# Patient Record
Sex: FEMALE | Race: WHITE | Employment: OTHER
[De-identification: names, ages, dates, MRNs, and addresses within clinical notes are randomized per-mention and may not be internally consistent; named-entity substitution may affect disease eponyms.]

---

## 2017-01-23 DIAGNOSIS — E13.8 OTHER SPECIFIED DIABETES MELLITUS WITH UNSPECIFIED COMPLICATIONS (HCC): ICD-10-CM

## 2017-01-23 DIAGNOSIS — I15.8 OTHER SECONDARY HYPERTENSION: ICD-10-CM

## 2017-01-23 RX ORDER — GLIPIZIDE 5 MG/1
5 TABLET ORAL
Qty: 180 TABLET | Refills: 3 | Status: SHIPPED | OUTPATIENT
Start: 2017-01-23 | End: 2017-08-24 | Stop reason: SDUPTHER

## 2017-01-23 RX ORDER — FUROSEMIDE 40 MG/1
40 TABLET ORAL 2 TIMES DAILY
Qty: 180 TABLET | Refills: 3 | Status: SHIPPED | OUTPATIENT
Start: 2017-01-23 | End: 2018-02-21 | Stop reason: SDUPTHER

## 2017-01-27 DIAGNOSIS — R21 RASH: ICD-10-CM

## 2017-01-27 RX ORDER — CLOBETASOL PROPIONATE 0.5 MG/G
CREAM TOPICAL
Qty: 60 G | Refills: 3 | Status: SHIPPED | OUTPATIENT
Start: 2017-01-27 | End: 2017-05-01 | Stop reason: SDUPTHER

## 2017-03-03 ENCOUNTER — OFFICE VISIT (OUTPATIENT)
Dept: INTERNAL MEDICINE | Facility: CLINIC | Age: 67
End: 2017-03-03

## 2017-03-03 VITALS — BODY MASS INDEX: 36.08 KG/M2 | WEIGHT: 179 LBS | HEIGHT: 59 IN

## 2017-03-03 DIAGNOSIS — N18.30 CHRONIC KIDNEY DISEASE, STAGE 3 (MODERATE): ICD-10-CM

## 2017-03-03 DIAGNOSIS — E78.00 PURE HYPERCHOLESTEROLEMIA: Primary | ICD-10-CM

## 2017-03-03 DIAGNOSIS — E11.8 TYPE 2 DIABETES MELLITUS WITH COMPLICATION, UNSPECIFIED LONG TERM INSULIN USE STATUS: ICD-10-CM

## 2017-03-03 DIAGNOSIS — Z13.9 SCREENING: ICD-10-CM

## 2017-03-03 PROCEDURE — 1090F PRES/ABSN URINE INCON ASSESS: CPT | Performed by: INTERNAL MEDICINE

## 2017-03-03 PROCEDURE — G8427 DOCREV CUR MEDS BY ELIG CLIN: HCPCS | Performed by: INTERNAL MEDICINE

## 2017-03-03 PROCEDURE — 3045F PR MOST RECENT HEMOGLOBIN A1C LEVEL 7.0-9.0%: CPT | Performed by: INTERNAL MEDICINE

## 2017-03-03 PROCEDURE — 3014F SCREEN MAMMO DOC REV: CPT | Performed by: INTERNAL MEDICINE

## 2017-03-03 PROCEDURE — 1123F ACP DISCUSS/DSCN MKR DOCD: CPT | Performed by: INTERNAL MEDICINE

## 2017-03-03 PROCEDURE — 99213 OFFICE O/P EST LOW 20 MIN: CPT | Performed by: INTERNAL MEDICINE

## 2017-03-03 PROCEDURE — G8598 ASA/ANTIPLAT THER USED: HCPCS | Performed by: INTERNAL MEDICINE

## 2017-03-03 PROCEDURE — G8417 CALC BMI ABV UP PARAM F/U: HCPCS | Performed by: INTERNAL MEDICINE

## 2017-03-03 PROCEDURE — 4040F PNEUMOC VAC/ADMIN/RCVD: CPT | Performed by: INTERNAL MEDICINE

## 2017-03-03 PROCEDURE — G8399 PT W/DXA RESULTS DOCUMENT: HCPCS | Performed by: INTERNAL MEDICINE

## 2017-03-03 PROCEDURE — 3017F COLORECTAL CA SCREEN DOC REV: CPT | Performed by: INTERNAL MEDICINE

## 2017-03-03 PROCEDURE — G8484 FLU IMMUNIZE NO ADMIN: HCPCS | Performed by: INTERNAL MEDICINE

## 2017-03-03 PROCEDURE — 1036F TOBACCO NON-USER: CPT | Performed by: INTERNAL MEDICINE

## 2017-03-03 ASSESSMENT — PATIENT HEALTH QUESTIONNAIRE - PHQ9
2. FEELING DOWN, DEPRESSED OR HOPELESS: 0
SUM OF ALL RESPONSES TO PHQ9 QUESTIONS 1 & 2: 0
1. LITTLE INTEREST OR PLEASURE IN DOING THINGS: 0
SUM OF ALL RESPONSES TO PHQ QUESTIONS 1-9: 0

## 2017-03-20 DIAGNOSIS — E13.8 OTHER SPECIFIED DIABETES MELLITUS WITH UNSPECIFIED COMPLICATIONS (HCC): ICD-10-CM

## 2017-03-24 ENCOUNTER — TELEPHONE (OUTPATIENT)
Dept: INTERNAL MEDICINE CLINIC | Age: 67
End: 2017-03-24

## 2017-03-29 ENCOUNTER — HOSPITAL ENCOUNTER (OUTPATIENT)
Age: 67
Setting detail: SPECIMEN
Discharge: HOME | End: 2017-03-29
Payer: MEDICARE

## 2017-03-29 DIAGNOSIS — Z13.9 SCREENING: ICD-10-CM

## 2017-03-29 DIAGNOSIS — E78.00 PURE HYPERCHOLESTEROLEMIA: ICD-10-CM

## 2017-03-29 DIAGNOSIS — N18.30 CHRONIC KIDNEY DISEASE, STAGE 3 (MODERATE): ICD-10-CM

## 2017-03-29 DIAGNOSIS — E11.8 TYPE 2 DIABETES MELLITUS WITH COMPLICATION, UNSPECIFIED LONG TERM INSULIN USE STATUS: ICD-10-CM

## 2017-03-29 LAB
ABSOLUTE EOS #: 0.1 K/UL (ref 0–0.4)
ABSOLUTE LYMPH #: 2 K/UL (ref 1–4.8)
ABSOLUTE MONO #: 0.5 K/UL (ref 0.1–1.2)
ALBUMIN SERPL-MCNC: 4.3 G/DL (ref 3.5–5.2)
ALBUMIN/GLOBULIN RATIO: 1.3 (ref 1–2.5)
ALP BLD-CCNC: 54 U/L (ref 35–104)
ALT SERPL-CCNC: 13 U/L (ref 5–33)
ANION GAP SERPL CALCULATED.3IONS-SCNC: 13 MMOL/L (ref 9–17)
AST SERPL-CCNC: 22 U/L
BASOPHILS # BLD: 0 % (ref 0–2)
BASOPHILS ABSOLUTE: 0 K/UL (ref 0–0.2)
BILIRUB SERPL-MCNC: 0.45 MG/DL (ref 0.3–1.2)
BUN BLDV-MCNC: 56 MG/DL (ref 8–23)
BUN/CREAT BLD: ABNORMAL (ref 9–20)
CALCIUM SERPL-MCNC: 9.7 MG/DL (ref 8.6–10.4)
CHLORIDE BLD-SCNC: 98 MMOL/L (ref 98–107)
CHOLESTEROL/HDL RATIO: 4.2
CHOLESTEROL: 181 MG/DL
CO2: 29 MMOL/L (ref 20–31)
CONTROL: PRESENT
CREAT SERPL-MCNC: 1.1 MG/DL (ref 0.5–0.9)
CREATININE URINE: 44.6 MG/DL (ref 28–217)
DIFFERENTIAL TYPE: NORMAL
EOSINOPHILS RELATIVE PERCENT: 2 % (ref 1–4)
GFR AFRICAN AMERICAN: >60 ML/MIN
GFR NON-AFRICAN AMERICAN: 50 ML/MIN
GFR SERPL CREATININE-BSD FRML MDRD: ABNORMAL ML/MIN/{1.73_M2}
GFR SERPL CREATININE-BSD FRML MDRD: ABNORMAL ML/MIN/{1.73_M2}
GLUCOSE BLD-MCNC: 87 MG/DL (ref 70–99)
HCT VFR BLD CALC: 41.4 % (ref 36–46)
HDLC SERPL-MCNC: 43 MG/DL
HEMOCCULT STL QL: NEGATIVE
HEMOGLOBIN: 13.6 G/DL (ref 12–16)
LDL CHOLESTEROL: 78 MG/DL (ref 0–130)
LYMPHOCYTES # BLD: 26 % (ref 24–44)
MCH RBC QN AUTO: 28.2 PG (ref 26–34)
MCHC RBC AUTO-ENTMCNC: 32.8 G/DL (ref 31–37)
MCV RBC AUTO: 85.8 FL (ref 80–100)
MICROALBUMIN/CREAT 24H UR: 56 MG/L
MICROALBUMIN/CREAT UR-RTO: 126 MCG/MG CREAT
MONOCYTES # BLD: 6 % (ref 2–11)
PDW BLD-RTO: 14.9 % (ref 12.5–15.4)
PLATELET # BLD: 224 K/UL (ref 140–450)
PLATELET ESTIMATE: NORMAL
PMV BLD AUTO: 8.8 FL (ref 6–12)
POTASSIUM SERPL-SCNC: 4 MMOL/L (ref 3.7–5.3)
RBC # BLD: 4.83 M/UL (ref 4–5.2)
RBC # BLD: NORMAL 10*6/UL
SEG NEUTROPHILS: 66 % (ref 36–66)
SEGMENTED NEUTROPHILS ABSOLUTE COUNT: 5.3 K/UL (ref 1.8–7.7)
SODIUM BLD-SCNC: 140 MMOL/L (ref 135–144)
TOTAL PROTEIN: 7.6 G/DL (ref 6.4–8.3)
TRIGL SERPL-MCNC: 300 MG/DL
VLDLC SERPL CALC-MCNC: ABNORMAL MG/DL (ref 1–30)
WBC # BLD: 8 K/UL (ref 3.5–11)
WBC # BLD: NORMAL 10*3/UL

## 2017-03-30 LAB
ESTIMATED AVERAGE GLUCOSE: 140 MG/DL
HBA1C MFR BLD: 6.5 % (ref 4–6)

## 2017-04-07 ENCOUNTER — TELEPHONE (OUTPATIENT)
Dept: INTERNAL MEDICINE CLINIC | Age: 67
End: 2017-04-07

## 2017-04-07 DIAGNOSIS — I10 ESSENTIAL HYPERTENSION: ICD-10-CM

## 2017-04-07 RX ORDER — METOPROLOL TARTRATE 50 MG/1
50 TABLET, FILM COATED ORAL 2 TIMES DAILY
Qty: 60 TABLET | Refills: 0 | Status: SHIPPED | OUTPATIENT
Start: 2017-04-07 | End: 2017-08-24 | Stop reason: SDUPTHER

## 2017-05-01 DIAGNOSIS — R21 RASH: ICD-10-CM

## 2017-05-02 RX ORDER — CLOBETASOL PROPIONATE 0.5 MG/G
CREAM TOPICAL
Qty: 60 G | Refills: 3 | Status: SHIPPED | OUTPATIENT
Start: 2017-05-02 | End: 2017-08-24 | Stop reason: SDUPTHER

## 2017-06-14 DIAGNOSIS — E78.5 HYPERLIPIDEMIA, UNSPECIFIED HYPERLIPIDEMIA TYPE: ICD-10-CM

## 2017-06-14 DIAGNOSIS — E78.00 HYPERCHOLESTEREMIA: ICD-10-CM

## 2017-06-16 RX ORDER — SIMVASTATIN 20 MG
20 TABLET ORAL NIGHTLY
Qty: 90 TABLET | Refills: 3 | Status: SHIPPED | OUTPATIENT
Start: 2017-06-16 | End: 2018-05-30 | Stop reason: SDUPTHER

## 2017-06-16 RX ORDER — FENOFIBRATE 200 MG/1
200 CAPSULE ORAL
Qty: 90 CAPSULE | Refills: 3 | Status: SHIPPED | OUTPATIENT
Start: 2017-06-16 | End: 2018-01-19 | Stop reason: SDUPTHER

## 2017-08-18 ENCOUNTER — TELEPHONE (OUTPATIENT)
Dept: INTERNAL MEDICINE CLINIC | Age: 67
End: 2017-08-18

## 2017-08-24 DIAGNOSIS — E13.8 OTHER SPECIFIED DIABETES MELLITUS WITH UNSPECIFIED COMPLICATIONS (HCC): ICD-10-CM

## 2017-08-24 DIAGNOSIS — Z88.9 MULTIPLE ALLERGIES: ICD-10-CM

## 2017-08-24 DIAGNOSIS — I10 ESSENTIAL HYPERTENSION: ICD-10-CM

## 2017-08-24 DIAGNOSIS — R21 RASH: ICD-10-CM

## 2017-08-24 RX ORDER — GLIPIZIDE 5 MG/1
5 TABLET ORAL
Qty: 180 TABLET | Refills: 3 | Status: SHIPPED | OUTPATIENT
Start: 2017-08-24 | End: 2018-02-21 | Stop reason: SDUPTHER

## 2017-08-24 RX ORDER — LANCETS 28 GAUGE
1 EACH MISCELLANEOUS DAILY
Qty: 300 EACH | Refills: 6 | Status: SHIPPED | OUTPATIENT
Start: 2017-08-24 | End: 2019-11-13 | Stop reason: SDUPTHER

## 2017-08-24 RX ORDER — CLOBETASOL PROPIONATE 0.5 MG/G
CREAM TOPICAL
Qty: 60 G | Refills: 3 | Status: SHIPPED | OUTPATIENT
Start: 2017-08-24 | End: 2017-12-06 | Stop reason: SDUPTHER

## 2017-08-24 RX ORDER — CLOPIDOGREL BISULFATE 75 MG/1
75 TABLET ORAL DAILY
Qty: 90 TABLET | Refills: 3 | Status: SHIPPED | OUTPATIENT
Start: 2017-08-24 | End: 2018-09-06 | Stop reason: SDUPTHER

## 2017-08-24 RX ORDER — DESLORATADINE 5 MG/1
5 TABLET ORAL DAILY
Qty: 90 TABLET | Refills: 3 | Status: SHIPPED | OUTPATIENT
Start: 2017-08-24 | End: 2018-09-07 | Stop reason: SDUPTHER

## 2017-08-24 RX ORDER — METOPROLOL TARTRATE 50 MG/1
50 TABLET, FILM COATED ORAL 2 TIMES DAILY
Qty: 180 TABLET | Refills: 3 | Status: SHIPPED | OUTPATIENT
Start: 2017-08-24 | End: 2017-10-04 | Stop reason: SDUPTHER

## 2017-09-11 ENCOUNTER — OFFICE VISIT (OUTPATIENT)
Dept: INTERNAL MEDICINE CLINIC | Age: 67
End: 2017-09-11
Payer: MEDICARE

## 2017-09-11 VITALS
DIASTOLIC BLOOD PRESSURE: 58 MMHG | BODY MASS INDEX: 34.07 KG/M2 | HEIGHT: 59 IN | SYSTOLIC BLOOD PRESSURE: 138 MMHG | WEIGHT: 169 LBS

## 2017-09-11 DIAGNOSIS — Z12.31 ENCOUNTER FOR SCREENING MAMMOGRAM FOR MALIGNANT NEOPLASM OF BREAST: ICD-10-CM

## 2017-09-11 DIAGNOSIS — N18.30 CHRONIC KIDNEY DISEASE, STAGE 3 (MODERATE): Primary | ICD-10-CM

## 2017-09-11 DIAGNOSIS — I10 ESSENTIAL HYPERTENSION, MALIGNANT: ICD-10-CM

## 2017-09-11 DIAGNOSIS — E78.00 PURE HYPERCHOLESTEROLEMIA: ICD-10-CM

## 2017-09-11 DIAGNOSIS — Z12.39 SCREENING FOR BREAST CANCER: ICD-10-CM

## 2017-09-11 PROCEDURE — 3017F COLORECTAL CA SCREEN DOC REV: CPT | Performed by: INTERNAL MEDICINE

## 2017-09-11 PROCEDURE — 99213 OFFICE O/P EST LOW 20 MIN: CPT | Performed by: INTERNAL MEDICINE

## 2017-09-11 PROCEDURE — 1090F PRES/ABSN URINE INCON ASSESS: CPT | Performed by: INTERNAL MEDICINE

## 2017-09-11 PROCEDURE — G8417 CALC BMI ABV UP PARAM F/U: HCPCS | Performed by: INTERNAL MEDICINE

## 2017-09-11 PROCEDURE — 1036F TOBACCO NON-USER: CPT | Performed by: INTERNAL MEDICINE

## 2017-09-11 PROCEDURE — 1123F ACP DISCUSS/DSCN MKR DOCD: CPT | Performed by: INTERNAL MEDICINE

## 2017-09-11 PROCEDURE — 3014F SCREEN MAMMO DOC REV: CPT | Performed by: INTERNAL MEDICINE

## 2017-09-11 PROCEDURE — G8427 DOCREV CUR MEDS BY ELIG CLIN: HCPCS | Performed by: INTERNAL MEDICINE

## 2017-09-11 PROCEDURE — G8399 PT W/DXA RESULTS DOCUMENT: HCPCS | Performed by: INTERNAL MEDICINE

## 2017-09-11 PROCEDURE — 4040F PNEUMOC VAC/ADMIN/RCVD: CPT | Performed by: INTERNAL MEDICINE

## 2017-09-11 PROCEDURE — G8598 ASA/ANTIPLAT THER USED: HCPCS | Performed by: INTERNAL MEDICINE

## 2017-10-04 DIAGNOSIS — I10 ESSENTIAL HYPERTENSION: ICD-10-CM

## 2017-10-04 NOTE — TELEPHONE ENCOUNTER
Medication: Metoprolol  Last visit: 9/11/17  Next visit: Visit date not found  Last refill: 8/24/2017  Pharmacy:[facility]

## 2017-10-06 RX ORDER — METOPROLOL TARTRATE 50 MG/1
50 TABLET, FILM COATED ORAL 2 TIMES DAILY
Qty: 180 TABLET | Refills: 3 | Status: SHIPPED | OUTPATIENT
Start: 2017-10-06 | End: 2018-02-21 | Stop reason: SDUPTHER

## 2017-10-17 ENCOUNTER — HOSPITAL ENCOUNTER (OUTPATIENT)
Age: 67
Setting detail: SPECIMEN
Discharge: HOME | End: 2017-10-17
Payer: MEDICARE

## 2017-10-17 LAB — URIC ACID: 10.7 MG/DL (ref 2.4–5.7)

## 2017-10-18 LAB — SEDIMENTATION RATE, ERYTHROCYTE: 7 MM (ref 0–20)

## 2017-10-31 ENCOUNTER — OFFICE VISIT (OUTPATIENT)
Dept: INTERNAL MEDICINE CLINIC | Age: 67
End: 2017-10-31
Payer: MEDICARE

## 2017-10-31 VITALS
SYSTOLIC BLOOD PRESSURE: 130 MMHG | DIASTOLIC BLOOD PRESSURE: 84 MMHG | BODY MASS INDEX: 34.09 KG/M2 | WEIGHT: 169.09 LBS | HEIGHT: 59 IN

## 2017-10-31 DIAGNOSIS — Z12.39 BREAST CANCER SCREENING: Primary | ICD-10-CM

## 2017-10-31 DIAGNOSIS — M10.9 ACUTE GOUT INVOLVING TOE OF LEFT FOOT, UNSPECIFIED CAUSE: ICD-10-CM

## 2017-10-31 PROCEDURE — 3014F SCREEN MAMMO DOC REV: CPT | Performed by: INTERNAL MEDICINE

## 2017-10-31 PROCEDURE — 4040F PNEUMOC VAC/ADMIN/RCVD: CPT | Performed by: INTERNAL MEDICINE

## 2017-10-31 PROCEDURE — G8417 CALC BMI ABV UP PARAM F/U: HCPCS | Performed by: INTERNAL MEDICINE

## 2017-10-31 PROCEDURE — 99213 OFFICE O/P EST LOW 20 MIN: CPT | Performed by: INTERNAL MEDICINE

## 2017-10-31 PROCEDURE — 1090F PRES/ABSN URINE INCON ASSESS: CPT | Performed by: INTERNAL MEDICINE

## 2017-10-31 PROCEDURE — 1123F ACP DISCUSS/DSCN MKR DOCD: CPT | Performed by: INTERNAL MEDICINE

## 2017-10-31 PROCEDURE — G8399 PT W/DXA RESULTS DOCUMENT: HCPCS | Performed by: INTERNAL MEDICINE

## 2017-10-31 PROCEDURE — 3017F COLORECTAL CA SCREEN DOC REV: CPT | Performed by: INTERNAL MEDICINE

## 2017-10-31 PROCEDURE — 1036F TOBACCO NON-USER: CPT | Performed by: INTERNAL MEDICINE

## 2017-10-31 PROCEDURE — G8484 FLU IMMUNIZE NO ADMIN: HCPCS | Performed by: INTERNAL MEDICINE

## 2017-10-31 PROCEDURE — G8598 ASA/ANTIPLAT THER USED: HCPCS | Performed by: INTERNAL MEDICINE

## 2017-10-31 PROCEDURE — G8427 DOCREV CUR MEDS BY ELIG CLIN: HCPCS | Performed by: INTERNAL MEDICINE

## 2017-10-31 RX ORDER — ALLOPURINOL 100 MG/1
100 TABLET ORAL DAILY
Qty: 30 TABLET | Refills: 3 | Status: SHIPPED | OUTPATIENT
Start: 2017-10-31 | End: 2017-10-31 | Stop reason: SDUPTHER

## 2017-10-31 RX ORDER — PREDNISONE 20 MG/1
20 TABLET ORAL DAILY
Qty: 7 TABLET | Refills: 0 | Status: SHIPPED | OUTPATIENT
Start: 2017-10-31 | End: 2017-10-31 | Stop reason: SDUPTHER

## 2017-10-31 RX ORDER — TRAMADOL HYDROCHLORIDE 50 MG/1
50 TABLET ORAL EVERY 6 HOURS PRN
Qty: 15 TABLET | Refills: 0 | Status: SHIPPED | OUTPATIENT
Start: 2017-10-31 | End: 2017-11-05

## 2017-10-31 NOTE — PROGRESS NOTES
Acute gout involving toe of left foot, unspecified cause  traMADol (ULTRAM) 50 MG tablet     allopurinol (ZYLOPRIM) 100 MG tablet    predniSONE (DELTASONE) 20 MG tablet           Plan:

## 2017-11-01 RX ORDER — PREDNISONE 20 MG/1
20 TABLET ORAL DAILY
Qty: 7 TABLET | Refills: 0 | Status: SHIPPED | OUTPATIENT
Start: 2017-11-01 | End: 2018-12-19 | Stop reason: SDUPTHER

## 2017-11-01 RX ORDER — ALLOPURINOL 100 MG/1
100 TABLET ORAL DAILY
Qty: 14 TABLET | Refills: 0 | Status: SHIPPED | OUTPATIENT
Start: 2017-11-01 | End: 2018-12-19 | Stop reason: SDUPTHER

## 2017-12-05 NOTE — TELEPHONE ENCOUNTER
Medication: Metformin  Last visit: 10/31/17  Next visit: 1/8/2018  Last refill: 3/20/17  Pharmacy: [facility]

## 2017-12-06 DIAGNOSIS — R21 RASH: ICD-10-CM

## 2017-12-06 NOTE — TELEPHONE ENCOUNTER
Medication: cream  Last visit: 10/31/17  Next visit: 1/8/2018  Last refill: 08/24/17  [facility]: [facility]

## 2017-12-07 RX ORDER — CLOBETASOL PROPIONATE 0.5 MG/G
CREAM TOPICAL
Qty: 60 G | Refills: 3 | Status: SHIPPED | OUTPATIENT
Start: 2017-12-07 | End: 2019-12-18 | Stop reason: SDUPTHER

## 2017-12-22 ENCOUNTER — TELEPHONE (OUTPATIENT)
Dept: INTERNAL MEDICINE CLINIC | Age: 67
End: 2017-12-22

## 2018-01-19 DIAGNOSIS — E78.00 HYPERCHOLESTEREMIA: ICD-10-CM

## 2018-01-19 RX ORDER — FENOFIBRATE 200 MG/1
200 CAPSULE ORAL
Qty: 30 CAPSULE | Refills: 0 | Status: SHIPPED | OUTPATIENT
Start: 2018-01-19 | End: 2018-02-21 | Stop reason: SDUPTHER

## 2018-02-21 DIAGNOSIS — E78.00 HYPERCHOLESTEREMIA: ICD-10-CM

## 2018-02-21 DIAGNOSIS — I10 ESSENTIAL HYPERTENSION: ICD-10-CM

## 2018-02-21 DIAGNOSIS — I15.8 OTHER SECONDARY HYPERTENSION: ICD-10-CM

## 2018-02-21 NOTE — TELEPHONE ENCOUNTER
Medication: furosemide, metoprolol, metformin, glipizide, fenofibrate, test strips  Last visit: [id number]  Next visit: Visit date not found  Last refill: varying, but patient states aSmy Gibson is requesting new scripts  Pharmacy: [facility]

## 2018-02-22 RX ORDER — METOPROLOL TARTRATE 50 MG/1
50 TABLET, FILM COATED ORAL 2 TIMES DAILY
Qty: 180 TABLET | Refills: 3 | Status: SHIPPED | OUTPATIENT
Start: 2018-02-22 | End: 2018-09-07 | Stop reason: SDUPTHER

## 2018-02-22 RX ORDER — FENOFIBRATE 200 MG/1
200 CAPSULE ORAL
Qty: 90 CAPSULE | Refills: 3 | Status: SHIPPED | OUTPATIENT
Start: 2018-02-22 | End: 2018-12-19 | Stop reason: SDUPTHER

## 2018-02-22 RX ORDER — FUROSEMIDE 40 MG/1
40 TABLET ORAL 2 TIMES DAILY
Qty: 180 TABLET | Refills: 3 | Status: SHIPPED | OUTPATIENT
Start: 2018-02-22 | End: 2019-06-18 | Stop reason: SDUPTHER

## 2018-02-22 RX ORDER — GLIPIZIDE 5 MG/1
5 TABLET ORAL
Qty: 180 TABLET | Refills: 3 | Status: SHIPPED | OUTPATIENT
Start: 2018-02-22 | End: 2019-01-25 | Stop reason: SDUPTHER

## 2018-05-30 DIAGNOSIS — E78.5 HYPERLIPIDEMIA, UNSPECIFIED HYPERLIPIDEMIA TYPE: ICD-10-CM

## 2018-05-30 RX ORDER — SIMVASTATIN 20 MG
20 TABLET ORAL NIGHTLY
Qty: 90 TABLET | Refills: 3 | Status: SHIPPED | OUTPATIENT
Start: 2018-05-30 | End: 2019-06-18

## 2018-09-06 RX ORDER — CLOPIDOGREL BISULFATE 75 MG/1
75 TABLET ORAL DAILY
Qty: 30 TABLET | Refills: 3 | Status: SHIPPED | OUTPATIENT
Start: 2018-09-06 | End: 2018-09-06 | Stop reason: SDUPTHER

## 2018-09-06 NOTE — TELEPHONE ENCOUNTER
Patient requesting a 30 day supply sent to [address] [place] [address], and a 90 day supply to meds by mail.  Please advise--

## 2018-09-07 DIAGNOSIS — Z88.9 MULTIPLE ALLERGIES: ICD-10-CM

## 2018-09-07 DIAGNOSIS — I10 ESSENTIAL HYPERTENSION: ICD-10-CM

## 2018-09-07 RX ORDER — CLOPIDOGREL BISULFATE 75 MG/1
75 TABLET ORAL DAILY
Qty: 90 TABLET | Refills: 3 | Status: SHIPPED | OUTPATIENT
Start: 2018-09-07 | End: 2019-06-18 | Stop reason: SDUPTHER

## 2018-09-10 RX ORDER — METOPROLOL TARTRATE 50 MG/1
50 TABLET, FILM COATED ORAL 2 TIMES DAILY
Qty: 180 TABLET | Refills: 3 | Status: SHIPPED | OUTPATIENT
Start: 2018-09-10 | End: 2019-02-08 | Stop reason: SDUPTHER

## 2018-09-10 RX ORDER — DESLORATADINE 5 MG/1
5 TABLET ORAL DAILY
Qty: 90 TABLET | Refills: 3 | Status: SHIPPED | OUTPATIENT
Start: 2018-09-10 | End: 2019-02-08 | Stop reason: SDUPTHER

## 2018-10-01 ENCOUNTER — OFFICE VISIT (OUTPATIENT)
Dept: INTERNAL MEDICINE CLINIC | Age: 68
End: 2018-10-01
Payer: MEDICARE

## 2018-10-01 VITALS
HEIGHT: 59 IN | SYSTOLIC BLOOD PRESSURE: 124 MMHG | DIASTOLIC BLOOD PRESSURE: 62 MMHG | WEIGHT: 169 LBS | BODY MASS INDEX: 34.07 KG/M2

## 2018-10-01 DIAGNOSIS — Z13.220 SCREENING FOR HYPERLIPIDEMIA: ICD-10-CM

## 2018-10-01 DIAGNOSIS — Z00.00 ROUTINE GENERAL MEDICAL EXAMINATION AT A HEALTH CARE FACILITY: ICD-10-CM

## 2018-10-01 DIAGNOSIS — Z12.11 SCREENING FOR COLON CANCER: ICD-10-CM

## 2018-10-01 DIAGNOSIS — E11.8 TYPE 2 DIABETES MELLITUS WITH COMPLICATION, UNSPECIFIED WHETHER LONG TERM INSULIN USE: Primary | ICD-10-CM

## 2018-10-01 DIAGNOSIS — Z12.31 ENCOUNTER FOR SCREENING MAMMOGRAM FOR BREAST CANCER: ICD-10-CM

## 2018-10-01 PROCEDURE — G8484 FLU IMMUNIZE NO ADMIN: HCPCS | Performed by: INTERNAL MEDICINE

## 2018-10-01 PROCEDURE — G8598 ASA/ANTIPLAT THER USED: HCPCS | Performed by: INTERNAL MEDICINE

## 2018-10-01 PROCEDURE — 4040F PNEUMOC VAC/ADMIN/RCVD: CPT | Performed by: INTERNAL MEDICINE

## 2018-10-01 PROCEDURE — 3046F HEMOGLOBIN A1C LEVEL >9.0%: CPT | Performed by: INTERNAL MEDICINE

## 2018-10-01 PROCEDURE — G0438 PPPS, INITIAL VISIT: HCPCS | Performed by: INTERNAL MEDICINE

## 2018-10-01 ASSESSMENT — PATIENT HEALTH QUESTIONNAIRE - PHQ9
SUM OF ALL RESPONSES TO PHQ QUESTIONS 1-9: 0
SUM OF ALL RESPONSES TO PHQ QUESTIONS 1-9: 0

## 2018-10-01 ASSESSMENT — LIFESTYLE VARIABLES: HOW OFTEN DO YOU HAVE A DRINK CONTAINING ALCOHOL: 0

## 2018-10-01 ASSESSMENT — ANXIETY QUESTIONNAIRES: GAD7 TOTAL SCORE: 0

## 2018-10-19 ENCOUNTER — TELEPHONE (OUTPATIENT)
Dept: INTERNAL MEDICINE CLINIC | Age: 68
End: 2018-10-19

## 2018-11-21 ENCOUNTER — TELEPHONE (OUTPATIENT)
Dept: INTERNAL MEDICINE CLINIC | Age: 68
End: 2018-11-21

## 2018-12-12 ENCOUNTER — TELEPHONE (OUTPATIENT)
Dept: INTERNAL MEDICINE CLINIC | Age: 68
End: 2018-12-12

## 2018-12-19 DIAGNOSIS — M10.9 ACUTE GOUT INVOLVING TOE OF LEFT FOOT, UNSPECIFIED CAUSE: ICD-10-CM

## 2018-12-19 DIAGNOSIS — E78.00 HYPERCHOLESTEREMIA: ICD-10-CM

## 2018-12-19 NOTE — TELEPHONE ENCOUNTER
Patient requesting medication refills be sent to [facility]. Please contact her if any issues. Thank you.

## 2018-12-21 RX ORDER — FENOFIBRATE 200 MG/1
200 CAPSULE ORAL
Qty: 90 CAPSULE | Refills: 3 | Status: SHIPPED | OUTPATIENT
Start: 2018-12-21 | End: 2019-12-18 | Stop reason: SDUPTHER

## 2018-12-21 RX ORDER — ALLOPURINOL 100 MG/1
100 TABLET ORAL DAILY
Qty: 14 TABLET | Refills: 0 | Status: SHIPPED | OUTPATIENT
Start: 2018-12-21 | End: 2020-02-20 | Stop reason: SDUPTHER

## 2018-12-21 RX ORDER — PREDNISONE 20 MG/1
20 TABLET ORAL DAILY
Qty: 7 TABLET | Refills: 0 | Status: SHIPPED | OUTPATIENT
Start: 2018-12-21 | End: 2018-12-28

## 2018-12-31 ENCOUNTER — TELEPHONE (OUTPATIENT)
Dept: INTERNAL MEDICINE CLINIC | Age: 68
End: 2018-12-31

## 2019-01-25 RX ORDER — GLIPIZIDE 5 MG/1
5 TABLET ORAL
Qty: 180 TABLET | Refills: 3 | Status: SHIPPED | OUTPATIENT
Start: 2019-01-25 | End: 2019-12-18 | Stop reason: ALTCHOICE

## 2019-02-08 DIAGNOSIS — Z88.9 MULTIPLE ALLERGIES: ICD-10-CM

## 2019-02-08 DIAGNOSIS — I10 ESSENTIAL HYPERTENSION: ICD-10-CM

## 2019-02-11 RX ORDER — METOPROLOL TARTRATE 50 MG/1
50 TABLET, FILM COATED ORAL 2 TIMES DAILY
Qty: 180 TABLET | Refills: 3 | Status: SHIPPED | OUTPATIENT
Start: 2019-02-11 | End: 2020-03-03 | Stop reason: SDUPTHER

## 2019-02-11 RX ORDER — DESLORATADINE 5 MG/1
5 TABLET ORAL DAILY
Qty: 90 TABLET | Refills: 3 | Status: SHIPPED | OUTPATIENT
Start: 2019-02-11 | End: 2019-07-24 | Stop reason: SDUPTHER

## 2019-02-13 ENCOUNTER — OFFICE VISIT (OUTPATIENT)
Dept: INTERNAL MEDICINE CLINIC | Age: 69
End: 2019-02-13
Payer: MEDICARE

## 2019-02-13 VITALS
WEIGHT: 154 LBS | HEIGHT: 59 IN | SYSTOLIC BLOOD PRESSURE: 122 MMHG | BODY MASS INDEX: 31.04 KG/M2 | DIASTOLIC BLOOD PRESSURE: 74 MMHG

## 2019-02-13 DIAGNOSIS — M10.9 ACUTE GOUT INVOLVING TOE OF LEFT FOOT, UNSPECIFIED CAUSE: ICD-10-CM

## 2019-02-13 DIAGNOSIS — E11.8 TYPE 2 DIABETES MELLITUS WITH COMPLICATION, UNSPECIFIED WHETHER LONG TERM INSULIN USE: Primary | ICD-10-CM

## 2019-02-13 LAB — HBA1C MFR BLD: 6.6 %

## 2019-02-13 PROCEDURE — 1036F TOBACCO NON-USER: CPT | Performed by: INTERNAL MEDICINE

## 2019-02-13 PROCEDURE — G8400 PT W/DXA NO RESULTS DOC: HCPCS | Performed by: INTERNAL MEDICINE

## 2019-02-13 PROCEDURE — 99213 OFFICE O/P EST LOW 20 MIN: CPT | Performed by: INTERNAL MEDICINE

## 2019-02-13 PROCEDURE — G8598 ASA/ANTIPLAT THER USED: HCPCS | Performed by: INTERNAL MEDICINE

## 2019-02-13 PROCEDURE — G8417 CALC BMI ABV UP PARAM F/U: HCPCS | Performed by: INTERNAL MEDICINE

## 2019-02-13 PROCEDURE — G8427 DOCREV CUR MEDS BY ELIG CLIN: HCPCS | Performed by: INTERNAL MEDICINE

## 2019-02-13 PROCEDURE — 3017F COLORECTAL CA SCREEN DOC REV: CPT | Performed by: INTERNAL MEDICINE

## 2019-02-13 PROCEDURE — G8484 FLU IMMUNIZE NO ADMIN: HCPCS | Performed by: INTERNAL MEDICINE

## 2019-02-13 PROCEDURE — 2022F DILAT RTA XM EVC RTNOPTHY: CPT | Performed by: INTERNAL MEDICINE

## 2019-02-13 PROCEDURE — 1101F PT FALLS ASSESS-DOCD LE1/YR: CPT | Performed by: INTERNAL MEDICINE

## 2019-02-13 PROCEDURE — 83036 HEMOGLOBIN GLYCOSYLATED A1C: CPT | Performed by: INTERNAL MEDICINE

## 2019-02-13 PROCEDURE — 4040F PNEUMOC VAC/ADMIN/RCVD: CPT | Performed by: INTERNAL MEDICINE

## 2019-02-13 PROCEDURE — 1090F PRES/ABSN URINE INCON ASSESS: CPT | Performed by: INTERNAL MEDICINE

## 2019-02-13 PROCEDURE — 3044F HG A1C LEVEL LT 7.0%: CPT | Performed by: INTERNAL MEDICINE

## 2019-02-13 PROCEDURE — 1123F ACP DISCUSS/DSCN MKR DOCD: CPT | Performed by: INTERNAL MEDICINE

## 2019-02-13 RX ORDER — DOXYCYCLINE HYCLATE 100 MG
100 TABLET ORAL 2 TIMES DAILY
Qty: 14 TABLET | Refills: 0 | Status: SHIPPED | OUTPATIENT
Start: 2019-02-13 | End: 2019-02-27 | Stop reason: SDUPTHER

## 2019-02-13 ASSESSMENT — PATIENT HEALTH QUESTIONNAIRE - PHQ9
2. FEELING DOWN, DEPRESSED OR HOPELESS: 0
SUM OF ALL RESPONSES TO PHQ9 QUESTIONS 1 & 2: 0
SUM OF ALL RESPONSES TO PHQ QUESTIONS 1-9: 0
1. LITTLE INTEREST OR PLEASURE IN DOING THINGS: 0
SUM OF ALL RESPONSES TO PHQ QUESTIONS 1-9: 0

## 2019-02-27 ENCOUNTER — HOSPITAL ENCOUNTER (OUTPATIENT)
Age: 69
Setting detail: SPECIMEN
Discharge: HOME | End: 2019-02-27
Payer: MEDICARE

## 2019-02-27 DIAGNOSIS — E11.8 TYPE 2 DIABETES MELLITUS WITH COMPLICATION, UNSPECIFIED WHETHER LONG TERM INSULIN USE: ICD-10-CM

## 2019-02-27 DIAGNOSIS — Z13.220 SCREENING FOR HYPERLIPIDEMIA: ICD-10-CM

## 2019-02-27 LAB
CHOLESTEROL/HDL RATIO: 3.3
CHOLESTEROL: 159 MG/DL
CREATININE URINE: 36.2 MG/DL (ref 28–217)
ESTIMATED AVERAGE GLUCOSE: 120 MG/DL
HBA1C MFR BLD: 5.8 % (ref 4–6)
HDLC SERPL-MCNC: 48 MG/DL
LDL CHOLESTEROL: 68 MG/DL (ref 0–130)
MICROALBUMIN/CREAT 24H UR: 56 MG/L
MICROALBUMIN/CREAT UR-RTO: 155 MCG/MG CREAT
TRIGL SERPL-MCNC: 214 MG/DL
VLDLC SERPL CALC-MCNC: ABNORMAL MG/DL (ref 1–30)

## 2019-02-28 ENCOUNTER — TELEPHONE (OUTPATIENT)
Dept: INTERNAL MEDICINE CLINIC | Age: 69
End: 2019-02-28

## 2019-02-28 RX ORDER — DOXYCYCLINE HYCLATE 100 MG
100 TABLET ORAL 2 TIMES DAILY
Qty: 14 TABLET | Refills: 0 | Status: SHIPPED | OUTPATIENT
Start: 2019-02-28 | End: 2019-03-07

## 2019-06-18 DIAGNOSIS — I15.8 OTHER SECONDARY HYPERTENSION: ICD-10-CM

## 2019-06-18 DIAGNOSIS — E78.5 HYPERLIPIDEMIA, UNSPECIFIED HYPERLIPIDEMIA TYPE: ICD-10-CM

## 2019-06-18 RX ORDER — CLOPIDOGREL BISULFATE 75 MG/1
75 TABLET ORAL DAILY
Qty: 90 TABLET | Refills: 3 | Status: SHIPPED | OUTPATIENT
Start: 2019-06-18 | End: 2020-08-27 | Stop reason: SDUPTHER

## 2019-06-18 RX ORDER — FUROSEMIDE 40 MG/1
40 TABLET ORAL 2 TIMES DAILY
Qty: 30 TABLET | Refills: 0 | Status: SHIPPED | OUTPATIENT
Start: 2019-06-18 | End: 2019-12-18 | Stop reason: SDUPTHER

## 2019-06-18 RX ORDER — SIMVASTATIN 20 MG
20 TABLET ORAL NIGHTLY
Qty: 90 TABLET | Refills: 3 | Status: SHIPPED | OUTPATIENT
Start: 2019-06-18 | End: 2020-02-20 | Stop reason: ALTCHOICE

## 2019-06-18 RX ORDER — FUROSEMIDE 40 MG/1
40 TABLET ORAL 2 TIMES DAILY
Qty: 180 TABLET | Refills: 3 | Status: SHIPPED | OUTPATIENT
Start: 2019-06-18 | End: 2019-06-18 | Stop reason: SDUPTHER

## 2019-06-18 NOTE — TELEPHONE ENCOUNTER
Spenser is to be sent to [facility] on [address], please.     Last visit 2/13/19

## 2019-07-23 DIAGNOSIS — Z88.9 MULTIPLE ALLERGIES: ICD-10-CM

## 2019-07-23 NOTE — TELEPHONE ENCOUNTER
Patient is asking for refill to be sent to local pharmacy as the mail service is on back order for 6 weeks. Patient is asking for enough to get her through. Please call patient with questions if needed. Thank You.

## 2019-07-24 RX ORDER — DESLORATADINE 5 MG/1
5 TABLET ORAL DAILY
Qty: 90 TABLET | Refills: 3 | Status: SHIPPED | OUTPATIENT
Start: 2019-07-24 | End: 2019-11-13 | Stop reason: SDUPTHER

## 2019-08-19 ENCOUNTER — TELEPHONE (OUTPATIENT)
Dept: INTERNAL MEDICINE CLINIC | Age: 69
End: 2019-08-19

## 2019-08-19 DIAGNOSIS — E11.8 TYPE 2 DIABETES MELLITUS WITH COMPLICATION, UNSPECIFIED WHETHER LONG TERM INSULIN USE: Primary | ICD-10-CM

## 2019-11-13 DIAGNOSIS — Z88.9 MULTIPLE ALLERGIES: ICD-10-CM

## 2019-11-13 RX ORDER — DESLORATADINE 5 MG/1
5 TABLET ORAL DAILY
Qty: 30 TABLET | Refills: 0 | Status: SHIPPED | OUTPATIENT
Start: 2019-11-13 | End: 2020-06-29 | Stop reason: SDUPTHER

## 2019-11-13 RX ORDER — LANCETS 28 GAUGE
1 EACH MISCELLANEOUS DAILY
Qty: 30 EACH | Refills: 0 | Status: SHIPPED | OUTPATIENT
Start: 2019-11-13 | End: 2019-12-18 | Stop reason: SDUPTHER

## 2019-12-06 RX ORDER — LANCETS 28 GAUGE
EACH MISCELLANEOUS
Qty: 100 EACH | Refills: 0 | OUTPATIENT
Start: 2019-12-06

## 2019-12-11 ENCOUNTER — TELEPHONE (OUTPATIENT)
Dept: INTERNAL MEDICINE CLINIC | Age: 69
End: 2019-12-11

## 2019-12-18 ENCOUNTER — TELEPHONE (OUTPATIENT)
Dept: INTERNAL MEDICINE CLINIC | Age: 69
End: 2019-12-18

## 2019-12-18 ENCOUNTER — HOSPITAL ENCOUNTER (OUTPATIENT)
Age: 69
Setting detail: SPECIMEN
Discharge: HOME | End: 2019-12-18
Payer: MEDICARE

## 2019-12-18 ENCOUNTER — OFFICE VISIT (OUTPATIENT)
Dept: INTERNAL MEDICINE CLINIC | Age: 69
End: 2019-12-18
Payer: MEDICARE

## 2019-12-18 VITALS
SYSTOLIC BLOOD PRESSURE: 134 MMHG | OXYGEN SATURATION: 97 % | WEIGHT: 141 LBS | DIASTOLIC BLOOD PRESSURE: 76 MMHG | BODY MASS INDEX: 28.43 KG/M2 | HEIGHT: 59 IN | HEART RATE: 70 BPM

## 2019-12-18 DIAGNOSIS — Z12.31 ENCOUNTER FOR SCREENING MAMMOGRAM FOR BREAST CANCER: ICD-10-CM

## 2019-12-18 DIAGNOSIS — I15.8 OTHER SECONDARY HYPERTENSION: ICD-10-CM

## 2019-12-18 DIAGNOSIS — Z76.89 ENCOUNTER TO ESTABLISH CARE: ICD-10-CM

## 2019-12-18 DIAGNOSIS — E11.8 TYPE 2 DIABETES MELLITUS WITH COMPLICATION (HCC): Primary | ICD-10-CM

## 2019-12-18 DIAGNOSIS — I10 ESSENTIAL HYPERTENSION, MALIGNANT: Primary | ICD-10-CM

## 2019-12-18 DIAGNOSIS — L30.9 HAND DERMATITIS: ICD-10-CM

## 2019-12-18 DIAGNOSIS — N18.30 STAGE 3 CHRONIC KIDNEY DISEASE (HCC): ICD-10-CM

## 2019-12-18 DIAGNOSIS — E78.00 HYPERCHOLESTEREMIA: ICD-10-CM

## 2019-12-18 DIAGNOSIS — E11.8 TYPE 2 DIABETES MELLITUS WITH COMPLICATION (HCC): ICD-10-CM

## 2019-12-18 DIAGNOSIS — Z12.11 COLON CANCER SCREENING: ICD-10-CM

## 2019-12-18 LAB
ANION GAP SERPL CALCULATED.3IONS-SCNC: 14 MMOL/L (ref 9–17)
BUN BLDV-MCNC: 49 MG/DL (ref 8–23)
BUN/CREAT BLD: ABNORMAL (ref 9–20)
CALCIUM SERPL-MCNC: 9.9 MG/DL (ref 8.6–10.4)
CHLORIDE BLD-SCNC: 105 MMOL/L (ref 98–107)
CO2: 26 MMOL/L (ref 20–31)
CREAT SERPL-MCNC: 1.07 MG/DL (ref 0.5–0.9)
GFR AFRICAN AMERICAN: >60 ML/MIN
GFR NON-AFRICAN AMERICAN: 51 ML/MIN
GFR SERPL CREATININE-BSD FRML MDRD: ABNORMAL ML/MIN/{1.73_M2}
GFR SERPL CREATININE-BSD FRML MDRD: ABNORMAL ML/MIN/{1.73_M2}
GLUCOSE BLD-MCNC: 77 MG/DL (ref 70–99)
HBA1C MFR BLD: 5.8 %
POTASSIUM SERPL-SCNC: 3.7 MMOL/L (ref 3.7–5.3)
SODIUM BLD-SCNC: 145 MMOL/L (ref 135–144)

## 2019-12-18 PROCEDURE — 99214 OFFICE O/P EST MOD 30 MIN: CPT | Performed by: INTERNAL MEDICINE

## 2019-12-18 PROCEDURE — 83036 HEMOGLOBIN GLYCOSYLATED A1C: CPT | Performed by: INTERNAL MEDICINE

## 2019-12-18 RX ORDER — LANCETS 28 GAUGE
1 EACH MISCELLANEOUS DAILY
Qty: 90 EACH | Refills: 3 | Status: SHIPPED | OUTPATIENT
Start: 2019-12-18 | End: 2021-03-18 | Stop reason: SDUPTHER

## 2019-12-18 RX ORDER — GLIPIZIDE 5 MG/1
5 TABLET ORAL DAILY
Qty: 180 TABLET | Refills: 0
Start: 2019-12-18 | End: 2020-02-20 | Stop reason: HOSPADM

## 2019-12-18 RX ORDER — FENOFIBRATE 200 MG/1
200 CAPSULE ORAL
Qty: 90 CAPSULE | Refills: 3 | Status: SHIPPED | OUTPATIENT
Start: 2019-12-18 | End: 2020-02-20 | Stop reason: HOSPADM

## 2019-12-18 RX ORDER — FUROSEMIDE 40 MG/1
40 TABLET ORAL 2 TIMES DAILY
Qty: 180 TABLET | Refills: 0 | Status: SHIPPED | OUTPATIENT
Start: 2019-12-18 | End: 2020-06-08

## 2019-12-18 RX ORDER — CLOBETASOL PROPIONATE 0.5 MG/G
OINTMENT TOPICAL
Qty: 45 G | Refills: 0 | Status: ON HOLD | OUTPATIENT
Start: 2019-12-18 | End: 2021-02-18

## 2019-12-18 RX ORDER — BLOOD PRESSURE TEST KIT
1 KIT MISCELLANEOUS DAILY
Qty: 1 KIT | Refills: 0 | Status: SHIPPED | OUTPATIENT
Start: 2019-12-18 | End: 2020-09-09

## 2019-12-19 RX ORDER — BLOOD PRESSURE TEST KIT
1 KIT MISCELLANEOUS 3 TIMES DAILY
Qty: 1 KIT | Refills: 0 | Status: SHIPPED | OUTPATIENT
Start: 2019-12-19

## 2020-01-03 ENCOUNTER — TELEPHONE (OUTPATIENT)
Dept: INTERNAL MEDICINE CLINIC | Age: 70
End: 2020-01-03

## 2020-01-07 ENCOUNTER — HOSPITAL ENCOUNTER (EMERGENCY)
Age: 70
Discharge: HOME | End: 2020-01-07
Attending: EMERGENCY MEDICINE
Payer: MEDICARE

## 2020-01-07 ENCOUNTER — APPOINTMENT (OUTPATIENT)
Dept: GENERAL RADIOLOGY | Age: 70
End: 2020-01-07
Payer: MEDICARE

## 2020-01-07 VITALS
WEIGHT: 141 LBS | RESPIRATION RATE: 18 BRPM | HEIGHT: 59 IN | BODY MASS INDEX: 28.43 KG/M2 | HEART RATE: 100 BPM | SYSTOLIC BLOOD PRESSURE: 209 MMHG | DIASTOLIC BLOOD PRESSURE: 92 MMHG | TEMPERATURE: 97.4 F | OXYGEN SATURATION: 94 %

## 2020-01-07 PROCEDURE — 94761 N-INVAS EAR/PLS OXIMETRY MLT: CPT

## 2020-01-07 PROCEDURE — 71046 X-RAY EXAM CHEST 2 VIEWS: CPT

## 2020-01-07 PROCEDURE — 6370000000 HC RX 637 (ALT 250 FOR IP): Performed by: EMERGENCY MEDICINE

## 2020-01-07 PROCEDURE — 94640 AIRWAY INHALATION TREATMENT: CPT

## 2020-01-07 PROCEDURE — 99285 EMERGENCY DEPT VISIT HI MDM: CPT

## 2020-01-07 RX ORDER — FUROSEMIDE 20 MG/1
20 TABLET ORAL DAILY
Status: DISCONTINUED | OUTPATIENT
Start: 2020-01-07 | End: 2020-01-07

## 2020-01-07 RX ORDER — ALBUTEROL SULFATE 90 UG/1
1-2 AEROSOL, METERED RESPIRATORY (INHALATION) EVERY 4 HOURS PRN
Qty: 1 INHALER | Refills: 0 | Status: ON HOLD | OUTPATIENT
Start: 2020-01-07 | End: 2021-02-18 | Stop reason: ALTCHOICE

## 2020-01-07 RX ORDER — AZITHROMYCIN 250 MG/1
TABLET, FILM COATED ORAL
Qty: 1 PACKET | Refills: 0 | Status: SHIPPED | OUTPATIENT
Start: 2020-01-07 | End: 2020-01-11

## 2020-01-07 RX ORDER — AZITHROMYCIN 250 MG/1
500 TABLET, FILM COATED ORAL ONCE
Status: COMPLETED | OUTPATIENT
Start: 2020-01-07 | End: 2020-01-07

## 2020-01-07 RX ORDER — IPRATROPIUM BROMIDE AND ALBUTEROL SULFATE 2.5; .5 MG/3ML; MG/3ML
1 SOLUTION RESPIRATORY (INHALATION) ONCE
Status: COMPLETED | OUTPATIENT
Start: 2020-01-07 | End: 2020-01-07

## 2020-01-07 RX ADMIN — IPRATROPIUM BROMIDE AND ALBUTEROL SULFATE 1 AMPULE: .5; 3 SOLUTION RESPIRATORY (INHALATION) at 18:50

## 2020-01-07 RX ADMIN — AZITHROMYCIN 500 MG: 250 TABLET, FILM COATED ORAL at 21:05

## 2020-01-07 ASSESSMENT — ENCOUNTER SYMPTOMS
ABDOMINAL PAIN: 0
BACK PAIN: 0
SHORTNESS OF BREATH: 1
SORE THROAT: 1
WHEEZING: 1
RHINORRHEA: 1
NAUSEA: 0
VOMITING: 0
COUGH: 1

## 2020-01-07 NOTE — ED NOTES
Pt arrives to ED c/o cough and congestion. Patient states that since last week she has had a runny nose and a cough. Patient reports a history of COPD and CHF. Patient does not wear O2 at home. Patient states that the cough has been dry. Patient reports taking her medications as prescribed. Patient denies any swelling to her legs. Patient is resting on stretcher at this time with no s/s of distress noted.             Chantel Joya RN  01/07/20 6018

## 2020-01-07 NOTE — DISCHARGE INSTR - COC
BM:[id number]}  Toileting  {CHP DME RRIM:483597268}  Feeding  {CHP DME KYPC:084259820}  Med Admin  {P DME ULGP:952040412}  Med Delivery   { WILL MED Delivery:511333625}    Wound Care Documentation and Therapy:        Elimination:  Continence:   · Bowel: {YES / OB:47343}  · Bladder: {YES / GQ:89107}  Urinary Catheter: {Urinary Catheter:363180995}   Colostomy/Ileostomy/Ileal Conduit: {YES / ZJ:44963}       Date of Last BM: ***  No intake or output data in the 24 hours ending 20 1745  No intake/output data recorded.     Safety Concerns:     [address] Safety Concerns:095655672}    Impairments/Disabilities:      [address] Impairments/Disabilities:976220707}    Nutrition Therapy:  Current Nutrition Therapy:   [address] Diet List:060345690}    Routes of Feeding: {[facility] DME Other Feedings:710016271}  Liquids: {Slp liquid thickness:19191}  Daily Fluid Restriction: {CHP DME Yes amt example:673190003}  Last Modified Barium Swallow with Video (Video Swallowing Test): {Done Not Done NLAR:327665205}    Treatments at the Time of Hospital Discharge:   Respiratory Treatments: ***  Oxygen Therapy:  {Therapy; copd oxygen:13217}  Ventilator:    { CC Vent SQWN:932817119}    Rehab Therapies: {THERAPEUTIC INTERVENTION:2418074116}  Weight Bearing Status/Restrictions: [address]  Weight Bearin}  Other Medical Equipment (for information only, NOT a DME order):  {EQUIPMENT:518783122}  Other Treatments: ***    Patient's personal belongings (please select all that are sent with patient):  {[facility] DME Belongings:811276397}    RN SIGNATURE:  {Esignature:730539105}    CASE MANAGEMENT/SOCIAL WORK SECTION    Inpatient Status Date: ***    Readmission Risk Assessment Score:  Readmission Risk              Risk of Unplanned Readmission:        0           Discharging to Facility/ Agency   · Name:   · Address:  · Phone:  · Fax:    Dialysis Facility (if applicable)   · Name:  · Address:  · Dialysis Schedule:  · Phone:  · Fax:    /Social

## 2020-01-07 NOTE — ED PROVIDER NOTES
[address] ED  eMERGENCY dEPARTMENT eNCOUnter      Pt Name: Mckinley Soto  MRN: [id number]  [place] 1950  Date of evaluation: 1/7/20      CHIEF COMPLAINT       Chief Complaint   Patient presents with    Cough    Shortness of Breath     HISTORY OF PRESENT ILLNESS   HPI 78 y.o. female presents with complaints of cough shortness of breath and wheezing for the last 3 weeks. Symptoms progressively worsening.  presided cough medicine without any improvement. Denies any fevers. Cough is productive of a greenish sputum. No chest pain. She has been having expiratory wheezing according to the family. No weight gain or leg edema. REVIEW OF SYSTEMS       Review of Systems   Constitutional: Negative for fever. HENT: Positive for rhinorrhea and sore throat. Negative for congestion. Eyes: Negative for visual disturbance. Respiratory: Positive for cough, shortness of breath and wheezing. Cardiovascular: Negative for chest pain. Gastrointestinal: Negative for abdominal pain, nausea and vomiting. Genitourinary: Negative for dysuria. Musculoskeletal: Negative for back pain. Skin: Negative for rash. Neurological: Negative for headaches. Hematological: Negative for adenopathy.        PAST MEDICAL HISTORY     Past Medical History:   Diagnosis Date    Arthritis     Cellulitis and abscess of unspecified site     CHF (congestive heart failure) ([address]     Chronic kidney disease, unspecified     Coronary atherosclerosis of native coronary artery     Essential hypertension, malignant     Hard of hearing     Iron deficiency anemia, unspecified     Myocardial infarction ([address]     Other and unspecified hyperlipidemia     Type II or unspecified type diabetes mellitus without mention of complication, not stated as uncontrolled     Unspecified asthma(493.90)     Unspecified venous (peripheral) insufficiency     Unspecified vitamin D deficiency        SURGICAL HISTORY       Past Surgical History:   Procedure Laterality Date    CORONARY ARTERY BYPASS GRAFT      TONSILLECTOMY AND ADENOIDECTOMY         CURRENT MEDICATIONS       Discharge Medication List as of 1/7/2020  8:24 PM      CONTINUE these medications which have NOT CHANGED    Details   guaiFENesin (ALTARUSSIN) 100 MG/5ML syrup Take 10 mLs by mouth every 4 hours as needed for Cough or Congestion, Disp-236 mL, R-0Normal      !! Blood Pressure KIT 3 TIMES DAILY Starting Thu 12/19/2019, Disp-1 kit, R-0, Normal      fenofibrate micronized (LOFIBRA) 200 MG capsule Take 1 capsule by mouth every morning (before breakfast), Disp-90 capsule, R-3Normal      furosemide (LASIX) 40 MG tablet Take 1 tablet by mouth 2 times daily, Disp-180 tablet, R-0Normal      FREESTYLE LANCETS MISC DAILY Starting Wed 12/18/2019, Disp-90 each, R-3, NormalPatient needs to contact office before any further refills will be approved      blood glucose test strips (FREESTYLE LITE) strip DAILY Starting Wed 12/18/2019, Disp-100 each, R-3, NormalAs needed. !! Blood Pressure KIT DAILY Starting Wed 12/18/2019, Disp-1 kit, R-0, Print      clobetasol (TEMOVATE) 0.05 % ointment Apply topically 2 times daily.  For 7days, Disp-45 g, R-0, Normal      glipiZIDE (GLUCOTROL) 5 MG tablet Take 1 tablet by mouth daily, Disp-180 tablet, R-0Adjust Sig      desloratadine (CLARINEX) 5 MG tablet Take 1 tablet by mouth daily Patient needs to contact office before any further refills will be approved, Disp-30 tablet, R-0Normal      blood glucose monitor kit and supplies 1 kit by Other route three times daily Dispense Butterfly Elite CBG Device, Other, 3 TIMES DAILY Starting Tue 8/20/2019, Disp-1 kit, R-0, Normal      simvastatin (ZOCOR) 20 MG tablet Take 1 tablet by mouth nightly, Disp-90 tablet, R-3Normal      clopidogrel (PLAVIX) 75 MG tablet Take 1 tablet by mouth daily, Disp-90 tablet, R-3Normal      metoprolol tartrate (LOPRESSOR) 50 MG tablet Take 1 tablet by mouth 2 times daily, Disp-180

## 2020-01-08 NOTE — ED NOTES
Report given to Jhoan Jacobsen RN from ED. Report method in person   The following was reviewed with receiving RN:   Current vital signs:  BP (!) 157/77   Pulse 90   Temp 97.4 °F (36.3 °C)   Resp 18   Ht 4' 11\" (1.499 m)   Wt 141 lb (64 kg)   SpO2 100%   BMI 28.48 kg/m²                      Any medication or safety alerts were reviewed. Any pending diagnostics and notifications were also reviewed, as well as any safety concerns or issues, abnormal labs, abnormal imaging, and abnormal assessment findings. Questions were answered.             Yves Hernandez RN  01/07/20 5214

## 2020-01-10 ENCOUNTER — OFFICE VISIT (OUTPATIENT)
Dept: INTERNAL MEDICINE CLINIC | Age: 70
End: 2020-01-10
Payer: MEDICARE

## 2020-01-10 VITALS
OXYGEN SATURATION: 94 % | DIASTOLIC BLOOD PRESSURE: 82 MMHG | HEIGHT: 59 IN | BODY MASS INDEX: 27.42 KG/M2 | HEART RATE: 87 BPM | WEIGHT: 136 LBS | RESPIRATION RATE: 15 BRPM | SYSTOLIC BLOOD PRESSURE: 138 MMHG

## 2020-01-10 PROCEDURE — 1123F ACP DISCUSS/DSCN MKR DOCD: CPT | Performed by: INTERNAL MEDICINE

## 2020-01-10 PROCEDURE — 99213 OFFICE O/P EST LOW 20 MIN: CPT | Performed by: INTERNAL MEDICINE

## 2020-01-10 PROCEDURE — 1090F PRES/ABSN URINE INCON ASSESS: CPT | Performed by: INTERNAL MEDICINE

## 2020-01-10 PROCEDURE — 3046F HEMOGLOBIN A1C LEVEL >9.0%: CPT | Performed by: INTERNAL MEDICINE

## 2020-01-10 PROCEDURE — 3017F COLORECTAL CA SCREEN DOC REV: CPT | Performed by: INTERNAL MEDICINE

## 2020-01-10 PROCEDURE — G8484 FLU IMMUNIZE NO ADMIN: HCPCS | Performed by: INTERNAL MEDICINE

## 2020-01-10 PROCEDURE — 1036F TOBACCO NON-USER: CPT | Performed by: INTERNAL MEDICINE

## 2020-01-10 PROCEDURE — 4040F PNEUMOC VAC/ADMIN/RCVD: CPT | Performed by: INTERNAL MEDICINE

## 2020-01-10 PROCEDURE — G8417 CALC BMI ABV UP PARAM F/U: HCPCS | Performed by: INTERNAL MEDICINE

## 2020-01-10 PROCEDURE — G8400 PT W/DXA NO RESULTS DOC: HCPCS | Performed by: INTERNAL MEDICINE

## 2020-01-10 PROCEDURE — 2022F DILAT RTA XM EVC RTNOPTHY: CPT | Performed by: INTERNAL MEDICINE

## 2020-01-10 PROCEDURE — G8427 DOCREV CUR MEDS BY ELIG CLIN: HCPCS | Performed by: INTERNAL MEDICINE

## 2020-01-10 ASSESSMENT — PATIENT HEALTH QUESTIONNAIRE - PHQ9
SUM OF ALL RESPONSES TO PHQ9 QUESTIONS 1 & 2: 0
1. LITTLE INTEREST OR PLEASURE IN DOING THINGS: 0
SUM OF ALL RESPONSES TO PHQ QUESTIONS 1-9: 0
2. FEELING DOWN, DEPRESSED OR HOPELESS: 0
SUM OF ALL RESPONSES TO PHQ QUESTIONS 1-9: 0

## 2020-01-10 NOTE — PROGRESS NOTES
diclofenac sodium 1 % GEL APPLY 4 GRAMS TOPICALLY THREE TIMES DAILY FOR 30 DAYS      azithromycin (ZITHROMAX Z-HINA) 250 MG tablet Take 2 tablets (500 mg) on Day 1, and then take 1 tablet (250 mg) on days 2 through 5. 1 packet 0    albuterol sulfate HFA (PROVENTIL HFA) 108 (90 Base) MCG/ACT inhaler Inhale 1-2 puffs into the lungs every 4 hours as needed for Wheezing 1 Inhaler 0    guaiFENesin (ALTARUSSIN) 100 MG/5ML syrup Take 10 mLs by mouth every 4 hours as needed for Cough or Congestion 236 mL 0    Blood Pressure KIT 1 kit by Does not apply route three times daily 1 kit 0    fenofibrate micronized (LOFIBRA) 200 MG capsule Take 1 capsule by mouth every morning (before breakfast) 90 capsule 3    furosemide (LASIX) 40 MG tablet Take 1 tablet by mouth 2 times daily 180 tablet 0    FREESTYLE LANCETS MISC 1 each by Does not apply route daily Patient needs to contact office before any further refills will be approved 90 each 3    blood glucose test strips (FREESTYLE LITE) strip 1 each by In Vitro route daily As needed. 100 each 3    Blood Pressure KIT 1 each by Does not apply route daily 1 kit 0    clobetasol (TEMOVATE) 0.05 % ointment Apply topically 2 times daily.  For 7days 45 g 0    glipiZIDE (GLUCOTROL) 5 MG tablet Take 1 tablet by mouth daily 180 tablet 0    desloratadine (CLARINEX) 5 MG tablet Take 1 tablet by mouth daily Patient needs to contact office before any further refills will be approved 30 tablet 0    blood glucose monitor kit and supplies 1 kit by Other route three times daily Dispense Butterfly Elite CBG Device 1 kit 0    simvastatin (ZOCOR) 20 MG tablet Take 1 tablet by mouth nightly 90 tablet 3    clopidogrel (PLAVIX) 75 MG tablet Take 1 tablet by mouth daily 90 tablet 3    metoprolol tartrate (LOPRESSOR) 50 MG tablet Take 1 tablet by mouth 2 times daily 180 tablet 3    metFORMIN (GLUCOPHAGE) 500 MG tablet Take 1 tablet by mouth 2 times daily (with meals) 180 tablet 3    allopurinol (ZYLOPRIM) 100 MG tablet Take 1 tablet by mouth daily 14 tablet 0    aspirin EC 81 MG EC tablet Take 1 tablet by mouth daily. 30 tablet 3    ammonium lactate (AMLACTIN) 12 % cream Apply 2 g topically as needed for Dry Skin. Apply topically as needed.  Calcium Carbonate-Vitamin D (OYSTER SHELL CALCIUM/D) 500-200 MG-UNIT TABS Take 1 tablet by mouth daily 30 tablet 6     No current facility-administered medications for this visit. OBJECTIVE:  Vitals:    01/10/20 1314   BP: 138/82   Pulse: 87   Resp: 15   SpO2: 94%     Body mass index is 27.45 kg/m². General exam (except above):  General appearance - well appearing, alert, in no acute distress  Head - Atraumatic, normocephalic  Eyes - EOMI, no jaundice or pallor  Lungs - Lungs clear to auscultation. No wheezing, rhonchi, rales  Heart - RRR without murmur, gallop, or rubs. No ectopy  Abdomen - Abdomen soft, non-tender. Bowel sounds normal. No masses, organomegaly  Extremities -No significant edema, or skin discoloration. Good capillary refill. Neuro - Pt Alert, awake and oriented x 3. No gross focal neurological deficits    ASSESSMENT AND PLAN (MEDICAL DECISION MAKING):   M[facility] Hardy was seen today for follow-up from hospital and asthma. Diagnoses and all orders for this visit:    Acute bronchitis, unspecified organism  Comments:  continue wiht albuterol inhaler as needed  complete Z-pack   recovering appropriately    Type 2 diabetes mellitus with complication ([facility]    Encounter for screening mammogram for breast cancer           Follow up in: 3mth    Greater than 15 minutes spent face-to-face with patient of which more than 50% was spent reviewing the past medical history, addressing the current concerns, counseling and formulating a mutual plan to help coordinate the care    This note was partially generated using Dragon voice recognition system, any errors noted are unintentional and are due to this technology.     Amy Kearney MD

## 2020-02-20 ENCOUNTER — OFFICE VISIT (OUTPATIENT)
Dept: FAMILY MEDICINE CLINIC | Age: 70
End: 2020-02-20
Payer: MEDICARE

## 2020-02-20 VITALS
DIASTOLIC BLOOD PRESSURE: 60 MMHG | WEIGHT: 142 LBS | HEART RATE: 74 BPM | BODY MASS INDEX: 28.63 KG/M2 | HEIGHT: 59 IN | SYSTOLIC BLOOD PRESSURE: 120 MMHG | OXYGEN SATURATION: 96 %

## 2020-02-20 PROBLEM — M1A.9XX1 GOUT WITH TOPHI: Status: ACTIVE | Noted: 2020-02-20

## 2020-02-20 PROBLEM — N18.30 BENIGN HYPERTENSION WITH CKD (CHRONIC KIDNEY DISEASE) STAGE III (HCC): Status: ACTIVE | Noted: 2020-02-20

## 2020-02-20 PROBLEM — I12.9 BENIGN HYPERTENSION WITH CKD (CHRONIC KIDNEY DISEASE) STAGE III (HCC): Status: ACTIVE | Noted: 2020-02-20

## 2020-02-20 PROBLEM — H91.93 BILATERAL HEARING LOSS: Status: ACTIVE | Noted: 2020-02-20

## 2020-02-20 PROBLEM — I50.32 CHRONIC DIASTOLIC CHF (CONGESTIVE HEART FAILURE) (HCC): Status: ACTIVE | Noted: 2020-02-20

## 2020-02-20 PROBLEM — J44.9 CHRONIC OBSTRUCTIVE PULMONARY DISEASE (HCC): Status: ACTIVE | Noted: 2020-02-20

## 2020-02-20 PROBLEM — E78.5 HYPERLIPIDEMIA WITH TARGET LDL LESS THAN 70: Status: ACTIVE | Noted: 2020-02-20

## 2020-02-20 PROBLEM — L85.3 DRY SKIN DERMATITIS: Status: ACTIVE | Noted: 2020-02-20

## 2020-02-20 PROCEDURE — G8926 SPIRO NO PERF OR DOC: HCPCS | Performed by: FAMILY MEDICINE

## 2020-02-20 PROCEDURE — 3023F SPIROM DOC REV: CPT | Performed by: FAMILY MEDICINE

## 2020-02-20 PROCEDURE — G8400 PT W/DXA NO RESULTS DOC: HCPCS | Performed by: FAMILY MEDICINE

## 2020-02-20 PROCEDURE — 3017F COLORECTAL CA SCREEN DOC REV: CPT | Performed by: FAMILY MEDICINE

## 2020-02-20 PROCEDURE — 4040F PNEUMOC VAC/ADMIN/RCVD: CPT | Performed by: FAMILY MEDICINE

## 2020-02-20 PROCEDURE — 1036F TOBACCO NON-USER: CPT | Performed by: FAMILY MEDICINE

## 2020-02-20 PROCEDURE — 2022F DILAT RTA XM EVC RTNOPTHY: CPT | Performed by: FAMILY MEDICINE

## 2020-02-20 PROCEDURE — G8484 FLU IMMUNIZE NO ADMIN: HCPCS | Performed by: FAMILY MEDICINE

## 2020-02-20 PROCEDURE — G8427 DOCREV CUR MEDS BY ELIG CLIN: HCPCS | Performed by: FAMILY MEDICINE

## 2020-02-20 PROCEDURE — G8417 CALC BMI ABV UP PARAM F/U: HCPCS | Performed by: FAMILY MEDICINE

## 2020-02-20 PROCEDURE — 1123F ACP DISCUSS/DSCN MKR DOCD: CPT | Performed by: FAMILY MEDICINE

## 2020-02-20 PROCEDURE — 3046F HEMOGLOBIN A1C LEVEL >9.0%: CPT | Performed by: FAMILY MEDICINE

## 2020-02-20 PROCEDURE — 1090F PRES/ABSN URINE INCON ASSESS: CPT | Performed by: FAMILY MEDICINE

## 2020-02-20 PROCEDURE — 99204 OFFICE O/P NEW MOD 45 MIN: CPT | Performed by: FAMILY MEDICINE

## 2020-02-20 RX ORDER — M-VIT,TX,IRON,MINS/CALC/FOLIC 27MG-0.4MG
1 TABLET ORAL DAILY
COMMUNITY
End: 2022-02-15 | Stop reason: SDUPTHER

## 2020-02-20 RX ORDER — ALLOPURINOL 300 MG/1
300 TABLET ORAL DAILY
Qty: 90 TABLET | Refills: 0
Start: 2020-02-20 | End: 2021-03-18 | Stop reason: SDUPTHER

## 2020-02-20 RX ORDER — PETROLATUM 42 G/100G
OINTMENT TOPICAL
Qty: 454 G | Refills: 3 | Status: ON HOLD | OUTPATIENT
Start: 2020-02-20 | End: 2021-02-18

## 2020-02-20 RX ORDER — ATORVASTATIN CALCIUM 40 MG/1
40 TABLET, FILM COATED ORAL DAILY
Qty: 90 TABLET | Refills: 3 | Status: SHIPPED | OUTPATIENT
Start: 2020-02-20 | End: 2021-03-08 | Stop reason: SDUPTHER

## 2020-02-20 ASSESSMENT — ENCOUNTER SYMPTOMS
ABDOMINAL DISTENTION: 0
VOMITING: 0
SINUS PAIN: 0
DIARRHEA: 0
NAUSEA: 0
RHINORRHEA: 1
COUGH: 0
ABDOMINAL PAIN: 0
SHORTNESS OF BREATH: 1
WHEEZING: 0
SINUS PRESSURE: 0
CHEST TIGHTNESS: 0
CONSTIPATION: 0

## 2020-02-20 ASSESSMENT — PATIENT HEALTH QUESTIONNAIRE - PHQ9
SUM OF ALL RESPONSES TO PHQ9 QUESTIONS 1 & 2: 0
SUM OF ALL RESPONSES TO PHQ QUESTIONS 1-9: 0
SUM OF ALL RESPONSES TO PHQ QUESTIONS 1-9: 0
1. LITTLE INTEREST OR PLEASURE IN DOING THINGS: 0
2. FEELING DOWN, DEPRESSED OR HOPELESS: 0

## 2020-02-20 NOTE — PROGRESS NOTES
Visit Information    Have you changed or started any medications since your last visit including any over-the-counter medicines, vitamins, or herbal medicines? no   Are you having any side effects from any of your medications? -  no  Have you stopped taking any of your medications? Is so, why? -  no    Have you seen any other physician or provider since your last visit? No  Have you had any other diagnostic tests since your last visit? No  Have you been seen in the emergency room and/or had an admission to a hospital since we last saw you? Yes - Records Obtained  Have you had your routine dental cleaning in the past 6 months? no    Have you activated your [employer] account? If not, what are your barriers?  Yes     Patient Care Team:  Molly Lee MD as PCP - General (Family Medicine)  Ajit Hayes MD as PCP - [facility]    Medical History Review  Past Medical, Family, and Social History reviewed and does contribute to the patient presenting condition    Health Maintenance   Topic Date Due    Hepatitis C screen  1950    DTaP/Tdap/Td vaccine (1 - Tdap) 08/16/1960    Hepatitis B vaccine (1 of 3 - Risk 3-dose series) 08/16/1968    Shingles Vaccine (1 of 2) 08/16/1999    Diabetic retinal exam  09/09/2015    Breast cancer screen  06/01/2017    Colon Cancer Screen FIT/FOBT  03/29/2018    Diabetic foot exam  10/01/2019    Diabetic microalbuminuria test  02/27/2020    Lipid screen  02/27/2020    Pneumococcal 65+ years Vaccine (1 of 1 - PPSV23) 04/02/2020 (Originally 8/16/2014)    Flu vaccine (1) 12/18/2020 (Originally 9/1/2019)    Annual Wellness Visit (AWV)  01/10/2022 (Originally 5/29/2019)    A1C test (Diabetic or Prediabetic)  12/18/2020    Potassium monitoring  12/18/2020    Creatinine monitoring  12/18/2020    DEXA (modify frequency per FRAX score)  Completed    Hepatitis A vaccine  Aged Out    Hib vaccine  Aged Out    Meningococcal (ACWY) vaccine  Aged Out

## 2020-02-20 NOTE — PROGRESS NOTES
Chief Complaint   Patient presents with    New Patient     To establish    Diabetes    Congestive Heart Failure    Hypertension    Rash     hands, worsening    Hyperlipidemia    Gout     hands    Hearing Problem    Health Maintenance     Refuses shots, she is agreeable for FIT and mammogram        Prior PCP: Dr. Joen Kawasaki. Patient is here to establish care with new PCP    Comes with her daughter. Hypertension, congestive heart failure, coronary artery disease   she  is not exercising and is adherent to low salt diet. Blood pressure is well controlled at home. Cardiac symptoms dyspnea, fatigue and lower extremity edema. Patient reports Her symptoms are at baseline. She does have varicose veins for which she might have compression stockings at home. Patient denies chest pain, chest pressure/discomfort, claudication, exertional chest pressure/discomfort, irregular heart beat, near-syncope, orthopnea, palpitations, paroxysmal nocturnal dyspnea, syncope and tachypnea. Cardiovascular risk factors: advanced age (older than 54 for men, 72 for women), diabetes mellitus, dyslipidemia, hypertension, microalbuminuria and smoking/ tobacco exposure. Use of agents associated with hypertension: none. History of target organ damage: angina/ prior myocardial infarction, chronic kidney disease, heart failure and prior coronary revascularization.       Patient says she had Stent in [place] in 2000  Then a few years later she got CABG x3  She failed to follow-up with Dr. Marilynne Cabot    blood pressure is Normal.    BP Readings from Last 3 Encounters:   02/20/20 120/60   01/10/20 138/82   01/07/20 (!) 209/92        Pulse is Normal.    Pulse Readings from Last 3 Encounters:   02/20/20 74   01/10/20 87   01/07/20 100       Weight is stable  Wt Readings from Last 3 Encounters:   02/20/20 142 lb (64.4 kg)   01/10/20 136 lb (61.7 kg)   01/07/20 141 lb (64 kg)         No results found for: LVEF, LVEFMODE          Diabetes Mellitus Type II, Follow-up: Patient here for follow-up of Type 2 diabetes mellitus. Current symptoms/problems include hyperglycemia and visual disturbances and have been stable. Symptoms have been present for several years. Known diabetic complications: nephropathy and cardiovascular disease  Cardiovascular risk factors: advanced age (older than 54 for men, 72 for women), diabetes mellitus, dyslipidemia, hypertension, microalbuminuria and smoking/ tobacco exposure     Current diabetic medications include oral agents (dual therapy): glipizide (Glucotrol), Glucophage. Eye exam current (within one year): no   Cataracts, she reports blurry vision, worsening vision, both eyes. She would like a referral    Prior visit with dietician: no  Patient says she is very confused about what she can eat and what she cannot eat with so many medical problems    Current diet: diabetic  Current exercise: none    Current monitoring regimen: home blood tests - daily  Home blood sugar records: fasting range: 120, 101, 134  Any episodes of hypoglycemia? yes -sometimes in low 90s, she says prior PCP decreased her glipizide to once a day and she did not have it anymore since then. Is She on ACE inhibitor or angiotensin II receptor blocker? No     A1c stable very well controlled  Lab Results   Component Value Date    LABA1C 5.8 12/18/2019    LABA1C 5.8 02/27/2019    LABA1C 6.6 02/13/2019     Urine microalbumin very high last year    Lab Results   Component Value Date    LABMICR 155 (H) 02/27/2019       COPD  Patient reports dyspnea on exertion at baseline. She denies cough   Patient says she was sick with bronchitis in November and January, but now she is much better. She has known allergic rhinitis. She still has some Runny nose \"a bit\", clear mucus. She does clear her throat often   Occasionally has sneezing    Quit smoking in 2000    CXR  1/7/20 showed chronic changes consistent with COPD. I do not find PFTs.   She uses any further refills will be approved 30 tablet 0    blood glucose monitor kit and supplies 1 kit by Other route three times daily Dispense Butterfly Elite CBG Device 1 kit 0    simvastatin (ZOCOR) 20 MG tablet Take 1 tablet by mouth nightly 90 tablet 3    clopidogrel (PLAVIX) 75 MG tablet Take 1 tablet by mouth daily 90 tablet 3    metoprolol tartrate (LOPRESSOR) 50 MG tablet Take 1 tablet by mouth 2 times daily 180 tablet 3    metFORMIN (GLUCOPHAGE) 500 MG tablet Take 1 tablet by mouth 2 times daily (with meals) 180 tablet 3    allopurinol (ZYLOPRIM) 100 MG tablet Take 1 tablet by mouth daily (Patient taking differently: Take 300 mg by mouth daily ) 14 tablet 0    aspirin EC 81 MG EC tablet Take 1 tablet by mouth daily. 30 tablet 3    diclofenac sodium 1 % GEL APPLY 4 GRAMS TOPICALLY THREE TIMES DAILY FOR 30 DAYS      FREESTYLE LANCETS MISC 1 each by Does not apply route daily Patient needs to contact office before any further refills will be approved 90 each 3    ammonium lactate (AMLACTIN) 12 % cream Apply 2 g topically as needed for Dry Skin. Apply topically as needed. No current facility-administered medications for this visit.         Allergies   Allergen Reactions    Aleve [Naproxen Sodium]      Chronic kidney disease stage III, CHF    Pioglitazone Other (See Comments)     Congestive heart failure    Claritin [Loratadine]     Keflex [Cephalexin]     Lisinopril      Needs clarification of contraindication         Past Medical History:   Diagnosis Date    Arthritis     Cellulitis and abscess     Cellulitis and abscess of unspecified site     CHF (congestive heart failure) (HCC)     Chronic kidney disease, unspecified     Coronary atherosclerosis of native coronary artery     Essential hypertension, malignant     Hard of hearing     Iron deficiency anemia, unspecified     Myocardial infarction ([facility] [address]     Other and unspecified hyperlipidemia     Pure hypercholesterolemia     Type II or unspecified type diabetes mellitus without mention of complication, not stated as uncontrolled     Unspecified asthma(493.90)     Unspecified venous (peripheral) insufficiency     Unspecified vitamin D deficiency      Past Surgical History:   Procedure Laterality Date    CORONARY ARTERY BYPASS GRAFT      x 3, Dr. Dominique Walker       Family History   Problem Relation Age of Onset    Diabetes Mother     Heart Disease Mother     Heart Disease Father     Diabetes Sister     High Blood Pressure Sister     Diabetes Maternal Grandmother      Social History     Tobacco Use    Smoking status: Former Smoker     Packs/day: 0.25     Years: 10.00     Pack years: 2.50     Last attempt to quit: 2000     Years since quittin.1    Smokeless tobacco: Never Used   Substance Use Topics    Alcohol use: Yes     Alcohol/week: 0.0 standard drinks    Drug use: No             The patient's past medical, surgical, social, and family history as well as her current medications and allergies were reviewed asdocumented in today's encounter. Review of Systems   Constitutional: Positive for fatigue. Negative for activity change, appetite change, chills, diaphoresis, fever and unexpected weight change. HENT: Positive for congestion, hearing loss, postnasal drip, rhinorrhea and sneezing. Negative for ear discharge, ear pain, sinus pressure and sinus pain. Eyes: Positive for visual disturbance. Respiratory: Positive for shortness of breath (FLORES). Negative for cough, chest tightness and wheezing. Cardiovascular: Positive for leg swelling. Negative for chest pain and palpitations. Gastrointestinal: Negative for abdominal distention, abdominal pain, constipation, diarrhea, nausea and vomiting. Endocrine: Negative for cold intolerance, heat intolerance, polydipsia, polyphagia and polyuria. Genitourinary: Negative for difficulty urinating.    Musculoskeletal: Positive for arthralgias and joint swelling (fingers). Allergic/Immunologic: Positive for environmental allergies. Neurological: Negative for dizziness and headaches. Hematological: Negative for adenopathy. Does not bruise/bleed easily. Psychiatric/Behavioral: Negative for dysphoric mood and sleep disturbance. The patient is nervous/anxious.          -vital signs stable and within normal limits except Overweight per BMI. /60   Pulse 74   Ht 4' 11\" (1.499 m)   Wt 142 lb (64.4 kg)   SpO2 96%   BMI 28.68 kg/m²        Physical Exam  Vitals signs and nursing note reviewed. Constitutional:       General: She is not in acute distress. Appearance: Normal appearance. She is well-developed. She is not diaphoretic. HENT:      Head: Normocephalic and atraumatic. Right Ear: External ear normal. Decreased hearing noted. No middle ear effusion. Tympanic membrane is scarred. Left Ear: External ear normal. Decreased hearing noted. No middle ear effusion. Tympanic membrane is scarred. Ears:      Comments: Screening by finger rub bilaterally very decreased unable to perceive the finger rub. Nose: Congestion present. No mucosal edema or rhinorrhea. Right Sinus: No maxillary sinus tenderness or frontal sinus tenderness. Left Sinus: No maxillary sinus tenderness or frontal sinus tenderness. Mouth/Throat:      Mouth: Mucous membranes are moist.      Pharynx: No posterior oropharyngeal erythema. Eyes:      General: Lids are normal. No scleral icterus. Right eye: No discharge. Left eye: No discharge. Conjunctiva/sclera: Conjunctivae normal.   Neck:      Musculoskeletal: Normal range of motion and neck supple. Thyroid: No thyromegaly. Cardiovascular:      Rate and Rhythm: Normal rate and regular rhythm. Heart sounds: Murmur present. Crescendo  systolic murmur present with a grade of 3/6.       Comments: Varicose veins bilateral legs, advised compression stockings from over-the-counter, if she does not find them at home  Pulmonary:      Effort: Pulmonary effort is normal. No respiratory distress. Breath sounds: Normal breath sounds. No wheezing or rales. Chest:      Chest wall: No tenderness. Abdominal:      General: Bowel sounds are normal. There is no distension. Palpations: Abdomen is soft. There is no hepatomegaly or splenomegaly. Tenderness: There is no abdominal tenderness. Musculoskeletal: Normal range of motion. General: Tenderness and deformity present. Right lower le+ Pitting Edema present. Left lower le+ Pitting Edema present. Comments: Thoracic kyphosis noted. Multiple tophi  on the fingers, she follows with her rheumatologist. Up and go test more than 12 seconds. High risk for falls. Declines PT at this time, will address at the next appointment   Skin:     General: Skin is warm and dry. Capillary Refill: Capillary refill takes less than 2 seconds. Findings: No rash. Neurological:      Mental Status: She is alert and oriented to person, place, and time. Cranial Nerves: No cranial nerve deficit. Motor: No abnormal muscle tone. Gait: Gait abnormal.   Psychiatric:         Mood and Affect: Mood normal.         Behavior: Behavior normal.         Thought Content: Thought content normal.         Judgment: Judgment normal.         Most recent labs reviewed with patient, and all questions answered.   Chronic kidney disease stage III stable  Mildly high triglycerides    Otherwise labs within normal limits       Lab Results   Component Value Date    WBC 8.0 2017    HGB 13.6 2017    HCT 41.4 2017    MCV 85.8 2017     2017       Lab Results   Component Value Date     2019    K 3.7 2019     2019    CO2 26 2019    BUN 49 2019    CREATININE 1.07 2019    GLUCOSE 77 2019    CALCIUM 9.9 2019 Lab Results   Component Value Date    ALT 13 03/29/2017    AST 22 03/29/2017    ALKPHOS 54 03/29/2017    BILITOT 0.45 03/29/2017       Lab Results   Component Value Date    TSH 1.51 05/04/2015       Lab Results   Component Value Date    CHOL 159 02/27/2019    CHOL 181 03/29/2017    CHOL 174 05/04/2015     Lab Results   Component Value Date    TRIG 214 (H) 02/27/2019    TRIG 300 (H) 03/29/2017    TRIG 198 (H) 05/04/2015     Lab Results   Component Value Date    HDL 48 02/27/2019    HDL 43 03/29/2017    HDL 51 05/04/2015     Lab Results   Component Value Date    LDLCHOLESTEROL 68 02/27/2019    LDLCHOLESTEROL 78 03/29/2017    LDLCHOLESTEROL 83 05/04/2015     Lab Results   Component Value Date    CHOLHDLRATIO 3.3 02/27/2019    CHOLHDLRATIO 4.2 03/29/2017    CHOLHDLRATIO 3.4 05/04/2015         Lab Results   Component Value Date    RHGTWILG00 366 07/20/2012     No results found for: FOLATE  No results found for: VITD25    ASSESSMENT AND PLAN      1. Chronic diastolic CHF (congestive heart failure) (HCC)  Stable  I was unable to find ejection fraction in her chart, we will update her echo 2D  - Brain Natriuretic Peptide; Future  - ECHO Complete 2D W Doppler W Color; Future  - AFL - Shaun Walker MD, Cardiology, [place]; Future  Continue beta-blocker and furosemide    2. Coronary artery disease involving native coronary artery of native heart without angina pectoris, CABG x3  Stable  Continue Plavix, aspirin, metoprolol, and start high intensity Lipitor  Will refer to cardiologist as she failed to follow-up for many years  - ECHO Complete 2D W Doppler W Color; Future  - AFL - Shaun Wlaker MD, Cardiology, [place]; Future    3. Type 2 diabetes mellitus with stage 3 chronic kidney disease, without long-term current use of insulin (HCC)  Stable  Lab Results   Component Value Date    LABA1C 5.8 12/18/2019    LABA1C 5.8 02/27/2019    LABA1C 6.6 02/13/2019       - CBC; Future  - Comprehensive Metabolic Panel;  Future  - TSH complete a risk assessment for falls. A plan of care for falls in-office gait and balance testing performed using The Timed Up and Go Test was positive for increased falls risk, home safety tips provided, patient declines any further evaluation/treatment for increased falls risk    Lab Results   Component Value Date    LDLCHOLESTEROL 68 02/27/2019    (goal LDL reduction with dx if diabetes is 50% LDL reduction)    Negative depression screening  PHQ Scores 2/20/2020 1/10/2020 2/13/2019 10/1/2018 3/3/2017 3/9/2015   PHQ2 Score 0 0 0 0 0 0   PHQ9 Score 0 0 0 0 0 0       The patient's past medical, surgical, social, and family history as well as her   current medications and allergies were reviewed as documented in today's encounter. Medications, labs, diagnostic studies, consultations and follow-up as documented in this encounter. Return in about 8 weeks (around 4/16/2020) for ALWAYS NEEDS 30 MIN. APPT, HTN,HLP, DM2, HTN, HLP, LABS F/U. Patient was seen with total face to face time of 45 minutes. More than 50% of this visit was counseling and education. Future Appointments   Date Time Provider Angela Reyez   3/20/2020  2:30 PM Darwin Posada MD [address]   4/14/2020  3:30 PM Shaila De La O MD [address]       This note was completed by using the assistance of a speech-recognition program. However, inadvertent computerized transcription errors may be present. Although every effort was made to ensure accuracy, no guarantees can be provided that every mistake has been identified and corrected by editing.   Electronically signed by Shaila De La O MD on 2/20/2020 at 7:25 PM

## 2020-03-03 RX ORDER — METOPROLOL TARTRATE 50 MG/1
50 TABLET, FILM COATED ORAL 2 TIMES DAILY
Qty: 180 TABLET | Refills: 3 | Status: SHIPPED | OUTPATIENT
Start: 2020-03-03 | End: 2021-06-14 | Stop reason: SDUPTHER

## 2020-03-11 ENCOUNTER — HOSPITAL ENCOUNTER (OUTPATIENT)
Age: 70
Discharge: HOME | End: 2020-03-11
Payer: MEDICARE

## 2020-03-11 LAB
-: ABNORMAL
ALBUMIN SERPL-MCNC: 4.4 G/DL (ref 3.5–5.2)
ALBUMIN/GLOBULIN RATIO: ABNORMAL (ref 1–2.5)
ALP BLD-CCNC: 74 U/L (ref 35–104)
ALT SERPL-CCNC: 16 U/L (ref 5–33)
AMORPHOUS: ABNORMAL
ANION GAP SERPL CALCULATED.3IONS-SCNC: 16 MMOL/L (ref 9–17)
AST SERPL-CCNC: 25 U/L
BACTERIA: ABNORMAL
BILIRUB SERPL-MCNC: 0.44 MG/DL (ref 0.3–1.2)
BILIRUBIN URINE: NEGATIVE
BNP INTERPRETATION: ABNORMAL
BUN BLDV-MCNC: 73 MG/DL (ref 8–23)
BUN/CREAT BLD: ABNORMAL (ref 9–20)
CALCIUM SERPL-MCNC: 10 MG/DL (ref 8.6–10.4)
CASTS UA: ABNORMAL /LPF
CHLORIDE BLD-SCNC: 98 MMOL/L (ref 98–107)
CHOLESTEROL/HDL RATIO: 3.4
CHOLESTEROL: 188 MG/DL
CO2: 24 MMOL/L (ref 20–31)
COLOR: YELLOW
COMMENT UA: ABNORMAL
CREAT SERPL-MCNC: 1.01 MG/DL (ref 0.5–0.9)
CRYSTALS, UA: ABNORMAL /HPF
EPITHELIAL CELLS UA: ABNORMAL /HPF
FOLATE: >20 NG/ML
GFR AFRICAN AMERICAN: >60 ML/MIN
GFR NON-AFRICAN AMERICAN: 54 ML/MIN
GFR SERPL CREATININE-BSD FRML MDRD: ABNORMAL ML/MIN/{1.73_M2}
GFR SERPL CREATININE-BSD FRML MDRD: ABNORMAL ML/MIN/{1.73_M2}
GLUCOSE BLD-MCNC: 121 MG/DL (ref 70–99)
GLUCOSE URINE: NEGATIVE
HBV SURFACE AB TITR SER: <3.5 MIU/ML
HCT VFR BLD CALC: 40.9 % (ref 36–46)
HDLC SERPL-MCNC: 55 MG/DL
HEMOGLOBIN: 13.1 G/DL (ref 12–16)
HEPATITIS C ANTIBODY: NONREACTIVE
KETONES, URINE: NEGATIVE
LDL CHOLESTEROL: 77 MG/DL (ref 0–130)
LEUKOCYTE ESTERASE, URINE: NEGATIVE
MAGNESIUM: 1.9 MG/DL (ref 1.6–2.6)
MCH RBC QN AUTO: 29.4 PG (ref 26–34)
MCHC RBC AUTO-ENTMCNC: 32.1 G/DL (ref 31–37)
MCV RBC AUTO: 91.6 FL (ref 80–100)
MUCUS: ABNORMAL
NITRITE, URINE: NEGATIVE
NRBC AUTOMATED: ABNORMAL PER 100 WBC
OTHER OBSERVATIONS UA: ABNORMAL
PDW BLD-RTO: 15.1 % (ref 11.5–14.9)
PH UA: 5 (ref 5–8)
PHOSPHORUS: 5.2 MG/DL (ref 2.6–4.5)
PLATELET # BLD: 222 K/UL (ref 150–450)
PMV BLD AUTO: 8.2 FL (ref 6–12)
POTASSIUM SERPL-SCNC: 3.4 MMOL/L (ref 3.7–5.3)
PRO-BNP: 627 PG/ML
PROTEIN UA: ABNORMAL
RBC # BLD: 4.47 M/UL (ref 4–5.2)
RBC UA: ABNORMAL /HPF
RENAL EPITHELIAL, UA: ABNORMAL /HPF
SODIUM BLD-SCNC: 138 MMOL/L (ref 135–144)
SPECIFIC GRAVITY UA: 1.01 (ref 1–1.03)
TOTAL PROTEIN: 7.6 G/DL (ref 6.4–8.3)
TRICHOMONAS: ABNORMAL
TRIGL SERPL-MCNC: 279 MG/DL
TSH SERPL DL<=0.05 MIU/L-ACNC: 2.18 MIU/L (ref 0.3–5)
TURBIDITY: CLEAR
URIC ACID: 5.5 MG/DL (ref 2.4–5.7)
URINE HGB: ABNORMAL
UROBILINOGEN, URINE: NORMAL
VITAMIN B-12: 1205 PG/ML (ref 232–1245)
VITAMIN D 25-HYDROXY: 26 NG/ML (ref 30–100)
VLDLC SERPL CALC-MCNC: ABNORMAL MG/DL (ref 1–30)
WBC # BLD: 5.9 K/UL (ref 3.5–11)
WBC UA: ABNORMAL /HPF
YEAST: ABNORMAL

## 2020-03-11 PROCEDURE — 82306 VITAMIN D 25 HYDROXY: CPT

## 2020-03-11 PROCEDURE — 84100 ASSAY OF PHOSPHORUS: CPT

## 2020-03-11 PROCEDURE — 80053 COMPREHEN METABOLIC PANEL: CPT

## 2020-03-11 PROCEDURE — 80061 LIPID PANEL: CPT

## 2020-03-11 PROCEDURE — 82607 VITAMIN B-12: CPT

## 2020-03-11 PROCEDURE — 82746 ASSAY OF FOLIC ACID SERUM: CPT

## 2020-03-11 PROCEDURE — 86317 IMMUNOASSAY INFECTIOUS AGENT: CPT

## 2020-03-11 PROCEDURE — 84443 ASSAY THYROID STIM HORMONE: CPT

## 2020-03-11 PROCEDURE — 85027 COMPLETE CBC AUTOMATED: CPT

## 2020-03-11 PROCEDURE — 83880 ASSAY OF NATRIURETIC PEPTIDE: CPT

## 2020-03-11 PROCEDURE — 36415 COLL VENOUS BLD VENIPUNCTURE: CPT

## 2020-03-11 PROCEDURE — 87086 URINE CULTURE/COLONY COUNT: CPT

## 2020-03-11 PROCEDURE — 83735 ASSAY OF MAGNESIUM: CPT

## 2020-03-11 PROCEDURE — 86803 HEPATITIS C AB TEST: CPT

## 2020-03-11 PROCEDURE — 84550 ASSAY OF BLOOD/URIC ACID: CPT

## 2020-03-11 PROCEDURE — 81001 URINALYSIS AUTO W/SCOPE: CPT

## 2020-03-13 LAB
CULTURE: NO GROWTH
Lab: NORMAL
SPECIMEN DESCRIPTION: NORMAL

## 2020-03-17 ENCOUNTER — HOSPITAL ENCOUNTER (OUTPATIENT)
Dept: DIABETES SERVICES | Age: 70
Setting detail: THERAPIES SERIES
Discharge: HOME | End: 2020-03-17
Payer: MEDICARE

## 2020-03-17 PROCEDURE — G0108 DIAB MANAGE TRN  PER INDIV: HCPCS

## 2020-03-17 NOTE — LETTER
[facility]  [address] [address]  Phone: [phone]         March 19, 2020    To: Marisela Barraza MD    From: Casper Rowan RD, LD    Patient: Gilbert Ojeda   YOB: 1950   Date of Visit: 3/17/20 JW     An initial assessment to determine diabetes education needs was completed on 3/17/20. Education plan includes:interpretation of lab results, blood sugar goals, complications of diabetes mellitus, hypoglycemia prevention and treatment, exercise, illness management, self-monitoring of blood glucose skills, nutrition and carbohydrate counting. Patient's main concern is what to eat with the diet restrictions she has: low purine for gout, low salt and fat for heart health and carb controlled with diabetes. Has gotten conflicting info. Praised pt re: DM control as her last HgbA1c was 5.8%    An ongoing plan was created to include: follow up in about 3 months (wants in person and to wait til COVID-19 breaks)    Thank you for the opportunity to provide Diabetes Self Management Education to your patient.

## 2020-03-19 SDOH — ECONOMIC STABILITY: FOOD INSECURITY: ADDITIONAL INFORMATION: NO

## 2020-03-19 NOTE — PROGRESS NOTES
Diabetes Self- Management Education Program Assessment -   Also see Diabetic Screening  Patient, Yogesh Baird,  here for diabetes self-management education  visit/ assessment. Today's visit was in an individual setting. Diet History :  Diet Questionnaire and typical meal /portion sheet completed  [x]Yes  [] NO    Goals setting:  Goal work sheet completed  []Yes  [x] NO  (SEE  EDUCATION AND GOALS FLOWSHEET)    MEDICAL HISTORY:  Past Medical History:   Diagnosis Date    Arthritis     Cellulitis and abscess     Cellulitis and abscess of unspecified site     CHF (congestive heart failure) (HCC)     Chronic kidney disease, unspecified     Coronary atherosclerosis of native coronary artery     Essential hypertension, malignant     Hard of hearing     Iron deficiency anemia, unspecified     Myocardial infarction ([facility] [address]     Other and unspecified hyperlipidemia     Pure hypercholesterolemia     Type II or unspecified type diabetes mellitus without mention of complication, not stated as uncontrolled     Unspecified asthma(493.90)     Unspecified venous (peripheral) insufficiency     Unspecified vitamin D deficiency      Family History   Problem Relation Age of Onset    Diabetes Mother     Heart Disease Mother     Heart Disease Father     Diabetes Sister     High Blood Pressure Sister     Diabetes Maternal Grandmother      Aleve [naproxen sodium]; Pioglitazone; Claritin [loratadine]; Keflex [cephalexin]; and Lisinopril     There is no immunization history on file for this patient.   Current Medications  Current Outpatient Medications   Medication Sig Dispense Refill    metFORMIN (GLUCOPHAGE) 500 MG tablet Take 1 tablet by mouth 2 times daily (with meals) 180 tablet 3    metoprolol tartrate (LOPRESSOR) 50 MG tablet Take 1 tablet by mouth 2 times daily 180 tablet 3    Multiple Vitamins-Minerals (THERAPEUTIC MULTIVITAMIN-MINERALS) tablet Take 1 tablet by mouth daily      atorvastatin (LIPITOR) 40 but that since bs are good focus on low purine. Gave sample menu and verbal instructions b/c of poor eyesight. What to eat - Food groups, When to eat - timing of meals and snacks, and How much to eat - portions control. Used dinner plate method for demonstrating healthy eating. About 1500 calories/ day   CHO choices/ meal  3,1,3,1,3,1   CHO choices/  day   grams of protein /day   gram of fat /day     Correctly read food labels & demonstrate CHO counting & portion control with personalized meal plan. Identify dining out strategies, & dietary sick day guidelines. 2 3/17/20 JW      Incorporating physical activity into lifestyle:   Verbalize effect of exercise on blood glucose levels; benefits of regular exercise; safety considerations; contraindications; maintenance of activity. 2    Doesn't get around well physically. Using medications safely:  Identify effects of diabetes medicines on blood glucose levels; List diabetes medication taken, action & side effects;    1    Was on glipizide but d/c/ed. Now just metformin. Insulin / Injectable - Appropriate injection sites; proper storage; supplies needed; proper technique; safe needle disposal guidelines. na       Monitoring blood glucose, interpreting and using results:  Identify recommended & personal blood glucose targets; importance of testing; testing supplies; HgbA1C target levels; Factors affecting blood glucose; Importance of logging blood glucose levels for pattern recognition; ketone testing; safe lancet disposal.   3    Checks bs 1-2 x/day fbs and 2 hour pp supper. HgbA1c 5.8%   Prevention, detection & treatment of acute complications:  Identify symptoms of hyper & hypoglycemia, and prevention & treatment strategies. 2    Reports not having extremes with bs lows or highs. Describe sick day guidelines & indications for  physician notification. Identify short term consequences of poor control.    1       Prevention, detection & treatment of Healthy I  Continuing Your Journey with Diabetes map handout, Individualized Diabetes report card    [] Self-Management Class 4 - BD Booklet  Sick Day Rules and  Dinning Out Guide , recipe hand outs and tips, diabetes Cookbooks  ( when available)     []Self-Management - 3 month follow - up  AADE booklet Side by Cyrus Lorenzo a partnership approach to diabetes self- care, [place] Quit line, [facility] Diabetes support program information sheet, [facility] information sheet       []Self-Management  Gestational - RN class -Gestational Diabetes Mellitus ( GDM) toolkit form [place] gestational diabetes postpartum care learning collaborative 2018. Gestational diabetes handout from [facility] jan 2016  \"Simple Guidelines for meal planning with gestational diabetes\" handout 3 meals and 3 snacks  SMBG sheets to fax back to MFM weekly  BD  healthy injection site selection and rotation with 6 mm insulin syringe and 4 mm pen needle  Did you have gestational diabetes when you were pregnant? Handout from [facility]  April 2014    []Self-Management Gestational - RD class - My Food Plan for Gestational diabetes    []Glucose Meter     []Insulin Kit     []Other      Encounter Type Date Start Time End Time Comments No Show Dates   Assessment 3/17/20 JW   11:30 12:45   [x]In Person with daughter, Rashad Brady who has Type 2 DM and very supportive  []Telephone    Class 1 - Understanding diabetes         Class 2- Nutrition and diabetes          Class 3 - Preventing Complications         Class 4 -  In depth Nutrition and sick day care        Class 5 - 3 month follow up / goal reassessment        Gestational - RN         Gestational - RD        Individual MNT         Shared Med Appt         Yearly Follow-up        Meter Instrx        Insulin Instrx      []Pen  []Vial & Syringe      DSMS Support :   [] MNT      [] Annual update     [] Starting Fresh  adults living with diabetes or pre diabetes.  [address]  [address] [place], [place]    [id number]-7:30pm- on 6 week rotation  Free -  REGISTRATION IS REQUIRED - VEOR [phone] call for dates    []  Diabetes Group at  [place] [address] of [place] - Free 6 week diabetes education support   classes - contact : [address] [facility] [phone]  for dates and locations      []ADA  Where do I Begin, Living with Type 2 diabetes ADA home support program  Web site: diabetes. org/living    Call: 1800 DIABETES  e-mail: [email or web address]     []  Internet web sites - ADAWeb site: diabetes. org/living   D- life   [] [facility] opens at 8 30 am daily for walkers, shops open at 8 am   [address] [place] [address], [address] [place] [address]   [phone] Daily - 8:30am - 10am    3rd Tuesday of every month [facility] expert speakers visit from 9 - 10am        [] [facility], [facility], [facility] [place], [facility] henao (site rotate)  Call [phone] or visit   website: [email or web address] for more details   Check web site for updated times/ dates       S[] maggie Senior [address]  Free class   [facility]  [address] [facility], [facility] [address] [facility] Tuesday and Thursday -   9 :30 am- 10:30am - ongoing   [] [address]  [address] [facility], [address] [facility] [address] [facility] [address] [address] [facility] Thursday 11:00am - 11:45am     [] Third Wednesday Cooking  Class  Free  Registration is required     [address] [facility], [facility] [address]. [address] [facility], [address] [facility] [phone] or   Email [email or web address]   Wednesdays-5:00- 6:00 pm       [] Eat Smart  Be Active & Learn How - Free   Families & grandparents with children in home 0- 25 & pregnant moms          Various sites in community - call to find next session near you.   [facility] extension office for future sessions - Oneyda Montiel  Email : [email or web address]  Phone: [phone]     [] (SNAP-Ed)   - free nutrition education   - adults and youth, who are eligible for the Supplemental Nutrition Assistance Program    Various sites in community - call to find next session near you. [facility] SNAP-Ed  contact Kym Rich, Email:[phone]           Post Education Referrals:      [] [place] Quit information sheet and [address] [facility] , [address]       [] Dental care - Dental care of [facility]     [] [facility] ([facility]) link  phone number - for information and referral to [facility], WOUND, WEIGHT MANAGEMENT        []Other  David Og RD, LD

## 2020-06-08 RX ORDER — FUROSEMIDE 40 MG/1
TABLET ORAL
Qty: 180 TABLET | Refills: 0 | Status: SHIPPED | OUTPATIENT
Start: 2020-06-08 | End: 2020-09-11 | Stop reason: SDUPTHER

## 2020-06-29 RX ORDER — DESLORATADINE 5 MG/1
5 TABLET ORAL DAILY
Qty: 90 TABLET | Refills: 3 | Status: SHIPPED | OUTPATIENT
Start: 2020-06-29 | End: 2020-10-01 | Stop reason: SDUPTHER

## 2020-07-25 ENCOUNTER — APPOINTMENT (OUTPATIENT)
Dept: CT IMAGING | Age: 70
End: 2020-07-25
Payer: MEDICARE

## 2020-07-25 ENCOUNTER — APPOINTMENT (OUTPATIENT)
Dept: GENERAL RADIOLOGY | Age: 70
End: 2020-07-25
Payer: MEDICARE

## 2020-07-25 ENCOUNTER — APPOINTMENT (OUTPATIENT)
Dept: MRI IMAGING | Age: 70
End: 2020-07-25
Payer: MEDICARE

## 2020-07-25 ENCOUNTER — HOSPITAL ENCOUNTER (EMERGENCY)
Age: 70
Discharge: ANOTHER HOSPITAL | End: 2020-07-26
Attending: EMERGENCY MEDICINE
Payer: MEDICARE

## 2020-07-25 PROBLEM — I10 ESSENTIAL HYPERTENSION: Chronic | Status: ACTIVE | Noted: 2020-07-25

## 2020-07-25 PROBLEM — I63.9 ACUTE CVA (CEREBROVASCULAR ACCIDENT) (HCC): Status: ACTIVE | Noted: 2020-07-25

## 2020-07-25 LAB
-: ABNORMAL
ABSOLUTE BANDS #: 1.89 K/UL (ref 0–1)
ABSOLUTE EOS #: 0 K/UL (ref 0–0.4)
ABSOLUTE IMMATURE GRANULOCYTE: ABNORMAL K/UL (ref 0–0.3)
ABSOLUTE LYMPH #: 0.17 K/UL (ref 1–4.8)
ABSOLUTE MONO #: 0.34 K/UL (ref 0.1–1.3)
ALBUMIN SERPL-MCNC: 2.9 G/DL (ref 3.5–5.2)
ALBUMIN/GLOBULIN RATIO: ABNORMAL (ref 1–2.5)
ALP BLD-CCNC: 237 U/L (ref 35–104)
ALT SERPL-CCNC: 27 U/L (ref 5–33)
AMORPHOUS: ABNORMAL
ANION GAP SERPL CALCULATED.3IONS-SCNC: 15 MMOL/L (ref 9–17)
AST SERPL-CCNC: 27 U/L
BACTERIA: ABNORMAL
BANDS: 11 % (ref 0–10)
BASOPHILS # BLD: 0 % (ref 0–2)
BASOPHILS ABSOLUTE: 0 K/UL (ref 0–0.2)
BILIRUB SERPL-MCNC: 0.6 MG/DL (ref 0.3–1.2)
BILIRUBIN URINE: NEGATIVE
BUN BLDV-MCNC: 37 MG/DL (ref 8–23)
BUN/CREAT BLD: ABNORMAL (ref 9–20)
CALCIUM SERPL-MCNC: 9.3 MG/DL (ref 8.6–10.4)
CASTS UA: ABNORMAL /LPF
CASTS UA: ABNORMAL /LPF
CHLORIDE BLD-SCNC: 99 MMOL/L (ref 98–107)
CO2: 22 MMOL/L (ref 20–31)
COLOR: YELLOW
COMMENT UA: ABNORMAL
CREAT SERPL-MCNC: 0.9 MG/DL (ref 0.5–0.9)
CRYSTALS, UA: ABNORMAL /HPF
D-DIMER QUANTITATIVE: 0.71 MG/L FEU (ref 0–0.59)
DIFFERENTIAL TYPE: ABNORMAL
EKG ATRIAL RATE: 112 BPM
EKG P AXIS: 81 DEGREES
EKG P-R INTERVAL: 174 MS
EKG Q-T INTERVAL: 304 MS
EKG QRS DURATION: 88 MS
EKG QTC CALCULATION (BAZETT): 414 MS
EKG R AXIS: 0 DEGREES
EKG T AXIS: 161 DEGREES
EKG VENTRICULAR RATE: 112 BPM
EOSINOPHILS RELATIVE PERCENT: 0 % (ref 0–4)
EPITHELIAL CELLS UA: ABNORMAL /HPF
FERRITIN: 155 UG/L (ref 13–150)
FIBRINOGEN: 610 MG/DL (ref 210–530)
GFR AFRICAN AMERICAN: >60 ML/MIN
GFR NON-AFRICAN AMERICAN: >60 ML/MIN
GFR SERPL CREATININE-BSD FRML MDRD: ABNORMAL ML/MIN/{1.73_M2}
GFR SERPL CREATININE-BSD FRML MDRD: ABNORMAL ML/MIN/{1.73_M2}
GLUCOSE BLD-MCNC: 148 MG/DL (ref 65–105)
GLUCOSE BLD-MCNC: 157 MG/DL (ref 70–99)
GLUCOSE URINE: ABNORMAL
HCT VFR BLD CALC: 43.6 % (ref 36–46)
HEMOGLOBIN: 13.7 G/DL (ref 12–16)
IMMATURE GRANULOCYTES: ABNORMAL %
INR BLD: 1
KETONES, URINE: NEGATIVE
LACTATE DEHYDROGENASE: 210 U/L (ref 135–214)
LACTIC ACID: 1.8 MMOL/L (ref 0.5–2.2)
LEUKOCYTE ESTERASE, URINE: NEGATIVE
LYMPHOCYTES # BLD: 1 % (ref 24–44)
MAGNESIUM: 1.6 MG/DL (ref 1.6–2.6)
MCH RBC QN AUTO: 28.6 PG (ref 26–34)
MCHC RBC AUTO-ENTMCNC: 31.5 G/DL (ref 31–37)
MCV RBC AUTO: 90.7 FL (ref 80–100)
MONOCYTES # BLD: 2 % (ref 1–7)
MORPHOLOGY: ABNORMAL
MUCUS: ABNORMAL
NITRITE, URINE: NEGATIVE
NRBC AUTOMATED: ABNORMAL PER 100 WBC
OTHER OBSERVATIONS UA: ABNORMAL
PARTIAL THROMBOPLASTIN TIME: 27.8 SEC (ref 24–36)
PDW BLD-RTO: 16.9 % (ref 11.5–14.9)
PH UA: 6 (ref 5–8)
PLATELET # BLD: 168 K/UL (ref 150–450)
PLATELET ESTIMATE: ABNORMAL
PMV BLD AUTO: 8.9 FL (ref 6–12)
POTASSIUM SERPL-SCNC: 3.1 MMOL/L (ref 3.7–5.3)
PROTEIN UA: ABNORMAL
PROTHROMBIN TIME: 13 SEC (ref 11.8–14.6)
RBC # BLD: 4.81 M/UL (ref 4–5.2)
RBC # BLD: ABNORMAL 10*6/UL
RBC UA: ABNORMAL /HPF
RENAL EPITHELIAL, UA: ABNORMAL /HPF
SARS-COV-2, PCR: NORMAL
SARS-COV-2, RAPID: NOT DETECTED
SARS-COV-2: NORMAL
SEG NEUTROPHILS: 86 % (ref 36–66)
SEGMENTED NEUTROPHILS ABSOLUTE COUNT: 14.8 K/UL (ref 1.3–9.1)
SODIUM BLD-SCNC: 136 MMOL/L (ref 135–144)
SOURCE: NORMAL
SPECIFIC GRAVITY UA: 1.02 (ref 1–1.03)
TOTAL PROTEIN: 6.4 G/DL (ref 6.4–8.3)
TRICHOMONAS: ABNORMAL
TROPONIN INTERP: ABNORMAL
TROPONIN INTERP: ABNORMAL
TROPONIN T: ABNORMAL NG/ML
TROPONIN T: ABNORMAL NG/ML
TROPONIN, HIGH SENSITIVITY: 28 NG/L (ref 0–14)
TROPONIN, HIGH SENSITIVITY: 31 NG/L (ref 0–14)
TURBIDITY: CLEAR
URINE HGB: ABNORMAL
UROBILINOGEN, URINE: NORMAL
WBC # BLD: 17.2 K/UL (ref 3.5–11)
WBC # BLD: ABNORMAL 10*3/UL
WBC UA: ABNORMAL /HPF
YEAST: ABNORMAL

## 2020-07-25 PROCEDURE — 6360000004 HC RX CONTRAST MEDICATION: Performed by: EMERGENCY MEDICINE

## 2020-07-25 PROCEDURE — 83735 ASSAY OF MAGNESIUM: CPT

## 2020-07-25 PROCEDURE — 85379 FIBRIN DEGRADATION QUANT: CPT

## 2020-07-25 PROCEDURE — 70553 MRI BRAIN STEM W/O & W/DYE: CPT

## 2020-07-25 PROCEDURE — 85730 THROMBOPLASTIN TIME PARTIAL: CPT

## 2020-07-25 PROCEDURE — 70498 CT ANGIOGRAPHY NECK: CPT

## 2020-07-25 PROCEDURE — 87040 BLOOD CULTURE FOR BACTERIA: CPT

## 2020-07-25 PROCEDURE — 71260 CT THORAX DX C+: CPT

## 2020-07-25 PROCEDURE — 96367 TX/PROPH/DG ADDL SEQ IV INF: CPT

## 2020-07-25 PROCEDURE — 81001 URINALYSIS AUTO W/SCOPE: CPT

## 2020-07-25 PROCEDURE — 85610 PROTHROMBIN TIME: CPT

## 2020-07-25 PROCEDURE — 82947 ASSAY GLUCOSE BLOOD QUANT: CPT

## 2020-07-25 PROCEDURE — 80053 COMPREHEN METABOLIC PANEL: CPT

## 2020-07-25 PROCEDURE — 6360000002 HC RX W HCPCS: Performed by: EMERGENCY MEDICINE

## 2020-07-25 PROCEDURE — 96365 THER/PROPH/DIAG IV INF INIT: CPT

## 2020-07-25 PROCEDURE — 99285 EMERGENCY DEPT VISIT HI MDM: CPT

## 2020-07-25 PROCEDURE — 83605 ASSAY OF LACTIC ACID: CPT

## 2020-07-25 PROCEDURE — 82728 ASSAY OF FERRITIN: CPT

## 2020-07-25 PROCEDURE — 85025 COMPLETE CBC W/AUTO DIFF WBC: CPT

## 2020-07-25 PROCEDURE — 71045 X-RAY EXAM CHEST 1 VIEW: CPT

## 2020-07-25 PROCEDURE — 2580000003 HC RX 258: Performed by: EMERGENCY MEDICINE

## 2020-07-25 PROCEDURE — A9579 GAD-BASE MR CONTRAST NOS,1ML: HCPCS | Performed by: EMERGENCY MEDICINE

## 2020-07-25 PROCEDURE — 87086 URINE CULTURE/COLONY COUNT: CPT

## 2020-07-25 PROCEDURE — 36415 COLL VENOUS BLD VENIPUNCTURE: CPT

## 2020-07-25 PROCEDURE — 96366 THER/PROPH/DIAG IV INF ADDON: CPT

## 2020-07-25 PROCEDURE — 93005 ELECTROCARDIOGRAM TRACING: CPT

## 2020-07-25 PROCEDURE — 84484 ASSAY OF TROPONIN QUANT: CPT

## 2020-07-25 PROCEDURE — 6370000000 HC RX 637 (ALT 250 FOR IP): Performed by: EMERGENCY MEDICINE

## 2020-07-25 PROCEDURE — U0002 COVID-19 LAB TEST NON-CDC: HCPCS

## 2020-07-25 PROCEDURE — 70450 CT HEAD/BRAIN W/O DYE: CPT

## 2020-07-25 PROCEDURE — 96375 TX/PRO/DX INJ NEW DRUG ADDON: CPT

## 2020-07-25 PROCEDURE — 85384 FIBRINOGEN ACTIVITY: CPT

## 2020-07-25 PROCEDURE — 83615 LACTATE (LD) (LDH) ENZYME: CPT

## 2020-07-25 RX ORDER — ACETAMINOPHEN 500 MG
1000 TABLET ORAL ONCE
Status: COMPLETED | OUTPATIENT
Start: 2020-07-25 | End: 2020-07-25

## 2020-07-25 RX ORDER — 0.9 % SODIUM CHLORIDE 0.9 %
80 INTRAVENOUS SOLUTION INTRAVENOUS ONCE
Status: COMPLETED | OUTPATIENT
Start: 2020-07-25 | End: 2020-07-25

## 2020-07-25 RX ORDER — ACETAMINOPHEN 650 MG/1
650 SUPPOSITORY RECTAL EVERY 6 HOURS PRN
Status: DISCONTINUED | OUTPATIENT
Start: 2020-07-25 | End: 2020-07-25

## 2020-07-25 RX ORDER — METOPROLOL TARTRATE 50 MG/1
50 TABLET, FILM COATED ORAL ONCE
Status: COMPLETED | OUTPATIENT
Start: 2020-07-26 | End: 2020-07-26

## 2020-07-25 RX ORDER — SODIUM CHLORIDE 0.9 % (FLUSH) 0.9 %
10 SYRINGE (ML) INJECTION PRN
Status: DISCONTINUED | OUTPATIENT
Start: 2020-07-25 | End: 2020-07-26 | Stop reason: SDUPTHER

## 2020-07-25 RX ORDER — ACETAMINOPHEN 325 MG/1
650 TABLET ORAL EVERY 6 HOURS PRN
Status: DISCONTINUED | OUTPATIENT
Start: 2020-07-25 | End: 2020-07-25

## 2020-07-25 RX ORDER — POTASSIUM CHLORIDE 20 MEQ/1
40 TABLET, EXTENDED RELEASE ORAL ONCE
Status: COMPLETED | OUTPATIENT
Start: 2020-07-25 | End: 2020-07-25

## 2020-07-25 RX ADMIN — VANCOMYCIN HYDROCHLORIDE 1500 MG: 1.5 INJECTION, POWDER, LYOPHILIZED, FOR SOLUTION INTRAVENOUS at 12:52

## 2020-07-25 RX ADMIN — GADOTERIDOL 15 ML: 279.3 INJECTION, SOLUTION INTRAVENOUS at 17:33

## 2020-07-25 RX ADMIN — IOVERSOL 75 ML: 741 INJECTION INTRA-ARTERIAL; INTRAVENOUS at 13:47

## 2020-07-25 RX ADMIN — ACETAMINOPHEN 1000 MG: 500 TABLET, FILM COATED ORAL at 19:23

## 2020-07-25 RX ADMIN — Medication 10 ML: at 17:33

## 2020-07-25 RX ADMIN — SODIUM CHLORIDE 80 ML: 9 INJECTION, SOLUTION INTRAVENOUS at 13:47

## 2020-07-25 RX ADMIN — ACETAMINOPHEN 1000 MG: 500 TABLET, FILM COATED ORAL at 11:55

## 2020-07-25 RX ADMIN — IOVERSOL 75 ML: 741 INJECTION INTRA-ARTERIAL; INTRAVENOUS at 13:27

## 2020-07-25 RX ADMIN — SODIUM CHLORIDE 80 ML: 9 INJECTION, SOLUTION INTRAVENOUS at 13:27

## 2020-07-25 RX ADMIN — Medication 10 ML: at 13:27

## 2020-07-25 RX ADMIN — POTASSIUM CHLORIDE 40 MEQ: 1500 TABLET, EXTENDED RELEASE ORAL at 19:25

## 2020-07-25 RX ADMIN — PIPERACILLIN AND TAZOBACTAM 4.5 G: 4; .5 INJECTION, POWDER, LYOPHILIZED, FOR SOLUTION INTRAVENOUS; PARENTERAL at 12:08

## 2020-07-25 RX ADMIN — Medication 10 ML: at 13:48

## 2020-07-25 ASSESSMENT — PAIN SCALES - GENERAL
PAINLEVEL_OUTOF10: 9
PAINLEVEL_OUTOF10: 3

## 2020-07-25 NOTE — ED NOTES
Report given to Kal Gomez RN from ED. Report method in person   The following was reviewed with receiving RN:   Current vital signs:  BP (!) 179/60   Pulse 95   Temp 100.2 °F (37.9 °C) (Oral)   Resp 23   Ht 4' 11\" (1.499 m)   Wt 142 lb (64.4 kg)   SpO2 96%   BMI 28.68 kg/m²                MEWS Score: 2     Any medication or safety alerts were reviewed. Any pending diagnostics and notifications were also reviewed, as well as any safety concerns or issues, abnormal labs, abnormal imaging, and abnormal assessment findings. Questions were answered.             Yuli Gramajo RN  07/25/20 Sharron Haro

## 2020-07-25 NOTE — ED NOTES
Neurointerventionist at [facility], Eliazar Lee, returned call to [address] at [phone]. Consult was completed at this time.      Momo Kruger  07/25/20 1121

## 2020-07-25 NOTE — ED NOTES
[facility] center called at [phone] regarding stroke alert. Neurointerventionist at .V's paged at [employer]. Code S called at [phone].      Fatmata Kruger  07/25/20 1117

## 2020-07-25 NOTE — ED NOTES
Dr Yani Brown and this RN informed patient and patient's daughter about transfer to [facility]. V's due to results of MRI.       Katharine Dutta RN  07/25/20 8688

## 2020-07-25 NOTE — ED NOTES
Mode of arrival (squad #, walk in, police, etc) : [facility] 11        Chief complaint(s): headache, fever         Arrival Note (brief scenario, treatment PTA, etc). : [facility] 11 states they were called out to patient's home for a possible stroke. Upon arrival [facility] 11 states patient didn't have any signs or symptoms of stroke. Patient's only complaint was a headache. [facility] 11 states they were informed that patient had a fall with head injury about 2 weeks ago and refused treatment at that time. ION 7/24/2020 1900. Patient's family states patient became confused and complained of a severe headache. Upon arrival to ED patient is alert and oriented to person and place. When asked questions, patient will answer some correctly, but has a difficult time concentrating. Patient will also speak incomprehensible sentences and struggle to find the correct words to speak. Patient states she has pain all over. C= \"Have you ever felt that you should Cut down on your drinking? \"  No  A= \"Have people Annoyed you by criticizing your drinking? \"  No  G= \"Have you ever felt bad or Guilty about your drinking? \"  No  E= \"Have you ever had a drink as an Eye-opener first thing in the morning to steady your nerves or to help a hangover? \"  No      Deferred []      Reason for deferring: N/A    *If yes to two or more: probable alcohol abuse. Vianey Reardon RN  07/25/20 [id number]

## 2020-07-25 NOTE — ED NOTES
Access center was called regarding transfer to [facility] at [address] [place] [address]. Neurosurgeon hayley was paged at [facility].      Mike Kruger  07/25/20 7327

## 2020-07-25 NOTE — ED NOTES
Patient's daughter is filling out MRI checklist at this time.       Salima Donaldson RN  07/25/20 2651

## 2020-07-25 NOTE — ED NOTES
Bed: B  Expected date:   Expected time:   Means of arrival:   Comments:  [facility]11     Ange Laureano RN  07/25/20 1047

## 2020-07-25 NOTE — ED PROVIDER NOTES
[facility] ED  Emergency Department Encounter  Emergency Medicine Attending Note     Pt Name: Michaela Flowers  MRN: [id number]  [place] 1950   Date of evaluation: 7/25/2020  PCP:  Best Townsend MD    [address] [place] [address]       Chief Complaint   Patient presents with    Headache    Fever       HISTORY OF PRESENT ILLNESS  (Location/Symptom, Timing/Onset, Context/Setting, Quality, Duration, Modifying Factors, Severity.)      Michaela Flowers is a 78 y.o. female who presents with headache and confusion. Patient states that she has a significant headache. However she is confused and unable to provide much history. I did talk to daughters are in the room, last saw her normal around 7 PM.  However last night with a doctor she is having some nonsensical speech. This morning they went to go check on her, and she was not making any sense. Would answer some questions correctly, with intra-often talk nonsense. No known deficits they know of. States that she did fall 2 weeks ago and struck her head, but would not get seen at that time. She is on Plavix. No known sick contacts they are aware of. Patient is not able to write history for me. PAST MEDICAL / SURGICAL / SOCIAL / FAMILY HISTORY     Past Medical History:  has a past medical history of Arthritis, Cellulitis and abscess, Cellulitis and abscess of unspecified site, CHF (congestive heart failure) ([address] Chronic kidney disease, unspecified, Coronary atherosclerosis of native coronary artery, Essential hypertension, malignant, Hard of hearing, Iron deficiency anemia, unspecified, Myocardial infarction ([address] Other and unspecified hyperlipidemia, Pure hypercholesterolemia, Type II or unspecified type diabetes mellitus without mention of complication, not stated as uncontrolled, Unspecified asthma(493.90), Unspecified venous (peripheral) insufficiency, and Unspecified vitamin D deficiency.      Past Surgical History:  has a past Pressure KIT 1 each by Does not apply route daily  Maribel Hayden MD   clobetasol (TEMOVATE) 0.05 % ointment Apply topically 2 times daily. For 7days  Maribel Hayden MD   blood glucose monitor kit and supplies 1 kit by Other route three times daily Dispense Butterfly Elite CBG Device  Last Mederos MD   clopidogrel (PLAVIX) 75 MG tablet Take 1 tablet by mouth daily  Dorothy Kurtz MD   aspirin EC 81 MG EC tablet Take 1 tablet by mouth daily. Last Mederos MD   ammonium lactate (AMLACTIN) 12 % cream Apply 2 g topically as needed for Dry Skin. Apply topically as needed. Historical Provider, MD     Please note that medications prescribed at discharge will auto-populate into this medication list when note is refreshed. Please look at prescription date andprescriber to clarify. Family History:family history includes Diabetes in her maternal grandmother, mother, and sister; Heart Disease in her father and mother; High Blood Pressure in her sister. Social History: She reports that she quit smoking about 20 years ago. She has a 2.50 pack-year smoking history. She has never used smokeless tobacco. She reports current alcohol use. She reports that she does not use drugs. She has no history on file for sexual activity. REVIEW OF SYSTEMS    (2-9 systems for level 4, 10 or more for level 5)      Review of Systems   Unable to perform ROS: Mental status change       PHYSICAL EXAM   (up to 7 for level 4, 8 or more for level 5)      Initial Vitals   ED Triage Vitals [07/25/20 1059]   BP Temp Temp Source Pulse Resp SpO2 Height Weight   (!) 191/51 100.3 °F (37.9 °C) Oral 96 15 95 % 4' 11\" (1.499 m) 142 lb (64.4 kg)       Physical Exam  Vitals signs and nursing note reviewed. Constitutional:       Comments: Patient is alert looking around, but appears to be ill, mildly diaphoretic. HENT:      Head: Normocephalic and atraumatic.       Mouth/Throat:      Comments: No facial droop  Eyes: Conjunctiva/sclera: Conjunctivae normal.      Pupils: Pupils are equal, round, and reactive to light. Neck:      Musculoskeletal: Normal range of motion. No neck rigidity or muscular tenderness. Cardiovascular:      Rate and Rhythm: Normal rate. Heart sounds: No murmur. No friction rub. No gallop. Pulmonary:      Effort: Pulmonary effort is normal.      Breath sounds: No wheezing or rhonchi. Abdominal:      Palpations: Abdomen is soft. There is no mass. Tenderness: There is no abdominal tenderness. Musculoskeletal:      Comments: Patient has no significant edema or swelling. No erythema of the legs. Skin:     General: Skin is warm. Findings: No bruising or erythema. Neurological:      Comments: Patient is alert and is able to tell me her name where she is, and was able to tell me what year it was, but then if you asked her any more complicated question she started drifting off. She was talking about the different colors of the room. And was having tangential thinking. Speech is intermittently slurred, and she did have difficulty with word finding at times. Had drift of bilateral lower extremities, however she just was not following two-step commands well. I did see her move her legs without difficulty. Therefore not sure if this is a real drift or not. INITIAL NIH STROKE SCALE    Time Performed:  10:55 AM     1a. Level of consciousness:  0 - alert; keenly responsive  1b. Level of consciousness questions:  0 - answers both questions correctly  1c. Level of consciousness questions:  1 - performs one task correctly  2. Best Gaze:  0 - normal  3. Visual:  0 - no visual loss  4. Facial Palsy:  0 - normal symmetric movement  5a. Motor left arm:  0 - no drift, limb holds 90 (or 45) degrees for full 10 seconds  5b. Motor right arm:  0 - no drift, limb holds 90 (or 45) degrees for full 10 seconds  6a.   Motor left le - some effort against gravity; leg falls to bed by 5 seconds but has some effort against gravity  6b. Motor right le - some effort against gravity; leg falls to bed by 5 seconds but has some effort against gravity  7. Limb Ataxia:  0 - absent  8. Sensory:  0 - normal; no sensory loss  9. Best Language:  1 - mild to moderate aphasia; some obvious loss of fluency or facility of comprehension without significant limitation on ideas expressed or form of expression. Reduction of speech and/or comprehension, however, makes conversation about provided materials difficult or impossible. For example, in conversation about provided materials, examiner can identify picture or naming card content from patient's response. 10.  Dysarthria:  1 - mild to moderate, patient slurs at least some words and at worst, can be understood with some difficulty  11. Extinction and Inattention:  0 - no abnormality    TOTAL:  7          DIFFERENTIAL DIAGNOSIS/IMPRESSION     DDX: Sepsis, infection source UTI versus chest,  CoVID, CVA, intracranial bleed     Impression: 78 y.o. female who presents with her, altered mental status, and fatigue. Initially family not at bedside, and patient was confabulating and having some word finding difficulty. She was mildly tachycardic in the upper 90s, and febrile. More of a suspicion of infectious etiology, but cannot rule out CVA. Was unable to initially get last known well. Within talk to daughter in the waiting room, and last known well was at 7 PM last night is also anyone to talk to her and she was normal.  Did state that she doctor later that night she had slurred nonsensical speech. Did have a fall 2 weeks ago. NIH is 7, but she has been out of the TPA window. I have a higher suspicion of metabolic versus infectious. Will get septic work-up, stroke work-up, and give Vanco and Zosyn. Plan for admission.     DIAGNOSTIC RESULTS     EKG: All EKG's are interpreted by the Emergency Department Physician who either signs or Hgb MOD (*)     Protein, UA 4+ (*)     All other components within normal limits   MICROSCOPIC URINALYSIS - Abnormal; Notable for the following components:    Bacteria, UA FEW (*)     Amorphous, UA 1+ (*)     All other components within normal limits   POC GLUCOSE FINGERSTICK - Abnormal; Notable for the following components:    POC Glucose 148 (*)     All other components within normal limits   CULTURE, BLOOD 1   CULTURE, BLOOD 1   CULTURE, URINE   PROTIME-INR   APTT   LACTATE DEHYDROGENASE   COVID-19   LACTIC ACID   MAGNESIUM   LACTIC ACID       RADIOLOGY: All plain film, CT, MRI, and formal ultrasound images (except ED bedside ultrasound) are read by the radiologist, see reports below, unless otherwise noted in ED Course, MDM or here. Ct Head Wo Contrast    Result Date: 7/25/2020  EXAMINATION: CT OF THE HEAD WITHOUT CONTRAST  7/25/2020 11:12 am TECHNIQUE: CT of the head was performed without the administration of intravenous contrast. Dose modulation, iterative reconstruction, and/or weight based adjustment of the mA/kV was utilized to reduce the radiation dose to as low as reasonably achievable. COMPARISON: None. HISTORY: ORDERING SYSTEM PROVIDED HISTORY: Severe headache with confusion TECHNOLOGIST PROVIDED HISTORY: Severe headache with confusion FINDINGS: BRAIN/VENTRICLES: There is no acute intracranial hemorrhage, mass effect or midline shift. No abnormal extra-axial fluid collection. Remote appearing lacunar infarction right basal ganglia. The gray-white differentiation is maintained without evidence of an acute infarct. There is no evidence of hydrocephalus. ORBITS: The visualized portion of the orbits demonstrate no acute abnormality. SINUSES: The visualized paranasal sinuses and mastoid air cells demonstrate no acute abnormality. SOFT TISSUES/SKULL:  No acute abnormality of the visualized skull or soft tissues. No acute intracranial abnormality.  Remote appearing lacunar infarction right basal ganglia. Xr Chest Portable    Result Date: 7/25/2020  EXAMINATION: ONE XRAY VIEW OF THE CHEST 7/25/2020 12:34 pm COMPARISON: X-ray chest two views 01/07/2020 HISTORY: ORDERING SYSTEM PROVIDED HISTORY: altered mental status TECHNOLOGIST PROVIDED HISTORY: altered mental status Reason for Exam: AMS Acuity: Acute Type of Exam: Initial FINDINGS: Previous sternotomy. Stable cardiomediastinal silhouette. Prominent pulmonary vasculature. Rounded opacity projected in the region of the right hilum is of uncertain etiology. Finding is unchanged compared to 01/07/2020. No focal consolidation, pleural effusion, pneumothorax. Mild pulmonary vascular congestion. Stable rounded opacity projected in the region the right hilum is of uncertain etiology but could represent right pulmonary artery en face. CT chest could be obtained for confirmation. Ct Chest Pulmonary Embolism W Contrast    Result Date: 7/25/2020  EXAMINATION: CTA OF THE CHEST 7/25/2020 1:33 pm TECHNIQUE: CTA of the chest was performed after the administration of intravenous contrast.  Multiplanar reformatted images are provided for review. MIP images are provided for review. Dose modulation, iterative reconstruction, and/or weight based adjustment of the mA/kV was utilized to reduce the radiation dose to as low as reasonably achievable. COMPARISON: None. HISTORY: ORDERING SYSTEM PROVIDED HISTORY: Abnormality in the hilum on chest xray TECHNOLOGIST PROVIDED HISTORY: Abnormality in the hilum on chest xray Reason for Exam: abnormal chest xray Acuity: Unknown Type of Exam: Unknown FINDINGS: Images are moderately degraded by respiratory motion. Pulmonary Arteries: Pulmonary arteries are adequately opacified for evaluation. No evidence of intraluminal filling defect to suggest pulmonary embolism. Dilatation of the main pulmonary artery to 3.6 cm. Mediastinum: No evidence of mediastinal lymphadenopathy.   The heart and pericardium demonstrate no acute abnormality. There is no acute abnormality of the thoracic aorta. Three-vessel coronary artery calcification. Lungs/pleura: The lungs are without acute process. No focal consolidation or pulmonary edema. No evidence of pleural effusion or pneumothorax. Upper Abdomen: 1.6 cm benign right adrenal myelolipoma. No further imaging is indicated for this finding. Partially imaged probable splenomegaly. Soft Tissues/Bones: No acute bone or soft tissue abnormality. Diffuse osteopenia with multilevel degenerative disc disease. 1. No evidence of pulmonary embolism or acute pulmonary abnormality. No mass lesion. 2. Dilatation of the main pulmonary artery to 3.6 cm which can be seen in pulmonary arterial hypertension. 3. Partially imaged splenomegaly of uncertain etiology. Cta Head Neck W Contrast    Result Date: 7/25/2020  EXAMINATION: CTA OF THE HEAD AND NECK WITH CONTRAST 7/25/2020 1:18 pm: TECHNIQUE: CTA of the head and neck was performed with the administration of intravenous contrast. Multiplanar reformatted images are provided for review. MIP images are provided for review. Stenosis of the internal carotid arteries measured using NASCET criteria. Dose modulation, iterative reconstruction, and/or weight based adjustment of the mA/kV was utilized to reduce the radiation dose to as low as reasonably achievable. COMPARISON: Noncontrast CT head from earlier today HISTORY: ORDERING SYSTEM PROVIDED HISTORY: Stroke alert TECHNOLOGIST PROVIDED HISTORY: Stroke alert Reason for Exam: stroke alert Acuity: Unknown Type of Exam: Unknown FINDINGS: CTA NECK: AORTIC ARCH/ARCH VESSELS: No dissection or arterial injury. No significant stenosis of the brachiocephalic or subclavian arteries.  CAROTID ARTERIES: There is moderate atherosclerotic disease at the bilateral carotid bulbs, contributing to 60% stenosis of the origin at the right internal carotid artery and 30% focal stenosis at the origin of the left internal carotid artery. No acute abnormality or flow-limiting stenosis in the remainder of the bilateral common or internal carotid arteries by NASCET criteria. VERTEBRAL ARTERIES: No dissection, arterial injury, or significant stenosis. SOFT TISSUES: The patient is status post median sternotomy. There is bronchial wall thickening, likely related to small airway disease or bronchiolitis. No cervical or superior mediastinal lymphadenopathy. The larynx and pharynx are unremarkable. No acute abnormality of the salivary and thyroid glands. BONES: No acute osseous abnormality. There are moderate degenerative changes in the cervical spine. CTA HEAD: ANTERIOR CIRCULATION: No significant stenosis of the intracranial internal carotid, anterior cerebral, or middle cerebral arteries. No aneurysm. POSTERIOR CIRCULATION: There is 50% focal stenosis in the distal P2 segment of the left PCA. No significant stenosis of the vertebral, basilar, or right posterior cerebral arteries. No aneurysm. OTHER: No dural venous sinus thrombosis on this non-dedicated study. BRAIN: There is a 2 cm extra-axial enhancing mass along the anterior aspect of the right middle cranial fossa, likely related to a meningioma (series 3, image 119). No midline shift. No extra-axial fluid collection. There is old infarction in the right head of caudate nucleus. No evidence of acute territorial infarction. 60% stenosis at the origin of the right internal carotid artery 30% focal stenosis at the origin of the left internal carotid artery. 50% focal stenosis in the distal P2 segment of the left PCA. 2 cm extra-axial enhancing mass along the anterior aspect of the right middle cranial fossa, likely related to a meningioma. Further evaluation with MRI brain with and without contrast is recommended. Old infarction in the right head of caudate nucleus.      Mri Brain W Wo Contrast    Result Date: 7/25/2020  EXAMINATION: MRI OF THE BRAIN WITHOUT AND WITH CONTRAST [facility] E    07/25/20 1713 07/25/20 1713  gadoteridol (PROHANCE) injection 15 mL  IMG ONCE PRN      Last MAR action:  Given - by Christ Walker on 07/25/20 at [address] [facility] STEVE E    07/25/20 1600 07/25/20 1546  potassium chloride (KLOR-CON M) extended release tablet 40 mEq  ONCE      Last MAR action:  Given - by Colonel Cruz on 07/25/20 at [address] [facility]E E    07/25/20 1345 07/25/20 1333  0.9 % sodium chloride bolus  ONCE      Last MAR action:  Stopped - by Lisandra Massed [facility] 07[facility]25/20 [facility]    07/25/20 1333 07/25/20 1333  sodium chloride flush 0.9 % injection 10 mL  PRN      Acknowledged [facility]    07/25/20 1333 07/25/20 1333  ioversol (OPTIRAY) 74 % injection 75 mL  IMG ONCE PRN      Last MAR action:  Given - by Lisandra Massed on 07/25/20 at [facility]7 [facility]    07/25/20 1330 07/25/20 1318  0.9 % sodium chloride bolus  ONCE      Last MAR action:  Stopped - by Lisandra Massed on 07/25/20 at [facility]9 [facility]    07/25/20 1301 07/25/20 1318  sodium chloride flush 0.9 % injection 10 mL  PRN      Last MAR action:  Given - by Lisandra Massed on 07/25/20 at [facility]8 [facility]    07/25/20 1301 07/25/20 1318  ioversol (OPTIRAY) 74 % injection 75 mL  IMG ONCE PRN      Last MAR action:  Given - by Lisandra Massed on 07/25/20 at 100 [facility] STEVE E    07/25/20 1200 07/25/20 1135  vancomycin (VANCOCIN) 1,500 mg in dextrose 5 % 250 mL IVPB (ADDAVIAL)  ONCE      Last MAR action:  Stop Time - by Beck Rivera on 07/25/20 at 1500 [facility]    07/25/20 1145 07/25/20 1135  piperacillin-tazobactam (ZOSYN) 4.5 g in dextrose 5 % 100 mL IVPB (mini-bag)  ONCE      Last MAR action:  Stopped - by Beck Rivera on 07/25/20 at 1238 [facility]EVE REES    07/25/20 1115 07/25/20 1100  acetaminophen (TYLENOL) tablet 1,000 mg  ONCE      Last MAR action:  [facility] - [facility] Beck Rivera [facility]          EMERGENCYDEPARTMENT COURSE:  ED Course as of Jul 27 1606

## 2020-07-25 NOTE — FLOWSHEET NOTE
responded to a Stroke Alert 11:15 am ED room B, daughter of pt is here in the LobbyLupe (pt) at CT scan,staff will call if I am needed. Chaplains remain available for spiritual or emotional support as needed. 07/25/20 1300   Encounter Summary   Services provided to: Patient; Family   Referral/Consult From: Multi-disciplinary team   Support System Children   Continue Visiting   (7/25/2020)   Complexity of Encounter Moderate   Length of Encounter 30 minutes   Crisis   Type Stroke Alert   Assessment Unable to respond   Intervention Sustaining presence/ Ministry of presence;Prayer   Outcome Comfort

## 2020-07-26 ENCOUNTER — HOSPITAL ENCOUNTER (INPATIENT)
Age: 70
LOS: 5 days | Discharge: HOME | DRG: 637 | End: 2020-07-31
Attending: EMERGENCY MEDICINE | Admitting: INTERNAL MEDICINE
Payer: OTHER GOVERNMENT

## 2020-07-26 VITALS
OXYGEN SATURATION: 96 % | HEIGHT: 59 IN | WEIGHT: 142 LBS | BODY MASS INDEX: 28.63 KG/M2 | RESPIRATION RATE: 16 BRPM | SYSTOLIC BLOOD PRESSURE: 155 MMHG | TEMPERATURE: 98.2 F | DIASTOLIC BLOOD PRESSURE: 81 MMHG | HEART RATE: 78 BPM

## 2020-07-26 PROBLEM — L03.116 CELLULITIS OF LEFT LOWER EXTREMITY: Status: ACTIVE | Noted: 2020-07-26

## 2020-07-26 PROBLEM — D32.0 MENINGIOMA, CEREBRAL (HCC): Status: ACTIVE | Noted: 2020-07-26

## 2020-07-26 PROBLEM — G93.41 METABOLIC ENCEPHALOPATHY: Status: ACTIVE | Noted: 2020-07-26

## 2020-07-26 PROBLEM — R29.90 STROKE-LIKE EPISODE: Status: ACTIVE | Noted: 2020-07-26

## 2020-07-26 LAB
ALBUMIN SERPL-MCNC: 2.5 G/DL (ref 3.5–5.2)
ALBUMIN/GLOBULIN RATIO: ABNORMAL (ref 1–2.5)
ALP BLD-CCNC: 188 U/L (ref 35–104)
ALT SERPL-CCNC: 19 U/L (ref 5–33)
ANION GAP SERPL CALCULATED.3IONS-SCNC: 13 MMOL/L (ref 9–17)
AST SERPL-CCNC: 23 U/L
BILIRUB SERPL-MCNC: 0.46 MG/DL (ref 0.3–1.2)
BUN BLDV-MCNC: 30 MG/DL (ref 8–23)
BUN/CREAT BLD: ABNORMAL (ref 9–20)
CALCIUM SERPL-MCNC: 9 MG/DL (ref 8.6–10.4)
CHLORIDE BLD-SCNC: 103 MMOL/L (ref 98–107)
CO2: 23 MMOL/L (ref 20–31)
CREAT SERPL-MCNC: 0.73 MG/DL (ref 0.5–0.9)
CULTURE: NO GROWTH
EKG ATRIAL RATE: 70 BPM
EKG P-R INTERVAL: 164 MS
EKG Q-T INTERVAL: 376 MS
EKG QRS DURATION: 88 MS
EKG QTC CALCULATION (BAZETT): 406 MS
EKG R AXIS: 23 DEGREES
EKG T AXIS: 66 DEGREES
EKG VENTRICULAR RATE: 70 BPM
GFR AFRICAN AMERICAN: >60 ML/MIN
GFR NON-AFRICAN AMERICAN: >60 ML/MIN
GFR SERPL CREATININE-BSD FRML MDRD: ABNORMAL ML/MIN/{1.73_M2}
GFR SERPL CREATININE-BSD FRML MDRD: ABNORMAL ML/MIN/{1.73_M2}
GLUCOSE BLD-MCNC: 117 MG/DL (ref 70–99)
GLUCOSE BLD-MCNC: 185 MG/DL (ref 65–105)
HCT VFR BLD CALC: 39.9 % (ref 36–46)
HEMOGLOBIN: 12.7 G/DL (ref 12–16)
INR BLD: 1
Lab: NORMAL
MAGNESIUM: 1.6 MG/DL (ref 1.6–2.6)
MCH RBC QN AUTO: 28.9 PG (ref 26–34)
MCHC RBC AUTO-ENTMCNC: 31.8 G/DL (ref 31–37)
MCV RBC AUTO: 90.8 FL (ref 80–100)
MYOGLOBIN: 137 NG/ML (ref 25–58)
NRBC AUTOMATED: ABNORMAL PER 100 WBC
PDW BLD-RTO: 17.1 % (ref 11.5–14.9)
PLATELET # BLD: 151 K/UL (ref 150–450)
PMV BLD AUTO: 9.8 FL (ref 6–12)
POTASSIUM SERPL-SCNC: 3.4 MMOL/L (ref 3.7–5.3)
PROTHROMBIN TIME: 12.9 SEC (ref 11.8–14.6)
RBC # BLD: 4.39 M/UL (ref 4–5.2)
SODIUM BLD-SCNC: 139 MMOL/L (ref 135–144)
SPECIMEN DESCRIPTION: NORMAL
TOTAL PROTEIN: 5.4 G/DL (ref 6.4–8.3)
TROPONIN INTERP: ABNORMAL
TROPONIN T: ABNORMAL NG/ML
TROPONIN, HIGH SENSITIVITY: 35 NG/L (ref 0–14)
WBC # BLD: 13.4 K/UL (ref 3.5–11)

## 2020-07-26 PROCEDURE — 6370000000 HC RX 637 (ALT 250 FOR IP): Performed by: INTERNAL MEDICINE

## 2020-07-26 PROCEDURE — 6360000002 HC RX W HCPCS: Performed by: EMERGENCY MEDICINE

## 2020-07-26 PROCEDURE — 6360000002 HC RX W HCPCS: Performed by: CLINICAL NURSE SPECIALIST

## 2020-07-26 PROCEDURE — 2580000003 HC RX 258: Performed by: EMERGENCY MEDICINE

## 2020-07-26 PROCEDURE — 6370000000 HC RX 637 (ALT 250 FOR IP): Performed by: CLINICAL NURSE SPECIALIST

## 2020-07-26 PROCEDURE — 99222 1ST HOSP IP/OBS MODERATE 55: CPT | Performed by: PSYCHIATRY & NEUROLOGY

## 2020-07-26 PROCEDURE — 2580000003 HC RX 258: Performed by: NURSE PRACTITIONER

## 2020-07-26 PROCEDURE — 80053 COMPREHEN METABOLIC PANEL: CPT

## 2020-07-26 PROCEDURE — 99222 1ST HOSP IP/OBS MODERATE 55: CPT | Performed by: CLINICAL NURSE SPECIALIST

## 2020-07-26 PROCEDURE — 82947 ASSAY GLUCOSE BLOOD QUANT: CPT

## 2020-07-26 PROCEDURE — 83874 ASSAY OF MYOGLOBIN: CPT

## 2020-07-26 PROCEDURE — 6360000002 HC RX W HCPCS: Performed by: NURSE PRACTITIONER

## 2020-07-26 PROCEDURE — 6370000000 HC RX 637 (ALT 250 FOR IP): Performed by: EMERGENCY MEDICINE

## 2020-07-26 PROCEDURE — 83735 ASSAY OF MAGNESIUM: CPT

## 2020-07-26 PROCEDURE — 36415 COLL VENOUS BLD VENIPUNCTURE: CPT

## 2020-07-26 PROCEDURE — 2580000003 HC RX 258: Performed by: CLINICAL NURSE SPECIALIST

## 2020-07-26 PROCEDURE — 1200000000 HC SEMI PRIVATE

## 2020-07-26 PROCEDURE — 84484 ASSAY OF TROPONIN QUANT: CPT

## 2020-07-26 PROCEDURE — 85610 PROTHROMBIN TIME: CPT

## 2020-07-26 PROCEDURE — 99284 EMERGENCY DEPT VISIT MOD MDM: CPT

## 2020-07-26 PROCEDURE — 2500000003 HC RX 250 WO HCPCS: Performed by: INTERNAL MEDICINE

## 2020-07-26 PROCEDURE — 85027 COMPLETE CBC AUTOMATED: CPT

## 2020-07-26 PROCEDURE — 6370000000 HC RX 637 (ALT 250 FOR IP): Performed by: NURSE PRACTITIONER

## 2020-07-26 PROCEDURE — 6360000002 HC RX W HCPCS

## 2020-07-26 RX ORDER — METOPROLOL TARTRATE 5 MG/5ML
5 INJECTION INTRAVENOUS EVERY 4 HOURS PRN
Status: DISCONTINUED | OUTPATIENT
Start: 2020-07-26 | End: 2020-07-26 | Stop reason: HOSPADM

## 2020-07-26 RX ORDER — PROMETHAZINE HYDROCHLORIDE 25 MG/1
12.5 TABLET ORAL EVERY 6 HOURS PRN
Status: DISCONTINUED | OUTPATIENT
Start: 2020-07-26 | End: 2020-07-26 | Stop reason: HOSPADM

## 2020-07-26 RX ORDER — METOPROLOL TARTRATE 50 MG/1
50 TABLET, FILM COATED ORAL 2 TIMES DAILY
Status: DISCONTINUED | OUTPATIENT
Start: 2020-07-26 | End: 2020-07-31 | Stop reason: HOSPADM

## 2020-07-26 RX ORDER — ALBUTEROL SULFATE 90 UG/1
2 AEROSOL, METERED RESPIRATORY (INHALATION) EVERY 4 HOURS PRN
Status: DISCONTINUED | OUTPATIENT
Start: 2020-07-26 | End: 2020-07-31 | Stop reason: HOSPADM

## 2020-07-26 RX ORDER — HYDRALAZINE HYDROCHLORIDE 20 MG/ML
INJECTION INTRAMUSCULAR; INTRAVENOUS
Status: COMPLETED
Start: 2020-07-26 | End: 2020-07-26

## 2020-07-26 RX ORDER — ASPIRIN 81 MG/1
81 TABLET ORAL DAILY
Status: CANCELLED | OUTPATIENT
Start: 2020-07-26

## 2020-07-26 RX ORDER — FUROSEMIDE 40 MG/1
40 TABLET ORAL 2 TIMES DAILY
Status: DISCONTINUED | OUTPATIENT
Start: 2020-07-26 | End: 2020-07-31 | Stop reason: HOSPADM

## 2020-07-26 RX ORDER — HYDRALAZINE HYDROCHLORIDE 20 MG/ML
10 INJECTION INTRAMUSCULAR; INTRAVENOUS EVERY 6 HOURS PRN
Status: DISCONTINUED | OUTPATIENT
Start: 2020-07-26 | End: 2020-07-31 | Stop reason: HOSPADM

## 2020-07-26 RX ORDER — LABETALOL 20 MG/4 ML (5 MG/ML) INTRAVENOUS SYRINGE
10 ONCE
Status: COMPLETED | OUTPATIENT
Start: 2020-07-26 | End: 2020-07-26

## 2020-07-26 RX ORDER — ALLOPURINOL 300 MG/1
300 TABLET ORAL DAILY
Status: DISCONTINUED | OUTPATIENT
Start: 2020-07-26 | End: 2020-07-31 | Stop reason: HOSPADM

## 2020-07-26 RX ORDER — ONDANSETRON 2 MG/ML
4 INJECTION INTRAMUSCULAR; INTRAVENOUS EVERY 6 HOURS PRN
Status: DISCONTINUED | OUTPATIENT
Start: 2020-07-26 | End: 2020-07-31 | Stop reason: HOSPADM

## 2020-07-26 RX ORDER — LABETALOL 20 MG/4 ML (5 MG/ML) INTRAVENOUS SYRINGE
10 EVERY 6 HOURS
Status: DISCONTINUED | OUTPATIENT
Start: 2020-07-26 | End: 2020-07-26

## 2020-07-26 RX ORDER — PROMETHAZINE HYDROCHLORIDE 25 MG/1
12.5 TABLET ORAL EVERY 6 HOURS PRN
Status: DISCONTINUED | OUTPATIENT
Start: 2020-07-26 | End: 2020-07-31 | Stop reason: HOSPADM

## 2020-07-26 RX ORDER — ATORVASTATIN CALCIUM 40 MG/1
40 TABLET, FILM COATED ORAL DAILY
Status: DISCONTINUED | OUTPATIENT
Start: 2020-07-26 | End: 2020-07-31 | Stop reason: HOSPADM

## 2020-07-26 RX ORDER — NICOTINE 21 MG/24HR
1 PATCH, TRANSDERMAL 24 HOURS TRANSDERMAL DAILY PRN
Status: DISCONTINUED | OUTPATIENT
Start: 2020-07-26 | End: 2020-07-26 | Stop reason: HOSPADM

## 2020-07-26 RX ORDER — ACETAMINOPHEN 325 MG/1
650 TABLET ORAL EVERY 6 HOURS PRN
Status: DISCONTINUED | OUTPATIENT
Start: 2020-07-26 | End: 2020-07-31 | Stop reason: HOSPADM

## 2020-07-26 RX ORDER — CLOPIDOGREL BISULFATE 75 MG/1
75 TABLET ORAL DAILY
Status: DISCONTINUED | OUTPATIENT
Start: 2020-07-26 | End: 2020-07-31 | Stop reason: HOSPADM

## 2020-07-26 RX ORDER — M-VIT,TX,IRON,MINS/CALC/FOLIC 27MG-0.4MG
1 TABLET ORAL DAILY
Status: DISCONTINUED | OUTPATIENT
Start: 2020-07-26 | End: 2020-07-31 | Stop reason: HOSPADM

## 2020-07-26 RX ORDER — ACETAMINOPHEN 650 MG/1
650 SUPPOSITORY RECTAL EVERY 6 HOURS PRN
Status: DISCONTINUED | OUTPATIENT
Start: 2020-07-26 | End: 2020-07-26 | Stop reason: HOSPADM

## 2020-07-26 RX ORDER — POLYETHYLENE GLYCOL 3350 17 G/17G
17 POWDER, FOR SOLUTION ORAL DAILY PRN
Status: DISCONTINUED | OUTPATIENT
Start: 2020-07-26 | End: 2020-07-26 | Stop reason: HOSPADM

## 2020-07-26 RX ORDER — SODIUM CHLORIDE 0.9 % (FLUSH) 0.9 %
10 SYRINGE (ML) INJECTION EVERY 12 HOURS SCHEDULED
Status: DISCONTINUED | OUTPATIENT
Start: 2020-07-26 | End: 2020-07-31 | Stop reason: HOSPADM

## 2020-07-26 RX ORDER — SODIUM CHLORIDE 0.9 % (FLUSH) 0.9 %
10 SYRINGE (ML) INJECTION PRN
Status: DISCONTINUED | OUTPATIENT
Start: 2020-07-26 | End: 2020-07-26 | Stop reason: HOSPADM

## 2020-07-26 RX ORDER — ACETAMINOPHEN 325 MG/1
650 TABLET ORAL EVERY 6 HOURS PRN
Status: DISCONTINUED | OUTPATIENT
Start: 2020-07-26 | End: 2020-07-26 | Stop reason: HOSPADM

## 2020-07-26 RX ORDER — ASPIRIN 300 MG/1
300 SUPPOSITORY RECTAL DAILY
Status: DISCONTINUED | OUTPATIENT
Start: 2020-07-26 | End: 2020-07-30

## 2020-07-26 RX ORDER — SODIUM CHLORIDE 0.9 % (FLUSH) 0.9 %
10 SYRINGE (ML) INJECTION PRN
Status: DISCONTINUED | OUTPATIENT
Start: 2020-07-26 | End: 2020-07-31 | Stop reason: HOSPADM

## 2020-07-26 RX ORDER — LABETALOL 20 MG/4 ML (5 MG/ML) INTRAVENOUS SYRINGE
10 EVERY 6 HOURS PRN
Status: DISCONTINUED | OUTPATIENT
Start: 2020-07-26 | End: 2020-07-31 | Stop reason: HOSPADM

## 2020-07-26 RX ORDER — SODIUM CHLORIDE 9 MG/ML
INJECTION, SOLUTION INTRAVENOUS CONTINUOUS
Status: DISCONTINUED | OUTPATIENT
Start: 2020-07-26 | End: 2020-07-26 | Stop reason: HOSPADM

## 2020-07-26 RX ORDER — SODIUM CHLORIDE 0.9 % (FLUSH) 0.9 %
10 SYRINGE (ML) INJECTION EVERY 12 HOURS SCHEDULED
Status: DISCONTINUED | OUTPATIENT
Start: 2020-07-26 | End: 2020-07-26 | Stop reason: HOSPADM

## 2020-07-26 RX ORDER — CETIRIZINE HYDROCHLORIDE 10 MG/1
10 TABLET ORAL DAILY
Status: DISCONTINUED | OUTPATIENT
Start: 2020-07-26 | End: 2020-07-31 | Stop reason: HOSPADM

## 2020-07-26 RX ORDER — ONDANSETRON 2 MG/ML
4 INJECTION INTRAMUSCULAR; INTRAVENOUS EVERY 6 HOURS PRN
Status: DISCONTINUED | OUTPATIENT
Start: 2020-07-26 | End: 2020-07-26 | Stop reason: HOSPADM

## 2020-07-26 RX ORDER — ASPIRIN 81 MG/1
81 TABLET ORAL DAILY
Status: DISCONTINUED | OUTPATIENT
Start: 2020-07-26 | End: 2020-07-30

## 2020-07-26 RX ADMIN — ACETAMINOPHEN 650 MG: 325 TABLET, FILM COATED ORAL at 09:39

## 2020-07-26 RX ADMIN — ACETAMINOPHEN 650 MG: 325 TABLET, FILM COATED ORAL at 02:55

## 2020-07-26 RX ADMIN — ENOXAPARIN SODIUM 40 MG: 40 INJECTION SUBCUTANEOUS at 08:48

## 2020-07-26 RX ADMIN — Medication 10 MG: at 16:50

## 2020-07-26 RX ADMIN — CETIRIZINE HYDROCHLORIDE 10 MG: 10 TABLET ORAL at 17:16

## 2020-07-26 RX ADMIN — HYDRALAZINE HYDROCHLORIDE 10 MG: 20 INJECTION INTRAMUSCULAR; INTRAVENOUS at 18:18

## 2020-07-26 RX ADMIN — METOPROLOL TARTRATE 50 MG: 50 TABLET, FILM COATED ORAL at 01:06

## 2020-07-26 RX ADMIN — ALLOPURINOL 300 MG: 300 TABLET ORAL at 17:17

## 2020-07-26 RX ADMIN — METOPROLOL TARTRATE 50 MG: 50 TABLET, FILM COATED ORAL at 21:27

## 2020-07-26 RX ADMIN — SODIUM CHLORIDE: 9 INJECTION, SOLUTION INTRAVENOUS at 03:02

## 2020-07-26 RX ADMIN — FUROSEMIDE 40 MG: 40 TABLET ORAL at 21:27

## 2020-07-26 RX ADMIN — PIPERACILLIN AND TAZOBACTAM 4.5 G: 4; .5 INJECTION, POWDER, LYOPHILIZED, FOR SOLUTION INTRAVENOUS; PARENTERAL at 08:47

## 2020-07-26 RX ADMIN — VANCOMYCIN HYDROCHLORIDE 1000 MG: 1 INJECTION, POWDER, LYOPHILIZED, FOR SOLUTION INTRAVENOUS at 09:39

## 2020-07-26 RX ADMIN — MULTIPLE VITAMINS W/ MINERALS TAB 1 TABLET: TAB at 17:16

## 2020-07-26 RX ADMIN — ENOXAPARIN SODIUM 40 MG: 40 INJECTION SUBCUTANEOUS at 17:24

## 2020-07-26 RX ADMIN — DESMOPRESSIN ACETATE 40 MG: 0.2 TABLET ORAL at 17:16

## 2020-07-26 RX ADMIN — INSULIN LISPRO 1 UNITS: 100 INJECTION, SOLUTION INTRAVENOUS; SUBCUTANEOUS at 21:42

## 2020-07-26 RX ADMIN — ACETAMINOPHEN 650 MG: 325 TABLET ORAL at 17:15

## 2020-07-26 RX ADMIN — MEROPENEM 1 G: 1 INJECTION, POWDER, FOR SOLUTION INTRAVENOUS at 17:34

## 2020-07-26 RX ADMIN — Medication 81 MG: at 17:16

## 2020-07-26 RX ADMIN — LABETALOL 20 MG/4 ML (5 MG/ML) INTRAVENOUS SYRINGE 10 MG: at 17:25

## 2020-07-26 RX ADMIN — CLOPIDOGREL 75 MG: 75 TABLET, FILM COATED ORAL at 17:16

## 2020-07-26 ASSESSMENT — PAIN SCALES - GENERAL
PAINLEVEL_OUTOF10: 5
PAINLEVEL_OUTOF10: 2

## 2020-07-26 ASSESSMENT — ENCOUNTER SYMPTOMS
BLOOD IN STOOL: 0
ABDOMINAL PAIN: 0
NAUSEA: 0
SHORTNESS OF BREATH: 0
CONSTIPATION: 0
TROUBLE SWALLOWING: 0
DIARRHEA: 0
VOMITING: 0
CHEST TIGHTNESS: 0
SORE THROAT: 0
COUGH: 0
PHOTOPHOBIA: 0

## 2020-07-26 NOTE — CONSULTS
[facility] Neurology   [facility]  Neurology Consult Note      Reason for Consult:  Meningioma, stroke eval  Requesting Physician:  Linda Ramachandran MD    CHIEF COMPLAINT:  AMS, weakness, fever    History Obtained From:  patient, family member - daughter, EMR       HISTORY OF PRESENT ILLNESS:              The patient is a 78 y.o. female with significant past medical history of HTN, CHF, decreased hearing, DM2 who presents with AMS, weakness and to to have Fever, UTI, Cellulitis, right middle cranial fossa meningioma     As per daughter patient presented with 2 days of altered mental status, saying things incorrectly  And being confused. Patient also has bilateral knee problems and having some pain and weakness bilateral lower extremity. Patient was taken to [facility] ED where they found that the patient was having temperature of 39.7, positive for UTI and cellulitis. On CT head without contrast they noticed some old right basal ganglia infarct. CT head and neck showed 60% stenosis of the origin of the right internal carotid artery, 30% focal stenosis at the origin of the left internal carotid artery, 50% stenosis of the distal P2 segment of the left PCA. He also noticed some 2 cm extra-axial enhancing mass on the posterior aspect of the right middle cranial fossa that could be a meningioma. Brain MRI with and without revealed the right middle cranial fossa meningioma.   [facility] be evaluated by neurosurgery due to concern of meningioma versus abscess and neurology for altered mental status      Past Medical History:        Diagnosis Date    Arthritis     Cellulitis and abscess     Cellulitis and abscess of unspecified site     CHF (congestive heart failure) (HCC)     Chronic kidney disease, unspecified     Coronary atherosclerosis of native coronary artery     Essential hypertension, malignant     Hard of hearing  Iron deficiency anemia, unspecified     Myocardial infarction ([facility] [address]     Other and unspecified hyperlipidemia     Pure hypercholesterolemia     Type II or unspecified type diabetes mellitus without mention of complication, not stated as uncontrolled     Unspecified asthma(493.90)     Unspecified venous (peripheral) insufficiency     Unspecified vitamin D deficiency      Past Surgical History:        Procedure Laterality Date    CORONARY ARTERY BYPASS GRAFT      x 3, Dr. Renee Garcia       Current Medications:   No current facility-administered medications for this encounter. Allergies:  Aleve [naproxen sodium]; Pioglitazone; Claritin [loratadine]; Keflex [cephalexin]; and Lisinopril    Social History:  TOBACCO:   reports that she quit smoking about 20 years ago. She has a 2.50 pack-year smoking history. She has never used smokeless tobacco.  ETOH:   reports current alcohol use. DRUGS:   reports no history of drug use. ACTIVITIES OF DAILY LIVING:  Patient is able to perform all activities of daily living. INSTRUMENTAL ACTIVITIES OF DAILY LIVING:  Patient is able to perform all instrumental activities of daily living.     Family History:       Problem Relation Age of Onset    Diabetes Mother     Heart Disease Mother     Heart Disease Father     Diabetes Sister     High Blood Pressure Sister     Diabetes Maternal Grandmother        REVIEW OF SYSTEMS:  CONSTITUTIONAL:  Positive for malaise, fever   EYES: negative for double vision and photophobia    HEENT: negative for tinnitus and sore throat   RESPIRATORY: negative for cough, shortness of breath   CARDIOVASCULAR: negative for chest pain, palpitations, or syncope   GASTROINTESTINAL: negative for abdominal pain, nausea, vomiting, diarrhea, or constipation    GENITOURINARY: negative for incontinence or retention    MUSCULOSKELETAL: negative for neck or back pain, negative for extremity pain   NEUROLOGICAL: Negative for seizures, headaches, numbness, aphasia, dysarthria  Positive for confusion, weakness   PSYCHIATRIC: negative for agitation, hallucination, SI/HI   SKIN Negative for spontaneous contusions  Positive for cellulitis     PHYSICAL EXAM:    Vitals:  BP (!) 189/61   Pulse 82   Temp 98.6 °F (37 °C) (Oral)   Resp 18   Ht 4' 11\" (1.499 m)   Wt 142 lb (64.4 kg)   SpO2 97%   BMI 28.68 kg/m²      General Appearance: Appropriate    GENERAL  Appears comfortable and in no distress   HEENT  NC/ AT   HEART  S1 and S2 heard; palpation of pulses: radial pulse    NECK  Supple and no bruits heard   MENTAL STATUS:  Alert, oriented, intact memory, no confusion, normal speech, normal language, no hallucination or delusion   CRANIAL NERVES: II     -      Visual fields intact to confrontation  III,IV,VI -  PERR, EOMs full, no ptosis  V     -     Normal facial sensation   VII    -     Normal facial symmetry  VIII   -     Intact hearing   IX,X -     Symmetrical palate  XI    -     Symmetrical shoulder shrug  XII   -     Midline tongue, no atrophy    MOTOR FUNCTION: RUE: Significant for good strength of grade 5/5 in proximal and distal muscle groups   LUE: Significant for good strength of grade 5/5 in proximal and distal muscle groups   RLE: Significant for good strength of grade 4+/5 in proximal and distal muscle groups   LLE: Significant for good strength of grade 4+/5 in proximal and distal muscle groups      Normal bulk, normal tone and no involuntary movements, no tremor   SENSORY FUNCTION:  Normal touch, normal pin prick   CEREBELLAR FUNCTION:  Intact fine motor control over upper limbs   REFLEX FUNCTION:  Symmetric in upper and lower extremities, no Babinski sign   STATION and GAIT  Deferred     DATA  Recent Results (from the past 24 hour(s))   COVID-19    Collection Time: 07/25/20 12:17 PM    Specimen: Other   Result Value Ref Range    SARS-CoV-2          SARS-CoV-2, Rapid Not Detected Not Detected    Source . NASOPHARYNGEAL SWAB Value Ref Range    Ventricular Rate 70 BPM    Atrial Rate 70 BPM    P-R Interval 164 ms    QRS Duration 88 ms    Q-T Interval 376 ms    QTc Calculation (Bazett) 406 ms    R Axis 23 degrees    T Axis 66 degrees       IMAGING    1. CT head without contrast  Impression    No acute intracranial abnormality.         Remote appearing lacunar infarction right basal ganglia.           2. CTA head and neck  Impression    60% stenosis at the origin of the right internal carotid artery         30% focal stenosis at the origin of the left internal carotid artery.         50% focal stenosis in the distal P2 segment of the left PCA.         2 cm extra-axial enhancing mass along the anterior aspect of the right middle    cranial fossa, likely related to a meningioma.  Further evaluation with MRI    brain with and without contrast is recommended.         Old infarction in the right head of caudate nucleus.           3. Brain MRI with and without contrast  Impression    Probable meningioma right middle cranial fossa.  Otherwise no acute disease.             IMPRESSION     Case of a 79-year-old female patient admitted to [facility] due to right-sided meningioma, UTI, cellulitis and altered mental status    //Altered mental status//   Patient with some altered mental status since 2 days unable to tell date, objects. Patient found with UTI, cellulitis, fever and getting better after started with antibiotics. Altered mental status most likely metabolic in nature. We will get EEG and have internal medicine to continue treatment. Will get evaluation by PT and OT due to patient's bilateral lower extremity pain and weakness due to knee problems    //Meningioma//   Patient with meningioma seen on right middle cranial fossa measuring 1.8 cm x 2.8 cm. Neurosurgery on the case. Less likely abscess    RECOMMENDATIONS:   1. I recommend patient to have routine EEG  2. I recommend the patient have PT and OT evaluation  3.  Recommend

## 2020-07-26 NOTE — ED NOTES
Daughter updated that patient will be here overnight due to no bed at [facility].      Zaki Carias RN  07/26/20 2290

## 2020-07-26 NOTE — ED NOTES
Patient  will be boarded in ER and offered her a hospital bed and patient refused     Barbara Mckenzie, SHERITA  07/26/20 [id number]

## 2020-07-26 NOTE — H&P
[facility]    HISTORY AND PHYSICAL EXAMINATION            Date:   7/26/2020  Patient name:  Fanta Knapp  Date of admission:  7/26/2020 10:23 AM  MRN:   [id number]  Account:  [de-identified]  YOB: 1950  PCP:    Angelina Leblanc MD  Room:   14/14  Code Status:    Prior    Chief Complaint:     Chief Complaint   Patient presents with    Headache     Pt is a transfer from [facility] for neurology consult, has head mass       History Obtained From:     patient, electronic medical record    History of Present Illness:     Fanta Knapp is a 78 y.o. Non-/non  female who presents with Headache (Pt is a transfer from [facility] for neurology consult, has head mass)  She was transferred from [facility] ED to [facility] ED July 26 for the management of Stroke-like episode [facility])  Symptoms are resolved today. Pt was admitted as a direct admit last evening but there are no beds available hence the ED to ED transfer. The pt presented with weakness (unable to stand),  H/A and confusion. She had nonsensical talk on arrival, NIHSS 7. Her last normal was 7 pm July 24 per daughter. Pt fell 2 weeks ago and hit her head but did not seek treatment. She is on Plavix and ASA. CT head showed old lacunar infarct. CTA head/neck showed 48% CHAD, 01% LICA & 11% distal P2 left PCA. MRI brain showed a probable meningioma in the rt middle cranial fossa. Dr Jack Collins was concerned for possible cerebral abscess. She received Vanco and Zosyn for possible infectious process.       Pertinent Diagnostics on Admit:  WBC 17.2, hgb 13.7, Platelets 541, glucose 157, BUN/Creat 37/0.9, K 3.1, Mg 1.6, , AST 27, ALT 27, Fibrinogen 610,  , Ferritin 155, Dimer 0.71,  Troponin 28 - 31 - 35, Lactic 1.8    UA:  10-20 WBC,  Few bacteria, Mod hgb, 4 + protein, trace glucose, neg nitrate/leuko est    Blood and Urine cultures sent 7/25.    CXR:  Mild pulm congestion    CTA Chest:  No PE, no acute process    Past Medical History:     Past Medical History:   Diagnosis Date    Arthritis     Cellulitis and abscess     Cellulitis and abscess of unspecified site     CHF (congestive heart failure) (HCC)     Chronic kidney disease, unspecified     Coronary atherosclerosis of native coronary artery     Essential hypertension, malignant     Hard of hearing     Iron deficiency anemia, unspecified     Myocardial infarction ([address]     Other and unspecified hyperlipidemia     Pure hypercholesterolemia     Type II or unspecified type diabetes mellitus without mention of complication, not stated as uncontrolled     Unspecified asthma(493.90)     Unspecified venous (peripheral) insufficiency     Unspecified vitamin D deficiency         Past Surgical History:     Past Surgical History:   Procedure Laterality Date    CORONARY ARTERY BYPASS GRAFT      x 3, Dr. Dariana Bautista          Medications Prior to Admission:     Prior to Admission medications    Medication Sig Start Date End Date Taking?  Authorizing Provider   desloratadine (CLARINEX) 5 MG tablet Take 1 tablet by mouth daily Patient needs to contact office before any further refills will be approved 6/29/20   Ko Chung MD   furosemide (LASIX) 40 MG tablet TAKE ONE TABLET BY MOUTH TWICE A DAY 6/8/20   Ko Chung MD   metFORMIN (GLUCOPHAGE) 500 MG tablet Take 1 tablet by mouth 2 times daily (with meals) 3/3/20   Ko Chung MD   metoprolol tartrate (LOPRESSOR) 50 MG tablet Take 1 tablet by mouth 2 times daily 3/3/20   Ko Chung MD   Multiple Vitamins-Minerals (THERAPEUTIC MULTIVITAMIN-MINERALS) tablet Take 1 tablet by mouth daily    Historical Provider, MD   atorvastatin (LIPITOR) 40 MG tablet Take 1 tablet by mouth daily ** stop simvastatin& fenofibrate ** 2/20/20   Ko Chung MD   mineral oil-hydrophilic petrolatum (HYDROPHOR) ointment Apply topically twice a day as needed for dry skin on the hands and forearms 2/20/20   Jacinto Howe MD   allopurinol (ZYLOPRIM) 300 MG tablet Take 1 tablet by mouth daily Per rheumatologist 2/20/20   Jacinto Howe MD   diclofenac sodium 1 % GEL APPLY 4 GRAMS TOPICALLY THREE TIMES DAILY FOR 30 DAYS 12/19/19   Historical Provider, MD   albuterol sulfate HFA (PROVENTIL HFA) 108 (90 Base) MCG/ACT inhaler Inhale 1-2 puffs into the lungs every 4 hours as needed for Wheezing 1/7/20   Heather Youssef MD   Blood Pressure KIT 1 kit by Does not apply route three times daily 12/19/19   Candance Chol, MD   FREESTYLE LANCETS MISC 1 each by Does not apply route daily Patient needs to contact office before any further refills will be approved 12/18/19   Candance Chol, MD   blood glucose test strips (FREESTYLE LITE) strip 1 each by In Vitro route daily As needed. 12/18/19   Candance Chol, MD   Blood Pressure KIT 1 each by Does not apply route daily 12/18/19   Candance Chol, MD   clobetasol (TEMOVATE) 0.05 % ointment Apply topically 2 times daily. For 7days 12/18/19   Candance Chol, MD   blood glucose monitor kit and supplies 1 kit by Other route three times daily Dispense Butterfly Elite CBG Device 8/20/19   Jonas Dwyer MD   clopidogrel (PLAVIX) 75 MG tablet Take 1 tablet by mouth daily 6/18/19   Ruth Valadez MD   aspirin EC 81 MG EC tablet Take 1 tablet by mouth daily. 11/24/14   Jonas Dwyer MD   ammonium lactate (AMLACTIN) 12 % cream Apply 2 g topically as needed for Dry Skin. Apply topically as needed. Historical Provider, MD        Allergies:     Aleve [naproxen sodium]; Pioglitazone; Claritin [loratadine]; Keflex [cephalexin]; and Lisinopril    Social History:     Tobacco:    reports that she quit smoking about 20 years ago. She has a 2.50 pack-year smoking history. She has never used smokeless tobacco.  Alcohol:      reports current alcohol use.   Drug Use: reports no history of drug use. Family History:     Family History   Problem Relation Age of Onset    Diabetes Mother     Heart Disease Mother     Heart Disease Father     Diabetes Sister     High Blood Pressure Sister     Diabetes Maternal Grandmother        Review of Systems:     Positive and Negative as described in HPI. Review of Systems   Constitutional: Positive for activity change and fatigue. HENT: Positive for hearing loss. Negative for sore throat and trouble swallowing. Eyes: Negative for photophobia and visual disturbance. Respiratory: Negative for cough and shortness of breath. Cardiovascular: Positive for leg swelling (mild chronic lower legs). Negative for chest pain. Gastrointestinal: Negative for abdominal pain, blood in stool, constipation, diarrhea, nausea and vomiting. Genitourinary: Negative for dysuria and hematuria. Musculoskeletal: Positive for arthralgias (knees ). Negative for neck pain. Skin: Positive for rash (lower left leg) and wound. Neurological: Positive for weakness (generalized, unable to get up yesterday) and headaches (yesterday). Negative for seizures, syncope, facial asymmetry, light-headedness and numbness. Psychiatric/Behavioral: Positive for confusion (yesterday, pt unaware of this today but daughter confirms). All other systems reviewed and are negative. Physical Exam:   BP (!) 183/74   Pulse 82   Temp 98.6 °F (37 °C) (Oral)   Resp 18   Ht 4' 11\" (1.499 m)   Wt 142 lb (64.4 kg)   SpO2 98%   BMI 28.68 kg/m²   Temp (24hrs), Av.6 °F (37 °C), Min:97.6 °F (36.4 °C), Max:100.2 °F (37.9 °C)    Recent Labs     20  1057   POCGLU 148*     No intake or output data in the 24 hours ending 20 1411    Physical Exam  Constitutional:       Appearance: She is ill-appearing (frail, chronically ill appearance). HENT:      Head: Normocephalic.       Ears:      Comments: [place]     Nose: Nose normal.      Mouth/Throat:      Mouth: AM   Result Value Ref Range    Glucose 117 (H) 70 - 99 mg/dL    BUN 30 (H) 8 - 23 mg/dL    CREATININE 0.73 0.50 - 0.90 mg/dL    Bun/Cre Ratio NOT REPORTED 9 - 20    Calcium 9.0 8.6 - 10.4 mg/dL    Sodium 139 135 - 144 mmol/L    Potassium 3.4 (L) 3.7 - 5.3 mmol/L    Chloride 103 98 - 107 mmol/L    CO2 23 20 - 31 mmol/L    Anion Gap 13 9 - 17 mmol/L    Alkaline Phosphatase 188 (H) 35 - 104 U/L    ALT 19 5 - 33 U/L    AST 23 <32 U/L    Total Bilirubin 0.46 0.3 - 1.2 mg/dL    Total Protein 5.4 (L) 6.4 - 8.3 g/dL    Alb 2.5 (L) 3.5 - 5.2 g/dL    Albumin/Globulin Ratio NOT REPORTED 1.0 - 2.5    GFR Non-African American >60 >60 mL/min    GFR African American >60 >60 mL/min    GFR Comment          GFR Staging NOT REPORTED    CBC    Collection Time: 07/26/20  5:30 AM   Result Value Ref Range    WBC 13.4 (H) 3.5 - 11.0 k/uL    RBC 4.39 4.0 - 5.2 m/uL    Hemoglobin 12.7 12.0 - 16.0 g/dL    Hematocrit 39.9 36 - 46 %    MCV 90.8 80 - 100 fL    MCH 28.9 26 - 34 pg    MCHC 31.8 31 - 37 g/dL    RDW 17.1 (H) 11.5 - 14.9 %    Platelets 541 193 - 238 k/uL    MPV 9.8 6.0 - 12.0 fL    NRBC Automated NOT REPORTED per 100 WBC   PROTIME-INR    Collection Time: 07/26/20  5:30 AM   Result Value Ref Range    Protime 12.9 11.8 - 14.6 sec    INR 1.0    Magnesium    Collection Time: 07/26/20  5:30 AM   Result Value Ref Range    Magnesium 1.6 1.6 - 2.6 mg/dL   EKG 12 Lead    Collection Time: 07/26/20  7:49 AM   Result Value Ref Range    Ventricular Rate 70 BPM    Atrial Rate 70 BPM    P-R Interval 164 ms    QRS Duration 88 ms    Q-T Interval 376 ms    QTc Calculation (Bazett) 406 ms    R Axis 23 degrees    T Axis 66 degrees       Imaging/Diagnostics:    Ct Head Wo Contrast  Result Date: 7/25/2020  No acute intracranial abnormality. Remote appearing lacunar infarction right basal ganglia. Xr Chest Portable  Result Date: 7/25/2020  Mild pulmonary vascular congestion.  Stable rounded opacity projected in the region the right hilum is of uncertain etiology but could represent right pulmonary artery en face. CT chest could be obtained for confirmation. Ct Chest Pulmonary Embolism W Contrast  Result Date: 7/25/2020  1. No evidence of pulmonary embolism or acute pulmonary abnormality. No mass lesion. 2. Dilatation of the main pulmonary artery to 3.6 cm which can be seen in pulmonary arterial hypertension. 3. Partially imaged splenomegaly of uncertain etiology. Cta Head Neck W Contrast  Result Date: 7/25/2020  60% stenosis at the origin of the right internal carotid artery 30% focal stenosis at the origin of the left internal carotid artery. 50% focal stenosis in the distal P2 segment of the left PCA. 2 cm extra-axial enhancing mass along the anterior aspect of the right middle cranial fossa, likely related to a meningioma. Further evaluation with MRI brain with and without contrast is recommended. Old infarction in the right head of caudate nucleus. Mri Brain W Wo Contrast  Result Date: 7/25/2020  Probable meningioma right middle cranial fossa. Otherwise no acute disease. Assessment :      Hospital Problems           Last Modified POA    * (Principal) Stroke-like episode ([address] 7/26/2020 Yes    Venous (peripheral) insufficiency 7/26/2020 Yes    Type 2 diabetes mellitus with stage 3 chronic kidney disease, without long-term current use of insulin ([address] 7/26/2020 Yes    Coronary artery disease involving native coronary artery of native heart without angina pectoris 7/26/2020 Yes    Stage 3 chronic kidney disease ([address] 7/26/2020 Yes    Bilateral hearing loss 7/26/2020 Yes    Chronic diastolic CHF (congestive heart failure) ([address] 7/26/2020 Yes    Chronic obstructive pulmonary disease ([address] 7/26/2020 Yes    Gout with tophi 7/26/2020 Yes    Essential hypertension (Chronic) 7/26/2020 Yes    Cellulitis of left lower extremity 7/26/2020 Yes          Plan:     Patient status inpatient in the Med[facility]Surge    1. Consult Neurology  2.  Consult Neuro

## 2020-07-26 NOTE — ED PROVIDER NOTES
know patient is here.       Critical Care     none    Dio Huddleston MD, [place] Fragmin  Attending Emergency  Physician             Dio Huddleston MD  07/26/20 1443

## 2020-07-26 NOTE — CONSULTS
[address]    Department of Neurosurgery  Resident Consult Note      Reason for Consult:  Meningioma  Requesting Physician:  Dr. Josee Thacker:   []Dr. Kacey Garcia  []Dr. Anuradha Wolfe  []Dr. Sabine Siegel  []Dr. Jeanine Schwab  [x]Dr. Mila Brewster  []Dr. Bisi Carbajal    History Obtained From:  patient, electronic medical record    CHIEF COMPLAINT:         Slurred speech    HISTORY OF PRESENT ILLNESS:       The patient is a 78 y.o. female who presents with slurred speech and expressive aphasia starting x2 days ago per her daughter. She apparently fell a week ago without LOC. X2 days ago she started having difficulty speaking with slurring of her words and then per daughter she had a word salad with sentences not making sense. Presented to [facility] ED where she was found to have a mass measuring 2 cm in the Anterior R middle cranial fossa, concerning for a meningioma. Patient was transferred to [facility] for further evaluation. Patient denies fevers, weakness, numbness or tingling.      GCS 15  AOx4  Neurointact    PAST MEDICAL HISTORY :       Past Medical History:        Diagnosis Date    Arthritis     Cellulitis and abscess     Cellulitis and abscess of unspecified site     [facility]F [facility]estive heart failure) [facility]     Chronic kidney disease, unspecified     Coronary atherosclerosis of native coronary artery     Essential hypertension, malignant     Hard of hearing     Iron deficiency anemia, unspecified     Myocardial infarction ([address]     Other and unspecified hyperlipidemia     Pure hypercholesterolemia     Type II or unspecified type diabetes mellitus without mention of complication, not stated as uncontrolled     Unspecified asthma(493.90)     Unspecified venous (peripheral) insufficiency     Unspecified vitamin D deficiency        Past Surgical History:        Procedure Laterality Date    CORONARY ARTERY BYPASS GRAFT      x 3, Dr. [place] History:   Social History     Socioeconomic History    Marital status:      Spouse name: Not on file    Number of children: Not on file    Years of education: Not on file    Highest education level: Not on file   Occupational History    Not on file   Social Needs    Financial resource strain: Not on file    Food insecurity     Worry: Not on file     Inability: Not on file    Transportation needs     Medical: Not on file     Non-medical: Not on file   Tobacco Use    Smoking status: Former Smoker     Packs/day: 0.25     Years: 10.00     Pack years: 2.50     Last attempt to quit: 2000     Years since quittin.5    Smokeless tobacco: Never Used   Substance and Sexual Activity    Alcohol use: Yes     Alcohol/week: 0.0 standard drinks    Drug use: No    Sexual activity: Not on file   Lifestyle    Physical activity     Days per week: Not on file     Minutes per session: Not on file    Stress: Not on file   Relationships    Social connections     Talks on phone: Not on file     Gets together: Not on file     Attends Orthodoxy service: Not on file     Active member of club or organization: Not on file     Attends meetings of clubs or organizations: Not on file     Relationship status: Not on file    Intimate partner violence     Fear of current or ex partner: Not on file     Emotionally abused: Not on file     Physically abused: Not on file     Forced sexual activity: Not on file   Other Topics Concern    Not on file   Social History Narrative    Not on file       Family History:       Problem Relation Age of Onset    Diabetes Mother     Heart Disease Mother     Heart Disease Father     Diabetes Sister     High Blood Pressure Sister     Diabetes Maternal Grandmother        Allergies:   Aleve [naproxen sodium]; Pioglitazone; Claritin [loratadine]; Keflex [cephalexin]; and Lisinopril    Home Medications:  Prior to Admission medications    Medication Sig Start Date End Date Taking?  Authorizing Provider blood glucose monitor kit and supplies 1 kit by Other route three times daily Dispense Butterfly Elite CBG Device 8/20/19   Andrés Mcgee MD   clopidogrel (PLAVIX) 75 MG tablet Take 1 tablet by mouth daily 6/18/19   Juventino Noriega MD   aspirin EC 81 MG EC tablet Take 1 tablet by mouth daily. 11/24/14   Andrés Mcgee MD   ammonium lactate (AMLACTIN) 12 % cream Apply 2 g topically as needed for Dry Skin. Apply topically as needed. Historical Provider, MD       Current Medications:   No current facility-administered medications for this encounter. REVIEW OF SYSTEMS:       General ROS - No fevers, no chills  Ophthalmic ROS - No changes in vision from baseline  ENT ROS -  No sore throat, no rhinorrhea  Respiratory ROS - no cough, no shortness of breath  Cardiovascular ROS - No chest pain  Gastrointestinal ROS - No abdominal pain, no nausea, no vomiting  Genito-Urinary ROS - No dysuria  Musculoskeletal ROS - No neck pain, no back pain  Neurological ROS - No focal weakness or loss of sensation, no headache  Dermatological ROS - No lesions, no rash  Vascular/Lymphatic ROS - No edema    PHYSICAL EXAM:       Vitals:    07/26/20 1412   BP:    Pulse: 92   Resp:    Temp:    SpO2: 98%       CONSTITUTIONAL:  Well developed, well nourished, alert and oriented x 3, in no acute distress, nontoxic. No dysarthria, no aphasia. EOMI.     HEAD:  normocephalic, atraumatic    EYES:  PERRLA, EOMI   ENT:  moist mucous membranes, no stridor, no tracheal deviation   NECK:  no midline tenderness to palpation, no step-offs or deformities   BACK:  no midline tenderness to palpation, no step-offs or deformities    LUNGS:  CTAB, equal air entry bilaterally, no wheezes or rales,   Pattern of breathing: regular    CARDIOVASCULAR:  RRR, no murmur   ABDOMEN:  Soft, no rigidity   NEUROLOGIC:  EYE OPENING     Spontaneous - 4 [x]       To voice - 3 []       To pain - 2 []       None - 1 []    VERBAL RESPONSE     Appropriate, oriented 2.5 07/26/2020    CREATININE 0.73 07/26/2020    CALCIUM 9.0 07/26/2020    GFRAA >60 07/26/2020    LABGLOM >60 07/26/2020    GLUCOSE 117 07/26/2020       Radiology Review:      Ct Head Wo Contrast    Result Date: 7/25/2020  No acute intracranial abnormality. Remote appearing lacunar infarction right basal ganglia. Cta Head Neck W Contrast    Result Date: 7/25/2020  60% stenosis at the origin of the right internal carotid artery 30% focal stenosis at the origin of the left internal carotid artery. 50% focal stenosis in the distal P2 segment of the left PCA. 2 cm extra-axial enhancing mass along the anterior aspect of the right middle cranial fossa, likely related to a meningioma. Further evaluation with MRI brain with and without contrast is recommended. Old infarction in the right head of caudate nucleus. Mri Brain W Wo Contrast    Result Date: 7/25/2020  Probable meningioma right middle cranial fossa. Otherwise no acute disease. ASSESSMENT AND PLAN:       Patient Active Problem List   Diagnosis    Vitamin D deficiency    Venous (peripheral) insufficiency    Type 2 diabetes mellitus with stage 3 chronic kidney disease, without long-term current use of insulin ([address]    Coronary artery disease involving native coronary artery of native heart without angina pectoris    Iron deficiency anemia    Stage 3 chronic kidney disease ([address]    Colonoscopy refused    Pneumococcal vaccine refused    Bilateral hearing loss    Chronic diastolic CHF (congestive heart failure) ([address]    Chronic obstructive pulmonary disease ([address]    HTN.  Benign hypertension with CKD (chronic kidney disease) stage III (HCC)    Gout with tophi    Hyperlipidemia with target LDL less than 70    Dry skin dermatitis HANDS    Acute CVA (cerebrovascular accident) ([address]    Essential hypertension    Stroke-like episode ([address]    Cellulitis of left lower extremity    Brain mass       A/P:  Sheryl Bruner is a 78 y.o. female who presents with dysarthria and expressive aphasia with a 2 cm enhancing mass on MRI Brain concerning for a meningioma. Patient care will be discussed with attending, will reevaluate patient along with attending     - No neurosurgical interventions planned for now   - Neuro checks per protocol   - reassess in AM      Additional recommendations may follow    Please contact neurosurgery with any changes in patient's neurologic status. Thank you for your consult. Shereen Pulliam MD, [facility]  Neurosurgery/Neuro Critical Care Service  Pager [phone]  7/26/2020 2:43 PM      Please note that this chart was generated using voice recognition Dragon dictation software. Although every effort was made to ensure the accuracy of this automated transcription, some errors in transcription may have occurred.

## 2020-07-26 NOTE — ED NOTES
Report given to [facility].  Pt ready to go to [facility]  07/26/20 8641

## 2020-07-26 NOTE — PROGRESS NOTES
Physical Therapy  DATE: 2020    NAME: Alaina Miller  MRN: [id number]   : 1950    Patient not seen this date for Physical Therapy due to:  [] Blood transfusion in progress  [] Hemodialysis  []  Patient Declined  [] Spine Precautions   [] Strict Bedrest  [] Surgery/ Procedure  [] Testing      [x] Other Pt awaiting transport to [place] [address] for neurosurgery consult. Defer PT at this time. [] PT being discontinued at this time. Patient independent. No further needs. [] PT being discontinued at this time as the patient has been transferred to palliative care. No further needs.     Marleny Thomas, PT

## 2020-07-26 NOTE — ED PROVIDER NOTES
[address]  ED  Emergency Department Encounter  EmergencyMedicine Resident     Pt Tatyana Garcia  MRN: [id number]  [place] 1950  Date of evaluation: 7/26/20  PCP:  Randi Souza MD    [address] [place] [address]       Chief Complaint   Patient presents with    Headache     Pt is a transfer from [facility] for neurology consult, has head mass       HISTORY OF PRESENT ILLNESS  (Location/Symptom, Timing/Onset, Context/Setting, Quality, Duration, Modifying Factors, Severity.)      Sandi Price is a 78 y.o. female who presents with being unable to stand from sitting yesterday and recent worsening headaches and sleepiness. She was transferred from [facility] today as she was boarding in the ED and needed a neurosurgery consult, as they found a right-sided intracranial mass in the work-up at [facility]. This patient had a direct admission to [facility] in our hospital and needs to be seen by neurosurgery for her intracranial mass. When conversing with patient this morning, she was alert and oriented x3, she reported that she went to the emergency department yesterday because she had trouble getting up from sitting. She reported that she is able to ambulate 1 standing but has trouble getting up. She also reported a worsening headache that is chronic. However she denied nausea and vomiting, denied weakness, denied pain, denied falling, denied feeling lightheaded. She denied any fevers or confusions or shortness of breath or urinary symptoms.     PAST MEDICAL / SURGICAL / SOCIAL / FAMILY HISTORY      has a past medical history of Arthritis, Cellulitis and abscess, Cellulitis and abscess of unspecified site, CHF (congestive heart failure) ([address] Chronic kidney disease, unspecified, Coronary atherosclerosis of native coronary artery, Essential hypertension, malignant, Hard of hearing, Iron deficiency anemia, unspecified, Myocardial infarction ([address] Other and unspecified hyperlipidemia, Pure hypercholesterolemia, Type II or unspecified type diabetes mellitus without mention of complication, not stated as uncontrolled, Unspecified asthma(493.90), Unspecified venous (peripheral) insufficiency, and Unspecified vitamin D deficiency. has a past surgical history that includes Tonsillectomy and adenoidectomy and Coronary artery bypass graft. Social History     Socioeconomic History    Marital status:      Spouse name: Not on file    Number of children: Not on file    Years of education: Not on file    Highest education level: Not on file   Occupational History    Not on file   Social Needs    Financial resource strain: Not on file    Food insecurity     Worry: Not on file     Inability: Not on file    Transportation needs     Medical: Not on file     Non-medical: Not on file   Tobacco Use    Smoking status: Former Smoker     Packs/day: 0.25     Years: 10.00     Pack years: 2.50     Last attempt to quit: 2000     Years since quittin.5    Smokeless tobacco: Never Used   Substance and Sexual Activity    Alcohol use:  Yes     Alcohol/week: 0.0 standard drinks    Drug use: No    Sexual activity: Not on file   Lifestyle    Physical activity     Days per week: Not on file     Minutes per session: Not on file    Stress: Not on file   Relationships    Social connections     Talks on phone: Not on file     Gets together: Not on file     Attends Orthodoxy service: Not on file     Active member of club or organization: Not on file     Attends meetings of clubs or organizations: Not on file     Relationship status: Not on file    Intimate partner violence     Fear of current or ex partner: Not on file     Emotionally abused: Not on file     Physically abused: Not on file     Forced sexual activity: Not on file   Other Topics Concern    Not on file   Social History Narrative    Not on file       Family History   Problem Relation Age of Onset    Diabetes Mother     Heart Pupils equal bilaterally, unreactive to light   Cardiovascular:      Rate and Rhythm: Normal rate and regular rhythm. Pulses: Normal pulses. Heart sounds: Normal heart sounds. Pulmonary:      Effort: Pulmonary effort is normal.      Breath sounds: Normal breath sounds. Abdominal:      Palpations: Abdomen is soft. Tenderness: There is no abdominal tenderness. Skin:     General: Skin is warm. Capillary Refill: Capillary refill takes less than 2 seconds. Neurological:      Mental Status: She is alert and oriented to person, place, and time. DIFFERENTIAL  DIAGNOSIS     PLAN (LABS / IMAGING / EKG):  Orders Placed This Encounter   Procedures    Inpatient consult to Hospitalist    Inpatient consult to Neurosurgery    Inpatient consult to Neurology    PATIENT STATUS (FROM ED OR OR/PROCEDURAL) Inpatient       MEDICATIONS ORDERED:  No orders of the defined types were placed in this encounter. DDX: Likely intracranial mass as diagnosed at [address] [facility]   LAB RESULTS:  No results found for this visit on 07/26/20. IMPRESSION: Intracranial mass, right-sided    RADIOLOGY:  None done at [facility]    EKG      All EKG's are interpreted by the Emergency Department Physician who either signs or Co-signs this chart in the absence of a cardiologist.    EMERGENCY DEPARTMENT COURSE:  ED Course as of Jul 26 1237   Sun Jul 26, 2020   1104 11:04 AM spoken to Stalin, spoken to neurosurgery. Stalin recommended a neurology consult as patient was in for strokelike symptoms at [facility]    [EM]      ED Course User Index  [EM] Amira Ascencio MD     PROCEDURES:  None    CONSULTS:  IP CONSULT TO HOSPITALIST  IP CONSULT TO NEUROSURGERY  IP CONSULT TO NEUROLOGY    CRITICAL CARE:  Please see attending note    FINAL IMPRESSION      1. Brain mass    2.  Lacunar infarction ([facility] [address]          DISPOSITION / PLAN     DISPOSITION Admitted

## 2020-07-27 LAB
ALBUMIN SERPL-MCNC: 2.2 G/DL (ref 3.5–5.2)
ALBUMIN/GLOBULIN RATIO: 0.7 (ref 1–2.5)
ALP BLD-CCNC: 174 U/L (ref 35–104)
ALT SERPL-CCNC: 16 U/L (ref 5–33)
ANION GAP SERPL CALCULATED.3IONS-SCNC: 15 MMOL/L (ref 9–17)
AST SERPL-CCNC: 24 U/L
BILIRUB SERPL-MCNC: 0.54 MG/DL (ref 0.3–1.2)
BUN BLDV-MCNC: 26 MG/DL (ref 8–23)
BUN/CREAT BLD: ABNORMAL (ref 9–20)
CALCIUM SERPL-MCNC: 8.7 MG/DL (ref 8.6–10.4)
CHLORIDE BLD-SCNC: 103 MMOL/L (ref 98–107)
CHOLESTEROL/HDL RATIO: 2.4
CHOLESTEROL: 117 MG/DL
CO2: 20 MMOL/L (ref 20–31)
CREAT SERPL-MCNC: 0.78 MG/DL (ref 0.5–0.9)
ESTIMATED AVERAGE GLUCOSE: 192 MG/DL
GFR AFRICAN AMERICAN: >60 ML/MIN
GFR NON-AFRICAN AMERICAN: >60 ML/MIN
GFR SERPL CREATININE-BSD FRML MDRD: ABNORMAL ML/MIN/{1.73_M2}
GFR SERPL CREATININE-BSD FRML MDRD: ABNORMAL ML/MIN/{1.73_M2}
GLUCOSE BLD-MCNC: 108 MG/DL (ref 65–105)
GLUCOSE BLD-MCNC: 133 MG/DL (ref 65–105)
GLUCOSE BLD-MCNC: 134 MG/DL (ref 65–105)
GLUCOSE BLD-MCNC: 142 MG/DL (ref 65–105)
GLUCOSE BLD-MCNC: 166 MG/DL (ref 70–99)
HBA1C MFR BLD: 8.3 % (ref 4–6)
HCT VFR BLD CALC: 40.9 % (ref 36.3–47.1)
HDLC SERPL-MCNC: 48 MG/DL
HEMOGLOBIN: 12.7 G/DL (ref 11.9–15.1)
LDL CHOLESTEROL: 47 MG/DL (ref 0–130)
MAGNESIUM: 1.8 MG/DL (ref 1.6–2.6)
MCH RBC QN AUTO: 29.1 PG (ref 25.2–33.5)
MCHC RBC AUTO-ENTMCNC: 31.1 G/DL (ref 28.4–34.8)
MCV RBC AUTO: 93.6 FL (ref 82.6–102.9)
NRBC AUTOMATED: 0 PER 100 WBC
PDW BLD-RTO: 15.8 % (ref 11.8–14.4)
PLATELET # BLD: 166 K/UL (ref 138–453)
PMV BLD AUTO: 11.3 FL (ref 8.1–13.5)
POTASSIUM SERPL-SCNC: 3.3 MMOL/L (ref 3.7–5.3)
RBC # BLD: 4.37 M/UL (ref 3.95–5.11)
SODIUM BLD-SCNC: 138 MMOL/L (ref 135–144)
TOTAL PROTEIN: 5.4 G/DL (ref 6.4–8.3)
TRIGL SERPL-MCNC: 112 MG/DL
TSH SERPL DL<=0.05 MIU/L-ACNC: 1.39 MIU/L (ref 0.3–5)
VLDLC SERPL CALC-MCNC: NORMAL MG/DL (ref 1–30)
WBC # BLD: 12.6 K/UL (ref 3.5–11.3)

## 2020-07-27 PROCEDURE — 92523 SPEECH SOUND LANG COMPREHEN: CPT

## 2020-07-27 PROCEDURE — 1200000000 HC SEMI PRIVATE

## 2020-07-27 PROCEDURE — 82947 ASSAY GLUCOSE BLOOD QUANT: CPT

## 2020-07-27 PROCEDURE — 6370000000 HC RX 637 (ALT 250 FOR IP): Performed by: CLINICAL NURSE SPECIALIST

## 2020-07-27 PROCEDURE — 6360000002 HC RX W HCPCS: Performed by: CLINICAL NURSE SPECIALIST

## 2020-07-27 PROCEDURE — 99232 SBSQ HOSP IP/OBS MODERATE 35: CPT | Performed by: INTERNAL MEDICINE

## 2020-07-27 PROCEDURE — 85027 COMPLETE CBC AUTOMATED: CPT

## 2020-07-27 PROCEDURE — 36415 COLL VENOUS BLD VENIPUNCTURE: CPT

## 2020-07-27 PROCEDURE — 83735 ASSAY OF MAGNESIUM: CPT

## 2020-07-27 PROCEDURE — 2580000003 HC RX 258: Performed by: CLINICAL NURSE SPECIALIST

## 2020-07-27 PROCEDURE — 83036 HEMOGLOBIN GLYCOSYLATED A1C: CPT

## 2020-07-27 PROCEDURE — 97166 OT EVAL MOD COMPLEX 45 MIN: CPT

## 2020-07-27 PROCEDURE — 6370000000 HC RX 637 (ALT 250 FOR IP): Performed by: INTERNAL MEDICINE

## 2020-07-27 PROCEDURE — 97535 SELF CARE MNGMENT TRAINING: CPT

## 2020-07-27 PROCEDURE — 97530 THERAPEUTIC ACTIVITIES: CPT

## 2020-07-27 PROCEDURE — 97162 PT EVAL MOD COMPLEX 30 MIN: CPT

## 2020-07-27 PROCEDURE — 99232 SBSQ HOSP IP/OBS MODERATE 35: CPT | Performed by: PSYCHIATRY & NEUROLOGY

## 2020-07-27 PROCEDURE — 80053 COMPREHEN METABOLIC PANEL: CPT

## 2020-07-27 PROCEDURE — 84443 ASSAY THYROID STIM HORMONE: CPT

## 2020-07-27 PROCEDURE — 80061 LIPID PANEL: CPT

## 2020-07-27 RX ORDER — NICOTINE POLACRILEX 4 MG
15 LOZENGE BUCCAL PRN
Status: DISCONTINUED | OUTPATIENT
Start: 2020-07-27 | End: 2020-07-31 | Stop reason: HOSPADM

## 2020-07-27 RX ORDER — DEXTROSE MONOHYDRATE 50 MG/ML
100 INJECTION, SOLUTION INTRAVENOUS PRN
Status: DISCONTINUED | OUTPATIENT
Start: 2020-07-27 | End: 2020-07-31 | Stop reason: HOSPADM

## 2020-07-27 RX ORDER — DEXTROSE MONOHYDRATE 25 G/50ML
12.5 INJECTION, SOLUTION INTRAVENOUS PRN
Status: DISCONTINUED | OUTPATIENT
Start: 2020-07-27 | End: 2020-07-31 | Stop reason: HOSPADM

## 2020-07-27 RX ORDER — POTASSIUM CHLORIDE 20 MEQ/1
40 TABLET, EXTENDED RELEASE ORAL PRN
Status: DISCONTINUED | OUTPATIENT
Start: 2020-07-27 | End: 2020-07-31 | Stop reason: HOSPADM

## 2020-07-27 RX ORDER — POTASSIUM CHLORIDE 7.45 MG/ML
10 INJECTION INTRAVENOUS PRN
Status: DISCONTINUED | OUTPATIENT
Start: 2020-07-27 | End: 2020-07-31 | Stop reason: HOSPADM

## 2020-07-27 RX ADMIN — ASPIRIN 300 MG: 300 SUPPOSITORY RECTAL at 08:30

## 2020-07-27 RX ADMIN — DESMOPRESSIN ACETATE 40 MG: 0.2 TABLET ORAL at 18:07

## 2020-07-27 RX ADMIN — FUROSEMIDE 40 MG: 40 TABLET ORAL at 21:22

## 2020-07-27 RX ADMIN — FUROSEMIDE 40 MG: 40 TABLET ORAL at 08:30

## 2020-07-27 RX ADMIN — ACETAMINOPHEN 650 MG: 325 TABLET ORAL at 04:05

## 2020-07-27 RX ADMIN — MEROPENEM 1 G: 1 INJECTION, POWDER, FOR SOLUTION INTRAVENOUS at 03:51

## 2020-07-27 RX ADMIN — METOPROLOL TARTRATE 50 MG: 50 TABLET, FILM COATED ORAL at 21:22

## 2020-07-27 RX ADMIN — ENOXAPARIN SODIUM 40 MG: 40 INJECTION SUBCUTANEOUS at 08:30

## 2020-07-27 RX ADMIN — SODIUM CHLORIDE, PRESERVATIVE FREE 10 ML: 5 INJECTION INTRAVENOUS at 08:32

## 2020-07-27 RX ADMIN — ALLOPURINOL 300 MG: 300 TABLET ORAL at 08:32

## 2020-07-27 RX ADMIN — METOPROLOL TARTRATE 50 MG: 50 TABLET, FILM COATED ORAL at 08:31

## 2020-07-27 RX ADMIN — CLOPIDOGREL 75 MG: 75 TABLET, FILM COATED ORAL at 08:31

## 2020-07-27 RX ADMIN — INSULIN LISPRO 2 UNITS: 100 INJECTION, SOLUTION INTRAVENOUS; SUBCUTANEOUS at 12:24

## 2020-07-27 RX ADMIN — POTASSIUM CHLORIDE 40 MEQ: 1500 TABLET, EXTENDED RELEASE ORAL at 10:24

## 2020-07-27 RX ADMIN — MEROPENEM 1 G: 1 INJECTION, POWDER, FOR SOLUTION INTRAVENOUS at 16:25

## 2020-07-27 RX ADMIN — CETIRIZINE HYDROCHLORIDE 10 MG: 10 TABLET ORAL at 08:32

## 2020-07-27 RX ADMIN — MULTIPLE VITAMINS W/ MINERALS TAB 1 TABLET: TAB at 08:30

## 2020-07-27 ASSESSMENT — PAIN SCALES - GENERAL
PAINLEVEL_OUTOF10: 3
PAINLEVEL_OUTOF10: 0

## 2020-07-27 ASSESSMENT — PAIN SCALES - WONG BAKER
WONGBAKER_NUMERICALRESPONSE: 8

## 2020-07-27 ASSESSMENT — PAIN DESCRIPTION - DESCRIPTORS: DESCRIPTORS: SHARP

## 2020-07-27 ASSESSMENT — PAIN DESCRIPTION - PAIN TYPE: TYPE: ACUTE PAIN

## 2020-07-27 ASSESSMENT — PAIN DESCRIPTION - ORIENTATION: ORIENTATION: RIGHT

## 2020-07-27 ASSESSMENT — PAIN DESCRIPTION - LOCATION: LOCATION: KNEE

## 2020-07-27 NOTE — PLAN OF CARE
Patient seen and examined with Dr. Amanda Fox. Patient is alert and oriented moving all extremities. Poor short-term memory. Patient daughter at bedside during exam.  No neurosurgical interventions needed for right frontal meningioma. Recommend follow-up MRI with and without contrast in 6 months to reevaluate mass.

## 2020-07-27 NOTE — CONSULTS
[facility] Neurology   IN-PATIENT SERVICE      NEUROLOGY PROGRESS  NOTE            Date:   7/27/2020  Patient name:  Alfredo Chang  Date of admission:  7/26/2020  YOB: 1950      Interval History:   Alfredo Chang is a  78 y.o. female admitted on 7/26/2020 with Stroke-like episode ([facility] [address] [I63.9]. This is a follow-up neurology progress note. The patient was seen and examined and the chart was reviewed. Interval history  7/27/2020:  Examined the patient at the bedside, daughter present in the room. As per the daughter patient has returned to the baseline. Patient working with the physical therapy walking with a wheeled walker. Patient denies any headache, dizziness, confusion, tingling, numbness, any weakness. Patient on aspirin 81, Lipitor 40, Plavix 75. Insulin sliding scale  Meropenem      intern    History of Present Illness: The patient is a 78 y.o. female with significant past medical history of HTN, CHF, decreased hearing, DM2 who presents with AMS, weakness and to to have Fever, UTI, Cellulitis, right middle cranial fossa meningioma                 As per daughter patient presented with 2 days of altered mental status, saying things incorrectly  And being confused. Patient also has bilateral knee problems and having some pain and weakness bilateral lower extremity. Patient was taken to [facility] ED where they found that the patient was having temperature of 39.7, positive for UTI and cellulitis. On CT head without contrast they noticed some old right basal ganglia infarct. CT head and neck showed 60% stenosis of the origin of the right internal carotid artery, 30% focal stenosis at the origin of the left internal carotid artery, 50% stenosis of the distal P2 segment of the left PCA. He also noticed some 2 cm extra-axial enhancing mass on the posterior aspect of the right middle cranial fossa that could be a meningioma.   Brain MRI with and without revealed Diagnostics:      Laboratory Testing:  CBC:   Recent Labs     07/25/20  1120 07/26/20  0530 07/27/20  0449   WBC 17.2* 13.4* 12.6*   HGB 13.7 12.7 12.7    151 166       BMP:    Recent Labs     07/25/20  1120 07/26/20  0530 07/27/20  0449    139 138   K 3.1* 3.4* 3.3*   CL 99 103 103   CO2 22 23 20   BUN 37* 30* 26*   CREATININE 0.90 0.73 0.78   GLUCOSE 157* 117* 166*         Lab Results   Component Value Date    CHOL 117 07/27/2020    LDLCHOLESTEROL 47 07/27/2020    HDL 48 07/27/2020    TRIG 112 07/27/2020    ALT 16 07/27/2020    AST 24 07/27/2020    TSH 1.39 07/27/2020    INR 1.0 07/26/2020    LABA1C 5.8 12/18/2019    LABMICR 155 (H) 02/27/2019    XUSNETYW28 1205 03/11/2020         No results found for: PHENYTOIN, PHENYTOIN, VALPROATE, CBMZ        Imaging/Diagnostics:       1. CT head without contrast  Impression    No acute intracranial abnormality. Remote appearing lacunar infarction right basal ganglia. 2. CTA head and neck  Impression    60% stenosis at the origin of the right internal carotid artery         30% focal stenosis at the origin of the left internal carotid artery. 50% focal stenosis in the distal P2 segment of the left PCA. 2 cm extra-axial enhancing mass along the anterior aspect of the right middle    cranial fossa, likely related to a meningioma. Further evaluation with MRI    brain with and without contrast is recommended. Old infarction in the right head of caudate nucleus. 3. Brain MRI with and without contrast  Impression    Probable meningioma right middle cranial fossa. Otherwise no acute disease.                          Impression:        Altered mental status  Right-sided meningioma  UTI  Cellulitis        Plan:     59-year-old female admitted to [facility] for the management of right-sided meningioma, UTI, cellulitis, altered mental status.         EEG  PT/OT  Aspirin 81  Plavix 75  Lipitor 40  Fall precaution  Neurosurgery: Awaiting completion  Electrolyte replacement as per primary, potassium 3.3  Meropenem day 2  Rest of the treatment as per primary      Patient was staffed with the attending Dr. Daphney Clement signed by Zenaida Judd MD on 7/27/2020 at 8:08 AM      Rory Wilhelm MD  Neurology PGY-2 Resident.    Neurology [facility]

## 2020-07-27 NOTE — PROGRESS NOTES
Speech Language Pathology  Facility/Department: [facility] NEURO  Initial Speech/Language/Cognitive Assessment    NAME: Lewis Schaeffer  :    MRN: [id number]  ADMISSION DATE: 2020  ADMITTING DIAGNOSIS: has Vitamin D deficiency; Venous (peripheral) insufficiency; Type 2 diabetes mellitus with stage 3 chronic kidney disease, without long-term current use of insulin ([address] Coronary artery disease involving native coronary artery of native heart without angina pectoris; Iron deficiency anemia; Stage 3 chronic kidney disease ([address] Colonoscopy refused; Pneumococcal vaccine refused; Bilateral hearing loss; Chronic diastolic CHF (congestive heart failure) ([address] Chronic obstructive pulmonary disease ([address] HTN. Benign hypertension with CKD (chronic kidney disease) stage III ([address] Gout with tophi; Hyperlipidemia with target LDL less than 70; Dry skin dermatitis HANDS; Acute CVA (cerebrovascular accident) ([address] Essential hypertension; Stroke-like episode ([address] Cellulitis of left lower extremity; Brain mass; Metabolic encephalopathy; and Meningioma, cerebral ([address] on their problem list.    Date of Eval: 2020   Evaluating Therapist: STACI Stoner    RECENT RESULTS  CT OF HEAD/MRI: MRI BRAIN WO CONTRAST 2020         Impression    Probable meningioma right middle cranial fossa.  Otherwise no acute disease.             Primary Complaint: Lewis Schaeffer is a 78 y.o. Non-/non  female who presents with Headache (Pt is a transfer from [facility] for neurology consult, has head mass)  She was transferred from [facility] ED to [facility] ED  for the management of Stroke-like episode [facility])  Symptoms are resolved today.     Pt was admitted as a direct admit last evening but there are no beds available hence the ED to ED transfer.     The pt presented with weakness (unable to stand),  H/A and confusion. She had nonsensical talk on arrival, NIHSS 7.   Her last normal was 7 pm Limits  Safety/Judgement  Safety/Judgement: Within Functional Limits  Verbal Sequencing: WFL  Word Associations: WFL     Prognosis:  Speech Therapy Prognosis  Prognosis: Fair  Individuals consulted  Consulted and agree with results and recommendations: Patient; Family member    Education:  Patient Education: yes  Patient Education Response: Verbalizes understanding          Therapy Time:   Individual Concurrent Group Co-treatment   Time In 1031         Time Out 1042         Minutes 11                 STACI Celestin  7/27/2020 11:01 AM

## 2020-07-27 NOTE — PROGRESS NOTES
Occupational Therapy   Occupational Therapy Initial Assessment  Date: 2020   Patient Name: Lewayne Gilford  MRN: [id number]     : 1950    Date of Service: 2020    Discharge Recommendations:  Patient would benefit from continued therapy after discharge. Pt would benefit from home OT eval to assess functional mobility and ADL performance. OT Equipment Recommendations  Equipment Needed: Yes  Mobility Devices: Rolling Walker, ADL Assistive Devices  ADL Assistive Devices: Transfer Tub Bench;Reacher;Sock-Aid Hard;Grab Bars - shower;Grab Bars - toilet    Assessment   Performance deficits / Impairments: Decreased functional mobility ; Decreased ADL status; Decreased high-level IADLs;Decreased safe awareness;Decreased endurance  Assessment: Pt would benefit from further skilled OT services to address decreased functional mobility, ADL performance, and endurance. Prognosis: Good  Decision Making: Medium Complexity  Patient Education: OT role, OT POC, benefit of using walker- good return noted this date. REQUIRES OT FOLLOW UP: Yes  Activity Tolerance  Activity Tolerance: Patient Tolerated treatment well  Safety Devices  Safety Devices in place: Yes  Type of devices: Left in chair;Call light within reach; All fall risk precautions in place(daughter in room)  Restraints  Initially in place: No           Patient Diagnosis(es): The primary encounter diagnosis was Brain mass. A diagnosis of Lacunar infarction [facility]) was also pertinent to this visit.      has a past medical history of Arthritis, Cellulitis and abscess, Cellulitis and abscess of unspecified site, CHF (congestive heart failure) ([facility] Chronic kidney disease, unspecified, Coronary atherosclerosis of native coronary artery, Essential hypertension, malignant, Hard of hearing, Iron deficiency anemia, unspecified, Myocardial infarction ([facility] Other and unspecified hyperlipidemia, Pure hypercholesterolemia, Type II or unspecified type diabetes mellitus without mention of complication, not stated as uncontrolled, Unspecified asthma(493.90), Unspecified venous (peripheral) insufficiency, and Unspecified vitamin D deficiency. has a past surgical history that includes Tonsillectomy and adenoidectomy and Coronary artery bypass graft. Restrictions  Restrictions/Precautions  Restrictions/Precautions: Up as Tolerated, General Precautions, Fall Risk  Required Braces or Orthoses?: No    Subjective   General  Patient assessed for rehabilitation services?: Yes  Family / Caregiver Present: Yes(Daughter)  Diagnosis: Stroke-like episode  General Comment  Comments: RN ok'd for OT eval. Pt agreeable and pleasant throughout. Patient Currently in Pain: No  Pain Assessment  Pain Assessment: 0-10  Pain Level: 0  Vital Signs  Patient Currently in Pain: No     Social/Functional History  Social/Functional History  Lives With: Son  Type of Home: House  Home Layout: Two level, 1/2 bath on main level  Home Access: Stairs to enter with rails  Entrance Stairs - Number of Steps: 3  Entrance Stairs - Rails: Right  Bathroom Shower/Tub: Tub/Shower unit  Bathroom Toilet: Standard  Bathroom Equipment: Grab bars in shower  Bathroom Accessibility: Accessible  Home Equipment: (none)  ADL Assistance: Independent  Homemaking Assistance: Independent  Homemaking Responsibilities: Yes(Pt shares home responsibilities with son.)  Meal Prep Responsibility: Secondary  Laundry Responsibility: Secondary  Cleaning Responsibility: Secondary  Shopping Responsibility: Secondary  Ambulation Assistance: Independent  Transfer Assistance: Independent  Active : No(Not for a \"long time\", dtr/son perform all)  Mode of Transportation: Family  Occupation: Retired  Leisure & Hobbies: shopping, arts and crafting, movies  Additional Comments: Son works outside of home, often works long hours. Daughter lives in area and is available to come and help pt if needed.        Objective   Vision: Impaired  Vision Exceptions: Wears glasses at all times  Hearing: Exceptions to [facility]  Hearing Exceptions: Hard of hearing/hearing concerns       Balance  Sitting Balance: Stand by assistance  Standing Balance: Contact guard assistance  Standing Balance  Time: 5 mins  Activity: Pt stood sinkside to complete grooming tasks and stood chairside to make up recliner before sitting down. Functional Mobility  Functional - Mobility Device: Rolling Walker(Pt used RW to ambulate from bed to bathroom and used no device from bed to chair after rest break.)  Activity: To/from bathroom; Other(to chair)  Assist Level: Contact guard assistance  Functional Mobility Comments: Pt ambulated from bed to bathroom using RW, completed grooming tasks standing sinkside. Pt ambulated from bathroom to bed and sat EOB to take a rest break. Pt ambulated from bed to recliner with no device, 1 LOB noted, pt stablilized self on chair and was CGA for safety throughout session. ADL  Feeding: Modified independent ; Increased time to complete;Setup  Grooming: Setup; Increased time to complete;Stand by assistance  UE Bathing: Setup; Increased time to complete;Stand by assistance  LE Bathing: Setup; Increased time to complete;Contact guard assistance  UE Dressing: Setup; Increased time to complete;Stand by assistance  LE Dressing: Setup; Increased time to complete;Contact guard assistance  Toileting: Setup; Increased time to complete;Contact guard assistance  Additional Comments: Pt doffed and donned sock sitting EOB, bending at hips to complete, requiring CGA for safety. Pt completed teeth brushing, standing sinkside inside RW, with SBA for safety. Pt washed face standing sinkside, standing inside RW, with SBA for safety. Pt ambulated from bathroom to chair with CGA and 1 LOB noted, pt stablized using nearby chair. Pt placed sheet and pad on hospital recliner with min A for positioning and for stabilization while bending over chair.  Pt had an uncontrolled sit into recliner d/t focus on trying to arrange gown into desired position in back, min A used to lower pt to chair safely. Tone RUE  RUE Tone: Normotonic  Tone LUE  LUE Tone: Normotonic  Coordination  Movements Are Fluid And Coordinated: Yes     Bed mobility  Scooting: Stand by assistance  Transfers  Sit to stand: Contact guard assistance  Stand to sit: Contact guard assistance  Transfer Comments: Increased time noted to complete sit to stand/stand to sit transfer. Cognition  Overall Cognitive Status: Exceptions  Arousal/Alertness: Delayed responses to stimuli  Following Commands: Follows one step commands with increased time; Follows one step commands with repetition; Follows multistep commands with increased time; Follows multistep commands with repitition  Attention Span: Difficulty attending to directions(likely d/t to pt being hard of hearing)  Memory: Decreased recall of biographical Information  Safety Judgement: Decreased awareness of need for assistance;Decreased awareness of need for safety  Insights: Decreased awareness of deficits  Initiation: Requires cues for some  Sequencing: Requires cues for some        Sensation  Overall Sensation Status: WFL        LUE AROM (degrees)  LUE AROM : WFL  Left Hand AROM (degrees)  Left Hand AROM: WFL  RUE AROM (degrees)  RUE AROM : WFL  Right Hand AROM (degrees)  Right Hand AROM: WFL  LUE Strength  Gross LUE Strength: WFL  L Hand General: 5/5  RUE Strength  Gross RUE Strength: WFL  R Hand General: 5/5       Plan   Plan  Times per week: 2-3x/wk  Current Treatment Recommendations: Self-Care / ADL, Functional Mobility Training, Endurance Training, Patient/Caregiver Education & Training, Safety Education & Training      AM-PAC Score  AM-[facility] Inpatient Daily Activity Raw Score: 19 (07/27/20 1044)  AM-PAC Inpatient ADL T-Scale Score : 40.22 (07/27/20 1044)  ADL Inpatient CMS 0-100% Score: 42.8 (07/27/20 1044)  ADL Inpatient CMS G-Code Modifier : CK (07/27/20 1044)    Goals  Short term goals  Time Frame for Short term goals: Pt will, by discharge  Short term goal 1: demo UB/LB ADLs with mod (I) and with AE PRN  Short term goal 2: demo functional mobility/transfers with mod (I) and LRD and good safety awareness  Short term goal 3: demo activity tolerance for 35 mins+ during ADL task  Short term goal 4: demo EC/WS tech's during ADL activity with 1 VC PRN       Therapy Time   Individual Concurrent Group Co-treatment   Time In 0847         Time Out 0920         Minutes 33         Timed Code Treatment Minutes: 7640 Fidelia Moreno, OT/S

## 2020-07-27 NOTE — PROGRESS NOTES
Physician Progress Note      Fidel Patterson  [facility] #:                  [id number]  :                       1950  ADMIT DATE:       2020 10:23 AM  DISCH DATE:  RESPONDING  PROVIDER #:        Radha Acosta MD          QUERY TEXT:    Pt admitted with LLE cellulitis. Pt noted to have DM type 2. If possible,   please document in progress notes and discharge summary the relationship, if   any, between cellulitis and DM. The medical record reflects the following:  Risk Factors: DM 2  Clinical Indicators: Per H&P mild cellulitis left lower leg. +1 edema lower   legs to mid calf with erythema bilat anterior tibias left > right. Treatment: IV Meropenem    Thank you, Antony Shahid RN CDS, please call with questions [phone],   6a-2:30p M-F  Options provided:  -- Left lower extremity cellulitis due to Diabetes  -- Left lower extremity cellulitis unrelated to Diabetes  -- Other - I will add my own diagnosis  -- Disagree - Clinically unable to determine / Unknown  -- Refer to Clinical Documentation Reviewer    PROVIDER RESPONSE TEXT:    Left lower extremity cellulitis d/t Diabetes. Query created by: Aleksandra Ellison on 2020 1:05 PM      QUERY TEXT:    Patient admitted with suspected meningioma. Noted documentation of UTI per   neurology consult notes. If possible, please document in progress notes and   discharge summary    The medical record reflects the following:  Risk Factors: age  Clinical Indicators:  Urine Cx: no growth   UA: nitrite negative; leukocyte esterase negative, few bacteria  Treatment: pt with cellulitis on IV Meropenem    Thank you, Antony Shahid RN  CDS, please call with questions [phone],   6a-2:30p M-F  Options provided:  -- UTI was ruled out  -- UTI present as evidenced by, Please document evidence.   -- Other - I will add my own diagnosis  -- Disagree - Clinically unable to determine / Unknown  -- Refer to Clinical Documentation Reviewer    PROVIDER RESPONSE TEXT:    UTI was ruled out after study.     Query created by: Ebony Ortiz on 7/27/2020 1:17 PM      Electronically signed by:  Regla Dillard MD 7/27/2020 4:30 PM

## 2020-07-27 NOTE — PROGRESS NOTES
[facility]    Progress Note    7/27/2020    8:47 AM    Name:   Duran Mccloud  MRN:     [id number]     Acct:      [de-identified]   Room:   [address] [place] [address] Day:  1  Admit Date:  7/26/2020 10:23 AM    PCP:   Nataliia Hayden MD  Code Status:  Full Code    Subjective:     C/C:   Chief Complaint   Patient presents with    Headache     Pt is a transfer from [facility] for neurology consult, has head mass     Interval History Status: improved. Patient was seen and evaluated at bedside this morning. She reports that she is feeling great. Patient denies any complaints at this time. Brief History: This is a 76-year-old female who came in from [place] after she presented there with slurred speech and expressive aphasia that have been going on for 2 days. Patient also had a fall week ago without loss of consciousness. In the work-up at outlying facility patient was found to have 2 cm mass concerning for meningioma. She was transferred to our facility for further management and evaluation from neurology and neurosurgery. Review of Systems:     Constitutional:  negative for chills, fevers, sweats  Respiratory:  negative for cough, dyspnea on exertion, shortness of breath, wheezing  Cardiovascular:  negative for chest pain, chest pressure/discomfort, lower extremity edema, palpitations  Gastrointestinal:  negative for abdominal pain, constipation, diarrhea, nausea, vomiting  Neurological:  negative for dizziness, headache    Medications: Allergies:     Allergies   Allergen Reactions    Aleve [Naproxen Sodium]      Chronic kidney disease stage III, CHF    Pioglitazone Other (See Comments)     Congestive heart failure    Claritin [Loratadine]     Keflex [Cephalexin]     Lisinopril      Needs clarification of contraindication       Current Meds:   Scheduled Meds:    allopurinol  300 mg Oral Daily    atorvastatin  40 mg Oral Daily    insulin lispro  0-12 Units Subcutaneous TID WC    insulin lispro  0-6 Units Subcutaneous Nightly    clopidogrel  75 mg Oral Daily    cetirizine  10 mg Oral Daily    furosemide  40 mg Oral BID    metoprolol tartrate  50 mg Oral BID    therapeutic multivitamin-minerals  1 tablet Oral Daily    sodium chloride flush  10 mL Intravenous 2 times per day    aspirin  81 mg Oral Daily    Or    aspirin  300 mg Rectal Daily    meropenem  1 g Intravenous Q12H    enoxaparin  40 mg Subcutaneous Daily     Continuous Infusions:    dextrose       PRN Meds: glucose, dextrose, glucagon (rDNA), dextrose, albuterol sulfate HFA, sodium chloride flush, magnesium hydroxide, promethazine **OR** ondansetron, acetaminophen, labetalol, hydrALAZINE    Data:     Past Medical History:   has a past medical history of Arthritis, Cellulitis and abscess, Cellulitis and abscess of unspecified site, CHF (congestive heart failure) ([facility] [address] Chronic kidney disease, unspecified, Coronary atherosclerosis of native coronary artery, Essential hypertension, malignant, Hard of hearing, Iron deficiency anemia, unspecified, Myocardial infarction ([facility] [address] Other and unspecified hyperlipidemia, Pure hypercholesterolemia, Type II or unspecified type diabetes mellitus without mention of complication, not stated as uncontrolled, Unspecified asthma(493.90), Unspecified venous (peripheral) insufficiency, and Unspecified vitamin D deficiency. Social History:   reports that she quit smoking about 20 years ago. She has a 2.50 pack-year smoking history. She has never used smokeless tobacco. She reports current alcohol use. She reports that she does not use drugs.      Family History:   Family History   Problem Relation Age of Onset    Diabetes Mother     Heart Disease Mother     Heart Disease Father     Diabetes Sister     High Blood Pressure Sister     Diabetes Maternal Grandmother        Vitals:  BP (!) 174/82   Pulse 85   Temp 98.2 °F (36.8 °C) BE, THGBART, THB, HPB6MJA, EEZN4HGU, P8SRVAOY, O2SAT, FIO2  Lab Results   Component Value Date/Time    SPECIAL lac 10ml in both. 07/25/2020 12:05 PM     Lab Results   Component Value Date/Time    CULTURE NO GROWTH 2 DAYS 07/25/2020 12:05 PM       Radiology:  Ct Head Wo Contrast    Result Date: 7/25/2020  No acute intracranial abnormality. Remote appearing lacunar infarction right basal ganglia. Xr Chest Portable    Result Date: 7/25/2020  Mild pulmonary vascular congestion. Stable rounded opacity projected in the region the right hilum is of uncertain etiology but could represent right pulmonary artery en face. CT chest could be obtained for confirmation. Ct Chest Pulmonary Embolism W Contrast    Result Date: 7/25/2020  1. No evidence of pulmonary embolism or acute pulmonary abnormality. No mass lesion. 2. Dilatation of the main pulmonary artery to 3.6 cm which can be seen in pulmonary arterial hypertension. 3. Partially imaged splenomegaly of uncertain etiology. Cta Head Neck W Contrast    Result Date: 7/25/2020  60% stenosis at the origin of the right internal carotid artery 30% focal stenosis at the origin of the left internal carotid artery. 50% focal stenosis in the distal P2 segment of the left PCA. 2 cm extra-axial enhancing mass along the anterior aspect of the right middle cranial fossa, likely related to a meningioma. Further evaluation with MRI brain with and without contrast is recommended. Old infarction in the right head of caudate nucleus. Mri Brain W Wo Contrast    Result Date: 7/25/2020  Probable meningioma right middle cranial fossa. Otherwise no acute disease.        Physical Examination:        General appearance:  alert, cooperative and no distress  Mental Status:  oriented to person, place and time and normal affect  Lungs:  clear to auscultation bilaterally, normal effort  Heart:  regular rate and rhythm, no murmur  Abdomen:  soft, nontender, nondistended, normal bowel sounds, no masses, hepatomegaly, splenomegaly  Extremities:  no edema, redness, tenderness in the calves  Skin:  no gross lesions, rashes, induration    Assessment:        Hospital Problems           Last Modified POA    * (Principal) Stroke-like episode ([address] 7/26/2020 Yes    Venous (peripheral) insufficiency 7/26/2020 Yes    Type 2 diabetes mellitus with stage 3 chronic kidney disease, without long-term current use of insulin ([address] 7/26/2020 Yes    Coronary artery disease involving native coronary artery of native heart without angina pectoris 7/26/2020 Yes    Stage 3 chronic kidney disease ([address] 7/26/2020 Yes    Bilateral hearing loss 7/26/2020 Yes    Chronic diastolic CHF (congestive heart failure) ([address] 7/26/2020 Yes    Chronic obstructive pulmonary disease ([address] 7/26/2020 Yes    Gout with tophi 7/26/2020 Yes    Essential hypertension (Chronic) 7/26/2020 Yes    Cellulitis of left lower extremity 9/82/5454 Yes    Metabolic encephalopathy 3/77/3295 Yes    Meningioma, cerebral ([address] 7/26/2020 Yes          Plan:        Dysarthria, expressive aphasia and suspected meningioma  -Neurology on board, neurosurgery on board  -No neurosurgical intervention needed at this time  -Follow-up MRI in 6 months    Metabolic encephalopathy  -Resolved  -EEG plan    Bilateral internal carotid artery stenosis  -Right RCA 60% stenosis  -Left ICA 30% stenosis  -Left PCA 50% stenosis    Hypertension  -Continue current management    Hypokalemia  -Potassium replacement ordered    Leukocytosis  -On IV antibiotics  -Pancultured    Tobias Ramirez MD  7/27/2020  8:47 AM

## 2020-07-27 NOTE — PROGRESS NOTES
Physical Therapy    Facility/Department: [facility] NEURO  Initial Assessment    NAME: Radha Gonzalez  : [birth year]  MRN: [id number]    Date of Service: 2020  77 yo wf with confusion for 2 days not making sense taken to [facility] ER by daughter . Head CT with old right caudate lacune  MRI of Head with right anterior middle cranial fossa 18 x 21 mm lesion consistent with meningioma . There was elevated WBC at 13.4 k with initial concern whether there maybe cerebral abscess transferred to [facility] placed on IV zozyn and vancomycin IV{B . Patient does have cellulitis in legs . Confusion has improved on transfer being to close to baseline per daughter . She does have bilateral hearing loss . CTA head and neck Right ICA 60 % stenosis . Left ICA 30 % stenosis . Left PCA 50 % stenosis . She has been seen by neurosurgery agreeing with meningoma on MRI not needing any intervention   On exam she is alert and oriented x3 . There is normal language process . There are normal cranial nerves with full motor strength with normal cerebellar exam with normal sensation with symmetrical reflexes   Impression Metabolic encephalopathy from infection . Intracranial meningioma  Discharge Recommendations:  Patient would benefit from continued therapy after discharge   PT Equipment Recommendations  Equipment Needed: Yes  Mobility Devices: Wilfred [place]: Rolling    Assessment   Body structures, Functions, Activity limitations: Decreased functional mobility ; Decreased balance  Specific instructions for Next Treatment: Needs to do stairs  Prognosis: Good  Decision Making: Medium Complexity  PT Education: Plan of Care;General Safety  REQUIRES PT FOLLOW UP: Yes  Activity Tolerance  Activity Tolerance: Patient Tolerated treatment well       Patient Diagnosis(es): The primary encounter diagnosis was Brain mass. A diagnosis of Lacunar infarction [facility]) was also pertinent to this visit.      has a past medical history of Arthritis, Cellulitis and abscess, Cellulitis and abscess of unspecified site, CHF (congestive heart failure) ([facility] [address] Chronic kidney disease, unspecified, Coronary atherosclerosis of native coronary artery, Essential hypertension, malignant, Hard of hearing, Iron deficiency anemia, unspecified, Myocardial infarction ([facility] [address] Other and unspecified hyperlipidemia, Pure hypercholesterolemia, Type II or unspecified type diabetes mellitus without mention of complication, not stated as uncontrolled, Unspecified asthma(493.90), Unspecified venous (peripheral) insufficiency, and Unspecified vitamin D deficiency. has a past surgical history that includes Tonsillectomy and adenoidectomy and Coronary artery bypass graft. Restrictions  Restrictions/Precautions  Restrictions/Precautions: Up as Tolerated  Required Braces or Orthoses?: No  Vision/Hearing  Vision: Within Functional Limits  Vision Exceptions: Wears glasses at all times  Hearing: Exceptions to [facility]  Hearing Exceptions: Hard of hearing/hearing concerns     Subjective  General  Patient assessed for rehabilitation services?: Yes  Family / Caregiver Present: Yes(Daughter)  Follows Commands: Within Functional Limits  General Comment  Comments: Pt up in chair. Agreeable to ambulate.   Pain Screening  Patient Currently in Pain: Yes  Pain Assessment  Pain Assessment: Faces  Kessler-Baker Pain Rating: Hurts whole lot  Pain Type: Acute pain  Pain Location: Knee  Pain Orientation: Right  Pain Descriptors: Sharp  Vital Signs  Patient Currently in Pain: Yes  Pre Treatment Pain Screening  Intervention List: Patient able to continue with treatment    Orientation  Orientation  Overall Orientation Status: Within Functional Limits  Social/Functional History  Social/Functional History  Lives With: Son  Type of Home: House  Home Layout: Two level, 1/2 bath on main level  Home Access: Stairs to enter with rails  Entrance Stairs - Number of Steps: 3  Entrance Stairs - Rails: Right  Bathroom Shower/Tub: instructions for Next Treatment: Needs to do stairs  Current Treatment Recommendations: Transfer Training, Gait Training, Functional Mobility Training, Stair training, Safety Education & Training, Equipment Evaluation, Education, & procurement  Safety Devices  Type of devices: Gait belt, Call light within reach, Left in chair, Nurse notified    AM-PAC Score  AM-PAC Inpatient Mobility Raw Score : 16 (07/27/20 1139)  AM-PAC Inpatient T-Scale Score : 40.78 (07/27/20 1139)  Mobility Inpatient CMS 0-100% Score: 54.16 (07/27/20 1139)  Mobility Inpatient CMS G-Code Modifier : CK (07/27/20 1139)          Goals  Short term goals  Time Frame for Short term goals: 14 visits  Short term goal 1: Independent bed mobility  Short term goal 2: Independent transfers  Short term goal 3:  Independent ambulation with least restricitve 300 ft  Short term goal 4: Pt to navigate 12 stairs with 1 rail SBA  Short term goal 5: Pt to tolerate 30 minutes of activity to improve safety with mobility  Patient Goals   Patient goals : GO home       Therapy Time   Individual Concurrent Group Co-treatment   Time In 1050         Time Out 1113         Minutes 23         Timed Code Treatment Minutes: 112 Nova Charan MCKNIGHT, PT

## 2020-07-28 PROBLEM — I48.91 NEW ONSET ATRIAL FIBRILLATION (HCC): Status: ACTIVE | Noted: 2020-07-28

## 2020-07-28 LAB
GLUCOSE BLD-MCNC: 135 MG/DL (ref 65–105)
GLUCOSE BLD-MCNC: 138 MG/DL (ref 65–105)
GLUCOSE BLD-MCNC: 172 MG/DL (ref 65–105)
GLUCOSE BLD-MCNC: 182 MG/DL (ref 65–105)

## 2020-07-28 PROCEDURE — 6370000000 HC RX 637 (ALT 250 FOR IP): Performed by: CLINICAL NURSE SPECIALIST

## 2020-07-28 PROCEDURE — 99233 SBSQ HOSP IP/OBS HIGH 50: CPT | Performed by: INTERNAL MEDICINE

## 2020-07-28 PROCEDURE — 6370000000 HC RX 637 (ALT 250 FOR IP): Performed by: INTERNAL MEDICINE

## 2020-07-28 PROCEDURE — 6360000002 HC RX W HCPCS: Performed by: INTERNAL MEDICINE

## 2020-07-28 PROCEDURE — 6360000002 HC RX W HCPCS: Performed by: CLINICAL NURSE SPECIALIST

## 2020-07-28 PROCEDURE — 2580000003 HC RX 258: Performed by: INTERNAL MEDICINE

## 2020-07-28 PROCEDURE — 2500000003 HC RX 250 WO HCPCS: Performed by: NURSE PRACTITIONER

## 2020-07-28 PROCEDURE — 6360000002 HC RX W HCPCS: Performed by: STUDENT IN AN ORGANIZED HEALTH CARE EDUCATION/TRAINING PROGRAM

## 2020-07-28 PROCEDURE — 93005 ELECTROCARDIOGRAM TRACING: CPT | Performed by: INTERNAL MEDICINE

## 2020-07-28 PROCEDURE — 95819 EEG AWAKE AND ASLEEP: CPT | Performed by: PSYCHIATRY & NEUROLOGY

## 2020-07-28 PROCEDURE — 99232 SBSQ HOSP IP/OBS MODERATE 35: CPT | Performed by: PSYCHIATRY & NEUROLOGY

## 2020-07-28 PROCEDURE — 97129 THER IVNTJ 1ST 15 MIN: CPT

## 2020-07-28 PROCEDURE — 97130 THER IVNTJ EA ADDL 15 MIN: CPT

## 2020-07-28 PROCEDURE — 95816 EEG AWAKE AND DROWSY: CPT

## 2020-07-28 PROCEDURE — 82947 ASSAY GLUCOSE BLOOD QUANT: CPT

## 2020-07-28 PROCEDURE — 2580000003 HC RX 258: Performed by: CLINICAL NURSE SPECIALIST

## 2020-07-28 PROCEDURE — 1200000000 HC SEMI PRIVATE

## 2020-07-28 RX ORDER — DILTIAZEM HYDROCHLORIDE 5 MG/ML
5 INJECTION INTRAVENOUS ONCE
Status: COMPLETED | OUTPATIENT
Start: 2020-07-28 | End: 2020-07-28

## 2020-07-28 RX ADMIN — ENOXAPARIN SODIUM 60 MG: 60 INJECTION SUBCUTANEOUS at 20:36

## 2020-07-28 RX ADMIN — MULTIPLE VITAMINS W/ MINERALS TAB 1 TABLET: TAB at 08:55

## 2020-07-28 RX ADMIN — DESMOPRESSIN ACETATE 40 MG: 0.2 TABLET ORAL at 17:10

## 2020-07-28 RX ADMIN — SODIUM CHLORIDE, PRESERVATIVE FREE 10 ML: 5 INJECTION INTRAVENOUS at 20:37

## 2020-07-28 RX ADMIN — SODIUM CHLORIDE, PRESERVATIVE FREE 10 ML: 5 INJECTION INTRAVENOUS at 08:56

## 2020-07-28 RX ADMIN — MEROPENEM 1 G: 1 INJECTION, POWDER, FOR SOLUTION INTRAVENOUS at 05:19

## 2020-07-28 RX ADMIN — ENOXAPARIN SODIUM 40 MG: 40 INJECTION SUBCUTANEOUS at 08:55

## 2020-07-28 RX ADMIN — DILTIAZEM HYDROCHLORIDE 5 MG: 5 INJECTION INTRAVENOUS at 07:01

## 2020-07-28 RX ADMIN — AMIODARONE HYDROCHLORIDE 1 MG/MIN: 50 INJECTION, SOLUTION INTRAVENOUS at 10:52

## 2020-07-28 RX ADMIN — ALLOPURINOL 300 MG: 300 TABLET ORAL at 08:55

## 2020-07-28 RX ADMIN — ACETAMINOPHEN 650 MG: 325 TABLET ORAL at 20:43

## 2020-07-28 RX ADMIN — AMIODARONE HYDROCHLORIDE 0.5 MG/MIN: 50 INJECTION, SOLUTION INTRAVENOUS at 16:25

## 2020-07-28 RX ADMIN — FUROSEMIDE 40 MG: 40 TABLET ORAL at 20:36

## 2020-07-28 RX ADMIN — METOPROLOL TARTRATE 50 MG: 50 TABLET, FILM COATED ORAL at 08:55

## 2020-07-28 RX ADMIN — FUROSEMIDE 40 MG: 40 TABLET ORAL at 08:55

## 2020-07-28 RX ADMIN — CLOPIDOGREL 75 MG: 75 TABLET, FILM COATED ORAL at 08:55

## 2020-07-28 RX ADMIN — MEROPENEM 1 G: 1 INJECTION, POWDER, FOR SOLUTION INTRAVENOUS at 17:10

## 2020-07-28 RX ADMIN — AMIODARONE HYDROCHLORIDE 0.5 MG/MIN: 50 INJECTION, SOLUTION INTRAVENOUS at 20:36

## 2020-07-28 RX ADMIN — AMIODARONE HYDROCHLORIDE 150 MG: 50 INJECTION, SOLUTION INTRAVENOUS at 10:41

## 2020-07-28 RX ADMIN — INSULIN LISPRO 2 UNITS: 100 INJECTION, SOLUTION INTRAVENOUS; SUBCUTANEOUS at 13:34

## 2020-07-28 RX ADMIN — METOPROLOL TARTRATE 50 MG: 50 TABLET, FILM COATED ORAL at 20:44

## 2020-07-28 RX ADMIN — INSULIN LISPRO 1 UNITS: 100 INJECTION, SOLUTION INTRAVENOUS; SUBCUTANEOUS at 20:37

## 2020-07-28 RX ADMIN — CETIRIZINE HYDROCHLORIDE 10 MG: 10 TABLET ORAL at 08:55

## 2020-07-28 RX ADMIN — Medication 81 MG: at 08:55

## 2020-07-28 ASSESSMENT — PAIN SCALES - GENERAL
PAINLEVEL_OUTOF10: 2
PAINLEVEL_OUTOF10: 0
PAINLEVEL_OUTOF10: 2
PAINLEVEL_OUTOF10: 0
PAINLEVEL_OUTOF10: 0

## 2020-07-28 ASSESSMENT — PAIN DESCRIPTION - LOCATION: LOCATION: HEAD

## 2020-07-28 ASSESSMENT — PAIN DESCRIPTION - PAIN TYPE: TYPE: ACUTE PAIN

## 2020-07-28 NOTE — PROGRESS NOTES
Speech Language Pathology  Speech Language Pathology  [facility]    Cognitive Treatment Note    Date: 7/28/2020  Patients Name: Nate Girard  MRN: [id number]  Diagnosis:   Patient Active Problem List   Diagnosis Code    Vitamin D deficiency E55.9    Venous (peripheral) insufficiency I87.2    Type 2 diabetes mellitus with stage 3 chronic kidney disease, without long-term current use of insulin ([facility] E11.22, N18.3    Coronary artery disease involving native coronary artery of native heart without angina pectoris I25.10    Iron deficiency anemia D50.9    Stage 3 chronic kidney disease ([facility] N18.3    Colonoscopy refused Z53.20    Pneumococcal vaccine refused Z28.21    Bilateral hearing loss H91.93    Chronic diastolic CHF (congestive heart failure) ([facility]) I50.32    Chronic obstructive pulmonary disease ([facility] J44.9    HTN. Benign hypertension with CKD (chronic kidney disease) stage III ([facility]) I12.9, N18.3    Gout with tophi M1A. 9XX1    Hyperlipidemia with target LDL less than 70 E78.5    Dry skin dermatitis HANDS L85.3    Acute CVA (cerebrovascular accident) ([facility] I63.9    Essential hypertension I10    Stroke-like episode (HCC) I63.9    Cellulitis of left lower extremity L03. 116    Brain mass R69.39    Metabolic encephalopathy A78.16    Meningioma, cerebral (HCC) D32.0       Pain: 0/10    Cognitive Treatment    Treatment time: 9:42-10:07    Subjective: [x] Alert [x] Cooperative     [] Confused     [] Agitated    [] Lethargic    Objective/Assessment: Pt [place], which may have interfered with performance. Pt required multiple repetitions of stimuli throughout session     Recall: Pt given education re: memory compensatory strategies to aid in recall. Written copy of strategies leftat bedside.    Delayed picture recall, 3 units (associated): 3/3 x1 independently (5-minute delay)   Delayed word recall, 3 units (associated): 0/3 increased to 3/3 with min-mod verbal and visual cues (10-minute delay)   Memory and mental manipulation, size: 2/8 increased to 8/8 with min verbal cues and repetitions   Memory and mental manipulation, sentences: 3/10 increased to 10/10 with word initial cues and repetitions    Plan:  [x] Continue ST services    [] Discharge from [address] [place] [address] Dr:      Discharge recommendations: Further therapy recommended at discharge.     Treatment completed by: Gilbert Gracia M.S., CF-SLP

## 2020-07-28 NOTE — PROGRESS NOTES
[facility]    Progress Note    7/28/2020    9:52 AM    Name:   Myra Christine  MRN:     [id number]     Acct:      [de-identified]   Room:   [address] [place] [address] Day:  2  Admit Date:  7/26/2020 10:23 AM    PCP:   Jacinto Howe MD  Code Status:  Full Code    Subjective:     C/C:   Chief Complaint   Patient presents with    Headache     Pt is a transfer from [facility] for neurology consult, has head mass     Interval History Status:     Developed A fib RVR overnight  No history of a fib per patient/family   NSR on EKG in ER  Has prior history of MI/CAD, has not followed with cardiology for a while per patient  Weakness improved  No other complaints  Daughter at bedside updated     Brief History: This is a 65-year-old female who came in from [facility] after she presented there with slurred speech and expressive aphasia that have been going on for 2 days. Patient also had a fall week ago without loss of consciousness. In the work-up at outlying facility patient was found to have 2 cm mass concerning for meningioma. She was transferred to our facility for further management and evaluation from neurology and neurosurgery. Review of Systems:     Constitutional:  negative for chills, fevers, sweats  Respiratory:  negative for cough, dyspnea on exertion, shortness of breath, wheezing  Cardiovascular:  negative for chest pain, chest pressure/discomfort  Gastrointestinal:  negative for abdominal pain, constipation, diarrhea, nausea, vomiting  Neurological:  negative for dizziness, headache    Medications: Allergies:     Allergies   Allergen Reactions    Aleve [Naproxen Sodium]      Chronic kidney disease stage III, CHF    Pioglitazone Other (See Comments)     Congestive heart failure    Claritin [Loratadine]     Keflex [Cephalexin]     Lisinopril      Needs clarification of contraindication       Current Meds:   Scheduled Meds:    amiodarone  150 mg Intravenous Once    enoxaparin  40 mg Subcutaneous BID    allopurinol  300 mg Oral Daily    atorvastatin  40 mg Oral Daily    insulin lispro  0-12 Units Subcutaneous TID WC    insulin lispro  0-6 Units Subcutaneous Nightly    clopidogrel  75 mg Oral Daily    cetirizine  10 mg Oral Daily    furosemide  40 mg Oral BID    metoprolol tartrate  50 mg Oral BID    therapeutic multivitamin-minerals  1 tablet Oral Daily    sodium chloride flush  10 mL Intravenous 2 times per day    aspirin  81 mg Oral Daily    Or    aspirin  300 mg Rectal Daily    meropenem  1 g Intravenous Q12H     Continuous Infusions:    amiodarone      Followed by   Valery Amaya amiodarone      dextrose       PRN Meds: glucose, dextrose, glucagon (rDNA), dextrose, potassium chloride **OR** potassium alternative oral replacement **OR** potassium chloride, albuterol sulfate HFA, sodium chloride flush, magnesium hydroxide, promethazine **OR** ondansetron, acetaminophen, labetalol, hydrALAZINE    Data:     Past Medical History:   has a past medical history of Arthritis, Cellulitis and abscess, Cellulitis and abscess of unspecified site, CHF (congestive heart failure) ([address] Chronic kidney disease, unspecified, Coronary atherosclerosis of native coronary artery, Essential hypertension, malignant, Hard of hearing, Iron deficiency anemia, unspecified, Myocardial infarction ([address] Other and unspecified hyperlipidemia, Pure hypercholesterolemia, Type II or unspecified type diabetes mellitus without mention of complication, not stated as uncontrolled, Unspecified asthma(493.90), Unspecified venous (peripheral) insufficiency, and Unspecified vitamin D deficiency. Social History:   reports that she quit smoking about 20 years ago. She has a 2.50 pack-year smoking history. She has never used smokeless tobacco. She reports current alcohol use. She reports that she does not use drugs.      Family History:   Family History   Problem Relation Age of Onset    Diabetes Mother     Heart Disease Mother     Heart Disease Father     Diabetes Sister     High Blood Pressure Sister     Diabetes Maternal Grandmother        Vitals:  BP (!) 149/67   Pulse 129   Temp 98.8 °F (37.1 °C) (Oral)   Resp 20   Ht 4' 11\" (1.499 m)   Wt 142 lb (64.4 kg)   SpO2 97%   BMI 28.68 kg/m²   Temp (24hrs), Av.3 °F (36.8 °C), Min:97.7 °F (36.5 °C), Max:98.9 °F (37.2 °C)    Recent Labs     20  0826 20  1142 20  1636 20   POCGLU 133* 142* 108* 134*       I/O (24Hr):     Intake/Output Summary (Last 24 hours) at 2020 0952  Last data filed at 2020 0600  Gross per 24 hour   Intake 540 ml   Output --   Net 540 ml       Labs:  Hematology:  Recent Labs     20  11220  0520  0449   WBC 17.2* 13.4* 12.6*   RBC 4.81 4.39 4.37   HGB 13.7 12.7 12.7   HCT 43.6 39.9 40.9   MCV 90.7 90.8 93.6   MCH 28.6 28.9 29.1   MCHC 31.5 31.8 31.1   RDW 16.9* 17.1* 15.8*    151 166   MPV 8.9 9.8 11.3   INR 1.0 1.0  --    DDIMER 0.71*  --   --      Chemistry:  Recent Labs     20  1120 20  1455 20  0530 20  0449     --  139 138   K 3.1*  --  3.4* 3.3*   CL 99  --  103 103   CO2 22  --  23 20   GLUCOSE 157*  --  117* 166*   BUN 37*  --  30* 26*   CREATININE 0.90  --  0.73 0.78   MG 1.6  --  1.6 1.8   ANIONGAP 15  --  13 15   LABGLOM >60  --  >60 >60   GFRAA >60  --  >60 >60   CALCIUM 9.3  --  9.0 8.7   TROPHS 28* 31* 35*  --    MYOGLOBIN  --   --  137*  --      Recent Labs     20  1057 20  1120 20  0530 20  2129 20  0449 20  0826 20  1142 20  1636 20   PROT  --  6.4 5.4*  --  5.4*  --   --   --   --    LABALBU  --  2.9* 2.5*  --  2.2*  --   --   --   --    LABA1C  --   --   --   --  8.3*  --   --   --   --    TSH  --   --   --   --  1.39  --   --   --   --    AST  --  27 23  --  24  --   --   --   --    ALT  --  27 19  --  16  --   --   --   -- LDH  --  210  --   --   --   --   --   --   --    ALKPHOS  --  237* 188*  --  174*  --   --   --   --    BILITOT  --  0.60 0.46  --  0.54  --   --   --   --    CHOL  --   --   --   --  117  --   --   --   --    HDL  --   --   --   --  48  --   --   --   --    LDLCHOLESTEROL  --   --   --   --  47  --   --   --   --    CHOLHDLRATIO  --   --   --   --  2.4  --   --   --   --    TRIG  --   --   --   --  112  --   --   --   --    VLDL  --   --   --   --  NOT REPORTED  --   --   --   --    POCGLU 148*  --   --  185*  --  133* 142* 108* 134*     ABG:No results found for: POCPH, PHART, PH, POCPCO2, PMM5KBE, PCO2, POCPO2, PO2ART, PO2, POCHCO3, XQJ8UBB, HCO3, NBEA, PBEA, BEART, BE, THGBART, THB, KFX2WYD, MWOU7PLF, R1NKQMNJ, O2SAT, FIO2  Lab Results   Component Value Date/Time    SPECIAL lac 10ml in both. 07/25/2020 12:05 PM     Lab Results   Component Value Date/Time    CULTURE NO GROWTH 2 DAYS 07/25/2020 12:05 PM       Radiology:  Ct Head Wo Contrast    Result Date: 7/25/2020  No acute intracranial abnormality. Remote appearing lacunar infarction right basal ganglia. Xr Chest Portable    Result Date: 7/25/2020  Mild pulmonary vascular congestion. Stable rounded opacity projected in the region the right hilum is of uncertain etiology but could represent right pulmonary artery en face. CT chest could be obtained for confirmation. Ct Chest Pulmonary Embolism W Contrast    Result Date: 7/25/2020  1. No evidence of pulmonary embolism or acute pulmonary abnormality. No mass lesion. 2. Dilatation of the main pulmonary artery to 3.6 cm which can be seen in pulmonary arterial hypertension. 3. Partially imaged splenomegaly of uncertain etiology. Cta Head Neck W Contrast    Result Date: 7/25/2020  60% stenosis at the origin of the right internal carotid artery 30% focal stenosis at the origin of the left internal carotid artery. 50% focal stenosis in the distal P2 segment of the left PCA.  2 cm extra-axial enhancing mass along the anterior aspect of the right middle cranial fossa, likely related to a meningioma. Further evaluation with MRI brain with and without contrast is recommended. Old infarction in the right head of caudate nucleus. Mri Brain W Wo Contrast    Result Date: 7/25/2020  Probable meningioma right middle cranial fossa. Otherwise no acute disease.        Physical Examination:        General appearance:  alert, cooperative and no distress  Mental Status:  oriented to person, place and time, hard of hearing   Lungs:  clear to auscultation bilaterally  Heart:  Tachycardic, irregular   Abdomen:  soft, nontender, nondistended, normal bowel sounds  Extremities:  no edema, redness, tenderness in the calves  Skin:  no gross lesions, rashes, induration    Assessment:        Hospital Problems           Last Modified POA    * (Principal) Stroke-like episode ([address] 7/26/2020 Yes    Venous (peripheral) insufficiency 7/26/2020 Yes    Type 2 diabetes mellitus with stage 3 chronic kidney disease, without long-term current use of insulin ([address] 7/26/2020 Yes    Coronary artery disease involving native coronary artery of native heart without angina pectoris 7/26/2020 Yes    Stage 3 chronic kidney disease ([address] 7/26/2020 Yes    Bilateral hearing loss 7/26/2020 Yes    Chronic diastolic CHF (congestive heart failure) ([address] 7/26/2020 Yes    Chronic obstructive pulmonary disease ([address] 7/26/2020 Yes    Gout with tophi 7/26/2020 Yes    Essential hypertension (Chronic) 7/26/2020 Yes    Cellulitis of left lower extremity 3/21/3299 Yes    Metabolic encephalopathy 2/51/7532 Yes    Meningioma, cerebral ([address] 7/26/2020 Yes    New onset atrial fibrillation ([address] 7/28/2020 Clinically Undetermined          Plan:        - Vitals, labs, imaging, medications reviewed  - EKG this AM demonstrating A fib, no history per patient/family  - Cardiology consult  - Discussed with cardiology, start amiodarone  - Neurology following - EEG pending  - NS

## 2020-07-28 NOTE — CONSULTS
[facility] Neurology   IN-PATIENT SERVICE      NEUROLOGY PROGRESS  NOTE            Date:   7/28/2020  Patient name:  Antoinette Ariza  Date of admission:  7/26/2020  YOB: 1950      Interval History:   Antoinette Ariza is a  78 y.o. female admitted on 7/26/2020 with Stroke-like episode ([facility] [I63.9]. This is a follow-up neurology progress note. The patient was seen and examined and the chart was reviewed. Interval history  7/27/2020:    Examined the patient at the bedside, daughter present in the room. As per the daughter patient has returned to the baseline. Patient working with the physical therapy walking with a wheeled walker. Patient was found to be in A fib, cardiology consulted and Amiodarone started     Patient denies any headache, dizziness, confusion, tingling, numbness, any weakness. Patient on aspirin 81, Lipitor 40, Plavix 75. Insulin sliding scale  Meropenem as per primary  PT/OT  EEG: Awaiting    i PT/OT  EEG esvin    History of Present Illness: The patient is a 78 y.o. female with significant past medical history of HTN, CHF, decreased hearing, DM2 who presents with AMS, weakness and to to have Fever, UTI, Cellulitis, right middle cranial fossa meningioma                 As per daughter patient presented with 2 days of altered mental status, saying things incorrectly  And being confused. Patient also has bilateral knee problems and having some pain and weakness bilateral lower extremity. Patient was taken to [facility] ED where they found that the patient was having temperature of 39.7, positive for UTI and cellulitis. On CT head without contrast they noticed some old right basal ganglia infarct. CT head and neck showed 60% stenosis of the origin of the right internal carotid artery, 30% focal stenosis at the origin of the left internal carotid artery, 50% stenosis of the distal P2 segment of the left PCA.   He also noticed some 2 cm extra-axial enhancing mass on the posterior aspect of the right middle cranial fossa that could be a meningioma. Brain MRI with and without revealed the right middle cranial fossa meningioma.   Decision was to transfer patient to [facility] to be evaluated by neurosurgery due to concern of meningioma versus abscess and neurology for altered mental status        Past Medical History:     Past Medical History:   Diagnosis Date    Arthritis     Cellulitis and abscess     Cellulitis and abscess of unspecified site     CHF (congestive heart failure) (HCC)     Chronic kidney disease, unspecified     Coronary atherosclerosis of native coronary artery     Essential hypertension, malignant     Hard of hearing     Iron deficiency anemia, unspecified     Myocardial infarction ([address]     Other and unspecified hyperlipidemia     Pure hypercholesterolemia     Type II or unspecified type diabetes mellitus without mention of complication, not stated as uncontrolled     Unspecified asthma(493.90)     Unspecified venous (peripheral) insufficiency     Unspecified vitamin D deficiency         Past Surgical History:     Past Surgical History:   Procedure Laterality Date    CORONARY ARTERY BYPASS GRAFT      x 3, Dr. Dickson Bile          Medications during admission:      amiodarone  150 mg Intravenous Once    enoxaparin  40 mg Subcutaneous BID    allopurinol  300 mg Oral Daily    atorvastatin  40 mg Oral Daily    insulin lispro  0-12 Units Subcutaneous TID WC    insulin lispro  0-6 Units Subcutaneous Nightly    clopidogrel  75 mg Oral Daily    cetirizine  10 mg Oral Daily    furosemide  40 mg Oral BID    metoprolol tartrate  50 mg Oral BID    therapeutic multivitamin-minerals  1 tablet Oral Daily    sodium chloride flush  10 mL Intravenous 2 times per day    aspirin  81 mg Oral Daily    Or    aspirin  300 mg Rectal Daily    meropenem  1 g Intravenous Q12H         Physical Downgoing plantar response bilaterally. (-)Craig's sign bilaterally      Gait                  Normal station and gait. Normal Tandem, tip toes and heel walking             Diagnostics:      Laboratory Testing:  CBC:   Recent Labs     07/25/20  1120 07/26/20  0530 07/27/20  0449   WBC 17.2* 13.4* 12.6*   HGB 13.7 12.7 12.7    151 166       BMP:    Recent Labs     07/25/20  1120 07/26/20  0530 07/27/20  0449    139 138   K 3.1* 3.4* 3.3*   CL 99 103 103   CO2 22 23 20   BUN 37* 30* 26*   CREATININE 0.90 0.73 0.78   GLUCOSE 157* 117* 166*         Lab Results   Component Value Date    CHOL 117 07/27/2020    LDLCHOLESTEROL 47 07/27/2020    HDL 48 07/27/2020    TRIG 112 07/27/2020    ALT 16 07/27/2020    AST 24 07/27/2020    TSH 1.39 07/27/2020    INR 1.0 07/26/2020    LABA1C 8.3 (H) 07/27/2020    LABMICR 155 (H) 02/27/2019    [id number] 1205 03/11/2020         No results found for: PHENYTOIN, PHENYTOIN, VALPROATE, CBMZ        Imaging/Diagnostics:       1. CT head without contrast  Impression    No acute intracranial abnormality. Remote appearing lacunar infarction right basal ganglia. 2. CTA head and neck  Impression    60% stenosis at the origin of the right internal carotid artery         30% focal stenosis at the origin of the left internal carotid artery. 50% focal stenosis in the distal P2 segment of the left PCA. 2 cm extra-axial enhancing mass along the anterior aspect of the right middle    cranial fossa, likely related to a meningioma. Further evaluation with MRI    brain with and without contrast is recommended. Old infarction in the right head of caudate nucleus. 3. Brain MRI with and without contrast  Impression    Probable meningioma right middle cranial fossa.   Otherwise no acute disease.                          Impression:        Altered mental status  Right-sided meningioma  UTI  Cellulitis        Plan:     70-year-old female admitted to [facility] for the management of right-sided meningioma, UTI, cellulitis, altered mental status. EEG  PT/OT  Aspirin 81  Plavix 75  Lipitor 40  Fall precaution  Neurosurgery: Awaiting completion  Electrolyte replacement as per primary, potassium 3.3  Meropenem day 2  Rest of the treatment as per primary  Cardiology consulted for A fib    - Started on Amiodarone       Patient was staffed with the attending Dr. Devante Lee    Electronically signed by Wicho Snell MD on 7/28/2020 at 10:16 AM      Baron Waldron MD  Neurology PGY-2 Resident.    Neurology [facility]

## 2020-07-28 NOTE — PROCEDURES
[address] [facility]                          ELECTROENCEPHALOGRAM REPORT    PATIENT NAME: Mich Steel                   :        1950  MED REC NO:   [id number]                             ROOM:       4472  ACCOUNT NO:   [de-identified]                           ADMIT DATE: 2020  PROVIDER:     Roland Tellez    DATE OF EE2020    ATTENDING OF RECORD:  Marylin Nelson MD    REASON FOR STUDY:  This is a 19-year-old patient with confusion,  infection. MEDICATIONS:  Include amiodarone, potassium bicarbonate, Plavix, Zyrtec,  Lasix, Lopressor, meropenem. This is a 16-channel EEG with one EKG channel recording performed on a  patient described to be awake, drowsy, and asleep. The patient shows  delayed waking rhythms. Background activity consists of well-regulated  9 Hz activity in a 40-60 microvolt range, more prominent over the  posterior head area, showing good reactivity to eye opening and closing. Over the anterior head regions, there are 15-20 Hz activity in a 20-30  microvolt range. The record is prominent for a mild degree of  medium-amplitude 4-7 Hz activity seen throughout all head areas during  waking state. With drowsiness and sleep, there is further intrusion of  slower frequencies in a theta and lesser degree a delta band,  accompanied by vertex wave activity and sleep spindles. This record is  not lateralized or epileptiform. Hyperventilation is not performed. Photic stimulation shows no change  of the record. IMPRESSION:  This EEG shows the presence of mild diffuse slowing. This  EEG is concordant with diffuse encephalopathy. No clear lateralized or  epileptiform disturbance is seen.         Jesus Hillman    D: 2020 17:59:08       T: 2020 18:04:16     [email or web address]  Job#: [id number]     Doc#: [id number]    CC:

## 2020-07-29 LAB
EKG ATRIAL RATE: 129 BPM
EKG Q-T INTERVAL: 338 MS
EKG QRS DURATION: 84 MS
EKG QTC CALCULATION (BAZETT): 485 MS
EKG R AXIS: -2 DEGREES
EKG T AXIS: -121 DEGREES
EKG VENTRICULAR RATE: 124 BPM
GLUCOSE BLD-MCNC: 120 MG/DL (ref 65–105)
GLUCOSE BLD-MCNC: 137 MG/DL (ref 65–105)
GLUCOSE BLD-MCNC: 175 MG/DL (ref 65–105)
GLUCOSE BLD-MCNC: 196 MG/DL (ref 65–105)

## 2020-07-29 PROCEDURE — 2580000003 HC RX 258: Performed by: INTERNAL MEDICINE

## 2020-07-29 PROCEDURE — 97129 THER IVNTJ 1ST 15 MIN: CPT

## 2020-07-29 PROCEDURE — 6360000002 HC RX W HCPCS: Performed by: CLINICAL NURSE SPECIALIST

## 2020-07-29 PROCEDURE — 97130 THER IVNTJ EA ADDL 15 MIN: CPT

## 2020-07-29 PROCEDURE — 82947 ASSAY GLUCOSE BLOOD QUANT: CPT

## 2020-07-29 PROCEDURE — 97116 GAIT TRAINING THERAPY: CPT

## 2020-07-29 PROCEDURE — 97535 SELF CARE MNGMENT TRAINING: CPT

## 2020-07-29 PROCEDURE — 6370000000 HC RX 637 (ALT 250 FOR IP): Performed by: CLINICAL NURSE SPECIALIST

## 2020-07-29 PROCEDURE — 2580000003 HC RX 258: Performed by: CLINICAL NURSE SPECIALIST

## 2020-07-29 PROCEDURE — 6370000000 HC RX 637 (ALT 250 FOR IP): Performed by: INTERNAL MEDICINE

## 2020-07-29 PROCEDURE — 6360000002 HC RX W HCPCS: Performed by: STUDENT IN AN ORGANIZED HEALTH CARE EDUCATION/TRAINING PROGRAM

## 2020-07-29 PROCEDURE — 99232 SBSQ HOSP IP/OBS MODERATE 35: CPT | Performed by: PSYCHIATRY & NEUROLOGY

## 2020-07-29 PROCEDURE — 6360000002 HC RX W HCPCS: Performed by: INTERNAL MEDICINE

## 2020-07-29 PROCEDURE — 1200000000 HC SEMI PRIVATE

## 2020-07-29 PROCEDURE — 97530 THERAPEUTIC ACTIVITIES: CPT

## 2020-07-29 PROCEDURE — 99232 SBSQ HOSP IP/OBS MODERATE 35: CPT | Performed by: INTERNAL MEDICINE

## 2020-07-29 RX ADMIN — CLOPIDOGREL 75 MG: 75 TABLET, FILM COATED ORAL at 08:53

## 2020-07-29 RX ADMIN — FUROSEMIDE 40 MG: 40 TABLET ORAL at 08:53

## 2020-07-29 RX ADMIN — DESMOPRESSIN ACETATE 40 MG: 0.2 TABLET ORAL at 16:48

## 2020-07-29 RX ADMIN — Medication 81 MG: at 08:53

## 2020-07-29 RX ADMIN — ENOXAPARIN SODIUM 60 MG: 60 INJECTION SUBCUTANEOUS at 08:53

## 2020-07-29 RX ADMIN — ENOXAPARIN SODIUM 60 MG: 60 INJECTION SUBCUTANEOUS at 20:44

## 2020-07-29 RX ADMIN — MULTIPLE VITAMINS W/ MINERALS TAB 1 TABLET: TAB at 08:53

## 2020-07-29 RX ADMIN — AMIODARONE HYDROCHLORIDE 0.5 MG/MIN: 50 INJECTION, SOLUTION INTRAVENOUS at 13:33

## 2020-07-29 RX ADMIN — METOPROLOL TARTRATE 50 MG: 50 TABLET, FILM COATED ORAL at 20:44

## 2020-07-29 RX ADMIN — CETIRIZINE HYDROCHLORIDE 10 MG: 10 TABLET ORAL at 08:53

## 2020-07-29 RX ADMIN — INSULIN LISPRO 1 UNITS: 100 INJECTION, SOLUTION INTRAVENOUS; SUBCUTANEOUS at 20:44

## 2020-07-29 RX ADMIN — FUROSEMIDE 40 MG: 40 TABLET ORAL at 20:44

## 2020-07-29 RX ADMIN — METOPROLOL TARTRATE 50 MG: 50 TABLET, FILM COATED ORAL at 08:53

## 2020-07-29 RX ADMIN — SODIUM CHLORIDE, PRESERVATIVE FREE 10 ML: 5 INJECTION INTRAVENOUS at 20:44

## 2020-07-29 RX ADMIN — ALLOPURINOL 300 MG: 300 TABLET ORAL at 08:53

## 2020-07-29 RX ADMIN — INSULIN LISPRO 2 UNITS: 100 INJECTION, SOLUTION INTRAVENOUS; SUBCUTANEOUS at 16:45

## 2020-07-29 RX ADMIN — MEROPENEM 1 G: 1 INJECTION, POWDER, FOR SOLUTION INTRAVENOUS at 04:31

## 2020-07-29 RX ADMIN — ACETAMINOPHEN 650 MG: 325 TABLET ORAL at 20:43

## 2020-07-29 ASSESSMENT — PAIN SCALES - GENERAL
PAINLEVEL_OUTOF10: 0
PAINLEVEL_OUTOF10: 1

## 2020-07-29 NOTE — PROGRESS NOTES
Les Cabello 18    Progress Note    7/29/2020    9:55 AM    Name:   Antoinette Ariza  MRN:     [id number]     [place]:      [de-identified]   Room:   [address] [place] [address] Day:  3  Admit Date:  7/26/2020 10:23 AM    PCP:   Aziza Rosa MD  Code Status:  Full Code    Subjective:     C/C:   Chief Complaint   Patient presents with    Headache     Pt is a transfer from [address] [place] [address] for neurology consult, has head mass     Interval History Status:     No issues overnight  HR still elevated  Cardiology evaluation pending  Remains on amiodarone  No other complaints       Brief History: This is a 79-year-old female who came in from [place] after she presented there with slurred speech and expressive aphasia that have been going on for 2 days. Patient also had a fall week ago without loss of consciousness. In the work-up at outlying facility patient was found to have 2 cm mass concerning for meningioma. She was transferred to our facility for further management and evaluation from neurology and neurosurgery. Review of Systems:     Constitutional:  negative for chills, fevers, sweats  Respiratory:  negative for cough, dyspnea on exertion, shortness of breath, wheezing  Cardiovascular:  negative for chest pain, chest pressure/discomfort  Gastrointestinal:  negative for abdominal pain, constipation, diarrhea, nausea, vomiting  Neurological:  negative for dizziness, headache    Medications: Allergies:     Allergies   Allergen Reactions    Aleve [Naproxen Sodium]      Chronic kidney disease stage III, CHF    Pioglitazone Other (See Comments)     Congestive heart failure    Claritin [Loratadine]     Keflex [Cephalexin]     Lisinopril      Needs clarification of contraindication       Current Meds:   Scheduled Meds:    enoxaparin  1 mg/kg Subcutaneous BID    allopurinol  300 mg Oral Daily    atorvastatin  40 mg Oral Daily    insulin lispro  0-12 Units Subcutaneous TID     insulin lispro  0-6 Units Subcutaneous Nightly    clopidogrel  75 mg Oral Daily    cetirizine  10 mg Oral Daily    furosemide  40 mg Oral BID    metoprolol tartrate  50 mg Oral BID    therapeutic multivitamin-minerals  1 tablet Oral Daily    sodium chloride flush  10 mL Intravenous 2 times per day    aspirin  81 mg Oral Daily    Or    aspirin  300 mg Rectal Daily    meropenem  1 g Intravenous Q12H     Continuous Infusions:    amiodarone 0.5 mg/min (07/28/20 2036)    dextrose       PRN Meds: glucose, dextrose, glucagon (rDNA), dextrose, potassium chloride **OR** potassium alternative oral replacement **OR** potassium chloride, albuterol sulfate HFA, sodium chloride flush, magnesium hydroxide, promethazine **OR** ondansetron, acetaminophen, labetalol, hydrALAZINE    Data:     Past Medical History:   has a past medical history of Arthritis, Cellulitis and abscess, Cellulitis and abscess of unspecified site, CHF (congestive heart failure) ([address] Chronic kidney disease, unspecified, Coronary atherosclerosis of native coronary artery, Essential hypertension, malignant, Hard of hearing, Iron deficiency anemia, unspecified, Myocardial infarction ([address] Other and unspecified hyperlipidemia, Pure hypercholesterolemia, Type II or unspecified type diabetes mellitus without mention of complication, not stated as uncontrolled, Unspecified asthma(493.90), Unspecified venous (peripheral) insufficiency, and Unspecified vitamin D deficiency. Social History:   reports that she quit smoking about 20 years ago. She has a 2.50 pack-year smoking history. She has never used smokeless tobacco. She reports current alcohol use. She reports that she does not use drugs.      Family History:   Family History   Problem Relation Age of Onset    Diabetes Mother     Heart Disease Mother     Heart Disease Father     Diabetes Sister     High Blood Pressure Sister     Diabetes Maternal Grandmother Vitals:  /68   Pulse 100   Temp 97.6 °F (36.4 °C) (Oral)   Resp 20   Ht 4' 11.02\" (1.499 m)   Wt 141 lb 15.6 oz (64.4 kg)   SpO2 98%   BMI 28.66 kg/m²   Temp (24hrs), Av.8 °F (36.6 °C), Min:97.3 °F (36.3 °C), Max:98.5 °F (36.9 °C)    Recent Labs     20  0755   POCGLU 172* 138* 182* 120*       I/O (24Hr): No intake or output data in the 24 hours ending 20 09    Labs:  Hematology:  Recent Labs     20   WBC 12.6*   RBC 4.37   HGB 12.7   HCT 40.9   MCV 93.6   MCH 29.1   MCHC 31.1   RDW 15.8*      MPV 11.3     Chemistry:  Recent Labs     20      K 3.3*      CO2 20   GLUCOSE 166*   BUN 26*   CREATININE 0.78   MG 1.8   ANIONGAP 15   LABGLOM >60   GFRAA >60   CALCIUM 8.7     Recent Labs     20  0755   PROT 5.4*  --   --   --   --   --   --   --    LABALBU 2.2*  --   --   --   --   --   --   --    LABA1C 8.3*  --   --   --   --   --   --   --    TSH 1.39  --   --   --   --   --   --   --    AST 24  --   --   --   --   --   --   --    ALT 16  --   --   --   --   --   --   --    ALKPHOS 174*  --   --   --   --   --   --   --    BILITOT 0.54  --   --   --   --   --   --   --    CHOL 117  --   --   --   --   --   --   --    HDL 48  --   --   --   --   --   --   --    LDLCHOLESTEROL 47  --   --   --   --   --   --   --    CHOLHDLRATIO 2.4  --   --   --   --   --   --   --    TRIG 112  --   --   --   --   --   --   --    VLDL NOT REPORTED  --   --   --   --   --   --   --    POCGLU  --    < > 134* 135* 172* 138* 182* 120*    < > = values in this interval not displayed.      ABG:No results found for: POCPH, PHART, PH, POCPCO2, NQE9KTM, PCO2, POCPO2, PO2ART, PO2, POCHCO3, VBI0TLI, HCO3, NBEA, PBEA, BEART, BE, THGBART, THB, XER6NZA, IRCL8PFO, E8BGNWTO, O2SAT, FIO2  Lab Results   Component Value Date/Time    SPECIAL lac 10ml in both. 07/25/2020 12:05 PM     Lab Results   Component Value Date/Time    CULTURE NO GROWTH 4 DAYS 07/25/2020 12:05 PM       Radiology:  Ct Head Wo Contrast    Result Date: 7/25/2020  No acute intracranial abnormality. Remote appearing lacunar infarction right basal ganglia. Xr Chest Portable    Result Date: 7/25/2020  Mild pulmonary vascular congestion. Stable rounded opacity projected in the region the right hilum is of uncertain etiology but could represent right pulmonary artery en face. CT chest could be obtained for confirmation. Ct Chest Pulmonary Embolism W Contrast    Result Date: 7/25/2020  1. No evidence of pulmonary embolism or acute pulmonary abnormality. No mass lesion. 2. Dilatation of the main pulmonary artery to 3.6 cm which can be seen in pulmonary arterial hypertension. 3. Partially imaged splenomegaly of uncertain etiology. Cta Head Neck W Contrast    Result Date: 7/25/2020  60% stenosis at the origin of the right internal carotid artery 30% focal stenosis at the origin of the left internal carotid artery. 50% focal stenosis in the distal P2 segment of the left PCA. 2 cm extra-axial enhancing mass along the anterior aspect of the right middle cranial fossa, likely related to a meningioma. Further evaluation with MRI brain with and without contrast is recommended. Old infarction in the right head of caudate nucleus. Mri Brain W Wo Contrast    Result Date: 7/25/2020  Probable meningioma right middle cranial fossa. Otherwise no acute disease.        Physical Examination:        General appearance:  alert, cooperative and no distress  Mental Status:  oriented to person, place and time, hard of hearing   Lungs:  clear to auscultation bilaterally  Heart:  Tachycardic, irregular   Abdomen:  soft, nontender, nondistended, normal bowel sounds  Extremities:  no edema, redness, tenderness in the calves  Skin:  no gross lesions, rashes, induration    Assessment:        [facility] Problems           Last Modified POA    * (Principal) Stroke-like episode ([facility] 7/26/2020 Yes    Venous (peripheral) insufficiency 7/26/2020 Yes    Type 2 diabetes mellitus with stage 3 chronic kidney disease, without long-term current use of insulin ([address] 7/26/2020 Yes    Coronary artery disease involving native coronary artery of native heart without angina pectoris 7/26/2020 Yes    Stage 3 chronic kidney disease ([facility] 7/26/2020 Yes    Bilateral hearing loss 7/26/2020 Yes    Chronic diastolic CHF (congestive heart failure) ([facility] 7/26/2020 Yes    Chronic obstructive pulmonary disease ([facility] 7/26/2020 Yes    Gout with tophi 7/26/2020 Yes    Essential hypertension (Chronic) 7/26/2020 Yes    Cellulitis of left lower extremity 0/92/1924 Yes    Metabolic encephalopathy 9/31/2660 Yes    Meningioma, cerebral ([facility] 7/26/2020 Yes    New onset atrial fibrillation ([facility] 7/28/2020 Clinically Undetermined          Plan:        - Vitals, labs, imaging, medications reviewed  - Cardiology consulted for a fib - started on amiodarone  - Neurology following - EEG pending  - NS consulted - no intervention for meningioma, follow up MRI in 6 months  - Further stroke workup per neurology  - Further A fib medication adjustments per cardiology   - Continue lovenox  - DC IV meropenem - cultures negative   - PT/OT evaluation   - DC planning when HR controlled and cleared by all services       Milagro Pisano MD  7/29/2020  9:55 AM

## 2020-07-29 NOTE — CARE COORDINATION
Met with patient and daughter regarding transition planning. Pt states she will return home with her son. Pt will need a rolling walker and would like to use [facility].

## 2020-07-29 NOTE — PROGRESS NOTES
Occupational Therapy  Facility/Department: [facility] NEURO  Daily Treatment Note  NAME: Duran Mccloud  :   MRN: [id number]    Date of Service: 2020    Discharge Recommendations:  Patient would benefit from continued therapy after discharge  OT Equipment Recommendations  Equipment Needed: Yes  Mobility Devices: ADL Assistive Devices  ADL Assistive Devices: Transfer Tub Bench;Reacher;Sock-Aid Hard;Grab Bars - shower;Grab Bars - toilet    Assessment   Performance deficits / Impairments: Decreased functional mobility ; Decreased ADL status; Decreased high-level IADLs;Decreased safe awareness;Decreased endurance  Assessment: Pt would benefit from further skilled OT services to address decreased functional mobility, ADL performance, and endurance. Prognosis: Good  Decision Making: Medium Complexity  Patient Education: OT role, OT POC, benefit of using walker- good return noted this date. REQUIRES OT FOLLOW UP: Yes  Activity Tolerance  Activity Tolerance: Patient Tolerated treatment well  Safety Devices  Safety Devices in place: Yes  Type of devices: Call light within reach;Nurse notified;Gait belt;Left in bed  Restraints  Initially in place: No       Patient Diagnosis(es): The primary encounter diagnosis was Brain mass. A diagnosis of Lacunar infarction [facility]) was also pertinent to this visit.       has a past medical history of Arthritis, Cellulitis and abscess, Cellulitis and abscess of unspecified site, CHF (congestive heart failure) ([address] Chronic kidney disease, unspecified, Coronary atherosclerosis of native coronary artery, Essential hypertension, malignant, Hard of hearing, Iron deficiency anemia, unspecified, Myocardial infarction ([address] Other and unspecified hyperlipidemia, Pure hypercholesterolemia, Type II or unspecified type diabetes mellitus without mention of complication, not stated as uncontrolled, Unspecified asthma(493.90), Unspecified venous (peripheral) insufficiency, and Unspecified vitamin D deficiency. has a past surgical history that includes Tonsillectomy and adenoidectomy and Coronary artery bypass graft. Restrictions  Restrictions/Precautions  Restrictions/Precautions: Up as Tolerated  Required Braces or Orthoses?: No  Subjective   General  Patient assessed for rehabilitation services?: Yes  Family / Caregiver Present: Yes(daughter)  Diagnosis: Stroke-like episode  General Comment  Comments: RN ok'd for OT treatment. Pt agreeable and pleasant throughout. Vital Signs  Patient Currently in Pain: Denies      Orientation  Orientation  Overall Orientation Status: Within Functional Limits     Objective    ADL  Grooming: Setup;Stand by assistance(to wash face, mod A to wash/ comb hair d/t tangles from EEG gel)  UE Bathing: Setup; Increased time to complete;Stand by assistance;Minimal assistance(to wash back, pt able to complete BUE and chest seated on toilet)  LE Bathing: Setup; Increased time to complete;Contact guard assistance(to wash BLE and feet seated on toilet)  UE Dressing: Setup; Increased time to complete;Stand by assistance(to don/ doff gown seated on toilet)  LE Dressing: Setup; Increased time to complete;Contact guard assistance(to don/ doff socks seated on toilet)  Toileting: Setup; Increased time to complete;Minimal assistance(pericare following toileting)  Additional Comments: Pt supine in bed on arrival. Pt completed bed mobility and sat at EOB. Pt assisted to complete sit > stand transfer and functional mobility to bathroom. Pt transferred to toilet, assisted to complete ADL activities as documented above. Pt assisted to complete 2x transfers on/ off toilet for ADL activities, assisted to return to bed, call light in reach and RN notified on therapist exit. daughter present for treatment.      Balance  Sitting Balance: Stand by assistance(seated on EOB, toilet)  Standing Balance: Contact guard assistance(use of RW)  Standing Balance  Time: 5 min  Activity: sit <> stand transfer, functional mobility into bathroom, standing for ADLs  Comment: Pt demo no LOB. Increased time and effort to complete activities, cues for hand placement on RW  Functional Mobility  Functional - Mobility Device: Rolling Walker  Activity: To/from bathroom  Assist Level: Contact guard assistance  Functional Mobility Comments: Functional mobility <> bed to bathroom. Pt required VCs for hand placement during transfers 1x, good return    Toilet Transfers  Toilet - Technique: Ambulating  Equipment Used: Standard toilet  Toilet Transfer: Minimal assistance  Toilet Transfers Comments: on initial attempt, subsequent attempts CGA    Bed mobility  Supine to Sit: Contact guard assistance  Sit to Supine: Moderate assistance  Scooting: Stand by assistance  Comment: HOB raised, increased time and effort with use of bed rails. Pt required mod A to advance BLE to return to bed     Transfers  Stand Step Transfers: Contact guard assistance  Sit to stand: Contact guard assistance  Stand to sit: Contact guard assistance  Transfer Comments: Increased time noted to complete sit to stand/stand to sit transfer.      Cognition  Overall Cognitive Status: WFL     Perception  Overall Perceptual Status: WFL     Plan   Plan  Times per week: 2-3x/wk  Current Treatment Recommendations: Self-Care / ADL, Functional Mobility Training, Endurance Training, Patient/Caregiver Education & Training, Safety Education & Training    Goals  Short term goals  Time Frame for Short term goals: Pt will, by discharge  Short term goal 1: demo UB/LB ADLs with mod (I) and with AE PRN  Short term goal 2: demo functional mobility/transfers with mod (I) and LRD and good safety awareness  Short term goal 3: demo activity tolerance for 35 mins+ during ADL task  Short term goal 4: demo EC/WS tech's during ADL activity with 1 VC PRN       Therapy Time   Individual Concurrent Group Co-treatment   Time In 1050         Time Out 1146         Minutes 56         Timed Code Treatment Minutes: 64 Minutes   See above for LOF. RN reports patient is medically stable for therapy treatment this date. Chart reviewed prior to treatment and patient is agreeable for therapy. All lines intact and patient positioned comfortably at end of treatment. All patient needs addressed prior to ending therapy session.       Briana Hoang, OTR/L

## 2020-07-29 NOTE — PROGRESS NOTES
Speech Language Pathology  Speech Language Pathology  [facility]    Cognitive Treatment Note    Date: 2020  Patients Name: Antoinette Ariza  MRN: [id number]  Diagnosis:   Patient Active Problem List   Diagnosis Code    Vitamin D deficiency E55.9    Venous (peripheral) insufficiency I87.2    Type 2 diabetes mellitus with stage 3 chronic kidney disease, without long-term current use of insulin ([place]) E11.22, N18.3    Coronary artery disease involving native coronary artery of native heart without angina pectoris I25.10    Iron deficiency anemia D50.9    Stage 3 chronic kidney disease ([facility] [place]) N18.3    Colonoscopy refused Z53.20    Pneumococcal vaccine refused Z28.21    Bilateral hearing loss H91.93    Chronic diastolic CHF (congestive heart failure) [facility] I50.32    Chronic obstructive pulmonary disease ([place]) J44.9    HTN. Benign hypertension with CKD (chronic kidney disease) stage III ([facility]) I12.9, N18.3    Gout with tophi M1A. 9XX1    Hyperlipidemia with target LDL less than 70 E78.5    Dry skin dermatitis HANDS L85.3    Acute CVA (cerebrovascular accident) ([place]) I63.9    Essential hypertension I10    Stroke-like episode (HCC) I63.9    Cellulitis of left lower extremity L03. 116    Brain mass A09.42    Metabolic encephalopathy R92.37    Meningioma, cerebral (HCC) D32.0    New onset atrial fibrillation (HCC) I48.91       Pain: 0/10    Cognitive Treatment    Treatment time: 1:18-1:42    Subjective: [x] Alert [x] Cooperative     [] Confused     [] Agitated    [] Lethargic    Objective/Assessment: Pt [place], which may interfere with performance     Recall:  reviewed compensatory strategies to aid in recall with pt. Pt utilized recall strategies of mental imagery, association, and personal meaning to aid in recall this date.      Delayed recall, 3 units (associated): 3/3 x1 independently (10-minute delay)   Memory and mental manipulation, rankin/10 increased to 10/10 with multiple repetitions   Word list retention, category inclusion: 2/5 increased to 5/5 with min verbal cues and multiple repetitions     Plan:  [x] Continue ST services    [] Discharge from ST:      Discharge recommendations: Further therapy recommended at discharge.      Treatment completed by: Villa Kinsey M.S., CF-SLP

## 2020-07-29 NOTE — PROGRESS NOTES
Physical Therapy  Facility/Department: [facility] NEURO  Daily Treatment Note  NAME: Nate Girard  :   MRN: [id number]    Date of Service: 2020    Discharge Recommendations:  Patient would benefit from continued therapy after discharge   PT Equipment Recommendations  Equipment Needed: Yes  Mobility Devices: Jorge Yoana: Rolling    Assessment   Body structures, Functions, Activity limitations: Decreased functional mobility ; Decreased balance;Decreased strength  Assessment: Pt ambulated 200ft w/ RW CGA, no LOB noted throughout. Pt would benefit from continued skilled physical therapy to address high level balance and stair negotiation to return pt to prior level of independence. Specific instructions for Next Treatment: Stair negotiation  Prognosis: Good  Decision Making: Medium Complexity  PT Education: Plan of Care;General Safety;Goals;Gait Training  REQUIRES PT FOLLOW UP: Yes  Activity Tolerance  Activity Tolerance: Patient Tolerated treatment well     Patient Diagnosis(es): The primary encounter diagnosis was Brain mass. A diagnosis of Lacunar infarction [facility]) was also pertinent to this visit. has a past medical history of Arthritis, Cellulitis and abscess, Cellulitis and abscess of unspecified site, CHF (congestive heart failure) ([address] Chronic kidney disease, unspecified, Coronary atherosclerosis of native coronary artery, Essential hypertension, malignant, Hard of hearing, Iron deficiency anemia, unspecified, Myocardial infarction ([address] Other and unspecified hyperlipidemia, Pure hypercholesterolemia, Type II or unspecified type diabetes mellitus without mention of complication, not stated as uncontrolled, Unspecified asthma(493.90), Unspecified venous (peripheral) insufficiency, and Unspecified vitamin D deficiency.    has a past surgical history that includes Tonsillectomy and adenoidectomy and Coronary artery bypass graft. Restrictions  Restrictions/Precautions  Restrictions/Precautions: Up as Tolerated, General Precautions  Required Braces or Orthoses?: No  Subjective   General  Response To Previous Treatment: Not applicable  Family / Caregiver Present: No  Subjective  Subjective: RN and pt in agreement for PT treatment; pt seated on bedside commode upon writer's arrival, pt pleasant and cooperative throughout  Pain Screening  Patient Currently in Pain: Denies  Vital Signs  Patient Currently in Pain: Denies       Orientation  Orientation  Overall Orientation Status: Within Functional Limits  Cognition   Cognition  Overall Cognitive Status: WFL  Objective   Bed mobility  Comment: unable to assess- pt seated on bedside commode upon writer's arrival, retired seated in bedside chair upon writer's arrival  Transfers  Sit to Stand: Contact guard assistance;Minimal Assistance  Stand to sit: Contact guard assistance  Comment: Sit to stand performed x2 from commode and bedside. Pt required Mehdi to perform STS from bedside commode, CGA from bedside. Ambulation  Ambulation?: Yes  Ambulation 1  Surface: level tile  Device: Rolling Walker  Assistance: Contact guard assistance  Quality of Gait: Significant forward flexed posture; decreased step length bilaterally  Gait Deviations: Slow Ivania;Decreased step length  Distance: 200ft  Comments: No LOB noted throughout. Stairs/Curb  Stairs?: No    Seated LE exercise program: [employer], heel/toe raises, and marches. Reps: x10  Goals  Short term goals  Time Frame for Short term goals: 14 visits  Short term goal 1: Independent bed mobility  Short term goal 2: Independent transfers  Short term goal 3:  Independent ambulation with least restricitve 300 ft  Short term goal 4: Pt to navigate 12 stairs with 1 rail SBA  Short term goal 5: Pt to tolerate 30 minutes of activity to improve safety with mobility  Patient Goals   Patient goals : GO home    Plan    Plan  Times per week: 5-6x/week  Specific instructions for Next Treatment: Stair negotiation  Current Treatment Recommendations: Transfer Training, Gait Training, Functional Mobility Training, Stair training, Safety Education & Training, Equipment Evaluation, Education, & procurement  Safety Devices  Type of devices: Gait belt, Call light within reach, Left in chair, Nurse notified  Restraints  Initially in place: No     Therapy Time   Individual Concurrent Group Co-treatment   Time In [address]         Minutes 26                 Nhi Johansen, PT

## 2020-07-29 NOTE — CONSULTS
[facility] Neurology   IN-PATIENT SERVICE      NEUROLOGY CONSULT  PROGRESS  NOTE            Date:   7/29/2020  Patient name:  Stephanie Magaña  Date of admission:  7/26/2020  YOB: 1950      Interval History:   Stephanie Magaña is a  78 y.o. female admitted on 7/26/2020 with Stroke-like episode ([facility] [address] [I63.9]. This is a follow-up neurology progress note. The patient was seen and examined and the chart was reviewed. Interval history: 7/28/2020  The patient at the bedside, daughter present in the room. As per the daughter patient has returned to the baseline, patient looking physical therapy with elbow will work-up. Cardiology following the patient for A. fib currently on amiodarone. Patient denies any headache, dizziness, confusion, tingling, numbness or any other weakness. EEG: Mild diffuse slowing. EEG concordant with diffuse encephalopathy      Interval history  7/27/2020:    Examined the patient at the bedside, daughter present in the room. Patient found to be in A. fib, cardiology consulted, amiodarone started. Next    Patient on aspirin 81, Lipitor 40, Plavix 75. Insulin sliding scale  Meropenem as per primary  PT/OT  EEG: Awaiting    i PT/OT  EEG esvin    History of Present Illness: The patient is a 78 y.o. female with significant past medical history of HTN, CHF, decreased hearing, DM2 who presents with AMS, weakness and to to have Fever, UTI, Cellulitis, right middle cranial fossa meningioma                 As per daughter patient presented with 2 days of altered mental status, saying things incorrectly  And being confused. Patient also has bilateral knee problems and having some pain and weakness bilateral lower extremity. Patient was taken to [facility] ED where they found that the patient was having temperature of 39.7, positive for UTI and cellulitis. On CT head without contrast they noticed some old right basal ganglia infarct.   CT head and neck showed 60% stenosis of the origin of the right internal carotid artery, 30% focal stenosis at the origin of the left internal carotid artery, 50% stenosis of the distal P2 segment of the left PCA. He also noticed some 2 cm extra-axial enhancing mass on the posterior aspect of the right middle cranial fossa that could be a meningioma. Brain MRI with and without revealed the right middle cranial fossa meningioma.   Decision was to transfer patient to [facility] ER to be evaluated by neurosurgery due to concern of meningioma versus abscess and neurology for altered mental status        Past Medical History:     Past Medical History:   Diagnosis Date    Arthritis     Cellulitis and abscess     Cellulitis and abscess of unspecified site     CHF (congestive heart failure) (HCC)     Chronic kidney disease, unspecified     Coronary atherosclerosis of native coronary artery     Essential hypertension, malignant     Hard of hearing     Iron deficiency anemia, unspecified     Myocardial infarction ([address]     Other and unspecified hyperlipidemia     Pure hypercholesterolemia     Type II or unspecified type diabetes mellitus without mention of complication, not stated as uncontrolled     Unspecified asthma(493.90)     Unspecified venous (peripheral) insufficiency     Unspecified vitamin D deficiency         Past Surgical History:     Past Surgical History:   Procedure Laterality Date    CORONARY ARTERY BYPASS GRAFT      x 3, Dr. Rebeca aT          Medications during admission:      enoxaparin  1 mg/kg Subcutaneous BID    allopurinol  300 mg Oral Daily    atorvastatin  40 mg Oral Daily    insulin lispro  0-12 Units Subcutaneous TID WC    insulin lispro  0-6 Units Subcutaneous Nightly    clopidogrel  75 mg Oral Daily    cetirizine  10 mg Oral Daily    furosemide  40 mg Oral BID    metoprolol tartrate  50 mg Oral BID    therapeutic multivitamin-minerals  1 tablet Oral throughout     Cerebellar Intact finger-nose-finger testing. Intact heel-shin testing. No dysdiadochokinesia present. No tremors                        Reflex function 2/4 symmetric throughout . Downgoing plantar response bilaterally. (-)Craig's sign bilaterally      Gait                  Normal station and gait. Normal Tandem, tip toes and heel walking             Diagnostics:      Laboratory Testing:  CBC:   Recent Labs     07/27/20  0449   WBC 12.6*   HGB 12.7          BMP:    Recent Labs     07/27/20  0449      K 3.3*      CO2 20   BUN 26*   CREATININE 0.78   GLUCOSE 166*         Lab Results   Component Value Date    CHOL 117 07/27/2020    LDLCHOLESTEROL 47 07/27/2020    HDL 48 07/27/2020    TRIG 112 07/27/2020    ALT 16 07/27/2020    AST 24 07/27/2020    TSH 1.39 07/27/2020    INR 1.0 07/26/2020    LABA1C 8.3 (H) 07/27/2020    LABMICR 155 (H) 02/27/2019    [id number] 1205 03/11/2020         No results found for: PHENYTOIN, PHENYTOIN, VALPROATE, CBMZ        Imaging/Diagnostics:       1. CT head without contrast  Impression    No acute intracranial abnormality. Remote appearing lacunar infarction right basal ganglia. 2. CTA head and neck  Impression    60% stenosis at the origin of the right internal carotid artery         30% focal stenosis at the origin of the left internal carotid artery. 50% focal stenosis in the distal P2 segment of the left PCA. 2 cm extra-axial enhancing mass along the anterior aspect of the right middle    cranial fossa, likely related to a meningioma. Further evaluation with MRI    brain with and without contrast is recommended. Old infarction in the right head of caudate nucleus. 3. Brain MRI with and without contrast  Impression    Probable meningioma right middle cranial fossa.   Otherwise no acute disease.                          Impression:        Altered mental status  Right-sided meningioma  UTI  Cellulitis        Plan:     76-year-old female admitted to [facility] for the management of right-sided meningioma, UTI, cellulitis, altered mental status. EEG: EEG showed presence of mild diffuse slowing, consistent with diffuse encephalopathy. No clear lateralized or epileptiform disturbance  PT/OT  Aspirin 81  Plavix 75  Lipitor 40  Fall precaution  Neurosurgery: Awaiting completion  Electrolyte replacement as per primary, potassium 3.3  Meropenem day 2  Rest of the treatment as per primary  Cardiology consulted for A fib    - Started on Amiodarone       Patient was staffed with the attending Dr. Andrew Posey    Electronically signed by Jeannette Jimenez MD on 7/29/2020 at 10:16 AM      Aydee Hui MD  Neurology PGY-2 Resident.    Neurology [facility]

## 2020-07-29 NOTE — CONSULTS
medications    Medication Sig Start Date End Date Taking? Authorizing Provider   desloratadine (CLARINEX) 5 MG tablet Take 1 tablet by mouth daily Patient needs to contact office before any further refills will be approved 6/29/20   Lyle Vogel MD   furosemide (LASIX) 40 MG tablet TAKE ONE TABLET BY MOUTH TWICE A DAY 6/8/20   Lyle Vogel MD   metFORMIN (GLUCOPHAGE) 500 MG tablet Take 1 tablet by mouth 2 times daily (with meals) 3/3/20   Lyle Vogel MD   metoprolol tartrate (LOPRESSOR) 50 MG tablet Take 1 tablet by mouth 2 times daily 3/3/20   Lyle Vogel MD   Multiple Vitamins-Minerals (THERAPEUTIC MULTIVITAMIN-MINERALS) tablet Take 1 tablet by mouth daily    Historical Provider, MD   atorvastatin (LIPITOR) 40 MG tablet Take 1 tablet by mouth daily ** stop simvastatin& fenofibrate ** 2/20/20   Lyle Vogel MD   mineral oil-hydrophilic petrolatum (HYDROPHOR) ointment Apply topically twice a day as needed for dry skin on the hands and forearms 2/20/20   Lyle Vogel MD   allopurinol (ZYLOPRIM) 300 MG tablet Take 1 tablet by mouth daily Per rheumatologist 2/20/20   Lyle Vogel MD   diclofenac sodium 1 % GEL APPLY 4 GRAMS TOPICALLY THREE TIMES DAILY FOR 30 DAYS 12/19/19   Historical Provider, MD   albuterol sulfate HFA (PROVENTIL HFA) 108 (90 Base) MCG/ACT inhaler Inhale 1-2 puffs into the lungs every 4 hours as needed for Wheezing 1/7/20   Rowena Vera MD   Blood Pressure KIT 1 kit by Does not apply route three times daily 12/19/19   Pavan Nelson MD   FREESTYLE LANCETS MISC 1 each by Does not apply route daily Patient needs to contact office before any further refills will be approved 12/18/19   Pavan Nelson MD   blood glucose test strips (FREESTYLE LITE) strip 1 each by In Vitro route daily As needed.  12/18/19   Pavan Nelson MD   Blood Pressure KIT 1 each by Does not apply route daily 12/18/19   Pavan Nelson MD   clobetasol (TEMOVATE) 0.05 % ointment Apply topically 2 times daily. For 7days 19   Marge Flynn MD   blood glucose monitor kit and supplies 1 kit by Other route three times daily Dispense Butterfly Elite CBG Device 19   Neo Lewis MD   clopidogrel (PLAVIX) 75 MG tablet Take 1 tablet by mouth daily 19   Navi Kunz MD   aspirin EC 81 MG EC tablet Take 1 tablet by mouth daily. 14   Neo Lweis MD   ammonium lactate (AMLACTIN) 12 % cream Apply 2 g topically as needed for Dry Skin. Apply topically as needed.     Historical Provider, MD      Current Facility-Administered Medications: [COMPLETED] amiodarone (CORDARONE) 150 mg in dextrose 5 % 100 mL bolus, 150 mg, Intravenous, Once **FOLLOWED BY** [] amiodarone (CORDARONE) 450 mg in dextrose 5 % 250 mL infusion, 1 mg/min, Intravenous, Continuous **FOLLOWED BY** amiodarone (CORDARONE) 450 mg in dextrose 5 % 250 mL infusion, 0.5 mg/min, Intravenous, Continuous  enoxaparin (LOVENOX) injection 60 mg, 1 mg/kg, Subcutaneous, BID  glucose (GLUTOSE) 40 % oral gel 15 g, 15 g, Oral, PRN  dextrose 50 % IV solution, 12.5 g, Intravenous, PRN  glucagon (rDNA) injection 1 mg, 1 mg, Intramuscular, PRN  dextrose 5 % solution, 100 mL/hr, Intravenous, PRN  potassium chloride (KLOR-CON M) extended release tablet 40 mEq, 40 mEq, Oral, PRN **OR** potassium bicarb-citric acid (EFFER-K) effervescent tablet 40 mEq, 40 mEq, Oral, PRN **OR** potassium chloride 10 mEq/100 mL IVPB (Peripheral Line), 10 mEq, Intravenous, PRN  albuterol sulfate  (90 Base) MCG/ACT inhaler 2 puff, 2 puff, Inhalation, Q4H PRN  allopurinol (ZYLOPRIM) tablet 300 mg, 300 mg, Oral, Daily  atorvastatin (LIPITOR) tablet 40 mg, 40 mg, Oral, Daily  insulin lispro (HUMALOG) injection vial 0-12 Units, 0-12 Units, Subcutaneous, TID WC  insulin lispro (HUMALOG) injection vial 0-6 Units, 0-6 Units, Subcutaneous, Nightly  clopidogrel (PLAVIX) tablet 75 mg, 75 mg, Oral, Daily  cetirizine (ZYRTEC) tablet 10 mg, 10 mg, Oral, Daily  furosemide (LASIX) tablet 40 mg, 40 mg, Oral, BID  metoprolol tartrate (LOPRESSOR) tablet 50 mg, 50 mg, Oral, BID  therapeutic multivitamin-minerals 1 tablet, 1 tablet, Oral, Daily  sodium chloride flush 0.9 % injection 10 mL, 10 mL, Intravenous, 2 times per day  sodium chloride flush 0.9 % injection 10 mL, 10 mL, Intravenous, PRN  magnesium hydroxide (MILK OF MAGNESIA) 400 MG/5ML suspension 30 mL, 30 mL, Oral, Daily PRN  promethazine (PHENERGAN) tablet 12.5 mg, 12.5 mg, Oral, Q6H PRN **OR** ondansetron (ZOFRAN) injection 4 mg, 4 mg, Intravenous, Q6H PRN  aspirin EC tablet 81 mg, 81 mg, Oral, Daily **OR** aspirin suppository 300 mg, 300 mg, Rectal, Daily  meropenem (MERREM) 1 g in sodium chloride 0.9 % 100 mL IVPB (mini-bag), 1 g, Intravenous, Q12H  acetaminophen (TYLENOL) tablet 650 mg, 650 mg, Oral, Q6H PRN  labetalol (NORMODYNE;TRANDATE) injection syringe 10 mg, 10 mg, Intravenous, Q6H PRN  hydrALAZINE (APRESOLINE) injection 10 mg, 10 mg, Intravenous, Q6H PRN    Allergies:  Aleve [naproxen sodium]; Pioglitazone; Claritin [loratadine]; Keflex [cephalexin]; and Lisinopril    Social History:   reports that she quit smoking about 20 years ago. She has a 2.50 pack-year smoking history. She has never used smokeless tobacco. She reports current alcohol use. She reports that she does not use drugs. Family History: family history includes Diabetes in her maternal grandmother, mother, and sister; Heart Disease in her father and mother; High Blood Pressure in her sister. No h/o sudden cardiac death. No for premature CAD    REVIEW OF SYSTEMS:    · Constitutional: there has been no unanticipated weight loss. There's been No change in energy level, No change in activity level. · Eyes: No visual changes or diplopia. No scleral icterus. · ENT: No Headaches  · Cardiovascular: No cardiac history  · Respiratory: No previous pulmonary problems, No cough  · Gastrointestinal: No abdominal pain.   No change in bowel or bladder habits. · Genitourinary: No dysuria, trouble voiding, or hematuria. · Musculoskeletal:  No gait disturbance, No weakness or joint complaints. · Integumentary: No rash or pruritis. · Neurological: No headache, diplopia, change in muscle strength, numbness or tingling. No change in gait, balance, coordination, mood, affect, memory, mentation, behavior. · Psychiatric: No anxiety, or depression. · Endocrine: No temperature intolerance. No excessive thirst, fluid intake, or urination. No tremor. · Hematologic/Lymphatic: No abnormal bruising or bleeding, blood clots or swollen lymph nodes. · Allergic/Immunologic: No nasal congestion or hives. PHYSICAL EXAM:      /68   Pulse 100   Temp 97.6 °F (36.4 °C) (Oral)   Resp 20   Ht 4' 11.02\" (1.499 m)   Wt 141 lb 15.6 oz (64.4 kg)   SpO2 98%   BMI 28.66 kg/m²    Constitutional and General Appearance: alert, cooperative, no distress and appears stated age  HEENT: PERRL, no cervical lymphadenopathy. No masses palpable. Normal oral mucosa  Respiratory:  · Normal excursion and expansion without use of accessory muscles  · Resp Auscultation: Good respiratory effort. No for increased work of breathing. On auscultation: clear to auscultation bilaterally  Cardiovascular:  · The apical impulse is not displaced  · Heart tones are crisp and normal. regular S1 and S2.  · Jugular venous pulsation Normal  · The carotid upstroke is normal in amplitude and contour without delay or bruit  · Peripheral pulses are symmetrical and full   Abdomen:   · No masses or tenderness  · Bowel sounds present  Extremities:  ·  No Cyanosis or Clubbing  ·  Lower extremity edema: No  ·  Skin: Warm and dry  Neurological:  · Alert and oriented.   · Moves all extremities well  · No abnormalities of mood, affect, memory, mentation, or behavior are noted      Labs:     CBC:   Recent Labs     07/27/20  0449   WBC 12.6*   HGB 12.7   HCT 40.9        BMP:   Recent Labs     07/27/20  0449      K 3.3*   CO2 20   BUN 26*   CREATININE 0.78   LABGLOM >60   GLUCOSE 166*     BNP: No results for input(s): BNP in the last 72 hours. PT/INR: No results for input(s): PROTIME, INR in the last 72 hours. APTT:No results for input(s): APTT in the last 72 hours. CARDIAC ENZYMES:No results for input(s): CKTOTAL, CKMB, CKMBINDEX, TROPONINI in the last 72 hours. FASTING LIPID PANEL:  Lab Results   Component Value Date    HDL 48 07/27/2020    TRIG 112 07/27/2020     LIVER PROFILE:  Recent Labs     07/27/20  0449   AST 24   ALT 16   LABALBU 2.2*       DATA:    Diagnostics:    EKG: A. fib with RVR.  LVH. ECHO: EF of 69% on stress test in 2013. CATH 7/21/06:   Patent LIMA to LAD and SVG to RCA. Occluded SVG to OM 2. Occluded RCA. Mid 95% LAD stenosis. OM1 luminal irregularities, OM 2 proximal 90% stenosis,    CABG with LIMA-LAD, SVG-OM and SVG-RCA in 2003. IMPRESSION:    1.  New onset A. fib with RVR with Chads Vasc of 5. Bolused with amiodarone yesterday. Currently on metoprolol 50 twice daily. HR under control now. Continue full dose lovenox. 2. CAD S/P CABG X 3 as above. Chronically occluded RCA and SVG to OM 2.  3.  Metabolic encephalopathy likely multifactorial with left lower extremity cellulitis on meropenem. EEG showing mild diffuse slowing. 4.  Right anterior middle cranial fossa meningioma with no acute intervention as per neurosurgery. RECOMMENDATIONS:  1. Continue rate control with beta-blocker and amiodarone. 2. Transition to oral amiodarone when stable. 3. Can DC home on eliquis with outpatient follow up with Cardiology if ok with Primary. Discussed with patient and Nurse. Mitch Lance MD  [facility]         7/29/2020, 9:56 AM        Attending Physician Statement  I have discussed the care of [address] [place] [address], including pertinent history and exam findings,  with the cardiology fellow/resident.  I have seen and examined the patient and the key elements of all parts of the encounter have been performed by me. I agree with the assessment, plan and orders as documented by the resident with additional recommendations as below:       Patient with known hx of ischemic heart disease s/p CABG admitted with altered mentation, found to have new onset atrial fibrillation, started on amiodarone infusion without any response, currently in a fib with HR , no CHF On exam, recommend KARLA/CV. Detailed discussion with patient and her daughter, risks, benefits and alternatives explained along with potenatil complications, they verbalizes understanding and willing to proceed. Will schedule this afternoon. Cont amio and therapeutic lovenox. Switch to DOAC post CV. D/C asa. Thank you for allowing me to participate in the care of this patient, please do not hesitate to call with questions.           Xiomara Pool MD, [facility]

## 2020-07-30 ENCOUNTER — APPOINTMENT (OUTPATIENT)
Dept: CARDIAC CATH/INVASIVE PROCEDURES | Age: 70
DRG: 637 | End: 2020-07-30
Payer: OTHER GOVERNMENT

## 2020-07-30 LAB
ANION GAP SERPL CALCULATED.3IONS-SCNC: 12 MMOL/L (ref 9–17)
BUN BLDV-MCNC: 36 MG/DL (ref 8–23)
BUN/CREAT BLD: ABNORMAL (ref 9–20)
CALCIUM SERPL-MCNC: 9.2 MG/DL (ref 8.6–10.4)
CHLORIDE BLD-SCNC: 103 MMOL/L (ref 98–107)
CO2: 25 MMOL/L (ref 20–31)
CREAT SERPL-MCNC: 0.83 MG/DL (ref 0.5–0.9)
GFR AFRICAN AMERICAN: >60 ML/MIN
GFR NON-AFRICAN AMERICAN: >60 ML/MIN
GFR SERPL CREATININE-BSD FRML MDRD: ABNORMAL ML/MIN/{1.73_M2}
GFR SERPL CREATININE-BSD FRML MDRD: ABNORMAL ML/MIN/{1.73_M2}
GLUCOSE BLD-MCNC: 114 MG/DL (ref 70–99)
GLUCOSE BLD-MCNC: 151 MG/DL (ref 65–105)
GLUCOSE BLD-MCNC: 92 MG/DL (ref 65–105)
GLUCOSE BLD-MCNC: 98 MG/DL (ref 65–105)
HCT VFR BLD CALC: 44.6 % (ref 36.3–47.1)
HEMOGLOBIN: 14.3 G/DL (ref 11.9–15.1)
LV EF: 48 %
LV EF: 55 %
LVEF MODALITY: NORMAL
LVEF MODALITY: NORMAL
MCH RBC QN AUTO: 28.9 PG (ref 25.2–33.5)
MCHC RBC AUTO-ENTMCNC: 32.1 G/DL (ref 28.4–34.8)
MCV RBC AUTO: 90.1 FL (ref 82.6–102.9)
NRBC AUTOMATED: 0 PER 100 WBC
PDW BLD-RTO: 15.3 % (ref 11.8–14.4)
PLATELET # BLD: 229 K/UL (ref 138–453)
PMV BLD AUTO: 10.8 FL (ref 8.1–13.5)
POTASSIUM SERPL-SCNC: 3.6 MMOL/L (ref 3.7–5.3)
RBC # BLD: 4.95 M/UL (ref 3.95–5.11)
SODIUM BLD-SCNC: 140 MMOL/L (ref 135–144)
WBC # BLD: 9.7 K/UL (ref 3.5–11.3)

## 2020-07-30 PROCEDURE — 82947 ASSAY GLUCOSE BLOOD QUANT: CPT

## 2020-07-30 PROCEDURE — 99232 SBSQ HOSP IP/OBS MODERATE 35: CPT | Performed by: PSYCHIATRY & NEUROLOGY

## 2020-07-30 PROCEDURE — 6370000000 HC RX 637 (ALT 250 FOR IP): Performed by: CLINICAL NURSE SPECIALIST

## 2020-07-30 PROCEDURE — 92960 CARDIOVERSION ELECTRIC EXT: CPT | Performed by: INTERNAL MEDICINE

## 2020-07-30 PROCEDURE — 6360000002 HC RX W HCPCS

## 2020-07-30 PROCEDURE — 6360000002 HC RX W HCPCS: Performed by: STUDENT IN AN ORGANIZED HEALTH CARE EDUCATION/TRAINING PROGRAM

## 2020-07-30 PROCEDURE — 2709999900 HC NON-CHARGEABLE SUPPLY

## 2020-07-30 PROCEDURE — 2580000003 HC RX 258: Performed by: INTERNAL MEDICINE

## 2020-07-30 PROCEDURE — 36415 COLL VENOUS BLD VENIPUNCTURE: CPT

## 2020-07-30 PROCEDURE — 93312 ECHO TRANSESOPHAGEAL: CPT

## 2020-07-30 PROCEDURE — 6370000000 HC RX 637 (ALT 250 FOR IP): Performed by: INTERNAL MEDICINE

## 2020-07-30 PROCEDURE — B246ZZ4 ULTRASONOGRAPHY OF RIGHT AND LEFT HEART, TRANSESOPHAGEAL: ICD-10-PCS | Performed by: INTERNAL MEDICINE

## 2020-07-30 PROCEDURE — 1200000000 HC SEMI PRIVATE

## 2020-07-30 PROCEDURE — 2580000003 HC RX 258: Performed by: CLINICAL NURSE SPECIALIST

## 2020-07-30 PROCEDURE — 5A2204Z RESTORATION OF CARDIAC RHYTHM, SINGLE: ICD-10-PCS | Performed by: INTERNAL MEDICINE

## 2020-07-30 PROCEDURE — 6360000002 HC RX W HCPCS: Performed by: INTERNAL MEDICINE

## 2020-07-30 PROCEDURE — 99232 SBSQ HOSP IP/OBS MODERATE 35: CPT | Performed by: INTERNAL MEDICINE

## 2020-07-30 PROCEDURE — 80048 BASIC METABOLIC PNL TOTAL CA: CPT

## 2020-07-30 PROCEDURE — 85027 COMPLETE CBC AUTOMATED: CPT

## 2020-07-30 PROCEDURE — 93306 TTE W/DOPPLER COMPLETE: CPT

## 2020-07-30 PROCEDURE — 97129 THER IVNTJ 1ST 15 MIN: CPT

## 2020-07-30 PROCEDURE — 93325 DOPPLER ECHO COLOR FLOW MAPG: CPT

## 2020-07-30 RX ORDER — DOXYCYCLINE HYCLATE 100 MG
100 TABLET ORAL EVERY 12 HOURS SCHEDULED
Status: DISCONTINUED | OUTPATIENT
Start: 2020-07-30 | End: 2020-07-31 | Stop reason: HOSPADM

## 2020-07-30 RX ADMIN — ACETAMINOPHEN 650 MG: 325 TABLET ORAL at 09:02

## 2020-07-30 RX ADMIN — ALLOPURINOL 300 MG: 300 TABLET ORAL at 09:02

## 2020-07-30 RX ADMIN — DOXYCYCLINE HYCLATE 100 MG: 100 TABLET, COATED ORAL at 13:59

## 2020-07-30 RX ADMIN — Medication 81 MG: at 09:02

## 2020-07-30 RX ADMIN — METOPROLOL TARTRATE 50 MG: 50 TABLET, FILM COATED ORAL at 22:19

## 2020-07-30 RX ADMIN — SODIUM CHLORIDE, PRESERVATIVE FREE 10 ML: 5 INJECTION INTRAVENOUS at 09:05

## 2020-07-30 RX ADMIN — DOXYCYCLINE HYCLATE 100 MG: 100 TABLET, COATED ORAL at 22:50

## 2020-07-30 RX ADMIN — ACETAMINOPHEN 650 MG: 325 TABLET ORAL at 22:39

## 2020-07-30 RX ADMIN — DESMOPRESSIN ACETATE 40 MG: 0.2 TABLET ORAL at 18:41

## 2020-07-30 RX ADMIN — MULTIPLE VITAMINS W/ MINERALS TAB 1 TABLET: TAB at 09:02

## 2020-07-30 RX ADMIN — SODIUM CHLORIDE, PRESERVATIVE FREE 10 ML: 5 INJECTION INTRAVENOUS at 22:21

## 2020-07-30 RX ADMIN — ACETAMINOPHEN 650 MG: 325 TABLET ORAL at 02:47

## 2020-07-30 RX ADMIN — ENOXAPARIN SODIUM 60 MG: 60 INJECTION SUBCUTANEOUS at 09:03

## 2020-07-30 RX ADMIN — AMIODARONE HYDROCHLORIDE 0.5 MG/MIN: 50 INJECTION, SOLUTION INTRAVENOUS at 09:03

## 2020-07-30 RX ADMIN — ENOXAPARIN SODIUM 60 MG: 60 INJECTION SUBCUTANEOUS at 22:19

## 2020-07-30 RX ADMIN — FUROSEMIDE 40 MG: 40 TABLET ORAL at 09:02

## 2020-07-30 RX ADMIN — INSULIN LISPRO 1 UNITS: 100 INJECTION, SOLUTION INTRAVENOUS; SUBCUTANEOUS at 22:20

## 2020-07-30 RX ADMIN — FUROSEMIDE 40 MG: 40 TABLET ORAL at 22:19

## 2020-07-30 RX ADMIN — METOPROLOL TARTRATE 50 MG: 50 TABLET, FILM COATED ORAL at 09:02

## 2020-07-30 RX ADMIN — CLOPIDOGREL 75 MG: 75 TABLET, FILM COATED ORAL at 09:02

## 2020-07-30 RX ADMIN — CETIRIZINE HYDROCHLORIDE 10 MG: 10 TABLET ORAL at 09:02

## 2020-07-30 ASSESSMENT — PAIN SCALES - GENERAL
PAINLEVEL_OUTOF10: 0
PAINLEVEL_OUTOF10: 0
PAINLEVEL_OUTOF10: 3
PAINLEVEL_OUTOF10: 0
PAINLEVEL_OUTOF10: 1
PAINLEVEL_OUTOF10: 3
PAINLEVEL_OUTOF10: 0
PAINLEVEL_OUTOF10: 0

## 2020-07-30 NOTE — CONSULTS
[facility] Neurology   IN-PATIENT SERVICE      NEUROLOGY CONSULT  PROGRESS  NOTE            Date:   7/30/2020  Patient name:  Lewayne Gilford  Date of admission:  7/26/2020  YOB: 1950      Interval History:   Lewayne Gilford is a  78 y.o. female admitted on 7/26/2020 with Stroke-like episode ([address] [I63.9]. This is a follow-up neurology progress note. The patient was seen and examined and the chart was reviewed. 7/30/2020:   Examined patient at the bedside, patient sitting in the chair having stable work-up. Patient's daughter present in the room. Patient currently on amiodarone drip, going for cardiac cardioversion this afternoon around 3 PM.  Patient denies any headache, dizziness, confusion, tingling, numbness or any new weakness. Interval history: 7/28/2020  The patient at the bedside, daughter present in the room. As per the daughter patient has returned to the baseline, patient looking physical therapy with elbow will work-up. Cardiology following the patient for A. fib currently on amiodarone. Patient denies any headache, dizziness, confusion, tingling, numbness or any other weakness. EEG: Mild diffuse slowing. EEG concordant with diffuse encephalopathy      Interval history  7/27/2020:    Examined the patient at the bedside, daughter present in the room. Patient found to be in A. fib, cardiology consulted, amiodarone started. Next    Patient on aspirin 81, Lipitor 40, Plavix 75. Insulin sliding scale  Meropenem as per primary  PT/OT  EEG: Awaiting    i PT/OT  EEG esvin    History of Present Illness: The patient is a 78 y.o. female with significant past medical history of HTN, CHF, decreased hearing, DM2 who presents with AMS, weakness and to to have Fever, UTI, Cellulitis, right middle cranial fossa meningioma                 As per daughter patient presented with 2 days of altered mental status, saying things incorrectly  And being confused.   Patient also has bilateral knee problems and having some pain and weakness bilateral lower extremity. Patient was taken to [facility] ED where they found that the patient was having temperature of 39.7, positive for UTI and cellulitis. On CT head without contrast they noticed some old right basal ganglia infarct. CT head and neck showed 60% stenosis of the origin of the right internal carotid artery, 30% focal stenosis at the origin of the left internal carotid artery, 50% stenosis of the distal P2 segment of the left PCA. He also noticed some 2 cm extra-axial enhancing mass on the posterior aspect of the right middle cranial fossa that could be a meningioma. Brain MRI with and without revealed the right middle cranial fossa meningioma.   Decision was to transfer patient to [facility] to be evaluated by neurosurgery due to concern of meningioma versus abscess and neurology for altered mental status        Past Medical History:     Past Medical History:   Diagnosis Date    Arthritis     Cellulitis and abscess     Cellulitis and abscess of unspecified site     CHF (congestive heart failure) (HCC)     Chronic kidney disease, unspecified     Coronary atherosclerosis of native coronary artery     Essential hypertension, malignant     Hard of hearing     Iron deficiency anemia, unspecified     Myocardial infarction ([address]     Other and unspecified hyperlipidemia     Pure hypercholesterolemia     Type II or unspecified type diabetes mellitus without mention of complication, not stated as uncontrolled     Unspecified asthma(493.90)     Unspecified venous (peripheral) insufficiency     Unspecified vitamin D deficiency         Past Surgical History:     Past Surgical History:   Procedure Laterality Date    CORONARY ARTERY BYPASS GRAFT      x 3, Dr. Mary Delacruz          Medications during admission:      doxycycline hyclate  100 mg Oral 2 times per day    enoxaparin  1 mg/kg Subcutaneous BID    allopurinol  300 mg Oral Daily    atorvastatin  40 mg Oral Daily    insulin lispro  0-12 Units Subcutaneous TID WC    insulin lispro  0-6 Units Subcutaneous Nightly    clopidogrel  75 mg Oral Daily    cetirizine  10 mg Oral Daily    furosemide  40 mg Oral BID    metoprolol tartrate  50 mg Oral BID    therapeutic multivitamin-minerals  1 tablet Oral Daily    sodium chloride flush  10 mL Intravenous 2 times per day         Physical Exam:   BP (!) 168/82   Pulse 105   Temp 97.3 °F (36.3 °C) (Oral)   Resp 26   Ht 4' 11.02\" (1.499 m)   Wt 141 lb 15.6 oz (64.4 kg)   SpO2 98%   BMI 28.66 kg/m²   Temp (24hrs), Av.2 °F (36.8 °C), Min:97.3 °F (36.3 °C), Max:98.9 °F (37.2 °C)        General examination:      General Appearance:  alert, well appearing, and in no acute distress  HEENT: Normocephalic, atraumatic, moist mucus membranes  Neck: supple, no carotid bruits, (-) nuchal rigidity  Lungs:  Respirations unlabored, chest wall no deformity, BS normal  Cardiovascular: normal rate, regular rhythm  Abdomen: Soft, nontender, nondistended, normal bowel sounds  Skin: No gross lesions, rashes, bruising or bleeding on exposed skin area  Extremities:  peripheral pulses palpable, clubbing or edema  Psych: normal affect      Neurological examination:      Mental status   Alert and oriented x 3; following all commands;   speech is fluent, no dysarthria, aphasia.       Cranial nerves   II - visual fields intact to confrontation; pupils reactive  III, IV, VI - extraocular muscles intact; no SIERRA; no nystagmus; no ptosis   V - normal facial sensation                                                               VII - normal facial symmetry                                                             VIII - intact hearing                                                                             IX, X - symmetrical palate elevation                                               XI - symmetrical shoulder shrug                                                       XII - midline tongue without atrophy or fasciculation     Motor function  Strength:   5/5 RUE, 5/5 RLE  5/5 LUE, 5/5  LLE  Normal bulk and tone. Sensory function Intact to touch, pin, vibration, proprioception throughout     Cerebellar Intact finger-nose-finger testing. Intact heel-shin testing. No dysdiadochokinesia present. No tremors                        Reflex function 2/4 symmetric throughout . Downgoing plantar response bilaterally. (-)Craig's sign bilaterally      Gait                  Normal station and gait. Normal Tandem, tip toes and heel walking             Diagnostics:      Laboratory Testing:  CBC:   Recent Labs     07/30/20  0845   WBC 9.7   HGB 14.3          BMP:    Recent Labs     07/30/20  0845      K 3.6*      CO2 25   BUN 36*   CREATININE 0.83   GLUCOSE 114*         Lab Results   Component Value Date    CHOL 117 07/27/2020    LDLCHOLESTEROL 47 07/27/2020    HDL 48 07/27/2020    TRIG 112 07/27/2020    ALT 16 07/27/2020    AST 24 07/27/2020    TSH 1.39 07/27/2020    INR 1.0 07/26/2020    LABA1C 8.3 (H) 07/27/2020    LABMICR 155 (H) 02/27/2019    LCQMIBYB01 1205 03/11/2020         No results found for: PHENYTOIN, PHENYTOIN, VALPROATE, CBMZ        Imaging/Diagnostics:       1. CT head without contrast  Impression    No acute intracranial abnormality. Remote appearing lacunar infarction right basal ganglia. 2. CTA head and neck  Impression    60% stenosis at the origin of the right internal carotid artery         30% focal stenosis at the origin of the left internal carotid artery. 50% focal stenosis in the distal P2 segment of the left PCA. 2 cm extra-axial enhancing mass along the anterior aspect of the right middle    cranial fossa, likely related to a meningioma. Further evaluation with MRI    brain with and without contrast is recommended.          Old infarction in the right head of caudate nucleus. 3. Brain MRI with and without contrast  Impression    Probable meningioma right middle cranial fossa. Otherwise no acute disease.                          Impression:        Altered mental status  Right-sided meningioma  UTI  Cellulitis        Plan:     65-year-old female admitted to [facility] for the management of right-sided meningioma, UTI, cellulitis, altered mental status. EEG: EEG showed presence of mild diffuse slowing, consistent with diffuse encephalopathy. No clear lateralized or epileptiform disturbance  PT/OT  Aspirin 81  Plavix 75  Lipitor 40  Fall precaution  Neurosurgery:  Electrolyte replacement as per primary, potassium 3.3  Rest of the treatment as per primary  Cardiology consulted for A fib    - Started on Amiodarone    -Cardioversion today afternoon 3 PM    Patient was staffed with the attending Dr. Hazel Ayers    Electronically signed by Jayden Saleem MD on 7/30/2020 at [address], MD  Neurology PGY-2 Resident.    Neurology [facility]

## 2020-07-30 NOTE — PLAN OF CARE
Problem: Falls - Risk of:  Goal: Will remain free from falls  Description: Will remain free from falls  Outcome: Ongoing  Goal: Absence of physical injury  Description: Absence of physical injury  Outcome: Ongoing   Patient remained free from injury. Patient verbalized understanding of need for the safety precautions. Demonstrates proper use of assistive devices. Bed remains in the lowest position. Call light remains within reach. [employer] [place] [employer] in use. Problem: Skin Integrity:  Goal: Will show no infection signs and symptoms  Description: Will show no infection signs and symptoms  Outcome: Ongoing  Goal: Absence of new skin breakdown  Description: Absence of new skin breakdown  Outcome: Ongoing     Problem: Pain:  Goal: Pain level will decrease  Description: Pain level will decrease  Outcome: Ongoing  Goal: Control of acute pain  Description: Control of acute pain  Outcome: Ongoing  Goal: Control of chronic pain  Description: Control of chronic pain  Outcome: Ongoing   Pain level assessment complete. Pt rated pain at 1/10. Pt educated on pain scale and control interventions. PRN pain medication given per pt request. Pt instructed to call out with new onset of pain or unrelieved pain. Will continue to monitor. Nic Galvez .Monroe Chou RN

## 2020-07-30 NOTE — PROGRESS NOTES
[address]    Progress Note    7/30/2020    9:41 AM    Name:   Nate Girard  MRN:     [id number]     [place]:      [de-identified]   Room:   [address] [place] [address]  IP Day:  4  Admit Date:  7/26/2020 10:23 AM    PCP:   Julianne Thomson MD  Code Status:  Full Code    Subjective:     C/C:   Chief Complaint   Patient presents with    Headache     Pt is a transfer from [facility] for neurology consult, has head mass     Interval History Status:     No issues overnight  HR improved  Cardiology evaluation in progress  Still having erythema of RLE  Concern for venous stasis dermatitis      Brief History: This is a 76-year-old female who came in from [facility] after she presented there with slurred speech and expressive aphasia that have been going on for 2 days. Patient also had a fall week ago without loss of consciousness. In the work-up at outlying facility patient was found to have 2 cm mass concerning for meningioma. She was transferred to our facility for further management and evaluation from neurology and neurosurgery. Review of Systems:     Constitutional:  negative for chills, fevers, sweats  Respiratory:  negative for cough, dyspnea on exertion, shortness of breath, wheezing  Cardiovascular:  negative for chest pain, chest pressure/discomfort  Gastrointestinal:  negative for abdominal pain, constipation, diarrhea, nausea, vomiting  Neurological:  negative for dizziness, headache    Medications: Allergies:     Allergies   Allergen Reactions    Aleve [Naproxen Sodium]      Chronic kidney disease stage III, CHF    Pioglitazone Other (See Comments)     Congestive heart failure    Claritin [Loratadine]     Keflex [Cephalexin]     Lisinopril      Needs clarification of contraindication       Current Meds:   Scheduled Meds:    doxycycline hyclate  100 mg Oral 2 times per day    enoxaparin  1 mg/kg Subcutaneous BID    allopurinol  300 mg Oral Daily    atorvastatin  40 mg Oral Daily    insulin lispro  0-12 Units Subcutaneous TID WC    insulin lispro  0-6 Units Subcutaneous Nightly    clopidogrel  75 mg Oral Daily    cetirizine  10 mg Oral Daily    furosemide  40 mg Oral BID    metoprolol tartrate  50 mg Oral BID    therapeutic multivitamin-minerals  1 tablet Oral Daily    sodium chloride flush  10 mL Intravenous 2 times per day    aspirin  81 mg Oral Daily    Or    aspirin  300 mg Rectal Daily     Continuous Infusions:    amiodarone 0.5 mg/min (07/30/20 0903)    dextrose       PRN Meds: glucose, dextrose, glucagon (rDNA), dextrose, potassium chloride **OR** potassium alternative oral replacement **OR** potassium chloride, albuterol sulfate HFA, sodium chloride flush, magnesium hydroxide, promethazine **OR** ondansetron, acetaminophen, labetalol, hydrALAZINE    Data:     Past Medical History:   has a past medical history of Arthritis, Cellulitis and abscess, Cellulitis and abscess of unspecified site, CHF (congestive heart failure) ([facility] Chronic kidney disease, unspecified, Coronary atherosclerosis of native coronary artery, Essential hypertension, malignant, Hard of hearing, Iron deficiency anemia, unspecified, Myocardial infarction ([facility] Other and unspecified hyperlipidemia, Pure hypercholesterolemia, Type II or unspecified type diabetes mellitus without mention of complication, not stated as uncontrolled, Unspecified asthma(493.90), Unspecified venous (peripheral) insufficiency, and Unspecified vitamin D deficiency. Social History:   reports that she quit smoking about 20 years ago. She has a 2.50 pack-year smoking history. She has never used smokeless tobacco. She reports current alcohol use. She reports that she does not use drugs.      Family History:   Family History   Problem Relation Age of Onset    Diabetes Mother     Heart Disease Mother     Heart Disease Father     Diabetes Sister     High Blood Pressure Sister  Diabetes Maternal Grandmother        Vitals:  BP (!) 168/82   Pulse 105   Temp 97.3 °F (36.3 °C) (Oral)   Resp 26   Ht 4' 11.02\" (1.499 m)   Wt 141 lb 15.6 oz (64.4 kg)   SpO2 98%   BMI 28.66 kg/m²   Temp (24hrs), Av.2 °F (36.8 °C), Min:97.3 °F (36.3 °C), Max:98.9 °F (37.2 °C)    Recent Labs     20  1226 20  1544 20  0833   POCGLU 137* 196* 175* 98       I/O (24Hr): No intake or output data in the 24 hours ending 20 0941    Labs:  Hematology:  Recent Labs     20  0845   WBC 9.7   RBC 4.95   HGB 14.3   HCT 44.6   MCV 90.1   MCH 28.9   MCHC 32.1   RDW 15.3*      MPV 10.8     Chemistry:  No results for input(s): NA, K, CL, CO2, GLUCOSE, BUN, CREATININE, MG, ANIONGAP, LABGLOM, GFRAA, CALCIUM, CAION, PHOS, PSA, PROBNP, TROPHS, CKTOTAL, CKMB, CKMBINDEX, MYOGLOBIN, DIGOXIN, LACTACIDWB in the last 72 hours. Recent Labs     20  0755 20  1226 20  1544 20  0833   POCGLU 182* 120* 137* 196* 175* 98     ABG:No results found for: POCPH, PHART, PH, POCPCO2, CEK9HPY, PCO2, POCPO2, PO2ART, PO2, POCHCO3, JKL6QHY, HCO3, NBEA, PBEA, BEART, BE, THGBART, THB, ASQ7JGU, HOIN2EGJ, B7NCBIBL, O2SAT, FIO2  Lab Results   Component Value Date/Time    SPECIAL lac 10ml in both. 2020 12:05 PM     Lab Results   Component Value Date/Time    CULTURE NO GROWTH 5 DAYS 2020 12:05 PM       Radiology:  Ct Head Wo Contrast    Result Date: 2020  No acute intracranial abnormality. Remote appearing lacunar infarction right basal ganglia. Xr Chest Portable    Result Date: 2020  Mild pulmonary vascular congestion. Stable rounded opacity projected in the region the right hilum is of uncertain etiology but could represent right pulmonary artery en face. CT chest could be obtained for confirmation. Ct Chest Pulmonary Embolism W Contrast    Result Date: 2020  1.  No evidence of pulmonary embolism or acute pulmonary abnormality. No mass lesion. 2. Dilatation of the main pulmonary artery to 3.6 cm which can be seen in pulmonary arterial hypertension. 3. Partially imaged splenomegaly of uncertain etiology. Cta Head Neck W Contrast    Result Date: 7/25/2020  60% stenosis at the origin of the right internal carotid artery 30% focal stenosis at the origin of the left internal carotid artery. 50% focal stenosis in the distal P2 segment of the left PCA. 2 cm extra-axial enhancing mass along the anterior aspect of the right middle cranial fossa, likely related to a meningioma. Further evaluation with MRI brain with and without contrast is recommended. Old infarction in the right head of caudate nucleus. Mri Brain W Wo Contrast    Result Date: 7/25/2020  Probable meningioma right middle cranial fossa. Otherwise no acute disease.        Physical Examination:        General appearance:  alert, cooperative and no distress  Mental Status:  oriented to person, place and time, hard of hearing   Lungs:  clear to auscultation bilaterally  Heart:  Tachycardic, irregular   Abdomen:  soft, nontender, nondistended, normal bowel sounds  Extremities:  no edema, redness, tenderness in the calves  Skin:  no gross lesions, rashes, induration    Assessment:        Hospital Problems           Last Modified POA    * (Principal) Stroke-like episode ([address] 7/26/2020 Yes    Venous (peripheral) insufficiency 7/26/2020 Yes    Type 2 diabetes mellitus with stage 3 chronic kidney disease, without long-term current use of insulin ([address] 7/26/2020 Yes    Coronary artery disease involving native coronary artery of native heart without angina pectoris 7/26/2020 Yes    Stage 3 chronic kidney disease ([address] 7/26/2020 Yes    Bilateral hearing loss 7/26/2020 Yes    Chronic diastolic CHF (congestive heart failure) ([address] 7/26/2020 Yes    Chronic obstructive pulmonary disease ([address] 7/26/2020 Yes    Gout with tophi 7/26/2020 Yes    Essential hypertension (Chronic) 7/26/2020 Yes    Cellulitis of left lower extremity 3/27/8134 Yes    Metabolic encephalopathy 3/18/8656 Yes    Meningioma, cerebral ([facility] 7/26/2020 Yes    New onset atrial fibrillation ([facility] 7/28/2020 Clinically Undetermined          Plan:        - Vitals, labs, imaging, medications reviewed  - Cardiology consulted for a fib - started on amiodarone  - Neurology following - EEG pending  - NS consulted - no intervention for meningioma, follow up MRI in 6 months  - Further stroke workup per neurology  - Further A fib medication adjustments per cardiology   - Continue lovenox  - DC IV meropenem - cultures negative - changed to PO abx for cellulitis  - PT/OT evaluation   - Discussed with cardiology, plans for cardioversion today       Noemy Granda MD  7/30/2020  9:41 AM

## 2020-07-30 NOTE — PROCEDURES
[facility]  KARLA / Cardioversion procedure Note         Today's Date: 7/30/2020    Operators:  Primary: Moose Fields MD (Attending)   Assistant: Argentina Veras MD (Fellow) & Isaias Winters MD (CV Fellow)    Patient seen and examined. History and Physical reviewed. Labs reviewed. After informed consent was obtained with explanation of the risks and benefits, the patient was brought to Cath lab. All sedation was administered by the cardiologist. The oropharynx was pre anesthetisized with cetacaine spray. KARLA:  Structures:  LA: Dilated with spontaneous echo contrast  GURPREET: No thrombus  RA: Normal  RV:  Normal  LV: Normal in size with normal systolic function   Estimated LVEF: 55%    Aorta: Moderate atheromatous disease arch  Percardium: No pericardial effusion  Septum: No intracardiac shunt via color Doppler. No intracardiac shunt via injection of agitated saline. Valves:  Mitral Valve: Structurally normal. Mild regurgitation is identified. Aortic Valve: The aortic valve is trileaflet and opens adequately. Moderate regurgitiation is identified. Tricuspid valve: Structurally normal. No regurgitation is identified. Pulmonary valve: Normal. No significant regurgitation    No valvular vegetations or thrombus identified. Summary:   1. A KARLA was performed without complications. 2. Normal LV size with normal LVEF. 3. No thrombus or valvular vegetation identified  4. Moderate atheromatous disease noted in aortic arch    CARDIOVERSION:  After an adequate level of sedation was achieved, 200J in biphasic synchronized delivery was administered. conversion to normal sinus rhythm. The patient awoke without complications.      Electronically signed by Dayanara Schrader MD on 7/30/2020 at 4:32 PM  Fellow, [address], [place], [place]      Attending Physician Statement  I have discussed the case of Vijay Lange including pertinent history and exam findings with the resident. I have seen and examined the patient and the key elements of the encounter have been performed by me. I agree with the assessment, plan and orders as documented by the resident With changes made to the note.   I was present during entire procedure and performed all critical elements of the procedure    Electronically signed by Silvana Halsted, MD on 8/2/2020 at 8:56 PM.    [facility]      [phone]

## 2020-07-30 NOTE — PROGRESS NOTES
Patient arrived from in-house bed, consent signed, all questions answered. Pt ready for procedure. Call light to reach with side rails up 2 of 2. No one at bedside with patient.

## 2020-07-30 NOTE — PROGRESS NOTES
Speech Language Pathology  Speech Language Pathology  [facility]    Cognitive Treatment Note    Date: 7/30/2020  Patients Name: Jasmyne Hawkins  MRN: [id number]  Diagnosis:   Patient Active Problem List   Diagnosis Code    Vitamin D deficiency E55.9    Venous (peripheral) insufficiency I87.2    Type 2 diabetes mellitus with stage 3 chronic kidney disease, without long-term current use of insulin ([facility] E11.22, N18.3    Coronary artery disease involving native coronary artery of native heart without angina pectoris I25.10    Iron deficiency anemia D50.9    Stage 3 chronic kidney disease ([facility] N18.3    Colonoscopy refused Z53.20    Pneumococcal vaccine refused Z28.21    Bilateral hearing loss H91.93    Chronic diastolic CHF (congestive heart failure) ([facility]) I50.32    Chronic obstructive pulmonary disease ([facility] J44.9    HTN. Benign hypertension with CKD (chronic kidney disease) stage III ([facility]) I12.9, N18.3    Gout with tophi M1A. 9XX1    Hyperlipidemia with target LDL less than 70 E78.5    Dry skin dermatitis HANDS L85.3    Acute CVA (cerebrovascular accident) ([facility] I63.9    Essential hypertension I10    Stroke-like episode (HCC) I63.9    Cellulitis of left lower extremity L03. 116    Brain mass H69.73    Metabolic encephalopathy C69.98    Meningioma, cerebral (HCC) D32.0    New onset atrial fibrillation (HCC) I48.91       Pain: 0/10    Cognitive Treatment    Treatment time: 10:36-10:55    Subjective: [x] Alert [x] Cooperative     [] Confused     [] Agitated    [] Lethargic    Objective/Assessment: Pt [place], which may have interfered with performance     Attention: maintained throughout session    Recall:   Delayed picture recall, 4 units (unassociated): 4/4 x1 independently (12-minute delay)   Memory and mental manipulation, reverse order: 2/8 increased to 6/8 with max verbal cues and repetitions     Problem Solving/Reasoning:   Deductive reasoning, 5 words: 100% independently     Plan:  [x] Continue ST services    [] Discharge from ST:      Discharge recommendations: Further therapy recommended at discharge.     Treatment completed by: Edd Langford M.S., CF-SLP

## 2020-07-30 NOTE — PLAN OF CARE
Managed pain with PRN tylenol. Patient using call light system appropriately. Daughter has been at bedside. Undergoing cardioversion today. Will continue to monitor.

## 2020-07-31 VITALS
TEMPERATURE: 97.4 F | HEART RATE: 64 BPM | SYSTOLIC BLOOD PRESSURE: 130 MMHG | BODY MASS INDEX: 28.62 KG/M2 | WEIGHT: 141.98 LBS | DIASTOLIC BLOOD PRESSURE: 45 MMHG | HEIGHT: 59 IN | OXYGEN SATURATION: 100 % | RESPIRATION RATE: 16 BRPM

## 2020-07-31 PROBLEM — G92.8 TOXIC METABOLIC ENCEPHALOPATHY: Status: ACTIVE | Noted: 2020-07-26

## 2020-07-31 LAB
CULTURE: NORMAL
CULTURE: NORMAL
GLUCOSE BLD-MCNC: 141 MG/DL (ref 65–105)
GLUCOSE BLD-MCNC: 79 MG/DL (ref 65–105)
Lab: NORMAL
Lab: NORMAL
SPECIMEN DESCRIPTION: NORMAL
SPECIMEN DESCRIPTION: NORMAL

## 2020-07-31 PROCEDURE — 6360000002 HC RX W HCPCS: Performed by: STUDENT IN AN ORGANIZED HEALTH CARE EDUCATION/TRAINING PROGRAM

## 2020-07-31 PROCEDURE — 6370000000 HC RX 637 (ALT 250 FOR IP): Performed by: CLINICAL NURSE SPECIALIST

## 2020-07-31 PROCEDURE — 6370000000 HC RX 637 (ALT 250 FOR IP): Performed by: NURSE PRACTITIONER

## 2020-07-31 PROCEDURE — 6370000000 HC RX 637 (ALT 250 FOR IP): Performed by: INTERNAL MEDICINE

## 2020-07-31 PROCEDURE — 82947 ASSAY GLUCOSE BLOOD QUANT: CPT

## 2020-07-31 PROCEDURE — 99239 HOSP IP/OBS DSCHRG MGMT >30: CPT | Performed by: INTERNAL MEDICINE

## 2020-07-31 PROCEDURE — 93971 EXTREMITY STUDY: CPT

## 2020-07-31 PROCEDURE — 2580000003 HC RX 258: Performed by: CLINICAL NURSE SPECIALIST

## 2020-07-31 PROCEDURE — 99233 SBSQ HOSP IP/OBS HIGH 50: CPT | Performed by: PSYCHIATRY & NEUROLOGY

## 2020-07-31 RX ORDER — DOXYCYCLINE HYCLATE 100 MG
100 TABLET ORAL EVERY 12 HOURS SCHEDULED
Qty: 10 TABLET | Refills: 0 | Status: SHIPPED | OUTPATIENT
Start: 2020-07-31 | End: 2020-08-05

## 2020-07-31 RX ADMIN — APIXABAN 5 MG: 5 TABLET, FILM COATED ORAL at 14:21

## 2020-07-31 RX ADMIN — ENOXAPARIN SODIUM 60 MG: 60 INJECTION SUBCUTANEOUS at 08:51

## 2020-07-31 RX ADMIN — FUROSEMIDE 40 MG: 40 TABLET ORAL at 08:51

## 2020-07-31 RX ADMIN — ALLOPURINOL 300 MG: 300 TABLET ORAL at 08:50

## 2020-07-31 RX ADMIN — ACETAMINOPHEN 650 MG: 325 TABLET ORAL at 05:01

## 2020-07-31 RX ADMIN — MULTIPLE VITAMINS W/ MINERALS TAB 1 TABLET: TAB at 08:51

## 2020-07-31 RX ADMIN — METOPROLOL TARTRATE 50 MG: 50 TABLET, FILM COATED ORAL at 08:50

## 2020-07-31 RX ADMIN — CLOPIDOGREL 75 MG: 75 TABLET, FILM COATED ORAL at 08:50

## 2020-07-31 RX ADMIN — SODIUM CHLORIDE, PRESERVATIVE FREE 10 ML: 5 INJECTION INTRAVENOUS at 08:52

## 2020-07-31 RX ADMIN — CETIRIZINE HYDROCHLORIDE 10 MG: 10 TABLET ORAL at 08:50

## 2020-07-31 RX ADMIN — DOXYCYCLINE HYCLATE 100 MG: 100 TABLET, COATED ORAL at 08:50

## 2020-07-31 ASSESSMENT — PAIN SCALES - GENERAL: PAINLEVEL_OUTOF10: 3

## 2020-07-31 NOTE — CARE COORDINATION
[place] [facility]  Written by: Jayna Palmer RN    Patient Name: Jackie Santillan  Attending Provider: No att. providers found  Admit Date: 2020 10:23 AM  MRN: [id number]  Account: [de-identified]                     : 1950  Discharge Date: 2020      Disposition: home    Jayna Palmer RN

## 2020-07-31 NOTE — PROGRESS NOTES
Les [facility]    Progress Note    7/31/2020    9:18 AM    Name:   Sally Thomas  MRN:     [id number]     Acct:      [de-identified]   Room:   [address] [place] [address] Day:  5  Admit Date:  7/26/2020 10:23 AM    PCP:   Myna Frankel, MD  Code Status:  Full Code    Subjective:     C/C:   Chief Complaint   Patient presents with    Headache     Pt is a transfer from [facility] for neurology consult, has head mass     Interval History Status:     No issues overnight  CV yesterday  NSR today  No other complaints       Brief History: This is a 78-year-old female who came in from [place] after she presented there with slurred speech and expressive aphasia that have been going on for 2 days. Patient also had a fall week ago without loss of consciousness. [facility] found to have 2 cm mass concerning for meningioma. She was transferred to our facility for further management and evaluation from neurology and neurosurgery. Review of Systems:     Constitutional:  negative for chills, fevers, sweats  Respiratory:  negative for cough, dyspnea on exertion, shortness of breath, wheezing  Cardiovascular:  negative for chest pain, chest pressure/discomfort  Gastrointestinal:  negative for abdominal pain, constipation, diarrhea, nausea, vomiting  Neurological:  negative for dizziness, headache    Medications: Allergies:     Allergies   Allergen Reactions    Aleve [Naproxen Sodium]      Chronic kidney disease stage III, CHF    Pioglitazone Other (See Comments)     Congestive heart failure    Claritin [Loratadine]     Keflex [Cephalexin]     Lisinopril      Needs clarification of contraindication       Current Meds:   Scheduled Meds:    doxycycline hyclate  100 mg Oral 2 times per day    enoxaparin  1 mg/kg Subcutaneous BID    allopurinol  300 mg Oral Daily    atorvastatin  40 mg Oral Daily    insulin lispro 0-12 Units Subcutaneous TID     insulin lispro  0-6 Units Subcutaneous Nightly    clopidogrel  75 mg Oral Daily    cetirizine  10 mg Oral Daily    furosemide  40 mg Oral BID    metoprolol tartrate  50 mg Oral BID    therapeutic multivitamin-minerals  1 tablet Oral Daily    sodium chloride flush  10 mL Intravenous 2 times per day     Continuous Infusions:    amiodarone 0.5 mg/min (07/30/20 0903)    dextrose       PRN Meds: glucose, dextrose, glucagon (rDNA), dextrose, potassium chloride **OR** potassium alternative oral replacement **OR** potassium chloride, albuterol sulfate HFA, sodium chloride flush, magnesium hydroxide, promethazine **OR** ondansetron, acetaminophen, labetalol, hydrALAZINE    Data:     Past Medical History:   has a past medical history of Arthritis, Cellulitis and abscess, Cellulitis and abscess of unspecified site, CHF (congestive heart failure) ([facility] [address] Chronic kidney disease, unspecified, Coronary atherosclerosis of native coronary artery, Essential hypertension, malignant, Hard of hearing, Iron deficiency anemia, unspecified, Myocardial infarction ([facility] [address] Other and unspecified hyperlipidemia, Pure hypercholesterolemia, Type II or unspecified type diabetes mellitus without mention of complication, not stated as uncontrolled, Unspecified asthma(493.90), Unspecified venous (peripheral) insufficiency, and Unspecified vitamin D deficiency. Social History:   reports that she quit smoking about 20 years ago. She has a 2.50 pack-year smoking history. She has never used smokeless tobacco. She reports current alcohol use. She reports that she does not use drugs.      Family History:   Family History   Problem Relation Age of Onset    Diabetes Mother     Heart Disease Mother     Heart Disease Father     Diabetes Sister     High Blood Pressure Sister     Diabetes Maternal Grandmother        Vitals:  BP (!) 130/45   Pulse 64   Temp 97.4 °F (36.3 °C) (Oral)   Resp 16   Ht 4' 11.02\" (1.499 m)   Wt 141 lb 15.6 oz (64.4 kg)   SpO2 100%   BMI 28.66 kg/m²   Temp (24hrs), Av.8 °F (36.6 °C), Min:97.4 °F (36.3 °C), Max:98.3 °F (36.8 °C)    Recent Labs     20  0833 20  1143 20   POCGLU 98 151* 92 141*       I/O (24Hr): Intake/Output Summary (Last 24 hours) at 2020  Last data filed at 2020  Gross per 24 hour   Intake 120 ml   Output 1200 ml   Net -1080 ml       Labs:  Hematology:  Recent Labs     20  0845   WBC 9.7   RBC 4.95   HGB 14.3   HCT 44.6   MCV 90.1   MCH 28.9   MCHC 32.1   RDW 15.3*      MPV 10.8     Chemistry:  Recent Labs     20  0845      K 3.6*      CO2 25   GLUCOSE 114*   BUN 36*   CREATININE 0.83   ANIONGAP 12   LABGLOM >60   GFRAA >60   CALCIUM 9.2     Recent Labs     20  1544 20  2040 20  0833 20  1143 20   POCGLU 196* 175* 98 151* 92 141*     ABG:No results found for: POCPH, PHART, PH, POCPCO2, PCF0DSX, PCO2, POCPO2, PO2ART, PO2, POCHCO3, GJL6KRL, HCO3, NBEA, PBEA, BEART, BE, THGBART, THB, XOY7HDH, ELGD8FNG, N0GXBILE, O2SAT, FIO2  Lab Results   Component Value Date/Time    SPECIAL lac 10ml in both. 2020 12:05 PM     Lab Results   Component Value Date/Time    CULTURE NO GROWTH 6 DAYS 2020 12:05 PM       Radiology:  Ct Head Wo Contrast    Result Date: 2020  No acute intracranial abnormality. Remote appearing lacunar infarction right basal ganglia. Xr Chest Portable    Result Date: 2020  Mild pulmonary vascular congestion. Stable rounded opacity projected in the region the right hilum is of uncertain etiology but could represent right pulmonary artery en face. CT chest could be obtained for confirmation. Ct Chest Pulmonary Embolism W Contrast    Result Date: 2020  1. No evidence of pulmonary embolism or acute pulmonary abnormality. No mass lesion.  2. Dilatation of the main pulmonary artery to 3.6 cm encephalopathy 7/26/2020 Yes    Meningioma, cerebral ([facility] [address] 7/26/2020 Yes    New onset atrial fibrillation ([facility] [address] 7/28/2020 Clinically Undetermined          Plan:        - Vitals, labs, imaging, medications reviewed  - Cardiology consulted for a fib - started on amiodarone - s/p CV, eliquis on dc  - Neurology following - EEG pending  - NS consulted - no intervention for meningioma, follow up MRI in 6 months  - Further stroke workup per neurology  - Further A fib medication adjustments per cardiology   - Continue lovenox  - DC IV meropenem - cultures negative - changed to PO abx for cellulitis  - PT/OT evaluation   - DC planning today      Candelario Crabtree MD  7/31/2020  9:18 AM

## 2020-07-31 NOTE — CARE COORDINATION
Fax to [facility] Walker for [employer], face sheet, order and face to face sent.  Will verify insurance and get back to us

## 2020-07-31 NOTE — PROGRESS NOTES
Vijay Lange was evaluated today and a DME order was entered for a wheeled walker because she requires this to successfully complete daily living tasks of eating, bathing, toileting, personal cares, ambulating, grooming, hygiene, dressing upper body, dressing lower body, meal preparation and taking own medications. A wheeled walker is necessary due to the patient's unsteady gait, upper body weakness, and inability to  an ambulation device; and she can ambulate only by pushing a walker instead of a lesser assistive device such as a cane, crutch, or standard walker. The need for this equipment was discussed with the patient and she understands and is in agreement.     Electronically signed by Charisma Hopkins MD on 7/31/2020 at 9:19 AM

## 2020-07-31 NOTE — PROGRESS NOTES
CLINICAL PHARMACY NOTE: MEDS TO [address] [employer] Patient?: No  Total # of Prescriptions Filled: 1   The following medications were delivered to the patient:  · eliquis 5 mg tab  Total # of Interventions Completed: 1  Time Spent (min): 45    Additional Documentation:Medication delivered to the patient. Used free coupon.

## 2020-07-31 NOTE — PROGRESS NOTES
[facility]  Progress Note                   Date:   7/31/2020  Patient name: Avni Argueta  Date of admission:  7/26/2020 10:23 AM  MRN:   [id number]  YOB: 1950  PCP: Tarun Chen MD    Reason for Admission: Stroke-like episode ([facility] [address] [I63.9]    Subjective:       Clinical Changes /Abnormalities: Seen & examind in room. KARLA/CV yesterday. Remains SR. Review of Systems    Medications:   Scheduled Meds:   doxycycline hyclate  100 mg Oral 2 times per day    enoxaparin  1 mg/kg Subcutaneous BID    allopurinol  300 mg Oral Daily    atorvastatin  40 mg Oral Daily    insulin lispro  0-12 Units Subcutaneous TID WC    insulin lispro  0-6 Units Subcutaneous Nightly    clopidogrel  75 mg Oral Daily    cetirizine  10 mg Oral Daily    furosemide  40 mg Oral BID    metoprolol tartrate  50 mg Oral BID    therapeutic multivitamin-minerals  1 tablet Oral Daily    sodium chloride flush  10 mL Intravenous 2 times per day     Continuous Infusions:   amiodarone 0.5 mg/min (07/30/20 0903)    dextrose       CBC:   Recent Labs     07/30/20  0845   WBC 9.7   HGB 14.3        BMP:    Recent Labs     07/30/20  0845      K 3.6*      CO2 25   BUN 36*   CREATININE 0.83   GLUCOSE 114*     Hepatic:No results for input(s): AST, ALT, ALB, BILITOT, ALKPHOS in the last 72 hours. Troponin: No results for input(s): TROPHS in the last 72 hours. BNP: No results for input(s): BNP in the last 72 hours. Lipids: No results for input(s): CHOL, HDL in the last 72 hours. Invalid input(s): LDLCALCU  INR: No results for input(s): INR in the last 72 hours. Objective:   Vitals: BP (!) 130/45   Pulse 64   Temp 97.4 °F (36.3 °C) (Oral)   Resp 16   Ht 4' 11.02\" (1.499 m)   Wt 141 lb 15.6 oz (64.4 kg)   SpO2 100%   BMI 28.66 kg/m²   General appearance: alert and cooperative with exam  HEENT: Head: Normocephalic, no lesions, without obvious abnormality.   Neck:no JVD, trachea midline, no adenopathy  Lungs: Clear to auscultation  Heart: Regular rate and rhythm, s1/s2 auscultated, no murmurs. SR  Abdomen: soft, non-tender, bowel sounds active  Extremities: no edema  Neurologic: not done    STRESS 6/6/13: No ischemia/infarct. EF 69%.      CATH 7/21/06:   LM: no significant disease. LAD: mid 95% stenosis   LIMA-LAD is patent   LCx: normal  OM1: luminal irregularities without critical obstruction   OM2: proximal 90% stenosis, long lesion   SVG-OM2 is occluded   PLB: no significant disease  RCA: totally occluded   SVG-RCA is patent      Previous office/hospital visit:   Dr. Al Ireland 2/13/13:   1. CABG with LIMA-LAD, SVG-OM and SVG-RCA in 2003. 2. Occluded SVG-OM2 by heart catheterization in 2006. 3. LV dysfunction, mild severity. 4. Hyperlipidemia. 5. Diabetes. 6. Stress test in April 2011 suggested old lateral infarction, small area, with preserved LV  function 64% and no ischemia. KARLA/CV 7/31/20  Operators:  Primary: Ginny Casas MD (Attending)   Assistant: Guillaume Hernandez MD (Fellow) & Marie Gonzalez MD (CV Fellow)     Patient seen and examined. History and Physical reviewed. Labs reviewed.     After informed consent was obtained with explanation of the risks and benefits, the patient was brought to Cath lab. All sedation was administered by the cardiologist. The oropharynx was pre anesthetisized with cetacaine spray.     KARLA:  Structures:  LA: Dilated with spontaneous echo contrast  GURPREET: No thrombus  RA: Normal  RV:  Normal  LV: Normal in size with normal systolic function   Estimated LVEF: 55%     Aorta: Moderate atheromatous disease arch  Percardium: No pericardial effusion  Septum: No intracardiac shunt via color Doppler. No intracardiac shunt via injection of agitated saline.     Valves:  Mitral Valve: Structurally normal. Mild regurgitation is identified. Aortic Valve: The aortic valve is trileaflet and opens adequately. Moderate regurgitiation is identified.   Tricuspid valve: Structurally normal. No regurgitation is identified. Pulmonary valve: Normal. No significant regurgitation     No valvular vegetations or thrombus identified.     Summary:   1. A KARLA was performed without complications. 2. Normal LV size with normal LVEF. 3. No thrombus or valvular vegetation identified  4. Moderate atheromatous disease noted in aortic arch     CARDIOVERSION:  After an adequate level of sedation was achieved, 200J in biphasic synchronized delivery was administered. conversion to normal sinus rhythm. The patient awoke without complications. Assessment / Acute Cardiac Problems:   1. New onset Afib RVR s/p KARLA/CV  2. Extensive CAD with prior CABG 2003  3. HLP  4. DM2      Patient Active Problem List:     Vitamin D deficiency     Venous (peripheral) insufficiency     Type 2 diabetes mellitus with stage 3 chronic kidney disease, without long-term current use of insulin (HCC)     Coronary artery disease involving native coronary artery of native heart without angina pectoris     Iron deficiency anemia     Stage 3 chronic kidney disease ([address]     Colonoscopy refused     Pneumococcal vaccine refused     Bilateral hearing loss     Chronic diastolic CHF (congestive heart failure) ([address]     Chronic obstructive pulmonary disease (HCC)     HTN. Benign hypertension with CKD (chronic kidney disease) stage III (HCC)     Gout with tophi     Hyperlipidemia with target LDL less than 70     Dry skin dermatitis HANDS     Acute CVA (cerebrovascular accident) ([address]     Essential hypertension     Stroke-like episode ([address]     Cellulitis of left lower extremity     Brain mass     Metabolic encephalopathy     Meningioma, cerebral ([address]     New onset atrial fibrillation ([address]      Plan of Treatment:   1. Post KARLA/CV. Remains SR. Continue PO BB. Will start Eliquis 5mg BID  2. OK for discharge from CV standpoint. F/U in clinic in 2 weeks.      Electronically signed by MAL Hammer CNP on 7/31/2020 at 12:19 [address] [facility].  [phone]

## 2020-07-31 NOTE — CONSULTS
[facility] Neurology   IN-PATIENT SERVICE      NEUROLOGY CONSULT  PROGRESS  NOTE            Date:   7/31/2020  Patient name:  Man Mann  Date of admission:  7/26/2020  YOB: 1950      Interval History:   Man Mann is a  78 y.o. female admitted on 7/26/2020 with Stroke-like episode ([facility] [address] [I63.9]. This is a follow-up neurology progress note. The patient was seen and examined and the chart was reviewed. 7/31/2020  Examined patient at bedside, patient having breakfast,  Patient is alert oriented to time place person. Patient denies any symptoms. Patient had cardioversion yesterday on telemetry patient in is in sinus rhythm. 7/30/2020:   Examined patient at the bedside, patient sitting in the chair having stable work-up. Patient's daughter present in the room. Patient currently on amiodarone drip, going for cardiac cardioversion this afternoon around 3 PM.  Patient denies any headache, dizziness, confusion, tingling, numbness or any new weakness. Interval history: 7/28/2020  The patient at the bedside, daughter present in the room. As per the daughter patient has returned to the baseline, patient looking physical therapy with elbow will work-up. Cardiology following the patient for A. fib currently on amiodarone. Patient denies any headache, dizziness, confusion, tingling, numbness or any other weakness. EEG: Mild diffuse slowing. EEG concordant with diffuse encephalopathy      Interval history  7/27/2020:    Examined the patient at the bedside, daughter present in the room. Patient found to be in A. fib, cardiology consulted, amiodarone started. Next    Patient on aspirin 81, Lipitor 40, Plavix 75. Insulin sliding scale  Meropenem as per primary  PT/OT  EEG: Awaiting    i PT/OT  EEG esvin    History of Present Illness:       The patient is a 78 y.o. female with significant past medical history of HTN, CHF, decreased hearing, DM2 who presents with AMS, weakness and to to have Fever, UTI, Cellulitis, right middle cranial fossa meningioma                 As per daughter patient presented with 2 days of altered mental status, saying things incorrectly  And being confused. Patient also has bilateral knee problems and having some pain and weakness bilateral lower extremity. Patient was taken to [facility] ED where they found that the patient was having temperature of 39.7, positive for UTI and cellulitis. On CT head without contrast they noticed some old right basal ganglia infarct. CT head and neck showed 60% stenosis of the origin of the right internal carotid artery, 30% focal stenosis at the origin of the left internal carotid artery, 50% stenosis of the distal P2 segment of the left PCA. He also noticed some 2 cm extra-axial enhancing mass on the posterior aspect of the right middle cranial fossa that could be a meningioma. Brain MRI with and without revealed the right middle cranial fossa meningioma.   Decision was to transfer patient to [facility] to be evaluated by neurosurgery due to concern of meningioma versus abscess and neurology for altered mental status        Past Medical History:     Past Medical History:   Diagnosis Date    Arthritis     Cellulitis and abscess     Cellulitis and abscess of unspecified site     CHF (congestive heart failure) (HCC)     Chronic kidney disease, unspecified     Coronary atherosclerosis of native coronary artery     Essential hypertension, malignant     Hard of hearing     Iron deficiency anemia, unspecified     Myocardial infarction ([facility] [address]     Other and unspecified hyperlipidemia     Pure hypercholesterolemia     Type II or unspecified type diabetes mellitus without mention of complication, not stated as uncontrolled     Unspecified asthma(493.90)     Unspecified venous (peripheral) insufficiency     Unspecified vitamin D deficiency         Past Surgical History:     Past Surgical History: Procedure Laterality Date    CORONARY ARTERY BYPASS GRAFT      x 3, Dr. Konrad Connors          Medications during admission:      doxycycline hyclate  100 mg Oral 2 times per day    enoxaparin  1 mg/kg Subcutaneous BID    allopurinol  300 mg Oral Daily    atorvastatin  40 mg Oral Daily    insulin lispro  0-12 Units Subcutaneous TID WC    insulin lispro  0-6 Units Subcutaneous Nightly    clopidogrel  75 mg Oral Daily    cetirizine  10 mg Oral Daily    furosemide  40 mg Oral BID    metoprolol tartrate  50 mg Oral BID    therapeutic multivitamin-minerals  1 tablet Oral Daily    sodium chloride flush  10 mL Intravenous 2 times per day         Physical Exam:   BP (!) 130/45   Pulse 64   Temp 97.4 °F (36.3 °C) (Oral)   Resp 16   Ht 4' 11.02\" (1.499 m)   Wt 141 lb 15.6 oz (64.4 kg)   SpO2 100%   BMI 28.66 kg/m²   Temp (24hrs), Av.8 °F (36.6 °C), Min:97.4 °F (36.3 °C), Max:98.3 °F (36.8 °C)        General examination:      General Appearance:  alert, well appearing, and in no acute distress  HEENT: Normocephalic, atraumatic, moist mucus membranes  Neck: supple, no carotid bruits, (-) nuchal rigidity  Lungs:  Respirations unlabored, chest wall no deformity, BS normal  Cardiovascular: normal rate, regular rhythm  Abdomen: Soft, nontender, nondistended, normal bowel sounds  Skin: No gross lesions, rashes, bruising or bleeding on exposed skin area  Extremities:  peripheral pulses palpable, clubbing or edema  Psych: normal affect      Neurological examination:      Mental status   Alert and oriented x 3; following all commands;   speech is fluent, no dysarthria, aphasia.       Cranial nerves   II - visual fields intact to confrontation; pupils reactive  III, IV, VI - extraocular muscles intact; no SIERRA; no nystagmus; no ptosis   V - normal facial sensation                                                               VII - normal facial symmetry VIII - intact hearing                                                                             IX, X - symmetrical palate elevation                                               XI - symmetrical shoulder shrug                                                       XII - midline tongue without atrophy or fasciculation     Motor function  Strength:   5/5 RUE, 5/5 RLE  5/5 LUE, 5/5  LLE  Normal bulk and tone. Sensory function Intact to touch, pin, vibration, proprioception throughout     Cerebellar Intact finger-nose-finger testing. Intact heel-shin testing. No dysdiadochokinesia present. No tremors                        Reflex function 2/4 symmetric throughout . Downgoing plantar response bilaterally. (-)Craig's sign bilaterally      Gait                  Normal station and gait. Normal Tandem, tip toes and heel walking             Diagnostics:      Laboratory Testing:  CBC:   Recent Labs     07/30/20  0845   WBC 9.7   HGB 14.3          BMP:    Recent Labs     07/30/20  0845      K 3.6*      CO2 25   BUN 36*   CREATININE 0.83   GLUCOSE 114*         Lab Results   Component Value Date    CHOL 117 07/27/2020    LDLCHOLESTEROL 47 07/27/2020    HDL 48 07/27/2020    TRIG 112 07/27/2020    ALT 16 07/27/2020    AST 24 07/27/2020    TSH 1.39 07/27/2020    INR 1.0 07/26/2020    LABA1C 8.3 (H) 07/27/2020    LABMICR 155 (H) 02/27/2019    [id number] 1205 03/11/2020         No results found for: PHENYTOIN, PHENYTOIN, VALPROATE, CBMZ        Imaging/Diagnostics:       1. CT head without contrast  Impression    No acute intracranial abnormality. Remote appearing lacunar infarction right basal ganglia. 2. CTA head and neck  Impression    60% stenosis at the origin of the right internal carotid artery         30% focal stenosis at the origin of the left internal carotid artery. 50% focal stenosis in the distal P2 segment of the left PCA.          2 cm extra-axial enhancing mass along the anterior aspect of the right middle    cranial fossa, likely related to a meningioma. Further evaluation with MRI    brain with and without contrast is recommended. Old infarction in the right head of caudate nucleus. 3. Brain MRI with and without contrast  Impression    Probable meningioma right middle cranial fossa. Otherwise no acute disease.                          Impression:        Altered mental status  Right-sided meningioma  UTI  Cellulitis        Plan:     67-year-old female admitted to [facility] for the management of right-sided meningioma, UTI, cellulitis, altered mental status. EEG: EEG showed presence of mild diffuse slowing, consistent with diffuse encephalopathy. No clear lateralized or epileptiform disturbance  PT/OT  Aspirin 81  Plavix 75  Lipitor 40  Fall precaution  Neurosurgery:  Electrolyte replacement as per primary, potassium 3.3  Rest of the treatment as per primary  Cardiology consulted for A fib    - Started on Amiodarone    -Cardioversion   Disposition as per primary team    Patient was staffed with the attending  Moises Kenney    Electronically signed by Alexander Centeno MD on 7/31/2020 at 11:06 AM      Devyn Branham MD  Neurology PGY-2 Resident.    Neurology [facility]

## 2020-08-01 NOTE — DISCHARGE SUMMARY
labs, imaging, medications reviewed  - Cardiology consulted for a fib - started on amiodarone - s/p CV, eliquis on dc  - Neurology following - EEG negative   - NS consulted - no intervention for meningioma, follow up MRI in 6 months  - Further stroke workup per neurology  - Further A fib medication adjustments per cardiology   - Continue lovenox  - DC IV meropenem - cultures negative - changed to PO abx for cellulitis  - PT/OT evaluation   - DC planning today    Significant Diagnostic Studies:   Labs / Micro:  CBC:   Lab Results   Component Value Date    WBC 9.7 07/30/2020    RBC 4.95 07/30/2020    HGB 14.3 07/30/2020    HCT 44.6 07/30/2020    MCV 90.1 07/30/2020    MCH 28.9 07/30/2020    MCHC 32.1 07/30/2020    RDW 15.3 07/30/2020     07/30/2020     BMP:    Lab Results   Component Value Date    GLUCOSE 114 07/30/2020     07/30/2020    K 3.6 07/30/2020     07/30/2020    CO2 25 07/30/2020    ANIONGAP 12 07/30/2020    BUN 36 07/30/2020    CREATININE 0.83 07/30/2020    BUNCRER NOT REPORTED 07/30/2020    CALCIUM 9.2 07/30/2020    LABGLOM >60 07/30/2020    GFRAA >60 07/30/2020    GFR      07/30/2020    GFR NOT REPORTED 07/30/2020     HFP:    Lab Results   Component Value Date    PROT 5.4 07/27/2020     CMP:    Lab Results   Component Value Date    GLUCOSE 114 07/30/2020     07/30/2020    K 3.6 07/30/2020     07/30/2020    CO2 25 07/30/2020    BUN 36 07/30/2020    CREATININE 0.83 07/30/2020    ANIONGAP 12 07/30/2020    ALKPHOS 174 07/27/2020    ALT 16 07/27/2020    AST 24 07/27/2020    BILITOT 0.54 07/27/2020    LABALBU 2.2 07/27/2020    ALBUMIN 0.7 07/27/2020    LABGLOM >60 07/30/2020    GFRAA >60 07/30/2020    GFR      07/30/2020    GFR NOT REPORTED 07/30/2020    PROT 5.4 07/27/2020    CALCIUM 9.2 07/30/2020     PT/INR:    Lab Results   Component Value Date    PROTIME 12.9 07/26/2020    INR 1.0 07/26/2020     PTT:   Lab Results   Component Value Date    APTT 27.8 07/25/2020     FLP:    Lab Results   Component Value Date    CHOL 117 07/27/2020    TRIG 112 07/27/2020    HDL 48 07/27/2020     U/A:    Lab Results   Component Value Date    COLORU YELLOW 07/25/2020    TURBIDITY CLEAR 07/25/2020    SPECGRAV 1.018 07/25/2020    HGBUR MOD 07/25/2020    PHUR 6.0 07/25/2020    PROTEINU 4+ 07/25/2020    GLUCOSEU TRACE 07/25/2020    KETUA NEGATIVE 07/25/2020    BILIRUBINUR NEGATIVE 07/25/2020    UROBILINOGEN Normal 07/25/2020    NITRU NEGATIVE 07/25/2020    LEUKOCYTESUR NEGATIVE 07/25/2020     TSH:    Lab Results   Component Value Date    TSH 1.39 07/27/2020        Radiology:  Ct Head Wo Contrast    Result Date: 7/25/2020  No acute intracranial abnormality. Remote appearing lacunar infarction right basal ganglia. Xr Chest Portable    Result Date: 7/25/2020  Mild pulmonary vascular congestion. Stable rounded opacity projected in the region the right hilum is of uncertain etiology but could represent right pulmonary artery en face. CT chest could be obtained for confirmation. Ct Chest Pulmonary Embolism W Contrast    Result Date: 7/25/2020  1. No evidence of pulmonary embolism or acute pulmonary abnormality. No mass lesion. 2. Dilatation of the main pulmonary artery to 3.6 cm which can be seen in pulmonary arterial hypertension. 3. Partially imaged splenomegaly of uncertain etiology. Cta Head Neck W Contrast    Result Date: 7/25/2020  60% stenosis at the origin of the right internal carotid artery 30% focal stenosis at the origin of the left internal carotid artery. 50% focal stenosis in the distal P2 segment of the left PCA. 2 cm extra-axial enhancing mass along the anterior aspect of the right middle cranial fossa, likely related to a meningioma. Further evaluation with MRI brain with and without contrast is recommended. Old infarction in the right head of caudate nucleus. Mri Brain W Wo Contrast    Result Date: 7/25/2020  Probable meningioma right middle cranial fossa.   Otherwise no acute disease. Consultations:    Consults:     Final Specialist Recommendations/Findings:   IP CONSULT TO NEUROSURGERY  IP CONSULT TO NEUROLOGY  IP CONSULT TO CARDIOLOGY      The patient was seen and examined on day of discharge and this discharge summary is in conjunction with any daily progress note from day of discharge. Discharge plan:     Disposition: Home    Physician Follow Up:     Jasiel Samuel MD  [address] [place] [address] Thomas Doy Apgar, [place] [address] [facility]  [phone]    Schedule an appointment as soon as possible for a visit in 6 months  Please follow up with neurosurgery in 6 months with repeat MRI brain with and without contrast for right frontal mass.      Prem Mason, APRN - CNP  [address] [facility] [address] [facility]  [phone]      Cardiology -- Appt is with Kailey Butler NP on  at 9:30 at [facility] office        Requiring Further Evaluation/Follow Up POST HOSPITALIZATION/Incidental Findings:     Diet: regular diet    Activity: As tolerated    Instructions to Patient: follow up with cardiology, NS    Discharge Medications:      Medication List      START taking these medications    apixaban 5 MG Tabs tablet  Commonly known as:  ELIQUIS  Take 1 tablet by mouth 2 times daily     doxycycline hyclate 100 MG tablet  Commonly known as:  VIBRA-TABS  Take 1 tablet by mouth every 12 hours for 5 days        CONTINUE taking these medications    albuterol sulfate  (90 Base) MCG/ACT inhaler  Commonly known as:  Proventil HFA  Inhale 1-2 puffs into the lungs every 4 hours as needed for Wheezing     allopurinol 300 MG tablet  Commonly known as:  Zyloprim  Take 1 tablet by mouth daily Per rheumatologist     ammonium lactate 12 % cream  Commonly known as:  AMLACTIN     atorvastatin 40 MG tablet  Commonly known as:  LIPITOR  Take 1 tablet by mouth daily ** stop simvastatin& fenofibrate **     blood glucose monitor kit and supplies  1 kit by Other route three times daily Dispense Butterfly Elite CBG Device     blood glucose test strips strip  Commonly known as:  FREESTYLE LITE  1 each by In Vitro route daily As needed. * Blood Pressure Kit  1 each by Does not apply route daily     * Blood Pressure Kit  1 kit by Does not apply route three times daily     clobetasol 0.05 % ointment  Commonly known as:  Temovate  Apply topically 2 times daily. For 7days     clopidogrel 75 MG tablet  Commonly known as:  PLAVIX  Take 1 tablet by mouth daily     desloratadine 5 MG tablet  Commonly known as:  Clarinex  Take 1 tablet by mouth daily Patient needs to contact office before any further refills will be approved     diclofenac sodium 1 % Gel  Commonly known as:  VOLTAREN     FreeStyle Lancets Misc  1 each by Does not apply route daily Patient needs to contact office before any further refills will be approved     furosemide 40 MG tablet  Commonly known as:  LASIX  TAKE ONE TABLET BY MOUTH TWICE A DAY     metFORMIN 500 MG tablet  Commonly known as:  GLUCOPHAGE  Take 1 tablet by mouth 2 times daily (with meals)     metoprolol tartrate 50 MG tablet  Commonly known as:  LOPRESSOR  Take 1 tablet by mouth 2 times daily     mineral oil-hydrophilic petrolatum ointment  Apply topically twice a day as needed for dry skin on the hands and forearms     therapeutic multivitamin-minerals tablet         * This list has 2 medication(s) that are the same as other medications prescribed for you. Read the directions carefully, and ask your doctor or other care provider to review them with you.             STOP taking these medications    aspirin EC 81 MG EC tablet           Where to Get Your Medications      These medications were sent to [facility] [address] [facility], [address] [facility]  [address], [place] [address]    Phone:  [phone]   · apixaban 5 MG Tabs tablet  · doxycycline hyclate 100 MG tablet No discharge procedures on file. Time Spent on discharge is  35 mins in patient examination, evaluation, counseling as well as medication reconciliation, prescriptions for required medications, discharge plan and follow up. Electronically signed by   Glynis Cowden, MD  8/1/2020  8:29 AM      Thank you Dr. Tarun Chen MD for the opportunity to be involved in this patient's care.

## 2020-08-03 ENCOUNTER — TELEPHONE (OUTPATIENT)
Dept: FAMILY MEDICINE CLINIC | Age: 70
End: 2020-08-03

## 2020-08-03 NOTE — TELEPHONE ENCOUNTER
Lauren 45 Transitions Initial Follow Up Call    Outreach made within 2 business days of discharge: Yes    Patient: Kristen Vicente Patient : 1950   MRN: [id number]  Reason for Admission: There are no discharge diagnoses documented for the most recent discharge. Discharge Date: 20       Spoke with: Billie Landin    Discharge department/facility: [place] [address] Interactive Patient Contact:  Was patient able to fill all prescriptions: yes  Was patient instructed to bring all medications to the follow-up visit: yes  Is patient taking all medications as directed in the discharge summary?  yes  Does patient understand their discharge instructions: yes  Does patient have questions or concerns that need addressed prior to 7-14 day follow up office visit: no    Scheduled appointment with PCP within 7-14 days    Follow Up  Future Appointments   Date Time Provider Angela Reyez   2020  3:00 PM Ange Salazar, [address], [address] [place] [address] [facility]   2020  3:30  [facility] DIGITAL RM STCZ MAMMO 145 [facility] Radiolog       [place], [place]

## 2020-08-20 ENCOUNTER — HOSPITAL ENCOUNTER (OUTPATIENT)
Dept: DIABETES SERVICES | Age: 70
Setting detail: THERAPIES SERIES
Discharge: HOME | End: 2020-08-20
Payer: MEDICARE

## 2020-08-20 PROCEDURE — G0108 DIAB MANAGE TRN  PER INDIV: HCPCS

## 2020-08-21 NOTE — PROGRESS NOTES
Diabetes Self- Management Education Program Assessment -   Also see Diabetic Screening  Patient, Jackie Santillan,  here for diabetes self-management education  visit/ assessment. Today's visit was in an individual setting. Diet History :  Diet Questionnaire and typical meal /portion sheet completed  [x]Yes  [] NO    Goals setting:  Goal work sheet completed  []Yes  [x] NO  (SEE  EDUCATION AND GOALS FLOWSHEET)    MEDICAL HISTORY:  Past Medical History:   Diagnosis Date    Arthritis     Cellulitis and abscess     Cellulitis and abscess of unspecified site     CHF (congestive heart failure) (HCC)     Chronic kidney disease, unspecified     Coronary atherosclerosis of native coronary artery     Essential hypertension, malignant     Hard of hearing     Iron deficiency anemia, unspecified     Myocardial infarction ([facility] [address]     Other and unspecified hyperlipidemia     Pure hypercholesterolemia     Type II or unspecified type diabetes mellitus without mention of complication, not stated as uncontrolled     Unspecified asthma(493.90)     Unspecified venous (peripheral) insufficiency     Unspecified vitamin D deficiency      Family History   Problem Relation Age of Onset    Diabetes Mother     Heart Disease Mother     Heart Disease Father     Diabetes Sister     High Blood Pressure Sister     Diabetes Maternal Grandmother      Aleve [naproxen sodium]; Pioglitazone; Claritin [loratadine]; Keflex [cephalexin]; and Lisinopril     There is no immunization history on file for this patient.   Current Medications  Current Outpatient Medications   Medication Sig Dispense Refill    apixaban (ELIQUIS) 5 MG TABS tablet Take 1 tablet by mouth 2 times daily 60 tablet 11    desloratadine (CLARINEX) 5 MG tablet Take 1 tablet by mouth daily Patient needs to contact office before any further refills will be approved 90 tablet 3    furosemide (LASIX) 40 MG tablet TAKE ONE TABLET BY MOUTH TWICE A  tablet 0    metFORMIN (GLUCOPHAGE) 500 MG tablet Take 1 tablet by mouth 2 times daily (with meals) 180 tablet 3    metoprolol tartrate (LOPRESSOR) 50 MG tablet Take 1 tablet by mouth 2 times daily 180 tablet 3    Multiple Vitamins-Minerals (THERAPEUTIC MULTIVITAMIN-MINERALS) tablet Take 1 tablet by mouth daily      atorvastatin (LIPITOR) 40 MG tablet Take 1 tablet by mouth daily ** stop simvastatin& fenofibrate ** 90 tablet 3    mineral oil-hydrophilic petrolatum (HYDROPHOR) ointment Apply topically twice a day as needed for dry skin on the hands and forearms 454 g 3    allopurinol (ZYLOPRIM) 300 MG tablet Take 1 tablet by mouth daily Per rheumatologist 90 tablet 0    diclofenac sodium 1 % GEL APPLY 4 GRAMS TOPICALLY THREE TIMES DAILY FOR 30 DAYS      albuterol sulfate HFA (PROVENTIL HFA) 108 (90 Base) MCG/ACT inhaler Inhale 1-2 puffs into the lungs every 4 hours as needed for Wheezing 1 Inhaler 0    Blood Pressure KIT 1 kit by Does not apply route three times daily 1 kit 0    FREESTYLE LANCETS MISC 1 each by Does not apply route daily Patient needs to contact office before any further refills will be approved 90 each 3    blood glucose test strips (FREESTYLE LITE) strip 1 each by In Vitro route daily As needed. 100 each 3    Blood Pressure KIT 1 each by Does not apply route daily 1 kit 0    clobetasol (TEMOVATE) 0.05 % ointment Apply topically 2 times daily. For 7days 45 g 0    blood glucose monitor kit and supplies 1 kit by Other route three times daily Dispense Butterfly Elite CBG Device 1 kit 0    clopidogrel (PLAVIX) 75 MG tablet Take 1 tablet by mouth daily 90 tablet 3    ammonium lactate (AMLACTIN) 12 % cream Apply 2 g topically as needed for Dry Skin. Apply topically as needed. No current facility-administered medications for this encounter.    :     Comments:  Allergies:     Allergies   Allergen Reactions    Aleve [Naproxen Sodium]      Chronic kidney disease stage III, CHF    Pioglitazone Other purine and carb control but that since bs are good focus on low purine. Gave sample menu and verbal instructions b/c of poor eyesight. 8/20/20 JW cont w simple instructions, session difficult b/c of masks, pt hard of hearing and poor eyesight. Tried to verbalize as much as possible and discuss foods in terms of Red, Yellow, Green. Difficult as pt's tastes have changed from stroke and is picky eater- discussed moderation approach along w above vs yes/no to give pt some variety! What to eat - Food groups, When to eat - timing of meals and snacks, and How much to eat - portions control. Used dinner plate method for demonstrating healthy eating. About 1500 calories/ day   CHO choices/ meal  3,1,3,1,3,1   CHO choices/  day   grams of protein /day   gram of fat /day     Correctly read food labels & demonstrate CHO counting & portion control with personalized meal plan. Identify dining out strategies, & dietary sick day guidelines. 2 3/17/20 JW 8/20/20 JW     Incorporating physical activity into lifestyle:   Verbalize effect of exercise on blood glucose levels; benefits of regular exercise; safety considerations; contraindications; maintenance of activity. 2 8/20/20 JW   Doesn't get around well physically. Using medications safely:  Identify effects of diabetes medicines on blood glucose levels; List diabetes medication taken, action & side effects;    1 8/20/20 JW   Was on glipizide but d/c/ed. Now just metformin. Insulin / Injectable - Appropriate injection sites; proper storage; supplies needed; proper technique; safe needle disposal guidelines. na       Monitoring blood glucose, interpreting and using results:  Identify recommended & personal blood glucose targets; importance of testing; testing supplies; HgbA1C target levels; Factors affecting blood glucose;  Importance of logging blood glucose levels for pattern recognition; ketone testing; safe lancet disposal.   3 8/20/20 JW   Checks bs 1-2 x/day fbs and 2 hour pp supper. HgbA1c 5.8%  8/20/20 JW HgbA1c up to 8.3%   Prevention, detection & treatment of acute complications:  Identify symptoms of hyper & hypoglycemia, and prevention & treatment strategies. 2    Reports not having extremes with bs lows or highs. Describe sick day guidelines & indications for  physician notification. Identify short term consequences of poor control. 1       Prevention, detection & treatment of chronic complications:  Define the natural course of diabetes & describe the relationship of blood glucose levels to long term complications of diabetes. Identify preventative measures & standards of care. 1       Developing strategies to address psychosocial issues:  Describe feelings about living with diabetes; Describe how stress, depression & anxiety affect blood glucose; Identify coping strategies; Identify support needed & support network available. 2       Developing strategies to promote health/change behavior: Identify 7 self-care behaviors; Personal health risk factors; Benefits, challenges & strategies for behavioral change;    2         Individualized goal selection. My goal , to help me improve my health, I will:   1. Continue to try to eat within 1 hour of waking and avoid going longer than 5 hours without. 2.       Plan  Follow-up Appointments planned in individual setting (poor eyesight, hearing and physically)    Next Appointment (5/20/20)    Instruction Method: [x]Lecture/Discussion  []Power Point Presentation  [x]Handouts  []Return Demonstration      Education Materials/Equipment Provided:    [x]Self-Management - Initial assessment - Enrolment in to ADA  Where do I Begin, Living with Type 2 diabetes ADA home support program and handout for no concentrated sweets and diet meal planning basics, handout on diabetes education classes.  3/17/20 JW (and MNT info given)    []Self-Management  Class 1 - \"Diabetes - your take control guide\" - ADA booklet and Healthy i On the Road to better managing your diabetes map handouts    [x] Self-Management  Class 2 - Meal Plan and handout for serving sizes, smarter snacking, Ready Set Carb Counting / Plate Method, Nutrient Conversion and [facility] Booklet Healthy Eating for People with Diabetes and Nutrition in the [employer] - fast facts about fast food8/20/20 JW    [] Self-Management  Class 3 -  Diabetes ID card,  foot care tips sheet, Healthy I  Continuing Your Journey with Diabetes map handout, Individualized Diabetes report card    [] Self-Management Class 4 - BD Booklet  Sick Day Rules and  Dinning Out Guide , recipe hand outs and tips, diabetes Cookbooks  ( when available)     []Self-Management - 3 month follow - up  AADE booklet Side by Side a partnership approach to diabetes self- care, [place] Tobacco Quit line, [facility] Diabetes support program information sheet, [address] [facility] information sheet       []Self-Management  Gestational - RN class -Gestational Diabetes Mellitus ( GDM) toolkit form [place] gestational diabetes postpartum care learning collaborative 2018. Gestational diabetes handout from [facility] jan 2016  \"Simple Guidelines for meal planning with gestational diabetes\" handout 3 meals and 3 snacks  SMBG sheets to fax back to [facility] weekly  BD  healthy injection site selection and rotation with 6 mm insulin syringe and 4 mm pen needle  Did you have gestational diabetes when you were pregnant?  Handout from [facility]  April 2014    []Self-Management Gestational - RD class - My Food Plan for Gestational diabetes    []Glucose Meter     []Insulin Kit     []Other      Encounter Type Date Start Time End Time Comments No Show Dates   Assessment 3/17/20 JW   11:30 12:45   [x]In Person with daughter, Jyoti Meade who has Type 2 DM and very supportive  []Telephone    Class 1 - Understanding diabetes         Class 2- Nutrition and diabetes   8/20/20 JW 3:00 4:15 X w Jyoti Meade, communication difficult as pt very [employer] and no hearing aides. Pt recently in hospital d/t stroke and found mass on brain but MD doesn't think cancerous- will watch/monitor growth. Class 3 - Preventing Complications         Class 4 -  In depth Nutrition and sick day care        Class 5 - 3 month follow up / goal reassessment        Gestational - RN         Gestational - RD        Individual MNT         Shared Med Appt         Yearly Follow-up        Meter Instrx        Insulin Instrx      []Pen  []Vial & Syringe      DSMS Support :   [] MNT      [] Annual update     [] Starting Fresh  adults living with diabetes or pre diabetes. [address] [place], [place]    [id number]-4:30pm- on 6 week rotation  Free -  REGISTRATION IS REQUIRED - XHIC [phone] call for dates    []  Diabetes Group at  [address] - Free 6 week diabetes education support   classes - contact : Argentina Pickard [phone]  for dates and locations      []ADA  Where do I Begin, Living with Type 2 diabetes ADA home support program  Web site: diabetes. org/living    Call: 1800 DIABETES  e-mail: Tracy@BrightBytes. org     []  Internet web sites - ADAWeb site: diabetes. org/living   D- life   [] [facility] opens at 8 30 am daily for walkers, shops open at 8 am   [address] [place] [phone] Daily - 8:30am - 10am    3rd Tuesday of every month [id number] [facility] expert speakers visit from 9 - 10am        [] [address] [place] [address] (site rotate)  Call [phone] or visit   website: [email or web address] for more details   Check web site for updated times/ dates       S[] avvy Senior Exercise Classes  Mohan Chi  Free class   [facility]  [address] [facility], [address] [address] [place] [address] [place] Tuesday and Thursday -   9 :30 am- 10:30am - ongoing   [] [address] classes   [facility]  [address] [facility] [place]  [place], [address] [address] Thursday 11:00am - 11:45am     [] Third Wednesday Cooking  Class  Free  Registration is required     [address] [facility] [address] [address] [facility]. [address] [facility], [address] [facility] [phone] or   Email [email or web address]   Wednesdays-5:00- 6:00 pm       [] Eat Smart  Be Active & Learn How - Free   Families & grandparents with children in home 0- 25 & pregnant moms          Various sites in community - call to find next session near you. [facility] extension office for future sessions - Saturnino Mustafa  Email : [email or web address]. [email or web address]  Phone: [phone]     [] (SNAP-Ed)   - free nutrition education   - adults and youth, who are eligible for the Supplemental Nutrition Assistance Program    Various sites in community - call to find next session near you. [facility] PASSJONNYNT-CRAN[facility]ERR[facility]-ER SNAP-Ed  contact Robyn Olea, Email:[email or web address]           Post Education Referrals:      [] [place] Tobacco Quit information sheet and [address] [facility] , [facility]       [] Dental care - [facility]     [] [facility] link  phone number - for information and referral to [address], WOUND, WEIGHT MANAGEMENT        []Other  Carli Em, RD, LD

## 2020-08-27 RX ORDER — CLOPIDOGREL BISULFATE 75 MG/1
75 TABLET ORAL DAILY
Qty: 30 TABLET | Refills: 0 | Status: SHIPPED | OUTPATIENT
Start: 2020-08-27 | End: 2020-09-09 | Stop reason: ALTCHOICE

## 2020-08-27 RX ORDER — CLOPIDOGREL BISULFATE 75 MG/1
75 TABLET ORAL DAILY
Qty: 90 TABLET | Refills: 3 | Status: SHIPPED | OUTPATIENT
Start: 2020-08-27 | End: 2020-10-06 | Stop reason: SDUPTHER

## 2020-08-27 NOTE — TELEPHONE ENCOUNTER
Please Approve or Refuse.   Send to Pharmacy per Pt's Request: patient also needs 30 days supply sent to her  local pharmacy [facility]      Next Visit Date:  9/9/2020   Last Visit Date: 2/20/2020    Hemoglobin A1C (%)   Date Value   07/27/2020 8.3 (H)   12/18/2019 5.8   02/27/2019 5.8             ( goal A1C is < 7)   BP Readings from Last 3 Encounters:   07/31/20 (!) 130/45   07/26/20 (!) 155/81   02/20/20 120/60          (goal 120/80)  BUN   Date Value Ref Range Status   07/30/2020 36 (H) 8 - 23 mg/dL Final     CREATININE   Date Value Ref Range Status   07/30/2020 0.83 0.50 - 0.90 mg/dL Final     Potassium   Date Value Ref Range Status   07/30/2020 3.6 (L) 3.7 - 5.3 mmol/L Final

## 2020-08-28 ENCOUNTER — HOSPITAL ENCOUNTER (OUTPATIENT)
Dept: WOMENS IMAGING | Age: 70
Discharge: HOME | End: 2020-08-30
Payer: MEDICARE

## 2020-08-28 PROCEDURE — 77063 BREAST TOMOSYNTHESIS BI: CPT

## 2020-09-08 ENCOUNTER — TELEPHONE (OUTPATIENT)
Dept: FAMILY MEDICINE CLINIC | Age: 70
End: 2020-09-08

## 2020-09-08 NOTE — TELEPHONE ENCOUNTER
If she is not having any symptoms she can just schedule a virtual visit with anyone in the office.  There are no reason to ho to the ER

## 2020-09-08 NOTE — TELEPHONE ENCOUNTER
Is she having any symptoms? Like headache, dizziness, neck pain, weakness, etc. Since we can not visualize/assess the scalp/head area, it will be hard to determine the need to be concerned.

## 2020-09-09 ENCOUNTER — OFFICE VISIT (OUTPATIENT)
Dept: FAMILY MEDICINE CLINIC | Age: 70
End: 2020-09-09
Payer: MEDICARE

## 2020-09-09 VITALS
WEIGHT: 138.6 LBS | HEART RATE: 74 BPM | BODY MASS INDEX: 27.94 KG/M2 | SYSTOLIC BLOOD PRESSURE: 110 MMHG | HEIGHT: 59 IN | DIASTOLIC BLOOD PRESSURE: 76 MMHG | OXYGEN SATURATION: 97 %

## 2020-09-09 PROBLEM — S00.93XA HEAD CONTUSION: Status: ACTIVE | Noted: 2020-09-09

## 2020-09-09 PROBLEM — E87.6 HYPOKALEMIA: Status: ACTIVE | Noted: 2020-09-09

## 2020-09-09 PROBLEM — H91.90 IMPAIRED HEARING: Status: ACTIVE | Noted: 2020-02-20

## 2020-09-09 PROCEDURE — 1036F TOBACCO NON-USER: CPT | Performed by: FAMILY MEDICINE

## 2020-09-09 PROCEDURE — G8417 CALC BMI ABV UP PARAM F/U: HCPCS | Performed by: FAMILY MEDICINE

## 2020-09-09 PROCEDURE — 1123F ACP DISCUSS/DSCN MKR DOCD: CPT | Performed by: FAMILY MEDICINE

## 2020-09-09 PROCEDURE — G8427 DOCREV CUR MEDS BY ELIG CLIN: HCPCS | Performed by: FAMILY MEDICINE

## 2020-09-09 PROCEDURE — 4040F PNEUMOC VAC/ADMIN/RCVD: CPT | Performed by: FAMILY MEDICINE

## 2020-09-09 PROCEDURE — 99213 OFFICE O/P EST LOW 20 MIN: CPT | Performed by: FAMILY MEDICINE

## 2020-09-09 PROCEDURE — 1090F PRES/ABSN URINE INCON ASSESS: CPT | Performed by: FAMILY MEDICINE

## 2020-09-09 PROCEDURE — 3017F COLORECTAL CA SCREEN DOC REV: CPT | Performed by: FAMILY MEDICINE

## 2020-09-09 PROCEDURE — G8400 PT W/DXA NO RESULTS DOC: HCPCS | Performed by: FAMILY MEDICINE

## 2020-09-09 ASSESSMENT — ENCOUNTER SYMPTOMS
CONSTIPATION: 0
VOMITING: 0
SINUS PRESSURE: 0
ABDOMINAL PAIN: 0
ABDOMINAL DISTENTION: 0
SINUS PAIN: 0
COUGH: 0
WHEEZING: 0
NAUSEA: 0
RHINORRHEA: 0
CHEST TIGHTNESS: 0
DIARRHEA: 0
SHORTNESS OF BREATH: 1

## 2020-09-09 NOTE — PROGRESS NOTES
[facility] PHYSICIANS  [facility] PHYSICIANS [address] [facility].  [address] [address]  Dept: [phone]     2020   Chief Complaint   Patient presents with    Congestive Heart Failure    Follow-Up from Hospital     [facility]  Dary Osborn (:  1950) is a 69 y.o. female is an established patient of Dr. Kristen Martinez. Patient has a history of fall risk, venous insufficiency of both extremities, hearing loss both ears, hypokalemia, and is being anticoagulated. Patient came in with her daughter. Patient has a known difficulty hearing on both ears. She has had a hearing test done. Patient have not purchased any hearing aids. Patient has a significant history of coronary artery disease  in atrial fibrillation. She is currently on Eliquis, Plavix. Patient was trying to reach on 2 something in the cupboards when a, plastic fell on her forehead. Patient denies any headache, she had a small contusion on her forehead. But since patient and patient's family were told that she needs to be seen in the hospital for any type of bleeding or any signs of trauma patient needs to be evaluated in the emergency room. Patient also has a risk of falling due to her mobility issues due to her multiple arthritis. She uses a walker to walk around her house and at home. Patient was found to be climbing up stairs and chairs by the family members. Patient was encouraged to not do any of those since she is at high risk for falling and bleeding. Patient is aware of this. Patient declined to do any physical therapy at this point. She does have a pending stress test and echocardiogram which is yet to be scheduled. Patient also has a known venous insufficiency on both extremities and some recurrent cellulitis of both legs. Patient denies any worsening of the pain, erythema, or any type of infection in both of her legs right now. She does have some mild edema which is nothing new.   She denies any shortness of breath, or dyspnea on exertion. Patient was recently discharged from the hospital due to cystitis and headache. Her potassium had improved since she was admitted. However potassium level still 3.6. Patient had been tried to eat a banana every day. We are going to recheck her electrolyte levels. Daughter is aware that this needs to be done. Lab Results   Component Value Date/Time    K 3.6 (L) 07/30/2020 08:45 AM    K 3.3 (L) 07/27/2020 04:49 AM    K 3.4 (L) 07/26/2020 05:30 AM    K 3.1 (L) 07/25/2020 11:20 AM    K 3.4 (L) 03/11/2020 11:17 AM    K 3.7 12/18/2019 04:32 PM       No data recorded   Counseling given: Yes    Patient Active Problem List   Diagnosis    Vitamin D deficiency    Venous insufficiency of both lower extremities    Type 2 diabetes mellitus with stage 3 chronic kidney disease, without long-term current use of insulin ([address]    Coronary artery disease involving native coronary artery of native heart without angina pectoris    Iron deficiency anemia    Stage 3 chronic kidney disease ([address]    Colonoscopy refused    Pneumococcal vaccine refused    Impaired hearing    Chronic diastolic CHF (congestive heart failure) (HCC)    Chronic obstructive pulmonary disease ([address]    HTN.  Benign hypertension with CKD (chronic kidney disease) stage III (HCC)    Gout with tophi    Hyperlipidemia with target LDL less than 70    Dry skin dermatitis HANDS    Acute CVA (cerebrovascular accident) ([address]    Essential hypertension    Stroke-like episode ([address]    Cellulitis of left lower extremity    Brain mass    Toxic metabolic encephalopathy    Meningioma, cerebral ([address]    New onset atrial fibrillation ([address]    Head contusion    Hypokalemia      Past Medical History:   Diagnosis Date    Arthritis     Cellulitis and abscess     Cellulitis and abscess of unspecified site     CHF (congestive heart failure) (HCC)     Chronic kidney disease, unspecified     Coronary atherosclerosis of native coronary artery     Essential hypertension, malignant     Hard of hearing     Iron deficiency anemia, unspecified     [facility]ocardial infarction [facility]     Other and unspecified hyperlipidemia     Pure hypercholesterolemia     Type II or unspecified type diabetes mellitus without mention of complication, not stated as uncontrolled     Unspecified asthma(493.90)     Unspecified venous (peripheral) insufficiency     Unspecified vitamin D deficiency       Past Surgical History:   Procedure Laterality Date    CORONARY ARTERY BYPASS GRAFT      x 3, Dr. Ancelmo Harrison       Family History   Problem Relation Age of Onset    Diabetes Mother     Heart Disease Mother     Heart Disease Father     Diabetes Sister     High Blood Pressure Sister     Diabetes Maternal Grandmother      Current Outpatient Medications   Medication Sig Dispense Refill    clopidogrel (PLAVIX) 75 MG tablet Take 1 tablet by mouth daily 90 tablet 3    apixaban (ELIQUIS) 5 MG TABS tablet Take 1 tablet by mouth 2 times daily 60 tablet 11    desloratadine (CLARINEX) 5 MG tablet Take 1 tablet by mouth daily Patient needs to contact office before any further refills will be approved 90 tablet 3    furosemide (LASIX) 40 MG tablet TAKE ONE TABLET BY MOUTH TWICE A  tablet 0    metoprolol tartrate (LOPRESSOR) 50 MG tablet Take 1 tablet by mouth 2 times daily 180 tablet 3    Multiple Vitamins-Minerals (THERAPEUTIC MULTIVITAMIN-MINERALS) tablet Take 1 tablet by mouth daily      atorvastatin (LIPITOR) 40 MG tablet Take 1 tablet by mouth daily ** stop simvastatin& fenofibrate ** 90 tablet 3    mineral oil-hydrophilic petrolatum (HYDROPHOR) ointment Apply topically twice a day as needed for dry skin on the hands and forearms 454 g 3    allopurinol (ZYLOPRIM) 300 MG tablet Take 1 tablet by mouth daily Per rheumatologist 90 tablet 0    diclofenac sodium 1 % GEL APPLY 4 GRAMS TOPICALLY THREE TIMES DAILY FOR 30 DAYS      albuterol sulfate HFA (PROVENTIL HFA) 108 (90 Base) MCG/ACT inhaler Inhale 1-2 puffs into the lungs every 4 hours as needed for Wheezing 1 Inhaler 0    Blood Pressure KIT 1 kit by Does not apply route three times daily 1 kit 0    FREESTYLE LANCETS MISC 1 each by Does not apply route daily Patient needs to contact office before any further refills will be approved 90 each 3    blood glucose test strips (FREESTYLE LITE) strip 1 each by In Vitro route daily As needed. 100 each 3    clobetasol (TEMOVATE) 0.05 % ointment Apply topically 2 times daily. For 7days 45 g 0    blood glucose monitor kit and supplies 1 kit by Other route three times daily Dispense Butterfly Elite CBG Device 1 kit 0    ammonium lactate (AMLACTIN) 12 % cream Apply 2 g topically as needed for Dry Skin. Apply topically as needed. No current facility-administered medications for this visit. Medication List          Accurate as of September 9, 2020  5:04 PM. If you have any questions, ask your nurse or doctor. CHANGE how you take these medications    clopidogrel 75 MG tablet  Commonly known as:  PLAVIX  Take 1 tablet by mouth daily  What changed:  Another medication with the same name was removed. Continue taking this medication, and follow the directions you see here.   Changed by:  MAL Denson CNP        CONTINUE taking these medications    albuterol sulfate  (90 Base) MCG/ACT inhaler  Commonly known as:  Proventil HFA  Inhale 1-2 puffs into the lungs every 4 hours as needed for Wheezing     allopurinol 300 MG tablet  Commonly known as:  Zyloprim  Take 1 tablet by mouth daily Per rheumatologist     ammonium lactate 12 % cream  Commonly known as:  AMLACTIN     apixaban 5 MG Tabs tablet  Commonly known as:  ELIQUIS  Take 1 tablet by mouth 2 times daily     atorvastatin 40 MG tablet  Commonly known as:  LIPITOR  Take 1 tablet by mouth daily ** stop simvastatin& fenofibrate **     blood glucose monitor kit and supplies  1 kit by Other route three times daily Dispense Butterfly Elite CBG Device     blood glucose test strips strip  Commonly known as:  FREESTYLE LITE  1 each by In Vitro route daily As needed. Blood Pressure Kit  1 kit by Does not apply route three times daily     clobetasol 0.05 % ointment  Commonly known as:  Temovate  Apply topically 2 times daily. For 7days     desloratadine 5 MG tablet  Commonly known as:  Clarinex  Take 1 tablet by mouth daily Patient needs to contact office before any further refills will be approved     diclofenac sodium 1 % Gel  Commonly known as:  VOLTAREN     FreeStyle Lancets Misc  1 each by Does not apply route daily Patient needs to contact office before any further refills will be approved     furosemide 40 MG tablet  Commonly known as:  LASIX  TAKE ONE TABLET BY MOUTH TWICE A DAY     metoprolol tartrate 50 MG tablet  Commonly known as:  LOPRESSOR  Take 1 tablet by mouth 2 times daily     mineral oil-hydrophilic petrolatum ointment  Apply topically twice a day as needed for dry skin on the hands and forearms     therapeutic multivitamin-minerals tablet        STOP taking these medications    metFORMIN 500 MG tablet  Commonly known as:  GLUCOPHAGE  Stopped by:  Olga Pimentel, APRN - CNP          Review of Systems   Constitutional: Negative for activity change, appetite change, chills, diaphoresis, fatigue, fever and unexpected weight change. HENT: Positive for hearing loss. Negative for congestion, ear discharge, ear pain, postnasal drip, rhinorrhea, sinus pressure, sinus pain and sneezing. Eyes: Positive for visual disturbance. Respiratory: Positive for shortness of breath (FLORES). Negative for cough, chest tightness and wheezing. Cardiovascular: Positive for leg swelling. Negative for chest pain and palpitations.    Gastrointestinal: Negative for abdominal distention, abdominal pain, constipation, diarrhea, nausea and vomiting. Endocrine: Negative for cold intolerance, heat intolerance, polydipsia, polyphagia and polyuria. Genitourinary: Negative for difficulty urinating. Musculoskeletal: Positive for arthralgias and joint swelling (fingers). Allergic/Immunologic: Positive for environmental allergies. Neurological: Positive for headaches. Negative for dizziness. Head contusion   Hematological: Negative for adenopathy. Does not bruise/bleed easily. Psychiatric/Behavioral: Negative for dysphoric mood and sleep disturbance. The patient is nervous/anxious. Prior to Visit Medications    Medication Sig Taking?  Authorizing Provider   clopidogrel (PLAVIX) 75 MG tablet Take 1 tablet by mouth daily Yes Waleska Squires MD   apixaban (ELIQUIS) 5 MG TABS tablet Take 1 tablet by mouth 2 times daily Yes MAL Lim CNP   desloratadine (CLARINEX) 5 MG tablet Take 1 tablet by mouth daily Patient needs to contact office before any further refills will be approved Yes Waleska Squires MD   furosemide (LASIX) 40 MG tablet TAKE ONE TABLET BY MOUTH TWICE A DAY Yes Waleska Squires MD   metoprolol tartrate (LOPRESSOR) 50 MG tablet Take 1 tablet by mouth 2 times daily Yes Waleska Squires MD   Multiple Vitamins-Minerals (THERAPEUTIC MULTIVITAMIN-MINERALS) tablet Take 1 tablet by mouth daily Yes Historical Provider, MD   atorvastatin (LIPITOR) 40 MG tablet Take 1 tablet by mouth daily ** stop simvastatin& fenofibrate ** Yes Waleska Squires MD   mineral oil-hydrophilic petrolatum (HYDROPHOR) ointment Apply topically twice a day as needed for dry skin on the hands and forearms Yes Waleska Squires MD   allopurinol (ZYLOPRIM) 300 MG tablet Take 1 tablet by mouth daily Per rheumatologist Yes Waleska Squires MD   diclofenac sodium 1 % GEL APPLY 4 GRAMS TOPICALLY THREE TIMES DAILY FOR 30 DAYS Yes Historical Provider, MD   albuterol sulfate HFA (PROVENTIL HFA) 108 (90 Base) MCG/ACT inhaler Inhale 1-2 puffs into the lungs every 4 hours as needed for Wheezing Yes Ellie Najera MD   Blood Pressure KIT 1 kit by Does not apply route three times daily Yes Stephanie Mason MD   FREESTYLE LANCETS MISC 1 each by Does not apply route daily Patient needs to contact office before any further refills will be approved Yes Stephanie Mason MD   blood glucose test strips (FREESTYLE LITE) strip 1 each by In Vitro route daily As needed. Yes Stephanie Mason MD   clobetasol (TEMOVATE) 0.05 % ointment Apply topically 2 times daily. For 7days Yes Stephanie Mason MD   blood glucose monitor kit and supplies 1 kit by Other route three times daily Dispense Butterfly Elite CBG Device Yes Scar Mtz MD   ammonium lactate (AMLACTIN) 12 % cream Apply 2 g topically as needed for Dry Skin. Apply topically as needed. Yes Historical Provider, MD      Social History     Tobacco Use    Smoking status: Former Smoker     Packs/day: 0.25     Years: 10.00     Pack years: 2.50     Last attempt to quit: 2000     Years since quittin.7    Smokeless tobacco: Never Used   Substance Use Topics    Alcohol use: Yes     Alcohol/week: 0.0 standard drinks      Vitals:    20 1510   BP: 110/76   Pulse: 74   SpO2: 97%   Weight: 138 lb 9.6 oz (62.9 kg)   Height: 4' 11\" (1.499 m)     Estimated body mass index is 27.99 kg/m² as calculated from the following:    Height as of this encounter: 4' 11\" (1.499 m). Weight as of this encounter: 138 lb 9.6 oz (62.9 kg). Physical Exam  Vitals signs and nursing note reviewed. Constitutional:       General: She is not in acute distress. Appearance: Normal appearance. She is well-developed. She is not diaphoretic. HENT:      Head: Normocephalic and atraumatic. Comments: contusion     Right Ear: External ear normal. Decreased hearing noted. No middle ear effusion. Tympanic membrane is scarred. Left Ear: External ear normal. Decreased hearing noted.   No middle ear effusion. Tympanic membrane is scarred. Ears:      Comments: Screening by finger rub bilaterally very decreased unable to perceive the finger rub. Nose: Congestion present. No mucosal edema or rhinorrhea. Right Sinus: No maxillary sinus tenderness or frontal sinus tenderness. Left Sinus: No maxillary sinus tenderness or frontal sinus tenderness. Mouth/Throat:      Mouth: Mucous membranes are moist.      Pharynx: No posterior oropharyngeal erythema. Eyes:      General: Lids are normal. No scleral icterus. Right eye: No discharge. Left eye: No discharge. Conjunctiva/sclera: Conjunctivae normal.   Neck:      Musculoskeletal: Normal range of motion and neck supple. Thyroid: No thyromegaly. Cardiovascular:      Rate and Rhythm: Normal rate and regular rhythm. Heart sounds: Murmur present. Crescendo  systolic murmur present with a grade of 3/6. Comments: Erythema both legs, no open skin, not warm to touch. Mild edema- chronic  Pulmonary:      Effort: Pulmonary effort is normal. No respiratory distress. Breath sounds: Normal breath sounds. No wheezing or rales. Chest:      Chest wall: No tenderness. Abdominal:      General: Bowel sounds are normal. There is no distension. Palpations: Abdomen is soft. There is no hepatomegaly or splenomegaly. Tenderness: There is no abdominal tenderness. Musculoskeletal: Normal range of motion. General: Tenderness and deformity present. Right lower le+ Pitting Edema present. Left lower le+ Pitting Edema present. Skin:     General: Skin is warm and dry. Capillary Refill: Capillary refill takes less than 2 seconds. Findings: No rash. Neurological:      Mental Status: She is alert and oriented to person, place, and time. Cranial Nerves: No cranial nerve deficit. Motor: No abnormal muscle tone.       Gait: Gait abnormal.   Psychiatric:         Mood and Affect: Mood normal.         Behavior: Behavior normal.         Thought Content: Thought content normal.         Judgment: Judgment normal.       Most recent labs reviewed with patient, and all questions answered. Lab Results   Component Value Date    WBC 9.7 07/30/2020    HGB 14.3 07/30/2020    HCT 44.6 07/30/2020    MCV 90.1 07/30/2020     07/30/2020     Lab Results   Component Value Date     07/30/2020    K 3.6 07/30/2020     07/30/2020    CO2 25 07/30/2020    BUN 36 07/30/2020    CREATININE 0.83 07/30/2020    GLUCOSE 114 07/30/2020    CALCIUM 9.2 07/30/2020      Lab Results   Component Value Date    ALT 16 07/27/2020    AST 24 07/27/2020    ALKPHOS 174 (H) 07/27/2020    BILITOT 0.54 07/27/2020     Lab Results   Component Value Date    TSH 1.39 07/27/2020     Lab Results   Component Value Date    CHOL 117 07/27/2020    CHOL 188 03/11/2020    CHOL 159 02/27/2019     Lab Results   Component Value Date    TRIG 112 07/27/2020    TRIG 279 (H) 03/11/2020    TRIG 214 (H) 02/27/2019     Lab Results   Component Value Date    HDL 48 07/27/2020    HDL 55 03/11/2020    HDL 48 02/27/2019     Lab Results   Component Value Date    LDLCHOLESTEROL 47 07/27/2020    LDLCHOLESTEROL 77 03/11/2020    LDLCHOLESTEROL 68 02/27/2019     Lab Results   Component Value Date    CHOLHDLRATIO 2.4 07/27/2020    CHOLHDLRATIO 3.4 03/11/2020    CHOLHDLRATIO 3.3 02/27/2019     Lab Results   Component Value Date    LABA1C 8.3 (H) 07/27/2020      Lab Results   Component Value Date    VWHYGNBF52 6512 03/11/2020     Lab Results   Component Value Date    FOLATE >20.0 03/11/2020     Lab Results   Component Value Date    VITD25 26.0 (L) 03/11/2020     ASSESSMENT/PLAN:  1. Contusion of other part of head, initial encounter  Improving  Heat and cold packs  Advised to monitor    2. Anticoagulant long-term use  Likely stable  Adherent to therapy      3. Venous insufficiency of both lower extremities  Stable  No signs of infections    4.  Other specified hearing loss of both ears  Ongoing  Needs hearing aide  May not afford it    5. Hypokalemia  Likely stable  Will recheck levels  - Basic Metabolic Panel; Future    6. At risk for falls  Ongoing  Declined PT/OT  In walker  Lives with son     Controlled Substance Monitoring:  Acute and Chronic Pain Monitoring:   No flowsheet data found. Orders Placed This Encounter   Procedures    Basic Metabolic Panel     Standing Status:   Future     Standing Expiration Date:   9/9/2021     No orders of the defined types were placed in this encounter. Medications Discontinued During This Encounter   Medication Reason    metFORMIN (GLUCOPHAGE) 500 MG tablet     clopidogrel (PLAVIX) 75 MG tablet Therapy completed    Blood Pressure KIT LIST CLEANUP      Health Maintenance Due   Topic Date Due    DTaP/Tdap/Td vaccine (1 - Tdap) 08/16/1968    Shingles Vaccine (1 of 2) 08/16/1999    Pneumococcal 65+ years Vaccine (1 of 1 - PPSV23) 08/16/2014    Diabetic retinal exam  09/09/2015    Colon Cancer Screen FIT/FOBT  03/29/2018    Diabetic foot exam  10/01/2019    Diabetic microalbuminuria test  02/27/2020    Flu vaccine (1) 09/01/2020      Return for Keep Prev Appt, Appt w/ Dr. Felice Yancey, October, A1c. Mere Miller received counseling on the following healthy behaviors: nutrition, exercise and medication adherence  Reviewed prior labs and health maintenance  Continue current medications, diet and exercise. Discussed use, benefit, and side effects of prescribed medications. Barriers to medication compliance addressed. Patient given educational materials - see patient instructions  Was a self-tracking handout given in paper form or via [employer]? Yes    Requested Prescriptions      No prescriptions requested or ordered in this encounter       All patient questions answered. Patient voiced understanding. Quality Measures    Body mass index is 27.99 kg/m². Elevated.  Weight control planned discussed Healthy diet and regular exercise. BP: 110/76 Blood pressure is normal. Treatment plan consists of No treatment change needed. Lab Results   Component Value Date    LDLCHOLESTEROL 47 07/27/2020    (goal LDL reduction with dx if diabetes is 50% LDL reduction)      PHQ Scores 2/20/2020 1/10/2020 2/13/2019 10/1/2018 3/3/2017 3/9/2015   PHQ2 Score 0 0 0 0 0 0   PHQ9 Score 0 0 0 0 0 0     Interpretation of Total Score Depression Severity: 1-4 = Minimal depression, 5-9 = Mild depression, 10-14 = Moderate depression, 15-19 = Moderately severe depression, 20-27 = Severe depression   This note was completed by using the assistance of a speech-recognition program. However, inadvertent computerized transcription errors may be present. Although every effort was made to ensure accuracy, no guarantees can be provided that every mistake has been identified and corrected by editing   An electronic signature was used to authenticate this note.   --Aga Avendano, APRN - CNP on 9/9/2020 at 5:04 PM

## 2020-09-09 NOTE — PATIENT INSTRUCTIONS
Patient Education        Potassium-Rich Diet: Care Instructions  Your Care Instructions     Potassium is a mineral. It helps keep the right mix of fluids in your body. It also helps your nerves and muscles work as they should. You'll find it in milk and meats. It's also in all fresh foods, including fruits and vegetables. Most adults need about 5 grams of potassium a day. The foods you eat should supply all that you need. Some health conditions can cause a loss of potassium. For example, kidney problems and stomach problems with vomiting and diarrhea can cause you to lose this mineral. Some medicines, such as water pills (diuretics), can cause low potassium. If you can't get enough potassium from what you eat, your doctor may advise you to take supplements. Follow-up care is a key part of your treatment and safety. Be sure to make and go to all appointments, and call your doctor if you are having problems. It's also a good idea to know your test results and keep a list of the medicines you take. How can you care for yourself at home? · Plan your diet around foods that are rich in potassium. Fresh, unprocessed whole foods have the most. These foods include:  ? Milk and other dairy products. ? Vegetables, especially broccoli, cooked dry beans, tomatoes, potatoes, artichokes, winter squash, and spinach. ? Fruits, especially citrus fruits, bananas, and apricots. Dried apricots contain more potassium than fresh apricots. ? Meat, poultry, and fish. · Ask your doctor about using a salt substitute or \"light\" salt. These often contain potassium. Where can you learn more? Go to [email or web address]://Flittokeri.Inktank. org and sign in to your [employer] account. Enter H315 in the [facility] box to learn more about \"Potassium-Rich Diet: Care Instructions. \"     If you do not have an account, please click on the \"Sign Up Now\" link.   Current as of: August 22, 2019               Content Version: 12.5  © 1044-6707 Healthwise, Incorporated. Care instructions adapted under license by [facility] ([facility]). If you have questions about a medical condition or this instruction, always ask your healthcare professional. [address] any warranty or liability for your use of this information. fair balance

## 2020-09-09 NOTE — PROGRESS NOTES
Visit Information    Have you changed or started any medications since your last visit including any over-the-counter medicines, vitamins, or herbal medicines? no   Are you having any side effects from any of your medications? -  no  Have you stopped taking any of your medications? Is so, why? -  no    Have you seen any other physician or provider since your last visit? No  Have you had any other diagnostic tests since your last visit? Yes - Records Obtained  Have you been seen in the emergency room and/or had an admission to a hospital since we last saw you? Yes - Records Obtained  Have you had your routine dental cleaning in the past 6 months? no    Have you activated your [facility] account? If not, what are your barriers?  No:      Patient Care Team:  Akil Hoffman MD as PCP - General (Family Medicine)  Akil Hoffman MD as PCP - [facility]  Allen Aguayo MD as Consulting Physician (Cardiology)  Tristen Rueda MD as Consulting Physician (Rheumatology)    Medical History Review  Past Medical, Family, and Social History reviewed and does contribute to the patient presenting condition    Health Maintenance   Topic Date Due    DTaP/Tdap/Td vaccine (1 - Tdap) 08/16/1968    Shingles Vaccine (1 of 2) 08/16/1999    Pneumococcal 65+ years Vaccine (1 of 1 - PPSV23) 08/16/2014    Diabetic retinal exam  09/09/2015    Colon Cancer Screen FIT/FOBT  03/29/2018    Diabetic foot exam  10/01/2019    Diabetic microalbuminuria test  02/27/2020    Flu vaccine (1) 09/01/2020    Annual Wellness Visit (AWV)  01/10/2022 (Originally 5/29/2019)    A1C test (Diabetic or Prediabetic)  07/27/2021    Lipid screen  07/27/2021    Potassium monitoring  07/30/2021    Creatinine monitoring  07/30/2021    Breast cancer screen  08/28/2022    DEXA (modify frequency per FRAX score)  Completed    Hepatitis C screen  Completed    Hepatitis A vaccine  Aged Out    Hib vaccine  Aged Out    Meningococcal (ACWY) vaccine  Aged Out

## 2020-09-11 RX ORDER — FUROSEMIDE 40 MG/1
TABLET ORAL
Qty: 180 TABLET | Refills: 0 | Status: SHIPPED | OUTPATIENT
Start: 2020-09-11 | End: 2020-10-01 | Stop reason: SDUPTHER

## 2020-09-11 NOTE — TELEPHONE ENCOUNTER
[facility] Once 02/27/2020   AFL - Anna Jimenez MD, Cardiology, [place] Once 02/27/2020   BUN & Creatinine Once 03/26/2020   Calcium Once 03/26/2020   ELECTROLYTES Once 03/26/2020   CBC Auto Differential Once 03/26/2020   Magnesium Once 03/26/2020   Phosphorus Once 03/26/2020   Microalbumin / Creatinine Urine Ratio Once 03/26/2020   Vitamin D 25 Hydroxy Once 76/24/0976   Basic Metabolic Panel Once 24/18/7163               Patient Active Problem List:     Vitamin D deficiency     Venous insufficiency of both lower extremities     Type 2 diabetes mellitus with stage 3 chronic kidney disease, without long-term current use of insulin ([facility])     Coronary artery disease involving native coronary artery of native heart without angina pectoris     Iron deficiency anemia     Stage 3 chronic kidney disease ([address]     Colonoscopy refused     Pneumococcal vaccine refused     Impaired hearing     Chronic diastolic CHF (congestive heart failure) ([address]     Chronic obstructive pulmonary disease ([address]     HTN.  Benign hypertension with CKD (chronic kidney disease) stage III ([facility])     Gout with tophi     Hyperlipidemia with target LDL less than 70     Dry skin dermatitis HANDS     Acute CVA (cerebrovascular accident) ([address]     Essential hypertension     Stroke-like episode ([address]     Cellulitis of left lower extremity     Brain mass     Toxic metabolic encephalopathy     Meningioma, cerebral ([address]     New onset atrial fibrillation ([address]     Head contusion     Hypokalemia

## 2020-09-25 ENCOUNTER — HOSPITAL ENCOUNTER (OUTPATIENT)
Age: 70
Discharge: HOME | End: 2020-09-25
Payer: MEDICARE

## 2020-09-25 LAB
ABSOLUTE EOS #: 0.2 K/UL (ref 0–0.4)
ABSOLUTE IMMATURE GRANULOCYTE: ABNORMAL K/UL (ref 0–0.3)
ABSOLUTE LYMPH #: 1.6 K/UL (ref 1–4.8)
ABSOLUTE MONO #: 0.5 K/UL (ref 0.1–1.3)
ANION GAP SERPL CALCULATED.3IONS-SCNC: 12 MMOL/L (ref 9–17)
BASOPHILS # BLD: 1 % (ref 0–2)
BASOPHILS ABSOLUTE: 0 K/UL (ref 0–0.2)
BUN BLDV-MCNC: 50 MG/DL (ref 8–23)
CALCIUM SERPL-MCNC: 9.4 MG/DL (ref 8.6–10.4)
CHLORIDE BLD-SCNC: 103 MMOL/L (ref 98–107)
CO2: 26 MMOL/L (ref 20–31)
CREAT SERPL-MCNC: 0.8 MG/DL (ref 0.5–0.9)
DIFFERENTIAL TYPE: ABNORMAL
EOSINOPHILS RELATIVE PERCENT: 3 % (ref 0–4)
GFR AFRICAN AMERICAN: >60 ML/MIN
GFR NON-AFRICAN AMERICAN: >60 ML/MIN
GFR SERPL CREATININE-BSD FRML MDRD: ABNORMAL ML/MIN/{1.73_M2}
GFR SERPL CREATININE-BSD FRML MDRD: ABNORMAL ML/MIN/{1.73_M2}
HCT VFR BLD CALC: 42.8 % (ref 36–46)
HEMOGLOBIN: 14 G/DL (ref 12–16)
IMMATURE GRANULOCYTES: ABNORMAL %
LYMPHOCYTES # BLD: 22 % (ref 24–44)
MAGNESIUM: 1.5 MG/DL (ref 1.6–2.6)
MCH RBC QN AUTO: 30.5 PG (ref 26–34)
MCHC RBC AUTO-ENTMCNC: 32.8 G/DL (ref 31–37)
MCV RBC AUTO: 92.9 FL (ref 80–100)
MONOCYTES # BLD: 7 % (ref 1–7)
NRBC AUTOMATED: ABNORMAL PER 100 WBC
PDW BLD-RTO: 16.8 % (ref 11.5–14.9)
PHOSPHORUS: 4.1 MG/DL (ref 2.6–4.5)
PLATELET # BLD: 190 K/UL (ref 150–450)
PLATELET ESTIMATE: ABNORMAL
PMV BLD AUTO: 8.7 FL (ref 6–12)
POTASSIUM SERPL-SCNC: 3.9 MMOL/L (ref 3.7–5.3)
RBC # BLD: 4.6 M/UL (ref 4–5.2)
RBC # BLD: ABNORMAL 10*6/UL
SEG NEUTROPHILS: 67 % (ref 36–66)
SEGMENTED NEUTROPHILS ABSOLUTE COUNT: 4.8 K/UL (ref 1.3–9.1)
SODIUM BLD-SCNC: 141 MMOL/L (ref 135–144)
WBC # BLD: 7.1 K/UL (ref 3.5–11)
WBC # BLD: ABNORMAL 10*3/UL

## 2020-09-25 PROCEDURE — 82565 ASSAY OF CREATININE: CPT

## 2020-09-25 PROCEDURE — 85025 COMPLETE CBC W/AUTO DIFF WBC: CPT

## 2020-09-25 PROCEDURE — 84100 ASSAY OF PHOSPHORUS: CPT

## 2020-09-25 PROCEDURE — 82310 ASSAY OF CALCIUM: CPT

## 2020-09-25 PROCEDURE — 82306 VITAMIN D 25 HYDROXY: CPT

## 2020-09-25 PROCEDURE — 36415 COLL VENOUS BLD VENIPUNCTURE: CPT

## 2020-09-25 PROCEDURE — 83735 ASSAY OF MAGNESIUM: CPT

## 2020-09-25 PROCEDURE — 84520 ASSAY OF UREA NITROGEN: CPT

## 2020-09-25 PROCEDURE — 80051 ELECTROLYTE PANEL: CPT

## 2020-09-26 LAB — VITAMIN D 25-HYDROXY: 18.1 NG/ML (ref 30–100)

## 2020-09-30 ENCOUNTER — TELEPHONE (OUTPATIENT)
Dept: FAMILY MEDICINE CLINIC | Age: 70
End: 2020-09-30

## 2020-09-30 NOTE — TELEPHONE ENCOUNTER
Please advise the patient she will need appointment with her cardiologist, Dr. Maggie Escobar group to be cleared for surgery and to hold the blood thinners. If she needs a new referral to [facility] to let me know.   She is on both Plavix and Eliquis, will need input from cardiologist    Current Outpatient Medications on File Prior to Visit   Medication Sig Dispense Refill    vitamin D (ERGOCALCIFEROL) 1.25 MG (13892 UT) CAPS capsule Take 1 capsule by mouth once a week 24 capsule 0    furosemide (LASIX) 40 MG tablet TAKE ONE TABLET BY MOUTH TWICE A  tablet 0    clopidogrel (PLAVIX) 75 MG tablet Take 1 tablet by mouth daily 90 tablet 3    apixaban (ELIQUIS) 5 MG TABS tablet Take 1 tablet by mouth 2 times daily 60 tablet 11    desloratadine (CLARINEX) 5 MG tablet Take 1 tablet by mouth daily Patient needs to contact office before any further refills will be approved 90 tablet 3    metoprolol tartrate (LOPRESSOR) 50 MG tablet Take 1 tablet by mouth 2 times daily 180 tablet 3    Multiple Vitamins-Minerals (THERAPEUTIC MULTIVITAMIN-MINERALS) tablet Take 1 tablet by mouth daily      atorvastatin (LIPITOR) 40 MG tablet Take 1 tablet by mouth daily ** stop simvastatin& fenofibrate ** 90 tablet 3    mineral oil-hydrophilic petrolatum (HYDROPHOR) ointment Apply topically twice a day as needed for dry skin on the hands and forearms 454 g 3    allopurinol (ZYLOPRIM) 300 MG tablet Take 1 tablet by mouth daily Per rheumatologist 90 tablet 0    diclofenac sodium 1 % GEL APPLY 4 GRAMS TOPICALLY THREE TIMES DAILY FOR 30 DAYS      albuterol sulfate HFA (PROVENTIL HFA) 108 (90 Base) MCG/ACT inhaler Inhale 1-2 puffs into the lungs every 4 hours as needed for Wheezing 1 Inhaler 0    Blood Pressure KIT 1 kit by Does not apply route three times daily 1 kit 0    FREESTYLE LANCETS MISC 1 each by Does not apply route daily Patient needs to contact office before any further refills will be approved 90 each 3    blood glucose test strips (FREESTYLE LITE) strip 1 each by In Vitro route daily As needed. 100 each 3    clobetasol (TEMOVATE) 0.05 % ointment Apply topically 2 times daily. For 7days 45 g 0    blood glucose monitor kit and supplies 1 kit by Other route three times daily Dispense Butterfly Elite CBG Device 1 kit 0    ammonium lactate (AMLACTIN) 12 % cream Apply 2 g topically as needed for Dry Skin. Apply topically as needed. No current facility-administered medications on file prior to visit.

## 2020-09-30 NOTE — TELEPHONE ENCOUNTER
Received a fax request for medical clearance. Also please advise if patient is able to hold blood thinners and aspirin one week prior to surgery.

## 2020-10-01 ENCOUNTER — OFFICE VISIT (OUTPATIENT)
Dept: FAMILY MEDICINE CLINIC | Age: 70
End: 2020-10-01
Payer: MEDICARE

## 2020-10-01 VITALS
SYSTOLIC BLOOD PRESSURE: 100 MMHG | HEIGHT: 59 IN | DIASTOLIC BLOOD PRESSURE: 60 MMHG | BODY MASS INDEX: 28.22 KG/M2 | OXYGEN SATURATION: 98 % | WEIGHT: 140 LBS | HEART RATE: 68 BPM

## 2020-10-01 PROCEDURE — G8427 DOCREV CUR MEDS BY ELIG CLIN: HCPCS | Performed by: FAMILY MEDICINE

## 2020-10-01 PROCEDURE — G8417 CALC BMI ABV UP PARAM F/U: HCPCS | Performed by: FAMILY MEDICINE

## 2020-10-01 PROCEDURE — 1123F ACP DISCUSS/DSCN MKR DOCD: CPT | Performed by: FAMILY MEDICINE

## 2020-10-01 PROCEDURE — G8484 FLU IMMUNIZE NO ADMIN: HCPCS | Performed by: FAMILY MEDICINE

## 2020-10-01 PROCEDURE — 1036F TOBACCO NON-USER: CPT | Performed by: FAMILY MEDICINE

## 2020-10-01 PROCEDURE — 2022F DILAT RTA XM EVC RTNOPTHY: CPT | Performed by: FAMILY MEDICINE

## 2020-10-01 PROCEDURE — 3017F COLORECTAL CA SCREEN DOC REV: CPT | Performed by: FAMILY MEDICINE

## 2020-10-01 PROCEDURE — 99215 OFFICE O/P EST HI 40 MIN: CPT | Performed by: FAMILY MEDICINE

## 2020-10-01 PROCEDURE — 3052F HG A1C>EQUAL 8.0%<EQUAL 9.0%: CPT | Performed by: FAMILY MEDICINE

## 2020-10-01 PROCEDURE — 4040F PNEUMOC VAC/ADMIN/RCVD: CPT | Performed by: FAMILY MEDICINE

## 2020-10-01 PROCEDURE — 1090F PRES/ABSN URINE INCON ASSESS: CPT | Performed by: FAMILY MEDICINE

## 2020-10-01 PROCEDURE — G8400 PT W/DXA NO RESULTS DOC: HCPCS | Performed by: FAMILY MEDICINE

## 2020-10-01 RX ORDER — FUROSEMIDE 40 MG/1
TABLET ORAL
Qty: 180 TABLET | Refills: 3 | Status: ON HOLD | OUTPATIENT
Start: 2020-10-01 | End: 2021-02-18 | Stop reason: ALTCHOICE

## 2020-10-01 RX ORDER — DOXYCYCLINE HYCLATE 100 MG
100 TABLET ORAL 2 TIMES DAILY
Qty: 20 TABLET | Refills: 0 | Status: SHIPPED | OUTPATIENT
Start: 2020-10-01 | End: 2020-10-11

## 2020-10-01 RX ORDER — DESLORATADINE 5 MG/1
5 TABLET ORAL DAILY
Qty: 90 TABLET | Refills: 3 | Status: SHIPPED | OUTPATIENT
Start: 2020-10-01 | End: 2021-03-19 | Stop reason: SDUPTHER

## 2020-10-01 ASSESSMENT — ENCOUNTER SYMPTOMS
CHEST TIGHTNESS: 0
DIARRHEA: 0
NAUSEA: 0
ROS SKIN COMMENTS: DRY SKIN
WHEEZING: 0
ABDOMINAL PAIN: 0
SHORTNESS OF BREATH: 0
CONSTIPATION: 0
VOMITING: 0
BACK PAIN: 1
COUGH: 0
ABDOMINAL DISTENTION: 0

## 2020-10-01 NOTE — PROGRESS NOTES
Hyperlipidemia:  No new myalgias or GI upset on atorvastatin (Lipitor). Medication compliance: compliant all of the time. Patient is  following a low fat, low cholesterol diet. LDL is normal   Lab Results   Component Value Date    LDLCHOLESTEROL 47 07/27/2020     Lab Results   Component Value Date    TRIG 112 07/27/2020    TRIG 279 (H) 03/11/2020    TRIG 214 (H) 02/27/2019     Fannie Schmidt complains of worsening redness, swelling and pain in the right leg, she things she has cellulitis again  She says she first had cellulitis 20 years ago, in the right leg. She says she has been swollen \"for 5 months\"  She says she has been getting Blisters on the posterior leg, and she also has multiple scabs  When asked how long has she had the blisters, she says the blisters are sipping for 3-4 months. She also says her legs feel Itching, and dry. Denies fever, chills or night sweats. Has known diabetes mellitus type 2  Was started back on Metformin, she says she has been taking 500 mg BID  Denies increased appetite, thirst or polyuria. Home Blood Glucose  Have improved since back on Metformin: 115, 130, 117  A1c worsening    Lab Results   Component Value Date    LABA1C 8.3 (H) 07/27/2020    LABA1C 5.8 12/18/2019    LABA1C 5.8 02/27/2019     High urine microalbumin    Lab Results   Component Value Date    LABMICR 155 (H) 02/27/2019       I have recommended that this patient have a flu shot and immunization for pneumonia, but she declines at this time. I have discussed the risks and benefits of this procedure with her. The patient verbalizes understanding.     She needs clearance for cataract surgery            Current Outpatient Medications   Medication Sig Dispense Refill    vitamin D (ERGOCALCIFEROL) 1.25 MG (81926 UT) CAPS capsule Take 1 capsule by mouth once a week 24 capsule 0    furosemide (LASIX) 40 MG tablet TAKE ONE TABLET BY MOUTH TWICE A  tablet 0    clopidogrel (PLAVIX) 75 MG tablet Take 1 tablet by mouth daily 90 tablet 3    apixaban (ELIQUIS) 5 MG TABS tablet Take 1 tablet by mouth 2 times daily 60 tablet 11    desloratadine (CLARINEX) 5 MG tablet Take 1 tablet by mouth daily Patient needs to contact office before any further refills will be approved 90 tablet 3    metoprolol tartrate (LOPRESSOR) 50 MG tablet Take 1 tablet by mouth 2 times daily 180 tablet 3    Multiple Vitamins-Minerals (THERAPEUTIC MULTIVITAMIN-MINERALS) tablet Take 1 tablet by mouth daily      atorvastatin (LIPITOR) 40 MG tablet Take 1 tablet by mouth daily ** stop simvastatin& fenofibrate ** 90 tablet 3    allopurinol (ZYLOPRIM) 300 MG tablet Take 1 tablet by mouth daily Per rheumatologist 90 tablet 0    diclofenac sodium 1 % GEL APPLY 4 GRAMS TOPICALLY THREE TIMES DAILY FOR 30 DAYS      albuterol sulfate HFA (PROVENTIL HFA) 108 (90 Base) MCG/ACT inhaler Inhale 1-2 puffs into the lungs every 4 hours as needed for Wheezing 1 Inhaler 0    Blood Pressure KIT 1 kit by Does not apply route three times daily 1 kit 0    FREESTYLE LANCETS MISC 1 each by Does not apply route daily Patient needs to contact office before any further refills will be approved 90 each 3    blood glucose test strips (FREESTYLE LITE) strip 1 each by In Vitro route daily As needed. 100 each 3    blood glucose monitor kit and supplies 1 kit by Other route three times daily Dispense Butterfly Elite CBG Device 1 kit 0    mineral oil-hydrophilic petrolatum (HYDROPHOR) ointment Apply topically twice a day as needed for dry skin on the hands and forearms (Patient not taking: Reported on 10/1/2020) 454 g 3    clobetasol (TEMOVATE) 0.05 % ointment Apply topically 2 times daily. For 7days (Patient not taking: Reported on 10/1/2020) 45 g 0    ammonium lactate (AMLACTIN) 12 % cream Apply 2 g topically as needed for Dry Skin. Apply topically as needed. No current facility-administered medications for this visit.       Past Medical History:   Diagnosis Date    Acute CVA [facility]cerebrovascular accident) [facility] 2020    Arthritis     Brain mass     Cellulitis and abscess     Cellulitis and abscess of unspecified site     Cellulitis of left lower extremity 2020    CHF (congestive heart failure) (HCC)     Chronic kidney disease, unspecified     Coronary atherosclerosis of native coronary artery     Essential hypertension 2020    Essential hypertension, malignant     Hard of hearing     Hypokalemia 2020    Iron deficiency anemia, unspecified     Myocardial infarction ([facility]     Other and unspecified hyperlipidemia     Pure hypercholesterolemia     Toxic metabolic encephalopathy     Type II or unspecified type diabetes mellitus without mention of complication, not stated as uncontrolled     Unspecified asthma(493.90)     Unspecified venous (peripheral) insufficiency     Unspecified vitamin D deficiency           Social History     Tobacco Use    Smoking status: Former Smoker     Packs/day: 0.25     Years: 10.00     Pack years: 2.50     Last attempt to quit: 2000     Years since quittin.7    Smokeless tobacco: Never Used   Substance Use Topics    Alcohol use: Yes     Alcohol/week: 0.0 standard drinks    Drug use: No       Counseling given: Yes                    The patient's past medical, surgical, social, and family history as well as her current medications and allergies were reviewed as documented in today's encounter. Restof complaints with corresponding details per ROS    Review of Systems   Constitutional: Positive for fatigue. Negative for activity change, appetite change, chills, diaphoresis, fever and unexpected weight change. Eyes: Positive for visual disturbance (cataracts). Respiratory: Negative for cough, chest tightness, shortness of breath and wheezing. Cardiovascular: Positive for palpitations and leg swelling. Negative for chest pain.    Gastrointestinal: Negative for abdominal distention, abdominal pain, blood in stool, constipation, diarrhea, nausea and vomiting. Endocrine: Negative for cold intolerance, heat intolerance, polydipsia, polyphagia and polyuria. Musculoskeletal: Positive for back pain. Thoracic Kyphosis  Ambulates with walker   Skin: Positive for rash (legs). Dry skin   Neurological: Positive for numbness (feet and legs). Hematological: Does not bruise/bleed easily. Psychiatric/Behavioral: The patient is nervous/anxious.          -vital signs stable and within normal limits except Overweight per BMI. /60   Pulse 68   Ht 4' 11\" (1.499 m)   Wt 140 lb (63.5 kg)   SpO2 98%   BMI 28.28 kg/m²        Physical Exam  Vitals signs and nursing note reviewed. Constitutional:       General: She is not in acute distress. Appearance: Normal appearance. She is well-developed. She is not diaphoretic. HENT:      Head: Normocephalic and atraumatic. Right Ear: External ear normal.      Left Ear: External ear normal.      Nose: Nose normal. No mucosal edema. Mouth/Throat:      Mouth: Mucous membranes are moist.      Pharynx: No posterior oropharyngeal erythema. Eyes:      General: Lids are normal. No scleral icterus. Right eye: No discharge. Left eye: No discharge. Conjunctiva/sclera: Conjunctivae normal.   Neck:      Musculoskeletal: Normal range of motion and neck supple. Thyroid: No thyromegaly. Cardiovascular:      Rate and Rhythm: Normal rate. Rhythm irregular. Heart sounds: Normal heart sounds. No murmur. Comments: Her daughter says she has compression stockings at home, but not wearing. We discussed to placed them when she first wakes up. Pulmonary:      Effort: Pulmonary effort is normal. No respiratory distress. Breath sounds: Normal breath sounds. No wheezing or rales. Chest:      Chest wall: No tenderness. Abdominal:      General: Bowel sounds are normal. There is no distension. Palpations: Abdomen is soft. There is no hepatomegaly or splenomegaly. Tenderness: There is no abdominal tenderness. Musculoskeletal: Normal range of motion. General: Deformity present. No tenderness. Right lower le+ Pitting Edema present. Left lower le+ Pitting Edema present. Skin:     General: Skin is warm and dry. Capillary Refill: Capillary refill takes less than 2 seconds. Findings: Rash present. Comments: Extensive stasis dermatitis bilateral legs, reddish rash bilateral   On the right leg there is extensive erythematous swollen rash, warm, with one sipping small blister, clear fluid, 5 mm, and a few scabbed lesions, all on the right leg, consistent with cellulitis. Neurological:      Mental Status: She is alert and oriented to person, place, and time. Cranial Nerves: No cranial nerve deficit. Motor: No abnormal muscle tone. Psychiatric:         Mood and Affect: Mood is anxious. Speech: Speech is rapid and pressured. Behavior: Behavior normal.         Thought Content: Thought content normal.         Judgment: Judgment normal.      Comments: Has many questions, talking about all her past medical problems            Discussed testing with the patient and all questions fully answered. I personally reviewed testing with patient.   Kidney disease stage III improved based on her age and GFR, BUN and creatinine  Hyperglycemia  Vitamin D deficiency    Otherwise labs within normal limits          Lab Results   Component Value Date    WBC 7.1 2020    HGB 14.0 2020    HCT 42.8 2020    MCV 92.9 2020     2020       Lab Results   Component Value Date     2020    K 3.9 2020     2020    CO2 26 2020    BUN 50 2020    CREATININE 0.80 2020    GLUCOSE 114 2020    CALCIUM 9.4 2020        Lab Results   Component Value Date    ALT 16 2020    AST 24 07/27/2020    ALKPHOS 174 (H) 07/27/2020    BILITOT 0.54 07/27/2020       Lab Results   Component Value Date    TSH 1.39 07/27/2020       Lab Results   Component Value Date    CHOL 117 07/27/2020    CHOL 188 03/11/2020    CHOL 159 02/27/2019     Lab Results   Component Value Date    TRIG 112 07/27/2020    TRIG 279 (H) 03/11/2020    TRIG 214 (H) 02/27/2019     Lab Results   Component Value Date    HDL 48 07/27/2020    HDL 55 03/11/2020    HDL 48 02/27/2019     Lab Results   Component Value Date    LDLCHOLESTEROL 47 07/27/2020    LDLCHOLESTEROL 77 03/11/2020    LDLCHOLESTEROL 68 02/27/2019       Lab Results   Component Value Date    CHOLHDLRATIO 2.4 07/27/2020    CHOLHDLRATIO 3.4 03/11/2020    CHOLHDLRATIO 3.3 02/27/2019         Lab Results   Component Value Date    KELSMCIX42 1194 03/11/2020     Lab Results   Component Value Date    FOLATE >20.0 03/11/2020     Lab Results   Component Value Date    VITD25 18.1 (L) 09/25/2020           ASSESSMENT AND PLAN      1. Benign hypertension with CKD (chronic kidney disease) stage III  Improved chronic kidney disease stage III  Well controlled. Continue current treatment. Will recheck labs. Continue metoprolol 50 mg twice a day  - furosemide (LASIX) 40 MG tablet; TAKE ONE TABLET BY MOUTH TWICE A DAY  Dispense: 180 tablet; Refill: 3    2. Chronic diastolic CHF (congestive heart failure) (HCC)  Stable  Lab Results   Component Value Date    LVEF 48 07/30/2020   Continue metoprolol, furosemide  Follow-up with cardiologist for clearance, then will clear the patient for cataract surgery  Blood pressure, weight, pulse daily monitoring advised  - AFL - Sher Hugo MD, Cardiology, [facility]    3. Hyperlipidemia with target LDL less than 70  Improved  Continue Lipitor 40 mg daily    4. Cellulitis of right lower extremity  Worsening  - doxycycline hyclate (VIBRA-TABS) 100 MG tablet; Take 1 tablet by mouth 2 times daily for 10 days Take with food.  For cellulitis  Dispense: 20 tablet; Refill: 0  - mupirocin (BACTROBAN) 2 % ointment; Apply topically 2 times daily on the affected area for 7-10 days. OK to substitute to cream  Dispense: 30 g; Refill: 2  Advised to start wearing the compression stockings every day, she promises me she will do that    5. Type 2 diabetes mellitus with stage 3 chronic kidney disease, without long-term current use of insulin, unspecified whether stage 3a or 3b CKD ([facility]  Likely improving based on her home blood glucose readings  She is not yet due for hemoglobin A1c    - metFORMIN (GLUCOPHAGE) 500 MG tablet; Take 1 tablet by mouth 2 times daily (with meals)  Dispense: 180 tablet; Refill: 3  - Magnesium; Future  - Comprehensive Metabolic Panel; Future  - Hemoglobin A1C; Future  -advised home blood glucose testing  daily  -daily feet exam, Foot care: advised to wash feet daily, pat dry and apply lotion at night, avoiding between toes. Need to look at feet daily and report to a physician any signs of inflammation or skin damage  -annual dilated eye exam  -Low carb, low fat diet, increase fruits and vegetables, and exercise 4-5 times a day 30-40 minutes a day discussed  -continue current treatment  -Patient is not on aspirin, she is on blood thinners apixaban and Plavix instead   -continue high dosage statin high intensity       6. Paroxysmal atrial fibrillation (HCC)  Stable  Metoprolol for rate control, and apixaban for chronic anticoagulation  - Magnesium; Future  - Comprehensive Metabolic Panel; Future  Follow-up with cardiologist for clearance, she says she cannot see the cardiologist since she was seen in the hospital in July  - TREE - New Anderson MD, Cardiology, [place]    7. Preoperative clearance  - TREE - [place] Anderson MD, Cardiology, [place]    8. Influenza vaccination declined  I have recommended that this patient have a flu shot but she declines at this time. I have discussed the risks and benefits of this procedure with her.  The patient verbalizes understanding. 9. Pneumococcal vaccination declined  I have recommended that this patient have a immunization for pneumonia vaccine, but she declines at this time. I have discussed the risks and benefits of this procedure with her. The patient verbalizes understanding. Labs 10/27/2020      Orders Placed This Encounter   Procedures    Magnesium     Standing Status:   Future     Standing Expiration Date:   12/1/2020    Comprehensive Metabolic Panel     Standing Status:   Future     Standing Expiration Date:   12/1/2020    Hemoglobin A1C     Standing Status:   Future     Standing Expiration Date:   12/1/2020   Glenys Workman MD, Cardiology, [place]     Referral Priority:   Routine     Referral Type:   Eval and Treat     Referral Reason:   Specialty Services Required     Referred to Provider:   Maribel Georges MD     Requested Specialty:   Cardiology     Number of Visits Requested:   1         Medications Discontinued During This Encounter   Medication Reason    furosemide (LASIX) 40 MG tablet REORDER    desloratadine (CLARINEX) 5 MG tablet Felizardo Smart received counseling on the following healthy behaviors: nutrition, exercise, medication adherence and weight loss   Reviewed prior labs and health maintenance  Continue current medications, diet and exercise. Discussed use, benefit, and side effects of prescribed medications. Barriers to medication compliance addressed. Patient given educational materials - see patient instructions  Was a self-tracking handout given in paper form or via [employer]?  Yes    Requested Prescriptions     Signed Prescriptions Disp Refills    furosemide (LASIX) 40 MG tablet 180 tablet 3     Sig: TAKE ONE TABLET BY MOUTH TWICE A DAY    desloratadine (CLARINEX) 5 MG tablet 90 tablet 3     Sig: Take 1 tablet by mouth daily    metFORMIN (GLUCOPHAGE) 500 MG tablet 180 tablet 3     Sig: Take 1 tablet by mouth 2 times daily (with meals)    doxycycline hyclate (VIBRA-TABS) 100 MG tablet 20 tablet 0     Sig: Take 1 tablet by mouth 2 times daily for 10 days Take with food. For cellulitis    mupirocin (BACTROBAN) 2 % ointment 30 g 2     Sig: Apply topically 2 times daily on the affected area for 7-10 days. OK to substitute to cream       All patient questions answered. Patient voiced understanding. Quality Measures    Body mass index is 28.28 kg/m². Elevated. Weight control planned discussed conventional weight loss and Healthy diet and regular exercise. BP: 100/60 Blood pressure is normal. Treatment plan consists of Weight Reduction, DASH Eating Plan, Dietary Sodium Restriction, Patient In-home Blood Pressure Monitoring and No treatment change needed. The patient's past medical, surgical, social, and family history as well as her   current medications and allergies were reviewed as documented in today's encounter. Medications, labs, diagnostic studies, consultations and follow-up as documented in this encounter. Return in about 3 months (around 1/1/2021) for ALWAYS NEEDS 30 MIN. APPT, DM2, HTN, HLP, LABS F/U. Patient was seen with total face to face time of 40 minutes due to complexity of care and multiple comorbidities and care coordination with her daughter. More than 50% of this visit was counseling and education. Future Appointments   Date Time Provider Angela Reyez   11/10/2020  3:00 PM Olvin Otto, [address] [place] [address], [facility]   1/22/2021  3:30 PM Vidal Velazquez MD [id number]        This note was completed by using the assistance of a speech-recognition program. However, inadvertent computerized transcription errors may be present. Although every effort was made to ensure accuracy, no guarantees can be provided that every mistake has been identified and corrected by editing.     Electronically signed by Vidal Velazquez MD on 10/4/2020 at 4:21 PM

## 2020-10-01 NOTE — PATIENT INSTRUCTIONS
Patient Education        Learning About Diabetes Food Guidelines  Your Care Instructions     Meal planning is important to manage diabetes. It helps keep your blood sugar at a target level (which you set with your doctor). You don't have to eat special foods. You can eat what your family eats, including sweets once in a while. But you do have to pay attention to how often you eat and how much you eat of certain foods. You may want to work with a dietitian or a certified diabetes educator (CDE) to help you plan meals and snacks. A dietitian or CDE can also help you lose weight if that is one of your goals. What should you know about eating carbs? Managing the amount of carbohydrate (carbs) you eat is an important part of healthy meals when you have diabetes. Carbohydrate is found in many foods. · Learn which foods have carbs. And learn the amounts of carbs in different foods. ? Bread, cereal, pasta, and rice have about 15 grams of carbs in a serving. A serving is 1 slice of bread (1 ounce), ½ cup of cooked cereal, or 1/3 cup of cooked pasta or rice. ? Fruits have 15 grams of carbs in a serving. A serving is 1 small fresh fruit, such as an apple or orange; ½ of a banana; ½ cup of cooked or canned fruit; ½ cup of fruit juice; 1 cup of melon or raspberries; or 2 tablespoons of dried fruit. ? Milk and no-sugar-added yogurt have 15 grams of carbs in a serving. A serving is 1 cup of milk or 2/3 cup of no-sugar-added yogurt. ? Starchy vegetables have 15 grams of carbs in a serving. A serving is ½ cup of mashed potatoes or sweet potato; 1 cup winter squash; ½ of a small baked potato; ½ cup of cooked beans; or ½ cup cooked corn or green peas. · Learn how much carbs to eat each day and at each meal. A dietitian or CDE can teach you how to keep track of the amount of carbs you eat. This is called carbohydrate counting. · If you are not sure how to count carbohydrate grams, use the Plate Method to plan meals.  It is a good, quick way to make sure that you have a balanced meal. It also helps you spread carbs throughout the day. ? Divide your plate by types of foods. Put non-starchy vegetables on half the plate, meat or other protein food on one-quarter of the plate, and a grain or starchy vegetable in the final quarter of the plate. To this you can add a small piece of fruit and 1 cup of milk or yogurt, depending on how many carbs you are supposed to eat at a meal.  · Try to eat about the same amount of carbs at each meal. Do not \"save up\" your daily allowance of carbs to eat at one meal.  · Proteins have very little or no carbs per serving. Examples of proteins are beef, chicken, turkey, fish, eggs, tofu, cheese, cottage cheese, and peanut butter. A serving size of meat is 3 ounces, which is about the size of a deck of cards. Examples of meat substitute serving sizes (equal to 1 ounce of meat) are 1/4 cup of cottage cheese, 1 egg, 1 tablespoon of peanut butter, and ½ cup of tofu. How can you eat out and still eat healthy? · Learn to estimate the serving sizes of foods that have carbohydrate. If you measure food at home, it will be easier to estimate the amount in a serving of restaurant food. · If the meal you order has too much carbohydrate (such as potatoes, corn, or baked beans), ask to have a low-carbohydrate food instead. Ask for a salad or green vegetables. · If you use insulin, check your blood sugar before and after eating out to help you plan how much to eat in the future. · If you eat more carbohydrate at a meal than you had planned, take a walk or do other exercise. This will help lower your blood sugar. What else should you know? · Limit saturated fat, such as the fat from meat and dairy products. This is a healthy choice because people who have diabetes are at higher risk of heart disease. So choose lean cuts of meat and nonfat or low-fat dairy products.  Use olive or canola oil instead of butter or shortening when cooking. · Don't skip meals. Your blood sugar may drop too low if you skip meals and take insulin or certain medicines for diabetes. · Check with your doctor before you drink alcohol. Alcohol can cause your blood sugar to drop too low. Alcohol can also cause a bad reaction if you take certain diabetes medicines. Follow-up care is a key part of your treatment and safety. Be sure to make and go to all appointments, and call your doctor if you are having problems. It's also a good idea to know your test results and keep a list of the medicines you take. Where can you learn more? Go to https://Carmapepiceweb.[employer]. org and sign in to your Snaptracs account. Enter O340 in the "[employer] ([employer])" box to learn more about \"Learning About Diabetes Food Guidelines. \"     If you do not have an account, please click on the \"Sign Up Now\" link. Current as of: December 20, 2019               Content Version: 12.5  © 4072-9501 [employer]. Care instructions adapted under license by [facility] ([facility]). If you have questions about a medical condition or this instruction, always ask your healthcare professional. [place] [address] any warranty or liability for your use of this information. Patient Education        Diabetes Blood Sugar Emergencies: Your Action Plan  How can you prevent a blood sugar emergency? An important part of living with diabetes is keeping your blood sugar in your target range. You'll need to know what to do if it's too high or too low. Managing your blood sugar levels helps you avoid emergencies. This care sheet will teach you about the signs of high and low blood sugar. It will help you make an action plan with your doctor for when these signs occur. Low blood sugar is more likely to happen if you take certain medicines for diabetes.  It can also happen if you skip a meal, drink alcohol, or exercise more than usual.  You may get high blood sugar if you eat differently than you normally do. One example is eating more carbohydrate than usual. Having a cold, the flu, or other sudden illness can also cause high blood sugar levels. Levels can also rise if you miss a dose of medicine. Any change in how you take your medicine may affect your blood sugar level. So it's important to work with your doctor before you make any changes. Check your blood sugar  Work with your doctor to fill in the blank spaces below that apply to you. Track your levels, know your target range, and write down ways you can get your blood sugar back in your target range. A log book can help you track your levels. Take the book to all of your medical appointments. · Check your blood sugar _____ times a day, at these times:________________________________________________. (For example: Before meals, at bedtime, before exercise, during exercise, other.)  · Your blood sugar target range before a meal is ___________________. Your blood sugar target range after a meal is _______________________. · Do this--___________________________________________________--to get your blood sugar back within your safe range if your blood sugar results are _________________________________________. (For example: Less than 70 or above 250 mg/dL.)  Call your doctor when your blood sugar results are ___________________________________. (For example: Less than 70 or above 250 mg/dL.)  What are the symptoms of low and high blood sugar? Common symptoms of low blood sugar are sweating and feeling shaky, weak, hungry, or confused. Symptoms can start quickly. Common symptoms of high blood sugar are feeling very thirsty or very hungry. You may also pass urine more often than usual. You may have blurry vision and may lose weight without trying. But some people may have high or low blood sugar without having any symptoms. That's a good reason to check your blood sugar on a regular schedule.   What should you do if you have symptoms? Work with your doctor to fill in the blank spaces below that apply to you. Low blood sugar  If you have symptoms of low blood sugar, check your blood sugar. If it's below _____ ( for example, below 70), eat or drink a quick-sugar food that has about 15 grams of carbohydrate. Your goal is to get your level back to your safe range. Check your blood sugar again 15 minutes later. If it's still not in your target range, take another 15 grams of carbohydrate and check your blood sugar again in 15 minutes. Repeat this until you reach your target. Then go back to your regular testing schedule. Children usually need less than 15 grams of carbohydrate. Check with your doctor or diabetes educator for the amount that is right for your child. When you have low blood sugar, it's best to stop or reduce any physical activity until your blood sugar is back in your target range and is stable. If you must stay active, eat or drink 30 grams of carbohydrate. Then check your blood sugar again in 15 minutes. If it's not in your target range, take another 30 grams of carbohydrates. Check your blood sugar again in 15 minutes. Keep doing this until you reach your target. You can then go back to your regular testing schedule. If your symptoms or blood sugar levels are getting worse or have not improved after 15 minutes, seek medical care right away. Here are some examples of quick-sugar foods with 15 grams of carbohydrate:  · 3 or 4 glucose tablets  · 1 tablespoon (3 teaspoons) table sugar  · ½ cup to ¾ cup (4 to 6 ounces) of fruit juice or regular (not diet) soda  · Hard candy (such as 6 Life Savers)  High blood sugar  If you have symptoms of high blood sugar, check your blood sugar. Your goal is to get your level back to your target range. If it's above ______ ( for example, above 250), follow these steps:  · If you missed a dose of your diabetes medicine, take it now.  Take only the amount of medicine that you have been prescribed. Do not take more or less medicine. · Give yourself insulin if your doctor has prescribed it for high blood sugar. · Test for ketones, if the doctor told you to do so. If the results of the ketone test show a moderate-to-large amount of ketones, call the doctor for advice. · Wait 30 minutes after you take the extra insulin or the missed medicine. Check your blood sugar again. If your symptoms or blood sugar levels are getting worse or have not improved after taking these steps, seek medical care right away. Follow-up care is a key part of your treatment and safety. Be sure to make and go to all appointments, and call your doctor if you are having problems. It's also a good idea to know your test results and keep a list of the medicines you take. Where can you learn more? Go to https://[facility] Department[facility].[employer]. org and sign in to your GroundCntrl account. Enter H622 in the [employer] box to learn more about \"Diabetes Blood Sugar Emergencies: Your Action Plan. \"     If you do not have an account, please click on the \"Sign Up Now\" link. Current as of: December 20, 2019               Content Version: 12.5  © 5061-6376 Healthwise, Incorporated. Care instructions adapted under license by [facility] ([facility]). If you have questions about a medical condition or this instruction, always ask your healthcare professional. [address] any warranty or liability for your use of this information.

## 2020-10-04 PROBLEM — I48.0 PAROXYSMAL ATRIAL FIBRILLATION (HCC): Status: ACTIVE | Noted: 2020-07-28

## 2020-10-04 PROBLEM — Z28.21 INFLUENZA VACCINATION DECLINED: Status: ACTIVE | Noted: 2020-10-04

## 2020-10-04 PROBLEM — I10 ESSENTIAL HYPERTENSION: Chronic | Status: RESOLVED | Noted: 2020-07-25 | Resolved: 2020-10-04

## 2020-10-04 PROBLEM — R29.90 STROKE-LIKE EPISODE: Status: RESOLVED | Noted: 2020-07-26 | Resolved: 2020-10-04

## 2020-10-04 PROBLEM — Z01.818 PREOPERATIVE CLEARANCE: Status: ACTIVE | Noted: 2020-10-04

## 2020-10-04 PROBLEM — E87.6 HYPOKALEMIA: Status: RESOLVED | Noted: 2020-09-09 | Resolved: 2020-10-04

## 2020-10-04 PROBLEM — G92.8 TOXIC METABOLIC ENCEPHALOPATHY: Status: RESOLVED | Noted: 2020-07-26 | Resolved: 2020-10-04

## 2020-10-04 PROBLEM — L03.116 CELLULITIS OF LEFT LOWER EXTREMITY: Status: RESOLVED | Noted: 2020-07-26 | Resolved: 2020-10-04

## 2020-10-04 PROBLEM — I63.9 ACUTE CVA (CEREBROVASCULAR ACCIDENT) (HCC): Status: RESOLVED | Noted: 2020-07-25 | Resolved: 2020-10-04

## 2020-10-04 PROBLEM — L03.115 CELLULITIS OF RIGHT LOWER EXTREMITY: Status: ACTIVE | Noted: 2020-10-04

## 2020-10-04 ASSESSMENT — ENCOUNTER SYMPTOMS: BLOOD IN STOOL: 0

## 2020-10-06 RX ORDER — CLOPIDOGREL BISULFATE 75 MG/1
75 TABLET ORAL DAILY
Qty: 90 TABLET | Refills: 3 | Status: SHIPPED | OUTPATIENT
Start: 2020-10-06 | End: 2021-03-18 | Stop reason: SDUPTHER

## 2020-10-20 ENCOUNTER — HOSPITAL ENCOUNTER (OUTPATIENT)
Dept: ULTRASOUND IMAGING | Age: 70
Discharge: HOME | End: 2020-10-22
Payer: MEDICARE

## 2020-10-20 PROCEDURE — 76770 US EXAM ABDO BACK WALL COMP: CPT

## 2020-10-27 ENCOUNTER — HOSPITAL ENCOUNTER (OUTPATIENT)
Age: 70
Discharge: HOME | End: 2020-10-27
Payer: MEDICARE

## 2020-10-27 LAB
-: ABNORMAL
ABSOLUTE EOS #: 0.2 K/UL (ref 0–0.4)
ABSOLUTE IMMATURE GRANULOCYTE: ABNORMAL K/UL (ref 0–0.3)
ABSOLUTE LYMPH #: 1.5 K/UL (ref 1–4.8)
ABSOLUTE MONO #: 0.4 K/UL (ref 0.1–1.3)
ALBUMIN SERPL-MCNC: 3.8 G/DL (ref 3.5–5.2)
ALBUMIN/GLOBULIN RATIO: ABNORMAL (ref 1–2.5)
ALP BLD-CCNC: 109 U/L (ref 35–104)
ALT SERPL-CCNC: 23 U/L (ref 5–33)
AMORPHOUS: ABNORMAL
ANION GAP SERPL CALCULATED.3IONS-SCNC: 13 MMOL/L (ref 9–17)
ANION GAP SERPL CALCULATED.3IONS-SCNC: 14 MMOL/L (ref 9–17)
AST SERPL-CCNC: 35 U/L
BACTERIA: ABNORMAL
BASOPHILS # BLD: 1 % (ref 0–2)
BASOPHILS ABSOLUTE: 0.1 K/UL (ref 0–0.2)
BILIRUB SERPL-MCNC: 0.37 MG/DL (ref 0.3–1.2)
BILIRUBIN URINE: NEGATIVE
BUN BLDV-MCNC: 39 MG/DL (ref 8–23)
BUN BLDV-MCNC: 39 MG/DL (ref 8–23)
BUN/CREAT BLD: ABNORMAL (ref 9–20)
BUN/CREAT BLD: ABNORMAL (ref 9–20)
CALCIUM SERPL-MCNC: 9.7 MG/DL (ref 8.6–10.4)
CALCIUM SERPL-MCNC: 9.8 MG/DL (ref 8.6–10.4)
CASTS UA: ABNORMAL /LPF
CHLORIDE BLD-SCNC: 103 MMOL/L (ref 98–107)
CHLORIDE BLD-SCNC: 104 MMOL/L (ref 98–107)
CO2: 26 MMOL/L (ref 20–31)
CO2: 26 MMOL/L (ref 20–31)
COLOR: YELLOW
COMMENT UA: ABNORMAL
COMPLEMENT C3: 147 MG/DL (ref 90–180)
COMPLEMENT C4: 29 MG/DL (ref 10–40)
CREAT SERPL-MCNC: 0.78 MG/DL (ref 0.5–0.9)
CREAT SERPL-MCNC: 0.8 MG/DL (ref 0.5–0.9)
CREATININE URINE: 24.5 MG/DL (ref 28–217)
CRYSTALS, UA: ABNORMAL /HPF
DIFFERENTIAL TYPE: ABNORMAL
EOSINOPHIL,URINE: NORMAL
EOSINOPHILS RELATIVE PERCENT: 3 % (ref 0–4)
EPITHELIAL CELLS UA: ABNORMAL /HPF
FREE KAPPA/LAMBDA RATIO: 0.98 (ref 0.26–1.65)
GFR AFRICAN AMERICAN: >60 ML/MIN
GFR AFRICAN AMERICAN: >60 ML/MIN
GFR NON-AFRICAN AMERICAN: >60 ML/MIN
GFR NON-AFRICAN AMERICAN: >60 ML/MIN
GFR SERPL CREATININE-BSD FRML MDRD: ABNORMAL ML/MIN/{1.73_M2}
GLUCOSE BLD-MCNC: 93 MG/DL (ref 70–99)
GLUCOSE BLD-MCNC: 94 MG/DL (ref 70–99)
GLUCOSE URINE: NEGATIVE
HCT VFR BLD CALC: 44.1 % (ref 36–46)
HEMOGLOBIN: 14.3 G/DL (ref 12–16)
IMMATURE GRANULOCYTES: ABNORMAL %
KAPPA FREE LIGHT CHAINS QNT: 3.02 MG/DL (ref 0.37–1.94)
KETONES, URINE: NEGATIVE
LAMBDA FREE LIGHT CHAINS QNT: 3.08 MG/DL (ref 0.57–2.63)
LEUKOCYTE ESTERASE, URINE: NEGATIVE
LYMPHOCYTES # BLD: 22 % (ref 24–44)
MAGNESIUM: 1.5 MG/DL (ref 1.6–2.6)
MAGNESIUM: 1.5 MG/DL (ref 1.6–2.6)
MCH RBC QN AUTO: 30.7 PG (ref 26–34)
MCHC RBC AUTO-ENTMCNC: 32.5 G/DL (ref 31–37)
MCV RBC AUTO: 94.6 FL (ref 80–100)
MONOCYTES # BLD: 6 % (ref 1–7)
MUCUS: ABNORMAL
NITRITE, URINE: NEGATIVE
NRBC AUTOMATED: ABNORMAL PER 100 WBC
OTHER OBSERVATIONS UA: ABNORMAL
PDW BLD-RTO: 16.2 % (ref 11.5–14.9)
PH UA: 5.5 (ref 5–8)
PHOSPHORUS: 3 MG/DL (ref 2.6–4.5)
PLATELET # BLD: 188 K/UL (ref 150–450)
PLATELET ESTIMATE: ABNORMAL
PMV BLD AUTO: 9.5 FL (ref 6–12)
POTASSIUM SERPL-SCNC: 3.4 MMOL/L (ref 3.7–5.3)
POTASSIUM SERPL-SCNC: 3.4 MMOL/L (ref 3.7–5.3)
PROTEIN UA: ABNORMAL
RBC # BLD: 4.67 M/UL (ref 4–5.2)
RBC # BLD: ABNORMAL 10*6/UL
RBC UA: ABNORMAL /HPF
RENAL EPITHELIAL, UA: ABNORMAL /HPF
SEG NEUTROPHILS: 68 % (ref 36–66)
SEGMENTED NEUTROPHILS ABSOLUTE COUNT: 4.7 K/UL (ref 1.3–9.1)
SODIUM BLD-SCNC: 143 MMOL/L (ref 135–144)
SODIUM BLD-SCNC: 143 MMOL/L (ref 135–144)
SPECIFIC GRAVITY UA: 1.01 (ref 1–1.03)
TOTAL PROTEIN, URINE: 252 MG/DL
TOTAL PROTEIN: 6.9 G/DL (ref 6.4–8.3)
TRICHOMONAS: ABNORMAL
TURBIDITY: CLEAR
URINE HGB: ABNORMAL
URINE TOTAL PROTEIN CREATININE RATIO: 10.29 (ref 0–0.2)
UROBILINOGEN, URINE: NORMAL
WBC # BLD: 6.9 K/UL (ref 3.5–11)
WBC # BLD: ABNORMAL 10*3/UL
WBC UA: ABNORMAL /HPF
YEAST: ABNORMAL

## 2020-10-27 PROCEDURE — 87205 SMEAR GRAM STAIN: CPT

## 2020-10-27 PROCEDURE — 84165 PROTEIN E-PHORESIS SERUM: CPT

## 2020-10-27 PROCEDURE — 86160 COMPLEMENT ANTIGEN: CPT

## 2020-10-27 PROCEDURE — 83735 ASSAY OF MAGNESIUM: CPT

## 2020-10-27 PROCEDURE — 86038 ANTINUCLEAR ANTIBODIES: CPT

## 2020-10-27 PROCEDURE — 81001 URINALYSIS AUTO W/SCOPE: CPT

## 2020-10-27 PROCEDURE — 36415 COLL VENOUS BLD VENIPUNCTURE: CPT

## 2020-10-27 PROCEDURE — 82570 ASSAY OF URINE CREATININE: CPT

## 2020-10-27 PROCEDURE — 83883 ASSAY NEPHELOMETRY NOT SPEC: CPT

## 2020-10-27 PROCEDURE — 84166 PROTEIN E-PHORESIS/URINE/CSF: CPT

## 2020-10-27 PROCEDURE — 85025 COMPLETE CBC W/AUTO DIFF WBC: CPT

## 2020-10-27 PROCEDURE — 83516 IMMUNOASSAY NONANTIBODY: CPT

## 2020-10-27 PROCEDURE — 84155 ASSAY OF PROTEIN SERUM: CPT

## 2020-10-27 PROCEDURE — 83036 HEMOGLOBIN GLYCOSYLATED A1C: CPT

## 2020-10-27 PROCEDURE — 80048 BASIC METABOLIC PNL TOTAL CA: CPT

## 2020-10-27 PROCEDURE — 84156 ASSAY OF PROTEIN URINE: CPT

## 2020-10-27 PROCEDURE — 84100 ASSAY OF PHOSPHORUS: CPT

## 2020-10-27 PROCEDURE — 80053 COMPREHEN METABOLIC PANEL: CPT

## 2020-10-28 LAB
ANTI-NUCLEAR ANTIBODY (ANA): NEGATIVE
ESTIMATED AVERAGE GLUCOSE: 143 MG/DL
HBA1C MFR BLD: 6.6 % (ref 4–6)

## 2020-10-28 RX ORDER — LANOLIN ALCOHOL/MO/W.PET/CERES
400 CREAM (GRAM) TOPICAL DAILY
Qty: 30 TABLET | Refills: 0 | Status: SHIPPED | OUTPATIENT
Start: 2020-10-28 | End: 2021-02-18 | Stop reason: SDUPTHER

## 2020-10-28 RX ORDER — POTASSIUM CHLORIDE 750 MG/1
20 TABLET, FILM COATED, EXTENDED RELEASE ORAL DAILY
Qty: 180 TABLET | Refills: 3 | Status: ON HOLD | OUTPATIENT
Start: 2020-10-28 | End: 2021-02-20 | Stop reason: HOSPADM

## 2020-10-28 RX ORDER — POTASSIUM CHLORIDE 750 MG/1
20 TABLET, FILM COATED, EXTENDED RELEASE ORAL DAILY
Qty: 60 TABLET | Refills: 0 | Status: SHIPPED | OUTPATIENT
Start: 2020-10-28 | End: 2021-02-18 | Stop reason: SDUPTHER

## 2020-10-28 RX ORDER — LANOLIN ALCOHOL/MO/W.PET/CERES
400 CREAM (GRAM) TOPICAL DAILY
Qty: 90 TABLET | Refills: 3 | Status: ON HOLD | OUTPATIENT
Start: 2020-10-28 | End: 2021-02-18 | Stop reason: ALTCHOICE

## 2020-10-28 NOTE — RESULT ENCOUNTER NOTE
ABNORMAL. Please notify patient. A1c 6.6 greatly improved diabetes great job!   Low potassium 3.4  Low magnesium 1.5, same as before  1 liver test is mildly high, if she drinks any alcoholic beverages or pop to stop drinking those, only water should drink  Furosemide needs to be taken with potassium pills, I will send potassium and magnesium supplementation to the pharmacy  Repeat potassium and magnesium levels on Friday morning or Monday morning    Otherwise labs within normal limits      Future Appointments  11/10/2020 3:00 PM    Segun Tobias, [address] [place] [address],     [facility] ED   [facility]  1/22/2021  3:30 PM    Flavio Polo MD     [facility]          [facility]

## 2020-10-29 LAB
ALBUMIN (CALCULATED): 3.6 G/DL (ref 3.2–5.2)
ALBUMIN PERCENT: 59 % (ref 45–65)
ALPHA 1 PERCENT: 4 % (ref 3–6)
ALPHA 2 PERCENT: 15 % (ref 6–13)
ALPHA-1-GLOBULIN: 0.2 G/DL (ref 0.1–0.4)
ALPHA-2-GLOBULIN: 0.9 G/DL (ref 0.5–0.9)
BETA GLOBULIN: 0.8 G/DL (ref 0.5–1.1)
BETA PERCENT: 13 % (ref 11–19)
GAMMA GLOBULIN %: 10 % (ref 9–20)
GAMMA GLOBULIN: 0.6 G/DL (ref 0.5–1.5)
P E INTERPRETATION, U: NORMAL
PATHOLOGIST: ABNORMAL
PATHOLOGIST: NORMAL
PROTEIN ELECTROPHORESIS, SERUM: ABNORMAL
SPECIMEN TYPE: NORMAL
TOTAL PROT. SUM,%: 101 % (ref 98–102)
TOTAL PROT. SUM: 6.1 G/DL (ref 6.3–8.2)
TOTAL PROTEIN: 6.1 G/DL (ref 6.4–8.3)
URINE TOTAL PROTEIN: 261 MG/DL

## 2020-10-30 LAB
ANCA MYELOPEROXIDASE: 5 AU/ML
ANCA PROTEINASE 3: 16 AU/ML

## 2020-11-03 ENCOUNTER — HOSPITAL ENCOUNTER (OUTPATIENT)
Age: 70
Discharge: HOME | End: 2020-11-03
Payer: MEDICARE

## 2020-11-03 PROBLEM — Z01.818 PREOPERATIVE CLEARANCE: Status: RESOLVED | Noted: 2020-10-04 | Resolved: 2020-11-03

## 2020-11-03 LAB
MAGNESIUM: 1.6 MG/DL (ref 1.6–2.6)
POTASSIUM SERPL-SCNC: 3.9 MMOL/L (ref 3.7–5.3)

## 2020-11-03 PROCEDURE — 36415 COLL VENOUS BLD VENIPUNCTURE: CPT

## 2020-11-03 PROCEDURE — 84132 ASSAY OF SERUM POTASSIUM: CPT

## 2020-11-03 PROCEDURE — 83735 ASSAY OF MAGNESIUM: CPT

## 2020-11-03 NOTE — LETTER
[address] [place] [address]. [facility], [address]  Phone: [phone]  Fax: [phone]    MD Silvia Sanderson, MD Chau Zaragoza, MD Mary Whitfield, ROCÍO [address] [facility] [id number]    We were unable to reach you by phone. Please call our office at your earliest convenience. This message is regarding: results. Please call between the hours of 8:30am and 4:00pm Monday through Friday if possible. If you have any questions or concerns, please don't hesitate to call. Sincerely,      [facility].

## 2020-11-03 NOTE — RESULT ENCOUNTER NOTE
Please notify patient, improved magnesium 1.6, potassium 3.9 improved   Continue current treatment  I would even suggest to repeat the blood work in a month I will place a new order    Future Appointments  11/10/2020 3:00 PM    Breanna Foster, [address] [place] [address], [address] [place] [address]  1/22/2021  3:30 PM    Annalee Granda MD     [facility]          [facility]

## 2020-12-15 ENCOUNTER — TELEPHONE (OUTPATIENT)
Dept: FAMILY MEDICINE CLINIC | Age: 70
End: 2020-12-15

## 2020-12-15 NOTE — TELEPHONE ENCOUNTER
Patient stated her pharmacy called in refills for her medications and they just need her pcp to  sign off on them. Patient would like a call and can be reached at [phone]. Okay to leave a message.

## 2021-02-17 ENCOUNTER — APPOINTMENT (OUTPATIENT)
Dept: GENERAL RADIOLOGY | Age: 71
DRG: 682 | End: 2021-02-17
Payer: MEDICARE

## 2021-02-17 ENCOUNTER — HOSPITAL ENCOUNTER (INPATIENT)
Age: 71
LOS: 1 days | Discharge: HOME | DRG: 682 | End: 2021-02-20
Attending: EMERGENCY MEDICINE | Admitting: INTERNAL MEDICINE
Payer: MEDICARE

## 2021-02-17 ENCOUNTER — APPOINTMENT (OUTPATIENT)
Dept: CT IMAGING | Age: 71
DRG: 682 | End: 2021-02-17
Payer: MEDICARE

## 2021-02-17 DIAGNOSIS — D32.0 MENINGIOMA, CEREBRAL (HCC): ICD-10-CM

## 2021-02-17 DIAGNOSIS — N30.01 ACUTE CYSTITIS WITH HEMATURIA: Primary | ICD-10-CM

## 2021-02-17 DIAGNOSIS — N18.30 BENIGN HYPERTENSION WITH CKD (CHRONIC KIDNEY DISEASE) STAGE III (HCC): ICD-10-CM

## 2021-02-17 DIAGNOSIS — I12.9 BENIGN HYPERTENSION WITH CKD (CHRONIC KIDNEY DISEASE) STAGE III (HCC): ICD-10-CM

## 2021-02-17 DIAGNOSIS — R44.1 VISUAL HALLUCINATION: ICD-10-CM

## 2021-02-17 DIAGNOSIS — N18.31 STAGE 3A CHRONIC KIDNEY DISEASE (HCC): ICD-10-CM

## 2021-02-17 DIAGNOSIS — L03.119 CELLULITIS OF LOWER EXTREMITY, UNSPECIFIED LATERALITY: ICD-10-CM

## 2021-02-17 LAB
ABSOLUTE EOS #: 0.2 K/UL (ref 0–0.4)
ABSOLUTE IMMATURE GRANULOCYTE: ABNORMAL K/UL (ref 0–0.3)
ABSOLUTE LYMPH #: 1.5 K/UL (ref 1–4.8)
ABSOLUTE MONO #: 0.6 K/UL (ref 0.1–1.3)
ANION GAP SERPL CALCULATED.3IONS-SCNC: 12 MMOL/L (ref 9–17)
BASOPHILS # BLD: 1 % (ref 0–2)
BASOPHILS ABSOLUTE: 0 K/UL (ref 0–0.2)
BUN BLDV-MCNC: 47 MG/DL (ref 8–23)
BUN/CREAT BLD: ABNORMAL (ref 9–20)
CALCIUM SERPL-MCNC: 10.1 MG/DL (ref 8.6–10.4)
CHLORIDE BLD-SCNC: 104 MMOL/L (ref 98–107)
CO2: 24 MMOL/L (ref 20–31)
CREAT SERPL-MCNC: 0.79 MG/DL (ref 0.5–0.9)
DIFFERENTIAL TYPE: ABNORMAL
EOSINOPHILS RELATIVE PERCENT: 3 % (ref 0–4)
GFR AFRICAN AMERICAN: >60 ML/MIN
GFR NON-AFRICAN AMERICAN: >60 ML/MIN
GFR SERPL CREATININE-BSD FRML MDRD: ABNORMAL ML/MIN/{1.73_M2}
GFR SERPL CREATININE-BSD FRML MDRD: ABNORMAL ML/MIN/{1.73_M2}
GLUCOSE BLD-MCNC: 143 MG/DL (ref 70–99)
HCT VFR BLD CALC: 41.3 % (ref 36–46)
HEMOGLOBIN: 13.2 G/DL (ref 12–16)
IMMATURE GRANULOCYTES: ABNORMAL %
LYMPHOCYTES # BLD: 17 % (ref 24–44)
MCH RBC QN AUTO: 30.1 PG (ref 26–34)
MCHC RBC AUTO-ENTMCNC: 32.1 G/DL (ref 31–37)
MCV RBC AUTO: 93.7 FL (ref 80–100)
MONOCYTES # BLD: 7 % (ref 1–7)
NRBC AUTOMATED: ABNORMAL PER 100 WBC
PDW BLD-RTO: 16.2 % (ref 11.5–14.9)
PLATELET # BLD: 175 K/UL (ref 150–450)
PLATELET ESTIMATE: ABNORMAL
PMV BLD AUTO: 9.3 FL (ref 6–12)
POTASSIUM SERPL-SCNC: 4 MMOL/L (ref 3.7–5.3)
RBC # BLD: 4.41 M/UL (ref 4–5.2)
RBC # BLD: ABNORMAL 10*6/UL
SEG NEUTROPHILS: 72 % (ref 36–66)
SEGMENTED NEUTROPHILS ABSOLUTE COUNT: 6.4 K/UL (ref 1.3–9.1)
SODIUM BLD-SCNC: 140 MMOL/L (ref 135–144)
TROPONIN INTERP: ABNORMAL
TROPONIN T: ABNORMAL NG/ML
TROPONIN, HIGH SENSITIVITY: 24 NG/L (ref 0–14)
WBC # BLD: 8.8 K/UL (ref 3.5–11)
WBC # BLD: ABNORMAL 10*3/UL

## 2021-02-17 PROCEDURE — 87086 URINE CULTURE/COLONY COUNT: CPT

## 2021-02-17 PROCEDURE — 85610 PROTHROMBIN TIME: CPT

## 2021-02-17 PROCEDURE — 81001 URINALYSIS AUTO W/SCOPE: CPT

## 2021-02-17 PROCEDURE — 80048 BASIC METABOLIC PNL TOTAL CA: CPT

## 2021-02-17 PROCEDURE — 71046 X-RAY EXAM CHEST 2 VIEWS: CPT

## 2021-02-17 PROCEDURE — 85025 COMPLETE CBC W/AUTO DIFF WBC: CPT

## 2021-02-17 PROCEDURE — 84484 ASSAY OF TROPONIN QUANT: CPT

## 2021-02-17 PROCEDURE — 70450 CT HEAD/BRAIN W/O DYE: CPT

## 2021-02-17 PROCEDURE — 36415 COLL VENOUS BLD VENIPUNCTURE: CPT

## 2021-02-17 PROCEDURE — 99284 EMERGENCY DEPT VISIT MOD MDM: CPT

## 2021-02-17 ASSESSMENT — ENCOUNTER SYMPTOMS
RHINORRHEA: 0
DIARRHEA: 1
SHORTNESS OF BREATH: 0
TROUBLE SWALLOWING: 0
SINUS PAIN: 0
VOMITING: 0
COLOR CHANGE: 1
BLOOD IN STOOL: 0
ABDOMINAL PAIN: 0
SINUS PRESSURE: 0
COUGH: 0
NAUSEA: 0
SORE THROAT: 0
VOICE CHANGE: 0

## 2021-02-18 ENCOUNTER — APPOINTMENT (OUTPATIENT)
Dept: MRI IMAGING | Age: 71
DRG: 682 | End: 2021-02-18
Payer: MEDICARE

## 2021-02-18 PROBLEM — L03.119 CELLULITIS OF LOWER EXTREMITY: Status: ACTIVE | Noted: 2020-10-04

## 2021-02-18 PROBLEM — N39.0 UTI (URINARY TRACT INFECTION): Status: ACTIVE | Noted: 2021-02-18

## 2021-02-18 PROBLEM — R44.3 HALLUCINATIONS: Status: ACTIVE | Noted: 2021-02-18

## 2021-02-18 LAB
-: ABNORMAL
AMORPHOUS: ABNORMAL
BACTERIA: ABNORMAL
BILIRUBIN URINE: NEGATIVE
CASTS UA: ABNORMAL /LPF
COLOR: YELLOW
COMMENT UA: ABNORMAL
CRYSTALS, UA: ABNORMAL /HPF
EKG ATRIAL RATE: 99 BPM
EKG P AXIS: 5 DEGREES
EKG P-R INTERVAL: 172 MS
EKG Q-T INTERVAL: 344 MS
EKG QRS DURATION: 82 MS
EKG QTC CALCULATION (BAZETT): 441 MS
EKG R AXIS: 3 DEGREES
EKG T AXIS: 132 DEGREES
EKG VENTRICULAR RATE: 99 BPM
EPITHELIAL CELLS UA: ABNORMAL /HPF
GLUCOSE BLD-MCNC: 112 MG/DL (ref 65–105)
GLUCOSE BLD-MCNC: 128 MG/DL (ref 65–105)
GLUCOSE BLD-MCNC: 133 MG/DL (ref 65–105)
GLUCOSE BLD-MCNC: 158 MG/DL (ref 65–105)
GLUCOSE URINE: NEGATIVE
INR BLD: 1.1
KETONES, URINE: NEGATIVE
LACTIC ACID: 1.2 MMOL/L (ref 0.5–2.2)
LEUKOCYTE ESTERASE, URINE: NEGATIVE
MUCUS: ABNORMAL
NITRITE, URINE: NEGATIVE
OTHER OBSERVATIONS UA: ABNORMAL
PH UA: 5.5 (ref 5–8)
PROTEIN UA: ABNORMAL
PROTHROMBIN TIME: 13.9 SEC (ref 11.8–14.6)
RBC UA: ABNORMAL /HPF
RENAL EPITHELIAL, UA: ABNORMAL /HPF
SPECIFIC GRAVITY UA: 1.02 (ref 1–1.03)
TRICHOMONAS: ABNORMAL
TURBIDITY: ABNORMAL
URINE HGB: ABNORMAL
UROBILINOGEN, URINE: NORMAL
WBC UA: ABNORMAL /HPF
YEAST: ABNORMAL

## 2021-02-18 PROCEDURE — 6360000002 HC RX W HCPCS: Performed by: GENERAL PRACTICE

## 2021-02-18 PROCEDURE — 82947 ASSAY GLUCOSE BLOOD QUANT: CPT

## 2021-02-18 PROCEDURE — 99222 1ST HOSP IP/OBS MODERATE 55: CPT | Performed by: PSYCHIATRY & NEUROLOGY

## 2021-02-18 PROCEDURE — 83605 ASSAY OF LACTIC ACID: CPT

## 2021-02-18 PROCEDURE — 70553 MRI BRAIN STEM W/O & W/DYE: CPT

## 2021-02-18 PROCEDURE — 6360000002 HC RX W HCPCS: Performed by: NURSE PRACTITIONER

## 2021-02-18 PROCEDURE — G0378 HOSPITAL OBSERVATION PER HR: HCPCS

## 2021-02-18 PROCEDURE — 87150 DNA/RNA AMPLIFIED PROBE: CPT

## 2021-02-18 PROCEDURE — 6360000004 HC RX CONTRAST MEDICATION: Performed by: INTERNAL MEDICINE

## 2021-02-18 PROCEDURE — 6370000000 HC RX 637 (ALT 250 FOR IP): Performed by: INTERNAL MEDICINE

## 2021-02-18 PROCEDURE — 87040 BLOOD CULTURE FOR BACTERIA: CPT

## 2021-02-18 PROCEDURE — 2580000003 HC RX 258: Performed by: INTERNAL MEDICINE

## 2021-02-18 PROCEDURE — 36415 COLL VENOUS BLD VENIPUNCTURE: CPT

## 2021-02-18 PROCEDURE — 2580000003 HC RX 258: Performed by: NURSE PRACTITIONER

## 2021-02-18 PROCEDURE — 93005 ELECTROCARDIOGRAM TRACING: CPT | Performed by: GENERAL PRACTICE

## 2021-02-18 PROCEDURE — 2580000003 HC RX 258: Performed by: EMERGENCY MEDICINE

## 2021-02-18 PROCEDURE — 6370000000 HC RX 637 (ALT 250 FOR IP): Performed by: NURSE PRACTITIONER

## 2021-02-18 PROCEDURE — 99223 1ST HOSP IP/OBS HIGH 75: CPT | Performed by: INTERNAL MEDICINE

## 2021-02-18 PROCEDURE — 6360000002 HC RX W HCPCS: Performed by: EMERGENCY MEDICINE

## 2021-02-18 PROCEDURE — 93010 ELECTROCARDIOGRAM REPORT: CPT | Performed by: INTERNAL MEDICINE

## 2021-02-18 PROCEDURE — 97161 PT EVAL LOW COMPLEX 20 MIN: CPT

## 2021-02-18 PROCEDURE — 2500000003 HC RX 250 WO HCPCS: Performed by: NURSE PRACTITIONER

## 2021-02-18 PROCEDURE — A9579 GAD-BASE MR CONTRAST NOS,1ML: HCPCS | Performed by: INTERNAL MEDICINE

## 2021-02-18 PROCEDURE — 87205 SMEAR GRAM STAIN: CPT

## 2021-02-18 PROCEDURE — 2500000003 HC RX 250 WO HCPCS: Performed by: INTERNAL MEDICINE

## 2021-02-18 RX ORDER — FUROSEMIDE 40 MG/1
40 TABLET ORAL 2 TIMES DAILY
Status: DISCONTINUED | OUTPATIENT
Start: 2021-02-18 | End: 2021-02-20 | Stop reason: HOSPADM

## 2021-02-18 RX ORDER — HYDROCHLOROTHIAZIDE 25 MG/1
25 TABLET ORAL DAILY
Status: DISCONTINUED | OUTPATIENT
Start: 2021-02-18 | End: 2021-02-20 | Stop reason: HOSPADM

## 2021-02-18 RX ORDER — M-VIT,TX,IRON,MINS/CALC/FOLIC 27MG-0.4MG
1 TABLET ORAL DAILY
Status: DISCONTINUED | OUTPATIENT
Start: 2021-02-18 | End: 2021-02-20 | Stop reason: HOSPADM

## 2021-02-18 RX ORDER — POTASSIUM CHLORIDE 7.45 MG/ML
10 INJECTION INTRAVENOUS PRN
Status: DISCONTINUED | OUTPATIENT
Start: 2021-02-18 | End: 2021-02-20 | Stop reason: HOSPADM

## 2021-02-18 RX ORDER — SODIUM CHLORIDE 0.9 % (FLUSH) 0.9 %
10 SYRINGE (ML) INJECTION EVERY 12 HOURS SCHEDULED
Status: DISCONTINUED | OUTPATIENT
Start: 2021-02-18 | End: 2021-02-20 | Stop reason: HOSPADM

## 2021-02-18 RX ORDER — SODIUM CHLORIDE 0.9 % (FLUSH) 0.9 %
10 SYRINGE (ML) INJECTION PRN
Status: DISCONTINUED | OUTPATIENT
Start: 2021-02-18 | End: 2021-02-20 | Stop reason: HOSPADM

## 2021-02-18 RX ORDER — POTASSIUM CHLORIDE 20 MEQ/1
40 TABLET, EXTENDED RELEASE ORAL PRN
Status: DISCONTINUED | OUTPATIENT
Start: 2021-02-18 | End: 2021-02-20 | Stop reason: HOSPADM

## 2021-02-18 RX ORDER — METOPROLOL TARTRATE 5 MG/5ML
5 INJECTION INTRAVENOUS ONCE
Status: COMPLETED | OUTPATIENT
Start: 2021-02-18 | End: 2021-02-18

## 2021-02-18 RX ORDER — NICOTINE POLACRILEX 4 MG
15 LOZENGE BUCCAL PRN
Status: DISCONTINUED | OUTPATIENT
Start: 2021-02-18 | End: 2021-02-20 | Stop reason: HOSPADM

## 2021-02-18 RX ORDER — DESLORATADINE 5 MG/1
5 TABLET ORAL DAILY
Status: DISCONTINUED | OUTPATIENT
Start: 2021-02-18 | End: 2021-02-20 | Stop reason: HOSPADM

## 2021-02-18 RX ORDER — ERGOCALCIFEROL 1.25 MG/1
50000 CAPSULE ORAL WEEKLY
Status: DISCONTINUED | OUTPATIENT
Start: 2021-02-18 | End: 2021-02-20 | Stop reason: HOSPADM

## 2021-02-18 RX ORDER — FUROSEMIDE 40 MG/1
40 TABLET ORAL 2 TIMES DAILY
COMMUNITY
End: 2021-02-25 | Stop reason: ALTCHOICE

## 2021-02-18 RX ORDER — ACETAMINOPHEN 325 MG/1
650 TABLET ORAL EVERY 6 HOURS PRN
Status: DISCONTINUED | OUTPATIENT
Start: 2021-02-18 | End: 2021-02-20 | Stop reason: HOSPADM

## 2021-02-18 RX ORDER — POTASSIUM CHLORIDE 20 MEQ/1
20 TABLET, EXTENDED RELEASE ORAL DAILY
Status: DISCONTINUED | OUTPATIENT
Start: 2021-02-18 | End: 2021-02-20 | Stop reason: HOSPADM

## 2021-02-18 RX ORDER — CIPROFLOXACIN 2 MG/ML
400 INJECTION, SOLUTION INTRAVENOUS ONCE
Status: COMPLETED | OUTPATIENT
Start: 2021-02-18 | End: 2021-02-18

## 2021-02-18 RX ORDER — CETIRIZINE HYDROCHLORIDE 10 MG/1
10 TABLET ORAL DAILY
Refills: 3 | Status: DISCONTINUED | OUTPATIENT
Start: 2021-02-18 | End: 2021-02-18

## 2021-02-18 RX ORDER — MAGNESIUM SULFATE 1 G/100ML
1000 INJECTION INTRAVENOUS PRN
Status: DISCONTINUED | OUTPATIENT
Start: 2021-02-18 | End: 2021-02-20 | Stop reason: HOSPADM

## 2021-02-18 RX ORDER — CIPROFLOXACIN 2 MG/ML
400 INJECTION, SOLUTION INTRAVENOUS EVERY 12 HOURS
Status: DISCONTINUED | OUTPATIENT
Start: 2021-02-18 | End: 2021-02-19

## 2021-02-18 RX ORDER — POLYETHYLENE GLYCOL 3350 17 G/17G
17 POWDER, FOR SOLUTION ORAL DAILY PRN
Status: DISCONTINUED | OUTPATIENT
Start: 2021-02-18 | End: 2021-02-20 | Stop reason: HOSPADM

## 2021-02-18 RX ORDER — METOPROLOL TARTRATE 50 MG/1
50 TABLET, FILM COATED ORAL 2 TIMES DAILY
Status: DISCONTINUED | OUTPATIENT
Start: 2021-02-18 | End: 2021-02-20 | Stop reason: HOSPADM

## 2021-02-18 RX ORDER — DEXTROSE MONOHYDRATE 50 MG/ML
100 INJECTION, SOLUTION INTRAVENOUS PRN
Status: DISCONTINUED | OUTPATIENT
Start: 2021-02-18 | End: 2021-02-20 | Stop reason: HOSPADM

## 2021-02-18 RX ORDER — ACETAMINOPHEN 650 MG/1
650 SUPPOSITORY RECTAL EVERY 6 HOURS PRN
Status: DISCONTINUED | OUTPATIENT
Start: 2021-02-18 | End: 2021-02-20 | Stop reason: HOSPADM

## 2021-02-18 RX ORDER — PROMETHAZINE HYDROCHLORIDE 25 MG/1
12.5 TABLET ORAL EVERY 6 HOURS PRN
Status: DISCONTINUED | OUTPATIENT
Start: 2021-02-18 | End: 2021-02-20 | Stop reason: HOSPADM

## 2021-02-18 RX ORDER — DEXTROSE MONOHYDRATE 25 G/50ML
12.5 INJECTION, SOLUTION INTRAVENOUS PRN
Status: DISCONTINUED | OUTPATIENT
Start: 2021-02-18 | End: 2021-02-20 | Stop reason: HOSPADM

## 2021-02-18 RX ORDER — ONDANSETRON 2 MG/ML
4 INJECTION INTRAMUSCULAR; INTRAVENOUS EVERY 6 HOURS PRN
Status: DISCONTINUED | OUTPATIENT
Start: 2021-02-18 | End: 2021-02-20 | Stop reason: HOSPADM

## 2021-02-18 RX ORDER — METOPROLOL TARTRATE 50 MG/1
50 TABLET, FILM COATED ORAL 2 TIMES DAILY
Status: DISCONTINUED | OUTPATIENT
Start: 2021-02-19 | End: 2021-02-18

## 2021-02-18 RX ORDER — CLOPIDOGREL BISULFATE 75 MG/1
75 TABLET ORAL DAILY
Status: DISCONTINUED | OUTPATIENT
Start: 2021-02-18 | End: 2021-02-20 | Stop reason: HOSPADM

## 2021-02-18 RX ADMIN — CLOPIDOGREL BISULFATE 75 MG: 75 TABLET ORAL at 09:31

## 2021-02-18 RX ADMIN — METFORMIN HYDROCHLORIDE 500 MG: 500 TABLET ORAL at 09:45

## 2021-02-18 RX ADMIN — CIPROFLOXACIN 400 MG: 2 INJECTION, SOLUTION INTRAVENOUS at 16:21

## 2021-02-18 RX ADMIN — Medication 10 ML: at 15:55

## 2021-02-18 RX ADMIN — DOXYCYCLINE 100 MG: 100 INJECTION, POWDER, LYOPHILIZED, FOR SOLUTION INTRAVENOUS at 17:51

## 2021-02-18 RX ADMIN — FUROSEMIDE 40 MG: 40 TABLET ORAL at 09:30

## 2021-02-18 RX ADMIN — METOPROLOL TARTRATE 50 MG: 50 TABLET, FILM COATED ORAL at 04:03

## 2021-02-18 RX ADMIN — SODIUM CHLORIDE, PRESERVATIVE FREE 10 ML: 5 INJECTION INTRAVENOUS at 09:46

## 2021-02-18 RX ADMIN — METOPROLOL TARTRATE 50 MG: 50 TABLET, FILM COATED ORAL at 10:00

## 2021-02-18 RX ADMIN — SODIUM CHLORIDE, PRESERVATIVE FREE 10 ML: 5 INJECTION INTRAVENOUS at 19:47

## 2021-02-18 RX ADMIN — METOPROLOL TARTRATE 5 MG: 1 INJECTION, SOLUTION INTRAVENOUS at 02:04

## 2021-02-18 RX ADMIN — FUROSEMIDE 40 MG: 40 TABLET ORAL at 17:51

## 2021-02-18 RX ADMIN — MULTIPLE VITAMINS W/ MINERALS TAB 1 TABLET: TAB at 09:45

## 2021-02-18 RX ADMIN — METOPROLOL TARTRATE 50 MG: 50 TABLET, FILM COATED ORAL at 19:47

## 2021-02-18 RX ADMIN — ACETAMINOPHEN 650 MG: 325 TABLET ORAL at 16:55

## 2021-02-18 RX ADMIN — ERGOCALCIFEROL 50000 UNITS: 1.25 CAPSULE ORAL at 09:30

## 2021-02-18 RX ADMIN — VANCOMYCIN HYDROCHLORIDE 1500 MG: 1.5 INJECTION, POWDER, LYOPHILIZED, FOR SOLUTION INTRAVENOUS at 04:04

## 2021-02-18 RX ADMIN — HYDROCHLOROTHIAZIDE 25 MG: 25 TABLET ORAL at 16:28

## 2021-02-18 RX ADMIN — POTASSIUM CHLORIDE 20 MEQ: 1500 TABLET, EXTENDED RELEASE ORAL at 09:45

## 2021-02-18 RX ADMIN — METFORMIN HYDROCHLORIDE 500 MG: 500 TABLET ORAL at 17:51

## 2021-02-18 RX ADMIN — DESLORATADINE 5 MG: 5 TABLET ORAL at 09:30

## 2021-02-18 RX ADMIN — CIPROFLOXACIN 400 MG: 2 INJECTION, SOLUTION INTRAVENOUS at 01:30

## 2021-02-18 RX ADMIN — GADOTERIDOL 13 ML: 279.3 INJECTION, SOLUTION INTRAVENOUS at 15:51

## 2021-02-18 ASSESSMENT — ENCOUNTER SYMPTOMS
CONSTIPATION: 0
NAUSEA: 0
SORE THROAT: 0
VOMITING: 0
COUGH: 0
SHORTNESS OF BREATH: 0
ABDOMINAL PAIN: 0
DIARRHEA: 0
WHEEZING: 0
BACK PAIN: 0

## 2021-02-18 ASSESSMENT — PAIN SCALES - GENERAL
PAINLEVEL_OUTOF10: 3
PAINLEVEL_OUTOF10: 4

## 2021-02-18 NOTE — ED PROVIDER NOTES
EMERGENCY DEPARTMENT ENCOUNTER   ATTENDING ATTESTATION     Pt Name: Erin Hayes  MRN: [id number]  [place] 1950  Date of evaluation: 2/17/21       Erin Hayes is a 69 y.o. female who presents with Hallucinations and Check-Up      MDM: 58-year-old female presents with daughter for complaint of reported hallucination. Patient reportedly saw a person's start there earlier today, patient was brought for evaluation for concern for possible infection, patient also reportedly has a history of mass in her head from prior CT, will obtain labs and imaging    CT head was negative for acute process, patient noted to have cellulitis of the lower extremities, patient also noted to have bacteria and WBCs in urine, concerning for infection, given reported hallucinations and concern for infection, decision was made to admit patient for further observation and starting on IV antibiotics    Discussed plan with patient and daughter, both are agreeable to admission      Spoke with Ashley Rutherford NP for [facility] who accepts admission. Patient demonstrates understanding and agreement with the plan, was given the opportunity to ask questions, and these questions were answered to the best of the provided information at this time. VS stable for transfer. This dictation was prepared using [employer] [place] [employer] voice recognition software. As a result, errors may have occurred. When identified, these errors have been corrected. While every attempt is made to correct errors in dictation, errors may still exist.         Vitals:   Vitals:    02/17/21 2248   BP: (!) 182/63   Pulse: 100   Resp: 14   Temp: 98 °F (36.7 °C)   TempSrc: Oral   SpO2: 93%   Weight: 140 lb (63.5 kg)   Height: 4' 11\" (1.499 m)         I personally evaluated and examined the patient in conjunction with the resident and agree with the assessment, treatment plan, and disposition of the patient as recorded by the resident.     I performed a history and physical examination of the patient and discussed management with the resident. I reviewed the residents note and agree with the documented findings and plan of care. Any areas of disagreement are noted on the chart. I was personally present for the key portions of any procedures. I have documented in the chart those procedures where I was not present during the key portions. I have personally reviewed all images and agree with the resident's interpretation. I have reviewed the emergency nurses triage note. I agree with the chief complaint, past medical history, past surgical history, allergies, medications, social and family history as documented unless otherwise noted.     Claude Pool DO  Attending Emergency Physician          Claude Pool DO  02/18/21 [id number]

## 2021-02-18 NOTE — CARE COORDINATION
CASE MANAGEMENT NOTE:    Admission Date:  2/17/2021 Kacey Sue is a 69 y.o.  female    Admitted for : UTI (urinary tract infection) [N39.0]    Met with:  Patient and vivian Max at bedside    PCP:  Dr. Saira Zuleta:  Medicare      Current Residence/ Living Arrangements:  independently at home with son             Current Services PTA:  No    Is patient agreeable to VNS: No    Freedom of choice provided:  Yes    List of [address] provided: No    VNS chosen:  No    DME:  walker and shower chair, BP cuff and toilet riser with arms. Home Oxygen: No    Nebulizer: No    CPAP/BIPAP: No    Supplier: N/A    Potential Assistance Needed: Yes - Wants outpatient PT/OT    SNF needed: No    Freedom of choice and list provided: No    Pharmacy:  [facility]       Does Patient want to use MEDS to BEDS? No    Is patient currently receiving oral anticoagulation therapy? Yes - Eliquis PTA    Is the Patient an [facility] with Readmission Risk Score greater than 14%? No  If yes, pt needs a follow up appointment made within 7 days. Family Members/Caregivers that pt would like involved in their care:    Yes    If yes, list name here:  Fernando Singh    Transportation Provider:  Family             Is patient in Isolation/One on One/Altered Mental Status? Yes  If yes, skip next question. If no, would they like an I-Pad to  use? NA  If yes, call [phone]. Discharge Plan:  2/18: MEDICARE - Patient is from 2-story home with son. DME - Walker, SC, BP Cuff, toilet riser with arms. Declines need for VNS - Wants outpatient PT - Orders in place. Eliquis PTA. IV cipro, IV vanco and IV doxy, PT/OT. MRI brain for hallucinations and cerebral meningioma.  //GILES                 Electronically signed by: Nuria Rossi RN on 2/18/2021 at 2:50 PM

## 2021-02-18 NOTE — PROGRESS NOTES
Admitted to room 2084 from ED per Bed. Oriented to room and call light. Vitals and assessment completed, no distress noted. Pt BP/HR high & irregular, [facility] [address] notified, new orders received.

## 2021-02-18 NOTE — PLAN OF CARE
Please fill out the MRI Screening form and fax to dept @ [phone]. Any questions. .please call [facility] MRI @ [phone]. MRI exam will be scheduled after receiving the completed screening form.  Thank you!!

## 2021-02-18 NOTE — H&P
[address] [facility]     HISTORY AND PHYSICAL EXAMINATION            Date:   2/18/2021  Patientname:  Renee Ojeda  Date of admission:  2/17/2021 10:51 PM  MRN:   [id number]  Account:  [de-identified]  YOB: 1950  PCP:    Shaylee Chung MD  Room:   3772/9568-66  Code Status:    Full Code    CHIEF COMPLAINT     Chief Complaint   Patient presents with    Hallucinations    Check-Up       HISTORY OF PRESENT ILLNESS  (Character, Onset, Location, Duration,  Exacerbating/RelievingFactors, Radiation,   Associated Symptoms, Severity )      The patient is a 69 y.o.  female, with a history of CVA cerebral meningioma, chronic diastolic CHF, COPD, HTN, paroxysmal atrial fibrillation-chronically anticoagulated on Eliquis, and DM type II, who presents with hallucinations and for checkup. According to patient, she came to the ED in order to be seen by her PCP and her neurologist, so she could get both appointments taken care of at once. Daughter reports that patient was found to have 2 masses in her brain in July 2020, and today is concerned that she is having hallucinations. Patient reports that earlier today a little yellow clown car pulled into her yard and ran into her plant while trying to drive over the hump, knocking the bumper off the front of their car. Patient provided elaborate details about the age of the people in the car and states that she opened her front door and yelled at them about running into her plant. Daughter is concerned the patient had a hallucination as there was no evidence of tire tracks or footprints in the patient's yard and no damage had been done to patient's shrub. Symptoms are associated with edema and erythema of bilateral lower extremities. Patient reports past episodes of cellulitis in BLE, but denies tenderness that is usually present when she has cellulitis.   Patient reports history of chronic diarrhea, but states that she has not had any recently. Denies fever, chills, chest pain, cough, abdominal pain, nausea, vomiting, diarrhea, and urinary symptoms. There are no aggravating or alleviating factors. Symptoms are reported as intermittent and moderate. PAST MEDICAL HISTORY   Patient  has a past medical history of Acute CVA (cerebrovascular accident) ([facility] [address] Arthritis, Brain mass, Cellulitis and abscess, Cellulitis and abscess of unspecified site, Cellulitis of left lower extremity, CHF (congestive heart failure) ([address] Chronic kidney disease, unspecified, Coronary atherosclerosis of native coronary artery, Essential hypertension, Essential hypertension, malignant, Hard of hearing, Hypokalemia, Iron deficiency anemia, unspecified, Myocardial infarction ([address] Other and unspecified hyperlipidemia, Pure hypercholesterolemia, Toxic metabolic encephalopathy, Type II or unspecified type diabetes mellitus without mention of complication, not stated as uncontrolled, Unspecified asthma(493.90), Unspecified venous (peripheral) insufficiency, and Unspecified vitamin D deficiency. PAST SURGICAL HISTORY    Patient  has a past surgical history that includes Tonsillectomy and adenoidectomy and Coronary artery bypass graft. FAMILY HISTORY    Patient family history includes Diabetes in her maternal grandmother, mother, and sister; Heart Disease in her father and mother; High Blood Pressure in her sister. SOCIAL HISTORY    Patient  reports that she quit smoking about 21 years ago. She has a 2.50 pack-year smoking history. She has never used smokeless tobacco. She reports current alcohol use. She reports that she does not use drugs. HOME MEDICATIONS        Prior to Admission medications    Medication Sig Start Date End Date Taking?  Authorizing Provider   metFORMIN (GLUCOPHAGE) 500 MG tablet Take 1 tablet by mouth 2 times daily (with meals) 10/1/20  Yes Davina Alonso MD   metoprolol tartrate (LOPRESSOR) 50 MG tablet Take 1 tablet by mouth 2 times daily 3/3/20  Yes Grayson Serrano MD   potassium chloride (K-TAB) 10 MEQ extended release tablet Take 2 tablets by mouth daily Take with furosemide. Due to low potassium 10/28/20   Grayson Serrano MD   magnesium oxide (MAG-OX) 400 (240 Mg) MG tablet Take 1 tablet by mouth daily Due to low magnesium 10/28/20   Grayson Serrano MD   clopidogrel (PLAVIX) 75 MG tablet Take 1 tablet by mouth daily 10/6/20   Grayson Serrano MD   furosemide (LASIX) 40 MG tablet TAKE ONE TABLET BY MOUTH TWICE A DAY 10/1/20   Grayson Serrano MD   desloratadine (CLARINEX) 5 MG tablet Take 1 tablet by mouth daily 10/1/20   Grayson Serrano MD   mupirocin (BACTROBAN) 2 % ointment Apply topically 2 times daily on the affected area for 7-10 days.  OK to substitute to cream 10/1/20   Grayson Serrano MD   vitamin D (ERGOCALCIFEROL) 1.25 MG (14319 UT) CAPS capsule Take 1 capsule by mouth once a week 9/28/20   Sarai Rosado MD   apixaban (ELIQUIS) 5 MG TABS tablet Take 1 tablet by mouth 2 times daily 7/31/20   Catherine Lara, APRN - CNP   Multiple Vitamins-Minerals (THERAPEUTIC MULTIVITAMIN-MINERALS) tablet Take 1 tablet by mouth daily    Historical Provider, MD   atorvastatin (LIPITOR) 40 MG tablet Take 1 tablet by mouth daily ** stop simvastatin& fenofibrate ** 2/20/20   Grayson Serrano MD   mineral oil-hydrophilic petrolatum (HYDROPHOR) ointment Apply topically twice a day as needed for dry skin on the hands and forearms  Patient not taking: Reported on 10/1/2020 2/20/20   Grayson Serrano MD   allopurinol (ZYLOPRIM) 300 MG tablet Take 1 tablet by mouth daily Per rheumatologist 2/20/20   Grayson Serrano MD   diclofenac sodium 1 % GEL APPLY 4 GRAMS TOPICALLY THREE TIMES DAILY FOR 30 DAYS 12/19/19   Historical Provider, MD   albuterol sulfate HFA (PROVENTIL HFA) 108 (90 Base) MCG/ACT inhaler Inhale 1-2 puffs into the lungs every 4 hours as needed for Wheezing 1/7/20   Brii Charlton Ayush Quesada MD   Blood Pressure KIT 1 kit by Does not apply route three times daily 12/19/19   Kathy Lux MD   FREESTYLE LANCETS MISC 1 each by Does not apply route daily Patient needs to contact office before any further refills will be approved 12/18/19   Kathy Lux MD   blood glucose test strips (FREESTYLE LITE) strip 1 each by In Vitro route daily As needed. 12/18/19   Kathy Lux MD   clobetasol (TEMOVATE) 0.05 % ointment Apply topically 2 times daily. For 7days  Patient not taking: Reported on 10/1/2020 12/18/19   Kathy Lux MD   blood glucose monitor kit and supplies 1 kit by Other route three times daily Dispense Butterfly Elite CBG Device 8/20/19   Albino Ortez MD   ammonium lactate (AMLACTIN) 12 % cream Apply 2 g topically as needed for Dry Skin. Apply topically as needed. Historical Provider, MD       ALLERGIES      Aleve [naproxen sodium], Pioglitazone, Claritin [loratadine], Keflex [cephalexin], and Lisinopril    REVIEW OF SYSTEMS     Review of Systems   Constitutional: Negative for chills, diaphoresis and fever. HENT: Negative for congestion and sore throat. Respiratory: Negative for cough, shortness of breath and wheezing. Cardiovascular: Negative for chest pain, palpitations and leg swelling. Gastrointestinal: Negative for abdominal pain, constipation, diarrhea, nausea and vomiting. Genitourinary: Negative for dysuria, frequency and urgency. Musculoskeletal: Negative for back pain and myalgias. Skin: Negative for rash. Neurological: Negative for dizziness, weakness and headaches. Psychiatric/Behavioral: Positive for hallucinations. The patient is not nervous/anxious. PHYSICAL EXAM      BP (!) 172/72   Pulse 130   Temp 98.2 °F (36.8 °C) (Oral)   Resp 18   Ht 4' 11\" (1.499 m)   Wt 140 lb (63.5 kg)   SpO2 98%   BMI 28.28 kg/m²  Body mass index is 28.28 kg/m². Physical Exam  Constitutional:       General: She is not in acute distress. Appearance: She is well-developed. She is not diaphoretic. HENT:      Head: Normocephalic and atraumatic. Comments: Small 0.5 cm fading ecchymotic area noted above left eyebrow. Eyes:      Conjunctiva/sclera: Conjunctivae normal.      Pupils: Pupils are equal, round, and reactive to light. Neck:      Musculoskeletal: Normal range of motion and neck supple. Trachea: No tracheal deviation. Cardiovascular:      Rate and Rhythm: Tachycardia present. Rhythm irregular. Heart sounds: Normal heart sounds. No murmur. No friction rub. No gallop. Pulmonary:      Effort: Pulmonary effort is normal. No respiratory distress. Breath sounds: Normal breath sounds. No wheezing or rales. Chest:      Chest wall: No tenderness. Abdominal:      General: Bowel sounds are normal. There is no distension. Palpations: Abdomen is soft. Tenderness: There is no abdominal tenderness. There is no guarding. Musculoskeletal: Normal range of motion. General: No tenderness. Right lower leg: Edema present. Left lower leg: Edema present. Comments: 1+ edema of BLE    Lymphadenopathy:      Cervical: No cervical adenopathy. Skin:     General: Skin is warm and dry. Coloration: Skin is not pale. Findings: Erythema present. No rash. Comments: Chronic erythema of bilateral lower extremities. Scratch marks noted. Patient denies pain in area and states that they look about as usual. Daughter feels they appear more red than usual.  Right ankle noted to have increased warmth. Neurological:      Mental Status: She is alert and oriented to person, place, and time. Motor: No seizure activity. Coordination: Coordination normal.   Psychiatric:         Behavior: Behavior normal.         Thought Content:  Thought content normal.       DIAGNOSTICS      EKG:   EKG 12 Lead [[id number]]    Collected: 02/18/21 0019    Updated: 02/18/21 0024     Ventricular Rate 99 BPM TSH 1.39 07/27/2020      U/A:  Recent Labs     02/17/21  0019   COLORU YELLOW   WBCUA 20 TO 50   RBCUA 50    MUCUS NOT REPORTED   BACTERIA FEW*   SPECGRAV 1.021   LEUKOCYTESUR NEGATIVE   GLUCOSEU NEGATIVE   AMORPHOUS NOT REPORTED       Imaging/Diagonstics:     Xr Chest (2 Vw)    Result Date: 2/17/2021  EXAMINATION: TWO XRAY VIEWS OF THE CHEST 2/17/2021 11:48 pm COMPARISON: Chest x-ray 07/25/2020. HISTORY: ORDERING SYSTEM PROVIDED HISTORY: AMS TECHNOLOGIST PROVIDED HISTORY: AMS Reason for Exam: AMS Acuity: Acute Type of Exam: Initial Additional signs and symptoms: AMS Relevant Medical/Surgical History: AMS FINDINGS: Cardiac silhouette is enlarged. Mediastinal contours are otherwise suboptimally evaluated due to rotated projection. Lungs demonstrate no focal consolidation or pleural effusion. No pneumothorax appreciated on this projection. Increased perihilar markings noted. Increased perihilar markings. Atelectasis, infectious or inflammatory airway process, and interstitial edema are in the differential.     Ct Head Wo Contrast    Result Date: 2/17/2021  EXAMINATION: CT OF THE HEAD WITHOUT CONTRAST  2/17/2021 11:29 pm TECHNIQUE: CT of the head was performed without the administration of intravenous contrast. Dose modulation, iterative reconstruction, and/or weight based adjustment of the mA/kV was utilized to reduce the radiation dose to as low as reasonably achievable. COMPARISON: MRI brain with and without contrast dated 07/25/2020. HISTORY: ORDERING SYSTEM PROVIDED HISTORY: AMS TECHNOLOGIST PROVIDED HISTORY: AMS Decision Support Exception->Emergency Medical Condition (MA) Reason for Exam: AMS Acuity: Unknown Type of Exam: Unknown FINDINGS: BRAIN/VENTRICLES: There is no acute intracranial hemorrhage, mass effect or midline shift. No abnormal extra-axial fluid collection. The gray-white differentiation is maintained without evidence of an acute infarct. There is no evidence of hydrocephalus.  Mild diffuse cerebral atrophy and chronic white matter ischemic change. Old lacunar infarct at the right caudate head. ORBITS: The visualized portion of the orbits demonstrate no acute abnormality. SINUSES: The visualized paranasal sinuses and mastoid air cells demonstrate no acute abnormality. SOFT TISSUES/SKULL:  No acute abnormality of the visualized skull or soft tissues. No acute intracranial abnormality. ASSESSMENT  and  PLAN     Principal Problem:    UTI (urinary tract infection)  Active Problems:    Type 2 diabetes mellitus with stage 3 chronic kidney disease, without long-term current use of insulin (HCC)    Stage 3 chronic kidney disease    Paroxysmal atrial fibrillation (HCC)    Cellulitis of lower extremity    Hallucinations  Resolved Problems:    * No resolved hospital problems.  *    Plan:  Urinary tract infection  -IV Cipro initiated in ED  --continue on admission  -Urine culture pending    -Hallucinations  -Patient reports people driving into bush in front yard  -Dtr reports no tire tracks or foot prints  -Concerned that patient is having hallucinations  -history of cerebral meningioma  -CT head - no acute abnormality  -monitor for signs of hallucinations    Cellulitis of Bilateral lower legs  -Erythema of BLE with multiple scratch marks  -IV Vancomycin initiated in ED  --Continue on admission to unit  ---pharmacy to dose  -Blood cultures obtained x 2 in ED  -Elevate affected limbs    Atrial fibrillation with RVR  -Patient with history of paroxysmal A. fib  -EKG in ED -Sinus rhythm with sinus arrhythmia  -Converted to A.fib while seeing patient in ED  --Patient has not had HS dose metoprolol  ---Metoprolol 5 mg IV administered in ED  ----HR decreased to 114  -monitor telemetry and VS  -Anticoagulated on Eliquis    Diabetes Mellitus  -Continue home dose metformin   -POCT ac and hs with sliding scale coverage    CKD - stage 3  -Creatinine 0.79  --baseline for this patient    Consultations:

## 2021-02-18 NOTE — ED NOTES
Pt up to bathroom with a walker. This RN gave assistance getting into and out of bed.      Maricela Opitz, RN  02/18/21 8598

## 2021-02-18 NOTE — CONSULTS
70 yo wf with confusion and hallucinations . She has history of right anterior middle cranial fossa meningioma along with metabolic encephalopathy from left lower extremity cellulitis back in July 2020 seen on consultation at [facility] with right anterior middle cranial fossa 18 x 21 mm lesion consistent with meningioma seen at that time by neurosurgery recommending conservative management. She presents to [address] [facility] with visual hallucinations seeing clown driving up her front yard running over plant seeing people in car and yelling at car . She has been found to have cellulitis in bilateral lower extremities . MRI of Head today with overall stable anterior right middle cranial fossa meningioma 2.3 cm in size with some pressure over anterior temporal lobe . Mild chronic periventricular small vessel ischemia with old right caudate lacune She does have history of atrial fibrillation on eliquis with RVR in ER also on plavix with CAD . There is no history of seizures or any witnessed seizure activity . Significant medications plavix 75 mg po qd , eliquis 5 mg po bid , lipitor 40 mg po qd . Testing CTA head and neck Right ICA 60 % stenosis . Left ICA 30 % stenosis . Left PCA 50 % stenosis, July 2020  . MRI of Head with right anterior middle cranial fossa 18 x 21 mm lesion consistent with meningioma, July 2020 . EEG mild diffuse slowing , July 2020. MRI of Head today with overall stable anterior right middle cranial fossa meningioma 2.3 cm in size with some pressure over anterior temporal lobe .  Mild chronic periventricular small vessel ischemia with old right caudate lacune      Past Medical History:   Diagnosis Date    Acute CVA (cerebrovascular accident) ([address] 7/25/2020    Arthritis     Brain mass     Cellulitis and abscess     Cellulitis and abscess of unspecified site     Cellulitis of left lower extremity 7/26/2020    CHF (congestive heart failure) (HCC)     Chronic kidney disease, unspecified     Coronary atherosclerosis of native coronary artery     Essential hypertension 2020    Essential hypertension, malignant     Hard of hearing     Hypokalemia 2020    Iron deficiency anemia, unspecified     Myocardial infarction ([address]     Other and unspecified hyperlipidemia     Pure hypercholesterolemia     Toxic metabolic encephalopathy     Type II or unspecified type diabetes mellitus without mention of complication, not stated as uncontrolled     Unspecified asthma(493.90)     Unspecified venous (peripheral) insufficiency     Unspecified vitamin D deficiency        Past Surgical History:   Procedure Laterality Date    CORONARY ARTERY BYPASS GRAFT      x 3, Dr. Horacio Saldana         Family History   Problem Relation Age of Onset    Diabetes Mother     Heart Disease Mother     Heart Disease Father     Diabetes Sister     High Blood Pressure Sister     Diabetes Maternal Grandmother        Social History     Socioeconomic History    Marital status:      Spouse name: None    Number of children: None    Years of education: None    Highest education level: None   Occupational History    None   Social Needs    Financial resource strain: None    Food insecurity     Worry: None     Inability: None    Transportation needs     Medical: None     Non-medical: None   Tobacco Use    Smoking status: Former Smoker     Packs/day: 0.25     Years: 10.00     Pack years: 2.50     Quit date: 2000     Years since quittin.1    Smokeless tobacco: Never Used   Substance and Sexual Activity    Alcohol use:  Yes     Alcohol/week: 0.0 standard drinks    Drug use: No    Sexual activity: None   Lifestyle    Physical activity     Days per week: None     Minutes per session: None    Stress: None   Relationships    Social connections     Talks on phone: None     Gets together: None     Attends Evangelical service: None     Active member of club or organization: None     Attends meetings of clubs or organizations: None     Relationship status: None    Intimate partner violence     Fear of current or ex partner: None     Emotionally abused: None     Physically abused: None     Forced sexual activity: None   Other Topics Concern    None   Social History Narrative    None       Current Facility-Administered Medications   Medication Dose Route Frequency Provider Last Rate Last Admin    therapeutic multivitamin-minerals 1 tablet  1 tablet Oral Daily MAL Baker CNP   1 tablet at 02/18/21 0945    metFORMIN (GLUCOPHAGE) tablet 500 mg  500 mg Oral BID WC MAL Baker CNP   500 mg at 02/18/21 0945    sodium chloride flush 0.9 % injection 10 mL  10 mL Intravenous 2 times per day MAL Baker CNP   10 mL at 02/18/21 0946    sodium chloride flush 0.9 % injection 10 mL  10 mL Intravenous PRN MAL Baker CNP        potassium chloride (KLOR-CON M) extended release tablet 40 mEq  40 mEq Oral PRN MAL Baker CNP        Or    potassium bicarb-citric acid (EFFER-K) effervescent tablet 40 mEq  40 mEq Oral PRN MAL Baker CNP        Or    potassium chloride 10 mEq/100 mL IVPB (Peripheral Line)  10 mEq Intravenous PRN MAL Baker CNP        magnesium sulfate 1000 mg in dextrose 5% 100 mL IVPB  1,000 mg Intravenous PRN MAL Baker CNP        promethazine (PHENERGAN) tablet 12.5 mg  12.5 mg Oral Q6H PRN MAL Baker CNP        Or    ondansetron (ZOFRAN) injection 4 mg  4 mg Intravenous Q6H PRN AML Baker CNP        polyethylene glycol (GLYCOLAX) packet 17 g  17 g Oral Daily PRN MAL Baker CNP        acetaminophen (TYLENOL) tablet 650 mg  650 mg Oral Q6H PRN MAL Baker CNP   650 mg at 02/18/21 1655    Or    acetaminophen (TYLENOL) suppository 650 mg  650 mg Rectal Q6H PRN MAL Baker CNP        ciprofloxacin (CIPRO) IVPB 400 mg  400 mg Intravenous Q12H Allan Riding, APRN -  mL/hr at 02/18/21 1621 400 mg at 02/18/21 1621    vancomycin (VANCOCIN) intermittent dosing (placeholder)   Other RX Placeholder Aleksander Felton DO        metoprolol tartrate (LOPRESSOR) tablet 50 mg  50 mg Oral BID Allan Riding, APRN - CNP   50 mg at 02/18/21 1000    glucose (GLUTOSE) 40 % oral gel 15 g  15 g Oral PRN Allan Riding, APRN - CNP        dextrose 50 % IV solution  12.5 g Intravenous PRN Allan Riding, APRN - CNP        glucagon (rDNA) injection 1 mg  1 mg Intramuscular PRN Allan Riding, APRN - CNP        dextrose 5 % solution  100 mL/hr Intravenous PRN Allan Riding, APRN - CNP        insulin lispro (HUMALOG) injection vial 0-6 Units  0-6 Units Subcutaneous TID WC Allan Riding, APRN - CNP        insulin lispro (HUMALOG) injection vial 0-3 Units  0-3 Units Subcutaneous Nightly Allan Riding, APRN - CNP        apixaban (ELIQUIS) tablet 5 mg  5 mg Oral BID Allan Riding, APRN - CNP   5 mg at 02/18/21 0930    clopidogrel (PLAVIX) tablet 75 mg  75 mg Oral Daily Allan Riding, APRN - CNP   75 mg at 02/18/21 0724    vitamin D (ERGOCALCIFEROL) capsule 50,000 Units  50,000 Units Oral Weekly Allan Riding, APRN - CNP   50,000 Units at 02/18/21 0930    furosemide (LASIX) tablet 40 mg  40 mg Oral BID Allan Riding, APRN - CNP   40 mg at 02/18/21 0930    potassium chloride (KLOR-CON M) extended release tablet 20 mEq  20 mEq Oral Daily Allan Riding, APRN - CNP   20 mEq at 02/18/21 0945    desloratadine (CLARINEX) tablet 5 mg  5 mg Oral Daily Allan Riding, APRN - CNP   5 mg at 02/18/21 0930    doxycycline (VIBRAMYCIN) 100 mg in dextrose 5 % 100 mL IVPB  100 mg Intravenous Q12H Shirin Talbot MD        sodium chloride flush 0.9 % injection 10 mL  10 mL Intravenous PRN Shirin Talbot MD   10 mL at 02/18/21 1555    hydroCHLOROthiazide (HYDRODIURIL) tablet 25 mg  25 mg Oral Daily Shirin Talbot MD   25 mg at 02/18/21 1628       Allergies   Allergen Reactions  Aleve [Naproxen Sodium]      Chronic kidney disease stage III, CHF    Pioglitazone Other (See Comments)     Congestive heart failure    Claritin [Loratadine]     Keflex [Cephalexin]     Lisinopril      Needs clarification of contraindication       ROS:   Constitutional                  Negative for fever and chills   HEENT                            Negative for ear discharge, ear pain, nosebleed  Eyes                                Negative for photophobia, pain and discharge  Respiratory                      Negative for hemoptysis and sputum  Cardiovascular                Negative for orthopnea, claudication and PND  Gastrointestinal               Negative for abdominal pain, diarrhea, blood in stool  Musculoskeletal               Negative for joint pain, negative for myalgia  Skin                                 Negative for rash or itching  Endo/heme/allergies       Negative for polydipsia, environmental allergy  Psychiatric                       Negative for suicidal ideation. Patient is not anxious    Vitals:    02/18/21 1315   BP: (!) 172/78   Pulse: 96   Resp: 15   Temp: 98.9 °F (37.2 °C)   SpO2: 97%     Admission weight: 140 lb (63.5 kg)    Neurological Examination  Constitutional .General exam well groomed   Head/ Ears /Nose/Throat/external ear . Normal exam  Neck and thyroid . Normal size. No bruits  Cardiovascular: Auscultation of heart with regular rate and rhythm   Musculoskeletal. Muscle bulk and tone normal                                                           Muscle strength lifting all limbs equally                                                                                 No dysmetria or dysdiadokinesis  No tremor   Normal fine motor  Orientation Alert and oriented x 2   Attention and concentration reduced  Short term memory reduced  Language process and speech normal . No aphasia   Cranial nerve 2 normal acuety and visual fields  Cranial nerve 3, 4 and 6 . Extraocular muscles are

## 2021-02-18 NOTE — PLAN OF CARE
Problem: Infection:  Goal: Will remain free from infection  Description: Will remain free from infection  2/18/2021 1705 by Bassam Miller RN  Outcome: Ongoing  2/18/2021 0436 by Niecy Otero RN  Outcome: Ongoing     Problem: Safety:  Goal: Free from accidental physical injury  Description: Free from accidental physical injury  2/18/2021 1705 by Bassam Miller RN  Outcome: Ongoing  2/18/2021 0436 by Niecy Otero RN  Outcome: Ongoing  Goal: Free from intentional harm  Description: Free from intentional harm  2/18/2021 1705 by Bassam Miller RN  Outcome: Ongoing  2/18/2021 0436 by Niecy Otero RN  Outcome: Ongoing     Problem: Daily Care:  Goal: Daily care needs are met  Description: Daily care needs are met  2/18/2021 1705 by Bassam Miller RN  Outcome: Ongoing  2/18/2021 0436 by Niecy Otero RN  Outcome: Ongoing     Problem: Pain:  Goal: Patient's pain/discomfort is manageable  Description: Patient's pain/discomfort is manageable  2/18/2021 1705 by Bassam Miller RN  Outcome: Ongoing  2/18/2021 0436 by Niecy Otero RN  Outcome: Ongoing     Problem: Skin Integrity:  Goal: Skin integrity will stabilize  Description: Skin integrity will stabilize  2/18/2021 1705 by Bassam Miller RN  Outcome: Ongoing  2/18/2021 0436 by Niecy Otero RN  Outcome: Ongoing     Problem: Falls - Risk of:  Goal: Will remain free from falls  Description: Will remain free from falls  Outcome: Ongoing  Goal: Absence of physical injury  Description: Absence of physical injury  Outcome: Ongoing

## 2021-02-18 NOTE — PROGRESS NOTES
Pharmacy Note  Vancomycin Consult    Sharon Labs is a 69 y.o. female started on Vancomycin for cellulitis of BLE; consult received from Dr. Davina Colmenares to manage therapy. Also receiving the following antibiotics: cipro. Patient Active Problem List   Diagnosis    Vitamin D deficiency    Venous insufficiency of both lower extremities    Type 2 diabetes mellitus with stage 3 chronic kidney disease, without long-term current use of insulin (HCC)    Coronary artery disease involving native coronary artery of native heart without angina pectoris    Iron deficiency anemia    Stage 3 chronic kidney disease    Colonoscopy refused    Pneumococcal vaccination declined    Impaired hearing    Chronic diastolic CHF (congestive heart failure) ([facility])    Chronic obstructive pulmonary disease (HCC)    HTN. Benign hypertension with CKD (chronic kidney disease) stage III ([facility])    Gout with tophi    Hyperlipidemia with target LDL less than 70    Dry skin dermatitis HANDS    Meningioma, cerebral (HCC)    Paroxysmal atrial fibrillation ([facility])    Head contusion    Influenza vaccination declined    Cellulitis of lower extremity    UTI (urinary tract infection)    Hallucinations     Allergies:  Aleve [naproxen sodium], Pioglitazone, Claritin [loratadine], Keflex [cephalexin], and Lisinopril     Temp max: 98.2    Recent Labs     02/17/21  2320   BUN 47*   CREATININE 0.79   WBC 8.8       Intake/Output Summary (Last 24 hours) at 2/18/2021 0444  Last data filed at 2/18/2021 0354  Gross per 24 hour   Intake --   Output 450 ml   Net -450 ml     Culture Date      Source                       Results  2/17                     urine  2/18                     BC X2    Ht Readings from Last 1 Encounters:   02/17/21 4' 11\" (1.499 m)        Wt Readings from Last 1 Encounters:   02/17/21 140 lb (63.5 kg)       Body mass index is 28.28 kg/m². CrCl cannot be calculated (Unknown ideal weight.).     Goal Trough Level: 10-20 mcg/mL    Assessment/Plan:  Will initiate Vancomycin with a one time loading dose of 1500 mg x1, followed by 1000 mg IV every 24 hours. Timing of trough level will be determined based on culture results, renal function, and clinical response. Thank you for the consult. Will continue to follow.

## 2021-02-18 NOTE — PROGRESS NOTES
Physical Therapy    Facility/Department: [address] [place] [address] CARE  Initial Assessment    NAME: Manuel Oliver  : 1950  MRN: [id number]    Date of Service: 2021    Discharge Recommendations:  Home with assist PRN   PT Equipment Recommendations  Equipment Needed: No    Assessment   Body structures, Functions, Activity limitations: Decreased balance;Decreased functional mobility   Assessment: Impaired mobility due to decreased balance  Decision Making: Low Complexity  History: CVA  Exam: decreased balance  Clinical Presentation: satble  REQUIRES PT FOLLOW UP: Yes  Activity Tolerance  Activity Tolerance: Patient Tolerated treatment well       Patient Diagnosis(es): The primary encounter diagnosis was Acute cystitis with hematuria. Diagnoses of Visual hallucination and Cellulitis of lower extremity, unspecified laterality were also pertinent to this visit. has a past medical history of Acute CVA (cerebrovascular accident) ([address] Arthritis, Brain mass, Cellulitis and abscess, Cellulitis and abscess of unspecified site, Cellulitis of left lower extremity, CHF (congestive heart failure) ([address] Chronic kidney disease, unspecified, Coronary atherosclerosis of native coronary artery, Essential hypertension, Essential hypertension, malignant, Hard of hearing, Hypokalemia, Iron deficiency anemia, unspecified, Myocardial infarction ([address] Other and unspecified hyperlipidemia, Pure hypercholesterolemia, Toxic metabolic encephalopathy, Type II or unspecified type diabetes mellitus without mention of complication, not stated as uncontrolled, Unspecified asthma(493.90), Unspecified venous (peripheral) insufficiency, and Unspecified vitamin D deficiency. has a past surgical history that includes Tonsillectomy and adenoidectomy and Coronary artery bypass graft.          Vision/Hearing  Vision: Impaired  Vision Exceptions: Wears glasses at all times  Hearing: Exceptions to [facility]  Hearing Exceptions: Hard of hearing/hearing concerns     Subjective  General  Family / Caregiver Present: Yes  Follows Commands: Within Functional Limits  Subjective  Subjective: pt pleasant and cooperative- hopes to go home today  Pain Screening  Patient Currently in Pain: Denies  Vital Signs  Patient Currently in Pain: Denies       Orientation  Orientation  Overall Orientation Status: Within Functional Limits  Social/Functional History  Social/Functional History  Lives With: Son  Type of Home: House  Home Layout: Two level, 1/2 bath on main level  Home Access: Stairs to enter with rails  Entrance Stairs - Number of Steps: 3  Entrance Stairs - Rails: Right  Bathroom Shower/Tub: Tub/Shower unit  Bathroom Toilet: Standard  Bathroom Equipment: Grab bars in shower  Bathroom Accessibility: Accessible  Home Equipment: Rolling walker  Receives Help From: Family  ADL Assistance: Independent  Ambulation Assistance: Independent(uses walker only outside of home)  Transfer Assistance: Independent  Additional Comments: son works during the day; daughter lives in town and available as needed     Objective          AROM RLE (degrees)  RLE AROM: WNL  AROM LLE (degrees)  LLE AROM : WNL  Strength RLE  Strength RLE: WFL  Comment: grossly 4/5  Strength LLE  Strength LLE: WFL  Comment: grossly 4/5        Bed mobility  Supine to Sit: Supervision  Sit to Supine: (pt left up in chair)  Scooting: Supervision  Comment: bed flat  Transfers  Sit to Stand: Supervision  Stand to sit: Supervision  Bed to Chair: Supervision(with walker)  Ambulation  Ambulation?: Yes  Ambulation 1  Surface: level tile  Device: Rolling Walker  Assistance: Stand by assistance  Gait Deviations: Slow Ivania  Distance: 80ft  Comments: no LOB  Stairs/Curb  Stairs?: No     Balance  Sitting - Static: Good  Sitting - Dynamic: Good  Standing - Static: Good;-(SBA without device)  Standing - Dynamic: Fair;+(SBA with device)        Plan   Plan  Times per week: 5-6x/wk  Current Treatment Recommendations: Balance

## 2021-02-18 NOTE — ED NOTES
Mode of arrival (squad #, walk in, police, etc) : walk in        Chief complaint(s): hallucinations, wellness check        Arrival Note (brief scenario, treatment PTA, etc). : Pt arrives in ED requesting a well check. Daughter at bedside states that pt reports seeing a pt today around 215pm who was not there. Daughter reported this to a sister in [place] who is a PA, who told daughter she should request a CT, urine test and blood work to rule out potential infections. Pt has two known masses in her brain and has not followed up with her neurologist in two months due to the pandemic. Pt currently denies chest pain, SOB, NVD, fevers, chills, abdominal pain, urinary issues, or pain anywhere. C= \"Have you ever felt that you should Cut down on your drinking? \"  No  A= \"Have people Annoyed you by criticizing your drinking? \"  No  G= \"Have you ever felt bad or Guilty about your drinking? \"  No  E= \"Have you ever had a drink as an Eye-opener first thing in the morning to steady your nerves or to help a hangover? \"  No      Deferred []      Reason for deferring: N/A    *If yes to two or more: probable alcohol abuse. Alfredo Nugent RN  02/17/21 2011

## 2021-02-18 NOTE — ED NOTES
Pt up to bathroom with walker. This RN assisted pt into and out of bed.      Lauro Fothergill, RN  02/18/21 6154

## 2021-02-18 NOTE — ED PROVIDER NOTES
[address] ED  Emergency Department Encounter  EmergencyMedicine Resident     Pt Zehra Cooper  MRN: [id number]  [place] 1950  Date of evaluation: 2/17/21  PCP:  Kavitha García MD    [address] [place] [address]       Chief Complaint   Patient presents with    Hallucinations    Check-Up       HISTORY OF PRESENT ILLNESS  (Location/Symptom, Timing/Onset, Context/Setting, Quality, Duration, Modifying Factors, Severity.)      Jose J Lind is a 69 y.o. female who presents with \"little of this little that\". Patient is coming by her daughter who states that patient was diagnosed with a \"mass in her brain\" in July, which patient was scheduled to follow-up on 2 months ago but has failed to do so. Patients daughter states that tonight patient saw someone that was not there, and they called a family number who is a physician assistant who states that patient is to come in for CT head and blood work to make sure there is not infection. Daughter states that patient was diagnosed with A. fib several months ago and is on Plavix and Eliquis as well as aspirin. Patient has multiple medical comorbidities. Patient denies any falls or recent trauma.     PAST MEDICAL / SURGICAL / SOCIAL / FAMILY HISTORY      has a past medical history of Acute CVA (cerebrovascular accident) ([address] Arthritis, Brain mass, Cellulitis and abscess, Cellulitis and abscess of unspecified site, Cellulitis of left lower extremity, CHF (congestive heart failure) ([address] Chronic kidney disease, unspecified, Coronary atherosclerosis of native coronary artery, Essential hypertension, Essential hypertension, malignant, Hard of hearing, Hypokalemia, Iron deficiency anemia, unspecified, Myocardial infarction ([address] Other and unspecified hyperlipidemia, Pure hypercholesterolemia, Toxic metabolic encephalopathy, Type II or unspecified type diabetes mellitus without mention of complication, not stated as uncontrolled, Unspecified asthma(493.90), Unspecified venous (peripheral) insufficiency, and Unspecified vitamin D deficiency. has a past surgical history that includes Tonsillectomy and adenoidectomy and Coronary artery bypass graft. Social History     Socioeconomic History    Marital status:      Spouse name: Not on file    Number of children: Not on file    Years of education: Not on file    Highest education level: Not on file   Occupational History    Not on file   Social Needs    Financial resource strain: Not on file    Food insecurity     Worry: Not on file     Inability: Not on file    Transportation needs     Medical: Not on file     Non-medical: Not on file   Tobacco Use    Smoking status: Former Smoker     Packs/day: 0.25     Years: 10.00     Pack years: 2.50     Quit date: 2000     Years since quittin.1    Smokeless tobacco: Never Used   Substance and Sexual Activity    Alcohol use:  Yes     Alcohol/week: 0.0 standard drinks    Drug use: No    Sexual activity: Not on file   Lifestyle    Physical activity     Days per week: Not on file     Minutes per session: Not on file    Stress: Not on file   Relationships    Social connections     Talks on phone: Not on file     Gets together: Not on file     Attends Nondenominational service: Not on file     Active member of club or organization: Not on file     Attends meetings of clubs or organizations: Not on file     Relationship status: Not on file    Intimate partner violence     Fear of current or ex partner: Not on file     Emotionally abused: Not on file     Physically abused: Not on file     Forced sexual activity: Not on file   Other Topics Concern    Not on file   Social History Narrative    Not on file       Family History   Problem Relation Age of Onset    Diabetes Mother     Heart Disease Mother     Heart Disease Father     Diabetes Sister     High Blood Pressure Sister     Diabetes Maternal Grandmother        Allergies:  Aleve [naproxen sodium], Pioglitazone, Claritin [loratadine], Keflex [cephalexin], and Lisinopril    Home Medications:  Prior to Admission medications    Medication Sig Start Date End Date Taking? Authorizing Provider   potassium chloride (K-TAB) 10 MEQ extended release tablet Take 2 tablets by mouth daily Take with furosemide. Due to low potassium 10/28/20   Consuelo Loya MD   magnesium oxide (MAG-OX) 400 (240 Mg) MG tablet Take 1 tablet by mouth daily Due to low magnesium 10/28/20   Consuelo Loya MD   potassium chloride (K-TAB) 10 MEQ extended release tablet Take 2 tablets by mouth daily Take with furosemide 10/28/20   Consuelo Loya MD   magnesium oxide (MAG-OX) 400 (240 Mg) MG tablet Take 1 tablet by mouth daily 10/28/20   Consuelo Loya MD   clopidogrel (PLAVIX) 75 MG tablet Take 1 tablet by mouth daily 10/6/20   Consuelo Loya MD   furosemide (LASIX) 40 MG tablet TAKE ONE TABLET BY MOUTH TWICE A DAY 10/1/20   Consuelo Loya MD   desloratadine (CLARINEX) 5 MG tablet Take 1 tablet by mouth daily 10/1/20   Consuelo Loya MD   metFORMIN (GLUCOPHAGE) 500 MG tablet Take 1 tablet by mouth 2 times daily (with meals) 10/1/20   Consuelo Loya MD   mupirocin (BACTROBAN) 2 % ointment Apply topically 2 times daily on the affected area for 7-10 days.  OK to substitute to cream 10/1/20   Consuelo Loya MD   vitamin D (ERGOCALCIFEROL) 1.25 MG (20152 UT) CAPS capsule Take 1 capsule by mouth once a week 9/28/20   Geraldine Muller MD   apixaban (ELIQUIS) 5 MG TABS tablet Take 1 tablet by mouth 2 times daily 7/31/20   Alejandra Santoyo APRN - [facility]   metoprolol tartrate (LOPRESSOR) 50 MG tablet Take 1 tablet by mouth 2 times daily 3/3/20   Consuelo Loya MD   Multiple Vitamins-Minerals (THERAPEUTIC MULTIVITAMIN-MINERALS) tablet Take 1 tablet by mouth daily    Historical Provider, MD   atorvastatin (LIPITOR) 40 MG tablet Take 1 tablet by mouth daily ** stop simvastatin& fenofibrate ** 2/20/20 is soft. Tenderness: There is no abdominal tenderness. There is no guarding. Musculoskeletal:         General: No swelling, tenderness, deformity or signs of injury. Skin:     Comments: Lateral lower extremity thickening and redness of the skin, slightly warmer on the right ankle greater than the left, evidence of scabbing and patient scratching at lesions, no streaking of the leg. Neurological:      General: No focal deficit present. Mental Status: She is alert and oriented to person, place, and time. Cranial Nerves: No cranial nerve deficit. Sensory: No sensory deficit. Motor: No weakness.    Psychiatric:         Mood and Affect: Mood normal.         DIFFERENTIAL  DIAGNOSIS     PLAN (LABS / IMAGING / EKG):  Orders Placed This Encounter   Procedures    Culture, Urine    Culture, Blood 1    Culture, Blood 1    CT HEAD WO CONTRAST    XR CHEST (2 VW)    CBC Auto Differential    Basic Metabolic Prof    Urinalysis with Microscopic    Troponin    Lactic Acid    Protime-INR    Telemetry Monitoring    EKG 12 Lead    PATIENT STATUS (FROM ED OR OR/PROCEDURAL) Observation       MEDICATIONS ORDERED:  Orders Placed This Encounter   Medications    ciprofloxacin (CIPRO) IVPB 400 mg     Order Specific Question:   Antimicrobial Indications     Answer:   Urinary Tract Infection       DDX: Intraparenchymal hemorrhage, mass, infection, cellulitis    DIAGNOSTIC RESULTS / EMERGENCY DEPARTMENT COURSE / MDM   :  Results for orders placed or performed during the hospital encounter of 02/17/21   CBC Auto Differential   Result Value Ref Range    WBC 8.8 3.5 - 11.0 k/uL    RBC 4.41 4.0 - 5.2 m/uL    Hemoglobin 13.2 12.0 - 16.0 g/dL    Hematocrit 41.3 36 - 46 %    MCV 93.7 80 - 100 fL    MCH 30.1 26 - 34 pg    MCHC 32.1 31 - 37 g/dL    RDW 16.2 (H) 11.5 - 14.9 %    Platelets 610 795 - 540 k/uL    MPV 9.3 6.0 - 12.0 fL    NRBC Automated NOT REPORTED per 100 WBC    Differential Type NOT REPORTED Seg Neutrophils 72 (H) 36 - 66 %    Lymphocytes 17 (L) 24 - 44 %    Monocytes 7 1 - 7 %    Eosinophils % 3 0 - 4 %    Basophils 1 0 - 2 %    Immature Granulocytes NOT REPORTED 0 %    Segs Absolute 6.40 1.3 - 9.1 k/uL    Absolute Lymph # 1.50 1.0 - 4.8 k/uL    Absolute Mono # 0.60 0.1 - 1.3 k/uL    Absolute Eos # 0.20 0.0 - 0.4 k/uL    Basophils Absolute 0.00 0.0 - 0.2 k/uL    Absolute Immature Granulocyte NOT REPORTED 0.00 - 0.30 k/uL    WBC Morphology NOT REPORTED     RBC Morphology NOT REPORTED     Platelet Estimate NOT REPORTED    Basic Metabolic Prof   Result Value Ref Range    Glucose 143 (H) 70 - 99 mg/dL    BUN 47 (H) 8 - 23 mg/dL    CREATININE 0.79 0.50 - 0.90 mg/dL    Bun/Cre Ratio NOT REPORTED 9 - 20    Calcium 10.1 8.6 - 10.4 mg/dL    Sodium 140 135 - 144 mmol/L    Potassium 4.0 3.7 - 5.3 mmol/L    Chloride 104 98 - 107 mmol/L    CO2 24 20 - 31 mmol/L    Anion Gap 12 9 - 17 mmol/L    GFR Non-African American >60 >60 mL/min    GFR African American >60 >60 mL/min    GFR Comment          GFR Staging NOT REPORTED    Urinalysis with Microscopic   Result Value Ref Range    Color, UA YELLOW YELLOW    Turbidity UA CLOUDY (A) CLEAR    Glucose, Ur NEGATIVE NEGATIVE    Bilirubin Urine NEGATIVE NEGATIVE    Ketones, Urine NEGATIVE NEGATIVE    Specific Pateros, UA 1.021 1.000 - 1.030    Urine Hgb LARGE (A) NEGATIVE    pH, UA 5.5 5.0 - 8.0    Protein, UA 4+ (A) NEGATIVE    Urobilinogen, Urine Normal Normal    Nitrite, Urine NEGATIVE NEGATIVE    Leukocyte Esterase, Urine NEGATIVE NEGATIVE    Urinalysis Comments NOT REPORTED     -          WBC, UA 20 TO 50 /HPF    RBC, UA 50  /HPF    Casts UA NOT REPORTED /LPF    Crystals, UA NOT REPORTED None /HPF    Epithelial Cells UA 2 TO 5 /HPF    Renal Epithelial, UA NOT REPORTED 0 /HPF    Bacteria, UA FEW (A) None    Mucus, UA NOT REPORTED None    Trichomonas, UA NOT REPORTED None    Amorphous, UA NOT REPORTED None    Other Observations UA NOT REPORTED NOT REQ.     Yeast, UA NOT REPORTED None   Troponin   Result Value Ref Range    Troponin, High Sensitivity 24 (H) 0 - 14 ng/L    Troponin T NOT REPORTED <0.03 ng/mL    Troponin Interp NOT REPORTED    EKG 12 Lead   Result Value Ref Range    Ventricular Rate 99 BPM    Atrial Rate 99 BPM    P-R Interval 172 ms    QRS Duration 82 ms    Q-T Interval 344 ms    QTc Calculation (Bazett) 441 ms    P Axis 5 degrees    R Axis 3 degrees    T Axis 132 degrees           RADIOLOGY:  EXAMINATION:   TWO XRAY VIEWS OF THE CHEST       2/17/2021 11:48 pm       COMPARISON:   Chest x-ray 07/25/2020.       HISTORY:   ORDERING SYSTEM PROVIDED HISTORY: AMS   TECHNOLOGIST PROVIDED HISTORY:   AMS   Reason for Exam: AMS   Acuity: Acute   Type of Exam: Initial   Additional signs and symptoms: AMS   Relevant Medical/Surgical History: AMS       FINDINGS:   Cardiac silhouette is enlarged. Mediastinal contours are otherwise   suboptimally evaluated due to rotated projection. Lungs demonstrate no focal   consolidation or pleural effusion. No pneumothorax appreciated on this   projection.  Increased perihilar markings noted.           Impression   Increased perihilar markings.  Atelectasis, infectious or inflammatory airway   process, and interstitial edema are in the differential.     EXAMINATION:   CT OF THE HEAD WITHOUT CONTRAST  2/17/2021 11:29 pm       TECHNIQUE:   CT of the head was performed without the administration of intravenous   contrast. Dose modulation, iterative reconstruction, and/or weight based   adjustment of the mA/kV was utilized to reduce the radiation dose to as low   as reasonably achievable.       COMPARISON:   MRI brain with and without contrast dated 07/25/2020.       HISTORY:   ORDERING SYSTEM PROVIDED HISTORY: AMS   TECHNOLOGIST PROVIDED HISTORY:   AMS       Decision Support Exception->Emergency Medical Condition (MA)   Reason for Exam: AMS   Acuity: Unknown   Type of Exam: Unknown       FINDINGS:   BRAIN/VENTRICLES: There is no acute intracranial hemorrhage, mass effect or   midline shift.  No abnormal extra-axial fluid collection.  The gray-white   differentiation is maintained without evidence of an acute infarct.  There is   no evidence of hydrocephalus. Mild diffuse cerebral atrophy and chronic white   matter ischemic change.  Old lacunar infarct at the right caudate head.       ORBITS: The visualized portion of the orbits demonstrate no acute abnormality.       SINUSES: The visualized paranasal sinuses and mastoid air cells demonstrate   no acute abnormality.       SOFT TISSUES/SKULL:  No acute abnormality of the visualized skull or soft   tissues.           Impression   No acute intracranial abnormality. EKG  Sinus rhythm, normal axis, rate 9 bpm, normal intervals, T wave flattening in V5 V6, no acute ST abnormalities no signs of acute ischemia    All EKG's are interpreted by the Emergency Department Physician who either signs or Co-signs this chart in the absence of a cardiologist.    EMERGENCY DEPARTMENT COURSE/IMPRESSION: 50-year-old female present emergency department for concern for possible visual hallucinations and a medical checkup. Patient has no specific complaint, patient ambulate in the department using her walker, is well-appearing on exam, is not afebrile. Patient does have chronic skin changes on her lower bilateral ankles with mild erythema, possible cellulitis, basic lab work was unremarkable. However patient did have urinary tract infection, with her age being greater than 71 and possible visual hallucinations concern for complicated UTI we will plan to admit with IV antibiotics, blood cultures obtained as well as lactate. Patient admitted to medicine service    PROCEDURES:  None    CONSULTS:  None    CRITICAL CARE:  None    FINAL IMPRESSION      1. Acute cystitis with hematuria    2. Visual hallucination    3.  Cellulitis of lower extremity, unspecified laterality          DISPOSITION / PLAN     DISPOSITION Admission    PATIENT REFERRED TO:  No follow-up provider specified.     DISCHARGE MEDICATIONS:  New Prescriptions    No medications on file       Fatemeh Jorge DO  Emergency Medicine Resident    (Please note that portions of thisnote were completed with a voice recognition program.  Efforts were made to edit the dictations but occasionally words are mis-transcribed.)     Fatemeh Jorge DO  Resident  02/18/21 8172

## 2021-02-19 LAB
ANION GAP SERPL CALCULATED.3IONS-SCNC: 11 MMOL/L (ref 9–17)
ANION GAP SERPL CALCULATED.3IONS-SCNC: 12 MMOL/L (ref 9–17)
BUN BLDV-MCNC: 38 MG/DL (ref 8–23)
BUN BLDV-MCNC: 46 MG/DL (ref 8–23)
BUN/CREAT BLD: ABNORMAL (ref 9–20)
BUN/CREAT BLD: ABNORMAL (ref 9–20)
CALCIUM SERPL-MCNC: 9 MG/DL (ref 8.6–10.4)
CALCIUM SERPL-MCNC: 9.5 MG/DL (ref 8.6–10.4)
CHLORIDE BLD-SCNC: 102 MMOL/L (ref 98–107)
CHLORIDE BLD-SCNC: 103 MMOL/L (ref 98–107)
CO2: 21 MMOL/L (ref 20–31)
CO2: 24 MMOL/L (ref 20–31)
CREAT SERPL-MCNC: 1.22 MG/DL (ref 0.5–0.9)
CREAT SERPL-MCNC: 1.24 MG/DL (ref 0.5–0.9)
CULTURE: ABNORMAL
CULTURE: NORMAL
GFR AFRICAN AMERICAN: 52 ML/MIN
GFR AFRICAN AMERICAN: 53 ML/MIN
GFR NON-AFRICAN AMERICAN: 43 ML/MIN
GFR NON-AFRICAN AMERICAN: 43 ML/MIN
GFR SERPL CREATININE-BSD FRML MDRD: ABNORMAL ML/MIN/{1.73_M2}
GLUCOSE BLD-MCNC: 104 MG/DL (ref 65–105)
GLUCOSE BLD-MCNC: 105 MG/DL (ref 65–105)
GLUCOSE BLD-MCNC: 118 MG/DL (ref 70–99)
GLUCOSE BLD-MCNC: 126 MG/DL (ref 70–99)
GLUCOSE BLD-MCNC: 135 MG/DL (ref 65–105)
GLUCOSE BLD-MCNC: 161 MG/DL (ref 65–105)
HCT VFR BLD CALC: 40 % (ref 36–46)
HEMOGLOBIN: 12.8 G/DL (ref 12–16)
INR BLD: 1.1
Lab: ABNORMAL
Lab: NORMAL
MCH RBC QN AUTO: 30.4 PG (ref 26–34)
MCHC RBC AUTO-ENTMCNC: 32.1 G/DL (ref 31–37)
MCV RBC AUTO: 94.7 FL (ref 80–100)
NRBC AUTOMATED: ABNORMAL PER 100 WBC
PDW BLD-RTO: 16.1 % (ref 11.5–14.9)
PLATELET # BLD: 185 K/UL (ref 150–450)
PMV BLD AUTO: 9.4 FL (ref 6–12)
POTASSIUM SERPL-SCNC: 3.8 MMOL/L (ref 3.7–5.3)
POTASSIUM SERPL-SCNC: 4 MMOL/L (ref 3.7–5.3)
PROTHROMBIN TIME: 14.6 SEC (ref 11.8–14.6)
RBC # BLD: 4.22 M/UL (ref 4–5.2)
SODIUM BLD-SCNC: 135 MMOL/L (ref 135–144)
SODIUM BLD-SCNC: 138 MMOL/L (ref 135–144)
SPECIMEN DESCRIPTION: ABNORMAL
SPECIMEN DESCRIPTION: NORMAL
WBC # BLD: 7.8 K/UL (ref 3.5–11)

## 2021-02-19 PROCEDURE — 6370000000 HC RX 637 (ALT 250 FOR IP): Performed by: NURSE PRACTITIONER

## 2021-02-19 PROCEDURE — 80048 BASIC METABOLIC PNL TOTAL CA: CPT

## 2021-02-19 PROCEDURE — 2500000003 HC RX 250 WO HCPCS: Performed by: INTERNAL MEDICINE

## 2021-02-19 PROCEDURE — 85027 COMPLETE CBC AUTOMATED: CPT

## 2021-02-19 PROCEDURE — 97165 OT EVAL LOW COMPLEX 30 MIN: CPT

## 2021-02-19 PROCEDURE — 2580000003 HC RX 258: Performed by: INTERNAL MEDICINE

## 2021-02-19 PROCEDURE — 6360000002 HC RX W HCPCS: Performed by: NURSE PRACTITIONER

## 2021-02-19 PROCEDURE — 99233 SBSQ HOSP IP/OBS HIGH 50: CPT | Performed by: INTERNAL MEDICINE

## 2021-02-19 PROCEDURE — 85610 PROTHROMBIN TIME: CPT

## 2021-02-19 PROCEDURE — 97535 SELF CARE MNGMENT TRAINING: CPT

## 2021-02-19 PROCEDURE — 2060000000 HC ICU INTERMEDIATE R&B

## 2021-02-19 PROCEDURE — 82947 ASSAY GLUCOSE BLOOD QUANT: CPT

## 2021-02-19 PROCEDURE — 99232 SBSQ HOSP IP/OBS MODERATE 35: CPT | Performed by: PSYCHIATRY & NEUROLOGY

## 2021-02-19 PROCEDURE — 95819 EEG AWAKE AND ASLEEP: CPT | Performed by: PSYCHIATRY & NEUROLOGY

## 2021-02-19 PROCEDURE — 6370000000 HC RX 637 (ALT 250 FOR IP): Performed by: INTERNAL MEDICINE

## 2021-02-19 PROCEDURE — 2580000003 HC RX 258: Performed by: NURSE PRACTITIONER

## 2021-02-19 PROCEDURE — 36415 COLL VENOUS BLD VENIPUNCTURE: CPT

## 2021-02-19 PROCEDURE — 95819 EEG AWAKE AND ASLEEP: CPT

## 2021-02-19 RX ORDER — SPIRONOLACTONE 25 MG/1
25 TABLET ORAL DAILY
Status: DISCONTINUED | OUTPATIENT
Start: 2021-02-19 | End: 2021-02-20 | Stop reason: HOSPADM

## 2021-02-19 RX ORDER — SODIUM CHLORIDE 9 MG/ML
INJECTION, SOLUTION INTRAVENOUS CONTINUOUS
Status: ACTIVE | OUTPATIENT
Start: 2021-02-19 | End: 2021-02-19

## 2021-02-19 RX ORDER — METOPROLOL TARTRATE 5 MG/5ML
5 INJECTION INTRAVENOUS EVERY 8 HOURS PRN
Status: DISCONTINUED | OUTPATIENT
Start: 2021-02-19 | End: 2021-02-20 | Stop reason: HOSPADM

## 2021-02-19 RX ADMIN — ACETAMINOPHEN 650 MG: 325 TABLET ORAL at 20:13

## 2021-02-19 RX ADMIN — POTASSIUM CHLORIDE 20 MEQ: 1500 TABLET, EXTENDED RELEASE ORAL at 07:55

## 2021-02-19 RX ADMIN — SPIRONOLACTONE 25 MG: 25 TABLET, FILM COATED ORAL at 17:09

## 2021-02-19 RX ADMIN — CIPROFLOXACIN 400 MG: 2 INJECTION, SOLUTION INTRAVENOUS at 01:41

## 2021-02-19 RX ADMIN — CLOPIDOGREL BISULFATE 75 MG: 75 TABLET ORAL at 07:55

## 2021-02-19 RX ADMIN — METOPROLOL TARTRATE 50 MG: 50 TABLET, FILM COATED ORAL at 07:55

## 2021-02-19 RX ADMIN — SODIUM CHLORIDE, PRESERVATIVE FREE 10 ML: 5 INJECTION INTRAVENOUS at 08:06

## 2021-02-19 RX ADMIN — FUROSEMIDE 40 MG: 40 TABLET ORAL at 17:01

## 2021-02-19 RX ADMIN — SODIUM CHLORIDE: 9 INJECTION, SOLUTION INTRAVENOUS at 11:38

## 2021-02-19 RX ADMIN — DOXYCYCLINE 100 MG: 100 INJECTION, POWDER, LYOPHILIZED, FOR SOLUTION INTRAVENOUS at 00:41

## 2021-02-19 RX ADMIN — DOXYCYCLINE 100 MG: 100 INJECTION, POWDER, LYOPHILIZED, FOR SOLUTION INTRAVENOUS at 14:30

## 2021-02-19 RX ADMIN — ACETAMINOPHEN 650 MG: 325 TABLET ORAL at 01:39

## 2021-02-19 RX ADMIN — HYDROCHLOROTHIAZIDE 25 MG: 25 TABLET ORAL at 07:55

## 2021-02-19 RX ADMIN — METOPROLOL TARTRATE 5 MG: 1 INJECTION, SOLUTION INTRAVENOUS at 11:48

## 2021-02-19 RX ADMIN — DESLORATADINE 5 MG: 5 TABLET ORAL at 07:55

## 2021-02-19 RX ADMIN — FUROSEMIDE 40 MG: 40 TABLET ORAL at 07:56

## 2021-02-19 RX ADMIN — ACETAMINOPHEN 650 MG: 325 TABLET ORAL at 14:30

## 2021-02-19 RX ADMIN — INSULIN LISPRO 1 UNITS: 100 INJECTION, SOLUTION INTRAVENOUS; SUBCUTANEOUS at 11:53

## 2021-02-19 RX ADMIN — MULTIPLE VITAMINS W/ MINERALS TAB 1 TABLET: TAB at 07:55

## 2021-02-19 RX ADMIN — METOPROLOL TARTRATE 5 MG: 1 INJECTION, SOLUTION INTRAVENOUS at 23:06

## 2021-02-19 RX ADMIN — CIPROFLOXACIN 400 MG: 2 INJECTION, SOLUTION INTRAVENOUS at 11:48

## 2021-02-19 RX ADMIN — METOPROLOL TARTRATE 50 MG: 50 TABLET, FILM COATED ORAL at 20:13

## 2021-02-19 ASSESSMENT — PAIN SCALES - GENERAL
PAINLEVEL_OUTOF10: 3
PAINLEVEL_OUTOF10: 4

## 2021-02-19 NOTE — PROGRESS NOTES
[facility] Internal Medicine    Progress Note    2/19/2021    11:17 AM    Name:   Mira Gutierrez  MRN:     [id number]     Acct:      [de-identified]   Room:   208/208401   Day:  0  Admit Date:  2/17/2021 10:51 PM    PCP:   Rogelio Galeano MD  Code Status:  Full Code    Subjective:     C/C:   Chief Complaint   Patient presents with    Hallucinations    Check-Up         Interval History Status: No further hallucinations but patient has developed mild JOY will rehydrate and recheck creatinine, cellulitis improving    HPI:     67-year-old female known cerebral meningioma, paroxysmal A. fib on Eliquis, type 2 diabetes, CKD, presenting with hallucinations and driving her car off the road while responding to these hallucinations    Review of Systems:     Denies any shortness of breath or cough  Denies chest pain or palpitations  Denies abdominal pain, diarrhea vomiting  Denies any new numbness tremors or weakness. Medications: Allergies:     Allergies   Allergen Reactions    Aleve [Naproxen Sodium]      Chronic kidney disease stage III, CHF    Pioglitazone Other (See Comments)     Congestive heart failure    Claritin [Loratadine]     Keflex [Cephalexin]     Lisinopril      Needs clarification of contraindication       Current Meds:   Scheduled Meds:    therapeutic multivitamin-minerals  1 tablet Oral Daily    [Held by provider] metFORMIN  500 mg Oral BID WC    sodium chloride flush  10 mL Intravenous 2 times per day    ciprofloxacin  400 mg Intravenous Q12H    metoprolol tartrate  50 mg Oral BID    insulin lispro  0-6 Units Subcutaneous TID WC    insulin lispro  0-3 Units Subcutaneous Nightly    apixaban  5 mg Oral BID    clopidogrel  75 mg Oral Daily    vitamin D  50,000 Units Oral Weekly    furosemide  40 mg Oral BID    potassium chloride  20 mEq Oral Daily    desloratadine  5 mg Oral Daily    doxycycline (VIBRAMYCIN) IV  100 mg Intravenous Q12H    hydroCHLOROthiazide  25 mg Oral Daily     Continuous Infusions:    dextrose       PRN Meds: sodium chloride flush, potassium chloride **OR** potassium alternative oral replacement **OR** potassium chloride, magnesium sulfate, promethazine **OR** ondansetron, polyethylene glycol, acetaminophen **OR** acetaminophen, glucose, dextrose, glucagon (rDNA), dextrose, sodium chloride flush    Data:     Past Medical History:   has a past medical history of Acute CVA (cerebrovascular accident) ([facility] Arthritis, Brain mass, Cellulitis and abscess, Cellulitis and abscess of unspecified site, Cellulitis of left lower extremity, CHF (congestive heart failure) ([facility] Chronic kidney disease, unspecified, Coronary atherosclerosis of native coronary artery, Essential hypertension, Essential hypertension, malignant, Hard of hearing, Hypokalemia, Iron deficiency anemia, unspecified, Myocardial infarction ([facility] Other and unspecified hyperlipidemia, Pure hypercholesterolemia, Toxic metabolic encephalopathy, Type II or unspecified type diabetes mellitus without mention of complication, not stated as uncontrolled, Unspecified asthma(493.90), Unspecified venous (peripheral) insufficiency, and Unspecified vitamin D deficiency. Social History:   reports that she quit smoking about 21 years ago. She has a 2.50 pack-year smoking history. She has never used smokeless tobacco. She reports current alcohol use. She reports that she does not use drugs.      Family History:   Family History   Problem Relation Age of Onset    Diabetes Mother     Heart Disease Mother     Heart Disease Father     Diabetes Sister     High Blood Pressure Sister     Diabetes Maternal Grandmother        Vitals:  BP (!) 182/64   Pulse 90   Temp 97.4 °F (36.3 °C) (Axillary)   Resp 18   Ht 4' 11\" (1.499 m)   Wt 148 lb 2.4 oz (67.2 kg)   SpO2 98%   BMI 29.92 kg/m²   Temp (24hrs), Av °F (36.7 °C), Min:97.4 °F (36.3 °C), Max:98.9 °F (37.2 °C)    Recent Labs 02/18/21  1337 02/18/21  1557 02/18/21  2105 02/19/21  0725   POCGLU 128* 158* 112* 105       I/O (24Hr): Intake/Output Summary (Last 24 hours) at 2/19/2021 1117  Last data filed at 2/19/2021 0959  Gross per 24 hour   Intake 1280 ml   Output 1950 ml   Net -670 ml       Labs:    Lab Results   Component Value Date    WBC 7.8 02/19/2021    HGB 12.8 02/19/2021    HCT 40.0 02/19/2021    MCV 94.7 02/19/2021     02/19/2021     Lab Results   Component Value Date     02/19/2021    K 3.8 02/19/2021     02/19/2021    CO2 21 02/19/2021    BUN 38 02/19/2021    CREATININE 1.22 02/19/2021    GLUCOSE 126 02/19/2021    CALCIUM 9.5 02/19/2021          Lab Results   Component Value Date/Time    SPECIAL BOTH 6 ML LEFT FOREARM 02/18/2021 01:15 AM     Lab Results   Component Value Date/Time    CULTURE NO GROWTH 21 HOURS 02/18/2021 01:15 AM         Radiology:    Recent data reviewed    Physical Examination:        General appearance:  alert, cooperative and no distress  Eyes: Anicteric sclera. Pupils are equally round and reactive to light. Extraocular movements are intact. Lungs:  clear to auscultation bilaterally, normal effort  Heart:  regular rate and rhythm, no murmur  Abdomen:  soft, nontender, nondistended, normal bowel sounds, no masses, hepatomegaly, splenomegaly  Extremities:  no edema, right lower leg cellulitis, improving, no redness or tenderness of calves.   Skin:  no gross lesions, rashes, induration  Neuro:  Alert, oriented X 3,. Non-focal.  Assessment:        Primary Problem  UTI (urinary tract infection)    Active Hospital Problems    Diagnosis Date Noted    UTI (urinary tract infection) [N39.0] 02/18/2021    Hallucinations [R44.3] 02/18/2021    Cellulitis of lower extremity [L03.119] 10/04/2020    Paroxysmal atrial fibrillation ([address] [I48.0] 07/28/2020    Toxic metabolic encephalopathy [E75] 07/26/2020    Meningioma, cerebral ([address] [D32.0] 07/26/2020    Chronic diastolic [facility]congestive [facility] failure) [facility] [I50.32] 02/20/2020    Type 2 diabetes mellitus with stage 3 chronic kidney disease, without long-term current use of insulin (HCC) [E11.22, N18.30]     Stage 3 chronic kidney disease [N18.30]     Coronary artery disease involving native coronary artery of native heart without angina pectoris [I25.10]            DVT prophylaxis: Eliquis  Antibiotics: Day 3 ciprofloxacin, D2 doxycycline    Plan:        69-year-old female known cerebral meningioma, paroxysmal A. fib on Eliquis, type 2 diabetes, CKD, presenting with hallucinations and dangerous behavior  1. Hallucinations-reported prior to admission with subsequently driving to bush-   2. UTI without sepsis-IV Cipro.,  Urine culture awaited  3. Leg cellulitis-started on IV vancomycin but patient developed allergic rash, vancomycin was discontinued. switcched to doxy today  4. A. fib RVR-patient has known paroxysmal A. fib, reverted to sinus rhythm with multiple PVCs after 1 dose of 5 mg IV Lopressor. Patient did miss her nighttime dose of oral Lopressor. 5. Chronic diastolic CHF-currently compensated     Prior neurology note reviewed from July 2020-similar presentation at that time-encephalopathy secondary to bilateral cellulitis treated with doxycycline. Plan for meningioma was outpatient MRI monitoring in 6 months, apparently patient never got this MRI. CT head has not been picking up this meningioma-no mention of meningioma and CT head from 7/2020, at presentation this time.      Body mass index is 26.98 kg/m². 12/19  MRI yesterday shows mass-effect in the temporal region, neurology consulted, recommend EEG to be done today. Patient subjectively feels well no further hallucinations, however mild JOY today-creatinine uptrending-we will give gentle hydration and repeat this afternoon. Urine output good, will watch closely for fluid overload.   Coagulase-negative staph in 1 blood culture, will follow  Hypertension-elevated blood pressure prior to medication administration-we will recheck blood pressure  Urine culture no growth so far-we will continue with Cipro for now.   Right leg cellulitis looking much improved from prior    Dispo: Murali Snow MD  2/19/2021  11:17 AM

## 2021-02-19 NOTE — PROCEDURES
[address] [facility]                 [address] [facility], [address]                          ELECTROENCEPHALOGRAM REPORT    PATIENT NAME: Vero MARTINS                   :        1950  MED REC NO:   [id number]                              ROOM:       2084  ACCOUNT NO:   [de-identified]                           ADMIT DATE: 2021  PROVIDER:     Zenobia Romano MD    DATE OF EE2021    ATTENDING OF RECORD:  Chandana Goldman MD    REASON FOR STUDY:  This is a 67-year-old patient with encephalopathy,  visual hallucination, cellulitis, history of right middle cranial fossa  meningioma. MEDICATIONS:  Include ciprofloxacin, Lopressor, insulin. EEG FINDINGS:  This is a 16-channel EEG and one EKG channel recording  performed in a patient described to be awake, drowsy and asleep. The  patient shows normal waking rhythms. Background activity consists of  well-regulated 9 Hz activity in the 40-60 microvolt range more prominent  over the posterior head areas showing reactivity to eye opening and  closing. Over the anterior head regions, there are 15-20 Hz activity in  the 20-30 microvolt range. The record is prominent for mild  intermittent medium amplitude 4-7 Hz activity seen over the right  hemisphere over right temporal head area admixed with intermittent mild  right temporal sharp wave formation of medium amplitude range. With  drowsiness and sleep, there is further intrusion of slower frequencies  in the theta and lesser degree in the delta band. Hyperventilation is  not performed. Photic stimulation shows bioccipital entrainment in  several frequencies. IMPRESSION:  This EEG shows the presence of mild intermittent right  hemispheric temporal slowing with sharp wave formation consistent with  history of right middle cranial fossa meningioma. Clinical correlation  is warranted.         Nazia Jacinto MD D: 02/19/2021 16:28:21       T: 02/19/2021 16:38:08     [email or web address]  Job#: [id number]     Doc#: [id number]    CC:

## 2021-02-19 NOTE — CARE COORDINATION
DISCHARGE PLANNING NOTE:    Plan is for this patient to return to home where she lives with her son and she wants outpatient PT - orders in place. Remains on IV cipro, IV vanco     Eliquis PTA    MRI brain showed unchanged meningioma    EEG today    Neuro on board. Will continue to follow along.      Electronically signed by Jorge Frost RN on 2/19/2021 at 4:36 PM

## 2021-02-19 NOTE — DISCHARGE INSTR - COC
Continuity of Care Form    Patient Name: Erin Hayes   :    MRN:  [id number]    Admit date:  2021  Discharge date:  ***    Code Status Order: Full Code   Advance Directives:   [facility] Documentation       Date/Time Healthcare Directive Type of Healthcare Directive Copy in [facility] [address] Agent's Name Healthcare Agent's Phone Number    21 0457  No, patient does not have an advance directive for healthcare treatment -- -- -- -- --            Admitting Physician:  Anthony Ibrahim MD  PCP: Lucian Escobar MD    Discharging Nurse: [facility] Unit/Room#: 5902/6409-19  Discharging Unit Phone Number: ***    Emergency Contact:   Extended Emergency Contact Information  Primary Emergency Contact: Luisito Palomino Phone: [phone]  Relation: Child  Secondary Emergency Contact: CYRIL NELSON  Mobile Phone: [phone]  Relation: Child  Preferred language: English    Past Surgical History:  Past Surgical History:   Procedure Laterality Date    CORONARY ARTERY BYPASS GRAFT      x 3, Dr. Adelaida Gilford         Immunization History: There is no immunization history on file for this patient. Active Problems:  Patient Active Problem List   Diagnosis Code    Vitamin D deficiency E55.9    Venous insufficiency of both lower extremities I87.2    Type 2 diabetes mellitus with stage 3 chronic kidney disease, without long-term current use of insulin ([facility]) E11.22, N18.30    Coronary artery disease involving native coronary artery of native heart without angina pectoris I25.10    Iron deficiency anemia D50.9    Stage 3 chronic kidney disease N18.30    Colonoscopy refused Z53.20    Pneumococcal vaccination declined Z28.21    Impaired hearing H91.90    Chronic diastolic CHF (congestive heart failure) ([facility]) I50.32    Chronic obstructive pulmonary disease ([facility]) J44.9    HTN.  Benign hypertension with CKD (chronic kidney disease) stage III (HCC) I12.9, N18.30    Gout with tophi M1A. 9XX1    Hyperlipidemia with target LDL less than 70 E78.5    Dry skin dermatitis HANDS F63.2    Toxic metabolic encephalopathy E17    Meningioma, cerebral (HCC) D32.0    Paroxysmal atrial fibrillation (HCC) I48.0    Head contusion S00.93XA    Influenza vaccination declined Z28.21    Cellulitis of lower extremity L03.119    UTI (urinary tract infection) N39.0    Hallucinations R44.3       Isolation/Infection:   Isolation            No Isolation          Patient Infection Status       Infection Onset Added Last Indicated Last Indicated By Review Planned Expiration Resolved Resolved By    None active    Resolved    COVID-19 Rule Out 07/25/20 07/25/20 07/25/20 COVID-19 (Ordered)   07/25/20 Rule-Out Test Resulted            Nurse Assessment:  Last Vital Signs: BP (!) 182/64   Pulse 90   Temp 97.4 °F (36.3 °C) (Axillary)   Resp 18   Ht 4' 11\" (1.499 m)   Wt 148 lb 2.4 oz (67.2 kg)   SpO2 98%   BMI 29.92 kg/m²     Last documented pain score (0-10 scale): Pain Level: 4  Last Weight:   Wt Readings from Last 1 Encounters:   02/19/21 148 lb 2.4 oz (67.2 kg)     Mental Status:  {IP PT MENTAL STATUS:20030:::0}    IV Access:  { WILL IV ACCESS:917220744:::0}    Nursing Mobility/ADLs:  Walking   {CHP DME ADLs:251814620:::0}  Transfer  {CHP DME ADLs:909602643:::0}  Bathing  {CHP DME ADLs:536298084:::0}  Dressing  {CHP DME ADLs:880115875:::0}  Toileting  {CHP DME ADLs:602145974:::0}  Feeding  {CHP DME ADLs:595338210:::0}  Med Admin  {CHP DME ADLs:031121080:::0}  Med Delivery   { WILL MED Delivery:829477581:::0}    Wound Care Documentation and Therapy:        Elimination:  Continence:    Bowel: {YES / GI:90811}  Bladder: {YES / KB:75757}  Urinary Catheter: {Urinary Catheter:494513182:::0}   Colostomy/Ileostomy/Ileal Conduit: {YES / VR:67147}       Date of Last BM: ***    Intake/Output Summary (Last 24 hours) at 2/19/2021 1130  Last data filed at 2/19/2021 0959  Gross per 24 hour   Intake 1280 ml   Output 1950 ml   Net -670 ml     I/O last 3 completed shifts:   In: 1280 [P.O.:660; IV Piggyback:620]  Out: 0252 [Urine:1750]    Safety Concerns:     [address] Safety Concerns:425012322:::0}    Impairments/Disabilities:      [address] Impairments/Disabilities:371695356:::0}    Nutrition Therapy:  Current Nutrition Therapy:   [address] Diet List:502804213:::0}    Routes of Feeding: {[facility] DME Other Feedings:572428338:::0}  Liquids: {Slp liquid thickness:87408}  Daily Fluid Restriction: {CHP DME Yes amt example:120491598:::0}  Last Modified Barium Swallow with Video (Video Swallowing Test): {Done Not Done XPO:::9}    Treatments at the Time of Hospital Discharge:   Respiratory Treatments: ***  Oxygen Therapy:  {Therapy; copd oxygen:95363:::0}  Ventilator:    {[facility] Vent List:764747829:::0}    Rehab Therapies: {THERAPEUTIC INTERVENTION:6018285646}  Weight Bearing Status/Restrictions: {[facility] Weight Bearin:::0}  Other Medical Equipment (for information only, NOT a DME order):  {EQUIPMENT:136154252}  Other Treatments: ***    Patient's personal belongings (please select all that are sent with patient):  {[facility] DME Belongings:247672954:::0}    RN SIGNATURE:  {Esignature:896598181:::0}    CASE MANAGEMENT/SOCIAL WORK SECTION    Inpatient Status Date: ***    Readmission Risk Assessment Score:  Readmission Risk              Risk of Unplanned Readmission:        0           Discharging to Facility/ Agency   Name:   Address:  Phone:  Fax:    Dialysis Facility (if applicable)   Name:  Address:  Dialysis Schedule:  Phone:  Fax:    / signature: {Esignature:865090065:::0}    PHYSICIAN SECTION    Prognosis: {Prognosis:6898951177:::0}    Condition at Discharge: [address] Patient Condition:795590575:::0}    Rehab Potential (if transferring to Rehab): {Prognosis:2584493797:::0}    Recommended Labs or Other Treatments After Discharge: ***    Physician Certification: I certify the above information and transfer of Erin Hayes  is necessary for the continuing treatment of the diagnosis listed and that she requires {Admit to Appropriate Level of Care:75728:::0} for {GREATER/LESS:455677931} 30 days.      Update Admission H&P: {CHP DME Changes in HandP:822425119:::0}    PHYSICIAN SIGNATURE:  Electronically signed by Yohan Campbell MD on 2/19/21 at 11:30 AM EST

## 2021-02-19 NOTE — PLAN OF CARE
Problem: Infection:  Goal: Will remain free from infection  Description: Will remain free from infection  Outcome: Ongoing     Problem: Safety:  Goal: Free from accidental physical injury  Description: Free from accidental physical injury  Outcome: Ongoing  Goal: Free from intentional harm  Description: Free from intentional harm  Outcome: Ongoing     Problem: Daily Care:  Goal: Daily care needs are met  Description: Daily care needs are met  Outcome: Ongoing     Problem: Pain:  Goal: Patient's pain/discomfort is manageable  Description: Patient's pain/discomfort is manageable  Outcome: Ongoing     Problem: Skin Integrity:  Goal: Skin integrity will stabilize  Description: Skin integrity will stabilize  Outcome: Ongoing     Problem: Falls - Risk of:  Goal: Will remain free from falls  Description: Will remain free from falls  Outcome: Ongoing  Note: Patient remains free of falls and injuries throughout shift. Bed remains in the lowest position, wheels locked, call light and bedside table are within reach.    Goal: Absence of physical injury  Description: Absence of physical injury  Outcome: Ongoing     Problem: Musculor/Skeletal Functional Status  Goal: Highest potential functional level  Outcome: Ongoing  Goal: Absence of falls  Outcome: Ongoing

## 2021-02-19 NOTE — PROGRESS NOTES
Active problem Toxic metabolic encephalopathy from cellulitis . Right middle cranial fossa meningioma . The condition is she is improved alert and oriented x3 with no visual hallucinations . She is hard of hearing with no focal motor ,sensory , bulbar or visual complaints . EEG mild intermittent right hemispheric temporal slowing with sharp wave formation . There have been no further hallucinations . There is acute kidney injury  BUN/creatinie 46/1.24 . 70 yo wf with confusion and hallucinations . She has history of right anterior middle cranial fossa meningioma along with metabolic encephalopathy from left lower extremity cellulitis back in July 2020 seen on consultation at [facility] . MRI of Head with right anterior middle cranial fossa 18 x 21 mm lesion consistent with meningioma seen at that time by neurosurgery recommending conservative management. She presents to [facility] with visual hallucinations seeing clown driving up her front yard running over plant seeing people in car and yelling at car . She has been found to have cellulitis in bilateral lower extremities . MRI of Head today with overall stable anterior right middle cranial fossa meningioma 2.3 cm in size with some pressure over anterior temporal lobe . Mild chronic periventricular small vessel ischemia with old right caudate lacune She does have history of atrial fibrillation on eliquis with RVR in ER also on plavix with CAD . There is no history of seizures or any witnessed seizure activity . Significant medications plavix 75 mg po qd , eliquis 5 mg po bid , lipitor 40 mg po qd, doxycycline IVPB . Testing CTA head and neck Right ICA 60 % stenosis . Left ICA 30 % stenosis . Left PCA 50 % stenosis, July 2020  . MRI of Head with right anterior middle cranial fossa 18 x 21 mm lesion consistent with meningioma, July 2020 . EEG mild diffuse slowing , July 2020.  MRI of Head today with overall stable anterior right middle cranial fossa meningioma 2.3 cm in size with some pressure over anterior temporal lobe . Mild chronic periventricular small vessel ischemia with old right caudate lacune . EEG mild intermittent right hemispheric temporal slowing with sharp wave formation      Past Medical History:   Diagnosis Date    Acute CVA (cerebrovascular accident) ([facility] [address] 7/25/2020    Arthritis     Brain mass     Cellulitis and abscess     Cellulitis and abscess of unspecified site     Cellulitis of left lower extremity 7/26/2020    CHF (congestive heart failure) (HCC)     Chronic kidney disease, unspecified     Coronary atherosclerosis of native coronary artery     Essential hypertension 7/25/2020    Essential hypertension, malignant     Hard of hearing     Hypokalemia 9/9/2020    Iron deficiency anemia, unspecified     Myocardial infarction ([facility] [address]     Other and unspecified hyperlipidemia     Pure hypercholesterolemia     Toxic metabolic encephalopathy 7/29/9956    Type II or unspecified type diabetes mellitus without mention of complication, not stated as uncontrolled     Unspecified asthma(493.90)     Unspecified venous (peripheral) insufficiency     Unspecified vitamin D deficiency        Past Surgical History:   Procedure Laterality Date    CORONARY ARTERY BYPASS GRAFT      x 3, Dr. Doug Yarbrough         Family History   Problem Relation Age of Onset    Diabetes Mother     Heart Disease Mother     Heart Disease Father     Diabetes Sister     High Blood Pressure Sister     Diabetes Maternal Grandmother        Social History     Socioeconomic History    Marital status:       Spouse name: None    Number of children: None    Years of education: None    Highest education level: None   Occupational History    None   Social Needs    Financial resource strain: None    Food insecurity     Worry: None     Inability: None    Transportation needs     Medical: None     Non-medical: None   Tobacco Use    Smoking status: Former Smoker     Packs/day: 0.25     Years: 10.00     Pack years: 2.50     Quit date: 2000     Years since quittin.1    Smokeless tobacco: Never Used   Substance and Sexual Activity    Alcohol use:  Yes     Alcohol/week: 0.0 standard drinks    Drug use: No    Sexual activity: None   Lifestyle    Physical activity     Days per week: None     Minutes per session: None    Stress: None   Relationships    Social connections     Talks on phone: None     Gets together: None     Attends [employer] service: None     Active member of club or organization: None     Attends meetings of clubs or organizations: None     Relationship status: None    Intimate partner violence     Fear of current or ex partner: None     Emotionally abused: None     Physically abused: None     Forced sexual activity: None   Other Topics Concern    None   Social History Narrative    None       Current Facility-Administered Medications   Medication Dose Route Frequency Provider Last Rate Last Admin    0.9 % sodium chloride infusion   Intravenous Continuous Jaye Epstein  mL/hr at 21 1138 New Bag at 21 1138    metoprolol (LOPRESSOR) injection 5 mg  5 mg Intravenous Q8H PRN Jaye Epstein MD   5 mg at 21 1148    spironolactone (ALDACTONE) tablet 25 mg  25 mg Oral Daily Jaye Epstein MD   25 mg at 21 1709    therapeutic multivitamin-minerals 1 tablet  1 tablet Oral Daily MAL Baker CNP   1 tablet at 21 0755    [Held by provider] metFORMIN (GLUCOPHAGE) tablet 500 mg  500 mg Oral BID WC MAL Baker CNP   500 mg at 21 1751    sodium chloride flush 0.9 % injection 10 mL  10 mL Intravenous 2 times per day MAL Baker CNP   10 mL at 21 0806    sodium chloride flush 0.9 % injection 10 mL  10 mL Intravenous PRN MAL Baker CNP        potassium chloride (KLOR-CON M) extended release tablet 40 mEq  40 mEq Oral PRN MAL Baker CNP groomed   Head/ Ears /Nose/Throat/external ear . Normal exam  Neck and thyroid . Normal size. No bruits  Cardiovascular: Auscultation of heart with regular rate and rhythm   Musculoskeletal. Muscle bulk and tone normal                                                           Muscle strength lifting all limbs equally                                                                                 No dysmetria or dysdiadokinesis  No tremor   Normal fine motor  Orientation Alert and oriented x 3   Attention and concentration normal  Short term memory normal  Language process and speech normal . No aphasia   Cranial nerve 2 normal acuety and visual fields  Cranial nerve 3, 4 and 6 . Extraocular muscles are intact . Pupils are equal and reactive   Cranial nerve 5 . Intact corneal reflex. Normal facial sensation  Cranial nerve 7 normal exam   Cranial nerve 8. Grossly intact hearing   Cranial nerve 9 and 10. Symmetric palate elevation   Cranial nerve 11 , 5 out of 5 strength   Cranial Nerve 12 midline tongue . No atrophy  Sensation . Normal pinprick and light touch   Deep Tendon Reflexes normal  Plantar response flexor bilaterally    Assessment :    Toxic metabolic encephalopathy from cellulitis . Right middle cranial fossa meningioma     Plan:     ATB Rx per medicine .  PT/Ot
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
## 2021-02-19 NOTE — PLAN OF CARE
Problem: Infection:  Goal: Will remain free from infection  Description: Will remain free from infection  2/19/2021 0309 by Diego Gold RN  Outcome: Ongoing  Note: IV Ab's for lower leg cellulitis. 2/18/2021 1705 by Hilda Mendoza RN  Outcome: Ongoing     Problem: Safety:  Goal: Free from accidental physical injury  Description: Free from accidental physical injury  2/19/2021 0309 by Diego Gold RN  Outcome: Ongoing  2/18/2021 1705 by Hilda Mendoza RN  Outcome: Ongoing  Goal: Free from intentional harm  Description: Free from intentional harm  2/19/2021 0309 by Diego Gold RN  Outcome: Ongoing  2/18/2021 1705 by Hilda Mendoza RN  Outcome: Ongoing     Problem: Daily Care:  Goal: Daily care needs are met  Description: Daily care needs are met  2/19/2021 0309 by Diego Gold RN  Outcome: Ongoing  2/18/2021 1705 by Hilda Mendoza RN  Outcome: Ongoing     Problem: Pain:  Goal: Patient's pain/discomfort is manageable  Description: Patient's pain/discomfort is manageable  2/19/2021 0309 by Diego Gold RN  Outcome: Ongoing  Note: Pt had c/o of a headache during this shift. Responded well to tylenol.    2/18/2021 1705 by Hilda Mendoza RN  Outcome: Ongoing     Problem: Skin Integrity:  Goal: Skin integrity will stabilize  Description: Skin integrity will stabilize  2/19/2021 0309 by Diego Gold RN  Outcome: Ongoing  2/18/2021 1705 by Hilda Mendoza RN  Outcome: Ongoing     Problem: Falls - Risk of:  Goal: Will remain free from falls  Description: Will remain free from falls  2/19/2021 0309 by Diego Gold RN  Outcome: Ongoing  2/18/2021 1705 by Hilda Mendoza RN  Outcome: Ongoing  Goal: Absence of physical injury  Description: Absence of physical injury  2/19/2021 0309 by Diego Gold RN  Outcome: Ongoing  2/18/2021 1705 by Hilda Mendoza RN  Outcome: Ongoing

## 2021-02-20 VITALS
BODY MASS INDEX: 29.07 KG/M2 | SYSTOLIC BLOOD PRESSURE: 179 MMHG | OXYGEN SATURATION: 96 % | DIASTOLIC BLOOD PRESSURE: 81 MMHG | TEMPERATURE: 97.5 F | HEART RATE: 81 BPM | RESPIRATION RATE: 18 BRPM | HEIGHT: 59 IN | WEIGHT: 144.18 LBS

## 2021-02-20 LAB
ANION GAP SERPL CALCULATED.3IONS-SCNC: 10 MMOL/L (ref 9–17)
BUN BLDV-MCNC: 49 MG/DL (ref 8–23)
BUN/CREAT BLD: ABNORMAL (ref 9–20)
CALCIUM SERPL-MCNC: 9.3 MG/DL (ref 8.6–10.4)
CHLORIDE BLD-SCNC: 104 MMOL/L (ref 98–107)
CO2: 25 MMOL/L (ref 20–31)
CREAT SERPL-MCNC: 1.07 MG/DL (ref 0.5–0.9)
GFR AFRICAN AMERICAN: >60 ML/MIN
GFR NON-AFRICAN AMERICAN: 51 ML/MIN
GFR SERPL CREATININE-BSD FRML MDRD: ABNORMAL ML/MIN/{1.73_M2}
GFR SERPL CREATININE-BSD FRML MDRD: ABNORMAL ML/MIN/{1.73_M2}
GLUCOSE BLD-MCNC: 103 MG/DL (ref 65–105)
GLUCOSE BLD-MCNC: 105 MG/DL (ref 65–105)
GLUCOSE BLD-MCNC: 128 MG/DL (ref 70–99)
HCT VFR BLD CALC: 39 % (ref 36–46)
HEMOGLOBIN: 12.8 G/DL (ref 12–16)
MCH RBC QN AUTO: 30.8 PG (ref 26–34)
MCHC RBC AUTO-ENTMCNC: 32.9 G/DL (ref 31–37)
MCV RBC AUTO: 93.6 FL (ref 80–100)
NRBC AUTOMATED: ABNORMAL PER 100 WBC
PDW BLD-RTO: 15.7 % (ref 11.5–14.9)
PLATELET # BLD: 186 K/UL (ref 150–450)
PMV BLD AUTO: 8.9 FL (ref 6–12)
POTASSIUM SERPL-SCNC: 4.1 MMOL/L (ref 3.7–5.3)
RBC # BLD: 4.16 M/UL (ref 4–5.2)
SODIUM BLD-SCNC: 139 MMOL/L (ref 135–144)
WBC # BLD: 7.6 K/UL (ref 3.5–11)

## 2021-02-20 PROCEDURE — 82947 ASSAY GLUCOSE BLOOD QUANT: CPT

## 2021-02-20 PROCEDURE — 36415 COLL VENOUS BLD VENIPUNCTURE: CPT

## 2021-02-20 PROCEDURE — 6370000000 HC RX 637 (ALT 250 FOR IP): Performed by: NURSE PRACTITIONER

## 2021-02-20 PROCEDURE — 2580000003 HC RX 258: Performed by: INTERNAL MEDICINE

## 2021-02-20 PROCEDURE — 80048 BASIC METABOLIC PNL TOTAL CA: CPT

## 2021-02-20 PROCEDURE — 2500000003 HC RX 250 WO HCPCS: Performed by: INTERNAL MEDICINE

## 2021-02-20 PROCEDURE — 97116 GAIT TRAINING THERAPY: CPT

## 2021-02-20 PROCEDURE — 6370000000 HC RX 637 (ALT 250 FOR IP): Performed by: INTERNAL MEDICINE

## 2021-02-20 PROCEDURE — 85027 COMPLETE CBC AUTOMATED: CPT

## 2021-02-20 PROCEDURE — 99232 SBSQ HOSP IP/OBS MODERATE 35: CPT | Performed by: PSYCHIATRY & NEUROLOGY

## 2021-02-20 PROCEDURE — 99239 HOSP IP/OBS DSCHRG MGMT >30: CPT | Performed by: INTERNAL MEDICINE

## 2021-02-20 RX ORDER — HYDROCHLOROTHIAZIDE 25 MG/1
25 TABLET ORAL DAILY
Qty: 30 TABLET | Refills: 0 | Status: SHIPPED | OUTPATIENT
Start: 2021-02-20 | End: 2021-03-12 | Stop reason: SDUPTHER

## 2021-02-20 RX ORDER — DOXYCYCLINE HYCLATE 100 MG
100 TABLET ORAL 2 TIMES DAILY
Qty: 14 TABLET | Refills: 0 | Status: SHIPPED | OUTPATIENT
Start: 2021-02-20 | End: 2021-02-27

## 2021-02-20 RX ORDER — SPIRONOLACTONE 25 MG/1
25 TABLET ORAL DAILY
Qty: 30 TABLET | Refills: 0 | Status: SHIPPED | OUTPATIENT
Start: 2021-02-20 | End: 2021-03-12 | Stop reason: SDUPTHER

## 2021-02-20 RX ADMIN — SPIRONOLACTONE 25 MG: 25 TABLET, FILM COATED ORAL at 08:44

## 2021-02-20 RX ADMIN — FUROSEMIDE 40 MG: 40 TABLET ORAL at 08:48

## 2021-02-20 RX ADMIN — METOPROLOL TARTRATE 50 MG: 50 TABLET, FILM COATED ORAL at 08:54

## 2021-02-20 RX ADMIN — ACETAMINOPHEN 650 MG: 325 TABLET ORAL at 05:49

## 2021-02-20 RX ADMIN — MULTIPLE VITAMINS W/ MINERALS TAB 1 TABLET: TAB at 08:44

## 2021-02-20 RX ADMIN — CLOPIDOGREL BISULFATE 75 MG: 75 TABLET ORAL at 08:50

## 2021-02-20 RX ADMIN — POTASSIUM CHLORIDE 20 MEQ: 1500 TABLET, EXTENDED RELEASE ORAL at 08:44

## 2021-02-20 RX ADMIN — DOXYCYCLINE 100 MG: 100 INJECTION, POWDER, LYOPHILIZED, FOR SOLUTION INTRAVENOUS at 00:44

## 2021-02-20 RX ADMIN — HYDROCHLOROTHIAZIDE 25 MG: 25 TABLET ORAL at 08:44

## 2021-02-20 RX ADMIN — DESLORATADINE 5 MG: 5 TABLET ORAL at 08:50

## 2021-02-20 ASSESSMENT — PAIN SCALES - GENERAL: PAINLEVEL_OUTOF10: 3

## 2021-02-20 NOTE — PLAN OF CARE
Problem: Infection:  Goal: Will remain free from infection  Description: Will remain free from infection  2/20/2021 0231 by Fco Patten RN  Outcome: Ongoing  Note: Patient remains afebrile; WBC count is within normal limits; lower leg cellulitis improving, remains on IV antibiotics. 2/19/2021 1803 by Brock Pulliam RN  Outcome: Ongoing     Problem: Safety:  Goal: Free from accidental physical injury  Description: Free from accidental physical injury  2/20/2021 0231 by Fco Patten RN  Outcome: Ongoing  Note: No falls noted this shift. Patient ambulates with x1 staff assistance without difficulty. Bed kept in low position. Safe environment maintained. Bedside table & call light in reach. Uses call light appropriately when needing assistance.      2/19/2021 1803 by Brock Pulliam RN  Outcome: Ongoing  Goal: Free from intentional harm  Description: Free from intentional harm  2/20/2021 0231 by Fco Patten RN  Outcome: Ongoing  2/19/2021 1803 by Brock Pulliam RN  Outcome: Ongoing     Problem: Daily Care:  Goal: Daily care needs are met  Description: Daily care needs are met  2/20/2021 0231 by Fco Patten RN  Outcome: Ongoing  2/19/2021 1803 by Brock Pulliam RN  Outcome: Ongoing     Problem: Pain:  Goal: Patient's pain/discomfort is manageable  Description: Patient's pain/discomfort is manageable  2/20/2021 0231 by Fco Patten RN  Outcome: Ongoing  2/19/2021 1803 by Brock Pulliam RN  Outcome: Ongoing     Problem: Skin Integrity:  Goal: Skin integrity will stabilize  Description: Skin integrity will stabilize  2/20/2021 0231 by Fco Patten RN  Outcome: Ongoing  2/19/2021 1803 by Brock Pulliam RN  Outcome: Ongoing     Problem: Falls - Risk of:  Goal: Will remain free from falls  Description: Will remain free from falls  2/20/2021 0231 by Fco Patten RN  Outcome: Ongoing  2/19/2021 1803 by Brock Pulliam RN  Outcome: Ongoing  Note: Patient remains free of falls and injuries throughout shift. Bed remains in the lowest position, wheels locked, call light and bedside table are within reach.    Goal: Absence of physical injury  Description: Absence of physical injury  2/20/2021 0231 by Chintan Preston RN  Outcome: Ongoing  2/19/2021 1803 by Hanna Dudley RN  Outcome: Ongoing     Problem: Musculor/Skeletal Functional Status  Goal: Highest potential functional level  2/20/2021 0231 by Chintan Preston RN  Outcome: Ongoing  2/19/2021 1803 by Hanna Dudley RN  Outcome: Ongoing  Goal: Absence of falls  2/20/2021 0231 by hCintan Preston RN  Outcome: Ongoing  2/19/2021 1803 by Hanna Dudley RN  Outcome: Ongoing

## 2021-02-20 NOTE — PROGRESS NOTES
Active problem Toxic metabolic encephalopathy from lower extremity cellulitis and UTI . Right middle cranial fossa meningioma . The condition is she walked 200 feet with rolling walker in PT. She is alert and oriented x 3 with no visual hallucinations . She is hard of hearing with no focal motor ,sensory , bulbar or visual complaints . EEG mild intermittent right hemispheric temporal slowing with sharp wave formation . There have been no further hallucinations . Acute kidney injury has improved BUN/creatinine 49 /1.07 . 68 yo wf with confusion and hallucinations . She has history of right anterior middle cranial fossa meningioma along with metabolic encephalopathy from left lower extremity cellulitis back in July 2020 seen on consultation at [facility] . MRI of Head with right anterior middle cranial fossa 18 x 21 mm lesion consistent with meningioma seen at that time by neurosurgery recommending conservative management. She presents to [facility] with visual hallucinations seeing clown driving up her front yard running over plant seeing people in car and yelling at car . She has been found to have cellulitis in bilateral lower extremities . MRI of Head today with overall stable anterior right middle cranial fossa meningioma 2.3 cm in size with some pressure over anterior temporal lobe . Mild chronic periventricular small vessel ischemia with old right caudate lacune She does have history of atrial fibrillation on eliquis with RVR in ER also on plavix with CAD . There is no history of seizures or any witnessed seizure activity . Significant medications plavix 75 mg po qd , eliquis 5 mg po bid , lipitor 40 mg po qd, doxycycline IVPB . Testing CTA head and neck Right ICA 60 % stenosis . Left ICA 30 % stenosis . Left PCA 50 % stenosis, July 2020  . MRI of Head with right anterior middle cranial fossa 18 x 21 mm lesion consistent with meningioma, July 2020 . EEG mild diffuse slowing , July 2020.  MRI of Head today with overall stable anterior right middle cranial fossa meningioma 2.3 cm in size with some pressure over anterior temporal lobe . Mild chronic periventricular small vessel ischemia with old right caudate lacune . EEG mild intermittent right hemispheric temporal slowing with sharp wave formation      Past Medical History:   Diagnosis Date    Acute CVA (cerebrovascular accident) ([facility] [address] 7/25/2020    Arthritis     Brain mass     Cellulitis and abscess     Cellulitis and abscess of unspecified site     Cellulitis of left lower extremity 7/26/2020    CHF (congestive heart failure) (HCC)     Chronic kidney disease, unspecified     Coronary atherosclerosis of native coronary artery     Essential hypertension 7/25/2020    Essential hypertension, malignant     Hard of hearing     Hypokalemia 9/9/2020    Iron deficiency anemia, unspecified     Myocardial infarction ([facility] [address]     Other and unspecified hyperlipidemia     Pure hypercholesterolemia     Toxic metabolic encephalopathy 9/22/1866    Type II or unspecified type diabetes mellitus without mention of complication, not stated as uncontrolled     Unspecified asthma(493.90)     Unspecified venous (peripheral) insufficiency     Unspecified vitamin D deficiency        Past Surgical History:   Procedure Laterality Date    CORONARY ARTERY BYPASS GRAFT      x 3, Dr. Julio César Jay         Family History   Problem Relation Age of Onset    Diabetes Mother     Heart Disease Mother     Heart Disease Father     Diabetes Sister     High Blood Pressure Sister     Diabetes Maternal Grandmother        Social History     Socioeconomic History    Marital status:       Spouse name: None    Number of children: None    Years of education: None    Highest education level: None   Occupational History    None   Social Needs    Financial resource strain: None    Food insecurity     Worry: None     Inability: None    Transportation needs Medical: None     Non-medical: None   Tobacco Use    Smoking status: Former Smoker     Packs/day: 0.25     Years: 10.00     Pack years: 2.50     Quit date: 2000     Years since quittin.1    Smokeless tobacco: Never Used   Substance and Sexual Activity    Alcohol use:  Yes     Alcohol/week: 0.0 standard drinks    Drug use: No    Sexual activity: None   Lifestyle    Physical activity     Days per week: None     Minutes per session: None    Stress: None   Relationships    Social connections     Talks on phone: None     Gets together: None     Attends Jainism service: None     Active member of club or organization: None     Attends meetings of clubs or organizations: None     Relationship status: None    Intimate partner violence     Fear of current or ex partner: None     Emotionally abused: None     Physically abused: None     Forced sexual activity: None   Other Topics Concern    None   Social History Narrative    None       Current Facility-Administered Medications   Medication Dose Route Frequency Provider Last Rate Last Admin    metoprolol (LOPRESSOR) injection 5 mg  5 mg Intravenous Q8H PRN Avril Ibrahim MD   5 mg at 21 2306    spironolactone (ALDACTONE) tablet 25 mg  25 mg Oral Daily Avril Ibrahim MD   25 mg at 21 0844    therapeutic multivitamin-minerals 1 tablet  1 tablet Oral Daily Cassia Vazquez APRN - CNP   1 tablet at 21 0844    metFORMIN (GLUCOPHAGE) tablet 500 mg  500 mg Oral BID WC Cassia Vazquez APRN - CNP   500 mg at 21 1751    sodium chloride flush 0.9 % injection 10 mL  10 mL Intravenous 2 times per day Cassia Vazquez APRN - CNP   10 mL at 21 0806    sodium chloride flush 0.9 % injection 10 mL  10 mL Intravenous PRN Cassia Vazquez APRN - CNP        potassium chloride (KLOR-CON M) extended release tablet 40 mEq  40 mEq Oral PRN Cassia Inocherie, APRN - CNP        Or    potassium bicarb-citric acid (EFFER-K) effervescent tablet 40 mEq  40 mEq Oral PRN Cam Levels, APRN - CNP        Or    potassium chloride 10 mEq/100 mL IVPB (Peripheral Line)  10 mEq Intravenous PRN Cam Levels, APRN - CNP        magnesium sulfate 1000 mg in dextrose 5% 100 mL IVPB  1,000 mg Intravenous PRN Cam Levels, APRN - CNP        promethazine (PHENERGAN) tablet 12.5 mg  12.5 mg Oral Q6H PRN Cam Levels, APRN - CNP        Or    ondansetron [facility]) injection 4 mg  4 mg Intravenous Q6H PRN Cam Levels, APRN - CNP        polyethylene glycol (GLYCOLAX) packet 17 g  17 g Oral Daily PRN Cam Levels, APRN - CNP        acetaminophen (TYLENOL) tablet 650 mg  650 mg Oral Q6H PRN Cam Levels, APRN - CNP   650 mg at 02/20/21 0946    Or    acetaminophen (TYLENOL) suppository 650 mg  650 mg Rectal Q6H PRN Cam Levels, APRN - CNP        metoprolol tartrate (LOPRESSOR) tablet 50 mg  50 mg Oral BID Cam Levels, APRN - CNP   50 mg at 02/20/21 0854    glucose (GLUTOSE) 40 % oral gel 15 g  15 g Oral PRN Cam Levels, APRN - CNP        dextrose 50 % IV solution  12.5 g Intravenous PRN Cam Levels, APRN - CNP        glucagon (rDNA) injection 1 mg  1 mg Intramuscular PRN Cam Levels, APRN - CNP        dextrose 5 % solution  100 mL/hr Intravenous PRN Cam Levels, APRN - CNP        insulin lispro (HUMALOG) injection vial 0-6 Units  0-6 Units Subcutaneous TID WC Cam Levels, APRN - CNP   1 Units at 02/19/21 1153    insulin lispro (HUMALOG) injection vial 0-3 Units  0-3 Units Subcutaneous Nightly Cam Levels, APRN - CNP        apixaban (ELIQUIS) tablet 5 mg  5 mg Oral BID Cam Levels, APRN - CNP   5 mg at 02/20/21 2699    clopidogrel (PLAVIX) tablet 75 mg  75 mg Oral Daily Cam Levels, APRN - CNP   75 mg at 02/20/21 9920    vitamin D (ERGOCALCIFEROL) capsule 50,000 Units  50,000 Units Oral Weekly Cam Levels, APRN - CNP   50,000 Units at 02/18/21 0930    furosemide (LASIX) tablet 40 mg  40 mg Oral BID Cam Levels, APRN - CNP   40 mg at 02/20/21 0848    potassium chloride (KLOR-CON M) extended release tablet 20 mEq  20 mEq Oral Daily Mena RowellMAL - CNP   20 mEq at 02/20/21 0291    desloratadine (CLARINEX) tablet 5 mg  5 mg Oral Daily Mena RowellMAL - CNP   5 mg at 02/20/21 9210    doxycycline (VIBRAMYCIN) 100 mg in dextrose 5 % 100 mL IVPB  100 mg Intravenous Q12H Sean Swanson MD   Stopped at 02/20/21 0144    sodium chloride flush 0.9 % injection 10 mL  10 mL Intravenous PRN Sean Swanson MD   10 mL at 02/18/21 1555    hydroCHLOROthiazide (HYDRODIURIL) tablet 25 mg  25 mg Oral Daily Sean Swanson MD   25 mg at 02/20/21 1892       Allergies   Allergen Reactions    Aleve [Naproxen Sodium]      Chronic kidney disease stage III, CHF    Pioglitazone Other (See Comments)     Congestive heart failure    Claritin [Loratadine]     Keflex [Cephalexin]     Lisinopril      Needs clarification of contraindication       ROS:   Constitutional                  Negative for fever and chills   HEENT                            Negative for ear discharge, ear pain, nosebleed  Eyes                                Negative for photophobia, pain and discharge  Respiratory                      Negative for hemoptysis and sputum  Cardiovascular                Negative for orthopnea, claudication and PND  Gastrointestinal               Negative for abdominal pain, diarrhea, blood in stool  Musculoskeletal               Negative for joint pain, negative for myalgia  Skin                                 Negative for rash or itching  Endo/heme/allergies       Negative for polydipsia, environmental allergy  Psychiatric                       Negative for suicidal ideation. Patient is not anxious    Vitals:    02/20/21 1345   BP: (!) 179/81   Pulse: 81   Resp: 18   Temp: 97.5 °F (36.4 °C)   SpO2: 96%     Admission weight: 140 lb (63.5 kg)    Neurological Examination  Constitutional .General exam well groomed   Head/ Ears /Nose/Throat/external ear . Normal exam  Neck and thyroid .Normal size. No bruits  Cardiovascular: Auscultation of heart with regular rate and rhythm   Musculoskeletal. Muscle bulk and tone normal                                                           Muscle strength lifting all limbs equally                                                                                 No dysmetria or dysdiadokinesis  No tremor   Normal fine motor  Orientation Alert and oriented x 3   Attention and concentration normal  Short term memory normal  Language process and speech normal . No aphasia   Cranial nerve 2 normal acuety and visual fields  Cranial nerve 3, 4 and 6 . Extraocular muscles are intact . Pupils are equal and reactive   Cranial nerve 5 . Intact corneal reflex. Normal facial sensation  Cranial nerve 7 normal exam   Cranial nerve 8. Grossly intact hearing   Cranial nerve 9 and 10. Symmetric palate elevation   Cranial nerve 11 , 5 out of 5 strength   Cranial Nerve 12 midline tongue . No atrophy  Sensation . Normal pinprick and light touch   Deep Tendon Reflexes normal  Plantar response flexor bilaterally    Assessment :    Toxic metabolic encephalopathy from cellulitis .  Right middle cranial fossa meningioma     Plan:     As per medicine

## 2021-02-20 NOTE — PROGRESS NOTES
Physical Therapy  [address]    Physical Therapy Progress Note    Date: 21  Patient Name: Rock Kam       Room: 4410/8072-08  MRN: [id number]   Account: [de-identified]   : 1950  (74 y.o.)   Gender: female     Discharge Recommendations   Home with assist PRN  Equipment Needed: No       Diagnosis: admit - due to hallucinations per MD note -: no further hallucinations, however mild JOY today, RLE cellulitis  Restrictions/Precautions: Fall Risk   Past Medical History:  has a past medical history of Acute CVA (cerebrovascular accident) ([facility] [address] Arthritis, Brain mass, Cellulitis and abscess, Cellulitis and abscess of unspecified site, Cellulitis of left lower extremity, CHF (congestive heart failure) ([facility] [address] Chronic kidney disease, unspecified, Coronary atherosclerosis of native coronary artery, Essential hypertension, Essential hypertension, malignant, Hard of hearing, Hypokalemia, Iron deficiency anemia, unspecified, Myocardial infarction ([facility] [address] Other and unspecified hyperlipidemia, Pure hypercholesterolemia, Toxic metabolic encephalopathy, Type II or unspecified type diabetes mellitus without mention of complication, not stated as uncontrolled, Unspecified asthma(493.90), Unspecified venous (peripheral) insufficiency, and Unspecified vitamin D deficiency. Past Surgical History:   has a past surgical history that includes Tonsillectomy and adenoidectomy and Coronary artery bypass graft.           Restrictions/Precautions  Restrictions/Precautions: Fall Risk    Subjective: pt pleasant and cooperative- hopes to go home today       Vital Signs  Patient Currently in Pain: Denies     Bed Mobility:   Bed Mobility  Scooting: Supervision  Bed mobility  Scooting: Supervision    Transfers:  Sit to Stand: Supervision  Stand to sit: Supervision  Bed to Chair: Supervision(with walker)      Ambulation 1  Surface: level tile  Device: Rolling Walker  Assistance: Stand by assistance  Gait Deviations: Decreased step height(thoracic kyphosis)  Distance: 200 ft        Stairs/Curb  Stairs?: Yes  Stairs  # Steps : 4  Stairs Height: 6\"  Rails: Left ascending(bilateral UE assist)  Assistance: Contact guard assistance;Stand by assistance  Comment: step to, descending retro      Sitting - Static: Good  Sitting - Dynamic: Good  Standing - Static: Good;-(SBA without device)  Standing - Dynamic: Fair;+(SBA with device)     Activity Tolerance: Patient Tolerated treatment well  PT Equipment Recommendations  Equipment Needed: No       Assessment  Activity Tolerance: Patient Tolerated treatment well   Body structures, Functions, Activity limitations: Decreased balance;Decreased functional mobility   Assessment: Impaired mobility due to decreased balance  Discharge Recommendations: Home with assist PRN     Type of devices: Call light within reach; Left in chair; All fall risk precautions in place;Gait belt; Chair alarm in place     Plan  Times per week: 5-6x/wk  Current Treatment Recommendations: Balance Training, Stair training, Gait Training    Patient Education  New Education Provided:    Jessica Holland  Method: explanation       Outcome: demonstrated understanding     Goals  Short term goals  Time Frame for Short term goals: 1-2 days  Short term goal 1: mod-I bed mobility  Short term goal 2: mod-I transfers  Short term goal 3: mod-I gait with RW x 100-150ft  Short term goal 4: negotiate 3-4 steps with rail and SBA    PT Individual Minutes  Time In: (P)   Time Out: (P) 2408  Minutes: (P) 24    Electronically signed by Yvonne Guillaume PTA on 2/20/21 at 12:26 PM EST         02/20/21 1226   PT Individual Minutes   Time In 6015   Time Out 4279   Minutes 24

## 2021-02-20 NOTE — PLAN OF CARE
Problem: Infection:  Goal: Will remain free from infection  Description: Will remain free from infection  2/20/2021 1018 by Don Alexander RN  Outcome: Completed  2/20/2021 0231 by Dragan Schilling RN  Outcome: Ongoing  Note: Patient remains afebrile; WBC count is within normal limits; lower leg cellulitis improving, remains on IV antibiotics. Problem: Safety:  Goal: Free from accidental physical injury  Description: Free from accidental physical injury  2/20/2021 1018 by Don Alexander RN  Outcome: Completed  2/20/2021 0231 by Dragan Schilling RN  Outcome: Ongoing  Note: No falls noted this shift. Patient ambulates with x1 staff assistance without difficulty. Bed kept in low position. Safe environment maintained. Bedside table & call light in reach. Uses call light appropriately when needing assistance.      Goal: Free from intentional harm  Description: Free from intentional harm  2/20/2021 1018 by Don Alexander RN  Outcome: Completed  2/20/2021 0231 by Drgaan Schilling RN  Outcome: Ongoing     Problem: Daily Care:  Goal: Daily care needs are met  Description: Daily care needs are met  2/20/2021 1018 by Don Alexander RN  Outcome: Completed  2/20/2021 0231 by Dragan Schilling RN  Outcome: Ongoing     Problem: Pain:  Goal: Patient's pain/discomfort is manageable  Description: Patient's pain/discomfort is manageable  2/20/2021 1018 by Don Alexander RN  Outcome: Completed  2/20/2021 0231 by Dragan Schilling RN  Outcome: Ongoing     Problem: Skin Integrity:  Goal: Skin integrity will stabilize  Description: Skin integrity will stabilize  2/20/2021 1018 by Don Alexander RN  Outcome: Completed  2/20/2021 0231 by Dragan Schilling RN  Outcome: Ongoing     Problem: Falls - Risk of:  Goal: Will remain free from falls  Description: Will remain free from falls  2/20/2021 1018 by Don Alexander RN  Outcome: Completed  2/20/2021 0231 by Dragan Schilling RN  Outcome: Ongoing  Goal: Absence of physical injury  Description: Absence of physical injury  2/20/2021 1018 by Gordon Osborn RN  Outcome: Completed  2/20/2021 0231 by Isaias Vides RN  Outcome: Ongoing     Problem: Musculor/Skeletal Functional Status  Goal: Highest potential functional level  2/20/2021 1018 by Gordon Osborn RN  Outcome: Completed  2/20/2021 0231 by Isaias Vides RN  Outcome: Ongoing  Goal: Absence of falls  2/20/2021 1018 by Gordon Osborn RN  Outcome: Completed  2/20/2021 0231 by Isaias Vides RN  Outcome: Ongoing

## 2021-02-22 ENCOUNTER — TELEPHONE (OUTPATIENT)
Dept: FAMILY MEDICINE CLINIC | Age: 71
End: 2021-02-22

## 2021-02-22 NOTE — TELEPHONE ENCOUNTER
[facility] Transitions Initial Follow Up Call    Outreach made within 2 business days of discharge: Yes    Patient: Christopher Aviles Patient : 1950   MRN: [id number]  Reason for Admission: There are no discharge diagnoses documented for the most recent discharge. Discharge Date: 21       Spoke with: VOICEMAIL    Discharge department/facility: [address] [place] [address] Interactive Patient Contact:  Was patient able to fill all prescriptions:   Was patient instructed to bring all medications to the follow-up visit:   Is patient taking all medications as directed in the discharge summary? Does patient understand their discharge instructions:   Does patient have questions or concerns that need addressed prior to 7-14 day follow up office visit:     Scheduled appointment with PCP within 7-14 days    Follow Up  No future appointments.     Schuyler Cabot, MA

## 2021-02-23 ENCOUNTER — NURSE TRIAGE (OUTPATIENT)
Dept: OTHER | Facility: CLINIC | Age: 71
End: 2021-02-23

## 2021-02-23 NOTE — TELEPHONE ENCOUNTER
Future Appointments   Date Time Provider Angela Aissatou   2/25/2021  2:15 PM MAL Moreau - CNP [facility]   3/12/2021  2:00 PM Irma Mckinney MD [facility]

## 2021-02-23 NOTE — TELEPHONE ENCOUNTER
Reason for Disposition   Brief confusion (now gone)    Answer Assessment - Initial Assessment Questions  1. LEVEL OF CONSCIOUSNESS: \"How is he (she, the patient) acting right now? \" (e.g., alert-oriented, confused, lethargic, stuporous, comatose)     Hallucinations, seeing things and hearing things     2. ONSET: \"When did the confusion start? \"  (minutes, hours, days)      1 week ago, discharged form hospital on 2/20    3. PATTERN \"Does this come and go, or has it been constant since it started? \"  \"Is it present now? \"     ? 4. ALCOHOL or DRUGS: \"Has he been drinking alcohol or taking any drugs? \"       Denies    5. NARCOTIC MEDICATIONS: \"Has he been receiving any narcotic medications? \" (e.g., morphine, Vicodin)      Denies    6. CAUSE: \"What do you think is causing the confusion? \"       Has been ill with an infection    7. OTHER SYMPTOMS: Norlene Pilar there any other symptoms? \" (e.g., difficulty breathing, headache, fever, weakness)      Denies    Protocols used: CONFUSION - DELIRIUM-ADULT-OH    Patient called Elisabet at [facility] pre-service center [facility])  with red flag complaint. Brief description of triage: as above recently released form [facility] for infection causing hallucinations, daughter calling cause pt still hallucinating    Triage indicates for patient to be seen today    Care advice provided, patient verbalizes understanding; denies any other questions or concerns; instructed to call back for any new or worsening symptoms. Writer provided warm transfer to Janeth Cherry  at [facility], [place] for appointment scheduling. Attention Provider: Thank you for allowing me to participate in the care of your patient. The patient was connected to triage in response to information provided to the [facility]. Please do not respond through this encounter as the response is not directed to a shared pool.

## 2021-02-24 LAB
CULTURE: NORMAL
Lab: NORMAL
SPECIMEN DESCRIPTION: NORMAL

## 2021-02-25 PROBLEM — R79.89 ELEVATED SERUM CREATININE: Status: ACTIVE | Noted: 2021-02-25

## 2021-02-25 PROBLEM — D32.9 MENINGIOMA (HCC): Status: ACTIVE | Noted: 2021-02-25

## 2021-03-08 DIAGNOSIS — E78.5 HYPERLIPIDEMIA WITH TARGET LDL LESS THAN 70: ICD-10-CM

## 2021-03-08 RX ORDER — ATORVASTATIN CALCIUM 40 MG/1
40 TABLET, FILM COATED ORAL DAILY
Qty: 90 TABLET | Refills: 3 | Status: SHIPPED | OUTPATIENT
Start: 2021-03-08 | End: 2021-03-09 | Stop reason: SDUPTHER

## 2021-03-09 DIAGNOSIS — E78.5 HYPERLIPIDEMIA WITH TARGET LDL LESS THAN 70: ICD-10-CM

## 2021-03-09 RX ORDER — ATORVASTATIN CALCIUM 40 MG/1
40 TABLET, FILM COATED ORAL DAILY
Qty: 90 TABLET | Refills: 3 | Status: SHIPPED | OUTPATIENT
Start: 2021-03-09 | End: 2021-03-12 | Stop reason: SDUPTHER

## 2021-03-12 ENCOUNTER — OFFICE VISIT (OUTPATIENT)
Dept: FAMILY MEDICINE CLINIC | Age: 71
End: 2021-03-12
Payer: MEDICARE

## 2021-03-12 VITALS
DIASTOLIC BLOOD PRESSURE: 80 MMHG | TEMPERATURE: 97.6 F | HEIGHT: 59 IN | HEART RATE: 82 BPM | SYSTOLIC BLOOD PRESSURE: 120 MMHG | WEIGHT: 137 LBS | OXYGEN SATURATION: 98 % | BODY MASS INDEX: 27.62 KG/M2

## 2021-03-12 DIAGNOSIS — N18.30 BENIGN HYPERTENSION WITH CKD (CHRONIC KIDNEY DISEASE) STAGE III (HCC): ICD-10-CM

## 2021-03-12 DIAGNOSIS — N18.31 TYPE 2 DIABETES MELLITUS WITH STAGE 3A CHRONIC KIDNEY DISEASE, WITHOUT LONG-TERM CURRENT USE OF INSULIN (HCC): Primary | ICD-10-CM

## 2021-03-12 DIAGNOSIS — E11.22 TYPE 2 DIABETES MELLITUS WITH STAGE 3A CHRONIC KIDNEY DISEASE, WITHOUT LONG-TERM CURRENT USE OF INSULIN (HCC): Primary | ICD-10-CM

## 2021-03-12 DIAGNOSIS — E55.9 VITAMIN D DEFICIENCY: ICD-10-CM

## 2021-03-12 DIAGNOSIS — I50.32 CHRONIC DIASTOLIC CHF (CONGESTIVE HEART FAILURE) (HCC): ICD-10-CM

## 2021-03-12 DIAGNOSIS — E78.5 HYPERLIPIDEMIA WITH TARGET LDL LESS THAN 70: ICD-10-CM

## 2021-03-12 DIAGNOSIS — I12.9 BENIGN HYPERTENSION WITH CKD (CHRONIC KIDNEY DISEASE) STAGE III (HCC): ICD-10-CM

## 2021-03-12 DIAGNOSIS — J44.9 CHRONIC OBSTRUCTIVE PULMONARY DISEASE, UNSPECIFIED COPD TYPE (HCC): ICD-10-CM

## 2021-03-12 DIAGNOSIS — I48.0 PAROXYSMAL ATRIAL FIBRILLATION (HCC): ICD-10-CM

## 2021-03-12 LAB — HBA1C MFR BLD: 5.9 %

## 2021-03-12 PROCEDURE — G8427 DOCREV CUR MEDS BY ELIG CLIN: HCPCS | Performed by: FAMILY MEDICINE

## 2021-03-12 PROCEDURE — 3023F SPIROM DOC REV: CPT | Performed by: FAMILY MEDICINE

## 2021-03-12 PROCEDURE — 3017F COLORECTAL CA SCREEN DOC REV: CPT | Performed by: FAMILY MEDICINE

## 2021-03-12 PROCEDURE — 2022F DILAT RTA XM EVC RTNOPTHY: CPT | Performed by: FAMILY MEDICINE

## 2021-03-12 PROCEDURE — 1111F DSCHRG MED/CURRENT MED MERGE: CPT | Performed by: FAMILY MEDICINE

## 2021-03-12 PROCEDURE — 3044F HG A1C LEVEL LT 7.0%: CPT | Performed by: FAMILY MEDICINE

## 2021-03-12 PROCEDURE — G8400 PT W/DXA NO RESULTS DOC: HCPCS | Performed by: FAMILY MEDICINE

## 2021-03-12 PROCEDURE — 1036F TOBACCO NON-USER: CPT | Performed by: FAMILY MEDICINE

## 2021-03-12 PROCEDURE — G8926 SPIRO NO PERF OR DOC: HCPCS | Performed by: FAMILY MEDICINE

## 2021-03-12 PROCEDURE — 83036 HEMOGLOBIN GLYCOSYLATED A1C: CPT | Performed by: FAMILY MEDICINE

## 2021-03-12 PROCEDURE — 99214 OFFICE O/P EST MOD 30 MIN: CPT | Performed by: FAMILY MEDICINE

## 2021-03-12 PROCEDURE — 1123F ACP DISCUSS/DSCN MKR DOCD: CPT | Performed by: FAMILY MEDICINE

## 2021-03-12 PROCEDURE — 4040F PNEUMOC VAC/ADMIN/RCVD: CPT | Performed by: FAMILY MEDICINE

## 2021-03-12 PROCEDURE — G8417 CALC BMI ABV UP PARAM F/U: HCPCS | Performed by: FAMILY MEDICINE

## 2021-03-12 PROCEDURE — G8484 FLU IMMUNIZE NO ADMIN: HCPCS | Performed by: FAMILY MEDICINE

## 2021-03-12 PROCEDURE — 1090F PRES/ABSN URINE INCON ASSESS: CPT | Performed by: FAMILY MEDICINE

## 2021-03-12 RX ORDER — ATORVASTATIN CALCIUM 40 MG/1
40 TABLET, FILM COATED ORAL DAILY
Qty: 90 TABLET | Refills: 3 | Status: SHIPPED | OUTPATIENT
Start: 2021-03-12 | End: 2021-12-20

## 2021-03-12 RX ORDER — SPIRONOLACTONE 25 MG/1
25 TABLET ORAL
Qty: 90 TABLET | Refills: 3 | Status: SHIPPED | OUTPATIENT
Start: 2021-03-12 | End: 2021-03-18 | Stop reason: SDUPTHER

## 2021-03-12 RX ORDER — HYDROCHLOROTHIAZIDE 25 MG/1
25 TABLET ORAL
Qty: 30 TABLET | Refills: 0 | Status: SHIPPED | OUTPATIENT
Start: 2021-03-12 | End: 2021-03-18 | Stop reason: SDUPTHER

## 2021-03-12 RX ORDER — ALBUTEROL SULFATE 90 UG/1
2 AEROSOL, METERED RESPIRATORY (INHALATION) EVERY 6 HOURS PRN
Qty: 8 G | Refills: 3 | Status: SHIPPED | OUTPATIENT
Start: 2021-03-12 | End: 2022-02-15 | Stop reason: SDUPTHER

## 2021-03-12 ASSESSMENT — ENCOUNTER SYMPTOMS
BACK PAIN: 1
WHEEZING: 0
NAUSEA: 0
COUGH: 0
ABDOMINAL DISTENTION: 0
BLOOD IN STOOL: 0
ABDOMINAL PAIN: 0
CONSTIPATION: 0
SHORTNESS OF BREATH: 0
VOMITING: 0
ROS SKIN COMMENTS: DRY SKIN
CHEST TIGHTNESS: 0

## 2021-03-12 ASSESSMENT — PATIENT HEALTH QUESTIONNAIRE - PHQ9
SUM OF ALL RESPONSES TO PHQ QUESTIONS 1-9: 1
SUM OF ALL RESPONSES TO PHQ QUESTIONS 1-9: 1
2. FEELING DOWN, DEPRESSED OR HOPELESS: 1

## 2021-03-12 NOTE — PROGRESS NOTES
Hernán Gordon (:  1950) is a 69 y.o. female,Established patient, here for evaluation of the following chief complaint(s): Diabetes ([facility] [place] ), Hypertension (states her med list is the same did not bring list ), Congestive Heart Failure, and Hyperlipidemia      ASSESSMENT/PLAN:    1. Type 2 diabetes mellitus with stage 3 a chronic kidney disease, without long-term current use of insulin ([facility])  Improving diabetes  Improving CKD stage 3, GFR 51, was 43 before    Lab Results   Component Value Date    LABA1C 5.9 2021    LABA1C 6.6 (H) 10/27/2020    LABA1C 8.3 (H) 2020       -     POCT glycosylated hemoglobin (Hb A1C)  -     [facility] Diabetes [facility] [address] / Creatinine Urine Ratio; Future  -     Magnesium; Future  -     Phosphorus; Future  -     TSH without Reflex; Future  -     Vitamin B12 & Folate; Future  -     Comprehensive Metabolic Panel; Future  -     CBC Auto Differential; Future  -advised home blood glucose testing  daily  -daily feet exam, Foot care: advised to wash feet daily, pat dry and apply lotion at night, avoiding between toes. Need to look at feet daily and report to a physician any signs of inflammation or skin damage  -annual dilated eye exam  -Low carb, low fat diet, increase fruits and vegetables, and exercise 4-5 times a day  -continue statin   I discussed with her that she can take Metformin only when eating too many sweets instead      2. Chronic diastolic CHF (congestive heart failure) ([facility])  Stable  Lab Results   Component Value Date    LVEF 48 2020   Continue current treatment: Metoprolol HCTZ and aldactone  Recheck labs  -     hydroCHLOROthiazide (HYDRODIURIL) 25 MG tablet; Take 1 tablet by mouth every morning (before breakfast) Water pill, Disp-30 tablet, R-0Normal  -     spironolactone (ALDACTONE) 25 MG tablet;  Take 1 tablet by mouth every morning (before breakfast) Water pill, Disp-90 tablet, R-3Normal  -     Brain Natriuretic Peptide; Future  -     [address]    3. HTN. Benign hypertension with CKD (chronic kidney disease) stage III (HCC)  Improved Chronic kidney disease stage 3  Well controlled HTN. Continue current treatment. Will recheck labs. -     hydroCHLOROthiazide (HYDRODIURIL) 25 MG tablet; Take 1 tablet by mouth every morning (before breakfast) Water pill, Disp-30 tablet, R-0Normal  -     spironolactone (ALDACTONE) 25 MG tablet; Take 1 tablet by mouth every morning (before breakfast) Water pill, Disp-90 tablet, R-3Normal  -     Magnesium; Future  -     Phosphorus; Future  -     Urinalysis Reflex to Culture; Future  -     Comprehensive Metabolic Panel; Future  -     CBC Auto Differential; Future  Discussed low salt diet and BP and pulse monitoring daily    4. Paroxysmal atrial fibrillation (HCC)  Stable  Continue metoprolol for rate control, and apixaban for chronic anticoagulation   5. Hyperlipidemia with target LDL less than 70  Well controlled. Continue current treatment. lipitor  Will recheck labs. Lab Results   Component Value Date    LDLCHOLESTEROL 47 07/27/2020       -     atorvastatin (LIPITOR) 40 MG tablet; Take 1 tablet by mouth daily, Disp-90 tablet, R-3Normal  6. Chronic obstructive pulmonary disease, unspecified COPD type ([facility] [address]  Stable  Will continue to monitor.  - refilled    albuterol sulfate HFA (PROAIR HFA) 108 (90 Base) MCG/ACT inhaler; Inhale 2 puffs into the lungs every 6 hours as needed for Wheezing or Shortness of Breath (cough), Disp-8 g, R-3Normal  7. Vitamin D deficiency  Unsure if improving or not. Will recheck level  Continue supplementation.  -     Vitamin D 25 Hydroxy;  Future      Hospital follow-up visit was already done by my partner on 2/25/2021    Aggie Swanor received counseling on the following healthy behaviors: nutrition, exercise, medication adherence and weight loss  Reviewed prior labs and health maintenance  Discussed use, benefit, and side effects of prescribed medications. Barriers to medication compliance addressed. Patient given educational materials - see patient instructions  Was a self-tracking handout given in paper form or via [employer]? Yes  All patient questions answered. Patient voiced understanding. The patient's past medical,surgical, social, and family history as well as her current medications and allergies were reviewed as documented in today's encounter. Medications, labs, diagnostic studies, consultations and follow-up as documented in this encounter. Return in 1 month (on 4/12/2021) for MEDICARE, VISION, PHQ9, MINICOG, HRA QUESTIONS. Data Unavailable      SUBJECTIVE/OBJECTIVE:      Comes with her daughter, Fatemeh Echevarria  Patient is very hard of hearing, her daughter has to repeat often what I say. Ambulates with walker  Her daughter says patient doesn't want home care, she wants to go to places due to COVID-19 and not to have people coming to her home. Diabetes Mellitus Type II, Follow-up:    Current symptoms/problems include hyperglycemia, paresthesia of the feet and visual disturbances. Symptoms have been present for several years. Medication compliance:  compliant all of the time  Current diabetic medications include oral agent (monotherapy): Glucophage. Patient says Metformin was stopped while admitted due to her kidney function. I discussed with her that she can take Metformin only when eating too many sweets instead    Eye exam current (within one year): no, advised her to make an appointment with ophthalmologist   Current diet: in general, a \"healthy\" diet      Barriers: impairment:  hearing and physical ambulate with walker, multiple comorbidities, age    Current monitoring regimen: home blood tests - daily  Home blood sugar records: fasting range: Low 100s 113, 107  Any episodes of hypoglycemia? no    Is She on ACE inhibitor or angiotensin II receptor blocker?    No      reports that she quit smoking about 21 years ago. She has a 2.50 pack-year smoking history. She has never used smokeless tobacco.     Counseling given: Yes      Daily Aspirin? No      A1c is improving . Lab Results   Component Value Date    LABA1C 5.9 03/12/2021    LABA1C 6.6 (H) 10/27/2020    LABA1C 8.3 (H) 07/27/2020       Urine microabumin is Elevated. Lab Results   Component Value Date    EADVEJR 706 (H) 02/27/2019          Hypertension, CHF, Paroxismal AFib      Danny Trinidad  is not  exercising and is adherent to low salt diet. Blood pressure is well controlled at home. Cardiac symptoms  fatigue and lower extremity edema. Patient denies  chest pain, chest pressure/discomfort, claudication, dyspnea, exertional chest pressure/discomfort, irregular heart beat, near-syncope, orthopnea, palpitations, paroxysmal nocturnal dyspnea, syncope and tachypnea. Use of agents associated with hypertension: none. History of target organ damage: chronic kidney disease, heart failure, stroke and Coronary artery disease, she does have stents. Patient's daughter says she did see cardiology about 6 months ago  She had new onset atrial fibrillation in July 2020 when she was admitted at [facility], she is on chronic anticoagulation for it       BP controlled. Danny Trinidad reports compliance with BP medications, and tolerates them well, denies side effects. BP Readings from Last 3 Encounters:   03/12/21 120/80   02/20/21 (!) 179/81   10/01/20 100/60        Pulse is Normal.    Pulse Readings from Last 3 Encounters:   03/12/21 82   02/20/21 81   10/01/20 68     Lab Results   Component Value Date    LVEF 48 07/30/2020         Hyperlipidemia:  No new myalgias or GI upset on atorvastatin (Lipitor). Medication compliance: compliant all of the time. Patient is  following a low fat, low cholesterol diet. LDL is Normal.    Lab Results   Component Value Date    LDLCHOLESTEROL 47 07/27/2020       COPD:  Current treatment includes nothing.  Ran out of inhalers FREESTYLE LANCETS MISC 1 each by Does not apply route daily Patient needs to contact office before any further refills will be approved Yes Sean Swanson MD   blood glucose test strips (FREESTYLE LITE) strip 1 each by In Vitro route daily As needed. Yes Sean Swanson MD   blood glucose monitor kit and supplies 1 kit by Other route three times daily Dispense Butterfly Elite CBG Device Yes Indra Juárez MD            Social History     Tobacco Use    Smoking status: Former Smoker     Packs/day: 0.25     Years: 10.00     Pack years: 2.50     Quit date: 2000     Years since quittin.2    Smokeless tobacco: Never Used   Substance Use Topics    Alcohol use: Yes     Alcohol/week: 0.0 standard drinks    Drug use: No         Review of Systems   Constitutional: Positive for fatigue and unexpected weight change. Negative for activity change, appetite change, chills, diaphoresis and fever. HENT: Positive for hearing loss. Eyes: Positive for visual disturbance (stable, chronic). Respiratory: Negative for cough, chest tightness, shortness of breath and wheezing. Cardiovascular: Positive for leg swelling. Negative for chest pain and palpitations. Gastrointestinal: Positive for diarrhea (on and off, chronic, declines colonoscopy). Negative for abdominal distention, abdominal pain, blood in stool, constipation, nausea and vomiting. Endocrine: Negative for cold intolerance, heat intolerance, polydipsia, polyphagia and polyuria. Musculoskeletal: Positive for back pain. Ambulates with walker   Skin: Positive for rash (legs). Dry skin   Neurological: Positive for numbness (feet and legs). Hematological: Does not bruise/bleed easily. Psychiatric/Behavioral: Positive for dysphoric mood. Negative for self-injury, sleep disturbance and suicidal ideas. The patient is nervous/anxious.          -vital signs stable and within normal limits except Overweight per BMI.    /80   Pulse 82   Temp 97.6 °F (36.4 °C)   Ht 4' 11\" (1.499 m)   Wt 137 lb (62.1 kg)   SpO2 98%   BMI 27.67 kg/m²     fluctuating WT  Wt Readings from Last 3 Encounters:   21 137 lb (62.1 kg)   21 144 lb 2.9 oz (65.4 kg)   10/01/20 140 lb (63.5 kg)         Physical Exam  Vitals signs and nursing note reviewed. Constitutional:       General: She is not in acute distress. Appearance: Normal appearance. She is well-developed. She is not diaphoretic. HENT:      Head: Normocephalic and atraumatic. Right Ear: External ear normal. Decreased hearing noted. Left Ear: External ear normal. Decreased hearing noted. Nose: No mucosal edema. Mouth/Throat:      Comments: I did not examine the mouth due to coronavirus pandemic and wearing masks    Eyes:      General: Lids are normal. No scleral icterus. Right eye: No discharge. Left eye: No discharge. Extraocular Movements: Extraocular movements intact. Conjunctiva/sclera: Conjunctivae normal.   Neck:      Musculoskeletal: Normal range of motion and neck supple. Thyroid: No thyromegaly. Cardiovascular:      Rate and Rhythm: Normal rate and regular rhythm. Heart sounds: Normal heart sounds. No murmur. Pulmonary:      Effort: Pulmonary effort is normal. No respiratory distress. Breath sounds: Normal breath sounds. No wheezing or rales. Chest:      Chest wall: No tenderness. Abdominal:      General: Bowel sounds are normal. There is no distension. Palpations: Abdomen is soft. There is no hepatomegaly or splenomegaly. Tenderness: There is no abdominal tenderness. Musculoskeletal: Normal range of motion. General: Deformity present. No tenderness. Right lower le+ Pitting Edema present. Left lower le+ Pitting Edema present. Comments: Thoracic kyphosis   Skin:     General: Skin is warm and dry. Capillary Refill: Capillary refill takes less than 2 seconds. Findings: Rash present. Comments: erythematous rash stasis dermatitis    Neurological:      Mental Status: She is alert and oriented to person, place, and time. Cranial Nerves: No cranial nerve deficit. Motor: No abnormal muscle tone. Gait: Gait abnormal.      Comments: Ambulates with walker   Psychiatric:         Mood and Affect: Mood is anxious. Behavior: Behavior normal.         Thought Content: Thought content normal.         Judgment: Judgment normal.           I personally reviewed testing with patient.   Chronic kidney disease stage III improved  Hyperglycemia controlled  Increased LFTs  Vitamin D deficiency  Otherwise labs within normal limits        Lab Results   Component Value Date    WBC 7.6 02/20/2021    HGB 12.8 02/20/2021    HCT 39.0 02/20/2021    MCV 93.6 02/20/2021     02/20/2021       Lab Results   Component Value Date     02/20/2021    K 4.1 02/20/2021     02/20/2021    CO2 25 02/20/2021    BUN 49 02/20/2021    CREATININE 1.07 02/20/2021    GLUCOSE 128 02/20/2021    CALCIUM 9.3 02/20/2021        Lab Results   Component Value Date    ALT 23 10/27/2020    AST 35 (H) 10/27/2020    ALKPHOS 109 (H) 10/27/2020    BILITOT 0.37 10/27/2020       Lab Results   Component Value Date    TSH 1.39 07/27/2020       Lab Results   Component Value Date    CHOL 117 07/27/2020    CHOL 188 03/11/2020    CHOL 159 02/27/2019     Lab Results   Component Value Date    TRIG 112 07/27/2020    TRIG 279 (H) 03/11/2020    TRIG 214 (H) 02/27/2019     Lab Results   Component Value Date    HDL 48 07/27/2020    HDL 55 03/11/2020    HDL 48 02/27/2019     Lab Results   Component Value Date    LDLCHOLESTEROL 47 07/27/2020    LDLCHOLESTEROL 77 03/11/2020    LDLCHOLESTEROL 68 02/27/2019     Lab Results   Component Value Date    CHOLHDLRATIO 2.4 07/27/2020    CHOLHDLRATIO 3.4 03/11/2020    CHOLHDLRATIO 3.3 02/27/2019         Lab Results   Component Value Date    SUYONPEY35 1205 03/11/2020 Lab Results   Component Value Date    FOLATE >20.0 03/11/2020     Lab Results   Component Value Date    VITD25 18.1 (L) 09/25/2020         Orders Placed This Encounter   Medications    atorvastatin (LIPITOR) 40 MG tablet     Sig: Take 1 tablet by mouth daily     Dispense:  90 tablet     Refill:  3    hydroCHLOROthiazide (HYDRODIURIL) 25 MG tablet     Sig: Take 1 tablet by mouth every morning (before breakfast) Water pill     Dispense:  30 tablet     Refill:  0    spironolactone (ALDACTONE) 25 MG tablet     Sig: Take 1 tablet by mouth every morning (before breakfast) Water pill     Dispense:  90 tablet     Refill:  3    albuterol sulfate HFA (PROAIR HFA) 108 (90 Base) MCG/ACT inhaler     Sig: Inhale 2 puffs into the lungs every 6 hours as needed for Wheezing or Shortness of Breath (cough)     Dispense:  8 g     Refill:  3       Orders Placed This Encounter   Procedures    Microalbumin / Creatinine Urine Ratio     Standing Status:   Future     Standing Expiration Date:   3/13/2022    Brain Natriuretic Peptide     Standing Status:   Future     Standing Expiration Date:   3/12/2022    Magnesium     Standing Status:   Future     Standing Expiration Date:   3/12/2022    Phosphorus     Standing Status:   Future     Standing Expiration Date:   3/12/2022    TSH without Reflex     Standing Status:   Future     Standing Expiration Date:   3/12/2022    Vitamin D 25 Hydroxy     Standing Status:   Future     Standing Expiration Date:   3/12/2022    Vitamin B12 & Folate     Standing Status:   Future     Standing Expiration Date:   3/12/2022    Urinalysis Reflex to Culture     Standing Status:   Future     Standing Expiration Date:   3/12/2022     Order Specific Question:   SPECIFY(EX-CATH,MIDSTREAM,CYSTO,ETC)?      Answer:   midstream    Comprehensive Metabolic Panel     Standing Status:   Future     Standing Expiration Date:   3/12/2022    CBC Auto Differential     Standing Status:   Future     Standing Expiration Date:   3/12/2022   [address] [facility]     Referral Priority:   Routine     Referral Type:   Eval and Treat     Referral Reason:   Specialty Services Required     Number of Visits Requested:   [address]     Referral Priority:   Routine     Referral Type:   Eval and Treat     Referral Reason:   Specialty Services Required     Number of Visits Requested:   1    POCT glycosylated hemoglobin (Hb A1C)    [place] DISCHARGE MEDS RECONCILED W/ CURRENT OUTPATIENT MED LIST       Medications Discontinued During This Encounter   Medication Reason    hydroCHLOROthiazide (HYDRODIURIL) 25 MG tablet REORDER    spironolactone (ALDACTONE) 25 MG tablet REORDER    atorvastatin (LIPITOR) 40 MG tablet REORDER         On this date 3/12/2021 I have spent 39 minutes reviewing previous notes, test results and face to face with the patient discussing the diagnosis and importance of compliance with the treatment plan as well as documenting on the day of the visit and care coordination with her doctor. Future Appointments   Date Time Provider Angela Reyez   3/24/2021  3:40 PM Carole Simmons MD Neuro Spec [facility]   5/12/2021  2:00 PM Deshawn Mir MD fp sc [facility]       This note was completed by using the assistance of a speech-recognition program. However, inadvertent computerized transcription errors may be present. Although every effort was made to ensure accuracy, no guarantees can be provided that every mistake has been identified and corrected by editing . An electronic signature was used to authenticate this note.   Electronically signed by Deshawn Mir MD on 3/14/2021 at 5:11 PM

## 2021-03-12 NOTE — PATIENT INSTRUCTIONS
[id number] Ioana Lima not to take metformin if not eating sweets    ================  Patient Education        Low Sodium Diet (2,000 Milligram): Care Instructions  Overview     Limiting sodium can be an important part of managing some health problems. The most common source of sodium is salt. People get most of the salt in their diet from canned, prepared, and packaged foods. Fast food and restaurant meals also are very high in sodium. Your doctor will probably limit your sodium to less than 2,000 milligrams (mg) a day. This limit counts all the sodium in prepared and packaged foods and any salt you add to your food. Follow-up care is a key part of your treatment and safety. Be sure to make and go to all appointments, and call your doctor if you are having problems. It's also a good idea to know your test results and keep a list of the medicines you take. How can you care for yourself at home? Read food labels  · Read labels on cans and food packages. The labels tell you how much sodium is in each serving. Make sure that you look at the serving size. If you eat more than the serving size, you have eaten more sodium. · Food labels also tell you the Percent Daily Value for sodium. Choose products with low Percent Daily Values for sodium. · Be aware that sodium can come in forms other than salt, including monosodium glutamate (MSG), sodium citrate, and sodium bicarbonate (baking soda). MSG is often added to Asian food. When you eat out, you can sometimes ask for food without MSG or added salt. Buy low-sodium foods  · Buy foods that are labeled \"unsalted\" (no salt added), \"sodium-free\" (less than 5 mg of sodium per serving), or \"low-sodium\" (140 mg or less of sodium per serving). Foods labeled \"reduced-sodium\" and \"light sodium\" may still have too much sodium. Be sure to read the label to see how much sodium you are getting. · Buy fresh vegetables, or frozen vegetables without added sauces.  Buy low-sodium versions of canned vegetables, soups, and other canned goods. Prepare low-sodium meals  · Cut back on the amount of salt you use in cooking. This will help you adjust to the taste. Do not add salt after cooking. One teaspoon of salt has about 2,300 mg of sodium. · Take the salt shaker off the table. · Flavor your food with garlic, lemon juice, onion, vinegar, herbs, and spices. Do not use soy sauce, lite soy sauce, steak sauce, onion salt, garlic salt, celery salt, or ketchup on your food. · Use low-sodium salad dressings, sauces, and ketchup. Or make your own salad dressings and sauces without adding salt. · Use less salt (or none) when recipes call for it. You can often use half the salt a recipe calls for without losing flavor. Other foods such as rice, pasta, and grains do not need added salt. · Rinse canned vegetables, and cook them in fresh water. This removes somebut not allof the salt. · Avoid water that is naturally high in sodium or that has been treated with water softeners, which add sodium. If you buy bottled water, read the label and choose a sodium-free brand. Avoid high-sodium foods  · Avoid eating:  ? Smoked, cured, salted, and canned meat, fish, and poultry. ? Ham, de león, hot dogs, and luncheon meats. ? Regular, hard, and processed cheese and regular peanut butter. ? Crackers with salted tops, and other salted snack foods such as pretzels, chips, and salted popcorn. ? Frozen prepared meals, unless labeled low-sodium. ? Canned and dried soups, broths, and bouillon, unless labeled sodium-free or low-sodium. ? Canned vegetables, unless labeled sodium-free or low-sodium. ? [place] fries, pizza, tacos, and other fast foods. ? Pickles, olives, ketchup, and other condiments, especially soy sauce, unless labeled sodium-free or low-sodium. Where can you learn more? Go to https://morro.iVengo. org and sign in to your [employer] account.  Enter H391 in the [place] box to learn more about \"Low Sodium Diet (2,000 Milligram): Care Instructions. \"     If you do not have an account, please click on the \"Sign Up Now\" link. Current as of: December 17, 2020               Content Version: 12.8  © 2006-2021 Healthwise, Incorporated. Care instructions adapted under license by [facility] ([facility]). If you have questions about a medical condition or this instruction, always ask your healthcare professional. [address] any warranty or liability for your use of this information. Patient Education        Learning About Carbohydrate (Carb) Counting and Eating Out When You Have Diabetes  Why plan your meals? Meal planning can be a key part of managing diabetes. Planning meals and snacks with the right balance of carbohydrate, protein, and fat can help you keep your blood sugar at the target level you set with your doctor. You don't have to eat special foods. You can eat what your family eats, including sweets once in a while. But you do have to pay attention to how often you eat and how much you eat of certain foods. You may want to work with a dietitian or a certified diabetes educator. He or she can give you tips and meal ideas and can answer your questions about meal planning. This health professional can also help you reach a healthy weight if that is one of your goals. What should you know about eating carbs? Managing the amount of carbohydrate (carbs) you eat is an important part of healthy meals when you have diabetes. Carbohydrate is found in many foods. · Learn which foods have carbs. And learn the amounts of carbs in different foods. ? Bread, cereal, pasta, and rice have about 15 grams of carbs in a serving. A serving is 1 slice of bread (1 ounce), ½ cup of cooked cereal, or 1/3 cup of cooked pasta or rice. ? Fruits have 15 grams of carbs in a serving.  A serving is 1 small fresh fruit, such as an apple or orange; ½ of a banana; ½ cup of cooked or canned fruit; ½ cup of fruit juice; 1 cup of melon or raspberries; or 2 tablespoons of dried fruit. ? Milk and no-sugar-added yogurt have 15 grams of carbs in a serving. A serving is 1 cup of milk or 2/3 cup of no-sugar-added yogurt. ? Starchy vegetables have 15 grams of carbs in a serving. A serving is ½ cup of mashed potatoes or sweet potato; 1 cup winter squash; ½ of a small baked potato; ½ cup of cooked beans; or ½ cup cooked corn or green peas. · Learn how much carbs to eat each day and at each meal. A dietitian or CDE can teach you how to keep track of the amount of carbs you eat. This is called carbohydrate counting. · If you are not sure how to count carbohydrate grams, use the Plate Method to plan meals. It is a good, quick way to make sure that you have a balanced meal. It also helps you spread carbs throughout the day. ? Divide your plate by types of foods. Put non-starchy vegetables on half the plate, meat or other protein food on one-quarter of the plate, and a grain or starchy vegetable in the final quarter of the plate. To this you can add a small piece of fruit and 1 cup of milk or yogurt, depending on how many carbs you are supposed to eat at a meal.  · Try to eat about the same amount of carbs at each meal. Do not \"save up\" your daily allowance of carbs to eat at one meal.  · Proteins have very little or no carbs per serving. Examples of proteins are beef, chicken, turkey, fish, eggs, tofu, cheese, cottage cheese, and peanut butter. A serving size of meat is 3 ounces, which is about the size of a deck of cards. Examples of meat substitute serving sizes (equal to 1 ounce of meat) are 1/4 cup of cottage cheese, 1 egg, 1 tablespoon of peanut butter, and ½ cup of tofu. How can you eat out and still eat healthy? · Learn to estimate the serving sizes of foods that have carbohydrate. If you measure food at home, it will be easier to estimate the amount in a serving of restaurant food.   · If the meal you order has too much carbohydrate (such as potatoes, corn, or baked beans), ask to have a low-carbohydrate food instead. Ask for a salad or green vegetables. · If you use insulin, check your blood sugar before and after eating out to help you plan how much to eat in the future. · If you eat more carbohydrate at a meal than you had planned, take a walk or do other exercise. This will help lower your blood sugar. What are some tips for eating healthy? · Limit saturated fat, such as the fat from meat and dairy products. This is a healthy choice because people who have diabetes are at higher risk of heart disease. So choose lean cuts of meat and nonfat or low-fat dairy products. Use olive or canola oil instead of butter or shortening when cooking. · Don't skip meals. Your blood sugar may drop too low if you skip meals and take insulin or certain medicines for diabetes. · Check with your doctor before you drink alcohol. Alcohol can cause your blood sugar to drop too low. Alcohol can also cause a bad reaction if you take certain diabetes medicines. Follow-up care is a key part of your treatment and safety. Be sure to make and go to all appointments, and call your doctor if you are having problems. It's also a good idea to know your test results and keep a list of the medicines you take. Where can you learn more? Go to https://Tradyokeri.[employer]. org and sign in to your K[employer] account. Enter F887 in the [facility] box to learn more about \"Learning About Carbohydrate (Carb) Counting and Eating Out When You Have Diabetes. \"     If you do not have an account, please click on the \"Sign Up Now\" link. Current as of: August 31, 2020               Content Version: 12.8  © 7732-8133 Healthwise, Incorporated. Care instructions adapted under license by [facility] ([facility]).  If you have questions about a medical condition or this instruction, always ask your healthcare professional. [address] any warranty or liability for your use of this information.

## 2021-03-12 NOTE — PROGRESS NOTES
Visit Information    Have you changed or started any medications since your last visit including any over-the-counter medicines, vitamins, or herbal medicines? no   Are you having any side effects from any of your medications? -  no  Have you stopped taking any of your medications? Is so, why? -  no    Have you seen any other physician or provider since your last visit? No  Have you had any other diagnostic tests since your last visit? Yes - Records Obtained  Have you been seen in the emergency room and/or had an admission to a hospital since we last saw you? Yes - Records Obtained  Have you had your routine dental cleaning in the past 6 months? no    Have you activated your [employer] account? If not, what are your barriers?  Yes     Patient Care Team:  Noe Holloway MD as PCP - General (Family Medicine)  Noe Holloway MD as PCP - [facility]  Emanuel Rivas MD as Consulting Physician (Rheumatology)  Floresita Abarca MD as Surgeon (Cardiology)  See Medeiros MD as Consulting Physician (Nephrology)  Eleanor Nickerson MD as Consulting Physician (Neurology)    Medical History Review  Past Medical, Family, and Social History reviewed and does contribute to the patient presenting condition    Health Maintenance   Topic Date Due    COVID-19 Vaccine (1) Never done    DTaP/Tdap/Td vaccine (1 - Tdap) Never done    Shingles Vaccine (1 of 2) Never done    Pneumococcal 65+ years Vaccine (1 of 1 - PPSV23) Never done    Colon Cancer Screen FIT/FOBT  03/29/2018    Diabetic foot exam  10/01/2019    Diabetic microalbuminuria test  02/27/2020    Flu vaccine (1) 10/04/2021 (Originally 9/1/2020)    Annual Wellness Visit (AWV)  01/10/2022 (Originally 5/29/2019)    Lipid screen  07/27/2021    Diabetic retinal exam  10/19/2021    A1C test (Diabetic or Prediabetic)  10/27/2021    Potassium monitoring  02/20/2022    Creatinine monitoring  02/20/2022    Breast cancer screen  08/28/2022    DEXA (modify frequency per FRAX score)  Completed    Hepatitis C screen  Completed    Hepatitis A vaccine  Aged Out    Hib vaccine  Aged Out    Meningococcal (ACWY) vaccine  Aged Out

## 2021-03-12 NOTE — RESULT ENCOUNTER NOTE
Addressed during office visit today, A1c 5.9, improved diabetes, abnormal, continue treatment recommended during the office visit

## 2021-03-12 NOTE — PROGRESS NOTES
Post-Discharge Transitional Care Management Services or Hospital Follow Up      [address] [place] [address] Drive   YOB: 1950    Date of Office Visit:  3/12/2021  Date of Hospital Admission: 2/17/21  Date of Hospital Discharge: 2/20/21  Readmission Risk Score(high >=14%. Medium >=10%):Readmission Risk Score: 18      Care management risk score Rising risk (score 2-5) and Complex Care (Scores >=6): 4     Non face to face  following discharge, date last encounter closed (first attempt may have been earlier): *No documented post hospital discharge outreach found in the last 14 days *No documented post hospital discharge outreach found in the last 14 days    Call initiated 2 business days of discharge: *No response recorded in the last 14 days     Patient Active Problem List   Diagnosis    Vitamin D deficiency    Venous insufficiency of both lower extremities    Type 2 diabetes mellitus with stage 3 chronic kidney disease, without long-term current use of insulin ([facility]    Coronary artery disease involving native coronary artery of native heart without angina pectoris    Iron deficiency anemia    Stage 3 chronic kidney disease    Colonoscopy refused    Pneumococcal vaccination declined    Impaired hearing    Chronic diastolic CHF (congestive heart failure) ([facility]    Chronic obstructive pulmonary disease ([facility]    HTN.  Benign hypertension with CKD (chronic kidney disease) stage III (HCC)    Gout with tophi    Hyperlipidemia with target LDL less than 70    Dry skin dermatitis HANDS    Essential hypertension    Toxic metabolic encephalopathy    Meningioma, cerebral (HCC)    Paroxysmal atrial fibrillation (HCC)    Head contusion    Influenza vaccination declined    Cellulitis of lower extremity    UTI (urinary tract infection)    Hallucinations    Meningioma (HCC)    Elevated serum creatinine       Allergies   Allergen Reactions    Aleve [Naproxen Sodium]      Chronic kidney disease stage III, CHF  Pioglitazone Other (See Comments)     Congestive heart failure    Claritin [Loratadine]     Keflex [Cephalexin]     Lisinopril      Needs clarification of contraindication       Medications listed as ordered at the time of discharge from hospital   [place], [facility] Medication Instructions TREVOR:    Printed on:03/12/21 2258   Medication Information                      allopurinol (ZYLOPRIM) 300 MG tablet  Take 1 tablet by mouth daily Per rheumatologist             apixaban (ELIQUIS) 5 MG TABS tablet  Take 1 tablet by mouth 2 times daily             atorvastatin (LIPITOR) 40 MG tablet  Take 1 tablet by mouth daily             blood glucose monitor kit and supplies  1 kit by Other route three times daily Dispense Butterfly Elite CBG Device             blood glucose test strips (FREESTYLE LITE) strip  1 each by In Vitro route daily As needed.              Blood Pressure KIT  1 kit by Does not apply route three times daily             clopidogrel (PLAVIX) 75 MG tablet  Take 1 tablet by mouth daily             desloratadine (CLARINEX) 5 MG tablet  Take 1 tablet by mouth daily             FREESTYLE LANCETS MISC  1 each by Does not apply route daily Patient needs to contact office before any further refills will be approved             hydroCHLOROthiazide (HYDRODIURIL) 25 MG tablet  Take 1 tablet by mouth daily             metFORMIN (GLUCOPHAGE) 500 MG tablet  Take 1 tablet by mouth 2 times daily (with meals)             metoprolol tartrate (LOPRESSOR) 50 MG tablet  Take 1 tablet by mouth 2 times daily             Multiple Vitamins-Minerals (THERAPEUTIC MULTIVITAMIN-MINERALS) tablet  Take 1 tablet by mouth daily             spironolactone (ALDACTONE) 25 MG tablet  Take 1 tablet by mouth daily             vitamin D (ERGOCALCIFEROL) 1.25 MG (31992 UT) CAPS capsule  Take 1 capsule by mouth once a week                   Medications marked \"taking\" at this time  Outpatient Medications Marked as Taking for the 3/12/21 encounter (Office Visit) with Bobby Huizar MD   Medication Sig Dispense Refill    atorvastatin (LIPITOR) 40 MG tablet Take 1 tablet by mouth daily 90 tablet 3    hydroCHLOROthiazide (HYDRODIURIL) 25 MG tablet Take 1 tablet by mouth daily 30 tablet 0    spironolactone (ALDACTONE) 25 MG tablet Take 1 tablet by mouth daily 30 tablet 0    clopidogrel (PLAVIX) 75 MG tablet Take 1 tablet by mouth daily 90 tablet 3    desloratadine (CLARINEX) 5 MG tablet Take 1 tablet by mouth daily 90 tablet 3    metFORMIN (GLUCOPHAGE) 500 MG tablet Take 1 tablet by mouth 2 times daily (with meals) 180 tablet 3    vitamin D (ERGOCALCIFEROL) 1.25 MG (27472 UT) CAPS capsule Take 1 capsule by mouth once a week 24 capsule 0    apixaban (ELIQUIS) 5 MG TABS tablet Take 1 tablet by mouth 2 times daily 60 tablet 11    metoprolol tartrate (LOPRESSOR) 50 MG tablet Take 1 tablet by mouth 2 times daily 180 tablet 3    Multiple Vitamins-Minerals (THERAPEUTIC MULTIVITAMIN-MINERALS) tablet Take 1 tablet by mouth daily      allopurinol (ZYLOPRIM) 300 MG tablet Take 1 tablet by mouth daily Per rheumatologist 90 tablet 0    Blood Pressure KIT 1 kit by Does not apply route three times daily 1 kit 0    FREESTYLE LANCETS MISC 1 each by Does not apply route daily Patient needs to contact office before any further refills will be approved 90 each 3    blood glucose test strips (FREESTYLE LITE) strip 1 each by In Vitro route daily As needed. 100 each 3    blood glucose monitor kit and supplies 1 kit by Other route three times daily Dispense Butterfly Elite CBG Device 1 kit 0        Medications patient taking as of now reconciled against medications ordered at time of hospital discharge: {YES / GS:19647}    Chief Complaint   Patient presents with    Follow-Up from [address] [address] [facility] [address] Other     states her med list is the same did not bring list        HPI    Inpatient course: Discharge summary reviewed- see chart.     Interval history/Current status: ***    Review of Systems    Vitals:    03/12/21 1414   BP: 120/80   Pulse: 82   Temp: 97.6 °F (36.4 °C)   SpO2: 98%   Weight: 137 lb (62.1 kg)   Height: 4' 11\" (1.499 m)     Body mass index is 27.67 kg/m². Wt Readings from Last 3 Encounters:   03/12/21 137 lb (62.1 kg)   02/20/21 144 lb 2.9 oz (65.4 kg)   10/01/20 140 lb (63.5 kg)     BP Readings from Last 3 Encounters:   03/12/21 120/80   02/20/21 (!) 179/81   10/01/20 100/60       Physical Exam        Assessment/Plan:  1.  Type 2 diabetes mellitus with stage 3 chronic kidney disease, without long-term current use of insulin, unspecified whether stage 3a or 3b CKD (HCC)  ***  - POCT glycosylated hemoglobin (Hb A1C)  - OR DISCHARGE MEDS RECONCILED W/ CURRENT OUTPATIENT MED LIST        Medical Decision Making: {FPABG1:88630}

## 2021-03-14 PROBLEM — R44.3 HALLUCINATIONS: Status: RESOLVED | Noted: 2021-02-18 | Resolved: 2021-03-14

## 2021-03-14 PROBLEM — L03.119 CELLULITIS OF LOWER EXTREMITY: Status: RESOLVED | Noted: 2020-10-04 | Resolved: 2021-03-14

## 2021-03-14 PROBLEM — G92.8 TOXIC METABOLIC ENCEPHALOPATHY: Status: RESOLVED | Noted: 2020-07-26 | Resolved: 2021-03-14

## 2021-03-14 PROBLEM — I10 ESSENTIAL HYPERTENSION: Chronic | Status: RESOLVED | Noted: 2020-07-25 | Resolved: 2021-03-14

## 2021-03-14 PROBLEM — R79.89 ELEVATED SERUM CREATININE: Status: RESOLVED | Noted: 2021-02-25 | Resolved: 2021-03-14

## 2021-03-14 PROBLEM — S00.93XA HEAD CONTUSION: Status: RESOLVED | Noted: 2020-09-09 | Resolved: 2021-03-14

## 2021-03-14 PROBLEM — N39.0 UTI (URINARY TRACT INFECTION): Status: RESOLVED | Noted: 2021-02-18 | Resolved: 2021-03-14

## 2021-03-14 ASSESSMENT — ENCOUNTER SYMPTOMS: DIARRHEA: 1

## 2021-03-18 ENCOUNTER — HOSPITAL ENCOUNTER (OUTPATIENT)
Age: 71
Setting detail: SPECIMEN
Discharge: HOME | End: 2021-03-18
Payer: MEDICARE

## 2021-03-18 DIAGNOSIS — I12.9 BENIGN HYPERTENSION WITH CKD (CHRONIC KIDNEY DISEASE) STAGE III (HCC): ICD-10-CM

## 2021-03-18 DIAGNOSIS — E11.22 TYPE 2 DIABETES MELLITUS WITH STAGE 3 CHRONIC KIDNEY DISEASE, WITHOUT LONG-TERM CURRENT USE OF INSULIN (HCC): ICD-10-CM

## 2021-03-18 DIAGNOSIS — I25.10 CORONARY ARTERY DISEASE INVOLVING NATIVE CORONARY ARTERY OF NATIVE HEART WITHOUT ANGINA PECTORIS: ICD-10-CM

## 2021-03-18 DIAGNOSIS — E87.6 HYPOKALEMIA: ICD-10-CM

## 2021-03-18 DIAGNOSIS — N18.30 TYPE 2 DIABETES MELLITUS WITH STAGE 3 CHRONIC KIDNEY DISEASE, WITHOUT LONG-TERM CURRENT USE OF INSULIN (HCC): ICD-10-CM

## 2021-03-18 DIAGNOSIS — N18.30 BENIGN HYPERTENSION WITH CKD (CHRONIC KIDNEY DISEASE) STAGE III (HCC): ICD-10-CM

## 2021-03-18 DIAGNOSIS — I50.32 CHRONIC DIASTOLIC CHF (CONGESTIVE HEART FAILURE) (HCC): ICD-10-CM

## 2021-03-18 DIAGNOSIS — E11.8 TYPE 2 DIABETES MELLITUS WITH COMPLICATION (HCC): ICD-10-CM

## 2021-03-18 DIAGNOSIS — N39.0 URINARY TRACT INFECTION WITH HEMATURIA, SITE UNSPECIFIED: ICD-10-CM

## 2021-03-18 DIAGNOSIS — N18.31 TYPE 2 DIABETES MELLITUS WITH STAGE 3A CHRONIC KIDNEY DISEASE, WITHOUT LONG-TERM CURRENT USE OF INSULIN (HCC): ICD-10-CM

## 2021-03-18 DIAGNOSIS — M1A.9XX1 GOUT WITH TOPHI: ICD-10-CM

## 2021-03-18 DIAGNOSIS — R31.9 URINARY TRACT INFECTION WITH HEMATURIA, SITE UNSPECIFIED: ICD-10-CM

## 2021-03-18 DIAGNOSIS — N18.30 CKD (CHRONIC KIDNEY DISEASE), STAGE III (HCC): ICD-10-CM

## 2021-03-18 DIAGNOSIS — E55.9 VITAMIN D DEFICIENCY: ICD-10-CM

## 2021-03-18 DIAGNOSIS — E11.22 TYPE 2 DIABETES MELLITUS WITH STAGE 3A CHRONIC KIDNEY DISEASE, WITHOUT LONG-TERM CURRENT USE OF INSULIN (HCC): ICD-10-CM

## 2021-03-18 DIAGNOSIS — E83.39 HYPERPHOSPHATEMIA: ICD-10-CM

## 2021-03-18 LAB
-: NORMAL
ABSOLUTE EOS #: 0.19 K/UL (ref 0–0.44)
ABSOLUTE IMMATURE GRANULOCYTE: 0.06 K/UL (ref 0–0.3)
ABSOLUTE LYMPH #: 1.54 K/UL (ref 1.1–3.7)
ABSOLUTE MONO #: 0.58 K/UL (ref 0.1–1.2)
ALBUMIN SERPL-MCNC: 3.7 G/DL (ref 3.5–5.2)
ALBUMIN/GLOBULIN RATIO: 1.3 (ref 1–2.5)
ALP BLD-CCNC: 92 U/L (ref 35–104)
ALT SERPL-CCNC: 16 U/L (ref 5–33)
AMORPHOUS: NORMAL
ANION GAP SERPL CALCULATED.3IONS-SCNC: 13 MMOL/L (ref 9–17)
AST SERPL-CCNC: 22 U/L
BACTERIA: NORMAL
BASOPHILS # BLD: 1 % (ref 0–2)
BASOPHILS ABSOLUTE: 0.04 K/UL (ref 0–0.2)
BILIRUB SERPL-MCNC: 0.33 MG/DL (ref 0.3–1.2)
BILIRUBIN URINE: NEGATIVE
BNP INTERPRETATION: ABNORMAL
BUN BLDV-MCNC: 34 MG/DL (ref 8–23)
BUN/CREAT BLD: ABNORMAL (ref 9–20)
CALCIUM SERPL-MCNC: 9.4 MG/DL (ref 8.6–10.4)
CASTS UA: NORMAL /LPF (ref 0–8)
CHLORIDE BLD-SCNC: 109 MMOL/L (ref 98–107)
CO2: 20 MMOL/L (ref 20–31)
COLOR: YELLOW
COMMENT UA: ABNORMAL
CREAT SERPL-MCNC: 0.83 MG/DL (ref 0.5–0.9)
CREATININE URINE: 53 MG/DL (ref 28–217)
CRYSTALS, UA: NORMAL /HPF
DIFFERENTIAL TYPE: ABNORMAL
EOSINOPHILS RELATIVE PERCENT: 3 % (ref 1–4)
EPITHELIAL CELLS UA: NORMAL /HPF (ref 0–5)
FOLATE: >20 NG/ML
GFR AFRICAN AMERICAN: >60 ML/MIN
GFR NON-AFRICAN AMERICAN: >60 ML/MIN
GFR SERPL CREATININE-BSD FRML MDRD: ABNORMAL ML/MIN/{1.73_M2}
GFR SERPL CREATININE-BSD FRML MDRD: ABNORMAL ML/MIN/{1.73_M2}
GLUCOSE BLD-MCNC: 100 MG/DL (ref 70–99)
GLUCOSE URINE: NEGATIVE
HCT VFR BLD CALC: 41.8 % (ref 36.3–47.1)
HEMOGLOBIN: 12.5 G/DL (ref 11.9–15.1)
IMMATURE GRANULOCYTES: 1 %
KETONES, URINE: NEGATIVE
LEUKOCYTE ESTERASE, URINE: ABNORMAL
LYMPHOCYTES # BLD: 22 % (ref 24–43)
MAGNESIUM: 1.8 MG/DL (ref 1.6–2.6)
MCH RBC QN AUTO: 29.8 PG (ref 25.2–33.5)
MCHC RBC AUTO-ENTMCNC: 29.9 G/DL (ref 28.4–34.8)
MCV RBC AUTO: 99.8 FL (ref 82.6–102.9)
MICROALBUMIN/CREAT 24H UR: 4810 MG/L
MICROALBUMIN/CREAT UR-RTO: 9075 MCG/MG CREAT
MONOCYTES # BLD: 8 % (ref 3–12)
MUCUS: NORMAL
NITRITE, URINE: NEGATIVE
NRBC AUTOMATED: 0 PER 100 WBC
OTHER OBSERVATIONS UA: NORMAL
PDW BLD-RTO: 15.2 % (ref 11.8–14.4)
PH UA: 5.5 (ref 5–8)
PHOSPHORUS: 3.5 MG/DL (ref 2.6–4.5)
PLATELET # BLD: 194 K/UL (ref 138–453)
PLATELET ESTIMATE: ABNORMAL
PMV BLD AUTO: 11.1 FL (ref 8.1–13.5)
POTASSIUM SERPL-SCNC: 4.7 MMOL/L (ref 3.7–5.3)
PRO-BNP: 2180 PG/ML
PROTEIN UA: ABNORMAL
RBC # BLD: 4.19 M/UL (ref 3.95–5.11)
RBC # BLD: ABNORMAL 10*6/UL
RBC UA: NORMAL /HPF (ref 0–4)
RENAL EPITHELIAL, UA: NORMAL /HPF
SEG NEUTROPHILS: 65 % (ref 36–65)
SEGMENTED NEUTROPHILS ABSOLUTE COUNT: 4.66 K/UL (ref 1.5–8.1)
SODIUM BLD-SCNC: 142 MMOL/L (ref 135–144)
SPECIFIC GRAVITY UA: 1.02 (ref 1–1.03)
TOTAL PROTEIN: 6.5 G/DL (ref 6.4–8.3)
TRICHOMONAS: NORMAL
TSH SERPL DL<=0.05 MIU/L-ACNC: 2.08 MIU/L (ref 0.3–5)
TURBIDITY: CLEAR
URINE HGB: ABNORMAL
UROBILINOGEN, URINE: NORMAL
VITAMIN B-12: 717 PG/ML (ref 232–1245)
VITAMIN D 25-HYDROXY: 19 NG/ML (ref 30–100)
WBC # BLD: 7.1 K/UL (ref 3.5–11.3)
WBC # BLD: ABNORMAL 10*3/UL
WBC UA: NORMAL /HPF (ref 0–5)
YEAST: NORMAL

## 2021-03-18 RX ORDER — ALLOPURINOL 300 MG/1
300 TABLET ORAL DAILY
Qty: 90 TABLET | Refills: 3 | Status: SHIPPED | OUTPATIENT
Start: 2021-03-18 | End: 2021-03-19 | Stop reason: SDUPTHER

## 2021-03-18 RX ORDER — HYDROCHLOROTHIAZIDE 25 MG/1
25 TABLET ORAL
Qty: 90 TABLET | Refills: 3 | Status: SHIPPED | OUTPATIENT
Start: 2021-03-18 | End: 2021-07-07 | Stop reason: ALTCHOICE

## 2021-03-18 RX ORDER — LANCETS 28 GAUGE
1 EACH MISCELLANEOUS DAILY
Qty: 90 EACH | Refills: 3 | Status: SHIPPED | OUTPATIENT
Start: 2021-03-18 | End: 2021-03-19 | Stop reason: SDUPTHER

## 2021-03-18 RX ORDER — CLOPIDOGREL BISULFATE 75 MG/1
75 TABLET ORAL DAILY
Qty: 90 TABLET | Refills: 3 | Status: SHIPPED | OUTPATIENT
Start: 2021-03-18 | End: 2021-10-12 | Stop reason: SDUPTHER

## 2021-03-18 RX ORDER — SPIRONOLACTONE 25 MG/1
25 TABLET ORAL
Qty: 90 TABLET | Refills: 3 | Status: SHIPPED | OUTPATIENT
Start: 2021-03-18 | End: 2021-12-20

## 2021-03-18 RX ORDER — BLOOD-GLUCOSE METER
1 KIT MISCELLANEOUS DAILY
Qty: 100 EACH | Refills: 3 | Status: SHIPPED | OUTPATIENT
Start: 2021-03-18 | End: 2021-03-19 | Stop reason: SDUPTHER

## 2021-03-18 NOTE — TELEPHONE ENCOUNTER
Please Approve or Refuse. Send to Pharmacy per Pt's Request:        Daughter states she needs these medications sent to [facility] but also needs certain meds sent to [facility]. I advised her that it will be too confusing to send them to both places at the same time and it would be easier to send to [facility] for now if she would only need a month supply until the  ones get mailed. Please advise.       Next Visit Date:  5/12/2021   Last Visit Date: 3/12/2021    Hemoglobin A1C (%)   Date Value   03/12/2021 5.9   10/27/2020 6.6 (H)   07/27/2020 8.3 (H)             ( goal A1C is < 7)   BP Readings from Last 3 Encounters:   03/12/21 120/80   02/20/21 (!) 179/81   10/01/20 100/60          (goal 120/80)  BUN   Date Value Ref Range Status   02/20/2021 49 (H) 8 - 23 mg/dL Final     CREATININE   Date Value Ref Range Status   02/20/2021 1.07 (H) 0.50 - 0.90 mg/dL Final     Potassium   Date Value Ref Range Status   02/20/2021 4.1 3.7 - 5.3 mmol/L Final

## 2021-03-19 DIAGNOSIS — R31.9 HEMATURIA, UNSPECIFIED TYPE: Primary | ICD-10-CM

## 2021-03-19 DIAGNOSIS — N18.30 BENIGN HYPERTENSION WITH CKD (CHRONIC KIDNEY DISEASE) STAGE III (HCC): Primary | ICD-10-CM

## 2021-03-19 DIAGNOSIS — E11.8 TYPE 2 DIABETES MELLITUS WITH COMPLICATION (HCC): ICD-10-CM

## 2021-03-19 DIAGNOSIS — I12.9 BENIGN HYPERTENSION WITH CKD (CHRONIC KIDNEY DISEASE) STAGE III (HCC): Primary | ICD-10-CM

## 2021-03-19 DIAGNOSIS — M1A.9XX1 GOUT WITH TOPHI: ICD-10-CM

## 2021-03-19 LAB
CULTURE: NO GROWTH
Lab: NORMAL
SPECIMEN DESCRIPTION: NORMAL

## 2021-03-19 RX ORDER — ALLOPURINOL 300 MG/1
300 TABLET ORAL DAILY
Qty: 90 TABLET | Refills: 3 | Status: SHIPPED | OUTPATIENT
Start: 2021-03-19 | End: 2022-02-15 | Stop reason: SDUPTHER

## 2021-03-19 RX ORDER — BLOOD-GLUCOSE METER
1 KIT MISCELLANEOUS DAILY
Qty: 100 EACH | Refills: 3 | Status: SHIPPED | OUTPATIENT
Start: 2021-03-19

## 2021-03-19 RX ORDER — LANCETS 28 GAUGE
1 EACH MISCELLANEOUS DAILY
Qty: 90 EACH | Refills: 3 | Status: SHIPPED | OUTPATIENT
Start: 2021-03-19 | End: 2022-02-15 | Stop reason: SDUPTHER

## 2021-03-19 RX ORDER — DESLORATADINE 5 MG/1
5 TABLET ORAL DAILY
Qty: 90 TABLET | Refills: 3 | Status: SHIPPED | OUTPATIENT
Start: 2021-03-19 | End: 2021-11-10

## 2021-03-19 RX ORDER — ERGOCALCIFEROL 1.25 MG/1
50000 CAPSULE ORAL WEEKLY
Qty: 24 CAPSULE | Refills: 0 | Status: SHIPPED | OUTPATIENT
Start: 2021-03-19 | End: 2021-07-21 | Stop reason: SDUPTHER

## 2021-03-19 NOTE — TELEPHONE ENCOUNTER
Please Approve or Refuse. Send to Pharmacy per Pt's Request:      Daughter states it takes a month for [facility] to get these mailed out to patient and is requesting them to be refilled through them. Daughter states she did get the ones you refilled yesterday from [facility].         Next Visit Date:  5/12/2021   Last Visit Date: 3/12/2021    Hemoglobin A1C (%)   Date Value   03/12/2021 5.9   10/27/2020 6.6 (H)   07/27/2020 8.3 (H)             ( goal A1C is < 7)   BP Readings from Last 3 Encounters:   03/12/21 120/80   02/20/21 (!) 179/81   10/01/20 100/60          (goal 120/80)  BUN   Date Value Ref Range Status   03/18/2021 34 (H) 8 - 23 mg/dL Final     CREATININE   Date Value Ref Range Status   03/18/2021 0.83 0.50 - 0.90 mg/dL Final     Potassium   Date Value Ref Range Status   03/18/2021 4.7 3.7 - 5.3 mmol/L Final

## 2021-03-19 NOTE — RESULT ENCOUNTER NOTE
ABNORMAL. Please notify patient. Patient is retaining water, her congestive heart failure marker is increased, to take hydrochlorothiazide 2 tablets a day for 3 days  There is a lot of blood in the urine I would suggest referral to urology and I will place it please give her contact information for new referral  Chronic kidney disease improved, stay away from sodium, needs to make appointment with Dr. Jeff Wu, nephrology please give her contact information.   Vitamin D is low needs the high-dose vitamin D weekly take with food, I refill the high-dose vitamin D        Future Appointments  3/24/2021  3:40 PM    Annette Murrell MD Neuro Spec     [id number]  5/12/2021  2:00 PM    Grayson Serrano MD     fp [facility]          [facility]

## 2021-03-19 NOTE — RESULT ENCOUNTER NOTE
noted new referral to nephrology placed but she was seen September 2020 by Dr. Yoselin Castellanos theoretically should not need another one but I placed it per patient's request

## 2021-03-24 ENCOUNTER — OFFICE VISIT (OUTPATIENT)
Dept: NEUROLOGY | Age: 71
End: 2021-03-24
Payer: MEDICARE

## 2021-03-24 VITALS
TEMPERATURE: 98.1 F | HEART RATE: 76 BPM | HEIGHT: 59 IN | BODY MASS INDEX: 27.62 KG/M2 | DIASTOLIC BLOOD PRESSURE: 59 MMHG | SYSTOLIC BLOOD PRESSURE: 163 MMHG | WEIGHT: 137 LBS

## 2021-03-24 DIAGNOSIS — G92.8 TOXIC METABOLIC ENCEPHALOPATHY: Primary | ICD-10-CM

## 2021-03-24 DIAGNOSIS — R26.81 GAIT INSTABILITY: ICD-10-CM

## 2021-03-24 PROCEDURE — 4040F PNEUMOC VAC/ADMIN/RCVD: CPT | Performed by: PSYCHIATRY & NEUROLOGY

## 2021-03-24 PROCEDURE — 3017F COLORECTAL CA SCREEN DOC REV: CPT | Performed by: PSYCHIATRY & NEUROLOGY

## 2021-03-24 PROCEDURE — G8484 FLU IMMUNIZE NO ADMIN: HCPCS | Performed by: PSYCHIATRY & NEUROLOGY

## 2021-03-24 PROCEDURE — G8400 PT W/DXA NO RESULTS DOC: HCPCS | Performed by: PSYCHIATRY & NEUROLOGY

## 2021-03-24 PROCEDURE — 1123F ACP DISCUSS/DSCN MKR DOCD: CPT | Performed by: PSYCHIATRY & NEUROLOGY

## 2021-03-24 PROCEDURE — 1090F PRES/ABSN URINE INCON ASSESS: CPT | Performed by: PSYCHIATRY & NEUROLOGY

## 2021-03-24 PROCEDURE — 1036F TOBACCO NON-USER: CPT | Performed by: PSYCHIATRY & NEUROLOGY

## 2021-03-24 PROCEDURE — 99214 OFFICE O/P EST MOD 30 MIN: CPT | Performed by: PSYCHIATRY & NEUROLOGY

## 2021-03-24 PROCEDURE — G8417 CALC BMI ABV UP PARAM F/U: HCPCS | Performed by: PSYCHIATRY & NEUROLOGY

## 2021-03-24 PROCEDURE — G8427 DOCREV CUR MEDS BY ELIG CLIN: HCPCS | Performed by: PSYCHIATRY & NEUROLOGY

## 2021-03-24 NOTE — PROGRESS NOTES
Active problem patient was seen in consulation at [facility] n February toxic metabolic encephalopathy attributed to cellulitis in legs and UTI . Right middle cranial fossa meningioma . The condition is she is back home with no more confusion or hallucinations . She does need help from daughter dispensing her medications. She ambulates with walker with some imbalnace . She lives with her son . She presented to [facility] with visual hallucinations seeing clown driving up her front yard running over plant seeing people in car and yelling at car . She has been found to have cellulitis in bilateral lower extremities . MRI of Head with overall stable anterior right middle cranial fossa meningioma 2.3 cm in size with some pressure over anterior temporal lobe . Mild chronic periventricular small vessel ischemia with old right caudate lacune She does have history of atrial fibrillation on eliquisalso on plavix with CAD . There is no history of seizures or any witnessed seizure activity . Significant medications plavix 75 mg po qd , eliquis 5 mg po bid , lipitor 40 mg po qd . Testing CTA head and neck Right ICA 60 % stenosis . Left ICA 30 % stenosis . Left PCA 50 % stenosis, July 2020  . MRI of Head with right anterior middle cranial fossa 18 x 21 mm lesion consistent with meningioma, July 2020 . EEG mild diffuse slowing , July 2020. MRI of Head today with overall stable anterior right middle cranial fossa meningioma 2.3 cm in size with some pressure over anterior temporal lobe . Mild chronic periventricular small vessel ischemia with old right caudate lacune . EEG mild intermittent right hemispheric temporal slowing with sharp wave formation      Past Medical History:   Diagnosis Date    Acute CVA (cerebrovascular accident) ([facility] [address] 7/25/2020    Arthritis     Brain mass     Cellulitis and abscess     Cellulitis and abscess of unspecified site     Cellulitis of left lower extremity 7/26/2020    Cellulitis of lower extremity 10/4/2020    CHF (congestive heart failure) (HCC)     Chronic kidney disease, unspecified     Coronary atherosclerosis of native coronary artery     Essential hypertension 2020    Essential hypertension, malignant     Hallucinations 2021    Hard of hearing     Head contusion 2020    Hypokalemia 2020    Iron deficiency anemia, unspecified     [facility]ocardial infarction ([facility] [address]     Other and unspecified hyperlipidemia     Pure hypercholesterolemia     Toxic metabolic encephalopathy     Type II or unspecified type diabetes mellitus without mention of complication, not stated as uncontrolled     Unspecified asthma(493.90)     Unspecified venous (peripheral) insufficiency     Unspecified vitamin D deficiency     UTI (urinary tract infection) 2021       Past Surgical History:   Procedure Laterality Date    CORONARY ARTERY BYPASS GRAFT      x 3, Dr. Rimma Jimenez         Family History   Problem Relation Age of Onset    Diabetes Mother     Heart Disease Mother     Heart Disease Father     Diabetes Sister     High Blood Pressure Sister     Diabetes Maternal Grandmother        Social History     Socioeconomic History    Marital status:       Spouse name: None    Number of children: None    Years of education: None    Highest education level: None   Occupational History    None   Social Needs    Financial resource strain: None    Food insecurity     Worry: None     Inability: None    Transportation needs     Medical: None     Non-medical: None   Tobacco Use    Smoking status: Former Smoker     Packs/day: 0.25     Years: 10.00     Pack years: 2.50     Quit date: 2000     Years since quittin.2    Smokeless tobacco: Never Used   Substance and Sexual Activity    Alcohol use: Not Currently     Alcohol/week: 0.0 standard drinks    Drug use: No    Sexual activity: None   Lifestyle    Physical activity     Days per week: None     Minutes per session: None    Stress: None   Relationships    Social connections     Talks on phone: None     Gets together: None     Attends [employer] service: None     Active member of club or organization: None     Attends meetings of clubs or organizations: None     Relationship status: None    Intimate partner violence     Fear of current or ex partner: None     Emotionally abused: None     Physically abused: None     Forced sexual activity: None   Other Topics Concern    None   Social History Narrative    None       Current Outpatient Medications   Medication Sig Dispense Refill    vitamin D (ERGOCALCIFEROL) 1.25 MG (00670 UT) CAPS capsule Take 1 capsule by mouth once a week 24 capsule 0    blood glucose test strips (FREESTYLE LITE) strip 1 each by In Vitro route daily As needed.  100 each 3    FreeStyle Lancets MISC 1 each by Does not apply route daily Patient needs to contact office before any further refills will be approved 90 each 3    desloratadine (CLARINEX) 5 MG tablet Take 1 tablet by mouth daily 90 tablet 3    allopurinol (ZYLOPRIM) 300 MG tablet Take 1 tablet by mouth daily Per rheumatologist 90 tablet 3    clopidogrel (PLAVIX) 75 MG tablet Take 1 tablet by mouth daily 90 tablet 3    spironolactone (ALDACTONE) 25 MG tablet Take 1 tablet by mouth every morning (before breakfast) Water pill 90 tablet 3    hydroCHLOROthiazide (HYDRODIURIL) 25 MG tablet Take 1 tablet by mouth every morning (before breakfast) Water pill 90 tablet 3    atorvastatin (LIPITOR) 40 MG tablet Take 1 tablet by mouth daily 90 tablet 3    albuterol sulfate HFA (PROAIR HFA) 108 (90 Base) MCG/ACT inhaler Inhale 2 puffs into the lungs every 6 hours as needed for Wheezing or Shortness of Breath (cough) 8 g 3    metFORMIN (GLUCOPHAGE) 500 MG tablet Take 1 tablet by mouth 2 times daily (with meals) 180 tablet 3    apixaban (ELIQUIS) 5 MG TABS tablet Take 1 tablet by mouth 2 times daily 60 tablet 11    metoprolol tartrate (LOPRESSOR) 50 MG tablet Take 1 tablet by mouth 2 times daily 180 tablet 3    Multiple Vitamins-Minerals (THERAPEUTIC MULTIVITAMIN-MINERALS) tablet Take 1 tablet by mouth daily      Blood Pressure KIT 1 kit by Does not apply route three times daily 1 kit 0    blood glucose monitor kit and supplies 1 kit by Other route three times daily Dispense Butterfly Elite CBG Device 1 kit 0     No current facility-administered medications for this visit. Allergies   Allergen Reactions    Aleve [Naproxen Sodium]      Chronic kidney disease stage III, CHF    Pioglitazone Other (See Comments)     Congestive heart failure    Claritin [Loratadine]     Keflex [Cephalexin]     Lisinopril      Needs clarification of contraindication         Review of Systems     Vitals:    03/24/21 1628   BP: (!) 163/59   Pulse: 76   Temp:      weight: 137 lb (62.1 kg)      Review of Systems   Constitutional: Negative. HENT: Negative. Eyes: Negative. Respiratory: Negative. Cardiovascular: Negative. Gastrointestinal: Negative. Endocrine: Negative. Genitourinary: Negative. Musculoskeletal: Positive for gait problem. Skin: Negative. Allergic/Immunologic: Negative. Hematological: Negative. Psychiatric/Behavioral: Negative. Neurological Examination  Constitutional .General exam well groomed   Head/Ears /Nose/Throat: external ear . Normal exam  Neck and thyroid . Normal size. No bruits  Respiratory . Breathsounds clear bilaterally  Cardiovascular: Auscultation of heart with regular rate and rhythm  Musculoskeletal. Muscle bulk and tone normal                                                           Muscle strength 5/5 strength throughout                                                                                No dysmetria or dysdiadokinesis  No tremor   Normal fine motor  Gait imbalance   Orientation Alert and oriented x 3 . Wednesday March 24, 2021. President Yamile Quach . WORLD able to spell forwards and backwards . Serial 3 to 93   Attention and concentration normal  Short term memory normal  Language process and speech normal . No aphasia   Cranial nerve 2 normal acuety and visual fields  Cranial nerve 3, 4 and 6 . Extraocular muscles are intact . Pupils are equal and reactive   Cranial nerve 5 . Normal strength of masseter and temporalis . Intact corneal reflex. Normal facial sensation  Cranial nerve 7 normal exam   Cranial nerve 8. Grossly intact hearing   Cranial nerve 9 and 10. Symmetric palate elevation   Cranial nerve 11 , 5 out of 5 strength   Cranial Nerve 12 midline tongue . No atrophy  Sensation . Normal proprioception . Intact Vibration . Normal pinprick and light touch   Deep Tendon Reflexes normal  Plantar response flexor bilaterally      ASSESSMENT/PLAN      Diagnosis Orders   1. Toxic metabolic encephalopathy     2.  Gait instability     Metabolic encephalopathy has resolved to followup as needed     As above

## 2021-03-25 ASSESSMENT — ENCOUNTER SYMPTOMS
RESPIRATORY NEGATIVE: 1
ALLERGIC/IMMUNOLOGIC NEGATIVE: 1
GASTROINTESTINAL NEGATIVE: 1
EYES NEGATIVE: 1

## 2021-05-11 ENCOUNTER — HOSPITAL ENCOUNTER (OUTPATIENT)
Age: 71
Setting detail: SPECIMEN
Discharge: HOME | End: 2021-05-11
Payer: OTHER GOVERNMENT

## 2021-05-11 DIAGNOSIS — R80.9 MICROALBUMINURIA: ICD-10-CM

## 2021-05-11 DIAGNOSIS — N18.2 SECONDARY DIABETES MELLITUS WITH STAGE 2 CHRONIC KIDNEY DISEASE (HCC): ICD-10-CM

## 2021-05-11 DIAGNOSIS — R31.9 HEMATURIA, UNSPECIFIED TYPE: ICD-10-CM

## 2021-05-11 DIAGNOSIS — I12.9 BENIGN HYPERTENSION WITH CKD (CHRONIC KIDNEY DISEASE), STAGE II: ICD-10-CM

## 2021-05-11 DIAGNOSIS — R79.89 ELEVATED BRAIN NATRIURETIC PEPTIDE (BNP) LEVEL: ICD-10-CM

## 2021-05-11 DIAGNOSIS — N18.2 BENIGN HYPERTENSION WITH CKD (CHRONIC KIDNEY DISEASE), STAGE II: ICD-10-CM

## 2021-05-11 DIAGNOSIS — E13.22 SECONDARY DIABETES MELLITUS WITH STAGE 2 CHRONIC KIDNEY DISEASE (HCC): ICD-10-CM

## 2021-05-11 DIAGNOSIS — R80.8 OTHER PROTEINURIA: ICD-10-CM

## 2021-05-11 DIAGNOSIS — N18.2 CKD (CHRONIC KIDNEY DISEASE), STAGE II: ICD-10-CM

## 2021-05-11 DIAGNOSIS — E55.9 VITAMIN D DEFICIENCY: ICD-10-CM

## 2021-05-11 LAB
-: ABNORMAL
ABSOLUTE EOS #: 0.25 K/UL (ref 0–0.44)
ABSOLUTE IMMATURE GRANULOCYTE: 0.04 K/UL (ref 0–0.3)
ABSOLUTE LYMPH #: 1.61 K/UL (ref 1.1–3.7)
ABSOLUTE MONO #: 0.59 K/UL (ref 0.1–1.2)
AMORPHOUS: ABNORMAL
ANION GAP SERPL CALCULATED.3IONS-SCNC: 15 MMOL/L (ref 9–17)
BACTERIA: ABNORMAL
BASOPHILS # BLD: 1 % (ref 0–2)
BASOPHILS ABSOLUTE: 0.04 K/UL (ref 0–0.2)
BILIRUBIN URINE: NEGATIVE
BUN BLDV-MCNC: 40 MG/DL (ref 8–23)
BUN/CREAT BLD: ABNORMAL (ref 9–20)
CALCIUM SERPL-MCNC: 9.7 MG/DL (ref 8.6–10.4)
CASTS UA: ABNORMAL /LPF (ref 0–2)
CASTS UA: ABNORMAL /LPF (ref 0–2)
CHLORIDE BLD-SCNC: 110 MMOL/L (ref 98–107)
CO2: 17 MMOL/L (ref 20–31)
COLOR: ABNORMAL
COMMENT UA: ABNORMAL
CREAT SERPL-MCNC: 0.95 MG/DL (ref 0.5–0.9)
CRYSTALS, UA: ABNORMAL /HPF
DIFFERENTIAL TYPE: ABNORMAL
EOSINOPHILS RELATIVE PERCENT: 3 % (ref 1–4)
EPITHELIAL CELLS UA: ABNORMAL /HPF (ref 0–5)
GFR AFRICAN AMERICAN: >60 ML/MIN
GFR NON-AFRICAN AMERICAN: 58 ML/MIN
GFR SERPL CREATININE-BSD FRML MDRD: ABNORMAL ML/MIN/{1.73_M2}
GFR SERPL CREATININE-BSD FRML MDRD: ABNORMAL ML/MIN/{1.73_M2}
GLUCOSE BLD-MCNC: 185 MG/DL (ref 70–99)
GLUCOSE URINE: ABNORMAL
HCT VFR BLD CALC: 41.6 % (ref 36.3–47.1)
HEMOGLOBIN: 12.5 G/DL (ref 11.9–15.1)
IMMATURE GRANULOCYTES: 1 %
KETONES, URINE: NEGATIVE
LEUKOCYTE ESTERASE, URINE: ABNORMAL
LYMPHOCYTES # BLD: 21 % (ref 24–43)
MAGNESIUM: 1.6 MG/DL (ref 1.6–2.6)
MCH RBC QN AUTO: 28.9 PG (ref 25.2–33.5)
MCHC RBC AUTO-ENTMCNC: 30 G/DL (ref 28.4–34.8)
MCV RBC AUTO: 96.1 FL (ref 82.6–102.9)
MONOCYTES # BLD: 8 % (ref 3–12)
MUCUS: ABNORMAL
NITRITE, URINE: NEGATIVE
NRBC AUTOMATED: 0 PER 100 WBC
OTHER OBSERVATIONS UA: ABNORMAL
PDW BLD-RTO: 15.3 % (ref 11.8–14.4)
PH UA: 5 (ref 5–8)
PHOSPHORUS: 3.9 MG/DL (ref 2.6–4.5)
PLATELET # BLD: 211 K/UL (ref 138–453)
PLATELET ESTIMATE: ABNORMAL
PMV BLD AUTO: 11.5 FL (ref 8.1–13.5)
POTASSIUM SERPL-SCNC: 4.6 MMOL/L (ref 3.7–5.3)
PROTEIN UA: ABNORMAL
RBC # BLD: 4.33 M/UL (ref 3.95–5.11)
RBC # BLD: ABNORMAL 10*6/UL
RBC UA: ABNORMAL /HPF (ref 0–2)
RENAL EPITHELIAL, UA: ABNORMAL /HPF
SEG NEUTROPHILS: 66 % (ref 36–65)
SEGMENTED NEUTROPHILS ABSOLUTE COUNT: 5.33 K/UL (ref 1.5–8.1)
SODIUM BLD-SCNC: 142 MMOL/L (ref 135–144)
SPECIFIC GRAVITY UA: 1.03 (ref 1–1.03)
TRICHOMONAS: ABNORMAL
TURBIDITY: ABNORMAL
URINE HGB: ABNORMAL
UROBILINOGEN, URINE: NORMAL
WBC # BLD: 7.9 K/UL (ref 3.5–11.3)
WBC # BLD: ABNORMAL 10*3/UL
WBC UA: ABNORMAL /HPF (ref 0–5)
YEAST: ABNORMAL

## 2021-05-12 LAB
CULTURE: NORMAL
Lab: NORMAL
SPECIMEN DESCRIPTION: NORMAL

## 2021-05-25 ENCOUNTER — APPOINTMENT (OUTPATIENT)
Dept: CT IMAGING | Age: 71
DRG: 602 | End: 2021-05-25
Payer: OTHER GOVERNMENT

## 2021-05-25 ENCOUNTER — HOSPITAL ENCOUNTER (INPATIENT)
Age: 71
LOS: 1 days | Discharge: HOME | DRG: 602 | End: 2021-05-27
Attending: EMERGENCY MEDICINE | Admitting: INTERNAL MEDICINE
Payer: OTHER GOVERNMENT

## 2021-05-25 ENCOUNTER — NURSE TRIAGE (OUTPATIENT)
Dept: OTHER | Facility: CLINIC | Age: 71
End: 2021-05-25

## 2021-05-25 ENCOUNTER — APPOINTMENT (OUTPATIENT)
Dept: GENERAL RADIOLOGY | Age: 71
DRG: 602 | End: 2021-05-25
Payer: OTHER GOVERNMENT

## 2021-05-25 DIAGNOSIS — L03.90 CELLULITIS, UNSPECIFIED CELLULITIS SITE: Primary | ICD-10-CM

## 2021-05-25 DIAGNOSIS — R31.9 URINARY TRACT INFECTION WITH HEMATURIA, SITE UNSPECIFIED: ICD-10-CM

## 2021-05-25 DIAGNOSIS — N39.0 URINARY TRACT INFECTION WITH HEMATURIA, SITE UNSPECIFIED: ICD-10-CM

## 2021-05-25 DIAGNOSIS — N20.0 KIDNEY STONE: ICD-10-CM

## 2021-05-25 LAB
-: ABNORMAL
ABSOLUTE EOS #: 0.2 K/UL (ref 0–0.4)
ABSOLUTE IMMATURE GRANULOCYTE: ABNORMAL K/UL (ref 0–0.3)
ABSOLUTE LYMPH #: 1.6 K/UL (ref 1–4.8)
ABSOLUTE MONO #: 0.9 K/UL (ref 0.1–1.3)
ALBUMIN SERPL-MCNC: 3.6 G/DL (ref 3.5–5.2)
ALBUMIN/GLOBULIN RATIO: ABNORMAL (ref 1–2.5)
ALP BLD-CCNC: 96 U/L (ref 35–104)
ALT SERPL-CCNC: 11 U/L (ref 5–33)
AMORPHOUS: ABNORMAL
ANION GAP SERPL CALCULATED.3IONS-SCNC: 13 MMOL/L (ref 9–17)
AST SERPL-CCNC: 22 U/L
BACTERIA: ABNORMAL
BASOPHILS # BLD: 1 % (ref 0–2)
BASOPHILS ABSOLUTE: 0.1 K/UL (ref 0–0.2)
BILIRUB SERPL-MCNC: 0.35 MG/DL (ref 0.3–1.2)
BILIRUBIN URINE: NEGATIVE
BUN BLDV-MCNC: 37 MG/DL (ref 8–23)
BUN/CREAT BLD: ABNORMAL (ref 9–20)
CALCIUM SERPL-MCNC: 9.4 MG/DL (ref 8.6–10.4)
CASTS UA: ABNORMAL /LPF
CASTS UA: ABNORMAL /LPF
CHLORIDE BLD-SCNC: 104 MMOL/L (ref 98–107)
CO2: 19 MMOL/L (ref 20–31)
COLOR: YELLOW
COMMENT UA: ABNORMAL
CREAT SERPL-MCNC: 1.29 MG/DL (ref 0.5–0.9)
CRYSTALS, UA: ABNORMAL /HPF
DIFFERENTIAL TYPE: ABNORMAL
EOSINOPHILS RELATIVE PERCENT: 2 % (ref 0–4)
EPITHELIAL CELLS UA: ABNORMAL /HPF
GFR AFRICAN AMERICAN: 49 ML/MIN
GFR NON-AFRICAN AMERICAN: 41 ML/MIN
GFR SERPL CREATININE-BSD FRML MDRD: ABNORMAL ML/MIN/{1.73_M2}
GFR SERPL CREATININE-BSD FRML MDRD: ABNORMAL ML/MIN/{1.73_M2}
GLUCOSE BLD-MCNC: 138 MG/DL (ref 70–99)
GLUCOSE URINE: NEGATIVE
HCT VFR BLD CALC: 36.7 % (ref 36–46)
HEMOGLOBIN: 11.9 G/DL (ref 12–16)
IMMATURE GRANULOCYTES: ABNORMAL %
INR BLD: 1.1
KETONES, URINE: NEGATIVE
LEUKOCYTE ESTERASE, URINE: ABNORMAL
LYMPHOCYTES # BLD: 17 % (ref 24–44)
MAGNESIUM: 1.5 MG/DL (ref 1.6–2.6)
MCH RBC QN AUTO: 30.5 PG (ref 26–34)
MCHC RBC AUTO-ENTMCNC: 32.5 G/DL (ref 31–37)
MCV RBC AUTO: 93.8 FL (ref 80–100)
MONOCYTES # BLD: 9 % (ref 1–7)
MUCUS: ABNORMAL
NITRITE, URINE: NEGATIVE
NRBC AUTOMATED: ABNORMAL PER 100 WBC
OTHER OBSERVATIONS UA: ABNORMAL
PARTIAL THROMBOPLASTIN TIME: 33.4 SEC (ref 24–36)
PDW BLD-RTO: 16.4 % (ref 11.5–14.9)
PH UA: 5.5 (ref 5–8)
PLATELET # BLD: 198 K/UL (ref 150–450)
PLATELET ESTIMATE: ABNORMAL
PMV BLD AUTO: 8.9 FL (ref 6–12)
POTASSIUM SERPL-SCNC: 3.8 MMOL/L (ref 3.7–5.3)
PROTEIN UA: ABNORMAL
PROTHROMBIN TIME: 13.8 SEC (ref 11.8–14.6)
RBC # BLD: 3.91 M/UL (ref 4–5.2)
RBC # BLD: ABNORMAL 10*6/UL
RBC UA: ABNORMAL /HPF
RENAL EPITHELIAL, UA: ABNORMAL /HPF
SEG NEUTROPHILS: 71 % (ref 36–66)
SEGMENTED NEUTROPHILS ABSOLUTE COUNT: 7 K/UL (ref 1.3–9.1)
SODIUM BLD-SCNC: 136 MMOL/L (ref 135–144)
SPECIFIC GRAVITY UA: 1.02 (ref 1–1.03)
TOTAL PROTEIN: 6.3 G/DL (ref 6.4–8.3)
TRICHOMONAS: ABNORMAL
TSH SERPL DL<=0.05 MIU/L-ACNC: 2.06 MIU/L (ref 0.3–5)
TURBIDITY: ABNORMAL
URINE HGB: ABNORMAL
UROBILINOGEN, URINE: NORMAL
WBC # BLD: 9.7 K/UL (ref 3.5–11)
WBC # BLD: ABNORMAL 10*3/UL
WBC UA: ABNORMAL /HPF
YEAST: ABNORMAL

## 2021-05-25 PROCEDURE — 85610 PROTHROMBIN TIME: CPT

## 2021-05-25 PROCEDURE — 81001 URINALYSIS AUTO W/SCOPE: CPT

## 2021-05-25 PROCEDURE — 70450 CT HEAD/BRAIN W/O DYE: CPT

## 2021-05-25 PROCEDURE — 96365 THER/PROPH/DIAG IV INF INIT: CPT

## 2021-05-25 PROCEDURE — 99284 EMERGENCY DEPT VISIT MOD MDM: CPT

## 2021-05-25 PROCEDURE — 2580000003 HC RX 258: Performed by: EMERGENCY MEDICINE

## 2021-05-25 PROCEDURE — 80053 COMPREHEN METABOLIC PANEL: CPT

## 2021-05-25 PROCEDURE — 83735 ASSAY OF MAGNESIUM: CPT

## 2021-05-25 PROCEDURE — 86403 PARTICLE AGGLUT ANTBDY SCRN: CPT

## 2021-05-25 PROCEDURE — 84443 ASSAY THYROID STIM HORMONE: CPT

## 2021-05-25 PROCEDURE — 74176 CT ABD & PELVIS W/O CONTRAST: CPT

## 2021-05-25 PROCEDURE — 6360000002 HC RX W HCPCS: Performed by: EMERGENCY MEDICINE

## 2021-05-25 PROCEDURE — 85730 THROMBOPLASTIN TIME PARTIAL: CPT

## 2021-05-25 PROCEDURE — 36415 COLL VENOUS BLD VENIPUNCTURE: CPT

## 2021-05-25 PROCEDURE — 96366 THER/PROPH/DIAG IV INF ADDON: CPT

## 2021-05-25 PROCEDURE — 85025 COMPLETE CBC W/AUTO DIFF WBC: CPT

## 2021-05-25 PROCEDURE — 87086 URINE CULTURE/COLONY COUNT: CPT

## 2021-05-25 PROCEDURE — 71046 X-RAY EXAM CHEST 2 VIEWS: CPT

## 2021-05-25 PROCEDURE — 93005 ELECTROCARDIOGRAM TRACING: CPT | Performed by: EMERGENCY MEDICINE

## 2021-05-25 RX ORDER — CIPROFLOXACIN 2 MG/ML
400 INJECTION, SOLUTION INTRAVENOUS ONCE
Status: COMPLETED | OUTPATIENT
Start: 2021-05-25 | End: 2021-05-26

## 2021-05-25 RX ADMIN — MAGNESIUM SULFATE HEPTAHYDRATE 2000 MG: 500 INJECTION, SOLUTION INTRAMUSCULAR; INTRAVENOUS at 22:27

## 2021-05-25 RX ADMIN — CIPROFLOXACIN 400 MG: 2 INJECTION, SOLUTION INTRAVENOUS at 23:59

## 2021-05-25 ASSESSMENT — PAIN DESCRIPTION - LOCATION: LOCATION: KNEE

## 2021-05-25 NOTE — TELEPHONE ENCOUNTER
Received call from Salbador at [facility] [place] with [employer] Complaint. Brief description of triage: redness of bilateral legs, worst HA of her life, and confusion    Triage indicates for patient to go the ED now    Care advice provided, patient verbalizes understanding; denies any other questions or concerns; instructed to call back for any new or worsening symptoms. Attention Provider: Thank you for allowing me to participate in the care of your patient. The patient was connected to triage in response to information provided to the [facility]. Please do not respond through this encounter as the response is not directed to a shared pool. Reason for Disposition   SEVERE headache, states 'worst headache' of life    Answer Assessment - Initial Assessment Questions  1. LOCATION: \"Where does it hurt? \"       Patient is confused and unable to answer many questions    2. ONSET: \"When did the headache start? \" (Minutes, hours or days)       2 days    3. PATTERN: \"Does the pain come and go, or has it been constant since it started? \"      Comes and goes    4. SEVERITY: \"How bad is the pain? \" and \"What does it keep you from doing? \"  (e.g., Scale 1-10; mild, moderate, or severe)    - MILD (1-3): doesn't interfere with normal activities     - MODERATE (4-7): interferes with normal activities or awakens from sleep     - SEVERE (8-10): excruciating pain, unable to do any normal activities         \"this is the worst HA of my life\"    5. RECURRENT SYMPTOM: \"Have you ever had headaches before? \" If so, ask: \"When was the last time? \" and \"What happened that time? \"       Yes    6. CAUSE: \"What do you think is causing the headache? \"      Unsure    7. MIGRAINE: \"Have you been diagnosed with migraine headaches? \" If so, ask: \"Is this headache similar? \"       Unsure    8. HEAD INJURY: \"Has there been any recent injury to the head? \"       Denies     9. OTHER SYMPTOMS: \"Do you have any other symptoms? \" (fever, stiff

## 2021-05-26 ENCOUNTER — TELEPHONE (OUTPATIENT)
Dept: FAMILY MEDICINE CLINIC | Age: 71
End: 2021-05-26

## 2021-05-26 PROBLEM — E83.42 HYPOMAGNESEMIA: Status: ACTIVE | Noted: 2021-05-26

## 2021-05-26 PROBLEM — L03.116 CELLULITIS OF BOTH LOWER EXTREMITIES: Status: ACTIVE | Noted: 2021-05-26

## 2021-05-26 PROBLEM — R41.82 ALTERED MENTAL STATUS: Status: ACTIVE | Noted: 2021-05-26

## 2021-05-26 PROBLEM — R41.0 CONFUSION: Status: ACTIVE | Noted: 2021-05-26

## 2021-05-26 PROBLEM — N20.0 KIDNEY STONE: Status: ACTIVE | Noted: 2021-05-26

## 2021-05-26 PROBLEM — L03.115 CELLULITIS OF BOTH LOWER EXTREMITIES: Status: ACTIVE | Noted: 2021-05-26

## 2021-05-26 PROBLEM — B36.9 FUNGAL RASH OF TORSO: Status: ACTIVE | Noted: 2021-05-26

## 2021-05-26 LAB
ANION GAP SERPL CALCULATED.3IONS-SCNC: 13 MMOL/L (ref 9–17)
BUN BLDV-MCNC: 31 MG/DL (ref 8–23)
BUN/CREAT BLD: ABNORMAL (ref 9–20)
CALCIUM SERPL-MCNC: 9.4 MG/DL (ref 8.6–10.4)
CHLORIDE BLD-SCNC: 108 MMOL/L (ref 98–107)
CO2: 20 MMOL/L (ref 20–31)
CREAT SERPL-MCNC: 1.01 MG/DL (ref 0.5–0.9)
CREATININE URINE: 26.2 MG/DL (ref 28–217)
EKG ATRIAL RATE: 86 BPM
EKG P AXIS: 96 DEGREES
EKG P-R INTERVAL: 178 MS
EKG Q-T INTERVAL: 362 MS
EKG QRS DURATION: 88 MS
EKG QTC CALCULATION (BAZETT): 433 MS
EKG R AXIS: 35 DEGREES
EKG T AXIS: 107 DEGREES
EKG VENTRICULAR RATE: 86 BPM
GFR AFRICAN AMERICAN: >60 ML/MIN
GFR NON-AFRICAN AMERICAN: 54 ML/MIN
GFR SERPL CREATININE-BSD FRML MDRD: ABNORMAL ML/MIN/{1.73_M2}
GFR SERPL CREATININE-BSD FRML MDRD: ABNORMAL ML/MIN/{1.73_M2}
GLUCOSE BLD-MCNC: 106 MG/DL (ref 65–105)
GLUCOSE BLD-MCNC: 108 MG/DL (ref 65–105)
GLUCOSE BLD-MCNC: 119 MG/DL (ref 70–99)
GLUCOSE BLD-MCNC: 213 MG/DL (ref 65–105)
GLUCOSE BLD-MCNC: 85 MG/DL (ref 65–105)
HCT VFR BLD CALC: 39.6 % (ref 36–46)
HEMOGLOBIN: 12.6 G/DL (ref 12–16)
MAGNESIUM: 2 MG/DL (ref 1.6–2.6)
MCH RBC QN AUTO: 29.9 PG (ref 26–34)
MCHC RBC AUTO-ENTMCNC: 31.7 G/DL (ref 31–37)
MCV RBC AUTO: 94.3 FL (ref 80–100)
NRBC AUTOMATED: ABNORMAL PER 100 WBC
PDW BLD-RTO: 16.5 % (ref 11.5–14.9)
PLATELET # BLD: 176 K/UL (ref 150–450)
PMV BLD AUTO: 9.2 FL (ref 6–12)
POTASSIUM SERPL-SCNC: 3.8 MMOL/L (ref 3.7–5.3)
RBC # BLD: 4.2 M/UL (ref 4–5.2)
SODIUM BLD-SCNC: 141 MMOL/L (ref 135–144)
TOTAL PROTEIN, URINE: 534 MG/DL
URINE TOTAL PROTEIN CREATININE RATIO: 20.38 (ref 0–0.2)
WBC # BLD: 7.7 K/UL (ref 3.5–11)

## 2021-05-26 PROCEDURE — 2500000003 HC RX 250 WO HCPCS: Performed by: NURSE PRACTITIONER

## 2021-05-26 PROCEDURE — 82947 ASSAY GLUCOSE BLOOD QUANT: CPT

## 2021-05-26 PROCEDURE — 2580000003 HC RX 258: Performed by: NURSE PRACTITIONER

## 2021-05-26 PROCEDURE — 85027 COMPLETE CBC AUTOMATED: CPT

## 2021-05-26 PROCEDURE — 6370000000 HC RX 637 (ALT 250 FOR IP): Performed by: INTERNAL MEDICINE

## 2021-05-26 PROCEDURE — 2060000000 HC ICU INTERMEDIATE R&B

## 2021-05-26 PROCEDURE — 2580000003 HC RX 258: Performed by: EMERGENCY MEDICINE

## 2021-05-26 PROCEDURE — 6360000002 HC RX W HCPCS: Performed by: NURSE PRACTITIONER

## 2021-05-26 PROCEDURE — 82570 ASSAY OF URINE CREATININE: CPT

## 2021-05-26 PROCEDURE — 36415 COLL VENOUS BLD VENIPUNCTURE: CPT

## 2021-05-26 PROCEDURE — 6370000000 HC RX 637 (ALT 250 FOR IP): Performed by: NURSE PRACTITIONER

## 2021-05-26 PROCEDURE — 83735 ASSAY OF MAGNESIUM: CPT

## 2021-05-26 PROCEDURE — 99223 1ST HOSP IP/OBS HIGH 75: CPT | Performed by: INTERNAL MEDICINE

## 2021-05-26 PROCEDURE — 80048 BASIC METABOLIC PNL TOTAL CA: CPT

## 2021-05-26 PROCEDURE — 93010 ELECTROCARDIOGRAM REPORT: CPT | Performed by: INTERNAL MEDICINE

## 2021-05-26 PROCEDURE — 84156 ASSAY OF PROTEIN URINE: CPT

## 2021-05-26 PROCEDURE — 6360000002 HC RX W HCPCS: Performed by: EMERGENCY MEDICINE

## 2021-05-26 RX ORDER — HYDRALAZINE HYDROCHLORIDE 20 MG/ML
10 INJECTION INTRAMUSCULAR; INTRAVENOUS EVERY 6 HOURS PRN
Status: DISCONTINUED | OUTPATIENT
Start: 2021-05-26 | End: 2021-05-27 | Stop reason: HOSPADM

## 2021-05-26 RX ORDER — SODIUM CHLORIDE 0.9 % (FLUSH) 0.9 %
5-40 SYRINGE (ML) INJECTION EVERY 12 HOURS SCHEDULED
Status: DISCONTINUED | OUTPATIENT
Start: 2021-05-26 | End: 2021-05-27 | Stop reason: HOSPADM

## 2021-05-26 RX ORDER — SODIUM BICARBONATE 650 MG/1
650 TABLET ORAL 3 TIMES DAILY
Status: DISCONTINUED | OUTPATIENT
Start: 2021-05-26 | End: 2021-05-27 | Stop reason: HOSPADM

## 2021-05-26 RX ORDER — SODIUM CHLORIDE 0.9 % (FLUSH) 0.9 %
10 SYRINGE (ML) INJECTION PRN
Status: DISCONTINUED | OUTPATIENT
Start: 2021-05-26 | End: 2021-05-27 | Stop reason: HOSPADM

## 2021-05-26 RX ORDER — SPIRONOLACTONE 25 MG/1
25 TABLET ORAL
Status: DISCONTINUED | OUTPATIENT
Start: 2021-05-26 | End: 2021-05-27 | Stop reason: HOSPADM

## 2021-05-26 RX ORDER — POLYETHYLENE GLYCOL 3350 17 G/17G
17 POWDER, FOR SOLUTION ORAL DAILY PRN
Status: DISCONTINUED | OUTPATIENT
Start: 2021-05-26 | End: 2021-05-27 | Stop reason: HOSPADM

## 2021-05-26 RX ORDER — NICOTINE POLACRILEX 4 MG
15 LOZENGE BUCCAL PRN
Status: DISCONTINUED | OUTPATIENT
Start: 2021-05-26 | End: 2021-05-27 | Stop reason: HOSPADM

## 2021-05-26 RX ORDER — SODIUM CHLORIDE 9 MG/ML
25 INJECTION, SOLUTION INTRAVENOUS PRN
Status: DISCONTINUED | OUTPATIENT
Start: 2021-05-26 | End: 2021-05-27 | Stop reason: HOSPADM

## 2021-05-26 RX ORDER — MAGNESIUM SULFATE 1 G/100ML
1000 INJECTION INTRAVENOUS PRN
Status: DISCONTINUED | OUTPATIENT
Start: 2021-05-26 | End: 2021-05-27 | Stop reason: HOSPADM

## 2021-05-26 RX ORDER — ONDANSETRON 2 MG/ML
4 INJECTION INTRAMUSCULAR; INTRAVENOUS EVERY 6 HOURS PRN
Status: DISCONTINUED | OUTPATIENT
Start: 2021-05-26 | End: 2021-05-27 | Stop reason: HOSPADM

## 2021-05-26 RX ORDER — POTASSIUM CHLORIDE 20 MEQ/1
40 TABLET, EXTENDED RELEASE ORAL PRN
Status: DISCONTINUED | OUTPATIENT
Start: 2021-05-26 | End: 2021-05-27 | Stop reason: HOSPADM

## 2021-05-26 RX ORDER — AMLODIPINE BESYLATE 5 MG/1
5 TABLET ORAL DAILY
Status: DISCONTINUED | OUTPATIENT
Start: 2021-05-26 | End: 2021-05-27 | Stop reason: HOSPADM

## 2021-05-26 RX ORDER — SODIUM CHLORIDE 9 MG/ML
INJECTION, SOLUTION INTRAVENOUS CONTINUOUS
Status: DISCONTINUED | OUTPATIENT
Start: 2021-05-26 | End: 2021-05-26

## 2021-05-26 RX ORDER — HYDROCHLOROTHIAZIDE 25 MG/1
25 TABLET ORAL
Status: DISCONTINUED | OUTPATIENT
Start: 2021-05-26 | End: 2021-05-27 | Stop reason: HOSPADM

## 2021-05-26 RX ORDER — METOPROLOL TARTRATE 50 MG/1
50 TABLET, FILM COATED ORAL 2 TIMES DAILY
Status: DISCONTINUED | OUTPATIENT
Start: 2021-05-26 | End: 2021-05-27 | Stop reason: HOSPADM

## 2021-05-26 RX ORDER — PROMETHAZINE HYDROCHLORIDE 25 MG/1
12.5 TABLET ORAL EVERY 6 HOURS PRN
Status: DISCONTINUED | OUTPATIENT
Start: 2021-05-26 | End: 2021-05-27 | Stop reason: HOSPADM

## 2021-05-26 RX ORDER — ALLOPURINOL 300 MG/1
300 TABLET ORAL DAILY
Status: DISCONTINUED | OUTPATIENT
Start: 2021-05-26 | End: 2021-05-27 | Stop reason: HOSPADM

## 2021-05-26 RX ORDER — ALBUTEROL SULFATE 2.5 MG/3ML
2.5 SOLUTION RESPIRATORY (INHALATION) EVERY 6 HOURS PRN
Status: DISCONTINUED | OUTPATIENT
Start: 2021-05-26 | End: 2021-05-27 | Stop reason: HOSPADM

## 2021-05-26 RX ORDER — CLOPIDOGREL BISULFATE 75 MG/1
75 TABLET ORAL DAILY
Status: DISCONTINUED | OUTPATIENT
Start: 2021-05-26 | End: 2021-05-27 | Stop reason: HOSPADM

## 2021-05-26 RX ORDER — POTASSIUM CHLORIDE 7.45 MG/ML
10 INJECTION INTRAVENOUS PRN
Status: DISCONTINUED | OUTPATIENT
Start: 2021-05-26 | End: 2021-05-27 | Stop reason: HOSPADM

## 2021-05-26 RX ORDER — DEXTROSE MONOHYDRATE 50 MG/ML
100 INJECTION, SOLUTION INTRAVENOUS PRN
Status: DISCONTINUED | OUTPATIENT
Start: 2021-05-26 | End: 2021-05-27 | Stop reason: HOSPADM

## 2021-05-26 RX ORDER — M-VIT,TX,IRON,MINS/CALC/FOLIC 27MG-0.4MG
1 TABLET ORAL DAILY
Status: DISCONTINUED | OUTPATIENT
Start: 2021-05-26 | End: 2021-05-27 | Stop reason: HOSPADM

## 2021-05-26 RX ORDER — DEXTROSE MONOHYDRATE 25 G/50ML
12.5 INJECTION, SOLUTION INTRAVENOUS PRN
Status: DISCONTINUED | OUTPATIENT
Start: 2021-05-26 | End: 2021-05-27 | Stop reason: HOSPADM

## 2021-05-26 RX ORDER — CIPROFLOXACIN 2 MG/ML
400 INJECTION, SOLUTION INTRAVENOUS EVERY 12 HOURS
Status: DISCONTINUED | OUTPATIENT
Start: 2021-05-26 | End: 2021-05-27 | Stop reason: HOSPADM

## 2021-05-26 RX ORDER — ACETAMINOPHEN 650 MG/1
650 SUPPOSITORY RECTAL EVERY 6 HOURS PRN
Status: DISCONTINUED | OUTPATIENT
Start: 2021-05-26 | End: 2021-05-27 | Stop reason: HOSPADM

## 2021-05-26 RX ORDER — ATORVASTATIN CALCIUM 40 MG/1
40 TABLET, FILM COATED ORAL NIGHTLY
Status: DISCONTINUED | OUTPATIENT
Start: 2021-05-26 | End: 2021-05-27 | Stop reason: HOSPADM

## 2021-05-26 RX ORDER — ACETAMINOPHEN 325 MG/1
650 TABLET ORAL EVERY 6 HOURS PRN
Status: DISCONTINUED | OUTPATIENT
Start: 2021-05-26 | End: 2021-05-27 | Stop reason: HOSPADM

## 2021-05-26 RX ADMIN — METOPROLOL TARTRATE 50 MG: 50 TABLET, FILM COATED ORAL at 09:35

## 2021-05-26 RX ADMIN — INSULIN LISPRO 2 UNITS: 100 INJECTION, SOLUTION INTRAVENOUS; SUBCUTANEOUS at 21:30

## 2021-05-26 RX ADMIN — ANTI-FUNGAL POWDER MICONAZOLE NITRATE TALC FREE: 1.42 POWDER TOPICAL at 09:37

## 2021-05-26 RX ADMIN — CIPROFLOXACIN 400 MG: 2 INJECTION, SOLUTION INTRAVENOUS at 11:59

## 2021-05-26 RX ADMIN — ATORVASTATIN CALCIUM 40 MG: 40 TABLET, FILM COATED ORAL at 21:27

## 2021-05-26 RX ADMIN — APIXABAN 5 MG: 5 TABLET, FILM COATED ORAL at 21:27

## 2021-05-26 RX ADMIN — HYDRALAZINE HYDROCHLORIDE 10 MG: 20 INJECTION INTRAMUSCULAR; INTRAVENOUS at 03:20

## 2021-05-26 RX ADMIN — HYDRALAZINE HYDROCHLORIDE 10 MG: 20 INJECTION INTRAMUSCULAR; INTRAVENOUS at 13:46

## 2021-05-26 RX ADMIN — SODIUM BICARBONATE 650 MG: 650 TABLET ORAL at 09:35

## 2021-05-26 RX ADMIN — ANTI-FUNGAL POWDER MICONAZOLE NITRATE TALC FREE: 1.42 POWDER TOPICAL at 21:27

## 2021-05-26 RX ADMIN — CLOPIDOGREL BISULFATE 75 MG: 75 TABLET ORAL at 09:36

## 2021-05-26 RX ADMIN — HYDROCHLOROTHIAZIDE 25 MG: 25 TABLET ORAL at 07:48

## 2021-05-26 RX ADMIN — MULTIPLE VITAMINS W/ MINERALS TAB 1 TABLET: TAB at 09:35

## 2021-05-26 RX ADMIN — ACETAMINOPHEN 650 MG: 325 TABLET, FILM COATED ORAL at 13:46

## 2021-05-26 RX ADMIN — HYDRALAZINE HYDROCHLORIDE 10 MG: 20 INJECTION INTRAMUSCULAR; INTRAVENOUS at 09:36

## 2021-05-26 RX ADMIN — METOPROLOL TARTRATE 50 MG: 50 TABLET, FILM COATED ORAL at 21:27

## 2021-05-26 RX ADMIN — SODIUM BICARBONATE 650 MG: 650 TABLET ORAL at 21:27

## 2021-05-26 RX ADMIN — SPIRONOLACTONE 25 MG: 25 TABLET, FILM COATED ORAL at 07:48

## 2021-05-26 RX ADMIN — ALLOPURINOL 300 MG: 300 TABLET ORAL at 11:59

## 2021-05-26 RX ADMIN — SODIUM CHLORIDE: 9 INJECTION, SOLUTION INTRAVENOUS at 03:19

## 2021-05-26 RX ADMIN — APIXABAN 5 MG: 5 TABLET, FILM COATED ORAL at 09:30

## 2021-05-26 RX ADMIN — Medication 10 ML: at 21:27

## 2021-05-26 RX ADMIN — ACETAMINOPHEN 650 MG: 325 TABLET, FILM COATED ORAL at 03:19

## 2021-05-26 RX ADMIN — AMLODIPINE BESYLATE 5 MG: 5 TABLET ORAL at 11:58

## 2021-05-26 RX ADMIN — VANCOMYCIN HYDROCHLORIDE 1750 MG: 1 INJECTION, POWDER, LYOPHILIZED, FOR SOLUTION INTRAVENOUS at 01:12

## 2021-05-26 ASSESSMENT — ENCOUNTER SYMPTOMS
COUGH: 0
RHINORRHEA: 0
PHOTOPHOBIA: 0
ALLERGIC/IMMUNOLOGIC NEGATIVE: 1
SHORTNESS OF BREATH: 0
VOMITING: 0
ABDOMINAL PAIN: 0
DIARRHEA: 0
COLOR CHANGE: 1
NAUSEA: 0

## 2021-05-26 ASSESSMENT — PAIN SCALES - GENERAL
PAINLEVEL_OUTOF10: 0
PAINLEVEL_OUTOF10: 4
PAINLEVEL_OUTOF10: 2
PAINLEVEL_OUTOF10: 0

## 2021-05-26 ASSESSMENT — VISUAL ACUITY: OU: 1

## 2021-05-26 NOTE — ED NOTES
Mode of arrival (squad #, walk in, police, etc) : Walked into triage        Chief complaint(s): Cellulitis, Confusion        Arrival Note (brief scenario, treatment PTA, etc). : Complaining of redness of lower legs x 2 weeks. Hx Cellulitis. Patient has been confused x 2 days. Daughter states patient gets confused when she has a UTI. A/O X 3.         C= \"Have you ever felt that you should Cut down on your drinking? \"  No  A= \"Have people Annoyed you by criticizing your drinking? \"  No  G= \"Have you ever felt bad or Guilty about your drinking? \"  No  E= \"Have you ever had a drink as an Eye-opener first thing in the morning to steady your nerves or to help a hangover? \"  No      Deferred []      Reason for deferring: N/A    *If yes to two or more: probable alcohol abuse. Kelechi Bustos RN  05/25/21 2017

## 2021-05-26 NOTE — PROGRESS NOTES
Pharmacy Note  Vancomycin Consult    Charlie Espino is a 69 y.o. female ordered Vancomycin for skin and soft tissue infection; consult received from Dr. Paul Holland to manage therapy. Patient Active Problem List   Diagnosis    Vitamin D deficiency    Venous insufficiency of both lower extremities    Type 2 diabetes mellitus with stage 3 chronic kidney disease, without long-term current use of insulin (HCC)    Coronary artery disease involving native coronary artery of native heart without angina pectoris    Iron deficiency anemia    Stage 3 chronic kidney disease    Colonoscopy refused    Pneumococcal vaccination declined    Impaired hearing    Chronic diastolic CHF (congestive heart failure) (HCC)    Chronic obstructive pulmonary disease ([facility])    HTN. Benign hypertension with CKD (chronic kidney disease) stage III ([facility])    Gout with tophi    Hyperlipidemia with target LDL less than 70    Dry skin dermatitis HANDS    Meningioma, cerebral ([facility])    Paroxysmal atrial fibrillation ([facility] [address]    Influenza vaccination declined    Meningioma ([facility] [address]    Hematuria    Cellulitis of both lower extremities       Allergies:  Aleve [naproxen sodium], Pioglitazone, Claritin [loratadine], Keflex [cephalexin], and Lisinopril     Temp max: 98.1 F    Recent Labs     05/25/21 2108   BUN 37*       Recent Labs     05/25/21 2108   CREATININE 1.29*       Recent Labs     05/25/21 2108   WBC 9.7       No intake or output data in the 24 hours ending 05/26/21 0008    Culture Date      Source                       Results  See micro    Ht Readings from Last 1 Encounters:   05/25/21 5' 1\" (1.549 m)        Wt Readings from Last 1 Encounters:   05/25/21 145 lb (65.8 kg)         Estimated Creatinine Clearance: 35 mL/min (A) (based on SCr of 1.29 mg/dL (H)). GOAL TROUGH: 10-20 mcg/mL    Assessment/Plan:  Will initiate vancomycin 1750 mg IV loading dose followed by 1000 mg every 24 hours.   Timing of trough level will be determined based on culture results, renal function, and clinical response. Thank you for the consult. Will continue to follow.   [facility]    5/26/2021   12:13 AM

## 2021-05-26 NOTE — H&P
[address] [facility] [place]     HISTORY AND PHYSICAL EXAMINATION            Date:   5/26/2021  Patientname:  Kell Ojeda  Date of admission:  5/25/2021  8:07 PM  MRN:   [id number]  Account:  [de-identified]  YOB: 1950  PCP:    Gerson Rosado MD  Room:   2108/2108-01  Code Status:    Full Code    CHIEF COMPLAINT     Chief Complaint   Patient presents with    Cellulitis    Hallucinations       HISTORY OF PRESENT ILLNESS  (Character, Onset, Location, Duration,  Exacerbating/RelievingFactors, Radiation,   Associated Symptoms, Severity )      The patient is a 69 y.o.  female, with a history of CAD, proximal atrial fibrillation on Eliquis, CVA, brain mass, bilateral lower extremity cellulitis and wounds, UTI, CHF, hypertension, hyperlipidemia, diabetes mellitus, CABG, iron deficiency anemia, and CKD. Patient was brought to the emergency department by her daughter for increased confusion over the past several days. Daughter is concerned patient may have infection. She states that the redness and swelling to the patient's legs have worsened recently. Daughter states when the patient gets confused she usually has a urinary tract infection. Patient denies chest pain, cough, shortness of breath, headache, dizziness, abdominal pain, nausea or vomiting. No fever or chills. Patient lives with daughter.     HPI   1) Location/Symptom confusion, hallucinations, bilateral lower extremity redness and pain  2) Timing/Onset: Increased confusion hallucinations the past several days, gradually worsening lower extremity redness over the past week  3) Context/Setting: History of UTIs and leg cellulitis, patient has a daughter  4) Quality: Throbbing  5) Duration: continuous   6) Modifying Factors: No aggravating or alleviating factors  7) Severity: moderate     PAST MEDICAL HISTORY   Patient  has a past medical history of Acute CVA (cerebrovascular accident) ([facility] [address] Arthritis, Brain mass, Cellulitis and abscess, Cellulitis and abscess of unspecified site, Cellulitis of left lower extremity, Cellulitis of lower extremity, CHF (congestive heart failure) ([facility] [address] Chronic kidney disease, unspecified, Coronary atherosclerosis of native coronary artery, Essential hypertension, Essential hypertension, malignant, Hallucinations, Hard of hearing, Head contusion, Hypokalemia, Iron deficiency anemia, unspecified, Myocardial infarction ([address] Other and unspecified hyperlipidemia, Pure hypercholesterolemia, Toxic metabolic encephalopathy, Type II or unspecified type diabetes mellitus without mention of complication, not stated as uncontrolled, Unspecified asthma(493.90), Unspecified venous (peripheral) insufficiency, Unspecified vitamin D deficiency, and UTI (urinary tract infection). PAST SURGICAL HISTORY    Patient  has a past surgical history that includes Tonsillectomy and adenoidectomy and Coronary artery bypass graft. FAMILY HISTORY    Patient family history includes Diabetes in her maternal grandmother, mother, and sister; Heart Disease in her father and mother; High Blood Pressure in her sister. SOCIAL HISTORY    Patient  reports that she quit smoking about 21 years ago. She has a 2.50 pack-year smoking history. She has never used smokeless tobacco. She reports previous alcohol use. She reports that she does not use drugs. HOME MEDICATIONS        Prior to Admission medications    Medication Sig Start Date End Date Taking? Authorizing Provider   magnesium oxide (MAG-OX) 400 MG tablet Take 1 tablet by mouth 2 times daily 5/18/21   [facility], MD   sodium bicarbonate 650 MG tablet Take 1 tablet by mouth 3 times daily 5/18/21 5/18/22  [facility], MD   vitamin D (ERGOCALCIFEROL) 1.25 MG (48813 UT) CAPS capsule Take 1 capsule by mouth once a week 3/19/21   Kamla Guthrie MD   blood glucose test strips (FREESTYLE LITE) strip 1 each by In Vitro route daily As needed.  3/19/21   Kayleigh MD Oswaldo Mejiayle Lancets MISC 1 each by Does not apply route daily Patient needs to contact office before any further refills will be approved 3/19/21   Holley Petit MD   desloratadine (CLARINEX) 5 MG tablet Take 1 tablet by mouth daily 3/19/21   Holley Petit MD   allopurinol (ZYLOPRIM) 300 MG tablet Take 1 tablet by mouth daily Per rheumatologist 3/19/21   Holley Petit MD   clopidogrel (PLAVIX) 75 MG tablet Take 1 tablet by mouth daily 3/18/21   Holley Petit MD   spironolactone (ALDACTONE) 25 MG tablet Take 1 tablet by mouth every morning (before breakfast) Water pill 3/18/21   Holley Petit MD   hydroCHLOROthiazide (HYDRODIURIL) 25 MG tablet Take 1 tablet by mouth every morning (before breakfast) Water pill 3/18/21   Holley Petit MD   atorvastatin (LIPITOR) 40 MG tablet Take 1 tablet by mouth daily 3/12/21   Holley Petit MD   albuterol sulfate HFA (PROAIR HFA) 108 (90 Base) MCG/ACT inhaler Inhale 2 puffs into the lungs every 6 hours as needed for Wheezing or Shortness of Breath (cough) 3/12/21   Holley Petit MD   metFORMIN (GLUCOPHAGE) 500 MG tablet Take 1 tablet by mouth 2 times daily (with meals) 10/1/20   Holley Petit MD   apixaban (ELIQUIS) 5 MG TABS tablet Take 1 tablet by mouth 2 times daily 7/31/20   Tiffany Alvarado APRN - CNP   metoprolol tartrate (LOPRESSOR) 50 MG tablet Take 1 tablet by mouth 2 times daily 3/3/20   Holley Petit MD   Multiple Vitamins-Minerals (THERAPEUTIC MULTIVITAMIN-MINERALS) tablet Take 1 tablet by mouth daily    Historical Provider, MD   Blood Pressure KIT 1 kit by Does not apply route three times daily 12/19/19   David River MD   blood glucose monitor kit and supplies 1 kit by Other route three times daily Dispense Butterfly Elite CBG Device 8/20/19   Jazmin Shah MD       ALLERGIES      Aleve [naproxen sodium], Pioglitazone, Claritin [loratadine], Keflex [cephalexin], and pulses are 1+ on the right side and 1+ on the left side. Heart sounds: Normal heart sounds. Arteriovenous access: left arteriovenous access is present. Pulmonary:      Effort: Pulmonary effort is normal.      Breath sounds: Normal breath sounds. No decreased breath sounds, wheezing, rhonchi or rales. Abdominal:      General: Bowel sounds are normal.      Palpations: Abdomen is soft. Tenderness: There is no abdominal tenderness. Musculoskeletal:      Right upper arm: Normal.      Left upper arm: Normal.      Cervical back: Normal range of motion and neck supple. Right lower le+ Edema present. Left lower le+ Edema present. Legs:       Comments: There is erythema, edema, and seeping wounds to both lower extremities from below the knee down to the feet. No calf tenderness. Skin:     General: Skin is warm and dry. Capillary Refill: Capillary refill takes 2 to 3 seconds. Findings: Erythema and rash present. Neurological:      Mental Status: She is alert. GCS: GCS eye subscore is 4. GCS verbal subscore is 4. GCS motor subscore is 6. Cranial Nerves: Cranial nerves are intact. Sensory: Sensation is intact. Motor: Motor function is intact. Comments: Patient alert and oriented to herself and year. Patient is able to answer most questions but does give confused response at times. Moves all extremities to command. Exhibits 5 out of 5 equal strength upper and lower extremities. Psychiatric:         Attention and Perception: Attention normal.         Mood and Affect: Mood normal.         Behavior: Behavior is cooperative. Thought Content: Thought content normal.         Cognition and Memory: Cognition is impaired. Judgment: Judgment is inappropriate.        DIAGNOSTICS      EKG: (as documented in ED note):Sinus rhythm with PVCs heart rate 86 bpm no prolonged intervals normal axis no acute ST or T wave change       Labs:  CBC: Recent Labs     05/25/21 2108   WBC 9.7   HGB 11.9*        BMP:    Recent Labs     05/25/21 2108      K 3.8      CO2 19*   BUN 37*   CREATININE 1.29*   GLUCOSE 138*     S. Calcium:  Recent Labs     05/25/21 2108   CALCIUM 9.4     S. Ionized Calcium:No results for input(s): IONCA in the last 72 hours. S. Magnesium:  Recent Labs     05/25/21 2108   MG 1.5*     S. Phosphorus:No results for input(s): PHOS in the last 72 hours. S. Glucose:No results for input(s): POCGLU in the last 72 hours. Glycosylated hemoglobin A1C:   Lab Results   Component Value Date    LABA1C 5.9 03/12/2021     Hepatic:   Recent Labs     05/25/21 2108   AST 22   ALT 11   ALKPHOS 96     CARDIAC ENZY: No results for input(s): CKTOTAL, CKMB, CKMBINDEX, TROPHS, MYOGLOBIN in the last 72 hours. INR:   Recent Labs     05/25/21 2108   INR 1.1     BNP: No results for input(s): PROBNP in the last 72 hours. ABGs: No results for input(s): PH, PCO2, PO2, HCO3, O2SAT in the last 72 hours. Lipids: No results for input(s): CHOL, TRIG, HDL, LDLCALC in the last 72 hours. Invalid input(s): LDL  Pancreatic functions:No results for input(s): LIPASE, AMYLASE in the last 72 hours. Leanora Spell: No results for input(s): LACTA in the last 72 hours. Thyroid functions:   Lab Results   Component Value Date    TSH 2.06 05/25/2021      U/A:  Recent Labs     05/25/21 2136   COLORU YELLOW   WBCUA 20 TO 50   RBCUA 50    MUCUS NOT REPORTED   BACTERIA MODERATE*   SPECGRAV 1.021   LEUKOCYTESUR SMALL*   GLUCOSEU NEGATIVE   AMORPHOUS 1+*       Imaging/Diagonstics:     CT ABDOMEN PELVIS WO CONTRAST Additional Contrast? None    Result Date: 5/25/2021  EXAMINATION: CT OF THE ABDOMEN AND PELVIS WITHOUT CONTRAST 5/25/2021 10:37 pm TECHNIQUE: CT of the abdomen and pelvis was performed without the administration of intravenous contrast. Multiplanar reformatted images are provided for review.  Dose modulation, iterative reconstruction, and/or weight based adjustment of the mA/kV was utilized to reduce the radiation dose to as low as reasonably achievable. COMPARISON: None. HISTORY: ORDERING SYSTEM PROVIDED HISTORY: hematuria UTI AMS evaluation for stone TECHNOLOGIST PROVIDED HISTORY: hematuria UTI AMS evaluation for stone Decision Support Exception - unselect if not a suspected or confirmed emergency medical condition->Emergency Medical Condition (MA) Reason for Exam: Hematuria, UTI Acuity: Unknown Type of Exam: Unknown Relevant Medical/Surgical History: type 2 diabetes, gout, coronary bypass surgery FINDINGS: Lower Chest: Prior sternal splitting procedure for CABG. Coronary artery calcifications and coronary artery stents. The heart is mildly enlarged. Lung bases are clear. Organs: There are no calcified gallstones. No pericholecystic fluid or fat stranding. The liver, spleen, left adrenal gland and left kidney are grossly normal with the limitations of a noncontrast study. 1.6 cm fat attenuation right adrenal gland nodule most consistent with a myelolipoma. The right kidney is mildly atrophic. There is a nonobstructing calculus measuring up to 1.5 cm in the right kidney. No ureteral calculus. GI/Bowel: The appendix is normal.  Mild sigmoid colon diverticulosis with no evidence of diverticulitis. Unopacified bowel loops are otherwise unremarkable. No bowel obstruction. Pelvis: Uterus, ovaries and urinary bladder are grossly normal.  Margins of the right ovary are not visualized due to adjacent unopacified bowel. Peritoneum/Retroperitoneum: Calcific aorto iliac atherosclerotic disease. No abdominal aortic aneurysm. There is no adenopathy, free air or free fluid. Bones/Soft Tissues: Multilevel degenerative changes in the lumbar spine. There is a severe central canal stenosis at L4-L5 secondary to degenerative disc disease and facet arthropathy. 1.5 cm nonobstructing right renal calculus. No acute obstructive uropathy.  1.6 cm fat of right middle cranial fossa is hardly visualized on this noncontrast CT examination. There is no acute infarction, intracranial hemorrhage or intraparenchymal mass lesion. No mass effect, midline shift or extra-axial collection is noted. There are mild nonspecific foci of periventricular and subcortical cerebral white matter hypodensity, most likely representing chronic microangiopathic disease in this age group. Old lacunar infarction of right caudate head and left cerebellar hemisphere. Brain parenchyma is otherwise otherwise normal. The cerebellar tonsils are in normal position. The ventricles, sulci, and cisterns are mildly prominent suggestive of mild generalized volume loss. The globes and orbits are within normal limits. The visualized extracranial structures including paranasal sinuses and mastoid air cells are unremarkable. No fracture is identified. Chest radiograph: The cardiomediastinal silhouette and hilar contours are normal.  There is unchanged mildly increased parahilar interstitial marking. The lungs are otherwise clear with no focal consolidation, pleural effusion or pneumothorax. The overlying soft tissue and osseous structures do not demonstrate acute abnormality. Head CT: 2.3 cm meningioma of the floor of right middle cranial fossa is hardly visualized on this noncontrast CT examination. No evidence for acute intracranial hemorrhage, territorial infarction or intraparenchymal mass lesion. Old lacunar infarction of right caudate head and left cerebellar hemisphere, unchanged. Mild chronic microangiopathic ischemic disease. Mild generalized volume loss. Chest radiograph: No acute intrathoracic pathology. Unchanged mildly increased parahilar interstitial markings.      ASSESSMENT  and  PLAN     Principal Problem:    Cellulitis of both lower extremities  Active Problems:    Venous insufficiency of both lower extremities    Type 2 diabetes mellitus with stage 3 chronic kidney disease, without long-term current use of insulin (HCC)    Iron deficiency anemia    Stage 3 chronic kidney disease    Chronic diastolic CHF (congestive heart failure) (HCC)    Chronic obstructive pulmonary disease (HCC)    Paroxysmal atrial fibrillation (HCC)    UTI (urinary tract infection)    Hypomagnesemia    Kidney stone    Confusion    Fungal rash of torso  Resolved Problems:    * No resolved hospital problems. *    Plan:    Cellulitis of both lower extremities  -WBC 9.7  -Temp 97.5  -Patient started on vancomycin in the ED. Continue upon admission    UTI  -Urinalysis shows 20-50 WBCs,  RBCs, small leuks, urine sent for culture  -Patient started on IV ciprofloxacin in the ED  -Ciprofloxacin 400 mg IVPB every 12 hours  -[email or web address] ml/hr    Confusion  -CT head shows no evidence for acute intracranial hemorrhage, territorial infarction or intraparenchymal mass lesion. 2.3 cm meningioma of the floor of right middle cranial fossa is hardly visualized on this noncontrast CT examination. Old lacunar infarction of right caudate head and left cerebellar hemisphere, unchanged.  -Sodium 136  -Confusion likely due to UTI or lower extremity cellulitis    Diabetes mellitus  -Glucose 138  -Medium dose insulin sliding scale  -AC and HS checks  -Diabetic diet    Proximal atrial fibrillation  -EKG shows normal sinus rhythm with a rate of 86  -Eliquis 5 mg twice daily  -Plavix 75 mg daily    Hypomagnesemia  -Magnesium 1.5  -Patient given 2 g magnesium sulfate in the ED  -Daily magnesium level check    Hypertension  -Blood pressure range 168//69  -Hydrochlorothiazide 25 mg daily  -Aldactone 25 mg daily  -Lopressor 50 mg twice daily    CHF  -Chest x-ray shows unchanged mildly increased parahilar interstitial marking. The lungs are otherwise clear with no focal consolidation, pleural effusion or pneumothorax. Kidney stone  -CT abdomen pelvis shows 1.5 cm nonobstructing right renal calculus.   No acute obstructive uropathy. COPD  -SPO2 100% on room air  -Albuterol as needed    Iron deficiency anemia  -Hemoglobin 11.9    CKD  -Creatinine 1.29, BUN 37    Fungal rash torso  -Nurse reports moist erythematous rash under the patient's breast  -Nystatin powder ordered    Patient's daughter states she has had increased difficulty taking care of the patient at home. Consult social work for discharge planning    DVT prophylaxis-patient on Eliquis    Consultations:     S[address]inder [address] TO SOCIAL WORK      MAL Serrano - CNP   5/26/2021  2:46 AM    [address]  [facility]  [address]    Phone [phone]     Agree with H&P, recorded by Ami Joseph  Patient admitted with daughter noticing increased hallucination  History of coronary artery disease s/p CABG, atrial fibrillation on anticoagulation, hypertension, diabetes  Patient had CT head which was negative  CT abdomen was done concerning for 1.5 cm nonobstructive right renal calculus, patient will follow with urologist as outpatient  Since patient does not have any abdominal pain  Concern for UTI, started on Cipro final culture sent elevated  Patient has chronic stasis dermatitis for last 20 years, has redness of both legs, she mentioned that her legs are always look like they are looking today  Advised to keep leg elevated, advised to use Ace wraps  Has history of heart failure with preserved ejection fraction, will discontinue fluids  I do not believe she has cellulitis I think it is more of stasis dermatitis, will discontinue vancomycin  Readings of uncontrolled hypertension adding Norvasc, I wanted to start patient on ACE inhibitor with history of diabetes, unfortunately she is allergic to lisinopril and she does not remember what kind of allergy she has will avoid ACE/ARB with history of allergy  Adding Norvasc  Patient has 4+ proteinuria, ordering urine protein creatinine ratio

## 2021-05-26 NOTE — ED PROVIDER NOTES
EMERGENCY DEPARTMENT ENCOUNTER    Pt Name: Melody Mason  MRN: [id number]  [place] 1950  Date of evaluation: 5/25/21  CHIEF COMPLAINT       Chief Complaint   Patient presents with    Cellulitis    Hallucinations     HISTORY OF PRESENT ILLNESS   HPI     This is a 69-year-old female with a history of CAD atrial fibrillation on aspirin Plavix and Eliquis who comes in today. Patient states that she has no complaints at this time she would be home if it was her choice. History is unable to be completely obtained because the patient has altered mental status. Per the daughter, the patient has been hallucinating over the past week she lives with her son who has been increasingly helping take care of her she has recurrent infections of her lower legs and her legs appear more red than they normally are but they are chronically red. She has a history of urinary tract infections and gets confused when she has UTIs. No known falls.     REVIEW OF SYSTEMS     Review of Systems unable to be completely obtained secondary to altered mental status  PASTMEDICAL HISTORY     Past Medical History:   Diagnosis Date    Acute CVA (cerebrovascular accident) ([address] 7/25/2020    Arthritis     Brain mass     Cellulitis and abscess     Cellulitis and abscess of unspecified site     Cellulitis of left lower extremity 7/26/2020    Cellulitis of lower extremity 10/4/2020    CHF (congestive heart failure) (HCC)     Chronic kidney disease, unspecified     Coronary atherosclerosis of native coronary artery     Essential hypertension 7/25/2020    Essential hypertension, malignant     Hallucinations 2/18/2021    Hard of hearing     Head contusion 9/9/2020    Hypokalemia 9/9/2020    Iron deficiency anemia, unspecified     Myocardial infarction ([address]     Other and unspecified hyperlipidemia     Pure hypercholesterolemia     Toxic metabolic encephalopathy 2/21/6619    Type II or unspecified type diabetes mellitus without mention of complication, not stated as uncontrolled     Unspecified asthma(493.90)     Unspecified venous (peripheral) insufficiency     Unspecified vitamin D deficiency     UTI (urinary tract infection) 2/18/2021     SURGICAL HISTORY       Past Surgical History:   Procedure Laterality Date    CORONARY ARTERY BYPASS GRAFT      x 3, Dr. Fernando Gonsales       Previous Medications    ALBUTEROL SULFATE HFA (PROAIR HFA) 108 (90 BASE) MCG/ACT INHALER    Inhale 2 puffs into the lungs every 6 hours as needed for Wheezing or Shortness of Breath (cough)    ALLOPURINOL (ZYLOPRIM) 300 MG TABLET    Take 1 tablet by mouth daily Per rheumatologist    APIXABAN (ELIQUIS) 5 MG TABS TABLET    Take 1 tablet by mouth 2 times daily    ATORVASTATIN (LIPITOR) 40 MG TABLET    Take 1 tablet by mouth daily    BLOOD GLUCOSE MONITOR KIT AND SUPPLIES    1 kit by Other route three times daily Dispense Butterfly Elite CBG Device    BLOOD GLUCOSE TEST STRIPS (FREESTYLE LITE) STRIP    1 each by In Vitro route daily As needed.     BLOOD PRESSURE KIT    1 kit by Does not apply route three times daily    CLOPIDOGREL (PLAVIX) 75 MG TABLET    Take 1 tablet by mouth daily    DESLORATADINE (CLARINEX) 5 MG TABLET    Take 1 tablet by mouth daily    FREESTYLE LANCETS MISC    1 each by Does not apply route daily Patient needs to contact office before any further refills will be approved    HYDROCHLOROTHIAZIDE (HYDRODIURIL) 25 MG TABLET    Take 1 tablet by mouth every morning (before breakfast) Water pill    MAGNESIUM OXIDE (MAG-OX) 400 MG TABLET    Take 1 tablet by mouth 2 times daily    METFORMIN (GLUCOPHAGE) 500 MG TABLET    Take 1 tablet by mouth 2 times daily (with meals)    METOPROLOL TARTRATE (LOPRESSOR) 50 MG TABLET    Take 1 tablet by mouth 2 times daily    MULTIPLE VITAMINS-MINERALS (THERAPEUTIC MULTIVITAMIN-MINERALS) TABLET    Take 1 tablet by mouth daily    SODIUM BICARBONATE 650 MG TABLET Take 1 tablet by mouth 3 times daily    SPIRONOLACTONE (ALDACTONE) 25 MG TABLET    Take 1 tablet by mouth every morning (before breakfast) Water pill    VITAMIN D (ERGOCALCIFEROL) 1.25 MG (04408 UT) CAPS CAPSULE    Take 1 capsule by mouth once a week     ALLERGIES     is allergic to aleve [naproxen sodium], pioglitazone, claritin [loratadine], keflex [cephalexin], and lisinopril. FAMILY HISTORY     She indicated that the status of her mother is unknown. She indicated that the status of her father is unknown. She indicated that the status of her sister is unknown. She indicated that the status of her maternal grandmother is unknown. SOCIAL HISTORY       Social History     Tobacco Use    Smoking status: Former Smoker     Packs/day: 0.25     Years: 10.00     Pack years: 2.50     Quit date: 2000     Years since quittin.4    Smokeless tobacco: Never Used   Vaping Use    Vaping Use: Never used   Substance Use Topics    Alcohol use: Not Currently     Alcohol/week: 0.0 standard drinks    Drug use: No     PHYSICAL EXAM     INITIAL VITALS: BP (!) 168/55   Pulse 80   Temp 98.1 °F (36.7 °C) (Oral)   Resp 16   Ht 5' 1\" (1.549 m)   Wt 145 lb (65.8 kg)   SpO2 100%   BMI 27.40 kg/m²    Physical Exam  Vitals and nursing note reviewed. Constitutional:       General: She is not in acute distress. Appearance: She is well-developed. HENT:      Head: Normocephalic and atraumatic. Eyes:      Conjunctiva/sclera: Conjunctivae normal.   Cardiovascular:      Rate and Rhythm: Normal rate and regular rhythm. Heart sounds: No murmur heard. No friction rub. Pulmonary:      Effort: Pulmonary effort is normal. No respiratory distress. Breath sounds: Normal breath sounds. No stridor. No wheezing or rhonchi. Abdominal:      General: There is no distension. Palpations: Abdomen is soft. Tenderness: There is no abdominal tenderness. There is no guarding or rebound.    Musculoskeletal: Cervical back: Neck supple. Comments: Bilateral lower extremity edema with erythema in the lower legs with mild tenderness to palpation no calf tenderness to palpation some seeping of the right leg but no purulence   Skin:     General: Skin is warm and dry. Capillary Refill: Capillary refill takes less than 2 seconds. Neurological:      Mental Status: She is alert. DIAGNOSTIC RESULTS   RADIOLOGY:All plain film, CT, MRI, and formal ultrasound images (except ED bedside ultrasound) are read by the radiologist, see reports below, unless otherwisenoted in MDM or here. CT ABDOMEN PELVIS WO CONTRAST Additional Contrast? None   Final Result   1.5 cm nonobstructing right renal calculus. No acute obstructive uropathy. 1.6 cm fat attenuation right adrenal gland nodule consistent with a   myelolipoma. Mild sigmoid colon diverticulosis. Urinary bladder is grossly normal.  Light of the history of hematuria,   follow-up cystoscopy may be helpful. Severe central canal stenosis at L4-L5. XR CHEST (2 VW)   Final Result      Head CT:      2.3 cm meningioma of the floor of right middle cranial fossa is hardly   visualized on this noncontrast CT examination. No evidence for acute intracranial hemorrhage, territorial infarction or   intraparenchymal mass lesion. Old lacunar infarction of right caudate head and left cerebellar hemisphere,   unchanged. Mild chronic microangiopathic ischemic disease. Mild generalized volume loss. Chest radiograph:      No acute intrathoracic pathology. Unchanged mildly increased parahilar   interstitial markings. CT HEAD WO CONTRAST   Final Result      Head CT:      2.3 cm meningioma of the floor of right middle cranial fossa is hardly   visualized on this noncontrast CT examination. No evidence for acute intracranial hemorrhage, territorial infarction or   intraparenchymal mass lesion.       Old lacunar infarction of right caudate head and left cerebellar hemisphere,   unchanged. Mild chronic microangiopathic ischemic disease. Mild generalized volume loss. Chest radiograph:      No acute intrathoracic pathology. Unchanged mildly increased parahilar   interstitial markings. LABS: All lab results were reviewed by myself, and all abnormals are listed below. Labs Reviewed   CBC WITH AUTO DIFFERENTIAL - Abnormal; Notable for the following components:       Result Value    RBC 3.91 (*)     Hemoglobin 11.9 (*)     RDW 16.4 (*)     Seg Neutrophils 71 (*)     Lymphocytes 17 (*)     Monocytes 9 (*)     All other components within normal limits   MAGNESIUM - Abnormal; Notable for the following components:    Magnesium 1.5 (*)     All other components within normal limits   COMPREHENSIVE METABOLIC PANEL - Abnormal; Notable for the following components:    Glucose 138 (*)     BUN 37 (*)     CREATININE 1.29 (*)     CO2 19 (*)     Total Protein 6.3 (*)     GFR Non- 41 (*)     GFR  49 (*)     All other components within normal limits   URINALYSIS - Abnormal; Notable for the following components:    Turbidity UA CLOUDY (*)     Urine Hgb LARGE (*)     Protein, UA 4+ (*)     Leukocyte Esterase, Urine SMALL (*)     All other components within normal limits   MICROSCOPIC URINALYSIS - Abnormal; Notable for the following components:    Bacteria, UA MODERATE (*)     Amorphous, UA 1+ (*)     All other components within normal limits   CULTURE, URINE   APTT   PROTIME-INR   TSH WITH REFLEX       EMERGENCY DEPARTMENTCOURSE:   Differential diagnosis includes urinary tract infection pneumonia intracranial bleed electrolyte abnormality.   Patient clinically appears well however she is hallucinating per family member she is not hallucinating in the emergency department will perform altered mental status work-up    11:58 PM EDT  CT head is negative for acute process patient has a meningioma that is known per the daughter. No worsening of this meningioma. Creatinine is worse from 0.95-1. 29. Her urine reveals infection but also has RBCs for which I performed a CT abdomen which revealed a 1.5 cm nonobstructing right renal calculus with no acute obstructive uropathy. The patient has a cephalosporin allergy and so Cipro was ordered. For her lower extremity cellulitis vancomycin was ordered. She has no white count however given her altered mental status urinary tract infection and cellulitis will admit I did speak to [place] [id number] practitioner who will admit the patient       EKG:All EKG's are interpreted by the Emergency Department Physician who either signs or Co-signs this chart in the absence of a cardiologist.  Sinus rhythm with PVCs heart rate 86 bpm no prolonged intervals normal axis no acute ST or T wave change      Vitals:    Vitals:    05/25/21 2010 05/25/21 2013   BP:  (!) 168/55   Pulse:  80   Resp:  16   Temp:  98.1 °F (36.7 °C)   TempSrc:  Oral   SpO2:  100%   Weight: 145 lb (65.8 kg)    Height: 5' 1\" (1.549 m)        The patient was given the following medications while in the emergency department:  Orders Placed This Encounter   Medications    magnesium sulfate 2,000 mg in dextrose 5 % 100 mL IVPB    ciprofloxacin (CIPRO) IVPB 400 mg     Order Specific Question:   Antimicrobial Indications     Answer:   Urinary Tract Infection         FINAL IMPRESSION      1. Cellulitis, unspecified cellulitis site    2. Urinary tract infection with hematuria, site unspecified    3.  Kidney stone         DISPOSITION/PLAN   DISPOSITION Decision To Admit 05/25/2021 11:57:53 PM  Jenniffer Turner MD  Attending Emergency Physician    This charting supersedes any ED resident or staff charting and was written using speech recognition Joe Schrader MD  05/26/21 0000

## 2021-05-26 NOTE — PLAN OF CARE
Problem: Falls - Risk of:  Goal: Will remain free from falls  Description: Will remain free from falls  Outcome: Ongoing  Goal: Absence of physical injury  Description: Absence of physical injury  Outcome: Ongoing     Problem:  Activity:  Goal: Ability to tolerate increased activity will improve  Description: Ability to tolerate increased activity will improve  Outcome: Ongoing     Problem: Cardiac:  Goal: Hemodynamic stability will improve  Description: Hemodynamic stability will improve  Outcome: Ongoing     Problem: Coping:  Goal: Ability to identify and develop effective coping behavior will improve  Description: Ability to identify and develop effective coping behavior will improve  Outcome: Ongoing     Problem: Health Behavior:  Goal: Identification of resources available to assist in meeting health care needs will improve  Description: Identification of resources available to assist in meeting health care needs will improve  Outcome: Ongoing

## 2021-05-27 VITALS
TEMPERATURE: 97.8 F | WEIGHT: 137.57 LBS | HEIGHT: 61 IN | DIASTOLIC BLOOD PRESSURE: 56 MMHG | RESPIRATION RATE: 19 BRPM | HEART RATE: 88 BPM | BODY MASS INDEX: 25.97 KG/M2 | SYSTOLIC BLOOD PRESSURE: 150 MMHG | OXYGEN SATURATION: 100 %

## 2021-05-27 LAB
ANION GAP SERPL CALCULATED.3IONS-SCNC: 15 MMOL/L (ref 9–17)
BUN BLDV-MCNC: 27 MG/DL (ref 8–23)
BUN/CREAT BLD: ABNORMAL (ref 9–20)
CALCIUM SERPL-MCNC: 9.8 MG/DL (ref 8.6–10.4)
CHLORIDE BLD-SCNC: 105 MMOL/L (ref 98–107)
CO2: 20 MMOL/L (ref 20–31)
CREAT SERPL-MCNC: 1.06 MG/DL (ref 0.5–0.9)
CULTURE: ABNORMAL
GFR AFRICAN AMERICAN: >60 ML/MIN
GFR NON-AFRICAN AMERICAN: 51 ML/MIN
GFR SERPL CREATININE-BSD FRML MDRD: ABNORMAL ML/MIN/{1.73_M2}
GFR SERPL CREATININE-BSD FRML MDRD: ABNORMAL ML/MIN/{1.73_M2}
GLUCOSE BLD-MCNC: 113 MG/DL (ref 65–105)
GLUCOSE BLD-MCNC: 128 MG/DL (ref 65–105)
GLUCOSE BLD-MCNC: 145 MG/DL (ref 65–105)
GLUCOSE BLD-MCNC: 165 MG/DL (ref 70–99)
HCT VFR BLD CALC: 40.9 % (ref 36–46)
HEMOGLOBIN: 13.2 G/DL (ref 12–16)
Lab: ABNORMAL
MAGNESIUM: 1.7 MG/DL (ref 1.6–2.6)
MCH RBC QN AUTO: 30 PG (ref 26–34)
MCHC RBC AUTO-ENTMCNC: 32.4 G/DL (ref 31–37)
MCV RBC AUTO: 92.6 FL (ref 80–100)
NRBC AUTOMATED: ABNORMAL PER 100 WBC
PDW BLD-RTO: 16.7 % (ref 11.5–14.9)
PLATELET # BLD: 230 K/UL (ref 150–450)
PMV BLD AUTO: 9.5 FL (ref 6–12)
POTASSIUM SERPL-SCNC: 3.8 MMOL/L (ref 3.7–5.3)
RBC # BLD: 4.41 M/UL (ref 4–5.2)
SODIUM BLD-SCNC: 140 MMOL/L (ref 135–144)
SPECIMEN DESCRIPTION: ABNORMAL
WBC # BLD: 12.6 K/UL (ref 3.5–11)

## 2021-05-27 PROCEDURE — 85027 COMPLETE CBC AUTOMATED: CPT

## 2021-05-27 PROCEDURE — 6370000000 HC RX 637 (ALT 250 FOR IP): Performed by: NURSE PRACTITIONER

## 2021-05-27 PROCEDURE — 82947 ASSAY GLUCOSE BLOOD QUANT: CPT

## 2021-05-27 PROCEDURE — 36415 COLL VENOUS BLD VENIPUNCTURE: CPT

## 2021-05-27 PROCEDURE — 6360000002 HC RX W HCPCS: Performed by: NURSE PRACTITIONER

## 2021-05-27 PROCEDURE — 2580000003 HC RX 258: Performed by: NURSE PRACTITIONER

## 2021-05-27 PROCEDURE — 6370000000 HC RX 637 (ALT 250 FOR IP): Performed by: INTERNAL MEDICINE

## 2021-05-27 PROCEDURE — 80048 BASIC METABOLIC PNL TOTAL CA: CPT

## 2021-05-27 PROCEDURE — 99239 HOSP IP/OBS DSCHRG MGMT >30: CPT | Performed by: INTERNAL MEDICINE

## 2021-05-27 PROCEDURE — 83735 ASSAY OF MAGNESIUM: CPT

## 2021-05-27 RX ORDER — AMLODIPINE BESYLATE 5 MG/1
5 TABLET ORAL DAILY
Qty: 30 TABLET | Refills: 3 | Status: SHIPPED | OUTPATIENT
Start: 2021-05-28 | End: 2021-06-29 | Stop reason: ALTCHOICE

## 2021-05-27 RX ORDER — CLINDAMYCIN HYDROCHLORIDE 150 MG/1
300 CAPSULE ORAL EVERY 8 HOURS SCHEDULED
Status: CANCELLED | OUTPATIENT
Start: 2021-05-27 | End: 2021-06-01

## 2021-05-27 RX ORDER — CLINDAMYCIN HYDROCHLORIDE 300 MG/1
300 CAPSULE ORAL 3 TIMES DAILY
Qty: 21 CAPSULE | Refills: 0 | Status: SHIPPED | OUTPATIENT
Start: 2021-05-27 | End: 2021-06-03

## 2021-05-27 RX ADMIN — INSULIN LISPRO 2 UNITS: 100 INJECTION, SOLUTION INTRAVENOUS; SUBCUTANEOUS at 08:06

## 2021-05-27 RX ADMIN — ANTI-FUNGAL POWDER MICONAZOLE NITRATE TALC FREE: 1.42 POWDER TOPICAL at 13:16

## 2021-05-27 RX ADMIN — SODIUM BICARBONATE 650 MG: 650 TABLET ORAL at 08:06

## 2021-05-27 RX ADMIN — APIXABAN 5 MG: 5 TABLET, FILM COATED ORAL at 08:06

## 2021-05-27 RX ADMIN — SODIUM BICARBONATE 650 MG: 650 TABLET ORAL at 16:15

## 2021-05-27 RX ADMIN — SPIRONOLACTONE 25 MG: 25 TABLET, FILM COATED ORAL at 05:42

## 2021-05-27 RX ADMIN — Medication 10 ML: at 13:16

## 2021-05-27 RX ADMIN — ALLOPURINOL 300 MG: 300 TABLET ORAL at 08:56

## 2021-05-27 RX ADMIN — MULTIPLE VITAMINS W/ MINERALS TAB 1 TABLET: TAB at 08:06

## 2021-05-27 RX ADMIN — METOPROLOL TARTRATE 50 MG: 50 TABLET, FILM COATED ORAL at 08:06

## 2021-05-27 RX ADMIN — CLOPIDOGREL BISULFATE 75 MG: 75 TABLET ORAL at 08:06

## 2021-05-27 RX ADMIN — HYDRALAZINE HYDROCHLORIDE 10 MG: 20 INJECTION INTRAMUSCULAR; INTRAVENOUS at 00:35

## 2021-05-27 RX ADMIN — HYDROCHLOROTHIAZIDE 25 MG: 25 TABLET ORAL at 05:42

## 2021-05-27 RX ADMIN — CIPROFLOXACIN 400 MG: 2 INJECTION, SOLUTION INTRAVENOUS at 13:11

## 2021-05-27 RX ADMIN — AMLODIPINE BESYLATE 5 MG: 5 TABLET ORAL at 08:06

## 2021-05-27 RX ADMIN — CIPROFLOXACIN 400 MG: 2 INJECTION, SOLUTION INTRAVENOUS at 00:28

## 2021-05-27 ASSESSMENT — PAIN SCALES - GENERAL: PAINLEVEL_OUTOF10: 0

## 2021-05-27 NOTE — FLOWSHEET NOTE
05/27/21 1911   Encounter Summary   Services provided to: Patient; Family   Referral/Consult From: Rounding   Support System Children   Continue Visiting   (05/27/2021)   Complexity of Encounter Low   Length of Encounter 15 minutes   Spiritual Assessment Completed Yes   Routine   Type Initial   Assessment Calm; Approachable;Passive   Intervention Active listening;Sustaining presence/ Ministry of presence   Outcome Expressed gratitude;Comfort

## 2021-05-27 NOTE — PLAN OF CARE
Problem: Falls - Risk of:  Goal: Will remain free from falls  Description: Will remain free from falls  5/27/2021 0353 by Chirag Reyes RN  Outcome: Met This Shift  No falls noted this shift. Patient ambulates with x1 staff assistance without difficulty. Bed kept in low position. Safe environment maintained. Bedside table & call light in reach. Uses call light appropriately when needing assistance.        Problem: Cardiac:  Goal: Hemodynamic stability will improve  Description: Hemodynamic stability will improve  5/27/2021 0353 by Chirag Reyes RN  Outcome: Ongoing   Patients BP still remains geraldine      Problem: Coping:  Goal: Ability to identify and develop effective coping behavior will improve  Description: Ability to identify and develop effective coping behavior will improve  5/27/2021 0353 by hCirag Reyes RN  Outcome: Ongoing  Patient is still very anxious and confused at times during the night

## 2021-05-27 NOTE — DISCHARGE SUMMARY
[place] [address] Internal Medicine    Discharge Summary     Patient ID: Danya Pastor  :     MRN: [id number]     ACCOUNT:  [de-identified]   Patient's PCP: Tamara Ceja MD  Admit Date: 2021   Discharge Date: 2021    Length of Stay: 1  Code Status:  Full Code  Admitting Physician: Zheng Dolan MD  Discharge Physician: Zheng Dolan MD     Active Discharge Diagnoses:     Primary Problem  Cellulitis of both lower extremities      [place] Problems    Diagnosis Date Noted    Cellulitis of both lower extremities [L03.115, E13.689] 2021    Hypomagnesemia [E83.42] 2021    Kidney stone [N20.0] 2021    Confusion [R41.0] 2021    Fungal rash of torso [B36.9] 2021    UTI (urinary tract infection) [N39.0] 2021    Paroxysmal atrial fibrillation ([facility] [I48.0] 2020    Chronic obstructive pulmonary disease ([facility] [J44.9] 2020    Chronic diastolic CHF (congestive heart failure) ([facility] [I50.32] 2020    HTN.  Benign hypertension with CKD (chronic kidney disease) stage III (HCC) [I12.9, N18.30] 2020    Type 2 diabetes mellitus with stage 3 chronic kidney disease, without long-term current use of insulin (HCC) [E11.22, N18.30]     Stage 3 chronic kidney disease [N18.30]     Venous insufficiency of both lower extremities [I87.2]     Iron deficiency anemia [D50.9]        Admission Condition:  fair     Discharged Condition: fair    Hospital Stay:     Hospital Course:  Danya Pastor is a 69 y.o. female who was admitted for the management of Cellulitis of both lower extremities , presented to ER with Cellulitis and Hallucinations  Patient has past medical history of coronary artery disease s/p CABG, hypertension, atrial fibrillation on anticoagulation, she has chronic stasis dermatitis in both legs, admitted since her family feel that she has altered mentation, she was found to have UTI, initially treated with broad-spectrum antibiotic, I think her redness in legs is unchanged and she has stasis dermatitis, urine culture was positive for beta-hemolytic streptococci  Patient does not want to go to nursing home, does not want to have home health aide  She is very anxious to leave  Patient is allergic to Keflex  Discussed with pharmacist, discharging patient on clindamycin          Significant therapeutic interventions:     Significant Diagnostic Studies:   Labs / Micro:        ,     Radiology:    CT ABDOMEN PELVIS WO CONTRAST Additional Contrast? None    Result Date: 5/25/2021  EXAMINATION: CT OF THE ABDOMEN AND PELVIS WITHOUT CONTRAST 5/25/2021 10:37 pm TECHNIQUE: CT of the abdomen and pelvis was performed without the administration of intravenous contrast. Multiplanar reformatted images are provided for review. Dose modulation, iterative reconstruction, and/or weight based adjustment of the mA/kV was utilized to reduce the radiation dose to as low as reasonably achievable. COMPARISON: None. HISTORY: ORDERING SYSTEM PROVIDED HISTORY: hematuria UTI AMS evaluation for stone TECHNOLOGIST PROVIDED HISTORY: hematuria UTI AMS evaluation for stone Decision Support Exception - unselect if not a suspected or confirmed emergency medical condition->Emergency Medical Condition (MA) Reason for Exam: Hematuria, UTI Acuity: Unknown Type of Exam: Unknown Relevant Medical/Surgical History: type 2 diabetes, gout, coronary bypass surgery FINDINGS: Lower Chest: Prior sternal splitting procedure for CABG. Coronary artery calcifications and coronary artery stents. The heart is mildly enlarged. Lung bases are clear. Organs: There are no calcified gallstones. No pericholecystic fluid or fat stranding. The liver, spleen, left adrenal gland and left kidney are grossly normal with the limitations of a noncontrast study. 1.6 cm fat attenuation right adrenal gland nodule most consistent with a myelolipoma.   The right throughout scan, repeat imaging done due to motion artifact Acuity: Unknown Type of Exam: Unknown FINDINGS: Head CT: 2.3 cm meningioma of the floor of right middle cranial fossa is hardly visualized on this noncontrast CT examination. There is no acute infarction, intracranial hemorrhage or intraparenchymal mass lesion. No mass effect, midline shift or extra-axial collection is noted. There are mild nonspecific foci of periventricular and subcortical cerebral white matter hypodensity, most likely representing chronic microangiopathic disease in this age group. Old lacunar infarction of right caudate head and left cerebellar hemisphere. Brain parenchyma is otherwise otherwise normal. The cerebellar tonsils are in normal position. The ventricles, sulci, and cisterns are mildly prominent suggestive of mild generalized volume loss. The globes and orbits are within normal limits. The visualized extracranial structures including paranasal sinuses and mastoid air cells are unremarkable. No fracture is identified. Chest radiograph: The cardiomediastinal silhouette and hilar contours are normal.  There is unchanged mildly increased parahilar interstitial marking. The lungs are otherwise clear with no focal consolidation, pleural effusion or pneumothorax. The overlying soft tissue and osseous structures do not demonstrate acute abnormality. Head CT: 2.3 cm meningioma of the floor of right middle cranial fossa is hardly visualized on this noncontrast CT examination. No evidence for acute intracranial hemorrhage, territorial infarction or intraparenchymal mass lesion. Old lacunar infarction of right caudate head and left cerebellar hemisphere, unchanged. Mild chronic microangiopathic ischemic disease. Mild generalized volume loss. Chest radiograph: No acute intrathoracic pathology. Unchanged mildly increased parahilar interstitial markings.      CT HEAD WO CONTRAST    Result Date: 5/25/2021  EXAMINATION: CT OF THE HEAD WITHOUT CONTRAST; TWO XRAY VIEWS OF THE CHEST  5/25/2021 9:39 pm TECHNIQUE: CT of the head was performed without the administration of intravenous contrast. Dose modulation, iterative reconstruction, and/or weight based adjustment of the mA/kV was utilized to reduce the radiation dose to as low as reasonably achievable. COMPARISON: Brain MRI of 02/18/2021 and chest radiograph of 217 HISTORY: ORDERING SYSTEM PROVIDED HISTORY: BANEGAS yesterday hallucinations AMS workup TECHNOLOGIST PROVIDED HISTORY: BANEGSA yesterday hallucinations AMS workup Decision Support Exception - unselect if not a suspected or confirmed emergency medical condition->Emergency Medical Condition (MA) Reason for Exam: Altered mental status, patient moving her head throughout scan, repeat imaging done due to motion artifact Acuity: Unknown Type of Exam: Unknown FINDINGS: Head CT: 2.3 cm meningioma of the floor of right middle cranial fossa is hardly visualized on this noncontrast CT examination. There is no acute infarction, intracranial hemorrhage or intraparenchymal mass lesion. No mass effect, midline shift or extra-axial collection is noted. There are mild nonspecific foci of periventricular and subcortical cerebral white matter hypodensity, most likely representing chronic microangiopathic disease in this age group. Old lacunar infarction of right caudate head and left cerebellar hemisphere. Brain parenchyma is otherwise otherwise normal. The cerebellar tonsils are in normal position. The ventricles, sulci, and cisterns are mildly prominent suggestive of mild generalized volume loss. The globes and orbits are within normal limits. The visualized extracranial structures including paranasal sinuses and mastoid air cells are unremarkable. No fracture is identified. Chest radiograph: The cardiomediastinal silhouette and hilar contours are normal.  There is unchanged mildly increased parahilar interstitial marking.   The lungs are otherwise clear with no clopidogrel 75 MG tablet  Commonly known as: PLAVIX  Take 1 tablet by mouth daily     desloratadine 5 MG tablet  Commonly known as: Clarinex  Take 1 tablet by mouth daily     FreeStyle Lancets Misc  1 each by Does not apply route daily Patient needs to contact office before any further refills will be approved     FREESTYLE LITE strip  Generic drug: blood glucose test strips  1 each by In Vitro route daily As needed. hydroCHLOROthiazide 25 MG tablet  Commonly known as: HYDRODIURIL  Take 1 tablet by mouth every morning (before breakfast) Water pill     magnesium oxide 400 MG tablet  Commonly known as: MAG-OX  Take 1 tablet by mouth 2 times daily     metFORMIN 500 MG tablet  Commonly known as: GLUCOPHAGE  Take 1 tablet by mouth 2 times daily (with meals)     metoprolol tartrate 50 MG tablet  Commonly known as: LOPRESSOR  Take 1 tablet by mouth 2 times daily     sodium bicarbonate 650 MG tablet  Take 1 tablet by mouth 3 times daily     spironolactone 25 MG tablet  Commonly known as: ALDACTONE  Take 1 tablet by mouth every morning (before breakfast) Water pill     therapeutic multivitamin-minerals tablet     vitamin D 1.25 MG (49352 UT) Caps capsule  Commonly known as: ERGOCALCIFEROL  Take 1 capsule by mouth once a week            Time Spent on discharge is  35 mins in patient examination, evaluation, counseling as well as medication reconciliation, prescriptions for required medications, discharge plan and follow up. Electronically signed by   Christo Martini MD  5/27/2021  5:33 PM      Thank you Dr. Ortega Garcia MD for the opportunity to be involved in this patient's care.

## 2021-05-28 NOTE — PROGRESS NOTES
Physician Progress Note      Josué Milian  CSN #:                  [id number]  :                       1950  ADMIT DATE:       2021 8:07 PM  [address] DATE:        2021 7:56 PM  RESPONDING  PROVIDER #:        Ruth Ann Lindsey MD          QUERY TEXT:    Pt admitted with cellulitis. Pt noted to have AMS and hallucinations due to   UTI. If possible, please document below and in the progress notes and   discharge summary if you are evaluating and / or treating any of the   following: The medical record reflects the following:  Risk Factors: UTI  Clinical Indicators: in nsg flow sheet, oriented to name only,  Per the   daughter, the patient has been hallucinating. Confusion likely due to UTI or   lower extremity cellulitis  Treatment: IV ATB, neuro checks and bed alarms. Thank you. Please call if you have any questions. (P) [phone]. Signed   by Parul Kimball RN Clinical , CRCR  Options provided:  -- Metabolic encephalopathy  -- Septic encephalopathy  -- Anoxic encephalopathy  -- Toxic encephalopathy  -- Delirium due to, Please specify cause. -- Delirium  -- Other - I will add my own diagnosis  -- Disagree - Not applicable / Not valid  -- Disagree - Clinically unable to determine / Unknown  -- Refer to Clinical Documentation Reviewer    PROVIDER RESPONSE TEXT:    This patient has metabolic encephalopathy.     Query created by: Kaleigh Lux on 2021 10:40 AM      Electronically signed by:  Ruth Ann Lindsey MD 2021 12:28 PM

## 2021-06-01 ENCOUNTER — OFFICE VISIT (OUTPATIENT)
Dept: UROLOGY | Age: 71
End: 2021-06-01
Payer: MEDICARE

## 2021-06-01 VITALS
DIASTOLIC BLOOD PRESSURE: 63 MMHG | BODY MASS INDEX: 25.86 KG/M2 | SYSTOLIC BLOOD PRESSURE: 114 MMHG | TEMPERATURE: 97.2 F | HEART RATE: 72 BPM | HEIGHT: 61 IN | WEIGHT: 137 LBS

## 2021-06-01 DIAGNOSIS — N39.0 RECURRENT UTI: ICD-10-CM

## 2021-06-01 DIAGNOSIS — N20.0 KIDNEY STONE: Primary | ICD-10-CM

## 2021-06-01 PROCEDURE — G8400 PT W/DXA NO RESULTS DOC: HCPCS | Performed by: UROLOGY

## 2021-06-01 PROCEDURE — 99204 OFFICE O/P NEW MOD 45 MIN: CPT | Performed by: UROLOGY

## 2021-06-01 PROCEDURE — 3017F COLORECTAL CA SCREEN DOC REV: CPT | Performed by: UROLOGY

## 2021-06-01 PROCEDURE — 1090F PRES/ABSN URINE INCON ASSESS: CPT | Performed by: UROLOGY

## 2021-06-01 PROCEDURE — 1111F DSCHRG MED/CURRENT MED MERGE: CPT | Performed by: UROLOGY

## 2021-06-01 PROCEDURE — G8427 DOCREV CUR MEDS BY ELIG CLIN: HCPCS | Performed by: UROLOGY

## 2021-06-01 PROCEDURE — 1123F ACP DISCUSS/DSCN MKR DOCD: CPT | Performed by: UROLOGY

## 2021-06-01 PROCEDURE — 4040F PNEUMOC VAC/ADMIN/RCVD: CPT | Performed by: UROLOGY

## 2021-06-01 PROCEDURE — 1036F TOBACCO NON-USER: CPT | Performed by: UROLOGY

## 2021-06-01 PROCEDURE — G8417 CALC BMI ABV UP PARAM F/U: HCPCS | Performed by: UROLOGY

## 2021-06-01 ASSESSMENT — ENCOUNTER SYMPTOMS
DIARRHEA: 0
CONSTIPATION: 0
NAUSEA: 0
COUGH: 0
SHORTNESS OF BREATH: 0
EYES NEGATIVE: 1
GASTROINTESTINAL NEGATIVE: 1
VOMITING: 0
ABDOMINAL PAIN: 0
WHEEZING: 0
EYE REDNESS: 0
BACK PAIN: 0
EYE PAIN: 0

## 2021-06-01 NOTE — PROGRESS NOTES
[address] [address]  Dept: [address] [facility] Office Note - New Patient    Patient:  Fausto Ojeda  YOB: 1950  Date: 6/1/2021    The patient is a 69 y.o. female who presentstoday for evaluation of the following problems:   Chief Complaint   Patient presents with    Hematuria     new patient- hematuria    referred by Diego Ibarra MD.    HPI  She is here for hematuria. She had a recent Group B strep UTI. She was found to have blood in the urine. She had a CT, which shows a non obstructing stone as well. No previous stone surgery. Her only issue is the recurrent UTIs. (Patient's old records have been requested, reviewed and summarized in today's note.)    Summary of old records: N/A    History: N/A    ProceduresToday: N/A    Urinalysis today:  No results found for this visit on 06/01/21. AUA Symptom Score (6/1/2021):                               Last BUN andcreatinine:  Lab Results   Component Value Date    BUN 27 (H) 05/27/2021     Lab Results   Component Value Date    CREATININE 1.06 (H) 05/27/2021       Additional Lab/Culture results: urine culture group B strep    Reviewed during this Office Visit: CT images reviewed. Large right renal stone noted, no hydro.    (results were independently reviewed byphysician and radiology report verified)    PAST MEDICAL, FAMILY AND SOCIAL HISTORY:  Past Medical History:   Diagnosis Date    Acute CVA (cerebrovascular accident) ([facility] [address] 7/25/2020    Arthritis     Brain mass     Cellulitis and abscess     Cellulitis and abscess of unspecified site     Cellulitis of left lower extremity 7/26/2020    Cellulitis of lower extremity 10/4/2020    CHF (congestive heart failure) (HCC)     Chronic kidney disease, unspecified     Coronary atherosclerosis of native coronary artery     Essential hypertension 7/25/2020    Essential hypertension, malignant     Hallucinations 2/18/2021    Hard of hearing     Head contusion 9/9/2020    Hypokalemia 9/9/2020    Iron deficiency anemia, unspecified     Myocardial infarction ([facility] [address]     Other and unspecified hyperlipidemia     Pure hypercholesterolemia     Toxic metabolic encephalopathy 1/94/1469    Type II or unspecified type diabetes mellitus without mention of complication, not stated as uncontrolled     Unspecified asthma(493.90)     Unspecified venous (peripheral) insufficiency     Unspecified vitamin D deficiency     UTI (urinary tract infection) 2/18/2021     Past Surgical History:   Procedure Laterality Date    CORONARY ARTERY BYPASS GRAFT      x 3, Dr. Ani Ngo       Family History   Problem Relation Age of Onset    Diabetes Mother     Heart Disease Mother     Heart Disease Father     Diabetes Sister     High Blood Pressure Sister     Diabetes Maternal Grandmother      Outpatient Medications Marked as Taking for the 6/1/21 encounter (Office Visit) with Margo Westfall MD   Medication Sig Dispense Refill    amLODIPine (NORVASC) 5 MG tablet Take 1 tablet by mouth daily 30 tablet 3    miconazole (MICOTIN) 2 % powder Apply topically 2 times daily. 45 g 1    clindamycin (CLEOCIN) 300 MG capsule Take 1 capsule by mouth 3 times daily for 7 days 21 capsule 0    magnesium oxide (MAG-OX) 400 MG tablet Take 1 tablet by mouth 2 times daily 60 tablet 6    vitamin D (ERGOCALCIFEROL) 1.25 MG (22237 UT) CAPS capsule Take 1 capsule by mouth once a week 24 capsule 0    blood glucose test strips (FREESTYLE LITE) strip 1 each by In Vitro route daily As needed.  100 each 3    FreeStyle Lancets MISC 1 each by Does not apply route daily Patient needs to contact office before any further refills will be approved 90 each 3    desloratadine (CLARINEX) 5 MG tablet Take 1 tablet by mouth daily 90 tablet 3    allopurinol (ZYLOPRIM) 300 MG tablet Take 1 tablet by mouth daily Per rheumatologist 90 tablet 3    clopidogrel (PLAVIX) 75 MG tablet Take 1 tablet by mouth daily 90 tablet 3    spironolactone (ALDACTONE) 25 MG tablet Take 1 tablet by mouth every morning (before breakfast) Water pill 90 tablet 3    hydroCHLOROthiazide (HYDRODIURIL) 25 MG tablet Take 1 tablet by mouth every morning (before breakfast) Water pill 90 tablet 3    atorvastatin (LIPITOR) 40 MG tablet Take 1 tablet by mouth daily 90 tablet 3    albuterol sulfate HFA (PROAIR HFA) 108 (90 Base) MCG/ACT inhaler Inhale 2 puffs into the lungs every 6 hours as needed for Wheezing or Shortness of Breath (cough) 8 g 3    metFORMIN (GLUCOPHAGE) 500 MG tablet Take 1 tablet by mouth 2 times daily (with meals) 180 tablet 3    apixaban (ELIQUIS) 5 MG TABS tablet Take 1 tablet by mouth 2 times daily 60 tablet 11    metoprolol tartrate (LOPRESSOR) 50 MG tablet Take 1 tablet by mouth 2 times daily 180 tablet 3    Multiple Vitamins-Minerals (THERAPEUTIC MULTIVITAMIN-MINERALS) tablet Take 1 tablet by mouth daily      Blood Pressure KIT 1 kit by Does not apply route three times daily 1 kit 0    blood glucose monitor kit and supplies 1 kit by Other route three times daily Dispense Butterfly Elite CBG Device 1 kit 0       Aleve [naproxen sodium], Pioglitazone, Claritin [loratadine], Keflex [cephalexin], and Lisinopril  Social History     Tobacco Use   Smoking Status Former Smoker    Packs/day: 0.25    Years: 10.00    Pack years: 2.50    Quit date: 2000    Years since quittin.4   Smokeless Tobacco Never Used      (If patient a smoker, smoking cessation counseling offered)   Social History     Substance and Sexual Activity   Alcohol Use Not Currently    Alcohol/week: 0.0 standard drinks       REVIEW OF SYSTEMS:  Review of Systems    Physical Exam:    This a 69 y.o. female      Vitals:    21 1442   BP: 114/63   Pulse: 72   Temp: 97.2 °F (36.2 °C)     Body mass index is 25.89 kg/m².   Physical Exam

## 2021-06-11 ENCOUNTER — HOSPITAL ENCOUNTER (OUTPATIENT)
Age: 71
Setting detail: SPECIMEN
Discharge: HOME | End: 2021-06-11
Payer: MEDICARE

## 2021-06-11 DIAGNOSIS — N39.0 RECURRENT UTI: ICD-10-CM

## 2021-06-13 LAB
CULTURE: ABNORMAL
Lab: ABNORMAL
SPECIMEN DESCRIPTION: ABNORMAL

## 2021-06-14 DIAGNOSIS — I10 ESSENTIAL HYPERTENSION: ICD-10-CM

## 2021-06-14 RX ORDER — METOPROLOL TARTRATE 50 MG/1
50 TABLET, FILM COATED ORAL 2 TIMES DAILY
Qty: 180 TABLET | Refills: 3 | Status: SHIPPED | OUTPATIENT
Start: 2021-06-14 | End: 2022-02-15 | Stop reason: SDUPTHER

## 2021-06-14 RX ORDER — CIPROFLOXACIN 500 MG/1
500 TABLET, FILM COATED ORAL 2 TIMES DAILY
Qty: 6 TABLET | Refills: 0 | Status: SHIPPED | OUTPATIENT
Start: 2021-06-14 | End: 2021-06-17

## 2021-06-14 NOTE — TELEPHONE ENCOUNTER
Please Approve or Refuse.   Send to Pharmacy per Pt's Request:      Next Visit Date:  6/29/2021   Last Visit Date: 3/12/2021    Hemoglobin A1C (%)   Date Value   03/12/2021 5.9   10/27/2020 6.6 (H)   07/27/2020 8.3 (H)             ( goal A1C is < 7)   BP Readings from Last 3 Encounters:   06/01/21 114/63   05/27/21 (!) 150/56   03/24/21 (!) 163/59          (goal 120/80)  BUN   Date Value Ref Range Status   05/27/2021 27 (H) 8 - 23 mg/dL Final     CREATININE   Date Value Ref Range Status   05/27/2021 1.06 (H) 0.50 - 0.90 mg/dL Final     Potassium   Date Value Ref Range Status   05/27/2021 3.8 3.7 - 5.3 mmol/L Final

## 2021-06-14 NOTE — TELEPHONE ENCOUNTER
----- Message from Deno Babinski sent at 6/14/2021 11:06 AM EDT -----  Subject: Refill Request    QUESTIONS  Name of Medication? metoprolol tartrate (LOPRESSOR) 50 MG tablet  Patient-reported dosage and instructions? Take 1 tablet by mouth 2 times   daily 50mg  How many days do you have left? 3  [facility] [address] [address]  Pharmacy phone number (if available)? [phone]  Additional Information for Provider? Patient needs 30 days sent to [facility]   because it takes about 30 days for [facility] to get the 90 day supply to   patient.  ---------------------------------------------------------------------------  --------------,  Name of Medication? metoprolol tartrate (LOPRESSOR) 50 MG tablet  Patient-reported dosage and instructions? Take 1 tablet by mouth 2 times   daily 50mg  How many days do you have left? 3  Preferred Pharmacy? [facility] phone number (if available)? [phone]  ---------------------------------------------------------------------------  --------------  CALL BACK INFO  What is the best way for the office to contact you? OK to leave message on   voicemail  Preferred Call Back Phone Number?  [phone]

## 2021-06-25 PROBLEM — N39.0 UTI (URINARY TRACT INFECTION): Status: RESOLVED | Noted: 2021-02-18 | Resolved: 2021-06-25

## 2021-06-25 NOTE — PROGRESS NOTES
incidental finding of sigmoid diverticulosis. Patient had never had any chronic diarrhea blood in her stool. Patient was educated on what diverticulosis is versus diverticulitis. [facility]  Patient is severely hard of hearing. Patient has been evaluated by the ENT in the past has a prescription for hearing aid but is unable to pay the co-pay which is a lot. Patient and patient's daughter is encouraged to get an over-the-counter hearing enhancement equipment that can help her while waiting for the other hearing aid. CHF/AFIB   Jose Rich is a 69 y.o. female with a known history of congestive heart failureand atrial fibrillation. Current symptoms include: Mild exertional chest pressure/discomfort. Symptoms have included exertional chest pressure/discomfort and have been of mild severity. She denies chest pain, dyspnea, irregular heart beat, and palpitations. The symptoms are aggravated by  exertion, and relieved by rest. Patient's current treatment includes : plavix, spironolactone, hydrochlorothiazide eliquis. Patient is compliant all of the time with her therapy/medications. She states she is compliant with low salt diet. Patient is established with Dr. Richar Borja. Lab Results   Component Value Date    LVEF 48 07/30/2020   . HYPERTENSION  Jose Rich has a well controlled hypertension. she is currently on metoprolol, hydrochlorothiazide. Norvasc was stopped in hospital patient's most recent BP in the office was stable. she reports stable BP readings at home. Patient denies any adverse reaction to this therapy. she denies any CP, SOB, HA, or palpitations. Patient admits to exercising and adheres to low salt diet. No history of organ damage due to condition noted. Lab Results   Component Value Date/Time    CREATININE 1.06 (H) 05/27/2021 05:22 AM     DIABETES MELLITUS  Patient has a  stable Diabetes Mellitus. Current therapy includes metformin. Patient is responding well with this therapy.  Patient reports home glucose monitoring as Stable readings. Patient denieshypoglycemia episodes such as{symptoms. Patient denies neither vomiting nor diarrhea. Lab Results   Component Value Date/Time    LABA1C 5.7 06/29/2021 01:23 PM    LABA1C 5.9 03/12/2021 02:33 PM      CHRONIC KIDNEY DISEASE  Joshua Chowdhury has a stage 3a CKD. Patient is under the care of DR. Novak.  No follow-up set up. Tries to cut down on his salt. Drinks more fluids lately. On allupironol. Lab Results   Component Value Date/Time    K 3.8 05/27/2021 05:22 AM    BUN 27 (H) 05/27/2021 05:22 AM    CREATININE 1.06 (H) 05/27/2021 05:22 AM    LABGLOM 51 (L) 05/27/2021 05:22 AM    GFRAA >60 05/27/2021 05:22 AM      HYPERLIPIDEMIA  Charlie Espino is currently on atorvastatin (Lipitor). Patient denies adverse reaction to this medication. Compliance with treatment thus far has been good. The patient is known to have coexisting coronary artery disease. The CVD Risk score (D'Agostino, et al., 2008) failed to calculate for the following reasons: The patient has a prior MI, stroke, CHF, or peripheral vascular disease diagnosis  Lab Results   Component Value Date/Time    CHOL 117 07/27/2020 04:49 AM    HDL 48 07/27/2020 04:49 AM    LDLCHOLESTEROL 47 07/27/2020 04:49 AM    CHOLHDLRATIO 2.4 07/27/2020 04:49 AM    TRIG 112 07/27/2020 04:49 AM    VLDL NOT REPORTED 07/27/2020 04:49 AM     Started on HCTZ, no potassium supplementation.   Lab Results   Component Value Date     05/27/2021    K 3.8 05/27/2021     05/27/2021    CO2 20 05/27/2021    BUN 27 (H) 05/27/2021    CREATININE 1.06 (H) 05/27/2021    GLUCOSE 165 (H) 05/27/2021    CALCIUM 9.8 05/27/2021    PROT 6.3 (L) 05/25/2021    LABALBU 3.6 05/25/2021    BILITOT 0.35 05/25/2021    ALKPHOS 96 05/25/2021    AST 22 05/25/2021    ALT 11 05/25/2021    LABGLOM 51 (L) 05/27/2021    GFRAA >60 05/27/2021           VITAL SIGNS:  Vitals:    06/29/21 1314   BP: 108/62   Pulse: 88   Temp: 98.4 °F (36.9 °C)   TempSrc: Temporal   SpO2: 97%   Weight: 134 lb (60.8 kg)   Height: 4' 11\" (1.499 m)   Estimated body mass index is 27.06 kg/m² as calculated from the following:    Height as of this encounter: 4' 11\" (1.499 m). Weight as of this encounter: 134 lb (60.8 kg). Review of Systems   Constitutional: Positive for activity change. Negative for chills, fatigue and fever. HENT: Positive for hearing loss. Respiratory: Positive for shortness of breath (on exertion). Negative for cough, chest tightness and wheezing. Cardiovascular: Positive for leg swelling. Negative for chest pain and palpitations. Gastrointestinal: Negative for abdominal pain. Endocrine: Negative. Genitourinary: Negative for dysuria and hematuria. Musculoskeletal: Negative for arthralgias and myalgias. Skin: Positive for color change. Negative for rash. Neurological: Negative for headaches. Psychiatric/Behavioral: Positive for dysphoric mood. Negative for sleep disturbance and suicidal ideas. The patient is nervous/anxious. Physical Exam  Vitals and nursing note reviewed. Constitutional:       Appearance: She is well-developed. HENT:      Head: Normocephalic. Right Ear: External ear normal. Decreased hearing noted. Left Ear: External ear normal. Decreased hearing noted. Nose: Nose normal.      Mouth/Throat:      Mouth: Mucous membranes are moist.   Eyes:      Pupils: Pupils are equal, round, and reactive to light. Cardiovascular:      Rate and Rhythm: Normal rate and regular rhythm. Pulses: Normal pulses. Heart sounds: Normal heart sounds. Pulmonary:      Effort: Pulmonary effort is normal. No respiratory distress. Breath sounds: Examination of the right-lower field reveals decreased breath sounds. Decreased breath sounds present. Abdominal:      General: Abdomen is protuberant. Bowel sounds are normal. There is no distension. Palpations: Abdomen is soft. Tenderness:  There is no abdominal tenderness. Comments: obese   Musculoskeletal:         General: Normal range of motion. Cervical back: Normal range of motion and neck supple. Comments: Walks with a walker   Skin:     General: Skin is warm and dry. Capillary Refill: Capillary refill takes less than 2 seconds. Neurological:      Mental Status: She is alert and oriented to person, place, and time. Motor: No weakness. Gait: Gait abnormal.      Comments: Delayed get up and go test   Psychiatric:         Mood and Affect: Mood is anxious. Speech: Speech is rapid and pressured. Behavior: Behavior normal.         Thought Content: Thought content does not include homicidal or suicidal ideation. MEDICAL HISTORY      Diagnosis Date    Acute CVA (cerebrovascular accident) ([facility] [address] 7/25/2020    Arthritis     Brain mass     Cellulitis and abscess     Cellulitis and abscess of unspecified site     Cellulitis of left lower extremity 7/26/2020    Cellulitis of lower extremity 10/4/2020    CHF (congestive heart failure) (HCC)     Chronic kidney disease, unspecified     Coronary atherosclerosis of native coronary artery     Essential hypertension 7/25/2020    Essential hypertension, malignant     Hallucinations 2/18/2021    Hard of hearing     Head contusion 9/9/2020    Hypokalemia 9/9/2020    Iron deficiency anemia, unspecified     Myocardial infarction ([facility] [address]     Other and unspecified hyperlipidemia     Pure hypercholesterolemia     Toxic metabolic encephalopathy 9/53/9369    Type II or unspecified type diabetes mellitus without mention of complication, not stated as uncontrolled     Unspecified asthma(493.90)     Unspecified venous (peripheral) insufficiency     Unspecified vitamin D deficiency     UTI (urinary tract infection) 2/18/2021      MEDICATIONS  Prior to Visit Medications    Medication Sig Taking?  Authorizing Provider   metoprolol tartrate (LOPRESSOR) 50 MG tablet Take 1 tablet by mouth 2 times daily Yes Maya Johnson MD   miconazole (MICOTIN) 2 % powder Apply topically 2 times daily. Yes Renato Miramontes MD   magnesium oxide (MAG-OX) 400 MG tablet Take 1 tablet by mouth 2 times daily Yes Priyanka Neri MD   sodium bicarbonate 650 MG tablet Take 1 tablet by mouth 3 times daily Yes Priyanka Neri MD   vitamin D (ERGOCALCIFEROL) 1.25 MG (19506 UT) CAPS capsule Take 1 capsule by mouth once a week Yes Maya Johnson MD   blood glucose test strips (FREESTYLE LITE) strip 1 each by In Vitro route daily As needed.  Yes Maya Johnson MD   FreeStyle Lancets MISC 1 each by Does not apply route daily Patient needs to contact office before any further refills will be approved Yes Maya Johnson MD   desloratadine (CLARINEX) 5 MG tablet Take 1 tablet by mouth daily Yes Maya Johnson MD   allopurinol (ZYLOPRIM) 300 MG tablet Take 1 tablet by mouth daily Per rheumatologist Yes Maya Johnson MD   clopidogrel (PLAVIX) 75 MG tablet Take 1 tablet by mouth daily Yes Maya Johnson MD   spironolactone (ALDACTONE) 25 MG tablet Take 1 tablet by mouth every morning (before breakfast) Water pill Yes Maya Johnson MD   hydroCHLOROthiazide (HYDRODIURIL) 25 MG tablet Take 1 tablet by mouth every morning (before breakfast) Water pill Yes Maya Johnson MD   atorvastatin (LIPITOR) 40 MG tablet Take 1 tablet by mouth daily Yes Maya Johnson MD   albuterol sulfate HFA (PROAIR HFA) 108 (90 Base) MCG/ACT inhaler Inhale 2 puffs into the lungs every 6 hours as needed for Wheezing or Shortness of Breath (cough) Yes Maya Johnson MD   metFORMIN (GLUCOPHAGE) 500 MG tablet Take 1 tablet by mouth 2 times daily (with meals) Yes Maya Johnson MD   apixaban (ELIQUIS) 5 MG TABS tablet Take 1 tablet by mouth 2 times daily Yes Harpreet Walker, APRN - CNP   Multiple Vitamins-Minerals (THERAPEUTIC MULTIVITAMIN-MINERALS) tablet Take 1 tablet by mouth daily Yes iMno Berry MD   Blood Pressure KIT 1 kit by Does not apply route three times daily Yes Anu Baker MD   blood glucose monitor kit and supplies 1 kit by Other route three times daily Chanel Stephen CBG Device Yes Joshua Palacios MD       ASSESSMENT/PLAN:  1. Kidney stones  Stable  Established with urology  Follow up encouraged    2. Recurrent UTI  Failure to Improve  Recheck urine      3. Diverticulosis  Stable  Continue to monitor      4. Chronic diastolic CHF (congestive heart failure) (HCC)  Stable  Continue with the therapy. DISCUSSED and ADVISED TO:  Weigh daily and log. Keep regular activity as tolerated. Cut down on salt intake. Keep appointment with the cardiologist.  Go to the ER for CP and SOB not relieved with rest.      5. Essential hypertension  Stable  Continue current therapy. DISCUSSED AND ADVISED TO:  Cut down on your salt intake. Cut down on caffeinated drinks, sports drinks. Instructed to check BP at home regularly. Report for any chest pains, shortness of breath, headaches, and lightheadedness. Call the office if your blood pressure continue to be higher than 140/90 or 90/50. 6. Type 2 diabetes mellitus with stage 3 chronic kidney disease, without long-term current use of insulin, unspecified whether stage 3a or 3b CKD ([facility] [address]  Stable  Continue therapy. We will continue to monitor Hemoglobin A1c   DISCUSSED and ADVISED TO:  Continue to check Glucose levels at home. Report increased and low levels as discussed. Decrease carbohydrates, sugary drinks, desserts in your diet. Exercise regularly, as tolerated. Try to lose weight.      - POCT glycosylated hemoglobin (Hb A1C)  - Basic Metabolic Panel; Future    7. Paroxysmal atrial fibrillation (HCC)  Stable  Continue therapy. Follow up with cardiology encouraged    8. Hyperlipidemia with target LDL less than 70  Stable  Continue therapy.   Advised to decrease the consumption of red meats,

## 2021-06-29 ENCOUNTER — HOSPITAL ENCOUNTER (OUTPATIENT)
Age: 71
Setting detail: SPECIMEN
Discharge: HOME | End: 2021-06-29
Payer: MEDICARE

## 2021-06-29 ENCOUNTER — OFFICE VISIT (OUTPATIENT)
Dept: FAMILY MEDICINE CLINIC | Age: 71
End: 2021-06-29
Payer: MEDICARE

## 2021-06-29 VITALS
OXYGEN SATURATION: 97 % | WEIGHT: 134 LBS | SYSTOLIC BLOOD PRESSURE: 108 MMHG | HEART RATE: 88 BPM | HEIGHT: 59 IN | BODY MASS INDEX: 27.01 KG/M2 | DIASTOLIC BLOOD PRESSURE: 62 MMHG | TEMPERATURE: 98.4 F

## 2021-06-29 DIAGNOSIS — E11.22 TYPE 2 DIABETES MELLITUS WITH STAGE 3 CHRONIC KIDNEY DISEASE, WITHOUT LONG-TERM CURRENT USE OF INSULIN, UNSPECIFIED WHETHER STAGE 3A OR 3B CKD (HCC): ICD-10-CM

## 2021-06-29 DIAGNOSIS — E87.6 DIURETIC-INDUCED HYPOKALEMIA: ICD-10-CM

## 2021-06-29 DIAGNOSIS — N39.0 RECURRENT UTI: ICD-10-CM

## 2021-06-29 DIAGNOSIS — I10 ESSENTIAL HYPERTENSION: ICD-10-CM

## 2021-06-29 DIAGNOSIS — T50.2X5A DIURETIC-INDUCED HYPOKALEMIA: ICD-10-CM

## 2021-06-29 DIAGNOSIS — N20.0 KIDNEY STONES: Primary | ICD-10-CM

## 2021-06-29 DIAGNOSIS — E78.5 HYPERLIPIDEMIA WITH TARGET LDL LESS THAN 70: ICD-10-CM

## 2021-06-29 DIAGNOSIS — H91.93 BILATERAL HEARING LOSS, UNSPECIFIED HEARING LOSS TYPE: ICD-10-CM

## 2021-06-29 DIAGNOSIS — K57.90 DIVERTICULOSIS: ICD-10-CM

## 2021-06-29 DIAGNOSIS — N18.30 TYPE 2 DIABETES MELLITUS WITH STAGE 3 CHRONIC KIDNEY DISEASE, WITHOUT LONG-TERM CURRENT USE OF INSULIN, UNSPECIFIED WHETHER STAGE 3A OR 3B CKD (HCC): ICD-10-CM

## 2021-06-29 DIAGNOSIS — I48.0 PAROXYSMAL ATRIAL FIBRILLATION (HCC): ICD-10-CM

## 2021-06-29 DIAGNOSIS — I50.32 CHRONIC DIASTOLIC CHF (CONGESTIVE HEART FAILURE) (HCC): ICD-10-CM

## 2021-06-29 LAB
ANION GAP SERPL CALCULATED.3IONS-SCNC: 13 MMOL/L (ref 9–17)
BUN BLDV-MCNC: 40 MG/DL (ref 8–23)
BUN/CREAT BLD: ABNORMAL (ref 9–20)
CALCIUM SERPL-MCNC: 8.7 MG/DL (ref 8.6–10.4)
CHLORIDE BLD-SCNC: 102 MMOL/L (ref 98–107)
CO2: 26 MMOL/L (ref 20–31)
CREAT SERPL-MCNC: 1.02 MG/DL (ref 0.5–0.9)
GFR AFRICAN AMERICAN: >60 ML/MIN
GFR NON-AFRICAN AMERICAN: 53 ML/MIN
GFR SERPL CREATININE-BSD FRML MDRD: ABNORMAL ML/MIN/{1.73_M2}
GFR SERPL CREATININE-BSD FRML MDRD: ABNORMAL ML/MIN/{1.73_M2}
GLUCOSE BLD-MCNC: 96 MG/DL (ref 70–99)
HBA1C MFR BLD: 5.7 %
POTASSIUM SERPL-SCNC: 4 MMOL/L (ref 3.7–5.3)
SODIUM BLD-SCNC: 141 MMOL/L (ref 135–144)

## 2021-06-29 PROCEDURE — 1123F ACP DISCUSS/DSCN MKR DOCD: CPT | Performed by: FAMILY MEDICINE

## 2021-06-29 PROCEDURE — 83036 HEMOGLOBIN GLYCOSYLATED A1C: CPT | Performed by: FAMILY MEDICINE

## 2021-06-29 PROCEDURE — 2022F DILAT RTA XM EVC RTNOPTHY: CPT | Performed by: FAMILY MEDICINE

## 2021-06-29 PROCEDURE — 3044F HG A1C LEVEL LT 7.0%: CPT | Performed by: FAMILY MEDICINE

## 2021-06-29 PROCEDURE — 1036F TOBACCO NON-USER: CPT | Performed by: FAMILY MEDICINE

## 2021-06-29 PROCEDURE — G8427 DOCREV CUR MEDS BY ELIG CLIN: HCPCS | Performed by: FAMILY MEDICINE

## 2021-06-29 PROCEDURE — 1090F PRES/ABSN URINE INCON ASSESS: CPT | Performed by: FAMILY MEDICINE

## 2021-06-29 PROCEDURE — 3017F COLORECTAL CA SCREEN DOC REV: CPT | Performed by: FAMILY MEDICINE

## 2021-06-29 PROCEDURE — 4040F PNEUMOC VAC/ADMIN/RCVD: CPT | Performed by: FAMILY MEDICINE

## 2021-06-29 PROCEDURE — G8417 CALC BMI ABV UP PARAM F/U: HCPCS | Performed by: FAMILY MEDICINE

## 2021-06-29 PROCEDURE — G8400 PT W/DXA NO RESULTS DOC: HCPCS | Performed by: FAMILY MEDICINE

## 2021-06-29 PROCEDURE — 99214 OFFICE O/P EST MOD 30 MIN: CPT | Performed by: FAMILY MEDICINE

## 2021-06-29 SDOH — ECONOMIC STABILITY: FOOD INSECURITY: WITHIN THE PAST 12 MONTHS, THE FOOD YOU BOUGHT JUST DIDN'T LAST AND YOU DIDN'T HAVE MONEY TO GET MORE.: NEVER TRUE

## 2021-06-29 SDOH — ECONOMIC STABILITY: FOOD INSECURITY: WITHIN THE PAST 12 MONTHS, YOU WORRIED THAT YOUR FOOD WOULD RUN OUT BEFORE YOU GOT MONEY TO BUY MORE.: NEVER TRUE

## 2021-06-29 ASSESSMENT — ENCOUNTER SYMPTOMS
COUGH: 0
ABDOMINAL PAIN: 0
WHEEZING: 0
SHORTNESS OF BREATH: 1
COLOR CHANGE: 1
CHEST TIGHTNESS: 0

## 2021-06-29 ASSESSMENT — SOCIAL DETERMINANTS OF HEALTH (SDOH): HOW HARD IS IT FOR YOU TO PAY FOR THE VERY BASICS LIKE FOOD, HOUSING, MEDICAL CARE, AND HEATING?: NOT VERY HARD

## 2021-06-29 NOTE — PATIENT INSTRUCTIONS
Patient Education        Learning About Diverticulosis and Diverticulitis  What are diverticulosis and diverticulitis? In diverticulosis and diverticulitis, pouches called diverticula form in the wall of the large intestine, or colon. · In diverticulosis, the pouches do not cause any pain or other symptoms. · In diverticulitis, the pouches get inflamed or infected and cause symptoms. Doctors aren't sure what causes these pouches in the colon. But they think that a low-fiber diet may play a role. Without fiber to add bulk to the stool, the colon has to work harder than normal to push the stool forward. The pressure from this may cause pouches to form in weak spots along the colon. Some people with diverticulosis get diverticulitis. But experts don't know why this happens. What are the symptoms? · In diverticulosis, most people don't have symptoms. But pouches sometimes bleed. · In diverticulitis, symptoms may last from a few hours to a week or more. They include:  ? Belly pain. This is usually in the lower left side. It is sometimes worse when you move. This is the most common symptom. ? Fever and chills. ? Bloating and gas. ? Diarrhea or constipation. ? Nausea and sometimes vomiting.  ? Not feeling like eating. How can you prevent diverticulitis? You may be able to lower your chance of getting diverticulitis. You can do this by taking steps to prevent constipation. · Eat fruits, vegetables, beans, and whole grains every day. These foods are high in fiber. · Drink plenty of fluids. If you have kidney, heart, or liver disease and have to limit fluids, talk with your doctor before you increase the amount of fluids you drink. · Get at least 30 minutes of exercise on most days of the week. Walking is a good choice. You also may want to do other activities, such as running, swimming, cycling, or playing tennis or team sports.   · Take a fiber supplement, such as Citrucel or Metamucil, every day if needed. Read and follow all instructions on the label. · Schedule time each day for a bowel movement. Having a daily routine may help. Take your time and do not strain when having a bowel movement. Some people avoid nuts, seeds, berries, and popcorn. They believe that these foods might get trapped in the diverticula and cause pain. But there is no proof that these foods cause diverticulitis or make it worse. How are these problems treated? · The best way to treat diverticulosis is to avoid constipation. · Treatment for diverticulitis includes antibiotics. It often includes a change in your diet. You may need only liquids at first. Your doctor may suggest pain medicines for pain or belly cramps. In some cases, surgery may be needed. Follow-up care is a key part of your treatment and safety. Be sure to make and go to all appointments, and call your doctor if you are having problems. It's also a good idea to know your test results and keep a list of the medicines you take. Where can you learn more? Go to [email or web address] org and sign in to your [employer] account. Enter X471 in the [facility] box to learn more about \"Learning About Diverticulosis and Diverticulitis. \"     If you do not have an account, please click on the \"Sign Up Now\" link. Current as of: February 10, 2021               Content Version: 12.9  © 5209-3577 Healthwise, Incorporated. Care instructions adapted under license by [facility] ([facility]). If you have questions about a medical condition or this instruction, always ask your healthcare professional. [place] [address] any warranty or liability for your use of this information.

## 2021-06-29 NOTE — PROGRESS NOTES
Visit Information    Have you changed or started any medications since your last visit including any over-the-counter medicines, vitamins, or herbal medicines? no   Are you having any side effects from any of your medications? -  no  Have you stopped taking any of your medications? Is so, why? -  no    Have you seen any other physician or provider since your last visit? Yes - Records Obtained  Have you had any other diagnostic tests since your last visit? Yes - Records Obtained  Have you been seen in the emergency room and/or had an admission to a hospital since we last saw you? Yes - Records Obtained  Have you had your routine dental cleaning in the past 6 months? no    Have you activated your [employer] account? If not, what are your barriers?  Yes     Patient Care Team:  Diego Ibarra MD as PCP - General (Family Medicine)  Diego Ibarra MD as PCP - [facility]  Justin Torres MD as Consulting Physician (Rheumatology)  Shaina King MD as Surgeon (Cardiology)  Lester Patel MD as Consulting Physician (Nephrology)  Tamara Lopez MD as Consulting Physician (Neurology)  Lester Patel MD as Consulting Physician (Nephrology)    Medical History Review  Past Medical, Family, and Social History reviewed and does contribute to the patient presenting condition    Health Maintenance   Topic Date Due    COVID-19 Vaccine (1) Never done    DTaP/Tdap/Td vaccine (1 - Tdap) Never done    Shingles Vaccine (1 of 2) Never done    Pneumococcal 65+ years Vaccine (1 of 1 - PPSV23) Never done    Colon Cancer Screen FIT/FOBT  03/29/2018    Diabetic foot exam  10/01/2019    Annual Wellness Visit (AWV)  Never done    Flu vaccine (Season Ended) 10/04/2021 (Originally 9/1/2021)    Lipid screen  07/27/2021    Diabetic retinal exam  10/19/2021    A1C test (Diabetic or Prediabetic)  03/12/2022    Diabetic microalbuminuria test  03/18/2022    Potassium monitoring  05/27/2022    Creatinine monitoring  05/27/2022    Breast cancer screen  08/28/2022    DEXA (modify frequency per FRAX score)  Completed    Hepatitis C screen  Completed    Hepatitis A vaccine  Aged Out    Hib vaccine  Aged Out    Meningococcal (ACWY) vaccine  Aged Out

## 2021-07-09 ENCOUNTER — HOSPITAL ENCOUNTER (OUTPATIENT)
Age: 71
Setting detail: SPECIMEN
Discharge: HOME | End: 2021-07-09
Payer: MEDICARE

## 2021-07-09 DIAGNOSIS — E13.22 SECONDARY DIABETES MELLITUS WITH STAGE 3 CHRONIC KIDNEY DISEASE (HCC): ICD-10-CM

## 2021-07-09 DIAGNOSIS — I12.9 BENIGN HYPERTENSION WITH CKD (CHRONIC KIDNEY DISEASE) STAGE III (HCC): ICD-10-CM

## 2021-07-09 DIAGNOSIS — N18.30 SECONDARY DIABETES MELLITUS WITH STAGE 3 CHRONIC KIDNEY DISEASE (HCC): ICD-10-CM

## 2021-07-09 DIAGNOSIS — N18.30 BENIGN HYPERTENSION WITH CKD (CHRONIC KIDNEY DISEASE) STAGE III (HCC): ICD-10-CM

## 2021-07-09 DIAGNOSIS — N18.31 STAGE 3A CHRONIC KIDNEY DISEASE (HCC): ICD-10-CM

## 2021-07-09 LAB
-: ABNORMAL
AMORPHOUS: ABNORMAL
BACTERIA: ABNORMAL
BILIRUBIN URINE: NEGATIVE
CASTS UA: ABNORMAL /LPF (ref 0–8)
COLOR: YELLOW
CRYSTALS, UA: ABNORMAL /HPF
EPITHELIAL CELLS UA: ABNORMAL /HPF (ref 0–5)
GLUCOSE URINE: ABNORMAL
KETONES, URINE: NEGATIVE
LEUKOCYTE ESTERASE, URINE: ABNORMAL
MUCUS: ABNORMAL
NITRITE, URINE: NEGATIVE
OTHER OBSERVATIONS UA: ABNORMAL
PH UA: 6 (ref 5–8)
PROTEIN UA: ABNORMAL
RBC UA: ABNORMAL /HPF (ref 0–4)
RENAL EPITHELIAL, UA: ABNORMAL /HPF
SPECIFIC GRAVITY UA: 1.02 (ref 1–1.03)
TRICHOMONAS: ABNORMAL
TURBIDITY: CLEAR
URINE HGB: ABNORMAL
UROBILINOGEN, URINE: NORMAL
WBC UA: ABNORMAL /HPF (ref 0–5)
YEAST: ABNORMAL

## 2021-07-09 PROCEDURE — 81001 URINALYSIS AUTO W/SCOPE: CPT

## 2021-07-21 DIAGNOSIS — E55.9 VITAMIN D DEFICIENCY: Primary | ICD-10-CM

## 2021-07-21 RX ORDER — ERGOCALCIFEROL 1.25 MG/1
50000 CAPSULE ORAL WEEKLY
Qty: 12 CAPSULE | Refills: 0 | Status: SHIPPED | OUTPATIENT
Start: 2021-07-21 | End: 2021-07-22 | Stop reason: SDUPTHER

## 2021-07-21 NOTE — TELEPHONE ENCOUNTER
----- Message from [place] sent at 7/21/2021  1:00 PM EDT -----  Subject: Message to Provider    QUESTIONS  Information for Provider? Patient would like a med list. She is on many   different meds from many different Dr and would like a list of all meds   that she is on. She said that she could come pick it up. Not sure if she   should still be taking Vitamin D. If she is still to take this she needs a   refill. Wants to make sure that she is taking all the right things and not   mixing anything. She is worried about mixing meds that she should not.   ---------------------------------------------------------------------------  --------------  CALL BACK INFO  What is the best way for the office to contact you? OK to leave message on   voicemail  Preferred Call Back Phone Number? [phone]  ---------------------------------------------------------------------------  --------------  SCRIPT ANSWERS  Relationship to Patient?  Self

## 2021-07-22 ENCOUNTER — TELEPHONE (OUTPATIENT)
Dept: FAMILY MEDICINE CLINIC | Age: 71
End: 2021-07-22

## 2021-07-22 DIAGNOSIS — E55.9 VITAMIN D DEFICIENCY: ICD-10-CM

## 2021-07-22 RX ORDER — ERGOCALCIFEROL 1.25 MG/1
50000 CAPSULE ORAL WEEKLY
Qty: 12 CAPSULE | Refills: 0 | Status: SHIPPED | OUTPATIENT
Start: 2021-07-22 | End: 2021-09-29 | Stop reason: SDUPTHER

## 2021-07-22 NOTE — TELEPHONE ENCOUNTER
----- Message from Crescencio Frye sent at 7/22/2021  1:28 PM EDT -----  Subject: Refill Request    QUESTIONS  Name of Medication? vitamin D (ERGOCALCIFEROL) 1.25 MG (20754 UT) CAPS   capsule  Patient-reported dosage and instructions? 1.25 MG once a week  How many days do you have left? 0  Preferred Pharmacy? [address] [facility]  Pharmacy phone number (if available)? [phone]  Additional Information for Provider? Patient wanted to know if the doctor   recommends she still take this Vitim and if so if it can be sent to her   pharmacy of choice on file.   ---------------------------------------------------------------------------  --------------  [address]  What is the best way for the office to contact you? OK to leave message on   voicemail  Preferred Call Back Phone Number?  [phone]

## 2021-07-22 NOTE — TELEPHONE ENCOUNTER
----- Message from Russell Joanna sent at 7/22/2021  1:26 PM EDT -----  Subject: Message to Provider    QUESTIONS  Information for Provider? Patient called in to request to have her PCP to   look over her updated medication list to make sure none of her new   medications conflict with her on going medications. Patient is requesting   an updated medication list for her records as well. She just wants to   ensure all current up to date medications are compatible and she wants to   be full educated on what she is taking.   ---------------------------------------------------------------------------  --------------  [address]  What is the best way for the office to contact you? OK to leave message on   voicemail  Preferred Call Back Phone Number? [phone]  ---------------------------------------------------------------------------  --------------  SCRIPT ANSWERS  Relationship to Patient?  Self

## 2021-07-22 NOTE — TELEPHONE ENCOUNTER
Please make her a virtual with me, she did see multiple doctors since March when I last saw her.     Future Appointments   Date Time Provider Angela Enamoradoi   8/20/2021  2:00 PM Brian Colmenares MD [facility]   9/14/2021  2:40 PM Kelechi Juan MD [address] [place] [address]   11/24/2021  4:00 PM Brian Colmenares MD [facility]

## 2021-09-14 ENCOUNTER — OFFICE VISIT (OUTPATIENT)
Dept: UROLOGY | Age: 71
End: 2021-09-14
Payer: MEDICARE

## 2021-09-14 VITALS
HEIGHT: 59 IN | WEIGHT: 134 LBS | BODY MASS INDEX: 27.01 KG/M2 | SYSTOLIC BLOOD PRESSURE: 121 MMHG | TEMPERATURE: 97 F | HEART RATE: 71 BPM | DIASTOLIC BLOOD PRESSURE: 58 MMHG

## 2021-09-14 DIAGNOSIS — N39.0 RECURRENT UTI: ICD-10-CM

## 2021-09-14 DIAGNOSIS — N20.0 KIDNEY STONE: Primary | ICD-10-CM

## 2021-09-14 PROCEDURE — 1090F PRES/ABSN URINE INCON ASSESS: CPT | Performed by: UROLOGY

## 2021-09-14 PROCEDURE — 3017F COLORECTAL CA SCREEN DOC REV: CPT | Performed by: UROLOGY

## 2021-09-14 PROCEDURE — G8427 DOCREV CUR MEDS BY ELIG CLIN: HCPCS | Performed by: UROLOGY

## 2021-09-14 PROCEDURE — 4040F PNEUMOC VAC/ADMIN/RCVD: CPT | Performed by: UROLOGY

## 2021-09-14 PROCEDURE — G8417 CALC BMI ABV UP PARAM F/U: HCPCS | Performed by: UROLOGY

## 2021-09-14 PROCEDURE — 99214 OFFICE O/P EST MOD 30 MIN: CPT | Performed by: UROLOGY

## 2021-09-14 PROCEDURE — G8400 PT W/DXA NO RESULTS DOC: HCPCS | Performed by: UROLOGY

## 2021-09-14 PROCEDURE — 1036F TOBACCO NON-USER: CPT | Performed by: UROLOGY

## 2021-09-14 PROCEDURE — 1123F ACP DISCUSS/DSCN MKR DOCD: CPT | Performed by: UROLOGY

## 2021-09-14 RX ORDER — AMLODIPINE BESYLATE 5 MG/1
5 TABLET ORAL DAILY
COMMUNITY
Start: 2021-08-18 | End: 2021-10-15 | Stop reason: SDUPTHER

## 2021-09-14 RX ORDER — COLCHICINE 0.6 MG/1
0.6 TABLET ORAL DAILY
COMMUNITY
Start: 2021-08-30 | End: 2022-02-15 | Stop reason: DRUGHIGH

## 2021-09-14 ASSESSMENT — ENCOUNTER SYMPTOMS
WHEEZING: 0
DIARRHEA: 1
ABDOMINAL PAIN: 0
VOMITING: 0
COUGH: 1
NAUSEA: 0
EYE PAIN: 0
EYE REDNESS: 0
BACK PAIN: 0
SHORTNESS OF BREATH: 0
CONSTIPATION: 0

## 2021-09-14 NOTE — PROGRESS NOTES
[address] [address]  Dept:  [facility] Urology Office Note - Established    Patient:  Liz Mehta  YOB: 1950  Date: 9/14/2021    The patient is a 66 y.o. female whopresents today for evaluation of the following problems:   Chief Complaint   Patient presents with    Follow-up     Three month follow-up hematuria       HPI  She is here for hematuria. She has been having infections. She does not seem to have an infection. She has a large renal stone, but is not interested in any treatment at this time. We discussed a cysto as well, but she was not interested. Summary of old records: N/A    Additional History: N/A    Procedures Today: N/A    Urinalysis today:  No results found for this visit on 09/14/21. Imaging Reviewed during this Office Visit: CT images reviewed, non obstructing stones.    (results were independently reviewed by physician and radiology report verified)    AUA Symptom Score (9/14/2021):                               Last BUN and creatinine:  Lab Results   Component Value Date    BUN 40 (H) 06/29/2021     Lab Results   Component Value Date    CREATININE 1.02 (H) 06/29/2021       Additional Lab/Culture results: none    PAST MEDICAL, FAMILY AND SOCIAL HISTORY UPDATE:  Past Medical History:   Diagnosis Date    Acute CVA (cerebrovascular accident) ([facility] [address] 7/25/2020    Arthritis     Brain mass     Cellulitis and abscess     Cellulitis and abscess of unspecified site     Cellulitis of left lower extremity 7/26/2020    Cellulitis of lower extremity 10/4/2020    CHF (congestive heart failure) (HCC)     Chronic kidney disease, unspecified     Coronary atherosclerosis of native coronary artery     Essential hypertension 7/25/2020    Essential hypertension, malignant     Hallucinations 2/18/2021    Hard of hearing     Head contusion 9/9/2020    Hypokalemia 9/9/2020    Iron deficiency anemia, unspecified     [facility]ocardial infarction ([facility] [address]     Other and unspecified hyperlipidemia     Pure hypercholesterolemia     Toxic metabolic encephalopathy 3/41/1853    Type II or unspecified type diabetes mellitus without mention of complication, not stated as uncontrolled     Unspecified asthma(493.90)     Unspecified venous (peripheral) insufficiency     Unspecified vitamin D deficiency     UTI (urinary tract infection) 2/18/2021     Past Surgical History:   Procedure Laterality Date    CORONARY ARTERY BYPASS GRAFT      x 3, Dr. Maricruz Orlando       Family History   Problem Relation Age of Onset    Diabetes Mother     Heart Disease Mother     Heart Disease Father     Diabetes Sister     High Blood Pressure Sister     Diabetes Maternal Grandmother      Outpatient Medications Marked as Taking for the 9/14/21 encounter (Office Visit) with Radha Xiong MD   Medication Sig Dispense Refill    amLODIPine (NORVASC) 5 MG tablet Take 5 mg by mouth daily      colchicine (COLCRYS) 0.6 MG tablet Take 0.6 mg by mouth daily      diclofenac sodium (VOLTAREN) 1 % GEL Apply 4 g topically 3 times daily      magnesium oxide (MAG-OX) 400 (241.3 Mg) MG TABS tablet Take 400 mg by mouth 2 times daily      vitamin D (ERGOCALCIFEROL) 1.25 MG (93963 UT) CAPS capsule Take 1 capsule by mouth once a week 12 capsule 0    furosemide (LASIX) 20 MG tablet Take 1 tablet by mouth daily 90 tablet 3    losartan (COZAAR) 25 MG tablet Take 1 tablet by mouth daily 90 tablet 3    metoprolol tartrate (LOPRESSOR) 50 MG tablet Take 1 tablet by mouth 2 times daily 180 tablet 3    miconazole (MICOTIN) 2 % powder Apply topically 2 times daily.  45 g 1    magnesium oxide (MAG-OX) 400 MG tablet Take 1 tablet by mouth 2 times daily 60 tablet 6    sodium bicarbonate 650 MG tablet Take 1 tablet by mouth 3 times daily 270 tablet 3    blood glucose test strips (FREESTYLE LITE) strip 1 each by In Vitro route daily As needed.  100 each 3    FreeStyle Lancets MISC 1 each by Does not apply route daily Patient needs to contact office before any further refills will be approved 90 each 3    desloratadine (CLARINEX) 5 MG tablet Take 1 tablet by mouth daily 90 tablet 3    allopurinol (ZYLOPRIM) 300 MG tablet Take 1 tablet by mouth daily Per rheumatologist 90 tablet 3    clopidogrel (PLAVIX) 75 MG tablet Take 1 tablet by mouth daily 90 tablet 3    spironolactone (ALDACTONE) 25 MG tablet Take 1 tablet by mouth every morning (before breakfast) Water pill 90 tablet 3    atorvastatin (LIPITOR) 40 MG tablet Take 1 tablet by mouth daily 90 tablet 3    albuterol sulfate HFA (PROAIR HFA) 108 (90 Base) MCG/ACT inhaler Inhale 2 puffs into the lungs every 6 hours as needed for Wheezing or Shortness of Breath (cough) 8 g 3    metFORMIN (GLUCOPHAGE) 500 MG tablet Take 1 tablet by mouth 2 times daily (with meals) 180 tablet 3    apixaban (ELIQUIS) 5 MG TABS tablet Take 1 tablet by mouth 2 times daily 60 tablet 11    Multiple Vitamins-Minerals (THERAPEUTIC MULTIVITAMIN-MINERALS) tablet Take 1 tablet by mouth daily      Blood Pressure KIT 1 kit by Does not apply route three times daily 1 kit 0    blood glucose monitor kit and supplies 1 kit by Other route three times daily Dispense Butterfly Elite CBG Device 1 kit 0      (All medications reviewed and updated by provider sincelast office visit or hospitalization)   Latex, Aleve [naproxen sodium], Pioglitazone, Claritin [loratadine], Keflex [cephalexin], and Lisinopril  Social History     Tobacco Use   Smoking Status Former Smoker    Packs/day: 0.25    Years: 10.00    Pack years: 2.50    Quit date: 2000    Years since quittin.7   Smokeless Tobacco Never Used      (If patient a smoker, smoking cessation counseling offered)     Social History     Substance and Sexual Activity   Alcohol Use Not Currently    Alcohol/week: 0.0 standard drinks REVIEW OF SYSTEMS:  Review of Systems      Physical Exam:      Vitals:    09/14/21 1446   BP: (!) 121/58   Pulse: 71   Temp: 97 °F (36.1 °C)     Body mass index is 27.06 kg/m². Patient is a 66 y.o. female in noacute distress and alert and oriented to person, place and time. Physical Exam  Constitutional: Patient in no acute distress. Neuro: Alert andoriented to person, place and time. Psych: Mood normal, affect normal  Skin: No rash noted  HEENT: Head: Normocephalic and atraumatic  Conjunctivae and EOM are normal. Pupils are equal, round  Nose: Normal  Right External Ear: Normal; Left External Ear: Normal  Mouth: Mucosa Moist  Neck: Supple  Lungs: Respiratory effort is normal  Cardiovascular: Warm & Pink  Abdomen: Soft, non-tender, non-distended with no CVA,  No flank tenderness,  Or hepatosplenomegaly   Lymphatics: No palpable lymphadenopathy. Bladder non-tender and not distended. Musculoskeletal: Normalgait and station      and Plan      1. Kidney stone    2. Recurrent UTI           Plan:         She has not had any recent issues. stone is non obstructing. They are not interested in treatment at this time. Treat infections as needed. Discussed cysto and they want to hold off for now. F/U in 1 year. Return in about 1 year (around 9/14/2022). Prescriptions Ordered:  No orders of the defined types were placed in this encounter. Orders Placed:  No orders of the defined types were placed in this encounter. Dayo Bell MD    Agree with the ROS entered by the MA.

## 2021-09-14 NOTE — PROGRESS NOTES
Review of Systems   Constitutional: Negative for appetite change, chills and fatigue. Eyes: Negative for pain, redness and visual disturbance. Respiratory: Positive for cough (Chronic dry throat). Negative for shortness of breath and wheezing. Cardiovascular: Negative for chest pain and leg swelling. Gastrointestinal: Positive for diarrhea. Negative for abdominal pain, constipation, nausea and vomiting. Genitourinary: Negative for difficulty urinating, dysuria, flank pain, frequency, hematuria and urgency. Musculoskeletal: Negative for back pain and myalgias. Skin: Negative for rash and wound. Neurological: Negative for dizziness, tremors, weakness and numbness. Hematological: Does not bruise/bleed easily. All other systems reviewed and are negative.

## 2021-09-14 NOTE — LETTER
[address] [address]  Dept: [phone]  Dept Fax: [phone]        9/14/21    Patient: Dharmesh Nelson  YOB: 1950    Dear Elizabeth Kaufman MD,    I had the pleasure of seeing one of your patients, Jesus NELSON today in the office today. Below are the relevant portions of my assessment and plan of care. IMPRESSION:  1. Kidney stone    2. Recurrent UTI        PLAN:  She has not had any recent issues. stone is non obstructing. They are not interested in treatment at this time. Treat infections as needed. Discussed cysto and they want to hold off for now. F/U in 1 year. Return in about 1 year (around 9/14/2022). Prescriptions Ordered:  No orders of the defined types were placed in this encounter. Orders Placed:  No orders of the defined types were placed in this encounter. Thank you for allowing me to participate in the care of this patient. I will keep you updated on this patient's follow up and I look forward to serving you and your patients again in the future.         Kasia Doran MD

## 2021-09-22 ENCOUNTER — OFFICE VISIT (OUTPATIENT)
Dept: FAMILY MEDICINE CLINIC | Age: 71
End: 2021-09-22
Payer: MEDICARE

## 2021-09-22 VITALS
HEART RATE: 70 BPM | TEMPERATURE: 96.5 F | OXYGEN SATURATION: 98 % | BODY MASS INDEX: 26.69 KG/M2 | WEIGHT: 132.4 LBS | HEIGHT: 59 IN | SYSTOLIC BLOOD PRESSURE: 124 MMHG | DIASTOLIC BLOOD PRESSURE: 72 MMHG

## 2021-09-22 DIAGNOSIS — N18.31 TYPE 2 DIABETES MELLITUS WITH STAGE 3A CHRONIC KIDNEY DISEASE, WITHOUT LONG-TERM CURRENT USE OF INSULIN (HCC): Primary | ICD-10-CM

## 2021-09-22 DIAGNOSIS — N18.30 BENIGN HYPERTENSION WITH CKD (CHRONIC KIDNEY DISEASE) STAGE III (HCC): ICD-10-CM

## 2021-09-22 DIAGNOSIS — E55.9 VITAMIN D DEFICIENCY: ICD-10-CM

## 2021-09-22 DIAGNOSIS — E11.22 TYPE 2 DIABETES MELLITUS WITH STAGE 3A CHRONIC KIDNEY DISEASE, WITHOUT LONG-TERM CURRENT USE OF INSULIN (HCC): Primary | ICD-10-CM

## 2021-09-22 DIAGNOSIS — I50.32 CHRONIC DIASTOLIC CHF (CONGESTIVE HEART FAILURE) (HCC): ICD-10-CM

## 2021-09-22 DIAGNOSIS — E78.5 HYPERLIPIDEMIA WITH TARGET LDL LESS THAN 70: ICD-10-CM

## 2021-09-22 DIAGNOSIS — I12.9 BENIGN HYPERTENSION WITH CKD (CHRONIC KIDNEY DISEASE) STAGE III (HCC): ICD-10-CM

## 2021-09-22 PROCEDURE — 1036F TOBACCO NON-USER: CPT | Performed by: FAMILY MEDICINE

## 2021-09-22 PROCEDURE — 4040F PNEUMOC VAC/ADMIN/RCVD: CPT | Performed by: FAMILY MEDICINE

## 2021-09-22 PROCEDURE — 3017F COLORECTAL CA SCREEN DOC REV: CPT | Performed by: FAMILY MEDICINE

## 2021-09-22 PROCEDURE — G8400 PT W/DXA NO RESULTS DOC: HCPCS | Performed by: FAMILY MEDICINE

## 2021-09-22 PROCEDURE — G8427 DOCREV CUR MEDS BY ELIG CLIN: HCPCS | Performed by: FAMILY MEDICINE

## 2021-09-22 PROCEDURE — 99214 OFFICE O/P EST MOD 30 MIN: CPT | Performed by: FAMILY MEDICINE

## 2021-09-22 PROCEDURE — 2022F DILAT RTA XM EVC RTNOPTHY: CPT | Performed by: FAMILY MEDICINE

## 2021-09-22 PROCEDURE — 1090F PRES/ABSN URINE INCON ASSESS: CPT | Performed by: FAMILY MEDICINE

## 2021-09-22 PROCEDURE — G8417 CALC BMI ABV UP PARAM F/U: HCPCS | Performed by: FAMILY MEDICINE

## 2021-09-22 PROCEDURE — 3044F HG A1C LEVEL LT 7.0%: CPT | Performed by: FAMILY MEDICINE

## 2021-09-22 PROCEDURE — 1123F ACP DISCUSS/DSCN MKR DOCD: CPT | Performed by: FAMILY MEDICINE

## 2021-09-22 ASSESSMENT — ENCOUNTER SYMPTOMS
DIARRHEA: 1
NAUSEA: 0
SHORTNESS OF BREATH: 0
ABDOMINAL PAIN: 0
ROS SKIN COMMENTS: DRY SKIN
CHEST TIGHTNESS: 0
BLOOD IN STOOL: 0
ABDOMINAL DISTENTION: 0
WHEEZING: 0
COUGH: 0
VOMITING: 0
CONSTIPATION: 0
BACK PAIN: 1

## 2021-09-22 ASSESSMENT — PATIENT HEALTH QUESTIONNAIRE - PHQ9
SUM OF ALL RESPONSES TO PHQ QUESTIONS 1-9: 0
2. FEELING DOWN, DEPRESSED OR HOPELESS: 0
SUM OF ALL RESPONSES TO PHQ QUESTIONS 1-9: 0
SUM OF ALL RESPONSES TO PHQ9 QUESTIONS 1 & 2: 0
SUM OF ALL RESPONSES TO PHQ QUESTIONS 1-9: 0
1. LITTLE INTEREST OR PLEASURE IN DOING THINGS: 0

## 2021-09-22 NOTE — PROGRESS NOTES
Visit Information    Have you changed or started any medications since your last visit including any over-the-counter medicines, vitamins, or herbal medicines? no   Have you stopped taking any of your medications? Is so, why? -  no  Are you having any side effects from any of your medications? - no    Have you seen any other physician or provider since your last visit? yes -    Have you had any other diagnostic tests since your last visit?  no   Have you been seen in the emergency room and/or had an admission in a hospital since we last saw you?  no   Have you had your routine dental cleaning in the past 6 months?  no     Do you have an active MyChart account? If no, what is the barrier?   Yes    Patient Care Team:  Catherine Douglas MD as PCP - General (Family Medicine)  Catherine Douglas MD as PCP - [facility]  Francy Hwang MD as Consulting Physician (Rheumatology)  Mare Zapata MD as Surgeon (Cardiology)  Miguel Cary MD as Consulting Physician (Nephrology)  Dayami Lorenzo MD as Consulting Physician (Neurology)  Miguel Cary MD as Consulting Physician (Nephrology)    Medical History Review  Past Medical, Family, and Social History reviewed and does contribute to the patient presenting condition    Health Maintenance   Topic Date Due    COVID-19 Vaccine (1) Never done    DTaP/Tdap/Td vaccine (1 - Tdap) Never done    Shingles Vaccine (1 of 2) Never done    Pneumococcal 65+ years Vaccine (1 of 1 - PPSV23) Never done    Colon Cancer Screen FIT/FOBT  03/29/2018    Diabetic foot exam  10/01/2019    Annual Wellness Visit (AWV)  Never done    Lipid screen  07/27/2021    Flu vaccine (1) Never done    Diabetic retinal exam  10/19/2021    A1C test (Diabetic or Prediabetic)  06/29/2022    Potassium monitoring  06/29/2022    Creatinine monitoring  06/29/2022    Breast cancer screen  08/28/2022    DEXA (modify frequency per FRAX score)  Completed    Hepatitis C screen  Completed    Hepatitis A vaccine  Aged Out    Hib vaccine  Aged Out    Meningococcal (ACWY) vaccine  Aged Out

## 2021-09-22 NOTE — PATIENT INSTRUCTIONS
Patient Education        Learning About Carbohydrate (Carb) Counting and Eating Out When You Have Diabetes  Why plan your meals? Meal planning can be a key part of managing diabetes. Planning meals and snacks with the right balance of carbohydrate, protein, and fat can help you keep your blood sugar at the target level you set with your doctor. You don't have to eat special foods. You can eat what your family eats, including sweets once in a while. But you do have to pay attention to how often you eat and how much you eat of certain foods. You may want to work with a dietitian or a certified diabetes educator. He or she can give you tips and meal ideas and can answer your questions about meal planning. This health professional can also help you reach a healthy weight if that is one of your goals. What should you know about eating carbs? Managing the amount of carbohydrate (carbs) you eat is an important part of healthy meals when you have diabetes. Carbohydrate is found in many foods. · Learn which foods have carbs. And learn the amounts of carbs in different foods. ? Bread, cereal, pasta, and rice have about 15 grams of carbs in a serving. A serving is 1 slice of bread (1 ounce), ½ cup of cooked cereal, or 1/3 cup of cooked pasta or rice. ? Fruits have 15 grams of carbs in a serving. A serving is 1 small fresh fruit, such as an apple or orange; ½ of a banana; ½ cup of cooked or canned fruit; ½ cup of fruit juice; 1 cup of melon or raspberries; or 2 tablespoons of dried fruit. ? Milk and no-sugar-added yogurt have 15 grams of carbs in a serving. A serving is 1 cup of milk or 2/3 cup of no-sugar-added yogurt. ? Starchy vegetables have 15 grams of carbs in a serving. A serving is ½ cup of mashed potatoes or sweet potato; 1 cup winter squash; ½ of a small baked potato; ½ cup of cooked beans; or ½ cup cooked corn or green peas.   · Learn how much carbs to eat each day and at each meal. A dietitian or CDE can teach you how to keep track of the amount of carbs you eat. This is called carbohydrate counting. · If you are not sure how to count carbohydrate grams, use the Plate Method to plan meals. It is a good, quick way to make sure that you have a balanced meal. It also helps you spread carbs throughout the day. ? Divide your plate by types of foods. Put non-starchy vegetables on half the plate, meat or other protein food on one-quarter of the plate, and a grain or starchy vegetable in the final quarter of the plate. To this you can add a small piece of fruit and 1 cup of milk or yogurt, depending on how many carbs you are supposed to eat at a meal.  · Try to eat about the same amount of carbs at each meal. Do not \"save up\" your daily allowance of carbs to eat at one meal.  · Proteins have very little or no carbs per serving. Examples of proteins are beef, chicken, turkey, fish, eggs, tofu, cheese, cottage cheese, and peanut butter. A serving size of meat is 3 ounces, which is about the size of a deck of cards. Examples of meat substitute serving sizes (equal to 1 ounce of meat) are 1/4 cup of cottage cheese, 1 egg, 1 tablespoon of peanut butter, and ½ cup of tofu. How can you eat out and still eat healthy? · Learn to estimate the serving sizes of foods that have carbohydrate. If you measure food at home, it will be easier to estimate the amount in a serving of restaurant food. · If the meal you order has too much carbohydrate (such as potatoes, corn, or baked beans), ask to have a low-carbohydrate food instead. Ask for a salad or green vegetables. · If you use insulin, check your blood sugar before and after eating out to help you plan how much to eat in the future. · If you eat more carbohydrate at a meal than you had planned, take a walk or do other exercise. This will help lower your blood sugar. What are some tips for eating healthy? · Limit saturated fat, such as the fat from meat and dairy products.  This is a healthy choice because people who have diabetes are at higher risk of heart disease. So choose lean cuts of meat and nonfat or low-fat dairy products. Use olive or canola oil instead of butter or shortening when cooking. · Don't skip meals. Your blood sugar may drop too low if you skip meals and take insulin or certain medicines for diabetes. · Check with your doctor before you drink alcohol. Alcohol can cause your blood sugar to drop too low. Alcohol can also cause a bad reaction if you take certain diabetes medicines. Follow-up care is a key part of your treatment and safety. Be sure to make and go to all appointments, and call your doctor if you are having problems. It's also a good idea to know your test results and keep a list of the medicines you take. Where can you learn more? Go to https://Brandlepevaleriaeb.Renovagen. org and sign in to your [employer] account. Enter J316 in the Invisible box to learn more about \"Learning About Carbohydrate (Carb) Counting and Eating Out When You Have Diabetes. \"     If you do not have an account, please click on the \"Sign Up Now\" link. Current as of: August 31, 2020               Content Version: 12.9  © 2006-2021 Healthwise, [employer]. Care instructions adapted under license by [facility] [place] ([facility]). If you have questions about a medical condition or this instruction, always ask your healthcare professional. [place] [address] any warranty or liability for your use of this information. Patient Education        Low Sodium Diet (2,000 Milligram): Care Instructions  Overview     Limiting sodium can be an important part of managing some health problems. The most common source of sodium is salt. People get most of the salt in their diet from canned, prepared, and packaged foods. Fast food and restaurant meals also are very high in sodium. Your doctor will probably limit your sodium to less than 2,000 milligrams (mg) a day.  This limit counts all the sodium in prepared and packaged foods and any salt you add to your food. Follow-up care is a key part of your treatment and safety. Be sure to make and go to all appointments, and call your doctor if you are having problems. It's also a good idea to know your test results and keep a list of the medicines you take. How can you care for yourself at home? Read food labels  · Read labels on cans and food packages. The labels tell you how much sodium is in each serving. Make sure that you look at the serving size. If you eat more than the serving size, you have eaten more sodium. · Food labels also tell you the Percent Daily Value for sodium. Choose products with low Percent Daily Values for sodium. · Be aware that sodium can come in forms other than salt, including monosodium glutamate (MSG), sodium citrate, and sodium bicarbonate (baking soda). MSG is often added to Asian food. When you eat out, you can sometimes ask for food without MSG or added salt. Buy low-sodium foods  · Buy foods that are labeled \"unsalted\" (no salt added), \"sodium-free\" (less than 5 mg of sodium per serving), or \"low-sodium\" (140 mg or less of sodium per serving). Foods labeled \"reduced-sodium\" and \"light sodium\" may still have too much sodium. Be sure to read the label to see how much sodium you are getting. · Buy fresh vegetables, or frozen vegetables without added sauces. Buy low-sodium versions of canned vegetables, soups, and other canned goods. Prepare low-sodium meals  · Cut back on the amount of salt you use in cooking. This will help you adjust to the taste. Do not add salt after cooking. One teaspoon of salt has about 2,300 mg of sodium. · Take the salt shaker off the table. · Flavor your food with garlic, lemon juice, onion, vinegar, herbs, and spices. Do not use soy sauce, lite soy sauce, steak sauce, onion salt, garlic salt, celery salt, or ketchup on your food.   · Use low-sodium salad dressings, sauces, and ketchup. Or make your own salad dressings and sauces without adding salt. · Use less salt (or none) when recipes call for it. You can often use half the salt a recipe calls for without losing flavor. Other foods such as rice, pasta, and grains do not need added salt. · Rinse canned vegetables, and cook them in fresh water. This removes somebut not allof the salt. · Avoid water that is naturally high in sodium or that has been treated with water softeners, which add sodium. If you buy bottled water, read the label and choose a sodium-free brand. Avoid high-sodium foods  · Avoid eating:  ? Smoked, cured, salted, and canned meat, fish, and poultry. ? Ham, de león, hot dogs, and luncheon meats. ? Regular, hard, and processed cheese and regular peanut butter. ? Crackers with salted tops, and other salted snack foods such as pretzels, chips, and salted popcorn. ? Frozen prepared meals, unless labeled low-sodium. ? Canned and dried soups, broths, and bouillon, unless labeled sodium-free or low-sodium. ? Canned vegetables, unless labeled sodium-free or low-sodium. ? [place] fries, pizza, tacos, and other fast foods. ? Pickles, olives, ketchup, and other condiments, especially soy sauce, unless labeled sodium-free or low-sodium. Where can you learn more? Go to https://Fandium.FEMA Guides. org and sign in to your TriCipher account. Enter P502 in the [facility] box to learn more about \"Low Sodium Diet (2,000 Milligram): Care Instructions. \"     If you do not have an account, please click on the \"Sign Up Now\" link. Current as of: December 17, 2020               Content Version: 12.9  © 4022-0153 Healthwise, Incorporated. Care instructions adapted under license by [facility] ([facility]). If you have questions about a medical condition or this instruction, always ask your healthcare professional. [place] [address] any warranty or liability for your use of this information.          Patient limit sodium. Your doctor can tell you how much sodium is right for you. An example is less than 3,000 mg a day. This includes all the salt you eat in cooking or in packaged foods. People get most of their sodium from processed foods. Fast food and restaurant meals also tend to be very high in sodium.     · Ask your doctor how much liquid you can drink each day. You may have to limit liquids. Weight    · Weigh yourself without clothing at the same time each day. Record your weight. Call your doctor if you have a sudden weight gain, such as more than 2 to 3 pounds in a day or 5 pounds in a week. (Your doctor may suggest a different range of weight gain.) A sudden weight gain may mean that your heart failure is getting worse. Activity level    · Start light exercise (if your doctor says it is okay). Even if you can only do a small amount, exercise will help you get stronger, have more energy, and manage your weight and your stress. Walking is an easy way to get exercise. Start out by walking a little more than you did before. Bit by bit, increase the amount you walk.     · When you exercise, watch for signs that your heart is working too hard. You are pushing yourself too hard if you cannot talk while you are exercising. If you become short of breath or dizzy or have chest pain, stop, sit down, and rest.     · If you feel \"wiped out\" the day after you exercise, walk slower or for a shorter distance until you can work up to a better pace.     · Get enough rest at night. Sleeping with 1 or 2 pillows under your upper body and head may help you breathe easier. Lifestyle changes    · Do not smoke. Smoking can make a heart condition worse. If you need help quitting, talk to your doctor about stop-smoking programs and medicines. These can increase your chances of quitting for good.  Quitting smoking may be the most important step you can take to protect your heart.     · Limit alcohol to 2 drinks a day for men and 1 drink a day for women. Too much alcohol can cause health problems.     · Avoid getting sick from colds and the flu. Get a pneumococcal vaccine shot. If you have had one before, ask your doctor whether you need another dose. Get a flu shot each year. If you must be around people with colds or the flu, wash your hands often. When should you call for help? Call 911  if you have symptoms of sudden heart failure such as:    · You have severe trouble breathing.     · You cough up pink, foamy mucus.     · You have a new irregular or rapid heartbeat. Call your doctor now or seek immediate medical care if:    · You have new or increased shortness of breath.     · You are dizzy or lightheaded, or you feel like you may faint.     · You have sudden weight gain, such as more than 2 to 3 pounds in a day or 5 pounds in a week. (Your doctor may suggest a different range of weight gain.)     · You have increased swelling in your legs, ankles, or feet.     · You are suddenly so tired or weak that you cannot do your usual activities. Watch closely for changes in your health, and be sure to contact your doctor if you develop new symptoms. Where can you learn more? Go to https://modu.Conceptua Math. org and sign in to your [employer] account. Enter M271 in the Skylines box to learn more about \"Heart Failure: Care Instructions. \"     If you do not have an account, please click on the \"Sign Up Now\" link. Current as of: August 31, 2020               Content Version: 12.9  © 2006-2021 [employer]. Care instructions adapted under license by [facility] [place] ([facility]). If you have questions about a medical condition or this instruction, always ask your healthcare professional. [place] [address] any warranty or liability for your use of this information.          Patient Education        Kidney Stone: Care Instructions  Your Care Instructions     Kidney stones are formed when salts, minerals, and other substances normally found in the urine clump together. They can be as small as grains of sand or, rarely, as large as golf balls. While the stone is traveling through the ureter, which is the tube that carries urine from the kidney to the bladder, you will probably feel pain. The pain may be mild or very severe. You may also have some blood in your urine. As soon as the stone reaches the bladder, any intense pain should go away. If a stone is too large to pass on its own, you may need a medical procedure to help you pass the stone. The doctor has checked you carefully, but problems can develop later. If you notice any problems or new symptoms, get medical treatment right away. Follow-up care is a key part of your treatment and safety. Be sure to make and go to all appointments, and call your doctor if you are having problems. It's also a good idea to know your test results and keep a list of the medicines you take. How can you care for yourself at home? · Drink plenty of fluids. If you have kidney, heart, or liver disease and have to limit fluids, talk with your doctor before you increase the amount of fluids you drink. · Take pain medicines exactly as directed. Call your doctor if you think you are having a problem with your medicine. ? If the doctor gave you a prescription medicine for pain, take it as prescribed. ? If you are not taking a prescription pain medicine, ask your doctor if you can take an over-the-counter medicine. Read and follow all instructions on the label. · Your doctor may ask you to strain your urine so that you can collect your kidney stone when it passes. You can use a kitchen strainer or a tea strainer to catch the stone. Store it in a plastic bag until you see your doctor again. Preventing future kidney stones  Some changes in your diet may help prevent kidney stones.  Depending on the cause of your stones, your doctor may recommend that you:  · Drink plenty of fluids, enough so that your urine is light yellow or clear like water. If you have kidney, heart, or liver disease and have to limit fluids, talk with your doctor before you increase the amount of fluids you drink. · Limit coffee, tea, and alcohol. Also avoid grapefruit juice. · Do not take more than the recommended daily dose of vitamins C and D.  · Avoid antacids such as Gaviscon, Maalox, Mylanta, or Tums. · Limit the amount of salt (sodium) in your diet. · Eat a balanced diet that is not too high in protein. · Limit foods that are high in a substance called oxalate, which can cause kidney stones. These foods include dark green vegetables, rhubarb, chocolate, wheat bran, nuts, cranberries, and beans. When should you call for help? Call your doctor now or seek immediate medical care if:    · You cannot keep down fluids.     · Your pain gets worse.     · You have a fever or chills.     · You have new or worse pain in your back just below your rib cage (the flank area).     · You have new or more blood in your urine. Watch closely for changes in your health, and be sure to contact your doctor if:    · You do not get better as expected. Where can you learn more? Go to https://SpeakPhone.CSMG. org and sign in to your wutabout account. Enter S537 in the Compiere box to learn more about \"Kidney Stone: Care Instructions. \"     If you do not have an account, please click on the \"Sign Up Now\" link. Current as of: December 17, 2020               Content Version: 12.9  © 0509-8546 Healthwise, Incorporated. Care instructions adapted under license by [facility]. If you have questions about a medical condition or this instruction, always ask your healthcare professional. [place] [address] any warranty or liability for your use of this information.

## 2021-09-22 NOTE — PROGRESS NOTES
Nikkie Babin (:  1950) is a 66 y.o. female,Established patient, here for evaluation of the following chief complaint(s): Discuss Medications (DID NOT BRING IN CURRENT LIST/ WILL CHECK WITH LIST AT [address] [place] [address] LAST VISIT 2021 BUT UNSURE), Immunizations (NO TO COVID-19 VACCINE/NO TO FLU VACCINE), Diabetes, Hypertension, and Hyperlipidemia      ASSESSMENT/PLAN:    1. Type 2 diabetes mellitus with stage 3a chronic kidney disease, without long-term current use of insulin ([facility])  improving    Lab Results   Component Value Date    LABA1C 5.7 2021    LABA1C 5.9 2021    LABA1C 6.6 (H) 10/27/2020       -      DIABETES FOOT EXAM  -     CBC; Future  -     Comprehensive Metabolic Panel; Future  -     TSH without Reflex; Future  -     Vitamin B12 & Folate; Future  -advised home blood glucose testing  daily  -daily feet exam, Foot care: advised to wash feet daily, pat dry and apply lotion at night, avoiding between toes. Need to look at feet daily and report to a physician any signs of inflammation or skin damage  -annual dilated eye exam  -Low carb, low fat diet, increase fruits and vegetables, and exercise 4-5 times a day 30-40 minutes a day discussed  -Okay to stop Metformin as her diabetes is well controlled and continues to improve  Compliance with low-carb with discussed  -continue losartan / ARB and statin Lipitor      2. Chronic diastolic CHF (congestive heart failure) ([facility])  Stable  Lab Results   Component Value Date    LVEF 48 2020     Continue furosemide 20 mg daily, losartan 25 mg, metoprolol 50 mg twice a day, Aldactone 25 mg  We will recheck her labs  Self-monitoring blood pressure, weight and pulse discussed  Follow-up with cardiologist      -     CBC; Future  -     Comprehensive Metabolic Panel; Future  -     TSH without Reflex; Future  -     Brain Natriuretic Peptide; Future  3. HTN.  Benign hypertension with CKD (chronic kidney disease) stage III schedule at the pharmacy  Declines pneumonia vaccine      Patient having kidney stone, non obstructive  Saw Dr. Cervantes, will monitor this   No intervention, she declines any intervention due to being a high risk due to her age, multiple comorbidities and medications. CT abdomen 5/25/21  Danyel Jason is a nonobstructing calculus measuring up   to 1.5 cm in the right kidney.  No ureteral calculus. Diabetes Mellitus Type II, Follow-up:    Current symptoms/problems include hyperglycemia, paresthesia of the feet and visual disturbances. Symptoms have been present for several years. Known diabetic complications: cardiovascular disease, cerebrovascular disease and Chronic kidney disease, coronary artery disease, stroke  Cardiovascular risk factors: advanced age (older than 54 for men, 72 for women), diabetes mellitus, dyslipidemia and hypertension    Medication compliance:  compliant all of the time  Current diabetic medications include: Metformin XR    Eye exam current (within one year): no, advised to schedule appointment with eye doctor. Current diet: in general, a \"healthy\" diet      Barriers: impairment:  hearing and physical: Difficulty ambulating, ambulates with walker, has multiple comorbidities, advanced age, difficulty understanding, her daughter repeats what I say multiple times. Current monitoring regimen: Has been checking a few times a week  Home blood sugar records: fasting range: 80s to low 100s  Any episodes of hypoglycemia? no    Is She on ACE inhibitor or angiotensin II receptor blocker? Yes      reports that she quit smoking about 21 years ago. She has a 2.50 pack-year smoking history. She has never used smokeless tobacco.     Counseling given: Yes      Daily Aspirin? No      A1c is 5.7 very well controlled and improving diabetes.    Lab Results   Component Value Date    LABA1C 5.7 06/29/2021    LABA1C 5.9 03/12/2021    LABA1C 6.6 (H) 10/27/2020      LABA1C 8.3 (H) 07/27/2020 Urine microabumin is Elevated, worsening she is on losartan, she does see nephrologist  Lab Results   Component Value Date    LABMICR 9,075 (H) 03/18/2021      Stable weight    Wt Readings from Last 3 Encounters:   09/22/21 132 lb 6.4 oz (60.1 kg)   09/14/21 134 lb (60.8 kg)   06/29/21 134 lb (60.8 kg)         Hypertension, Chronic kidney disease stage 3, congestive heart failure, paroxysmal atrial fibrillation:   Xuan Farrar  is not  exercising and is adherent to low salt diet. Blood pressure is well controlled at home. Cardiac symptoms  fatigue and lower extremity edema. Patient denies  chest pain, chest pressure/discomfort, claudication, dyspnea, exertional chest pressure/discomfort, irregular heart beat, near-syncope, orthopnea, palpitations, paroxysmal nocturnal dyspnea, syncope and tachypnea. Use of agents associated with hypertension: none. History of target organ damage: chronic kidney disease, heart failure, stroke and Coronary artery disease, she does have stents and she follows with cardiologist.  Patient had new onset atrial fibrillation July 2020, which required admission, since then she has been on chronic anticoagulation      BP controlled. Xuan Farrar reports compliance with BP medications, and tolerates them well, denies side effects. BP Readings from Last 3 Encounters:   09/22/21 124/72   09/14/21 (!) 121/58   07/07/21 (!) 122/56        Pulse is Normal.    Pulse Readings from Last 3 Encounters:   09/22/21 70   09/14/21 71   07/07/21 77     Lab Results   Component Value Date    LVEF 48 07/30/2020             Hyperlipidemia:  No new myalgias or GI upset on atorvastatin (Lipitor). Medication compliance: compliant all of the time. Patient is  following a low fat, low cholesterol diet. LDL is Normal.  She is due to recheck lipid profile  Lab Results   Component Value Date    LDLCHOLESTEROL 52 07/27/2020     Xuan Farrar has Vitamin D deficiency.   Xuan Farrar  is  taking Vitamin D Effort: Pulmonary effort is normal. No respiratory distress. Breath sounds: Normal breath sounds. No wheezing or rales. Chest:      Chest wall: No tenderness. Abdominal:      General: Bowel sounds are normal. There is no distension. Palpations: Abdomen is soft. There is no hepatomegaly or splenomegaly. Tenderness: There is no abdominal tenderness. Musculoskeletal:         General: Deformity present. No tenderness. Cervical back: Normal range of motion and neck supple. Right lower le+ Pitting Edema present. Left lower le+ Pitting Edema present. Right foot: Decreased range of motion. Deformity and bunion present. No Charcot foot, foot drop or prominent metatarsal heads. Left foot: Decreased range of motion. Deformity and bunion present. No Charcot foot, foot drop or prominent metatarsal heads. Comments: Thoracic kyphosis   Feet:      Right foot:      Protective Sensation: 5 sites tested. 2 sites sensed. Skin integrity: Skin integrity normal.      Toenail Condition: Right toenails are abnormally thick. Left foot:      Protective Sensation: 5 sites tested. 2 sites sensed. Skin integrity: Skin integrity normal.      Toenail Condition: Left toenails are abnormally thick. Skin:     General: Skin is warm and dry. Capillary Refill: Capillary refill takes less than 2 seconds. Findings: Rash present. Comments: Light erythematous rash stasis dermatitis on both legs   Neurological:      Mental Status: She is alert and oriented to person, place, and time. Cranial Nerves: No cranial nerve deficit. Motor: No abnormal muscle tone. Gait: Gait abnormal.      Comments: Ambulates with walker   Psychiatric:         Mood and Affect: Mood is anxious. Behavior: Behavior normal.         Thought Content: Thought content normal.         Judgment: Judgment normal.           I personally reviewed testing with patient.   Chronic kidney disease stage III stable  4+ proteins in the urine, will start the recently on losartan by nephrologist, who is going to do more testing to her  Hematuria seeing urology for kidney stone  Blood glucose very well controlled  Vitamin D deficiency  Mild leukocytosis    Otherwise labs within normal limits    Hospital Outpatient Visit on 07/09/2021   Component Date Value Ref Range Status    Color, UA 07/09/2021 YELLOW  YELLOW Final    Turbidity UA 07/09/2021 CLEAR  CLEAR Final    Glucose, Ur 07/09/2021 TRACE* NEGATIVE Final    Bilirubin Urine 07/09/2021 NEGATIVE  NEGATIVE Final    Ketones, Urine 07/09/2021 NEGATIVE  NEGATIVE Final    Specific Gravity, UA 07/09/2021 1.016  1.005 - 1.030 Final    Urine Hgb 07/09/2021 LARGE* NEGATIVE Final    pH, UA 07/09/2021 6.0  5.0 - 8.0 Final    Protein, UA 07/09/2021 4+* NEGATIVE Final    Urobilinogen, Urine 07/09/2021 Normal  Normal Final    Nitrite, Urine 07/09/2021 NEGATIVE  NEGATIVE Final    Leukocyte Esterase, Urine 07/09/2021 TRACE* NEGATIVE Final    - 07/09/2021        Final    WBC, UA 07/09/2021 20 TO 50  0 - 5 /HPF Final    RBC, UA 07/09/2021 TOO NUMEROUS TO COUNT  0 - 4 /HPF Final    Reference range defined for non-centrifuged specimen.  Casts UA 07/09/2021 5 TO 10 HYALINE Reference range defined for non-centrifuged specimen. 0 - 8 /LPF Final    Crystals, UA 07/09/2021 NOT REPORTED  None /HPF Final    Epithelial Cells UA 07/09/2021 2 TO 5  0 - 5 /HPF Final    Renal Epithelial, UA 07/09/2021 NOT REPORTED  0 /HPF Final    Bacteria, UA 07/09/2021 NOT REPORTED  None Final    Mucus, UA 07/09/2021 NOT REPORTED  None Final    Trichomonas, UA 07/09/2021 NOT REPORTED  None Final    Amorphous, UA 07/09/2021 NOT REPORTED  None Final    Other Observations UA 07/09/2021 NOT REPORTED  NOT REQ.  Final    Yeast, UA 07/09/2021 NOT REPORTED  None Final       Lab Results   Component Value Date    WBC 12.6 (H) 05/27/2021    HGB 13.2 05/27/2021    HCT 40.9 05/27/2021 MCV 92.6 05/27/2021     05/27/2021       Lab Results   Component Value Date     06/29/2021    K 4.0 06/29/2021     06/29/2021    CO2 26 06/29/2021    BUN 40 06/29/2021    CREATININE 1.02 06/29/2021    GLUCOSE 96 06/29/2021    CALCIUM 8.7 06/29/2021        Lab Results   Component Value Date    ALT 11 05/25/2021    AST 22 05/25/2021    ALKPHOS 96 05/25/2021    BILITOT 0.35 05/25/2021       Lab Results   Component Value Date    TSH 2.06 05/25/2021       Lab Results   Component Value Date    CHOL 117 07/27/2020    CHOL 188 03/11/2020    CHOL 159 02/27/2019     Lab Results   Component Value Date    TRIG 112 07/27/2020    TRIG 279 (H) 03/11/2020    TRIG 214 (H) 02/27/2019     Lab Results   Component Value Date    HDL 48 07/27/2020    HDL 55 03/11/2020    HDL 48 02/27/2019     Lab Results   Component Value Date    LDLCHOLESTEROL 47 07/27/2020    LDLCHOLESTEROL 77 03/11/2020    LDLCHOLESTEROL 68 02/27/2019     Lab Results   Component Value Date    CHOLHDLRATIO 2.4 07/27/2020    CHOLHDLRATIO 3.4 03/11/2020    CHOLHDLRATIO 3.3 02/27/2019         Lab Results   Component Value Date    GFWSJTLZ62 717 03/18/2021     Lab Results   Component Value Date    FOLATE >20.0 03/18/2021     Lab Results   Component Value Date    VITD25 19.0 (L) 03/18/2021     Lab Results   Component Value Date    URICACID 5.5 03/11/2020         No orders of the defined types were placed in this encounter.       Orders Placed This Encounter   Procedures    CBC     Standing Status:   Future     Standing Expiration Date:   9/22/2022    Comprehensive Metabolic Panel     Standing Status:   Future     Standing Expiration Date:   9/22/2022    Magnesium     Standing Status:   Future     Standing Expiration Date:   9/22/2022    Phosphorus     Standing Status:   Future     Standing Expiration Date:   9/22/2022    TSH without Reflex     Standing Status:   Future     Standing Expiration Date:   9/22/2022    Vitamin B12 & Folate     Standing Status:   Future     Standing Expiration Date:   11/12/2021    Vitamin D 25 Hydroxy     Standing Status:   Future     Standing Expiration Date:   9/22/2022    Lipid Panel     Standing Status:   Future     Standing Expiration Date:   9/22/2022     Order Specific Question:   Is Patient Fasting?/# of Hours     Answer:   8-10 Hours, water ok to drink    Brain Natriuretic Peptide     Standing Status:   Future     Standing Expiration Date:   9/22/2022     DIABETES FOOT EXAM       Medications Discontinued During This Encounter   Medication Reason    magnesium oxide (MAG-OX) 400 (241.3 Mg) MG TABS tablet DUPLICATE    metFORMIN (GLUCOPHAGE) 500 MG tablet Stop Taking at Discharge         On this date 9/22/2021 I have spent 35 minutes reviewing previous notes, test results and face to face with the patient discussing the diagnosis and importance of compliance with the treatment plan as well as documenting on the day of the visit and care coordination with her daughter. This note was completed by using the assistance of a speech-recognition program. However, inadvertent computerized transcription errors may be present. Although every effort was made to ensure accuracy, no guarantees can be provided that every mistake has been identified and corrected by editing . An electronic signature was used to authenticate this note.   Electronically signed by Kera Barron MD on 9/27/2021 at 4:28 PM

## 2021-09-29 DIAGNOSIS — E55.9 VITAMIN D DEFICIENCY: ICD-10-CM

## 2021-09-29 RX ORDER — ERGOCALCIFEROL 1.25 MG/1
50000 CAPSULE ORAL WEEKLY
Qty: 12 CAPSULE | Refills: 0 | Status: SHIPPED | OUTPATIENT
Start: 2021-09-29 | End: 2021-10-12 | Stop reason: SDUPTHER

## 2021-10-12 DIAGNOSIS — E55.9 VITAMIN D DEFICIENCY: ICD-10-CM

## 2021-10-12 DIAGNOSIS — I25.10 CORONARY ARTERY DISEASE INVOLVING NATIVE CORONARY ARTERY OF NATIVE HEART WITHOUT ANGINA PECTORIS: ICD-10-CM

## 2021-10-12 DIAGNOSIS — I50.32 CHRONIC DIASTOLIC CHF (CONGESTIVE HEART FAILURE) (HCC): Primary | ICD-10-CM

## 2021-10-12 DIAGNOSIS — I12.9 BENIGN HYPERTENSION WITH CKD (CHRONIC KIDNEY DISEASE) STAGE III (HCC): ICD-10-CM

## 2021-10-12 DIAGNOSIS — N18.30 BENIGN HYPERTENSION WITH CKD (CHRONIC KIDNEY DISEASE) STAGE III (HCC): ICD-10-CM

## 2021-10-12 RX ORDER — LOSARTAN POTASSIUM 25 MG/1
25 TABLET ORAL DAILY
Qty: 90 TABLET | Refills: 3 | Status: SHIPPED | OUTPATIENT
Start: 2021-10-12 | End: 2022-01-15 | Stop reason: HOSPADM

## 2021-10-12 RX ORDER — ERGOCALCIFEROL 1.25 MG/1
50000 CAPSULE ORAL WEEKLY
Qty: 12 CAPSULE | Refills: 0 | Status: SHIPPED | OUTPATIENT
Start: 2021-10-12 | End: 2021-10-12 | Stop reason: SDUPTHER

## 2021-10-12 RX ORDER — CLOPIDOGREL BISULFATE 75 MG/1
75 TABLET ORAL DAILY
Qty: 90 TABLET | Refills: 3 | Status: SHIPPED | OUTPATIENT
Start: 2021-10-12 | End: 2022-02-15 | Stop reason: SDUPTHER

## 2021-10-12 NOTE — TELEPHONE ENCOUNTER
----- Message from Gabriela Lanes sent at 10/12/2021 10:12 AM EDT -----  Subject: Refill Request    QUESTIONS  Name of Medication? losartan (COZAAR) 25 MG tablet  Patient-reported dosage and instructions? Take 1 tablet by mouth daily  How many days do you have left? 3  Preferred Pharmacy? [id number] [facility] [place] [place]  Pharmacy phone number (if available)? [phone]  Additional Information for Provider? Patient needs a temporary 30 day   prescription sent to [facility]. She also needs the doctor to send the full   script over to [facility], it takes them 30 days to fill it she says her   doctor normally does the 30 day temporary and sends the full prescription   over to [facility] so they can fill them.   ---------------------------------------------------------------------------  --------------,  Name of Medication? clopidogrel (PLAVIX) 75 MG tablet  Patient-reported dosage and instructions? Take 1 tablet by mouth daily  How many days do you have left? 3  Preferred Pharmacy? [id number] [facility] [place]  Pharmacy phone number (if available)? [phone]  Additional Information for Provider? Patient needs a temporary 30 day   prescription sent to [facility]. She also needs the doctor to send the full   script over to [facility], it takes them 30 days to fill it she says her   doctor normally does the 30 day temporary and sends the full prescription   over to [facility] so they can fill them.   ---------------------------------------------------------------------------  --------------,  Name of Medication? vitamin D (ERGOCALCIFEROL) 1.25 MG (80255 UT) CAPS   capsule  Patient-reported dosage and instructions? Take 1 capsule by mouth once a   week  How many days do you have left? 0  Preferred Pharmacy? [address] [facility]. [address]  Pharmacy phone number (if available)? [phone]  Additional Information for Provider? Patient needs a temporary 30 day   prescription sent to [facility].  She also needs the doctor to send the full   script over to [facility], it takes them 30 days to fill it she says her   doctor normally does the 30 day temporary and sends the full prescription   over to [facility] so they can fill them.   ---------------------------------------------------------------------------  --------------  [address]  What is the best way for the office to contact you? OK to leave message on   voicemail  Preferred Call Back Phone Number?  [phone]

## 2021-10-14 ENCOUNTER — TELEPHONE (OUTPATIENT)
Dept: FAMILY MEDICINE CLINIC | Age: 71
End: 2021-10-14

## 2021-10-14 NOTE — TELEPHONE ENCOUNTER
----- Message from Seda Dunham sent at 10/14/2021  1:53 PM EDT -----  Subject: Message to Provider    QUESTIONS  Information for Provider? hydroCHLOROthiazide 25 MG Patient is calling   asking if she is to still take this medication, she phone it in but   pharmacy said was not refilled. ---------------------------------------------------------------------------  --------------  Christianne HALL  What is the best way for the office to contact you? OK to leave message on   voicemail  Preferred Call Back Phone Number? [phone]  ---------------------------------------------------------------------------  --------------  SCRIPT ANSWERS  Relationship to Patient?  Self

## 2021-10-14 NOTE — TELEPHONE ENCOUNTER
----- Message from Josue Aldridge sent at 10/14/2021  1:47 PM EDT -----  Subject: Medication Problem    QUESTIONS  Name of Medication? amLODIPine (NORVASC) 5 MG tablet  Patient-reported dosage and instructions? Take 5 mg by mouth daily  What question or problem do you have with the medication? Patient said   they did not refill this medication, is she still to continue to take this   medication  Preferred Pharmacy? [address] [place] [address] [phone]  Pharmacy phone number (if available)? [phone]  Additional Information for Provider?   ---------------------------------------------------------------------------  --------------  [address]  What is the best way for the office to contact you? OK to leave message on   voicemail  Preferred Call Back Phone Number? [phone]  ---------------------------------------------------------------------------  --------------  SCRIPT ANSWERS  Relationship to Patient?  Self

## 2021-10-14 NOTE — TELEPHONE ENCOUNTER
Please let the patient know hydrochlorothiazide was discontinued    She is on furosemide right now 20 mg daily is a water pill          Medication  furosemide (LASIX) 20 MG tablet [3294]  furosemide (LASIX) 20 MG tablet [[id number]]     Order Details  Dose: 20 mg Route: Oral Frequency: DAILY   Dispense Quantity: 90 tablet Refills: 3          Sig: Take 1 tablet by mouth daily         Start Date: 07/07/21 End Date: --   Written Date: 07/07/21 Expiration Date: 07/07/22   Providers    Authorizing Provider: Haider Biswas MD NPI: [id number]   Ordering User:  Haider Biswas MD        Pharmacy    [facility] [address] - P [phone] - F [phone]   [address] [place] [address] [address] [place] [id number]   Phone:  [phone]  Fax:  [phone]

## 2021-10-15 DIAGNOSIS — I50.32 CHRONIC DIASTOLIC CHF (CONGESTIVE HEART FAILURE) (HCC): Primary | ICD-10-CM

## 2021-10-15 DIAGNOSIS — I12.9 BENIGN HYPERTENSION WITH CKD (CHRONIC KIDNEY DISEASE) STAGE III (HCC): ICD-10-CM

## 2021-10-15 DIAGNOSIS — N18.30 BENIGN HYPERTENSION WITH CKD (CHRONIC KIDNEY DISEASE) STAGE III (HCC): ICD-10-CM

## 2021-10-15 RX ORDER — AMLODIPINE BESYLATE 5 MG/1
5 TABLET ORAL DAILY
Qty: 90 TABLET | Refills: 3 | Status: ON HOLD | OUTPATIENT
Start: 2021-10-15 | End: 2022-02-10 | Stop reason: HOSPADM

## 2021-10-15 NOTE — TELEPHONE ENCOUNTER
Yes, she is on amlodipine 5 mg      Current Outpatient Medications on File Prior to Visit   Medication Sig Dispense Refill    vitamin D (ERGOCALCIFEROL) 1.25 MG (45659 UT) CAPS capsule Take 1 capsule by mouth once a week 24 capsule 0    clopidogrel (PLAVIX) 75 MG tablet Take 1 tablet by mouth daily 90 tablet 3    losartan (COZAAR) 25 MG tablet Take 1 tablet by mouth daily 90 tablet 3    amLODIPine (NORVASC) 5 MG tablet Take 5 mg by mouth daily      colchicine (COLCRYS) 0.6 MG tablet Take 0.6 mg by mouth daily      diclofenac sodium (VOLTAREN) 1 % GEL Apply 4 g topically 3 times daily      furosemide (LASIX) 20 MG tablet Take 1 tablet by mouth daily 90 tablet 3    metoprolol tartrate (LOPRESSOR) 50 MG tablet Take 1 tablet by mouth 2 times daily 180 tablet 3    miconazole (MICOTIN) 2 % powder Apply topically 2 times daily. 45 g 1    magnesium oxide (MAG-OX) 400 MG tablet Take 1 tablet by mouth 2 times daily 60 tablet 6    sodium bicarbonate 650 MG tablet Take 1 tablet by mouth 3 times daily 270 tablet 3    blood glucose test strips (FREESTYLE LITE) strip 1 each by In Vitro route daily As needed.  100 each 3    FreeStyle Lancets MISC 1 each by Does not apply route daily Patient needs to contact office before any further refills will be approved 90 each 3    desloratadine (CLARINEX) 5 MG tablet Take 1 tablet by mouth daily 90 tablet 3    allopurinol (ZYLOPRIM) 300 MG tablet Take 1 tablet by mouth daily Per rheumatologist 90 tablet 3    spironolactone (ALDACTONE) 25 MG tablet Take 1 tablet by mouth every morning (before breakfast) Water pill 90 tablet 3    atorvastatin (LIPITOR) 40 MG tablet Take 1 tablet by mouth daily 90 tablet 3    albuterol sulfate HFA (PROAIR HFA) 108 (90 Base) MCG/ACT inhaler Inhale 2 puffs into the lungs every 6 hours as needed for Wheezing or Shortness of Breath (cough) 8 g 3    apixaban (ELIQUIS) 5 MG TABS tablet Take 1 tablet by mouth 2 times daily 60 tablet 11    Multiple Vitamins-Minerals (THERAPEUTIC MULTIVITAMIN-MINERALS) tablet Take 1 tablet by mouth daily      Blood Pressure KIT 1 kit by Does not apply route three times daily 1 kit 0    blood glucose monitor kit and supplies 1 kit by Other route three times daily Dispense Butterfly Elite CBG Device 1 kit 0     No current facility-administered medications on file prior to visit.

## 2021-10-15 NOTE — TELEPHONE ENCOUNTER
Please Approve or Refuse.   Send to Pharmacy per Pt's Request:     Next Visit Date:  11/24/2021   Last Visit Date: 9/22/2021    Hemoglobin A1C (%)   Date Value   06/29/2021 5.7   03/12/2021 5.9   10/27/2020 6.6 (H)             ( goal A1C is < 7)   BP Readings from Last 3 Encounters:   09/22/21 124/72   09/14/21 (!) 121/58   07/07/21 (!) 122/56          (goal 120/80)  BUN   Date Value Ref Range Status   06/29/2021 40 (H) 8 - 23 mg/dL Final     CREATININE   Date Value Ref Range Status   06/29/2021 1.02 (H) 0.50 - 0.90 mg/dL Final     Potassium   Date Value Ref Range Status   06/29/2021 4.0 3.7 - 5.3 mmol/L Final

## 2021-11-09 DIAGNOSIS — J30.89 ALLERGIC RHINITIS DUE TO OTHER ALLERGIC TRIGGER, UNSPECIFIED SEASONALITY: Primary | ICD-10-CM

## 2021-11-10 RX ORDER — DESLORATADINE 5 MG/1
TABLET ORAL
Qty: 90 TABLET | Refills: 0 | Status: SHIPPED | OUTPATIENT
Start: 2021-11-10 | End: 2022-02-15 | Stop reason: SDUPTHER

## 2021-11-23 NOTE — PROGRESS NOTES
Visit Information    Have you changed or started any medications since your last visit including any over-the-counter medicines, vitamins, or herbal medicines? no   Have you stopped taking any of your medications? Is so, why? -  no  Are you having any side effects from any of your medications? - no    Have you seen any other physician or provider since your last visit?  no   Have you had any other diagnostic tests since your last visit?  no   Have you been seen in the emergency room and/or had an admission in a hospital since we last saw you?  no   Have you had your routine dental cleaning in the past 6 months?  no     Do you have an active MyChart account? If no, what is the barrier?   Yes    Patient Care Team:  Martine Mancia MD as PCP - General (Family Medicine)  Martine Mancia MD as PCP - [facility]  Lamont Doyle MD as Consulting Physician (Rheumatology)  Brandee Wahl MD as Surgeon (Cardiology)  Ankit Franks MD as Consulting Physician (Nephrology)  Jing Jewell MD as Consulting Physician (Neurology)  Ankit Franks MD as Consulting Physician (Nephrology)  Dara Leon MD as Consulting Physician (Urology)    Medical History Review  Past Medical, Family, and Social History reviewed and does contribute to the patient presenting condition    Health Maintenance   Topic Date Due    COVID-19 Vaccine (1) Never done    DTaP/Tdap/Td vaccine (1 - Tdap) Never done    Shingles Vaccine (1 of 2) Never done    Pneumococcal 65+ years Vaccine (1 of 1 - PPSV23) Never done    Colon Cancer Screen FIT/FOBT  03/29/2018    Annual Wellness Visit (AWV)  Never done    Lipid screen  07/27/2021    Flu vaccine (1) Never done    Diabetic retinal exam  10/19/2021    A1C test (Diabetic or Prediabetic)  06/29/2022    Potassium monitoring  06/29/2022    Creatinine monitoring  06/29/2022    Breast cancer screen  08/28/2022    Diabetic foot exam  09/22/2022    DEXA (modify frequency per FRAX score)  Completed    Hepatitis C screen  Completed    Hepatitis A vaccine  Aged Out    Hib vaccine  Aged Out    Meningococcal (ACWY) vaccine  Aged Out

## 2021-11-24 ENCOUNTER — OFFICE VISIT (OUTPATIENT)
Dept: FAMILY MEDICINE CLINIC | Age: 71
End: 2021-11-24
Payer: MEDICARE

## 2021-11-24 VITALS
TEMPERATURE: 97.4 F | DIASTOLIC BLOOD PRESSURE: 74 MMHG | OXYGEN SATURATION: 99 % | BODY MASS INDEX: 26.61 KG/M2 | HEIGHT: 59 IN | WEIGHT: 132 LBS | SYSTOLIC BLOOD PRESSURE: 132 MMHG | HEART RATE: 77 BPM

## 2021-11-24 DIAGNOSIS — I50.32 CHRONIC DIASTOLIC CHF (CONGESTIVE HEART FAILURE) (HCC): ICD-10-CM

## 2021-11-24 DIAGNOSIS — N18.30 BENIGN HYPERTENSION WITH CKD (CHRONIC KIDNEY DISEASE) STAGE III (HCC): ICD-10-CM

## 2021-11-24 DIAGNOSIS — N18.31 TYPE 2 DIABETES MELLITUS WITH STAGE 3A CHRONIC KIDNEY DISEASE, WITHOUT LONG-TERM CURRENT USE OF INSULIN (HCC): ICD-10-CM

## 2021-11-24 DIAGNOSIS — Z71.89 ACP (ADVANCE CARE PLANNING): ICD-10-CM

## 2021-11-24 DIAGNOSIS — E78.5 HYPERLIPIDEMIA WITH TARGET LDL LESS THAN 70: ICD-10-CM

## 2021-11-24 DIAGNOSIS — E11.22 TYPE 2 DIABETES MELLITUS WITH STAGE 3A CHRONIC KIDNEY DISEASE, WITHOUT LONG-TERM CURRENT USE OF INSULIN (HCC): ICD-10-CM

## 2021-11-24 DIAGNOSIS — I12.9 BENIGN HYPERTENSION WITH CKD (CHRONIC KIDNEY DISEASE) STAGE III (HCC): ICD-10-CM

## 2021-11-24 DIAGNOSIS — Z00.00 ROUTINE GENERAL MEDICAL EXAMINATION AT A HEALTH CARE FACILITY: Primary | ICD-10-CM

## 2021-11-24 DIAGNOSIS — H91.93 BILATERAL HEARING LOSS, UNSPECIFIED HEARING LOSS TYPE: ICD-10-CM

## 2021-11-24 DIAGNOSIS — E55.9 VITAMIN D DEFICIENCY: ICD-10-CM

## 2021-11-24 PROBLEM — R41.82 ALTERED MENTAL STATUS: Status: RESOLVED | Noted: 2021-05-26 | Resolved: 2021-11-24

## 2021-11-24 PROBLEM — B36.9 FUNGAL RASH OF TORSO: Status: RESOLVED | Noted: 2021-05-26 | Resolved: 2021-11-24

## 2021-11-24 PROBLEM — L03.115 CELLULITIS OF BOTH LOWER EXTREMITIES: Status: RESOLVED | Noted: 2021-05-26 | Resolved: 2021-11-24

## 2021-11-24 PROBLEM — R31.9 HEMATURIA: Status: RESOLVED | Noted: 2021-03-19 | Resolved: 2021-11-24

## 2021-11-24 PROBLEM — D32.9 MENINGIOMA (HCC): Status: RESOLVED | Noted: 2021-02-25 | Resolved: 2021-11-24

## 2021-11-24 PROBLEM — L03.116 CELLULITIS OF BOTH LOWER EXTREMITIES: Status: RESOLVED | Noted: 2021-05-26 | Resolved: 2021-11-24

## 2021-11-24 PROBLEM — R41.0 CONFUSION: Status: RESOLVED | Noted: 2021-05-26 | Resolved: 2021-11-24

## 2021-11-24 PROBLEM — I10 ESSENTIAL HYPERTENSION: Chronic | Status: RESOLVED | Noted: 2020-07-25 | Resolved: 2021-11-24

## 2021-11-24 LAB — HBA1C MFR BLD: 5.8 %

## 2021-11-24 PROCEDURE — 3044F HG A1C LEVEL LT 7.0%: CPT | Performed by: FAMILY MEDICINE

## 2021-11-24 PROCEDURE — G8484 FLU IMMUNIZE NO ADMIN: HCPCS | Performed by: FAMILY MEDICINE

## 2021-11-24 PROCEDURE — G0439 PPPS, SUBSEQ VISIT: HCPCS | Performed by: FAMILY MEDICINE

## 2021-11-24 PROCEDURE — 83036 HEMOGLOBIN GLYCOSYLATED A1C: CPT | Performed by: FAMILY MEDICINE

## 2021-11-24 PROCEDURE — 4040F PNEUMOC VAC/ADMIN/RCVD: CPT | Performed by: FAMILY MEDICINE

## 2021-11-24 PROCEDURE — 1123F ACP DISCUSS/DSCN MKR DOCD: CPT | Performed by: FAMILY MEDICINE

## 2021-11-24 PROCEDURE — 3017F COLORECTAL CA SCREEN DOC REV: CPT | Performed by: FAMILY MEDICINE

## 2021-11-24 ASSESSMENT — PATIENT HEALTH QUESTIONNAIRE - PHQ9
SUM OF ALL RESPONSES TO PHQ QUESTIONS 1-9: 0
SUM OF ALL RESPONSES TO PHQ QUESTIONS 1-9: 0
1. LITTLE INTEREST OR PLEASURE IN DOING THINGS: 0
SUM OF ALL RESPONSES TO PHQ QUESTIONS 1-9: 0
2. FEELING DOWN, DEPRESSED OR HOPELESS: 0
SUM OF ALL RESPONSES TO PHQ9 QUESTIONS 1 & 2: 0

## 2021-11-24 ASSESSMENT — VISUAL ACUITY
OS_CC: 00/00
OD_CC: 00/00

## 2021-11-24 ASSESSMENT — LIFESTYLE VARIABLES: HOW OFTEN DO YOU HAVE A DRINK CONTAINING ALCOHOL: 0

## 2021-11-24 NOTE — PATIENT INSTRUCTIONS
heart-healthy diet is one that limits sodium , certain types of fat , and cholesterol . This type of diet is recommended for:   People with any form of cardiovascular disease (eg, coronary heart disease , peripheral vascular disease , previous heart attack , previous stroke )   People with risk factors for cardiovascular disease, such as high blood pressure , high cholesterol , or diabetes   Anyone who wants to lower their risk of developing cardiovascular disease   Sodium    Sodium is a mineral found in many foods. In general, most people consume much more sodium than they need. Diets high in sodium can increase blood pressure and lead to edema (water retention). On a heart-healthy diet, you should consume no more than 2,300 mg (milligrams) of sodium per dayabout the amount in one teaspoon of table salt. The foods highest in sodium include table salt (about 50% sodium), processed foods, convenience foods, and preserved foods. Cholesterol    Cholesterol is a fat-like, waxy substance in your blood. Our bodies make some cholesterol. It is also found in animal products, with the highest amounts in fatty meat, egg yolks, whole milk, cheese, shellfish, and organ meats. On a heart-healthy diet, you should limit your cholesterol intake to less than 200 mg per day. It is normal and important to have some cholesterol in your bloodstream. But too much cholesterol can cause plaque to build up within your arteries, which can eventually lead to a heart attack or stroke. The two types of cholesterol that are most commonly referred to are:   Low-density lipoprotein (LDL) cholesterol  Also known as bad cholesterol, this is the cholesterol that tends to build up along your arteries. Bad cholesterol levels are increased by eating fats that are saturated or hydrogenated. Optimal level of this cholesterol is less than 100. Over 130 starts to get risky for heart disease.    High-density lipoprotein (HDL) cholesterol  Also known as good cholesterol, this type of cholesterol actually carries cholesterol away from your arteries and may, therefore, help lower your risk of having a heart attack. You want this level to be high (ideally greater than 60). It is a risk to have a level less than 40. You can raise this good cholesterol by eating olive oil, canola oil, avocados, or nuts. Exercise raises this level, too. Fat    Fat is calorie dense and packs a lot of calories into a small amount of food. Even though fats should be limited due to their high calorie content, not all fats are bad. In fact, some fats are quite healthful. Fat can be broken down into four main types. The good-for-you fats are:   Monounsaturated fat  found in oils such as olive and canola, avocados, and nuts and natural nut butters; can decrease cholesterol levels, while keeping levels of HDL cholesterol high   Polyunsaturated fat  found in oils such as safflower, sunflower, soybean, corn, and sesame; can decrease total cholesterol and LDL cholesterol   Omega-3 fatty acids  particularly those found in fatty fish (such as salmon, trout, tuna, mackerel, herring, and sardines); can decrease risk of arrhythmias, decrease triglyceride levels, and slightly lower blood pressure   The fats that you want to limit are:   Saturated fat  found in animal products, many fast foods, and a few vegetables; increases total blood cholesterol, including LDL levels   Animal fats that are saturated include: butter, lard, whole-milk dairy products, meat fat, and poultry skin   Vegetable fats that are saturated include: hydrogenated shortening, palm oil, coconut oil, cocoa butter   Hydrogenated or trans fat  found in margarine and vegetable shortening, most shelf stable snack foods, and fried foods; increases LDL and decreases HDL     It is generally recommended that you limit your total fat for the day to less than 30% of your total calories.  If you follow an 1800-calorie heart healthy diet, for example, this would mean 60 grams of fat or less per day. Saturated fat and trans fat in your diet raises your blood cholesterol the most, much more than dietary cholesterol does. For this reason, on a heart-healthy diet, less than 7% of your calories should come from saturated fat and ideally 0% from trans fat. On an 1800-calorie diet, this translates into less than 14 grams of saturated fat per day, leaving 46 grams of fat to come from mono- and polyunsaturated fats.    Food Choices on a Heart Healthy Diet   Food Category   Foods Recommended   Foods to Avoid   Grains   Breads and rolls without salted tops Most dry and cooked cereals Unsalted crackers and breadsticks Low-sodium or homemade breadcrumbs or stuffing All rice and pastas   Breads, rolls, and crackers with salted tops High-fat baked goods (eg, muffins, donuts, pastries) Quick breads, self-rising flour, and biscuit mixes Regular bread crumbs Instant hot cereals Commercially prepared rice, pasta, or stuffing mixes   Vegetables   Most fresh, frozen, and low-sodium canned vegetables Low-sodium and salt-free vegetable juices Canned vegetables if unsalted or rinsed   Regular canned vegetables and juices, including sauerkraut and pickled vegetables Frozen vegetables with sauces Commercially prepared potato and vegetable mixes   Fruits   Most fresh, frozen, and canned fruits All fruit juices   Fruits processed with salt or sodium   Milk   Nonfat or low-fat (1%) milk Nonfat or low-fat yogurt Cottage cheese, low-fat ricotta, cheeses labeled as low-fat and low-sodium   Whole milk Reduced-fat (2%) milk Malted and chocolate milk Full fat yogurt Most cheeses (unless low-fat and low salt) Buttermilk (no more than 1 cup per week)   Meats and Beans   Lean cuts of fresh or frozen beef, veal, lamb, or pork (look for the word loin) Fresh or frozen poultry without the skin Fresh or frozen fish and some shellfish Egg whites and egg substitutes (Limit whole eggs to three per and cholesterol amounts. For products low in sodium, look for sodium free, very low sodium, low sodium, no added salt, and unsalted   Skip the salt when cooking or at the table; if food needs more flavor, get creative and try out different herbs and spices. Garlic and onion also add substantial flavor to foods. Trim any visible fat off meat and poultry before cooking, and drain the fat off after voss. Use cooking methods that require little or no added fat, such as grilling, boiling, baking, poaching, broiling, roasting, steaming, stir-frying, and sauting. Avoid fast food and convenience food. They tend to be high in saturated and trans fat and have a lot of added salt. Talk to a registered dietitian for individualized diet advice. Last Reviewed: March 2011 Keturah Schwab MS, MPH, RD   Updated: 3/29/2011   ·     Heart-Healthy Diet   Sodium, Fat, and Cholesterol Controlled Diet       What Is a Heart Healthy Diet? A heart-healthy diet is one that limits sodium , certain types of fat , and cholesterol . This type of diet is recommended for:   People with any form of cardiovascular disease (eg, coronary heart disease , peripheral vascular disease , previous heart attack , previous stroke )   People with risk factors for cardiovascular disease, such as high blood pressure , high cholesterol , or diabetes   Anyone who wants to lower their risk of developing cardiovascular disease   Sodium    Sodium is a mineral found in many foods. In general, most people consume much more sodium than they need. Diets high in sodium can increase blood pressure and lead to edema (water retention). On a heart-healthy diet, you should consume no more than 2,300 mg (milligrams) of sodium per dayabout the amount in one teaspoon of table salt. The foods highest in sodium include table salt (about 50% sodium), processed foods, convenience foods, and preserved foods.    Cholesterol    Cholesterol is a fat-like, waxy substance in your blood. Our bodies make some cholesterol. It is also found in animal products, with the highest amounts in fatty meat, egg yolks, whole milk, cheese, shellfish, and organ meats. On a heart-healthy diet, you should limit your cholesterol intake to less than 200 mg per day. It is normal and important to have some cholesterol in your bloodstream. But too much cholesterol can cause plaque to build up within your arteries, which can eventually lead to a heart attack or stroke. The two types of cholesterol that are most commonly referred to are:   Low-density lipoprotein (LDL) cholesterol  Also known as bad cholesterol, this is the cholesterol that tends to build up along your arteries. Bad cholesterol levels are increased by eating fats that are saturated or hydrogenated. Optimal level of this cholesterol is less than 100. Over 130 starts to get risky for heart disease. High-density lipoprotein (HDL) cholesterol  Also known as good cholesterol, this type of cholesterol actually carries cholesterol away from your arteries and may, therefore, help lower your risk of having a heart attack. You want this level to be high (ideally greater than 60). It is a risk to have a level less than 40. You can raise this good cholesterol by eating olive oil, canola oil, avocados, or nuts. Exercise raises this level, too. Fat    Fat is calorie dense and packs a lot of calories into a small amount of food. Even though fats should be limited due to their high calorie content, not all fats are bad. In fact, some fats are quite healthful. Fat can be broken down into four main types.    The good-for-you fats are:   Monounsaturated fat  found in oils such as olive and canola, avocados, and nuts and natural nut butters; can decrease cholesterol levels, while keeping levels of HDL cholesterol high   Polyunsaturated fat  found in oils such as safflower, sunflower, soybean, corn, and sesame; can decrease total cholesterol and LDL cholesterol   Omega-3 fatty acids  particularly those found in fatty fish (such as salmon, trout, tuna, mackerel, herring, and sardines); can decrease risk of arrhythmias, decrease triglyceride levels, and slightly lower blood pressure   The fats that you want to limit are:   Saturated fat  found in animal products, many fast foods, and a few vegetables; increases total blood cholesterol, including LDL levels   Animal fats that are saturated include: butter, lard, whole-milk dairy products, meat fat, and poultry skin   Vegetable fats that are saturated include: hydrogenated shortening, palm oil, coconut oil, cocoa butter   Hydrogenated or trans fat  found in margarine and vegetable shortening, most shelf stable snack foods, and fried foods; increases LDL and decreases HDL     It is generally recommended that you limit your total fat for the day to less than 30% of your total calories. If you follow an 1800-calorie heart healthy diet, for example, this would mean 60 grams of fat or less per day. Saturated fat and trans fat in your diet raises your blood cholesterol the most, much more than dietary cholesterol does. For this reason, on a heart-healthy diet, less than 7% of your calories should come from saturated fat and ideally 0% from trans fat. On an 1800-calorie diet, this translates into less than 14 grams of saturated fat per day, leaving 46 grams of fat to come from mono- and polyunsaturated fats.    Food Choices on a Heart Healthy Diet   Food Category   Foods Recommended   Foods to Avoid   Grains   Breads and rolls without salted tops Most dry and cooked cereals Unsalted crackers and breadsticks Low-sodium or homemade breadcrumbs or stuffing All rice and pastas   Breads, rolls, and crackers with salted tops High-fat baked goods (eg, muffins, donuts, pastries) Quick breads, self-rising flour, and biscuit mixes Regular bread crumbs Instant hot cereals Commercially prepared rice, pasta, or stuffing mixes Vegetables   Most fresh, frozen, and low-sodium canned vegetables Low-sodium and salt-free vegetable juices Canned vegetables if unsalted or rinsed   Regular canned vegetables and juices, including sauerkraut and pickled vegetables Frozen vegetables with sauces Commercially prepared potato and vegetable mixes   Fruits   Most fresh, frozen, and canned fruits All fruit juices   Fruits processed with salt or sodium   Milk   Nonfat or low-fat (1%) milk Nonfat or low-fat yogurt Cottage cheese, low-fat ricotta, cheeses labeled as low-fat and low-sodium   Whole milk Reduced-fat (2%) milk Malted and chocolate milk Full fat yogurt Most cheeses (unless low-fat and low salt) Buttermilk (no more than 1 cup per week)   Meats and Beans   Lean cuts of fresh or frozen beef, veal, lamb, or pork (look for the word loin) Fresh or frozen poultry without the skin Fresh or frozen fish and some shellfish Egg whites and egg substitutes (Limit whole eggs to three per week) Tofu Nuts or seeds (unsalted, dry-roasted), low-sodium peanut butter Dried peas, beans, and lentils   Any smoked, cured, salted, or canned meat, fish, or poultry (including de le[place], chipped beef, cold cuts, hot dogs, sausages, sardines, and anchovies) Poultry skins Breaded and/or fried fish or meats Canned peas, beans, and lentils Salted nuts   Fats and Oils   Olive oil and canola oil Low-sodium, low-fat salad dressings and mayonnaise   Butter, margarine, coconut and palm oils, de león fat   Snacks, Sweets, and Condiments   Low-sodium or unsalted versions of broths, soups, soy sauce, and condiments Pepper, herbs, and spices; vinegar, lemon, or lime juice Low-fat frozen desserts (yogurt, sherbet, fruit bars) Sugar, cocoa powder, honey, syrup, jam, and preserves Low-fat, trans-fat free cookies, cakes, and pies Salvatore and animal crackers, fig bars, yuriy snaps   High-fat desserts Broth, soups, gravies, and sauces, made from instant mixes or other high-sodium ingredients easily. The older you are, the more likely you are to get osteoporosis. But with plenty of calcium, vitamin D, and exercise, you can help prevent osteoporosis. The preteen and teen years are a key time for bone building. With the help of calcium, vitamin D, and exercise in those early years and beyond, the bones reach their peak density and strength by age 27. After age 27, your bones naturally start to thin and weaken. The stronger your bones are at around age 27, the lower your risk for osteoporosis. But no matter what your age and risk are, your bones still need calcium, vitamin D, and exercise to stay strong. Also avoid smoking, and limit alcohol. Smoking and heavy alcohol use can make your bones thinner. Talk to your doctor about any special risks you might have, such as having a close relative with osteoporosis or taking a medicine that can weaken bones. Your doctor can tell you the best ways to protect your bones from thinning. Follow-up care is a key part of your treatment and safety. Be sure to make and go to all appointments, and call your doctor if you are having problems. It's also a good idea to know your test results and keep a list of the medicines you take. How can you care for yourself at home? Get enough calcium and vitamin D. The [place] recommends adults younger than age 46 need 1,000 mg of calcium and 600 IU of vitamin D each day. Women ages 46 to 79 need 1,200 mg of calcium and 600 IU of vitamin D each day. Men ages 46 to 79 need 1,000 mg of calcium and 600 IU of vitamin D each day. Adults 71 and older need 1,200 mg of calcium and 800 IU of vitamin D each day. Eat foods rich in calcium, like yogurt, cheese, milk, and dark green vegetables. Eat foods rich in vitamin D, like eggs, fatty fish, cereal, and fortified milk. Get some sunshine. Your body uses sunshine to make its own vitamin D. The safest time to be out in the sun is before 10 a.m. or after 3 p.m.  Avoid getting sunburned. Sunburn can increase your risk of skin cancer. Talk to your doctor about taking a calcium plus vitamin D supplement. Ask about what type of calcium is right for you, and how much to take at a time. Adults ages 23 to 48 should not get more than 2,500 mg of calcium and 4,000 IU of vitamin D each day, whether it is from supplements and/or food. Adults ages 46 and older should not get more than 2,000 mg of calcium and 4,000 IU of vitamin D each day from supplements and/or food. Get regular bone-building exercise. Weight-bearing and resistance exercises keep bones healthy by working the muscles and bones against gravity. Start out at an exercise level that feels right for you. Add a little at a time until you can do the following:  Do 30 minutes of weight-bearing exercise on most days of the week. Walking, jogging, stair climbing, and dancing are good choices. Do resistance exercises with weights or elastic bands 2 to 3 days a week. Limit alcohol. Drink no more than 1 alcohol drink a day if you are a woman. Drink no more than 2 alcohol drinks a day if you are a man. Do not smoke. Smoking can make bones thin faster. If you need help quitting, talk to your doctor about stop-smoking programs and medicines. These can increase your chances of quitting for good. When should you call for help? Watch closely for changes in your health, and be sure to contact your doctor if:  You need help with a healthy eating plan. You need help with an exercise plan    © 2081-8802 [employer]. Care instructions adapted under license by [facility]. This care instruction is for use with your licensed healthcare professional. If you have questions about a medical condition or this instruction, always ask your healthcare professional. [place] [address] any warranty or liability for your use of this information. Content Version: 9.4.61473;  Last Revised: June 20, 2011              ·     DASH Diet: After Your Visit  Your Care Instructions  The DASH diet is an eating plan that can help lower your blood pressure. DASH stands for Dietary Approaches to Stop Hypertension. Hypertension is high blood pressure. The DASH diet focuses on eating foods that are high in calcium, potassium, and magnesium. These nutrients can lower blood pressure. The foods that are highest in these nutrients are fruits, vegetables, low-fat dairy products, nuts, seeds, and legumes. But taking calcium, potassium, and magnesium supplements instead of eating foods that are high in those nutrients does not have the same effect. The DASH diet also includes whole grains, fish, and poultry. The DASH diet is one of several lifestyle changes your doctor may recommend to lower your high blood pressure. Your doctor may also want you to decrease the amount of sodium in your diet. Lowering sodium while following the DASH diet can lower blood pressure even further than just the DASH diet alone. Follow-up care is a key part of your treatment and safety. Be sure to make and go to all appointments, and call your doctor if you are having problems. Its also a good idea to know your test results and keep a list of the medicines you take. How can you care for yourself at home? Following the DASH diet  Eat 4 to 5 servings of fruit each day. A serving is 1 medium-sized piece of fruit, ½ cup chopped or canned fruit, 1/4 cup dried fruit, or 4 ounces (½ cup) of fruit juice. Choose fruit more often than fruit juice. Eat 4 to 5 servings of vegetables each day. A serving is 1 cup of lettuce or raw leafy vegetables, ½ cup of chopped or cooked vegetables, or 4 ounces (½ cup) of vegetable juice. Choose vegetables more often than vegetable juice. Get 2 to 3 servings of low-fat and fat-free dairy each day. A serving is 8 ounces of milk, 1 cup of yogurt, or 1 ½ ounces of cheese. Eat 7 to 8 servings of grains each day.  A serving is 1 slice of bread, 1 ounce of dry cereal, or ½ cup of cooked rice, pasta, or cooked cereal. Try to choose whole-grain products as much as possible. Limit lean meat, poultry, and fish to 6 ounces each day. Six ounces is about the size of two decks of cards. Eat 4 to 5 servings of nuts, seeds, and legumes (cooked dried beans, lentils, and split peas) each week. A serving is 1/3 cup of nuts, 2 tablespoons of seeds, or ½ cup cooked dried beans or peas. Limit sweets and added sugars to 5 servings or less a week. A serving is 1 tablespoon jelly or jam, ½ cup sorbet, or 1 cup of lemonade. Tips for success  Start small. Do not try to make dramatic changes to your diet all at once. You might feel that you are missing out on your favorite foods and then be more likely to not follow the plan. Make small changes, and stick with them. Once those changes become habit, add a few more changes. Try some of the following:  Make it a goal to eat a fruit or vegetable at every meal and at snacks. This will make it easy to get the recommended amount of fruits and vegetables each day. Try yogurt topped with fruit and nuts for a snack or healthy dessert. Add lettuce, tomato, cucumber, and onion to sandwiches. Combine a ready-made pizza crust with low-fat mozzarella cheese and lots of vegetable toppings. Try using tomatoes, squash, spinach, broccoli, carrots, cauliflower, and onions. Have a variety of cut-up vegetables with a low-fat dip as an appetizer instead of chips and dip. Sprinkle sunflower seeds or chopped almonds over salads. Or try adding chopped walnuts or almonds to cooked vegetables. Try some vegetarian meals using beans and peas. Add garbanzo or kidney beans to salads. Make burritos and tacos with mashed merchant beans or black beans    © 6087-7599 [employer]. Care instructions adapted under license by [facility].  This care instruction is for use with your licensed healthcare professional. If you have questions about a medical condition or this instruction, always ask your healthcare professional. [address] any warranty or liability for your use of this information. Content Version: 94.79270; Last Revised: March 15, 2012              ·     High-Fiber Diet     What Is Fiber? Dietary fiber is a form of carbohydrate found in plants that cannot be digested by humans. All plants contain fiber, including fruits, vegetables, grains, and legumes. Fiber is often classified into two categories: soluble and insoluble. Soluble fiber draws water into the bowel and can help slow digestion. Examples of foods that are high in soluble fiber include oatmeal, oat bran, barley, legumes (eg, beans and peas), apples, and strawberries. Insoluble fiber speeds digestion and can add bulk to the stool. Examples of foods that are high in insoluble fiber include whole-wheat products, wheat bran, cauliflower, green beans, and potatoes. Why Follow a High-Fiber Diet? A high-fiber diet is often recommended to prevent and treat constipation , hemorrhoids , diverticulitis , and irritable bowel syndrome . Eating a high-fiber diet can also help improve your cholesterol levels, lower your risk of coronary heart disease , reduce your risk of type 2 diabetes , and lower your weight. For people with type 1 or 2 diabetes, a high-fiber diet can also help stabilize blood sugar levels. How Much Fiber Should I Eat? A high-fiber diet should contain  20-35 grams  of fiber a day. This is actually the amount recommended for the general adult population; however, most Americans eat only 15 grams of fiber per day. Digestion of Fiber   Eating a higher fiber diet than usual can take some getting used to by your body's digestive system. To avoid the side effects of sudden increases in dietary fiber (eg, gas, cramping, bloating, and diarrhea), increase fiber gradually and be sure to drink plenty of fluids every day. Tips for Increasing Fiber Intake   Whenever possible, choose whole grains over refined grains (eg, brown rice instead of white rice, whole-wheat bread instead of white bread). Include a variety of grains in your diet, such as wheat, rye, barley, oats, quinoa, and bulgur. Eat more vegetarian-based meals. Here are some ideas: black bean burgers, eggplant lasagna, and veggie tofu stir-walker. Choose high-fiber snacks, such as fruits, popcorn, whole-grain crackers, and nuts. Make whole-grain cereal or whole-grain toast part of your daily breakfast regime. When eating out, whether ordering a sandwich or dinner, ask for extra vegetables. When baking, replace part of the white flour with whole-wheat flour. Whole-wheat flour is particularly easy to incorporate into a recipe. High-Fiber Diet Eating Guide   Food Category   Foods Recommended   Notes   Grains   Whole-grain breads, muffins, bagels, or kimberley bread Rye bread Whole-wheat crackers or crisp breads Whole-grain or bran cereals Oatmeal, oat bran, or grits Wheat germ Whole-wheat pasta and brown rice   Read the ingredients list on food labels. Look for products that list \"whole\" as the first ingredient (eg, whole-wheat, whole oats). Choose cereals with at least 2 grams of fiber per serving. Vegetables   All vegetables, especially asparagus, bean sprouts, broccoli, [place] sprouts, cabbage, carrots, cauliflower, celery, corn, greens, green beans, green pepper, onions, peas, potatoes (with skin), snow peas, spinach, squash, sweet potatoes, tomatoes, zucchini   For maximum fiber intake, eat the peels of fruits and vegetablesjust be sure to wash them well first.   Fruits   All fruits, especially apples, berries, grapefruits, mangoes, nectarines, oranges, peaches, pears, dried fruits (figs, dates, prunes, raisins)   Choose raw fruits and vegetables over juice, cooked, or cannedraw fruit has more fiber. Dried fruit is also a good source of fiber.    Milk With the exception of yogurt containing inulin (a type of fiber), dairy foods provide little fiber. Add more fiber by topping your yogurt or cottage cheese with fresh fruit, whole grain or bran cereals, nuts, or seeds. Meats and Beans   All beans and peas, especially Garbanzo beans, kidney beans, lentils, lima beans, split peas, and merchant beans All nuts and seeds, especially almonds, peanuts, [place] nuts, cashews, peanut butter, walnuts, sesame and sunflower seeds All meat, poultry, fish, and eggs   Increase fiber in meat dishes by adding merchant beans, kidney beans, black-eyed peas, bran, or oatmeal. If you are following a low-fat diet, use nuts and seeds only in moderation. Fats and Oils   All in moderation   Fats and oils do not provide fiber   Snacks, Sweets, and Condiments   Fruit Nuts Popcorn, whole-wheat pretzels, or trail mix made with dried fruits, nuts, and seeds Cakes, breads, and cookies made with oatmeal or whole-wheat flour   Most snack foods do not provide much fiber. Choose snacks with at least 2 grams of fiber per serving. Last Reviewed: March 2011 Tien Rutherford MS, MPH, RD   Updated: 3/29/2011   ·     Keep Your Memory Thelma Lull       Many factors can affect your ability to remembera hectic lifestyle, aging, stress, chronic disease, and certain medicines. But, there are steps you can take to sharpen your mind and help preserve your memory. Challenge Your Brain   Regularly challenging your mind may help keeps it in top shape. Good mental exercises include:   Crossword puzzlesUse a dictionary if you need it; you will learn more that way. Brainteasers Try some! Crafts, such as wood working and sewing   Hobbies, such as gardening and building model airplanes   SocializingVisit old friends or join groups to meet new ones.    Reading   Learning a new language   Taking a class, whether it be art history or yvette chi   TravelingExperience the food, history, and culture of your destination   Learning to use a computer   Going to museums, the theater, or thought-provoking movies   Changing things in your daily life, such as reversing your pattern in the grocery store or brushing your teeth using your nondominant hand   Use Memory Aids   There is no need to remember every detail on your own. These memory aids can help:   Calendars and day planners   Electronic organizers to store all sorts of helpful informationThese devices can \"beep\" to remind you of appointments. A book of days to record birthdays, anniversaries, and other occasions that occur on the same date every year   Detailed \"to-do\" lists and strategically placed sticky notes   Quick \"study\" sessionsBefore a gathering, review who will be there so their names will be fresh in your mind. Establish routinesFor example, keep your keys, wallet, and umbrella in the same place all the time or take medicine with your 8:00 AM glass of juice   Live a Healthy Life   Many actions that will keep your body strong will do the same for your mind. For example:   Talk to Your Doctor About Herbs and Supplements    Malnutrition and vitamin deficiencies can impair your mental function. For example, vitamin B12 deficiency can cause a range of symptoms, including confusion. But, what if your nutritional needs are being met? Can herbs and supplements still offer a benefit? Researchers have investigated a range of natural remedies, such as ginkgo , ginseng , and the supplement phosphatidylserine (PS). So far, though, the evidence is inconsistent as to whether these products can improve memory or thinking. If you are interested in taking herbs and supplements, talk to your doctor first because they may interact with other medicines that you are taking. Exercise Regularly    Among the many benefits of regular exercise are increased blood flow to the brain and decreased risk of certain diseases that can interfere with memory function.  One study found that even moderate exercise has a beneficial effect. Examples of \"moderate\" exercise include:   Playing 18 holes of golf once a week, without a cart   Playing tennis twice a week   Walking one mile per day   Manage Stress    It can be tough to remember what is important when your mind is cluttered. Make time for relaxation. Choose activities that calm you down, and make it routine. Manage Chronic Conditions    Side effects of high blood pressure , diabetes, and heart disease can interfere with mental function. Many of the lifestyle steps discussed here can help manage these conditions. Strive to eat a healthy diet, exercise regularly, get stress under control, and follow your doctor's advice for your condition. Minimize Medications    Talk to your doctor about the medicines that you take. Some may be unnecessary. Also, healthy lifestyle habits may lower the need for certain drugs. Last Reviewed: April 2010 Chayito Kearney MD   Updated: 4/13/2010   ·     [address]       As we get older, changes in balance, gait, strength, vision, hearing, and cognition make even the most youthful senior more prone to accidents. Falls are one of the leading health risks for older people. This increased risk of falling is related to:   Aging process (eg, decreased muscle strength, slowed reflexes)   Higher incidence of chronic health problems (eg, arthritis, diabetes) that may limit mobility, agility or sensory awareness   Side effects of medicine (eg, dizziness, blurred vision)especially medicines like prescription pain medicines and drugs used to treat mental health conditions   Depending on the brittleness of your bones, the consequences of a fall can be serious and long lasting. Home Life   Research by the Association of Aging [facility]) shows that some home accidents among older adults can be prevented by making simple lifestyle changes and basic modifications and repairs to the home environment.  Here are some lifestyle changes that experts recommend:   Have your hearing and vision checked regularly. Be sure to wear prescription glasses that are right for you. Speak to your doctor or pharmacist about the possible side effects of your medicines. A number of medicines can cause dizziness. If you have problems with sleep, talk to your doctor. Limit your intake of alcohol. If necessary, use a cane or walker to help maintain your balance. Wear supportive, rubber-soled shoes, even at home. If you live in a region that gets wintry weather, you may want to put special cleats on your shoes to prevent you from slipping on the snow and ice. Exercise regularly to help maintain muscle tone, agility, and balance. Always hold the banister when going up or down stairs. Also, use  bars when getting in or out of the bath or shower, or using the toilet. To avoid dizziness, get up slowly from a lying down position. Sit up first, dangling your legs for a minute or two before rising to a standing position. Overall Home Safety Check   According to the Consumer Product Safety Commision's \"Older Consumer Home Safety Checklist,\" it is important to check for potential hazards in each room. And remember, proper lighting is an essential factor in home safety. If you cannot see clearly, you are more likely to fall. Important questions to ask yourself include:   Are lamp, electric, extension, and telephone cords placed out of the flow of traffic and maintained in good condition? Have frayed cords been replaced? Are all small rugs and runners slip resistant? If not, you can secure them to the floor with a special double-sided carpet tape. Are smoke detectors properly locatedone on every floor of your home and one outside of every sleeping area? Are they in good working order? Are batteries replaced at least once a year? Do you have a well-maintained carbon monoxide detector outside every sleeping are in your home?    Does your furniture layout leave plenty of space to maneuver between and around chairs, tables, beds, and sofas? Are hallways, stairs and passages between rooms well lit? Can you reach a lamp without getting out of bed? Are floor surfaces well maintained? Shag rugs, high-pile carpeting, tile floors, and polished wood floors can be particularly slippery. Stairs should always have handrails and be carpeted or fitted with a non-skid tread. Is your telephone easily reachable. Is the cord safely tucked away? Room by Room   According to the Association of Aging, bathrooms and donnie are the two most potentially hazardous rooms in your home. In the Kitchen    Be sure your stove is in proper working order and always make sure burners and the oven are off before you go out or go to sleep. Keep pots on the back burners, turn handles away from the front of the stove, and keep stove clean and free of grease build-up. Kitchen ventilation systems and range exhausts should be working properly. Keep flammable objects such as towels and pot holders away from the cooking area except when in use. Make sure kitchen curtains are tied back. Move cords and appliances away from the sink and hot surfaces. If extension cords are needed, install wiring guides so they do not hang over the sink, range, or working areas. Look for coffee pots, kettles and toaster ovens with automatic shut-offs. Keep a mop handy in the kitchen so you can wipe up spills instantly. You should also have a small fire extinguisher. Arrange your kitchen with frequently used items on lower shelves to avoid the need to stand on a stepstool to reach them. Make sure countertops are well-lit to avoid injuries while cutting and preparing food. In the Bathroom    Use a non-slip mat or decals in the tub and shower, since wet, soapy tile or porcelain surfaces are extremely slippery. Make sure bathroom rugs are non-skid or tape them firmly to the floor. Bathtubs should have at least one, preferably two, grab bars, firmly attached to structural supports in the wall. (Do not use built-in soap holders or glass shower doors as grab bars.)    Tub seats fitted with non-slip material on the legs allow you to wash sitting down. For people with limited mobility, bathtub transfer benches allow you to slide safely into the tub. Raised toilet seats and toilet safety rails are helpful for those with knee or hip problems. In the [facility]    Make sure you use a nightlight and that the area around your bed is clear of potential obstacles. Be careful with electric blankets and never go to sleep with a heating pad, which can cause serious burns even if on a low setting. Use fire-resistant mattress covers and pillows, and NEVER smoke in bed. Keep a phone next to the bed that is programmed to dial 911 at the push of a button. If you have a chronic condition, you may want to sign on with an automatic call-in service. Typically the system includes a small pendant that connects directly to an emergency medical voice-response system. You should also make arrangements to stay in contact with someonefriend, neighbor, family memberon a regular schedule. Fire Prevention   According to the [employer]. (Smoke Alarms for Every) [address] [place] [address], senior citizens are one of the two highest risk groups for death and serious injuries due to residential fires. When cooking, wear short-sleeved items, never a bulky long-sleeved robe. The [facility]'s Safety Checklist for Older Consumers emphasizes the importance of checking basements, garages, workshops and storage areas for fire hazards, such as volatile liquids, piles of old rags or clothing and overloaded circuits. Never smoke in bed or when lying down on a couch or recliner chair. Small portable electric or kerosene heaters are responsible for many home fires and should be used cautiously if at all.  If you do use one, be sure to keep them away from flammable materials. In case of fire, make sure you have a pre-established emergency exit plan. Have a professional check your fireplace and other fuel-burning appliances yearly. Helping Hands   Baby boomers entering the priest years will continue to see the development of new products to help older adults live safely and independently in spite of age-related changes. Making Life More Livable  , by Taylor Myers, lists over 1,000 products for \"living well in the mature years,\" such as bathing and mobility aids, household security devices, ergonomically designed knives and peelers, and faucet valves and knobs for temperature control. Medical supply stores and organizations are good sources of information about products that improve your quality of life and insure your safety.      Last Reviewed: November 2009 Nargis Hamlin MD   Updated: 3/7/2011     ·

## 2021-11-24 NOTE — PROGRESS NOTES
Medicare Annual Wellness Visit  Name: Chris Danielson Date: 2021   MRN: [id number] Sex: Female   Age: 66 y.o. Ethnicity: Non- / Non    : 1950 Race: White (non-)      Herbert Zuniga is here for Medicare AWV and Immunizations (DARIAN COVID-19 VACCINE/NO TO FLU/ NO TO PPSV23)    Screenings for behavioral, psychosocial and functional/safety risks, and cognitive dysfunction are all negative except as indicated below. These results, as well as other patient data from the [address] form, are documented in Flowsheets linked to this Encounter. Allergies   Allergen Reactions    Latex Rash    Aleve [Naproxen Sodium]      Chronic kidney disease stage III, CHF    Pioglitazone Other (See Comments)     Congestive heart failure    Claritin [Loratadine]     Keflex [Cephalexin]     Lisinopril      Needs clarification of contraindication       Prior to Visit Medications    Medication Sig Taking?  Authorizing Provider   magnesium oxide (MAG-OX) 400 (241.3 Mg) MG TABS tablet  Yes Historical Provider, MD   desloratadine (CLARINEX) 5 MG tablet Take 1 tablet by mouth once daily Yes Nate Kumari MD   amLODIPine (NORVASC) 5 MG tablet Take 1 tablet by mouth daily Yes Nate Kumari MD   vitamin D (ERGOCALCIFEROL) 1.25 MG (34835 UT) CAPS capsule Take 1 capsule by mouth once a week Yes Norberto Hummel MD   clopidogrel (PLAVIX) 75 MG tablet Take 1 tablet by mouth daily Yes Nate Kumari MD   losartan (COZAAR) 25 MG tablet Take 1 tablet by mouth daily Yes Nate Kumari MD   colchicine (COLCRYS) 0.6 MG tablet Take 0.6 mg by mouth daily Yes Historical Provider, MD   diclofenac sodium (VOLTAREN) 1 % GEL Apply 4 g topically 3 times daily Yes Historical Provider, MD   furosemide (LASIX) 20 MG tablet Take 1 tablet by mouth daily Yes Nikita Blanc MD   metoprolol tartrate (LOPRESSOR) 50 MG tablet Take 1 tablet by mouth 2 times daily Yes Kayleigh MD Janae   miconazole (MICOTIN) 2 % powder Apply topically 2 times daily. Yes Teresa Rodriguez MD   magnesium oxide (MAG-OX) 400 MG tablet Take 1 tablet by mouth 2 times daily Yes Dorothy Maciel MD   sodium bicarbonate 650 MG tablet Take 1 tablet by mouth 3 times daily Yes Dorothy Maciel MD   blood glucose test strips (FREESTYLE LITE) strip 1 each by In Vitro route daily As needed.  Yes Reji Almanza MD   FreeStyle Lancets MISC 1 each by Does not apply route daily Patient needs to contact office before any further refills will be approved Yes Reji Almanza MD   allopurinol (ZYLOPRIM) 300 MG tablet Take 1 tablet by mouth daily Per rheumatologist Yes Reji Almanza MD   spironolactone (ALDACTONE) 25 MG tablet Take 1 tablet by mouth every morning (before breakfast) Water pill Yes Reji Almanza MD   atorvastatin (LIPITOR) 40 MG tablet Take 1 tablet by mouth daily Yes Reji Almanza MD   albuterol sulfate HFA (PROAIR HFA) 108 (90 Base) MCG/ACT inhaler Inhale 2 puffs into the lungs every 6 hours as needed for Wheezing or Shortness of Breath (cough) Yes Reji Almanza MD   apixaban (ELIQUIS) 5 MG TABS tablet Take 1 tablet by mouth 2 times daily Yes MAL Chung - CNP   Multiple Vitamins-Minerals (THERAPEUTIC MULTIVITAMIN-MINERALS) tablet Take 1 tablet by mouth daily Yes Historical Provider, MD   Blood Pressure KIT 1 kit by Does not apply route three times daily Yes Jonathon Musa MD   blood glucose monitor kit and supplies 1 kit by Other route three times daily Simran Monreal CBG Device Yes Miguel Busch MD         Past Medical History:   Diagnosis Date    Acute CVA (cerebrovascular accident) ([facility] [address] 7/25/2020    Altered mental status 5/26/2021    Arthritis     Brain mass     Cellulitis and abscess     Cellulitis and abscess of unspecified site     Cellulitis of both lower extremities 5/26/2021    Cellulitis of left lower extremity 7/26/2020  Cellulitis of lower extremity 10/4/2020    CHF (congestive heart failure) (HCC)     Chronic kidney disease, unspecified     Coronary atherosclerosis of native coronary artery     Essential hypertension 7/25/2020    Essential hypertension, malignant     Hallucinations 2/18/2021    Hard of hearing     Head contusion 9/9/2020    Hypokalemia 9/9/2020    Iron deficiency anemia, unspecified     Meningioma ([facility] [address] 2/25/2021    Myocardial infarction ([facility] [address]     Other and unspecified hyperlipidemia     Pure hypercholesterolemia     Toxic metabolic encephalopathy 3/74/6135    Type II or unspecified type diabetes mellitus without mention of complication, not stated as uncontrolled     Unspecified asthma(493.90)     Unspecified venous (peripheral) insufficiency     Unspecified vitamin D deficiency     UTI (urinary tract infection) 2/18/2021       Past Surgical History:   Procedure Laterality Date    CORONARY ARTERY BYPASS GRAFT      x 3, Dr. Duran Contreras           Family History   Problem Relation Age of Onset    Diabetes Mother     Heart Disease Mother     Heart Disease Father     Diabetes Sister     High Blood Pressure Sister     Diabetes Maternal Grandmother        CareTeam (Including outside providers/suppliers regularly involved in providing care):   Patient Care Team:  Brielle Sargent MD as PCP - General (Family Medicine)  Brielle Sargent MD as PCP - [facility]  Aaron Babb MD as Consulting Physician (Rheumatology)  Jcarlos Rooney MD as Surgeon (Cardiology)  Tay Cr MD as Consulting Physician (Nephrology)  Nico Gramajo MD as Consulting Physician (Neurology)  Tay Cr MD as Consulting Physician (Nephrology)  Franki Amaya MD as Consulting Physician (Urology)  Moises Turcios MD as Surgeon (Ophthalmology)    Vital signs within normal limits except mildly overweight per BMI  Wt Readings from Last 3 Encounters:   11/24/21 132 lb (59.9 kg)   09/22/21 132 lb 6.4 oz (60.1 kg)   09/14/21 134 lb (60.8 kg)     Vitals:    11/24/21 1615   BP: 132/74   Pulse: 77   Temp: 97.4 °F (36.3 °C)   SpO2: 99%   Weight: 132 lb (59.9 kg)   Height: 4' 11\" (1.499 m)     Body mass index is 26.66 kg/m². Comes with her daughter,Moise. Based upon direct observation of the patient, evaluation of cognition reveals recent and remote memory intact. 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 The daughter says patient has been climbing up on the stool steps to clean up the house. I explained to the patient that he is not allowed to climb up on anything because she cannot fall and then end up not only in the hospital, but in a nursing home. She promises me she will not do this anymore. Physical Exam    General Appearance: alert and oriented to person, place and time, well-developed and well-nourished, in no acute distress  ENT: hearing abnormal-decreased bilaterally  Pulmonary/Chest: clear to auscultation bilaterally- no wheezes, rales or rhonchi, normal air movement, no respiratory distress  Cardiovascular: normal rate, regular rhythm, normal S1 and S2, no gallops and no JVD  Abdomen: soft, non-tender, non-distended, normal bowel sounds, no masses or organomegaly  Extremities: 1 + edema-  bilateral legs  Easy bruising bilateral arms. On the right dorsum of the forearm, crusted old excoriation about 1 cm, no signs of infection  Ambulates with walker    Patient's complete Health Risk Assessment and screening values have been reviewed and are found in Flowsheets. The following problems were reviewed today and where indicated follow up appointments were made and/or referrals ordered.     Positive Risk Factor Screenings with Interventions:            General Health and ACP:  General  In general, how would you say your health is?: Good  In the past 7 days, have you experienced any of the following? New or Increased Pain, New or Increased Fatigue, Loneliness, Social Isolation, Stress or Anger?: (!) New or Increased Fatigue, Stress  Do you get the social and emotional support that you need?: Yes  Do you have a Living Will?: (!) No  Advance Directives     Power of  Living Will ACP-Advance Directive ACP-Power of     Not on File Not on File Not on File Not on File      General Health Risk Interventions:  · Fatigue: regular exercise recommended-to walk more, to use her walker, labs ordered  · Rashard Mendoza says she is hearing things that are not there she is afraid she has another UTI. I also advised them to make appointment with the ENT  ·   · Stress -patient says she is stressed out about upcoming holidays, wants things a specific way, relaxation techniques discussed, she does have help from her daughter, I strongly advised her to accept some things because not everything can be perfect, patient smiled and she says she will do that.     · No Living Will: Advance Care Planning  Referral placed    Health Habits/Nutrition:  Health Habits/Nutrition  Do you exercise for at least 20 minutes 2-3 times per week?: (!) No  Have you lost any weight without trying in the past 3 months?: No  Do you eat only one meal per day?: No  Have you seen the dentist within the past year?: (!) No  Body mass index: (!) 26.66  Health Habits/Nutrition Interventions:  · Inadequate physical activity:  patient agrees to increase activity level in the house, to do sitting exercises and to walk with walker to prevent falls, to avoid stepping on stools and climbing  · Nutritional issues:  educational materials for healthy, well-balanced diet provided, low-carb diet advised  · Dental exam overdue:  patient declines dental evaluation, her daughter says she will not go to the dentist due to risk of COVID-19 and wearing mask and not being vaccinated, she says she has not seen the dentist in many years    Hearing/Vision:   Hearing Screening    125Hz 250Hz 500Hz 1000Hz 2000Hz 3000Hz 4000Hz 6000Hz 8000Hz   Right ear:            Left ear:               Visual Acuity Screening    Right eye Left eye Both eyes   Without correction:      With correction: 00/00 00/00 00/00   Comments: UNABLE TO SEE ANYTHING ON CHART WILL BE HAVING EYE SX    Hearing/Vision  Do you or your family notice any trouble with your hearing that hasn't been managed with hearing aids?: (!) Yes  Do you have difficulty driving, watching TV, or doing any of your daily activities because of your eyesight?: (!) Yes  Have you had an eye exam within the past year?: Yes  Hearing/Vision Interventions:  · Hearing concerns:  patient declines any further evaluation/treatment for hearing issues, had hearing test last year, and needs hearing aids, but couldn't afford them at that time, advised to return back to ENT and to see again what would be appropriate, could even consider trying [employer] for hearing aids, her daughter promises me she will look into this  ·   · Vision concerns:  patient encouraged to make appointment with his/her eye specialist ,new referral placed     ADL:  ADLs  In the past 7 days, did you need help from others to perform any of the following everyday activities? Eating, dressing, grooming, bathing, toileting, or walking/balance?: None  In the past 7 days, did you need help from others to take care of any of the following?  Laundry, housekeeping, banking/finances, shopping, telephone use, food preparation, transportation, or taking medications?: (!) Transportation  ADL Interventions:  · Patient declines any further evaluation/treatment for this issue  · Rashard Mendoza drives her, patient does not drive    Personalized Preventive Plan   Current Health Maintenance Status----strongly advised to get the COVID-19 vaccine, we discussed options I advised her for [employer] vaccine which is fully FDA approved, and to receive it before Evaristo    There is no immunization history on file for this patient. Health Maintenance   Topic Date Due    COVID-19 Vaccine (1) Never done    DTaP/Tdap/Td vaccine (1 - Tdap) Never done    Shingles Vaccine (1 of 2) Never done    Pneumococcal 65+ years Vaccine (1 of 1 - PPSV23) Never done    Colon Cancer Screen FIT/FOBT  03/29/2018    Annual Wellness Visit (AWV)  Never done    Lipid screen  07/27/2021    Diabetic retinal exam  10/19/2021    Flu vaccine (1) 06/30/2022 (Originally 9/1/2021)    Potassium monitoring  06/29/2022    Creatinine monitoring  06/29/2022    Breast cancer screen  08/28/2022    Diabetic foot exam  09/22/2022    A1C test (Diabetic or Prediabetic)  11/24/2022    DEXA (modify frequency per FRAX score)  Completed    Hepatitis C screen  Completed    Hepatitis A vaccine  Aged Out    Hib vaccine  Aged Out    Meningococcal (ACWY) vaccine  Aged Out     Recommendations for [facility] Due: see orders and patient instructions/AVS.  . Recommended screening schedule for the next 5-10 years is provided to the patient in written form: see Patient Instructions/AVS.    Austin Mansfield was seen today for medicare awv and immunizations. Diagnoses and all orders for this visit:    Routine general medical examination at a health care facility    ACP (advance care planning)  -     [facility] Referral to [facility] Clinical Specialist    Type 2 diabetes mellitus with stage 3a chronic kidney disease, without long-term current use of insulin ([facility] [address]  Stable diabetes  Low-carb diet advised    Lab Results   Component Value Date    LABA1C 5.8 11/24/2021    LABA1C 5.7 06/29/2021    LABA1C 5.9 03/12/2021   We will update her labs, continue diet control only    -     POCT glycosylated hemoglobin (Hb A1C)  -     [facility] - Margo Contreras MD, Ophthalmology, [place]  -     Comprehensive Metabolic Panel; Future  -     Magnesium;  Future  -     Phosphorus; Future  -     TSH without Reflex; Future  -     Vitamin B12 & Folate; Future  -     CBC; Future    Chronic diastolic CHF (congestive heart failure) (HCC)  Stable  Lab Results   Component Value Date    LVEF 48 07/30/2020   To continue losartan 25 mg, furosemide 20 mg daily, Aldactone 25 mg daily, Lipitor 40 mg, metoprolol 50 mg twice a day      -     Brain Natriuretic Peptide; Future    HTN. Benign hypertension with CKD (chronic kidney disease) stage III (HCC)    Stable chronic kidney disease stage III, GFR 53 on 6/29/2021, was 46 on 5/27/2021  Well controlled HTN. Continue current treatment. Will recheck labs. -     Urinalysis Reflex to Culture; Future  -     Comprehensive Metabolic Panel; Future  -     Magnesium; Future  -     Phosphorus; Future  -     TSH without Reflex; Future  -     Vitamin B12 & Folate; Future  -     CBC; Future    Hyperlipidemia with target LDL less than 70  Well-controlled  To continue Lipitor 40 mg daily  Lab Results   Component Value Date    LDLCHOLESTEROL 47 07/27/2020       -     Lipid, Fasting; Future    Vitamin D deficiency  Unsure if improving or not. Will recheck level  Continue supplementation.    -     Vitamin D 25 Hydroxy; Future  Lab Results   Component Value Date    VITD25 19.0 (L) 03/18/2021         Bilateral hearing loss, unspecified hearing loss type  Likely worsening, patient did have the testing, she needs a hearing aids her daughter will help her look into it, could be the cause of the voices she hears  -     [facility] [facility], [facility], [place]    Return in 1 year (on 11/24/2022) for [place] [facility] in 1 year, [facility] [address] [facility].     another appt 4 mo, PHq-9 , 30 mins diabetes , CHF      Future Appointments   Date Time Provider Angela Sahaisti   5/4/2022  3:30 PM Kris Lu MD fp [id number]   11/25/2022  3:30 PM MD mary Sepulveda

## 2021-11-24 NOTE — RESULT ENCOUNTER NOTE
Addressed during office visit today, A1c 5.8, slightly worsening, very well controlled diabetes,, continue treatment recommended during the office visit.

## 2021-12-06 ENCOUNTER — HOSPITAL ENCOUNTER (OUTPATIENT)
Age: 71
Setting detail: SPECIMEN
Discharge: HOME | End: 2021-12-06

## 2021-12-06 DIAGNOSIS — E78.5 HYPERLIPIDEMIA WITH TARGET LDL LESS THAN 70: ICD-10-CM

## 2021-12-06 DIAGNOSIS — E11.22 TYPE 2 DIABETES MELLITUS WITH STAGE 3A CHRONIC KIDNEY DISEASE, WITHOUT LONG-TERM CURRENT USE OF INSULIN (HCC): ICD-10-CM

## 2021-12-06 DIAGNOSIS — I50.32 CHRONIC DIASTOLIC CHF (CONGESTIVE HEART FAILURE) (HCC): ICD-10-CM

## 2021-12-06 DIAGNOSIS — I12.9 BENIGN HYPERTENSION WITH CKD (CHRONIC KIDNEY DISEASE) STAGE III (HCC): ICD-10-CM

## 2021-12-06 DIAGNOSIS — N18.30 BENIGN HYPERTENSION WITH CKD (CHRONIC KIDNEY DISEASE) STAGE III (HCC): ICD-10-CM

## 2021-12-06 DIAGNOSIS — N18.31 TYPE 2 DIABETES MELLITUS WITH STAGE 3A CHRONIC KIDNEY DISEASE, WITHOUT LONG-TERM CURRENT USE OF INSULIN (HCC): ICD-10-CM

## 2021-12-06 DIAGNOSIS — E83.42 HYPOMAGNESEMIA: ICD-10-CM

## 2021-12-06 DIAGNOSIS — E87.6 HYPOKALEMIA: ICD-10-CM

## 2021-12-06 LAB
-: NORMAL
ALBUMIN SERPL-MCNC: 3.8 G/DL (ref 3.5–5.2)
ALBUMIN/GLOBULIN RATIO: 1.3 (ref 1–2.5)
ALP BLD-CCNC: 167 U/L (ref 35–104)
ALT SERPL-CCNC: 17 U/L (ref 5–33)
AMORPHOUS: NORMAL
ANION GAP SERPL CALCULATED.3IONS-SCNC: 13 MMOL/L (ref 9–17)
AST SERPL-CCNC: 29 U/L
BACTERIA: NORMAL
BILIRUB SERPL-MCNC: 0.24 MG/DL (ref 0.3–1.2)
BILIRUBIN URINE: NEGATIVE
BNP INTERPRETATION: ABNORMAL
BUN BLDV-MCNC: 64 MG/DL (ref 8–23)
BUN/CREAT BLD: ABNORMAL (ref 9–20)
CALCIUM SERPL-MCNC: 9.4 MG/DL (ref 8.6–10.4)
CASTS UA: NORMAL /LPF (ref 0–8)
CHLORIDE BLD-SCNC: 103 MMOL/L (ref 98–107)
CHOLESTEROL/HDL RATIO: 2.3
CHOLESTEROL: 135 MG/DL
CO2: 23 MMOL/L (ref 20–31)
COLOR: YELLOW
COMMENT UA: ABNORMAL
CREAT SERPL-MCNC: 1.48 MG/DL (ref 0.5–0.9)
CRYSTALS, UA: NORMAL /HPF
EPITHELIAL CELLS UA: NORMAL /HPF (ref 0–5)
GFR AFRICAN AMERICAN: 42 ML/MIN
GFR NON-AFRICAN AMERICAN: 35 ML/MIN
GFR SERPL CREATININE-BSD FRML MDRD: ABNORMAL ML/MIN/{1.73_M2}
GFR SERPL CREATININE-BSD FRML MDRD: ABNORMAL ML/MIN/{1.73_M2}
GLUCOSE BLD-MCNC: 116 MG/DL (ref 70–99)
GLUCOSE URINE: NEGATIVE
HCT VFR BLD CALC: 37.5 % (ref 36.3–47.1)
HDLC SERPL-MCNC: 59 MG/DL
HEMOGLOBIN: 11 G/DL (ref 11.9–15.1)
KETONES, URINE: NEGATIVE
LDL CHOLESTEROL: 50 MG/DL (ref 0–130)
LEUKOCYTE ESTERASE, URINE: NEGATIVE
MAGNESIUM: 1.9 MG/DL (ref 1.6–2.6)
MCH RBC QN AUTO: 29.3 PG (ref 25.2–33.5)
MCHC RBC AUTO-ENTMCNC: 29.3 G/DL (ref 28.4–34.8)
MCV RBC AUTO: 99.7 FL (ref 82.6–102.9)
MUCUS: NORMAL
NITRITE, URINE: NEGATIVE
NRBC AUTOMATED: 0 PER 100 WBC
OTHER OBSERVATIONS UA: NORMAL
PDW BLD-RTO: 16.4 % (ref 11.8–14.4)
PH UA: 6 (ref 5–8)
PHOSPHORUS: 4 MG/DL (ref 2.6–4.5)
PLATELET # BLD: 278 K/UL (ref 138–453)
PMV BLD AUTO: 11.1 FL (ref 8.1–13.5)
POTASSIUM SERPL-SCNC: 4.8 MMOL/L (ref 3.7–5.3)
PRO-BNP: 2575 PG/ML
PROTEIN UA: ABNORMAL
RBC # BLD: 3.76 M/UL (ref 3.95–5.11)
RBC UA: NORMAL /HPF (ref 0–4)
RENAL EPITHELIAL, UA: NORMAL /HPF
SODIUM BLD-SCNC: 139 MMOL/L (ref 135–144)
SPECIFIC GRAVITY UA: 1.01 (ref 1–1.03)
TOTAL PROTEIN: 6.7 G/DL (ref 6.4–8.3)
TRICHOMONAS: NORMAL
TRIGL SERPL-MCNC: 132 MG/DL
TSH SERPL DL<=0.05 MIU/L-ACNC: 2.6 MIU/L (ref 0.3–5)
TURBIDITY: CLEAR
URINE HGB: ABNORMAL
UROBILINOGEN, URINE: NORMAL
VITAMIN B-12: 921 PG/ML (ref 232–1245)
VLDLC SERPL CALC-MCNC: NORMAL MG/DL (ref 1–30)
WBC # BLD: 9 K/UL (ref 3.5–11.3)
WBC UA: NORMAL /HPF (ref 0–5)
YEAST: NORMAL

## 2021-12-07 ENCOUNTER — OFFICE VISIT (OUTPATIENT)
Dept: FAMILY MEDICINE CLINIC | Age: 71
End: 2021-12-07
Payer: MEDICARE

## 2021-12-07 VITALS
WEIGHT: 134.4 LBS | TEMPERATURE: 96.5 F | HEIGHT: 59 IN | SYSTOLIC BLOOD PRESSURE: 132 MMHG | DIASTOLIC BLOOD PRESSURE: 69 MMHG | BODY MASS INDEX: 27.09 KG/M2

## 2021-12-07 DIAGNOSIS — L03.116 CELLULITIS OF LEFT LOWER EXTREMITY: ICD-10-CM

## 2021-12-07 DIAGNOSIS — N30.90 CYSTITIS: ICD-10-CM

## 2021-12-07 DIAGNOSIS — I12.9 BENIGN HYPERTENSION WITH CKD (CHRONIC KIDNEY DISEASE) STAGE III (HCC): ICD-10-CM

## 2021-12-07 DIAGNOSIS — I48.0 PAROXYSMAL ATRIAL FIBRILLATION (HCC): ICD-10-CM

## 2021-12-07 DIAGNOSIS — I25.10 CORONARY ARTERY DISEASE INVOLVING NATIVE CORONARY ARTERY OF NATIVE HEART WITHOUT ANGINA PECTORIS: ICD-10-CM

## 2021-12-07 DIAGNOSIS — N18.30 BENIGN HYPERTENSION WITH CKD (CHRONIC KIDNEY DISEASE) STAGE III (HCC): ICD-10-CM

## 2021-12-07 DIAGNOSIS — I50.33 ACUTE ON CHRONIC DIASTOLIC CONGESTIVE HEART FAILURE (HCC): Primary | ICD-10-CM

## 2021-12-07 DIAGNOSIS — E78.5 HYPERLIPIDEMIA WITH TARGET LDL LESS THAN 70: ICD-10-CM

## 2021-12-07 PROCEDURE — G8400 PT W/DXA NO RESULTS DOC: HCPCS | Performed by: FAMILY MEDICINE

## 2021-12-07 PROCEDURE — G8417 CALC BMI ABV UP PARAM F/U: HCPCS | Performed by: FAMILY MEDICINE

## 2021-12-07 PROCEDURE — 3017F COLORECTAL CA SCREEN DOC REV: CPT | Performed by: FAMILY MEDICINE

## 2021-12-07 PROCEDURE — G8484 FLU IMMUNIZE NO ADMIN: HCPCS | Performed by: FAMILY MEDICINE

## 2021-12-07 PROCEDURE — 1090F PRES/ABSN URINE INCON ASSESS: CPT | Performed by: FAMILY MEDICINE

## 2021-12-07 PROCEDURE — 4040F PNEUMOC VAC/ADMIN/RCVD: CPT | Performed by: FAMILY MEDICINE

## 2021-12-07 PROCEDURE — 99214 OFFICE O/P EST MOD 30 MIN: CPT | Performed by: FAMILY MEDICINE

## 2021-12-07 PROCEDURE — 1123F ACP DISCUSS/DSCN MKR DOCD: CPT | Performed by: FAMILY MEDICINE

## 2021-12-07 PROCEDURE — G8427 DOCREV CUR MEDS BY ELIG CLIN: HCPCS | Performed by: FAMILY MEDICINE

## 2021-12-07 PROCEDURE — 1036F TOBACCO NON-USER: CPT | Performed by: FAMILY MEDICINE

## 2021-12-07 RX ORDER — NITROFURANTOIN MACROCRYSTALS 100 MG/1
100 CAPSULE ORAL 4 TIMES DAILY
Qty: 20 CAPSULE | Refills: 0 | Status: SHIPPED | OUTPATIENT
Start: 2021-12-07 | End: 2021-12-12

## 2021-12-07 RX ORDER — MUPIROCIN CALCIUM 20 MG/G
CREAM TOPICAL
Qty: 1 EACH | Refills: 2 | Status: SHIPPED | OUTPATIENT
Start: 2021-12-07 | End: 2022-01-06

## 2021-12-07 RX ORDER — POTASSIUM CHLORIDE 750 MG/1
10 TABLET, FILM COATED, EXTENDED RELEASE ORAL DAILY
Qty: 7 TABLET | Refills: 0 | Status: SHIPPED | OUTPATIENT
Start: 2021-12-07 | End: 2022-01-07 | Stop reason: ALTCHOICE

## 2021-12-07 RX ORDER — FUROSEMIDE 20 MG/1
20 TABLET ORAL DAILY
Qty: 7 TABLET | Refills: 0 | Status: SHIPPED | OUTPATIENT
Start: 2021-12-07 | End: 2022-01-07 | Stop reason: ALTCHOICE

## 2021-12-07 RX ORDER — GAUZE BANDAGE 4"X3.5"
1 SPONGE TOPICAL DAILY
Qty: 30 EACH | Refills: 2 | Status: SHIPPED | OUTPATIENT
Start: 2021-12-07 | End: 2021-12-09 | Stop reason: SDUPTHER

## 2021-12-07 ASSESSMENT — ENCOUNTER SYMPTOMS
COUGH: 0
CHEST TIGHTNESS: 0
COLOR CHANGE: 1
WHEEZING: 0
ABDOMINAL PAIN: 0
SHORTNESS OF BREATH: 1

## 2021-12-07 NOTE — RESULT ENCOUNTER NOTE
Noted address my partner today at the appointment  Future Appointments  5/4/2022   3:30 PM    Martine Mancia MD     [facility]  11/25/2022 3:30 PM    [facility]     [facility]

## 2021-12-07 NOTE — RESULT ENCOUNTER NOTE
Patient scheduled with you today in the afternoon for UTI and  wound    Patient had blood work yesterday please give her the following information, there is hematuria which has been present before, I did send her to urologist, , might need to see them again. She does have history of kidney stones per most recent CT abdomen from 5/25/2021    No urine culture was done, you might consider getting another urine sample for urine culture.   Anemia which is new, likely related to hematuria  BNP is high worse than before, might need more diuresis, however she has been having worsening chronic kidney disease stage III, needs to see the nephrologist.    Alkaline phosphatase is high might need an ultrasound of the liver    Future Appointments  12/7/2021  3:00 PM    MAL Moreau -* [facility]  5/4/2022   3:30 PM    Juvenal Thompson MD     [facility]  11/25/2022 3:30 PM    Juvenal Thompson MD     [facility]

## 2021-12-07 NOTE — PROGRESS NOTES
[address]  [address]  [address] [place] [id number]  Dept: 159 Reg Wang (:  1950) is a 66 y.o. female. Patient is here for evaluation of the following chief complaint(s):  Chief Complaint   Patient presents with    Follow-up     left leg wound     Discuss Labs    Urinary Tract Infection     seeing things that aren't there and this happens alot when she gets an infection         SUBJECTIVE/OBJECTIVE:  HPI  Jaclyn Ramon is a 66 y.o. female patient. Patient is an established patient of Dr. Hussein Anand. Patient has a known history of Congestive Heart Failure, cellulitis, cystitis, Hypertension, ckd, Hyperlipidemia, cad, . She's here to review her blood work. She arrived with her grand daughter who takes care of her. CELLULITIS  Patient with a slow healing wound on left leg. With erythema, edema but seem to be slightly improving other than the increasing edema they're noticing. ALso admits to not able to elevate as much as they want due to comfort. CAD/ CHF-- EXACERBATION  Jaclyn Ramon is a 66 y.o. female with a known history of congestive heart failure. She denies CP or SOB some FLORES, some edema,Patient's current treatment includes : lasix and spironolactone. Patient is compliant all of the time with her therapy/medications. She states she is compliant with low salt diet. Patient is established with Dr. Alyssa Jaime. Lab Results   Component Value Date    LVEF 48 2020   Results for Rogelio Ballard Guanaco Nair (MRN [id number]) as of 2021 18:50   Ref. Range 2021 11:50   Pro-BNP Latest Ref Range: <300 pg/mL 2,575 (H)     ATRIAL FIBRILLATION  Stable, no CP, palpitation, on eliquis,   HYPERTENSION  Jaclyn Ramon has a well controlled hypertension. she is currently on cozaar, Amlodipine,metoprolol. Patient's most recent BP in the office was stable. she reports stable BP readings at home.  Patient denies any adverse reaction to this therapy. she denies any CP, SOB, HA, or palpitations. Patient admits to exercising and adheres to low salt diet. No history of organ damage due to condition noted. Lab Results   Component Value Date/Time    CREATININE 1.48 (H) 12/06/2021 11:50 AM     CHRONIC KIDNEY DISEASE/HYPERURICEMIA  Willow Mccormick has a stage 3a CKD. Patient is under the care of DR. Novak  Encouraged to follow up with nephrologist. On sodium bicarb, magnesium oxide, Allopurinol  Lab Results   Component Value Date/Time    K 4.8 12/06/2021 11:50 AM    BUN 64 (H) 12/06/2021 11:50 AM    CREATININE 1.48 (H) 12/06/2021 11:50 AM    LABGLOM 35 (L) 12/06/2021 11:50 AM    GFRAA 42 (L) 12/06/2021 11:50 AM     Lab Results   Component Value Date    URICACID 5.5 03/11/2020     Lab Results   Component Value Date    MG 1.9 12/06/2021     HYPERLIPIDEMIA  Mariajose Vasquez is currently on atorvastatin (Lipitor). Patient denies adverse reaction to this medication. Compliance with treatment thus far has been good. The patient is known to have coexisting coronary artery disease. The CVD Risk score (D'Agostino, et al., 2008) failed to calculate for the following reasons: The patient has a prior MI, stroke, CHF, or peripheral vascular disease diagnosis  Lab Results   Component Value Date/Time    CHOL 135 12/06/2021 11:50 AM    HDL 59 12/06/2021 11:50 AM    LDLCHOLESTEROL 50 12/06/2021 11:50 AM    CHOLHDLRATIO 2.3 12/06/2021 11:50 AM    TRIG 132 12/06/2021 11:50 AM    VLDL NOT REPORTED 12/06/2021 11:50 AM       Patient's blood count   Lab Results   Component Value Date    WBC 9.0 12/06/2021    HGB 11.0 (L) 12/06/2021    HCT 37.5 12/06/2021    MCV 99.7 12/06/2021     12/06/2021    LYMPHOPCT 17 (L) 05/25/2021    RBC 3.76 (L) 12/06/2021    MCH 29.3 12/06/2021    MCHC 29.3 12/06/2021    RDW 16.4 (H) 12/06/2021       THYROID FUNCTION  Anshul Ojeda  's most recent TSH level was normal. Results reviewed with the patient.  Patient denies any history of thyroid disorders. Lab Results   Component Value Date/Time    TSH 2.60 12/06/2021 11:50 AM        [place]   Lab Results   Component Value Date     12/06/2021    K 4.8 12/06/2021     12/06/2021    CO2 23 12/06/2021    BUN 64 (H) 12/06/2021    CREATININE 1.48 (H) 12/06/2021    GLUCOSE 116 (H) 12/06/2021    CALCIUM 9.4 12/06/2021    PROT 6.7 12/06/2021    GFR      12/06/2021    GFR NOT REPORTED 12/06/2021       LFT's   Lab Results   Component Value Date/Time    ALKPHOS 167 (H) 12/06/2021 11:50 AM    ALT 17 12/06/2021 11:50 AM    AST 29 12/06/2021 11:50 AM    BILITOT 0.24 (L) 12/06/2021 11:50 AM    PROT 6.7 12/06/2021 11:50 AM    LABALBU 3.8 12/06/2021 11:50 AM        Patient's urinalysis results CYSTITIS  WIll start macrodantin. Lab Results   Component Value Date    COLORU Yellow 12/06/2021    NITRU NEGATIVE 12/06/2021    GLUCOSEU NEGATIVE 12/06/2021    KETUA NEGATIVE 12/06/2021    UROBILINOGEN Normal 12/06/2021    BILIRUBINUR NEGATIVE 12/06/2021        VITAL SIGNS:  Vitals:    12/07/21 1504   BP: 132/69   Temp: 96.5 °F (35.8 °C)   Weight: 134 lb 6.4 oz (61 kg)   Height: 4' 11\" (1.499 m)   Estimated body mass index is 27.15 kg/m² as calculated from the following:    Height as of this encounter: 4' 11\" (1.499 m). Weight as of this encounter: 134 lb 6.4 oz (61 kg). Review of Systems   Constitutional: Positive for activity change. Negative for chills, fatigue and fever. HENT: Positive for hearing loss. Respiratory: Positive for shortness of breath (on exertion). Negative for cough, chest tightness and wheezing. Cardiovascular: Positive for leg swelling. Negative for chest pain and palpitations. Gastrointestinal: Negative for abdominal pain. Endocrine: Negative. Genitourinary: Negative for dysuria and hematuria. Musculoskeletal: Positive for arthralgias and myalgias. Skin: Positive for color change, rash and wound. Neurological: Negative for headaches.    Psychiatric/Behavioral: Positive for dysphoric mood. Negative for sleep disturbance and suicidal ideas. The patient is nervous/anxious. Physical Exam  Vitals and nursing note reviewed. Constitutional:       Appearance: She is well-developed. HENT:      Head: Normocephalic. Right Ear: External ear normal. Decreased hearing noted. Left Ear: External ear normal. Decreased hearing noted. Nose: Nose normal.      Mouth/Throat:      Mouth: Mucous membranes are moist.   Eyes:      Pupils: Pupils are equal, round, and reactive to light. Cardiovascular:      Rate and Rhythm: Normal rate and regular rhythm. Pulses: Normal pulses. Heart sounds: Normal heart sounds. Pulmonary:      Effort: Pulmonary effort is normal. No respiratory distress. Breath sounds: Examination of the right-lower field reveals decreased breath sounds. Decreased breath sounds present. Abdominal:      General: Abdomen is protuberant. Bowel sounds are normal. There is no distension. Palpations: Abdomen is soft. Tenderness: There is no abdominal tenderness. Comments: obese   Musculoskeletal:         General: Normal range of motion. Cervical back: Normal range of motion and neck supple. Right lower le+ Edema present. Left lower le+ Edema present. Comments: Walks with a walker   Skin:     General: Skin is warm and dry. Capillary Refill: Capillary refill takes less than 2 seconds. Findings: Erythema and wound present. Neurological:      Mental Status: She is alert and oriented to person, place, and time. Motor: No weakness. Gait: Gait abnormal.      Comments: Delayed get up and go test   Psychiatric:         Mood and Affect: Mood is anxious. Speech: Speech is delayed. Behavior: Behavior is withdrawn. Thought Content: Thought content does not include suicidal ideation.          MEDICAL HISTORY      Diagnosis Date    Acute CVA [facility]cerebrovascular accident) ([facility] 7/25/2020    Altered mental status 5/26/2021    Arthritis     Brain mass     Cellulitis and abscess     Cellulitis and abscess of unspecified site     Cellulitis of both lower extremities 5/26/2021    Cellulitis of left lower extremity 7/26/2020    Cellulitis of lower extremity 10/4/2020    CHF (congestive heart failure) (HCC)     Chronic kidney disease, unspecified     Coronary atherosclerosis of native coronary artery     Essential hypertension 7/25/2020    Essential hypertension, malignant     Hallucinations 2/18/2021    Hard of hearing     Head contusion 9/9/2020    Hypokalemia 9/9/2020    Iron deficiency anemia, unspecified     Meningioma ([facility] 2/25/2021    Myocardial infarction ([facility]     Other and unspecified hyperlipidemia     Pure hypercholesterolemia     Toxic metabolic encephalopathy 6/07/2625    Type II or unspecified type diabetes mellitus without mention of complication, not stated as uncontrolled     Unspecified asthma(493.90)     Unspecified venous (peripheral) insufficiency     Unspecified vitamin D deficiency     UTI (urinary tract infection) 2/18/2021      MEDICATIONS  Prior to Visit Medications    Medication Sig Taking? Authorizing Provider   mupirocin (BACTROBAN) 2 % cream Apply 3 times daily.  Yes Renirish Hopemis, APRN - CNP   Bismuth Tribromoph-Petrolatum (XEROFORM PETROLATUM PATCH 2\"X2\") PADS external pads Apply 1 each topically daily Yes Renella SAHARA Skelton APRN - CNP   furosemide (LASIX) 20 MG tablet Take 1 tablet by mouth daily for 7 days Yes Renella SAHARA Skelton APRN - CNP   potassium chloride (KLOR-CON) 10 MEQ extended release tablet Take 1 tablet by mouth daily for 7 days Yes Renella A Nafisa APRN - CNP   nitrofurantoin (MACRODANTIN) 100 MG capsule Take 1 capsule by mouth 4 times daily for 5 days Yes Renella SAHARA Skelton APRN - CNP   magnesium oxide (MAG-OX) 400 (241.3 Mg) MG TABS tablet  Yes Historical Provider, MD   desloratadine (CLARINEX) 5 MG tablet Take 1 tablet by mouth once daily Yes Marvel Kenney MD   amLODIPine (NORVASC) 5 MG tablet Take 1 tablet by mouth daily Yes Marvel Kenney MD   vitamin D (ERGOCALCIFEROL) 1.25 MG (59347 UT) CAPS capsule Take 1 capsule by mouth once a week Yes Quincy Blanco MD   clopidogrel (PLAVIX) 75 MG tablet Take 1 tablet by mouth daily Yes Marvel Kenney MD   losartan (COZAAR) 25 MG tablet Take 1 tablet by mouth daily Yes Marvel Kenney MD   colchicine (COLCRYS) 0.6 MG tablet Take 0.6 mg by mouth daily Yes Historical Provider, MD   diclofenac sodium (VOLTAREN) 1 % GEL Apply 4 g topically 3 times daily Yes Historical Provider, MD   furosemide (LASIX) 20 MG tablet Take 1 tablet by mouth daily Yes Cecilia Clarke MD   metoprolol tartrate (LOPRESSOR) 50 MG tablet Take 1 tablet by mouth 2 times daily Yes Marvel Kenney MD   miconazole (MICOTIN) 2 % powder Apply topically 2 times daily. Yes Sara Jackson MD   magnesium oxide (MAG-OX) 400 MG tablet Take 1 tablet by mouth 2 times daily Yes Cecilia Clarke MD   sodium bicarbonate 650 MG tablet Take 1 tablet by mouth 3 times daily Yes Cecilia Clarke MD   blood glucose test strips (FREESTYLE LITE) strip 1 each by In Vitro route daily As needed.  Yes Marvel Kenney MD   FreeStyle Lancets MISC 1 each by Does not apply route daily Patient needs to contact office before any further refills will be approved Yes Marvel Kenney MD   allopurinol (ZYLOPRIM) 300 MG tablet Take 1 tablet by mouth daily Per rheumatologist Yes Marvel Kenney MD   spironolactone (ALDACTONE) 25 MG tablet Take 1 tablet by mouth every morning (before breakfast) Water pill Yes Marvel Kenney MD   atorvastatin (LIPITOR) 40 MG tablet Take 1 tablet by mouth daily Yes Marvel Kenney MD   albuterol sulfate HFA (PROAIR HFA) 108 (90 Base) MCG/ACT inhaler Inhale 2 puffs into the lungs every 6 hours as needed for Wheezing or Shortness of Breath (cough) Yes Whit Michelle MD   apixaban (ELIQUIS) 5 MG TABS tablet Take 1 tablet by mouth 2 times daily Yes Jenn Certain, APRN - CNP   Multiple Vitamins-Minerals (THERAPEUTIC MULTIVITAMIN-MINERALS) tablet Take 1 tablet by mouth daily Yes Historical Provider, MD   Blood Pressure KIT 1 kit by Does not apply route three times daily Yes Addis Kolb MD   blood glucose monitor kit and supplies 1 kit by Other route three times daily Dispense Butterfly Elite CBG Device Yes Neo Morales MD     Controlled Substance Monitoring:  Acute and Chronic Pain Monitoring:   No flowsheet data found. ASSESSMENT/PLAN:  1. Acute on chronic diastolic congestive heart failure (HCC)  Stable  Added lasix for 7 days  Encouraged to follow up with cardiologist  Continue with the therapy. DISCUSSED and ADVISED TO:  Weigh daily and log. Keep regular activity as tolerated. Cut down on salt intake. Keep appointment with the cardiologist.  Go to the ER for CP and SOB not relieved with rest.    - furosemide (LASIX) 20 MG tablet; Take 1 tablet by mouth daily for 7 days  Dispense: 7 tablet; Refill: 0  - potassium chloride (KLOR-CON) 10 MEQ extended release tablet; Take 1 tablet by mouth daily for 7 days  Dispense: 7 tablet; Refill: 0    2. Cellulitis of left lower extremity  Worsening  Advised to Keep site clean and dry. DISCUSSED AND ADVISED TO:  Apply antibiotic ointment sparingly to site for the next few days. Keep open to air as much as possible. Apply dressing if needed. No pool, lakes, hot tubs until fully healed. Call for worsening redness, streaking, swelling, drainage, pain, and fever/chills. - mupirocin (BACTROBAN) 2 % cream; Apply 3 times daily. Dispense: 1 each; Refill: 2  - Bismuth Tribromoph-Petrolatum (XEROFORM PETROLATUM PATCH 2\"X2\") PADS external pads; Apply 1 each topically daily  Dispense: 30 each; Refill: 2    3.  Cystitis  Failure to Improve  Start macrodantin  DISCUSSED AND ADVISED TO:  Drink lots of fluids. Report worsening symptoms, fever, chills. Urine culture report in 2-3 days. - nitrofurantoin (MACRODANTIN) 100 MG capsule; Take 1 capsule by mouth 4 times daily for 5 days  Dispense: 20 capsule; Refill: 0    4. HTN. Benign hypertension with CKD (chronic kidney disease) stage III (HCC)  Stable  Continue current therapy. DISCUSSED AND ADVISED TO:  Cut down on your salt intake. Cut down on caffeinated drinks, sports drinks. Instructed to check BP at home regularly. Report for any chest pains, shortness of breath, headaches, and lightheadedness. Call the office if your blood pressure continue to be higher than 140/90 or 90/50.        5. Coronary artery disease involving native coronary artery of native heart without angina pectoris  Stable  DISCUSSED and ADVISED TO:  Discussed serious causes of CP. Advised to go to the ER for worsening CP/SOB           6. Paroxysmal atrial fibrillation (HCC)  Stable  DISCUSSED and ADVISED TO:  Discussed serious causes of CP. Advised to go to the ER for worsening CP/SOB           7. Hyperlipidemia with target LDL less than 70  Stable  Continue therapy. Advised to decrease the consumption of red meats, fried foods, trans fats, sweets, sugary beverages. Advised to increase fish, vegetables, and fruits consumption. Advised to add fiber or OTC supplements in diet. Discussed weight loss which will result in improvement of lipids levels. Advised to increase daily physical activities and add regular exercises. On this date 12/7/2021 I have spent 30 minutes reviewing previous notes, test results and face to face with the patient discussing the diagnosis and importance of compliance with the treatment plan as well as documenting on the day of the visit. Return for Chronic conditions, Appt w/ Dr. Shalonda Briceno.     This note was completed by using the assistance of a speech-recognition program. However, inadvertent computerized transcription errors may be present. Although every effort was made to ensure accuracy, no guarantees can be provided that every mistake has been identified and corrected by editing.   Electronically signed by MAL Rose CNP on 12/7/21 at 12:26 PM EST     --MAL Moreau CNP

## 2021-12-09 DIAGNOSIS — L03.116 CELLULITIS OF LEFT LOWER EXTREMITY: ICD-10-CM

## 2021-12-09 RX ORDER — GAUZE BANDAGE 4"X3.5"
1 SPONGE TOPICAL DAILY
Qty: 30 EACH | Refills: 2 | Status: SHIPPED | OUTPATIENT
Start: 2021-12-09 | End: 2021-12-10 | Stop reason: SDUPTHER

## 2021-12-09 NOTE — TELEPHONE ENCOUNTER
PATIENTS DAUGHTER CALLED STATING THE PHARMACY DOESN'T CARRY THE   Bismuth Tribromoph-Petrolatum (XEROFORM PETROLATUM PATCH 2\"X2\") PADS external pads     [facility] HERE IN [place] STATES THEY CAN ORDER THIS CAN YOU RESEND THIS TO THAT PHARMACY   PLEASE ADVISE

## 2021-12-10 RX ORDER — GAUZE BANDAGE 4"X3.5"
1 SPONGE TOPICAL DAILY
Qty: 30 EACH | Refills: 2 | Status: SHIPPED | OUTPATIENT
Start: 2021-12-10 | End: 2022-05-25 | Stop reason: ALTCHOICE

## 2021-12-20 DIAGNOSIS — E78.5 HYPERLIPIDEMIA WITH TARGET LDL LESS THAN 70: ICD-10-CM

## 2021-12-20 DIAGNOSIS — I50.32 CHRONIC DIASTOLIC CHF (CONGESTIVE HEART FAILURE) (HCC): ICD-10-CM

## 2021-12-20 DIAGNOSIS — N18.30 BENIGN HYPERTENSION WITH CKD (CHRONIC KIDNEY DISEASE) STAGE III (HCC): ICD-10-CM

## 2021-12-20 DIAGNOSIS — I12.9 BENIGN HYPERTENSION WITH CKD (CHRONIC KIDNEY DISEASE) STAGE III (HCC): ICD-10-CM

## 2021-12-20 RX ORDER — SPIRONOLACTONE 25 MG/1
TABLET ORAL
Qty: 90 TABLET | Refills: 3 | Status: SHIPPED | OUTPATIENT
Start: 2021-12-20 | End: 2022-01-15 | Stop reason: HOSPADM

## 2021-12-20 RX ORDER — ATORVASTATIN CALCIUM 40 MG/1
TABLET, FILM COATED ORAL
Qty: 90 TABLET | Refills: 3 | Status: SHIPPED | OUTPATIENT
Start: 2021-12-20 | End: 2022-02-15 | Stop reason: SDUPTHER

## 2022-01-06 ENCOUNTER — NURSE TRIAGE (OUTPATIENT)
Dept: OTHER | Facility: CLINIC | Age: 72
End: 2022-01-06

## 2022-01-06 NOTE — TELEPHONE ENCOUNTER
Received call from Kulwinder Tamez at [facility] with [employer]. Subjective: Caller states \"skin condition on her legs, like blisters that pop and drain, think it is related to her heart\"     Current Symptoms: bilateral leg red, blistered and swollen. yellow drainage. Left leg worse than right    Onset: 1 month ago; gradual    Associated Symptoms: NA    Pain Severity: \"feels like acid on her skin when the blisters pop\" 4/10 when initially applying the cream    Temperature: No     What has been tried: wrapping, cream    LMP: no Pregnant: No    Recommended disposition: See PCP within 24 hours    Care advice provided, patient verbalizes understanding; denies any other questions or concerns; instructed to call back for any new or worsening symptoms. Writer provided warm transfer to [facility] at [facility] for appointment scheduling     Attention Provider: Thank you for allowing me to participate in the care of your patient. The patient was connected to triage in response to information provided to the ECC/PSC. Please do not respond through this encounter as the response is not directed to a shared pool.             Reason for Disposition   [1] Finished taking antibiotic AND [2] cellulitis symptoms are WORSE (e.g., redness, pain  drainage, swelling)    Protocols used: CELLULITIS ON ANTIBIOTIC FOLLOW-UP CALL-ADULT-

## 2022-01-06 NOTE — TELEPHONE ENCOUNTER
Patient is scheduled for tomorrow  Future Appointments   Date Time Provider Angela Aissatou   1/7/2022  3:45 PM Mark Anthony Reid MD [facility]   5/4/2022  3:30 PM Mark Anthony Reid MD [facility]   11/25/2022  3:30 PM Mark Anthony Reid MD [facility]

## 2022-01-07 ENCOUNTER — OFFICE VISIT (OUTPATIENT)
Dept: FAMILY MEDICINE CLINIC | Age: 72
End: 2022-01-07
Payer: MEDICARE

## 2022-01-07 VITALS
DIASTOLIC BLOOD PRESSURE: 70 MMHG | TEMPERATURE: 98 F | HEART RATE: 78 BPM | HEIGHT: 59 IN | SYSTOLIC BLOOD PRESSURE: 126 MMHG | WEIGHT: 143 LBS | BODY MASS INDEX: 28.83 KG/M2 | OXYGEN SATURATION: 98 %

## 2022-01-07 DIAGNOSIS — Z28.21 COVID-19 VACCINATION DECLINED: ICD-10-CM

## 2022-01-07 DIAGNOSIS — I12.9 BENIGN HYPERTENSION WITH CKD (CHRONIC KIDNEY DISEASE) STAGE III (HCC): ICD-10-CM

## 2022-01-07 DIAGNOSIS — I50.33 ACUTE ON CHRONIC DIASTOLIC CONGESTIVE HEART FAILURE (HCC): ICD-10-CM

## 2022-01-07 DIAGNOSIS — I48.0 PAROXYSMAL ATRIAL FIBRILLATION (HCC): ICD-10-CM

## 2022-01-07 DIAGNOSIS — L03.119 CELLULITIS OF LOWER EXTREMITY, UNSPECIFIED LATERALITY: Primary | ICD-10-CM

## 2022-01-07 DIAGNOSIS — B37.2 CANDIDAL INTERTRIGO: ICD-10-CM

## 2022-01-07 DIAGNOSIS — N18.30 BENIGN HYPERTENSION WITH CKD (CHRONIC KIDNEY DISEASE) STAGE III (HCC): ICD-10-CM

## 2022-01-07 PROCEDURE — 1090F PRES/ABSN URINE INCON ASSESS: CPT | Performed by: FAMILY MEDICINE

## 2022-01-07 PROCEDURE — G8427 DOCREV CUR MEDS BY ELIG CLIN: HCPCS | Performed by: FAMILY MEDICINE

## 2022-01-07 PROCEDURE — G8417 CALC BMI ABV UP PARAM F/U: HCPCS | Performed by: FAMILY MEDICINE

## 2022-01-07 PROCEDURE — G8484 FLU IMMUNIZE NO ADMIN: HCPCS | Performed by: FAMILY MEDICINE

## 2022-01-07 PROCEDURE — 1123F ACP DISCUSS/DSCN MKR DOCD: CPT | Performed by: FAMILY MEDICINE

## 2022-01-07 PROCEDURE — 3017F COLORECTAL CA SCREEN DOC REV: CPT | Performed by: FAMILY MEDICINE

## 2022-01-07 PROCEDURE — G8400 PT W/DXA NO RESULTS DOC: HCPCS | Performed by: FAMILY MEDICINE

## 2022-01-07 PROCEDURE — 1036F TOBACCO NON-USER: CPT | Performed by: FAMILY MEDICINE

## 2022-01-07 PROCEDURE — 4040F PNEUMOC VAC/ADMIN/RCVD: CPT | Performed by: FAMILY MEDICINE

## 2022-01-07 PROCEDURE — 99214 OFFICE O/P EST MOD 30 MIN: CPT | Performed by: FAMILY MEDICINE

## 2022-01-07 RX ORDER — FUROSEMIDE 40 MG/1
40 TABLET ORAL DAILY
Qty: 90 TABLET | Refills: 2 | Status: SHIPPED | OUTPATIENT
Start: 2022-01-07 | End: 2022-01-19 | Stop reason: HOSPADM

## 2022-01-07 RX ORDER — DOXYCYCLINE HYCLATE 100 MG
100 TABLET ORAL 2 TIMES DAILY
Qty: 20 TABLET | Refills: 0 | Status: SHIPPED | OUTPATIENT
Start: 2022-01-07 | End: 2022-01-17

## 2022-01-07 RX ORDER — CHLORHEXIDINE GLUCONATE 213 G/1000ML
SOLUTION TOPICAL
Qty: 946 ML | Refills: 2 | Status: SHIPPED | OUTPATIENT
Start: 2022-01-07 | End: 2022-02-15 | Stop reason: SDUPTHER

## 2022-01-07 RX ORDER — MUPIROCIN CALCIUM 20 MG/G
CREAM TOPICAL
Qty: 30 G | Refills: 3 | Status: ON HOLD | OUTPATIENT
Start: 2022-01-07 | End: 2022-02-10

## 2022-01-07 ASSESSMENT — ENCOUNTER SYMPTOMS
CHEST TIGHTNESS: 0
BACK PAIN: 1
BLOOD IN STOOL: 0
SHORTNESS OF BREATH: 0
NAUSEA: 0
COUGH: 0
CONSTIPATION: 0
COLOR CHANGE: 1
ABDOMINAL DISTENTION: 0
WHEEZING: 0
DIARRHEA: 1
VOMITING: 0
ABDOMINAL PAIN: 0

## 2022-01-07 ASSESSMENT — PATIENT HEALTH QUESTIONNAIRE - PHQ9
SUM OF ALL RESPONSES TO PHQ9 QUESTIONS 1 & 2: 0
2. FEELING DOWN, DEPRESSED OR HOPELESS: 0
SUM OF ALL RESPONSES TO PHQ QUESTIONS 1-9: 0
1. LITTLE INTEREST OR PLEASURE IN DOING THINGS: 0
SUM OF ALL RESPONSES TO PHQ QUESTIONS 1-9: 0

## 2022-01-07 NOTE — PROGRESS NOTES
Visit Information    Have you changed or started any medications since your last visit including any over-the-counter medicines, vitamins, or herbal medicines? no   Are you having any side effects from any of your medications? -  no  Have you stopped taking any of your medications? Is so, why? -  no    Have you seen any other physician or provider since your last visit? No  Have you had any other diagnostic tests since your last visit? No  Have you been seen in the emergency room and/or had an admission to a hospital since we last saw you? No  Have you had your routine dental cleaning in the past 6 months? no    Have you activated your [employer] account? If not, what are your barriers?  Yes     Patient Care Team:  Nic Briones MD as PCP - General (Family Medicine)  Nic Briones MD as PCP - [facility]  Benjy Johnston MD as Consulting Physician (Rheumatology)  Sherly Noriega MD as Surgeon (Cardiology)  Rubina Elizabeth MD as Consulting Physician (Nephrology)  Pradip Pate MD as Consulting Physician (Neurology)  Rubina Elizabeth MD as Consulting Physician (Nephrology)  Fredy Roe MD as Consulting Physician (Urology)  Gerardo Singh MD as Surgeon (Ophthalmology)    Medical History Review  Past Medical, Family, and Social History reviewed and does contribute to the patient presenting condition    Health Maintenance   Topic Date Due    COVID-19 Vaccine (1) Never done    DTaP/Tdap/Td vaccine (1 - Tdap) Never done    Shingles Vaccine (1 of 2) Never done    Pneumococcal 65+ years Vaccine (1 of 1 - PPSV23) Never done    Colon Cancer Screen FIT/FOBT  03/29/2018    Diabetic retinal exam  10/19/2021    Flu vaccine (1) 06/30/2022 (Originally 9/1/2021)    Breast cancer screen  08/28/2022    Diabetic foot exam  09/22/2022    A1C test (Diabetic or Prediabetic)  11/24/2022    Depression Screen  11/24/2022    Annual Wellness Visit (AWV)  11/25/2022    Lipid screen 12/06/2022    Potassium monitoring  12/06/2022    Creatinine monitoring  12/06/2022    DEXA (modify frequency per FRAX score)  Completed    Hepatitis C screen  Completed    Hepatitis A vaccine  Aged Out    Hib vaccine  Aged Out    Meningococcal (ACWY) vaccine  Aged Out

## 2022-01-07 NOTE — PROGRESS NOTES
Clara Ojeda (:  1950) is a 66 y.o. female,Established patient, here for evaluation of the following chief complaint(s): Leg Swelling (BILATERAL - BLISTERS YELLOW DRAINAGE SOME REDNESS X 1 MONTH LEFT LEG WORSE )      ASSESSMENT/PLAN:    1. Cellulitis of lower extremity, unspecified laterality  Bilateral   Worsening     -     mupirocin (BACTROBAN) 2 % cream; Apply 2 times dailY X 10 DAYS ON OPEN BLISTERS ON THE LEGS., Disp-30 g, R-3, Normal  -     chlorhexidine (HIBICLENS) 4 % external liquid; for decontamination AND WASH LEGS EVERY DAY, Disp-946 mL, R-2Normal  -     doxycycline hyclate (VIBRA-TABS) 100 MG tablet; Take 1 tablet by mouth 2 times daily for 10 days WITH FOOD, FOR CELLULITIS, Disp-20 tablet, R-0Normal    Careful review of urgent symptoms and need for immediate medical attention if condition worsens. Patient expressed understanding. Advised to go to ED if severe symptoms develop. Patient expressed understanding. 2. Acute on chronic diastolic congestive heart failure (HCC)  worsening    -     furosemide (LASIX) 40 MG tablet; Take 1 tablet by mouth daily Dose increased 2022, Disp-90 tablet, R-2Normal  Labs in 1 week    -     Brain Natriuretic Peptide; Future  -     Magnesium; Future  -     CBC; Future  -     Comprehensive Metabolic Panel; Future  -     Phosphorus; Future  Abstain from salt  Continue losartan 25 mg, metoprolol 50 mg twice a day, Aldactone 25 mg daily  Discussed low salt diet and BP, WT and pulse monitoring. 3. Benign hypertension with CKD (chronic kidney disease) stage III (HCC)  Worsening  GFR 35 on 2021, was 53 on 2021  Well-controlled hypertension  -    Dose increased furosemide (LASIX) 40 MG tablet; Take 1 tablet by mouth daily Dose increased 2022, Disp-90 tablet, R-2Normal  -     Magnesium; Future  -     CBC; Future  -     Comprehensive Metabolic Panel; Future  -     Phosphorus;  Future  Continue losartan 25 mg, metoprolol 50 mg twice a day, Aldactone 25 mg daily  Discussed low salt diet and BP, WT and pulse monitoring. 4. Paroxysmal atrial fibrillation (HCC)  Stable  Rate controlled with metoprolol 50 mg twice a day, chronic anticoagulation with Eliquis 5 mg twice a day    5. Candidal intertrigo under the breasts  -     miconazole (MICOTIN) 2 % powder; Apply topically 2 times daily UNDER THE SKIN FOLDS LONG TERM, Disp-45 g, R-1, Normal  6. COVID-19 vaccination declined  Patient absolutely declines COVID-19 vaccine despite counseling    Niya Tellze received counseling on the following healthy behaviors: nutrition, exercise and medication adherence and falls precautions    Reviewed prior labs and health maintenance  Discussed use, benefit, and side effects of prescribed medications. Barriers to medication compliance addressed. Patient given educational materials - see patient instructions  All patient questions answered. Patient voiced understanding. The patient's past medical,surgical, social, and family history as well as her current medications and allergies were reviewed as documented in today's encounter. Medications, labs, diagnostic studies, consultations and follow-up as documented in this encounter. Return for KEEP APPT. Data Unavailable    Future Appointments   Date Time Provider Angela Reyez   5/4/2022  3:30 PM Marta George MD fp sc [id number]   11/25/2022  3:30 PM [facility]       SUBJECTIVE/OBJECTIVE:      Comes with her daughter Tom Mcdaniels is very hard of hearing. Shauna Dovet has to repeat everything I say to her mom    Niya Tellez complains of worsening swelling of both legs, blistering, reports having multiple small blisters and some of them are open and seeping, left leg seems to be worse, the redness and the warmth is getting worse and she feels she has another infection of her legs. Patient says symptoms have been present for a few weeks progressively getting worse.   Denies fever, chills, night sweats. Has difficulty keeping the legs per her report. Does not wear the compression stockings because so many blisters are open and draining. Saw Pippa Chin on 12/7/2021 who prescribed her Xeroform gauze, but does not feel it is helping. Barbara Miller says Ole Hayes lives with Barbara Miller brother who has been helping her. Patient says he has been chills doing her wrapping. Hypertension, CHF, Chronic kidney disease, Paroxismal AFib  :    she  is not exercising and is not adherent to low salt diet. Blood pressure is well controlled at home. Cardiac symptoms fatigue, lower extremity edema and palpitations. Patient denies chest pain, chest pressure/discomfort, claudication, dyspnea, exertional chest pressure/discomfort, irregular heart beat, near-syncope, orthopnea, paroxysmal nocturnal dyspnea, syncope and tachypnea. Cardiovascular risk factors: advanced age (older than 54 for men, 72 for women), diabetes mellitus, dyslipidemia, hypertension and sedentary lifestyle. Use of agents associated with hypertension: none. History of target organ damage: chronic kidney disease and heart failure, coronary artery disease, has stents and history of stroke . Had ham and salt recently    Taking furosemide 20 mg \" as needed\" but it is not helping. BNP was recently, high worsening      Component Ref Range & Units 12/6/21 1150 3/18/21 1415 3/11/20 1117   Pro-BNP <300 pg/mL 2,575 High   2,180 High  CM  627 High  CM    Comment:        An age-independent cutoff point of 300 pg/ml has a 98% negative predictive value excluding   acute heart failure.       Lab Results   Component Value Date    LVEF 48 07/30/2020         blood pressure is Normal.    BP Readings from Last 3 Encounters:   01/07/22 126/70   12/07/21 132/69   11/24/21 132/74        Pulse is Normal.    Pulse Readings from Last 3 Encounters:   01/07/22 78   11/24/21 77   09/22/21 70       Weight is increased,  unintentional weight gain of 9 lbs in 1 month  Wt Readings from Last 3 Encounters:   01/07/22 143 lb (64.9 kg)   12/07/21 134 lb 6.4 oz (61 kg)   11/24/21 132 lb (59.9 kg)       Patient reports rash under the breast for which she uses antifungal powder, which helps. [x]Negative depression screening. PHQ Scores 1/7/2022 11/24/2021 9/22/2021 3/12/2021 2/20/2020 1/10/2020 2/13/2019   PHQ2 Score 0 0 0 1 0 0 0   PHQ9 Score 0 0 0 1 0 0 0         Prior to Visit Medications    Medication Sig Taking?  Authorizing Provider   miconazole (MICOTIN) 2 % powder Apply topically 2 times daily UNDER THE SKIN FOLDS LONG TERM Yes Nan Vela MD   furosemide (LASIX) 20 MG tablet Take 1 tablet by mouth daily Dose i Yes Nan Vela MD   atorvastatin (LIPITOR) 40 MG tablet Take 1 tablet by mouth once daily Yes Nan Vela MD   spironolactone (ALDACTONE) 25 MG tablet TAKE 1 TABLET BY MOUTH IN THE MORNING BEFORE BREAKFAST WATER  PILL Yes Nan Vela MD   Bismuth Tribromoph-Petrolatum (XEROFORM PETROLATUM PATCH 2\"X2\") PADS external pads Apply 1 each topically daily Yes Ruby Skelton, APRN - CNP   magnesium oxide (MAG-OX) 400 (241.3 Mg) MG TABS tablet  Yes Historical Provider, MD   desloratadine (CLARINEX) 5 MG tablet Take 1 tablet by mouth once daily Yes Nan Vela MD   amLODIPine (NORVASC) 5 MG tablet Take 1 tablet by mouth daily Yes Nan Vela MD   vitamin D (ERGOCALCIFEROL) 1.25 MG (84497 UT) CAPS capsule Take 1 capsule by mouth once a week Yes Sherita Felipe MD   clopidogrel (PLAVIX) 75 MG tablet Take 1 tablet by mouth daily Yes Nan Vela MD   losartan (COZAAR) 25 MG tablet Take 1 tablet by mouth daily Yes Nan Vela MD   colchicine (COLCRYS) 0.6 MG tablet Take 0.6 mg by mouth daily Yes Historical Provider, MD   metoprolol tartrate (LOPRESSOR) 50 MG tablet Take 1 tablet by mouth 2 times daily Yes Nan Vela MD   sodium bicarbonate 650 MG tablet Take 1 tablet by mouth 3 times daily Yes Fan [facility], MD   blood glucose test strips (FREESTYLE LITE) strip 1 each by In Vitro route daily As needed. Yes Mark Delgado MD   FreeStyle Lancets MISC 1 each by Does not apply route daily Patient needs to contact office before any further refills will be approved Yes Mark Delgado MD   allopurinol (ZYLOPRIM) 300 MG tablet Take 1 tablet by mouth daily Per rheumatologist Yes Mark Delgado MD   apixaban (ELIQUIS) 5 MG TABS tablet Take 1 tablet by mouth 2 times daily Yes Piyush Dunaway APRN - CNP   Multiple Vitamins-Minerals (THERAPEUTIC MULTIVITAMIN-MINERALS) tablet Take 1 tablet by mouth daily Yes Historical Provider, MD   Blood Pressure KIT 1 kit by Does not apply route three times daily Yes Osito Dowling MD   blood glucose monitor kit and supplies 1 kit by Other route three times daily Dispense Butterfly Elite CBG Device Yes Jailyn Pulido MD   magnesium oxide (MAG-OX) 400 (241.3 Mg) MG TABS tablet Take 1 tablet by mouth twice daily  Patient not taking: Reported on 2022  [facility], MD   albuterol sulfate HFA (PROAIR HFA) 108 (90 Base) MCG/ACT inhaler Inhale 2 puffs into the lungs every 6 hours as needed for Wheezing or Shortness of Breath (cough)  Patient not taking: Reported on 2022  Mark Delgado MD       Social History     Tobacco Use    Smoking status: Former Smoker     Packs/day: 0.25     Years: 10.00     Pack years: 2.50     Quit date: 2000     Years since quittin.0    Smokeless tobacco: Never Used   Vaping Use    Vaping Use: Never used   Substance Use Topics    Alcohol use: Not Currently     Alcohol/week: 0.0 standard drinks    Drug use: No       Review of Systems   Constitutional: Positive for fatigue and unexpected weight change. Negative for activity change, appetite change, chills, diaphoresis and fever. HENT: Positive for hearing loss. Eyes: Positive for visual disturbance (stable, chronic).    Respiratory: Negative for cough, chest tightness, shortness of breath and wheezing. Cardiovascular: Positive for palpitations and leg swelling. Negative for chest pain. Gastrointestinal: Positive for diarrhea (on and off, chronic, declines colonoscopy). Negative for abdominal distention, abdominal pain, blood in stool, constipation, nausea and vomiting. Endocrine: Negative for cold intolerance, heat intolerance, polydipsia, polyphagia and polyuria. Musculoskeletal: Positive for arthralgias, back pain and gait problem. Ambulates with walker with seat   Skin: Positive for color change and rash. Redness bilateral legs with multiple blisters, worse on the left leg, draining . Also rash under the breasts. Neurological: Positive for numbness (feet and legs, chronic). Hematological: Does not bruise/bleed easily.         -vital signs stable and within normal limits except Overweight per BMI. /70   Pulse 78   Temp 98 °F (36.7 °C)   Ht 4' 11\" (1.499 m)   Wt 143 lb (64.9 kg)   SpO2 98%   BMI 28.88 kg/m²        Physical Exam  Vitals and nursing note reviewed. Constitutional:       General: She is not in acute distress. Appearance: Normal appearance. She is well-developed. She is not diaphoretic. HENT:      Head: Normocephalic and atraumatic. Right Ear: External ear normal. Decreased hearing noted. Left Ear: External ear normal. Decreased hearing noted. Nose: No mucosal edema. Mouth/Throat:      Comments: I did not examine the mouth due to coronavirus pandemic and wearing masks    Eyes:      General: Lids are normal. No scleral icterus. Right eye: No discharge. Left eye: No discharge. Extraocular Movements: Extraocular movements intact. Conjunctiva/sclera: Conjunctivae normal.   Neck:      Thyroid: No thyromegaly. Cardiovascular:      Rate and Rhythm: Normal rate. Rhythm regularly irregular. Heart sounds: Normal heart sounds.  No murmur heard.      Pulmonary:      Effort: Pulmonary effort is normal. No respiratory distress. Breath sounds: Normal breath sounds. No wheezing or rales. Chest:      Chest wall: No tenderness. Abdominal:      General: Bowel sounds are normal. There is no distension. Palpations: Abdomen is soft. There is no hepatomegaly or splenomegaly. Tenderness: There is no abdominal tenderness. Musculoskeletal:         General: Deformity present. No tenderness. Cervical back: Normal range of motion and neck supple. Right lower le+ Pitting Edema present. Left lower le+ Pitting Edema present. Comments: Thoracic kyphosis   Skin:     General: Skin is warm and dry. Capillary Refill: Capillary refill takes less than 2 seconds. Findings: Erythema and rash present. Comments: erythematous rash bilateral legs , slightly warm, with multiple clusters of small blisters, some are open on the lateral sides of the legs, on the left leg worse, has 2 gauzes on the left leg wet, and one gauze on the right leg. No real wound noted. Mild erythematous rash under the breast consistent with intertrigo, no open areas noted. Neurological:      Mental Status: She is alert and oriented to person, place, and time. Cranial Nerves: No cranial nerve deficit. Motor: No abnormal muscle tone. Gait: Gait abnormal.      Comments: Ambulates with walker   Psychiatric:         Mood and Affect: Mood is anxious. Behavior: Behavior normal.         Thought Content: Thought content normal.         Judgment: Judgment normal.             I personally reviewed testing with patient.   Mild anemia, likely anemia of chronic disease  Chronic kidney disease stage III worsening  Increased alkaline phosphatase  Mild hyperglycemia well controlled diabetes    Otherwise labs within normal limits    Lab Results   Component Value Date    WBC 9.0 2021    HGB 11.0 (L) 2021    HCT 37.5 2021 MCV 99.7 12/06/2021     12/06/2021       Lab Results   Component Value Date     12/06/2021    K 4.8 12/06/2021     12/06/2021    CO2 23 12/06/2021    BUN 64 12/06/2021    CREATININE 1.48 12/06/2021    GLUCOSE 116 12/06/2021    CALCIUM 9.4 12/06/2021        Lab Results   Component Value Date    ALT 17 12/06/2021    AST 29 12/06/2021    ALKPHOS 167 (H) 12/06/2021    BILITOT 0.24 (L) 12/06/2021       Lab Results   Component Value Date    TSH 2.60 12/06/2021         Lab Results   Component Value Date    LABA1C 5.8 11/24/2021     Lab Results   Component Value Date    FOLATE >20.0 03/18/2021             Orders Placed This Encounter   Procedures    Brain Natriuretic Peptide     Standing Status:   Future     Standing Expiration Date:   1/7/2023    Magnesium     Standing Status:   Future     Standing Expiration Date:   1/7/2023    CBC     Standing Status:   Future     Standing Expiration Date:   1/7/2023    Comprehensive Metabolic Panel     Standing Status:   Future     Standing Expiration Date:   3/6/2022    Phosphorus     Standing Status:   Future     Standing Expiration Date:   1/7/2023       Orders Placed This Encounter   Medications    miconazole (MICOTIN) 2 % powder     Sig: Apply topically 2 times daily UNDER THE SKIN FOLDS LONG TERM     Dispense:  45 g     Refill:  1    furosemide (LASIX) 40 MG tablet     Sig: Take 1 tablet by mouth daily Dose increased 1/7/2022     Dispense:  90 tablet     Refill:  2    mupirocin (BACTROBAN) 2 % cream     Sig: Apply 2 times dailY X 10 DAYS ON OPEN BLISTERS ON THE LEGS.      Dispense:  30 g     Refill:  3    chlorhexidine (HIBICLENS) 4 % external liquid     Sig: for decontamination AND WASH LEGS EVERY DAY     Dispense:  946 mL     Refill:  2    doxycycline hyclate (VIBRA-TABS) 100 MG tablet     Sig: Take 1 tablet by mouth 2 times daily for 10 days WITH FOOD, FOR CELLULITIS     Dispense:  20 tablet     Refill:  0       Medications Discontinued During

## 2022-01-09 PROBLEM — Z28.21 COVID-19 VACCINATION DECLINED: Status: ACTIVE | Noted: 2022-01-09

## 2022-01-14 ENCOUNTER — HOSPITAL ENCOUNTER (OUTPATIENT)
Age: 72
Setting detail: SPECIMEN
Discharge: HOME | End: 2022-01-14

## 2022-01-14 DIAGNOSIS — I12.9 BENIGN HYPERTENSION WITH CKD (CHRONIC KIDNEY DISEASE) STAGE III (HCC): ICD-10-CM

## 2022-01-14 DIAGNOSIS — N18.30 BENIGN HYPERTENSION WITH CKD (CHRONIC KIDNEY DISEASE) STAGE III (HCC): ICD-10-CM

## 2022-01-14 DIAGNOSIS — I50.33 ACUTE ON CHRONIC DIASTOLIC CONGESTIVE HEART FAILURE (HCC): ICD-10-CM

## 2022-01-14 LAB
ALBUMIN SERPL-MCNC: 3.7 G/DL (ref 3.5–5.2)
ALBUMIN/GLOBULIN RATIO: 1.2 (ref 1–2.5)
ALP BLD-CCNC: 208 U/L (ref 35–104)
ALT SERPL-CCNC: 9 U/L (ref 5–33)
ANION GAP SERPL CALCULATED.3IONS-SCNC: 18 MMOL/L (ref 9–17)
AST SERPL-CCNC: <5 U/L
BILIRUB SERPL-MCNC: 0.21 MG/DL (ref 0.3–1.2)
BNP INTERPRETATION: ABNORMAL
BUN BLDV-MCNC: 95 MG/DL (ref 8–23)
BUN/CREAT BLD: ABNORMAL (ref 9–20)
CALCIUM SERPL-MCNC: 9.3 MG/DL (ref 8.6–10.4)
CHLORIDE BLD-SCNC: 101 MMOL/L (ref 98–107)
CO2: 20 MMOL/L (ref 20–31)
CREAT SERPL-MCNC: 1.9 MG/DL (ref 0.5–0.9)
GFR AFRICAN AMERICAN: 31 ML/MIN
GFR NON-AFRICAN AMERICAN: 26 ML/MIN
GFR SERPL CREATININE-BSD FRML MDRD: ABNORMAL ML/MIN/{1.73_M2}
GFR SERPL CREATININE-BSD FRML MDRD: ABNORMAL ML/MIN/{1.73_M2}
GLUCOSE BLD-MCNC: 123 MG/DL (ref 70–99)
HCT VFR BLD CALC: 35.7 % (ref 36.3–47.1)
HEMOGLOBIN: 10.7 G/DL (ref 11.9–15.1)
MAGNESIUM: 2.1 MG/DL (ref 1.6–2.6)
MCH RBC QN AUTO: 28.9 PG (ref 25.2–33.5)
MCHC RBC AUTO-ENTMCNC: 30 G/DL (ref 28.4–34.8)
MCV RBC AUTO: 96.5 FL (ref 82.6–102.9)
NRBC AUTOMATED: 0 PER 100 WBC
PDW BLD-RTO: 15.4 % (ref 11.8–14.4)
PHOSPHORUS: 5.1 MG/DL (ref 2.6–4.5)
PLATELET # BLD: 315 K/UL (ref 138–453)
PMV BLD AUTO: 10.9 FL (ref 8.1–13.5)
POTASSIUM SERPL-SCNC: 5 MMOL/L (ref 3.7–5.3)
PRO-BNP: 1361 PG/ML
RBC # BLD: 3.7 M/UL (ref 3.95–5.11)
SODIUM BLD-SCNC: 139 MMOL/L (ref 135–144)
TOTAL PROTEIN: 6.7 G/DL (ref 6.4–8.3)
WBC # BLD: 10.6 K/UL (ref 3.5–11.3)

## 2022-01-15 DIAGNOSIS — I50.32 CHRONIC DIASTOLIC CHF (CONGESTIVE HEART FAILURE) (HCC): ICD-10-CM

## 2022-01-15 DIAGNOSIS — N17.9 AKI (ACUTE KIDNEY INJURY) (HCC): Primary | ICD-10-CM

## 2022-01-15 DIAGNOSIS — N18.30 BENIGN HYPERTENSION WITH CKD (CHRONIC KIDNEY DISEASE) STAGE III (HCC): ICD-10-CM

## 2022-01-15 DIAGNOSIS — I12.9 BENIGN HYPERTENSION WITH CKD (CHRONIC KIDNEY DISEASE) STAGE III (HCC): ICD-10-CM

## 2022-01-15 NOTE — RESULT ENCOUNTER NOTE
Please call pharmacy only to cancel losartan 25 mg and spironolactone 25 mg refills    FYI  I SPOKE with patient due to acute on chronic kidney disease to stop  losartan 25 mg and spironolactone 25 mg  Repeat blood work Monday BMP. Orders will be in the computer, advised to go to [facility]  Patient did have a very hard time hearing, but her son was there and took the  instructions and wrote down the instructions, he helped me communicate with the patient.   Patient says she feels \"Much better, redness went down almost all, almost no seeping, doing really good\"  Son verbalized understanding, and repeated back to her mom      Future Appointments  5/4/2022   3:30 PM    MD mary Nayak  11/25/2022 3:30 PM    MD mary Nayak sc               [facility]

## 2022-01-16 ENCOUNTER — TELEPHONE (OUTPATIENT)
Dept: FAMILY MEDICINE CLINIC | Age: 72
End: 2022-01-16

## 2022-01-17 ENCOUNTER — HOSPITAL ENCOUNTER (OUTPATIENT)
Age: 72
Discharge: HOME | End: 2022-01-17
Payer: MEDICARE

## 2022-01-17 ENCOUNTER — CLINICAL DOCUMENTATION (OUTPATIENT)
Dept: SPIRITUAL SERVICES | Age: 72
End: 2022-01-17

## 2022-01-17 DIAGNOSIS — I12.9 BENIGN HYPERTENSION WITH CKD (CHRONIC KIDNEY DISEASE) STAGE III (HCC): ICD-10-CM

## 2022-01-17 DIAGNOSIS — N18.30 BENIGN HYPERTENSION WITH CKD (CHRONIC KIDNEY DISEASE) STAGE III (HCC): ICD-10-CM

## 2022-01-17 DIAGNOSIS — I50.32 CHRONIC DIASTOLIC CHF (CONGESTIVE HEART FAILURE) (HCC): ICD-10-CM

## 2022-01-17 DIAGNOSIS — N17.9 AKI (ACUTE KIDNEY INJURY) (HCC): ICD-10-CM

## 2022-01-17 DIAGNOSIS — B37.2 CANDIDAL INTERTRIGO: ICD-10-CM

## 2022-01-17 DIAGNOSIS — N17.9 AKI (ACUTE KIDNEY INJURY) (HCC): Primary | ICD-10-CM

## 2022-01-17 LAB
ANION GAP SERPL CALCULATED.3IONS-SCNC: 17 MMOL/L (ref 9–17)
BNP INTERPRETATION: ABNORMAL
BUN BLDV-MCNC: 95 MG/DL (ref 8–23)
BUN/CREAT BLD: ABNORMAL (ref 9–20)
CALCIUM SERPL-MCNC: 9.7 MG/DL (ref 8.6–10.4)
CHLORIDE BLD-SCNC: 102 MMOL/L (ref 98–107)
CO2: 22 MMOL/L (ref 20–31)
CREAT SERPL-MCNC: 1.71 MG/DL (ref 0.5–0.9)
GFR AFRICAN AMERICAN: 36 ML/MIN
GFR NON-AFRICAN AMERICAN: 29 ML/MIN
GFR SERPL CREATININE-BSD FRML MDRD: ABNORMAL ML/MIN/{1.73_M2}
GFR SERPL CREATININE-BSD FRML MDRD: ABNORMAL ML/MIN/{1.73_M2}
GLUCOSE BLD-MCNC: 140 MG/DL (ref 70–99)
MAGNESIUM: 2 MG/DL (ref 1.6–2.6)
PHOSPHORUS: 5.2 MG/DL (ref 2.6–4.5)
POTASSIUM SERPL-SCNC: 4.1 MMOL/L (ref 3.7–5.3)
PRO-BNP: 2270 PG/ML
SODIUM BLD-SCNC: 141 MMOL/L (ref 135–144)

## 2022-01-17 PROCEDURE — 84100 ASSAY OF PHOSPHORUS: CPT

## 2022-01-17 PROCEDURE — 83735 ASSAY OF MAGNESIUM: CPT

## 2022-01-17 PROCEDURE — 80048 BASIC METABOLIC PNL TOTAL CA: CPT

## 2022-01-17 PROCEDURE — 83880 ASSAY OF NATRIURETIC PEPTIDE: CPT

## 2022-01-17 PROCEDURE — 36415 COLL VENOUS BLD VENIPUNCTURE: CPT

## 2022-01-17 NOTE — RESULT ENCOUNTER NOTE
Noted  Future Appointments  5/4/2022   3:30 PM    Adina Rose MD     [facility]  11/25/2022 3:30 PM    Adina Rose MD     [facility]

## 2022-01-17 NOTE — RESULT ENCOUNTER NOTE
Please notify patient's son or daughter, patient is very hard of hearing, and to ask how is she feeling? If at baseline continue the same and repeat blood work in 2 days. Kidney function is still abnormal but slightly improved from 3 days ago which is a good sign  I stopped losartan and Aldactone over the weekend  If she still feels at baseline, to continue the same and recheck blood work in 2 days, to go to [facility] to have it done at the outpatient lab.     Also to see Dr. Theresa Dowd her nephrologist please give her contact information    Otherwise labs within normal limits  continue current treatment    Future Appointments  5/4/2022   3:30 PM    Adina Rose MD     [facility]  11/25/2022 3:30 PM    Adina Rose MD     [facility]

## 2022-01-17 NOTE — ACP (ADVANCE CARE PLANNING)
Advance Care Planning   Ambulatory ACP Specialist Patient Outreach    Date:  1/17/2022  ACP Specialist:  Kendall Edwards    Outreach call to patient in follow-up to ACP Specialist referral from: Guerita Tellez MD    [x] PCP  [] Provider   [] Ambulatory Care Management [] Other for Reason:    [x] Advance Directive Assistance  [] Code Status Discussion  [] Complete Portable DNR Order  [] Discuss Goals of Care  [] Complete POST/MOST  [] Early ACP Decision-Making  [] Other    Date Referral Received:11/24/21    Today's Outreach:  [x] First   [] Second  [] Third                               First outreach made by [x]  phone  [] email [x]   Vocation     Intervention:  [] Spoke with Patient  [x] Left VM requesting return call      Outcome: Left detailed VM for patient to call back. [place]. Patient does not have Email on file. Next Step:   [] ACP scheduled conversation  [x] Outreach again in two week               [] Email / Mail ACP Info Sheets  [] Email / Mail Advance Directive            [] Close Referral. Routing closure to referring provider/staff and to ACP Specialist .      Thank you for this referral.

## 2022-01-19 ENCOUNTER — TELEPHONE (OUTPATIENT)
Dept: FAMILY MEDICINE CLINIC | Age: 72
End: 2022-01-19

## 2022-01-19 ENCOUNTER — HOSPITAL ENCOUNTER (OUTPATIENT)
Age: 72
Discharge: HOME | End: 2022-01-19
Payer: MEDICARE

## 2022-01-19 DIAGNOSIS — I12.9 BENIGN HYPERTENSION WITH CKD (CHRONIC KIDNEY DISEASE) STAGE III (HCC): ICD-10-CM

## 2022-01-19 DIAGNOSIS — N18.30 BENIGN HYPERTENSION WITH CKD (CHRONIC KIDNEY DISEASE) STAGE III (HCC): ICD-10-CM

## 2022-01-19 DIAGNOSIS — N17.9 AKI (ACUTE KIDNEY INJURY) (HCC): Primary | ICD-10-CM

## 2022-01-19 DIAGNOSIS — I50.32 CHRONIC DIASTOLIC CHF (CONGESTIVE HEART FAILURE) (HCC): ICD-10-CM

## 2022-01-19 DIAGNOSIS — N17.9 AKI (ACUTE KIDNEY INJURY) (HCC): ICD-10-CM

## 2022-01-19 LAB
ANION GAP SERPL CALCULATED.3IONS-SCNC: 15 MMOL/L (ref 9–17)
BUN BLDV-MCNC: 98 MG/DL (ref 8–23)
BUN/CREAT BLD: ABNORMAL (ref 9–20)
CALCIUM SERPL-MCNC: 9.3 MG/DL (ref 8.6–10.4)
CHLORIDE BLD-SCNC: 103 MMOL/L (ref 98–107)
CO2: 22 MMOL/L (ref 20–31)
CREAT SERPL-MCNC: 1.66 MG/DL (ref 0.5–0.9)
GFR AFRICAN AMERICAN: 37 ML/MIN
GFR NON-AFRICAN AMERICAN: 30 ML/MIN
GFR SERPL CREATININE-BSD FRML MDRD: ABNORMAL ML/MIN/{1.73_M2}
GFR SERPL CREATININE-BSD FRML MDRD: ABNORMAL ML/MIN/{1.73_M2}
GLUCOSE BLD-MCNC: 130 MG/DL (ref 70–99)
MAGNESIUM: 2.1 MG/DL (ref 1.6–2.6)
PHOSPHORUS: 4.9 MG/DL (ref 2.6–4.5)
POTASSIUM SERPL-SCNC: 4.2 MMOL/L (ref 3.7–5.3)
SODIUM BLD-SCNC: 140 MMOL/L (ref 135–144)

## 2022-01-19 PROCEDURE — 36415 COLL VENOUS BLD VENIPUNCTURE: CPT

## 2022-01-19 PROCEDURE — 83735 ASSAY OF MAGNESIUM: CPT

## 2022-01-19 PROCEDURE — 84100 ASSAY OF PHOSPHORUS: CPT

## 2022-01-19 PROCEDURE — 80048 BASIC METABOLIC PNL TOTAL CA: CPT

## 2022-01-19 NOTE — TELEPHONE ENCOUNTER
Addressed  Dr. Abran Friedman informed me via [facility] that he will see her earlier and to stop furosemide altogether      ----- Message from [facility] sent at 1/19/2022  4:26 PM EST -----  Subject: Medication Problem     QUESTIONS  Name of Medication? furosemide (LASIX) 40 MG tablet  Patient-reported dosage and instructions? Take 1 tablet by mouth daily   Dose increased 1/7/2022  What question or problem do you have with the medication?  Patient stated   her kidney doctor just informed her to stop taking this medication all   together and patient wanted Dr. Michelle Muro to know    Future Appointments   Date Time Provider Angela Reyez   1/25/2022 11:45 AM MD Elysia [facility]   5/4/2022  3:30 PM Awa Frye MD [facility]   11/25/2022  3:30 PM Awa Frye MD fp sc [facility]

## 2022-01-19 NOTE — TELEPHONE ENCOUNTER
,    Please let me know what else I can do to improve her BUN?    CATHY. I perfect served Dr. Flores Shannon regarding persistent BUN increased, a critical value at 98, previously 95, before that 95 for the past 5 days,  GFR improving at 30 from prior of 25, with GFR at baseline is 53. We have stopped Aldactone and losartan 5 days ago. I have seen her on 1/7/2022 due to severe cellulitis of the legs with seeping from her legs and we had to increase the furosemide    Currently the advice was to cut down the furosemide from 40 mg back to 20 mg for 3 days, repeat the blood work in 2 days. Lab Results   Component Value Date     01/19/2022    K 4.2 01/19/2022     01/19/2022    CO2 22 01/19/2022    BUN 98 01/19/2022    CREATININE 1.66 01/19/2022    GLUCOSE 130 01/19/2022    CALCIUM 9.3 01/19/2022         Diagnosis Orders   1. JOY (acute kidney injury) ([address]  Basic Metabolic Panel    Magnesium    Phosphorus   2. Chronic diastolic CHF (congestive heart failure) ([facility])  Basic Metabolic Panel    Magnesium    Phosphorus    Brain Natriuretic Peptide   3.  Benign hypertension with CKD (chronic kidney disease) stage III [facility])  Basic Metabolic Panel    Magnesium    Phosphorus      Future Appointments   Date Time Provider Angela Reyez   4/8/2022  3:15 PM Sana Minaya MD [facility]   5/4/2022  3:30 PM Sue Waddell MD fp sc [facility]   11/25/2022  3:30 PM Sue Waddell MD fp shaw [facility]

## 2022-01-19 NOTE — TELEPHONE ENCOUNTER
.  Medications Discontinued During This Encounter   Medication Reason    furosemide (LASIX) 40 MG tablet Stop Taking at Discharge

## 2022-01-19 NOTE — RESULT ENCOUNTER NOTE
Please notify patient: Still critical BUN function however the kidney function is slowly improving.   I suggest her to contact Dr. Parker Mckeon will also PerfectServe Dr. Cecilio Pan right now let him know to look at her labs to see if anything else we can improve    To cut down furosemide to 20 mg for 3 days  To repeat her blood work in 2 days new orders in the computer      Future Appointments  4/8/2022   3:15 PM    MD Elysia   [facility]        [facility]  5/4/2022   3:30 PM    Chelly Harrison MD     [facility]  11/25/2022 3:30 PM    Chelly Harrison MD     [facility]

## 2022-01-19 NOTE — TELEPHONE ENCOUNTER
----- Message from [facility] sent at 1/19/2022  4:26 PM EST -----  Subject: Medication Problem    QUESTIONS  Name of Medication? furosemide (LASIX) 40 MG tablet  Patient-reported dosage and instructions? Take 1 tablet by mouth daily   Dose increased 1/7/2022  What question or problem do you have with the medication? Patient stated   her kidney doctor just informed her to stop taking this medication all   together and patient wanted Dr. Geoff Hays to know  Preferred Pharmacy? [address] [phone]  Pharmacy phone number (if available)? [phone]  Additional Information for Provider?   ---------------------------------------------------------------------------  --------------  [address]  What is the best way for the office to contact you? OK to leave message on   voicemail  Preferred Call Back Phone Number? [phone]  ---------------------------------------------------------------------------  --------------  SCRIPT ANSWERS  Relationship to Patient?  Self

## 2022-01-21 ENCOUNTER — HOSPITAL ENCOUNTER (OUTPATIENT)
Age: 72
Discharge: HOME | End: 2022-01-21
Payer: MEDICARE

## 2022-01-21 LAB
ANION GAP SERPL CALCULATED.3IONS-SCNC: 13 MMOL/L (ref 9–17)
BNP INTERPRETATION: ABNORMAL
BUN BLDV-MCNC: 87 MG/DL (ref 8–23)
BUN/CREAT BLD: ABNORMAL (ref 9–20)
CALCIUM SERPL-MCNC: 9.2 MG/DL (ref 8.6–10.4)
CHLORIDE BLD-SCNC: 104 MMOL/L (ref 98–107)
CO2: 23 MMOL/L (ref 20–31)
CREAT SERPL-MCNC: 1.68 MG/DL (ref 0.5–0.9)
GFR AFRICAN AMERICAN: 36 ML/MIN
GFR NON-AFRICAN AMERICAN: 30 ML/MIN
GFR SERPL CREATININE-BSD FRML MDRD: ABNORMAL ML/MIN/{1.73_M2}
GFR SERPL CREATININE-BSD FRML MDRD: ABNORMAL ML/MIN/{1.73_M2}
GLUCOSE BLD-MCNC: 131 MG/DL (ref 70–99)
MAGNESIUM: 2.3 MG/DL (ref 1.6–2.6)
PHOSPHORUS: 4.5 MG/DL (ref 2.6–4.5)
POTASSIUM SERPL-SCNC: 4.1 MMOL/L (ref 3.7–5.3)
PRO-BNP: 2467 PG/ML
SODIUM BLD-SCNC: 140 MMOL/L (ref 135–144)

## 2022-01-21 PROCEDURE — 84100 ASSAY OF PHOSPHORUS: CPT

## 2022-01-21 PROCEDURE — 83880 ASSAY OF NATRIURETIC PEPTIDE: CPT

## 2022-01-21 PROCEDURE — 80048 BASIC METABOLIC PNL TOTAL CA: CPT

## 2022-01-21 PROCEDURE — 36415 COLL VENOUS BLD VENIPUNCTURE: CPT

## 2022-01-21 PROCEDURE — 83735 ASSAY OF MAGNESIUM: CPT

## 2022-01-21 NOTE — RESULT ENCOUNTER NOTE
Please notify patient: Improving kidney function, continue current medications and follow-up with nephrologist, and with the list of current medications:   We have stopped Aldactone, losartan, and furosemide      Future Appointments  1/25/2022  11:45 AM   Priyanka Neri MD   [facility]        [facility]  5/4/2022   3:30 PM    Maya Johnson MD     fp sc               [facility]  11/25/2022 3:30 PM    Maya Johnson MD     fp sc               Jenean Halsted

## 2022-02-02 ENCOUNTER — TELEPHONE (OUTPATIENT)
Dept: FAMILY MEDICINE CLINIC | Age: 72
End: 2022-02-02

## 2022-02-02 ENCOUNTER — NURSE TRIAGE (OUTPATIENT)
Dept: OTHER | Facility: CLINIC | Age: 72
End: 2022-02-02

## 2022-02-02 NOTE — TELEPHONE ENCOUNTER
Received call from Josefina John at [facility] with [employer]. Subjective: Caller states \"my brother said she is having panic attacks. I called her and she said she was a little out of breath and that she was having panic attacks for about a week. \"     Current Symptoms: per daughter, patient has had episodes of shortness of breath and feeling like her heart is racing, usually lasting about 5 minutes. Told daughter she would not go to the hospital - would only go to the doctor's office. Limited triage due to patient not with caller. Onset: 1 week ago; intermittent    Associated Symptoms: NA    Pain Severity: no known complaints of pain    Temperature: no fever     What has been tried: calming herself down and sitting down    LMP: NA Pregnant: NA    Recommended disposition: be seen within 3 days. Call back or go to ER or call 911 if anything gets worse. Also recommended that patient or daughter call back if she has another panic attack this afternoon so we can do another assessment. Call 911 or go to ER for any severe shortness of breath, symptoms lasting longer than they have been, or any chest pain. Care advice provided, patient verbalizes understanding; denies any other questions or concerns; instructed to call back for any new or worsening symptoms. Writer provided warm transfer to Angelica Causey at [facility] for appointment scheduling     Attention Provider: Thank you for allowing me to participate in the care of your patient. The patient was connected to triage in response to information provided to the ECC/PSC. Please do not respond through this encounter as the response is not directed to a shared pool.         Reason for Disposition   Symptoms of anxiety or panic and has not been evaluated for this by physician    Protocols used: ANXIETY AND PANIC ATTACK-ADULT-[place]

## 2022-02-03 NOTE — TELEPHONE ENCOUNTER
Please schedule the patient for Friday, patient was already advised to go to the emergency room if worsening symptoms especially shortness of breath

## 2022-02-06 ENCOUNTER — HOSPITAL ENCOUNTER (INPATIENT)
Age: 72
LOS: 5 days | Discharge: NURSING, REHAB OR LONG-TERM CARE | DRG: 291 | End: 2022-02-11
Attending: EMERGENCY MEDICINE | Admitting: INTERNAL MEDICINE
Payer: MEDICARE

## 2022-02-06 ENCOUNTER — APPOINTMENT (OUTPATIENT)
Dept: GENERAL RADIOLOGY | Age: 72
DRG: 291 | End: 2022-02-06
Payer: MEDICARE

## 2022-02-06 DIAGNOSIS — L03.119 CELLULITIS OF LOWER EXTREMITY, UNSPECIFIED LATERALITY: ICD-10-CM

## 2022-02-06 DIAGNOSIS — I50.43 ACUTE ON CHRONIC COMBINED SYSTOLIC (CONGESTIVE) AND DIASTOLIC (CONGESTIVE) HEART FAILURE (HCC): Primary | ICD-10-CM

## 2022-02-06 DIAGNOSIS — R09.02 HYPOXIA: ICD-10-CM

## 2022-02-06 DIAGNOSIS — N17.9 ACUTE KIDNEY INJURY (HCC): ICD-10-CM

## 2022-02-06 PROBLEM — I48.20 CHRONIC A-FIB (HCC): Status: ACTIVE | Noted: 2022-02-06

## 2022-02-06 LAB
ABSOLUTE EOS #: 0.1 K/UL (ref 0–0.4)
ABSOLUTE IMMATURE GRANULOCYTE: ABNORMAL K/UL (ref 0–0.3)
ABSOLUTE LYMPH #: 0.9 K/UL (ref 1–4.8)
ABSOLUTE MONO #: 0.5 K/UL (ref 0.1–1.3)
ALBUMIN SERPL-MCNC: 3.3 G/DL (ref 3.5–5.2)
ALBUMIN/GLOBULIN RATIO: ABNORMAL (ref 1–2.5)
ALP BLD-CCNC: 230 U/L (ref 35–104)
ALT SERPL-CCNC: 22 U/L (ref 5–33)
ANION GAP SERPL CALCULATED.3IONS-SCNC: 11 MMOL/L (ref 9–17)
AST SERPL-CCNC: 32 U/L
BASOPHILS # BLD: 1 % (ref 0–2)
BASOPHILS ABSOLUTE: 0 K/UL (ref 0–0.2)
BILIRUB SERPL-MCNC: 0.29 MG/DL (ref 0.3–1.2)
BNP INTERPRETATION: ABNORMAL
BUN BLDV-MCNC: 46 MG/DL (ref 8–23)
BUN/CREAT BLD: ABNORMAL (ref 9–20)
CALCIUM SERPL-MCNC: 9.3 MG/DL (ref 8.6–10.4)
CHLORIDE BLD-SCNC: 103 MMOL/L (ref 98–107)
CO2: 27 MMOL/L (ref 20–31)
CREAT SERPL-MCNC: 1.47 MG/DL (ref 0.5–0.9)
DIFFERENTIAL TYPE: ABNORMAL
EOSINOPHILS RELATIVE PERCENT: 2 % (ref 0–4)
GFR AFRICAN AMERICAN: 42 ML/MIN
GFR NON-AFRICAN AMERICAN: 35 ML/MIN
GFR SERPL CREATININE-BSD FRML MDRD: ABNORMAL ML/MIN/{1.73_M2}
GFR SERPL CREATININE-BSD FRML MDRD: ABNORMAL ML/MIN/{1.73_M2}
GLUCOSE BLD-MCNC: 149 MG/DL (ref 70–99)
HCT VFR BLD CALC: 34.8 % (ref 36–46)
HEMOGLOBIN: 11 G/DL (ref 12–16)
IMMATURE GRANULOCYTES: ABNORMAL %
INFLUENZA A: NOT DETECTED
INFLUENZA B: NOT DETECTED
INR BLD: 1.3
LIPASE: 17 U/L (ref 13–60)
LYMPHOCYTES # BLD: 12 % (ref 24–44)
MCH RBC QN AUTO: 28.4 PG (ref 26–34)
MCHC RBC AUTO-ENTMCNC: 31.8 G/DL (ref 31–37)
MCV RBC AUTO: 89.5 FL (ref 80–100)
MONOCYTES # BLD: 7 % (ref 1–7)
NRBC AUTOMATED: ABNORMAL PER 100 WBC
PARTIAL THROMBOPLASTIN TIME: 34.9 SEC (ref 24–36)
PDW BLD-RTO: 16.5 % (ref 11.5–14.9)
PLATELET # BLD: 286 K/UL (ref 150–450)
PLATELET ESTIMATE: ABNORMAL
PMV BLD AUTO: 7.5 FL (ref 6–12)
POTASSIUM SERPL-SCNC: 4.6 MMOL/L (ref 3.7–5.3)
PRO-BNP: 3726 PG/ML
PROTHROMBIN TIME: 16 SEC (ref 11.8–14.6)
RBC # BLD: 3.88 M/UL (ref 4–5.2)
RBC # BLD: ABNORMAL 10*6/UL
SARS-COV-2 RNA, RT PCR: NOT DETECTED
SEG NEUTROPHILS: 78 % (ref 36–66)
SEGMENTED NEUTROPHILS ABSOLUTE COUNT: 5.6 K/UL (ref 1.3–9.1)
SODIUM BLD-SCNC: 141 MMOL/L (ref 135–144)
SOURCE: NORMAL
SPECIMEN DESCRIPTION: NORMAL
TOTAL PROTEIN: 6.3 G/DL (ref 6.4–8.3)
TROPONIN INTERP: ABNORMAL
TROPONIN T: ABNORMAL NG/ML
TROPONIN, HIGH SENSITIVITY: 34 NG/L (ref 0–14)
WBC # BLD: 7.2 K/UL (ref 3.5–11)
WBC # BLD: ABNORMAL 10*3/UL

## 2022-02-06 PROCEDURE — 6370000000 HC RX 637 (ALT 250 FOR IP): Performed by: INTERNAL MEDICINE

## 2022-02-06 PROCEDURE — 83690 ASSAY OF LIPASE: CPT

## 2022-02-06 PROCEDURE — 6360000002 HC RX W HCPCS: Performed by: STUDENT IN AN ORGANIZED HEALTH CARE EDUCATION/TRAINING PROGRAM

## 2022-02-06 PROCEDURE — 85025 COMPLETE CBC W/AUTO DIFF WBC: CPT

## 2022-02-06 PROCEDURE — 87636 SARSCOV2 & INF A&B AMP PRB: CPT

## 2022-02-06 PROCEDURE — 6370000000 HC RX 637 (ALT 250 FOR IP)

## 2022-02-06 PROCEDURE — 2500000003 HC RX 250 WO HCPCS: Performed by: INTERNAL MEDICINE

## 2022-02-06 PROCEDURE — 83880 ASSAY OF NATRIURETIC PEPTIDE: CPT

## 2022-02-06 PROCEDURE — 6370000000 HC RX 637 (ALT 250 FOR IP): Performed by: STUDENT IN AN ORGANIZED HEALTH CARE EDUCATION/TRAINING PROGRAM

## 2022-02-06 PROCEDURE — 85610 PROTHROMBIN TIME: CPT

## 2022-02-06 PROCEDURE — 97530 THERAPEUTIC ACTIVITIES: CPT

## 2022-02-06 PROCEDURE — 2060000000 HC ICU INTERMEDIATE R&B

## 2022-02-06 PROCEDURE — 97161 PT EVAL LOW COMPLEX 20 MIN: CPT

## 2022-02-06 PROCEDURE — 6360000002 HC RX W HCPCS: Performed by: INTERNAL MEDICINE

## 2022-02-06 PROCEDURE — 2580000003 HC RX 258: Performed by: INTERNAL MEDICINE

## 2022-02-06 PROCEDURE — 99223 1ST HOSP IP/OBS HIGH 75: CPT | Performed by: INTERNAL MEDICINE

## 2022-02-06 PROCEDURE — 93005 ELECTROCARDIOGRAM TRACING: CPT | Performed by: EMERGENCY MEDICINE

## 2022-02-06 PROCEDURE — 85730 THROMBOPLASTIN TIME PARTIAL: CPT

## 2022-02-06 PROCEDURE — 36415 COLL VENOUS BLD VENIPUNCTURE: CPT

## 2022-02-06 PROCEDURE — 71045 X-RAY EXAM CHEST 1 VIEW: CPT

## 2022-02-06 PROCEDURE — 2500000003 HC RX 250 WO HCPCS: Performed by: EMERGENCY MEDICINE

## 2022-02-06 PROCEDURE — 84484 ASSAY OF TROPONIN QUANT: CPT

## 2022-02-06 PROCEDURE — 80053 COMPREHEN METABOLIC PANEL: CPT

## 2022-02-06 PROCEDURE — 99284 EMERGENCY DEPT VISIT MOD MDM: CPT

## 2022-02-06 RX ORDER — SODIUM CHLORIDE 9 MG/ML
25 INJECTION, SOLUTION INTRAVENOUS PRN
Status: DISCONTINUED | OUTPATIENT
Start: 2022-02-06 | End: 2022-02-11 | Stop reason: HOSPADM

## 2022-02-06 RX ORDER — SODIUM CHLORIDE 0.9 % (FLUSH) 0.9 %
5-40 SYRINGE (ML) INJECTION EVERY 12 HOURS SCHEDULED
Status: DISCONTINUED | OUTPATIENT
Start: 2022-02-06 | End: 2022-02-11 | Stop reason: HOSPADM

## 2022-02-06 RX ORDER — SODIUM CHLORIDE 0.9 % (FLUSH) 0.9 %
5-40 SYRINGE (ML) INJECTION PRN
Status: DISCONTINUED | OUTPATIENT
Start: 2022-02-06 | End: 2022-02-11 | Stop reason: HOSPADM

## 2022-02-06 RX ORDER — CLOPIDOGREL BISULFATE 75 MG/1
75 TABLET ORAL DAILY
Status: DISCONTINUED | OUTPATIENT
Start: 2022-02-07 | End: 2022-02-11 | Stop reason: HOSPADM

## 2022-02-06 RX ORDER — ALLOPURINOL 300 MG/1
300 TABLET ORAL DAILY
Status: DISCONTINUED | OUTPATIENT
Start: 2022-02-07 | End: 2022-02-11 | Stop reason: HOSPADM

## 2022-02-06 RX ORDER — ONDANSETRON 4 MG/1
4 TABLET, ORALLY DISINTEGRATING ORAL EVERY 8 HOURS PRN
Status: DISCONTINUED | OUTPATIENT
Start: 2022-02-06 | End: 2022-02-11 | Stop reason: HOSPADM

## 2022-02-06 RX ORDER — LOSARTAN POTASSIUM 25 MG/1
25 TABLET ORAL DAILY
Status: DISCONTINUED | OUTPATIENT
Start: 2022-02-07 | End: 2022-02-11 | Stop reason: HOSPADM

## 2022-02-06 RX ORDER — SODIUM BICARBONATE 650 MG/1
650 TABLET ORAL 3 TIMES DAILY
Status: DISCONTINUED | OUTPATIENT
Start: 2022-02-06 | End: 2022-02-10

## 2022-02-06 RX ORDER — SPIRONOLACTONE 25 MG/1
25 TABLET ORAL DAILY
Status: ON HOLD | COMMUNITY
End: 2022-02-06

## 2022-02-06 RX ORDER — METOPROLOL TARTRATE 50 MG/1
50 TABLET, FILM COATED ORAL 2 TIMES DAILY
Status: DISCONTINUED | OUTPATIENT
Start: 2022-02-06 | End: 2022-02-11 | Stop reason: HOSPADM

## 2022-02-06 RX ORDER — DEXTROSE MONOHYDRATE 25 G/50ML
12.5 INJECTION, SOLUTION INTRAVENOUS PRN
Status: DISCONTINUED | OUTPATIENT
Start: 2022-02-06 | End: 2022-02-11 | Stop reason: HOSPADM

## 2022-02-06 RX ORDER — ATORVASTATIN CALCIUM 40 MG/1
40 TABLET, FILM COATED ORAL DAILY
Status: DISCONTINUED | OUTPATIENT
Start: 2022-02-07 | End: 2022-02-11 | Stop reason: HOSPADM

## 2022-02-06 RX ORDER — DIPHENHYDRAMINE HYDROCHLORIDE 50 MG/ML
25 INJECTION INTRAMUSCULAR; INTRAVENOUS NIGHTLY PRN
Status: DISCONTINUED | OUTPATIENT
Start: 2022-02-06 | End: 2022-02-11 | Stop reason: HOSPADM

## 2022-02-06 RX ORDER — POLYETHYLENE GLYCOL 3350 17 G/17G
17 POWDER, FOR SOLUTION ORAL DAILY PRN
Status: DISCONTINUED | OUTPATIENT
Start: 2022-02-06 | End: 2022-02-11 | Stop reason: HOSPADM

## 2022-02-06 RX ORDER — BUMETANIDE 0.25 MG/ML
2 INJECTION, SOLUTION INTRAMUSCULAR; INTRAVENOUS 2 TIMES DAILY
Status: DISCONTINUED | OUTPATIENT
Start: 2022-02-06 | End: 2022-02-08 | Stop reason: RX

## 2022-02-06 RX ORDER — NICOTINE POLACRILEX 4 MG
15 LOZENGE BUCCAL PRN
Status: DISCONTINUED | OUTPATIENT
Start: 2022-02-06 | End: 2022-02-11 | Stop reason: HOSPADM

## 2022-02-06 RX ORDER — BUMETANIDE 1 MG/1
0.5 TABLET ORAL DAILY
Status: DISCONTINUED | OUTPATIENT
Start: 2022-02-07 | End: 2022-02-06

## 2022-02-06 RX ORDER — ALBUTEROL SULFATE 90 UG/1
2 AEROSOL, METERED RESPIRATORY (INHALATION) EVERY 4 HOURS PRN
Status: DISCONTINUED | OUTPATIENT
Start: 2022-02-06 | End: 2022-02-11 | Stop reason: HOSPADM

## 2022-02-06 RX ORDER — DEXTROSE MONOHYDRATE 50 MG/ML
100 INJECTION, SOLUTION INTRAVENOUS PRN
Status: DISCONTINUED | OUTPATIENT
Start: 2022-02-06 | End: 2022-02-11 | Stop reason: HOSPADM

## 2022-02-06 RX ORDER — ACETAMINOPHEN 325 MG/1
650 TABLET ORAL EVERY 6 HOURS PRN
Status: DISCONTINUED | OUTPATIENT
Start: 2022-02-06 | End: 2022-02-11 | Stop reason: HOSPADM

## 2022-02-06 RX ORDER — ACETAMINOPHEN 650 MG/1
650 SUPPOSITORY RECTAL EVERY 6 HOURS PRN
Status: DISCONTINUED | OUTPATIENT
Start: 2022-02-06 | End: 2022-02-11 | Stop reason: HOSPADM

## 2022-02-06 RX ORDER — AMLODIPINE BESYLATE 5 MG/1
5 TABLET ORAL DAILY
Status: DISCONTINUED | OUTPATIENT
Start: 2022-02-07 | End: 2022-02-09

## 2022-02-06 RX ORDER — LISINOPRIL 20 MG/1
40 TABLET ORAL ONCE
Status: COMPLETED | OUTPATIENT
Start: 2022-02-06 | End: 2022-02-06

## 2022-02-06 RX ORDER — ONDANSETRON 2 MG/ML
4 INJECTION INTRAMUSCULAR; INTRAVENOUS EVERY 6 HOURS PRN
Status: DISCONTINUED | OUTPATIENT
Start: 2022-02-06 | End: 2022-02-11 | Stop reason: HOSPADM

## 2022-02-06 RX ORDER — BUMETANIDE 0.25 MG/ML
0.5 INJECTION, SOLUTION INTRAMUSCULAR; INTRAVENOUS ONCE
Status: COMPLETED | OUTPATIENT
Start: 2022-02-06 | End: 2022-02-06

## 2022-02-06 RX ORDER — FUROSEMIDE 10 MG/ML
40 INJECTION INTRAMUSCULAR; INTRAVENOUS 2 TIMES DAILY
Status: DISCONTINUED | OUTPATIENT
Start: 2022-02-06 | End: 2022-02-06

## 2022-02-06 RX ORDER — HYDRALAZINE HYDROCHLORIDE 20 MG/ML
20 INJECTION INTRAMUSCULAR; INTRAVENOUS ONCE
Status: COMPLETED | OUTPATIENT
Start: 2022-02-06 | End: 2022-02-06

## 2022-02-06 RX ADMIN — LISINOPRIL 40 MG: 20 TABLET ORAL at 18:46

## 2022-02-06 RX ADMIN — SODIUM CHLORIDE, PRESERVATIVE FREE 10 ML: 5 INJECTION INTRAVENOUS at 23:41

## 2022-02-06 RX ADMIN — BUMETANIDE 2 MG: 0.25 INJECTION INTRAMUSCULAR; INTRAVENOUS at 23:40

## 2022-02-06 RX ADMIN — BUMETANIDE 0.5 MG: 0.25 INJECTION INTRAMUSCULAR; INTRAVENOUS at 16:10

## 2022-02-06 RX ADMIN — FUROSEMIDE 40 MG: 10 INJECTION, SOLUTION INTRAVENOUS at 18:07

## 2022-02-06 RX ADMIN — DIPHENHYDRAMINE HYDROCHLORIDE 25 MG: 50 INJECTION, SOLUTION INTRAMUSCULAR; INTRAVENOUS at 23:40

## 2022-02-06 RX ADMIN — ACETAMINOPHEN 650 MG: 325 TABLET, FILM COATED ORAL at 14:00

## 2022-02-06 RX ADMIN — SODIUM CHLORIDE, PRESERVATIVE FREE 10 ML: 5 INJECTION INTRAVENOUS at 12:00

## 2022-02-06 RX ADMIN — ENOXAPARIN SODIUM 40 MG: 100 INJECTION SUBCUTANEOUS at 13:46

## 2022-02-06 RX ADMIN — HYDRALAZINE HYDROCHLORIDE 20 MG: 20 INJECTION INTRAMUSCULAR; INTRAVENOUS at 18:09

## 2022-02-06 RX ADMIN — SODIUM BICARBONATE TAB 650 MG 650 MG: 650 TAB at 23:40

## 2022-02-06 RX ADMIN — METOPROLOL TARTRATE 50 MG: 50 TABLET ORAL at 23:40

## 2022-02-06 ASSESSMENT — ENCOUNTER SYMPTOMS
NAUSEA: 0
EYE DISCHARGE: 0
EYE PAIN: 0
SORE THROAT: 0
COUGH: 0
EYE REDNESS: 0
VOMITING: 0
SHORTNESS OF BREATH: 1
ABDOMINAL PAIN: 0
EYE ITCHING: 0
BACK PAIN: 0
RHINORRHEA: 0
WHEEZING: 0
COLOR CHANGE: 0
SINUS PRESSURE: 1

## 2022-02-06 ASSESSMENT — PAIN SCALES - GENERAL
PAINLEVEL_OUTOF10: 3
PAINLEVEL_OUTOF10: 5

## 2022-02-06 NOTE — PROGRESS NOTES
Patient to 2090 per W/C  From ED.  Patient alert and oriented  Ambulated to bathroom upon arrival gait slow, shuffeling

## 2022-02-06 NOTE — CONSULTS
Department of Internal Medicine  Nephrology Dilip Monahan MD   Consult Note    Reason for consultation: Management of edema and chronic kidney disease. Consulting physician: Layla Garcia MD.    History of present illness: This is a 66 y.o. female with a significant past medical history of Failure with reduced ejection fraction [HFrEF with LVEF 45 to 50% in July 2020 secondary to ischemic cardiomyopathy], Coronary artery disease, Meningioma, type 2 diabetes mellitus [with nonproliferative diabetic retinopathy], essential hypertension, bilateral deafness and chronic kidney disease stage III [baseline serum creatinine 1.6 mg/dL and follows up with Dr. Montrell Luevano of [facility] [place] whom she saw 1 week ago and was switched from Lasix to Bumex], presented with complaints of dizziness, increasing shortness of breath and leg swelling. Review of serologic work-up including YRIS, ANCA, complements and protein electrophoresis were essentially normal or negative. Chest x-ray performed at presentation showed pulmonary vascular congestion. She was previously on lisinopril and Aldactone and both are currently being held.  Diuretic regimen most recently consists of Bumex 0.5 mg p.o. daily    Latex, Aleve [naproxen sodium], Pioglitazone, Claritin [loratadine], Keflex [cephalexin], and Lisinopril    Past Medical History:   Diagnosis Date    Acute CVA (cerebrovascular accident) ([facility] [address] 7/25/2020    Altered mental status 5/26/2021    Arthritis     Brain mass     Cellulitis and abscess     Cellulitis and abscess of unspecified site     Cellulitis of both lower extremities 5/26/2021    Cellulitis of left lower extremity 7/26/2020    Cellulitis of lower extremity 10/4/2020    CHF (congestive heart failure) (HCC)     Chronic kidney disease, unspecified     Coronary atherosclerosis of native coronary artery     Essential hypertension 7/25/2020    Essential hypertension, malignant     Hallucinations 2021    Hard of hearing     Head contusion 2020    Hypokalemia 2020    Iron deficiency anemia, unspecified     Meningioma ([facility] [address] 2021    Myocardial infarction (HCC)     Other and unspecified hyperlipidemia     Pure hypercholesterolemia     Toxic metabolic encephalopathy     Type II or unspecified type diabetes mellitus without mention of complication, not stated as uncontrolled     Unspecified asthma(493.90)     Unspecified venous (peripheral) insufficiency     Unspecified vitamin D deficiency     UTI (urinary tract infection) 2021     Scheduled Meds:   sodium chloride flush  5-40 mL IntraVENous 2 times per day    furosemide  40 mg IntraVENous BID    [START ON 2022] amLODIPine  5 mg Oral Daily    [START ON 2022] apixaban  5 mg Oral BID    metoprolol tartrate  50 mg Oral BID    hydrALAZINE  20 mg IntraVENous Once     Continuous Infusions:   sodium chloride       PRN Meds:.sodium chloride flush, sodium chloride, ondansetron **OR** ondansetron, polyethylene glycol, acetaminophen **OR** acetaminophen    Family History   Problem Relation Age of Onset    Diabetes Mother     Heart Disease Mother     Heart Disease Father     Diabetes Sister     High Blood Pressure Sister     Diabetes Maternal Grandmother      Social History     Socioeconomic History    Marital status:       Spouse name: None    Number of children: None    Years of education: None    Highest education level: None   Occupational History    None   Tobacco Use    Smoking status: Former Smoker     Packs/day: 0.25     Years: 10.00     Pack years: 2.50     Quit date: 2000     Years since quittin.1    Smokeless tobacco: Never Used   Vaping Use    Vaping Use: Never used   Substance and Sexual Activity    Alcohol use: Not Currently     Alcohol/week: 0.0 standard drinks    Drug use: No    Sexual activity: None   Other Topics Concern    None   Social History Narrative    None Social Determinants of Health     Financial Resource Strain: Low Risk     Difficulty of Paying Living Expenses: Not very hard   Food Insecurity: No Food Insecurity    Worried About Running Out of Food in the Last Year: Never true    Holden of Food in the Last Year: Never true   Transportation Needs:     Lack of Transportation (Medical): Not on file    Lack of Transportation (Non-Medical): Not on file   Physical Activity:     Days of Exercise per Week: Not on file    Minutes of Exercise per Session: Not on file   Stress:     Feeling of Stress : Not on file   Social Connections:     Frequency of Communication with Friends and Family: Not on file    Frequency of Social Gatherings with Friends and Family: Not on file    Attends [employer] Services: Not on file    Active Member of [address] [place] [address] [employer] or Organizations: Not on file    Attends Club or Organization Meetings: Not on file    Marital Status: Not on file   Intimate Partner Violence:     Fear of Current or Ex-Partner: Not on file    Emotionally Abused: Not on file    Physically Abused: Not on file    Sexually Abused: Not on file   Housing Stability:     Unable to Pay for Housing in the Last Year: Not on file    Number of [place] in the Last Year: Not on file    Unstable Housing in the Last Year: Not on file     Review of systems: CNS - no headache or dizziness; Cardiac - no chest pain; Respiratory - + shortness of breath; Gastrointestinal - No nausea, vomiting or diarrhea; Musculoskeletal - general body aches; Skin/Integument - no rashes.     Physical Exam:    VITALS:  BP (!) 147/49   Pulse 97   Temp 97.7 °F (36.5 °C) (Oral)   Resp 18   Ht 4' 11\" (1.499 m)   Wt 134 lb (60.8 kg)   SpO2 95%   BMI 27.06 kg/m²   24HR INTAKE/OUTPUT:    Intake/Output Summary (Last 24 hours) at 2/6/2022 2059  Last data filed at 2/6/2022 1830  Gross per 24 hour   Intake 420 ml   Output --   Net 420 ml     Constitutional: alert, appears stated age and cooperative    Skin: bilateral leg erythema. Head: Normocephalic, without obvious abnormality, atraumatic     Cardiovascular/Edema: regular rate and rhythm, S1, S2 normal, no murmur, click, rub or gallop    Respiratory: Lungs: rales bibasilar    Abdomen: soft, non-tender; bowel sounds normal; no masses,  no organomegaly    Back: symmetric, no curvature. ROM normal. No CVA tenderness. Extremities: edema with bilateral erythema of the leg    Neuro:  Grossly normal      CBC:   Recent Labs     02/06/22  1011   WBC 7.2   HGB 11.0*        BMP:    Recent Labs     02/06/22  1011      K 4.6      CO2 27   BUN 46*   CREATININE 1.47*   GLUCOSE 149*       Lab Results   Component Value Date    NITRU NEGATIVE 12/06/2021    COLORU Yellow 12/06/2021    PHUR 6.0 12/06/2021    WBCUA 5 TO 10 12/06/2021    RBCUA TOO NUMEROUS TO COUNT 12/06/2021    MUCUS NOT REPORTED 12/06/2021    TRICHOMONAS NOT REPORTED 12/06/2021    YEAST NOT REPORTED 12/06/2021    BACTERIA NOT REPORTED 12/06/2021    SPECGRAV 1.015 12/06/2021    LEUKOCYTESUR NEGATIVE 12/06/2021    UROBILINOGEN Normal 12/06/2021    BILIRUBINUR NEGATIVE 12/06/2021    GLUCOSEU NEGATIVE 12/06/2021    KETUA NEGATIVE 12/06/2021    AMORPHOUS NOT REPORTED 12/06/2021     Urine Protein:     Lab Results   Component Value Date     10/27/2020     Urine Creatinine:     Lab Results   Component Value Date    LABCREA 26.2 05/26/2021     IMPRESSION/RECOMMENDATIONS:      1. Generalized edema - This may be secondary to nephrotic syndrome or decompensated heart failure with reduced ejection fraction. Patient will benefit from aggressive diuretic regimen. I have discussion with patient's 2 daughters who were present during the encounter and advised them that renal function may worsen with aggressive diuresis. She may ultimately require hemodialysis or ultrafiltration of both. Plan: Start IV Bumex 2 mg twice daily. Daily weights. Indwelling Stanford catheter.     2. Nephrotic range proteinuria [urine protein/creatinine ratio 20 g/g] - this may be secondary to diabetic nephropathy but primary glomerular disorders would need to be excluded. Patient has apparently consistently refused kidney biopsy. 3.  Systemic hypertension - blood pressure control is suboptimal at this time. May be related to plasma volume expansion and we will place patient on metoprolol 50 mg p.o. twice daily and monitor blood pressure response to aggressive diuretic regimen. 4.  Bilateral leg cellulitis - Suggest clindamycin. 5.  Chronic kidney disease stage III - most consistent with diabetic glomerulopathy given associated proteinuria and retinopathy. Patient does not have clearly defined angioedema or other complications of ACE inhibitor's. She however states she is allergic on will benefit from addition of angiotensin receptor blocker Losartan 25 mg p.o. daily. Prognosis is guarded. Thank you very much for the courtesy and confidence of this consultation.     Laurie [facility] MD [facility]  Attending Nephrologist  2/6/2022 6:04 PM

## 2022-02-06 NOTE — PROGRESS NOTES
Dr Betty Ledbetter gave order for lisinopril 40 mg po.  When putting order into computer, notification that  patient had allergy to lisinopril

## 2022-02-06 NOTE — CARE COORDINATION
CASE MANAGEMENT NOTE:    Admission Date:  2/6/2022 Edgar Crocker is a 66 y.o.  female    Admitted for : Acute on chronic combined systolic (congestive) and diastolic (congestive) heart failure (HCC) [I50.43]    Met with:  Patient    PCP:  Dr Aram Berg:  medicare      Is patient alert and oriented at time of discussion:  Yes    Current Residence/ Living Arrangements:  independently at home             Current Services PTA:  No    Does patient go to outpatient dialysis: No  If yes, location and chair time:     Is patient agreeable to VNS: No    Freedom of choice provided:  Yes    List of [address] provided: No    VNS chosen:  No    DME:  walker    Home Oxygen: No    Nebulizer: No    CPAP/BIPAP: No    Supplier: N/A    Potential Assistance Needed: No    SNF needed: No    Freedom of choice and list provided: NA    Pharmacy:  [facility]       Does Patient want to use MEDS to BEDS? No    Is patient currently receiving oral anticoagulation therapy? Yes Danny    Is the Patient an [facility] with Readmission Risk Score greater than 14%? No  If yes, pt needs a follow up appointment made within 7 days. Family Members/Caregivers that pt would like involved in their care:    Yes    If yes, list name here:  Daughter Andrew Yung    Transportation Provider:  Family             Discharge Plan:  2/6/22 - Medicare - Patient from home alone, DME: Jc Ponce - refuses. Mariluztamia PTA,  BUN 46, Cre 1.47, BNP 3726. Remains on IV bumex, plan is to return home with no needs. Will follow . //pf              Electronically signed by: Karson Villagomez RN on 2/6/2022 at 2:43 PM

## 2022-02-06 NOTE — PROGRESS NOTES
Physical Therapy    Facility/Department: [facility] PROGRESSIVE CARE  Initial Assessment    NAME: Krista Ricci  : [id number]  MRN: [id number]    Date of Service: 2022    Discharge Recommendations:  Continue to assess pending progress   PT Equipment Recommendations  Equipment Needed: No    Assessment   Body structures, Functions, Activity limitations: Decreased functional mobility ; Decreased balance;Decreased endurance  Assessment: Impaired mobility due to decreased tolerance to activity  Decision Making: Low Complexity  History: CVA  Exam: decreased endurance, balance  Clinical Presentation: stable  REQUIRES PT FOLLOW UP: Yes  Activity Tolerance  Activity Tolerance: Patient limited by endurance       Patient Diagnosis(es): There were no encounter diagnoses. has a past medical history of Acute CVA (cerebrovascular accident) ([address] Altered mental status, Arthritis, Brain mass, Cellulitis and abscess, Cellulitis and abscess of unspecified site, Cellulitis of both lower extremities, Cellulitis of left lower extremity, Cellulitis of lower extremity, CHF (congestive heart failure) ([address] Chronic kidney disease, unspecified, Coronary atherosclerosis of native coronary artery, Essential hypertension, Essential hypertension, malignant, Hallucinations, Hard of hearing, Head contusion, Hypokalemia, Iron deficiency anemia, unspecified, Meningioma ([address] Myocardial infarction ([address] Other and unspecified hyperlipidemia, Pure hypercholesterolemia, Toxic metabolic encephalopathy, Type II or unspecified type diabetes mellitus without mention of complication, not stated as uncontrolled, Unspecified asthma(493.90), Unspecified venous (peripheral) insufficiency, Unspecified vitamin D deficiency, and UTI (urinary tract infection). has a past surgical history that includes Tonsillectomy and adenoidectomy and Coronary artery bypass graft.           Subjective  General  Family / Caregiver Present: Yes  Follows Commands: Within Functional Limits  Subjective  Subjective: pt pleasant and cooperative  Pain Screening  Patient Currently in Pain: Denies  Vital Signs  Patient Currently in Pain: Denies       Orientation  Orientation  Overall Orientation Status: Within Functional Limits  Social/Functional History  Social/Functional History  Lives With: Son  Type of Home: House  Home Layout: Two level,Bed/Bath upstairs,1/2 bath on main level (pt sleeping on couch on main level)  Home Access: Stairs to enter with rails  Entrance Stairs - Number of Steps: 3  Entrance Stairs - Rails: Left  Bathroom Toilet: Standard  Bathroom Equipment: 3-in-1 commode  Bathroom Accessibility: Not accessible  Home Equipment: Rolling walker  Receives Help From: Family  ADL Assistance: Independent  Ambulation Assistance: Independent (uses RW only outside of the home)  Transfer Assistance: Independent  Active : No  Additional Comments: son works during the day    Objective       AROM RLE (degrees)  RLE AROM: WFL  AROM LLE (degrees)  LLE AROM : WFL  Strength RLE  Comment: grossly 3+/4/5  Strength LLE  Comment: grossly 3+/5        Bed mobility  Supine to Sit: Stand by assistance (head of bed elevated 45 degrees)  Sit to Supine: Minimal assistance (assist to B LEs)  Scooting: Stand by assistance  Comment: pt sleeps propped-up on couch  Transfers  Sit to Stand: Contact guard assistance  Stand to sit: Contact guard assistance  Ambulation  Ambulation?: Yes  Ambulation 1  Device: Rolling Walker  Assistance: Contact guard assistance  Distance: 30ft  Comments: pt not wearing O2 upon arrival. SOB after ambulation- SpO2 = 89% - RN notified  Stairs/Curb  Stairs?: No     Balance  Sitting - Static: Good  Sitting - Dynamic: Good  Standing - Static: Fair;+ (SBA)  Standing - Dynamic: Fair (CGA)        Plan   Plan  Times per week: 5-6x/wk  Current Treatment Recommendations: Balance Training,Functional Mobility Training,Transfer Training,Gait Training,ADL/Self-care Training,Endurance Training,Safety Education & Training  Safety Devices  Type of devices: Call light within reach,Left in bed,Patient at risk for falls      Goals  Short term goals  Time Frame for Short term goals: 4-5 days  Short term goal 1: mod-I bed mobility  Short term goal 2: mod-I transfers  Short term goal 3: mod-I gait with/without device x 100-150ft  Short term goal 4: negotiate 3-4 steps with rail/SBA       Therapy Time   Individual Concurrent Group Co-treatment   Time In 1325         Time Out 1349         Minutes 24         Timed Code Treatment Minutes: [address] Celio Monzon, PT

## 2022-02-06 NOTE — ED PROVIDER NOTES
EMERGENCY DEPARTMENT ENCOUNTER    Pt Name: Esha Harrison  MRN: [id number]  [place] 1950  Date of evaluation: 2/6/22  CHIEF COMPLAINT       Chief Complaint   Patient presents with    Shortness of Breath     HISTORY OF PRESENT ILLNESS   50-year-old female presents for complaint of shortness of breath. Patient reports that has been having worsening shortness of breath over the last week. Patient reports that this morning was more short of breath and presented for evaluation. Patient also reports that she did been having worsening swelling in her lower extremities. Patient states that she was recently taken off of Lasix and switched to Bumex and states she has been taking it, reports that she been urinating a normal amount. States she has been coughing a little bit, states that she notices any shortness of breath most with exertion, denies any associated chest pain nausea or vomiting, denies any fevers at home. The history is provided by the patient. REVIEW OF SYSTEMS     Review of Systems   Constitutional: Negative for fever. HENT: Negative for congestion and ear pain. Eyes: Negative for pain. Respiratory: Positive for shortness of breath. Cardiovascular: Positive for leg swelling. Negative for chest pain and palpitations. Gastrointestinal: Negative for abdominal pain. Genitourinary: Negative for dysuria and flank pain. Musculoskeletal: Negative for back pain. Skin: Negative for color change. Neurological: Negative for numbness and headaches. Psychiatric/Behavioral: Negative for confusion. All other systems reviewed and are negative.     PASTMEDICAL HISTORY     Past Medical History:   Diagnosis Date    Acute CVA (cerebrovascular accident) ([facility] [address] 7/25/2020    Altered mental status 5/26/2021    Arthritis     Brain mass     Cellulitis and abscess     Cellulitis and abscess of unspecified site     Cellulitis of both lower extremities 5/26/2021    Cellulitis of left lower extremity 7/26/2020    Cellulitis of lower extremity 10/4/2020    CHF [facility]congestive heart failure) ([facility])     Chronic kidney disease, unspecified     Coronary atherosclerosis of native coronary artery     Essential hypertension 7/25/2020    Essential hypertension, malignant     Hallucinations 2/18/2021    Hard of hearing     Head contusion 9/9/2020    Hypokalemia 9/9/2020    Iron deficiency anemia, unspecified     Meningioma ([facility] 2/25/2021    Myocardial infarction ([facility]     Other and unspecified hyperlipidemia     Pure hypercholesterolemia     Toxic metabolic encephalopathy 9/13/2003    Type II or unspecified type diabetes mellitus without mention of complication, not stated as uncontrolled     Unspecified asthma(493.90)     Unspecified venous (peripheral) insufficiency     Unspecified vitamin D deficiency     UTI (urinary tract infection) 2/18/2021     Past Problem List  Patient Active Problem List   Diagnosis Code    Vitamin D deficiency E55.9    Venous insufficiency of both lower extremities I87.2    Type 2 diabetes mellitus with stage 3 chronic kidney disease, without long-term current use of insulin ([facility]) E11.22, N18.30    Coronary artery disease involving native coronary artery of native heart without angina pectoris I25.10    Iron deficiency anemia D50.9    Stage 3 chronic kidney disease ([facility] N18.30    Colonoscopy refused Z53.20    Pneumococcal vaccination declined Z28.21    Impaired hearing H91.90    Chronic diastolic CHF (congestive heart failure) ([facility]) I50.32    Chronic obstructive pulmonary disease ([facility] J44.9    HTN. Benign hypertension with CKD (chronic kidney disease) stage III ([facility]) I12.9, N18.30    Gout with tophi M1A. 9XX1    Hyperlipidemia with target LDL less than 70 E78.5    Dry skin dermatitis HANDS L85.3    Meningioma, cerebral ([facility]) D32.0    Paroxysmal atrial fibrillation ([facility]) I48.0    Influenza vaccination declined Z28.21    Recurrent UTI N39.0    tablet Take 1 tablet by mouth daily  Qty: 90 tablet, Refills: 3    Associated Diagnoses: Coronary artery disease involving native coronary artery of native heart without angina pectoris      colchicine (COLCRYS) 0.6 MG tablet Take 0.6 mg by mouth daily      metoprolol tartrate (LOPRESSOR) 50 MG tablet Take 1 tablet by mouth 2 times daily  Qty: 180 tablet, Refills: 3    Associated Diagnoses: Essential hypertension      sodium bicarbonate 650 MG tablet Take 1 tablet by mouth 3 times daily  Qty: 270 tablet, Refills: 3      allopurinol (ZYLOPRIM) 300 MG tablet Take 1 tablet by mouth daily Per rheumatologist  Qty: 90 tablet, Refills: 3    Associated Diagnoses: Gout with tophi      apixaban (ELIQUIS) 5 MG TABS tablet Take 1 tablet by mouth 2 times daily  Qty: 60 tablet, Refills: 11      Multiple Vitamins-Minerals (THERAPEUTIC MULTIVITAMIN-MINERALS) tablet Take 1 tablet by mouth daily    Associated Diagnoses: Type 2 diabetes mellitus with stage 3 chronic kidney disease, without long-term current use of insulin ([facility])      bumetanide (BUMEX) 0.5 MG tablet Take 1 tablet by mouth daily  Qty: 30 tablet, Refills: 3      chlorhexidine (HIBICLENS) 4 % external liquid for decontamination AND WASH LEGS EVERY DAY  Qty: 946 mL, Refills: 2    Associated Diagnoses: Cellulitis of lower extremity, unspecified laterality      Bismuth Tribromoph-Petrolatum (XEROFORM PETROLATUM PATCH 2\"X2\") PADS external pads Apply 1 each topically daily  Qty: 30 each, Refills: 2    Associated Diagnoses: Cellulitis of left lower extremity      blood glucose test strips (FREESTYLE LITE) strip 1 each by In Vitro route daily As needed.   Qty: 100 each, Refills: 3    Associated Diagnoses: Type 2 diabetes mellitus with complication (HCC)      FreeStyle Lancets MISC 1 each by Does not apply route daily Patient needs to contact office before any further refills will be approved  Qty: 90 each, Refills: 3    Associated Diagnoses: Type 2 diabetes mellitus with complication (HCC)      albuterol sulfate HFA (PROAIR HFA) 108 (90 Base) MCG/ACT inhaler Inhale 2 puffs into the lungs every 6 hours as needed for Wheezing or Shortness of Breath (cough)  Qty: 8 g, Refills: 3    Associated Diagnoses: Chronic obstructive pulmonary disease, unspecified COPD type (HCC)      Blood Pressure KIT 1 kit by Does not apply route three times daily  Qty: 1 kit, Refills: 0    Associated Diagnoses: Essential hypertension, malignant      blood glucose monitor kit and supplies 1 kit by Other route three times daily Dispense Butterfly Elite CBG Device  Qty: 1 kit, Refills: 0    Comments: E11.9  Associated Diagnoses: Type 2 diabetes mellitus with complication, unspecified whether long term insulin use           ALLERGIES     is allergic to latex, aleve [naproxen sodium], pioglitazone, claritin [loratadine], keflex [cephalexin], and lisinopril. FAMILY HISTORY     She indicated that the status of her mother is unknown. She indicated that the status of her father is unknown. She indicated that the status of her sister is unknown. She indicated that the status of her maternal grandmother is unknown. SOCIAL HISTORY       Social History     Tobacco Use    Smoking status: Former Smoker     Packs/day: 0.25     Years: 10.00     Pack years: 2.50     Quit date: 2000     Years since quittin.1    Smokeless tobacco: Never Used   Vaping Use    Vaping Use: Never used   Substance Use Topics    Alcohol use: Not Currently     Alcohol/week: 0.0 standard drinks    Drug use: No     PHYSICAL EXAM     INITIAL VITALS: BP (!) 192/74 Comment: nurse notified   Pulse 82   Temp 97.3 °F (36.3 °C) (Oral)   Resp 16   Ht 4' 11\" (1.499 m)   Wt 134 lb (60.8 kg)   SpO2 (!) 89%   BMI 27.06 kg/m²    Physical Exam  Vitals and nursing note reviewed. Constitutional:       General: She is not in acute distress. Appearance: Normal appearance. She is not toxic-appearing.    HENT:      Head: Normocephalic and atraumatic. Nose: Nose normal.      Mouth/Throat:      Mouth: Mucous membranes are moist.      Pharynx: Oropharynx is clear. Eyes:      Extraocular Movements: Extraocular movements intact. Conjunctiva/sclera: Conjunctivae normal.   Cardiovascular:      Rate and Rhythm: Normal rate and regular rhythm. Pulses: Normal pulses. Radial pulses are 2+ on the right side and 2+ on the left side. Dorsalis pedis pulses are 2+ on the right side and 2+ on the left side. Heart sounds: Normal heart sounds. Pulmonary:      Effort: Pulmonary effort is normal.      Breath sounds: Normal breath sounds. Comments: Crackles B/L lower lungs  Abdominal:      General: Bowel sounds are normal. There is no distension. Palpations: Abdomen is soft. Tenderness: There is no abdominal tenderness. Musculoskeletal:         General: Normal range of motion. Cervical back: Normal range of motion. Right lower le+ Pitting Edema present. Left lower le+ Pitting Edema present. Skin:     General: Skin is warm and dry. Capillary Refill: Capillary refill takes less than 2 seconds. Neurological:      General: No focal deficit present. Mental Status: She is alert. Psychiatric:         Mood and Affect: Mood normal.         MEDICAL DECISION MAKIN-year-old female presents for complaint of shortness of breath.   On initial examination no acute distress, in triage was noted to 89% on room air, was placed on supplemental O2 via nasal cannula, was noticed with crackles on exam, edema in the lower extremities, reported also history of kidney issues as well as CHF, will check labs and chest x-ray    Labs reviewed patient creatinine at baseline, troponins not significantly elevated BNP was notably elevated at 3726    X-ray was showing findings of CHF    Patient was given a dose of IV Bumex, given her worsening shortness of breath findings on chest x-ray, concern for CHF exacerbation, will admit given her new oxygen requirement    Results discussed with patient and family, discussed need for admission, both are agreeable      Spoke with Dr. Pina Dunn who accepts admission. Patient demonstrates understanding and agreement with the plan, was given the opportunity to ask questions, and these questions were answered to the best of the provided information at this time. VS stable for transfer. This dictation was prepared using [employer] voice recognition software. CRITICAL CARE:       PROCEDURES:    Procedures    DIAGNOSTIC RESULTS   EKG:All EKG's are interpreted by the Emergency Department Physician who either signs or Co-signs this chart in the absence of a cardiologist.    Sinus rhythm rate of 76, normal axis, normal intervals, no ST segment elevation or depression, nonspecific T wave change    RADIOLOGY:All plain film, CT, MRI, and formal ultrasound images (except ED bedside ultrasound) are read by the radiologist, see reports below, unless otherwisenoted in MDM or here. XR CHEST PORTABLE   Final Result   Findings suggesting likely CHF as described. LABS: All lab results were reviewed by myself, and all abnormals are listed below.   Labs Reviewed   CBC WITH AUTO DIFFERENTIAL - Abnormal; Notable for the following components:       Result Value    RBC 3.88 (*)     Hemoglobin 11.0 (*)     Hematocrit 34.8 (*)     RDW 16.5 (*)     Seg Neutrophils 78 (*)     Lymphocytes 12 (*)     Absolute Lymph # 0.90 (*)     All other components within normal limits   COMPREHENSIVE METABOLIC PANEL W/ REFLEX TO MG FOR LOW K - Abnormal; Notable for the following components:    Glucose 149 (*)     BUN 46 (*)     CREATININE 1.47 (*)     Alkaline Phosphatase 230 (*)     AST 32 (*)     Total Bilirubin 0.29 (*)     Total Protein 6.3 (*)     Albumin 3.3 (*)     GFR Non- 35 (*)     GFR  42 (*)     All other components within normal limits   TROPONIN - Abnormal; Notable for the following components:    Troponin, High Sensitivity 34 (*)     All other components within normal limits   BRAIN NATRIURETIC PEPTIDE - Abnormal; Notable for the following components:    Pro-BNP 3,726 (*)     All other components within normal limits   PROTIME-INR - Abnormal; Notable for the following components:    Protime 16.0 (*)     All other components within normal limits   COVID-19 & INFLUENZA COMBO   LIPASE   APTT   TROPONIN       EMERGENCY DEPARTMENTCOURSE:         Vitals:    Vitals:    02/06/22 0933 02/06/22 1102 02/06/22 1202 02/06/22 1345   BP: (!) 185/63 (!) 172/65 (!) 192/74    Pulse: 78 79 82    Resp: 17 15 16    Temp: 98 °F (36.7 °C)  97.3 °F (36.3 °C)    TempSrc:   Oral    SpO2: (!) 89% 95% 96% (!) 89%   Weight: 134 lb (60.8 kg)      Height: 4' 11\" (1.499 m)          The patient was given the following medications while in the emergency department:  Orders Placed This Encounter   Medications    sodium chloride flush 0.9 % injection 5-40 mL    sodium chloride flush 0.9 % injection 5-40 mL    0.9 % sodium chloride infusion    enoxaparin (LOVENOX) injection 40 mg    OR Linked Order Group     ondansetron (ZOFRAN-ODT) disintegrating tablet 4 mg     ondansetron (ZOFRAN) injection 4 mg    polyethylene glycol (GLYCOLAX) packet 17 g    OR Linked Order Group     acetaminophen (TYLENOL) tablet 650 mg     acetaminophen (TYLENOL) suppository 650 mg    bumetanide (BUMEX) injection 0.5 mg     CONSULTS:  IP CONSULT TO INTERNAL MEDICINE    FINAL IMPRESSION      1. Acute on chronic combined systolic (congestive) and diastolic (congestive) heart failure ([facility] [address]    2. Hypoxia          DISPOSITION/PLAN   DISPOSITION Admitted 02/06/2022 11:02:33 AM      PATIENT REFERRED TO:  No follow-up provider specified. DISCHARGE MEDICATIONS:  Current Discharge Medication List        The care is provided during an unprecedented national emergency due to the novel coronavirus, COVID 19.   Boni Agarwal Purnima, DO                    [address], [place]  02/06/22 [id number]

## 2022-02-06 NOTE — H&P
[facility]     HISTORY AND PHYSICAL EXAMINATION            Date:   2/6/2022  Patient name:  Shelly Fields  Date of admission:  2/6/2022  9:32 AM  MRN:   [id number]  Account:  [de-identified]  YOB: 1950  PCP:    Vickie Danielson MD  Room:   2090/2090-01  Code Status:    Full Code    Chief Complaint:     Chief Complaint   Patient presents with    Shortness of Breath     History Obtained From:     patient, family member - both daughters, electronic medical record    History of Present Illness:     Shelly Fields is a 76-year-old female with past medical history of CKD, asthma, COPD (has never been on home O2), asthma (no exacerbations in past 10 years), HTN, CAD s/p bypass, CVA, chronic A. Fib, diabetes mellitus, gout, sundowning syndrome, and anxiety who presents to the ED on 2/5 complaining of shortness of breath. She also complains of dizziness and lower extremity swelling. Symptoms started about 1 week ago with worsening shortness of breath over the past 2 days. Symptoms are worsened with exertion and lying flat. Patient reports medication changes including changes to diuretics by her outpatient nephrologist, Dr. Haider Calderon, about 2 to 3 weeks ago. She denies fever, chills, cough, sputum production, and denies any sick contacts or recent travel. In the ED, patient found with initial vital signs T 98, RR 17, pulse 78, /63, SPO2 89% on room air. Labs remarkable for BNP 3726. HS troponin elevated at 34 but near baseline. Creatinine elevated at 1.47 but lower than baseline Cr of ~1.7. EKG shows normal sinus rhythm with nonspecific ST abnormality. CXR shows findings suggestive of likely CHF. Patient started on IV diuretics. She is admitted for management of congestive heart failure exacerbation.       Past Medical History: Past Medical History:   Diagnosis Date    Acute CVA (cerebrovascular accident) ([facility] [address] 7/25/2020    Altered mental status 5/26/2021    Arthritis     Brain mass     Cellulitis and abscess     Cellulitis and abscess of unspecified site     Cellulitis of both lower extremities 5/26/2021    Cellulitis of left lower extremity 7/26/2020    Cellulitis of lower extremity 10/4/2020    CHF (congestive heart failure) (HCC)     Chronic kidney disease, unspecified     Coronary atherosclerosis of native coronary artery     Essential hypertension 7/25/2020    Essential hypertension, malignant     Hallucinations 2/18/2021    Hard of hearing     Head contusion 9/9/2020    Hypokalemia 9/9/2020    Iron deficiency anemia, unspecified     Meningioma ([facility] [address] 2/25/2021    [facility]     Other and unspecified hyperlipidemia     Pure hypercholesterolemia     Toxic metabolic encephalopathy 8/01/2829    Type II or unspecified type diabetes mellitus without mention of complication, not stated as uncontrolled     Unspecified asthma(493.90)     Unspecified venous (peripheral) insufficiency     Unspecified vitamin D deficiency     UTI (urinary tract infection) 2/18/2021        Past SurgicalHistory:     Past Surgical History:   Procedure Laterality Date    CORONARY ARTERY BYPASS GRAFT      x 3, Dr. Rita Coleman          Medications Prior to Admission:     Prior to Admission medications    Medication Sig Start Date End Date Taking?  Authorizing Provider   miconazole (MICOTIN) 2 % powder Apply topically 2 times daily UNDER THE SKIN FOLDS LONG TERM 1/7/22  Yes Maya Johnson MD   mupirocin (BACTROBAN) 2 % cream Apply 2 times dailY X 10 DAYS ON OPEN BLISTERS ON THE LEGS. 1/7/22 2/6/22 Yes Maya Johnson MD   magnesium oxide (MAG-OX) 400 (241.3 Mg) MG TABS tablet Take 1 tablet by mouth twice daily 12/21/21  Yes Priyanka Neri MD   atorvastatin (LIPITOR) 40 MG tablet Take 1 tablet by mouth once daily 12/20/21  Yes Cally Womack MD   desloratadine (CLARINEX) 5 MG tablet Take 1 tablet by mouth once daily 11/10/21  Yes Cally Womack MD   amLODIPine (NORVASC) 5 MG tablet Take 1 tablet by mouth daily 10/15/21  Yes Cally Womack MD   vitamin D (ERGOCALCIFEROL) 1.25 MG (11832 UT) CAPS capsule Take 1 capsule by mouth once a week 10/12/21  Yes Salazar Reyes MD   clopidogrel (PLAVIX) 75 MG tablet Take 1 tablet by mouth daily 10/12/21  Yes Cally Womack MD   colchicine (COLCRYS) 0.6 MG tablet Take 0.6 mg by mouth daily 8/30/21  Yes Historical Provider, MD   metoprolol tartrate (LOPRESSOR) 50 MG tablet Take 1 tablet by mouth 2 times daily 6/14/21  Yes Cally Womack MD   sodium bicarbonate 650 MG tablet Take 1 tablet by mouth 3 times daily 5/18/21 5/18/22 Yes [facility], MD   allopurinol (ZYLOPRIM) 300 MG tablet Take 1 tablet by mouth daily Per rheumatologist 3/19/21  Yes Cally Womack MD   apixaban (ELIQUIS) 5 MG TABS tablet Take 1 tablet by mouth 2 times daily 7/31/20  Yes Jose R Ibarra APRN - CNP   Multiple Vitamins-Minerals (THERAPEUTIC MULTIVITAMIN-MINERALS) tablet Take 1 tablet by mouth daily   Yes Historical Provider, MD   bumetanide (BUMEX) 0.5 MG tablet Take 1 tablet by mouth daily 1/25/22   [facility], MD   chlorhexidine (HIBICLENS) 4 % external liquid for decontamination AND [address] LEGS EVERY DAY 1/7/22   Cally Womack MD   Bismuth Tribromoph-Petrolatum (XEROFORM PETROLATUM PATCH 2\"X2\") PADS external pads Apply 1 each topically daily 12/10/21   Ruby Skelton APRN - CNP   blood glucose test strips (FREESTYLE LITE) strip 1 each by In Vitro route daily As needed.  3/19/21   Cally Womack MD   FreeStyle Lancets MISC 1 each by Does not apply route daily Patient needs to contact office before any further refills will be approved 3/19/21   Cally Womack MD   albuterol sulfate HFA (PROAIR HFA) 108 (90 Base) MCG/ACT inhaler Inhale 2 puffs into the lungs every 6 hours as needed for Wheezing or Shortness of Breath (cough)  Patient not taking: Reported on 1/7/2022 3/12/21   Tex Bañuelos MD   Blood Pressure KIT 1 kit by Does not apply route three times daily 12/19/19   Rukhsana Rothman MD   blood glucose monitor kit and supplies 1 kit by Other route three times daily Dispense Butterfly Elite CBG Device 8/20/19   Radha Trujillo MD        Allergies:     Latex, Aleve [naproxen sodium], Pioglitazone, Claritin [loratadine], Keflex [cephalexin], and Lisinopril    Social History:     Tobacco:    reports that she quit smoking about 22 years ago. She has a 2.50 pack-year smoking history. She has never used smokeless tobacco.  Alcohol:      reports previous alcohol use. Drug Use:  reports no history of drug use. Family History:     Family History   Problem Relation Age of Onset    Diabetes Mother     Heart Disease Mother     Heart Disease Father     Diabetes Sister     High Blood Pressure Sister     Diabetes Maternal Grandmother        Review of Systems:     Positive and Negative as described in HPI. Review of Systems   Constitutional: Negative for chills and fever. HENT: Positive for sinus pressure. Negative for rhinorrhea and sore throat. Eyes: Negative for discharge, redness and itching. Respiratory: Positive for shortness of breath. Negative for cough and wheezing. Cardiovascular: Positive for leg swelling. Negative for chest pain and palpitations. Gastrointestinal: Negative for nausea and vomiting. Genitourinary: Negative for difficulty urinating and frequency. Musculoskeletal: Positive for myalgias (bilateral leg soreness). Negative for arthralgias. Skin: Positive for wound. Neurological: Positive for dizziness and light-headedness. Psychiatric/Behavioral: The patient is nervous/anxious.         Physical Exam:   BP (!) 200/73 Comment: nurse notified   Pulse 111   Temp 98.1 °F (36.7 °C) (Oral)   Resp 18   Ht 4' 11\" (1.499 m)   Wt 134 lb (60.8 kg)   SpO2 96%   BMI 27.06 kg/m²   Temp (24hrs), Av.8 °F (36.6 °C), Min:97.3 °F (36.3 °C), Max:98.1 °F (36.7 °C)    No results for input(s): POCGLU in the last 72 hours. Intake/Output Summary (Last 24 hours) at 2022 1917  Last data filed at 2022 1830  Gross per 24 hour   Intake 420 ml   Output --   Net 420 ml     Vitals:    22 1202 22 1345 22 1651 22 1830   BP: (!) 192/74  (!) 200/73    Pulse: 82  95 111   Resp: 16  18    Temp: 97.3 °F (36.3 °C)  98.1 °F (36.7 °C)    TempSrc: Oral  Oral    SpO2: 96% (!) 89% 96% 96%   Weight:       Height:             Physical Exam  Constitutional:       General: She is not in acute distress. Appearance: Normal appearance. She is not diaphoretic. HENT:      Head: Normocephalic and atraumatic. Right Ear: External ear normal.      Left Ear: External ear normal.      Nose: Nose normal.      Comments: NC in place     Mouth/Throat:      Mouth: Mucous membranes are moist.      Pharynx: Oropharynx is clear. Eyes:      General:         Right eye: No discharge. Left eye: No discharge. Conjunctiva/sclera: Conjunctivae normal.   Cardiovascular:      Rate and Rhythm: Normal rate and regular rhythm. Comments: Left sided JVD noted  Pulmonary:      Breath sounds: Rales (Crackles on bilateral lower lung bases) present. No wheezing. Comments: Some SOB with positional movements,  On 2L O2 via NC  Abdominal:      General: Bowel sounds are normal. There is no distension. Tenderness: There is no abdominal tenderness. Musculoskeletal:      Right lower leg: Edema present. Left lower leg: Edema present. Comments: Significant bilateral pitting edema of LE   Skin:     Findings: Erythema present.       Comments: Erythema, flaking of skin, and ulcerations noted on bilateral lower extremities   Neurological:      Mental Status: She is alert and oriented to person, place, and time. Comments: hearing and visual impairments noted (per patient and patient's family, these are chronic)   Psychiatric:         Behavior: Behavior normal.         Investigations:     Laboratory Testing:  Recent Results (from the past 24 hour(s))   COVID-19 & Influenza Combo    Collection Time: 02/06/22  9:46 AM    Specimen: Nasopharyngeal Swab   Result Value Ref Range    Specimen Description . NASOPHARYNGEAL SWAB     Source . NASOPHARYNGEAL SWAB     SARS-CoV-2 RNA, RT PCR Not Detected Not Detected    INFLUENZA A Not Detected Not Detected    INFLUENZA B Not Detected Not Detected   CBC Auto Differential    Collection Time: 02/06/22 10:11 AM   Result Value Ref Range    WBC 7.2 3.5 - 11.0 k/uL    RBC 3.88 (L) 4.0 - 5.2 m/uL    Hemoglobin 11.0 (L) 12.0 - 16.0 g/dL    Hematocrit 34.8 (L) 36 - 46 %    MCV 89.5 80 - 100 fL    MCH 28.4 26 - 34 pg    MCHC 31.8 31 - 37 g/dL    RDW 16.5 (H) 11.5 - 14.9 %    Platelets 344 596 - 735 k/uL    MPV 7.5 6.0 - 12.0 fL    NRBC Automated NOT REPORTED per 100 WBC    Differential Type NOT REPORTED     Seg Neutrophils 78 (H) 36 - 66 %    Lymphocytes 12 (L) 24 - 44 %    Monocytes 7 1 - 7 %    Eosinophils % 2 0 - 4 %    Basophils 1 0 - 2 %    Immature Granulocytes NOT REPORTED 0 %    Segs Absolute 5.60 1.3 - 9.1 k/uL    Absolute Lymph # 0.90 (L) 1.0 - 4.8 k/uL    Absolute Mono # 0.50 0.1 - 1.3 k/uL    Absolute Eos # 0.10 0.0 - 0.4 k/uL    Basophils Absolute 0.00 0.0 - 0.2 k/uL    Absolute Immature Granulocyte NOT REPORTED 0.00 - 0.30 k/uL    WBC Morphology NOT REPORTED     RBC Morphology NOT REPORTED     Platelet Estimate NOT REPORTED    Comprehensive Metabolic Panel w/ Reflex to MG    Collection Time: 02/06/22 10:11 AM   Result Value Ref Range    Glucose 149 (H) 70 - 99 mg/dL    BUN 46 (H) 8 - 23 mg/dL    CREATININE 1.47 (H) 0.50 - 0.90 mg/dL    Bun/Cre Ratio NOT REPORTED 9 - 20    Calcium 9.3 8.6 - 10.4 mg/dL    Sodium 141 135 - 144 mmol/L    Potassium 4.6 3.7 - 5.3 mmol/L    Chloride 103 98 - 107 mmol/L    CO2 27 20 - 31 mmol/L    Anion Gap 11 9 - 17 mmol/L    Alkaline Phosphatase 230 (H) 35 - 104 U/L    ALT 22 5 - 33 U/L    AST 32 (H) <32 U/L    Total Bilirubin 0.29 (L) 0.3 - 1.2 mg/dL    Total Protein 6.3 (L) 6.4 - 8.3 g/dL    Albumin 3.3 (L) 3.5 - 5.2 g/dL    Albumin/Globulin Ratio NOT REPORTED 1.0 - 2.5    GFR Non- 35 (L) >60 mL/min    GFR  42 (L) >60 mL/min    GFR Comment          GFR Staging NOT REPORTED    Lipase    Collection Time: 02/06/22 10:11 AM   Result Value Ref Range    Lipase 17 13 - 60 U/L   Troponin    Collection Time: 02/06/22 10:11 AM   Result Value Ref Range    Troponin, High Sensitivity 34 (H) 0 - 14 ng/L    Troponin T NOT REPORTED <0.03 ng/mL    Troponin Interp NOT REPORTED    Brain Natriuretic Peptide    Collection Time: 02/06/22 10:11 AM   Result Value Ref Range    Pro-BNP 3,726 (H) <300 pg/mL    BNP Interpretation NOT REPORTED    Protime-INR    Collection Time: 02/06/22 10:11 AM   Result Value Ref Range    Protime 16.0 (H) 11.8 - 14.6 sec    INR 1.3    APTT    Collection Time: 02/06/22 10:11 AM   Result Value Ref Range    PTT 34.9 24.0 - 36.0 sec   EKG 12 Lead    Collection Time: 02/06/22 10:25 AM   Result Value Ref Range    Ventricular Rate 76 BPM    Atrial Rate 76 BPM    P-R Interval 144 ms    QRS Duration 90 ms    Q-T Interval 390 ms    QTc Calculation (Bazett) 438 ms    P Axis 55 degrees    R Axis -5 degrees    T Axis -5 degrees       Imaging/Diagnostics:  XR CHEST PORTABLE    Result Date: 2/6/2022  EXAMINATION: ONE XRAY VIEW OF THE CHEST 2/6/2022 10:03 am COMPARISON: 05/25/2021 HISTORY: Reason for Exam: sob FINDINGS: Mild cardiomegaly. Generalized interstitial prominence suggesting vascular congestion. Mild bilateral pleural effusions with associated atelectasis. No pneumothorax. Stable postsurgical changes of CABG. Findings suggesting likely CHF as described.        Assessment :      Primary Problem  Acute on chronic combined systolic (congestive) and diastolic (congestive) heart failure [facility])    Active Hospital Problems    Diagnosis Date Noted    Acute on chronic combined systolic (congestive) and diastolic (congestive) heart failure (HCC) [I50.43] 02/06/2022    Chronic a-fib (HCC) [I48.20] 02/06/2022    HTN.  Benign hypertension with CKD (chronic kidney disease) stage III ([facility]) [I12.9, N18.30] 02/20/2020    Impaired hearing [H91.90] 02/20/2020    Gout with tophi [M1A.9XX1] 02/20/2020    Type 2 diabetes mellitus with stage 3 chronic kidney disease, without long-term current use of insulin ([facility]) [E11.22, N18.30]     Stage 3 chronic kidney disease ([facility] [address] [N18.30]     Coronary artery disease involving native coronary artery of native heart without angina pectoris [I25.10]        Plan:     Patient status Admit as inpatient in the  Progressive Unit/Step down    Acute on chronic combined systolic and diastolic CHF  SOB x 1 week, increased   proBNP 3726  Last echo done 2020 EF 55%  Repeat echo  Was given Bumex 0.5 mg IV  IV Lasix 40 mg twice daily  Consult cardiology    Hypertension  SBP 200s  Was given hydralazine 10 mg IV once  Was given lisinopril 40 mg  Continue home Norvasc, Bumex, Lopressor    COPD (on RA at baseline) & hx of asthma  Pt has not used inhalers in years  No wheezing on physical exam  Increased O2 needs likely 2/2 CHF exacerbation  Wean off oxygen as tolerated    Hx of CAD   Trop 34; near baseline  Repeat Trop pending  EKG: Sinus rhythm rate of 76, normal axis, normal intervals, no ST segment elevation or depression, nonspecific T wave change  On Plavix     History of atrial fibrillation  EKG: Normal sinus rhythm  On Eliquis    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  Creatinine baseline 1.66  Creatinine 1.47 on presentation   Consult nephrology, pt follows with Dr. Jace Chen    Diabetes Mellitus type 2  11/2021 hemoglobin A1c 5.8  POCT every 4 hours  Low-dose insulin sliding scale  Hypoglycemia protocol    Gout  Continue home allopurinol oral 300 mg daily    Chronic venous stasis with ulcerations  Consult wound care    Code: FULL  DVT prophylaxis: on Lovenox  GI prophylaxis: none  PT OT evaluation  SW discharge planning      Please note: Use of a speech recognition software was used in the creation of portions of this note and dictation errors, including those of syntax and sound alike word substitutions, may have escaped proofreading. Consultations:   IP CONSULT TO INTERNAL MEDICINE  IP CONSULT TO CARDIOLOGY  IP CONSULT TO NEPHROLOGY    Patient is admitted as inpatient status because of co-morbiditieslisted above, severity of signs and symptoms as outlined, requirement for current medical therapies and most importantly because of direct risk to patient if care not provided in a hospital setting.     Sergio Bolaños DO  2/6/2022  7:17 PM    Copy sent to Dr. Guerita Tellez MD

## 2022-02-06 NOTE — PROGRESS NOTES
Attending Physician Statement  I have discussed the care of Chris Cuevas with the resident team. I have examined the patient myself and taken ros and hpi , including pertinent history and exam findings,  with the resident. I have reviewed the key elements of all parts of the encounter with the resident. I agree with the assessment, plan and orders as documented by the resident. Active Problems:    Acute on chronic combined systolic (congestive) and diastolic (congestive) heart failure (HCC)  Resolved Problems:    * No resolved hospital problems.  *    chf exacerbation, will start iv diuresis  Hypertensive urgency- will adjust home BP meds  CKD- creat not elevated from baseline, will follow  Elevated liver enzymes-reggieley from hepatic congestion, will repeat      Electronically signed by Kaylene Vargas MD

## 2022-02-06 NOTE — PROGRESS NOTES
Patient up to bathroom with assist from [facility] and a front wheel walker. Complained of dizziness and shortness of breath returning back to bed. Spot checked SPO2 bedside with a result of 96% and a Pulse of 111. Tolerated ambulation fairly well.

## 2022-02-07 ENCOUNTER — APPOINTMENT (OUTPATIENT)
Dept: NON INVASIVE DIAGNOSTICS | Age: 72
DRG: 291 | End: 2022-02-07
Payer: MEDICARE

## 2022-02-07 LAB
-: ABNORMAL
AMORPHOUS: ABNORMAL
ANION GAP SERPL CALCULATED.3IONS-SCNC: 8 MMOL/L (ref 9–17)
BACTERIA: ABNORMAL
BILIRUBIN URINE: NEGATIVE
BUN BLDV-MCNC: 42 MG/DL (ref 8–23)
BUN/CREAT BLD: ABNORMAL (ref 9–20)
C-REACTIVE PROTEIN: <3 MG/L (ref 0–5)
CALCIUM SERPL-MCNC: 8.8 MG/DL (ref 8.6–10.4)
CASTS UA: ABNORMAL /LPF
CHLORIDE BLD-SCNC: 109 MMOL/L (ref 98–107)
CO2: 29 MMOL/L (ref 20–31)
COLOR: YELLOW
COMMENT UA: ABNORMAL
CREAT SERPL-MCNC: 1.34 MG/DL (ref 0.5–0.9)
CRYSTALS, UA: ABNORMAL /HPF
EKG ATRIAL RATE: 76 BPM
EKG P AXIS: 55 DEGREES
EKG P-R INTERVAL: 144 MS
EKG Q-T INTERVAL: 390 MS
EKG QRS DURATION: 90 MS
EKG QTC CALCULATION (BAZETT): 438 MS
EKG R AXIS: -5 DEGREES
EKG T AXIS: -5 DEGREES
EKG VENTRICULAR RATE: 76 BPM
EPITHELIAL CELLS UA: ABNORMAL /HPF
GFR AFRICAN AMERICAN: 47 ML/MIN
GFR NON-AFRICAN AMERICAN: 39 ML/MIN
GFR SERPL CREATININE-BSD FRML MDRD: ABNORMAL ML/MIN/{1.73_M2}
GFR SERPL CREATININE-BSD FRML MDRD: ABNORMAL ML/MIN/{1.73_M2}
GLUCOSE BLD-MCNC: 101 MG/DL (ref 65–105)
GLUCOSE BLD-MCNC: 110 MG/DL (ref 70–99)
GLUCOSE BLD-MCNC: 125 MG/DL (ref 65–105)
GLUCOSE BLD-MCNC: 152 MG/DL (ref 65–105)
GLUCOSE BLD-MCNC: 80 MG/DL (ref 65–105)
GLUCOSE BLD-MCNC: 94 MG/DL (ref 65–105)
GLUCOSE URINE: NEGATIVE
HCT VFR BLD CALC: 33 % (ref 36–46)
HEMOGLOBIN: 10.3 G/DL (ref 12–16)
KETONES, URINE: NEGATIVE
LEUKOCYTE ESTERASE, URINE: NEGATIVE
LV EF: 55 %
LVEF MODALITY: NORMAL
MCH RBC QN AUTO: 28.2 PG (ref 26–34)
MCHC RBC AUTO-ENTMCNC: 31.3 G/DL (ref 31–37)
MCV RBC AUTO: 89.9 FL (ref 80–100)
MUCUS: ABNORMAL
NITRITE, URINE: NEGATIVE
NRBC AUTOMATED: ABNORMAL PER 100 WBC
OTHER OBSERVATIONS UA: ABNORMAL
PDW BLD-RTO: 17 % (ref 11.5–14.9)
PH UA: 6 (ref 5–8)
PLATELET # BLD: 275 K/UL (ref 150–450)
PMV BLD AUTO: 8.1 FL (ref 6–12)
POTASSIUM SERPL-SCNC: 4.3 MMOL/L (ref 3.7–5.3)
PROTEIN UA: ABNORMAL
RBC # BLD: 3.67 M/UL (ref 4–5.2)
RBC UA: ABNORMAL /HPF
RENAL EPITHELIAL, UA: ABNORMAL /HPF
SODIUM BLD-SCNC: 146 MMOL/L (ref 135–144)
SPECIFIC GRAVITY UA: 1.01 (ref 1–1.03)
TRICHOMONAS: ABNORMAL
TURBIDITY: CLEAR
URINE HGB: ABNORMAL
UROBILINOGEN, URINE: NORMAL
WBC # BLD: 7.5 K/UL (ref 3.5–11)
WBC UA: ABNORMAL /HPF
YEAST: ABNORMAL

## 2022-02-07 PROCEDURE — 86140 C-REACTIVE PROTEIN: CPT

## 2022-02-07 PROCEDURE — 6370000000 HC RX 637 (ALT 250 FOR IP): Performed by: STUDENT IN AN ORGANIZED HEALTH CARE EDUCATION/TRAINING PROGRAM

## 2022-02-07 PROCEDURE — 99213 OFFICE O/P EST LOW 20 MIN: CPT

## 2022-02-07 PROCEDURE — 6370000000 HC RX 637 (ALT 250 FOR IP)

## 2022-02-07 PROCEDURE — 93306 TTE W/DOPPLER COMPLETE: CPT

## 2022-02-07 PROCEDURE — 6360000002 HC RX W HCPCS: Performed by: INTERNAL MEDICINE

## 2022-02-07 PROCEDURE — 2500000003 HC RX 250 WO HCPCS: Performed by: STUDENT IN AN ORGANIZED HEALTH CARE EDUCATION/TRAINING PROGRAM

## 2022-02-07 PROCEDURE — 6370000000 HC RX 637 (ALT 250 FOR IP): Performed by: INTERNAL MEDICINE

## 2022-02-07 PROCEDURE — 97116 GAIT TRAINING THERAPY: CPT

## 2022-02-07 PROCEDURE — 97530 THERAPEUTIC ACTIVITIES: CPT

## 2022-02-07 PROCEDURE — 2580000003 HC RX 258: Performed by: INTERNAL MEDICINE

## 2022-02-07 PROCEDURE — 85027 COMPLETE CBC AUTOMATED: CPT

## 2022-02-07 PROCEDURE — 2500000003 HC RX 250 WO HCPCS: Performed by: INTERNAL MEDICINE

## 2022-02-07 PROCEDURE — 97166 OT EVAL MOD COMPLEX 45 MIN: CPT

## 2022-02-07 PROCEDURE — 36415 COLL VENOUS BLD VENIPUNCTURE: CPT

## 2022-02-07 PROCEDURE — 99232 SBSQ HOSP IP/OBS MODERATE 35: CPT | Performed by: INTERNAL MEDICINE

## 2022-02-07 PROCEDURE — 2060000000 HC ICU INTERMEDIATE R&B

## 2022-02-07 PROCEDURE — 82947 ASSAY GLUCOSE BLOOD QUANT: CPT

## 2022-02-07 PROCEDURE — 81001 URINALYSIS AUTO W/SCOPE: CPT

## 2022-02-07 PROCEDURE — 80048 BASIC METABOLIC PNL TOTAL CA: CPT

## 2022-02-07 RX ORDER — CLINDAMYCIN PHOSPHATE 600 MG/50ML
600 INJECTION INTRAVENOUS EVERY 8 HOURS
Status: DISCONTINUED | OUTPATIENT
Start: 2022-02-07 | End: 2022-02-07

## 2022-02-07 RX ADMIN — SODIUM BICARBONATE TAB 650 MG 650 MG: 650 TAB at 20:35

## 2022-02-07 RX ADMIN — DIPHENHYDRAMINE HYDROCHLORIDE 25 MG: 50 INJECTION, SOLUTION INTRAMUSCULAR; INTRAVENOUS at 20:35

## 2022-02-07 RX ADMIN — ACETAMINOPHEN 650 MG: 325 TABLET, FILM COATED ORAL at 14:43

## 2022-02-07 RX ADMIN — SODIUM CHLORIDE, PRESERVATIVE FREE 10 ML: 5 INJECTION INTRAVENOUS at 20:40

## 2022-02-07 RX ADMIN — SODIUM BICARBONATE TAB 650 MG 650 MG: 650 TAB at 13:47

## 2022-02-07 RX ADMIN — ACETAMINOPHEN 650 MG: 325 TABLET, FILM COATED ORAL at 03:19

## 2022-02-07 RX ADMIN — INSULIN LISPRO 1 UNITS: 100 INJECTION, SOLUTION INTRAVENOUS; SUBCUTANEOUS at 20:35

## 2022-02-07 RX ADMIN — APIXABAN 5 MG: 5 TABLET, FILM COATED ORAL at 20:35

## 2022-02-07 RX ADMIN — SODIUM BICARBONATE TAB 650 MG 650 MG: 650 TAB at 08:22

## 2022-02-07 RX ADMIN — ATORVASTATIN CALCIUM 40 MG: 40 TABLET, FILM COATED ORAL at 08:22

## 2022-02-07 RX ADMIN — ALLOPURINOL 300 MG: 300 TABLET ORAL at 08:22

## 2022-02-07 RX ADMIN — LOSARTAN POTASSIUM 25 MG: 25 TABLET, FILM COATED ORAL at 08:22

## 2022-02-07 RX ADMIN — BUMETANIDE 2 MG: 0.25 INJECTION INTRAMUSCULAR; INTRAVENOUS at 10:40

## 2022-02-07 RX ADMIN — BUMETANIDE 2 MG: 0.25 INJECTION INTRAMUSCULAR; INTRAVENOUS at 20:40

## 2022-02-07 RX ADMIN — CLOPIDOGREL BISULFATE 75 MG: 75 TABLET ORAL at 08:22

## 2022-02-07 RX ADMIN — SODIUM CHLORIDE, PRESERVATIVE FREE 10 ML: 5 INJECTION INTRAVENOUS at 08:25

## 2022-02-07 RX ADMIN — METOPROLOL TARTRATE 50 MG: 50 TABLET ORAL at 08:22

## 2022-02-07 RX ADMIN — AMLODIPINE BESYLATE 5 MG: 5 TABLET ORAL at 08:22

## 2022-02-07 RX ADMIN — CLINDAMYCIN PHOSPHATE 600 MG: 600 INJECTION, SOLUTION INTRAVENOUS at 10:58

## 2022-02-07 RX ADMIN — APIXABAN 5 MG: 5 TABLET, FILM COATED ORAL at 08:22

## 2022-02-07 ASSESSMENT — ENCOUNTER SYMPTOMS
VOMITING: 0
RHINORRHEA: 0
SHORTNESS OF BREATH: 1
COUGH: 1
SORE THROAT: 0
NAUSEA: 0
EYE ITCHING: 1

## 2022-02-07 ASSESSMENT — PAIN SCALES - GENERAL
PAINLEVEL_OUTOF10: 0
PAINLEVEL_OUTOF10: 3
PAINLEVEL_OUTOF10: 3
PAINLEVEL_OUTOF10: 0

## 2022-02-07 NOTE — CARE COORDINATION
ONGOING DISCHARGE PLAN:    Patient is alert and oriented x4. Spoke with patient regarding discharge plan and patient confirms that plan is still home without needs. Declined VNS. States she has 3 supportive children. Active order for IV Bumex 2mg BID. Cre 1.34.  2D echo today showed EF 55%. Will continue to follow for additional discharge needs.     Electronically signed by Keily Hendricks RN on 2/7/2022 at 2:02 PM

## 2022-02-07 NOTE — PLAN OF CARE
Problem: Falls - Risk of:  Goal: Will remain free from falls  Description: Will remain free from falls  Outcome: Met This Shift  Goal: Absence of physical injury  Description: Absence of physical injury  Outcome: Met This Shift     Problem: Cardiac:  Goal: Ability to maintain vital signs within normal range will improve  Description: Ability to maintain vital signs within normal range will improve  Outcome: Ongoing  Goal: Cardiovascular alteration will improve  Description: Cardiovascular alteration will improve  Outcome: Ongoing     Problem: Physical Regulation:  Goal: Complications related to the disease process, condition or treatment will be avoided or minimized  Description: Complications related to the disease process, condition or treatment will be avoided or minimized  Outcome: Ongoing     Problem: Breathing Pattern - Ineffective:  Goal: Ability to achieve and maintain a regular respiratory rate will improve  Description: Ability to achieve and maintain a regular respiratory rate will improve  Outcome: Ongoing     Problem: Anxiety:  Goal: Level of anxiety will decrease  Description: Level of anxiety will decrease  Outcome: Ongoing

## 2022-02-07 NOTE — PROGRESS NOTES
Patient was up to bathroom, states she suddenly became very \"hot\" feeling, was slightly dizzy. Does state she was anxious. States she thinks she had a \"panic attack\"  Daughter was  With patient at the time of this incident. Patient did not fall or become disoriented. Daughter assisted patient sharee to bed. Pablito Garcia went to see patient took blood pressure, B/P was elevated 192/74.

## 2022-02-07 NOTE — PROGRESS NOTES
[facility]    Occupational Therapy Evaluation  Date: 22  Patient Name: Ravi Clarke       Room:   MRN: [id number]  Account: [de-identified]   : 1950  (72 y.o.) Gender: female     Discharge Recommendations:  Further Occupational Therapy is recommended upon facility discharge. Equipment Needed:  (TBD)    Referring Practitioner: Dariana Higginbotham MD  Diagnosis: Acute on chronic combined systolic and diastolic heart failure       Treatment Diagnosis: Impaired self care status  Past Medical History:  has a past medical history of Acute CVA (cerebrovascular accident) ([address] Altered mental status, Arthritis, Brain mass, Cellulitis and abscess, Cellulitis and abscess of unspecified site, Cellulitis of both lower extremities, Cellulitis of left lower extremity, Cellulitis of lower extremity, CHF (congestive heart failure) ([address] Chronic kidney disease, unspecified, Coronary atherosclerosis of native coronary artery, Essential hypertension, Essential hypertension, malignant, Hallucinations, Hard of hearing, Head contusion, Hypokalemia, Iron deficiency anemia, unspecified, Meningioma ([address] Myocardial infarction ([address] Other and unspecified hyperlipidemia, Pure hypercholesterolemia, Toxic metabolic encephalopathy, Type II or unspecified type diabetes mellitus without mention of complication, not stated as uncontrolled, Unspecified asthma(493.90), Unspecified venous (peripheral) insufficiency, Unspecified vitamin D deficiency, and UTI (urinary tract infection). Past Surgical History:   has a past surgical history that includes Tonsillectomy and adenoidectomy and Coronary artery bypass graft.     Restrictions  Restrictions/Precautions: Fall Risk,General Precautions  Implants present? :  (Pt denies)  Required Braces or Orthoses?: No     Vitals  Temp: 97.8 °F (36.6 °C)  Pulse: 88  Resp: 18  BP: (!) 160/60  Height: 4' 11\" (149.9 cm)  Weight: 153 lb (69.4 kg)  BMI (Calculated): 31  Oxygen Therapy  SpO2: 98 %  Pulse Oximeter Device Mode: Intermittent  Pulse Oximeter Device Location: Finger  O2 Device: Nasal cannula  O2 Flow Rate (L/min): 2 L/min  Level of Consciousness: Alert (0)    Subjective  Subjective: Pt resting in bed upon arrival. Pt was pleasant and agreeable to OT eval  Comments: Ok per RN for OT eval  Overall Orientation Status: Within Functional Limits  Vision  Vision: Impaired  Vision Exceptions: Wears glasses at all times,Cataracts  Hearing  Hearing: Exceptions to [facility]  Hearing Exceptions: Hard of hearing/hearing concerns  Social/Functional History  Lives With: Son  Type of Home: House  Home Layout: Two level,Bed/Bath upstairs,1/2 bath on main level (Pt sleeps on couch on main level)  Home Access: Stairs to enter with rails  Entrance Stairs - Number of Steps: 3  Entrance Stairs - Rails: Left  Bathroom Shower/Tub:  (Washes up at sink on main level)  Bathroom Toilet: Standard  Bathroom Equipment: Toilet raiser (with rails)  Bathroom Accessibility: Not accessible  Home Equipment: Rolling walker,Reacher  Receives Help From: Family  ADL Assistance: Independent  Homemaking Assistance: Independent (Shares with son)  Ambulation Assistance: Independent (with RW)  Transfer Assistance: Independent  Active : No  Patient's  Info: Son or daughter drives  IADL Comments: Pt reports that she has not slept in a bed in over 35 years. Sleeps on couch in reclined position  Additional Comments: Pt repots that son works full time during the day and is able to assist as needed. Pt reports that 2 daughters live close by and are able to assist as needed.         Objective          Sensation  Overall Sensation Status: WFL (pt denies)   ADL  Feeding: Setup  Grooming: Setup  UE Bathing: Stand by assistance  LE Bathing: Minimal assistance  UE Dressing: Stand by assistance  LE Dressing: Minimal assistance  Toileting: Dependent/Total (smith catheter)  Additional Comments: ADL scores based on clinical reasoning and skilled observation unless otherwise noted. Pt currenlty limited due to decreased strength, balance, activity tolerance, and body habitus impacting safety and independence with self care tasks. UE Function           LUE Strength  Gross LUE Strength: WFL  L Hand General: 4-/5     LUE Tone: Normotonic     LUE AROM (degrees)  LUE AROM : WFL     Left Hand AROM (degrees)  Left Hand AROM: WFL  RUE Strength  Gross RUE Strength: WFL  R Hand General: 4-/5      RUE Tone: Normotonic     RUE AROM (degrees)  RUE AROM : WFL     Right Hand AROM (degrees)  Right Hand AROM: WFL    Fine Motor Skills  Coordination  Movements Are Fluid And Coordinated: Yes                           Mobility  Supine to Sit: Stand by assistance  Sit to Supine: Minimal assistance (A with BLE)       Balance  Sitting Balance: Stand by assistance  Standing Balance: Contact guard assistance  Standing Balance  Time: 1 minute  Activity: functional transfer/mobility  Comment: with RW  Functional Mobility  Functional - Mobility Device: Rolling Walker  Activity: Other (Small side steps towards [facility])  Assist Level: Contact guard assistance  Functional Mobility Comments: Verbal cues for hand placement and safety. O2 monitored throuhgout with Spo2 maintaining above 90%  Bed mobility  Supine to Sit: Stand by assistance  Sit to Supine: Minimal assistance (A with BLE)  Scooting: Stand by assistance  Comment: Bed mobility completed with HOB elevated. Increased time to complete. Pt required A with BLE back into bed. Transfers  Sit to stand: Contact guard assistance  Stand to sit: Contact guard assistance  Transfer Comments: Verbal cues for hand placement and safety  Functional Activity Tolerance  Functional Activity Tolerance:  Tolerates 30 min exercise with multiple rests     Assessment  Assessment  Performance deficits / Impairments: Decreased ADL status,Decreased functional mobility ,Decreased strength,Decreased safe awareness,Decreased endurance,Decreased balance,Decreased high-level IADLs  Treatment Diagnosis: Impaired self care status  Prognosis: Good  Decision Making: Medium Complexity  REQUIRES OT FOLLOW UP: Yes  Discharge Recommendations: Patient would benefit from continued therapy after discharge  Activity Tolerance: Patient limited by fatigue         Functional Outcome Measures  AM-PAC Daily Activity Inpatient   How much help for putting on and taking off regular lower body clothing?: A Little  How much help for Bathing?: A Little  How much help for Toileting?: Total  How much help for putting on and taking off regular upper body clothing?: A Little  How much help for taking care of personal grooming?: A Little  How much help for eating meals?: A Little  AM-[facility] Inpatient Daily Activity Raw Score: 16  AM-PAC Inpatient ADL T-Scale Score : 35.96  ADL Inpatient CMS 0-100% Score: 53.32  ADL Inpatient CMS G-Code Modifier : CK       Goals  Patient Goals   Patient goals : To go home  Short term goals  Time Frame for Short term goals: By discharge   Short term goal 1: Pt will complete BADLs with supervision and good safety  Short term goal 2: Pt will complete functional transfers/mobility during self care tasks with supervision and good safety while maintaining SpO2 above 90%  Short term goal 3: Pt will tolerate standing 5+ minutes during functional activities to increase safety and independence with self care and mobility  Short term goal 4: Pt will verbalize/demonstrate good understanding of home safety and energy conservation strategies to increase safety and independence with self care and mobility  Short term goal 5: Pt will participate in 15+ minutes of therapeutic exercises/functional activities to increase safety and independence with self care and mobility    Plan  Safety Devices  Safety Devices in place: Yes  Type of devices:  All fall risk precautions in place,Call light within reach,Patient at risk for falls,Left in chair,Nurse notified     Plan  Times per week: 4-6  Current Treatment Recommendations: Self-Care / ADL,Strengthening,Balance Training,Functional Mobility Training,Endurance Training,Pain Management,Safety Education & Training,Patient/Caregiver Education & Training,Equipment Evaluation, Education, & procurement,Home Management Training       Equipment Recommendations  Equipment Needed:  (TBD)  OT Individual Minutes  Time In: 4072  Time Out: 9716  Minutes: 25    Electronically signed by Mariela Obrien OT on 2/7/22 at 1:12 PM EST

## 2022-02-07 NOTE — PROGRESS NOTES
[facility]    PROGRESS NOTE             2/7/2022    7:18 AM    Name:   Emily Carrera  MRN:     [id number]     Acct:      [de-identified]   Room:   2090/2090-01   Day:  1  Admit Date:  2/6/2022  9:32 AM    PCP:  Henry Urias MD  Code Status:  Full Code    Subjective:     C/C:   Chief Complaint   Patient presents with    Shortness of Breath     Interval History Status: improved. Patient seen and examined. Reports improvement in shortness of breath,  Increased urinary frequency. Patient was able to sleep overnight,  Is tolerating diet,  And has no new complaints. Stanford catheter placed yesterday per nephrology recommendations. Brief History:     14-year-old female with past medical history of heart failure with reduced ejection fraction (LVEF 45 to 50% in July 2020 secondary to ischemic cardiomyopathy), CAD s/p bypass, type 2 diabetes mellitus, hypertension, meningioma, hard of hearing, visual impairment, CKD stage III (baseline creatinine 1.6 mg/dL, follows with Dr. Kellen Raymundo), who presented on 2/6 complaining of increased shortness of breath, leg swelling, and dizziness. Of note, patient had been switched from Lasix to Bumex about 1 week ago by her outpatient nephrologist.    Review of Systems:     Review of Systems   Constitutional: Positive for fatigue. Negative for chills and fever. HENT: Negative for rhinorrhea and sore throat. Eyes: Positive for itching and visual disturbance (chronic). Respiratory: Positive for cough (mild, dry, occasional) and shortness of breath. Cardiovascular: Negative for chest pain and palpitations. Gastrointestinal: Negative for nausea and vomiting. Genitourinary: Positive for frequency. Negative for difficulty urinating and dysuria. Skin: Positive for wound. Neurological: Positive for dizziness and light-headedness. Psychiatric/Behavioral: Negative for sleep disturbance.  The patient is nervous/anxious. Medications: Allergies:     Allergies   Allergen Reactions    Latex Rash    Aleve [Naproxen Sodium]      Chronic kidney disease stage III, CHF    Pioglitazone Other (See Comments)     Congestive heart failure    Claritin [Loratadine]     Keflex [Cephalexin]     Lisinopril      Needs clarification of contraindication       Current Meds:   Scheduled Meds:    sodium chloride flush  5-40 mL IntraVENous 2 times per day    amLODIPine  5 mg Oral Daily    apixaban  5 mg Oral BID    metoprolol tartrate  50 mg Oral BID    allopurinol  300 mg Oral Daily    atorvastatin  40 mg Oral Daily    clopidogrel  75 mg Oral Daily    sodium bicarbonate  650 mg Oral TID    insulin lispro  0-6 Units SubCUTAneous TID WC    insulin lispro  0-3 Units SubCUTAneous Nightly    bumetanide  2 mg IntraVENous BID    losartan  25 mg Oral Daily     Continuous Infusions:    sodium chloride      dextrose       PRN Meds: sodium chloride flush, sodium chloride, ondansetron **OR** ondansetron, polyethylene glycol, acetaminophen **OR** acetaminophen, albuterol sulfate HFA, glucose, dextrose, glucagon (rDNA), dextrose, diphenhydrAMINE    Data:     Past Medical History:   has a past medical history of Acute CVA (cerebrovascular accident) ([facility] [address] Altered mental status, Arthritis, Brain mass, Cellulitis and abscess, Cellulitis and abscess of unspecified site, Cellulitis of both lower extremities, Cellulitis of left lower extremity, Cellulitis of lower extremity, CHF (congestive heart failure) ([address] Chronic kidney disease, unspecified, Coronary atherosclerosis of native coronary artery, Essential hypertension, Essential hypertension, malignant, Hallucinations, Hard of hearing, Head contusion, Hypokalemia, Iron deficiency anemia, unspecified, Meningioma ([address] Myocardial infarction ([address] Other and unspecified hyperlipidemia, Pure hypercholesterolemia, Toxic metabolic encephalopathy, Type II or unspecified type diabetes mellitus without mention of complication, not stated as uncontrolled, Unspecified asthma(493.90), Unspecified venous (peripheral) insufficiency, Unspecified vitamin D deficiency, and UTI (urinary tract infection). Social History:   reports that she quit smoking about 22 years ago. She has a 2.50 pack-year smoking history. She has never used smokeless tobacco. She reports previous alcohol use. She reports that she does not use drugs. Family History:   Family History   Problem Relation Age of Onset    Diabetes Mother     Heart Disease Mother     Heart Disease Father     Diabetes Sister     High Blood Pressure Sister     Diabetes Maternal Grandmother        Vitals:  BP (!) 159/57   Pulse 95   Temp 97.7 °F (36.5 °C) (Oral)   Resp 18   Ht 4' 11\" (1.499 m)   Wt 153 lb (69.4 kg)   SpO2 96%   BMI 30.90 kg/m²   Temp (24hrs), Av.8 °F (36.6 °C), Min:97.3 °F (36.3 °C), Max:98.1 °F (36.7 °C)    Recent Labs     22  2332 22  0656   POCGLU 101 80     Vitals:    22 1651 22 1830 22 1951 22 2338   BP: (!) 200/73  (!) 147/49 (!) 159/57   Pulse: 95 111 97 95   Resp: 18  18 18   Temp: 98.1 °F (36.7 °C)  97.7 °F (36.5 °C) 97.7 °F (36.5 °C)   TempSrc: Oral  Oral Oral   SpO2: 96% 96% 95% 96%   Weight:    153 lb (69.4 kg)   Height:           I/O(24Hr): Intake/Output Summary (Last 24 hours) at 2022 9041  Last data filed at 2022 4658  Gross per 24 hour   Intake 420 ml   Output 550 ml   Net -130 ml       Labs:  Recent Results (from the past 24 hour(s))   COVID-19 & Influenza Combo    Collection Time: 22  9:46 AM    Specimen: Nasopharyngeal Swab   Result Value Ref Range    Specimen Description . NASOPHARYNGEAL SWAB     Source . NASOPHARYNGEAL SWAB     SARS-CoV-2 RNA, RT PCR Not Detected Not Detected    INFLUENZA A Not Detected Not Detected    INFLUENZA B Not Detected Not Detected   CBC Auto Differential    Collection Time: 22 10:11 AM Result Value Ref Range    WBC 7.2 3.5 - 11.0 k/uL    RBC 3.88 (L) 4.0 - 5.2 m/uL    Hemoglobin 11.0 (L) 12.0 - 16.0 g/dL    Hematocrit 34.8 (L) 36 - 46 %    MCV 89.5 80 - 100 fL    MCH 28.4 26 - 34 pg    MCHC 31.8 31 - 37 g/dL    RDW 16.5 (H) 11.5 - 14.9 %    Platelets 121 536 - 345 k/uL    MPV 7.5 6.0 - 12.0 fL    NRBC Automated NOT REPORTED per 100 WBC    Differential Type NOT REPORTED     Seg Neutrophils 78 (H) 36 - 66 %    Lymphocytes 12 (L) 24 - 44 %    Monocytes 7 1 - 7 %    Eosinophils % 2 0 - 4 %    Basophils 1 0 - 2 %    Immature Granulocytes NOT REPORTED 0 %    Segs Absolute 5.60 1.3 - 9.1 k/uL    Absolute Lymph # 0.90 (L) 1.0 - 4.8 k/uL    Absolute Mono # 0.50 0.1 - 1.3 k/uL    Absolute Eos # 0.10 0.0 - 0.4 k/uL    Basophils Absolute 0.00 0.0 - 0.2 k/uL    Absolute Immature Granulocyte NOT REPORTED 0.00 - 0.30 k/uL    WBC Morphology NOT REPORTED     RBC Morphology NOT REPORTED     Platelet Estimate NOT REPORTED    Comprehensive Metabolic Panel w/ Reflex to MG    Collection Time: 02/06/22 10:11 AM   Result Value Ref Range    Glucose 149 (H) 70 - 99 mg/dL    BUN 46 (H) 8 - 23 mg/dL    CREATININE 1.47 (H) 0.50 - 0.90 mg/dL    Bun/Cre Ratio NOT REPORTED 9 - 20    Calcium 9.3 8.6 - 10.4 mg/dL    Sodium 141 135 - 144 mmol/L    Potassium 4.6 3.7 - 5.3 mmol/L    Chloride 103 98 - 107 mmol/L    CO2 27 20 - 31 mmol/L    Anion Gap 11 9 - 17 mmol/L    Alkaline Phosphatase 230 (H) 35 - 104 U/L    ALT 22 5 - 33 U/L    AST 32 (H) <32 U/L    Total Bilirubin 0.29 (L) 0.3 - 1.2 mg/dL    Total Protein 6.3 (L) 6.4 - 8.3 g/dL    Albumin 3.3 (L) 3.5 - 5.2 g/dL    Albumin/Globulin Ratio NOT REPORTED 1.0 - 2.5    GFR Non- 35 (L) >60 mL/min    GFR  42 (L) >60 mL/min    GFR Comment          GFR Staging NOT REPORTED    Lipase    Collection Time: 02/06/22 10:11 AM   Result Value Ref Range    Lipase 17 13 - 60 U/L   Troponin    Collection Time: 02/06/22 10:11 AM   Result Value Ref Range    Troponin, High Sensitivity 34 (H) 0 - 14 ng/L    Troponin T NOT REPORTED <0.03 ng/mL    Troponin Interp NOT REPORTED    Brain Natriuretic Peptide    Collection Time: 02/06/22 10:11 AM   Result Value Ref Range    Pro-BNP 3,726 (H) <300 pg/mL    BNP Interpretation NOT REPORTED    Protime-INR    Collection Time: 02/06/22 10:11 AM   Result Value Ref Range    Protime 16.0 (H) 11.8 - 14.6 sec    INR 1.3    APTT    Collection Time: 02/06/22 10:11 AM   Result Value Ref Range    PTT 34.9 24.0 - 36.0 sec   EKG 12 Lead    Collection Time: 02/06/22 10:25 AM   Result Value Ref Range    Ventricular Rate 76 BPM    Atrial Rate 76 BPM    P-R Interval 144 ms    QRS Duration 90 ms    Q-T Interval 390 ms    QTc Calculation (Bazett) 438 ms    P Axis 55 degrees    R Axis -5 degrees    T Axis -5 degrees   POC Glucose Fingerstick    Collection Time: 02/06/22 11:32 PM   Result Value Ref Range    POC Glucose 101 65 - 105 mg/dL   Urinalysis Reflex to Culture    Collection Time: 02/07/22  2:42 AM    Specimen: Urine, clean catch   Result Value Ref Range    Color, UA Yellow Yellow    Turbidity UA Clear Clear    Glucose, Ur NEGATIVE NEGATIVE    Bilirubin Urine NEGATIVE NEGATIVE    Ketones, Urine NEGATIVE NEGATIVE    Specific [place], UA 1.011 1.000 - 1.030    Urine Hgb MODERATE (A) NEGATIVE    pH, UA 6.0 5.0 - 8.0    Protein, UA 3+ (A) NEGATIVE    Urobilinogen, Urine Normal Normal    Nitrite, Urine NEGATIVE NEGATIVE    Leukocyte Esterase, Urine NEGATIVE NEGATIVE    Urinalysis Comments NOT REPORTED    Microscopic Urinalysis    Collection Time: 02/07/22  2:42 AM   Result Value Ref Range    -          WBC, UA 5 TO 10 /HPF    RBC, UA 50  /HPF    Casts UA NOT REPORTED /LPF    Crystals, UA NOT REPORTED None /HPF    Epithelial Cells UA 0 TO 2 /HPF    Renal Epithelial, UA NOT REPORTED 0 /HPF    Bacteria, UA MODERATE (A) None    Mucus, UA NOT REPORTED None    Trichomonas, UA NOT REPORTED None    Amorphous, UA NOT REPORTED None    Other Observations UA NOT REPORTED NOT REQ. Yeast, UA NOT REPORTED None   Basic Metabolic Panel w/ Reflex to MG    Collection Time: 02/07/22  6:23 AM   Result Value Ref Range    Glucose 110 (H) 70 - 99 mg/dL    BUN 42 (H) 8 - 23 mg/dL    CREATININE 1.34 (H) 0.50 - 0.90 mg/dL    Bun/Cre Ratio NOT REPORTED 9 - 20    Calcium 8.8 8.6 - 10.4 mg/dL    Sodium 146 (H) 135 - 144 mmol/L    Potassium 4.3 3.7 - 5.3 mmol/L    Chloride 109 (H) 98 - 107 mmol/L    CO2 29 20 - 31 mmol/L    Anion Gap 8 (L) 9 - 17 mmol/L    GFR Non-African American 39 (L) >60 mL/min    GFR  47 (L) >60 mL/min    GFR Comment          GFR Staging NOT REPORTED    CBC    Collection Time: 02/07/22  6:23 AM   Result Value Ref Range    WBC 7.5 3.5 - 11.0 k/uL    RBC 3.67 (L) 4.0 - 5.2 m/uL    Hemoglobin 10.3 (L) 12.0 - 16.0 g/dL    Hematocrit 33.0 (L) 36 - 46 %    MCV 89.9 80 - 100 fL    MCH 28.2 26 - 34 pg    MCHC 31.3 31 - 37 g/dL    RDW 17.0 (H) 11.5 - 14.9 %    Platelets 612 510 - 164 k/uL    MPV 8.1 6.0 - 12.0 fL    NRBC Automated NOT REPORTED per 100 WBC   POC Glucose Fingerstick    Collection Time: 02/07/22  6:56 AM   Result Value Ref Range    POC Glucose 80 65 - 105 mg/dL         Lab Results   Component Value Date/Time    SPECIAL NOT REPORTED 06/11/2021 04:31 PM     Lab Results   Component Value Date/Time    CULTURE KLEBSIELLA PNEUMONIAE >[id number] CFU/ML (A) 06/11/2021 04:31 PM         Radiology:    XR CHEST PORTABLE    Result Date: 2/6/2022  Findings suggesting likely CHF as described. Physical Examination:        Physical Exam  Constitutional:       General: She is not in acute distress. Appearance: Normal appearance. HENT:      Head: Normocephalic and atraumatic. Nose: Nose normal.   Eyes:      General:         Right eye: No discharge. Left eye: No discharge. Conjunctiva/sclera: Conjunctivae normal.   Cardiovascular:      Rate and Rhythm: Normal rate. Pulmonary:      Breath sounds: No wheezing or rhonchi.       Comments: Decreased aeration,  Mild vascular congestion at lung bases  Abdominal:      General: Bowel sounds are normal. There is no distension. Palpations: Abdomen is soft. Tenderness: There is no abdominal tenderness. There is no guarding. Musculoskeletal:         General: Tenderness present. Right lower leg: Edema present. Left lower leg: Edema present. Skin:     General: Skin is warm and dry. Findings: Erythema present. Neurological:      Mental Status: She is alert. Mental status is at baseline. Psychiatric:         Mood and Affect: Mood normal.         Behavior: Behavior normal.           Assessment:        Primary Problem  Acute on chronic combined systolic (congestive) and diastolic (congestive) heart failure [facility])    Active Hospital Problems    Diagnosis Date Noted    Acute on chronic combined systolic (congestive) and diastolic (congestive) heart failure (HCC) [I50.43] 02/06/2022    Chronic a-fib ([facility] [I48.20] 02/06/2022    HTN. Benign hypertension with CKD (chronic kidney disease) stage III ([facility] [address] [I12.9, N18.30] 02/20/2020    Impaired hearing [H91.90] 02/20/2020    Gout with tophi [M1A.9XX1] 02/20/2020    COPD (chronic obstructive pulmonary disease) ([facility] [J44.9] 02/20/2020    Type 2 diabetes mellitus with stage 3 chronic kidney disease, without long-term current use of insulin ([facility]) [E11.22, N18.30]     Stage 3 chronic kidney disease ([facility] [address] [N18.30]     Coronary artery disease involving native coronary artery of native heart without angina pectoris [I25.10]     Asthma [J45.909]        Plan:        Acute on chronic combined systolic and diastolic CHF  SOB x 1 week, increased past 2 days prior to admission  Initial proBNP 3726  Last echo done 2020 EF 45-50%  Repeat echo pending  IV Lasix 40 mg twice daily => switched to IV Bumex per nephro recommendation  Consult cardiology     Hypertension - improved  Hypertensive urgency on admission;    Was given hydralazine 10 mg IV once   Was given lisinopril 40 mg on admission  Urgency now resolved but BP remains elevated at 156/76  Continue home Amlodipine po 6 mg QD  Continue home Lopressor po 50 mg BID  Losartan po 25 mg started 2/7 per Nephro recommendation     COPD (on RA at baseline) & hx of asthma  Pt has not used inhalers in years  No wheezing on physical exam  Increased O2 needs likely 2/2 CHF exacerbation  Wean off oxygen as tolerated     Hx of CAD   Trop 34; near baseline  Repeat Trop pending  EKG: Sinus rhythm rate of 76, normal axis, normal intervals, no ST segment elevation or depression, nonspecific T wave change  On Plavix      History of atrial fibrillation  EKG: Normal sinus rhythm  On Eliquis     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 Aide Sanches as outpatient,   Inpatient nephrology consulted, recommend   Stanford catheter   IV Bumex 2 mg twice daily   Metoprolol p.o. 50 mg twice daily for BP control   Losartan p.o. 25 mg daily     Diabetes Mellitus type 2  11/2021 hemoglobin A1c 5.8  POCT every 4 hours  Low-dose insulin sliding scale  Hypoglycemia protocol     Gout  Continue home allopurinol oral 300 mg daily     Chronic venous stasis with ulcerations   Consult wound care       Code: FULL  DVT prophylaxis: on Lovenox  GI prophylaxis: none  PT OT evaluation  SW discharge planning    Please note: Use of a speech recognition software was used in the creation of portions of this note and dictation errors, including those of syntax and sound alike word substitutions, may have escaped proofreading. Paradise Ballard DO  2/7/2022  7:18 AM       Attending Physician Statement  I have discussed the care of [address] [place] [address] Drive and I have examined the patient myselft and taken ros and hpi , including pertinent history and exam findings,  with the resident.  I have reviewed the key elements of all parts of the encounter with the resident. I agree with the assessment, plan and orders as documented by the resident.       Electronically signed by Blake Chaudhry MD

## 2022-02-07 NOTE — PROGRESS NOTES
Nephrology Progress Note    Subjective/   66y.o. year old female who we are seeing in consultation for Fluid overload and renal insufficiency    Interval History:  Patient's shortness of breath slightly better today. She states she was able to walk more than 200 feet. Echocardiogram shows preserved EF of 55%. Moderate LVH. No significant pericardial effusion. She received a dose of lisinopril 40 mg p.o. yesterday with no adverse reaction reported  Patient remains on IV Bumex 2 mg every 12 hourly    History of present illness: This is a 66 y.o. female with a significant past medical history of Failure with reduced ejection fraction [HFrEF with LVEF 45 to 50% in July 2020 secondary to ischemic cardiomyopathy], Coronary artery disease, Meningioma, type 2 diabetes mellitus [with nonproliferative diabetic retinopathy], essential hypertension, bilateral deafness and chronic kidney disease stage III [baseline serum creatinine 1.6 mg/dL and follows up with Dr. Tj Sarmiento of renal services of [facility] whom she saw 1 week ago and was switched from Lasix to Bumex], presented with complaints of dizziness, increasing shortness of breath and leg swelling. Review of serologic work-up including YRIS, ANCA, complements and protein electrophoresis were essentially normal or negative. Chest x-ray performed at presentation showed pulmonary vascular congestion. She was previously on lisinopril and Aldactone and both are currently being held.  Diuretic regimen most recently consists of Bumex 0.5 mg p.o. daily       Objective/     Vitals:    02/06/22 2338 02/07/22 0732 02/07/22 0925 02/07/22 1215   BP: (!) 159/57 (!) 156/57  (!) 160/60   Pulse: 95 85  88   Resp: 18 18  18   Temp: 97.7 °F (36.5 °C) 97.5 °F (36.4 °C)  97.8 °F (36.6 °C)   TempSrc: Oral Oral  Oral   SpO2: 96% 97% 95% 98%   Weight: 153 lb (69.4 kg)      Height:         24HR INTAKE/OUTPUT:      Intake/Output Summary (Last 24 hours) at 2/7/2022 1435  Last data filed at 2/7/2022 0622  Gross per 24 hour   Intake 240 ml   Output 550 ml   Net -310 ml     Patient Vitals for the past 96 hrs (Last 3 readings):   Weight   02/06/22 2338 153 lb (69.4 kg)   02/06/22 0933 134 lb (60.8 kg)       Constitutional:  Alert, awake, no apparent distress  Cardiovascular:  S1, S2 without m/r/g  Respiratory: Diminished air entry no rales auscultated. Abdomen: +bs, soft, nt  Ext: 2+ LE edema    Data/  Recent Labs     02/06/22  1011 02/07/22  0623   WBC 7.2 7.5   HGB 11.0* 10.3*   HCT 34.8* 33.0*   MCV 89.5 89.9    275     Recent Labs     02/06/22  1011 02/07/22 0623    146*   K 4.6 4.3    109*   CO2 27 29   GLUCOSE 149* 110*   BUN 46* 42*   CREATININE 1.47* 1.34*   LABGLOM 35* 39*   GFRAA 42* 47*     Normal left ventricle size and function with an estimated EF > 55%. No segmental wall motion abnormalities seen. Moderate left ventricular hypertrophy. Normal right ventricular size and function. Left atrial dilatation. Aortic valve is trileaflet. Mild aortic stenosis. Mild-moderate aortic insufficiency. Normal aortic root dimension. Mitral annular calcification is seen. Mild mitral regurgitation. Mild tricuspid regurgitation. Normal right ventricular systolic pressure. No significant pericardial effusion is seen. Pleural effusion present. Assessment/   1. Generalized edema - This may be secondary to nephrotic syndrome or decompensated heart failure with reduced ejection fraction. Patient will benefit from aggressive diuretic regimen. Plan: continue  IV Bumex 2 mg twice daily. Daily weights. Indwelling Stanford catheter to monitor strict I's and O's given high-dose diuretic     2. Nephrotic range proteinuria [urine protein/creatinine ratio 20 g/g] - this may be secondary to diabetic nephropathy but primary glomerular disorders would need to be excluded. Patient has apparently consistently refused kidney biopsy.     3.   Systemic hypertension - blood pressure control is suboptimal at this time. May be related to plasma volume expansion- place patient on metoprolol 50 mg p.o. twice daily and monitor blood pressure response -Check renin and aldosterone a.m.     4.  Bilateral leg cellulitis -      5. Chronic kidney disease stage III - most consistent with diabetic glomerulopathy given associated proteinuria and retinopathy. Patient does not have evidence of  angioedema or other complications of ACE inhibitor's.   Losartan 25 mg p.o. daily has been added-Will increase dose to 50 mg if renal function and potassium remains stable         Barbara Hicks MD

## 2022-02-07 NOTE — DISCHARGE INSTR - COC
Continuity of Care Form    Patient Name: Freddy Crow   :    MRN:  [id number]    Admit date:  2022  Discharge date:  ***    Code Status Order: Full Code   Advance Directives:      Admitting Physician:  Colonel Sara MD  PCP: Chelly Harrison MD    Discharging Nurse: [facility] Unit/Room#:   Discharging Unit Phone Number: ***    Emergency Contact:   Extended Emergency Contact Information  Primary Emergency Contact: Luis[address] Phone: [phone]  Relation: Child  Secondary Emergency Contact: CYRIL NELSON  Mobile Phone: [phone]  Relation: Child  Preferred language: [place]    Past Surgical History:  Past Surgical History:   Procedure Laterality Date    CORONARY ARTERY BYPASS GRAFT      x 3, Dr. Nancie Starkey         Immunization History: There is no immunization history on file for this patient. Active Problems:  Patient Active Problem List   Diagnosis Code    Vitamin D deficiency E55.9    Venous insufficiency of both lower extremities I87.2    Asthma J45.909    Type 2 diabetes mellitus with stage 3 chronic kidney disease, without long-term current use of insulin ([facility]) E11.22, N18.30    Coronary artery disease involving native coronary artery of native heart without angina pectoris I25.10    Iron deficiency anemia D50.9    Stage 3 chronic kidney disease ([facility]) N18.30    Colonoscopy refused Z53.20    Pneumococcal vaccination declined Z28.21    Impaired hearing H91.90    Chronic diastolic CHF (congestive heart failure) ([facility]) I50.32    COPD (chronic obstructive pulmonary disease) ([facility]) J44.9    HTN. Benign hypertension with CKD (chronic kidney disease) stage III ([facility]) I12.9, N18.30    Gout with tophi M1A. 9XX1    Hyperlipidemia with target LDL less than 70 E78.5    Dry skin dermatitis HANDS L85.3    Meningioma, cerebral ([facility]) D32.0    Paroxysmal atrial fibrillation ([facility]) I48.0    Influenza vaccination declined Z28.21    Recurrent UTI N39.0    Hypomagnesemia E83.42    Kidney stones N20.0    Diverticulosis K57.90    COVID-19 vaccination declined Z28.21    JOY (acute kidney injury) ([facility] [address] N17.9    Acute on chronic combined systolic (congestive) and diastolic (congestive) heart failure (HCC) I50.43    Chronic a-fib (HCC) I48.20       Isolation/Infection:   Isolation            No Isolation          Patient Infection Status       Infection Onset Added Last Indicated Last Indicated By Review Planned Expiration Resolved Resolved By    None active    Resolved    COVID-19 (Rule Out) 02/06/22 02/06/22 02/06/22 COVID-19 & Influenza Combo (Ordered)   02/06/22 Rule-Out Test Resulted    COVID-19 (Rule Out) 07/25/20 07/25/20 07/25/20 COVID-19 (Ordered)   07/25/20 Rule-Out Test Resulted            Nurse Assessment:  Last Vital Signs: BP (!) 156/57   Pulse 85   Temp 97.5 °F (36.4 °C) (Oral)   Resp 18   Ht 4' 11\" (1.499 m)   Wt 153 lb (69.4 kg)   SpO2 97%   BMI 30.90 kg/m²     Last documented pain score (0-10 scale): Pain Level: 0  Last Weight:   Wt Readings from Last 1 Encounters:   02/06/22 153 lb (69.4 kg)     Mental Status:  {IP PT MENTAL STATUS:22772}    IV Access:  {[facility] ACCESS:219072665}    Nursing Mobility/ADLs:  Walking   {[facility] NYLK:014872064}  Transfer  {[facility] RLET:329248568}  Bathing  {[facility] KPGC:595748657}  Dressing  {[facility] EPIN:434500537}  Toileting  {[facility] HMQZ:575057207}  Feeding  {[facility] HFAF:164779181}  Med Admin  {[facility] NCQL:152463898}  Med Delivery   {[facility] MED Delivery:913375894}    Wound Care Documentation and Therapy:      -BLE: Chronic venous stasis ulceration with recidivism.  No open wounds currently.     -Education was provided on the importance of CHF mgmt- internal medicine/PCP may consider CHF clinic referral. Also provided education on importance of leg elevation, sleeping in a bed at night, and compression therapy.      -Encourage compression: apply silicone cream (clue tube), wrap daily with kerlix/moderate to loose ace wrap from toes to knees. Elimination:  Continence: Bowel: {YES / BU:61727}  Bladder: {YES / ZM:64008}  Urinary Catheter: {Urinary Catheter:306452790}   Colostomy/Ileostomy/Ileal Conduit: {YES / CV:}       Date of Last BM: ***    Intake/Output Summary (Last 24 hours) at 2022 1144  Last data filed at 2022 0622  Gross per 24 hour   Intake 420 ml   Output 550 ml   Net -130 ml     I/O last 3 completed shifts:   In: 5 [P.O.:420]  Out: 550 [Urine:550]    Safety Concerns:     [address] Safety Concerns:880982643}    Impairments/Disabilities:      [address] Impairments/Disabilities:347581903}    Nutrition Therapy:  Current Nutrition Therapy:   [address] Diet List:095418457}    Routes of Feeding: {CHP DME Other Feedings:254793851}  Liquids: {Slp liquid thickness:83928}  Daily Fluid Restriction: {CHP DME Yes amt example:287680156}  Last Modified Barium Swallow with Video (Video Swallowing Test): {Done Not Done AFON:246547598}    Treatments at the Time of Hospital Discharge:   Respiratory Treatments: ***  Oxygen Therapy:  {Therapy; copd oxygen:86202}  Ventilator:    { CC Vent MGMW:704620117}    Rehab Therapies: {THERAPEUTIC INTERVENTION:4012688470}  Weight Bearing Status/Restrictions: [address]  Weight Bearin}  Other Medical Equipment (for information only, NOT a DME order):  {EQUIPMENT:179356607}  Other Treatments: ***    Patient's personal belongings (please select all that are sent with patient):  {CHP DME Belongings:593289894}    RN SIGNATURE:  {Esignature:828449639}    CASE MANAGEMENT/SOCIAL WORK SECTION    Inpatient Status Date: ***    Readmission Risk Assessment Score:  Readmission Risk              Risk of Unplanned Readmission:  20           Discharging to Facility/ Agency   Name:   Address:  Phone:  Fax:    Dialysis Facility (if applicable)   Name:  Address:  Dialysis Schedule:  Phone:  Fax:    / signature: {Esignature:726670877}    PHYSICIAN SECTION    Prognosis: {Prognosis:6306478020}    Condition at Discharge: Rosaura Moreno Patient Condition:038947038}    Rehab Potential (if transferring to Rehab): {Prognosis:8567970061}    Recommended Labs or Other Treatments After Discharge: ***    Physician Certification: I certify the above information and transfer of Derek Rosales  is necessary for the continuing treatment of the diagnosis listed and that she requires {Admit to Appropriate Level of Care:64463} for {GREATER/LESS:993256618} 30 days.      Update Admission H&P: {CHP DME Changes in RVYUQ:281591616}    PHYSICIAN SIGNATURE:  {Esignature:547269367}

## 2022-02-07 NOTE — CONSULTS
[facility] Nurse  Consult Note       NAME:  Christel Ojeda  MEDICAL RECORD NUMBER:  [id number]  AGE: 66 y.o. GENDER: female  : 1950  TODAY'S DATE:  2022    Subjective:      Bessy Carroll is a 66 y.o. female with inpatient referral to [facility] Ost[facility] Contin[facility] for:  BLE      Wound Identification:  Wound Type: venous- no open wounds currently  Contributing Factors: edema, venous stasis, diabetes and obesity    Wound History: the patient states that this is a chronic problem. She sits in a chair most of the day and even sleeps in a chair at night due to orthopnea. She does typically wear compression socks but does not wear them when her legs start to blister/weep to avoid soiling them.      Current Wound Care Treatment: Open to air    Patient Goal of Care:  [x] Wound Healing  [] Odor Control  [] Palliative Care  [] Pain Control   [] Other:         PAST MEDICAL HISTORY        Diagnosis Date    Acute CVA (cerebrovascular accident) ([facility] [address] 2020    Altered mental status 2021    Arthritis     Brain mass     Cellulitis and abscess     Cellulitis and abscess of unspecified site     Cellulitis of both lower extremities 2021    Cellulitis of left lower extremity 2020    Cellulitis of lower extremity 10/4/2020    CHF (congestive heart failure) (HCC)     Chronic kidney disease, unspecified     Coronary atherosclerosis of native coronary artery     Essential hypertension 2020    Essential hypertension, malignant     Hallucinations 2021    Hard of hearing     Head contusion 2020    Hypokalemia 2020    Iron deficiency anemia, unspecified     Meningioma ([address] 2021    Myocardial infarction ([facility] [address]     Other and unspecified hyperlipidemia     Pure hypercholesterolemia     Toxic metabolic encephalopathy 3/60/7949    Type II or unspecified type diabetes mellitus without mention of complication, not stated as uncontrolled     Unspecified asthma(493.90)     Unspecified venous (peripheral) insufficiency     Unspecified vitamin D deficiency     UTI (urinary tract infection) 2021       PAST SURGICAL HISTORY    Past Surgical History:   Procedure Laterality Date    CORONARY ARTERY BYPASS GRAFT      x 3, Dr. Deepthi Browne History   Problem Relation Age of Onset    Diabetes Mother     Heart Disease Mother     Heart Disease Father     Diabetes Sister     High Blood Pressure Sister     Diabetes Maternal Grandmother        SOCIAL HISTORY    Social History     Tobacco Use    Smoking status: Former Smoker     Packs/day: 0.25     Years: 10.00     Pack years: 2.50     Quit date: 2000     Years since quittin.1    Smokeless tobacco: Never Used   Vaping Use    Vaping Use: Never used   Substance Use Topics    Alcohol use: Not Currently     Alcohol/week: 0.0 standard drinks    Drug use: No         ALLERGIES    Allergies   Allergen Reactions    Latex Rash    Aleve [Naproxen Sodium]      Chronic kidney disease stage III, CHF    Pioglitazone Other (See Comments)     Congestive heart failure    Claritin [Loratadine]     Keflex [Cephalexin]     Lisinopril      Needs clarification of contraindication       HOME MEDICATIONS  Prior to Admission medications    Medication Sig Start Date End Date Taking?  Authorizing Provider   miconazole (MICOTIN) 2 % powder Apply topically 2 times daily UNDER THE SKIN FOLDS LONG TERM 22  Yes Jim Fox MD   magnesium oxide (MAG-OX) 400 (241.3 Mg) MG TABS tablet Take 1 tablet by mouth twice daily 21  Yes Manuel Leyva MD   atorvastatin (LIPITOR) 40 MG tablet Take 1 tablet by mouth once daily 21  Yes Jim Fox MD   desloratadine (CLARINEX) 5 MG tablet Take 1 tablet by mouth once daily 11/10/21  Yes Jim Fox MD   amLODIPine (NORVASC) 5 MG tablet Take 1 tablet by mouth daily 10/15/21  Yes Chelly Harrison MD   vitamin D (ERGOCALCIFEROL) 1.25 MG (24707 UT) CAPS capsule Take 1 capsule by mouth once a week 10/12/21  Yes Jazz Bass MD   clopidogrel (PLAVIX) 75 MG tablet Take 1 tablet by mouth daily 10/12/21  Yes Chelly Harrison MD   colchicine (COLCRYS) 0.6 MG tablet Take 0.6 mg by mouth daily 8/30/21  Yes Historical Provider, MD   metoprolol tartrate (LOPRESSOR) 50 MG tablet Take 1 tablet by mouth 2 times daily 6/14/21  Yes Chelly Harrison MD   sodium bicarbonate 650 MG tablet Take 1 tablet by mouth 3 times daily 5/18/21 5/18/22 Yes Taisha Alvarado MD   allopurinol (ZYLOPRIM) 300 MG tablet Take 1 tablet by mouth daily Per rheumatologist 3/19/21  Yes Chelly Harrison MD   apixaban (ELIQUIS) 5 MG TABS tablet Take 1 tablet by mouth 2 times daily 7/31/20  Yes MAL Crespo - CNP   Multiple Vitamins-Minerals (THERAPEUTIC MULTIVITAMIN-MINERALS) tablet Take 1 tablet by mouth daily   Yes Historical Provider, MD   bumetanide (BUMEX) 0.5 MG tablet Take 1 tablet by mouth daily 1/25/22   Taisha Alvarado MD   chlorhexidine (HIBICLENS) 4 % external liquid for decontamination AND [address] LEGS EVERY DAY 1/7/22   Chelly Harrison MD   Bismuth Tribromoph-Petrolatum (XEROFORM PETROLATUM PATCH 2\"X2\") PADS external pads Apply 1 each topically daily 12/10/21   MAL Moreau - CNP   blood glucose test strips (FREESTYLE LITE) strip 1 each by In Vitro route daily As needed.  3/19/21   Chelly Harrison MD   FreeStyle Lancets MISC 1 each by Does not apply route daily Patient needs to contact office before any further refills will be approved 3/19/21   Chelly Harrison MD   albuterol sulfate HFA (PROAIR HFA) 108 (90 Base) MCG/ACT inhaler Inhale 2 puffs into the lungs every 6 hours as needed for Wheezing or Shortness of Breath (cough)  Patient not taking: Reported on 1/7/2022 3/12/21   Chelly Harrison MD   Blood Pressure KIT 1 kit by Does not apply route three times daily 12/19/19   Crista Paniagua MD   blood glucose monitor kit and supplies 1 kit by Other route three times daily Dispense Butterfly Elite CBG Device 8/20/19   Deven Adams MD       CURRENT MEDICATIONS:  Current Facility-Administered Medications   Medication Dose Route Frequency Provider Last Rate Last Admin    perflutren lipid microspheres (DEFINITY) injection 2.2 mg  2 mL IntraVENous ONCE PRN Jann Reyezo, DO        sodium chloride flush 0.9 % injection 5-40 mL  5-40 mL IntraVENous 2 times per day Crista Paniagua MD   10 mL at 02/07/22 0825    sodium chloride flush 0.9 % injection 5-40 mL  5-40 mL IntraVENous PRN Crista Paniagua MD        0.9 % sodium chloride infusion  25 mL IntraVENous PRN Crista Paniagua MD        ondansetron (ZOFRAN-ODT) disintegrating tablet 4 mg  4 mg Oral Q8H PRN Crista Paniagua MD        Or    ondansetron [facility]) injection 4 mg  4 mg IntraVENous Q6H PRN Crista Paniagua MD        polyethylene glycol (GLYCOLAX) packet 17 g  17 g Oral Daily PRN Crista Paniagua MD        acetaminophen (TYLENOL) tablet 650 mg  650 mg Oral Q6H PRN Crista Paniagua MD   650 mg at 02/07/22 0319    Or    acetaminophen (TYLENOL) suppository 650 mg  650 mg Rectal Q6H PRN Crista Paniagua MD        amLODIPine (NORVASC) tablet 5 mg  5 mg Oral Daily Yoselin Chavez MD   5 mg at 02/07/22 0421    apixaban (ELIQUIS) tablet 5 mg  5 mg Oral BID Yoselin Chavez MD   5 mg at 02/07/22 4413    metoprolol tartrate (LOPRESSOR) tablet 50 mg  50 mg Oral BID Yoselin Chavez MD   50 mg at 02/07/22 2877    albuterol sulfate  (90 Base) MCG/ACT inhaler 2 puff  2 puff Inhalation Q4H PRN Mario Wallace MD        allopurinol (ZYLOPRIM) tablet 300 mg  300 mg Oral Daily Mario Wallace MD   300 mg at 02/07/22 5867    atorvastatin (LIPITOR) tablet 40 mg  40 mg Oral Daily Mario Wallace MD   40 mg at 02/07/22 9008    clopidogrel (PLAVIX) tablet 75 mg  75 mg Oral Daily Mario Wallace MD   75 mg at 02/07/22 6284    sodium bicarbonate tablet 650 mg  650 mg Oral TID Kenney Ramsey MD   650 mg at 02/07/22 0159    insulin lispro (HUMALOG) injection vial 0-6 Units  0-6 Units SubCUTAneous TID  Shante Hayward,         insulin lispro (HUMALOG) injection vial 0-3 Units  0-3 Units SubCUTAneous Nightly Shante Hayward, DO        glucose (GLUTOSE) 40 % oral gel 15 g  15 g Oral PRN Corrie Needle, DO        dextrose 50 % IV solution  12.5 g IntraVENous PRN Corrie Needle, DO        glucagon (rDNA) injection 1 mg  1 mg IntraMUSCular PRN Corrie Needle, DO        dextrose 5 % solution  100 mL/hr IntraVENous PRN Corrie Needle, DO        diphenhydrAMINE (BENADRYL) injection 25 mg  25 mg IntraVENous Nightly PRN Jefferson Toscano MD   25 mg at 02/06/22 2340    bumetanide (BUMEX) injection 2 mg  2 mg IntraVENous BID Dilip Monahan MD   2 mg at 02/07/22 1040    losartan (COZAAR) tablet 25 mg  25 mg Oral Daily Dilip Monahan MD   25 mg at 02/07/22 9747       Review of Systems      Objective:      BP (!) 156/57   Pulse 85   Temp 97.5 °F (36.4 °C)   Resp 18   Ht 4' 11\" (1.499 m)   Wt 153 lb (69.4 kg)   SpO2 97%   BMI 30.90 kg/m²       LABS    CBC:   Lab Results   Component Value Date    WBC 7.5 02/07/2022    RBC 3.67 02/07/2022    HGB 10.3 02/07/2022     SED RATE:   Lab Results   Component Value Date    SEDRATE 7 10/17/2017       CMP:  Albumin:    Lab Results   Component Value Date    LABALBU 3.3 02/06/2022     PT/INR:    Lab Results   Component Value Date    PROTIME 16.0 02/06/2022    INR 1.3 02/06/2022     HgBA1c:    Lab Results   Component Value Date    LABA1C 5.8 11/24/2021     PTT: No components found for: LABPTT      Assessment:     Physical Exam      Jackson Risk Score: Jackson Scale Score: 19    Patient Active Problem List   Diagnosis Code    Vitamin D deficiency E55.9    Venous insufficiency of both lower extremities I87.2    Asthma J45.909    Type 2 diabetes mellitus with stage 3 chronic kidney disease, without long-term current use of insulin ([facility]) E11.22, N18.30    Coronary artery disease involving native coronary artery of native heart without angina pectoris I25.10    Iron deficiency anemia D50.9    Stage 3 chronic kidney disease ([facility]) N18.30    Colonoscopy refused Z53.20    Pneumococcal vaccination declined Z28.21    Impaired hearing H91.90    Chronic diastolic CHF (congestive heart failure) ([facility]) I50.32    COPD (chronic obstructive pulmonary disease) ([facility]) J44.9    HTN. Benign hypertension with CKD (chronic kidney disease) stage III ([facility]) I12.9, N18.30    Gout with tophi M1A. 9XX1    Hyperlipidemia with target LDL less than 70 E78.5    Dry skin dermatitis HANDS L85.3    Meningioma, cerebral ([facility]) D32.0    Paroxysmal atrial fibrillation ([facility]) I48.0    Influenza vaccination declined Z28.21    Recurrent UTI N39.0    Hypomagnesemia E83.42    Kidney stones N20.0    Diverticulosis K57.90    COVID-19 vaccination declined Z28.21    JOY (acute kidney injury) ([facility] [address] N17.9    Acute on chronic combined systolic (congestive) and diastolic (congestive) heart failure ([facility]) I50.43    Chronic a-fib ([facility]) I48.20         Measurements:     Media Information             WOUND DESCRIPTION:   There does not appear to be any open wounds on the legs at this time. The patient states that there were just blisters there yesterday. I found no weeping, no open areas currently. The legs do have the appearance of recent swelling reduction. She appears to have chronic venous stasis dermatitis. The erythema/hemosiderin staining is chronic in nature with no signs of infection present. Response to treatment:  Well tolerated by patient. Plan:     Plan of Care:     -BLE: Chronic venous stasis ulceration with recidivism.  No open wounds currently.    -Education was provided on the importance of CHF mgmt- internal medicine/PCP may consider CHF clinic referral. Also provided education on importance of leg elevation, sleeping in a bed at night, and compression therapy.     -Encourage compression: apply silicone cream (clue tube), wrap daily with kerlix/moderate to loose ace wrap from toes to knees. -WOC nursing will sign off case. Please call or reconsult if further needs arise.       Patient/Caregiver Teaching:  Level of patientunderstanding able to:     [x] Indicates understanding       [x] Needs reinforcement  [] Unsuccessful      [] Verbal Understanding  [] Demonstrated understanding       [] No evidence of learning  [] Refused teaching         [] N/A       Electronically signed by Js Bowman RN on  2/7/2022 at 11:34 AM

## 2022-02-07 NOTE — PROGRESS NOTES
This RN successfully inserted indwelling smith catheter at 02:30. UA sent to lab per protocol. Electronically signed by Santana Lo RN.

## 2022-02-07 NOTE — PROGRESS NOTES
Plan  Times per week: 5-6x/wk  Current Treatment Recommendations: Balance Training,Functional Mobility Training,Transfer Training,Gait Training,ADL/Self-care [facility] [employer]  Safety Devices  Type of devices: Call light within reach,Left in bed,Patient at risk for falls        02/07/22 1523   PT Individual Minutes   Time In 1020   Time Out 1058   Minutes 38     Electronically signed by Bonifacio Diaz PTA on 2/7/22 at 3:31 PM EST

## 2022-02-07 NOTE — PROGRESS NOTES
Patient's daughter called and spoke with this RN for an update on their mother. Patient has been resting comfortably without distress and has the smith catheter that was discussed with Dr. Mansoor Luke at the bedside. RN to call daughter if anything changes overnight. RN will continue to monitor. Electronically signed by Anabel Denver, RN.

## 2022-02-07 NOTE — CONSULTS
[facility]  CONSULT NOTE                  Date:   2/7/2022  Patient name: Reynaldo Damon  Date of admission:  2/6/2022  9:32 AM  MRN:   [id number]  YOB: 1950    Reason for Admission: CHF     CHIEF COMPLAINT:   shortness of breath and edema     History Obtained From:  Patient and chart review     HISTORY OF PRESENT ILLNESS:      79-year-old female with known history of ischemic heart disease status post CABG in 2003, only patent LIMA to LAD and SVG to RCA on repeat angiogram in 2016 along with mild ischemic cardiomyopathy, last LVEF 45 to 50% in 2020, paroxysmal atrial fibrillation status post KARLA guided cardioversion in July 2020 and chronic kidney disease admitted with worsening dyspnea on exertion and bilateral lower extremity edema. No fever, URI symptoms or cough. Denies any chest pain chest x-ray shows pulmonary edema. Started on IV Bumex 2 mg twice daily based on nephrology recommendations with significant improvement in her symptoms. This morning she is walking in the hallway with no apparent distress. Remains in normal sinus rhythm. Maintained on Eliquis at home with no bleeding episodes. She does have bilateral lower extremity erythema and started on IV clindamycin for possible cellulitis.       Past Medical History:   has a past medical history of Acute CVA (cerebrovascular accident) ([address] Altered mental status, Arthritis, Brain mass, Cellulitis and abscess, Cellulitis and abscess of unspecified site, Cellulitis of both lower extremities, Cellulitis of left lower extremity, Cellulitis of lower extremity, CHF (congestive heart failure) ([address] Chronic kidney disease, unspecified, Coronary atherosclerosis of native coronary artery, Essential hypertension, Essential hypertension, malignant, Hallucinations, Hard of hearing, Head contusion, Hypokalemia, Iron deficiency anemia, unspecified, Meningioma ([address] Myocardial infarction ([address] Other and unspecified hyperlipidemia, Pure hypercholesterolemia, Toxic metabolic encephalopathy, Type II or unspecified type diabetes mellitus without mention of complication, not stated as uncontrolled, Unspecified asthma(493.90), Unspecified venous (peripheral) insufficiency, Unspecified vitamin D deficiency, and UTI (urinary tract infection). Past Surgical History:   has a past surgical history that includes Tonsillectomy and adenoidectomy and Coronary artery bypass graft. Home Medications:    Prior to Admission medications    Medication Sig Start Date End Date Taking?  Authorizing Provider   miconazole (MICOTIN) 2 % powder Apply topically 2 times daily UNDER THE SKIN FOLDS LONG TERM 1/7/22  Yes Ortega Garcia MD   magnesium oxide (MAG-OX) 400 (241.3 Mg) MG TABS tablet Take 1 tablet by mouth twice daily 12/21/21  Yes [facility], MD   atorvastatin (LIPITOR) 40 MG tablet Take 1 tablet by mouth once daily 12/20/21  Yes Ortega Garcia MD   desloratadine (CLARINEX) 5 MG tablet Take 1 tablet by mouth once daily 11/10/21  Yes Ortega Garcia MD   amLODIPine (NORVASC) 5 MG tablet Take 1 tablet by mouth daily 10/15/21  Yes Ortega Garcia MD   vitamin D (ERGOCALCIFEROL) 1.25 MG (63650 UT) CAPS capsule Take 1 capsule by mouth once a week 10/12/21  Yes Lyla Bain MD   clopidogrel (PLAVIX) 75 MG tablet Take 1 tablet by mouth daily 10/12/21  Yes Ortega Garcia MD   colchicine (COLCRYS) 0.6 MG tablet Take 0.6 mg by mouth daily 8/30/21  Yes Historical Provider, MD   metoprolol tartrate (LOPRESSOR) 50 MG tablet Take 1 tablet by mouth 2 times daily 6/14/21  Yes Ortega Garica MD   sodium bicarbonate 650 MG tablet Take 1 tablet by mouth 3 times daily 5/18/21 5/18/22 Yes [facility], MD   allopurinol (ZYLOPRIM) 300 MG tablet Take 1 tablet by mouth daily Per rheumatologist 3/19/21  Yes Ortega Garcia MD   apixaban (ELIQUIS) 5 MG TABS tablet Take 1 tablet by mouth 2 times daily 7/31/20  Yes Edwin Hilton MAL Perry - CNP   Multiple Vitamins-Minerals (THERAPEUTIC MULTIVITAMIN-MINERALS) tablet Take 1 tablet by mouth daily   Yes Historical Provider, MD   bumetanide (BUMEX) 0.5 MG tablet Take 1 tablet by mouth daily 1/25/22   [facility], MD   chlorhexidine (HIBICLENS) 4 % external liquid for decontamination AND [address] LEGS EVERY DAY 1/7/22   Holley Petit MD   Bismuth Tribromoph-Petrolatum (XEROFORM PETROLATUM PATCH 2\"X2\") PADS external pads Apply 1 each topically daily 12/10/21   MAL Moreau - CNP   blood glucose test strips (FREESTYLE LITE) strip 1 each by In Vitro route daily As needed. 3/19/21   Holley Petit MD   FreeStyle Lancets MISC 1 each by Does not apply route daily Patient needs to contact office before any further refills will be approved 3/19/21   Holley Petit MD   albuterol sulfate HFA (PROAIR HFA) 108 (90 Base) MCG/ACT inhaler Inhale 2 puffs into the lungs every 6 hours as needed for Wheezing or Shortness of Breath (cough)  Patient not taking: Reported on 1/7/2022 3/12/21   Holley Petit MD   Blood Pressure KIT 1 kit by Does not apply route three times daily 12/19/19   David River MD   blood glucose monitor kit and supplies 1 kit by Other route three times daily Dispense Butterfly Elite CBG Device 8/20/19   Jazmin Shah MD       Allergies:  Latex, Aleve [naproxen sodium], Pioglitazone, Claritin [loratadine], Keflex [cephalexin], and Lisinopril    Social History:   reports that she quit smoking about 22 years ago. She has a 2.50 pack-year smoking history. She has never used smokeless tobacco. She reports previous alcohol use. She reports that she does not use drugs. Family History:   Negative for early CAD    REVIEW OF SYSTEMS:    · Constitutional: there has been decline in functional capacity for last 1 week due to shortness of breath  · Eyes: No visual changes or diplopia. · ENT: No Headaches, hearing loss or vertigo.  No mouth sores or sore throat. · Cardiovascular: Negative for chest pain as described above, positive for dyspnea on exertion, positive for orthopnea, positive for lower extremity edema and cellulitis. · Respiratory: No hx of productive cough, no upper respiratory tract infection symptoms pleuritic chest pain   · Gastrointestinal: No abdominal pain, appetite loss, blood in stools. No change in bowel habits. · Genitourinary: No dysuria, trouble voiding, or hematuria. · Musculoskeletal:  bilateral lower extremity weakness and redness  · Integumentary: No rash or pruritis. · Neurological: No headache, weakness, numbness or tingling. No change in gait, balance, coordination. · Psychiatric: No anxiety, or depression. · Endocrine: No temperature intolerance. No excessive thirst, fluid intake, or urination. No tremor. · Hematologic/Lymphatic: No abnormal bruising or bleeding, blood clots or swollen lymph nodes. · Allergic/Immunologic: No nasal congestion or hives. PHYSICAL EXAM:    Physical Examination:    BP (!) 156/57   Pulse 85   Temp 97.5 °F (36.4 °C)   Resp 18   Ht 4' 11\" (1.499 m)   Wt 153 lb (69.4 kg)   SpO2 97%   BMI 30.90 kg/m²    Constitutional and General Appearance: alert, cooperative, in no distress on 2 L nasal cannula oxygen  HEENT: PERRL, no cervical lymphadenopathy. Normal oral mucosa  Respiratory:  · Normal excursion and expansion without use of accessory muscles  · Resp Auscultation: Good respiratory effort. No for increased work of breathing. On auscultation: Reduced air entry at bases with bilateral inspiratory rales.   No wheezing  Cardiovascular:  · The apical impulse is not displaced  · Heart tones are crisp and normal. regular S1 and S2. Murmurs: None   · Jugular venous pulsation Normal  · Thre is no carotid bruit   · Peripheral pulses are symmetrical and full   Abdomen:  · No masses or tenderness  · Bowel sounds present  Extremities:  ·  No Cyanosis or Clubbing  ·  Lower extremity edema: No significant edema. Erythema and increased warmth noted in bilateral lower extremities. ·  Skin: Warm and dry  Neurological:  · Not done     DATA:    Diagnostics:      EKG 2/6/2022: Normal sinus rhythm, Moderate voltage criteria for LVH, Nonspecific ST abnormality      Previous cardiac testing:       CATH 7/21/06:   Patent LIMA to LAD and SVG to RCA. Occluded SVG to OM 2. Occluded RCA.      CABG with LIMA-LAD, SVG-OM and SVG-RCA in 2003. TTE/CV July 2020  Summary:   1. A KALRA was performed without complications. 2. Normal LV size with normal LVEF. 3. No thrombus or valvular vegetation identified  4. Moderate atheromatous disease noted in aortic arch     CARDIOVERSION:  After an adequate level of sedation was achieved, 200J in biphasic synchronized delivery was administered. conversion to normal sinus rhythm. The patient awoke without complications      Labs:     CBC:   Recent Labs     02/06/22  1011 02/07/22  0623   WBC 7.2 7.5   HGB 11.0* 10.3*   HCT 34.8* 33.0*    275     BMP:   Recent Labs     02/06/22  1011 02/07/22  0623    146*   K 4.6 4.3   CO2 27 29   BUN 46* 42*   CREATININE 1.47* 1.34*   LABGLOM 35* 39*   GLUCOSE 149* 110*     BNP: No results for input(s): BNP in the last 72 hours. PT/INR:   Recent Labs     02/06/22  1011   PROTIME 16.0*   INR 1.3     APTT:  Recent Labs     02/06/22  1011   APTT 34.9     CARDIAC ENZYMES:No results for input(s): CKTOTAL, CKMB, CKMBINDEX, TROPONINI in the last 72 hours.   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 Continue IV Bumex based on nephrology recommendation  2. Strict I's and O's and daily weights  3. Repeat echocardiogram to reassess LVEF and rule out any valvular heart disease  4. Continue Eliquis, Plavix, Lopressor and losartan      Discussed with patient and nursing.         Rio Hopper MD, [facility]

## 2022-02-07 NOTE — FLOWSHEET NOTE
02/06/22 1959   Provider Notification   Reason for Communication Review case   Provider Name Dr. Minor Hernandez   Provider Notification Physician   Method of Communication Call   Response See orders   Notification Time 2000     This RN called Dr. Minor Hernandez in regard to patient's anxiety and difficulty sleeping. New orders received; see MAR. Electronically signed by Kulwant Key RN.

## 2022-02-07 NOTE — FLOWSHEET NOTE
02/07/22 0153   Provider Notification   Reason for Communication Review case   Provider Name Dr. Barrie Vazquez   Provider Notification Physician   Method of Communication Page   Notification Time 0153     RN contacted Dr. Barrie Vazquez to clarify order for Smith catheter in note and mentioned in shift handoff. This RN to insert smith catheter for fluid volume management while diurising with Bumex. Electronically signed by Melissa Edwards RN.

## 2022-02-07 NOTE — PLAN OF CARE
Problem: Falls - Risk of:  Goal: Will remain free from falls  Description: Will remain free from falls  2/7/2022 1223 by Minesh Hogan RN  Outcome: Ongoing  2/7/2022 0408 by Kulwant Key RN  Outcome: Met This Shift

## 2022-02-07 NOTE — FLOWSHEET NOTE
Writer prayed loudly next to her ear as she said 'I am almost deaf.' Family welcomed prayer. 02/07/22 1532   Encounter Summary   Services provided to: Patient and family together   Referral/Consult From: Palliative Care   Continue Visiting   (2-7-22)   Complexity of Encounter Low   Length of Encounter 15 minutes   Spiritual Assessment Completed Yes   Spiritual/Hindu   Type Spiritual support   Assessment Calm; Approachable;Coping   Intervention Active listening;Explored feelings, thoughts, concerns;Prayer;Sustaining presence/ Ministry of presence   Outcome Expressed gratitude;Coping;Engaged in conversation;Receptive; Hopeful;Encouraged

## 2022-02-08 PROBLEM — I87.2 CHRONIC VENOUS STASIS DERMATITIS OF BOTH LOWER EXTREMITIES: Status: ACTIVE | Noted: 2022-02-08

## 2022-02-08 LAB
ANION GAP SERPL CALCULATED.3IONS-SCNC: 9 MMOL/L (ref 9–17)
BUN BLDV-MCNC: 50 MG/DL (ref 8–23)
BUN/CREAT BLD: ABNORMAL (ref 9–20)
CALCIUM SERPL-MCNC: 8.2 MG/DL (ref 8.6–10.4)
CHLORIDE BLD-SCNC: 106 MMOL/L (ref 98–107)
CO2: 26 MMOL/L (ref 20–31)
CREAT SERPL-MCNC: 1.8 MG/DL (ref 0.5–0.9)
GFR AFRICAN AMERICAN: 34 ML/MIN
GFR NON-AFRICAN AMERICAN: 28 ML/MIN
GFR SERPL CREATININE-BSD FRML MDRD: ABNORMAL ML/MIN/{1.73_M2}
GFR SERPL CREATININE-BSD FRML MDRD: ABNORMAL ML/MIN/{1.73_M2}
GLUCOSE BLD-MCNC: 105 MG/DL (ref 65–105)
GLUCOSE BLD-MCNC: 187 MG/DL (ref 65–105)
GLUCOSE BLD-MCNC: 81 MG/DL (ref 65–105)
GLUCOSE BLD-MCNC: 89 MG/DL (ref 70–99)
GLUCOSE BLD-MCNC: 97 MG/DL (ref 65–105)
HCT VFR BLD CALC: 29.1 % (ref 36–46)
HEMOGLOBIN: 9.3 G/DL (ref 12–16)
MCH RBC QN AUTO: 28.8 PG (ref 26–34)
MCHC RBC AUTO-ENTMCNC: 31.8 G/DL (ref 31–37)
MCV RBC AUTO: 90.6 FL (ref 80–100)
NRBC AUTOMATED: ABNORMAL PER 100 WBC
PDW BLD-RTO: 16.7 % (ref 11.5–14.9)
PLATELET # BLD: 247 K/UL (ref 150–450)
PMV BLD AUTO: 8.3 FL (ref 6–12)
POTASSIUM SERPL-SCNC: 4.5 MMOL/L (ref 3.7–5.3)
RBC # BLD: 3.22 M/UL (ref 4–5.2)
SODIUM BLD-SCNC: 141 MMOL/L (ref 135–144)
WBC # BLD: 8.6 K/UL (ref 3.5–11)

## 2022-02-08 PROCEDURE — 97110 THERAPEUTIC EXERCISES: CPT

## 2022-02-08 PROCEDURE — 99232 SBSQ HOSP IP/OBS MODERATE 35: CPT | Performed by: INTERNAL MEDICINE

## 2022-02-08 PROCEDURE — 6370000000 HC RX 637 (ALT 250 FOR IP): Performed by: STUDENT IN AN ORGANIZED HEALTH CARE EDUCATION/TRAINING PROGRAM

## 2022-02-08 PROCEDURE — 80048 BASIC METABOLIC PNL TOTAL CA: CPT

## 2022-02-08 PROCEDURE — 6370000000 HC RX 637 (ALT 250 FOR IP): Performed by: INTERNAL MEDICINE

## 2022-02-08 PROCEDURE — 97535 SELF CARE MNGMENT TRAINING: CPT

## 2022-02-08 PROCEDURE — 82088 ASSAY OF ALDOSTERONE: CPT

## 2022-02-08 PROCEDURE — 2060000000 HC ICU INTERMEDIATE R&B

## 2022-02-08 PROCEDURE — 85027 COMPLETE CBC AUTOMATED: CPT

## 2022-02-08 PROCEDURE — 2580000003 HC RX 258: Performed by: INTERNAL MEDICINE

## 2022-02-08 PROCEDURE — 84244 ASSAY OF RENIN: CPT

## 2022-02-08 PROCEDURE — 2500000003 HC RX 250 WO HCPCS: Performed by: INTERNAL MEDICINE

## 2022-02-08 PROCEDURE — 97116 GAIT TRAINING THERAPY: CPT

## 2022-02-08 PROCEDURE — 6360000002 HC RX W HCPCS: Performed by: INTERNAL MEDICINE

## 2022-02-08 PROCEDURE — 36415 COLL VENOUS BLD VENIPUNCTURE: CPT

## 2022-02-08 PROCEDURE — 97530 THERAPEUTIC ACTIVITIES: CPT

## 2022-02-08 PROCEDURE — 82947 ASSAY GLUCOSE BLOOD QUANT: CPT

## 2022-02-08 PROCEDURE — 6370000000 HC RX 637 (ALT 250 FOR IP)

## 2022-02-08 RX ORDER — BUMETANIDE 0.25 MG/ML
2 INJECTION, SOLUTION INTRAMUSCULAR; INTRAVENOUS 2 TIMES DAILY
Status: DISCONTINUED | OUTPATIENT
Start: 2022-02-08 | End: 2022-02-08

## 2022-02-08 RX ORDER — BUMETANIDE 0.25 MG/ML
0.5 INJECTION, SOLUTION INTRAMUSCULAR; INTRAVENOUS 2 TIMES DAILY
Status: DISCONTINUED | OUTPATIENT
Start: 2022-02-08 | End: 2022-02-09

## 2022-02-08 RX ADMIN — METOPROLOL TARTRATE 50 MG: 50 TABLET ORAL at 10:15

## 2022-02-08 RX ADMIN — SODIUM BICARBONATE TAB 650 MG 650 MG: 650 TAB at 14:40

## 2022-02-08 RX ADMIN — ACETAMINOPHEN 650 MG: 325 TABLET, FILM COATED ORAL at 04:21

## 2022-02-08 RX ADMIN — ATORVASTATIN CALCIUM 40 MG: 40 TABLET, FILM COATED ORAL at 10:15

## 2022-02-08 RX ADMIN — SODIUM BICARBONATE TAB 650 MG 650 MG: 650 TAB at 10:15

## 2022-02-08 RX ADMIN — ACETAMINOPHEN 650 MG: 325 TABLET, FILM COATED ORAL at 18:30

## 2022-02-08 RX ADMIN — SODIUM CHLORIDE, PRESERVATIVE FREE 10 ML: 5 INJECTION INTRAVENOUS at 21:20

## 2022-02-08 RX ADMIN — ALLOPURINOL 300 MG: 300 TABLET ORAL at 10:15

## 2022-02-08 RX ADMIN — SODIUM CHLORIDE, PRESERVATIVE FREE 10 ML: 5 INJECTION INTRAVENOUS at 10:14

## 2022-02-08 RX ADMIN — INSULIN LISPRO 1 UNITS: 100 INJECTION, SOLUTION INTRAVENOUS; SUBCUTANEOUS at 21:20

## 2022-02-08 RX ADMIN — BUMETANIDE 2 MG: 0.25 INJECTION INTRAMUSCULAR; INTRAVENOUS at 10:13

## 2022-02-08 RX ADMIN — AMLODIPINE BESYLATE 5 MG: 5 TABLET ORAL at 10:15

## 2022-02-08 RX ADMIN — CLOPIDOGREL BISULFATE 75 MG: 75 TABLET ORAL at 10:15

## 2022-02-08 RX ADMIN — METOPROLOL TARTRATE 50 MG: 50 TABLET ORAL at 21:18

## 2022-02-08 RX ADMIN — APIXABAN 5 MG: 5 TABLET, FILM COATED ORAL at 10:15

## 2022-02-08 RX ADMIN — APIXABAN 5 MG: 5 TABLET, FILM COATED ORAL at 21:18

## 2022-02-08 RX ADMIN — SODIUM BICARBONATE TAB 650 MG 650 MG: 650 TAB at 21:18

## 2022-02-08 RX ADMIN — DIPHENHYDRAMINE HYDROCHLORIDE 25 MG: 50 INJECTION, SOLUTION INTRAMUSCULAR; INTRAVENOUS at 21:32

## 2022-02-08 RX ADMIN — BUMETANIDE 0.5 MG: 0.25 INJECTION INTRAMUSCULAR; INTRAVENOUS at 18:30

## 2022-02-08 ASSESSMENT — ENCOUNTER SYMPTOMS
VOMITING: 0
CONSTIPATION: 0
EYE DISCHARGE: 0
SORE THROAT: 0
DIARRHEA: 0
ABDOMINAL PAIN: 0
COUGH: 1
ABDOMINAL DISTENTION: 1
NAUSEA: 0
SHORTNESS OF BREATH: 1
EYE REDNESS: 0

## 2022-02-08 ASSESSMENT — PAIN SCALES - GENERAL
PAINLEVEL_OUTOF10: 4
PAINLEVEL_OUTOF10: 3

## 2022-02-08 NOTE — CARE COORDINATION
ONGOING DISCHARGE PLAN:    Patient is alert and oriented x4. Spoke with patient regarding discharge plan and patient confirms that plan is still home without needs. Declined VNS. Daughters at bedside and are agreeable to plan. Cre 1.80. On IV Bumex 0.5mg BID. Will continue to follow for additional discharge needs.     Electronically signed by Jennifer Suarez RN on 2/8/2022 at 1:52 PM

## 2022-02-08 NOTE — PROGRESS NOTES
Physical Therapy  Facility/Department: [facility] PROGRESSIVE CARE  Daily Treatment Note  NAME: Bessy Carroll  :   MRN: [id number]    Date of Service: 2022    Discharge Recommendations:  Continue to assess pending progress   PT Equipment Recommendations  Equipment Needed: No    Assessment   Body structures, Functions, Activity limitations: Decreased functional mobility ; Decreased balance;Decreased endurance  History: CVA  REQUIRES PT FOLLOW UP: Yes  Activity Tolerance  Activity Tolerance: Patient limited by endurance     Patient Diagnosis(es): The primary encounter diagnosis was Acute on chronic combined systolic (congestive) and diastolic (congestive) heart failure ([address] A diagnosis of Hypoxia was also pertinent to this visit. has a past medical history of Acute CVA (cerebrovascular accident) ([address] Altered mental status, Arthritis, Brain mass, Cellulitis and abscess, Cellulitis and abscess of unspecified site, Cellulitis of both lower extremities, Cellulitis of left lower extremity, Cellulitis of lower extremity, CHF (congestive heart failure) ([address] Chronic kidney disease, unspecified, Coronary atherosclerosis of native coronary artery, Essential hypertension, Essential hypertension, malignant, Hallucinations, Hard of hearing, Head contusion, Hypokalemia, Iron deficiency anemia, unspecified, Meningioma ([address] Myocardial infarction ([address] Other and unspecified hyperlipidemia, Pure hypercholesterolemia, Toxic metabolic encephalopathy, Type II or unspecified type diabetes mellitus without mention of complication, not stated as uncontrolled, Unspecified asthma(493.90), Unspecified venous (peripheral) insufficiency, Unspecified vitamin D deficiency, and UTI (urinary tract infection). has a past surgical history that includes Tonsillectomy and adenoidectomy and Coronary artery bypass graft.     Restrictions  Restrictions/Precautions  Restrictions/Precautions: Fall Risk,General Precautions  Required Training,Functional Mobility Training,Transfer Training,Gait Training,ADL/Self-care Ebix Devices  Type of devices: Call light within reach,Left in bed,Patient at risk for falls        02/08/22 1425   PT Individual Minutes   Time In 1116   Time Out 1141   Minutes 25     Electronically signed by Cristhian Shah PTA on 2/8/22 at 2:30 PM EST

## 2022-02-08 NOTE — PROGRESS NOTES
notified; Left in bed;Bed alarm in place (daughter present at bedside. )  Equipment Recommendations  Equipment Needed: Yes  Raised Toilet Seat: With Grab Bars  Shower Seat: With a back  Reacher: x  Sock Aid: x  Other: long handled sponge          Plan  Safety Devices  Safety Devices in place: Yes  Type of devices:  All fall risk precautions in place,Call light within reach,Nurse notified,Left in bed,Bed alarm in place (daughter present at bedside. )  Plan  Times per week: 4-6  Current Treatment Recommendations: Self-Care / ADL,Strengthening,Balance Training,Functional Mobility Training,Endurance Training,Pain Management,Safety Education & Training,Patient/Caregiver Education & Training,Equipment Evaluation, Education, & procurement,Home Management Training      Goals  Short term goals  Time Frame for Short term goals: By discharge   Short term goal 1: Pt will complete BADLs with supervision and good safety  Short term goal 2: Pt will complete functional transfers/mobility during self care tasks with supervision and good safety while maintaining SpO2 above 90%  Short term goal 3: Pt will tolerate standing 5+ minutes during functional activities to increase safety and independence with self care and mobility  Short term goal 4: Pt will verbalize/demonstrate good understanding of home safety and energy conservation strategies to increase safety and independence with self care and mobility  Short term goal 5: Pt will participate in 15+ minutes of therapeutic exercises/functional activities to increase safety and independence with self care and mobility    OT Individual Minutes  Time In: 1615  Time Out: 1640  Minutes: 25      Electronically signed by DEEJAY Swanson on 2/8/22 at 5:10 PM EST

## 2022-02-08 NOTE — PROGRESS NOTES
and light-headedness. Psychiatric/Behavioral: Negative for confusion. The patient is nervous/anxious. Medications: Allergies:     Allergies   Allergen Reactions    Latex Rash    Aleve [Naproxen Sodium]      Chronic kidney disease stage III, CHF    Pioglitazone Other (See Comments)     Congestive heart failure    Claritin [Loratadine]     Keflex [Cephalexin]     Lisinopril      Needs clarification of contraindication       Current Meds:   Scheduled Meds:    bumetanide  2 mg IntraVENous BID    sodium chloride flush  5-40 mL IntraVENous 2 times per day    amLODIPine  5 mg Oral Daily    apixaban  5 mg Oral BID    metoprolol tartrate  50 mg Oral BID    allopurinol  300 mg Oral Daily    atorvastatin  40 mg Oral Daily    clopidogrel  75 mg Oral Daily    sodium bicarbonate  650 mg Oral TID    insulin lispro  0-6 Units SubCUTAneous TID WC    insulin lispro  0-3 Units SubCUTAneous Nightly    losartan  25 mg Oral Daily     Continuous Infusions:    sodium chloride      dextrose       PRN Meds: perflutren lipid microspheres, sodium chloride flush, sodium chloride, ondansetron **OR** ondansetron, polyethylene glycol, acetaminophen **OR** acetaminophen, albuterol sulfate HFA, glucose, dextrose, glucagon (rDNA), dextrose, diphenhydrAMINE    Data:     Past Medical History:   has a past medical history of Acute CVA (cerebrovascular accident) ([address] Altered mental status, Arthritis, Brain mass, Cellulitis and abscess, Cellulitis and abscess of unspecified site, Cellulitis of both lower extremities, Cellulitis of left lower extremity, Cellulitis of lower extremity, CHF (congestive heart failure) ([address] Chronic kidney disease, unspecified, Coronary atherosclerosis of native coronary artery, Essential hypertension, Essential hypertension, malignant, Hallucinations, Hard of hearing, Head contusion, Hypokalemia, Iron deficiency anemia, unspecified, Meningioma ([address] Myocardial infarction ([address] Other and unspecified hyperlipidemia, Pure hypercholesterolemia, Toxic metabolic encephalopathy, Type II or unspecified type diabetes mellitus without mention of complication, not stated as uncontrolled, Unspecified asthma(493.90), Unspecified venous (peripheral) insufficiency, Unspecified vitamin D deficiency, and UTI (urinary tract infection). Social History:   reports that she quit smoking about 22 years ago. She has a 2.50 pack-year smoking history. She has never used smokeless tobacco. She reports previous alcohol use. She reports that she does not use drugs. Family History:   Family History   Problem Relation Age of Onset    Diabetes Mother     Heart Disease Mother     Heart Disease Father     Diabetes Sister     High Blood Pressure Sister     Diabetes Maternal Grandmother        Vitals:  BP (!) 141/52   Pulse 80   Temp 97.6 °F (36.4 °C) (Oral)   Resp 18   Ht 4' 11\" (1.499 m)   Wt 156 lb 8.4 oz (71 kg)   SpO2 95%   BMI 31.61 kg/m²   Temp (24hrs), Av.4 °F (36.3 °C), Min:96.7 °F (35.9 °C), Max:97.8 °F (36.6 °C)    Recent Labs     22  1113 22  1658 22  1951 22  0640   POCGLU 125* 94 152* 81     Vitals:    22 121949 22 0015 22 0515   BP: (!) 160/60 (!) 126/47 (!) 141/52    Pulse: 88 81 80    Resp: 18  18    Temp: 97.8 °F (36.6 °C) 96.7 °F (35.9 °C) 97.6 °F (36.4 °C)    TempSrc: Oral Oral Oral    SpO2: 98% 98% 95%    Weight:    156 lb 8.4 oz (71 kg)   Height:           I/O(24Hr):     Intake/Output Summary (Last 24 hours) at 2022 0728  Last data filed at 2022 0602  Gross per 24 hour   Intake 1020 ml   Output 975 ml   Net 45 ml       Labs:  Recent Results (from the past 24 hour(s))   POC Glucose Fingerstick    Collection Time: 22 11:13 AM   Result Value Ref Range    POC Glucose 125 (H) 65 - 105 mg/dL   POC Glucose Fingerstick    Collection Time: 22  4:58 PM   Result Value Ref Range    POC Glucose 94 65 - 105 mg/dL   POC Glucose Fingerstick    Collection Time: 02/07/22  7:50 PM   Result Value Ref Range    POC Glucose 152 (H) 65 - 105 mg/dL   Basic Metabolic Panel w/ Reflex to MG    Collection Time: 02/08/22  5:44 AM   Result Value Ref Range    Glucose 89 70 - 99 mg/dL    BUN 50 (H) 8 - 23 mg/dL    CREATININE 1.80 (H) 0.50 - 0.90 mg/dL    Bun/Cre Ratio NOT REPORTED 9 - 20    Calcium 8.2 (L) 8.6 - 10.4 mg/dL    Sodium 141 135 - 144 mmol/L    Potassium 4.5 3.7 - 5.3 mmol/L    Chloride 106 98 - 107 mmol/L    CO2 26 20 - 31 mmol/L    Anion Gap 9 9 - 17 mmol/L    GFR Non-African American 28 (L) >60 mL/min    GFR  34 (L) >60 mL/min    GFR Comment          GFR Staging NOT REPORTED    CBC    Collection Time: 02/08/22  5:44 AM   Result Value Ref Range    WBC 8.6 3.5 - 11.0 k/uL    RBC 3.22 (L) 4.0 - 5.2 m/uL    Hemoglobin 9.3 (L) 12.0 - 16.0 g/dL    Hematocrit 29.1 (L) 36 - 46 %    MCV 90.6 80 - 100 fL    MCH 28.8 26 - 34 pg    MCHC 31.8 31 - 37 g/dL    RDW 16.7 (H) 11.5 - 14.9 %    Platelets 939 286 - 596 k/uL    MPV 8.3 6.0 - 12.0 fL    NRBC Automated NOT REPORTED per 100 WBC   POC Glucose Fingerstick    Collection Time: 02/08/22  6:40 AM   Result Value Ref Range    POC Glucose 81 65 - 105 mg/dL         Lab Results   Component Value Date/Time    SPECIAL NOT REPORTED 06/11/2021 04:31 PM     Lab Results   Component Value Date/Time    CULTURE KLEBSIELLA PNEUMONIAE >037196 CFU/ML (A) 06/11/2021 04:31 PM         Radiology:    XR CHEST PORTABLE    Result Date: 2/6/2022  Findings suggesting likely CHF as described. Physical Examination:        Physical Exam  Constitutional:       General: She is not in acute distress. Appearance: Normal appearance. She is not ill-appearing. Comments: Pt found lying in bed, appears comfortable   HENT:      Head: Normocephalic and atraumatic. Nose: Nose normal.   Eyes:      General: No scleral icterus.      Conjunctiva/sclera: Conjunctivae normal.   Cardiovascular:      Rate and Rhythm: Normal rate and regular rhythm. Pulmonary:      Effort: Pulmonary effort is normal. No respiratory distress. Breath sounds: No wheezing, rhonchi or rales. Comments: Decreased aeration throughout,  On 1L O2 via NC,   Not SOB while speaking. Abdominal:      General: Bowel sounds are normal. There is distension. Palpations: Abdomen is soft. Tenderness: There is no abdominal tenderness. There is no guarding or rebound. Musculoskeletal:      Right lower leg: Edema present. Left lower leg: Edema present. Comments: Bilateral pitting edema from feet to shins; stops below the knee   Skin:     General: Skin is warm and dry. Comments: Ace wraps in place   Neurological:      Mental Status: She is alert. Mental status is at baseline. Psychiatric:         Mood and Affect: Mood normal.         Behavior: Behavior normal.           Assessment:        Primary Problem  Acute on chronic combined systolic (congestive) and diastolic (congestive) heart failure [facility])    Active Hospital Problems    Diagnosis Date Noted    Chronic venous stasis dermatitis of both lower extremities [I87.2] 02/08/2022    Acute on chronic combined systolic (congestive) and diastolic (congestive) heart failure ([facility]) [I50.43] 02/06/2022    Chronic a-fib ([facility] [I48.20] 02/06/2022    Acute kidney injury ([facility] [N17.9] 01/15/2022    HTN.  Benign hypertension with CKD (chronic kidney disease) stage III ([facility] [I12.9, N18.30] 02/20/2020    Impaired hearing [H91.90] 02/20/2020    Gout with tophi [M1A.9XX1] 02/20/2020    COPD (chronic obstructive pulmonary disease) ([facility]) [J44.9] 02/20/2020    Type 2 diabetes mellitus with stage 3 chronic kidney disease, without long-term current use of insulin ([facility]) [E11.22, N18.30]     Stage 3 chronic kidney disease ([facility] [N18.30]     Coronary artery disease involving native coronary artery of native heart without angina pectoris [I25.10]     Asthma [J45.909]        Plan:        Acute on chronic combined systolic and diastolic CHF  SOB x 1 week, increased past 2 days prior to admission  Initial proBNP 3726  Last echo done 2020 EF 45-50% on TTE and EF 55% on KARLA  Repeat TTE showing EF > 55% , mild mitral & tricuspid regurgitation  Bumetanide IV 2 mg BID; consider decreasing d/t elevation in Cr => will await further recommendations from Nephro & Cardiology  Cardiology following  Strict I&O's  Daily weights     Hypertension - improved  /63  Continue home Amlodipine po 6 mg QD  Continue home Metoprolol po 50 mg BID  Losartan po 25 mg started 2/7 per Nephro recommendation     COPD (on RA at baseline) & hx of asthma  Increased O2 needs likely 2/2 CHF exacerbation  Wean off oxygen as tolerated     Hx of CAD   On Plavix      History of atrial fibrillation  EKG: Normal sinus rhythm  On Eliquis     JOY  Creatinine 1.8 today  Electrolytes ok  Consider decreasing Bumex dose; will await for further recommendations from Nephro    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  Creatinine baseline 1.6 mg/dL  Creatinine 1.47 on presentation; now 1.34  Pt follows with Dr. Haider Calderon as outpatient,   Inpatient nephrology consulted, recommend              Stanford catheter              IV Bumex 2 mg twice daily              Metoprolol p.o. 50 mg twice daily for BP control              Losartan p.o. 25 mg daily     Diabetes Mellitus type 2  11/2021 hemoglobin A1c 5.8  POCT every 4 hours  Low-dose insulin sliding scale  Hypoglycemia protocol     Gout  Continue home allopurinol oral 300 mg daily     Chronic venous stasis dermatitis   Wound care assessed on 2/7; recommendations noted  Kerlix/Ace wrap; can use intermittently  Continue to monitor; likely to improve with improving edema         Code: FULL  DVT prophylaxis: on Lovenox  GI prophylaxis: none  PT OT evaluation  SW discharge planning       Please note: Use of a speech recognition software was used in the creation of portions of this note and dictation errors, including those of syntax and sound alike word substitutions, may have escaped proofreading. Corrie Velez DO  2/8/2022  7:28 AM       Attending Physician Statement  I have discussed the care of [address] [place] [address] Drive and I have examined the patient myselft and taken ros and hpi , including pertinent history and exam findings,  with the resident. I have reviewed the key elements of all parts of the encounter with the resident. I agree with the assessment, plan and orders as documented by the resident.   Ac on ch Systolic CHF   HTN   JOY on CKD , reduce dose of bumex   CAD stable   Afib on eliquis   COPD   DM2,   Venous stasis dermatitis     Electronically signed by Jefferson Toscano MD

## 2022-02-08 NOTE — PLAN OF CARE
Problem: Falls - Risk of:  Goal: Will remain free from falls  Description: Will remain free from falls  Outcome: Met This Shift  Goal: Absence of physical injury  Description: Absence of physical injury  Outcome: Met This Shift     Problem: Cardiac:  Goal: Ability to maintain vital signs within normal range will improve  Description: Ability to maintain vital signs within normal range will improve  Outcome: Ongoing  Goal: Cardiovascular alteration will improve  Description: Cardiovascular alteration will improve  Outcome: Ongoing     Problem: Physical Regulation:  Goal: Complications related to the disease process, condition or treatment will be avoided or minimized  Description: Complications related to the disease process, condition or treatment will be avoided or minimized  Outcome: Ongoing     Problem: Anxiety:  Goal: Level of anxiety will decrease  Description: Level of anxiety will decrease  Outcome: Ongoing     Problem: Skin Integrity:  Goal: Will show no infection signs and symptoms  Description: Will show no infection signs and symptoms  Outcome: Ongoing  Goal: Absence of new skin breakdown  Description: Absence of new skin breakdown  Outcome: Ongoing

## 2022-02-08 NOTE — PROGRESS NOTES
[facility]  PROGRESS NOTE                  Date:   2/8/2022  Patient name: Gayathri Rivas  Date of admission:  2/6/2022  9:32 AM  MRN:   [id number]  YOB: 1950    Reason for Admission: CHF     CHIEF COMPLAINT:   shortness of breath and edema     Subjective:  Denies any cp. LE edema. Still having some cellulitis changes.        Current Facility-Administered Medications:     bumetanide (BUMEX) injection 2 mg, 2 mg, IntraVENous, BID, Mauro Ovalles MD    perflutren lipid microspheres (DEFINITY) injection 2.2 mg, 2 mL, IntraVENous, ONCE PRN, Jann Duong DO    sodium chloride flush 0.9 % injection 5-40 mL, 5-40 mL, IntraVENous, 2 times per day, Donnetta Ahumada, MD, 10 mL at 02/07/22 2040    sodium chloride flush 0.9 % injection 5-40 mL, 5-40 mL, IntraVENous, PRN, Donnetta Ahumada, MD    0.9 % sodium chloride infusion, 25 mL, IntraVENous, PRN, Donnetta Ahumada, MD    ondansetron (ZOFRAN-ODT) disintegrating tablet 4 mg, 4 mg, Oral, Q8H PRN **OR** ondansetron (ZOFRAN) injection 4 mg, 4 mg, IntraVENous, Q6H PRN, Donnetta Ahumada, MD    polyethylene glycol (GLYCOLAX) packet 17 g, 17 g, Oral, Daily PRN, Donnetta Ahumada, MD    acetaminophen (TYLENOL) tablet 650 mg, 650 mg, Oral, Q6H PRN, 650 mg at 02/08/22 0421 **OR** acetaminophen (TYLENOL) suppository 650 mg, 650 mg, Rectal, Q6H PRN, Donnetta Ahumada, MD    amLODIPine (NORVASC) tablet 5 mg, 5 mg, Oral, Daily, Vianey Kingston MD, 5 mg at 02/07/22 3275    apixaban (ELIQUIS) tablet 5 mg, 5 mg, Oral, BID, Vianey Kingston MD, 5 mg at 02/07/22 2035    metoprolol tartrate (LOPRESSOR) tablet 50 mg, 50 mg, Oral, BID, Vianey Kingston MD, 50 mg at 02/07/22 1073    albuterol sulfate  (90 Base) MCG/ACT inhaler 2 puff, 2 puff, Inhalation, Q4H PRN, Shanique Mir MD    allopurinol (ZYLOPRIM) tablet 300 mg, 300 mg, Oral, Daily, Shanique Mir MD, 300 mg at 02/07/22 3160    atorvastatin (LIPITOR) tablet 40 mg, 40 mg, Oral, Daily, Shanique Mir MD, 40 mg at 02/07/22 1852    clopidogrel (PLAVIX) tablet 75 mg, 75 mg, Oral, Daily, Stacy Baez MD, 75 mg at 02/07/22 1030    sodium bicarbonate tablet 650 mg, 650 mg, Oral, TID, Stacy Baez MD, 650 mg at 02/07/22 2035    insulin lispro (HUMALOG) injection vial 0-6 Units, 0-6 Units, SubCUTAneous, TID WC, Shante Hayward, DO    insulin lispro (HUMALOG) injection vial 0-3 Units, 0-3 Units, SubCUTAneous, Nightly, Shante Hayward, DO, 1 Units at 02/07/22 2035    glucose (GLUTOSE) 40 % oral gel 15 g, 15 g, Oral, PRN, Richrd Kylah, DO    dextrose 50 % IV solution, 12.5 g, IntraVENous, PRN, Richrd Kylah, DO    glucagon (rDNA) injection 1 mg, 1 mg, IntraMUSCular, PRN, Richrd Kylah, DO    dextrose 5 % solution, 100 mL/hr, IntraVENous, PRN, Richrd Kylah, DO    diphenhydrAMINE (BENADRYL) injection 25 mg, 25 mg, IntraVENous, Nightly PRN, Cesilia Pressley MD, 25 mg at 02/07/22 2035    [Held by provider] losartan (COZAAR) tablet 25 mg, 25 mg, Oral, Daily, Lance Hull MD, 25 mg at 02/07/22 6487    PHYSICAL EXAM:    Physical Examination:    BP (!) 151/63   Pulse 89   Temp 97.7 °F (36.5 °C) (Oral)   Resp 20   Ht 4' 11\" (1.499 m)   Wt 156 lb 8.4 oz (71 kg)   SpO2 97%   BMI 31.61 kg/m²    Constitutional and General Appearance: alert, cooperative, in no distress on 2 L nasal cannula oxygen  HEENT: PERRL, no cervical lymphadenopathy. Normal oral mucosa  Respiratory:  · Normal excursion and expansion without use of accessory muscles  · Resp Auscultation: Good respiratory effort. No for increased work of breathing. On auscultation: Reduced air entry at bases with bilateral inspiratory rales.   No wheezing  Cardiovascular:  · The apical impulse is not displaced  · Heart tones are crisp and normal. regular S1 and S2. Murmurs: None   · Jugular venous pulsation Normal  · Thre is no carotid bruit   · Peripheral pulses are symmetrical and full   Abdomen:  · No masses or tenderness  · Bowel sounds present  Extremities:  ·  No Cyanosis or Clubbing  ·  Lower extremity edema: trace edema. Redness noted. ·  Skin: Warm and dry  Neurological:  · Not done     Labs:     CBC:   Recent Labs     02/07/22 0623 02/08/22  0544   WBC 7.5 8.6   HGB 10.3* 9.3*   HCT 33.0* 29.1*    247     BMP:   Recent Labs     02/07/22  0623 02/08/22  0544   * 141   K 4.3 4.5   CO2 29 26   BUN 42* 50*   CREATININE 1.34* 1.80*   LABGLOM 39* 28*   GLUCOSE 110* 89     BNP: No results for input(s): BNP in the last 72 hours. PT/INR:   Recent Labs     02/06/22  1011   PROTIME 16.0*   INR 1.3     APTT:  Recent Labs     02/06/22  1011   APTT 34.9     CARDIAC ENZYMES:No results for input(s): CKTOTAL, CKMB, CKMBINDEX, TROPONINI in the last 72 hours. FASTING LIPID PANEL:  Lab Results   Component Value Date    HDL 59 12/06/2021    TRIG 132 12/06/2021     LIVER PROFILE:  Recent Labs     02/06/22  1011   AST 32*   ALT 22   LABALBU 3.3*     DATA:    Diagnostics:      EKG 2/6/2022: Normal sinus rhythm, Moderate voltage criteria for LVH, Nonspecific ST abnormality      Previous cardiac testing:       CATH 7/21/06:   Patent LIMA to LAD and SVG to RCA. Occluded SVG to OM 2. Occluded RCA.      CABG with LIMA-LAD, SVG-OM and SVG-RCA in 2003. TTE/CV July 2020  Summary:   1. A KARLA was performed without complications. 2. Normal LV size with normal LVEF. 3. No thrombus or valvular vegetation identified  4. Moderate atheromatous disease noted in aortic arch     CARDIOVERSION:  After an adequate level of sedation was achieved, 200J in biphasic synchronized delivery was administered. conversion to normal sinus rhythm. The patient awoke without complications    Echo 6/6/56:  Normal left ventricle size and function with an estimated EF > 55%. No segmental wall motion abnormalities seen. Moderate left ventricular hypertrophy. Normal right ventricular size and function. Left atrial dilatation. Aortic valve is trileaflet.  Mild aortic stenosis. Mild-moderate aortic insufficiency. Normal aortic root dimension. Mitral annular calcification is seen. Mild mitral regurgitation. Mild tricuspid regurgitation. Normal right ventricular systolic pressure. No significant pericardial effusion is seen. Pleural effusion present. IMPRESSION:    Acute on chronic diastolic CHF- HFpEF- EF > 55% on Echo 2/7/22- resolving  History of ischemic cardiomyopathy- recovered based on Echo 2/7/22  Multivessel CAD status post CABG in 2003 (patent LIMA-LAD and SVG-RCA in 2006)  Paroxysmal atrial fibrillation, history of KARLA/CV in 2020, currently in NSR   Bilateral leg cellulitis  CKD stage III  Essential hypertension      RECOMMENDATIONS:  1. Nephro is managing diuresis- has some JOY- consider lower dose diuretic  2. Continue Eliquis, Plavix, Lopressor and losartan  3. Would benefit from outpatient stress testing    Will from a distance. Please follow up in 2 weeks. Discussed with patient and nursing. Thank you for allowing me to participate in the care of this patient, please do not hesitate to call if you have any questions. Lakhwinder Queen DO, [facility] [place], [address], [address], [place] [address] [facility]  [facility]. [employer]  [phone]

## 2022-02-09 LAB
ANION GAP SERPL CALCULATED.3IONS-SCNC: 9 MMOL/L (ref 9–17)
BUN BLDV-MCNC: 58 MG/DL (ref 8–23)
BUN/CREAT BLD: ABNORMAL (ref 9–20)
CALCIUM SERPL-MCNC: 8.3 MG/DL (ref 8.6–10.4)
CHLORIDE BLD-SCNC: 107 MMOL/L (ref 98–107)
CO2: 27 MMOL/L (ref 20–31)
CREAT SERPL-MCNC: 2.22 MG/DL (ref 0.5–0.9)
GFR AFRICAN AMERICAN: 26 ML/MIN
GFR NON-AFRICAN AMERICAN: 22 ML/MIN
GFR SERPL CREATININE-BSD FRML MDRD: ABNORMAL ML/MIN/{1.73_M2}
GFR SERPL CREATININE-BSD FRML MDRD: ABNORMAL ML/MIN/{1.73_M2}
GLUCOSE BLD-MCNC: 101 MG/DL (ref 65–105)
GLUCOSE BLD-MCNC: 123 MG/DL (ref 65–105)
GLUCOSE BLD-MCNC: 138 MG/DL (ref 65–105)
GLUCOSE BLD-MCNC: 80 MG/DL (ref 65–105)
GLUCOSE BLD-MCNC: 91 MG/DL (ref 70–99)
HCT VFR BLD CALC: 31 % (ref 36–46)
HEMOGLOBIN: 9.7 G/DL (ref 12–16)
MCH RBC QN AUTO: 28.2 PG (ref 26–34)
MCHC RBC AUTO-ENTMCNC: 31.2 G/DL (ref 31–37)
MCV RBC AUTO: 90.4 FL (ref 80–100)
NRBC AUTOMATED: ABNORMAL PER 100 WBC
PDW BLD-RTO: 17.4 % (ref 11.5–14.9)
PLATELET # BLD: 251 K/UL (ref 150–450)
PMV BLD AUTO: 7.9 FL (ref 6–12)
POTASSIUM SERPL-SCNC: 5 MMOL/L (ref 3.7–5.3)
RBC # BLD: 3.43 M/UL (ref 4–5.2)
SODIUM BLD-SCNC: 143 MMOL/L (ref 135–144)
WBC # BLD: 8.1 K/UL (ref 3.5–11)

## 2022-02-09 PROCEDURE — 6370000000 HC RX 637 (ALT 250 FOR IP)

## 2022-02-09 PROCEDURE — 80048 BASIC METABOLIC PNL TOTAL CA: CPT

## 2022-02-09 PROCEDURE — 6370000000 HC RX 637 (ALT 250 FOR IP): Performed by: INTERNAL MEDICINE

## 2022-02-09 PROCEDURE — 85027 COMPLETE CBC AUTOMATED: CPT

## 2022-02-09 PROCEDURE — 99232 SBSQ HOSP IP/OBS MODERATE 35: CPT | Performed by: INTERNAL MEDICINE

## 2022-02-09 PROCEDURE — 2580000003 HC RX 258: Performed by: INTERNAL MEDICINE

## 2022-02-09 PROCEDURE — 2060000000 HC ICU INTERMEDIATE R&B

## 2022-02-09 PROCEDURE — 6370000000 HC RX 637 (ALT 250 FOR IP): Performed by: STUDENT IN AN ORGANIZED HEALTH CARE EDUCATION/TRAINING PROGRAM

## 2022-02-09 PROCEDURE — 82947 ASSAY GLUCOSE BLOOD QUANT: CPT

## 2022-02-09 PROCEDURE — 36415 COLL VENOUS BLD VENIPUNCTURE: CPT

## 2022-02-09 RX ORDER — AMLODIPINE BESYLATE 10 MG/1
10 TABLET ORAL DAILY
Status: DISCONTINUED | OUTPATIENT
Start: 2022-02-10 | End: 2022-02-11 | Stop reason: HOSPADM

## 2022-02-09 RX ORDER — BUMETANIDE 1 MG/1
1 TABLET ORAL DAILY
Status: DISCONTINUED | OUTPATIENT
Start: 2022-02-10 | End: 2022-02-09

## 2022-02-09 RX ORDER — BUMETANIDE 1 MG/1
2 TABLET ORAL 2 TIMES DAILY
Status: DISCONTINUED | OUTPATIENT
Start: 2022-02-09 | End: 2022-02-11 | Stop reason: HOSPADM

## 2022-02-09 RX ORDER — BUMETANIDE 1 MG/1
1 TABLET ORAL ONCE
Status: COMPLETED | OUTPATIENT
Start: 2022-02-09 | End: 2022-02-09

## 2022-02-09 RX ORDER — SODIUM CHLORIDE 9 MG/ML
INJECTION, SOLUTION INTRAVENOUS CONTINUOUS
Status: DISCONTINUED | OUTPATIENT
Start: 2022-02-09 | End: 2022-02-09

## 2022-02-09 RX ADMIN — METOPROLOL TARTRATE 50 MG: 50 TABLET ORAL at 21:23

## 2022-02-09 RX ADMIN — SODIUM BICARBONATE TAB 650 MG 650 MG: 650 TAB at 08:48

## 2022-02-09 RX ADMIN — APIXABAN 5 MG: 5 TABLET, FILM COATED ORAL at 21:23

## 2022-02-09 RX ADMIN — AMLODIPINE BESYLATE 5 MG: 5 TABLET ORAL at 08:48

## 2022-02-09 RX ADMIN — ACETAMINOPHEN 650 MG: 325 TABLET, FILM COATED ORAL at 23:56

## 2022-02-09 RX ADMIN — ACETAMINOPHEN 650 MG: 325 TABLET, FILM COATED ORAL at 14:10

## 2022-02-09 RX ADMIN — SODIUM CHLORIDE, PRESERVATIVE FREE 10 ML: 5 INJECTION INTRAVENOUS at 08:30

## 2022-02-09 RX ADMIN — SODIUM CHLORIDE, PRESERVATIVE FREE 10 ML: 5 INJECTION INTRAVENOUS at 21:23

## 2022-02-09 RX ADMIN — ALLOPURINOL 300 MG: 300 TABLET ORAL at 08:48

## 2022-02-09 RX ADMIN — SODIUM BICARBONATE TAB 650 MG 650 MG: 650 TAB at 13:38

## 2022-02-09 RX ADMIN — ATORVASTATIN CALCIUM 40 MG: 40 TABLET, FILM COATED ORAL at 08:48

## 2022-02-09 RX ADMIN — CLOPIDOGREL BISULFATE 75 MG: 75 TABLET ORAL at 08:48

## 2022-02-09 RX ADMIN — METOPROLOL TARTRATE 50 MG: 50 TABLET ORAL at 08:48

## 2022-02-09 RX ADMIN — BUMETANIDE 2 MG: 1 TABLET ORAL at 21:23

## 2022-02-09 RX ADMIN — SODIUM BICARBONATE TAB 650 MG 650 MG: 650 TAB at 21:23

## 2022-02-09 RX ADMIN — APIXABAN 5 MG: 5 TABLET, FILM COATED ORAL at 08:48

## 2022-02-09 RX ADMIN — ACETAMINOPHEN 650 MG: 325 TABLET, FILM COATED ORAL at 04:28

## 2022-02-09 RX ADMIN — BUMETANIDE 1 MG: 1 TABLET ORAL at 12:35

## 2022-02-09 ASSESSMENT — PAIN SCALES - GENERAL
PAINLEVEL_OUTOF10: 0
PAINLEVEL_OUTOF10: 0
PAINLEVEL_OUTOF10: 4
PAINLEVEL_OUTOF10: 4
PAINLEVEL_OUTOF10: 3
PAINLEVEL_OUTOF10: 2

## 2022-02-09 ASSESSMENT — ENCOUNTER SYMPTOMS
EYE DISCHARGE: 0
ABDOMINAL PAIN: 0
EYE REDNESS: 0
ABDOMINAL DISTENTION: 1
SHORTNESS OF BREATH: 1
NAUSEA: 0
VOMITING: 0
COUGH: 1
SORE THROAT: 0

## 2022-02-09 NOTE — PLAN OF CARE
Problem: Falls - Risk of:  Goal: Will remain free from falls  Description: Will remain free from falls  2/9/2022 0301 by Portia Chaudhry RN  Outcome: Ongoing  Note: No falls noted this shift. Patient ambulates with x1 staff assistance without difficulty. Bed kept in low position. Safe environment maintained. Bedside table & call light in reach. Uses call light appropriately when needing assistance. Problem: Anxiety:  Goal: Level of anxiety will decrease  Description: Level of anxiety will decrease  2/9/2022 0301 by Portia Chaudhry RN  Outcome: Ongoing  Note: Pt has increased anxiety associated with needles. Problem: Skin Integrity:  Goal: Absence of new skin breakdown  Description: Absence of new skin breakdown  2/9/2022 0301 by Portia Chaudhry RN  Outcome: Ongoing  Note: Pt at increased risk of breakdown with extended sitting time. Reminded to reposition.

## 2022-02-09 NOTE — PROGRESS NOTES
Dr. Evans [place] to unit rounding. RN inquired about discontinuing smith catheter. MD states to leave catheter for now and continue diuresis. MD clarifies that catheter was not placed for retention.

## 2022-02-09 NOTE — PROGRESS NOTES
Physician Progress Note      Luis Enrique Brown  CSN #:                  [id number]  :                       1950  ADMIT DATE:       2022 9:32 AM  DISCH DATE:  RESPONDING  PROVIDER #:        Lisandra Layne          QUERY TEXT:    Patient admitted for management of CHF exacerbation. Noted documentation of   Bilateral LE Cellulitis, by Cardiology and Nephrology consults where as the   H&P states the patient has Chronic Venous Stasis without mention of Bilateral   LE Cellulitis. After further review,  please document in progress notes and discharge summary   if you are evaluating and /or treating any of the following: The medical record reflects the following:  Risk Factors: CHF, Obesity, Generalized edema - secondary to nephrotic   syndrome, CKD, DM2, Venous stasis  Clinical Indicators: Wound care consulted to evaluated pts LE swelling &   possible wounds. Per wound care assessment and findings on   \" no open  wounds currently. I found no weeping, no open areas currently. The   erythema/hemosiderin staining is chronic in nature with no signs of infection  present. \". Cardio and Nephro document \"Bilateral LE Cellulitis\", Nephro   suggested patient should be started on Clindamycin. H&P notes \"Erythema, flaking of skin, and ulcerations noted on bilateral lower   extremities. Chronic venous stasis with ulcerations, WC consult. \"  Treatment: Wounds Care consult for evaluation and treatment. Recommends   Compression, and silicone cream. Clindamycin x1 dose on .   Options provided:  -- Bilateral LE Cellulitis and Chronic Venous Stasis  -- Bilateral LE Cellulitis ruled out and Chronic Venous Stasis confirmed  -- Bilateral LE Cellulitis confirmed and Chronic Venous Stasis ruled out  -- Bilateral LE Cellulitis and Chronic Venous Stasis ruled out  -- Other - I will add my own diagnosis  -- Disagree - Not applicable / Not valid  -- Disagree - Clinically unable to determine / Unknown  -- Refer to Clinical Documentation Reviewer    PROVIDER RESPONSE TEXT:    After study, Bilateral LE Cellulitis has been ruled out and Chronic Venous   Stasis confirmed.     Query created by: Chanel Ellis on 2/9/2022 2:11 PM      Electronically signed by:  Sagn Galvin 2/9/2022 3:33 PM

## 2022-02-09 NOTE — PROGRESS NOTES
[address]    PROGRESS NOTE             2/9/2022    7:41 AM    Name:   Donal Hoffman  MRN:     [id number]     Acct:      [de-identified]   Room:   2090/2090-01   Day:  3  Admit Date:  2/6/2022  9:32 AM    PCP:  Ortega Garcia MD  Code Status:  Full Code    Subjective:     C/C:   Chief Complaint   Patient presents with    Shortness of Breath     Interval History Status: improved. Pt was seen and examined. SOB and leg swelling improved,  Patient complains of \"being gassy\"   and diffused itching which she attributes to laying/sitting more than her usual during hospitalization. Pt is asking when she can go home. She also feels FC is uncomfortable but denies dysuria/dificulty urinating. She is no longer feeling dizzy/lightheaded upon waking/sitting up. IV Bumex was decreased yesterday from 2mg BID to 0.5 mg.  Cr continues to rise; will start gentle hydration. O2 requirements decreasing (from 2L O2 to 1L O2); will continue to wean as tolerated (pt's baseline is room air). LE edema is significantly improved. Brief History:     23-year-old female with past medical history of heart failure with reduced ejection fraction (LVEF 45 to 50% in July 2020 secondary to ischemic cardiomyopathy), CAD s/p bypass, type 2 diabetes mellitus, hypertension, meningioma, hard of hearing, visual impairment, CKD stage III (baseline creatinine 1.6 mg/dL, follows with Dr. Raina Monteiro presented on 2/6 complaining of increased shortness of breath, leg swelling, and dizziness. She is admitted for CHF exacerbation. Patient managed with diuresis which improved SOB and LE edema, however pt developed JOY. Cardiology and Nephrology on board. Review of Systems:     Review of Systems   Constitutional: Negative for chills and fever. HENT: Negative for congestion and sore throat. Eyes: Negative for discharge and redness.    Respiratory: Positive for cough (chronic, dry at bedtime and when waking up) and shortness of breath. Cardiovascular: Positive for leg swelling (much better). Negative for chest pain and palpitations. Gastrointestinal: Positive for abdominal distention. Negative for abdominal pain, nausea and vomiting. Genitourinary: Negative for difficulty urinating and dysuria. Musculoskeletal: Negative for myalgias. Skin:        Generalized \"itchiness\"    Neurological: Negative for dizziness and light-headedness. Psychiatric/Behavioral: Negative for sleep disturbance. The patient is nervous/anxious. Medications: Allergies:     Allergies   Allergen Reactions    Latex Rash    Aleve [Naproxen Sodium]      Chronic kidney disease stage III, CHF    Pioglitazone Other (See Comments)     Congestive heart failure    Claritin [Loratadine]     Keflex [Cephalexin]     Lisinopril      Needs clarification of contraindication       Current Meds:   Scheduled Meds:    bumetanide  0.5 mg IntraVENous BID    sodium chloride flush  5-40 mL IntraVENous 2 times per day    amLODIPine  5 mg Oral Daily    apixaban  5 mg Oral BID    metoprolol tartrate  50 mg Oral BID    allopurinol  300 mg Oral Daily    atorvastatin  40 mg Oral Daily    clopidogrel  75 mg Oral Daily    sodium bicarbonate  650 mg Oral TID    insulin lispro  0-6 Units SubCUTAneous TID WC    insulin lispro  0-3 Units SubCUTAneous Nightly    [Held by provider] losartan  25 mg Oral Daily     Continuous Infusions:    sodium chloride      dextrose       PRN Meds: perflutren lipid microspheres, sodium chloride flush, sodium chloride, ondansetron **OR** ondansetron, polyethylene glycol, acetaminophen **OR** acetaminophen, albuterol sulfate HFA, glucose, dextrose, glucagon (rDNA), dextrose, diphenhydrAMINE    Data:     Past Medical History:   has a past medical history of Acute CVA (cerebrovascular accident) ([facility] [address] Altered mental status, Arthritis, Brain mass, Cellulitis and abscess, Cellulitis and abscess of unspecified site, Cellulitis of both lower extremities, Cellulitis of left lower extremity, Cellulitis of lower extremity, CHF (congestive heart failure) ([facility] [address] Chronic kidney disease, unspecified, Coronary atherosclerosis of native coronary artery, Essential hypertension, Essential hypertension, malignant, Hallucinations, Hard of hearing, Head contusion, Hypokalemia, Iron deficiency anemia, unspecified, Meningioma ([facility] [address] Myocardial infarction ([facility] [address] Other and unspecified hyperlipidemia, Pure hypercholesterolemia, Toxic metabolic encephalopathy, Type II or unspecified type diabetes mellitus without mention of complication, not stated as uncontrolled, Unspecified asthma(493.90), Unspecified venous (peripheral) insufficiency, Unspecified vitamin D deficiency, and UTI (urinary tract infection). Social History:   reports that she quit smoking about 22 years ago. She has a 2.50 pack-year smoking history. She has never used smokeless tobacco. She reports previous alcohol use. She reports that she does not use drugs.      Family History:   Family History   Problem Relation Age of Onset    Diabetes Mother     Heart Disease Mother     Heart Disease Father     Diabetes Sister     High Blood Pressure Sister     Diabetes Maternal Grandmother        Vitals:  BP (!) 146/47   Pulse 75   Temp 97.5 °F (36.4 °C) (Axillary)   Resp 20   Ht 4' 11\" (1.499 m)   Wt 156 lb 15.5 oz (71.2 kg)   SpO2 93%   BMI 31.70 kg/m²   Temp (24hrs), Av.9 °F (36.6 °C), Min:97.5 °F (36.4 °C), Max:98.2 °F (36.8 °C)    Recent Labs     22  0640 22  1131 22  1651 22   POCGLU 81 105 97 187*     Vitals:    22 1345 22 1957 22 2326 22 0508   BP: (!) 111/47 (!) 125/48 (!) 146/47    Pulse: 76 87 75    Resp: 20 20 20    Temp: 98.1 °F (36.7 °C) 98.2 °F (36.8 °C) 97.5 °F (36.4 °C)    TempSrc: Oral  Axillary    SpO2: 98% 95% 93%    Weight:    156 lb 15.5 oz (71.2 kg) Height:           I/O(24Hr): Intake/Output Summary (Last 24 hours) at 2/9/2022 0741  Last data filed at 2/9/2022 8014  Gross per 24 hour   Intake 710 ml   Output 1275 ml   Net -565 ml       Labs:  Recent Results (from the past 24 hour(s))   POC Glucose Fingerstick    Collection Time: 02/08/22 11:31 AM   Result Value Ref Range    POC Glucose 105 65 - 105 mg/dL   POC Glucose Fingerstick    Collection Time: 02/08/22  4:51 PM   Result Value Ref Range    POC Glucose 97 65 - 105 mg/dL   POC Glucose Fingerstick    Collection Time: 02/08/22  8:11 PM   Result Value Ref Range    POC Glucose 187 (H) 65 - 105 mg/dL   Basic Metabolic Panel w/ Reflex to MG    Collection Time: 02/09/22  5:52 AM   Result Value Ref Range    Glucose 91 70 - 99 mg/dL    BUN 58 (H) 8 - 23 mg/dL    CREATININE 2.22 (H) 0.50 - 0.90 mg/dL    Bun/Cre Ratio NOT REPORTED 9 - 20    Calcium 8.3 (L) 8.6 - 10.4 mg/dL    Sodium 143 135 - 144 mmol/L    Potassium 5.0 3.7 - 5.3 mmol/L    Chloride 107 98 - 107 mmol/L    CO2 27 20 - 31 mmol/L    Anion Gap 9 9 - 17 mmol/L    GFR Non-African American 22 (L) >60 mL/min    GFR  26 (L) >60 mL/min    GFR Comment          GFR Staging NOT REPORTED    CBC    Collection Time: 02/09/22  5:52 AM   Result Value Ref Range    WBC 8.1 3.5 - 11.0 k/uL    RBC 3.43 (L) 4.0 - 5.2 m/uL    Hemoglobin 9.7 (L) 12.0 - 16.0 g/dL    Hematocrit 31.0 (L) 36 - 46 %    MCV 90.4 80 - 100 fL    MCH 28.2 26 - 34 pg    MCHC 31.2 31 - 37 g/dL    RDW 17.4 (H) 11.5 - 14.9 %    Platelets 014 576 - 672 k/uL    MPV 7.9 6.0 - 12.0 fL    NRBC Automated NOT REPORTED per 100 WBC         Lab Results   Component Value Date/Time    SPECIAL NOT REPORTED 06/11/2021 04:31 PM     Lab Results   Component Value Date/Time    CULTURE KLEBSIELLA PNEUMONIAE >[id number] CFU/ML (A) 06/11/2021 04:31 PM         Radiology:    XR CHEST PORTABLE    Result Date: 2/6/2022  Findings suggesting likely CHF as described.         Physical Examination:        Physical Exam  Constitutional:       Appearance: Normal appearance. Comments: Pt found sitting up in chair,   Had finished her breakfast,  Appears comfortable. Pleasantly conversative. HENT:      Head: Normocephalic and atraumatic. Nose: Nose normal.      Comments: NC in place  Eyes:      General: No scleral icterus. Conjunctiva/sclera: Conjunctivae normal.   Cardiovascular:      Rate and Rhythm: Normal rate and regular rhythm. Pulmonary:      Effort: No respiratory distress. Breath sounds: Rales present. No wheezing or rhonchi. Abdominal:      General: Bowel sounds are normal.      Palpations: Abdomen is soft. Tenderness: There is no abdominal tenderness. There is no guarding. Musculoskeletal:      Comments: Bilateral LE edema significantly improved from prior exams. Skin:     General: Skin is warm and dry. Comments: B/l LE dermatitis noted; No open wounds present. Neurological:      Mental Status: She is alert and oriented to person, place, and time. Mental status is at baseline. Psychiatric:         Mood and Affect: Mood normal.         Behavior: Behavior normal.           Assessment:        Primary Problem  Acute on chronic combined systolic (congestive) and diastolic (congestive) heart failure [facility])    Active Hospital Problems    Diagnosis Date Noted    Chronic venous stasis dermatitis of both lower extremities [I87.2] 02/08/2022    Acute on chronic combined systolic (congestive) and diastolic (congestive) heart failure (HCC) [I50.43] 02/06/2022    Chronic a-fib ([facility] [address] [I48.20] 02/06/2022    Acute kidney injury ([facility] [address] [N17.9] 01/15/2022    HTN.  Benign hypertension with CKD (chronic kidney disease) stage III ([facility] [address] [I12.9, N18.30] 02/20/2020    Impaired hearing [H91.90] 02/20/2020    Gout with tophi [M1A.9XX1] 02/20/2020    COPD (chronic obstructive pulmonary disease) ([facility] [J44.9] 02/20/2020    Type 2 diabetes mellitus with stage 3 chronic kidney disease, without long-term current use of insulin (HCC) [E11.22, N18.30]     Stage 3 chronic kidney disease ([facility] [address] [N18.30]     Coronary artery disease involving native coronary artery of native heart without angina pectoris [I25.10]     Asthma [J45.909]        Plan:        Acute on chronic combined systolic and diastolic CHF  Last echo done 2020 EF 45-50% on TTE and EF 55% on KARLA  Repeat TTE showing EF > 55% , mild mitral & tricuspid regurgitation  Bumetanide IV 0.5 mg BID decreased on 2/8 from 10 mg twice daily 2/2 JOY => will hold Bumex for today and switch to 1 mg p.o. Bumex starting 2/10  Cardiology following; recommend stress test as outpatient  Strict I&O's  Daily weights     Hypertension - improved  BP remains elevated; at 155/50 this a.m.   Increase home Amlodipine po to 10 mg QD (from 5 mg)  Continue home Metoprolol po 50 mg BID  Losartan po 25 mg was held on 2/8 for JOY => consider restarting since CKD consistent with diabetic glomerulopathy per Nephro recommendations      COPD (on RA at baseline) & hx of asthma  Increased O2 needs 2/2 CHF exacerbation; now improved  Continue to wean off oxygen as tolerated     Hx of CAD   On Plavix      History of atrial fibrillation  EKG: Normal sinus rhythm  On Eliquis     JOY - worsened  Creatinine 1.47 > 1.34 > 1.8 > 2.22  Electrolytes ok  Start on gentle IV hydration with NS at 50 mL/h     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  Creatinine baseline 1.6 mg/dL  Nephrology following; will f/u further recommendations given persistent JOY and hypertension     Diabetes Mellitus type 2  BS adequate  11/2021 hemoglobin A1c 5.8  POCT every 4 hours  Low-dose insulin sliding scale  Hypoglycemia protocol     Gout  Continue home allopurinol oral 300 mg daily     Chronic venous stasis dermatitis   Wound care assessed on 2/7; recommendations noted  Kerlix/Ace wrap; can use intermittently  Continue to monitor; likely to improve with improving edema         Code: FULL  DVT prophylaxis: on Lovenox  GI prophylaxis: none  PT OT evaluation  SW discharge planning      Please note: Use of a speech recognition software was used in the creation of portions of this note and dictation errors, including those of syntax and sound alike word substitutions, may have escaped proofreading. Ivan Salazar DO  2/9/2022  7:41 AM     Attending Physician Statement  I have discussed the care of [address] [place] [address] Drive and I have examined the patient myselft and taken ros and hpi , including pertinent history and exam findings,  with the resident. I have reviewed the key elements of all parts of the encounter with the resident. I agree with the assessment, plan and orders as documented by the resident.   Ac on ch Systolic CHF   HTN   JOY on CKD , bumex,  Per nephrology  CAD stable   Afib on eliquis   COPD   DM2,   Venous stasis dermatitis     Electronically signed by Naida Abraham MD

## 2022-02-09 NOTE — CARE COORDINATION
ONGOING DISCHARGE PLANNING:    Spoke with patient and daughter regarding discharge plan. They spoke with the physicians today, and would like [facility]. [facility] that PM&R consult is needed. Creatinine continues to increase and is 2.22 today. Now on PO Bumex 1mg daily. Will continue to follow for additional discharge needs.     Electronically signed by Valora Dubin, RN on 2/9/2022 at 3:47 PM

## 2022-02-10 LAB
ANION GAP SERPL CALCULATED.3IONS-SCNC: 8 MMOL/L (ref 9–17)
BUN BLDV-MCNC: 69 MG/DL (ref 8–23)
BUN/CREAT BLD: ABNORMAL (ref 9–20)
CALCIUM SERPL-MCNC: 8.4 MG/DL (ref 8.6–10.4)
CHLORIDE BLD-SCNC: 104 MMOL/L (ref 98–107)
CO2: 29 MMOL/L (ref 20–31)
CREAT SERPL-MCNC: 2.07 MG/DL (ref 0.5–0.9)
GFR AFRICAN AMERICAN: 29 ML/MIN
GFR NON-AFRICAN AMERICAN: 24 ML/MIN
GFR SERPL CREATININE-BSD FRML MDRD: ABNORMAL ML/MIN/{1.73_M2}
GFR SERPL CREATININE-BSD FRML MDRD: ABNORMAL ML/MIN/{1.73_M2}
GLUCOSE BLD-MCNC: 144 MG/DL (ref 65–105)
GLUCOSE BLD-MCNC: 183 MG/DL (ref 65–105)
GLUCOSE BLD-MCNC: 71 MG/DL (ref 65–105)
GLUCOSE BLD-MCNC: 97 MG/DL (ref 65–105)
GLUCOSE BLD-MCNC: 99 MG/DL (ref 70–99)
HCT VFR BLD CALC: 30.8 % (ref 36–46)
HEMOGLOBIN: 9.8 G/DL (ref 12–16)
MCH RBC QN AUTO: 28.2 PG (ref 26–34)
MCHC RBC AUTO-ENTMCNC: 31.7 G/DL (ref 31–37)
MCV RBC AUTO: 89 FL (ref 80–100)
NRBC AUTOMATED: ABNORMAL PER 100 WBC
PDW BLD-RTO: 16.5 % (ref 11.5–14.9)
PLATELET # BLD: 272 K/UL (ref 150–450)
PMV BLD AUTO: 8.1 FL (ref 6–12)
POTASSIUM SERPL-SCNC: 4.9 MMOL/L (ref 3.7–5.3)
RBC # BLD: 3.46 M/UL (ref 4–5.2)
SODIUM BLD-SCNC: 141 MMOL/L (ref 135–144)
WBC # BLD: 7.8 K/UL (ref 3.5–11)

## 2022-02-10 PROCEDURE — 6370000000 HC RX 637 (ALT 250 FOR IP): Performed by: STUDENT IN AN ORGANIZED HEALTH CARE EDUCATION/TRAINING PROGRAM

## 2022-02-10 PROCEDURE — 82947 ASSAY GLUCOSE BLOOD QUANT: CPT

## 2022-02-10 PROCEDURE — 80048 BASIC METABOLIC PNL TOTAL CA: CPT

## 2022-02-10 PROCEDURE — 6370000000 HC RX 637 (ALT 250 FOR IP): Performed by: INTERNAL MEDICINE

## 2022-02-10 PROCEDURE — 2060000000 HC ICU INTERMEDIATE R&B

## 2022-02-10 PROCEDURE — 85027 COMPLETE CBC AUTOMATED: CPT

## 2022-02-10 PROCEDURE — 36415 COLL VENOUS BLD VENIPUNCTURE: CPT

## 2022-02-10 PROCEDURE — 99222 1ST HOSP IP/OBS MODERATE 55: CPT | Performed by: PHYSICAL MEDICINE & REHABILITATION

## 2022-02-10 PROCEDURE — 99232 SBSQ HOSP IP/OBS MODERATE 35: CPT | Performed by: INTERNAL MEDICINE

## 2022-02-10 PROCEDURE — 6370000000 HC RX 637 (ALT 250 FOR IP)

## 2022-02-10 PROCEDURE — 2580000003 HC RX 258: Performed by: INTERNAL MEDICINE

## 2022-02-10 RX ORDER — BUMETANIDE 1 MG/1
1 TABLET ORAL 2 TIMES DAILY
Qty: 60 TABLET | Refills: 0 | Status: SHIPPED | OUTPATIENT
Start: 2022-02-10 | End: 2022-02-15 | Stop reason: SDUPTHER

## 2022-02-10 RX ORDER — AMLODIPINE BESYLATE 10 MG/1
10 TABLET ORAL DAILY
Qty: 30 TABLET | Refills: 3 | Status: SHIPPED | OUTPATIENT
Start: 2022-02-11 | End: 2022-02-15 | Stop reason: SDUPTHER

## 2022-02-10 RX ORDER — MUPIROCIN CALCIUM 20 MG/G
CREAM TOPICAL
Qty: 30 G | Refills: 3 | Status: SHIPPED | OUTPATIENT
Start: 2022-02-10 | End: 2022-02-15 | Stop reason: SDUPTHER

## 2022-02-10 RX ADMIN — ALLOPURINOL 300 MG: 300 TABLET ORAL at 09:18

## 2022-02-10 RX ADMIN — AMLODIPINE BESYLATE 10 MG: 10 TABLET ORAL at 09:18

## 2022-02-10 RX ADMIN — APIXABAN 5 MG: 5 TABLET, FILM COATED ORAL at 20:33

## 2022-02-10 RX ADMIN — BUMETANIDE 2 MG: 1 TABLET ORAL at 20:33

## 2022-02-10 RX ADMIN — METOPROLOL TARTRATE 50 MG: 50 TABLET ORAL at 20:33

## 2022-02-10 RX ADMIN — ATORVASTATIN CALCIUM 40 MG: 40 TABLET, FILM COATED ORAL at 09:18

## 2022-02-10 RX ADMIN — ACETAMINOPHEN 650 MG: 325 TABLET, FILM COATED ORAL at 17:01

## 2022-02-10 RX ADMIN — APIXABAN 5 MG: 5 TABLET, FILM COATED ORAL at 09:17

## 2022-02-10 RX ADMIN — INSULIN LISPRO 1 UNITS: 100 INJECTION, SOLUTION INTRAVENOUS; SUBCUTANEOUS at 17:06

## 2022-02-10 RX ADMIN — SODIUM CHLORIDE, PRESERVATIVE FREE 10 ML: 5 INJECTION INTRAVENOUS at 20:33

## 2022-02-10 RX ADMIN — CLOPIDOGREL BISULFATE 75 MG: 75 TABLET ORAL at 09:18

## 2022-02-10 RX ADMIN — SODIUM CHLORIDE, PRESERVATIVE FREE 10 ML: 5 INJECTION INTRAVENOUS at 09:00

## 2022-02-10 RX ADMIN — BUMETANIDE 2 MG: 1 TABLET ORAL at 09:17

## 2022-02-10 RX ADMIN — SODIUM BICARBONATE TAB 650 MG 650 MG: 650 TAB at 09:18

## 2022-02-10 RX ADMIN — ACETAMINOPHEN 650 MG: 325 TABLET, FILM COATED ORAL at 06:21

## 2022-02-10 RX ADMIN — INSULIN LISPRO 1 UNITS: 100 INJECTION, SOLUTION INTRAVENOUS; SUBCUTANEOUS at 20:33

## 2022-02-10 RX ADMIN — METOPROLOL TARTRATE 50 MG: 50 TABLET ORAL at 09:17

## 2022-02-10 RX ADMIN — SODIUM BICARBONATE TAB 650 MG 650 MG: 650 TAB at 13:17

## 2022-02-10 ASSESSMENT — ENCOUNTER SYMPTOMS
SHORTNESS OF BREATH: 1
ABDOMINAL PAIN: 0
SORE THROAT: 0
EYE REDNESS: 0
NAUSEA: 0
COUGH: 1
VOMITING: 0
RHINORRHEA: 0

## 2022-02-10 ASSESSMENT — PAIN SCALES - GENERAL
PAINLEVEL_OUTOF10: 0
PAINLEVEL_OUTOF10: 5
PAINLEVEL_OUTOF10: 3
PAINLEVEL_OUTOF10: 4
PAINLEVEL_OUTOF10: 0

## 2022-02-10 NOTE — PROGRESS NOTES
Patient and family requesting covid vaccination,informed Yulissa molina will notify Dr. Radha Tracy.

## 2022-02-10 NOTE — PROGRESS NOTES
Nephrology Progress Note    Subjective/   66y.o. year old female who we are seeing in consultation for Fluid overload and renal insufficiency    Interval History:  Patient seen and examined at bedside, Patient is sitting comfortably, shortness of breath continues to improve    Echocardiogram shows preserved EF of 55%. Moderate LVH. No significant pericardial effusion. Patient is started on losartan. Creatinine did increase to1.8--->2.2--->  patient remains on Bumex 2 mg every 12 hourly. Blood pressure is improving. Bladder scan to r/o urinary retention    History of present illness: This is a 66 y.o. female with a significant past medical history of Failure with reduced ejection fraction [HFrEF with LVEF 45 to 50% in July 2020 secondary to ischemic cardiomyopathy], Coronary artery disease, Meningioma, type 2 diabetes mellitus [with nonproliferative diabetic retinopathy], essential hypertension, bilateral deafness and chronic kidney disease stage III [baseline serum creatinine 1.6 mg/dL and follows up with Dr. Pamella Vega of [facility] [place] whom she saw 1 week ago and was switched from Lasix to Bumex], presented with complaints of dizziness, increasing shortness of breath and leg swelling. Review of serologic work-up including YRIS, ANCA, complements and protein electrophoresis were essentially normal or negative. Chest x-ray performed at presentation showed pulmonary vascular congestion. She was previously on lisinopril and Aldactone and both are currently being held.  Diuretic regimen most recently consists of Bumex 0.5 mg p.o. daily       Objective/     Vitals:    02/10/22 0305 02/10/22 0553 02/10/22 0800 02/10/22 1230   BP: (!) 152/63  (!) 139/50 (!) 125/41   Pulse: 79  79 80   Resp: 20  20 20   Temp: 98.3 °F (36.8 °C)  97.3 °F (36.3 °C) 97.1 °F (36.2 °C)   TempSrc: Tympanic  Axillary Axillary   SpO2:   97% 96%   Weight:  156 lb 15.5 oz (71.2 kg)     Height:         24HR INTAKE/OUTPUT: Intake/Output Summary (Last 24 hours) at 2/10/2022 1629  Last data filed at 2/10/2022 1224  Gross per 24 hour   Intake 955 ml   Output 2225 ml   Net -1270 ml     Patient Vitals for the past 96 hrs (Last 3 readings):   Weight   02/10/22 0553 156 lb 15.5 oz (71.2 kg)   02/09/22 0508 156 lb 15.5 oz (71.2 kg)   02/08/22 0515 156 lb 8.4 oz (71 kg)       Constitutional:  Alert, awake, no apparent distress  Cardiovascular:  S1, S2 without m/r/g  Respiratory: Diminished air entry no rales auscultated. Abdomen: +bs, soft, nt  Ext: 2+ LE edema    Data/  Recent Labs     02/08/22  0544 02/09/22  0552 02/10/22  0549   WBC 8.6 8.1 7.8   HGB 9.3* 9.7* 9.8*   HCT 29.1* 31.0* 30.8*   MCV 90.6 90.4 89.0    251 272     Recent Labs     02/08/22  0544 02/09/22  0552 02/10/22  0549    143 141   K 4.5 5.0 4.9    107 104   CO2 26 27 29   GLUCOSE 89 91 99   BUN 50* 58* 69*   CREATININE 1.80* 2.22* 2.07*   LABGLOM 28* 22* 24*   GFRAA 34* 26* 29*     Normal left ventricle size and function with an estimated EF > 55%. No segmental wall motion abnormalities seen. Moderate left ventricular hypertrophy. Normal right ventricular size and function. Left atrial dilatation. Aortic valve is trileaflet. Mild aortic stenosis. Mild-moderate aortic insufficiency. Normal aortic root dimension. Mitral annular calcification is seen. Mild mitral regurgitation. Mild tricuspid regurgitation. Normal right ventricular systolic pressure. No significant pericardial effusion is seen. Pleural effusion present. Assessment/   1. Generalized edema - This may be secondary to nephrotic syndrome or decompensated heart failure with reduced ejection fraction. Patient has been receiving  Bumex 2 mg twice daily-patient has not achieved significant negative balance.     2.   Nephrotic range proteinuria [urine protein/creatinine ratio 20 g/g] - this may be secondary to diabetic nephropathy but primary glomerular disorders would need to be excluded. Patient has apparently consistently refused kidney biopsy.     3. Systemic hypertension - blood pressure control is suboptimal at this time. May be related to plasma volume expansion- place patient on metoprolol 50 mg p.o. twice daily and monitor blood pressure response -Check renin and aldosterone a.m.     4.  Bilateral leg cellulitis -      5. Chronic kidney disease stage III - most consistent with diabetic glomerulopathy given associated proteinuria and retinopathy. Patient does not have evidence of  angioedema or other complications of ACE inhibitor's. Losartan 25 mg p.o. daily      Plan:      continue oral Bumex 2 mg twice daily   Daily weights.   Indwelling Stanford catheter to monitor strict I's and O's\    [facility], MD

## 2022-02-10 NOTE — PROGRESS NOTES
Notified FAB Cabrera SW, via vm that per Dr Yadiel Meyers pt is currently not approp for ARU as pt is too high LOF at this time.

## 2022-02-10 NOTE — PROGRESS NOTES
[facility]    PROGRESS NOTE             2/10/2022    7:45 AM    Name:   Pepe Medina  MRN:     [id number]     Acct:      [de-identified]   Room:   2090/2090-01  IP Day:  4  Admit Date:  2/6/2022  9:32 AM    PCP:  Rut Frost MD  Code Status:  Full Code    Subjective:     C/C:   Chief Complaint   Patient presents with    Shortness of Breath     Interval History Status: improved. Pt seen and examined. Breathing and leg swelling are improved. Has been doing well on room air, only used O2 for a a few hours this a.m. Discussion held with patient regarding benefits of ARU; Pt initially hesitant due to concerns of rehab being \"a alf sentence,\"  But after discussion, pt is agreeable and wants to go to rehab. Will f/u PMR consult. Brief History:     74-year-old female with past medical history of heart failure with reduced ejection fraction (LVEF 45 to 50% in July 2020 secondary to ischemic cardiomyopathy), CAD s/p bypass, type 2 diabetes mellitus, hypertension, meningioma, hard of hearing, visual impairment, CKD stage III (baseline creatinine 1.6 mg/dL, follows with Dr. Dimas Heading presented on 2/6 complaining of increased shortness of breath, leg swelling, and dizziness. She is admitted for CHF exacerbation. Patient managed with diuresis which improved SOB and LE edema, however pt developed JOY. Cardiology and Nephrology on board. Review of Systems:     Review of Systems   Constitutional: Negative for diaphoresis and fever. HENT: Negative for rhinorrhea and sore throat. Eyes: Negative for redness and visual disturbance. Respiratory: Positive for cough and shortness of breath. Cardiovascular: Positive for leg swelling. Negative for chest pain. Gastrointestinal: Negative for abdominal pain, nausea and vomiting. Incontinence this a.m. Genitourinary: Negative for difficulty urinating.    Musculoskeletal: Positive for myalgias (leg soreness). Negative for arthralgias. Neurological: Negative for dizziness and light-headedness. Psychiatric/Behavioral: Negative for behavioral problems and confusion. Medications: Allergies:     Allergies   Allergen Reactions    Latex Rash    Aleve [Naproxen Sodium]      Chronic kidney disease stage III, CHF    Pioglitazone Other (See Comments)     Congestive heart failure    Claritin [Loratadine]     Keflex [Cephalexin]     Lisinopril      Needs clarification of contraindication       Current Meds:   Scheduled Meds:    amLODIPine  10 mg Oral Daily    bumetanide  2 mg Oral BID    sodium chloride flush  5-40 mL IntraVENous 2 times per day    apixaban  5 mg Oral BID    metoprolol tartrate  50 mg Oral BID    allopurinol  300 mg Oral Daily    atorvastatin  40 mg Oral Daily    clopidogrel  75 mg Oral Daily    sodium bicarbonate  650 mg Oral TID    insulin lispro  0-6 Units SubCUTAneous TID WC    insulin lispro  0-3 Units SubCUTAneous Nightly    [Held by provider] losartan  25 mg Oral Daily     Continuous Infusions:    sodium chloride      dextrose       PRN Meds: perflutren lipid microspheres, sodium chloride flush, sodium chloride, ondansetron **OR** ondansetron, polyethylene glycol, acetaminophen **OR** acetaminophen, albuterol sulfate HFA, glucose, dextrose, glucagon (rDNA), dextrose, diphenhydrAMINE    Data:     Past Medical History:   has a past medical history of Acute CVA (cerebrovascular accident) ([facility] [address] Altered mental status, Arthritis, Brain mass, Cellulitis and abscess, Cellulitis and abscess of unspecified site, Cellulitis of both lower extremities, Cellulitis of left lower extremity, Cellulitis of lower extremity, CHF (congestive heart failure) ([facility] [address] Chronic kidney disease, unspecified, Coronary atherosclerosis of native coronary artery, Essential hypertension, Essential hypertension, malignant, Hallucinations, Hard of hearing, Head contusion, Hypokalemia, Iron deficiency anemia, unspecified, Meningioma ([facility] [address] Myocardial infarction ([facility] [address] Other and unspecified hyperlipidemia, Pure hypercholesterolemia, Toxic metabolic encephalopathy, Type II or unspecified type diabetes mellitus without mention of complication, not stated as uncontrolled, Unspecified asthma(493.90), Unspecified venous (peripheral) insufficiency, Unspecified vitamin D deficiency, and UTI (urinary tract infection). Social History:   reports that she quit smoking about 22 years ago. She has a 2.50 pack-year smoking history. She has never used smokeless tobacco. She reports previous alcohol use. She reports that she does not use drugs. Family History:   Family History   Problem Relation Age of Onset    Diabetes Mother     Heart Disease Mother     Heart Disease Father     Diabetes Sister     High Blood Pressure Sister     Diabetes Maternal Grandmother        Vitals:  BP (!) 152/63   Pulse 79   Temp 98.3 °F (36.8 °C) (Tympanic)   Resp 20   Ht 4' 11\" (1.499 m)   Wt 156 lb 15.5 oz (71.2 kg)   SpO2 97%   BMI 31.70 kg/m²   Temp (24hrs), Av.2 °F (36.8 °C), Min:98.2 °F (36.8 °C), Max:98.3 °F (36.8 °C)    Recent Labs     22  0744 22  1124 22  1624 22   POCGLU 80 101 123* 138*     Vitals:    22 1215 22 2001 02/10/22 0305 02/10/22 0553   BP: (!) 123/55 (!) 142/66 (!) 152/63    Pulse: 79 83 79    Resp: 18 20 20    Temp: 98.2 °F (36.8 °C) 98.2 °F (36.8 °C) 98.3 °F (36.8 °C)    TempSrc: Oral Oral Tympanic    SpO2: 99% 97%     Weight:    156 lb 15.5 oz (71.2 kg)   Height:           I/O(24Hr):     Intake/Output Summary (Last 24 hours) at 2/10/2022 0788  Last data filed at 2/10/2022 0552  Gross per 24 hour   Intake 831 ml   Output 1950 ml   Net -1119 ml       Labs:  Recent Results (from the past 24 hour(s))   POC Glucose Fingerstick    Collection Time: 22 11:24 AM   Result Value Ref Range    POC Glucose 101 65 - 105 mg/dL   POC Glucose Fingerstick    Collection Time: 02/09/22  4:24 PM   Result Value Ref Range    POC Glucose 123 (H) 65 - 105 mg/dL   POC Glucose Fingerstick    Collection Time: 02/09/22  8:12 PM   Result Value Ref Range    POC Glucose 138 (H) 65 - 105 mg/dL   Basic Metabolic Panel w/ Reflex to MG    Collection Time: 02/10/22  5:49 AM   Result Value Ref Range    Glucose 99 70 - 99 mg/dL    BUN 69 (H) 8 - 23 mg/dL    CREATININE 2.07 (H) 0.50 - 0.90 mg/dL    Bun/Cre Ratio NOT REPORTED 9 - 20    Calcium 8.4 (L) 8.6 - 10.4 mg/dL    Sodium 141 135 - 144 mmol/L    Potassium 4.9 3.7 - 5.3 mmol/L    Chloride 104 98 - 107 mmol/L    CO2 29 20 - 31 mmol/L    Anion Gap 8 (L) 9 - 17 mmol/L    GFR Non-African American 24 (L) >60 mL/min    GFR  29 (L) >60 mL/min    GFR Comment          GFR Staging NOT REPORTED    CBC    Collection Time: 02/10/22  5:49 AM   Result Value Ref Range    WBC 7.8 3.5 - 11.0 k/uL    RBC 3.46 (L) 4.0 - 5.2 m/uL    Hemoglobin 9.8 (L) 12.0 - 16.0 g/dL    Hematocrit 30.8 (L) 36 - 46 %    MCV 89.0 80 - 100 fL    MCH 28.2 26 - 34 pg    MCHC 31.7 31 - 37 g/dL    RDW 16.5 (H) 11.5 - 14.9 %    Platelets 325 149 - 433 k/uL    MPV 8.1 6.0 - 12.0 fL    NRBC Automated NOT REPORTED per 100 WBC         Lab Results   Component Value Date/Time    SPECIAL NOT REPORTED 06/11/2021 04:31 PM     Lab Results   Component Value Date/Time    CULTURE KLEBSIELLA PNEUMONIAE >629461 CFU/ML (A) 06/11/2021 04:31 PM       Radiology:    XR CHEST PORTABLE    Result Date: 2/6/2022  Findings suggesting likely CHF as described. Physical Examination:        Physical Exam  Constitutional:       General: She is not in acute distress. Appearance: Normal appearance. She is not diaphoretic. Comments: Pt found sitting up in bed,   Well groomed   HENT:      Head: Normocephalic and atraumatic. Nose: Nose normal.   Eyes:      General: No scleral icterus. Right eye: No discharge. Left eye: No discharge. Conjunctiva/sclera: Conjunctivae normal.   Cardiovascular:      Rate and Rhythm: Normal rate and regular rhythm. Pulmonary:      Effort: Pulmonary effort is normal. No respiratory distress. Breath sounds: No wheezing or rhonchi. Comments: No SOB while speaking,  On room air  Abdominal:      Palpations: Abdomen is soft. Tenderness: There is no abdominal tenderness. There is no guarding. Musculoskeletal:      Right lower leg: Edema present. Left lower leg: Edema present. Skin:     General: Skin is warm and dry. Neurological:      Mental Status: She is alert. Mental status is at baseline. Assessment:        Primary Problem  Acute on chronic combined systolic (congestive) and diastolic (congestive) heart failure [facility])    Active Hospital Problems    Diagnosis Date Noted    Chronic venous stasis dermatitis of both lower extremities [I87.2] 02/08/2022    Acute on chronic combined systolic (congestive) and diastolic (congestive) heart failure (HCC) [I50.43] 02/06/2022    Chronic a-fib ([facility] [address] [I48.20] 02/06/2022    [facility] [N17.9] 01/15/2022    HTN.  Benign hypertension with CKD (chronic kidney disease) stage III ([facility] [address] [I12.9, N18.30] 02/20/2020    Impaired hearing [H91.90] 02/20/2020    Gout with tophi [M1A.9XX1] 02/20/2020    COPD (chronic obstructive pulmonary disease) ([facility]) [J44.9] 02/20/2020    Type 2 diabetes mellitus with stage 3 chronic kidney disease, without long-term current use of insulin ([facility]) [E11.22, N18.30]     Stage 3 chronic kidney disease ([facility] [address] [N18.30]     Coronary artery disease involving native coronary artery of native heart without angina pectoris [I25.10]     Asthma [J45.909]        Plan:        Acute on chronic combined systolic and diastolic CHF  Last echo done 2020 EF 45-50% on TTE and EF 55% on KARLA  Repeat TTE showing EF > 55%  Bumetanide po 2 mg BID  Cardiology following; recommend stress test as outpatient  Strict I&O's  Daily weights     Hypertension   BP suboptimal  Amlodipine po to 10 mg QD (from 5 mg)  Metoprolol po 50 mg BID  Losartan po 25 mg was held on 2/8 for JOY => consider restarting  per Nephro recommendations      COPD (on RA at baseline) & hx of asthma  Increased O2 needs 2/2 CHF exacerbation; now improved  Continue to wean off oxygen as tolerated     Hx of CAD   On Plavix      History of atrial fibrillation  EKG: Normal sinus rhythm  On Eliquis     JOY - improving  Creatinine 1.47 > 1.34 > 1.8 > 2.22 > 2.07  Electrolytes ok  IV NS at 50 mL/h     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  Creatinine baseline 1.6 mg/dL  Nephrology following; recommend FC, oral Bumex 2mg, Losartan, BB     Diabetes Mellitus type 2  BS adequate  11/2021 hemoglobin A1c 5.8  POCT every 4 hours  Low-dose insulin sliding scale  Hypoglycemia protocol     Gout  Continue home allopurinol oral 300 mg daily     Chronic venous stasis dermatitis - improved  Kerlix/Ace wrap; can use intermittently        Code: FULL  DVT prophylaxis: on Lovenox  GI prophylaxis: none  PT OT evaluation  SW discharge planning      Please note: Use of a speech recognition software was used in the creation of portions of this note and dictation errors, including those of syntax and sound alike word substitutions, may have escaped proofreading. Keely Rico DO  2/10/2022  7:45 AM       Attending Physician Statement  I have discussed the care of [address] [place] [address] Drive and I have examined the patient myselft and taken ros and hpi , including pertinent history and exam findings,  with the resident. I have reviewed the key elements of all parts of the encounter with the resident. I agree with the assessment, plan and orders as documented by the resident.       Electronically signed by Lizzy Villanueva MD

## 2022-02-10 NOTE — PROGRESS NOTES
Physician Progress Note      Radha Carpenter  [facility] #:                  [id number]  :                       1950  ADMIT DATE:       2022 9:32 AM  DISCH DATE:  RESPONDING  PROVIDER #:        Ml Villasenor MD          QUERY TEXT:    Patient admitted for treatment and evaluation of Acute on Chronic   systolic/diastolic combined CHF. Follow up progress notes on , s/p Echo,   Cardiology documents a new diagnosis of Acute on chronic diastolic CHF, EF   >53% on Echo. After further review, please document in progress notes and   discharge summary if you are evaluating and/or treating:[[Acute on Chronic   systolic/diastolic combined CHF confirmed, Acute on chronic diastolic CHF   ruled out[de-identified] After study, Acute on Chronic systolic/diastolic combined CHF   confirmed and Acute on chronic diastolic CHF has been ruled out.]    The medical record reflects the following:  Risk Factors: CHF, Hx of Ischemic CM, CAD s/p CABG, HTN, CKD, PAF  Clinical Indicators: Hx of HFrEF 2020 secondary to Ischemic CM, LVEF 45%   in . Pt admitted with c/o increased SOB, LE edema/ dizziness. Cardio consulted on : \"Acute on chronic systolic/diastolic combined CHF,   repeat Echo to reassess LVEF and r/o any valvular heart disease\". Echo : LVEF >55%, moderate LVH, No valve or wall motion abnormalities. :   Cardio follow up note states \"Acute on chronic diastolic CHF- HFpEF-  EF > 55% on Echo 22, ischemic cardiomyopathy- recovered based on Echo\". However on , Internal Med still notes a diagnosis of \"Acute on  Chronic systolic/diastolic combined CHF\". Treatment: IV Diureses, Cardiology & Nephrology consults, Echo, Labs and Xrays   to monitor, Supplemental O2 PRN. Follow up OP Stress per Cardio.   Options provided:  -- Acute on chronic diastolic CHF confirmed, Acute on Chronic   systolic/diastolic combined CHF ruled out  -- Other - I will add my own diagnosis  -- Disagree - Not applicable / Not valid  -- Disagree - Clinically unable to determine / Unknown  -- Refer to Clinical Documentation Reviewer    PROVIDER RESPONSE TEXT:    After study, Acute on chronic diastolic CHF confirmed and Acute on Chronic   systolic/diastolic combined CHF has been ruled out.     Query created by: Ying Jj on 2/9/2022 1:42 PM      Electronically signed by:  Paddy Maier MD 2/10/2022 8:44 AM

## 2022-02-10 NOTE — DISCHARGE SUMMARY
[address]    Discharge Summary     Patient ID: Rakan wKon  :  [birth year]   MRN: [id number]     ACCOUNT:  [de-identified]   Patient's PCP: Iris Molina MD  Admit Date: 2022   Discharge Date: 2022   Length of Stay: 5  Code Status:  Full Code  Admitting Physician: Jefferson Toscano MD  Discharge Physician: Corrie Velez DO     Active Discharge Diagnoses:       Primary Problem  Acute on chronic combined systolic (congestive) and diastolic (congestive) heart failure [facility] Problems    Diagnosis Date Noted    Chronic venous stasis dermatitis of both lower extremities [I87.2] 2022    Acute on chronic combined systolic (congestive) and diastolic (congestive) heart failure ([facility] [I50.43] 2022    Chronic a-fib ([facility] [I48.20] 2022    Acute kidney injury ([facility] [facility] [N17.9] 01/15/2022    HTN.  Benign hypertension with CKD (chronic kidney disease) stage III ([address] [I12.9, N18.30] 2020    Impaired hearing [H91.90] 2020    Gout with tophi [M1A.9XX1] 2020    COPD (chronic obstructive pulmonary disease) (HCC) [J44.9] 2020    Type 2 diabetes mellitus with stage 3 chronic kidney disease, without long-term current use of insulin ([facility]) [E11.22, N18.30]     Stage 3 chronic kidney disease ([facility] [address] [facility] [N18.30]     Coronary artery disease involving native coronary artery of native heart without angina pectoris [I25.10]     Asthma [J45.909]        Admission Condition:  poor     Discharged Condition: good    Hospital Stay:       Hospital Course:      Rakan Kwon is a 29-year-old female with past medical history of heart failure with reduced ejection fraction (LVEF 45 to 50% in 2020 secondary to ischemic cardiomyopathy), CAD s/p bypass, type 2 diabetes mellitus, hypertension, meningioma, hard of hearing, visual impairment, CKD stage III (baseline creatinine 1.6 mg/dL, follows with Dr. Leroy Singh presented on 2/6 complaining of increased shortness of breath, leg swelling, and dizziness and was admitted for CHF exacerbation. Patient managed with diuresis which improved SOB and LE edema, however pt developed JOY. Cardiology and Nephrology on board.   Medications titrated/adjusted daily to find appropriate balance between CHF exacerbation and JOY  In setting of CKD control. Hypertension persistent throughout hospitalization;  Medications also adjusted for proper BP control. Repeat echo done on 2/7 showing EF > 55%. Cardiology recommends outpatient stress test and agrees with nephrology managing diuresis. Today, on day of discharge, patient was seen and examined. She is clinically improved with no new complaints. Creatinine continues to trend down/improve; Pt to have outpatient BMP in 1 week and follow up with Nephrologist in office. Shortness of breath is resolved and patient has been weaned off oxygen. Amlodipine dose increased to 10 mg during hospital course and blood pressure has improved. Lower extremity edema and pulmonary vascular congestion on physical exam are significantly improved. Discussion held on evening of 2/10 with nephrology regarding optimal medications for patient & decision made that patient to be discharged on 1 mg of oral Bumex twice daily, amlodipine 10 mg daily, and that bicarbonate tablets, losartan, and Aldactone are to be discontinued. Patient is medically stable for discharge and all specialists agree with discharge. Plan was initially to discharge patient to SNF so patient could continue with outpatient therapy/rehab;   on case;  Due to insurance coverage issues, affordable SNF option that patient and her family would agree to was not found.   Attempt was made to set up VNS for patient but patient declined;  Per patient's preferences given above-mentioned circumstances,  Patient to be discharged to home with daughter.     Significant therapeutic interventions: Diuresis, continuation of appropriate home medications, antihypertensives, insulin, apixaban and Plavix, PT/OT, O2 supplementation    Significant Diagnostic Studies:   Labs / Micro:  CBC:   Lab Results   Component Value Date    WBC 7.5 02/11/2022    RBC 3.60 02/11/2022    HGB 10.5 02/11/2022    HCT 31.9 02/11/2022    MCV 88.7 02/11/2022    MCH 29.3 02/11/2022    MCHC 33.0 02/11/2022    RDW 16.6 02/11/2022     02/11/2022     BMP:    Lab Results   Component Value Date    GLUCOSE 95 02/11/2022     02/11/2022    K 4.6 02/11/2022     02/11/2022    CO2 29 02/11/2022    ANIONGAP 12 02/11/2022    BUN 71 02/11/2022    CREATININE 1.99 02/11/2022    BUNCRER NOT REPORTED 02/11/2022    CALCIUM 8.6 02/11/2022    LABGLOM 25 02/11/2022    GFRAA 30 02/11/2022    GFR      02/11/2022    GFR NOT REPORTED 02/11/2022     HFP:    Lab Results   Component Value Date    PROT 6.3 02/06/2022     CMP:    Lab Results   Component Value Date    GLUCOSE 95 02/11/2022     02/11/2022    K 4.6 02/11/2022     02/11/2022    CO2 29 02/11/2022    BUN 71 02/11/2022    CREATININE 1.99 02/11/2022    ANIONGAP 12 02/11/2022    ALKPHOS 230 02/06/2022    ALT 22 02/06/2022    AST 32 02/06/2022    BILITOT 0.29 02/06/2022    LABALBU 3.3 02/06/2022    ALBUMIN NOT REPORTED 02/06/2022    LABGLOM 25 02/11/2022    GFRAA 30 02/11/2022    GFR      02/11/2022    GFR NOT REPORTED 02/11/2022    PROT 6.3 02/06/2022    CALCIUM 8.6 02/11/2022     PT/INR:    Lab Results   Component Value Date    PROTIME 16.0 02/06/2022    INR 1.3 02/06/2022     PTT:   Lab Results   Component Value Date    APTT 34.9 02/06/2022     FLP:    Lab Results   Component Value Date    CHOL 135 12/06/2021    TRIG 132 12/06/2021    HDL 59 12/06/2021     U/A:    Lab Results   Component Value Date    COLORU Yellow 02/07/2022    TURBIDITY Clear 02/07/2022    SPECGRAV 1.011 02/07/2022    HGBUR MODERATE 02/07/2022    PHUR 6.0 02/07/2022    PROTEINU 3+ 02/07/2022    GLUCOSEU NEGATIVE 02/07/2022    KETUA NEGATIVE 02/07/2022    BILIRUBINUR NEGATIVE 02/07/2022    UROBILINOGEN Normal 02/07/2022    NITRU NEGATIVE 02/07/2022    LEUKOCYTESUR NEGATIVE 02/07/2022     TSH:    Lab Results   Component Value Date    TSH 2.60 12/06/2021         Radiology:    ECHO Complete 2D W Doppler W Color    Result Date: 2/7/2022  [facility]04 [facility] Transthoracic Echocardiography Report (TTE)  Patient Name [id number]       Date of Study                 02/07/2022               Kindra Ambrose   Date of      1950  Gender                        Female  Birth   Age          66 year(s)  Race                             Room Number  2090        Height:                       58.66 inch, 149 cm   Corporate ID [id number]    Weight:                       132 pounds, 60 kg  #   Patient Acct [de-identified]   BSA:           1.54 m^2       BMI:      27.03  #                                                                kg/m^2   MR #         [id number]      Sonographer                   AlbertSaira   Accession #  [id number]  Interpreting Physician        Christiano Montalvo   Fellow                   Referring Nurse Practitioner   Interpreting             Referring Physician           Jann Duong  Fellow                                                 Verl Meckel  Type of Study   TTE procedure:2D Echocardiogram, M-Mode, Doppler, Color Doppler. Procedure Date Date: 02/07/2022 Start: 11:37 AM Study Location: [facility] Technical Quality: Fair visualization Indications:Congestive heart failure. History / Tech. Comments: CABG DM CKD COPD HTN AF Patient Status: Inpatient Height: 58.66 inches Weight: 132.3 pounds BSA: 1.54 m^2 BMI: 27.03 kg/m^2 Rhythm: Within normal limits Allergies   - Lisinopril. - Keflex. - *Unlisted:(Aleve,Claratin, Pioglitazone). CONCLUSIONS Summary Normal left ventricle size and function with an estimated EF > 55%.  No segmental wall motion abnormalities seen. Moderate left ventricular hypertrophy. Normal right ventricular size and function. Left atrial dilatation. Aortic valve is trileaflet. Mild aortic stenosis. Mild-moderate aortic insufficiency. Normal aortic root dimension. Mitral annular calcification is seen. Mild mitral regurgitation. Mild tricuspid regurgitation. Normal right ventricular systolic pressure. No significant pericardial effusion is seen. Pleural effusion present. Signature ----------------------------------------------------------------------------  Electronically signed by Saira Price(Sonographer) on 02/07/2022 12:16  PM ---------------------------------------------------------------------------- ----------------------------------------------------------------------------  Electronically signed by Ann Marie Morales(Interpreting physician) on  02/07/2022 12:19 PM ---------------------------------------------------------------------------- FINDINGS Left Atrium Left atrial dilatation. Left Ventricle Normal left ventricle size and function with an estimated EF > 55%. No segmental wall motion abnormalities seen. Moderate left ventricular hypertrophy. Right Atrium Right atrial dilatation. Right Ventricle Normal right ventricular size and function. Mitral Valve Mitral annular calcification is seen. Mild mitral regurgitation. Aortic Valve Aortic valve is trileaflet. Mild aortic stenosis. Mild-moderate aortic insufficiency. Tricuspid Valve Normal tricuspid valve structure and function. Mild tricuspid regurgitation. Normal right ventricular systolic pressure. Pulmonic Valve The pulmonic valve is normal in structure. Mild pulmonic insufficiency. Pericardial Effusion No significant pericardial effusion is seen. Pleural Effusion Pleural effusion present. Miscellaneous Normal aortic root dimension. E/e' average 16.5 IVC normal diameter & inspiratory collapse indicating normal RA filling pressure .  M-mode / 2D Measurements & Calculations:   LVIDd:4 cm(3.7 - 5.6 cm)         Diastolic NUOMOB:62 ml  IZPVS:0.94 cm(2.2 - 4.0 cm)      Systolic UHCDOE:93.7 ml  URRR:4.47 cm(0.6 - 1.1 cm)       Aortic Root:2.9 cm(2.0 - 3.7 cm)  LVPWd:1.53 cm(0.6 - 1.1 cm)      LA Dimension: 5.4 cm(1.9 - 4.0 cm)  Fractional Shortenin %       LA volume/Index: 56.5 ml /37m^2  Calculated LVEF (%): 73.75 %     LVOT:2.2 cm   Mitral:                                 Aortic   Valve Area (P1/2-Time): 2.68 cm^2       Peak Velocity: 2.09 m/s  Peak E-Wave: 1.24 m/s                   Mean Velocity: 1.34 m/s  Peak A-Wave: 0.92 m/s                   Peak Gradient: 17.47 mmHg  E/A Ratio: 1.35                         Mean Gradient: 9 mmHg  Peak Gradient: 6.15 mmHg                AI P1/2t: 325 msec  Mean Gradient: 3 mmHg  Deceleration Time: 169 msec             Area (continuity): 1.84 cm^2  P1/2t: 82 msec                          AV VTI: 51.2 cm   Area (continuity): 2.32 cm^2  Mean Velocity: 0.85 m/s   Tricuspid:   Peak TR Velocity: 3.03 m/s  Peak TR Gradient: 36.7236 mmHg  Septal Wall E' velocity:0.07 m/s Lateral Wall E' velocity:0.09 m/s    XR CHEST PORTABLE    Result Date: 2022  EXAMINATION: ONE XRAY VIEW OF THE CHEST 2022 10:03 am COMPARISON: 2021 HISTORY: Reason for Exam: sob FINDINGS: Mild cardiomegaly. Generalized interstitial prominence suggesting vascular congestion. Mild bilateral pleural effusions with associated atelectasis. No pneumothorax. Stable postsurgical changes of CABG. Findings suggesting likely CHF as described. Consultations:    Consults:     Final Specialist Recommendations/Findings:   IP CONSULT TO INTERNAL MEDICINE  IP CONSULT TO CARDIOLOGY  IP CONSULT TO NEPHROLOGY  IP CONSULT TO SOCIAL WORK  IP CONSULT TO PHYSICAL MEDICINE REHAB      The patient was seen and examined on day of discharge and this discharge summary is in conjunction with any daily progress note from day of discharge.     Discharge plan:       Disposition: Home (no home care set up d/t patient's preference. No SNF set up d/t social issues with insurance coverage)    Physician Follow Up: [facility]  [address] [facility]. [address]  In 2 weeks      FRANSISCA [facility], MD  [address] [facility] [place] [address]  [phone]    Schedule an appointment as soon as possible for a visit in 3 weeks      Chandana Che MD  [address] [facility].  [address]  [address] [facility] [address]  [phone]             Requiring Further Evaluation/Follow Up POST HOSPITALIZATION/Incidental Findings: CHF, JOY    Diet: regular diet, cardiac diet, diabetic diet and renal diet    Activity: As tolerated    Instructions to Patient:   Take all medications as prescribed. If symptoms worsen or recur, please return to the ED. Follow-up with PCP in 1 week. Follow-up with Nephrologist in 3 weeks. Follow-up with Cardiologist in 2 weeks; ask about scheduling outpatient stress test.  Get BMP as outpatient in 1 week. Take blood pressure at home daily.     Discharge Medications:      Medication List      CHANGE how you take these medications    amLODIPine 10 MG tablet  Commonly known as: NORVASC  Take 1 tablet by mouth daily  What changed:   · medication strength  · how much to take     bumetanide 1 MG tablet  Commonly known as: BUMEX  Take 1 tablet by mouth 2 times daily  What changed:   · medication strength  · how much to take  · when to take this        CONTINUE taking these medications    albuterol sulfate  (90 Base) MCG/ACT inhaler  Commonly known as: ProAir HFA  Inhale 2 puffs into the lungs every 6 hours as needed for Wheezing or Shortness of Breath (cough)     allopurinol 300 MG tablet  Commonly known as: ZYLOPRIM  Take 1 tablet by mouth daily Per rheumatologist     apixaban 5 MG Tabs tablet  Commonly known as: ELIQUIS  Take 1 tablet by mouth 2 times daily     atorvastatin 40 MG tablet  Commonly known as: LIPITOR  Take 1 tablet by mouth once daily     blood glucose monitor kit and supplies  1 kit by Other route three times daily Dispense Butterfly Elite CBG Device     Blood Pressure Kit  1 kit by Does not apply route three times daily     clopidogrel 75 MG tablet  Commonly known as: PLAVIX  Take 1 tablet by mouth daily     colchicine 0.6 MG tablet  Commonly known as: COLCRYS     desloratadine 5 MG tablet  Commonly known as: CLARINEX  Take 1 tablet by mouth once daily     FreeStyle Lancets Misc  1 each by Does not apply route daily Patient needs to contact office before any further refills will be approved     FREESTYLE LITE strip  Generic drug: blood glucose test strips  1 each by In Vitro route daily As needed. Hibiclens 4 % external liquid  Generic drug: chlorhexidine  for decontamination AND WASH LEGS EVERY DAY     magnesium oxide 400 (241.3 Mg) MG Tabs tablet  Commonly known as: MAG-OX  Take 1 tablet by mouth twice daily     metoprolol tartrate 50 MG tablet  Commonly known as: LOPRESSOR  Take 1 tablet by mouth 2 times daily     miconazole 2 % powder  Commonly known as: MICOTIN  Apply topically 2 times daily UNDER THE SKIN FOLDS LONG TERM     mupirocin 2 % cream  Commonly known as: Bactroban  Apply 2 times dailY X 10 DAYS ON OPEN BLISTERS ON THE LEGS.      therapeutic multivitamin-minerals tablet     vitamin D 1.25 MG ([address] UT) Caps capsule  Commonly known as: ERGOCALCIFEROL  Take 1 capsule by mouth once a week     xeroform petrolatum patch 2\"X2\" Pads external pads  Apply 1 each topically daily        STOP taking these medications    sodium bicarbonate 650 MG tablet           Where to Get Your Medications      These medications were sent to [facility], [place] [address]  [facility], [facility] [address]    Phone: [phone]   · amLODIPine 10 MG tablet  · bumetanide 1 MG tablet  · mupirocin 2 % cream         Electronically signed by   Aida Barr DO  2/11/2022  8:04 PM      Thank you Dr. Marta George MD for the opportunity to be involved in this patient's care. Attending Physician Statement  I have discussed the care of [address] [place] [address] Drive and I have examined the patient myselft and taken ros and hpi , including pertinent history and exam findings,  with the resident. I have reviewed the key elements of all parts of the encounter with the resident. I agree with the DC plan and orders as documented by the resident.       Electronically signed by Blake Chaudhry MD

## 2022-02-10 NOTE — PROGRESS NOTES
SW informed pt will not be accepted in ARU. SW spoke to pt and daughter. Daughter will call this worker with 2 or three SNF choices. Addendum: Pt's daughter provided two choices, [place] and [facility]. Referrals sent.

## 2022-02-10 NOTE — PROGRESS NOTES
Nephrology Progress Note    Subjective/   66y.o. year old female who we are seeing in consultation for Fluid overload and renal insufficiency    Interval History:  Patient seen and examined at bedside, Patient is sitting comfortably, shortness of breath continues to improve    Echocardiogram shows preserved EF of 55%. Moderate LVH. No significant pericardial effusion. Patient is started on losartan. Creatinine did increase to1.8--->2.2  today   patient remains on IV Bumex 2 mg every 12 hourly. Blood pressure is improving. Bladder scan to r/o urinary retention    History of present illness: This is a 66 y.o. female with a significant past medical history of Failure with reduced ejection fraction [HFrEF with LVEF 45 to 50% in July 2020 secondary to ischemic cardiomyopathy], Coronary artery disease, Meningioma, type 2 diabetes mellitus [with nonproliferative diabetic retinopathy], essential hypertension, bilateral deafness and chronic kidney disease stage III [baseline serum creatinine 1.6 mg/dL and follows up with Dr. Michelle Lewis of [facility] [place] whom she saw 1 week ago and was switched from Lasix to Bumex], presented with complaints of dizziness, increasing shortness of breath and leg swelling. Review of serologic work-up including YRIS, ANCA, complements and protein electrophoresis were essentially normal or negative. Chest x-ray performed at presentation showed pulmonary vascular congestion. She was previously on lisinopril and Aldactone and both are currently being held.  Diuretic regimen most recently consists of Bumex 0.5 mg p.o. daily       Objective/     Vitals:    02/08/22 2326 02/09/22 0508 02/09/22 0730 02/09/22 1215   BP: (!) 146/47  (!) 155/50 (!) 123/55   Pulse: 75  82 79   Resp: 20  18 18   Temp: 97.5 °F (36.4 °C)  97.6 °F (36.4 °C) 98.2 °F (36.8 °C)   TempSrc: Axillary  Oral Oral   SpO2: 93%  97% 99%   Weight:  156 lb 15.5 oz (71.2 kg)     Height:         24HR INTAKE/OUTPUT: Intake/Output Summary (Last 24 hours) at 2/9/2022 1958  Last data filed at 2/9/2022 1856  Gross per 24 hour   Intake 766 ml   Output 975 ml   Net -209 ml     Patient Vitals for the past 96 hrs (Last 3 readings):   Weight   02/09/22 0508 156 lb 15.5 oz (71.2 kg)   02/08/22 0515 156 lb 8.4 oz (71 kg)   02/06/22 2338 153 lb (69.4 kg)       Constitutional:  Alert, awake, no apparent distress  Cardiovascular:  S1, S2 without m/r/g  Respiratory: Diminished air entry no rales auscultated. Abdomen: +bs, soft, nt  Ext: 2+ LE edema    Data/  Recent Labs     02/07/22 0623 02/08/22  0544 02/09/22  0552   WBC 7.5 8.6 8.1   HGB 10.3* 9.3* 9.7*   HCT 33.0* 29.1* 31.0*   MCV 89.9 90.6 90.4    247 251     Recent Labs     02/07/22 0623 02/08/22  0544 02/09/22  0552   * 141 143   K 4.3 4.5 5.0   * 106 107   CO2 29 26 27   GLUCOSE 110* 89 91   BUN 42* 50* 58*   CREATININE 1.34* 1.80* 2.22*   LABGLOM 39* 28* 22*   GFRAA 47* 34* 26*     Normal left ventricle size and function with an estimated EF > 55%. No segmental wall motion abnormalities seen. Moderate left ventricular hypertrophy. Normal right ventricular size and function. Left atrial dilatation. Aortic valve is trileaflet. Mild aortic stenosis. Mild-moderate aortic insufficiency. Normal aortic root dimension. Mitral annular calcification is seen. Mild mitral regurgitation. Mild tricuspid regurgitation. Normal right ventricular systolic pressure. No significant pericardial effusion is seen. Pleural effusion present. Assessment/   1. Generalized edema - This may be secondary to nephrotic syndrome or decompensated heart failure with reduced ejection fraction. Patient has been receiving IV Bumex 2 mg twice daily-patient has not achieved significant negative balance.     2.   Nephrotic range proteinuria [urine protein/creatinine ratio 20 g/g] - this may be secondary to diabetic nephropathy but primary glomerular disorders would need to be excluded. Patient has apparently consistently refused kidney biopsy.     3. Systemic hypertension - blood pressure control is suboptimal at this time. May be related to plasma volume expansion- place patient on metoprolol 50 mg p.o. twice daily and monitor blood pressure response -Check renin and aldosterone a.m.     4.  Bilateral leg cellulitis -      5. Chronic kidney disease stage III - most consistent with diabetic glomerulopathy given associated proteinuria and retinopathy. Patient does not have evidence of  angioedema or other complications of ACE inhibitor's. Losartan 25 mg p.o. daily      Plan:    continue oral Bumex 2 mg twice daily   Daily weights.   Indwelling Stanford catheter to monitor strict I's and O's\    Jamir Medellin MD

## 2022-02-10 NOTE — CONSULTS
Physical Medicine & Rehabilitation  Consult Note      Admitting Physician:   Alfredo Em MD    Primary Care Provider:   Nicolás Shaw MD     Reason for Consult:  Acute Inpatient Rehabilitation    Chief Complaint: SOB    History of Present Illness:  Referring Provider is requesting an evaluation for appropriate placement upon discharge from acute care. History from chart review and patient. Manon Mcardle is a 66 y.o. LHD/ambidextrous female admitted to [facility] on 2/6/2022. Patient admitted with shortness of breath, dizziness, and BLE swelling. She was treated with adjustment to her diuretics, BNP noted to be 3726 and CXR consistent with CHF decompensation. Nephrology consulted for management of her CKD and fluid balance. Cardiology consulted for history of ischemic cardiomyopathy, s/p CABG, and paroxysmal atrial fibrillation which is s/p KARLA guided cardioversion. Treating with IV Bumex. Wound care following for chronic venous stasis/cellulitis BLEs. She reports improvement in her SOB and mobility.      Review of Systems:  Constitutional: negative for anorexia, chills, fatigue, fevers, sweats and weight loss  Eyes: negative for redness and visual disturbance  Ears, nose, mouth, throat, and face: negative for earaches, sore throat and tinnitus  Respiratory: positive for cough and shortness of breath  Cardiovascular: negative for chest pain, dyspnea and palpitations  Gastrointestinal: negative for abdominal pain, change in bowel habits, constipation, nausea and vomiting  Genitourinary:negative for dysuria, frequency, hesitancy and urinary incontinence  Integument/breast: negative for pruritus and rash, positive for erythema and impaired skin integrity  Musculoskeletal:negative for stiff joints  Neurological: negative for dizziness, headaches   Behavioral/Psych: negative for decreased appetite, depression and fatigue       Premorbid function:  Modified Independent    Current function:    PT:  Restrictions/Precautions: Fall Risk,General Precautions  Implants present? :  (Pt denies)   Transfers  Sit to Stand: Contact guard assistance  Stand to sit: Contact guard assistance  Ambulation 1  Surface: level tile  Device: Rolling Walker  Assistance: Contact guard assistance  Gait Deviations: Slow Ivania,Decreased step length,Decreased step height  Distance: 200ft  Comments: Patient stayed >92% on 1L of O2 throughout amb. Transfers  Sit to Stand: Contact guard assistance  Stand to sit: Contact guard assistance  Ambulation  Ambulation?: Yes  Ambulation 1  Surface: level tile  Device: Rolling Walker  Assistance: Contact guard assistance  Gait Deviations: Slow Ivania,Decreased step length,Decreased step height  Distance: 200ft  Comments: Patient stayed >92% on 1L of O2 throughout amb. Surface: level tile  Ambulation 1  Surface: level tile  Device: Rolling Walker  Assistance: Contact guard assistance  Gait Deviations: Slow Ivania,Decreased step length,Decreased step height  Distance: 200ft  Comments: Patient stayed >92% on 1L of O2 throughout amb. OT:   ADL  Feeding: Setup  Grooming: Setup  UE Bathing: Stand by assistance  LE Bathing: Minimal assistance  UE Dressing: Stand by assistance  LE Dressing: Dependent/Total (socks only addressed; donning/doffing A. )  Toileting: Contact guard assistance (for gown mgmt, pt comletes hygiene while seated post BM. )  Additional Comments: smith catheter present. Pt daughter reports pt completed shower earlier this date with assist from PCT who provided set up/clean up assist and SBA throughout for safety. Pt fatigued following self care session however able to manage. Balance  Sitting Balance: Supervision (EOB, toilet. )  Standing Balance: Contact guard assistance (SBA-CGA)   Standing Balance  Time: <1 min, 1-2 min   Activity: functional mobility with RW, sinkside standing for hand hygiene.    Comment: no LOB, cues for safety with RW technique/placement. Functional Mobility  Functional - Mobility Device: Rolling Walker  Activity: To/from bathroom  Assist Level: Contact guard assistance  Functional Mobility Comments: Assist for line/smith mgmt, cues for safety with RW mgmt; fair carryover. Bed mobility  Rolling to Left: Stand by assistance  Rolling to Right: Stand by assistance  Supine to Sit: Stand by assistance (increased time, use of bed rail, elevated HOB)  Sit to Supine: Moderate assistance (to assist BLEs over EOB. )  Scootin Person assistance,Moderate assistance (towards [facility])  Comment: SBA for scooting towards EOB in seated position. Transfers  Sit to stand: Contact guard assistance (from heightened EOB)  Stand to sit: Contact guard assistance  Transfer Comments: Verbal cues for hand placement and safety   Toilet Transfers  Toilet - Technique: Ambulating  Equipment Used: Standard toilet (with B toilet rails. )  Toilet Transfer: Minimal assistance  Toilet Transfers Comments: Pt requires CGA to safely descend to lower self onto toilet, min A to stand from lower toilet. Pt strongly utilizes B rails.               SLP:      Past Medical History:        Diagnosis Date    Acute CVA (cerebrovascular accident) ([facility] [address] 2020    Altered mental status 2021    Arthritis     Brain mass     Cellulitis and abscess     Cellulitis and abscess of unspecified site     Cellulitis of both lower extremities 2021    Cellulitis of left lower extremity 2020    Cellulitis of lower extremity 10/4/2020    CHF (congestive heart failure) (HCC)     Chronic kidney disease, unspecified     Coronary atherosclerosis of native coronary artery     Essential hypertension 2020    Essential hypertension, malignant     Hallucinations 2021    Hard of hearing     Head contusion 2020    Hypokalemia 2020    Iron deficiency anemia, unspecified     Meningioma ([address] 2021    Myocardial infarction ([address]     Other and unspecified hyperlipidemia     Pure hypercholesterolemia     Toxic metabolic encephalopathy 2/06/9656    Type II or unspecified type diabetes mellitus without mention of complication, not stated as uncontrolled     Unspecified asthma(493.90)     Unspecified venous (peripheral) insufficiency     Unspecified vitamin D deficiency     UTI (urinary tract infection) 2/18/2021       Past Surgical History:        Procedure Laterality Date    CORONARY ARTERY BYPASS GRAFT      x 3, Dr. Jalen Badillo         Allergies:     Allergies   Allergen Reactions    Latex Rash    Aleve [Naproxen Sodium]      Chronic kidney disease stage III, CHF    Pioglitazone Other (See Comments)     Congestive heart failure    Claritin [Loratadine]     Keflex [Cephalexin]     Lisinopril      Needs clarification of contraindication        Current Medications:   Current Facility-Administered Medications: amLODIPine (NORVASC) tablet 10 mg, 10 mg, Oral, Daily  bumetanide (BUMEX) tablet 2 mg, 2 mg, Oral, BID  perflutren lipid microspheres (DEFINITY) injection 2.2 mg, 2 mL, IntraVENous, ONCE PRN  sodium chloride flush 0.9 % injection 5-40 mL, 5-40 mL, IntraVENous, 2 times per day  sodium chloride flush 0.9 % injection 5-40 mL, 5-40 mL, IntraVENous, PRN  0.9 % sodium chloride infusion, 25 mL, IntraVENous, PRN  ondansetron (ZOFRAN-ODT) disintegrating tablet 4 mg, 4 mg, Oral, Q8H PRN **OR** ondansetron (ZOFRAN) injection 4 mg, 4 mg, IntraVENous, Q6H PRN  polyethylene glycol (GLYCOLAX) packet 17 g, 17 g, Oral, Daily PRN  acetaminophen (TYLENOL) tablet 650 mg, 650 mg, Oral, Q6H PRN **OR** acetaminophen (TYLENOL) suppository 650 mg, 650 mg, Rectal, Q6H PRN  apixaban (ELIQUIS) tablet 5 mg, 5 mg, Oral, BID  metoprolol tartrate (LOPRESSOR) tablet 50 mg, 50 mg, Oral, BID  albuterol sulfate  (90 Base) MCG/ACT inhaler 2 puff, 2 puff, Inhalation, Q4H PRN  allopurinol (ZYLOPRIM) tablet 300 mg, 300 mg, Oral, Daily  atorvastatin (LIPITOR) tablet 40 mg, 40 mg, Oral, Daily  clopidogrel (PLAVIX) tablet 75 mg, 75 mg, Oral, Daily  sodium bicarbonate tablet 650 mg, 650 mg, Oral, TID  insulin lispro (HUMALOG) injection vial 0-6 Units, 0-6 Units, SubCUTAneous, TID WC  insulin lispro (HUMALOG) injection vial 0-3 Units, 0-3 Units, SubCUTAneous, Nightly  glucose (GLUTOSE) 40 % oral gel 15 g, 15 g, Oral, PRN  dextrose 50 % IV solution, 12.5 g, IntraVENous, PRN  glucagon (rDNA) injection 1 mg, 1 mg, IntraMUSCular, PRN  dextrose 5 % solution, 100 mL/hr, IntraVENous, PRN  diphenhydrAMINE (BENADRYL) injection 25 mg, 25 mg, IntraVENous, Nightly PRN  [Held by provider] losartan (COZAAR) tablet 25 mg, 25 mg, Oral, Daily    Social History:  Lives With: Son  Type of Home: House  Home Layout: Two level,Bed/Bath upstairs,1/2 bath on main level (Pt sleeps on couch on main level)  Home Access: Stairs to enter with rails  Entrance Stairs - Number of Steps: 3  Entrance Stairs - Rails: Left  Bathroom Shower/Tub:  (Washes up at sink on main level)  Bathroom Toilet: Standard  Bathroom Equipment: Toilet raiser (with rails)  Bathroom Accessibility: Not accessible  Home Equipment: Rolling walker,Reacher  Receives Help From: Family  ADL Assistance: Independent  Homemaking Assistance: Independent (Shares with son)  Ambulation Assistance: Independent (with RW)  Transfer Assistance: Independent  Active : No  Patient's  Info: Son or daughter drives  IADL Comments: Pt reports that she has not slept in a bed in over 35 years. Sleeps on couch in reclined position  Additional Comments: Pt repots that son works full time during the day and is able to assist as needed. Pt reports that 2 daughters live close by and are able to assist as needed. Social History     Socioeconomic History    Marital status:       Spouse name: None    Number of children: None    Years of education: None    Highest education level: None   Occupational History    None   Tobacco Use    Smoking status: Former Smoker     Packs/day: 0.25     Years: 10.00     Pack years: 2.50     Quit date: 2000     Years since quittin.1    Smokeless tobacco: Never Used   Vaping Use    Vaping Use: Never used   Substance and Sexual Activity    Alcohol use: Not Currently     Alcohol/week: 0.0 standard drinks    Drug use: No    Sexual activity: None   Other Topics Concern    None   Social History Narrative    None     Social Determinants of Health     Financial Resource Strain: Low Risk     Difficulty of Paying Living Expenses: Not very hard   Food Insecurity: No Food Insecurity    Worried About Running Out of Food in the Last Year: Never true    Holden of Food in the Last Year: Never true   Transportation Needs:     Lack of Transportation (Medical): Not on file    Lack of Transportation (Non-Medical):  Not on file   Physical Activity:     Days of Exercise per Week: Not on file    Minutes of Exercise per Session: Not on file   Stress:     Feeling of Stress : Not on file   Social Connections:     Frequency of Communication with Friends and Family: Not on file    Frequency of Social Gatherings with Friends and Family: Not on file    Attends Orthodoxy Services: Not on file    Active Member of [address] [place] [address] [employer] or Organizations: Not on file    Attends Club or Organization Meetings: Not on file    Marital Status: Not on file   Intimate Partner Violence:     Fear of Current or Ex-Partner: Not on file    Emotionally Abused: Not on file    Physically Abused: Not on file    Sexually Abused: Not on file   Housing Stability:     Unable to Pay for Housing in the Last Year: Not on file    Number of [place] in the Last Year: Not on file    Unstable Housing in the Last Year: Not on file       Family History:       Problem Relation Age of Onset    Diabetes Mother     Heart Disease Mother     Heart Disease Father     Diabetes Sister     High Blood Pressure Sister     Diabetes Maternal Grandmother        Diagnostics:    CXR 2/6/22  FINDINGS:   Mild cardiomegaly.  Generalized interstitial prominence suggesting vascular   congestion.  Mild bilateral pleural effusions with associated atelectasis. No pneumothorax.  Stable postsurgical changes of CABG.         Impression   Findings suggesting likely CHF as described. CBC:   Recent Labs     02/08/22  0544 02/09/22  0552 02/10/22  0549   WBC 8.6 8.1 7.8   RBC 3.22* 3.43* 3.46*   HGB 9.3* 9.7* 9.8*   HCT 29.1* 31.0* 30.8*   MCV 90.6 90.4 89.0   RDW 16.7* 17.4* 16.5*    251 272     BMP:   Recent Labs     02/08/22  0544 02/09/22  0552 02/10/22  0549    143 141   K 4.5 5.0 4.9    107 104   CO2 26 27 29   BUN 50* 58* 69*   CREATININE 1.80* 2.22* 2.07*   GLUCOSE 89 91 99      HbA1c:   Lab Results   Component Value Date    LABA1C 5.8 11/24/2021     BNP: No results for input(s): BNP in the last 72 hours. PT/INR: No results for input(s): PROTIME, INR in the last 72 hours. APTT: No results for input(s): APTT in the last 72 hours. CARDIAC ENZYMES: No results for input(s): CKMB, CKMBINDEX, TROPONINT in the last 72 hours. Invalid input(s): CKTOTAL;3 troponins    FASTING LIPID PANEL:  Lab Results   Component Value Date    CHOL 135 12/06/2021    HDL 59 12/06/2021    TRIG 132 12/06/2021     LIVER PROFILE: No results for input(s): AST, ALT, ALB, BILIDIR, BILITOT, ALKPHOS in the last 72 hours. Physical Exam:  BP (!) 139/50   Pulse 79   Temp 97.3 °F (36.3 °C) (Axillary)   Resp 20   Ht 4' 11\" (1.499 m)   Wt 156 lb 15.5 oz (71.2 kg)   SpO2 97%   BMI 31.70 kg/m²     GEN: Well developed, well nourished, no acute distress. HEENT: NCAT, PERRL, EOMI, mucous membranes pink and moist.  RESP: Diminished breath sounds. Lungs clear to auscultation. Scattered rales, no rhonchi. Respirations WNL and unlabored. CV: Regular rate rhythm. No murmurs, rubs, or gallops. ABD: soft, non-distended, non-tender.  BS+ and equal.  NEURO: A&O x 3. Sensation intact to light touch. DTRs 2+  MSK: Functional ROM. Muscle tone and bulk are normal bilaterally. Strength 5/5 key muscles BUEs, 4+/5 key muscles BLEs. EXT: No calf tenderness to palpation. Pitting edema BLEs with diffuse erythema and scaly skin distal BLEs. SKIN: Warm dry and intact with good turgor. PSYCH: Mood WNL. Affect WNL. Appropriately interactive. Impression:  1. Debility secondary to CHF decompensation: on Bumex  2. CKD III: Nephrology following  3. HTN/ischemic cardiomyopathy: on Losartan  4. Paroxysmal atrial fibrillation  5. Chronic venous stasis/cellulitis BLEs    Recommendations:    1. Diagnosis:  Debility secondary to CHF decompensation  2. Therapy: Has PT/OT needs  3. Medical Necessity: As above  4. Support: Has supportive family  5. Rehab Recommendation: Patient is currently at functional goals for discharge from acute rehab. If she requires inpatient rehabilitation care, and can't discharge home with home health would recommend SNF. 6. DVT Prophylaxis: on Eliquis    It was my pleasure to evaluate [address] today. Please call with questions. Smita Serrano MD        This note is created with the assistance of a speech recognition program.  While intending to generate a document that actually reflects the content of the visit, the document can still have some errors including those of syntax and sound a like substitutions which may escape proof reading. In such instances, actual meaning can be extrapolated by contextual diversion.

## 2022-02-11 VITALS
OXYGEN SATURATION: 93 % | DIASTOLIC BLOOD PRESSURE: 58 MMHG | TEMPERATURE: 97.7 F | HEIGHT: 59 IN | RESPIRATION RATE: 18 BRPM | HEART RATE: 78 BPM | BODY MASS INDEX: 31.47 KG/M2 | WEIGHT: 156.09 LBS | SYSTOLIC BLOOD PRESSURE: 105 MMHG

## 2022-02-11 LAB
ANION GAP SERPL CALCULATED.3IONS-SCNC: 12 MMOL/L (ref 9–17)
BUN BLDV-MCNC: 71 MG/DL (ref 8–23)
BUN/CREAT BLD: ABNORMAL (ref 9–20)
CALCIUM SERPL-MCNC: 8.6 MG/DL (ref 8.6–10.4)
CHLORIDE BLD-SCNC: 100 MMOL/L (ref 98–107)
CO2: 29 MMOL/L (ref 20–31)
CREAT SERPL-MCNC: 1.99 MG/DL (ref 0.5–0.9)
GFR AFRICAN AMERICAN: 30 ML/MIN
GFR NON-AFRICAN AMERICAN: 25 ML/MIN
GFR SERPL CREATININE-BSD FRML MDRD: ABNORMAL ML/MIN/{1.73_M2}
GFR SERPL CREATININE-BSD FRML MDRD: ABNORMAL ML/MIN/{1.73_M2}
GLUCOSE BLD-MCNC: 106 MG/DL (ref 65–105)
GLUCOSE BLD-MCNC: 203 MG/DL (ref 65–105)
GLUCOSE BLD-MCNC: 85 MG/DL (ref 65–105)
GLUCOSE BLD-MCNC: 95 MG/DL (ref 70–99)
HCT VFR BLD CALC: 31.9 % (ref 36–46)
HEMOGLOBIN: 10.5 G/DL (ref 12–16)
MCH RBC QN AUTO: 29.3 PG (ref 26–34)
MCHC RBC AUTO-ENTMCNC: 33 G/DL (ref 31–37)
MCV RBC AUTO: 88.7 FL (ref 80–100)
NRBC AUTOMATED: ABNORMAL PER 100 WBC
PDW BLD-RTO: 16.6 % (ref 11.5–14.9)
PLATELET # BLD: 275 K/UL (ref 150–450)
PMV BLD AUTO: 8.5 FL (ref 6–12)
POTASSIUM SERPL-SCNC: 4.6 MMOL/L (ref 3.7–5.3)
RBC # BLD: 3.6 M/UL (ref 4–5.2)
SODIUM BLD-SCNC: 141 MMOL/L (ref 135–144)
WBC # BLD: 7.5 K/UL (ref 3.5–11)

## 2022-02-11 PROCEDURE — 97116 GAIT TRAINING THERAPY: CPT

## 2022-02-11 PROCEDURE — 6370000000 HC RX 637 (ALT 250 FOR IP)

## 2022-02-11 PROCEDURE — 6370000000 HC RX 637 (ALT 250 FOR IP): Performed by: INTERNAL MEDICINE

## 2022-02-11 PROCEDURE — 36415 COLL VENOUS BLD VENIPUNCTURE: CPT

## 2022-02-11 PROCEDURE — 82947 ASSAY GLUCOSE BLOOD QUANT: CPT

## 2022-02-11 PROCEDURE — 6370000000 HC RX 637 (ALT 250 FOR IP): Performed by: STUDENT IN AN ORGANIZED HEALTH CARE EDUCATION/TRAINING PROGRAM

## 2022-02-11 PROCEDURE — 85027 COMPLETE CBC AUTOMATED: CPT

## 2022-02-11 PROCEDURE — 97535 SELF CARE MNGMENT TRAINING: CPT

## 2022-02-11 PROCEDURE — 97530 THERAPEUTIC ACTIVITIES: CPT

## 2022-02-11 PROCEDURE — 2580000003 HC RX 258: Performed by: INTERNAL MEDICINE

## 2022-02-11 PROCEDURE — 99239 HOSP IP/OBS DSCHRG MGMT >30: CPT | Performed by: INTERNAL MEDICINE

## 2022-02-11 PROCEDURE — 80048 BASIC METABOLIC PNL TOTAL CA: CPT

## 2022-02-11 RX ADMIN — METOPROLOL TARTRATE 50 MG: 50 TABLET ORAL at 09:08

## 2022-02-11 RX ADMIN — AMLODIPINE BESYLATE 10 MG: 10 TABLET ORAL at 09:08

## 2022-02-11 RX ADMIN — BUMETANIDE 2 MG: 1 TABLET ORAL at 09:08

## 2022-02-11 RX ADMIN — ATORVASTATIN CALCIUM 40 MG: 40 TABLET, FILM COATED ORAL at 09:07

## 2022-02-11 RX ADMIN — INSULIN LISPRO 2 UNITS: 100 INJECTION, SOLUTION INTRAVENOUS; SUBCUTANEOUS at 12:08

## 2022-02-11 RX ADMIN — ALLOPURINOL 300 MG: 300 TABLET ORAL at 09:08

## 2022-02-11 RX ADMIN — ACETAMINOPHEN 650 MG: 325 TABLET, FILM COATED ORAL at 00:29

## 2022-02-11 RX ADMIN — SODIUM CHLORIDE, PRESERVATIVE FREE 10 ML: 5 INJECTION INTRAVENOUS at 09:10

## 2022-02-11 RX ADMIN — APIXABAN 5 MG: 5 TABLET, FILM COATED ORAL at 09:07

## 2022-02-11 RX ADMIN — ACETAMINOPHEN 650 MG: 325 TABLET, FILM COATED ORAL at 09:08

## 2022-02-11 RX ADMIN — CLOPIDOGREL BISULFATE 75 MG: 75 TABLET ORAL at 09:08

## 2022-02-11 ASSESSMENT — PAIN SCALES - GENERAL
PAINLEVEL_OUTOF10: 3
PAINLEVEL_OUTOF10: 4
PAINLEVEL_OUTOF10: 4

## 2022-02-11 NOTE — PROGRESS NOTES
IV removed and all belongings collected, discharge instructiuons explained to patient with daughter at bedside. Pt wheeled out by wheelchair with daughter.

## 2022-02-11 NOTE — PROGRESS NOTES
[facility]: [facility]   INPATIENT OCCUPATIONAL THERAPY  PROGRESS NOTE  Date: 2022  Patient Name: Haleigh Escalera      Room:   MRN: [id number]    : 1950  (72 y.o.) Gender: female     Discharge Recommendations:  Further Occupational Therapy is recommended upon facility discharge. Equipment Needed: Yes    Referring Practitioner: Judge Renée MD  Diagnosis: Acute on chronic combined systolic and diastolic heart failure  General  Chart Reviewed: Yes  Patient assessed for rehabilitation services?: Yes  Family / Caregiver Present: Yes (daughter)  Referring Practitioner: Judge Renée MD  Diagnosis: Acute on chronic combined systolic and diastolic heart failure    Restrictions  Restrictions/Precautions: Fall Risk,General Precautions  Implants present? :  (Pt denies)  Required Braces or Orthoses?: No      Subjective  Subjective: pt states that she didn't sleep well overnight 2* anxiety. pt worried about possible future dialysis  Comments: Pt pleasant and cooperative.  G effort given throughout tx, daughter in room throughout tx  Patient Currently in Pain: Denies  Overall Orientation Status: Within Functional Limits  Patient Observation  Observations: limited sight 2* cataracts per pt report, occasional dry cough noted       Objective  Cognition  Overall Cognitive Status: WFL  Bed mobility  Supine to Sit: Stand by assistance (increased time, use of bed rail, elevated HOB)  Sit to Supine: Unable to assess (pt up in chair)  Scooting: Contact guard assistance (towards EOB)  Comment: pt unsteady at times c 1 minor posterior LOB noted c pt self correct, occasional 1 UE support on bed rail during dynamic reaching  Balance  Sitting Balance: Supervision (EOB, toilet. )  Standing Balance: Contact guard assistance (SBA-CGA)  Standing Balance  Time: ~2 minutes, 4 minutes c RW  Activity: functional mobility  Comment: no LOB noted, 1 standing RB during hallway mobility 2* fatigue   Functional Mobility  Functional - Mobility Device: Rolling Walker  Activity: Other (room/hallway mobility)  Assist Level: Contact guard assistance (CGA-SBA)  Functional Mobility Comments: no LOB noted, 1 standing RB during hallway mobility 2* fatigue    ADL  Feeding: Setup  Grooming: Setup  UE Bathing: Stand by assistance  LE Bathing: Minimal assistance  UE Dressing: Minimal assistance  LE Dressing: Maximum assistance  Toileting: Contact guard assistance (CGA in stand, pt completes hygiene sitting per pt report)  Additional Comments: pt completes grooming, UE bething sitting in chair, pt fatgues quickly,      Transfers  Sit to stand: Contact guard assistance  Stand to sit: Contact guard assistance  Transfer Comments: Verbal cues for hand placement and safety             Assessment  Performance deficits / Impairments: Decreased ADL status; Decreased functional mobility ; Decreased strength;Decreased safe awareness;Decreased endurance;Decreased balance;Decreased high-level IADLs  Prognosis: Good  Discharge Recommendations: Patient would benefit from continued therapy after discharge  Activity Tolerance: Patient Tolerated treatment well;Patient limited by fatigue  Safety Devices in place: Yes  Type of devices: All fall risk precautions in place;Call light within reach; Left in chair (daughters present in room. )  Equipment Recommendations  Equipment Needed: Yes  Raised Toilet Seat: With Grab Bars  Shower Seat: With a back  Reacher: x  Sock Aid: x  Other: long handled sponge          Patient Education: OT POC, RW safety and tech during functional mobility and transfers, activity promotion     Learner:patient  Method: demonstration and explanation       Outcome: acknowledged understanding and demonstrated understanding     Plan  Safety Devices  Safety Devices in place: Yes  Type of devices:  All fall risk precautions in place,Call light within reach,Left in chair (daughters present in room. )  Plan  Times per week: 4-6  Current Treatment Recommendations: Self-Care / ADL,Strengthening,Balance Training,Functional Mobility Training,Endurance Training,Pain Management,Safety Education & Training,Patient/Caregiver Education & Training,Equipment Evaluation, Education, & procurement,Home Management Training      Goals  Short term goals  Time Frame for Short term goals: By discharge   Short term goal 1: Pt will complete BADLs with supervision and good safety  Short term goal 2: Pt will complete functional transfers/mobility during self care tasks with supervision and good safety while maintaining SpO2 above 90%  Short term goal 3: Pt will tolerate standing 5+ minutes during functional activities to increase safety and independence with self care and mobility  Short term goal 4: Pt will verbalize/demonstrate good understanding of home safety and energy conservation strategies to increase safety and independence with self care and mobility  Short term goal 5: Pt will participate in 15+ minutes of therapeutic exercises/functional activities to increase safety and independence with self care and mobility    OT Individual Minutes  Time In: [facility] [employer] [facility]  Time Out: [facility]  Minutes: 31      Electronically signed by EMILY Hammond on 2/11/22 at 10:09 AM EST

## 2022-02-11 NOTE — PROGRESS NOTES
Nephrology Progress Note    Subjective/   66y.o. year old female who we are seeing in consultation for Fluid overload and renal insufficiency    Interval History:  Patient seen and examined at bedside, Patient is sitting comfortably, shortness of breath continues to improve    Echocardiogram shows preserved EF of 55%. Moderate LVH. No significant pericardial effusion. Patient is  on losartan. Creatinine did increase to1.8--->2.2--->1.9  patient remains on Bumex 2 mg every 12 hourly. Blood pressure is improving. Bladder scan to r/o urinary retention    History of present illness: This is a 66 y.o. female with a significant past medical history of Failure with reduced ejection fraction [HFrEF with LVEF 45 to 50% in July 2020 secondary to ischemic cardiomyopathy], Coronary artery disease, Meningioma, type 2 diabetes mellitus [with nonproliferative diabetic retinopathy], essential hypertension, bilateral deafness and chronic kidney disease stage III [baseline serum creatinine 1.6 mg/dL and follows up with Dr. Simon Barron of renal services of [facility] whom she saw 1 week ago and was switched from Lasix to Bumex], presented with complaints of dizziness, increasing shortness of breath and leg swelling. Review of serologic work-up including YRIS, ANCA, complements and protein electrophoresis were essentially normal or negative. Chest x-ray performed at presentation showed pulmonary vascular congestion. She was previously on lisinopril and Aldactone and both are currently being held.  Diuretic regimen most recently consists of Bumex 0.5 mg p.o. daily       Objective/     Vitals:    02/11/22 0543 02/11/22 0600 02/11/22 0731 02/11/22 1331   BP:   (!) 151/54 (!) 105/58   Pulse:   78 78   Resp:   20 18   Temp:   97.9 °F (36.6 °C) 97.7 °F (36.5 °C)   TempSrc:   Oral Oral   SpO2:   93% 93%   Weight: 156 lb 1.4 oz (70.8 kg) 156 lb 1.4 oz (70.8 kg)     Height:         24HR INTAKE/OUTPUT:      Intake/Output Summary (Last 24 hours) at 2/11/2022 1346  Last data filed at 2/11/2022 1210  Gross per 24 hour   Intake 965 ml   Output 1425 ml   Net -460 ml     Patient Vitals for the past 96 hrs (Last 3 readings):   Weight   02/11/22 0600 156 lb 1.4 oz (70.8 kg)   02/11/22 0543 156 lb 1.4 oz (70.8 kg)   02/10/22 1930 158 lb 11.7 oz (72 kg)       Constitutional:  Alert, awake, no apparent distress  Cardiovascular:  S1, S2 without m/r/g  Respiratory: Diminished air entry no rales auscultated. Abdomen: +bs, soft, nt  Ext: 2+ LE edema    Data/  Recent Labs     02/09/22 0552 02/10/22  0549 02/11/22 0619   WBC 8.1 7.8 7.5   HGB 9.7* 9.8* 10.5*   HCT 31.0* 30.8* 31.9*   MCV 90.4 89.0 88.7    272 275     Recent Labs     02/09/22  0552 02/10/22  0549 02/11/22 0619    141 141   K 5.0 4.9 4.6    104 100   CO2 27 29 29   GLUCOSE 91 99 95   BUN 58* 69* 71*   CREATININE 2.22* 2.07* 1.99*   LABGLOM 22* 24* 25*   GFRAA 26* 29* 30*     Normal left ventricle size and function with an estimated EF > 55%. No segmental wall motion abnormalities seen. Moderate left ventricular hypertrophy. Normal right ventricular size and function. Left atrial dilatation. Aortic valve is trileaflet. Mild aortic stenosis. Mild-moderate aortic insufficiency. Normal aortic root dimension. Mitral annular calcification is seen. Mild mitral regurgitation. Mild tricuspid regurgitation. Normal right ventricular systolic pressure. No significant pericardial effusion is seen. Pleural effusion present. Assessment/   1. Generalized edema - This may be secondary to nephrotic syndrome or decompensated heart failure with reduced ejection fraction. Patient has been receiving  Bumex 2 mg twice daily-patient has not achieved significant negative balance.     2. Nephrotic range proteinuria [urine protein/creatinine ratio 20 g/g] - this may be secondary to diabetic nephropathy but primary glomerular disorders would need to be excluded.   Patient has apparently consistently refused kidney biopsy.     3. Systemic hypertension - blood pressure control is improving at this time. May be related to plasma volume expansion- place patient on metoprolol 50 mg p.o. twice daily meds Adjusted, Amlodipine 10 mg daily. Continue losartan     4.  Bilateral leg cellulitis -      5. Chronic kidney disease stage III - most consistent with diabetic glomerulopathy given associated proteinuria and retinopathy. Patient does not have evidence of  angioedema or other complications of ACE inhibitor's. Losartan 25 mg p.o. daily      Plan:      continue oral Bumex 2 mg twice daily , continue Amlodipine, losartan,  Daily weights.   Indwelling Stanford catheter to monitor strict I's and O's\  No objections on discharge  F/u Out patient    Amelia Ribeiro MD

## 2022-02-11 NOTE — PROGRESS NOTES
Physical Therapy  Facility/Department: [facility] PROGRESSIVE CARE  Daily Treatment Note  NAME: Joe Gregorio  :   MRN: [id number]    Date of Service: 2022    Discharge Recommendations:  Continue to assess pending progress   PT Equipment Recommendations  Equipment Needed: No    Assessment   Body structures, Functions, Activity limitations: Decreased functional mobility ; Decreased balance;Decreased endurance  History: CVA  REQUIRES PT FOLLOW UP: Yes  Activity Tolerance  Activity Tolerance: Patient limited by endurance     Patient Diagnosis(es): The primary encounter diagnosis was Acute on chronic combined systolic (congestive) and diastolic (congestive) heart failure ([address] Diagnoses of Hypoxia, Cellulitis of lower extremity, unspecified laterality, and Acute kidney injury ([address] were also pertinent to this visit. has a past medical history of Acute CVA (cerebrovascular accident) ([address] Altered mental status, Arthritis, Brain mass, Cellulitis and abscess, Cellulitis and abscess of unspecified site, Cellulitis of both lower extremities, Cellulitis of left lower extremity, Cellulitis of lower extremity, CHF (congestive heart failure) ([address] Chronic kidney disease, unspecified, Coronary atherosclerosis of native coronary artery, Essential hypertension, Essential hypertension, malignant, Hallucinations, Hard of hearing, Head contusion, Hypokalemia, Iron deficiency anemia, unspecified, Meningioma ([address] Myocardial infarction ([address] Other and unspecified hyperlipidemia, Pure hypercholesterolemia, Toxic metabolic encephalopathy, Type II or unspecified type diabetes mellitus without mention of complication, not stated as uncontrolled, Unspecified asthma(493.90), Unspecified venous (peripheral) insufficiency, Unspecified vitamin D deficiency, and UTI (urinary tract infection).    has a past surgical history that includes Tonsillectomy and adenoidectomy and Coronary artery bypass with rail/SBA    Plan    Plan  Times per week: 5-6x/wk  Current Treatment Recommendations: Balance Training,Functional Mobility Training,Transfer Training,Gait Training,ADL/Self-care [employer] [facility] Education [employer] Training  Safety Devices  Type of devices: Patient at risk for falls,Gait belt,Left in chair        02/11/22 1038   PT Individual Minutes   Time In 0923   Time Out 1001   Minutes 38     Electronically signed by Bonifacio Diaz PTA on 2/11/22 at 10:45 AM EST

## 2022-02-11 NOTE — PROGRESS NOTES
Pt does not have a SNF benefit. We did discuss medicaid and paying privately for SNF. They declined these options. Pt will go home with daughter. KARLY asked daughter to follow up with Ledy Peterson at the [place] on Monday. KARLY did speak to Ledy Peterson. Pt's  would have had coverage, not pt.

## 2022-02-11 NOTE — PLAN OF CARE
Problem: Falls - Risk of:  Goal: Will remain free from falls  Description: Will remain free from falls  Outcome: Ongoing  Note: Pt has not had any episodes of falls this shift. Problem: Falls - Risk of:  Goal: Absence of physical injury  Description: Absence of physical injury  Outcome: Ongoing     Problem: Cardiac:  Goal: Ability to maintain vital signs within normal range will improve  Description: Ability to maintain vital signs within normal range will improve  Outcome: Ongoing  Note: Vital signs have remained in normal range this shift.       Problem: Cardiac:  Goal: Cardiovascular alteration will improve  Description: Cardiovascular alteration will improve  Outcome: Ongoing     Problem: Physical Regulation:  Goal: Complications related to the disease process, condition or treatment will be avoided or minimized  Description: Complications related to the disease process, condition or treatment will be avoided or minimized  Outcome: Ongoing     Problem: Breathing Pattern - Ineffective:  Goal: Ability to achieve and maintain a regular respiratory rate will improve  Description: Ability to achieve and maintain a regular respiratory rate will improve  Outcome: Ongoing

## 2022-02-11 NOTE — CARE COORDINATION
DISCHARGE PLANNING NOTE:    Spoke with patient and daughter regarding discharge. In the even that she does not get approved for SNF, they do NOT want VNS. They state pt does not like strangers in her home.     Electronically signed by Lucian Rolle RN on 2/11/2022 at 3:39 PM

## 2022-02-14 LAB
ALDOSTERONE COMMENT: NORMAL
ALDOSTERONE: 8.8 NG/DL
RENIN ACTIVITY: 49.4 NG/ML/HR
RENIN COMMENT: NORMAL

## 2022-02-15 DIAGNOSIS — M1A.9XX1 GOUT WITH TOPHI: ICD-10-CM

## 2022-02-15 DIAGNOSIS — E11.22 TYPE 2 DIABETES MELLITUS WITH STAGE 3 CHRONIC KIDNEY DISEASE, WITHOUT LONG-TERM CURRENT USE OF INSULIN (HCC): ICD-10-CM

## 2022-02-15 DIAGNOSIS — B37.2 CANDIDAL INTERTRIGO: ICD-10-CM

## 2022-02-15 DIAGNOSIS — I10 ESSENTIAL HYPERTENSION, MALIGNANT: ICD-10-CM

## 2022-02-15 DIAGNOSIS — I25.10 CORONARY ARTERY DISEASE INVOLVING NATIVE CORONARY ARTERY OF NATIVE HEART WITHOUT ANGINA PECTORIS: ICD-10-CM

## 2022-02-15 DIAGNOSIS — J44.9 CHRONIC OBSTRUCTIVE PULMONARY DISEASE, UNSPECIFIED COPD TYPE (HCC): ICD-10-CM

## 2022-02-15 DIAGNOSIS — I12.9 BENIGN HYPERTENSION WITH CKD (CHRONIC KIDNEY DISEASE) STAGE III (HCC): ICD-10-CM

## 2022-02-15 DIAGNOSIS — E55.9 VITAMIN D DEFICIENCY: ICD-10-CM

## 2022-02-15 DIAGNOSIS — N18.30 BENIGN HYPERTENSION WITH CKD (CHRONIC KIDNEY DISEASE) STAGE III (HCC): ICD-10-CM

## 2022-02-15 DIAGNOSIS — I48.0 PAROXYSMAL ATRIAL FIBRILLATION (HCC): Primary | ICD-10-CM

## 2022-02-15 DIAGNOSIS — E78.5 HYPERLIPIDEMIA WITH TARGET LDL LESS THAN 70: ICD-10-CM

## 2022-02-15 DIAGNOSIS — J30.89 ALLERGIC RHINITIS DUE TO OTHER ALLERGIC TRIGGER, UNSPECIFIED SEASONALITY: ICD-10-CM

## 2022-02-15 DIAGNOSIS — N18.30 TYPE 2 DIABETES MELLITUS WITH STAGE 3 CHRONIC KIDNEY DISEASE, WITHOUT LONG-TERM CURRENT USE OF INSULIN (HCC): ICD-10-CM

## 2022-02-15 DIAGNOSIS — E11.8 TYPE 2 DIABETES MELLITUS WITH COMPLICATION (HCC): ICD-10-CM

## 2022-02-15 DIAGNOSIS — I10 ESSENTIAL HYPERTENSION: ICD-10-CM

## 2022-02-15 DIAGNOSIS — L03.119 CELLULITIS OF LOWER EXTREMITY, UNSPECIFIED LATERALITY: ICD-10-CM

## 2022-02-15 DIAGNOSIS — L03.116 CELLULITIS OF LEFT LOWER EXTREMITY: ICD-10-CM

## 2022-02-15 RX ORDER — COLCHICINE 0.6 MG/1
0.6 TABLET ORAL DAILY
Qty: 30 TABLET | Status: CANCELLED | OUTPATIENT
Start: 2022-02-15

## 2022-02-15 RX ORDER — DESLORATADINE 5 MG/1
TABLET ORAL
Qty: 90 TABLET | Refills: 3 | Status: SHIPPED | OUTPATIENT
Start: 2022-02-15

## 2022-02-15 RX ORDER — BUMETANIDE 1 MG/1
1 TABLET ORAL 2 TIMES DAILY
Qty: 180 TABLET | Refills: 3 | Status: SHIPPED | OUTPATIENT
Start: 2022-02-15 | End: 2022-05-25 | Stop reason: SDUPTHER

## 2022-02-15 RX ORDER — METOPROLOL TARTRATE 50 MG/1
50 TABLET, FILM COATED ORAL 2 TIMES DAILY
Qty: 180 TABLET | Refills: 3 | Status: ON HOLD | OUTPATIENT
Start: 2022-02-15 | End: 2022-06-15 | Stop reason: HOSPADM

## 2022-02-15 RX ORDER — MUPIROCIN CALCIUM 20 MG/G
CREAM TOPICAL
Qty: 30 G | Refills: 3 | Status: SHIPPED | OUTPATIENT
Start: 2022-02-15 | End: 2022-03-17

## 2022-02-15 RX ORDER — LANCETS 28 GAUGE
1 EACH MISCELLANEOUS DAILY
Qty: 90 EACH | Refills: 3 | Status: SHIPPED | OUTPATIENT
Start: 2022-02-15

## 2022-02-15 RX ORDER — ALBUTEROL SULFATE 90 UG/1
2 AEROSOL, METERED RESPIRATORY (INHALATION) EVERY 6 HOURS PRN
Qty: 8 G | Refills: 3 | Status: SHIPPED | OUTPATIENT
Start: 2022-02-15 | End: 2022-02-22 | Stop reason: SDUPTHER

## 2022-02-15 RX ORDER — GAUZE BANDAGE 4"X3.5"
1 SPONGE TOPICAL DAILY
Qty: 30 EACH | Refills: 2 | Status: CANCELLED | OUTPATIENT
Start: 2022-02-15

## 2022-02-15 RX ORDER — M-VIT,TX,IRON,MINS/CALC/FOLIC 27MG-0.4MG
1 TABLET ORAL DAILY
Qty: 90 TABLET | Refills: 3 | Status: SHIPPED | OUTPATIENT
Start: 2022-02-15

## 2022-02-15 RX ORDER — CLOPIDOGREL BISULFATE 75 MG/1
75 TABLET ORAL DAILY
Qty: 90 TABLET | Refills: 3 | Status: ON HOLD | OUTPATIENT
Start: 2022-02-15 | End: 2022-03-16 | Stop reason: HOSPADM

## 2022-02-15 RX ORDER — ALLOPURINOL 300 MG/1
300 TABLET ORAL DAILY
Qty: 90 TABLET | Refills: 3 | Status: ON HOLD | OUTPATIENT
Start: 2022-02-15 | End: 2022-06-09 | Stop reason: HOSPADM

## 2022-02-15 RX ORDER — COLCHICINE 0.6 MG/1
TABLET ORAL
Qty: 13 TABLET | Refills: 0 | Status: ON HOLD | OUTPATIENT
Start: 2022-02-15 | End: 2022-03-16 | Stop reason: HOSPADM

## 2022-02-15 RX ORDER — AMLODIPINE BESYLATE 10 MG/1
10 TABLET ORAL DAILY
Qty: 90 TABLET | Refills: 3 | Status: ON HOLD | OUTPATIENT
Start: 2022-02-15 | End: 2022-03-16 | Stop reason: HOSPADM

## 2022-02-15 RX ORDER — CHLORHEXIDINE GLUCONATE 213 G/1000ML
SOLUTION TOPICAL
Qty: 946 ML | Refills: 2 | Status: SHIPPED | OUTPATIENT
Start: 2022-02-15 | End: 2022-06-08

## 2022-02-15 RX ORDER — ATORVASTATIN CALCIUM 40 MG/1
TABLET, FILM COATED ORAL
Qty: 90 TABLET | Refills: 3 | Status: SHIPPED | OUTPATIENT
Start: 2022-02-15

## 2022-02-15 NOTE — TELEPHONE ENCOUNTER
Please Approve or Refuse.   Send to Pharmacy per Pt's Request:      Next Visit Date:  2/22/2022   Last Visit Date: 1/7/2022    Hemoglobin A1C (%)   Date Value   11/24/2021 5.8   06/29/2021 5.7   03/12/2021 5.9             ( goal A1C is < 7)   BP Readings from Last 3 Encounters:   02/11/22 (!) 105/58   01/25/22 130/66   01/07/22 126/70          (goal 120/80)  BUN   Date Value Ref Range Status   02/11/2022 71 (H) 8 - 23 mg/dL Final     CREATININE   Date Value Ref Range Status   02/11/2022 1.99 (H) 0.50 - 0.90 mg/dL Final     Potassium   Date Value Ref Range Status   02/11/2022 4.6 3.7 - 5.3 mmol/L Final

## 2022-02-22 ENCOUNTER — HOSPITAL ENCOUNTER (OUTPATIENT)
Age: 72
Discharge: HOME | End: 2022-02-22
Payer: MEDICARE

## 2022-02-22 ENCOUNTER — OFFICE VISIT (OUTPATIENT)
Dept: FAMILY MEDICINE CLINIC | Age: 72
End: 2022-02-22
Payer: MEDICARE

## 2022-02-22 VITALS
DIASTOLIC BLOOD PRESSURE: 60 MMHG | BODY MASS INDEX: 31.53 KG/M2 | HEART RATE: 62 BPM | HEIGHT: 59 IN | TEMPERATURE: 97.2 F | SYSTOLIC BLOOD PRESSURE: 110 MMHG | OXYGEN SATURATION: 98 %

## 2022-02-22 DIAGNOSIS — E11.22 TYPE 2 DIABETES MELLITUS WITH STAGE 3A CHRONIC KIDNEY DISEASE, WITHOUT LONG-TERM CURRENT USE OF INSULIN (HCC): ICD-10-CM

## 2022-02-22 DIAGNOSIS — H26.9 ACQUIRED CATARACT: ICD-10-CM

## 2022-02-22 DIAGNOSIS — I25.10 CORONARY ARTERY DISEASE INVOLVING NATIVE CORONARY ARTERY OF NATIVE HEART WITHOUT ANGINA PECTORIS: ICD-10-CM

## 2022-02-22 DIAGNOSIS — N18.31 TYPE 2 DIABETES MELLITUS WITH STAGE 3A CHRONIC KIDNEY DISEASE, WITHOUT LONG-TERM CURRENT USE OF INSULIN (HCC): ICD-10-CM

## 2022-02-22 DIAGNOSIS — R30.0 DYSURIA: ICD-10-CM

## 2022-02-22 DIAGNOSIS — N17.9 ACUTE KIDNEY INJURY SUPERIMPOSED ON CKD (HCC): ICD-10-CM

## 2022-02-22 DIAGNOSIS — I50.33 ACUTE ON CHRONIC DIASTOLIC CHF (CONGESTIVE HEART FAILURE) (HCC): ICD-10-CM

## 2022-02-22 DIAGNOSIS — I12.9 BENIGN HYPERTENSION WITH CKD (CHRONIC KIDNEY DISEASE) STAGE III (HCC): ICD-10-CM

## 2022-02-22 DIAGNOSIS — J44.9 CHRONIC OBSTRUCTIVE PULMONARY DISEASE, UNSPECIFIED COPD TYPE (HCC): ICD-10-CM

## 2022-02-22 DIAGNOSIS — N18.9 ACUTE KIDNEY INJURY SUPERIMPOSED ON CKD (HCC): ICD-10-CM

## 2022-02-22 DIAGNOSIS — I48.0 PAROXYSMAL ATRIAL FIBRILLATION (HCC): ICD-10-CM

## 2022-02-22 DIAGNOSIS — I87.2 VENOUS INSUFFICIENCY OF BOTH LOWER EXTREMITIES: ICD-10-CM

## 2022-02-22 DIAGNOSIS — I50.33 ACUTE ON CHRONIC DIASTOLIC CHF (CONGESTIVE HEART FAILURE) (HCC): Primary | ICD-10-CM

## 2022-02-22 DIAGNOSIS — N18.30 BENIGN HYPERTENSION WITH CKD (CHRONIC KIDNEY DISEASE) STAGE III (HCC): ICD-10-CM

## 2022-02-22 DIAGNOSIS — Z91.81 RISK FOR FALLS: ICD-10-CM

## 2022-02-22 LAB
ANION GAP SERPL CALCULATED.3IONS-SCNC: 13 MMOL/L (ref 9–17)
BACTERIA: NORMAL
BILIRUBIN URINE: NEGATIVE
BUN BLDV-MCNC: 87 MG/DL (ref 8–23)
CALCIUM SERPL-MCNC: 8.9 MG/DL (ref 8.6–10.4)
CASTS UA: NORMAL /LPF
CHLORIDE BLD-SCNC: 103 MMOL/L (ref 98–107)
CO2: 23 MMOL/L (ref 20–31)
COLOR: YELLOW
CREAT SERPL-MCNC: 1.69 MG/DL (ref 0.5–0.9)
EPITHELIAL CELLS UA: NORMAL /HPF
GFR AFRICAN AMERICAN: 36 ML/MIN
GFR NON-AFRICAN AMERICAN: 30 ML/MIN
GFR SERPL CREATININE-BSD FRML MDRD: ABNORMAL ML/MIN/{1.73_M2}
GLUCOSE BLD-MCNC: 105 MG/DL (ref 70–99)
GLUCOSE URINE: ABNORMAL
KETONES, URINE: NEGATIVE
LEUKOCYTE ESTERASE, URINE: NEGATIVE
NITRITE, URINE: NEGATIVE
PH UA: 5 (ref 5–8)
POTASSIUM SERPL-SCNC: 4.1 MMOL/L (ref 3.7–5.3)
PROTEIN UA: ABNORMAL
RBC UA: NORMAL /HPF
SODIUM BLD-SCNC: 139 MMOL/L (ref 135–144)
SPECIFIC GRAVITY UA: 1.02 (ref 1–1.03)
TURBIDITY: CLEAR
URINE HGB: ABNORMAL
UROBILINOGEN, URINE: NORMAL
WBC UA: NORMAL /HPF

## 2022-02-22 PROCEDURE — 1111F DSCHRG MED/CURRENT MED MERGE: CPT | Performed by: FAMILY MEDICINE

## 2022-02-22 PROCEDURE — 36415 COLL VENOUS BLD VENIPUNCTURE: CPT

## 2022-02-22 PROCEDURE — 80048 BASIC METABOLIC PNL TOTAL CA: CPT

## 2022-02-22 PROCEDURE — 87086 URINE CULTURE/COLONY COUNT: CPT

## 2022-02-22 PROCEDURE — 81001 URINALYSIS AUTO W/SCOPE: CPT

## 2022-02-22 PROCEDURE — 99214 OFFICE O/P EST MOD 30 MIN: CPT | Performed by: FAMILY MEDICINE

## 2022-02-22 RX ORDER — ALBUTEROL SULFATE 90 UG/1
2 AEROSOL, METERED RESPIRATORY (INHALATION) EVERY 6 HOURS PRN
Qty: 8 G | Refills: 3 | Status: SHIPPED | OUTPATIENT
Start: 2022-02-22 | End: 2022-06-17 | Stop reason: SDUPTHER

## 2022-02-22 RX ORDER — ALBUTEROL SULFATE 2.5 MG/3ML
2.5 SOLUTION RESPIRATORY (INHALATION) EVERY 6 HOURS PRN
Qty: 125 EACH | Refills: 0 | Status: ON HOLD | OUTPATIENT
Start: 2022-02-22 | End: 2022-07-11 | Stop reason: HOSPADM

## 2022-02-22 RX ORDER — CANE
EACH MISCELLANEOUS
Qty: 1 EACH | Refills: 0 | Status: SHIPPED | OUTPATIENT
Start: 2022-02-22

## 2022-02-22 ASSESSMENT — ENCOUNTER SYMPTOMS
CONSTIPATION: 0
SHORTNESS OF BREATH: 1
ABDOMINAL DISTENTION: 0
BLOOD IN STOOL: 0
ABDOMINAL PAIN: 0
WHEEZING: 0
BACK PAIN: 1
CHEST TIGHTNESS: 0
COLOR CHANGE: 1
ROS SKIN COMMENTS: MILD ERYTHEMA
COUGH: 1
RECTAL PAIN: 0

## 2022-02-22 NOTE — PROGRESS NOTES
Post-Discharge Transitional Care Follow Up      Maryam Ojeda   YOB: 1950    Date of Office Visit:  2/22/2022  Date of Hospital Admission: 2/6/22  Date of Hospital Discharge: 2/11/22  Readmission Risk Score (high >=14%. Medium >=10%):Readmission Risk Score: 19.7 ( )      Care management risk score Rising risk (score 2-5) and Complex Care (Scores >=6): 5     Non face to face  following discharge, date last encounter closed (first attempt may have been earlier): *No documented post hospital discharge outreach found in the last 14 days     Call initiated 2 business days of discharge: *No response recorded in the last 14 days     Acute on chronic diastolic CHF (congestive heart failure) ([facility]  -    At her baseline, follow-up with cardiologist for outpatient stress test       Basic Metabolic Panel; Future  -     [place] DISCHARGE MEDS RECONCILED W/ CURRENT OUTPATIENT MED LIST  -     Handicap Placard MISC; Starting Tue 2/22/2022, Disp-1 each, R-0, PrintExpires on 12/30/25  Type 2 diabetes mellitus with stage 3a chronic kidney disease, without long-term current use of insulin ([facility] [address]  Controlled continue to monitor  -     [place] DISCHARGE MEDS RECONCILED W/ CURRENT OUTPATIENT MED LIST  -     Handicap Placard MISC; Starting Tue 2/22/2022, Disp-1 each, R-0, PrintExpires on 12/30/25  Chronic obstructive pulmonary disease, unspecified COPD type ([facility] [address] [facility]  -   Start on aerosol treatments    Nebulizers (COMPRESSOR/NEBULIZER) MISC; Disp-1 each, R-3, PrintNebulizer with compressor. Disposable Med Nebs 2 /month. Reusable Med Nebs 1 per 6 months. Aerosol Mask 1 per month. Replacement Filters 2 per month. All other related supplies as needed per month. Medications being used: Albuterol . 083 unit dose vial. Frequency: every 6 hrs x 4/day . Length of Need: 1  -     albuterol (PROVENTIL) (2.5 MG/3ML) 0.083% nebulizer solution;  Take 3 mLs by nebulization every 6 hours as needed for Wheezing or Shortness of Breath, Disp-125 each, echocardiogram done that showed diastolic dysfunction with normal ejection fraction. She was treated medically and also received oxygen at hospital.    Bumex was increased twice daily, amlodipine was increased to 10 mg and Aldactone was discontinued. Creatinine was elevated from baseline, needs repeat BMP. Patient currently lives with her son and also daughter comes to help her. She refused home health. She needs cane to help her with balance. She is high risk for falls. Patient has COPD needs refill on inhalers, daughter reports she feels congested, and his blood production. She was using albuterol but has stopped. She does not have nebulizer. Diabetes controlled, not on any medications. Patient has chronic venous stasis, does not use stockings daily. Has mild redness and dryness and bilateral leg swelling. Patient uses Bactroban topically. Patient has appointment with cardiologist in March, needs outpatient stress test.    Also has appointment scheduled with nephrologist.      Shelly Fields is a 65-year-old female with past medical history of heart failure with reduced ejection fraction (LVEF 45 to 50% in July 2020 secondary to ischemic cardiomyopathy), CAD s/p bypass, type 2 diabetes mellitus, hypertension, meningioma, hard of hearing, visual impairment, CKD stage III (baseline creatinine 1.6 mg/dL, follows with Dr. Lambert Perez presented on 2/6 complaining of increased shortness of breath, leg swelling, and dizziness and was admitted for CHF exacerbation. Patient managed with diuresis which improved SOB and LE edema, however pt developed JOY. Cardiology and Nephrology on board.   Medications titrated/adjusted daily to find appropriate balance between CHF exacerbation and JOY  In setting of CKD control. Hypertension persistent throughout hospitalization;  Medications also adjusted for proper BP control.     Repeat echo done on 2/7 showing EF > 55%.   Cardiology recommends outpatient stress test and agrees with nephrology managing diuresis.     Today, on day of discharge, patient was seen and examined. She is clinically improved with no new complaints. Creatinine continues to trend down/improve; Pt to have outpatient BMP in 1 week and follow up with Nephrologist in office. Shortness of breath is resolved and patient has been weaned off oxygen. Amlodipine dose increased to 10 mg during hospital course and blood pressure has improved. Lower extremity edema and pulmonary vascular congestion on physical exam are significantly improved.     Discussion held on evening of 2/10 with nephrology regarding optimal medications for patient & decision made that patient to be discharged on 1 mg of oral Bumex twice daily, amlodipine 10 mg daily, and that bicarbonate tablets, losartan, and Aldactone are to be discontinued.     Patient is medically stable for discharge and all specialists agree with discharge.     Plan was initially to discharge patient to SNF so patient could continue with outpatient therapy/rehab;   on case;  Due to insurance coverage issues, affordable SNF option that patient and her family would agree to was not found. Attempt was made to set up VNS for patient but patient declined;  Per patient's preferences given above-mentioned circumstances,  Patient to be discharged to home with daughter. Inpatient course: Discharge summary reviewed- see chart.     Interval history/Current status: at her baseline     Patient Active Problem List   Diagnosis    Vitamin D deficiency    Venous insufficiency of both lower extremities    Asthma    Type 2 diabetes mellitus with stage 3 chronic kidney disease, without long-term current use of insulin ([address]    Coronary artery disease involving native coronary artery of native heart without angina pectoris    Iron deficiency anemia    Stage 3 chronic kidney disease ([address]    Colonoscopy refused    Pneumococcal vaccination declined    Impaired hearing    Chronic diastolic CHF (congestive heart failure) (HCC)    COPD (chronic obstructive pulmonary disease) (HCC)    HTN. Benign hypertension with CKD (chronic kidney disease) stage III (HCC)    Gout with tophi    Hyperlipidemia with target LDL less than 70    Dry skin dermatitis HANDS    Meningioma, cerebral (HCC)    Paroxysmal atrial fibrillation (HCC)    Influenza vaccination declined    Recurrent UTI    Hypomagnesemia    Kidney stones    Diverticulosis    COVID-19 vaccination declined    Acute kidney injury ([facility]    Acute on chronic combined systolic (congestive) and diastolic (congestive) heart failure (HCC)    Chronic a-fib (HCC)    Chronic venous stasis dermatitis of both lower extremities       Medications listed as ordered at the time of discharge from hospital  [unfilled]     Medications marked \"taking\" at this time  Outpatient Medications Marked as Taking for the 2/22/22 encounter (Office Visit) with Daphney Terrazas MD   Medication Sig Dispense Refill    Nebulizers (COMPRESSOR/NEBULIZER) MISC Nebulizer with compressor. Disposable Med Nebs 2 /month. Reusable Med Nebs 1 per 6 months. Aerosol Mask 1 per month. Replacement Filters 2 per month. All other related supplies as needed per month. Medications being used: Albuterol . 083 unit dose vial. Frequency: every 6 hrs x 4/day . Length of Need: 1 1 each 3    albuterol (PROVENTIL) (2.5 MG/3ML) 0.083% nebulizer solution Take 3 mLs by nebulization every 6 hours as needed for Wheezing or Shortness of Breath 125 each 0    albuterol sulfate HFA (PROAIR HFA) 108 (90 Base) MCG/ACT inhaler Inhale 2 puffs into the lungs every 6 hours as needed for Wheezing or Shortness of Breath (cough) 8 g 3    Misc.  Devices (ADJUST FOLD CANE/YORK HANDLE) MISC Use daily for walking 1 each 0    Handicap Placard MISC by Does not apply route Expires on 12/30/25 1 each 0    apixaban (ELIQUIS) 5 MG TABS tablet Take 1 tablet by mouth 2 times daily 180 tablet 3    allopurinol (ZYLOPRIM) 300 MG tablet Take 1 tablet by mouth daily Per rheumatologist 90 tablet 3    amLODIPine (NORVASC) 10 MG tablet Take 1 tablet by mouth daily FOR GOUT 90 tablet 3    atorvastatin (LIPITOR) 40 MG tablet Take 1 tablet by mouth once daily 90 tablet 3    bumetanide (BUMEX) 1 MG tablet Take 1 tablet by mouth 2 times daily 180 tablet 3    chlorhexidine (HIBICLENS) 4 % external liquid for decontamination AND WASH LEGS EVERY  mL 2    clopidogrel (PLAVIX) 75 MG tablet Take 1 tablet by mouth daily 90 tablet 3    desloratadine (CLARINEX) 5 MG tablet Take 1 tablet by mouth once daily 90 tablet 3    FreeStyle Lancets MISC 1 each by Does not apply route daily Patient needs to contact office before any further refills will be approved 90 each 3    magnesium oxide (MAG-OX) 400 (241.3 Mg) MG TABS tablet Take 1 tablet by mouth twice daily 180 tablet 3    metoprolol tartrate (LOPRESSOR) 50 MG tablet Take 1 tablet by mouth 2 times daily 180 tablet 3    miconazole (MICOTIN) 2 % powder Apply topically 2 times daily UNDER THE SKIN FOLDS LONG TERM 45 g 1    mupirocin (BACTROBAN) 2 % cream Apply 2 times dailY X 10 DAYS ON OPEN BLISTERS ON THE LEGS. 30 g 3    Multiple Vitamins-Minerals (THERAPEUTIC MULTIVITAMIN-MINERALS) tablet Take 1 tablet by mouth daily 90 tablet 3    colchicine (COLCRYS) 0.6 MG tablet Take 2 tablets now, then 1 tablet one hour later. Wait 12 hours then start 1 tablet bid for 5 days. 13 tablet 0    Bismuth Tribromoph-Petrolatum (XEROFORM PETROLATUM PATCH 2\"X2\") PADS external pads Apply 1 each topically daily 30 each 2    vitamin D (ERGOCALCIFEROL) 1.25 MG (38302 UT) CAPS capsule Take 1 capsule by mouth once a week 24 capsule 0    blood glucose test strips (FREESTYLE LITE) strip 1 each by In Vitro route daily As needed.  100 each 3    Blood Pressure KIT 1 kit by Does not apply route three times daily 1 kit 0    blood glucose monitor kit and supplies 1 kit by Other route three times daily Dispense Butterfly Elite CBG Device 1 kit 0        Medications patient taking as of now reconciled against medications ordered at time of hospital discharge: Yes    Review of Systems   Constitutional: Positive for activity change. Negative for appetite change, chills, fever and unexpected weight change. HENT: Positive for congestion. Eyes: Negative for visual disturbance. Respiratory: Positive for cough and shortness of breath. Negative for chest tightness and wheezing. Cardiovascular: Positive for leg swelling. Negative for chest pain and palpitations. Gastrointestinal: Negative for abdominal distention, abdominal pain, blood in stool, constipation and rectal pain. Endocrine: Negative for polyuria. Genitourinary: Positive for dysuria. Negative for difficulty urinating, genital sores, urgency and vaginal pain. Musculoskeletal: Positive for arthralgias, back pain, gait problem, joint swelling and myalgias. Skin: Positive for color change. Negative for rash and wound. Mild erythema   Neurological: Positive for weakness and numbness. Negative for dizziness, speech difficulty, light-headedness and headaches. Psychiatric/Behavioral: Negative for agitation, decreased concentration, dysphoric mood and suicidal ideas. The patient is nervous/anxious. Objective:    /60   Pulse 62   Temp 97.2 °F (36.2 °C)   Ht 4' 11\" (1.499 m)   SpO2 98%   BMI 31.53 kg/m²   Physical Exam  Vitals and nursing note reviewed. Constitutional:       Appearance: Normal appearance. HENT:      Nose: Nose normal.      Mouth/Throat:      Mouth: Mucous membranes are moist.   Eyes:      Pupils: Pupils are equal, round, and reactive to light. Cardiovascular:      Rate and Rhythm: Normal rate and regular rhythm. Heart sounds: Normal heart sounds. Pulmonary:      Effort: No tachypnea, bradypnea or respiratory distress.       Breath sounds: Decreased air movement present. Decreased breath sounds present. No wheezing, rhonchi or rales. Abdominal:      General: Abdomen is protuberant. Palpations: Abdomen is soft. Musculoskeletal:      Cervical back: Normal range of motion. Right lower leg: Tenderness present. No deformity. 3+ Pitting Edema present. Left lower leg: Tenderness present. No deformity. 3+ Pitting Edema present. Lymphadenopathy:      Cervical: No cervical adenopathy. Skin:     General: Skin is dry. Findings: Erythema and rash present. No abrasion. Rash is scaling. Comments: Dryness on bilateral lower extremities with edema mild redness. Neurological:      Mental Status: She is alert and oriented to person, place, and time. Cranial Nerves: Cranial nerves are intact. No cranial nerve deficit. Motor: Weakness present. Gait: Gait abnormal.   Psychiatric:         Attention and Perception: Attention and perception normal.         Mood and Affect: Mood is anxious. Mood is not depressed. Speech: She is communicative. Speech is not rapid and pressured. Behavior: Behavior normal.         Thought Content: Thought content is not paranoid or delusional.         An electronic signature was used to authenticate this note.   --Mary Escobedo MD

## 2022-02-22 NOTE — PROGRESS NOTES
Visit Information    Have you changed or started any medications since your last visit including any over-the-counter medicines, vitamins, or herbal medicines? no   Are you having any side effects from any of your medications? -  no  Have you stopped taking any of your medications? Is so, why? -  no    Have you seen any other physician or provider since your last visit? Yes - Records Obtained  Have you had any other diagnostic tests since your last visit? Yes - Records Obtained  Have you been seen in the emergency room and/or had an admission to a hospital since we last saw you? Yes - Records Obtained  Have you had your routine dental cleaning in the past 6 months? no    Have you activated your [employer] account? If not, what are your barriers?  Yes     Patient Care Team:  Ortega Garcia MD as PCP - General (Family Medicine)  Ortega Garcia MD as PCP - [facility]  Ben Leon MD as Consulting Physician (Rheumatology)  Pearl Serrato MD as Surgeon (Cardiology)  Kathy Lezama MD as Consulting Physician (Nephrology)  Jonathon Peguero MD as Consulting Physician (Neurology)  Kathy Lezama MD as Consulting Physician (Nephrology)  Perlita Barron MD as Consulting Physician (Urology)  Rosa Maria Carcamo MD as Surgeon (Ophthalmology)    Medical History Review  Past Medical, Family, and Social History reviewed and does contribute to the patient presenting condition    Health Maintenance   Topic Date Due    COVID-19 Vaccine (1) Never done    DTaP/Tdap/Td vaccine (1 - Tdap) Never done    Shingles Vaccine (1 of 2) Never done    Pneumococcal 65+ years Vaccine (1 of 1 - PPSV23) Never done    Colorectal Cancer Screen  03/29/2018    Diabetic retinal exam  10/19/2021    Flu vaccine (1) 06/30/2022 (Originally 9/1/2021)    Diabetic microalbuminuria test  03/18/2022    Breast cancer screen  08/28/2022    Diabetic foot exam  09/22/2022    A1C test (Diabetic or Prediabetic) 11/24/2022    Annual Wellness Visit (AWV)  11/25/2022    Lipid screen  12/06/2022    Depression Screen  01/07/2023    Potassium monitoring  02/11/2023    Creatinine monitoring  02/11/2023    DEXA (modify frequency per FRAX score)  Completed    Hepatitis C screen  Completed    Hepatitis A vaccine  Aged Out    Hib vaccine  Aged Out    Meningococcal (ACWY) vaccine  Aged Out

## 2022-02-24 DIAGNOSIS — N30.01 ACUTE CYSTITIS WITH HEMATURIA: Primary | ICD-10-CM

## 2022-02-24 LAB
CULTURE: NORMAL
SPECIMEN DESCRIPTION: NORMAL

## 2022-02-24 RX ORDER — CIPROFLOXACIN 500 MG/1
500 TABLET, FILM COATED ORAL 2 TIMES DAILY
Qty: 6 TABLET | Refills: 0 | Status: SHIPPED | OUTPATIENT
Start: 2022-02-24 | End: 2022-02-27

## 2022-02-25 ENCOUNTER — TELEPHONE (OUTPATIENT)
Dept: FAMILY MEDICINE CLINIC | Age: 72
End: 2022-02-25

## 2022-02-25 NOTE — TELEPHONE ENCOUNTER
Faxed over to [address] [place] [address], the scripts for nebulizer. Did call patient to make her away it was faxed and they will call her when ready to be picked up. Also did make her aware of handicap placard, will be available to be picked up at . Placed in brown folder. Also did scan in [facility] forms/script along with confirmation. Placed up front next to brown folder where confirmation pile is at for records.

## 2022-02-28 ENCOUNTER — TELEPHONE (OUTPATIENT)
Dept: FAMILY MEDICINE CLINIC | Age: 72
End: 2022-02-28

## 2022-02-28 DIAGNOSIS — N30.01 ACUTE CYSTITIS WITH HEMATURIA: Primary | ICD-10-CM

## 2022-02-28 RX ORDER — CIPROFLOXACIN 500 MG/1
500 TABLET, FILM COATED ORAL 2 TIMES DAILY
Qty: 6 TABLET | Refills: 0 | Status: ON HOLD | OUTPATIENT
Start: 2022-02-28 | End: 2022-03-16 | Stop reason: HOSPADM

## 2022-02-28 NOTE — TELEPHONE ENCOUNTER
Called pt and lvm about message below, and to call office back for clarifictaion or to schedule an appt.

## 2022-02-28 NOTE — TELEPHONE ENCOUNTER
Can you please advise, script was not called in yet and patient wanted to verify, once med called In please make her aware.

## 2022-02-28 NOTE — TELEPHONE ENCOUNTER
----- Message from Yovana Reed sent at 2/25/2022  4:45 PM EST -----  Subject: Message to Provider    QUESTIONS  Information for Provider? Pt's daughter Geovany Youssef calling for pt that sees Dr. Monica Lundy. Pt was advised via phone call today that she has an infection &   was being prescribed antibiotics. Nothing has been received by [facility] on   [place]. Please advise pt asap if she needs to be on this med.   ---------------------------------------------------------------------------  --------------  [address]  What is the best way for the office to contact you? OK to leave message on   voicemail  Preferred Call Back Phone Number?  [phone]  ---------------------------------------------------------------------------  --------------  SCRIPT ANSWERS  undefined

## 2022-02-28 NOTE — TELEPHONE ENCOUNTER
I'm not in the office today, I don't see who she saw her today and who advised her that she has an infection,there is no encounter  She did see Dr. Axel Vargas 1 week ago  Might need another appointment, or clarify with whom she saw today.     Future Appointments   Date Time Provider Angela Reyez   4/15/2022  3:45 PM Kentrell Egan MD [facility]   4/28/2022  2:00 PM Johann Barakat MD [facility]   5/4/2022  3:30 PM Johann Barakat MD [facility]   11/25/2022  3:30 PM Johann Barakat MD fp sc [facility]

## 2022-02-28 NOTE — TELEPHONE ENCOUNTER
1. Acute cystitis with hematuria    - ciprofloxacin (CIPRO) 500 MG tablet; Take 1 tablet by mouth 2 times daily for 3 days  Dispense: 6 tablet; Refill: 0    I sent prescription to mail in pharmacy before, now new one done to [facility].

## 2022-03-01 ENCOUNTER — APPOINTMENT (OUTPATIENT)
Dept: GENERAL RADIOLOGY | Age: 72
DRG: 377 | End: 2022-03-01
Payer: OTHER GOVERNMENT

## 2022-03-01 ENCOUNTER — HOSPITAL ENCOUNTER (INPATIENT)
Age: 72
LOS: 15 days | Discharge: HOME | DRG: 377 | End: 2022-03-17
Attending: EMERGENCY MEDICINE | Admitting: INTERNAL MEDICINE
Payer: OTHER GOVERNMENT

## 2022-03-01 ENCOUNTER — TELEPHONE (OUTPATIENT)
Dept: OTHER | Facility: CLINIC | Age: 72
End: 2022-03-01

## 2022-03-01 DIAGNOSIS — I50.32 CHRONIC DIASTOLIC CHF (CONGESTIVE HEART FAILURE) (HCC): ICD-10-CM

## 2022-03-01 DIAGNOSIS — E55.9 VITAMIN D DEFICIENCY: Primary | ICD-10-CM

## 2022-03-01 DIAGNOSIS — N17.9 ACUTE KIDNEY INJURY (HCC): ICD-10-CM

## 2022-03-01 DIAGNOSIS — D64.9 SYMPTOMATIC ANEMIA: Primary | ICD-10-CM

## 2022-03-01 DIAGNOSIS — J44.9 CHRONIC OBSTRUCTIVE PULMONARY DISEASE, UNSPECIFIED COPD TYPE (HCC): ICD-10-CM

## 2022-03-01 LAB
ABSOLUTE EOS #: 0 K/UL (ref 0–0.4)
ABSOLUTE LYMPH #: 0.71 K/UL (ref 1–4.8)
ABSOLUTE MONO #: 0.51 K/UL (ref 0.1–1.3)
ABSOLUTE RETIC #: 0.11 M/UL (ref 0.02–0.1)
ANION GAP SERPL CALCULATED.3IONS-SCNC: 16 MMOL/L (ref 9–17)
BASOPHILS # BLD: 0 % (ref 0–2)
BASOPHILS ABSOLUTE: 0 K/UL (ref 0–0.2)
BUN BLDV-MCNC: 110 MG/DL (ref 8–23)
C-REACTIVE PROTEIN: <3 MG/L (ref 0–5)
CALCIUM SERPL-MCNC: 8.5 MG/DL (ref 8.6–10.4)
CHLORIDE BLD-SCNC: 100 MMOL/L (ref 98–107)
CO2: 19 MMOL/L (ref 20–31)
CREAT SERPL-MCNC: 2.11 MG/DL (ref 0.5–0.9)
EOSINOPHILS RELATIVE PERCENT: 0 % (ref 0–4)
GFR AFRICAN AMERICAN: 28 ML/MIN
GFR NON-AFRICAN AMERICAN: 23 ML/MIN
GFR SERPL CREATININE-BSD FRML MDRD: ABNORMAL ML/MIN/{1.73_M2}
GLUCOSE BLD-MCNC: 178 MG/DL (ref 70–99)
HCT VFR BLD CALC: 14.1 % (ref 36–46)
HEMOGLOBIN: 4.4 G/DL (ref 12–16)
INFLUENZA A: NOT DETECTED
INFLUENZA B: NOT DETECTED
LYMPHOCYTES # BLD: 7 % (ref 24–44)
MAGNESIUM: 1.8 MG/DL (ref 1.6–2.6)
MCH RBC QN AUTO: 28.2 PG (ref 26–34)
MCHC RBC AUTO-ENTMCNC: 31.2 G/DL (ref 31–37)
MCV RBC AUTO: 90.6 FL (ref 80–100)
MONOCYTES # BLD: 5 % (ref 1–7)
MORPHOLOGY: ABNORMAL
PDW BLD-RTO: 17.4 % (ref 11.5–14.9)
PLATELET # BLD: 201 K/UL (ref 150–450)
PMV BLD AUTO: 8.9 FL (ref 6–12)
POTASSIUM SERPL-SCNC: 3.9 MMOL/L (ref 3.7–5.3)
PRO-BNP: 5281 PG/ML
RBC # BLD: 1.56 M/UL (ref 4–5.2)
RETIC %: 7 % (ref 0.5–2)
SARS-COV-2 RNA, RT PCR: NOT DETECTED
SEG NEUTROPHILS: 88 % (ref 36–66)
SEGMENTED NEUTROPHILS ABSOLUTE COUNT: 8.98 K/UL (ref 1.3–9.1)
SODIUM BLD-SCNC: 135 MMOL/L (ref 135–144)
SOURCE: NORMAL
SPECIMEN DESCRIPTION: NORMAL
TROPONIN, HIGH SENSITIVITY: 55 NG/L (ref 0–14)
WBC # BLD: 10.2 K/UL (ref 3.5–11)

## 2022-03-01 PROCEDURE — 36415 COLL VENOUS BLD VENIPUNCTURE: CPT

## 2022-03-01 PROCEDURE — 83735 ASSAY OF MAGNESIUM: CPT

## 2022-03-01 PROCEDURE — 82728 ASSAY OF FERRITIN: CPT

## 2022-03-01 PROCEDURE — 83540 ASSAY OF IRON: CPT

## 2022-03-01 PROCEDURE — G0328 FECAL BLOOD SCRN IMMUNOASSAY: HCPCS

## 2022-03-01 PROCEDURE — 96360 HYDRATION IV INFUSION INIT: CPT

## 2022-03-01 PROCEDURE — 85045 AUTOMATED RETICULOCYTE COUNT: CPT

## 2022-03-01 PROCEDURE — P9016 RBC LEUKOCYTES REDUCED: HCPCS

## 2022-03-01 PROCEDURE — 85025 COMPLETE CBC W/AUTO DIFF WBC: CPT

## 2022-03-01 PROCEDURE — 99283 EMERGENCY DEPT VISIT LOW MDM: CPT

## 2022-03-01 PROCEDURE — 87636 SARSCOV2 & INF A&B AMP PRB: CPT

## 2022-03-01 PROCEDURE — 83010 ASSAY OF HAPTOGLOBIN QUANT: CPT

## 2022-03-01 PROCEDURE — 93005 ELECTROCARDIOGRAM TRACING: CPT | Performed by: EMERGENCY MEDICINE

## 2022-03-01 PROCEDURE — 86901 BLOOD TYPING SEROLOGIC RH(D): CPT

## 2022-03-01 PROCEDURE — 86140 C-REACTIVE PROTEIN: CPT

## 2022-03-01 PROCEDURE — 86850 RBC ANTIBODY SCREEN: CPT

## 2022-03-01 PROCEDURE — 83880 ASSAY OF NATRIURETIC PEPTIDE: CPT

## 2022-03-01 PROCEDURE — 71045 X-RAY EXAM CHEST 1 VIEW: CPT

## 2022-03-01 PROCEDURE — 84484 ASSAY OF TROPONIN QUANT: CPT

## 2022-03-01 PROCEDURE — 36430 TRANSFUSION BLD/BLD COMPNT: CPT

## 2022-03-01 PROCEDURE — 83550 IRON BINDING TEST: CPT

## 2022-03-01 PROCEDURE — 80048 BASIC METABOLIC PNL TOTAL CA: CPT

## 2022-03-01 PROCEDURE — 86900 BLOOD TYPING SEROLOGIC ABO: CPT

## 2022-03-01 PROCEDURE — 86920 COMPATIBILITY TEST SPIN: CPT

## 2022-03-01 PROCEDURE — 6370000000 HC RX 637 (ALT 250 FOR IP): Performed by: EMERGENCY MEDICINE

## 2022-03-01 PROCEDURE — 2580000003 HC RX 258: Performed by: EMERGENCY MEDICINE

## 2022-03-01 RX ORDER — SODIUM CHLORIDE 9 MG/ML
INJECTION, SOLUTION INTRAVENOUS PRN
Status: COMPLETED | OUTPATIENT
Start: 2022-03-01 | End: 2022-03-02

## 2022-03-01 RX ORDER — ERGOCALCIFEROL 1.25 MG/1
50000 CAPSULE ORAL WEEKLY
Qty: 12 CAPSULE | Refills: 0 | Status: SHIPPED | OUTPATIENT
Start: 2022-03-01 | End: 2022-04-27

## 2022-03-01 RX ORDER — ACETAMINOPHEN 325 MG/1
650 TABLET ORAL ONCE
Status: COMPLETED | OUTPATIENT
Start: 2022-03-01 | End: 2022-03-01

## 2022-03-01 RX ADMIN — ACETAMINOPHEN 650 MG: 325 TABLET, FILM COATED ORAL at 23:50

## 2022-03-01 RX ADMIN — SODIUM CHLORIDE: 9 INJECTION, SOLUTION INTRAVENOUS at 23:43

## 2022-03-01 ASSESSMENT — ENCOUNTER SYMPTOMS
TROUBLE SWALLOWING: 0
CONSTIPATION: 0
NAUSEA: 0
COUGH: 0
SHORTNESS OF BREATH: 0
ABDOMINAL PAIN: 0
COLOR CHANGE: 0
DIARRHEA: 0
SORE THROAT: 0
BLOOD IN STOOL: 0
VOMITING: 0
BACK PAIN: 0

## 2022-03-01 ASSESSMENT — PAIN SCALES - GENERAL: PAINLEVEL_OUTOF10: 6

## 2022-03-01 NOTE — TELEPHONE ENCOUNTER
Please let the patient know to  prescription from the pharmacy. Orders Placed This Encounter   Medications    vitamin D (ERGOCALCIFEROL) 1.25 MG (60555 UT) CAPS capsule     Sig: Take 1 capsule by mouth once a week     Dispense:  12 capsule     Refill:  0         [facility]  - [address] [place] [address],[address], [address] - P [phone] [facility] [phone]  [address] [place] [address] [address]  [facility]  Phone: [phone] Fax: [phone]      No orders of the defined types were placed in this encounter. Future Appointments   Date Time Provider Angela Reyez   4/15/2022  3:45 PM Sharyle Sheen, MD [facility]   4/28/2022  2:00 PM Iris Molina MD [facility]   5/4/2022  3:30 [facility]   11/25/2022  3:30 PM Iris Molina MD [facility]       Thank you!

## 2022-03-01 NOTE — TELEPHONE ENCOUNTER
Please Approve or Refuse.   Send to Pharmacy per Pt's Request:      PATIENT IS REQUESTING VITAMIN D2 50,000 UNITS    Next Visit Date:  4/28/2022   Last Visit Date: 2/22/2022    Hemoglobin A1C (%)   Date Value   11/24/2021 5.8   06/29/2021 5.7   03/12/2021 5.9             ( goal A1C is < 7)   BP Readings from Last 3 Encounters:   02/22/22 110/60   02/11/22 (!) 105/58   01/25/22 130/66          (goal 120/80)  BUN   Date Value Ref Range Status   02/22/2022 87 (H) 8 - 23 mg/dL Final     CREATININE   Date Value Ref Range Status   02/22/2022 1.69 (H) 0.50 - 0.90 mg/dL Final     Potassium   Date Value Ref Range Status   02/22/2022 4.1 3.7 - 5.3 mmol/L Final

## 2022-03-02 ENCOUNTER — APPOINTMENT (OUTPATIENT)
Dept: ULTRASOUND IMAGING | Age: 72
DRG: 377 | End: 2022-03-02
Payer: OTHER GOVERNMENT

## 2022-03-02 PROBLEM — K92.2 ACUTE GI BLEEDING: Status: ACTIVE | Noted: 2022-03-02

## 2022-03-02 LAB
ANION GAP SERPL CALCULATED.3IONS-SCNC: 14 MMOL/L (ref 9–17)
BUN BLDV-MCNC: 104 MG/DL (ref 8–23)
CALCIUM SERPL-MCNC: 8.4 MG/DL (ref 8.6–10.4)
CHLORIDE BLD-SCNC: 104 MMOL/L (ref 98–107)
CO2: 19 MMOL/L (ref 20–31)
CREAT SERPL-MCNC: 1.86 MG/DL (ref 0.5–0.9)
DATE, STOOL #1: ABNORMAL
EKG ATRIAL RATE: 80 BPM
EKG P AXIS: 58 DEGREES
EKG P-R INTERVAL: 176 MS
EKG Q-T INTERVAL: 392 MS
EKG QRS DURATION: 104 MS
EKG QTC CALCULATION (BAZETT): 452 MS
EKG R AXIS: 8 DEGREES
EKG T AXIS: -68 DEGREES
EKG VENTRICULAR RATE: 80 BPM
FERRITIN: 13 UG/L (ref 13–150)
FOLATE: >20 NG/ML
GFR AFRICAN AMERICAN: 32 ML/MIN
GFR NON-AFRICAN AMERICAN: 27 ML/MIN
GFR SERPL CREATININE-BSD FRML MDRD: ABNORMAL ML/MIN/{1.73_M2}
GLUCOSE BLD-MCNC: 139 MG/DL (ref 70–99)
GLUCOSE BLD-MCNC: 173 MG/DL (ref 65–105)
HAPTOGLOBIN: 107 MG/DL (ref 30–200)
HCT VFR BLD CALC: 19.4 % (ref 36–46)
HCT VFR BLD CALC: 21.9 % (ref 36–46)
HEMOCCULT SP1 STL QL: POSITIVE
HEMOGLOBIN: 6.2 G/DL (ref 12–16)
HEMOGLOBIN: 7.4 G/DL (ref 12–16)
IRON SATURATION: 5 % (ref 20–55)
IRON: 20 UG/DL (ref 37–145)
MCH RBC QN AUTO: 29 PG (ref 26–34)
MCHC RBC AUTO-ENTMCNC: 32.2 G/DL (ref 31–37)
MCV RBC AUTO: 90.1 FL (ref 80–100)
PDW BLD-RTO: 16.5 % (ref 11.5–14.9)
PLATELET # BLD: 195 K/UL (ref 150–450)
PMV BLD AUTO: 8.9 FL (ref 6–12)
POTASSIUM SERPL-SCNC: 3.8 MMOL/L (ref 3.7–5.3)
RBC # BLD: 2.15 M/UL (ref 4–5.2)
SODIUM BLD-SCNC: 137 MMOL/L (ref 135–144)
SURGICAL PATHOLOGY REPORT: NORMAL
TIME, STOOL #1: 2353
TOTAL IRON BINDING CAPACITY: 419 UG/DL (ref 250–450)
UNSATURATED IRON BINDING CAPACITY: 399 UG/DL (ref 112–347)
VITAMIN B-12: 729 PG/ML (ref 232–1245)
WBC # BLD: 10.9 K/UL (ref 3.5–11)

## 2022-03-02 PROCEDURE — 6360000002 HC RX W HCPCS: Performed by: NURSE PRACTITIONER

## 2022-03-02 PROCEDURE — 85018 HEMOGLOBIN: CPT

## 2022-03-02 PROCEDURE — P9016 RBC LEUKOCYTES REDUCED: HCPCS

## 2022-03-02 PROCEDURE — 6370000000 HC RX 637 (ALT 250 FOR IP): Performed by: NURSE PRACTITIONER

## 2022-03-02 PROCEDURE — 76705 ECHO EXAM OF ABDOMEN: CPT

## 2022-03-02 PROCEDURE — 82746 ASSAY OF FOLIC ACID SERUM: CPT

## 2022-03-02 PROCEDURE — 94760 N-INVAS EAR/PLS OXIMETRY 1: CPT

## 2022-03-02 PROCEDURE — 99223 1ST HOSP IP/OBS HIGH 75: CPT | Performed by: INTERNAL MEDICINE

## 2022-03-02 PROCEDURE — 2580000003 HC RX 258: Performed by: INTERNAL MEDICINE

## 2022-03-02 PROCEDURE — APPNB30 APP NON BILLABLE TIME 0-30 MINS: Performed by: NURSE PRACTITIONER

## 2022-03-02 PROCEDURE — 94664 DEMO&/EVAL PT USE INHALER: CPT

## 2022-03-02 PROCEDURE — 2500000003 HC RX 250 WO HCPCS: Performed by: NURSE PRACTITIONER

## 2022-03-02 PROCEDURE — 80048 BASIC METABOLIC PNL TOTAL CA: CPT

## 2022-03-02 PROCEDURE — 85027 COMPLETE CBC AUTOMATED: CPT

## 2022-03-02 PROCEDURE — 2060000000 HC ICU INTERMEDIATE R&B

## 2022-03-02 PROCEDURE — C9113 INJ PANTOPRAZOLE SODIUM, VIA: HCPCS | Performed by: NURSE PRACTITIONER

## 2022-03-02 PROCEDURE — 82607 VITAMIN B-12: CPT

## 2022-03-02 PROCEDURE — 2580000003 HC RX 258: Performed by: NURSE PRACTITIONER

## 2022-03-02 PROCEDURE — 36415 COLL VENOUS BLD VENIPUNCTURE: CPT

## 2022-03-02 PROCEDURE — 82947 ASSAY GLUCOSE BLOOD QUANT: CPT

## 2022-03-02 PROCEDURE — 93010 ELECTROCARDIOGRAM REPORT: CPT | Performed by: INTERNAL MEDICINE

## 2022-03-02 PROCEDURE — 94640 AIRWAY INHALATION TREATMENT: CPT

## 2022-03-02 PROCEDURE — 85014 HEMATOCRIT: CPT

## 2022-03-02 PROCEDURE — 6360000002 HC RX W HCPCS: Performed by: INTERNAL MEDICINE

## 2022-03-02 RX ORDER — CHLORHEXIDINE GLUCONATE 4 G/100ML
SOLUTION TOPICAL DAILY
Status: DISCONTINUED | OUTPATIENT
Start: 2022-03-02 | End: 2022-03-09

## 2022-03-02 RX ORDER — COLCHICINE 0.6 MG/1
0.6 TABLET ORAL DAILY
Status: DISCONTINUED | OUTPATIENT
Start: 2022-03-02 | End: 2022-03-02

## 2022-03-02 RX ORDER — BUMETANIDE 1 MG/1
1 TABLET ORAL 2 TIMES DAILY
Status: DISCONTINUED | OUTPATIENT
Start: 2022-03-02 | End: 2022-03-05

## 2022-03-02 RX ORDER — PANTOPRAZOLE SODIUM 40 MG/10ML
40 INJECTION, POWDER, LYOPHILIZED, FOR SOLUTION INTRAVENOUS DAILY
Status: DISCONTINUED | OUTPATIENT
Start: 2022-03-02 | End: 2022-03-17 | Stop reason: HOSPADM

## 2022-03-02 RX ORDER — SODIUM CHLORIDE 0.9 % (FLUSH) 0.9 %
5-40 SYRINGE (ML) INJECTION EVERY 12 HOURS SCHEDULED
Status: DISCONTINUED | OUTPATIENT
Start: 2022-03-02 | End: 2022-03-17 | Stop reason: HOSPADM

## 2022-03-02 RX ORDER — SODIUM CHLORIDE 9 MG/ML
INJECTION, SOLUTION INTRAVENOUS PRN
Status: DISCONTINUED | OUTPATIENT
Start: 2022-03-02 | End: 2022-03-17 | Stop reason: HOSPADM

## 2022-03-02 RX ORDER — M-VIT,TX,IRON,MINS/CALC/FOLIC 27MG-0.4MG
1 TABLET ORAL DAILY
Status: DISCONTINUED | OUTPATIENT
Start: 2022-03-02 | End: 2022-03-17 | Stop reason: HOSPADM

## 2022-03-02 RX ORDER — CETIRIZINE HYDROCHLORIDE 5 MG/1
10 TABLET ORAL DAILY
Status: DISCONTINUED | OUTPATIENT
Start: 2022-03-02 | End: 2022-03-02

## 2022-03-02 RX ORDER — ATORVASTATIN CALCIUM 40 MG/1
40 TABLET, FILM COATED ORAL DAILY
Status: DISCONTINUED | OUTPATIENT
Start: 2022-03-02 | End: 2022-03-17 | Stop reason: HOSPADM

## 2022-03-02 RX ORDER — SODIUM CHLORIDE 0.9 % (FLUSH) 0.9 %
10 SYRINGE (ML) INJECTION PRN
Status: DISCONTINUED | OUTPATIENT
Start: 2022-03-02 | End: 2022-03-17 | Stop reason: HOSPADM

## 2022-03-02 RX ORDER — ONDANSETRON 4 MG/1
4 TABLET, ORALLY DISINTEGRATING ORAL EVERY 8 HOURS PRN
Status: DISCONTINUED | OUTPATIENT
Start: 2022-03-02 | End: 2022-03-17 | Stop reason: HOSPADM

## 2022-03-02 RX ORDER — ONDANSETRON 2 MG/ML
4 INJECTION INTRAMUSCULAR; INTRAVENOUS EVERY 6 HOURS PRN
Status: DISCONTINUED | OUTPATIENT
Start: 2022-03-02 | End: 2022-03-17 | Stop reason: HOSPADM

## 2022-03-02 RX ORDER — SODIUM CHLORIDE 0.9 % (FLUSH) 0.9 %
10 SYRINGE (ML) INJECTION DAILY
Status: DISCONTINUED | OUTPATIENT
Start: 2022-03-02 | End: 2022-03-17 | Stop reason: HOSPADM

## 2022-03-02 RX ORDER — ACETAMINOPHEN 650 MG/1
650 SUPPOSITORY RECTAL EVERY 6 HOURS PRN
Status: DISCONTINUED | OUTPATIENT
Start: 2022-03-02 | End: 2022-03-17 | Stop reason: HOSPADM

## 2022-03-02 RX ORDER — SODIUM CHLORIDE 9 MG/ML
INJECTION, SOLUTION INTRAVENOUS CONTINUOUS
Status: DISCONTINUED | OUTPATIENT
Start: 2022-03-02 | End: 2022-03-04

## 2022-03-02 RX ORDER — ALBUTEROL SULFATE 2.5 MG/3ML
2.5 SOLUTION RESPIRATORY (INHALATION) EVERY 6 HOURS PRN
Status: DISCONTINUED | OUTPATIENT
Start: 2022-03-02 | End: 2022-03-07

## 2022-03-02 RX ORDER — ACETAMINOPHEN 325 MG/1
650 TABLET ORAL EVERY 6 HOURS PRN
Status: DISCONTINUED | OUTPATIENT
Start: 2022-03-02 | End: 2022-03-17 | Stop reason: HOSPADM

## 2022-03-02 RX ORDER — CIPROFLOXACIN 500 MG/1
500 TABLET, FILM COATED ORAL 2 TIMES DAILY
Status: DISCONTINUED | OUTPATIENT
Start: 2022-03-02 | End: 2022-03-02 | Stop reason: DRUGHIGH

## 2022-03-02 RX ORDER — METOPROLOL TARTRATE 50 MG/1
50 TABLET, FILM COATED ORAL 2 TIMES DAILY
Status: DISCONTINUED | OUTPATIENT
Start: 2022-03-02 | End: 2022-03-07

## 2022-03-02 RX ORDER — CETIRIZINE HYDROCHLORIDE 5 MG/1
5 TABLET ORAL DAILY
Status: DISCONTINUED | OUTPATIENT
Start: 2022-03-03 | End: 2022-03-09

## 2022-03-02 RX ORDER — AMLODIPINE BESYLATE 10 MG/1
10 TABLET ORAL DAILY
Status: DISCONTINUED | OUTPATIENT
Start: 2022-03-02 | End: 2022-03-07

## 2022-03-02 RX ORDER — ALLOPURINOL 300 MG/1
300 TABLET ORAL DAILY
Status: DISCONTINUED | OUTPATIENT
Start: 2022-03-02 | End: 2022-03-17 | Stop reason: HOSPADM

## 2022-03-02 RX ORDER — SODIUM CHLORIDE 9 MG/ML
25 INJECTION, SOLUTION INTRAVENOUS PRN
Status: DISCONTINUED | OUTPATIENT
Start: 2022-03-02 | End: 2022-03-17 | Stop reason: HOSPADM

## 2022-03-02 RX ORDER — CIPROFLOXACIN 500 MG/1
500 TABLET, FILM COATED ORAL
Status: COMPLETED | OUTPATIENT
Start: 2022-03-02 | End: 2022-03-03

## 2022-03-02 RX ORDER — CLOPIDOGREL BISULFATE 75 MG/1
75 TABLET ORAL DAILY
Status: DISCONTINUED | OUTPATIENT
Start: 2022-03-02 | End: 2022-03-09

## 2022-03-02 RX ADMIN — ANTI-FUNGAL POWDER MICONAZOLE NITRATE TALC FREE: 1.42 POWDER TOPICAL at 20:33

## 2022-03-02 RX ADMIN — SODIUM CHLORIDE: 9 INJECTION, SOLUTION INTRAVENOUS at 02:22

## 2022-03-02 RX ADMIN — ACETAMINOPHEN 650 MG: 325 TABLET, FILM COATED ORAL at 17:00

## 2022-03-02 RX ADMIN — ACETAMINOPHEN 650 MG: 325 TABLET, FILM COATED ORAL at 09:12

## 2022-03-02 RX ADMIN — PANTOPRAZOLE SODIUM 40 MG: 40 INJECTION, POWDER, FOR SOLUTION INTRAVENOUS at 02:25

## 2022-03-02 RX ADMIN — CETIRIZINE HYDROCHLORIDE 10 MG: 1 SOLUTION ORAL at 09:49

## 2022-03-02 RX ADMIN — IRON SUCROSE 200 MG: 20 INJECTION, SOLUTION INTRAVENOUS at 15:45

## 2022-03-02 RX ADMIN — SODIUM CHLORIDE, PRESERVATIVE FREE 10 ML: 5 INJECTION INTRAVENOUS at 09:18

## 2022-03-02 RX ADMIN — CIPROFLOXACIN 500 MG: 500 TABLET, FILM COATED ORAL at 07:34

## 2022-03-02 RX ADMIN — ALBUTEROL SULFATE 2.5 MG: 2.5 SOLUTION RESPIRATORY (INHALATION) at 21:45

## 2022-03-02 RX ADMIN — ACETAMINOPHEN 650 MG: 325 TABLET, FILM COATED ORAL at 23:39

## 2022-03-02 RX ADMIN — COLCHICINE 0.6 MG: 0.6 TABLET, FILM COATED ORAL at 09:11

## 2022-03-02 RX ADMIN — MULTIPLE VITAMINS W/ MINERALS TAB 1 TABLET: TAB at 09:11

## 2022-03-02 RX ADMIN — MAGNESIUM OXIDE TAB 400 MG (241.3 MG ELEMENTAL MG) 400 MG: 400 (241.3 MG) TAB at 20:32

## 2022-03-02 RX ADMIN — METOPROLOL 50 MG: 50 TABLET ORAL at 20:32

## 2022-03-02 RX ADMIN — ATORVASTATIN CALCIUM 40 MG: 40 TABLET, FILM COATED ORAL at 09:21

## 2022-03-02 RX ADMIN — MAGNESIUM OXIDE TAB 400 MG (241.3 MG ELEMENTAL MG) 400 MG: 400 (241.3 MG) TAB at 09:11

## 2022-03-02 RX ADMIN — METOPROLOL 50 MG: 50 TABLET ORAL at 09:11

## 2022-03-02 RX ADMIN — AMLODIPINE BESYLATE 10 MG: 10 TABLET ORAL at 09:20

## 2022-03-02 RX ADMIN — ANTI-FUNGAL POWDER MICONAZOLE NITRATE TALC FREE: 1.42 POWDER TOPICAL at 09:17

## 2022-03-02 RX ADMIN — ALLOPURINOL 300 MG: 300 TABLET ORAL at 09:11

## 2022-03-02 ASSESSMENT — PAIN SCALES - GENERAL
PAINLEVEL_OUTOF10: 3
PAINLEVEL_OUTOF10: 0
PAINLEVEL_OUTOF10: 3
PAINLEVEL_OUTOF10: 8
PAINLEVEL_OUTOF10: 3
PAINLEVEL_OUTOF10: 0

## 2022-03-02 ASSESSMENT — ENCOUNTER SYMPTOMS
DIARRHEA: 0
NAUSEA: 0
COUGH: 0
ABDOMINAL PAIN: 0
BACK PAIN: 0
SORE THROAT: 0
CONSTIPATION: 0
VOMITING: 0
WHEEZING: 0
SHORTNESS OF BREATH: 1

## 2022-03-02 NOTE — TELEPHONE ENCOUNTER
Admission orders were placed before writer contacted Dr. Kiara Samuel to inform of 30 day readmission risk.

## 2022-03-02 NOTE — ED PROVIDER NOTES
[address]  EMERGENCY DEPARTMENT ENCOUNTER    Pt Name: Haleigh Escalera  MRN: [id number]  YOB: 1950  Date of evaluation:3/1/22  PCP: Maya Johnson MD    [address] [place] [address]       Chief Complaint   Patient presents with    Fatigue       HISTORY OF PRESENT ILLNESS    Haleigh Escalera is a 66 y.o. female who presents with a chief complaint of generalized fatigue. Patient has been feeling fatigued and short of breath and generally weak for the past 2 days or so. Denies any chest pain. She is on blood thinners but has not noticed any bleeding in her stool and no vomiting. Does not wear oxygen at home. Symptoms are acute. Symptomatic per nothing make symptoms better or worse. Patient has no other complaints at this time. REVIEW OF SYSTEMS       Review of Systems   Constitutional: Positive for fatigue. Negative for chills and fever. HENT: Negative for congestion, ear pain, sore throat and trouble swallowing. Eyes: Negative for visual disturbance. Respiratory: Negative for cough and shortness of breath. Cardiovascular: Negative for chest pain, palpitations and leg swelling. Gastrointestinal: Negative for abdominal pain, blood in stool, constipation, diarrhea, nausea and vomiting. Genitourinary: Negative for dysuria and flank pain. Musculoskeletal: Negative for arthralgias, back pain, myalgias and neck pain. Skin: Positive for pallor. Negative for color change, rash and wound. Neurological: Positive for weakness. Negative for dizziness, light-headedness, numbness and headaches. Psychiatric/Behavioral: Negative for confusion. All other systems reviewed and are negative. Negative in 10 essential Systems except as mentioned above and in the HPI.         PAST MEDICAL HISTORY     Past Medical History:   Diagnosis Date    Acute CVA (cerebrovascular accident) ([facility] [address] 7/25/2020    Altered mental status 5/26/2021    Arthritis     Brain mass     Cellulitis and abscess     topically daily    BLOOD GLUCOSE MONITOR KIT AND SUPPLIES    1 kit by Other route three times daily Dispense Butterfly Elite CBG Device    BLOOD GLUCOSE TEST STRIPS (FREESTYLE LITE) STRIP    1 each by In Vitro route daily As needed. BLOOD PRESSURE KIT    1 kit by Does not apply route three times daily    BUMETANIDE (BUMEX) 1 MG TABLET    Take 1 tablet by mouth 2 times daily    CHLORHEXIDINE (HIBICLENS) 4 % EXTERNAL LIQUID    for decontamination AND WASH LEGS EVERY DAY    CIPROFLOXACIN (CIPRO) 500 MG TABLET    Take 1 tablet by mouth 2 times daily for 3 days    CLOPIDOGREL (PLAVIX) 75 MG TABLET    Take 1 tablet by mouth daily    COLCHICINE (COLCRYS) 0.6 MG TABLET    Take 2 tablets now, then 1 tablet one hour later. Wait 12 hours then start 1 tablet bid for 5 days. DESLORATADINE (CLARINEX) 5 MG TABLET    Take 1 tablet by mouth once daily    FREESTYLE LANCETS MISC    1 each by Does not apply route daily Patient needs to contact office before any further refills will be approved    [address]    by Does not apply route Expires on 12/30/25    MAGNESIUM OXIDE (MAG-OX) 400 (241.3 MG) MG TABS TABLET    Take 1 tablet by mouth twice daily    METOPROLOL TARTRATE (LOPRESSOR) 50 MG TABLET    Take 1 tablet by mouth 2 times daily    MICONAZOLE (MICOTIN) 2 % POWDER    Apply topically 2 times daily UNDER THE SKIN FOLDS LONG TERM    MISC. DEVICES (ADJUST FOLD CANE/YORK HANDLE) MISC    Use daily for walking    MULTIPLE VITAMINS-MINERALS (THERAPEUTIC MULTIVITAMIN-MINERALS) TABLET    Take 1 tablet by mouth daily    MUPIROCIN (BACTROBAN) 2 % CREAM    Apply 2 times dailY X 10 DAYS ON OPEN BLISTERS ON THE LEGS. NEBULIZERS (COMPRESSOR/NEBULIZER) MISC    Nebulizer with compressor. Disposable Med Nebs 2 /month. Reusable Med Nebs 1 per 6 months. Aerosol Mask 1 per month. Replacement Filters 2 per month. All other related supplies as needed per month. Medications being used: Albuterol . 083 unit dose vial. Frequency: every 6 hrs x 4/day . Length of Need: 1    VITAMIN D (ERGOCALCIFEROL) 1.25 MG (10374 UT) CAPS CAPSULE    Take 1 capsule by mouth once a week       ALLERGIES     is allergic to latex, aleve [naproxen sodium], pioglitazone, claritin [loratadine], keflex [cephalexin], and lisinopril. FAMILY HISTORY     She indicated that the status of her mother is unknown. She indicated that the status of her father is unknown. She indicated that the status of her sister is unknown. She indicated that the status of her maternal grandmother is unknown.     family history includes Diabetes in her maternal grandmother, mother, and sister; Heart Disease in her father and mother; High Blood Pressure in her sister. SOCIAL HISTORY      reports that she quit smoking about 22 years ago. She has a 2.50 pack-year smoking history. She has never used smokeless tobacco. She reports previous alcohol use. She reports that she does not use drugs. PHYSICAL EXAM     INITIAL VITALS:  height is 4' 11\" (1.499 m) and weight is 134 lb (60.8 kg). Her oral temperature is 97.1 °F (36.2 °C). Her blood pressure is 111/46 (abnormal) and her pulse is 79. Her respiration is 17 and oxygen saturation is 96%. Physical Exam  Vitals and nursing note reviewed. Constitutional:       General: She is not in acute distress. HENT:      Head: Normocephalic and atraumatic. Eyes:      Conjunctiva/sclera: Conjunctivae normal.      Pupils: Pupils are equal, round, and reactive to light. Cardiovascular:      Rate and Rhythm: Normal rate and regular rhythm. Heart sounds: Normal heart sounds. No murmur heard. Pulmonary:      Effort: Pulmonary effort is normal. No respiratory distress. Breath sounds: Normal breath sounds. Abdominal:      General: Bowel sounds are normal. There is no distension. Palpations: Abdomen is soft. Tenderness: There is no abdominal tenderness. Musculoskeletal:         General: No tenderness. Cervical back: Neck supple. Lymphadenopathy:      Cervical: No cervical adenopathy. Skin:     General: Skin is warm and dry. Coloration: Skin is pale. Findings: No rash. Neurological:      General: No focal deficit present. Mental Status: She is alert and oriented to person, place, and time. Psychiatric:         Judgment: Judgment normal.           DIFFERENTIAL DIAGNOSIS/MDM:   76-year-old female presents with generalized fatigue and generalized weakness for the past 2 days. She is afebrile, nontoxic, normal vital signs. No acute distress. On exam she does look slightly pale, no gross neurologic deficits on exam.    We will get broad cardiac, metabolic work-up. DIAGNOSTIC RESULTS     EKG: All EKG's are interpreted by the Emergency Department Physician who either signs or Co-signs this chart in the absence of a cardiologist.    EKG Interpretation    Interpreted by me    Rhythm: normal sinus   Rate: normal  Axis: normal  Ectopy: none  Conduction: normal  ST Segments: Nonspecific, depression in 1, 2, aVL  T Waves: no acute change  Q Waves: none    Clinical Impression: Nonspecific EKG    RADIOLOGY:   I directly visualized the following  images and reviewed the radiologist interpretations:  XR CHEST PORTABLE   Final Result   1. Cardiomegaly, mild vascular congestion, improved since the prior study   2.  Small bilateral pleural effusions                 ED BEDSIDE ULTRASOUND:      LABS:  Labs Reviewed   TROPONIN - Abnormal; Notable for the following components:       Result Value    Troponin, High Sensitivity 55 (*)     All other components within normal limits   CBC WITH AUTO DIFFERENTIAL - Abnormal; Notable for the following components:    RBC 1.56 (*)     Hemoglobin 4.4 (*)     Hematocrit 14.1 (*)     RDW 17.4 (*)     Seg Neutrophils 88 (*)     Lymphocytes 7 (*)     Absolute Lymph # 0.71 (*)     All other components within normal limits   BASIC METABOLIC PANEL - Abnormal; Notable for the following components: Glucose 178 (*)      (*)     CREATININE 2.11 (*)     Calcium 8.5 (*)     CO2 19 (*)     GFR Non- 23 (*)     GFR  28 (*)     All other components within normal limits   BRAIN NATRIURETIC PEPTIDE - Abnormal; Notable for the following components:    Pro-BNP 5,281 (*)     All other components within normal limits   RETICULOCYTES - Abnormal; Notable for the following components:    Retic % 7.0 (*)     Absolute Retic # 0.111 (*)     All other components within normal limits   COVID-19 & INFLUENZA COMBO   MAGNESIUM   C-REACTIVE PROTEIN   TROPONIN   PERIPHERAL BLOOD SMEAR, PATH REVIEW   OCCULT BLOOD SCREEN   TYPE AND SCREEN   TYPE AND SCREEN   PREPARE RBC (CROSSMATCH)         EMERGENCY DEPARTMENT COURSE:   Vitals:    Vitals:    03/01/22 2140 03/01/22 2217 03/01/22 2232 03/01/22 2247   BP: (!) 106/38 (!) 116/42 (!) 108/43 (!) 111/46   Pulse: 86 80 81 79   Resp: 20 21 19 17   Temp: 97.1 °F (36.2 °C)      TempSrc: Oral      SpO2: 96% 98% 97% 96%   Weight: 134 lb (60.8 kg)      Height: 4' 11\" (1.499 m)        Patient found to have a hemoglobin of 4.4. Will transfuse 1 unit of blood. I did a digital rectal exam with female RN. There is no obvious gross blood on exam however will stand stool sample for fecal occult testing. Spoke with Kathrin Leyden nurse practitioner who accepted patient for admission for symptomatic anemia under staff medicine service. CRITICALCARE:      CONSULTS:  None      PROCEDURES:      FINAL IMPRESSION      1. Symptomatic anemia            DISPOSITION/PLAN   DISPOSITION Decision To Admit 03/01/2022 10:26:15 PM          PATIENT REFERRED TO:  No follow-up provider specified. DISCHARGE MEDICATIONS:  New Prescriptions    No medications on file       The care is provided during an unprecedented national emergency due to the novel coronavirus, COVID-19.     (Please note that portions ofthis note were completed with a voice recognition program.  Efforts were made to

## 2022-03-02 NOTE — PLAN OF CARE
PRE CONSULT ROUNDING NOTE  HPI  66year old female with pmh of iron deficiency anemia, cva and afib on eliquis and plavix dm ckd copd chf who presented to the ED for fatigue. Our service is consulted for anemia. Pt was found to have hgb of 4.4 improved to 6.2 with blood transfusion. Reports iron deficiency anemia with pregnancy. Has had poor appetite. She has not had abdominal pain melena hematochezia or hematemesis. She denies prior gi bleeding. Anemia profile pending. No recent abdominal imaging, creat 1.86 bun 104 alk phos 230 ast 32 inr 1.3. no c/o weight loss dysphagia abdominal pain change in the bowel habits rash.      Endoscopy none  Family reports sister with pancreatic cancer, another sister with colon cancer no liver or stomach cancer no uc/crohns  Social quit smoking no etoh or illicit drugs   BP (!) 143/51   Pulse 94   Temp 97.5 °F (36.4 °C) (Oral)   Resp 18   Ht 4' 11\" (1.499 m)   Wt 135 lb (61.2 kg)   SpO2 94%   BMI 27.27 kg/m²     ROS as above meds labs imaging and past medical records were reviewed    Exam  General Appearance: alert and oriented to person, place and time, well-developed and well-nourished, in no acute distress  Skin: warm and dry, no rash or erythema pt is pale  Head: normocephalic and atraumatic  Eyes: pupils equal, round, and reactive to light, extraocular eye movements intact, conjunctivae normal  ENT: hearing dereased bilaterally  Neck: neck supple and non tender without mass, no thyromegaly or thyroid nodules, no cervical lymphadenopathy   Pulmonary/Chest: clear to auscultation bilaterally- no wheezes, rales or rhonchi, normal air movement, no respiratory distress  Cardiovascular: normal rate, regular rhythm, normal S1 and S2, no murmurs, rubs, clicks or gallops, distal pulses intact, no carotid bruits  Abdomen: soft, obese non tender, non-distended, normal bowel sounds, no masses or organomegaly no ascites  Extremities: no cyanosis, clubbing or edema  Musculoskeletal: normal range of motion, no joint swelling, deformity or tenderness  Neurologic: no cranial nerve deficit and muscle strength normal    Assessment  Anemia  Elevated lft's  Family hx of colon and pancreatic cancer    Plan  Will d/w md  Anemia profile pending  Clear diet today  Will need egd and colonoscopy, will need AC held  Liver us ordered  Formal gi consult to follow  . Lalita Dowd, APRN - CNP

## 2022-03-02 NOTE — PROGRESS NOTES
Ressie Payment from [address] called and advised to keep patient NPO starting now. They will scan patient at 1330. Jannet Lynn RN notified.  Electronically signed by Radha Dumont RN on 3/2/2022 at 9:27 AM

## 2022-03-02 NOTE — H&P
[address] [facility]     HISTORY AND PHYSICAL EXAMINATION            Date:   3/2/2022  Patientname:  Huber Ojeda  Date of admission:  3/1/2022  9:47 PM  MRN:   [id number]  Account:  [de-identified]  YOB: 1950  PCP:    Marta George MD  Room:   03/03  Code Status:    Prior    CHIEF COMPLAINT     Chief Complaint   Patient presents with    Fatigue       HISTORY OF PRESENT ILLNESS  (Character, Onset, Location, Duration,  Exacerbating/RelievingFactors, Radiation,   Associated Symptoms, Severity )      The patient is a 66 y.o.  female, with a history of CVA, chronic combined CHF, asthma, COPD, HTN, impaired hearing, BRIGIDO, CKD stage III, and DM type II, who presents with fatigue. According to patient, she began feeling weak and fatigued on 2/28. Reports that she was started on antibiotic for mild UTI that morning and started feeling better overnight. However, today she noted shortness of breath with activity. Patient currently takes Eliquis twice daily for paroxysmal A. fib. Denies additional associated symptoms. Denies fever, chills, chest pain, cough, abdominal pain, nausea, vomiting, diarrhea, and urinary symptoms. Denies melena, hematochezia, and hematuria. Denies previous history of GI bleed. There are no aggravating or alleviating factors. Symptoms are reported as intermittent and moderate.     PAST MEDICAL HISTORY   Patient  has a past medical history of Acute CVA (cerebrovascular accident) ([address] Altered mental status, Arthritis, Brain mass, Cellulitis and abscess, Cellulitis and abscess of unspecified site, Cellulitis of both lower extremities, Cellulitis of left lower extremity, Cellulitis of lower extremity, CHF (congestive heart failure) ([address] Chronic kidney disease, unspecified, Coronary atherosclerosis of native coronary artery, Essential hypertension, Essential hypertension, malignant, Hallucinations, Hard of hearing, Head contusion, Hypokalemia, Iron deficiency anemia, unspecified, Meningioma ([address] Myocardial infarction ([address] Other and unspecified hyperlipidemia, Pure hypercholesterolemia, Toxic metabolic encephalopathy, Type II or unspecified type diabetes mellitus without mention of complication, not stated as uncontrolled, Unspecified asthma(493.90), Unspecified venous (peripheral) insufficiency, Unspecified vitamin D deficiency, and UTI (urinary tract infection). PAST SURGICAL HISTORY    Patient  has a past surgical history that includes Tonsillectomy and adenoidectomy and Coronary artery bypass graft. FAMILY HISTORY    Patient family history includes Diabetes in her maternal grandmother, mother, and sister; Heart Disease in her father and mother; High Blood Pressure in her sister. SOCIAL HISTORY    Patient  reports that she quit smoking about 22 years ago. She has a 2.50 pack-year smoking history. She has never used smokeless tobacco. She reports previous alcohol use. She reports that she does not use drugs. HOME MEDICATIONS        Prior to Admission medications    Medication Sig Start Date End Date Taking?  Authorizing Provider   vitamin D (ERGOCALCIFEROL) 1.25 MG (54194 UT) CAPS capsule Take 1 capsule by mouth once a week  Patient taking differently: Take 50,000 Units by mouth once a week Sundays 3/1/22  Yes Rut Frost MD   ciprofloxacin (CIPRO) 500 MG tablet Take 1 tablet by mouth 2 times daily for 3 days 2/28/22 3/3/22 Yes Jostin Lara MD   albuterol sulfate HFA (PROAIR HFA) 108 (90 Base) MCG/ACT inhaler Inhale 2 puffs into the lungs every 6 hours as needed for Wheezing or Shortness of Breath (cough) 2/22/22  Yes Jostin Lara MD   apixaban (ELIQUIS) 5 MG TABS tablet Take 1 tablet by mouth 2 times daily 2/15/22  Yes Rut Frost MD   allopurinol (ZYLOPRIM) 300 MG tablet Take 1 tablet by mouth daily Per rheumatologist 2/15/22  Yes Rut Frost MD   amLODIPine (NORVASC) 10 MG tablet Take 1 tablet by mouth daily FOR GOUT 2/15/22  Yes Guerita Tellez MD   atorvastatin (LIPITOR) 40 MG tablet Take 1 tablet by mouth once daily 2/15/22  Yes Guerita Tellez MD   bumetanide (BUMEX) 1 MG tablet Take 1 tablet by mouth 2 times daily 2/15/22  Yes Guerita Tellez MD   chlorhexidine (HIBICLENS) 4 % external liquid for decontamination AND [facility] LEGS EVERY DAY 2/15/22  Yes Guerita Tellez MD   clopidogrel (PLAVIX) 75 MG tablet Take 1 tablet by mouth daily 2/15/22  Yes Guerita Tellez MD   desloratadine (CLARINEX) 5 MG tablet Take 1 tablet by mouth once daily 2/15/22  Yes Guerita Tellez MD   magnesium oxide (MAG-OX) 400 (241.3 Mg) MG TABS tablet Take 1 tablet by mouth twice daily 2/15/22  Yes Guerita Tellez MD   metoprolol tartrate (LOPRESSOR) 50 MG tablet Take 1 tablet by mouth 2 times daily 2/15/22  Yes Guerita Tellez MD   miconazole (MICOTIN) 2 % powder Apply topically 2 times daily UNDER THE SKIN FOLDS LONG TERM 2/15/22  Yes Guerita Tellez MD   mupirocin (BACTROBAN) 2 % cream Apply 2 times dailY X 10 DAYS ON OPEN BLISTERS ON THE LEGS. 2/15/22 3/17/22 Yes Guerita Tellez MD   Multiple Vitamins-Minerals (THERAPEUTIC MULTIVITAMIN-MINERALS) tablet Take 1 tablet by mouth daily 2/15/22  Yes Guerita Tellez MD   colchicine (COLCRYS) 0.6 MG tablet Take 2 tablets now, then 1 tablet one hour later. Wait 12 hours then start 1 tablet bid for 5 days. Patient taking differently: Take 0.6 mg by mouth daily Take 2 tablets now, then 1 tablet one hour later. Wait 12 hours then start 1 tablet bid for 5 days. 2/15/22  Yes Guerita Tellez MD   Nebulizers (COMPRESSOR/NEBULIZER) MISC Nebulizer with compressor. Disposable Med Nebs 2 /month. Reusable Med Nebs 1 per 6 months. Aerosol Mask 1 per month. Replacement Filters 2 per month. All other related supplies as needed per month. Medications being used: Albuterol . 083 unit dose vial. Frequency: every 6 hrs x 4/day . Length of Need: 1 2/22/22   José Rodriguez Zeb Nelson MD   albuterol (PROVENTIL) (2.5 MG/3ML) 0.083% nebulizer solution Take 3 mLs by nebulization every 6 hours as needed for Wheezing or Shortness of Breath 2/22/22   Katy Solano MD   Misc. Devices (ADJUST FOLD CANE/YORK HANDLE) MISC Use daily for walking 2/22/22   Katy Solano MD   Handicap Placard MISC by Does not apply route Expires on 12/30/25 2/22/22   Katy Solano MD   FreeStyle Lancets MISC 1 each by Does not apply route daily Patient needs to contact office before any further refills will be approved 2/15/22   Marta George MD   Bismuth Tribromoph-Petrolatum (XEROFORM PETROLATUM PATCH 2\"X2\") PADS external pads Apply 1 each topically daily 12/10/21   MAL Moreau - CNP   blood glucose test strips (FREESTYLE LITE) strip 1 each by In Vitro route daily As needed. 3/19/21   Marta George MD   Blood Pressure KIT 1 kit by Does not apply route three times daily 12/19/19   Felicity Lucero MD   blood glucose monitor kit and supplies 1 kit by Other route three times daily Dispense Butterfly Elite CBG Device 8/20/19   Mack Yang MD       ALLERGIES      Latex, Aleve [naproxen sodium], Pioglitazone, Claritin [loratadine], Keflex [cephalexin], and Lisinopril    REVIEW OF SYSTEMS     Review of Systems   Constitutional: Positive for fatigue. Negative for chills, diaphoresis and fever. HENT: Negative for congestion and sore throat. Respiratory: Positive for shortness of breath. Negative for cough and wheezing. Cardiovascular: Negative for chest pain, palpitations and leg swelling. Gastrointestinal: Negative for abdominal pain, constipation, diarrhea, nausea and vomiting. Genitourinary: Negative for dysuria, frequency and urgency. Musculoskeletal: Negative for back pain and myalgias. Skin: Negative for rash. Neurological: Positive for weakness (generalized). Negative for dizziness and headaches. Psychiatric/Behavioral: The patient is not nervous/anxious. PHYSICAL EXAM      BP (!) 131/49   Pulse 86   Temp 97.1 °F (36.2 °C) (Oral)   Resp 16   Ht 4' 11\" (1.499 m)   Wt 134 lb (60.8 kg)   SpO2 94%   BMI 27.06 kg/m²  Body mass index is 27.06 kg/m². Physical Exam  Constitutional:       General: She is not in acute distress. Appearance: She is well-developed. She is not diaphoretic. HENT:      Head: Normocephalic and atraumatic. Eyes:      Pupils: Pupils are equal, round, and reactive to light. Comments: Conjunctiva pale   Neck:      Trachea: No tracheal deviation. Cardiovascular:      Rate and Rhythm: Normal rate and regular rhythm. Heart sounds: Normal heart sounds. No murmur heard. No friction rub. No gallop. Pulmonary:      Effort: Pulmonary effort is normal. No respiratory distress. Breath sounds: Normal breath sounds. No wheezing or rales. Chest:      Chest wall: No tenderness. Abdominal:      General: Bowel sounds are normal. There is no distension. Palpations: Abdomen is soft. Tenderness: There is no abdominal tenderness. There is no guarding. Musculoskeletal:         General: No tenderness. Normal range of motion. Cervical back: Normal range of motion and neck supple. Lymphadenopathy:      Cervical: No cervical adenopathy. Skin:     General: Skin is warm and dry. Coloration: Skin is pale. Findings: No erythema or rash. Neurological:      Mental Status: She is alert and oriented to person, place, and time. Motor: No seizure activity. Coordination: Coordination normal.   Psychiatric:         Behavior: Behavior normal.         Thought Content:  Thought content normal.       DIAGNOSTICS      EKG:   EKG 12 Lead [[id number]]    Collected: 03/01/22 2217    Updated: 03/01/22 2218     Ventricular Rate 80 BPM    Atrial Rate 80 BPM    P-R Interval 176 ms    QRS Duration 104 ms    Q-T Interval 392 ms    QTc Calculation (Bazett) 452 ms    P Axis 58 degrees    R Axis 8 degrees    T Axis -68 degrees   Narrative:     Normal sinus rhythm   Left ventricular hypertrophy with repolarization abnormality   Possible Inferior infarct , age undetermined   Abnormal ECG   When compared with ECG of 06-FEB-2022 10:25,   Borderline criteria for Inferior infarct are now Present   ST now depressed in Lateral leads     Labs:  CBC:   Recent Labs     03/01/22 2207   WBC 10.2   HGB 4.4*        BMP:    Recent Labs     03/01/22 2207      K 3.9      CO2 19*   *   CREATININE 2.11*   GLUCOSE 178*     S. Calcium:  Recent Labs     03/01/22 2207   CALCIUM 8.5*     S. Ionized Calcium:No results for input(s): IONCA in the last 72 hours. S. Magnesium:  Recent Labs     03/01/22 2207   MG 1.8     S. Phosphorus:No results for input(s): PHOS in the last 72 hours. S. Glucose:No results for input(s): POCGLU in the last 72 hours. Glycosylated hemoglobin A1C:   Lab Results   Component Value Date    LABA1C 5.8 11/24/2021     Hepatic: No results for input(s): AST, ALT, ALB, ALKPHOS in the last 72 hours. Invalid input(s):  PROT,  BILITOT,  BILIDIR  CARDIAC ENZY:   Recent Labs     03/01/22 2207   TROPHS 55*     INR: No results for input(s): INR in the last 72 hours. BNP:   Recent Labs     03/01/22 2207   PROBNP 5,281*      ABGs: No results for input(s): PH, PCO2, PO2, HCO3, O2SAT in the last 72 hours. Lipids: No results for input(s): CHOL, TRIG, HDL, LDL, LDLCALC in the last 72 hours. Pancreatic functions:No results for input(s): LIPASE, AMYLASE in the last 72 hours. Marlyce Ing: No results for input(s): LACTA in the last 72 hours. Thyroid functions:   Lab Results   Component Value Date    TSH 2.60 12/06/2021      U/A:No results for input(s): NITRITE, COLORU, WBCUA, RBCUA, MUCUS, BACTERIA, CLARITYU, SPECGRAV, LEUKOCYTESUR, BLOODU, GLUCOSEU, AMORPHOUS in the last 72 hours.     Invalid input(s): Bridgett Boxer  Recent Labs     03/01/22 2234   COVID19 Not Detected     Imaging/Diagonstics:     ECHO Complete 2D W Doppler W Color    Result Date: 2/7/2022  [facility] Transthoracic Echocardiography Report (TTE)  Patient Name [id number]       Date of Study                 02/07/2022               Tom Miranda   Date of      1950  Gender                        Female  Birth   Age          66 year(s)  Race                             Room Number  2090        Height:                       58.66 inch, 149 cm   Corporate ID [id number]    Weight:                       132 pounds, 60 kg  #   Patient Acct [de-identified]   BSA:           1.54 m^2       BMI:      27.03  #                                                                kg/m^2   MR #         [id number]      Michaela Caceres   Accession #  [id number]  Interpreting Physician        Aleah Kendall   Fellow                   Referring Nurse Practitioner   Interpreting             Referring Physician           Jann Duong  Fellow                                                 Carmel Hanson  Type of Study   TTE procedure:2D Echocardiogram, M-Mode, Doppler, Color Doppler. Procedure Date Date: 02/07/2022 Start: 11:37 AM Study Location: [address] [place] [address] Technical Quality: Fair visualization Indications:Congestive heart failure. History / Tech. Comments: CABG DM CKD COPD HTN AF Patient Status: Inpatient Height: 58.66 inches Weight: 132.3 pounds BSA: 1.54 m^2 BMI: 27.03 kg/m^2 Rhythm: Within normal limits Allergies   - Lisinopril. - Keflex. - *Unlisted:(Aleve,Claratin, Pioglitazone). CONCLUSIONS Summary Normal left ventricle size and function with an estimated EF > 55%. No segmental wall motion abnormalities seen. Moderate left ventricular hypertrophy. Normal right ventricular size and function. Left atrial dilatation. Aortic valve is trileaflet. Mild aortic stenosis. Mild-moderate aortic insufficiency. Normal aortic root dimension. Mitral annular calcification is seen. Mild mitral regurgitation.  Mild tricuspid regurgitation. Normal right ventricular systolic pressure. No significant pericardial effusion is seen. Pleural effusion present. Signature ----------------------------------------------------------------------------  Electronically signed by Saira Price(Sonographer) on 2022 12:16  PM ---------------------------------------------------------------------------- ----------------------------------------------------------------------------  Electronically signed by Dane Morales(Interpreting physician) on  2022 12:19 PM ---------------------------------------------------------------------------- FINDINGS Left Atrium Left atrial dilatation. Left Ventricle Normal left ventricle size and function with an estimated EF > 55%. No segmental wall motion abnormalities seen. Moderate left ventricular hypertrophy. Right Atrium Right atrial dilatation. Right Ventricle Normal right ventricular size and function. Mitral Valve Mitral annular calcification is seen. Mild mitral regurgitation. Aortic Valve Aortic valve is trileaflet. Mild aortic stenosis. Mild-moderate aortic insufficiency. Tricuspid Valve Normal tricuspid valve structure and function. Mild tricuspid regurgitation. Normal right ventricular systolic pressure. Pulmonic Valve The pulmonic valve is normal in structure. Mild pulmonic insufficiency. Pericardial Effusion No significant pericardial effusion is seen. Pleural Effusion Pleural effusion present. Miscellaneous Normal aortic root dimension. E/e' average 16.5 IVC normal diameter & inspiratory collapse indicating normal RA filling pressure .  M-mode / 2D Measurements & Calculations:   LVIDd:4 cm(3.7 - 5.6 cm)         Diastolic JCAVFD:65 ml  IQEQC:3.83 cm(2.2 - 4.0 cm)      Systolic CEHXPL:47.3 ml  [place]:1.31 cm(0.6 - 1.1 cm)       Aortic Root:2.9 cm(2.0 - 3.7 cm)  LVPWd:1.53 cm(0.6 - 1.1 cm)      LA Dimension: 5.4 cm(1.9 - 4.0 cm)  Fractional Shortenin %       LA volume/Index: 56.5 ml /37m^2 Calculated LVEF (%): 73.75 %     LVOT:2.2 cm   Mitral:                                 Aortic   Valve Area (P1/2-Time): 2.68 cm^2       Peak Velocity: 2.09 m/s  Peak E-Wave: 1.24 m/s                   Mean Velocity: 1.34 m/s  Peak A-Wave: 0.92 m/s                   Peak Gradient: 17.47 mmHg  E/A Ratio: 1.35                         Mean Gradient: 9 mmHg  Peak Gradient: 6.15 mmHg                AI P1/2t: 325 msec  Mean Gradient: 3 mmHg  Deceleration Time: 169 msec             Area (continuity): 1.84 cm^2  P1/2t: 82 msec                          AV VTI: 51.2 cm   Area (continuity): 2.32 cm^2  Mean Velocity: 0.85 m/s   Tricuspid:   Peak TR Velocity: 3.03 m/s  Peak TR Gradient: 36.7236 mmHg  Septal Wall E' velocity:0.07 m/s Lateral Wall E' velocity:0.09 m/s    XR CHEST PORTABLE    Result Date: 3/1/2022  EXAMINATION: ONE XRAY VIEW OF THE CHEST 3/1/2022 7:43 pm COMPARISON: February 6, 2022 HISTORY: ORDERING SYSTEM PROVIDED HISTORY: fatigue TECHNOLOGIST PROVIDED HISTORY: fatigue Reason for Exam: Fatigue, shortness of breath Additional signs and symptoms: Fatigue, shortness of breath Relevant Medical/Surgical History: Fatigue, shortness of breath FINDINGS: Marginal inspiration is present. Cardiomegaly is present. Vascular markings are engorged. Small pleural effusions are noted. No pneumothorax is present. Patient is status post prior sternotomy. No acute osseous abnormality is present. 1. Cardiomegaly, mild vascular congestion, improved since the prior study 2. Small bilateral pleural effusions     XR CHEST PORTABLE    Result Date: 2/6/2022  EXAMINATION: ONE XRAY VIEW OF THE CHEST 2/6/2022 10:03 am COMPARISON: 05/25/2021 HISTORY: Reason for Exam: sob FINDINGS: Mild cardiomegaly. Generalized interstitial prominence suggesting vascular congestion. Mild bilateral pleural effusions with associated atelectasis. No pneumothorax. Stable postsurgical changes of CABG. Findings suggesting likely CHF as described. ASSESSMENT  and  PLAN     Principal Problem:    Acute GI bleeding  Active Problems:    Stage 3 chronic kidney disease (HCC)    Paroxysmal atrial fibrillation (HCC)  Resolved Problems:    * No resolved hospital problems. *    Plan:    Acute GI Bleeding  -Denies obvious signs of blood loss  -rectal guaiac faintly positive in ED  -hemoglobin 4.4 on arrival  -Protonix IV daily  -Hold ASA, Eliquis, and VTE d/t bleeding  -Consult GI  -NPO except ice chips and sips with meds    Symptomatic Anemia  -Hemoglobin 4.4 in ED  --1 unit PRBC received   -hemoccult faintly positive from [facility] in ED  -Retic Ct - 7.0%  --add ferritin, TIBC, folate/b12 and haptoglbin on pretransfusion blood    CKD  -Patient with history of CKD Stage  -Creatinine: 2.11;   ---Baseline: near 1.7  ---Hold Bumex for now    Recently admitted for CHF  -Pro-BNP - 5,281  -CXR -cardiomegaly with mild vascular congestion  --Improved since prior study  --Small bilateral pleural effusions  --Denies chest pain    Consultations:     None      MAL Quarles - CNP   3/2/2022  1:24 AM    [address], [address] [place] [address]. Phone [phone]   Attending Physician Statement  I have discussed the care of [address] [place] [address] Drive and I have examined the patient myselft and taken ros and hpi , including pertinent history and exam findings,  with the resident. I have reviewed the key elements of all parts of the encounter with the resident. I agree with the assessment, plan and orders as documented by the resident.   53-year-old lady with history of atrial fibrillation history of coronary disease stent placed 20 years ago on Eliquis and Plavix 75 baseline hemoglobin was 10 2 weeks ago admitted with gradual increase of fatigue dyspnea exertion sleepy most part of the day no fever no vomiting blood no blood in the stool no dark stools no diarrhea hemoglobin ER was 4.4 suspected GI bleed Eliquis and Plavix held n.p.o. for now upper and lower endoscopies GI consulted  IV Venofer 200 mg daily  1 unit of PRBC given hemoglobin improved to 6 will need another unit of PRBC  Ckd,nephro consult,pt was due to see dr Lg Bhandari for ckd    Electronically signed by Adrianne Ko MD

## 2022-03-02 NOTE — CARE COORDINATION
CASE MANAGEMENT NOTE:    Admission Date:  3/1/2022 Jean Jacobs is a 66 y.o.  female    Admitted for : Acute GI bleeding [K92.2]  Symptomatic anemia [D64.9]    Met with:  Patient and Family    PCP:  Dr. Deann Cisneros:  Medicare      Is patient alert and oriented at time of discussion:  Yes    Current Residence/ Living Arrangements:  independently at home             Current Services PTA:  No    Does patient go to outpatient dialysis: No  If yes, location and chair time:     Is patient agreeable to VNS: No    Freedom of choice provided:  NA    List of [address] provided: NA    VNS chosen:  NA    DME:  straight cane and walker    Home Oxygen: No    Nebulizer: No    CPAP/BIPAP: No    Supplier: N/A    Potential Assistance Needed: No    SNF needed: No    Freedom of choice and list provided: No    Pharmacy:  [facility]       Does Patient want to use MEDS to BEDS? No    Is patient currently receiving oral anticoagulation therapy? Yes    Is the Patient an [facility] with Readmission Risk Score greater than 14%? No  If yes, pt needs a follow up appointment made within 7 days. Family Members/Caregivers that pt would like involved in their care:    Yes    If yes, list name here:  [address]    Transportation Provider:  Family             Discharge Plan:  3/2/22 Medicare Readmitt Tool done Pt is from home in a two story home with her son her dtr checks on her daily also DME cane and walker VNS Denies does not want anyone in her home Plan is to discharge to home with no needs will continue to follow Eliquis PTA .//tv                 Electronically signed by:  Morenita Hunt RN on 3/2/2022 at 9:31 AM

## 2022-03-02 NOTE — PROGRESS NOTES
Comprehensive Nutrition Assessment    Type and Reason for Visit:  Positive Nutrition Screen (Weight loss and poor intake)    Nutrition Recommendations/Plan: Continue Clear liquid diet, provide Ensure Clear each meal in apple flavor. Nutrition Assessment:  Pt to ED with complaints of fatigue, shortness of breathe and poor intake for approximately 2 days PTA. Pt currently NPO for liver US then will be on Clear liquid diet for colonoscopy & EGD tomorrow. H/H on admission was 4.4/14.1, given 1 unit PRBC's now up to 6.2/19.4. Daughter unsure if pt has lost any weight but has felt frustrated how to eat between diabetes and told to be on low sodium diet for her kidney problems. Malnutrition Assessment:  Malnutrition Status:  No malnutrition    Context:  Acute Illness     Findings of the 6 clinical characteristics of malnutrition:  Energy Intake:  Mild decrease in energy intake (Comment)  Weight Loss:  No significant weight loss     Body Fat Loss:  No significant body fat loss     Muscle Mass Loss:  No significant muscle mass loss    Fluid Accumulation:  No significant fluid accumulation     Strength:  Not Performed    Estimated Daily Nutrient Needs:  Energy (kcal):  8802-4923 kcals based on 25-27 kcals/kg using adm wt 61 kg; Weight Used for Energy Requirements:  Admission     Protein (g):  69-77 gm protein based on 1.6-1.8 gm/kg using IBW.; Weight Used for Protein Requirements:  Ideal          Nutrition Related Findings:  Edema: +1 BLE's. Adm H/H-4.4/14.1- given 1 unit PRBC's up to 6.2/19.4. BUN/Cr- 110/2.11, Pro-BNP- 5,281. Hx: Iron deficiency anemia, CVA, CHF, CKD, DM. Wounds:  None       Current Nutrition Therapies:    ADULT DIET;  Clear Liquid  ADULT ORAL NUTRITION SUPPLEMENT; Breakfast, Lunch, Dinner; Clear Liquid Oral Supplement    Anthropometric Measures:  · Height: 4' 11\" (149.9 cm)  · Current Body Weight: 135 lb (61.2 kg)   · Admission Body Weight: 135 lb (61.2 kg)     · Ideal Body Weight: 95 lbs; % Ideal Body Weight 142.1 %   · BMI: 27.3  · BMI Categories: Overweight (BMI 25.0-29. 9)       Nutrition Diagnosis:   · Inadequate oral intake related to altered GI function as evidenced by lab values      Nutrition Interventions:   Food and/or Nutrient Delivery:  Continue Current Diet,Start Oral Nutrition Supplement  Nutrition Education/Counseling:  No recommendation at this time   Coordination of Nutrition Care:  Continue to monitor while inpatient    Goals:  Meet estimated nutrient needs       Nutrition Monitoring and Evaluation:   Behavioral-Environmental Outcomes:  None Identified   Food/Nutrient Intake Outcomes:  Food and Nutrient Intake,Supplement Intake,Diet Advancement/Tolerance  Physical Signs/Symptoms Outcomes:  Biochemical Data,GI Status,Fluid Status or Edema,Nutrition Focused Physical Findings,Skin,Weight     Discharge Planning: Too soon to determine     Some areas of assessment may be incomplete due to COVID-19 precautions. Bryson Mcnally R.D., L.DANIELLA.   Clinical Dietitian  Office: [phone]

## 2022-03-02 NOTE — CONSULTS
INITIAL CONSULTATION     HISTORY OF PRESENT ILLNESS: Garry Montano is a 66 y.o. female admitted to the hospital on 3/1/2022 for fatigue. She was found to have anemia. She has iron deficiency. History of stroke and atrial fibrillation. She is on Eliquis and Plavix. She has chronic kidney disease. Hemoglobin was found to be 4.4. She received blood transfusion. Hemoglobin increased to 6.2. She reports history of iron deficiency anemia in the past.  She reports poor appetite. No blood in the stools or melena. She reports no dysphagia. No change in bowel habits. She reports no prior EGD or colonoscopy. She reports sister had colon cancer. She is ex-smoker. She reports no alcohol or drugs.      PAST MEDICAL HISTORY:     Past Medical History:   Diagnosis Date    Acute CVA (cerebrovascular accident) ([facility] [address] 7/25/2020    Altered mental status 5/26/2021    Arthritis     Brain mass     Cellulitis and abscess     Cellulitis and abscess of unspecified site     Cellulitis of both lower extremities 5/26/2021    Cellulitis of left lower extremity 7/26/2020    Cellulitis of lower extremity 10/4/2020    CHF (congestive heart failure) (HCC)     Chronic kidney disease, unspecified     Coronary atherosclerosis of native coronary artery     Essential hypertension 7/25/2020    Essential hypertension, malignant     Hallucinations 2/18/2021    Hard of hearing     Head contusion 9/9/2020    Hypokalemia 9/9/2020    Iron deficiency anemia, unspecified     Meningioma ([facility] [address] 2/25/2021    Myocardial infarction ([facility] [address]     Other and unspecified hyperlipidemia     Pure hypercholesterolemia     Toxic metabolic encephalopathy 2/94/4638    Type II or unspecified type diabetes mellitus without mention of complication, not stated as uncontrolled     Unspecified asthma(493.90)     Unspecified venous (peripheral) insufficiency     Unspecified vitamin D deficiency     UTI (urinary tract infection) 2/18/2021 Past Surgical History:   Procedure Laterality Date    CORONARY ARTERY BYPASS GRAFT      x 3, Dr. Haley Menon:       Current Facility-Administered Medications:     sodium chloride flush 0.9 % injection 5-40 mL, 5-40 mL, IntraVENous, 2 times per day, Earmon Led, APRN - CNP, 10 mL at 03/02/22 0918    sodium chloride flush 0.9 % injection 10 mL, 10 mL, IntraVENous, PRN, Earmon Led, APRN - CNP    0.9 % sodium chloride infusion, 25 mL, IntraVENous, PRN, Earmon Led, APRN - CNP    ondansetron (ZOFRAN-ODT) disintegrating tablet 4 mg, 4 mg, Oral, Q8H PRN **OR** ondansetron (ZOFRAN) injection 4 mg, 4 mg, IntraVENous, Q6H PRN, Earmon Led, APRN - CNP    magnesium hydroxide (MILK OF MAGNESIA) 400 MG/5ML suspension 30 mL, 30 mL, Oral, Daily PRN, Earmon Led, APRN - CNP    acetaminophen (TYLENOL) tablet 650 mg, 650 mg, Oral, Q6H PRN, 650 mg at 03/02/22 0912 **OR** acetaminophen (TYLENOL) suppository 650 mg, 650 mg, Rectal, Q6H PRN, Earmon Led, APRN - CNP    0.9 % sodium chloride infusion, , IntraVENous, Continuous, Earmon Led, APRN - CNP, Last Rate: 75 mL/hr at 03/02/22 0222, New Bag at 03/02/22 0222    albuterol (PROVENTIL) nebulizer solution 2.5 mg, 2.5 mg, Nebulization, Q6H PRN, Earmon Led, APRN - CNP    allopurinol (ZYLOPRIM) tablet 300 mg, 300 mg, Oral, Daily, Earmon Led, APRN - CNP, 300 mg at 03/02/22 0911    amLODIPine (NORVASC) tablet 10 mg, 10 mg, Oral, Daily, Earmon Led, APRN - CNP, 10 mg at 03/02/22 0920    atorvastatin (LIPITOR) tablet 40 mg, 40 mg, Oral, Daily, Earmon Led, APRN - CNP, 40 mg at 03/02/22 9339    [Held by provider] bumetanide (BUMEX) tablet 1 mg, 1 mg, Oral, BID, Earmon Led, APRN - CNP    chlorhexidine (HIBICLENS) 4 % liquid, , Topical, Daily, MAL Merida - CNP    [Held by provider] clopidogrel (PLAVIX) tablet 75 mg, 75 mg, Oral, Daily, Shania Suarez APRN - CNP    magnesium oxide (MAG-OX) tablet 400 mg, 400 mg, Oral, BID, MAL Rebolledo CNP, 400 mg at 22 0911    metoprolol tartrate (LOPRESSOR) tablet 50 mg, 50 mg, Oral, BID, MAL Rebolledo CNP, 50 mg at 22 0911    miconazole (MICOTIN) 2 % powder, , Topical, BID, MAL Rebolledo CNP, Given at 22 9699    therapeutic multivitamin-minerals 1 tablet, 1 tablet, Oral, Daily, MAL Rebolledo CNP, 1 tablet at 22 0911    pantoprazole (PROTONIX) injection 40 mg, 40 mg, IntraVENous, Daily, 40 mg at 22 0225 **AND** sodium chloride flush 0.9 % injection 10 mL, 10 mL, IntraVENous, Daily, MAL Rebolledo CNP    ciprofloxacin (CIPRO) tablet 500 mg, 500 mg, Oral, Daily, MAL Rebolledo CNP, 500 mg at 22 0734    0.9 % sodium chloride infusion, , IntraVENous, PRN, MAL Rebolledo CNP  Maritza Simpson  Maricarmen Levels ON 3/3/2022] cetirizine HCl (ZYRTEC) 5 MG/5ML solution 5 mg, 5 mg, Oral, Daily, MAL Rebolledo CNP    iron sucrose (VENOFER) 200 mg in sodium chloride 0.9 % 100 mL IVPB, 200 mg, IntraVENous, Q24H, Rut Castillo MD, Stopped at 22 1704       ALLERGIES:   Allergies   Allergen Reactions    Latex Rash    Aleve [Naproxen Sodium]      Chronic kidney disease stage III, CHF    Pioglitazone Other (See Comments)     Congestive heart failure    Claritin [Loratadine]     Keflex [Cephalexin]     Lisinopril      Needs clarification of contraindication          FAMILY HISTORY: The patient's family history was reviewed. SOCIAL HISTORY:   Social History     Socioeconomic History    Marital status:       Spouse name: Not on file    Number of children: Not on file    Years of education: Not on file    Highest education level: Not on file   Occupational History    Not on file   Tobacco Use    Smoking status: Former Smoker     Packs/day: 0.25     Years: 10.00     Pack years: 2.50     Quit date: 2000     Years since quittin.1    Smokeless tobacco: Never Used   Vaping Use    Vaping Use: Never used   Substance and Sexual Activity    Alcohol use: Not Currently     Alcohol/week: 0.0 standard drinks    Drug use: No    Sexual activity: Not on file   Other Topics Concern    Not on file   Social History Narrative    Not on file     Social Determinants of Health     Financial Resource Strain: Low Risk     Difficulty of Paying Living Expenses: Not very hard   Food Insecurity: No Food Insecurity    Worried About Running Out of Food in the Last Year: Never true    Holden of Food in the Last Year: Never true   Transportation Needs:     Lack of Transportation (Medical): Not on file    Lack of Transportation (Non-Medical): Not on file   Physical Activity:     Days of Exercise per Week: Not on file    Minutes of Exercise per Session: Not on file   Stress:     Feeling of Stress : Not on file   Social Connections:     Frequency of Communication with Friends and Family: Not on file    Frequency of Social Gatherings with Friends and Family: Not on file    Attends Jew Services: Not on file    Active Member of [address] [place] [address] or Organizations: Not on file    Attends Club or Organization Meetings: Not on file    Marital Status: Not on file   Intimate Partner Violence:     Fear of Current or Ex-Partner: Not on file    Emotionally Abused: Not on file    Physically Abused: Not on file    Sexually Abused: Not on file   Housing Stability:     Unable to Pay for Housing in the Last Year: Not on file    Number of [place] in the Last Year: Not on file    Unstable Housing in the Last Year: Not on file         REVIEW OF SYSTEMS: A 14-point review of systems was obtained and pertinent positives andnegatives were enumerated above in the history of present illness. All other reviewed systems / symptoms were negative. Review of Systems      PHYSICAL EXAMINATION: Vital signs reviewed per the nursing documentation.     /66   Pulse 82   Temp 97 °F (36.1 °C) (Axillary)   Resp 18   Ht 4' 11\" (1.499 m)   Wt 135 lb (61.2 kg)   SpO2 92%   BMI 27.27 kg/m²    [unfilled]   Body mass index is 27.27 kg/m². General:  A O x 3 in NAD   Psych: . Normal affect. Mentation normal   HEENT: PERRLA. Clear conjunctivae and sclerae. Moist oral mucosae, no lesions orulcers. The neck is supple, without lymphadenopathy or jugular venous distension. No masses. Normal thyroid. Cardiovascular: S1 S2 RRR no rubs or murmurs. Pulmonary: clear BL. No accessory muscle usage. Abd Exam: Soft, NT ND, no hepato or spleno megaly, +BS, no ascites. No groin masses or lymphadenopathy. Extremities: No edema. Skin: Warm skin. No skin rash. No spider nevi palmar erythema naildystrophy. Joint: No joint swelling or deformity. Neurological: intact sensory. DTR+.  No asterixis     LABORATORY DATA: Reviewed   Lab Results   Component Value Date    WBC 10.9 03/02/2022    HGB 6.2 (LL) 03/02/2022    HCT 19.4 (L) 03/02/2022    MCV 90.1 03/02/2022     03/02/2022     03/02/2022    K 3.8 03/02/2022     03/02/2022    CO2 19 (L) 03/02/2022     (HH) 03/02/2022    CREATININE 1.86 (H) 03/02/2022    LABPROT 6.9 07/20/2012    LABALBU 3.3 (L) 02/06/2022    BILITOT 0.29 (L) 02/06/2022    ALKPHOS 230 (H) 02/06/2022    AST 32 (H) 02/06/2022    ALT 22 02/06/2022    INR 1.3 02/06/2022      Lab Results   Component Value Date    RBC 2.15 (L) 03/02/2022    HGB 6.2 (LL) 03/02/2022    MCV 90.1 03/02/2022    MCH 29.0 03/02/2022    MCHC 32.2 03/02/2022    RDW 16.5 (H) 03/02/2022    MPV 8.9 03/02/2022    BASOPCT 0 03/01/2022    LYMPHSABS 0.71 (L) 03/01/2022    MONOSABS 0.51 03/01/2022    NEUTROABS 8.98 03/01/2022    EOSABS 0.00 03/01/2022    BASOSABS 0.00 03/01/2022          DIAGNOSTIC TESTING:   ECHO Complete 2D W Doppler W Color    Result Date: 2/7/2022  [address] Transthoracic Echocardiography Report (TTE)  Patient Name [id number]       Date of Study                 02/07/2022               Cheyenne Gill Date of      1950  Gender                        Female  Birth   Age          66 year(s)  Race                             Room Number  2090        Height:                       58.66 inch, 149 cm   Corporate ID [id number]    Weight:                       132 pounds, 60 kg  #   Patient Acct [de-identified]   BSA:           1.54 m^2       BMI:      27.03  #                                                                kg/m^2   MR #         [id number]      Jessenia Marie   Accession #  [id number]  Interpreting Physician        Angel Connolly   Fellow                   Referring Nurse Practitioner   Interpreting             Referring Physician           Jann Duong  Fellow                                                 Krys Serrato  Type of Study   TTE procedure:2D Echocardiogram, M-Mode, Doppler, Color Doppler. Procedure Date Date: 02/07/2022 Start: 11:37 AM Study Location: [address] [place] [address] Technical Quality: Fair visualization Indications:Congestive heart failure. History / Tech. Comments: CABG DM CKD COPD HTN AF Patient Status: Inpatient Height: 58.66 inches Weight: 132.3 pounds BSA: 1.54 m^2 BMI: 27.03 kg/m^2 Rhythm: Within normal limits Allergies   - Lisinopril. - Keflex. - *Unlisted:(Aleve,Claratin, Pioglitazone). CONCLUSIONS Summary Normal left ventricle size and function with an estimated EF > 55%. No segmental wall motion abnormalities seen. Moderate left ventricular hypertrophy. Normal right ventricular size and function. Left atrial dilatation. Aortic valve is trileaflet. Mild aortic stenosis. Mild-moderate aortic insufficiency. Normal aortic root dimension. Mitral annular calcification is seen. Mild mitral regurgitation. Mild tricuspid regurgitation. Normal right ventricular systolic pressure. No significant pericardial effusion is seen. Pleural effusion present.  Signature ---------------------------------------------------------------------------- Electronically signed by Saira Price(Sonographer) on 2022 12:16  PM ---------------------------------------------------------------------------- ----------------------------------------------------------------------------  Electronically signed by Armando Morales(Interpreting physician) on  2022 12:19 PM ---------------------------------------------------------------------------- FINDINGS Left Atrium Left atrial dilatation. Left Ventricle Normal left ventricle size and function with an estimated EF > 55%. No segmental wall motion abnormalities seen. Moderate left ventricular hypertrophy. Right Atrium Right atrial dilatation. Right Ventricle Normal right ventricular size and function. Mitral Valve Mitral annular calcification is seen. Mild mitral regurgitation. Aortic Valve Aortic valve is trileaflet. Mild aortic stenosis. Mild-moderate aortic insufficiency. Tricuspid Valve Normal tricuspid valve structure and function. Mild tricuspid regurgitation. Normal right ventricular systolic pressure. Pulmonic Valve The pulmonic valve is normal in structure. Mild pulmonic insufficiency. Pericardial Effusion No significant pericardial effusion is seen. Pleural Effusion Pleural effusion present. Miscellaneous Normal aortic root dimension. E/e' average 16.5 IVC normal diameter & inspiratory collapse indicating normal RA filling pressure .  M-mode / 2D Measurements & Calculations:   LVIDd:4 cm(3.7 - 5.6 cm)         Diastolic UYZTLJ:71 ml  ODGPF:3.73 cm(2.2 - 4.0 cm)      Systolic FSGCPR:61.8 ml  OIXB:6.61 cm(0.6 - 1.1 cm)       Aortic Root:2.9 cm(2.0 - 3.7 cm)  LVPWd:1.53 cm(0.6 - 1.1 cm)      LA Dimension: 5.4 cm(1.9 - 4.0 cm)  Fractional Shortenin %       LA volume/Index: 56.5 ml /37m^2  Calculated LVEF (%): 73.75 %     LVOT:2.2 cm   Mitral:                                 Aortic   Valve Area (P1/2-Time): 2.68 cm^2       Peak Velocity: 2.09 m/s  Peak E-Wave: 1.24 m/s                   Mean Velocity: 1.34 m/s Peak A-Wave: 0.92 m/s                   Peak Gradient: 17.47 mmHg  E/A Ratio: 1.35                         Mean Gradient: 9 mmHg  Peak Gradient: 6.15 mmHg                AI P1/2t: 325 msec  Mean Gradient: 3 mmHg  Deceleration Time: 169 msec             Area (continuity): 1.84 cm^2  P1/2t: 82 msec                          AV VTI: 51.2 cm   Area (continuity): 2.32 cm^2  Mean Velocity: 0.85 m/s   Tricuspid:   Peak TR Velocity: 3.03 m/s  Peak TR Gradient: 36.7236 mmHg  Septal Wall E' velocity:0.07 m/s Lateral Wall E' velocity:0.09 m/s    US LIVER    Result Date: 3/2/2022  EXAMINATION: RIGHT UPPER QUADRANT ULTRASOUND 3/2/2022 1:45 pm COMPARISON: CT abdomen and pelvis May 25, 2021 HISTORY: ORDERING SYSTEM PROVIDED HISTORY: elevated lft TECHNOLOGIST PROVIDED HISTORY: elevated lft FINDINGS: LIVER:  Diffuse increased liver parenchymal echogenicity is nonspecific but can be seen in patients with hepatic steatosis or diffuse hepatocellular process. .  No focal liver lesion. No intrahepatic biliary ductal dilatation. The portal vein is patent and demonstrates biphasic flow which can be seen in patients with tricuspid regurgitation. BILIARY SYSTEM:  Gallbladder is unremarkable without evidence of pericholecystic fluid, wall thickening or stones. Negative sonographic Graf's sign. Common bile duct is within normal limits measuring 4 mm. RIGHT KIDNEY: Diffuse increased renal parenchymal echogenicity suggestive chronic medical renal.  No right hydronephrosis PANCREAS:  Visualized portions of the pancreas are unremarkable. OTHER: No evidence of right upper quadrant ascites. Diffuse increased heterogeneous liver parenchymal echogenicity could be seen in patients with hepatic steatosis or diffuse hepatocellular process. Patent portal vein with biphasic flow can be seen in patient with tricuspid regurgitation.  RECOMMENDATIONS: Unavailable     XR CHEST PORTABLE    Result Date: 3/1/2022  EXAMINATION: ONE XRAY VIEW OF THE CHEST 3/1/2022 7:43 pm COMPARISON: February 6, 2022 HISTORY: ORDERING SYSTEM PROVIDED HISTORY: fatigue TECHNOLOGIST PROVIDED HISTORY: fatigue Reason for Exam: Fatigue, shortness of breath Additional signs and symptoms: Fatigue, shortness of breath Relevant Medical/Surgical History: Fatigue, shortness of breath FINDINGS: Marginal inspiration is present. Cardiomegaly is present. Vascular markings are engorged. Small pleural effusions are noted. No pneumothorax is present. Patient is status post prior sternotomy. No acute osseous abnormality is present. 1. Cardiomegaly, mild vascular congestion, improved since the prior study 2. Small bilateral pleural effusions     XR CHEST PORTABLE    Result Date: 2/6/2022  EXAMINATION: ONE XRAY VIEW OF THE CHEST 2/6/2022 10:03 am COMPARISON: 05/25/2021 HISTORY: Reason for Exam: sob FINDINGS: Mild cardiomegaly. Generalized interstitial prominence suggesting vascular congestion. Mild bilateral pleural effusions with associated atelectasis. No pneumothorax. Stable postsurgical changes of CABG. Findings suggesting likely CHF as described. IMPRESSION: Ms. Tiny Murrieta is a 66 y.o. female with iron deficiency anemia with acute drop in H&H. Hold blood thinners. Plan for EGD and colonoscopy very likely Friday. Monitor H&H and transfuse as needed keep hemoglobin above 7. Liver ultrasound. Accordingly may need liver serologies. Thank you for allowing me to participate in the care of Ms. Ojeda. For any further questions please do not hesitate to contact me.        MD Ana Ingram

## 2022-03-02 NOTE — ED TRIAGE NOTES
Mode of arrival (squad #, walk in, police, etc) : walk in        Chief complaint(s): weakness, short of breath with exertion        Arrival Note (brief scenario, treatment PTA, etc).: Pt's family brought Pt in due to pale color, increased weakness and shortness of breath with exertion. C= \"Have you ever felt that you should Cut down on your drinking? \"  No  A= \"Have people Annoyed you by criticizing your drinking? \"  No  G= \"Have you ever felt bad or Guilty about your drinking? \"  No  E= \"Have you ever had a drink as an Eye-opener first thing in the morning to steady your nerves or to help a hangover? \"  No      Deferred []      Reason for deferring: N/A    *If yes to two or more: probable alcohol abuse. *

## 2022-03-02 NOTE — PLAN OF CARE
Nutrition Problem #1: Inadequate oral intake  Intervention: Food and/or Nutrient Delivery: Continue Current Diet,Start Oral Nutrition Supplement  Nutritional Goals: Meet estimated nutrient needs

## 2022-03-02 NOTE — PROGRESS NOTES
Pt transferred to room 2106 from ED. Pt orientated to room and resting comfortably. Daughter present. Vitals taken and tele applied.

## 2022-03-03 ENCOUNTER — APPOINTMENT (OUTPATIENT)
Dept: GENERAL RADIOLOGY | Age: 72
DRG: 377 | End: 2022-03-03
Payer: OTHER GOVERNMENT

## 2022-03-03 PROBLEM — K92.2 ACUTE GI BLEEDING: Status: RESOLVED | Noted: 2022-03-02 | Resolved: 2022-03-03

## 2022-03-03 LAB
ANION GAP SERPL CALCULATED.3IONS-SCNC: 10 MMOL/L (ref 9–17)
BUN BLDV-MCNC: 92 MG/DL (ref 8–23)
CALCIUM SERPL-MCNC: 8.3 MG/DL (ref 8.6–10.4)
CHLORIDE BLD-SCNC: 106 MMOL/L (ref 98–107)
CO2: 22 MMOL/L (ref 20–31)
CREAT SERPL-MCNC: 1.8 MG/DL (ref 0.5–0.9)
GFR AFRICAN AMERICAN: 34 ML/MIN
GFR NON-AFRICAN AMERICAN: 28 ML/MIN
GFR SERPL CREATININE-BSD FRML MDRD: ABNORMAL ML/MIN/{1.73_M2}
GLUCOSE BLD-MCNC: 100 MG/DL (ref 65–105)
GLUCOSE BLD-MCNC: 103 MG/DL (ref 70–99)
GLUCOSE BLD-MCNC: 107 MG/DL (ref 65–105)
GLUCOSE BLD-MCNC: 87 MG/DL (ref 65–105)
GLUCOSE BLD-MCNC: 91 MG/DL (ref 65–105)
HCT VFR BLD CALC: 22.4 % (ref 36–46)
HCT VFR BLD CALC: 23.9 % (ref 36–46)
HEMOGLOBIN: 7.4 G/DL (ref 12–16)
HEMOGLOBIN: 7.7 G/DL (ref 12–16)
MCH RBC QN AUTO: 29.2 PG (ref 26–34)
MCHC RBC AUTO-ENTMCNC: 32.9 G/DL (ref 31–37)
MCV RBC AUTO: 88.9 FL (ref 80–100)
PATHOLOGIST REVIEW: NORMAL
PDW BLD-RTO: 15.4 % (ref 11.5–14.9)
PLATELET # BLD: 199 K/UL (ref 150–450)
PMV BLD AUTO: 9 FL (ref 6–12)
POTASSIUM SERPL-SCNC: 4 MMOL/L (ref 3.7–5.3)
PRO-BNP: ABNORMAL PG/ML
RBC # BLD: 2.52 M/UL (ref 4–5.2)
SODIUM BLD-SCNC: 138 MMOL/L (ref 135–144)
WBC # BLD: 11.5 K/UL (ref 3.5–11)

## 2022-03-03 PROCEDURE — 99233 SBSQ HOSP IP/OBS HIGH 50: CPT | Performed by: INTERNAL MEDICINE

## 2022-03-03 PROCEDURE — 2580000003 HC RX 258: Performed by: INTERNAL MEDICINE

## 2022-03-03 PROCEDURE — 6370000000 HC RX 637 (ALT 250 FOR IP): Performed by: NURSE PRACTITIONER

## 2022-03-03 PROCEDURE — 85014 HEMATOCRIT: CPT

## 2022-03-03 PROCEDURE — APPSS30 APP SPLIT SHARED TIME 16-30 MINUTES: Performed by: NURSE PRACTITIONER

## 2022-03-03 PROCEDURE — 80048 BASIC METABOLIC PNL TOTAL CA: CPT

## 2022-03-03 PROCEDURE — 85018 HEMOGLOBIN: CPT

## 2022-03-03 PROCEDURE — 71046 X-RAY EXAM CHEST 2 VIEWS: CPT

## 2022-03-03 PROCEDURE — 82947 ASSAY GLUCOSE BLOOD QUANT: CPT

## 2022-03-03 PROCEDURE — 6360000002 HC RX W HCPCS: Performed by: NURSE PRACTITIONER

## 2022-03-03 PROCEDURE — 85027 COMPLETE CBC AUTOMATED: CPT

## 2022-03-03 PROCEDURE — 6360000002 HC RX W HCPCS: Performed by: INTERNAL MEDICINE

## 2022-03-03 PROCEDURE — 2060000000 HC ICU INTERMEDIATE R&B

## 2022-03-03 PROCEDURE — 2580000003 HC RX 258: Performed by: NURSE PRACTITIONER

## 2022-03-03 PROCEDURE — 36415 COLL VENOUS BLD VENIPUNCTURE: CPT

## 2022-03-03 PROCEDURE — 97162 PT EVAL MOD COMPLEX 30 MIN: CPT

## 2022-03-03 PROCEDURE — 99232 SBSQ HOSP IP/OBS MODERATE 35: CPT | Performed by: INTERNAL MEDICINE

## 2022-03-03 PROCEDURE — 84080 ASSAY ALKALINE PHOSPHATASES: CPT

## 2022-03-03 PROCEDURE — 83880 ASSAY OF NATRIURETIC PEPTIDE: CPT

## 2022-03-03 PROCEDURE — 84075 ASSAY ALKALINE PHOSPHATASE: CPT

## 2022-03-03 PROCEDURE — C9113 INJ PANTOPRAZOLE SODIUM, VIA: HCPCS | Performed by: NURSE PRACTITIONER

## 2022-03-03 RX ORDER — ONDANSETRON 2 MG/ML
4 INJECTION INTRAMUSCULAR; INTRAVENOUS ONCE
Status: COMPLETED | OUTPATIENT
Start: 2022-03-03 | End: 2022-03-03

## 2022-03-03 RX ADMIN — ALLOPURINOL 300 MG: 300 TABLET ORAL at 09:53

## 2022-03-03 RX ADMIN — ONDANSETRON 4 MG: 2 INJECTION, SOLUTION INTRAMUSCULAR; INTRAVENOUS at 11:41

## 2022-03-03 RX ADMIN — ACETAMINOPHEN 650 MG: 325 TABLET, FILM COATED ORAL at 13:26

## 2022-03-03 RX ADMIN — ANTI-FUNGAL POWDER MICONAZOLE NITRATE TALC FREE: 1.42 POWDER TOPICAL at 09:59

## 2022-03-03 RX ADMIN — ALBUTEROL SULFATE 2.5 MG: 2.5 SOLUTION RESPIRATORY (INHALATION) at 12:00

## 2022-03-03 RX ADMIN — ACETAMINOPHEN 650 MG: 325 TABLET, FILM COATED ORAL at 06:31

## 2022-03-03 RX ADMIN — MAGNESIUM OXIDE TAB 400 MG (241.3 MG ELEMENTAL MG) 400 MG: 400 (241.3 MG) TAB at 09:54

## 2022-03-03 RX ADMIN — SODIUM CHLORIDE, PRESERVATIVE FREE 10 ML: 5 INJECTION INTRAVENOUS at 10:01

## 2022-03-03 RX ADMIN — CIPROFLOXACIN 500 MG: 500 TABLET, FILM COATED ORAL at 06:31

## 2022-03-03 RX ADMIN — ATORVASTATIN CALCIUM 40 MG: 40 TABLET, FILM COATED ORAL at 09:54

## 2022-03-03 RX ADMIN — SODIUM CHLORIDE: 9 INJECTION, SOLUTION INTRAVENOUS at 11:28

## 2022-03-03 RX ADMIN — AMLODIPINE BESYLATE 10 MG: 10 TABLET ORAL at 09:54

## 2022-03-03 RX ADMIN — MAGNESIUM OXIDE TAB 400 MG (241.3 MG ELEMENTAL MG) 400 MG: 400 (241.3 MG) TAB at 21:31

## 2022-03-03 RX ADMIN — METOPROLOL 50 MG: 50 TABLET ORAL at 09:58

## 2022-03-03 RX ADMIN — BISACODYL 10 MG: 5 TABLET, COATED ORAL at 11:36

## 2022-03-03 RX ADMIN — IRON SUCROSE 200 MG: 20 INJECTION, SOLUTION INTRAVENOUS at 11:29

## 2022-03-03 RX ADMIN — SODIUM CHLORIDE, PRESERVATIVE FREE 10 ML: 5 INJECTION INTRAVENOUS at 13:27

## 2022-03-03 RX ADMIN — PANTOPRAZOLE SODIUM 40 MG: 40 INJECTION, POWDER, FOR SOLUTION INTRAVENOUS at 10:00

## 2022-03-03 RX ADMIN — POLYETHYLENE GLYCOL 3350, SODIUM SULFATE ANHYDROUS, SODIUM BICARBONATE, SODIUM CHLORIDE, POTASSIUM CHLORIDE 4000 ML: 236; 22.74; 6.74; 5.86; 2.97 POWDER, FOR SOLUTION ORAL at 14:33

## 2022-03-03 RX ADMIN — CETIRIZINE HYDROCHLORIDE 5 MG: 5 SOLUTION ORAL at 09:58

## 2022-03-03 RX ADMIN — METOPROLOL 50 MG: 50 TABLET ORAL at 21:31

## 2022-03-03 RX ADMIN — MULTIPLE VITAMINS W/ MINERALS TAB 1 TABLET: TAB at 09:58

## 2022-03-03 RX ADMIN — ANTI-FUNGAL POWDER MICONAZOLE NITRATE TALC FREE: 1.42 POWDER TOPICAL at 21:31

## 2022-03-03 ASSESSMENT — PAIN SCALES - GENERAL
PAINLEVEL_OUTOF10: 4
PAINLEVEL_OUTOF10: 3

## 2022-03-03 NOTE — CARE COORDINATION
ONGOING DISCHARGE PLAN:    Patient is alert and oriented x4. Spoke with patient regarding discharge plan and patient confirms that plan is still to discharge to home with her family denies vns  Patient will have a colonoscopy/ EGD done tomorrow   cxr  Iv venofer   HBG 7.4     Will continue to follow for additional discharge needs.     Electronically signed by Bimal Lewis RN on 3/3/2022 at 12:38 PM

## 2022-03-03 NOTE — PROGRESS NOTES
Physical Therapy    Facility/Department: [facility] PROGRESSIVE CARE  Initial Assessment    NAME: Reynaldo Damon  :   MRN: [id number]    Date of Service: 3/3/2022    Discharge Recommendations:  Patient would benefit from continued therapy after discharge   PT Equipment Recommendations  Equipment Needed: No    Assessment   Body structures, Functions, Activity limitations: Decreased functional mobility ; Decreased balance;Decreased strength;Decreased endurance  Assessment: Impaired mobility due to decreased tolerance to activity and safety issues of decreased strength and balance  Decision Making: Medium Complexity  History: CVA  Exam: decreased strength, balance, endurance  Clinical Presentation: evolving  REQUIRES PT FOLLOW UP: Yes  Activity Tolerance  Activity Tolerance: Patient limited by endurance       Patient Diagnosis(es): The encounter diagnosis was Symptomatic anemia. has a past medical history of Acute CVA (cerebrovascular accident) ([address] Altered mental status, Arthritis, Brain mass, Cellulitis and abscess, Cellulitis and abscess of unspecified site, Cellulitis of both lower extremities, Cellulitis of left lower extremity, Cellulitis of lower extremity, CHF (congestive heart failure) ([address] Chronic kidney disease, unspecified, Coronary atherosclerosis of native coronary artery, Essential hypertension, Essential hypertension, malignant, Hallucinations, Hard of hearing, Head contusion, Hypokalemia, Iron deficiency anemia, unspecified, Meningioma ([address] Myocardial infarction ([address] Other and unspecified hyperlipidemia, Pure hypercholesterolemia, Toxic metabolic encephalopathy, Type II or unspecified type diabetes mellitus without mention of complication, not stated as uncontrolled, Unspecified asthma(493.90), Unspecified venous (peripheral) insufficiency, Unspecified vitamin D deficiency, and UTI (urinary tract infection).    has a past surgical history that includes Tonsillectomy and adenoidectomy and Coronary artery bypass graft. Restrictions  Restrictions/Precautions  Restrictions/Precautions: Fall Risk        Subjective  General  Family / Caregiver Present: Yes  Follows Commands: Within Functional Limits  Subjective  Subjective: pt pleasant and cooperative  Pain Screening  Patient Currently in Pain: Denies  Vital Signs  Patient Currently in Pain: Denies       Orientation  Orientation  Overall Orientation Status: Within Functional Limits  Social/Functional History  Social/Functional History  Lives With: Son  Type of Home: House  Home Layout: Two level,1/2 bath on main level,Bed/Bath upstairs (pt sleeps on couch on main floor)  Home Access: Stairs to enter with rails  Entrance Stairs - Number of Steps: 3  Entrance Stairs - Rails: Left  Bathroom Toilet: Standard  Home Equipment: Rolling walker  Receives Help From: Family  ADL Assistance: Independent  Ambulation Assistance: Independent (RW outside of home)  Transfer Assistance: Independent  Additional Comments: son works part-time but other family members can stay with pt during the day also       Objective     Observation/Palpation  Observation: O2 per NC at 2L    AROM RLE (degrees)  RLE AROM: WFL  AROM LLE (degrees)  LLE AROM : WFL  Strength RLE  R Hip Flexion: 3-/5  R Knee Flexion: 3+/5  R Knee Extension: 3+/5  R Ankle Dorsiflexion: 3+/5  Strength LLE  L Hip Flexion: 3-/5  L Knee Flexion: 3+/5  L Knee Extension: 3+/5  L Ankle Dorsiflexion: 3+/5        Bed mobility  Supine to Sit: Minimal assistance  Sit to Supine: Minimal assistance  Scooting: Contact guard assistance  Comment: head of bed elevated to 30 degrees  Transfers  Sit to Stand: Minimal Assistance  Stand to sit: Minimal Assistance  Comment: pt stood bedside x 3 for 30sec ea  Ambulation  Ambulation? :  (pt took few side-steps up toward  head of bed with Mehdi)  Stairs/Curb  Stairs?: No     Balance  Sitting - Static: Good  Sitting - Dynamic: Fair;+ (SBA)  Standing - Static: Fair (CGA)  Standing - Dynamic: Fair;- (Mehdi)        Plan   Plan  Times per week: 6-7x/wk  Current Treatment Recommendations: Strengthening,Balance Training,Endurance Training,Functional Mobility Training,Transfer Training,Gait Training,Stair training  Safety Devices  Type of devices: Left in bed,Patient at risk for falls,Call light within reach,Bed alarm in place      Goals  Short term goals  Time Frame for Short term goals: 3-4 days  Short term goal 1: mod-I bed mobility  Short term goal 2: mod-I transfers  Short term goal 3: mod-I gait with RW x 25-50ft  Short term goal 4: negotiate 3 steps with rail/CGA       Therapy Time   Individual Concurrent Group Co-treatment   Time In 1410         Time Out 1430         Minutes [address] [place] [address],

## 2022-03-03 NOTE — PROGRESS NOTES
This RN agrees with Paul Veliz RN's charting throughout the duration of this shift. Electronically signed by Angelica Pinon RN.

## 2022-03-03 NOTE — CONSULTS
Department of Internal Medicine  Nephrology Santy Castro MD   Consult Note    Reason for consultation: Management of acute kidney injury superimposed on chronic kidney disease stage III. Consulting physician: Radha Sánchez MD.    History of present illness: This is a 66 y.o. female with a significant past medical history of Heart Failure with reduced ejection fraction [HFrEF with LVEF 45 to 50% in July 2020 secondary to ischemic cardiomyopathy], Coronary artery disease, Meningioma, type 2 diabetes mellitus [with nonproliferative diabetic retinopathy], essential hypertension, bilateral deafness and chronic kidney disease stage III [baseline serum creatinine 1.6 mg/dL and follows up with Dr. Fabrizio Montemayor of renal services of [facility] whom she saw 1 week ago and was switched from Lasix to Bumex], presented with complaints of fatigue and dyspnea with exertion progressively worsened over 2 weeks. She had been recently admitted and seen by us at [facility] on 2/11/2022. Laboratory studies at presentation however were remarkable for hemoglobin 7.4 g/dL and BUN/creatinine 110/2.11 mg/dL. She denies hemoptysis or melena stools. However, hemoglobin dropped overnight to 4.4 g/dL and she is scheduled for endoscopy tomorrow.     Latex, Aleve [naproxen sodium], Pioglitazone, Claritin [loratadine], Keflex [cephalexin], and Lisinopril    Past Medical History:   Diagnosis Date    Acute CVA (cerebrovascular accident) ([facility] 7/25/2020    Altered mental status 5/26/2021    Arthritis     Brain mass     Cellulitis and abscess     Cellulitis and abscess of unspecified site     Cellulitis of both lower extremities 5/26/2021    Cellulitis of left lower extremity 7/26/2020    Cellulitis of lower extremity 10/4/2020    CHF (congestive heart failure) (HCC)     Chronic kidney disease, unspecified     Coronary atherosclerosis of native coronary artery     Essential hypertension 7/25/2020    Essential History    Marital status:      Spouse name: None    Number of children: None    Years of education: None    Highest education level: None   Occupational History    None   Tobacco Use    Smoking status: Former Smoker     Packs/day: 0.25     Years: 10.00     Pack years: 2.50     Quit date: 2000     Years since quittin.1    Smokeless tobacco: Never Used   Vaping Use    Vaping Use: Never used   Substance and Sexual Activity    Alcohol use: Not Currently     Alcohol/week: 0.0 standard drinks    Drug use: No    Sexual activity: None   Other Topics Concern    None   Social History Narrative    None     Social Determinants of Health     Financial Resource Strain: Low Risk     Difficulty of Paying Living Expenses: Not very hard   Food Insecurity: No Food Insecurity    Worried About Running Out of Food in the Last Year: Never true    Holden of Food in the Last Year: Never true   Transportation Needs:     Lack of Transportation (Medical): Not on file    Lack of Transportation (Non-Medical):  Not on file   Physical Activity:     Days of Exercise per Week: Not on file    Minutes of Exercise per Session: Not on file   Stress:     Feeling of Stress : Not on file   Social Connections:     Frequency of Communication with Friends and Family: Not on file    Frequency of Social Gatherings with Friends and Family: Not on file    Attends Cheondo[employer] Services: Not on file    Active Member of [address] [place] [address] or Organizations: Not on file    Attends Club or Organization Meetings: Not on file    Marital Status: Not on file   Intimate Partner Violence:     Fear of Current or Ex-Partner: Not on file    Emotionally Abused: Not on file    Physically Abused: Not on file    Sexually Abused: Not on file   Housing Stability:     Unable to Pay for Housing in the Last Year: Not on file    Number of [place] in the Last Year: Not on file    Unstable Housing in the Last Year: Not on file     Review of systems: CNS - no headache or dizziness; Cardiac - no chest pain; Respiratory - no shortness of breath; Gastrointestinal - Nausea, vomiting or diarrhea; Musculoskeletal - general body aches; Skin/Integument - no rashes. Physical Exam:    VITALS:  BP (!) 127/54   Pulse 72   Temp 98.1 °F (36.7 °C) (Oral)   Resp 20   Ht 4' 11\" (1.499 m)   Wt 134 lb 14.7 oz (61.2 kg)   SpO2 97%   BMI 27.25 kg/m²     Constitutional: alert, appears stated age and cooperative    Skin: Skin color, texture, turgor normal. No rashes or lesions    Head: Normocephalic, without obvious abnormality, atraumatic     Cardiovascular/Edema: regular rate and rhythm, S1, S2 normal, no murmur, click, rub or gallop    Respiratory: Lungs: clear to auscultation bilaterally    Abdomen: soft, non-tender; bowel sounds normal; no masses,  no organomegaly    Back: symmetric, no curvature. ROM normal. No CVA tenderness. Extremities: extremities normal, atraumatic, no cyanosis or edema    Neuro:  Grossly normal      CBC:   Recent Labs     03/01/22 2207 03/01/22 2207 03/02/22 0521 03/02/22 2157 03/03/22  0444   WBC 10.2  --  10.9  --  11.5*   HGB 4.4*   < > 6.2* 7.4* 7.4*     --  195  --  199    < > = values in this interval not displayed.      BMP:    Recent Labs     03/01/22 2207 03/02/22 0521 03/03/22  0444    137 138   K 3.9 3.8 4.0    104 106   CO2 19* 19* 22   * 104* 92*   CREATININE 2.11* 1.86* 1.80*   GLUCOSE 178* 139* 103*       Lab Results   Component Value Date    NITRU NEGATIVE 02/22/2022    COLORU Yellow 02/22/2022    PHUR 5.0 02/22/2022    WBCUA 10 TO 20 02/22/2022    RBCUA TOO NUMEROUS TO COUNT 02/22/2022    MUCUS NOT REPORTED 02/07/2022    TRICHOMONAS NOT REPORTED 02/07/2022    YEAST NOT REPORTED 02/07/2022    BACTERIA None 02/22/2022    SPECGRAV 1.016 02/22/2022    LEUKOCYTESUR NEGATIVE 02/22/2022    UROBILINOGEN Normal 02/22/2022    BILIRUBINUR NEGATIVE 02/22/2022    GLUCOSEU TRACE 02/22/2022    KETUA NEGATIVE 02/22/2022    AMORPHOUS NOT REPORTED 02/07/2022     Urine Protein:     Lab Results   Component Value Date     10/27/2020     Urine Creatinine:     Lab Results   Component Value Date    LABCREA 26.2 05/26/2021     IMPRESSION/RECOMMENDATIONS:      1. Acute kidney injury superimposed on chronic kidney disease stage III most consistent with prerenal azotemia led to dehydration and anemia. Plan: Continue IV fluid 0.9 normal saline at 75 mL/h. Urinalysis and urine microscopy. Random urine electrolytes. Basic metabolic profile daily. 2.  Systemic hypertension controlled. 3.  Acute on chronic anemia - rule out GI bleed. Plavix is on hold. Prognosis is guarded. Thank you very much for the courtesy and confidence of this consultation.     Dorcas Luo MD FACP  Attending Nephrologist  3/3/2022 10:12 AM

## 2022-03-03 NOTE — PROGRESS NOTES
[facility]    Date:   3/3/2022  Patient name:  Jean Jacobs  Date of admission:  3/1/2022  9:47 PM  MRN:   [id number]  YOB: 1950      HPI        Overnight no fever chills loss of appetite positive for dyspnea on rest on nasal cannula  REVIEW OF SYSTEMS:    · Cardiovascular: Negative for lightheadedness/orthostatic symptoms ,chest pain, dyspnea on exertion, palpitations or loss of consciousness. · Respiratory: Positive for dyspnea on rest  · Gastrointestinal: Negative for nausea/vomiting, change in bowel habits, abdominal pain, dysphagia/appetite loss, hematemesis, blood in stools. OBJECTIVE:    BP (!) 126/46   Pulse 83   Temp 98.2 °F (36.8 °C) (Oral)   Resp 24   Ht 4' 11\" (1.499 m)   Wt 134 lb 14.7 oz (61.2 kg)   SpO2 90%   BMI 27.25 kg/m²    On nasal cannula oxygen look dyspneic  · Lungs: Bibasal crackles  · Heart: regular rate and rhythm, S1, S2 normal, no murmur, click, rub or gallop  · Abdomen: soft, non-tender; bowel sounds normal; no masses,  no organomegaly  · Extremities: extremities normal, atraumatic, no cyanosis or edema  · Neurological:  Reflexes normal and symmetric. Sensation grossly normal  · Skin - no rash, no lump   · Eye- no icterus no redness  · GI-oral mucosa moist,  · Lymphatic system-no lymphadenopathy no splenomegaly  · Mouth- mucous membrane moist  · Head-normocephalic atraumatic  · Neck- supple no carotid bruit,Thyroid not palpable.       Past Medical History:   has a past medical history of Acute CVA (cerebrovascular accident) ([address] Altered mental status, Arthritis, Brain mass, Cellulitis and abscess, Cellulitis and abscess of unspecified site, Cellulitis of both lower extremities, Cellulitis of left lower extremity, Cellulitis of lower extremity, CHF (congestive heart failure) ([address] Chronic kidney disease, unspecified, Coronary atherosclerosis of native coronary artery, Essential hypertension, Essential hypertension, malignant, Hallucinations, Hard of hearing, Head contusion, Hypokalemia, Iron deficiency anemia, unspecified, Meningioma ([address] Myocardial infarction ([address] Other and unspecified hyperlipidemia, Pure hypercholesterolemia, Toxic metabolic encephalopathy, Type II or unspecified type diabetes mellitus without mention of complication, not stated as uncontrolled, Unspecified asthma(493.90), Unspecified venous (peripheral) insufficiency, Unspecified vitamin D deficiency, and UTI (urinary tract infection). Past Surgical History:   has a past surgical history that includes Tonsillectomy and adenoidectomy and Coronary artery bypass graft. Home Medications:    Prior to Admission medications    Medication Sig Start Date End Date Taking?  Authorizing Provider   vitamin D (ERGOCALCIFEROL) 1.25 MG (12664 UT) CAPS capsule Take 1 capsule by mouth once a week  Patient taking differently: Take 50,000 Units by mouth once a week Sundays 3/1/22  Yes Awa Frye MD   ciprofloxacin (CIPRO) 500 MG tablet Take 1 tablet by mouth 2 times daily for 3 days 2/28/22 3/3/22 Yes Kendell Eddy MD   albuterol sulfate HFA (PROAIR HFA) 108 (90 Base) MCG/ACT inhaler Inhale 2 puffs into the lungs every 6 hours as needed for Wheezing or Shortness of Breath (cough) 2/22/22  Yes Kendell Eddy MD   apixaban (ELIQUIS) 5 MG TABS tablet Take 1 tablet by mouth 2 times daily 2/15/22  Yes Awa Frey MD   allopurinol (ZYLOPRIM) 300 MG tablet Take 1 tablet by mouth daily Per rheumatologist 2/15/22  Yes Awa Frye MD   amLODIPine (NORVASC) 10 MG tablet Take 1 tablet by mouth daily FOR GOUT 2/15/22  Yes Awa Frye MD   atorvastatin (LIPITOR) 40 MG tablet Take 1 tablet by mouth once daily 2/15/22  Yes Awa Frye MD   bumetanide (BUMEX) 1 MG tablet Take 1 tablet by mouth 2 times daily 2/15/22  Yes Awa Frye MD   chlorhexidine (HIBICLENS) 4 % external liquid for decontamination AND [address] LEGS EVERY DAY 2/15/22  Yes Vickie Danielson MD   clopidogrel (PLAVIX) 75 MG tablet Take 1 tablet by mouth daily 2/15/22  Yes Vickie Danielson MD   desloratadine (CLARINEX) 5 MG tablet Take 1 tablet by mouth once daily 2/15/22  Yes Vickie Danielson MD   magnesium oxide (MAG-OX) 400 (241.3 Mg) MG TABS tablet Take 1 tablet by mouth twice daily 2/15/22  Yes Vickie Danielson MD   metoprolol tartrate (LOPRESSOR) 50 MG tablet Take 1 tablet by mouth 2 times daily 2/15/22  Yes Vickie Danielson MD   miconazole (MICOTIN) 2 % powder Apply topically 2 times daily UNDER THE SKIN FOLDS LONG TERM 2/15/22  Yes Vickie Danielson MD   mupirocin (BACTROBAN) 2 % cream Apply 2 times dailY X 10 DAYS ON OPEN BLISTERS ON THE LEGS. 2/15/22 3/17/22 Yes Vickie Danielson MD   Multiple Vitamins-Minerals (THERAPEUTIC MULTIVITAMIN-MINERALS) tablet Take 1 tablet by mouth daily 2/15/22  Yes Vickie Danielson MD   colchicine (COLCRYS) 0.6 MG tablet Take 2 tablets now, then 1 tablet one hour later. Wait 12 hours then start 1 tablet bid for 5 days. Patient taking differently: Take 0.6 mg by mouth daily Take 2 tablets now, then 1 tablet one hour later. Wait 12 hours then start 1 tablet bid for 5 days. 2/15/22  Yes Vickie Danielson MD   Nebulizers (COMPRESSOR/NEBULIZER) MISC Nebulizer with compressor. Disposable Med Nebs 2 /month. Reusable Med Nebs 1 per 6 months. Aerosol Mask 1 per month. Replacement Filters 2 per month. All other related supplies as needed per month. Medications being used: Albuterol . 083 unit dose vial. Frequency: every 6 hrs x 4/day . Length of Need: 1 2/22/22   Luiz Borjas MD   albuterol (PROVENTIL) (2.5 MG/3ML) 0.083% nebulizer solution Take 3 mLs by nebulization every 6 hours as needed for Wheezing or Shortness of Breath 2/22/22   Luiz Borjas MD   [facility].  Devices (ADJUST FOLD CANE/YORK HANDLE) MISC Use daily for walking 2/22/22   MD Cj Major MISC by Does MG 1.8     S. Phosphorus:No results for input(s): PHOS in the last 72 hours. S. Glucose:  Recent Labs     03/02/22 2011 03/03/22  0609   POCGLU 173* 87     Glycosylated hemoglobin A1C: No results for input(s): LABA1C in the last 72 hours. INR: No results for input(s): INR in the last 72 hours. Hepatic functions: No results for input(s): ALKPHOS, ALT, AST, PROT, BILITOT, BILIDIR, LABALBU in the last 72 hours. Pancreatic functions:No results for input(s): LACTA, AMYLASE in the last 72 hours. S. Lactic Acid: No results for input(s): LACTA in the last 72 hours. Cardiac enzymes:No results for input(s): CKTOTAL, CKMB, CKMBINDEX, TROPONINI in the last 72 hours. BNP:No results for input(s): BNP in the last 72 hours. Lipid profile: No results for input(s): CHOL, TRIG, HDL, LDL, LDLCALC in the last 72 hours. Blood Gases: No results found for: PH, PCO2, PO2, HCO3, O2SAT  Thyroid functions:   Lab Results   Component Value Date    TSH 2.60 12/06/2021        Imaging/Diagonstics:  EKG: Normal sinus rhythm    CXR: No acute cardiopulmonary findings. ASSESSMENT:    Patient Active Problem List   Diagnosis    Vitamin D deficiency    Venous insufficiency of both lower extremities    Asthma    Type 2 diabetes mellitus with stage 3 chronic kidney disease, without long-term current use of insulin ([address]    Coronary artery disease involving native coronary artery of native heart without angina pectoris    Iron deficiency anemia    Stage 3 chronic kidney disease ([address]    Colonoscopy refused    Pneumococcal vaccination declined    Impaired hearing    Chronic diastolic CHF (congestive heart failure) ([address]    COPD (chronic obstructive pulmonary disease) ([facility]    HTN.  Benign hypertension with CKD (chronic kidney disease) stage III (HCC)    Gout with tophi    Hyperlipidemia with target LDL less than 70    Dry skin dermatitis HANDS    Meningioma, cerebral (HCC)    Paroxysmal atrial fibrillation ([address]    Influenza vaccination declined    Recurrent UTI    Elevated LFTs    Hypomagnesemia    Kidney stones    Diverticulosis    COVID-19 vaccination declined    Acute kidney injury ([facility]    Acute on chronic combined systolic (congestive) and diastolic (congestive) heart failure (HCC)    Chronic a-fib (HCC)    Chronic venous stasis dermatitis of both lower extremities    Symptomatic anemia    Family history of colon cancer    Family history of pancreatic cancer       PLAN:  57-year-old lady with history of atrial fibrillation history of coronary disease stent placed 20 years ago on Eliquis and Plavix 75 baseline hemoglobin was 10 2 weeks ago admitted with gradual increase of fatigue dyspnea exertion sleepy most part of the day no fever no vomiting blood no blood in the stool no dark stools no diarrhea hemoglobin ER was 4.4 suspected GI bleed Eliquis and Plavix held n.p.o. for now upper and lower endoscopies GI consulted  IV Venofer 200 mg daily  1 unit of PRBC given hemoglobin improved to 6 will need another unit of PRBC  Ckd,nephro consult,pt was due to see dr Linda Yoon for ckd  March 3  dyspne on rest  On fluids  cxr   bnp  Hx of chf  On bowel prep for egd colon  Iv venofer  Hg stable at 7  Dc fluids      Loyda Tavares MD, MD  [facility] [address], [address] [place] [address].    Phone [phone]   Fax: [phone]  Answering Service: [phone]

## 2022-03-03 NOTE — FLOWSHEET NOTE
03/02/22 2338   Treatment Team Notification   Reason for Communication Review case   Team Member Name Dr. Aye Machuca Provider   Method of Communication Page   Response Waiting for response   Notification Time [id number]     Numeric voicemail left for Physician communication. Still awaiting response. Physican called for hemoglobin update; patient's HGB at 7.4 after 1 unit of PRBC's. Patient does not have additional H&H labs ordered, but does have a morning CBC ordered. This RN called to clarify if additional H&H draws are needed. Will repage in morning. Electronically signed by Teodoro Luz RN.

## 2022-03-03 NOTE — PROGRESS NOTES
air movement, no respiratory distress  Cardiovascular: normal rate, regular rhythm, normal S1 and S2, no murmurs, rubs, clicks or gallops, distal pulses intact, no carotid bruits  Abdomen: soft, non-tender, obese  non-distended, normal bowel sounds, no masses or organomegaly  Extremities: no cyanosis, clubbing or edema  Musculoskeletal: normal range of motion, no joint swelling, deformity or tenderness  Neurologic: no cranial nerve deficit and muscle strength normal    Lab and Imaging Review     CBC  Recent Labs     03/01/22 2207 03/01/22 2207 03/02/22 0521 03/02/22 2157 03/03/22  0444   WBC 10.2  --  10.9  --  11.5*   HGB 4.4*   < > 6.2* 7.4* 7.4*   HCT 14.1*   < > 19.4* 21.9* 22.4*   MCV 90.6  --  90.1  --  88.9     --  195  --  199    < > = values in this interval not displayed. BMP  Recent Labs     03/01/22 2207 03/02/22 0521 03/03/22  0444    137 138   K 3.9 3.8 4.0    104 106   CO2 19* 19* 22   * 104* 92*   CREATININE 2.11* 1.86* 1.80*   GLUCOSE 178* 139* 103*   CALCIUM 8.5* 8.4* 8.3*       ANEMIA STUDIES  Recent Labs     03/01/22 2207 03/02/22  0521   LABIRON 5*  --    TIBC 419  --    FERRITIN 13  --    [id number]  --  729   FOLATE  --  >20.0     US LIVER     Result Date: 3/2/2022  EXAMINATION: RIGHT UPPER QUADRANT ULTRASOUND 3/2/2022 1:45 pm  FINDINGS: LIVER:  Diffuse increased liver parenchymal echogenicity is nonspecific but can be seen in patients with hepatic steatosis or diffuse hepatocellular process. .  No focal liver lesion. No intrahepatic biliary ductal dilatation. The portal vein is patent and demonstrates biphasic flow which can be seen in patients with tricuspid regurgitation. BILIARY SYSTEM:  Gallbladder is unremarkable without evidence of pericholecystic fluid, wall thickening or stones. Negative sonographic Graf's sign. Common bile duct is within normal limits measuring 4 mm.  RIGHT KIDNEY: Diffuse increased renal parenchymal echogenicity suggestive chronic medical renal.  No right hydronephrosis PANCREAS:  Visualized portions of the pancreas are unremarkable. OTHER: No evidence of right upper quadrant ascites.      Diffuse increased heterogeneous liver parenchymal echogenicity could be seen in patients with hepatic steatosis or diffuse hepatocellular process. Patent portal vein with biphasic flow can be seen in patient with tricuspid regurgitation. RECOMMENDATIONS: Unavailable     ASSESSMENT/plan  1. Iron deficiency anemia  -clear diet today and will plan for egd and colonoscopy tomorrow  -iron replacement  -trend hh  -ppi  -hold ac    2. Elevated lft's  Us showing possible steatosis vs hepatocellular process  -alk isoenzyme ordered  Recheck in am and if further elevated will initiate liver w/u    This plan was formulated in collaboration with Dr. Dedrick Harmon  .     Electronically signed by: Emi Soulier, APRN - CNP on 3/3/2022 at 7:35 AM

## 2022-03-04 ENCOUNTER — APPOINTMENT (OUTPATIENT)
Dept: GENERAL RADIOLOGY | Age: 72
DRG: 377 | End: 2022-03-04
Payer: OTHER GOVERNMENT

## 2022-03-04 ENCOUNTER — APPOINTMENT (OUTPATIENT)
Dept: CT IMAGING | Age: 72
DRG: 377 | End: 2022-03-04
Payer: OTHER GOVERNMENT

## 2022-03-04 ENCOUNTER — ANESTHESIA (OUTPATIENT)
Dept: ICU | Age: 72
DRG: 377 | End: 2022-03-04
Payer: OTHER GOVERNMENT

## 2022-03-04 ENCOUNTER — ANESTHESIA EVENT (OUTPATIENT)
Dept: ICU | Age: 72
DRG: 377 | End: 2022-03-04
Payer: OTHER GOVERNMENT

## 2022-03-04 LAB
ALLEN TEST: ABNORMAL
ANION GAP SERPL CALCULATED.3IONS-SCNC: 13 MMOL/L (ref 9–17)
BUN BLDV-MCNC: 71 MG/DL (ref 8–23)
CALCIUM SERPL-MCNC: 8.2 MG/DL (ref 8.6–10.4)
CARBOXYHEMOGLOBIN: 1 % (ref 0–5)
CARBOXYHEMOGLOBIN: 1.2 %
CARBOXYHEMOGLOBIN: 1.3 % (ref 0–5)
CHLORIDE BLD-SCNC: 106 MMOL/L (ref 98–107)
CO2: 20 MMOL/L (ref 20–31)
CREAT SERPL-MCNC: 1.73 MG/DL (ref 0.5–0.9)
FIO2: 30
GFR AFRICAN AMERICAN: 35 ML/MIN
GFR NON-AFRICAN AMERICAN: 29 ML/MIN
GFR SERPL CREATININE-BSD FRML MDRD: ABNORMAL ML/MIN/{1.73_M2}
GLUCOSE BLD-MCNC: 83 MG/DL (ref 65–105)
GLUCOSE BLD-MCNC: 87 MG/DL (ref 65–105)
GLUCOSE BLD-MCNC: 93 MG/DL (ref 70–99)
HCO3 ARTERIAL: 22.2 MMOL/L (ref 22–26)
HCO3 ARTERIAL: 22.8 MMOL/L (ref 22–26)
HCO3 ARTERIAL: 23.3 MMOL/L
HCT VFR BLD CALC: 21.4 % (ref 36–46)
HCT VFR BLD CALC: 23.6 % (ref 36–46)
HCT VFR BLD CALC: 24 % (ref 36–46)
HCT VFR BLD CALC: 24.4 % (ref 36–46)
HEMOGLOBIN: 6.9 G/DL (ref 12–16)
HEMOGLOBIN: 7.6 G/DL (ref 12–16)
HEMOGLOBIN: 7.7 G/DL (ref 12–16)
HEMOGLOBIN: 7.9 G/DL (ref 12–16)
LACTIC ACID: 0.7 MMOL/L (ref 0.5–2.2)
MCH RBC QN AUTO: 29.5 PG (ref 26–34)
MCHC RBC AUTO-ENTMCNC: 32.5 G/DL (ref 31–37)
MCV RBC AUTO: 90.7 FL (ref 80–100)
METHEMOGLOBIN: 0.7 %
METHEMOGLOBIN: 0.8 % (ref 0–1.9)
METHEMOGLOBIN: 1.2 % (ref 0–1.9)
NEGATIVE BASE EXCESS, ART: 3.8 MMOL/L (ref 0–2)
NEGATIVE BASE EXCESS, ART: 4.5 MMOL/L (ref 0–2)
NEGATIVE BASE EXCESS, ART: 5 MMOL/L (ref 0–2)
O2 DEVICE/FLOW/%: ABNORMAL
O2 SAT, ARTERIAL: 88.5 %
O2 SAT, ARTERIAL: 92 % (ref 95–98)
O2 SAT, ARTERIAL: 93.1 % (ref 95–98)
PATIENT TEMP: 37
PATIENT TEMP: 37
PCO2 ARTERIAL: 50.3 MMHG (ref 35–45)
PCO2 ARTERIAL: 51.8 MMHG
PCO2 ARTERIAL: 52.4 MMHG (ref 35–45)
PDW BLD-RTO: 15.6 % (ref 11.5–14.9)
PH ARTERIAL: 7.25 (ref 7.35–7.45)
PH ARTERIAL: 7.25 (ref 7.35–7.45)
PH ARTERIAL: 7.26
PLATELET # BLD: 214 K/UL (ref 150–450)
PMV BLD AUTO: 8.7 FL (ref 6–12)
PO2 ARTERIAL: 58 MMHG
PO2 ARTERIAL: 69 MMHG (ref 80–100)
PO2 ARTERIAL: 72.4 MMHG (ref 80–100)
POTASSIUM SERPL-SCNC: 4.4 MMOL/L (ref 3.7–5.3)
PT. POSITION: ABNORMAL
RBC # BLD: 2.61 M/UL (ref 4–5.2)
RESPIRATORY RATE: 16
RESPIRATORY RATE: 20
SAMPLE SITE: ABNORMAL
SODIUM BLD-SCNC: 139 MMOL/L (ref 135–144)
TEXT FOR RESPIRATORY: ABNORMAL
WBC # BLD: 10.7 K/UL (ref 3.5–11)

## 2022-03-04 PROCEDURE — 6360000002 HC RX W HCPCS: Performed by: INTERNAL MEDICINE

## 2022-03-04 PROCEDURE — 99233 SBSQ HOSP IP/OBS HIGH 50: CPT | Performed by: INTERNAL MEDICINE

## 2022-03-04 PROCEDURE — 71045 X-RAY EXAM CHEST 1 VIEW: CPT

## 2022-03-04 PROCEDURE — 2000000000 HC ICU R&B

## 2022-03-04 PROCEDURE — 83605 ASSAY OF LACTIC ACID: CPT

## 2022-03-04 PROCEDURE — 70450 CT HEAD/BRAIN W/O DYE: CPT

## 2022-03-04 PROCEDURE — 94761 N-INVAS EAR/PLS OXIMETRY MLT: CPT

## 2022-03-04 PROCEDURE — 2700000000 HC OXYGEN THERAPY PER DAY

## 2022-03-04 PROCEDURE — C9113 INJ PANTOPRAZOLE SODIUM, VIA: HCPCS | Performed by: INTERNAL MEDICINE

## 2022-03-04 PROCEDURE — 36600 WITHDRAWAL OF ARTERIAL BLOOD: CPT

## 2022-03-04 PROCEDURE — 2580000003 HC RX 258: Performed by: INTERNAL MEDICINE

## 2022-03-04 PROCEDURE — 94660 CPAP INITIATION&MGMT: CPT

## 2022-03-04 PROCEDURE — 82805 BLOOD GASES W/O2 SATURATION: CPT

## 2022-03-04 PROCEDURE — 94640 AIRWAY INHALATION TREATMENT: CPT

## 2022-03-04 PROCEDURE — 6370000000 HC RX 637 (ALT 250 FOR IP): Performed by: INTERNAL MEDICINE

## 2022-03-04 PROCEDURE — 36415 COLL VENOUS BLD VENIPUNCTURE: CPT

## 2022-03-04 PROCEDURE — 85027 COMPLETE CBC AUTOMATED: CPT

## 2022-03-04 PROCEDURE — 85014 HEMATOCRIT: CPT

## 2022-03-04 PROCEDURE — 82947 ASSAY GLUCOSE BLOOD QUANT: CPT

## 2022-03-04 PROCEDURE — 99222 1ST HOSP IP/OBS MODERATE 55: CPT | Performed by: PSYCHIATRY & NEUROLOGY

## 2022-03-04 PROCEDURE — 2500000003 HC RX 250 WO HCPCS: Performed by: INTERNAL MEDICINE

## 2022-03-04 PROCEDURE — 80048 BASIC METABOLIC PNL TOTAL CA: CPT

## 2022-03-04 PROCEDURE — 85018 HEMOGLOBIN: CPT

## 2022-03-04 RX ORDER — IPRATROPIUM BROMIDE AND ALBUTEROL SULFATE 2.5; .5 MG/3ML; MG/3ML
1 SOLUTION RESPIRATORY (INHALATION) EVERY 4 HOURS
Status: DISCONTINUED | OUTPATIENT
Start: 2022-03-04 | End: 2022-03-17 | Stop reason: HOSPADM

## 2022-03-04 RX ORDER — DEXMEDETOMIDINE HYDROCHLORIDE 4 UG/ML
.1-1.5 INJECTION, SOLUTION INTRAVENOUS CONTINUOUS
Status: DISCONTINUED | OUTPATIENT
Start: 2022-03-04 | End: 2022-03-05

## 2022-03-04 RX ADMIN — PANTOPRAZOLE SODIUM 40 MG: 40 INJECTION, POWDER, FOR SOLUTION INTRAVENOUS at 12:39

## 2022-03-04 RX ADMIN — ANTI-FUNGAL POWDER MICONAZOLE NITRATE TALC FREE: 1.42 POWDER TOPICAL at 21:31

## 2022-03-04 RX ADMIN — SODIUM CHLORIDE, PRESERVATIVE FREE 10 ML: 5 INJECTION INTRAVENOUS at 21:30

## 2022-03-04 RX ADMIN — DEXMEDETOMIDINE HYDROCHLORIDE 0.4 MCG/KG/HR: 400 INJECTION INTRAVENOUS at 15:06

## 2022-03-04 RX ADMIN — SODIUM CHLORIDE, PRESERVATIVE FREE 10 ML: 5 INJECTION INTRAVENOUS at 09:48

## 2022-03-04 RX ADMIN — SODIUM CHLORIDE, PRESERVATIVE FREE 10 ML: 5 INJECTION INTRAVENOUS at 09:47

## 2022-03-04 RX ADMIN — IPRATROPIUM BROMIDE AND ALBUTEROL SULFATE 1 AMPULE: 2.5; .5 SOLUTION RESPIRATORY (INHALATION) at 20:01

## 2022-03-04 RX ADMIN — IRON SUCROSE 200 MG: 20 INJECTION, SOLUTION INTRAVENOUS at 12:59

## 2022-03-04 RX ADMIN — ANTI-FUNGAL POWDER MICONAZOLE NITRATE TALC FREE: 1.42 POWDER TOPICAL at 12:39

## 2022-03-04 RX ADMIN — CHLORHEXIDINE GLUCONATE: 213 SOLUTION TOPICAL at 08:36

## 2022-03-04 ASSESSMENT — PAIN SCALES - GENERAL
PAINLEVEL_OUTOF10: 2
PAINLEVEL_OUTOF10: 0

## 2022-03-04 ASSESSMENT — PAIN - FUNCTIONAL ASSESSMENT: PAIN_FUNCTIONAL_ASSESSMENT: FACES

## 2022-03-04 NOTE — PROGRESS NOTES
Physical Therapy        Physical Therapy Cancel Note      DATE: 3/4/2022    NAME: Ravi Doctor  MRN: [id number]   : 1950      Patient not seen this date for Physical Therapy due to:     Other: RN holding tx this date d/t pt being combative      Electronically signed by Truman Newell PTA on 3/4/2022 at 3:25 PM

## 2022-03-04 NOTE — CARE COORDINATION
ONGOING DISCHARGE PLAN:    Patient is alert and oriented x4. Spoke with patient regarding discharge plan and patient confirms that plan is still to go home with no VNS. Patients daughter who are at bedside advised that their mother does not have coverage for that. They don't know how to go about getting additional coverage for their mother. Patient doesn't want anyone in her home. Very difficult to get [employer] to follow. New neuro and Pulmonary consults today    EGD and Colonoscopy was to be completed today and has had the bowel prep for it, but surgery felt patient was too lethargic for procedure and was cancelled today. Patient is hard of hearing. Remains on IV fluids and back on a regular diet. Will see if EGD/Colon canbe done as outpatient. Will continue to follow for additional discharge needs.     Electronically signed by Felicity Lucero RN on 3/4/2022 at 1:40 PM

## 2022-03-04 NOTE — CONSULTS
[facility], [address], [address] [place] [address]  Ph: [phone] or [phone]  FAX: [phone]        Reason for consult: Encephalopathy     I had the pleasure of seeing your patient in neurology consultation for her symptoms. As you would recall Barbara Root is a 66 y.o. yo female admitted on 3/1/2022. The patient initially presented to the hospital with diffuse fatigue. She has a past medical history of atrial fibrillation on Eliquis and Plavix 75 mg a day. The patient was found to have mild anemia. Initial hemoglobin was 4.4 and there is suspicion for GI bleed. Eliquis and Plavix 75 mg a day were held. The patient received 1 unit of packed RBCs. Nephrology was consulted for JOY superimposed on CKD. GI was consulted. No evidence of gross bleeding was seen. Patient was noted to be lethargic she was noted to be delirious. Stat CT head was obtained and neurology has been consulted.   Past Medical History:   Diagnosis Date    Acute CVA (cerebrovascular accident) ([address] 7/25/2020    Altered mental status 5/26/2021    Arthritis     Brain mass     Cellulitis and abscess     Cellulitis and abscess of unspecified site     Cellulitis of both lower extremities 5/26/2021    Cellulitis of left lower extremity 7/26/2020    Cellulitis of lower extremity 10/4/2020    CHF (congestive heart failure) (HCC)     Chronic kidney disease, unspecified     Coronary atherosclerosis of native coronary artery     Essential hypertension 7/25/2020    Essential hypertension, malignant     Hallucinations 2/18/2021    Hard of hearing     Head contusion 9/9/2020    Hypokalemia 9/9/2020    Iron deficiency anemia, unspecified     Meningioma ([address] 2/25/2021    Myocardial infarction ([address]     Other and unspecified hyperlipidemia     Pure hypercholesterolemia     Toxic metabolic encephalopathy 5/57/3787    Type II or unspecified type diabetes mellitus without mention of complication, not stated as uncontrolled     Unspecified asthma(493.90)     Unspecified venous (peripheral) insufficiency     Unspecified vitamin D deficiency     UTI (urinary tract infection) 2021     Past Surgical History:   Procedure Laterality Date    CORONARY ARTERY BYPASS GRAFT      x 3, Dr. Aissatou Harris History     Socioeconomic History    Marital status:      Spouse name: Not on file    Number of children: Not on file    Years of education: Not on file    Highest education level: Not on file   Occupational History    Not on file   Tobacco Use    Smoking status: Former Smoker     Packs/day: 0.25     Years: 10.00     Pack years: 2.50     Quit date: 2000     Years since quittin.1    Smokeless tobacco: Never Used   Vaping Use    Vaping Use: Never used   Substance and Sexual Activity    Alcohol use: Not Currently     Alcohol/week: 0.0 standard drinks    Drug use: No    Sexual activity: Not on file   Other Topics Concern    Not on file   Social History Narrative    Not on file     Social Determinants of Health     Financial Resource Strain: Low Risk     Difficulty of Paying Living Expenses: Not very hard   Food Insecurity: No Food Insecurity    Worried About Running Out of Food in the Last Year: Never true    Holden of Food in the Last Year: Never true   Transportation Needs:     Lack of Transportation (Medical): Not on file    Lack of Transportation (Non-Medical):  Not on file   Physical Activity:     Days of Exercise per Week: Not on file    Minutes of Exercise per Session: Not on file   Stress:     Feeling of Stress : Not on file   Social Connections:     Frequency of Communication with Friends and Family: Not on file    Frequency of Social Gatherings with Friends and Family: Not on file    Attends Quaker Services: Not on file    Active Member of Clubs or Organizations: Not on file    Attends Club or Organization Meetings: Not on file    Marital Status: Not on file   Intimate Partner Violence:     Fear of Current or Ex-Partner: Not on file    Emotionally Abused: Not on file    Physically Abused: Not on file    Sexually Abused: Not on file   Housing Stability:     Unable to Pay for Housing in the Last Year: Not on file    Number of Places Lived in the Last Year: Not on file    Unstable Housing in the Last Year: Not on file     Family History   Problem Relation Age of Onset    Diabetes Mother     Heart Disease Mother     Heart Disease Father     Diabetes Sister     High Blood Pressure Sister     Diabetes Maternal Grandmother       Allergies   Allergen Reactions    Latex Rash    Aleve [Naproxen Sodium]      Chronic kidney disease stage III, CHF    Pioglitazone Other (See Comments)     Congestive heart failure    Claritin [Loratadine]     Keflex [Cephalexin]     Lisinopril      Needs clarification of contraindication      BP (!) 137/57   Pulse 89   Temp 97.8 °F (36.6 °C) (Axillary)   Resp 16   Ht 4' 11\" (1.499 m)   Wt 165 lb 6.4 oz (75 kg) Comment: bedscale weight  SpO2 97%   BMI 33.41 kg/m²    ROS:11 point negative     Neurological examination:    Mental status   The patient apparently. Does not open eyes to noxious stimulation. Cranial nerves   Pupils are equal, midline face is symmetric. Motor function   weak flexion withdrawal in upper and lower extremities   Sensory function  unable to assess   Cerebellar  no visible tremors   Reflex function Intact 2+ DTR and symmetric.  Negative Babinski     Gait                  Not tested         Lab Results   Component Value Date    LDLCHOLESTEROL 50 12/06/2021     No components found for: CHLPL  Lab Results   Component Value Date    TRIG 132 12/06/2021    TRIG 112 07/27/2020    TRIG 279 (H) 03/11/2020     Lab Results   Component Value Date    HDL 59 12/06/2021    HDL 48 07/27/2020    HDL 55 03/11/2020     No results found for: LDLCALC  No results found for: LABVLDL  Lab Results   Component Value Date    LABA1C 5.8 11/24/2021     Lab Results   Component Value Date     10/27/2020     Lab Results   Component Value Date    [id number] 962 03/02/2022      Neurological work up:  CT head 3/4/2022 with diffuse cortical atrophy  CTA head and neck  MRI brain 2/18/2021 with right middle cranial fossa meningioma 2.3 cm  2 D echo     Assessment and Recommendations:   Encephalopathy, likely toxic metabolic secondary to hospital induced delirium, hypercarbia  Right middle cranial fossa meningioma 2.3 cm, 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 I will reevaluate the patient tomorrow. If mental status not improve, will get MRI scan of the brain and EEG  We will follow. Thank you for the consult. Júnior Puentes MD  Neurology    This note is created with the assistance of a speech-recognition program. While intending to generate a document that actually reflects the content of the visit, the document can still have some errors including those of syntax and sound a- like substitutions which may escape proofreading. In such instances, actual meaning can be extrapolated by contextual derivation.

## 2022-03-04 NOTE — PROGRESS NOTES
[address]   OCCUPATIONAL THERAPY MISSED TREATMENT NOTE   INPATIENT   Date: 3/4/22  Patient Name: Ravi Doctor       Room:   MRN: [id number]   Account #: [de-identified]    : 1950  (72 y.o.)  Gender: female                 REASON FOR MISSED TREATMENT:  Hold treatment per nursing request   -   Hold OT evaluation per SHERITA Aguayo. Pt being taken off the floor for colonoscopy and EGD at 0900. OT will re-attempt in afternoon as able. [id number]    PM (1436): Hold- pt transferred to ICU per staff.      Jeff Taylor, OT

## 2022-03-04 NOTE — PROGRESS NOTES
Pt brought down to pre op via stretcher with oxygen on at 3lpm per nc. Daughters at bedside. Pt is not able to be aroused even with sternal rub. Daniel Mina and Reza Dumont called to bedside. Pt connected to cardiac monitor. Vital signs obtained 86-/72 spO2 100%, accu chek obtained 87. EKG obtained results given to Dr Annmarie Mina.

## 2022-03-04 NOTE — PLAN OF CARE
Problem: Falls - Risk of:  Goal: Will remain free from falls  Description: Will remain free from falls  Outcome: Met This Shift  Goal: Absence of physical injury  Description: Absence of physical injury  Outcome: Met This Shift     Problem: Skin Integrity:  Goal: Will show no infection signs and symptoms  Description: Will show no infection signs and symptoms  Outcome: Ongoing  Goal: Absence of new skin breakdown  Description: Absence of new skin breakdown  Outcome: Ongoing     Problem: Nutrition  Goal: Optimal nutrition therapy  Outcome: Ongoing

## 2022-03-04 NOTE — PROGRESS NOTES
Interval Follow-Up Note     Subjective:  Main problem iron deficiency anemia. No gross bleeding. She is lethargic this morning. OBJECTIVE:   BP (!) 137/57   Pulse 89   Temp 98.7 °F (37.1 °C) (Infrared)   Resp 22   Ht 4' 11\" (1.499 m)   Wt 165 lb 6.4 oz (75 kg) Comment: bedscale weight  SpO2 100%   BMI 33.41 kg/m²   Body mass index is 33.41 kg/m². GEN: Lethargic  HEENT: Conjunctivae clear. Eyelids normal. No lymphadenopathy. No thyroid mass. CV: s1s2, RRR, no rubs. PULM: No accessory muscle use. Normal breath sounds bilaterally. ABD/GI: Soft NT ND +BS  EXT: No edema or rash. Warm skin. No joint swelling. Limited neuro exam intact.      Lab Results   Component Value Date    WBC 11.5 (H) 03/03/2022    HGB 7.9 (L) 03/04/2022    HCT 24.4 (L) 03/04/2022    MCV 88.9 03/03/2022     03/03/2022     03/03/2022    K 4.0 03/03/2022     03/03/2022    CO2 22 03/03/2022    BUN 92 (HH) 03/03/2022    CREATININE 1.80 (H) 03/03/2022    LABPROT 6.9 07/20/2012    LABALBU 3.3 (L) 02/06/2022    BILITOT 0.29 (L) 02/06/2022    ALKPHOS 230 (H) 02/06/2022    AST 32 (H) 02/06/2022    ALT 22 02/06/2022      Lab Results   Component Value Date    RBC 2.52 (L) 03/03/2022    HGB 7.9 (L) 03/04/2022    MCV 88.9 03/03/2022    MCH 29.2 03/03/2022    MCHC 32.9 03/03/2022    RDW 15.4 (H) 03/03/2022    MPV 9.0 03/03/2022    BASOPCT 0 03/01/2022    LYMPHSABS 0.71 (L) 03/01/2022    MONOSABS 0.51 03/01/2022    NEUTROABS 8.98 03/01/2022    EOSABS 0.00 03/01/2022    BASOSABS 0.00 03/01/2022        Past Medical History:   Diagnosis Date    Acute CVA (cerebrovascular accident) ([facility] 7/25/2020    Altered mental status 5/26/2021    Arthritis     Brain mass     Cellulitis and abscess     Cellulitis and abscess of unspecified site     Cellulitis of both lower extremities 5/26/2021    Cellulitis of left lower extremity 7/26/2020    Cellulitis of lower extremity 10/4/2020    CHF (congestive heart failure) ([facility]     Chronic kidney disease, unspecified     Coronary atherosclerosis of native coronary artery     Essential hypertension 7/25/2020    Essential hypertension, malignant     Hallucinations 2/18/2021    Hard of hearing     Head contusion 9/9/2020    Hypokalemia 9/9/2020    Iron deficiency anemia, unspecified     Meningioma ([facility] 2/25/2021    Myocardial infarction ([facility]     Other and unspecified hyperlipidemia     Pure hypercholesterolemia     Toxic metabolic encephalopathy 9/50/7152    Type II or unspecified type diabetes mellitus without mention of complication, not stated as uncontrolled     Unspecified asthma(493.90)     Unspecified venous (peripheral) insufficiency     Unspecified vitamin D deficiency     UTI (urinary tract infection) 2/18/2021          Current Facility-Administered Medications:     [MAR Hold] sodium chloride flush 0.9 % injection 5-40 mL, 5-40 mL, IntraVENous, 2 times per day, MAL Singh CNP, 10 mL at 03/03/22 1327    [MAR Hold] sodium chloride flush 0.9 % injection 10 mL, 10 mL, IntraVENous, PRN, MAL Singh - CNP    [facility] Hold] 0.9 % sodium chloride infusion, 25 mL, IntraVENous, PRN, MAL Singh - CNP    [MAR Hold] ondansetron (ZOFRAN-ODT) disintegrating tablet 4 mg, 4 mg, Oral, Q8H PRN **OR** [MAR Hold] ondansetron (ZOFRAN) injection 4 mg, 4 mg, IntraVENous, Q6H PRN, MAL Singh - CNP    [facility] Hold] magnesium hydroxide (MILK OF MAGNESIA) 400 MG/5ML suspension 30 mL, 30 mL, Oral, Daily PRN, MAL Singh CNP    [MAR Hold] acetaminophen (TYLENOL) tablet 650 mg, 650 mg, Oral, Q6H PRN, 650 mg at 03/03/22 1326 **OR** [MAR Hold] acetaminophen (TYLENOL) suppository 650 mg, 650 mg, Rectal, Q6H PRN, MAL Singh - CNP    [facility] Hold] 0.9 % sodium chloride infusion, , IntraVENous, Continuous, MAL Singh CNP, Stopped at 03/04/22 0910    [MAR Hold] albuterol (PROVENTIL) nebulizer solution 2.5 mg, 2.5 mg, Nebulization, Q6H PRN, Gloria Franks Pennie Ferrer, APRN - CNP, 2.5 mg at 03/03/22 1200    [MAR Hold] allopurinol (ZYLOPRIM) tablet 300 mg, 300 mg, Oral, Daily, MAL Cano CNP, 300 mg at 03/03/22 0953    [MAR Hold] amLODIPine (NORVASC) tablet 10 mg, 10 mg, Oral, Daily, MAL Cano CNP, 10 mg at 03/03/22 0954    [MAR Hold] atorvastatin (LIPITOR) tablet 40 mg, 40 mg, Oral, Daily, MAL Cano CNP, 40 mg at 03/03/22 0954    [Held by provider] bumetanide (BUMEX) tablet 1 mg, 1 mg, Oral, BID, MAL Cano CNP    [facility] Hold] chlorhexidine (HIBICLENS) 4 % liquid, , Topical, Daily, MAL Cano CNP    [Held by provider] clopidogrel (PLAVIX) tablet 75 mg, 75 mg, Oral, Daily, MAL Cano CNP    [facility] Hold] magnesium oxide (MAG-OX) tablet 400 mg, 400 mg, Oral, BID, MAL Cano CNP, 400 mg at 03/03/22 2131    [MAR Hold] metoprolol tartrate (LOPRESSOR) tablet 50 mg, 50 mg, Oral, BID, MAL Cano CNP, 50 mg at 03/03/22 2131    [MAR Hold] miconazole (MICOTIN) 2 % powder, , Topical, BID, MAL Cano CNP, Given at 03/03/22 2131    [MAR Hold] therapeutic multivitamin-minerals 1 tablet, 1 tablet, Oral, Daily, MAL Cano CNP, 1 tablet at 03/03/22 0958    [MAR Hold] pantoprazole (PROTONIX) injection 40 mg, 40 mg, IntraVENous, Daily, 40 mg at 03/03/22 1000 **AND** [MAR Hold] sodium chloride flush 0.9 % injection 10 mL, 10 mL, IntraVENous, Daily, MAL Cano CNP, 10 mL at 03/03/22 1001    [MAR Hold] 0.9 % sodium chloride infusion, , IntraVENous, PRN, MAL Cano CNP    [facility] Hold] cetirizine HCl (ZYRTEC) 5 MG/5ML solution 5 mg, 5 mg, Oral, Daily, MAL Cano CNP, 5 mg at 03/03/22 0958    [MAR Hold] iron sucrose (VENOFER) 200 mg in sodium chloride 0.9 % 100 mL IVPB, 200 mg, IntraVENous, Q24H, Jhonny Wellington MD, Stopped at 03/03/22 1327     ECHO Complete 2D W Doppler W Color    Result Date: 2/7/2022  [address] [facility] Echocardiography Report (TTE)  Patient Name [id number]       Date of Study                 02/07/2022               Fco Bi   Date of      1950  Gender                        Female  Birth   Age          66 year(s)  Race                             Room Number  2090        Height:                       58.66 inch, 149 cm   Corporate ID [id number]    Weight:                       132 pounds, 60 kg  #   Patient Acct [de-identified]   BSA:           1.54 m^2       BMI:      27.03  #                                                                kg/m^2   MR #         [id number]      Alex Elizabeth   Accession #  [id number]  Interpreting Physician        Jane Pressley   Fellow                   Referring Nurse Practitioner   Interpreting             Referring Physician           Jann Duong  Fellow                                                 Mitzy Manuel  Type of Study   TTE procedure:2D Echocardiogram, M-Mode, Doppler, Color Doppler. Procedure Date Date: 02/07/2022 Start: 11:37 AM Study Location: [facility] Technical Quality: Fair visualization Indications:Congestive heart failure. History / Tech. Comments: CABG DM CKD COPD HTN AF Patient Status: Inpatient Height: 58.66 inches Weight: 132.3 pounds BSA: 1.54 m^2 BMI: 27.03 kg/m^2 Rhythm: Within normal limits Allergies   - Lisinopril. - Keflex. - *Unlisted:(Aleve,Claratin, Pioglitazone). CONCLUSIONS Summary Normal left ventricle size and function with an estimated EF > 55%. No segmental wall motion abnormalities seen. Moderate left ventricular hypertrophy. Normal right ventricular size and function. Left atrial dilatation. Aortic valve is trileaflet. Mild aortic stenosis. Mild-moderate aortic insufficiency. Normal aortic root dimension. Mitral annular calcification is seen. Mild mitral regurgitation. Mild tricuspid regurgitation. Normal right ventricular systolic pressure.  No significant pericardial effusion is seen. Pleural effusion present. Signature ----------------------------------------------------------------------------  Electronically signed by Saira Price(Sonographer) on 2022 12:16  PM ---------------------------------------------------------------------------- ----------------------------------------------------------------------------  Electronically signed by Samson Morales(Interpreting physician) on  2022 12:19 PM ---------------------------------------------------------------------------- FINDINGS Left Atrium Left atrial dilatation. Left Ventricle Normal left ventricle size and function with an estimated EF > 55%. No segmental wall motion abnormalities seen. Moderate left ventricular hypertrophy. Right Atrium Right atrial dilatation. Right Ventricle Normal right ventricular size and function. Mitral Valve Mitral annular calcification is seen. Mild mitral regurgitation. Aortic Valve Aortic valve is trileaflet. Mild aortic stenosis. Mild-moderate aortic insufficiency. Tricuspid Valve Normal tricuspid valve structure and function. Mild tricuspid regurgitation. Normal right ventricular systolic pressure. Pulmonic Valve The pulmonic valve is normal in structure. Mild pulmonic insufficiency. Pericardial Effusion No significant pericardial effusion is seen. Pleural Effusion Pleural effusion present. Miscellaneous Normal aortic root dimension. E/e' average 16.5 IVC normal diameter & inspiratory collapse indicating normal RA filling pressure .  M-mode / 2D Measurements & Calculations:   LVIDd:4 cm(3.7 - 5.6 cm)         Diastolic BCRRUU:89 ml  ABTPO:7.55 cm(2.2 - 4.0 cm)      Systolic TSFLZQ:67.4 ml  CNHD:6.56 cm(0.6 - 1.1 cm)       Aortic Root:2.9 cm(2.0 - 3.7 cm)  LVPWd:1.53 cm(0.6 - 1.1 cm)      LA Dimension: 5.4 cm(1.9 - 4.0 cm)  Fractional Shortenin %       LA volume/Index: 56.5 ml /37m^2  Calculated LVEF (%): 73.75 %     LVOT:2.2 cm   Mitral:                                 Aortic   Valve Area (P1/2-Time): 2.68 cm^2       Peak Velocity: 2.09 m/s  Peak E-Wave: 1.24 m/s                   Mean Velocity: 1.34 m/s  Peak A-Wave: 0.92 m/s                   Peak Gradient: 17.47 mmHg  E/A Ratio: 1.35                         Mean Gradient: 9 mmHg  Peak Gradient: 6.15 mmHg                AI P1/2t: 325 msec  Mean Gradient: 3 mmHg  Deceleration Time: 169 msec             Area (continuity): 1.84 cm^2  P1/2t: 82 msec                          AV VTI: 51.2 cm   Area (continuity): 2.32 cm^2  Mean Velocity: 0.85 m/s   Tricuspid:   Peak TR Velocity: 3.03 m/s  Peak TR Gradient: 36.7236 mmHg  Septal Wall E' velocity:0.07 m/s Lateral Wall E' velocity:0.09 m/s    XR CHEST (2 VW)    Result Date: 3/3/2022  EXAMINATION: TWO XRAY VIEWS OF THE CHEST 3/3/2022 4:40 pm COMPARISON: Chest 03/01/2022 HISTORY: Cough, CHF FINDINGS: The cardiac silhouette is enlarged. Calcifications involving the aorta reflect atherosclerosis. The mediastinal and hilar silhouettes appear unremarkable. Chronic appearing coarse interstitial densities predominate perihilar regions and lung bases, typical of sequela from smoking or other previous infectious/inflammatory process. The lungs are hyperinflated with increased translucency both lung apices and tapering of the vasculature in the upper lungs. Vascular engorgement and cephalization is demonstrated with bilateral peribronchial cuffing and perivascular haziness. Small volume bilateral pleural effusion evident with bibasilar opacities. No pneumothorax is seen. Bone mineralization appears abnormally low. Hyperkyphosis thoracic spine. No acute osseous abnormality is identified. Stable sequela from open-heart surgery. 1.  Congestive heart failure is most likely given the radiographic findings; pneumonia is also a consideration in areas of consolidation with pleural effusion.  2.  Chronic appearing coarse interstitial densities predominate perihilar regions and lung bases, typical of sequela from smoking or other previous infectious/inflammatory process. 3. Calcific atherosclerosis aorta. 4. Cardiomegaly. US LIVER    Result Date: 3/2/2022  EXAMINATION: RIGHT UPPER QUADRANT ULTRASOUND 3/2/2022 1:45 pm COMPARISON: CT abdomen and pelvis May 25, 2021 HISTORY: ORDERING SYSTEM PROVIDED HISTORY: elevated lft TECHNOLOGIST PROVIDED HISTORY: elevated lft FINDINGS: LIVER:  Diffuse increased liver parenchymal echogenicity is nonspecific but can be seen in patients with hepatic steatosis or diffuse hepatocellular process. .  No focal liver lesion. No intrahepatic biliary ductal dilatation. The portal vein is patent and demonstrates biphasic flow which can be seen in patients with tricuspid regurgitation. BILIARY SYSTEM:  Gallbladder is unremarkable without evidence of pericholecystic fluid, wall thickening or stones. Negative sonographic Graf's sign. Common bile duct is within normal limits measuring 4 mm. RIGHT KIDNEY: Diffuse increased renal parenchymal echogenicity suggestive chronic medical renal.  No right hydronephrosis PANCREAS:  Visualized portions of the pancreas are unremarkable. OTHER: No evidence of right upper quadrant ascites. Diffuse increased heterogeneous liver parenchymal echogenicity could be seen in patients with hepatic steatosis or diffuse hepatocellular process. Patent portal vein with biphasic flow can be seen in patient with tricuspid regurgitation. RECOMMENDATIONS: Unavailable     XR CHEST PORTABLE    Result Date: 3/1/2022  EXAMINATION: ONE XRAY VIEW OF THE CHEST 3/1/2022 7:43 pm COMPARISON: February 6, 2022 HISTORY: ORDERING SYSTEM PROVIDED HISTORY: fatigue TECHNOLOGIST PROVIDED HISTORY: fatigue Reason for Exam: Fatigue, shortness of breath Additional signs and symptoms: Fatigue, shortness of breath Relevant Medical/Surgical History: Fatigue, shortness of breath FINDINGS: Marginal inspiration is present. Cardiomegaly is present. Vascular markings are engorged.   Small pleural effusions are noted. No pneumothorax is present. Patient is status post prior sternotomy. No acute osseous abnormality is present. 1. Cardiomegaly, mild vascular congestion, improved since the prior study 2. Small bilateral pleural effusions     XR CHEST PORTABLE    Result Date: 2/6/2022  EXAMINATION: ONE XRAY VIEW OF THE CHEST 2/6/2022 10:03 am COMPARISON: 05/25/2021 HISTORY: Reason for Exam: sob FINDINGS: Mild cardiomegaly. Generalized interstitial prominence suggesting vascular congestion. Mild bilateral pleural effusions with associated atelectasis. No pneumothorax. Stable postsurgical changes of CABG. Findings suggesting likely CHF as described. Intake/Output Summary (Last 24 hours) at 3/4/2022 1045  Last data filed at 3/3/2022 1536  Gross per 24 hour   Intake 240 ml   Output 250 ml   Net -10 ml        IMPRESSION / RECOMMENDATIONS: Ms. Tami Banks is a 66 y.o. female followed for management of iron deficiency anemia. No gross bleeding. She is lethargic today. We obtained ABGs. No reports of sedative use overnight. We notified primary team.  She may need PET CT scan for further evaluation.        MD Jayleen Martines

## 2022-03-04 NOTE — PROGRESS NOTES
[address] [employer]    Date:   3/4/2022  Patient name:  Ravi Clarke  Date of admission:  3/1/2022  9:47 PM  MRN:   [id number]  YOB: 1950      HPI        Overnight no fever chills loss of appetite positive for dyspnea on rest on nasal cannula  REVIEW OF SYSTEMS:    · Cardiovascular: Negative for lightheadedness/orthostatic symptoms ,chest pain, dyspnea on exertion, palpitations or loss of consciousness. · Respiratory: Positive for dyspnea on rest  · Gastrointestinal: Negative for nausea/vomiting, change in bowel habits, abdominal pain, dysphagia/appetite loss, hematemesis, blood in stools. OBJECTIVE:    BP (!) 137/57   Pulse 89   Temp 98.7 °F (37.1 °C) (Infrared)   Resp 22   Ht 4' 11\" (1.499 m)   Wt 165 lb 6.4 oz (75 kg) Comment: bedscale weight  SpO2 100%   BMI 33.41 kg/m²    On nasal cannula oxygen look dyspneic  · Lungs: Bibasal crackles  · Heart: regular rate and rhythm, S1, S2 normal, no murmur, click, rub or gallop  · Abdomen: soft, non-tender; bowel sounds normal; no masses,  no organomegaly  · Extremities: extremities normal, atraumatic, no cyanosis or edema  · Neurological:  Reflexes normal and symmetric. Sensation grossly normal  · Skin - no rash, no lump   · Eye- no icterus no redness  · GI-oral mucosa moist,  · Lymphatic system-no lymphadenopathy no splenomegaly  · Mouth- mucous membrane moist  · Head-normocephalic atraumatic  · Neck- supple no carotid bruit,Thyroid not palpable.       Past Medical History:   has a past medical history of Acute CVA (cerebrovascular accident) ([address] Altered mental status, Arthritis, Brain mass, Cellulitis and abscess, Cellulitis and abscess of unspecified site, Cellulitis of both lower extremities, Cellulitis of left lower extremity, Cellulitis of lower extremity, CHF (congestive heart failure) ([address] Chronic kidney disease, unspecified, Coronary atherosclerosis of native coronary artery, Essential hypertension, Essential hypertension, malignant, Hallucinations, Hard of hearing, Head contusion, Hypokalemia, Iron deficiency anemia, unspecified, Meningioma ([facility] Myocardial infarction ([facility] Other and unspecified hyperlipidemia, Pure hypercholesterolemia, Toxic metabolic encephalopathy, Type II or unspecified type diabetes mellitus without mention of complication, not stated as uncontrolled, Unspecified asthma(493.90), Unspecified venous (peripheral) insufficiency, Unspecified vitamin D deficiency, and UTI (urinary tract infection). Past Surgical History:   has a past surgical history that includes Tonsillectomy and adenoidectomy and Coronary artery bypass graft. Home Medications:    Prior to Admission medications    Medication Sig Start Date End Date Taking?  Authorizing Provider   vitamin D (ERGOCALCIFEROL) 1.25 MG (21971 UT) CAPS capsule Take 1 capsule by mouth once a week  Patient taking differently: Take 50,000 Units by mouth once a week Sundays 3/1/22  Yes Rut Frost MD   albuterol sulfate HFA (PROAIR HFA) 108 (90 Base) MCG/ACT inhaler Inhale 2 puffs into the lungs every 6 hours as needed for Wheezing or Shortness of Breath (cough) 2/22/22  Yes Jostin Lara MD   apixaban (ELIQUIS) 5 MG TABS tablet Take 1 tablet by mouth 2 times daily 2/15/22  Yes Rut Frost MD   allopurinol (ZYLOPRIM) 300 MG tablet Take 1 tablet by mouth daily Per rheumatologist 2/15/22  Yes Rut Frost MD   amLODIPine (NORVASC) 10 MG tablet Take 1 tablet by mouth daily FOR GOUT 2/15/22  Yes Rut Frost MD   atorvastatin (LIPITOR) 40 MG tablet Take 1 tablet by mouth once daily 2/15/22  Yes Rut Frost MD   bumetanide (BUMEX) 1 MG tablet Take 1 tablet by mouth 2 times daily 2/15/22  Yes Rut Frost MD   chlorhexidine (HIBICLENS) 4 % external liquid for decontamination AND [address] LEGS EVERY DAY 2/15/22  Yes Rut Frost MD   clopidogrel (PLAVIX) 75 MG tablet Take 1 tablet by mouth daily 2/15/22  Yes Ortega Garcia MD   desloratadine (CLARINEX) 5 MG tablet Take 1 tablet by mouth once daily 2/15/22  Yes Ortega Garcia MD   magnesium oxide (MAG-OX) 400 (241.3 Mg) MG TABS tablet Take 1 tablet by mouth twice daily 2/15/22  Yes Ortega Garcia MD   metoprolol tartrate (LOPRESSOR) 50 MG tablet Take 1 tablet by mouth 2 times daily 2/15/22  Yes Ortega Garcia MD   miconazole (MICOTIN) 2 % powder Apply topically 2 times daily UNDER THE SKIN FOLDS LONG TERM 2/15/22  Yes Ortega Garcia MD   mupirocin (BACTROBAN) 2 % cream Apply 2 times dailY X 10 DAYS ON OPEN BLISTERS ON THE LEGS. 2/15/22 3/17/22 Yes Ortega Garcia MD   Multiple Vitamins-Minerals (THERAPEUTIC MULTIVITAMIN-MINERALS) tablet Take 1 tablet by mouth daily 2/15/22  Yes Ortega Garcia MD   colchicine (COLCRYS) 0.6 MG tablet Take 2 tablets now, then 1 tablet one hour later. Wait 12 hours then start 1 tablet bid for 5 days. Patient taking differently: Take 0.6 mg by mouth daily Take 2 tablets now, then 1 tablet one hour later. Wait 12 hours then start 1 tablet bid for 5 days. 2/15/22  Yes Ortega Garcia MD   Nebulizers (COMPRESSOR/NEBULIZER) MISC Nebulizer with compressor. Disposable Med Nebs 2 /month. Reusable Med Nebs 1 per 6 months. Aerosol Mask 1 per month. Replacement Filters 2 per month. All other related supplies as needed per month. Medications being used: Albuterol . 083 unit dose vial. Frequency: every 6 hrs x 4/day . Length of Need: 1 2/22/22   Osman Leos MD   albuterol (PROVENTIL) (2.5 MG/3ML) 0.083% nebulizer solution Take 3 mLs by nebulization every 6 hours as needed for Wheezing or Shortness of Breath 2/22/22   Osman Leos MD   Misc.  Devices (ADJUST FOLD CANE/YORK HANDLE) MISC Use daily for walking 2/22/22   Osman Leos MD   Handicap Placard MISC by Does not apply route Expires on 12/30/25 2/22/22   Osman Leos MD   FreeStyle Lancets MISC 1 each by Does not apply route daily Patient needs to contact office before any further refills will be approved 2/15/22   Isabel Robertson MD   Bismuth Tribromoph-Petrolatum (XEROFORM PETROLATUM PATCH 2\"X2\") PADS external pads Apply 1 each topically daily 12/10/21   MAL Moreau - CNP   blood glucose test strips (FREESTYLE LITE) strip 1 each by In Vitro route daily As needed. 3/19/21   Isabel Robertson MD   Blood Pressure KIT 1 kit by Does not apply route three times daily 12/19/19   Izabel Causey MD   blood glucose monitor kit and supplies 1 kit by Other route three times daily Dispense Butterfly Elite CBG Device 8/20/19   Alexander Fulton MD       Allergies:  Latex, Aleve [naproxen sodium], Pioglitazone, Claritin [loratadine], Keflex [cephalexin], and Lisinopril    Social History:   reports that she quit smoking about 22 years ago. She has a 2.50 pack-year smoking history. She has never used smokeless tobacco. She reports previous alcohol use. She reports that she does not use drugs. Family History: family history includes Diabetes in her maternal grandmother, mother, and sister; Heart Disease in her father and mother; High Blood Pressure in her sister. Laboratory Testing:  CBC:   Recent Labs     03/03/22  0444 03/03/22  1725 03/04/22  0148   WBC 11.5*  --   --    HGB 7.4*   < > 7.9*     --   --     < > = values in this interval not displayed. BMP:    Recent Labs     03/01/22 2207 03/01/22 2207 03/02/22  0521 03/02/22  0521 03/03/22 0444      < > 137   < > 138   K 3.9   < > 3.8   < > 4.0      < > 104   < > 106   CO2 19*   < > 19*   < > 22   *   < > 104*   < > 92*   CREATININE 2.11*  --  1.86*  --  1.80*   GLUCOSE 178*   < > 139*   < > 103*    < > = values in this interval not displayed. S. Calcium:  Recent Labs     03/03/22 0444   CALCIUM 8.3*     S. Ionized Calcium:No results for input(s): IONCA in the last 72 hours.   S. Magnesium:  Recent Labs 03/01/22  2207   MG 1.8     S. Phosphorus:No results for input(s): PHOS in the last 72 hours. S. Glucose:  Recent Labs     03/03/22  1543 03/03/22  1950/22  0928   POCGLU 100 107* 87     Glycosylated hemoglobin A1C: No results for input(s): LABA1C in the last 72 hours. INR: No results for input(s): INR in the last 72 hours. Hepatic functions: No results for input(s): ALKPHOS, ALT, AST, PROT, BILITOT, BILIDIR, LABALBU in the last 72 hours. Pancreatic functions:No results for input(s): LACTA, AMYLASE in the last 72 hours. S. Lactic Acid: No results for input(s): LACTA in the last 72 hours. Cardiac enzymes:No results for input(s): CKTOTAL, CKMB, CKMBINDEX, TROPONINI in the last 72 hours. BNP:No results for input(s): BNP in the last 72 hours. Lipid profile: No results for input(s): CHOL, TRIG, HDL, LDL, LDLCALC in the last 72 hours. Blood Gases: No results found for: PH, PCO2, PO2, HCO3, O2SAT  Thyroid functions:   Lab Results   Component Value Date    TSH 2.60 12/06/2021        Imaging/Diagonstics:  EKG: Normal sinus rhythm    CXR: No acute cardiopulmonary findings. ASSESSMENT:    Patient Active Problem List   Diagnosis    Vitamin D deficiency    Venous insufficiency of both lower extremities    Asthma    Type 2 diabetes mellitus with stage 3 chronic kidney disease, without long-term current use of insulin ([facility]    Coronary artery disease involving native coronary artery of native heart without angina pectoris    Iron deficiency anemia    Stage 3 chronic kidney disease ([facility]    Colonoscopy refused    Pneumococcal vaccination declined    Impaired hearing    Chronic diastolic CHF (congestive heart failure) ([facility]    COPD (chronic obstructive pulmonary disease) ([facility]    HTN.  Benign hypertension with CKD (chronic kidney disease) stage III (HCC)    Gout with tophi    Hyperlipidemia with target LDL less than 70    Dry skin dermatitis HANDS    Meningioma, cerebral (HCC)    Paroxysmal atrial fibrillation ([facility]    Influenza vaccination declined    Recurrent UTI    Elevated LFTs    Hypomagnesemia    Kidney stones    Diverticulosis    COVID-19 vaccination declined    Acute kidney injury ([facility]    Acute on chronic combined systolic (congestive) and diastolic (congestive) heart failure (HCC)    Chronic a-fib (HCC)    Chronic venous stasis dermatitis of both lower extremities    Symptomatic anemia    Family history of colon cancer    Family history of pancreatic cancer       PLAN:  69-year-old lady with history of atrial fibrillation history of coronary disease stent placed 20 years ago on Eliquis and Plavix 75 baseline hemoglobin was 10 2 weeks ago admitted with gradual increase of fatigue dyspnea exertion sleepy most part of the day no fever no vomiting blood no blood in the stool no dark stools no diarrhea hemoglobin ER was 4.4 suspected GI bleed Eliquis and Plavix held n.p.o. for now upper and lower endoscopies GI consulted  IV Venofer 200 mg daily  1 unit of PRBC given hemoglobin improved to 6 will need another unit of PRBC  Ckd,nephro consult,pt was due to see dr Jessenia Romeo for ckd  March 3  dyspne on rest  On fluids  cxr   bnp  Hx of chf  On bowel prep for egd colon  Iv venofer  Hg stable at 7  Dc fluids  March 4  Case discussed with gi on floor  Procedure abandoned due to  Pt delirious  Did not sleep all night due to bowel prep  No focal deficit  Family in room  Ct head stat non contrast  cxr  Neuro consult  poc 252   with mild acidosis resp  Will consult plum  May need bipap  No sedatives or pain meds  Off fluids  Acute metabolic encephalopathy due to co2 narcosis      Bebeto Desouza MD, MD  [address]    Phone [phone]   Fax: [phone]  Answering Service: [phone]

## 2022-03-04 NOTE — PROGRESS NOTES
Pulmonary/CCM follow-up note    Met with both sisters, they want mother to be full code. Additionally, they would accept dialysis if needed. I explained to them that we will see how BiPAP goes but if she does not get any better, she will need to be intubated.   I did adjust the BiPAP settings to 18/6 and increase back up rate to 16 we will get follow-up ABG

## 2022-03-04 NOTE — CONSULTS
[facility] PULMONARY & CRITICAL CARE SPECIALISTS   CONSULT NOTE:      DATE OF CONSULT 3/4/2022    REASON FOR CONSULTATION:  Pulmonary and critical care management      PCP Chelly Harrison MD     CHIEF COMPLAINT: Elevated CO2, change in mental state    HISTORY OF PRESENT ILLNESS:   Mrs. Kenya Schaefer is an obese 65-year-old white female we were asked to see because of change in mental status and increased CO2. She presented on March 1 with generalized fatigue and dyspnea. She was recently admitted and discharged in February of this year with congestive heart failure and leg swelling. She then complained of increased dyspnea and fatigue. She was found to be anemic. There is no obvious source of bleeding but the patient was on Eliquis twice a day for atrial fibrillation. Her hemoglobin went down to 4.4 and she got several units of blood. There was no obvious bleeding noted. She was felt to have iron deficiency anemia. Also had acute on chronic kidney disease and was given IV fluids. Previously, she was actually on Bumex. He has been getting normal saline at 75 mL/h. She was supposed to have an EGD and colonoscopy but she was found to be lethargic and confused. An arterial blood gas was obtained by the GI service, which showed a pH of 7.25 with a PCO2 of 50, PO2 of 72, and bicarb of 22. The endoscopy was canceled. We were then asked to see her. I was not notified of the blood gases. When we went into the room, her daughter was present at the bedside. Patient was lethargic but arousable and answers questions only yes or no and would not answer any questions. She is a full code. Her daughter says that they were supposed to discuss advanced directives but that was not done. She is a former smoker, approximately 2 packs/day for 30 years, quitting approximately 20 years ago.   She carries a diagnosis of COPD/asthma, but it is unclear how that diagnosis was Gonzlaokareen Lopez is never had pulmonary function test.  Her daughter says she snores, but she has never been formally tested for obstructive sleep apnea. ALLERGIES:  Allergies   Allergen Reactions    Latex Rash    Aleve [Naproxen Sodium]      Chronic kidney disease stage III, CHF    Pioglitazone Other (See Comments)     Congestive heart failure    Claritin [Loratadine]     Keflex [Cephalexin]     Lisinopril      Needs clarification of contraindication       HOME MEDICATIONS:  Medications Prior to Admission: vitamin D (ERGOCALCIFEROL) 1.25 MG (93433 UT) CAPS capsule, Take 1 capsule by mouth once a week (Patient taking differently: Take 50,000 Units by mouth once a week Sundays)  [] ciprofloxacin (CIPRO) 500 MG tablet, Take 1 tablet by mouth 2 times daily for 3 days  albuterol sulfate HFA (PROAIR HFA) 108 (90 Base) MCG/ACT inhaler, Inhale 2 puffs into the lungs every 6 hours as needed for Wheezing or Shortness of Breath (cough)  apixaban (ELIQUIS) 5 MG TABS tablet, Take 1 tablet by mouth 2 times daily  allopurinol (ZYLOPRIM) 300 MG tablet, Take 1 tablet by mouth daily Per rheumatologist  amLODIPine (NORVASC) 10 MG tablet, Take 1 tablet by mouth daily FOR GOUT  atorvastatin (LIPITOR) 40 MG tablet, Take 1 tablet by mouth once daily  bumetanide (BUMEX) 1 MG tablet, Take 1 tablet by mouth 2 times daily  chlorhexidine (HIBICLENS) 4 % external liquid, for decontamination AND WASH LEGS EVERY DAY  clopidogrel (PLAVIX) 75 MG tablet, Take 1 tablet by mouth daily  desloratadine (CLARINEX) 5 MG tablet, Take 1 tablet by mouth once daily  magnesium oxide (MAG-OX) 400 (241.3 Mg) MG TABS tablet, Take 1 tablet by mouth twice daily  metoprolol tartrate (LOPRESSOR) 50 MG tablet, Take 1 tablet by mouth 2 times daily  miconazole (MICOTIN) 2 % powder, Apply topically 2 times daily UNDER THE SKIN FOLDS LONG TERM  mupirocin (BACTROBAN) 2 % cream, Apply 2 times dailY X 10 DAYS ON OPEN BLISTERS ON THE LEGS.   Multiple Vitamins-Minerals (THERAPEUTIC MULTIVITAMIN-MINERALS) tablet, Take 1 tablet by mouth daily  colchicine (COLCRYS) 0.6 MG tablet, Take 2 tablets now, then 1 tablet one hour later. Wait 12 hours then start 1 tablet bid for 5 days. (Patient taking differently: Take 0.6 mg by mouth daily Take 2 tablets now, then 1 tablet one hour later. Wait 12 hours then start 1 tablet bid for 5 days.)  Nebulizers (COMPRESSOR/NEBULIZER) MISC, Nebulizer with compressor. Disposable Med Nebs 2 /month. Reusable Med Nebs 1 per 6 months. Aerosol Mask 1 per month. Replacement Filters 2 per month. All other related supplies as needed per month. Medications being used: Albuterol . 083 unit dose vial. Frequency: every 6 hrs x 4/day . Length of Need: 1  albuterol (PROVENTIL) (2.5 MG/3ML) 0.083% nebulizer solution, Take 3 mLs by nebulization every 6 hours as needed for Wheezing or Shortness of Breath  Misc. Devices (ADJUST FOLD CANE/YORK HANDLE) MISC, Use daily for walking  Handicap Placard MISC, by Does not apply route Expires on 12/30/25  FreeStyle Lancets MISC, 1 each by Does not apply route daily Patient needs to contact office before any further refills will be approved  Bismuth Tribromoph-Petrolatum (XEROFORM PETROLATUM PATCH 2\"X2\") PADS external pads, Apply 1 each topically daily  blood glucose test strips (FREESTYLE LITE) strip, 1 each by In Vitro route daily As needed.   Blood Pressure KIT, 1 kit by Does not apply route three times daily  blood glucose monitor kit and supplies, 1 kit by Other route three times daily Dispense Butterfly Elite CBG Device      PAST MEDICAL HISTORY:  Past Medical History:   Diagnosis Date    [facility]A [facility]cerebrovascular accident) [facility] 7/25/2020    Altered mental status 5/26/2021    Arthritis     Brain mass     Cellulitis and abscess     Cellulitis and abscess of unspecified site     Cellulitis of both lower extremities 5/26/2021    Cellulitis of left lower extremity 7/26/2020    Cellulitis of lower extremity 10/4/2020    CHF (congestive heart failure) (HCC)     Chronic kidney disease, unspecified     Coronary atherosclerosis of native coronary artery     Essential hypertension 2020    Essential hypertension, malignant     Hallucinations 2021    Hard of hearing     Head contusion 2020    Hypokalemia 2020    Iron deficiency anemia, unspecified     [facility]    Myocardial infarction ([facility]     Other and unspecified hyperlipidemia     Pure hypercholesterolemia     Toxic metabolic encephalopathy     Type II or unspecified type diabetes mellitus without mention of complication, not stated as uncontrolled     Unspecified asthma(493.90)     Unspecified venous (peripheral) insufficiency     Unspecified vitamin D deficiency     UTI (urinary tract infection) 2021       PAST SURGICAL HISTORY:  Past Surgical History:   Procedure Laterality Date    CORONARY ARTERY BYPASS GRAFT      x 3, Dr. Edward Marie HISTORY:  Social History     Socioeconomic History    Marital status:       Spouse name: Not on file    Number of children: Not on file    Years of education: Not on file    Highest education level: Not on file   Occupational History    Not on file   Tobacco Use    Smoking status: Former Smoker     Packs/day: 0.25     Years: 10.00     Pack years: 2.50     Quit date: 2000     Years since quittin.1    Smokeless tobacco: Never Used   Vaping Use    Vaping Use: Never used   Substance and Sexual Activity    Alcohol use: Not Currently     Alcohol/week: 0.0 standard drinks    Drug use: No    Sexual activity: Not on file   Other Topics Concern    Not on file   Social History Narrative    Not on file     Social Determinants of Health     Financial Resource Strain: Low Risk     Difficulty of Paying Living Expenses: Not very hard   Food Insecurity: No Food Insecurity    Worried About Running Out of Food in the Last Year: Never true  Ran Out of Food in the Last Year: Never true   Transportation Needs:     Lack of Transportation (Medical): Not on file    Lack of Transportation (Non-Medical): Not on file   Physical Activity:     Days of Exercise per Week: Not on file    Minutes of Exercise per Session: Not on file   Stress:     Feeling of Stress : Not on file   Social Connections:     Frequency of Communication with Friends and Family: Not on file    Frequency of Social Gatherings with Friends and Family: Not on file    Attends Congregational Services: Not on file    Active Member of [address] [place] [address] [employer] or Organizations: Not on file    Attends Club or Organization Meetings: Not on file    Marital Status: Not on file   Intimate Partner Violence:     Fear of Current or Ex-Partner: Not on file    Emotionally Abused: Not on file    Physically Abused: Not on file    Sexually Abused: Not on file   Housing Stability:     Unable to Pay for Housing in the Last Year: Not on file    Number of [place] in the Last Year: Not on file    Unstable Housing in the Last Year: Not on file       FAMILY HISTORY:  Family History   Problem Relation Age of Onset    Diabetes Mother     Heart Disease Mother     Heart Disease Father     Diabetes Sister     High Blood Pressure Sister     Diabetes Maternal Grandmother        REVIEW OF SYSTEMS:  Cannot be obtained    PHYSICAL EXAM:  Vital Signs Blood pressure (!) 137/57, pulse 89, temperature 98.7 °F (37.1 °C), temperature source Infrared, resp. rate 22, height 4' 11\" (1.499 m), weight 165 lb 6.4 oz (75 kg), SpO2 100 %. Oxygen Amount and Delivery: O2 Flow Rate (L/min): 3 L/min    Admission Weight Weight: 134 lb (60.8 kg)    General Appearance     Head  Normocephalic, without obvious abnormality, atraumatic    Eyes  conjunctivae/corneas clear.  PERRL,.    ENT: Micrognathia, retrognathia, macroglossia  Neck  no adenopathy, no carotid bruit, no JVD, supple, symmetrical, trachea midline and thyroid not enlarged, symmetric, no tenderness/mass/nodules  Lungs markedly diminished poor air movement with crackles  Heart: regular rate and rhythm, S1, S2 normal, no murmur, click, rub or gallop  Abdomen  soft, non-tender; bowel sounds normal; no masses,  no organomegaly  Extremities  2+ bilateral lower extremity edema  Skin  Skin color, texture, turgor normal. No rashes or lesions  Neurologic: Lethargic        Imaging  Chest x-ray shows fluid in the fissure with bilateral pleural effusions      Lab Review  CBC     Lab Results   Component Value Date    WBC 11.5 03/03/2022    RBC 2.52 03/03/2022    HGB 7.6 03/04/2022    HCT 24.0 03/04/2022     03/03/2022    MCV 88.9 03/03/2022    MCH 29.2 03/03/2022    MCHC 32.9 03/03/2022    RDW 15.4 03/03/2022    LYMPHOPCT 7 03/01/2022    MONOPCT 5 03/01/2022    BASOPCT 0 03/01/2022    MONOSABS 0.51 03/01/2022    LYMPHSABS 0.71 03/01/2022    EOSABS 0.00 03/01/2022    BASOSABS 0.00 03/01/2022    DIFFTYPE NOT REPORTED 02/06/2022       BMP   Lab Results   Component Value Date     03/03/2022    K 4.0 03/03/2022     03/03/2022    CO2 22 03/03/2022    BUN 92 03/03/2022    CREATININE 1.80 03/03/2022    GLUCOSE 103 03/03/2022    CALCIUM 8.3 03/03/2022       LFTS  Lab Results   Component Value Date    ALKPHOS 230 02/06/2022    ALT 22 02/06/2022    AST 32 02/06/2022    PROT 6.3 02/06/2022    BILITOT 0.29 02/06/2022    LABALBU 3.3 02/06/2022       INR  No results for input(s): PROTIME, INR in the last 72 hours. APTT  No results for input(s): APTT in the last 72 hours.     Lactic Acid  Lab Results   Component Value Date    LACTA 1.2 02/18/2021    LACTA 1.8 07/25/2020        PRO-BNP   Recent Labs     03/01/22 2207 03/03/22  0444   PROBNP 5,281* 11,561*           ABGs:   Lab Results   Component Value Date    PHART 7.252 03/04/2022    PO2ART 72.4 03/04/2022    ULU1LBF 50.3 03/04/2022       No results found for: IFIO2, MODE, SETTIDVOL, SETPEEP      Impression    Acute hypercapnic respiratory failure  Acute CHF/volume overload  COPD-clinical diagnosis, unknown severity no PFTs  AZIZA-clinical diagnosis never had a sleep study  Nonmorbid obesity  Acute on chronic kidney injury/cardiorenal syndrome  Full CODE STATUS    Plan:      Transfer to ICU  Trial of BiPAP, check follow-up ABG if not improved or worse, will need to be intubated assuming that she remains a full code  Hep-Lock IV (discussed with nephrology service)  Based on DuoNeb aerosols every 4 hours  At this point, I do not think she needs steroids, she is not bronchospastic  Monitor H&H's, if stable would decrease frequency of blood draw  Told her daughter to discuss CODE STATUS with the rest of her siblings and make sure that they still wanted to be a full code  Follow-up chest x-ray tomorrow  Continue Protonix IV  Discussed with patient's daughter, respiratory and nursing staff as well as primary service  Critical care time 35-minutes

## 2022-03-04 NOTE — PROGRESS NOTES
Patient removed bipap mask refusing to put back on precedex started.  Patient currently on oxygen at 2 L until bipap can be resumed once patient has calm down

## 2022-03-05 ENCOUNTER — APPOINTMENT (OUTPATIENT)
Dept: GENERAL RADIOLOGY | Age: 72
DRG: 377 | End: 2022-03-05
Payer: OTHER GOVERNMENT

## 2022-03-05 LAB
ALBUMIN SERPL-MCNC: 2.3 G/DL (ref 3.5–5.2)
ALP BLD-CCNC: 184 U/L (ref 35–104)
ALT SERPL-CCNC: 25 U/L (ref 5–33)
ANION GAP SERPL CALCULATED.3IONS-SCNC: 11 MMOL/L (ref 9–17)
AST SERPL-CCNC: 40 U/L
BILIRUB SERPL-MCNC: 0.29 MG/DL (ref 0.3–1.2)
BILIRUBIN DIRECT: 0.13 MG/DL
BILIRUBIN, INDIRECT: 0.16 MG/DL (ref 0–1)
BUN BLDV-MCNC: 71 MG/DL (ref 8–23)
CALCIUM SERPL-MCNC: 8.2 MG/DL (ref 8.6–10.4)
CHLORIDE BLD-SCNC: 111 MMOL/L (ref 98–107)
CO2: 20 MMOL/L (ref 20–31)
CREAT SERPL-MCNC: 1.73 MG/DL (ref 0.5–0.9)
GFR AFRICAN AMERICAN: 35 ML/MIN
GFR NON-AFRICAN AMERICAN: 29 ML/MIN
GFR SERPL CREATININE-BSD FRML MDRD: ABNORMAL ML/MIN/{1.73_M2}
GLUCOSE BLD-MCNC: 79 MG/DL (ref 70–99)
HCT VFR BLD CALC: 22 % (ref 36–46)
HCT VFR BLD CALC: 24.2 % (ref 36–46)
HCT VFR BLD CALC: 24.8 % (ref 36–46)
HEMOGLOBIN: 6.9 G/DL (ref 12–16)
HEMOGLOBIN: 7.8 G/DL (ref 12–16)
HEMOGLOBIN: 7.9 G/DL (ref 12–16)
MCH RBC QN AUTO: 29.1 PG (ref 26–34)
MCHC RBC AUTO-ENTMCNC: 31.5 G/DL (ref 31–37)
MCV RBC AUTO: 92.4 FL (ref 80–100)
PDW BLD-RTO: 15.9 % (ref 11.5–14.9)
PLATELET # BLD: 189 K/UL (ref 150–450)
PMV BLD AUTO: 8.5 FL (ref 6–12)
POTASSIUM SERPL-SCNC: 4.1 MMOL/L (ref 3.7–5.3)
RBC # BLD: 2.38 M/UL (ref 4–5.2)
SODIUM BLD-SCNC: 142 MMOL/L (ref 135–144)
TOTAL PROTEIN: 4.3 G/DL (ref 6.4–8.3)
WBC # BLD: 5.9 K/UL (ref 3.5–11)

## 2022-03-05 PROCEDURE — 99233 SBSQ HOSP IP/OBS HIGH 50: CPT | Performed by: INTERNAL MEDICINE

## 2022-03-05 PROCEDURE — 80048 BASIC METABOLIC PNL TOTAL CA: CPT

## 2022-03-05 PROCEDURE — 6370000000 HC RX 637 (ALT 250 FOR IP): Performed by: INTERNAL MEDICINE

## 2022-03-05 PROCEDURE — 99232 SBSQ HOSP IP/OBS MODERATE 35: CPT | Performed by: PSYCHIATRY & NEUROLOGY

## 2022-03-05 PROCEDURE — 6360000002 HC RX W HCPCS: Performed by: INTERNAL MEDICINE

## 2022-03-05 PROCEDURE — 2580000003 HC RX 258: Performed by: INTERNAL MEDICINE

## 2022-03-05 PROCEDURE — 2500000003 HC RX 250 WO HCPCS: Performed by: INTERNAL MEDICINE

## 2022-03-05 PROCEDURE — 94640 AIRWAY INHALATION TREATMENT: CPT

## 2022-03-05 PROCEDURE — 85018 HEMOGLOBIN: CPT

## 2022-03-05 PROCEDURE — 94660 CPAP INITIATION&MGMT: CPT

## 2022-03-05 PROCEDURE — 2700000000 HC OXYGEN THERAPY PER DAY

## 2022-03-05 PROCEDURE — 85027 COMPLETE CBC AUTOMATED: CPT

## 2022-03-05 PROCEDURE — 94761 N-INVAS EAR/PLS OXIMETRY MLT: CPT

## 2022-03-05 PROCEDURE — 99232 SBSQ HOSP IP/OBS MODERATE 35: CPT | Performed by: INTERNAL MEDICINE

## 2022-03-05 PROCEDURE — 2000000000 HC ICU R&B

## 2022-03-05 PROCEDURE — C9113 INJ PANTOPRAZOLE SODIUM, VIA: HCPCS | Performed by: INTERNAL MEDICINE

## 2022-03-05 PROCEDURE — 71045 X-RAY EXAM CHEST 1 VIEW: CPT

## 2022-03-05 PROCEDURE — APPSS30 APP SPLIT SHARED TIME 16-30 MINUTES: Performed by: NURSE PRACTITIONER

## 2022-03-05 PROCEDURE — 85014 HEMATOCRIT: CPT

## 2022-03-05 PROCEDURE — 80076 HEPATIC FUNCTION PANEL: CPT

## 2022-03-05 PROCEDURE — 36415 COLL VENOUS BLD VENIPUNCTURE: CPT

## 2022-03-05 RX ORDER — BUMETANIDE 1 MG/1
1 TABLET ORAL DAILY
Status: DISCONTINUED | OUTPATIENT
Start: 2022-03-05 | End: 2022-03-07

## 2022-03-05 RX ORDER — FUROSEMIDE 10 MG/ML
60 INJECTION INTRAMUSCULAR; INTRAVENOUS ONCE
Status: COMPLETED | OUTPATIENT
Start: 2022-03-05 | End: 2022-03-05

## 2022-03-05 RX ADMIN — IPRATROPIUM BROMIDE AND ALBUTEROL SULFATE 1 AMPULE: 2.5; .5 SOLUTION RESPIRATORY (INHALATION) at 03:10

## 2022-03-05 RX ADMIN — ANTI-FUNGAL POWDER MICONAZOLE NITRATE TALC FREE: 1.42 POWDER TOPICAL at 10:39

## 2022-03-05 RX ADMIN — METOPROLOL 50 MG: 50 TABLET ORAL at 20:47

## 2022-03-05 RX ADMIN — IPRATROPIUM BROMIDE AND ALBUTEROL SULFATE 1 AMPULE: 2.5; .5 SOLUTION RESPIRATORY (INHALATION) at 19:43

## 2022-03-05 RX ADMIN — MAGNESIUM OXIDE TAB 400 MG (241.3 MG ELEMENTAL MG) 400 MG: 400 (241.3 MG) TAB at 12:33

## 2022-03-05 RX ADMIN — ACETAMINOPHEN 650 MG: 325 TABLET, FILM COATED ORAL at 20:52

## 2022-03-05 RX ADMIN — ALLOPURINOL 300 MG: 300 TABLET ORAL at 12:34

## 2022-03-05 RX ADMIN — MULTIPLE VITAMINS W/ MINERALS TAB 1 TABLET: TAB at 12:34

## 2022-03-05 RX ADMIN — AMLODIPINE BESYLATE 10 MG: 10 TABLET ORAL at 12:34

## 2022-03-05 RX ADMIN — MAGNESIUM OXIDE TAB 400 MG (241.3 MG ELEMENTAL MG) 400 MG: 400 (241.3 MG) TAB at 20:47

## 2022-03-05 RX ADMIN — DEXMEDETOMIDINE HYDROCHLORIDE 0.2 MCG/KG/HR: 400 INJECTION INTRAVENOUS at 05:46

## 2022-03-05 RX ADMIN — IPRATROPIUM BROMIDE AND ALBUTEROL SULFATE 1 AMPULE: 2.5; .5 SOLUTION RESPIRATORY (INHALATION) at 23:22

## 2022-03-05 RX ADMIN — ANTI-FUNGAL POWDER MICONAZOLE NITRATE TALC FREE: 1.42 POWDER TOPICAL at 20:47

## 2022-03-05 RX ADMIN — IPRATROPIUM BROMIDE AND ALBUTEROL SULFATE 1 AMPULE: 2.5; .5 SOLUTION RESPIRATORY (INHALATION) at 07:25

## 2022-03-05 RX ADMIN — SODIUM CHLORIDE, PRESERVATIVE FREE 10 ML: 5 INJECTION INTRAVENOUS at 10:40

## 2022-03-05 RX ADMIN — IPRATROPIUM BROMIDE AND ALBUTEROL SULFATE 1 AMPULE: 2.5; .5 SOLUTION RESPIRATORY (INHALATION) at 10:49

## 2022-03-05 RX ADMIN — CETIRIZINE HYDROCHLORIDE 5 MG: 5 SOLUTION ORAL at 12:39

## 2022-03-05 RX ADMIN — CHLORHEXIDINE GLUCONATE: 213 SOLUTION TOPICAL at 10:41

## 2022-03-05 RX ADMIN — SODIUM CHLORIDE, PRESERVATIVE FREE 5 ML: 5 INJECTION INTRAVENOUS at 20:48

## 2022-03-05 RX ADMIN — ACETAMINOPHEN 650 MG: 325 TABLET, FILM COATED ORAL at 15:15

## 2022-03-05 RX ADMIN — IRON SUCROSE 200 MG: 20 INJECTION, SOLUTION INTRAVENOUS at 10:44

## 2022-03-05 RX ADMIN — FUROSEMIDE 60 MG: 10 INJECTION, SOLUTION INTRAMUSCULAR; INTRAVENOUS at 17:35

## 2022-03-05 RX ADMIN — ATORVASTATIN CALCIUM 40 MG: 40 TABLET, FILM COATED ORAL at 12:34

## 2022-03-05 RX ADMIN — IPRATROPIUM BROMIDE AND ALBUTEROL SULFATE 1 AMPULE: 2.5; .5 SOLUTION RESPIRATORY (INHALATION) at 14:45

## 2022-03-05 RX ADMIN — BUMETANIDE 1 MG: 1 TABLET ORAL at 15:15

## 2022-03-05 RX ADMIN — PANTOPRAZOLE SODIUM 40 MG: 40 INJECTION, POWDER, FOR SOLUTION INTRAVENOUS at 10:39

## 2022-03-05 ASSESSMENT — PAIN SCALES - GENERAL
PAINLEVEL_OUTOF10: 0
PAINLEVEL_OUTOF10: 0
PAINLEVEL_OUTOF10: 5
PAINLEVEL_OUTOF10: 0
PAINLEVEL_OUTOF10: 3
PAINLEVEL_OUTOF10: 0

## 2022-03-05 NOTE — PROGRESS NOTES
BRONCHOSPASM/BRONCHOCONSTRICTION     [x]         IMPROVE AERATION/BREATH SOUNDS  [x]   ADMINISTER BRONCHODILATOR THERAPY AS APPROPRIATE  [x]   ASSESS BREATH SOUNDS  []   IMPLEMENT AEROSOL/MDI PROTOCOL  [x]   PATIENT EDUCATION AS NEEDED      NONINVASIVE VENTILATION    PROVIDE OPTIMAL VENTILATION/ACCEPTABLE SPO2   IMPLEMENT NONINVASIVE VENTILATION PROTOCOL   MAINTAIN ACCEPTABLE SPO2   ASSESS SKIN INTEGRITY/BREAKDOWN SCORE   PATIENT EDUCATION AS NEEDED   BIPAP AS NEEDED

## 2022-03-05 NOTE — PROGRESS NOTES
GI Progress notes    3/5/2022   12:51 PM    Name:  Romero Fernandez  MRN:    [id number]     Acct:     [de-identified]   Room:  2005/2005-01  IP Day: 3     Admit Date: 3/1/2022  9:47 PM  PCP: Denia Yang MD    Subjective:     C/C:   Chief Complaint   Patient presents with    Fatigue       Interval History: Status: improved. Patient seen and examined. No acute events overnight. Transferred to ICU yesterday for increased lethargy, elevated CO2  At present patient on 3 L via NC  Denies any abdominal pain, nausea, vomiting  Hgb 6.9  No overt GI bleeding. ROS:  Constitutional: negative for chills, fevers and sweats  Gastrointestinal: negative for abdominal pain, constipation, diarrhea, nausea and vomiting      Medications: Allergies:    Allergies   Allergen Reactions    Latex Rash    Aleve [Naproxen Sodium]      Chronic kidney disease stage III, CHF    Pioglitazone Other (See Comments)     Congestive heart failure    Claritin [Loratadine]     Keflex [Cephalexin]     Lisinopril      Needs clarification of contraindication       Current Meds: bumetanide (BUMEX) tablet 1 mg, Daily  ipratropium-albuterol (DUONEB) nebulizer solution 1 ampule, Q4H  dexmedetomidine (PRECEDEX) 400 mcg in sodium chloride 0.9 % 100 mL infusion, Continuous  sodium chloride flush 0.9 % injection 5-40 mL, 2 times per day  sodium chloride flush 0.9 % injection 10 mL, PRN  0.9 % sodium chloride infusion, PRN  ondansetron (ZOFRAN-ODT) disintegrating tablet 4 mg, Q8H PRN   Or  ondansetron (ZOFRAN) injection 4 mg, Q6H PRN  magnesium hydroxide (MILK OF MAGNESIA) 400 MG/5ML suspension 30 mL, Daily PRN  acetaminophen (TYLENOL) tablet 650 mg, Q6H PRN   Or  acetaminophen (TYLENOL) suppository 650 mg, Q6H PRN  albuterol (PROVENTIL) nebulizer solution 2.5 mg, Q6H PRN  allopurinol (ZYLOPRIM) tablet 300 mg, Daily  amLODIPine (NORVASC) tablet 10 mg, Daily  atorvastatin (LIPITOR) tablet 40 mg, Daily  chlorhexidine (HIBICLENS) 4 % liquid, Daily  [Held by provider] clopidogrel (PLAVIX) tablet 75 mg, Daily  magnesium oxide (MAG-OX) tablet 400 mg, BID  metoprolol tartrate (LOPRESSOR) tablet 50 mg, BID  miconazole (MICOTIN) 2 % powder, BID  therapeutic multivitamin-minerals 1 tablet, Daily  pantoprazole (PROTONIX) injection 40 mg, Daily   And  sodium chloride flush 0.9 % injection 10 mL, Daily  0.9 % sodium chloride infusion, PRN  cetirizine HCl (ZYRTEC) 5 MG/5ML solution 5 mg, Daily  iron sucrose (VENOFER) 200 mg in sodium chloride 0.9 % 100 mL IVPB, Q24H        Data:     Code Status:  Full Code    Family History   Problem Relation Age of Onset    Diabetes Mother     Heart Disease Mother     Heart Disease Father     Diabetes Sister     High Blood Pressure Sister     Diabetes Maternal Grandmother        Social History     Socioeconomic History    Marital status:      Spouse name: Not on file    Number of children: Not on file    Years of education: Not on file    Highest education level: Not on file   Occupational History    Not on file   Tobacco Use    Smoking status: Former Smoker     Packs/day: 0.25     Years: 10.00     Pack years: 2.50     Quit date: 2000     Years since quittin.1    Smokeless tobacco: Never Used   Vaping Use    Vaping Use: Never used   Substance and Sexual Activity    Alcohol use: Not Currently     Alcohol/week: 0.0 standard drinks    Drug use: No    Sexual activity: Not on file   Other Topics Concern    Not on file   Social History Narrative    Not on file     Social Determinants of Health     Financial Resource Strain: Low Risk     Difficulty of Paying Living Expenses: Not very hard   Food Insecurity: No Food Insecurity    Worried About Running Out of Food in the Last Year: Never true    Holden of Food in the Last Year: Never true   Transportation Needs:     Lack of Transportation (Medical): Not on file    Lack of Transportation (Non-Medical):  Not on file   Physical Activity:     Days of Exercise per Week: Not on file    Minutes of Exercise per Session: Not on file   Stress:     Feeling of Stress : Not on file   Social Connections:     Frequency of Communication with Friends and Family: Not on file    Frequency of Social Gatherings with Friends and Family: Not on file    Attends [employer] Services: Not on file    Active Member of [address] [place] [address] [employer] or Organizations: Not on file    Attends Club or Organization Meetings: Not on file    Marital Status: Not on file   Intimate Partner Violence:     Fear of Current or Ex-Partner: Not on file    Emotionally Abused: Not on file    Physically Abused: Not on file    Sexually Abused: Not on file   Housing Stability:     Unable to Pay for Housing in the Last Year: Not on file    Number of [place] in the Last Year: Not on file    Unstable Housing in the Last Year: Not on file       Vitals:  BP (!) 116/30   Pulse 76   Temp 97 °F (36.1 °C) (Temporal)   Resp 14   Ht 4' 11\" (1.499 m)   Wt 152 lb 12.5 oz (69.3 kg)   SpO2 94%   BMI 30.86 kg/m²   Temp (24hrs), Av.9 °F (36.6 °C), Min:97 °F (36.1 °C), Max:98.6 °F (37 °C)    Recent Labs     22  1543 22  1949 22  0928 22  1137   POCGLU 100 107* 87 83       I/O (24Hr):     Intake/Output Summary (Last 24 hours) at 3/5/2022 1251  Last data filed at 3/4/2022 2220  Gross per 24 hour   Intake 538.43 ml   Output --   Net 538.43 ml       Labs:      CBC:   Lab Results   Component Value Date    WBC 5.9 2022    RBC 2.38 2022    HGB 6.9 2022    HCT 22.0 2022    MCV 92.4 2022    MCH 29.1 2022    MCHC 31.5 2022    RDW 15.9 2022     2022    MPV 8.5 2022     CBC with Differential:    Lab Results   Component Value Date    WBC 5.9 2022    RBC 2.38 2022    HGB 6.9 2022    HCT 22.0 2022     2022    MCV 92.4 2022    MCH 29.1 2022    MCHC 31.5 2022    RDW 15.9 2022    LYMPHOPCT 7 03/01/2022    MONOPCT 5 03/01/2022    BASOPCT 0 03/01/2022    MONOSABS 0.51 03/01/2022    LYMPHSABS 0.71 03/01/2022    EOSABS 0.00 03/01/2022    BASOSABS 0.00 03/01/2022    DIFFTYPE NOT REPORTED 02/06/2022     Hemoglobin/Hematocrit:    Lab Results   Component Value Date    HGB 6.9 03/05/2022    HCT 22.0 03/05/2022     CMP:    Lab Results   Component Value Date     03/05/2022    K 4.1 03/05/2022     03/05/2022    CO2 20 03/05/2022    BUN 71 03/05/2022    CREATININE 1.73 03/05/2022    GFRAA 35 03/05/2022    LABGLOM 29 03/05/2022    GLUCOSE 79 03/05/2022    PROT 4.3 03/05/2022    LABALBU 2.3 03/05/2022    CALCIUM 8.2 03/05/2022    BILITOT 0.29 03/05/2022    ALKPHOS 184 03/05/2022    AST 40 03/05/2022    ALT 25 03/05/2022     BMP:    Lab Results   Component Value Date     03/05/2022    K 4.1 03/05/2022     03/05/2022    CO2 20 03/05/2022    BUN 71 03/05/2022    LABALBU 2.3 03/05/2022    CREATININE 1.73 03/05/2022    CALCIUM 8.2 03/05/2022    GFRAA 35 03/05/2022    LABGLOM 29 03/05/2022    GLUCOSE 79 03/05/2022     PT/INR:    Lab Results   Component Value Date    PROTIME 16.0 02/06/2022    INR 1.3 02/06/2022     PTT:    Lab Results   Component Value Date    APTT 34.9 02/06/2022   [APTT}    Physical Examination:        General appearance: alert, cooperative and no distress  Mental Status: oriented to person, place and time and normal affect  Abdomen: soft, nontender, nondistended, bowel sounds present    Assessment:        Primary Problem  Acute GI bleeding     Active Hospital Problems    Diagnosis Date Noted    Symptomatic anemia [D64.9]     Family history of colon cancer [Z80.0]     Family history of pancreatic cancer [Z80.0]     Elevated LFTs [R79.89] 02/25/2021    Paroxysmal atrial fibrillation ([facility] [I48.0] 07/28/2020    Stage 3 chronic kidney disease ([facility] [N18.30]      Past Medical History:   Diagnosis Date    Acute CVA (cerebrovascular accident) ([facility] 7/25/2020    Altered mental status 5/26/2021    Arthritis     Brain mass     Cellulitis and abscess     Cellulitis and abscess of unspecified site     Cellulitis of both lower extremities 5/26/2021    Cellulitis of left lower extremity 7/26/2020    Cellulitis of lower extremity 10/4/2020    CHF (congestive heart failure) (HCC)     Chronic kidney disease, unspecified     Coronary atherosclerosis of native coronary artery     Essential hypertension 7/25/2020    Essential hypertension, malignant     Hallucinations 2/18/2021    Hard of hearing     Head contusion 9/9/2020    Hypokalemia 9/9/2020    Iron deficiency anemia, unspecified     Mening[facility] 2/25/2021    Myocardial infarction ([facility]     Other and unspecified hyperlipidemia     Pure hypercholesterolemia     Toxic metabolic encephalopathy 5/12/8719    Type II or unspecified type diabetes mellitus without mention of complication, not stated as uncontrolled     Unspecified asthma(493.90)     Unspecified venous (peripheral) insufficiency     Unspecified vitamin D deficiency     UTI (urinary tract infection) 2/18/2021        Plan:        1. BRIGIDO  1. No overt bleeding  2. Hgb 6.9  3. Continue Venofer  4. Will plan GI workup once respiratory status improved, neurology workup in process.     Explained to the patient and d/W Nursing Staff  Will F/U with you  Please call or Page for any issues or change in status  Thanks    Electronically signed by MAL Roque NP on 3/5/2022 at 12:51 PM

## 2022-03-05 NOTE — FLOWSHEET NOTE
03/04/22 1910   Encounter Summary   Services provided to: Patient   Referral/Consult From: Last   Complexity of Encounter Low   Length of Encounter 15 minutes   Spiritual/Anabaptism   Type Spiritual support   Assessment Sleeping   Intervention Prayer

## 2022-03-05 NOTE — PROGRESS NOTES
Department of Internal Medicine  Nephrology Ernesto Dick MD   progress note    Reason for consultation: Management of acute kidney injury superimposed on chronic kidney disease stage III. Subjective/interval history:    Patient seen and examined in ICU  Patient is awake and responsive on BiPAP  Serum creatinine stable at 1.7 mg/dL  Urine output noted with external catheter  Hemoglobin 6.9    History of present illness: This is a 66 y.o. female with a significant past medical history of Heart Failure with reduced ejection fraction [HFrEF with LVEF 45 to 50% in July 2020 secondary to ischemic cardiomyopathy], Coronary artery disease, Meningioma, type 2 diabetes mellitus [with nonproliferative diabetic retinopathy], essential hypertension, bilateral deafness and chronic kidney disease stage III [baseline serum creatinine 1.6 mg/dL and follows up with Dr. Cristhian Olmos of renal services of [place] whom she saw 1 week ago and was switched from Lasix to Bumex], presented with complaints of fatigue and dyspnea with exertion progressively worsened over 2 weeks. She had been recently admitted and seen by us at [facility] on 2/11/2022. Laboratory studies at presentation however were remarkable for hemoglobin 7.4 g/dL and BUN/creatinine 110/2.11 mg/dL. She denies hemoptysis or melena stools. However, hemoglobin dropped overnight to 4.4 g/dL and she is scheduled for endoscopy tomorrow.     Latex, Aleve [naproxen sodium], Pioglitazone, Claritin [loratadine], Keflex [cephalexin], and Lisinopril    Past Medical History:   Diagnosis Date    Acute CVA (cerebrovascular accident) ([facility] [address] 7/25/2020    Altered mental status 5/26/2021    Arthritis     Brain mass     Cellulitis and abscess     Cellulitis and abscess of unspecified site     Cellulitis of both lower extremities 5/26/2021    Cellulitis of left lower extremity 7/26/2020    Cellulitis of lower extremity 10/4/2020    [facility]     Chronic kidney disease, unspecified     Coronary atherosclerosis of native coronary artery     Essential hypertension 7/25/2020    Essential hypertension, malignant     Hallucinations 2/18/2021    Hard of hearing     Head contusion 9/9/2020    Hypokalemia 9/9/2020    Iron deficiency anemia, unspecified     Meningioma [facility] 2/25/2021    Myocardial infarction ([facility]     Other and unspecified hyperlipidemia     Pure hypercholesterolemia     Toxic metabolic encephalopathy 6/32/9847    Type II or unspecified type diabetes mellitus without mention of complication, not stated as uncontrolled     Unspecified asthma(493.90)     Unspecified venous (peripheral) insufficiency     Unspecified vitamin D deficiency     UTI (urinary tract infection) 2/18/2021       Scheduled Meds:   ipratropium-albuterol  1 ampule Inhalation Q4H    sodium chloride flush  5-40 mL IntraVENous 2 times per day    allopurinol  300 mg Oral Daily    amLODIPine  10 mg Oral Daily    atorvastatin  40 mg Oral Daily    [Held by provider] bumetanide  1 mg Oral BID    chlorhexidine   Topical Daily    [Held by provider] clopidogrel  75 mg Oral Daily    magnesium oxide  400 mg Oral BID    metoprolol tartrate  50 mg Oral BID    miconazole   Topical BID    therapeutic multivitamin-minerals  1 tablet Oral Daily    pantoprazole  40 mg IntraVENous Daily    And    sodium chloride flush  10 mL IntraVENous Daily    cetirizine HCl  5 mg Oral Daily    iron sucrose  200 mg IntraVENous Q24H     Continuous Infusions:   dexmedetomidine Stopped (03/05/22 0832)    sodium chloride      sodium chloride       PRN Meds:.sodium chloride flush, sodium chloride, ondansetron **OR** ondansetron, magnesium hydroxide, acetaminophen **OR** acetaminophen, albuterol, sodium chloride        Physical Exam:    VITALS:  BP (!) 116/30   Pulse 76   Temp 97 °F (36.1 °C) (Temporal)   Resp 14   Ht 4' 11\" (1.499 m)   Wt 152 lb 12.5 oz (69.3 kg)   SpO2 94%   BMI 30.86 kg/m²     Constitutional: alert, appears stated age and cooperative on BiPAP    Skin: Skin color, texture, turgor normal. No rashes or lesions    Head: Normocephalic, without obvious abnormality, atraumatic     Cardiovascular/Edema :regular rate and rhythm    Respiratory: Lungs: Bilateral chest rise, on BiPAP    Abdomen: Soft nontender    Back: symmetric, no curvature. ROM normal. No CVA tenderness. Extremities: 1+ edema bilaterally    Neuro:  Grossly normal      CBC:   Recent Labs     03/03/22  0444 03/03/22  1725 03/04/22  1417 03/04/22  2317 03/05/22  0524   WBC 11.5*  --  10.7  --  5.9   HGB 7.4*   < > 7.7* 6.9* 6.9*     --  214  --  189    < > = values in this interval not displayed. BMP:    Recent Labs     03/03/22 0444 03/04/22  1417 03/05/22  0524    139 142   K 4.0 4.4 4.1    106 111*   CO2 22 20 20   BUN 92* 71* 71*   CREATININE 1.80* 1.73* 1.73*   GLUCOSE 103* 93 79       Lab Results   Component Value Date    NITRU NEGATIVE 02/22/2022    COLORU Yellow 02/22/2022    PHUR 5.0 02/22/2022    WBCUA 10 TO 20 02/22/2022    RBCUA TOO NUMEROUS TO COUNT 02/22/2022    MUCUS NOT REPORTED 02/07/2022    TRICHOMONAS NOT REPORTED 02/07/2022    YEAST NOT REPORTED 02/07/2022    BACTERIA None 02/22/2022    SPECGRAV 1.016 02/22/2022    LEUKOCYTESUR NEGATIVE 02/22/2022    UROBILINOGEN Normal 02/22/2022    BILIRUBINUR NEGATIVE 02/22/2022    GLUCOSEU TRACE 02/22/2022    KETUA NEGATIVE 02/22/2022    AMORPHOUS NOT REPORTED 02/07/2022     Urine Protein:     Lab Results   Component Value Date     10/27/2020     Urine Creatinine:     Lab Results   Component Value Date    LABCREA 26.2 05/26/2021     IMPRESSION/RECOMMENDATIONS:      1. Acute kidney injury superimposed on chronic kidney disease stage III most consistent with prerenal azotemia led to dehydration and anemia. Serum creatinine stable at 1.7 mg/dL      2. Systemic hypertension controlled.     3.  Acute on chronic anemia - rule out GI bleed. Plavix is on hold. 4.  CHF with preserved EF moderate left ventricular hypertrophy diastolic dysfunction, mild to moderate aortic regurgitation, proBNP increased from 5,281--11,561    Plan  KVO IV fluids  Resume Bumex  Prognosis is guarded. Thank you very much for the courtesy and confidence of this consultation.     Griselda Mowers, MD   Attending Nephrologist  3/5/2022 12:20 PM

## 2022-03-05 NOTE — PROGRESS NOTES
Comprehensive Nutrition Assessment    Type and Reason for Visit:  Reassess    Nutrition Recommendations/Plan: Continue current diet. Add Ensure Enlive x 1 daily. Nutrition Assessment:  EGD and colonoscopy was canceled 3/4 since pt was lethargic and confused. Pt has since been transferred to ICU and placed on BiPAP. Pt less confused today. PO intake has not been adequate since admission with pt NPO or on Clear Liquids for majority of admission. Full Liquid diet has now been ordered. Will add Ensure Enlive x 1daily. Malnutrition Assessment:  Malnutrition Status: At risk for malnutrition (Comment)    Context:  Acute Illness     Findings of the 6 clinical characteristics of malnutrition:  Energy Intake:  Mild decrease in energy intake (Comment)  Weight Loss:  No significant weight loss     Body Fat Loss:  No significant body fat loss     Muscle Mass Loss:  No significant muscle mass loss    Fluid Accumulation:  1 - Mild (May not be related to nutritional status) Extremities   Strength:  Not Performed    Estimated Daily Nutrient Needs:  Energy (kcal):  3688-7218 kcals based on 25-27 kcals/kg using adm wt 61 kg; Weight Used for Energy Requirements:  Admission     Protein (g):  69-77 gm protein based on 1.6-1.8 gm/kg using IBW.; Weight Used for Protein Requirements:  Ideal          Nutrition Related Findings:  Edema: Trace Generalized; +1 pitting RLE, LLE. Labs and meds reviewed. Wounds:   (Excoriation)       Current Nutrition Therapies:    ADULT DIET; Full Liquid    Anthropometric Measures:  · Height: 4' 11\" (149.9 cm)  · Current Body Weight: 152 lb 12.5 oz (69.3 kg) (positive fluid balance)   · Admission Body Weight: 135 lb (61.2 kg)    · Ideal Body Weight: 95 lbs; % Ideal Body Weight 142.1 %   · BMI: 30.8  · BMI Categories: Overweight (BMI 25.0-29. 9) (Based on admission wt)       Nutrition Diagnosis:   · Inadequate oral intake related to altered GI function as evidenced by previous NPO or clear liquid status due to medical condition    Nutrition Interventions:   Food and/or Nutrient Delivery:  Start Oral Diet  Nutrition Education/Counseling:  No recommendation at this time   Coordination of Nutrition Care:  Continue to monitor while inpatient    Goals:  Meet estimated nutrient needs       Nutrition Monitoring and Evaluation:   Behavioral-Environmental Outcomes:  None Identified   Food/Nutrient Intake Outcomes:  Diet Advancement/Tolerance,Food and Nutrient Intake,Supplement Intake (If appropriate)  Physical Signs/Symptoms Outcomes:  Biochemical Data,GI Status,Fluid Status or Edema,Nutrition Focused Physical Findings,Skin,Weight     Discharge Planning: Too soon to determine     Some areas of assessment may be incomplete due to standard COVID-19 Precautions. Va Mann R.D., L.D.   Phone: [phone]

## 2022-03-05 NOTE — PROGRESS NOTES
12/06/2021    HDL 48 07/27/2020    HDL 55 03/11/2020     No results found for: LDLCALC  No results found for: LABVLDL  Lab Results   Component Value Date    LABA1C 5.8 11/24/2021     Lab Results   Component Value Date     10/27/2020     Lab Results   Component Value Date    [id number] 082 03/02/2022      Neurological work up:  CT head 3/4/2022 with diffuse cortical atrophy  CTA head and neck  MRI brain 2/18/2021 with right middle cranial fossa meningioma 2.3 cm  2 D echo     Assessment and Recommendations:   Encephalopathy, likely toxic metabolic secondary to hospital induced delirium, hypercarbia  Right middle cranial fossa meningioma 2.3 cm, 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 No further imaging studies warranted  We will sign off. Please call with questions. Thank you for the consult. Edwin Maxwell MD  Neurology    This note is created with the assistance of a speech-recognition program. While intending to generate a document that actually reflects the content of the visit, the document can still have some errors including those of syntax and sound a- like substitutions which may escape proofreading. In such instances, actual meaning can be extrapolated by contextual derivation.

## 2022-03-05 NOTE — FLOWSHEET NOTE
SC visit with patient and her daughter Herbert Gutierrez. Patient is feeling better, very thirsty. She is [place]. She asked for and appreciated prayer. 03/05/22 1510   Encounter Summary   Services provided to: Patient and family together   Referral/Consult From: [facility]   (3-5-22)   Complexity of Encounter Moderate   Length of Encounter 15 minutes   Spiritual Assessment Completed Yes   Spiritual/Sabianism   Type Spiritual support   Assessment Approachable; Anxious   Intervention Active listening;Explored feelings, thoughts, concerns;Prayer;Sustaining presence/ Ministry of presence; Discussed illness/injury and it's impact   Outcome Expressed gratitude;Engaged in conversation;Expressed feelings/needs/concerns;Receptive

## 2022-03-05 NOTE — PROGRESS NOTES
Gabriel Yusuf MD/Rahul Mahajan MD/ Branden Alvarez MD/Dr Manuel Butts APRN AGACNP-BC, NP-C      Gloria Bowen APRN NP-C     Leavy Severs APRN NP-C                                           Pulmonary Progress Note    Patient - Edgar Crocker   Age - 66 y.o.   - 1950  MRN - [id number]  Acct # - [de-identified]  Date of Admission - 3/1/2022  9:47 PM    Consulting Service/Physician:       Primary Care Physician: Mark Anthony Reid MD    SUBJECTIVE:     Chief Complaint:   Chief Complaint   Patient presents with    Fatigue   Acute hypercapnia  Subjective:    Very hard of hearing. SOB and mental status much better per daughter. Patient denies CP, cough or phlegm. She is feeling better. VITALS  BP (!) 116/30   Pulse 76   Temp 97 °F (36.1 °C) (Temporal)   Resp 14   Ht 4' 11\" (1.499 m)   Wt 152 lb 12.5 oz (69.3 kg)   SpO2 94%   BMI 30.86 kg/m²   Wt Readings from Last 3 Encounters:   22 152 lb 12.5 oz (69.3 kg)   22 156 lb 1.4 oz (70.8 kg)   22 143 lb 6.4 oz (65 kg)     I/O (24 Hours)    Intake/Output Summary (Last 24 hours) at 3/5/2022 1624  Last data filed at 3/5/2022 1420  Gross per 24 hour   Intake 838.43 ml   Output 600 ml   Net 238.43 ml     Ventilator:   Settings  FiO2 : 30 %  Insp Rise Time (%): 1 %  Exam:   Physical Exam   Constitutional:  A&O times 3, obese, no distress  HENT: Unremarkable, NC O2  Head: Normocephalic and atraumatic. Eyes: EOM are normal. Pupils are equal, round, and reactive to light. Neck: Neck supple. Thick,+JVD  Cardiovascular:  Regular rate and rhythm. Normal heart tones. No JVD. Pulmonary/Chest: absent BS bases, sternotomy  Abdominal: Soft. Bowel sounds are normal. There is no tenderness. Obese and distended  Musculoskeletal: Normal range of motion. She exhibits no edema or tenderness. Neurological:patient is alert and oriented to person, place, and time.    Skin: Skin is warm and dry. No rash noted.    Extremities: mild leg edema  Infusions:      dexmedetomidine Stopped (03/05/22 0751)    sodium chloride      sodium chloride       Meds:     Current Facility-Administered Medications:     bumetanide (BUMEX) tablet 1 mg, 1 mg, Oral, Daily, MD Elysia, 1 mg at 03/05/22 1515    ipratropium-albuterol (DUONEB) nebulizer solution 1 ampule, 1 ampule, Inhalation, Q4H, Elvis Rico MD, 1 ampule at 03/05/22 1445    dexmedetomidine (PRECEDEX) 400 mcg in sodium chloride 0.9 % 100 mL infusion, 0.1-1.5 mcg/kg/hr, IntraVENous, Continuous, Elvis Rico MD, Stopped at 03/05/22 1196    sodium chloride flush 0.9 % injection 5-40 mL, 5-40 mL, IntraVENous, 2 times per day, Madeline Almodovar MD, 10 mL at 03/05/22 1040    sodium chloride flush 0.9 % injection 10 mL, 10 mL, IntraVENous, PRN, Andrea Connor MD    0.9 % sodium chloride infusion, 25 mL, IntraVENous, PRN, Madeline Almodovar MD    ondansetron (ZOFRAN-ODT) disintegrating tablet 4 mg, 4 mg, Oral, Q8H PRN **OR** ondansetron (ZOFRAN) injection 4 mg, 4 mg, IntraVENous, Q6H PRN, Madeline Almodovar MD    magnesium hydroxide (MILK OF MAGNESIA) 400 MG/5ML suspension 30 mL, 30 mL, Oral, Daily PRN, Madeline Almodovar MD    acetaminophen (TYLENOL) tablet 650 mg, 650 mg, Oral, Q6H PRN, 650 mg at 03/05/22 1515 **OR** acetaminophen (TYLENOL) suppository 650 mg, 650 mg, Rectal, Q6H PRN, Madeline Almodovar MD    albuterol (PROVENTIL) nebulizer solution 2.5 mg, 2.5 mg, Nebulization, Q6H PRN, Andrea Connor MD, 2.5 mg at 03/03/22 1200    allopurinol (ZYLOPRIM) tablet 300 mg, 300 mg, Oral, Daily, Andrea Connor MD, 300 mg at 03/05/22 1234    amLODIPine (NORVASC) tablet 10 mg, 10 mg, Oral, Daily, Andrea Connor MD, 10 mg at 03/05/22 1234    atorvastatin (LIPITOR) tablet 40 mg, 40 mg, Oral, Daily, Andrea Connor MD, 40 mg at 03/05/22 1234    chlorhexidine (HIBICLENS) 4 % liquid, , Topical, Daily, Madeline Almodovar MD, Given at 03/05/22 [address] [place] [address] Street by provider] clopidogrel (PLAVIX) tablet 75 mg, 75 mg, Oral, Daily, Maggie Fitzgerald APRN - CNP    magnesium oxide (MAG-OX) tablet 400 mg, 400 mg, Oral, BID, Andrea Connor MD, 400 mg at 03/05/22 1233    metoprolol tartrate (LOPRESSOR) tablet 50 mg, 50 mg, Oral, BID, Andrea Connor MD, 50 mg at 03/03/22 2131    miconazole (MICOTIN) 2 % powder, , Topical, BID, Laure Snowden MD, Given at 03/05/22 1039    therapeutic multivitamin-minerals 1 tablet, 1 tablet, Oral, Daily, Laure Snowden MD, 1 tablet at 03/05/22 1234    pantoprazole (PROTONIX) injection 40 mg, 40 mg, IntraVENous, Daily, 40 mg at 03/05/22 1039 **AND** sodium chloride flush 0.9 % injection 10 mL, 10 mL, IntraVENous, Daily, Andrea Connor MD, 10 mL at 03/05/22 1040    0.9 % sodium chloride infusion, , IntraVENous, PRN, Laure Snowden MD    cetirizine HCl (ZYRTEC) 5 MG/5ML solution 5 mg, 5 mg, Oral, Daily, Andrea Connor MD, 5 mg at 03/05/22 1239    iron sucrose (VENOFER) 200 mg in sodium chloride 0.9 % 100 mL IVPB, 200 mg, IntraVENous, Q24H, Andrea Connor MD, Stopped at 03/05/22 1209    Lab Results:     Lab Results   Component Value Date    WBC 5.9 03/05/2022    HGB 7.8 (L) 03/05/2022    HCT 24.8 (L) 03/05/2022    MCV 92.4 03/05/2022     03/05/2022     Lab Results   Component Value Date    CALCIUM 8.2 (L) 03/05/2022     03/05/2022    K 4.1 03/05/2022    CO2 20 03/05/2022     (H) 03/05/2022    BUN 71 (H) 03/05/2022    CREATININE 1.73 (H) 03/05/2022     No components found for: ABGSAMPLETYP, ABGBODYTEMP, ABGPHCORRFOR, [id number], ABGPHCORRFOOR, ABGPH, ABGPCO2, ABGPO2, ABGBASEEXCES, ABGBASEDEFIC, ABGHCO3, NBQP1VOG, ABGENDTIDALC, ABGALLENSTES, ABGSPO2, ABGSAMEPLESIT, [id number], ABGOXYGENSOU  Lab Results   Component Value Date    INR 1.3 02/06/2022    PROTIME 16.0 (H) 02/06/2022       Radiology:   [unfilled]  My reading of film: CXR still shows chf with bilateral effusions R>L      ASSESSMENT: 1. Acute hypercapnic respiratory failure-improved, LOC much better  2. Hypoxia-not normally on home O2  3. A on C diastolic CHF-Dr Melton  4. CAD-history of CABG [facility]  5. Mild AS  6. Mild to mod AR  7. COPD? Uses inhaler, no pulmonologist, no PFT, smoked for 30+ years quitting in 2000  8. CKDz III  9. A fib-eliquis held for anemia  10. DM2  11. AZIZA? Never tested but does snore and nap  12. Anemia-GI source eval underway, Hgb [place] 4.4 s/p transfusion  13. Full code  PLAN:   1. Diuresis  2. Consult cardiologist per family request  3. Wean off O2  4. Thoracentesis if effusions don't respond to diuresis  5. Outpatient PFT/PSG/pulmonary follow up recommended  6. D/c precedex  7. GI workup for anemia    OK transfer out of ICU. She looks clinically much better. Electronically signed by Jose Luis Matute MD on 03/05/22     This progress note was completed using a voice transcription system. Every effort was made to ensure accuracy. However, inadvertent computerized transcription errors may be present.     Natalia Mahajan MD  Pulmonary and Critical Care

## 2022-03-05 NOTE — CARE COORDINATION
ONGOING DISCHARGE PLAN:    Writer to bedside and spoke with patient and her daughter Jaylene Zuleta. Discharge plan for the patient is home with son and Jaylene Zuleta calls her throughout the day and is checking in on her. Jaylene Zuleta stated they refuse SNF as they do not have coverage for that and they refuse [facility]NS as they do not want anyone in the home. IV iron and last H&H was 7.8 and 24.8. Will continue to follow for additional discharge needs.     Electronically signed by Chante Clayton RN on 3/5/2022 at 3:42 PM

## 2022-03-05 NOTE — PLAN OF CARE
Nutrition Problem #1: Inadequate oral intake  Intervention: Food and/or Nutrient Delivery: Start Oral Diet  Nutritional Goals: Meet estimated nutrient needs

## 2022-03-06 LAB
ABSOLUTE EOS #: 0.1 K/UL (ref 0–0.4)
ABSOLUTE LYMPH #: 0.21 K/UL (ref 1–4.8)
ABSOLUTE MONO #: 0.93 K/UL (ref 0.1–1.3)
ANION GAP SERPL CALCULATED.3IONS-SCNC: 13 MMOL/L (ref 9–17)
BASOPHILS # BLD: 0 % (ref 0–2)
BASOPHILS ABSOLUTE: 0 K/UL (ref 0–0.2)
BUN BLDV-MCNC: 68 MG/DL (ref 8–23)
CALCIUM SERPL-MCNC: 8 MG/DL (ref 8.6–10.4)
CHLORIDE BLD-SCNC: 106 MMOL/L (ref 98–107)
CO2: 21 MMOL/L (ref 20–31)
CREAT SERPL-MCNC: 1.69 MG/DL (ref 0.5–0.9)
EOSINOPHILS RELATIVE PERCENT: 1 % (ref 0–4)
GFR AFRICAN AMERICAN: 36 ML/MIN
GFR NON-AFRICAN AMERICAN: 30 ML/MIN
GFR SERPL CREATININE-BSD FRML MDRD: ABNORMAL ML/MIN/{1.73_M2}
GLUCOSE BLD-MCNC: 142 MG/DL (ref 70–99)
HCT VFR BLD CALC: 22 % (ref 36–46)
HCT VFR BLD CALC: 23.9 % (ref 36–46)
HCT VFR BLD CALC: 24.4 % (ref 36–46)
HCT VFR BLD CALC: 24.5 % (ref 36–46)
HEMOGLOBIN: 7.4 G/DL (ref 12–16)
HEMOGLOBIN: 7.7 G/DL (ref 12–16)
HEMOGLOBIN: 7.8 G/DL (ref 12–16)
HEMOGLOBIN: 8.2 G/DL (ref 12–16)
LYMPHOCYTES # BLD: 2 % (ref 24–44)
MCH RBC QN AUTO: 29.3 PG (ref 26–34)
MCHC RBC AUTO-ENTMCNC: 32.1 G/DL (ref 31–37)
MCV RBC AUTO: 91.2 FL (ref 80–100)
MONOCYTES # BLD: 9 % (ref 1–7)
MORPHOLOGY: ABNORMAL
NUCLEATED RED BLOOD CELLS: 4 PER 100 WBC
PDW BLD-RTO: 15.4 % (ref 11.5–14.9)
PLATELET # BLD: 254 K/UL (ref 150–450)
PMV BLD AUTO: 8.9 FL (ref 6–12)
POTASSIUM SERPL-SCNC: 4.3 MMOL/L (ref 3.7–5.3)
RBC # BLD: 2.67 M/UL (ref 4–5.2)
SEG NEUTROPHILS: 88 % (ref 36–66)
SEGMENTED NEUTROPHILS ABSOLUTE COUNT: 9.05 K/UL (ref 1.3–9.1)
SODIUM BLD-SCNC: 140 MMOL/L (ref 135–144)
WBC # BLD: 10.3 K/UL (ref 3.5–11)

## 2022-03-06 PROCEDURE — 6360000002 HC RX W HCPCS: Performed by: INTERNAL MEDICINE

## 2022-03-06 PROCEDURE — C9113 INJ PANTOPRAZOLE SODIUM, VIA: HCPCS | Performed by: INTERNAL MEDICINE

## 2022-03-06 PROCEDURE — 2580000003 HC RX 258: Performed by: INTERNAL MEDICINE

## 2022-03-06 PROCEDURE — 85025 COMPLETE CBC W/AUTO DIFF WBC: CPT

## 2022-03-06 PROCEDURE — 94640 AIRWAY INHALATION TREATMENT: CPT

## 2022-03-06 PROCEDURE — 36415 COLL VENOUS BLD VENIPUNCTURE: CPT

## 2022-03-06 PROCEDURE — 2060000000 HC ICU INTERMEDIATE R&B

## 2022-03-06 PROCEDURE — 94761 N-INVAS EAR/PLS OXIMETRY MLT: CPT

## 2022-03-06 PROCEDURE — 85014 HEMATOCRIT: CPT

## 2022-03-06 PROCEDURE — 6370000000 HC RX 637 (ALT 250 FOR IP): Performed by: INTERNAL MEDICINE

## 2022-03-06 PROCEDURE — 99232 SBSQ HOSP IP/OBS MODERATE 35: CPT | Performed by: INTERNAL MEDICINE

## 2022-03-06 PROCEDURE — 80048 BASIC METABOLIC PNL TOTAL CA: CPT

## 2022-03-06 PROCEDURE — 99233 SBSQ HOSP IP/OBS HIGH 50: CPT | Performed by: INTERNAL MEDICINE

## 2022-03-06 PROCEDURE — APPSS30 APP SPLIT SHARED TIME 16-30 MINUTES: Performed by: NURSE PRACTITIONER

## 2022-03-06 PROCEDURE — 85018 HEMOGLOBIN: CPT

## 2022-03-06 PROCEDURE — 2700000000 HC OXYGEN THERAPY PER DAY

## 2022-03-06 RX ORDER — QUETIAPINE FUMARATE 50 MG/1
25 TABLET, FILM COATED ORAL NIGHTLY
Status: DISCONTINUED | OUTPATIENT
Start: 2022-03-06 | End: 2022-03-07

## 2022-03-06 RX ORDER — FUROSEMIDE 10 MG/ML
40 INJECTION INTRAMUSCULAR; INTRAVENOUS ONCE
Status: COMPLETED | OUTPATIENT
Start: 2022-03-06 | End: 2022-03-06

## 2022-03-06 RX ADMIN — IPRATROPIUM BROMIDE AND ALBUTEROL SULFATE 1 AMPULE: 2.5; .5 SOLUTION RESPIRATORY (INHALATION) at 19:16

## 2022-03-06 RX ADMIN — FUROSEMIDE 40 MG: 10 INJECTION, SOLUTION INTRAMUSCULAR; INTRAVENOUS at 10:23

## 2022-03-06 RX ADMIN — IRON SUCROSE 200 MG: 20 INJECTION, SOLUTION INTRAVENOUS at 10:45

## 2022-03-06 RX ADMIN — AMLODIPINE BESYLATE 10 MG: 10 TABLET ORAL at 10:23

## 2022-03-06 RX ADMIN — ANTI-FUNGAL POWDER MICONAZOLE NITRATE TALC FREE: 1.42 POWDER TOPICAL at 10:26

## 2022-03-06 RX ADMIN — IPRATROPIUM BROMIDE AND ALBUTEROL SULFATE 1 AMPULE: 2.5; .5 SOLUTION RESPIRATORY (INHALATION) at 11:14

## 2022-03-06 RX ADMIN — IPRATROPIUM BROMIDE AND ALBUTEROL SULFATE 1 AMPULE: 2.5; .5 SOLUTION RESPIRATORY (INHALATION) at 07:02

## 2022-03-06 RX ADMIN — ACETAMINOPHEN 650 MG: 325 TABLET, FILM COATED ORAL at 09:16

## 2022-03-06 RX ADMIN — SODIUM CHLORIDE, PRESERVATIVE FREE 10 ML: 5 INJECTION INTRAVENOUS at 22:01

## 2022-03-06 RX ADMIN — METOPROLOL 50 MG: 50 TABLET ORAL at 21:59

## 2022-03-06 RX ADMIN — IPRATROPIUM BROMIDE AND ALBUTEROL SULFATE 1 AMPULE: 2.5; .5 SOLUTION RESPIRATORY (INHALATION) at 03:16

## 2022-03-06 RX ADMIN — ACETAMINOPHEN 650 MG: 325 TABLET, FILM COATED ORAL at 19:03

## 2022-03-06 RX ADMIN — ANTI-FUNGAL POWDER MICONAZOLE NITRATE TALC FREE: 1.42 POWDER TOPICAL at 22:01

## 2022-03-06 RX ADMIN — CETIRIZINE HYDROCHLORIDE 5 MG: 5 SOLUTION ORAL at 12:17

## 2022-03-06 RX ADMIN — ALLOPURINOL 300 MG: 300 TABLET ORAL at 10:23

## 2022-03-06 RX ADMIN — PANTOPRAZOLE SODIUM 40 MG: 40 INJECTION, POWDER, FOR SOLUTION INTRAVENOUS at 10:24

## 2022-03-06 RX ADMIN — MAGNESIUM OXIDE TAB 400 MG (241.3 MG ELEMENTAL MG) 400 MG: 400 (241.3 MG) TAB at 10:23

## 2022-03-06 RX ADMIN — MULTIPLE VITAMINS W/ MINERALS TAB 1 TABLET: TAB at 10:23

## 2022-03-06 RX ADMIN — SODIUM CHLORIDE, PRESERVATIVE FREE 10 ML: 5 INJECTION INTRAVENOUS at 10:24

## 2022-03-06 RX ADMIN — MAGNESIUM OXIDE TAB 400 MG (241.3 MG ELEMENTAL MG) 400 MG: 400 (241.3 MG) TAB at 21:59

## 2022-03-06 RX ADMIN — BUMETANIDE 1 MG: 1 TABLET ORAL at 10:23

## 2022-03-06 RX ADMIN — QUETIAPINE FUMARATE 25 MG: 50 TABLET ORAL at 22:00

## 2022-03-06 RX ADMIN — ATORVASTATIN CALCIUM 40 MG: 40 TABLET, FILM COATED ORAL at 10:23

## 2022-03-06 RX ADMIN — METOPROLOL 50 MG: 50 TABLET ORAL at 10:23

## 2022-03-06 RX ADMIN — CHLORHEXIDINE GLUCONATE: 213 SOLUTION TOPICAL at 10:25

## 2022-03-06 ASSESSMENT — PAIN DESCRIPTION - PAIN TYPE: TYPE: ACUTE PAIN

## 2022-03-06 ASSESSMENT — PAIN SCALES - GENERAL
PAINLEVEL_OUTOF10: 0
PAINLEVEL_OUTOF10: 4
PAINLEVEL_OUTOF10: 0
PAINLEVEL_OUTOF10: 3
PAINLEVEL_OUTOF10: 3
PAINLEVEL_OUTOF10: 0

## 2022-03-06 ASSESSMENT — PAIN SCALES - WONG BAKER
WONGBAKER_NUMERICALRESPONSE: 0
WONGBAKER_NUMERICALRESPONSE: 0

## 2022-03-06 ASSESSMENT — PAIN DESCRIPTION - LOCATION: LOCATION: HEAD

## 2022-03-06 ASSESSMENT — PAIN DESCRIPTION - DESCRIPTORS: DESCRIPTORS: ACHING

## 2022-03-06 NOTE — PROGRESS NOTES
Gabriel Yusuf MD/Rahul Byrne MD/ Antoinette Cordova MD/Dr Alicia Jay APRN AGACNP-BC, NP-C      Ozzie Mendez APRN NP-C     Quyen Gay APRN NP-C                                           Pulmonary Progress Note    Patient - Jean Jacobs   Age - 66 y.o.   - 1950  MRN - [id number]  Acct # - [de-identified]  Date of Admission - 3/1/2022  9:47 PM    Consulting Service/Physician:       Primary Care Physician: Cally Womack MD    SUBJECTIVE:     Chief Complaint:   Chief Complaint   Patient presents with    Fatigue   Acute hypercapnia  Subjective:    Seems less confused. She remembered me from yesterday. SOB better now on 3L NC. She denies abdominal pain, N/V/D or bleeding. No acute issues overnight. VITALS  /89   Pulse 83   Temp 97.9 °F (36.6 °C) (Oral)   Resp 22   Ht 4' 11\" (1.499 m)   Wt 152 lb 8.9 oz (69.2 kg)   SpO2 97%   BMI 30.81 kg/m²   Wt Readings from Last 3 Encounters:   22 152 lb 8.9 oz (69.2 kg)   22 156 lb 1.4 oz (70.8 kg)   22 143 lb 6.4 oz (65 kg)     I/O (24 Hours)    Intake/Output Summary (Last 24 hours) at 3/6/2022 0824  Last data filed at 3/6/2022 0600  Gross per 24 hour   Intake 445.84 ml   Output 1300 ml   Net -854.16 ml     Ventilator:   Settings  FiO2 : 30 %  Insp Rise Time (%): 1 %  Exam:   Physical Exam   Constitutional:  A&O times 3, obese, no distress  HENT: Unremarkable, NC O2 3L, very [place]  Head: Normocephalic and atraumatic. Eyes: EOM are normal. Pupils are equal, round, and reactive to light. Neck: Neck supple. Thick,+JVD  Cardiovascular:  Regular rate and rhythm. Normal heart tones. No JVD. Pulmonary/Chest: absent BS bases, sternotomy  Abdominal: Soft. Bowel sounds are normal. There is no tenderness. Obese and distended  Musculoskeletal: Normal range of motion. She exhibits no edema or tenderness.    Neurological:patient is alert and oriented to person, place, and time. Skin: Skin is warm and dry. No rash noted.    Extremities: mild leg edema  Infusions:      sodium chloride      sodium chloride       Meds:     Current Facility-Administered Medications:     bumetanide (BUMEX) tablet 1 mg, 1 mg, Oral, Daily, Sharyle Sheen, MD, 1 mg at 03/05/22 1515    ipratropium-albuterol (DUONEB) nebulizer solution 1 ampule, 1 ampule, Inhalation, Q4H, Zahra Angel MD, 1 ampule at 03/06/22 0702    sodium chloride flush 0.9 % injection 5-40 mL, 5-40 mL, IntraVENous, 2 times per day, Cici Alfred MD, 5 mL at 03/05/22 2048    sodium chloride flush 0.9 % injection 10 mL, 10 mL, IntraVENous, PRN, Andrea Connor MD    0.9 % sodium chloride infusion, 25 mL, IntraVENous, PRN, Cici Alfred MD    ondansetron (ZOFRAN-ODT) disintegrating tablet 4 mg, 4 mg, Oral, Q8H PRN **OR** ondansetron (ZOFRAN) injection 4 mg, 4 mg, IntraVENous, Q6H PRN, Cici Alfred MD    magnesium hydroxide (MILK OF MAGNESIA) 400 MG/5ML suspension 30 mL, 30 mL, Oral, Daily PRN, Cici Alfred MD    acetaminophen (TYLENOL) tablet 650 mg, 650 mg, Oral, Q6H PRN, 650 mg at 03/05/22 2052 **OR** acetaminophen (TYLENOL) suppository 650 mg, 650 mg, Rectal, Q6H PRN, Cici Alfred MD    albuterol (PROVENTIL) nebulizer solution 2.5 mg, 2.5 mg, Nebulization, Q6H PRN, Andrea Connor MD, 2.5 mg at 03/03/22 1200    allopurinol (ZYLOPRIM) tablet 300 mg, 300 mg, Oral, Daily, Andrea Connor MD, 300 mg at 03/05/22 1234    amLODIPine (NORVASC) tablet 10 mg, 10 mg, Oral, Daily, Andrea Connor MD, 10 mg at 03/05/22 1234    atorvastatin (LIPITOR) tablet 40 mg, 40 mg, Oral, Daily, Andrea Connor MD, 40 mg at 03/05/22 1234    chlorhexidine (HIBICLENS) 4 % liquid, , Topical, Daily, Andrea Connor MD, Given at 03/05/22 1041    [Held by provider] clopidogrel (PLAVIX) tablet 75 mg, 75 mg, Oral, Daily, Bianca Tanner APRN - CNP    magnesium oxide (MAG-OX) tablet 400 mg, 400 mg, Oral, BID, Andrea Aggie Gambino MD, 400 mg at 03/05/22 2047    metoprolol tartrate (LOPRESSOR) tablet 50 mg, 50 mg, Oral, BID, Deacon Hollingsworth MD, 50 mg at 03/05/22 2047    miconazole (MICOTIN) 2 % powder, , Topical, BID, Deacon Hollingsworth MD, Given at 03/05/22 2047    therapeutic multivitamin-minerals 1 tablet, 1 tablet, Oral, Daily, Deacon Hollingsworth MD, 1 tablet at 03/05/22 1234    pantoprazole (PROTONIX) injection 40 mg, 40 mg, IntraVENous, Daily, 40 mg at 03/05/22 1039 **AND** sodium chloride flush 0.9 % injection 10 mL, 10 mL, IntraVENous, Daily, Andrea Connor MD, 10 mL at 03/05/22 1040    0.9 % sodium chloride infusion, , IntraVENous, PRN, Deacon Hollingsworth MD    cetirizine HCl (ZYRTEC) 5 MG/5ML solution 5 mg, 5 mg, Oral, Daily, Andrea Connor MD, 5 mg at 03/05/22 1239    iron sucrose (VENOFER) 200 mg in sodium chloride 0.9 % 100 mL IVPB, 200 mg, IntraVENous, Q24H, Deacon Hollingsworth MD, Stopped at 03/05/22 1144    Lab Results:     Lab Results   Component Value Date    WBC 10.3 03/06/2022    HGB 7.8 (L) 03/06/2022    HCT 24.4 (L) 03/06/2022    MCV 91.2 03/06/2022     03/06/2022     Lab Results   Component Value Date    CALCIUM 8.0 (L) 03/06/2022     03/06/2022    K 4.3 03/06/2022    CO2 21 03/06/2022     03/06/2022    BUN 68 (H) 03/06/2022    CREATININE 1.69 (H) 03/06/2022     No components found for: ABGSAMPLETYP, ABGBODYTEMP, ABGPHCORRFOR, [id number], ABGPHCORRFOOR, ABGPH, ABGPCO2, ABGPO2, ABGBASEEXCES, ABGBASEDEFIC, ABGHCO3, AKHW9UHJ, ABGENDTIDALC, ABGALLENSTES, ABGSPO2, ABGSAMEPLESIT, [id number], ABGOXYGENSOU  Lab Results   Component Value Date    INR 1.3 02/06/2022    PROTIME 16.0 (H) 02/06/2022       Radiology:   [unfilled]  My reading of film: CXR still shows chf with bilateral effusions R>L      ASSESSMENT:       1. Acute hypercapnic respiratory failure-improved, LOC much better  2. Hypoxia-not normally on home O2  3. A on C diastolic CHF-Dr Melton  4. CAD-history of CABG [facility] [place]  5. Mild AS  6.  Mild to mod AR  7. COPD? Uses inhaler, no pulmonologist, no PFT, smoked for 30+ years quitting in 2000  8. CKDz III  9. [place]  10. A fib-eliquis held for anemia  11. DM2  12. AZIZA? Never tested but does snore and nap  13. Anemia-GI source eval underway, Hgb [place] 4.4 s/p transfusion, now HgB 7.8, no active signs of bleeding now  14. Full code  PLAN:   1. Lasix one dose now  2. Await Consult cardiology per family request  3. Wean off O2  4. Thoracentesis if effusions don't respond to diuresis>>CXR in AM  5. Outpatient PFT/PSG/pulmonary follow up recommended  6. GI workup for anemia EGD planned Monday    OK transfer out of ICU from our perspective. She looks clinically much better. Electronically signed by Carlos Arias MD on 03/06/22     This progress note was completed using a voice transcription system. Every effort was made to ensure accuracy. However, inadvertent computerized transcription errors may be present.     Michelle Jiménez MD  Pulmonary and Critical Care

## 2022-03-06 NOTE — PROGRESS NOTES
Department of Internal Medicine  Nephrology Manuel Leyva MD   progress note    Reason for consultation: Management of acute kidney injury superimposed on chronic kidney disease stage III. Subjective/interval history:    Patient seen and examined in ICU  Patient is awake and responsive off BiPAP  Serum creatinine stable at 1.6 mg/dL  Urine output noted with external catheter 1.3 L urine output  Hemoglobin improved    History of present illness: This is a 66 y.o. female with a significant past medical history of Heart Failure with reduced ejection fraction [HFrEF with LVEF 45 to 50% in July 2020 secondary to ischemic cardiomyopathy], Coronary artery disease, Meningioma, type 2 diabetes mellitus [with nonproliferative diabetic retinopathy], essential hypertension, bilateral deafness and chronic kidney disease stage III [baseline serum creatinine 1.6 mg/dL and follows up with Dr. Saintclair Mate of renal services of [place] whom she saw 1 week ago and was switched from Lasix to Bumex], presented with complaints of fatigue and dyspnea with exertion progressively worsened over 2 weeks. She had been recently admitted and seen by us at [facility] on 2/11/2022. Laboratory studies at presentation however were remarkable for hemoglobin 7.4 g/dL and BUN/creatinine 110/2.11 mg/dL. She denies hemoptysis or melena stools. However, hemoglobin dropped overnight to 4.4 g/dL and she is scheduled for endoscopy tomorrow.     Latex, Aleve [naproxen sodium], Pioglitazone, Claritin [loratadine], Keflex [cephalexin], and Lisinopril    Past Medical History:   Diagnosis Date    Acute CVA (cerebrovascular accident) ([facility] [address] 7/25/2020    Altered mental status 5/26/2021    Arthritis     Brain mass     Cellulitis and abscess     Cellulitis and abscess of unspecified site     Cellulitis of both lower extremities 5/26/2021    Cellulitis of left lower extremity 7/26/2020    Cellulitis of lower extremity 10/4/2020    CHF (congestive heart failure) (HCC)     Chronic kidney disease, unspecified     Coronary atherosclerosis of native coronary artery     Essential hypertension 7/25/2020    Essential hypertension, malignant     Hallucinations 2/18/2021    Hard of hearing     Head contusion 9/9/2020    Hypokalemia 9/9/2020    Iron deficiency anemia, unspecified     Meningioma ([facility] [address] 2/25/2021    Myocardial infarction ([facility]     Other and unspecified hyperlipidemia     Pure hypercholesterolemia     Toxic metabolic encephalopathy 9/47/0031    Type II or unspecified type diabetes mellitus without mention of complication, not stated as uncontrolled     Unspecified asthma(493.90)     Unspecified venous (peripheral) insufficiency     Unspecified vitamin D deficiency     UTI (urinary tract infection) 2/18/2021       Scheduled Meds:   furosemide  40 mg IntraVENous Once    bumetanide  1 mg Oral Daily    ipratropium-albuterol  1 ampule Inhalation Q4H    sodium chloride flush  5-40 mL IntraVENous 2 times per day    allopurinol  300 mg Oral Daily    amLODIPine  10 mg Oral Daily    atorvastatin  40 mg Oral Daily    chlorhexidine   Topical Daily    [Held by provider] clopidogrel  75 mg Oral Daily    magnesium oxide  400 mg Oral BID    metoprolol tartrate  50 mg Oral BID    miconazole   Topical BID    therapeutic multivitamin-minerals  1 tablet Oral Daily    pantoprazole  40 mg IntraVENous Daily    And    sodium chloride flush  10 mL IntraVENous Daily    cetirizine HCl  5 mg Oral Daily    iron sucrose  200 mg IntraVENous Q24H     Continuous Infusions:   sodium chloride      sodium chloride       PRN Meds:.sodium chloride flush, sodium chloride, ondansetron **OR** ondansetron, magnesium hydroxide, acetaminophen **OR** acetaminophen, albuterol, sodium chloride        Physical Exam:    VITALS:  BP (!) 153/31   Pulse 90   Temp 98.1 °F (36.7 °C) (Temporal)   Resp 22   Ht 4' 11\" (1.499 m)   Wt 152 lb 8.9 oz (69.2 kg)   SpO2 94%   BMI 30.81 kg/m²     Constitutional: alert, appears stated age and cooperative on BiPAP    Skin: Skin color, texture, turgor normal. No rashes or lesions    Head: Normocephalic, without obvious abnormality, atraumatic     Cardiovascular/Edema :regular rate and rhythm    Respiratory: Lungs: Bilateral chest rise, on BiPAP    Abdomen: Soft nontender    Back: symmetric, no curvature. ROM normal. No CVA tenderness. Extremities: 1+ edema bilaterally    Neuro:  Grossly normal      CBC:   Recent Labs     03/04/22  1417 03/04/22  2317 03/05/22  0524 03/05/22  1410 03/05/22  2122 03/06/22  0131 03/06/22  0421   WBC 10.7  --  5.9  --   --   --  10.3   HGB 7.7*   < > 6.9*   < > 7.9* 7.7* 7.8*     --  189  --   --   --  254    < > = values in this interval not displayed. BMP:    Recent Labs     03/04/22  1417 03/05/22  0524 03/06/22  0421    142 140   K 4.4 4.1 4.3    111* 106   CO2 20 20 21   BUN 71* 71* 68*   CREATININE 1.73* 1.73* 1.69*   GLUCOSE 93 79 142*       Lab Results   Component Value Date    NITRU NEGATIVE 02/22/2022    COLORU Yellow 02/22/2022    PHUR 5.0 02/22/2022    WBCUA 10 TO 20 02/22/2022    RBCUA TOO NUMEROUS TO COUNT 02/22/2022    MUCUS NOT REPORTED 02/07/2022    TRICHOMONAS NOT REPORTED 02/07/2022    YEAST NOT REPORTED 02/07/2022    BACTERIA None 02/22/2022    SPECGRAV 1.016 02/22/2022    LEUKOCYTESUR NEGATIVE 02/22/2022    UROBILINOGEN Normal 02/22/2022    BILIRUBINUR NEGATIVE 02/22/2022    GLUCOSEU TRACE 02/22/2022    KETUA NEGATIVE 02/22/2022    AMORPHOUS NOT REPORTED 02/07/2022     Urine Protein:     Lab Results   Component Value Date     10/27/2020     Urine Creatinine:     Lab Results   Component Value Date    LABCREA 26.2 05/26/2021     IMPRESSION/RECOMMENDATIONS:      1. Acute kidney injury superimposed on chronic kidney disease stage III most consistent with prerenal azotemia led to dehydration and anemia. Serum creatinine stable      2. Systemic hypertension controlled. 3.  Acute on chronic anemia - rule out GI bleed. Plavix is on hold. 4.  CHF with preserved EF moderate left ventricular hypertrophy diastolic dysfunction, mild to moderate aortic regurgitation, proBNP increased from 5,281--11,561    Plan  KVO IV fluids  Continue Bumex  Prognosis is guarded. Thank you very much for the courtesy and confidence of this consultation.     Griselda Mowers, MD   Attending Nephrologist  3/6/2022 8:41 AM

## 2022-03-06 NOTE — PROGRESS NOTES
[facility]    Date:   3/5/2022  Patient name:  Derek Rosales  Date of admission:  3/1/2022  9:47 PM  MRN:   [id number]  YOB: 1950      HPI        Overnight no fever chills loss of appetite positive for dyspnea on rest on nasal cannula  REVIEW OF SYSTEMS:    · Cardiovascular: Negative for lightheadedness/orthostatic symptoms ,chest pain, dyspnea on exertion, palpitations or loss of consciousness. · Respiratory: Positive for dyspnea on rest  · Gastrointestinal: Negative for nausea/vomiting, change in bowel habits, abdominal pain, dysphagia/appetite loss, hematemesis, blood in stools. OBJECTIVE:    BP (!) 157/66   Pulse 100   Temp 97.6 °F (36.4 °C) (Oral)   Resp 21   Ht 4' 11\" (1.499 m)   Wt 152 lb 12.5 oz (69.3 kg)   SpO2 96%   BMI 30.86 kg/m²    On nasal cannula oxygen look dyspneic  · Lungs: Bibasal crackles  · Heart: regular rate and rhythm, S1, S2 normal, no murmur, click, rub or gallop  · Abdomen: soft, non-tender; bowel sounds normal; no masses,  no organomegaly  · Extremities: extremities normal, atraumatic, no cyanosis or edema  · Neurological:  Reflexes normal and symmetric. Sensation grossly normal  · Skin - no rash, no lump   · Eye- no icterus no redness  · GI-oral mucosa moist,  · Lymphatic system-no lymphadenopathy no splenomegaly  · Mouth- mucous membrane moist  · Head-normocephalic atraumatic  · Neck- supple no carotid bruit,Thyroid not palpable.       Past Medical History:   has a past medical history of Acute CVA (cerebrovascular accident) ([address] Altered mental status, Arthritis, Brain mass, Cellulitis and abscess, Cellulitis and abscess of unspecified site, Cellulitis of both lower extremities, Cellulitis of left lower extremity, Cellulitis of lower extremity, CHF (congestive heart failure) ([address] Chronic kidney disease, unspecified, Coronary atherosclerosis of native coronary artery, tablet by mouth daily 2/15/22  Yes Ortega Garcia MD   desloratadine (CLARINEX) 5 MG tablet Take 1 tablet by mouth once daily 2/15/22  Yes Ortega Garcia MD   magnesium oxide (MAG-OX) 400 (241.3 Mg) MG TABS tablet Take 1 tablet by mouth twice daily 2/15/22  Yes Ortega Garcia MD   metoprolol tartrate (LOPRESSOR) 50 MG tablet Take 1 tablet by mouth 2 times daily 2/15/22  Yes Ortega Garcia MD   miconazole (MICOTIN) 2 % powder Apply topically 2 times daily UNDER THE SKIN FOLDS LONG TERM 2/15/22  Yes Ortega Garcia MD   mupirocin (BACTROBAN) 2 % cream Apply 2 times dailY X 10 DAYS ON OPEN BLISTERS ON THE LEGS. 2/15/22 3/17/22 Yes Ortega Garcia MD   Multiple Vitamins-Minerals (THERAPEUTIC MULTIVITAMIN-MINERALS) tablet Take 1 tablet by mouth daily 2/15/22  Yes Ortega Garcia MD   colchicine (COLCRYS) 0.6 MG tablet Take 2 tablets now, then 1 tablet one hour later. Wait 12 hours then start 1 tablet bid for 5 days. Patient taking differently: Take 0.6 mg by mouth daily Take 2 tablets now, then 1 tablet one hour later. Wait 12 hours then start 1 tablet bid for 5 days. 2/15/22  Yes Ortega Garcia MD   Nebulizers (COMPRESSOR/NEBULIZER) MISC Nebulizer with compressor. Disposable Med Nebs 2 /month. Reusable Med Nebs 1 per 6 months. Aerosol Mask 1 per month. Replacement Filters 2 per month. All other related supplies as needed per month. Medications being used: Albuterol . 083 unit dose vial. Frequency: every 6 hrs x 4/day . Length of Need: 1 2/22/22   Osman Leos MD   albuterol (PROVENTIL) (2.5 MG/3ML) 0.083% nebulizer solution Take 3 mLs by nebulization every 6 hours as needed for Wheezing or Shortness of Breath 2/22/22   Osman Leos MD   [employer] [facility] [employer].  Devices (ADJUST FOLD CANE/YORK HANDLE) MISC Use daily for walking 2/22/22   Osman Leos MD   Handicap Placard MISC by Does not apply route Expires on 12/30/25 2/22/22   Osman Leos MD   FreeStyle Lancets MISC 1 each by Does not apply route daily Patient needs to contact office before any further refills will be approved 2/15/22   Nicolás Shaw MD   Bismuth Tribromoph-Petrolatum (XEROFORM PETROLATUM PATCH 2\"X2\") PADS external pads Apply 1 each topically daily 12/10/21   MAL Moreau - CNP   blood glucose test strips (FREESTYLE LITE) strip 1 each by In Vitro route daily As needed. 3/19/21   Nicolás Shaw MD   Blood Pressure KIT 1 kit by Does not apply route three times daily 12/19/19   Kiarra Melendez MD   blood glucose monitor kit and supplies 1 kit by Other route three times daily Dispense Butterfly Elite CBG Device 8/20/19   Jose Miguel Araujo MD       Allergies:  Latex, Aleve [naproxen sodium], Pioglitazone, Claritin [loratadine], Keflex [cephalexin], and Lisinopril    Social History:   reports that she quit smoking about 22 years ago. She has a 2.50 pack-year smoking history. She has never used smokeless tobacco. She reports previous alcohol use. She reports that she does not use drugs. Family History: family history includes Diabetes in her maternal grandmother, mother, and sister; Heart Disease in her father and mother; High Blood Pressure in her sister. Laboratory Testing:  CBC:   Recent Labs     03/05/22 0524 03/05/22 0524 03/05/22  1410   WBC 5.9  --   --    HGB 6.9*   < > 7.8*     --   --     < > = values in this interval not displayed. BMP:    Recent Labs     03/03/22  0444 03/03/22  0444 03/04/22  1417 03/04/22  1417 03/05/22 0524      < > 139   < > 142   K 4.0   < > 4.4   < > 4.1      < > 106   < > 111*   CO2 22   < > 20   < > 20   BUN 92*   < > 71*   < > 71*   CREATININE 1.80*  --  1.73*  --  1.73*   GLUCOSE 103*   < > 93   < > 79    < > = values in this interval not displayed. S. Calcium:  Recent Labs     03/05/22 0524   CALCIUM 8.2*     S. Ionized Calcium:No results for input(s): IONCA in the last 72 hours.   S. Magnesium:  No results for input(s): MG in the last 72 hours.  S. Phosphorus:No results for input(s): PHOS in the last 72 hours. S. Glucose:  Recent Labs     03/03/22  1950/22  0928 03/04/22  1137   POCGLU 107* 87 83     Glycosylated hemoglobin A1C: No results for input(s): LABA1C in the last 72 hours. INR: No results for input(s): INR in the last 72 hours. Hepatic functions:   Recent Labs     03/05/22  0524   ALKPHOS 184*   ALT 25   AST 40*   PROT 4.3*   BILITOT 0.29*   BILIDIR 0.13   LABALBU 2.3*     Pancreatic functions:  Recent Labs     03/04/22  1430   LACTA 0.7     S. Lactic Acid:   Recent Labs     03/04/22  1430   LACTA 0.7     Cardiac enzymes:No results for input(s): CKTOTAL, CKMB, CKMBINDEX, TROPONINI in the last 72 hours. BNP:No results for input(s): BNP in the last 72 hours. Lipid profile: No results for input(s): CHOL, TRIG, HDL, LDL, LDLCALC in the last 72 hours. Blood Gases: No results found for: PH, PCO2, PO2, HCO3, O2SAT  Thyroid functions:   Lab Results   Component Value Date    TSH 2.60 12/06/2021        Imaging/Diagonstics:  EKG: Normal sinus rhythm    CXR: No acute cardiopulmonary findings. ASSESSMENT:    Patient Active Problem List   Diagnosis    Vitamin D deficiency    Venous insufficiency of both lower extremities    Asthma    Type 2 diabetes mellitus with stage 3 chronic kidney disease, without long-term current use of insulin ([facility] [address]    Coronary artery disease involving native coronary artery of native heart without angina pectoris    Iron deficiency anemia    Stage 3 chronic kidney disease ([facility] [address] [facility]    Colonoscopy refused    Pneumococcal vaccination declined    Impaired hearing    Chronic diastolic CHF (congestive heart failure) ([facility] [address]    COPD (chronic obstructive pulmonary disease) ([facility] [address]    HTN.  Benign hypertension with CKD (chronic kidney disease) stage III (HCC)    Gout with tophi    Hyperlipidemia with target LDL less than 70    Dry skin dermatitis HANDS    Meningioma, cerebral (HCC)    [facility]oxysmal atrial fibrillation ([facility] [address]    Influenza vaccination declined    Recurrent UTI    Elevated LFTs    Hypomagnesemia    Kidney stones    Diverticulosis    COVID-19 vaccination declined    Acute kidney injury ([facility] [address]    Acute on chronic combined systolic (congestive) and diastolic (congestive) heart failure (HCC)    Chronic a-fib (HCC)    Chronic venous stasis dermatitis of both lower extremities    Symptomatic anemia    Family history of colon cancer    Family history of pancreatic cancer       PLAN:  79-year-old lady with history of atrial fibrillation history of coronary disease stent placed 20 years ago on Eliquis and Plavix 75 baseline hemoglobin was 10 2 weeks ago admitted with gradual increase of fatigue dyspnea exertion sleepy most part of the day no fever no vomiting blood no blood in the stool no dark stools no diarrhea hemoglobin ER was 4.4 suspected GI bleed Eliquis and Plavix held n.p.o. for now upper and lower endoscopies GI consulted  IV Venofer 200 mg daily  1 unit of PRBC given hemoglobin improved to 6 will need another unit of PRBC  Ckd,nephro consult,pt was due to see dr Portia Alvarenga for ckd  March 3  dyspne on rest  On fluids  cxr   bnp  Hx of chf  On bowel prep for egd colon  Iv venofer  Hg stable at 7  Dc fluids  March 4  Case discussed with gi on floor  Procedure abandoned due to  Pt delirious  Did not sleep all night due to bowel prep  No focal deficit  Family in room  Ct head stat non contrast  cxr  Neuro consult  poc 252   with mild acidosis resp  Will consult plum  May need bipap  No sedatives or pain meds  Off fluids  Acute metabolic encephalopathy due to co2 narcosis  March 5  Patient seen in ICU  Nonverbal continue with the nasal cannula  Pulmonary wise stable plan for EGD Monday      Niki Mcconnell MD, MD [facility] [address], [address] [place] [address].    Phone [phone]   Fax: [phone]  Answering Service: [phone]

## 2022-03-06 NOTE — CARE COORDINATION
ONGOING DISCHARGE PLAN:    Discharge plan for the patient is home with son and daughter Gustavo Marquez calls her throughout the day and is checking in on her.  Clarisa Latif stated yesterday they refuse SNF as they do not have coverage for that and they refuse VNS as they do not want anyone in the home.     IV iron and last H&H was 7.8 and 24.4. Anticipate GI workup. Will continue to follow for additional discharge needs.     Electronically signed by Joaquín Rutherford RN on 3/6/2022 at 11:29 AM

## 2022-03-06 NOTE — CONSULTS
[facility]   Consult Note         Today's Date: 3/6/2022  Patient Name: Freddy Crow  Date of admission: 3/1/2022  9:47 PM  Patient's age: 66 y.o., 1950  Admission Dx: Acute GI bleeding [K92.2]  Symptomatic anemia [D64.9]    Reason for Consult:  Cardiac evaluation    Requesting Physician: Bebeto Desouza MD    REASON FOR CONSULT:  CHF, CAD    History Obtained From:  Patient, chart, staff, records    HISTORY OF PRESENT ILLNESS:      The patient is a 66 y.o. female who is admitted to the hospital for SOB, fatigue. Past Medical History:   has a past medical history of Acute CVA (cerebrovascular accident) ([address] Altered mental status, Arthritis, Brain mass, Cellulitis and abscess, Cellulitis and abscess of unspecified site, Cellulitis of both lower extremities, Cellulitis of left lower extremity, Cellulitis of lower extremity, CHF (congestive heart failure) ([address] Chronic kidney disease, unspecified, Coronary atherosclerosis of native coronary artery, Essential hypertension, Essential hypertension, malignant, Hallucinations, Hard of hearing, Head contusion, Hypokalemia, Iron deficiency anemia, unspecified, Meningioma ([address] Myocardial infarction ([address] Other and unspecified hyperlipidemia, Pure hypercholesterolemia, Toxic metabolic encephalopathy, Type II or unspecified type diabetes mellitus without mention of complication, not stated as uncontrolled, Unspecified asthma(493.90), Unspecified venous (peripheral) insufficiency, Unspecified vitamin D deficiency, and UTI (urinary tract infection). Past Surgical History:   has a past surgical history that includes Tonsillectomy and adenoidectomy and Coronary artery bypass graft. Home Medications:    Prior to Admission medications    Medication Sig Start Date End Date Taking?  Authorizing Provider   vitamin D (ERGOCALCIFEROL) 1.25 MG (32559 UT) CAPS capsule Take 1 capsule by mouth once a week  Patient taking differently: Take 50,000 Units by mouth once a week Sundays 3/1/22  Yes Denia Yang MD   albuterol sulfate HFA (PROAIR HFA) 108 (90 Base) MCG/ACT inhaler Inhale 2 puffs into the lungs every 6 hours as needed for Wheezing or Shortness of Breath (cough) 2/22/22  Yes Kristie Carpenter MD   apixaban (ELIQUIS) 5 MG TABS tablet Take 1 tablet by mouth 2 times daily 2/15/22  Yes Denia Yang MD   allopurinol (ZYLOPRIM) 300 MG tablet Take 1 tablet by mouth daily Per rheumatologist 2/15/22  Yes Denia Yang MD   amLODIPine (NORVASC) 10 MG tablet Take 1 tablet by mouth daily FOR GOUT 2/15/22  Yes Denia Yang MD   atorvastatin (LIPITOR) 40 MG tablet Take 1 tablet by mouth once daily 2/15/22  Yes Denia Yang MD   bumetanide (BUMEX) 1 MG tablet Take 1 tablet by mouth 2 times daily 2/15/22  Yes Denia Yang MD   chlorhexidine (HIBICLENS) 4 % external liquid for decontamination AND [address] LEGS EVERY DAY 2/15/22  Yes Denia Yang MD   clopidogrel (PLAVIX) 75 MG tablet Take 1 tablet by mouth daily 2/15/22  Yes Denia Yang MD   desloratadine (CLARINEX) 5 MG tablet Take 1 tablet by mouth once daily 2/15/22  Yes Denia Yang MD   magnesium oxide (MAG-OX) 400 (241.3 Mg) MG TABS tablet Take 1 tablet by mouth twice daily 2/15/22  Yes Denia Yang MD   metoprolol tartrate (LOPRESSOR) 50 MG tablet Take 1 tablet by mouth 2 times daily 2/15/22  Yes Denia Yang MD   miconazole (MICOTIN) 2 % powder Apply topically 2 times daily UNDER THE SKIN FOLDS LONG TERM 2/15/22  Yes Denia Yang MD   mupirocin (BACTROBAN) 2 % cream Apply 2 times dailY X 10 DAYS ON OPEN BLISTERS ON THE LEGS. 2/15/22 3/17/22 Yes Denia Yang MD   Multiple Vitamins-Minerals (THERAPEUTIC MULTIVITAMIN-MINERALS) tablet Take 1 tablet by mouth daily 2/15/22  Yes Denia Yang MD   colchicine (COLCRYS) 0.6 MG tablet Take 2 tablets now, then 1 tablet one hour later.  Wait 12 hours then start 1 tablet bid for 5 days.  Patient taking differently: Take 0.6 mg by mouth daily Take 2 tablets now, then 1 tablet one hour later. Wait 12 hours then start 1 tablet bid for 5 days. 2/15/22  Yes Higinio Edward MD   Nebulizers (COMPRESSOR/NEBULIZER) MISC Nebulizer with compressor. Disposable Med Nebs 2 /month. Reusable Med Nebs 1 per 6 months. Aerosol Mask 1 per month. Replacement Filters 2 per month. All other related supplies as needed per month. Medications being used: Albuterol . 083 unit dose vial. Frequency: every 6 hrs x 4/day . Length of Need: 1 2/22/22   Robin Frederick MD   albuterol (PROVENTIL) (2.5 MG/3ML) 0.083% nebulizer solution Take 3 mLs by nebulization every 6 hours as needed for Wheezing or Shortness of Breath 2/22/22   Robin Frederick MD   [facility]. Devices (ADJUST FOLD CANE/YORK HANDLE) MISC Use daily for walking 2/22/22   Robin Frederick MD   Handicap Placard MISC by Does not apply route Expires on 12/30/25 2/22/22   Robin Frederick MD   FreeStyle Lancets MISC 1 each by Does not apply route daily Patient needs to contact office before any further refills will be approved 2/15/22   Higinio Edward MD   Bismuth Tribromoph-Petrolatum (XEROFORM PETROLATUM PATCH 2\"X2\") PADS external pads Apply 1 each topically daily 12/10/21   Ruby Skelton, APRN - CNP   blood glucose test strips (FREESTYLE LITE) strip 1 each by In Vitro route daily As needed.  3/19/21   Higinio Edward MD   Blood Pressure KIT 1 kit by Does not apply route three times daily 12/19/19   Kaylene Vargas MD   blood glucose monitor kit and supplies 1 kit by Other route three times daily Dispense Butterfly Elite CBG Device 8/20/19   Niurka Schultz MD       bumetanide, 1 mg, Oral, Daily    ipratropium-albuterol, 1 ampule, Inhalation, Q4H    sodium chloride flush, 5-40 mL, IntraVENous, 2 times per day    allopurinol, 300 mg, Oral, Daily    amLODIPine, 10 mg, Oral, Daily    atorvastatin, 40 mg, Oral, Daily    chlorhexidine, , Topical, Daily   [Held by provider] clopidogrel, 75 mg, Oral, Daily    magnesium oxide, 400 mg, Oral, BID    metoprolol tartrate, 50 mg, Oral, BID    miconazole, , Topical, BID    therapeutic multivitamin-minerals, 1 tablet, Oral, Daily    pantoprazole, 40 mg, IntraVENous, Daily **AND** sodium chloride flush, 10 mL, IntraVENous, Daily    cetirizine HCl, 5 mg, Oral, Daily    iron sucrose, 200 mg, IntraVENous, Q24H      Allergies:  Latex, Aleve [naproxen sodium], Pioglitazone, Claritin [loratadine], Keflex [cephalexin], and Lisinopril    Social History:   reports that she quit smoking about 22 years ago. She has a 2.50 pack-year smoking history. She has never used smokeless tobacco. She reports previous alcohol use. She reports that she does not use drugs. Family History: family history includes Diabetes in her maternal grandmother, mother, and sister; Heart Disease in her father and mother; High Blood Pressure in her sister. No h/o sudden cardiac death. REVIEW OF SYSTEMS:    · Constitutional: there has been no unanticipated weight loss. There's been No change in energy level, No change in activity level. · Eyes: No visual changes or diplopia. No scleral icterus. · ENT: No Headaches, hearing loss or vertigo. No mouth sores or sore throat. · Cardiovascular: per HPI  · Respiratory: per HPI  · Gastrointestinal: No abdominal pain, appetite loss, blood in stools. No change in bowel or bladder habits. · Genitourinary: No dysuria, trouble voiding, or hematuria. · Musculoskeletal:  No gait disturbance, No weakness or joint complaints. · Integumentary: No rash or pruritis. · Neurological: No headache, diplopia, change in muscle strength, numbness or tingling. No change in gait, balance, coordination, mood, affect, memory, mentation, behavior. · Psychiatric: No anxiety, or depression. · Endocrine: No temperature intolerance. No excessive thirst, fluid intake, or urination. No tremor.   · Hematologic/Lymphatic: No abnormal bruising or bleeding, blood clots or swollen lymph nodes. · Allergic/Immunologic: No nasal congestion or hives. PHYSICAL EXAM:      BP (!) 153/31   Pulse 90   Temp 98.1 °F (36.7 °C) (Temporal)   Resp 22   Ht 4' 11\" (1.499 m)   Wt 152 lb 8.9 oz (69.2 kg)   SpO2 94%   BMI 30.81 kg/m²    Constitutional and General Appearance: alert, cooperative, no distress and appears stated age  HEENT: PERRL, no cervical lymphadenopathy. No masses palpable. Normal oral mucosa  Respiratory:  · Normal excursion and expansion without use of accessory muscles  · Resp Auscultation: Good respiratory effort. No for increased work of breathing. On auscultation: clear to auscultation bilaterally  Cardiovascular:  · The apical impulse is not displaced  · Heart tones are crisp and normal. regular S1 and S2.  · Jugular venous pulsation Normal  · The carotid upstroke is normal in amplitude and contour without delay or bruit  · Peripheral pulses are symmetrical and full   Abdomen:   · No masses or tenderness  · Bowel sounds present  Extremities:  ·  No Cyanosis or Clubbing  ·  Lower extremity edema: No  ·  Skin: Warm and dry  Neurological:  · Alert and oriented. · Moves all extremities well  · No abnormalities of mood, affect, memory, mentation, or behavior are noted        EKG:    Date: 03/06/22  Reading: No acute ischemia      LAST ECHO:  Date:  Findings Summary:      LAST Stress Test:   Date of last ST:  Major Findings:    LAST Cardiac Angiography:.  Date:  Findings:      Labs:     CBC:   Recent Labs     03/05/22 0524 03/05/22  1410 03/06/22  0131 03/06/22  0421   WBC 5.9  --   --  10.3   HGB 6.9*   < > 7.7* 7.8*   HCT 22.0*   < > 24.5* 24.4*     --   --  254    < > = values in this interval not displayed.      BMP:   Recent Labs     03/05/22  0524 03/06/22  0421    140   K 4.1 4.3   CO2 20 21   BUN 71* 68*   CREATININE 1.73* 1.69*   LABGLOM 29* 30*   GLUCOSE 79 142*     BNP: No results for input(s): BNP in the last 72 hours. PT/INR: No results for input(s): PROTIME, INR in the last 72 hours. APTT:No results for input(s): APTT in the last 72 hours. CARDIAC ENZYMES:No results for input(s): CKTOTAL, CKMB, CKMBINDEX, TROPONINI in the last 72 hours. FASTING LIPID PANEL:  Lab Results   Component Value Date    HDL 59 12/06/2021    TRIG 132 12/06/2021     LIVER PROFILE:  Recent Labs     03/05/22  0524   AST 40*   ALT 25   LABALBU 2.3*     Troponins: Invalid input(s): TROPONIN     Other Current Problems  Patient Active Problem List   Diagnosis    Vitamin D deficiency    Venous insufficiency of both lower extremities    Asthma    Type 2 diabetes mellitus with stage 3 chronic kidney disease, without long-term current use of insulin ([facility]    Coronary artery disease involving native coronary artery of native heart without angina pectoris    Iron deficiency anemia    Stage 3 chronic kidney disease ([facility]    Colonoscopy refused    Pneumococcal vaccination declined    Impaired hearing    Chronic diastolic CHF (congestive heart failure) ([facility]    COPD (chronic obstructive pulmonary disease) ([facility]    HTN. Benign hypertension with CKD (chronic kidney disease) stage III (HCC)    Gout with tophi    Hyperlipidemia with target LDL less than 70    Dry skin dermatitis HANDS    Meningioma, cerebral (HCC)    Paroxysmal atrial fibrillation (HCC)    Influenza vaccination declined    Recurrent UTI    Elevated LFTs    Hypomagnesemia    Kidney stones    Diverticulosis    COVID-19 vaccination declined    Acute kidney injury ([facility]    Acute on chronic combined systolic (congestive) and diastolic (congestive) heart failure (HCC)    Chronic a-fib (HCC)    Chronic venous stasis dermatitis of both lower extremities    Symptomatic anemia    Family history of colon cancer    Family history of pancreatic cancer     Echo: 2/7/22  Normal left ventricle size and function with an estimated EF > 55%.   No segmental wall motion abnormalities seen. Moderate left ventricular hypertrophy. Normal right ventricular size and function. Left atrial dilatation. Aortic valve is trileaflet. Mild aortic stenosis. Mild-moderate aortic insufficiency. Normal aortic root dimension. Mitral annular calcification is seen. Mild mitral regurgitation. Mild tricuspid regurgitation. Normal right ventricular systolic pressure. No significant pericardial effusion is seen. Pleural effusion present. IMPRESSION & Recommendations:      Hx of diastolic heart failure- stable  Acute GI bleed- resolved. Hold all AP and NOACs meds. CAD- stable  Afib-stable  Mild AS- stable  Mild to Mod AI  UTI- per primary team  COPD  CKD  DM2  AZIZA    Discussed with patient, family, and Nurse. Electronically signed by Verna Gonzalez DO on 3/6/2022 at 11:44 AM    Verna Gonzalez, [id number] [facility]  [facility] [employer]  [phone]

## 2022-03-06 NOTE — PROGRESS NOTES
GI Progress notes    3/6/2022   12:14 PM    Name:  Shelly Fields  MRN:    [id number]     Acct:     [de-identified]   Room:  2005/2005-01  IP Day: 4     Admit Date: 3/1/2022  9:47 PM  PCP: Vickie Dainelson MD    Subjective:     C/C:   Chief Complaint   Patient presents with    Fatigue       Interval History: Status: not changed. Patient seen and examined. No acute events overnight  Hgb stable  No overt GI bleeding  Daughter reports increased confusion this am    ROS:  Constitutional: negative for chills, fevers and sweats  Respiratory: negative for cough, dyspnea on exertion, hemoptysis, shortness of breath and wheezing  Cardiovascular: negative for chest pain, chest pressure/discomfort, dyspnea, lower extremity edema and palpitations  Gastrointestinal: negative for abdominal pain, constipation, diarrhea, nausea and vomiting  Neurological: negative for dizziness and headaches    Medications: Allergies:    Allergies   Allergen Reactions    Latex Rash    Aleve [Naproxen Sodium]      Chronic kidney disease stage III, CHF    Pioglitazone Other (See Comments)     Congestive heart failure    Claritin [Loratadine]     Keflex [Cephalexin]     Lisinopril      Needs clarification of contraindication       Current Meds: bumetanide (BUMEX) tablet 1 mg, Daily  ipratropium-albuterol (DUONEB) nebulizer solution 1 ampule, Q4H  sodium chloride flush 0.9 % injection 5-40 mL, 2 times per day  sodium chloride flush 0.9 % injection 10 mL, PRN  0.9 % sodium chloride infusion, PRN  ondansetron (ZOFRAN-ODT) disintegrating tablet 4 mg, Q8H PRN   Or  ondansetron (ZOFRAN) injection 4 mg, Q6H PRN  magnesium hydroxide (MILK OF MAGNESIA) 400 MG/5ML suspension 30 mL, Daily PRN  acetaminophen (TYLENOL) tablet 650 mg, Q6H PRN   Or  acetaminophen (TYLENOL) suppository 650 mg, Q6H PRN  albuterol (PROVENTIL) nebulizer solution 2.5 mg, Q6H PRN  allopurinol (ZYLOPRIM) tablet 300 mg, Daily  amLODIPine (NORVASC) tablet 10 mg, Daily  atorvastatin (LIPITOR) tablet 40 mg, Daily  chlorhexidine (HIBICLENS) 4 % liquid, Daily  [Held by provider] clopidogrel (PLAVIX) tablet 75 mg, Daily  magnesium oxide (MAG-OX) tablet 400 mg, BID  metoprolol tartrate (LOPRESSOR) tablet 50 mg, BID  miconazole (MICOTIN) 2 % powder, BID  therapeutic multivitamin-minerals 1 tablet, Daily  pantoprazole (PROTONIX) injection 40 mg, Daily   And  sodium chloride flush 0.9 % injection 10 mL, Daily  0.9 % sodium chloride infusion, PRN  cetirizine HCl (ZYRTEC) 5 MG/5ML solution 5 mg, Daily        Data:     Code Status:  Full Code    Family History   Problem Relation Age of Onset    Diabetes Mother     Heart Disease Mother     Heart Disease Father     Diabetes Sister     High Blood Pressure Sister     Diabetes Maternal Grandmother        Social History     Socioeconomic History    Marital status:      Spouse name: Not on file    Number of children: Not on file    Years of education: Not on file    Highest education level: Not on file   Occupational History    Not on file   Tobacco Use    Smoking status: Former Smoker     Packs/day: 0.25     Years: 10.00     Pack years: 2.50     Quit date: 2000     Years since quittin.1    Smokeless tobacco: Never Used   Vaping Use    Vaping Use: Never used   Substance and Sexual Activity    Alcohol use: Not Currently     Alcohol/week: 0.0 standard drinks    Drug use: No    Sexual activity: Not on file   Other Topics Concern    Not on file   Social History Narrative    Not on file     Social Determinants of Health     Financial Resource Strain: Low Risk     Difficulty of Paying Living Expenses: Not very hard   Food Insecurity: No Food Insecurity    Worried About Running Out of Food in the Last Year: Never true    Holden of Food in the Last Year: Never true   Transportation Needs:     Lack of Transportation (Medical): Not on file    Lack of Transportation (Non-Medical):  Not on file   Physical Activity:  Days of Exercise per Week: Not on file    Minutes of Exercise per Session: Not on file   Stress:     Feeling of Stress : Not on file   Social Connections:     Frequency of Communication with Friends and Family: Not on file    Frequency of Social Gatherings with Friends and Family: Not on file    Attends Che[employer] Services: Not on file    Active Member of [address] [place] [address] [employer] or Organizations: Not on file    Attends Club or Organization Meetings: Not on file    Marital Status: Not on file   Intimate Partner Violence:     Fear of Current or Ex-Partner: Not on file    Emotionally Abused: Not on file    Physically Abused: Not on file    Sexually Abused: Not on file   Housing Stability:     Unable to Pay for Housing in the Last Year: Not on file    Number of [place] in the Last Year: Not on file    Unstable Housing in the Last Year: Not on file       Vitals:  BP (!) 153/31   Pulse 90   Temp 98.1 °F (36.7 °C) (Temporal)   Resp 22   Ht 4' 11\" (1.499 m)   Wt 152 lb 8.9 oz (69.2 kg)   SpO2 94%   BMI 30.81 kg/m²   Temp (24hrs), Av °F (36.7 °C), Min:97.6 °F (36.4 °C), Max:98.7 °F (37.1 °C)    Recent Labs     22  1543 22  1950 22  0928 22  1137   POCGLU 100 107* 87 83       I/O (24Hr):     Intake/Output Summary (Last 24 hours) at 3/6/2022 1214  Last data filed at 3/6/2022 0600  Gross per 24 hour   Intake 445.84 ml   Output 1300 ml   Net -854.16 ml       Labs:      CBC:   Lab Results   Component Value Date    WBC 10.3 2022    RBC 2.67 2022    HGB 7.8 2022    HCT 24.4 2022    MCV 91.2 2022    MCH 29.3 2022    MCHC 32.1 2022    RDW 15.4 2022     2022    MPV 8.9 2022     CBC with Differential:    Lab Results   Component Value Date    WBC 10.3 2022    RBC 2.67 2022    HGB 7.8 2022    HCT 24.4 2022     2022    MCV 91.2 2022    MCH 29.3 2022    MCHC 32.1 2022    RDW 15.4 03/06/2022    NRBC 4 03/06/2022    LYMPHOPCT 2 03/06/2022    MONOPCT 9 03/06/2022    BASOPCT 0 03/06/2022    MONOSABS 0.93 03/06/2022    LYMPHSABS 0.21 03/06/2022    EOSABS 0.10 03/06/2022    BASOSABS 0.00 03/06/2022    DIFFTYPE NOT REPORTED 02/06/2022     Hemoglobin/Hematocrit:    Lab Results   Component Value Date    HGB 7.8 03/06/2022    HCT 24.4 03/06/2022     CMP:    Lab Results   Component Value Date     03/06/2022    K 4.3 03/06/2022     03/06/2022    CO2 21 03/06/2022    BUN 68 03/06/2022    CREATININE 1.69 03/06/2022    GFRAA 36 03/06/2022    LABGLOM 30 03/06/2022    GLUCOSE 142 03/06/2022    PROT 4.3 03/05/2022    LABALBU 2.3 03/05/2022    CALCIUM 8.0 03/06/2022    BILITOT 0.29 03/05/2022    ALKPHOS 184 03/05/2022    AST 40 03/05/2022    ALT 25 03/05/2022     BMP:    Lab Results   Component Value Date     03/06/2022    K 4.3 03/06/2022     03/06/2022    CO2 21 03/06/2022    BUN 68 03/06/2022    LABALBU 2.3 03/05/2022    CREATININE 1.69 03/06/2022    CALCIUM 8.0 03/06/2022    GFRAA 36 03/06/2022    LABGLOM 30 03/06/2022    GLUCOSE 142 03/06/2022     PT/INR:    Lab Results   Component Value Date    PROTIME 16.0 02/06/2022    INR 1.3 02/06/2022     PTT:    Lab Results   Component Value Date    APTT 34.9 02/06/2022   [APTT}    Physical Examination:        General appearance: alert, cooperative and no distress  Mental Status: oriented to person, place and time and normal affect  Abdomen: soft, nontender, nondistended, bowel sounds present all four quadrants, no masses, hepatomegaly or splenomegaly  Extremities: no edema, redness or tenderness in the calves  Skin: no gross lesions, rashes, or induration    Assessment:        Primary Problem  Acute GI bleeding     Active Hospital Problems    Diagnosis Date Noted    Symptomatic anemia [D64.9]     Family history of colon cancer [Z80.0]     Family history of pancreatic cancer [Z80.0]     Elevated LFTs [R79.89] 02/25/2021    Paroxysmal atrial fibrillation ([facility] [I48.0] 07/28/2020    Stage 3 chronic kidney disease ([facility] [N18.30]      Past Medical History:   Diagnosis Date    Acute CVA (cerebrovascular accident) ([facility] 7/25/2020    Altered mental status 5/26/2021    Arthritis     Brain mass     Cellulitis and abscess     Cellulitis and abscess of unspecified site     Cellulitis of both lower extremities 5/26/2021    Cellulitis of left lower extremity 7/26/2020    Cellulitis of lower extremity 10/4/2020    CHF (congestive heart failure) (HCC)     Chronic kidney disease, unspecified     Coronary atherosclerosis of native coronary artery     Essential hypertension 7/25/2020    Essential hypertension, malignant     Hallucinations 2/18/2021    Hard of hearing     Head contusion 9/9/2020    Hypokalemia 9/9/2020    Iron deficiency anemia, unspecified     Meningioma ([facility] 2/25/2021    Myocardial infarction ([facility]     Other and unspecified hyperlipidemia     Pure hypercholesterolemia     Toxic metabolic encephalopathy 3/59/7662    Type II or unspecified type diabetes mellitus without mention of complication, not stated as uncontrolled     Unspecified asthma(493.90)     Unspecified venous (peripheral) insufficiency     Unspecified vitamin D deficiency     UTI (urinary tract infection) 2/18/2021        Plan:        1. BRIGIDO  1. No overt bleeding  2. Hgb 7.8  3. Continue Venofer  4. Will plan GI workup once respiratory status improved, neurology/cardiology workup in process  5.  Continue PPI    Explained to the patient and d/W Nursing Staff  Will F/U with you  Please call or Page for any issues or change in status  Thanks    Electronically signed by MAL Lincoln NP on 3/6/2022 at 12:14 PM

## 2022-03-06 NOTE — FLOWSHEET NOTE
Patient was sleeping. Writer spoke to daughter Danielle Verma who said her mom didn't sleep much last night and wasn't feeling so great today. 03/06/22 1337   Encounter Summary   Services provided to: Patient and family together   Referral/Consult From: Last [facility]   (3-6-22)   Complexity of Encounter Moderate   Length of Encounter 15 minutes   Spiritual/Religion   Type Spiritual support   Assessment Calm; Approachable   Intervention Active listening;Explored feelings, thoughts, concerns;Sustaining presence/ Ministry of presence; Discussed illness/injury and it's impact   Outcome Expressed gratitude;Engaged in conversation;Expressed feelings/needs/concerns;Coping

## 2022-03-06 NOTE — PROGRESS NOTES
[facility]    Date:   3/6/2022  Patient name:  Martin Castelan  Date of admission:  3/1/2022  9:47 PM  MRN:   [id number]  YOB: 1950      Fatigue  Associated symptoms include fatigue. Overnight no fever chills loss of appetite positive for dyspnea on rest on nasal cannula  REVIEW OF SYSTEMS:    · Cardiovascular: Negative for lightheadedness/orthostatic symptoms ,chest pain, dyspnea on exertion, palpitations or loss of consciousness. · Respiratory: Positive for dyspnea on rest  · Gastrointestinal: Negative for nausea/vomiting, change in bowel habits, abdominal pain, dysphagia/appetite loss, hematemesis, blood in stools. OBJECTIVE:    BP (!) 125/34   Pulse 70   Temp 97.5 °F (36.4 °C) (Temporal)   Resp 18   Ht 4' 11\" (1.499 m)   Wt 152 lb 8.9 oz (69.2 kg)   SpO2 91%   BMI 30.81 kg/m²    On nasal cannula oxygen look dyspneic  · Lungs: Bibasal crackles  · Heart: regular rate and rhythm, S1, S2 normal, no murmur, click, rub or gallop  · Abdomen: soft, non-tender; bowel sounds normal; no masses,  no organomegaly  · Extremities: extremities normal, atraumatic, no cyanosis or edema  · Neurological:  Reflexes normal and symmetric. Sensation grossly normal  · Skin - no rash, no lump   · Eye- no icterus no redness  · GI-oral mucosa moist,  · Lymphatic system-no lymphadenopathy no splenomegaly  · Mouth- mucous membrane moist  · Head-normocephalic atraumatic  · Neck- supple no carotid bruit,Thyroid not palpable.       Past Medical History:   has a past medical history of Acute CVA (cerebrovascular accident) ([address] Altered mental status, Arthritis, Brain mass, Cellulitis and abscess, Cellulitis and abscess of unspecified site, Cellulitis of both lower extremities, Cellulitis of left lower extremity, Cellulitis of lower extremity, CHF (congestive heart failure) ([address] Chronic kidney disease, unspecified, Coronary atherosclerosis of native coronary artery, Essential hypertension, Essential hypertension, malignant, Hallucinations, Hard of hearing, Head contusion, Hypokalemia, Iron deficiency anemia, unspecified, Meningioma ([address] [address] Myocardial infarction ([address] Other and unspecified hyperlipidemia, Pure hypercholesterolemia, Toxic metabolic encephalopathy, Type II or unspecified type diabetes mellitus without mention of complication, not stated as uncontrolled, Unspecified asthma(493.90), Unspecified venous (peripheral) insufficiency, Unspecified vitamin D deficiency, and UTI (urinary tract infection). Past Surgical History:   has a past surgical history that includes Tonsillectomy and adenoidectomy and Coronary artery bypass graft. Home Medications:    Prior to Admission medications    Medication Sig Start Date End Date Taking?  Authorizing Provider   vitamin D (ERGOCALCIFEROL) 1.25 MG (19029 UT) CAPS capsule Take 1 capsule by mouth once a week  Patient taking differently: Take 50,000 Units by mouth once a week Sundays 3/1/22  Yes Johnson Thompson MD   albuterol sulfate HFA (PROAIR HFA) 108 (90 Base) MCG/ACT inhaler Inhale 2 puffs into the lungs every 6 hours as needed for Wheezing or Shortness of Breath (cough) 2/22/22  Yes Ramesh Sheehan MD   apixaban (ELIQUIS) 5 MG TABS tablet Take 1 tablet by mouth 2 times daily 2/15/22  Yes Johnson Thompson MD   allopurinol (ZYLOPRIM) 300 MG tablet Take 1 tablet by mouth daily Per rheumatologist 2/15/22  Yes Johnson Thompson MD   amLODIPine (NORVASC) 10 MG tablet Take 1 tablet by mouth daily FOR GOUT 2/15/22  Yes Johnson Thompson MD   atorvastatin (LIPITOR) 40 MG tablet Take 1 tablet by mouth once daily 2/15/22  Yes Johnson Thompson MD   bumetanide (BUMEX) 1 MG tablet Take 1 tablet by mouth 2 times daily 2/15/22  Yes Johnson Thompson MD   chlorhexidine (HIBICLENS) 4 % external liquid for decontamination AND [address] LEGS EVERY DAY 2/15/22  Yes Johnson Thompson MD clopidogrel (PLAVIX) 75 MG tablet Take 1 tablet by mouth daily 2/15/22  Yes Chucky Lebron MD   desloratadine (CLARINEX) 5 MG tablet Take 1 tablet by mouth once daily 2/15/22  Yes Chucky Lebron MD   magnesium oxide (MAG-OX) 400 (241.3 Mg) MG TABS tablet Take 1 tablet by mouth twice daily 2/15/22  Yes Chucky Lebron MD   metoprolol tartrate (LOPRESSOR) 50 MG tablet Take 1 tablet by mouth 2 times daily 2/15/22  Yes Chucky Lebron MD   miconazole (MICOTIN) 2 % powder Apply topically 2 times daily UNDER THE SKIN FOLDS LONG TERM 2/15/22  Yes Chucky Lebron MD   mupirocin (BACTROBAN) 2 % cream Apply 2 times dailY X 10 DAYS ON OPEN BLISTERS ON THE LEGS. 2/15/22 3/17/22 Yes Chucky Lebron MD   Multiple Vitamins-Minerals (THERAPEUTIC MULTIVITAMIN-MINERALS) tablet Take 1 tablet by mouth daily 2/15/22  Yes Chucky Lebron MD   colchicine (COLCRYS) 0.6 MG tablet Take 2 tablets now, then 1 tablet one hour later. Wait 12 hours then start 1 tablet bid for 5 days. Patient taking differently: Take 0.6 mg by mouth daily Take 2 tablets now, then 1 tablet one hour later. Wait 12 hours then start 1 tablet bid for 5 days. 2/15/22  Yes Chucky Lebron MD   Nebulizers (COMPRESSOR/NEBULIZER) MISC Nebulizer with compressor. Disposable Med Nebs 2 /month. Reusable Med Nebs 1 per 6 months. Aerosol Mask 1 per month. Replacement Filters 2 per month. All other related supplies as needed per month. Medications being used: Albuterol . 083 unit dose vial. Frequency: every 6 hrs x 4/day . Length of Need: 1 2/22/22   Zaira Dempsey MD   albuterol (PROVENTIL) (2.5 MG/3ML) 0.083% nebulizer solution Take 3 mLs by nebulization every 6 hours as needed for Wheezing or Shortness of Breath 2/22/22   Zaira Dempsey MD   Mis.  Devices (ADJUST FOLD CANE/YORK HANDLE) MISC Use daily for walking 2/22/22   Zaira Dempsey MD   Handicap Placard MISC by Does not apply route Expires on 12/30/25 2/22/22   MD Kurt Man Lancets MISC 1 each by Does not apply route daily Patient needs to contact office before any further refills will be approved 2/15/22   Angélica Hall MD   Bismuth Tribromoph-Petrolatum (XEROFORM PETROLATUM PATCH 2\"X2\") PADS external pads Apply 1 each topically daily 12/10/21   MAL Moreau - CNP   blood glucose test strips (FREESTYLE LITE) strip 1 each by In Vitro route daily As needed. 3/19/21   Angélica Hall MD   Blood Pressure KIT 1 kit by Does not apply route three times daily 12/19/19   Baron Olayinka MD   blood glucose monitor kit and supplies 1 kit by Other route three times daily Dispense Butterfly Elite CBG Device 8/20/19   Raina Gonzalez MD       Allergies:  Latex, Aleve [naproxen sodium], Pioglitazone, Claritin [loratadine], Keflex [cephalexin], and Lisinopril    Social History:   reports that she quit smoking about 22 years ago. She has a 2.50 pack-year smoking history. She has never used smokeless tobacco. She reports previous alcohol use. She reports that she does not use drugs. Family History: family history includes Diabetes in her maternal grandmother, mother, and sister; Heart Disease in her father and mother; High Blood Pressure in her sister. Laboratory Testing:  CBC:   Recent Labs     03/06/22 0421 03/06/22 0421 03/06/22  1346   WBC 10.3  --   --    HGB 7.8*   < > 7.4*     --   --     < > = values in this interval not displayed. BMP:    Recent Labs     03/04/22  1417 03/04/22  1417 03/05/22  0524 03/05/22  0524 03/06/22 0421      < > 142   < > 140   K 4.4   < > 4.1   < > 4.3      < > 111*   < > 106   CO2 20   < > 20   < > 21   BUN 71*   < > 71*   < > 68*   CREATININE 1.73*  --  1.73*  --  1.69*   GLUCOSE 93   < > 79   < > 142*    < > = values in this interval not displayed. S. Calcium:  Recent Labs     03/06/22 0421   CALCIUM 8.0*     S. Ionized Calcium:No results for input(s): IONCA in the last 72 hours.   S. Magnesium:  No results for input(s): MG in the last 72 hours. S. Phosphorus:No results for input(s): PHOS in the last 72 hours. S. Glucose:  Recent Labs     03/03/22  1950/22  0928 03/04/22  1137   POCGLU 107* 87 83     Glycosylated hemoglobin A1C: No results for input(s): LABA1C in the last 72 hours. INR: No results for input(s): INR in the last 72 hours. Hepatic functions:   Recent Labs     03/05/22  0524   ALKPHOS 184*   ALT 25   AST 40*   PROT 4.3*   BILITOT 0.29*   BILIDIR 0.13   LABALBU 2.3*     Pancreatic functions:  Recent Labs     03/04/22  1430   LACTA 0.7     S. Lactic Acid:   Recent Labs     03/04/22  1430   LACTA 0.7     Cardiac enzymes:No results for input(s): CKTOTAL, CKMB, CKMBINDEX, TROPONINI in the last 72 hours. BNP:No results for input(s): BNP in the last 72 hours. Lipid profile: No results for input(s): CHOL, TRIG, HDL, LDL, LDLCALC in the last 72 hours. Blood Gases: No results found for: PH, PCO2, PO2, HCO3, O2SAT  Thyroid functions:   Lab Results   Component Value Date    TSH 2.60 12/06/2021        Imaging/Diagonstics:  EKG: Normal sinus rhythm    CXR: No acute cardiopulmonary findings. ASSESSMENT:    Patient Active Problem List   Diagnosis    Vitamin D deficiency    Venous insufficiency of both lower extremities    Asthma    Type 2 diabetes mellitus with stage 3 chronic kidney disease, without long-term current use of insulin ([facility]    Coronary artery disease involving native coronary artery of native heart without angina pectoris    Iron deficiency anemia    Stage 3 chronic kidney disease ([facility]    Colonoscopy refused    Pneumococcal vaccination declined    Impaired hearing    Chronic diastolic CHF (congestive heart failure) ([facility]    COPD (chronic obstructive pulmonary disease) ([facility]    HTN.  Benign hypertension with CKD (chronic kidney disease) stage III (HCC)    Gout with tophi    Hyperlipidemia with target LDL less than 70    Dry skin dermatitis HANDS    Meningioma, cerebral (HCC)    Paroxysmal atrial fibrillation ([facility]    Influenza vaccination declined    Recurrent UTI    Elevated LFTs    Hypomagnesemia    Kidney stones    Diverticulosis    COVID-19 vaccination declined    Acute kidney injury ([facility]    Acute on chronic combined systolic (congestive) and diastolic (congestive) heart failure (HCC)    Chronic a-fib (HCC)    Chronic venous stasis dermatitis of both lower extremities    Symptomatic anemia    Family history of colon cancer    Family history of pancreatic cancer       PLAN:  65-year-old lady with history of atrial fibrillation history of coronary disease stent placed 20 years ago on Eliquis and Plavix 75 baseline hemoglobin was 10 2 weeks ago admitted with gradual increase of fatigue dyspnea exertion sleepy most part of the day no fever no vomiting blood no blood in the stool no dark stools no diarrhea hemoglobin ER was 4.4 suspected GI bleed Eliquis and Plavix held n.p.o. for now upper and lower endoscopies GI consulted  IV Venofer 200 mg daily  1 unit of PRBC given hemoglobin improved to 6 will need another unit of PRBC  Ckd,nephro consult,pt was due to see dr Jacqueline Mccarthy for ckd  March 3  dyspne on rest  On fluids  cxr   bnp  Hx of chf  On bowel prep for egd colon  Iv venofer  Hg stable at 7  Dc fluids  March 4  Case discussed with gi on floor  Procedure abandoned due to  Pt delirious  Did not sleep all night due to bowel prep  No focal deficit  Family in room  Ct head stat non contrast  cxr  Neuro consult  poc 252   with mild acidosis resp  Will consult plum  May need bipap  No sedatives or pain meds  Off fluids  Acute metabolic encephalopathy due to co2 narcosis  March 5  Patient seen in ICU  Nonverbal continue with the nasal cannula  Pulmonary wise stable plan for EGD Monday March 6  hallucination  Not sleeping  Anxious  Daughters in room  Will do seroquel 25 qhs  I doubt she sherly be ready for EGD    Jigna Collier MD, MD  [facility] [address], [address] [place] [address].    Phone [phone]   Fax: [phone]  Answering Service: [phone]

## 2022-03-07 ENCOUNTER — APPOINTMENT (OUTPATIENT)
Dept: GENERAL RADIOLOGY | Age: 72
DRG: 377 | End: 2022-03-07
Payer: OTHER GOVERNMENT

## 2022-03-07 ENCOUNTER — APPOINTMENT (OUTPATIENT)
Dept: CT IMAGING | Age: 72
DRG: 377 | End: 2022-03-07
Payer: OTHER GOVERNMENT

## 2022-03-07 LAB
ABO/RH: NORMAL
ALK PHOS BONE SPECIFIC: 128 U/L (ref 0–55)
ALK PHOS OTHER CALC: 0 U/L
ALK PHOSPHATASE: 200 U/L (ref 40–120)
ALKALINE PHOSPHATASE LIVER FRACTION: 72 U/L (ref 0–94)
ALLEN TEST: NORMAL
ALLEN TEST: NORMAL
ANION GAP SERPL CALCULATED.3IONS-SCNC: 11 MMOL/L (ref 9–17)
ANTIBODY SCREEN: NEGATIVE
ARM BAND NUMBER: NORMAL
BLD PROD TYP BPU: NORMAL
BLD PROD TYP BPU: NORMAL
BUN BLDV-MCNC: 72 MG/DL (ref 8–23)
CALCIUM SERPL-MCNC: 8.1 MG/DL (ref 8.6–10.4)
CARBOXYHEMOGLOBIN: 1.1 %
CARBOXYHEMOGLOBIN: 1.2 %
CHLORIDE BLD-SCNC: 104 MMOL/L (ref 98–107)
CO2: 22 MMOL/L (ref 20–31)
CREAT SERPL-MCNC: 1.79 MG/DL (ref 0.5–0.9)
CROSSMATCH RESULT: NORMAL
CROSSMATCH RESULT: NORMAL
DISPENSE STATUS BLOOD BANK: NORMAL
DISPENSE STATUS BLOOD BANK: NORMAL
EXPIRATION DATE: NORMAL
FIO2: 30
FIO2: 40
GFR AFRICAN AMERICAN: 34 ML/MIN
GFR NON-AFRICAN AMERICAN: 28 ML/MIN
GFR SERPL CREATININE-BSD FRML MDRD: ABNORMAL ML/MIN/{1.73_M2}
GLUCOSE BLD-MCNC: 117 MG/DL (ref 65–105)
GLUCOSE BLD-MCNC: 126 MG/DL (ref 70–99)
GLUCOSE BLD-MCNC: 91 MG/DL (ref 65–105)
HCO3 ARTERIAL: 24.4 MMOL/L
HCO3 ARTERIAL: 25.1 MMOL/L
HCT VFR BLD CALC: 23.9 % (ref 36–46)
HCT VFR BLD CALC: 24.3 % (ref 36–46)
HEMOGLOBIN: 7.7 G/DL (ref 12–16)
HEMOGLOBIN: 7.8 G/DL (ref 12–16)
MCH RBC QN AUTO: 30.1 PG (ref 26–34)
MCHC RBC AUTO-ENTMCNC: 31.7 G/DL (ref 31–37)
MCV RBC AUTO: 95 FL (ref 80–100)
METHEMOGLOBIN: 0.3 %
METHEMOGLOBIN: 0.4 %
MODE: NORMAL
MODE: NORMAL
NEGATIVE BASE EXCESS, ART: 0.1 MMOL/L (ref 0–2)
NEGATIVE BASE EXCESS, ART: 1.1 MMOL/L (ref 0–2)
O2 DEVICE/FLOW/%: NORMAL
O2 DEVICE/FLOW/%: NORMAL
O2 SAT, ARTERIAL: 95 %
O2 SAT, ARTERIAL: 95.3 %
PATIENT TEMP: 37
PATIENT TEMP: 37
PCO2 ARTERIAL: 42.5 MMHG
PCO2 ARTERIAL: 43.4 MMHG
PDW BLD-RTO: 16 % (ref 11.5–14.9)
PEEP/CPAP: 7
PH ARTERIAL: 7.36
PH ARTERIAL: 7.38
PLATELET # BLD: 235 K/UL (ref 150–450)
PMV BLD AUTO: 8.7 FL (ref 6–12)
PO2 ARTERIAL: 75.9 MMHG
PO2 ARTERIAL: 77 MMHG
POTASSIUM SERPL-SCNC: 4.5 MMOL/L (ref 3.7–5.3)
PROCALCITONIN: 0.26 NG/ML
PT. POSITION: NORMAL
PT. POSITION: NORMAL
RBC # BLD: 2.56 M/UL (ref 4–5.2)
RESPIRATORY RATE: 20
RESPIRATORY RATE: 22
SAMPLE SITE: NORMAL
SAMPLE SITE: NORMAL
SET RATE: 20
SODIUM BLD-SCNC: 137 MMOL/L (ref 135–144)
TEXT FOR RESPIRATORY: NORMAL
TEXT FOR RESPIRATORY: NORMAL
TOTAL RATE: 20
TRANSFUSION STATUS: NORMAL
TRANSFUSION STATUS: NORMAL
UNIT DIVISION: 0
UNIT DIVISION: 0
UNIT NUMBER: NORMAL
UNIT NUMBER: NORMAL
VT: 500
WBC # BLD: 6.9 K/UL (ref 3.5–11)

## 2022-03-07 PROCEDURE — 0BH17EZ INSERTION OF ENDOTRACHEAL AIRWAY INTO TRACHEA, VIA NATURAL OR ARTIFICIAL OPENING: ICD-10-PCS | Performed by: INTERNAL MEDICINE

## 2022-03-07 PROCEDURE — 6370000000 HC RX 637 (ALT 250 FOR IP): Performed by: INTERNAL MEDICINE

## 2022-03-07 PROCEDURE — 82947 ASSAY GLUCOSE BLOOD QUANT: CPT

## 2022-03-07 PROCEDURE — 36415 COLL VENOUS BLD VENIPUNCTURE: CPT

## 2022-03-07 PROCEDURE — C9113 INJ PANTOPRAZOLE SODIUM, VIA: HCPCS | Performed by: INTERNAL MEDICINE

## 2022-03-07 PROCEDURE — 2700000000 HC OXYGEN THERAPY PER DAY

## 2022-03-07 PROCEDURE — 5A1945Z RESPIRATORY VENTILATION, 24-96 CONSECUTIVE HOURS: ICD-10-PCS | Performed by: INTERNAL MEDICINE

## 2022-03-07 PROCEDURE — 36600 WITHDRAWAL OF ARTERIAL BLOOD: CPT

## 2022-03-07 PROCEDURE — 85018 HEMOGLOBIN: CPT

## 2022-03-07 PROCEDURE — 85014 HEMATOCRIT: CPT

## 2022-03-07 PROCEDURE — 2000000000 HC ICU R&B

## 2022-03-07 PROCEDURE — 82805 BLOOD GASES W/O2 SATURATION: CPT

## 2022-03-07 PROCEDURE — 85027 COMPLETE CBC AUTOMATED: CPT

## 2022-03-07 PROCEDURE — 84145 PROCALCITONIN (PCT): CPT

## 2022-03-07 PROCEDURE — 2580000003 HC RX 258: Performed by: INTERNAL MEDICINE

## 2022-03-07 PROCEDURE — 2500000003 HC RX 250 WO HCPCS: Performed by: INTERNAL MEDICINE

## 2022-03-07 PROCEDURE — 87641 MR-STAPH DNA AMP PROBE: CPT

## 2022-03-07 PROCEDURE — 71045 X-RAY EXAM CHEST 1 VIEW: CPT

## 2022-03-07 PROCEDURE — APPSS30 APP SPLIT SHARED TIME 16-30 MINUTES: Performed by: NURSE PRACTITIONER

## 2022-03-07 PROCEDURE — 94660 CPAP INITIATION&MGMT: CPT

## 2022-03-07 PROCEDURE — 94640 AIRWAY INHALATION TREATMENT: CPT

## 2022-03-07 PROCEDURE — 99232 SBSQ HOSP IP/OBS MODERATE 35: CPT | Performed by: INTERNAL MEDICINE

## 2022-03-07 PROCEDURE — 70450 CT HEAD/BRAIN W/O DYE: CPT

## 2022-03-07 PROCEDURE — 94002 VENT MGMT INPAT INIT DAY: CPT

## 2022-03-07 PROCEDURE — 80048 BASIC METABOLIC PNL TOTAL CA: CPT

## 2022-03-07 PROCEDURE — 6360000002 HC RX W HCPCS: Performed by: INTERNAL MEDICINE

## 2022-03-07 PROCEDURE — 94761 N-INVAS EAR/PLS OXIMETRY MLT: CPT

## 2022-03-07 PROCEDURE — 99233 SBSQ HOSP IP/OBS HIGH 50: CPT | Performed by: INTERNAL MEDICINE

## 2022-03-07 PROCEDURE — 31500 INSERT EMERGENCY AIRWAY: CPT

## 2022-03-07 RX ORDER — PROPOFOL 10 MG/ML
10 INJECTION, EMULSION INTRAVENOUS CONTINUOUS
Status: DISCONTINUED | OUTPATIENT
Start: 2022-03-07 | End: 2022-03-09

## 2022-03-07 RX ORDER — FENTANYL CITRATE 50 UG/ML
INJECTION, SOLUTION INTRAMUSCULAR; INTRAVENOUS
Status: COMPLETED | OUTPATIENT
Start: 2022-03-07 | End: 2022-03-07

## 2022-03-07 RX ORDER — CHLORHEXIDINE GLUCONATE 0.12 MG/ML
15 RINSE ORAL 2 TIMES DAILY
Status: DISCONTINUED | OUTPATIENT
Start: 2022-03-07 | End: 2022-03-09

## 2022-03-07 RX ORDER — MINERAL OIL AND WHITE PETROLATUM 150; 830 MG/G; MG/G
OINTMENT OPHTHALMIC EVERY 4 HOURS
Status: DISCONTINUED | OUTPATIENT
Start: 2022-03-07 | End: 2022-03-09

## 2022-03-07 RX ORDER — FENTANYL CITRATE 50 UG/ML
50 INJECTION, SOLUTION INTRAMUSCULAR; INTRAVENOUS EVERY 30 MIN PRN
Status: DISCONTINUED | OUTPATIENT
Start: 2022-03-07 | End: 2022-03-09

## 2022-03-07 RX ORDER — BUMETANIDE 0.25 MG/ML
2 INJECTION, SOLUTION INTRAMUSCULAR; INTRAVENOUS 2 TIMES DAILY
Status: DISCONTINUED | OUTPATIENT
Start: 2022-03-07 | End: 2022-03-08

## 2022-03-07 RX ORDER — POLYVINYL ALCOHOL 14 MG/ML
1 SOLUTION/ DROPS OPHTHALMIC EVERY 4 HOURS
Status: DISCONTINUED | OUTPATIENT
Start: 2022-03-07 | End: 2022-03-09

## 2022-03-07 RX ORDER — BUMETANIDE 0.25 MG/ML
1 INJECTION, SOLUTION INTRAMUSCULAR; INTRAVENOUS DAILY
Status: DISCONTINUED | OUTPATIENT
Start: 2022-03-07 | End: 2022-03-07

## 2022-03-07 RX ORDER — ETOMIDATE 2 MG/ML
INJECTION INTRAVENOUS
Status: COMPLETED | OUTPATIENT
Start: 2022-03-07 | End: 2022-03-07

## 2022-03-07 RX ADMIN — CHLORHEXIDINE GLUCONATE 15 ML: 1.2 RINSE ORAL at 16:00

## 2022-03-07 RX ADMIN — BUMETANIDE 1 MG: 0.25 INJECTION INTRAMUSCULAR; INTRAVENOUS at 11:58

## 2022-03-07 RX ADMIN — MAGNESIUM OXIDE TAB 400 MG (241.3 MG ELEMENTAL MG) 400 MG: 400 (241.3 MG) TAB at 20:46

## 2022-03-07 RX ADMIN — IPRATROPIUM BROMIDE AND ALBUTEROL SULFATE 1 AMPULE: 2.5; .5 SOLUTION RESPIRATORY (INHALATION) at 11:50

## 2022-03-07 RX ADMIN — IPRATROPIUM BROMIDE AND ALBUTEROL SULFATE 1 AMPULE: 2.5; .5 SOLUTION RESPIRATORY (INHALATION) at 14:50

## 2022-03-07 RX ADMIN — IPRATROPIUM BROMIDE AND ALBUTEROL SULFATE 1 AMPULE: 2.5; .5 SOLUTION RESPIRATORY (INHALATION) at 07:03

## 2022-03-07 RX ADMIN — ETOMIDATE INJECTION 40 MG: 2 SOLUTION INTRAVENOUS at 12:46

## 2022-03-07 RX ADMIN — MEROPENEM 2000 MG: 1 INJECTION, POWDER, FOR SOLUTION INTRAVENOUS at 15:23

## 2022-03-07 RX ADMIN — PROPOFOL 10 MCG/KG/MIN: 10 INJECTION, EMULSION INTRAVENOUS at 13:13

## 2022-03-07 RX ADMIN — FENTANYL CITRATE 50 MCG: 50 INJECTION, SOLUTION INTRAMUSCULAR; INTRAVENOUS at 12:47

## 2022-03-07 RX ADMIN — ANTI-FUNGAL POWDER MICONAZOLE NITRATE TALC FREE: 1.42 POWDER TOPICAL at 09:19

## 2022-03-07 RX ADMIN — IPRATROPIUM BROMIDE AND ALBUTEROL SULFATE 1 AMPULE: 2.5; .5 SOLUTION RESPIRATORY (INHALATION) at 00:28

## 2022-03-07 RX ADMIN — IPRATROPIUM BROMIDE AND ALBUTEROL SULFATE 1 AMPULE: 2.5; .5 SOLUTION RESPIRATORY (INHALATION) at 23:46

## 2022-03-07 RX ADMIN — SODIUM CHLORIDE, PRESERVATIVE FREE 10 ML: 5 INJECTION INTRAVENOUS at 09:25

## 2022-03-07 RX ADMIN — ANTI-FUNGAL POWDER MICONAZOLE NITRATE TALC FREE: 1.42 POWDER TOPICAL at 20:46

## 2022-03-07 RX ADMIN — BUMETANIDE 2 MG: 0.25 INJECTION INTRAMUSCULAR; INTRAVENOUS at 20:46

## 2022-03-07 RX ADMIN — MINERAL OIL, PETROLATUM: 425; 568 OINTMENT OPHTHALMIC at 22:00

## 2022-03-07 RX ADMIN — SODIUM CHLORIDE, PRESERVATIVE FREE 10 ML: 5 INJECTION INTRAVENOUS at 20:45

## 2022-03-07 RX ADMIN — POLYVINYL ALCOHOL 1 DROP: 14 SOLUTION/ DROPS OPHTHALMIC at 22:00

## 2022-03-07 RX ADMIN — SODIUM CHLORIDE, PRESERVATIVE FREE 10 ML: 5 INJECTION INTRAVENOUS at 11:58

## 2022-03-07 RX ADMIN — CHLORHEXIDINE GLUCONATE 15 ML: 1.2 RINSE ORAL at 20:46

## 2022-03-07 RX ADMIN — MEROPENEM 1000 MG: 1 INJECTION, POWDER, FOR SOLUTION INTRAVENOUS at 21:27

## 2022-03-07 RX ADMIN — IPRATROPIUM BROMIDE AND ALBUTEROL SULFATE 1 AMPULE: 2.5; .5 SOLUTION RESPIRATORY (INHALATION) at 20:04

## 2022-03-07 RX ADMIN — VANCOMYCIN HYDROCHLORIDE 1750 MG: 500 INJECTION, POWDER, LYOPHILIZED, FOR SOLUTION INTRAVENOUS at 16:08

## 2022-03-07 RX ADMIN — PROPOFOL 20 MCG/KG/MIN: 10 INJECTION, EMULSION INTRAVENOUS at 21:26

## 2022-03-07 RX ADMIN — PANTOPRAZOLE SODIUM 40 MG: 40 INJECTION, POWDER, FOR SOLUTION INTRAVENOUS at 11:58

## 2022-03-07 ASSESSMENT — PULMONARY FUNCTION TESTS
PIF_VALUE: 26
PIF_VALUE: 24
PIF_VALUE: 24
PIF_VALUE: 26
PIF_VALUE: 26
PIF_VALUE: 27
PIF_VALUE: 28
PIF_VALUE: 26
PIF_VALUE: 31
PIF_VALUE: 28
PIF_VALUE: 26
PIF_VALUE: 26
PIF_VALUE: 29
PIF_VALUE: 26
PIF_VALUE: 25
PIF_VALUE: 24
PIF_VALUE: 28
PIF_VALUE: 27
PIF_VALUE: 25
PIF_VALUE: 26
PIF_VALUE: 24
PIF_VALUE: 26
PIF_VALUE: 28
PIF_VALUE: 25
PIF_VALUE: 26
PIF_VALUE: 39
PIF_VALUE: 24
PIF_VALUE: 26
PIF_VALUE: 28
PIF_VALUE: 25
PIF_VALUE: 26
PIF_VALUE: 25
PIF_VALUE: 32
PIF_VALUE: 28

## 2022-03-07 ASSESSMENT — PAIN SCALES - GENERAL: PAINLEVEL_OUTOF10: 0

## 2022-03-07 NOTE — PROGRESS NOTES
Pt transferred from ICU to room #2090. Pt oriented to call light system and bed controls. No acute distress noted. Assessment complete.

## 2022-03-07 NOTE — PROCEDURES
Endotracheal Intubation Procedure Note    Indication for endotracheal intubation: respiratory failure  Sedation: fentanyl and etomidate  Paralytic: None  Equipment: 7.5mm cuffed endotracheal tube. and Glidescope 4  Cricoid Pressure: no  Number of attempts: 1  ETT location confirmed by by auscultation, by CXR and ETCO2 monitor    Patient had anterior vocal cords and grayish secretions in the upper airway.     Electronically signed by Jose De Jesus Flores MD on 3/7/2022 at 1:17 PM

## 2022-03-07 NOTE — PROGRESS NOTES
[facility] Internal Medicine    Progress Note    3/7/2022    11:19 AM    Name:   Chris Cuevas  MRN:     [id number]     Acct:      [de-identified]   Room:   2090/2090-01  IP Day:  5  Admit Date:  3/1/2022  9:47 PM    PCP:   Higinio Edward MD  Code Status:  Full Code    Subjective:     C/C:   Chief Complaint   Patient presents with    Fatigue         Interval History Status: Worsening, more delirious worsening respiratory status did not wear BiPAP overnight,  HPI:     See HPI    Review of Systems:   Positive for shortness of breath no new cough  Denies chest pain or palpitations  Denies abdominal pain, diarrhea vomiting  Denies any new numbness tremors or weakness. Medications: Allergies:     Allergies   Allergen Reactions    Latex Rash    Aleve [Naproxen Sodium]      Chronic kidney disease stage III, CHF    Pioglitazone Other (See Comments)     Congestive heart failure    Claritin [Loratadine]     Keflex [Cephalexin]     Lisinopril      Needs clarification of contraindication       Current Meds:   Scheduled Meds:    metoprolol tartrate  25 mg Oral BID    QUEtiapine  25 mg Oral Nightly    bumetanide  1 mg Oral Daily    ipratropium-albuterol  1 ampule Inhalation Q4H    sodium chloride flush  5-40 mL IntraVENous 2 times per day    allopurinol  300 mg Oral Daily    atorvastatin  40 mg Oral Daily    chlorhexidine   Topical Daily    [Held by provider] clopidogrel  75 mg Oral Daily    magnesium oxide  400 mg Oral BID    miconazole   Topical BID    therapeutic multivitamin-minerals  1 tablet Oral Daily    pantoprazole  40 mg IntraVENous Daily    And    sodium chloride flush  10 mL IntraVENous Daily    cetirizine HCl  5 mg Oral Daily     Continuous Infusions:    sodium chloride      sodium chloride       PRN Meds: sodium chloride flush, sodium chloride, ondansetron **OR** ondansetron, magnesium hydroxide, acetaminophen **OR** acetaminophen, albuterol, sodium chloride    Data:     Past Medical History:   has a past medical history of Acute CVA (cerebrovascular accident) ([facility] Altered mental status, Arthritis, Brain mass, Cellulitis and abscess, Cellulitis and abscess of unspecified site, Cellulitis of both lower extremities, Cellulitis of left lower extremity, Cellulitis of lower extremity, CHF (congestive heart failure) ([address] Chronic kidney disease, unspecified, Coronary atherosclerosis of native coronary artery, Essential hypertension, Essential hypertension, malignant, Hallucinations, Hard of hearing, Head contusion, Hypokalemia, Iron deficiency anemia, unspecified, Meningioma ([facility] Myocardial infarction ([facility] Other and unspecified hyperlipidemia, Pure hypercholesterolemia, Toxic metabolic encephalopathy, Type II or unspecified type diabetes mellitus without mention of complication, not stated as uncontrolled, Unspecified asthma(493.90), Unspecified venous (peripheral) insufficiency, Unspecified vitamin D deficiency, and UTI (urinary tract infection). Social History:   reports that she quit smoking about 22 years ago. She has a 2.50 pack-year smoking history. She has never used smokeless tobacco. She reports previous alcohol use. She reports that she does not use drugs. Family History:   Family History   Problem Relation Age of Onset    Diabetes Mother     Heart Disease Mother     Heart Disease Father     Diabetes Sister     High Blood Pressure Sister     Diabetes Maternal Grandmother        Vitals:  BP (!) 109/47   Pulse 64   Temp 97.6 °F (36.4 °C) (Oral)   Resp 20   Ht 4' 11\" (1.499 m)   Wt 152 lb 8.9 oz (69.2 kg)   SpO2 90%   BMI 30.81 kg/m²   Temp (24hrs), Av.7 °F (36.5 °C), Min:97.4 °F (36.3 °C), Max:98.2 °F (36.8 °C)    Recent Labs     22  1137   POCGLU 83       I/O (24Hr):   No intake or output data in the 24 hours ending 22 1119    Labs:    Lab Results   Component Value Date    WBC 6.9 2022    HGB 7.7 (L) 03/07/2022    HCT 24.3 (L) 03/07/2022    MCV 95.0 03/07/2022     03/07/2022     Lab Results   Component Value Date     03/07/2022    K 4.5 03/07/2022     03/07/2022    CO2 22 03/07/2022    BUN 72 03/07/2022    CREATININE 1.79 03/07/2022    GLUCOSE 126 03/07/2022    CALCIUM 8.1 03/07/2022          Lab Results   Component Value Date/Time    SPECIAL NOT REPORTED 06/11/2021 04:31 PM     Lab Results   Component Value Date/Time    CULTURE NO SIGNIFICANT GROWTH 02/22/2022 12:31 PM         Radiology:    Recent data reviewed    Physical Examination:        General appearance:  alert, cooperative and no distress  Eyes: Anicteric sclera. Pupils are equally round and reactive to light. Extraocular movements are intact.   Lungs:  B/l reduced air entry,   Heart:  regular rate and rhythm, no murmur  Abdomen:  soft, nontender, nondistended, normal bowel sounds, no masses, hepatomegaly, splenomegaly  Extremities:  no edema, redness, tenderness in the calves  Skin:  no gross lesions, rashes, induration  Neuro:  Alert, oriented X 3, no new focal weakness  Assessment:        Primary Problem  Acute GI bleeding    Active Hospital Problems    Diagnosis Date Noted    Symptomatic anemia [D64.9]     Family history of colon cancer [Z80.0]     Family history of pancreatic cancer [Z80.0]     Elevated LFTs [R79.89] 02/25/2021    Paroxysmal atrial fibrillation ([facility] [I48.0] 07/28/2020    Stage 3 chronic kidney disease ([facility] [N18.30]            DVT prophylaxis: Mechanical  Antibiotics: None    Plan:        78-year-old lady with history of atrial fibrillation history of coronary disease stent placed 20 years ago on Eliquis and Plavix 75 baseline hemoglobin was 10 2 weeks ago admitted with gradual increase of fatigue dyspnea exertion sleepy most part of the day no fever no vomiting blood no blood in the stool no dark stools no diarrhea hemoglobin ER was 4.4 suspected GI bleed Eliquis and Plavix held n.p.o. for now upper and lower endoscopies GI consulted  IV Venofer 200 mg daily  1 unit of PRBC given hemoglobin improved to 6 will need another unit of PRBC  Ckd,nephro consult,pt was due to see dr Dagoberto Rolon for ckd    Needs EGD but has been difficult to obtain due to delirium, CO2 narcosis, respiratory acidosis, CHF exacerbation  Cardiology nephrology pulmonology following    3/7  Was moved out from ICU last night  Did not wear BiPAP overnight, chest x-ray worsening with bilateral effusions-we will await blood recommendation regarding thoracentesis  ABG did not show hypoxia hypercapnia  Was also started on Seroquel yesterday for hallucination anxiety and insomnia-suspect residual sedation from this; will reevaluate later today  EGD canceled for today        Baron Olayinka MD  3/7/2022  11:19 AM

## 2022-03-07 NOTE — PROGRESS NOTES
Department of Internal Medicine  Nephrology [facility], MD   progress note    Reason for consultation: Management of acute kidney injury superimposed on chronic kidney disease stage III. Subjective/interval history:    Patient seen and examined   Patient is sleeping and on BiPAP  ABG pH 7.35 PCO2 43.4 PO2 75.9 bicarb 24.4  Serum creatinine stable at 1.7 mg/dL  Urine output noted with external catheter 650 mL urine output  Hemoglobin improved    History of present illness: This is a 66 y.o. female with a significant past medical history of Heart Failure with reduced ejection fraction [HFrEF with LVEF 45 to 50% in July 2020 secondary to ischemic cardiomyopathy], Coronary artery disease, Meningioma, type 2 diabetes mellitus [with nonproliferative diabetic retinopathy], essential hypertension, bilateral deafness and chronic kidney disease stage III [baseline serum creatinine 1.6 mg/dL and follows up with Dr. Jaylyn Guzman of renal services of [facility] whom she saw 1 week ago and was switched from Lasix to Bumex], presented with complaints of fatigue and dyspnea with exertion progressively worsened over 2 weeks. She had been recently admitted and seen by us at [facility] on 2/11/2022. Laboratory studies at presentation however were remarkable for hemoglobin 7.4 g/dL and BUN/creatinine 110/2.11 mg/dL. She denies hemoptysis or melena stools. However, hemoglobin dropped overnight to 4.4 g/dL and she is scheduled for endoscopy tomorrow.     Latex, Aleve [naproxen sodium], Pioglitazone, Claritin [loratadine], Keflex [cephalexin], and Lisinopril    Past Medical History:   Diagnosis Date    Acute CVA (cerebrovascular accident) ([facility] [address] 7/25/2020    Altered mental status 5/26/2021    Arthritis     Brain mass     Cellulitis and abscess     Cellulitis and abscess of unspecified site     Cellulitis of both lower extremities 5/26/2021    Cellulitis of left lower extremity 7/26/2020    Cellulitis of lower extremity 10/4/2020    CHF (congestive heart failure) (HCC)     Chronic kidney disease, unspecified     Coronary atherosclerosis of native coronary artery     Essential hypertension 7/25/2020    Essential hypertension, malignant     Hallucinations 2/18/2021    Hard of hearing     Head contusion 9/9/2020    Hypokalemia 9/9/2020    Iron deficiency anemia, unspecified     Meningioma ([facility] [address] 2/25/2021    Myocardial infarction (HCC)     Other and unspecified hyperlipidemia     Pure hypercholesterolemia     Toxic metabolic encephalopathy 5/73/4924    Type II or unspecified type diabetes mellitus without mention of complication, not stated as uncontrolled     Unspecified asthma(493.90)     Unspecified venous (peripheral) insufficiency     Unspecified vitamin D deficiency     UTI (urinary tract infection) 2/18/2021       Scheduled Meds:   metoprolol tartrate  25 mg Oral BID    bumetanide  1 mg IntraVENous Daily    QUEtiapine  25 mg Oral Nightly    ipratropium-albuterol  1 ampule Inhalation Q4H    sodium chloride flush  5-40 mL IntraVENous 2 times per day    allopurinol  300 mg Oral Daily    atorvastatin  40 mg Oral Daily    chlorhexidine   Topical Daily    [Held by provider] clopidogrel  75 mg Oral Daily    magnesium oxide  400 mg Oral BID    miconazole   Topical BID    therapeutic multivitamin-minerals  1 tablet Oral Daily    pantoprazole  40 mg IntraVENous Daily    And    sodium chloride flush  10 mL IntraVENous Daily    cetirizine HCl  5 mg Oral Daily     Continuous Infusions:   sodium chloride      sodium chloride       PRN Meds:.sodium chloride flush, sodium chloride, ondansetron **OR** ondansetron, magnesium hydroxide, acetaminophen **OR** acetaminophen, albuterol, sodium chloride        Physical Exam:    VITALS:  BP (!) 124/56   Pulse 69   Temp 97.6 °F (36.4 °C) (Oral)   Resp 22   Ht 4' 11\" (1.499 m)   Wt 152 lb 8.9 oz (69.2 kg)   SpO2 98%   BMI 30.81 kg/m² Constitutional: Patient is lethargic, on BiPAP    Skin: Skin color, texture, turgor normal. No rashes or lesions    Head: Normocephalic, without obvious abnormality, atraumatic     Cardiovascular/Edema :regular rate and rhythm    Respiratory: Lungs: Diminished breath sounds posteriorly, on BiPAP    Abdomen: Soft nontender    Back: symmetric, no curvature. ROM normal. No CVA tenderness. Extremities: 1-2+ edema bilaterally    Neuro:  Grossly normal      CBC:   Recent Labs     03/05/22  0524 03/05/22  1410 03/06/22  0421 03/06/22  0421 03/06/22  1346 03/06/22  2149 03/07/22  0553   WBC 5.9  --  10.3  --   --   --  6.9   HGB 6.9*   < > 7.8*   < > 7.4* 8.2* 7.7*     --  254  --   --   --  235    < > = values in this interval not displayed. BMP:    Recent Labs     03/05/22  0524 03/06/22  0421 03/07/22  0553    140 137   K 4.1 4.3 4.5   * 106 104   CO2 20 21 22   BUN 71* 68* 72*   CREATININE 1.73* 1.69* 1.79*   GLUCOSE 79 142* 126*       Lab Results   Component Value Date    NITRU NEGATIVE 02/22/2022    COLORU Yellow 02/22/2022    PHUR 5.0 02/22/2022    WBCUA 10 TO 20 02/22/2022    RBCUA TOO NUMEROUS TO COUNT 02/22/2022    MUCUS NOT REPORTED 02/07/2022    TRICHOMONAS NOT REPORTED 02/07/2022    YEAST NOT REPORTED 02/07/2022    BACTERIA None 02/22/2022    SPECGRAV 1.016 02/22/2022    LEUKOCYTESUR NEGATIVE 02/22/2022    UROBILINOGEN Normal 02/22/2022    BILIRUBINUR NEGATIVE 02/22/2022    GLUCOSEU TRACE 02/22/2022    KETUA NEGATIVE 02/22/2022    AMORPHOUS NOT REPORTED 02/07/2022     Urine Protein:     Lab Results   Component Value Date     10/27/2020     Urine Creatinine:     Lab Results   Component Value Date    LABCREA 26.2 05/26/2021     Imaging studies. Chest x-ray increasing congestive changes small-to-moderate bilateral pleural effusion      IMPRESSION/RECOMMENDATIONS:      1.   Acute kidney injury superimposed on chronic kidney disease stage III most consistent with prerenal azotemia led to dehydration and anemia. Serum creatinine stable, serum creatinine 1.7 mg/dL      2. Systemic hypertension controlled. 3.  Acute on chronic anemia - rule out GI bleed. Plavix is on hold. 4.  CHF with preserved EF moderate left ventricular hypertrophy diastolic dysfunction, mild to moderate aortic regurgitation, proBNP increased from 5,281--11,561    Plan:    Increase Bumex IV 2 mg Bumex twice daily  Prognosis is guarded. Thank you very much for the courtesy and confidence of this consultation.     Sharyle Sheen, MD   Attending Nephrologist  3/7/2022 12:45 PM

## 2022-03-07 NOTE — PROGRESS NOTES
Dr. Sanjuana Gibbs MD / Dr. Jaron Aguilar MD / Dr. Alem Kilpatrick [facility]                  Pulmonary Consult Note   [address] [facility]      Patient - Eris Rey,  Age - 66 y.o.    - 1950      Room Number - -01   MRN -  [id number]   Acct # - [de-identified]  Date of Admission -  3/1/2022  9:47 PM        Freddy Liu MD  Primary Care Physician - Mark Delgado MD     HPI   Patient's vital signs are stable. She is on 3 L nasal cannula. Plans for EGD today however due to condition will need to be held. On my evaluation pt is hardly responsive attempts to open eyes t command but otherwise not following any other commands. She did not wear BiPAP for most of the night last night. I spoke with RN at the bedside. She has been back on BIPAP since 7am. Her chest xray is worse with bilateral effusions. Pt pulse ox is 98%. Noted pt was also started on Seroquel last night. OBJECTIVE   VITALS    height is 4' 11\" (1.499 m) and weight is 152 lb 8.9 oz (69.2 kg). Her oral temperature is 97.6 °F (36.4 °C). Her blood pressure is 109/47 (abnormal) and her pulse is 64. Her respiration is 20 and oxygen saturation is 90%. Body mass index is 30.81 kg/m².   Temperature Range: Temp: 97.6 °F (36.4 °C) Temp  Av.7 °F (36.5 °C)  Min: 97.4 °F (36.3 °C)  Max: 98.2 °F (36.8 °C)  BP Range:  Systolic (97TXY), UJI:737 , Min:106 , GEX:111     Diastolic (20QYY), IYJ:64, Min:31, Max:110    Pulse Range: Pulse  Av  Min: 64  Max: 92  Respiration Range: Resp  Av.6  Min: 17  Max: 26  Current Pulse Ox[de-identified]  SpO2: 90 %  24HR Pulse Ox Range:  SpO2  Av.8 %  Min: 90 %  Max: 100 %  Oxygen Amount and Delivery: O2 Flow Rate (L/min): 3 L/min    Wt Readings from Last 3 Encounters:   22 152 lb 8.9 oz (69.2 kg)   22 156 lb 1.4 oz (70.8 kg)   01/25/22 143 lb 6.4 oz (65 kg)       I/O (24 Hours)    Intake/Output Summary (Last 24 hours) at 3/7/2022 8956  Last data filed at 3/6/2022 0830  Gross per 24 hour   Intake 300 ml   Output 650 ml   Net -350 ml       EXAM     General Appearance: pt is minimally responsive, opens eyes but not following commands  HEENT - normocephalic, atraumatic   Neck - Supple,  trachea midline   Lungs - clear anterior.  Unable to auscultate posterior lung fields due to condition   Cardiovascular - Heart sounds are normal.  Regular rate and rhythm   Abdomen - Soft, nontender, nondistended, no masses or organomegaly  Skin - No bruising or bleeding, pale  Extremities - No  cyanosis, edema    MEDS      QUEtiapine  25 mg Oral Nightly    bumetanide  1 mg Oral Daily    ipratropium-albuterol  1 ampule Inhalation Q4H    sodium chloride flush  5-40 mL IntraVENous 2 times per day    allopurinol  300 mg Oral Daily    amLODIPine  10 mg Oral Daily    atorvastatin  40 mg Oral Daily    chlorhexidine   Topical Daily    [Held by provider] clopidogrel  75 mg Oral Daily    magnesium oxide  400 mg Oral BID    metoprolol tartrate  50 mg Oral BID    miconazole   Topical BID    therapeutic multivitamin-minerals  1 tablet Oral Daily    pantoprazole  40 mg IntraVENous Daily    And    sodium chloride flush  10 mL IntraVENous Daily    cetirizine HCl  5 mg Oral Daily      sodium chloride      sodium chloride       sodium chloride flush, sodium chloride, ondansetron **OR** ondansetron, magnesium hydroxide, acetaminophen **OR** acetaminophen, albuterol, sodium chloride    LABS   CBC   Recent Labs     03/07/22  0553   WBC 6.9   HGB 7.7*   HCT 24.3*   MCV 95.0        BMP:   Lab Results   Component Value Date     03/07/2022    K 4.5 03/07/2022     03/07/2022    CO2 22 03/07/2022    BUN 72 03/07/2022    LABALBU 2.3 03/05/2022    CREATININE 1.79 03/07/2022    CALCIUM 8.1 03/07/2022    GFRAA 34 03/07/2022    LABGLOM 28 03/07/2022     ABGs:  Lab Results   Component Value Date    PHART 7.261 03/04/2022    PO2ART 58.0 03/04/2022    VDK1ZSC 51.8 03/04/2022    No results found for: IFIO2, MODE, SETTIDVOL, SETPEEP  Ionized Calcium:  No results found for: IONCA  Magnesium:    Lab Results   Component Value Date    MG 1.8 03/01/2022     Phosphorus:    Lab Results   Component Value Date    PHOS 4.5 01/21/2022        LIVER PROFILE   Recent Labs     03/05/22  0524   AST 40*   ALT 25   BILIDIR 0.13   BILITOT 0.29*   ALKPHOS 184*     INR No results for input(s): INR in the last 72 hours. PTT   Lab Results   Component Value Date    APTT 34.9 02/06/2022         RADIOLOGY     (See actual reports for details)    ASSESSMENT/PLAN       1. Acute hypercapnic respiratory failure-worsening  2. Hypoxia-not normally on home O2  3. Acute on chronic diastolic CHF-Dr Melton, worsening effusions bilateral  4. CAD-history of CABG [place]  5. Mild AS  6. Mild to mod AR  7. COPD? Uses inhaler, no pulmonologist, no PFT, smoked for 30+ years quitting in 2000  8. CKDz III  9. [place]  10. A fib-eliquis held for anemia  11. DM2  12. AZIZA? Never tested but does snore and nap  13. Anemia-GI source eval underway, Hgb [place] 4.4 s/p transfusion, now HgB 7.7, no active signs of bleeding now  14. Full code          Plan:    STAT ABG  Transfer to ICU, pt may require intubation if not improving  I spoke to RN at bedside  Pt may need thoracentesis, I reviewed chest xray  Not stable enough for EGD unless pt ends up being intubated.    I did discussed with Dr. Milan Cook who also came to bedside  I spoke to primary service    CC 30 min          Electronically signed by Wende Severin, APRN - CNP on 3/7/2022 at 8:23 AM

## 2022-03-07 NOTE — PROGRESS NOTES
ICU Progress Note (Vent)   [facility]    Patient - Derek Rosales,  Age - 66 y.o.    - 1950      Room Number -    MRN -  [id number]   [facility]  Date of Admission -  3/1/2022  9:47 PM    Events of Past 24 Hours   I was asked to see patient twice this morning for worsening respiratory status, patient now cannot protect airway completely unresponsive. Despite ABG being relatively normal, I feel she has worsened over the last hour or 2. Patient was made to transfer to the ICU and then subsequently we electively intubated her. Vitals    height is 4' 11\" (1.499 m) and weight is 152 lb 8.9 oz (69.2 kg). Her oral temperature is 97.6 °F (36.4 °C). Her blood pressure is 124/56 (abnormal) and her pulse is 72. Her respiration is 19 and oxygen saturation is 95%. Temperature Range: Temp: 97.6 °F (36.4 °C) Temp  Av.5 °F (36.4 °C)  Min: 97.4 °F (36.3 °C)  Max: 97.6 °F (36.4 °C)  BP Range:  Systolic (90OQH), ZOI:914 , Min:106 , IVN:494     Diastolic (00SAG), OMID:46, Min:34, Max:110    Pulse Range: Pulse  Av.1  Min: 64  Max: 82  Respiration Range: Resp  Av.1  Min: 18  Max: 26  Current Pulse Ox[de-identified]  SpO2: 95 %  24HR Pulse Ox Range:  SpO2  Av.3 %  Min: 90 %  Max: 99 %  Oxygen Amount and Delivery: O2 Flow Rate (L/min): 3 L/min      Wt Readings from Last 3 Encounters:   22 152 lb 8.9 oz (69.2 kg)   22 156 lb 1.4 oz (70.8 kg)   22 143 lb 6.4 oz (65 kg)     I/O   No intake or output data in the 24 hours ending 22 1308  I/O last 3 completed shifts: In: 445.8 [P.O.:300; I.V.:35.8;  IV Piggyback:110]  Out: 1350 [Urine:1350]     DRAIN/TUBE OUTPUT:     Invasive Lines   ETT Day -   1  Lines -  0    ICP PRESSURE RANGE:  No data recorded  CVP PRESSURE RANGE:  No data recorded  Mechanical Ventilation Data   SETTINGS (Comprehensive)  Vent Information  $Ventilation: $Initial Day  Skin Assessment: Clean, dry, & intact  Suction Catheter Diameter: 14  Vent Type: Servo i  Vent Mode: PRVC  Vt Ordered: 500 mL  Rate Set: 20 bmp  FiO2 : 40 %  SpO2: 95 %  SpO2/FiO2 ratio: 237.5  Sensitivity: 5  PEEP/CPAP: 7  I Time/ I Time %: 0.9 s  Mask Type: Total face  Mask Size: Large  Additional Respiratory  Assessments  Pulse: 72  Resp: 19  SpO2: 95 %  Oral Care: Mouth moisturizer       ABGs:   Lab Results   Component Value Date    PHART 7.359 03/07/2022    PO2ART 75.9 03/07/2022    BRY8TMW 43.4 03/07/2022       Lab Results   Component Value Date    MODE 18/6 03/07/2022         Medications   IV   propofol      sodium chloride      sodium chloride        metoprolol tartrate  25 mg Oral BID    bumetanide  2 mg IntraVENous BID    polyvinyl alcohol  1 drop Both Eyes Q4H    And    artificial tears   Both Eyes Q4H    chlorhexidine  15 mL Mouth/Throat BID    QUEtiapine  25 mg Oral Nightly    ipratropium-albuterol  1 ampule Inhalation Q4H    sodium chloride flush  5-40 mL IntraVENous 2 times per day    allopurinol  300 mg Oral Daily    atorvastatin  40 mg Oral Daily    chlorhexidine   Topical Daily    [Held by provider] clopidogrel  75 mg Oral Daily    magnesium oxide  400 mg Oral BID    miconazole   Topical BID    therapeutic multivitamin-minerals  1 tablet Oral Daily    pantoprazole  40 mg IntraVENous Daily    And    sodium chloride flush  10 mL IntraVENous Daily    cetirizine HCl  5 mg Oral Daily       Diet/Nutrition   ADULT ORAL NUTRITION SUPPLEMENT; Lunch; Standard High Calorie/High Protein Oral Supplement  ADULT DIET; Full Liquid; 1500 ml    Exam   VITALS    height is 4' 11\" (1.499 m) and weight is 152 lb 8.9 oz (69.2 kg). Her oral temperature is 97.6 °F (36.4 °C). Her blood pressure is 124/56 (abnormal) and her pulse is 72. Her respiration is 19 and oxygen saturation is 95%.    Ventilator Settings (Basic)  Vent Mode: PRVC Rate Set: 20 bmp/Vt Ordered: 500 mL/ /FiO2 : 40 %    Constitutional - Sedated (after intubation but ASSESSMENT    Acute respiratory failure, hypoxic and hypercapnic  Acute on chronic diastolic heart failure with worsening bilateral effusions  COPD, clinical diagnosis no PFTs, no pulmonology  History of tobacco use 30+ pack year  Nonmorbid obesity  AZIZA clinical diagnosis  Chronic kidney disease  Anemia  Full CODE STATUS      Full ventilator support with ventilator bundle  Check sputum culture  Check MRSA swab  Check procalcitonin level  Decrease frequency of blood draws, H&H has been stable  Broad-spectrum antibiotic-vancomycin and Merrem  Continue diuresis  Continue bronchodilator  Check stat CT of brain  Updated daughters in detail  Discussed with nursing and respiratory staff    Critical Care Time   35 min    Electronically signed by Alphonso Fisher MD on 3/7/2022 at 1:08 PM

## 2022-03-07 NOTE — CARE COORDINATION
ONGOING DISCHARGE PLAN:    Patient is on continuous bipap    Was moved from ICU yesterday to PCU and did not wear her Bipap at [facility],     EGD cancelled for today    Per Cardiology, patient may need a right thoracentesis. LSW was following for SNF, however was determined that patient has no SNF insurance. Discharge plan will be for family to take her home. Remains on IV Bumex    Will continue to follow for additional discharge needs.     Electronically signed by Mae Mares RN on 3/7/2022 at 12:02 PM

## 2022-03-07 NOTE — FLOWSHEET NOTE
Update from SHERITA Mayo. Daughter Svitlana at bedside; she's anxious and worried about her mom. Writer provided listening presence and prayer. 03/07/22 1713   Encounter Summary   Services provided to: Patient and family together   Referral/Consult From: Last [facility]   (3-7-22)   Complexity of Encounter Moderate   Length of Encounter 15 minutes   Spiritual/Shinto   Type Spiritual support   Assessment Approachable; Anxious   Intervention Active listening;Explored feelings, thoughts, concerns;Prayer;Sustaining presence/ Ministry of presence; Discussed illness/injury and it's impact   Outcome Expressed gratitude;Engaged in conversation;Expressed feelings/needs/concerns;Receptive

## 2022-03-07 NOTE — PROGRESS NOTES
Pt intubated successfully by Dr. Susi uMniz. Pt intubated using glidescope size 4 Blade ETT 7.5 located at 25cm @ lip.  Positive color change on colormetric as well as fogging of tube, awaiting CXR for placement

## 2022-03-07 NOTE — PROGRESS NOTES
Patient placed on bipap and immediately did not tolerate. Patient placed back on nasal cannula and tolerating well at this time. Will continue monitor.

## 2022-03-07 NOTE — PROGRESS NOTES
At bedside shift report RN noted that patient appeared lethargic, slow to open eyes and answer questions but could follow commands. Patient placed back on BiPap at this time. Will continue to monitor for Bipap compliance.

## 2022-03-07 NOTE — PROGRESS NOTES
probability that AUC is >400 (efficacy); Pconc: probability that Ctrough is above 20 ?g/mL (toxicity); Tox: Probability of nephrotoxicity, based on Natan et al. Clin Infect Dis 2009. Thank you for the consult. Pharmacy will continue to follow.     Rubi NoriegaD, BCPS  3/7/2022 1:38 PM

## 2022-03-07 NOTE — PROGRESS NOTES
GI Progress notes    3/7/2022   12:26 PM    Name:  Gayathri Rivas  MRN:    [id number]     Acct:     [de-identified]   Room:  2090/2090-01   Day: 5     Admit Date: 3/1/2022  9:47 PM  PCP: Yumiko Petty MD    Subjective:     C/C:   Chief Complaint   Patient presents with    Fatigue       Interval History: Status: worsened. Patient seen and examined. Patient non-responsive. She is currently on BiPAP, however refused to wear overnight. Discussed with admitting  Plans to transfer to ICU for closer monitoring  Her ABGs did not reveal any hypoxia, hypercapnia. Was started on Seroquel last night. No overt GI bleeding  Hgb stable. ROS:  Unable to assess:  Patient worsening respiratory status, non responsive    Medications: Allergies:    Allergies   Allergen Reactions    Latex Rash    Aleve [Naproxen Sodium]      Chronic kidney disease stage III, CHF    Pioglitazone Other (See Comments)     Congestive heart failure    Claritin [Loratadine]     Keflex [Cephalexin]     Lisinopril      Needs clarification of contraindication       Current Meds: metoprolol tartrate (LOPRESSOR) tablet 25 mg, BID  bumetanide (BUMEX) injection 1 mg, Daily  QUEtiapine (SEROQUEL) tablet 25 mg, Nightly  ipratropium-albuterol (DUONEB) nebulizer solution 1 ampule, Q4H  sodium chloride flush 0.9 % injection 5-40 mL, 2 times per day  sodium chloride flush 0.9 % injection 10 mL, PRN  0.9 % sodium chloride infusion, PRN  ondansetron (ZOFRAN-ODT) disintegrating tablet 4 mg, Q8H PRN   Or  ondansetron (ZOFRAN) injection 4 mg, Q6H PRN  magnesium hydroxide (MILK OF MAGNESIA) 400 MG/5ML suspension 30 mL, Daily PRN  acetaminophen (TYLENOL) tablet 650 mg, Q6H PRN   Or  acetaminophen (TYLENOL) suppository 650 mg, Q6H PRN  albuterol (PROVENTIL) nebulizer solution 2.5 mg, Q6H PRN  allopurinol (ZYLOPRIM) tablet 300 mg, Daily  atorvastatin (LIPITOR) tablet 40 mg, Daily  chlorhexidine (HIBICLENS) 4 % liquid, Daily  [Held by provider] clopidogrel (PLAVIX) tablet 75 mg, Daily  magnesium oxide (MAG-OX) tablet 400 mg, BID  miconazole (MICOTIN) 2 % powder, BID  therapeutic multivitamin-minerals 1 tablet, Daily  pantoprazole (PROTONIX) injection 40 mg, Daily   And  sodium chloride flush 0.9 % injection 10 mL, Daily  0.9 % sodium chloride infusion, PRN  cetirizine HCl (ZYRTEC) 5 MG/5ML solution 5 mg, Daily        Data:     Code Status:  Full Code    Family History   Problem Relation Age of Onset    Diabetes Mother     Heart Disease Mother     Heart Disease Father     Diabetes Sister     High Blood Pressure Sister     Diabetes Maternal Grandmother        Social History     Socioeconomic History    Marital status:      Spouse name: Not on file    Number of children: Not on file    Years of education: Not on file    Highest education level: Not on file   Occupational History    Not on file   Tobacco Use    Smoking status: Former Smoker     Packs/day: 0.25     Years: 10.00     Pack years: 2.50     Quit date: 2000     Years since quittin.1    Smokeless tobacco: Never Used   Vaping Use    Vaping Use: Never used   Substance and Sexual Activity    Alcohol use: Not Currently     Alcohol/week: 0.0 standard drinks    Drug use: No    Sexual activity: Not on file   Other Topics Concern    Not on file   Social History Narrative    Not on file     Social Determinants of Health     Financial Resource Strain: Low Risk     Difficulty of Paying Living Expenses: Not very hard   Food Insecurity: No Food Insecurity    Worried About Running Out of Food in the Last Year: Never true    Holden of Food in the Last Year: Never true   Transportation Needs:     Lack of Transportation (Medical): Not on file    Lack of Transportation (Non-Medical):  Not on file   Physical Activity:     Days of Exercise per Week: Not on file    Minutes of Exercise per Session: Not on file   Stress:     Feeling of Stress : Not on file   Social Connections:  Frequency of Communication with Friends and Family: Not on file    Frequency of Social Gatherings with Friends and Family: Not on file    Attends [employer] Services: Not on file    Active Member of Clubs or Organizations: Not on file    Attends Club or Organization Meetings: Not on file    Marital Status: Not on file   Intimate Partner Violence:     Fear of Current or Ex-Partner: Not on file    Emotionally Abused: Not on file    Physically Abused: Not on file    Sexually Abused: Not on file   Housing Stability:     Unable to Pay for Housing in the Last Year: Not on file    Number of [place] in the Last Year: Not on file    Unstable Housing in the Last Year: Not on file       Vitals:  BP (!) 124/56   Pulse 69   Temp 97.6 °F (36.4 °C) (Oral)   Resp 22   Ht 4' 11\" (1.499 m)   Wt 152 lb 8.9 oz (69.2 kg)   SpO2 98%   BMI 30.81 kg/m²   Temp (24hrs), Av.5 °F (36.4 °C), Min:97.4 °F (36.3 °C), Max:97.6 °F (36.4 °C)    Recent Labs     22  1211   POCGLU 91       I/O (24Hr):   No intake or output data in the 24 hours ending 22 1226    Labs:      CBC:   Lab Results   Component Value Date    WBC 6.9 2022    RBC 2.56 2022    HGB 7.7 2022    HCT 24.3 2022    MCV 95.0 2022    MCH 30.1 2022    MCHC 31.7 2022    RDW 16.0 2022     2022    MPV 8.7 2022     CBC with Differential:    Lab Results   Component Value Date    WBC 6.9 2022    RBC 2.56 2022    HGB 7.7 2022    HCT 24.3 2022     2022    MCV 95.0 2022    MCH 30.1 2022    MCHC 31.7 2022    RDW 16.0 2022    NRBC 4 2022    LYMPHOPCT 2 2022    MONOPCT 9 2022    BASOPCT 0 2022    MONOSABS 0.93 2022    LYMPHSABS 0.21 2022    EOSABS 0.10 2022    BASOSABS 0.00 2022    DIFFTYPE NOT REPORTED 2022     Hemoglobin/Hematocrit:    Lab Results   Component Value Date    HGB 7.7 03/07/2022    HCT 24.3 03/07/2022     CMP:    Lab Results   Component Value Date     03/07/2022    K 4.5 03/07/2022     03/07/2022    CO2 22 03/07/2022    BUN 72 03/07/2022    CREATININE 1.79 03/07/2022    GFRAA 34 03/07/2022    LABGLOM 28 03/07/2022    GLUCOSE 126 03/07/2022    PROT 4.3 03/05/2022    LABALBU 2.3 03/05/2022    CALCIUM 8.1 03/07/2022    BILITOT 0.29 03/05/2022    ALKPHOS 184 03/05/2022    AST 40 03/05/2022    ALT 25 03/05/2022     BMP:    Lab Results   Component Value Date     03/07/2022    K 4.5 03/07/2022     03/07/2022    CO2 22 03/07/2022    BUN 72 03/07/2022    LABALBU 2.3 03/05/2022    CREATININE 1.79 03/07/2022    CALCIUM 8.1 03/07/2022    GFRAA 34 03/07/2022    LABGLOM 28 03/07/2022    GLUCOSE 126 03/07/2022     PT/INR:    Lab Results   Component Value Date    PROTIME 16.0 02/06/2022    INR 1.3 02/06/2022     PTT:    Lab Results   Component Value Date    APTT 34.9 02/06/2022   [APTT}    Physical Examination:        General appearance: nonresponsive, on BiPAP  Mental Status: nonresponsive  Abdomen: soft, nontender, nondistended, bowel sounds present all four quadrants, no masses, hepatomegaly or splenomegaly  Extremities: no edema, redness or tenderness in the calves  Skin: no gross lesions, rashes, or induration    Assessment:        Primary Problem  Acute GI bleeding     Active Hospital Problems    Diagnosis Date Noted    Symptomatic anemia [D64.9]     Family history of colon cancer [Z80.0]     Family history of pancreatic cancer [Z80.0]     Elevated LFTs [R79.89] 02/25/2021    Paroxysmal atrial fibrillation ([facility] [I48.0] 07/28/2020    Stage 3 chronic kidney disease ([facility] [N18.30]      Past Medical History:   Diagnosis Date    Acute CVA (cerebrovascular accident) ([facility] 7/25/2020    Altered mental status 5/26/2021    Arthritis     Brain mass     Cellulitis and abscess     Cellulitis and abscess of unspecified site     Cellulitis of both lower extremities 5/26/2021    Cellulitis of left lower extremity 7/26/2020    Cellulitis of lower extremity 10/4/2020    CHF (congestive heart failure) (HCC)     Chronic kidney disease, unspecified     Coronary atherosclerosis of native coronary artery     Essential hypertension 7/25/2020    Essential hypertension, malignant     Hallucinations 2/18/2021    Hard of hearing     Head contusion 9/9/2020    Hypokalemia 9/9/2020    Iron deficiency anemia, unspecified     Meningioma ([facility] [address] 2/25/2021    [facility]     Other and unspecified hyperlipidemia     Pure hypercholesterolemia     Toxic metabolic encephalopathy 7/49/5802    Type II or unspecified type diabetes mellitus without mention of complication, not stated as uncontrolled     Unspecified asthma(493.90)     Unspecified venous (peripheral) insufficiency     Unspecified vitamin D deficiency     UTI (urinary tract infection) 2/18/2021        Plan:        1. BRIGIDO  1. No overt bleeding  2. Hgb 7.7  3. Continue Venofer  4. Continue PPI  5. Worsening respiratory status  6. Will plan GI investigations once status improves  7.  Will see as needed    Explained to the patient and d/W Nursing Staff  Will F/U with you  Please call or Page for any issues or change in status  Thanks    Electronically signed by MAL Foster NP on 3/7/2022 at 12:26 PM

## 2022-03-07 NOTE — PROGRESS NOTES
Physical Therapy        Physical Therapy Cancel Note      DATE: 3/7/2022    NAME: Meet Btuler  MRN: [id number]   : 1950      Patient not seen this date for Physical Therapy due to: Other: Defer PT this date due to pt transfer to ICU. Will continue to follow for PT services.        Electronically signed by Faheem Posada PTA on 3/7/2022 at 12:50 PM Quadrants Reporting?: 0

## 2022-03-07 NOTE — PROGRESS NOTES
[facility]   Progress Note                   Date:   3/7/2022  Patient name: Reynaldo Damon  Date of admission:  3/1/2022  9:47 PM  MRN:   [id number]  YOB: 1950  PCP: Marta George MD    Reason for Admission:  symptomatic anemia/GIB/Hypoxic reps failure     Subjective:       Patient is extremely drowsy this AM, did not wear BIPAP overnight, not responding to verbal and painful stimulus. On Bipap since 7 am this AM  Remains in NSR HR 60-70  CXR shows worsening right pleural effusion       Medications:   Scheduled Meds:   QUEtiapine  25 mg Oral Nightly    bumetanide  1 mg Oral Daily    ipratropium-albuterol  1 ampule Inhalation Q4H    sodium chloride flush  5-40 mL IntraVENous 2 times per day    allopurinol  300 mg Oral Daily    amLODIPine  10 mg Oral Daily    atorvastatin  40 mg Oral Daily    chlorhexidine   Topical Daily    [Held by provider] clopidogrel  75 mg Oral Daily    magnesium oxide  400 mg Oral BID    metoprolol tartrate  50 mg Oral BID    miconazole   Topical BID    therapeutic multivitamin-minerals  1 tablet Oral Daily    pantoprazole  40 mg IntraVENous Daily    And    sodium chloride flush  10 mL IntraVENous Daily    cetirizine HCl  5 mg Oral Daily       Continuous Infusions:   sodium chloride      sodium chloride         CBC:   Recent Labs     03/05/22  0524 03/05/22  1410 03/06/22  0421 03/06/22  0421 03/06/22  1346 03/06/22  2149 03/07/22  0553   WBC 5.9  --  10.3  --   --   --  6.9   HGB 6.9*   < > 7.8*   < > 7.4* 8.2* 7.7*     --  254  --   --   --  235    < > = values in this interval not displayed.      BMP:    Recent Labs     03/05/22  0524 03/06/22  0421 03/07/22  0553    140 137   K 4.1 4.3 4.5   * 106 104   CO2 20 21 22   BUN 71* 68* 72*   CREATININE 1.73* 1.69* 1.79*   GLUCOSE 79 142* 126*     Hepatic:   Recent Labs     03/05/22  0524   AST 40*   ALT 25   BILITOT 0.29*   ALKPHOS 184*     Troponin: No results for input(s): TROPONINI in the last 72 hours. BNP: No results for input(s): BNP in the last 72 hours. Lipids: No results for input(s): CHOL, HDL in the last 72 hours. Invalid input(s): LDLCALCU  INR: No results for input(s): INR in the last 72 hours. Objective:   Vitals: BP (!) 109/47   Pulse 64   Temp 97.6 °F (36.4 °C) (Oral)   Resp 20   Ht 4' 11\" (1.499 m)   Wt 152 lb 8.9 oz (69.2 kg)   SpO2 90%   BMI 30.81 kg/m²     General appearance: Drowsy, unresponsive to verbal and painful stimulus, on BiPAP  HEENT: Head: Normocephalic, no lesions, without obvious abnormality  Neck: no JVD  Lungs: Severely diminished breath sounds bilaterally, right greater than left  Heart: regular rate and rhythm, S1, S2 normal, no murmur appreciated  Abdomen: soft, non-tender; bowel sounds normal  Extremities: trace b/l ankle and LE edema  Neurologic: Mental status: Alert, oriented. Motor and sensory not done. EKG 3/1/2022: NSR, LVH, Inferior infarct, NS ST T changes. Previous cardiac testing:   CATH 7/21/06:   Patent LIMA to LAD and SVG to RCA.  Occluded SVG to OM 2.  Occluded RCA.      CABG with LIMA-LAD, SVG-OM and SVG-RCA in 2003.     TTE/CV July 2020  Summary:   1. A KARLA was performed without complications. 2. Normal LV size with normal LVEF. 3. No thrombus or valvular vegetation identified  4. Moderate atheromatous disease noted in aortic arch     CARDIOVERSION:  After an adequate level of sedation was achieved, 200J in biphasic synchronized delivery was administered. conversion to normal sinus rhythm.  The patient awoke without complications         Assessment:     Acute hypoxic hypercapnic respiratory failure  Severe anemia, likely from GI bleed  Acute on chronic systolic/diastolic combined CHF  History of ischemic cardiomyopathy with last LVEF 45% in 2012  Multivessel CAD status post CABG in 2003 (patent LIMA-LAD and SVG-RCA in 2006)  Paroxysmal atrial fibrillation, history of KARLA/CV in 2020, currently in NSR   CKD stage III  Essential hypertension    Patient Active Problem List:     Vitamin D deficiency     Venous insufficiency of both lower extremities     Asthma     Type 2 diabetes mellitus with stage 3 chronic kidney disease, without long-term current use of insulin (HCC)     Coronary artery disease involving native coronary artery of native heart without angina pectoris     Iron deficiency anemia     Stage 3 chronic kidney disease ([facility] [address] [facility]     Colonoscopy refused     Pneumococcal vaccination declined     Impaired hearing     Chronic diastolic CHF (congestive heart failure) (HCC)     COPD (chronic obstructive pulmonary disease) ([facility] [address] [facility]     HTN. Benign hypertension with CKD (chronic kidney disease) stage III (HCC)     Gout with tophi     Hyperlipidemia with target LDL less than 70     Dry skin dermatitis HANDS     Meningioma, cerebral (HCC)     Paroxysmal atrial fibrillation (HCC)     Influenza vaccination declined     Recurrent UTI     Elevated LFTs     Hypomagnesemia     Kidney stones     Diverticulosis     COVID-19 vaccination declined     Acute kidney injury ([facility] [address]     Acute on chronic combined systolic (congestive) and diastolic (congestive) heart failure (HCC)     Chronic a-fib (HCC)     Chronic venous stasis dermatitis of both lower extremities     Symptomatic anemia     Family history of colon cancer     Family history of pancreatic cancer        Treatment Plan:   1. Recommend stat ABG and transfer to ICU. Discussed with patient's RN. 2. Recommend IV Bumex if okay with nephrology  3. Might require right thoracentesis  4. Continue to hold Eliquis/Plavix  5. Hold amlodipine and dec lopressor to 25 mg bid  6. Follow-up with GI recommendations      Discussed with nursing.        Damion Malloy MD, [facility]

## 2022-03-07 NOTE — PROGRESS NOTES
Updated MD on ABGs. MD states that patient is to remain Bipap continuously. RN updated patient's daughters at this time at bedside. They are in agreement of current plan and that patient will need to remain on Bipap until Dr. Miley Ng says she can come off.

## 2022-03-08 ENCOUNTER — CLINICAL DOCUMENTATION (OUTPATIENT)
Dept: SPIRITUAL SERVICES | Age: 72
End: 2022-03-08

## 2022-03-08 ENCOUNTER — APPOINTMENT (OUTPATIENT)
Dept: GENERAL RADIOLOGY | Age: 72
DRG: 377 | End: 2022-03-08
Payer: OTHER GOVERNMENT

## 2022-03-08 PROBLEM — E44.1 MILD MALNUTRITION (HCC): Status: ACTIVE | Noted: 2022-03-08

## 2022-03-08 LAB
ALLEN TEST: NORMAL
ANION GAP SERPL CALCULATED.3IONS-SCNC: 12 MMOL/L (ref 9–17)
BUN BLDV-MCNC: 67 MG/DL (ref 8–23)
CALCIUM SERPL-MCNC: 7.8 MG/DL (ref 8.6–10.4)
CARBOXYHEMOGLOBIN: 0.9 % (ref 0–5)
CHLORIDE BLD-SCNC: 102 MMOL/L (ref 98–107)
CO2: 21 MMOL/L (ref 20–31)
CREAT SERPL-MCNC: 1.69 MG/DL (ref 0.5–0.9)
FIO2: 40
GFR AFRICAN AMERICAN: 36 ML/MIN
GFR NON-AFRICAN AMERICAN: 30 ML/MIN
GFR SERPL CREATININE-BSD FRML MDRD: ABNORMAL ML/MIN/{1.73_M2}
GLUCOSE BLD-MCNC: 96 MG/DL (ref 65–105)
GLUCOSE BLD-MCNC: 97 MG/DL (ref 70–99)
HCO3 ARTERIAL: 24.6 MMOL/L (ref 22–26)
HCT VFR BLD CALC: 24.9 % (ref 36–46)
HCT VFR BLD CALC: 25.3 % (ref 36–46)
HEMOGLOBIN: 8.2 G/DL (ref 12–16)
HEMOGLOBIN: 8.4 G/DL (ref 12–16)
MAGNESIUM: 2.2 MG/DL (ref 1.6–2.6)
MCH RBC QN AUTO: 30.7 PG (ref 26–34)
MCHC RBC AUTO-ENTMCNC: 33.1 G/DL (ref 31–37)
MCV RBC AUTO: 92.7 FL (ref 80–100)
METHEMOGLOBIN: 0.5 % (ref 0–1.9)
MODE: NORMAL
MRSA, DNA, NASAL: NEGATIVE
O2 DEVICE/FLOW/%: NORMAL
O2 SAT, ARTERIAL: 96.6 % (ref 95–98)
PATIENT TEMP: 37.1
PCO2 ARTERIAL: 35.4 MMHG (ref 35–45)
PDW BLD-RTO: 16.3 % (ref 11.5–14.9)
PEEP/CPAP: 7
PH ARTERIAL: 7.45 (ref 7.35–7.45)
PHOSPHORUS: 3.7 MG/DL (ref 2.6–4.5)
PLATELET # BLD: 231 K/UL (ref 150–450)
PMV BLD AUTO: 8.7 FL (ref 6–12)
PO2 ARTERIAL: 87.7 MMHG (ref 80–100)
POSITIVE BASE EXCESS, ART: 0.6 MMOL/L (ref 0–2)
POTASSIUM SERPL-SCNC: 3.9 MMOL/L (ref 3.7–5.3)
PT. POSITION: NORMAL
RBC # BLD: 2.69 M/UL (ref 4–5.2)
SAMPLE SITE: NORMAL
SET RATE: 20
SODIUM BLD-SCNC: 135 MMOL/L (ref 135–144)
SPECIMEN DESCRIPTION: NORMAL
TEXT FOR RESPIRATORY: NORMAL
VT: 500
WBC # BLD: 7.6 K/UL (ref 3.5–11)

## 2022-03-08 PROCEDURE — 94761 N-INVAS EAR/PLS OXIMETRY MLT: CPT

## 2022-03-08 PROCEDURE — 94640 AIRWAY INHALATION TREATMENT: CPT

## 2022-03-08 PROCEDURE — 99291 CRITICAL CARE FIRST HOUR: CPT | Performed by: INTERNAL MEDICINE

## 2022-03-08 PROCEDURE — 94003 VENT MGMT INPAT SUBQ DAY: CPT

## 2022-03-08 PROCEDURE — 71045 X-RAY EXAM CHEST 1 VIEW: CPT

## 2022-03-08 PROCEDURE — 6370000000 HC RX 637 (ALT 250 FOR IP): Performed by: INTERNAL MEDICINE

## 2022-03-08 PROCEDURE — 6360000002 HC RX W HCPCS: Performed by: INTERNAL MEDICINE

## 2022-03-08 PROCEDURE — 2580000003 HC RX 258: Performed by: INTERNAL MEDICINE

## 2022-03-08 PROCEDURE — 80048 BASIC METABOLIC PNL TOTAL CA: CPT

## 2022-03-08 PROCEDURE — 84100 ASSAY OF PHOSPHORUS: CPT

## 2022-03-08 PROCEDURE — 2700000000 HC OXYGEN THERAPY PER DAY

## 2022-03-08 PROCEDURE — 6360000002 HC RX W HCPCS

## 2022-03-08 PROCEDURE — 82805 BLOOD GASES W/O2 SATURATION: CPT

## 2022-03-08 PROCEDURE — 85018 HEMOGLOBIN: CPT

## 2022-03-08 PROCEDURE — 82947 ASSAY GLUCOSE BLOOD QUANT: CPT

## 2022-03-08 PROCEDURE — 2500000003 HC RX 250 WO HCPCS: Performed by: NURSE PRACTITIONER

## 2022-03-08 PROCEDURE — 83735 ASSAY OF MAGNESIUM: CPT

## 2022-03-08 PROCEDURE — 99232 SBSQ HOSP IP/OBS MODERATE 35: CPT | Performed by: INTERNAL MEDICINE

## 2022-03-08 PROCEDURE — C9113 INJ PANTOPRAZOLE SODIUM, VIA: HCPCS | Performed by: INTERNAL MEDICINE

## 2022-03-08 PROCEDURE — 36415 COLL VENOUS BLD VENIPUNCTURE: CPT

## 2022-03-08 PROCEDURE — 2500000003 HC RX 250 WO HCPCS: Performed by: INTERNAL MEDICINE

## 2022-03-08 PROCEDURE — 36600 WITHDRAWAL OF ARTERIAL BLOOD: CPT

## 2022-03-08 PROCEDURE — 85014 HEMATOCRIT: CPT

## 2022-03-08 PROCEDURE — 85027 COMPLETE CBC AUTOMATED: CPT

## 2022-03-08 PROCEDURE — 2000000000 HC ICU R&B

## 2022-03-08 PROCEDURE — APPSS30 APP SPLIT SHARED TIME 16-30 MINUTES: Performed by: NURSE PRACTITIONER

## 2022-03-08 PROCEDURE — 6370000000 HC RX 637 (ALT 250 FOR IP)

## 2022-03-08 RX ORDER — BUMETANIDE 0.25 MG/ML
2 INJECTION, SOLUTION INTRAMUSCULAR; INTRAVENOUS 3 TIMES DAILY
Status: DISCONTINUED | OUTPATIENT
Start: 2022-03-08 | End: 2022-03-17 | Stop reason: HOSPADM

## 2022-03-08 RX ORDER — POTASSIUM CHLORIDE 7.45 MG/ML
10 INJECTION INTRAVENOUS
Status: COMPLETED | OUTPATIENT
Start: 2022-03-08 | End: 2022-03-08

## 2022-03-08 RX ORDER — METOPROLOL TARTRATE 50 MG/1
50 TABLET, FILM COATED ORAL 2 TIMES DAILY
Status: CANCELLED | OUTPATIENT
Start: 2022-03-08

## 2022-03-08 RX ORDER — METOPROLOL TARTRATE 50 MG/1
50 TABLET, FILM COATED ORAL 2 TIMES DAILY
Status: DISCONTINUED | OUTPATIENT
Start: 2022-03-08 | End: 2022-03-08

## 2022-03-08 RX ORDER — METOPROLOL TARTRATE 5 MG/5ML
2.5 INJECTION INTRAVENOUS ONCE
Status: COMPLETED | OUTPATIENT
Start: 2022-03-08 | End: 2022-03-08

## 2022-03-08 RX ADMIN — POTASSIUM CHLORIDE 10 MEQ: 7.46 INJECTION, SOLUTION INTRAVENOUS at 15:47

## 2022-03-08 RX ADMIN — ANTI-FUNGAL POWDER MICONAZOLE NITRATE TALC FREE: 1.42 POWDER TOPICAL at 08:58

## 2022-03-08 RX ADMIN — IPRATROPIUM BROMIDE AND ALBUTEROL SULFATE 1 AMPULE: 2.5; .5 SOLUTION RESPIRATORY (INHALATION) at 03:34

## 2022-03-08 RX ADMIN — MEROPENEM 1000 MG: 1 INJECTION, POWDER, FOR SOLUTION INTRAVENOUS at 21:22

## 2022-03-08 RX ADMIN — MINERAL OIL, PETROLATUM: 425; 568 OINTMENT OPHTHALMIC at 02:00

## 2022-03-08 RX ADMIN — MAGNESIUM OXIDE TAB 400 MG (241.3 MG ELEMENTAL MG) 400 MG: 400 (241.3 MG) TAB at 07:15

## 2022-03-08 RX ADMIN — ANTI-FUNGAL POWDER MICONAZOLE NITRATE TALC FREE: 1.42 POWDER TOPICAL at 20:08

## 2022-03-08 RX ADMIN — ATORVASTATIN CALCIUM 40 MG: 40 TABLET, FILM COATED ORAL at 07:15

## 2022-03-08 RX ADMIN — POTASSIUM CHLORIDE 10 MEQ: 7.46 INJECTION, SOLUTION INTRAVENOUS at 14:40

## 2022-03-08 RX ADMIN — CETIRIZINE HYDROCHLORIDE 5 MG: 5 SOLUTION ORAL at 07:16

## 2022-03-08 RX ADMIN — METOPROLOL TARTRATE 25 MG: 25 TABLET, FILM COATED ORAL at 20:07

## 2022-03-08 RX ADMIN — IPRATROPIUM BROMIDE AND ALBUTEROL SULFATE 1 AMPULE: 2.5; .5 SOLUTION RESPIRATORY (INHALATION) at 23:55

## 2022-03-08 RX ADMIN — CHLORHEXIDINE GLUCONATE 15 ML: 1.2 RINSE ORAL at 20:07

## 2022-03-08 RX ADMIN — METOPROLOL TARTRATE 2.5 MG: 5 INJECTION INTRAVENOUS at 04:44

## 2022-03-08 RX ADMIN — CHLORHEXIDINE GLUCONATE 15 ML: 1.2 RINSE ORAL at 07:17

## 2022-03-08 RX ADMIN — BUMETANIDE 2 MG: 0.25 INJECTION, SOLUTION INTRAMUSCULAR; INTRAVENOUS at 14:35

## 2022-03-08 RX ADMIN — ACETAMINOPHEN 650 MG: 325 TABLET, FILM COATED ORAL at 16:17

## 2022-03-08 RX ADMIN — MULTIPLE VITAMINS W/ MINERALS TAB 1 TABLET: TAB at 07:15

## 2022-03-08 RX ADMIN — POLYVINYL ALCOHOL 1 DROP: 14 SOLUTION/ DROPS OPHTHALMIC at 06:00

## 2022-03-08 RX ADMIN — PROPOFOL 20 MCG/KG/MIN: 10 INJECTION, EMULSION INTRAVENOUS at 08:56

## 2022-03-08 RX ADMIN — IPRATROPIUM BROMIDE AND ALBUTEROL SULFATE 1 AMPULE: 2.5; .5 SOLUTION RESPIRATORY (INHALATION) at 11:28

## 2022-03-08 RX ADMIN — IPRATROPIUM BROMIDE AND ALBUTEROL SULFATE 1 AMPULE: 2.5; .5 SOLUTION RESPIRATORY (INHALATION) at 15:01

## 2022-03-08 RX ADMIN — ALLOPURINOL 300 MG: 300 TABLET ORAL at 07:15

## 2022-03-08 RX ADMIN — FENTANYL CITRATE 50 MCG: 50 INJECTION INTRAMUSCULAR; INTRAVENOUS at 19:55

## 2022-03-08 RX ADMIN — MINERAL OIL, PETROLATUM: 425; 568 OINTMENT OPHTHALMIC at 06:00

## 2022-03-08 RX ADMIN — SODIUM CHLORIDE, PRESERVATIVE FREE 10 ML: 5 INJECTION INTRAVENOUS at 20:07

## 2022-03-08 RX ADMIN — MAGNESIUM OXIDE TAB 400 MG (241.3 MG ELEMENTAL MG) 400 MG: 400 (241.3 MG) TAB at 20:07

## 2022-03-08 RX ADMIN — IPRATROPIUM BROMIDE AND ALBUTEROL SULFATE 1 AMPULE: 2.5; .5 SOLUTION RESPIRATORY (INHALATION) at 19:44

## 2022-03-08 RX ADMIN — SODIUM CHLORIDE, PRESERVATIVE FREE 10 ML: 5 INJECTION INTRAVENOUS at 07:17

## 2022-03-08 RX ADMIN — MINERAL OIL, PETROLATUM: 425; 568 OINTMENT OPHTHALMIC at 22:00

## 2022-03-08 RX ADMIN — BUMETANIDE 2 MG: 0.25 INJECTION, SOLUTION INTRAMUSCULAR; INTRAVENOUS at 20:12

## 2022-03-08 RX ADMIN — IPRATROPIUM BROMIDE AND ALBUTEROL SULFATE 1 AMPULE: 2.5; .5 SOLUTION RESPIRATORY (INHALATION) at 07:49

## 2022-03-08 RX ADMIN — POLYVINYL ALCOHOL 1 DROP: 14 SOLUTION/ DROPS OPHTHALMIC at 22:00

## 2022-03-08 RX ADMIN — PANTOPRAZOLE SODIUM 40 MG: 40 INJECTION, POWDER, FOR SOLUTION INTRAVENOUS at 07:15

## 2022-03-08 RX ADMIN — POLYVINYL ALCOHOL 1 DROP: 14 SOLUTION/ DROPS OPHTHALMIC at 02:00

## 2022-03-08 RX ADMIN — FENTANYL CITRATE 50 MCG: 50 INJECTION INTRAMUSCULAR; INTRAVENOUS at 13:05

## 2022-03-08 RX ADMIN — BUMETANIDE 2 MG: 0.25 INJECTION INTRAMUSCULAR; INTRAVENOUS at 08:58

## 2022-03-08 RX ADMIN — MEROPENEM 1000 MG: 1 INJECTION, POWDER, FOR SOLUTION INTRAVENOUS at 09:03

## 2022-03-08 RX ADMIN — SODIUM CHLORIDE, PRESERVATIVE FREE 10 ML: 5 INJECTION INTRAVENOUS at 08:59

## 2022-03-08 RX ADMIN — CHLORHEXIDINE GLUCONATE: 213 SOLUTION TOPICAL at 08:58

## 2022-03-08 ASSESSMENT — PULMONARY FUNCTION TESTS
PIF_VALUE: 24
PIF_VALUE: 24
PIF_VALUE: 23
PIF_VALUE: 23
PIF_VALUE: 17
PIF_VALUE: 17
PIF_VALUE: 23
PIF_VALUE: 22
PIF_VALUE: 24
PIF_VALUE: 17
PIF_VALUE: 24
PIF_VALUE: 20
PIF_VALUE: 24
PIF_VALUE: 22
PIF_VALUE: 23
PIF_VALUE: 25
PIF_VALUE: 26
PIF_VALUE: 24
PIF_VALUE: 24
PIF_VALUE: 22
PIF_VALUE: 25
PIF_VALUE: 25
PIF_VALUE: 17
PIF_VALUE: 24
PIF_VALUE: 20
PIF_VALUE: 23
PIF_VALUE: 25
PIF_VALUE: 24
PIF_VALUE: 20
PIF_VALUE: 25
PIF_VALUE: 24
PIF_VALUE: 24
PIF_VALUE: 23
PIF_VALUE: 17
PIF_VALUE: 24
PIF_VALUE: 17
PIF_VALUE: 25
PIF_VALUE: 22
PIF_VALUE: 24
PIF_VALUE: 17
PIF_VALUE: 16
PIF_VALUE: 25
PIF_VALUE: 24
PIF_VALUE: 25
PIF_VALUE: 24
PIF_VALUE: 17
PIF_VALUE: 25
PIF_VALUE: 24
PIF_VALUE: 24
PIF_VALUE: 23
PIF_VALUE: 31
PIF_VALUE: 23
PIF_VALUE: 25
PIF_VALUE: 24
PIF_VALUE: 25
PIF_VALUE: 24
PIF_VALUE: 25
PIF_VALUE: 17
PIF_VALUE: 24
PIF_VALUE: 24
PIF_VALUE: 17
PIF_VALUE: 19
PIF_VALUE: 24
PIF_VALUE: 25
PIF_VALUE: 25

## 2022-03-08 ASSESSMENT — PAIN SCALES - GENERAL
PAINLEVEL_OUTOF10: 4
PAINLEVEL_OUTOF10: 0
PAINLEVEL_OUTOF10: 0
PAINLEVEL_OUTOF10: 4
PAINLEVEL_OUTOF10: 2
PAINLEVEL_OUTOF10: 0

## 2022-03-08 NOTE — ACP (ADVANCE CARE PLANNING)
Advance Care Planning   Ambulatory ACP Specialist Patient Outreach    Date:  3/8/2022  ACP Specialist:  Debbie Lozano    Outreach call to patient in follow-up to ACP Specialist referral from: Ortega Garcia MD    [] PCP  [] Provider   [] Ambulatory Care Management [] Other for Reason:    [] Advance Directive Assistance  [] Code Status Discussion  [] Complete Portable DNR Order  [] Discuss Goals of Care  [] Complete POST/MOST  [] Early ACP Decision-Making  [] Other    Date Referral Received:11/24/21    Today's Outreach:  [] First   [] Second  [x] Third                               Third outreach made by [x]  phone  [] email []   [employer]uz[employer]     Intervention:  [] Spoke with Patient's Daughter  [] Left  requesting return call      Outcome: Spoke with Patients Daughter Santos Bruner who states Patient is currently at [facility] in ICU Room 2008. Daughter states Patient is on a Vent but still has Capacity and is awake and able to communicate. Will Outreach to [facility] for further follow up. Next Step:   [] ACP scheduled conversation  [] Outreach again in one week               [] Email / Mail ACP Info Sheets  [] Email / Mail Advance Directive            [] Close Referral. Routing closure to referring provider/staff and to ACP Specialist .      Thank you for this referral.

## 2022-03-08 NOTE — PROGRESS NOTES
[facility]   Progress Note                   Date:   3/8/2022  Patient name: Bessy Carroll  Date of admission:  3/1/2022  9:47 PM  MRN:   [id number]  YOB: 1950  PCP: Nan Vela MD    Reason for Admission:  symptomatic anemia/GIB/Hypoxic reps failure     Subjective:       Reviewed chart and overnight issues. Medications:   Scheduled Meds:   [Held by provider] metoprolol tartrate  50 mg Oral BID    bumetanide  2 mg IntraVENous TID    polyvinyl alcohol  1 drop Both Eyes Q4H    And    artificial tears   Both Eyes Q4H    chlorhexidine  15 mL Mouth/Throat BID    meropenem  1,000 mg IntraVENous Q12H    ipratropium-albuterol  1 ampule Inhalation Q4H    sodium chloride flush  5-40 mL IntraVENous 2 times per day    allopurinol  300 mg Oral Daily    atorvastatin  40 mg Oral Daily    chlorhexidine   Topical Daily    [Held by provider] clopidogrel  75 mg Oral Daily    magnesium oxide  400 mg Oral BID    miconazole   Topical BID    therapeutic multivitamin-minerals  1 tablet Oral Daily    pantoprazole  40 mg IntraVENous Daily    And    sodium chloride flush  10 mL IntraVENous Daily    cetirizine HCl  5 mg Oral Daily       Continuous Infusions:   propofol 10 mcg/kg/min (03/08/22 1308)    sodium chloride      sodium chloride         CBC:   Recent Labs     03/06/22  0421 03/06/22  1346 03/07/22  0553 03/07/22  2216 03/08/22  0432   WBC 10.3  --  6.9  --  7.6   HGB 7.8*   < > 7.7* 7.8* 8.2*     --  235  --  231    < > = values in this interval not displayed. BMP:    Recent Labs     03/06/22  0421 03/07/22  0553 03/08/22  0432    137 135   K 4.3 4.5 3.9    104 102   CO2 21 22 21   BUN 68* 72* 67*   CREATININE 1.69* 1.79* 1.69*   GLUCOSE 142* 126* 97     Hepatic:   No results for input(s): AST, ALT, ALB, BILITOT, ALKPHOS in the last 72 hours. Troponin: No results for input(s): TROPONINI in the last 72 hours.   BNP: No results for input(s): BNP in the last 72 hours. Lipids: No results for input(s): CHOL, HDL in the last 72 hours. Invalid input(s): LDLCALCU  INR: No results for input(s): INR in the last 72 hours. Objective:   Vitals: BP (!) 158/41   Pulse 100   Temp 97.9 °F (36.6 °C) (Oral)   Resp 19   Ht 4' 11\" (1.499 m)   Wt 152 lb 8.9 oz (69.2 kg)   SpO2 100%   BMI 30.81 kg/m²   Constitutional and General Appearance: intubated sedated  HEENT: sedated  Respiratory:  · Vent sounds noted  · Clear anteriorly  Cardiovascular:  · Heart tones are crisp and normal. regular S1 and S2.  · Jugular venous pulsation Normal  · Peripheral pulses are symmetrical and full   Abdomen:   · Soft  · No masses or tenderness  Extremities:  ·  No Cyanosis or Clubbing  ·  Lower extremity edema: No  ·  Skin: Warm and dry  Neurological:  · Sedated        EKG 3/1/2022: NSR, LVH, Inferior infarct, NS ST T changes. Previous cardiac testing:   CATH 7/21/06:   Patent LIMA to LAD and SVG to RCA.  Occluded SVG to OM 2.  Occluded RCA.      CABG with LIMA-LAD, SVG-OM and SVG-RCA in 2003.     TTE/CV July 2020  Summary:   1. A KARLA was performed without complications. 2. Normal LV size with normal LVEF. 3. No thrombus or valvular vegetation identified  4. Moderate atheromatous disease noted in aortic arch     CARDIOVERSION:  After an adequate level of sedation was achieved, 200J in biphasic synchronized delivery was administered. conversion to normal sinus rhythm.  The patient awoke without complications     Patient Active Problem List:     Vitamin D deficiency     Venous insufficiency of both lower extremities     Asthma     Type 2 diabetes mellitus with stage 3 chronic kidney disease, without long-term current use of insulin (HCC)     Coronary artery disease involving native coronary artery of native heart without angina pectoris     Iron deficiency anemia     Stage 3 chronic kidney disease ([facility] [address]     Colonoscopy refused     Pneumococcal vaccination declined     Impaired hearing     Chronic diastolic CHF (congestive heart failure) (HCC)     COPD (chronic obstructive pulmonary disease) (HCC)     HTN. Benign hypertension with CKD (chronic kidney disease) stage III (HCC)     Gout with tophi     Hyperlipidemia with target LDL less than 70     Dry skin dermatitis HANDS     Meningioma, cerebral (HCC)     Paroxysmal atrial fibrillation (HCC)     Influenza vaccination declined     Recurrent UTI     Elevated LFTs     Hypomagnesemia     Kidney stones     Diverticulosis     COVID-19 vaccination declined     Acute kidney injury ([facility] [address]     Acute on chronic combined systolic (congestive) and diastolic (congestive) heart failure (HCC)     Chronic a-fib (HCC)     Chronic venous stasis dermatitis of both lower extremities     Symptomatic anemia     Family history of colon cancer     Family history of pancreatic cancer        Assessment & Plan:     Acute hypoxic hypercapnic respiratory failure- on vent  Severe anemia, likely from GI bleed- no AC. GI following  Acute on chronic systolic/diastolic combined CHF- optimize meds  History of ischemic cardiomyopathy with last LVEF 45% in 2012- stable  Multivessel CAD status post CABG in 2003 (patent LIMA-LAD and SVG-RCA in 2006)- stable  Paroxysmal atrial fibrillation, history of KARLA/CV in 2020, currently in NSR - coptimize meds  CKD stage III- per nephro  Essential hypertension- stable, chronic      Discussed with nursing. Thank you for allowing me to participate in the care of this patient, please do not hesitate to call if you have any questions. Kalee Hamm, [id number] [facility]  [email or web address]  [phone]

## 2022-03-08 NOTE — PROGRESS NOTES
Department of Internal Medicine  Nephrology Juana Renteria MD   progress note    Reason for consultation: Management of acute kidney injury superimposed on chronic kidney disease stage III. Subjective/interval history:    Patient seen and examined in ICU patient was intubated and was transferred to ICU yesterday  Patient is on ventilator  Serum creatinine stable at 1.6 mg/dL  Urine output noted with external catheter 850 mL urine output  Hemoglobin improved 8.2  Chest x-ray showed increased congestive changes small to moderate bilateral pleural effusion    History of present illness: This is a 66 y.o. female with a significant past medical history of Heart Failure with reduced ejection fraction [HFrEF with LVEF 45 to 50% in July 2020 secondary to ischemic cardiomyopathy], Coronary artery disease, Meningioma, type 2 diabetes mellitus [with nonproliferative diabetic retinopathy], essential hypertension, bilateral deafness and chronic kidney disease stage III [baseline serum creatinine 1.6 mg/dL and follows up with Dr. Jason Clark of [facility] of [place] whom she saw 1 week ago and was switched from Lasix to Bumex], presented with complaints of fatigue and dyspnea with exertion progressively worsened over 2 weeks. She had been recently admitted and seen by us at [facility] on 2/11/2022. Laboratory studies at presentation however were remarkable for hemoglobin 7.4 g/dL and BUN/creatinine 110/2.11 mg/dL. She denies hemoptysis or melena stools. However, hemoglobin dropped overnight to 4.4 g/dL and she is scheduled for endoscopy tomorrow.     Latex, Aleve [naproxen sodium], Pioglitazone, Claritin [loratadine], Keflex [cephalexin], and Lisinopril    Past Medical History:   Diagnosis Date    Acute CVA (cerebrovascular accident) ([facility] [address] 7/25/2020    Altered mental status 5/26/2021    Arthritis     Brain mass     Cellulitis and abscess     Cellulitis and abscess of unspecified site     Cellulitis of both lower extremities 5/26/2021    Cellulitis of left lower extremity 7/26/2020    Cellulitis of lower extremity 10/4/2020    CHF (congestive heart failure) (HCC)     Chronic kidney disease, unspecified     Coronary atherosclerosis of native coronary artery     Essential hypertension 7/25/2020    Essential hypertension, malignant     Hallucinations 2/18/2021    Hard of hearing     Head contusion 9/9/2020    Hypokalemia 9/9/2020    Iron deficiency anemia, unspecified     Meningioma ([address] 2/25/2021    Myocardial infarction (HCC)     Other and unspecified hyperlipidemia     Pure hypercholesterolemia     Toxic metabolic encephalopathy 3/08/9472    Type II or unspecified type diabetes mellitus without mention of complication, not stated as uncontrolled     Unspecified asthma(493.90)     Unspecified venous (peripheral) insufficiency     Unspecified vitamin D deficiency     UTI (urinary tract infection) 2/18/2021       Scheduled Meds:   metoprolol tartrate  50 mg Oral BID    bumetanide  2 mg IntraVENous BID    polyvinyl alcohol  1 drop Both Eyes Q4H    And    artificial tears   Both Eyes Q4H    chlorhexidine  15 mL Mouth/Throat BID    meropenem  1,000 mg IntraVENous Q12H    ipratropium-albuterol  1 ampule Inhalation Q4H    sodium chloride flush  5-40 mL IntraVENous 2 times per day    allopurinol  300 mg Oral Daily    atorvastatin  40 mg Oral Daily    chlorhexidine   Topical Daily    [Held by provider] clopidogrel  75 mg Oral Daily    magnesium oxide  400 mg Oral BID    miconazole   Topical BID    therapeutic multivitamin-minerals  1 tablet Oral Daily    pantoprazole  40 mg IntraVENous Daily    And    sodium chloride flush  10 mL IntraVENous Daily    cetirizine HCl  5 mg Oral Daily     Continuous Infusions:   propofol 20 mcg/kg/min (03/08/22 0856)    sodium chloride      sodium chloride       PRN Meds:.fentanNYL, sodium chloride flush, sodium chloride, ondansetron **OR** ondansetron, magnesium hydroxide, acetaminophen **OR** acetaminophen, sodium chloride        Physical Exam:    VITALS:  BP (!) 154/48   Pulse 110   Temp 98.4 °F (36.9 °C) (Oral)   Resp 25   Ht 4' 11\" (1.499 m)   Wt 152 lb 8.9 oz (69.2 kg)   SpO2 100%   BMI 30.81 kg/m²     Constitutional: Patient is sedated, intubated on ventilator    Skin: Skin color, texture, turgor normal. No rashes or lesions    Head: Normocephalic, without obvious abnormality, atraumatic     Cardiovascular/Edema :regular rate and rhythm    Respiratory: Lungs: Bilateral chest rise on ventilator    Abdomen: Soft nontender    Back: symmetric, no curvature. ROM normal. No CVA tenderness. Extremities: 1-2+ edema bilaterally    Neuro:  Grossly normal      CBC:   Recent Labs     03/06/22 0421 03/06/22  1346 03/07/22  0553 03/07/22  2216 03/08/22  0432   WBC 10.3  --  6.9  --  7.6   HGB 7.8*   < > 7.7* 7.8* 8.2*     --  235  --  231    < > = values in this interval not displayed. BMP:    Recent Labs     03/06/22  0421 03/07/22  0553 03/08/22  0432    137 135   K 4.3 4.5 3.9    104 102   CO2 21 22 21   BUN 68* 72* 67*   CREATININE 1.69* 1.79* 1.69*   GLUCOSE 142* 126* 97       Lab Results   Component Value Date    NITRU NEGATIVE 02/22/2022    COLORU Yellow 02/22/2022    PHUR 5.0 02/22/2022    WBCUA 10 TO 20 02/22/2022    RBCUA TOO NUMEROUS TO COUNT 02/22/2022    MUCUS NOT REPORTED 02/07/2022    TRICHOMONAS NOT REPORTED 02/07/2022    YEAST NOT REPORTED 02/07/2022    BACTERIA None 02/22/2022    SPECGRAV 1.016 02/22/2022    LEUKOCYTESUR NEGATIVE 02/22/2022    UROBILINOGEN Normal 02/22/2022    BILIRUBINUR NEGATIVE 02/22/2022    GLUCOSEU TRACE 02/22/2022    KETUA NEGATIVE 02/22/2022    AMORPHOUS NOT REPORTED 02/07/2022     Urine Protein:     Lab Results   Component Value Date     10/27/2020     Urine Creatinine:     Lab Results   Component Value Date    LABCREA 26.2 05/26/2021     Imaging studies.   Chest x-ray increasing congestive changes small-to-moderate bilateral pleural effusion      IMPRESSION/RECOMMENDATIONS:      1. Acute kidney injury superimposed on chronic kidney disease stage III most consistent with prerenal azotemia led to dehydration and anemia. Serum creatinine stable, serum creatinine 1.6 mg/dL      2. Systemic hypertension controlled. 3.  Acute on chronic anemia - rule out GI bleed. Plavix is on hold. 4.  CHF with preserved EF moderate left ventricular hypertrophy diastolic dysfunction, mild to moderate aortic regurgitation, proBNP increased from 8,545--39,998    Plan:    Continue diuresis Bumex IV 2 mg Bumex twice daily  Prognosis is guarded. Thank you very much for the courtesy and confidence of this consultation.     Delgado Noyola MD   Attending Nephrologist  3/8/2022 12:29 PM

## 2022-03-08 NOTE — PROGRESS NOTES
ICU Progress Note (Vent)   [facility]    Patient - Damaso Bashir,  Age - 66 y.o.    - 1950      Room Number -    N -  [id number]   Acct # - [de-identified]  Date of Admission -  3/1/2022  9:47 PM    Events of Past 24 Hours   Episode of bradycardia this a.m. with his pause. Overnight, went into atrial fibrillation but spontaneously converted. This a.m., sedated does not follow any commands for me. When sedation is off she opens her eyes. She is hard of hearing. Vitals    height is 4' 11\" (1.499 m) and weight is 152 lb 8.9 oz (69.2 kg). Her oral temperature is 98.4 °F (36.9 °C). Her blood pressure is 92/78 and her pulse is 101. Her respiration is 20 and oxygen saturation is 99%. Temperature Range: Temp: 98.4 °F (36.9 °C) Temp  Av.8 °F (36.6 °C)  Min: 96.1 °F (35.6 °C)  Max: 98.8 °F (37.1 °C)  BP Range:  Systolic (00BQT), HVR:386 , Min:87 , WRC:419     Diastolic (71TME), FFW:68, Min:23, Max:78    Pulse Range: Pulse  Av.3  Min: 65  Max: 117  Respiration Range: Resp  Av.1  Min: 15  Max: 28  Current Pulse Ox[de-identified]  SpO2: 99 %  24HR Pulse Ox Range:  SpO2  Av.4 %  Min: 95 %  Max: 100 %  Oxygen Amount and Delivery: O2 Flow Rate (L/min): 3 L/min      Wt Readings from Last 3 Encounters:   22 152 lb 8.9 oz (69.2 kg)   22 156 lb 1.4 oz (70.8 kg)   22 143 lb 6.4 oz (65 kg)     I/O       Intake/Output Summary (Last 24 hours) at 3/8/2022 1037  Last data filed at 3/8/2022 0800  Gross per 24 hour   Intake 423.12 ml   Output 850 ml   Net -426.88 ml     I/O last 3 completed shifts:   In: 363.1 [I.V.:143.4; NG/GT:120; IV Piggyback:99.7]  Out: 850 [Urine:850]     DRAIN/TUBE OUTPUT:     Invasive Lines   ETT Day -   2  Lines -  0    ICP PRESSURE RANGE:  No data recorded  CVP PRESSURE RANGE:  No data recorded  Mechanical Ventilation Data   SETTINGS (Comprehensive)  Vent Information  $Ventilation: $Subsequent Day  Skin Assessment: Clean, dry, & intact  Suction Catheter Diameter: 14  Equipment Changed: HME  Vent Type: Servo i  Vent Mode: PRVC  Vt Ordered: 500 mL  Rate Set: 20 bmp  FiO2 : 40 %  SpO2: 99 %  SpO2/FiO2 ratio: 247.5  Sensitivity: 5  PEEP/CPAP: 7  I Time/ I Time %: 0.9 s  Humidification Source: HME  Mask Type: Total face  Mask Size: Large  Additional Respiratory  Assessments  Pulse: 101  Resp: 20  SpO2: 99 %  End Tidal CO2: 27 (%)  Humidification Source: HME  Oral Care: Mouthwash with chlorhexidine  Cuff Pressure (cm H2O): 27 cm H2O       ABGs:   Lab Results   Component Value Date    PHART 7.450 03/08/2022    PO2ART 87.7 03/08/2022    KHZ2SUE 35.4 03/08/2022       Lab Results   Component Value Date    MODE PRVC 03/08/2022         Medications   IV   propofol 20 mcg/kg/min (03/08/22 0856)    sodium chloride      sodium chloride        metoprolol tartrate  50 mg Oral BID    bumetanide  2 mg IntraVENous BID    polyvinyl alcohol  1 drop Both Eyes Q4H    And    artificial tears   Both Eyes Q4H    chlorhexidine  15 mL Mouth/Throat BID    vancomycin (VANCOCIN) intermittent dosing (placeholder)   Other RX Placeholder    meropenem  1,000 mg IntraVENous Q12H    ipratropium-albuterol  1 ampule Inhalation Q4H    sodium chloride flush  5-40 mL IntraVENous 2 times per day    allopurinol  300 mg Oral Daily    atorvastatin  40 mg Oral Daily    chlorhexidine   Topical Daily    [Held by provider] clopidogrel  75 mg Oral Daily    magnesium oxide  400 mg Oral BID    miconazole   Topical BID    therapeutic multivitamin-minerals  1 tablet Oral Daily    pantoprazole  40 mg IntraVENous Daily    And    sodium chloride flush  10 mL IntraVENous Daily    cetirizine HCl  5 mg Oral Daily       Diet/Nutrition   Diet NPO Exceptions are: Sips of Water with Meds    Exam   VITALS    height is 4' 11\" (1.499 m) and weight is 152 lb 8.9 oz (69.2 kg). Her oral temperature is 98.4 °F (36.9 °C).  Her blood pressure is 92/78 and her pulse is 101. Her respiration is 20 and oxygen saturation is 99%. Ventilator Settings (Basic)  Vent Mode: PRVC Rate Set: 20 bmp/Vt Ordered: 500 mL/ /FiO2 : 40 %    Constitutional - Sedated  General Appearance  well developed, well nourished  HEENT - Life support devices in place (ET, OG),normocephalic, atraumatic. PERRLA  Lungs - Chest expands equally, no wheezes, diminished with bilateral crackles  Cardiovascular - Heart sounds are normal.  normal rate and rhythm regular, no murmur, gallop or rub. Abdomen - soft, nontender, nondistended, no masses or organomegaly  Neurologic - CN II-XII are grossly intact. There are no focal motor deficits patient is very hard of hearing  Skin - no bruising or bleeding  Extremities - no cyanosis, clubbing; +edema    Lab Results   CBC     Lab Results   Component Value Date    WBC 7.6 03/08/2022    RBC 2.69 03/08/2022    HGB 8.2 03/08/2022    HCT 24.9 03/08/2022     03/08/2022    MCV 92.7 03/08/2022    MCH 30.7 03/08/2022    MCHC 33.1 03/08/2022    RDW 16.3 03/08/2022    NRBC 4 03/06/2022    LYMPHOPCT 2 03/06/2022    MONOPCT 9 03/06/2022    BASOPCT 0 03/06/2022    MONOSABS 0.93 03/06/2022    LYMPHSABS 0.21 03/06/2022    EOSABS 0.10 03/06/2022    BASOSABS 0.00 03/06/2022    DIFFTYPE NOT REPORTED 02/06/2022       BMP   Lab Results   Component Value Date     03/08/2022    K 3.9 03/08/2022     03/08/2022    CO2 21 03/08/2022    BUN 67 03/08/2022    CREATININE 1.69 03/08/2022    GLUCOSE 97 03/08/2022    CALCIUM 7.8 03/08/2022       LFTS  Lab Results   Component Value Date    ALKPHOS 184 03/05/2022    ALT 25 03/05/2022    AST 40 03/05/2022    PROT 4.3 03/05/2022    BILITOT 0.29 03/05/2022    BILIDIR 0.13 03/05/2022    IBILI 0.16 03/05/2022    LABALBU 2.3 03/05/2022       INR  No results for input(s): PROTIME, INR in the last 72 hours. APTT  No results for input(s): APTT in the last 72 hours.     Lactic Acid  Lab Results   Component Value Date    LACTA 0.7 03/04/2022    LACTA 1.2 02/18/2021    LACTA 1.8 07/25/2020        BNP   No results for input(s): BNP in the last 72 hours. Cultures       Radiology     Plain Films         Chest x-ray shows slight improvement in bilateral infiltrates endotracheal tube in slightly lower position        SYSTEM ASSESSMENT      Acute respiratory failure, hypoxic and hypercapnic, intubated 3/7  Acute on chronic diastolic heart failure with worsening bilateral effusions  COPD, clinical diagnosis no PFTs, no pulmonology  History of tobacco use 30+ pack year  Nonmorbid obesity  AZIZA clinical diagnosis  Chronic kidney disease  Anemia  Full CODE STATUS      Neuro   CT of brain was negative for any acute abnormalities  Minimize sedation    Respiratory   Wean oxygen as tolerated.  Keep O2 sat > 88%  No objection to weaning we will see what her mental status is before extubation  Decrease rate on ventilator  Retract endotracheal tube 1 cm     Hemodynamics   Appears to be in fluid overload state  About 1.7 L since admission  Spontaneously converted out of atrial fibrillation    Gastrointestinal/Nutrition   Start tube feeds if we cannot extubate  GI prophylaxis    Renal   Check magnesium and phosphate  Continue diuresis  Replace potassium  Remains on Bumex    Infectious Disease   Discontinue vancomycin, MRSA swab is negative  Eli on Merrem awaiting sputum culture result  Hematology/Oncology   Hemoglobin stable, would place on DVT prophylaxis at the very least    Endocrine   Monitor blood sugars    Social/Spiritual/DNR/Disposition/Other     Updated daughters in detail at bedside    Critical Care Time   35 min    Electronically signed by Zahra Angel MD on 3/8/2022 at 10:37 AM

## 2022-03-08 NOTE — PROGRESS NOTES
Comprehensive Nutrition Assessment    Type and Reason for Visit:  Reassess    Nutrition Recommendations/Plan: Start Osmolite 1.5 at 15 ml increase as tolerated to 40 ml/hr, monitor for tolerance. Nutrition Assessment:  Yesterday pt was on PCU and started having breathing irregularity and not responsive. Dr. Huber Cardoso called to evaluate pt and electively intubated to protect airway. Today family states pt's oral intake has been very poor, only taking some liquids. Dr. Huber Cardoso ok with starting tube feedings. Pt is on Propofol at 8.3 ml providin kcals. Pt with CHF and Pro-BNP trending up, ordering Osmolite 1.5 goal rate ot 40 ml/hr to provide: 1440 kcals, 60 gm protein. Malnutrition Assessment:  Malnutrition Status:  Mild malnutrition    Context:  Acute Illness     Findings of the 6 clinical characteristics of malnutrition:  Energy Intake:  1 - 75% or less of estimated energy requirements for 7 or more days  Weight Loss:  Unable to assess (edema present)     Body Fat Loss:  Unable to assess     Muscle Mass Loss:  Unable to assess    Fluid Accumulation:  1 - Mild Generalized   Strength:  Not Performed    Estimated Daily Nutrient Needs:  Energy (kcal):  3985-1784 kcals based on 25-27 kcals/kg using adm wt 61 kg; Weight Used for Energy Requirements:  Admission     Protein (g):  69-77 gm protein based on 1.6-1.8 gm/kg using IBW.; Weight Used for Protein Requirements:  Ideal          Nutrition Related Findings:  Edema: +2 all extremities, +1 facial, +2 perineal. BM- 3-8. Labs & meds reviewed. Wounds:   (Excoriation)       Current Nutrition Therapies:    Diet NPO Exceptions are: Sips of Water with Meds  ADULT TUBE FEEDING; Orogastric; Other Tube Feeding (specify); Osmolite 1.5; Continuous; 15; Yes; 10; Q 4 hours; 40; 30; Other (specify); no water flushes  Current Tube Feeding (TF) Orders:  · Feeding Route: Orogastric  · Formula:  Other Tube Feeding (Comment) (Osmolite 1.5)  · Schedule: Continuous  · Goal TF & Flush Orders Provides: 1440 kcals, 60 gm protein      Anthropometric Measures:  · Height: 4' 11\" (149.9 cm)  · Current Body Weight: 152 lb 12.5 oz (69.3 kg) (positive fluid balance)   · Admission Body Weight: 135 lb (61.2 kg)    · Ideal Body Weight: 95 lbs; % Ideal Body Weight 142.1 %   · BMI: 30.8  · BMI Categories: Overweight (BMI 25.0-29. 9) (Based on admission wt)       Nutrition Diagnosis:   · Mild malnutrition related to impaired respiratory function,inadequate protein-energy intake as evidenced by NPO or clear liquid status due to medical condition,intubation      Nutrition Interventions:   Food and/or Nutrient Delivery:  Continue NPO,Start Tube Feeding  Nutrition Education/Counseling:  No recommendation at this time   Coordination of Nutrition Care:  Continue to monitor while inpatient    Goals:  Meet estimated nutrient needs       Nutrition Monitoring and Evaluation:   Behavioral-Environmental Outcomes:  None Identified   Food/Nutrient Intake Outcomes:  Enteral Nutrition Intake/Tolerance  Physical Signs/Symptoms Outcomes:  Biochemical Data,GI Status,Fluid Status or Edema,Nutrition Focused Physical Findings,Skin,Weight     Discharge Planning: Too soon to determine     Some areas of assessment may be incomplete due to COVID-19 precautions. RODNEY Neri R.D..   Clinical Dietitian  Office: [phone]

## 2022-03-08 NOTE — PLAN OF CARE
Problem: OXYGENATION/RESPIRATORY FUNCTION  Goal: Patient will maintain patent airway  Outcome: Ongoing  Goal: Patient will achieve/maintain normal respiratory rate/effort  Description: Respiratory rate and effort will be within normal limits for the patient  Outcome: Ongoing     Problem: MECHANICAL VENTILATION  Goal: Patient will maintain patent airway  Outcome: Ongoing  Goal: ET tube will be managed safely  Outcome: Ongoing

## 2022-03-08 NOTE — PROGRESS NOTES
[address] [facility] [employer]    PROGRESS NOTE             3/8/2022    7:26 AM    Name:   Rey Rossi  MRN:     [id number]     Acct:      [de-identified]   Room:   2008/2008-01  IP Day:  6  Admit Date:  3/1/2022  9:47 PM    PCP:  Zafar Aguayo MD  Code Status:  Full Code    Subjective: This is a delayed entry note and reflect's the patient's condition and management plan at time of service. C/C:   Chief Complaint   Patient presents with    Fatigue     Interval History Status: not changed. Patient was seen and examined. Daughter at bedside. Altered mentation with decreased respiratory status yesterday; Was intubated and transferred to ICU. On physical exam this a.m., she is intubated, sedated,  Unresponsive on propofol. A. fib with RVR on telemetry observed throughout morning,  Then subsequent sinus pause on EKG during second encounter during encounter  Cardiology on case. Brief History:     Rey Rossi is a 69-year-old female with past medical history of CKD Stage III, asthma, impaired hearing, COPD (has never been on home O2), asthma (no exacerbations in past 10 years), HTN, CAD s/p stent placed 20 years ago (on Plavix), CVA, chronic A. Fib on Eliquis, Type II diabetes mellitus, gout, sundowning syndrome, and anxiety  scented on 3/2 complaining of weakness, shortness of breath, and fatigue (onset 2/28). She is to have hemoglobin 4.4 on admission and is admitted for symptomatic anemia due to suspected acute GI bleeding. Review of Systems:     Review of Systems   Unable to perform ROS: Intubated         Medications: Allergies:     Allergies   Allergen Reactions    Latex Rash    Aleve [Naproxen Sodium]      Chronic kidney disease stage III, CHF    Pioglitazone Other (See Comments)     Congestive heart failure    Claritin [Loratadine]     Keflex [Cephalexin]     Lisinopril      Needs clarification of contraindication Episode Meds:   Scheduled Meds:    metoprolol tartrate  50 mg Oral BID    bumetanide  2 mg IntraVENous BID    polyvinyl alcohol  1 drop Both Eyes Q4H    And    artificial tears   Both Eyes Q4H    chlorhexidine  15 mL Mouth/Throat BID    vancomycin (VANCOCIN) intermittent dosing (placeholder)   Other RX Placeholder    meropenem  1,000 mg IntraVENous Q12H    ipratropium-albuterol  1 ampule Inhalation Q4H    sodium chloride flush  5-40 mL IntraVENous 2 times per day    allopurinol  300 mg Oral Daily    atorvastatin  40 mg Oral Daily    chlorhexidine   Topical Daily    [Held by provider] clopidogrel  75 mg Oral Daily    magnesium oxide  400 mg Oral BID    miconazole   Topical BID    therapeutic multivitamin-minerals  1 tablet Oral Daily    pantoprazole  40 mg IntraVENous Daily    And    sodium chloride flush  10 mL IntraVENous Daily    cetirizine HCl  5 mg Oral Daily     Continuous Infusions:    propofol 25 mcg/kg/min (03/08/22 0408)    sodium chloride      sodium chloride       PRN Meds: fentanNYL, sodium chloride flush, sodium chloride, ondansetron **OR** ondansetron, magnesium hydroxide, acetaminophen **OR** acetaminophen, sodium chloride    Data:     Past Medical History:   has a past medical history of Acute CVA (cerebrovascular accident) ([address] Altered mental status, Arthritis, Brain mass, Cellulitis and abscess, Cellulitis and abscess of unspecified site, Cellulitis of both lower extremities, Cellulitis of left lower extremity, Cellulitis of lower extremity, CHF (congestive heart failure) ([address] Chronic kidney disease, unspecified, Coronary atherosclerosis of native coronary artery, Essential hypertension, Essential hypertension, malignant, Hallucinations, Hard of hearing, Head contusion, Hypokalemia, Iron deficiency anemia, unspecified, Meningioma ([address] Myocardial infarction ([address] Other and unspecified hyperlipidemia, Pure hypercholesterolemia, Toxic metabolic encephalopathy, Type II or unspecified type diabetes mellitus without mention of complication, not stated as uncontrolled, Unspecified asthma(493.90), Unspecified venous (peripheral) insufficiency, Unspecified vitamin D deficiency, and UTI (urinary tract infection). Social History:   reports that she quit smoking about 22 years ago. She has a 2.50 pack-year smoking history. She has never used smokeless tobacco. She reports previous alcohol use. She reports that she does not use drugs. Family History:   Family History   Problem Relation Age of Onset    Diabetes Mother     Heart Disease Mother     Heart Disease Father     Diabetes Sister     High Blood Pressure Sister     Diabetes Maternal Grandmother        Vitals:  BP (!) 96/28   Pulse 98   Temp 98.4 °F (36.9 °C) (Oral)   Resp 20   Ht 4' 11\" (1.499 m)   Wt 152 lb 8.9 oz (69.2 kg)   SpO2 100%   BMI 30.81 kg/m²   Temp (24hrs), Av.8 °F (36.6 °C), Min:96.1 °F (35.6 °C), Max:98.8 °F (37.1 °C)    Recent Labs     22  1211 22   POCGLU 91 117*     Vitals:    22 0600 22 0630 22 0700 22 0715   BP: 104/75 101/63 (!) 96/28    Pulse: 110 97 98    Resp: 20 20 20    Temp:    98.4 °F (36.9 °C)   TempSrc:    Oral   SpO2:  100% 100%    Weight:       Height:           I/O(24Hr): Intake/Output Summary (Last 24 hours) at 3/8/2022 0726  Last data filed at 3/8/2022 0640  Gross per 24 hour   Intake 363.12 ml   Output 850 ml   Net -486.88 ml       Labs:  Recent Results (from the past 24 hour(s))   Arterial Blood Gases    Collection Time: 22  9:39 AM   Result Value Ref Range    pH, Arterial 7.359     pCO2, Arterial 43.4 mmHg    pO2, Arterial 75.9 mmHg    HCO3, Arterial 24.4 mmol/L    Negative Base Excess, Art 1.1 0.0 - 2.0 mmol/L    O2 Sat, Arterial 95.3 %    Carboxyhemoglobin 1.1 %    Methemoglobin 0.3 %    Pt Temp 37.0     O2 Device/Flow/% BIPAP     Respiratory Rate 22     Rio Test PASS     Sample Site Right Radial Artery     Pt. Position SEMI-FOWLERS     Mode 18/6     FIO2 30     Text for Respiratory RESULTS TO DR Aram Yang    POC Glucose Fingerstick    Collection Time: 03/07/22 12:11 PM   Result Value Ref Range    POC Glucose 91 65 - 105 mg/dL   BLOOD GAS, ARTERIAL    Collection Time: 03/07/22  3:55 PM   Result Value Ref Range    pH, Arterial 7.379     pCO2, Arterial 42.5 mmHg    pO2, Arterial 77.0 mmHg    HCO3, Arterial 25.1 mmol/L    Negative Base Excess, Art 0.1 0.0 - 2.0 mmol/L    O2 Sat, Arterial 95.0 %    Carboxyhemoglobin 1.2 %    Methemoglobin 0.4 %    Pt Temp 37.0     O2 Device/Flow/% VENTILATOR     Respiratory Rate 20     Rio Test PASS     Sample Site Left Brachial Artery     Pt.  Position SEMI-FOWLERS     Mode PRVC     Set Rate 20     Total Rate 20          FIO2 40     Peep/Cpap 7     Text for Respiratory GIVEN TO GABY    POC Glucose Fingerstick    Collection Time: 03/07/22  8:12 PM   Result Value Ref Range    POC Glucose 117 (H) 65 - 105 mg/dL   Hemoglobin and Hematocrit    Collection Time: 03/07/22 10:16 PM   Result Value Ref Range    Hemoglobin 7.8 (L) 12.0 - 16.0 g/dL    Hematocrit 23.9 (L) 36 - 46 %   Basic Metabolic Panel w/ Reflex to MG    Collection Time: 03/08/22  4:32 AM   Result Value Ref Range    Glucose 97 70 - 99 mg/dL    BUN 67 (H) 8 - 23 mg/dL    CREATININE 1.69 (H) 0.50 - 0.90 mg/dL    Calcium 7.8 (L) 8.6 - 10.4 mg/dL    Sodium 135 135 - 144 mmol/L    Potassium 3.9 3.7 - 5.3 mmol/L    Chloride 102 98 - 107 mmol/L    CO2 21 20 - 31 mmol/L    Anion Gap 12 9 - 17 mmol/L    GFR Non-African American 30 (L) >60 mL/min    GFR  36 (L) >60 mL/min    GFR Comment         CBC    Collection Time: 03/08/22  4:32 AM   Result Value Ref Range    WBC 7.6 3.5 - 11.0 k/uL    RBC 2.69 (L) 4.0 - 5.2 m/uL    Hemoglobin 8.2 (L) 12.0 - 16.0 g/dL    Hematocrit 24.9 (L) 36 - 46 %    MCV 92.7 80 - 100 fL    MCH 30.7 26 - 34 pg    MCHC 33.1 31 - 37 g/dL    RDW 16.3 (H) 11.5 - 14.9 %    Platelets 639 872 - 328 k/uL MPV 8.7 6.0 - 12.0 fL   Blood gas, arterial    Collection Time: 03/08/22  5:00 AM   Result Value Ref Range    pH, Arterial 7.450 7.350 - 7.450    pCO2, Arterial 35.4 35.0 - 45.0 mmHg    pO2, Arterial 87.7 80.0 - 100.0 mmHg    HCO3, Arterial 24.6 22.0 - 26.0 mmol/L    Positive Base Excess, Art 0.6 0.0 - 2.0 mmol/L    O2 Sat, Arterial 96.6 95 - 98 %    Carboxyhemoglobin 0.9 0 - 5 %    Methemoglobin 0.5 0.0 - 1.9 %    Pt Temp 37.1     O2 Device/Flow/% VENTILATOR     Rio Test PASS     Sample Site Right Radial Artery     Pt. Position FOWLERS     Mode PRVC     Set Rate 20          FIO2 40     Peep/Cpap 7     Text for Respiratory RESULT TO RN. Lab Results   Component Value Date/Time    SPECIAL NOT REPORTED 06/11/2021 04:31 PM     Lab Results   Component Value Date/Time    CULTURE NO SIGNIFICANT GROWTH 02/22/2022 12:31 PM         Radiology:    XR CHEST (SINGLE VIEW FRONTAL)    Result Date: 3/7/2022  Increasing congestive changes and small to moderate bilateral pleural effusions with adjacent airspace disease. XR CHEST (2 VW)    Result Date: 3/3/2022  1. Congestive heart failure is most likely given the radiographic findings; pneumonia is also a consideration in areas of consolidation with pleural effusion. 2.  Chronic appearing coarse interstitial densities predominate perihilar regions and lung bases, typical of sequela from smoking or other previous infectious/inflammatory process. 3. Calcific atherosclerosis aorta. 4. Cardiomegaly. CT HEAD WO CONTRAST    Result Date: 3/7/2022  Cerebral atrophy without acute intracranial abnormality. Stable meningioma adjacent to the right temporal lobe anteriorly. RECOMMENDATIONS: Unavailable     CT HEAD WO CONTRAST    Result Date: 3/4/2022  Cerebral atrophy without acute intracranial abnormality.      US LIVER    Result Date: 3/2/2022  Diffuse increased heterogeneous liver parenchymal echogenicity could be seen in patients with hepatic steatosis or diffuse hepatocellular process. Patent portal vein with biphasic flow can be seen in patient with tricuspid regurgitation. RECOMMENDATIONS: Unavailable     XR CHEST PORTABLE    Result Date: 3/7/2022  Endotracheal tube tip is in the right main bronchus and should be retracted approximately 4 cm. OG tube tip is in the stomach. Findings were discussed with Chelo CARBALLO RN (will convey the results to Dr. King Jin) at 2:04 pm on 3/7/2022. XR CHEST PORTABLE    Result Date: 3/5/2022  No significant change. XR CHEST PORTABLE    Result Date: 3/4/2022  Findings compatible with congestive heart failure with increased bilateral pleural effusion since the prior study     XR CHEST PORTABLE    Result Date: 3/1/2022  1. Cardiomegaly, mild vascular congestion, improved since the prior study 2. Small bilateral pleural effusions     XR CHEST PORTABLE    Result Date: 2/6/2022  Findings suggesting likely CHF as described. Physical Examination:        Physical Exam  Constitutional:       General: She is not in acute distress. Comments: Patient found lying in bed, intubated and sedated. HENT:      Head: Normocephalic and atraumatic. Right Ear: External ear normal.      Left Ear: External ear normal.      Nose: Nose normal.      Mouth/Throat:      Comments: ET tube in place  Eyes:      Conjunctiva/sclera: Conjunctivae normal.      Pupils: Pupils are equal, round, and reactive to light. Cardiovascular:      Rate and Rhythm: Tachycardia present. Rhythm irregular. Pulmonary:      Effort: No respiratory distress. Breath sounds: No wheezing or rales. Comments: Intubated on vent  Abdominal:      General: There is no distension. Palpations: Abdomen is soft. Tenderness: There is no guarding. Musculoskeletal:         General: Swelling present. Right lower leg: Edema present. Left lower leg: Edema present.       Comments: Pitting edema in all 4 limbs   Neurological:      Comments: Unresponsive, twice daily  Metoprolol tartrate 50 mg p.o. twice daily    Acute on chronic Afib with RVR  Daily Mag and K checks; supplement to keep Mag >2 and K >4  Sinus pause on EKG => will hold BB for now, await further recommendations from Cardiology    Code: Full  DVT prophylaxis: Hold due to anemia and suspected GI bleed  GI prophylaxis: Pantoprazole 40 mg IV daily  Diet: N.p.o. Activity: Up as tolerated  Dispo: Patient critically ill in ICU      Please note: Use of a speech recognition software was used in the creation of portions of this note and dictation errors, including those of syntax and sound alike word substitutions, may have escaped proofreading. Bella Connors,   3/8/2022  7:26 AM     Attending Physician Statement    I have discussed the case of Katie Rodríguez, including pertinent history and exam findings with the resident. I have seen and examined the patient and the key elements of the encounter have been performed by me. I agree with the assessment, plan, and orders as documented by the resident. Patient was transferred to ICU with acute hypercapnic respiratory failure secondary to possible aspiration pneumonia, he also has JOY superimposed on CKD. Her clinical condition is stable at present she is being weaned off mechanical ventilation. She does have atrial fibrillation with pauses her beta-blockers are withheld till seen by cardiology.   Time spent more than 30 minutes  Electronically signed by Sidney Schilder, MD on 3/8/2022 at 6:04 PM

## 2022-03-08 NOTE — PROGRESS NOTES
RN paged Dr. Andrea Dowling through answering service regarding patient's 3.24 second heart pause. Awaiting call back.

## 2022-03-08 NOTE — CARE COORDINATION
ONGOING DISCHARGE PLAN:    Patient was transferred to ICU 3/7 and intubated. Pt remains intubated this am. Attempting wean trial this afternoon. From home with son. Dtr checks on pt daily. Family declines SNF as patient does not have coverage. Family also declines VNS at this time , they do not want any body in the home. HELP program notified to help with  Medicaid. Pt went into Afib and then converted back on her own this am.     IV Bumex for fluid overload. IV Merrem and Vanco    Will continue to follow for additional discharge needs.     Electronically signed by Kacie Brambila RN on 3/8/2022 at 12:57 PM

## 2022-03-08 NOTE — PROGRESS NOTES
RN alerted physician that wean trial was lasting almost 3 hours for patient. Dr. Dean Abraham examined patient and asked if she was short of breath and patient nodded her head. Dr. Dean Abraham instructed nurse to place patient back on vent support settings and to keep patient minimally sedated. Will try wean trial again tomorrow.

## 2022-03-08 NOTE — PLAN OF CARE
Nutrition Problem #1: Mild malnutrition  Intervention: Food and/or Nutrient Delivery: Continue NPO,Start Tube Feeding  Nutritional Goals: Meet estimated nutrient needs

## 2022-03-08 NOTE — PROGRESS NOTES
GI Progress notes    3/8/2022   6:40 PM    Name:  Edgar Crocker  MRN:    [id number]     Acct:     [de-identified]   Room:  2008/2008-01  IP Day: 6     Admit Date: 3/1/2022  9:47 PM  PCP: Mark Anthony Reid MD    Subjective:     C/C:   Chief Complaint   Patient presents with    Fatigue       Interval History    Patient seen and examined. Currently intubated  Daughter at bedside. No overt GI bleeding  Hgb stable    ROS:  Unable to assess:  AMS    Medications: Allergies:    Allergies   Allergen Reactions    Latex Rash    Aleve [Naproxen Sodium]      Chronic kidney disease stage III, CHF    Pioglitazone Other (See Comments)     Congestive heart failure    Claritin [Loratadine]     Keflex [Cephalexin]     Lisinopril      Needs clarification of contraindication       Current Meds: bumetanide (BUMEX) injection 2 mg, TID  metoprolol tartrate (LOPRESSOR) tablet 25 mg, BID  fentaNYL (SUBLIMAZE) injection 50 mcg, Q30 Min PRN  propofol injection, Continuous  polyvinyl alcohol (LIQUIFILM TEARS) 1.4 % ophthalmic solution 1 drop, Q4H   And  lubrifresh P.M. (artificial tears) ophthalmic ointment, Q4H  chlorhexidine (PERIDEX) 0.12 % solution 15 mL, BID  meropenem (MERREM) 1,000 mg in sodium chloride 0.9 % 100 mL IVPB (mini-bag), Q12H  ipratropium-albuterol (DUONEB) nebulizer solution 1 ampule, Q4H  sodium chloride flush 0.9 % injection 5-40 mL, 2 times per day  sodium chloride flush 0.9 % injection 10 mL, PRN  0.9 % sodium chloride infusion, PRN  ondansetron (ZOFRAN-ODT) disintegrating tablet 4 mg, Q8H PRN   Or  ondansetron (ZOFRAN) injection 4 mg, Q6H PRN  magnesium hydroxide (MILK OF MAGNESIA) 400 MG/5ML suspension 30 mL, Daily PRN  acetaminophen (TYLENOL) tablet 650 mg, Q6H PRN   Or  acetaminophen (TYLENOL) suppository 650 mg, Q6H PRN  allopurinol (ZYLOPRIM) tablet 300 mg, Daily  atorvastatin (LIPITOR) tablet 40 mg, Daily  chlorhexidine (HIBICLENS) 4 % liquid, Daily  [Held by provider] clopidogrel (PLAVIX) tablet 75 mg, Daily  magnesium oxide (MAG-OX) tablet 400 mg, BID  miconazole (MICOTIN) 2 % powder, BID  therapeutic multivitamin-minerals 1 tablet, Daily  pantoprazole (PROTONIX) injection 40 mg, Daily   And  sodium chloride flush 0.9 % injection 10 mL, Daily  0.9 % sodium chloride infusion, PRN  cetirizine HCl (ZYRTEC) 5 MG/5ML solution 5 mg, Daily        Data:     Code Status:  Full Code    Family History   Problem Relation Age of Onset    Diabetes Mother     Heart Disease Mother     Heart Disease Father     Diabetes Sister     High Blood Pressure Sister     Diabetes Maternal Grandmother        Social History     Socioeconomic History    Marital status:      Spouse name: Not on file    Number of children: Not on file    Years of education: Not on file    Highest education level: Not on file   Occupational History    Not on file   Tobacco Use    Smoking status: Former Smoker     Packs/day: 0.25     Years: 10.00     Pack years: 2.50     Quit date: 2000     Years since quittin.1    Smokeless tobacco: Never Used   Vaping Use    Vaping Use: Never used   Substance and Sexual Activity    Alcohol use: Not Currently     Alcohol/week: 0.0 standard drinks    Drug use: No    Sexual activity: Not on file   Other Topics Concern    Not on file   Social History Narrative    Not on file     Social Determinants of Health     Financial Resource Strain: Low Risk     Difficulty of Paying Living Expenses: Not very hard   Food Insecurity: No Food Insecurity    Worried About Running Out of Food in the Last Year: Never true    Holden of Food in the Last Year: Never true   Transportation Needs:     Lack of Transportation (Medical): Not on file    Lack of Transportation (Non-Medical):  Not on file   Physical Activity:     Days of Exercise per Week: Not on file    Minutes of Exercise per Session: Not on file   Stress:     Feeling of Stress : Not on file   Social Connections:     Frequency of Communication with Friends and Family: Not on file    Frequency of Social Gatherings with Friends and Family: Not on file    Attends Bahai Services: Not on file    Active Member of Clubs or Organizations: Not on file    Attends Club or Organization Meetings: Not on file    Marital Status: Not on file   Intimate Partner Violence:     Fear of Current or Ex-Partner: Not on file    Emotionally Abused: Not on file    Physically Abused: Not on file    Sexually Abused: Not on file   Housing Stability:     Unable to Pay for Housing in the Last Year: Not on file    Number of [place] in the Last Year: Not on file    Unstable Housing in the Last Year: Not on file       Vitals:  BP (!) 162/52   Pulse 111   Temp 97.9 °F (36.6 °C) (Oral)   Resp 20   Ht 4' 11\" (1.499 m)   Wt 152 lb 8.9 oz (69.2 kg)   SpO2 99%   BMI 30.81 kg/m²   Temp (24hrs), Av.1 °F (36.7 °C), Min:97.1 °F (36.2 °C), Max:98.8 °F (37.1 °C)    Recent Labs     22  1211 22  1614   POCGLU 91 117* 96       I/O (24Hr):     Intake/Output Summary (Last 24 hours) at 3/8/2022 1840  Last data filed at 3/8/2022 1830  Gross per 24 hour   Intake 1437.13 ml   Output 1725 ml   Net -287.87 ml       Labs:      CBC:   Lab Results   Component Value Date    WBC 7.6 2022    RBC 2.69 2022    HGB 8.4 2022    HCT 25.3 2022    MCV 92.7 2022    MCH 30.7 2022    MCHC 33.1 2022    RDW 16.3 2022     2022    MPV 8.7 2022     CBC with Differential:    Lab Results   Component Value Date    WBC 7.6 2022    RBC 2.69 2022    HGB 8.4 2022    HCT 25.3 2022     2022    MCV 92.7 2022    MCH 30.7 2022    MCHC 33.1 2022    RDW 16.3 2022    NRBC 4 2022    LYMPHOPCT 2 2022    MONOPCT 9 2022    BASOPCT 0 2022    MONOSABS 0.93 2022    LYMPHSABS 0.21 2022    EOSABS 0.10 2022    BASOSABS 0.00 03/06/2022    DIFFTYPE NOT REPORTED 02/06/2022     Hemoglobin/Hematocrit:    Lab Results   Component Value Date    HGB 8.4 03/08/2022    HCT 25.3 03/08/2022     CMP:    Lab Results   Component Value Date     03/08/2022    K 3.9 03/08/2022     03/08/2022    CO2 21 03/08/2022    BUN 67 03/08/2022    CREATININE 1.69 03/08/2022    GFRAA 36 03/08/2022    LABGLOM 30 03/08/2022    GLUCOSE 97 03/08/2022    PROT 4.3 03/05/2022    LABALBU 2.3 03/05/2022    CALCIUM 7.8 03/08/2022    BILITOT 0.29 03/05/2022    ALKPHOS 184 03/05/2022    AST 40 03/05/2022    ALT 25 03/05/2022     BMP:    Lab Results   Component Value Date     03/08/2022    K 3.9 03/08/2022     03/08/2022    CO2 21 03/08/2022    BUN 67 03/08/2022    LABALBU 2.3 03/05/2022    CREATININE 1.69 03/08/2022    CALCIUM 7.8 03/08/2022    GFRAA 36 03/08/2022    LABGLOM 30 03/08/2022    GLUCOSE 97 03/08/2022     PT/INR:    Lab Results   Component Value Date    PROTIME 16.0 02/06/2022    INR 1.3 02/06/2022     PTT:    Lab Results   Component Value Date    APTT 34.9 02/06/2022   [APTT}    Physical Examination:        General appearance: intubated, illa-appearing  Mental Status: sedated  Abdomen: soft, nontender, nondistended, bowel sounds present all four quadrants, no masses, hepatomegaly or splenomegaly  Extremities: no edema, redness or tenderness in the calves  Skin: no gross lesions, rashes, or induration    Assessment:        Primary Problem  Acute GI bleeding     Active Hospital Problems    Diagnosis Date Noted    Mild malnutrition ([address] [E44.1] 03/08/2022    Symptomatic anemia [D64.9]     Family history of colon cancer [Z80.0]     Family history of pancreatic cancer [Z80.0]     Elevated LFTs [R79.89] 02/25/2021    Paroxysmal atrial fibrillation ([facility] [I48.0] 07/28/2020    Stage 3 chronic kidney disease ([facility] [N18.30]      Past Medical History:   Diagnosis Date    Acute CVA (cerebrovascular accident) ([facility] 7/25/2020    Altered mental status 5/26/2021    Arthritis     Brain mass     Cellulitis and abscess     Cellulitis and abscess of unspecified site     Cellulitis of both lower extremities 5/26/2021    Cellulitis of left lower extremity 7/26/2020    Cellulitis of lower extremity 10/4/2020    CHF (congestive heart failure) (HCC)     Chronic kidney disease, unspecified     Coronary atherosclerosis of native coronary artery     Essential hypertension 7/25/2020    Essential hypertension, malignant     Hallucinations 2/18/2021    Hard of hearing     Head contusion 9/9/2020    Hypokalemia 9/9/2020    Iron deficiency anemia, unspecified     Meningioma ([facility] 2/25/2021    Myocardial infarction ([facility]     Other and unspecified hyperlipidemia     Pure hypercholesterolemia     Toxic metabolic encephalopathy 2/27/7327    Type II or unspecified type diabetes mellitus without mention of complication, not stated as uncontrolled     Unspecified asthma(493.90)     Unspecified venous (peripheral) insufficiency     Unspecified vitamin D deficiency     UTI (urinary tract infection) 2/18/2021        Plan:        1. BRIGIDO  1. No overt bleeding  2. Hgb 8.2  3. Continue PPI  4. Worsening respiratory status, neuro status  5. Will plan GI investigations once status improves  6.  GI signing off, consult once stable for GI workup or if signs of bleeding      Explained to the patient and d/W Nursing Staff  Will F/U with you  Please call or Page for any issues or change in status  Thanks    Electronically signed by MAL Tam NP on 3/8/2022 at 6:40 PM

## 2022-03-09 ENCOUNTER — APPOINTMENT (OUTPATIENT)
Dept: GENERAL RADIOLOGY | Age: 72
DRG: 377 | End: 2022-03-09
Payer: OTHER GOVERNMENT

## 2022-03-09 LAB
ABSOLUTE EOS #: 0.1 K/UL (ref 0–0.4)
ABSOLUTE LYMPH #: 0.7 K/UL (ref 1–4.8)
ABSOLUTE MONO #: 1.3 K/UL (ref 0.1–1.3)
ALLEN TEST: ABNORMAL
ANION GAP SERPL CALCULATED.3IONS-SCNC: 8 MMOL/L (ref 9–17)
BASOPHILS # BLD: 1 % (ref 0–2)
BASOPHILS ABSOLUTE: 0.1 K/UL (ref 0–0.2)
BUN BLDV-MCNC: 63 MG/DL (ref 8–23)
CALCIUM SERPL-MCNC: 7.9 MG/DL (ref 8.6–10.4)
CARBOXYHEMOGLOBIN: 1 % (ref 0–5)
CHLORIDE BLD-SCNC: 103 MMOL/L (ref 98–107)
CO2: 25 MMOL/L (ref 20–31)
CREAT SERPL-MCNC: 1.76 MG/DL (ref 0.5–0.9)
EOSINOPHILS RELATIVE PERCENT: 1 % (ref 0–4)
FIO2: 40
GFR AFRICAN AMERICAN: 34 ML/MIN
GFR NON-AFRICAN AMERICAN: 28 ML/MIN
GFR SERPL CREATININE-BSD FRML MDRD: ABNORMAL ML/MIN/{1.73_M2}
GLUCOSE BLD-MCNC: 171 MG/DL (ref 70–99)
HCO3 ARTERIAL: 28.3 MMOL/L (ref 22–26)
HCT VFR BLD CALC: 23.4 % (ref 36–46)
HCT VFR BLD CALC: 24.8 % (ref 36–46)
HEMOGLOBIN: 7.7 G/DL (ref 12–16)
HEMOGLOBIN: 7.9 G/DL (ref 12–16)
LACTIC ACID, SEPSIS: 0.9 MMOL/L (ref 0.5–1.9)
LYMPHOCYTES # BLD: 8 % (ref 24–44)
MAGNESIUM: 2.1 MG/DL (ref 1.6–2.6)
MCH RBC QN AUTO: 30 PG (ref 26–34)
MCH RBC QN AUTO: 30.5 PG (ref 26–34)
MCHC RBC AUTO-ENTMCNC: 31.8 G/DL (ref 31–37)
MCHC RBC AUTO-ENTMCNC: 33 G/DL (ref 31–37)
MCV RBC AUTO: 92.6 FL (ref 80–100)
MCV RBC AUTO: 94.4 FL (ref 80–100)
METHEMOGLOBIN: 0.5 % (ref 0–1.9)
MODE: ABNORMAL
MONOCYTES # BLD: 16 % (ref 1–7)
O2 DEVICE/FLOW/%: ABNORMAL
O2 SAT, ARTERIAL: 97.4 % (ref 95–98)
PATIENT TEMP: 37
PCO2 ARTERIAL: 39.9 MMHG (ref 35–45)
PDW BLD-RTO: 17.2 % (ref 11.5–14.9)
PDW BLD-RTO: 17.8 % (ref 11.5–14.9)
PEEP/CPAP: 7
PH ARTERIAL: 7.46 (ref 7.35–7.45)
PLATELET # BLD: 233 K/UL (ref 150–450)
PLATELET # BLD: 251 K/UL (ref 150–450)
PMV BLD AUTO: 8.5 FL (ref 6–12)
PMV BLD AUTO: 8.5 FL (ref 6–12)
PO2 ARTERIAL: 107 MMHG (ref 80–100)
POSITIVE BASE EXCESS, ART: 4.4 MMOL/L (ref 0–2)
POTASSIUM SERPL-SCNC: 4.1 MMOL/L (ref 3.7–5.3)
PT. POSITION: ABNORMAL
RBC # BLD: 2.52 M/UL (ref 4–5.2)
RBC # BLD: 2.63 M/UL (ref 4–5.2)
SAMPLE SITE: ABNORMAL
SEG NEUTROPHILS: 74 % (ref 36–66)
SEGMENTED NEUTROPHILS ABSOLUTE COUNT: 6.2 K/UL (ref 1.3–9.1)
SET RATE: 20
SODIUM BLD-SCNC: 136 MMOL/L (ref 135–144)
TEXT FOR RESPIRATORY: ABNORMAL
VT: 500
WBC # BLD: 7.4 K/UL (ref 3.5–11)
WBC # BLD: 8.3 K/UL (ref 3.5–11)

## 2022-03-09 PROCEDURE — 94640 AIRWAY INHALATION TREATMENT: CPT

## 2022-03-09 PROCEDURE — 83735 ASSAY OF MAGNESIUM: CPT

## 2022-03-09 PROCEDURE — 80048 BASIC METABOLIC PNL TOTAL CA: CPT

## 2022-03-09 PROCEDURE — 94761 N-INVAS EAR/PLS OXIMETRY MLT: CPT

## 2022-03-09 PROCEDURE — 6370000000 HC RX 637 (ALT 250 FOR IP): Performed by: INTERNAL MEDICINE

## 2022-03-09 PROCEDURE — 85025 COMPLETE CBC W/AUTO DIFF WBC: CPT

## 2022-03-09 PROCEDURE — 36600 WITHDRAWAL OF ARTERIAL BLOOD: CPT

## 2022-03-09 PROCEDURE — 6360000002 HC RX W HCPCS: Performed by: INTERNAL MEDICINE

## 2022-03-09 PROCEDURE — 2580000003 HC RX 258: Performed by: INTERNAL MEDICINE

## 2022-03-09 PROCEDURE — 87040 BLOOD CULTURE FOR BACTERIA: CPT

## 2022-03-09 PROCEDURE — 36415 COLL VENOUS BLD VENIPUNCTURE: CPT

## 2022-03-09 PROCEDURE — 82805 BLOOD GASES W/O2 SATURATION: CPT

## 2022-03-09 PROCEDURE — 87070 CULTURE OTHR SPECIMN AEROBIC: CPT

## 2022-03-09 PROCEDURE — 2700000000 HC OXYGEN THERAPY PER DAY

## 2022-03-09 PROCEDURE — 99233 SBSQ HOSP IP/OBS HIGH 50: CPT | Performed by: INTERNAL MEDICINE

## 2022-03-09 PROCEDURE — 85027 COMPLETE CBC AUTOMATED: CPT

## 2022-03-09 PROCEDURE — 83605 ASSAY OF LACTIC ACID: CPT

## 2022-03-09 PROCEDURE — 71045 X-RAY EXAM CHEST 1 VIEW: CPT

## 2022-03-09 PROCEDURE — 6370000000 HC RX 637 (ALT 250 FOR IP)

## 2022-03-09 PROCEDURE — 2500000003 HC RX 250 WO HCPCS: Performed by: INTERNAL MEDICINE

## 2022-03-09 PROCEDURE — 87205 SMEAR GRAM STAIN: CPT

## 2022-03-09 PROCEDURE — C9113 INJ PANTOPRAZOLE SODIUM, VIA: HCPCS | Performed by: INTERNAL MEDICINE

## 2022-03-09 PROCEDURE — 94003 VENT MGMT INPAT SUBQ DAY: CPT

## 2022-03-09 PROCEDURE — 2000000000 HC ICU R&B

## 2022-03-09 RX ADMIN — ANTI-FUNGAL POWDER MICONAZOLE NITRATE TALC FREE: 1.42 POWDER TOPICAL at 21:07

## 2022-03-09 RX ADMIN — MINERAL OIL, PETROLATUM: 425; 568 OINTMENT OPHTHALMIC at 10:46

## 2022-03-09 RX ADMIN — SODIUM CHLORIDE, PRESERVATIVE FREE 10 ML: 5 INJECTION INTRAVENOUS at 09:05

## 2022-03-09 RX ADMIN — SODIUM CHLORIDE, PRESERVATIVE FREE 10 ML: 5 INJECTION INTRAVENOUS at 21:02

## 2022-03-09 RX ADMIN — BUMETANIDE 2 MG: 0.25 INJECTION, SOLUTION INTRAMUSCULAR; INTRAVENOUS at 13:59

## 2022-03-09 RX ADMIN — POLYVINYL ALCOHOL 1 DROP: 14 SOLUTION/ DROPS OPHTHALMIC at 06:00

## 2022-03-09 RX ADMIN — PROPOFOL 10 MCG/KG/MIN: 10 INJECTION, EMULSION INTRAVENOUS at 05:27

## 2022-03-09 RX ADMIN — BUMETANIDE 2 MG: 0.25 INJECTION, SOLUTION INTRAMUSCULAR; INTRAVENOUS at 21:08

## 2022-03-09 RX ADMIN — POLYVINYL ALCOHOL 1 DROP: 14 SOLUTION/ DROPS OPHTHALMIC at 02:09

## 2022-03-09 RX ADMIN — IPRATROPIUM BROMIDE AND ALBUTEROL SULFATE 1 AMPULE: 2.5; .5 SOLUTION RESPIRATORY (INHALATION) at 14:58

## 2022-03-09 RX ADMIN — ANTI-FUNGAL POWDER MICONAZOLE NITRATE TALC FREE: 1.42 POWDER TOPICAL at 07:41

## 2022-03-09 RX ADMIN — METOPROLOL TARTRATE 25 MG: 25 TABLET, FILM COATED ORAL at 20:53

## 2022-03-09 RX ADMIN — SODIUM CHLORIDE, PRESERVATIVE FREE 10 ML: 5 INJECTION INTRAVENOUS at 09:02

## 2022-03-09 RX ADMIN — FENTANYL CITRATE 50 MCG: 50 INJECTION INTRAMUSCULAR; INTRAVENOUS at 09:45

## 2022-03-09 RX ADMIN — BUMETANIDE 2 MG: 0.25 INJECTION, SOLUTION INTRAMUSCULAR; INTRAVENOUS at 08:57

## 2022-03-09 RX ADMIN — CETIRIZINE HYDROCHLORIDE 5 MG: 5 SOLUTION ORAL at 09:03

## 2022-03-09 RX ADMIN — MEROPENEM 1000 MG: 1 INJECTION, POWDER, FOR SOLUTION INTRAVENOUS at 21:06

## 2022-03-09 RX ADMIN — CHLORHEXIDINE GLUCONATE 15 ML: 1.2 RINSE ORAL at 07:43

## 2022-03-09 RX ADMIN — MEROPENEM 1000 MG: 1 INJECTION, POWDER, FOR SOLUTION INTRAVENOUS at 09:15

## 2022-03-09 RX ADMIN — MINERAL OIL, PETROLATUM: 425; 568 OINTMENT OPHTHALMIC at 02:09

## 2022-03-09 RX ADMIN — ATORVASTATIN CALCIUM 40 MG: 40 TABLET, FILM COATED ORAL at 09:01

## 2022-03-09 RX ADMIN — IPRATROPIUM BROMIDE AND ALBUTEROL SULFATE 1 AMPULE: 2.5; .5 SOLUTION RESPIRATORY (INHALATION) at 23:38

## 2022-03-09 RX ADMIN — IPRATROPIUM BROMIDE AND ALBUTEROL SULFATE 1 AMPULE: 2.5; .5 SOLUTION RESPIRATORY (INHALATION) at 03:50

## 2022-03-09 RX ADMIN — IPRATROPIUM BROMIDE AND ALBUTEROL SULFATE 1 AMPULE: 2.5; .5 SOLUTION RESPIRATORY (INHALATION) at 20:17

## 2022-03-09 RX ADMIN — PANTOPRAZOLE SODIUM 40 MG: 40 INJECTION, POWDER, FOR SOLUTION INTRAVENOUS at 09:02

## 2022-03-09 RX ADMIN — POLYVINYL ALCOHOL 1 DROP: 14 SOLUTION/ DROPS OPHTHALMIC at 09:30

## 2022-03-09 RX ADMIN — METOPROLOL TARTRATE 25 MG: 25 TABLET, FILM COATED ORAL at 09:01

## 2022-03-09 RX ADMIN — MULTIPLE VITAMINS W/ MINERALS TAB 1 TABLET: TAB at 09:02

## 2022-03-09 RX ADMIN — MINERAL OIL, PETROLATUM: 425; 568 OINTMENT OPHTHALMIC at 06:00

## 2022-03-09 RX ADMIN — IPRATROPIUM BROMIDE AND ALBUTEROL SULFATE 1 AMPULE: 2.5; .5 SOLUTION RESPIRATORY (INHALATION) at 10:51

## 2022-03-09 RX ADMIN — MAGNESIUM OXIDE TAB 400 MG (241.3 MG ELEMENTAL MG) 400 MG: 400 (241.3 MG) TAB at 20:53

## 2022-03-09 RX ADMIN — MAGNESIUM OXIDE TAB 400 MG (241.3 MG ELEMENTAL MG) 400 MG: 400 (241.3 MG) TAB at 09:01

## 2022-03-09 RX ADMIN — ALLOPURINOL 300 MG: 300 TABLET ORAL at 09:01

## 2022-03-09 RX ADMIN — IPRATROPIUM BROMIDE AND ALBUTEROL SULFATE 1 AMPULE: 2.5; .5 SOLUTION RESPIRATORY (INHALATION) at 07:15

## 2022-03-09 ASSESSMENT — PULMONARY FUNCTION TESTS
PIF_VALUE: 24
PIF_VALUE: 23
PIF_VALUE: 18
PIF_VALUE: 18
PIF_VALUE: 23
PIF_VALUE: 19
PIF_VALUE: 24
PIF_VALUE: 24
PIF_VALUE: 17
PIF_VALUE: 16
PIF_VALUE: 17
PIF_VALUE: 17
PIF_VALUE: 23
PIF_VALUE: 22
PIF_VALUE: 17
PIF_VALUE: 23
PIF_VALUE: 22
PIF_VALUE: 17
PIF_VALUE: 22
PIF_VALUE: 17
PIF_VALUE: 17
PIF_VALUE: 23
PIF_VALUE: 17
PIF_VALUE: 22
PIF_VALUE: 24
PIF_VALUE: 25
PIF_VALUE: 18
PIF_VALUE: 24
PIF_VALUE: 17
PIF_VALUE: 25
PIF_VALUE: 22

## 2022-03-09 ASSESSMENT — PAIN SCALES - GENERAL
PAINLEVEL_OUTOF10: 0
PAINLEVEL_OUTOF10: 4
PAINLEVEL_OUTOF10: 0

## 2022-03-09 NOTE — PROGRESS NOTES
[address]    PROGRESS NOTE             3/9/2022    8:38 AM    Name:   Manon Mcardle  MRN:     [id number]     Acct:      [de-identified]   Room:   2008/2008-01  IP Day:  7  Admit Date:  3/1/2022  9:47 PM    PCP:  Nicolás Shaw MD  Code Status:  Full Code    Subjective: This is a delayed entry note and reflect's the patient's condition and management plan at time of service. C/C:   Chief Complaint   Patient presents with    Fatigue     Interval History Status: improved. She was seen and examined. Remains intubated. Is responsive and following commands,  Answering questions with head nods appropriately. Was on wean trial for about 3 hours yesterday,  Plan for repeat wean trial today. Brief History:     Isaias Friedman a 67-year-old female with past medical history of CKD Stage III, asthma, impaired hearing, COPD (has never been on home O2), asthma (no exacerbations in past 10 years), HTN, CAD s/p stent placed 20 years ago (on Plavix), CVA, chronic A. Fib on Eliquis, Type II diabetes mellitus, gout, sundowning syndrome, and anxiety  scented on 3/2 complaining of weakness, shortness of breath, and fatigue (onset 2/28). She was found to have hemoglobin 4.4 on admission and was admitted for symptomatic anemia due to suspected acute GI bleeding. Subsequently developed delirium with decreased resp status requiring intubation & transfer to ICU on 3/7. Review of Systems:     Review of Systems   Unable to perform ROS: Intubated         Medications: Allergies:     Allergies   Allergen Reactions    Latex Rash    Aleve [Naproxen Sodium]      Chronic kidney disease stage III, CHF    Pioglitazone Other (See Comments)     Congestive heart failure    Claritin [Loratadine]     Keflex [Cephalexin]     Lisinopril      Needs clarification of contraindication       Current Meds:   Scheduled Meds:    bumetanide  2 mg IntraVENous TID    metoprolol tartrate  25 mg Oral BID    polyvinyl alcohol  1 drop Both Eyes Q4H    And    artificial tears   Both Eyes Q4H    chlorhexidine  15 mL Mouth/Throat BID    meropenem  1,000 mg IntraVENous Q12H    ipratropium-albuterol  1 ampule Inhalation Q4H    sodium chloride flush  5-40 mL IntraVENous 2 times per day    allopurinol  300 mg Oral Daily    atorvastatin  40 mg Oral Daily    chlorhexidine   Topical Daily    [Held by provider] clopidogrel  75 mg Oral Daily    magnesium oxide  400 mg Oral BID    miconazole   Topical BID    therapeutic multivitamin-minerals  1 tablet Oral Daily    pantoprazole  40 mg IntraVENous Daily    And    sodium chloride flush  10 mL IntraVENous Daily    cetirizine HCl  5 mg Oral Daily     Continuous Infusions:    propofol 10 mcg/kg/min (03/09/22 0527)    sodium chloride      sodium chloride       PRN Meds: fentanNYL, sodium chloride flush, sodium chloride, ondansetron **OR** ondansetron, magnesium hydroxide, acetaminophen **OR** acetaminophen, sodium chloride    Data:     Past Medical History:   has a past medical history of Acute CVA (cerebrovascular accident) ([address] Altered mental status, Arthritis, Brain mass, Cellulitis and abscess, Cellulitis and abscess of unspecified site, Cellulitis of both lower extremities, Cellulitis of left lower extremity, Cellulitis of lower extremity, CHF (congestive heart failure) ([address] Chronic kidney disease, unspecified, Coronary atherosclerosis of native coronary artery, Essential hypertension, Essential hypertension, malignant, Hallucinations, Hard of hearing, Head contusion, Hypokalemia, Iron deficiency anemia, unspecified, Meningioma ([address] Myocardial infarction ([address] Other and unspecified hyperlipidemia, Pure hypercholesterolemia, Toxic metabolic encephalopathy, Type II or unspecified type diabetes mellitus without mention of complication, not stated as uncontrolled, Unspecified asthma(493.90), Unspecified venous (peripheral) insufficiency, Unspecified vitamin D deficiency, and UTI (urinary tract infection). Social History:   reports that she quit smoking about 22 years ago. She has a 2.50 pack-year smoking history. She has never used smokeless tobacco. She reports previous alcohol use. She reports that she does not use drugs. Family History:   Family History   Problem Relation Age of Onset    Diabetes Mother     Heart Disease Mother     Heart Disease Father     Diabetes Sister     High Blood Pressure Sister     Diabetes Maternal Grandmother        Vitals:  BP (!) 147/40   Pulse 103   Temp 99.2 °F (37.3 °C) (Oral)   Resp 21   Ht 4' 11\" (1.499 m)   Wt 146 lb 13.2 oz (66.6 kg)   SpO2 97%   BMI 29.66 kg/m²   Temp (24hrs), Av.3 °F (36.8 °C), Min:97.1 °F (36.2 °C), Max:99.2 °F (37.3 °C)    Recent Labs     22  1211 22  1614   POCGLU 91 117* 96     Vitals:    22 0630 22 0700 22 0725 22 0730   BP: (!) 158/44 (!) 147/42  (!) 147/40   Pulse: 112 113 104 103   Resp:  21   Temp:    99.2 °F (37.3 °C)   TempSrc:    Oral   SpO2: (!) 80% 98% 99% 97%   Weight:    146 lb 13.2 oz (66.6 kg)   Height:           I/O(24Hr):     Intake/Output Summary (Last 24 hours) at 3/9/2022 0838  Last data filed at 3/9/2022 0730  Gross per 24 hour   Intake 1253.1 ml   Output 1825 ml   Net -571.9 ml       Labs:  Recent Results (from the past 24 hour(s))   POC Glucose Fingerstick    Collection Time: 22  4:14 PM   Result Value Ref Range    POC Glucose 96 65 - 105 mg/dL   Hemoglobin and Hematocrit    Collection Time: 22  6:02 PM   Result Value Ref Range    Hemoglobin 8.4 (L) 12.0 - 16.0 g/dL    Hematocrit 25.3 (L) 36 - 46 %   Blood gas, arterial    Collection Time: 22  4:28 AM   Result Value Ref Range    pH, Arterial 7.459 (H) 7.350 - 7.450    pCO2, Arterial 39.9 35.0 - 45.0 mmHg    pO2, Arterial 107.0 (H) 80.0 - 100.0 mmHg    HCO3, Arterial 28.3 (H) 22.0 - 26.0 mmol/L    Positive Base Excess, Art 4.4 (H) 0.0 - 2.0 mmol/L    O2 Sat, Arterial 97.4 95 - 98 %    Carboxyhemoglobin 1.0 0 - 5 %    Methemoglobin 0.5 0.0 - 1.9 %    Pt Temp 37.0     O2 Device/Flow/% VENTILATOR     Rio Test PASS     Sample Site Right Radial Artery     Pt. Position FOWLERS     Mode PRVC     Set Rate 20          FIO2 40     Peep/Cpap 7     Text for Respiratory RESULT TO RN. Basic Metabolic Panel w/ Reflex to MG    Collection Time: 03/09/22  4:48 AM   Result Value Ref Range    Glucose 171 (H) 70 - 99 mg/dL    BUN 63 (H) 8 - 23 mg/dL    CREATININE 1.76 (H) 0.50 - 0.90 mg/dL    Calcium 7.9 (L) 8.6 - 10.4 mg/dL    Sodium 136 135 - 144 mmol/L    Potassium 4.1 3.7 - 5.3 mmol/L    Chloride 103 98 - 107 mmol/L    CO2 25 20 - 31 mmol/L    Anion Gap 8 (L) 9 - 17 mmol/L    GFR Non-African American 28 (L) >60 mL/min    GFR  34 (L) >60 mL/min    GFR Comment         CBC    Collection Time: 03/09/22  4:48 AM   Result Value Ref Range    WBC 7.4 3.5 - 11.0 k/uL    RBC 2.52 (L) 4.0 - 5.2 m/uL    Hemoglobin 7.7 (L) 12.0 - 16.0 g/dL    Hematocrit 23.4 (L) 36 - 46 %    MCV 92.6 80 - 100 fL    MCH 30.5 26 - 34 pg    MCHC 33.0 31 - 37 g/dL    RDW 17.2 (H) 11.5 - 14.9 %    Platelets 685 086 - 581 k/uL    MPV 8.5 6.0 - 12.0 fL   Magnesium    Collection Time: 03/09/22  4:48 AM   Result Value Ref Range    Magnesium 2.1 1.6 - 2.6 mg/dL         Lab Results   Component Value Date/Time    SPECIAL NOT REPORTED 06/11/2021 04:31 PM     Lab Results   Component Value Date/Time    CULTURE NO SIGNIFICANT GROWTH 02/22/2022 12:31 PM         Radiology:    XR CHEST (SINGLE VIEW FRONTAL)    Result Date: 3/7/2022  Increasing congestive changes and small to moderate bilateral pleural effusions with adjacent airspace disease. XR CHEST (2 VW)    Result Date: 3/3/2022  1.   Congestive heart failure is most likely given the radiographic findings; pneumonia is also a consideration in areas of consolidation with pleural effusion. 2.  Chronic appearing coarse interstitial densities predominate perihilar regions and lung bases, typical of sequela from smoking or other previous infectious/inflammatory process. 3. Calcific atherosclerosis aorta. 4. Cardiomegaly. CT HEAD WO CONTRAST    Result Date: 3/7/2022  Cerebral atrophy without acute intracranial abnormality. Stable meningioma adjacent to the right temporal lobe anteriorly. RECOMMENDATIONS: Unavailable     CT HEAD WO CONTRAST    Result Date: 3/4/2022  Cerebral atrophy without acute intracranial abnormality. US LIVER    Result Date: 3/2/2022  Diffuse increased heterogeneous liver parenchymal echogenicity could be seen in patients with hepatic steatosis or diffuse hepatocellular process. Patent portal vein with biphasic flow can be seen in patient with tricuspid regurgitation. RECOMMENDATIONS: Unavailable     XR CHEST PORTABLE    Result Date: 3/9/2022  Continued bibasilar edema and pleural effusions, slightly progressed on the right. Life support devices, unchanged. XR CHEST PORTABLE    Result Date: 3/8/2022  1. Improved positioning of the endotracheal tube. 2.  Unchanged small-to-moderate pleural effusions and bibasilar opacities. XR CHEST PORTABLE    Result Date: 3/8/2022  Endotracheal tube tip is in the right main bronchus and should be retracted approximately 4 cm. OG tube tip is in the stomach. Findings were discussed with Chelo CARBALLO RN (will convey the results to Dr. Tavia Brooks) at 2:04 pm on 3/7/2022. XR CHEST PORTABLE    Result Date: 3/5/2022  No significant change. XR CHEST PORTABLE    Result Date: 3/4/2022  Findings compatible with congestive heart failure with increased bilateral pleural effusion since the prior study     XR CHEST PORTABLE    Result Date: 3/1/2022  1. Cardiomegaly, mild vascular congestion, improved since the prior study 2.  Small bilateral pleural effusions        Physical Examination:        Physical Exam  Constitutional: Comments: Found lying in bed, Awake   HENT:      Head: Normocephalic. Right Ear: External ear normal.      Left Ear: External ear normal.      Nose: Nose normal.   Eyes:      General: No scleral icterus. Right eye: No discharge. Left eye: No discharge. Conjunctiva/sclera: Conjunctivae normal.   Cardiovascular:      Rate and Rhythm: Normal rate and regular rhythm. Pulmonary:      Effort: No respiratory distress. Breath sounds: No wheezing. Comments: Intubated, on vent  Abdominal:      General: There is no distension. Palpations: Abdomen is soft. Tenderness: There is no abdominal tenderness. There is no guarding. Neurological:      Mental Status: She is alert.       Comments: Interactive,  Attempts to speak,  Nods head appropriately,  Follows commands   Psychiatric:      Comments: Anxious but calms down with redirection           Assessment:        Primary Problem  Acute GI bleeding    Active Hospital Problems    Diagnosis Date Noted    Mild malnutrition ([facility] [E44.1] 03/08/2022    Symptomatic anemia [D64.9]     Family history of colon cancer [Z80.0]     Family history of pancreatic cancer [Z80.0]     Elevated LFTs [R79.89] 02/25/2021    Paroxysmal atrial fibrillation ([facility] [I48.0] 07/28/2020    Stage 3 chronic kidney disease ([facility] [N18.30]        Plan:        Symptomatic Anemia 2/2 suspected GI bleed - stable  Hemoglobin 4.4 admission  No overt bleeding found but patient on Eliquis for A. fib at home  1 unit PRBC transfused on 3/2  Hemoglobin stable around 7s & 8s  Iron sucrose (Venofer) 200 mg IV daily x5 doses completed on 3/6  Pantoprazole 40 mg IV daily  GI following; no scoping done d/t AMS, plan for investigations once status improves  Continue to hold Eliquis and Plavix     Acute hypercapnic respiratory failure with AMS - improved  Transferred to ICU & intubated on 3/7  Cetirizine 5 mg p.o. daily  Ipratropium-albuterol nebulizer 1 ampoule every 4 hours  Remains on vent but off sedation, is alert and responsive this a.m. Wean trial today  Pulm/Crit Care following; possible extubation today, possible future thoracentesis     Pneumonia, likely bacterial, possibly aspiration PNA  Negative for COVID-19 , influenza, MRSA  CXR: Continued bibasilar edema and pleural effusions, slightly progressed on the right  Respiratory cultures pending  Vancomycin discontinued since MRSA negative  Meropenem 1 g IV twice daily -day 3     JOY on CKD stage III  Likely prerenal azotemia  Creatinine stable and near baseline  Nephro following: Bumex IV 2 mg twice daily (increased yesterday)     Acute on chronic exacerbation of HFpEF and Multivessel CAD status post CABG in 2003  Echo on 2/6: EF > 55%, LVH  Bumetanide 2 mg IV twice daily  Metoprolol tartrate 25 mg p.o. twice daily (decreased from 50 mg yesterday d/t sinus pauses on EKG. Will increase back to 50 mg depending on clinical course/Cardio recommendations)  Cardiology following, recommend:   Continue BB and statin,    DC Plavix for now,    resume Eliquis on discharge when okay with GI   Diurese per Nephro rec's    Afib  Daily Mag and K checks; supplement to keep Mag >2 and K >4    Other: pt on chlorhexidine; appears to have been prescribed this as outpatient on 2/15 as outpatient for lower extremity cellulitis; no cellulitis at the moment so will discontinue chlorhexidine for now     Code: Full  DVT prophylaxis: Hold due to anemia and suspected GI bleed  GI prophylaxis: Pantoprazole 40 mg IV daily  Diet: N.p.o. Activity: Up as tolerated  Dispo: Patient critically ill in ICU      Please note: Use of a speech recognition software was used in the creation of portions of this note and dictation errors, including those of syntax and sound alike word substitutions, may have escaped proofreading.        Keely Rico DO  3/9/2022  8:38 AM   Attending Physician Statement    I have discussed the case of Krista Meghas, including pertinent history and exam findings with the resident. I have seen and examined the patient and the key elements of the encounter have been performed by me. I agree with the assessment, plan, and orders as documented by the resident. The patient was extubated earlier around 10.30 AM.  Postextubation patient by me that she was still lethargic. Her medications were reviewed and at present she is not on significant sedation. She is being treated for pneumonia JOY is a previous history of CKD stage III.   He has a previous history of CAD CABG and atrial fibrillation  Electronically signed by Vanda Israel MD on 3/9/2022 at 6:33 PM

## 2022-03-09 NOTE — PLAN OF CARE
Problem: OXYGENATION/RESPIRATORY FUNCTION  Goal: Patient will maintain patent airway  3/9/2022 1346 by Jennifer Brock RCP  Outcome: Ongoing     Problem: OXYGENATION/RESPIRATORY FUNCTION  Goal: Patient will achieve/maintain normal respiratory rate/effort  Description: Respiratory rate and effort will be within normal limits for the patient  3/9/2022 1346 by Jennifer Brock RCP  Outcome: Ongoing

## 2022-03-09 NOTE — FLOWSHEET NOTE
SC visit with patient and her daughter Melissa Hooper; patient welcomed prayer; daughter provided medical update and explained patient is very hard of hearing; listening presence and support; got medical update from Emelyn Crain [facility]     03/09/22 1440   Encounter Summary   Services provided to: Patient and family together   Referral/Consult From: [facility]   (3/9/22)   Complexity of Encounter Moderate   Length of Encounter 15 minutes   Spiritual Assessment Completed Yes   Spiritual/Jewish   Type Spiritual support   Assessment Approachable;Coping;Helplessness   Intervention Active listening;Explored feelings, thoughts, concerns;Prayer;Sustaining presence/ Ministry of presence; Discussed illness/injury and it's impact   Outcome Expressed gratitude;Engaged in conversation;Expressed feelings/needs/concerns;Coping; Hopeful;Receptive

## 2022-03-09 NOTE — PROGRESS NOTES
Order obtained for extubation. SpO2 of 99 on 30% FiO2. Patient extubated and placed on 3 liters/min via nasal cannula. Post extubation SpO2 is 99% with HR  91 bpm and RR 22 breaths/min. Patient had strong cough that was non-productive. Extubation Well tolerated by patient. .   Breath Sounds: diminished    Jorge L Hayes RCP   11:40 AM

## 2022-03-09 NOTE — CARE COORDINATION
ONGOING DISCHARGE PLAN:    Patient is intubated on vent. Nods head to questions per RN Martin Mckeon. Spoke with patient's dtr  regarding discharge plan and patient confirms that plan is still home with family. Per Dtr pt does not have  Coverage for SNF or VNS and they do not want anyone in the home. HELP program notified to help with medicaid. Eliquis PTA. IV Merrem awaiting sputum culture result. Will continue to follow for additional discharge needs.     Electronically signed by Gerry Hernandez RN on 3/9/2022 at 9:39 AM

## 2022-03-09 NOTE — PROGRESS NOTES
Dr. Caro Paniagua roundphoebe at bedside. Orders received to stop tube feeding and Sedation for pending extubation after wean trial. See physician progress note and orders.

## 2022-03-09 NOTE — FLOWSHEET NOTE
Update with SHERITA Whitney; prayer at bedside.       03/08/22 1923   Encounter Summary   Services provided to: Patient   Referral/Consult From: [facility]ing   Complexity of Encounter Low   Length of Encounter 15 minutes   Spiritual/Hindu   Type Spiritual support   Assessment Unable to respond   Intervention Prayer

## 2022-03-09 NOTE — PROGRESS NOTES
ICU Progress Note (Vent)   [facility]    Patient - Jean Jacobs,  Age - 66 y.o.    - 1950      Room Number -    N -  [id number]   [facility]  Date of Admission -  3/1/2022  9:47 PM    Events of Past 24 Hours   Alert, interactive; has been on a weaning trial for about an hour    Vitals    height is 4' 11\" (1.499 m) and weight is 146 lb 13.2 oz (66.6 kg). Her oral temperature is 99.2 °F (37.3 °C). Her blood pressure is 122/40 (abnormal) and her pulse is 80. Her respiration is 13 and oxygen saturation is 98%. Temperature Range: Temp: 99.2 °F (37.3 °C) Temp  Av.3 °F (36.8 °C)  Min: 97.1 °F (36.2 °C)  Max: 99.2 °F (37.3 °C)  BP Range:  Systolic (87FLS), ANT:255 , Min:75 , HTE:998     Diastolic (61BIX), MHY:33, Min:25, Max:138    Pulse Range: Pulse  Av.7  Min: 80  Max: 119  Respiration Range: Resp  Av.3  Min: 12  Max: 36  Current Pulse Ox[de-identified]  SpO2: 98 %  24HR Pulse Ox Range:  SpO2  Av.6 %  Min: 80 %  Max: 100 %  Oxygen Amount and Delivery: O2 Flow Rate (L/min): 3 L/min      Wt Readings from Last 3 Encounters:   22 146 lb 13.2 oz (66.6 kg)   22 156 lb 1.4 oz (70.8 kg)   22 143 lb 6.4 oz (65 kg)     I/O       Intake/Output Summary (Last 24 hours) at 3/9/2022 1059  Last data filed at 3/9/2022 0730  Gross per 24 hour   Intake 1253.1 ml   Output 1825 ml   Net -571.9 ml     I/O last 3 completed shifts:   In: 1616.2 [I.V.:248.3; NG/GT:270; IV Piggyback:1098]  Out: 9948 [Urine:2675]     DRAIN/TUBE OUTPUT:     Invasive Lines   ETT Day -   3  Lines -  0    ICP PRESSURE RANGE:  No data recorded  CVP PRESSURE RANGE:  No data recorded  Mechanical Ventilation Data   SETTINGS (Comprehensive)  Vent Information  $Ventilation: $Subsequent Day  Skin Assessment: Clean, dry, & intact  Suction Catheter Diameter: 14  Equipment Changed: HME  Vent Type: Servo i  Vent Mode: CPAP  Vt Ordered: 500 mL  Rate Set: 20 bmp  Pressure Support: 10 cmH20  FiO2 : 30 %  SpO2: 98 %  SpO2/FiO2 ratio: 326.67  Sensitivity: 5  PEEP/CPAP: 7  I Time/ I Time %: 0.9 s  Humidification Source: HME  Mask Type: Total face  Mask Size: Large  Additional Respiratory  Assessments  Pulse: 80  Resp: 13  SpO2: 98 %  End Tidal CO2: 42 (%)  Position: Semi-Leung's  Humidification Source: HME  Oral Care Completed?: Yes  Oral Care: Mouthwash with chlorhexidine,Mouth swabbed,Suction toothette,Mouth suctioned  Cuff Pressure (cm H2O): 26 cm H2O       ABGs:   Lab Results   Component Value Date    PHART 7.459 03/09/2022    PO2ART 107.0 03/09/2022    VBH6KDM 39.9 03/09/2022       Lab Results   Component Value Date    MODE PRV 03/09/2022         Medications   IV   propofol 10 mcg/kg/min (03/09/22 0527)    sodium chloride      sodium chloride        bumetanide  2 mg IntraVENous TID    metoprolol tartrate  25 mg Oral BID    polyvinyl alcohol  1 drop Both Eyes Q4H    And    artificial tears   Both Eyes Q4H    chlorhexidine  15 mL Mouth/Throat BID    meropenem  1,000 mg IntraVENous Q12H    ipratropium-albuterol  1 ampule Inhalation Q4H    sodium chloride flush  5-40 mL IntraVENous 2 times per day    allopurinol  300 mg Oral Daily    atorvastatin  40 mg Oral Daily    chlorhexidine   Topical Daily    magnesium oxide  400 mg Oral BID    miconazole   Topical BID    therapeutic multivitamin-minerals  1 tablet Oral Daily    pantoprazole  40 mg IntraVENous Daily    And    sodium chloride flush  10 mL IntraVENous Daily    cetirizine HCl  5 mg Oral Daily       Diet/Nutrition   Diet NPO Exceptions are: Sips of Water with Meds  ADULT TUBE FEEDING; Orogastric; Other Tube Feeding (specify); Osmolite 1.5; Continuous; 15; Yes; 10; Q 4 hours; 40; 30; Other (specify); no water flushes    Exam   VITALS    height is 4' 11\" (1.499 m) and weight is 146 lb 13.2 oz (66.6 kg). Her oral temperature is 99.2 °F (37.3 °C).  Her blood pressure is 122/40 (abnormal) and her pulse is 80. Her respiration is 13 and oxygen saturation is 98%. Ventilator Settings (Basic)  Vent Mode: CPAP Rate Set: 20 bmp/Vt Ordered: 500 mL/ /FiO2 : 30 %    Constitutional - Sedated on minimal doses of propofol  General Appearance  well developed, well nourished  HEENT - Life support devices in place (ET, OG),normocephalic, atraumatic. PERRLA  Lungs - Chest expands equally, no wheezes, Minich breath sound. Cardiovascular - Heart sounds are normal.  normal rate and rhythm regular, no murmur, gallop or rub. Abdomen - soft, nontender, nondistended, no masses or organomegaly  Neurologic - CN II-XII are grossly intact. There are no focal motor deficits  Skin - no bruising or bleeding  Extremities - no cyanosis, clubbing; + edema    Lab Results   CBC     Lab Results   Component Value Date    WBC 7.4 03/09/2022    RBC 2.52 03/09/2022    HGB 7.7 03/09/2022    HCT 23.4 03/09/2022     03/09/2022    MCV 92.6 03/09/2022    MCH 30.5 03/09/2022    MCHC 33.0 03/09/2022    RDW 17.2 03/09/2022    NRBC 4 03/06/2022    LYMPHOPCT 2 03/06/2022    MONOPCT 9 03/06/2022    BASOPCT 0 03/06/2022    MONOSABS 0.93 03/06/2022    LYMPHSABS 0.21 03/06/2022    EOSABS 0.10 03/06/2022    BASOSABS 0.00 03/06/2022    DIFFTYPE NOT REPORTED 02/06/2022       BMP   Lab Results   Component Value Date     03/09/2022    K 4.1 03/09/2022     03/09/2022    CO2 25 03/09/2022    BUN 63 03/09/2022    CREATININE 1.76 03/09/2022    GLUCOSE 171 03/09/2022    CALCIUM 7.9 03/09/2022       LFTS  Lab Results   Component Value Date    ALKPHOS 184 03/05/2022    ALT 25 03/05/2022    AST 40 03/05/2022    PROT 4.3 03/05/2022    BILITOT 0.29 03/05/2022    BILIDIR 0.13 03/05/2022    IBILI 0.16 03/05/2022    LABALBU 2.3 03/05/2022       INR  No results for input(s): PROTIME, INR in the last 72 hours. APTT  No results for input(s): APTT in the last 72 hours.     Lactic Acid  Lab Results   Component Value Date    LACTA 0.7 03/04/2022    LACTA 1.2 02/18/2021    LACTA 1.8 07/25/2020        BNP   No results for input(s): BNP in the last 72 hours. Cultures       Radiology     Plain Films         Chest x-ray shows large right pleural effusion and smaller left        SYSTEM ASSESSMENT      Acute respiratory failure, hypoxic and hypercapnic, intubated 3/7  Acute on chronic diastolic heart failure with worsening bilateral effusions  COPD, clinical diagnosis no PFTs, no pulmonology  History of tobacco use 30+ pack year  Nonmorbid obesity  AZIZA clinical diagnosis  Chronic kidney disease  Anemia  Full CODE STATUS      Neuro   Stop all sedation    Respiratory   Wean oxygen as tolerated.  Keep O2 sat > 88%  Hopefully will be able to extubate today  May need thoracentesis, suspect pleural effusion will be transudative in nature     Hemodynamics   Still fluid overloaded approximately 1.4 L since admission  No further episodes of bradycardia  Echo from February of this year showed normal EF and no evidence of pulmonary hypertension    Gastrointestinal/Nutrition   Hold tube feeds hopefully will be able to extubate  GI prophylaxis    Renal   Bumex increased to 3 times a day    Infectious Disease   Sputum culture was finally be able to collected today  Continue Merrem for now    Hematology/Oncology   DVT prophylaxis  Globin slightly dropped but no obvious active bleeding    Endocrine   Blood sugars acceptable    Social/Spiritual/DNR/Disposition/Other   Discussed and updated daughter at bedside      Critical Care Time   35 min    Electronically signed by Casey Calles MD on 3/9/2022 at 10:59 AM

## 2022-03-09 NOTE — PROGRESS NOTES
Department of Internal Medicine  Nephrology Portia Chaudhry MD   progress note    Reason for consultation: Management of acute kidney injury superimposed on chronic kidney disease stage III. Consulting physician: Zev Patel MD.    Interval history:  Patient was seen and examined today in the intensive care unit where she remains intubated and on ventilator support with FiO2 30%. She is nonoliguric. X-ray today showed continued by basilar edema and pleural effusions which has slightly progressed on the right    History of present illness: This is a 66 y.o. female with a significant past medical history of Heart Failure with reduced ejection fraction [HFrEF with LVEF 45 to 50% in July 2020 secondary to ischemic cardiomyopathy], Coronary artery disease, Meningioma, type 2 diabetes mellitus [with nonproliferative diabetic retinopathy], essential hypertension, bilateral deafness and chronic kidney disease stage III [baseline serum creatinine 1.6 mg/dL and follows up with Dr. Radha Mccloud of renal services of [facility] whom she saw 1 week ago and was switched from Lasix to Bumex], presented with complaints of fatigue and dyspnea with exertion progressively worsened over 2 weeks. She had been recently admitted and seen by us at [facility] on 2/11/2022. Laboratory studies at presentation however were remarkable for hemoglobin 7.4 g/dL and BUN/creatinine 110/2.11 mg/dL. She denies hemoptysis or melena stools. However, hemoglobin dropped overnight to 4.4 g/dL and she is scheduled for endoscopy tomorrow.     Scheduled Meds:   bumetanide  2 mg IntraVENous TID    metoprolol tartrate  25 mg Oral BID    meropenem  1,000 mg IntraVENous Q12H    ipratropium-albuterol  1 ampule Inhalation Q4H    sodium chloride flush  5-40 mL IntraVENous 2 times per day    allopurinol  300 mg Oral Daily    atorvastatin  40 mg Oral Daily    magnesium oxide  400 mg Oral BID    miconazole   Topical BID    therapeutic LAKHWINDERUA NEGATIVE 02/22/2022    AMORPHOUS NOT REPORTED 02/07/2022     Urine Protein:     Lab Results   Component Value Date     10/27/2020     Urine Creatinine:     Lab Results   Component Value Date    LABCREA 26.2 05/26/2021     Imaging studies. Chest x-ray increasing congestive changes small-to-moderate bilateral pleural effusion      IMPRESSION/RECOMMENDATIONS:      1. Acute kidney injury superimposed on chronic kidney disease stage III - most consistent with prerenal azotemia led to dehydration and anemia. Patient has ongoing indications for continuation of diuretics. Monitor urine output closely. Avoid nephrotoxic agents. Basic metabolic profile daily. 2.  Systemic hypertension - Continue current antihypertensive regimen. 3.  Acute on chronic anemia - rule out GI bleed. Hemoglobin today 7.9 g/dL. Plavix is on hold. 4.  Decompensated heart failure with preserved ejection fraction [HFpEF] - continue Bumex 2 mg IV 3 times daily. Prognosis is guarded.     Sameer Beckett MD   Attending Nephrologist  3/9/2022 2:09 PM

## 2022-03-09 NOTE — PROGRESS NOTES
The tube was secured with an endotracheal tube bobby. The endotracheal tube was moved and the skin assessed. Skin assessment revealed no problems. Endotracheal tube was taped at the  21.5 cm brayan. Oral gastric tube was retaped to the endotracheal tube. Endotracheal tube bobby was applied on March 8.      Karl Haynes RCP  11:34 PM

## 2022-03-09 NOTE — PROGRESS NOTES
[facility]   Progress Note                   Date:   3/9/2022  Patient name: Freddy Crow  Date of admission:  3/1/2022  9:47 PM  MRN:   [id number]  YOB: 1950  PCP: Chelly Harrison MD    Reason for Admission:  symptomatic anemia/GIB/Hypoxic reps failure     Subjective:       Seen & examined alone in room. No acute CV issues/concerns overnight. Labs, vitals, & tele reviewed. Remains sedated on vent. Medications:   Scheduled Meds:   bumetanide  2 mg IntraVENous TID    metoprolol tartrate  25 mg Oral BID    polyvinyl alcohol  1 drop Both Eyes Q4H    And    artificial tears   Both Eyes Q4H    chlorhexidine  15 mL Mouth/Throat BID    meropenem  1,000 mg IntraVENous Q12H    ipratropium-albuterol  1 ampule Inhalation Q4H    sodium chloride flush  5-40 mL IntraVENous 2 times per day    allopurinol  300 mg Oral Daily    atorvastatin  40 mg Oral Daily    chlorhexidine   Topical Daily    [Held by provider] clopidogrel  75 mg Oral Daily    magnesium oxide  400 mg Oral BID    miconazole   Topical BID    therapeutic multivitamin-minerals  1 tablet Oral Daily    pantoprazole  40 mg IntraVENous Daily    And    sodium chloride flush  10 mL IntraVENous Daily    cetirizine HCl  5 mg Oral Daily       Continuous Infusions:   propofol 10 mcg/kg/min (03/09/22 0527)    sodium chloride      sodium chloride         CBC:   Recent Labs     03/07/22  0553 03/07/22  2216 03/08/22  0432 03/08/22  1802 03/09/22  0448   WBC 6.9  --  7.6  --  7.4   HGB 7.7*   < > 8.2* 8.4* 7.7*     --  231  --  251    < > = values in this interval not displayed. BMP:    Recent Labs     03/07/22  0553 03/08/22  0432 03/09/22  0448    135 136   K 4.5 3.9 4.1    102 103   CO2 22 21 25   BUN 72* 67* 63*   CREATININE 1.79* 1.69* 1.76*   GLUCOSE 126* 97 171*     Hepatic:   No results for input(s): AST, ALT, ALB, BILITOT, ALKPHOS in the last 72 hours.   Troponin: No results for input(s): TROPONINI in the last 72 hours. BNP: No results for input(s): BNP in the last 72 hours. Lipids: No results for input(s): CHOL, HDL in the last 72 hours. Invalid input(s): LDLCALCU  INR: No results for input(s): INR in the last 72 hours. Objective:   Vitals: BP (!) 146/62   Pulse 80   Temp 99.2 °F (37.3 °C) (Oral)   Resp 13   Ht 4' 11\" (1.499 m)   Wt 146 lb 13.2 oz (66.6 kg)   SpO2 98%   BMI 29.66 kg/m²   Constitutional and General Appearance: intubated sedated  HEENT: sedated  Respiratory:  · Intubated on vent  · Clear but diminished throughout. No rales  · No distress  Cardiovascular:  · Heart tones are crisp and normal. regular S1 and S2.  · Tele SR  · Peripheral pulses are symmetrical and full   Abdomen:   · Soft  · No masses or tenderness  Extremities:  ·  No Cyanosis or Clubbing  ·  Lower extremity edema: No  ·  Skin: Warm and dry  Neurological:  · Sedated        EKG 3/1/2022: NSR, LVH, Inferior infarct, NS ST T changes. Previous cardiac testing:   CATH 7/21/06:   Patent LIMA to LAD and SVG to RCA.  Occluded SVG to OM 2.  Occluded RCA.      CABG with LIMA-LAD, SVG-OM and SVG-RCA in 2003.     TTE/CV July 2020  Summary:   1. A KARLA was performed without complications. 2. Normal LV size with normal LVEF. 3. No thrombus or valvular vegetation identified  4. Moderate atheromatous disease noted in aortic arch     CARDIOVERSION:  After an adequate level of sedation was achieved, 200J in biphasic synchronized delivery was administered. conversion to normal sinus rhythm. The patient awoke without complications     Echo [birth year]  Summary  Normal left ventricle size and function with an estimated EF > 55%. No segmental wall motion abnormalities seen. Moderate left ventricular hypertrophy. Normal right ventricular size and function. Left atrial dilatation. Aortic valve is trileaflet. Mild aortic stenosis. Mild-moderate aortic insufficiency. Normal aortic root dimension.   Mitral annular SR. Continue PO BB. On Eliquis at home and recommend to resume on discharge when OK with GI  4. HGB remains low but stable.  Continue to follow GI recommendations      MAL Mantilla - CNP

## 2022-03-09 NOTE — PLAN OF CARE
Problem: OXYGENATION/RESPIRATORY FUNCTION  Goal: Patient will maintain patent airway  Outcome: Met This Shift  Goal: Patient will achieve/maintain normal respiratory rate/effort  Outcome: Met This Shift

## 2022-03-09 NOTE — FLOWSHEET NOTE
Shift assessment completed. Patient opens eyes to name. Nods head yes and no to questions. Patient nodded appropriately to her name and that she is in [facility] in hospital. Was unable to nod appropriately to what hospital or why she is here. Patient able to follow commands and moves all extremities. Patient did become slightly restless during assessment, oral care and repositioning. Calmed quickly after.

## 2022-03-10 ENCOUNTER — APPOINTMENT (OUTPATIENT)
Dept: ULTRASOUND IMAGING | Age: 72
DRG: 377 | End: 2022-03-10
Payer: OTHER GOVERNMENT

## 2022-03-10 ENCOUNTER — APPOINTMENT (OUTPATIENT)
Dept: GENERAL RADIOLOGY | Age: 72
DRG: 377 | End: 2022-03-10
Payer: OTHER GOVERNMENT

## 2022-03-10 LAB
ANION GAP SERPL CALCULATED.3IONS-SCNC: 9 MMOL/L (ref 9–17)
APPEARANCE FLUID: NORMAL
BUN BLDV-MCNC: 60 MG/DL (ref 8–23)
CALCIUM SERPL-MCNC: 8.2 MG/DL (ref 8.6–10.4)
CHLORIDE BLD-SCNC: 102 MMOL/L (ref 98–107)
CO2: 28 MMOL/L (ref 20–31)
COLOR FLUID: YELLOW
CREAT SERPL-MCNC: 1.48 MG/DL (ref 0.5–0.9)
GFR AFRICAN AMERICAN: 42 ML/MIN
GFR NON-AFRICAN AMERICAN: 35 ML/MIN
GFR SERPL CREATININE-BSD FRML MDRD: ABNORMAL ML/MIN/{1.73_M2}
GLUCOSE BLD-MCNC: 108 MG/DL (ref 70–99)
GLUCOSE BLD-MCNC: 161 MG/DL (ref 65–105)
GLUCOSE, FLUID: 126 MG/DL
HCT VFR BLD CALC: 24.7 % (ref 36–46)
HEMOGLOBIN: 8 G/DL (ref 12–16)
LACTATE DEHYDROGENASE, FLUID: 51 U/L
LACTATE DEHYDROGENASE: 222 U/L (ref 135–214)
MAGNESIUM: 2.2 MG/DL (ref 1.6–2.6)
MCH RBC QN AUTO: 30.2 PG (ref 26–34)
MCHC RBC AUTO-ENTMCNC: 32.3 G/DL (ref 31–37)
MCV RBC AUTO: 93.5 FL (ref 80–100)
PDW BLD-RTO: 18 % (ref 11.5–14.9)
PH FLUID: 8
PLATELET # BLD: 183 K/UL (ref 150–450)
PMV BLD AUTO: 8.7 FL (ref 6–12)
POTASSIUM SERPL-SCNC: 4.1 MMOL/L (ref 3.7–5.3)
RBC # BLD: 2.65 M/UL (ref 4–5.2)
RBC FLUID: 325 /MM3
SODIUM BLD-SCNC: 139 MMOL/L (ref 135–144)
SPECIMEN TYPE: NORMAL
TOTAL PROTEIN, BODY FLUID: <1 G/DL
TOTAL PROTEIN: 4.7 G/DL (ref 6.4–8.3)
WBC # BLD: 8.2 K/UL (ref 3.5–11)
WBC FLUID: 320 /MM3

## 2022-03-10 PROCEDURE — 2700000000 HC OXYGEN THERAPY PER DAY

## 2022-03-10 PROCEDURE — 2580000003 HC RX 258: Performed by: INTERNAL MEDICINE

## 2022-03-10 PROCEDURE — 36415 COLL VENOUS BLD VENIPUNCTURE: CPT

## 2022-03-10 PROCEDURE — 87075 CULTR BACTERIA EXCEPT BLOOD: CPT

## 2022-03-10 PROCEDURE — 83986 ASSAY PH BODY FLUID NOS: CPT

## 2022-03-10 PROCEDURE — 0W993ZZ DRAINAGE OF RIGHT PLEURAL CAVITY, PERCUTANEOUS APPROACH: ICD-10-PCS | Performed by: INTERNAL MEDICINE

## 2022-03-10 PROCEDURE — 2060000000 HC ICU INTERMEDIATE R&B

## 2022-03-10 PROCEDURE — 6370000000 HC RX 637 (ALT 250 FOR IP): Performed by: INTERNAL MEDICINE

## 2022-03-10 PROCEDURE — 83615 LACTATE (LD) (LDH) ENZYME: CPT

## 2022-03-10 PROCEDURE — 6370000000 HC RX 637 (ALT 250 FOR IP)

## 2022-03-10 PROCEDURE — 85027 COMPLETE CBC AUTOMATED: CPT

## 2022-03-10 PROCEDURE — 94761 N-INVAS EAR/PLS OXIMETRY MLT: CPT

## 2022-03-10 PROCEDURE — 87205 SMEAR GRAM STAIN: CPT

## 2022-03-10 PROCEDURE — 2500000003 HC RX 250 WO HCPCS: Performed by: INTERNAL MEDICINE

## 2022-03-10 PROCEDURE — 88112 CYTOPATH CELL ENHANCE TECH: CPT

## 2022-03-10 PROCEDURE — 32555 ASPIRATE PLEURA W/ IMAGING: CPT

## 2022-03-10 PROCEDURE — C9113 INJ PANTOPRAZOLE SODIUM, VIA: HCPCS | Performed by: INTERNAL MEDICINE

## 2022-03-10 PROCEDURE — 82947 ASSAY GLUCOSE BLOOD QUANT: CPT

## 2022-03-10 PROCEDURE — 6360000002 HC RX W HCPCS: Performed by: INTERNAL MEDICINE

## 2022-03-10 PROCEDURE — 82945 GLUCOSE OTHER FLUID: CPT

## 2022-03-10 PROCEDURE — 94640 AIRWAY INHALATION TREATMENT: CPT

## 2022-03-10 PROCEDURE — 87070 CULTURE OTHR SPECIMN AEROBIC: CPT

## 2022-03-10 PROCEDURE — 99233 SBSQ HOSP IP/OBS HIGH 50: CPT | Performed by: INTERNAL MEDICINE

## 2022-03-10 PROCEDURE — 71045 X-RAY EXAM CHEST 1 VIEW: CPT

## 2022-03-10 PROCEDURE — 97166 OT EVAL MOD COMPLEX 45 MIN: CPT

## 2022-03-10 PROCEDURE — 83735 ASSAY OF MAGNESIUM: CPT

## 2022-03-10 PROCEDURE — 88305 TISSUE EXAM BY PATHOLOGIST: CPT

## 2022-03-10 PROCEDURE — 84157 ASSAY OF PROTEIN OTHER: CPT

## 2022-03-10 PROCEDURE — 80048 BASIC METABOLIC PNL TOTAL CA: CPT

## 2022-03-10 PROCEDURE — 84155 ASSAY OF PROTEIN SERUM: CPT

## 2022-03-10 RX ORDER — METOPROLOL TARTRATE 50 MG/1
50 TABLET, FILM COATED ORAL 2 TIMES DAILY
Status: DISCONTINUED | OUTPATIENT
Start: 2022-03-10 | End: 2022-03-17 | Stop reason: HOSPADM

## 2022-03-10 RX ADMIN — IPRATROPIUM BROMIDE AND ALBUTEROL SULFATE 1 AMPULE: 2.5; .5 SOLUTION RESPIRATORY (INHALATION) at 15:20

## 2022-03-10 RX ADMIN — ANTI-FUNGAL POWDER MICONAZOLE NITRATE TALC FREE: 1.42 POWDER TOPICAL at 09:42

## 2022-03-10 RX ADMIN — PANTOPRAZOLE SODIUM 40 MG: 40 INJECTION, POWDER, FOR SOLUTION INTRAVENOUS at 09:58

## 2022-03-10 RX ADMIN — SODIUM CHLORIDE 25 ML: 9 INJECTION, SOLUTION INTRAVENOUS at 21:50

## 2022-03-10 RX ADMIN — MAGNESIUM OXIDE TAB 400 MG (241.3 MG ELEMENTAL MG) 400 MG: 400 (241.3 MG) TAB at 09:41

## 2022-03-10 RX ADMIN — MULTIPLE VITAMINS W/ MINERALS TAB 1 TABLET: TAB at 09:42

## 2022-03-10 RX ADMIN — SODIUM CHLORIDE, PRESERVATIVE FREE 10 ML: 5 INJECTION INTRAVENOUS at 09:43

## 2022-03-10 RX ADMIN — ANTI-FUNGAL POWDER MICONAZOLE NITRATE TALC FREE: 1.42 POWDER TOPICAL at 21:43

## 2022-03-10 RX ADMIN — IPRATROPIUM BROMIDE AND ALBUTEROL SULFATE 1 AMPULE: 2.5; .5 SOLUTION RESPIRATORY (INHALATION) at 23:29

## 2022-03-10 RX ADMIN — MAGNESIUM OXIDE TAB 400 MG (241.3 MG ELEMENTAL MG) 400 MG: 400 (241.3 MG) TAB at 21:39

## 2022-03-10 RX ADMIN — BUMETANIDE 2 MG: 0.25 INJECTION, SOLUTION INTRAMUSCULAR; INTRAVENOUS at 21:35

## 2022-03-10 RX ADMIN — BUMETANIDE 2 MG: 0.25 INJECTION, SOLUTION INTRAMUSCULAR; INTRAVENOUS at 14:00

## 2022-03-10 RX ADMIN — MEROPENEM 1000 MG: 1 INJECTION, POWDER, FOR SOLUTION INTRAVENOUS at 21:51

## 2022-03-10 RX ADMIN — ACETAMINOPHEN 650 MG: 325 TABLET, FILM COATED ORAL at 05:33

## 2022-03-10 RX ADMIN — METOPROLOL TARTRATE 50 MG: 50 TABLET, FILM COATED ORAL at 21:39

## 2022-03-10 RX ADMIN — ACETAMINOPHEN 650 MG: 325 TABLET, FILM COATED ORAL at 21:39

## 2022-03-10 RX ADMIN — ATORVASTATIN CALCIUM 40 MG: 40 TABLET, FILM COATED ORAL at 09:42

## 2022-03-10 RX ADMIN — ALLOPURINOL 300 MG: 300 TABLET ORAL at 09:41

## 2022-03-10 RX ADMIN — METOPROLOL TARTRATE 50 MG: 50 TABLET, FILM COATED ORAL at 09:41

## 2022-03-10 RX ADMIN — ACETAMINOPHEN 650 MG: 325 TABLET, FILM COATED ORAL at 16:11

## 2022-03-10 RX ADMIN — IPRATROPIUM BROMIDE AND ALBUTEROL SULFATE 1 AMPULE: 2.5; .5 SOLUTION RESPIRATORY (INHALATION) at 11:28

## 2022-03-10 RX ADMIN — IPRATROPIUM BROMIDE AND ALBUTEROL SULFATE 1 AMPULE: 2.5; .5 SOLUTION RESPIRATORY (INHALATION) at 04:01

## 2022-03-10 RX ADMIN — SODIUM CHLORIDE, PRESERVATIVE FREE 10 ML: 5 INJECTION INTRAVENOUS at 21:40

## 2022-03-10 RX ADMIN — MEROPENEM 1000 MG: 1 INJECTION, POWDER, FOR SOLUTION INTRAVENOUS at 10:01

## 2022-03-10 RX ADMIN — IPRATROPIUM BROMIDE AND ALBUTEROL SULFATE 1 AMPULE: 2.5; .5 SOLUTION RESPIRATORY (INHALATION) at 07:22

## 2022-03-10 RX ADMIN — BUMETANIDE 2 MG: 0.25 INJECTION, SOLUTION INTRAMUSCULAR; INTRAVENOUS at 09:43

## 2022-03-10 RX ADMIN — IPRATROPIUM BROMIDE AND ALBUTEROL SULFATE 1 AMPULE: 2.5; .5 SOLUTION RESPIRATORY (INHALATION) at 19:49

## 2022-03-10 ASSESSMENT — PAIN SCALES - GENERAL
PAINLEVEL_OUTOF10: 0
PAINLEVEL_OUTOF10: 3
PAINLEVEL_OUTOF10: 4
PAINLEVEL_OUTOF10: 0
PAINLEVEL_OUTOF10: 8

## 2022-03-10 ASSESSMENT — ENCOUNTER SYMPTOMS
RHINORRHEA: 0
SORE THROAT: 1
NAUSEA: 0
VOMITING: 0
COUGH: 1
SHORTNESS OF BREATH: 1
EYE REDNESS: 0

## 2022-03-10 ASSESSMENT — PAIN DESCRIPTION - DESCRIPTORS
DESCRIPTORS: ACHING
DESCRIPTORS: ACHING

## 2022-03-10 ASSESSMENT — PAIN DESCRIPTION - PAIN TYPE
TYPE: ACUTE PAIN
TYPE: ACUTE PAIN

## 2022-03-10 ASSESSMENT — PAIN DESCRIPTION - LOCATION
LOCATION: HEAD
LOCATION: HEAD

## 2022-03-10 NOTE — PROGRESS NOTES
Department of Internal Medicine  Nephrology Cherie Loera MD  Progress note    Reason for consultation: Management of acute kidney injury superimposed on chronic kidney disease stage III. Consulting physician: Mitzy Spann MD.    Interval history: Patient was seen and examined this morning in the intensive care unit where she will was comfortable on room air. She was extubated midday on 3/9/2022. She is hemodynamically stable but quite anxious. She underwent right thoracentesis with removal of 800 mL of pleural fluid this morning. Laboratory studies were reviewed. History of present illness: This is a 66 y.o. female with a significant past medical history of Heart Failure with reduced ejection fraction [HFrEF with LVEF 45 to 50% in July 2020 secondary to ischemic cardiomyopathy], Coronary artery disease, Meningioma, type 2 diabetes mellitus [with nonproliferative diabetic retinopathy], essential hypertension, bilateral deafness and chronic kidney disease stage III [baseline serum creatinine 1.6 mg/dL and follows up with Dr. Lonnie Degroot of renal services of [facility] whom she saw 1 week ago and was switched from Lasix to Bumex], presented with complaints of fatigue and dyspnea with exertion progressively worsened over 2 weeks. She had been recently admitted and seen by us at [facility] on 2/11/2022. Laboratory studies at presentation however were remarkable for hemoglobin 7.4 g/dL and BUN/creatinine 110/2.11 mg/dL. She denies hemoptysis or melena stools. However, hemoglobin dropped overnight to 4.4 g/dL and she is scheduled for endoscopy tomorrow.     Scheduled Meds:   metoprolol tartrate  50 mg Oral BID    bumetanide  2 mg IntraVENous TID    meropenem  1,000 mg IntraVENous Q12H    ipratropium-albuterol  1 ampule Inhalation Q4H    sodium chloride flush  5-40 mL IntraVENous 2 times per day    allopurinol  300 mg Oral Daily    atorvastatin  40 mg Oral Daily    magnesium oxide 400 mg Oral BID    miconazole   Topical BID    therapeutic multivitamin-minerals  1 tablet Oral Daily    pantoprazole  40 mg IntraVENous Daily    And    sodium chloride flush  10 mL IntraVENous Daily     Continuous Infusions:   sodium chloride      sodium chloride       PRN Meds:.sodium chloride flush, sodium chloride, ondansetron **OR** ondansetron, magnesium hydroxide, acetaminophen **OR** acetaminophen, sodium chloride    Physical Exam:    VITALS:  BP (!) 143/41   Pulse 80   Temp 97.9 °F (36.6 °C) (Oral)   Resp 15   Ht 4' 11\" (1.499 m)   Wt 146 lb 13.2 oz (66.6 kg)   SpO2 95%   BMI 29.66 kg/m²     Constitutional: Patient is sedated, intubated on ventilator    Skin: Skin color, texture, turgor normal. No rashes or lesions    Head: Normocephalic, without obvious abnormality, atraumatic     Cardiovascular/Edema: S1, S2, regular rate and rhythm with no pericardial rub. Respiratory: Diminished breath sounds bilaterally [right worse than left. Abdomen: Full, soft with normal bowel sounds. Back: symmetric, no curvature. ROM normal. No CVA tenderness. Extremities: Bilateral pedal edema.   Neuro:  Grossly normal      CBC:   Recent Labs     03/09/22  0448 03/09/22  1211 03/10/22  0435   WBC 7.4 8.3 8.2   HGB 7.7* 7.9* 8.0*    233 183     BMP:    Recent Labs     03/08/22  0432 03/09/22  0448 03/10/22  0435    136 139   K 3.9 4.1 4.1    103 102   CO2 21 25 28   BUN 67* 63* 60*   CREATININE 1.69* 1.76* 1.48*   GLUCOSE 97 171* 108*       Lab Results   Component Value Date    NITRU NEGATIVE 02/22/2022    COLORU Yellow 02/22/2022    PHUR 5.0 02/22/2022    WBCUA 10 TO 20 02/22/2022    RBCUA TOO NUMEROUS TO COUNT 02/22/2022    MUCUS NOT REPORTED 02/07/2022    TRICHOMONAS NOT REPORTED 02/07/2022    YEAST NOT REPORTED 02/07/2022    BACTERIA None 02/22/2022    SPECGRAV 1.016 02/22/2022    LEUKOCYTESUR NEGATIVE 02/22/2022    UROBILINOGEN Normal 02/22/2022    BILIRUBINUR NEGATIVE 02/22/2022 GLUCOSEU TRACE 02/22/2022    KETUA NEGATIVE 02/22/2022    AMORPHOUS NOT REPORTED 02/07/2022     Urine Protein:     Lab Results   Component Value Date     10/27/2020     Urine Creatinine:     Lab Results   Component Value Date    LABCREA 26.2 05/26/2021     Imaging studies. Chest x-ray increasing congestive changes small-to-moderate bilateral pleural effusion      IMPRESSION/RECOMMENDATIONS:      1. Acute kidney injury superimposed on chronic kidney disease stage III - most consistent with prerenal azotemia led to dehydration and anemia. Patient has ongoing indications for continuation of diuretics. Renal function is improving with BUN/creatinine down to 60/1.48 mg/dL today. Monitor urine output closely. Avoid nephrotoxic agents. Basic metabolic profile daily. 2.  Systemic hypertension - Blood pressure is adequately controlled. 3.  Acute on chronic anemia - rule out GI bleed. Hemoglobin today is 8.0 g/dL. 4.  Decompensated heart failure with preserved ejection fraction [HFpEF] - continue Bumex 2 mg IV 3 times daily. 5.  Right pleural effusion s/p thoracentesis [3/10/2022]. Prognosis is guarded.     Laurie Sarabia MD   Attending Nephrologist  3/10/2022 1:44 PM

## 2022-03-10 NOTE — PLAN OF CARE
Nutrition Problem #1: Mild malnutrition  Intervention: Food and/or Nutrient Delivery: Continue Current Diet,Continue Oral Nutrition Supplement  Nutritional Goals: Meet estimated nutrient needs

## 2022-03-10 NOTE — PROGRESS NOTES
[address]   Occupational Therapy Evaluation  Date: 3/10/22  Patient Name: Reina Ortiz       Room:   MRN: [id number]  Account: [de-identified]   : 1950  (72 y.o.) Gender: female     Discharge Recommendations: The patient would benefit from additional Occupational Therapy after discharge from the facility upon return to their Home. OT Equipment Recommendations  Equipment Needed: Yes  Mobility Devices: ADL Assistive Devices    Referring Practitioner: Dr Sanjuana Wolff  Diagnosis: Chronic stage 3 Kidney Disease  Additional Pertinent Hx: Hard of Hearing    Treatment Diagnosis: Altered/Impaired ability to care for self  Past Medical History:  has a past medical history of Acute CVA (cerebrovascular accident) ([address] Altered mental status, Arthritis, Brain mass, Cellulitis and abscess, Cellulitis and abscess of unspecified site, Cellulitis of both lower extremities, Cellulitis of left lower extremity, Cellulitis of lower extremity, CHF (congestive heart failure) ([address] Chronic kidney disease, unspecified, Coronary atherosclerosis of native coronary artery, Essential hypertension, Essential hypertension, malignant, Hallucinations, Hard of hearing, Head contusion, Hypokalemia, Iron deficiency anemia, unspecified, Meningioma ([address] Myocardial infarction ([address] Other and unspecified hyperlipidemia, Pure hypercholesterolemia, Toxic metabolic encephalopathy, Type II or unspecified type diabetes mellitus without mention of complication, not stated as uncontrolled, Unspecified asthma(493.90), Unspecified venous (peripheral) insufficiency, Unspecified vitamin D deficiency, and UTI (urinary tract infection). Past Surgical History:   has a past surgical history that includes Tonsillectomy and adenoidectomy; Coronary artery bypass graft; intubation (3/7/2022); and Thoracentesis (3/10/2022).     Restrictions  Restrictions/Precautions: Fall Risk,General Precautions     Vitals  Temp: 97.9 °F (36.6 °C)  Pulse: 80  Resp: 15  BP: (!) 143/41  Height: 4' 11\" (149.9 cm)  Weight: 146 lb 13.2 oz (66.6 kg)  BMI (Calculated): 29.7  Oxygen Therapy  SpO2: 95 %  Pulse Oximeter Device Mode: Continuous  Pulse Oximeter Device Location: Finger  O2 Device: Nasal cannula  Skin Assessment: Clean, dry, & intact  Skin Protection for O2 Device: N/A  Location: Nose  FiO2 : 30 %  O2 Flow Rate (L/min): 1 L/min (placed on room air)  End Tidal CO2: 36 (%)  Blood Gas  Performed?: Yes  Rio's Test #1: Collateral flow confirmed  Site #1: Right Radial  Site Prepped #1: Yes  Number of Attempts #1: 1  Pressure Held #1: Yes  Complications #1: None  Post-procedure #1: Standard  Specimen Status #1: Point of care  How Tolerated?: Tolerated well  Level of Consciousness: Alert (0)    Subjective  Subjective: Alert and interacting with family  Comments: Extubated 3-9-22  Overall Orientation Status: Within Functional Limits  Vision  Vision: Impaired  Vision Exceptions:  (glasses are an old prescription and the frames slightly bent from being stepped on)  Hearing  Hearing: Exceptions to [facility]  Hearing Exceptions: Hard of hearing/hearing concerns  Social/Functional History  Lives With: Son (Son works days, daughter calls daily and visits frequently)  Type of Home: House  Home Layout: Two level,1/2 bath on main level,Bed/Bath upstairs  Home Access: Stairs to enter with rails  Entrance Stairs - Number of Steps: 3  Entrance Stairs - Rails: Left  Bathroom Shower/Tub: Tub/Shower unit  Bathroom Toilet: Standard  Bathroom Accessibility: Walker accessible  Home Equipment: Rolling walker  Receives Help From: Family  ADL Assistance: Independent  Homemaking Responsibilities: No  Ambulation Assistance: Independent  Transfer Assistance: Independent  Active : No  Patient's  Info:  Son or daughter drives  Additional Comments: son works days part-time but other family members can stay with pt during the day also       Objective      Cognition  Overall Cognitive Status: WFL  Following Commands: Follows one step commands consistently (Hard of Hearing)  Attention Span: Attends with cues to redirect  Memory: Decreased recall of recent events,Decreased recall of precautions  Safety Judgement: Decreased awareness of need for assistance  Problem Solving: Assistance required to generate solutions,Assistance required to implement solutions  Insights: Decreased awareness of deficits  Initiation: Requires cues for some       ADL  Feeding: Minimal assistance  Grooming: Moderate assistance  UE Bathing: Maximum assistance  LE Bathing: Dependent/Total  UE Dressing: Moderate assistance  LE Dressing: Dependent/Total  Toileting: Dependent/Total  Additional Comments: observation and clinical judgement    UE Function  Hand Dominance  Hand Dominance: Left (schooled to use right hand in childhood)        LUE Strength  Gross LUE Strength: WFL  L Hand General: 4-/5  Left Hand Strength -  (lbs)  Handle Setting 2: 14# (norms 32-54# )  LUE Tone: Normotonic     LUE AROM (degrees)  LUE AROM : WFL     Left Hand AROM (degrees)  Left Hand AROM: WFL  RUE Strength  Gross RUE Strength: WFL  R Hand General: 4/5   Right Hand Strength -  (lbs)  Handle Setting 2: 19# (norms 32-54# )  RUE Tone: Normotonic     RUE AROM (degrees)  RUE AROM : WFL     Right Hand AROM (degrees)  Right Hand AROM: WFL    Fine Motor Skills  Coordination  Movements Are Fluid And Coordinated: Yes                           Mobility                   Bed mobility  Rolling to Left: Moderate assistance  Rolling to Right: Moderate assistance              Assessment  Assessment  Performance deficits / Impairments: Decreased functional mobility ,Decreased ADL status,Decreased strength,Decreased safe awareness,Decreased cognition,Decreased endurance  Treatment Diagnosis: Altered/Impaired ability to care for self  Prognosis: Good  Decision Making: Medium Complexity  History:  Moderate chart review  Exam: 6 areas of altered performance  REQUIRES OT FOLLOW UP: Yes  Discharge Recommendations: Home with assist PRN,Home with nursing aide,Home with Home health OT (Daughter reports her insurance doesn't cover SNF levels of care)  Activity Tolerance: Patient limited by fatigue         Functional Outcome Measures  AM-PAC Daily Activity Inpatient   How much help for putting on and taking off regular lower body clothing?: Total  How much help for Bathing?: A Lot  How much help for Toileting?: Total  How much help for putting on and taking off regular upper body clothing?: A Lot  How much help for taking care of personal grooming?: A Lot  How much help for eating meals?: A Little  AM-[facility] Inpatient Daily Activity Raw Score: 11  AM-PAC Inpatient ADL T-Scale Score : 29.04  ADL Inpatient CMS 0-100% Score: 70.42  ADL Inpatient CMS G-Code Modifier : CL       Goals  Patient Goals   Patient goals : Return Home  Short term goals  Time Frame for Short term goals: By Discharge  Short term goal 1: Patient / Family D/V understanding of Home Program for safety and UE conditioning/strengthening to support safety at home  Short term goal 2: Tolerate 10-25 minutes of general activity to support care tasks with application to home needs  Short term goal 3: D/V understanding of DME/AE/Modified Care techniques to support participateion in Care tasks    Plan  Safety Devices  Safety Devices in place: Yes  Type of devices: Call light within reach,Left in bed,Nurse notified     Plan  Times per week: 3-5 sessions  Times per day: Daily  Current Treatment Recommendations: Strengthening,Functional Mobility Training,Endurance Training,Cognitive Reorientation,Safety Education & Training,Patient/Caregiver Education & Training,Self-Care / ADL,Home Management Training  OT Education  OT Education: OT Role,Plan of Care,Home Exercise Program,Precautions,ADL Adaptive Strategies,Family Education,Orientation  Barriers to Learning: Hard of Hearing    OT Equipment Recommendations  Equipment Needed: Yes  Mobility Devices: ADL Assistive Devices  OT Individual Minutes  Time In: 4558  Time Out: 7602  Minutes: 20    Electronically signed by Merlin Burner, OT on 3/10/22 at 2:32 PM EST

## 2022-03-10 NOTE — PROGRESS NOTES
[facility]   Progress Note                   Date:   3/10/2022  Patient name: Emily Carrera  Date of admission:  3/1/2022  9:47 PM  MRN:   [id number]  YOB: 1950  PCP: Henry Urias MD    Reason for Admission:  symptomatic anemia/GIB/Hypoxic reps failure     Subjective:       Extubated. Denies any cp. Appears NSR on monitor. Medications:   Scheduled Meds:   metoprolol tartrate  50 mg Oral BID    bumetanide  2 mg IntraVENous TID    meropenem  1,000 mg IntraVENous Q12H    ipratropium-albuterol  1 ampule Inhalation Q4H    sodium chloride flush  5-40 mL IntraVENous 2 times per day    allopurinol  300 mg Oral Daily    atorvastatin  40 mg Oral Daily    magnesium oxide  400 mg Oral BID    miconazole   Topical BID    therapeutic multivitamin-minerals  1 tablet Oral Daily    pantoprazole  40 mg IntraVENous Daily    And    sodium chloride flush  10 mL IntraVENous Daily       Continuous Infusions:   sodium chloride      sodium chloride         CBC:   Recent Labs     03/09/22  0448 03/09/22  1211 03/10/22  0435   WBC 7.4 8.3 8.2   HGB 7.7* 7.9* 8.0*    233 183     BMP:    Recent Labs     03/08/22  0432 03/09/22  0448 03/10/22  0435    136 139   K 3.9 4.1 4.1    103 102   CO2 21 25 28   BUN 67* 63* 60*   CREATININE 1.69* 1.76* 1.48*   GLUCOSE 97 171* 108*     Hepatic:   No results for input(s): AST, ALT, ALB, BILITOT, ALKPHOS in the last 72 hours. Troponin: No results for input(s): TROPONINI in the last 72 hours. BNP: No results for input(s): BNP in the last 72 hours. Lipids: No results for input(s): CHOL, HDL in the last 72 hours. Invalid input(s): LDLCALCU  INR: No results for input(s): INR in the last 72 hours.     Objective:   Vitals: BP (!) 146/55   Pulse 93   Temp 97.3 °F (36.3 °C) (Axillary)   Resp 16   Ht 4' 11\" (1.499 m)   Wt 146 lb 13.2 oz (66.6 kg)   SpO2 96%   BMI 29.66 kg/m²   Constitutional and General Appearance: intubated sedated  HEENT: sedated  Respiratory:  · Intubated on vent  · Clear but diminished throughout. No rales  · No distress  Cardiovascular:  · Heart tones are crisp and normal. regular S1 and S2.  · Tele SR  · Peripheral pulses are symmetrical and full   Abdomen:   · Soft  · No masses or tenderness  Extremities:  ·  No Cyanosis or Clubbing  ·  Lower extremity edema: No  ·  Skin: Warm and dry  Neurological:  · Sedated        EKG 3/1/2022: NSR, LVH, Inferior infarct, NS ST T changes. Previous cardiac testing:   CATH 7/21/06:   Patent LIMA to LAD and SVG to RCA.  Occluded SVG to OM 2.  Occluded RCA.      CABG with LIMA-LAD, SVG-OM and SVG-RCA in 2003.     TTE/CV July 2020  Summary:   1. A KARLA was performed without complications. 2. Normal LV size with normal LVEF. 3. No thrombus or valvular vegetation identified  4. Moderate atheromatous disease noted in aortic arch     CARDIOVERSION:  After an adequate level of sedation was achieved, 200J in biphasic synchronized delivery was administered. conversion to normal sinus rhythm. The patient awoke without complications     Echo 9/7/81  Summary  Normal left ventricle size and function with an estimated EF > 55%. No segmental wall motion abnormalities seen. Moderate left ventricular hypertrophy. Normal right ventricular size and function. Left atrial dilatation. Aortic valve is trileaflet. Mild aortic stenosis. Mild-moderate aortic insufficiency. Normal aortic root dimension. Mitral annular calcification is seen. Mild mitral regurgitation. Mild tricuspid regurgitation. Normal right ventricular systolic pressure. No significant pericardial effusion is seen. Pleural effusion present. Assessment & Plan:     1. GI bleed-  - recommend re consult GI to re-eval Bleeding risk with AC    2. Acute on chronic HFpEF-   - diurese per nephro    3. Parox AF- in NSR  - AC on hold due to anemia  - await GI input regarding safety of AC    4.  MV CAD with CABG in 2003 (patent LIMA-LAD and SVG-RCA in 2006)- stable. Continue BB & statin. 5. Effusions- s/p thora on R    Discussed with patient and nursing. Thank you for allowing me to participate in the care of this patient, please do not hesitate to call if you have any questions. Ricardo Caraballo DO, [facility], [address], [address], [address] [facility]  [facility]  [phone]

## 2022-03-10 NOTE — PROGRESS NOTES
ICU Progress Note (Non-Vent)  [facility]    Patient - Gayathri Rivas,  Age - 66 y.o.    - 1950      Room Number -    MRN -  [id number]   Acct # - [de-identified]  Date of Admission -  3/1/2022  9:47 PM    Events of Past 24 Hours   She is alert and oriented, a little bit anxious    Vitals    height is 4' 11\" (1.499 m) and weight is 146 lb 13.2 oz (66.6 kg). Her axillary temperature is 97.3 °F (36.3 °C). Her blood pressure is 135/41 (abnormal) and her pulse is 99. Her respiration is 15 and oxygen saturation is 91%.        Temperature Range: Temp: 97.3 °F (36.3 °C) Temp  Av °F (36.7 °C)  Min: 97.3 °F (36.3 °C)  Max: 98.6 °F (37 °C)  BP Range:  Systolic (10AEX), XCA:989 , Min:122 , SSE:838     Diastolic (26LDZ), JHH:57, Min:29, Max:58    Pulse Range: Pulse  Av.7  Min: 80  Max: 107  Respiration Range: Resp  Av.4  Min: 11  Max: 29  Current Pulse Ox[de-identified]  SpO2: 91 %  24HR Pulse Ox Range:  SpO2  Av.8 %  Min: 91 %  Max: 100 %  Oxygen Amount and Delivery: O2 Flow Rate (L/min): 1 L/min    Wt Readings from Last 3 Encounters:   22 146 lb 13.2 oz (66.6 kg)   22 156 lb 1.4 oz (70.8 kg)   22 143 lb 6.4 oz (65 kg)     I/O       Intake/Output Summary (Last 24 hours) at 3/10/2022 0945  Last data filed at 3/10/2022 0943  Gross per 24 hour   Intake 531.48 ml   Output --   Net 531.48 ml     DRAIN/TUBE OUTPUT       Invasive Lines   ICP PRESSURE RANGE  No data recorded  CVP PRESSURE RANGE  No data recorded      Medications      metoprolol tartrate  50 mg Oral BID    bumetanide  2 mg IntraVENous TID    meropenem  1,000 mg IntraVENous Q12H    ipratropium-albuterol  1 ampule Inhalation Q4H    sodium chloride flush  5-40 mL IntraVENous 2 times per day    allopurinol  300 mg Oral Daily    atorvastatin  40 mg Oral Daily    magnesium oxide  400 mg Oral BID    miconazole   Topical BID  therapeutic multivitamin-minerals  1 tablet Oral Daily    pantoprazole  40 mg IntraVENous Daily    And    sodium chloride flush  10 mL IntraVENous Daily     sodium chloride flush, sodium chloride, ondansetron **OR** ondansetron, magnesium hydroxide, acetaminophen **OR** acetaminophen, sodium chloride  IV Drips/Infusions   sodium chloride      sodium chloride         Diet/Nutrition   ADULT TUBE FEEDING; Orogastric; Other Tube Feeding (specify); Osmolite 1.5; Continuous; 15; Yes; 10; Q 4 hours; 40; 30; Other (specify); no water flushes  ADULT DIET; Full Liquid; Low Fat/Low Chol/High Fiber/2 gm Na  ADULT ORAL NUTRITION SUPPLEMENT; Breakfast, Lunch, Dinner; Standard High Calorie/High Protein Oral Supplement    Exam      Constitutional - Alert, arousable  General Appearance  well developed, well nourished  HEENT -normocephalic, atraumatic. PERRLA  Lungs - Chest expands equally, markedly diminished breath sounds right side  Cardiovascular - Heart sounds are normal.  normal rate and rhythm regular, no murmur, gallop or rub. Abdomen - soft, nontender, nondistended, no masses or organomegaly  Neurologic - CN II-XII are grossly intact.  There are no focal motor deficits  Skin - no bruising or bleeding  Extremities - no cyanosis, clubbing or edema    Lab Results   CBC     Lab Results   Component Value Date    WBC 8.2 03/10/2022    RBC 2.65 03/10/2022    HGB 8.0 03/10/2022    HCT 24.7 03/10/2022     03/10/2022    MCV 93.5 03/10/2022    MCH 30.2 03/10/2022    MCHC 32.3 03/10/2022    RDW 18.0 03/10/2022    NRBC 4 03/06/2022    LYMPHOPCT 8 03/09/2022    MONOPCT 16 03/09/2022    BASOPCT 1 03/09/2022    MONOSABS 1.30 03/09/2022    LYMPHSABS 0.70 03/09/2022    EOSABS 0.10 03/09/2022    BASOSABS 0.10 03/09/2022    DIFFTYPE NOT REPORTED 02/06/2022       BMP   Lab Results   Component Value Date     03/10/2022    K 4.1 03/10/2022     03/10/2022    CO2 28 03/10/2022    BUN 60 03/10/2022    CREATININE 1.48 03/10/2022    GLUCOSE 108 03/10/2022       LFTS  Lab Results   Component Value Date    ALKPHOS 184 03/05/2022    ALT 25 03/05/2022    AST 40 03/05/2022    PROT 4.3 03/05/2022    BILITOT 0.29 03/05/2022    BILIDIR 0.13 03/05/2022    IBILI 0.16 03/05/2022    LABALBU 2.3 03/05/2022       ABG ABGs:   Lab Results   Component Value Date    PHART 7.459 03/09/2022    PO2ART 107.0 03/09/2022    MFI9IOG 39.9 03/09/2022       Lab Results   Component Value Date    MODE PRVC 03/09/2022         INR  No results for input(s): PROTIME, INR in the last 72 hours. APTT  No results for input(s): APTT in the last 72 hours. Lactic Acid  Lab Results   Component Value Date    LACTA 0.7 03/04/2022    LACTA 1.2 02/18/2021    LACTA 1.8 07/25/2020        BNP   No results for input(s): BNP in the last 72 hours.      Cultures       Radiology     CXR      Chest x-ray shows large right pleural effusion        SYSTEMS ASSESSMENT    Acute respiratory failure, hypoxic and hypercapnic, intubated 3/7; extubated 3/9  Acute on chronic diastolic heart failure with worsening bilateral effusions  COPD, clinical diagnosis no PFTs, no pulmonology  History of tobacco use 30+ pack year  Nonmorbid obesity  AZIZA clinical diagnosis  Chronic kidney disease  Anemia  Full CODE STATUS    Extubated without any issues  Reasonable oxygen level but still needs oxygen  Would like to do diagnostic and therapeutic thoracentesis, patient is afraid of needles but she is agreeable  Continue aggressive diuresis  If there is no evidence of parapneumonic effusion or empyema on the thoracentesis fluid, would discontinue antibiotics and observe  Can make ICU intermediate status    Critical Care Time   0 min    Electronically signed by Donte Velazquez MD on 3/10/2022 at 9:45 AM

## 2022-03-10 NOTE — PROCEDURES
Thoracentesis Procedure Note    Pre-operative Diagnosis: Pleural Effusion, right     Post-operative Diagnosis: Same    Indications: Dyspnea    Procedure Details:  Informed consent was obtained. The risks of the procedure were discussed including: infection, bleeding, pain, pneumothorax and failure to remove fluid at all. Time out was done in the usual fashion. Under sterile conditions the patient was properly positioned. A standard kit was used with Chorhexidine solution and sterile drapes. The entry site had been previously identified by ultrasound and marked. 2% Lidocaine was used to anesthetize the rib space at the entry site. A pleural catheter  was inserted into the right pleural space. Fluid was removed through the catheter connected to a vacutainer bottle without  difficulty. After the fluid was drained to completion, the catheter was removed and pressure held over the entry site. A Band-aid was applied to the wound and the procedure completed. CXR was ordered at the end of procedure. Findings: 800 ml of priest yellow pleural fluid was removed from the right  chest.     Complications:  None; patient tolerated the procedure well. Condition: Stable    Plan: Fluid will be sent to the lab for the following analysis:   LDH, Protein, Glucose, Cell count, Differential, Cytology, as well as for infection analysis (Gram stain, Bacterial culture). Serum for LDH and Total Protein will be ordered for Light's Criteria calculations. A follow up chest x-ray was ordered. Attending Attestation: I was present and perfromed the entire procedure.     Electronically signed by Olga Raymundo MD on 3/10/2022 at 11:07 AM

## 2022-03-10 NOTE — PROGRESS NOTES
[id number] [employer]    PROGRESS NOTE             3/10/2022    7:58 AM    Name:   Meet Butler  MRN:     [id number]     Acct:      [de-identified]   Room:   2008/2008-01  IP Day:  8  Admit Date:  3/1/2022  9:47 PM    PCP:  Isabel Robertson MD  Code Status:  Full Code    Subjective:     C/C:   Chief Complaint   Patient presents with    Fatigue     This is a delayed entry note and reflect's the patient's condition and management plan earlier in day. Interval History Status: improved. Patient seen and examined. She was extubated yesterday. She complains of fatigue but feeling better overall. No acute overnight events. Brief History:     Malissa Dougherty a 69-year-old female with past medical history of CKD Stage III, asthma, impaired hearing, COPD (has never been on home O2), asthma (no exacerbations in past 10 years), HTN, CAD s/p stent placed 20 years ago (on Plavix), CVA, chronic A. Fib on Eliquis, Type II diabetes mellitus, gout, sundowning syndrome, and anxiety  scented on 3/2 complaining of weakness, shortness of breath, and fatigue (onset 2/28).   She was found to have hemoglobin 4.4 on admission and was admitted for symptomatic anemia due to suspected acute GI bleeding. 1 unit PRBC transfused on 3/2. Iron sucrose (Venofer) 200 mg IV daily x5 doses completed on 3/6. Subsequently developed delirium with decreased resp status requiring intubation & transfer to ICU on 3/7. Extubated 3/9. Transfer out of ICU on 3/10. Review of Systems:     Review of Systems   Constitutional: Positive for fatigue. Negative for chills and fever. HENT: Positive for sore throat. Negative for rhinorrhea. Eyes: Negative for redness and visual disturbance. Respiratory: Positive for cough (With phlegm) and shortness of breath. Cardiovascular: Negative for chest pain and palpitations. Gastrointestinal: Negative for nausea and vomiting. Genitourinary: Negative for difficulty urinating and dysuria. Musculoskeletal: Positive for myalgias (\"From sitting so much\"). Neurological: Negative for dizziness and light-headedness. Psychiatric/Behavioral: Negative for confusion and decreased concentration. Medications: Allergies:     Allergies   Allergen Reactions    Latex Rash    Aleve [Naproxen Sodium]      Chronic kidney disease stage III, CHF    Pioglitazone Other (See Comments)     Congestive heart failure    Claritin [Loratadine]     Keflex [Cephalexin]     Lisinopril      Needs clarification of contraindication       Current Meds:   Scheduled Meds:    bumetanide  2 mg IntraVENous TID    metoprolol tartrate  25 mg Oral BID    meropenem  1,000 mg IntraVENous Q12H    ipratropium-albuterol  1 ampule Inhalation Q4H    sodium chloride flush  5-40 mL IntraVENous 2 times per day    allopurinol  300 mg Oral Daily    atorvastatin  40 mg Oral Daily    magnesium oxide  400 mg Oral BID    miconazole   Topical BID    therapeutic multivitamin-minerals  1 tablet Oral Daily    pantoprazole  40 mg IntraVENous Daily    And    sodium chloride flush  10 mL IntraVENous Daily     Continuous Infusions:    sodium chloride      sodium chloride       PRN Meds: sodium chloride flush, sodium chloride, ondansetron **OR** ondansetron, magnesium hydroxide, acetaminophen **OR** acetaminophen, sodium chloride    Data:     Past Medical History:   has a past medical history of Acute CVA (cerebrovascular accident) ([facility] [address] Altered mental status, Arthritis, Brain mass, Cellulitis and abscess, Cellulitis and abscess of unspecified site, Cellulitis of both lower extremities, Cellulitis of left lower extremity, Cellulitis of lower extremity, CHF (congestive heart failure) ([facility] [address] Chronic kidney disease, unspecified, Coronary atherosclerosis of native coronary artery, Essential hypertension, Essential hypertension, malignant, Hallucinations, Hard of hearing, Head contusion, Hypokalemia, Iron deficiency anemia, unspecified, Meningioma ([facility] [address] Myocardial infarction ([address] Other and unspecified hyperlipidemia, Pure hypercholesterolemia, Toxic metabolic encephalopathy, Type II or unspecified type diabetes mellitus without mention of complication, not stated as uncontrolled, Unspecified asthma(493.90), Unspecified venous (peripheral) insufficiency, Unspecified vitamin D deficiency, and UTI (urinary tract infection). Social History:   reports that she quit smoking about 22 years ago. She has a 2.50 pack-year smoking history. She has never used smokeless tobacco. She reports previous alcohol use. She reports that she does not use drugs. Family History:   Family History   Problem Relation Age of Onset    Diabetes Mother     Heart Disease Mother     Heart Disease Father     Diabetes Sister     High Blood Pressure Sister     Diabetes Maternal Grandmother        Vitals:  BP (!) 145/40   Pulse 88   Temp 97.3 °F (36.3 °C) (Axillary)   Resp 18   Ht 4' 11\" (1.499 m)   Wt 146 lb 13.2 oz (66.6 kg)   SpO2 92%   BMI 29.66 kg/m²   Temp (24hrs), Av °F (36.7 °C), Min:97.3 °F (36.3 °C), Max:98.6 °F (37 °C)    Recent Labs     22  1211 22  1614   POCGLU 91 117* 96     Vitals:    03/10/22 0603 03/10/22 0630 03/10/22 0700 03/10/22 0722   BP:  (!) 134/41 (!) 145/40    Pulse: 94 89 88    Resp: 22 15 18 18   Temp:       TempSrc:       SpO2: 95% 93% 91% 92%   Weight:       Height:           I/O(24Hr): Intake/Output Summary (Last 24 hours) at 3/10/2022 0758  Last data filed at 3/10/2022 0400  Gross per 24 hour   Intake 511.48 ml   Output --   Net 511.48 ml       Labs:  Recent Results (from the past 24 hour(s))   Culture, Respiratory    Collection Time: 22 11:05 AM    Specimen: Sputum, Suctioned   Result Value Ref Range    Specimen Description . SUCTIONED SPUTUM     Direct Exam <10 NEUTROPHILS/LPF     Direct Exam < 10 EPITHELIAL CELLS/LPF Direct Exam NO ORGANISMS SEEN     Culture PENDING    Lactate, Sepsis    Collection Time: 03/09/22 12:11 PM   Result Value Ref Range    Lactic Acid, Sepsis 0.9 0.5 - 1.9 mmol/L   CBC with Auto Differential    Collection Time: 03/09/22 12:11 PM   Result Value Ref Range    WBC 8.3 3.5 - 11.0 k/uL    RBC 2.63 (L) 4.0 - 5.2 m/uL    Hemoglobin 7.9 (L) 12.0 - 16.0 g/dL    Hematocrit 24.8 (L) 36 - 46 %    MCV 94.4 80 - 100 fL    MCH 30.0 26 - 34 pg    MCHC 31.8 31 - 37 g/dL    RDW 17.8 (H) 11.5 - 14.9 %    Platelets 085 546 - 564 k/uL    MPV 8.5 6.0 - 12.0 fL    Seg Neutrophils 74 (H) 36 - 66 %    Lymphocytes 8 (L) 24 - 44 %    Monocytes 16 (H) 1 - 7 %    Eosinophils % 1 0 - 4 %    Basophils 1 0 - 2 %    Segs Absolute 6.20 1.3 - 9.1 k/uL    Absolute Lymph # 0.70 (L) 1.0 - 4.8 k/uL    Absolute Mono # 1.30 0.1 - 1.3 k/uL    Absolute Eos # 0.10 0.0 - 0.4 k/uL    Basophils Absolute 0.10 0.0 - 0.2 k/uL   Basic Metabolic Panel w/ Reflex to MG    Collection Time: 03/10/22  4:35 AM   Result Value Ref Range    Glucose 108 (H) 70 - 99 mg/dL    BUN 60 (H) 8 - 23 mg/dL    CREATININE 1.48 (H) 0.50 - 0.90 mg/dL    Calcium 8.2 (L) 8.6 - 10.4 mg/dL    Sodium 139 135 - 144 mmol/L    Potassium 4.1 3.7 - 5.3 mmol/L    Chloride 102 98 - 107 mmol/L    CO2 28 20 - 31 mmol/L    Anion Gap 9 9 - 17 mmol/L    GFR Non-African American 35 (L) >60 mL/min    GFR  42 (L) >60 mL/min    GFR Comment         CBC    Collection Time: 03/10/22  4:35 AM   Result Value Ref Range    WBC 8.2 3.5 - 11.0 k/uL    RBC 2.65 (L) 4.0 - 5.2 m/uL    Hemoglobin 8.0 (L) 12.0 - 16.0 g/dL    Hematocrit 24.7 (L) 36 - 46 %    MCV 93.5 80 - 100 fL    MCH 30.2 26 - 34 pg    MCHC 32.3 31 - 37 g/dL    RDW 18.0 (H) 11.5 - 14.9 %    Platelets 290 171 - 874 k/uL    MPV 8.7 6.0 - 12.0 fL   Magnesium    Collection Time: 03/10/22  4:35 AM   Result Value Ref Range    Magnesium 2.2 1.6 - 2.6 mg/dL         Lab Results   Component Value Date/Time    SPECIAL NOT REPORTED 06/11/2021 04:31 PM     Lab Results   Component Value Date/Time    CULTURE PENDING 03/09/2022 11:05 AM         Radiology:    XR CHEST (SINGLE VIEW FRONTAL)    Result Date: 3/7/2022  Increasing congestive changes and small to moderate bilateral pleural effusions with adjacent airspace disease. XR CHEST (2 VW)    Result Date: 3/3/2022  1. Congestive heart failure is most likely given the radiographic findings; pneumonia is also a consideration in areas of consolidation with pleural effusion. 2.  Chronic appearing coarse interstitial densities predominate perihilar regions and lung bases, typical of sequela from smoking or other previous infectious/inflammatory process. 3. Calcific atherosclerosis aorta. 4. Cardiomegaly. CT HEAD WO CONTRAST    Result Date: 3/7/2022  Cerebral atrophy without acute intracranial abnormality. Stable meningioma adjacent to the right temporal lobe anteriorly. RECOMMENDATIONS: Unavailable     CT HEAD WO CONTRAST    Result Date: 3/4/2022  Cerebral atrophy without acute intracranial abnormality. US LIVER    Result Date: 3/2/2022  Diffuse increased heterogeneous liver parenchymal echogenicity could be seen in patients with hepatic steatosis or diffuse hepatocellular process. Patent portal vein with biphasic flow can be seen in patient with tricuspid regurgitation. RECOMMENDATIONS: Unavailable     XR CHEST PORTABLE    Result Date: 3/9/2022  Continued bibasilar edema and pleural effusions, slightly progressed on the right. Life support devices, unchanged. XR CHEST PORTABLE    Result Date: 3/8/2022  1. Improved positioning of the endotracheal tube. 2.  Unchanged small-to-moderate pleural effusions and bibasilar opacities. XR CHEST PORTABLE    Result Date: 3/8/2022  Endotracheal tube tip is in the right main bronchus and should be retracted approximately 4 cm. OG tube tip is in the stomach.  Findings were discussed with Chelo CARBALLO RN (will convey the results to Dr. Yovana Lewis) at 2:04 pm on 3/7/2022. XR CHEST PORTABLE    Result Date: 3/5/2022  No significant change. XR CHEST PORTABLE    Result Date: 3/4/2022  Findings compatible with congestive heart failure with increased bilateral pleural effusion since the prior study     XR CHEST PORTABLE    Result Date: 3/1/2022  1. Cardiomegaly, mild vascular congestion, improved since the prior study 2. Small bilateral pleural effusions        Physical Examination:        Physical Exam  Constitutional:       General: She is not in acute distress. Comments: Patient is found sitting up in bed, awake and alert but appears drowsy/fatigued   HENT:      Head: Normocephalic. Right Ear: External ear normal.      Left Ear: External ear normal.      Nose: Nose normal.   Eyes:      General:         Right eye: No discharge. Left eye: No discharge. Conjunctiva/sclera: Conjunctivae normal.   Cardiovascular:      Rate and Rhythm: Normal rate. Pulmonary:      Effort: No respiratory distress. Breath sounds: Rales present. Comments: On oxygen supplementation via nasal cannula,  Decreased breath sounds  Chest:      Chest wall: No tenderness. Abdominal:      General: There is no distension. Palpations: Abdomen is soft. Tenderness: There is no abdominal tenderness. Musculoskeletal:      Right lower leg: Edema present. Left lower leg: Edema present. Skin:     Comments: Varicose veins noted on bilateral feet,  Periorbital puffiness   Neurological:      General: No focal deficit present. Mental Status: She is alert and oriented to person, place, and time.       Comments: Good memory,   Good hand  bilaterally,  Follows commands   Psychiatric:         Mood and Affect: Mood normal.         Behavior: Behavior normal.           Assessment:        Primary Problem  Acute GI bleeding    Active Hospital Problems    Diagnosis Date Noted    [facility] [E44.1] 03/08/2022    Symptomatic anemia [D64.9]  Family history of colon cancer [Z80.0]     Family history of pancreatic cancer [Z80.0]     Elevated LFTs [R79.89] 02/25/2021    Paroxysmal atrial fibrillation (HCC) [I48.0] 07/28/2020    Stage 3 chronic kidney disease ([facility] [address] [N18.30]        Plan:        Symptomatic Anemia 2/2 suspected GI bleed - stable  Hemoglobin 4.4 admission  Hemoglobin remains low but stable; at 8.0 today  Pantoprazole 40 mg IV daily  GI following; no scoping done d/t AMS, plan for investigations once status improves  Continue to hold Eliquis and Plavix     Acute hypercapnic respiratory failure with AMS - mentation back at baseline, resp status improved  Extubated yesterday  Cetirizine 5 mg p.o. daily - discontinued yesterday  Ipratropium-albuterol nebulizer 1 ampoule every 4 hours  Pulm/Crit Care following; plan for thoracentesis today     Pneumonia, likely bacterial, possibly aspiration PNA  CXR: Continued bibasilar edema and pleural effusions, slightly progressed on the right  Respiratory cultures pending  Meropenem 1 g IV twice daily -day 4     JOY on CKD stage III  Likely prerenal azotemia  Creatinine stable and near baseline  Nephro following: Bumex IV 2 mg 3 times daily   I&O's     Acute on chronic exacerbation of HFpEF and Multivessel CAD status post CABG in 2003  Echo on 2/6: EF > 55%, LVH  Bumetanide 2 mg IV twice daily  Metoprolol tartrate 25 mg p.o. twice daily => increase back to home dose of 50 mg given hypertensive w pulse in the [de-identified] and 90s  Cardiology following, recommend:              Continue BB and statin,               DC Plavix for now,               resume Eliquis on discharge when okay with GI              Diurese per Nephro rec's  Daily weights     Afib  Daily Mag and K checks; supplement to target levels of Mag >2 and K >4     Code: Full  DVT prophylaxis: Hold due to anemia and suspected GI bleed  GI prophylaxis: Pantoprazole 40 mg IV daily  Diet: Regular low-sodium, low potassium, 1500 mL fluid restriction diet  Activity: Up as tolerated  Dispo: Transfer to U      Please note: Use of a speech recognition software was used in the creation of portions of this note and dictation errors, including those of syntax and sound alike word substitutions, may have escaped proofreading.        Sergio Bolaños DO  3/10/2022  7:58 AM

## 2022-03-10 NOTE — PROGRESS NOTES
Comprehensive Nutrition Assessment    Type and Reason for Visit:  Reassess    Nutrition Recommendations/Plan: Diet advanced to 2 gm Na, Low potassium, 1500 ml fluid restriction. Nutrition Assessment:  Pt extubated yesterday with full liquid diet ordered. RN indicated pt didn't take much breakfast and sips of supplement. Writer talked with pt encouraging her to drink the supplements to help bulid her up. Malnutrition Assessment:  Malnutrition Status:  Mild malnutrition    Context:  Acute Illness     Findings of the 6 clinical characteristics of malnutrition:  Energy Intake:  1 - 75% or less of estimated energy requirements for 7 or more days  Weight Loss:  Unable to assess (edema present)     Body Fat Loss:  Unable to assess     Muscle Mass Loss:  Unable to assess    Fluid Accumulation:  1 - Mild Generalized   Strength:  Not Performed    Estimated Daily Nutrient Needs:  Energy (kcal):  3251-7298 kcals based on 25-27 kcals/kg using adm wt 61 kg; Weight Used for Energy Requirements:  Admission     Protein (g):  69-77 gm protein based on 1.6-1.8 gm/kg using IBW.; Weight Used for Protein Requirements:  Ideal          Nutrition Related Findings:  Edema: +1 Generalized. BM- 3-9. Labs & meds reviewed. Wounds:   (Excoriation)       Current Nutrition Therapies:    ADULT ORAL NUTRITION SUPPLEMENT; Breakfast, Lunch, Dinner; Standard High Calorie/High Protein Oral Supplement  ADULT DIET; Regular; Low Sodium (2 gm); Low Potassium (Less than 3000 mg/day); 1500 ml    Anthropometric Measures:  · Height: 4' 11\" (149.9 cm)  · Current Body Weight: 146 lb (66.2 kg)   · Admission Body Weight: 135 lb (61.2 kg)    · Ideal Body Weight: 95 lbs; % Ideal Body Weight 142.1 %   · BMI: 29.5  · BMI Categories: Overweight (BMI 25.0-29. 9) (Based on admission wt)       Nutrition Diagnosis:   · Mild malnutrition related to impaired respiratory function,inadequate protein-energy intake as evidenced by intake 0-25%      Nutrition Interventions:   Food and/or Nutrient Delivery:  Continue Current Diet,Continue Oral Nutrition Supplement  Nutrition Education/Counseling:  No recommendation at this time   Coordination of Nutrition Care:  Continue to monitor while inpatient    Goals:  Meet estimated nutrient needs       Nutrition Monitoring and Evaluation:   Behavioral-Environmental Outcomes:  None Identified   Food/Nutrient Intake Outcomes:  Enteral Nutrition Intake/Tolerance  Physical Signs/Symptoms Outcomes:  Biochemical Data,GI Status,Fluid Status or Edema,Nutrition Focused Physical Findings,Skin,Weight     Discharge Planning: Too soon to determine     Some areas of assessment may be incomplete due to COVID-19 precautions. Zak Rios R.D. L.DANIELLA.   Clinical Dietitian  Office: [phone]

## 2022-03-10 NOTE — CARE COORDINATION
ONGOING DISCHARGE PLAN:    Patient is alert and oriented x4. Drowsy. Spoke with patient regarding discharge plan and patient confirms that plan is still home with family care. Pt does not have coverage for SNF or VNS. Family will provide care for patient . Pt had thoracentesis today and 800ml removed per [facility]. IV Merrem , awaiting sputum cultures    Will continue to follow for additional discharge needs.     Electronically signed by Ida Urbano RN on 3/10/2022 at 2:36 PM

## 2022-03-10 NOTE — PROGRESS NOTES
Patient transferred to unit alert and oriented skin warm and dry , moist cough noted  resp even and nonlabored  No acute resp distress noted. Daughter present for transfer.

## 2022-03-10 NOTE — FLOWSHEET NOTE
03/10/22 0616   Treatment Team Notification   Reason for Communication Evaluate   Team Member Name Dr Gian Hwang Provider   Method of Communication Call   Response See orders   Notification Time 0617   Notified that there is no CXR ordered for this am.

## 2022-03-10 NOTE — PLAN OF CARE
Problem: Skin Integrity:  Goal: Absence of new skin breakdown  Description: Absence of new skin breakdown  Outcome: Ongoing  Note: No new area of skin breakdown noted. Reposition patient frequently to prevent skin breakdown      Problem: Falls - Risk of:  Goal: Will remain free from falls  Description: Will remain free from falls  Outcome: Ongoing  Note: No attempt to get oob. Safe environment provided. Bed down and locked.  Uses call light appropriately

## 2022-03-10 NOTE — PROGRESS NOTES
RN spoke with Dayanara Nunez NP regarding blood cultures that were ordered 3/9. She is not sure why it was ordered and stated if the patients white blood cell count is normal then the patient does not need the cultures sent.

## 2022-03-11 LAB
ANION GAP SERPL CALCULATED.3IONS-SCNC: 8 MMOL/L (ref 9–17)
BASO FLUID: ABNORMAL %
BUN BLDV-MCNC: 62 MG/DL (ref 8–23)
CALCIUM SERPL-MCNC: 8 MG/DL (ref 8.6–10.4)
CHLORIDE BLD-SCNC: 100 MMOL/L (ref 98–107)
CO2: 30 MMOL/L (ref 20–31)
CREAT SERPL-MCNC: 1.49 MG/DL (ref 0.5–0.9)
CULTURE: NORMAL
DIRECT EXAM: NORMAL
EOSINOPHIL FLUID: ABNORMAL %
FLUID DIFF COMMENT: ABNORMAL
GFR AFRICAN AMERICAN: 42 ML/MIN
GFR NON-AFRICAN AMERICAN: 34 ML/MIN
GFR SERPL CREATININE-BSD FRML MDRD: ABNORMAL ML/MIN/{1.73_M2}
GLUCOSE BLD-MCNC: 116 MG/DL (ref 70–99)
GLUCOSE BLD-MCNC: 122 MG/DL (ref 65–105)
GLUCOSE BLD-MCNC: 149 MG/DL (ref 65–105)
GLUCOSE BLD-MCNC: 94 MG/DL (ref 65–105)
GLUCOSE BLD-MCNC: 97 MG/DL (ref 65–105)
HCT VFR BLD CALC: 25.2 % (ref 36–46)
HEMOGLOBIN: 8.2 G/DL (ref 12–16)
LYMPHOCYTES, BODY FLUID: 54 %
MAGNESIUM: 2 MG/DL (ref 1.6–2.6)
MCH RBC QN AUTO: 30.6 PG (ref 26–34)
MCHC RBC AUTO-ENTMCNC: 32.7 G/DL (ref 31–37)
MCV RBC AUTO: 93.5 FL (ref 80–100)
MONOCYTE, FLUID: ABNORMAL %
NEUTROPHIL, FLUID: 15 %
OTHER CELLS FLUID: 31 %
PDW BLD-RTO: 18.8 % (ref 11.5–14.9)
PLATELET # BLD: 253 K/UL (ref 150–450)
PMV BLD AUTO: 8.5 FL (ref 6–12)
POTASSIUM SERPL-SCNC: 4.1 MMOL/L (ref 3.7–5.3)
RBC # BLD: 2.7 M/UL (ref 4–5.2)
SODIUM BLD-SCNC: 138 MMOL/L (ref 135–144)
SPECIMEN DESCRIPTION: NORMAL
SURGICAL PATHOLOGY REPORT: NORMAL
WBC # BLD: 6.4 K/UL (ref 3.5–11)

## 2022-03-11 PROCEDURE — 6370000000 HC RX 637 (ALT 250 FOR IP): Performed by: INTERNAL MEDICINE

## 2022-03-11 PROCEDURE — APPSS30 APP SPLIT SHARED TIME 16-30 MINUTES: Performed by: NURSE PRACTITIONER

## 2022-03-11 PROCEDURE — 80048 BASIC METABOLIC PNL TOTAL CA: CPT

## 2022-03-11 PROCEDURE — 2060000000 HC ICU INTERMEDIATE R&B

## 2022-03-11 PROCEDURE — 6360000002 HC RX W HCPCS: Performed by: INTERNAL MEDICINE

## 2022-03-11 PROCEDURE — 94640 AIRWAY INHALATION TREATMENT: CPT

## 2022-03-11 PROCEDURE — 83735 ASSAY OF MAGNESIUM: CPT

## 2022-03-11 PROCEDURE — 99233 SBSQ HOSP IP/OBS HIGH 50: CPT | Performed by: INTERNAL MEDICINE

## 2022-03-11 PROCEDURE — 2580000003 HC RX 258: Performed by: INTERNAL MEDICINE

## 2022-03-11 PROCEDURE — 82947 ASSAY GLUCOSE BLOOD QUANT: CPT

## 2022-03-11 PROCEDURE — 2700000000 HC OXYGEN THERAPY PER DAY

## 2022-03-11 PROCEDURE — 94761 N-INVAS EAR/PLS OXIMETRY MLT: CPT

## 2022-03-11 PROCEDURE — 2500000003 HC RX 250 WO HCPCS: Performed by: INTERNAL MEDICINE

## 2022-03-11 PROCEDURE — 85027 COMPLETE CBC AUTOMATED: CPT

## 2022-03-11 PROCEDURE — 97164 PT RE-EVAL EST PLAN CARE: CPT

## 2022-03-11 PROCEDURE — 99232 SBSQ HOSP IP/OBS MODERATE 35: CPT | Performed by: INTERNAL MEDICINE

## 2022-03-11 PROCEDURE — 36415 COLL VENOUS BLD VENIPUNCTURE: CPT

## 2022-03-11 PROCEDURE — 97530 THERAPEUTIC ACTIVITIES: CPT

## 2022-03-11 PROCEDURE — C9113 INJ PANTOPRAZOLE SODIUM, VIA: HCPCS | Performed by: INTERNAL MEDICINE

## 2022-03-11 PROCEDURE — 6370000000 HC RX 637 (ALT 250 FOR IP)

## 2022-03-11 RX ADMIN — MAGNESIUM OXIDE TAB 400 MG (241.3 MG ELEMENTAL MG) 400 MG: 400 (241.3 MG) TAB at 20:12

## 2022-03-11 RX ADMIN — ACETAMINOPHEN 650 MG: 325 TABLET, FILM COATED ORAL at 15:20

## 2022-03-11 RX ADMIN — ANTI-FUNGAL POWDER MICONAZOLE NITRATE TALC FREE: 1.42 POWDER TOPICAL at 09:44

## 2022-03-11 RX ADMIN — IPRATROPIUM BROMIDE AND ALBUTEROL SULFATE 1 AMPULE: 2.5; .5 SOLUTION RESPIRATORY (INHALATION) at 11:07

## 2022-03-11 RX ADMIN — ATORVASTATIN CALCIUM 40 MG: 40 TABLET, FILM COATED ORAL at 09:42

## 2022-03-11 RX ADMIN — MULTIPLE VITAMINS W/ MINERALS TAB 1 TABLET: TAB at 09:45

## 2022-03-11 RX ADMIN — MAGNESIUM OXIDE TAB 400 MG (241.3 MG ELEMENTAL MG) 400 MG: 400 (241.3 MG) TAB at 09:43

## 2022-03-11 RX ADMIN — SODIUM CHLORIDE, PRESERVATIVE FREE 10 ML: 5 INJECTION INTRAVENOUS at 09:44

## 2022-03-11 RX ADMIN — METOPROLOL TARTRATE 50 MG: 50 TABLET, FILM COATED ORAL at 20:12

## 2022-03-11 RX ADMIN — PANTOPRAZOLE SODIUM 40 MG: 40 INJECTION, POWDER, FOR SOLUTION INTRAVENOUS at 09:44

## 2022-03-11 RX ADMIN — ANTI-FUNGAL POWDER MICONAZOLE NITRATE TALC FREE: 1.42 POWDER TOPICAL at 20:21

## 2022-03-11 RX ADMIN — METOPROLOL TARTRATE 50 MG: 50 TABLET, FILM COATED ORAL at 09:44

## 2022-03-11 RX ADMIN — IPRATROPIUM BROMIDE AND ALBUTEROL SULFATE 1 AMPULE: 2.5; .5 SOLUTION RESPIRATORY (INHALATION) at 07:28

## 2022-03-11 RX ADMIN — BUMETANIDE 2 MG: 0.25 INJECTION, SOLUTION INTRAMUSCULAR; INTRAVENOUS at 09:43

## 2022-03-11 RX ADMIN — MEROPENEM 1000 MG: 1 INJECTION, POWDER, FOR SOLUTION INTRAVENOUS at 09:43

## 2022-03-11 RX ADMIN — ACETAMINOPHEN 650 MG: 325 TABLET, FILM COATED ORAL at 21:45

## 2022-03-11 RX ADMIN — SODIUM CHLORIDE, PRESERVATIVE FREE 10 ML: 5 INJECTION INTRAVENOUS at 09:45

## 2022-03-11 RX ADMIN — SODIUM CHLORIDE, PRESERVATIVE FREE 10 ML: 5 INJECTION INTRAVENOUS at 20:12

## 2022-03-11 RX ADMIN — BUMETANIDE 2 MG: 0.25 INJECTION, SOLUTION INTRAMUSCULAR; INTRAVENOUS at 14:14

## 2022-03-11 RX ADMIN — BUMETANIDE 2 MG: 0.25 INJECTION, SOLUTION INTRAMUSCULAR; INTRAVENOUS at 20:12

## 2022-03-11 RX ADMIN — IPRATROPIUM BROMIDE AND ALBUTEROL SULFATE 1 AMPULE: 2.5; .5 SOLUTION RESPIRATORY (INHALATION) at 20:05

## 2022-03-11 RX ADMIN — MEROPENEM 1000 MG: 1 INJECTION, POWDER, FOR SOLUTION INTRAVENOUS at 21:47

## 2022-03-11 RX ADMIN — ACETAMINOPHEN 650 MG: 325 TABLET, FILM COATED ORAL at 03:57

## 2022-03-11 RX ADMIN — ALLOPURINOL 300 MG: 300 TABLET ORAL at 09:42

## 2022-03-11 RX ADMIN — IPRATROPIUM BROMIDE AND ALBUTEROL SULFATE 1 AMPULE: 2.5; .5 SOLUTION RESPIRATORY (INHALATION) at 23:22

## 2022-03-11 RX ADMIN — IPRATROPIUM BROMIDE AND ALBUTEROL SULFATE 1 AMPULE: 2.5; .5 SOLUTION RESPIRATORY (INHALATION) at 15:27

## 2022-03-11 ASSESSMENT — PAIN SCALES - GENERAL
PAINLEVEL_OUTOF10: 6
PAINLEVEL_OUTOF10: 0
PAINLEVEL_OUTOF10: 3
PAINLEVEL_OUTOF10: 3
PAINLEVEL_OUTOF10: 5
PAINLEVEL_OUTOF10: 3

## 2022-03-11 NOTE — PROGRESS NOTES
[facility]   Progress Note                   Date:   3/11/2022  Patient name: Ravi Clarke  Date of admission:  3/1/2022  9:47 PM  MRN:   [id number]  YOB: 1950  PCP: Johann Barakat MD    Reason for Admission:  symptomatic anemia/GIB/Hypoxic reps failure     Subjective:       Extubated. Denies any cp. Eating breakfast  Appears much more comfortable than yesterday. Medications:   Scheduled Meds:   metoprolol tartrate  50 mg Oral BID    bumetanide  2 mg IntraVENous TID    meropenem  1,000 mg IntraVENous Q12H    ipratropium-albuterol  1 ampule Inhalation Q4H    sodium chloride flush  5-40 mL IntraVENous 2 times per day    allopurinol  300 mg Oral Daily    atorvastatin  40 mg Oral Daily    magnesium oxide  400 mg Oral BID    miconazole   Topical BID    therapeutic multivitamin-minerals  1 tablet Oral Daily    pantoprazole  40 mg IntraVENous Daily    And    sodium chloride flush  10 mL IntraVENous Daily       Continuous Infusions:   sodium chloride 25 mL (03/10/22 2150)    sodium chloride         CBC:   Recent Labs     03/09/22  1211 03/10/22  0435 03/11/22 0433   WBC 8.3 8.2 6.4   HGB 7.9* 8.0* 8.2*    183 253     BMP:    Recent Labs     03/09/22  0448 03/10/22  0435 03/11/22 0433    139 138   K 4.1 4.1 4.1    102 100   CO2 25 28 30   BUN 63* 60* 62*   CREATININE 1.76* 1.48* 1.49*   GLUCOSE 171* 108* 116*     Hepatic:   No results for input(s): AST, ALT, ALB, BILITOT, ALKPHOS in the last 72 hours. Troponin: No results for input(s): TROPONINI in the last 72 hours. BNP: No results for input(s): BNP in the last 72 hours. Lipids: No results for input(s): CHOL, HDL in the last 72 hours. Invalid input(s): LDLCALCU  INR: No results for input(s): INR in the last 72 hours.     Objective:   Vitals: BP (!) 123/48   Pulse 79   Temp 97.7 °F (36.5 °C) (Oral)   Resp 20   Ht 4' 11\" (1.499 m)   Wt 154 lb 5.2 oz (70 kg)   SpO2 99%   BMI 31.17 kg/m²   Constitutional and General Appearance: intubated sedated  HEENT: sedated  Respiratory:  · Intubated on vent  · Clear but diminished throughout. No rales  · No distress  Cardiovascular:  · Heart tones are crisp and normal. regular S1 and S2.  · Tele SR  · Peripheral pulses are symmetrical and full   Abdomen:   · Soft  · No masses or tenderness  Extremities:  ·  No Cyanosis or Clubbing  ·  Lower extremity edema: No  ·  Skin: Warm and dry  Neurological:  · Sedated        EKG 3/1/2022: NSR, LVH, Inferior infarct, NS ST T changes. Previous cardiac testing:   CATH 7/21/06:   Patent LIMA to LAD and SVG to RCA.  Occluded SVG to OM 2.  Occluded RCA.      CABG with LIMA-LAD, SVG-OM and SVG-RCA in 2003.     TTE/CV July 2020  Summary:   1. A KARLA was performed without complications. 2. Normal LV size with normal LVEF. 3. No thrombus or valvular vegetation identified  4. Moderate atheromatous disease noted in aortic arch     CARDIOVERSION:  After an adequate level of sedation was achieved, 200J in biphasic synchronized delivery was administered. conversion to normal sinus rhythm. The patient awoke without complications     Echo 2/7/38  Summary  Normal left ventricle size and function with an estimated EF > 55%. No segmental wall motion abnormalities seen. Moderate left ventricular hypertrophy. Normal right ventricular size and function. Left atrial dilatation. Aortic valve is trileaflet. Mild aortic stenosis. Mild-moderate aortic insufficiency. Normal aortic root dimension. Mitral annular calcification is seen. Mild mitral regurgitation. Mild tricuspid regurgitation. Normal right ventricular systolic pressure. No significant pericardial effusion is seen. Pleural effusion present. Assessment & Plan:     1. GI bleed-  - recommend re consult GI to re-eval Bleeding risk with AC    2. Acute on chronic HFpEF-   - diurese per nephro    3.  Parox AF- in NSR  - AC on hold due to anemia  - await GI input regarding safety of AC    4. MV CAD with CABG in 2003 (patent LIMA-LAD and SVG-RCA in 2006)- stable. Continue BB & statin. 5. Effusions- s/p thora on R  - pulm following    Discussed with patient and nursing. Thank you for allowing me to participate in the care of this patient, please do not hesitate to call if you have any questions. Kareem Sosa DO, [facility], [address], [address], [place] [address] [facility]  [email or web address]  [phone]

## 2022-03-11 NOTE — PROGRESS NOTES
Department of Internal Medicine  Nephrology Nico Kolb MD  Progress note    Reason for consultation: Management of acute kidney injury superimposed on chronic kidney disease stage III. Consulting physician: Wade Lundborg, MD.    Interval history: Patient was seen and examined this morning. Shortness of breath is slowly improving-patient developed respiratory failure in the ICU and status post extubation on 3/9/2022. .  She underwent right thoracentesis with removal of 800 mL of pleural fluid on 3/10/22  Laboratory studies were reviewed. Renal function is stable    History of present illness: This is a 66 y.o. female with a significant past medical history of Heart Failure with reduced ejection fraction [HFrEF with LVEF 45 to 50% in July 2020 secondary to ischemic cardiomyopathy], Coronary artery disease, Meningioma, type 2 diabetes mellitus [with nonproliferative diabetic retinopathy], essential hypertension, bilateral deafness and chronic kidney disease stage III [baseline serum creatinine 1.6 mg/dL and follows up with Dr. Herchel Meckel of [facility] whom she saw 1 week ago and was switched from Lasix to Bumex], presented with complaints of fatigue and dyspnea with exertion progressively worsened over 2 weeks. She had been recently admitted and seen by us at [facility] on 2/11/2022. Laboratory studies at presentation however were remarkable for hemoglobin 7.4 g/dL and BUN/creatinine 110/2.11 mg/dL. She denies hemoptysis or melena stools. However, hemoglobin dropped overnight to 4.4 g/dL and she is scheduled for endoscopy tomorrow.     Scheduled Meds:   metoprolol tartrate  50 mg Oral BID    bumetanide  2 mg IntraVENous TID    meropenem  1,000 mg IntraVENous Q12H    ipratropium-albuterol  1 ampule Inhalation Q4H    sodium chloride flush  5-40 mL IntraVENous 2 times per day    allopurinol  300 mg Oral Daily    atorvastatin  40 mg Oral Daily    magnesium oxide 400 mg Oral BID    miconazole   Topical BID    therapeutic multivitamin-minerals  1 tablet Oral Daily    pantoprazole  40 mg IntraVENous Daily    And    sodium chloride flush  10 mL IntraVENous Daily     Continuous Infusions:   sodium chloride 25 mL (03/10/22 2150)    sodium chloride       PRN Meds:.sodium chloride flush, sodium chloride, ondansetron **OR** ondansetron, magnesium hydroxide, acetaminophen **OR** acetaminophen, sodium chloride    Physical Exam:    VITALS:  BP (!) 123/48   Pulse 79   Temp 97.7 °F (36.5 °C) (Oral)   Resp 20   Ht 4' 11\" (1.499 m)   Wt 154 lb 5.2 oz (70 kg)   SpO2 97%   BMI 31.17 kg/m²     Constitutional: Patient is sedated, intubated on ventilator    Skin: Skin color, texture, turgor normal. No rashes or lesions    Head: Normocephalic, without obvious abnormality, atraumatic     Cardiovascular/Edema: S1, S2, regular rate and rhythm with no pericardial rub. Respiratory: Diminished breath sounds bilaterally [right worse than left. Abdomen: Full, soft with normal bowel sounds. Back: symmetric, no curvature. ROM normal. No CVA tenderness. Extremities: Bilateral pedal edema.   Neuro:  Grossly normal      CBC:   Recent Labs     03/09/22  1211 03/10/22  0435 03/11/22 0433   WBC 8.3 8.2 6.4   HGB 7.9* 8.0* 8.2*    183 253     BMP:    Recent Labs     03/09/22  0448 03/10/22  0435 03/11/22  0433    139 138   K 4.1 4.1 4.1    102 100   CO2 25 28 30   BUN 63* 60* 62*   CREATININE 1.76* 1.48* 1.49*   GLUCOSE 171* 108* 116*       Lab Results   Component Value Date    NITRU NEGATIVE 02/22/2022    COLORU Yellow 02/22/2022    PHUR 5.0 02/22/2022    WBCUA 10 TO 20 02/22/2022    RBCUA TOO NUMEROUS TO COUNT 02/22/2022    MUCUS NOT REPORTED 02/07/2022    TRICHOMONAS NOT REPORTED 02/07/2022    YEAST NOT REPORTED 02/07/2022    BACTERIA None 02/22/2022    SPECGRAV 1.016 02/22/2022    LEUKOCYTESUR NEGATIVE 02/22/2022    UROBILINOGEN Normal 02/22/2022    BILIRUBINUR

## 2022-03-11 NOTE — PROGRESS NOTES
[place] [id number] Internal Medicine    Progress Note    3/11/2022    2:54 PM    Name:   Freddy Crow  MRN:     [id number]     Acct:      [de-identified]   Room:   Counts include 234 beds at the [facility]  IP Day:  9  Admit Date:  3/1/2022  9:47 PM    PCP:   Chelly Harrison MD  Code Status:  Full Code    Subjective:     C/C:   Chief Complaint   Patient presents with    Fatigue         HPI:     See HPI    Review of Systems:   Positive for shortness of breath no new cough  Denies chest pain or palpitations  Denies abdominal pain, diarrhea vomiting  Denies any new numbness tremors or weakness. Medications: Allergies:     Allergies   Allergen Reactions    Latex Rash    Aleve [Naproxen Sodium]      Chronic kidney disease stage III, CHF    Pioglitazone Other (See Comments)     Congestive heart failure    Claritin [Loratadine]     Keflex [Cephalexin]     Lisinopril      Needs clarification of contraindication       Current Meds:   Scheduled Meds:    metoprolol tartrate  50 mg Oral BID    bumetanide  2 mg IntraVENous TID    meropenem  1,000 mg IntraVENous Q12H    ipratropium-albuterol  1 ampule Inhalation Q4H    sodium chloride flush  5-40 mL IntraVENous 2 times per day    allopurinol  300 mg Oral Daily    atorvastatin  40 mg Oral Daily    magnesium oxide  400 mg Oral BID    miconazole   Topical BID    therapeutic multivitamin-minerals  1 tablet Oral Daily    pantoprazole  40 mg IntraVENous Daily    And    sodium chloride flush  10 mL IntraVENous Daily     Continuous Infusions:    sodium chloride 25 mL (03/10/22 2150)    sodium chloride       PRN Meds: sodium chloride flush, sodium chloride, ondansetron **OR** ondansetron, magnesium hydroxide, acetaminophen **OR** acetaminophen, sodium chloride    Data:     Past Medical History:   has a past medical history of Acute CVA (cerebrovascular accident) ([facility] [address] Altered mental status, Arthritis, Brain mass, Cellulitis and abscess, Cellulitis and abscess of unspecified site, Cellulitis of both lower extremities, Cellulitis of left lower extremity, Cellulitis of lower extremity, CHF (congestive heart failure) ([facility] [address] Chronic kidney disease, unspecified, Coronary atherosclerosis of native coronary artery, Essential hypertension, Essential hypertension, malignant, Hallucinations, Hard of hearing, Head contusion, Hypokalemia, Iron deficiency anemia, unspecified, Meningioma ([facility] [address] Myocardial infarction ([facility] [address] Other and unspecified hyperlipidemia, Pure hypercholesterolemia, Toxic metabolic encephalopathy, Type II or unspecified type diabetes mellitus without mention of complication, not stated as uncontrolled, Unspecified asthma(493.90), Unspecified venous (peripheral) insufficiency, Unspecified vitamin D deficiency, and UTI (urinary tract infection). Social History:   reports that she quit smoking about 22 years ago. She has a 2.50 pack-year smoking history. She has never used smokeless tobacco. She reports previous alcohol use. She reports that she does not use drugs. Family History:   Family History   Problem Relation Age of Onset    Diabetes Mother     Heart Disease Mother     Heart Disease Father     Diabetes Sister     High Blood Pressure Sister     Diabetes Maternal Grandmother        Vitals:  BP (!) 124/48   Pulse 75   Temp 97.4 °F (36.3 °C) (Axillary)   Resp 20   Ht 4' 11\" (1.499 m)   Wt 154 lb 5.2 oz (70 kg)   SpO2 99%   BMI 31.17 kg/m²   Temp (24hrs), Av.5 °F (36.4 °C), Min:97.4 °F (36.3 °C), Max:97.7 °F (36.5 °C)    Recent Labs     22  1614 03/10/22  1930 22  0732 22  1139   POCGLU 96 161* 94 97       I/O (24Hr):     Intake/Output Summary (Last 24 hours) at 3/11/2022 1454  Last data filed at 3/11/2022 0951  Gross per 24 hour   Intake 220 ml   Output 1050 ml   Net -830 ml       Labs:    Lab Results   Component Value Date    WBC 6.4 2022    HGB 8.2 (L) 2022    HCT 25.2 (L) 2022    MCV 93.5 03/11/2022     03/11/2022     Lab Results   Component Value Date     03/11/2022    K 4.1 03/11/2022     03/11/2022    CO2 30 03/11/2022    BUN 62 03/11/2022    CREATININE 1.49 03/11/2022    GLUCOSE 116 03/11/2022    CALCIUM 8.0 03/11/2022          Lab Results   Component Value Date/Time    SPECIAL RIGHT 03/10/2022 11:00 AM     Lab Results   Component Value Date/Time    CULTURE NO GROWTH 12 HOURS 03/10/2022 11:00 AM         Radiology:    Recent data reviewed    Physical Examination:        General appearance:  alert, cooperative and no distress  Eyes: Anicteric sclera. Pupils are equally round and reactive to light. Extraocular movements are intact. Lungs:  B/l reduced air entry, bilateral crackles.    Heart:  regular rate and rhythm, no murmur  Abdomen:  soft, nontender, nondistended, normal bowel sounds, no masses, hepatomegaly, splenomegaly  Extremities:  no edema, redness, tenderness in the calves  Skin:  no gross lesions, rashes, induration  Neuro:  Alert, oriented X 3, no new focal weakness  Assessment:        Primary Problem  Acute GI bleeding    Active Hospital Problems    Diagnosis Date Noted    Mild malnutrition ([facility] [address] [E44.1] 03/08/2022    Symptomatic anemia [D64.9]     Family history of colon cancer [Z80.0]     Family history of pancreatic cancer [Z80.0]     Elevated LFTs [R79.89] 02/25/2021    Paroxysmal atrial fibrillation ([facility] [address] [I48.0] 07/28/2020    Stage 3 chronic kidney disease ([facility] [address] [N18.30]            DVT prophylaxis: Mechanical  Antibiotics: Day 5 of Merrem    Plan:        78-year-old lady with history of atrial fibrillation history of coronary disease stent placed 20 years ago on Eliquis and Plavix 75 baseline hemoglobin was 10 2 weeks ago admitted with gradual increase of fatigue dyspnea exertion sleepy most part of the day no fever no vomiting blood no blood in the stool no dark stools no diarrhea hemoglobin ER was 4.4 suspected GI bleed Eliquis and Plavix held n.p.o. for now upper and lower endoscopies GI consulted  IV Venofer 200 mg daily  1 unit of PRBC given hemoglobin improved to 6 will need another unit of PRBC  Ckd,nephro consult,pt was due to see dr Aneudy Ross for ckd    Needs EGD but has been difficult to obtain due to delirium, CO2 narcosis, respiratory acidosis, CHF exacerbation  Cardiology nephrology pulmonology following    3/7  Was moved out from ICU last night  Did not wear BiPAP overnight, chest x-ray worsening with bilateral effusions-we will await blood recommendation regarding thoracentesis  ABG did not show hypoxia hypercapnia  Was also started on Seroquel yesterday for hallucination anxiety and insomnia-suspect residual sedation from this; will reevaluate later today  EGD canceled for today      3/11  Patient was extubated on 3/9, mental status back to baseline respiration improved, transferred to floor on 3/10  Underwent thoracentesis on the right on 3/ ml out  Continues to be diuresed with IV Bumex 2mg tid  Has bilateral crackes, cough- needs to continue iv diuresis  CXR tomorrow  EGD will be done outpatiet    Ciara Garcia MD  3/11/2022  2:54 PM

## 2022-03-11 NOTE — PLAN OF CARE
Problem: Falls - Risk of:  Goal: Will remain free from falls  Description: Will remain free from falls  Outcome: Met This Shift  Goal: Absence of physical injury  Description: Absence of physical injury  Outcome: Met This Shift     Problem: Skin Integrity:  Goal: Will show no infection signs and symptoms  Description: Will show no infection signs and symptoms  Outcome: Ongoing     Problem: Nutrition  Goal: Optimal nutrition therapy  Outcome: Ongoing     Problem: Pain:  Goal: Pain level will decrease  Description: Pain level will decrease  Outcome: Ongoing  Goal: Control of acute pain  Description: Control of acute pain  Outcome: Ongoing  Goal: Control of chronic pain  Description: Control of chronic pain  Outcome: Ongoing     Problem: OXYGENATION/RESPIRATORY FUNCTION  Goal: Patient will maintain patent airway  Outcome: Ongoing  Goal: Patient will achieve/maintain normal respiratory rate/effort  Description: Respiratory rate and effort will be within normal limits for the patient  Outcome: Ongoing

## 2022-03-11 NOTE — PLAN OF CARE
Problem: Skin Integrity:  Goal: Absence of new skin breakdown  Description: Absence of new skin breakdown  Outcome: Ongoing  Pt free from new skin breakdown this shift. Patient being turned Q2 and using pillow support. Problem: Falls - Risk of:  Goal: Will remain free from falls  Description: Will remain free from falls  Outcome: Ongoing   Pt remained free from falls this shift. Problem: Nutrition  Goal: Optimal nutrition therapy  Outcome: Ongoing  Pt educated on nutrition and fluid restriction guidelines.       Problem: Pain:  Goal: Control of acute pain  Description: Control of acute pain  Outcome: Ongoing

## 2022-03-11 NOTE — PROGRESS NOTES
Physical Therapy  Facility/Department: [facility]  Daily Treatment Note  NAME: Rodriguez Castle  :   MRN: [id number]    Date of Service: 3/11/2022    Discharge Recommendations:  Patient would benefit from continued therapy after discharge   PT Equipment Recommendations  Equipment Needed: Yes  Other: BSC    Assessment   Body structures, Functions, Activity limitations: Decreased functional mobility ; Decreased strength;Decreased endurance;Decreased balance  Assessment: Impaired mobility due to decreased tolerance to activity  REQUIRES PT FOLLOW UP: Yes  Activity Tolerance  Activity Tolerance: Patient limited by endurance     Patient Diagnosis(es): The encounter diagnosis was Symptomatic anemia. has a past medical history of Acute CVA (cerebrovascular accident) ([address] Altered mental status, Arthritis, Brain mass, Cellulitis and abscess, Cellulitis and abscess of unspecified site, Cellulitis of both lower extremities, Cellulitis of left lower extremity, Cellulitis of lower extremity, CHF (congestive heart failure) ([address] Chronic kidney disease, unspecified, Coronary atherosclerosis of native coronary artery, Essential hypertension, Essential hypertension, malignant, Hallucinations, Hard of hearing, Head contusion, Hypokalemia, Iron deficiency anemia, unspecified, Meningioma ([address] Myocardial infarction ([address] Other and unspecified hyperlipidemia, Pure hypercholesterolemia, Toxic metabolic encephalopathy, Type II or unspecified type diabetes mellitus without mention of complication, not stated as uncontrolled, Unspecified asthma(493.90), Unspecified venous (peripheral) insufficiency, Unspecified vitamin D deficiency, and UTI (urinary tract infection). has a past surgical history that includes Tonsillectomy and adenoidectomy; Coronary artery bypass graft; intubation (3/7/2022); and Thoracentesis (3/10/2022).     Restrictions  Restrictions/Precautions  Restrictions/Precautions: Fall Risk  Subjective General  Family / Caregiver Present: Yes  Subjective  Subjective: pt very sleepy  General Comment  Comments: pt is [place]  Pain Screening  Patient Currently in Pain: Denies  Vital Signs  Patient Currently in Pain: Denies          Objective   Bed mobility  Rolling to Left: Maximum assistance  Rolling to Right: Maximum assistance  Supine to Sit: Moderate assistance  Sit to Supine: Moderate assistance;2 Person assistance  Scooting:  Moderate assistance  Comment: pt sat EOB x 8min- pt sits with marked forward head/cervical flexion  Transfers  Comment: pt defered standing due to being \"too tired\"        Balance  Sitting - Static: Fair;+ (SBA)  Sitting - Dynamic: Fair (CGA)  Other exercises  Other exercises 1: seated AROM B LE x 10 ea  Other exercises 2: seated AAROM B UE x 5ea      Goals  Short term goals  Time Frame for Short term goals: 5-7 days  Short term goal 1: bed mobility with Mehdi  Short term goal 2: standing with RW and CGA x 2min for ease of ADLs  Short term goal 3: transfers to/from bed/chair with RW and Mehdi  Short term goal 4: negotiate 3 steps with rail/CGA    Plan    Plan  Times per week: 6-7x/wk  Current Treatment Recommendations: Strengthening,Balance Training,Endurance Training,Functional Mobility Training,Transfer Training  Safety Devices  Type of devices: Bed alarm in place,Call light within reach,Left in bed (family present)     Therapy Time   Individual Concurrent Group Co-treatment   Time In 1415         Time Out 1444         Minutes 1055 [facility], PT

## 2022-03-11 NOTE — PROGRESS NOTES
GI Progress notes    3/11/2022   3:04 PM    Name:  Reynaldo Damon  MRN:    [id number]     Acct:     [de-identified]   Room:  2123/2123-01   Day: 9     Admit Date: 3/1/2022  9:47 PM  PCP: Marta George MD    Subjective:     C/C:   Chief Complaint   Patient presents with    Fatigue       Interval History: Status: not changed. Patient seen and examined. No acute events overnight. Cardiology requesting reevaluation for bleeding risk with [facility]  Still appears short of breath at rest, wheezing  Has productive cough  Hypoxic  On O2 via NC  Extubated 3/9/22    Hgb stable  No overt GI bleeding since admission. ROS:  Constitutional: negative for chills, fevers and sweats  Respiratory: negative for cough, dyspnea on exertion, hemoptysis, and wheezing  Cardiovascular: negative for chest pain, chest pressure/discomfort, dyspnea, lower extremity edema and palpitations  Gastrointestinal: negative for abdominal pain, constipation, diarrhea, nausea and vomiting  Neurological: negative for dizziness and headaches    Medications: Allergies:    Allergies   Allergen Reactions    Latex Rash    Aleve [Naproxen Sodium]      Chronic kidney disease stage III, CHF    Pioglitazone Other (See Comments)     Congestive heart failure    Claritin [Loratadine]     Keflex [Cephalexin]     Lisinopril      Needs clarification of contraindication       Current Meds: metoprolol tartrate (LOPRESSOR) tablet 50 mg, BID  bumetanide (BUMEX) injection 2 mg, TID  meropenem (MERREM) 1,000 mg in sodium chloride 0.9 % 100 mL IVPB (mini-bag), Q12H  ipratropium-albuterol (DUONEB) nebulizer solution 1 ampule, Q4H  sodium chloride flush 0.9 % injection 5-40 mL, 2 times per day  sodium chloride flush 0.9 % injection 10 mL, PRN  0.9 % sodium chloride infusion, PRN  ondansetron (ZOFRAN-ODT) disintegrating tablet 4 mg, Q8H PRN   Or  ondansetron (ZOFRAN) injection 4 mg, Q6H PRN  magnesium hydroxide (MILK OF MAGNESIA) 400 MG/5ML suspension 30 mL, Daily PRN  acetaminophen (TYLENOL) tablet 650 mg, Q6H PRN   Or  acetaminophen (TYLENOL) suppository 650 mg, Q6H PRN  allopurinol (ZYLOPRIM) tablet 300 mg, Daily  atorvastatin (LIPITOR) tablet 40 mg, Daily  magnesium oxide (MAG-OX) tablet 400 mg, BID  miconazole (MICOTIN) 2 % powder, BID  therapeutic multivitamin-minerals 1 tablet, Daily  pantoprazole (PROTONIX) injection 40 mg, Daily   And  sodium chloride flush 0.9 % injection 10 mL, Daily  0.9 % sodium chloride infusion, PRN        Data:     Code Status:  Full Code    Family History   Problem Relation Age of Onset    Diabetes Mother     Heart Disease Mother     Heart Disease Father     Diabetes Sister     High Blood Pressure Sister     Diabetes Maternal Grandmother        Social History     Socioeconomic History    Marital status:      Spouse name: Not on file    Number of children: Not on file    Years of education: Not on file    Highest education level: Not on file   Occupational History    Not on file   Tobacco Use    Smoking status: Former Smoker     Packs/day: 0.25     Years: 10.00     Pack years: 2.50     Quit date: 2000     Years since quittin.2    Smokeless tobacco: Never Used   Vaping Use    Vaping Use: Never used   Substance and Sexual Activity    Alcohol use: Not Currently     Alcohol/week: 0.0 standard drinks    Drug use: No    Sexual activity: Not on file   Other Topics Concern    Not on file   Social History Narrative    Not on file     Social Determinants of Health     Financial Resource Strain: Low Risk     Difficulty of Paying Living Expenses: Not very hard   Food Insecurity: No Food Insecurity    Worried About Running Out of Food in the Last Year: Never true    Holden of Food in the Last Year: Never true   Transportation Needs:     Lack of Transportation (Medical): Not on file    Lack of Transportation (Non-Medical):  Not on file   Physical Activity:     Days of Exercise per Week: Not on file    Minutes of Exercise per Session: Not on file   Stress:     Feeling of Stress : Not on file   Social Connections:     Frequency of Communication with Friends and Family: Not on file    Frequency of Social Gatherings with Friends and Family: Not on file    Attends Mandaeism Services: Not on file    Active Member of [address] [place] [address] or Organizations: Not on file    Attends Club or Organization Meetings: Not on file    Marital Status: Not on file   Intimate Partner Violence:     Fear of Current or Ex-Partner: Not on file    Emotionally Abused: Not on file    Physically Abused: Not on file    Sexually Abused: Not on file   Housing Stability:     Unable to Pay for Housing in the Last Year: Not on file    Number of [place] in the Last Year: Not on file    Unstable Housing in the Last Year: Not on file       Vitals:  BP (!) 124/48   Pulse 75   Temp 97.4 °F (36.3 °C) (Axillary)   Resp 20   Ht 4' 11\" (1.499 m)   Wt 154 lb 5.2 oz (70 kg)   SpO2 99%   BMI 31.17 kg/m²   Temp (24hrs), Av.5 °F (36.4 °C), Min:97.4 °F (36.3 °C), Max:97.7 °F (36.5 °C)    Recent Labs     22  1614 03/10/22  1930 22  0732 22  1139   POCGLU 96 161* 94 97       I/O (24Hr):     Intake/Output Summary (Last 24 hours) at 3/11/2022 1504  Last data filed at 3/11/2022 0951  Gross per 24 hour   Intake 220 ml   Output 1050 ml   Net -830 ml       Labs:      CBC:   Lab Results   Component Value Date    WBC 6.4 2022    RBC 2.70 2022    HGB 8.2 2022    HCT 25.2 2022    MCV 93.5 2022    MCH 30.6 2022    MCHC 32.7 2022    RDW 18.8 2022     2022    MPV 8.5 2022     CBC with Differential:    Lab Results   Component Value Date    WBC 6.4 2022    RBC 2.70 2022    HGB 8.2 2022    HCT 25.2 2022     2022    MCV 93.5 2022    MCH 30.6 2022    MCHC 32.7 2022    RDW 18.8 2022    NRBC 4 2022    LYMPHOPCT 8 2022 MONOPCT 16 03/09/2022    BASOPCT 1 03/09/2022    MONOSABS 1.30 03/09/2022    LYMPHSABS 0.70 03/09/2022    EOSABS 0.10 03/09/2022    BASOSABS 0.10 03/09/2022    DIFFTYPE NOT REPORTED 02/06/2022     Hemoglobin/Hematocrit:    Lab Results   Component Value Date    HGB 8.2 03/11/2022    HCT 25.2 03/11/2022     CMP:    Lab Results   Component Value Date     03/11/2022    K 4.1 03/11/2022     03/11/2022    CO2 30 03/11/2022    BUN 62 03/11/2022    CREATININE 1.49 03/11/2022    GFRAA 42 03/11/2022    LABGLOM 34 03/11/2022    GLUCOSE 116 03/11/2022    PROT 4.7 03/10/2022    LABALBU 2.3 03/05/2022    CALCIUM 8.0 03/11/2022    BILITOT 0.29 03/05/2022    ALKPHOS 184 03/05/2022    AST 40 03/05/2022    ALT 25 03/05/2022     BMP:    Lab Results   Component Value Date     03/11/2022    K 4.1 03/11/2022     03/11/2022    CO2 30 03/11/2022    BUN 62 03/11/2022    LABALBU 2.3 03/05/2022    CREATININE 1.49 03/11/2022    CALCIUM 8.0 03/11/2022    GFRAA 42 03/11/2022    LABGLOM 34 03/11/2022    GLUCOSE 116 03/11/2022     PT/INR:    Lab Results   Component Value Date    PROTIME 16.0 02/06/2022    INR 1.3 02/06/2022     PTT:    Lab Results   Component Value Date    APTT 34.9 02/06/2022   [APTT}    Physical Examination:        General appearance: alert, cooperative and no distress  Mental Status: oriented to person, place and time and normal affect  Abdomen: soft, nontender, nondistended, bowel sounds present all four quadrants, no masses, hepatomegaly or splenomegaly  Extremities: no edema, redness or tenderness in the calves  Skin: no gross lesions, rashes, or induration    Assessment:        Primary Problem  Acute GI bleeding     Active Hospital Problems    Diagnosis Date Noted    [facility] malnutrition ([facility] [E44.1] 03/08/2022    Symptomatic anemia [D64.9]     Family history of colon cancer [Z80.0]     Family history of pancreatic cancer [Z80.0]     Elevated LFTs [R79.89] 02/25/2021    Paroxysmal atrial fibrillation ([facility] [I48.0] 07/28/2020    Stage 3 chronic kidney disease ([facility] [N18.30]      Past Medical History:   Diagnosis Date    Acute CVA (cerebrovascular accident) ([facility] 7/25/2020    Altered mental status 5/26/2021    Arthritis     Brain mass     Cellulitis and abscess     Cellulitis and abscess of unspecified site     Cellulitis of both lower extremities 5/26/2021    Cellulitis of left lower extremity 7/26/2020    Cellulitis of lower extremity 10/4/2020    CHF (congestive heart failure) (HCC)     Chronic kidney disease, unspecified     Coronary atherosclerosis of native coronary artery     Essential hypertension 7/25/2020    Essential hypertension, malignant     Hallucinations 2/18/2021    Hard of hearing     Head contusion 9/9/2020    Hypokalemia 9/9/2020    Iron deficiency anemia, unspecified     Meningioma ([facility] 2/25/2021    Myocardial infarction ([facility]     Other and unspecified hyperlipidemia     Pure hypercholesterolemia     Toxic metabolic encephalopathy 3/69/4380    Type II or unspecified type diabetes mellitus without mention of complication, not stated as uncontrolled     Unspecified asthma(493.90)     Unspecified venous (peripheral) insufficiency     Unspecified vitamin D deficiency     UTI (urinary tract infection) 2/18/2021        Plan:        1. BRIGIDO  1. No overt bleeding  2. Hgb 8.2  3. FOBT + on 3/1/22  4. Recently extubated  5. Continues to require O2  6. Cardiology requesting GI evaluation for AC  7. Has some wheezing, mild shortness of breath at rest, productive cough  8.  Will discuss with MD    Explained to the patient and d/W Nursing Staff  Will F/U with you  Please call or Page for any issues or change in status  Thanks    Electronically signed by MAL Foster NP on 3/11/2022 at 3:04 PM

## 2022-03-11 NOTE — PROGRESS NOTES
SHERITA sethi served Makayla NP w/ GI to see if it would be ok for Cardiology to start long term anticoagulation.  Awaiting reply

## 2022-03-11 NOTE — PROGRESS NOTES
Patient was seen and examined by me. Her clinical condition has improved and she is less dyspnea. She had right-sided thoracentesis aeration to her right lung is improved. She was alert and responsive bilateral air entry with decreased breath sounds at the right base and apical heart rate is 68  Her abdomen was soft and nontender with no significant leg edema. She does have bilateral ankle swelling. Her multiple problems are well detailed in my previous note. Please refer to notes by the resident as well. She had anemia secondary to GI bleed heart failure with preserved ejection fraction CKD and JOY. He also has anemia with right-sided pleural effusion being treated for pneumonia.

## 2022-03-11 NOTE — CARE COORDINATION
ONGOING DISCHARGE PLAN:    Patient is currently asleep. Spoke with patient's Daughter Shahrzad Gold, regarding discharge plan and she confirms that plan is still to return to home w/ Son & Daughter. Shahrzad Gold, states \"she checks on her 3/4 X a week\". Pt. Has no Insurance coverage for SNF. LSW did speak to Daughter today. Remains on IV Bumex/Merem. CXR jojo. Nephro following. Cr 1.49, Bun, 62. PT/OT. Will continue to follow for additional discharge needs.     Electronically signed by Kip Land RN on 3/11/2022 at 2:13 PM

## 2022-03-11 NOTE — PLAN OF CARE
TRANSFER OF CARE NOTE    Mitch Bañuelos a 69-year-old female with past medical history of CKD Stage III, asthma, impaired hearing, COPD (has never been on home O2), asthma (no exacerbations in past 10 years), HTN, CAD s/p stent placed 20 years ago (on Plavix), CVA, chronic A. Bertha Gills II diabetes mellitus, gout, sundowning syndrome, and anxiety who presented on 3/2 complaining of weakness, shortness of breath, and fatigue (onset 2/28).   She was found to have hemoglobin 4.4 on admission and was admitted for symptomatic anemia due to suspected acute GI bleeding. 1 unit PRBC transfused on 3/2. Iron sucrose (Venofer) 200 mg IV daily x5 doses completed on 3/6. Subsequently developed delirium with decreased resp status requiring intubation & transfer to ICU on 3/7.   Extubated 3/9. Mentation back at baseline, respiration improved. Transferred out of ICU on 3/10. Plan:  Hgb stable; Plavix d/c'ed, continue holding Eliquis,   Wean O2 off,  Today is day 5 of Meropenem,  C/w Bumex, BB, statin    Care is being transferred from resident team to private service on 3/11.     Electronically signed by Dedrick Epps DO on 3/11/2022 at 7:43 AM

## 2022-03-11 NOTE — PROGRESS NOTES
Dr. Redd Carvajal MD / Dr. Jesus Olivier MD / Dr. Marisela Maharaj CNP                  Pulmonary Consult Note   [facility]      Patient - Krista Ricci,  Age - 66 y.o.    - 1950      Room Number - 2123/2123-01   N -  [id number]   Acct # - [de-identified]  Date of Admission -  3/1/2022  9:47 PM        Lorenzo Desouza MD  Primary Care Physician - Angélica Hall MD     HPI   Patient had initially presented with hypercapnic respiratory failure. However multiple BG since then have shown a normal CO2. Patient did have thoracentesis performed yesterday pleural fluid was transudative in nature with this patient at the same time where nephrology was seeing her as well. Patient denies any chest pain. No shortness of breath. She is awake and alert. OBJECTIVE   VITALS    height is 4' 11\" (1.499 m) and weight is 154 lb 5.2 oz (70 kg). Her oral temperature is 97.7 °F (36.5 °C). Her blood pressure is 123/48 (abnormal) and her pulse is 79. Her respiration is 20 and oxygen saturation is 99%. Body mass index is 31.17 kg/m².   Temperature Range: Temp: 97.7 °F (36.5 °C) Temp  Av.6 °F (36.4 °C)  Min: 97.4 °F (36.3 °C)  Max: 97.9 °F (36.6 °C)  BP Range:  Systolic (95YMI), NPD:370 , Min:116 , YLL:212     Diastolic (70LCQ), FOU:58, Min:36, Max:76    Pulse Range: Pulse  Av.9  Min: 75  Max: 88  Respiration Range: Resp  Av.2  Min: 13  Max: 31  Current Pulse Ox[de-identified]  SpO2: 99 %  24HR Pulse Ox Range:  SpO2  Av.7 %  Min: 93 %  Max: 99 %  Oxygen Amount and Delivery: O2 Flow Rate (L/min): 2 L/min    Wt Readings from Last 3 Encounters:   22 154 lb 5.2 oz (70 kg)   22 156 lb 1.4 oz (70.8 kg)   22 143 lb 6.4 oz (65 kg)       I/O (24 Hours)    Intake/Output Summary (Last 24 hours) at 3/11/2022 1101  Last data filed at 3/11/2022 0951  Gross per 24 hour   Intake 420 ml   Output 1350 ml   Net -930 ml       EXAM     General Appearance  Awake, alert, oriented, in no acute distress  HEENT - normocephalic, atraumatic   Neck - Supple,  trachea midline   Lungs -nonlabored respirations.   Lung sounds do have fine crackles posteriorly  Cardiovascular - Heart sounds are normal.  Regular rate and rhythm   Abdomen - Soft, nontender, nondistended, no masses or organomegaly  Neurologic - There are no focal motor or sensory deficits  Skin - No bruising or bleeding      MEDS      metoprolol tartrate  50 mg Oral BID    bumetanide  2 mg IntraVENous TID    meropenem  1,000 mg IntraVENous Q12H    ipratropium-albuterol  1 ampule Inhalation Q4H    sodium chloride flush  5-40 mL IntraVENous 2 times per day    allopurinol  300 mg Oral Daily    atorvastatin  40 mg Oral Daily    magnesium oxide  400 mg Oral BID    miconazole   Topical BID    therapeutic multivitamin-minerals  1 tablet Oral Daily    pantoprazole  40 mg IntraVENous Daily    And    sodium chloride flush  10 mL IntraVENous Daily      sodium chloride 25 mL (03/10/22 2150)    sodium chloride       sodium chloride flush, sodium chloride, ondansetron **OR** ondansetron, magnesium hydroxide, acetaminophen **OR** acetaminophen, sodium chloride    LABS   CBC   Recent Labs     03/11/22  0433   WBC 6.4   HGB 8.2*   HCT 25.2*   MCV 93.5        BMP:   Lab Results   Component Value Date     03/11/2022    K 4.1 03/11/2022     03/11/2022    CO2 30 03/11/2022    BUN 62 03/11/2022    LABALBU 2.3 03/05/2022    CREATININE 1.49 03/11/2022    CALCIUM 8.0 03/11/2022    GFRAA 42 03/11/2022    LABGLOM 34 03/11/2022     ABGs:  Lab Results   Component Value Date    PHART 7.459 03/09/2022    PO2ART 107.0 03/09/2022    BTD8HYS 39.9 03/09/2022      Lab Results   Component Value Date    MODE PRVC 03/09/2022     Ionized Calcium:  No results found for: IONCA  Magnesium:    Lab Results   Component Value Date    MG 2.0 03/11/2022     Phosphorus:    Lab Results   Component Value Date    PHOS 3.7 03/08/2022        LIVER PROFILE No results for input(s): AST, ALT, LIPASE, BILIDIR, BILITOT, ALKPHOS in the last 72 hours. Invalid input(s): AMYLASE,  ALB  INR No results for input(s): INR in the last 72 hours. PTT   Lab Results   Component Value Date    APTT 34.9 02/06/2022         RADIOLOGY     (See actual reports for details)    ASSESSMENT/PLAN       1. Acute hypercapnic respiratory failure-improved  2. Altered mental status requiring intubation on 3/7/2022 extubated 3/9  3. Hypoxia-not normally on home O2  4. Acute on chronic diastolic CHF-Dr Melton,   5. Bilateral pleural effusions status post thoracentesis on the right 800 mL out 3/10/2022  6. CAD-history of CABG [facility]  7. Mild AS  8. Mild to mod AR  9. COPD?  Uses inhaler, no pulmonologist, no PFT, smoked for 30+ years quitting in 2000  10. CKDz III  11. [place]  12. A fib-eliquis held for anemia  13. DM2  14. AZIZA? Never tested but does snore and nap  15. Anemia-GI source eval underway, Hgb [place] 4.4 s/p transfusion, now HgB 8.4, no active signs of bleeding now  16.  Full code           Plan:    Repeat chest x-ray tomorrow  Nephrology is managing diuresing-IV Bumex 2 mg 3 times daily  Wean O2 as tolerated  Increase activity  Spoke to patient's daughter in the room    Electronically signed by MAL Omer CNP on 3/11/2022 at 11:01 AM

## 2022-03-12 ENCOUNTER — APPOINTMENT (OUTPATIENT)
Dept: GENERAL RADIOLOGY | Age: 72
DRG: 377 | End: 2022-03-12
Payer: OTHER GOVERNMENT

## 2022-03-12 LAB
ANION GAP SERPL CALCULATED.3IONS-SCNC: 8 MMOL/L (ref 9–17)
BUN BLDV-MCNC: 65 MG/DL (ref 8–23)
CALCIUM SERPL-MCNC: 8.2 MG/DL (ref 8.6–10.4)
CHLORIDE BLD-SCNC: 98 MMOL/L (ref 98–107)
CO2: 33 MMOL/L (ref 20–31)
CREAT SERPL-MCNC: 1.35 MG/DL (ref 0.5–0.9)
GFR AFRICAN AMERICAN: 47 ML/MIN
GFR NON-AFRICAN AMERICAN: 39 ML/MIN
GFR SERPL CREATININE-BSD FRML MDRD: ABNORMAL ML/MIN/{1.73_M2}
GLUCOSE BLD-MCNC: 122 MG/DL (ref 65–105)
GLUCOSE BLD-MCNC: 128 MG/DL (ref 65–105)
GLUCOSE BLD-MCNC: 189 MG/DL (ref 65–105)
GLUCOSE BLD-MCNC: 99 MG/DL (ref 70–99)
HCT VFR BLD CALC: 26.7 % (ref 36–46)
HEMOGLOBIN: 8.9 G/DL (ref 12–16)
MAGNESIUM: 2 MG/DL (ref 1.6–2.6)
MCH RBC QN AUTO: 30.7 PG (ref 26–34)
MCHC RBC AUTO-ENTMCNC: 33.3 G/DL (ref 31–37)
MCV RBC AUTO: 92.2 FL (ref 80–100)
PDW BLD-RTO: 18.2 % (ref 11.5–14.9)
PLATELET # BLD: 247 K/UL (ref 150–450)
PMV BLD AUTO: 8 FL (ref 6–12)
POTASSIUM SERPL-SCNC: 4.3 MMOL/L (ref 3.7–5.3)
RBC # BLD: 2.89 M/UL (ref 4–5.2)
SODIUM BLD-SCNC: 139 MMOL/L (ref 135–144)
WBC # BLD: 5.5 K/UL (ref 3.5–11)

## 2022-03-12 PROCEDURE — 94761 N-INVAS EAR/PLS OXIMETRY MLT: CPT

## 2022-03-12 PROCEDURE — 99232 SBSQ HOSP IP/OBS MODERATE 35: CPT | Performed by: INTERNAL MEDICINE

## 2022-03-12 PROCEDURE — 6370000000 HC RX 637 (ALT 250 FOR IP)

## 2022-03-12 PROCEDURE — 80048 BASIC METABOLIC PNL TOTAL CA: CPT

## 2022-03-12 PROCEDURE — APPSS30 APP SPLIT SHARED TIME 16-30 MINUTES: Performed by: NURSE PRACTITIONER

## 2022-03-12 PROCEDURE — 6370000000 HC RX 637 (ALT 250 FOR IP): Performed by: INTERNAL MEDICINE

## 2022-03-12 PROCEDURE — 2580000003 HC RX 258: Performed by: INTERNAL MEDICINE

## 2022-03-12 PROCEDURE — 2060000000 HC ICU INTERMEDIATE R&B

## 2022-03-12 PROCEDURE — 6360000002 HC RX W HCPCS: Performed by: INTERNAL MEDICINE

## 2022-03-12 PROCEDURE — 2500000003 HC RX 250 WO HCPCS: Performed by: INTERNAL MEDICINE

## 2022-03-12 PROCEDURE — 85027 COMPLETE CBC AUTOMATED: CPT

## 2022-03-12 PROCEDURE — C9113 INJ PANTOPRAZOLE SODIUM, VIA: HCPCS | Performed by: INTERNAL MEDICINE

## 2022-03-12 PROCEDURE — 94640 AIRWAY INHALATION TREATMENT: CPT

## 2022-03-12 PROCEDURE — 82947 ASSAY GLUCOSE BLOOD QUANT: CPT

## 2022-03-12 PROCEDURE — 36415 COLL VENOUS BLD VENIPUNCTURE: CPT

## 2022-03-12 PROCEDURE — 71045 X-RAY EXAM CHEST 1 VIEW: CPT

## 2022-03-12 PROCEDURE — 83735 ASSAY OF MAGNESIUM: CPT

## 2022-03-12 RX ORDER — METOLAZONE 2.5 MG/1
5 TABLET ORAL DAILY
Status: COMPLETED | OUTPATIENT
Start: 2022-03-12 | End: 2022-03-14

## 2022-03-12 RX ORDER — AMIODARONE HYDROCHLORIDE 200 MG/1
200 TABLET ORAL DAILY
Status: DISCONTINUED | OUTPATIENT
Start: 2022-03-12 | End: 2022-03-17 | Stop reason: HOSPADM

## 2022-03-12 RX ORDER — BENZONATATE 200 MG/1
200 CAPSULE ORAL 3 TIMES DAILY PRN
Status: DISCONTINUED | OUTPATIENT
Start: 2022-03-12 | End: 2022-03-17 | Stop reason: HOSPADM

## 2022-03-12 RX ORDER — DEXTROMETHORPHAN POLISTIREX 30 MG/5ML
60 SUSPENSION ORAL EVERY 12 HOURS PRN
Status: DISCONTINUED | OUTPATIENT
Start: 2022-03-12 | End: 2022-03-17 | Stop reason: HOSPADM

## 2022-03-12 RX ADMIN — IPRATROPIUM BROMIDE AND ALBUTEROL SULFATE 1 AMPULE: 2.5; .5 SOLUTION RESPIRATORY (INHALATION) at 16:01

## 2022-03-12 RX ADMIN — Medication 60 MG: at 16:03

## 2022-03-12 RX ADMIN — METOLAZONE 5 MG: 2.5 TABLET ORAL at 15:41

## 2022-03-12 RX ADMIN — ACETAMINOPHEN 650 MG: 325 TABLET, FILM COATED ORAL at 20:29

## 2022-03-12 RX ADMIN — ATORVASTATIN CALCIUM 40 MG: 40 TABLET, FILM COATED ORAL at 09:31

## 2022-03-12 RX ADMIN — BUMETANIDE 2 MG: 0.25 INJECTION, SOLUTION INTRAMUSCULAR; INTRAVENOUS at 15:41

## 2022-03-12 RX ADMIN — AMIODARONE HYDROCHLORIDE 200 MG: 200 TABLET ORAL at 12:50

## 2022-03-12 RX ADMIN — MAGNESIUM OXIDE TAB 400 MG (241.3 MG ELEMENTAL MG) 400 MG: 400 (241.3 MG) TAB at 09:31

## 2022-03-12 RX ADMIN — PANTOPRAZOLE SODIUM 40 MG: 40 INJECTION, POWDER, FOR SOLUTION INTRAVENOUS at 09:31

## 2022-03-12 RX ADMIN — IPRATROPIUM BROMIDE AND ALBUTEROL SULFATE 1 AMPULE: 2.5; .5 SOLUTION RESPIRATORY (INHALATION) at 22:45

## 2022-03-12 RX ADMIN — SODIUM CHLORIDE, PRESERVATIVE FREE 10 ML: 5 INJECTION INTRAVENOUS at 09:31

## 2022-03-12 RX ADMIN — BUMETANIDE 2 MG: 0.25 INJECTION, SOLUTION INTRAMUSCULAR; INTRAVENOUS at 09:31

## 2022-03-12 RX ADMIN — MEROPENEM 1000 MG: 1 INJECTION, POWDER, FOR SOLUTION INTRAVENOUS at 10:51

## 2022-03-12 RX ADMIN — ALLOPURINOL 300 MG: 300 TABLET ORAL at 09:31

## 2022-03-12 RX ADMIN — METOPROLOL TARTRATE 50 MG: 50 TABLET, FILM COATED ORAL at 09:31

## 2022-03-12 RX ADMIN — SODIUM CHLORIDE 25 ML: 9 INJECTION, SOLUTION INTRAVENOUS at 10:48

## 2022-03-12 RX ADMIN — BUMETANIDE 2 MG: 0.25 INJECTION, SOLUTION INTRAMUSCULAR; INTRAVENOUS at 20:30

## 2022-03-12 RX ADMIN — MEROPENEM 1000 MG: 1 INJECTION, POWDER, FOR SOLUTION INTRAVENOUS at 22:21

## 2022-03-12 RX ADMIN — SODIUM CHLORIDE, PRESERVATIVE FREE 10 ML: 5 INJECTION INTRAVENOUS at 20:30

## 2022-03-12 RX ADMIN — ACETAMINOPHEN 650 MG: 325 TABLET, FILM COATED ORAL at 10:30

## 2022-03-12 RX ADMIN — ANTI-FUNGAL POWDER MICONAZOLE NITRATE TALC FREE: 1.42 POWDER TOPICAL at 09:31

## 2022-03-12 RX ADMIN — ACETAMINOPHEN 650 MG: 325 TABLET, FILM COATED ORAL at 04:11

## 2022-03-12 RX ADMIN — SODIUM CHLORIDE, PRESERVATIVE FREE 10 ML: 5 INJECTION INTRAVENOUS at 09:37

## 2022-03-12 RX ADMIN — METOPROLOL TARTRATE 50 MG: 50 TABLET, FILM COATED ORAL at 20:30

## 2022-03-12 RX ADMIN — IPRATROPIUM BROMIDE AND ALBUTEROL SULFATE 1 AMPULE: 2.5; .5 SOLUTION RESPIRATORY (INHALATION) at 19:14

## 2022-03-12 RX ADMIN — IPRATROPIUM BROMIDE AND ALBUTEROL SULFATE 1 AMPULE: 2.5; .5 SOLUTION RESPIRATORY (INHALATION) at 11:09

## 2022-03-12 RX ADMIN — IPRATROPIUM BROMIDE AND ALBUTEROL SULFATE 1 AMPULE: 2.5; .5 SOLUTION RESPIRATORY (INHALATION) at 03:17

## 2022-03-12 RX ADMIN — ANTI-FUNGAL POWDER MICONAZOLE NITRATE TALC FREE: 1.42 POWDER TOPICAL at 20:44

## 2022-03-12 RX ADMIN — MAGNESIUM OXIDE TAB 400 MG (241.3 MG ELEMENTAL MG) 400 MG: 400 (241.3 MG) TAB at 20:30

## 2022-03-12 RX ADMIN — MULTIPLE VITAMINS W/ MINERALS TAB 1 TABLET: TAB at 09:31

## 2022-03-12 RX ADMIN — IPRATROPIUM BROMIDE AND ALBUTEROL SULFATE 1 AMPULE: 2.5; .5 SOLUTION RESPIRATORY (INHALATION) at 07:43

## 2022-03-12 ASSESSMENT — PAIN DESCRIPTION - FREQUENCY: FREQUENCY: INTERMITTENT

## 2022-03-12 ASSESSMENT — PAIN DESCRIPTION - PAIN TYPE: TYPE: CHRONIC PAIN

## 2022-03-12 ASSESSMENT — PAIN SCALES - GENERAL
PAINLEVEL_OUTOF10: 3
PAINLEVEL_OUTOF10: 0
PAINLEVEL_OUTOF10: 3
PAINLEVEL_OUTOF10: 0
PAINLEVEL_OUTOF10: 0
PAINLEVEL_OUTOF10: 4
PAINLEVEL_OUTOF10: 3

## 2022-03-12 ASSESSMENT — PAIN DESCRIPTION - LOCATION: LOCATION: GENERALIZED

## 2022-03-12 ASSESSMENT — PAIN DESCRIPTION - DESCRIPTORS: DESCRIPTORS: ACHING

## 2022-03-12 NOTE — PROGRESS NOTES
[facility]    Gastroenterology Daily Progress Note      Patient:   Meet Butler   :       Facility:   [facility]  Date:     3/12/2022  Consultant:   MAL Lee CNP, CNP      SUBJECTIVE  66 y.o. female admitted 3/1/2022 with Acute GI bleeding [K92.2]  Symptomatic anemia [D64.9] and seen for iron deficiency anemia, fobt positive. The pt was seen and examined. hgb 8.9 no c/o abdominal pain or nausea.          OBJECTIVE  Scheduled Meds:   amiodarone  200 mg Oral Daily    metoprolol tartrate  50 mg Oral BID    bumetanide  2 mg IntraVENous TID    meropenem  1,000 mg IntraVENous Q12H    ipratropium-albuterol  1 ampule Inhalation Q4H    sodium chloride flush  5-40 mL IntraVENous 2 times per day    allopurinol  300 mg Oral Daily    atorvastatin  40 mg Oral Daily    magnesium oxide  400 mg Oral BID    miconazole   Topical BID    therapeutic multivitamin-minerals  1 tablet Oral Daily    pantoprazole  40 mg IntraVENous Daily    And    sodium chloride flush  10 mL IntraVENous Daily       Vital Signs:  BP (!) 145/53   Pulse 88   Temp 97.6 °F (36.4 °C) (Oral)   Resp 18   Ht 4' 11\" (1.499 m)   Wt 152 lb 8.9 oz (69.2 kg)   SpO2 97%   BMI 30.81 kg/m²      Physical Exam:   General Appearance: alert and oriented to person, place and time, well-developed and well-nourished, in no acute distress  Skin: warm and dry, no rash or erythema  Head: normocephalic and atraumatic  Eyes: pupils equal, round, and reactive to light, extraocular eye movements intact, conjunctivae normal  ENT: hearing grossly normal bilaterally  Neck: neck supple and non tender without mass, no thyromegaly or thyroid nodules, no cervical lymphadenopathy   Pulmonary/Chest: clear to auscultation bilaterally- no wheezes, rales or rhonchi, normal air movement, no respiratory distress  Cardiovascular: normal rate, regular rhythm, normal S1 and S2, no murmurs, rubs, clicks or gallops, distal pulses intact, no carotid bruits  Abdomen: soft, non-tender, non-distended, normal bowel sounds, no masses or organomegaly  Extremities: no cyanosis, clubbing or edema  Musculoskeletal: normal range of motion, no joint swelling, deformity or tenderness  Neurologic: no cranial nerve deficit and muscle strength normal    Lab and Imaging Review     CBC  Recent Labs     03/10/22  0435 03/11/22  0433 03/12/22  0525   WBC 8.2 6.4 5.5   HGB 8.0* 8.2* 8.9*   HCT 24.7* 25.2* 26.7*   MCV 93.5 93.5 92.2    253 247       BMP  Recent Labs     03/10/22  0435 03/11/22  0433 03/12/22  0525    138 139   K 4.1 4.1 4.3    100 98   CO2 28 30 33*   BUN 60* 62* 65*   CREATININE 1.48* 1.49* 1.35*   GLUCOSE 108* 116* 99   CALCIUM 8.2* 8.0* 8.2*       LFTS  Recent Labs     03/10/22  1150   PROT 4.7*       US LIVER     Result Date: 3/2/2022  EXAMINATION: RIGHT UPPER QUADRANT ULTRASOUND 3/2/2022 1:45 pm  FINDINGS: LIVER:  Diffuse increased liver parenchymal echogenicity is nonspecific but can be seen in patients with hepatic steatosis or diffuse hepatocellular process. .  No focal liver lesion.  No intrahepatic biliary ductal dilatation. The portal vein is patent and demonstrates biphasic flow which can be seen in patients with tricuspid regurgitation. BILIARY SYSTEM:  Gallbladder is unremarkable without evidence of pericholecystic fluid, wall thickening or stones.  Negative sonographic Graf's sign. Common bile duct is within normal limits measuring 4 mm. RIGHT KIDNEY: Diffuse increased renal parenchymal echogenicity suggestive chronic medical renal.  No right hydronephrosis PANCREAS:  Visualized portions of the pancreas are unremarkable. OTHER: No evidence of right upper quadrant ascites.      Diffuse increased heterogeneous liver parenchymal echogenicity could be seen in patients with hepatic steatosis or diffuse hepatocellular process. Patent portal vein with biphasic flow can be seen in patient with tricuspid regurgitation. RECOMMENDATIONS: Unavailable        ASSESSMENT/plan  1. Iron deficiency anemia  -not cleared for egd and colonoscopy  -iron replacement  -ppi  -ok for AC from a gi standpoint      2. Elevated lft's  Us showing possible steatosis vs hepatocellular process  -alk isoenzyme shows elevation in bone    3. JOY    4.decompensated heart failure    5.acute respiratory failure    This plan was formulated in collaboration with Dr. Eula Rooney  .     Electronically signed by: MAL Sanchez CNP on 3/12/2022 at 2:21 PM

## 2022-03-12 NOTE — PLAN OF CARE
Problem: Skin Integrity:  Goal: Will show no infection signs and symptoms  Description: Will show no infection signs and symptoms  3/12/2022 0221 by Kris Hannah RN  Outcome: Ongoing     Problem: Falls - Risk of:  Goal: Will remain free from falls  Description: Will remain free from falls  3/12/2022 0221 by Kris Hannah RN  Outcome: Ongoing     Problem: Nutrition  Goal: Optimal nutrition therapy  3/12/2022 0221 by Kris Hannah RN  Outcome: Ongoing     Problem: Pain:  Goal: Pain level will decrease  Description: Pain level will decrease  3/12/2022 0221 by Kris Hannah RN  Outcome: Ongoing     Problem: OXYGENATION/RESPIRATORY FUNCTION  Goal: Patient will maintain patent airway  3/12/2022 0221 by Kris Hannah RN  Outcome: Ongoing

## 2022-03-12 NOTE — PROGRESS NOTES
Updated Dr. Rashel Dejesus of patient orders from Dr. Kahlil Naranjo about amiodarone and ok for echo to be done on Monday. Also, requested ac and HS order for blood glucose- patient is a known diabetic.

## 2022-03-12 NOTE — PROGRESS NOTES
Paged Dr. Escobar Spears about amiodarone- see cardiology note, as well as echo not being able to be done til Monday. Awaiting call back.

## 2022-03-12 NOTE — PROGRESS NOTES
[place] [address] Internal Medicine    Progress Note    3/12/2022    11:39 AM    Name:   Emily Carrera  MRN:     [id number]     Acct:      [de-identified]   Room:   [facility]  IP Day:  10  Admit Date:  3/1/2022  9:47 PM    PCP:   Henry Urias MD  Code Status:  Full Code    Subjective:     C/C:   Chief Complaint   Patient presents with    Fatigue         HPI:     See HPI    Review of Systems:   Positive for shortness of breath no new cough  Denies chest pain or palpitations  Denies abdominal pain, diarrhea vomiting  Denies any new numbness tremors or weakness. Medications: Allergies:     Allergies   Allergen Reactions    Latex Rash    Aleve [Naproxen Sodium]      Chronic kidney disease stage III, CHF    Pioglitazone Other (See Comments)     Congestive heart failure    Claritin [Loratadine]     Keflex [Cephalexin]     Lisinopril      Needs clarification of contraindication       Current Meds:   Scheduled Meds:    metoprolol tartrate  50 mg Oral BID    bumetanide  2 mg IntraVENous TID    meropenem  1,000 mg IntraVENous Q12H    ipratropium-albuterol  1 ampule Inhalation Q4H    sodium chloride flush  5-40 mL IntraVENous 2 times per day    allopurinol  300 mg Oral Daily    atorvastatin  40 mg Oral Daily    magnesium oxide  400 mg Oral BID    miconazole   Topical BID    therapeutic multivitamin-minerals  1 tablet Oral Daily    pantoprazole  40 mg IntraVENous Daily    And    sodium chloride flush  10 mL IntraVENous Daily     Continuous Infusions:    sodium chloride 25 mL (03/12/22 1048)    sodium chloride       PRN Meds: benzonatate, dextromethorphan, sodium chloride flush, sodium chloride, ondansetron **OR** ondansetron, magnesium hydroxide, acetaminophen **OR** acetaminophen, sodium chloride    Data:     Past Medical History:   has a past medical history of Acute CVA (cerebrovascular accident) ([facility] [address] Altered mental status, Arthritis, Brain mass, Cellulitis and abscess, Cellulitis and abscess of unspecified site, Cellulitis of both lower extremities, Cellulitis of left lower extremity, Cellulitis of lower extremity, CHF (congestive heart failure) ([facility] [address] Chronic kidney disease, unspecified, Coronary atherosclerosis of native coronary artery, Essential hypertension, Essential hypertension, malignant, Hallucinations, Hard of hearing, Head contusion, Hypokalemia, Iron deficiency anemia, unspecified, Meningioma ([facility] [address] Myocardial infarction ([facility] [address] Other and unspecified hyperlipidemia, Pure hypercholesterolemia, Toxic metabolic encephalopathy, Type II or unspecified type diabetes mellitus without mention of complication, not stated as uncontrolled, Unspecified asthma(493.90), Unspecified venous (peripheral) insufficiency, Unspecified vitamin D deficiency, and UTI (urinary tract infection). Social History:   reports that she quit smoking about 22 years ago. She has a 2.50 pack-year smoking history. She has never used smokeless tobacco. She reports previous alcohol use. She reports that she does not use drugs. Family History:   Family History   Problem Relation Age of Onset    Diabetes Mother     Heart Disease Mother     Heart Disease Father     Diabetes Sister     High Blood Pressure Sister     Diabetes Maternal Grandmother        Vitals:  BP (!) 149/57   Pulse 89   Temp 98.9 °F (37.2 °C) (Oral)   Resp 18   Ht 4' 11\" (1.499 m)   Wt 152 lb 8.9 oz (69.2 kg)   SpO2 97%   BMI 30.81 kg/m²   Temp (24hrs), Av.6 °F (29.2 °C), Min:32 °F (0 °C), Max:98.9 °F (37.2 °C)    Recent Labs     22  0732 22  1139 22  1620 22   POCGLU 94 97 122* 149*       I/O (24Hr):     Intake/Output Summary (Last 24 hours) at 3/12/2022 1139  Last data filed at 3/12/2022 0937  Gross per 24 hour   Intake 380 ml   Output 1800 ml   Net -1420 ml       Labs:    Lab Results   Component Value Date    WBC 5.5 2022    HGB 8.9 (L) 2022    HCT 26.7 (L) 03/12/2022    MCV 92.2 03/12/2022     03/12/2022     Lab Results   Component Value Date     03/12/2022    K 4.3 03/12/2022    CL 98 03/12/2022    CO2 33 03/12/2022    BUN 65 03/12/2022    CREATININE 1.35 03/12/2022    GLUCOSE 99 03/12/2022    CALCIUM 8.2 03/12/2022          Lab Results   Component Value Date/Time    SPECIAL RIGHT 03/10/2022 11:00 AM     Lab Results   Component Value Date/Time    CULTURE NO GROWTH 2 DAYS 03/10/2022 11:00 AM         Radiology:    Recent data reviewed    Physical Examination:        General appearance:  alert, cooperative and no distress  Eyes: Anicteric sclera. Pupils are equally round and reactive to light. Extraocular movements are intact. Lungs:  B/l reduced air entry, bilateral crackles.    Heart:  regular rate and rhythm, no murmur  Abdomen:  soft, nontender, nondistended, normal bowel sounds, no masses, hepatomegaly, splenomegaly  Extremities:  no edema, redness, tenderness in the calves  Skin:  no gross lesions, rashes, induration  Neuro:  Alert, oriented X 3, no new focal weakness  Assessment:        Primary Problem  Acute GI bleeding    Active Hospital Problems    Diagnosis Date Noted    Mild malnutrition ([facility] [address] [E44.1] 03/08/2022    Symptomatic anemia [D64.9]     Family history of colon cancer [Z80.0]     Family history of pancreatic cancer [Z80.0]     Elevated LFTs [R79.89] 02/25/2021    Paroxysmal atrial fibrillation ([facility] [address] [I48.0] 07/28/2020    Stage 3 chronic kidney disease ([facility] [address] [N18.30]            DVT prophylaxis: Mechanical  Antibiotics: Day 5 of Merrem    Plan:        65-year-old lady with history of atrial fibrillation history of coronary disease stent placed 20 years ago on Eliquis and Plavix 75 baseline hemoglobin was 10 2 weeks ago admitted with gradual increase of fatigue dyspnea exertion sleepy most part of the day no fever no vomiting blood no blood in the stool no dark stools no diarrhea hemoglobin ER was 4.4 suspected GI bleed Eliquis and Plavix held n.p.o. for now upper and lower endoscopies GI consulted  IV Venofer 200 mg daily  1 unit of PRBC given hemoglobin improved to 6 will need another unit of PRBC  Ckd,nephro consult,pt was due to see dr Yesy Branham for ckd    Needs EGD but has been difficult to obtain due to delirium, CO2 narcosis, respiratory acidosis, CHF exacerbation  Cardiology nephrology pulmonology following    3/7  Was moved out from ICU last night  Did not wear BiPAP overnight, chest x-ray worsening with bilateral effusions-we will await blood recommendation regarding thoracentesis  ABG did not show hypoxia hypercapnia  Was also started on Seroquel yesterday for hallucination anxiety and insomnia-suspect residual sedation from this; will reevaluate later today  EGD canceled for today      3/11  Patient was extubated on 3/9, mental status back to baseline respiration improved, transferred to floor on 3/10  Underwent thoracentesis on the right on 3/ ml out  Continues to be diuresed with IV Bumex 2mg tid  Has bilateral crackes, cough- needs to continue iv diuresis  CXR tomorrow  EGD will be done outpatiet    3/12  Overall doing well, oxygen requirements reduced to 1.5 L by nasal cannula  Continues on IV Bumex diuresis  Creatinine improving  Hemoglobin better today at 8.9  Repeat echo ordered by cardiology and pending        Crista Paniagua MD  3/12/2022  11:39 AM

## 2022-03-12 NOTE — PROGRESS NOTES
Per Dr. Ana Cai, start Amiodarone 200 mg po qd today and ok to waiting until Monday for echocardiogram.

## 2022-03-12 NOTE — PLAN OF CARE
Problem: Skin Integrity:  Goal: Will show no infection signs and symptoms  Description: Will show no infection signs and symptoms  3/12/2022 1244 by Loni Mtz RN  Outcome: Ongoing   No new s/s of infection noted this shift. Will continue on iv atb this shift. Problem: Skin Integrity:  Goal: Absence of new skin breakdown  Description: Absence of new skin breakdown  3/12/2022 1244 by Loni Mtz RN  Outcome: Ongoing   No new skin breakdown noted this shift. Will continue to monitor. Problem: Falls - Risk of:  Goal: Will remain free from falls  Description: Will remain free from falls  3/12/2022 1244 by Loni Mtz RN  Outcome: Ongoing   No falls noted this shift. Problem: Falls - Risk of:  Goal: Absence of physical injury  Description: Absence of physical injury  3/12/2022 1244 by Loni Mtz RN  Outcome: Ongoing   No injury noted this shift. Problem: Nutrition  Goal: Optimal nutrition therapy  3/12/2022 1244 by Loni Mtz RN  Outcome: Ongoing   Pt encouraged po intake this shift. Problem: Pain:  Goal: Pain level will decrease  Description: Pain level will decrease  3/12/2022 1244 by Loni Mtz RN  Outcome: Ongoing   Pt rated pain- tylenol administered. Will continue to monitor. Problem: OXYGENATION/RESPIRATORY FUNCTION  Goal: Patient will maintain patent airway  3/12/2022 1244 by Loni Mtz RN  Outcome: Ongoing   Monitoring. Pt continues on oxygen at 1.5 LNC. Will continue to monitor.

## 2022-03-12 NOTE — PROGRESS NOTES
[facility]   Progress Note                   Date:   3/12/2022  Patient name: Donal Hoffman  Date of admission:  3/1/2022  9:47 PM  MRN:   [id number]  YOB: 1950  PCP: Ortega Garcia MD    Reason for Admission:  symptomatic anemia/GIB/Hypoxic reps failure     Subjective:       Denies any cp. No pnd. Eating breakfast.     Medications:   Scheduled Meds:   metoprolol tartrate  50 mg Oral BID    bumetanide  2 mg IntraVENous TID    meropenem  1,000 mg IntraVENous Q12H    ipratropium-albuterol  1 ampule Inhalation Q4H    sodium chloride flush  5-40 mL IntraVENous 2 times per day    allopurinol  300 mg Oral Daily    atorvastatin  40 mg Oral Daily    magnesium oxide  400 mg Oral BID    miconazole   Topical BID    therapeutic multivitamin-minerals  1 tablet Oral Daily    pantoprazole  40 mg IntraVENous Daily    And    sodium chloride flush  10 mL IntraVENous Daily       Continuous Infusions:   sodium chloride 25 mL (03/10/22 2150)    sodium chloride         CBC:   Recent Labs     03/10/22  0435 03/11/22  0433 03/12/22  0525   WBC 8.2 6.4 5.5   HGB 8.0* 8.2* 8.9*    253 247     BMP:    Recent Labs     03/10/22  0435 03/11/22  0433 03/12/22  0525    138 139   K 4.1 4.1 4.3    100 98   CO2 28 30 33*   BUN 60* 62* 65*   CREATININE 1.48* 1.49* 1.35*   GLUCOSE 108* 116* 99     Hepatic:   No results for input(s): AST, ALT, ALB, BILITOT, ALKPHOS in the last 72 hours. Troponin: No results for input(s): TROPONINI in the last 72 hours. BNP: No results for input(s): BNP in the last 72 hours. Lipids: No results for input(s): CHOL, HDL in the last 72 hours. Invalid input(s): LDLCALCU  INR: No results for input(s): INR in the last 72 hours.     Objective:   Vitals: BP (!) 149/57   Pulse 89   Temp 98.9 °F (37.2 °C) (Oral)   Resp 18   Ht 4' 11\" (1.499 m)   Wt 152 lb 8.9 oz (69.2 kg)   SpO2 97%   BMI 30.81 kg/m²   Constitutional and General Appearance: intubated sedated  HEENT: sedated  Respiratory:  · Intubated on vent  · Clear but diminished throughout. No rales  · No distress  Cardiovascular:  · Heart tones are crisp and normal. regular S1 and S2.  · Tele SR  · Peripheral pulses are symmetrical and full   Abdomen:   · Soft  · No masses or tenderness  Extremities:  ·  No Cyanosis or Clubbing  ·  Lower extremity edema: No  ·  Skin: Warm and dry  Neurological:  · Sedated    EKG 3/1/2022: NSR, LVH, Inferior infarct, NS ST T changes. Previous cardiac testing:   CATH 7/21/06:   Patent LIMA to LAD and SVG to RCA.  Occluded SVG to OM 2.  Occluded RCA.      CABG with LIMA-LAD, SVG-OM and SVG-RCA in 2003.     TTE/CV July 2020  Summary:   1. A KARLA was performed without complications. 2. Normal LV size with normal LVEF. 3. No thrombus or valvular vegetation identified  4. Moderate atheromatous disease noted in aortic arch     CARDIOVERSION:  After an adequate level of sedation was achieved, 200J in biphasic synchronized delivery was administered. conversion to normal sinus rhythm. The patient awoke without complications     Echo 6/9/78  Summary  Normal left ventricle size and function with an estimated EF > 55%. No segmental wall motion abnormalities seen. Moderate left ventricular hypertrophy. Normal right ventricular size and function. Left atrial dilatation. Aortic valve is trileaflet. Mild aortic stenosis. Mild-moderate aortic insufficiency. Normal aortic root dimension. Mitral annular calcification is seen. Mild mitral regurgitation. Mild tricuspid regurgitation. Normal right ventricular systolic pressure. No significant pericardial effusion is seen. Pleural effusion present. Assessment & Plan:     1. GI bleed- hgb 4.4, + Occult   - GI has re evaluated- no plan for endoscopy due to resp status- nurse states possible EGD as outpatient    2. Acute on chronic HFpEF-   - diurese per nephro    3.  Parox AF- in NSR  - AC on hold due to anemia   - given her FOBT + and need for PRBC- I dont feel it would be wise to start her back on [facility] until we have a clearance answer as cause her to GI bleed. - Will add Amio po to help maintain NSR  - I discussed may concerns in detail with Daughter Pat Raya over the phone and Daughter Ebony Ahuja over the phone. They will discuss with GI team what the plan is regarding possible endoscopy  - All questions answered to their satisfaction and they agree with my plan to hold off on [facility] until we get a definitive answer as to why she had her GIBleed and hgb of 4.4  - in mean time Daughter Ebony Ahuja requested limited Echo- will repeat    4. MV CAD with CABG in 2003 (patent LIMA-LAD and SVG-RCA in 2006)- stable. Continue BB & statin. 5. Effusions- s/p thora on R  - pulm following    If repeat echo is stable will follow from a far, can follow up in 2 weeks. Discussed with patient and nursing. Thank you for allowing me to participate in the care of this patient, please do not hesitate to call if you have any questions. Elizabeth Florence DO, [facility], [address], [address], [place] [address] [facility]  [facility]. [facility]  [phone]

## 2022-03-12 NOTE — PROGRESS NOTES
Pulmonary Progress Note  [facility]      Patient - Derek Farrell,  Age - 66 y.o.    - 1950      Room Number - 2123/2123-01   N -  [id number]   [facility]  Date of Admission -  3/1/2022  9:47 PM        Darrius Hancock MD  Primary Care Physician - Samantha Harrell MD     SUBJECTIVE   Actually looks quite comfortable, asking when she can go home    OBJECTIVE   VITALS    height is 4' 11\" (1.499 m) and weight is 152 lb 8.9 oz (69.2 kg). Her oral temperature is 98.9 °F (37.2 °C). Her blood pressure is 149/57 (abnormal) and her pulse is 89. Her respiration is 18 and oxygen saturation is 97%. Body mass index is 30.81 kg/m². Temperature Range: Temp: 98.9 °F (37.2 °C) Temp  Av.6 °F (29.2 °C)  Min: 32 °F (0 °C)  Max: 98.9 °F (37.2 °C)  BP Range:  Systolic (72NOG), REJI:847 , Min:124 , UHS:218     Diastolic (29TSB), HZA:29, Min:48, Max:57    Pulse Range: Pulse  Av.8  Min: 60  Max: 89  Respiration Range: Resp  Av.7  Min: 18  Max: 20  Current Pulse Ox[de-identified]  SpO2: 97 %  24HR Pulse Ox Range:  SpO2  Av.6 %  Min: 96 %  Max: 99 %  Oxygen Amount and Delivery: O2 Flow Rate (L/min): 1.5 L/min    Wt Readings from Last 3 Encounters:   22 152 lb 8.9 oz (69.2 kg)   22 156 lb 1.4 oz (70.8 kg)   22 143 lb 6.4 oz (65 kg)       I/O (24 Hours)    Intake/Output Summary (Last 24 hours) at 3/12/2022 0989  Last data filed at 3/12/2022 0309  Gross per 24 hour   Intake 240 ml   Output 1800 ml   Net -1560 ml       EXAM     General Appearance  Awake, alert, oriented, in no acute distress  HEENT - normocephalic, atraumatic.  []  Mallampati  [] Crowded airway   [] Macroglossia  []  Retrognathia  [] Micrognathia  []  Normal tongue size []  Normal Bite  [] Chelita sign positive    Neck - Supple,  trachea midline   Lungs -diminished breath sound  Cardiovascular - Heart sounds are normal. Regular rate and rhythm   Abdomen - Soft, nontender, nondistended, no masses or organomegaly  Neurologic - There are no focal motor or sensory deficits  Skin - No bruising or bleeding  Extremities - No clubbing, cyanosis, erythema and edema with excoriation on her skin and lower extremities  MEDS      metoprolol tartrate  50 mg Oral BID    bumetanide  2 mg IntraVENous TID    meropenem  1,000 mg IntraVENous Q12H    ipratropium-albuterol  1 ampule Inhalation Q4H    sodium chloride flush  5-40 mL IntraVENous 2 times per day    allopurinol  300 mg Oral Daily    atorvastatin  40 mg Oral Daily    magnesium oxide  400 mg Oral BID    miconazole   Topical BID    therapeutic multivitamin-minerals  1 tablet Oral Daily    pantoprazole  40 mg IntraVENous Daily    And    sodium chloride flush  10 mL IntraVENous Daily      sodium chloride 25 mL (03/10/22 2150)    sodium chloride       sodium chloride flush, sodium chloride, ondansetron **OR** ondansetron, magnesium hydroxide, acetaminophen **OR** acetaminophen, sodium chloride    LABS   CBC   Recent Labs     03/12/22  0525   WBC 5.5   HGB 8.9*   HCT 26.7*   MCV 92.2        BMP:   Lab Results   Component Value Date     03/12/2022    K 4.3 03/12/2022    CL 98 03/12/2022    CO2 33 03/12/2022    BUN 65 03/12/2022    LABALBU 2.3 03/05/2022    CREATININE 1.35 03/12/2022    CALCIUM 8.2 03/12/2022    GFRAA 47 03/12/2022    LABGLOM 39 03/12/2022     ABGs:  Lab Results   Component Value Date    PHART 7.459 03/09/2022    PO2ART 107.0 03/09/2022    KKO8LDE 39.9 03/09/2022      Lab Results   Component Value Date    MODE PRVC 03/09/2022     Ionized Calcium:  No results found for: IONCA  Magnesium:    Lab Results   Component Value Date    MG 2.0 03/12/2022     Phosphorus:    Lab Results   Component Value Date    PHOS 3.7 03/08/2022        LIVER PROFILE No results for input(s): AST, ALT, LIPASE, BILIDIR, BILITOT, ALKPHOS in the last 72 hours.     Invalid input(s): AMYLASE,  ALB  INR No results for input(s): INR in the last 72 hours.   PTT   Lab Results   Component Value Date    APTT 34.9 02/06/2022         RADIOLOGY     Chest x-ray shows slight increase in bilateral pleural effusion        ASSESSMENT/PLAN   Acute respiratory failure, hypoxic and hypercapnic, intubated 3/7; extubated 3/9  Acute on chronic diastolic heart failure with worsening bilateral effusions; status post right thoracentesis 3/10-transudative  COPD, clinical diagnosis no PFTs, no pulmonologist  History of tobacco use 30+ pack year  Nonmorbid obesity  AZIZA clinical diagnosis  Chronic kidney disease  Anemia  Full CODE STATUS    Did have a coughing spell overnight, will place on some antitussives as needed  Awaiting final pleural fluid analysis but consistent with transudative effusion secondary to heart failure  Getting aggressively diuresed with Bumex  At this point even though her pleural effusion has slightly increased I see no indication for repeat thoracentesis  Will need home oxygen evaluation prior to discharge  Electronically signed by Alphonso Fisher MD on 3/12/2022 at 9:29 AM

## 2022-03-12 NOTE — PROGRESS NOTES
Department of Internal Medicine  Nephrology Jasmine Rae MD  Progress note    Reason for consultation: Management of acute kidney injury superimposed on chronic kidney disease stage III. Consulting physician: Dante Gaytan MD.    Interval history: Patient was seen and examined. Shortness of breath is slowly improving however her chest x-ray continues to show persistent bilateral effusion and bilateral infiltrates possibly suggestive of pulmonary vascular congestion -patient developed acute hypoxic respiratory failure in the ICU and is  status post extubation on 3/9/2022. She underwent right thoracentesis with removal of 800 mL of pleural fluid on 3/10/22  Laboratory studies were reviewed. Renal function is stable    History of present illness: This is a 66 y.o. female with a significant past medical history of Heart Failure with reduced ejection fraction [HFrEF with LVEF 45 to 50% in July 2020 secondary to ischemic cardiomyopathy], Coronary artery disease, Meningioma, type 2 diabetes mellitus [with nonproliferative diabetic retinopathy], essential hypertension, bilateral deafness and chronic kidney disease stage III [baseline serum creatinine 1.6 mg/dL and follows up with Dr. Montrell Luevano of [facility] services of [place] whom she saw 1 week ago and was switched from Lasix to Bumex], presented with complaints of fatigue and dyspnea with exertion progressively worsened over 2 weeks. She had been recently admitted and seen by us at [facility] on 2/11/2022. Laboratory studies at presentation however were remarkable for hemoglobin 7.4 g/dL and BUN/creatinine 110/2.11 mg/dL. She denies hemoptysis or melena stools. However, hemoglobin dropped overnight to 4.4 g/dL and she is scheduled for endoscopy tomorrow.     Scheduled Meds:   amiodarone  200 mg Oral Daily    metoprolol tartrate  50 mg Oral BID    bumetanide  2 mg IntraVENous TID    meropenem  1,000 mg IntraVENous Q12H    ipratropium-albuterol  1 ampule Inhalation Q4H    sodium chloride flush  5-40 mL IntraVENous 2 times per day    allopurinol  300 mg Oral Daily    atorvastatin  40 mg Oral Daily    magnesium oxide  400 mg Oral BID    miconazole   Topical BID    therapeutic multivitamin-minerals  1 tablet Oral Daily    pantoprazole  40 mg IntraVENous Daily    And    sodium chloride flush  10 mL IntraVENous Daily     Continuous Infusions:   sodium chloride 25 mL (03/12/22 1048)    sodium chloride       PRN Meds:.benzonatate, dextromethorphan, sodium chloride flush, sodium chloride, ondansetron **OR** ondansetron, magnesium hydroxide, acetaminophen **OR** acetaminophen, sodium chloride    Physical Exam:    VITALS:  BP (!) 145/53   Pulse 88   Temp 97.6 °F (36.4 °C) (Oral)   Resp 18   Ht 4' 11\" (1.499 m)   Wt 152 lb 8.9 oz (69.2 kg)   SpO2 97%   BMI 30.81 kg/m²     Constitutional: Patient is sedated, intubated on ventilator    Skin: Skin color, texture, turgor normal. No rashes or lesions    Head: Normocephalic, without obvious abnormality, atraumatic     Cardiovascular/Edema: S1, S2, regular rate and rhythm with no pericardial rub. Respiratory: Diminished breath sounds bilaterally [right worse than left. Abdomen: Full, soft with normal bowel sounds. Back: symmetric, no curvature. ROM normal. No CVA tenderness. Extremities: Bilateral pedal edema.   Neuro:  Grossly normal      CBC:   Recent Labs     03/10/22  0435 03/11/22  0433 03/12/22  0525   WBC 8.2 6.4 5.5   HGB 8.0* 8.2* 8.9*    253 247     BMP:    Recent Labs     03/10/22  0435 03/11/22  0433 03/12/22  0525    138 139   K 4.1 4.1 4.3    100 98   CO2 28 30 33*   BUN 60* 62* 65*   CREATININE 1.48* 1.49* 1.35*   GLUCOSE 108* 116* 99       Lab Results   Component Value Date    NITRU NEGATIVE 02/22/2022    COLORU Yellow 02/22/2022    PHUR 5.0 02/22/2022    WBCUA 10 TO 20 02/22/2022    RBCUA TOO NUMEROUS TO COUNT 02/22/2022    MUCUS NOT REPORTED 02/07/2022    TRICHOMONAS NOT REPORTED 02/07/2022    YEAST NOT REPORTED 02/07/2022    BACTERIA None 02/22/2022    SPECGRAV 1.016 02/22/2022    LEUKOCYTESUR NEGATIVE 02/22/2022    UROBILINOGEN Normal 02/22/2022    BILIRUBINUR NEGATIVE 02/22/2022    GLUCOSEU TRACE 02/22/2022    KETUA NEGATIVE 02/22/2022    AMORPHOUS NOT REPORTED 02/07/2022     Urine Protein:     Lab Results   Component Value Date     10/27/2020     Urine Creatinine:     Lab Results   Component Value Date    LABCREA 26.2 05/26/2021     Imaging studies. Chest x-ray increasing congestive changes small-to-moderate bilateral pleural effusion      IMPRESSION/RECOMMENDATIONS:      1. Acute kidney injury superimposed on chronic kidney disease stage III - most consistent with prerenal azotemia led to dehydration and anemia. Patient has ongoing indications for continuation of diuretics. Renal function is improving with creatinine of 1.35 mg/dL today. Monitor urine output closely. Avoid nephrotoxic agents. Basic metabolic profile daily. 2.  Systemic hypertension - Blood pressure is adequately controlled. 3.  Acute on chronic anemia - rule out GI bleed. Hemoglobin today is 8.9 g/dL. 4.  Decompensated heart failure with preserved ejection fraction [HFpEF] - continue Bumex 2 mg IV 3 times daily. Monitor strict I's and O's, 1500 mils fluid restriction and 2 g sodium diet daily  Add metolazone 5 mg daily x3 days  No current indication for ultrafiltration. 5.  Right pleural effusion s/p thoracentesis [3/10/2022]. Prognosis is guarded.     Tomás Siddiqi MD   Attending Nephrologist  3/12/2022 2:23 PM

## 2022-03-13 LAB
ANION GAP SERPL CALCULATED.3IONS-SCNC: 8 MMOL/L (ref 9–17)
BUN BLDV-MCNC: 61 MG/DL (ref 8–23)
CALCIUM SERPL-MCNC: 8.7 MG/DL (ref 8.6–10.4)
CHLORIDE BLD-SCNC: 98 MMOL/L (ref 98–107)
CO2: 34 MMOL/L (ref 20–31)
CREAT SERPL-MCNC: 1.29 MG/DL (ref 0.5–0.9)
GFR AFRICAN AMERICAN: 49 ML/MIN
GFR NON-AFRICAN AMERICAN: 41 ML/MIN
GFR SERPL CREATININE-BSD FRML MDRD: ABNORMAL ML/MIN/{1.73_M2}
GLUCOSE BLD-MCNC: 136 MG/DL (ref 65–105)
GLUCOSE BLD-MCNC: 155 MG/DL (ref 65–105)
GLUCOSE BLD-MCNC: 158 MG/DL (ref 65–105)
GLUCOSE BLD-MCNC: 94 MG/DL (ref 70–99)
GLUCOSE BLD-MCNC: 97 MG/DL (ref 65–105)
HCT VFR BLD CALC: 27.6 % (ref 36–46)
HEMOGLOBIN: 9 G/DL (ref 12–16)
MAGNESIUM: 2 MG/DL (ref 1.6–2.6)
MCH RBC QN AUTO: 30.2 PG (ref 26–34)
MCHC RBC AUTO-ENTMCNC: 32.5 G/DL (ref 31–37)
MCV RBC AUTO: 92.7 FL (ref 80–100)
PDW BLD-RTO: 18.1 % (ref 11.5–14.9)
PLATELET # BLD: 251 K/UL (ref 150–450)
PMV BLD AUTO: 7.7 FL (ref 6–12)
POTASSIUM SERPL-SCNC: 3.8 MMOL/L (ref 3.7–5.3)
RBC # BLD: 2.98 M/UL (ref 4–5.2)
SODIUM BLD-SCNC: 140 MMOL/L (ref 135–144)
WBC # BLD: 5.1 K/UL (ref 3.5–11)

## 2022-03-13 PROCEDURE — 36415 COLL VENOUS BLD VENIPUNCTURE: CPT

## 2022-03-13 PROCEDURE — C9113 INJ PANTOPRAZOLE SODIUM, VIA: HCPCS | Performed by: INTERNAL MEDICINE

## 2022-03-13 PROCEDURE — 6370000000 HC RX 637 (ALT 250 FOR IP): Performed by: INTERNAL MEDICINE

## 2022-03-13 PROCEDURE — 2580000003 HC RX 258: Performed by: INTERNAL MEDICINE

## 2022-03-13 PROCEDURE — 94640 AIRWAY INHALATION TREATMENT: CPT

## 2022-03-13 PROCEDURE — 6360000002 HC RX W HCPCS: Performed by: INTERNAL MEDICINE

## 2022-03-13 PROCEDURE — APPSS30 APP SPLIT SHARED TIME 16-30 MINUTES: Performed by: NURSE PRACTITIONER

## 2022-03-13 PROCEDURE — 85027 COMPLETE CBC AUTOMATED: CPT

## 2022-03-13 PROCEDURE — 99232 SBSQ HOSP IP/OBS MODERATE 35: CPT | Performed by: INTERNAL MEDICINE

## 2022-03-13 PROCEDURE — 2700000000 HC OXYGEN THERAPY PER DAY

## 2022-03-13 PROCEDURE — 83735 ASSAY OF MAGNESIUM: CPT

## 2022-03-13 PROCEDURE — 82947 ASSAY GLUCOSE BLOOD QUANT: CPT

## 2022-03-13 PROCEDURE — 6370000000 HC RX 637 (ALT 250 FOR IP)

## 2022-03-13 PROCEDURE — 1200000000 HC SEMI PRIVATE

## 2022-03-13 PROCEDURE — 94761 N-INVAS EAR/PLS OXIMETRY MLT: CPT

## 2022-03-13 PROCEDURE — 2500000003 HC RX 250 WO HCPCS: Performed by: INTERNAL MEDICINE

## 2022-03-13 PROCEDURE — 80048 BASIC METABOLIC PNL TOTAL CA: CPT

## 2022-03-13 RX ADMIN — APIXABAN 5 MG: 5 TABLET, FILM COATED ORAL at 11:27

## 2022-03-13 RX ADMIN — AMIODARONE HYDROCHLORIDE 200 MG: 200 TABLET ORAL at 09:21

## 2022-03-13 RX ADMIN — Medication 60 MG: at 11:28

## 2022-03-13 RX ADMIN — ANTI-FUNGAL POWDER MICONAZOLE NITRATE TALC FREE: 1.42 POWDER TOPICAL at 09:22

## 2022-03-13 RX ADMIN — PANTOPRAZOLE SODIUM 40 MG: 40 INJECTION, POWDER, FOR SOLUTION INTRAVENOUS at 09:21

## 2022-03-13 RX ADMIN — IPRATROPIUM BROMIDE AND ALBUTEROL SULFATE 1 AMPULE: 2.5; .5 SOLUTION RESPIRATORY (INHALATION) at 15:16

## 2022-03-13 RX ADMIN — METOPROLOL TARTRATE 50 MG: 50 TABLET, FILM COATED ORAL at 09:21

## 2022-03-13 RX ADMIN — IPRATROPIUM BROMIDE AND ALBUTEROL SULFATE 1 AMPULE: 2.5; .5 SOLUTION RESPIRATORY (INHALATION) at 23:09

## 2022-03-13 RX ADMIN — MAGNESIUM OXIDE TAB 400 MG (241.3 MG ELEMENTAL MG) 400 MG: 400 (241.3 MG) TAB at 22:12

## 2022-03-13 RX ADMIN — ATORVASTATIN CALCIUM 40 MG: 40 TABLET, FILM COATED ORAL at 09:21

## 2022-03-13 RX ADMIN — BUMETANIDE 2 MG: 0.25 INJECTION, SOLUTION INTRAMUSCULAR; INTRAVENOUS at 15:09

## 2022-03-13 RX ADMIN — IPRATROPIUM BROMIDE AND ALBUTEROL SULFATE 1 AMPULE: 2.5; .5 SOLUTION RESPIRATORY (INHALATION) at 03:37

## 2022-03-13 RX ADMIN — SODIUM CHLORIDE, PRESERVATIVE FREE 10 ML: 5 INJECTION INTRAVENOUS at 22:13

## 2022-03-13 RX ADMIN — ACETAMINOPHEN 650 MG: 325 TABLET, FILM COATED ORAL at 22:34

## 2022-03-13 RX ADMIN — ACETAMINOPHEN 650 MG: 325 TABLET, FILM COATED ORAL at 15:32

## 2022-03-13 RX ADMIN — METOLAZONE 5 MG: 2.5 TABLET ORAL at 09:21

## 2022-03-13 RX ADMIN — IPRATROPIUM BROMIDE AND ALBUTEROL SULFATE 1 AMPULE: 2.5; .5 SOLUTION RESPIRATORY (INHALATION) at 11:17

## 2022-03-13 RX ADMIN — METOPROLOL TARTRATE 50 MG: 50 TABLET, FILM COATED ORAL at 22:12

## 2022-03-13 RX ADMIN — SODIUM CHLORIDE, PRESERVATIVE FREE 10 ML: 5 INJECTION INTRAVENOUS at 09:21

## 2022-03-13 RX ADMIN — MULTIPLE VITAMINS W/ MINERALS TAB 1 TABLET: TAB at 09:20

## 2022-03-13 RX ADMIN — ACETAMINOPHEN 650 MG: 325 TABLET, FILM COATED ORAL at 03:25

## 2022-03-13 RX ADMIN — MEROPENEM 1000 MG: 1 INJECTION, POWDER, FOR SOLUTION INTRAVENOUS at 10:51

## 2022-03-13 RX ADMIN — BUMETANIDE 2 MG: 0.25 INJECTION, SOLUTION INTRAMUSCULAR; INTRAVENOUS at 09:21

## 2022-03-13 RX ADMIN — BUMETANIDE 2 MG: 0.25 INJECTION, SOLUTION INTRAMUSCULAR; INTRAVENOUS at 22:12

## 2022-03-13 RX ADMIN — MAGNESIUM OXIDE TAB 400 MG (241.3 MG ELEMENTAL MG) 400 MG: 400 (241.3 MG) TAB at 09:21

## 2022-03-13 RX ADMIN — IPRATROPIUM BROMIDE AND ALBUTEROL SULFATE 1 AMPULE: 2.5; .5 SOLUTION RESPIRATORY (INHALATION) at 19:24

## 2022-03-13 RX ADMIN — SODIUM CHLORIDE, PRESERVATIVE FREE 10 ML: 5 INJECTION INTRAVENOUS at 09:22

## 2022-03-13 RX ADMIN — MEROPENEM 1000 MG: 1 INJECTION, POWDER, FOR SOLUTION INTRAVENOUS at 22:31

## 2022-03-13 RX ADMIN — ALLOPURINOL 300 MG: 300 TABLET ORAL at 09:21

## 2022-03-13 RX ADMIN — IPRATROPIUM BROMIDE AND ALBUTEROL SULFATE 1 AMPULE: 2.5; .5 SOLUTION RESPIRATORY (INHALATION) at 07:35

## 2022-03-13 ASSESSMENT — PAIN SCALES - GENERAL
PAINLEVEL_OUTOF10: 3
PAINLEVEL_OUTOF10: 0
PAINLEVEL_OUTOF10: 0
PAINLEVEL_OUTOF10: 6
PAINLEVEL_OUTOF10: 0
PAINLEVEL_OUTOF10: 3
PAINLEVEL_OUTOF10: 0
PAINLEVEL_OUTOF10: 0
PAINLEVEL_OUTOF10: 4

## 2022-03-13 ASSESSMENT — PAIN DESCRIPTION - LOCATION: LOCATION: HEAD

## 2022-03-13 ASSESSMENT — PAIN DESCRIPTION - PAIN TYPE: TYPE: ACUTE PAIN

## 2022-03-13 NOTE — PROGRESS NOTES
SpO2 100%   BMI 29.25 kg/m²   Constitutional and General Appearance: intubated sedated  HEENT: sedated  Respiratory:  · Intubated on vent  · Clear but diminished throughout. No rales  · No distress  Cardiovascular:  · Heart tones are crisp and normal. regular S1 and S2.  · Tele SR  · Peripheral pulses are symmetrical and full   Abdomen:   · Soft  · No masses or tenderness  Extremities:  ·  No Cyanosis or Clubbing  ·  Lower extremity edema: No  ·  Skin: Warm and dry  Neurological:  · Sedated    EKG 3/1/2022: NSR, LVH, Inferior infarct, NS ST T changes. Previous cardiac testing:   CATH 7/21/06:   Patent LIMA to LAD and SVG to RCA.  Occluded SVG to OM 2.  Occluded RCA.      CABG with LIMA-LAD, SVG-OM and SVG-RCA in 2003.     TTE/CV July 2020  Summary:   1. A KARLA was performed without complications. 2. Normal LV size with normal LVEF. 3. No thrombus or valvular vegetation identified  4. Moderate atheromatous disease noted in aortic arch     CARDIOVERSION:  After an adequate level of sedation was achieved, 200J in biphasic synchronized delivery was administered. conversion to normal sinus rhythm. The patient awoke without complications     Echo 0/6/51  Summary  Normal left ventricle size and function with an estimated EF > 55%. No segmental wall motion abnormalities seen. Moderate left ventricular hypertrophy. Normal right ventricular size and function. Left atrial dilatation. Aortic valve is trileaflet. Mild aortic stenosis. Mild-moderate aortic insufficiency. Normal aortic root dimension. Mitral annular calcification is seen. Mild mitral regurgitation. Mild tricuspid regurgitation. Normal right ventricular systolic pressure. No significant pericardial effusion is seen. Pleural effusion present. Assessment & Plan:     1. GI bleed- hgb 4.4, + Occult   - GI has re evaluated- okay for AC    2. Acute on chronic HFpEF-   - diurese per nephro  - family wanting echo- pending    3.  Parox AF- in NSR  - GI has provided clearance for [facility]  - will start eliquis    4. MV CAD with CABG in 2003 (patent LIMA-LAD and SVG-RCA in 2006)- stable. Continue BB & statin. 5. Effusions- s/p thora on R  - pulm following      Discussed with patient and nursing. Thank you for allowing me to participate in the care of this patient, please do not hesitate to call if you have any questions. Eric Charles DO, [facility] [place], [address], [address], [place] [facility]  [facility] [email or web address]  [phone]

## 2022-03-13 NOTE — CARE COORDINATION
ONGOING DISCHARGE PLAN:    Spoke with patient's Daughter, Jean Pierre Birch phone, regarding discharge plan and she confirms that plan is still for pt. To return to home. However, she was very upset, that the  Informed her that she would be ready for DC jojo, & they still haven't found out why she is bleeding. HGB today 9.0. rolo De La O, the Doctor, to call her [place], [place], at [phone], to discuss condition. Writer notified, Dr. Tayo Rick, she will call her jojo. CR 1.29, Bun 61. Per GI Notes, Cleared for EGD/Colonoscopy, ? Tuesday. Cl liquid diet. Will continue to follow for additional discharge needs.     Electronically signed by Sal Ruiz RN on 3/13/2022 at 4:33 PM

## 2022-03-13 NOTE — PROGRESS NOTES
Pulmonary Progress Note  [facility]      Patient - Chris Cuevas,  Age - 66 y.o.    - 1950      Room Number - 2123/2123-01   N -  [id number]   [facility]  Date of Admission -  3/1/2022  9:47 PM        Consulting Selina Romeo MD  Primary Care Physician - Higinio Edward MD     SUBJECTIVE   Looks comfortable still has a cough but better    OBJECTIVE   VITALS    height is 4' 11\" (1.499 m) and weight is 144 lb 13.5 oz (65.7 kg). Her oral temperature is 97.9 °F (36.6 °C). Her blood pressure is 148/53 (abnormal) and her pulse is 86. Her respiration is 18 and oxygen saturation is 100%. Body mass index is 29.25 kg/m². Temperature Range: Temp: 97.9 °F (36.6 °C) Temp  Av.8 °F (36.6 °C)  Min: 97.6 °F (36.4 °C)  Max: 97.9 °F (36.6 °C)  BP Range:  Systolic (53DBT), IRP:324 , Min:123 , WZE:496     Diastolic (01EZW), YAE:81, Min:53, Max:75    Pulse Range: Pulse  Av.3  Min: 86  Max: 88  Respiration Range: Resp  Av.3  Min: 18  Max: 20  Current Pulse Ox[de-identified]  SpO2: 100 %  24HR Pulse Ox Range:  SpO2  Av.7 %  Min: 97 %  Max: 100 %  Oxygen Amount and Delivery: O2 Flow Rate (L/min): 2 L/min    Wt Readings from Last 3 Encounters:   22 144 lb 13.5 oz (65.7 kg)   22 156 lb 1.4 oz (70.8 kg)   22 143 lb 6.4 oz (65 kg)       I/O (24 Hours)    Intake/Output Summary (Last 24 hours) at 3/13/2022 1057  Last data filed at 3/13/2022 0648  Gross per 24 hour   Intake 780 ml   Output 1750 ml   Net -970 ml       EXAM     General Appearance  Awake, alert, oriented, in no acute distress  HEENT - normocephalic, atraumatic.  []  Mallampati  [x] Crowded airway   [] Macroglossia  []  Retrognathia  [x] Micrognathia  []  Normal tongue size []  Normal Bite  [] Kimball sign positive    Neck - Supple,  trachea midline   Lungs -diminished with crackles  Cardiovascular - Heart sounds are normal.  Regular rate and rhythm   Abdomen - Soft, nontender, nondistended, no masses or organomegaly  Neurologic - There are no focal motor or sensory deficits  Skin - No bruising or bleeding  Extremities - No clubbing, cyanosis, edema    MEDS      apixaban  5 mg Oral BID    amiodarone  200 mg Oral Daily    metOLazone  5 mg Oral Daily    metoprolol tartrate  50 mg Oral BID    bumetanide  2 mg IntraVENous TID    meropenem  1,000 mg IntraVENous Q12H    ipratropium-albuterol  1 ampule Inhalation Q4H    sodium chloride flush  5-40 mL IntraVENous 2 times per day    allopurinol  300 mg Oral Daily    atorvastatin  40 mg Oral Daily    magnesium oxide  400 mg Oral BID    miconazole   Topical BID    therapeutic multivitamin-minerals  1 tablet Oral Daily    pantoprazole  40 mg IntraVENous Daily    And    sodium chloride flush  10 mL IntraVENous Daily      sodium chloride 25 mL (03/12/22 1048)    sodium chloride       benzonatate, dextromethorphan, sodium chloride flush, sodium chloride, ondansetron **OR** ondansetron, magnesium hydroxide, acetaminophen **OR** acetaminophen, sodium chloride    LABS   CBC   Recent Labs     03/13/22  0544   WBC 5.1   HGB 9.0*   HCT 27.6*   MCV 92.7        BMP:   Lab Results   Component Value Date     03/13/2022    K 3.8 03/13/2022    CL 98 03/13/2022    CO2 34 03/13/2022    BUN 61 03/13/2022    LABALBU 2.3 03/05/2022    CREATININE 1.29 03/13/2022    CALCIUM 8.7 03/13/2022    GFRAA 49 03/13/2022    LABGLOM 41 03/13/2022     ABGs:  Lab Results   Component Value Date    PHART 7.459 03/09/2022    PO2ART 107.0 03/09/2022    AIN5PPN 39.9 03/09/2022      Lab Results   Component Value Date    MODE PRVC 03/09/2022     Ionized Calcium:  No results found for: IONCA  Magnesium:    Lab Results   Component Value Date    MG 2.0 03/13/2022     Phosphorus:    Lab Results   Component Value Date    PHOS 3.7 03/08/2022        LIVER PROFILE No results for input(s): AST, ALT, LIPASE, BILIDIR, BILITOT, ALKPHOS in the last 72 hours. Invalid input(s): AMYLASE,  ALB  INR No results for input(s): INR in the last 72 hours.   PTT   Lab Results   Component Value Date    APTT 34.9 02/06/2022         RADIOLOGY     (See actual reports for details)    ASSESSMENT/PLAN   Acute respiratory failure, hypoxic and hypercapnic, intubated 3/7; extubated 3/9  Acute on chronic diastolic heart failure with worsening bilateral effusions; status post right thoracentesis 3/10-transudative  COPD, clinical diagnosis no PFTs, no pulmonologist  History of tobacco use 30+ pack year  Nonmorbid obesity  AZIZA clinical diagnosis  Chronic kidney disease  Anemia  Full CODE STATUS    She is appropriate in terms of mental status  Continue diuresis  Wean oxygen off  Okay to proceed with EGD colonoscopy, probably will not get her in a better shape than she has now  I would watch her for oversedation during the procedure  Add flutter valve therapy  Home oxygen evaluation prior to discharge  Outpatient pulmonary function testing    Electronically signed by Ba Schmidt MD on 3/13/2022 at 10:57 AM

## 2022-03-13 NOTE — PROGRESS NOTES
BRONCHOSPASM/BRONCHOCONSTRICTION     [x]         IMPROVE AERATION/BREATH SOUNDS  [x]   ADMINISTER BRONCHODILATOR THERAPY AS APPROPRIATE  [x]   ASSESS BREATH SOUNDS  []   IMPLEMENT AEROSOL/MDI PROTOCOL  [x]   PATIENT EDUCATION AS NEEDED    PROVIDE ADEQUATE OXYGENATION WITH ACCEPTABLE SP02/ABG'S    [x]  IDENTIFY APPROPRIATE OXYGEN THERAPY  [x]   MONITOR SP02/ABG'S AS NEEDED   [x]   PATIENT EDUCATION AS NEEDED  Pt currently awake and on 2lnc SpO2 97% diminished breath sounds through out. l pt does have a moist non-productive cough .  Pt does not appear to be in any distress just appears tired

## 2022-03-13 NOTE — PROGRESS NOTES
[facility]    Gastroenterology Daily Progress Note      Patient:   Eris Rey   :    [birth year]   Facility:   [facility]  Date:     3/13/2022  Consultant:   MAL Samayoa CNP, CNP      SUBJECTIVE  66 y.o. female admitted 3/1/2022 with Acute GI bleeding [K92.2]  Symptomatic anemia [D64.9] and seen for iron deficiency anemia. The pt was seen and examined. hgb 9. No c/o abdominal pain or nausea.          OBJECTIVE  Scheduled Meds:   apixaban  5 mg Oral BID    amiodarone  200 mg Oral Daily    metOLazone  5 mg Oral Daily    metoprolol tartrate  50 mg Oral BID    bumetanide  2 mg IntraVENous TID    meropenem  1,000 mg IntraVENous Q12H    ipratropium-albuterol  1 ampule Inhalation Q4H    sodium chloride flush  5-40 mL IntraVENous 2 times per day    allopurinol  300 mg Oral Daily    atorvastatin  40 mg Oral Daily    magnesium oxide  400 mg Oral BID    miconazole   Topical BID    therapeutic multivitamin-minerals  1 tablet Oral Daily    pantoprazole  40 mg IntraVENous Daily    And    sodium chloride flush  10 mL IntraVENous Daily       Vital Signs:  BP (!) 148/53   Pulse 86   Temp 97.9 °F (36.6 °C) (Oral)   Resp 18   Ht 4' 11\" (1.499 m)   Wt 144 lb 13.5 oz (65.7 kg)   SpO2 96%   BMI 29.25 kg/m²      Physical Exam:     General Appearance: alert and oriented to person, place and time, well-developed and well-nourished, in no acute distress  Skin: warm and dry, no rash or erythema  Head: normocephalic and atraumatic  Eyes: pupils equal, round, and reactive to light, extraocular eye movements intact, conjunctivae normal  ENT: hearing grossly normal bilaterally  Neck: neck supple and non tender without mass, no thyromegaly or thyroid nodules, no cervical lymphadenopathy   Pulmonary/Chest: clear to auscultation bilaterally- no wheezes, rales or rhonchi, normal air movement, no respiratory distress  Cardiovascular: normal rate, regular rhythm, normal S1 and S2, no murmurs, rubs, clicks or gallops, distal pulses intact, no carotid bruits  Abdomen: soft, non-tender, non-distended, normal bowel sounds, no masses or organomegaly obese   Extremities: no cyanosis, clubbing or edema  Musculoskeletal: normal range of motion, no joint swelling, deformity or tenderness  Neurologic: no cranial nerve deficit and muscle strength normal    Lab and Imaging Review     CBC  Recent Labs     03/11/22 0433 03/12/22  0525 03/13/22  0544   WBC 6.4 5.5 5.1   HGB 8.2* 8.9* 9.0*   HCT 25.2* 26.7* 27.6*   MCV 93.5 92.2 92.7    247 251       BMP  Recent Labs     03/11/22 0433 03/12/22  0525 03/13/22  0544    139 140   K 4.1 4.3 3.8    98 98   CO2 30 33* 34*   BUN 62* 65* 61*   CREATININE 1.49* 1.35* 1.29*   GLUCOSE 116* 99 94   CALCIUM 8.0* 8.2* 8.7       LFTS  Recent Labs     03/10/22  1150   PROT 4.7*     US LIVER     Result Date: 3/2/2022  EXAMINATION: RIGHT UPPER QUADRANT ULTRASOUND 3/2/2022 1:45 pm  FINDINGS: LIVER:  Diffuse increased liver parenchymal echogenicity is nonspecific but can be seen in patients with hepatic steatosis or diffuse hepatocellular process. .  No focal liver lesion.  No intrahepatic biliary ductal dilatation. The portal vein is patent and demonstrates biphasic flow which can be seen in patients with tricuspid regurgitation. BILIARY SYSTEM:  Gallbladder is unremarkable without evidence of pericholecystic fluid, wall thickening or stones.  Negative sonographic Graf's sign. Common bile duct is within normal limits measuring 4 mm. RIGHT KIDNEY: Diffuse increased renal parenchymal echogenicity suggestive chronic medical renal.  No right hydronephrosis PANCREAS:  Visualized portions of the pancreas are unremarkable. OTHER: No evidence of right upper quadrant ascites.      Diffuse increased heterogeneous liver parenchymal echogenicity could be seen in patients with hepatic steatosis or diffuse hepatocellular process.  Patent portal vein with biphasic flow can be seen in patient with tricuspid regurgitation. RECOMMENDATIONS: Unavailable       ASSESSMENT/plan  1. Iron deficiency anemia  - cleared for egd and colonoscopy from a pulmonary standpoint , will place on clear diet today, discussed with md; possible scope Tuesday if daughter agreeable, d/w dr Martina Urias who will discuss with the daughter-she is currently not at the bedside  -iron replacement  -ppi  -hold the ac        2. Elevated lft's  Us showing possible steatosis vs hepatocellular process  -alk isoenzyme shows elevation in bone     3. JOY     4.decompensated heart failure     5.acute respiratory failure      This plan was formulated in collaboration with Dr. Adrianna Velasquez .     Electronically signed by: MAL Maldonado CNP on 3/13/2022 at 12:34 PM

## 2022-03-13 NOTE — PLAN OF CARE
Problem: Skin Integrity:  Goal: Will show no infection signs and symptoms  Description: Will show no infection signs and symptoms  3/13/2022 0130 by Raphael Demarco RN  Outcome: Ongoing     Problem: Falls - Risk of:  Goal: Will remain free from falls  Description: Will remain free from falls  3/13/2022 0130 by Raphael Demarco RN  Outcome: Ongoing     Problem: Nutrition  Goal: Optimal nutrition therapy  3/13/2022 0130 by Raphael Demarco RN  Outcome: Ongoing     Problem: Pain:  Goal: Pain level will decrease  Description: Pain level will decrease  3/13/2022 0130 by Raphael Demarco RN  Outcome: Ongoing     Problem: OXYGENATION/RESPIRATORY FUNCTION  Goal: Patient will maintain patent airway  3/13/2022 0130 by Raphael Demarco RN  Outcome: Ongoing

## 2022-03-13 NOTE — PROGRESS NOTES
[place] [id number] Internal Medicine    Progress Note    3/13/2022    10:58 AM    Name:   Jose Juan Benjamin  MRN:     [id number]     Acct:      [de-identified]   Room:   [address] [place] [address] Day:  11  Admit Date:  3/1/2022  9:47 PM    PCP:   Hue Irby MD  Code Status:  Full Code    Subjective:     C/C:   Chief Complaint   Patient presents with    Fatigue         HPI:     See HPI    Review of Systems:   Positive for shortness of breath no new cough  Denies chest pain or palpitations  Denies abdominal pain, diarrhea vomiting  Denies any new numbness tremors or weakness. Medications: Allergies:     Allergies   Allergen Reactions    Latex Rash    Aleve [Naproxen Sodium]      Chronic kidney disease stage III, CHF    Pioglitazone Other (See Comments)     Congestive heart failure    Claritin [Loratadine]     Keflex [Cephalexin]     Lisinopril      Needs clarification of contraindication       Current Meds:   Scheduled Meds:    apixaban  5 mg Oral BID    amiodarone  200 mg Oral Daily    metOLazone  5 mg Oral Daily    metoprolol tartrate  50 mg Oral BID    bumetanide  2 mg IntraVENous TID    meropenem  1,000 mg IntraVENous Q12H    ipratropium-albuterol  1 ampule Inhalation Q4H    sodium chloride flush  5-40 mL IntraVENous 2 times per day    allopurinol  300 mg Oral Daily    atorvastatin  40 mg Oral Daily    magnesium oxide  400 mg Oral BID    miconazole   Topical BID    therapeutic multivitamin-minerals  1 tablet Oral Daily    pantoprazole  40 mg IntraVENous Daily    And    sodium chloride flush  10 mL IntraVENous Daily     Continuous Infusions:    sodium chloride 25 mL (03/12/22 1048)    sodium chloride       PRN Meds: benzonatate, dextromethorphan, sodium chloride flush, sodium chloride, ondansetron **OR** ondansetron, magnesium hydroxide, acetaminophen **OR** acetaminophen, sodium chloride    Data:     Past Medical History:   has a past medical history of Acute CVA (cerebrovascular accident) ([facility] [address] Altered mental status, Arthritis, Brain mass, Cellulitis and abscess, Cellulitis and abscess of unspecified site, Cellulitis of both lower extremities, Cellulitis of left lower extremity, Cellulitis of lower extremity, CHF (congestive heart failure) ([facility] [address] Chronic kidney disease, unspecified, Coronary atherosclerosis of native coronary artery, Essential hypertension, Essential hypertension, malignant, Hallucinations, Hard of hearing, Head contusion, Hypokalemia, Iron deficiency anemia, unspecified, Meningioma ([facility] [address] Myocardial infarction ([facility] [address] Other and unspecified hyperlipidemia, Pure hypercholesterolemia, Toxic metabolic encephalopathy, Type II or unspecified type diabetes mellitus without mention of complication, not stated as uncontrolled, Unspecified asthma(493.90), Unspecified venous (peripheral) insufficiency, Unspecified vitamin D deficiency, and UTI (urinary tract infection). Social History:   reports that she quit smoking about 22 years ago. She has a 2.50 pack-year smoking history. She has never used smokeless tobacco. She reports previous alcohol use. She reports that she does not use drugs. Family History:   Family History   Problem Relation Age of Onset    Diabetes Mother     Heart Disease Mother     Heart Disease Father     Diabetes Sister     High Blood Pressure Sister     Diabetes Maternal Grandmother        Vitals:  BP (!) 148/53   Pulse 86   Temp 97.9 °F (36.6 °C) (Oral)   Resp 18   Ht 4' 11\" (1.499 m)   Wt 144 lb 13.5 oz (65.7 kg)   SpO2 100%   BMI 29.25 kg/m²   Temp (24hrs), Av.8 °F (36.6 °C), Min:97.6 °F (36.4 °C), Max:97.9 °F (36.6 °C)    Recent Labs     22  1143 22  1614 22  0629   POCGLU 122* 128* 189* 97       I/O (24Hr):     Intake/Output Summary (Last 24 hours) at 3/13/2022 1058  Last data filed at 3/13/2022 0648  Gross per 24 hour   Intake 780 ml   Output 1750 ml   Net -970 ml Labs:    Lab Results   Component Value Date    WBC 5.1 03/13/2022    HGB 9.0 (L) 03/13/2022    HCT 27.6 (L) 03/13/2022    MCV 92.7 03/13/2022     03/13/2022     Lab Results   Component Value Date     03/13/2022    K 3.8 03/13/2022    CL 98 03/13/2022    CO2 34 03/13/2022    BUN 61 03/13/2022    CREATININE 1.29 03/13/2022    GLUCOSE 94 03/13/2022    CALCIUM 8.7 03/13/2022          Lab Results   Component Value Date/Time    SPECIAL RIGHT 03/10/2022 11:00 AM     Lab Results   Component Value Date/Time    CULTURE NO GROWTH 3 DAYS 03/10/2022 11:00 AM         Radiology:    Recent data reviewed    Physical Examination:        General appearance:  alert, cooperative and no distress  Eyes: Anicteric sclera. Pupils are equally round and reactive to light. Extraocular movements are intact. Lungs:  B/l reduced air entry, bilateral crackles.    Heart:  regular rate and rhythm, no murmur  Abdomen:  soft, nontender, nondistended, normal bowel sounds, no masses, hepatomegaly, splenomegaly  Extremities:  no edema, redness, tenderness in the calves  Skin:  no gross lesions, rashes, induration  Neuro:  Alert, oriented X 3, no new focal weakness  Assessment:        Primary Problem  Acute GI bleeding    Active Hospital Problems    Diagnosis Date Noted    Mild malnutrition ([facility] [address] [E44.1] 03/08/2022    Symptomatic anemia [D64.9]     Family history of colon cancer [Z80.0]     Family history of pancreatic cancer [Z80.0]     Elevated LFTs [R79.89] 02/25/2021    Paroxysmal atrial fibrillation ([facility] [address] [I48.0] 07/28/2020    Stage 3 chronic kidney disease ([facility] [address] [N18.30]            DVT prophylaxis: Mechanical  Antibiotics: Day 5 of Merrem    Plan:        69-year-old lady with history of atrial fibrillation history of coronary disease stent placed 20 years ago on Eliquis and Plavix 75 baseline hemoglobin was 10 2 weeks ago admitted with gradual increase of fatigue dyspnea exertion sleepy most part of the day no fever no vomiting blood no blood in the stool no dark stools no diarrhea hemoglobin ER was 4.4 suspected GI bleed Eliquis and Plavix held n.p.o. for now upper and lower endoscopies GI consulted  IV Venofer 200 mg daily  1 unit of PRBC given hemoglobin improved to 6 will need another unit of PRBC  Ckd,nephro consult,pt was due to see dr Jacki Spear for ckd    Needs EGD but has been difficult to obtain due to delirium, CO2 narcosis, respiratory acidosis, CHF exacerbation  Cardiology nephrology pulmonology following    3/7  Was moved out from ICU last night  Did not wear BiPAP overnight, chest x-ray worsening with bilateral effusions-we will await blood recommendation regarding thoracentesis  ABG did not show hypoxia hypercapnia  Was also started on Seroquel yesterday for hallucination anxiety and insomnia-suspect residual sedation from this; will reevaluate later today  EGD canceled for today      3/11  Patient was extubated on 3/9, mental status back to baseline respiration improved, transferred to floor on 3/10  Underwent thoracentesis on the right on 3/ ml out  Continues to be diuresed with IV Bumex 2mg tid  Has bilateral crackes, cough- needs to continue iv diuresis  CXR tomorrow  EGD will be done outpatiet    3/12  Overall doing well, oxygen requirements reduced to 1.5 L by nasal cannula  Continues on IV Bumex diuresis  Creatinine improving  Hemoglobin better today at 8.9  Repeat echo ordered by cardiology and pending    3/13  Patient continues diuresis, metolazone added to her diuretics, creatinine continues to improve  Hemoglobin 9 today has been uptrending; Eliquis has been resumed today will monitor H&H  Patient appears brighter, in no distress, doing well on 1 L nasal cannula oxygen  Repeat echo to be done tomorrow  Plan discussed with daughter Fanta Rolle in room-if H&H is stable tomorrow we can plan on discharging patient back to home tomorrow  Okay to move to [facility] today    Izabel Causey, MD  3/13/2022  10:58 AM

## 2022-03-14 ENCOUNTER — ANESTHESIA EVENT (OUTPATIENT)
Dept: ENDOSCOPY | Age: 72
DRG: 377 | End: 2022-03-14
Payer: OTHER GOVERNMENT

## 2022-03-14 ENCOUNTER — APPOINTMENT (OUTPATIENT)
Dept: NON INVASIVE DIAGNOSTICS | Age: 72
DRG: 377 | End: 2022-03-14
Payer: OTHER GOVERNMENT

## 2022-03-14 LAB
ANION GAP SERPL CALCULATED.3IONS-SCNC: 7 MMOL/L (ref 9–17)
BUN BLDV-MCNC: 60 MG/DL (ref 8–23)
CALCIUM SERPL-MCNC: 8.5 MG/DL (ref 8.6–10.4)
CHLORIDE BLD-SCNC: 96 MMOL/L (ref 98–107)
CO2: 37 MMOL/L (ref 20–31)
CREAT SERPL-MCNC: 1.35 MG/DL (ref 0.5–0.9)
GFR AFRICAN AMERICAN: 47 ML/MIN
GFR NON-AFRICAN AMERICAN: 39 ML/MIN
GFR SERPL CREATININE-BSD FRML MDRD: ABNORMAL ML/MIN/{1.73_M2}
GLUCOSE BLD-MCNC: 108 MG/DL (ref 65–105)
GLUCOSE BLD-MCNC: 114 MG/DL (ref 65–105)
GLUCOSE BLD-MCNC: 87 MG/DL (ref 65–105)
GLUCOSE BLD-MCNC: 93 MG/DL (ref 70–99)
GLUCOSE BLD-MCNC: 97 MG/DL (ref 65–105)
HCT VFR BLD CALC: 28 % (ref 36–46)
HEMOGLOBIN: 9 G/DL (ref 12–16)
MAGNESIUM: 1.9 MG/DL (ref 1.6–2.6)
MCH RBC QN AUTO: 29.9 PG (ref 26–34)
MCHC RBC AUTO-ENTMCNC: 32 G/DL (ref 31–37)
MCV RBC AUTO: 93.3 FL (ref 80–100)
PDW BLD-RTO: 17.7 % (ref 11.5–14.9)
PLATELET # BLD: 246 K/UL (ref 150–450)
PMV BLD AUTO: 7.9 FL (ref 6–12)
POTASSIUM SERPL-SCNC: 3.8 MMOL/L (ref 3.7–5.3)
RBC # BLD: 3.01 M/UL (ref 4–5.2)
SODIUM BLD-SCNC: 140 MMOL/L (ref 135–144)
WBC # BLD: 5.2 K/UL (ref 3.5–11)

## 2022-03-14 PROCEDURE — 2500000003 HC RX 250 WO HCPCS: Performed by: INTERNAL MEDICINE

## 2022-03-14 PROCEDURE — 2580000003 HC RX 258: Performed by: INTERNAL MEDICINE

## 2022-03-14 PROCEDURE — 6370000000 HC RX 637 (ALT 250 FOR IP): Performed by: INTERNAL MEDICINE

## 2022-03-14 PROCEDURE — 2700000000 HC OXYGEN THERAPY PER DAY

## 2022-03-14 PROCEDURE — 85027 COMPLETE CBC AUTOMATED: CPT

## 2022-03-14 PROCEDURE — 94640 AIRWAY INHALATION TREATMENT: CPT

## 2022-03-14 PROCEDURE — 6360000002 HC RX W HCPCS: Performed by: INTERNAL MEDICINE

## 2022-03-14 PROCEDURE — 97535 SELF CARE MNGMENT TRAINING: CPT

## 2022-03-14 PROCEDURE — C9113 INJ PANTOPRAZOLE SODIUM, VIA: HCPCS | Performed by: INTERNAL MEDICINE

## 2022-03-14 PROCEDURE — 6370000000 HC RX 637 (ALT 250 FOR IP)

## 2022-03-14 PROCEDURE — 82947 ASSAY GLUCOSE BLOOD QUANT: CPT

## 2022-03-14 PROCEDURE — 83735 ASSAY OF MAGNESIUM: CPT

## 2022-03-14 PROCEDURE — 93308 TTE F-UP OR LMTD: CPT

## 2022-03-14 PROCEDURE — 80048 BASIC METABOLIC PNL TOTAL CA: CPT

## 2022-03-14 PROCEDURE — 1200000000 HC SEMI PRIVATE

## 2022-03-14 PROCEDURE — 94761 N-INVAS EAR/PLS OXIMETRY MLT: CPT

## 2022-03-14 PROCEDURE — 97530 THERAPEUTIC ACTIVITIES: CPT

## 2022-03-14 PROCEDURE — 6360000002 HC RX W HCPCS: Performed by: NURSE PRACTITIONER

## 2022-03-14 PROCEDURE — 36415 COLL VENOUS BLD VENIPUNCTURE: CPT

## 2022-03-14 PROCEDURE — 97116 GAIT TRAINING THERAPY: CPT

## 2022-03-14 PROCEDURE — 99232 SBSQ HOSP IP/OBS MODERATE 35: CPT | Performed by: INTERNAL MEDICINE

## 2022-03-14 PROCEDURE — 6370000000 HC RX 637 (ALT 250 FOR IP): Performed by: NURSE PRACTITIONER

## 2022-03-14 PROCEDURE — 97110 THERAPEUTIC EXERCISES: CPT

## 2022-03-14 PROCEDURE — APPSS30 APP SPLIT SHARED TIME 16-30 MINUTES: Performed by: NURSE PRACTITIONER

## 2022-03-14 RX ORDER — ONDANSETRON 2 MG/ML
4 INJECTION INTRAMUSCULAR; INTRAVENOUS ONCE
Status: COMPLETED | OUTPATIENT
Start: 2022-03-14 | End: 2022-03-14

## 2022-03-14 RX ADMIN — IPRATROPIUM BROMIDE AND ALBUTEROL SULFATE 1 AMPULE: 2.5; .5 SOLUTION RESPIRATORY (INHALATION) at 19:39

## 2022-03-14 RX ADMIN — ACETAMINOPHEN 650 MG: 325 TABLET, FILM COATED ORAL at 16:59

## 2022-03-14 RX ADMIN — BENZONATATE 200 MG: 200 CAPSULE ORAL at 22:36

## 2022-03-14 RX ADMIN — METOLAZONE 5 MG: 2.5 TABLET ORAL at 08:14

## 2022-03-14 RX ADMIN — METOPROLOL TARTRATE 50 MG: 50 TABLET, FILM COATED ORAL at 21:02

## 2022-03-14 RX ADMIN — MEROPENEM 1000 MG: 1 INJECTION, POWDER, FOR SOLUTION INTRAVENOUS at 10:43

## 2022-03-14 RX ADMIN — MULTIPLE VITAMINS W/ MINERALS TAB 1 TABLET: TAB at 08:14

## 2022-03-14 RX ADMIN — MAGNESIUM OXIDE TAB 400 MG (241.3 MG ELEMENTAL MG) 400 MG: 400 (241.3 MG) TAB at 21:02

## 2022-03-14 RX ADMIN — BISACODYL 10 MG: 5 TABLET, COATED ORAL at 11:51

## 2022-03-14 RX ADMIN — METOPROLOL TARTRATE 50 MG: 50 TABLET, FILM COATED ORAL at 08:15

## 2022-03-14 RX ADMIN — SODIUM CHLORIDE, PRESERVATIVE FREE 10 ML: 5 INJECTION INTRAVENOUS at 21:03

## 2022-03-14 RX ADMIN — BUMETANIDE 2 MG: 0.25 INJECTION, SOLUTION INTRAMUSCULAR; INTRAVENOUS at 08:15

## 2022-03-14 RX ADMIN — ALLOPURINOL 300 MG: 300 TABLET ORAL at 08:15

## 2022-03-14 RX ADMIN — ANTI-FUNGAL POWDER MICONAZOLE NITRATE TALC FREE: 1.42 POWDER TOPICAL at 08:18

## 2022-03-14 RX ADMIN — BUMETANIDE 2 MG: 0.25 INJECTION, SOLUTION INTRAMUSCULAR; INTRAVENOUS at 14:07

## 2022-03-14 RX ADMIN — BUMETANIDE 2 MG: 0.25 INJECTION, SOLUTION INTRAMUSCULAR; INTRAVENOUS at 21:03

## 2022-03-14 RX ADMIN — AMIODARONE HYDROCHLORIDE 200 MG: 200 TABLET ORAL at 08:15

## 2022-03-14 RX ADMIN — ANTI-FUNGAL POWDER MICONAZOLE NITRATE TALC FREE: 1.42 POWDER TOPICAL at 21:02

## 2022-03-14 RX ADMIN — Medication 60 MG: at 22:36

## 2022-03-14 RX ADMIN — IPRATROPIUM BROMIDE AND ALBUTEROL SULFATE 1 AMPULE: 2.5; .5 SOLUTION RESPIRATORY (INHALATION) at 23:01

## 2022-03-14 RX ADMIN — MAGNESIUM OXIDE TAB 400 MG (241.3 MG ELEMENTAL MG) 400 MG: 400 (241.3 MG) TAB at 08:15

## 2022-03-14 RX ADMIN — IPRATROPIUM BROMIDE AND ALBUTEROL SULFATE 1 AMPULE: 2.5; .5 SOLUTION RESPIRATORY (INHALATION) at 15:02

## 2022-03-14 RX ADMIN — ATORVASTATIN CALCIUM 40 MG: 40 TABLET, FILM COATED ORAL at 08:15

## 2022-03-14 RX ADMIN — SODIUM CHLORIDE, PRESERVATIVE FREE 10 ML: 5 INJECTION INTRAVENOUS at 08:19

## 2022-03-14 RX ADMIN — PANTOPRAZOLE SODIUM 40 MG: 40 INJECTION, POWDER, FOR SOLUTION INTRAVENOUS at 08:16

## 2022-03-14 RX ADMIN — IPRATROPIUM BROMIDE AND ALBUTEROL SULFATE 1 AMPULE: 2.5; .5 SOLUTION RESPIRATORY (INHALATION) at 10:44

## 2022-03-14 RX ADMIN — IPRATROPIUM BROMIDE AND ALBUTEROL SULFATE 1 AMPULE: 2.5; .5 SOLUTION RESPIRATORY (INHALATION) at 06:56

## 2022-03-14 RX ADMIN — ONDANSETRON 4 MG: 2 INJECTION INTRAMUSCULAR; INTRAVENOUS at 11:51

## 2022-03-14 RX ADMIN — ACETAMINOPHEN 650 MG: 325 TABLET, FILM COATED ORAL at 08:15

## 2022-03-14 RX ADMIN — POLYETHYLENE GLYCOL 3350, SODIUM SULFATE ANHYDROUS, SODIUM BICARBONATE, SODIUM CHLORIDE, POTASSIUM CHLORIDE 4000 ML: 236; 22.74; 6.74; 5.86; 2.97 POWDER, FOR SOLUTION ORAL at 15:08

## 2022-03-14 ASSESSMENT — PAIN SCALES - GENERAL
PAINLEVEL_OUTOF10: 4
PAINLEVEL_OUTOF10: 0
PAINLEVEL_OUTOF10: 3
PAINLEVEL_OUTOF10: 3

## 2022-03-14 ASSESSMENT — PAIN SCALES - WONG BAKER
WONGBAKER_NUMERICALRESPONSE: 4

## 2022-03-14 ASSESSMENT — PAIN DESCRIPTION - LOCATION
LOCATION: HEAD

## 2022-03-14 ASSESSMENT — PAIN DESCRIPTION - PAIN TYPE
TYPE: ACUTE PAIN
TYPE: ACUTE PAIN

## 2022-03-14 NOTE — PLAN OF CARE
Nutrition Problem #1: Mild malnutrition  Intervention: Food and/or Nutrient Delivery: Modify Current Diet,Start Oral Nutrition Supplement  Nutritional Goals: Meet estimated nutrient needs

## 2022-03-14 NOTE — PROGRESS NOTES
Gabriel Yusuf MD/Rahul Abdullahi MD/ Emmanuel Oneil MD/Dr Barbara Ash APRN AGACNP-BC, NP-C      Pk Wells APRN NP-C     Chica Krueger APRN NP-C                                           Pulmonary Progress Note    Patient - Charlie Espino   Age - 66 y.o.   - 1950  MRN - [id number]  Acct # - [de-identified]  Date of Admission - 3/1/2022  9:47 PM    Consulting Service/Physician:       Primary Care Physician: Nic Briones MD    SUBJECTIVE:     Chief Complaint:   Chief Complaint   Patient presents with    Fatigue     Subjective:    [address] is currently sitting up in chair on 1 L nasal cannula. She denies any complaints. She continues on IV meropenem, Bumex 3 times a day. She is on 1 L nasal cannula pulse ox 97%. She is afebrile. Note plans for EGD and colonoscopy tomorrow. She does not wear oxygen at home. Daughters are at bedside. Creatinine today was 1.35.  CO2 is trending up and was 37. VITALS  BP (!) 142/60   Pulse 83   Temp 97.9 °F (36.6 °C) (Oral)   Resp 18   Ht 4' 11\" (1.499 m)   Wt 132 lb 0.9 oz (59.9 kg)   SpO2 95%   BMI 26.67 kg/m²   Wt Readings from Last 3 Encounters:   22 132 lb 0.9 oz (59.9 kg)   22 156 lb 1.4 oz (70.8 kg)   22 143 lb 6.4 oz (65 kg)     I/O (24 Hours)    Intake/Output Summary (Last 24 hours) at 3/14/2022 1034  Last data filed at 3/14/2022 0504  Gross per 24 hour   Intake 780 ml   Output 650 ml   Net 130 ml     Ventilator:   Settings  FiO2 : 24 %  Insp Rise Time (%): 1 %  Exam:   Physical Exam   Constitutional: Elderly female sitting up in bed on 1 L nasal cannula no distress  HENT: Unremarkable  Head: Normocephalic and atraumatic. Eyes: EOM are normal. Pupils are equal, round, and reactive to light. Neck: Neck supple. Cardiovascular:  Regular rate and rhythm. Normal heart tones. No JVD.     Pulmonary/Chest: Diminished in bases, respirations even and unlabored, on 1 L nasal cannula pulse ox 97%   Abdominal: Soft. Rounded, bowel sounds are normal. There is no tenderness. Musculoskeletal: Normal range of motion. Neurological: Patient is alert and oriented to person, place, and time. Skin: Skin is warm and dry. No rash noted.    Extremities: +1 pedal edema with venous stasis changes i  Infusions:      sodium chloride 25 mL (03/12/22 1048)    sodium chloride       Meds:     Current Facility-Administered Medications:     [Held by provider] apixaban (ELIQUIS) tablet 5 mg, 5 mg, Oral, BID, Jann Ad, DO, 5 mg at 03/13/22 1127    benzonatate (TESSALON) capsule 200 mg, 200 mg, Oral, TID PRN, Coleman Gimenez MD    dextromethorphan [facility]) 30 MG/5ML extended release liquid 60 mg, 60 mg, Oral, Q12H PRN, Coleman Gimenez MD, 60 mg at 03/13/22 1128    amiodarone (CORDARONE) tablet 200 mg, 200 mg, Oral, Daily, Jann Ad, DO, 200 mg at 03/14/22 0815    metoprolol tartrate (LOPRESSOR) tablet 50 mg, 50 mg, Oral, BID, Shante Hayward DO, 50 mg at 03/14/22 0815    bumetanide (BUMEX) injection 2 mg, 2 mg, IntraVENous, TID, Delgado Noyola MD, 2 mg at 03/14/22 0815    [COMPLETED] meropenem (MERREM) 2,000 mg in sodium chloride 0.9 % 100 mL IVPB, 2,000 mg, IntraVENous, Once, Stopped at 03/07/22 1553 **FOLLOWED BY** meropenem (MERREM) 1,000 mg in sodium chloride 0.9 % 100 mL IVPB (mini-bag), 1,000 mg, IntraVENous, Q12H, Coleman Gimenez MD, Stopped at 03/14/22 0131    ipratropium-albuterol (DUONEB) nebulizer solution 1 ampule, 1 ampule, Inhalation, Q4H, Coleman Gimenez MD, 1 ampule at 03/14/22 0656    sodium chloride flush 0.9 % injection 5-40 mL, 5-40 mL, IntraVENous, 2 times per day, Live Sanders MD, 10 mL at 03/14/22 0819    sodium chloride flush 0.9 % injection 10 mL, 10 mL, IntraVENous, PRN, Andrea Connor MD    0.9 % sodium chloride infusion, 25 mL, IntraVENous, PRN, Live Sanders MD, Last Rate: 100 mL/hr at 03/12/22 1048, 25 mL at 03/12/22 1048    ondansetron (ZOFRAN-ODT) disintegrating tablet 4 mg, 4 mg, Oral, Q8H PRN **OR** ondansetron (ZOFRAN) injection 4 mg, 4 mg, IntraVENous, Q6H PRN, Ava Alex MD    magnesium hydroxide (MILK OF MAGNESIA) 400 MG/5ML suspension 30 mL, 30 mL, Oral, Daily PRN, Ava Alex MD    acetaminophen (TYLENOL) tablet 650 mg, 650 mg, Oral, Q6H PRN, 650 mg at 03/14/22 0815 **OR** acetaminophen (TYLENOL) suppository 650 mg, 650 mg, Rectal, Q6H PRN, Ava Alex MD    allopurinol (ZYLOPRIM) tablet 300 mg, 300 mg, Oral, Daily, Andrea Connor MD, 300 mg at 03/14/22 0815    atorvastatin (LIPITOR) tablet 40 mg, 40 mg, Oral, Daily, Andrea Connor MD, 40 mg at 03/14/22 0815    magnesium oxide (MAG-OX) tablet 400 mg, 400 mg, Oral, BID, Andrea Connor MD, 400 mg at 03/14/22 0815    miconazole (MICOTIN) 2 % powder, , Topical, BID, Ava Alex MD, Given at 03/14/22 0818    therapeutic multivitamin-minerals 1 tablet, 1 tablet, Oral, Daily, Andrea Connor MD, 1 tablet at 03/14/22 0814    pantoprazole (PROTONIX) injection 40 mg, 40 mg, IntraVENous, Daily, 40 mg at 03/14/22 0816 **AND** sodium chloride flush 0.9 % injection 10 mL, 10 mL, IntraVENous, Daily, Andrea Connor MD, 10 mL at 03/14/22 0819    0.9 % sodium chloride infusion, , IntraVENous, PRN, Ava Alex MD    Lab Results:     Lab Results   Component Value Date    WBC 5.2 03/14/2022    HGB 9.0 (L) 03/14/2022    HCT 28.0 (L) 03/14/2022    MCV 93.3 03/14/2022     03/14/2022     Lab Results   Component Value Date    CALCIUM 8.5 (L) 03/14/2022     03/14/2022    K 3.8 03/14/2022    CO2 37 (H) 03/14/2022    CL 96 (L) 03/14/2022    BUN 60 (H) 03/14/2022    CREATININE 1.35 (H) 03/14/2022       Lab Results   Component Value Date    INR 1.3 02/06/2022    PROTIME 16.0 (H) 02/06/2022       Radiology:       ASSESSMENT:       Acute respiratory failure, hypoxic and hypercapnic, intubated 3/7; extubated 3/9, currently on 1 L  Acute on chronic

## 2022-03-14 NOTE — PROGRESS NOTES
Physical Therapy  Facility/Department: [facility] MED SURG  Daily Treatment Note  NAME: Charlie Espino  :   MRN: [id number]    Date of Service: 3/14/2022    Discharge Recommendations:  Patient would benefit from continued therapy after discharge   PT Equipment Recommendations  Equipment Needed: Yes  Other: BSC    Assessment   Body structures, Functions, Activity limitations: Decreased functional mobility ; Decreased strength;Decreased endurance;Decreased balance  Assessment: Impaired mobility due to decreased tolerance to activity and dizziness. Pt would benefit from additional PT to increase strength, endurance, and safety with functional mobility. Decision Making: Medium Complexity  History: CVA  Exam: decreased strength, balance, endurance  Clinical Presentation: evolving  REQUIRES PT FOLLOW UP: Yes  Activity Tolerance  Activity Tolerance: Patient limited by endurance     Patient Diagnosis(es): The encounter diagnosis was Symptomatic anemia. has a past medical history of Acute CVA (cerebrovascular accident) ([address] Altered mental status, Arthritis, Brain mass, Cellulitis and abscess, Cellulitis and abscess of unspecified site, Cellulitis of both lower extremities, Cellulitis of left lower extremity, Cellulitis of lower extremity, CHF (congestive heart failure) ([address] Chronic kidney disease, unspecified, Coronary atherosclerosis of native coronary artery, Essential hypertension, Essential hypertension, malignant, Hallucinations, Hard of hearing, Head contusion, Hypokalemia, Iron deficiency anemia, unspecified, Meningioma ([address] Myocardial infarction ([address] Other and unspecified hyperlipidemia, Pure hypercholesterolemia, Toxic metabolic encephalopathy, Type II or unspecified type diabetes mellitus without mention of complication, not stated as uncontrolled, Unspecified asthma(493.90), Unspecified venous (peripheral) insufficiency, Unspecified vitamin D deficiency, and UTI (urinary tract infection).    has a past surgical history that includes Tonsillectomy and adenoidectomy; Coronary artery bypass graft; intubation (3/7/2022); and Thoracentesis (3/10/2022). Restrictions  Restrictions/Precautions  Restrictions/Precautions: Fall Risk  Subjective   General  Family / Caregiver Present: Yes  Subjective  Subjective: Pt is in bed upon arrival. Pt states she amb to restroom x2 last night with nursing assistance. Pt's daughters are present during tx. They state pt lives with her son, however he works . Pt's daugther's are working on a plan to have someone stay with pt while the son is working, so she can have 24/7 A. General Comment   Comments: pt is [place]. RN Mayi Jarrell's pt for PT. Pain Screening  Patient Currently in Pain: Yes  Pain Assessment  Pain Assessment: Faces  Kessler-Baker Pain Rating: Hurts little more  Pain Location: Head  Non-Pharmaceutical Pain Intervention(s): Ambulation/Increased Activity; Distraction;Repositioned (Warm washcloth per pt request to pt's forehead. )  Vital Signs  BP Location: Left upper arm  Level of Consciousness: Alert (0)  Patient Currently in Pain: Yes  Oxygen Therapy  O2 Device: Nasal cannula  O2 Flow Rate (L/min): 1 L/min       Orientation  Orientation  Overall Orientation Status: Within Functional Limits  Objective   Bed mobility  Supine to Sit: Stand by assistance  Scooting: Stand by assistance  Comment: HOB elevated. PT is able to utilize bed rails to complete supine to sit transfer. Pt's sitting balance is good this date, SBA for safety. Transfers  Sit to Stand: Minimal Assistance;Contact guard assistance;2 Person Assistance  Stand to sit: Contact guard assistance  Comment: Pt requires CGA to complete sit to stand from EOB and Min A x2 from chair. Pt demos good hand placement for all transfers.    Ambulation  Ambulation?: Yes  Ambulation 1  Surface: level tile  Device: Rolling Walker  Other Apparatus: O2 (1)  Assistance: Contact guard assistance  Quality of Gait: Pt has a kyphotic posture, however able to complete an upward gaze with cues. Pt demos decrease mayito, decrease step length. Pt reports increase dizziness and request to sit down prior to amb to the chair. Pt is able to amb to chair with encouragement due to safety concerns (No chair follow during amb). Pt's SPO2 is 92% and HR is 82. Pt's SPO2 increases to 95% with seated rest break. Gait Deviations: Slow Mayito;Decreased step length;Decreased step height  Distance: 12'x1  Comments: Pt amb from bed to opposite side of bed. Pt's daughter reports \"She has to walk farther than this to the bathroom at home\". Pt's daughter reports she does not have a BSC at home. Stairs/Curb  Stairs?: Yes  Stairs  # Steps : 2  Stairs Height: 6\"  Device: Rolling walker  Other Apparatus:  (O2)  Assistance: Minimal assistance;2 Person assistance  Comment: Pt and pt's daughter is edu on proper stair technique this date. Pt is able to complete x1 step up using RW and Min A x2. Pt denies dizziness, however request a seated rest break. Pt is agreeable to complete a second step up, however this time reports dizziness. Pt edu on posture and breathing technique, with fair carryover. Balance  Posture: Poor (Kyphotic posture. )  Sitting - Static: Fair;+ (SBA)  Sitting - Dynamic: Fair (CGA)  Standing - Static: Fair (CGA)  Standing - Dynamic: Fair;- (Mehdi)  Comments: standing balance assessed with RW. Other exercises  Other exercises?: Yes  Other exercises 1: seated AROM B LE x 15 each with OTB for light resistance. Edu on proper technique to pt's daughter, with good understanding at this time. HEP given this date. Other exercises 2: STS x1 from EOB and x2 from chair. Other exercises 3: Edu on safety awareness, transfer techniques, pacing skills with HEP and functional mobility, stair training, and proper use and donning of gait belt to pt's daughter.  Pt is left in chair with her daughter in room, call light in reach, RN [facility] informed, and all other needs addressed. Other exercises 4: Edu provided on the benefits of OOB activities with good understanding at this time. Comment: rest breaks PRN. Monitor SPO2 and HR. Pt is limited due to fatigue and dizziness.             Goals  Short term goals  Time Frame for Short term goals: 5-7 days  Short term goal 1: bed mobility with Mehdi  Short term goal 2: standing with RW and CGA x 2min for ease of ADLs  Short term goal 3: transfers to/from bed/chair with RW and Mehdi  Short term goal 4: negotiate 3 steps with rail/CGA    Plan    Plan  Times per week: 6-7x/wk  Current Treatment Recommendations: Strengthening,Balance Training,Endurance Training,Functional Mobility Training,Transfer Training  Safety Devices  Type of devices:Call light within reach,Left in chair, RN Yousuf Israel informed (family present)     Therapy Time   Individual Concurrent Group Co-treatment   Time In 0937         Time Out 1026         Minutes [address] [place] [address]

## 2022-03-14 NOTE — PLAN OF CARE
Problem: Skin Integrity:  Goal: Will show no infection signs and symptoms  Description: Will show no infection signs and symptoms  Outcome: Ongoing  Goal: Absence of new skin breakdown  Description: Absence of new skin breakdown  Outcome: Ongoing     Problem: Falls - Risk of:  Goal: Will remain free from falls  Description: Will remain free from falls  Outcome: Ongoing  Goal: Absence of physical injury  Description: Absence of physical injury  Outcome: Ongoing     Problem: Nutrition  Goal: Optimal nutrition therapy  Outcome: Ongoing     Problem: Pain:  Goal: Pain level will decrease  Description: Pain level will decrease  Outcome: Ongoing  Goal: Control of acute pain  Description: Control of acute pain  Outcome: Ongoing  Goal: Control of chronic pain  Description: Control of chronic pain  Outcome: Ongoing     Problem: OXYGENATION/RESPIRATORY FUNCTION  Goal: Patient will maintain patent airway  Outcome: Ongoing  Goal: Patient will achieve/maintain normal respiratory rate/effort  Description: Respiratory rate and effort will be within normal limits for the patient  Outcome: Ongoing

## 2022-03-14 NOTE — PROGRESS NOTES
[facility] Internal Medicine    Progress Note    3/14/2022    4:57 PM    Name:   Donal Hoffman  MRN:     [id number]     Acct:      [de-identified]   Room:   2047/2047-01  IP Day:  12  Admit Date:  3/1/2022  9:47 PM    PCP:   Ortega Garcia MD  Code Status:  Full Code    Subjective:     C/C:   Chief Complaint   Patient presents with    Fatigue         HPI:     See HPI    Review of Systems:   Positive for shortness of breath no new cough  Denies chest pain or palpitations  Denies abdominal pain, diarrhea vomiting  Denies any new numbness tremors or weakness. Medications: Allergies:     Allergies   Allergen Reactions    Latex Rash    Aleve [Naproxen Sodium]      Chronic kidney disease stage III, CHF    Pioglitazone Other (See Comments)     Congestive heart failure    Claritin [Loratadine]     Keflex [Cephalexin]     Lisinopril      Needs clarification of contraindication       Current Meds:   Scheduled Meds:    [Held by provider] apixaban  5 mg Oral BID    amiodarone  200 mg Oral Daily    metoprolol tartrate  50 mg Oral BID    bumetanide  2 mg IntraVENous TID    meropenem  1,000 mg IntraVENous Q12H    ipratropium-albuterol  1 ampule Inhalation Q4H    sodium chloride flush  5-40 mL IntraVENous 2 times per day    allopurinol  300 mg Oral Daily    atorvastatin  40 mg Oral Daily    magnesium oxide  400 mg Oral BID    miconazole   Topical BID    therapeutic multivitamin-minerals  1 tablet Oral Daily    pantoprazole  40 mg IntraVENous Daily    And    sodium chloride flush  10 mL IntraVENous Daily     Continuous Infusions:    sodium chloride 25 mL (03/12/22 1048)    sodium chloride       PRN Meds: benzonatate, dextromethorphan, sodium chloride flush, sodium chloride, ondansetron **OR** ondansetron, magnesium hydroxide, acetaminophen **OR** acetaminophen, sodium chloride    Data:     Past Medical History:   has a past medical history of Acute CVA (cerebrovascular accident) ([facility] [address] Altered mental status, Arthritis, Brain mass, Cellulitis and abscess, Cellulitis and abscess of unspecified site, Cellulitis of both lower extremities, Cellulitis of left lower extremity, Cellulitis of lower extremity, CHF (congestive heart failure) ([facility] [address] Chronic kidney disease, unspecified, Coronary atherosclerosis of native coronary artery, Essential hypertension, Essential hypertension, malignant, Hallucinations, Hard of hearing, Head contusion, Hypokalemia, Iron deficiency anemia, unspecified, Meningioma ([facility] [address] Myocardial infarction ([facility] [address] Other and unspecified hyperlipidemia, Pure hypercholesterolemia, Toxic metabolic encephalopathy, Type II or unspecified type diabetes mellitus without mention of complication, not stated as uncontrolled, Unspecified asthma(493.90), Unspecified venous (peripheral) insufficiency, Unspecified vitamin D deficiency, and UTI (urinary tract infection). Social History:   reports that she quit smoking about 22 years ago. She has a 2.50 pack-year smoking history. She has never used smokeless tobacco. She reports previous alcohol use. She reports that she does not use drugs. Family History:   Family History   Problem Relation Age of Onset    Diabetes Mother     Heart Disease Mother     Heart Disease Father     Diabetes Sister     High Blood Pressure Sister     Diabetes Maternal Grandmother        Vitals:  BP (!) 142/60   Pulse 83   Temp 97.9 °F (36.6 °C) (Oral)   Resp 16   Ht 4' 11\" (1.499 m)   Wt 132 lb 0.9 oz (59.9 kg)   SpO2 97%   BMI 26.67 kg/m²   Temp (24hrs), Av.2 °F (36.8 °C), Min:97.9 °F (36.6 °C), Max:98.6 °F (37 °C)    Recent Labs     22  2015 22  0618 22  1128 22  1628   POCGLU 155* 87 114* 108*       I/O (24Hr):     Intake/Output Summary (Last 24 hours) at 3/14/2022 1657  Last data filed at 3/14/2022 0504  Gross per 24 hour   Intake 330 ml   Output 450 ml   Net -120 ml       Labs:    Lab Results Component Value Date    WBC 5.2 03/14/2022    HGB 9.0 (L) 03/14/2022    HCT 28.0 (L) 03/14/2022    MCV 93.3 03/14/2022     03/14/2022     Lab Results   Component Value Date     03/14/2022    K 3.8 03/14/2022    CL 96 03/14/2022    CO2 37 03/14/2022    BUN 60 03/14/2022    CREATININE 1.35 03/14/2022    GLUCOSE 93 03/14/2022    CALCIUM 8.5 03/14/2022          Lab Results   Component Value Date/Time    SPECIAL RIGHT 03/10/2022 11:00 AM     Lab Results   Component Value Date/Time    CULTURE NO GROWTH 4 DAYS 03/10/2022 11:00 AM         Radiology:    Recent data reviewed    Physical Examination:        General appearance:  alert, cooperative and no distress  Eyes: Anicteric sclera. Pupils are equally round and reactive to light. Extraocular movements are intact. Lungs:  B/l reduced air entry, bilateral crackles.    Heart:  regular rate and rhythm, no murmur  Abdomen:  soft, nontender, nondistended, normal bowel sounds, no masses, hepatomegaly, splenomegaly  Extremities:  no edema, redness, tenderness in the calves  Skin:  no gross lesions, rashes, induration  Neuro:  Alert, oriented X 3, no new focal weakness  Assessment:        Primary Problem  Acute GI bleeding    Active Hospital Problems    Diagnosis Date Noted    Mild malnutrition ([facility] [address] [E44.1] 03/08/2022    Symptomatic anemia [D64.9]     Family history of colon cancer [Z80.0]     Family history of pancreatic cancer [Z80.0]     Elevated LFTs [R79.89] 02/25/2021    Paroxysmal atrial fibrillation ([facility] [address] [I48.0] 07/28/2020    Stage 3 chronic kidney disease ([facility] [address] [N18.30]            DVT prophylaxis: Mechanical  Antibiotics: Day 7 of Merrem-discontinued today    Plan:        67-year-old lady with history of atrial fibrillation history of coronary disease stent placed 20 years ago on Eliquis and Plavix 75 baseline hemoglobin was 10 2 weeks ago admitted with gradual increase of fatigue dyspnea exertion sleepy most part of the day no fever no vomiting blood no blood in the stool no dark stools no diarrhea hemoglobin ER was 4.4 suspected GI bleed Eliquis and Plavix held n.p.o. for now upper and lower endoscopies GI consulted  IV Venofer 200 mg daily  1 unit of PRBC given hemoglobin improved to 6 will need another unit of PRBC  Ckd,nephro consult,pt was due to see dr Jacoby Lacy for ckd    Needs EGD but has been difficult to obtain due to delirium, CO2 narcosis, respiratory acidosis, CHF exacerbation  Cardiology nephrology pulmonology following    3/7  Was moved out from ICU last night  Did not wear BiPAP overnight, chest x-ray worsening with bilateral effusions-we will await blood recommendation regarding thoracentesis  ABG did not show hypoxia hypercapnia  Was also started on Seroquel yesterday for hallucination anxiety and insomnia-suspect residual sedation from this; will reevaluate later today  EGD canceled for today      3/11  Patient was extubated on 3/9, mental status back to baseline respiration improved, transferred to floor on 3/10  Underwent thoracentesis on the right on 3/ ml out  Continues to be diuresed with IV Bumex 2mg tid  Has bilateral crackes, cough- needs to continue iv diuresis  CXR tomorrow  EGD will be done outpatiet    3/12  Overall doing well, oxygen requirements reduced to 1.5 L by nasal cannula  Continues on IV Bumex diuresis  Creatinine improving  Hemoglobin better today at 8.9  Repeat echo ordered by cardiology and pending    3/13  Patient continues diuresis, metolazone added to her diuretics, creatinine continues to improve  Hemoglobin 9 today has been uptrending; Eliquis has been resumed today will monitor H&H  Patient appears brighter, in no distress, doing well on 1 L nasal cannula oxygen  Repeat echo to be done tomorrow  Plan discussed with daughter Laura Lomax in room-if H&H is stable tomorrow we can plan on discharging patient back to home tomorrow  Okay to move to [facility] today    3/14  After detailed discussion with daughter and GI team yesterday plan was made to proceed with EGD and colonoscopy tomorrow  Hemoglobin stable  Repeat echo reviewed EF 65%  Patient reports feeling well today, very nervous about procedure tomorrow but willing to proceed denies any pain    Radha Phillips MD  3/14/2022  4:57 PM

## 2022-03-14 NOTE — DISCHARGE INSTR - COC
Continuity of Care Form    Patient Name: Meet Butler   :  [id number]  MRN:  [id number]    Admit date:  3/1/2022  Discharge date:  ***    Code Status Order: Full Code   Advance Directives:      Admitting Physician:  Izabel Causey MD  PCP: Isabel Robertson MD    Discharging Nurse: [facility] Unit/Room#: 2047/2047-01  Discharging Unit Phone Number: ***    Emergency Contact:   Extended Emergency Contact Information  Primary Emergency Contact: Luisito [address] Phone: [phone]  Relation: Child  Secondary Emergency Contact: CYRIL NELSON  Mobile Phone: [phone]  Relation: Child  Preferred language: English    Past Surgical History:  Past Surgical History:   Procedure Laterality Date    CORONARY ARTERY BYPASS GRAFT      x 3, Dr. Lexy Clement  3/7/2022         THORACENTESIS  3/10/2022         TONSILLECTOMY AND ADENOIDECTOMY         Immunization History: There is no immunization history on file for this patient. Active Problems:  Patient Active Problem List   Diagnosis Code    Vitamin D deficiency E55.9    Venous insufficiency of both lower extremities I87.2    Asthma J45.909    Type 2 diabetes mellitus with stage 3 chronic kidney disease, without long-term current use of insulin ([facility]) E11.22, N18.30    Coronary artery disease involving native coronary artery of native heart without angina pectoris I25.10    Iron deficiency anemia D50.9    Stage 3 chronic kidney disease ([facility]) N18.30    Colonoscopy refused Z53.20    Pneumococcal vaccination declined Z28.21    Impaired hearing H91.90    Chronic diastolic CHF (congestive heart failure) ([facility]) I50.32    COPD (chronic obstructive pulmonary disease) ([facility]) J44.9    HTN. Benign hypertension with CKD (chronic kidney disease) stage III ([facility]) I12.9, N18.30    Gout with tophi M1A. 9XX1    Hyperlipidemia with target LDL less than 70 E78.5    Dry skin dermatitis HANDS L85.3    Meningioma, cerebral ([facility]) D32.0    Paroxysmal atrial fibrillation ([facility] [address] I48.0    Influenza vaccination declined Z28.21    Recurrent UTI N39.0    Elevated LFTs R79.89    Hypomagnesemia E83.42    Kidney stones N20.0    Diverticulosis K57.90    COVID-19 vaccination declined Z28.21    Acute kidney injury ([facility] [address] N17.9    Acute on chronic combined systolic (congestive) and diastolic (congestive) heart failure (HCC) I50.43    Chronic a-fib (HCC) I48.20    Chronic venous stasis dermatitis of both lower extremities I87.2    Symptomatic anemia D64.9    Family history of colon cancer Z80.0    Family history of pancreatic cancer Z80.0    Mild malnutrition ([facility] [address] E44.1       Isolation/Infection:   Isolation            No Isolation          Patient Infection Status       Infection Onset Added Last Indicated Last Indicated By Review Planned Expiration Resolved Resolved By    None active    Resolved    COVID-19 (Rule Out) 03/01/22 03/01/22 03/01/22 COVID-19 & Influenza Combo (Ordered)   03/01/22 Rule-Out Test Resulted    COVID-19 (Rule Out) 02/06/22 02/06/22 02/06/22 COVID-19 & Influenza Combo (Ordered)   02/06/22 Rule-Out Test Resulted    COVID-19 (Rule Out) 07/25/20 07/25/20 07/25/20 COVID-19 (Ordered)   07/25/20 Rule-Out Test Resulted            Nurse Assessment:  Last Vital Signs: BP (!) 142/60   Pulse 83   Temp 97.9 °F (36.6 °C) (Oral)   Resp 18   Ht 4' 11\" (1.499 m)   Wt 132 lb 0.9 oz (59.9 kg)   SpO2 95%   BMI 26.67 kg/m²     Last documented pain score (0-10 scale): Pain Level: 3  Last Weight:   Wt Readings from Last 1 Encounters:   03/14/22 132 lb 0.9 oz (59.9 kg)     Mental Status:  oriented and alert    IV Access:  - None    Nursing Mobility/ADLs:  Walking   Assisted  Transfer  Assisted  Bathing  Assisted  Dressing  Assisted  Toileting  Assisted  Feeding  Independent  Med Admin  Assisted  Med Delivery   whole    Wound Care Documentation and Therapy:        Elimination:  Continence:    Bowel: Yes  Bladder: Yes  Urinary Catheter: None   Colostomy/Ileostomy/Ileal Conduit: No       Date of Last BM: unknown      Intake/Output Summary (Last 24 hours) at 3/14/2022 0859  Last data filed at 3/14/2022 0504  Gross per 24 hour   Intake 930 ml   Output 650 ml   Net 280 ml     I/O last 3 completed shifts: In: 1050 [P.O.:1050]  Out: 1500 [Urine:1500]    Safety Concerns: At Risk for Falls    Impairments/Disabilities:      Vision and Hearing    Nutrition Therapy:  Current Nutrition Therapy:   - Oral Diet:  General    Routes of Feeding: Oral  Liquids: No Restrictions  Daily Fluid Restriction: no  Last Modified Barium Swallow with Video (Video Swallowing Test): not done    Treatments at the Time of Hospital Discharge:   Respiratory Treatments: duoneb  Oxygen Therapy:  is not on home oxygen therapy. Ventilator:    - No ventilator support    Rehab Therapies: Physical Therapy and Occupational Therapy  Weight Bearing Status/Restrictions: No weight bearing restrictions  Other Medical Equipment (for information only, NOT a DME order):  walker  Other Treatments: Skilled nursing assessment and monitoring. Medication education and monitoring.     Patient's personal belongings (please select all that are sent with patient):  Glasses    RN SIGNATURE:  Electronically signed by Pavan Montalvo RN on 3/14/22 at 9:00 AM EDT    CASE MANAGEMENT/SOCIAL WORK SECTION    Inpatient Status Date: 3/2/2022    Readmission Risk Assessment Score:  Readmission Risk              Risk of Unplanned Readmission:  32           Discharging to [facility]: [phone]  F: [phone]     Dialysis Facility (if applicable)   Name:  Address:  Dialysis Schedule:  Phone:  Fax:    / signature: Electronically signed by Pavan Montalvo RN on 3/14/22 at 9:00 AM EDT    PHYSICIAN SECTION    Prognosis: {Prognosis:2067603394}    Condition at Discharge: [facility] Patient Condition:[id number]}    Rehab Potential (if transferring to Rehab): {Prognosis:6848385455}    Recommended Labs or Other Treatments After Discharge: CBC, BMP in 1 week. Physician Certification: I certify the above information and transfer of Chris Cuevas  is necessary for the continuing treatment of the diagnosis listed and that she requires {Admit to Appropriate Level of Care:41285} for {GREATER/LESS:341214498} 30 days.      Update Admission H&P: {CHP DME Changes in VWJ:159114266}    PHYSICIAN SIGNATURE:  Electronically signed by Kaylene Vargas MD on 3/16/22 at 1:19 PM EDT

## 2022-03-14 NOTE — PLAN OF CARE
Problem: Skin Integrity:  Goal: Will show no infection signs and symptoms  Description: Will show no infection signs and symptoms  3/14/2022 0446 by Aure Carney RN  Outcome: Ongoing  Goal: Absence of new skin breakdown  Description: Absence of new skin breakdown  3/14/2022 1616 by Courtney Rajan RN  Note: Monitor skin surfaces mycostatin under Abd paneous. waffle mattress  3/14/2022 0446 by Aure Carney RN  Outcome: Ongoing     Problem: Falls - Risk of:  Goal: Will remain free from falls  Description: Will remain free from falls  3/14/2022 0446 by Aure Carney RN  Outcome: Ongoing  Goal: Absence of physical injury  Description: Absence of physical injury  3/14/2022 0446 by Aure Carney RN  Outcome: Ongoing     Problem: Nutrition  Goal: Optimal nutrition therapy  3/14/2022 1340 by Nancie Yanes RD, LD  Outcome: Ongoing  3/14/2022 0446 by Aure Carney RN  Outcome: Ongoing     Problem: Pain:  Description: Pain management should include both nonpharmacologic and pharmacologic interventions.   Goal: Pain level will decrease  Description: Pain level will decrease  3/14/2022 0446 by Aure Carney RN  Outcome: Ongoing  Goal: Control of acute pain  Description: Control of acute pain  3/14/2022 0446 by Aure Carney RN  Outcome: Ongoing  Goal: Control of chronic pain  Description: Control of chronic pain  3/14/2022 0446 by Aure Carney RN  Outcome: Ongoing     Problem: OXYGENATION/RESPIRATORY FUNCTION  Goal: Patient will maintain patent airway  3/14/2022 0446 by Aure Carney RN  Outcome: Ongoing  Goal: Patient will achieve/maintain normal respiratory rate/effort  Description: Respiratory rate and effort will be within normal limits for the patient  3/14/2022 0446 by Aure Carney RN  Outcome: Ongoing

## 2022-03-14 NOTE — ANESTHESIA PRE PROCEDURE
Department of Anesthesiology  Preprocedure Note       Name:  Rakan Kwon   Age:  66 y.o.  :  1950                                          MRN:  [id number]         Date:  3/14/2022      Surgeon: Obey Turner):  Carroll Snow MD    Procedure: Procedure(s):  EGD  COLONOSCOPY DIAGNOSTIC    Medications prior to admission:   Prior to Admission medications    Medication Sig Start Date End Date Taking?  Authorizing Provider   vitamin D (ERGOCALCIFEROL) 1.25 MG (18165 UT) CAPS capsule Take 1 capsule by mouth once a week  Patient taking differently: Take 50,000 Units by mouth once a week Sundays 3/1/22  Yes Iris Molina MD   albuterol sulfate HFA (PROAIR HFA) 108 (90 Base) MCG/ACT inhaler Inhale 2 puffs into the lungs every 6 hours as needed for Wheezing or Shortness of Breath (cough) 22  Yes Nat Good MD   apixaban (ELIQUIS) 5 MG TABS tablet Take 1 tablet by mouth 2 times daily 2/15/22  Yes Iris Molina MD   allopurinol (ZYLOPRIM) 300 MG tablet Take 1 tablet by mouth daily Per rheumatologist 2/15/22  Yes Iris Molina MD   amLODIPine (NORVASC) 10 MG tablet Take 1 tablet by mouth daily FOR GOUT 2/15/22  Yes Iris Molina MD   atorvastatin (LIPITOR) 40 MG tablet Take 1 tablet by mouth once daily 2/15/22  Yes Iris Molina MD   bumetanide (BUMEX) 1 MG tablet Take 1 tablet by mouth 2 times daily 2/15/22  Yes Iris Molina MD   chlorhexidine (HIBICLENS) 4 % external liquid for decontamination AND [address] LEGS EVERY DAY 2/15/22  Yes Iris Molina MD   clopidogrel (PLAVIX) 75 MG tablet Take 1 tablet by mouth daily 2/15/22  Yes Iris Molina MD   desloratadine (CLARINEX) 5 MG tablet Take 1 tablet by mouth once daily 2/15/22  Yes Iris Molina MD   magnesium oxide (MAG-OX) 400 (241.3 Mg) MG TABS tablet Take 1 tablet by mouth twice daily 2/15/22  Yes Iris Molina MD   metoprolol tartrate (LOPRESSOR) 50 MG tablet Take 1 tablet by mouth 2 times daily 2/15/22  Yes Samantha Harrell MD   miconazole (MICOTIN) 2 % powder Apply topically 2 times daily UNDER THE SKIN FOLDS LONG TERM 2/15/22  Yes Samantha Harrell MD   mupirocin (BACTROBAN) 2 % cream Apply 2 times dailY X 10 DAYS ON OPEN BLISTERS ON THE LEGS. 2/15/22 3/17/22 Yes Samantha Harrell MD   Multiple Vitamins-Minerals (THERAPEUTIC MULTIVITAMIN-MINERALS) tablet Take 1 tablet by mouth daily 2/15/22  Yes Samantha Harrell MD   colchicine (COLCRYS) 0.6 MG tablet Take 2 tablets now, then 1 tablet one hour later. Wait 12 hours then start 1 tablet bid for 5 days. Patient taking differently: Take 0.6 mg by mouth daily Take 2 tablets now, then 1 tablet one hour later. Wait 12 hours then start 1 tablet bid for 5 days. 2/15/22  Yes Samantha Harrell MD   Nebulizers (COMPRESSOR/NEBULIZER) MISC Nebulizer with compressor. Disposable Med Nebs 2 /month. Reusable Med Nebs 1 per 6 months. Aerosol Mask 1 per month. Replacement Filters 2 per month. All other related supplies as needed per month. Medications being used: Albuterol . 083 unit dose vial. Frequency: every 6 hrs x 4/day . Length of Need: 1 2/22/22   Joanie Ramesh MD   albuterol (PROVENTIL) (2.5 MG/3ML) 0.083% nebulizer solution Take 3 mLs by nebulization every 6 hours as needed for Wheezing or Shortness of Breath 2/22/22   Joanie Ramesh MD   [facility]. Devices (ADJUST FOLD CANE/YORK HANDLE) MISC Use daily for walking 2/22/22   Joanie Ramesh MD   Handicap Placard MISC by Does not apply route Expires on 12/30/25 2/22/22   MD Kurt Aquino Lancets MISC 1 each by Does not apply route daily Patient needs to contact office before any further refills will be approved 2/15/22   Samantha Harrell MD   Bismuth Tribromoph-Petrolatum (XEROFORM PETROLATUM PATCH 2\"X2\") PADS external pads Apply 1 each topically daily 12/10/21   Renella MAL Mcqueen CNP   blood glucose test strips (FREESTYLE LITE) strip 1 each by In Vitro route daily As needed.  3/19/21   Kayleigh MD Janae   Blood Pressure KIT 1 kit by Does not apply route three times daily 12/19/19   Margarita Eckert MD   blood glucose monitor kit and supplies 1 kit by Other route three times daily Dispense Butterfly Elite CBG Device 8/20/19   Lori Ahuja MD       Current medications:    Current Facility-Administered Medications   Medication Dose Route Frequency Provider Last Rate Last Admin    [Held by provider] apixaban (ELIQUIS) tablet 5 mg  5 mg Oral BID Jann Ad, DO   5 mg at 03/13/22 1127    benzonatate (TESSALON) capsule 200 mg  200 mg Oral TID PRN Massiel Maguire MD        dextromethorphan [facility]) 30 MG/5ML extended release liquid 60 mg  60 mg Oral Q12H PRN Massiel Maguire MD   60 mg at 03/13/22 1128    amiodarone (CORDARONE) tablet 200 mg  200 mg Oral Daily Jann Ad, DO   200 mg at 03/14/22 0815    metoprolol tartrate (LOPRESSOR) tablet 50 mg  50 mg Oral BID Parra Sparks [place], DO   50 mg at 03/14/22 0815    bumetanide (BUMEX) injection 2 mg  2 mg IntraVENous TID Daryl Singh MD   2 mg at 03/14/22 1407    ipratropium-albuterol (DUONEB) nebulizer solution 1 ampule  1 ampule Inhalation Q4H Massiel Maguire MD   1 ampule at 03/14/22 1502    sodium chloride flush 0.9 % injection 5-40 mL  5-40 mL IntraVENous 2 times per day Chris An MD   10 mL at 03/14/22 0819    sodium chloride flush 0.9 % injection 10 mL  10 mL IntraVENous PRN Chris An MD        0.9 % sodium chloride infusion  25 mL IntraVENous PRN Chris An  mL/hr at 03/12/22 1048 25 mL at 03/12/22 1048    ondansetron (ZOFRAN-ODT) disintegrating tablet 4 mg  4 mg Oral Q8H PRN Andrea Connor MD        Or    ondansetron (ZOFRAN) injection 4 mg  4 mg IntraVENous Q6H PRN Andrea Connor MD        magnesium hydroxide (MILK OF MAGNESIA) 400 MG/5ML suspension 30 mL  30 mL Oral Daily PRN Andrea Connor MD        acetaminophen (TYLENOL) tablet 650 mg  650 mg Oral Q6H PRN Chris An MD   650 mg at 03/14/22 1659    Or    acetaminophen (TYLENOL) suppository 650 mg  650 mg Rectal Q6H PRN Andrea Connor MD        allopurinol (ZYLOPRIM) tablet 300 mg  300 mg Oral Daily Andrea Connor MD   300 mg at 03/14/22 0815    atorvastatin (LIPITOR) tablet 40 mg  40 mg Oral Daily Andrea Connor MD   40 mg at 03/14/22 0815    magnesium oxide (MAG-OX) tablet 400 mg  400 mg Oral BID Reji Dudley MD   400 mg at 03/14/22 0815    miconazole (MICOTIN) 2 % powder   Topical BID Reji Dudley MD   Given at 03/14/22 0818    therapeutic multivitamin-minerals 1 tablet  1 tablet Oral Daily Reji Dudley MD   1 tablet at 03/14/22 0814    pantoprazole (PROTONIX) injection 40 mg  40 mg IntraVENous Daily Andrea Connor MD   40 mg at 03/14/22 0816    And    sodium chloride flush 0.9 % injection 10 mL  10 mL IntraVENous Daily Reji Dudley MD   10 mL at 03/14/22 0819    0.9 % sodium chloride infusion   IntraVENous PRN Reji Dudley MD           Allergies:     Allergies   Allergen Reactions    Latex Rash    Aleve [Naproxen Sodium]      Chronic kidney disease stage III, CHF    Pioglitazone Other (See Comments)     Congestive heart failure    Claritin [Loratadine]     Keflex [Cephalexin]     Lisinopril      Needs clarification of contraindication       Problem List:    Patient Active Problem List   Diagnosis Code    Vitamin D deficiency E55.9    Venous insufficiency of both lower extremities I87.2    Asthma J45.909    Type 2 diabetes mellitus with stage 3 chronic kidney disease, without long-term current use of insulin ([facility]) E11.22, N18.30    Coronary artery disease involving native coronary artery of native heart without angina pectoris I25.10    Iron deficiency anemia D50.9    Stage 3 chronic kidney disease ([facility]) N18.30    Colonoscopy refused Z53.20    Pneumococcal vaccination declined Z28.21    Impaired hearing H91.90    Chronic diastolic CHF (congestive heart failure) ([facility]) I50.32    COPD (chronic obstructive stated as uncontrolled     Unspecified asthma(493.90)     Unspecified venous (peripheral) insufficiency     Unspecified vitamin D deficiency     UTI (urinary tract infection) 2021       Past Surgical History:        Procedure Laterality Date    CORONARY ARTERY BYPASS GRAFT      x 3, Dr. Oneil Forbes  3/7/2022         THORACENTESIS  3/10/2022         TONSILLECTOMY AND ADENOIDECTOMY         Social History:    Social History     Tobacco Use    Smoking status: Former Smoker     Packs/day: 0.25     Years: 10.00     Pack years: 2.50     Quit date: 2000     Years since quittin.2    Smokeless tobacco: Never Used   Substance Use Topics    Alcohol use: Not Currently     Alcohol/week: 0.0 standard drinks                                Counseling given: Not Answered      Vital Signs (Current):   Vitals:    22 0625 22 0656 22 1044 22 1502   BP: (!) 142/60      Pulse: 83      Resp: 18 18 16 16   Temp: 97.9 °F (36.6 °C)      TempSrc: Oral      SpO2: 95% 95% 96% 97%   Weight:       Height:                                                  BP Readings from Last 3 Encounters:   22 (!) 142/60   22 110/60   22 (!) 105/58       NPO Status:                                                                                 BMI:   Wt Readings from Last 3 Encounters:   22 132 lb 0.9 oz (59.9 kg)   22 156 lb 1.4 oz (70.8 kg)   22 143 lb 6.4 oz (65 kg)     Body mass index is 26.67 kg/m².     CBC:   Lab Results   Component Value Date    WBC 5.2 2022    RBC 3.01 2022    HGB 9.0 2022    HCT 28.0 2022    MCV 93.3 2022    RDW 17.7 2022     2022       CMP:   Lab Results   Component Value Date     2022    K 3.8 2022    CL 96 2022    CO2 37 2022    BUN 60 2022    CREATININE 1.35 2022    GFRAA 47 2022    LABGLOM 39 2022    GLUCOSE 93 2022    PROT 4.7 03/10/2022    CALCIUM 8.5 03/14/2022    BILITOT 0.29 03/05/2022    ALKPHOS 184 03/05/2022    AST 40 03/05/2022    ALT 25 03/05/2022       POC Tests:   Recent Labs     03/14/22  1628   POCGLU 108*       Coags:   Lab Results   Component Value Date    PROTIME 16.0 02/06/2022    INR 1.3 02/06/2022    APTT 34.9 02/06/2022       HCG (If Applicable): No results found for: PREGTESTUR, PREGSERUM, HCG, HCGQUANT     ABGs:   Lab Results   Component Value Date    PHART 7.459 03/09/2022    PO2ART 107.0 03/09/2022    GZZ2FLL 39.9 03/09/2022    USO4OTG 28.3 03/09/2022    E0OCYHFR 97.4 03/09/2022        Type & Screen (If Applicable):  No results found for: LABABO, LABRH    Drug/Infectious Status (If Applicable):  Lab Results   Component Value Date    HEPCAB NONREACTIVE 03/11/2020       COVID-19 Screening (If Applicable):   Lab Results   Component Value Date    COVID19 Not Detected 03/01/2022           Anesthesia Evaluation  Patient summary reviewed and Nursing notes reviewed no history of anesthetic complications:   Airway: Mallampati: II  TM distance: >3 FB   Neck ROM: full  Mouth opening: > = 3 FB Dental: normal exam         Pulmonary:normal exam  breath sounds clear to auscultation  (+) COPD:  sleep apnea:  asthma:                           ROS comment: Recent/ly intubated for worsening LOC -3/7/22 and extubated 3/9/22    H/o Pleural Effusion s/p Pleurix catheter   Cardiovascular:    (+) hypertension:, valvular problems/murmurs (Mild-moderate aortic insufficiency): AI, past MI:, CAD:, CABG/stent (CABG, stents):, dysrhythmias (Paroxysmal AFIB): atrial fibrillation, CHF:, hyperlipidemia      ECG reviewed  Rhythm: regular  Rate: normal  Echocardiogram reviewed               ROS comment: Global left ventricular systolic function is normal. Estimated LV EF 60-65%.   No obvious wall motion abnormality seen     Neuro/Psych:   (+) CVA:, neuromuscular disease:,              ROS comment: Meningioma  Hard of hearing GI/Hepatic/Renal:   (+) renal disease: CRI and kidney stones,           Endo/Other:    (+) DiabetesType II DM, , blood dyscrasia: anemia and anticoagulation therapy:., .                  ROS comment: Patient found to have a hemoglobin of 4.4. on admission (3/1/22) s/p PRBCs transfusion Abdominal:             Vascular: negative vascular ROS. Other Findings:           Anesthesia Plan      general     ASA 3       Induction: intravenous. MIPS: Prophylactic antiemetics administered. Anesthetic plan and risks discussed with patient. Plan discussed with CRNA.                   Hafsa Moreno MD   3/14/2022

## 2022-03-14 NOTE — DISCHARGE INSTR - DIET

## 2022-03-14 NOTE — PROGRESS NOTES
[facility]    Gastroenterology Daily Progress Note      Patient:   Iman Hillman   :       [facility]ility[facility]   [facility]  Date:     3/14/2022  Consultant:   MAL Camargo CNP, CNP      SUBJECTIVE  66 y.o. female admitted 3/1/2022 with Acute GI bleeding [K92.2]  Symptomatic anemia [D64.9] and seen for iron deficiency anemia and positive fobt. The pt was seen and examined. hgb 9. No BM overnight. No c/o abdominal pain.         OBJECTIVE  Scheduled Meds:   [Held by provider] apixaban  5 mg Oral BID    amiodarone  200 mg Oral Daily    metoprolol tartrate  50 mg Oral BID    bumetanide  2 mg IntraVENous TID    meropenem  1,000 mg IntraVENous Q12H    ipratropium-albuterol  1 ampule Inhalation Q4H    sodium chloride flush  5-40 mL IntraVENous 2 times per day    allopurinol  300 mg Oral Daily    atorvastatin  40 mg Oral Daily    magnesium oxide  400 mg Oral BID    miconazole   Topical BID    therapeutic multivitamin-minerals  1 tablet Oral Daily    pantoprazole  40 mg IntraVENous Daily    And    sodium chloride flush  10 mL IntraVENous Daily       Vital Signs:  BP (!) 142/60   Pulse 83   Temp 97.9 °F (36.6 °C) (Oral)   Resp 18   Ht 4' 11\" (1.499 m)   Wt 132 lb 0.9 oz (59.9 kg)   SpO2 95%   BMI 26.67 kg/m²      Physical Exam:     General Appearance: alert and oriented to person, place and time, well-developed and well-nourished, in no acute distress  Skin: warm and dry, no rash or erythema  Head: normocephalic and atraumatic  Eyes: pupils equal, round, and reactive to light, extraocular eye movements intact, conjunctivae normal  ENT: hearing grossly normal bilaterally  Neck: neck supple and non tender without mass, no thyromegaly or thyroid nodules, no cervical lymphadenopathy   Pulmonary/Chest: clear to auscultation bilaterally- no wheezes, rales or rhonchi, normal air movement, no respiratory distress  Cardiovascular: normal rate, regular rhythm, normal S1 and S2, no murmurs, rubs, clicks or gallops, distal pulses intact, no carotid bruits  Abdomen: soft, obese  non-tender, non-distended, normal bowel sounds, no masses or organomegaly  Extremities: no cyanosis, clubbing or edema  Musculoskeletal: normal range of motion, no joint swelling, deformity or tenderness  Neurologic: no cranial nerve deficit and muscle strength normal    Lab and Imaging Review     CBC  Recent Labs     03/12/22 0525 03/13/22 0544 03/14/22  0648   WBC 5.5 5.1 5.2   HGB 8.9* 9.0* 9.0*   HCT 26.7* 27.6* 28.0*   MCV 92.2 92.7 93.3    251 246       BMP  Recent Labs     03/12/22 0525 03/13/22  0544 03/14/22  0648    140 140   K 4.3 3.8 3.8   CL 98 98 96*   CO2 33* 34* 37*   BUN 65* 61* 60*   CREATININE 1.35* 1.29* 1.35*   GLUCOSE 99 94 93   CALCIUM 8.2* 8.7 8.5*   Results for Brandy Jj (MRN [id number]) as of 3/14/2022 11:04   Ref. Range 3/3/2022 17:25   Alk Phos,Bone Specific Latest Ref Range: 0 - 55 U/L 128 (H)   Results for Brandy Jj (MRN [id number]) as of 3/14/2022 11:04   Ref. Range 3/3/2022 17:25   Alk Phosphatase Latest Ref Range: 40 - 120 U/L 200 (H)   Results for Brandy Jj (MRN [id number]) as of 3/14/2022 11:04   Ref. Range 3/3/2022 17:25   Alk Phos Liver Fract Latest Ref Range: 0 - 94 U/L 72     US LIVER     Result Date: 3/2/2022  EXAMINATION: RIGHT UPPER QUADRANT ULTRASOUND 3/2/2022 1:45 pm  FINDINGS: LIVER:  Diffuse increased liver parenchymal echogenicity is nonspecific but can be seen in patients with hepatic steatosis or diffuse hepatocellular process. .  No focal liver lesion.  No intrahepatic biliary ductal dilatation. The portal vein is patent and demonstrates biphasic flow which can be seen in patients with tricuspid regurgitation. BILIARY SYSTEM:  Gallbladder is unremarkable without evidence of pericholecystic fluid, wall thickening or stones.  Negative sonographic Graf's sign. Common bile duct is within normal limits measuring 4 mm.  RIGHT KIDNEY: Diffuse increased renal parenchymal echogenicity suggestive chronic medical renal.  No right hydronephrosis PANCREAS:  Visualized portions of the pancreas are unremarkable. OTHER: No evidence of right upper quadrant ascites.      Diffuse increased heterogeneous liver parenchymal echogenicity could be seen in patients with hepatic steatosis or diffuse hepatocellular process. Patent portal vein with biphasic flow can be seen in patient with tricuspid regurgitation. RECOMMENDATIONS: Unavailable     ASSESSMENT/plan  1. Iron deficiency anemia  - cleared for egd and colonoscopy from a pulmonary standpoint, clear diet today and then npo after midnight  -iron replacement  -ppi  -hold the [facility]  -d/w daughter at bedside    2. Elevated lft's  Us showing possible steatosis vs hepatocellular process  -alk isoenzyme shows elevation in bone     3. JOY     4.decompensated heart failure     5.acute respiratory failure-improved extubated 3/9        This plan was formulated in collaboration with Dr. Lorraine Sow . Electronically signed by: MAL Blanco CNP on 3/14/2022 at 9:18 AM     Attending Physician Statement  I have discussed the care of [address] [place] [address] Drive and   I have examined the patient myselft independently, and taken ros and hpi , including pertinent history and exam findings,  with the author of this note . I have reviewed the key elements of all parts of the encounter with the nurse practitioner/resident.     I agree with the assessment, plan and orders as documented by the above health care provider       Egd/collon in am   D/w pt and family    risks /benefits and alternatives discussed   Need to be watched carefully with prep, had issues last time when tried she said,cleared for egd and colonoscopy from a pulmonary standpoint     Electronically signed by Annalee aSntacruz MD

## 2022-03-14 NOTE — CARE COORDINATION
ONGOING DISCHARGE PLAN:    Patient is alert and oriented x4. 2 dtrs at bedside. Spoke with patient regarding discharge plan and patient confirms that plan is still home with family with [facility]. Call out to [facility] to see if patient has been accepted. HELP Program working with patient for Medicaid. IV Merrem. Repeat 2decho today. HGB 9.0 today. Eliquis restarted 3/12 Low sodium diet. ? Need for EGD/COLON in future. BUN 60 Creat 1.35     Will continue to follow for additional discharge needs. Electronically signed by Ida Urbano RN on 3/14/2022 at 9:52 AM    [facility] from [facility] confirmed that the patient has been accepted for [facility].   Electronically signed by Ida Urbano RN on 3/14/2022 at 10:04 AM

## 2022-03-14 NOTE — PROGRESS NOTES
[facility]    INPATIENT OCCUPATIONAL THERAPY  PROGRESS NOTE  Date: 3/14/2022  Patient Name: Krista Ricci      Room: 7167/4826-20  MRN: [id number]    : 1950  (72 y.o.) Gender: female     Discharge Recommendations:  Further Occupational Therapy is recommended upon facility discharge. OT Equipment Recommendations  Equipment Needed: Yes  Mobility Devices: ADL Assistive Devices    Referring Practitioner: Dr Farmer Every  Diagnosis: Chronic stage 3 Kidney Disease  General  Chart Reviewed: Yes,Orders,Progress Notes,History and Physical,Imaging  Additional Pertinent Hx: Hard of Hearing  Response to previous treatment: Patient with no complaints from previous session  Family / Caregiver Present: Yes  Referring Practitioner: Dr Farmer Every  Diagnosis: Chronic stage 3 Kidney Disease    Restrictions  Restrictions/Precautions: Fall Risk      Subjective  Subjective: \" My feet feel like bricks\"   Patient Currently in Pain: Yes (pt states she has frequent headaches )  Overall Orientation Status: Within Functional Limits  Patient Observation  Observations: Pt is pleasant and motivated for therapy        Objective  Cognition  Overall Cognitive Status: WFL     Balance  Sitting Balance: Supervision (seated in bed side chair )         ADL  Feeding: Contact guard assistance  Grooming: Minimal assistance  UE Bathing: Moderate assistance  LE Bathing: Dependent/Total  UE Dressing: Moderate assistance  LE Dressing: Dependent/Total  Toileting: Maximum assistance  Additional Comments: ADL scores based on skilled observation this date. Type of ROM/Therapeutic Exercise  Type of ROM/Therapeutic Exercise: Resistive Bands  Comment: Pt engaged in BUE exercises with threrapy bands, 15 reps each.  Completed to increase strength and endurance for ADL and IADL activites   Exercises  Scapular Protraction: x  Scapular Retraction: x  Shoulder Flexion: x  Shoulder Extension: x  Shoulder ABduction: x  Shoulder ADduction: x  Horizontal ABduction: x  Horizontal ADduction: x  Elbow Flexion: x  Elbow Extension: x           Assessment  Performance deficits / Impairments: Decreased functional mobility ; Decreased ADL status; Decreased strength;Decreased safe awareness;Decreased cognition;Decreased endurance  Prognosis: Good  Discharge Recommendations: Home with assist PRN;Home with nursing aide;Home with Home health OT  Activity Tolerance: Patient Tolerated treatment well  Safety Devices in place: Yes  Type of devices: Left in chair;Nurse notified;Call light within reach             Patient Education:     Learner:patient  Method: demonstration and explanation       Outcome: acknowledged understanding     Plan  Safety Devices  Safety Devices in place: Yes  Type of devices: Left in chair,Nurse notified,Call light within reach  Plan  Times per week: 3-5 sessions  Times per day: Daily  Current Treatment Recommendations: Strengthening,Functional Mobility Training,Endurance Training,Cognitive Reorientation,Safety Education & Training,Patient/Caregiver Education & Training,Self-Care / ADL,Home Management Training      Goals  Short term goals  Time Frame for Short term goals: By Discharge  Short term goal 1: Patient / Family D/V understanding of Home Program for safety and UE conditioning/strengthening to support safety at home  Short term goal 2: Tolerate 10-25 minutes of general activity to support care tasks with application to home needs  Short term goal 3: D/V understanding of DME/AE/Modified Care techniques to support participateion in Care tasks    OT Individual Minutes  Time In: 1339  Time Out: 1409  Minutes: 30      Electronically signed by EMILY Sofia on 3/14/22 at 3:43 PM EDT

## 2022-03-14 NOTE — PROGRESS NOTES
Comprehensive Nutrition Assessment    Type and Reason for Visit:  Reassess    Nutrition Recommendations/Plan:   Will provide diet as ordered and add supplements appropriate to diet order. Nutrition Assessment:  Pt x 12 days without adequate nutrition. Pt is currently on Clear liquids for EGD/Colonoscopy tomorrow. Malnutrition Assessment:  Malnutrition Status:  Mild malnutrition    Context:  Acute Illness     Findings of the 6 clinical characteristics of malnutrition:  Energy Intake:  1 - 75% or less of estimated energy requirements for 7 or more days  Weight Loss:  Unable to assess (edema present)     Body Fat Loss:  Unable to assess     Muscle Mass Loss:  Unable to assess    Fluid Accumulation:  1 - Mild Generalized   Strength:  Not Performed    Estimated Daily Nutrient Needs:  Energy (kcal):  8626-9559 kcals based on 25-27 kcals/kg using adm wt 61 kg; Weight Used for Energy Requirements:  Admission     Protein (g):  69-77 gm protein based on 1.6-1.8 gm/kg using IBW.; Weight Used for Protein Requirements:  Ideal          Nutrition Related Findings:  Labs: elevated LFT's, Meds: Reviewed, BM 3/13      Wounds:  None       Current Nutrition Therapies:    ADULT ORAL NUTRITION SUPPLEMENT; Breakfast, Lunch, Dinner; Standard High Calorie/High Protein Oral Supplement  Diet NPO Exceptions are: Sips of Water with Meds  ADULT DIET; Clear Liquid; Low Sodium (2 gm); 1500 ml;  No red dye  ADULT ORAL NUTRITION SUPPLEMENT; Breakfast, Lunch, Dinner; Clear Liquid Oral Supplement    Anthropometric Measures:  · Height: 4' 11\" (149.9 cm)  · Current Body Weight: 132 lb (59.9 kg)   · Admission Body Weight: 135 lb (61.2 kg)    Ideal Body Weight: 95 lbs;     Nutrition Diagnosis:   · Mild malnutrition related to impaired respiratory function,inadequate protein-energy intake as evidenced by intake 0-25%    Nutrition Interventions:   Food and/or Nutrient Delivery:  Modify Current Diet,Start Oral Nutrition Supplement  Nutrition Education/Counseling:  No recommendation at this time   Coordination of Nutrition Care:  Continue to monitor while inpatient    Goals:  Meet estimated nutrient needs       Nutrition Monitoring and Evaluation:   Food/Nutrient Intake Outcomes:  Diet Advancement/Tolerance,Food and Nutrient Intake,Supplement Intake  Physical Signs/Symptoms Outcomes:  Biochemical Data,GI Status,Fluid Status or Edema,Skin,Weight     Discharge Planning:     Too soon to determine     Electronically signed by Kianna Spence RD, LD on 3/14/22 at 1:35 PM EDT    Contact: [phone]

## 2022-03-14 NOTE — PROGRESS NOTES
Department of Internal Medicine  Nephrology [facility], MD  Progress note    Reason for consultation: Management of acute kidney injury superimposed on chronic kidney disease stage III. Consulting physician: Dain Mariscal MD.    Interval history:   Patient was seen and examined. Shortness of breath is slowly improving however her chest x-ray continues to show persistent bilateral effusion and bilateral infiltrates possibly suggestive of pulmonary vascular congestion -patient developed acute hypoxic respiratory failure in the ICU and is  status post extubation on 3/9/2022. She underwent right thoracentesis with removal of 800 mL of pleural fluid on 3/10/22  Laboratory studies were reviewed. Renal function is stable, serum creatinine 1.3 mg/dL    History of present illness: This is a 66 y.o. female with a significant past medical history of Heart Failure with reduced ejection fraction [HFrEF with LVEF 45 to 50% in July 2020 secondary to ischemic cardiomyopathy], Coronary artery disease, Meningioma, type 2 diabetes mellitus [with nonproliferative diabetic retinopathy], essential hypertension, bilateral deafness and chronic kidney disease stage III [baseline serum creatinine 1.6 mg/dL and follows up with Dr. Marcela Munoz of [facility] whom she saw 1 week ago and was switched from Lasix to Bumex], presented with complaints of fatigue and dyspnea with exertion progressively worsened over 2 weeks. She had been recently admitted and seen by us at [facility] on 2/11/2022. Laboratory studies at presentation however were remarkable for hemoglobin 7.4 g/dL and BUN/creatinine 110/2.11 mg/dL. She denies hemoptysis or melena stools. However, hemoglobin dropped overnight to 4.4 g/dL and she is scheduled for endoscopy tomorrow.     Scheduled Meds:   [Held by provider] apixaban  5 mg Oral BID    amiodarone  200 mg Oral Daily    metoprolol tartrate  50 mg Oral BID    bumetanide  2 mg IntraVENous TID    meropenem  1,000 mg IntraVENous Q12H    ipratropium-albuterol  1 ampule Inhalation Q4H    sodium chloride flush  5-40 mL IntraVENous 2 times per day    allopurinol  300 mg Oral Daily    atorvastatin  40 mg Oral Daily    magnesium oxide  400 mg Oral BID    miconazole   Topical BID    therapeutic multivitamin-minerals  1 tablet Oral Daily    pantoprazole  40 mg IntraVENous Daily    And    sodium chloride flush  10 mL IntraVENous Daily     Continuous Infusions:   sodium chloride 25 mL (03/12/22 1048)    sodium chloride       PRN Meds:.benzonatate, dextromethorphan, sodium chloride flush, sodium chloride, ondansetron **OR** ondansetron, magnesium hydroxide, acetaminophen **OR** acetaminophen, sodium chloride    Physical Exam:    VITALS:  BP (!) 142/60   Pulse 83   Temp 97.9 °F (36.6 °C) (Oral)   Resp 16   Ht 4' 11\" (1.499 m)   Wt 132 lb 0.9 oz (59.9 kg)   SpO2 97%   BMI 26.67 kg/m²     Constitutional: Patient is sedated, intubated on ventilator    Skin: Skin color, texture, turgor normal. No rashes or lesions    Head: Normocephalic, without obvious abnormality, atraumatic     Cardiovascular/Edema: S1, S2, regular rate and rhythm with no pericardial rub. Respiratory: Diminished breath sounds bilaterally [right worse than left. Abdomen: Full, soft with normal bowel sounds. Back: symmetric, no curvature. ROM normal. No CVA tenderness. Extremities: Bilateral pedal edema.   Neuro:  Grossly normal      CBC:   Recent Labs     03/12/22  0525 03/13/22  0544 03/14/22  0648   WBC 5.5 5.1 5.2   HGB 8.9* 9.0* 9.0*    251 246     BMP:    Recent Labs     03/12/22  0525 03/13/22  0544 03/14/22  0648    140 140   K 4.3 3.8 3.8   CL 98 98 96*   CO2 33* 34* 37*   BUN 65* 61* 60*   CREATININE 1.35* 1.29* 1.35*   GLUCOSE 99 94 93       Lab Results   Component Value Date    NITRU NEGATIVE 02/22/2022    COLORU Yellow 02/22/2022    PHUR 5.0 02/22/2022    WBCUA 10 TO 20 02/22/2022 RBCUA TOO NUMEROUS TO COUNT 02/22/2022    MUCUS NOT REPORTED 02/07/2022    TRICHOMONAS NOT REPORTED 02/07/2022    YEAST NOT REPORTED 02/07/2022    BACTERIA None 02/22/2022    SPECGRAV 1.016 02/22/2022    LEUKOCYTESUR NEGATIVE 02/22/2022    UROBILINOGEN Normal 02/22/2022    BILIRUBINUR NEGATIVE 02/22/2022    GLUCOSEU TRACE 02/22/2022    KETUA NEGATIVE 02/22/2022    AMORPHOUS NOT REPORTED 02/07/2022     Urine Protein:     Lab Results   Component Value Date     10/27/2020     Urine Creatinine:     Lab Results   Component Value Date    LABCREA 26.2 05/26/2021     Imaging studies. Chest x-ray increasing congestive changes small-to-moderate bilateral pleural effusion      IMPRESSION/RECOMMENDATIONS:      1. Acute kidney injury superimposed on chronic kidney disease stage III - most consistent with prerenal azotemia led to dehydration and anemia. Patient has ongoing indications for continuation of diuretics. Renal function is improving with creatinine of 1.3 mg/dL today. Monitor urine output closely. Avoid nephrotoxic agents. Basic metabolic profile daily. 2.  Systemic hypertension - Blood pressure is adequately controlled. 3.  Acute on chronic anemia - rule out GI bleed. Hemoglobin today is 8.9 g/dL. 4.  Decompensated heart failure with preserved ejection fraction [HFpEF] - continue Bumex 2 mg IV 3 times daily. Monitor strict I's and O's, 1500 mils fluid restriction and 2 g sodium diet daily   metolazone 5 mg daily x3 days  No current indication for ultrafiltration. 5.  Right pleural effusion s/p thoracentesis [3/10/2022]. Prognosis is guarded.     Sharyle Sheen, MD   Attending Nephrologist  3/14/2022 4:24 PM

## 2022-03-15 ENCOUNTER — ANESTHESIA (OUTPATIENT)
Dept: ENDOSCOPY | Age: 72
DRG: 377 | End: 2022-03-15
Payer: OTHER GOVERNMENT

## 2022-03-15 VITALS — SYSTOLIC BLOOD PRESSURE: 98 MMHG | OXYGEN SATURATION: 100 % | TEMPERATURE: 98.2 F | DIASTOLIC BLOOD PRESSURE: 49 MMHG

## 2022-03-15 LAB
ANION GAP SERPL CALCULATED.3IONS-SCNC: 12 MMOL/L (ref 9–17)
BUN BLDV-MCNC: 56 MG/DL (ref 8–23)
CALCIUM SERPL-MCNC: 8.5 MG/DL (ref 8.6–10.4)
CHLORIDE BLD-SCNC: 95 MMOL/L (ref 98–107)
CO2: 35 MMOL/L (ref 20–31)
CREAT SERPL-MCNC: 1.53 MG/DL (ref 0.5–0.9)
GFR AFRICAN AMERICAN: 40 ML/MIN
GFR NON-AFRICAN AMERICAN: 33 ML/MIN
GFR SERPL CREATININE-BSD FRML MDRD: ABNORMAL ML/MIN/{1.73_M2}
GLUCOSE BLD-MCNC: 113 MG/DL (ref 65–105)
GLUCOSE BLD-MCNC: 135 MG/DL (ref 65–105)
GLUCOSE BLD-MCNC: 68 MG/DL (ref 65–105)
GLUCOSE BLD-MCNC: 74 MG/DL (ref 65–105)
GLUCOSE BLD-MCNC: 85 MG/DL (ref 70–99)
GLUCOSE BLD-MCNC: 87 MG/DL (ref 65–105)
GLUCOSE BLD-MCNC: 90 MG/DL (ref 65–105)
HCT VFR BLD CALC: 27.6 % (ref 36–46)
HEMOGLOBIN: 8.9 G/DL (ref 12–16)
MAGNESIUM: 2 MG/DL (ref 1.6–2.6)
MCH RBC QN AUTO: 29.7 PG (ref 26–34)
MCHC RBC AUTO-ENTMCNC: 32.1 G/DL (ref 31–37)
MCV RBC AUTO: 92.7 FL (ref 80–100)
PDW BLD-RTO: 17.5 % (ref 11.5–14.9)
PLATELET # BLD: 223 K/UL (ref 150–450)
PMV BLD AUTO: 7.9 FL (ref 6–12)
POTASSIUM SERPL-SCNC: 3.4 MMOL/L (ref 3.7–5.3)
RBC # BLD: 2.98 M/UL (ref 4–5.2)
SODIUM BLD-SCNC: 142 MMOL/L (ref 135–144)
WBC # BLD: 5.2 K/UL (ref 3.5–11)

## 2022-03-15 PROCEDURE — 2580000003 HC RX 258: Performed by: ANESTHESIOLOGY

## 2022-03-15 PROCEDURE — 80048 BASIC METABOLIC PNL TOTAL CA: CPT

## 2022-03-15 PROCEDURE — 94640 AIRWAY INHALATION TREATMENT: CPT

## 2022-03-15 PROCEDURE — 88305 TISSUE EXAM BY PATHOLOGIST: CPT

## 2022-03-15 PROCEDURE — 6370000000 HC RX 637 (ALT 250 FOR IP): Performed by: INTERNAL MEDICINE

## 2022-03-15 PROCEDURE — 3609012400 HC EGD TRANSORAL BIOPSY SINGLE/MULTIPLE: Performed by: INTERNAL MEDICINE

## 2022-03-15 PROCEDURE — 2700000000 HC OXYGEN THERAPY PER DAY

## 2022-03-15 PROCEDURE — 6360000002 HC RX W HCPCS: Performed by: INTERNAL MEDICINE

## 2022-03-15 PROCEDURE — 83735 ASSAY OF MAGNESIUM: CPT

## 2022-03-15 PROCEDURE — 36415 COLL VENOUS BLD VENIPUNCTURE: CPT

## 2022-03-15 PROCEDURE — 2500000003 HC RX 250 WO HCPCS: Performed by: INTERNAL MEDICINE

## 2022-03-15 PROCEDURE — 45378 DIAGNOSTIC COLONOSCOPY: CPT | Performed by: INTERNAL MEDICINE

## 2022-03-15 PROCEDURE — 82947 ASSAY GLUCOSE BLOOD QUANT: CPT

## 2022-03-15 PROCEDURE — 43239 EGD BIOPSY SINGLE/MULTIPLE: CPT | Performed by: INTERNAL MEDICINE

## 2022-03-15 PROCEDURE — 85027 COMPLETE CBC AUTOMATED: CPT

## 2022-03-15 PROCEDURE — 6360000002 HC RX W HCPCS: Performed by: NURSE ANESTHETIST, CERTIFIED REGISTERED

## 2022-03-15 PROCEDURE — 6370000000 HC RX 637 (ALT 250 FOR IP)

## 2022-03-15 PROCEDURE — 2580000003 HC RX 258: Performed by: INTERNAL MEDICINE

## 2022-03-15 PROCEDURE — C9113 INJ PANTOPRAZOLE SODIUM, VIA: HCPCS | Performed by: INTERNAL MEDICINE

## 2022-03-15 PROCEDURE — 99232 SBSQ HOSP IP/OBS MODERATE 35: CPT | Performed by: INTERNAL MEDICINE

## 2022-03-15 PROCEDURE — 3700000001 HC ADD 15 MINUTES (ANESTHESIA): Performed by: INTERNAL MEDICINE

## 2022-03-15 PROCEDURE — 0DJD8ZZ INSPECTION OF LOWER INTESTINAL TRACT, VIA NATURAL OR ARTIFICIAL OPENING ENDOSCOPIC: ICD-10-PCS | Performed by: INTERNAL MEDICINE

## 2022-03-15 PROCEDURE — 1200000000 HC SEMI PRIVATE

## 2022-03-15 PROCEDURE — 7100000000 HC PACU RECOVERY - FIRST 15 MIN: Performed by: INTERNAL MEDICINE

## 2022-03-15 PROCEDURE — 7100000001 HC PACU RECOVERY - ADDTL 15 MIN: Performed by: INTERNAL MEDICINE

## 2022-03-15 PROCEDURE — 3609027000 HC COLONOSCOPY: Performed by: INTERNAL MEDICINE

## 2022-03-15 PROCEDURE — 3700000000 HC ANESTHESIA ATTENDED CARE: Performed by: INTERNAL MEDICINE

## 2022-03-15 PROCEDURE — 0DB88ZX EXCISION OF SMALL INTESTINE, VIA NATURAL OR ARTIFICIAL OPENING ENDOSCOPIC, DIAGNOSTIC: ICD-10-PCS | Performed by: INTERNAL MEDICINE

## 2022-03-15 PROCEDURE — 0DB98ZX EXCISION OF DUODENUM, VIA NATURAL OR ARTIFICIAL OPENING ENDOSCOPIC, DIAGNOSTIC: ICD-10-PCS | Performed by: INTERNAL MEDICINE

## 2022-03-15 PROCEDURE — 2709999900 HC NON-CHARGEABLE SUPPLY: Performed by: INTERNAL MEDICINE

## 2022-03-15 PROCEDURE — 94762 N-INVAS EAR/PLS OXIMTRY CONT: CPT

## 2022-03-15 RX ORDER — SODIUM CHLORIDE 0.9 % (FLUSH) 0.9 %
10 SYRINGE (ML) INJECTION PRN
Status: DISCONTINUED | OUTPATIENT
Start: 2022-03-15 | End: 2022-03-15 | Stop reason: HOSPADM

## 2022-03-15 RX ORDER — LIDOCAINE HYDROCHLORIDE 10 MG/ML
1 INJECTION, SOLUTION EPIDURAL; INFILTRATION; INTRACAUDAL; PERINEURAL
Status: DISCONTINUED | OUTPATIENT
Start: 2022-03-15 | End: 2022-03-15 | Stop reason: HOSPADM

## 2022-03-15 RX ORDER — PROPOFOL 10 MG/ML
INJECTION, EMULSION INTRAVENOUS CONTINUOUS PRN
Status: DISCONTINUED | OUTPATIENT
Start: 2022-03-15 | End: 2022-03-15 | Stop reason: SDUPTHER

## 2022-03-15 RX ORDER — POTASSIUM CHLORIDE 20 MEQ/1
20 TABLET, EXTENDED RELEASE ORAL ONCE
Status: COMPLETED | OUTPATIENT
Start: 2022-03-15 | End: 2022-03-15

## 2022-03-15 RX ORDER — SODIUM CHLORIDE 9 MG/ML
INJECTION, SOLUTION INTRAVENOUS CONTINUOUS
Status: DISCONTINUED | OUTPATIENT
Start: 2022-03-15 | End: 2022-03-15

## 2022-03-15 RX ORDER — DEXTROSE MONOHYDRATE 50 MG/ML
INJECTION, SOLUTION INTRAVENOUS CONTINUOUS
Status: DISCONTINUED | OUTPATIENT
Start: 2022-03-15 | End: 2022-03-15

## 2022-03-15 RX ORDER — SODIUM CHLORIDE 9 MG/ML
25 INJECTION, SOLUTION INTRAVENOUS PRN
Status: DISCONTINUED | OUTPATIENT
Start: 2022-03-15 | End: 2022-03-15 | Stop reason: HOSPADM

## 2022-03-15 RX ADMIN — BENZONATATE 200 MG: 200 CAPSULE ORAL at 20:20

## 2022-03-15 RX ADMIN — DEXTROSE MONOHYDRATE: 50 INJECTION, SOLUTION INTRAVENOUS at 12:05

## 2022-03-15 RX ADMIN — PANTOPRAZOLE SODIUM 40 MG: 40 INJECTION, POWDER, FOR SOLUTION INTRAVENOUS at 08:29

## 2022-03-15 RX ADMIN — MAGNESIUM OXIDE TAB 400 MG (241.3 MG ELEMENTAL MG) 400 MG: 400 (241.3 MG) TAB at 20:21

## 2022-03-15 RX ADMIN — SODIUM CHLORIDE, PRESERVATIVE FREE 10 ML: 5 INJECTION INTRAVENOUS at 20:24

## 2022-03-15 RX ADMIN — METOPROLOL TARTRATE 50 MG: 50 TABLET, FILM COATED ORAL at 20:20

## 2022-03-15 RX ADMIN — BUMETANIDE 2 MG: 0.25 INJECTION, SOLUTION INTRAMUSCULAR; INTRAVENOUS at 15:05

## 2022-03-15 RX ADMIN — ANTI-FUNGAL POWDER MICONAZOLE NITRATE TALC FREE: 1.42 POWDER TOPICAL at 08:38

## 2022-03-15 RX ADMIN — IPRATROPIUM BROMIDE AND ALBUTEROL SULFATE 1 AMPULE: 2.5; .5 SOLUTION RESPIRATORY (INHALATION) at 15:03

## 2022-03-15 RX ADMIN — Medication 60 MG: at 18:27

## 2022-03-15 RX ADMIN — SODIUM CHLORIDE: 9 INJECTION, SOLUTION INTRAVENOUS at 11:30

## 2022-03-15 RX ADMIN — ATORVASTATIN CALCIUM 40 MG: 40 TABLET, FILM COATED ORAL at 18:28

## 2022-03-15 RX ADMIN — ACETAMINOPHEN 650 MG: 325 TABLET, FILM COATED ORAL at 20:21

## 2022-03-15 RX ADMIN — PROPOFOL 100 MCG/KG/MIN: 10 INJECTION, EMULSION INTRAVENOUS at 13:26

## 2022-03-15 RX ADMIN — ANTI-FUNGAL POWDER MICONAZOLE NITRATE TALC FREE: 1.42 POWDER TOPICAL at 20:23

## 2022-03-15 RX ADMIN — IPRATROPIUM BROMIDE AND ALBUTEROL SULFATE 1 AMPULE: 2.5; .5 SOLUTION RESPIRATORY (INHALATION) at 23:38

## 2022-03-15 RX ADMIN — BUMETANIDE 2 MG: 0.25 INJECTION, SOLUTION INTRAMUSCULAR; INTRAVENOUS at 20:21

## 2022-03-15 RX ADMIN — ALLOPURINOL 300 MG: 300 TABLET ORAL at 18:28

## 2022-03-15 RX ADMIN — APIXABAN 5 MG: 5 TABLET, FILM COATED ORAL at 20:21

## 2022-03-15 RX ADMIN — IPRATROPIUM BROMIDE AND ALBUTEROL SULFATE 1 AMPULE: 2.5; .5 SOLUTION RESPIRATORY (INHALATION) at 03:40

## 2022-03-15 RX ADMIN — MULTIPLE VITAMINS W/ MINERALS TAB 1 TABLET: TAB at 18:28

## 2022-03-15 RX ADMIN — SODIUM CHLORIDE, PRESERVATIVE FREE 10 ML: 5 INJECTION INTRAVENOUS at 08:37

## 2022-03-15 RX ADMIN — METOPROLOL TARTRATE 50 MG: 50 TABLET, FILM COATED ORAL at 08:29

## 2022-03-15 RX ADMIN — AMIODARONE HYDROCHLORIDE 200 MG: 200 TABLET ORAL at 08:29

## 2022-03-15 RX ADMIN — IPRATROPIUM BROMIDE AND ALBUTEROL SULFATE 1 AMPULE: 2.5; .5 SOLUTION RESPIRATORY (INHALATION) at 19:40

## 2022-03-15 RX ADMIN — POTASSIUM CHLORIDE 20 MEQ: 1500 TABLET, EXTENDED RELEASE ORAL at 18:27

## 2022-03-15 RX ADMIN — BUMETANIDE 2 MG: 0.25 INJECTION, SOLUTION INTRAMUSCULAR; INTRAVENOUS at 08:29

## 2022-03-15 RX ADMIN — ACETAMINOPHEN 650 MG: 325 TABLET, FILM COATED ORAL at 15:04

## 2022-03-15 ASSESSMENT — PULMONARY FUNCTION TESTS
PIF_VALUE: 1

## 2022-03-15 ASSESSMENT — PAIN DESCRIPTION - FREQUENCY: FREQUENCY: CONTINUOUS

## 2022-03-15 ASSESSMENT — PAIN SCALES - GENERAL
PAINLEVEL_OUTOF10: 0
PAINLEVEL_OUTOF10: 4
PAINLEVEL_OUTOF10: 0
PAINLEVEL_OUTOF10: 3

## 2022-03-15 ASSESSMENT — PAIN DESCRIPTION - LOCATION: LOCATION: HEAD

## 2022-03-15 ASSESSMENT — PAIN - FUNCTIONAL ASSESSMENT
PAIN_FUNCTIONAL_ASSESSMENT: 0-10
PAIN_FUNCTIONAL_ASSESSMENT: ACTIVITIES ARE NOT PREVENTED

## 2022-03-15 ASSESSMENT — PAIN DESCRIPTION - DESCRIPTORS: DESCRIPTORS: HEADACHE

## 2022-03-15 ASSESSMENT — PAIN DESCRIPTION - PROGRESSION: CLINICAL_PROGRESSION: NOT CHANGED

## 2022-03-15 ASSESSMENT — PAIN DESCRIPTION - PAIN TYPE: TYPE: ACUTE PAIN

## 2022-03-15 ASSESSMENT — PAIN DESCRIPTION - ONSET: ONSET: ON-GOING

## 2022-03-15 NOTE — PROGRESS NOTES
[place] [id number] Internal Medicine    Progress Note    3/15/2022    2:49 PM    Name:   Meet Butler  MRN:     [id number]     Acct:      [de-identified]   Room:   2047/2047-01  IP Day:  15  Admit Date:  3/1/2022  9:47 PM    PCP:   Isabel Robertson MD  Code Status:  Full Code    Subjective:     C/C:   Chief Complaint   Patient presents with    Fatigue         HPI:     See HPI    Review of Systems:   Positive for shortness of breath no new cough  Denies chest pain or palpitations  Denies abdominal pain, diarrhea vomiting  Denies any new numbness tremors or weakness. Medications: Allergies:     Allergies   Allergen Reactions    Latex Rash    Aleve [Naproxen Sodium]      Chronic kidney disease stage III, CHF    Pioglitazone Other (See Comments)     Congestive heart failure    Claritin [Loratadine]     Keflex [Cephalexin]     Lisinopril      Needs clarification of contraindication       Current Meds:   Scheduled Meds:    apixaban  5 mg Oral BID    amiodarone  200 mg Oral Daily    metoprolol tartrate  50 mg Oral BID    bumetanide  2 mg IntraVENous TID    ipratropium-albuterol  1 ampule Inhalation Q4H    sodium chloride flush  5-40 mL IntraVENous 2 times per day    allopurinol  300 mg Oral Daily    atorvastatin  40 mg Oral Daily    magnesium oxide  400 mg Oral BID    miconazole   Topical BID    therapeutic multivitamin-minerals  1 tablet Oral Daily    pantoprazole  40 mg IntraVENous Daily    And    sodium chloride flush  10 mL IntraVENous Daily     Continuous Infusions:    sodium chloride 25 mL (03/12/22 1048)    sodium chloride       PRN Meds: benzonatate, dextromethorphan, sodium chloride flush, sodium chloride, ondansetron **OR** ondansetron, magnesium hydroxide, acetaminophen **OR** acetaminophen, sodium chloride    Data:     Past Medical History:   has a past medical history of Acute CVA (cerebrovascular accident) ([facility] [address] Altered mental status, Arthritis, Brain mass, Cellulitis and abscess, Cellulitis and abscess of unspecified site, Cellulitis of both lower extremities, Cellulitis of left lower extremity, Cellulitis of lower extremity, CHF (congestive heart failure) ([facility] [address] Chronic kidney disease, unspecified, Coronary atherosclerosis of native coronary artery, Essential hypertension, Essential hypertension, malignant, Hallucinations, Hard of hearing, Head contusion, Hypokalemia, Iron deficiency anemia, unspecified, Meningioma ([facility] [address] Myocardial infarction ([facility] [address] Other and unspecified hyperlipidemia, Pure hypercholesterolemia, Toxic metabolic encephalopathy, Type II or unspecified type diabetes mellitus without mention of complication, not stated as uncontrolled, Unspecified asthma(493.90), Unspecified venous (peripheral) insufficiency, Unspecified vitamin D deficiency, and UTI (urinary tract infection). Social History:   reports that she quit smoking about 22 years ago. She has a 2.50 pack-year smoking history. She has never used smokeless tobacco. She reports previous alcohol use. She reports that she does not use drugs. Family History:   Family History   Problem Relation Age of Onset    Diabetes Mother     Heart Disease Mother     Heart Disease Father     Diabetes Sister     High Blood Pressure Sister     Diabetes Maternal Grandmother        Vitals:  BP (!) 124/40   Pulse 72   Temp 98.2 °F (36.8 °C)   Resp 19   Ht 4' 11\" (1.499 m)   Wt 132 lb (59.9 kg)   SpO2 100%   BMI 26.66 kg/m²   Temp (24hrs), Av °F (36.7 °C), Min:97.3 °F (36.3 °C), Max:98.6 °F (37 °C)    Recent Labs     03/15/22  0612 03/15/22  1133 03/15/22  1244 03/15/22  1357   POCGLU 74 68 90 113*       I/O (24Hr):     Intake/Output Summary (Last 24 hours) at 3/15/2022 1449  Last data filed at 3/15/2022 1431  Gross per 24 hour   Intake 3329.48 ml   Output 250 ml   Net 3079.48 ml       Labs:    Lab Results   Component Value Date    WBC 5.2 03/15/2022    HGB 8.9 (L) 03/15/2022    HCT 27.6 (L) 03/15/2022    MCV 92.7 03/15/2022     03/15/2022     Lab Results   Component Value Date     03/15/2022    K 3.4 03/15/2022    CL 95 03/15/2022    CO2 35 03/15/2022    BUN 56 03/15/2022    CREATININE 1.53 03/15/2022    GLUCOSE 85 03/15/2022    CALCIUM 8.5 03/15/2022          Lab Results   Component Value Date/Time    SPECIAL RIGHT 03/10/2022 11:00 AM     Lab Results   Component Value Date/Time    CULTURE NO GROWTH 5 DAYS 03/10/2022 11:00 AM         Radiology:    Recent data reviewed    Physical Examination:        General appearance:  alert, cooperative and no distress  Eyes: Anicteric sclera. Pupils are equally round and reactive to light. Extraocular movements are intact. Lungs:  B/l reduced air entry, bilateral crackles.    Heart:  regular rate and rhythm, no murmur  Abdomen:  soft, nontender, nondistended, normal bowel sounds, no masses, hepatomegaly, splenomegaly  Extremities:  no edema, redness, tenderness in the calves  Skin:  no gross lesions, rashes, induration  Neuro:  Alert, oriented X 3, no new focal weakness  Assessment:        Primary Problem  Acute GI bleeding    Active Hospital Problems    Diagnosis Date Noted    Mild malnutrition ([facility] [address] [E44.1] 03/08/2022    Symptomatic anemia [D64.9]     Family history of colon cancer [Z80.0]     Family history of pancreatic cancer [Z80.0]     Elevated LFTs [R79.89] 02/25/2021    Paroxysmal atrial fibrillation ([facility] [address] [I48.0] 07/28/2020    Stage 3 chronic kidney disease ([facility] [address] [N18.30]            DVT prophylaxis: Mechanical  Antibiotics: Day 7 of Merrem-discontinued today    Plan:        71-year-old lady with history of atrial fibrillation history of coronary disease stent placed 20 years ago on Eliquis and Plavix 75 baseline hemoglobin was 10 2 weeks ago admitted with gradual increase of fatigue dyspnea exertion sleepy most part of the day no fever no vomiting blood no blood in the stool no dark stools no diarrhea hemoglobin ER was 4.4 suspected GI bleed Eliquis and Plavix held n.p.o. for now upper and lower endoscopies GI consulted  IV Venofer 200 mg daily  1 unit of PRBC given hemoglobin improved to 6 will need another unit of PRBC  Ckd,nephro consult,pt was due to see dr Layton Moy for ckd    Needs EGD but has been difficult to obtain due to delirium, CO2 narcosis, respiratory acidosis, CHF exacerbation  Cardiology nephrology pulmonology following    3/7  Was moved out from ICU last night  Did not wear BiPAP overnight, chest x-ray worsening with bilateral effusions-we will await blood recommendation regarding thoracentesis  ABG did not show hypoxia hypercapnia  Was also started on Seroquel yesterday for hallucination anxiety and insomnia-suspect residual sedation from this; will reevaluate later today  EGD canceled for today      3/11  Patient was extubated on 3/9, mental status back to baseline respiration improved, transferred to floor on 3/10  Underwent thoracentesis on the right on 3/ ml out  Continues to be diuresed with IV Bumex 2mg tid  Has bilateral crackes, cough- needs to continue iv diuresis  CXR tomorrow  EGD will be done outpatiet    3/12  Overall doing well, oxygen requirements reduced to 1.5 L by nasal cannula  Continues on IV Bumex diuresis  Creatinine improving  Hemoglobin better today at 8.9  Repeat echo ordered by cardiology and pending    3/13  Patient continues diuresis, metolazone added to her diuretics, creatinine continues to improve  Hemoglobin 9 today has been uptrending; Eliquis has been resumed today will monitor H&H  Patient appears brighter, in no distress, doing well on 1 L nasal cannula oxygen  Repeat echo to be done tomorrow  Plan discussed with daughter Marylene Prows in room-if H&H is stable tomorrow we can plan on discharging patient back to home tomorrow  Okay to move to [facility] today    3/14  After detailed discussion with daughter and GI team yesterday plan was made to proceed with EGD and colonoscopy

## 2022-03-15 NOTE — OP NOTE
PROCEDURE NOTE    DATE OF PROCEDURE: 3/15/2022     SURGEON: Kristin Peacock MD  Facility: [facility]  ASSISTANT: None  Anesthesia: MAC  PREOPERATIVE DIAGNOSIS:     Anemia  Unexplained melena    Diagnosis:  Biopsies were taken from the small bowel randomly      POSTOPERATIVE DIAGNOSIS: As described below    OPERATION: Upper GI endoscopy with Biopsy    ANESTHESIA: Moderate Sedation     ESTIMATED BLOOD LOSS: Less than 50 ml    COMPLICATIONS: None. SPECIMENS:  Was Obtained: as above     HISTORY: The patient is a 66y.o. year old female with history of above preop diagnosis. I recommended esophagogastroduodenoscopy with possible biopsy and I explained the risk, benefits, expected outcome, and alternatives to the procedure. Risks included but are not limited to bleeding, infection, respiratory distress, hypotension, and perforation of the esophagus, stomach, or duodenum. Patient understands and is in agreement. The patient was counseled at length about the risks of rae Covid-19 during their perioperative period and any recovery window from their procedure. The patient was made aware that rae Covid-19  may worsen their prognosis for recovering from their procedure  and lend to a higher morbidity and/or mortality risk. All material risks, benefits, and reasonable alternatives including postponing the procedure were discussed. The patient does wish to proceed with the procedure at this time. PROCEDURE: The patient was given IV conscious sedation. The patient's SPO2 remained above 90% throughout the procedure. The gastroscope was inserted orally and advanced under direct vision through the esophagus, through the stomach, through the pylorus, and into the descending duodenum. Post sedation note : The patient's SPO2 remained above 90% throughout the procedure. the vital signs remained stable , and no immediate complication form the procedure noted, patient will be ready for d/c when criteria is met . Findings:    Retropharyngeal area was grossly normal appearing    Esophagus: normal    Stomach:    Fundus: normal    Body: normal    Antrum: normal    Duodenum:     Descending: abnormal: Biopsies were taken from the small bowel randomly    Bulb: abnormal: Biopsies were taken from the small bowel randomly    The scope was removed and the patient tolerated the procedure well. Recommendations/Plan:   1. F/U Biopsies  2. F/U In Office in 3-4 weeks  3.  Discussed with the family    Electronically signed by Rita Grimes MD  on 3/15/2022 at 1:32 PM

## 2022-03-15 NOTE — PROGRESS NOTES
Physical Therapy        Physical Therapy Cancel Note      DATE: 3/15/2022    NAME: Andrew Lundberg  MRN: [id number]   : 1950      Patient not seen this date for Physical Therapy due to:    Surgery/Procedure: Ready to head to surgery for endoscopy and colonoscopy.       Electronically signed by Meseret Cardona PTA on 3/15/2022 at 10:55 AM

## 2022-03-15 NOTE — PROGRESS NOTES
Pulmonary Progress Note  [facility]      Patient - Meet Butler,  Age - 66 y.o.    - 1950      Room Number - 2965/8361-55   N -  [id number]   [facility]  Date of Admission -  3/1/2022  9:47 PM    Consulting Joe Moreno MD  Primary Care Physician - Isabel Robertson MD     SUBJECTIVE   Patient is status post EGD and colonoscopy. She looks stable all things considered. On O2 2 L nasal cannula    O2 saturations on 2 L in the 90s    OBJECTIVE   VITALS    height is 4' 11\" (1.499 m) and weight is 132 lb (59.9 kg). Her temperature is 97.9 °F (36.6 °C). Her blood pressure is 138/45 (abnormal) and her pulse is 73. Her respiration is 18 and oxygen saturation is 97%. Body mass index is 26.66 kg/m². Temperature Range: Temp: 97.9 °F (36.6 °C) Temp  Av °F (36.7 °C)  Min: 97.3 °F (36.3 °C)  Max: 98.6 °F (37 °C)  BP Range:  Systolic (84ILC), QGJ:180 , Min:98 , GLH:422     Diastolic (20EAH), XHN:24, Min:30, Max:66    Pulse Range: Pulse  Av.3  Min: 71  Max: 92  Respiration Range: Resp  Av.9  Min: 16  Max: 23  Current Pulse Ox[de-identified]  SpO2: 97 %  24HR Pulse Ox Range:  SpO2  Av.6 %  Min: 81 %  Max: 100 %  Oxygen Amount and Delivery: O2 Flow Rate (L/min): 3 L/min    Wt Readings from Last 3 Encounters:   03/15/22 132 lb (59.9 kg)   22 156 lb 1.4 oz (70.8 kg)   22 143 lb 6.4 oz (65 kg)     I/O (24 Hours)    Intake/Output Summary (Last 24 hours) at 3/15/2022 1454  Last data filed at 3/15/2022 1431  Gross per 24 hour   Intake 3329.48 ml   Output 250 ml   Net 3079.48 ml       EXAM     General Appearance  Awake, alert, oriented, in no acute distress  HEENT - normocephalic, atraumatic. Neck - Supple,  trachea midline   Lungs -coarse breath sounds with crackles posteriorly  Heart Exam:PMI normal. No lifts, heaves, or thrills. RRR. No murmurs, clicks, gallops, or rubs  Abdomen Exam: Abdomen soft, non-tender.  BS normal. No masses  Extremity Exam: No signs of cyanosis    MEDS      potassium chloride  20 mEq Oral Once    apixaban  5 mg Oral BID    amiodarone  200 mg Oral Daily    metoprolol tartrate  50 mg Oral BID    bumetanide  2 mg IntraVENous TID    ipratropium-albuterol  1 ampule Inhalation Q4H    sodium chloride flush  5-40 mL IntraVENous 2 times per day    allopurinol  300 mg Oral Daily    atorvastatin  40 mg Oral Daily    magnesium oxide  400 mg Oral BID    miconazole   Topical BID    therapeutic multivitamin-minerals  1 tablet Oral Daily    pantoprazole  40 mg IntraVENous Daily    And    sodium chloride flush  10 mL IntraVENous Daily      sodium chloride 25 mL (03/12/22 1048)    sodium chloride       benzonatate, dextromethorphan, sodium chloride flush, sodium chloride, ondansetron **OR** ondansetron, magnesium hydroxide, acetaminophen **OR** acetaminophen, sodium chloride    LABS   CBC   Recent Labs     03/15/22  0540   WBC 5.2   HGB 8.9*   HCT 27.6*   MCV 92.7        BMP:   Lab Results   Component Value Date     03/15/2022    K 3.4 03/15/2022    CL 95 03/15/2022    CO2 35 03/15/2022    BUN 56 03/15/2022    LABALBU 2.3 03/05/2022    CREATININE 1.53 03/15/2022    CALCIUM 8.5 03/15/2022    GFRAA 40 03/15/2022    LABGLOM 33 03/15/2022     ABGs:  Lab Results   Component Value Date    PHART 7.459 03/09/2022    PO2ART 107.0 03/09/2022    HAE1SVL 39.9 03/09/2022      Lab Results   Component Value Date    MODE PRVC 03/09/2022     Ionized Calcium:  No results found for: IONCA  Magnesium:    Lab Results   Component Value Date    MG 2.0 03/15/2022     Phosphorus:    Lab Results   Component Value Date    PHOS 3.7 03/08/2022        LIVER PROFILE No results for input(s): AST, ALT, LIPASE, BILIDIR, BILITOT, ALKPHOS in the last 72 hours. Invalid input(s): AMYLASE,  ALB  INR No results for input(s): INR in the last 72 hours.   PTT   Lab Results   Component Value Date    APTT 34.9 02/06/2022         RADIOLOGY (See actual reports for details)    ASSESSMENT/PLAN     Patient Active Problem List   Diagnosis    Vitamin D deficiency    Venous insufficiency of both lower extremities    Asthma    Type 2 diabetes mellitus with stage 3 chronic kidney disease, without long-term current use of insulin [facility] [address]    Coronary artery disease involving native coronary artery of native heart without angina pectoris    Iron deficiency anemia    Stage 3 chronic kidney disease ([facility] [address]    Colonoscopy refused    Pneumococcal vaccination declined    Impaired hearing    Chronic diastolic CHF (congestive heart failure) ([facility] [address]    COPD (chronic obstructive pulmonary disease) ([email or web address]    HTN. Benign hypertension with CKD (chronic kidney disease) stage III (HCC)    Gout with tophi    Hyperlipidemia with target LDL less than 70    Dry skin dermatitis HANDS    Meningioma, cerebral (HCC)    Paroxysmal atrial fibrillation (HCC)    Influenza vaccination declined    Recurrent UTI    Elevated LFTs    Hypomagnesemia    Kidney stones    Diverticulosis    COVID-19 vaccination declined    Acute kidney injury ([email or web address]    Acute on chronic combined systolic (congestive) and diastolic (congestive) heart failure (HCC)    Chronic a-fib (HCC)    Chronic venous stasis dermatitis of both lower extremities    Symptomatic anemia    Family history of colon cancer    Family history of pancreatic cancer    Mild malnutrition ([facility] [address]     Acute respiratory failure, hypoxic and hypercapnic, intubated 3/7; extubated 3/9  Acute on chronic diastolic heart failure with worsening bilateral effusions; status post right thoracentesis 3/10-transudative  COPD, clinical diagnosis no PFTs, no pulmonologist  History of tobacco use 30+ pack year  Nonmorbid obesity  AZIZA clinical diagnosis  Chronic kidney disease  Anemia  Full CODE STATUS    Patient is status post EGD colonoscopy. Continue on Bumex we will check respiratory panel tomorrow morning.   Wean O2 down as tolerated    Hopefully discharge soon  Electronically signed by Mary Lynch MD on 3/15/2022 at 2:54 PM

## 2022-03-15 NOTE — CARE COORDINATION
ONGOING DISCHARGE PLAN:    Patient is alert and oriented x4. Spoke with patient regarding discharge plan and patient confirms that plan is still home with JOHANNA Mcarthur. Pt going for EGD/Colonoscopy today. Hgb 8.9    Creat 1.53, BUN 56 On IV bumex TID, Metolazone 5mg daily for three days per Nephro. Will continue to follow for additional discharge needs.     Electronically signed by Renetta Rivera RN on 3/15/2022 at 9:15 AM

## 2022-03-15 NOTE — PLAN OF CARE
Problem: Skin Integrity:  Goal: Will show no infection signs and symptoms  Description: Will show no infection signs and symptoms  Outcome: Ongoing     Problem: Skin Integrity:  Goal: Absence of new skin breakdown  Description: Absence of new skin breakdown  3/15/2022 0342 by Marizol Gale RN  Outcome: Ongoing     Problem: Falls - Risk of:  Goal: Will remain free from falls  Description: Will remain free from falls  Outcome: Ongoing     Problem: Falls - Risk of:  Goal: Absence of physical injury  Description: Absence of physical injury  Outcome: Ongoing     Problem: Nutrition  Goal: Optimal nutrition therapy  Outcome: Ongoing     Problem: Pain:  Goal: Pain level will decrease  Description: Pain level will decrease  Outcome: Ongoing  Goal: Control of acute pain  Description: Control of acute pain  Outcome: Ongoing  Goal: Control of chronic pain  Description: Control of chronic pain  Outcome: Ongoing     Problem: OXYGENATION/RESPIRATORY FUNCTION  Goal: Patient will maintain patent airway  Outcome: Ongoing  Goal: Patient will achieve/maintain normal respiratory rate/effort  Description: Respiratory rate and effort will be within normal limits for the patient  Outcome: Ongoing

## 2022-03-15 NOTE — ANESTHESIA POSTPROCEDURE EVALUATION
POST- ANESTHESIA EVALUATION       Pt Name: Bessy Carroll  MRN: [id number]  YOB: 1950  Date of evaluation: 3/15/2022  Time:  2:40 PM      BP (!) 124/40   Pulse 72   Temp 98.2 °F (36.8 °C)   Resp 19   Ht 4' 11\" (1.499 m)   Wt 132 lb (59.9 kg)   SpO2 100%   BMI 26.66 kg/m²      Consciousness Level  Awake  Cardiopulmonary Status  Stable  Pain Adequately Treated YES  Nausea / Vomiting  NO  Adequate Hydration  YES  Anesthesia Related Complications NONE      Electronically signed by Allie Chapin MD on 3/15/2022 at 2:40 PM       Department of Anesthesiology  Postprocedure Note    Patient: Bessy Carroll  MRN: [id number]  YOB: 1950  Date of evaluation: 3/15/2022  Time:  2:40 PM     Procedure Summary     Date: 03/15/22 Room / Location: [place] [address] / [facility]    Anesthesia Start: 1321 Anesthesia Stop: 1351    Procedures:       EGD BIOPSY (N/A Esophagus)      COLONOSCOPY DIAGNOSTIC (N/A Anus) Diagnosis:       (IRON DEFICIENCY ANEMIA)      (COVID NEG 3/1)    Surgeons: Shanta Jorge MD Responsible Provider: Allie Chapin MD    Anesthesia Type: general ASA Status: 3          Anesthesia Type: general    Royer Phase I: Royer Score: 8    Royer Phase II:      Last vitals: Reviewed and per EMR flowsheets.        Anesthesia Post Evaluation

## 2022-03-15 NOTE — PLAN OF CARE
Problem: Skin Integrity:  Goal: Will show no infection signs and symptoms  Description: Will show no infection signs and symptoms  3/15/2022 1548 by Kassandra Reeves RN  Outcome: Ongoing  3/15/2022 0342 by Mohamud Caceres RN  Outcome: Ongoing  Goal: Absence of new skin breakdown  Description: Absence of new skin breakdown  3/15/2022 1548 by Kassandra Reeves RN  Outcome: Ongoing  Note: Redness to buttocks /anal area due to prep for colonoscopy  3/15/2022 0342 by Mohamud Caceres RN  Outcome: Ongoing     Problem: Falls - Risk of:  Goal: Will remain free from falls  Description: Will remain free from falls  3/15/2022 1548 by Kassandra Reeves RN  Outcome: Ongoing  3/15/2022 0342 by Mohamud Caceres RN  Outcome: Ongoing  Goal: Absence of physical injury  Description: Absence of physical injury  3/15/2022 1548 by Kassandra Reeves RN  Outcome: Ongoing  Note: Pt alert able to verbalize her needs ,alert call light within reach  3/15/2022 0342 by Mohamud Caceres RN  Outcome: Ongoing     Problem: Nutrition  Goal: Optimal nutrition therapy  3/15/2022 1548 by Kassandra Reeves RN  Outcome: Ongoing  Note: Tolerates fluids to have regular diet at supper  3/15/2022 0342 by Mohamud Caceres RN  Outcome: Ongoing     Problem: Pain:  Description: Pain management should include both nonpharmacologic and pharmacologic interventions.   Goal: Pain level will decrease  Description: Pain level will decrease  3/15/2022 1548 by Kassandra Reeves RN  Outcome: Ongoing  3/15/2022 0342 by Mohamud Caceres RN  Outcome: Ongoing  Goal: Control of acute pain  Description: Control of acute pain  3/15/2022 1548 by Kassandra Reeves RN  Outcome: Ongoing  Note: Tylenol given for relief of headache  3/15/2022 0342 by Mohamud Caceres RN  Outcome: Ongoing  Goal: Control of chronic pain  Description: Control of chronic pain  3/15/2022 1548 by Kassandra Reeves RN  Outcome: Ongoing  3/15/2022 0342 by Mohamud Caceres RN  Outcome: Ongoing     Problem: OXYGENATION/RESPIRATORY FUNCTION  Goal: Patient will maintain patent airway  3/15/2022 1548 by Minal Finch RN  Outcome: Ongoing  3/15/2022 0342 by Colin Clemons RN  Outcome: Ongoing  Goal: Patient will achieve/maintain normal respiratory rate/effort  Description: Respiratory rate and effort will be within normal limits for the patient  3/15/2022 1548 by Minal Finch RN  Outcome: Ongoing  3/15/2022 0342 by Colin Clemons RN  Outcome: Ongoing

## 2022-03-15 NOTE — OP NOTE
colon: normal    Descending/Sigmoid colon: abnormal: Diverticulosis    Rectum/Anus: examined in normal and retroflexed positions and was abnormal: 1 spot of hemorrhagic looks like trauma from probably enema ? ? Withdrawal Time was (minutes): 10    The colon was decompressed and the scope was removed. The patient tolerated the procedure well. Recommendations/Plan:   1. Capsule endoscopy as an outpatient  2. H&H and iron monitoring with infusion/transfusion  3.  Restart anticoagulation with close monitoring    Electronically signed by Marlene Velasco MD  on 3/15/2022 at 1:46 PM

## 2022-03-16 ENCOUNTER — APPOINTMENT (OUTPATIENT)
Dept: GENERAL RADIOLOGY | Age: 72
DRG: 377 | End: 2022-03-16
Payer: OTHER GOVERNMENT

## 2022-03-16 LAB
ANION GAP SERPL CALCULATED.3IONS-SCNC: 10 MMOL/L (ref 9–17)
BUN BLDV-MCNC: 59 MG/DL (ref 8–23)
CALCIUM SERPL-MCNC: 8.3 MG/DL (ref 8.6–10.4)
CHLORIDE BLD-SCNC: 95 MMOL/L (ref 98–107)
CO2: 37 MMOL/L (ref 20–31)
CREAT SERPL-MCNC: 1.83 MG/DL (ref 0.5–0.9)
CULTURE: ABNORMAL
DIRECT EXAM: ABNORMAL
GFR AFRICAN AMERICAN: 33 ML/MIN
GFR NON-AFRICAN AMERICAN: 27 ML/MIN
GFR SERPL CREATININE-BSD FRML MDRD: ABNORMAL ML/MIN/{1.73_M2}
GLUCOSE BLD-MCNC: 127 MG/DL (ref 65–105)
GLUCOSE BLD-MCNC: 153 MG/DL (ref 65–105)
GLUCOSE BLD-MCNC: 179 MG/DL (ref 65–105)
GLUCOSE BLD-MCNC: 77 MG/DL (ref 65–105)
GLUCOSE BLD-MCNC: 82 MG/DL (ref 70–99)
HCT VFR BLD CALC: 28 % (ref 36–46)
HEMOGLOBIN: 8.8 G/DL (ref 12–16)
Lab: ABNORMAL
MAGNESIUM: 2 MG/DL (ref 1.6–2.6)
MCH RBC QN AUTO: 29.3 PG (ref 26–34)
MCHC RBC AUTO-ENTMCNC: 31.6 G/DL (ref 31–37)
MCV RBC AUTO: 92.9 FL (ref 80–100)
PDW BLD-RTO: 17.2 % (ref 11.5–14.9)
PLATELET # BLD: 208 K/UL (ref 150–450)
PMV BLD AUTO: 7.9 FL (ref 6–12)
POTASSIUM SERPL-SCNC: 3.9 MMOL/L (ref 3.7–5.3)
RBC # BLD: 3.01 M/UL (ref 4–5.2)
SODIUM BLD-SCNC: 142 MMOL/L (ref 135–144)
SPECIMEN DESCRIPTION: ABNORMAL
WBC # BLD: 5.4 K/UL (ref 3.5–11)

## 2022-03-16 PROCEDURE — 6370000000 HC RX 637 (ALT 250 FOR IP): Performed by: INTERNAL MEDICINE

## 2022-03-16 PROCEDURE — 94761 N-INVAS EAR/PLS OXIMETRY MLT: CPT

## 2022-03-16 PROCEDURE — 6360000002 HC RX W HCPCS: Performed by: INTERNAL MEDICINE

## 2022-03-16 PROCEDURE — 2580000003 HC RX 258: Performed by: INTERNAL MEDICINE

## 2022-03-16 PROCEDURE — APPSS30 APP SPLIT SHARED TIME 16-30 MINUTES: Performed by: NURSE PRACTITIONER

## 2022-03-16 PROCEDURE — 80048 BASIC METABOLIC PNL TOTAL CA: CPT

## 2022-03-16 PROCEDURE — 85027 COMPLETE CBC AUTOMATED: CPT

## 2022-03-16 PROCEDURE — 82947 ASSAY GLUCOSE BLOOD QUANT: CPT

## 2022-03-16 PROCEDURE — 2700000000 HC OXYGEN THERAPY PER DAY

## 2022-03-16 PROCEDURE — 71046 X-RAY EXAM CHEST 2 VIEWS: CPT

## 2022-03-16 PROCEDURE — 94762 N-INVAS EAR/PLS OXIMTRY CONT: CPT

## 2022-03-16 PROCEDURE — 99232 SBSQ HOSP IP/OBS MODERATE 35: CPT | Performed by: INTERNAL MEDICINE

## 2022-03-16 PROCEDURE — 36415 COLL VENOUS BLD VENIPUNCTURE: CPT

## 2022-03-16 PROCEDURE — 97116 GAIT TRAINING THERAPY: CPT

## 2022-03-16 PROCEDURE — 1200000000 HC SEMI PRIVATE

## 2022-03-16 PROCEDURE — 94640 AIRWAY INHALATION TREATMENT: CPT

## 2022-03-16 PROCEDURE — C9113 INJ PANTOPRAZOLE SODIUM, VIA: HCPCS | Performed by: INTERNAL MEDICINE

## 2022-03-16 PROCEDURE — 2500000003 HC RX 250 WO HCPCS: Performed by: INTERNAL MEDICINE

## 2022-03-16 PROCEDURE — 83735 ASSAY OF MAGNESIUM: CPT

## 2022-03-16 PROCEDURE — 97110 THERAPEUTIC EXERCISES: CPT

## 2022-03-16 RX ORDER — AMIODARONE HYDROCHLORIDE 200 MG/1
200 TABLET ORAL DAILY
Qty: 30 TABLET | Refills: 0 | Status: SHIPPED | OUTPATIENT
Start: 2022-03-17 | End: 2022-04-11 | Stop reason: SDUPTHER

## 2022-03-16 RX ORDER — AMIODARONE HYDROCHLORIDE 200 MG/1
200 TABLET ORAL DAILY
Qty: 30 TABLET | Refills: 0 | Status: SHIPPED | OUTPATIENT
Start: 2022-03-17 | End: 2022-03-16

## 2022-03-16 RX ADMIN — IPRATROPIUM BROMIDE AND ALBUTEROL SULFATE 1 AMPULE: 2.5; .5 SOLUTION RESPIRATORY (INHALATION) at 19:05

## 2022-03-16 RX ADMIN — METOPROLOL TARTRATE 50 MG: 50 TABLET, FILM COATED ORAL at 08:18

## 2022-03-16 RX ADMIN — ACETAMINOPHEN 650 MG: 325 TABLET, FILM COATED ORAL at 08:35

## 2022-03-16 RX ADMIN — ACETAMINOPHEN 650 MG: 325 TABLET, FILM COATED ORAL at 15:56

## 2022-03-16 RX ADMIN — SODIUM CHLORIDE, PRESERVATIVE FREE 10 ML: 5 INJECTION INTRAVENOUS at 08:30

## 2022-03-16 RX ADMIN — APIXABAN 5 MG: 5 TABLET, FILM COATED ORAL at 08:18

## 2022-03-16 RX ADMIN — ANTI-FUNGAL POWDER MICONAZOLE NITRATE TALC FREE: 1.42 POWDER TOPICAL at 08:27

## 2022-03-16 RX ADMIN — ALLOPURINOL 300 MG: 300 TABLET ORAL at 08:18

## 2022-03-16 RX ADMIN — IPRATROPIUM BROMIDE AND ALBUTEROL SULFATE 1 AMPULE: 2.5; .5 SOLUTION RESPIRATORY (INHALATION) at 15:15

## 2022-03-16 RX ADMIN — MAGNESIUM OXIDE TAB 400 MG (241.3 MG ELEMENTAL MG) 400 MG: 400 (241.3 MG) TAB at 08:18

## 2022-03-16 RX ADMIN — IPRATROPIUM BROMIDE AND ALBUTEROL SULFATE 1 AMPULE: 2.5; .5 SOLUTION RESPIRATORY (INHALATION) at 23:13

## 2022-03-16 RX ADMIN — MULTIPLE VITAMINS W/ MINERALS TAB 1 TABLET: TAB at 08:18

## 2022-03-16 RX ADMIN — APIXABAN 2.5 MG: 2.5 TABLET, FILM COATED ORAL at 21:01

## 2022-03-16 RX ADMIN — IPRATROPIUM BROMIDE AND ALBUTEROL SULFATE 1 AMPULE: 2.5; .5 SOLUTION RESPIRATORY (INHALATION) at 07:00

## 2022-03-16 RX ADMIN — BUMETANIDE 2 MG: 0.25 INJECTION, SOLUTION INTRAMUSCULAR; INTRAVENOUS at 08:18

## 2022-03-16 RX ADMIN — MAGNESIUM OXIDE TAB 400 MG (241.3 MG ELEMENTAL MG) 400 MG: 400 (241.3 MG) TAB at 21:00

## 2022-03-16 RX ADMIN — IPRATROPIUM BROMIDE AND ALBUTEROL SULFATE 1 AMPULE: 2.5; .5 SOLUTION RESPIRATORY (INHALATION) at 10:56

## 2022-03-16 RX ADMIN — AMIODARONE HYDROCHLORIDE 200 MG: 200 TABLET ORAL at 08:18

## 2022-03-16 RX ADMIN — ATORVASTATIN CALCIUM 40 MG: 40 TABLET, FILM COATED ORAL at 08:18

## 2022-03-16 RX ADMIN — SODIUM CHLORIDE, PRESERVATIVE FREE 10 ML: 5 INJECTION INTRAVENOUS at 08:19

## 2022-03-16 RX ADMIN — METOPROLOL TARTRATE 50 MG: 50 TABLET, FILM COATED ORAL at 21:00

## 2022-03-16 RX ADMIN — PANTOPRAZOLE SODIUM 40 MG: 40 INJECTION, POWDER, FOR SOLUTION INTRAVENOUS at 08:18

## 2022-03-16 ASSESSMENT — PAIN DESCRIPTION - LOCATION
LOCATION: BACK
LOCATION: HEAD

## 2022-03-16 ASSESSMENT — PAIN SCALES - GENERAL
PAINLEVEL_OUTOF10: 3
PAINLEVEL_OUTOF10: 0
PAINLEVEL_OUTOF10: 3

## 2022-03-16 ASSESSMENT — PAIN DESCRIPTION - PAIN TYPE: TYPE: ACUTE PAIN

## 2022-03-16 NOTE — PROGRESS NOTES
Perfect serve to pulmonary NP regarding patients insurance thru [place] and inability to contact them to set up home o2. Patient to stay til tomorrow when issue can be resolved.

## 2022-03-16 NOTE — PLAN OF CARE
Problem: Skin Integrity:  Goal: Will show no infection signs and symptoms  Description: Will show no infection signs and symptoms  Outcome: Ongoing     Problem: Skin Integrity:  Goal: Absence of new skin breakdown  Description: Absence of new skin breakdown  3/16/2022 1828 by Vidya Gillis RN  Outcome: Ongoing     Problem: Falls - Risk of:  Goal: Will remain free from falls  Description: Will remain free from falls  3/16/2022 1828 by Vidya Gillis RN  Outcome: Ongoing     Problem: Falls - Risk of:  Goal: Absence of physical injury  Description: Absence of physical injury  Outcome: Ongoing     Problem: Pain:  Goal: Pain level will decrease  Description: Pain level will decrease  Outcome: Ongoing     Problem: Pain:  Goal: Control of acute pain  Description: Control of acute pain  3/16/2022 1828 by Vidya Gillis RN  Outcome: Ongoing     Problem: Pain:  Goal: Control of chronic pain  Description: Control of chronic pain  Outcome: Ongoing     Problem: OXYGENATION/RESPIRATORY FUNCTION  Goal: Patient will maintain patent airway  Outcome: Ongoing     Problem: OXYGENATION/RESPIRATORY FUNCTION  Goal: Patient will achieve/maintain normal respiratory rate/effort  Description: Respiratory rate and effort will be within normal limits for the patient  Outcome: Ongoing

## 2022-03-16 NOTE — DISCHARGE SUMMARY
[facility] Internal Medicine    Discharge Summary     Patient ID: Haleigh Escalera  :     MRN: [id number]     ACCOUNT:  [de-identified]   Patient's PCP: Maya Johnson MD  Admit Date: 3/1/2022   Discharge Date: 3/16/22  Length of Stay: 14  Code Status:  Full Code  Admitting Physician: Judge Renée MD  Discharge Physician: Judge Renée MD     Active Discharge Diagnoses:     Primary Problem  Acute GI bleeding      Hospital Problems  Active Hospital Problems    Diagnosis Date Noted    Mild malnutrition ([address] [E44.1] 2022    Symptomatic anemia [D64.9]     Family history of colon cancer [Z80.0]     Family history of pancreatic cancer [Z80.0]     Elevated LFTs [R79.89] 2021    Paroxysmal atrial fibrillation ([address] [I48.0] 2020    Stage 3 chronic kidney disease ([address] [N18.30]        Admission Condition:  fair     Discharged Condition: fair    Hospital Stay:     Hospital Course:  Haleigh Escalera is a 66 y.o. female who was admitted for the management of Acute GI bleeding , presented to ER with Fatigue    69-year-old lady with history of atrial fibrillation history of coronary disease stent placed 20 years ago on Eliquis and Plavix 75 baseline hemoglobin was 10 2 weeks ago admitted with gradual increase of fatigue dyspnea exertion sleepy most part of the day no fever no vomiting blood no blood in the stool no dark stools no diarrhea hemoglobin ER was 4.4 suspected GI bleed Eliquis and Plavix held n.p.o. for now upper and lower endoscopies GI consulted  IV Venofer 200 mg daily  1 unit of PRBC given hemoglobin improved to 6 will need another unit of PRBC  Ckd,nephro consult,pt was due to see dr Fredy Lomax for ckd     EGD was difficult to obtain due to delirium, CO2 narcosis, respiratory acidosis, CHF exacerbation patient was transferred to ICU and intubated for airway protection on 3/7  Cardiology nephrology pulmonology following  Transfer out of ICU on 3/9  Underwent thoracentesis right-sided on 3/10  Continue diuresis with IV Bumex  Oxygen requirements gradually improved  Subsequently underwent EGD and colonoscopy on 3/15-both essentially unremarkable    Eliquis was resumed, Plavix continued to be held  Needs cardiology follow-up 2 weeks after discharge also needs follow-up CBC BMP outpatient          Significant therapeutic interventions: As above    Significant Diagnostic Studies:   Labs / Micro:    Lab Results   Component Value Date    WBC 5.4 03/16/2022    HGB 8.8 (L) 03/16/2022    HCT 28.0 (L) 03/16/2022    MCV 92.9 03/16/2022     03/16/2022          Lab Results   Component Value Date     03/16/2022    K 3.9 03/16/2022    CL 95 03/16/2022    CO2 37 03/16/2022    BUN 59 03/16/2022    CREATININE 1.83 03/16/2022    GLUCOSE 82 03/16/2022    CALCIUM 8.3 03/16/2022          Radiology:    ECHO Limited    Result Date: 3/14/2022  [facility] Transthoracic Echocardiography Report (TTE)  Patient Name [id number]       Date of Study               03/14/2022               Tom Miranda   Date of      1950  Gender                      Female  Birth   Age          66 year(s)  Race                           Room Number  2047        Height:                     59 inch, 149.86 cm   Corporate ID [id number]    Weight:                     152 pounds, 68.9 kg  #   Patient Acct [de-identified]   BSA:          1.64 m^2      BMI:      30.7 kg/m^2  #   MR #         [id number]      Sonographer                 Awilda Montoya   Accession #  [id number]  Interpreting Physician      [address] [place] [address]   Fellow                   Referring Nurse                           Practitioner   Interpreting             Referring Physician         [address]  Fellow  Type of Study   TTE procedure:2D Echocardiogram, Limited Echo.   Procedure Date Date: 03/14/2022 Start: 07:30 AM Study Location: [facility] Technical Quality: Fair visualization Indications:Congestive heart failure. Patient Status: Inpatient Height: 59 inches Weight: 152 pounds BSA: 1.64 m^2 BMI: 30.7 kg/m^2 Rhythm: Within normal limits HR: 88 bpm Allergies   - Lisinopril. - Keflex. - *Unlisted:(Aleve,Claratin, Pioglitazone). CONCLUSIONS Summary Limited study to assess CHF. Left ventricle is normal in size. Global left ventricular systolic function is normal. Estimated LV EF 60-65 %. No obvious wall motion abnormality seen. Left atrium is mildly dilated. Signature ----------------------------------------------------------------------------  Electronically signed by Awilda Montoya(Sonographer) on 03/14/2022 12:15  PM ---------------------------------------------------------------------------- ----------------------------------------------------------------------------  Electronically signed by Jann Duong(Interpreting physician) on 03/14/2022  12:16 PM ---------------------------------------------------------------------------- FINDINGS Left Atrium Left atrium is mildly dilated. Left Ventricle Left ventricle is normal in size. Global left ventricular systolic function is normal. Estimated LV EF 60-65 %. No obvious wall motion abnormality seen. Right Atrium Right atrium is normal in size. Right Ventricle Right ventricle was not well visualized. Mitral Valve Mild calcification of the posterior mitral annulus. Aortic Valve Aortic valve is sclerotic but opens well. Tricuspid Valve Tricuspid valve was not well visualized. Pulmonic Valve Pulmonic valve was not well visualized. Pericardial Effusion No significant pericardial effusion is seen. Pleural Effusion No pleural effusion seen.     XR CHEST (SINGLE VIEW FRONTAL)    Result Date: 3/12/2022  EXAMINATION: ONE XRAY VIEW OF THE CHEST 3/12/2022 7:16 am COMPARISON: 10 March 2022 HISTORY: ORDERING SYSTEM PROVIDED HISTORY: Evaluate effusions TECHNOLOGIST PROVIDED HISTORY: Evalaute effusions Reason for Exam: Evaluate effusions FINDINGS: AP portable view of the chest time stamped at 730 hours demonstrates overlying cardiac monitoring electrodes and prior median sternotomy. Cardiomegaly is noted, mild with bilateral effusions, and bibasilar opacities. The opacities have increased over prior study. Vascularity is top normal.     Cardiomegaly, bilateral effusions and increasing bibasilar opacities which may be related atelectasis 2 are effusion. No extrapleural air. XR CHEST (SINGLE VIEW FRONTAL)    Result Date: 3/7/2022  EXAMINATION: ONE XRAY VIEW OF THE CHEST 3/7/2022 6:10 am COMPARISON: 03/05/2022 HISTORY: ORDERING SYSTEM PROVIDED HISTORY: sob, chf, effusions TECHNOLOGIST PROVIDED HISTORY: sob, chf, effusions Reason for Exam: dyspnea FINDINGS: Sternal wires from prior heart surgery. Monitor leads overlie the chest.  A metallic clip projecting over the right lower chest is probably external to the patient. Stable cardiomegaly with calcific plaque of the thoracic aorta. Increasing perihilar congestive changes with hilar prominence which may all be vascular. Increasing small to moderate pleural effusions and bibasilar opacities which may be due to edema, atelectasis, or infection. The bones are osteopenic with degenerative changes. Calcific plaque of the thoracic aorta     Increasing congestive changes and small to moderate bilateral pleural effusions with adjacent airspace disease. XR CHEST (2 VW)    Result Date: 3/3/2022  EXAMINATION: TWO XRAY VIEWS OF THE CHEST 3/3/2022 4:40 pm COMPARISON: Chest 03/01/2022 HISTORY: Cough, CHF FINDINGS: The cardiac silhouette is enlarged. Calcifications involving the aorta reflect atherosclerosis. The mediastinal and hilar silhouettes appear unremarkable. Chronic appearing coarse interstitial densities predominate perihilar regions and lung bases, typical of sequela from smoking or other previous infectious/inflammatory process.   The lungs are hyperinflated with increased translucency both lung apices and tapering of the vasculature in the upper lungs. Vascular engorgement and cephalization is demonstrated with bilateral peribronchial cuffing and perivascular haziness. Small volume bilateral pleural effusion evident with bibasilar opacities. No pneumothorax is seen. Bone mineralization appears abnormally low. Hyperkyphosis thoracic spine. No acute osseous abnormality is identified. Stable sequela from open-heart surgery. 1.  Congestive heart failure is most likely given the radiographic findings; pneumonia is also a consideration in areas of consolidation with pleural effusion. 2.  Chronic appearing coarse interstitial densities predominate perihilar regions and lung bases, typical of sequela from smoking or other previous infectious/inflammatory process. 3. Calcific atherosclerosis aorta. 4. Cardiomegaly. CT HEAD WO CONTRAST    Result Date: 3/7/2022  EXAMINATION: CT OF THE HEAD WITHOUT CONTRAST  3/7/2022 1:18 pm TECHNIQUE: CT of the head was performed without the administration of intravenous contrast. Dose modulation, iterative reconstruction, and/or weight based adjustment of the mA/kV was utilized to reduce the radiation dose to as low as reasonably achievable. COMPARISON: CT brain performed 03/04/2022. HISTORY: ORDERING SYSTEM PROVIDED HISTORY: Irregular breathing pattern, unresponsive TECHNOLOGIST PROVIDED HISTORY: Irregular breathing pattern, unresponsive Reason for Exam: Irregular breathing pattern, unresponsive FINDINGS: BRAIN/VENTRICLES: There is no acute intracranial hemorrhage, mass effect, or midline shift. There is satisfactory overall gray-white matter differentiation. There is redemonstration of a meningioma adjacent to the anterior aspect of the right temporal lobe. There is a remote lacunar infarct in the right caudate head. There is cerebral atrophy. The ventricular structures are symmetric and unremarkable. The infratentorial structures are unremarkable.  ORBITS: The visualized portion of the orbits demonstrate no acute abnormality. SINUSES: The visualized paranasal sinuses and mastoid air cells demonstrate no acute abnormality. SOFT TISSUES/SKULL:  No acute abnormality of the visualized skull or soft tissues. Cerebral atrophy without acute intracranial abnormality. Stable meningioma adjacent to the right temporal lobe anteriorly. RECOMMENDATIONS: Unavailable     CT HEAD WO CONTRAST    Result Date: 3/4/2022  EXAMINATION: CT OF THE HEAD WITHOUT CONTRAST  3/4/2022 12:05 pm TECHNIQUE: CT of the head was performed without the administration of intravenous contrast. Dose modulation, iterative reconstruction, and/or weight based adjustment of the mA/kV was utilized to reduce the radiation dose to as low as reasonably achievable. COMPARISON: CT brain performed 05/25/2021. HISTORY: ORDERING SYSTEM PROVIDED HISTORY: delirium TECHNOLOGIST PROVIDED HISTORY: delirium Reason for Exam: delirium FINDINGS: BRAIN/VENTRICLES: There is no acute intracranial hemorrhage, mass effect, or midline shift. There is satisfactory overall gray-white matter differentiation. There is cerebral atrophy. The ventricular structures are symmetric and unremarkable. The infratentorial structures are unremarkable. ORBITS: The visualized portion of the orbits demonstrate no acute abnormality. SINUSES: The visualized paranasal sinuses and mastoid air cells demonstrate no acute abnormality. SOFT TISSUES/SKULL:  No acute abnormality of the visualized skull or soft tissues. Cerebral atrophy without acute intracranial abnormality. US LIVER    Result Date: 3/2/2022  EXAMINATION: RIGHT UPPER QUADRANT ULTRASOUND 3/2/2022 1:45 pm COMPARISON: CT abdomen and pelvis May 25, 2021 HISTORY: ORDERING SYSTEM PROVIDED HISTORY: elevated lft TECHNOLOGIST PROVIDED HISTORY: elevated lft FINDINGS: LIVER:  Diffuse increased liver parenchymal echogenicity is nonspecific but can be seen in patients with hepatic steatosis or diffuse hepatocellular process. .  No focal liver lesion. No intrahepatic biliary ductal dilatation. The portal vein is patent and demonstrates biphasic flow which can be seen in patients with tricuspid regurgitation. BILIARY SYSTEM:  Gallbladder is unremarkable without evidence of pericholecystic fluid, wall thickening or stones. Negative sonographic Graf's sign. Common bile duct is within normal limits measuring 4 mm. RIGHT KIDNEY: Diffuse increased renal parenchymal echogenicity suggestive chronic medical renal.  No right hydronephrosis PANCREAS:  Visualized portions of the pancreas are unremarkable. OTHER: No evidence of right upper quadrant ascites. Diffuse increased heterogeneous liver parenchymal echogenicity could be seen in patients with hepatic steatosis or diffuse hepatocellular process. Patent portal vein with biphasic flow can be seen in patient with tricuspid regurgitation. RECOMMENDATIONS: Unavailable     XR CHEST PORTABLE    Result Date: 3/10/2022  EXAMINATION: ONE XRAY VIEW OF THE CHEST 3/10/2022 11:00 am COMPARISON: Chest x-ray dated 10 March 2022, 0624 hours HISTORY: ORDERING SYSTEM PROVIDED HISTORY: Status post thoracentesis TECHNOLOGIST PROVIDED HISTORY: Status post thoracentesis Reason for Exam: Status post thoracentesis FINDINGS: The patient is rotated. Cardiomediastinal silhouette appears similar with median sternotomy wires. Improved aeration of the right lung with decrease in size of the previously seen pleural effusion. The left-sided pleural effusion and left basilar airspace disease appears similar to prior. No pneumothorax is visible. Interval decrease in size of a right pleural effusion with improved aeration of the right lung. Stable appearance of the left lung. XR CHEST PORTABLE    Result Date: 3/10/2022  EXAMINATION: ONE XRAY VIEW OF THE CHEST 3/10/2022 6:28 am COMPARISON: Chest radiograph performed 03/09/2022.  HISTORY: ORDERING SYSTEM PROVIDED HISTORY: Check lung status TECHNOLOGIST PROVIDED HISTORY: Check lung status Reason for Exam: sob FINDINGS: There are bilateral effusions with scattered infiltrates. There is no pneumothorax. The heart is enlarged. The upper abdomen is unremarkable. The extrathoracic soft tissues are unremarkable. Bilateral effusions with scattered infiltrates consistent with pneumonia. XR CHEST PORTABLE    Result Date: 3/9/2022  EXAMINATION: ONE XRAY VIEW OF THE CHEST 3/9/2022 5:45 am COMPARISON: 03/08/2022 HISTORY: ORDERING SYSTEM PROVIDED HISTORY: ETT placement FINDINGS: Unchanged positioning of endotracheal tube terminating above the kumar. Enteric tube transverses the diaphragm with the tip beyond the field of view. Stable enlargement of the cardiomediastinal silhouette. Status post median sternotomy. Bibasilar edema and pleural effusions again noted, slightly progressed on the right. No sizable pneumothorax. Continued bibasilar edema and pleural effusions, slightly progressed on the right. Life support devices, unchanged. XR CHEST PORTABLE    Result Date: 3/8/2022  EXAMINATION: ONE XRAY VIEW OF THE CHEST 3/8/2022 6:22 am COMPARISON: 03/07/2022 HISTORY: ORDERING SYSTEM PROVIDED HISTORY: ETT placement TECHNOLOGIST PROVIDED HISTORY: ETT placement Reason for Exam: ett placement FINDINGS: Endotracheal tube has been pulled back and is now approximately 2.5 cm above the kumar. Median sternotomy wires and enteric tube are noted. Heart size is stable. Small-to-moderate bilateral pleural effusions and bibasilar opacities are unchanged. No evidence of pneumothorax. 1.  Improved positioning of the endotracheal tube. 2.  Unchanged small-to-moderate pleural effusions and bibasilar opacities.      XR CHEST PORTABLE    Result Date: 3/8/2022  EXAMINATION: ONE XRAY VIEW OF THE CHEST 3/7/2022 12:52 pm COMPARISON: 03/07/2022 HISTORY: ORDERING SYSTEM PROVIDED HISTORY: Endotracheal tube position TECHNOLOGIST PROVIDED HISTORY: Endotracheal tube position Reason for Exam: ET and OG position FINDINGS: The patient has been intubated with the endotracheal tube tip approximately 2 cm into the right main bronchus. This should be retracted at least 4 cm. Oral enteric tube tip is in the stomach. Sternal wires from prior heart surgery. Again shown is a metallic clip overlying the right lower chest which is probably external to the patient. Stable cardiomegaly with congestive changes, small to moderate bilateral pleural effusions and adjacent atelectasis/airspace disease. Endotracheal tube tip is in the right main bronchus and should be retracted approximately 4 cm. OG tube tip is in the stomach. Findings were discussed with Chelo CARBALLO RN (will convey the results to Dr. Maya Kendrick) at 2:04 pm on 3/7/2022. XR CHEST PORTABLE    Result Date: 3/5/2022  EXAMINATION: ONE XRAY VIEW OF THE CHEST 3/5/2022 6:29 am COMPARISON: 3/4/2022 HISTORY: Acute respiratory failure. FINDINGS: Evaluation is suboptimal secondary to body habitus and portable technique. In addition, the patient's chin overlies the lung apices. Sternotomy changes. Cardiomediastinal silhouette remains enlarged. No significant change in bibasilar opacities compatible with pleural effusions and adjacent airspace disease. No pneumothorax seen. No significant change. XR CHEST PORTABLE    Result Date: 3/4/2022  EXAMINATION: ONE XRAY VIEW OF THE CHEST 3/4/2022 10:59 am COMPARISON: Chest x-ray dated 3 March 2022 HISTORY: ORDERING SYSTEM PROVIDED HISTORY: dyspnea TECHNOLOGIST PROVIDED HISTORY: dyspnea Reason for Exam: dyspnea FINDINGS: Stable cardiomegaly. No pneumothorax. Progression of perihilar and bibasilar airspace opacities. Small right pleural effusions.      Findings compatible with congestive heart failure with increased bilateral pleural effusion since the prior study     XR CHEST PORTABLE    Result Date: 3/1/2022  EXAMINATION: ONE XRAY VIEW OF THE CHEST 3/1/2022 7:43 pm COMPARISON: February 6, 2022 HISTORY: [address] PROVIDED HISTORY: fatigue TECHNOLOGIST PROVIDED HISTORY: fatigue Reason for Exam: Fatigue, shortness of breath Additional signs and symptoms: Fatigue, shortness of breath Relevant Medical/Surgical History: Fatigue, shortness of breath FINDINGS: Marginal inspiration is present. Cardiomegaly is present. Vascular markings are engorged. Small pleural effusions are noted. No pneumothorax is present. Patient is status post prior sternotomy. No acute osseous abnormality is present. 1. Cardiomegaly, mild vascular congestion, improved since the prior study 2. Small bilateral pleural effusions     US THORACENTESIS Which side should the procedure be performed? Right    Result Date: 3/10/2022  Radiology exam is complete. No Radiologist dictation. Please follow up with ordering provider. Consultations:    Consults:     Final Specialist Recommendations/Findings:   IP CONSULT TO GI  IP CONSULT TO NEPHROLOGY  IP CONSULT TO PULMONOLOGY  IP CONSULT TO NEUROLOGY  IP CONSULT TO CARDIOLOGY  IP CONSULT TO DIETITIAN  IP CONSULT TO GI      The patient was seen and examined on day of discharge and this discharge summary is in conjunction with any daily progress note from day of discharge. Discharge plan:     Disposition: home      Instructions to Patient: Keep follow-up appointments      Requiring Further Evaluation/Follow Up POST HOSPITALIZATION/Incidental Findings:     Physician Follow Up:     [facility] [address]  [phone]      call to see if you qualify for  any services such as home care, Home healtg aids, Transportation,     [address] [facility], [address], [address]  [address] [facility]  [phone]      3-4 weeks for follow up from EGD/COLONOSCOPY    [address] [place].   [address] [place] [address] [address]  In 2 weeks      Steven Herrera MD  [address] [facility].  [facility]  [address] [facility] [address] [address]  [phone]    Schedule an appointment as soon as possible for a visit  Follow up in 3-4 weeks. Ruth Ann Marie MD  [address] [facility]. [facility] [address]  [phone]    Schedule an appointment as soon as possible for a visit in 3 weeks  egd bx results       Activity: Resume as directed    Discharge Medications:      Medication List      START taking these medications    amiodarone 200 MG tablet  Commonly known as: CORDARONE  Take 1 tablet by mouth daily  Start taking on: March 17, 2022        CHANGE how you take these medications    apixaban 2.5 MG Tabs tablet  Commonly known as: ELIQUIS  Take 1 tablet by mouth 2 times daily  What changed:   · medication strength  · how much to take     vitamin D 1.25 MG (73[address] UT) Caps capsule  Commonly known as: ERGOCALCIFEROL  Take 1 capsule by mouth once a week  What changed: additional instructions        CONTINUE taking these medications    Adjust Fold Cane/York Handle Misc  Use daily for walking     * albuterol (2.5 MG/3ML) 0.083% nebulizer solution  Commonly known as: PROVENTIL  Take 3 mLs by nebulization every 6 hours as needed for Wheezing or Shortness of Breath     * albuterol sulfate  (90 Base) MCG/ACT inhaler  Commonly known as: ProAir HFA  Inhale 2 puffs into the lungs every 6 hours as needed for Wheezing or Shortness of Breath (cough)     allopurinol 300 MG tablet  Commonly known as: ZYLOPRIM  Take 1 tablet by mouth daily Per rheumatologist     atorvastatin 40 MG tablet  Commonly known as: LIPITOR  Take 1 tablet by mouth once daily     blood glucose monitor kit and supplies  1 kit by Other route three times daily Dispense Butterfly Elite CBG Device     Blood Pressure Kit  1 kit by Does not apply route three times daily     bumetanide 1 MG tablet  Commonly known as: BUMEX  Take 1 tablet by mouth 2 times daily     Compressor/Nebulizer Misc  Nebulizer with compressor. Disposable Med Nebs 2 /month. Reusable Med Nebs 1 per 6 months. Aerosol Mask 1 per month. Replacement Filters 2 per month. All other related supplies as needed per month. Medications being used: Albuterol . 083 unit dose vial. Frequency: every 6 hrs x 4/day . Length of Need: 1     desloratadine 5 MG tablet  Commonly known as: CLARINEX  Take 1 tablet by mouth once daily     FreeStyle Lancets Misc  1 each by Does not apply route daily Patient needs to contact office before any further refills will be approved     FREESTYLE LITE strip  Generic drug: blood glucose test strips  1 each by In Vitro route daily As needed. Handicap Placard Misc  by Does not apply route Expires on 12/30/25     Hibiclens 4 % external liquid  Generic drug: chlorhexidine  for decontamination AND WASH LEGS EVERY DAY     magnesium oxide 400 (241.3 Mg) MG Tabs tablet  Commonly known as: MAG-OX  Take 1 tablet by mouth twice daily     metoprolol tartrate 50 MG tablet  Commonly known as: LOPRESSOR  Take 1 tablet by mouth 2 times daily     miconazole 2 % powder  Commonly known as: MICOTIN  Apply topically 2 times daily UNDER THE SKIN FOLDS LONG TERM     mupirocin 2 % cream  Commonly known as: Bactroban  Apply 2 times dailY X 10 DAYS ON OPEN BLISTERS ON THE LEGS. therapeutic multivitamin-minerals tablet  Take 1 tablet by mouth daily     xeroform petrolatum patch 2\"X2\" Pads external pads  Apply 1 each topically daily         * This list has 2 medication(s) that are the same as other medications prescribed for you. Read the directions carefully, and ask your doctor or other care provider to review them with you.             STOP taking these medications    amLODIPine 10 MG tablet  Commonly known as: NORVASC     ciprofloxacin 500 MG tablet  Commonly known as: CIPRO     clopidogrel 75 MG tablet  Commonly known as: PLAVIX     colchicine 0.6 MG tablet  Commonly known as: Colcrys           Where to Get Your Medications      These medications were sent to [address] [facility] [address]  [address] [facility] [address]    Phone: [phone]   · amiodarone 200 MG tablet  · apixaban 2.5 MG Tabs tablet         Time Spent on discharge is  35 mins in patient examination, evaluation, counseling as well as medication reconciliation, prescriptions for required medications, discharge plan and follow up. Electronically signed by   Cynthia Briones MD  3/16/2022  1:24 PM      Thank you Dr. Hue Irby MD for the opportunity to be involved in this patient's care.

## 2022-03-16 NOTE — PROGRESS NOTES
[facility]    Gastroenterology Daily Progress Note      Patient:   Iman Hillman   :    [birth year]   [facility]ility:   [facility]  Date:     3/16/2022  Consultant:   MAL Camargo CNP, CNP      SUBJECTIVE  66 y.o. female admitted 3/1/2022 with Acute GI bleeding [K92.2]  Symptomatic anemia [D64.9] and seen for iron deficiency anemia. The pt was seen and examined. She is s/p egd and colonoscopy yesterday, results are discussed below. No c/o abdominal pain, no bm overnight, has been restarted on AC. hgb 8.8.         OBJECTIVE  Scheduled Meds:   apixaban  5 mg Oral BID    amiodarone  200 mg Oral Daily    metoprolol tartrate  50 mg Oral BID    bumetanide  2 mg IntraVENous TID    ipratropium-albuterol  1 ampule Inhalation Q4H    sodium chloride flush  5-40 mL IntraVENous 2 times per day    allopurinol  300 mg Oral Daily    atorvastatin  40 mg Oral Daily    magnesium oxide  400 mg Oral BID    miconazole   Topical BID    therapeutic multivitamin-minerals  1 tablet Oral Daily    pantoprazole  40 mg IntraVENous Daily    And    sodium chloride flush  10 mL IntraVENous Daily       Vital Signs:  BP (!) 137/52   Pulse 70   Temp 98.4 °F (36.9 °C)   Resp 16   Ht 4' 11\" (1.499 m)   Wt 132 lb (59.9 kg)   SpO2 93%   BMI 26.66 kg/m²      Physical Exam:     General Appearance: alert and oriented to person, place and time, well-developed and well-nourished, in no acute distress  Skin: warm and dry, no rash or erythema  Head: normocephalic and atraumatic  Eyes: pupils equal, round, and reactive to light, extraocular eye movements intact, conjunctivae normal  ENT: hearing grossly normal bilaterally  Neck: neck supple and non tender without mass, no thyromegaly or thyroid nodules, no cervical lymphadenopathy   Pulmonary/Chest: clear to auscultation bilaterally- no wheezes, rales or rhonchi, normal air movement, no respiratory distress  Cardiovascular: normal rate, regular rhythm, normal S1 and S2, no murmurs, rubs, clicks or gallops, distal pulses intact, no carotid bruits  Abdomen: soft, non-tender, obese non-distended, normal bowel sounds, no masses or organomegaly  Extremities: no cyanosis, clubbing or edema  Musculoskeletal: normal range of motion, no joint swelling, deformity or tenderness  Neurologic: no cranial nerve deficit and muscle strength normal    Lab and Imaging Review     CBC  Recent Labs     03/14/22  0648 03/15/22  0540 03/16/22  0707   WBC 5.2 5.2 5.4   HGB 9.0* 8.9* 8.8*   HCT 28.0* 27.6* 28.0*   MCV 93.3 92.7 92.9    223 208       BMP  Recent Labs     03/14/22  0648 03/15/22  0540 03/16/22  0707    142 142   K 3.8 3.4* 3.9   CL 96* 95* 95*   CO2 37* 35* 37*   BUN 60* 56* 59*   CREATININE 1.35* 1.53* 1.83*   GLUCOSE 93 85 82   CALCIUM 8.5* 8.5* 8.3*     The prep was good. colonoscopy dr Delmis Brumfield 3/15/22     Findings:  Terminal ileum: normal     Cecum/Ascending colon: normal     Transverse colon: normal     Descending/Sigmoid colon: abnormal: Diverticulosis     Rectum/Anus: examined in normal and retroflexed positions and was abnormal: 1 spot of hemorrhagic looks like trauma from probably enema ? ? Findings:egd dr Delmis Brumfield 3/15/22     Retropharyngeal area was grossly normal appearing     Esophagus: normal     Stomach:    Fundus: normal    Body: normal    Antrum: normal     Duodenum:     Descending: abnormal: Biopsies were taken from the small bowel randomly    Bulb: abnormal: Biopsies were taken from the small bowel randomly    Results for Aida Rummer (MRN [id number]) as of 3/14/2022 11:04    Ref. Range 3/3/2022 17:25   Alk Phos,Bone Specific Latest Ref Range: 0 - 55 U/L 128 (H)   Results for Aida Rummer (MRN [id number]) as of 3/14/2022 11:04    Ref. Range 3/3/2022 17:25   Alk Phosphatase Latest Ref Range: 40 - 120 U/L 200 (H)   Results for Aida Rummer (MRN [id number]) as of 3/14/2022 11:04    Ref.  Range 3/3/2022 17:25   Alk Phos Liver Fract Latest Ref Range: 0 - 94 U/L 72      US LIVER     Result Date: 3/2/2022  EXAMINATION: RIGHT UPPER QUADRANT ULTRASOUND 3/2/2022 1:45 pm  FINDINGS: LIVER:  Diffuse increased liver parenchymal echogenicity is nonspecific but can be seen in patients with hepatic steatosis or diffuse hepatocellular process. .  No focal liver lesion.  No intrahepatic biliary ductal dilatation. The portal vein is patent and demonstrates biphasic flow which can be seen in patients with tricuspid regurgitation. BILIARY SYSTEM:  Gallbladder is unremarkable without evidence of pericholecystic fluid, wall thickening or stones.  Negative sonographic Graf's sign. Common bile duct is within normal limits measuring 4 mm. RIGHT KIDNEY: Diffuse increased renal parenchymal echogenicity suggestive chronic medical renal.  No right hydronephrosis PANCREAS:  Visualized portions of the pancreas are unremarkable. OTHER: No evidence of right upper quadrant ascites.      Diffuse increased heterogeneous liver parenchymal echogenicity could be seen in patients with hepatic steatosis or diffuse hepatocellular process. Patent portal vein with biphasic flow can be seen in patient with tricuspid regurgitation. RECOMMENDATIONS: Unavailable        ASSESSMENT/plan  1. Iron deficiency anemia  - s/p egd and colonoscopy yesterday egd showed no acute findings, colonoscopy showed diverticulosis, bx pending  -iron replacement  -pp  -diet as tolerated       2. Elevated lft's  Us showing possible steatosis vs hepatocellular process  -alk isoenzyme shows elevation in bone     3. JOY     4.decompensated heart failure     5.acute respiratory failure-improved extubated 3/9     D/w md gi signing off fu outpt for bx results      This plan was formulated in collaboration with Dr. Raiza Winters .     Electronically signed by: MAL Brewer CNP on 3/16/2022 at 9:40 AM     Attending Physician Statement  I have discussed the care of [address] [place] [address] Drive and   I have examined the patient myselft independently, and taken ros and hpi , including pertinent history and exam findings,  with the author of this note . I have reviewed the key elements of all parts of the encounter with the nurse practitioner/resident.     I agree with the assessment, plan and orders as documented by the above health care provider       Outpatient follow-up   PPI   H&H and iron monitoring and replacement           electronically signed by Juvencio Doan MD

## 2022-03-16 NOTE — PROGRESS NOTES
[facility]   Progress Note                   Date:   3/16/2022  Patient name: Eris Rey  Date of admission:  3/1/2022  9:47 PM  MRN:   [id number]  YOB: 1950  PCP: Mark Delgado MD    Reason for Admission:  symptomatic anemia/GIB/Hypoxic reps failure     Subjective:       Denies any cp. Denies any sob. In NSR  S/p EGD and colon  Remains on AC. Medications:   Scheduled Meds:   apixaban  5 mg Oral BID    amiodarone  200 mg Oral Daily    metoprolol tartrate  50 mg Oral BID    bumetanide  2 mg IntraVENous TID    ipratropium-albuterol  1 ampule Inhalation Q4H    sodium chloride flush  5-40 mL IntraVENous 2 times per day    allopurinol  300 mg Oral Daily    atorvastatin  40 mg Oral Daily    magnesium oxide  400 mg Oral BID    miconazole   Topical BID    therapeutic multivitamin-minerals  1 tablet Oral Daily    pantoprazole  40 mg IntraVENous Daily    And    sodium chloride flush  10 mL IntraVENous Daily       Continuous Infusions:   sodium chloride 25 mL (03/12/22 1048)    sodium chloride         CBC:   Recent Labs     03/14/22  0648 03/15/22  0540 03/16/22  0707   WBC 5.2 5.2 5.4   HGB 9.0* 8.9* 8.8*    223 208     BMP:    Recent Labs     03/14/22  0648 03/15/22  0540 03/16/22  0707    142 142   K 3.8 3.4* 3.9   CL 96* 95* 95*   CO2 37* 35* 37*   BUN 60* 56* 59*   CREATININE 1.35* 1.53* 1.83*   GLUCOSE 93 85 82     Hepatic:   No results for input(s): AST, ALT, ALB, BILITOT, ALKPHOS in the last 72 hours. Troponin: No results for input(s): TROPONINI in the last 72 hours. BNP: No results for input(s): BNP in the last 72 hours. Lipids: No results for input(s): CHOL, HDL in the last 72 hours. Invalid input(s): LDLCALCU  INR: No results for input(s): INR in the last 72 hours.     Objective:   Vitals: BP (!) 137/52   Pulse 70   Temp 98.4 °F (36.9 °C)   Resp 16   Ht 4' 11\" (1.499 m)   Wt 132 lb (59.9 kg)   SpO2 93%   BMI 26.66 kg/m² Constitutional and General Appearance: intubated sedated  HEENT: sedated  Respiratory:  · Intubated on vent  · Clear but diminished throughout. No rales  · No distress  Cardiovascular:  · Heart tones are crisp and normal. regular S1 and S2.  · Tele SR  · Peripheral pulses are symmetrical and full   Abdomen:   · Soft  · No masses or tenderness  Extremities:  ·  No Cyanosis or Clubbing  ·  Lower extremity edema: No  ·  Skin: Warm and dry  Neurological:  · Sedated    EKG 3/1/2022: NSR, LVH, Inferior infarct, NS ST T changes. Previous cardiac testing:   CATH 7/21/06:   Patent LIMA to LAD and SVG to RCA.  Occluded SVG to OM 2.  Occluded RCA.      CABG with LIMA-LAD, SVG-OM and SVG-RCA in 2003.     TTE/CV July 2020  Summary:   1. A KARLA was performed without complications. 2. Normal LV size with normal LVEF. 3. No thrombus or valvular vegetation identified  4. Moderate atheromatous disease noted in aortic arch     CARDIOVERSION:  After an adequate level of sedation was achieved, 200J in biphasic synchronized delivery was administered. conversion to normal sinus rhythm. The patient awoke without complications     Echo 5/9/45  Summary  Normal left ventricle size and function with an estimated EF > 55%. No segmental wall motion abnormalities seen. Moderate left ventricular hypertrophy. Normal right ventricular size and function. Left atrial dilatation. Aortic valve is trileaflet. Mild aortic stenosis. Mild-moderate aortic insufficiency. Normal aortic root dimension. Mitral annular calcification is seen. Mild mitral regurgitation. Mild tricuspid regurgitation. Normal right ventricular systolic pressure. No significant pericardial effusion is seen. Pleural effusion present. Echo 3/14/21:  Limited study to assess CHF. Left ventricle is normal in size. Global left ventricular systolic function is normal. Estimated LV EF 60-65  %. No obvious wall motion abnormality seen.   Left atrium is mildly dilated. Assessment & Plan:     1. GI bleed-s/p EGD and Colon  - okay fo rACD    2. Acute on chronic HFpEF-   - diurese per nephro  - family wanting echo- repeat limited Echo- EF 60-65%    3. Parox AF- in NSR  - GI has provided clearance for AC- on eliquis    4. MV CAD with CABG in 2003 (patent LIMA-LAD and SVG-RCA in 2006)- stable. Continue BB & statin. 5. Effusions- s/p thora on R  - pulm following    Will sign off, follow up in 2 weeks. Discussed with patient. Thank you for allowing me to participate in the care of this patient, please do not hesitate to call if you have any questions. Gualberto Robles DO, [facility], [address], [address], [place] [address] [facility]  [email or web address]  [phone]

## 2022-03-16 NOTE — PROGRESS NOTES
RN spoke to patient's daughter via telephone and updated her that patient will discharge today after home o2 eval and chest xray is completed. RN answered all questions. Patient's daughter stated she will be up around 3:30.

## 2022-03-16 NOTE — PROGRESS NOTES
Gabriel Yusuf MD/Rahul Soler MD/ Amrik Buchanan MD/Dr Sendy RESENDEZ AGARYDERP-BC, NP-C      Oliver RESENDEZ NP-C     Neetu Kilpatrick APRAARON NP-C                                           Pulmonary Progress Note    Patient - Eris Rey   Age - 66 y.o.   - 1950  MRN - [id number]  Acct # - [de-identified]  Date of Admission - 3/1/2022  9:47 PM    Consulting Service/Physician:       Primary Care Physician: Mark Delgado MD    SUBJECTIVE:     Chief Complaint:   Chief Complaint   Patient presents with    Fatigue     Subjective:    Jamel Hernandez is currently sitting up in chair room air. Pulse ox is 99%. Home O2 eval was ordered and is pending. She had an EGD and colonoscopy yesterday with no acute findings per report. She did have a nocturnal oxygen study done and does not qualify for nocturnal oxygen. She continues to be afebrile. She continues on Bumex 3 times a day, creatinine is up slightly to 1.83 today. VITALS  BP (!) 137/52   Pulse 70   Temp 98.4 °F (36.9 °C)   Resp 16   Ht 4' 11\" (1.499 m)   Wt 132 lb (59.9 kg)   SpO2 93%   BMI 26.66 kg/m²   Wt Readings from Last 3 Encounters:   03/15/22 132 lb (59.9 kg)   22 156 lb 1.4 oz (70.8 kg)   22 143 lb 6.4 oz (65 kg)     I/O (24 Hours)    Intake/Output Summary (Last 24 hours) at 3/16/2022 1008  Last data filed at 3/15/2022 1835  Gross per 24 hour   Intake 340 ml   Output --   Net 340 ml     Ventilator:   Settings  FiO2 : 21 %  Insp Rise Time (%): 1 %  Exam:   Physical Exam   Constitutional: Elderly female sitting up in bed on room air in no distress  HENT: Unremarkable  Head: Normocephalic and atraumatic. Eyes: EOM are normal. Pupils are equal, round, and reactive to light. Neck: Neck supple. Cardiovascular:  Regular rate and rhythm. Normal heart tones. No JVD.     Pulmonary/Chest: Diminished in bases, respirations even and unlabored, on room air with pulse ox 99%  Abdominal: Soft. Rounded, bowel sounds are normal.   Musculoskeletal: Normal range of motion. Neurological: Patient is alert and oriented to person, place, and time. Skin: Skin is warm and dry. No rash noted.    Extremities: Trace pedal edema with venous stasis changes   Infusions:      sodium chloride 25 mL (03/12/22 1048)    sodium chloride       Meds:     Current Facility-Administered Medications:     apixaban (ELIQUIS) tablet 5 mg, 5 mg, Oral, BID, Jann Ad, DO, 5 mg at 03/16/22 0818    benzonatate (TESSALON) capsule 200 mg, 200 mg, Oral, TID PRN, Manjeet Wall MD, 200 mg at 03/15/22 2020    dextromethorphan (DELSYM) 30 MG/5ML extended release liquid 60 mg, 60 mg, Oral, Q12H PRN, Manjeet Wall MD, 60 mg at 03/15/22 1827    amiodarone (CORDARONE) tablet 200 mg, 200 mg, Oral, Daily, Manjeet Wall MD, 200 mg at 03/16/22 0818    metoprolol tartrate (LOPRESSOR) tablet 50 mg, 50 mg, Oral, BID, Manjeet Wall MD, 50 mg at 03/16/22 0818    bumetanide (BUMEX) injection 2 mg, 2 mg, IntraVENous, TID, Manjeet Wall MD, 2 mg at 03/16/22 0818    ipratropium-albuterol (DUONEB) nebulizer solution 1 ampule, 1 ampule, Inhalation, Q4H, Manjeet Wall MD, 1 ampule at 03/16/22 0700    sodium chloride flush 0.9 % injection 5-40 mL, 5-40 mL, IntraVENous, 2 times per day, Manjeet Wall MD, 10 mL at 03/16/22 0830    sodium chloride flush 0.9 % injection 10 mL, 10 mL, IntraVENous, PRN, Manjeet Wall MD    0.9 % sodium chloride infusion, 25 mL, IntraVENous, PRN, Manjeet Wall MD, Last Rate: 100 mL/hr at 03/12/22 1048, 25 mL at 03/12/22 1048    ondansetron (ZOFRAN-ODT) disintegrating tablet 4 mg, 4 mg, Oral, Q8H PRN **OR** ondansetron (ZOFRAN) injection 4 mg, 4 mg, IntraVENous, Q6H PRN, Manjeet Wall MD    magnesium hydroxide (MILK OF MAGNESIA) 400 MG/5ML suspension 30 mL, 30 mL, Oral, Daily PRN, Manjeet Wall MD    acetaminophen (TYLENOL) tablet 650 mg, 650 mg, Oral, Q6H PRN, 650 mg at 03/16/22 0835 **OR** acetaminophen (TYLENOL) suppository 650 mg, 650 mg, Rectal, Q6H PRN, Manjeet Wall MD    allopurinol (ZYLOPRIM) tablet 300 mg, 300 mg, Oral, Daily, Manjeet Wall MD, 300 mg at 03/16/22 0818    atorvastatin (LIPITOR) tablet 40 mg, 40 mg, Oral, Daily, Manjeet Wall MD, 40 mg at 03/16/22 0818    magnesium oxide (MAG-OX) tablet 400 mg, 400 mg, Oral, BID, Manjeet Wall MD, 400 mg at 03/16/22 0818    miconazole (MICOTIN) 2 % powder, , Topical, BID, Manjeet Wall MD, Given at 03/16/22 0827    therapeutic multivitamin-minerals 1 tablet, 1 tablet, Oral, Daily, Manjeet Wall MD, 1 tablet at 03/16/22 0818    pantoprazole (PROTONIX) injection 40 mg, 40 mg, IntraVENous, Daily, 40 mg at 03/16/22 0818 **AND** sodium chloride flush 0.9 % injection 10 mL, 10 mL, IntraVENous, Daily, Manjeet Wall MD, 10 mL at 03/16/22 0819    0.9 % sodium chloride infusion, , IntraVENous, PRN, Manjeet Wall MD    Lab Results:     Lab Results   Component Value Date    WBC 5.4 03/16/2022    HGB 8.8 (L) 03/16/2022    HCT 28.0 (L) 03/16/2022    MCV 92.9 03/16/2022     03/16/2022     Lab Results   Component Value Date    CALCIUM 8.3 (L) 03/16/2022     03/16/2022    K 3.9 03/16/2022    CO2 37 (H) 03/16/2022    CL 95 (L) 03/16/2022    BUN 59 (H) 03/16/2022    CREATININE 1.83 (H) 03/16/2022       Lab Results   Component Value Date    INR 1.3 02/06/2022    PROTIME 16.0 (H) 02/06/2022       Radiology:       ASSESSMENT:       Acute respiratory failure, hypoxic and hypercapnic, intubated 3/7; extubated 3/9, currently on room air  Acute on chronic diastolic heart failure with bilateral effusions; status post right thoracentesis 3/10-transudative  COPD, clinical diagnosis no PFTs, no pulmonologist  History of tobacco use 30+ pack year  Nonmorbid obesity  AZIZA clinical diagnosis  Chronic kidney disease  Anemia  Full CODE STATUS     PLAN:   1. Home O2 eval today at rest and with activity  2. Did not qualify for nocturnal oxygen  3.  Defer diuretic dosing

## 2022-03-16 NOTE — CARE COORDINATION
DISCHARGE PLANNING NOTE:    CM met with patient and Dtr at bedside. Pt is refusing any VNS at this time. Dtr states \"my mom is still able to make her own decisions and I will do what ever she wants\". CM expressed concern for patient going home with no VNS, pt again refused. Dtr also states she and her brother will take patient home in her car. They refused any transportation assistance from . Pt does qualify for home O2 , orders faxed to [facility]. Shen Wall notified. Dtr instructed on use of portable tank and they she needs to call when they get home to have concentrator delivered. Dtr stated understanding. HOME OXYGEN:  Portable 02 tank delivered per [facility]. 02 tank turned on and noted to be full. Patient understands to call [facility] immediately upon arrival home to have 02 concentrator delivered. Electronically signed by Cleotis Brittle, RN on 3/16/2022 at [address] from [facility] stated patient has [employer]ando Caleb and [facility] does not accept that insurance. RN Attempted to reach [facility] from [place] O2, unable to talk to [facility] , left VM. RN called [facility] and left a   for O2.   174 no response. Pt will stay the night and need O2 to be followed up on in the morning.    Electronically signed by Cleotis Brittle, RN on 3/16/2022 at 5:43 PM

## 2022-03-16 NOTE — PROGRESS NOTES
Dr. Corby Dao requested RN to speak to Dr. Erika Gardner to see is patient is to restart Plavix on discharge. RN spoke to Dr. Erika Gardner via telephone and patient is to discharge on Eliquis only. Telephone with read back.

## 2022-03-16 NOTE — FLOWSHEET NOTE
03/16/22 1441   Encounter Summary   Services provided to: Patient not available  (patient busy talking with family)

## 2022-03-16 NOTE — PROGRESS NOTES
Department of Internal Medicine  Nephrology Aiden Monterroso MD  Progress note    Reason for consultation: Management of acute kidney injury superimposed on chronic kidney disease stage III. Consulting physician: Christel Ta MD.    Interval history:   Patient was seen and examined. Shortness of breath is slowly improving. Patient had EGD colonoscopy yesterday scheduled for capsular endoscopy in the outpatient. Colonoscopy Showed diverticulosis. EGD showed no acute bleeding ulcer. Renal function continues to worsen on high-dose diuretics. Creatinine of 1.8 mg/dl today.    -patient developed acute hypoxic respiratory failure in the ICU and is  status post extubation on 3/9/2022. She underwent right thoracentesis with removal of 800 mL of pleural fluid on 3/10/22  Laboratory studies were reviewed. Renal function is stable, serum creatinine 1.3 mg/dL    History of present illness: This is a 66 y.o. female with a significant past medical history of Heart Failure with reduced ejection fraction [HFrEF with LVEF 45 to 50% in July 2020 secondary to ischemic cardiomyopathy], Coronary artery disease, Meningioma, type 2 diabetes mellitus [with nonproliferative diabetic retinopathy], essential hypertension, bilateral deafness and chronic kidney disease stage III [baseline serum creatinine 1.6 mg/dL and follows up with Dr. Rissa Narvaez of renal services of [facility] whom she saw 1 week ago and was switched from Lasix to Bumex], presented with complaints of fatigue and dyspnea with exertion progressively worsened over 2 weeks. She had been recently admitted and seen by us at [facility] on 2/11/2022. Laboratory studies at presentation however were remarkable for hemoglobin 7.4 g/dL and BUN/creatinine 110/2.11 mg/dL. She denies hemoptysis or melena stools. However, hemoglobin dropped overnight to 4.4 g/dL and she is scheduled for endoscopy tomorrow.     Scheduled Meds:   apixaban  2.5 mg Oral BID    amiodarone  200 mg Oral Daily    metoprolol tartrate  50 mg Oral BID    [Held by provider] bumetanide  2 mg IntraVENous TID    ipratropium-albuterol  1 ampule Inhalation Q4H    sodium chloride flush  5-40 mL IntraVENous 2 times per day    allopurinol  300 mg Oral Daily    atorvastatin  40 mg Oral Daily    magnesium oxide  400 mg Oral BID    miconazole   Topical BID    therapeutic multivitamin-minerals  1 tablet Oral Daily    pantoprazole  40 mg IntraVENous Daily    And    sodium chloride flush  10 mL IntraVENous Daily     Continuous Infusions:   sodium chloride 25 mL (03/12/22 1048)    sodium chloride       PRN Meds:.benzonatate, dextromethorphan, sodium chloride flush, sodium chloride, ondansetron **OR** ondansetron, magnesium hydroxide, acetaminophen **OR** acetaminophen, sodium chloride    Physical Exam:    VITALS:  BP (!) 137/52   Pulse 70   Temp 98.4 °F (36.9 °C)   Resp 16   Ht 4' 11\" (1.499 m)   Wt 132 lb (59.9 kg)   SpO2 92%   BMI 26.66 kg/m²     Constitutional: Patient is sedated, intubated on ventilator    Skin: Skin color, texture, turgor normal. No rashes or lesions    Head: Normocephalic, without obvious abnormality, atraumatic     Cardiovascular/Edema: S1, S2, regular rate and rhythm with no pericardial rub. Respiratory: Diminished breath sounds bilaterally [right worse than left. Abdomen: Full, soft with normal bowel sounds. Back: symmetric, no curvature. ROM normal. No CVA tenderness. Extremities: Bilateral pedal edema.   Neuro:  Grossly normal      CBC:   Recent Labs     03/14/22  0648 03/15/22  0540 03/16/22  0707   WBC 5.2 5.2 5.4   HGB 9.0* 8.9* 8.8*    223 208     BMP:    Recent Labs     03/14/22  0648 03/15/22  0540 03/16/22  0707    142 142   K 3.8 3.4* 3.9   CL 96* 95* 95*   CO2 37* 35* 37*   BUN 60* 56* 59*   CREATININE 1.35* 1.53* 1.83*   GLUCOSE 93 85 82       Lab Results   Component Value Date    NITRU NEGATIVE 02/22/2022    COLORU Yellow 02/22/2022    PHUR 5.0 02/22/2022    WBCUA 10 TO 20 02/22/2022    RBCUA TOO NUMEROUS TO COUNT 02/22/2022    MUCUS NOT REPORTED 02/07/2022    TRICHOMONAS NOT REPORTED 02/07/2022    YEAST NOT REPORTED 02/07/2022    BACTERIA None 02/22/2022    SPECGRAV 1.016 02/22/2022    LEUKOCYTESUR NEGATIVE 02/22/2022    UROBILINOGEN Normal 02/22/2022    BILIRUBINUR NEGATIVE 02/22/2022    GLUCOSEU TRACE 02/22/2022    KETUA NEGATIVE 02/22/2022    AMORPHOUS NOT REPORTED 02/07/2022     Urine Protein:     Lab Results   Component Value Date     10/27/2020     Urine Creatinine:     Lab Results   Component Value Date    LABCREA 26.2 05/26/2021     Imaging studies. Chest x-ray increasing congestive changes small-to-moderate bilateral pleural effusion      IMPRESSION/RECOMMENDATIONS:      1. Acute kidney injury superimposed on chronic kidney disease stage III - most consistent with prerenal azotemia led to dehydration and anemia. Renal function is worse  with creatinine of 1.83 mg/dL today. Monitor urine output closely. Avoid nephrotoxic agents. Basic metabolic profile daily. Hold IV bumex. 2.  Systemic hypertension - Blood pressure is adequately controlled. 3.  Acute on chronic anemia - rule out GI bleed. Hemoglobin today is 8.9 g/dL. 4.  Decompensated heart failure with preserved ejection fraction [HFpEF] -  Monitor strict I's and O's, 1500 mils fluid restriction and 2 g sodium diet dailyRepeat limited echo shows EF of 60 to 65%  No current indication for ultrafiltration. 5.  Right pleural effusion s/p thoracentesis [3/10/2022]. No objections to discharge from renal standpoint. Resume oral Bumex 1 mg twice daily. Monitor basic metabolic panel in 1 week and CBC and follow CKD clinic in 3 weeks. Eliquis dose has been adjusted to 2.5 mg twice daily for current GFR    Prognosis is guarded.         Tomás Siddiqi MD   Attending Nephrologist  3/16/2022 2:15 PM

## 2022-03-16 NOTE — PROGRESS NOTES
Home Oxygen Evaluation    Room air SpO2 at Rest = 92%    Room air with exercise/exertion = 89% per PT. SpO2 on prescribed O2 level at   1LPM  at rest =  98%     Nocturnal Oximetry with patient on room air recommended if the resting SpO2 is 89% to 95%.  (Requires additional order)

## 2022-03-16 NOTE — CARE COORDINATION
ONGOING DISCHARGE PLAN:    Patient is alert and oriented x4. Spoke with patient regarding discharge plan and patient confirms that plan is still home with family andVNS elara [facility]. Awaiting home O2 eval  And CXR today. Will continue to follow for additional discharge needs.     Electronically signed by Marta Bolden RN on 3/16/2022 at 2:24 PM

## 2022-03-16 NOTE — ANESTHESIA POSTPROCEDURE EVALUATION
Department of Anesthesiology  Postprocedure Note    Patient: Rakan Kwon  MRN: [id number]  YOB: 1950  Date of evaluation: 3/16/2022  Time:  10:45 AM     Procedure Summary     Date: 03/15/22 Room / Location: [facility] ENDO 01 / [facility]    Anesthesia Start: 1321 Anesthesia Stop: 8093    Procedures:       EGD BIOPSY (N/A Esophagus)      COLONOSCOPY DIAGNOSTIC (N/A Anus) Diagnosis:       (IRON DEFICIENCY ANEMIA)      (COVID NEG 3/1)    Surgeons: Carroll Snow MD Responsible Provider: Chava Chaparro MD    Anesthesia Type: general ASA Status: 3          Anesthesia Type: general    Royer Phase I: Royer Score: 8    Royer Phase II:      Last vitals: Reviewed and per EMR flowsheets. Anesthesia Post Evaluation    Comments: POD #1 Patient seen sitting up in chair. Denies any anesthesia related issues.

## 2022-03-16 NOTE — PROGRESS NOTES
[address]   Physical Therapy Progress Note    Date: 3/16/22  Patient Name: Reynaldo Damon       Room: 6728/1213-85  MRN: [id number]   Account: [de-identified]   : 1950  (72 y.o.)   Gender: female     Discharge Recommendations   Patient would benefit from continued therapy after discharge  Equipment Needed: Yes  Other: [facility]    Referring Practitioner: Dr Sruthi Lee  Diagnosis: Chronic stage 3 Kidney Disease  Restrictions/Precautions: Fall Risk   Past Medical History:  has a past medical history of Acute CVA (cerebrovascular accident) ([facility] [address] Altered mental status, Arthritis, Brain mass, Cellulitis and abscess, Cellulitis and abscess of unspecified site, Cellulitis of both lower extremities, Cellulitis of left lower extremity, Cellulitis of lower extremity, CHF (congestive heart failure) ([address] Chronic kidney disease, unspecified, Coronary atherosclerosis of native coronary artery, Essential hypertension, Essential hypertension, malignant, Hallucinations, Hard of hearing, Head contusion, Hypokalemia, Iron deficiency anemia, unspecified, Meningioma ([address] Myocardial infarction ([address] Other and unspecified hyperlipidemia, Pure hypercholesterolemia, Toxic metabolic encephalopathy, Type II or unspecified type diabetes mellitus without mention of complication, not stated as uncontrolled, Unspecified asthma(493.90), Unspecified venous (peripheral) insufficiency, Unspecified vitamin D deficiency, and UTI (urinary tract infection). Past Surgical History:   has a past surgical history that includes Tonsillectomy and adenoidectomy; Coronary artery bypass graft; intubation (3/7/2022); Thoracentesis (3/10/2022); Upper gastrointestinal endoscopy (N/A, 3/15/2022); and Colonoscopy (N/A, 3/15/2022).        Overall Orientation Status: Within Functional Limits  Restrictions/Precautions  Restrictions/Precautions: Fall Risk    Subjective: Pt was sitting in bedside chair upon arrival. Pt reports hoping to go home soon but is concerned with being down so much here in the hospital.   Comments: RN Merrell Seip okay with PT tx. Pt is [place]. Vital Signs  Patient Currently in Pain: Yes  Pain Assessment: 0-10  Pain Level: 3  Pain Location: Back  Non-Pharmaceutical Pain Intervention(s): Ambulation/Increased Activity; Distraction;Repositioned  Response to Pain Intervention: Patient Satisfied     Oxygen Therapy  SpO2: (!) 89 % (walking with PT)  Pulse Oximeter Device Mode: Intermittent  Pulse Oximeter Device Location: Finger  O2 Device: None (Room air)          Bed Mobility:   Rolling: Unable to assess  Supine to Sit: Unable to assess  Sit to Supine: Unable to assess  Scooting: Unable to assess  Comment: Unable to assess      Transfers:  Sit to Stand: Moderate Assistance  Stand to sit: Contact guard assistance                 Ambulation 1  Surface: level tile  Device: Rolling Walker  Other Apparatus: O2 (1)  Assistance: Contact guard assistance  Quality of Gait: kyphotic posture, head and gaze downward, wide LALA at times, very slow pace with frequent stop  Gait Deviations: Slow Ivania;Decreased step length;Decreased step height  Distance: 16ft  Comments: SaO2 89% while amb on room air, cues to look up and scan environment        Stairs/Curb  Stairs?: Yes (attempt 8\" step per daughter, pt unable to perform tall step)  Stairs  Comment: daughter reported pt has \"steep steps\" at home, attempt 8\" step box, pt unable to complete                                                  Posture: Poor (Kyphotic posture. )  Sitting - Static: Fair;+ (SBA)  Sitting - Dynamic: Fair (CGA)  Standing - Static: Fair (CGA)  Standing - Dynamic: Fair;- (Mehdi)  Comments: standing balance assessed with RW. Other exercises?: Yes  Other exercises 1: Monitor O2 throughout  Other exercises 2: STS x3 from chair.    Other exercises 3: Home safety, safe use of Rolling Walker, stair safety, home setup (answered family questions and concerns)  Other Activities  Comment: Fatigues quickly        Activity Tolerance: Patient limited by endurance  PT Equipment Recommendations  Equipment Needed: Yes  Other: BSC       Assessment  Activity Tolerance: Patient limited by endurance   Body structures, Functions, Activity limitations: Decreased functional mobility ; Decreased strength;Decreased endurance;Decreased balance  Discharge Recommendations: Patient would benefit from continued therapy after discharge     Type of devices: Call light within reach; Left in chair;Gait belt;Nurse notified (family present)     Plan  Times per week: 6-7x/wk  Current Treatment Recommendations: Hina Flmeing Mobility Training,Transfer Training    Patient Education  New Education Provided:  Plan of Care  Learner:patient  Method: demonstration and explanation       Outcome: needs reinforcement     Goals  Short term goals  Time Frame for Short term goals: 5-7 days  Short term goal 1: bed mobility with Mehdi  Short term goal 2: standing with RW and CGA x 2min for ease of ADLs  Short term goal 3: transfers to/from bed/chair with RW and Mehdi  Short term goal 4: negotiate 3 steps with rail/CGA    PT Individual Minutes  Time In: 9318  Time Out: [address]  Minutes: 38    Electronically signed by Alison Rodrigues PTA on 3/16/22 at 4:10 PM EDT

## 2022-03-16 NOTE — PROGRESS NOTES
Spoke with pulmonary NP regarding chest xray results. Agatha Verdugo for patient to discharge and follow up in office with chest xray prior to visit.

## 2022-03-16 NOTE — PLAN OF CARE
Problem: Skin Integrity:  Goal: Will show no infection signs and symptoms  Description: Will show no infection signs and symptoms  3/16/2022 0302 by Kitty Reardon RN  Outcome: Ongoing     Problem: Skin Integrity:  Goal: Absence of new skin breakdown  Description: Absence of new skin breakdown  3/16/2022 0302 by Kitty Reardon RN  Outcome: Ongoing     Problem: Falls - Risk of:  Goal: Will remain free from falls  Description: Will remain free from falls  3/16/2022 0302 by Kitty Reardon RN  Outcome: Ongoing     Problem: Falls - Risk of:  Goal: Absence of physical injury  Description: Absence of physical injury  3/16/2022 0302 by Kitty Reardon RN  Outcome: Ongoing     Problem: Nutrition  Goal: Optimal nutrition therapy  3/16/2022 0302 by Kitty Reardon RN  Outcome: Ongoing     Problem: Pain:  Goal: Pain level will decrease  Description: Pain level will decrease  3/16/2022 0302 by Kitty Reardon RN  Outcome: Ongoing     Problem: Pain:  Goal: Control of acute pain  Description: Control of acute pain  3/16/2022 0302 by Kitty Reardon RN  Outcome: Ongoing     Problem: Pain:  Goal: Control of chronic pain  Description: Control of chronic pain  3/16/2022 0302 by Kitty Reardon RN  Outcome: Ongoing     Problem: OXYGENATION/RESPIRATORY FUNCTION  Goal: Patient will maintain patent airway  3/16/2022 0302 by Kitty Reardon RN  Outcome: Ongoing     Problem: OXYGENATION/RESPIRATORY FUNCTION  Goal: Patient will achieve/maintain normal respiratory rate/effort  Description: Respiratory rate and effort will be within normal limits for the patient  3/16/2022 0302 by Kitty Reardon RN  Outcome: Ongoing

## 2022-03-17 VITALS
TEMPERATURE: 98.1 F | SYSTOLIC BLOOD PRESSURE: 137 MMHG | WEIGHT: 132 LBS | RESPIRATION RATE: 18 BRPM | DIASTOLIC BLOOD PRESSURE: 63 MMHG | HEART RATE: 73 BPM | OXYGEN SATURATION: 96 % | HEIGHT: 59 IN | BODY MASS INDEX: 26.61 KG/M2

## 2022-03-17 LAB
ANION GAP SERPL CALCULATED.3IONS-SCNC: 8 MMOL/L (ref 9–17)
BUN BLDV-MCNC: 66 MG/DL (ref 8–23)
CALCIUM SERPL-MCNC: 8.5 MG/DL (ref 8.6–10.4)
CHLORIDE BLD-SCNC: 98 MMOL/L (ref 98–107)
CO2: 37 MMOL/L (ref 20–31)
CREAT SERPL-MCNC: 1.89 MG/DL (ref 0.5–0.9)
GFR AFRICAN AMERICAN: 32 ML/MIN
GFR NON-AFRICAN AMERICAN: 26 ML/MIN
GFR SERPL CREATININE-BSD FRML MDRD: ABNORMAL ML/MIN/{1.73_M2}
GLUCOSE BLD-MCNC: 150 MG/DL (ref 65–105)
GLUCOSE BLD-MCNC: 85 MG/DL (ref 70–99)
HCT VFR BLD CALC: 29.2 % (ref 36–46)
HEMOGLOBIN: 9.2 G/DL (ref 12–16)
MAGNESIUM: 2.1 MG/DL (ref 1.6–2.6)
MCH RBC QN AUTO: 29.1 PG (ref 26–34)
MCHC RBC AUTO-ENTMCNC: 31.4 G/DL (ref 31–37)
MCV RBC AUTO: 92.7 FL (ref 80–100)
PDW BLD-RTO: 17.3 % (ref 11.5–14.9)
PLATELET # BLD: 228 K/UL (ref 150–450)
PMV BLD AUTO: 8.1 FL (ref 6–12)
POTASSIUM SERPL-SCNC: 4.1 MMOL/L (ref 3.7–5.3)
RBC # BLD: 3.14 M/UL (ref 4–5.2)
SODIUM BLD-SCNC: 143 MMOL/L (ref 135–144)
SURGICAL PATHOLOGY REPORT: NORMAL
WBC # BLD: 4.7 K/UL (ref 3.5–11)

## 2022-03-17 PROCEDURE — 80048 BASIC METABOLIC PNL TOTAL CA: CPT

## 2022-03-17 PROCEDURE — 6370000000 HC RX 637 (ALT 250 FOR IP): Performed by: INTERNAL MEDICINE

## 2022-03-17 PROCEDURE — 36415 COLL VENOUS BLD VENIPUNCTURE: CPT

## 2022-03-17 PROCEDURE — 85027 COMPLETE CBC AUTOMATED: CPT

## 2022-03-17 PROCEDURE — 94640 AIRWAY INHALATION TREATMENT: CPT

## 2022-03-17 PROCEDURE — 2700000000 HC OXYGEN THERAPY PER DAY

## 2022-03-17 PROCEDURE — 97116 GAIT TRAINING THERAPY: CPT

## 2022-03-17 PROCEDURE — 99239 HOSP IP/OBS DSCHRG MGMT >30: CPT | Performed by: INTERNAL MEDICINE

## 2022-03-17 PROCEDURE — 83735 ASSAY OF MAGNESIUM: CPT

## 2022-03-17 PROCEDURE — 97110 THERAPEUTIC EXERCISES: CPT

## 2022-03-17 PROCEDURE — 82947 ASSAY GLUCOSE BLOOD QUANT: CPT

## 2022-03-17 PROCEDURE — 94761 N-INVAS EAR/PLS OXIMETRY MLT: CPT

## 2022-03-17 RX ADMIN — MAGNESIUM OXIDE TAB 400 MG (241.3 MG ELEMENTAL MG) 400 MG: 400 (241.3 MG) TAB at 08:45

## 2022-03-17 RX ADMIN — AMIODARONE HYDROCHLORIDE 200 MG: 200 TABLET ORAL at 08:46

## 2022-03-17 RX ADMIN — METOPROLOL TARTRATE 50 MG: 50 TABLET, FILM COATED ORAL at 08:45

## 2022-03-17 RX ADMIN — IPRATROPIUM BROMIDE AND ALBUTEROL SULFATE 1 AMPULE: 2.5; .5 SOLUTION RESPIRATORY (INHALATION) at 10:55

## 2022-03-17 RX ADMIN — IPRATROPIUM BROMIDE AND ALBUTEROL SULFATE 1 AMPULE: 2.5; .5 SOLUTION RESPIRATORY (INHALATION) at 08:00

## 2022-03-17 RX ADMIN — ACETAMINOPHEN 650 MG: 325 TABLET, FILM COATED ORAL at 08:46

## 2022-03-17 RX ADMIN — IPRATROPIUM BROMIDE AND ALBUTEROL SULFATE 1 AMPULE: 2.5; .5 SOLUTION RESPIRATORY (INHALATION) at 14:46

## 2022-03-17 RX ADMIN — MULTIPLE VITAMINS W/ MINERALS TAB 1 TABLET: TAB at 08:45

## 2022-03-17 RX ADMIN — ALLOPURINOL 300 MG: 300 TABLET ORAL at 08:45

## 2022-03-17 RX ADMIN — ATORVASTATIN CALCIUM 40 MG: 40 TABLET, FILM COATED ORAL at 08:45

## 2022-03-17 RX ADMIN — APIXABAN 2.5 MG: 2.5 TABLET, FILM COATED ORAL at 08:46

## 2022-03-17 RX ADMIN — Medication 60 MG: at 10:31

## 2022-03-17 ASSESSMENT — PAIN SCALES - GENERAL
PAINLEVEL_OUTOF10: 3
PAINLEVEL_OUTOF10: 0
PAINLEVEL_OUTOF10: 0
PAINLEVEL_OUTOF10: 3
PAINLEVEL_OUTOF10: 3
PAINLEVEL_OUTOF10: 0

## 2022-03-17 ASSESSMENT — PAIN DESCRIPTION - LOCATION
LOCATION: HEAD
LOCATION: HEAD
LOCATION: HAND

## 2022-03-17 ASSESSMENT — PAIN DESCRIPTION - ORIENTATION
ORIENTATION: ANTERIOR;POSTERIOR
ORIENTATION: ANTERIOR;POSTERIOR

## 2022-03-17 ASSESSMENT — PAIN DESCRIPTION - PAIN TYPE
TYPE: ACUTE PAIN

## 2022-03-17 ASSESSMENT — PAIN DESCRIPTION - DESCRIPTORS
DESCRIPTORS: HEADACHE
DESCRIPTORS: HEADACHE

## 2022-03-17 ASSESSMENT — PAIN DESCRIPTION - FREQUENCY
FREQUENCY: CONTINUOUS
FREQUENCY: CONTINUOUS

## 2022-03-17 ASSESSMENT — PAIN SCALES - WONG BAKER: WONGBAKER_NUMERICALRESPONSE: 0

## 2022-03-17 NOTE — PROGRESS NOTES
Pola Benito qualified for 1 L oxygen with activity yesterday. Oxygen is to be delivered to her home today. She was informed to use the oxygen with activity. No oxygen needed at rest.  She will benefit from home oxygen and a face-to-face evaluation was completed. She was also instructed to use her incentive spirometer at least 4 times per day. She is encouraged to cough and deep breathe. She is to have a repeat chest x-ray before her follow-up with us in 1 to 2 weeks. Daughter is at bedside and verbalizes understanding of the above. Okay to discharge from pulmonary standpoint with close outpatient follow-up.

## 2022-03-17 NOTE — CARE COORDINATION
DISCHARGE PLANNING NOTE:    Portable oxygen concentrator delivered to patients room.     Electronically signed by Lele Laureano RN on 3/17/2022 at 1:56 PM

## 2022-03-17 NOTE — PROGRESS NOTES
[address]   INPATIENT OCCUPATIONAL THERAPY  PROGRESS NOTE  Date: 3/17/2022  Patient Name: Joe Gregorio      Room: 1993/9829-49  MRN: [id number]    : 1950  (72 y.o.) Gender: female     Discharge Recommendations:  Further Occupational Therapy is recommended upon facility discharge. OT Equipment Recommendations  Equipment Needed: Yes  Mobility Devices: ADL Assistive Devices    Referring Practitioner: Dr Bee Franco  Diagnosis: Chronic stage 3 Kidney Disease  General  Chart Reviewed: Yes,Orders,Progress Notes,History and Physical,Imaging  Additional Pertinent Hx: Hard of Hearing  Response to previous treatment: Patient with no complaints from previous session  Family / Caregiver Present: Yes  Referring Practitioner: Dr Bee Franco  Diagnosis: Chronic stage 3 Kidney Disease    Restrictions  Restrictions/Precautions: Fall Risk      Subjective  Subjective: pt states that the doctors removed a coffee pots worth of fluid from her R lung. yesterday. Comments: pt alert, very [place]. unproductive cough noted    Overall Orientation Status: Within Functional Limits  Patient Observation  Observations: O2 sats 93-95%on RA, Pulmonologist educated patient and daughter that pt should be on 1L of O2 during functional mobility and standing. Pain Assessment  Pain Assessment: 0-10  Pain Level: 3  Pain Type: Acute pain  Pain Location: Head  Pain Orientation: Anterior,Posterior  Pain Descriptors: Headache    Objective  Cognition  Overall Cognitive Status: WFL  Bed mobility  Comment: pt up in chair  Balance  Sitting Balance: Supervision (seated in bed side chair, daughter in room)         ADL  Feeding: Setup  Grooming: Setup; Increased time to complete  UE Bathing: Moderate assistance  LE Bathing: Moderate assistance  UE Dressing: Moderate assistance  LE Dressing: Maximum assistance  Toileting: Maximum assistance  Additional Comments: ADL scores based on skilled observation this date.  pt daughter states that the family is looking into having a small walk-in shower added to half-bath on first floor of home because pt is unable to walk up multiple steps to second floor. writer educated pt and daughter on DME/AE and EC to increase independence and safety with ADLs. ie shower chair, reacher, keeping bathroom well ventilated. Both verbalizes understanding. Type of ROM/Therapeutic Exercise  Type of ROM/Therapeutic Exercise: Resistive Bands (orange)  Comment: HEP, BUE x 15 reps to increase strength and endurance for ADL and IADL activites   Exercises  Shoulder Flexion: x  Shoulder Extension: x  Horizontal ABduction: x  Horizontal ADduction: x  Elbow Flexion: x  Elbow Extension: x             Apparatus Needs   Apparatus Needs O2  (1L during mobility, personal alert system at home for safety if alone)         Assessment  Performance deficits / Impairments: Decreased functional mobility ; Decreased ADL status; Decreased strength;Decreased safe awareness;Decreased cognition;Decreased endurance  Prognosis: Good  Discharge Recommendations: Home with assist PRN;Home with nursing aide;Home with Home health OT  Activity Tolerance: Patient Tolerated treatment well  Safety Devices in place: Yes  Type of devices: Left in chair;Nurse notified;Call light within reach (daughter in room as writer departs             Patient Education:  Patient Education: OT POC, HOME SAFETY/FALL PREVENTION, HEP, EC/WS, O2 safety ie being aware on O2 tubing during transfers and keep away from open flames, AE/DME, personal alert system, utilize spirometer 4x's daily  Learner:patient and daughter  Method: demonstration, explanation and handout       Outcome: acknowledged understanding, demonstrated understanding and asked questions     Plan  Safety Devices  Safety Devices in place: Yes  Type of devices: Left in chair,Nurse notified,Call light within reach  Plan  Times per week: 3-5 sessions  Times per day: Daily  Current Treatment Recommendations: Strengthening,Functional Mobility Training,Endurance Training,Cognitive Reorientation,Safety Education & Training,Patient/Caregiver Education & Training,Self-Care / ADL,Home Management Training      Goals  Short term goals  Time Frame for Short term goals: By Discharge  Short term goal 1: Patient / Family D/V understanding of Home Program for safety and UE conditioning/strengthening to support safety at home  Short term goal 2: Tolerate 10-25 minutes of general activity to support care tasks with application to home needs  Short term goal 3: D/V understanding of DME/AE/Modified Care techniques to support participateion in Care tasks    OT Individual Minutes  Time In: 1024  Time Out: 1052  Minutes: 28      Electronically signed by EMILY Bardales on 3/17/22 at 11:08 AM DIONT

## 2022-03-17 NOTE — PROGRESS NOTES
[address] [facility]    Physical Therapy Progress Note    Date: 3/17/22  Patient Name: Katie Rodríguez       Room: 9881/5045-95  MRN: [id number]   Account: [de-identified]   : 1950  (72 y.o.)   Gender: female     Discharge Recommendations   Patient would benefit from continued therapy after discharge  Equipment Needed: Yes  Other: [facility]    Referring Practitioner: Dr Leena Curran  Diagnosis: Chronic stage 3 Kidney Disease  Restrictions/Precautions: Fall Risk   Past Medical History:  has a past medical history of Acute CVA (cerebrovascular accident) ([facility] [address] Altered mental status, Arthritis, Brain mass, Cellulitis and abscess, Cellulitis and abscess of unspecified site, Cellulitis of both lower extremities, Cellulitis of left lower extremity, Cellulitis of lower extremity, CHF (congestive heart failure) ([address] Chronic kidney disease, unspecified, Coronary atherosclerosis of native coronary artery, Essential hypertension, Essential hypertension, malignant, Hallucinations, Hard of hearing, Head contusion, Hypokalemia, Iron deficiency anemia, unspecified, Meningioma ([address] Myocardial infarction ([address] Other and unspecified hyperlipidemia, Pure hypercholesterolemia, Toxic metabolic encephalopathy, Type II or unspecified type diabetes mellitus without mention of complication, not stated as uncontrolled, Unspecified asthma(493.90), Unspecified venous (peripheral) insufficiency, Unspecified vitamin D deficiency, and UTI (urinary tract infection). Past Surgical History:   has a past surgical history that includes Tonsillectomy and adenoidectomy; Coronary artery bypass graft; intubation (3/7/2022); Thoracentesis (3/10/2022); Upper gastrointestinal endoscopy (N/A, 3/15/2022); and Colonoscopy (N/A, 3/15/2022).        Overall Orientation Status: Within Functional Limits  Restrictions/Precautions  Restrictions/Precautions: Fall Risk    Subjective: Pt is fully dressed and laying in bed; per pt's daughter pt refused to change after discharge was held; pt reports \"I am going home today\"; pt c/o \"tension headache all over\"  Comments: RN Anthony Storey okay with PT tx. Pt is pleasant and cooperative. Pt still complaining of headache. Vital Signs  Patient Currently in Pain: Yes  Pain Assessment: 0-10  Pain Level: 3  Pain Type: Acute pain  Pain Location: Head  Pain Orientation: Anterior;Posterior  Pain Descriptors: Headache  Pain Frequency: Continuous  Non-Pharmaceutical Pain Intervention(s): Ambulation/Increased Activity; Distraction;Repositioned  Response to Pain Intervention: Patient Satisfied     Oxygen Therapy  SpO2: 95 % (ambulating in hallway)  Pulse Oximeter Device Mode: Intermittent  Pulse Oximeter Device Location: Finger  O2 Device: None (Room air)          Bed Mobility:   Rolling: Stand by assistance  Supine to Sit: Stand by assistance  Sit to Supine: Stand by assistance  Scooting: Stand by assistance  Comment: HOB elevated to 45 degrees, pt reports haven't slept in bed in 35 years. Pt reports sleeping in recliner at home. Transfers:  Sit to Stand: Stand by assistance (x4 tries to stand, pt able to stand without assist)  Stand to sit: Stand by assistance  Bed to Chair: Stand by assistance              Ambulation 1  Surface: level tile  Device: Rolling Walker  Assistance: Stand by assistance  Quality of Gait: kyphotic posture, head and gaze downward, wide LALA at times, very slow pace with frequent stop  Gait Deviations: Slow Ivania;Decreased step length;Decreased step height  Distance: 95ft  Comments: SaO2 WNL, steady, no LOB        Stairs/Curb  Stairs?: Yes (attempt 8\" step per daughter, pt unable to perform tall step)  Stairs  # Steps : 2  Stairs Height: 6\"  Device: Rolling walker  Assistance: Minimal assistance  Comment: pt still thinks she is unable to perform 8\" (\"my son will get me in the house\") but pt is willing to try the 6\" step box.  Cues for sequencing and safety Posture: Poor (Kyphotic posture. )  Sitting - Static: Good;-  Sitting - Dynamic: Good;-  Standing - Static: Good;-  Standing - Dynamic: Fair;+  Comments: standing balance assessed with RW. Other exercises?: Yes  Other exercises 1: Seated bilat LE exercise [id number]  Other exercises 2: STS x2 from chair. Other exercises 3: Home safety, safe use of Rolling Walker, stair safety, home setup (answered family questions and concerns)  Other Activities  Comment: Improved tolerance this date, pt very determined to get stronger. Activity Tolerance: Patient limited by endurance; Patient Tolerated treatment well  Activity Tolerance: Improving strength. Assessment  Activity Tolerance: Patient limited by endurance; Patient Tolerated treatment well   Body structures, Functions, Activity limitations: Decreased functional mobility ; Decreased strength;Decreased endurance;Decreased balance  Discharge Recommendations: Patient would benefit from continued therapy after discharge     Type of devices: Call light within reach; Left in chair;Gait belt;Nurse notified (family present)     Plan  Times per week: 6-7x/wk  Current Treatment Recommendations: Chante Anupam Mobility Training,Transfer Training    Patient Education  New Education Provided:  Discharge Plan of Care  Learner:patient  Method: demonstration and explanation       Outcome: needs reinforcement     Goals  Short term goals  Time Frame for Short term goals: 5-7 days  Short term goal 1: bed mobility with Mehdi  Short term goal 2: standing with RW and CGA x 2min for ease of ADLs  Short term goal 3: transfers to/from bed/chair with RW and Mehdi  Short term goal 4: negotiate 3 steps with rail/CGA    PT Individual Minutes  Time In: 9186  Time Out: [address]  Minutes: 31    Electronically signed by Maria Luz Fajardo PTA on 3/17/22 at 10:06 AM EDT

## 2022-03-17 NOTE — CARE COORDINATION
DISCHARGE PLANNING NOTE:      Writer KYLAH Joy at the [place] in regards to patients home oxygen. Awaiting call back, will continue to follow. Electronically signed by Lucy Pablo RN on 3/17/2022 at 8:23 AM     Called number on patients insurance card who notified me that this insurance is for beneficiaries not  and oxygen order can go through any company with a physicians order. Writer called [facility], in regards to this who notified writer that they are unable to run this insurance. Writer reached out to Angela Julisa with with [address] [place] [address] who believes they are able to accept this insurance. Faxed face sheet and order - awaiting call back on if they are able to accept.     Electronically signed by Lucy Pablo RN on 3/17/2022 at 8:58 AM

## 2022-03-17 NOTE — PROGRESS NOTES
Comprehensive Nutrition Assessment    Type and Reason for Visit:  Reassess    Nutrition Recommendations/Plan:   Will continue to provide Regular diet and add Ensure Clear supplements on all trays    Nutrition Assessment:  Pt now advanced to solid food. She does not like Ensure supplements but is willing to try Clear Liquid supplements. Malnutrition Assessment:  Malnutrition Status:  Mild malnutrition    Context:  Acute Illness     Findings of the 6 clinical characteristics of malnutrition:  Energy Intake:  7 - 50% or less of estimated energy requirements for 5 or more days  Weight Loss:  Unable to assess (edema present)     Body Fat Loss:  Unable to assess     Muscle Mass Loss:  Unable to assess    Fluid Accumulation:  1 - Mild Generalized   Strength:  Not Performed    Estimated Daily Nutrient Needs:  Energy (kcal):  6163-3657 kcals based on 25-27 kcals/kg using adm wt 61 kg; Weight Used for Energy Requirements:  Admission     Protein (g):  69-77 gm protein based on 1.6-1.8 gm/kg using IBW.; Weight Used for Protein Requirements:  Ideal          Nutrition Related Findings:  Labs/Meds: Reviewed, BM 3/15      Wounds:  None       Current Nutrition Therapies:    ADULT DIET;  Regular  ADULT ORAL NUTRITION SUPPLEMENT; Breakfast, Dinner, Lunch; Clear Liquid Oral Supplement    Anthropometric Measures:  · Height: 4' 11\" (149.9 cm)  · Current Body Weight: 132 lb (59.9 kg)   · Admission Body Weight: 135 lb (61.2 kg)    Ideal Body Weight: 95 lbs;     Nutrition Diagnosis:   · Mild malnutrition related to impaired respiratory function,inadequate protein-energy intake as evidenced by intake 51-75%    Nutrition Interventions:   Food and/or Nutrient Delivery:  Continue Current Diet,Start Oral Nutrition Supplement  Nutrition Education/Counseling:  Education completed (discussed supplements)   Coordination of Nutrition Care:  Continue to monitor while inpatient    Goals:  Meet estimated nutrient needs       Nutrition Monitoring and Evaluation:   Food/Nutrient Intake Outcomes:  Food and Nutrient Intake,Supplement Intake  Physical Signs/Symptoms Outcomes:  Biochemical Data,GI Status,Fluid Status or Edema,Skin,Weight     Discharge Planning:    Continue current diet     Electronically signed by David Biggs RD, LD on 3/17/22 at 12:55 PM EDT    Contact: [phone]

## 2022-03-17 NOTE — DISCHARGE SUMMARY
[place] [address] Internal Medicine    Discharge Summary     Patient ID: Reynaldo Damon  :  [birth year]   MRN: [id number]     ACCOUNT:  [de-identified]   Patient's PCP: Marta George MD  Admit Date: 3/1/2022   Discharge Date: 3/16/22  Length of Stay: 15  Code Status:  Full Code  Admitting Physician: Felicity Lucero MD  Discharge Physician: Felicity Lucero MD     Active Discharge Diagnoses:     Primary Problem  Acute GI bleeding      Hospital Problems  Active Hospital Problems    Diagnosis Date Noted    Mild malnutrition ([facility] [E44.1] 2022    Symptomatic anemia [D64.9]     Family history of colon cancer [Z80.0]     Family history of pancreatic cancer [Z80.0]     Elevated LFTs [R79.89] 2021    Paroxysmal atrial fibrillation ([address] [I48.0] 2020    Stage 3 chronic kidney disease ([facility] [address] [N18.30]        Admission Condition:  fair     Discharged Condition: fair    Hospital Stay:     Hospital Course:  Reynaldo Damon is a 66 y.o. female who was admitted for the management of Acute GI bleeding , presented to ER with Fatigue    76-year-old lady with history of atrial fibrillation history of coronary disease stent placed 20 years ago on Eliquis and Plavix 75 baseline hemoglobin was 10 2 weeks ago admitted with gradual increase of fatigue dyspnea exertion sleepy most part of the day no fever no vomiting blood no blood in the stool no dark stools no diarrhea hemoglobin ER was 4.4 suspected GI bleed Eliquis and Plavix held n.p.o. for now upper and lower endoscopies GI consulted  IV Venofer 200 mg daily  1 unit of PRBC given hemoglobin improved to 6 will need another unit of PRBC  Ckd,nephro consult,pt was due to see dr Yaima Hauser for ckd     EGD was difficult to obtain due to delirium, CO2 narcosis, respiratory acidosis, CHF exacerbation patient was transferred to ICU and intubated for airway protection on 3/7  Cardiology nephrology pulmonology following  Transfer out heart failure. Patient Status: Inpatient Height: 59 inches Weight: 152 pounds BSA: 1.64 m^2 BMI: 30.7 kg/m^2 Rhythm: Within normal limits HR: 88 bpm Allergies   - Lisinopril. - Keflex. - *Unlisted:(Aleve,Claratin, Pioglitazone). CONCLUSIONS Summary Limited study to assess CHF. Left ventricle is normal in size. Global left ventricular systolic function is normal. Estimated LV EF 60-65 %. No obvious wall motion abnormality seen. Left atrium is mildly dilated. Signature ----------------------------------------------------------------------------  Electronically signed by Awilda Montoya(Sonographer) on 03/14/2022 12:15  PM ---------------------------------------------------------------------------- ----------------------------------------------------------------------------  Electronically signed by Jann Duong(Interpreting physician) on 03/14/2022  12:16 PM ---------------------------------------------------------------------------- FINDINGS Left Atrium Left atrium is mildly dilated. Left Ventricle Left ventricle is normal in size. Global left ventricular systolic function is normal. Estimated LV EF 60-65 %. No obvious wall motion abnormality seen. Right Atrium Right atrium is normal in size. Right Ventricle Right ventricle was not well visualized. Mitral Valve Mild calcification of the posterior mitral annulus. Aortic Valve Aortic valve is sclerotic but opens well. Tricuspid Valve Tricuspid valve was not well visualized. Pulmonic Valve Pulmonic valve was not well visualized. Pericardial Effusion No significant pericardial effusion is seen. Pleural Effusion No pleural effusion seen.     XR CHEST (SINGLE VIEW FRONTAL)    Result Date: 3/12/2022  EXAMINATION: ONE XRAY VIEW OF THE CHEST 3/12/2022 7:16 am COMPARISON: 10 March 2022 HISTORY: ORDERING SYSTEM PROVIDED HISTORY: Evaluate effusions TECHNOLOGIST PROVIDED HISTORY: Evalaute effusions Reason for Exam: Evaluate effusions FINDINGS: AP portable view of the chest time stamped at 730 hours demonstrates overlying cardiac monitoring electrodes and prior median sternotomy. Cardiomegaly is noted, mild with bilateral effusions, and bibasilar opacities. The opacities have increased over prior study. Vascularity is top normal.     Cardiomegaly, bilateral effusions and increasing bibasilar opacities which may be related atelectasis 2 are effusion. No extrapleural air. XR CHEST (SINGLE VIEW FRONTAL)    Result Date: 3/7/2022  EXAMINATION: ONE XRAY VIEW OF THE CHEST 3/7/2022 6:10 am COMPARISON: 03/05/2022 HISTORY: ORDERING SYSTEM PROVIDED HISTORY: sob, chf, effusions TECHNOLOGIST PROVIDED HISTORY: sob, chf, effusions Reason for Exam: dyspnea FINDINGS: Sternal wires from prior heart surgery. Monitor leads overlie the chest.  A metallic clip projecting over the right lower chest is probably external to the patient. Stable cardiomegaly with calcific plaque of the thoracic aorta. Increasing perihilar congestive changes with hilar prominence which may all be vascular. Increasing small to moderate pleural effusions and bibasilar opacities which may be due to edema, atelectasis, or infection. The bones are osteopenic with degenerative changes. Calcific plaque of the thoracic aorta     Increasing congestive changes and small to moderate bilateral pleural effusions with adjacent airspace disease. XR CHEST (2 VW)    Result Date: 3/3/2022  EXAMINATION: TWO XRAY VIEWS OF THE CHEST 3/3/2022 4:40 pm COMPARISON: Chest 03/01/2022 HISTORY: Cough, CHF FINDINGS: The cardiac silhouette is enlarged. Calcifications involving the aorta reflect atherosclerosis. The mediastinal and hilar silhouettes appear unremarkable. Chronic appearing coarse interstitial densities predominate perihilar regions and lung bases, typical of sequela from smoking or other previous infectious/inflammatory process.   The lungs are hyperinflated with increased translucency both lung apices and tapering of the vasculature in the upper lungs. Vascular engorgement and cephalization is demonstrated with bilateral peribronchial cuffing and perivascular haziness. Small volume bilateral pleural effusion evident with bibasilar opacities. No pneumothorax is seen. Bone mineralization appears abnormally low. Hyperkyphosis thoracic spine. No acute osseous abnormality is identified. Stable sequela from open-heart surgery. 1.  Congestive heart failure is most likely given the radiographic findings; pneumonia is also a consideration in areas of consolidation with pleural effusion. 2.  Chronic appearing coarse interstitial densities predominate perihilar regions and lung bases, typical of sequela from smoking or other previous infectious/inflammatory process. 3. Calcific atherosclerosis aorta. 4. Cardiomegaly. CT HEAD WO CONTRAST    Result Date: 3/7/2022  EXAMINATION: CT OF THE HEAD WITHOUT CONTRAST  3/7/2022 1:18 pm TECHNIQUE: CT of the head was performed without the administration of intravenous contrast. Dose modulation, iterative reconstruction, and/or weight based adjustment of the mA/kV was utilized to reduce the radiation dose to as low as reasonably achievable. COMPARISON: CT brain performed 03/04/2022. HISTORY: ORDERING SYSTEM PROVIDED HISTORY: Irregular breathing pattern, unresponsive TECHNOLOGIST PROVIDED HISTORY: Irregular breathing pattern, unresponsive Reason for Exam: Irregular breathing pattern, unresponsive FINDINGS: BRAIN/VENTRICLES: There is no acute intracranial hemorrhage, mass effect, or midline shift. There is satisfactory overall gray-white matter differentiation. There is redemonstration of a meningioma adjacent to the anterior aspect of the right temporal lobe. There is a remote lacunar infarct in the right caudate head. There is cerebral atrophy. The ventricular structures are symmetric and unremarkable. The infratentorial structures are unremarkable.  ORBITS: The visualized portion of the orbits demonstrate no acute abnormality. SINUSES: The visualized paranasal sinuses and mastoid air cells demonstrate no acute abnormality. SOFT TISSUES/SKULL:  No acute abnormality of the visualized skull or soft tissues. Cerebral atrophy without acute intracranial abnormality. Stable meningioma adjacent to the right temporal lobe anteriorly. RECOMMENDATIONS: Unavailable     CT HEAD WO CONTRAST    Result Date: 3/4/2022  EXAMINATION: CT OF THE HEAD WITHOUT CONTRAST  3/4/2022 12:05 pm TECHNIQUE: CT of the head was performed without the administration of intravenous contrast. Dose modulation, iterative reconstruction, and/or weight based adjustment of the mA/kV was utilized to reduce the radiation dose to as low as reasonably achievable. COMPARISON: CT brain performed 05/25/2021. HISTORY: ORDERING SYSTEM PROVIDED HISTORY: delirium TECHNOLOGIST PROVIDED HISTORY: delirium Reason for Exam: delirium FINDINGS: BRAIN/VENTRICLES: There is no acute intracranial hemorrhage, mass effect, or midline shift. There is satisfactory overall gray-white matter differentiation. There is cerebral atrophy. The ventricular structures are symmetric and unremarkable. The infratentorial structures are unremarkable. ORBITS: The visualized portion of the orbits demonstrate no acute abnormality. SINUSES: The visualized paranasal sinuses and mastoid air cells demonstrate no acute abnormality. SOFT TISSUES/SKULL:  No acute abnormality of the visualized skull or soft tissues. Cerebral atrophy without acute intracranial abnormality. US LIVER    Result Date: 3/2/2022  EXAMINATION: RIGHT UPPER QUADRANT ULTRASOUND 3/2/2022 1:45 pm COMPARISON: CT abdomen and pelvis May 25, 2021 HISTORY: ORDERING SYSTEM PROVIDED HISTORY: elevated lft TECHNOLOGIST PROVIDED HISTORY: elevated lft FINDINGS: LIVER:  Diffuse increased liver parenchymal echogenicity is nonspecific but can be seen in patients with hepatic steatosis or diffuse hepatocellular process. .  No focal liver lesion. No intrahepatic biliary ductal dilatation. The portal vein is patent and demonstrates biphasic flow which can be seen in patients with tricuspid regurgitation. BILIARY SYSTEM:  Gallbladder is unremarkable without evidence of pericholecystic fluid, wall thickening or stones. Negative sonographic Graf's sign. Common bile duct is within normal limits measuring 4 mm. RIGHT KIDNEY: Diffuse increased renal parenchymal echogenicity suggestive chronic medical renal.  No right hydronephrosis PANCREAS:  Visualized portions of the pancreas are unremarkable. OTHER: No evidence of right upper quadrant ascites. Diffuse increased heterogeneous liver parenchymal echogenicity could be seen in patients with hepatic steatosis or diffuse hepatocellular process. Patent portal vein with biphasic flow can be seen in patient with tricuspid regurgitation. RECOMMENDATIONS: Unavailable     XR CHEST PORTABLE    Result Date: 3/10/2022  EXAMINATION: ONE XRAY VIEW OF THE CHEST 3/10/2022 11:00 am COMPARISON: Chest x-ray dated 10 March 2022, 0624 hours HISTORY: ORDERING SYSTEM PROVIDED HISTORY: Status post thoracentesis TECHNOLOGIST PROVIDED HISTORY: Status post thoracentesis Reason for Exam: Status post thoracentesis FINDINGS: The patient is rotated. Cardiomediastinal silhouette appears similar with median sternotomy wires. Improved aeration of the right lung with decrease in size of the previously seen pleural effusion. The left-sided pleural effusion and left basilar airspace disease appears similar to prior. No pneumothorax is visible. Interval decrease in size of a right pleural effusion with improved aeration of the right lung. Stable appearance of the left lung. XR CHEST PORTABLE    Result Date: 3/10/2022  EXAMINATION: ONE XRAY VIEW OF THE CHEST 3/10/2022 6:28 am COMPARISON: Chest radiograph performed 03/09/2022.  HISTORY: ORDERING SYSTEM PROVIDED HISTORY: Check lung status TECHNOLOGIST PROVIDED HISTORY: Check lung status Reason for Exam: sob FINDINGS: There are bilateral effusions with scattered infiltrates. There is no pneumothorax. The heart is enlarged. The upper abdomen is unremarkable. The extrathoracic soft tissues are unremarkable. Bilateral effusions with scattered infiltrates consistent with pneumonia. XR CHEST PORTABLE    Result Date: 3/9/2022  EXAMINATION: ONE XRAY VIEW OF THE CHEST 3/9/2022 5:45 am COMPARISON: 03/08/2022 HISTORY: ORDERING SYSTEM PROVIDED HISTORY: ETT placement FINDINGS: Unchanged positioning of endotracheal tube terminating above the kumar. Enteric tube transverses the diaphragm with the tip beyond the field of view. Stable enlargement of the cardiomediastinal silhouette. Status post median sternotomy. Bibasilar edema and pleural effusions again noted, slightly progressed on the right. No sizable pneumothorax. Continued bibasilar edema and pleural effusions, slightly progressed on the right. Life support devices, unchanged. XR CHEST PORTABLE    Result Date: 3/8/2022  EXAMINATION: ONE XRAY VIEW OF THE CHEST 3/8/2022 6:22 am COMPARISON: 03/07/2022 HISTORY: ORDERING SYSTEM PROVIDED HISTORY: ETT placement TECHNOLOGIST PROVIDED HISTORY: ETT placement Reason for Exam: ett placement FINDINGS: Endotracheal tube has been pulled back and is now approximately 2.5 cm above the kumar. Median sternotomy wires and enteric tube are noted. Heart size is stable. Small-to-moderate bilateral pleural effusions and bibasilar opacities are unchanged. No evidence of pneumothorax. 1.  Improved positioning of the endotracheal tube. 2.  Unchanged small-to-moderate pleural effusions and bibasilar opacities.      XR CHEST PORTABLE    Result Date: 3/8/2022  EXAMINATION: ONE XRAY VIEW OF THE CHEST 3/7/2022 12:52 pm COMPARISON: 03/07/2022 HISTORY: ORDERING SYSTEM PROVIDED HISTORY: Endotracheal tube position TECHNOLOGIST PROVIDED HISTORY: Endotracheal tube position Reason for Exam: ET and OG position FINDINGS: The patient has been intubated with the endotracheal tube tip approximately 2 cm into the right main bronchus. This should be retracted at least 4 cm. Oral enteric tube tip is in the stomach. Sternal wires from prior heart surgery. Again shown is a metallic clip overlying the right lower chest which is probably external to the patient. Stable cardiomegaly with congestive changes, small to moderate bilateral pleural effusions and adjacent atelectasis/airspace disease. Endotracheal tube tip is in the right main bronchus and should be retracted approximately 4 cm. OG tube tip is in the stomach. Findings were discussed with Chelo CARBALLO RN (will convey the results to Dr. Rachel Harrison) at 2:04 pm on 3/7/2022. XR CHEST PORTABLE    Result Date: 3/5/2022  EXAMINATION: ONE XRAY VIEW OF THE CHEST 3/5/2022 6:29 am COMPARISON: 3/4/2022 HISTORY: Acute respiratory failure. FINDINGS: Evaluation is suboptimal secondary to body habitus and portable technique. In addition, the patient's chin overlies the lung apices. Sternotomy changes. Cardiomediastinal silhouette remains enlarged. No significant change in bibasilar opacities compatible with pleural effusions and adjacent airspace disease. No pneumothorax seen. No significant change. XR CHEST PORTABLE    Result Date: 3/4/2022  EXAMINATION: ONE XRAY VIEW OF THE CHEST 3/4/2022 10:59 am COMPARISON: Chest x-ray dated 3 March 2022 HISTORY: ORDERING SYSTEM PROVIDED HISTORY: dyspnea TECHNOLOGIST PROVIDED HISTORY: dyspnea Reason for Exam: dyspnea FINDINGS: Stable cardiomegaly. No pneumothorax. Progression of perihilar and bibasilar airspace opacities. Small right pleural effusions.      Findings compatible with congestive heart failure with increased bilateral pleural effusion since the prior study     XR CHEST PORTABLE    Result Date: 3/1/2022  EXAMINATION: ONE XRAY VIEW OF THE CHEST 3/1/2022 7:43 pm COMPARISON: February 6, 2022 HISTORY: [id number] PROVIDED HISTORY: fatigue TECHNOLOGIST PROVIDED HISTORY: fatigue Reason for Exam: Fatigue, shortness of breath Additional signs and symptoms: Fatigue, shortness of breath Relevant Medical/Surgical History: Fatigue, shortness of breath FINDINGS: Marginal inspiration is present. Cardiomegaly is present. Vascular markings are engorged. Small pleural effusions are noted. No pneumothorax is present. Patient is status post prior sternotomy. No acute osseous abnormality is present. 1. Cardiomegaly, mild vascular congestion, improved since the prior study 2. Small bilateral pleural effusions     US THORACENTESIS Which side should the procedure be performed? Right    Result Date: 3/10/2022  Radiology exam is complete. No Radiologist dictation. Please follow up with ordering provider. Consultations:    Consults:     Final Specialist Recommendations/Findings:   IP CONSULT TO GI  IP CONSULT TO NEPHROLOGY  IP CONSULT TO PULMONOLOGY  IP CONSULT TO NEUROLOGY  IP CONSULT TO CARDIOLOGY  IP CONSULT TO DIETITIAN  IP CONSULT TO GI      The patient was seen and examined on day of discharge and this discharge summary is in conjunction with any daily progress note from day of discharge. Discharge plan:     Disposition: home      Instructions to Patient: Keep follow-up appointments      Requiring Further Evaluation/Follow Up POST HOSPITALIZATION/Incidental Findings:     Physician Follow Up:     [facility] [address] [place]   [phone]      call to see if you qualify for  any services such as home care, Home healtg aids, Transportation,     [address] [facility] [address] [facility] [address], [address] [facility], [address]  [address] [phone]      3-4 weeks for follow up from EGD/COLONOSCOPY    [address]  In 2 weeks      Kentrell Egan MD  [address] [facility] [address]  [facility] [place]   [phone]    Schedule an appointment as soon as possible for a visit  Follow up in 3-4 weeks. Ava Alex MD  [address] [facility] [address] [facility] [address]  [phone]    Schedule an appointment as soon as possible for a visit in 3 weeks  egd bx results    [employer]  [id number] [place] [address]. [address] [place] [address]  [phone]    call for oxygen delivery as soon as you get home.     Olga Raymundo MD  [address] [facility] [address]  [phone]    Schedule an appointment as soon as possible for a visit in 2 weeks         Activity: Resume as directed    Discharge Medications:      Medication List      START taking these medications    amiodarone 200 MG tablet  Commonly known as: CORDARONE  Take 1 tablet by mouth daily        CHANGE how you take these medications    apixaban 2.5 MG Tabs tablet  Commonly known as: ELIQUIS  Take 1 tablet by mouth 2 times daily  What changed:   · medication strength  · how much to take     vitamin D 1.25 MG (46715 UT) Caps capsule  Commonly known as: ERGOCALCIFEROL  Take 1 capsule by mouth once a week  What changed: additional instructions        CONTINUE taking these medications    Adjust Fold Cane/York Handle Misc  Use daily for walking     * albuterol (2.5 MG/3ML) 0.083% nebulizer solution  Commonly known as: PROVENTIL  Take 3 mLs by nebulization every 6 hours as needed for Wheezing or Shortness of Breath     * albuterol sulfate  (90 Base) MCG/ACT inhaler  Commonly known as: ProAir HFA  Inhale 2 puffs into the lungs every 6 hours as needed for Wheezing or Shortness of Breath (cough)     allopurinol 300 MG tablet  Commonly known as: ZYLOPRIM  Take 1 tablet by mouth daily Per rheumatologist     atorvastatin 40 MG tablet  Commonly known as: LIPITOR  Take 1 tablet by mouth once daily     blood glucose monitor kit and supplies  1 kit by Other route three times daily Dispense Butterfly Elite CBG Device     Blood Pressure Kit  1 kit by Does not apply route three times daily     bumetanide 1 MG tablet  Commonly known as: BUMEX  Take 1 tablet by mouth 2 times daily     Compressor/Nebulizer Misc  Nebulizer with compressor. Disposable Med Nebs 2 /month. Reusable Med Nebs 1 per 6 months. Aerosol Mask 1 per month. Replacement Filters 2 per month. All other related supplies as needed per month. Medications being used: Albuterol . 083 unit dose vial. Frequency: every 6 hrs x 4/day . Length of Need: 1     desloratadine 5 MG tablet  Commonly known as: CLARINEX  Take 1 tablet by mouth once daily     FreeStyle Lancets Misc  1 each by Does not apply route daily Patient needs to contact office before any further refills will be approved     FREESTYLE LITE strip  Generic drug: blood glucose test strips  1 each by In Vitro route daily As needed. Handicap Placard Misc  by Does not apply route Expires on 12/30/25     Hibiclens 4 % external liquid  Generic drug: chlorhexidine  for decontamination AND WASH LEGS EVERY DAY     magnesium oxide 400 (241.3 Mg) MG Tabs tablet  Commonly known as: MAG-OX  Take 1 tablet by mouth twice daily     metoprolol tartrate 50 MG tablet  Commonly known as: LOPRESSOR  Take 1 tablet by mouth 2 times daily     miconazole 2 % powder  Commonly known as: MICOTIN  Apply topically 2 times daily UNDER THE SKIN FOLDS LONG TERM     mupirocin 2 % cream  Commonly known as: Bactroban  Apply 2 times dailY X 10 DAYS ON OPEN BLISTERS ON THE LEGS. therapeutic multivitamin-minerals tablet  Take 1 tablet by mouth daily     xeroform petrolatum patch 2\"X2\" Pads external pads  Apply 1 each topically daily         * This list has 2 medication(s) that are the same as other medications prescribed for you. Read the directions carefully, and ask your doctor or other care provider to review them with you.             STOP taking these medications    amLODIPine 10 MG tablet  Commonly known as: NORVASC     ciprofloxacin 500 MG tablet  Commonly known as: CIPRO     clopidogrel 75 MG tablet  Commonly known as: PLAVIX     colchicine 0.6 MG tablet  Commonly known as: Colcrys           Where to Get Your Medications      These medications were sent to [facility], [address] [place] [id number]    Phone: [phone]   · amiodarone 200 MG tablet  · apixaban 2.5 MG Tabs tablet         Time Spent on discharge is  35 mins in patient examination, evaluation, counseling as well as medication reconciliation, prescriptions for required medications, discharge plan and follow up. Electronically signed by   Crista Paniagua MD  3/17/2022  9:37 AM      Thank you Dr. Mario Munoz MD for the opportunity to be involved in this patient's care.

## 2022-03-17 NOTE — PROGRESS NOTES
[facility]    Progress Note      Name:   Barbara Root  MRN:     [id number]     Acct:      [de-identified]   Room:   2047/2047-01  IP Day:  13  Admit Date:  3/1/2022  9:47 PM    PCP:   Holley Petit MD  Code Status:  Full Code    Subjective:     C/C:   Chief Complaint   Patient presents with    Fatigue         HPI:     See HPI    Review of Systems:   Positive for shortness of breath no new cough  Denies chest pain or palpitations  Denies abdominal pain, diarrhea vomiting  Denies any new numbness tremors or weakness. Medications: Allergies:     Allergies   Allergen Reactions    Latex Rash    Aleve [Naproxen Sodium]      Chronic kidney disease stage III, CHF    Pioglitazone Other (See Comments)     Congestive heart failure    Claritin [Loratadine]     Keflex [Cephalexin]     Lisinopril      Needs clarification of contraindication       Current Meds:   Scheduled Meds:    apixaban  2.5 mg Oral BID    amiodarone  200 mg Oral Daily    metoprolol tartrate  50 mg Oral BID    [Held by provider] bumetanide  2 mg IntraVENous TID    ipratropium-albuterol  1 ampule Inhalation Q4H    sodium chloride flush  5-40 mL IntraVENous 2 times per day    allopurinol  300 mg Oral Daily    atorvastatin  40 mg Oral Daily    magnesium oxide  400 mg Oral BID    miconazole   Topical BID    therapeutic multivitamin-minerals  1 tablet Oral Daily    pantoprazole  40 mg IntraVENous Daily    And    sodium chloride flush  10 mL IntraVENous Daily     Continuous Infusions:    sodium chloride 25 mL (03/12/22 1048)    sodium chloride       PRN Meds: benzonatate, dextromethorphan, sodium chloride flush, sodium chloride, ondansetron **OR** ondansetron, magnesium hydroxide, acetaminophen **OR** acetaminophen, sodium chloride    Data:     Past Medical History:   has a past medical history of Acute CVA (cerebrovascular accident) ([facility] [address] Altered mental status, Arthritis, Brain mass, Cellulitis and abscess, Cellulitis and abscess of unspecified site, Cellulitis of both lower extremities, Cellulitis of left lower extremity, Cellulitis of lower extremity, CHF (congestive heart failure) (HCC), Chronic kidney disease, unspecified, Coronary atherosclerosis of native coronary artery, Essential hypertension, Essential hypertension, malignant, Hallucinations, Hard of hearing, Head contusion, Hypokalemia, Iron deficiency anemia, unspecified, Meningioma ([address] Myocardial infarction ([facility] [address] Other and unspecified hyperlipidemia, Pure hypercholesterolemia, Toxic metabolic encephalopathy, Type II or unspecified type diabetes mellitus without mention of complication, not stated as uncontrolled, Unspecified asthma(493.90), Unspecified venous (peripheral) insufficiency, Unspecified vitamin D deficiency, and UTI (urinary tract infection). Social History:   reports that she quit smoking about 22 years ago. She has a 2.50 pack-year smoking history. She has never used smokeless tobacco. She reports previous alcohol use. She reports that she does not use drugs. Family History:   Family History   Problem Relation Age of Onset    Diabetes Mother     Heart Disease Mother     Heart Disease Father     Diabetes Sister     High Blood Pressure Sister     Diabetes Maternal Grandmother        Vitals:  /63   Pulse 73   Temp 98.1 °F (36.7 °C)   Resp 18   Ht 4' 11\" (1.499 m)   Wt 132 lb (59.9 kg)   SpO2 98%   BMI 26.66 kg/m²   Temp (24hrs), Av.8 °F (36.6 °C), Min:97.5 °F (36.4 °C), Max:98.1 °F (36.7 °C)    Recent Labs     22  0620 22  1114 22  1603 22  1950   POCGLU 77 153* 127* 179*       I/O (24Hr):     Intake/Output Summary (Last 24 hours) at 3/17/2022 0937  Last data filed at 3/17/2022 0730  Gross per 24 hour   Intake --   Output 550 ml   Net -550 ml       Labs:      Lab Results   Component Value Date/Time    SPECIAL RIGHT 03/10/2022 11:00 AM     Lab Results Component Value Date/Time    CULTURE NO GROWTH 6 DAYS 03/10/2022 11:00 AM         Radiology:    Recent data reviewed    Physical Examination:        General appearance:  alert, cooperative and no distress  Eyes: Anicteric sclera. Pupils are equally round and reactive to light. Extraocular movements are intact. Lungs:  B/l reduced air entry, bilateral crackles.    Heart:  regular rate and rhythm, no murmur  Abdomen:  soft, nontender, nondistended, normal bowel sounds, no masses, hepatomegaly, splenomegaly  Extremities:  no edema, redness, tenderness in the calves  Skin:  no gross lesions, rashes, induration  Neuro:  Alert, oriented X 3, no new focal weakness  Assessment:        Primary Problem  Acute GI bleeding    Active Hospital Problems    Diagnosis Date Noted    Mild malnutrition ([facility] [E44.1] 03/08/2022    Symptomatic anemia [D64.9]     Family history of colon cancer [Z80.0]     Family history of pancreatic cancer [Z80.0]     Elevated LFTs [R79.89] 02/25/2021    Paroxysmal atrial fibrillation ([facility] [I48.0] 07/28/2020    Stage 3 chronic kidney disease ([facility] [N18.30]              Plan:        76-year-old lady with history of atrial fibrillation history of coronary disease stent placed 20 years ago on Eliquis and Plavix 75 baseline hemoglobin was 10 2 weeks ago admitted with gradual increase of fatigue dyspnea exertion sleepy most part of the day no fever no vomiting blood no blood in the stool no dark stools no diarrhea hemoglobin ER was 4.4 suspected GI bleed Eliquis and Plavix held n.p.o. for now upper and lower endoscopies GI consulted  IV Venofer 200 mg daily  1 unit of PRBC given hemoglobin improved to 6 will need another unit of PRBC  Ckd,nephro consult,pt was due to see dr Jacoby Lacy for ckd    Needs EGD but has been difficult to obtain due to delirium, CO2 narcosis, respiratory acidosis, CHF exacerbation  Cardiology nephrology pulmonology following    3/7  Was moved out from ICU last night  Did not wear BiPAP overnight, chest x-ray worsening with bilateral effusions-we will await blood recommendation regarding thoracentesis  ABG did not show hypoxia hypercapnia  Was also started on Seroquel yesterday for hallucination anxiety and insomnia-suspect residual sedation from this; will reevaluate later today  EGD canceled for today      3/11  Patient was extubated on 3/9, mental status back to baseline respiration improved, transferred to floor on 3/10  Underwent thoracentesis on the right on 3/ ml out  Continues to be diuresed with IV Bumex 2mg tid  Has bilateral crackes, cough- needs to continue iv diuresis  CXR tomorrow  EGD will be done outpatiet    3/12  Overall doing well, oxygen requirements reduced to 1.5 L by nasal cannula  Continues on IV Bumex diuresis  Creatinine improving  Hemoglobin better today at 8.9  Repeat echo ordered by cardiology and pending    3/13  Patient continues diuresis, metolazone added to her diuretics, creatinine continues to improve  Hemoglobin 9 today has been uptrending; Eliquis has been resumed today will monitor H&H  Patient appears brighter, in no distress, doing well on 1 L nasal cannula oxygen  Repeat echo to be done tomorrow  Plan discussed with daughter Sherry Musa in room-if H&H is stable tomorrow we can plan on discharging patient back to home tomorrow  Okay to move to [facility] today    3/14  After detailed discussion with daughter and GI team yesterday plan was made to proceed with EGD and colonoscopy tomorrow  Hemoglobin stable  Repeat echo reviewed EF 65%  Patient reports feeling well today, very nervous about procedure tomorrow but willing to proceed denies any pain    3/15  Patient seen soon after return from EGD and colonoscopy both of which were essentially unremarkable  Eliquis resumed will watch H&H  Anticipate discharge tomorrow    3/16  H&H stable, Eliquis resumed, Plavix held per cardiology  Chest x-ray shows atelectasis questionable mucous plugging-discussed with pulmonology okay for discharge  Home O2 eval  PT/OT assessment  Patient ready for discharge depending on above      Layla Garcia MD  3/16/22

## 2022-03-17 NOTE — PLAN OF CARE
Nutrition Problem #1: Mild malnutrition  Intervention: Food and/or Nutrient Delivery: Continue Current Diet,Start Oral Nutrition Supplement  Nutritional Goals: Meet estimated nutrient needs

## 2022-03-17 NOTE — PLAN OF CARE
Problem: Skin Integrity:  Goal: Will show no infection signs and symptoms  Description: Will show no infection signs and symptoms  3/17/2022 0528 by Alina Kern RN  Outcome: Met This Shift  3/16/2022 1828 by Carly Amaya RN  Outcome: Ongoing  Goal: Absence of new skin breakdown  Description: Absence of new skin breakdown  3/17/2022 0528 by Alina Kern RN  Outcome: Met This Shift  3/16/2022 1828 by Carly Amaya RN  Outcome: Ongoing     Problem: Falls - Risk of:  Goal: Will remain free from falls  Description: Will remain free from falls  3/17/2022 0528 by Alina Kern RN  Outcome: Met This Shift  3/16/2022 1828 by Carly Amaya RN  Outcome: Ongoing  Goal: Absence of physical injury  Description: Absence of physical injury  3/17/2022 0528 by Alina Kern RN  Outcome: Met This Shift  3/16/2022 1828 by Carly Amaya RN  Outcome: Ongoing     Problem: Nutrition  Goal: Optimal nutrition therapy  Outcome: Met This Shift     Problem: Pain:  Goal: Pain level will decrease  Description: Pain level will decrease  3/17/2022 0528 by Alina Kern RN  Outcome: Met This Shift  3/16/2022 1828 by Carly Amaya RN  Outcome: Ongoing  Goal: Control of acute pain  Description: Control of acute pain  3/17/2022 0528 by Alina Kern RN  Outcome: Met This Shift  3/16/2022 1828 by Carly Amaya RN  Outcome: Ongoing  Goal: Control of chronic pain  Description: Control of chronic pain  3/17/2022 0528 by Alina Kern RN  Outcome: Met This Shift  3/16/2022 1828 by Carly Amaya RN  Outcome: Ongoing

## 2022-03-17 NOTE — PROGRESS NOTES
Department of Internal Medicine  Nephrology Ethel Bar MD  Progress note    Reason for consultation: Management of acute kidney injury superimposed on chronic kidney disease stage III. Consulting physician: Audrey Cr MD.    Interval history:   Patient was seen and examined. Shortness of breath is slowly improving. Patient had EGD colonoscopy yesterday scheduled for capsular endoscopy in the outpatient. Colonoscopy Showed diverticulosis. EGD showed no acute bleeding ulcer. Renal function continues to worsen on high-dose diuretics. Creatinine of 1.8 mg/dl today.    -patient developed acute hypoxic respiratory failure in the ICU and is  status post extubation on 3/9/2022. She underwent right thoracentesis with removal of 800 mL of pleural fluid on 3/10/22  Laboratory studies were reviewed. Renal function is stable, serum creatinine 1.3 mg/dL    History of present illness: This is a 66 y.o. female with a significant past medical history of Heart Failure with reduced ejection fraction [HFrEF with LVEF 45 to 50% in July 2020 secondary to ischemic cardiomyopathy], Coronary artery disease, Meningioma, type 2 diabetes mellitus [with nonproliferative diabetic retinopathy], essential hypertension, bilateral deafness and chronic kidney disease stage III [baseline serum creatinine 1.6 mg/dL and follows up with Dr. Kory Low of renal services of [place] whom she saw 1 week ago and was switched from Lasix to Bumex], presented with complaints of fatigue and dyspnea with exertion progressively worsened over 2 weeks. She had been recently admitted and seen by us at [facility] on 2/11/2022. Laboratory studies at presentation however were remarkable for hemoglobin 7.4 g/dL and BUN/creatinine 110/2.11 mg/dL. She denies hemoptysis or melena stools. However, hemoglobin dropped overnight to 4.4 g/dL and she is scheduled for endoscopy tomorrow.     Scheduled Meds:   apixaban  2.5 mg Oral BID    PHUR 5.0 02/22/2022    WBCUA 10 TO 20 02/22/2022    RBCUA TOO NUMEROUS TO COUNT 02/22/2022    MUCUS NOT REPORTED 02/07/2022    TRICHOMONAS NOT REPORTED 02/07/2022    YEAST NOT REPORTED 02/07/2022    BACTERIA None 02/22/2022    SPECGRAV 1.016 02/22/2022    LEUKOCYTESUR NEGATIVE 02/22/2022    UROBILINOGEN Normal 02/22/2022    BILIRUBINUR NEGATIVE 02/22/2022    GLUCOSEU TRACE 02/22/2022    KETUA NEGATIVE 02/22/2022    AMORPHOUS NOT REPORTED 02/07/2022     Urine Protein:     Lab Results   Component Value Date     10/27/2020     Urine Creatinine:     Lab Results   Component Value Date    LABCREA 26.2 05/26/2021     Imaging studies. Chest x-ray increasing congestive changes small-to-moderate bilateral pleural effusion      IMPRESSION/RECOMMENDATIONS:      1. Acute kidney injury superimposed on chronic kidney disease stage III - most consistent with prerenal azotemia led to dehydration and anemia. Renal function is worse  with creatinine of 1.83 mg/dL today. Monitor urine output closely. Avoid nephrotoxic agents. Basic metabolic profile daily. Hold IV bumex. 2.  Systemic hypertension - Blood pressure is adequately controlled. 3.  Acute on chronic anemia - rule out GI bleed. Hemoglobin today is 8.9 g/dL. 4.  Decompensated heart failure with preserved ejection fraction [HFpEF] -  Monitor strict I's and O's, 1500 mils fluid restriction and 2 g sodium diet dailyRepeat limited echo shows EF of 60 to 65%  No current indication for ultrafiltration. 5.  Right pleural effusion s/p thoracentesis [3/10/2022]. No objections to discharge from renal standpoint. Resume oral Bumex 1 mg twice daily. Monitor basic metabolic panel in 1 week and CBC and follow CKD clinic in 3 weeks. Eliquis dose has been adjusted to 2.5 mg twice daily for current GFR    Prognosis is guarded.         Zaki Mcallister MD   Attending Nephrologist  3/17/2022 12:24 PM

## 2022-03-18 ENCOUNTER — TELEPHONE (OUTPATIENT)
Dept: FAMILY MEDICINE CLINIC | Age: 72
End: 2022-03-18

## 2022-03-18 ENCOUNTER — TELEPHONE (OUTPATIENT)
Dept: GASTROENTEROLOGY | Age: 72
End: 2022-03-18

## 2022-03-18 NOTE — TELEPHONE ENCOUNTER
Patient LVM to make an appointment with Dr. Lise Verdugo. Recently had an EGD done by Dr. Danette Claros. This is an established patient of Dr. Maciej Godinez from 6/8/10.

## 2022-03-18 NOTE — TELEPHONE ENCOUNTER
[facility] Transitions Initial Follow Up Call    Outreach made within 2 business days of discharge: Yes    Patient: Shelly Fields Patient : 1950   MRN: [id number]  Reason for Admission: There are no discharge diagnoses documented for the most recent discharge. Discharge Date: 3/17/22       Spoke with: [facility]    Discharge department/facility: [address] [place] [address] Interactive Patient Contact:  Was patient able to fill all prescriptions:   Was patient instructed to bring all medications to the follow-up visit:   Is patient taking all medications as directed in the discharge summary?    Does patient understand their discharge instructions:   Does patient have questions or concerns that need addressed prior to 7-14 day follow up office visit:     Scheduled appointment with PCP within 7-14 days    Follow Up  Future Appointments   Date Time Provider Angela Reyez   3/31/2022 11:15 AM [facility]   4/15/2022 12:45 PM MD Elysia [facility]   2022  3:00 PM Ruby Skelton APRN - CNP [facility]   2022  2:00 PM MD [facility]   2022  3:30 PM [facility]   2022  3:30 PM Vickie Danielson MD [facility] [address] [place]

## 2022-03-22 NOTE — TELEPHONE ENCOUNTER
Pt lvm to cancel appt. Writer called pt back, daughter answered and said she would call back later to [facility] appt.

## 2022-03-24 ENCOUNTER — HOSPITAL ENCOUNTER (OUTPATIENT)
Age: 72
Setting detail: SPECIMEN
Discharge: HOME | End: 2022-03-24

## 2022-03-24 ENCOUNTER — HOSPITAL ENCOUNTER (OUTPATIENT)
Dept: GENERAL RADIOLOGY | Facility: CLINIC | Age: 72
Discharge: HOME | End: 2022-03-26
Payer: MEDICARE

## 2022-03-24 ENCOUNTER — HOSPITAL ENCOUNTER (OUTPATIENT)
Facility: CLINIC | Age: 72
Discharge: HOME | End: 2022-03-26
Payer: MEDICARE

## 2022-03-24 DIAGNOSIS — J44.9 CHRONIC OBSTRUCTIVE PULMONARY DISEASE, UNSPECIFIED COPD TYPE (HCC): ICD-10-CM

## 2022-03-24 DIAGNOSIS — N17.9 ACUTE KIDNEY INJURY (HCC): ICD-10-CM

## 2022-03-24 LAB
ANION GAP SERPL CALCULATED.3IONS-SCNC: 14 MMOL/L (ref 9–17)
BUN BLDV-MCNC: 71 MG/DL (ref 8–23)
CALCIUM SERPL-MCNC: 8.7 MG/DL (ref 8.6–10.4)
CHLORIDE BLD-SCNC: 94 MMOL/L (ref 98–107)
CO2: 32 MMOL/L (ref 20–31)
CREAT SERPL-MCNC: 1.8 MG/DL (ref 0.5–0.9)
GFR AFRICAN AMERICAN: 34 ML/MIN
GFR NON-AFRICAN AMERICAN: 28 ML/MIN
GFR SERPL CREATININE-BSD FRML MDRD: ABNORMAL ML/MIN/{1.73_M2}
GLUCOSE BLD-MCNC: 137 MG/DL (ref 70–99)
HCT VFR BLD CALC: 33 % (ref 36.3–47.1)
HEMOGLOBIN: 10 G/DL (ref 11.9–15.1)
MCH RBC QN AUTO: 30.3 PG (ref 25.2–33.5)
MCHC RBC AUTO-ENTMCNC: 30.3 G/DL (ref 28.4–34.8)
MCV RBC AUTO: 100 FL (ref 82.6–102.9)
NRBC AUTOMATED: 0 PER 100 WBC
PDW BLD-RTO: 18 % (ref 11.8–14.4)
PLATELET # BLD: 257 K/UL (ref 138–453)
PMV BLD AUTO: 11.1 FL (ref 8.1–13.5)
POTASSIUM SERPL-SCNC: 4 MMOL/L (ref 3.7–5.3)
RBC # BLD: 3.3 M/UL (ref 3.95–5.11)
SODIUM BLD-SCNC: 140 MMOL/L (ref 135–144)
WBC # BLD: 7.1 K/UL (ref 3.5–11.3)

## 2022-03-24 PROCEDURE — 71046 X-RAY EXAM CHEST 2 VIEWS: CPT

## 2022-03-25 NOTE — RESULT ENCOUNTER NOTE
Management per nephrologist, stable chronic kidney disease stage IV  Improving anemia  Future Appointments  3/31/2022  11:15 AM   Corrinne Burows, MD          [facility]  4/15/2022  12:45 PM   Samreen Causey MD   [facility]        [facility]  4/18/2022  3:00 PM    Ruby Skelton APRN -* [facility]  4/28/2022  2:00 PM    Nikhil Jones MD     [facility]  5/4/2022   3:30 PM    MD mary Pacheco [facility]  11/25/2022 3:30 PM    Nikhil Jones MD     [facility]               Rey Hicks

## 2022-03-31 ENCOUNTER — TELEPHONE (OUTPATIENT)
Dept: ONCOLOGY | Age: 72
End: 2022-03-31

## 2022-03-31 ENCOUNTER — OFFICE VISIT (OUTPATIENT)
Dept: FAMILY MEDICINE CLINIC | Age: 72
End: 2022-03-31
Payer: MEDICARE

## 2022-03-31 VITALS
BODY MASS INDEX: 26.89 KG/M2 | HEART RATE: 96 BPM | OXYGEN SATURATION: 97 % | SYSTOLIC BLOOD PRESSURE: 130 MMHG | TEMPERATURE: 97.1 F | HEIGHT: 59 IN | DIASTOLIC BLOOD PRESSURE: 70 MMHG | WEIGHT: 133.4 LBS

## 2022-03-31 DIAGNOSIS — D50.9 IRON DEFICIENCY ANEMIA, UNSPECIFIED IRON DEFICIENCY ANEMIA TYPE: ICD-10-CM

## 2022-03-31 DIAGNOSIS — J44.9 CHRONIC OBSTRUCTIVE PULMONARY DISEASE, UNSPECIFIED COPD TYPE (HCC): ICD-10-CM

## 2022-03-31 DIAGNOSIS — N18.32 STAGE 3B CHRONIC KIDNEY DISEASE (HCC): ICD-10-CM

## 2022-03-31 DIAGNOSIS — R68.89 DECREASED STRENGTH, ENDURANCE, AND MOBILITY: ICD-10-CM

## 2022-03-31 DIAGNOSIS — Z74.09 DECREASED STRENGTH, ENDURANCE, AND MOBILITY: ICD-10-CM

## 2022-03-31 DIAGNOSIS — D64.9 SEVERE ANEMIA: Primary | ICD-10-CM

## 2022-03-31 DIAGNOSIS — N17.9 ACUTE KIDNEY INJURY SUPERIMPOSED ON CKD (HCC): ICD-10-CM

## 2022-03-31 DIAGNOSIS — L03.115 CELLULITIS OF BOTH LOWER EXTREMITIES: ICD-10-CM

## 2022-03-31 DIAGNOSIS — I50.43 ACUTE ON CHRONIC COMBINED SYSTOLIC (CONGESTIVE) AND DIASTOLIC (CONGESTIVE) HEART FAILURE (HCC): ICD-10-CM

## 2022-03-31 DIAGNOSIS — L03.116 CELLULITIS OF BOTH LOWER EXTREMITIES: ICD-10-CM

## 2022-03-31 DIAGNOSIS — E11.22 TYPE 2 DIABETES MELLITUS WITH STAGE 3A CHRONIC KIDNEY DISEASE, WITHOUT LONG-TERM CURRENT USE OF INSULIN (HCC): ICD-10-CM

## 2022-03-31 DIAGNOSIS — N18.31 TYPE 2 DIABETES MELLITUS WITH STAGE 3A CHRONIC KIDNEY DISEASE, WITHOUT LONG-TERM CURRENT USE OF INSULIN (HCC): ICD-10-CM

## 2022-03-31 DIAGNOSIS — R53.1 DECREASED STRENGTH, ENDURANCE, AND MOBILITY: ICD-10-CM

## 2022-03-31 DIAGNOSIS — N18.9 ACUTE KIDNEY INJURY SUPERIMPOSED ON CKD (HCC): ICD-10-CM

## 2022-03-31 LAB — HBA1C MFR BLD: 4.6 %

## 2022-03-31 PROCEDURE — 83036 HEMOGLOBIN GLYCOSYLATED A1C: CPT | Performed by: FAMILY MEDICINE

## 2022-03-31 PROCEDURE — 99495 TRANSJ CARE MGMT MOD F2F 14D: CPT | Performed by: FAMILY MEDICINE

## 2022-03-31 PROCEDURE — 1111F DSCHRG MED/CURRENT MED MERGE: CPT | Performed by: FAMILY MEDICINE

## 2022-03-31 RX ORDER — FERROUS SULFATE 325(65) MG
325 TABLET ORAL
Qty: 180 TABLET | Refills: 1 | Status: SHIPPED | OUTPATIENT
Start: 2022-03-31 | End: 2022-06-22 | Stop reason: SDUPTHER

## 2022-03-31 RX ORDER — AMIODARONE HYDROCHLORIDE 200 MG/1
TABLET ORAL
Qty: 30 TABLET | Refills: 0 | OUTPATIENT
Start: 2022-03-31

## 2022-03-31 ASSESSMENT — ENCOUNTER SYMPTOMS
SINUS PRESSURE: 0
ABDOMINAL PAIN: 0
COUGH: 0
CHEST TIGHTNESS: 1
NAUSEA: 0
SORE THROAT: 0
RHINORRHEA: 0
BACK PAIN: 1
STRIDOR: 0
VOMITING: 0
CONSTIPATION: 0
DIARRHEA: 0
SHORTNESS OF BREATH: 1
TROUBLE SWALLOWING: 0
RECTAL PAIN: 0
BLOOD IN STOOL: 0
WHEEZING: 1
COLOR CHANGE: 1
EYE REDNESS: 0
ABDOMINAL DISTENTION: 1

## 2022-03-31 NOTE — PROGRESS NOTES
Post-Discharge Transitional Care Follow Up      Juan Antonio Ojeda   YOB: 1950    Date of Office Visit:  3/31/2022  Date of Hospital Admission: 3/1/22  Date of Hospital Discharge: 3/17/22  Readmission Risk Score (high >=14%. Medium >=10%):Readmission Risk Score: 25.1 ( )      Care management risk score Rising risk (score 2-5) and Complex Care (Scores >=6): 5     Non face to face  following discharge, date last encounter closed (first attempt may have been earlier): 3/18/2022  4:50 PM     Call initiated 2 business days of discharge: Yes     Severe anemia  Not sure about the cause likely multifactorial due to anticoagulants, decrease the dose, follow-up with hematologist start on iron supplements recent hemoglobin is normal recheck hemoglobin. Off of Plavix  -     ferrous sulfate (IRON 325) 325 (65 Fe) MG tablet; Take 1 tablet by mouth daily (with breakfast), Disp-180 tablet, R-1Normal  -     Hemoglobin and Hematocrit; Future  -     Ambulatory referral to Hematology Oncology  -     Basic Metabolic Panel; Future  -     [place] DISCHARGE MEDS RECONCILED W/ CURRENT OUTPATIENT MED LIST  Iron deficiency anemia, unspecified iron deficiency anemia type  Follow-up with GI start on iron supplements. Monitor hemoglobin  -     ferrous sulfate (IRON 325) 325 (65 Fe) MG tablet; Take 1 tablet by mouth daily (with breakfast), Disp-180 tablet, R-1Normal  -     Hemoglobin and Hematocrit; Future  -     Ambulatory referral to Hematology Oncology  -     [place] DISCHARGE MEDS RECONCILED W/ CURRENT OUTPATIENT MED LIST  Stage 3b chronic kidney disease ([address]  Worsening of creatinine follow-up with nephrologist  Acute on chronic combined systolic (congestive) and diastolic (congestive) heart failure ([address]  At her baseline continue diuretics monitoring her weight.   -     [place] DISCHARGE MEDS RECONCILED W/ CURRENT OUTPATIENT MED LIST  -     Amb External Referral To Cardiology  Acute kidney injury superimposed on CKD ([address]  Worsening of kidney function, follow-up with nephrologist repeat BMP  -     Basic Metabolic Panel; Future  -     [place] DISCHARGE MEDS RECONCILED W/ CURRENT OUTPATIENT MED LIST  Type 2 diabetes mellitus with stage 3a chronic kidney disease, without long-term current use of insulin (HCC)  Off of medications continue to monitor  -     POCT glycosylated hemoglobin (Hb A1C)  -     Microalbumin / Creatinine Urine Ratio; Future  Decreased strength, endurance, and mobility  Patient refused home health increased to get physical therapy, daughter reports she will call back if she changes her mind. Cellulitis of both lower extremities   Encouraged daughter to keep legs wrapped. Chronic obstructive pulmonary disease, unspecified COPD type ([address]  Continue aerosol treatments. Medical Decision Making: high complexity  Return in about 4 weeks (around 4/28/2022) for 30min,always chronic conditons. On this date 3/31/2022 I have spent 60 minutes reviewing previous notes, test results and face to face with the patient discussing the diagnosis and importance of compliance with the treatment plan as well as documenting on the day of the visit. Subjective:   HPI:  Patient presented with her daughter for hospital follow-up. Patient was admitted with severe fatigue went to the ER about 2 weeks ago. Patient had blood work done and hemoglobin was 4.4. Patient was seen about 1 week before for hospital follow-up with acute CHF. Patient received 2 blood transfusions, had colonoscopy done which did not show any acute bleeding. Patient could not tolerate the EGD due to respiratory failure and got intubated. Patient was discharged to home without home care. Current hemoglobin is 10.2 is not on any iron supplements. Patient was on Plavix and Eliquis, Plavix discontinued and is on Eliquis 2.5 mg which was decreased. Patient has increasing shortness of breath and CHF, is on diuretics reports her symptoms are fairly stable.   Leg swelling has improved has chronic cellulitis of lower extremities at her baseline. Daughter reports she is not letting her wrap her legs and clean her legs. Patient refused home care, reports she does not like any strangers at home. She has decreased strength of lower extremities, she was using walker before hospitalization now she is on wheelchair. Her daughter brought her wheelchair. Patient is refusing any home physical therapy. Reports she has a daughter and son who takes care of her. COPD, reports she will  nebulizer today was prescribed 1 week before. Acute kidney injury on CKD, has appointment with nephrologist creatinine has increased to 1.8 acute ischemia. Diabetes A1c is 4.8 controlled not on any medications. Patient has appointment with cardiologist as well as nephrologist.     [id number] Score for Atrial Fibrillation Stroke Risk   Risk   Factors  Component Value   C CHF Yes 1   H HTN Yes 1   A2 Age >= 76 No,  (72 y.o.) 0   D DM Yes 1   S2 Prior Stroke/TIA No 0   V Vascular Disease No 0   A Age 74-69 Yes,  (72 y.o.) 1   Sc Sex female 1    [id number]  Score  5   Score last updated 3/31/22 0:70 PM EDT    Click here for a link to the UpToDate guideline \"Atrial Fibrillation: Anticoagulation therapy to prevent embolization    Disclaimer: Risk Score calculation is dependent on accuracy of patient problem list and past encounter diagnosis.     74-year-old lady with history of atrial fibrillation history of coronary disease stent placed 20 years ago on Eliquis and Plavix 75 baseline hemoglobin was 10 2 weeks ago admitted with gradual increase of fatigue dyspnea exertion sleepy most part of the day no fever no vomiting blood no blood in the stool no dark stools no diarrhea hemoglobin ER was 4.4 suspected GI bleed Eliquis and Plavix held n.p.o. for now upper and lower endoscopies GI consulted  IV Venofer 200 mg daily  1 unit of PRBC given hemoglobin improved to 6 will need another unit of PRBC  Ckd,nephro consult,pt was due to see dr Aneudy Ross for ckd     EGD was difficult to obtain due to delirium, CO2 narcosis, respiratory acidosis, CHF exacerbation patient was transferred to ICU and intubated for airway protection on 3/7  Cardiology nephrology pulmonology following  Transfer out of ICU on 3/9  Underwent thoracentesis right-sided on 3/10  Continue diuresis with IV Bumex  Oxygen requirements gradually improved  Subsequently underwent EGD and colonoscopy on 3/15both essentially unremarkable     Eliquis was resumed, Plavix continued to be held  Needs cardiology follow-up 2 weeks after discharge also needs follow-up CBC BMP outpatient          Inpatient course: Discharge summary reviewed- see chart. Interval history/Current status: at her baseline physically weak using wheelchair    Patient Active Problem List   Diagnosis    Vitamin D deficiency    Venous insufficiency of both lower extremities    Asthma    Type 2 diabetes mellitus with stage 3 chronic kidney disease, without long-term current use of insulin ([address]    Coronary artery disease involving native coronary artery of native heart without angina pectoris    Iron deficiency anemia    Stage 3 chronic kidney disease ([address]    Colonoscopy refused    Pneumococcal vaccination declined    Impaired hearing    Chronic diastolic CHF (congestive heart failure) ([address]    COPD (chronic obstructive pulmonary disease) ([address]    HTN.  Benign hypertension with CKD (chronic kidney disease) stage III (HCC)    Gout with tophi    Hyperlipidemia with target LDL less than 70    Dry skin dermatitis HANDS    Meningioma, cerebral (HCC)    Paroxysmal atrial fibrillation (HCC)    Influenza vaccination declined    Recurrent UTI    Elevated LFTs    Cellulitis of both lower extremities    Hypomagnesemia    Kidney stones    Diverticulosis    COVID-19 vaccination declined    Acute kidney injury ([address]    Acute on chronic combined systolic (congestive) and diastolic (congestive) heart failure (HCC)    Chronic a-fib (HCC)    Chronic venous stasis dermatitis of both lower extremities    Symptomatic anemia    Family history of colon cancer    Family history of pancreatic cancer    Mild malnutrition ([address]    Decreased strength, endurance, and mobility       Medications listed as ordered at the time of discharge from hospital     Medication List          Accurate as of March 31, 2022  2:18 PM. If you have any questions, ask your nurse or doctor.             START taking these medications    ferrous sulfate 325 (65 Fe) MG tablet  Commonly known as: IRON 325  Take 1 tablet by mouth daily (with breakfast)  Started by: Dorie Corea MD        CHANGE how you take these medications    vitamin D 1.25 MG (01361 UT) Caps capsule  Commonly known as: ERGOCALCIFEROL  Take 1 capsule by mouth once a week  What changed: additional instructions        CONTINUE taking these medications    Adjust Fold Cane/York Handle Misc  Use daily for walking     * albuterol (2.5 MG/3ML) 0.083% nebulizer solution  Commonly known as: PROVENTIL  Take 3 mLs by nebulization every 6 hours as needed for Wheezing or Shortness of Breath     * albuterol sulfate  (90 Base) MCG/ACT inhaler  Commonly known as: ProAir HFA  Inhale 2 puffs into the lungs every 6 hours as needed for Wheezing or Shortness of Breath (cough)     allopurinol 300 MG tablet  Commonly known as: ZYLOPRIM  Take 1 tablet by mouth daily Per rheumatologist     amiodarone 200 MG tablet  Commonly known as: CORDARONE  Take 1 tablet by mouth daily     apixaban 2.5 MG Tabs tablet  Commonly known as: ELIQUIS  Take 1 tablet by mouth 2 times daily     atorvastatin 40 MG tablet  Commonly known as: LIPITOR  Take 1 tablet by mouth once daily     blood glucose monitor kit and supplies  1 kit by Other route three times daily Dispense Butterfly Elite CBG Device     Blood Pressure Kit  1 kit by Does not apply route three times daily     bumetanide 1 MG tablet  Commonly known as: BUMEX  Take 1 tablet by mouth 2 times daily     Compressor/Nebulizer Misc  Nebulizer with compressor. Disposable Med Nebs 2 /month. Reusable Med Nebs 1 per 6 months. Aerosol Mask 1 per month. Replacement Filters 2 per month. All other related supplies as needed per month. Medications being used: Albuterol . 083 unit dose vial. Frequency: every 6 hrs x 4/day . Length of Need: 1     desloratadine 5 MG tablet  Commonly known as: CLARINEX  Take 1 tablet by mouth once daily     FreeStyle Lancets Misc  1 each by Does not apply route daily Patient needs to contact office before any further refills will be approved     FREESTYLE LITE strip  Generic drug: blood glucose test strips  1 each by In Vitro route daily As needed. Handicap Placard Misc  by Does not apply route Expires on 12/30/25     Hibiclens 4 % external liquid  Generic drug: chlorhexidine  for decontamination AND WASH LEGS EVERY DAY     magnesium oxide 400 (241.3 Mg) MG Tabs tablet  Commonly known as: MAG-OX  Take 1 tablet by mouth twice daily     metoprolol tartrate 50 MG tablet  Commonly known as: LOPRESSOR  Take 1 tablet by mouth 2 times daily     miconazole 2 % powder  Commonly known as: MICOTIN  Apply topically 2 times daily UNDER THE SKIN FOLDS LONG TERM     therapeutic multivitamin-minerals tablet  Take 1 tablet by mouth daily     xeroform petrolatum patch 2\"X2\" Pads external pads  Apply 1 each topically daily         * This list has 2 medication(s) that are the same as other medications prescribed for you. Read the directions carefully, and ask your doctor or other care provider to review them with you.                Where to Get Your Medications      These medications were sent to [address] [place], [place] [address] [phone]  [address] [place] [address] [address], [address]    Phone: [phone]   · ferrous sulfate 325 (65 Fe) MG tablet Medications marked \"taking\" at this time  Outpatient Medications Marked as Taking for the 3/31/22 encounter (Office Visit) with Orquidea Elaine MD   Medication Sig Dispense Refill    ferrous sulfate (IRON 325) 325 (65 Fe) MG tablet Take 1 tablet by mouth daily (with breakfast) 180 tablet 1    amiodarone (CORDARONE) 200 MG tablet Take 1 tablet by mouth daily 30 tablet 0    apixaban (ELIQUIS) 2.5 MG TABS tablet Take 1 tablet by mouth 2 times daily 60 tablet 0    vitamin D (ERGOCALCIFEROL) 1.25 MG (20777 UT) CAPS capsule Take 1 capsule by mouth once a week (Patient taking differently: Take 50,000 Units by mouth once a week Sundays) 12 capsule 0    Nebulizers (COMPRESSOR/NEBULIZER) MISC Nebulizer with compressor. Disposable Med Nebs 2 /month. Reusable Med Nebs 1 per 6 months. Aerosol Mask 1 per month. Replacement Filters 2 per month. All other related supplies as needed per month. Medications being used: Albuterol . 083 unit dose vial. Frequency: every 6 hrs x 4/day . Length of Need: 1 1 each 3    albuterol (PROVENTIL) (2.5 MG/3ML) 0.083% nebulizer solution Take 3 mLs by nebulization every 6 hours as needed for Wheezing or Shortness of Breath 125 each 0    albuterol sulfate HFA (PROAIR HFA) 108 (90 Base) MCG/ACT inhaler Inhale 2 puffs into the lungs every 6 hours as needed for Wheezing or Shortness of Breath (cough) 8 g 3    Misc.  Devices (ADJUST FOLD CANE/YORK HANDLE) MISC Use daily for walking 1 each 0    Handicap Placard MISC by Does not apply route Expires on 12/30/25 1 each 0    allopurinol (ZYLOPRIM) 300 MG tablet Take 1 tablet by mouth daily Per rheumatologist 90 tablet 3    atorvastatin (LIPITOR) 40 MG tablet Take 1 tablet by mouth once daily 90 tablet 3    bumetanide (BUMEX) 1 MG tablet Take 1 tablet by mouth 2 times daily 180 tablet 3    chlorhexidine (HIBICLENS) 4 % external liquid for decontamination AND WASH LEGS EVERY  mL 2    desloratadine (CLARINEX) 5 MG tablet Take 1 tablet by mouth once daily 90 tablet 3    FreeStyle Lancets MISC 1 each by Does not apply route daily Patient needs to contact office before any further refills will be approved 90 each 3    magnesium oxide (MAG-OX) 400 (241.3 Mg) MG TABS tablet Take 1 tablet by mouth twice daily 180 tablet 3    metoprolol tartrate (LOPRESSOR) 50 MG tablet Take 1 tablet by mouth 2 times daily 180 tablet 3    miconazole (MICOTIN) 2 % powder Apply topically 2 times daily UNDER THE SKIN FOLDS LONG TERM 45 g 1    Multiple Vitamins-Minerals (THERAPEUTIC MULTIVITAMIN-MINERALS) tablet Take 1 tablet by mouth daily 90 tablet 3    Bismuth Tribromoph-Petrolatum (XEROFORM PETROLATUM PATCH 2\"X2\") PADS external pads Apply 1 each topically daily 30 each 2    blood glucose test strips (FREESTYLE LITE) strip 1 each by In Vitro route daily As needed. 100 each 3    Blood Pressure KIT 1 kit by Does not apply route three times daily 1 kit 0    blood glucose monitor kit and supplies 1 kit by Other route three times daily Dispense Butterfly Elite CBG Device 1 kit 0        Medications patient taking as of now reconciled against medications ordered at time of hospital discharge: Yes    Review of Systems   Constitutional: Positive for activity change and appetite change. Negative for fatigue, fever and unexpected weight change. HENT: Positive for congestion. Negative for ear pain, postnasal drip, rhinorrhea, sinus pressure, sore throat and trouble swallowing. Eyes: Positive for visual disturbance. Negative for redness. Respiratory: Positive for chest tightness, shortness of breath and wheezing. Negative for cough and stridor. Cardiovascular: Negative for chest pain, palpitations and leg swelling. Gastrointestinal: Positive for abdominal distention. Negative for abdominal pain, blood in stool, constipation, diarrhea, nausea, rectal pain and vomiting. Endocrine: Negative for polydipsia, polyphagia and polyuria.    Genitourinary: Negative for difficulty urinating, dysuria, flank pain, frequency and urgency. Musculoskeletal: Positive for arthralgias, back pain, gait problem, joint swelling and myalgias. Negative for neck pain. Skin: Positive for color change and rash. Negative for wound. Bilateral lower extremity skin changes   Allergic/Immunologic: Negative for food allergies and immunocompromised state. Neurological: Positive for weakness and numbness. Negative for dizziness, speech difficulty, light-headedness and headaches. Psychiatric/Behavioral: Positive for decreased concentration. Negative for agitation, behavioral problems, dysphoric mood, hallucinations, sleep disturbance and suicidal ideas. The patient is nervous/anxious. Objective:    /70   Pulse 96   Temp 97.1 °F (36.2 °C)   Ht 4' 11\" (1.499 m)   Wt 133 lb 6.4 oz (60.5 kg)   SpO2 97%   BMI 26.94 kg/m²   Physical Exam  Vitals and nursing note reviewed. Constitutional:       General: She is not in acute distress. Appearance: Normal appearance. She is well-developed. She is ill-appearing. She is not diaphoretic. HENT:      Head: Normocephalic and atraumatic. Nose: Nose normal.      Mouth/Throat:      Mouth: Mucous membranes are moist.   Eyes:      General:         Right eye: No discharge. Left eye: No discharge. Extraocular Movements: Extraocular movements intact. Conjunctiva/sclera: Conjunctivae normal.      Pupils: Pupils are equal, round, and reactive to light. Neck:      Thyroid: No thyromegaly. Cardiovascular:      Rate and Rhythm: Normal rate. Rhythm irregular. Heart sounds: Normal heart sounds. No murmur heard. Comments: A. fib  Pulmonary:      Effort: Pulmonary effort is normal. No respiratory distress. Breath sounds: Decreased air movement present. No transmitted upper airway sounds. Examination of the right-lower field reveals decreased breath sounds.  Examination of the left-lower field reveals decreased breath sounds. Decreased breath sounds present. No wheezing, rhonchi or rales. Abdominal:      General: Bowel sounds are normal. There is no distension. Palpations: Abdomen is soft. There is no mass. Tenderness: There is no abdominal tenderness. There is no right CVA tenderness, left CVA tenderness, guarding or rebound. Musculoskeletal:         General: No tenderness. Cervical back: Normal range of motion and neck supple. Spasms present. No rigidity. Thoracic back: No spasms. Normal range of motion. Lumbar back: Spasms present. Decreased range of motion. Right lower leg: Deformity present. 3+ Pitting Edema present. Left lower leg: Deformity present. 3+ Pitting Edema present. Lymphadenopathy:      Cervical: No cervical adenopathy. Skin:     General: Skin is dry. Coloration: Skin is not jaundiced or pale. Findings: Erythema and rash present. No bruising. Rash is crusting and scaling. Neurological:      General: No focal deficit present. Mental Status: She is alert and oriented to person, place, and time. Cranial Nerves: No cranial nerve deficit. Sensory: Sensory deficit present. Motor: Weakness present. No tremor. Coordination: Coordination normal.      Gait: Gait abnormal. Tandem walk normal.      Deep Tendon Reflexes: Reflexes are normal and symmetric. Comments: Patient is on wheelchair. Psychiatric:         Attention and Perception: Attention and perception normal. She is attentive. Mood and Affect: Mood is anxious. Mood is not depressed. Affect is not tearful. Speech: She is communicative. Speech is not rapid and pressured, delayed or slurred. Behavior: Behavior normal. Behavior is not agitated or slowed. Thought Content: Thought content normal.         Judgment: Judgment normal.         An electronic signature was used to authenticate this note.   --Gilberto Zuñiga MD

## 2022-03-31 NOTE — PROGRESS NOTES
Visit Information    Have you changed or started any medications since your last visit including any over-the-counter medicines, vitamins, or herbal medicines? no   Are you having any side effects from any of your medications? -  no  Have you stopped taking any of your medications? Is so, why? -  yes - hospital stopped plavix,cipro,colchicine,amlodipine    Have you seen any other physician or provider since your last visit? Yes - Records Obtained  Have you had any other diagnostic tests since your last visit? Yes - Records Obtained  Have you been seen in the emergency room and/or had an admission to a hospital since we last saw you? Yes - Records Obtained  Have you had your routine dental cleaning in the past 6 months? no    Have you activated your [employer] account? If not, what are your barriers?  Yes     Patient Care Team:  Sue Waddell MD as PCP - General (Family Medicine)  Sue Waddell MD as PCP - [address] [place] [address] [facility]  Jade Hernandez MD as Consulting Physician (Rheumatology)  Erin Donnelly MD as Surgeon (Cardiology)  Sana Minaya MD as Consulting Physician (Nephrology)  Apurva Melgar MD as Consulting Physician (Neurology)  Sana Minaya MD as Consulting Physician (Nephrology)  Xin Bermudez MD as Consulting Physician (Urology)  Yo Callejas MD as Surgeon (Ophthalmology)    Medical History Review  Past Medical, Family, and Social History reviewed and does contribute to the patient presenting condition    Health Maintenance   Topic Date Due    COVID-19 Vaccine (1) Never done    DTaP/Tdap/Td vaccine (1 - Tdap) Never done    Shingles Vaccine (1 of 2) Never done    Pneumococcal 65+ years Vaccine (1 of 1 - PPSV23) Never done    Diabetic retinal exam  10/19/2021    Diabetic microalbuminuria test  03/18/2022    Flu vaccine (1) 06/30/2022 (Originally 9/1/2021)    Breast cancer screen  08/28/2022    Diabetic foot exam  09/22/2022    A1C test (Diabetic or Prediabetic)  11/24/2022    Annual Wellness Visit (AWV)  11/25/2022    Lipid screen  12/06/2022    TSH testing  12/06/2022    Depression Screen  01/07/2023    Potassium monitoring  03/24/2023    Creatinine monitoring  03/24/2023    Colorectal Cancer Screen  03/15/2032    DEXA (modify frequency per FRAX score)  Completed    Hepatitis C screen  Completed    Hepatitis A vaccine  Aged Out    Hib vaccine  Aged Out    Meningococcal (ACWY) vaccine  Aged Out

## 2022-04-07 RX ORDER — AMIODARONE HYDROCHLORIDE 200 MG/1
TABLET ORAL
Qty: 30 TABLET | Refills: 0 | OUTPATIENT
Start: 2022-04-07

## 2022-04-08 ENCOUNTER — HOSPITAL ENCOUNTER (OUTPATIENT)
Age: 72
Setting detail: SPECIMEN
Discharge: HOME | End: 2022-04-08

## 2022-04-08 DIAGNOSIS — D50.9 IRON DEFICIENCY ANEMIA, UNSPECIFIED IRON DEFICIENCY ANEMIA TYPE: ICD-10-CM

## 2022-04-08 DIAGNOSIS — N17.9 ACUTE KIDNEY INJURY SUPERIMPOSED ON CKD (HCC): ICD-10-CM

## 2022-04-08 DIAGNOSIS — N18.31 TYPE 2 DIABETES MELLITUS WITH STAGE 3A CHRONIC KIDNEY DISEASE, WITHOUT LONG-TERM CURRENT USE OF INSULIN (HCC): ICD-10-CM

## 2022-04-08 DIAGNOSIS — N18.9 ACUTE KIDNEY INJURY SUPERIMPOSED ON CKD (HCC): ICD-10-CM

## 2022-04-08 DIAGNOSIS — D64.9 SEVERE ANEMIA: ICD-10-CM

## 2022-04-08 DIAGNOSIS — E11.22 TYPE 2 DIABETES MELLITUS WITH STAGE 3A CHRONIC KIDNEY DISEASE, WITHOUT LONG-TERM CURRENT USE OF INSULIN (HCC): ICD-10-CM

## 2022-04-08 LAB
ANION GAP SERPL CALCULATED.3IONS-SCNC: 17 MMOL/L (ref 9–17)
BUN BLDV-MCNC: 82 MG/DL (ref 8–23)
CALCIUM SERPL-MCNC: 9 MG/DL (ref 8.6–10.4)
CHLORIDE BLD-SCNC: 102 MMOL/L (ref 98–107)
CO2: 22 MMOL/L (ref 20–31)
CREAT SERPL-MCNC: 1.66 MG/DL (ref 0.5–0.9)
CREATININE URINE: 34.6 MG/DL (ref 28–217)
GFR AFRICAN AMERICAN: 37 ML/MIN
GFR NON-AFRICAN AMERICAN: 30 ML/MIN
GFR SERPL CREATININE-BSD FRML MDRD: ABNORMAL ML/MIN/{1.73_M2}
GLUCOSE BLD-MCNC: 150 MG/DL (ref 70–99)
HCT VFR BLD CALC: 34.5 % (ref 36.3–47.1)
HEMOGLOBIN: 10.5 G/DL (ref 11.9–15.1)
MICROALBUMIN/CREAT 24H UR: 3457 MG/L
MICROALBUMIN/CREAT UR-RTO: 9991 MCG/MG CREAT
POTASSIUM SERPL-SCNC: 4.2 MMOL/L (ref 3.7–5.3)
SODIUM BLD-SCNC: 141 MMOL/L (ref 135–144)

## 2022-04-11 DIAGNOSIS — I50.32 CHRONIC DIASTOLIC CHF (CONGESTIVE HEART FAILURE) (HCC): ICD-10-CM

## 2022-04-11 DIAGNOSIS — I50.43 ACUTE ON CHRONIC COMBINED SYSTOLIC (CONGESTIVE) AND DIASTOLIC (CONGESTIVE) HEART FAILURE (HCC): Primary | ICD-10-CM

## 2022-04-11 RX ORDER — AMIODARONE HYDROCHLORIDE 200 MG/1
200 TABLET ORAL DAILY
Qty: 90 TABLET | Refills: 2 | Status: SHIPPED | OUTPATIENT
Start: 2022-04-11 | End: 2022-04-15 | Stop reason: SDUPTHER

## 2022-04-11 RX ORDER — AMIODARONE HYDROCHLORIDE 200 MG/1
200 TABLET ORAL DAILY
Qty: 30 TABLET | Refills: 0 | OUTPATIENT
Start: 2022-04-11

## 2022-04-11 NOTE — TELEPHONE ENCOUNTER
Please Approve or Refuse.   Send to Pharmacy per Pt's Request:      Next Visit Date:  4/28/2022   Last Visit Date: 3/31/2022    Hemoglobin A1C (%)   Date Value   03/31/2022 4.6   11/24/2021 5.8   06/29/2021 5.7             ( goal A1C is < 7)   BP Readings from Last 3 Encounters:   03/31/22 130/70   03/17/22 137/63   03/15/22 (!) 98/49          (goal 120/80)  BUN   Date Value Ref Range Status   04/08/2022 82 (H) 8 - 23 mg/dL Final     CREATININE   Date Value Ref Range Status   04/08/2022 1.66 (H) 0.50 - 0.90 mg/dL Final     Potassium   Date Value Ref Range Status   04/08/2022 4.2 3.7 - 5.3 mmol/L Final

## 2022-04-11 NOTE — TELEPHONE ENCOUNTER
Please Approve or Refuse.   Send to Pharmacy per Pt's Request: Needs to go to [facility] please     Next Visit Date:  4/28/2022   Last Visit Date: 3/31/2022    Hemoglobin A1C (%)   Date Value   03/31/2022 4.6   11/24/2021 5.8   06/29/2021 5.7             ( goal A1C is < 7)   BP Readings from Last 3 Encounters:   03/31/22 130/70   03/17/22 137/63   03/15/22 (!) 98/49          (goal 120/80)  BUN   Date Value Ref Range Status   04/08/2022 82 (H) 8 - 23 mg/dL Final     CREATININE   Date Value Ref Range Status   04/08/2022 1.66 (H) 0.50 - 0.90 mg/dL Final     Potassium   Date Value Ref Range Status   04/08/2022 4.2 3.7 - 5.3 mmol/L Final

## 2022-04-13 DIAGNOSIS — I50.32 CHRONIC DIASTOLIC CHF (CONGESTIVE HEART FAILURE) (HCC): ICD-10-CM

## 2022-04-13 DIAGNOSIS — I87.2 VENOUS INSUFFICIENCY OF BOTH LOWER EXTREMITIES: Primary | ICD-10-CM

## 2022-04-13 DIAGNOSIS — S80.829A BLISTER OF LEG: ICD-10-CM

## 2022-04-13 RX ORDER — MUPIROCIN CALCIUM 20 MG/G
CREAM TOPICAL
Qty: 30 G | Refills: 5 | Status: SHIPPED | OUTPATIENT
Start: 2022-04-13 | End: 2022-04-28 | Stop reason: SDUPTHER

## 2022-04-13 RX ORDER — AMIODARONE HYDROCHLORIDE 200 MG/1
TABLET ORAL
Qty: 30 TABLET | Refills: 0 | OUTPATIENT
Start: 2022-04-13

## 2022-04-13 NOTE — RESULT ENCOUNTER NOTE
Please notify patient: Bactroban sent to the pharmacy , to Meds by mail Benjamín Miya  If any signs of infections to let us know promptly    She does have upcoming appointments with doctors, every time to ask to have her legs checked  In between to try to keep legs elevated, worse legs with warm water and soap and pat dry with towel or paper towel          Future Appointments  4/15/2022  12:45 PM   Emmy Frausto MD   [facility]        [facility]  4/20/2022  3:00 PM    Deisy Camarena MD          [address]  4/28/2022  2:00 PM    Flavio Polo [facility]     [facility]  4/29/2022  2:00 PM    Codey Leslie [facility]    [facility]  5/4/2022   3:30 PM    Flavio Polo MD     [facility]  11/25/2022 3:30 PM    Flavio Polo MD     [facility]

## 2022-04-15 ENCOUNTER — TELEPHONE (OUTPATIENT)
Dept: FAMILY MEDICINE CLINIC | Age: 72
End: 2022-04-15

## 2022-04-15 DIAGNOSIS — L03.90 WOUND CELLULITIS: Primary | ICD-10-CM

## 2022-04-15 RX ORDER — AMIODARONE HYDROCHLORIDE 200 MG/1
TABLET ORAL
Qty: 30 TABLET | Refills: 0 | OUTPATIENT
Start: 2022-04-15

## 2022-04-15 RX ORDER — AMIODARONE HYDROCHLORIDE 200 MG/1
200 TABLET ORAL DAILY
Qty: 90 TABLET | Refills: 0 | Status: ON HOLD | OUTPATIENT
Start: 2022-04-15 | End: 2022-07-11 | Stop reason: HOSPADM

## 2022-04-15 RX ORDER — MUPIROCIN CALCIUM 20 MG/G
CREAM TOPICAL
Qty: 30 G | Refills: 3 | Status: SHIPPED | OUTPATIENT
Start: 2022-04-15 | End: 2022-05-15

## 2022-04-15 RX ORDER — DOXYCYCLINE HYCLATE 100 MG
100 TABLET ORAL 2 TIMES DAILY
Qty: 14 TABLET | Refills: 0 | Status: SHIPPED | OUTPATIENT
Start: 2022-04-15 | End: 2022-04-22

## 2022-04-15 NOTE — TELEPHONE ENCOUNTER
Pt's daughter called and is concerned regarding mom, reports she has skin off on lower leg , she was changing dressing . Pt reports it looks worse, denies any fever chills. Pt has refused home health multiple times and still refusing. Will do prophylactic antibiotic and wound clinic out patient . Daughter advised if symptoms will get worse , go to ER. 1. Wound cellulitis    - [address]  - doxycycline hyclate (VIBRA-TABS) 100 MG tablet; Take 1 tablet by mouth 2 times daily for 7 days  Dispense: 14 tablet; Refill: 0      For staff ,PLEASE provide daughter information about wound clinic.

## 2022-04-15 NOTE — RESULT ENCOUNTER NOTE
Noted  New prescription sent to [facility]  Future Appointments  4/20/2022  3:00 PM    Kalee Helms MD          [address]  4/28/2022  2:00 PM    [facility]  4/29/2022  2:00 PM    Gloria Gould [facility]    [facility]  5/4/2022   3:30 PM    [facility]  11/25/2022 3:30 PM    [facility]

## 2022-04-18 ENCOUNTER — TELEPHONE (OUTPATIENT)
Dept: ONCOLOGY | Age: 72
End: 2022-04-18

## 2022-04-18 NOTE — TELEPHONE ENCOUNTER
Pt called to reschedule her 4/20 appt with Dr. Unique Miranda.      Pt was given multiple dates, pt decided on 5/25 @ 2:15     Electronically signed by Jasmin Adorno on 4/18/2022 at 9:48 AM

## 2022-04-20 ENCOUNTER — HOSPITAL ENCOUNTER (OUTPATIENT)
Dept: WOUND CARE | Age: 72
Discharge: HOME | End: 2022-04-20
Payer: MEDICARE

## 2022-04-20 VITALS
BODY MASS INDEX: 27.27 KG/M2 | SYSTOLIC BLOOD PRESSURE: 187 MMHG | RESPIRATION RATE: 18 BRPM | HEART RATE: 62 BPM | DIASTOLIC BLOOD PRESSURE: 69 MMHG | WEIGHT: 135 LBS | TEMPERATURE: 98.1 F

## 2022-04-20 DIAGNOSIS — L97.922 LEG ULCER, LEFT, WITH FAT LAYER EXPOSED (HCC): ICD-10-CM

## 2022-04-20 DIAGNOSIS — I87.2 CHRONIC VENOUS STASIS DERMATITIS OF BOTH LOWER EXTREMITIES: ICD-10-CM

## 2022-04-20 DIAGNOSIS — R60.0 BILATERAL LOWER EXTREMITY EDEMA: ICD-10-CM

## 2022-04-20 DIAGNOSIS — L97.912 LEG ULCER, RIGHT, WITH FAT LAYER EXPOSED (HCC): Primary | ICD-10-CM

## 2022-04-20 DIAGNOSIS — R09.89 DECREASED PEDAL PULSES: ICD-10-CM

## 2022-04-20 PROCEDURE — 97597 DBRDMT OPN WND 1ST 20 CM/<: CPT

## 2022-04-20 PROCEDURE — 97597 DBRDMT OPN WND 1ST 20 CM/<: CPT | Performed by: NURSE PRACTITIONER

## 2022-04-20 PROCEDURE — 87205 SMEAR GRAM STAIN: CPT

## 2022-04-20 PROCEDURE — 99203 OFFICE O/P NEW LOW 30 MIN: CPT | Performed by: NURSE PRACTITIONER

## 2022-04-20 PROCEDURE — 87070 CULTURE OTHR SPECIMN AEROBIC: CPT

## 2022-04-20 PROCEDURE — 11042 DBRDMT SUBQ TIS 1ST 20SQCM/<: CPT

## 2022-04-20 PROCEDURE — 11042 DBRDMT SUBQ TIS 1ST 20SQCM/<: CPT | Performed by: NURSE PRACTITIONER

## 2022-04-20 PROCEDURE — 87176 TISSUE HOMOGENIZATION CULTR: CPT

## 2022-04-20 PROCEDURE — 99214 OFFICE O/P EST MOD 30 MIN: CPT

## 2022-04-20 PROCEDURE — 86403 PARTICLE AGGLUT ANTBDY SCRN: CPT

## 2022-04-20 PROCEDURE — 11045 DBRDMT SUBQ TISS EACH ADDL: CPT | Performed by: NURSE PRACTITIONER

## 2022-04-20 PROCEDURE — 87075 CULTR BACTERIA EXCEPT BLOOD: CPT

## 2022-04-20 PROCEDURE — 11045 DBRDMT SUBQ TISS EACH ADDL: CPT

## 2022-04-20 RX ORDER — LIDOCAINE HYDROCHLORIDE 20 MG/ML
JELLY TOPICAL ONCE
Status: CANCELLED | OUTPATIENT
Start: 2022-04-20 | End: 2022-04-20

## 2022-04-20 RX ORDER — LIDOCAINE HYDROCHLORIDE 40 MG/ML
SOLUTION TOPICAL ONCE
Status: DISCONTINUED | OUTPATIENT
Start: 2022-04-20 | End: 2022-04-21 | Stop reason: HOSPADM

## 2022-04-20 RX ORDER — LIDOCAINE HYDROCHLORIDE 40 MG/ML
SOLUTION TOPICAL ONCE
Status: CANCELLED | OUTPATIENT
Start: 2022-04-20 | End: 2022-04-20

## 2022-04-20 ASSESSMENT — PAIN SCALES - GENERAL: PAINLEVEL_OUTOF10: 6

## 2022-04-20 NOTE — PLAN OF CARE
Problem: Chronic Conditions and Co-morbidities  Goal: Patient's chronic conditions and co-morbidity symptoms are monitored and maintained or improved  Outcome: Progressing     Problem: Discharge Planning  Goal: Discharge to home or other facility with appropriate resources  Outcome: Progressing     Problem: Pain  Goal: Verbalizes/displays adequate comfort level or baseline comfort level  Outcome: Progressing

## 2022-04-20 NOTE — PROGRESS NOTES
[address] [facility]:     [address] [facility] p: [phone] f: [phone]   PLEASE CONTACT Lovely Dubin [phone] FOR CONSENT  Ordering Center:   [address] [place] [address] [address] [address]  [phone]  FUF-[phone]    Patient Information:      Jeferson Manriquez [facility] [id number]   [phone]   : 1950  AGE: 66 y.o. GENDER: female   EPISODE DATE: 2022    Insurance:      PRIMARY INSURANCE:  Plan: MEDICARE PART B  Coverage: MEDICARE  Effective Date: 2015  Group Number: [unfilled]  Subscriber Number: [id number] - (Medicare)    Payor/Plan Subscr  Sex Relation Sub. Ins. ID Effective Group Num   1. [address] 1950 Female Self [id number] 1/1/15                                    [address] [address]   2. [address] [facility] [address] [address] [place] 1950 Female Self [id number] 1/1/15                                    [address] [address]       Patient Wound Information:      Problem List Items Addressed This Visit        Other    Bilateral lower extremity edema    Leg ulcer, right, with fat layer exposed ([address] - Primary    Leg ulcer, left, with fat layer exposed ([address]    Chronic venous stasis dermatitis of both lower extremities          WOUNDS REQUIRING DRESSING SUPPLIES:     Wound 22 Leg Left #2 left leg circumfrential (Active)   Wound Image   22 1348   Wound Etiology Venous 22 1348   Dressing Status New drainage noted; Old drainage noted 22 1348   Wound Cleansed Soap and water 22 1348   Wound Length (cm) 22 cm 22 1348   Wound Width (cm) 35.5 cm 22 1348   Wound Depth (cm) 0.1 cm 22 1348   Wound Surface Area (cm^2) 781 cm^2 22 1348   Wound Volume (cm^3) 78.1 cm^3 22 1348   Post-Procedure Length (cm) 22 cm 22 1348   Post-Procedure Width (cm) 35.5 cm 04/20/22 1348   Post-Procedure Depth (cm) 0.1 cm 04/20/22 1348   Post-Procedure Surface Area (cm^2) 781 cm^2 04/20/22 1348   Post-Procedure Volume (cm^3) 78.1 cm^3 04/20/22 1348   Wound Assessment Pink/red 04/20/22 1348   Drainage Amount Large 04/20/22 1348   Drainage Description Serosanguinous; Serous 04/20/22 1348   Odor None 04/20/22 1348   Marian-wound Assessment Blanchable erythema;Edematous 04/20/22 1348   Margins Undefined edges 04/20/22 1348   Wound Thickness Description not for Pressure Injury Full thickness 04/20/22 1348   Number of days: 0       Wound 04/20/22 Leg Right;Lateral;Distal #1 (Active)   Wound Image   04/20/22 1348   Wound Etiology Venous 04/20/22 1348   Dressing Status New drainage noted; Old drainage noted 04/20/22 1348   Wound Cleansed Soap and water 04/20/22 1348   Wound Length (cm) 5.5 cm 04/20/22 1348   Wound Width (cm) 4.6 cm 04/20/22 1348   Wound Depth (cm) 0.1 cm 04/20/22 1348   Wound Surface Area (cm^2) 25.3 cm^2 04/20/22 1348   Wound Volume (cm^3) 2.53 cm^3 04/20/22 1348   Post-Procedure Length (cm) 5.5 cm 04/20/22 1348   Post-Procedure Width (cm) 4.6 cm 04/20/22 1348   Post-Procedure Depth (cm) 0.1 cm 04/20/22 1348   Post-Procedure Surface Area (cm^2) 25.3 cm^2 04/20/22 1348   Post-Procedure Volume (cm^3) 2.53 cm^3 04/20/22 1348   Wound Assessment [place]/red;Slough 04/20/22 1348   Drainage Amount Large 04/20/22 1348   Drainage Description Serosanguinous; Serous 04/20/22 1348   Odor None 04/20/22 1348   Marian-wound Assessment Maceration;Blanchable erythema 04/20/22 1348   Margins Defined edges 04/20/22 1348   Wound Thickness Description not for Pressure Injury Full thickness 04/20/22 1348   Number of days: 0          Supplies Requested :      WOUND #: 1 and 2   PRIMARY DRESSING:  Alginate with silver pad Silver sara nonadherent then Kerramax due to amount of drainage.    high risk of infection     FREQUENCY OF DRESSING CHANGES:  Daily due to cellulitic changes and increased         ADDITIONAL ITEMS:  [] Gloves Small  [x] Gloves Medium [] Gloves Large [] Gloves Jessica Pare  [] Tape 1\" [x] Tape 2\" [] Tape 3\"  [] Medipore Tape  [] Saline  [] Skin Prep   [] Adhesive Remover   [] Cotton Tip Applicators   [] Other:    Patient Wound(s) Debrided: [x] Yes if yes please add date 4/20/22  [] No    Debribement Type: Excisional/Sharp- on the right leg                                     Open debridement to left leg    Is the patient currently on an antibiotic for their Wound(s): [x] Yes if yes please add name and dose doxycycline  [] No    Weekly wound care visits until determined otherwise.      Patient currently being seen by Home Health: [] Yes   [x] No    Duration for needed supplies:  []15  [x]30  []60  []90 Days    Electronically signed by Kristie Velasquez RN on 4/20/2022 at 2:17 PM     Provider Information:      PROVIDER'S NAME: Amalia RESENDEZ-ROCÍO    TNR-[id number]

## 2022-04-20 NOTE — PROGRESS NOTES
RN instructed patient and family regarding high blood pressure. Both patient and family verbalized understanding of signs and symptoms of high BP. Patient instructed to go to ER if signs/symptoms become present. Patient instructed to check BP at home this afternoon with patient's home BP cuff. Instructed patient and family to take patient to E.R. if home BP cuff reveals BP to be high, or if symptoms of high BP become present.

## 2022-04-20 NOTE — PROGRESS NOTES
[address]   Progress Note and Procedure Note      Akira Ojeda  MEDICAL RECORD NUMBER:  [id number]  AGE: 66 y.o. GENDER: female  : 1950  EPISODE DATE:  2022    Subjective:     Chief Complaint   Patient presents with    Wound Check     bilateral lower legs         HISTORY of PRESENT ILLNESS HPI     Pollo Hannah is a 66 y.o. female who presents today for wound/ulcer evaluation. History of Wound Context: presents to wound clinic today for evaluation of bilateral lower leg ulcerations. Has had wounds on and off for years. Legs do get edematous at times. Is currently taking doxycycline per PCP for cellulitis which is improving per patient. She is here with her daughter who is able to help with dressing changes at home.    Wound/Ulcer Pain Timing/Severity: intermittent  Quality of pain: aching  Severity:  2 / 10   Modifying Factors: Pain worsens with debridement  Associated Signs/Symptoms: edema and drainage    Ulcer Identification:  Ulcer Type: venous  Contributing Factors: edema, venous stasis and decreased mobility    Wound: N/A        PAST MEDICAL HISTORY        Diagnosis Date    Acute CVA (cerebrovascular accident) ([address] 2020    Altered mental status 2021    Arthritis     Brain mass     Cellulitis and abscess     Cellulitis and abscess of unspecified site     Cellulitis of both lower extremities 2021    Cellulitis of left lower extremity 2020    Cellulitis of lower extremity 10/4/2020    CHF (congestive heart failure) (HCC)     Chronic kidney disease, unspecified     Coronary atherosclerosis of native coronary artery     Essential hypertension 2020    Essential hypertension, malignant     Hallucinations 2021    Hard of hearing     Head contusion 2020    Hypokalemia 2020    Iron deficiency anemia, unspecified     Meningioma ([address] 2021    Myocardial infarction ([address]     Other and unspecified hyperlipidemia     Pure hypercholesterolemia     Toxic metabolic encephalopathy     Type II or unspecified type diabetes mellitus without mention of complication, not stated as uncontrolled     Unspecified asthma(493.90)     Unspecified venous (peripheral) insufficiency     Unspecified vitamin D deficiency     UTI (urinary tract infection) 2021       PAST SURGICAL HISTORY    Past Surgical History:   Procedure Laterality Date    COLONOSCOPY N/A 3/15/2022    COLONOSCOPY DIAGNOSTIC performed by Dang Law MD at [facility].      x 3, Dr. Violet Harding  3/7/2022         THORACENTESIS  3/10/2022         TONSILLECTOMY AND ADENOIDECTOMY      UPPER GASTROINTESTINAL ENDOSCOPY N/A 3/15/2022    EGD BIOPSY performed by Dang Law MD at [address] [place] [address]    Family History   Problem Relation Age of Onset    Diabetes Mother     Heart Disease Mother     Heart Disease Father     Diabetes Sister     High Blood Pressure Sister     Diabetes Maternal Grandmother        SOCIAL HISTORY    Social History     Tobacco Use    Smoking status: Former Smoker     Packs/day: 0.25     Years: 10.00     Pack years: 2.50     Quit date: 2000     Years since quittin.3    Smokeless tobacco: Never Used   Vaping Use    Vaping Use: Never used   Substance Use Topics    Alcohol use: Not Currently     Alcohol/week: 0.0 standard drinks    Drug use: No       ALLERGIES    Allergies   Allergen Reactions    Latex Rash    Aleve [Naproxen Sodium]      Chronic kidney disease stage III, CHF    Pioglitazone Other (See Comments)     Congestive heart failure    Claritin [Loratadine]     Keflex [Cephalexin]     Lisinopril      Needs clarification of contraindication       MEDICATIONS    Current Outpatient Medications on File Prior to Encounter   Medication Sig Dispense Refill    amiodarone (CORDARONE) 200 MG tablet Take 1 tablet by mouth daily 90 tablet 0    mupirocin (BACTROBAN) 2 % cream Apply 3 times daily for 10 days for open blisters on the legs to prevent cellulitis 30 g 3    doxycycline hyclate (VIBRA-TABS) 100 MG tablet Take 1 tablet by mouth 2 times daily for 7 days 14 tablet 0    mupirocin (BACTROBAN) 2 % cream Apply 3 times daily for 10 days for open blisters on the legs 30 g 5    ferrous sulfate (IRON 325) 325 (65 Fe) MG tablet Take 1 tablet by mouth daily (with breakfast) 180 tablet 1    apixaban (ELIQUIS) 2.5 MG TABS tablet Take 1 tablet by mouth 2 times daily 60 tablet 0    vitamin D (ERGOCALCIFEROL) 1.25 MG (74889 UT) CAPS capsule Take 1 capsule by mouth once a week (Patient taking differently: Take 50,000 Units by mouth once a week Sundays) 12 capsule 0    Nebulizers (COMPRESSOR/NEBULIZER) MISC Nebulizer with compressor. Disposable Med Nebs 2 /month. Reusable Med Nebs 1 per 6 months. Aerosol Mask 1 per month. Replacement Filters 2 per month. All other related supplies as needed per month. Medications being used: Albuterol . 083 unit dose vial. Frequency: every 6 hrs x 4/day . Length of Need: 1 1 each 3    albuterol (PROVENTIL) (2.5 MG/3ML) 0.083% nebulizer solution Take 3 mLs by nebulization every 6 hours as needed for Wheezing or Shortness of Breath 125 each 0    albuterol sulfate HFA (PROAIR HFA) 108 (90 Base) MCG/ACT inhaler Inhale 2 puffs into the lungs every 6 hours as needed for Wheezing or Shortness of Breath (cough) 8 g 3    Misc.  Devices (ADJUST FOLD CANE/YORK HANDLE) MISC Use daily for walking 1 each 0    Handicap Placard MISC by Does not apply route Expires on 12/30/25 1 each 0    allopurinol (ZYLOPRIM) 300 MG tablet Take 1 tablet by mouth daily Per rheumatologist 90 tablet 3    atorvastatin (LIPITOR) 40 MG tablet Take 1 tablet by mouth once daily 90 tablet 3    bumetanide (BUMEX) 1 MG tablet Take 1 tablet by mouth 2 times daily 180 tablet 3    chlorhexidine (HIBICLENS) 4 % external liquid for decontamination AND [address] LEGS EVERY DAY 946 mL 2    desloratadine (CLARINEX) 5 MG tablet Take 1 tablet by mouth once daily 90 tablet 3    FreeStyle Lancets MISC 1 each by Does not apply route daily Patient needs to contact office before any further refills will be approved 90 each 3    magnesium oxide (MAG-OX) 400 (241.3 Mg) MG TABS tablet Take 1 tablet by mouth twice daily 180 tablet 3    metoprolol tartrate (LOPRESSOR) 50 MG tablet Take 1 tablet by mouth 2 times daily 180 tablet 3    miconazole (MICOTIN) 2 % powder Apply topically 2 times daily UNDER THE SKIN FOLDS LONG TERM 45 g 1    Multiple Vitamins-Minerals (THERAPEUTIC MULTIVITAMIN-MINERALS) tablet Take 1 tablet by mouth daily 90 tablet 3    Bismuth Tribromoph-Petrolatum (XEROFORM PETROLATUM PATCH 2\"X2\") PADS external pads Apply 1 each topically daily 30 each 2    blood glucose test strips (FREESTYLE LITE) strip 1 each by In Vitro route daily As needed. 100 each 3    Blood Pressure KIT 1 kit by Does not apply route three times daily 1 kit 0    blood glucose monitor kit and supplies 1 kit by Other route three times daily Dispense Butterfly Elite CBG Device 1 kit 0     No current facility-administered medications on file prior to encounter.        REVIEW OF SYSTEMS    Constitutional: negative  Eyes: negative  Ears, nose, mouth, throat, and face: negative  Respiratory: negative  Cardiovascular: negative except for lower extremity edema  Gastrointestinal: negative  Genitourinary:negative  Integument/breast: negative except for bilateral lower leg wounds  Hematologic/lymphatic: negative  Musculoskeletal:negative  Neurological: negative  Behavioral/Psych: negative  Endocrine: negative  Allergic/Immunologic: negative    Objective:      BP (!) 192/64   Pulse 62   Temp 98.1 °F (36.7 °C) (Tympanic)   Resp 18   Wt 135 lb (61.2 kg)   BMI 27.27 kg/m²     Wt Readings from Last 3 Encounters:   04/20/22 135 lb (61.2 kg)   04/15/22 135 lb 3.2 oz (61.3 kg)   03/31/22 133 lb 6.4 oz (60.5 kg) PHYSICAL EXAM    General Appearance: alert and oriented to person, place and time, well developed and well- nourished, in no acute distress  Skin: warm and dry, no rash or erythema, bilateral lower leg ulcerations   Head: normocephalic and atraumatic  Eyes: pupils equal, round, extraocular eye movements intact, and conjunctivae normal  Pulmonary/Chest: clear to auscultation bilaterally- no wheezes, rales or rhonchi, normal air movement, no respiratory distress  Cardiovascular: normal rate, regular rhythm, normal S1 and S2, no murmurs  Abdomen: soft, non-tender, non-distended, normal bowel sounds  Extremities: no cyanosis, clubbing   Musculoskeletal: no joint swelling, deformity or tenderness  Neurologic: gait, coordination and speech normal      Assessment:     Problem List Items Addressed This Visit     Bilateral lower extremity edema    Relevant Orders    VL ARTERIAL PVR LOWER WO EXERCISE    Chronic venous stasis dermatitis of both lower extremities    Relevant Orders    Reflux study/Reflux Venous Insufficiency Bilateral    Leg ulcer, left, with fat layer exposed (HCC)    Leg ulcer, right, with fat layer exposed ([address] - Primary           Procedure Note  Indications:  Based on my examination of this patient's wound(s)/ulcer(s) today, debridement is required to promote healing and evaluate the wound base. Performed by: Mickael Cowden, APRN - CNP    Consent obtained:  Yes    Time out taken:  Yes    Pain Control: Anesthetic  Anesthetic: 4% Lidocaine Liquid Topical     Debridement:Excisional Debridement to right leg and open debridement to left leg     Using curette and forceps the wound(s)/ulcer(s) was/were sharply debrided down through and including the removal of dermis to left leg and subcutaneous tissue to right leg.         Devitalized Tissue Debrided:  fibrin, biofilm and slough    Pre Debridement Measurements:  Are located in the Wound/Ulcer Documentation Flow Sheet    Wound/Ulcer #: 1 and 2    Post Debridement Measurements:  Wound/Ulcer Descriptions are Pre Debridement except measurements:    Wound 04/20/22 Leg Left #2 left leg circumfrential (Active)   Wound Image   04/20/22 1348   Wound Etiology Venous 04/20/22 1348   Dressing Status New drainage noted; Old drainage noted 04/20/22 1348   Wound Cleansed Soap and water 04/20/22 1348   Wound Length (cm) 22 cm 04/20/22 1348   Wound Width (cm) 35.5 cm 04/20/22 1348   Wound Depth (cm) 0.1 cm 04/20/22 1348   Wound Surface Area (cm^2) 781 cm^2 04/20/22 1348   Wound Volume (cm^3) 78.1 cm^3 04/20/22 1348   Post-Procedure Length (cm) 22 cm 04/20/22 1348   Post-Procedure Width (cm) 35.5 cm 04/20/22 1348   Post-Procedure Depth (cm) 0.1 cm 04/20/22 1348   Post-Procedure Surface Area (cm^2) 781 cm^2 04/20/22 1348   Post-Procedure Volume (cm^3) 78.1 cm^3 04/20/22 1348   Wound Assessment Pink/red 04/20/22 1348   Drainage Amount Large 04/20/22 1348   Drainage Description Serosanguinous; Serous 04/20/22 1348   Odor None 04/20/22 1348   Marian-wound Assessment Blanchable erythema;Edematous 04/20/22 1348   Margins Undefined edges 04/20/22 1348   Wound Thickness Description not for Pressure Injury Full thickness 04/20/22 1348   Number of days: 0       Wound 04/20/22 Leg Right;Lateral;Distal #1 (Active)   Wound Image   04/20/22 1348   Wound Etiology Venous 04/20/22 1348   Dressing Status New drainage noted; Old drainage noted 04/20/22 1348   Wound Cleansed Soap and water 04/20/22 1348   Wound Length (cm) 5.5 cm 04/20/22 1348   Wound Width (cm) 4.6 cm 04/20/22 1348   Wound Depth (cm) 0.1 cm 04/20/22 1348   Wound Surface Area (cm^2) 25.3 cm^2 04/20/22 1348   Wound Volume (cm^3) 2.53 cm^3 04/20/22 1348   Post-Procedure Length (cm) 5.5 cm 04/20/22 1348   Post-Procedure Width (cm) 4.6 cm 04/20/22 1348   Post-Procedure Depth (cm) 0.1 cm 04/20/22 1348   Post-Procedure Surface Area (cm^2) 25.3 cm^2 04/20/22 1348   Post-Procedure Volume (cm^3) 2.53 cm^3 04/20/22 1348   Wound Assessment Pink/red;Slough 04/20/22 1348   Drainage Amount Large 04/20/22 1348   Drainage Description Serosanguinous; Serous 04/20/22 1348   Odor None 04/20/22 1348   Marian-wound Assessment Maceration;Blanchable erythema 04/20/22 1348   Margins Defined edges 04/20/22 1348   Wound Thickness Description not for Pressure Injury Full thickness 04/20/22 1348   Number of days: 0          Percent of Wound(s)/Ulcer(s) Debrided: 100% of right leg wound (#1) and 1% of left leg wound (#2)     Total Surface Area Debrided:  25.3 sq cm of subcutaneous tissue to right leg wound and 7.81 sq cm of dermis to left leg wound     Estimated Blood Loss:  Minimal    Hemostasis Achieved:  by pressure    Procedural Pain:  2  / 10     Post Procedural Pain:  0 / 10     Response to treatment:  Well tolerated by patient. Plan:     Treatment Note please see Discharge Instructions    Written patient dismissal instructions given to patient and signed by patient or POA.          Electronically signed by MAL Sexton Ma, CNP on 4/20/2022 at 2:26 PM

## 2022-04-25 LAB
CULTURE: ABNORMAL
DIRECT EXAM: ABNORMAL
DIRECT EXAM: ABNORMAL
SPECIMEN DESCRIPTION: ABNORMAL

## 2022-04-26 DIAGNOSIS — E55.9 VITAMIN D DEFICIENCY: ICD-10-CM

## 2022-04-27 RX ORDER — ERGOCALCIFEROL 1.25 MG/1
50000 CAPSULE ORAL WEEKLY
Qty: 12 CAPSULE | Refills: 0 | Status: SHIPPED | OUTPATIENT
Start: 2022-04-27 | End: 2022-04-28 | Stop reason: SDUPTHER

## 2022-04-28 ENCOUNTER — HOSPITAL ENCOUNTER (OUTPATIENT)
Dept: WOUND CARE | Age: 72
Discharge: HOME | End: 2022-04-28
Payer: MEDICARE

## 2022-04-28 ENCOUNTER — HOSPITAL ENCOUNTER (OUTPATIENT)
Age: 72
Setting detail: SPECIMEN
Discharge: HOME | End: 2022-04-28

## 2022-04-28 ENCOUNTER — OFFICE VISIT (OUTPATIENT)
Dept: FAMILY MEDICINE CLINIC | Age: 72
End: 2022-04-28
Payer: MEDICARE

## 2022-04-28 VITALS
OXYGEN SATURATION: 99 % | SYSTOLIC BLOOD PRESSURE: 140 MMHG | HEIGHT: 59 IN | BODY MASS INDEX: 26.81 KG/M2 | HEART RATE: 72 BPM | WEIGHT: 133 LBS | TEMPERATURE: 97.1 F | DIASTOLIC BLOOD PRESSURE: 80 MMHG

## 2022-04-28 VITALS
BODY MASS INDEX: 27.27 KG/M2 | DIASTOLIC BLOOD PRESSURE: 67 MMHG | HEART RATE: 56 BPM | RESPIRATION RATE: 18 BRPM | SYSTOLIC BLOOD PRESSURE: 183 MMHG | TEMPERATURE: 97 F | WEIGHT: 135 LBS

## 2022-04-28 DIAGNOSIS — E11.22 TYPE 2 DIABETES MELLITUS WITH STAGE 3A CHRONIC KIDNEY DISEASE, WITHOUT LONG-TERM CURRENT USE OF INSULIN (HCC): ICD-10-CM

## 2022-04-28 DIAGNOSIS — R60.0 BILATERAL LOWER EXTREMITY EDEMA: Primary | ICD-10-CM

## 2022-04-28 DIAGNOSIS — Z12.31 ENCOUNTER FOR SCREENING MAMMOGRAM FOR BREAST CANCER: ICD-10-CM

## 2022-04-28 DIAGNOSIS — I87.2 CHRONIC VENOUS STASIS DERMATITIS OF BOTH LOWER EXTREMITIES: ICD-10-CM

## 2022-04-28 DIAGNOSIS — L97.912 LEG ULCER, RIGHT, WITH FAT LAYER EXPOSED (HCC): ICD-10-CM

## 2022-04-28 DIAGNOSIS — E55.9 VITAMIN D DEFICIENCY: ICD-10-CM

## 2022-04-28 DIAGNOSIS — R63.4 UNINTENDED WEIGHT LOSS: ICD-10-CM

## 2022-04-28 DIAGNOSIS — I50.32 CHRONIC DIASTOLIC CHF (CONGESTIVE HEART FAILURE) (HCC): Primary | ICD-10-CM

## 2022-04-28 DIAGNOSIS — L97.922 LEG ULCER, LEFT, WITH FAT LAYER EXPOSED (HCC): ICD-10-CM

## 2022-04-28 DIAGNOSIS — I48.0 PAROXYSMAL ATRIAL FIBRILLATION (HCC): ICD-10-CM

## 2022-04-28 DIAGNOSIS — R04.0 BLEEDING NOSE: ICD-10-CM

## 2022-04-28 DIAGNOSIS — I12.9 BENIGN HYPERTENSION WITH CKD (CHRONIC KIDNEY DISEASE) STAGE III (HCC): ICD-10-CM

## 2022-04-28 DIAGNOSIS — N18.31 TYPE 2 DIABETES MELLITUS WITH STAGE 3A CHRONIC KIDNEY DISEASE, WITHOUT LONG-TERM CURRENT USE OF INSULIN (HCC): ICD-10-CM

## 2022-04-28 DIAGNOSIS — Z23 ENCOUNTER FOR IMMUNIZATION: ICD-10-CM

## 2022-04-28 DIAGNOSIS — N18.30 BENIGN HYPERTENSION WITH CKD (CHRONIC KIDNEY DISEASE) STAGE III (HCC): ICD-10-CM

## 2022-04-28 DIAGNOSIS — Z78.0 POSTMENOPAUSAL: ICD-10-CM

## 2022-04-28 LAB
ABSOLUTE EOS #: 0.1 K/UL (ref 0–0.4)
ABSOLUTE LYMPH #: 1.4 K/UL (ref 1–4.8)
ABSOLUTE MONO #: 0.6 K/UL (ref 0.1–1.3)
ALBUMIN SERPL-MCNC: 3.1 G/DL (ref 3.5–5.2)
ALP BLD-CCNC: 164 U/L (ref 35–104)
ALT SERPL-CCNC: 12 U/L (ref 5–33)
ANION GAP SERPL CALCULATED.3IONS-SCNC: 13 MMOL/L (ref 9–17)
AST SERPL-CCNC: 23 U/L
BASOPHILS # BLD: 1 % (ref 0–2)
BASOPHILS ABSOLUTE: 0 K/UL (ref 0–0.2)
BILIRUB SERPL-MCNC: 0.42 MG/DL (ref 0.3–1.2)
BUN BLDV-MCNC: 83 MG/DL (ref 8–23)
CALCIUM SERPL-MCNC: 9.1 MG/DL (ref 8.6–10.4)
CHLORIDE BLD-SCNC: 101 MMOL/L (ref 98–107)
CO2: 24 MMOL/L (ref 20–31)
CREAT SERPL-MCNC: 1.44 MG/DL (ref 0.5–0.9)
EOSINOPHILS RELATIVE PERCENT: 2 % (ref 0–4)
GFR AFRICAN AMERICAN: 43 ML/MIN
GFR NON-AFRICAN AMERICAN: 36 ML/MIN
GFR SERPL CREATININE-BSD FRML MDRD: ABNORMAL ML/MIN/{1.73_M2}
GLUCOSE BLD-MCNC: 108 MG/DL (ref 70–99)
HCT VFR BLD CALC: 38 % (ref 36–46)
HEMOGLOBIN: 12.1 G/DL (ref 12–16)
LYMPHOCYTES # BLD: 21 % (ref 24–44)
MAGNESIUM: 2.2 MG/DL (ref 1.6–2.6)
MCH RBC QN AUTO: 30.5 PG (ref 26–34)
MCHC RBC AUTO-ENTMCNC: 31.9 G/DL (ref 31–37)
MCV RBC AUTO: 95.5 FL (ref 80–100)
MONOCYTES # BLD: 9 % (ref 1–7)
PDW BLD-RTO: 18.6 % (ref 11.5–14.9)
PHOSPHORUS: 4.5 MG/DL (ref 2.6–4.5)
PLATELET # BLD: 235 K/UL (ref 150–450)
PMV BLD AUTO: 7.3 FL (ref 6–12)
POTASSIUM SERPL-SCNC: 4.2 MMOL/L (ref 3.7–5.3)
PRO-BNP: 4498 PG/ML
RBC # BLD: 3.98 M/UL (ref 4–5.2)
SEG NEUTROPHILS: 67 % (ref 36–66)
SEGMENTED NEUTROPHILS ABSOLUTE COUNT: 4.7 K/UL (ref 1.3–9.1)
SODIUM BLD-SCNC: 138 MMOL/L (ref 135–144)
TOTAL PROTEIN: 6.1 G/DL (ref 6.4–8.3)
URIC ACID: 2.8 MG/DL (ref 2.4–5.7)
WBC # BLD: 7 K/UL (ref 3.5–11)

## 2022-04-28 PROCEDURE — 3017F COLORECTAL CA SCREEN DOC REV: CPT | Performed by: FAMILY MEDICINE

## 2022-04-28 PROCEDURE — 1036F TOBACCO NON-USER: CPT | Performed by: FAMILY MEDICINE

## 2022-04-28 PROCEDURE — 85025 COMPLETE CBC W/AUTO DIFF WBC: CPT

## 2022-04-28 PROCEDURE — G8400 PT W/DXA NO RESULTS DOC: HCPCS | Performed by: FAMILY MEDICINE

## 2022-04-28 PROCEDURE — 83735 ASSAY OF MAGNESIUM: CPT

## 2022-04-28 PROCEDURE — 80053 COMPREHEN METABOLIC PANEL: CPT

## 2022-04-28 PROCEDURE — 4040F PNEUMOC VAC/ADMIN/RCVD: CPT | Performed by: FAMILY MEDICINE

## 2022-04-28 PROCEDURE — 11042 DBRDMT SUBQ TIS 1ST 20SQCM/<: CPT

## 2022-04-28 PROCEDURE — 84550 ASSAY OF BLOOD/URIC ACID: CPT

## 2022-04-28 PROCEDURE — 83880 ASSAY OF NATRIURETIC PEPTIDE: CPT

## 2022-04-28 PROCEDURE — G8427 DOCREV CUR MEDS BY ELIG CLIN: HCPCS | Performed by: FAMILY MEDICINE

## 2022-04-28 PROCEDURE — 3044F HG A1C LEVEL LT 7.0%: CPT | Performed by: FAMILY MEDICINE

## 2022-04-28 PROCEDURE — 99214 OFFICE O/P EST MOD 30 MIN: CPT | Performed by: FAMILY MEDICINE

## 2022-04-28 PROCEDURE — 84100 ASSAY OF PHOSPHORUS: CPT

## 2022-04-28 PROCEDURE — 2022F DILAT RTA XM EVC RTNOPTHY: CPT | Performed by: FAMILY MEDICINE

## 2022-04-28 PROCEDURE — G8417 CALC BMI ABV UP PARAM F/U: HCPCS | Performed by: FAMILY MEDICINE

## 2022-04-28 PROCEDURE — 1123F ACP DISCUSS/DSCN MKR DOCD: CPT | Performed by: FAMILY MEDICINE

## 2022-04-28 PROCEDURE — 36415 COLL VENOUS BLD VENIPUNCTURE: CPT

## 2022-04-28 PROCEDURE — 1090F PRES/ABSN URINE INCON ASSESS: CPT | Performed by: FAMILY MEDICINE

## 2022-04-28 RX ORDER — SODIUM CHLORIDE/ALOE VERA
GEL (GRAM) NASAL PRN
Qty: 14.1 G | Refills: 3 | Status: SHIPPED | OUTPATIENT
Start: 2022-04-28 | End: 2022-06-17 | Stop reason: ALTCHOICE

## 2022-04-28 RX ORDER — LIDOCAINE HYDROCHLORIDE 40 MG/ML
SOLUTION TOPICAL ONCE
Status: COMPLETED | OUTPATIENT
Start: 2022-04-28 | End: 2022-04-28

## 2022-04-28 RX ORDER — LIDOCAINE HYDROCHLORIDE 20 MG/ML
JELLY TOPICAL ONCE
Status: CANCELLED | OUTPATIENT
Start: 2022-04-28 | End: 2022-04-28

## 2022-04-28 RX ORDER — LIDOCAINE HYDROCHLORIDE 40 MG/ML
SOLUTION TOPICAL ONCE
Status: CANCELLED | OUTPATIENT
Start: 2022-04-28 | End: 2022-04-28

## 2022-04-28 RX ORDER — SODIUM CHLORIDE/ALOE VERA
GEL (GRAM) NASAL PRN
Qty: 14.5 G | Refills: 3 | Status: SHIPPED | OUTPATIENT
Start: 2022-04-28 | End: 2022-05-25 | Stop reason: SDUPTHER

## 2022-04-28 RX ORDER — ERGOCALCIFEROL 1.25 MG/1
50000 CAPSULE ORAL WEEKLY
Qty: 12 CAPSULE | Refills: 0 | Status: SHIPPED | OUTPATIENT
Start: 2022-04-28

## 2022-04-28 RX ORDER — BNT162B2 0.23 MG/2.25ML
0.3 INJECTION, SUSPENSION INTRAMUSCULAR ONCE
Qty: 0.3 ML | Refills: 0 | Status: SHIPPED | OUTPATIENT
Start: 2022-04-28 | End: 2022-04-28

## 2022-04-28 RX ADMIN — LIDOCAINE HYDROCHLORIDE 5 ML: 40 SOLUTION TOPICAL at 13:16

## 2022-04-28 ASSESSMENT — PAIN SCALES - GENERAL: PAINLEVEL_OUTOF10: 4

## 2022-04-28 ASSESSMENT — ENCOUNTER SYMPTOMS
NAUSEA: 0
CHEST TIGHTNESS: 0
VOMITING: 0
COUGH: 0
CONSTIPATION: 0
ABDOMINAL PAIN: 0
WHEEZING: 0
DIARRHEA: 0
ABDOMINAL DISTENTION: 1

## 2022-04-28 NOTE — PROGRESS NOTES
Visit Information    Have you changed or started any medications since your last visit including any over-the-counter medicines, vitamins, or herbal medicines? no   Are you having any side effects from any of your medications? -  no  Have you stopped taking any of your medications? Is so, why? -  no    Have you seen any other physician or provider since your last visit? Yes - Records Obtained  Have you had any other diagnostic tests since your last visit? Yes - Records Obtained  Have you been seen in the emergency room and/or had an admission to a hospital since we last saw you? No  Have you had your routine dental cleaning in the past 6 months? no    Have you activated your [employer] account? If not, what are your barriers?  Yes     Patient Care Team:  Annalee Granda MD as PCP - General (Family Medicine)  Annalee Granda MD as PCP - [facility]  Alisha Bryan MD as Consulting Physician (Rheumatology)  Noemi Miller MD as Surgeon (Cardiology)  [facility], MD as Consulting Physician (Nephrology)  Zuri Aguayo MD as Consulting Physician (Neurology)  [facility], MD as Consulting Physician (Nephrology)  Tiffanie Salmon MD as Consulting Physician (Urology)  Lamine Lantigua MD as Surgeon (Ophthalmology)  Juan Krause MD as Consulting Physician (Gastroenterology)    Medical History Review  Past Medical, Family, and Social History reviewed and does contribute to the patient presenting condition    Health Maintenance   Topic Date Due    COVID-19 Vaccine (1) Never done    Pneumococcal 65+ years Vaccine (1 - PCV) Never done    DTaP/Tdap/Td vaccine (1 - Tdap) Never done    Shingles Vaccine (1 of 2) Never done    Diabetic retinal exam  10/19/2021    Breast cancer screen  08/28/2022    Flu vaccine (Season Ended) 09/01/2022    Diabetic foot exam  09/22/2022    Annual Wellness Visit (AWV)  11/25/2022    Lipids  12/06/2022    TSH  12/06/2022    Depression Screen  01/07/2023    A1C test (Diabetic or Prediabetic)  03/31/2023    Diabetic microalbuminuria test  04/08/2023    Potassium  04/08/2023    Creatinine  04/08/2023    Colorectal Cancer Screen  03/15/2032    DEXA (modify frequency per FRAX score)  Completed    Hepatitis C screen  Completed    Hepatitis A vaccine  Aged Out    Hib vaccine  Aged Out    Meningococcal (ACWY) vaccine  Aged Out

## 2022-04-28 NOTE — PROGRESS NOTES
hyperlipidemia     Pure hypercholesterolemia     Toxic metabolic encephalopathy     Type II or unspecified type diabetes mellitus without mention of complication, not stated as uncontrolled     Unspecified asthma(493.90)     Unspecified venous (peripheral) insufficiency     Unspecified vitamin D deficiency     UTI (urinary tract infection) 2021       PAST SURGICAL HISTORY    Past Surgical History:   Procedure Laterality Date    COLONOSCOPY N/A 3/15/2022    COLONOSCOPY DIAGNOSTIC performed by Laura Hahn MD at [facility] [address]      x 3, Dr. William Jimenez  3/7/2022         THORACENTESIS  3/10/2022         TONSILLECTOMY AND ADENOIDECTOMY      UPPER GASTROINTESTINAL ENDOSCOPY N/A 3/15/2022    EGD BIOPSY performed by Laura Hahn MD at [address] [place] [address]    Family History   Problem Relation Age of Onset    Diabetes Mother     Heart Disease Mother     Heart Disease Father     Diabetes Sister     High Blood Pressure Sister     Diabetes Maternal Grandmother        SOCIAL HISTORY    Social History     Tobacco Use    Smoking status: Former Smoker     Packs/day: 0.25     Years: 10.00     Pack years: 2.50     Quit date: 2000     Years since quittin.3    Smokeless tobacco: Never Used   Vaping Use    Vaping Use: Never used   Substance Use Topics    Alcohol use: Not Currently     Alcohol/week: 0.0 standard drinks    Drug use: No       ALLERGIES    Allergies   Allergen Reactions    Latex Rash    Aleve [Naproxen Sodium]      Chronic kidney disease stage III, CHF    Pioglitazone Other (See Comments)     Congestive heart failure    Claritin [Loratadine]     Keflex [Cephalexin]     Lisinopril      Needs clarification of contraindication       MEDICATIONS    Current Outpatient Medications on File Prior to Encounter   Medication Sig Dispense Refill    vitamin D (ERGOCALCIFEROL) 1.25 MG (01954 UT) CAPS capsule Take 1 capsule by mouth once a week Sundays 12 capsule 0    amiodarone (CORDARONE) 200 MG tablet Take 1 tablet by mouth daily 90 tablet 0    mupirocin (BACTROBAN) 2 % cream Apply 3 times daily for 10 days for open blisters on the legs to prevent cellulitis 30 g 3    mupirocin (BACTROBAN) 2 % cream Apply 3 times daily for 10 days for open blisters on the legs 30 g 5    ferrous sulfate (IRON 325) 325 (65 Fe) MG tablet Take 1 tablet by mouth daily (with breakfast) 180 tablet 1    apixaban (ELIQUIS) 2.5 MG TABS tablet Take 1 tablet by mouth 2 times daily 60 tablet 0    Nebulizers (COMPRESSOR/NEBULIZER) MISC Nebulizer with compressor. Disposable Med Nebs 2 /month. Reusable Med Nebs 1 per 6 months. Aerosol Mask 1 per month. Replacement Filters 2 per month. All other related supplies as needed per month. Medications being used: Albuterol . 083 unit dose vial. Frequency: every 6 hrs x 4/day . Length of Need: 1 1 each 3    albuterol (PROVENTIL) (2.5 MG/3ML) 0.083% nebulizer solution Take 3 mLs by nebulization every 6 hours as needed for Wheezing or Shortness of Breath 125 each 0    albuterol sulfate HFA (PROAIR HFA) 108 (90 Base) MCG/ACT inhaler Inhale 2 puffs into the lungs every 6 hours as needed for Wheezing or Shortness of Breath (cough) 8 g 3    Misc.  Devices (ADJUST FOLD CANE/YORK HANDLE) MISC Use daily for walking 1 each 0    Handicap Placard MISC by Does not apply route Expires on 12/30/25 1 each 0    allopurinol (ZYLOPRIM) 300 MG tablet Take 1 tablet by mouth daily Per rheumatologist 90 tablet 3    atorvastatin (LIPITOR) 40 MG tablet Take 1 tablet by mouth once daily 90 tablet 3    bumetanide (BUMEX) 1 MG tablet Take 1 tablet by mouth 2 times daily 180 tablet 3    chlorhexidine (HIBICLENS) 4 % external liquid for decontamination AND WASH LEGS EVERY  mL 2    desloratadine (CLARINEX) 5 MG tablet Take 1 tablet by mouth once daily 90 tablet 3    FreeStyle Lancets MISC 1 each by Does not apply route daily Patient needs to contact office before any further refills will be approved 90 each 3    magnesium oxide (MAG-OX) 400 (241.3 Mg) MG TABS tablet Take 1 tablet by mouth twice daily 180 tablet 3    metoprolol tartrate (LOPRESSOR) 50 MG tablet Take 1 tablet by mouth 2 times daily 180 tablet 3    miconazole (MICOTIN) 2 % powder Apply topically 2 times daily UNDER THE SKIN FOLDS LONG TERM 45 g 1    Multiple Vitamins-Minerals (THERAPEUTIC MULTIVITAMIN-MINERALS) tablet Take 1 tablet by mouth daily 90 tablet 3    Bismuth Tribromoph-Petrolatum (XEROFORM PETROLATUM PATCH 2\"X2\") PADS external pads Apply 1 each topically daily 30 each 2    blood glucose test strips (FREESTYLE LITE) strip 1 each by In Vitro route daily As needed. 100 each 3    Blood Pressure KIT 1 kit by Does not apply route three times daily 1 kit 0    blood glucose monitor kit and supplies 1 kit by Other route three times daily Dispense Butterfly Elite CBG Device 1 kit 0     No current facility-administered medications on file prior to encounter. REVIEW OF SYSTEMS    Pertinent items are noted in HPI.     Objective:      BP (!) 183/67   Pulse 56   Temp 97 °F (36.1 °C) (Tympanic)   Resp 18   Wt 135 lb (61.2 kg)   BMI 27.27 kg/m²     Wt Readings from Last 3 Encounters:   04/28/22 135 lb (61.2 kg)   04/20/22 135 lb (61.2 kg)   04/15/22 135 lb 3.2 oz (61.3 kg)       PHYSICAL EXAM    General Appearance: alert and oriented to person, place and time, well developed and well- nourished, in no acute distress  Skin: warm and dry, no rash or erythema  Head: normocephalic and atraumatic  Neck: supple and non-tender without mass, no thyromegaly or thyroid nodules, no cervical lymphadenopathy  Pulmonary/Chest: clear to auscultation bilaterally- no wheezes, rales or rhonchi, normal air movement, no respiratory distress  Cardiovascular: normal rate, regular rhythm, normal S1 and S2, no murmurs, rubs, clicks, or gallops, distal pulses intact, no carotid bruits  Abdomen: soft, non-tender, non-distended, normal bowel sounds, no masses or organomegaly  Extremities: no cyanosis, clubbing or edema  Musculoskeletal: normal range of motion, no joint swelling, deformity or tenderness  Neurologic: reflexes normal and symmetric, no cranial nerve deficit, gait, coordination and speech normal      Assessment:     Problem List Items Addressed This Visit     Bilateral lower extremity edema - Primary    Relevant Orders    Initiate Outpatient Wound Care Protocol    Leg ulcer, right, with fat layer exposed ([address]    Relevant Orders    Initiate Outpatient Wound Care Protocol    Leg ulcer, left, with fat layer exposed ([address]    Relevant Orders    Initiate Outpatient Wound Care Protocol    Chronic venous stasis dermatitis of both lower extremities    Relevant Orders    Initiate Outpatient Wound Care Protocol           Procedure Note  Indications:  Based on my examination of this patient's wound(s)/ulcer(s) today, debridement is required to promote healing and evaluate the wound base. Performed by: Breanna Preston MD    Consent obtained:  Yes    Time out taken:  Yes    Pain Control: Anesthetic  Anesthetic: 4% Lidocaine Liquid Topical       Debridement:Excisional Debridement    Using curette the wound(s)/ulcer(s) was/were sharply debrided down through and including the removal of subcutaneous tissue. Devitalized Tissue Debrided:  fibrin, biofilm and slough    Pre Debridement Measurements:  Are located in the [place]  Documentation Flow Sheet    Wound/Ulcer #: 1    Post Debridement Measurements:  Wound/Ulcer Descriptions are Pre Debridement except measurements:        Wound 04/20/22 Leg Left;Lateral;Distal #2 left leg  (Active)   Wound Image   04/20/22 1348   Wound Etiology Venous 04/28/22 1313   Dressing Status New drainage noted; Old drainage noted 04/28/22 1313   Wound Cleansed Soap and water 04/28/22 1313   Dressing/Treatment Other (comment) 04/20/22 1444   Wound Length (cm) 0.3 cm 04/28/22 1313 Wound Width (cm) 0.3 cm 04/28/22 1313   Wound Depth (cm) 0.1 cm 04/28/22 1313   Wound Surface Area (cm^2) 0.09 cm^2 04/28/22 1313   Change in Wound Size % (l*w) 99.99 04/28/22 1313   Wound Volume (cm^3) 0.009 cm^3 04/28/22 1313   Wound Healing % 100 04/28/22 1313   Post-Procedure Length (cm) 0.3 cm 04/28/22 1313   Post-Procedure Width (cm) 0.3 cm 04/28/22 1313   Post-Procedure Depth (cm) 0.1 cm 04/28/22 1313   Post-Procedure Surface Area (cm^2) 0.09 cm^2 04/28/22 1313   Post-Procedure Volume (cm^3) 0.009 cm^3 04/28/22 1313   Wound Assessment Pink/red 04/28/22 1313   Drainage Amount Moderate 04/28/22 1313   Drainage Description Serous 04/28/22 1313   Odor None 04/28/22 1313   [place]-wound Assessment Blanchable erythema;Edematous 04/28/22 1313   Margins Defined edges 04/28/22 1313   Wound Thickness Description not for Pressure Injury Full thickness 04/28/22 1313   Number of days: 7       Wound 04/20/22 Leg Right;Lateral;Distal #1 (Active)   Wound Image   04/20/22 1348   Wound Etiology Venous 04/28/22 1313   Dressing Status New drainage noted; Old drainage noted 04/28/22 1313   Wound Cleansed Soap and water 04/28/22 1313   Dressing/Treatment Other (comment) 04/20/22 1444   Wound Length (cm) 4.5 cm 04/28/22 1313   Wound Width (cm) 4.4 cm 04/28/22 1313   Wound Depth (cm) 0.1 cm 04/28/22 1313   Wound Surface Area (cm^2) 19.8 cm^2 04/28/22 1313   Change in Wound Size % (l*w) 21.74 04/28/22 1313   Wound Volume (cm^3) 1.98 cm^3 04/28/22 1313   Wound Healing % 22 04/28/22 1313   Post-Procedure Length (cm) 4.5 cm 04/28/22 1313   Post-Procedure Width (cm) 4.4 cm 04/28/22 1313   Post-Procedure Depth (cm) 0.1 cm 04/28/22 1313   Post-Procedure Surface Area (cm^2) 19.8 cm^2 04/28/22 1313   Post-Procedure Volume (cm^3) 1.98 cm^3 04/28/22 1313   Wound Assessment [place]/red;Slough 04/28/22 1313   Drainage Amount Moderate 04/28/22 1313   Drainage Description Serosanguinous; Serous; Yellow 04/28/22 1313   Odor None 04/28/22 1313   [place]-wound Assessment Blanchable erythema;Edematous 04/28/22 1313   Margins Defined edges 04/28/22 1313   Wound Thickness Description not for Pressure Injury Full thickness 04/28/22 1313   Number of days: 7          Percent of Wound(s)/Ulcer(s) Debrided: 100%    Total Surface Area Debrided:  19.8 sq cm right leg only       Diabetic/Pressure/Non Pressure Ulcers only:  Ulcer: Non-Pressure ulcer, fat layer exposed      Estimated Blood Loss:  Minimal    Hemostasis Achieved:  by pressure    Procedural Pain:  5  / 10     Post Procedural Pain:  5 / 10     Response to treatment:  Poorly tolerated by patient., With complaints of pain. Plan:     Treatment Note please see Discharge Instructions    Written patient dismissal instructions given to patient and signed by patient or POA.              Electronically signed by Cesar Wild MD on 4/28/2022 at 1:24 PM

## 2022-04-28 NOTE — PATIENT INSTRUCTIONS
Patient Education        Learning About Carbohydrate (Carb) Counting and Eating Out When You Have Diabetes  Why plan your meals? Meal planning can be a key part of managing diabetes. Planning meals and snacks with the right balance of carbohydrate, protein, and fat can help you keep yourblood sugar at the target level you set with your doctor. You don't have to eat special foods. You can eat what your family eats, including sweets once in a while. But you do have to pay attention to how oftenyou eat and how much you eat of certain foods. You may want to work with a dietitian or a diabetes educator. They can give you tips and meal ideas and can answer your questions about meal planning. This health professional can also help you reach a healthy weight if that is one ofyour goals. What should you know about eating carbs? Managing the amount of carbohydrate (carbs) you eat is an important part ofhealthy meals when you have diabetes. Carbohydrate is found in many foods.  Learn which foods have carbs. And learn the amounts of carbs in different foods. ? Bread, cereal, pasta, and rice have about 15 grams of carbs in a serving. A serving is 1 slice of bread (1 ounce), ½ cup of cooked cereal, or 1/3 cup of cooked pasta or rice. ? Fruits have 15 grams of carbs in a serving. A serving is 1 small fresh fruit, such as an apple or orange; ½ of a banana; ½ cup of cooked or canned fruit; ½ cup of fruit juice; 1 cup of melon or raspberries; or 2 tablespoons of dried fruit. ? Milk and no-sugar-added yogurt have 15 grams of carbs in a serving. A serving is 1 cup of milk or 3/4 cup (6 oz) of no-sugar-added yogurt. ? Starchy vegetables have 15 grams of carbs in a serving. A serving is ½ cup of mashed potatoes or sweet potato; 1 cup winter squash; ½ of a small baked potato; ½ cup of cooked beans; or ½ cup cooked corn or green peas.    Learn how much carbs to eat each day and at each meal. A dietitian or certified diabetes educator can teach you how to keep track of the amount of carbs you eat. This is called carbohydrate counting.  If you are not sure how to count carbohydrate grams, use the plate method to plan meals. It is a quick way to make sure that you have a balanced meal. It also can help you manage the amount of carbohydrate you eat at meals. ? Divide your plate by types of foods. Put non-starchy vegetables on half the plate, meat or other protein food on one-quarter of the plate, and a grain or starchy vegetable in the final quarter of the plate. To this you can add a small piece of fruit and 1 cup of milk or yogurt, depending on how many carbs you are supposed to eat at a meal.   Try to eat about the same amount of carbs at each meal. Do not \"save up\" your daily allowance of carbs to eat at one meal.   Proteins have very little or no carbs. Examples of proteins are beef, chicken, turkey, fish, eggs, tofu, cheese, cottage cheese, and peanut butter. How can you eat out and still eat healthy?  Learn to estimate the serving sizes of foods that have carbohydrate. If you measure food at home, it will be easier to estimate the amount in a serving of restaurant food.  If the meal you order has too much carbohydrate (such as potatoes, corn, or baked beans), ask to have a low-carbohydrate food instead. Ask for a salad or non-starchy vegetables like broccoli, cauliflower, green beans, or peppers.  If you eat more carbohydrate at a meal than you had planned, take a walk or do other exercise. This will help lower your blood sugar. What are some tips for eating healthy?  Limit saturated fat, such as the fat from meat and dairy products. This is a healthy choice because people who have diabetes are at higher risk of heart disease. So choose lean cuts of meat and nonfat or low-fat dairy products. Use olive or canola oil instead of butter or shortening when cooking.  Don't skip meals.  Your blood sugar may drop too low if you skip meals and take insulin or certain medicines for diabetes.  Check with your doctor before you drink alcohol. Alcohol can cause your blood sugar to drop too low. Alcohol can also cause a bad reaction if you take certain diabetes medicines. Follow-up care is a key part of your treatment and safety. Be sure to make and go to all appointments, and call your doctor if you are having problems. It's also a good idea to know your test results and keep alist of the medicines you take. Where can you learn more? Go to https://qunbpepicewSunfire.[employer]. org and sign in to your [employer] account. Enter T537 in the [employer] box to learn more about \"Learning About Carbohydrate (Carb) Counting and Eating Out When You Have Diabetes. \"     If you do not have an account, please click on the \"Sign Up Now\" link. Current as of: September 8, 2021               Content Version: 13.2  © 2006-2022 [employer]. Care instructions adapted under license by [facility] ([facility]). If you have questions about a medical condition or this instruction, always ask your healthcare professional. [place] [address] any warranty or liability for your use of this information. Patient Education        Low Sodium Diet (2,000 Milligram): Care Instructions  Overview     Limiting sodium can be an important part of managing some health problems. The most common source of sodium is salt. People get most of the salt in their diet from canned, prepared, and packaged foods. Fast food and restaurant meals also are very high in sodium. Your doctor will probably limit your sodium to less than 2,000 milligrams (mg) a day. This limit counts all the sodium inprepared and packaged foods and any salt you add to your food. Follow-up care is a key part of your treatment and safety. Be sure to make and go to all appointments, and call your doctor if you are having problems.  It's also a good idea to know your test results and keep alist of the medicines you take. How can you care for yourself at home? Read food labels   Read labels on cans and food packages. The labels tell you how much sodium is in each serving. Make sure that you look at the serving size. If you eat more than the serving size, you have eaten more sodium.  Food labels also tell you the Percent Daily Value for sodium. Choose products with low Percent Daily Values for sodium.  Be aware that sodium can come in forms other than salt, including monosodium glutamate (MSG), sodium citrate, and sodium bicarbonate (baking soda). MSG is often added to Asian food. When you eat out, you can sometimes ask for food without MSG or added salt. Buy low-sodium foods   Buy foods that are labeled \"unsalted\" (no salt added), \"sodium-free\" (less than 5 mg of sodium per serving), or \"low-sodium\" (140 mg or less of sodium per serving). Foods labeled \"reduced-sodium\" and \"light sodium\" may still have too much sodium. Be sure to read the label to see how much sodium you are getting.  Buy fresh vegetables, or frozen vegetables without added sauces. Buy low-sodium versions of canned vegetables, soups, and other canned goods. Prepare low-sodium meals   Cut back on the amount of salt you use in cooking. This will help you adjust to the taste. Do not add salt after cooking. One teaspoon of salt has about 2,300 mg of sodium.  Take the salt shaker off the table.  Flavor your food with garlic, lemon juice, onion, vinegar, herbs, and spices. Do not use soy sauce, lite soy sauce, steak sauce, onion salt, garlic salt, celery salt, or ketchup on your food.  Use low-sodium salad dressings, sauces, and ketchup. Or make your own salad dressings and sauces without adding salt.  Use less salt (or none) when recipes call for it. You can often use half the salt a recipe calls for without losing flavor. Other foods such as rice, pasta, and grains do not need added salt.    Rinse canned vegetables, and cook them in fresh water. This removes some--but not all--of the salt.  Avoid water that is naturally high in sodium or that has been treated with water softeners, which add sodium. If you buy bottled water, read the label and choose a sodium-free brand. Avoid high-sodium foods   Avoid eating:  ? Smoked, cured, salted, and canned meat, fish, and poultry. ? Ham, de león, hot dogs, and luncheon meats. ? Regular, hard, and processed cheese and regular peanut butter. ? Crackers with salted tops, and other salted snack foods such as pretzels, chips, and salted popcorn. ? Frozen prepared meals, unless labeled low-sodium. ? Canned and dried soups, broths, and bouillon, unless labeled sodium-free or low-sodium. ? Canned vegetables, unless labeled sodium-free or low-sodium. ? [place] fries, pizza, tacos, and other fast foods. ? Pickles, olives, ketchup, and other condiments, especially soy sauce, unless labeled sodium-free or low-sodium. Where can you learn more? Go to https://Tonbo ImagingpeTeraDiode.[employer]. org and sign in to your [employer] account. Enter I217 in the [facility] box to learn more about \"Low Sodium Diet (2,000 Milligram): Care Instructions. \"     If you do not have an account, please click on the \"Sign Up Now\" link. Current as of: September 8, 2021               Content Version: 13.2  © 2006-2022 [employer]. Care instructions adapted under license by [facility] ([facility]). If you have questions about a medical condition or this instruction, always ask your healthcare professional. [place] [address] any warranty or liability for your use of this information.

## 2022-04-28 NOTE — PROGRESS NOTES
Compression [employer] for [employer]:     [facility] [address] f: [phone] f: [phone] p: [phone] [email or web address]      Ordering Center:   [address] [facility] and [facility]  [address] [facility] [address]  [phone]  [phone]    Patient Information:      [employer]  [facility] [address]   [phone]   : 1950  AGE: 66 y.o. GENDER: female   TODAYS DATE:  2022    Insurance:      PRIMARY INSURANCE:  Plan: MEDICARE PART B  Coverage: MEDICARE  Effective Date: 2015  Group Number: [unfilled]  Subscriber Number: [id number] - (Medicare)    Payor/Plan Subscr  Sex Relation Sub. Ins. ID Effective Group Num   1. [address] 1950 Female Self [id number] 1/1/15                                    [address] [address]   2. [address] 1950 Female Self [id number] 1/1/15                                    [address] [address]       Patient Information:      Problem List Items Addressed This Visit        Other    Bilateral lower extremity edema - Primary    Relevant Orders    Initiate Outpatient Wound Care Protocol    Leg ulcer, right, [facility]    Relevant Orders    Initiate Outpatient Wound Care Protocol    Leg ulcer, left, with fat layer exposed [facility])    Relevant Orders    Initiate Outpatient Wound Care Protocol    Chronic venous stasis dermatitis of both lower extremities    Relevant Orders    Initiate Outpatient Wound Care Protocol          Wound 22 Leg Left;Lateral;Distal #2 left leg  (Active)   Wound Image   22 1348   Wound Etiology Venous 22 1313   Dressing Status New drainage noted; Old drainage noted 22 1313   Wound Cleansed Soap and water 22 1313   Dressing/Treatment Other (comment) 22 1444   Wound Length (cm) 0.3 cm 22 1313   Wound Width (cm) 0.3 cm 04/28/22 1313   Wound Depth (cm) 0.1 cm 04/28/22 1313   Wound Surface Area (cm^2) 0.09 cm^2 04/28/22 1313   Change in Wound Size % (l*w) 99.99 04/28/22 1313   Wound Volume (cm^3) 0.009 cm^3 04/28/22 1313   Wound Healing % 100 04/28/22 1313   Post-Procedure Length (cm) 0.3 cm 04/28/22 1313   Post-Procedure Width (cm) 0.3 cm 04/28/22 1313   Post-Procedure Depth (cm) 0.1 cm 04/28/22 1313   Post-Procedure Surface Area (cm^2) 0.09 cm^2 04/28/22 1313   Post-Procedure Volume (cm^3) 0.009 cm^3 04/28/22 1313   Wound Assessment Pink/red 04/28/22 1313   Drainage Amount Moderate 04/28/22 1313   Drainage Description Serous 04/28/22 1313   Odor None 04/28/22 1313   Marian-wound Assessment Blanchable erythema;Edematous 04/28/22 1313   Margins Defined edges 04/28/22 1313   Wound Thickness Description not for Pressure Injury Full thickness 04/28/22 1313   Number of days: 7       Wound 04/20/22 Leg Right;Lateral;Distal #1 (Active)   Wound Image   04/20/22 1348   Wound Etiology Venous 04/28/22 1313   Dressing Status New drainage noted; Old drainage noted 04/28/22 1313   Wound Cleansed Soap and water 04/28/22 1313   Dressing/Treatment Other (comment) 04/20/22 1444   Wound Length (cm) 4.5 cm 04/28/22 1313   Wound Width (cm) 4.4 cm 04/28/22 1313   Wound Depth (cm) 0.1 cm 04/28/22 1313   Wound Surface Area (cm^2) 19.8 cm^2 04/28/22 1313   Change in Wound Size % (l*w) 21.74 04/28/22 1313   Wound Volume (cm^3) 1.98 cm^3 04/28/22 1313   Wound Healing % 22 04/28/22 1313   Post-Procedure Length (cm) 4.5 cm 04/28/22 1313   Post-Procedure Width (cm) 4.4 cm 04/28/22 1313   Post-Procedure Depth (cm) 0.1 cm 04/28/22 1313   Post-Procedure Surface Area (cm^2) 19.8 cm^2 04/28/22 1313   Post-Procedure Volume (cm^3) 1.98 cm^3 04/28/22 1313   Wound Assessment [place]/red;Slough 04/28/22 1313   Drainage Amount Moderate 04/28/22 1313   Drainage Description Serosanguinous; Serous; Yellow 04/28/22 1313   Odor None 04/28/22 1313   Marian-wound Assessment Blanchable erythema;Edematous 04/28/22 1313   Margins Defined edges 04/28/22 1313   Wound Thickness Description not for Pressure Injury Full thickness 04/28/22 1313   Number of days: 7       Right Leg Measurements: (ALL measurements are in cm)  Right Leg Edema Point of Measurement  Great toe to forefoot: 10 cm  Heel to ankle: 10 cm  Heel to calf: 30  Leg circumference: 39.5 cm  Ankle circumference: 24.8 cm  Foot circumference: 24 cm  Compression Therapy: Compression ordered,Ace wrap  New order Juxta lite Circaid  Ankle to knee 32cm  Left Leg Measurements: (ALL measurements are in cm)  Left Leg Edema Point of Measurement  Great toe to forefoot: 10 cm  Heel to ankle: 10 cm  Heel to calf: 30  Leg circumference: 39.2 cm  Ankle circumference: 26.6 cm  Foot circumference: 24 cm  Compression Therapy: Compression ordered,Ace wrap  New order Juxta lite Circaide ankle to knee 32 cm  Supplies Requested :     Medicare Requirements  Patient must have a qualifying Active Venous Ulcer if ordering Bilateral Compression Wounds MUST be present on both legs for Medicare Coverage. The patient can Not be on home health or have had a Medicare part A stay in the past 24 hours.     Patient Wound(s) Debrided: [x] Yes if yes please add date4/28/22  [] No    Debribement Type: Excisional/Sharp    Patient currently being seen by Home Health: [] Yes   [x] No     Compression Type: Circaid Juxtalite, HD, 30-40 mm/Hg, BILATERAL lower legs     Provider Information:      PROVIDER'S NAME: Dora Adkins MD  VERONICA-[id number]

## 2022-04-28 NOTE — PROGRESS NOTES
Cool-[place] Application   Below Knee    NAME:  Avni Hathaway  YOB: 1950  MEDICAL RECORD NUMBER:  [id number]  DATE:  4/28/2022     [x] Removed old Harjinder Hummer boot if indicated and wash leg with mild soap and water.  [x] Applied moisturizing agent to dry skin as needed.  [x] Appied primary and secondary dressing as ordered     [x] Applied Unna roll from toes to knee overlapping each time.  [x] Applied ace wrap or coban from toes to below the knee.  [x] Secured with tape and/or metal clips covered with tape.  [x] Instructed patient/caregiver to keep dressing dry and intact. DO NOT REMOVE DRESSING.  [x] Instructed pt/family/caregiver to report excessive draining, loose bandage, wet dressing, severe pain or tingling in toes.  [x] Applied Cool-[place] dressing below the knee to Bilateral lower leg(s)        Unna Boot(s) were applied per  Guidelines.      Electronically signed by Maite Carey RN on 4/28/2022 at 3:04 PM

## 2022-04-28 NOTE — PROGRESS NOTES
Branden Mayo (:  1950) is a 66 y.o. female,Established patient, here for evaluation of the following chief complaint(s): Diabetes, Hypertension, Congestive Heart Failure, and Wound Infection (declines home care)      ASSESSMENT/PLAN:    1. Chronic diastolic CHF (congestive heart failure) ([facility] [address]  Stable  Seeing cardiologist  Lab Results   Component Value Date    LVEF 55 2022     Recheck labs, continue current medications: Bumex 1 mg twice a day, metoprolol 50 Mg twice a day, magnesium 400 mg daily  Low-salt diet advised    -     Brain Natriuretic Peptide; Future  -     CBC with Auto Differential; Future  -     Comprehensive Metabolic Panel; Future  -     Magnesium; Future    2. HTN. Benign hypertension with CKD (chronic kidney disease) stage III (HCC)   stable chronic kidney disease stage III  GFR 30 on 2022, was 28 on 3/24/2022  Well controlled hypertension. Continue current treatment. Bumex 1 mg twice a day, metoprolol 50 Mg twice a day, with magnesium 400 mg daily. Will recheck labs    -     CBC with Auto Differential; Future  -     Comprehensive Metabolic Panel; Future  -     Magnesium; Future  -     Phosphorus; Future  -     Uric Acid; Future  3. Type 2 diabetes mellitus with stage 3a chronic kidney disease, without long-term current use of insulin (HCC)  Improved  Hemoglobin A1c can be falsely low due to coexistent anemia  Continue low-carb diet  Okay not to self monitor home blood glucose , we will continue to monitor A1c every 3 to 6 months  Lab Results   Component Value Date    LABA1C 4.6 2022    LABA1C 5.8 2021    LABA1C 5.7 2021   Annual eye exam discussed    -     CBC with Auto Differential; Future  -     Comprehensive Metabolic Panel; Future  4.  Paroxysmal atrial fibrillation (HCC)  Stable  Continue amiodarone 200 mg daily, metoprolol 50 mg twice a day for rate control   Continue chronic anticoagulation with apixaban 2.5 mg twice a day  Recheck electrolytes  Follow-up with cardiologist as scheduled  -     Comprehensive Metabolic Panel; Future  -     Magnesium; Future  5. Vitamin D deficiency  Still very low, 19, was 18.1 on 9/25/2020  Lab Results   Component Value Date    VITD25 19.0 (L) 03/18/2021   Continue supplementation.    -     vitamin D (ERGOCALCIFEROL) 1.25 MG (93170 UT) CAPS capsule; Take 1 capsule by mouth once a week Sundays, Disp-12 capsule, R-0Normal  6. Bleeding nose  Likely due to dryness, and chronic anticoagulation, advised to avoid trauma and to start saline gel  -     saline nasal gel (AYR) GEL; by Nasal route as needed for Congestion For nose bleeds, 2-3 times a day intranasal prn, Nasal, PRN Starting Thu 4/28/2022, Disp-14.1 g, R-3, Normal  7. Postmenopausal  -     DEXA BONE DENSITY AXIAL SKELETON; Future  8. Encounter for screening mammogram for breast cancer  -     [facility]ADE DIGITAL SCREEN BILATERAL; Future  9. Encounter for immunization  -     COVID-19 mRNA Vac-Cait,Pfizer, (PFIZER-BIONT COVID-19 VAC-CAIT) 30 MCG/0.3ML SUSP injection; Inject 0.3 mLs into the muscle once for 1 dose DUE FOR BOOSTER, PLEASE LET PT KNOW WHEN READY, Disp-0.3 mL, R-0Normal  10. Leg ulcer, left, with fat layer exposed ([address]  Improving  Continue Unna boot treatment and wound care treatment weekly  Patient absolutely declines home care, continues to follow-up weekly with wound care. 11. Leg ulcer, right, with fat layer exposed ([address]  Improving  Continue Unna boot treatment and wound care treatment weekly  Patient absolutely declines home care, continues to follow-up weekly with wound care. 12. Chronic venous stasis dermatitis of both lower extremities  Worsening  Patient did have multiple episodes of cellulitis, currently with bilateral leg ulcers  Continue Unna boots for wound care  Pending vascular testing ordered by wound care    13.  Unintended weight loss  Advised her to increase the proteins in diet to help her weight and to help her healing      Patient needs new medication sent to both mail order and local pharmacy because it takes 3 to 4 weeks for her to receive them from the mail order. Allie Valadez received counseling on the following healthy behaviors: nutrition, exercise, medication adherence and weight loss and falls precautions. Reviewed prior labs and health maintenance  Discussed use, benefit, and side effects of prescribed medications. Barriers to medication compliance addressed. Patient given educational materials - see patient instructions  Was a self-tracking handout given in paper form or via [employer]? Yes  All patient questions answered. Patient voiced understanding. The patient's past medical,surgical, social, and family history as well as her current medications and allergies were reviewed as documented in today's encounter. Medications, labs, diagnostic studies, consultations and follow-up as documented in this encounter. Return in about 3 months (around 7/28/2022) for ALWAYS NEEDS 30 MIN. APPT, diabetes, HTN, CHF, LABS F/U. Cancel 5/4/22 with me    Future Appointments   Date Time Provider Angela Reyez   4/29/2022  2:00 PM Vazquez Riley MD [facility]   5/4/2022  3:30 PM Beronica Bass MD [facility]   5/5/2022  2:00 PM Manny Edmondson MD [facility] [address]   5/19/2022  1:00 PM [facility] VASCULAR [address]   5/19/2022  2:00  [facility] VASCULAR [address]   5/25/2022  2:15 PM Bart Guevara MD [address] [place] [address]   11/25/2022  3:30 PM Beronica Bass MD [facility]         SUBJECTIVE/OBJECTIVE:    Comes with her daughter , Ez Jo. Patient reports she still struggles with the swelling on her legs and she has 1 wound on each leg, improving. Has been going to wound care weekly. I offered home care but she absolutely declines due to fear for COVID-19. She has upcoming vascular testing for her legs.   She has history of multiple episodes of cellulitis. Patient was seen last time on 4/28/2022, today, at wound care, new Unna boots were applied. She was also treated with doxycycline which she completed. Per note from Dr. Mae De Oliveira from 4/28/2022  Right leg lateral side wound, 4.5 cm x 4.4 cm x 1 mm   Left leg posterior aspect and towards the ankle, 3 mm x 2 mm x 1 mm      Bony Low complains of being Bloated and her abdomen is protruding. Has bowel movement every day. There is unintentional weight loss of 10 pounds in 3 months. She reports good appetite in general.   She does have appointment with GI specialist.  She is asking why she does have anemia, per recent report from the GI specialist, she needs capsule endoscopy. Advised to discuss with GI. Wt Readings from Last 3 Encounters:   04/28/22 133 lb (60.3 kg)   04/28/22 135 lb (61.2 kg)   04/20/22 135 lb (61.2 kg)     01/07/22 143 lb (64.9 kg)     CT abdomen 5/21/21  No tumor, kidney stone on the right side, observation per urology at this time    1.6 cm right adrenal nodule, severe central canal stenosis, she does have chronic back pain but she is not worried about this right now. I advised that she does not have any abdominal cancer at this time    1.5 cm nonobstructing right renal calculus.  No acute obstructive uropathy.       1.6 cm fat attenuation right adrenal gland nodule consistent with a   myelolipoma.       Mild sigmoid colon diverticulosis.       Urinary bladder is grossly normal.  Light of the history of hematuria,   follow-up cystoscopy may be helpful.       Severe central canal stenosis at L4-L5. Hypertension, Chronic kidney disease stage 3  atrial fibrillation , congestive heart failure   Patient here for follow-up. She is not exercising and is adherent to low salt diet. Blood pressure is well controlled at home. Cardiac symptoms dyspnea, fatigue and lower extremity edema.    Patient denies chest pain, chest pressure/discomfort, claudication, exertional chest pressure/discomfort, irregular heart beat, near-syncope, orthopnea, palpitations, paroxysmal nocturnal dyspnea, syncope and tachypnea. Cardiovascular risk factors: advanced age (older than 54 for men, 72 for women), diabetes mellitus, dyslipidemia, hypertension and sedentary lifestyle. Use of agents associated with hypertension: none. History of target organ damage: angina/ prior myocardial infarction, chronic kidney disease, heart failure and prior coronary revascularization, history of stroke. Has stents  She was found to have atrial fibrillation in July 2020 for which she was admitted. She is on chronic anticoagulation. She reports frequent nose bleeds hard to stop, but they do stop with direct pressure after 10 to 15 minutes. I advised her to continue the same and she can use nasal gel. Systolic blood pressure is borderline high  BP Readings from Last 3 Encounters:   04/28/22 (!) 160/80   04/28/22 (!) 183/67   04/20/22 (!) 187/69          Pulse is Normal.    Pulse Readings from Last 3 Encounters:   04/28/22 72   04/28/22 56   04/20/22 62         Lab Results   Component Value Date    LVEF 55 02/07/2022     Patient has known type 2 diabetes mellitus  A1c is improving but she does have anemia, which may make A1c look lower  She was previously on metformin, but was discontinued due to chronic kidney disease stage III    Denies increased appetite, thirst or polyuria. A1c greatly improved. Lab Results   Component Value Date    LABA1C 4.6 03/31/2022    LABA1C 5.8 11/24/2021    LABA1C 5.7 06/29/2021   Her A1c was 8.3 on 7/27/2020    Previously she was on losartan but we discontinued due to worsening kidney disease. LDL is is well controlled, she is on statin, Lipitor.   Lab Results   Component Value Date    LDLCHOLESTEROL 50 12/06/2021     Lab Results   Component Value Date    TRIG 132 12/06/2021    TRIG 112 07/27/2020    TRIG 279 (H) 03/11/2020         At Menopause since 46 yo  She would benefit from getting DEXA scan. Annemarie Uriarte has Vitamin D deficiency. Annemarie Uriarte  is  taking Vitamin D supplementation   she feels tired. Lab Results   Component Value Date    VITD25 19.0 (L) 03/18/2021       She is due for Mammogram.   Denies breast pain, lumps or nipple discharge. She declines breast exam today. Due for COVID 19 vaccine, she is still not decided, but might get it. Prior to Visit Medications    Medication Sig Taking? Authorizing Provider   vitamin D (ERGOCALCIFEROL) 1.25 MG (00634 UT) CAPS capsule Take 1 capsule by mouth once a week Sundays Yes Belvin Cabot, MD   amiodarone (CORDARONE) 200 MG tablet Take 1 tablet by mouth daily Yes Belvin Cabot, MD   mupirocin (BACTROBAN) 2 % cream Apply 3 times daily for 10 days for open blisters on the legs to prevent cellulitis Yes Belvin Cabot, MD   mupirocin (BACTROBAN) 2 % cream Apply 3 times daily for 10 days for open blisters on the legs Yes Belvin Cabot, MD   apixaban (ELIQUIS) 2.5 MG TABS tablet Take 1 tablet by mouth 2 times daily Yes Wm Clarke MD   Nebulizers (COMPRESSOR/NEBULIZER) MISC Nebulizer with compressor. Disposable Med Nebs 2 /month. Reusable Med Nebs 1 per 6 months. Aerosol Mask 1 per month. Replacement Filters 2 per month. All other related supplies as needed per month. Medications being used: Albuterol . 083 unit dose vial. Frequency: every 6 hrs x 4/day . Length of Need: 1 Yes Corrie Wharton MD   albuterol (PROVENTIL) (2.5 MG/3ML) 0.083% nebulizer solution Take 3 mLs by nebulization every 6 hours as needed for Wheezing or Shortness of Breath Yes Corrie Wharton MD   albuterol sulfate HFA (PROAIR HFA) 108 (90 Base) MCG/ACT inhaler Inhale 2 puffs into the lungs every 6 hours as needed for Wheezing or Shortness of Breath (cough) Yes Corrie Wharton MD   Misc.  Devices (ADJUST FOLD CANE/YORK HANDLE) MISC Use daily for walking Yes Corrie Wharton MD   Handicap Placard MISC by Does not apply route Expires on 12/30/25 Yes Arnol Muro MD   allopurinol (ZYLOPRIM) 300 MG tablet Take 1 tablet by mouth daily Per rheumatologist Yes Jennifer Juarez MD   atorvastatin (LIPITOR) 40 MG tablet Take 1 tablet by mouth once daily Yes Jennifer Juarez MD   bumetanide (BUMEX) 1 MG tablet Take 1 tablet by mouth 2 times daily Yes Jennifer Juarez MD   chlorhexidine (HIBICLENS) 4 % external liquid for decontamination AND [address] LEGS EVERY DAY Yes Jennifer Juarez MD   desloratadine (CLARINEX) 5 MG tablet Take 1 tablet by mouth once daily Yes Jennifer Juarez MD   FreeStyle Lancets MISC 1 each by Does not apply route daily Patient needs to contact office before any further refills will be approved Yes Jennifer Juarez MD   magnesium oxide (MAG-OX) 400 (241.3 Mg) MG TABS tablet Take 1 tablet by mouth twice daily Yes Jennifer Juarez MD   metoprolol tartrate (LOPRESSOR) 50 MG tablet Take 1 tablet by mouth 2 times daily Yes Jennifer Juarez MD   miconazole (MICOTIN) 2 % powder Apply topically 2 times daily UNDER THE SKIN FOLDS LONG TERM Yes Jennifer Juarez MD   Multiple Vitamins-Minerals (THERAPEUTIC MULTIVITAMIN-MINERALS) tablet Take 1 tablet by mouth daily Yes Jennifer Juarez MD   Bismuth Tribromoph-Petrolatum (XEROFORM PETROLATUM PATCH 2\"X2\") PADS external pads Apply 1 each topically daily Yes Ruby Skelton, APRN - CNP   blood glucose test strips (FREESTYLE LITE) strip 1 each by In Vitro route daily As needed.  Yes Jennifer Juarez MD   Blood Pressure KIT 1 kit by Does not apply route three times daily Yes Karen Lorenzo MD   blood glucose monitor kit and supplies 1 kit by Other route three times daily Dispense Butterfly Elite CBG Device Yes Catherine Nava MD   ferrous sulfate (IRON 325) 325 (65 Fe) MG tablet Take 1 tablet by mouth daily (with breakfast)  Patient not taking: Reported on 4/28/2022  Arnol Muro MD       Social History     Tobacco Use    Smoking status: Former Smoker     Packs/day: 0.25 Years: 10.00     Pack years: 2.50     Quit date: 2000     Years since quittin.3    Smokeless tobacco: Never Used   Vaping Use    Vaping Use: Never used   Substance Use Topics    Alcohol use: Not Currently     Alcohol/week: 0.0 standard drinks    Drug use: No         Review of Systems   Constitutional: Positive for fatigue and unexpected weight change (lost). Negative for activity change, appetite change, chills, diaphoresis and fever. HENT: Positive for nosebleeds. Eyes: Positive for visual disturbance (stable, has glasses). Respiratory: Positive for shortness of breath (FLORES). Negative for cough, chest tightness and wheezing. Cardiovascular: Positive for leg swelling (b/l). Negative for chest pain and palpitations. Gastrointestinal: Positive for abdominal distention. Negative for abdominal pain, blood in stool, constipation, diarrhea, nausea and vomiting. Endocrine: Negative for cold intolerance, heat intolerance, polydipsia, polyphagia and polyuria. Musculoskeletal: Positive for gait problem. Comes in wheelchair   Skin: Positive for rash (legs, chronic) and wound (legs). Hematological: Bruises/bleeds easily.         -vital signs stable and within normal limits except increased systolic blood pressure and mildly overweight per BMI    BP (!) 160/80   Pulse 72   Temp 97.1 °F (36.2 °C)   Ht 4' 11\" (1.499 m)   Wt 133 lb (60.3 kg)   SpO2 99%   BMI 26.86 kg/m²        Physical Exam  Vitals and nursing note reviewed. Constitutional:       General: She is not in acute distress. Appearance: Normal appearance. She is well-developed. She is not diaphoretic. HENT:      Head: Normocephalic and atraumatic. Right Ear: External ear normal. Decreased hearing noted. Left Ear: External ear normal. Decreased hearing noted. Nose: No mucosal edema.       Mouth/Throat:      Comments: I did not examine the mouth due to coronavirus pandemic and wearing masks    Eyes: General: Lids are normal. No scleral icterus. Right eye: No discharge. Left eye: No discharge. Extraocular Movements: Extraocular movements intact. Conjunctiva/sclera: Conjunctivae normal.   Neck:      Thyroid: No thyromegaly. Cardiovascular:      Rate and Rhythm: Normal rate. Rhythm regularly irregular. Heart sounds: Normal heart sounds. No murmur heard. Pulmonary:      Effort: Pulmonary effort is normal. No respiratory distress. Breath sounds: Normal breath sounds. No wheezing or rales. Chest:      Chest wall: No tenderness. Abdominal:      General: Bowel sounds are normal. There is distension. Palpations: Abdomen is soft. There is no hepatomegaly, splenomegaly or mass. Tenderness: There is no abdominal tenderness. There is no guarding or rebound. Comments: Nontender, prominent abdomen, prior CT abdomen reviewed with patient, reassurance given. Musculoskeletal:         General: Deformity present. No tenderness. Cervical back: Normal range of motion and neck supple. Right lower le+ Pitting Edema present. Left lower le+ Pitting Edema present. Comments: Thoracic kyphosis   Skin:     General: Skin is warm and dry. Capillary Refill: Capillary refill takes less than 2 seconds. Findings: Erythema and rash present. Comments: Erythematous rash bilateral legs consistent with bilateral stasis dermatitis  Per note from Dr. Gabriela Bain from 2022. Right leg lateral side wound, 4.5 cm x 4.4 cm x 1 mm   Left leg posterior aspect and towards the ankle, 3 mm x 2 mm x 1 mm  I did not remove the Unna boots. Neurological:      Mental Status: She is alert and oriented to person, place, and time. Cranial Nerves: No cranial nerve deficit. Motor: No abnormal muscle tone. Gait: Gait abnormal.      Comments: Comes in wheelchair today, as she is coming directly from wound care.    Psychiatric:         Mood and Affect: Mood is anxious. Behavior: Behavior normal.         Thought Content: Thought content normal.         Judgment: Judgment normal.           I personally reviewed testing with patient and all questions fully answered. Wound infection based on wound culture, she received Doxycycline  Anemia  Chronic kidney disease stage III stable  Vitamin D deficiency  Mild hyperglycemia  Increased LFTs    Anemia never received the iron      Otherwise labs within normal limits    Hospital Outpatient Visit on 04/20/2022   Component Date Value Ref Range Status    Specimen Description 04/20/2022 . LEG RIGHT   Final    Direct Exam 04/20/2022 RARE NEUTROPHILS*  Final    Direct Exam 04/20/2022 NO BACTERIA SEEN   Final    Culture 04/20/2022 NORMAL SKIN STORM   Final    Culture 04/20/2022 GRAM NEGATIVE RODS Growth in broth medium only. The significance of broth-only isolates has not been established. Clinical correlation suggested. *  Final    Culture 04/20/2022 No anaerobic organisms isolated at 5 days.    Final         Lab Results   Component Value Date    WBC 7.1 03/24/2022    HGB 10.5 (L) 04/08/2022    HCT 34.5 (L) 04/08/2022    .0 03/24/2022     03/24/2022       Lab Results   Component Value Date     04/08/2022    K 4.2 04/08/2022     04/08/2022    CO2 22 04/08/2022    BUN 82 04/08/2022    CREATININE 1.66 04/08/2022    GLUCOSE 150 04/08/2022    CALCIUM 9.0 04/08/2022        Lab Results   Component Value Date    ALT 25 03/05/2022    AST 40 (H) 03/05/2022    ALKPHOS 184 (H) 03/05/2022    BILITOT 0.29 (L) 03/05/2022       Lab Results   Component Value Date    TSH 2.60 12/06/2021       Lab Results   Component Value Date    CHOL 135 12/06/2021    CHOL 117 07/27/2020    CHOL 188 03/11/2020     Lab Results   Component Value Date    TRIG 132 12/06/2021    TRIG 112 07/27/2020    TRIG 279 (H) 03/11/2020     Lab Results   Component Value Date    HDL 59 12/06/2021    HDL 48 07/27/2020    HDL 55 03/11/2020     Lab Results   Component Value Date    LDLCHOLESTEROL 50 12/06/2021    LDLCHOLESTEROL 47 07/27/2020    LDLCHOLESTEROL 77 03/11/2020     Lab Results   Component Value Date    CHOLHDLRATIO 2.3 12/06/2021    CHOLHDLRATIO 2.4 07/27/2020    CHOLHDLRATIO 3.4 03/11/2020       Lab Results   Component Value Date    EXMWIHZB79 729 03/02/2022     Lab Results   Component Value Date    FOLATE >20.0 03/02/2022     Lab Results   Component Value Date    VITD25 19.0 (L) 03/18/2021         Orders Placed This Encounter   Medications    saline nasal gel (AYR) GEL     Sig: by Nasal route as needed for Congestion For nose bleeds, 2-3 times a day intranasal prn     Dispense:  14.1 g     Refill:  3    vitamin D (ERGOCALCIFEROL) 1.25 MG (19519 UT) CAPS capsule     Sig: Take 1 capsule by mouth once a week Sundays     Dispense:  12 capsule     Refill:  0    saline nasal gel (AYR) GEL     Sig: by Nasal route as needed for Congestion (and nose bleeds, 2-3 times a day)     Dispense:  14.5 g     Refill:  3    COVID-19 mRNA Vac-Jeovanny,Pfizer, (PFIZER-BIONT COVID-19 VAC-JEOVANNY) 30 MCG/0.3ML SUSP injection     Sig: Inject 0.3 mLs into the muscle once for 1 dose DUE FOR BOOSTER, PLEASE LET PT KNOW WHEN READY     Dispense:  0.3 mL     Refill:  0       Orders Placed This Encounter   Procedures    [facility] DIGITAL SCREEN BILATERAL     Standing Status:   Future     Standing Expiration Date:   6/28/2023    DEXA BONE DENSITY AXIAL SKELETON     Standing Status:   Future     Standing Expiration Date:   4/28/2023    Brain Natriuretic Peptide     Standing Status:   Future     Standing Expiration Date:   4/28/2023    CBC with Auto Differential     Standing Status:   Future     Standing Expiration Date:   4/28/2023    Comprehensive Metabolic Panel     Standing Status:   Future     Standing Expiration Date:   6/25/2022    Magnesium     Standing Status:   Future     Standing Expiration Date:   4/28/2023    Phosphorus     Standing Status:   Future     Standing Expiration Date:   4/28/2023    Uric Acid     Standing Status:   Future     Standing Expiration Date:   4/28/2023       Medications Discontinued During This Encounter   Medication Reason    mupirocin (BACTROBAN) 2 % cream DUPLICATE    vitamin D (ERGOCALCIFEROL) 1.25 MG (93480 UT) CAPS capsule REORDER         On this date 4/28/2022 I have spent 35 minutes reviewing previous notes, test results and face to face with the patient discussing the diagnosis and importance of compliance with the treatment plan as well as documenting on the day of the visit. This note was completed by using the assistance of a speech-recognition program. However, inadvertent computerized transcription errors may be present. Although every effort was made to ensure accuracy, no guarantees can be provided that every mistake has been identified and corrected by editing. An electronic signature was used to authenticate this note.   Electronically signed by Ana Lai MD on 5/1/2022 at 4:59 PM

## 2022-04-29 ENCOUNTER — OFFICE VISIT (OUTPATIENT)
Dept: GASTROENTEROLOGY | Age: 72
End: 2022-04-29
Payer: MEDICARE

## 2022-04-29 VITALS — SYSTOLIC BLOOD PRESSURE: 132 MMHG | DIASTOLIC BLOOD PRESSURE: 70 MMHG | HEART RATE: 58 BPM | OXYGEN SATURATION: 100 %

## 2022-04-29 DIAGNOSIS — K92.2 GASTROINTESTINAL HEMORRHAGE, UNSPECIFIED GASTROINTESTINAL HEMORRHAGE TYPE: Primary | ICD-10-CM

## 2022-04-29 PROCEDURE — G8427 DOCREV CUR MEDS BY ELIG CLIN: HCPCS | Performed by: INTERNAL MEDICINE

## 2022-04-29 PROCEDURE — 4040F PNEUMOC VAC/ADMIN/RCVD: CPT | Performed by: INTERNAL MEDICINE

## 2022-04-29 PROCEDURE — 99213 OFFICE O/P EST LOW 20 MIN: CPT | Performed by: INTERNAL MEDICINE

## 2022-04-29 PROCEDURE — 1036F TOBACCO NON-USER: CPT | Performed by: INTERNAL MEDICINE

## 2022-04-29 PROCEDURE — G8417 CALC BMI ABV UP PARAM F/U: HCPCS | Performed by: INTERNAL MEDICINE

## 2022-04-29 PROCEDURE — G8400 PT W/DXA NO RESULTS DOC: HCPCS | Performed by: INTERNAL MEDICINE

## 2022-04-29 PROCEDURE — 3017F COLORECTAL CA SCREEN DOC REV: CPT | Performed by: INTERNAL MEDICINE

## 2022-04-29 PROCEDURE — 1123F ACP DISCUSS/DSCN MKR DOCD: CPT | Performed by: INTERNAL MEDICINE

## 2022-04-29 PROCEDURE — 1090F PRES/ABSN URINE INCON ASSESS: CPT | Performed by: INTERNAL MEDICINE

## 2022-04-29 ASSESSMENT — ENCOUNTER SYMPTOMS
ANAL BLEEDING: 0
CONSTIPATION: 0
NAUSEA: 0
ABDOMINAL PAIN: 1
SHORTNESS OF BREATH: 0
BLOOD IN STOOL: 0
BACK PAIN: 0
COUGH: 0
SORE THROAT: 0
DIARRHEA: 0
RECTAL PAIN: 0
CHOKING: 0
VOICE CHANGE: 0
ABDOMINAL DISTENTION: 1
TROUBLE SWALLOWING: 0
VOMITING: 0

## 2022-04-29 NOTE — PROGRESS NOTES
GI OFFICE FOLLOW UP    INTERVAL HISTORY:   No referring provider defined for this encounter. Chief Complaint   Patient presents with    Follow-up     Patient is here today to f/u on EGD/Colonoscopy performed by Dr. Caroline Bustillo       1. Gastrointestinal hemorrhage, unspecified gastrointestinal hemorrhage type              HISTORY OF PRESENT ILLNESS:   Recently patient was admitted in the hospital   With a significant anemia. During that hospitalization, patient had prolonged stay with multiple medical issues. No significant abnormalities noted that can account for her anemia. At present patient is doing reasonably well. She denies GI symptoms. Tolerating diet well. Bowel movements satisfactory. No melena or hematochezia. To be noted that this patient has multiple medical issues and  Other comorbidities. .  Patient is referred for capsule study of the small bowel. Past Medical,Family, and Social History reviewed and does contribute to the patient presenting condition. Patient's PMH/PSH,SH,PSYCH Hx, MEDs, ALLERGIES, and ROS were all reviewed and updated in the appropriate sections.  Yes      PAST MEDICAL HISTORY:  Past Medical History:   Diagnosis Date    Acute CVA (cerebrovascular accident) ([facility] 7/25/2020    Altered mental status 5/26/2021    Arthritis     Brain mass     Cellulitis and abscess     Cellulitis and abscess of unspecified site     Cellulitis of both lower extremities 5/26/2021    Cellulitis of left lower extremity 7/26/2020    Cellulitis of lower extremity 10/4/2020    CHF (congestive heart failure) (HCC)     Chronic kidney disease, unspecified     Coronary atherosclerosis of native coronary artery     Essential hypertension 7/25/2020    Essential hypertension, malignant     Hallucinations 2/18/2021    Hard of hearing     Head contusion 9/9/2020    Hypokalemia 9/9/2020    Iron deficiency anemia, unspecified     Meningioma ([facility] 2/25/2021    [facility]     Other and unspecified hyperlipidemia     Pure hypercholesterolemia     Toxic metabolic encephalopathy 3/73/9291    Type II or unspecified type diabetes mellitus without mention of complication, not stated as uncontrolled     Unspecified asthma(493.90)     Unspecified venous (peripheral) insufficiency     Unspecified vitamin D deficiency     UTI (urinary tract infection) 2/18/2021       Past Surgical History:   Procedure Laterality Date    COLONOSCOPY N/A 3/15/2022    COLONOSCOPY DIAGNOSTIC performed by Dang Law MD at [facility]      x 3, Dr. Violet Harding  3/7/2022         THORACENTESIS  3/10/2022         TONSILLECTOMY AND ADENOIDECTOMY      UPPER GASTROINTESTINAL ENDOSCOPY N/A 3/15/2022    EGD BIOPSY performed by Dang Law MD at [address] [place] [address] Street:    Current Outpatient Medications:     saline nasal gel (AYR) GEL, by Nasal route as needed for Congestion For nose bleeds, 2-3 times a day intranasal prn, Disp: 14.1 g, Rfl: 3    vitamin D (ERGOCALCIFEROL) 1.25 MG (15424 UT) CAPS capsule, Take 1 capsule by mouth once a week Sundays, Disp: 12 capsule, Rfl: 0    saline nasal gel (AYR) GEL, by Nasal route as needed for Congestion (and nose bleeds, 2-3 times a day), Disp: 14.5 g, Rfl: 3    amiodarone (CORDARONE) 200 MG tablet, Take 1 tablet by mouth daily, Disp: 90 tablet, Rfl: 0    mupirocin (BACTROBAN) 2 % cream, Apply 3 times daily for 10 days for open blisters on the legs to prevent cellulitis, Disp: 30 g, Rfl: 3    ferrous sulfate (IRON 325) 325 (65 Fe) MG tablet, Take 1 tablet by mouth daily (with breakfast), Disp: 180 tablet, Rfl: 1    apixaban (ELIQUIS) 2.5 MG TABS tablet, Take 1 tablet by mouth 2 times daily, Disp: 60 tablet, Rfl: 0    Nebulizers (COMPRESSOR/NEBULIZER) MISC, Nebulizer with compressor. Disposable Med Nebs 2 /month. Reusable Med Nebs 1 per 6 months. Aerosol Mask 1 per month. Replacement Filters 2 per month. All other related supplies as needed per month. Medications being used: Albuterol . 083 unit dose vial. Frequency: every 6 hrs x 4/day . Length of Need: 1, Disp: 1 each, Rfl: 3    albuterol (PROVENTIL) (2.5 MG/3ML) 0.083% nebulizer solution, Take 3 mLs by nebulization every 6 hours as needed for Wheezing or Shortness of Breath, Disp: 125 each, Rfl: 0    albuterol sulfate HFA (PROAIR HFA) 108 (90 Base) MCG/ACT inhaler, Inhale 2 puffs into the lungs every 6 hours as needed for Wheezing or Shortness of Breath (cough), Disp: 8 g, Rfl: 3    Misc.  Devices (ADJUST FOLD CANE/YORK HANDLE) MISC, Use daily for walking, Disp: 1 each, Rfl: 0    Handicap Placard MISC, by Does not apply route Expires on 12/30/25, Disp: 1 each, Rfl: 0    allopurinol (ZYLOPRIM) 300 MG tablet, Take 1 tablet by mouth daily Per rheumatologist, Disp: 90 tablet, Rfl: 3    atorvastatin (LIPITOR) 40 MG tablet, Take 1 tablet by mouth once daily, Disp: 90 tablet, Rfl: 3    bumetanide (BUMEX) 1 MG tablet, Take 1 tablet by mouth 2 times daily, Disp: 180 tablet, Rfl: 3    chlorhexidine (HIBICLENS) 4 % external liquid, for decontamination AND WASH LEGS EVERY DAY, Disp: 946 mL, Rfl: 2    desloratadine (CLARINEX) 5 MG tablet, Take 1 tablet by mouth once daily, Disp: 90 tablet, Rfl: 3    FreeStyle Lancets MISC, 1 each by Does not apply route daily Patient needs to contact office before any further refills will be approved, Disp: 90 each, Rfl: 3    magnesium oxide (MAG-OX) 400 (241.3 Mg) MG TABS tablet, Take 1 tablet by mouth twice daily, Disp: 180 tablet, Rfl: 3    metoprolol tartrate (LOPRESSOR) 50 MG tablet, Take 1 tablet by mouth 2 times daily, Disp: 180 tablet, Rfl: 3    miconazole (MICOTIN) 2 % powder, Apply topically 2 times daily UNDER THE SKIN FOLDS LONG TERM, Disp: 45 g, Rfl: 1    Multiple Vitamins-Minerals (THERAPEUTIC MULTIVITAMIN-MINERALS) tablet, Take 1 tablet by mouth daily, Disp: 90 tablet, Rfl: 3    Bismuth Tribromoph-Petrolatum (XEROFORM PETROLATUM PATCH 2\"X2\") PADS external pads, Apply 1 each topically daily, Disp: 30 each, Rfl: 2    blood glucose test strips (FREESTYLE LITE) strip, 1 each by In Vitro route daily As needed. , Disp: 100 each, Rfl: 3    Blood Pressure KIT, 1 kit by Does not apply route three times daily, Disp: 1 kit, Rfl: 0    blood glucose monitor kit and supplies, 1 kit by Other route three times daily Dispense Butterfly Elite CBG Device, Disp: 1 kit, Rfl: 0    ALLERGIES:   Allergies   Allergen Reactions    Latex Rash    Aleve [Naproxen Sodium]      Chronic kidney disease stage III, CHF    Pioglitazone Other (See Comments)     Congestive heart failure    Claritin [Loratadine]     Keflex [Cephalexin]     Lisinopril      Needs clarification of contraindication       FAMILY HISTORY:       Problem Relation Age of Onset    Diabetes Mother     Heart Disease Mother     Heart Disease Father     Diabetes Sister     High Blood Pressure Sister     Diabetes Maternal Grandmother          SOCIAL HISTORY:   Social History     Socioeconomic History    Marital status:       Spouse name: Not on file    Number of children: Not on file    Years of education: Not on file    Highest education level: Not on file   Occupational History    Not on file   Tobacco Use    Smoking status: Former Smoker     Packs/day: 0.25     Years: 10.00     Pack years: 2.50     Quit date: 2000     Years since quittin.3    Smokeless tobacco: Never Used   Vaping Use    Vaping Use: Never used   Substance and Sexual Activity    Alcohol use: Not Currently     Alcohol/week: 0.0 standard drinks    Drug use: No    Sexual activity: Not on file   Other Topics Concern    Not on file   Social History Narrative    Not on file     Social Determinants of Health     Financial Resource Strain: Low Risk     Difficulty of Paying Living Expenses: Not very hard   Food Insecurity: No Food Insecurity    Worried About Running Out of Food in the Last Year: Never true    Ran Out of Food in the Last Year: Never true   Transportation Needs:     Lack of Transportation (Medical): Not on file    Lack of Transportation (Non-Medical): Not on file   Physical Activity:     Days of Exercise per Week: Not on file    Minutes of Exercise per Session: Not on file   Stress:     Feeling of Stress : Not on file   Social Connections:     Frequency of Communication with Friends and Family: Not on file    Frequency of Social Gatherings with Friends and Family: Not on file    Attends Confucianist Services: Not on file    Active Member of [address] [place] [address] or Organizations: Not on file    Attends Club or Organization Meetings: Not on file    Marital Status: Not on file   Intimate Partner Violence:     Fear of Current or Ex-Partner: Not on file    Emotionally Abused: Not on file    Physically Abused: Not on file    Sexually Abused: Not on file   Housing Stability:     Unable to Pay for Housing in the Last Year: Not on file    Number of [place] in the Last Year: Not on file    Unstable Housing in the Last Year: Not on file         REVIEW OF SYSTEMS:         Review of Systems   Constitutional: Negative for appetite change, fatigue and unexpected weight change. HENT: Negative for dental problem, sore throat, trouble swallowing and voice change. Eyes: Negative for visual disturbance (glasses). Respiratory: Negative for cough (COPD), choking and shortness of breath. Cardiovascular: Positive for leg swelling. Negative for chest pain. Gastrointestinal: Positive for abdominal distention and abdominal pain. Negative for anal bleeding, blood in stool, constipation, diarrhea, nausea, rectal pain and vomiting. Genitourinary: Negative for difficulty urinating. Musculoskeletal: Negative for back pain, joint swelling and myalgias.    Neurological: Positive for headaches. Negative for dizziness, tremors, weakness, light-headedness and numbness. Hematological: Does not bruise/bleed easily. Psychiatric/Behavioral: Negative for sleep disturbance. The patient is not nervous/anxious. PHYSICAL EXAMINATION:   Patient is in the wheelchair. She is awake, alert and oriented. Cardiovascular pulmonary exams nonspecific. Lungs expands poorly. Abdominal examination benign. Extremities negative edema. Vital signs reviewed per the nursing documentation. /70   Pulse 58   SpO2 100%   There is no height or weight on file to calculate BMI. Physical Exam      LABORATORY DATA: Reviewed  Lab Results   Component Value Date    WBC 7.0 04/28/2022    HGB 12.1 04/28/2022    HCT 38.0 04/28/2022    MCV 95.5 04/28/2022     04/28/2022     04/28/2022    K 4.2 04/28/2022     04/28/2022    CO2 24 04/28/2022    BUN 83 (H) 04/28/2022    CREATININE 1.44 (H) 04/28/2022    LABPROT 6.9 07/20/2012    LABALBU 3.1 (L) 04/28/2022    BILITOT 0.42 04/28/2022    ALKPHOS 164 (H) 04/28/2022    AST 23 04/28/2022    ALT 12 04/28/2022    INR 1.3 02/06/2022         Lab Results   Component Value Date    RBC 3.98 (L) 04/28/2022    HGB 12.1 04/28/2022    MCV 95.5 04/28/2022    MCH 30.5 04/28/2022    MCHC 31.9 04/28/2022    RDW 18.6 (H) 04/28/2022    MPV 7.3 04/28/2022    BASOPCT 1 04/28/2022    LYMPHSABS 1.40 04/28/2022    MONOSABS 0.60 04/28/2022    NEUTROABS 4.70 04/28/2022    EOSABS 0.10 04/28/2022    BASOSABS 0.00 04/28/2022         DIAGNOSTIC TESTING:     PROCEDURE NOTE     DATE OF PROCEDURE: 3/15/2022     SURGEON: Scotty Lynn MD  Facility :  [facility]  ASSISTANT: None  Anesthesia: mac   PREOPERATIVE DIAGNOSIS:   Iron deficiency anemia     POSTOPERATIVE DIAGNOSIS: as described below     OPERATION: Total colonoscopy      ANESTHESIA: Moderate Sedation     ESTIMATED BLOOD LOSS: less than 50      COMPLICATIONS: None.      SPECIMENS:  Was Not Obtained     HISTORY: The patient is a 66y.o. year old female with history of above preop diagnosis. I recommended colonoscopy with possible biopsy or polypectomy and I explained the risk, benefits, expected outcome, and alternatives to the procedure. Risks included but are not limited to bleeding, infection, respiratory distress, hypotension, and perforation of the colon and possibility of missing a lesion. The patient understands and is in agreement.         The patient was counseled at length about the risks of rae Covid-19 during their perioperative period and any recovery window from their procedure.  The patient was made aware that rae Covid-19  may worsen their prognosis for recovering from their procedure  and lend to a higher morbidity and/or mortality risk.  All material risks, benefits, and reasonable alternatives including postponing the procedure were discussed. The patient does wish to proceed with the procedure at this time.        PROCEDURE: The patient was given IV conscious sedation. The patient's SPO2 remained above 90% throughout the procedure.      The colonoscope was inserted per rectum and advanced under direct vision to the cecum without difficulty.       Post sedation note : The patient's SPO2 remained above 90% throughout the procedure. the vital signs remained stable , and no immediate complication form the procedure noted, patient will be ready for d/c when criteria is met .           The prep was good.       Findings:  Terminal ileum: normal     Cecum/Ascending colon: normal     Transverse colon: normal     Descending/Sigmoid colon: abnormal: Diverticulosis     Rectum/Anus: examined in normal and retroflexed positions and was abnormal: 1 spot of hemorrhagic looks like trauma from probably enema ? ?     Withdrawal Time was (minutes): 10     The colon was decompressed and the scope was removed.   The patient tolerated the procedure well.      Recommendations/Plan: 1. Capsule endoscopy as an outpatient  2. H&H and iron monitoring with infusion/transfusion  3. Restart anticoagulation with close monitoring     Electronically signed by Daksha Adkins MD  on 3/15/2022 at 1:46 PM    PROCEDURE NOTE     DATE OF PROCEDURE: 3/15/2022      SURGEON: Daksha Adkins MD  Facility: [facility]  ASSISTANT: None  Anesthesia: MAC  PREOPERATIVE DIAGNOSIS:      Anemia  Unexplained melena     Diagnosis:  Biopsies were taken from the small bowel randomly        POSTOPERATIVE DIAGNOSIS: As described below     OPERATION: Upper GI endoscopy with Biopsy     ANESTHESIA: Moderate Sedation      ESTIMATED BLOOD LOSS: Less than 50 ml     COMPLICATIONS: None.      SPECIMENS:  Was Obtained: as above      HISTORY: The patient is a 66y.o. year old female with history of above preop diagnosis. I recommended esophagogastroduodenoscopy with possible biopsy and I explained the risk, benefits, expected outcome, and alternatives to the procedure. Risks included but are not limited to bleeding, infection, respiratory distress, hypotension, and perforation of the esophagus, stomach, or duodenum. Patient understands and is in agreement.        The patient was counseled at length about the risks of rae Covid-19 during their perioperative period and any recovery window from their procedure.  The patient was made aware that rae Covid-19  may worsen their prognosis for recovering from their procedure  and lend to a higher morbidity and/or mortality risk.  All material risks, benefits, and reasonable alternatives including postponing the procedure were discussed. The patient does wish to proceed with the procedure at this time.           PROCEDURE: The patient was given IV conscious sedation. The patient's SPO2 remained above 90% throughout the procedure. The gastroscope was inserted orally and advanced under direct vision through the esophagus, through the stomach, through the pylorus, and into the descending duodenum.       Post sedation note : The patient's SPO2 remained above 90% throughout the procedure. the vital signs remained stable , and no immediate complication form the procedure noted, patient will be ready for d/c when criteria is met .        Findings:     Retropharyngeal area was grossly normal appearing     Esophagus: normal     Stomach:    Fundus: normal    Body: normal    Antrum: normal     Duodenum:     Descending: abnormal: Biopsies were taken from the small bowel randomly    Bulb: abnormal: Biopsies were taken from the small bowel randomly     The scope was removed and the patient tolerated the procedure well.      Recommendations/Plan:   1. F/U Biopsies  2. F/U In Office in 3-4 weeks  3. Discussed with the family     Electronically signed by Scotty Lynn MD  on 3/15/2022 at 1:32 PM     Surgical Pathology Report -- Diagnosis --   SMALL BOWEL, BIOPSIES:   - NORMAL SMALL INTESTINAL MUCOSA.        Benny Lucio M.D.   **Electronically Signed Out**         sls/3/17/2022         Clinical Information   Pre-Op Diagnosis:  IRON DEFICIENCY ANEMIA   Operative Findings:  SMALL BOWEL BIOPSY R/O CELIAC DISEASE   Operation Performed:  EGD BIOPSY, COLONOSCOPY DIAGNOSTIC     Source of Specimen   A: SMALL BOWEL BIOPSY R/O CELIAC DISEASE     Gross Description   \"CASS OMAR, SMALL BOWEL BIOPSY\" Three tan-white tissue fragments   from 0.3 to 0.4 cm and are 1.0 x 0.2 x 0.2 cm in aggregate.  Entirely   1cs.  yr tm       Microscopic Description   The villous architecture is normal.  Intraepithelial lymphocytes are   not increased.  There is no evidence of active inflammation,   granulomatous inflammation, ulcer, dysplasia or neoplasia.      SURGICAL PATHOLOGY CONSULTATION         Patient Name: Kristi Bellamy: [id number]   Path Number: [id number]     [address],  [address] [place], [address]   [phone] Fax: [phone]                Assessment  1. Gastrointestinal hemorrhage, unspecified gastrointestinal hemorrhage type        Plan  During her recent hospitalization, patient had a significant anemia. Yesterday her hemoglobin reported to be 12.1. This is a significant improvement. Patient has been on the Eliquis which may be contributing to her anemia. She has a multiple other medical issues being followed by other physicians. She may need capsule study of the small bowel this will be arranged in the next 2 to 3 weeks. Discussed with the patient and daughter was present with the patient regarding capsule study, they understood and agreed. Thank you for allowing me to participate in the care of Ms. Ojeda. For any further questions please do not hesitate to contact me. I have reviewed and agree with the ROS entered by the MA/LPN. Note is dictated utilizing voice recognition software. Unfortunately this leads to occasional typographical errors.  Please contact our office if you have any questions        Salvatore Hernandez MD,FACP, [facility]  Board Certified in Gastroenterology and [address] [place] [address] Gastroenterology  Office #: [phone]

## 2022-05-01 ENCOUNTER — TELEPHONE (OUTPATIENT)
Dept: FAMILY MEDICINE CLINIC | Age: 72
End: 2022-05-01

## 2022-05-01 PROBLEM — R04.0 BLEEDING NOSE: Status: ACTIVE | Noted: 2022-05-01

## 2022-05-01 PROBLEM — R63.4 UNINTENDED WEIGHT LOSS: Status: ACTIVE | Noted: 2022-05-01

## 2022-05-01 ASSESSMENT — ENCOUNTER SYMPTOMS
BLOOD IN STOOL: 0
SHORTNESS OF BREATH: 1

## 2022-05-01 NOTE — TELEPHONE ENCOUNTER
Please let the daughter know and patient know that the appointment on 6/16/ 2022 can be canceled  To keep next 2 appointments  Future Appointments   Date Time Provider Angela Reyez   5/5/2022  2:00 PM MAL Pack - CNP [facility]   5/19/2022  1:00 PM [facility] VASCULAR [address]   5/19/2022  2:00  [facility] VASCULAR RM [address]   5/25/2022  2:15 PM Kalee Helms MD [address] [place] [address]   6/16/2022  2:30 PM MD mary Christine   7/21/2022  4:15 PM MD mary Christine   11/25/2022  3:30 PM MD mary Christine

## 2022-05-02 NOTE — TELEPHONE ENCOUNTER
PT WAS AWARE OF THIS WHEN SHE CHECKED OUT WITH MOTHER DAY OF LAST APPT. I ALSO DID CALL TO CONFIRM ONCE AGAIN THAT PT AND HER DAUGHTER UNDERSTOOD.     SAM

## 2022-05-03 ENCOUNTER — TELEPHONE (OUTPATIENT)
Dept: GASTROENTEROLOGY | Age: 72
End: 2022-05-03

## 2022-05-03 NOTE — TELEPHONE ENCOUNTER
Writer spoke with Dr. Cornelia Coleman and he advise that the patient apply the stool and place it in an envelope and bring it back to the office for him to test it. Writer called the patient's daughter and give her the instructions. Caller verbalized understanding and thanked the writer.

## 2022-05-03 NOTE — TELEPHONE ENCOUNTER
pts daughter called and stated Dr. Rakan Vilchis sent pt home with a hemoccult test but was unsure what she was supposed to use to do it. Please call daughter Leonides Palmer @ [phone] and advise.

## 2022-05-05 ENCOUNTER — HOSPITAL ENCOUNTER (OUTPATIENT)
Dept: WOUND CARE | Age: 72
Discharge: HOME | End: 2022-05-05
Payer: MEDICARE

## 2022-05-05 VITALS
HEART RATE: 61 BPM | WEIGHT: 136.91 LBS | TEMPERATURE: 97 F | SYSTOLIC BLOOD PRESSURE: 183 MMHG | HEIGHT: 59 IN | RESPIRATION RATE: 18 BRPM | BODY MASS INDEX: 27.6 KG/M2 | DIASTOLIC BLOOD PRESSURE: 64 MMHG

## 2022-05-05 DIAGNOSIS — L97.922 LEG ULCER, LEFT, WITH FAT LAYER EXPOSED (HCC): ICD-10-CM

## 2022-05-05 DIAGNOSIS — I87.2 VENOUS INSUFFICIENCY OF BOTH LOWER EXTREMITIES: ICD-10-CM

## 2022-05-05 DIAGNOSIS — L97.912 LEG ULCER, RIGHT, WITH FAT LAYER EXPOSED (HCC): ICD-10-CM

## 2022-05-05 DIAGNOSIS — I87.2 CHRONIC VENOUS STASIS DERMATITIS OF BOTH LOWER EXTREMITIES: ICD-10-CM

## 2022-05-05 DIAGNOSIS — R60.0 BILATERAL LOWER EXTREMITY EDEMA: Primary | ICD-10-CM

## 2022-05-05 PROCEDURE — 29580 STRAPPING UNNA BOOT: CPT

## 2022-05-05 PROCEDURE — 99213 OFFICE O/P EST LOW 20 MIN: CPT | Performed by: NURSE PRACTITIONER

## 2022-05-05 RX ORDER — LIDOCAINE HYDROCHLORIDE 40 MG/ML
SOLUTION TOPICAL ONCE
Status: COMPLETED | OUTPATIENT
Start: 2022-05-05 | End: 2022-05-05

## 2022-05-05 RX ORDER — LIDOCAINE HYDROCHLORIDE 40 MG/ML
SOLUTION TOPICAL ONCE
Status: CANCELLED | OUTPATIENT
Start: 2022-05-05 | End: 2022-05-05

## 2022-05-05 RX ORDER — LIDOCAINE HYDROCHLORIDE 20 MG/ML
JELLY TOPICAL ONCE
Status: CANCELLED | OUTPATIENT
Start: 2022-05-05 | End: 2022-05-05

## 2022-05-05 RX ADMIN — LIDOCAINE HYDROCHLORIDE 5 ML: 40 SOLUTION TOPICAL at 15:08

## 2022-05-05 NOTE — PLAN OF CARE
Problem: Chronic Conditions and Co-morbidities  Goal: Patient's chronic conditions and co-morbidity symptoms are monitored and maintained or improved  Outcome: Progressing     Problem: Discharge Planning  Goal: Discharge to home or other facility with appropriate resources  Outcome: Progressing     Problem: Skin/Tissue Integrity - Adult  Goal: Skin integrity remains intact  Outcome: Progressing  Goal: Incisions, wounds, or drain sites healing without S/S of infection  Outcome: Progressing

## 2022-05-05 NOTE — PROGRESS NOTES
[facility] Application   Below Knee    NAME:  Trina Pool  YOB: 1950  MEDICAL RECORD NUMBER:  [id number]  DATE:  5/5/2022     [x] Applied moisturizing agent to dry skin as needed.  [x] Appied primary and secondary dressing as ordered     [x] Applied Unna roll from toes to knee overlapping each time.  [x] Applied ace wrap or coban from toes to below the knee.  [x] Secured with tape and/or metal clips covered with tape.  [x] Instructed patient/caregiver to keep dressing dry and intact. DO NOT REMOVE DRESSING.  [x] Instructed pt/family/caregiver to report excessive draining, loose bandage, wet dressing, severe pain or tingling in toes.  [x] Applied [facility] dressing below the knee to Bilateral lower leg(s)        Unna Boot(s) were applied per  Guidelines. Silvercel to wound bed, bilateral lower leg calamine unna boots.      Electronically signed by Carly Raymond RN on 5/5/2022 at 3:53 PM

## 2022-05-05 NOTE — PROGRESS NOTES
[address]   Progress Note and Procedure Note      Dharmesh Brochure Rusty  MEDICAL RECORD NUMBER:  [id number]  AGE: 66 y.o. GENDER: female  : 1950  EPISODE DATE:  2022    Subjective:     Chief Complaint   Patient presents with    Wound Check     BLE         HISTORY of PRESENT ILLNESS HPI     Rukhsana Agarwal is a 66 y.o. female who presents today for wound/ulcer evaluation. History of Wound Context: here to follow up on bilateral lower leg ulcerations. Left leg is healed today and right leg ulcer is much improved. She is here with her daughter. Did have itching with zinc unna will change to calamine to see if that helps.    Wound/Ulcer Pain Timing/Severity: intermittent  Quality of pain: aching  Severity:  2 / 10   Modifying Factors: None  Associated Signs/Symptoms: none    Ulcer Identification:  Ulcer Type: venous  Contributing Factors: edema, venous stasis and decreased mobility    Wound: N/A        PAST MEDICAL HISTORY        Diagnosis Date    Acute CVA (cerebrovascular accident) ([address] 2020    Altered mental status 2021    Arthritis     Brain mass     Cellulitis and abscess     Cellulitis and abscess of unspecified site     Cellulitis of both lower extremities 2021    Cellulitis of left lower extremity 2020    Cellulitis of lower extremity 10/4/2020    CHF (congestive heart failure) (HCC)     Chronic kidney disease, unspecified     Coronary atherosclerosis of native coronary artery     Essential hypertension 2020    Essential hypertension, malignant     Hallucinations 2021    Hard of hearing     Head contusion 2020    Hypokalemia 2020    Iron deficiency anemia, unspecified     Meningioma ([address] 2021    Myocardial infarction ([address]     Other and unspecified hyperlipidemia     Pure hypercholesterolemia     Toxic metabolic encephalopathy     Type II or unspecified type diabetes mellitus without mention of complication, not stated as uncontrolled     Unspecified asthma(493.90)     Unspecified venous (peripheral) insufficiency     Unspecified vitamin D deficiency     UTI (urinary tract infection) 2021       PAST SURGICAL HISTORY    Past Surgical History:   Procedure Laterality Date    COLONOSCOPY N/A 3/15/2022    COLONOSCOPY DIAGNOSTIC performed by Daksha Adkins MD at [facility] [address]      x 3, Dr. Tiffany Herrera  3/7/2022         THORACENTESIS  3/10/2022         TONSILLECTOMY AND ADENOIDECTOMY      UPPER GASTROINTESTINAL ENDOSCOPY N/A 3/15/2022    EGD BIOPSY performed by Daksha Adkins MD at [address] [place] [address]    Family History   Problem Relation Age of Onset    Diabetes Mother     Heart Disease Mother     Heart Disease Father     Diabetes Sister     High Blood Pressure Sister     Diabetes Maternal Grandmother        SOCIAL HISTORY    Social History     Tobacco Use    Smoking status: Former Smoker     Packs/day: 0.25     Years: 10.00     Pack years: 2.50     Quit date: 2000     Years since quittin.3    Smokeless tobacco: Never Used   Vaping Use    Vaping Use: Never used   Substance Use Topics    Alcohol use: Not Currently     Alcohol/week: 0.0 standard drinks    Drug use: No       ALLERGIES    Allergies   Allergen Reactions    Latex Rash    Aleve [Naproxen Sodium]      Chronic kidney disease stage III, CHF    Pioglitazone Other (See Comments)     Congestive heart failure    Claritin [Loratadine]     Keflex [Cephalexin]     Lisinopril      Needs clarification of contraindication       MEDICATIONS    Current Outpatient Medications on File Prior to Encounter   Medication Sig Dispense Refill    saline nasal gel (AYR) GEL by Nasal route as needed for Congestion For nose bleeds, 2-3 times a day intranasal prn 14.1 g 3    vitamin D (ERGOCALCIFEROL) 1.25 MG (21878 UT) CAPS capsule Take 1 capsule by mouth once a week Sundays 12 capsule 0    saline nasal gel (AYR) GEL by Nasal route as needed for Congestion (and nose bleeds, 2-3 times a day) 14.5 g 3    amiodarone (CORDARONE) 200 MG tablet Take 1 tablet by mouth daily 90 tablet 0    mupirocin (BACTROBAN) 2 % cream Apply 3 times daily for 10 days for open blisters on the legs to prevent cellulitis 30 g 3    ferrous sulfate (IRON 325) 325 (65 Fe) MG tablet Take 1 tablet by mouth daily (with breakfast) 180 tablet 1    apixaban (ELIQUIS) 2.5 MG TABS tablet Take 1 tablet by mouth 2 times daily 60 tablet 0    Nebulizers (COMPRESSOR/NEBULIZER) MISC Nebulizer with compressor. Disposable Med Nebs 2 /month. Reusable Med Nebs 1 per 6 months. Aerosol Mask 1 per month. Replacement Filters 2 per month. All other related supplies as needed per month. Medications being used: Albuterol . 083 unit dose vial. Frequency: every 6 hrs x 4/day . Length of Need: 1 1 each 3    albuterol (PROVENTIL) (2.5 MG/3ML) 0.083% nebulizer solution Take 3 mLs by nebulization every 6 hours as needed for Wheezing or Shortness of Breath 125 each 0    albuterol sulfate HFA (PROAIR HFA) 108 (90 Base) MCG/ACT inhaler Inhale 2 puffs into the lungs every 6 hours as needed for Wheezing or Shortness of Breath (cough) 8 g 3    Misc.  Devices (ADJUST FOLD CANE/YORK HANDLE) MISC Use daily for walking 1 each 0    Handicap Placard MISC by Does not apply route Expires on 12/30/25 1 each 0    allopurinol (ZYLOPRIM) 300 MG tablet Take 1 tablet by mouth daily Per rheumatologist 90 tablet 3    atorvastatin (LIPITOR) 40 MG tablet Take 1 tablet by mouth once daily 90 tablet 3    bumetanide (BUMEX) 1 MG tablet Take 1 tablet by mouth 2 times daily 180 tablet 3    chlorhexidine (HIBICLENS) 4 % external liquid for decontamination AND WASH LEGS EVERY  mL 2    desloratadine (CLARINEX) 5 MG tablet Take 1 tablet by mouth once daily 90 tablet 3    FreeStyle Lancets MISC 1 each by Does not apply route daily Patient needs to contact office before any further refills will be approved 90 each 3    magnesium oxide (MAG-OX) 400 (241.3 Mg) MG TABS tablet Take 1 tablet by mouth twice daily 180 tablet 3    metoprolol tartrate (LOPRESSOR) 50 MG tablet Take 1 tablet by mouth 2 times daily 180 tablet 3    miconazole (MICOTIN) 2 % powder Apply topically 2 times daily UNDER THE SKIN FOLDS LONG TERM 45 g 1    Multiple Vitamins-Minerals (THERAPEUTIC MULTIVITAMIN-MINERALS) tablet Take 1 tablet by mouth daily 90 tablet 3    Bismuth Tribromoph-Petrolatum (XEROFORM PETROLATUM PATCH 2\"X2\") PADS external pads Apply 1 each topically daily 30 each 2    blood glucose test strips (FREESTYLE LITE) strip 1 each by In Vitro route daily As needed. 100 each 3    Blood Pressure KIT 1 kit by Does not apply route three times daily 1 kit 0    blood glucose monitor kit and supplies 1 kit by Other route three times daily Dispense Butterfly Elite CBG Device 1 kit 0     No current facility-administered medications on file prior to encounter.        REVIEW OF SYSTEMS    Constitutional: negative  Eyes: negative  Ears, nose, mouth, throat, and face: negative  Respiratory: negative  Cardiovascular: negative except for lower extremity edema  Gastrointestinal: negative  Genitourinary:negative  Integument/breast: negative except for right lower leg ulceration  Hematologic/lymphatic: negative  Musculoskeletal:negative  Neurological: negative  Behavioral/Psych: negative  Endocrine: negative  Allergic/Immunologic: negative    Objective:      BP (!) 183/64   Pulse 61   Temp 97 °F (36.1 °C) (Tympanic)   Resp 18   Ht 4' 11\" (1.499 m)   Wt 136 lb 14.5 oz (62.1 kg)   BMI 27.65 kg/m²     Wt Readings from Last 3 Encounters:   05/05/22 136 lb 14.5 oz (62.1 kg)   04/28/22 135 lb (61.2 kg)   04/28/22 133 lb (60.3 kg)       PHYSICAL EXAM    General Appearance: alert and oriented to person, place and time, well-developed and well-nourished, in no acute distress  Skin: warm and dry, no rash or erythema, right lower leg ulceration   Head: normocephalic and atraumatic  Eyes: pupils equal, round, extraocular eye movements intact, and conjunctivae normal  Pulmonary/Chest: normal air movement, no respiratory distress  Extremities: no cyanosis and no clubbing   Musculoskeletal: no joint swelling, deformity or tenderness  Neurologic: gait, coordination normal and speech normal      Assessment:     Problem List Items Addressed This Visit     Bilateral lower extremity edema - Primary    Relevant Orders    Initiate Outpatient Wound Care Protocol    Chronic venous stasis dermatitis of both lower extremities    Relevant Orders    Initiate Outpatient Wound Care Protocol    Leg ulcer, left, with fat layer exposed ([address]    Relevant Orders    Initiate Outpatient Wound Care Protocol    Leg ulcer, right, with fat layer exposed ([address]    Relevant Orders    Initiate Outpatient Wound Care Protocol    Venous insufficiency of both lower extremities           Procedure Note  Indications:  Based on my examination of this patient's wound(s)/ulcer(s) today, debridement is not required to promote healing and evaluate the wound base. Wound Measurements:  Wound/Ulcer Descriptions are Pre Debridement except measurements:    Wound 04/20/22 Leg Left;Lateral;Distal #2 left leg  (Active)   Wound Image   04/20/22 1348   Wound Etiology Venous 05/05/22 1502   Dressing Status Dry; Intact 05/05/22 1502   Wound Cleansed Soap and water 04/28/22 1313   Dressing/Treatment Other (comment) 04/28/22 1335   Wound Length (cm) 0 cm 05/05/22 1502   Wound Width (cm) 0 cm 05/05/22 1502   Wound Depth (cm) 0 cm 05/05/22 1502   Wound Surface Area (cm^2) 0 cm^2 05/05/22 1502   Change in Wound Size % (l*w) 100 05/05/22 1502   Wound Volume (cm^3) 0 cm^3 05/05/22 1502   Wound Healing % 100 05/05/22 1502   Post-Procedure Length (cm) 0 cm 05/05/22 1502   Post-Procedure Width (cm) 0 cm 05/05/22 1502   Post-Procedure Depth (cm) 0 cm 05/05/22 1502   Post-Procedure Surface Area (cm^2) 0 cm^2 05/05/22 1502   Post-Procedure Volume (cm^3) 0 cm^3 05/05/22 1502   Wound Assessment Dry 05/05/22 1502   Drainage Amount Moderate 04/28/22 1313   Drainage Description Serous 04/28/22 1313   Odor None 04/28/22 1313   Marian-wound Assessment Blanchable erythema;Edematous 04/28/22 1313   Margins Defined edges 04/28/22 1313   Wound Thickness Description not for Pressure Injury Full thickness 04/28/22 1313   Number of days: 15       Wound 04/20/22 Leg Right;Lateral;Distal #1 (Active)   Wound Image   04/20/22 1348   Wound Etiology Venous 05/05/22 1502   Dressing Status New drainage noted; Old drainage noted 05/05/22 1502   Wound Cleansed Soap and water 05/05/22 1502   Dressing/Treatment Other (comment) 04/28/22 1335   Wound Length (cm) 0.5 cm 05/05/22 1502   Wound Width (cm) 0.5 cm 05/05/22 1502   Wound Depth (cm) 0.1 cm 05/05/22 1502   Wound Surface Area (cm^2) 0.25 cm^2 05/05/22 1502   Change in Wound Size % (l*w) 99.01 05/05/22 1502   Wound Volume (cm^3) 0.025 cm^3 05/05/22 1502   Wound Healing % 99 05/05/22 1502   Post-Procedure Length (cm) 0.5 cm 05/05/22 1502   Post-Procedure Width (cm) 0.5 cm 05/05/22 1502   Post-Procedure Depth (cm) 0.1 cm 05/05/22 1502   Post-Procedure Surface Area (cm^2) 0.25 cm^2 05/05/22 1502   Post-Procedure Volume (cm^3) 0.025 cm^3 05/05/22 1502   Wound Assessment Pink/red;Epithelialization 05/05/22 1502   Drainage Amount Moderate 05/05/22 1502   Drainage Description Serosanguinous 05/05/22 1502   Odor None 05/05/22 1502   Marian-wound Assessment Blanchable erythema;Fragile 05/05/22 1502   Margins Defined edges 05/05/22 1502   Wound Thickness Description not for Pressure Injury Full thickness 05/05/22 1502   Number of days: 15          Plan:     Treatment Note pleasedsee Discharge Instructions    Written patient dismissal instructions given to patient and signed by patient or POA.            Electronically signed by MAL Valente CNP on 5/5/2022 at 3:43 PM

## 2022-05-10 DIAGNOSIS — I73.9 PVD (PERIPHERAL VASCULAR DISEASE) (HCC): Primary | ICD-10-CM

## 2022-05-11 ENCOUNTER — HOSPITAL ENCOUNTER (OUTPATIENT)
Dept: WOUND CARE | Age: 72
Discharge: HOME | End: 2022-05-11
Payer: MEDICARE

## 2022-05-11 VITALS
BODY MASS INDEX: 27.21 KG/M2 | DIASTOLIC BLOOD PRESSURE: 62 MMHG | HEIGHT: 59 IN | RESPIRATION RATE: 20 BRPM | HEART RATE: 71 BPM | TEMPERATURE: 98.2 F | SYSTOLIC BLOOD PRESSURE: 182 MMHG | WEIGHT: 135 LBS

## 2022-05-11 DIAGNOSIS — L97.912 LEG ULCER, RIGHT, WITH FAT LAYER EXPOSED (HCC): ICD-10-CM

## 2022-05-11 DIAGNOSIS — L97.811 NON-PRESSURE CHRONIC ULCER OF OTHER PART OF RIGHT LOWER LEG LIMITED TO BREAKDOWN OF SKIN (HCC): ICD-10-CM

## 2022-05-11 DIAGNOSIS — I73.9 PERIPHERAL VASCULAR DISEASE, UNSPECIFIED (HCC): ICD-10-CM

## 2022-05-11 DIAGNOSIS — I89.0 LYMPHEDEMA: ICD-10-CM

## 2022-05-11 DIAGNOSIS — I87.2 CHRONIC VENOUS STASIS DERMATITIS OF BOTH LOWER EXTREMITIES: ICD-10-CM

## 2022-05-11 DIAGNOSIS — R60.0 BILATERAL LOWER EXTREMITY EDEMA: Primary | ICD-10-CM

## 2022-05-11 PROCEDURE — 29580 STRAPPING UNNA BOOT: CPT

## 2022-05-11 RX ORDER — LIDOCAINE HYDROCHLORIDE 20 MG/ML
JELLY TOPICAL ONCE
Status: CANCELLED | OUTPATIENT
Start: 2022-05-11 | End: 2022-05-11

## 2022-05-11 RX ORDER — LIDOCAINE HYDROCHLORIDE 40 MG/ML
SOLUTION TOPICAL ONCE
Status: COMPLETED | OUTPATIENT
Start: 2022-05-11 | End: 2022-05-11

## 2022-05-11 RX ORDER — LIDOCAINE HYDROCHLORIDE 40 MG/ML
SOLUTION TOPICAL ONCE
Status: CANCELLED | OUTPATIENT
Start: 2022-05-11 | End: 2022-05-11

## 2022-05-11 RX ADMIN — LIDOCAINE HYDROCHLORIDE 10 ML: 40 SOLUTION TOPICAL at 12:58

## 2022-05-11 ASSESSMENT — PAIN SCALES - GENERAL: PAINLEVEL_OUTOF10: 0

## 2022-05-11 NOTE — PROGRESS NOTES
[address]   Progress Note and Procedure Note      Padmini Ojeda  MEDICAL RECORD NUMBER:  [id number]  AGE: 66 y.o. GENDER: female  : 1950  EPISODE DATE:  2022    Subjective:     Chief Complaint   Patient presents with    Wound Check     right lateral leg, left lateral leg         HISTORY of PRESENT ILLNESS HPI     Drea Ramirez is a 66 y.o. female who presents today for wound/ulcer evaluation. History of Wound Context: Jane Carney presents with significant other today for follow-up of bilateral lower leg ulcerations. The large ulceration on the right posterior calf has healed. The left leg wounds have healed. She has non-invasive vascular testing scheduled but has had issues with coverage by insurance. Has done well with the Unna boot and drainage has decreased.     Ulcer Identification:  Ulcer Type: venous and lymphedema  Contributing Factors: edema, venous stasis, lymphedema and decreased mobility          PAST MEDICAL HISTORY        Diagnosis Date    Acute CVA (cerebrovascular accident) ([address] 2020    Altered mental status 2021    Arthritis     Brain mass     Cellulitis and abscess     Cellulitis and abscess of unspecified site     Cellulitis of both lower extremities 2021    Cellulitis of left lower extremity 2020    Cellulitis of lower extremity 10/4/2020    CHF (congestive heart failure) (HCC)     Chronic kidney disease, unspecified     Coronary atherosclerosis of native coronary artery     Essential hypertension 2020    Essential hypertension, malignant     Hallucinations 2021    Hard of hearing     Head contusion 2020    Hypokalemia 2020    Iron deficiency anemia, unspecified     Meningioma ([address] 2021    Myocardial infarction ([address]     Other and unspecified hyperlipidemia     Pure hypercholesterolemia     Toxic metabolic encephalopathy     Type II or unspecified type diabetes mellitus without mention of complication, not stated as uncontrolled     Unspecified asthma(493.90)     Unspecified venous (peripheral) insufficiency     Unspecified vitamin D deficiency     UTI (urinary tract infection) 2021       PAST SURGICAL HISTORY    Past Surgical History:   Procedure Laterality Date    COLONOSCOPY N/A 3/15/2022    COLONOSCOPY DIAGNOSTIC performed by Margaret Salguero MD at [facility] [address]      x 3, Dr. Pond Shoulder  3/7/2022         THORACENTESIS  3/10/2022         TONSILLECTOMY AND ADENOIDECTOMY      UPPER GASTROINTESTINAL ENDOSCOPY N/A 3/15/2022    EGD BIOPSY performed by Margaret Salguero MD at [address] [place] [address]    Family History   Problem Relation Age of Onset    Diabetes Mother     Heart Disease Mother     Heart Disease Father     Diabetes Sister     High Blood Pressure Sister     Diabetes Maternal Grandmother        SOCIAL HISTORY    Social History     Tobacco Use    Smoking status: Former Smoker     Packs/day: 0.25     Years: 10.00     Pack years: 2.50     Quit date: 2000     Years since quittin.3    Smokeless tobacco: Never Used   Vaping Use    Vaping Use: Never used   Substance Use Topics    Alcohol use: Not Currently     Alcohol/week: 0.0 standard drinks    Drug use: No       ALLERGIES    Allergies   Allergen Reactions    Latex Rash    Aleve [Naproxen Sodium]      Chronic kidney disease stage III, CHF    Pioglitazone Other (See Comments)     Congestive heart failure    Claritin [Loratadine]     Keflex [Cephalexin]     Lisinopril      Needs clarification of contraindication       MEDICATIONS    Current Outpatient Medications on File Prior to Encounter   Medication Sig Dispense Refill    saline nasal gel (AYR) GEL by Nasal route as needed for Congestion For nose bleeds, 2-3 times a day intranasal prn 14.1 g 3    vitamin D (ERGOCALCIFEROL) 1.25 MG (00008 UT) CAPS capsule Take 1 capsule by mouth once a week Sundays 12 capsule 0    saline nasal gel (AYR) GEL by Nasal route as needed for Congestion (and nose bleeds, 2-3 times a day) 14.5 g 3    amiodarone (CORDARONE) 200 MG tablet Take 1 tablet by mouth daily 90 tablet 0    mupirocin (BACTROBAN) 2 % cream Apply 3 times daily for 10 days for open blisters on the legs to prevent cellulitis 30 g 3    ferrous sulfate (IRON 325) 325 (65 Fe) MG tablet Take 1 tablet by mouth daily (with breakfast) 180 tablet 1    apixaban (ELIQUIS) 2.5 MG TABS tablet Take 1 tablet by mouth 2 times daily 60 tablet 0    Nebulizers (COMPRESSOR/NEBULIZER) MISC Nebulizer with compressor. Disposable Med Nebs 2 /month. Reusable Med Nebs 1 per 6 months. Aerosol Mask 1 per month. Replacement Filters 2 per month. All other related supplies as needed per month. Medications being used: Albuterol . 083 unit dose vial. Frequency: every 6 hrs x 4/day . Length of Need: 1 1 each 3    albuterol (PROVENTIL) (2.5 MG/3ML) 0.083% nebulizer solution Take 3 mLs by nebulization every 6 hours as needed for Wheezing or Shortness of Breath 125 each 0    albuterol sulfate HFA (PROAIR HFA) 108 (90 Base) MCG/ACT inhaler Inhale 2 puffs into the lungs every 6 hours as needed for Wheezing or Shortness of Breath (cough) 8 g 3    Misc.  Devices (ADJUST FOLD CANE/YORK HANDLE) MISC Use daily for walking 1 each 0    Handicap Placard MISC by Does not apply route Expires on 12/30/25 1 each 0    allopurinol (ZYLOPRIM) 300 MG tablet Take 1 tablet by mouth daily Per rheumatologist 90 tablet 3    atorvastatin (LIPITOR) 40 MG tablet Take 1 tablet by mouth once daily 90 tablet 3    bumetanide (BUMEX) 1 MG tablet Take 1 tablet by mouth 2 times daily 180 tablet 3    chlorhexidine (HIBICLENS) 4 % external liquid for decontamination AND WASH LEGS EVERY  mL 2    desloratadine (CLARINEX) 5 MG tablet Take 1 tablet by mouth once daily 90 tablet 3    FreeStyle Lancets MISC 1 each by Does not apply route daily Patient needs to contact office before any further refills will be approved 90 each 3    magnesium oxide (MAG-OX) 400 (241.3 Mg) MG TABS tablet Take 1 tablet by mouth twice daily 180 tablet 3    metoprolol tartrate (LOPRESSOR) 50 MG tablet Take 1 tablet by mouth 2 times daily 180 tablet 3    miconazole (MICOTIN) 2 % powder Apply topically 2 times daily UNDER THE SKIN FOLDS LONG TERM 45 g 1    Multiple Vitamins-Minerals (THERAPEUTIC MULTIVITAMIN-MINERALS) tablet Take 1 tablet by mouth daily 90 tablet 3    Bismuth Tribromoph-Petrolatum (XEROFORM PETROLATUM PATCH 2\"X2\") PADS external pads Apply 1 each topically daily 30 each 2    blood glucose test strips (FREESTYLE LITE) strip 1 each by In Vitro route daily As needed. 100 each 3    Blood Pressure KIT 1 kit by Does not apply route three times daily 1 kit 0    blood glucose monitor kit and supplies 1 kit by Other route three times daily Dispense Butterfly Elite CBG Device 1 kit 0     No current facility-administered medications on file prior to encounter. REVIEW OF SYSTEMS    Review of Systems   Constitutional: Negative for chills and fever. Skin: Positive for wound. Objective:      BP (!) 182/62   Pulse 71   Temp 98.2 °F (36.8 °C) (Tympanic)   Resp 20   Ht 4' 11\" (1.499 m)   Wt 135 lb (61.2 kg)   BMI 27.27 kg/m²     Wt Readings from Last 3 Encounters:   05/11/22 135 lb (61.2 kg)   05/05/22 136 lb 14.5 oz (62.1 kg)   04/28/22 135 lb (61.2 kg)       Physical Exam:  General:  Alert and oriented x3. In no acute distress. Lower Extremity Physical Exam:    Vascular: DP pulses are nto palpable, Bilateral. PT pulses are not palpable, Bilateral. CFT <3 seconds to all digits, Bilateral.  Pitting edema, Bilateral.  Hair growth is absent to the level of the lower leg, Bilateral.  Papillomatosis of the skin consistent with lymphedema. Neuro: Saph/sural/SP/DP/plantar sensation intact to light touch. Musculoskeletal: EHL/FHL/GS/TA gross motor intact.  Mo[place] deformity is absent. Dermatologic: Open wound present to right pretibial as documented in detail below. Wound base is granular and limited to the dermal layer. Marian-wound skin is atrophic. Negative probe to bone. There is no erythema. There is no purulent drainage. There is no fluctuance or crepitus. Interdigital maceration absent, Bilateral.     Wound 04/20/22 Leg Left;Lateral;Distal #2 left leg  (Active)   Wound Image   04/20/22 1348   Wound Etiology Venous 05/11/22 1251   Dressing Status Dry; Intact 05/11/22 1251   Wound Cleansed Soap and water 05/11/22 1251   Dressing/Treatment Other (comment) 04/28/22 1335   Wound Length (cm) 0 cm 05/11/22 1251   Wound Width (cm) 0 cm 05/11/22 1251   Wound Depth (cm) 0 cm 05/11/22 1251   Wound Surface Area (cm^2) 0 cm^2 05/11/22 1251   Change in Wound Size % (l*w) 100 05/11/22 1251   Wound Volume (cm^3) 0 cm^3 05/11/22 1251   Wound Healing % 100 05/11/22 1251   Post-Procedure Length (cm) 0 cm 05/05/22 1502   Post-Procedure Width (cm) 0 cm 05/05/22 1502   Post-Procedure Depth (cm) 0 cm 05/05/22 1502   Post-Procedure Surface Area (cm^2) 0 cm^2 05/05/22 1502   Post-Procedure Volume (cm^3) 0 cm^3 05/05/22 1502   Wound Assessment Dry 05/11/22 1251   Drainage Amount None 05/11/22 1251   Drainage Description Serous 04/28/22 1313   Odor None 05/11/22 1251   Marian-wound Assessment Blanchable erythema;Edematous 05/11/22 1251   Margins Defined edges 05/11/22 1251   Wound Thickness Description not for Pressure Injury Full thickness 05/11/22 1251   Number of days: 20       Wound 04/20/22 Leg Right;Lateral;Distal #1 (Active)   Wound Image   04/20/22 1348   Wound Etiology Venous 05/11/22 1251   Dressing Status New drainage noted; Old drainage noted 05/11/22 1251   Wound Cleansed Soap and water 05/11/22 1251   Dressing/Treatment Other (comment) 04/28/22 1335   Wound Length (cm) 0.5 cm 05/11/22 1251   Wound Width (cm) 0.5 cm 05/11/22 1251   Wound Depth (cm) 0.1 cm 05/11/22 1251   Wound Surface Area (cm^2) 0.25 cm^2 05/11/22 1251   Change in Wound Size % (l*w) 99.01 05/11/22 1251   Wound Volume (cm^3) 0.025 cm^3 05/11/22 1251   Wound Healing % 99 05/11/22 1251   Post-Procedure Length (cm) 0.5 cm 05/05/22 1502   Post-Procedure Width (cm) 0.5 cm 05/05/22 1502   Post-Procedure Depth (cm) 0.1 cm 05/05/22 1502   Post-Procedure Surface Area (cm^2) 0.25 cm^2 05/05/22 1502   Post-Procedure Volume (cm^3) 0.025 cm^3 05/05/22 1502   Wound Assessment Pink/red;Epithelialization 05/11/22 1251   Drainage Amount Moderate 05/11/22 1251   Drainage Description Serosanguinous 05/11/22 1251   Odor None 05/11/22 1251   Marian-wound Assessment Blanchable erythema;Fragile 05/11/22 1251   Margins Defined edges 05/11/22 1251   Wound Thickness Description not for Pressure Injury Full thickness 05/11/22 1251   Number of days: 20          Assessment:      Active Hospital Problems    Diagnosis Date Noted    Lymphedema [I89.0] 05/11/2022     Priority: Medium    Peripheral vascular disease, unspecified ([facility] [I73.9] 05/11/2022     Priority: Medium    Bilateral lower extremity edema [R60.0] 04/20/2022     Priority: Medium    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 ([facility] [L97.811] 04/20/2022     Priority: Medium    Mild malnutrition ([facility] [facility] [E44.1] 03/08/2022    Chronic venous stasis dermatitis of both lower extremities [I87.2] 02/08/2022    Chronic diastolic CHF (congestive heart failure) ([facility]) [I50.32] 02/20/2020    Stage 3 chronic kidney disease ([facility] [N18.30]     Type 2 diabetes mellitus with stage 3 chronic kidney disease, without long-term current use of insulin ([facility]) [E11.22, N18.30]     Venous insufficiency of both lower extremities [I87.2]        Plan:     Treatment Note please see attached Discharge Instructions    Encouraged to complete non-invasive vascular testing.     Discussed the importance of compression therapy for control of edema and prevention of re-ulceration once healed. Mia Puls wraps have been ordered. They have not yet been contacted by [facility]. Will follow-up to ensure they are ordered. Continue Unna boot for now for control of edema on the RLE. Begin tubi- and ACE on the left now that she is healed. Silvercel to the wound base    Educated on signs and symptoms of infection. Instructed to call clinic immediately or go to ER if signs and symptoms of infection are present. RTC 1 week         Written patient discharge instructions given to patient and signed by patient or POA.       Orders Placed This Encounter   Medications    lidocaine (XYLOCAINE) 4 % external solution     Orders Placed This Encounter   Procedures    Initiate Outpatient Wound Care Protocol     Cleanse wound with saline    If wound contains bioburden or contamination cleanse with wound cleanser or antimicrobial solution     For normal periwound tissue without irritation nor maceration, apply topical skin protectant    For periwound tissue with irritation and/or maceration, apply zinc based product, topical steroid cream/ointment, or equivalent     For wounds with dry firm black eschar and/or without exudate, apply betadine and leave open to air      For wounds with scant/small to no exudate or drainage, apply wound gel, hydrocolloid, polymer, or equivalent and cover with secondary dressing/foam      For wounds with moderate/large exudate or drainage, apply alginate, hydrofiber, polymer, or equivalent and cover with secondary dressing/foam    For wounds with nonviable tissue requiring removal, apply chemical or mechanical debrider and cover with secondary dressing/foam    For wounds with tunneling, dead space, or cavity, fill or pack with strip/gauze/kerlex to fit and cover with secondary dressing/foam    For wounds with adequate granulation or epithelization, apply wound gel, hydrocolloid, polymer, collagen, or transparent film, and cover secondary dry dressing/foam    For wounds that need additional secondary dressing to help pad or control additional drainage/exudates, add foam, absorbent pad or hydrocolloid    For wounds with suspected or known infection, apply antimicrobial mesh and/or antimicrobial alginate/hydrofiber, or antimicrobial solution moistened gauze/kerlex, or equivalent and cover with secondary dressing/foam    Compression Management needed for edema control, apply multilayer compression or tubular garment or equivalent    Offloading Management needed for pressure relief, apply offloading shoe/boot or equivalent     Standing Status:   Standing     Number of Occurrences:   1    VL LOWER EXTREMITY ARTERIAL SEGMENTAL PRESSURES W PPG     Standing Status:   Future     Standing Expiration Date:   5/11/2023          Discharge Instructions         [address] [facility] and HYPERBARIC TREATMENT [facility]  Visit Yahaira Instructions / Physician Orders  DATE:5/11/22     Home Care:     SUPPLIES ORDERED THRU:  none                   DATE LAST SUPPLIED  none     Wound Location:  Right and Left lower legs     Cleanse with: Keep dry and intact      Dressing Orders:  Primary dressing non adherent Silvercel Secondary dressing     and bilat calamine unna boots       secure with           x 30days     Frequency:  Keep dry and intact     Additional Orders: Increase protein to diet (meat, cheese, eggs, fish, peanut butter, nuts and beans)  Multivitamin daily     OFFLOADING []?? YES  TYPE:                  []? ? NA     Weekly wound care visits until determined otherwise.     Antibiotic therapy-wound care related YES []?? NO []?? NA[]? ?     MY CHART []? ?     Smart Device  []? ?      HYPERBARIC TREATMENT-                [id number] #                          Your next appointment with the [address] [place] [address] is in 1 week                                                                                                   (Please note your next appointment above and if you are unable to keep, kindly give a 24 hour notice. Thank you.)  If more than 15 min late we cannot guarantee you will be seen due to clinician schedule  Per Policy, Excessive cancellation will call for dismissal from program.  If you experience any of the following, please call the [address] during business hours:  [phone]     * Increase in Pain  * Temperature over 101  * Increase in drainage from your wound  * Drainage with a foul odor  * Bleeding  * Increase in swelling  * Need for compression bandage changes due to slippage, breakthrough drainage.     If you need medical attention outside of the business hours of the [address] please contact your PCP or go to the nearest emergency room.     The information contained in the After Visit Summary has been reviewed with me, the patient and/or responsible adult, by my health care provider(s). I had the opportunity to ask questions regarding this information. I have elected to receive;      []? ? After Visit Summary  [x]? ? Comprehensive Discharge Instruction        Patient signature______________________________________Date:________  Electronically signed by Rosalie Alaniz DPM on 5/11/2022 at 12:48 PM  Electronically signed by Kristie Velasquez RN on 5/11/2022 at 1:19 PM          Electronically signed by Rosalie Alaniz DPM on 5/11/2022 at 1:31 PM

## 2022-05-11 NOTE — PROGRESS NOTES
[employer] Application   Below Knee    NAME:  Jeferson Sánchez  YOB: 1950  MEDICAL RECORD NUMBER:  [id number]  DATE:  5/11/2022     [x] Applied moisturizing agent to dry skin as needed.  [x] Appied primary and secondary dressing as ordered     [x] Applied Unna roll from toes to knee overlapping each time.  [x] Applied ace wrap or coban from toes to below the knee.  [x] Secured with tape and/or metal clips covered with tape.  [x] Instructed patient/caregiver to keep dressing dry and intact. DO NOT REMOVE DRESSING.  [x] Instructed pt/family/caregiver to report excessive draining, loose bandage, wet dressing, severe pain or tingling in toes.  [x] Applied [employer] dressing below the knee to Bilateral lower leg(s)        Unna Boot(s) were applied per  Guidelines.      Electronically signed by Felipe Hays RN on 5/11/2022 at 1:56 PM

## 2022-05-17 ENCOUNTER — TELEPHONE (OUTPATIENT)
Dept: FAMILY MEDICINE CLINIC | Age: 72
End: 2022-05-17

## 2022-05-17 DIAGNOSIS — N18.32 STAGE 3B CHRONIC KIDNEY DISEASE (HCC): Primary | ICD-10-CM

## 2022-05-17 NOTE — TELEPHONE ENCOUNTER
Please advise daughter I have ordered the urine test, order in the computer placed today. Please advise daughter she also has standing orders from 4/28/2022 a lot of blood work which was not done to do it ASAP, not fasting: She has blood orders for CBC, BMP, CMP, magnesium, phosphorus, uric acid which was not completed yet     Diagnosis Orders   1.  Stage 3b chronic kidney disease ([facility] [address]  Urinalysis with Reflex to Culture

## 2022-05-17 NOTE — TELEPHONE ENCOUNTER
For about 1 week patient been very confused , think some one is steeling from her. Daughter think it can be UTI or blood infection ? Please let daughter know what to do next?

## 2022-05-18 ENCOUNTER — HOSPITAL ENCOUNTER (OUTPATIENT)
Dept: WOUND CARE | Age: 72
Discharge: HOME | End: 2022-05-18
Payer: MEDICARE

## 2022-05-18 ENCOUNTER — HOSPITAL ENCOUNTER (OUTPATIENT)
Age: 72
Discharge: HOME | End: 2022-05-18
Payer: MEDICARE

## 2022-05-18 ENCOUNTER — APPOINTMENT (OUTPATIENT)
Dept: GENERAL RADIOLOGY | Age: 72
End: 2022-05-18
Payer: MEDICARE

## 2022-05-18 ENCOUNTER — HOSPITAL ENCOUNTER (EMERGENCY)
Age: 72
Discharge: HOME | End: 2022-05-18
Attending: STUDENT IN AN ORGANIZED HEALTH CARE EDUCATION/TRAINING PROGRAM
Payer: MEDICARE

## 2022-05-18 VITALS
DIASTOLIC BLOOD PRESSURE: 46 MMHG | RESPIRATION RATE: 18 BRPM | TEMPERATURE: 97.3 F | WEIGHT: 135 LBS | OXYGEN SATURATION: 100 % | BODY MASS INDEX: 27.21 KG/M2 | HEIGHT: 59 IN | SYSTOLIC BLOOD PRESSURE: 164 MMHG | HEART RATE: 68 BPM

## 2022-05-18 VITALS
BODY MASS INDEX: 27.27 KG/M2 | RESPIRATION RATE: 18 BRPM | TEMPERATURE: 97.3 F | SYSTOLIC BLOOD PRESSURE: 188 MMHG | DIASTOLIC BLOOD PRESSURE: 68 MMHG | WEIGHT: 135 LBS | HEART RATE: 59 BPM

## 2022-05-18 DIAGNOSIS — I87.2 CHRONIC VENOUS STASIS DERMATITIS OF BOTH LOWER EXTREMITIES: ICD-10-CM

## 2022-05-18 DIAGNOSIS — N18.30 BENIGN HYPERTENSION WITH CKD (CHRONIC KIDNEY DISEASE) STAGE III (HCC): ICD-10-CM

## 2022-05-18 DIAGNOSIS — L97.912 LEG ULCER, RIGHT, WITH FAT LAYER EXPOSED (HCC): ICD-10-CM

## 2022-05-18 DIAGNOSIS — N18.32 STAGE 3B CHRONIC KIDNEY DISEASE (HCC): ICD-10-CM

## 2022-05-18 DIAGNOSIS — R89.9 ABNORMAL LABORATORY TEST RESULT: Primary | ICD-10-CM

## 2022-05-18 DIAGNOSIS — I50.32 CHRONIC DIASTOLIC CHF (CONGESTIVE HEART FAILURE) (HCC): ICD-10-CM

## 2022-05-18 DIAGNOSIS — R60.0 BILATERAL LOWER EXTREMITY EDEMA: Primary | ICD-10-CM

## 2022-05-18 DIAGNOSIS — E11.22 TYPE 2 DIABETES MELLITUS WITH STAGE 3A CHRONIC KIDNEY DISEASE, WITHOUT LONG-TERM CURRENT USE OF INSULIN (HCC): ICD-10-CM

## 2022-05-18 DIAGNOSIS — I48.0 PAROXYSMAL ATRIAL FIBRILLATION (HCC): ICD-10-CM

## 2022-05-18 DIAGNOSIS — N18.31 TYPE 2 DIABETES MELLITUS WITH STAGE 3A CHRONIC KIDNEY DISEASE, WITHOUT LONG-TERM CURRENT USE OF INSULIN (HCC): ICD-10-CM

## 2022-05-18 DIAGNOSIS — I12.9 BENIGN HYPERTENSION WITH CKD (CHRONIC KIDNEY DISEASE) STAGE III (HCC): ICD-10-CM

## 2022-05-18 LAB
ABSOLUTE EOS #: 0.2 K/UL (ref 0–0.4)
ABSOLUTE LYMPH #: 1 K/UL (ref 1–4.8)
ABSOLUTE MONO #: 0.5 K/UL (ref 0.1–1.3)
ALBUMIN SERPL-MCNC: 3.3 G/DL (ref 3.5–5.2)
ALP BLD-CCNC: 152 U/L (ref 35–104)
ALT SERPL-CCNC: 18 U/L (ref 5–33)
ANION GAP SERPL CALCULATED.3IONS-SCNC: 11 MMOL/L (ref 9–17)
AST SERPL-CCNC: 25 U/L
BACTERIA: NORMAL
BASOPHILS # BLD: 1 % (ref 0–2)
BASOPHILS ABSOLUTE: 0 K/UL (ref 0–0.2)
BILIRUB SERPL-MCNC: 0.25 MG/DL (ref 0.3–1.2)
BILIRUBIN URINE: NEGATIVE
BUN BLDV-MCNC: 62 MG/DL (ref 8–23)
CALCIUM SERPL-MCNC: 9.1 MG/DL (ref 8.6–10.4)
CASTS UA: NORMAL /LPF
CHLORIDE BLD-SCNC: 106 MMOL/L (ref 98–107)
CO2: 26 MMOL/L (ref 20–31)
COLOR: YELLOW
CREAT SERPL-MCNC: 1.7 MG/DL (ref 0.5–0.9)
EOSINOPHILS RELATIVE PERCENT: 3 % (ref 0–4)
EPITHELIAL CELLS UA: NORMAL /HPF
GFR AFRICAN AMERICAN: 36 ML/MIN
GFR NON-AFRICAN AMERICAN: 30 ML/MIN
GFR SERPL CREATININE-BSD FRML MDRD: ABNORMAL ML/MIN/{1.73_M2}
GLUCOSE BLD-MCNC: 100 MG/DL (ref 70–99)
GLUCOSE URINE: NEGATIVE
HCT VFR BLD CALC: 38.1 % (ref 36–46)
HEMOGLOBIN: 12.4 G/DL (ref 12–16)
KETONES, URINE: NEGATIVE
LEUKOCYTE ESTERASE, URINE: NEGATIVE
LYMPHOCYTES # BLD: 17 % (ref 24–44)
MAGNESIUM: 2.3 MG/DL (ref 1.6–2.6)
MCH RBC QN AUTO: 30.9 PG (ref 26–34)
MCHC RBC AUTO-ENTMCNC: 32.6 G/DL (ref 31–37)
MCV RBC AUTO: 94.6 FL (ref 80–100)
MONOCYTES # BLD: 9 % (ref 1–7)
NITRITE, URINE: NEGATIVE
PDW BLD-RTO: 17.7 % (ref 11.5–14.9)
PH UA: 6 (ref 5–8)
PHOSPHORUS: 4.6 MG/DL (ref 2.6–4.5)
PLATELET # BLD: 217 K/UL (ref 150–450)
PMV BLD AUTO: 7 FL (ref 6–12)
POTASSIUM SERPL-SCNC: 4.2 MMOL/L (ref 3.7–5.3)
PRO-BNP: 7095 PG/ML
PROTEIN UA: ABNORMAL
RBC # BLD: 4.02 M/UL (ref 4–5.2)
RBC UA: NORMAL /HPF
SEG NEUTROPHILS: 70 % (ref 36–66)
SEGMENTED NEUTROPHILS ABSOLUTE COUNT: 4.1 K/UL (ref 1.3–9.1)
SODIUM BLD-SCNC: 143 MMOL/L (ref 135–144)
SPECIFIC GRAVITY UA: 1.02 (ref 1–1.03)
TOTAL PROTEIN: 6.2 G/DL (ref 6.4–8.3)
TURBIDITY: CLEAR
URIC ACID: 3.7 MG/DL (ref 2.4–5.7)
URINE HGB: ABNORMAL
UROBILINOGEN, URINE: NORMAL
WBC # BLD: 5.8 K/UL (ref 3.5–11)
WBC UA: NORMAL /HPF

## 2022-05-18 PROCEDURE — 84550 ASSAY OF BLOOD/URIC ACID: CPT

## 2022-05-18 PROCEDURE — 85025 COMPLETE CBC W/AUTO DIFF WBC: CPT

## 2022-05-18 PROCEDURE — 83880 ASSAY OF NATRIURETIC PEPTIDE: CPT

## 2022-05-18 PROCEDURE — 71046 X-RAY EXAM CHEST 2 VIEWS: CPT

## 2022-05-18 PROCEDURE — 83735 ASSAY OF MAGNESIUM: CPT

## 2022-05-18 PROCEDURE — 99213 OFFICE O/P EST LOW 20 MIN: CPT

## 2022-05-18 PROCEDURE — 80053 COMPREHEN METABOLIC PANEL: CPT

## 2022-05-18 PROCEDURE — 99284 EMERGENCY DEPT VISIT MOD MDM: CPT

## 2022-05-18 PROCEDURE — 36415 COLL VENOUS BLD VENIPUNCTURE: CPT

## 2022-05-18 PROCEDURE — 81001 URINALYSIS AUTO W/SCOPE: CPT

## 2022-05-18 PROCEDURE — 84100 ASSAY OF PHOSPHORUS: CPT

## 2022-05-18 PROCEDURE — 93005 ELECTROCARDIOGRAM TRACING: CPT | Performed by: STUDENT IN AN ORGANIZED HEALTH CARE EDUCATION/TRAINING PROGRAM

## 2022-05-18 RX ORDER — LIDOCAINE HYDROCHLORIDE 40 MG/ML
SOLUTION TOPICAL ONCE
Status: DISCONTINUED | OUTPATIENT
Start: 2022-05-18 | End: 2022-05-18

## 2022-05-18 RX ORDER — LIDOCAINE HYDROCHLORIDE 40 MG/ML
SOLUTION TOPICAL ONCE
Status: CANCELLED | OUTPATIENT
Start: 2022-05-18 | End: 2022-05-18

## 2022-05-18 RX ORDER — LIDOCAINE HYDROCHLORIDE 20 MG/ML
JELLY TOPICAL ONCE
Status: CANCELLED | OUTPATIENT
Start: 2022-05-18 | End: 2022-05-18

## 2022-05-18 ASSESSMENT — PAIN SCALES - GENERAL: PAINLEVEL_OUTOF10: 0

## 2022-05-18 NOTE — RESULT ENCOUNTER NOTE
Noted, I spoke Aurora Walker and I LVM that she will need to go to the emergency room due to congestive heart failure. I called back the daughter and I told her that she did not  the phone. She said she will take her mom to [facility].     I gave report to [facility] ED    Future Appointments  5/25/2022  2:15 PM    Ariella Bernard MD          [address]  5/31/2022  1:00 PM    [facility] VASCULAR RM [address]  6/14/2022  2:00 PM    [facility] DIGITAL RM             [facility] MAMMO          [facility] Radiolog  6/14/2022  2:30 PM    [facility] DEXA RM                [facility]          [facility] Radiolog  8/4/2022   2:30 PM    Franky Gamez MD     fp sc               [id number]  11/25/2022 3:30 PM    Franky Gamez MD     fp sc               [address]

## 2022-05-18 NOTE — PROGRESS NOTES
[address]   Progress Note and Procedure Note      Pablo Ojeda  MEDICAL RECORD NUMBER:  [id number]  AGE: 66 y.o. GENDER: female  : 1950  EPISODE DATE:  2022    Subjective:     Chief Complaint   Patient presents with    Wound Check     Bilateral lower legs         HISTORY of PRESENT ILLNESS HPI     Trina Polo is a 66 y.o. female who presents today for wound/ulcer evaluation. Interval history: Lupe's wounds have healed. They did receive the Farrow wraps. History of Wound Context: Mauricio Hernandez presents with significant other today for follow-up of bilateral lower leg ulcerations. The large ulceration on the right posterior calf has healed. The left leg wounds have healed. She has non-invasive vascular testing scheduled but has had issues with coverage by insurance. Has done well with the Unna boot and drainage has decreased.     Ulcer Identification:  Ulcer Type: venous and lymphedema  Contributing Factors: edema, venous stasis, lymphedema and decreased mobility          PAST MEDICAL HISTORY        Diagnosis Date    Acute CVA (cerebrovascular accident) ([address] 2020    Altered mental status 2021    Arthritis     Brain mass     Cellulitis and abscess     Cellulitis and abscess of unspecified site     Cellulitis of both lower extremities 2021    Cellulitis of left lower extremity 2020    Cellulitis of lower extremity 10/4/2020    CHF (congestive heart failure) (HCC)     Chronic kidney disease, unspecified     Coronary atherosclerosis of native coronary artery     Essential hypertension 2020    Essential hypertension, malignant     Hallucinations 2021    Hard of hearing     Head contusion 2020    Hypokalemia 2020    Iron deficiency anemia, unspecified     Meningioma ([address] 2021    Myocardial infarction ([address]     Other and unspecified hyperlipidemia     Pure hypercholesterolemia     Toxic metabolic encephalopathy 1909    Type II or unspecified type diabetes mellitus without mention of complication, not stated as uncontrolled     Unspecified asthma(493.90)     Unspecified venous (peripheral) insufficiency     Unspecified vitamin D deficiency     UTI (urinary tract infection) 2021       PAST SURGICAL HISTORY    Past Surgical History:   Procedure Laterality Date    COLONOSCOPY N/A 3/15/2022    COLONOSCOPY DIAGNOSTIC performed by Td Sutton MD at [facility].      x 3, Dr. Morena Alvarez  3/7/2022         THORACENTESIS  3/10/2022         TONSILLECTOMY AND ADENOIDECTOMY      UPPER GASTROINTESTINAL ENDOSCOPY N/A 3/15/2022    EGD BIOPSY performed by Td Sutton MD at [address] [place] [address]    Family History   Problem Relation Age of Onset    Diabetes Mother     Heart Disease Mother     Heart Disease Father     Diabetes Sister     High Blood Pressure Sister     Diabetes Maternal Grandmother        SOCIAL HISTORY    Social History     Tobacco Use    Smoking status: Former Smoker     Packs/day: 0.25     Years: 10.00     Pack years: 2.50     Quit date: 2000     Years since quittin.3    Smokeless tobacco: Never Used   Vaping Use    Vaping Use: Never used   Substance Use Topics    Alcohol use: Not Currently     Alcohol/week: 0.0 standard drinks    Drug use: No       ALLERGIES    Allergies   Allergen Reactions    Latex Rash    Aleve [Naproxen Sodium]      Chronic kidney disease stage III, CHF    Pioglitazone Other (See Comments)     Congestive heart failure    Claritin [Loratadine]     Keflex [Cephalexin]     Lisinopril      Needs clarification of contraindication       MEDICATIONS    Current Outpatient Medications on File Prior to Encounter   Medication Sig Dispense Refill    saline nasal gel (AYR) GEL by Nasal route as needed for Congestion For nose bleeds, 2-3 times a day intranasal prn 14.1 g 3    vitamin D (ERGOCALCIFEROL) 1.25 MG (14412 UT) CAPS capsule Take 1 capsule by mouth once a week Sundays 12 capsule 0    saline nasal gel (AYR) GEL by Nasal route as needed for Congestion (and nose bleeds, 2-3 times a day) 14.5 g 3    amiodarone (CORDARONE) 200 MG tablet Take 1 tablet by mouth daily 90 tablet 0    ferrous sulfate (IRON 325) 325 (65 Fe) MG tablet Take 1 tablet by mouth daily (with breakfast) 180 tablet 1    apixaban (ELIQUIS) 2.5 MG TABS tablet Take 1 tablet by mouth 2 times daily 60 tablet 0    Nebulizers (COMPRESSOR/NEBULIZER) MISC Nebulizer with compressor. Disposable Med Nebs 2 /month. Reusable Med Nebs 1 per 6 months. Aerosol Mask 1 per month. Replacement Filters 2 per month. All other related supplies as needed per month. Medications being used: Albuterol . 083 unit dose vial. Frequency: every 6 hrs x 4/day . Length of Need: 1 1 each 3    albuterol (PROVENTIL) (2.5 MG/3ML) 0.083% nebulizer solution Take 3 mLs by nebulization every 6 hours as needed for Wheezing or Shortness of Breath 125 each 0    albuterol sulfate HFA (PROAIR HFA) 108 (90 Base) MCG/ACT inhaler Inhale 2 puffs into the lungs every 6 hours as needed for Wheezing or Shortness of Breath (cough) 8 g 3    Misc.  Devices (ADJUST FOLD CANE/YORK HANDLE) MISC Use daily for walking 1 each 0    Handicap Placard MISC by Does not apply route Expires on 12/30/25 1 each 0    allopurinol (ZYLOPRIM) 300 MG tablet Take 1 tablet by mouth daily Per rheumatologist 90 tablet 3    atorvastatin (LIPITOR) 40 MG tablet Take 1 tablet by mouth once daily 90 tablet 3    bumetanide (BUMEX) 1 MG tablet Take 1 tablet by mouth 2 times daily 180 tablet 3    chlorhexidine (HIBICLENS) 4 % external liquid for decontamination AND WASH LEGS EVERY  mL 2    desloratadine (CLARINEX) 5 MG tablet Take 1 tablet by mouth once daily 90 tablet 3    FreeStyle Lancets MISC 1 each by Does not apply route daily Patient needs to contact office before any further refills will be approved 90 each 3    magnesium oxide (MAG-OX) 400 (241.3 Mg) MG TABS tablet Take 1 tablet by mouth twice daily 180 tablet 3    metoprolol tartrate (LOPRESSOR) 50 MG tablet Take 1 tablet by mouth 2 times daily 180 tablet 3    miconazole (MICOTIN) 2 % powder Apply topically 2 times daily UNDER THE SKIN FOLDS LONG TERM 45 g 1    Multiple Vitamins-Minerals (THERAPEUTIC MULTIVITAMIN-MINERALS) tablet Take 1 tablet by mouth daily 90 tablet 3    Bismuth Tribromoph-Petrolatum (XEROFORM PETROLATUM PATCH 2\"X2\") PADS external pads Apply 1 each topically daily 30 each 2    blood glucose test strips (FREESTYLE LITE) strip 1 each by In Vitro route daily As needed. 100 each 3    Blood Pressure KIT 1 kit by Does not apply route three times daily 1 kit 0    blood glucose monitor kit and supplies 1 kit by Other route three times daily Dispense Butterfly Elite CBG Device 1 kit 0     No current facility-administered medications on file prior to encounter. REVIEW OF SYSTEMS    Review of Systems   Constitutional: Negative for chills and fever. Skin: Positive for wound. Objective:      BP (!) 188/68   Pulse 59   Temp 97.3 °F (36.3 °C) (Tympanic)   Resp 18   Wt 135 lb (61.2 kg)   BMI 27.27 kg/m²     Wt Readings from Last 3 Encounters:   05/18/22 135 lb (61.2 kg)   05/11/22 135 lb (61.2 kg)   05/05/22 136 lb 14.5 oz (62.1 kg)       Physical Exam:  General:  Alert and oriented x3. In no acute distress. Lower Extremity Physical Exam:    Vascular: DP pulses are not palpable, Bilateral. PT pulses are not palpable, Bilateral. CFT <3 seconds to all digits, Bilateral.  Pitting edema, Bilateral.  Hair growth is absent to the level of the lower leg, Bilateral.  Papillomatosis of the skin consistent with lymphedema. Neuro: Saph/sural/SP/DP/plantar sensation intact to light touch. Musculoskeletal: EHL/FHL/GS/TA gross motor intact. Gross deformity is absent. Dermatologic: No open wound.   There is no erythema. There is no drainage. There is no fluctuance or crepitus. Interdigital maceration absent, Bilateral.     Wound 04/20/22 Leg Right;Lateral;Distal #1 (Active)   Wound Image   05/18/22 1318   Wound Etiology Venous 05/18/22 1318   Dressing Status Dry 05/18/22 1318   Wound Cleansed Soap and water 05/11/22 1251   Dressing/Treatment Other (comment) 05/11/22 1353   Wound Length (cm) 0 cm 05/18/22 1318   Wound Width (cm) 0 cm 05/18/22 1318   Wound Depth (cm) 0 cm 05/18/22 1318   Wound Surface Area (cm^2) 0 cm^2 05/18/22 1318   Change in Wound Size % (l*w) 100 05/18/22 1318   Wound Volume (cm^3) 0 cm^3 05/18/22 1318   Wound Healing % 100 05/18/22 1318   Post-Procedure Length (cm) 0 cm 05/18/22 1318   Post-Procedure Width (cm) 0 cm 05/18/22 1318   Post-Procedure Depth (cm) 0 cm 05/18/22 1318   Post-Procedure Surface Area (cm^2) 0 cm^2 05/18/22 1318   Post-Procedure Volume (cm^3) 0 cm^3 05/18/22 1318   Wound Assessment Pink/red;Epithelialization 05/11/22 1251   Drainage Amount Moderate 05/11/22 1251   Drainage Description Serosanguinous 05/11/22 1251   Odor None 05/11/22 1251   Marian-wound Assessment Blanchable erythema;Fragile 05/11/22 1251   Margins Defined edges 05/11/22 1251   Wound Thickness Description not for Pressure Injury Full thickness 05/11/22 1251   Number of days: 27          Assessment:      Active Hospital Problems    Diagnosis Date Noted    Lymphedema [I89.0] 05/11/2022     Priority: Medium    Peripheral vascular disease, unspecified ([facility] [I73.9] 05/11/2022     Priority: Medium    Bilateral lower extremity edema [R60.0] 04/20/2022     Priority: Medium       Plan:     Treatment Note please see attached Discharge Instructions    Encouraged to complete non-invasive vascular testing. Discussed the importance of compression therapy for control of edema and prevention of re-ulceration once healed. Nabeel bergeraps have been received.    Begin use daily and while the legs are in the dependent position. Monitor for new wound and call if present. RTC as needed        Written patient discharge instructions given to patient and signed by patient or POA.       Orders Placed This Encounter   Medications    lidocaine (XYLOCAINE) 4 % external solution     Orders Placed This Encounter   Procedures    Initiate Outpatient Wound Care Protocol     Cleanse wound with saline    If wound contains bioburden or contamination cleanse with wound cleanser or antimicrobial solution     For normal periwound tissue without irritation nor maceration, apply topical skin protectant    For periwound tissue with irritation and/or maceration, apply zinc based product, topical steroid cream/ointment, or equivalent     For wounds with dry firm black eschar and/or without exudate, apply betadine and leave open to air      For wounds with scant/small to no exudate or drainage, apply wound gel, hydrocolloid, polymer, or equivalent and cover with secondary dressing/foam      For wounds with moderate/large exudate or drainage, apply alginate, hydrofiber, polymer, or equivalent and cover with secondary dressing/foam    For wounds with nonviable tissue requiring removal, apply chemical or mechanical debrider and cover with secondary dressing/foam    For wounds with tunneling, dead space, or cavity, fill or pack with strip/gauze/kerlex to fit and cover with secondary dressing/foam    For wounds with adequate granulation or epithelization, apply wound gel, hydrocolloid, polymer, collagen, or transparent film, and cover secondary dry dressing/foam    For wounds that need additional secondary dressing to help pad or control additional drainage/exudates, add foam, absorbent pad or hydrocolloid    For wounds with suspected or known infection, apply antimicrobial mesh and/or antimicrobial alginate/hydrofiber, or antimicrobial solution moistened gauze/kerlex, or equivalent and cover with secondary dressing/foam    Compression Management needed for edema control, apply multilayer compression or tubular garment or equivalent    Offloading Management needed for pressure relief, apply offloading shoe/boot or equivalent     Standing Status:   Standing     Number of Occurrences:   1          Discharge Instructions         [address] and HYPERBARIC TREATMENT [facility]  Visit  Discharge Instructions / Physician Orders  DATE:5/18/22     Home Care:     SUPPLIES ORDERED THRU:  none                   DATE LAST SUPPLIED  none     Wound Location:  Right and Left lower legs-healed     Cleanse with:       Dressing Orders:  ace wraps today- then wear velcro compression wraps on during the day- may remove at night     Frequency:       Additional Orders: Increase protein to diet (meat, cheese, eggs, fish, peanut butter, nuts and beans)  Multivitamin daily     OFFLOADING []??? YES  TYPE:                  []? ?? NA     Weekly wound care visits until determined otherwise.     Antibiotic therapy-wound care related YES []??? NO []??? NA[]? ??     MY CHART []???     Smart Device  []???      HYPERBARIC TREATMENT-                TREATMENT #                          Your next appointment with the [address] [place] [address] is call if needed                                                                                                   (Please note your next appointment above and if you are unable to keep, kindly give a 24 hour notice.  Thank you.)  If more than 15 min late we cannot guarantee you will be seen due to clinician schedule  Per Policy, Excessive cancellation will call for dismissal from program.  If you experience any of the following, please call the [address] [place] [address] during business hours:  [phone]     * Increase in Pain  * Temperature over 101  * Increase in drainage from your wound  * Drainage with a foul odor  * Bleeding  * Increase in swelling  * Need for compression bandage changes due to slippage, breakthrough drainage.     If you need medical attention outside of the business hours of the [address] [place] [address] Road please contact your PCP or go to the nearest emergency room.     The information contained in the After Visit Summary has been reviewed with me, the patient and/or responsible adult, by my health care provider(s). I had the opportunity to ask questions regarding this information. I have elected to receive;      []? ? ? After Visit Summary  [x]? ?? Comprehensive Discharge Instruction        Patient signature______________________________________Date:________  Electronically signed by Nancy Ruffin DPM on 5/18/2022 at 12:45 PM  Electronically signed by Dhruv Tamayo RN on 5/18/2022 at 1:37 PM          Electronically signed by Nancy Ruffin DPM on 5/18/2022 at 1:42 PM

## 2022-05-19 ENCOUNTER — TELEPHONE (OUTPATIENT)
Dept: FAMILY MEDICINE CLINIC | Age: 72
End: 2022-05-19

## 2022-05-19 ENCOUNTER — TELEPHONE (OUTPATIENT)
Dept: GASTROENTEROLOGY | Age: 72
End: 2022-05-19

## 2022-05-19 ENCOUNTER — TELEPHONE (OUTPATIENT)
Dept: WOUND CARE | Age: 72
End: 2022-05-19

## 2022-05-19 DIAGNOSIS — K92.2 GASTROINTESTINAL HEMORRHAGE, UNSPECIFIED GASTROINTESTINAL HEMORRHAGE TYPE: Primary | ICD-10-CM

## 2022-05-19 LAB
EKG ATRIAL RATE: 65 BPM
EKG P AXIS: 71 DEGREES
EKG P-R INTERVAL: 172 MS
EKG Q-T INTERVAL: 444 MS
EKG QRS DURATION: 100 MS
EKG QTC CALCULATION (BAZETT): 461 MS
EKG R AXIS: 8 DEGREES
EKG T AXIS: -5 DEGREES
EKG VENTRICULAR RATE: 65 BPM

## 2022-05-19 RX ORDER — POLYETHYLENE GLYCOL 3350, SODIUM CHLORIDE, POTASSIUM CHLORIDE, SODIUM BICARBONATE, AND SODIUM SULFATE 240; 5.84; 2.98; 6.72; 22.72 G/4L; G/4L; G/4L; G/4L; G/4L
4000 POWDER, FOR SOLUTION ORAL ONCE
Qty: 1 EACH | Refills: 0 | Status: SHIPPED | OUTPATIENT
Start: 2022-05-19 | End: 2022-05-19

## 2022-05-19 ASSESSMENT — ENCOUNTER SYMPTOMS
ABDOMINAL PAIN: 0
NAUSEA: 0
COUGH: 0
RHINORRHEA: 0
VOMITING: 0
DIARRHEA: 0
BACK PAIN: 0
SHORTNESS OF BREATH: 0

## 2022-05-19 NOTE — TELEPHONE ENCOUNTER
Please try to make her an appointment if any other providers with openings, keep legs elevated, continue to take her water pill    With might benefit from getting home care  Increase Bumex from 1 mg twice a day to 2 mg in the morning and 1 at lunch, to write down the instructions  . Current Outpatient Medications on File Prior to Visit   Medication Sig Dispense Refill    polyethylene glycol-electrolytes (COLYTE) 240 g SOLR solution Take 4,000 mLs by mouth once for 1 dose 1 each 0    saline nasal gel (AYR) GEL by Nasal route as needed for Congestion For nose bleeds, 2-3 times a day intranasal prn 14.1 g 3    vitamin D (ERGOCALCIFEROL) 1.25 MG (35128 UT) CAPS capsule Take 1 capsule by mouth once a week Sundays 12 capsule 0    saline nasal gel (AYR) GEL by Nasal route as needed for Congestion (and nose bleeds, 2-3 times a day) 14.5 g 3    amiodarone (CORDARONE) 200 MG tablet Take 1 tablet by mouth daily 90 tablet 0    ferrous sulfate (IRON 325) 325 (65 Fe) MG tablet Take 1 tablet by mouth daily (with breakfast) 180 tablet 1    apixaban (ELIQUIS) 2.5 MG TABS tablet Take 1 tablet by mouth 2 times daily 60 tablet 0    Nebulizers (COMPRESSOR/NEBULIZER) MISC Nebulizer with compressor. Disposable Med Nebs 2 /month. Reusable Med Nebs 1 per 6 months. Aerosol Mask 1 per month. Replacement Filters 2 per month. All other related supplies as needed per month. Medications being used: Albuterol . 083 unit dose vial. Frequency: every 6 hrs x 4/day . Length of Need: 1 1 each 3    albuterol (PROVENTIL) (2.5 MG/3ML) 0.083% nebulizer solution Take 3 mLs by nebulization every 6 hours as needed for Wheezing or Shortness of Breath 125 each 0    albuterol sulfate HFA (PROAIR HFA) 108 (90 Base) MCG/ACT inhaler Inhale 2 puffs into the lungs every 6 hours as needed for Wheezing or Shortness of Breath (cough) 8 g 3    Misc.  Devices (ADJUST FOLD CANE/YORK HANDLE) MISC Use daily for walking 1 each 0    Handicap Placard MISC by Does not apply route Expires on 12/30/25 1 each 0    allopurinol (ZYLOPRIM) 300 MG tablet Take 1 tablet by mouth daily Per rheumatologist 90 tablet 3    atorvastatin (LIPITOR) 40 MG tablet Take 1 tablet by mouth once daily 90 tablet 3    bumetanide (BUMEX) 1 MG tablet Take 1 tablet by mouth 2 times daily 180 tablet 3    chlorhexidine (HIBICLENS) 4 % external liquid for decontamination AND WASH LEGS EVERY  mL 2    desloratadine (CLARINEX) 5 MG tablet Take 1 tablet by mouth once daily 90 tablet 3    FreeStyle Lancets MISC 1 each by Does not apply route daily Patient needs to contact office before any further refills will be approved 90 each 3    magnesium oxide (MAG-OX) 400 (241.3 Mg) MG TABS tablet Take 1 tablet by mouth twice daily 180 tablet 3    metoprolol tartrate (LOPRESSOR) 50 MG tablet Take 1 tablet by mouth 2 times daily 180 tablet 3    miconazole (MICOTIN) 2 % powder Apply topically 2 times daily UNDER THE SKIN FOLDS LONG TERM 45 g 1    Multiple Vitamins-Minerals (THERAPEUTIC MULTIVITAMIN-MINERALS) tablet Take 1 tablet by mouth daily 90 tablet 3    Bismuth Tribromoph-Petrolatum (XEROFORM PETROLATUM PATCH 2\"X2\") PADS external pads Apply 1 each topically daily 30 each 2    blood glucose test strips (FREESTYLE LITE) strip 1 each by In Vitro route daily As needed. 100 each 3    Blood Pressure KIT 1 kit by Does not apply route three times daily 1 kit 0    blood glucose monitor kit and supplies 1 kit by Other route three times daily Dispense Butterfly Elite CBG Device 1 kit 0     No current facility-administered medications on file prior to visit.

## 2022-05-19 NOTE — TELEPHONE ENCOUNTER
Pt daughter Ernie Pollard called, states pt went to Er yesterday after Dr Nely Morales advised her to do, in regards to her abnormal blood work. ER sent pt home last evening after evaluation. Today pt has swelling and fluid filled blisters on lower legs after removing ace wraps last eveing. Pt scheduled for tomorrow am in wound care, and daughter states she has also called cardiologist in regard to RT fluid retention. Daughter states her mother has no other current symptoms. She stated that she is going to place compression stocking on her lower legs.  Please advise,

## 2022-05-19 NOTE — ED PROVIDER NOTES
[facility] ED  Emergency Department Encounter  Emergency Medicine Resident     Pt Name: Zack Valero  MRN: [id number]  [place] 1950  Date of evaluation: 5/18/22  PCP:  Ana Lai MD    [address] [place] [address]       Chief Complaint   Patient presents with    Abnormal Lab       HISTORY OFPRESENT ILLNESS  (Location/Symptom, Timing/Onset, Context/Setting, Quality, Duration, Modifying Factors,Severity.)      Zack Valero is a 66 y.o. female who presents questing PCP for abnormal labs. According to PCP, patient was brought into the clinic for lab testing with concerns of some altered mentation. When labs resulted this afternoon, PCP recommended presentation to the ED for admission for concerns of CHF. Patient states she feels the best she has felt in a very long time. She just finished a long stent with wound care and states her wounds on her lower extremities are healing very well. She does not feel weak, denies any shortness of breath or chest pain. No abdominal pain, nausea, vomiting. She has chronic headaches but this is no change for her. She states no recent change. With further discussion with family member in room, they are concerned about some delusions the patient is having, which is one of the problems for further lab work-up today. .  Patient has otherwise not been of harm to herself.      PAST MEDICAL / SURGICAL / SOCIAL / FAMILY HISTORY      has a past medical history of Acute CVA (cerebrovascular accident) ([address] Altered mental status, Arthritis, Brain mass, Cellulitis and abscess, Cellulitis and abscess of unspecified site, Cellulitis of both lower extremities, Cellulitis of left lower extremity, Cellulitis of lower extremity, CHF (congestive heart failure) ([address] Chronic kidney disease, unspecified, Coronary atherosclerosis of native coronary artery, Essential hypertension, Essential hypertension, malignant, Hallucinations, Hard of hearing, Head contusion, Hypokalemia, Iron deficiency anemia, unspecified, Meningioma ([facility] Myocardial infarction ([facility] Other and unspecified hyperlipidemia, Pure hypercholesterolemia, Toxic metabolic encephalopathy, Type II or unspecified type diabetes mellitus without mention of complication, not stated as uncontrolled, Unspecified asthma(493.90), Unspecified venous (peripheral) insufficiency, Unspecified vitamin D deficiency, and UTI (urinary tract infection). has a past surgical history that includes Tonsillectomy and adenoidectomy; Coronary artery bypass graft; intubation (3/7/2022); Thoracentesis (3/10/2022); Upper gastrointestinal endoscopy (N/A, 3/15/2022); and Colonoscopy (N/A, 3/15/2022). Social History     Socioeconomic History    Marital status:      Spouse name: Not on file    Number of children: Not on file    Years of education: Not on file    Highest education level: Not on file   Occupational History    Not on file   Tobacco Use    Smoking status: Former Smoker     Packs/day: 0.25     Years: 10.00     Pack years: 2.50     Quit date: 2000     Years since quittin.3    Smokeless tobacco: Never Used   Vaping Use    Vaping Use: Never used   Substance and Sexual Activity    Alcohol use: Not Currently     Alcohol/week: 0.0 standard drinks    Drug use: No    Sexual activity: Not on file   Other Topics Concern    Not on file   Social History Narrative    Not on file     Social Determinants of Health     Financial Resource Strain: Low Risk     Difficulty of Paying Living Expenses: Not very hard   Food Insecurity: No Food Insecurity    Worried About Running Out of Food in the Last Year: Never true    Holden of Food in the Last Year: Never true   Transportation Needs:     Lack of Transportation (Medical): Not on file    Lack of Transportation (Non-Medical):  Not on file   Physical Activity:     Days of Exercise per Week: Not on file    Minutes of Exercise per Session: Not on file   Stress:  Feeling of Stress : Not on file   Social Connections:     Frequency of Communication with Friends and Family: Not on file    Frequency of Social Gatherings with Friends and Family: Not on file    Attends Latter day Services: Not on file    Active Member of Clubs or Organizations: Not on file    Attends Club or Organization Meetings: Not on file    Marital Status: Not on file   Intimate Partner Violence:     Fear of Current or Ex-Partner: Not on file    Emotionally Abused: Not on file    Physically Abused: Not on file    Sexually Abused: Not on file   Housing Stability:     Unable to Pay for Housing in the Last Year: Not on file    Number of [place] in the Last Year: Not on file    Unstable Housing in the Last Year: Not on file       Family History   Problem Relation Age of Onset    Diabetes Mother     Heart Disease Mother     Heart Disease Father     Diabetes Sister     High Blood Pressure Sister     Diabetes Maternal Grandmother         Allergies:  Latex, Aleve [naproxen sodium], Pioglitazone, Claritin [loratadine], Keflex [cephalexin], and Lisinopril    Home Medications:  Prior to Admission medications    Medication Sig Start Date End Date Taking?  Authorizing Provider   polyethylene glycol-electrolytes (COLYTE) 240 g SOLR solution Take 4,000 mLs by mouth once for 1 dose 5/19/22 5/19/22  Joshua Arteaga MD   saline nasal gel (AYR) GEL by Nasal route as needed for Congestion For nose bleeds, 2-3 times a day intranasal prn 4/28/22   Rayna Porter MD   vitamin D (ERGOCALCIFEROL) 1.25 MG (02317 UT) CAPS capsule Take 1 capsule by mouth once a week Sundays 4/28/22   Rayna Porter MD   saline nasal gel (AYR) GEL by Nasal route as needed for Congestion (and nose bleeds, 2-3 times a day) 4/28/22   Rayna Porter MD   amiodarone (CORDARONE) 200 MG tablet Take 1 tablet by mouth daily 4/15/22   Rayna Porter MD   ferrous sulfate (IRON 325) 325 (65 Fe) MG tablet Take 1 tablet by mouth daily (with breakfast) 3/31/22   Mike Lester MD   apixaban Zakia Relic) 2.5 MG TABS tablet Take 1 tablet by mouth 2 times daily 3/16/22   Sanjana Boykin MD   Nebulizers (COMPRESSOR/NEBULIZER) MISC Nebulizer with compressor. Disposable Med Nebs 2 /month. Reusable Med Nebs 1 per 6 months. Aerosol Mask 1 per month. Replacement Filters 2 per month. All other related supplies as needed per month. Medications being used: Albuterol . 083 unit dose vial. Frequency: every 6 hrs x 4/day . Length of Need: 1 2/22/22   Mike Lester MD   albuterol (PROVENTIL) (2.5 MG/3ML) 0.083% nebulizer solution Take 3 mLs by nebulization every 6 hours as needed for Wheezing or Shortness of Breath 2/22/22   Mike Lester MD   albuterol sulfate HFA (PROAIR HFA) 108 (90 Base) MCG/ACT inhaler Inhale 2 puffs into the lungs every 6 hours as needed for Wheezing or Shortness of Breath (cough) 2/22/22   Mike Lester MD   [facility]  Devices (ADJUST FOLD CANE/YORK HANDLE) MISC Use daily for walking 2/22/22   Mike Lester MD   Handicap Placard MISC by Does not apply route Expires on 12/30/25 2/22/22   Mike Lester MD   allopurinol (ZYLOPRIM) 300 MG tablet Take 1 tablet by mouth daily Per rheumatologist 2/15/22   Elizabeth Kaufman MD   atorvastatin (LIPITOR) 40 MG tablet Take 1 tablet by mouth once daily 2/15/22   Elizabeth Kaufman MD   bumetanide (BUMEX) 1 MG tablet Take 1 tablet by mouth 2 times daily 2/15/22   Elizabeth Kaufman MD   chlorhexidine (HIBICLENS) 4 % external liquid for decontamination AND WASH LEGS EVERY DAY 2/15/22   Elizabeth Kaufman MD   desloratadine (CLARINEX) 5 MG tablet Take 1 tablet by mouth once daily 2/15/22   MD Kurt Castellanos Lancets MISC 1 each by Does not apply route daily Patient needs to contact office before any further refills will be approved 2/15/22   Elizabeth Kaufman MD   magnesium oxide (MAG-OX) 400 (241.3 Mg) MG TABS tablet Take 1 tablet by mouth twice daily 2/15/22 Michelle Ortega MD   metoprolol tartrate (LOPRESSOR) 50 MG tablet Take 1 tablet by mouth 2 times daily 2/15/22   Michelle Ortega MD   miconazole (MICOTIN) 2 % powder Apply topically 2 times daily UNDER THE SKIN FOLDS LONG TERM 2/15/22   Michelle Ortega MD   Multiple Vitamins-Minerals (THERAPEUTIC MULTIVITAMIN-MINERALS) tablet Take 1 tablet by mouth daily 2/15/22   Michelle Ortega MD   Bismuth Tribromoph-Petrolatum (XEROFORM PETROLATUM PATCH 2\"X2\") PADS external pads Apply 1 each topically daily 12/10/21   Ruby Skelton, APRN - CNP   blood glucose test strips (FREESTYLE LITE) strip 1 each by In Vitro route daily As needed. 3/19/21   Michelle Ortega MD   Blood Pressure KIT 1 kit by Does not apply route three times daily 12/19/19   Kiera Purcell MD   blood glucose monitor kit and supplies 1 kit by Other route three times daily Dispense Butterfly Elite CBG Device 8/20/19   Irena Jimenez MD       REVIEW OFSYSTEMS    (2-9 systems for level 4, 10 or more for level 5)      Review of Systems   Constitutional: Negative for chills and fever. HENT: Negative for congestion and rhinorrhea. Eyes: Negative for visual disturbance. Respiratory: Negative for cough and shortness of breath. Cardiovascular: Negative for chest pain. Gastrointestinal: Negative for abdominal pain, diarrhea, nausea and vomiting. Genitourinary: Negative for dysuria. Musculoskeletal: Negative for back pain and neck pain. Skin: Negative for rash. Neurological: Negative for weakness, numbness and headaches. PHYSICAL EXAM   (up to 7 for level 4, 8 or more forlevel 5)      INITIAL VITALS:   ED Triage Vitals [05/18/22 1924]   BP Temp Temp Source Pulse Resp SpO2 Height Weight   (!) 164/46 97.3 °F (36.3 °C) Oral 68 18 100 % 4' 11\" (1.499 m) 135 lb (61.2 kg)       Physical Exam  Constitutional:       General: She is not in acute distress. Appearance: Normal appearance. She is normal weight.  She is not ill-appearing, toxic-appearing or diaphoretic. HENT:      Head: Normocephalic and atraumatic. Nose: Nose normal.      Mouth/Throat:      Mouth: Mucous membranes are moist.      Pharynx: Oropharynx is clear. No oropharyngeal exudate or posterior oropharyngeal erythema. Eyes:      Extraocular Movements: Extraocular movements intact. Pupils: Pupils are equal, round, and reactive to light. Cardiovascular:      Rate and Rhythm: Normal rate and regular rhythm. Heart sounds: Normal heart sounds. No murmur heard. Pulmonary:      Effort: Pulmonary effort is normal. No respiratory distress. Breath sounds: Normal breath sounds. No wheezing, rhonchi or rales. Abdominal:      General: There is no distension. Palpations: Abdomen is soft. Tenderness: There is no abdominal tenderness. There is no guarding or rebound. Musculoskeletal:         General: No tenderness. Normal range of motion. Cervical back: Normal range of motion and neck supple. Right lower leg: No edema. Left lower leg: No edema. Comments: Bilateral lower extremities wrapped in Ace wraps. No significant edema. Skin:     General: Skin is warm and dry. Neurological:      General: No focal deficit present. Mental Status: She is alert and oriented to person, place, and time. Sensory: No sensory deficit. Motor: No weakness. Psychiatric:         Mood and Affect: Mood normal.         DIFFERENTIAL  DIAGNOSIS     PLAN (LABS / IMAGING / EKG):  Orders Placed This Encounter   Procedures    XR CHEST (2 VW)    Inpatient consult to Internal Medicine    EKG 12 Lead       MEDICATIONS ORDERED:  No orders of the defined types were placed in this encounter. Initial MDM/Plan/ED COURSE:    66 y.o. female who presents by request of PCP for concerns of abnormal lab values. On review of chart, BNP is slightly elevated from her baseline and GFR has decreased from 36-30.   Creatinine does not show significant increased to suggest JOY. Patient arrives with no complaints. She states \"I feel the best I have felt in a long time\". She has no clinical signs of CHF and no crackles on physical exam.  Patient tolerating orals normally. Without clinical signs of heart failure and only mild increase in creatinine, will obtain x-ray to rule out fluid overload, EKG performed as well. Will attempt to contact PCP for further discussion as patient does not appear to meet inpatient criteria at this time. ED Course as of 05/19/22 1012   Wed May 18, 2022   2128 XR CHEST (2 VW)  IMPRESSION:  Mild pulmonary vascular congestion     Prominent naima suggesting pulmonary arterial hypertension. [JS]      ED Course User Index  [JS] Ashley Pulido DO      Multiple calls attempted to reach PCP. As discussed earlier, patient is not meeting inpatient criteria and she would not like to be admitted. Family member is concerned about delusions but states she is otherwise safe at home and has been doing well. We discussed discharge home with close follow-up. Also discussed this case with admitting internal medicine team who agrees that there are no significant criteria met for admission at this time. Patient discharged home in stable condition with strict return precautions. We also discussed neurology follow up for further evaluation of delusions. DIAGNOSTIC RESULTS / EMERGENCYDEPARTMENT COURSE / MDM     LABS:  Labs Reviewed - No data to display        XR CHEST (2 VW)    Result Date: 5/18/2022  EXAMINATION: TWO XRAY VIEWS OF THE CHEST 5/18/2022 5:14 pm COMPARISON: 03/24/2022 HISTORY: ORDERING SYSTEM PROVIDED HISTORY: ams, chf TECHNOLOGIST PROVIDED HISTORY: ams, chf Reason for Exam: ams, chf FINDINGS: Cardiac size is stable. Stable opacity in the medial right base. The pulmonary vascularity is hazy and indistinct. No pneumothorax. Small bilateral pleural effusions.   Prominent naima suggesting pulmonary arterial hypertension. Postsurgical changes overlying the mediastinum. Mild pulmonary vascular congestion Prominent naima suggesting pulmonary arterial hypertension. EKG      All EKG's are interpreted by the Emergency Department Physicianwho either signs or Co-signs this chart in the absence of a cardiologist.      PROCEDURES:  None    CONSULTS:  IP CONSULT TO INTERNAL MEDICINE    CRITICAL CARE:  Please see attending note    FINAL IMPRESSION      1.  Abnormal laboratory test result          DISPOSITION / PLAN     DISPOSITION Decision To Discharge 05/18/2022 10:25:51 PM      PATIENT REFERRED TO:  Rayna Porter MD  [address] [place] [address].  [address] [place] [address]  [phone]    Schedule an appointment as soon as possible for a visit in 1 day      [facility] ED  [facility] [address]  [address] [place] [address]  [phone]    If symptoms worsen      DISCHARGE MEDICATIONS:  Discharge Medication List as of 5/18/2022 10:27 PM          Robina Dexter DO  Emergency Medicine Resident    (Please note that portions of this note were completed with a voice recognition program.Efforts were made to edit the dictations but occasionally words are mis-transcribed.)       Robina Dexter DO  Resident  05/19/22 1012

## 2022-05-19 NOTE — TELEPHONE ENCOUNTER
Writer called the number in the patient's chart and the patient's daughter, Lj Gómez (caller is on HIPAA). Writer scheduled a capsule for the patient with the daughter. The capsule was scheduled for 6/28/22. Writer sent the instructions through My Chart and to the patient's home.

## 2022-05-19 NOTE — ED PROVIDER NOTES
EMERGENCY DEPARTMENT ENCOUNTER   ATTENDING ATTESTATION     Pt Name: Betty Ni  MRN: [id number]  [place] 1950  Date of evaluation: 5/18/22       Betty Ni is a 66 y.o. female who presents with Abnormal Lab      MDM:   Patient sent in by primary care physician for some abnormal lab work  Elevated BNP at 7000. Which is elevated over value from 4/20/2022 which was nearly 4500 however it is lower than what patient has had in the past.  Creatinine has worsened from 1.4-1.7 GFR has only changed from 36-30. Liver function tests within normal limits largely unchanged as CBC. Urine analysis not indicative of infection but demonstrating 21-50 RBCs, patient does have a history of nephritis    Patient has no complaints, she states that she feels well. She states that she does not have any shortness of breath and that she has been resting well, sleeping well and overall feels better than when she was in the hospital recently. She denies any chest pain or shortness of breath. She denies any changes to her back pain. She does have rather severe kyphosis but she states this has been unchanged. Plan for EKG, chest x-ray. Will not repeat lab work as this was done today and reevaluate. At this time, based on patient presentation and lab work, I do not feel that there is any need to admit patient. Did try to contact patient's primary care provider several times but did not receive a call back. Due to the fact the patient is not meeting any criteria for admission and unable to speak with primary care provider, plan for discharge. Patient states that she feels well, infectious states that this is the best she is felt in several months.   Patient can follow-up with primary care provider and she is safe for discharge    Vitals:   Vitals:    05/18/22 1924   BP: (!) 164/46   Pulse: 68   Resp: 18   Temp: 97.3 °F (36.3 °C)   TempSrc: Oral   SpO2: 100%   Weight: 135 lb (61.2 kg)   Height: 4' 11\" (1.499 m) PHYSICAL:   Temp: 97.3 °F (36.3 °C),  Pulse: 68, Resp: 18, BP: (!) 164/46, SpO2: 100 %  Gen: Non-toxic, Afebrile  Neck: Supple  Cards: Regular rate and rhythm  Pulm: Lung sounds clear to auscultation, no crackles or rales  Abdomen: Soft, non-tender, non-distended  Skin: warm, dry  Extremities: pulses 2+ radial / dorsalis pedis, no clubbing, cyanosis, edema  Neurologic: , no facial asymmetry, no dysarthria or aphasia. Oriented to place, self. Did know the year and the month but was unsure of the date. Moderate to severe kyphosis present      EKG    EKG Interpretation    EKG Interpretation    Interpreted by me    Rhythm: normal sinus   Rate: normal, 65  Axis: normal to leftward  Ectopy: none  Conduction: LVH, QRS greater than 10 and aVL  ST Segments: no acute change  T Waves: no acute change  Q Waves: none    Clinical Impression: Sinus rhythm rate of 65. No ST segment changes, no arrhythmia, no ectopy. Normal to leftward axis. Poor R wave progression. All EKG's are interpreted by the Emergency Department Physician whoeither signs or Co-signs this chart in the absence of a cardiologist.    I personally saw and examined the patient. I have reviewed and agree with the resident's findings, including all diagnostic interpretations and treatment plan as written. I was present for the key portions of any procedures performed and the inclusive time noted for any critical care statement. There was a high probability of clinically significant/life threatening deterioration in this patient's condition which required my urgent intervention. Total critical care time was 15 minutes. This excludes any time for separately reportable procedures. ED Course as of 05/19/22 0746   Wed May 18, 2022   2128 XR CHEST (2 VW)  IMPRESSION:  Mild pulmonary vascular congestion     Prominent naima suggesting pulmonary arterial hypertension.  [JS]      ED Course User Index  [JS] Robina Dexter, DO       The care is provided during an unprecedented national emergency due to the novel coronavirus, COVID 19.   Jony Figueredo DO  Attending Emergency Physician          Jony Figueredo DO  05/19/22 1500

## 2022-05-20 ENCOUNTER — HOSPITAL ENCOUNTER (OUTPATIENT)
Dept: WOUND CARE | Age: 72
Discharge: HOME | End: 2022-05-20
Payer: MEDICARE

## 2022-05-20 VITALS
SYSTOLIC BLOOD PRESSURE: 175 MMHG | RESPIRATION RATE: 20 BRPM | TEMPERATURE: 96.8 F | BODY MASS INDEX: 27.21 KG/M2 | HEIGHT: 59 IN | WEIGHT: 135 LBS | DIASTOLIC BLOOD PRESSURE: 60 MMHG | HEART RATE: 61 BPM

## 2022-05-20 DIAGNOSIS — I87.2 VENOUS INSUFFICIENCY OF BOTH LOWER EXTREMITIES: ICD-10-CM

## 2022-05-20 DIAGNOSIS — L97.922 LEG ULCER, LEFT, WITH FAT LAYER EXPOSED (HCC): ICD-10-CM

## 2022-05-20 DIAGNOSIS — L97.912 LEG ULCER, RIGHT, WITH FAT LAYER EXPOSED (HCC): Primary | ICD-10-CM

## 2022-05-20 DIAGNOSIS — I89.0 LYMPHEDEMA: ICD-10-CM

## 2022-05-20 DIAGNOSIS — R60.0 BILATERAL LOWER EXTREMITY EDEMA: ICD-10-CM

## 2022-05-20 PROBLEM — L03.115 CELLULITIS OF BOTH LOWER EXTREMITIES: Status: RESOLVED | Noted: 2021-05-26 | Resolved: 2022-05-20

## 2022-05-20 PROBLEM — L03.116 CELLULITIS OF BOTH LOWER EXTREMITIES: Status: RESOLVED | Noted: 2021-05-26 | Resolved: 2022-05-20

## 2022-05-20 PROBLEM — L97.312 NON-PRESSURE CHRONIC ULCER OF RIGHT ANKLE WITH FAT LAYER EXPOSED (HCC): Status: RESOLVED | Noted: 2022-04-20 | Resolved: 2022-05-20

## 2022-05-20 PROBLEM — L97.312 NON-PRESSURE CHRONIC ULCER OF RIGHT ANKLE WITH FAT LAYER EXPOSED (HCC): Status: ACTIVE | Noted: 2022-04-20

## 2022-05-20 PROCEDURE — 97597 DBRDMT OPN WND 1ST 20 CM/<: CPT

## 2022-05-20 PROCEDURE — 97597 DBRDMT OPN WND 1ST 20 CM/<: CPT | Performed by: NURSE PRACTITIONER

## 2022-05-20 RX ORDER — LIDOCAINE HYDROCHLORIDE 40 MG/ML
SOLUTION TOPICAL ONCE
Status: COMPLETED | OUTPATIENT
Start: 2022-05-20 | End: 2022-05-20

## 2022-05-20 RX ORDER — LIDOCAINE HYDROCHLORIDE 40 MG/ML
SOLUTION TOPICAL ONCE
Status: CANCELLED | OUTPATIENT
Start: 2022-05-20 | End: 2022-05-20

## 2022-05-20 RX ORDER — LIDOCAINE HYDROCHLORIDE 20 MG/ML
JELLY TOPICAL ONCE
Status: CANCELLED | OUTPATIENT
Start: 2022-05-20 | End: 2022-05-20

## 2022-05-20 RX ADMIN — LIDOCAINE HYDROCHLORIDE 15 ML: 40 SOLUTION TOPICAL at 12:12

## 2022-05-20 NOTE — TELEPHONE ENCOUNTER
Noted. Thank you!     Future Appointments   Date Time Provider Angela Reyez   5/20/2022 11:15 AM MAL Melo - CNP ST[facility] WND CAR [facility]   5/25/2022 11:00 AM Renata Bynum MD fp [facility]   5/25/2022  2:15 PM Darrius Holliday MD [address] [place] [address]   5/31/2022  1:00  [facility] VASCULAR RM [address]   6/14/2022  2:00  [facility] [address] DIGITAL RM STCZ MAMMO 145 [facility] Radiolog   6/14/2022  2:30  [facility] [address] DEXA RM STCZ MAMMO 145 [facility] Radiolog   8/4/2022  2:30 PM Renata Bynum MD fp [facility]   11/25/2022  3:30 PM Renata Bynum MD fp sc [facility]

## 2022-05-20 NOTE — PLAN OF CARE
Problem: Chronic Conditions and Co-morbidities  Goal: Patient's chronic conditions and co-morbidity symptoms are monitored and maintained or improved  Outcome: Progressing     Problem: Discharge Planning  Goal: Discharge to home or other facility with appropriate resources  Outcome: Progressing     Problem: Wound:  Goal: Will show signs of wound healing; wound closure and no evidence of infection  Description: Will show signs of wound healing; wound closure and no evidence of infection  Outcome: Progressing

## 2022-05-20 NOTE — PROGRESS NOTES
[address]   Progress Note and Procedure Note      Maggie Ojeda  MEDICAL RECORD NUMBER:  [id number]  AGE: 66 y.o. GENDER: female  : 1950  EPISODE DATE:  2022    Subjective:     Chief Complaint   Patient presents with    Wound Check     bilateral lower legs         HISTORY of PRESENT ILLNESS HPI     Liz Mehta is a 66 y.o. female who presents today for wound/ulcer evaluation. History of Wound Context: here to follow up on bilateral lower leg ulcerations. Was healed 3 days ago but blisters developed later that evening. Have opened up and draining large amount of serous fluid. Here with daughter who updates that she was seen in ER 3 days ago d/t being in acute heart failure. Was evaluated and sent home. Daughter has been in contact with PCP who is adjusting diuretics. Patient did well with unna boots, will place today and have them follow up her early next week.     Wound/Ulcer Pain Timing/Severity: none  Quality of pain: N/A  Severity:  0 / 10   Modifying Factors: None  Associated Signs/Symptoms: none    Ulcer Identification:  Ulcer Type: venous  Contributing Factors: edema, venous stasis, lymphedema and decreased mobility    Wound: N/A        PAST MEDICAL HISTORY        Diagnosis Date    Acute CVA (cerebrovascular accident) ([address] 2020    Altered mental status 2021    Arthritis     Brain mass     Cellulitis and abscess     Cellulitis and abscess of unspecified site     Cellulitis of both lower extremities 2021    Cellulitis of left lower extremity 2020    Cellulitis of lower extremity 10/4/2020    CHF (congestive heart failure) (HCC)     Chronic kidney disease, unspecified     Coronary atherosclerosis of native coronary artery     Essential hypertension 2020    Essential hypertension, malignant     Hallucinations 2021    Hard of hearing     Head contusion 2020    Hypokalemia 2020    Iron deficiency anemia, unspecified     [facility]    Myocardial infarction ([facility]     Other and unspecified hyperlipidemia     Pure hypercholesterolemia     Toxic metabolic encephalopathy 3/41/7690    Type II or unspecified type diabetes mellitus without mention of complication, not stated as uncontrolled     Unspecified asthma(493.90)     Unspecified venous (peripheral) insufficiency     Unspecified vitamin D deficiency     UTI (urinary tract infection) 2021       PAST SURGICAL HISTORY    Past Surgical History:   Procedure Laterality Date    COLONOSCOPY N/A 3/15/2022    COLONOSCOPY DIAGNOSTIC performed by Keshav Pal MD at [facility].      x 3, Dr. Sunny Guerrero  3/7/2022         THORACENTESIS  3/10/2022         TONSILLECTOMY AND ADENOIDECTOMY      UPPER GASTROINTESTINAL ENDOSCOPY N/A 3/15/2022    EGD BIOPSY performed by Keshav Pal MD at [address] [place] [address]    Family History   Problem Relation Age of Onset    Diabetes Mother     Heart Disease Mother     Heart Disease Father     Diabetes Sister     High Blood Pressure Sister     Diabetes Maternal Grandmother        SOCIAL HISTORY    Social History     Tobacco Use    Smoking status: Former Smoker     Packs/day: 0.25     Years: 10.00     Pack years: 2.50     Quit date: 2000     Years since quittin.3    Smokeless tobacco: Never Used   Vaping Use    Vaping Use: Never used   Substance Use Topics    Alcohol use: Not Currently     Alcohol/week: 0.0 standard drinks    Drug use: No       ALLERGIES    Allergies   Allergen Reactions    Latex Rash    Aleve [Naproxen Sodium]      Chronic kidney disease stage III, CHF    Pioglitazone Other (See Comments)     Congestive heart failure    Claritin [Loratadine]     Keflex [Cephalexin]     Lisinopril      Needs clarification of contraindication       MEDICATIONS    Current Outpatient Medications on File Prior to Encounter   Medication Sig mouth once daily 90 tablet 3    FreeStyle Lancets MISC 1 each by Does not apply route daily Patient needs to contact office before any further refills will be approved 90 each 3    magnesium oxide (MAG-OX) 400 (241.3 Mg) MG TABS tablet Take 1 tablet by mouth twice daily 180 tablet 3    metoprolol tartrate (LOPRESSOR) 50 MG tablet Take 1 tablet by mouth 2 times daily 180 tablet 3    miconazole (MICOTIN) 2 % powder Apply topically 2 times daily UNDER THE SKIN FOLDS LONG TERM 45 g 1    Multiple Vitamins-Minerals (THERAPEUTIC MULTIVITAMIN-MINERALS) tablet Take 1 tablet by mouth daily 90 tablet 3    Bismuth Tribromoph-Petrolatum (XEROFORM PETROLATUM PATCH 2\"X2\") PADS external pads Apply 1 each topically daily 30 each 2    blood glucose test strips (FREESTYLE LITE) strip 1 each by In Vitro route daily As needed. 100 each 3    Blood Pressure KIT 1 kit by Does not apply route three times daily 1 kit 0    blood glucose monitor kit and supplies 1 kit by Other route three times daily Dispense Butterfly Elite CBG Device 1 kit 0     No current facility-administered medications on file prior to encounter. REVIEW OF SYSTEMS    Pertinent items are noted in HPI. Objective:      BP (!) 175/60   Pulse 61   Temp 96.8 °F (36 °C) (Tympanic)   Resp 20   Ht 4' 11\" (1.499 m)   Wt 135 lb (61.2 kg)   BMI 27.27 kg/m²     Wt Readings from Last 3 Encounters:   05/20/22 135 lb (61.2 kg)   05/18/22 135 lb (61.2 kg)   05/18/22 135 lb (61.2 kg)       PHYSICAL EXAM    General Appearance: alert and oriented to person, place and time, well-developed and well-nourished, in no acute distress  Skin: warm and dry, no rash or erythema, bilateral lower leg ulcerations   Head: normocephalic and atraumatic  Eyes: pupils equal, round, extraocular eye movements intact, and conjunctivae normal  Pulmonary/Chest: normal air movement, no respiratory distress  Extremities: no cyanosis and no clubbing.  Bilateral lower leg edema Musculoskeletal: no joint swelling, deformity or tenderness  Neurologic: gait, coordination normal and speech normal      Assessment:     Problem List Items Addressed This Visit     Bilateral lower extremity edema    Relevant Orders    Initiate Outpatient Wound Care Protocol    Leg ulcer, left, with fat layer exposed ([address]    Relevant Orders    Initiate Outpatient Wound Care Protocol    Leg ulcer, right, with fat layer exposed ([address] - Primary    Relevant Orders    Initiate Outpatient Wound Care Protocol    Lymphedema    Relevant Orders    Initiate Outpatient Wound Care Protocol    Venous insufficiency of both lower extremities    Relevant Orders    Initiate Outpatient Wound Care Protocol           Procedure Note  Indications:  Based on my examination of this patient's wound(s)/ulcer(s) today, debridement is required to promote healing and evaluate the wound base. Performed by: MAL Teresa - CNP    Consent obtained:  Yes    Time out taken:  Yes    Pain Control: Anesthetic  Anesthetic: 4% Lidocaine Liquid Topical     Debridement:Other (comment) open debridement    Using scissors and forceps the wound(s)/ulcer(s) was/were sharply debrided down through and including the removal of dermis.         Devitalized Tissue Debrided:  slough    Pre Debridement Measurements:  Are located in the Wound/Ulcer Documentation Flow Sheet    Wound/Ulcer #: 1, 2 and 4    Post Debridement Measurements:  Wound/Ulcer Descriptions are Pre Debridement except measurements:    Wound 05/20/22 Right Wound #1 right lower lat leg cluster (Active)   Wound Image   05/20/22 1155   Wound Etiology Venous 05/20/22 1155   Wound Cleansed Soap and water 05/20/22 1155   Wound Length (cm) 7.5 cm 05/20/22 1155   Wound Width (cm) 2 cm 05/20/22 1155   Wound Depth (cm) 0.1 cm 05/20/22 1155   Wound Surface Area (cm^2) 15 cm^2 05/20/22 1155   Wound Volume (cm^3) 1.5 cm^3 05/20/22 1155   Post-Procedure Length (cm) 7.5 cm 05/20/22 1155   Post-Procedure Width (cm) 2 cm 05/20/22 1155   Post-Procedure Depth (cm) 0.1 cm 05/20/22 1155   Post-Procedure Surface Area (cm^2) 15 cm^2 05/20/22 1155   Post-Procedure Volume (cm^3) 1.5 cm^3 05/20/22 1155   Wound Assessment Pink/red;Ruptured blister 05/20/22 1155   Drainage Amount Moderate 05/20/22 1155   Drainage Description Serous 05/20/22 1155   Odor Other (comment) 05/20/22 1155   Marian-wound Assessment Hyperpigmented 05/20/22 1155   Number of days: 0       Wound 05/20/22 Right wound # 2 Right Pretib Cluster (Active)   Wound Image   05/20/22 1155   Wound Etiology Venous 05/20/22 1155   Wound Cleansed Soap and water 05/20/22 1155   Wound Length (cm) 5.4 cm 05/20/22 1155   Wound Width (cm) 13 cm 05/20/22 1155   Wound Depth (cm) 0.1 cm 05/20/22 1155   Wound Surface Area (cm^2) 70.2 cm^2 05/20/22 1155   Wound Volume (cm^3) 7.02 cm^3 05/20/22 1155   Post-Procedure Length (cm) 5.4 cm 05/20/22 1155   Post-Procedure Width (cm) 13 cm 05/20/22 1155   Post-Procedure Depth (cm) 0.1 cm 05/20/22 1155   Post-Procedure Surface Area (cm^2) 70.2 cm^2 05/20/22 1155   Post-Procedure Volume (cm^3) 7.02 cm^3 05/20/22 1155   Wound Assessment Pink/red;Ruptured blister 05/20/22 1155   Drainage Amount Moderate 05/20/22 1155   Drainage Description Serous 05/20/22 1155   Odor None 05/20/22 1155   Marian-wound Assessment Hyperpigmented 05/20/22 1155   Margins Attached edges 05/20/22 1155   Wound Thickness Description not for Pressure Injury Full thickness 05/20/22 1155   Number of days: 0       Wound 05/20/22 Pretibial Left #3 Wound Left Medial Lower leg (Active)   Number of days: 0       Wound 05/20/22 Left;Proximal Wound # 4 Left lower lat leg (Active)   Wound Image   05/20/22 1155   Wound Etiology Venous 05/20/22 1155   Wound Cleansed Soap and water 05/20/22 1155   Wound Length (cm) 3.7 cm 05/20/22 1155   Wound Width (cm) 4 cm 05/20/22 1155   Wound Depth (cm) 0.1 cm 05/20/22 1155   Wound Surface Area (cm^2) 14.8 cm^2 05/20/22 1155   Wound Volume (cm^3) 1.48 cm^3 05/20/22 1155   Post-Procedure Length (cm) 3.7 cm 05/20/22 1155   Post-Procedure Width (cm) 4 cm 05/20/22 1155   Post-Procedure Depth (cm) 0.1 cm 05/20/22 1155   Post-Procedure Surface Area (cm^2) 14.8 cm^2 05/20/22 1155   Post-Procedure Volume (cm^3) 1.48 cm^3 05/20/22 1155   Wound Assessment Pink/red 05/20/22 1155   Drainage Amount Moderate 05/20/22 1155   Drainage Description Serous 05/20/22 1155   Odor None 05/20/22 1155   Marian-wound Assessment Fragile; Hyperpigmented 05/20/22 1155   Margins Attached edges 05/20/22 1155   Number of days: 0          Percent of Wound(s)/Ulcer(s) Debrided: 20%    Total Surface Area Debrided:  20.0 sq cm     Estimated Blood Loss:  Minimal    Hemostasis Achieved:  by pressure    Procedural Pain:  0  / 10     Post Procedural Pain:  0 / 10     Response to treatment:  Well tolerated by patient. Plan:     Treatment Note please see Discharge Instructions    Written patient dismissal instructions given to patient and signed by patient or POA.            Electronically signed by MAL Fontenot CNP on 5/20/2022 at 12:22 PM

## 2022-05-20 NOTE — TELEPHONE ENCOUNTER
Patient and her daughter both have a lot of appointments so I got her scheduled for a 15 minute VV on 5/25 with you. She sees wound care today.

## 2022-05-24 ENCOUNTER — HOSPITAL ENCOUNTER (OUTPATIENT)
Dept: WOUND CARE | Age: 72
Discharge: HOME | End: 2022-05-24
Payer: MEDICARE

## 2022-05-24 VITALS
HEART RATE: 76 BPM | TEMPERATURE: 96.5 F | WEIGHT: 135 LBS | RESPIRATION RATE: 20 BRPM | BODY MASS INDEX: 27.21 KG/M2 | HEIGHT: 59 IN | DIASTOLIC BLOOD PRESSURE: 79 MMHG | SYSTOLIC BLOOD PRESSURE: 186 MMHG

## 2022-05-24 DIAGNOSIS — L97.912 LEG ULCER, RIGHT, WITH FAT LAYER EXPOSED (HCC): Primary | ICD-10-CM

## 2022-05-24 DIAGNOSIS — I89.0 LYMPHEDEMA: ICD-10-CM

## 2022-05-24 DIAGNOSIS — L03.115 CELLULITIS OF BOTH LOWER EXTREMITIES: ICD-10-CM

## 2022-05-24 DIAGNOSIS — I87.2 VENOUS INSUFFICIENCY OF BOTH LOWER EXTREMITIES: ICD-10-CM

## 2022-05-24 DIAGNOSIS — L03.116 CELLULITIS OF BOTH LOWER EXTREMITIES: ICD-10-CM

## 2022-05-24 DIAGNOSIS — R60.0 BILATERAL LOWER EXTREMITY EDEMA: ICD-10-CM

## 2022-05-24 DIAGNOSIS — L97.922 LEG ULCER, LEFT, WITH FAT LAYER EXPOSED (HCC): ICD-10-CM

## 2022-05-24 PROCEDURE — 99214 OFFICE O/P EST MOD 30 MIN: CPT | Performed by: NURSE PRACTITIONER

## 2022-05-24 PROCEDURE — 99213 OFFICE O/P EST LOW 20 MIN: CPT

## 2022-05-24 RX ORDER — LIDOCAINE HYDROCHLORIDE 20 MG/ML
JELLY TOPICAL ONCE
Status: CANCELLED | OUTPATIENT
Start: 2022-05-24 | End: 2022-05-24

## 2022-05-24 RX ORDER — LIDOCAINE HYDROCHLORIDE 40 MG/ML
SOLUTION TOPICAL ONCE
Status: CANCELLED | OUTPATIENT
Start: 2022-05-24 | End: 2022-05-24

## 2022-05-24 RX ORDER — DOXYCYCLINE HYCLATE 100 MG
100 TABLET ORAL 2 TIMES DAILY
Qty: 20 TABLET | Refills: 0 | Status: SHIPPED | OUTPATIENT
Start: 2022-05-24 | End: 2022-06-03

## 2022-05-24 RX ORDER — LIDOCAINE HYDROCHLORIDE 40 MG/ML
SOLUTION TOPICAL ONCE
Status: COMPLETED | OUTPATIENT
Start: 2022-05-24 | End: 2022-05-24

## 2022-05-24 RX ADMIN — LIDOCAINE HYDROCHLORIDE 10 ML: 40 SOLUTION TOPICAL at 15:31

## 2022-05-24 ASSESSMENT — PAIN DESCRIPTION - FREQUENCY: FREQUENCY: CONTINUOUS

## 2022-05-24 ASSESSMENT — PAIN DESCRIPTION - ORIENTATION: ORIENTATION: RIGHT;LEFT;LOWER

## 2022-05-24 ASSESSMENT — PAIN DESCRIPTION - ONSET: ONSET: ON-GOING

## 2022-05-24 ASSESSMENT — PAIN SCALES - GENERAL: PAINLEVEL_OUTOF10: 6

## 2022-05-24 ASSESSMENT — PAIN DESCRIPTION - DESCRIPTORS: DESCRIPTORS: ACHING;BURNING

## 2022-05-24 ASSESSMENT — PAIN DESCRIPTION - PAIN TYPE: TYPE: CHRONIC PAIN

## 2022-05-24 ASSESSMENT — PAIN DESCRIPTION - LOCATION: LOCATION: LEG

## 2022-05-24 NOTE — PROGRESS NOTES
[address]   Progress Note and Procedure Note      Rhiannon Ojeda  MEDICAL RECORD NUMBER:  [id number]  AGE: 66 y.o. GENDER: female  : 1950  EPISODE DATE:  2022    Subjective:     Chief Complaint   Patient presents with    Wound Check     lower legs         HISTORY of PRESENT ILLNESS HPI     Ashtyn Townsend is a 66 y.o. female who presents today for wound/ulcer evaluation. History of Wound Context: here to follow up on bilateral lower leg wounds that are worse. Has cellulitis with erythema, warmth and pain to bilateral lower legs. Blood pressure is elevated in clinic today as well. I did recommend evaluation in ER - she refused to go. Daughter is with her and will be monitoring at home. If any worsening symptoms, will take her to ER. I did send doxycycline for her to start and take for 10 days.    Wound/Ulcer Pain Timing/Severity: constant  Quality of pain: aching, burning  Severity:  7 / 10   Modifying Factors: None  Associated Signs/Symptoms: edema, erythema, drainage and pain    Ulcer Identification:  Ulcer Type: venous  Contributing Factors: edema, venous stasis, lymphedema and decreased mobility    Wound: N/A        PAST MEDICAL HISTORY        Diagnosis Date    [facility] 2020    Altered mental status 2021    Arthritis     Brain mass     Cellulitis and abscess     Cellulitis and abscess of unspecified site     Cellulitis of both lower extremities 2021    Cellulitis of left lower extremity 2020    Cellulitis of lower extremity 10/4/2020    CHF (congestive heart failure) (HCC)     Chronic kidney disease, unspecified     Coronary atherosclerosis of native coronary artery     Essential hypertension 2020    Essential hypertension, malignant     Hallucinations 2021    Hard of hearing     Head contusion 2020    Hypokalemia 2020    Iron deficiency anemia, unspecified     Mening[facility]oma ([facility] 2021    Myocardial infarction ([facility]     Other and unspecified hyperlipidemia     Pure hypercholesterolemia     Toxic metabolic encephalopathy 859    Type II or unspecified type diabetes mellitus without mention of complication, not stated as uncontrolled     Unspecified asthma(493.90)     Unspecified venous (peripheral) insufficiency     Unspecified vitamin D deficiency     UTI (urinary tract infection) 2021       PAST SURGICAL HISTORY    Past Surgical History:   Procedure Laterality Date    COLONOSCOPY N/A 3/15/2022    COLONOSCOPY DIAGNOSTIC performed by Marcellus Doshi MD at [facility]      x 3, Dr. Abida Salinas  3/7/2022         THORACENTESIS  3/10/2022         TONSILLECTOMY AND ADENOIDECTOMY      UPPER GASTROINTESTINAL ENDOSCOPY N/A 3/15/2022    EGD BIOPSY performed by Marcellus Doshi MD at [address] [place] [address]    Family History   Problem Relation Age of Onset    Diabetes Mother     Heart Disease Mother     Heart Disease Father     Diabetes Sister     High Blood Pressure Sister     Diabetes Maternal Grandmother        SOCIAL HISTORY    Social History     Tobacco Use    Smoking status: Former Smoker     Packs/day: 0.25     Years: 10.00     Pack years: 2.50     Quit date: 2000     Years since quittin.4    Smokeless tobacco: Never Used   Vaping Use    Vaping Use: Never used   Substance Use Topics    Alcohol use: Not Currently     Alcohol/week: 0.0 standard drinks    Drug use: No       ALLERGIES    Allergies   Allergen Reactions    Latex Rash    Aleve [Naproxen Sodium]      Chronic kidney disease stage III, CHF    Pioglitazone Other (See Comments)     Congestive heart failure    Claritin [Loratadine]     Keflex [Cephalexin]     Lisinopril      Needs clarification of contraindication       MEDICATIONS    Current Outpatient Medications on File Prior to Encounter   Medication Sig Dispense Refill    bumetanide (BUMEX) 2 MG tablet Take 1 tablet by mouth every morning Will take an additional 1 mg in the pm 90 tablet 3    saline nasal gel (AYR) GEL by Nasal route as needed for Congestion For nose bleeds, 2-3 times a day intranasal prn 14.1 g 3    vitamin D (ERGOCALCIFEROL) 1.25 MG (10193 UT) CAPS capsule Take 1 capsule by mouth once a week Sundays 12 capsule 0    saline nasal gel (AYR) GEL by Nasal route as needed for Congestion (and nose bleeds, 2-3 times a day) 14.5 g 3    amiodarone (CORDARONE) 200 MG tablet Take 1 tablet by mouth daily 90 tablet 0    ferrous sulfate (IRON 325) 325 (65 Fe) MG tablet Take 1 tablet by mouth daily (with breakfast) 180 tablet 1    apixaban (ELIQUIS) 2.5 MG TABS tablet Take 1 tablet by mouth 2 times daily 60 tablet 0    Nebulizers (COMPRESSOR/NEBULIZER) MISC Nebulizer with compressor. Disposable Med Nebs 2 /month. Reusable Med Nebs 1 per 6 months. Aerosol Mask 1 per month. Replacement Filters 2 per month. All other related supplies as needed per month. Medications being used: Albuterol . 083 unit dose vial. Frequency: every 6 hrs x 4/day . Length of Need: 1 1 each 3    albuterol (PROVENTIL) (2.5 MG/3ML) 0.083% nebulizer solution Take 3 mLs by nebulization every 6 hours as needed for Wheezing or Shortness of Breath 125 each 0    albuterol sulfate HFA (PROAIR HFA) 108 (90 Base) MCG/ACT inhaler Inhale 2 puffs into the lungs every 6 hours as needed for Wheezing or Shortness of Breath (cough) 8 g 3    Misc.  Devices (ADJUST FOLD CANE/YORK HANDLE) MISC Use daily for walking 1 each 0    Handicap Placard MISC by Does not apply route Expires on 12/30/25 1 each 0    allopurinol (ZYLOPRIM) 300 MG tablet Take 1 tablet by mouth daily Per rheumatologist 90 tablet 3    atorvastatin (LIPITOR) 40 MG tablet Take 1 tablet by mouth once daily 90 tablet 3    bumetanide (BUMEX) 1 MG tablet Take 1 tablet by mouth 2 times daily 180 tablet 3    chlorhexidine (HIBICLENS) 4 % external liquid for decontamination AND WASH LEGS EVERY  mL 2    desloratadine (CLARINEX) 5 MG tablet Take 1 tablet by mouth once daily 90 tablet 3    FreeStyle Lancets MISC 1 each by Does not apply route daily Patient needs to contact office before any further refills will be approved 90 each 3    magnesium oxide (MAG-OX) 400 (241.3 Mg) MG TABS tablet Take 1 tablet by mouth twice daily 180 tablet 3    metoprolol tartrate (LOPRESSOR) 50 MG tablet Take 1 tablet by mouth 2 times daily 180 tablet 3    miconazole (MICOTIN) 2 % powder Apply topically 2 times daily UNDER THE SKIN FOLDS LONG TERM 45 g 1    Multiple Vitamins-Minerals (THERAPEUTIC MULTIVITAMIN-MINERALS) tablet Take 1 tablet by mouth daily 90 tablet 3    Bismuth Tribromoph-Petrolatum (XEROFORM PETROLATUM PATCH 2\"X2\") PADS external pads Apply 1 each topically daily 30 each 2    blood glucose test strips (FREESTYLE LITE) strip 1 each by In Vitro route daily As needed. 100 each 3    Blood Pressure KIT 1 kit by Does not apply route three times daily 1 kit 0    blood glucose monitor kit and supplies 1 kit by Other route three times daily Dispense Butterfly Elite CBG Device 1 kit 0     No current facility-administered medications on file prior to encounter. REVIEW OF SYSTEMS    Pertinent items are noted in HPI.      Objective:      BP (!) 186/79   Pulse 76   Temp 96.5 °F (35.8 °C) (Tympanic)   Resp 20   Ht 4' 11\" (1.499 m)   Wt 135 lb (61.2 kg)   BMI 27.27 kg/m²     Wt Readings from Last 3 Encounters:   05/24/22 135 lb (61.2 kg)   05/20/22 135 lb (61.2 kg)   05/18/22 135 lb (61.2 kg)       PHYSICAL EXAM    General Appearance: alert and oriented to person, place and time, well-developed and well-nourished, in no acute distress  Skin: warm and dry, no rash, bilateral lower leg wounds   Head: normocephalic and atraumatic  Eyes: pupils equal, round, extraocular eye movements intact, and conjunctivae normal  Pulmonary/Chest: normal air movement, no respiratory distress  Extremities: no cyanosis and no clubbing. Bilateral lower leg edema   Musculoskeletal: no joint swelling, deformity or tenderness  Neurologic: gait, coordination normal and speech normal      Assessment:     Problem List Items Addressed This Visit     Bilateral lower extremity edema    Relevant Orders    Initiate Outpatient Wound Care Protocol    Cellulitis of both lower extremities    Leg ulcer, left, with fat layer exposed ([address]    Relevant Orders    Initiate Outpatient Wound Care Protocol    Leg ulcer, right, with fat layer exposed ([address] - Primary    Relevant Orders    Initiate Outpatient Wound Care Protocol    Lymphedema    Relevant Orders    Initiate Outpatient Wound Care Protocol    Venous insufficiency of both lower extremities    Relevant Orders    Initiate Outpatient Wound Care Protocol           Procedure Note  Indications:  Based on my examination of this patient's wound(s)/ulcer(s) today, debridement is not required to promote healing and evaluate the wound base. Wound Measurements:  Wound/Ulcer Descriptions are Pre Debridement except measurements:    Wound 05/20/22 Right Wound #1 right lower  leg cluster- converged with #2 (Active)   Wound Image    05/24/22 1520   Wound Etiology Venous 05/24/22 1520   Wound Cleansed Cleansed with saline; Soap and water 05/24/22 1520   Wound Length (cm) 12 cm 05/24/22 1520   Wound Width (cm) 29 cm 05/24/22 1520   Wound Depth (cm) 0.1 cm 05/24/22 1520   Wound Surface Area (cm^2) 348 cm^2 05/24/22 1520   Change in Wound Size % (l*w) -2220 05/24/22 1520   Wound Volume (cm^3) 34.8 cm^3 05/24/22 1520   Wound Healing % -2220 05/24/22 1520   Post-Procedure Length (cm) 12 cm 05/24/22 1520   Post-Procedure Width (cm) 29 cm 05/24/22 1520   Post-Procedure Depth (cm) 0.1 cm 05/24/22 1520   Post-Procedure Surface Area (cm^2) 348 cm^2 05/24/22 1520   Post-Procedure Volume (cm^3) 34.8 cm^3 05/24/22 1520   Wound Assessment Fluid filled blister;Pink/red;Ruptured blister;Slough 05/24/22 1520   Drainage Amount Moderate 05/24/22 1520   Drainage Description Serosanguinous; Serous; Yellow 05/24/22 1520   Odor None 05/24/22 1520   Marian-wound Assessment Fragile; Non-blanchable erythema 05/24/22 1520   Margins Attached edges 05/24/22 1520   Number of days: 4       Wound 05/20/22 Left;Proximal Wound # 4 Left lower lat leg (Active)   Wound Image   05/24/22 1520   Wound Etiology Venous 05/24/22 1520   Wound Cleansed Cleansed with saline; Soap and water 05/24/22 1520   Wound Length (cm) 12 cm 05/24/22 1520   Wound Width (cm) 12 cm 05/24/22 1520   Wound Depth (cm) 0.1 cm 05/24/22 1520   Wound Surface Area (cm^2) 144 cm^2 05/24/22 1520   Change in Wound Size % (l*w) -872.97 05/24/22 1520   Wound Volume (cm^3) 14.4 cm^3 05/24/22 1520   Wound Healing % -873 05/24/22 1520   Post-Procedure Length (cm) 12 cm 05/24/22 1520   Post-Procedure Width (cm) 12 cm 05/24/22 1520   Post-Procedure Depth (cm) 0.1 cm 05/24/22 1520   Post-Procedure Surface Area (cm^2) 144 cm^2 05/24/22 1520   Post-Procedure Volume (cm^3) 14.4 cm^3 05/24/22 1520   Wound Assessment Devitalized tissue;Pink/red;Ruptured blister 05/24/22 1520   Drainage Amount Large 05/24/22 1520   Drainage Description Sanguinous; Serosanguinous; Serous 05/24/22 1520   Odor None 05/24/22 1520   Marian-wound Assessment Fragile; Non-blanchable erythema; Warm 05/24/22 1520   Margins Attached edges 05/24/22 1520   Number of days: 4          Plan:     Treatment Note please see Discharge Instructions    Written patient dismissal instructions given to patient and signed by patient or POA.            Electronically signed by MAL Mcneil CNP on 5/24/2022 at 4:25 PM

## 2022-05-24 NOTE — PROGRESS NOTES
[address]:     [facility] [place]:     [address] [facility]  [address] [address]  [phone]  [phone]    Patient Information:      Christ Still  [address] [phone]   : 1950  AGE: 66 y.o. GENDER: female   EPISODE DATE: 2022    Insurance:      PRIMARY INSURANCE:  Plan: MEDICARE PART B  Coverage: MEDICARE  Effective Date: 2015  Group Number: [unfilled]  Subscriber Number: [id number] - (Medicare)    Payor/Plan Subscr  Sex Relation Sub. Ins. ID Effective Group Num   1. [address] 1950 Female Self [id number] 1/1/15                                    [address] [address]   2. [address] 1950 Female Self [id number] 1/1/15                                    [address] [address]       Patient Wound Information:      Problem List Items Addressed This Visit        Circulatory    Venous insufficiency of both lower extremities    Relevant Orders    Initiate Outpatient Wound Care Protocol       Other    Bilateral lower extremity edema    Relevant Orders    Initiate Outpatient Wound Care Protocol    Leg ulcer, left, with fat layer exposed [facility])    Relevant Orders    Initiate Outpatient Wound Care Protocol    Lymphedema    Relevant Orders    Initiate Outpatient Wound Care Protocol    Leg ulcer, right, with fat layer exposed ([facility] [address] - Primary    Relevant Orders    Initiate Outpatient Wound Care Protocol    Cellulitis of both lower extremities          WOUNDS REQUIRING DRESSING SUPPLIES:     Wound 22 Right Wound #1 right lower  leg cluster- converged with #2 (Active)   Wound Image    22 1520   Wound Etiology Venous 22 1520   Wound Cleansed Cleansed with saline; Soap and water 22 1520   Wound Length (cm) 12 cm 22 1520   Wound Width (cm) 29 cm 22 1520   Wound Depth (cm) 0.1 cm 05/24/22 1520   Wound Surface Area (cm^2) 348 cm^2 05/24/22 1520   Change in Wound Size % (l*w) -2220 05/24/22 1520   Wound Volume (cm^3) 34.8 cm^3 05/24/22 1520   Wound Healing % -2220 05/24/22 1520   Post-Procedure Length (cm) 12 cm 05/24/22 1520   Post-Procedure Width (cm) 29 cm 05/24/22 1520   Post-Procedure Depth (cm) 0.1 cm 05/24/22 1520   Post-Procedure Surface Area (cm^2) 348 cm^2 05/24/22 1520   Post-Procedure Volume (cm^3) 34.8 cm^3 05/24/22 1520   Wound Assessment Fluid filled blister;Pink/red;Ruptured blister;Slough 05/24/22 1520   Drainage Amount Moderate 05/24/22 1520   Drainage Description Serosanguinous; Serous; Yellow 05/24/22 1520   Odor None 05/24/22 1520   Marian-wound Assessment Fragile; Non-blanchable erythema 05/24/22 1520   Margins Attached edges 05/24/22 1520   Number of days: 4       Wound 05/20/22 Left;Proximal Wound # 4 Left lower lat leg (Active)   Wound Image   05/24/22 1520   Wound Etiology Venous 05/24/22 1520   Wound Cleansed Cleansed with saline; Soap and water 05/24/22 1520   Wound Length (cm) 12 cm 05/24/22 1520   Wound Width (cm) 12 cm 05/24/22 1520   Wound Depth (cm) 0.1 cm 05/24/22 1520   Wound Surface Area (cm^2) 144 cm^2 05/24/22 1520   Change in Wound Size % (l*w) -872.97 05/24/22 1520   Wound Volume (cm^3) 14.4 cm^3 05/24/22 1520   Wound Healing % -873 05/24/22 1520   Post-Procedure Length (cm) 12 cm 05/24/22 1520   Post-Procedure Width (cm) 12 cm 05/24/22 1520   Post-Procedure Depth (cm) 0.1 cm 05/24/22 1520   Post-Procedure Surface Area (cm^2) 144 cm^2 05/24/22 1520   Post-Procedure Volume (cm^3) 14.4 cm^3 05/24/22 1520   Wound Assessment Devitalized tissue;Pink/red;Ruptured blister 05/24/22 1520   Drainage Amount Large 05/24/22 1520   Drainage Description Sanguinous; Serosanguinous; Serous 05/24/22 1520   Odor None 05/24/22 1520   Marian-wound Assessment Fragile; Non-blanchable erythema; Warm 05/24/22 1520   Margins Attached edges 05/24/22 1520   Number of days: 4          Supplies Requested :      WOUND #: 1 and 2   PRIMARY DRESSING:  Other: silvercel 4x8 to each leg   Cover and Secure with:  Other wrap each leg with 3 abd pads then wrap with kerlix apply light ace     FREQUENCY OF DRESSING CHANGES:  Daily       ADDITIONAL ITEMS:  [] Gloves Small  [x] Gloves Medium [] Gloves Large [] Gloves XLarge  [] Tape 1\" [x] Tape 2\" [] Tape 3\"  [] Medipore Tape  [] Saline  [] Skin Prep   [] Adhesive Remover   [] Cotton Tip Applicators   [] Other:    Patient Wound(s) Debrided: [x] Yes if yes please add date 5/21/22   [] No    Debribement Type: Non-excisional/Selective    Is the patient currently on an antibiotic for their Wound(s): [x] Yes if yes please add name and dose Doxycycline   [] No    Patient currently being seen by Home Health: [] Yes   [x] No    Duration for needed supplies:  []15  [x]30  []60  []90 Days         Provider Information:      PROVIDER'S NAME: Celso RESENDEZ-ROCÍO    [facility]-[id number]

## 2022-05-25 ENCOUNTER — TELEMEDICINE (OUTPATIENT)
Dept: FAMILY MEDICINE CLINIC | Age: 72
End: 2022-05-25
Payer: MEDICARE

## 2022-05-25 ENCOUNTER — INITIAL CONSULT (OUTPATIENT)
Dept: ONCOLOGY | Age: 72
End: 2022-05-25
Payer: MEDICARE

## 2022-05-25 ENCOUNTER — TELEPHONE (OUTPATIENT)
Dept: ONCOLOGY | Age: 72
End: 2022-05-25

## 2022-05-25 ENCOUNTER — TELEPHONE (OUTPATIENT)
Dept: FAMILY MEDICINE CLINIC | Age: 72
End: 2022-05-25

## 2022-05-25 VITALS
TEMPERATURE: 96.9 F | WEIGHT: 137 LBS | SYSTOLIC BLOOD PRESSURE: 218 MMHG | HEART RATE: 66 BPM | BODY MASS INDEX: 27.62 KG/M2 | HEIGHT: 59 IN | DIASTOLIC BLOOD PRESSURE: 65 MMHG

## 2022-05-25 DIAGNOSIS — I12.9 BENIGN HYPERTENSION WITH CKD (CHRONIC KIDNEY DISEASE) STAGE III (HCC): ICD-10-CM

## 2022-05-25 DIAGNOSIS — D50.9 IRON DEFICIENCY ANEMIA, UNSPECIFIED IRON DEFICIENCY ANEMIA TYPE: Primary | ICD-10-CM

## 2022-05-25 DIAGNOSIS — N18.30 BENIGN HYPERTENSION WITH CKD (CHRONIC KIDNEY DISEASE) STAGE III (HCC): ICD-10-CM

## 2022-05-25 DIAGNOSIS — I48.0 PAROXYSMAL ATRIAL FIBRILLATION (HCC): ICD-10-CM

## 2022-05-25 DIAGNOSIS — I50.32 CHRONIC DIASTOLIC CHF (CONGESTIVE HEART FAILURE) (HCC): Primary | ICD-10-CM

## 2022-05-25 DIAGNOSIS — R54 FRAIL ELDERLY: ICD-10-CM

## 2022-05-25 DIAGNOSIS — L97.922 LEG ULCER, LEFT, WITH FAT LAYER EXPOSED (HCC): ICD-10-CM

## 2022-05-25 DIAGNOSIS — I50.9 CONGESTIVE HEART FAILURE, UNSPECIFIED HF CHRONICITY, UNSPECIFIED HEART FAILURE TYPE (HCC): ICD-10-CM

## 2022-05-25 DIAGNOSIS — M79.606 ACUTE PAIN OF LOWER EXTREMITY, UNSPECIFIED LATERALITY: ICD-10-CM

## 2022-05-25 DIAGNOSIS — N18.31 STAGE 3A CHRONIC KIDNEY DISEASE (HCC): ICD-10-CM

## 2022-05-25 DIAGNOSIS — L97.912 LEG ULCER, RIGHT, WITH FAT LAYER EXPOSED (HCC): ICD-10-CM

## 2022-05-25 PROCEDURE — 1090F PRES/ABSN URINE INCON ASSESS: CPT | Performed by: FAMILY MEDICINE

## 2022-05-25 PROCEDURE — 1124F ACP DISCUSS-NO DSCNMKR DOCD: CPT | Performed by: FAMILY MEDICINE

## 2022-05-25 PROCEDURE — 1090F PRES/ABSN URINE INCON ASSESS: CPT | Performed by: INTERNAL MEDICINE

## 2022-05-25 PROCEDURE — G8400 PT W/DXA NO RESULTS DOC: HCPCS | Performed by: FAMILY MEDICINE

## 2022-05-25 PROCEDURE — 99214 OFFICE O/P EST MOD 30 MIN: CPT | Performed by: FAMILY MEDICINE

## 2022-05-25 PROCEDURE — G8427 DOCREV CUR MEDS BY ELIG CLIN: HCPCS | Performed by: INTERNAL MEDICINE

## 2022-05-25 PROCEDURE — 99204 OFFICE O/P NEW MOD 45 MIN: CPT | Performed by: INTERNAL MEDICINE

## 2022-05-25 PROCEDURE — G8417 CALC BMI ABV UP PARAM F/U: HCPCS | Performed by: INTERNAL MEDICINE

## 2022-05-25 PROCEDURE — 3017F COLORECTAL CA SCREEN DOC REV: CPT | Performed by: FAMILY MEDICINE

## 2022-05-25 PROCEDURE — G8427 DOCREV CUR MEDS BY ELIG CLIN: HCPCS | Performed by: FAMILY MEDICINE

## 2022-05-25 RX ORDER — OXYCODONE HYDROCHLORIDE 5 MG/1
5 TABLET ORAL EVERY 8 HOURS PRN
Qty: 9 TABLET | Refills: 0 | Status: SHIPPED | OUTPATIENT
Start: 2022-05-25 | End: 2022-05-25 | Stop reason: SDUPTHER

## 2022-05-25 RX ORDER — OXYCODONE HYDROCHLORIDE 5 MG/1
5 TABLET ORAL EVERY 8 HOURS PRN
Qty: 9 TABLET | Refills: 0 | Status: SHIPPED | OUTPATIENT
Start: 2022-05-25 | End: 2022-05-28

## 2022-05-25 RX ORDER — BUMETANIDE 1 MG/1
1 TABLET ORAL
Qty: 180 TABLET | Refills: 3
Start: 2022-05-25 | End: 2022-06-08 | Stop reason: ALTCHOICE

## 2022-05-25 ASSESSMENT — ENCOUNTER SYMPTOMS
NAUSEA: 0
WHEEZING: 0
CHEST TIGHTNESS: 0
CONSTIPATION: 0
ABDOMINAL PAIN: 0
DIARRHEA: 0
ABDOMINAL DISTENTION: 0
COUGH: 0
VOMITING: 0

## 2022-05-25 NOTE — TELEPHONE ENCOUNTER
Pt will get labs at [facility]  rv in 3 weeks    Pt was given lab orders     RV scheduled 6/15/22 @ 9:30am    Pt was given AVS and appointment schedule    Electronically signed by Soham Blum on 5/25/2022 at 3:24 PM

## 2022-05-25 NOTE — TELEPHONE ENCOUNTER
I added acute pain of the lower extremity-please let the pharmacist know    Diagnosis Association: Chronic diastolic CHF (congestive heart failure) ([facility]) (I50.32); Paroxysmal atrial fibrillation ([facility]) (I48.0); Benign hypertension with CKD (chronic kidney disease) stage III ([facility]) (I12.9 , N18.30); Leg ulcer, left, with fat layer exposed ([address] (B65.956); Leg ulcer, right, with fat layer exposed ([address] (L97.912);  Acute pain of lower extremity, unspecified laterality (M79.606)   Original Order:  oxyCODONE (ROXICODONE) 5 MG immediate release tablet [[id number]]           If unable to fill, it is okay, please notify the family member as we spoke on visit, continue double Bumex which was increased, elevation, antibiotic, and the Tylenol but not to take so much anymore maximum 500 mg 4 times a day

## 2022-05-25 NOTE — TELEPHONE ENCOUNTER
Please advise daughter to check with pharmacy again, if they do not cover for the pain medication, then I cannot do anything about the pain medication  Due to age over 72, pain medications are better restricted, due to high risk medication  To continue Tylenol 500 every 6 hours, the antibiotic and the water.   And the pain will get better

## 2022-05-25 NOTE — TELEPHONE ENCOUNTER
PHARMACY CALLED STATING THEY RECEIVED NEW SCRIPT BUT STATES THE DX IS STILL THE SAME AND CANT BE USED. IS THERE ANOTHER DX FOR THIS?  PLEASE ADVISE

## 2022-05-25 NOTE — PROGRESS NOTES
2022    TELEHEALTH EVALUATION -- Audio/Visual (During [facility]- public health emergency)      Angela Wahl (:  1950) is a 66 y.o. female,Established patient, here for evaluation of the following chief complaint(s): Congestive Heart Failure and Hypertension        ASSESSMENT/PLAN:    1. Chronic diastolic CHF (congestive heart failure) (HCC)  Likely worsening with a recent bout of acute congestive heart failure, due to her sharp increasing BNP, from 4498 on 2022 to 7095 on 2022  Lab Results   Component Value Date    LVEF 55 2022   Okay to increase Bumex per nephrologist currently taking 2 mg in the morning and 1 mg in the evening, increased from prior 1 mg twice a day. To continue with metoprolol 50 Mg twice a day    -     bumetanide (BUMEX) 1 MG tablet; Take 1 tablet by mouth Daily with supper Takes 2 mg in am and 1 pm, Disp-180 tablet, R-3NO PRINT  -     oxyCODONE (ROXICODONE) 5 MG immediate release tablet; Take 1 tablet by mouth every 8 hours as needed for Pain for up to 3 days. Intended supply: 3 days. Take lowest dose possible to manage pain, Disp-9 tablet, R-0Normal  She will have blood work in a week, BMP ordered by nephrologist, and another BMP in 2 weeks  I confirmed with the daughter and the patient that the orders are in the computer, they just need to go to the lab weekly x2.    2. Paroxysmal atrial fibrillation (HCC)  Stable    On amiodarone 200 mg daily, metoprolol 50 Mg twice a day for rate control, and chronic anticoagulation with Eliquis 2.5 mg twice a day  Cannot take NSAIDs for pain  -     oxyCODONE (ROXICODONE) 5 MG immediate release tablet; Take 1 tablet by mouth every 8 hours as needed for Pain for up to 3 days. Intended supply: 3 days. Take lowest dose possible to manage pain, Disp-9 tablet, R-0Normal  To follow-up with cardiologist as scheduled  3.  Benign hypertension with CKD (chronic kidney disease) stage III (HCC)  Slightly worsening CKD stage III  GFR 30 on 5/18/2022, was 36 on 4/28/2022  Inadequately controlled hypertension, she does have an appointment today in person, advised to have her blood pressure check at that time  Advised that increase Bumex will also help improve the blood pressure  Also improving pain level will also improve her blood pressure  -     bumetanide (BUMEX) 1 MG tablet; Take 1 tablet by mouth Daily with supper Takes 2 mg in am and 1 pm, Disp-180 tablet, R-3NO PRINT  -     oxyCODONE (ROXICODONE) 5 MG immediate release tablet; Take 1 tablet by mouth every 8 hours as needed for Pain for up to 3 days. Intended supply: 3 days. Take lowest dose possible to manage pain, Disp-9 tablet, R-0Normal  Will have BP at heme/onc and if high will increase meds  Bumex increased   I ordered the blood pressure machine for her, she would benefit from having a blood pressure machine and to self monitor her blood pressure which is not controlled      4. Leg ulcer, left, with fat layer exposed ([address]  Ongoing, large, worsening, going to wound care, continue doxycycline recently started  Careful review of urgent symptoms and need for immediate medical attention if condition worsens. Patient expressed understanding. Advised to go to ED if severe symptoms develop. Patient expressed understanding. Cut down on Tylenol to no more than 500 mg every 6 hours, keep legs elevated and wrapped    -For pain control    oxyCODONE (ROXICODONE) 5 MG immediate release tablet; Take 1 tablet by mouth every 8 hours as needed for Pain for up to 3 days. Intended supply: 3 days. Take lowest dose possible to manage pain, Disp-9 tablet, R-0Normal  Advised to increase proteins in diet  5. Leg ulcer, right, with fat layer exposed ([address]  Ongoing, large, worsening, going to wound care, continue doxycycline recently started  Careful review of urgent symptoms and need for immediate medical attention if condition worsens. Patient expressed understanding.  Advised to go to ED if severe symptoms develop. Patient expressed understanding. Cut down on Tylenol to no more than 500 mg every 6 hours, keep legs elevated and wrapped    -For pain control     oxyCODONE (ROXICODONE) 5 MG immediate release tablet; Take 1 tablet by mouth every 8 hours as needed for Pain for up to 3 days. Intended supply: 3 days. Take lowest dose possible to manage pain, Disp-9 tablet, R-0Normal  Advised to increase proteins in diet    6. Acute pain of lower extremity, unspecified laterality  Patient describes the pain is severe, taking Tylenol \" 2 tabs x 500 mg every 4 hrs\" and still wakes up with a lot of pain. I explained to her that doxycycline, improving the swelling, keeping legs elevated, should help improve the pain. Advised not to take more than 500 mg Tylenol every 6 hours. Short-term oxycodone given, advised to try to take no more than 1 a day. She does have appointment with wound care coming up  -     oxyCODONE (ROXICODONE) 5 MG immediate release tablet; Take 1 tablet by mouth every 8 hours as needed for Pain for up to 3 days. Intended supply: 3 days. Take lowest dose possible to manage pain, Disp-9 tablet, R-0Normal      Patient and her daughter decline home care, they want to go to wound care in outpatient. Orders Placed This Encounter   Medications    bumetanide (BUMEX) 1 MG tablet     Sig: Take 1 tablet by mouth Daily with supper Takes 2 mg in am and 1 pm     Dispense:  180 tablet     Refill:  3    DISCONTD: oxyCODONE (ROXICODONE) 5 MG immediate release tablet     Sig: Take 1 tablet by mouth every 8 hours as needed for Pain for up to 3 days. Intended supply: 3 days. Take lowest dose possible to manage pain     Dispense:  9 tablet     Refill:  0     Reduce doses taken as pain becomes manageable    oxyCODONE (ROXICODONE) 5 MG immediate release tablet     Sig: Take 1 tablet by mouth every 8 hours as needed for Pain for up to 3 days. Intended supply: 3 days.  Take lowest dose possible to manage pain     Dispense:  9 tablet     Refill:  0     Reduce doses taken as pain becomes manageable    Blood Pressure KIT     Sig: Diagnosis: HTN. Needs to check blood pressure 1-2 times a day until stable, then once a day. Goal blood pressure less than 135/85, and above 110/60. Dispense:  1 kit     Refill:  0           Controlled Substance Monitoring:    Acute and Chronic Pain Monitoring:   RX Monitoring 2022   Periodic Controlled Substance Monitoring Possible medication side effects, risk of tolerance/dependence & alternative treatments discussed. ;No signs of potential drug abuse or diversion identified. ;Assessed functional status. Kelle Collier received counseling on the following healthy behaviors: nutrition, exercise and medication adherence  Reviewed prior labs and health maintenance  Discussed use, benefit, and side effects of prescribed medications. Barriers to medication compliance addressed. Patient given educational materials - see patient instructions  All patient questions answered. Patient voiced understanding. The patient's past medical,surgical, social, and family history as well as her current medications and allergies were reviewed as documented in today's encounter. Medications, labs, diagnostic studies, consultations and follow-up as documented in this encounter. Return for KEEP APPT.     Data Unavailable    Future Appointments   Date Time Provider Angela Reyez   2022  2:15 PM Aimee Oreilly  [place] [id number]   2022  1:00 PM [facility] VASCULAR RM [address]   2022  1:15 PM MAL Saleh - CNP [facility]   2022  2:00 PM [facility]   2022  2[facility]30 PM [facility]   2022  2:30 PM Michelle Ortega MD fp sc [id number]   2022  3:30 PM Michelle Ortega MD fp sc [id number]         SUBJECTIVE/OBJECTIVE:  Ivet Grajeda (:  1950) has requested an audio/video evaluation for the following concern(s):Congestive Heart Failure and Hypertension    During this virtual visit patient is coming with Nolberto Rincon, her daughter. Patient is very hard of hearing, everything I say has to be repeated by her daughter. Patient reports \"I'm in pain in the legs\", \" I'm going to wound care\" for having ulcers on both legs. Patient reports \"the bandages were removed on Friday, and everything was gone\", referring to the prior ulcers which healed. Then she says \" I had labs at [facility]", and to call her to go to the emergency room due to very high BNP and acute on chronic congestive heart failure. I personally called her and informed her to go to the emergency room as her daughter was worried about her mom's confusion at that time. The blood work that she is talking about, was done on 5/18/2022 showing sharp increase of proBNP 7095 on 5/18/2022, was 4498 on 4/28/2022, slightly worsening chronic kidney disease, GFR 30, mildly low proteins, 6.2, low albumin 3.3, increased alkaline phosphatase 152, normal CBC but with mild increased neutrophils. Patient says she \"went to ED and they released me, and I was fine\", but then \"Overnight blisters came on and open up overnight\". She is confused why the symptoms did not show up in the emergency room and showed up overnight, while at home. Patient reports now she has ulcers on her legs again since 5/19/2022, she was seen at wound care on 5/20/2022 first time for this episode of ulcerations on her legs. Per note from wound care from 5/20/2022, by Celso Hamm CNP, it says that patient is here for follow-up on bilateral lower leg ulcerations, was healed 3 days ago, but blisters developed later and have opened up and draining large amount of serous fluid. Per notes from wound care, she placed her on Unna boots due to venous insufficiency of both lower extremities, lymphedema, leg ulcers left and right, with fat layer exposed.   Note per from wound care from 5/20/2022, right wound lateral leg was 7.5 cm x 2 cm x 0.1 cm, and left lower leg, lateral side 3.7 cm x 4 cm x 0.1 cm    Went back to wound care yesterday, she says she was started on new antibiotics . Prior [facility] reviewed [facility], from wound care on 5/24/2022, she was started on doxycycline 100 mg twice a day for 10 days  On the right lateral leg 12 cm x 29 cm x 1 mm , also per picture from wound care on 5/24/22  On the left lateral leg 12 cm x 12 cm x 1 mm per picture and records     Patient's daughters and patient say they call nephrology, and Bumex was increased 2 mg in am and 1 mg pm per nephrology   Previously she was taking Bumex 1 mg BID. Previously during an admission Aldactone and losartan were stopped due to worsening chronic kidney disease stage III. Zeus Max Her daughter says she has been sleepign a lot, but has been eating okay. Her daughter says she is not confused anymore. Her daughter says she has been using hibiclens, but patient says she is currently overwrapped and she is not allowed to open the wrap, most likely she does have Unna boots      Regarding pain level patient says she is in a lot of pain, taking acetaminophen 2 tablets x 500 mg every 4 hrs, and the daughter confirms the dosage she is taking. Advised to cut down on Tylenol not to take more than 500 mg every 6 hours. Pain worse in am, wakes her up, she reports intensity of pain is 8/10 right now. She is requesting something for pain. She denies fever, chills, night sweats. Patient has known congestive heart failure, chronic kidney disease stage III, hypertension, paroxysmal atrial fibrillation. She is on chronic anticoagulation with apixaban. She is also on amiodarone and beta-blocker metoprolol. Patient is concerned that when going to the visit, blood pressure is high. She denies chest pain or palpitations. .  She does report worsening leg swelling, she reports shortness of breath is at baseline with usual activities in the house. Most recent blood pressure was high with systolic high blood pressure uncontrolled, diastolic blood pressure controlled  BP Readings from Last 3 Encounters:   05/24/22 (!) 186/79   05/20/22 (!) 175/60   05/18/22 (!) 164/46   Heart rate is controlled  Pulse Readings from Last 3 Encounters:   05/25/22 66   05/24/22 76   05/20/22 61     Normal ejection fraction. Lab Results   Component Value Date    LVEF 55 02/07/2022               Review of Systems   Constitutional: Positive for fatigue and unexpected weight change (fluctuating). Negative for activity change, appetite change, chills, diaphoresis and fever. HENT: Positive for hearing loss (she is very hard of hearing). Respiratory: Positive for shortness of breath (FLORES). Negative for cough, chest tightness and wheezing. Cardiovascular: Positive for leg swelling. Negative for chest pain and palpitations. Gastrointestinal: Negative for abdominal distention, abdominal pain, constipation, diarrhea, nausea and vomiting. Genitourinary: Positive for frequency (from Bumex) and urgency. Musculoskeletal: Positive for gait problem. Ambulates with walker or wheelchair. Skin: Positive for rash (legs) and wound (legs). Hematological: Bruises/bleeds easily. Psychiatric/Behavioral: Positive for sleep disturbance (sleeping a lot). The patient is nervous/anxious. Prior to Visit Medications    Medication Sig Taking?  Authorizing Provider   doxycycline hyclate (VIBRA-TABS) 100 MG tablet Take 1 tablet by mouth 2 times daily for 10 days Yes Yolie Henderson APRN - CNP   bumetanide (BUMEX) 2 MG tablet Take 1 tablet by mouth every morning Will take an additional 1 mg in the pm Yes Miguel Cary MD   saline nasal gel (AYR) GEL by Nasal route as needed for Congestion For nose bleeds, 2-3 times a day intranasal prn Yes Catherine Douglas MD   vitamin D (ERGOCALCIFEROL) 1.25 MG (88688 UT) CAPS capsule Take 1 capsule by mouth once a week Sundays Yes Belvin Cabot, MD   saline nasal gel (AYR) GEL by Nasal route as needed for Congestion (and nose bleeds, 2-3 times a day) Yes Belvin Cabot, MD   amiodarone (CORDARONE) 200 MG tablet Take 1 tablet by mouth daily Yes Belvin Cabot, MD   ferrous sulfate (IRON 325) 325 (65 Fe) MG tablet Take 1 tablet by mouth daily (with breakfast) Yes Corrie Wharton MD   apixaban (ELIQUIS) 2.5 MG TABS tablet Take 1 tablet by mouth 2 times daily Yes Wm Clarke MD   Nebulizers (COMPRESSOR/NEBULIZER) MISC Nebulizer with compressor. Disposable Med Nebs 2 /month. Reusable Med Nebs 1 per 6 months. Aerosol Mask 1 per month. Replacement Filters 2 per month. All other related supplies as needed per month. Medications being used: Albuterol . 083 unit dose vial. Frequency: every 6 hrs x 4/day . Length of Need: 1 Yes Corrie Wharton MD   albuterol (PROVENTIL) (2.5 MG/3ML) 0.083% nebulizer solution Take 3 mLs by nebulization every 6 hours as needed for Wheezing or Shortness of Breath Yes Corrie Wharton MD   albuterol sulfate HFA (PROAIR HFA) 108 (90 Base) MCG/ACT inhaler Inhale 2 puffs into the lungs every 6 hours as needed for Wheezing or Shortness of Breath (cough) Yes Corrie Wharton MD   Misc.  Devices (ADJUST FOLD CANE/YORK HANDLE) MISC Use daily for walking Yes Corrie Wharton MD   Handicap Placard MISC by Does not apply route Expires on 12/30/25 Yes Corrie Wharton MD   allopurinol (ZYLOPRIM) 300 MG tablet Take 1 tablet by mouth daily Per rheumatologist Yes Belvin Cabot, MD   atorvastatin (LIPITOR) 40 MG tablet Take 1 tablet by mouth once daily Yes Belvin Cabot, MD   bumetanide (BUMEX) 1 MG tablet Take 1 tablet by mouth 2 times daily Yes Belvin Cabot, MD   chlorhexidine (HIBICLENS) 4 % external liquid for decontamination AND [address] LEGS EVERY DAY Yes Belvin Cabot, MD   desloratadine (CLARINEX) 5 MG tablet Take 1 tablet by mouth once daily Yes Belvin Cabot, MD FreeStyle Lancets MISC 1 each by Does not apply route daily Patient needs to contact office before any further refills will be approved Yes Diana Metz MD   magnesium oxide (MAG-OX) 400 (241.3 Mg) MG TABS tablet Take 1 tablet by mouth twice daily Yes Diana Metz MD   metoprolol tartrate (LOPRESSOR) 50 MG tablet Take 1 tablet by mouth 2 times daily Yes Diana Metz MD   miconazole (MICOTIN) 2 % powder Apply topically 2 times daily UNDER THE SKIN FOLDS LONG TERM Yes Diana Metz MD   Multiple Vitamins-Minerals (THERAPEUTIC MULTIVITAMIN-MINERALS) tablet Take 1 tablet by mouth daily Yes Diana Metz MD   Bismuth Tribromoph-Petrolatum (XEROFORM PETROLATUM PATCH 2\"X2\") PADS external pads Apply 1 each topically daily Yes MAL Moreau - CNP   blood glucose test strips (FREESTYLE LITE) strip 1 each by In Vitro route daily As needed.  Yes Diana Metz MD   Blood Pressure KIT 1 kit by Does not apply route three times daily Yes Chantal Cordova MD   blood glucose monitor kit and supplies 1 kit by Other route three times daily Dispense Butterfly Elite CBG Device Yes Demarcus Bhandari MD       Social History     Tobacco Use    Smoking status: Former Smoker     Packs/day: 0.25     Years: 10.00     Pack years: 2.50     Quit date: 2000     Years since quittin.4    Smokeless tobacco: Never Used   Vaping Use    Vaping Use: Never used   Substance Use Topics    Alcohol use: Not Currently     Alcohol/week: 0.0 standard drinks    Drug use: No          PHYSICAL EXAMINATION:    Vital Signs: (As obtained by patient/caregiver or practitioner observation)  -vital signs stable and within normal limits except overweight per BMI 27.67 kg/M2  Patient-Reported Vitals 2022   Patient-Reported Weight 135 lbs   Patient-Reported Height 4 ft 11 in   Patient-Reported Temperature -       Wt Readings from Last 3 Encounters:   22 137 lb (62.1 kg)   22 135 lb (61.2 kg) 05/20/22 135 lb (61.2 kg)         Intensity of pain is 8/10      Constitutional: [x] Appears well-developed and well-nourished [x] No apparent distress      [x] Abnormal-very hard of hearing. Mental status  [x] Alert and awake  [x] Oriented to person/place/time [x]Able to follow commands      Eyes:  EOM    [x]  Normal  [] Abnormal-  Sclera  [x]  Normal  [] Abnormal -         Discharge [x]  None visible  [] Abnormal -    HENT:   [x] Normocephalic, atraumatic. [] Abnormal   [x] Mouth/Throat: Mucous membranes are moist.     External Ears [x] Normal  [] Abnormal-     Neck: [x] No visualized mass     Pulmonary/Chest: [x] Respiratory effort normal.  [x] No visualized signs of difficulty breathing or respiratory distress        [] Abnormal        Musculoskeletal:   [] Normal gait with no signs of ataxia         [x] Normal range of motion of neck        [x] Abnormal-ambulates with walker       Neurological:        [x] No Facial Asymmetry (Cranial nerve 7 motor function) (limited exam to video visit)          [x] No gaze palsy        [] Abnormal-            Skin:        [x] No significant exanthematous lesions or discoloration noted on facial skin         [x] Abnormal-bilateral legs swollen, erythematous based on the pictures in the media from 5/24/2022 from wound care visit, with bilateral ulcers.   On the right lateral leg ulcer 12 cm x 29 cm x 1 mm per measurement from wound care on 5/24/2022, the ulcer looks bright red per picture from wound care on 5/24/22  On the left lateral leg, ulcer 12 cm x 12 cm x 1 mm per measurement from wound care from 5/24/2022, picture in media             Psychiatric:      [x] No Hallucinations       [x]Mood is normal      [x]Behavior is normal      [x]Judgment is normal      [x]Thought content is normal       [] Abnormal-     Other pertinent observable physical exam findings-patient seems very concerned today about her uncontrolled pain    Due to this being a TeleHealth encounter, evaluation of the following organ systems is limited: Vitals/Constitutional/EENT/Resp/CV/GI//MS/Neuro/Skin/Heme-Lymph-Imm. I personally reviewed most recent labs reviewed with the patient and all questions fully answered. Worsening chronic kidney disease stage III, mild hyperglycemia, increased alkaline phosphatase, low proteins, low albumin, A1c normal  Otherwise labs within normal limits        Lab Results   Component Value Date    WBC 5.8 05/18/2022    HGB 12.4 05/18/2022    HCT 38.1 05/18/2022    MCV 94.6 05/18/2022     05/18/2022       Lab Results   Component Value Date     05/18/2022    K 4.2 05/18/2022     05/18/2022    CO2 26 05/18/2022    BUN 62 05/18/2022    CREATININE 1.70 05/18/2022    GLUCOSE 100 05/18/2022    CALCIUM 9.1 05/18/2022        Lab Results   Component Value Date    ALT 18 05/18/2022    AST 25 05/18/2022    ALKPHOS 152 (H) 05/18/2022    BILITOT 0.25 (L) 05/18/2022         Lab Results   Component Value Date    LABA1C 4.6 03/31/2022    LABA1C 5.8 11/24/2021    LABA1C 5.7 06/29/2021       Orders Placed This Encounter   Medications    bumetanide (BUMEX) 1 MG tablet     Sig: Take 1 tablet by mouth Daily with supper Takes 2 mg in am and 1 pm     Dispense:  180 tablet     Refill:  3    oxyCODONE (ROXICODONE) 5 MG immediate release tablet     Sig: Take 1 tablet by mouth every 8 hours as needed for Pain for up to 3 days. Intended supply: 3 days. Take lowest dose possible to manage pain     Dispense:  9 tablet     Refill:  0     Reduce doses taken as pain becomes manageable       No orders of the defined types were placed in this encounter.       Medications Discontinued During This Encounter   Medication Reason    saline nasal gel (AYR) GEL DUPLICATE    Bismuth Tribromoph-Petrolatum (XEROFORM PETROLATUM PATCH 2\"X2\") PADS external pads Therapy completed    bumetanide (BUMEX) 1 MG tablet REORDER              Kacie Neville, was evaluated through a synchronous (real-time) audio-video encounter. The patient (or guardian if applicable) is aware that this is a billable service, which includes applicable co-pays. The virtual visit was conducted with patient's (and/or legal guardian consent). Verbal consent to proceed has been obtained within the past 12 months. The visit was conducted pursuant to the emergency declaration under the [facility], [address] [place] [address] and the [employer] General Act. Patient identification was verified    Patient was with her daughterEmerita  Provider was located at primary practice location. The patient was located at home, in a state where the provider was licensed to provide care. On this date 5/25/2022 I have spent 39 minutes reviewing previous notes, test results and face to face with the patient discussing the diagnosis and importance of compliance with the treatment plan as well as documenting on the day of the visit and care coordination with her daughter. --Antonia Hemphill MD on 5/26/2022 at 9:43 AM    An electronic signature was used to authenticate this note.

## 2022-05-25 NOTE — TELEPHONE ENCOUNTER
oxyCODONE (ROXICODONE) 5 MG immediate release tablet [[id number]]     Order Details  Dose: 5 mg Route: Oral Frequency: EVERY 8 HOURS PRN for Pain   Dispense Quantity: 9 tablet Refills: 0    Note to Pharmacy: Reduce doses taken as pain becomes manageable         Sig: Take 1 tablet by mouth every 8 hours as needed for Pain for up to 3 days. Intended supply: 3 days. Take lowest dose possible to manage pain         Start Date: 05/25/22 End Date: 05/28/22   Written Date: 05/25/22 Expiration Date: 07/24/22   Earliest Fill Date: 05/25/22         Diagnosis Association: Chronic diastolic CHF (congestive heart failure) ([facility]) (I50.32); Paroxysmal atrial fibrillation (HCC) (I48.0); Benign hypertension with CKD (chronic kidney disease) stage III ([facility]) (I12.9 , N18.30); Leg ulcer, left, with fat layer exposed ([address] (Y64.049); Leg ulcer, right, with fat layer exposed ([address] (G50.107)   Pharmacy called in regard to dx: that was attached with pt medication above that was called in. Please update proper dx: to get medication to be filled.

## 2022-05-25 NOTE — PROGRESS NOTES
DIAGNOSIS:   Iron deficiency anemia  CKD  CHF  Elderly patient    CURRENT THERAPY:  Plan for work up    BRIEF CASE HISTORY:   Loretta Birch is a very pleasant 66 y.o. female who is referred to us for anemia. She had lab done 03/2022 which showed severe iron deficiency anemia with HGB of 4.4 and was admitted, she received 2 units of blood, colonoscopy and EGD were done and negative, capsule endoscopy has been recommended but she has not consented. During has that stay she also had paracentesis. She was discharged home but remains weak and needs wheelchair. She denies any hematuria, hematochezia, hematemesis, and melena. Her HGB has since normalized, she is not taking any oral iron. She has presented to the ER several times during 05/2022 for CHF, CKD. She has LE edema with ulcers and current cellulitis infection, she has been started on antibiotics. PAST MEDICAL HISTORY: has a past medical history of Acute CVA (cerebrovascular accident) ([address] Altered mental status, Arthritis, Brain mass, Cellulitis and abscess, Cellulitis and abscess of unspecified site, Cellulitis of both lower extremities, Cellulitis of left lower extremity, Cellulitis of lower extremity, CHF (congestive heart failure) ([address] Chronic kidney disease, unspecified, Coronary atherosclerosis of native coronary artery, Essential hypertension, Essential hypertension, malignant, Hallucinations, Hard of hearing, Head contusion, Hypokalemia, Iron deficiency anemia, unspecified, Meningioma ([address] Myocardial infarction ([address] Other and unspecified hyperlipidemia, Pure hypercholesterolemia, Toxic metabolic encephalopathy, Type II or unspecified type diabetes mellitus without mention of complication, not stated as uncontrolled, Unspecified asthma(493.90), Unspecified venous (peripheral) insufficiency, Unspecified vitamin D deficiency, and UTI (urinary tract infection).     PAST SURGICAL HISTORY: has a past surgical history that includes Tonsillectomy and adenoidectomy; Coronary artery bypass graft; intubation (3/7/2022); Thoracentesis (3/10/2022); Upper gastrointestinal endoscopy (N/A, 3/15/2022); and Colonoscopy (N/A, 3/15/2022). CURRENT MEDICATIONS:  has a current medication list which includes the following prescription(s): bumetanide, oxycodone, doxycycline hyclate, bumetanide, saline nasal gel, vitamin d, amiodarone, ferrous sulfate, apixaban, compressor/nebulizer, albuterol, albuterol sulfate hfa, adjust fold cane/york handle, handicap placard, allopurinol, atorvastatin, hibiclens, desloratadine, freestyle lancets, magnesium oxide, metoprolol tartrate, miconazole, therapeutic multivitamin-minerals, freestyle lite, blood pressure, and blood glucose monitor kit and supplies. ALLERGIES:  is allergic to latex, aleve [naproxen sodium], pioglitazone, claritin [loratadine], keflex [cephalexin], and lisinopril. FAMILY HISTORY: Negative for any hematological or oncological conditions. SOCIAL HISTORY:  reports that she quit smoking about 22 years ago. She has a 2.50 pack-year smoking history. She has never used smokeless tobacco. She reports previous alcohol use. She reports that she does not use drugs. REVIEW OF SYSTEMS:   General: No fever or night sweats. Weight is stable. ENT: No double or blurred vision, no tinnitus or hearing problem, no dysphagia or sore throat   Respiratory: No chest pain, no shortness of breath, no cough or hemoptysis. Cardiovascular: Denies chest pain, PND or orthopnea. No L E swelling or palpitations. Gastrointestinal: No nausea or vomiting, abdominal pain, diarrhea or constipation. Genitourinary: Denies dysuria, hematuria, frequency, urgency or incontinence. Neurological: Denies headaches, decreased LOC, no sensory or motor focal deficits. Musculoskeletal: No arthralgia no back pain or joint swelling.    Skin: There are no rashes or bleeding. + several ulcers on her lower legs, edema and cellulitis Psychiatric: No anxiety, no depression. Endocrine: No diabetes or thyroid disease. Hematologic: No bleeding, no adenopathy. PHYSICAL EXAM: Shows a well appearing 66y.o.-year-old female who is not in pain or distress. Vital Signs: Blood pressure (!) 218/65, pulse 66, temperature 96.9 °F (36.1 °C), temperature source Temporal, height 4' 11\" (1.499 m), weight 137 lb (62.1 kg). HEENT: Normocephalic and atraumatic. Pupils are equal, round, reactive to light and accommodation. Extraocular muscles are intact. Neck: Showed no JVD, no carotid bruit. Lungs: Clear to auscultation bilaterally. Heart: Regular without any murmur. Abdomen: Soft, nontender. No hepatosplenomegaly. Extremities: Lower extremities show no edema, clubbing, or cyanosis. Breasts: Examination not done today. Neuro exam: intact cranial nerves bilaterally no motor or sensory deficit, gait is normal. Lymphatic: no adenopathy appreciated in the supraclavicular, axillary, cervical or inguinal area    REVIEW OF LABORATORY DATA:   Lab Results   Component Value Date    WBC 5.8 05/18/2022    HGB 12.4 05/18/2022    HCT 38.1 05/18/2022    MCV 94.6 05/18/2022     05/18/2022       Chemistry        Component Value Date/Time     05/18/2022 1419    K 4.2 05/18/2022 1419     05/18/2022 1419    CO2 26 05/18/2022 1419    BUN 62 (H) 05/18/2022 1419    CREATININE 1.70 (H) 05/18/2022 1419        Component Value Date/Time    CALCIUM 9.1 05/18/2022 1419    ALKPHOS 152 (H) 05/18/2022 1419    AST 25 05/18/2022 1419    ALT 18 05/18/2022 1419    BILITOT 0.25 (L) 05/18/2022 1419        Lab Results   Component Value Date    IRON 20 (L) 03/01/2022    TIBC 419 03/01/2022    FERRITIN 13 03/01/2022     REVIEW OF RADIOLOGICAL RESULTS:     -- Diagnosis --   THORACENTESIS FLUID right lung:           NEGATIVE FOR MALIGNANCY. DATE OF PROCEDURE: 3/15/2022      SURGEON: Asiya Gonzales MD  Facility:  [facility]  ASSISTANT: None  Anesthesia: MAC  PREOPERATIVE DIAGNOSIS:      Anemia  Unexplained melena     Diagnosis:  Biopsies were taken from the small bowel randomly        POSTOPERATIVE DIAGNOSIS: As described below     OPERATION: Upper GI endoscopy with Biopsy     ANESTHESIA: Moderate Sedation      ESTIMATED BLOOD LOSS: Less than 50 ml     COMPLICATIONS: None. SPECIMENS:  Was Obtained: as above      HISTORY: The patient is a 66y.o. year old female with history of above preop diagnosis. I recommended esophagogastroduodenoscopy with possible biopsy and I explained the risk, benefits, expected outcome, and alternatives to the procedure. Risks included but are not limited to bleeding, infection, respiratory distress, hypotension, and perforation of the esophagus, stomach, or duodenum. Patient understands and is in agreement. The patient was counseled at length about the risks of rae Covid-19 during their perioperative period and any recovery window from their procedure. The patient was made aware that rae Covid-19  may worsen their prognosis for recovering from their procedure  and lend to a higher morbidity and/or mortality risk. All material risks, benefits, and reasonable alternatives including postponing the procedure were discussed. The patient does wish to proceed with the procedure at this time. PROCEDURE: The patient was given IV conscious sedation. The patient's SPO2 remained above 90% throughout the procedure. The gastroscope was inserted orally and advanced under direct vision through the esophagus, through the stomach, through the pylorus, and into the descending duodenum. Post sedation note : The patient's SPO2 remained above 90% throughout the procedure. the vital signs remained stable , and no immediate complication form the procedure noted, patient will be ready for d/c when criteria is met .         Findings:     Retropharyngeal area was grossly normal appearing     Esophagus: normal     Stomach:    Fundus: normal    Body: normal    Antrum: normal     Duodenum:     Descending: abnormal: Biopsies were taken from the small bowel randomly    Bulb: abnormal: Biopsies were taken from the small bowel randomly     The scope was removed and the patient tolerated the procedure well. Recommendations/Plan:   1. F/U Biopsies  2. F/U In Office in 3-4 weeks  3. Discussed with the family     Electronically signed by Jack Laureano MD  on 3/15/2022 at 1:32 PM      DATE OF PROCEDURE: 3/15/2022     SURGEON: Jack Laureano MD  Facility : [facility]  ASSISTANT: None  Anesthesia: mac   PREOPERATIVE DIAGNOSIS:   Iron deficiency anemia     POSTOPERATIVE DIAGNOSIS: as described below     OPERATION: Total colonoscopy      ANESTHESIA: Moderate Sedation     ESTIMATED BLOOD LOSS: less than 50      COMPLICATIONS: None. SPECIMENS:  Was Not Obtained     HISTORY: The patient is a 66y.o. year old female with history of above preop diagnosis. I recommended colonoscopy with possible biopsy or polypectomy and I explained the risk, benefits, expected outcome, and alternatives to the procedure. Risks included but are not limited to bleeding, infection, respiratory distress, hypotension, and perforation of the colon and possibility of missing a lesion. The patient understands and is in agreement. The patient was counseled at length about the risks of rae Covid-19 during their perioperative period and any recovery window from their procedure. The patient was made aware that rae Covid-19  may worsen their prognosis for recovering from their procedure  and lend to a higher morbidity and/or mortality risk. All material risks, benefits, and reasonable alternatives including postponing the procedure were discussed. The patient does wish to proceed with the procedure at this time. PROCEDURE: The patient was given IV conscious sedation. The patient's SPO2 remained above 90% throughout the procedure.       The colonoscope was inserted per rectum and advanced under direct vision to the cecum without difficulty. Post sedation note : The patient's SPO2 remained above 90% throughout the procedure. the vital signs remained stable , and no immediate complication form the procedure noted, patient will be ready for d/c when criteria is met . The prep was good. Findings:  Terminal ileum: normal     Cecum/Ascending colon: normal     Transverse colon: normal     Descending/Sigmoid colon: abnormal: Diverticulosis     Rectum/Anus: examined in normal and retroflexed positions and was abnormal: 1 spot of hemorrhagic looks like trauma from probably enema ? ? Withdrawal Time was (minutes): 10     The colon was decompressed and the scope was removed. The patient tolerated the procedure well. Recommendations/Plan:   1. Capsule endoscopy as an outpatient  2. H&H and iron monitoring with infusion/transfusion  3. Restart anticoagulation with close monitoring        IMPRESSION:   Iron deficiency anemia  CKD  CHF  Elderly patient    PLAN:   I had a detailed discussion with the patient and her daughter. Patient is very hard of hearing. We reviewed previous medical records. Patient had an acute drop in hemoglobin back in March when her hemoglobin went down from 10-4. Patient did have an elevated reticulocyte count also had iron depletion suggesting of acute blood loss anemia. Patient GI work-up at that time was not conclusive. Capsule endoscopy was recommended but patient is not interested. Patient has been taking iron on and off. Does complain of getting constipated with oral iron. Patient recently had to go to the ER because of shortness of breath CBC done at that time showed hemoglobin of 12.7 which has significantly improved since patient's admission back in March  Etiology of iron deficiency still not clear. We will recheck patient iron stores. Rule out any concomitant nutritional deficiencies.   Rule out paraproteinemia. Will obtain peripheral smear reticulocyte count. Given patient's advanced age and frail condition at this point we will hold off on any aggressive work-up including bone marrow biopsy  Patient is on anticoagulation using Eliquis. I believe CKD is also contributing to the anemia. We will check erythropoietin level  Patient and her daughter had multiple questions which answered to the best of my ability. We will reassess patient in the next few weeks and review results of the above test      Scribe Attestation   This note was created by Jerome Polk acting as scribe for the physician signing this note  Electronically Signed  Jerome Polk, 5/25/2022  Scribe, [employer] Attending Attestation   Note was reviewed and edited. I am in agreement with the note as entered    Andrew King MD      This note is created with the assistance of a speech recognition program.  While intending to generate a document that actually reflects the content of the visit, the document can still have some errors including those of syntax and sound a like substitutions which may escape proof reading. It such instances, actual meaning can be extrapolated by contextual diversion. Marisela Anne

## 2022-05-26 ENCOUNTER — TELEPHONE (OUTPATIENT)
Dept: FAMILY MEDICINE CLINIC | Age: 72
End: 2022-05-26

## 2022-05-26 RX ORDER — BLOOD PRESSURE TEST KIT
KIT MISCELLANEOUS
Qty: 1 KIT | Refills: 0 | Status: SHIPPED | OUTPATIENT
Start: 2022-05-26

## 2022-05-26 ASSESSMENT — ENCOUNTER SYMPTOMS: SHORTNESS OF BREATH: 1

## 2022-05-26 NOTE — TELEPHONE ENCOUNTER
Noted. Thank you!     Future Appointments   Date Time Provider Angela Reyez   6/1/2022 12:45 PM Dani Dunham DPM [facility]   6/14/2022  2:00  [facility] DIGITAL RM ST[facility]Z MAMMO [address] [facility] Radiolog   6/14/2022  2:30  [facility] DEXA RM [facility] MAMMO [address] [facility]   6/15/2022  9:30 AM Jessica Cooper MD [address] [place] [address]   8/4/2022  2:30 PM Belvin Cabot, MD fp sc [email or web address]   11/25/2022  3:30 PM Belvin Cabot, MD fp shaw [place]

## 2022-05-26 NOTE — TELEPHONE ENCOUNTER
Please check with patient's daughter, Ez Jo, what the blood pressure is today?     If still high, to contact the nephrologist and see if we can add hydralazine 10 mg 3 times a day, patient also has atrial fibrillation    I can send the prescription to the pharmacy, I just need the okay from nephrologist    Regarding the pain medication only if she has any questions, there is another telephone encounter from yesterday    Pulse Readings from Last 3 Encounters:   05/25/22 66   05/24/22 76   05/20/22 61       BP Readings from Last 3 Encounters:   05/25/22 (!) 218/65   05/24/22 (!) 186/79   05/20/22 (!) 175/60

## 2022-05-26 NOTE — TELEPHONE ENCOUNTER
Called daughter [facility], did give her detail message instructed by . Verbalized understanding, she will contact nephrologist office to get a verbal consent. If it's okay for her to take the hydralazine 10 mg 3 times a day. Will give our office a call back with answer. Also she did state mother already has a Blood pressure cuff at home. So declined script order for new bp monitor cuff.

## 2022-05-26 NOTE — TELEPHONE ENCOUNTER
2/2 I also ordered the blood pressure machine for her, where to fax it?   I will place the order in the box

## 2022-05-31 ENCOUNTER — TELEPHONE (OUTPATIENT)
Dept: INFUSION THERAPY | Age: 72
End: 2022-05-31

## 2022-05-31 NOTE — TELEPHONE ENCOUNTER
Noted  Future Appointments   Date Time Provider Angela Reyez   6/1/2022 12:45 PM Celso Suazo, DPM [facility]   6/14/2022  2:00  [facility] DIGITAL RM STCZ MAMMO [address] [facility] Radiolog   6/14/2022  2:30 PM [facility] DEXA RM [facility]Z MAMMO ST Radiolog   6/15/2022  9:30 AM Darshan Bañuelos MD [address] [place] [address]   8/4/2022  2:30 PM Rayna Porter MD [email or web address]   11/25/2022  3:30 PM Rayna Porter MD [email or web address]     BP Readings from Last 3 Encounters:   05/25/22 (!) 218/65   05/24/22 (!) 186/79   05/20/22 (!) 175/60

## 2022-05-31 NOTE — TELEPHONE ENCOUNTER
Pt's marshal called in stating pt started iron tabs, but has been constipated for about 4 days. She tried increasing her fiber, but no relief. Writer advised for pt to use MOM or Mag Citrate now to get her to have a BM. Also advised for pt to start using a stool softener twice a day. Advised to call office back if pt is still constipated after trying laxatives recommended. Pt's daughter verbalized understanding.

## 2022-06-01 ENCOUNTER — HOSPITAL ENCOUNTER (OUTPATIENT)
Age: 72
Discharge: HOME | End: 2022-06-01
Payer: MEDICARE

## 2022-06-01 ENCOUNTER — HOSPITAL ENCOUNTER (OUTPATIENT)
Dept: WOUND CARE | Age: 72
Discharge: HOME | End: 2022-06-01
Payer: MEDICARE

## 2022-06-01 VITALS
BODY MASS INDEX: 27.62 KG/M2 | HEART RATE: 60 BPM | TEMPERATURE: 97.6 F | HEIGHT: 59 IN | DIASTOLIC BLOOD PRESSURE: 57 MMHG | WEIGHT: 137 LBS | SYSTOLIC BLOOD PRESSURE: 150 MMHG | RESPIRATION RATE: 20 BRPM

## 2022-06-01 DIAGNOSIS — L97.821 NON-PRESSURE CHRONIC ULCER OF OTHER PART OF LEFT LOWER LEG LIMITED TO BREAKDOWN OF SKIN (HCC): ICD-10-CM

## 2022-06-01 DIAGNOSIS — I89.0 LYMPHEDEMA: ICD-10-CM

## 2022-06-01 DIAGNOSIS — L97.922 LEG ULCER, LEFT, WITH FAT LAYER EXPOSED (HCC): ICD-10-CM

## 2022-06-01 DIAGNOSIS — I87.2 VENOUS INSUFFICIENCY OF BOTH LOWER EXTREMITIES: ICD-10-CM

## 2022-06-01 DIAGNOSIS — L97.811 NON-PRESSURE CHRONIC ULCER OF OTHER PART OF RIGHT LOWER LEG LIMITED TO BREAKDOWN OF SKIN (HCC): ICD-10-CM

## 2022-06-01 DIAGNOSIS — R80.9 MICROALBUMINURIA: ICD-10-CM

## 2022-06-01 DIAGNOSIS — E13.22 SECONDARY DIABETES MELLITUS WITH STAGE 3 CHRONIC KIDNEY DISEASE (HCC): ICD-10-CM

## 2022-06-01 DIAGNOSIS — N18.30 BENIGN HYPERTENSION WITH CKD (CHRONIC KIDNEY DISEASE) STAGE III (HCC): ICD-10-CM

## 2022-06-01 DIAGNOSIS — R60.0 BILATERAL LOWER EXTREMITY EDEMA: ICD-10-CM

## 2022-06-01 DIAGNOSIS — I12.9 BENIGN HYPERTENSION WITH CKD (CHRONIC KIDNEY DISEASE) STAGE III (HCC): ICD-10-CM

## 2022-06-01 DIAGNOSIS — L97.912 LEG ULCER, RIGHT, WITH FAT LAYER EXPOSED (HCC): Primary | ICD-10-CM

## 2022-06-01 DIAGNOSIS — N18.32 STAGE 3B CHRONIC KIDNEY DISEASE (HCC): ICD-10-CM

## 2022-06-01 DIAGNOSIS — N18.30 SECONDARY DIABETES MELLITUS WITH STAGE 3 CHRONIC KIDNEY DISEASE (HCC): ICD-10-CM

## 2022-06-01 DIAGNOSIS — D50.9 IRON DEFICIENCY ANEMIA, UNSPECIFIED IRON DEFICIENCY ANEMIA TYPE: ICD-10-CM

## 2022-06-01 LAB
ABSOLUTE EOS #: 0 K/UL (ref 0–0.4)
ABSOLUTE LYMPH #: 1.4 K/UL (ref 1–4.8)
ABSOLUTE MONO #: 1 K/UL (ref 0.1–1.3)
ABSOLUTE RETIC #: 0.07 M/UL (ref 0.02–0.1)
ANION GAP SERPL CALCULATED.3IONS-SCNC: 15 MMOL/L (ref 9–17)
BASOPHILS # BLD: 1 % (ref 0–2)
BASOPHILS ABSOLUTE: 0.1 K/UL (ref 0–0.2)
BUN BLDV-MCNC: 101 MG/DL (ref 8–23)
CALCIUM SERPL-MCNC: 9.1 MG/DL (ref 8.6–10.4)
CHLORIDE BLD-SCNC: 102 MMOL/L (ref 98–107)
CO2: 22 MMOL/L (ref 20–31)
CREAT SERPL-MCNC: 2.07 MG/DL (ref 0.5–0.9)
EOSINOPHILS RELATIVE PERCENT: 0 % (ref 0–4)
FOLATE: >20 NG/ML
GFR AFRICAN AMERICAN: 29 ML/MIN
GFR NON-AFRICAN AMERICAN: 24 ML/MIN
GFR SERPL CREATININE-BSD FRML MDRD: ABNORMAL ML/MIN/{1.73_M2}
GLUCOSE BLD-MCNC: 118 MG/DL (ref 70–99)
HCT VFR BLD CALC: 39.6 % (ref 36–46)
HEMOGLOBIN: 12.6 G/DL (ref 12–16)
LYMPHOCYTES # BLD: 11 % (ref 24–44)
MCH RBC QN AUTO: 30.3 PG (ref 26–34)
MCHC RBC AUTO-ENTMCNC: 31.8 G/DL (ref 31–37)
MCV RBC AUTO: 95.5 FL (ref 80–100)
MONOCYTES # BLD: 8 % (ref 1–7)
PDW BLD-RTO: 17.1 % (ref 11.5–14.9)
PLATELET # BLD: 336 K/UL (ref 150–450)
PMV BLD AUTO: 7 FL (ref 6–12)
POTASSIUM SERPL-SCNC: 4.3 MMOL/L (ref 3.7–5.3)
RBC # BLD: 4.14 M/UL (ref 4–5.2)
RETIC %: 1.8 % (ref 0.5–2)
SEG NEUTROPHILS: 80 % (ref 36–66)
SEGMENTED NEUTROPHILS ABSOLUTE COUNT: 10.2 K/UL (ref 1.3–9.1)
SODIUM BLD-SCNC: 139 MMOL/L (ref 135–144)
VITAMIN B-12: 1927 PG/ML (ref 232–1245)
WBC # BLD: 12.7 K/UL (ref 3.5–11)

## 2022-06-01 PROCEDURE — 84165 PROTEIN E-PHORESIS SERUM: CPT

## 2022-06-01 PROCEDURE — 84155 ASSAY OF PROTEIN SERUM: CPT

## 2022-06-01 PROCEDURE — 36415 COLL VENOUS BLD VENIPUNCTURE: CPT

## 2022-06-01 PROCEDURE — 85025 COMPLETE CBC W/AUTO DIFF WBC: CPT

## 2022-06-01 PROCEDURE — 82607 VITAMIN B-12: CPT

## 2022-06-01 PROCEDURE — 29580 STRAPPING UNNA BOOT: CPT

## 2022-06-01 PROCEDURE — 82746 ASSAY OF FOLIC ACID SERUM: CPT

## 2022-06-01 PROCEDURE — 86334 IMMUNOFIX E-PHORESIS SERUM: CPT

## 2022-06-01 PROCEDURE — 80048 BASIC METABOLIC PNL TOTAL CA: CPT

## 2022-06-01 PROCEDURE — 82668 ASSAY OF ERYTHROPOIETIN: CPT

## 2022-06-01 PROCEDURE — 85045 AUTOMATED RETICULOCYTE COUNT: CPT

## 2022-06-01 RX ORDER — LIDOCAINE HYDROCHLORIDE 20 MG/ML
JELLY TOPICAL ONCE
Status: CANCELLED | OUTPATIENT
Start: 2022-06-01 | End: 2022-06-01

## 2022-06-01 RX ORDER — HYDRALAZINE HYDROCHLORIDE 10 MG/1
10 TABLET, FILM COATED ORAL 3 TIMES DAILY
Status: ON HOLD | COMMUNITY
End: 2022-06-09 | Stop reason: HOSPADM

## 2022-06-01 RX ORDER — LIDOCAINE HYDROCHLORIDE 40 MG/ML
SOLUTION TOPICAL ONCE
Status: CANCELLED | OUTPATIENT
Start: 2022-06-01 | End: 2022-06-01

## 2022-06-01 RX ORDER — LIDOCAINE HYDROCHLORIDE 40 MG/ML
SOLUTION TOPICAL ONCE
Status: COMPLETED | OUTPATIENT
Start: 2022-06-01 | End: 2022-06-01

## 2022-06-01 RX ADMIN — LIDOCAINE HYDROCHLORIDE 40 ML: 40 SOLUTION TOPICAL at 13:32

## 2022-06-01 ASSESSMENT — PAIN DESCRIPTION - LOCATION: LOCATION: LEG

## 2022-06-01 ASSESSMENT — PAIN DESCRIPTION - DESCRIPTORS: DESCRIPTORS: ACHING;BURNING

## 2022-06-01 ASSESSMENT — PAIN DESCRIPTION - PAIN TYPE: TYPE: CHRONIC PAIN

## 2022-06-01 ASSESSMENT — PAIN - FUNCTIONAL ASSESSMENT: PAIN_FUNCTIONAL_ASSESSMENT: ACTIVITIES ARE NOT PREVENTED

## 2022-06-01 ASSESSMENT — PAIN DESCRIPTION - ONSET: ONSET: ON-GOING

## 2022-06-01 ASSESSMENT — PAIN DESCRIPTION - FREQUENCY: FREQUENCY: CONTINUOUS

## 2022-06-01 ASSESSMENT — PAIN SCALES - GENERAL: PAINLEVEL_OUTOF10: 3

## 2022-06-01 ASSESSMENT — PAIN DESCRIPTION - ORIENTATION: ORIENTATION: RIGHT;LEFT

## 2022-06-01 NOTE — TELEPHONE ENCOUNTER
BP Readings from Last 3 Encounters:   06/01/22 (S) (!) 150/57   05/25/22 (!) 218/65   05/24/22 (!) 186/79     Future Appointments   Date Time Provider Anegla Aissatou   6/8/2022  2:00 PM KADEEM Renee WND CAR [facility]   6/14/2022  2:00 PM [facility] DIGITAL RM STCZ [facility] Radiolog   6/14/2022  2:30 PM [facility] DEXA RM [facility] Radiolog   6/15/2022  9:30 AM Bart Guevara MD [address] [place] [address]   8/4/2022  2:30 PM [facility]   11/25/2022  3:30 PM Beronica Bass MD fp shaw [place]

## 2022-06-01 NOTE — PROGRESS NOTES
[address]   Progress Note and Procedure Note      Dharmesh Baileychazar Ojeda  MEDICAL RECORD NUMBER:  [id number]  AGE: 66 y.o. GENDER: female  : 1950  EPISODE DATE:  2022    Subjective:     Chief Complaint   Patient presents with    Wound Check     bilateral lower legs         HISTORY of PRESENT ILLNESS HPI     Rukhsana Agarwal is a 66 y.o. female who presents today for wound/ulcer evaluation. Interval history:  Lupe's ulcerations have re-developed. Had not used the Farrow wrap yet. Has been on doxycycline over the week. No sign or symptom of infection. History of Wound Context: Diane Boswell presents with significant other today for follow-up of bilateral lower leg ulcerations. The large ulceration on the right posterior calf has healed. The left leg wounds have healed. She has non-invasive vascular testing scheduled but has had issues with coverage by insurance. Has done well with the Unna boot and drainage has decreased.     Ulcer Identification:  Ulcer Type: venous and lymphedema  Contributing Factors: edema, venous stasis, lymphedema and decreased mobility          PAST MEDICAL HISTORY        Diagnosis Date    Acute CVA (cerebrovascular accident) ([address] 2020    Altered mental status 2021    Arthritis     Brain mass     Cellulitis and abscess     Cellulitis and abscess of unspecified site     Cellulitis of both lower extremities 2021    Cellulitis of left lower extremity 2020    Cellulitis of lower extremity 10/4/2020    CHF (congestive heart failure) (HCC)     Chronic kidney disease, unspecified     Coronary atherosclerosis of native coronary artery     Essential hypertension 2020    Essential hypertension, malignant     Hallucinations 2021    Hard of hearing     Head contusion 2020    Hypokalemia 2020    Iron deficiency anemia, unspecified     Meningioma ([address] 2021    Myocardial infarction ([address]  Other and unspecified hyperlipidemia     Pure hypercholesterolemia     Toxic metabolic encephalopathy 3464    Type II or unspecified type diabetes mellitus without mention of complication, not stated as uncontrolled     Unspecified asthma(493.90)     Unspecified venous (peripheral) insufficiency     Unspecified vitamin D deficiency     UTI (urinary tract infection) 2021       PAST SURGICAL HISTORY    Past Surgical History:   Procedure Laterality Date    COLONOSCOPY N/A 3/15/2022    COLONOSCOPY DIAGNOSTIC performed by Anant De Jesus MD at [facility].      x 3, Dr. Aida Foster  3/7/2022         THORACENTESIS  3/10/2022         TONSILLECTOMY AND ADENOIDECTOMY      UPPER GASTROINTESTINAL ENDOSCOPY N/A 3/15/2022    EGD BIOPSY performed by Anant De Jesus MD at [address] [place] [address]    Family History   Problem Relation Age of Onset    Diabetes Mother     Heart Disease Mother     Heart Disease Father     Diabetes Sister     High Blood Pressure Sister     Diabetes Maternal Grandmother        SOCIAL HISTORY    Social History     Tobacco Use    Smoking status: Former Smoker     Packs/day: 0.25     Years: 10.00     Pack years: 2.50     Quit date: 2000     Years since quittin.4    Smokeless tobacco: Never Used   Vaping Use    Vaping Use: Never used   Substance Use Topics    Alcohol use: Not Currently     Alcohol/week: 0.0 standard drinks    Drug use: No       ALLERGIES    Allergies   Allergen Reactions    Latex Rash    Aleve [Naproxen Sodium]      Chronic kidney disease stage III, CHF    Pioglitazone Other (See Comments)     Congestive heart failure    Claritin [Loratadine]     Keflex [Cephalexin]     Lisinopril      Needs clarification of contraindication       MEDICATIONS    Current Outpatient Medications on File Prior to Encounter   Medication Sig Dispense Refill    hydrALAZINE (APRESOLINE) 10 MG tablet Take 10 mg by mouth 3 times daily      Blood Pressure KIT Diagnosis: HTN. Needs to check blood pressure 1-2 times a day until stable, then once a day. Goal blood pressure less than 135/85, and above 110/60. 1 kit 0    bumetanide (BUMEX) 1 MG tablet Take 1 tablet by mouth Daily with supper Takes 2 mg in am and 1 pm 180 tablet 3    doxycycline hyclate (VIBRA-TABS) 100 MG tablet Take 1 tablet by mouth 2 times daily for 10 days 20 tablet 0    saline nasal gel (AYR) GEL by Nasal route as needed for Congestion For nose bleeds, 2-3 times a day intranasal prn 14.1 g 3    vitamin D (ERGOCALCIFEROL) 1.25 MG (47466 UT) CAPS capsule Take 1 capsule by mouth once a week Sundays 12 capsule 0    amiodarone (CORDARONE) 200 MG tablet Take 1 tablet by mouth daily 90 tablet 0    ferrous sulfate (IRON 325) 325 (65 Fe) MG tablet Take 1 tablet by mouth daily (with breakfast) 180 tablet 1    apixaban (ELIQUIS) 2.5 MG TABS tablet Take 1 tablet by mouth 2 times daily 60 tablet 0    Nebulizers (COMPRESSOR/NEBULIZER) MISC Nebulizer with compressor. Disposable Med Nebs 2 /month. Reusable Med Nebs 1 per 6 months. Aerosol Mask 1 per month. Replacement Filters 2 per month. All other related supplies as needed per month. Medications being used: Albuterol . 083 unit dose vial. Frequency: every 6 hrs x 4/day . Length of Need: 1 1 each 3    albuterol (PROVENTIL) (2.5 MG/3ML) 0.083% nebulizer solution Take 3 mLs by nebulization every 6 hours as needed for Wheezing or Shortness of Breath 125 each 0    albuterol sulfate HFA (PROAIR HFA) 108 (90 Base) MCG/ACT inhaler Inhale 2 puffs into the lungs every 6 hours as needed for Wheezing or Shortness of Breath (cough) 8 g 3    Misc.  Devices (ADJUST FOLD CANE/YORK HANDLE) MISC Use daily for walking 1 each 0    Handicap Placard MISC by Does not apply route Expires on 12/30/25 1 each 0    allopurinol (ZYLOPRIM) 300 MG tablet Take 1 tablet by mouth daily Per rheumatologist 90 tablet 3    atorvastatin (LIPITOR) 40 MG tablet Take 1 tablet by mouth once daily 90 tablet 3    chlorhexidine (HIBICLENS) 4 % external liquid for decontamination AND WASH LEGS EVERY  mL 2    desloratadine (CLARINEX) 5 MG tablet Take 1 tablet by mouth once daily 90 tablet 3    FreeStyle Lancets MISC 1 each by Does not apply route daily Patient needs to contact office before any further refills will be approved 90 each 3    magnesium oxide (MAG-OX) 400 (241.3 Mg) MG TABS tablet Take 1 tablet by mouth twice daily 180 tablet 3    metoprolol tartrate (LOPRESSOR) 50 MG tablet Take 1 tablet by mouth 2 times daily 180 tablet 3    miconazole (MICOTIN) 2 % powder Apply topically 2 times daily UNDER THE SKIN FOLDS LONG TERM 45 g 1    Multiple Vitamins-Minerals (THERAPEUTIC MULTIVITAMIN-MINERALS) tablet Take 1 tablet by mouth daily 90 tablet 3    blood glucose test strips (FREESTYLE LITE) strip 1 each by In Vitro route daily As needed. 100 each 3    Blood Pressure KIT 1 kit by Does not apply route three times daily 1 kit 0    blood glucose monitor kit and supplies 1 kit by Other route three times daily Dispense Butterfly Elite CBG Device 1 kit 0     No current facility-administered medications on file prior to encounter. REVIEW OF SYSTEMS    Review of Systems   Constitutional: Negative for chills and fever. Skin: Positive for wound. Objective:      BP (!) 147/57   Pulse 59   Temp 97.6 °F (36.4 °C) (Tympanic)   Resp 20   Ht 4' 11\" (1.499 m)   Wt 137 lb (62.1 kg)   BMI 27.67 kg/m²     Wt Readings from Last 3 Encounters:   06/01/22 137 lb (62.1 kg)   05/25/22 137 lb (62.1 kg)   05/24/22 135 lb (61.2 kg)       Physical Exam:  General:  Alert and oriented x3. In no acute distress.      Lower Extremity Physical Exam:    Vascular: DP pulses are not palpable, Bilateral. PT pulses are not palpable, Bilateral. CFT <3 seconds to all digits, Bilateral.  Pitting edema, Bilateral.  Hair growth is absent to the level of the lower leg, Bilateral. Papillomatosis of the skin consistent with lymphedema. Neuro: Saph/sural/SP/DP/plantar sensation intact to light touch. Musculoskeletal: EHL/FHL/GS/TA gross motor intact. Gross deformity is absent. Dermatologic: Venous ulcerations bilateral lower leg at the calf level. Wound base is into the dermal layer and granular. There is no erythema. There is no purulent drainage. No increased calor skin temp is equal bilateral.  There is no fluctuance or crepitus. Interdigital maceration absent, Bilateral.     Wound 05/20/22 Right Wound #1 right lower  leg cluster- converged with #2 (Active)   Wound Image    05/24/22 1520   Wound Etiology Venous 06/01/22 1324   Dressing Status New drainage noted; Old drainage noted 06/01/22 1324   Wound Cleansed Soap and water 06/01/22 1324   Wound Length (cm) 12 cm 06/01/22 1324   Wound Width (cm) 30 cm 06/01/22 1324   Wound Depth (cm) 0.01 cm 06/01/22 1324   Wound Surface Area (cm^2) 360 cm^2 06/01/22 1324   Change in Wound Size % (l*w) -2300 06/01/22 1324   Wound Volume (cm^3) 3.6 cm^3 06/01/22 1324   Wound Healing % -140 06/01/22 1324   Post-Procedure Length (cm) 12 cm 06/01/22 1324   Post-Procedure Width (cm) 30 cm 06/01/22 1324   Post-Procedure Depth (cm) 0.1 cm 06/01/22 1324   Post-Procedure Surface Area (cm^2) 360 cm^2 06/01/22 1324   Post-Procedure Volume (cm^3) 36 cm^3 06/01/22 1324   Wound Assessment Pink/red;Erythema 06/01/22 1324   Drainage Amount Moderate 06/01/22 1324   Drainage Description Serosanguinous; Serous; Yellow 06/01/22 1324   Odor None 06/01/22 1324   Marian-wound Assessment Fragile; Non-blanchable erythema 06/01/22 1324   Margins Attached edges 06/01/22 1324   Wound Thickness Description not for Pressure Injury Full thickness 06/01/22 1324   Number of days: 12       Wound 05/20/22 Left;Proximal Wound # 4 Left lower lat leg (Active)   Wound Image   05/24/22 1520   Wound Etiology Venous 06/01/22 1324   Dressing Status New drainage noted; Old drainage noted 06/01/22 1324   Wound Cleansed Soap and water 06/01/22 1324   Wound Length (cm) 13 cm 06/01/22 1324   Wound Width (cm) 11 cm 06/01/22 1324   Wound Depth (cm) 0.1 cm 06/01/22 1324   Wound Surface Area (cm^2) 143 cm^2 06/01/22 1324   Change in Wound Size % (l*w) -866.22 06/01/22 1324   Wound Volume (cm^3) 14.3 cm^3 06/01/22 1324   Wound Healing % -866 06/01/22 1324   Post-Procedure Length (cm) 13 cm 06/01/22 1324   Post-Procedure Width (cm) 11 cm 06/01/22 1324   Post-Procedure Depth (cm) 0 cm 06/01/22 1324   Post-Procedure Surface Area (cm^2) 143 cm^2 06/01/22 1324   Post-Procedure Volume (cm^3) 0 cm^3 06/01/22 1324   Wound Assessment Erythema;Pink/red 06/01/22 1324   Drainage Amount Large 06/01/22 1324   Drainage Description Sanguinous; Serosanguinous; Serous 06/01/22 1324   Odor None 06/01/22 1324   Marian-wound Assessment Fragile; Non-blanchable erythema; Warm 06/01/22 1324   Margins Attached edges 06/01/22 1324   Wound Thickness Description not for Pressure Injury Full thickness 06/01/22 1324   Number of days: 12          Assessment:      Active Hospital Problems    Diagnosis Date Noted    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 ([address] [L97.811] 05/20/2022     Priority: Medium    Lymphedema [I89.0] 05/11/2022     Priority: Medium    Peripheral vascular disease, unspecified ([address] [I73.9] 05/11/2022     Priority: Medium    Bilateral lower extremity edema [R60.0] 04/20/2022     Priority: Medium    Non-pressure chronic ulcer of other part of left lower leg limited to breakdown of skin [facility]) Reubin Standard 04/20/2022     Priority: Medium       Plan:     Treatment Note please see attached Discharge Instructions    Discussed the importance of compression therapy for control of edema and prevention of re-ulceration once healed. Did well with Mary caraballo previously. Will begin today.     Silvercel to the wound base  Elevate the legs as much as possible at home     Educated on signs and symptoms of infection. Instructed to call clinic immediately or go to ER if signs and symptoms of infection are present. RTC 1 week        Written patient discharge instructions given to patient and signed by patient or POA.       Orders Placed This Encounter   Medications    lidocaine (XYLOCAINE) 4 % external solution     Orders Placed This Encounter   Procedures    Initiate Outpatient Wound Care Protocol     Cleanse wound with saline    If wound contains bioburden or contamination cleanse with wound cleanser or antimicrobial solution     For normal periwound tissue without irritation nor maceration, apply topical skin protectant    For periwound tissue with irritation and/or maceration, apply zinc based product, topical steroid cream/ointment, or equivalent     For wounds with dry firm black eschar and/or without exudate, apply betadine and leave open to air      For wounds with scant/small to no exudate or drainage, apply wound gel, hydrocolloid, polymer, or equivalent and cover with secondary dressing/foam      For wounds with moderate/large exudate or drainage, apply alginate, hydrofiber, polymer, or equivalent and cover with secondary dressing/foam    For wounds with nonviable tissue requiring removal, apply chemical or mechanical debrider and cover with secondary dressing/foam    For wounds with tunneling, dead space, or cavity, fill or pack with strip/gauze/kerlex to fit and cover with secondary dressing/foam    For wounds with adequate granulation or epithelization, apply wound gel, hydrocolloid, polymer, collagen, or transparent film, and cover secondary dry dressing/foam    For wounds that need additional secondary dressing to help pad or control additional drainage/exudates, add foam, absorbent pad or hydrocolloid    For wounds with suspected or known infection, apply antimicrobial mesh and/or antimicrobial alginate/hydrofiber, or antimicrobial solution moistened gauze/kerlex, or equivalent and cover with secondary dressing/foam    Compression Management needed for edema control, apply multilayer compression or tubular garment or equivalent    Offloading Management needed for pressure relief, apply offloading shoe/boot or equivalent     Standing Status:   Standing     Number of Occurrences:   1          Discharge Instructions           [address] [facility] [address] and [address] [place]  Visit  Discharge Instructions / Physician Orders  DATE:6/1/22     Home Care:NONE     SUPPLIES ORDERED THRU:  [employer]             DATE LAST SUPPLIED 5/24/22     Wound Location:  right and left legs     Cleanse with: Keep dry and intact     Dressing Orders:  Bilat legs Primary dressing  Silvercel, Then Kerramax wrap in Calamine unna                                                                           .  Frequency: Keep dry intact     Additional Orders: Increase protein to diet (meat, cheese, eggs, fish, peanut butter, nuts and beans)  Multivitamin daily     Start doxycycline 100 mg twice a day 7 days.     OFFLOADING []?? YES  TYPE:                  [x]? ? NA     Weekly wound care visits until determined otherwise.     Antibiotic therapy-wound care related YES []?? NO []?? NA[x]? ?     MY CHART []? ?     Smart Device  []? ?      HYPERBARIC TREATMENT-                TREATMENT #                          Your next appointment with the [address] [place] [address] is in 1 week                                                                                                     (Please note your next appointment above and if you are unable to keep, kindly give a 24 hour notice.  Thank you.)  If more than 15 min late we cannot guarantee you will be seen due to clinician schedule  Per Policy, Excessive cancellation will call for dismissal from program.  If you experience any of the following, please call the [address] [place] [address] during business hours:  [phone]     * Increase in Pain  * Temperature over 101  * Increase in drainage from your wound  * Drainage with a foul odor  * Bleeding  * Increase in swelling  * Need for compression bandage changes due to slippage, breakthrough drainage.     If you need medical attention outside of the business hours of the [address] please contact your PCP or go to the nearest emergency room.     The information contained in the After Visit Summary has been reviewed with me, the patient and/or responsible adult, by my health care provider(s). I had the opportunity to ask questions regarding this information. I have elected to receive;      []? ? After Visit Summary  [x]? ? Comprehensive Discharge Instruction        Patient signature______________________________________Date:________    Electronically signed by Anshul Ulloa RN on 6/1/2022 at 1:03 PM  Electronically signed by Pola Mccarthy DPM on 6/1/2022 at 12:52 PM          Electronically signed by Pola Mccarthy DPM on 6/1/2022 at 1:45 PM

## 2022-06-01 NOTE — PLAN OF CARE
Problem: Chronic Conditions and Co-morbidities  Goal: Patient's chronic conditions and co-morbidity symptoms are monitored and maintained or improved  Outcome: Progressing     Problem: Chronic Conditions and Co-morbidities  Goal: Patient's chronic conditions and co-morbidity symptoms are monitored and maintained or improved  Outcome: Progressing     Problem: Discharge Planning  Goal: Discharge to home or other facility with appropriate resources  Outcome: Progressing     Problem: Discharge Planning  Goal: Discharge to home or other facility with appropriate resources  Outcome: Progressing     Problem: Wound:  Goal: Will show signs of wound healing; wound closure and no evidence of infection  Description: Will show signs of wound healing; wound closure and no evidence of infection  Outcome: Progressing

## 2022-06-02 LAB
ALBUMIN (CALCULATED): 2.5 G/DL (ref 3.2–5.2)
ALBUMIN PERCENT: 48 % (ref 45–65)
ALPHA 1 PERCENT: 6 % (ref 3–6)
ALPHA 2 PERCENT: 23 % (ref 6–13)
ALPHA-1-GLOBULIN: 0.3 G/DL (ref 0.1–0.4)
ALPHA-2-GLOBULIN: 1.2 G/DL (ref 0.5–0.9)
BETA GLOBULIN: 0.7 G/DL (ref 0.5–1.1)
BETA PERCENT: 13 % (ref 11–19)
GAMMA GLOBULIN %: 10 % (ref 9–20)
GAMMA GLOBULIN: 0.5 G/DL (ref 0.5–1.5)
PATHOLOGIST: ABNORMAL
PATHOLOGIST: NORMAL
PROTEIN ELECTROPHORESIS, SERUM: ABNORMAL
SERUM IFX INTERP: NORMAL
SURGICAL PATHOLOGY REPORT: NORMAL
TOTAL PROT. SUM,%: 100 % (ref 98–102)
TOTAL PROT. SUM: 5.2 G/DL (ref 6.3–8.2)
TOTAL PROTEIN: 5.2 G/DL (ref 6.4–8.3)

## 2022-06-03 LAB
ERYTHROPOIETIN: 11 MU/ML (ref 4–27)
PATHOLOGIST REVIEW: NORMAL

## 2022-06-06 ENCOUNTER — TELEPHONE (OUTPATIENT)
Dept: FAMILY MEDICINE CLINIC | Age: 72
End: 2022-06-06

## 2022-06-06 ENCOUNTER — HOSPITAL ENCOUNTER (OUTPATIENT)
Age: 72
Discharge: HOME | End: 2022-06-06
Payer: MEDICARE

## 2022-06-06 DIAGNOSIS — N18.32 STAGE 3B CHRONIC KIDNEY DISEASE (HCC): ICD-10-CM

## 2022-06-06 DIAGNOSIS — R80.9 MICROALBUMINURIA: ICD-10-CM

## 2022-06-06 DIAGNOSIS — R31.29 OTHER MICROSCOPIC HEMATURIA: Primary | ICD-10-CM

## 2022-06-06 DIAGNOSIS — N39.41 URGE INCONTINENCE OF URINE: Primary | ICD-10-CM

## 2022-06-06 DIAGNOSIS — N39.41 URGE INCONTINENCE OF URINE: ICD-10-CM

## 2022-06-06 DIAGNOSIS — I12.9 BENIGN HYPERTENSION WITH CKD (CHRONIC KIDNEY DISEASE) STAGE III (HCC): ICD-10-CM

## 2022-06-06 DIAGNOSIS — N18.30 SECONDARY DIABETES MELLITUS WITH STAGE 3 CHRONIC KIDNEY DISEASE (HCC): ICD-10-CM

## 2022-06-06 DIAGNOSIS — N18.30 BENIGN HYPERTENSION WITH CKD (CHRONIC KIDNEY DISEASE) STAGE III (HCC): ICD-10-CM

## 2022-06-06 DIAGNOSIS — E13.22 SECONDARY DIABETES MELLITUS WITH STAGE 3 CHRONIC KIDNEY DISEASE (HCC): ICD-10-CM

## 2022-06-06 DIAGNOSIS — N20.0 KIDNEY STONE ON RIGHT SIDE: ICD-10-CM

## 2022-06-06 LAB
ANION GAP SERPL CALCULATED.3IONS-SCNC: 11 MMOL/L (ref 9–17)
BACTERIA: NORMAL
BILIRUBIN URINE: NEGATIVE
BUN BLDV-MCNC: 100 MG/DL (ref 8–23)
CALCIUM SERPL-MCNC: 8.7 MG/DL (ref 8.6–10.4)
CASTS UA: NORMAL /LPF
CHLORIDE BLD-SCNC: 105 MMOL/L (ref 98–107)
CO2: 25 MMOL/L (ref 20–31)
COLOR: YELLOW
CREAT SERPL-MCNC: 2.25 MG/DL (ref 0.5–0.9)
EPITHELIAL CELLS UA: NORMAL /HPF
GFR AFRICAN AMERICAN: 26 ML/MIN
GFR NON-AFRICAN AMERICAN: 21 ML/MIN
GFR SERPL CREATININE-BSD FRML MDRD: ABNORMAL ML/MIN/{1.73_M2}
GLUCOSE BLD-MCNC: 108 MG/DL (ref 70–99)
GLUCOSE URINE: ABNORMAL
KETONES, URINE: NEGATIVE
LEUKOCYTE ESTERASE, URINE: NEGATIVE
NITRITE, URINE: NEGATIVE
PH UA: 6 (ref 5–8)
POTASSIUM SERPL-SCNC: 4.9 MMOL/L (ref 3.7–5.3)
PROTEIN UA: ABNORMAL
RBC UA: NORMAL /HPF
SODIUM BLD-SCNC: 141 MMOL/L (ref 135–144)
SPECIFIC GRAVITY UA: 1.02 (ref 1–1.03)
TURBIDITY: CLEAR
URINE HGB: ABNORMAL
UROBILINOGEN, URINE: NORMAL
WBC UA: NORMAL /HPF

## 2022-06-06 PROCEDURE — 36415 COLL VENOUS BLD VENIPUNCTURE: CPT

## 2022-06-06 PROCEDURE — 80048 BASIC METABOLIC PNL TOTAL CA: CPT

## 2022-06-06 PROCEDURE — 81001 URINALYSIS AUTO W/SCOPE: CPT

## 2022-06-06 NOTE — TELEPHONE ENCOUNTER
Order placed  To do at the lab   Diagnosis Orders   1.  Urge incontinence of urine  Urinalysis with Reflex to Culture        Future Appointments   Date Time Provider Angela Aissatou   6/8/2022  2:00 PM Becky Armas [facility]   6/14/2022  2:00 PM [facility]   6/14/2022  2:30 PM [facility] DEXA RM [facility] Radiolog   6/15/2022  9:30 AM Bill Garnett MD [address] [place] [address]   8/4/2022  2:30 PM Baron Birch MD fp [facility]   11/25/2022  3:30 PM Baron Birch MD fp [facility]

## 2022-06-06 NOTE — RESULT ENCOUNTER NOTE
Please notify patient: Patient has a lot of blood in the urine, and not real signs of UTI. I will make a referral to urologist, please give her contact information for urologist, was seen by Sade Molina before    On the CAT scan from 2021 did show she had a big kidney stone, not obstructive   There is a nonobstructing calculus measuring up  to 1.5 cm in the right kidney.  No ureteral calculus.         Future Appointments  6/8/2022   2:00 PM    Nancy Ruffin DPM        ST [facility]        [facility]  6/14/2022  2:00 PM    [facility] DIGITAL RM             [facility]  6/14/2022  2:30 PM    [facility] DEXA [facility] Radiolog  6/15/2022  9:30 AM    Molina Cabral MD          [address]  8/4/2022   2:30 PM    MD mary Pinon sc               [id number]  11/25/2022 3:30 PM    Catherine Douglas MD     [facility]               Bhavana Swanson

## 2022-06-06 NOTE — TELEPHONE ENCOUNTER
Patient daughter called stating that she is having some urine incontinence and would like a urine for the lab ordered to do today

## 2022-06-07 ENCOUNTER — APPOINTMENT (OUTPATIENT)
Dept: GENERAL RADIOLOGY | Age: 72
End: 2022-06-07
Payer: OTHER GOVERNMENT

## 2022-06-07 ENCOUNTER — HOSPITAL ENCOUNTER (OUTPATIENT)
Age: 72
Setting detail: OBSERVATION
Discharge: HOME | End: 2022-06-11
Attending: EMERGENCY MEDICINE | Admitting: INTERNAL MEDICINE
Payer: OTHER GOVERNMENT

## 2022-06-07 DIAGNOSIS — I16.0 HYPERTENSIVE URGENCY: ICD-10-CM

## 2022-06-07 DIAGNOSIS — N18.9 ACUTE KIDNEY INJURY SUPERIMPOSED ON CHRONIC KIDNEY DISEASE (HCC): ICD-10-CM

## 2022-06-07 DIAGNOSIS — N17.9 ACUTE KIDNEY INJURY SUPERIMPOSED ON CHRONIC KIDNEY DISEASE (HCC): ICD-10-CM

## 2022-06-07 DIAGNOSIS — N17.9 AKI (ACUTE KIDNEY INJURY) (HCC): Primary | ICD-10-CM

## 2022-06-07 LAB
ABSOLUTE EOS #: 0.1 K/UL (ref 0–0.4)
ABSOLUTE LYMPH #: 1.6 K/UL (ref 1–4.8)
ABSOLUTE MONO #: 0.6 K/UL (ref 0.1–1.3)
ANION GAP SERPL CALCULATED.3IONS-SCNC: 10 MMOL/L (ref 9–17)
BASOPHILS # BLD: 0 % (ref 0–2)
BASOPHILS ABSOLUTE: 0 K/UL (ref 0–0.2)
BUN BLDV-MCNC: 99 MG/DL (ref 8–23)
CALCIUM SERPL-MCNC: 8.9 MG/DL (ref 8.6–10.4)
CHLORIDE BLD-SCNC: 104 MMOL/L (ref 98–107)
CO2: 24 MMOL/L (ref 20–31)
CREAT SERPL-MCNC: 1.89 MG/DL (ref 0.5–0.9)
CREATININE URINE: 65.8 MG/DL (ref 28–217)
EOSINOPHILS RELATIVE PERCENT: 1 % (ref 0–4)
GFR AFRICAN AMERICAN: 32 ML/MIN
GFR NON-AFRICAN AMERICAN: 26 ML/MIN
GFR SERPL CREATININE-BSD FRML MDRD: ABNORMAL ML/MIN/{1.73_M2}
GLUCOSE BLD-MCNC: 124 MG/DL (ref 70–99)
HCT VFR BLD CALC: 41.4 % (ref 36–46)
HEMOGLOBIN: 13.3 G/DL (ref 12–16)
LYMPHOCYTES # BLD: 16 % (ref 24–44)
MAGNESIUM: 2.4 MG/DL (ref 1.6–2.6)
MCH RBC QN AUTO: 30.1 PG (ref 26–34)
MCHC RBC AUTO-ENTMCNC: 32.1 G/DL (ref 31–37)
MCV RBC AUTO: 93.7 FL (ref 80–100)
MONOCYTES # BLD: 6 % (ref 1–7)
PDW BLD-RTO: 16.9 % (ref 11.5–14.9)
PLATELET # BLD: 294 K/UL (ref 150–450)
PMV BLD AUTO: 7.5 FL (ref 6–12)
POTASSIUM SERPL-SCNC: 5 MMOL/L (ref 3.7–5.3)
RBC # BLD: 4.41 M/UL (ref 4–5.2)
SEG NEUTROPHILS: 77 % (ref 36–66)
SEGMENTED NEUTROPHILS ABSOLUTE COUNT: 7.5 K/UL (ref 1.3–9.1)
SODIUM BLD-SCNC: 138 MMOL/L (ref 135–144)
SODIUM,UR: <20 MMOL/L
UREA NITROGEN, UR: 902 MG/DL
WBC # BLD: 9.8 K/UL (ref 3.5–11)

## 2022-06-07 PROCEDURE — 84300 ASSAY OF URINE SODIUM: CPT

## 2022-06-07 PROCEDURE — 99285 EMERGENCY DEPT VISIT HI MDM: CPT

## 2022-06-07 PROCEDURE — 82570 ASSAY OF URINE CREATININE: CPT

## 2022-06-07 PROCEDURE — 71046 X-RAY EXAM CHEST 2 VIEWS: CPT

## 2022-06-07 PROCEDURE — 36415 COLL VENOUS BLD VENIPUNCTURE: CPT

## 2022-06-07 PROCEDURE — 80048 BASIC METABOLIC PNL TOTAL CA: CPT

## 2022-06-07 PROCEDURE — 84540 ASSAY OF URINE/UREA-N: CPT

## 2022-06-07 PROCEDURE — 85025 COMPLETE CBC W/AUTO DIFF WBC: CPT

## 2022-06-07 PROCEDURE — 83735 ASSAY OF MAGNESIUM: CPT

## 2022-06-07 RX ORDER — SODIUM CHLORIDE 9 MG/ML
INJECTION, SOLUTION INTRAVENOUS CONTINUOUS
Status: DISCONTINUED | OUTPATIENT
Start: 2022-06-07 | End: 2022-06-08

## 2022-06-08 ENCOUNTER — HOSPITAL ENCOUNTER (OUTPATIENT)
Dept: WOUND CARE | Age: 72
Discharge: HOME | End: 2022-06-08

## 2022-06-08 PROBLEM — N17.9 ACUTE KIDNEY INJURY SUPERIMPOSED ON CHRONIC KIDNEY DISEASE (HCC): Status: ACTIVE | Noted: 2022-06-08

## 2022-06-08 PROBLEM — I16.1 HYPERTENSIVE EMERGENCY: Status: ACTIVE | Noted: 2020-07-25

## 2022-06-08 PROBLEM — I10 HYPERTENSION: Status: ACTIVE | Noted: 2020-07-25

## 2022-06-08 PROBLEM — Z79.01 ANTICOAGULATED: Status: ACTIVE | Noted: 2022-06-08

## 2022-06-08 PROBLEM — N18.9 ACUTE KIDNEY INJURY SUPERIMPOSED ON CHRONIC KIDNEY DISEASE (HCC): Status: ACTIVE | Noted: 2022-06-08

## 2022-06-08 LAB
ABSOLUTE EOS #: 0.1 K/UL (ref 0–0.4)
ABSOLUTE LYMPH #: 1.2 K/UL (ref 1–4.8)
ABSOLUTE MONO #: 0.6 K/UL (ref 0.1–1.3)
ANION GAP SERPL CALCULATED.3IONS-SCNC: 12 MMOL/L (ref 9–17)
BACTERIA: ABNORMAL
BASOPHILS # BLD: 1 % (ref 0–2)
BASOPHILS ABSOLUTE: 0 K/UL (ref 0–0.2)
BILIRUBIN URINE: NEGATIVE
BUN BLDV-MCNC: 83 MG/DL (ref 8–23)
CALCIUM SERPL-MCNC: 8.4 MG/DL (ref 8.6–10.4)
CASTS UA: ABNORMAL /LPF
CHLORIDE BLD-SCNC: 106 MMOL/L (ref 98–107)
CO2: 23 MMOL/L (ref 20–31)
COLOR: YELLOW
CREAT SERPL-MCNC: 1.92 MG/DL (ref 0.5–0.9)
CREATININE URINE: 63 MG/DL (ref 28–217)
EOSINOPHILS RELATIVE PERCENT: 2 % (ref 0–4)
EPITHELIAL CELLS UA: ABNORMAL /HPF
GFR AFRICAN AMERICAN: 31 ML/MIN
GFR NON-AFRICAN AMERICAN: 26 ML/MIN
GFR SERPL CREATININE-BSD FRML MDRD: ABNORMAL ML/MIN/{1.73_M2}
GLUCOSE BLD-MCNC: 113 MG/DL (ref 70–99)
GLUCOSE URINE: ABNORMAL
HCT VFR BLD CALC: 39.8 % (ref 36–46)
HEMOGLOBIN: 12.8 G/DL (ref 12–16)
KETONES, URINE: NEGATIVE
LEUKOCYTE ESTERASE, URINE: ABNORMAL
LYMPHOCYTES # BLD: 15 % (ref 24–44)
MCH RBC QN AUTO: 30.2 PG (ref 26–34)
MCHC RBC AUTO-ENTMCNC: 32.3 G/DL (ref 31–37)
MCV RBC AUTO: 93.5 FL (ref 80–100)
MONOCYTES # BLD: 8 % (ref 1–7)
NITRITE, URINE: POSITIVE
PDW BLD-RTO: 16.5 % (ref 11.5–14.9)
PH UA: 7.5 (ref 5–8)
PLATELET # BLD: 269 K/UL (ref 150–450)
PMV BLD AUTO: 7.5 FL (ref 6–12)
POTASSIUM SERPL-SCNC: 4.2 MMOL/L (ref 3.7–5.3)
PRO-BNP: 9061 PG/ML
PROTEIN UA: ABNORMAL
RBC # BLD: 4.25 M/UL (ref 4–5.2)
RBC UA: ABNORMAL /HPF
SEG NEUTROPHILS: 74 % (ref 36–66)
SEGMENTED NEUTROPHILS ABSOLUTE COUNT: 6.4 K/UL (ref 1.3–9.1)
SODIUM BLD-SCNC: 141 MMOL/L (ref 135–144)
SODIUM,UR: <20 MMOL/L
SPECIFIC GRAVITY UA: 1.02 (ref 1–1.03)
TOTAL PROTEIN, URINE: 1010 MG/DL
TOTAL PROTEIN, URINE: 1010 MG/DL
TURBIDITY: CLEAR
URINE HGB: ABNORMAL
URINE TOTAL PROTEIN CREATININE RATIO: 16.03 (ref 0–0.2)
UROBILINOGEN, URINE: NORMAL
WBC # BLD: 8.4 K/UL (ref 3.5–11)
WBC UA: ABNORMAL /HPF

## 2022-06-08 PROCEDURE — 96376 TX/PRO/DX INJ SAME DRUG ADON: CPT

## 2022-06-08 PROCEDURE — G0378 HOSPITAL OBSERVATION PER HR: HCPCS

## 2022-06-08 PROCEDURE — 6370000000 HC RX 637 (ALT 250 FOR IP): Performed by: NURSE PRACTITIONER

## 2022-06-08 PROCEDURE — 2500000003 HC RX 250 WO HCPCS: Performed by: NURSE PRACTITIONER

## 2022-06-08 PROCEDURE — 96374 THER/PROPH/DIAG INJ IV PUSH: CPT

## 2022-06-08 PROCEDURE — 83880 ASSAY OF NATRIURETIC PEPTIDE: CPT

## 2022-06-08 PROCEDURE — 6370000000 HC RX 637 (ALT 250 FOR IP): Performed by: STUDENT IN AN ORGANIZED HEALTH CARE EDUCATION/TRAINING PROGRAM

## 2022-06-08 PROCEDURE — 99223 1ST HOSP IP/OBS HIGH 75: CPT | Performed by: INTERNAL MEDICINE

## 2022-06-08 PROCEDURE — 99213 OFFICE O/P EST LOW 20 MIN: CPT

## 2022-06-08 PROCEDURE — 81001 URINALYSIS AUTO W/SCOPE: CPT

## 2022-06-08 PROCEDURE — 80048 BASIC METABOLIC PNL TOTAL CA: CPT

## 2022-06-08 PROCEDURE — 6370000000 HC RX 637 (ALT 250 FOR IP): Performed by: INTERNAL MEDICINE

## 2022-06-08 PROCEDURE — 2580000003 HC RX 258: Performed by: INTERNAL MEDICINE

## 2022-06-08 PROCEDURE — 85025 COMPLETE CBC W/AUTO DIFF WBC: CPT

## 2022-06-08 PROCEDURE — 84300 ASSAY OF URINE SODIUM: CPT

## 2022-06-08 PROCEDURE — 36415 COLL VENOUS BLD VENIPUNCTURE: CPT

## 2022-06-08 PROCEDURE — 84156 ASSAY OF PROTEIN URINE: CPT

## 2022-06-08 PROCEDURE — 2580000003 HC RX 258: Performed by: STUDENT IN AN ORGANIZED HEALTH CARE EDUCATION/TRAINING PROGRAM

## 2022-06-08 PROCEDURE — 1200000000 HC SEMI PRIVATE

## 2022-06-08 PROCEDURE — 82570 ASSAY OF URINE CREATININE: CPT

## 2022-06-08 PROCEDURE — 6360000002 HC RX W HCPCS: Performed by: STUDENT IN AN ORGANIZED HEALTH CARE EDUCATION/TRAINING PROGRAM

## 2022-06-08 RX ORDER — SODIUM CHLORIDE 9 MG/ML
INJECTION, SOLUTION INTRAVENOUS CONTINUOUS
Status: DISCONTINUED | OUTPATIENT
Start: 2022-06-08 | End: 2022-06-10

## 2022-06-08 RX ORDER — LABETALOL 20 MG/4 ML (5 MG/ML) INTRAVENOUS SYRINGE
10 EVERY 4 HOURS PRN
Status: DISCONTINUED | OUTPATIENT
Start: 2022-06-08 | End: 2022-06-08

## 2022-06-08 RX ORDER — HYDRALAZINE HYDROCHLORIDE 25 MG/1
25 TABLET, FILM COATED ORAL 3 TIMES DAILY
Status: DISCONTINUED | OUTPATIENT
Start: 2022-06-08 | End: 2022-06-08

## 2022-06-08 RX ORDER — FERROUS SULFATE 325(65) MG
325 TABLET ORAL
Status: DISCONTINUED | OUTPATIENT
Start: 2022-06-08 | End: 2022-06-11 | Stop reason: HOSPADM

## 2022-06-08 RX ORDER — ONDANSETRON 4 MG/1
4 TABLET, ORALLY DISINTEGRATING ORAL EVERY 8 HOURS PRN
Status: DISCONTINUED | OUTPATIENT
Start: 2022-06-08 | End: 2022-06-11 | Stop reason: HOSPADM

## 2022-06-08 RX ORDER — M-VIT,TX,IRON,MINS/CALC/FOLIC 27MG-0.4MG
1 TABLET ORAL DAILY
Status: DISCONTINUED | OUTPATIENT
Start: 2022-06-08 | End: 2022-06-11 | Stop reason: HOSPADM

## 2022-06-08 RX ORDER — SODIUM CHLORIDE/ALOE VERA
GEL (GRAM) NASAL PRN
Status: DISCONTINUED | OUTPATIENT
Start: 2022-06-08 | End: 2022-06-11 | Stop reason: HOSPADM

## 2022-06-08 RX ORDER — ATORVASTATIN CALCIUM 40 MG/1
40 TABLET, FILM COATED ORAL DAILY
Status: DISCONTINUED | OUTPATIENT
Start: 2022-06-08 | End: 2022-06-11 | Stop reason: HOSPADM

## 2022-06-08 RX ORDER — ALBUTEROL SULFATE 90 UG/1
2 AEROSOL, METERED RESPIRATORY (INHALATION) EVERY 6 HOURS PRN
Status: DISCONTINUED | OUTPATIENT
Start: 2022-06-08 | End: 2022-06-11 | Stop reason: HOSPADM

## 2022-06-08 RX ORDER — HYDRALAZINE HYDROCHLORIDE 10 MG/1
10 TABLET, FILM COATED ORAL 3 TIMES DAILY
Status: DISCONTINUED | OUTPATIENT
Start: 2022-06-08 | End: 2022-06-08

## 2022-06-08 RX ORDER — ONDANSETRON 2 MG/ML
4 INJECTION INTRAMUSCULAR; INTRAVENOUS EVERY 6 HOURS PRN
Status: DISCONTINUED | OUTPATIENT
Start: 2022-06-08 | End: 2022-06-11 | Stop reason: HOSPADM

## 2022-06-08 RX ORDER — AMIODARONE HYDROCHLORIDE 200 MG/1
200 TABLET ORAL DAILY
Status: DISCONTINUED | OUTPATIENT
Start: 2022-06-08 | End: 2022-06-11 | Stop reason: HOSPADM

## 2022-06-08 RX ORDER — ALLOPURINOL 300 MG/1
300 TABLET ORAL DAILY
Status: DISCONTINUED | OUTPATIENT
Start: 2022-06-08 | End: 2022-06-11 | Stop reason: HOSPADM

## 2022-06-08 RX ORDER — ALBUTEROL SULFATE 2.5 MG/3ML
2.5 SOLUTION RESPIRATORY (INHALATION) EVERY 6 HOURS PRN
Status: DISCONTINUED | OUTPATIENT
Start: 2022-06-08 | End: 2022-06-11 | Stop reason: HOSPADM

## 2022-06-08 RX ORDER — AMLODIPINE BESYLATE 5 MG/1
5 TABLET ORAL NIGHTLY
Status: DISCONTINUED | OUTPATIENT
Start: 2022-06-08 | End: 2022-06-10

## 2022-06-08 RX ORDER — HYDRALAZINE HYDROCHLORIDE 20 MG/ML
20 INJECTION INTRAMUSCULAR; INTRAVENOUS EVERY 6 HOURS PRN
Status: DISCONTINUED | OUTPATIENT
Start: 2022-06-08 | End: 2022-06-11 | Stop reason: HOSPADM

## 2022-06-08 RX ORDER — ACETAMINOPHEN 650 MG/1
650 SUPPOSITORY RECTAL EVERY 6 HOURS PRN
Status: DISCONTINUED | OUTPATIENT
Start: 2022-06-08 | End: 2022-06-11 | Stop reason: HOSPADM

## 2022-06-08 RX ORDER — CETIRIZINE HYDROCHLORIDE 5 MG/1
5 TABLET ORAL DAILY
Status: DISCONTINUED | OUTPATIENT
Start: 2022-06-08 | End: 2022-06-11 | Stop reason: HOSPADM

## 2022-06-08 RX ORDER — HYDRALAZINE HYDROCHLORIDE 50 MG/1
50 TABLET, FILM COATED ORAL 3 TIMES DAILY
Status: DISCONTINUED | OUTPATIENT
Start: 2022-06-08 | End: 2022-06-10

## 2022-06-08 RX ORDER — LABETALOL 20 MG/4 ML (5 MG/ML) INTRAVENOUS SYRINGE
10 ONCE
Status: DISCONTINUED | OUTPATIENT
Start: 2022-06-08 | End: 2022-06-08

## 2022-06-08 RX ORDER — METOPROLOL TARTRATE 50 MG/1
50 TABLET, FILM COATED ORAL 2 TIMES DAILY
Status: DISCONTINUED | OUTPATIENT
Start: 2022-06-08 | End: 2022-06-11 | Stop reason: HOSPADM

## 2022-06-08 RX ORDER — LANOLIN ALCOHOL/MO/W.PET/CERES
400 CREAM (GRAM) TOPICAL 2 TIMES DAILY
Status: DISCONTINUED | OUTPATIENT
Start: 2022-06-08 | End: 2022-06-11 | Stop reason: HOSPADM

## 2022-06-08 RX ORDER — SODIUM CHLORIDE 0.9 % (FLUSH) 0.9 %
5-40 SYRINGE (ML) INJECTION PRN
Status: DISCONTINUED | OUTPATIENT
Start: 2022-06-08 | End: 2022-06-11 | Stop reason: HOSPADM

## 2022-06-08 RX ORDER — SODIUM CHLORIDE 9 MG/ML
INJECTION, SOLUTION INTRAVENOUS PRN
Status: DISCONTINUED | OUTPATIENT
Start: 2022-06-08 | End: 2022-06-11 | Stop reason: HOSPADM

## 2022-06-08 RX ORDER — ACETAMINOPHEN 325 MG/1
650 TABLET ORAL EVERY 6 HOURS PRN
Status: DISCONTINUED | OUTPATIENT
Start: 2022-06-08 | End: 2022-06-11 | Stop reason: HOSPADM

## 2022-06-08 RX ORDER — SODIUM CHLORIDE 0.9 % (FLUSH) 0.9 %
5-40 SYRINGE (ML) INJECTION EVERY 12 HOURS SCHEDULED
Status: DISCONTINUED | OUTPATIENT
Start: 2022-06-08 | End: 2022-06-11 | Stop reason: HOSPADM

## 2022-06-08 RX ORDER — HEPARIN SODIUM 5000 [USP'U]/ML
5000 INJECTION, SOLUTION INTRAVENOUS; SUBCUTANEOUS 3 TIMES DAILY
Status: DISCONTINUED | OUTPATIENT
Start: 2022-06-08 | End: 2022-06-08 | Stop reason: ALTCHOICE

## 2022-06-08 RX ADMIN — Medication 400 MG: at 21:36

## 2022-06-08 RX ADMIN — ANTI-FUNGAL POWDER MICONAZOLE NITRATE TALC FREE: 1.42 POWDER TOPICAL at 21:45

## 2022-06-08 RX ADMIN — ACETAMINOPHEN 650 MG: 325 TABLET ORAL at 14:51

## 2022-06-08 RX ADMIN — Medication 400 MG: at 08:44

## 2022-06-08 RX ADMIN — APIXABAN 2.5 MG: 2.5 TABLET, FILM COATED ORAL at 08:44

## 2022-06-08 RX ADMIN — CETIRIZINE HYDROCHLORIDE 5 MG: 5 TABLET, FILM COATED ORAL at 08:44

## 2022-06-08 RX ADMIN — AMLODIPINE BESYLATE 5 MG: 5 TABLET ORAL at 21:36

## 2022-06-08 RX ADMIN — METOPROLOL TARTRATE 50 MG: 50 TABLET, FILM COATED ORAL at 21:36

## 2022-06-08 RX ADMIN — SODIUM CHLORIDE: 9 INJECTION, SOLUTION INTRAVENOUS at 17:06

## 2022-06-08 RX ADMIN — FERROUS SULFATE TAB 325 MG (65 MG ELEMENTAL FE) 325 MG: 325 (65 FE) TAB at 08:44

## 2022-06-08 RX ADMIN — HYDRALAZINE HYDROCHLORIDE 20 MG: 20 INJECTION INTRAMUSCULAR; INTRAVENOUS at 22:50

## 2022-06-08 RX ADMIN — APIXABAN 2.5 MG: 2.5 TABLET, FILM COATED ORAL at 01:41

## 2022-06-08 RX ADMIN — APIXABAN 2.5 MG: 2.5 TABLET, FILM COATED ORAL at 21:36

## 2022-06-08 RX ADMIN — HYDRALAZINE HYDROCHLORIDE 25 MG: 25 TABLET, FILM COATED ORAL at 14:51

## 2022-06-08 RX ADMIN — MULTIPLE VITAMINS W/ MINERALS TAB 1 TABLET: TAB at 08:44

## 2022-06-08 RX ADMIN — AMIODARONE HYDROCHLORIDE 200 MG: 200 TABLET ORAL at 08:44

## 2022-06-08 RX ADMIN — HYDRALAZINE HYDROCHLORIDE 50 MG: 50 TABLET, FILM COATED ORAL at 21:36

## 2022-06-08 RX ADMIN — SODIUM CHLORIDE: 9 INJECTION, SOLUTION INTRAVENOUS at 01:05

## 2022-06-08 RX ADMIN — Medication 400 MG: at 01:41

## 2022-06-08 RX ADMIN — HYDRALAZINE HYDROCHLORIDE 10 MG: 10 TABLET, FILM COATED ORAL at 09:24

## 2022-06-08 RX ADMIN — METOPROLOL TARTRATE 50 MG: 50 TABLET, FILM COATED ORAL at 01:41

## 2022-06-08 RX ADMIN — ALLOPURINOL 300 MG: 300 TABLET ORAL at 08:44

## 2022-06-08 RX ADMIN — ATORVASTATIN CALCIUM 40 MG: 40 TABLET, FILM COATED ORAL at 08:44

## 2022-06-08 RX ADMIN — ANTI-FUNGAL POWDER MICONAZOLE NITRATE TALC FREE: 1.42 POWDER TOPICAL at 08:46

## 2022-06-08 RX ADMIN — HYDRALAZINE HYDROCHLORIDE 20 MG: 20 INJECTION INTRAMUSCULAR; INTRAVENOUS at 12:31

## 2022-06-08 RX ADMIN — METOPROLOL TARTRATE 50 MG: 50 TABLET, FILM COATED ORAL at 08:44

## 2022-06-08 ASSESSMENT — PAIN DESCRIPTION - DESCRIPTORS: DESCRIPTORS: DULL

## 2022-06-08 ASSESSMENT — ENCOUNTER SYMPTOMS
DIARRHEA: 0
NAUSEA: 0
SHORTNESS OF BREATH: 0
VOMITING: 0
CONSTIPATION: 0
BLOOD IN STOOL: 0
WHEEZING: 0
COUGH: 0
SORE THROAT: 0
BACK PAIN: 0
ABDOMINAL PAIN: 0

## 2022-06-08 ASSESSMENT — PAIN SCALES - GENERAL
PAINLEVEL_OUTOF10: 0
PAINLEVEL_OUTOF10: 0
PAINLEVEL_OUTOF10: 4
PAINLEVEL_OUTOF10: 0

## 2022-06-08 ASSESSMENT — PAIN - FUNCTIONAL ASSESSMENT: PAIN_FUNCTIONAL_ASSESSMENT: ACTIVITIES ARE NOT PREVENTED

## 2022-06-08 ASSESSMENT — PAIN DESCRIPTION - LOCATION: LOCATION: HEAD

## 2022-06-08 NOTE — ED PROVIDER NOTES
[address]  eMERGENCY dEPARTMENT eNCOUnter   Attending Attestation     Pt Name: Carli Juarez  MRN: [id number]  [place] 1950  Date of evaluation: 6/7/22    History, EXAM, MDM:    Carli Juarez is a 66 y.o. female who presents with Abnormal Lab  Sent to the emergency department for evaluation of renal failure. Patient has CKD. Baseline creatinine around 1.4-1.7 with BUN of 60-80. Recently creatinine has been 2.2 and BUN of 100. Nephrologist has been trying to manage as an outpatient but is not improving. She had been taking Bumex this was held. Patient denies any confusion or chest pain. She reports that she is urinating okay. Her blood pressure is quite elevated. She is recently been put on hydralazine. Blood work completed today, patient does remain quite uremic at 80. Nephrologist would like her placed in the hospital on IV fluids. Vitals:   Vitals:    06/07/22 1912   BP: (!) 193/44   Pulse: 58   Resp: 18   Temp: 98.3 °F (36.8 °C)   SpO2: 100%     I performed a history and physical examination of the patient and discussed management with the resident. I reviewed the residents note and agree with the documented findings and plan of care. Any areas of disagreement are noted on the chart. I was personally present for the key portions of any procedures. I have documented in the chart those procedures where I was not present during the key portions. I have personally reviewed all images and agree with the resident's interpretation. I have reviewed the emergency nurses triage note. I agree with the chief complaint, past medical history, past surgical history, allergies, medications, social and family history as documented unless otherwise noted below. Documentation of the HPI, Physical Exam and Medical Decision Making performed by medical students or scribes is based on my personal performance of the HPI, PE and MDM.  For Phys Assistant/ Nurse Practitioner cases/documentation I have had a face to face evaluation of this patient and have completed at least one if not all key elements of the E/M (history, physical exam, and MDM). Additional findings are as noted.     Carmelina Alonzo MD  Attending Emergency  Physician                Carmelina Alonzo MD  06/08/22 8630

## 2022-06-08 NOTE — H&P
[address] [facility]     HISTORY AND PHYSICAL EXAMINATION            Date:   6/8/2022  Patientname:  Kacie Neville  Date of admission:  6/7/2022  7:39 PM  MRN:   [id number]  Account:  [de-identified]  YOB: 1950  PCP:    Nila Pelayo MD  Room:   2057/2057-01  Code Status:    Full Code    CHIEF COMPLAINT     Chief Complaint   Patient presents with    Abnormal Lab       HISTORY OF PRESENT ILLNESS  (Character, Onset, Location, Duration,  Exacerbating/RelievingFactors, Radiation,   Associated Symptoms, Severity )      The patient is a 66 y.o.  female, with a history of chronic combined CHF, asthma, cellulitis, paroxysmal A. fib, HTN, lymphedema, CKD stage III, and diabetes mellitus type II, who presents with normal lab. According to patient, she had routine lab work completed earlier today and received a phone call from her nephrologist, Dr. Myra Oneal, instructing her to come to the ED due to elevated BUN and creatinine. Symptoms are associated with edema in bilateral lower extremities, which are currently wrapped with Unna boots. Patient denies further complaints and states she wants to be discharged home first thing in the morning. Denies fever, chills, chest pain, cough, abdominal pain, nausea, vomiting, diarrhea, and urinary symptoms. Denies changes in urination, states that she is not going any less than usual.  There are no aggravating or alleviating factors. Patient's Bumex was recently discontinued and started on hydralazine; however, BUN and creatinine have increased despite discontinuation of diuretic. Denies changes in p.o. intake. Symptoms are reported as mild and unchanged from baseline.     PAST MEDICAL HISTORY   Patient  has a past medical history of Acute CVA (cerebrovascular accident) ([facility] [address] Altered mental status, Arthritis, Brain mass, Cellulitis and abscess, Cellulitis and abscess of unspecified site, Cellulitis of both lower extremities, Cellulitis of left lower extremity, Cellulitis of lower extremity, CHF (congestive heart failure) ([facility] [address] Chronic kidney disease, unspecified, Coronary atherosclerosis of native coronary artery, Essential hypertension, Essential hypertension, malignant, Hallucinations, Hard of hearing, Head contusion, Hypokalemia, Iron deficiency anemia, unspecified, Meningioma ([facility] [address] Myocardial infarction ([facility] [address] Other and unspecified hyperlipidemia, Pure hypercholesterolemia, Toxic metabolic encephalopathy, Type II or unspecified type diabetes mellitus without mention of complication, not stated as uncontrolled, Unspecified asthma(493.90), Unspecified venous (peripheral) insufficiency, Unspecified vitamin D deficiency, and UTI (urinary tract infection). PAST SURGICAL HISTORY    Patient  has a past surgical history that includes Tonsillectomy and adenoidectomy; Coronary artery bypass graft; intubation (3/7/2022); Thoracentesis (3/10/2022); Upper gastrointestinal endoscopy (N/A, 3/15/2022); and Colonoscopy (N/A, 3/15/2022). FAMILY HISTORY    Patient family history includes Diabetes in her maternal grandmother, mother, and sister; Heart Disease in her father and mother; High Blood Pressure in her sister. SOCIAL HISTORY    Patient  reports that she quit smoking about 22 years ago. She has a 2.50 pack-year smoking history. She has never used smokeless tobacco. She reports previous alcohol use. She reports that she does not use drugs. HOME MEDICATIONS        Prior to Admission medications    Medication Sig Start Date End Date Taking?  Authorizing Provider   hydrALAZINE (APRESOLINE) 10 MG tablet Take 10 mg by mouth 3 times daily   Yes Historical Provider, MD   vitamin D (ERGOCALCIFEROL) 1.25 MG (36328 UT) CAPS capsule Take 1 capsule by mouth once a week Sundays 4/28/22  Yes Jennifer Juarez MD   amiodarone (CORDARONE) 200 MG tablet Take 1 tablet by mouth daily 4/15/22  Yes Jennifer Juarez MD   ferrous sulfate (IRON 325) 325 (65 Fe) MG tablet Take 1 tablet by mouth daily (with breakfast) 3/31/22  Yes Heidy Becerra MD   apixaban (ELIQUIS) 2.5 MG TABS tablet Take 1 tablet by mouth 2 times daily 3/16/22  Yes Merry Marcano MD   allopurinol (ZYLOPRIM) 300 MG tablet Take 1 tablet by mouth daily Per rheumatologist 2/15/22  Yes Nilsa Allen MD   atorvastatin (LIPITOR) 40 MG tablet Take 1 tablet by mouth once daily 2/15/22  Yes Nilsa Allen MD   desloratadine (CLARINEX) 5 MG tablet Take 1 tablet by mouth once daily 2/15/22  Yes Nilsa Allen MD   magnesium oxide (MAG-OX) 400 (241.3 Mg) MG TABS tablet Take 1 tablet by mouth twice daily 2/15/22  Yes Nilsa Allen MD   metoprolol tartrate (LOPRESSOR) 50 MG tablet Take 1 tablet by mouth 2 times daily 2/15/22  Yes Nilsa Allen MD   miconazole (MICOTIN) 2 % powder Apply topically 2 times daily UNDER THE SKIN FOLDS LONG TERM 2/15/22  Yes Nilsa Allen MD   Multiple Vitamins-Minerals (THERAPEUTIC MULTIVITAMIN-MINERALS) tablet Take 1 tablet by mouth daily 2/15/22  Yes Nilsa Allen MD   Blood Pressure KIT Diagnosis: HTN. Needs to check blood pressure 1-2 times a day until stable, then once a day. Goal blood pressure less than 135/85, and above 110/60. 5/26/22   Nilsa Allen MD   saline nasal gel (AYR) GEL by Nasal route as needed for Congestion For nose bleeds, 2-3 times a day intranasal prn 4/28/22   Nilsa Allen MD   Nebulizers (COMPRESSOR/NEBULIZER) MISC Nebulizer with compressor. Disposable Med Nebs 2 /month. Reusable Med Nebs 1 per 6 months. Aerosol Mask 1 per month. Replacement Filters 2 per month. All other related supplies as needed per month. Medications being used: Albuterol . 083 unit dose vial. Frequency: every 6 hrs x 4/day . Length of Need: 1 2/22/22   Heidy Becerra MD   albuterol (PROVENTIL) (2.5 MG/3ML) 0.083% nebulizer solution Take 3 mLs by nebulization every 6 hours as needed for Wheezing or Shortness of Breath 2/22/22 Armand Pearce MD   albuterol sulfate HFA (PROAIR HFA) 108 (90 Base) MCG/ACT inhaler Inhale 2 puffs into the lungs every 6 hours as needed for Wheezing or Shortness of Breath (cough) 2/22/22   Armand Pearce MD   [facility]. Devices (ADJUST FOLD CANE/YORK HANDLE) MISC Use daily for walking 2/22/22   Armand Pearce MD   Handicap Placard MISC by Does not apply route Expires on 12/30/25 2/22/22   Armand Pearce MD   FreeStyle Lancets MISC 1 each by Does not apply route daily Patient needs to contact office before any further refills will be approved 2/15/22   Baron Birch MD   blood glucose test strips (FREESTYLE LITE) strip 1 each by In Vitro route daily As needed. 3/19/21   Baron Birch MD   Blood Pressure KIT 1 kit by Does not apply route three times daily 12/19/19   Portillo Faye MD   blood glucose monitor kit and supplies 1 kit by Other route three times daily Dispense Butterfly Elite CBG Device 8/20/19   Caroline Conrad MD       ALLERGIES      Latex, Aleve [naproxen sodium], Pioglitazone, Claritin [loratadine], Keflex [cephalexin], and Lisinopril    REVIEW OF SYSTEMS     Review of Systems   Constitutional: Negative for chills, diaphoresis and fever. HENT: Negative for congestion and sore throat. Respiratory: Negative for cough, shortness of breath and wheezing. Cardiovascular: Positive for leg swelling (Unna boots on bilateral lower extremities). Negative for chest pain and palpitations. Gastrointestinal: Negative for abdominal pain, constipation, diarrhea, nausea and vomiting. Genitourinary: Negative for dysuria, frequency and urgency. Musculoskeletal: Negative for back pain and myalgias. Skin: Negative for rash. Neurological: Negative for dizziness, weakness and headaches. Psychiatric/Behavioral: The patient is not nervous/anxious.       PHYSICAL EXAM      BP (!) 181/57   Pulse 66   Temp 97.9 °F (36.6 °C) (Oral)   Resp 17   SpO2 98%  There is no height or weight on file to calculate BMI. Physical Exam  Constitutional:       General: She is not in acute distress. Appearance: She is well-developed. She is not diaphoretic. HENT:      Head: Normocephalic and atraumatic. Eyes:      Conjunctiva/sclera: Conjunctivae normal.      Pupils: Pupils are equal, round, and reactive to light. Neck:      Trachea: No tracheal deviation. Cardiovascular:      Rate and Rhythm: Normal rate and regular rhythm. Heart sounds: Normal heart sounds. No murmur heard. No friction rub. No gallop. Pulmonary:      Effort: Pulmonary effort is normal. No respiratory distress. Breath sounds: Normal breath sounds. No wheezing or rales. Chest:      Chest wall: No tenderness. Abdominal:      General: Bowel sounds are normal. There is no distension. Palpations: Abdomen is soft. Tenderness: There is no abdominal tenderness. There is no guarding. Musculoskeletal:         General: No tenderness. Normal range of motion. Cervical back: Normal range of motion and neck supple. Right lower leg: Edema present. Left lower leg: Edema present. Comments: Unna boots in place on bilateral lower extremities   Lymphadenopathy:      Cervical: No cervical adenopathy. Skin:     General: Skin is warm and dry. Coloration: Skin is not pale. Findings: No erythema or rash. Neurological:      Mental Status: She is alert and oriented to person, place, and time. Motor: No seizure activity. Coordination: Coordination normal.   Psychiatric:         Behavior: Behavior normal.         Thought Content: Thought content normal.       DIAGNOSTICS      EKG:   Labs:  CBC:   Recent Labs     06/07/22  2150   WBC 9.8   HGB 13.3        BMP:    Recent Labs     06/06/22  1305 06/07/22  2145    138   K 4.9 5.0    104   CO2 25 24   * 99*   CREATININE 2.25* 1.89*   GLUCOSE 108* 124*     S. Calcium:  Recent Labs     06/07/22  2145   CALCIUM 8.9     S. Ionized Calcium:No results for input(s): IONCA in the last 72 hours. S. Magnesium:  Recent Labs     06/07/22  2145   MG 2.4     S. Phosphorus:No results for input(s): PHOS in the last 72 hours. S. Glucose:No results for input(s): POCGLU in the last 72 hours. Glycosylated hemoglobin A1C:   Lab Results   Component Value Date    LABA1C 4.6 03/31/2022     Hepatic: No results for input(s): AST, ALT, ALB, ALKPHOS in the last 72 hours. Invalid input(s):  PROT,  BILITOT,  BILIDIR  CARDIAC ENZY: No results for input(s): CKTOTAL, CKMB, CKMBINDEX, TROPHS, MYOGLOBIN in the last 72 hours. INR: No results for input(s): INR in the last 72 hours. BNP: No results for input(s): PROBNP in the last 72 hours. ABGs: No results for input(s): PH, PCO2, PO2, HCO3, O2SAT in the last 72 hours. Lipids: No results for input(s): CHOL, TRIG, HDL, LDL, LDLCALC in the last 72 hours. Pancreatic functions:No results for input(s): LIPASE, AMYLASE in the last 72 hours. Malachi Charlotte: No results for input(s): LACTA in the last 72 hours. Thyroid functions:   Lab Results   Component Value Date    TSH 2.60 12/06/2021      U/A:  Recent Labs     06/06/22  1305   COLORU Yellow   WBCUA 6 TO 9   RBCUA TOO NUMEROUS TO COUNT   BACTERIA None   SPECGRAV 1.019   LEUKOCYTESUR NEGATIVE   GLUCOSEU TRACE*     No results for input(s): COVID19 in the last 72 hours. Imaging/Diagonstics:     XR CHEST (2 VW)    Result Date: 6/7/2022  EXAMINATION: TWO XRAY VIEWS OF THE CHEST 6/7/2022 9:38 pm COMPARISON: Chest 05/18/2022 HISTORY: ORDERING SYSTEM PROVIDED HISTORY: eval pulm edema TECHNOLOGIST PROVIDED HISTORY: eval pulm edema Reason for Exam: PT CO mild cough with SOB and fatigue X several days. FINDINGS: The cardiac silhouette is enlarged. Calcifications involving the aorta reflect atherosclerosis. The mediastinal and hilar silhouettes appear unremarkable. Chronic appearing coarse interstitial densities predominantly parahilar regions and lower lungs.   Chronic appearing coarse interstitial densities predominate perihilar regions and lung bases, typical of sequela from smoking or other previous infectious/inflammatory process. No dense consolidation or pleural effusion evident. No pneumothorax is seen. No acute osseous abnormality is identified. Sequela from prior median sternotomy. Hyperkyphosis and barrel chest configuration. Bones appear osteoporotic. 1. No definite acute pulmonary disease. 2.  Pulmonary sequela typical of that seen with smoking, including possible COPD; correlate with clinical history. 3. Calcific atherosclerosis aorta. 4. Cardiomegaly. XR CHEST (2 VW)    Result Date: 5/18/2022  EXAMINATION: TWO XRAY VIEWS OF THE CHEST 5/18/2022 5:14 pm COMPARISON: 03/24/2022 HISTORY: ORDERING SYSTEM PROVIDED HISTORY: ams, chf TECHNOLOGIST PROVIDED HISTORY: ams, chf Reason for Exam: ams, chf FINDINGS: Cardiac size is stable. Stable opacity in the medial right base. The pulmonary vascularity is hazy and indistinct. No pneumothorax. Small bilateral pleural effusions. Prominent naima suggesting pulmonary arterial hypertension. Postsurgical changes overlying the mediastinum. Mild pulmonary vascular congestion Prominent naima suggesting pulmonary arterial hypertension. ASSESSMENT  and  PLAN     Principal Problem:    Acute kidney injury superimposed on chronic kidney disease (HCC)  Active Problems:    Anticoagulated    Type 2 diabetes mellitus with stage 3 chronic kidney disease, without long-term current use of insulin (HCC)    Stage 3 chronic kidney disease (HCC)  Resolved Problems:    * No resolved hospital problems.  *    Plan:    Acute Kidney Injury  -Creatinine 1.89, BUN 99;   --Baseline creatinine near 1.7  --GFR 26  -History of CKD stage III  -Nephrology consulted in ED  -IVF -normal saline at 75 mL an hour   -hold diuretics/nephrotoxic medications  -monitor BMP     Diabetes Mellitus  -Glucose 124 on arrival  -Patient not currently on antidiabetic medication  -Monitor glucose on daily BMP  -Carb controlled, low sodium diet    Chronic Anticoagulation  Patient anticoagulated d/t paroxysmal A. fib  -Continue home dose Eliquis  -monitor for signs of bleeding    Patient with Unna boots on bilateral lower extremities  -- Reports chronic wounds on BLE managed by wound care  --- Patient has appointment in a.m. to remove Unna boots  -Consult wound care to eval and treat    Consultations:     August Orta 56, APRN - CNP   6/8/2022  4:21 AM    [facility] [place] [address], [address] [place] [address]. Phone  and add on       I have discussed the care of Alida Councilman ,   including pertinent history and exam findings,      6/8/22   with the Cele Molina CNP  I have seen and examined the patient and the key elements of all parts of the encounter have been performed by me . I agree with the assessment, plan and orders as documented by the resident. Hospital Problems           Last Modified POA    * (Principal) Acute kidney injury superimposed on chronic kidney disease ([address] 6/8/2022 Yes    Bilateral lower extremity edema 6/8/2022 Yes    Anticoagulated 6/8/2022 Yes    Venous insufficiency of both lower extremities 6/8/2022 Yes    Type 2 diabetes mellitus with stage 3 chronic kidney disease, without long-term current use of insulin ([address] 6/8/2022 Yes    Stage 3 chronic kidney disease ([address] 6/8/2022 Yes    COPD (chronic obstructive pulmonary disease) ([address] 6/8/2022 Yes    Hypertensive emergency 6/8/2022 Yes    Chronic a-fib ([address] 6/8/2022 Yes            --       Medications: Allergies:     Allergies   Allergen Reactions    Latex Rash    Aleve [Naproxen Sodium]      Chronic kidney disease stage III, CHF    Pioglitazone Other (See Comments)     Congestive heart failure    Claritin [Loratadine]     Keflex [Cephalexin]     Lisinopril Needs clarification of contraindication       Current Meds:   Scheduled Meds:    sodium chloride flush  5-40 mL IntraVENous 2 times per day    [Held by provider] allopurinol  300 mg Oral Daily    amiodarone  200 mg Oral Daily    apixaban  2.5 mg Oral BID    atorvastatin  40 mg Oral Daily    cetirizine  5 mg Oral Daily    ferrous sulfate  325 mg Oral Daily with breakfast    magnesium oxide  400 mg Oral BID    miconazole   Topical BID    therapeutic multivitamin-minerals  1 tablet Oral Daily    metoprolol tartrate  50 mg Oral BID    hydrALAZINE  25 mg Oral TID     Continuous Infusions:    sodium chloride       PRN Meds: sodium chloride flush, sodium chloride, ondansetron **OR** ondansetron, acetaminophen **OR** acetaminophen, albuterol, albuterol sulfate HFA, saline nasal gel, hydrALAZINE      ---- ;     MD PERRY [address], [address] [place] [address].    Phone [phone]   Fax: [phone]  Answering Service: [phone]

## 2022-06-08 NOTE — PROGRESS NOTES
Patient admitted into room 2057. Patient is alert and oriented. Patient's daughter at bedside. Vitals taken, blood pressure elevated. Will give nightly Lopressor and monitor. Patient oriented to the room, call light within reach.

## 2022-06-08 NOTE — ED PROVIDER NOTES
[address] [facility]  Emergency Department Encounter  Emergency Medicine Resident     Pt Name: Betty Ni  [facility]:[id number]  [place] 1950  Date of evaluation: 6/7/22  PCP:  Renata Bynum MD    CHIEF COMPLAINT       Chief Complaint   Patient presents with    Abnormal Lab     HISTORY OF PRESENT ILLNESS  (Location/Symptom, Timing/Onset, Context/Setting, Quality, Duration, ModifyingFactors, Severity.)      Betty Ni is a 66 y.o. female with PMH of CKD, diabetes, CAD s/p stents on Plavix, COPD, hypertension, hyperlipidemia, CHF, atrial fibrillation on Eliquis who was sent in by her nephrologist due to elevated BUN/creatinine. Patient with CKD, baseline creatinine 1.7. Had BMP 5 days ago at which time creatinine was 2.07. Advised to hold Bumex at that point however today had repeat BMP which showed creatinine of 2.25. Hydralazine started 5/26, otherwise no recent medication changes. UA yesterday with large amount of RBCs and 4+ protein. .     Patient denies any symptoms currently. No fever, chest pain, shortness of breath, dumping, nausea, vomiting, diarrhea, constipation, urinary symptoms. Daughter does note the patient has begun having incontinence/frequent urination and feels that she is downplaying her symptoms to patient want to be admitted to the hospital.  Daughter also notes concerns that patient may be developing dementia and is becoming more paranoid. At this time patient is still making her own medical decisions however family is trying to determine if patient has medical decision-making capacity. Echo 2/6/2022 showed EF greater than 55% with moderate LVH but did show mild-moderate aortic insufficiency.     PAST MEDICAL / SURGICAL / SOCIAL /FAMILY HISTORY      has a past medical history of Acute CVA (cerebrovascular accident) ([facility] [address] Altered mental status, Arthritis, Brain mass, Cellulitis and abscess, Cellulitis and abscess of unspecified site, Cellulitis of both lower extremities, Cellulitis of left lower extremity, Cellulitis of lower extremity, CHF (congestive heart failure) ([facility] [address] Chronic kidney disease, unspecified, Coronary atherosclerosis of native coronary artery, Essential hypertension, Essential hypertension, malignant, Hallucinations, Hard of hearing, Head contusion, Hypokalemia, Iron deficiency anemia, unspecified, Meningioma ([facility] [address] Myocardial infarction ([facility] [address] Other and unspecified hyperlipidemia, Pure hypercholesterolemia, Toxic metabolic encephalopathy, Type II or unspecified type diabetes mellitus without mention of complication, not stated as uncontrolled, Unspecified asthma(493.90), Unspecified venous (peripheral) insufficiency, Unspecified vitamin D deficiency, and UTI (urinary tract infection). No other pertinent PMH on review with patient/guardian. has a past surgical history that includes Tonsillectomy and adenoidectomy; Coronary artery bypass graft; intubation (3/7/2022); Thoracentesis (3/10/2022); Upper gastrointestinal endoscopy (N/A, 3/15/2022); and Colonoscopy (N/A, 3/15/2022). No other pertinent PSH on review with patient/guardian. Social History     Socioeconomic History    Marital status:       Spouse name: Not on file    Number of children: 10    Years of education: Not on file    Highest education level: Not on file   Occupational History    Not on file   Tobacco Use    Smoking status: Former Smoker     Packs/day: 0.25     Years: 10.00     Pack years: 2.50     Quit date: 2000     Years since quittin.4    Smokeless tobacco: Never Used   Vaping Use    Vaping Use: Never used   Substance and Sexual Activity    Alcohol use: Not Currently     Alcohol/week: 0.0 standard drinks    Drug use: No    Sexual activity: Yes     Partners: Female   Other Topics Concern    Not on file   Social History Narrative    Not on file     Social Determinants of Health     Financial Resource Strain: Low Risk     Difficulty of Paying Living Expenses: Not very hard   Food Insecurity: No Food Insecurity    Worried About Running Out of Food in the Last Year: Never true    Holden of Food in the Last Year: Never true   Transportation Needs:     Lack of Transportation (Medical): Not on file    Lack of Transportation (Non-Medical): Not on file   Physical Activity:     Days of Exercise per Week: Not on file    Minutes of Exercise per Session: Not on file   Stress:     Feeling of Stress : Not on file   Social Connections:     Frequency of Communication with Friends and Family: Not on file    Frequency of Social Gatherings with Friends and Family: Not on file    Attends [employer] Services: Not on file    Active Member of [address] [place] [address] Fashism or Organizations: Not on file    Attends Club or Organization Meetings: Not on file    Marital Status: Not on file   Intimate Partner Violence:     Fear of Current or Ex-Partner: Not on file    Emotionally Abused: Not on file    Physically Abused: Not on file    Sexually Abused: Not on file   Housing Stability:     Unable to Pay for Housing in the Last Year: Not on file    Number of [place] in the Last Year: Not on file    Unstable Housing in the Last Year: Not on file       I counseled the patient against using tobacco products. Family History   Problem Relation Age of Onset    Diabetes Mother     Heart Disease Mother     Heart Disease Father     Diabetes Sister     High Blood Pressure Sister     Diabetes Maternal Grandmother      No other pertinent FamHx on review with patient/guardian. Allergies:  Latex, Aleve [naproxen sodium], Pioglitazone, Claritin [loratadine], Keflex [cephalexin], and Lisinopril    Home Medications:  Prior to Admission medications    Medication Sig Start Date End Date Taking?  Authorizing Provider   hydrALAZINE (APRESOLINE) 10 MG tablet Take 10 mg by mouth 3 times daily   Yes Historical Provider, MD   vitamin D (ERGOCALCIFEROL) 1.25 MG (68817 UT) CAPS capsule Take 1 capsule by mouth once a week Sundays 4/28/22  Yes Renata Bynum MD   amiodarone (CORDARONE) 200 MG tablet Take 1 tablet by mouth daily 4/15/22  Yes Renata Bynum MD   ferrous sulfate (IRON 325) 325 (65 Fe) MG tablet Take 1 tablet by mouth daily (with breakfast) 3/31/22  Yes Kobe English MD   apixaban (ELIQUIS) 2.5 MG TABS tablet Take 1 tablet by mouth 2 times daily 3/16/22  Yes Barry Araya MD   allopurinol (ZYLOPRIM) 300 MG tablet Take 1 tablet by mouth daily Per rheumatologist 2/15/22  Yes Renata Bynum MD   atorvastatin (LIPITOR) 40 MG tablet Take 1 tablet by mouth once daily 2/15/22  Yes Renata Bynum MD   desloratadine (CLARINEX) 5 MG tablet Take 1 tablet by mouth once daily 2/15/22  Yes Renata Bynum MD   magnesium oxide (MAG-OX) 400 (241.3 Mg) MG TABS tablet Take 1 tablet by mouth twice daily 2/15/22  Yes Renata Bynum MD   metoprolol tartrate (LOPRESSOR) 50 MG tablet Take 1 tablet by mouth 2 times daily 2/15/22  Yes Renata Bynum MD   miconazole (MICOTIN) 2 % powder Apply topically 2 times daily UNDER THE SKIN FOLDS LONG TERM 2/15/22  Yes Renata Bynum MD   Multiple Vitamins-Minerals (THERAPEUTIC MULTIVITAMIN-MINERALS) tablet Take 1 tablet by mouth daily 2/15/22  Yes Renata Bynum MD   Blood Pressure KIT Diagnosis: HTN. Needs to check blood pressure 1-2 times a day until stable, then once a day. Goal blood pressure less than 135/85, and above 110/60. 5/26/22   Renata Bynum MD   saline nasal gel (AYR) GEL by Nasal route as needed for Congestion For nose bleeds, 2-3 times a day intranasal prn 4/28/22   Renata Bynum MD   Nebulizers (COMPRESSOR/NEBULIZER) MISC Nebulizer with compressor. Disposable Med Nebs 2 /month. Reusable Med Nebs 1 per 6 months. Aerosol Mask 1 per month. Replacement Filters 2 per month. All other related supplies as needed per month. Medications being used: Albuterol . 083 unit dose vial. Frequency: every 6 hrs x 4/day . Length of Need: 1 2/22/22   Sophie Long MD   albuterol (PROVENTIL) (2.5 MG/3ML) 0.083% nebulizer solution Take 3 mLs by nebulization every 6 hours as needed for Wheezing or Shortness of Breath 2/22/22   Sophie Long MD   albuterol sulfate HFA (PROAIR HFA) 108 (90 Base) MCG/ACT inhaler Inhale 2 puffs into the lungs every 6 hours as needed for Wheezing or Shortness of Breath (cough) 2/22/22   Sophie Long MD   Misc. Devices (ADJUST FOLD CANE/YORK HANDLE) MISC Use daily for walking 2/22/22   Sophie Long MD   Handicap Placard MISC by Does not apply route Expires on 12/30/25 2/22/22   Sophie Long MD   FreeStyle Lancets MISC 1 each by Does not apply route daily Patient needs to contact office before any further refills will be approved 2/15/22   Kamla De Los Santos MD   blood glucose test strips (FREESTYLE LITE) strip 1 each by In Vitro route daily As needed. 3/19/21   Kamla De Los Santos MD   Blood Pressure KIT 1 kit by Does not apply route three times daily 12/19/19   Lamont Neves MD   blood glucose monitor kit and supplies 1 kit by Other route three times daily Dispense Butterfly Elite CBG Device 8/20/19   Rachel Fothergill, MD       REVIEW OF SYSTEMS    (2-9 systems for level 4, 10 ormore for level 5)      Review of Systems   Constitutional: Negative for fever. Eyes: Negative for visual disturbance. Respiratory: Negative for cough and shortness of breath. Cardiovascular: Negative for chest pain. Gastrointestinal: Negative for abdominal pain, blood in stool, constipation, diarrhea, nausea and vomiting. Genitourinary: Positive for frequency. Negative for dysuria, urgency, vaginal bleeding and vaginal discharge. Positive for incontinence   Skin: Negative for rash. Allergic/Immunologic: Negative for immunocompromised state. Neurological: Negative for headaches. Hematological: Does not bruise/bleed easily.      PHYSICAL EXAM   (up to 7 for level 4, 8 or more for level 5)      INITIAL VITALS: BP (!) 176/49   Pulse 60   Temp 98.2 °F (36.8 °C) (Oral)   Resp 16   SpO2 98%     Physical Exam  Constitutional:       General: She is not in acute distress. Appearance: Normal appearance. She is not ill-appearing, toxic-appearing or diaphoretic. HENT:      Head: Normocephalic and atraumatic. Right Ear: External ear normal.      Left Ear: External ear normal.   Eyes:      General:         Right eye: No discharge. Left eye: No discharge. Cardiovascular:      Rate and Rhythm: Normal rate and regular rhythm. Pulses: Normal pulses. Heart sounds: No murmur heard. Pulmonary:      Effort: Pulmonary effort is normal. No respiratory distress. Breath sounds: Normal breath sounds. No wheezing, rhonchi or rales. Abdominal:      Palpations: Abdomen is soft. Tenderness: There is no abdominal tenderness. There is no right CVA tenderness, left CVA tenderness, guarding or rebound. Musculoskeletal:      Right lower leg: No edema. Left lower leg: No edema. Skin:     Capillary Refill: Capillary refill takes less than 2 seconds. Neurological:      General: No focal deficit present. Mental Status: She is alert. DIFFERENTIAL  DIAGNOSIS     PLAN (LABS / IMAGING / EKG):  Orders Placed This Encounter   Procedures    XR CHEST (2 VW)    Basic Metabolic Panel    Magnesium    SODIUM, URINE, RANDOM    CREATININE, RANDOM URINE    UREA NITROGEN, URINE    CBC with Auto Differential    Magnesium    Protime-INR    Urinalysis with microscopic    Sodium, urine, random    Protein, urine, random    Brain Natriuretic Peptide    Protein / Creatinine Ratio, Urine    CBC with Auto Differential    Basic Metabolic Panel w/ Reflex to MG    ADULT DIET; Regular; 4 carb choices (60 gm/meal);  Low Sodium (2 gm)    Measure post void residual    Vital signs per unit routine    Notify physician    Up with assistance    Daily weights    Intake and output    Turn or assist with turn approximately every 2 hours if patient is unable to turn self. Remind patient to turn if necessary.  Assess skin per unit guidelines    Pad/offload medical devices    Maintain HOB at the lowest elevation consistent with medical plan of care    Use lift equipment for lifting patient    Maintain heels off of bed at all times    Strict intake and output    Full Code    Inpatient consult to Nephrology    Inpatient consult to Internal Medicine    Inpatient consult to Spiritual Services    OT eval and treat    PT eval and treat    Initiate Oxygen Therapy Protocol    Pulse Oximetry Spot Check    Wound ostomy eval and treat    ADMIT TO INPATIENT     MEDICATIONS ORDERED:  Orders Placed This Encounter   Medications    DISCONTD: 0.9 % sodium chloride infusion    sodium chloride flush 0.9 % injection 5-40 mL    sodium chloride flush 0.9 % injection 5-40 mL    0.9 % sodium chloride infusion    OR Linked Order Group     ondansetron (ZOFRAN-ODT) disintegrating tablet 4 mg     ondansetron (ZOFRAN) injection 4 mg    OR Linked Order Group     acetaminophen (TYLENOL) tablet 650 mg     acetaminophen (TYLENOL) suppository 650 mg    DISCONTD: heparin (porcine) injection 5,000 Units    albuterol (PROVENTIL) nebulizer solution 2.5 mg     Order Specific Question:   Initiate RT Bronchodilator Protocol     Answer: Yes    albuterol sulfate HFA (PROVENTIL;VENTOLIN;PROAIR) 108 (90 Base) MCG/ACT inhaler 2 puff     Order Specific Question:   Initiate RT Bronchodilator Protocol     Answer:    Yes    allopurinol (ZYLOPRIM) tablet 300 mg    amiodarone (CORDARONE) tablet 200 mg    apixaban (ELIQUIS) tablet 2.5 mg     Order Specific Question:   Indication of Use     Answer:   A Fib/A Flutter    atorvastatin (LIPITOR) tablet 40 mg    cetirizine (ZYRTEC) tablet 5 mg    ferrous sulfate (IRON 325) tablet 325 mg    DISCONTD: hydrALAZINE (APRESOLINE) tablet 10 mg    magnesium oxide (MAG-OX) tablet 400 mg    miconazole (MICOTIN) 2 % powder    therapeutic multivitamin-minerals 1 tablet    saline nasal gel (AYR)    metoprolol tartrate (LOPRESSOR) tablet 50 mg    hydrALAZINE (APRESOLINE) tablet 25 mg    DISCONTD: labetalol (NORMODYNE;TRANDATE) injection syringe 10 mg    DISCONTD: labetalol (NORMODYNE;TRANDATE) injection syringe 10 mg    hydrALAZINE (APRESOLINE) injection 20 mg     DIAGNOSTIC RESULTS / EMERGENCY DEPARTMENT COURSE / MDM     LABS:  Results for orders placed or performed during the hospital encounter of 54/45/49   Basic Metabolic Panel   Result Value Ref Range    Glucose 124 (H) 70 - 99 mg/dL    BUN 99 (HH) 8 - 23 mg/dL    CREATININE 1.89 (H) 0.50 - 0.90 mg/dL    Calcium 8.9 8.6 - 10.4 mg/dL    Sodium 138 135 - 144 mmol/L    Potassium 5.0 3.7 - 5.3 mmol/L    Chloride 104 98 - 107 mmol/L    CO2 24 20 - 31 mmol/L    Anion Gap 10 9 - 17 mmol/L    GFR Non-African American 26 (L) >60 mL/min    GFR  32 (L) >60 mL/min    GFR Comment         Magnesium   Result Value Ref Range    Magnesium 2.4 1.6 - 2.6 mg/dL   SODIUM, URINE, RANDOM   Result Value Ref Range    Sodium,Ur <20 mmol/L   CREATININE, RANDOM URINE   Result Value Ref Range    Creatinine, Ur 65.8 28.0 - 217.0 mg/dL   UREA NITROGEN, URINE   Result Value Ref Range    Urea Nitrogen, Ur 902 mg/dL   CBC with Auto Differential   Result Value Ref Range    WBC 9.8 3.5 - 11.0 k/uL    RBC 4.41 4.0 - 5.2 m/uL    Hemoglobin 13.3 12.0 - 16.0 g/dL    Hematocrit 41.4 36 - 46 %    MCV 93.7 80 - 100 fL    MCH 30.1 26 - 34 pg    MCHC 32.1 31 - 37 g/dL    RDW 16.9 (H) 11.5 - 14.9 %    Platelets 501 348 - 662 k/uL    MPV 7.5 6.0 - 12.0 fL    Seg Neutrophils 77 (H) 36 - 66 %    Lymphocytes 16 (L) 24 - 44 %    Monocytes 6 1 - 7 %    Eosinophils % 1 0 - 4 %    Basophils 0 0 - 2 %    Segs Absolute 7.50 1.3 - 9.1 k/uL    Absolute Lymph # 1.60 1.0 - 4.8 k/uL    Absolute Mono # 0.60 0.1 - 1.3 k/uL    Absolute Eos # 0.10 0.0 - 0.4 k/uL    Basophils Absolute 0.00 0.0 - 0.2 k/uL   Brain Natriuretic Peptide   Result Value Ref Range    Pro-BNP 9,061 (H) <300 pg/mL   CBC with Auto Differential   Result Value Ref Range    WBC 8.4 3.5 - 11.0 k/uL    RBC 4.25 4.0 - 5.2 m/uL    Hemoglobin 12.8 12.0 - 16.0 g/dL    Hematocrit 39.8 36 - 46 %    MCV 93.5 80 - 100 fL    MCH 30.2 26 - 34 pg    MCHC 32.3 31 - 37 g/dL    RDW 16.5 (H) 11.5 - 14.9 %    Platelets 758 111 - 298 k/uL    MPV 7.5 6.0 - 12.0 fL    Seg Neutrophils 74 (H) 36 - 66 %    Lymphocytes 15 (L) 24 - 44 %    Monocytes 8 (H) 1 - 7 %    Eosinophils % 2 0 - 4 %    Basophils 1 0 - 2 %    Segs Absolute 6.40 1.3 - 9.1 k/uL    Absolute Lymph # 1.20 1.0 - 4.8 k/uL    Absolute Mono # 0.60 0.1 - 1.3 k/uL    Absolute Eos # 0.10 0.0 - 0.4 k/uL    Basophils Absolute 0.00 0.0 - 0.2 k/uL   Basic Metabolic Panel w/ Reflex to MG   Result Value Ref Range    Glucose 113 (H) 70 - 99 mg/dL    BUN 83 (H) 8 - 23 mg/dL    CREATININE 1.92 (H) 0.50 - 0.90 mg/dL    Calcium 8.4 (L) 8.6 - 10.4 mg/dL    Sodium 141 135 - 144 mmol/L    Potassium 4.2 3.7 - 5.3 mmol/L    Chloride 106 98 - 107 mmol/L    CO2 23 20 - 31 mmol/L    Anion Gap 12 9 - 17 mmol/L    GFR Non-African American 26 (L) >60 mL/min    GFR  31 (L) >60 mL/min    GFR Comment             IMPRESSION/MDM/ED COURSE:  66 y.o. female presented with JOY, patient sent in by her nephrologist.  Patient hypertensive on initial exam but afebrile and vitals otherwise WNL. BP has been elevated at home and recently started on hydralazine for this. On exam patient is tearful, anxious stating that she does not like doctors, hospitals, does not want an IV. Heart RRR, lungs clear. Abdomen soft nontender. No CVA tenderness. Had long discussion with patient regarding concern for JOY and need for lab work. Case discussed with patient's nephrologist Dr. Christiano Hernandez prior to work-up given patient's hesitancy to proceed with work-up. He states concern of worsening BUN despite medication adjustment. Nephrology is requesting CBC, BMP, CXR, urine studies and postvoid residual.  If continued elevation in BUN without contraindication to fluid administration he would like patient admitted with NS 75 overnight. Patient does have history of GI bleed however no bleeding currently per both patient and daughter. Edema at baseline. No crackles or JVD. Will obtain CXR to evaluate fluid status. After some discussion with patient and daughter, patient is willing to proceed with lab work/IV. ED Course as of 06/08/22 1524   Tue Jun 07, 2022   2255 CBC with Auto Differential(!):    WBC 9.8   RBC 4.41   Hemoglobin Quant 13.3   Hematocrit 41.4   MCV 93.7   MCH 30.1   MCHC 32.1   RDW 16.9(!)   Platelet Count 689   MPV 7.5   Seg Neutrophils 77(!)   Lymphocytes 16(!)   Monocytes 6   Eosinophils % 1   Basophils 0   Segs Absolute 7.50   Absolute Lymph # 1.60   Absolute Mono # 0.60   Absolute Eos # 0.10   Basophils Absolute 0.00 [AF]   5651 Basic Metabolic Panel(!!):    GLUCOSE, FASTING,(!)   BUN,BUNPL 99(!!)   Creatinine 1.89(!)   CALCIUM, SERUM, 090086 8.9   Sodium 138   Potassium 5.0   Chloride 104   CO2 24   Anion Gap 10   GFR Non- 26(!)   GFR  32(!)   GFR Comment      [AF]   2259 Magnesium:    Magnesium 2.4 [AF]   2300   IMPRESSION:  1. No definite acute pulmonary disease. 2.  Pulmonary sequela typical of that seen with smoking, including possible  COPD; correlate with clinical history. 3. Calcific atherosclerosis aorta. 4. Cardiomegaly. [AF]   4363 Spoke to hospitalist NP who accepted patient for admission. NS 75 ordered discussed with nephrology previously [AF]      ED Course User Index  [AF] Abbey Jones DO     RADIOLOGY:  XR CHEST (2 VW)   Final Result   1. No definite acute pulmonary disease. 2.  Pulmonary sequela typical of that seen with smoking, including possible   COPD; correlate with clinical history. 3. Calcific atherosclerosis aorta. 4. Cardiomegaly. EKG  None    All EKG's are interpreted by the Emergency Department Physician who either signs or Co-signs this chart in the absence of a cardiologist.    PROCEDURES:  None    CONSULTS:  IP CONSULT TO NEPHROLOGY  IP CONSULT TO INTERNAL MEDICINE  IP CONSULT TO SPIRITUAL SERVICES    FINAL IMPRESSION      1. JOY (acute kidney injury) ([facility] [address]        DISPOSITION / PLAN     DISPOSITION Admitted 06/08/2022 12:09:37 AM      PATIENT REFERREDTO:  No follow-up provider specified.     DISCHARGE MEDICATIONS:  Current Discharge Medication List          Donna Mathis DO  PGY 2  Resident Physician Emergency Medicine  06/08/22 3:25 PM    (Please note that portions of this note were completed with a voice recognition program.Efforts were made to edit the dictations but occasionally words are mis-transcribed.)       Yesy Eagle DO  Resident  06/08/22 1195

## 2022-06-08 NOTE — CARE COORDINATION
CASE MANAGEMENT NOTE:    Admission Date:  2022 Naomie Streeter is a 66 y.o.  female    Admitted for : JOY (acute kidney injury) ([facility] [address] [N17.9]  Acute kidney injury superimposed on chronic kidney disease ([facility] [address] [N17.9, N18.9]    Met with:  Patient and Family    PCP:  [address] [place] [address] Drive:  [facility]      Is patient alert and oriented at time of discussion:  Yes- drowsy Dtr Kate Crisostomo answers most of the questions    Current Residence/ Living Arrangements: At home with son , dependent on care. Dtr Kate Crisostomo also helps to  for patient . Current Services PTA:  Yes- [address]    Does patient go to outpatient dialysis: No  If yes, location and chair time: NA    Is patient agreeable to VNS: No    Freedom of choice provided:  No    List of [address] provided: No    VNS chosen:  No    DME:  straight cane, walker, wheelchair and other lift chair    Home Oxygen: Yes    Nebulizer: No    CPAP/BIPAP: No    Supplier: [employer]    Potential Assistance Needed: No    SNF needed: No    Freedom of choice and list provided: No    Pharmacy:  [facility] on [address]       Is patient currently receiving oral anticoagulation therapy? Yes- Eliquis PTA for afib    Is the Patient an KATHIE SIMENTAL [facility] with Readmission Risk Score greater than 14%? No  If yes, pt needs a follow up appointment made within 7 days. Family Members/Caregivers that pt would like involved in their care:    Yes    If yes, list name here:  dtr Jerrod Rothman and Svitlana Son Matilde Sue    Transportation Provider:  Family             Discharge Plan:  22 [facility] From home with son . Dtr Kate Crisostomo helps with care. DME: cane, walker, wheelchair, lift chair,Home O2 thru [employer] VNS: none. pt refused VNS for discharge. Pt goes to [facility] for leg ulcer. Eliquis PTA for afib. Creat 1.92 today , Dr Iva Gutierrez following.  Following for needs//JF               Electronically signed by: Armen Albert RN on 2022 at 12:56 PM

## 2022-06-08 NOTE — CONSULTS
[facility]  Consult Note       NAME:  Prince Ojeda  MEDICAL RECORD NUMBER:  [id number]  AGE: 66 y.o.    GENDER: female  : 1950  TODAY'S DATE:  2022    Subjective:      Branden Mayo is a 66 y.o. female with inpatient referral to [address] [place] [address] [facility] for:  Bilateral leg wounds- unna boot change      Wound Identification:  Wound Type: venous  Contributing Factors: edema, venous stasis, lymphedema and diabetes    Wound History: pt states that the wounds started as blisters and opened up into ulcers and that she had unna boots applied last week at the wound care center  Current Wound Care Treatment:  Calamine unna boots    Patient Goal of Care:  [x] Wound Healing  [] Odor Control  [] Palliative Care  [] Pain Control   [] Other:         PAST MEDICAL HISTORY        Diagnosis Date    Acute CVA (cerebrovascular accident) ([facility] [address] 2020    Altered mental status 2021    Arthritis     Brain mass     Cellulitis and abscess     Cellulitis and abscess of unspecified site     Cellulitis of both lower extremities 2021    Cellulitis of left lower extremity 2020    Cellulitis of lower extremity 10/4/2020    CHF (congestive heart failure) (HCC)     Chronic kidney disease, unspecified     Coronary atherosclerosis of native coronary artery     Essential hypertension 2020    Essential hypertension, malignant     Hallucinations 2021    Hard of hearing     Head contusion 2020    Hypokalemia 2020    Iron deficiency anemia, unspecified     Meningioma ([facility] [address] 2021    Myocardial infarction ([facility] [address]     Other and unspecified hyperlipidemia     Pure hypercholesterolemia     Toxic metabolic encephalopathy 315    Type II or unspecified type diabetes mellitus without mention of complication, not stated as uncontrolled     Unspecified asthma(493.90)     Unspecified venous (peripheral) insufficiency     Unspecified vitamin D deficiency     UTI (urinary tract infection) 2021       PAST SURGICAL HISTORY    Past Surgical History:   Procedure Laterality Date    COLONOSCOPY N/A 3/15/2022    COLONOSCOPY DIAGNOSTIC performed by Td Sutton MD at [facility] [address]      x 3, Dr. Morena Alvarez  3/7/2022         THORACENTESIS  3/10/2022         TONSILLECTOMY AND ADENOIDECTOMY      UPPER GASTROINTESTINAL ENDOSCOPY N/A 3/15/2022    EGD BIOPSY performed by Td Sutton MD at [address]    Family History   Problem Relation Age of Onset    Diabetes Mother     Heart Disease Mother     Heart Disease Father     Diabetes Sister     High Blood Pressure Sister     Diabetes Maternal Grandmother        SOCIAL HISTORY    Social History     Tobacco Use    Smoking status: Former Smoker     Packs/day: 0.25     Years: 10.00     Pack years: 2.50     Quit date: 2000     Years since quittin.4    Smokeless tobacco: Never Used   Vaping Use    Vaping Use: Never used   Substance Use Topics    Alcohol use: Not Currently     Alcohol/week: 0.0 standard drinks    Drug use: No         ALLERGIES    Allergies   Allergen Reactions    Latex Rash    Aleve [Naproxen Sodium]      Chronic kidney disease stage III, CHF    Pioglitazone Other (See Comments)     Congestive heart failure    Claritin [Loratadine]     Keflex [Cephalexin]     Lisinopril      Needs clarification of contraindication       HOME MEDICATIONS  Prior to Admission medications    Medication Sig Start Date End Date Taking?  Authorizing Provider   hydrALAZINE (APRESOLINE) 10 MG tablet Take 10 mg by mouth 3 times daily   Yes Historical Provider, MD   vitamin D (ERGOCALCIFEROL) 1.25 MG (71227 UT) CAPS capsule Take 1 capsule by mouth once a week Sundays 4/28/22  Yes Annalee Granda MD   amiodarone (CORDARONE) 200 MG tablet Take 1 tablet by mouth daily 4/15/22  Yes Annalee Granda MD   ferrous sulfate (IRON 325) 325 (65 Fe) MG tablet Take 1 tablet by mouth daily (with breakfast) 3/31/22  Yes Mike Lester MD   apixaban (ELIQUIS) 2.5 MG TABS tablet Take 1 tablet by mouth 2 times daily 3/16/22  Yes Sanjana Boykin MD   allopurinol (ZYLOPRIM) 300 MG tablet Take 1 tablet by mouth daily Per rheumatologist 2/15/22  Yes Elizabeth Kaufman MD   atorvastatin (LIPITOR) 40 MG tablet Take 1 tablet by mouth once daily 2/15/22  Yes Elizabeth Kaufman MD   desloratadine (CLARINEX) 5 MG tablet Take 1 tablet by mouth once daily 2/15/22  Yes Elizabeth Kaufman MD   magnesium oxide (MAG-OX) 400 (241.3 Mg) MG TABS tablet Take 1 tablet by mouth twice daily 2/15/22  Yes Elizabeth Kaufman MD   metoprolol tartrate (LOPRESSOR) 50 MG tablet Take 1 tablet by mouth 2 times daily 2/15/22  Yes Elizabeth Kaufman MD   miconazole (MICOTIN) 2 % powder Apply topically 2 times daily UNDER THE SKIN FOLDS LONG TERM 2/15/22  Yes Elizabeth Kaufman MD   Multiple Vitamins-Minerals (THERAPEUTIC MULTIVITAMIN-MINERALS) tablet Take 1 tablet by mouth daily 2/15/22  Yes Elizabeth Kaufman MD   Blood Pressure KIT Diagnosis: HTN. Needs to check blood pressure 1-2 times a day until stable, then once a day. Goal blood pressure less than 135/85, and above 110/60. 5/26/22   Elizabeth Kaufman MD   saline nasal gel (AYR) GEL by Nasal route as needed for Congestion For nose bleeds, 2-3 times a day intranasal prn 4/28/22   Elizabeth Kaufman MD   Nebulizers (COMPRESSOR/NEBULIZER) MISC Nebulizer with compressor. Disposable Med Nebs 2 /month. Reusable Med Nebs 1 per 6 months. Aerosol Mask 1 per month. Replacement Filters 2 per month. All other related supplies as needed per month. Medications being used: Albuterol . 083 unit dose vial. Frequency: every 6 hrs x 4/day . Length of Need: 1 2/22/22   Mike Lester MD   albuterol (PROVENTIL) (2.5 MG/3ML) 0.083% nebulizer solution Take 3 mLs by nebulization every 6 hours as needed for Wheezing or Shortness of Breath 2/22/22   Mike Lester MD albuterol sulfate HFA (PROAIR HFA) 108 (90 Base) MCG/ACT inhaler Inhale 2 puffs into the lungs every 6 hours as needed for Wheezing or Shortness of Breath (cough) 2/22/22   Diana Quezada MD   Misc. Devices (ADJUST FOLD CANE/YORK HANDLE) MISC Use daily for walking 2/22/22   Diana Quezada MD   Handicap Placard MISC by Does not apply route Expires on 12/30/25 2/22/22   Diana Quezada MD   FreeStyle Lancets MISC 1 each by Does not apply route daily Patient needs to contact office before any further refills will be approved 2/15/22   Beronica Bass MD   blood glucose test strips (FREESTYLE LITE) strip 1 each by In Vitro route daily As needed.  3/19/21   Beronica Bass MD   Blood Pressure KIT 1 kit by Does not apply route three times daily 12/19/19   Artem Doty MD   blood glucose monitor kit and supplies 1 kit by Other route three times daily Dispense Butterfly Elite CBG Device 8/20/19   Karyle Spikes, MD       CURRENT MEDICATIONS:  Current Facility-Administered Medications   Medication Dose Route Frequency Provider Last Rate Last Admin    sodium chloride flush 0.9 % injection 5-40 mL  5-40 mL IntraVENous 2 times per day Quintella Govern, APRN - CNP        sodium chloride flush 0.9 % injection 5-40 mL  5-40 mL IntraVENous PRN Quintella Govern, APRN - CNP        0.9 % sodium chloride infusion   IntraVENous PRN Quintella Govern, APRN - CNP        ondansetron (ZOFRAN-ODT) disintegrating tablet 4 mg  4 mg Oral Q8H PRN Quintella Govern, APRN - CNP        Or    [facility]ron [facility]) injection 4 mg  4 mg IntraVENous Q6H PRN Quintella Govern, APRN - CNP        acetaminophen (TYLENOL) tablet 650 mg  650 mg Oral Q6H PRN Quintella Govern, APRN - CNP   650 mg at 06/08/22 1451    Or    acetaminophen (TYLENOL) suppository 650 mg  650 mg Rectal Q6H PRN Quintella Govern, APRN - CNP        albuterol (PROVENTIL) nebulizer solution 2.5 mg  2.5 mg Nebulization Q6H PRN Quintella Govern, APRN - CNP        albuterol sulfate HFA (PROVENTIL;VENTOLIN;PROAIR) 108 (90 Base) MCG/ACT inhaler 2 puff  2 puff Inhalation Q6H PRN Arlyne Elio, APRN - CNP        [Held by provider] allopurinol (ZYLOPRIM) tablet 300 mg  300 mg Oral Daily Arlyne Elio, APRN - CNP   300 mg at 06/08/22 4289    amiodarone (CORDARONE) tablet 200 mg  200 mg Oral Daily Arlyne Elio, APRN - CNP   200 mg at 06/08/22 8305    apixaban (ELIQUIS) tablet 2.5 mg  2.5 mg Oral BID Arlyne Elio, APRN - CNP   2.5 mg at 06/08/22 0844    atorvastatin (LIPITOR) tablet 40 mg  40 mg Oral Daily Arlyne Elio, APRN - CNP   40 mg at 06/08/22 0844    cetirizine (ZYRTEC) tablet 5 mg  5 mg Oral Daily Arlyne Elio, APRN - CNP   5 mg at 06/08/22 0844    ferrous sulfate (IRON 325) tablet 325 mg  325 mg Oral Daily with breakfast Arlyne Elio, APRN - CNP   325 mg at 06/08/22 0844    magnesium oxide (MAG-OX) tablet 400 mg  400 mg Oral BID Arlyne Elio, APRN - CNP   400 mg at 06/08/22 0844    miconazole (MICOTIN) 2 % powder   Topical BID Arlyne Elio, APRN - CNP   Given at 06/08/22 6928    therapeutic multivitamin-minerals 1 tablet  1 tablet Oral Daily Arlyne Elio, APRN - CNP   1 tablet at 06/08/22 9682    saline nasal gel (AYR)   Nasal PRN Arlyne Elio, APRN - CNP        metoprolol tartrate (LOPRESSOR) tablet 50 mg  50 mg Oral BID Arlyne Elio, APRN - CNP   50 mg at 06/08/22 0844    hydrALAZINE (APRESOLINE) injection 20 mg  20 mg IntraVENous Q6H PRN Herbert Chapman MD   20 mg at 06/08/22 1231    hydrALAZINE (APRESOLINE) tablet 50 mg  50 mg Oral TID Verta Holstein, MD        amLODIPine (NORVASC) tablet 5 mg  5 mg Oral Nightly Verta Holstein, MD        0.9 % sodium chloride infusion   IntraVENous Continuous Fan Weiner MD               Objective:      BP (!) 176/49   Pulse 60   Temp 98.2 °F (36.8 °C) (Oral)   Resp 16   SpO2 98%       LABS    CBC:   Lab Results   Component Value Date    WBC 8.4 06/08/2022    RBC 4.25 06/08/2022    HGB 12.8 06/08/2022     SED RATE:   Lab Results   Component Value Date    SEDRATE 7 10/17/2017       CMP:  Albumin:    Lab Results   Component Value Date    LABALBU 3.3 05/18/2022     PT/INR:    Lab Results   Component Value Date    PROTIME 16.0 02/06/2022    INR 1.3 02/06/2022     HgBA1c:    Lab Results   Component Value Date    LABA1C 4.6 03/31/2022     PTT: No components found for: LABPTT      Assessment:       Jackson Risk Score: Jackson Scale Score: 19    Patient Active Problem List   Diagnosis Code    Vitamin D deficiency E55.9    Venous insufficiency of both lower extremities I87.2    Asthma J45.909    Type 2 diabetes mellitus with stage 3 chronic kidney disease, without long-term current use of insulin ([facility]) E11.22, N18.30    Coronary artery disease involving native coronary artery of native heart without angina pectoris I25.10    Iron deficiency anemia D50.9    Stage 3 chronic kidney disease (HCC) N18.30    Colonoscopy refused Z53.20    Pneumococcal vaccination declined Z28.21    Impaired hearing H91.90    Chronic diastolic CHF (congestive heart failure) ([facility]) I50.32    COPD (chronic obstructive pulmonary disease) ([facility]) J44.9    HTN. Benign hypertension with CKD (chronic kidney disease) stage III ([facility]) I12.9, N18.30    Gout with tophi M1A. 9XX1    Hyperlipidemia with target LDL less than 70 E78.5    Dry skin dermatitis HANDS L85.3    Hypertensive emergency I16.1    Meningioma, cerebral (HCC) D32.0    Paroxysmal atrial fibrillation ([facility]) I48.0    Influenza vaccination declined Z28.21    Recurrent UTI N39.0    Elevated LFTs R79.89    Cellulitis of both lower extremities L03.115, L03. 116    Hypomagnesemia E83.42    Kidney stones N20.0    Diverticulosis K57.90    COVID-19 vaccination declined Z28.21    Acute kidney injury ([facility] [address] N17.9    Acute on chronic combined systolic (congestive) and diastolic (congestive) heart failure (HCC) I50.43    Chronic a-fib ([facility]) I48.20    Chronic venous stasis dermatitis of both lower extremities I87.2    Symptomatic anemia D64.9    Family history of colon cancer Z80.0    Family history of pancreatic cancer Z80.0    Mild malnutrition (HCC) E44.1    Decreased strength, endurance, and mobility R53.1, Z74.09, R68.89    Bilateral lower extremity edema R60.0    Non-pressure chronic ulcer of other part of left lower leg limited to breakdown of skin (HCC) L97.821    Bleeding nose R04.0    Unintended weight loss R63.4    Lymphedema I89.0    Peripheral vascular disease, unspecified (HCC) I73.9    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 ([address] L97.811    Acute kidney injury superimposed on chronic kidney disease (HCC) N17.9, N18.9    Anticoagulated Z79.01         Measurements:  Wound 05/20/22 Right Wound #1 right lower  leg cluster- converged with #2 (Active)   Wound Image    05/24/22 1520   Wound Etiology Venous 06/08/22 1405   Dressing Status Old drainage noted;New dressing applied 06/08/22 1405   Wound Cleansed Soap and water 06/08/22 1405   Dressing/Treatment Hydrofiber Ag;ABD;Other (comment) 06/08/22 1405   Wound Length (cm) 0.1 cm 06/08/22 1405   Wound Width (cm) 0.1 cm 06/08/22 1405   Wound Depth (cm) 0.1 cm 06/08/22 1405   Wound Surface Area (cm^2) 0.01 cm^2 06/08/22 1405   Change in Wound Size % (l*w) 99.93 06/08/22 1405   Wound Volume (cm^3) 0.001 cm^3 06/08/22 1405   Wound Healing % 100 06/08/22 1405   Post-Procedure Length (cm) 12 cm 06/01/22 1324   Post-Procedure Width (cm) 30 cm 06/01/22 1324   Post-Procedure Depth (cm) 0.1 cm 06/01/22 1324   Post-Procedure Surface Area (cm^2) 360 cm^2 06/01/22 1324   Post-Procedure Volume (cm^3) 36 cm^3 06/01/22 1324   Wound Assessment Epithelialization;Pink/red 06/08/22 1405   Drainage Amount None 06/08/22 1405   Drainage Description Serosanguinous; Serous; Yellow 06/01/22 1324   Odor None 06/08/22 1405   Marian-wound Assessment Blanchable erythema;Fragile 06/08/22 1405   Margins Attached edges 06/08/22 1405   Wound Thickness Description not for Pressure Injury Full thickness 06/01/22 1324   Number of days: 19       Wound 05/20/22 Left;Proximal Wound # 4 Left lower lat leg (Active)   Wound Image   05/24/22 1520   Wound Etiology Venous 06/08/22 1405   Dressing Status Old drainage noted;New dressing applied 06/08/22 1405   Wound Cleansed Soap and water 06/08/22 1405   Dressing/Treatment Hydrofiber Ag;ABD;Other (comment) 06/08/22 1405   Wound Length (cm) 0.1 cm 06/08/22 1405   Wound Width (cm) 0.1 cm 06/08/22 1405   Wound Depth (cm) 0.1 cm 06/08/22 1405   Wound Surface Area (cm^2) 0.01 cm^2 06/08/22 1405   Change in Wound Size % (l*w) 99.93 06/08/22 1405   Wound Volume (cm^3) 0.001 cm^3 06/08/22 1405   Wound Healing % 100 06/08/22 1405   Post-Procedure Length (cm) 13 cm 06/01/22 1324   Post-Procedure Width (cm) 11 cm 06/01/22 1324   Post-Procedure Depth (cm) 0 cm 06/01/22 1324   Post-Procedure Surface Area (cm^2) 143 cm^2 06/01/22 1324   Post-Procedure Volume (cm^3) 0 cm^3 06/01/22 1324   Wound Assessment Pink/red 06/08/22 1405   Drainage Amount Scant 06/08/22 1405   Drainage Description Serosanguinous 06/08/22 1405   Odor None 06/08/22 1405   Marian-wound Assessment Blanchable erythema;Fragile 06/08/22 1405   Margins Attached edges 06/08/22 1405   Wound Thickness Description not for Pressure Injury Full thickness 06/01/22 1324   Number of days: 19       WOUND DESCRIPTION:   [address] nurse consult for bilateral leg wounds, Unna boot change. Patient was admitted on 6/7 by nephrology for evaluation of renal failure. Patient was supposed to have an appointment this afternoon with the wound care center to have Unna boots removed and reapplied, however she is admitted. Discussed with Dr. Isaiah Magallon and farzana to remove Unna boots and reapply. Old Unna boots were removed. Patient still has a few small areas that are weeping, but no definite open wounds.   Significant improvement noted from prior photographs taken at wound care center. [employer] substituted for silvercel and new calamine Unna boots applied. Discussed venous disease and the need for daily compression at length with patient and her daughter. Patient has juxtalite wraps at home, but has not worn them yet. Patient will need to follow-up with Dr. Sami Ortega in the wound care center next week. Response to treatment:  Well tolerated by patient. Plan:     Plan of Care:     -Keep Unna boots dry and intact      -Circulation checks every shift      -Remind patient to elevate her legs as often as possible    -Follow-up with Dr. Sami Ortega in outpatient wound care center next week    -Turn every 2 hours    -Float heels off of bed with pillows under calves. If needed- use offloading boots.    -Sacral foam dressing to sacrococcygeal area. Apply skin barrier wipe to skin first to help adherence. Peel back dressing to inspect skin beneath qshift, re-secure. Change every 72 hours and prn wrinkles, soilage. Discontinue if repeatedly soiled by incontinence.     -Routine incontinence care with foaming cleanser and zinc oxide cream. Apply zinc oxide cream twice daily and prn incontinence. Use moisture wicking under pad (one layer only). -Waffle mattress overlay. Check inflation each shift by sliding hand under the air overlay. Feel for the patient's heaviest/ most dependant body part. Ideally 1/2 to 1\" of air will be between your hand and the patient's body. Add air prn. Specialty Bed Required : Yes   [] Low Air Loss   [x] Pressure Redistribution  [] Fluid Immersion  [] Bariatric  [] Total Pressure Relief  [] Other:     Current Diet: ADULT DIET; Regular; 4 carb choices (60 gm/meal);  Low Sodium (2 gm)  Dietician consult:  No    Discharge Plan:  Placement for patient upon discharge: home with support   Patient appropriate for Outpatient [address]: Yes     Patient/Caregiver Teaching:  Level of patientunderstanding able to:     [x] Indicates understanding       [] Needs reinforcement  [] Unsuccessful      [x] Verbal Understanding  [] Demonstrated understanding       [] No evidence of learning  [] Refused teaching         [] N/A       Electronically signed by Hernán Diaz RN on  6/8/2022 at 4:42 PM

## 2022-06-08 NOTE — PLAN OF CARE
Problem: Discharge Planning  Goal: Discharge to home or other facility with appropriate resources  6/8/2022 1502 by Sneha Martínez RN  Outcome: Progressing  Note: Plan is for patient to discharge back home. 6/8/2022 0344 by Luis Fernando Reyna RN  Outcome: Progressing     Problem: Pain  Goal: Verbalizes/displays adequate comfort level or baseline comfort level  6/8/2022 1502 by Sneha Martínez RN  Outcome: Progressing  6/8/2022 0344 by Luis Fernando Reyna RN  Outcome: Progressing     Problem: ABCDS Injury Assessment  Goal: Absence of physical injury  6/8/2022 1502 by Sneha Martínez RN  Outcome: Progressing  6/8/2022 0344 by Luis Fernando Reyna RN  Outcome: Progressing     Problem: Skin/Tissue Integrity  Goal: Absence of new skin breakdown  Description: 1. Monitor for areas of redness and/or skin breakdown  2. Assess vascular access sites hourly  3. Every 4-6 hours minimum:  Change oxygen saturation probe site  4. Every 4-6 hours:  If on nasal continuous positive airway pressure, respiratory therapy assess nares and determine need for appliance change or resting period. 6/8/2022 1502 by Sneha Martínez RN  Outcome: Progressing  6/8/2022 0344 by Luis Fernando Reyna RN  Outcome: Progressing     Problem: Safety - Adult  Goal: Free from fall injury  6/8/2022 1502 by Sneha Martínez RN  Outcome: Progressing  Note: Patient is alert and oriented and able to make needs known.   6/8/2022 0344 by Luis Fernando Reyna RN  Outcome: Progressing     Problem: Chronic Conditions and Co-morbidities  Goal: Patient's chronic conditions and co-morbidity symptoms are monitored and maintained or improved  Outcome: Progressing

## 2022-06-08 NOTE — ACP (ADVANCE CARE PLANNING)
Advance Care Planning     Advance Care Planning Activator (Inpatient)  Conversation Note      Date of ACP Conversation: 6/8/2022     Conversation Conducted with: Patient with Decision Making Capacity    ACP Activator: Jermaine Ramirez RN        Health Care Decision Maker:     Current Designated Health Care Decision Maker:     Click here to complete Healthcare Decision Makers including section of the Healthcare Decision Maker Relationship (ie \"Primary\")  Today we documented Decision Maker(s) consistent with Legal Next of Kin hierarchy. Care Preferences    Ventilation: \"If you were in your present state of health and suddenly became very ill and were unable to breathe on your own, what would your preference be about the use of a ventilator (breathing machine) if it were available to you? \"      Would the patient desire the use of ventilator (breathing machine)?: yes    \"If your health worsens and it becomes clear that your chance of recovery is unlikely, what would your preference be about the use of a ventilator (breathing machine) if it were available to you? \"     Would the patient desire the use of ventilator (breathing machine)?: Yes      Resuscitation  \"CPR works best to restart the heart when there is a sudden event, like a heart attack, in someone who is otherwise healthy. Unfortunately, CPR does not typically restart the heart for people who have serious health conditions or who are very sick. \"    \"In the event your heart stopped as a result of an underlying serious health condition, would you want attempts to be made to restart your heart (answer \"yes\" for attempt to resuscitate) or would you prefer a natural death (answer \"no\" for do not attempt to resuscitate)? \" yes       [x] Yes   [] No   Educated Patient / Brian Savannas regarding differences between Advance Directives and portable DNR orders.     Length of ACP Conversation in minutes:      Conversation Outcomes:  [x] ACP discussion completed  [] Existing [General Appearance - Alert] : alert [General Appearance - In No Acute Distress] : in no acute distress [Sclera] : the sclera and conjunctiva were normal [Examination Of The Oral Cavity] : the lips and gums were normal [Oropharynx] : the oropharynx was normal [Neck Appearance] : the appearance of the neck was normal [Neck Cervical Mass (___cm)] : no neck mass was observed [Jugular Venous Distention Increased] : there was no jugular-venous distention [Thyroid Diffuse Enlargement] : the thyroid was not enlarged [Respiration, Rhythm And Depth] : normal respiratory rhythm and effort [Exaggerated Use Of Accessory Muscles For Inspiration] : no accessory muscle use [Auscultation Breath Sounds / Voice Sounds] : lungs were clear to auscultation bilaterally [Heart Sounds] : normal S1 and S2 [Heart Sounds Gallop] : no gallops [Murmurs] : no murmurs [Heart Sounds Pericardial Friction Rub] : no pericardial rub [Edema] : there was no peripheral edema [Abdomen Soft] : soft [Abdomen Tenderness] : non-tender [] : no hepato-splenomegaly [Abdomen Mass (___ Cm)] : no abdominal mass palpated [Cervical Lymph Nodes Enlarged Posterior Bilaterally] : posterior cervical [Cervical Lymph Nodes Enlarged Anterior Bilaterally] : anterior cervical [Supraclavicular Lymph Nodes Enlarged Bilaterally] : supraclavicular [Axillary Lymph Nodes Enlarged Bilaterally] : axillary [Femoral Lymph Nodes Enlarged Bilaterally] : femoral [Inguinal Lymph Nodes Enlarged Bilaterally] : inguinal [No CVA Tenderness] : no ~M costovertebral angle tenderness [Abnormal Walk] : normal gait [FreeTextEntry1] : no tremor [Oriented To Time, Place, And Person] : oriented to person, place, and time [Impaired Insight] : insight and judgment were intact [Affect] : the affect was normal [Mood] : the mood was normal

## 2022-06-08 NOTE — CONSULTS
NEPHROLOGY CONSULT     Patient :  Alida Councilman; 66 y.o. MRN# [id number]  Location:  2057/2057-01  Attending:  Clem Aguilar MD  Admit Date:  6/7/2022   Hospital Day: 0      Reason for Consult: Acute kidney injury      Chief Complaint: Worsening kidney function, abnormal labs  History Obtained From: Patient, daughter, nursing staff, EMR    History of Present Illness:     This is a 66 y.o. female  with past medical history of coronary artery disease status post coronary artery stent placement in 2000, history of CABG in 2003, history of type 2 diabetes since 2000 with mild nonproliferative retinopathy, CHF, chronic kidney disease stage IIIb with baseline creatinine 1.4 to 1.7 mg/dL, patient serum creatinine has been trending up and peaked to 2.2 mg/dL with BUN of 100 and therefore patient was sent to the hospital for further evaluation and management  Diuretics were held and patient was started on IV fluids  Patient denies any significant complaint no chest pain shortness of breath patient does have peripheral edema  Patient has nephrotic range proteinuria UPC of 20 g most likely patient has nephrotic syndrome  Patient refused to have kidney biopsy, but suspected cause of nephrotic syndrome is diabetic nephropathy prior investigation showed YRIS ANCA negative Kappa lambda ratio were within normal limits  Blood pressure uncontrolled  2D echo 2/6/2022 showed EF of greater than 55% moderate left ventricular hypertrophy, mild aortic stenosis mild to moderate aortic insufficiency    Past Medical History:        Diagnosis Date    Acute CVA (cerebrovascular accident) ([address] 7/25/2020    Altered mental status 5/26/2021    Arthritis     Brain mass     Cellulitis and abscess     Cellulitis and abscess of unspecified site     Cellulitis of both lower extremities 5/26/2021    Cellulitis of left lower extremity 7/26/2020    Cellulitis of lower extremity 10/4/2020    [facility]congest[facility] heart failure) ([facility]     Chronic kidney disease, unspecified     Coronary atherosclerosis of native coronary artery     Essential hypertension 7/25/2020    Essential hypertension, malignant     Hallucinations 2/18/2021    Hard of hearing     Head contusion 9/9/2020    Hypokalemia 9/9/2020    Iron deficiency anemia, unspecified     Mening[facility] 2/25/2021    Myocardial infarction ([facility]     Other and unspecified hyperlipidemia     Pure hypercholesterolemia     Toxic metabolic encephalopathy 7/45/4262    Type II or unspecified type diabetes mellitus without mention of complication, not stated as uncontrolled     Unspecified asthma(493.90)     Unspecified venous (peripheral) insufficiency     Unspecified vitamin D deficiency     UTI (urinary tract infection) 2/18/2021       Past Surgical History:        Procedure Laterality Date    COLONOSCOPY N/A 3/15/2022    COLONOSCOPY DIAGNOSTIC performed by Gio Simon MD at [facility]      x 3, Dr. Daquan Acuna  3/7/2022         THORACENTESIS  3/10/2022         TONSILLECTOMY AND ADENOIDECTOMY      UPPER GASTROINTESTINAL ENDOSCOPY N/A 3/15/2022    EGD BIOPSY performed by Gio Simon MD at [address] [place] [address]       Current Medications:    sodium chloride flush 0.9 % injection 5-40 mL, 2 times per day  sodium chloride flush 0.9 % injection 5-40 mL, PRN  0.9 % sodium chloride infusion, PRN  ondansetron (ZOFRAN-ODT) disintegrating tablet 4 mg, Q8H PRN   Or  ondansetron (ZOFRAN) injection 4 mg, Q6H PRN  acetaminophen (TYLENOL) tablet 650 mg, Q6H PRN   Or  acetaminophen (TYLENOL) suppository 650 mg, Q6H PRN  albuterol (PROVENTIL) nebulizer solution 2.5 mg, Q6H PRN  albuterol sulfate HFA (PROVENTIL;VENTOLIN;PROAIR) 108 (90 Base) MCG/ACT inhaler 2 puff, Q6H PRN  [Held by provider] allopurinol (ZYLOPRIM) tablet 300 mg, Daily  amiodarone (CORDARONE) tablet 200 mg, Daily  apixaban (ELIQUIS) tablet 2.5 mg, BID  atorvastatin (LIPITOR) tablet 40 mg, Daily  cetirizine (ZYRTEC) tablet 5 mg, Daily  ferrous sulfate (IRON 325) tablet 325 mg, Daily with breakfast  magnesium oxide (MAG-OX) tablet 400 mg, BID  miconazole (MICOTIN) 2 % powder, BID  therapeutic multivitamin-minerals 1 tablet, Daily  saline nasal gel (AYR), PRN  metoprolol tartrate (LOPRESSOR) tablet 50 mg, BID  hydrALAZINE (APRESOLINE) tablet 25 mg, TID  hydrALAZINE (APRESOLINE) injection 20 mg, Q6H PRN        Allergies:  Latex, Aleve [naproxen sodium], Pioglitazone, Claritin [loratadine], Keflex [cephalexin], and Lisinopril    Social History:   Social History     Socioeconomic History    Marital status:      Spouse name: Not on file    Number of children: 10    Years of education: Not on file    Highest education level: Not on file   Occupational History    Not on file   Tobacco Use    Smoking status: Former Smoker     Packs/day: 0.25     Years: 10.00     Pack years: 2.50     Quit date: 2000     Years since quittin.4    Smokeless tobacco: Never Used   Vaping Use    Vaping Use: Never used   Substance and Sexual Activity    Alcohol use: Not Currently     Alcohol/week: 0.0 standard drinks    Drug use: No    Sexual activity: Yes     Partners: Female   Other Topics Concern    Not on file   Social History Narrative    Not on file     Social Determinants of Health     Financial Resource Strain: Low Risk     Difficulty of Paying Living Expenses: Not very hard   Food Insecurity: No Food Insecurity    Worried About Running Out of Food in the Last Year: Never true    Holden of Food in the Last Year: Never true   Transportation Needs:     Lack of Transportation (Medical): Not on file    Lack of Transportation (Non-Medical):  Not on file   Physical Activity:     Days of Exercise per Week: Not on file    Minutes of Exercise per Session: Not on file   Stress:     Feeling of Stress : Not on file   Social Connections:     Frequency of Communication with Friends and Family: Not on file    Frequency of Summary (Last 24 hours) at 6/8/2022 1624  Last data filed at 6/8/2022 0447  Gross per 24 hour   Intake --   Output 200 ml   Net -200 ml     No data found. Physical Exam:  GENERAL APPEARANCE: Alert and cooperative, and appears to be in no acute distress. HEAD: normocephalic  EYES:  EOMI. Not pale, anicteric   NOSE:  No nasal discharge. THROAT:  Oral cavity and pharynx normal. Moist  CARDIAC: Regular rhythm  LUNGS: Normal respiratory effort no accessory muscle use  NECK: Neck supple, non-tender     MUSKULOSKELETAL: Adequately aligned spine. No joint erythema or tenderness. EXTREMITIES++ edema. Peripheral pulses intact. NEURO: Nonfocal      Labs:   CBC:  Recent Labs     06/07/22  2150 06/08/22  1220   WBC 9.8 8.4   RBC 4.41 4.25   HGB 13.3 12.8   HCT 41.4 39.8   MCV 93.7 93.5   MCH 30.1 30.2   MCHC 32.1 32.3   RDW 16.9* 16.5*    269   MPV 7.5 7.5      BMP:   Recent Labs     06/06/22  1305 06/07/22  2145 06/08/22  1220    138 141   K 4.9 5.0 4.2    104 106   CO2 25 24 23   * 99* 83*   CREATININE 2.25* 1.89* 1.92*   GLUCOSE 108* 124* 113*   CALCIUM 8.7 8.9 8.4*      Phosphorus:  No results for input(s): PHOS in the last 72 hours. Magnesium:   Recent Labs     06/07/22  2145   MG 2.4       Urine Sodium:    Recent Labs     06/07/22  2243   SLOAN <20      Urine Potassium: No results for input(s): KUR in the last 72 hours. Urine Chloride:  No results for input(s): CLU in the last 72 hours. Urine Ph:  Invalid input(s): PO4U  Urine Osmolarity: No results for input(s): OSMOU in the last 72 hours. Urine Creatinine:    Recent Labs     06/07/22  2243   LABCREA 65.8     Urine Eosinophils: Invalid input(s): EOSU  Urine Protein:  No results for input(s): TPU in the last 72 hours.   Urinalysis:    Recent Labs     06/06/22  1305   NITRU NEGATIVE   COLORU Yellow   PHUR 6.0   WBCUA 6 TO 9   RBCUA TOO NUMEROUS TO COUNT   BACTERIA None   SPECGRAV 1.019   LEUKOCYTESUR NEGATIVE   UROBILINOGEN Normal [address]         Radiology:  Reviewed as available. Assessment:  1. Acute kidney injury on CKD, most likely secondary to prerenal azotemia due to diuretic use and third spacing, due to hypoalbuminemia secondary to nephrotic syndrome  2. Peripheral edema nephrotic syndrome hypoalbuminemia  3. CKD stage IIIb Baseline creatinine 1.4 to 1.8 mg/dL, with nephrotic syndrome and nephrotic range proteinuria UPC 20 g, most likely secondary to diabetic nephropathy  4. CHF with preserved EF mild LVH diastolic dysfunction  5. Hypertension suboptimally controlled       Plan:  1. Hold diuretics  2. Gentle hydration IV fluid trial  3. Increase hydralazine 50 mg 3 times daily, metoprolol 50 mg twice a day  4. And amlodipine 5 mg nightly  5. IV hydralazine as needed    If kidney function does not improve or continues to get worse or patient get into fluid overload patient might need dialysis      Thank you for the consultation.       Electronically signed by Annamaria Turcios MD on 6/8/2022 at 4:24 PM

## 2022-06-08 NOTE — PLAN OF CARE
consistently high she will need to be started on a Cardene drip. -CBC BMP pending from this morning.   Repeat chest x-ray in the morning  -FOllow-up on BNP, urine protein creatinine ratio  -As per the patient he is not allergic to lisinopril, given her proteinuria and hypertension we can restart it on discharge  -Patient would benefit from 1 dose of IV Lasix      Alejandro Hamilton MD  PGY-3 Internal Medicine Resident  [address] [place] [address]  6/8/2022 12:03 PM

## 2022-06-08 NOTE — ED NOTES
The following labs labeled with pt sticker and tubed to lab:     [] Blue     [x] Lavender   [] on ice  [x] Green/yellow  [] Green/black [] on ice  [] Yellow  [] Red     Igor Bass RN  06/07/22 3018

## 2022-06-08 NOTE — ED NOTES
The following labs labeled with pt sticker and tubed to lab:     [] Blue     [] Lavender   [] on ice  [x] Green/yellow  [] Green/black [] on ice  [] Yellow  [] Red             Davina Harris RN  06/07/22 6849

## 2022-06-08 NOTE — FLOWSHEET NOTE
SC visit with patient and her oldest daughter; medical update provided; welcomed prayer     06/08/22 1606   Encounter Summary   Encounter Overview/Reason  Spiritual/Emotional Needs   Service Provided For: Patient and family together   Referral/Consult From: [address] [place] [address]   Last Encounter  06/08/22   Complexity of Encounter Moderate   Spiritual/Emotional needs   Type Spiritual Support   Assessment/Intervention/Outcome   Assessment Coping; Hopeful;Powerlessness   Intervention Discussed illness injury and its impact; Explored/Affirmed feelings, thoughts, concerns;Prayer (assurance of)/[place];Sustaining Presence/Ministry of presence   Outcome Coping;Engaged in conversation;Expressed feelings, needs, and concerns;Expressed Gratitude;Receptive

## 2022-06-08 NOTE — ED NOTES
Report given to Bhakti Arriaga RN from [employer]. Report method by phone   The following was reviewed with receiving RN:   Current vital signs:  BP (!) 193/44   Pulse 58   Temp 98.3 °F (36.8 °C)   Resp 18   SpO2 100%                MEWS Score: 1     Any medication or safety alerts were reviewed. Any pending diagnostics and notifications were also reviewed, as well as any safety concerns or issues, abnormal labs, abnormal imaging, and abnormal assessment findings. Questions were answered.             George Rivera RN  06/08/22 9363

## 2022-06-09 PROBLEM — I16.0 HYPERTENSIVE URGENCY: Status: ACTIVE | Noted: 2022-06-09

## 2022-06-09 LAB
ABSOLUTE EOS #: 0.2 K/UL (ref 0–0.4)
ABSOLUTE LYMPH #: 1.1 K/UL (ref 1–4.8)
ABSOLUTE MONO #: 0.6 K/UL (ref 0.1–1.3)
ANION GAP SERPL CALCULATED.3IONS-SCNC: 7 MMOL/L (ref 9–17)
BASOPHILS # BLD: 0 % (ref 0–2)
BASOPHILS ABSOLUTE: 0 K/UL (ref 0–0.2)
BUN BLDV-MCNC: 84 MG/DL (ref 8–23)
CALCIUM SERPL-MCNC: 8.5 MG/DL (ref 8.6–10.4)
CHLORIDE BLD-SCNC: 107 MMOL/L (ref 98–107)
CO2: 26 MMOL/L (ref 20–31)
CREAT SERPL-MCNC: 1.81 MG/DL (ref 0.5–0.9)
EOSINOPHILS RELATIVE PERCENT: 2 % (ref 0–4)
GFR AFRICAN AMERICAN: 33 ML/MIN
GFR NON-AFRICAN AMERICAN: 27 ML/MIN
GFR SERPL CREATININE-BSD FRML MDRD: ABNORMAL ML/MIN/{1.73_M2}
GLUCOSE BLD-MCNC: 112 MG/DL (ref 70–99)
HCT VFR BLD CALC: 38.3 % (ref 36–46)
HEMOGLOBIN: 12.5 G/DL (ref 12–16)
INR BLD: 1.1
LYMPHOCYTES # BLD: 14 % (ref 24–44)
MAGNESIUM: 2.3 MG/DL (ref 1.6–2.6)
MCH RBC QN AUTO: 30.9 PG (ref 26–34)
MCHC RBC AUTO-ENTMCNC: 32.6 G/DL (ref 31–37)
MCV RBC AUTO: 95 FL (ref 80–100)
MONOCYTES # BLD: 8 % (ref 1–7)
PDW BLD-RTO: 16.8 % (ref 11.5–14.9)
PLATELET # BLD: 256 K/UL (ref 150–450)
PMV BLD AUTO: 7.5 FL (ref 6–12)
POTASSIUM SERPL-SCNC: 4.4 MMOL/L (ref 3.7–5.3)
PROTHROMBIN TIME: 13.9 SEC (ref 11.8–14.6)
RBC # BLD: 4.04 M/UL (ref 4–5.2)
SEG NEUTROPHILS: 76 % (ref 36–66)
SEGMENTED NEUTROPHILS ABSOLUTE COUNT: 6.1 K/UL (ref 1.3–9.1)
SODIUM BLD-SCNC: 140 MMOL/L (ref 135–144)
WBC # BLD: 8.1 K/UL (ref 3.5–11)

## 2022-06-09 PROCEDURE — 36415 COLL VENOUS BLD VENIPUNCTURE: CPT

## 2022-06-09 PROCEDURE — 6370000000 HC RX 637 (ALT 250 FOR IP): Performed by: INTERNAL MEDICINE

## 2022-06-09 PROCEDURE — 83735 ASSAY OF MAGNESIUM: CPT

## 2022-06-09 PROCEDURE — 85610 PROTHROMBIN TIME: CPT

## 2022-06-09 PROCEDURE — 99225 PR SBSQ OBSERVATION CARE/DAY 25 MINUTES: CPT | Performed by: INTERNAL MEDICINE

## 2022-06-09 PROCEDURE — G0378 HOSPITAL OBSERVATION PER HR: HCPCS

## 2022-06-09 PROCEDURE — 97162 PT EVAL MOD COMPLEX 30 MIN: CPT

## 2022-06-09 PROCEDURE — 2580000003 HC RX 258: Performed by: INTERNAL MEDICINE

## 2022-06-09 PROCEDURE — 96376 TX/PRO/DX INJ SAME DRUG ADON: CPT

## 2022-06-09 PROCEDURE — 80048 BASIC METABOLIC PNL TOTAL CA: CPT

## 2022-06-09 PROCEDURE — 97530 THERAPEUTIC ACTIVITIES: CPT

## 2022-06-09 PROCEDURE — 6370000000 HC RX 637 (ALT 250 FOR IP): Performed by: NURSE PRACTITIONER

## 2022-06-09 PROCEDURE — 85025 COMPLETE CBC W/AUTO DIFF WBC: CPT

## 2022-06-09 PROCEDURE — 6360000002 HC RX W HCPCS: Performed by: STUDENT IN AN ORGANIZED HEALTH CARE EDUCATION/TRAINING PROGRAM

## 2022-06-09 PROCEDURE — 97166 OT EVAL MOD COMPLEX 45 MIN: CPT

## 2022-06-09 RX ORDER — AMLODIPINE BESYLATE 5 MG/1
5 TABLET ORAL NIGHTLY
Qty: 30 TABLET | Refills: 3 | Status: SHIPPED | OUTPATIENT
Start: 2022-06-09 | End: 2022-06-11 | Stop reason: HOSPADM

## 2022-06-09 RX ORDER — HYDRALAZINE HYDROCHLORIDE 50 MG/1
50 TABLET, FILM COATED ORAL 3 TIMES DAILY
Qty: 90 TABLET | Refills: 3 | Status: SHIPPED | OUTPATIENT
Start: 2022-06-09 | End: 2022-06-11 | Stop reason: HOSPADM

## 2022-06-09 RX ADMIN — HYDRALAZINE HYDROCHLORIDE 50 MG: 50 TABLET, FILM COATED ORAL at 08:05

## 2022-06-09 RX ADMIN — CETIRIZINE HYDROCHLORIDE 5 MG: 5 TABLET, FILM COATED ORAL at 08:04

## 2022-06-09 RX ADMIN — ACETAMINOPHEN 650 MG: 325 TABLET ORAL at 08:05

## 2022-06-09 RX ADMIN — HYDRALAZINE HYDROCHLORIDE 20 MG: 20 INJECTION INTRAMUSCULAR; INTRAVENOUS at 06:23

## 2022-06-09 RX ADMIN — Medication 400 MG: at 20:29

## 2022-06-09 RX ADMIN — METOPROLOL TARTRATE 50 MG: 50 TABLET, FILM COATED ORAL at 08:04

## 2022-06-09 RX ADMIN — HYDRALAZINE HYDROCHLORIDE 50 MG: 50 TABLET, FILM COATED ORAL at 20:29

## 2022-06-09 RX ADMIN — ANTI-FUNGAL POWDER MICONAZOLE NITRATE TALC FREE: 1.42 POWDER TOPICAL at 08:08

## 2022-06-09 RX ADMIN — APIXABAN 2.5 MG: 2.5 TABLET, FILM COATED ORAL at 20:29

## 2022-06-09 RX ADMIN — FERROUS SULFATE TAB 325 MG (65 MG ELEMENTAL FE) 325 MG: 325 (65 FE) TAB at 08:04

## 2022-06-09 RX ADMIN — SODIUM CHLORIDE: 9 INJECTION, SOLUTION INTRAVENOUS at 05:41

## 2022-06-09 RX ADMIN — ACETAMINOPHEN 650 MG: 325 TABLET ORAL at 17:47

## 2022-06-09 RX ADMIN — APIXABAN 2.5 MG: 2.5 TABLET, FILM COATED ORAL at 08:04

## 2022-06-09 RX ADMIN — Medication 400 MG: at 08:04

## 2022-06-09 RX ADMIN — AMLODIPINE BESYLATE 5 MG: 5 TABLET ORAL at 20:29

## 2022-06-09 RX ADMIN — MULTIPLE VITAMINS W/ MINERALS TAB 1 TABLET: TAB at 08:04

## 2022-06-09 RX ADMIN — ATORVASTATIN CALCIUM 40 MG: 40 TABLET, FILM COATED ORAL at 08:04

## 2022-06-09 RX ADMIN — ANTI-FUNGAL POWDER MICONAZOLE NITRATE TALC FREE: 1.42 POWDER TOPICAL at 20:30

## 2022-06-09 RX ADMIN — HYDRALAZINE HYDROCHLORIDE 50 MG: 50 TABLET, FILM COATED ORAL at 13:21

## 2022-06-09 RX ADMIN — AMIODARONE HYDROCHLORIDE 200 MG: 200 TABLET ORAL at 08:04

## 2022-06-09 RX ADMIN — METOPROLOL TARTRATE 50 MG: 50 TABLET, FILM COATED ORAL at 20:29

## 2022-06-09 RX ADMIN — SODIUM CHLORIDE: 9 INJECTION, SOLUTION INTRAVENOUS at 23:20

## 2022-06-09 ASSESSMENT — PAIN DESCRIPTION - LOCATION: LOCATION: HEAD

## 2022-06-09 ASSESSMENT — PAIN DESCRIPTION - DESCRIPTORS: DESCRIPTORS: DISCOMFORT;ACHING

## 2022-06-09 ASSESSMENT — PAIN SCALES - GENERAL
PAINLEVEL_OUTOF10: 3
PAINLEVEL_OUTOF10: 5

## 2022-06-09 ASSESSMENT — PAIN DESCRIPTION - FREQUENCY: FREQUENCY: INTERMITTENT

## 2022-06-09 ASSESSMENT — PAIN - FUNCTIONAL ASSESSMENT: PAIN_FUNCTIONAL_ASSESSMENT: ACTIVITIES ARE NOT PREVENTED

## 2022-06-09 ASSESSMENT — PAIN DESCRIPTION - PAIN TYPE: TYPE: ACUTE PAIN

## 2022-06-09 NOTE — PROGRESS NOTES
NEPHROLOGY progress NOTE    Patient :  Drea Dears; 66 y.o. MRN# [id number]  Location:  2057/2057-01  Attending:  David Wiggins MD  Admit Date:  6/7/2022   Hospital Day: 1      Reason for Consult: Acute kidney injury      Chief Complaint: Worsening kidney function, abnormal labs    Subjective/interval history:  Patient seen and examined denies any new complaints no acute events overnight. BUN/creatinine slightly improved creatinine is 1.8 mg/dL BUN improved to 84  Blood pressure suboptimally controlled    History of Present Illness:     This is a 66 y.o. female  with past medical history of coronary artery disease status post coronary artery stent placement in 2000, history of CABG in 2003, history of type 2 diabetes since 2000 with mild nonproliferative retinopathy, CHF, chronic kidney disease stage IIIb with baseline creatinine 1.4 to 1.7 mg/dL, patient serum creatinine has been trending up and peaked to 2.2 mg/dL with BUN of 100 and therefore patient was sent to the hospital for further evaluation and management  Diuretics were held and patient was started on IV fluids  Patient denies any significant complaint no chest pain shortness of breath patient does have peripheral edema  Patient has nephrotic range proteinuria UPC of 20 g most likely patient has nephrotic syndrome  Patient refused to have kidney biopsy, but suspected cause of nephrotic syndrome is diabetic nephropathy prior investigation showed YRIS ANCA negative Kappa lambda ratio were within normal limits  Blood pressure uncontrolled  2D echo 2/6/2022 showed EF of greater than 55% moderate left ventricular hypertrophy, mild aortic stenosis mild to moderate aortic insufficiency    Past Medical History:        Diagnosis Date    Acute [facility]A (cerebrovascular accident) ([facility] 7/25/2020    Altered mental status 5/26/2021    Arthritis     Brain mass     Cellulitis and abscess     Cellulitis and abscess of unspecified site     Cellulitis of both lower extremities 5/26/2021    Cellulitis of left lower extremity 7/26/2020    Cellulitis of lower extremity 10/4/2020    CHF (congestive heart failure) (HCC)     Chronic kidney disease, unspecified     Coronary atherosclerosis of native coronary artery     Essential hypertension 7/25/2020    Essential hypertension, malignant     Hallucinations 2/18/2021    Hard of hearing     Head contusion 9/9/2020    Hypokalemia 9/9/2020    Iron deficiency anemia, unspecified     Mening[facility]oma ([facility] 2/25/2021    [facility]     Other and unspecified hyperlipidemia     Pure hypercholesterolemia     Toxic metabolic encephalopathy 4/68/5811    Type II or unspecified type diabetes mellitus without mention of complication, not stated as uncontrolled     Unspecified asthma(493.90)     Unspecified venous (peripheral) insufficiency     Unspecified vitamin D deficiency     UTI (urinary tract infection) 2/18/2021       Past Surgical History:        Procedure Laterality Date    COLONOSCOPY N/A 3/15/2022    COLONOSCOPY DIAGNOSTIC performed by Scotty Lynn MD at [facility].      x 3, Dr. Bartolome Alejandro  3/7/2022         THORACENTESIS  3/10/2022         TONSILLECTOMY AND ADENOIDECTOMY      UPPER GASTROINTESTINAL ENDOSCOPY N/A 3/15/2022    EGD BIOPSY [facility]ty [facility]       Current Medications:    sodium chloride flush 0.9 % injection 5-40 mL, 2 times per day  sodium chloride flush 0.9 % injection 5-40 mL, PRN  0.9 % sodium chloride infusion, PRN  ondansetron (ZOFRAN-ODT) disintegrating tablet 4 mg, Q8H PRN   Or  ondansetron (ZOFRAN) injection 4 mg, Q6H PRN  acetaminophen (TYLENOL) tablet 650 mg, Q6H PRN   Or  acetaminophen (TYLENOL) suppository 650 mg, Q6H PRN  albuterol (PROVENTIL) nebulizer solution 2.5 mg, Q6H PRN  albuterol sulfate HFA (PROVENTIL;VENTOLIN;PROAIR) 108 (90 Base) MCG/ACT inhaler 2 puff, Q6H PRN  [Held by provider] allopurinol (ZYLOPRIM) tablet 300 mg, Daily  amiodarone (CORDARONE) tablet 200 mg, Daily  apixaban (ELIQUIS) tablet 2.5 mg, BID  atorvastatin (LIPITOR) tablet 40 mg, Daily  cetirizine (ZYRTEC) tablet 5 mg, Daily  ferrous sulfate (IRON 325) tablet 325 mg, Daily with breakfast  magnesium oxide (MAG-OX) tablet 400 mg, BID  miconazole (MICOTIN) 2 % powder, BID  therapeutic multivitamin-minerals 1 tablet, Daily  saline nasal gel (AYR), PRN  metoprolol tartrate (LOPRESSOR) tablet 50 mg, BID  hydrALAZINE (APRESOLINE) injection 20 mg, Q6H PRN  hydrALAZINE (APRESOLINE) tablet 50 mg, TID  amLODIPine (NORVASC) tablet 5 mg, Nightly  0.9 % sodium chloride infusion, Continuous        Allergies:  Latex, Aleve [naproxen sodium], Pioglitazone, Claritin [loratadine], Keflex [cephalexin], and Lisinopril    Social History:   Social History     Socioeconomic History    Marital status:      Spouse name: Not on file    Number of children: 10    Years of education: Not on file    Highest education level: Not on file   Occupational History    Not on file   Tobacco Use    Smoking status: Former Smoker     Packs/day: 0.25     Years: 10.00     Pack years: 2.50     Quit date: 2000     Years since quittin.4    Smokeless tobacco: Never Used   Vaping Use    Vaping Use: Never used   Substance and Sexual Activity    Alcohol use: Not Currently     Alcohol/week: 0.0 standard drinks    Drug use: No    Sexual activity: Yes     Partners: Female   Other Topics Concern    Not on file   Social History Narrative    Not on file     Social Determinants of Health     Financial Resource Strain: Low Risk     Difficulty of Paying Living Expenses: Not very hard   Food Insecurity: No Food Insecurity    Worried About Running Out of Food in the Last Year: Never true    Holden of Food in the Last Year: Never true   Transportation Needs:     Lack of Transportation (Medical): Not on file    Lack of Transportation (Non-Medical):  Not on file Physical Activity:     Days of Exercise per Week: Not on file    Minutes of Exercise per Session: Not on file   Stress:     Feeling of Stress : Not on file   Social Connections:     Frequency of Communication with Friends and Family: Not on file    Frequency of Social Gatherings with Friends and Family: Not on file    Attends Congregation Services: Not on file    Active Member of [address] [place] [address] or Organizations: Not on file    Attends Club or Organization Meetings: Not on file    Marital Status: Not on file   Intimate Partner Violence:     Fear of Current or Ex-Partner: Not on file    Emotionally Abused: Not on file    Physically Abused: Not on file    Sexually Abused: Not on file   Housing Stability:     Unable to Pay for Housing in the Last Year: Not on file    Number of [place] in the Last Year: Not on file    Unstable Housing in the Last Year: Not on file             Objective:  CURRENT TEMPERATURE:  Temp: 97.3 °F (36.3 °C)  MAXIMUM TEMPERATURE OVER 24HRS:  Temp (24hrs), Av.6 °F (36.4 °C), Min:97.2 °F (36.2 °C), Max:98 °F (36.7 °C)    CURRENT RESPIRATORY RATE:  Resp: 14  CURRENT PULSE:  Heart Rate: 59  CURRENT BLOOD PRESSURE:  BP: (!) 147/52  24HR BLOOD PRESSURE RANGE:  Systolic (94ZUQ), LKS:849 , Min:137 , XHP:126   ; Diastolic (67VGO), TCP:66, Min:45, Max:72    24HR INTAKE/OUTPUT:      Intake/Output Summary (Last 24 hours) at 2022 1459  Last data filed at 2022 0519  Gross per 24 hour   Intake 596.57 ml   Output 550 ml   Net 46.57 ml     Patient Vitals for the past 96 hrs (Last 3 readings):   Weight   22 0516 156 lb 4.9 oz (70.9 kg)       Physical Exam:  GENERAL APPEARANCE: Alert and cooperative, and appears to be in no acute distress. HEAD: normocephalic  EYES:  EOMI. Not pale, anicteric   NOSE:  No nasal discharge.     THROAT:  Oral cavity and pharynx normal. Moist  CARDIAC: Regular rhythm  LUNGS: Normal respiratory effort no accessory muscle use  NECK: Neck supple, non-tender MUSKULOSKELETAL: Adequately aligned spine. No joint erythema or tenderness. EXTREMITIES++ edema. Peripheral pulses intact. NEURO: Nonfocal      Labs:   CBC:  Recent Labs     06/07/22 2150 06/08/22  1220 06/09/22  0600   WBC 9.8 8.4 8.1   RBC 4.41 4.25 4.04   HGB 13.3 12.8 12.5   HCT 41.4 39.8 38.3   MCV 93.7 93.5 95.0   MCH 30.1 30.2 30.9   MCHC 32.1 32.3 32.6   RDW 16.9* 16.5* 16.8*    269 256   MPV 7.5 7.5 7.5      BMP:   Recent Labs     06/07/22 2145 06/08/22  1220 06/09/22  0600    141 140   K 5.0 4.2 4.4    106 107   CO2 24 23 26   BUN 99* 83* 84*   CREATININE 1.89* 1.92* 1.81*   GLUCOSE 124* 113* 112*   CALCIUM 8.9 8.4* 8.5*      Phosphorus:  No results for input(s): PHOS in the last 72 hours. Magnesium:   Recent Labs     06/07/22 2145 06/09/22  0600   MG 2.4 2.3       Urine Sodium:    Recent Labs     06/08/22 2152   LSOAN <20      Urine Potassium: No results for input(s): KUR in the last 72 hours. Urine Chloride:  No results for input(s): CLU in the last 72 hours. Urine Ph:  Invalid input(s): PO4U  Urine Osmolarity: No results for input(s): OSMOU in the last 72 hours. Urine Creatinine:    Recent Labs     06/08/22 2152   LABCREA 63.0     Urine Eosinophils: Invalid input(s): EOSU  Urine Protein:  No results for input(s): TPU in the last 72 hours. Urinalysis:    Recent Labs     06/08/22 2152   NITRU POSITIVE*   COLORU Yellow   PHUR 7.5   WBCUA 3 to 5   RBCUA 0 TO 2   BACTERIA MANY*   SPECGRAV 1.022   LEUKOCYTESUR SMALL*   UROBILINOGEN Normal   BILIRUBINUR NEGATIVE   GLUCOSEU TRACE*   [address]         Radiology:  Reviewed as available. Assessment:  1. Acute kidney injury on CKD, most likely secondary to prerenal azotemia due to diuretic use and third spacing, due to hypoalbuminemia secondary to nephrotic syndrome, serum creatinine improved to 1.8 mg/dL which is near baseline  2. Peripheral edema nephrotic syndrome hypoalbuminemia  3.  CKD stage IIIb Baseline creatinine 1.4 to 1.8 mg/dL, with nephrotic syndrome and nephrotic range proteinuria UPC 20 g, most likely secondary to diabetic nephropathy  4. CHF with preserved EF mild LVH diastolic dysfunction  5. Hypertension suboptimally controlled       Plan:  1. Hold diuretics  2. Taper IV fluids  3.  continue hydralazine 50 mg 3 times daily, metoprolol 50 mg twice a day  4. Increase amlodipine 10 mg daily  5. IV hydralazine as needed      Discharge planning in progress, adjusting blood pressure medications, once blood pressure is better controlled patient will be discharged most likely tomorrow, follow-up outpatient in 3 weeks BMP every week after discharge      Thank you for the consultation.       Electronically signed by Aureliano Colorado MD on 6/9/2022 at 2:59 PM

## 2022-06-09 NOTE — DISCHARGE SUMMARY
[place] [address] Internal Medicine    Discharge Summary     Patient ID: Artemio Hernandez  :     MRN: [id number]     ACCOUNT:  [de-identified]   Patient's PCP: Jennifer Juarez MD  Admit Date: 2022   Discharge Date: 2022    Length of Stay: 0  Code Status:  Full Code  Admitting Physician: Dedrick King MD  Discharge Physician: Ty Horne MD     Active Discharge Diagnoses:     Primary Problem  Acute kidney injury superimposed on chronic kidney disease [facility] Problems    Diagnosis Date Noted    Hypertensive urgency with joy [I16.0] 2022     Priority: Medium    Acute kidney injury superimposed on chronic kidney disease ([address] [N17.9, N18.9] 2022     Priority: Medium    Anticoagulated [Z79.01] 2022     Priority: Medium    Bilateral lower extremity edema [R60.0] 2022     Priority: Medium    Chronic a-fib ([address] [I48.20] 2022    JOY (acute kidney injury) ([address] [N17.9] 01/15/2022    COPD (chronic obstructive pulmonary disease) ([address] [J44.9] 2020    Partial deafness of both ears [H91.93] 2020    Stage 3 chronic kidney disease ([address] [N18.30]     Type 2 diabetes mellitus with stage 3 chronic kidney disease, without long-term current use of insulin ([address] [E11.22, N18.30]     Venous insufficiency of both lower extremities [I87.2]        Admission Condition:  fair     Discharged Condition: fair    Hospital Stay:     Hospital Course:  Artemio Hernandez is a 66 y.o. female who was admitted for the management of Acute kidney injury superimposed on chronic kidney disease ([address] , presented to ER with Abnormal Lab     Principal Problem:    Acute kidney injury superimposed on chronic kidney disease ([address]  Active Problems:    Bilateral lower extremity edema    Anticoagulated    Hypertensive urgency with joy    Venous insufficiency of both lower extremities    Type 2 diabetes mellitus with stage 3 chronic kidney disease, without long-term current use of insulin (HCC)    Stage 3 chronic kidney disease (HCC)    Partial deafness of both ears    COPD (chronic obstructive pulmonary disease) (HCC)    Hypertensive emergency    Chronic a-fib ([facility]  Resolved Problems:    * No resolved hospital problems. *         BP improved . Not in CHF   nyla on ckd , proteinuria   Creatinine mildly improved but elevated.     BMP:   Recent Labs     06/08/22  1220 06/09/22  0600    140   K 4.2 4.4   CO2 23 26   BUN 83* 84*   CREATININE 1.92* 1.81*   LABGLOM 26* 27*   GLUCOSE 113* 112*                       nephroloy input noted  Assessment:  1. Acute kidney injury on CKD, most likely secondary to prerenal azotemia due to diuretic use and third spacing, due to hypoalbuminemia secondary to nephrotic syndrome  2. Peripheral edema nephrotic syndrome hypoalbuminemia  3. CKD stage IIIb Baseline creatinine 1.4 to 1.8 mg/dL, with nephrotic syndrome and nephrotic range proteinuria UPC 20 g, most likely secondary to diabetic nephropathy  4. CHF with preserved EF mild LVH diastolic dysfunction  5. Hypertension suboptimally controlled        Plan:  1. hydralazine increased to 75 mg 3 times daily, metoprolol 50 mg twice a day  2. Increase amlodipine 10 mg daily  3.  Resume Bumex 0.5 mg daily on discharge     On admission  The patient is a 71 y.o.  female, with a history of chronic combined CHF, asthma, cellulitis, paroxysmal A. fib, HTN, lymphedema, CKD stage III, and diabetes mellitus type II, who presents with normal lab.  According to patient, she had routine lab work completed earlier today and received a phone call from her nephrologist, Dr. Marlene Leblanc, instructing her to come to the ED due to elevated BUN and creatinine.  Symptoms are associated with edema in bilateral lower extremities, which are currently wrapped with Unna boots.  Patient denies further complaints and states she wants to be discharged home first thing in the morning.  Denies fever, chills, chest pain, cough, abdominal pain, nausea, vomiting, diarrhea, and urinary symptoms.  Denies changes in urination, states that she is not going any less than usual.  There are no aggravating or alleviating factors.  Patient's Bumex was recently discontinued and started on hydralazine; however, BUN and creatinine have increased despite discontinuation of diuretic.  Denies changes in p.o. intake.  Symptoms are reported as mild and unchanged from baseline.        Patient's blood pressure better controlled with medication adjustments. Creatinine still elevated at 1.83 may be that is her new baseline. Will discharge her and have her follow-up with nephrology in 1 week with repeat BMP in 3 days. Significant therapeutic interventions:     Significant Diagnostic Studies:   Labs / Micro:    Radiology:    XR CHEST (2 VW)    Result Date: 6/7/2022  EXAMINATION: TWO XRAY VIEWS OF THE CHEST 6/7/2022 9:38 pm COMPARISON: Chest 05/18/2022 HISTORY: ORDERING SYSTEM PROVIDED HISTORY: eval pulm edema TECHNOLOGIST PROVIDED HISTORY: eval pulm edema Reason for Exam: PT CO mild cough with SOB and fatigue X several days. FINDINGS: The cardiac silhouette is enlarged. Calcifications involving the aorta reflect atherosclerosis. The mediastinal and hilar silhouettes appear unremarkable. Chronic appearing coarse interstitial densities predominantly parahilar regions and lower lungs. Chronic appearing coarse interstitial densities predominate perihilar regions and lung bases, typical of sequela from smoking or other previous infectious/inflammatory process. No dense consolidation or pleural effusion evident. No pneumothorax is seen. No acute osseous abnormality is identified. Sequela from prior median sternotomy. Hyperkyphosis and barrel chest configuration. Bones appear osteoporotic. 1. No definite acute pulmonary disease.  2.  Pulmonary sequela typical of that seen with smoking, including possible COPD; correlate with clinical history. 3. Calcific atherosclerosis aorta. 4. Cardiomegaly. XR CHEST (2 VW)    Result Date: 5/18/2022  EXAMINATION: TWO XRAY VIEWS OF THE CHEST 5/18/2022 5:14 pm COMPARISON: 03/24/2022 HISTORY: ORDERING SYSTEM PROVIDED HISTORY: ams, chf TECHNOLOGIST PROVIDED HISTORY: ams, chf Reason for Exam: ams, chf FINDINGS: Cardiac size is stable. Stable opacity in the medial right base. The pulmonary vascularity is hazy and indistinct. No pneumothorax. Small bilateral pleural effusions. Prominent naima suggesting pulmonary arterial hypertension. Postsurgical changes overlying the mediastinum. Mild pulmonary vascular congestion Prominent naima suggesting pulmonary arterial hypertension. Consultations:    Consults:     Final Specialist Recommendations/Findings:   IP CONSULT TO NEPHROLOGY  IP CONSULT TO INTERNAL MEDICINE  IP CONSULT TO SPIRITUAL SERVICES      The patient was seen and examined on day of discharge and this discharge summary is in conjunction with any daily progress note from day of discharge. Discharge plan:     Disposition: Home    Physician Follow Up:     Vidal Velazquez MD  [address] [place] [address]  [phone]    In 3 days  [facility] [place] [facility]    [facility], MD  [address] [address]  [phone]    In 1 week  Hospital follow-up for JOY on CKD and uncontrolled hypertension.        Requiring Further Evaluation/Follow Up POST HOSPITALIZATION/Incidental Findings:    Diet: cardiac diet    Activity: As tolerated    Instructions to Patient:     Discharge Medications:      Medication List      START taking these medications    amLODIPine 10 MG tablet  Commonly known as: NORVASC  Take 1 tablet by mouth nightly        CHANGE how you take these medications    bumetanide 0.5 MG tablet  Commonly known as: BUMEX  Take 1 tablet by mouth daily  What changed:   · medication strength  · how much to take  · when to take this  · additional instructions     hydrALAZINE 25 MG tablet  Commonly known as: APRESOLINE  Take 3 tablets by mouth 3 times daily  What changed:   · medication strength  · how much to take        CONTINUE taking these medications    Adjust Fold Cane/York Handle Misc  Use daily for walking     * albuterol (2.5 MG/3ML) 0.083% nebulizer solution  Commonly known as: PROVENTIL  Take 3 mLs by nebulization every 6 hours as needed for Wheezing or Shortness of Breath     * albuterol sulfate  (90 Base) MCG/ACT inhaler  Commonly known as: ProAir HFA  Inhale 2 puffs into the lungs every 6 hours as needed for Wheezing or Shortness of Breath (cough)     amiodarone 200 MG tablet  Commonly known as: CORDARONE  Take 1 tablet by mouth daily     apixaban 2.5 MG Tabs tablet  Commonly known as: ELIQUIS  Take 1 tablet by mouth 2 times daily     atorvastatin 40 MG tablet  Commonly known as: LIPITOR  Take 1 tablet by mouth once daily     blood glucose monitor kit and supplies  1 kit by Other route three times daily Dispense Butterfly Elite CBG Device     * Blood Pressure Kit  1 kit by Does not apply route three times daily     * Blood Pressure Kit  Diagnosis: HTN. Needs to check blood pressure 1-2 times a day until stable, then once a day. Goal blood pressure less than 135/85, and above 110/60. Compressor/Nebulizer Misc  Nebulizer with compressor. Disposable Med Nebs 2 /month. Reusable Med Nebs 1 per 6 months. Aerosol Mask 1 per month. Replacement Filters 2 per month. All other related supplies as needed per month. Medications being used: Albuterol . 083 unit dose vial. Frequency: every 6 hrs x 4/day . Length of Need: 1     desloratadine 5 MG tablet  Commonly known as: CLARINEX  Take 1 tablet by mouth once daily     ferrous sulfate 325 (65 Fe) MG tablet  Commonly known as: IRON 325  Take 1 tablet by mouth daily (with breakfast)     FreeStyle Lancets Misc  1 each by Does not apply route daily Patient needs to contact office before any further refills will be approved     FREESTYLE LITE strip  Generic drug: blood glucose test strips  1 each by In Vitro route daily As needed. Handicap Placard Misc  by Does not apply route Expires on 12/30/25     magnesium oxide 400 (241.3 Mg) MG Tabs tablet  Commonly known as: MAG-OX  Take 1 tablet by mouth twice daily     metoprolol tartrate 50 MG tablet  Commonly known as: LOPRESSOR  Take 1 tablet by mouth 2 times daily     miconazole 2 % powder  Commonly known as: MICOTIN  Apply topically 2 times daily UNDER THE SKIN FOLDS LONG TERM     saline nasal gel Gel  by Nasal route as needed for Congestion For nose bleeds, 2-3 times a day intranasal prn     therapeutic multivitamin-minerals tablet  Take 1 tablet by mouth daily     vitamin D 1.25 MG (38274 UT) Caps capsule  Commonly known as: ERGOCALCIFEROL  Take 1 capsule by mouth once a week Sundays         * This list has 4 medication(s) that are the same as other medications prescribed for you. Read the directions carefully, and ask your doctor or other care provider to review them with you. STOP taking these medications    allopurinol 300 MG tablet  Commonly known as: ZYLOPRIM           Where to Get Your Medications      These medications were sent to [place] [address]    Phone: [phone]   · amLODIPine 10 MG tablet  · bumetanide 0.5 MG tablet  · hydrALAZINE 25 MG tablet         Time Spent on discharge is  35 mins in patient examination, evaluation, counseling as well as medication reconciliation, prescriptions for required medications, discharge plan and follow up.     Regino Duncan MD  PGY-3 Internal Medicine Resident  [address] [place] [address]  6/11/2022 10:21 AM    Attestation and add on       I have discussed the care of Ignacio Bernal , including pertinent history and exam findings,      6/11/22    with the resident. I have seen and examined the patient and the key elements of all parts of the encounter have been performed by me . I agree with the assessment, plan and orders as documented by the resident. Hospital Problems           Last Modified POA    * (Principal) Acute kidney injury superimposed on chronic kidney disease ([address] 6/8/2022 Yes    Bilateral lower extremity edema 6/8/2022 Yes    Anticoagulated 6/8/2022 Yes    Hypertensive urgency with joy 6/9/2022 Yes    Venous insufficiency of both lower extremities 6/8/2022 Yes    Type 2 diabetes mellitus with stage 3 chronic kidney disease, without long-term current use of insulin ([address] 6/8/2022 Yes    Stage 3 chronic kidney disease ([address] 6/8/2022 Yes    Partial deafness of both ears 6/9/2022 Yes    COPD (chronic obstructive pulmonary disease) ([address] 6/8/2022 Yes    JOY (acute kidney injury) ([address] 6/9/2022 Yes    Chronic a-fib ([address] 6/8/2022 Yes             ''''''''''       MD PERRY Pichardo [address], [address] [place] [address].    Phone [phone]   Fax: [phone]  Answering Service: [phone]

## 2022-06-09 NOTE — PROGRESS NOTES
[facility]: RAFI PHELPS   Occupational Therapy Evaluation  Date: 22  Patient Name: Matt Berger       Room: 8537/0313-63  MRN: [id number]  Account: [de-identified]   : 1950  (72 y.o.) Gender: female     Discharge Recommendations:  Further Occupational Therapy is recommended upon facility discharge. OT Equipment Recommendations  Other: TBD    Referring Practitioner: Roland Bryant MD  Diagnosis: JOY       Treatment Diagnosis: impaired self care status  Past Medical History:  has a past medical history of Acute CVA (cerebrovascular accident) ([address] Altered mental status, Arthritis, Brain mass, Cellulitis and abscess, Cellulitis and abscess of unspecified site, Cellulitis of both lower extremities, Cellulitis of left lower extremity, Cellulitis of lower extremity, CHF (congestive heart failure) ([address] Chronic kidney disease, unspecified, Coronary atherosclerosis of native coronary artery, Essential hypertension, Essential hypertension, malignant, Hallucinations, Hard of hearing, Head contusion, Hypokalemia, Iron deficiency anemia, unspecified, Meningioma ([address] Myocardial infarction ([address] Other and unspecified hyperlipidemia, Pure hypercholesterolemia, Toxic metabolic encephalopathy, Type II or unspecified type diabetes mellitus without mention of complication, not stated as uncontrolled, Unspecified asthma(493.90), Unspecified venous (peripheral) insufficiency, Unspecified vitamin D deficiency, and UTI (urinary tract infection). Past Surgical History:   has a past surgical history that includes Tonsillectomy and adenoidectomy; Coronary artery bypass graft; intubation (3/7/2022); Thoracentesis (3/10/2022); Upper gastrointestinal endoscopy (N/A, 3/15/2022); and Colonoscopy (N/A, 3/15/2022).     Restrictions  Restrictions/Precautions: Fall Risk (Peripheral IV R forearm, ulcer wounds on bilateral posterior tibia)  Implants present? :  (stents)  Required Braces or Orthoses?: No     Vitals  Temp: 97.3 °F (36.3 °C)  Heart Rate: 59  Resp: 14  BP: (!) 147/52  Weight: 156 lb 4.9 oz (70.9 kg)  BMI (Calculated): 0  Oxygen Therapy  SpO2: 94 %  Pulse Oximetry Type: Intermittent  Pulse Oximeter Device Mode: Intermittent  O2 Device: None (Room air)  Level of Consciousness: Alert (0)    Subjective  Subjective: Pt resting in bed upon arrival. Pt was agreeable to OT/PT eval  Comments: Ok per RN for OT/PT eval     Vision  Vision Exceptions: Wears glasses at all times  Hearing  Hearing: Exceptions to [facility]  Hearing Exceptions: Hard of hearing/hearing concerns  Social/Functional History  Lives With: Son  Type of Home: House  Home Layout: Two level,1/2 bath on main level,Bed/Bath upstairs  Home Access: Stairs to enter with rails  Entrance Stairs - Number of Steps: 3  Entrance Stairs - Rails: Left  Bathroom Toilet: Handicap height  Bathroom Equipment: Grab bars around toilet  Bathroom Accessibility: Not accessible  Home Equipment: [employer], rolling,Wheelchair-manual,Lift chair  Has the patient had two or more falls in the past year or any fall with injury in the past year?: Unknown  Receives Help From: Family  ADL Assistance: Needs assistance (dtr reports pt is able to go to the bathroom on own and wash at the sink on the main floor.  Pt will ask for assist for dressing when needed.)  Homemaking Assistance: Needs assistance  Homemaking Responsibilities: No  Ambulation Assistance: Needs assistance (dtr helps with transfers and walking (1 agustin neededt), last time she walked was last march)  Transfer Assistance: Needs assistance  Active : No  Patient's  Info: dtr  Mode of Transportation: Car  IADL Comments: sleep on couch  Additional Comments: son works part-time but other family members can stay with pt during the day also, Dtr reports that the last time she walked was in March       Objective      Cognition  Overall Cognitive Status: WNL       ADL  Feeding: Setup  Grooming: Setup  UE Bathing: Stand by assistance  LE Bathing: Maximum assistance  UE Dressing: Stand by assistance  LE Dressing: Maximum assistance  Toileting: Maximum assistance  Additional Comments: ADL scores based on clinical reasoning and skilled observation unless otherwise noted. Pt currently limited due to decreased strength, balance, and activity tolerance impacting safety and independence with self care tasks    UE Function           LUE Strength  Gross LUE Strength: WFL  L Hand General: 4-/5     LUE Tone: Normotonic     LUE AROM (degrees)  LUE AROM : WFL     Left Hand AROM (degrees)  Left Hand AROM: WFL  RUE Strength  Gross RUE Strength: WFL  R Hand General: 4-/5      RUE Tone: Normotonic     RUE AROM (degrees)  RUE AROM : WFL     Right Hand AROM (degrees)  Right Hand AROM: WFL    Fine Motor Skills  Coordination  Movements Are Fluid And Coordinated: No  Coordination and Movement Description: Fine motor impairments,Decreased speed,Decreased accuracy,Right UE,Left UE (Due to swelling)                           Mobility  Supine to Sit: Unable to assess  Sit to Supine: Unable to assess     Balance  Standing Balance: Moderate assistance (Mod A and CGA for safety)     Functional Mobility  Functional - Mobility Device: Rolling Walker  Activity: Other (small steps forwards and back)  Assist Level:  Moderate assistance (Mod A and CGA for safety)  Functional Mobility Comments: Verbal cues for hand placement and safety  Bed mobility  Supine to Sit: Unable to assess  Sit to Supine: Unable to assess  Bed Mobility Comments: pt was sitting in bed side chair when entering room and left in bedside chair when leaving room     Transfers  Sit to stand: 2 Person assistance,Moderate assistance  Stand to sit: 2 Person assistance,Minimal assistance,Contact guard assistance  Transfer Comments: Verbal cues for hand placement and safety        Assessment  Assessment  Performance deficits / Impairments: Decreased ADL status,Decreased functional mobility ,Decreased strength,Decreased safe awareness,Decreased cognition,Decreased endurance,Decreased balance,Decreased high-level IADLs,Decreased posture  Treatment Diagnosis: impaired self care status  Prognosis: Good  Decision Making: Medium Complexity  Discharge Recommendations: Patient would benefit from continued therapy after discharge  Activity Tolerance: Patient limited by fatigue,Patient limited by pain,Patient Tolerated treatment well         Functional Outcome Measures  AM-PAC Daily Activity Inpatient   How much help for putting on and taking off regular lower body clothing?: A Lot  How much help for Bathing?: A Lot  How much help for Toileting?: A Lot  How much help for putting on and taking off regular upper body clothing?: A Little  How much help for taking care of personal grooming?: A Little  How much help for eating meals?: A Little  AM-[facility] Inpatient Daily Activity Raw Score: 15  AM-PAC Inpatient ADL T-Scale Score : 34.69  ADL Inpatient CMS 0-100% Score: 56.46  ADL Inpatient CMS G-Code Modifier : CK       Goals  Short Term Goals  Time Frame for Short term goals: by discharge  Short Term Goal 1: Pt will complete lower body dressing/bathing with min A and Good safety with use of AE as needed  Short Term Goal 2: Pt will complete functional transfers/mobility during self care tasks with Good safety  Short Term Goal 3: Pt will tolerate standing 5+ minutes during functional activity of choice with Good safety  Short Term Goal 4: Pt will verablize/demonstrate Good understanding fall prevention strategies to increase safety and independence with self care and mobility  Short Term Goal 5: Pt will particiapte in 15+ minutes of therapeutic exercises/functional activities to increase safety and independence with self care and mobility    Plan        Plan  Times per Week: 4-6  Current Treatment Recommendations: Self-Care / ADL,Strengthening,Balance training,Functional mobility training,Endurance training,Pain management,Safety education & training,Patient/Caregiver education & training,Equipment evaluation, education, & procurement       OT Equipment Recommendations  Other: TBD       Electronically signed by VERONICA Ramirez/L on 6/9/22 at 3:22 PM EDT

## 2022-06-09 NOTE — ACP (ADVANCE CARE PLANNING)
Advance Care Planning     General Advance Care Planning (ACP) Conversation    Date of Conversation: 6/7/2022  Conducted with: Patient with Decision Making Capacity    Healthcare Decision Maker:  No healthcare decision makers have been documented. Click here to complete [place] including selection of the Healthcare Decision Maker Relationship (ie \"Primary\")   Today we discussed [place]. The patient is considering options. Content/Action Overview:  Has NO ACP documents/care preferences - requested patient complete ACP documents    Writer had conversation with patient in regards to the ACP documents. She had many questions and wants to fill the documents out, but today was not a good day. She will contact the [facility] office when she wants to complete them. She has no spouse and has four children and the hierarchy of [facility] was explained to patient.       Length of Voluntary ACP Conversation in minutes:  30 minutes    Joy Waldron

## 2022-06-09 NOTE — DISCHARGE INSTR - COC
Continuity of Care Form    Patient Name: Betty Ni   :    MRN:  [id number]    Admit date:  2022  Discharge date:  ***    Code Status Order: Full Code   Advance Directives:      Admitting Physician:  Satnam Gustafson MD  PCP: Renata Bynum MD    Discharging Nurse: [facility] Unit/Room#: 2057/7-01  Discharging Unit Phone Number: ***    Emergency Contact:   Extended Emergency Contact Information  Primary Emergency Contact: Luisito N[address] Phone: [phone]  Relation: Child  Secondary Emergency Contact: CYRIL NELSON  Mobile Phone: [phone]  Relation: Child  Preferred language: English    Past Surgical History:  Past Surgical History:   Procedure Laterality Date    COLONOSCOPY N/A 3/15/2022    COLONOSCOPY DIAGNOSTIC performed by Gio Simon MD at [facility]      x 3, Dr. Margaretha Bosworth  3/7/2022         THORACENTESIS  3/10/2022         TONSILLECTOMY AND ADENOIDECTOMY      UPPER GASTROINTESTINAL ENDOSCOPY N/A 3/15/2022    EGD BIOPSY performed by Gio Simon MD at [facility] ENDO       Immunization History: There is no immunization history on file for this patient. Active Problems:  Patient Active Problem List   Diagnosis Code    Vitamin D deficiency E55.9    Venous insufficiency of both lower extremities I87.2    Asthma J45.909    Type 2 diabetes mellitus with stage 3 chronic kidney disease, without long-term current use of insulin ([facility]) E11.22, N18.30    Coronary artery disease involving native coronary artery of native heart without angina pectoris I25.10    Iron deficiency anemia D50.9    Stage 3 chronic kidney disease (HCC) N18.30    Colonoscopy refused Z53.20    Pneumococcal vaccination declined Z28.21    Partial deafness of both ears H91.93    Chronic diastolic CHF (congestive heart failure) ([facility]) I50.32    COPD (chronic obstructive pulmonary disease) ([facility]) J44.9    HTN.  Benign hypertension with CKD (chronic kidney disease) stage III ([facility] I12.9, N18.30    Gout with tophi M1A. 9XX1    Hyperlipidemia with target LDL less than 70 E78.5    Dry skin dermatitis HANDS L85.3    Hypertensive emergency I16.1    Meningioma, cerebral (HCC) D32.0    Paroxysmal atrial fibrillation (HCC) I48.0    Influenza vaccination declined Z28.21    Recurrent UTI N39.0    Elevated LFTs R79.89    Cellulitis of both lower extremities L03.115, L03.116    Hypomagnesemia E83.42    Kidney stones N20.0    Diverticulosis K57.90    COVID-19 vaccination declined Z28.21    Acute kidney injury ([facility] N17.9    Acute on chronic combined systolic (congestive) and diastolic (congestive) heart failure (HCC) I50.43    Chronic a-fib (HCC) I48.20    Chronic venous stasis dermatitis of both lower extremities I87.2    Symptomatic anemia D64.9    Family history of colon cancer Z80.0    Family history of pancreatic cancer Z80.0    Mild malnutrition ([facility]) E44.1    Decreased strength, endurance, and mobility R53.1, Z74.09, R68.89    Bilateral lower extremity edema R60.0    Non-pressure chronic ulcer of other part of left lower leg limited to breakdown of skin ([facility] L97.821    Bleeding nose R04.0    Unintended weight loss R63.4    Lymphedema I89.0    Peripheral vascular disease, unspecified ([facility]) I73.9    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 ([facility] [address] L97.811    Acute kidney injury superimposed on chronic kidney disease ([facility] [address] N17.9, N18.9    Anticoagulated Z79.01    Hypertensive urgency with nyla I16.0       Isolation/Infection:   Isolation            No Isolation          Patient Infection Status       Infection Onset Added Last Indicated Last Indicated By Review Planned Expiration Resolved Resolved By    None active    Resolved    COVID-19 (Rule Out) 03/01/22 03/01/22 03/01/22 COVID-19 & Influenza Combo (Ordered)   03/01/22 Rule-Out Test Resulted    COVID-19 (Rule Out) 02/06/22 02/06/22 02/06/22 COVID-19 & Influenza Combo (Ordered)   02/06/22 Rule-Out Test Resulted    COVID-19 (Rule Out) 07/25/20 07/25/20 07/25/20 COVID-19 (Ordered)   07/25/20 Rule-Out Test Resulted            Nurse Assessment:  Last Vital Signs: BP (!) 147/52   Pulse 59   Temp 97.3 °F (36.3 °C) (Axillary)   Resp 14   Wt 156 lb 4.9 oz (70.9 kg)   SpO2 94%   BMI 31.57 kg/m²     Last documented pain score (0-10 scale): Pain Level: 4  Last Weight:   Wt Readings from Last 1 Encounters:   06/09/22 156 lb 4.9 oz (70.9 kg)     Mental Status:  {IP PT MENTAL STATUS:20030}    IV Access:  { WILL IV ACCESS:683235533}    Nursing Mobility/ADLs:  Walking   {CHP DME CUAH:824306260}  Transfer  {CHP DME PWGJ:233950502}  Bathing  {CHP DME VUDQ:152035686}  Dressing  {CHP DME SVDX:364158172}  Toileting  {CHP DME FSJT:385080027}  Feeding  {CHP DME EZWA:067970562}  Med Admin  {P DME QDKT:219777525}  Med Delivery   { WILL MED Delivery:057621873}    Wound Care Documentation and Therapy:  Wound 05/20/22 Right Wound #1 right lower  leg cluster- converged with #2 (Active)   Wound Image    05/24/22 1520   Wound Etiology Venous 06/08/22 1405   Dressing Status Old drainage noted;New dressing applied 06/08/22 1405   Wound Cleansed Soap and water 06/08/22 1405   Dressing/Treatment Hydrofiber Ag;ABD;Other (comment) 06/08/22 1405   Wound Length (cm) 0.1 cm 06/08/22 1405   Wound Width (cm) 0.1 cm 06/08/22 1405   Wound Depth (cm) 0.1 cm 06/08/22 1405   Wound Surface Area (cm^2) 0.01 cm^2 06/08/22 1405   Change in Wound Size % (l*w) 99.93 06/08/22 1405   Wound Volume (cm^3) 0.001 cm^3 06/08/22 1405   Wound Healing % 100 06/08/22 1405   Post-Procedure Length (cm) 12 cm 06/01/22 1324   Post-Procedure Width (cm) 30 cm 06/01/22 1324   Post-Procedure Depth (cm) 0.1 cm 06/01/22 1324   Post-Procedure Surface Area (cm^2) 360 cm^2 06/01/22 1324   Post-Procedure Volume (cm^3) 36 cm^3 06/01/22 1324   Wound Assessment Other (Comment) 06/08/22 1915   Drainage Amount Other (Comment) 06/08/22 1915   Drainage Description Serosanguinous; Serous; Yellow 06/01/22 1324   Odor None 06/08/22 1405   Marian-wound Assessment Blanchable erythema;Fragile 06/08/22 1405   Margins Attached edges 06/08/22 1405   Wound Thickness Description not for Pressure Injury Full thickness 06/01/22 1324   Number of days: 20       Wound 05/20/22 Left;Proximal Wound # 4 Left lower lat leg (Active)   Wound Image   05/24/22 1520   Wound Etiology Venous 06/08/22 1405   Dressing Status Old drainage noted;New dressing applied 06/08/22 1405   Wound Cleansed Soap and water 06/08/22 1405   Dressing/Treatment Hydrofiber Ag;ABD;Other (comment) 06/08/22 1405   Wound Length (cm) 0.1 cm 06/08/22 1405   Wound Width (cm) 0.1 cm 06/08/22 1405   Wound Depth (cm) 0.1 cm 06/08/22 1405   Wound Surface Area (cm^2) 0.01 cm^2 06/08/22 1405   Change in Wound Size % (l*w) 99.93 06/08/22 1405   Wound Volume (cm^3) 0.001 cm^3 06/08/22 1405   Wound Healing % 100 06/08/22 1405   Post-Procedure Length (cm) 13 cm 06/01/22 1324   Post-Procedure Width (cm) 11 cm 06/01/22 1324   Post-Procedure Depth (cm) 0 cm 06/01/22 1324   Post-Procedure Surface Area (cm^2) 143 cm^2 06/01/22 1324   Post-Procedure Volume (cm^3) 0 cm^3 06/01/22 1324   Wound Assessment Other (Comment) 06/08/22 1915   Drainage Amount Other (Comment) 06/08/22 1915   Drainage Description Serosanguinous 06/08/22 1405   Odor None 06/08/22 1405   Marian-wound Assessment Blanchable erythema;Fragile 06/08/22 1405   Margins Attached edges 06/08/22 1405   Wound Thickness Description not for Pressure Injury Full thickness 06/01/22 1324   Number of days: 20        Elimination:  Continence:    Bowel: {YES / OS:30168}  Bladder: {YES / NQ:09819}  Urinary Catheter: {Urinary Catheter:657158837}   Colostomy/Ileostomy/Ileal Conduit: {YES / VC:80410}       Date of Last BM: ***    Intake/Output Summary (Last 24 hours) at 6/9/2022 1429  Last data filed at 6/9/2022 0519  Gross per 24 hour   Intake 596.57 ml   Output 550 ml   Net 46.57 ml     I/O last 3 completed shifts:   In: 596.6 [I.V.:596.6]  Out: 750 [Urine:750]    Safety Concerns:     { WILL Safety Concerns:750372821}    Impairments/Disabilities:      { WILL Impairments/Disabilities:684629127}    Nutrition Therapy:  Current Nutrition Therapy:   [address] WILL Diet List:614392756}    Routes of Feeding: {CHP DME Other Feedings:139490558}  Liquids: {Slp liquid thickness:55785}  Daily Fluid Restriction: {CHP DME Yes amt example:237705683}  Last Modified Barium Swallow with Video (Video Swallowing Test): {Done Not Done PLYM:925066051}    Treatments at the Time of Hospital Discharge:   Respiratory Treatments: ***  Oxygen Therapy:  {Therapy; copd oxygen:04978}  Ventilator:    { CC Vent DVEN:274502929}    Rehab Therapies: {THERAPEUTIC INTERVENTION:7429989694}  Weight Bearing Status/Restrictions: {[facility] Weight Bearin}  Other Medical Equipment (for information only, NOT a DME order):  {EQUIPMENT:044999310}  Other Treatments: ***    Patient's personal belongings (please select all that are sent with patient):  {[facility] DME Belongings:611224554}    RN SIGNATURE:  {Esignature:563258620}    CASE MANAGEMENT/SOCIAL WORK SECTION    Inpatient Status Date: ***    Readmission Risk Assessment Score:  Readmission Risk              Risk of Unplanned Readmission:  34           Discharging to Facility/ Agency   Name:   Address:  Phone:  Fax:    Dialysis Facility (if applicable)   Name:  Address:  Dialysis Schedule:  Phone:  Fax:    / signature: {Esignature:654622415}    PHYSICIAN SECTION    Prognosis: {Prognosis:1860790559}    Condition at Discharge: [address] Patient Condition:734052548}    Rehab Potential (if transferring to Rehab): {Prognosis:1363508607}    Recommended Labs or Other Treatments After Discharge: ***    Physician Certification: I certify the above information and transfer of Zack Valero  is necessary for the continuing treatment of the diagnosis listed and that she requires {Admit to Appropriate Level of Care:80844} for {GREATER/LESS:797128804} 30 days.      Update Admission H&P: {CHP DME Changes in VXAR}    PHYSICIAN SIGNATURE:  Electronically signed by Deja Shukla MD on 22 at 2:29 PM EDT

## 2022-06-09 NOTE — FLOWSHEET NOTE
See ACP note       06/09/22 1207   Encounter Summary   Encounter Overview/Reason  Advance Care Planning;Spiritual/Emotional Needs   Service Provided For: Patient and family together   Referral/Consult From: Nurse   Support System Children   Last Encounter  06/09/22   Complexity of Encounter Moderate   Begin Time 1130   End Time  1200   Total Time Calculated 30 min   Spiritual/Emotional needs   Type Spiritual Support   Advance Care Planning   Type ACP conversation   Assessment/Intervention/Outcome   Assessment Calm   Intervention Active listening;Prayer (assurance of)/[place]   Outcome Receptive;Engaged in conversation;Expressed Gratitude

## 2022-06-09 NOTE — PROGRESS NOTES
Physical Therapy  Facility/Department: [facility] MED SURG  Physical Therapy Initial Assessment    Name: Fer Pedro  :   MRN: [id number]  Date of Service: 2022    Discharge Recommendations:  Patient would benefit from continued therapy after discharge,Therapy recommended at discharge   PT Equipment Recommendations  Equipment Needed: No      Patient Diagnosis(es): The encounter diagnosis was JOY (acute kidney injury) ([facility] [address] Past Medical History:  has a past medical history of Acute CVA (cerebrovascular accident) ([address] Altered mental status, Arthritis, Brain mass, Cellulitis and abscess, Cellulitis and abscess of unspecified site, Cellulitis of both lower extremities, Cellulitis of left lower extremity, Cellulitis of lower extremity, CHF (congestive heart failure) ([address] Chronic kidney disease, unspecified, Coronary atherosclerosis of native coronary artery, Essential hypertension, Essential hypertension, malignant, Hallucinations, Hard of hearing, Head contusion, Hypokalemia, Iron deficiency anemia, unspecified, Meningioma ([address] Myocardial infarction ([address] Other and unspecified hyperlipidemia, Pure hypercholesterolemia, Toxic metabolic encephalopathy, Type II or unspecified type diabetes mellitus without mention of complication, not stated as uncontrolled, Unspecified asthma(493.90), Unspecified venous (peripheral) insufficiency, Unspecified vitamin D deficiency, and UTI (urinary tract infection). Past Surgical History:  has a past surgical history that includes Tonsillectomy and adenoidectomy; Coronary artery bypass graft; intubation (3/7/2022); Thoracentesis (3/10/2022); Upper gastrointestinal endoscopy (N/A, 3/15/2022); and Colonoscopy (N/A, 3/15/2022). Assessment   Body Structures, Functions, Activity Limitations Requiring Skilled Therapeutic Intervention: Decreased functional mobility ; Decreased ADL status; Decreased ROM; Decreased body mechanics; Decreased strength;Decreased endurance;Decreased balance;Decreased coordination;Decreased posture; Increased pain  Assessment: Pt has a fair PT prognosis due to decline in vitals and other co-morbidites as well as decreased functional mobility prior to admission. Pt is a ModAx2 fo sit>stand, gait of 4 steps ModAx1 wirh 2nd assist for safett and Rw used, stand>sit MinA1 with 2nd assist for safety and RW used. Pt indicate increased pain in feet with WB which limited the amount of steps taken. Treatment Diagnosis: decreased overall functional mobilty due to decline in overall health and increased pain  Specific Instructions for Next Treatment: progress in: distance walked, strength in Bilat legs, balance, level of assist in transfers  Therapy Prognosis: Fair  Decision Making: Medium Complexity  History: CVA  Exam: ROM, strength, functional mobility  Clinical Presentation: Pt is a ModAx2 fo sit>stand, gait of 4 steps ModAx1 wirh 2nd assist for safett and Rw used, stand>sit MinA1 with 2nd assist for safety and RW used. Pt indicate increased pain in feet with WB which limited the amount of steps taken. Barriers to Learning: [place]  Requires PT Follow-Up: Yes  Activity Tolerance  Activity Tolerance: Patient limited by pain; Patient limited by fatigue;Patient limited by endurance     Plan   Plan  Plan: 5-7 times per week  Specific Instructions for Next Treatment: progress in: distance walked, strength in Bilat legs, balance, level of assist in transfers  Current Treatment Recommendations: Strengthening,ROM,Balance training,Functional mobility training,Transfer training,Gait training,Stair training,Safety education & training,Pain management,Therapeutic activities  Safety Devices  Type of Devices: Gait belt,Heels elevated for pressure relief,Call light within reach,All fall risk precautions in place,Left in chair,Patient at risk for falls,Nurse notified (nurse Eddy (reported the edema in bilateral hands)) Restrictions  Restrictions/Precautions  Restrictions/Precautions: Fall Risk (Peripheral IV R forearm, ulcer wounds on bilateral posterior tibia)  Required Braces or Orthoses?: No  Implants present? :  (stents)     Subjective   Pain: ulcers on legs are painful rates 4-5/10 and ribs/R lower back 5/10  General  Patient assessed for rehabilitation services?: Yes  Response To Previous Treatment: Not applicable  Family / Caregiver Present: Yes (dtr)  Referring Practitioner: Dr. Jannet Mckeon  Referral Date : 06/08/22  Diagnosis: acute kidney injury superimposed on chronic kidney disease  Follows Commands: Within Functional Limits  General Comment  Comments: okayed to proceed with PT/OT iván [place]  Subjective  Subjective: Pt dtr verified social/functional history due to pt [place]         Social/Functional History  Social/Functional History  Lives With: Son  Type of Home: House  Home Layout: Two level,1/2 bath on main level,Bed/Bath upstairs  Home Access: Stairs to enter with rails  Entrance Stairs - Number of Steps: 3  Entrance Stairs - Rails: Left  Bathroom Toilet: Handicap height  Bathroom Equipment: Grab bars around toilet  Bathroom Accessibility: Not accessible  Home Equipment: Keke Clos, rolling,Wheelchair-manual,Lift chair  Has the patient had two or more falls in the past year or any fall with injury in the past year?: Unknown  Receives Help From: Family  ADL Assistance: Needs assistance (dtr reports pt is able to go to the bathroom on own and wash at the sink on the main floor.  Pt will ask for assist for dressing when needed.)  Homemaking Assistance: Needs assistance  Homemaking Responsibilities: No  Ambulation Assistance: Needs assistance (dtr helps with transfers and walking (1 agustin neededt), last time she walked was last march)  Transfer Assistance: Needs assistance  Active : No  Patient's  Info: dtr  Mode of Transportation: Car  IADL Comments: sleep on couch  Additional Comments: son works part-time but other family members can stay with pt during the day also, Dtr reports that the last time she walked was in March  Vision/Hearing  Vision  Vision: Impaired  Vision Exceptions: Wears glasses at all times  Hearing  Hearing: Exceptions to [address]   Orientation  Overall Orientation Status: Within Normal Limits  Orientation Level: Oriented X4  Cognition  Overall Cognitive Status: WNL     Objective     O2 Device: None (Room air)     Observation/Palpation  Posture: Poor  Observation: Peripheral IV R forearm, ulcer wounds on bilateral posterior tibia (ace wrap on both legs)  Edema: edema in bilateral hands (R>L)  Gross Assessment  Sensation: Intact     AROM RLE (degrees)  RLE AROM: Exceptions  RLE General AROM: generalized weakness for AROM  R Hip Flexion 0-125: 0-98  AROM LLE (degrees)  LLE AROM : Exceptions  LLE General AROM: generalized weakness for AROM  L Hip Flexion 0-125: 0-98  AROM RUE (degrees)  RUE General AROM: see OT UE assessment  AROM LUE (degrees)  LUE General AROM: see OT UE assessment  Strength RLE  Strength RLE: Exception  R Hip Flexion: 2+/5  R Knee Flexion: 3-/5  R Knee Extension: 3-/5  R Ankle Dorsiflexion: 3-/5  R Ankle Plantar flexion: 3-/5  Strength LLE  Strength LLE: Exception  L Hip Flexion: 2+/5  L Knee Flexion: 3-/5  L Knee Extension: 3-/5  L Ankle Dorsiflexion: 3-/5  L Ankle Plantar Flexion: 3-/5  Strength RUE  Strength RUE: Exception  Comment: generalized weakness for AROM  Strength LUE  Comment: generalized weakness for AROM           Bed mobility  Supine to Sit: Unable to assess  Sit to Supine: Unable to assess  Bed Mobility Comments: pt was sitting in bed side chair when entering room and left in bedside chair when leaving room  Transfers  Sit to Stand:  Moderate Assistance;2 Person Assistance (RW used)  Bed to Chair: Minimal assistance;2 Person Assistance (RW used, 2nd assist CGA for safety)  Comment: pt denies any dizziness or lightheaded, Pt reports feeling good standing up but pain increased in feet  Ambulation  Surface: level tile  Device: Rolling Walker  Assistance: Moderate assistance;2 Person assistance (RW used, 2nd assist CGA for safety)  Gait Deviations: Slow Viania; Shuffles  Distance: 4 steps  Comments: pt has severe upper thoracic kyphosis, and decreased balance in stance  Stairs/Curb  Stairs?: No     Balance  Posture: Poor  Sitting - Static: Fair;+  Sitting - Dynamic: Fair;-  Standing - Static: Poor;+ (RW used)  Standing - Dynamic: Poor;+ (RW used)           OutComes Score                                                  AM-PAC Score  AM-PAC Inpatient Mobility Raw Score : 6 (06/09/22 0828)  AM-PAC Inpatient T-Scale Score : 23.55 (06/09/22 0828)  Mobility Inpatient CMS 0-100% Score: 100 (06/09/22 0828)  Mobility Inpatient CMS G-Code Modifier : CN (06/09/22 2627)          Goals  Short Term Goals  Time Frame for Short term goals: 5-7 days  Short term goal 1: Pt will be able to tolerate one thirty min therapeutic exercise session to show increased activity tolerance. Short term goal 2: Pt will increase her MMT grade by one half in bilateral LE to show improvement in overall LE strength. Short term goal 3: Pt will be able to stand with least restrictive device for 3-5 minutes with MINAx1 and be graded a fair- to show improvement in standing balance. Short term goal 4: Pt will be able to achieve 40-50ft with least restrictive device MinAx1 with WC follow to progress pt ability in distance ambulating. Short term goal 5: Pt will achieve a sit<>stand with least restrictive device & MinAx1 to show improvement in transfer. Additional Goals?: Yes  Short Term Goal 6: Pt will be able to acheive 6-8\" step with HR on L for 3 times with least restrictive device to be able to show ability to enter home.        Education  Patient Education  Education Given To: Patient  Education Method: Demonstration;Verbal  Barriers to Learning: Hearing  Education Outcome: Continued education needed      Therapy Time   Individual Concurrent Group Co-treatment   Time In 0828         Time Out 0857         Minutes 29         Timed Code Treatment Minutes: 8 Minutes       Aguila Gramajo     PT evaluation/treatment is completed by ALBA Hollins under the direct supervision of co-signing therapist, who agrees with all documentation and evaluation/treatment.

## 2022-06-09 NOTE — PLAN OF CARE
Problem: Discharge Planning  Goal: Discharge to home or other facility with appropriate resources  Outcome: Progressing     Problem: Pain  Goal: Verbalizes/displays adequate comfort level or baseline comfort level  Outcome: Adequate for Discharge     Problem: ABCDS Injury Assessment  Goal: Absence of physical injury  Outcome: Adequate for Discharge  Flowsheets (Taken 6/9/2022 0142 by Gabrielle Farmer RN)  Absence of Physical Injury: Implement safety measures based on patient assessment     Problem: Skin/Tissue Integrity  Goal: Absence of new skin breakdown  Description: 1. Monitor for areas of redness and/or skin breakdown  2. Assess vascular access sites hourly  3. Every 4-6 hours minimum:  Change oxygen saturation probe site  4. Every 4-6 hours:  If on nasal continuous positive airway pressure, respiratory therapy assess nares and determine need for appliance change or resting period.   Outcome: Adequate for Discharge     Problem: Safety - Adult  Goal: Free from fall injury  Outcome: Progressing  Flowsheets (Taken 6/9/2022 0142 by Gabrielle Farmer RN)  Free From Fall Injury: Instruct family/caregiver on patient safety     Problem: Chronic Conditions and Co-morbidities  Goal: Patient's chronic conditions and co-morbidity symptoms are monitored and maintained or improved  Outcome: Progressing     Problem: Skin/Tissue Integrity - Adult  Goal: Incisions, wounds, or drain sites healing without S/S of infection  Outcome: Progressing  Flowsheets (Taken 6/9/2022 0142 by Gabrielle Farmer RN)  Incisions, Wounds, or Drain Sites Healing Without Sign and Symptoms of Infection:   ADMISSION and DAILY: Assess and document risk factors for pressure ulcer development   TWICE DAILY: Assess and document skin integrity   TWICE DAILY: Assess and document dressing/incision, wound bed, drain sites and surrounding tissue     Problem: Musculoskeletal - Adult  Goal: Return mobility to safest level of function  Outcome: Progressing     Problem: Infection - Adult  Goal: Absence of infection at discharge  Outcome: Adequate for Discharge

## 2022-06-09 NOTE — PROGRESS NOTES
Writer noticed pt IV had infiltrated at the insertion site, IV removed. No pain reported. New IV placed, pt tolerated well.

## 2022-06-09 NOTE — PROGRESS NOTES
Counts include 234 beds at the [facility]    Progress Note     6/9/2022    9:43 AM    Name:   Carli Juarez  MRN:     [id number]     Acct:      [de-identified]   Room:   2057/2057-01  IP Day:  1  Admit Date:  6/7/2022  7:39 PM    PCP:   Mary Gabriel MD  Code Status:  Full Code    Subjective:     C/C:   Chief Complaint   Patient presents with    Abnormal Lab     Principal Problem:    Acute kidney injury superimposed on chronic kidney disease ([facility] [address]  Active Problems:    Bilateral lower extremity edema    Anticoagulated    Hypertensive urgency with nyla    Venous insufficiency of both lower extremities    Type 2 diabetes mellitus with stage 3 chronic kidney disease, without long-term current use of insulin (HCC)    Stage 3 chronic kidney disease (HCC)    Partial deafness of both ears    COPD (chronic obstructive pulmonary disease) ([facility] [address]    Hypertensive emergency    Chronic a-fib ([facility]  Resolved Problems:    * No resolved hospital problems. *       BP improved . Not in CHF   nyla on ckd , proteinuria   Creatinine mildly improved     BMP:   Recent Labs     06/08/22  1220 06/09/22  0600    140   K 4.2 4.4   CO2 23 26   BUN 83* 84*   CREATININE 1.92* 1.81*   LABGLOM 26* 27*   GLUCOSE 113* 112*                 nephroloy input noted  Assessment:  1. Acute kidney injury on CKD, most likely secondary to prerenal azotemia due to diuretic use and third spacing, due to hypoalbuminemia secondary to nephrotic syndrome  2. Peripheral edema nephrotic syndrome hypoalbuminemia  3. CKD stage IIIb Baseline creatinine 1.4 to 1.8 mg/dL, with nephrotic syndrome and nephrotic range proteinuria UPC 20 g, most likely secondary to diabetic nephropathy  4. CHF with preserved EF mild LVH diastolic dysfunction  5. Hypertension suboptimally controlled        Plan:  1. Hold diuretics  2. Gentle hydration IV fluid trial  3. Increase hydralazine 50 mg 3 times daily, metoprolol 50 mg twice a day  4.  And amlodipine 5 mg 269 150 - 450 k/uL    MPV 7.5 6.0 - 12.0 fL    Seg Neutrophils 74 (H) 36 - 66 %    Lymphocytes 15 (L) 24 - 44 %    Monocytes 8 (H) 1 - 7 %    Eosinophils % 2 0 - 4 %    Basophils 1 0 - 2 %    Segs Absolute 6.40 1.3 - 9.1 k/uL    Absolute Lymph # 1.20 1.0 - 4.8 k/uL    Absolute Mono # 0.60 0.1 - 1.3 k/uL    Absolute Eos # 0.10 0.0 - 0.4 k/uL    Basophils Absolute 0.00 0.0 - 0.2 k/uL   Basic Metabolic Panel w/ Reflex to MG    Collection Time: 06/08/22 12:20 PM   Result Value Ref Range    Glucose 113 (H) 70 - 99 mg/dL    BUN 83 (H) 8 - 23 mg/dL    CREATININE 1.92 (H) 0.50 - 0.90 mg/dL    Calcium 8.4 (L) 8.6 - 10.4 mg/dL    Sodium 141 135 - 144 mmol/L    Potassium 4.2 3.7 - 5.3 mmol/L    Chloride 106 98 - 107 mmol/L    CO2 23 20 - 31 mmol/L    Anion Gap 12 9 - 17 mmol/L    GFR Non-African American 26 (L) >60 mL/min    GFR  31 (L) >60 mL/min    GFR Comment         Urinalysis with microscopic    Collection Time: 06/08/22  9:52 PM   Result Value Ref Range    Color, UA Yellow Yellow    Turbidity UA Clear Clear    Glucose, Ur TRACE (A) NEGATIVE    Bilirubin Urine NEGATIVE NEGATIVE    Ketones, Urine NEGATIVE NEGATIVE    Specific Gravity, UA 1.022 1.000 - 1.030    Urine Hgb MOD (A) NEGATIVE    pH, UA 7.5 5.0 - 8.0    Protein, UA 4+ (A) NEGATIVE    Urobilinogen, Urine Normal Normal    Nitrite, Urine POSITIVE (A) NEGATIVE    Leukocyte Esterase, Urine SMALL (A) NEGATIVE    WBC, UA 3 to 5 /HPF    RBC, UA 0 TO 2 /HPF    Casts UA 3 to 5 /LPF    Epithelial Cells UA 0 TO 2 /HPF    Bacteria, UA MANY (A) None   Sodium, urine, random    Collection Time: 06/08/22  9:52 PM   Result Value Ref Range    Sodium,Ur <20 mmol/L   Protein, urine, random    Collection Time: 06/08/22  9:52 PM   Result Value Ref Range    Total Protein, Urine 1,010 mg/dL   Protein / Creatinine Ratio, Urine    Collection Time: 06/08/22  9:52 PM   Result Value Ref Range    Total Protein, Urine 1,010 mg/dL    Creatinine, Ur 63.0 28.0 - 217.0 mg/dL Urine Total Protein Creatinine Ratio 16.03 (H) 0.00 - 0.20   Magnesium    Collection Time: 06/09/22  6:00 AM   Result Value Ref Range    Magnesium 2.3 1.6 - 2.6 mg/dL   Protime-INR    Collection Time: 06/09/22  6:00 AM   Result Value Ref Range    Protime 13.9 11.8 - 14.6 sec    INR 1.1    CBC with Auto Differential    Collection Time: 06/09/22  6:00 AM   Result Value Ref Range    WBC 8.1 3.5 - 11.0 k/uL    RBC 4.04 4.0 - 5.2 m/uL    Hemoglobin 12.5 12.0 - 16.0 g/dL    Hematocrit 38.3 36 - 46 %    MCV 95.0 80 - 100 fL    MCH 30.9 26 - 34 pg    MCHC 32.6 31 - 37 g/dL    RDW 16.8 (H) 11.5 - 14.9 %    Platelets 682 534 - 677 k/uL    MPV 7.5 6.0 - 12.0 fL    Seg Neutrophils 76 (H) 36 - 66 %    Lymphocytes 14 (L) 24 - 44 %    Monocytes 8 (H) 1 - 7 %    Eosinophils % 2 0 - 4 %    Basophils 0 0 - 2 %    Segs Absolute 6.10 1.3 - 9.1 k/uL    Absolute Lymph # 1.10 1.0 - 4.8 k/uL    Absolute Mono # 0.60 0.1 - 1.3 k/uL    Absolute Eos # 0.20 0.0 - 0.4 k/uL    Basophils Absolute 0.00 0.0 - 0.2 k/uL   Basic Metabolic Panel w/ Reflex to MG    Collection Time: 06/09/22  6:00 AM   Result Value Ref Range    Glucose 112 (H) 70 - 99 mg/dL    BUN 84 (H) 8 - 23 mg/dL    CREATININE 1.81 (H) 0.50 - 0.90 mg/dL    Calcium 8.5 (L) 8.6 - 10.4 mg/dL    Sodium 140 135 - 144 mmol/L    Potassium 4.4 3.7 - 5.3 mmol/L    Chloride 107 98 - 107 mmol/L    CO2 26 20 - 31 mmol/L    Anion Gap 7 (L) 9 - 17 mmol/L    GFR Non-African American 27 (L) >60 mL/min    GFR  33 (L) >60 mL/min    GFR Comment           No results for input(s): POCGLU in the last 72 hours. XR CHEST (2 VW)    Result Date: 6/7/2022  EXAMINATION: TWO XRAY VIEWS OF THE CHEST 6/7/2022 9:38 pm COMPARISON: Chest 05/18/2022 HISTORY: ORDERING SYSTEM PROVIDED HISTORY: eval pulm edema TECHNOLOGIST PROVIDED HISTORY: eval pulm edema Reason for Exam: PT CO mild cough with SOB and fatigue X several days. FINDINGS: The cardiac silhouette is enlarged.   Calcifications involving the aorta reflect atherosclerosis. The mediastinal and hilar silhouettes appear unremarkable. Chronic appearing coarse interstitial densities predominantly parahilar regions and lower lungs. Chronic appearing coarse interstitial densities predominate perihilar regions and lung bases, typical of sequela from smoking or other previous infectious/inflammatory process. No dense consolidation or pleural effusion evident. No pneumothorax is seen. No acute osseous abnormality is identified. Sequela from prior median sternotomy. Hyperkyphosis and barrel chest configuration. Bones appear osteoporotic. 1. No definite acute pulmonary disease. 2.  Pulmonary sequela typical of that seen with smoking, including possible COPD; correlate with clinical history. 3. Calcific atherosclerosis aorta. 4. Cardiomegaly. On admission         Review of Systems:     Constitutional:  negative for chills, fevers, sweats  Respiratory:  negative for cough, dyspnea on exertion, hemoptysis, shortness of breath, wheezing  Cardiovascular:  negative for chest pain, chest pressure/discomfort, lower extremity edema, palpitations  Gastrointestinal:  negative for abdominal pain, constipation, diarrhea, nausea, vomiting  Neurological:  negative for dizziness, headache  Data:     Past Medical History:  no change     Social History:  no change    Family History: @no change    Vitals:      I/O (24Hr): Intake/Output Summary (Last 24 hours) at 6/9/2022 0943  Last data filed at 6/9/2022 0519  Gross per 24 hour   Intake 596.57 ml   Output 550 ml   Net 46.57 ml       Labs:    Radiology:    Medications:      Allergies:      Current Meds:   Scheduled Meds:    sodium chloride flush  5-40 mL IntraVENous 2 times per day    [Held by provider] allopurinol  300 mg Oral Daily    amiodarone  200 mg Oral Daily    apixaban  2.5 mg Oral BID    atorvastatin  40 mg Oral Daily    cetirizine  5 mg Oral Daily    ferrous sulfate  325 mg Oral Daily with breakfast    magnesium oxide  400 mg Oral BID    miconazole   Topical BID    therapeutic multivitamin-minerals  1 tablet Oral Daily    metoprolol tartrate  50 mg Oral BID    hydrALAZINE  50 mg Oral TID    amLODIPine  5 mg Oral Nightly     Continuous Infusions:    sodium chloride      sodium chloride 50 mL/hr at 22 0541     PRN Meds: sodium chloride flush, sodium chloride, ondansetron **OR** ondansetron, acetaminophen **OR** acetaminophen, albuterol, albuterol sulfate HFA, saline nasal gel, hydrALAZINE      Physical Examination:        BP (!) 167/72   Pulse 70   Temp 97.7 °F (36.5 °C)   Resp 18   Wt 156 lb 4.9 oz (70.9 kg)   SpO2 92%   BMI 31.57 kg/m²   Temp (24hrs), Av.8 °F (36.6 °C), Min:97.2 °F (36.2 °C), Max:98.2 °F (36.8 °C)    No results for input(s): POCGLU in the last 72 hours. Intake/Output Summary (Last 24 hours) at 2022 0943  Last data filed at 2022 0519  Gross per 24 hour   Intake 596.57 ml   Output 550 ml   Net 46.57 ml       General Appearance:  alert, well appearing, and in no acute distress  Mental status:   Head:  normocephalic, atraumatic. Eye: no icterus, redness, pupils equal and reactive, extraocular eye movements intact, conjunctiva clear  Ear: normal external ear, no discharge, hearing intact  Nose:  no drainage noted  Mouth: mucous membranes moist  Neck: supple, no carotid bruits, thyroid not palpable  Lungs: Bilateral equal air entry, clear to ausculation, no wheezing, rales or rhonchi, normal effort  Cardiovascular: normal rate, regular rhythm, no murmur, gallop, rub.   Abdomen: Soft, nontender, nondistended, normal bowel sounds, no hepatomegaly or splenomegaly  Neurologic: There are no new focal motor or sensory deficits,   Skin: No gross lesions, rashes, bruising or bleeding on exposed skin area  Extremities:  peripheral pulses palpable, no pedal edema or calf pain with palpation  Psych:             Assessment:        Primary Problem  Acute kidney injury superimposed on chronic kidney disease (HCC)    Principal Problem:    Acute kidney injury superimposed on chronic kidney disease ([address]  Active Problems:    Bilateral lower extremity edema    Anticoagulated    Hypertensive urgency with joy    Venous insufficiency of both lower extremities    Type 2 diabetes mellitus with stage 3 chronic kidney disease, without long-term current use of insulin (HCC)    Stage 3 chronic kidney disease (HCC)    Partial deafness of both ears    COPD (chronic obstructive pulmonary disease) (HCC)    Hypertensive emergency    Chronic a-fib ([address]  Resolved Problems:    * No resolved hospital problems. *       Plan:          6/9/22    · Patient is feeling somewhat better  · Blood pressure control improved  · Patient did have hypertensive urgency with JOY superimposed on CKD  · Proteinuria  · Leg edema  · No CHF  · But does have underlying COPD   Antihypertensive treatment being adjusted  Gentle hydration  Creatinine 1.81 today from 1.92 yesterday   . Hospital Problems           Last Modified POA    * (Principal) Acute kidney injury superimposed on chronic kidney disease ([address] 6/8/2022 Yes    Bilateral lower extremity edema 6/8/2022 Yes    Anticoagulated 6/8/2022 Yes    Hypertensive urgency with joy 6/9/2022 Yes    Venous insufficiency of both lower extremities 6/8/2022 Yes    Type 2 diabetes mellitus with stage 3 chronic kidney disease, without long-term current use of insulin ([address] 6/8/2022 Yes    Stage 3 chronic kidney disease ([address] 6/8/2022 Yes    Partial deafness of both ears 6/9/2022 Yes    COPD (chronic obstructive pulmonary disease) ([address] 6/8/2022 Yes    Hypertensive emergency 6/8/2022 Yes    Chronic a-fib ([address] 6/8/2022 Yes                         Thanks for consulting us . Will monitor vitals and clinical course , and  Optimize therapy  as needed .            Doretha Delcid MD

## 2022-06-09 NOTE — CARE COORDINATION
[facility] NOTIFICATION:    [facility] notified of admission.     Notification ID:   [id number]    Electronically signed by Miryam Carbone RN on 6/9/2022 at 11:38 AM

## 2022-06-10 LAB
ABSOLUTE EOS #: 0.1 K/UL (ref 0–0.4)
ABSOLUTE LYMPH #: 0.8 K/UL (ref 1–4.8)
ABSOLUTE MONO #: 0.5 K/UL (ref 0.1–1.3)
ANION GAP SERPL CALCULATED.3IONS-SCNC: 10 MMOL/L (ref 9–17)
BASOPHILS # BLD: 0 % (ref 0–2)
BASOPHILS ABSOLUTE: 0 K/UL (ref 0–0.2)
BUN BLDV-MCNC: 65 MG/DL (ref 8–23)
CALCIUM SERPL-MCNC: 8.7 MG/DL (ref 8.6–10.4)
CHLORIDE BLD-SCNC: 109 MMOL/L (ref 98–107)
CO2: 23 MMOL/L (ref 20–31)
CREAT SERPL-MCNC: 1.66 MG/DL (ref 0.5–0.9)
EOSINOPHILS RELATIVE PERCENT: 2 % (ref 0–4)
GFR AFRICAN AMERICAN: 37 ML/MIN
GFR NON-AFRICAN AMERICAN: 30 ML/MIN
GFR SERPL CREATININE-BSD FRML MDRD: ABNORMAL ML/MIN/{1.73_M2}
GLUCOSE BLD-MCNC: 106 MG/DL (ref 70–99)
HCT VFR BLD CALC: 42 % (ref 36–46)
HEMOGLOBIN: 13.3 G/DL (ref 12–16)
LYMPHOCYTES # BLD: 9 % (ref 24–44)
MCH RBC QN AUTO: 30.1 PG (ref 26–34)
MCHC RBC AUTO-ENTMCNC: 31.7 G/DL (ref 31–37)
MCV RBC AUTO: 95 FL (ref 80–100)
MONOCYTES # BLD: 6 % (ref 1–7)
PDW BLD-RTO: 17 % (ref 11.5–14.9)
PLATELET # BLD: 276 K/UL (ref 150–450)
PMV BLD AUTO: 7.6 FL (ref 6–12)
POTASSIUM SERPL-SCNC: 4.4 MMOL/L (ref 3.7–5.3)
RBC # BLD: 4.42 M/UL (ref 4–5.2)
SEG NEUTROPHILS: 83 % (ref 36–66)
SEGMENTED NEUTROPHILS ABSOLUTE COUNT: 7.4 K/UL (ref 1.3–9.1)
SODIUM BLD-SCNC: 142 MMOL/L (ref 135–144)
WBC # BLD: 9 K/UL (ref 3.5–11)

## 2022-06-10 PROCEDURE — 99225 PR SBSQ OBSERVATION CARE/DAY 25 MINUTES: CPT | Performed by: INTERNAL MEDICINE

## 2022-06-10 PROCEDURE — 6370000000 HC RX 637 (ALT 250 FOR IP): Performed by: NURSE PRACTITIONER

## 2022-06-10 PROCEDURE — 2580000003 HC RX 258: Performed by: NURSE PRACTITIONER

## 2022-06-10 PROCEDURE — G0378 HOSPITAL OBSERVATION PER HR: HCPCS

## 2022-06-10 PROCEDURE — 85025 COMPLETE CBC W/AUTO DIFF WBC: CPT

## 2022-06-10 PROCEDURE — 6370000000 HC RX 637 (ALT 250 FOR IP): Performed by: INTERNAL MEDICINE

## 2022-06-10 PROCEDURE — 80048 BASIC METABOLIC PNL TOTAL CA: CPT

## 2022-06-10 PROCEDURE — 36415 COLL VENOUS BLD VENIPUNCTURE: CPT

## 2022-06-10 PROCEDURE — 6360000002 HC RX W HCPCS: Performed by: STUDENT IN AN ORGANIZED HEALTH CARE EDUCATION/TRAINING PROGRAM

## 2022-06-10 RX ORDER — DOXAZOSIN MESYLATE 1 MG/1
2 TABLET ORAL DAILY
Status: DISCONTINUED | OUTPATIENT
Start: 2022-06-10 | End: 2022-06-11 | Stop reason: HOSPADM

## 2022-06-10 RX ORDER — AMLODIPINE BESYLATE 10 MG/1
10 TABLET ORAL NIGHTLY
Status: DISCONTINUED | OUTPATIENT
Start: 2022-06-10 | End: 2022-06-11 | Stop reason: HOSPADM

## 2022-06-10 RX ADMIN — Medication 400 MG: at 08:52

## 2022-06-10 RX ADMIN — METOPROLOL TARTRATE 50 MG: 50 TABLET, FILM COATED ORAL at 08:51

## 2022-06-10 RX ADMIN — CETIRIZINE HYDROCHLORIDE 5 MG: 5 TABLET, FILM COATED ORAL at 08:54

## 2022-06-10 RX ADMIN — APIXABAN 2.5 MG: 2.5 TABLET, FILM COATED ORAL at 08:51

## 2022-06-10 RX ADMIN — Medication 400 MG: at 20:54

## 2022-06-10 RX ADMIN — AMIODARONE HYDROCHLORIDE 200 MG: 200 TABLET ORAL at 08:51

## 2022-06-10 RX ADMIN — HYDRALAZINE HYDROCHLORIDE 50 MG: 50 TABLET, FILM COATED ORAL at 08:52

## 2022-06-10 RX ADMIN — HYDRALAZINE HYDROCHLORIDE 20 MG: 20 INJECTION INTRAMUSCULAR; INTRAVENOUS at 11:41

## 2022-06-10 RX ADMIN — METOPROLOL TARTRATE 50 MG: 50 TABLET, FILM COATED ORAL at 20:54

## 2022-06-10 RX ADMIN — APIXABAN 2.5 MG: 2.5 TABLET, FILM COATED ORAL at 20:54

## 2022-06-10 RX ADMIN — ACETAMINOPHEN 650 MG: 325 TABLET ORAL at 18:40

## 2022-06-10 RX ADMIN — ANTI-FUNGAL POWDER MICONAZOLE NITRATE TALC FREE: 1.42 POWDER TOPICAL at 08:54

## 2022-06-10 RX ADMIN — FERROUS SULFATE TAB 325 MG (65 MG ELEMENTAL FE) 325 MG: 325 (65 FE) TAB at 08:52

## 2022-06-10 RX ADMIN — ATORVASTATIN CALCIUM 40 MG: 40 TABLET, FILM COATED ORAL at 08:51

## 2022-06-10 RX ADMIN — DOXAZOSIN 2 MG: 1 TABLET ORAL at 14:37

## 2022-06-10 RX ADMIN — ANTI-FUNGAL POWDER MICONAZOLE NITRATE TALC FREE: 1.42 POWDER TOPICAL at 20:54

## 2022-06-10 RX ADMIN — ACETAMINOPHEN 650 MG: 325 TABLET ORAL at 11:32

## 2022-06-10 RX ADMIN — MULTIPLE VITAMINS W/ MINERALS TAB 1 TABLET: TAB at 08:51

## 2022-06-10 RX ADMIN — HYDRALAZINE HYDROCHLORIDE 75 MG: 50 TABLET, FILM COATED ORAL at 14:38

## 2022-06-10 RX ADMIN — SODIUM CHLORIDE, PRESERVATIVE FREE 10 ML: 5 INJECTION INTRAVENOUS at 20:54

## 2022-06-10 ASSESSMENT — PAIN SCALES - GENERAL
PAINLEVEL_OUTOF10: 3
PAINLEVEL_OUTOF10: 4
PAINLEVEL_OUTOF10: 0

## 2022-06-10 ASSESSMENT — PAIN DESCRIPTION - DESCRIPTORS
DESCRIPTORS: DULL
DESCRIPTORS: DULL

## 2022-06-10 ASSESSMENT — PAIN DESCRIPTION - LOCATION
LOCATION: HEAD
LOCATION: HEAD

## 2022-06-10 ASSESSMENT — PAIN - FUNCTIONAL ASSESSMENT
PAIN_FUNCTIONAL_ASSESSMENT: ACTIVITIES ARE NOT PREVENTED
PAIN_FUNCTIONAL_ASSESSMENT: ACTIVITIES ARE NOT PREVENTED

## 2022-06-10 ASSESSMENT — PAIN DESCRIPTION - ORIENTATION: ORIENTATION: RIGHT;POSTERIOR

## 2022-06-10 NOTE — PLAN OF CARE
Problem: Discharge Planning  Goal: Discharge to home or other facility with appropriate resources  6/10/2022 1604 by Vickie Montiel RN  Outcome: Progressing  Note: Plan is for patient to discharge back home. 6/10/2022 0507 by Tad Epstein RN  Outcome: Progressing     Problem: Pain  Goal: Verbalizes/displays adequate comfort level or baseline comfort level  6/10/2022 1604 by Vickie Montiel RN  Outcome: Progressing  6/10/2022 0507 by Tad Epstein RN  Outcome: Progressing     Problem: ABCDS Injury Assessment  Goal: Absence of physical injury  6/10/2022 1604 by Vickie Montiel RN  Outcome: Progressing  6/10/2022 0507 by Tad Epstein RN  Outcome: Progressing     Problem: Skin/Tissue Integrity  Goal: Absence of new skin breakdown  Description: 1. Monitor for areas of redness and/or skin breakdown  2. Assess vascular access sites hourly  3. Every 4-6 hours minimum:  Change oxygen saturation probe site  4. Every 4-6 hours:  If on nasal continuous positive airway pressure, respiratory therapy assess nares and determine need for appliance change or resting period. 6/10/2022 1604 by Vickie Montiel RN  Outcome: Progressing  6/10/2022 0507 by Tad Epstein RN  Outcome: Progressing     Problem: Safety - Adult  Goal: Free from fall injury  6/10/2022 1604 by Vickie Montiel RN  Outcome: Progressing  Note: Patient is alert and oriented.   6/10/2022 0507 by Tad Epstein RN  Outcome: Progressing     Problem: Chronic Conditions and Co-morbidities  Goal: Patient's chronic conditions and co-morbidity symptoms are monitored and maintained or improved  6/10/2022 1604 by Vickie Montiel RN  Outcome: Progressing  6/10/2022 0507 by Tad Epstein RN  Outcome: Progressing     Problem: Skin/Tissue Integrity - Adult  Goal: Incisions, wounds, or drain sites healing without S/S of infection  6/10/2022 1604 by Vickie Montiel RN  Outcome: Progressing  6/10/2022 0507 by Tad Epstein RN  Outcome: Progressing     Problem: Musculoskeletal - Adult  Goal: Return mobility to safest level of function  6/10/2022 1604 by Miguel Man RN  Outcome: Progressing  6/10/2022 0507 by Alee Johnston RN  Outcome: Progressing     Problem: Infection - Adult  Goal: Absence of infection at discharge  6/10/2022 1604 by Miguel Man RN  Outcome: Progressing  6/10/2022 0507 by Alee Johnston RN  Outcome: Progressing

## 2022-06-10 NOTE — CARE COORDINATION
ONGOING DISCHARGE PLAN:    Spoke with patient's daughter, Misael Tello, regarding discharge plan and she confirms that plan is still home. Misael Tello states pt refuses VNS and SNF. Cre 1.66 today. Anticipate home today. Will continue to follow for additional discharge needs.     Electronically signed by Remington Connolly RN on 6/10/2022 at 11:32 AM

## 2022-06-10 NOTE — PROGRESS NOTES
NEPHROLOGY progress NOTE    Patient :  Rafaela Robertson; 66 y.o. MRN# [id number]  Location:  2057/2057-01  Attending:  Maddie John MD  Admit Date:  6/7/2022   Hospital Day: 0      Reason for Consult: Acute kidney injury      Chief Complaint: Worsening kidney function, abnormal labs    Subjective/interval history:  Patient seen and examined denies any new complaints no acute events overnight. BUN/creatinine  improved creatinine   Blood pressure improved    History of Present Illness:     This is a 66 y.o. female  with past medical history of coronary artery disease status post coronary artery stent placement in 2000, history of CABG in 2003, history of type 2 diabetes since 2000 with mild nonproliferative retinopathy, CHF, chronic kidney disease stage IIIb with baseline creatinine 1.4 to 1.7 mg/dL, patient serum creatinine has been trending up and peaked to 2.2 mg/dL with BUN of 100 and therefore patient was sent to the hospital for further evaluation and management  Diuretics were held and patient was started on IV fluids  Patient denies any significant complaint no chest pain shortness of breath patient does have peripheral edema  Patient has nephrotic range proteinuria UPC of 20 g most likely patient has nephrotic syndrome  Patient refused to have kidney biopsy, but suspected cause of nephrotic syndrome is diabetic nephropathy prior investigation showed YRIS ANCA negative Kappa lambda ratio were within normal limits  Blood pressure uncontrolled  2D echo 2/6/2022 showed EF of greater than 55% moderate left ventricular hypertrophy, mild aortic stenosis mild to moderate aortic insufficiency    Past Medical History:        Diagnosis Date    Acute CVA (cerebrovascular accident) ([facility] [address] 7/25/2020    Altered mental status 5/26/2021    Arthritis     Brain mass     Cellulitis and abscess     Cellulitis and abscess of unspecified site     Cellulitis of both lower extremities 5/26/2021    Cellulitis of left lower extremity 7/26/2020    Cellulitis of lower extremity 10/4/2020    CHF (congestive heart failure) (HCC)     Chronic kidney disease, unspecified     Coronary atherosclerosis of native coronary artery     Essential hypertension 7/25/2020    Essential hypertension, malignant     Hallucinations 2/18/2021    Hard of hearing     Head contusion 9/9/2020    Hypokalemia 9/9/2020    Iron deficiency anemia, unspecified     Meningioma ([facility] [address] 2/25/2021    Myocardial infarction ([facility] [address]     Other and unspecified hyperlipidemia     Pure hypercholesterolemia     Toxic metabolic encephalopathy 6/02/7537    Type II or unspecified type diabetes mellitus without mention of complication, not stated as uncontrolled     Unspecified asthma(493.90)     Unspecified venous (peripheral) insufficiency     Unspecified vitamin D deficiency     UTI (urinary tract infection) 2/18/2021       Past Surgical History:        Procedure Laterality Date    COLONOSCOPY N/A 3/15/2022    COLONOSCOPY DIAGNOSTIC performed by Dang Law MD at [facility].      x 3, Dr. Violet Harding  3/7/2022         THORACENTESIS  3/10/2022         TONSILLECTOMY AND ADENOIDECTOMY      UPPER GASTROINTESTINAL ENDOSCOPY N/A 3/15/2022    EGD BIOPSY performed by Dang Law MD at [address] [place] [address]       Current Medications:    amLODIPine (NORVASC) tablet 10 mg, Nightly  hydrALAZINE (APRESOLINE) tablet 75 mg, TID  doxazosin (CARDURA) tablet 2 mg, Daily  sodium chloride flush 0.9 % injection 5-40 mL, 2 times per day  sodium chloride flush 0.9 % injection 5-40 mL, PRN  0.9 % sodium chloride infusion, PRN  ondansetron (ZOFRAN-ODT) disintegrating tablet 4 mg, Q8H PRN   Or  ondansetron (ZOFRAN) injection 4 mg, Q6H PRN  acetaminophen (TYLENOL) tablet 650 mg, Q6H PRN   Or  acetaminophen (TYLENOL) suppository 650 mg, Q6H PRN  albuterol (PROVENTIL) nebulizer solution 2.5 mg, Q6H PRN  albuterol sulfate HFA (PROVENTIL;VENTOLIN;PROAIR) 108 (90 Base) MCG/ACT inhaler 2 puff, Q6H PRN  [Held by provider] allopurinol (ZYLOPRIM) tablet 300 mg, Daily  amiodarone (CORDARONE) tablet 200 mg, Daily  apixaban (ELIQUIS) tablet 2.5 mg, BID  atorvastatin (LIPITOR) tablet 40 mg, Daily  cetirizine (ZYRTEC) tablet 5 mg, Daily  ferrous sulfate (IRON 325) tablet 325 mg, Daily with breakfast  magnesium oxide (MAG-OX) tablet 400 mg, BID  miconazole (MICOTIN) 2 % powder, BID  therapeutic multivitamin-minerals 1 tablet, Daily  saline nasal gel (AYR), PRN  metoprolol tartrate (LOPRESSOR) tablet 50 mg, BID  hydrALAZINE (APRESOLINE) injection 20 mg, Q6H PRN        Allergies:  Latex, Aleve [naproxen sodium], Pioglitazone, Claritin [loratadine], Keflex [cephalexin], and Lisinopril    Social History:   Social History     Socioeconomic History    Marital status:      Spouse name: Not on file    Number of children: 10    Years of education: Not on file    Highest education level: Not on file   Occupational History    Not on file   Tobacco Use    Smoking status: Former Smoker     Packs/day: 0.25     Years: 10.00     Pack years: 2.50     Quit date: 2000     Years since quittin.4    Smokeless tobacco: Never Used   Vaping Use    Vaping Use: Never used   Substance and Sexual Activity    Alcohol use: Not Currently     Alcohol/week: 0.0 standard drinks    Drug use: No    Sexual activity: Yes     Partners: Female   Other Topics Concern    Not on file   Social History Narrative    Not on file     Social Determinants of Health     Financial Resource Strain: Low Risk     Difficulty of Paying Living Expenses: Not very hard   Food Insecurity: No Food Insecurity    Worried About Running Out of Food in the Last Year: Never true    Holden of Food in the Last Year: Never true   Transportation Needs:     Lack of Transportation (Medical): Not on file    Lack of Transportation (Non-Medical):  Not on file   Physical Activity:     Days of Exercise per Week: Not on file    Minutes of Exercise per Session: Not on file   Stress:     Feeling of Stress : Not on file   Social Connections:     Frequency of Communication with Friends and Family: Not on file    Frequency of Social Gatherings with Friends and Family: Not on file    Attends Oriental orthodox Services: Not on file    Active Member of Clubs or Organizations: Not on file    Attends Club or Organization Meetings: Not on file    Marital Status: Not on file   Intimate Partner Violence:     Fear of Current or Ex-Partner: Not on file    Emotionally Abused: Not on file    Physically Abused: Not on file    Sexually Abused: Not on file   Housing Stability:     Unable to Pay for Housing in the Last Year: Not on file    Number of [place] in the Last Year: Not on file    Unstable Housing in the Last Year: Not on file             Objective:  CURRENT TEMPERATURE:  Temp: 97.3 °F (36.3 °C)  MAXIMUM TEMPERATURE OVER 24HRS:  Temp (24hrs), Av.4 °F (36.3 °C), Min:97.3 °F (36.3 °C), Max:98 °F (36.7 °C)    CURRENT RESPIRATORY RATE:  Resp: 20  CURRENT PULSE:  Heart Rate: 60  CURRENT BLOOD PRESSURE:  BP: (!) 141/48  24HR BLOOD PRESSURE RANGE:  Systolic (91AWD), XZW:831 , Min:141 , LUIS:964   ; Diastolic (93ISK), YRS:66, Min:48, Max:78    24HR INTAKE/OUTPUT:      Intake/Output Summary (Last 24 hours) at 6/10/2022 1430  Last data filed at 6/10/2022 0846  Gross per 24 hour   Intake 767.95 ml   Output 250 ml   Net 517.95 ml     Patient Vitals for the past 96 hrs (Last 3 readings):   Weight   22 0516 156 lb 4.9 oz (70.9 kg)       Physical Exam:  GENERAL APPEARANCE: Alert and cooperative, and appears to be in no acute distress. HEAD: normocephalic  EYES:  EOMI. Not pale, anicteric   NOSE:  No nasal discharge. THROAT:  Oral cavity and pharynx normal. Moist  CARDIAC: Regular rhythm  LUNGS: Normal respiratory effort no accessory muscle use  NECK: Neck supple, non-tender     MUSKULOSKELETAL: Adequately aligned spine.  No joint erythema or tenderness. EXTREMITIES++ edema. Peripheral pulses intact. NEURO: Nonfocal      Labs:   CBC:  Recent Labs     06/08/22  1220 06/09/22  0600 06/10/22  0622   WBC 8.4 8.1 9.0   RBC 4.25 4.04 4.42   HGB 12.8 12.5 13.3   HCT 39.8 38.3 42.0   MCV 93.5 95.0 95.0   MCH 30.2 30.9 30.1   MCHC 32.3 32.6 31.7   RDW 16.5* 16.8* 17.0*    256 276   MPV 7.5 7.5 7.6      BMP:   Recent Labs     06/08/22  1220 06/09/22  0600 06/10/22  0622    140 142   K 4.2 4.4 4.4    107 109*   CO2 23 26 23   BUN 83* 84* 65*   CREATININE 1.92* 1.81* 1.66*   GLUCOSE 113* 112* 106*   CALCIUM 8.4* 8.5* 8.7      Phosphorus:  No results for input(s): PHOS in the last 72 hours. Magnesium:   Recent Labs     06/07/22  2145 06/09/22  0600   MG 2.4 2.3       Urine Sodium:    Recent Labs     06/08/22 2152   SLOAN <20      Urine Potassium: No results for input(s): KUR in the last 72 hours. Urine Chloride:  No results for input(s): CLU in the last 72 hours. Urine Ph:  Invalid input(s): PO4U  Urine Osmolarity: No results for input(s): OSMOU in the last 72 hours. Urine Creatinine:    Recent Labs     06/08/22 2152   LABCREA 63.0     Urine Eosinophils: Invalid input(s): EOSU  Urine Protein:  No results for input(s): TPU in the last 72 hours. Urinalysis:    Recent Labs     06/08/22 2152   NITRU POSITIVE*   COLORU Yellow   PHUR 7.5   WBCUA 3 to 5   RBCUA 0 TO 2   BACTERIA MANY*   SPECGRAV 1.022   LEUKOCYTESUR SMALL*   UROBILINOGEN Normal   BILIRUBINUR NEGATIVE   GLUCOSEU TRACE*   [address]         Radiology:  Reviewed as available. Assessment:  1. Acute kidney injury on CKD, most likely secondary to prerenal azotemia due to diuretic use and third spacing, due to hypoalbuminemia secondary to nephrotic syndrome, serum creatinine improved to 1.6 mg/dL which is near baseline  2. Peripheral edema nephrotic syndrome hypoalbuminemia  3.  CKD stage IIIb Baseline creatinine 1.4 to 1.8 mg/dL, with nephrotic syndrome and nephrotic range proteinuria UPC 20 g, most likely secondary to diabetic nephropathy  4. CHF with preserved EF mild LVH diastolic dysfunction  5. Hypertension suboptimally controlled       Plan:  1. DC IV fluids  2.   hydralazine increased to 75 mg 3 times daily, metoprolol 50 mg twice a day  3. Increase amlodipine 10 mg daily  4. Resume Bumex 0.5 mg daily on discharge    No objection on discharge from nephrology standpoint        Thank you for the consultation.       Electronically signed by Neela Corona MD on 6/10/2022 at 2:30 PM

## 2022-06-10 NOTE — PLAN OF CARE
Problem: Discharge Planning  Goal: Discharge to home or other facility with appropriate resources  6/10/2022 0507 by Emelyn Cates RN  Outcome: Progressing     Problem: Pain  Goal: Verbalizes/displays adequate comfort level or baseline comfort level  6/10/2022 0507 by Emelyn Cates RN  Outcome: Progressing     Problem: ABCDS Injury Assessment  Goal: Absence of physical injury  6/10/2022 0507 by Emelyn Cates RN  Outcome: Progressing     Problem: Skin/Tissue Integrity  Goal: Absence of new skin breakdown  Description: 1. Monitor for areas of redness and/or skin breakdown  2. Assess vascular access sites hourly  3. Every 4-6 hours minimum:  Change oxygen saturation probe site  4. Every 4-6 hours:  If on nasal continuous positive airway pressure, respiratory therapy assess nares and determine need for appliance change or resting period.   6/10/2022 0507 by Emelyn Cates RN  Outcome: Progressing     Problem: Safety - Adult  Goal: Free from fall injury  6/10/2022 0507 by Emelyn Cates RN  Outcome: Progressing     Problem: Chronic Conditions and Co-morbidities  Goal: Patient's chronic conditions and co-morbidity symptoms are monitored and maintained or improved  6/10/2022 0507 by Emelyn Cates RN  Outcome: Progressing     Problem: Skin/Tissue Integrity - Adult  Goal: Incisions, wounds, or drain sites healing without S/S of infection  6/10/2022 0507 by Emelyn Cates RN  Outcome: Progressing     Problem: Musculoskeletal - Adult  Goal: Return mobility to safest level of function  6/10/2022 0507 by Emelyn Cates RN  Outcome: Progressing     Problem: Infection - Adult  Goal: Absence of infection at discharge  6/10/2022 0507 by Emelyn Cates RN  Outcome: Progressing

## 2022-06-10 NOTE — PROGRESS NOTES
[facility] hospital Internal Medicine    Progress Note     6/10/2022    3:23 PM    Name:   Avni Hathaway  MRN:     [id number]     Acct:      [de-identified]   Room:   2057/2057-01  IP Day:  0  Admit Date:  6/7/2022  7:39 PM    PCP:   Annalee Granda MD  Code Status:  Full Code    Subjective:     C/C:   Chief Complaint   Patient presents with    Abnormal Lab     Principal Problem:    Acute kidney injury superimposed on chronic kidney disease ([facility] [address]  Active Problems:    Bilateral lower extremity edema    Anticoagulated    Hypertensive urgency with joy    Venous insufficiency of both lower extremities    Type 2 diabetes mellitus with stage 3 chronic kidney disease, without long-term current use of insulin ([facility] [address]    Stage 3 chronic kidney disease (HCC)    Partial deafness of both ears    COPD (chronic obstructive pulmonary disease) ([address]    JOY (acute kidney injury) ([facility] [address]    Chronic a-fib ([facility] [address]  Resolved Problems:    * No resolved hospital problems. *       BP improved . Not in CHF   joy on ckd , proteinuria   Creatinine mildly improved     BMP:   Recent Labs     06/09/22  0600 06/10/22  0622    142   K 4.4 4.4   CO2 26 23   BUN 84* 65*   CREATININE 1.81* 1.66*   LABGLOM 27* 30*   GLUCOSE 112* 106*                 nephroloy input noted  Assessment:  1. Acute kidney injury on CKD, most likely secondary to prerenal azotemia due to diuretic use and third spacing, due to hypoalbuminemia secondary to nephrotic syndrome  2. Peripheral edema nephrotic syndrome hypoalbuminemia  3. CKD stage IIIb Baseline creatinine 1.4 to 1.8 mg/dL, with nephrotic syndrome and nephrotic range proteinuria UPC 20 g, most likely secondary to diabetic nephropathy  4. CHF with preserved EF mild LVH diastolic dysfunction  5. Hypertension suboptimally controlled        Plan:  1. Hold diuretics  2. Gentle hydration IV fluid trial  3. Increase hydralazine 50 mg 3 times daily, metoprolol 50 mg twice a day  4.  And amlodipine 5 mg nightly  5. IV hydralazine as needed     On admission  The patient is a 66 y.o.  female, with a history of chronic combined CHF, asthma, cellulitis, paroxysmal A. fib, HTN, lymphedema, CKD stage III, and diabetes mellitus type II, who presents with normal lab. According to patient, she had routine lab work completed earlier today and received a phone call from her nephrologist, Dr. Raymondo Galeazzi, instructing her to come to the ED due to elevated BUN and creatinine. Symptoms are associated with edema in bilateral lower extremities, which are currently wrapped with Unna boots. Patient denies further complaints and states she wants to be discharged home first thing in the morning. Denies fever, chills, chest pain, cough, abdominal pain, nausea, vomiting, diarrhea, and urinary symptoms. Denies changes in urination, states that she is not going any less than usual.  There are no aggravating or alleviating factors. Patient's Bumex was recently discontinued and started on hydralazine; however, BUN and creatinine have increased despite discontinuation of diuretic. Denies changes in p.o. intake. Symptoms are reported as mild and unchanged from baseline.      Significant last 24 hr data reviewed ;   Vitals:    06/10/22 0851 06/10/22 1132 06/10/22 1251 06/10/22 1430   BP: (!) 177/49 (!) 189/52 (!) 141/48 (!) 165/58   Pulse: 66 58 60 61   Resp:  20 20    Temp:  97.3 °F (36.3 °C) 97.3 °F (36.3 °C)    TempSrc:  Axillary     SpO2:  96% 99%    Weight:          Recent Results (from the past 24 hour(s))   CBC with Auto Differential    Collection Time: 06/10/22  6:22 AM   Result Value Ref Range    WBC 9.0 3.5 - 11.0 k/uL    RBC 4.42 4.0 - 5.2 m/uL    Hemoglobin 13.3 12.0 - 16.0 g/dL    Hematocrit 42.0 36 - 46 %    MCV 95.0 80 - 100 fL    MCH 30.1 26 - 34 pg    MCHC 31.7 31 - 37 g/dL    RDW 17.0 (H) 11.5 - 14.9 %    Platelets 206 364 - 381 k/uL    MPV 7.6 6.0 - 12.0 fL    Seg Neutrophils 83 (H) 36 - 66 % Lymphocytes 9 (L) 24 - 44 %    Monocytes 6 1 - 7 %    Eosinophils % 2 0 - 4 %    Basophils 0 0 - 2 %    Segs Absolute 7.40 1.3 - 9.1 k/uL    Absolute Lymph # 0.80 (L) 1.0 - 4.8 k/uL    Absolute Mono # 0.50 0.1 - 1.3 k/uL    Absolute Eos # 0.10 0.0 - 0.4 k/uL    Basophils Absolute 0.00 0.0 - 0.2 k/uL   Basic Metabolic Panel w/ Reflex to MG    Collection Time: 06/10/22  6:22 AM   Result Value Ref Range    Glucose 106 (H) 70 - 99 mg/dL    BUN 65 (H) 8 - 23 mg/dL    CREATININE 1.66 (H) 0.50 - 0.90 mg/dL    Calcium 8.7 8.6 - 10.4 mg/dL    Sodium 142 135 - 144 mmol/L    Potassium 4.4 3.7 - 5.3 mmol/L    Chloride 109 (H) 98 - 107 mmol/L    CO2 23 20 - 31 mmol/L    Anion Gap 10 9 - 17 mmol/L    GFR Non-African American 30 (L) >60 mL/min    GFR  37 (L) >60 mL/min    GFR Comment           No results for input(s): POCGLU in the last 72 hours. XR CHEST (2 VW)    Result Date: 6/7/2022  EXAMINATION: TWO XRAY VIEWS OF THE CHEST 6/7/2022 9:38 pm COMPARISON: Chest 05/18/2022 HISTORY: ORDERING SYSTEM PROVIDED HISTORY: eval pulm edema TECHNOLOGIST PROVIDED HISTORY: eval pulm edema Reason for Exam: PT CO mild cough with SOB and fatigue X several days. FINDINGS: The cardiac silhouette is enlarged. Calcifications involving the aorta reflect atherosclerosis. The mediastinal and hilar silhouettes appear unremarkable. Chronic appearing coarse interstitial densities predominantly parahilar regions and lower lungs. Chronic appearing coarse interstitial densities predominate perihilar regions and lung bases, typical of sequela from smoking or other previous infectious/inflammatory process. No dense consolidation or pleural effusion evident. No pneumothorax is seen. No acute osseous abnormality is identified. Sequela from prior median sternotomy. Hyperkyphosis and barrel chest configuration. Bones appear osteoporotic. 1. No definite acute pulmonary disease.  2.  Pulmonary sequela typical of that seen with smoking, including possible COPD; correlate with clinical history. 3. Calcific atherosclerosis aorta. 4. Cardiomegaly. On admission         Review of Systems:     Constitutional:  negative for chills, fevers, sweats  Respiratory:  negative for cough, dyspnea on exertion, hemoptysis, shortness of breath, wheezing  Cardiovascular:  negative for chest pain, chest pressure/discomfort, lower extremity edema, palpitations  Gastrointestinal:  negative for abdominal pain, constipation, diarrhea, nausea, vomiting  Neurological:  negative for dizziness, headache  Data:     Past Medical History:  no change     Social History:  no change    Family History: @no change    Vitals:      I/O (24Hr): Intake/Output Summary (Last 24 hours) at 6/10/2022 1523  Last data filed at 6/10/2022 0846  Gross per 24 hour   Intake 767.95 ml   Output 250 ml   Net 517.95 ml       Labs:    Radiology:    Medications:      Allergies:      Current Meds:   Scheduled Meds:    amLODIPine  10 mg Oral Nightly    hydrALAZINE  75 mg Oral TID    doxazosin  2 mg Oral Daily    sodium chloride flush  5-40 mL IntraVENous 2 times per day    [Held by provider] allopurinol  300 mg Oral Daily    amiodarone  200 mg Oral Daily    apixaban  2.5 mg Oral BID    atorvastatin  40 mg Oral Daily    cetirizine  5 mg Oral Daily    ferrous sulfate  325 mg Oral Daily with breakfast    magnesium oxide  400 mg Oral BID    miconazole   Topical BID    therapeutic multivitamin-minerals  1 tablet Oral Daily    metoprolol tartrate  50 mg Oral BID     Continuous Infusions:    sodium chloride       PRN Meds: sodium chloride flush, sodium chloride, ondansetron **OR** ondansetron, acetaminophen **OR** acetaminophen, albuterol, albuterol sulfate HFA, saline nasal gel, hydrALAZINE      Physical Examination:        BP (!) 165/58   Pulse 61   Temp 97.3 °F (36.3 °C)   Resp 20   Wt 156 lb 4.9 oz (70.9 kg)   SpO2 99%   BMI 31.57 kg/m²   Temp (24hrs), Av.4 °F (36.3 °C), Min:97.3 °F (36.3 °C), Max:98 °F (36.7 °C)    No results for input(s): POCGLU in the last 72 hours. Intake/Output Summary (Last 24 hours) at 6/10/2022 1523  Last data filed at 6/10/2022 0846  Gross per 24 hour   Intake 767.95 ml   Output 250 ml   Net 517.95 ml       General Appearance:  alert, well appearing, and in no acute distress  Mental status:   Head:  normocephalic, atraumatic. Eye: no icterus, redness, pupils equal and reactive, extraocular eye movements intact, conjunctiva clear  Ear: normal external ear, no discharge, hearing intact  Nose:  no drainage noted  Mouth: mucous membranes moist  Neck: supple, no carotid bruits, thyroid not palpable  Lungs: Bilateral equal air entry, clear to ausculation, no wheezing, rales or rhonchi, normal effort  Cardiovascular: normal rate, regular rhythm, no murmur, gallop, rub. Abdomen: Soft, nontender, nondistended, normal bowel sounds, no hepatomegaly or splenomegaly  Neurologic: There are no new focal motor or sensory deficits,   Skin: No gross lesions, rashes, bruising or bleeding on exposed skin area  Extremities:  peripheral pulses palpable, no pedal edema or calf pain with palpation  Psych:             Assessment:        Primary Problem  Acute kidney injury superimposed on chronic kidney disease (HCC)    Principal Problem:    Acute kidney injury superimposed on chronic kidney disease ([facility] [address]  Active Problems:    Bilateral lower extremity edema    Anticoagulated    Hypertensive urgency with joy    Venous insufficiency of both lower extremities    Type 2 diabetes mellitus with stage 3 chronic kidney disease, without long-term current use of insulin (HCC)    Stage 3 chronic kidney disease (HCC)    Partial deafness of both ears    COPD (chronic obstructive pulmonary disease) (HCC)    JOY (acute kidney injury) (HCC)    Chronic a-fib (HCC)  Resolved Problems:    * No resolved hospital problems.  *       Plan:          6/9/22    · Patient is feeling somewhat better  · Blood pressure control improved  · Patient did have hypertensive urgency with JOY superimposed on CKD  · Proteinuria  · Leg edema  · No CHF  · But does have underlying COPD   Antihypertensive treatment being adjusted  Gentle hydration  Creatinine 1.81 today from 1.92 yesterday    6/10/22  Optimize med rx for HTN   . Hospital Problems           Last Modified POA    * (Principal) Acute kidney injury superimposed on chronic kidney disease ([address] 6/8/2022 Yes    Bilateral lower extremity edema 6/8/2022 Yes    Anticoagulated 6/8/2022 Yes    Hypertensive urgency with joy 6/9/2022 Yes    Venous insufficiency of both lower extremities 6/8/2022 Yes    Type 2 diabetes mellitus with stage 3 chronic kidney disease, without long-term current use of insulin ([address] 6/8/2022 Yes    Stage 3 chronic kidney disease ([address] 6/8/2022 Yes    Partial deafness of both ears 6/9/2022 Yes    COPD (chronic obstructive pulmonary disease) ([address] 6/8/2022 Yes    JOY (acute kidney injury) ([address] 6/9/2022 Yes    Chronic a-fib ([address] 6/8/2022 Yes                         Thanks for consulting us . Will monitor vitals and clinical course , and  Optimize therapy  as needed .            Pepper Ospina MD

## 2022-06-10 NOTE — DISCHARGE SUMMARY
[place] [address] Internal Medicine    Discharge Summary     Patient ID: Zack Valero  :     MRN: [id number]     ACCOUNT:  [de-identified]   Patient's PCP: Ana Lai MD  Admit Date: 2022   Discharge Date: 6/10/2022    Length of Stay: 0  Code Status:  Full Code  Admitting Physician: Narcisa Doty MD  Discharge Physician: Narcisa Doty MD     Active Discharge Diagnoses:     Primary Problem  Acute kidney injury superimposed on chronic kidney disease [facility] Problems    Diagnosis Date Noted    Hypertensive urgency with joy [I16.0] 2022     Priority: Medium    Acute kidney injury superimposed on chronic kidney disease ([address] [N17.9, N18.9] 2022     Priority: Medium    Anticoagulated [Z79.01] 2022     Priority: Medium    Bilateral lower extremity edema [R60.0] 2022     Priority: Medium    Chronic a-fib ([address] [I48.20] 2022    JOY (acute kidney injury) ([address] [N17.9] 01/15/2022    COPD (chronic obstructive pulmonary disease) ([address] [J44.9] 2020    Partial deafness of both ears [H91.93] 2020    Stage 3 chronic kidney disease ([address] [N18.30]     Type 2 diabetes mellitus with stage 3 chronic kidney disease, without long-term current use of insulin ([address] [E11.22, N18.30]     Venous insufficiency of both lower extremities [I87.2]        Admission Condition:  fair     Discharged Condition: fair    Hospital Stay:     Hospital Course:  Zack Valero is a 66 y.o. female who was admitted for the management of Acute kidney injury superimposed on chronic kidney disease ([address] , presented to ER with Abnormal Lab     Principal Problem:    Acute kidney injury superimposed on chronic kidney disease ([address]  Active Problems:    Bilateral lower extremity edema    Anticoagulated    Hypertensive urgency with joy    Venous insufficiency of both lower extremities    Type 2 diabetes mellitus with stage 3 chronic kidney disease, without long-term current use of insulin (HCC)    Stage 3 chronic kidney disease (HCC)    Partial deafness of both ears    COPD (chronic obstructive pulmonary disease) (HCC)    Hypertensive emergency    Chronic a-fib ([facility]  Resolved Problems:    * No resolved hospital problems. *         BP improved . Not in CHF   nyla on ckd , proteinuria   Creatinine mildly improved      BMP:   Recent Labs     06/08/22  1220 06/09/22  0600    140   K 4.2 4.4   CO2 23 26   BUN 83* 84*   CREATININE 1.92* 1.81*   LABGLOM 26* 27*   GLUCOSE 113* 112*                       nephroloy input noted  Assessment:  1. Acute kidney injury on CKD, most likely secondary to prerenal azotemia due to diuretic use and third spacing, due to hypoalbuminemia secondary to nephrotic syndrome  2. Peripheral edema nephrotic syndrome hypoalbuminemia  3. CKD stage IIIb Baseline creatinine 1.4 to 1.8 mg/dL, with nephrotic syndrome and nephrotic range proteinuria UPC 20 g, most likely secondary to diabetic nephropathy  4. CHF with preserved EF mild LVH diastolic dysfunction  5. Hypertension suboptimally controlled        Plan:  1. Hold diuretics  2. Gentle hydration IV fluid trial  3. Increase hydralazine 50 mg 3 times daily, metoprolol 50 mg twice a day  4. And amlodipine 5 mg nightly  5.  IV hydralazine as needed      On admission  The patient is a 71 y.o.  female, with a history of chronic combined CHF, asthma, cellulitis, paroxysmal A. fib, HTN, lymphedema, CKD stage III, and diabetes mellitus type II, who presents with normal lab.  According to patient, she had routine lab work completed earlier today and received a phone call from her nephrologist, Dr. Geovany Nye, instructing her to come to the ED due to elevated BUN and creatinine.  Symptoms are associated with edema in bilateral lower extremities, which are currently wrapped with Unna boots.  Patient denies further complaints and states she wants to be discharged home first thing in the morning.  Denies fever, chills, chest pain, cough, abdominal pain, nausea, vomiting, diarrhea, and urinary symptoms.  Denies changes in urination, states that she is not going any less than usual.  There are no aggravating or alleviating factors.  Patient's Bumex was recently discontinued and started on hydralazine; however, BUN and creatinine have increased despite discontinuation of diuretic.  Denies changes in p.o. intake.  Symptoms are reported as mild and unchanged from baseline.              Significant therapeutic interventions:     Significant Diagnostic Studies:   Labs / Micro:        ,     Radiology:    XR CHEST (2 VW)    Result Date: 6/7/2022  EXAMINATION: TWO XRAY VIEWS OF THE CHEST 6/7/2022 9:38 pm COMPARISON: Chest 05/18/2022 HISTORY: ORDERING SYSTEM PROVIDED HISTORY: eval pulm edema TECHNOLOGIST PROVIDED HISTORY: eval pulm edema Reason for Exam: PT CO mild cough with SOB and fatigue X several days. FINDINGS: The cardiac silhouette is enlarged. Calcifications involving the aorta reflect atherosclerosis. The mediastinal and hilar silhouettes appear unremarkable. Chronic appearing coarse interstitial densities predominantly parahilar regions and lower lungs. Chronic appearing coarse interstitial densities predominate perihilar regions and lung bases, typical of sequela from smoking or other previous infectious/inflammatory process. No dense consolidation or pleural effusion evident. No pneumothorax is seen. No acute osseous abnormality is identified. Sequela from prior median sternotomy. Hyperkyphosis and barrel chest configuration. Bones appear osteoporotic. 1. No definite acute pulmonary disease. 2.  Pulmonary sequela typical of that seen with smoking, including possible COPD; correlate with clinical history. 3. Calcific atherosclerosis aorta. 4. Cardiomegaly.      XR CHEST (2 VW)    Result Date: 5/18/2022  EXAMINATION: TWO XRAY VIEWS OF THE CHEST 5/18/2022 5:14 pm COMPARISON: 03/24/2022 HISTORY: ORDERING SYSTEM PROVIDED HISTORY: ams, chf TECHNOLOGIST PROVIDED HISTORY: ams, chf Reason for Exam: ams, chf FINDINGS: Cardiac size is stable. Stable opacity in the medial right base. The pulmonary vascularity is hazy and indistinct. No pneumothorax. Small bilateral pleural effusions. Prominent naima suggesting pulmonary arterial hypertension. Postsurgical changes overlying the mediastinum. Mild pulmonary vascular congestion Prominent naima suggesting pulmonary arterial hypertension. Consultations:    Consults:     Final Specialist Recommendations/Findings:   IP CONSULT TO NEPHROLOGY  IP CONSULT TO INTERNAL MEDICINE  IP CONSULT TO SPIRITUAL SERVICES      The patient was seen and examined on day of discharge and this discharge summary is in conjunction with any daily progress note from day of discharge. Discharge plan:     Disposition: Home    Physician Follow Up:     No follow-up provider specified.      Requiring Further Evaluation/Follow Up POST HOSPITALIZATION/Incidental Findings:    Diet: cardiac diet    Activity: As tolerated    Instructions to Patient:     Discharge Medications:      Medication List      START taking these medications    amLODIPine 5 MG tablet  Commonly known as: NORVASC  Take 1 tablet by mouth nightly        CHANGE how you take these medications    hydrALAZINE 50 MG tablet  Commonly known as: APRESOLINE  Take 1 tablet by mouth 3 times daily  What changed:   · medication strength  · how much to take        CONTINUE taking these medications    Adjust Fold Cane/York Handle Misc  Use daily for walking     * albuterol (2.5 MG/3ML) 0.083% nebulizer solution  Commonly known as: PROVENTIL  Take 3 mLs by nebulization every 6 hours as needed for Wheezing or Shortness of Breath     * albuterol sulfate  (90 Base) MCG/ACT inhaler  Commonly known as: ProAir HFA  Inhale 2 puffs into the lungs every 6 hours as needed for Wheezing or Shortness of Breath (cough)     amiodarone 200 MG tablet  Commonly known as: CORDARONE  Take 1 tablet by mouth daily     apixaban 2.5 MG Tabs tablet  Commonly known as: ELIQUIS  Take 1 tablet by mouth 2 times daily     atorvastatin 40 MG tablet  Commonly known as: LIPITOR  Take 1 tablet by mouth once daily     blood glucose monitor kit and supplies  1 kit by Other route three times daily Dispense Butterfly Elite CBG Device     * Blood Pressure Kit  1 kit by Does not apply route three times daily     * Blood Pressure Kit  Diagnosis: HTN. Needs to check blood pressure 1-2 times a day until stable, then once a day. Goal blood pressure less than 135/85, and above 110/60. Compressor/Nebulizer Misc  Nebulizer with compressor. Disposable Med Nebs 2 /month. Reusable Med Nebs 1 per 6 months. Aerosol Mask 1 per month. Replacement Filters 2 per month. All other related supplies as needed per month. Medications being used: Albuterol . 083 unit dose vial. Frequency: every 6 hrs x 4/day . Length of Need: 1     desloratadine 5 MG tablet  Commonly known as: CLARINEX  Take 1 tablet by mouth once daily     ferrous sulfate 325 (65 Fe) MG tablet  Commonly known as: IRON 325  Take 1 tablet by mouth daily (with breakfast)     FreeStyle Lancets Misc  1 each by Does not apply route daily Patient needs to contact office before any further refills will be approved     FREESTYLE LITE strip  Generic drug: blood glucose test strips  1 each by In Vitro route daily As needed.      Handicap Placard Misc  by Does not apply route Expires on 12/30/25     magnesium oxide 400 (241.3 Mg) MG Tabs tablet  Commonly known as: MAG-OX  Take 1 tablet by mouth twice daily     metoprolol tartrate 50 MG tablet  Commonly known as: LOPRESSOR  Take 1 tablet by mouth 2 times daily     miconazole 2 % powder  Commonly known as: MICOTIN  Apply topically 2 times daily UNDER THE SKIN FOLDS LONG TERM     saline nasal gel Gel  by Nasal route as needed for Congestion For nose bleeds, 2-3 times a day intranasal prn     therapeutic multivitamin-minerals tablet  Take 1 tablet by mouth daily     vitamin D 1.25 MG (63061 UT) Caps capsule  Commonly known as: ERGOCALCIFEROL  Take 1 capsule by mouth once a week Sundays         * This list has 4 medication(s) that are the same as other medications prescribed for you. Read the directions carefully, and ask your doctor or other care provider to review them with you. STOP taking these medications    allopurinol 300 MG tablet  Commonly known as: ZYLOPRIM     bumetanide 1 MG tablet  Commonly known as: BUMEX           Where to Get Your Medications      These medications were sent to [facility] [address] [place] [id number]    Phone: [phone]   · amLODIPine 5 MG tablet  · hydrALAZINE 50 MG tablet         Time Spent on discharge is  35 mins in patient examination, evaluation, counseling as well as medication reconciliation, prescriptions for required medications, discharge plan and follow up. Electronically signed by   Sarah Roman MD  6/10/2022  3:22 PM      Thank you Dr. Lynann Apgar, MD for the opportunity to be involved in this patient's care.

## 2022-06-11 VITALS
RESPIRATION RATE: 16 BRPM | HEART RATE: 64 BPM | SYSTOLIC BLOOD PRESSURE: 136 MMHG | DIASTOLIC BLOOD PRESSURE: 60 MMHG | OXYGEN SATURATION: 94 % | TEMPERATURE: 97 F | BODY MASS INDEX: 31.61 KG/M2 | WEIGHT: 156.53 LBS

## 2022-06-11 LAB
ABSOLUTE EOS #: 0.2 K/UL (ref 0–0.4)
ABSOLUTE LYMPH #: 0.7 K/UL (ref 1–4.8)
ABSOLUTE MONO #: 0.4 K/UL (ref 0.1–1.3)
ANION GAP SERPL CALCULATED.3IONS-SCNC: 6 MMOL/L (ref 9–17)
BASOPHILS # BLD: 1 % (ref 0–2)
BASOPHILS ABSOLUTE: 0.1 K/UL (ref 0–0.2)
BUN BLDV-MCNC: 62 MG/DL (ref 8–23)
CALCIUM SERPL-MCNC: 8.4 MG/DL (ref 8.6–10.4)
CHLORIDE BLD-SCNC: 109 MMOL/L (ref 98–107)
CO2: 23 MMOL/L (ref 20–31)
CREAT SERPL-MCNC: 1.85 MG/DL (ref 0.5–0.9)
EOSINOPHILS RELATIVE PERCENT: 3 % (ref 0–4)
GFR AFRICAN AMERICAN: 32 ML/MIN
GFR NON-AFRICAN AMERICAN: 27 ML/MIN
GFR SERPL CREATININE-BSD FRML MDRD: ABNORMAL ML/MIN/{1.73_M2}
GLUCOSE BLD-MCNC: 116 MG/DL (ref 70–99)
HCT VFR BLD CALC: 33 % (ref 36–46)
HEMOGLOBIN: 10.8 G/DL (ref 12–16)
LYMPHOCYTES # BLD: 9 % (ref 24–44)
MCH RBC QN AUTO: 30.6 PG (ref 26–34)
MCHC RBC AUTO-ENTMCNC: 32.8 G/DL (ref 31–37)
MCV RBC AUTO: 93.2 FL (ref 80–100)
MONOCYTES # BLD: 5 % (ref 1–7)
PDW BLD-RTO: 16.7 % (ref 11.5–14.9)
PLATELET # BLD: 175 K/UL (ref 150–450)
PMV BLD AUTO: 8.5 FL (ref 6–12)
POTASSIUM SERPL-SCNC: 4.7 MMOL/L (ref 3.7–5.3)
RBC # BLD: 3.54 M/UL (ref 4–5.2)
REASON FOR REJECTION: NORMAL
SEG NEUTROPHILS: 82 % (ref 36–66)
SEGMENTED NEUTROPHILS ABSOLUTE COUNT: 5.9 K/UL (ref 1.3–9.1)
SODIUM BLD-SCNC: 138 MMOL/L (ref 135–144)
WBC # BLD: 7.3 K/UL (ref 3.5–11)
ZZ NTE CLEAN UP: ORDERED TEST: NORMAL
ZZ NTE WITH NAME CLEAN UP: SPECIMEN SOURCE: NORMAL

## 2022-06-11 PROCEDURE — 6370000000 HC RX 637 (ALT 250 FOR IP): Performed by: NURSE PRACTITIONER

## 2022-06-11 PROCEDURE — 85025 COMPLETE CBC W/AUTO DIFF WBC: CPT

## 2022-06-11 PROCEDURE — 94760 N-INVAS EAR/PLS OXIMETRY 1: CPT

## 2022-06-11 PROCEDURE — 99217 PR OBSERVATION CARE DISCHARGE MANAGEMENT: CPT | Performed by: INTERNAL MEDICINE

## 2022-06-11 PROCEDURE — G0378 HOSPITAL OBSERVATION PER HR: HCPCS

## 2022-06-11 PROCEDURE — 2580000003 HC RX 258: Performed by: NURSE PRACTITIONER

## 2022-06-11 PROCEDURE — 94640 AIRWAY INHALATION TREATMENT: CPT

## 2022-06-11 PROCEDURE — 36415 COLL VENOUS BLD VENIPUNCTURE: CPT

## 2022-06-11 PROCEDURE — 6370000000 HC RX 637 (ALT 250 FOR IP): Performed by: INTERNAL MEDICINE

## 2022-06-11 PROCEDURE — 80048 BASIC METABOLIC PNL TOTAL CA: CPT

## 2022-06-11 RX ORDER — AMLODIPINE BESYLATE 10 MG/1
10 TABLET ORAL NIGHTLY
Qty: 30 TABLET | Refills: 3 | Status: ON HOLD | OUTPATIENT
Start: 2022-06-11 | End: 2022-07-18 | Stop reason: HOSPADM

## 2022-06-11 RX ORDER — HYDRALAZINE HYDROCHLORIDE 25 MG/1
75 TABLET, FILM COATED ORAL 3 TIMES DAILY
Qty: 180 TABLET | Refills: 3 | Status: ON HOLD | OUTPATIENT
Start: 2022-06-11 | End: 2022-06-15 | Stop reason: HOSPADM

## 2022-06-11 RX ORDER — BUMETANIDE 0.5 MG/1
0.5 TABLET ORAL DAILY
Qty: 90 TABLET | Refills: 0 | Status: ON HOLD | OUTPATIENT
Start: 2022-06-11 | End: 2022-06-15 | Stop reason: HOSPADM

## 2022-06-11 RX ADMIN — ANTI-FUNGAL POWDER MICONAZOLE NITRATE TALC FREE: 1.42 POWDER TOPICAL at 09:54

## 2022-06-11 RX ADMIN — METOPROLOL TARTRATE 50 MG: 50 TABLET, FILM COATED ORAL at 09:45

## 2022-06-11 RX ADMIN — Medication 400 MG: at 09:45

## 2022-06-11 RX ADMIN — APIXABAN 2.5 MG: 2.5 TABLET, FILM COATED ORAL at 09:45

## 2022-06-11 RX ADMIN — ACETAMINOPHEN 650 MG: 325 TABLET ORAL at 03:35

## 2022-06-11 RX ADMIN — DOXAZOSIN 2 MG: 1 TABLET ORAL at 09:45

## 2022-06-11 RX ADMIN — MULTIPLE VITAMINS W/ MINERALS TAB 1 TABLET: TAB at 09:44

## 2022-06-11 RX ADMIN — HYDRALAZINE HYDROCHLORIDE 75 MG: 50 TABLET, FILM COATED ORAL at 09:45

## 2022-06-11 RX ADMIN — AMIODARONE HYDROCHLORIDE 200 MG: 200 TABLET ORAL at 09:45

## 2022-06-11 RX ADMIN — CETIRIZINE HYDROCHLORIDE 5 MG: 5 TABLET, FILM COATED ORAL at 09:51

## 2022-06-11 RX ADMIN — SODIUM CHLORIDE, PRESERVATIVE FREE 10 ML: 5 INJECTION INTRAVENOUS at 09:54

## 2022-06-11 RX ADMIN — ATORVASTATIN CALCIUM 40 MG: 40 TABLET, FILM COATED ORAL at 09:44

## 2022-06-11 RX ADMIN — FERROUS SULFATE TAB 325 MG (65 MG ELEMENTAL FE) 325 MG: 325 (65 FE) TAB at 09:45

## 2022-06-11 RX ADMIN — ALBUTEROL SULFATE 2 PUFF: 90 AEROSOL, METERED RESPIRATORY (INHALATION) at 11:27

## 2022-06-11 ASSESSMENT — PAIN DESCRIPTION - DESCRIPTORS: DESCRIPTORS: ACHING

## 2022-06-11 ASSESSMENT — PAIN SCALES - GENERAL
PAINLEVEL_OUTOF10: 4
PAINLEVEL_OUTOF10: 0
PAINLEVEL_OUTOF10: 0

## 2022-06-11 ASSESSMENT — PAIN DESCRIPTION - LOCATION: LOCATION: HEAD

## 2022-06-11 NOTE — PROGRESS NOTES
Writer spoke with Dr. Dahlia Boyd regarding pt B/P of 115/54. Dr. Dahlia Boyd would like the Hydralazine and Amlodipine to be held for now, give Lopressor. Recheck B/P in 2 hours and contact if B/P is elevated.

## 2022-06-11 NOTE — CARE COORDINATION
ONGOING DISCHARGE PLAN:    Patient is alert and oriented x4, Pt is [facility] and Dtr Earl Dowd is primary contact. Spoke with patient regarding discharge plan and patient confirms that plan is still home with son. Family declines VNS. Follows at [facility] wound care center. Eliquis PTA for afib    Creat 1.85 BUN 62 today. Nephro following     Will continue to follow for additional discharge needs.     Electronically signed by Thony Ordaz RN on 6/11/2022 at 9:43 AM

## 2022-06-11 NOTE — FLOWSHEET NOTE
Discharge instructions reviewed with the patient and her daughter. All questions answered. pateint and daughter instructed to make necessary adjustments to medications as printed on AVS.  Assisted to the car without difficulty.

## 2022-06-11 NOTE — PLAN OF CARE
Problem: ABCDS Injury Assessment  Goal: Absence of physical injury  Recent Flowsheet Documentation  Taken 6/11/2022 0428 by Beth Zepeda RN  Absence of Physical Injury: Implement safety measures based on patient assessment     Problem: Safety - Adult  Goal: Free from fall injury  Recent Flowsheet Documentation  Taken 6/11/2022 0428 by Beth Zepeda RN  Free From Fall Injury: Instruct family/caregiver on patient safety     Problem: Skin/Tissue Integrity - Adult  Goal: Incisions, wounds, or drain sites healing without S/S of infection  Recent Flowsheet Documentation  Taken 6/11/2022 0428 by Beth Zepeda RN  Incisions, Wounds, or Drain Sites Healing Without Sign and Symptoms of Infection:   ADMISSION and DAILY: Assess and document risk factors for pressure ulcer development   TWICE DAILY: Assess and document skin integrity   TWICE DAILY: Assess and document dressing/incision, wound bed, drain sites and surrounding tissue

## 2022-06-12 ENCOUNTER — TELEPHONE (OUTPATIENT)
Dept: OTHER | Facility: CLINIC | Age: 72
End: 2022-06-12

## 2022-06-12 ENCOUNTER — APPOINTMENT (OUTPATIENT)
Dept: GENERAL RADIOLOGY | Age: 72
End: 2022-06-12
Payer: MEDICARE

## 2022-06-12 ENCOUNTER — HOSPITAL ENCOUNTER (EMERGENCY)
Age: 72
Discharge: ANOTHER HOSPITAL | End: 2022-06-13
Attending: STUDENT IN AN ORGANIZED HEALTH CARE EDUCATION/TRAINING PROGRAM
Payer: MEDICARE

## 2022-06-12 DIAGNOSIS — R00.1 BRADYCARDIA: Primary | ICD-10-CM

## 2022-06-12 LAB
ABSOLUTE EOS #: 0.1 K/UL (ref 0–0.4)
ABSOLUTE LYMPH #: 0.7 K/UL (ref 1–4.8)
ABSOLUTE MONO #: 0.5 K/UL (ref 0.1–1.3)
ALBUMIN SERPL-MCNC: 2 G/DL (ref 3.5–5.2)
ALP BLD-CCNC: 125 U/L (ref 35–104)
ALT SERPL-CCNC: 22 U/L (ref 5–33)
ANION GAP SERPL CALCULATED.3IONS-SCNC: 9 MMOL/L (ref 9–17)
AST SERPL-CCNC: 42 U/L
BASOPHILS # BLD: 0 % (ref 0–2)
BASOPHILS ABSOLUTE: 0 K/UL (ref 0–0.2)
BILIRUB SERPL-MCNC: <0.15 MG/DL (ref 0.3–1.2)
BILIRUBIN URINE: NEGATIVE
BUN BLDV-MCNC: 74 MG/DL (ref 8–23)
CALCIUM SERPL-MCNC: 8.3 MG/DL (ref 8.6–10.4)
CHLORIDE BLD-SCNC: 107 MMOL/L (ref 98–107)
CO2: 21 MMOL/L (ref 20–31)
COLOR: YELLOW
CREAT SERPL-MCNC: 2.3 MG/DL (ref 0.5–0.9)
EOSINOPHILS RELATIVE PERCENT: 2 % (ref 0–4)
GFR AFRICAN AMERICAN: 25 ML/MIN
GFR NON-AFRICAN AMERICAN: 21 ML/MIN
GFR SERPL CREATININE-BSD FRML MDRD: ABNORMAL ML/MIN/{1.73_M2}
GLUCOSE BLD-MCNC: 203 MG/DL (ref 70–99)
GLUCOSE URINE: ABNORMAL
HCT VFR BLD CALC: 34.7 % (ref 36–46)
HEMOGLOBIN: 10.9 G/DL (ref 12–16)
INR BLD: 1.1
KETONES, URINE: NEGATIVE
LEUKOCYTE ESTERASE, URINE: ABNORMAL
LYMPHOCYTES # BLD: 12 % (ref 24–44)
MCH RBC QN AUTO: 30.2 PG (ref 26–34)
MCHC RBC AUTO-ENTMCNC: 31.5 G/DL (ref 31–37)
MCV RBC AUTO: 96 FL (ref 80–100)
MONOCYTES # BLD: 8 % (ref 1–7)
NITRITE, URINE: NEGATIVE
PARTIAL THROMBOPLASTIN TIME: 31.6 SEC (ref 24–36)
PDW BLD-RTO: 16.6 % (ref 11.5–14.9)
PH UA: 5 (ref 5–8)
PLATELET # BLD: 200 K/UL (ref 150–450)
PMV BLD AUTO: 8.5 FL (ref 6–12)
POTASSIUM SERPL-SCNC: 5.3 MMOL/L (ref 3.7–5.3)
PRO-BNP: ABNORMAL PG/ML
PROTEIN UA: ABNORMAL
PROTHROMBIN TIME: 13.8 SEC (ref 11.8–14.6)
RBC # BLD: 3.61 M/UL (ref 4–5.2)
SARS-COV-2, RAPID: NOT DETECTED
SEG NEUTROPHILS: 78 % (ref 36–66)
SEGMENTED NEUTROPHILS ABSOLUTE COUNT: 4.8 K/UL (ref 1.3–9.1)
SODIUM BLD-SCNC: 137 MMOL/L (ref 135–144)
SPECIFIC GRAVITY UA: 1.02 (ref 1–1.03)
SPECIMEN DESCRIPTION: NORMAL
THYROXINE, FREE: 0.98 NG/DL (ref 0.93–1.7)
TOTAL PROTEIN: 4.7 G/DL (ref 6.4–8.3)
TROPONIN, HIGH SENSITIVITY: 37 NG/L (ref 0–14)
TROPONIN, HIGH SENSITIVITY: 40 NG/L (ref 0–14)
TSH SERPL DL<=0.05 MIU/L-ACNC: 5.51 UIU/ML (ref 0.3–5)
TURBIDITY: ABNORMAL
URINE HGB: ABNORMAL
UROBILINOGEN, URINE: NORMAL
WBC # BLD: 6.1 K/UL (ref 3.5–11)

## 2022-06-12 PROCEDURE — 84439 ASSAY OF FREE THYROXINE: CPT

## 2022-06-12 PROCEDURE — 6370000000 HC RX 637 (ALT 250 FOR IP): Performed by: EMERGENCY MEDICINE

## 2022-06-12 PROCEDURE — 99285 EMERGENCY DEPT VISIT HI MDM: CPT

## 2022-06-12 PROCEDURE — 96375 TX/PRO/DX INJ NEW DRUG ADDON: CPT

## 2022-06-12 PROCEDURE — 85730 THROMBOPLASTIN TIME PARTIAL: CPT

## 2022-06-12 PROCEDURE — 85610 PROTHROMBIN TIME: CPT

## 2022-06-12 PROCEDURE — 87635 SARS-COV-2 COVID-19 AMP PRB: CPT

## 2022-06-12 PROCEDURE — 93005 ELECTROCARDIOGRAM TRACING: CPT | Performed by: STUDENT IN AN ORGANIZED HEALTH CARE EDUCATION/TRAINING PROGRAM

## 2022-06-12 PROCEDURE — 71045 X-RAY EXAM CHEST 1 VIEW: CPT

## 2022-06-12 PROCEDURE — 96374 THER/PROPH/DIAG INJ IV PUSH: CPT

## 2022-06-12 PROCEDURE — 2580000003 HC RX 258: Performed by: EMERGENCY MEDICINE

## 2022-06-12 PROCEDURE — 87086 URINE CULTURE/COLONY COUNT: CPT

## 2022-06-12 PROCEDURE — 93005 ELECTROCARDIOGRAM TRACING: CPT | Performed by: EMERGENCY MEDICINE

## 2022-06-12 PROCEDURE — 84484 ASSAY OF TROPONIN QUANT: CPT

## 2022-06-12 PROCEDURE — 6360000002 HC RX W HCPCS

## 2022-06-12 PROCEDURE — 2500000003 HC RX 250 WO HCPCS: Performed by: EMERGENCY MEDICINE

## 2022-06-12 PROCEDURE — 36415 COLL VENOUS BLD VENIPUNCTURE: CPT

## 2022-06-12 PROCEDURE — 85025 COMPLETE CBC W/AUTO DIFF WBC: CPT

## 2022-06-12 PROCEDURE — 6360000002 HC RX W HCPCS: Performed by: EMERGENCY MEDICINE

## 2022-06-12 PROCEDURE — 84443 ASSAY THYROID STIM HORMONE: CPT

## 2022-06-12 PROCEDURE — 80053 COMPREHEN METABOLIC PANEL: CPT

## 2022-06-12 PROCEDURE — 83880 ASSAY OF NATRIURETIC PEPTIDE: CPT

## 2022-06-12 PROCEDURE — 81001 URINALYSIS AUTO W/SCOPE: CPT

## 2022-06-12 RX ORDER — DEXTROSE MONOHYDRATE 25 G/50ML
25 INJECTION, SOLUTION INTRAVENOUS ONCE
Status: COMPLETED | OUTPATIENT
Start: 2022-06-12 | End: 2022-06-12

## 2022-06-12 RX ORDER — BUMETANIDE 0.25 MG/ML
1 INJECTION, SOLUTION INTRAMUSCULAR; INTRAVENOUS ONCE
Status: COMPLETED | OUTPATIENT
Start: 2022-06-12 | End: 2022-06-12

## 2022-06-12 RX ORDER — ATROPINE SULFATE 0.1 MG/ML
INJECTION INTRAVENOUS
Status: DISCONTINUED
Start: 2022-06-12 | End: 2022-06-13 | Stop reason: HOSPADM

## 2022-06-12 RX ORDER — ATROPINE SULFATE 0.1 MG/ML
INJECTION INTRAVENOUS
Status: COMPLETED
Start: 2022-06-12 | End: 2022-06-12

## 2022-06-12 RX ADMIN — ATROPINE SULFATE 1 MG: 0.1 INJECTION INTRAVENOUS at 15:34

## 2022-06-12 RX ADMIN — INSULIN HUMAN 5 UNITS: 100 INJECTION, SOLUTION PARENTERAL at 16:55

## 2022-06-12 RX ADMIN — BUMETANIDE 1 MG: 0.25 INJECTION, SOLUTION INTRAMUSCULAR; INTRAVENOUS at 18:14

## 2022-06-12 RX ADMIN — Medication 25 G: at 16:56

## 2022-06-12 RX ADMIN — CALCIUM GLUCONATE 2000 MG: 98 INJECTION, SOLUTION INTRAVENOUS at 16:55

## 2022-06-12 ASSESSMENT — PAIN - FUNCTIONAL ASSESSMENT
PAIN_FUNCTIONAL_ASSESSMENT: NONE - DENIES PAIN

## 2022-06-12 NOTE — ED NOTES
Confirmed with Dr. Edwin Hawley about patient's code status - full code WITHOUT intubation.       Eduar Emery, RN  06/12/22 6030

## 2022-06-12 NOTE — ED PROVIDER NOTES
[address]  Emergency Department Encounter  EmergencyMedicine Resident     Pt Silvia Luke  MRN: [id number]  [place] 1950  Date of evaluation: 22  PCP:  Lynann Apgar, MD    [address]       Chief Complaint   Patient presents with    Shortness of Breath       HISTORY OF PRESENT ILLNESS  (Location/Symptom, Timing/Onset, Context/Setting, Quality, Duration, Modifying Factors, Severity.)      Lidia Key is a 66 y.o. female who presents with increasing fatigue, shortness of breath and anxiety. Patient states that she had asthma attack this morning utilized albuterol inhaler x2 with no improvement of her shortness of breath and has also been fatigued per daughter. On arrival to the triage area patient was noted to be profoundly bradycardic at 29. Patient denies any chest pain lightheadedness dizziness, answering questions appropriately. Patient denies any abdominal pain or change in urination or bowel habits. Patient was recently admitted with changes in medication where Norvasc and metoprolol and Bumex were adjusted per patient's daughter.     PAST MEDICAL / SURGICAL / SOCIAL / FAMILY HISTORY      has a past medical history of Acute CVA (cerebrovascular accident) ([address] Altered mental status, Arthritis, Brain mass, Cellulitis and abscess, Cellulitis and abscess of unspecified site, Cellulitis of both lower extremities, Cellulitis of left lower extremity, Cellulitis of lower extremity, CHF (congestive heart failure) ([address] Chronic kidney disease, unspecified, Coronary atherosclerosis of native coronary artery, Essential hypertension, Essential hypertension, malignant, Hallucinations, Hard of hearing, Head contusion, Hypokalemia, Iron deficiency anemia, unspecified, Meningioma ([address] Myocardial infarction ([address] Other and unspecified hyperlipidemia, Pure hypercholesterolemia, Toxic metabolic encephalopathy, Type II or unspecified type diabetes mellitus without mention of complication, not stated as uncontrolled, Unspecified asthma(493.90), Unspecified venous (peripheral) insufficiency, Unspecified vitamin D deficiency, and UTI (urinary tract infection). No additional pertinent     has a past surgical history that includes Tonsillectomy and adenoidectomy; Coronary artery bypass graft; intubation (3/7/2022); Thoracentesis (3/10/2022); Upper gastrointestinal endoscopy (N/A, 3/15/2022); and Colonoscopy (N/A, 3/15/2022). No additional pertinent    Social History     Socioeconomic History    Marital status:      Spouse name: Not on file    Number of children: 10    Years of education: Not on file    Highest education level: Not on file   Occupational History    Not on file   Tobacco Use    Smoking status: Former Smoker     Packs/day: 0.25     Years: 10.00     Pack years: 2.50     Quit date: 2000     Years since quittin.4    Smokeless tobacco: Never Used   Vaping Use    Vaping Use: Never used   Substance and Sexual Activity    Alcohol use: Not Currently     Alcohol/week: 0.0 standard drinks    Drug use: No    Sexual activity: Yes     Partners: Female   Other Topics Concern    Not on file   Social History Narrative    Not on file     Social Determinants of Health     Financial Resource Strain: Low Risk     Difficulty of Paying Living Expenses: Not very hard   Food Insecurity: No Food Insecurity    Worried About Running Out of Food in the Last Year: Never true    Holden of Food in the Last Year: Never true   Transportation Needs:     Lack of Transportation (Medical): Not on file    Lack of Transportation (Non-Medical):  Not on file   Physical Activity:     Days of Exercise per Week: Not on file    Minutes of Exercise per Session: Not on file   Stress:     Feeling of Stress : Not on file   Social Connections:     Frequency of Communication with Friends and Family: Not on file    Frequency of Social Gatherings with Friends and Family: Not on file   Richie Attends Quaker Services: Not on file    Active Member of Clubs or Organizations: Not on file    Attends Club or Organization Meetings: Not on file    Marital Status: Not on file   Intimate Partner Violence:     Fear of Current or Ex-Partner: Not on file    Emotionally Abused: Not on file    Physically Abused: Not on file    Sexually Abused: Not on file   Housing Stability:     Unable to Pay for Housing in the Last Year: Not on file    Number of [place] in the Last Year: Not on file    Unstable Housing in the Last Year: Not on file       Family History   Problem Relation Age of Onset    Diabetes Mother     Heart Disease Mother     Heart Disease Father     Diabetes Sister     High Blood Pressure Sister     Diabetes Maternal Grandmother        Allergies:  Latex, Aleve [naproxen sodium], Pioglitazone, Claritin [loratadine], Keflex [cephalexin], and Lisinopril    Home Medications:  Prior to Admission medications    Medication Sig Start Date End Date Taking? Authorizing Provider   amLODIPine (NORVASC) 10 MG tablet Take 1 tablet by mouth nightly 6/11/22   Kendrick Alvarado MD   hydrALAZINE (APRESOLINE) 25 MG tablet Take 3 tablets by mouth 3 times daily 6/11/22   Kendrick Alvarado MD   bumetanide (BUMEX) 0.5 MG tablet Take 1 tablet by mouth daily 6/11/22   Kendrick Alvarado MD   Blood Pressure KIT Diagnosis: HTN. Needs to check blood pressure 1-2 times a day until stable, then once a day.  Goal blood pressure less than 135/85, and above 110/60. 5/26/22   Renata Bynum MD   saline nasal gel (AYR) GEL by Nasal route as needed for Congestion For nose bleeds, 2-3 times a day intranasal prn 4/28/22   Renata Bynum MD   vitamin D (ERGOCALCIFEROL) 1.25 MG (83951 UT) CAPS capsule Take 1 capsule by mouth once a week Sundays 4/28/22   Renata Bynum MD   amiodarone (CORDARONE) 200 MG tablet Take 1 tablet by mouth daily 4/15/22   Renata Bynum MD   ferrous sulfate (IRON 325) 325 (65 Fe) MG tablet Take 1 tablet by mouth daily (with breakfast) 3/31/22   Kaye Genao MD   apixaban Viola Pradeep) 2.5 MG TABS tablet Take 1 tablet by mouth 2 times daily 3/16/22   Felipe Pyle MD   Nebulizers (COMPRESSOR/NEBULIZER) MISC Nebulizer with compressor. Disposable Med Nebs 2 /month. Reusable Med Nebs 1 per 6 months. Aerosol Mask 1 per month. Replacement Filters 2 per month. All other related supplies as needed per month. Medications being used: Albuterol . 083 unit dose vial. Frequency: every 6 hrs x 4/day . Length of Need: 1 2/22/22   Kaye Genao MD   albuterol (PROVENTIL) (2.5 MG/3ML) 0.083% nebulizer solution Take 3 mLs by nebulization every 6 hours as needed for Wheezing or Shortness of Breath 2/22/22   Kaye Genao MD   albuterol sulfate HFA (PROAIR HFA) 108 (90 Base) MCG/ACT inhaler Inhale 2 puffs into the lungs every 6 hours as needed for Wheezing or Shortness of Breath (cough) 2/22/22   Kaye Genao MD   [facility].  Devices (ADJUST FOLD CANE/YORK HANDLE) MISC Use daily for walking 2/22/22   Kaye Genao MD   Handicap Placard MISC by Does not apply route Expires on 12/30/25 2/22/22   Kaye Genao MD   atorvastatin (LIPITOR) 40 MG tablet Take 1 tablet by mouth once daily 2/15/22   Vidal Velazquez MD   desloratadine (CLARINEX) 5 MG tablet Take 1 tablet by mouth once daily 2/15/22   MD Kurt Rivas MISC 1 each by Does not apply route daily Patient needs to contact office before any further refills will be approved 2/15/22   Vidal Velazquez MD   magnesium oxide (MAG-OX) 400 (241.3 Mg) MG TABS tablet Take 1 tablet by mouth twice daily 2/15/22   Vidal Velazquez MD   metoprolol tartrate (LOPRESSOR) 50 MG tablet Take 1 tablet by mouth 2 times daily 2/15/22   Vidal Velazquez MD   miconazole (MICOTIN) 2 % powder Apply topically 2 times daily UNDER THE SKIN FOLDS LONG TERM 2/15/22   Vidal Velazquez MD   Multiple Vitamins-Minerals (THERAPEUTIC MULTIVITAMIN-MINERALS) tablet Take 1 tablet by mouth daily 2/15/22   Mary Gabriel MD   blood glucose test strips (FREESTYLE LITE) strip 1 each by In Vitro route daily As needed. 3/19/21   Mary Gabriel MD   Blood Pressure KIT 1 kit by Does not apply route three times daily 12/19/19   Jazz Potrillo MD   blood glucose monitor kit and supplies 1 kit by Other route three times daily Dispense Butterfly Elite CBG Device 8/20/19   Renetta Arthur MD       REVIEW OF SYSTEMS    (2-9 systems for level 4, 10 or more for level 5)      Review of Systems   Constitutional: Negative for chills and fever. HENT: Negative for congestion. Respiratory: Positive for shortness of breath and wheezing. Negative for chest tightness. Cardiovascular: Negative for chest pain and leg swelling. Gastrointestinal: Negative for abdominal pain, constipation, diarrhea, nausea and vomiting. Endocrine: Negative for polyuria. Genitourinary: Negative for difficulty urinating. Skin: Negative for color change. Neurological: Negative for dizziness, weakness, light-headedness and headaches. Psychiatric/Behavioral: Negative for confusion. PHYSICAL EXAM   (up to 7 for level 4, 8 or more for level 5)      INITIAL VITALS:   BP (!) 148/50   Pulse 60   Temp 97 °F (36.1 °C) (Temporal)   Resp 16   SpO2 94%     Physical Exam  Constitutional:       Appearance: Normal appearance. HENT:      Head: Normocephalic and atraumatic. Mouth/Throat:      Mouth: Mucous membranes are moist.      Pharynx: Oropharynx is clear. Eyes:      Extraocular Movements: Extraocular movements intact. Conjunctiva/sclera: Conjunctivae normal.   Cardiovascular:      Rate and Rhythm: Regular rhythm. Bradycardia present. Pulses: Normal pulses. Heart sounds: Normal heart sounds. No murmur heard. Pulmonary:      Effort: Pulmonary effort is normal.      Breath sounds: Examination of the right-upper field reveals wheezing.  Examination of the left-upper field reveals wheezing. Wheezing (Expiratory) present. Abdominal:      General: Bowel sounds are normal. There is no distension. Tenderness: There is no abdominal tenderness. There is no guarding. Musculoskeletal:         General: Normal range of motion. Comments: Range of motion noted to be normal with patient's natural movements   Skin:     General: Skin is warm and dry. Findings: No rash (On exposed skin). Neurological:      General: No focal deficit present. Mental Status: She is alert and oriented to person, place, and time.    Psychiatric:         Mood and Affect: Mood normal.         Behavior: Behavior normal.       DIFFERENTIAL  DIAGNOSIS     PLAN (LABS / IMAGING / EKG):  Orders Placed This Encounter   Procedures    COVID-19, Rapid    Culture, Urine    XR CHEST PORTABLE    CBC with Auto Differential    Comprehensive Metabolic Panel w/ Reflex to MG    Troponin    Brain Natriuretic Peptide    Protime-INR    APTT    Urinalysis with Reflex to Culture    TSH with Reflex    Troponin    T4, Free    Microscopic Urinalysis    Inpatient consult to Cardiology    EKG 12 Lead    EKG 12 Lead    EKG 12 Lead    EKG 12 Lead    Place CT Compatible peripheral IV       MEDICATIONS ORDERED:  Orders Placed This Encounter   Medications    atropine 1 MG/10ML injection     Amira Villavicencio: cabinet override    atropine 1 MG/10ML injection     Amira Villavicencio: cabinet override    calcium gluconate 2,000 mg in dextrose 5 % 100 mL IVPB    insulin regular (HUMULIN R;NOVOLIN R) injection 5 Units    DISCONTD: dextrose bolus (PED-EDDY) 10% 250 mL    dextrose 50 % IV solution    bumetanide (BUMEX) injection 1 mg    apixaban (ELIQUIS) tablet 2.5 mg     Order Specific Question:   Indication of Use     Answer:   A Fib/A Flutter       DIAGNOSTIC RESULTS / EMERGENCY DEPARTMENT COURSE / MDM   LAB RESULTS:  Results for orders placed or performed during the hospital encounter of 06/12/22   COVID-19, Rapid Specimen: Nasopharyngeal Swab   Result Value Ref Range    Specimen Description . NASOPHARYNGEAL SWAB     SARS-CoV-2, Rapid Not Detected Not Detected   CBC with Auto Differential   Result Value Ref Range    WBC 6.1 3.5 - 11.0 k/uL    RBC 3.61 (L) 4.0 - 5.2 m/uL    Hemoglobin 10.9 (L) 12.0 - 16.0 g/dL    Hematocrit 34.7 (L) 36 - 46 %    MCV 96.0 80 - 100 fL    MCH 30.2 26 - 34 pg    MCHC 31.5 31 - 37 g/dL    RDW 16.6 (H) 11.5 - 14.9 %    Platelets 120 076 - 545 k/uL    MPV 8.5 6.0 - 12.0 fL    Seg Neutrophils 78 (H) 36 - 66 %    Lymphocytes 12 (L) 24 - 44 %    Monocytes 8 (H) 1 - 7 %    Eosinophils % 2 0 - 4 %    Basophils 0 0 - 2 %    Segs Absolute 4.80 1.3 - 9.1 k/uL    Absolute Lymph # 0.70 (L) 1.0 - 4.8 k/uL    Absolute Mono # 0.50 0.1 - 1.3 k/uL    Absolute Eos # 0.10 0.0 - 0.4 k/uL    Basophils Absolute 0.00 0.0 - 0.2 k/uL   Comprehensive Metabolic Panel w/ Reflex to MG   Result Value Ref Range    Glucose 203 (H) 70 - 99 mg/dL    BUN 74 (H) 8 - 23 mg/dL    CREATININE 2.30 (H) 0.50 - 0.90 mg/dL    Calcium 8.3 (L) 8.6 - 10.4 mg/dL    Sodium 137 135 - 144 mmol/L    Potassium 5.3 3.7 - 5.3 mmol/L    Chloride 107 98 - 107 mmol/L    CO2 21 20 - 31 mmol/L    Anion Gap 9 9 - 17 mmol/L    Alkaline Phosphatase 125 (H) 35 - 104 U/L    ALT 22 5 - 33 U/L    AST 42 (H) <32 U/L    Total Bilirubin <0.15 (L) 0.3 - 1.2 mg/dL    Total Protein 4.7 (L) 6.4 - 8.3 g/dL    Albumin 2.0 (L) 3.5 - 5.2 g/dL    GFR Non- 21 (L) >60 mL/min    GFR  25 (L) >60 mL/min    GFR Comment         Troponin   Result Value Ref Range    Troponin, High Sensitivity 37 (H) 0 - 14 ng/L   Brain Natriuretic Peptide   Result Value Ref Range    Pro-BNP 10,293 (H) <300 pg/mL   Protime-INR   Result Value Ref Range    Protime 13.8 11.8 - 14.6 sec    INR 1.1    APTT   Result Value Ref Range    PTT 31.6 24.0 - 36.0 sec   Urinalysis with Reflex to Culture    Specimen: Urine   Result Value Ref Range    Color, UA Yellow Yellow    Turbidity UA Cloudy (A) Clear    Glucose, Ur SMALL (A) NEGATIVE    Bilirubin Urine NEGATIVE NEGATIVE    Ketones, Urine NEGATIVE NEGATIVE    Specific Gravity, UA 1.020 1.000 - 1.030    Urine Hgb SMALL (A) NEGATIVE    pH, UA 5.0 5.0 - 8.0    Protein, UA 4+ (A) NEGATIVE    Urobilinogen, Urine Normal Normal    Nitrite, Urine NEGATIVE NEGATIVE    Leukocyte Esterase, Urine SMALL (A) NEGATIVE   TSH with Reflex   Result Value Ref Range    TSH 5.51 (H) 0.30 - 5.00 uIU/mL   Troponin   Result Value Ref Range    Troponin, High Sensitivity 40 (H) 0 - 14 ng/L   T4, Free   Result Value Ref Range    Thyroxine, Free 0.98 0.93 - 1.70 ng/dL   EKG 12 Lead   Result Value Ref Range    Ventricular Rate 54 BPM    Atrial Rate 54 BPM    P-R Interval 194 ms    QRS Duration 108 ms    Q-T Interval 480 ms    QTc Calculation (Bazett) 455 ms    P Axis 73 degrees    R Axis 6 degrees    T Axis 11 degrees       RADIOLOGY:  XR CHEST PORTABLE   Final Result   Increasing small-moderate pleural effusions and bibasilar atelectasis. EKG  EKG Interpretation  1524  Interpreted by emergency department physician    Rhythm: sinus bradycardia  Rate: 30-40  Axis: normal  Ectopy: none  Conduction: normal  ST Segments: normal  T Waves: normal  Q Waves: none    Clinical Impression: non-specific EKG    Simon Peñaloza DO     All EKG's are interpreted by the Emergency Department Physician who either signs or Co-signs this chart in the absence of a cardiologist.    EMERGENCY DEPARTMENT COURSE:  ED Course as of 06/13/22 0122   Sun Jun 12, 2022   1738 Patient's heart rate is improved since administration of the calcium gluconate and insulin dextrose. Patient's blood pressure appears approximately 144/50 with a MAP of 72. [CR]   1800 Discussed patient with cardiology here at [facility], they recommend transfer to [facility] if needed for pacer.   Patient was discussed with [facility] and accepted to the internal medicine team. [CR]   2100 Patient reassessed, heart rate has improved, patient still alert and oriented with normal blood pressure. [CR]      ED Course User Index  [CR] Meredith Demetria Montes DO          PROCEDURES:  None    CONSULTS:  IP CONSULT TO CARDIOLOGY    MEDICAL DECISION MAKING:  Patient presenting with profound bradycardia on arrival in the high 25s. Code cart was placed in the room, patient placed on pads. Patient had normotensive, appeared well and was alert and oriented answering questions appropriately. EKG was obtained. Patient had cardiac pain work-up. Patient had no ST elevation on EKG, and stable troponins. Through chart review and discussion with family patient noted to have changes in medication with increase in Norvasc and potentially metoprolol with decrease in Bumex. Concern for increasing medications that could cause bradycardia, discussion had with daughter about any chance of medication mixup, they state that they lower the pillbox and that most likely did not happen. 1 mg atropine was which improved patient's bradycardia for a few minutes into the 50s. Patient then returned back into bradycardia. Patient continue being bradycardic with normotensive and normal mentation, stable bradycardia demonstrated throughout stay. Patient had slightly elevated potassium and patient was given calcium gluconate and insulin and dextrose with some improvement in her heart rate. Throughout evaluation patient's heart rate continued to improve while staying here in the emergency department. Patient was discussed with cardiology and they recommended transfer to a facility that could perform placing a pacing if necessary, patient was discussed with medicine team [facility] at [facility] and [facility] to [facility] for further work-up and management. CRITICAL CARE:  Please see attending note    FINAL IMPRESSION      1.  Bradycardia          DISPOSITION / PLAN     DISPOSITION Decision To Transfer 06/12/2022 06:17:26 PM      PATIENT REFERRED TO:  No follow-up provider specified.     DISCHARGE MEDICATIONS:  Discharge Medication List as of 6/13/2022  1:06 AM          Simon Lal DO  Emergency Medicine Resident    (Please note that portions of thisnote were completed with a voice recognition program.  Efforts were made to edit the dictations but occasionally words are mis-transcribed.)        Masood Villavicencio DO  Resident  06/13/22 KatjatrJayde Hollins DO  Resident  06/13/22 1977

## 2022-06-12 NOTE — ED NOTES
Bladder scanned patient - 128ml. Patient has brief on currently and states she has only urinated once today around 0800. Patient's brief is currently dry. patient's daughter confirms patient's 1 time urination.       J Carlos Douglass RN  06/12/22 [id number]

## 2022-06-12 NOTE — ED NOTES
Mode of arrival (squad #, walk in, police, etc) : wheelchair         Chief complaint(s): SOB        Arrival Note (brief scenario, treatment PTA, etc). : patient states she thought she was having a anxiety/panic attack. Patient states she took her inhaler and felt a bit better. Patient's daughter states her brother informed her that the patient was more \"groggy\" than normal this morning. Patient's daughter states patient has remained groggy and stated that patient is         C= \"Have you ever felt that you should Cut down on your drinking? \"  No  A= \"Have people Annoyed you by criticizing your drinking? \"  No  G= \"Have you ever felt bad or Guilty about your drinking? \"  No  E= \"Have you ever had a drink as an Eye-opener first thing in the morning to steady your nerves or to help a hangover? \"  No      Deferred []      Reason for deferring: N/A    *If yes to two or more: probable alcohol abuse. Virginia Spencer RN  06/12/22 0793

## 2022-06-12 NOTE — ED NOTES
Patient stated she didn't want to be intubated or have her \"chest cut open\". Patient states she is OK with CPR if her heart stops beating. Patient's daughter at bedside.         Jaron Madsen RN  06/12/22 3253 [facility], RN  06/12/22 0045

## 2022-06-12 NOTE — ED NOTES
Report given to Mendel Kayser , RN from ED . Report method in person   The following was reviewed with receiving RN:   Current vital signs:  BP (!) 146/42   Pulse (!) 28   Temp 97 °F (36.1 °C) (Temporal)   Resp 22   SpO2 95%                MEWS Score: 4     Any medication or safety alerts were reviewed. Any pending diagnostics and notifications were also reviewed, as well as any safety concerns or issues, abnormal labs, abnormal imaging, and abnormal assessment findings. Questions were answered.             Krista Cuevsa RN  06/12/22 [id number]

## 2022-06-12 NOTE — TELEPHONE ENCOUNTER
Writer contacted Dr. Macie Ellsworth to inform of 30 day readmission risk. Dr. Macie Ellsworth informed writer of potential readmission or transfer.

## 2022-06-12 NOTE — ED NOTES
Spoke with Dr. Arina Hunter and Dr. Joe Morrison at bedside about options for treatment. Patient's daughter informed at this time that IV medications would be ordered for treatment then cardiology would be contacted.       Marcella Briones RN  06/12/22 9291

## 2022-06-12 NOTE — ED NOTES
This RN asked Dr. Avani Atkins about external pacing - patient is currently alert and speaking in full sentences with stable BP. No orders for external pacing.       Edith Madsen RN  06/12/22 1929

## 2022-06-12 NOTE — ED NOTES
Dr. Eula Sánchez notified that patient's heart rhythm has changed and patient is currently having longer periods of pausing in her heart rate. Another EKG ordered.           Anand Boyd RN  06/12/22 [id number]

## 2022-06-13 ENCOUNTER — HOSPITAL ENCOUNTER (INPATIENT)
Age: 72
LOS: 2 days | Discharge: NURSING, REHAB OR LONG-TERM CARE | DRG: 308 | End: 2022-06-15
Attending: INTERNAL MEDICINE | Admitting: INTERNAL MEDICINE
Payer: OTHER GOVERNMENT

## 2022-06-13 VITALS
DIASTOLIC BLOOD PRESSURE: 50 MMHG | HEART RATE: 60 BPM | TEMPERATURE: 97 F | SYSTOLIC BLOOD PRESSURE: 148 MMHG | RESPIRATION RATE: 16 BRPM | OXYGEN SATURATION: 94 %

## 2022-06-13 DIAGNOSIS — N18.9 ACUTE KIDNEY INJURY SUPERIMPOSED ON CHRONIC KIDNEY DISEASE (HCC): Primary | ICD-10-CM

## 2022-06-13 DIAGNOSIS — N17.9 ACUTE KIDNEY INJURY SUPERIMPOSED ON CHRONIC KIDNEY DISEASE (HCC): Primary | ICD-10-CM

## 2022-06-13 LAB
-: ABNORMAL
BACTERIA: ABNORMAL
CASTS UA: ABNORMAL /LPF
CASTS UA: ABNORMAL /LPF
EKG ATRIAL RATE: 41 BPM
EKG ATRIAL RATE: 51 BPM
EKG P AXIS: 45 DEGREES
EKG P AXIS: 52 DEGREES
EKG P-R INTERVAL: 160 MS
EKG P-R INTERVAL: 162 MS
EKG Q-T INTERVAL: 486 MS
EKG Q-T INTERVAL: 492 MS
EKG QRS DURATION: 104 MS
EKG QRS DURATION: 98 MS
EKG QTC CALCULATION (BAZETT): 405 MS
EKG QTC CALCULATION (BAZETT): 447 MS
EKG R AXIS: 6 DEGREES
EKG R AXIS: 6 DEGREES
EKG T AXIS: 11 DEGREES
EKG T AXIS: 14 DEGREES
EKG VENTRICULAR RATE: 41 BPM
EKG VENTRICULAR RATE: 51 BPM
EPITHELIAL CELLS UA: ABNORMAL /HPF
GLUCOSE BLD-MCNC: 110 MG/DL (ref 65–105)
GLUCOSE BLD-MCNC: 159 MG/DL (ref 65–105)
GLUCOSE BLD-MCNC: 94 MG/DL (ref 65–105)
GLUCOSE BLD-MCNC: 97 MG/DL (ref 65–105)
RBC UA: ABNORMAL /HPF
WBC UA: ABNORMAL /HPF

## 2022-06-13 PROCEDURE — 99222 1ST HOSP IP/OBS MODERATE 55: CPT | Performed by: INTERNAL MEDICINE

## 2022-06-13 PROCEDURE — 93010 ELECTROCARDIOGRAM REPORT: CPT | Performed by: INTERNAL MEDICINE

## 2022-06-13 PROCEDURE — 82947 ASSAY GLUCOSE BLOOD QUANT: CPT

## 2022-06-13 PROCEDURE — 6370000000 HC RX 637 (ALT 250 FOR IP): Performed by: INTERNAL MEDICINE

## 2022-06-13 PROCEDURE — 6370000000 HC RX 637 (ALT 250 FOR IP): Performed by: EMERGENCY MEDICINE

## 2022-06-13 PROCEDURE — 6370000000 HC RX 637 (ALT 250 FOR IP): Performed by: NURSE PRACTITIONER

## 2022-06-13 PROCEDURE — 2700000000 HC OXYGEN THERAPY PER DAY

## 2022-06-13 PROCEDURE — 2500000003 HC RX 250 WO HCPCS: Performed by: NURSE PRACTITIONER

## 2022-06-13 PROCEDURE — 2060000000 HC ICU INTERMEDIATE R&B

## 2022-06-13 PROCEDURE — 2580000003 HC RX 258: Performed by: NURSE PRACTITIONER

## 2022-06-13 PROCEDURE — 94761 N-INVAS EAR/PLS OXIMETRY MLT: CPT

## 2022-06-13 RX ORDER — INSULIN LISPRO 100 [IU]/ML
0-12 INJECTION, SOLUTION INTRAVENOUS; SUBCUTANEOUS
Status: DISCONTINUED | OUTPATIENT
Start: 2022-06-13 | End: 2022-06-15 | Stop reason: HOSPADM

## 2022-06-13 RX ORDER — CETIRIZINE HYDROCHLORIDE 10 MG/1
5 TABLET ORAL DAILY
Status: DISCONTINUED | OUTPATIENT
Start: 2022-06-13 | End: 2022-06-15 | Stop reason: HOSPADM

## 2022-06-13 RX ORDER — ALBUTEROL SULFATE 2.5 MG/3ML
2.5 SOLUTION RESPIRATORY (INHALATION) EVERY 6 HOURS PRN
Status: DISCONTINUED | OUTPATIENT
Start: 2022-06-13 | End: 2022-06-15 | Stop reason: HOSPADM

## 2022-06-13 RX ORDER — LANOLIN ALCOHOL/MO/W.PET/CERES
400 CREAM (GRAM) TOPICAL DAILY
Status: DISCONTINUED | OUTPATIENT
Start: 2022-06-13 | End: 2022-06-15 | Stop reason: HOSPADM

## 2022-06-13 RX ORDER — M-VIT,TX,IRON,MINS/CALC/FOLIC 27MG-0.4MG
1 TABLET ORAL DAILY
Status: DISCONTINUED | OUTPATIENT
Start: 2022-06-13 | End: 2022-06-15 | Stop reason: HOSPADM

## 2022-06-13 RX ORDER — BUMETANIDE 1 MG/1
0.5 TABLET ORAL DAILY
Status: DISCONTINUED | OUTPATIENT
Start: 2022-06-13 | End: 2022-06-13

## 2022-06-13 RX ORDER — POLYETHYLENE GLYCOL 3350 17 G/17G
17 POWDER, FOR SOLUTION ORAL DAILY PRN
Status: DISCONTINUED | OUTPATIENT
Start: 2022-06-13 | End: 2022-06-15 | Stop reason: HOSPADM

## 2022-06-13 RX ORDER — ASPIRIN 81 MG/1
81 TABLET ORAL DAILY
Status: DISCONTINUED | OUTPATIENT
Start: 2022-06-14 | End: 2022-06-15 | Stop reason: HOSPADM

## 2022-06-13 RX ORDER — INSULIN LISPRO 100 [IU]/ML
0-6 INJECTION, SOLUTION INTRAVENOUS; SUBCUTANEOUS NIGHTLY
Status: DISCONTINUED | OUTPATIENT
Start: 2022-06-13 | End: 2022-06-15 | Stop reason: HOSPADM

## 2022-06-13 RX ORDER — SODIUM CHLORIDE 9 MG/ML
INJECTION, SOLUTION INTRAVENOUS PRN
Status: DISCONTINUED | OUTPATIENT
Start: 2022-06-13 | End: 2022-06-15 | Stop reason: HOSPADM

## 2022-06-13 RX ORDER — LANOLIN ALCOHOL/MO/W.PET/CERES
325 CREAM (GRAM) TOPICAL
Status: DISCONTINUED | OUTPATIENT
Start: 2022-06-13 | End: 2022-06-15 | Stop reason: HOSPADM

## 2022-06-13 RX ORDER — BUMETANIDE 1 MG/1
1 TABLET ORAL 2 TIMES DAILY
Status: DISCONTINUED | OUTPATIENT
Start: 2022-06-13 | End: 2022-06-15 | Stop reason: HOSPADM

## 2022-06-13 RX ORDER — MAGNESIUM SULFATE 1 G/100ML
1000 INJECTION INTRAVENOUS PRN
Status: DISCONTINUED | OUTPATIENT
Start: 2022-06-13 | End: 2022-06-13

## 2022-06-13 RX ORDER — POTASSIUM CHLORIDE 7.45 MG/ML
10 INJECTION INTRAVENOUS PRN
Status: DISCONTINUED | OUTPATIENT
Start: 2022-06-13 | End: 2022-06-13

## 2022-06-13 RX ORDER — SODIUM CHLORIDE 0.9 % (FLUSH) 0.9 %
5-40 SYRINGE (ML) INJECTION EVERY 12 HOURS SCHEDULED
Status: DISCONTINUED | OUTPATIENT
Start: 2022-06-13 | End: 2022-06-15 | Stop reason: HOSPADM

## 2022-06-13 RX ORDER — ACETAMINOPHEN 325 MG/1
650 TABLET ORAL EVERY 6 HOURS PRN
Status: DISCONTINUED | OUTPATIENT
Start: 2022-06-13 | End: 2022-06-15 | Stop reason: HOSPADM

## 2022-06-13 RX ORDER — ONDANSETRON 2 MG/ML
4 INJECTION INTRAMUSCULAR; INTRAVENOUS EVERY 6 HOURS PRN
Status: DISCONTINUED | OUTPATIENT
Start: 2022-06-13 | End: 2022-06-15 | Stop reason: HOSPADM

## 2022-06-13 RX ORDER — ATORVASTATIN CALCIUM 40 MG/1
40 TABLET, FILM COATED ORAL NIGHTLY
Status: DISCONTINUED | OUTPATIENT
Start: 2022-06-13 | End: 2022-06-15 | Stop reason: HOSPADM

## 2022-06-13 RX ORDER — ONDANSETRON 4 MG/1
4 TABLET, ORALLY DISINTEGRATING ORAL EVERY 8 HOURS PRN
Status: DISCONTINUED | OUTPATIENT
Start: 2022-06-13 | End: 2022-06-15 | Stop reason: HOSPADM

## 2022-06-13 RX ORDER — SODIUM CHLORIDE 0.9 % (FLUSH) 0.9 %
10 SYRINGE (ML) INJECTION PRN
Status: DISCONTINUED | OUTPATIENT
Start: 2022-06-13 | End: 2022-06-15 | Stop reason: HOSPADM

## 2022-06-13 RX ORDER — POTASSIUM CHLORIDE 20 MEQ/1
40 TABLET, EXTENDED RELEASE ORAL PRN
Status: DISCONTINUED | OUTPATIENT
Start: 2022-06-13 | End: 2022-06-13

## 2022-06-13 RX ORDER — DEXTROSE MONOHYDRATE 50 MG/ML
100 INJECTION, SOLUTION INTRAVENOUS PRN
Status: DISCONTINUED | OUTPATIENT
Start: 2022-06-13 | End: 2022-06-15 | Stop reason: HOSPADM

## 2022-06-13 RX ORDER — AMIODARONE HYDROCHLORIDE 200 MG/1
200 TABLET ORAL DAILY
Status: DISCONTINUED | OUTPATIENT
Start: 2022-06-13 | End: 2022-06-13

## 2022-06-13 RX ORDER — ACETAMINOPHEN 650 MG/1
650 SUPPOSITORY RECTAL EVERY 6 HOURS PRN
Status: DISCONTINUED | OUTPATIENT
Start: 2022-06-13 | End: 2022-06-15 | Stop reason: HOSPADM

## 2022-06-13 RX ORDER — ERGOCALCIFEROL 1.25 MG/1
50000 CAPSULE ORAL WEEKLY
Status: DISCONTINUED | OUTPATIENT
Start: 2022-06-19 | End: 2022-06-15 | Stop reason: HOSPADM

## 2022-06-13 RX ADMIN — APIXABAN 2.5 MG: 5 TABLET, FILM COATED ORAL at 00:42

## 2022-06-13 RX ADMIN — SODIUM CHLORIDE, PRESERVATIVE FREE 10 ML: 5 INJECTION INTRAVENOUS at 20:14

## 2022-06-13 RX ADMIN — CETIRIZINE HYDROCHLORIDE 5 MG: 10 TABLET ORAL at 08:36

## 2022-06-13 RX ADMIN — FERROUS SULFATE TAB EC 325 MG (65 MG FE EQUIVALENT) 325 MG: 325 (65 FE) TABLET DELAYED RESPONSE at 08:36

## 2022-06-13 RX ADMIN — ACETAMINOPHEN 650 MG: 325 TABLET ORAL at 08:35

## 2022-06-13 RX ADMIN — HYDRALAZINE HYDROCHLORIDE 75 MG: 50 TABLET, FILM COATED ORAL at 08:36

## 2022-06-13 RX ADMIN — INSULIN LISPRO 2 UNITS: 100 INJECTION, SOLUTION INTRAVENOUS; SUBCUTANEOUS at 12:23

## 2022-06-13 RX ADMIN — BUMETANIDE 1 MG: 1 TABLET ORAL at 20:14

## 2022-06-13 RX ADMIN — HYDRALAZINE HYDROCHLORIDE 75 MG: 50 TABLET, FILM COATED ORAL at 20:14

## 2022-06-13 RX ADMIN — APIXABAN 2.5 MG: 2.5 TABLET, FILM COATED ORAL at 08:36

## 2022-06-13 RX ADMIN — ANTI-FUNGAL POWDER MICONAZOLE NITRATE TALC FREE: 1.42 POWDER TOPICAL at 08:39

## 2022-06-13 RX ADMIN — HYDRALAZINE HYDROCHLORIDE 75 MG: 50 TABLET, FILM COATED ORAL at 16:39

## 2022-06-13 RX ADMIN — APIXABAN 2.5 MG: 2.5 TABLET, FILM COATED ORAL at 20:17

## 2022-06-13 RX ADMIN — BUMETANIDE 1 MG: 1 TABLET ORAL at 08:36

## 2022-06-13 RX ADMIN — Medication 1 TABLET: at 09:54

## 2022-06-13 RX ADMIN — ANTI-FUNGAL POWDER MICONAZOLE NITRATE TALC FREE: 1.42 POWDER TOPICAL at 20:15

## 2022-06-13 RX ADMIN — DESMOPRESSIN ACETATE 40 MG: 0.2 TABLET ORAL at 20:14

## 2022-06-13 RX ADMIN — AMIODARONE HYDROCHLORIDE 200 MG: 200 TABLET ORAL at 08:36

## 2022-06-13 RX ADMIN — SODIUM CHLORIDE, PRESERVATIVE FREE 10 ML: 5 INJECTION INTRAVENOUS at 08:37

## 2022-06-13 ASSESSMENT — ENCOUNTER SYMPTOMS
CHEST TIGHTNESS: 0
DIARRHEA: 0
NAUSEA: 0
ABDOMINAL PAIN: 0
COLOR CHANGE: 0
VOMITING: 0
WHEEZING: 1
CONSTIPATION: 0
SHORTNESS OF BREATH: 1

## 2022-06-13 ASSESSMENT — PAIN SCALES - GENERAL
PAINLEVEL_OUTOF10: 5
PAINLEVEL_OUTOF10: 0
PAINLEVEL_OUTOF10: 0

## 2022-06-13 ASSESSMENT — PAIN DESCRIPTION - DESCRIPTORS: DESCRIPTORS: ACHING

## 2022-06-13 ASSESSMENT — PAIN DESCRIPTION - LOCATION: LOCATION: HEAD

## 2022-06-13 NOTE — PROGRESS NOTES
Physician Progress Note      Chris TUCKER #:                  [id number]  :                       1950  ADMIT DATE:       2022 1:27 AM  DISCH DATE:  RESPONDING  PROVIDER #:        JARROD NÚÑEZ DO          QUERY TEXT:    Pt admitted with Bradycardia. Pt noted to have O2 2L NC with sats 81-97%. If   possible, please document in the progress notes and discharge summary if you   are evaluating and/or treating any of the following: The medical record reflects the following:  Risk Factors:  Chronic Diastolic CHF, CKD, HTN, Hx Smoking quit 22yrs ago,   CAD, CABG, Intubation 3/7/22  Clinical Indicators: BNP 10,293, Trop 37>40, CXR No Acute. Pulmonary sequela   typical of that seen with smoking, including possible COPD; correlate with   clinical history. 3. Calcific atherosclerosis aorta. 4. Cardiomegaly. H/P She   reports still some dyspnea and edema from her recent hospitalization. #Chronic diastolic HF -just DC from hospital 1 day prior -ProBNP elevated over   baseline with JOY on CKD -BLT crackles. #JOY on CKD3 -Cr on admission 2.3,   BUN 74, bicarb 74, K is 5.3 -Baseline around 1.7 -Concern for mild CHF -Admin   diuretics , albumin 2.2 -concern for CHF develop  Treatment: CXR, Oxygen, labs, monitor  Options provided:  -- Acute respiratory failure with hypoxia  -- Acute respiratory failure with hypercapnia  -- Acute respiratory failure with hypoxia and hypercapnia  -- Other - I will add my own diagnosis  -- Disagree - Not applicable / Not valid  -- Disagree - Clinically unable to determine / Unknown  -- Refer to Clinical Documentation Reviewer    PROVIDER RESPONSE TEXT:    This patient is in acute respiratory failure with hypoxia. Query created by:  Sharlene Yoo on 2022 9:35 AM      Electronically signed by:  Mariya Richardson DO 2022 9:41 AM

## 2022-06-13 NOTE — CONSULTS
[facility]   Consult Note         Today's Date: 6/13/2022  Patient Name: Leah Leonard  Date of admission: 6/13/2022  1:27 AM  Patient's age: 66 y.o., 1950  Admission Dx: Bradycardia [R00.1]    Reason for Consult:  Cardiac evaluation    Requesting Physician: Rachel Vega DO    REASON FOR CONSULT:  Bradycardia    History Obtained From:  Patient, chart, staff, records    HISTORY OF PRESENT ILLNESS:      The patient is a 66 y.o. female who is admitted to the hospital for Arrhythmia  Patient presents for evaluation of slow heart rate. Onset was 1 day ago, and patient reports symptoms have gradually improved since that time. Patient also complains of slow heart beat. The patient denies abdominal pain, chest pain, cough, palpitations, shortness of breath and syncope. The patient has a past history of atrial fibrillation, CABG, CAD, CHF and DM. The patient denies a past history of pacemaker and PE.     PMH CAD, s/p CABG 2003, (patent LIMA-LAD and SVG-RCA in 2006), paroxysmal afib, chadvasc score 5 on metoprolol and amiodarone, CVA, CHF echo march 2022 EF 60-65% on Bumex, cardioversion 7/2020 for afib. Patient was recently discharged from hospital , but developed with allergy at home, together with bradycardia 29 and was brought in by EMS. No loss of consciousness. She has intermittent urinary incontinence. Has pedal edema,   /63, on room air, pulse 68->59, Cr 2.3, BUN 74, BNP 56497, trop 37->40, glycemia 203, tsh 5.5, Hb 10.9, INR 1.1  EKG revealed sinus bradycardia, PA interval 168. Overnight she had bradycardia for about 35, associated with a pause of about 2.5 seconds during sleep which was brief. Blood pressure remained stable.       Past Medical History:   has a past medical history of Acute CVA (cerebrovascular accident) ([address] Altered mental status, Arthritis, Brain mass, Cellulitis and abscess, Cellulitis and abscess of unspecified site, Cellulitis of both lower extremities, Cellulitis of left lower extremity, Cellulitis of lower extremity, CHF (congestive heart failure) ([facility] Chronic kidney disease, unspecified, Coronary atherosclerosis of native coronary artery, Essential hypertension, Essential hypertension, malignant, Hallucinations, Hard of hearing, Head contusion, Hypokalemia, Iron deficiency anemia, unspecified, Meningioma ([facility] [address] Myocardial infarction ([facility] [address] Other and unspecified hyperlipidemia, Pure hypercholesterolemia, Toxic metabolic encephalopathy, Type II or unspecified type diabetes mellitus without mention of complication, not stated as uncontrolled, Unspecified asthma(493.90), Unspecified venous (peripheral) insufficiency, Unspecified vitamin D deficiency, and UTI (urinary tract infection). Past Surgical History:   has a past surgical history that includes Tonsillectomy and adenoidectomy; Coronary artery bypass graft; intubation (3/7/2022); Thoracentesis (3/10/2022); Upper gastrointestinal endoscopy (N/A, 3/15/2022); and Colonoscopy (N/A, 3/15/2022). Home Medications:    Prior to Admission medications    Medication Sig Start Date End Date Taking? Authorizing Provider   amLODIPine (NORVASC) 10 MG tablet Take 1 tablet by mouth nightly 6/11/22   Li Yap MD   hydrALAZINE (APRESOLINE) 25 MG tablet Take 3 tablets by mouth 3 times daily 6/11/22   Li Yap MD   bumetanide (BUMEX) 0.5 MG tablet Take 1 tablet by mouth daily 6/11/22   Li Yap MD   Blood Pressure KIT Diagnosis: HTN. Needs to check blood pressure 1-2 times a day until stable, then once a day.  Goal blood pressure less than 135/85, and above 110/60. 5/26/22   Teresa Simms MD   saline nasal gel (AYR) GEL by Nasal route as needed for Congestion For nose bleeds, 2-3 times a day intranasal prn 4/28/22   Teresa Simms MD   vitamin D (ERGOCALCIFEROL) 1.25 MG (41687 UT) CAPS capsule Take 1 capsule by mouth once a week Sundays 4/28/22   Teresa Simms MD   amiodarone (CORDARONE) 200 MG tablet Take 1 tablet by mouth daily 4/15/22   Governor Ashu MD   ferrous sulfate (IRON 325) 325 (65 Fe) MG tablet Take 1 tablet by mouth daily (with breakfast) 3/31/22   Nico Laurent MD   apixaban Lupillo Marshall) 2.5 MG TABS tablet Take 1 tablet by mouth 2 times daily 3/16/22   Chapis Dominguez MD   Nebulizers (COMPRESSOR/NEBULIZER) MISC Nebulizer with compressor. Disposable Med Nebs 2 /month. Reusable Med Nebs 1 per 6 months. Aerosol Mask 1 per month. Replacement Filters 2 per month. All other related supplies as needed per month. Medications being used: Albuterol . 083 unit dose vial. Frequency: every 6 hrs x 4/day . Length of Need: 1 2/22/22   Nico Laurent MD   albuterol (PROVENTIL) (2.5 MG/3ML) 0.083% nebulizer solution Take 3 mLs by nebulization every 6 hours as needed for Wheezing or Shortness of Breath 2/22/22   Nico Laurent MD   albuterol sulfate HFA (PROAIR HFA) 108 (90 Base) MCG/ACT inhaler Inhale 2 puffs into the lungs every 6 hours as needed for Wheezing or Shortness of Breath (cough) 2/22/22   Nico Laurent MD   [facility].  Devices (ADJUST FOLD CANE/YORK HANDLE) MISC Use daily for walking 2/22/22   Nico Laurent MD   Handicap Placard MISC by Does not apply route Expires on 12/30/25 2/22/22   Nico Laurent MD   atorvastatin (LIPITOR) 40 MG tablet Take 1 tablet by mouth once daily 2/15/22   Governor Ashu MD   desloratadine (CLARINEX) 5 MG tablet Take 1 tablet by mouth once daily 2/15/22   MD Kurt Galvin MISC 1 each by Does not apply route daily Patient needs to contact office before any further refills will be approved 2/15/22   Governor Ashu MD   magnesium oxide (MAG-OX) 400 (241.3 Mg) MG TABS tablet Take 1 tablet by mouth twice daily 2/15/22   Governor Ashu MD   metoprolol tartrate (LOPRESSOR) 50 MG tablet Take 1 tablet by mouth 2 times daily 2/15/22   Governor MD Ashu   miconazole (MICOTIN) 2 % powder Apply topically 2 times daily · Eyes: No visual changes or diplopia. No scleral icterus. · ENT: No Headaches, hearing loss or vertigo. No mouth sores or sore throat. · Cardiovascular: per HPI  · Respiratory: per HPI  · Gastrointestinal: No abdominal pain, appetite loss, blood in stools. No change in bowel or bladder habits. · Genitourinary: No dysuria, trouble voiding, or hematuria. · Musculoskeletal:  No gait disturbance, No weakness or joint complaints. · Integumentary: No rash or pruritis. · Neurological: No headache, diplopia, change in muscle strength, numbness or tingling. No change in gait, balance, coordination, mood, affect, memory, mentation, behavior. · Psychiatric: No anxiety, or depression. · Endocrine: No temperature intolerance. No excessive thirst, fluid intake, or urination. No tremor. · Hematologic/Lymphatic: No abnormal bruising or bleeding, blood clots or swollen lymph nodes. · Allergic/Immunologic: No nasal congestion or hives. PHYSICAL EXAM:      BP (!) 155/63   Pulse 64   Temp 97.4 °F (36.3 °C)   Resp 17   Ht 4' 11\" (1.499 m)   Wt 152 lb 1.9 oz (69 kg)   SpO2 97%   BMI 30.72 kg/m²    Constitutional and General Appearance: alert, cooperative, no distress and appears stated age  HEENT: PERRL, no cervical lymphadenopathy. No masses palpable. Normal oral mucosa  Respiratory:  · Normal excursion and expansion without use of accessory muscles  · Resp Auscultation: Good respiratory effort. No for increased work of breathing.  On auscultation: clear to auscultation bilaterally  Cardiovascular:  · The apical impulse is not displaced  · Heart tones are crisp and normal. regular S1 and S2.  · Jugular venous pulsation Normal  · The carotid upstroke is normal in amplitude and contour without delay or bruit  · Peripheral pulses are symmetrical and full   Abdomen:   · No masses or tenderness  · Bowel sounds present  Extremities:  ·  No Cyanosis or Clubbing  ·  Lower extremity edema: No  ·  Skin: Warm and dry  Neurological:  · Alert and oriented. · Moves all extremities well  · No abnormalities of mood, affect, memory, mentation, or behavior are noted        EKG:    Date: 06/13/22  Reading: No acute ischemia      LAST ECHO:  Date:  Findings Summary:      LAST Stress Test:   Date of last ST:  Major Findings:    LAST Cardiac Angiography:.  Date:  Findings:      Labs:     CBC:   Recent Labs     06/11/22  0606 06/12/22  1528   WBC 7.3 6.1   HGB 10.8* 10.9*   HCT 33.0* 34.7*    200     BMP:   Recent Labs     06/11/22  0743 06/12/22  1528    137   K 4.7 5.3   CO2 23 21   BUN 62* 74*   CREATININE 1.85* 2.30*   LABGLOM 27* 21*   GLUCOSE 116* 203*     BNP: No results for input(s): BNP in the last 72 hours. PT/INR:   Recent Labs     06/12/22  1528   PROTIME 13.8   INR 1.1     APTT:  Recent Labs     06/12/22  1528   APTT 31.6     CARDIAC ENZYMES:No results for input(s): CKTOTAL, CKMB, CKMBINDEX, TROPONINI in the last 72 hours. FASTING LIPID PANEL:  Lab Results   Component Value Date    HDL 59 12/06/2021    TRIG 132 12/06/2021     LIVER PROFILE:  Recent Labs     06/12/22  1528   AST 42*   ALT 22   LABALBU 2.0*     Troponins: Invalid input(s): TROPONIN     Other Current Problems  Patient Active Problem List   Diagnosis    Vitamin D deficiency    Venous insufficiency of both lower extremities    Asthma    Type 2 diabetes mellitus with stage 3 chronic kidney disease, without long-term current use of insulin ([address]    Coronary artery disease involving native coronary artery of native heart without angina pectoris    Iron deficiency anemia    Stage 3 chronic kidney disease ([address]    Colonoscopy refused    Pneumococcal vaccination declined    Partial deafness of both ears    Chronic diastolic CHF (congestive heart failure) ([address]    COPD (chronic obstructive pulmonary disease) [facility]    HTN.  Benign hypertension with CKD (chronic kidney disease) stage III (HCC)    Gout with tophi    Hyperlipidemia with target LDL less than 70    Dry skin dermatitis HANDS    Hypertensive emergency    Meningioma, cerebral (HCC)    Paroxysmal atrial fibrillation (HCC)    Influenza vaccination declined    Recurrent UTI    Elevated LFTs    Cellulitis of both lower extremities    Hypomagnesemia    Kidney stones    Diverticulosis    COVID-19 vaccination declined    JOY (acute kidney injury) ([address]    Acute on chronic combined systolic (congestive) and diastolic (congestive) heart failure (HCC)    Chronic a-fib (HCC)    Chronic venous stasis dermatitis of both lower extremities    Symptomatic anemia    Family history of colon cancer    Family history of pancreatic cancer    Mild malnutrition (HCC)    Decreased strength, endurance, and mobility    Bilateral lower extremity edema    Non-pressure chronic ulcer of other part of left lower leg limited to breakdown of skin (HCC)    Bleeding nose    Unintended weight loss    Lymphedema    Peripheral vascular disease, unspecified ([address]    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 ([address]    Acute kidney injury superimposed on chronic kidney disease ([address]    Anticoagulated    Hypertensive urgency with joy    Bradycardia           IMPRESSION & Recommendations:    1. Sinus bradycardia likely due to amiodarone and metoprolol. 2. CABG with LIMA-LAD, SVG-OM and SVG-RCA in 2003. Occluded SVG-OM2 by heart catheterization in 2006. 3. LV dysfunction, resolved, echo march 2022 EF 60-65%    4. Hyperlipidemia. 5. Diabetes. function 64% and no ischemia. 6. 7/28/20 [address] w/ Celllulitis/sepsis. Afib RVR. S/p cardioversion 7/2020    Plan   1. Hold metoprolol. 2. Discontinue amiodarone. 3. Monitor HR for 24-48 hours, plan to resume low dose metoprolol  4. Plan Holter monitor  3. Chronic HFpEF. Continue  Bumex  5. Follow up with Cardiologist as scheduled. Discussed with patient, family, and Nurse.     Electronically signed by Silvana Bundy Ronaldo Harman MD on 6/13/2022 at 1:37 PM    Jes Pearce MD  Internal Medicine Resident, [facility]; [place], [place]  6/13/2022, 1:37 PM       Attending Physician Statement  I have discussed the care of [address] [place] [address] Drive, including pertinent history and exam findings,  with the cardiology fellow/resident. I have seen and examined the patient and the key elements of all parts of the encounter have been performed by me. I have completed at least one if not all key elements of the E/M (history, physical exam, and MDM). I agree with the assessment, plan and orders as documented by the resident with additional recommendations as below:       Hx of ASCVD post CABG and PAF admitted with lethargy, fatigue and slurring of speech, found to have bradycardia with HR in 30's, takes lopressor 50 mg bid and amiodarone 200 mg qd. HR improved since admission, now ranging 60-70, rhythm normal sinus. Overnight telemetry showed pause of around 4 seconds. Clinically she is back to baseline this AM. Alert and oriented. No chest pain. No volume overload on examination.    D/c amiodarone and continue to hold lopressor  Will start low dose BB tomorrow am if HR > 70   Will need holter monitor x 48 hours on discharge to r/o any further pauses  Continue eliquis, bumex, asa, and statin   Discussed with patients daughter       Reid Sherwood MD, Olegario Jasso

## 2022-06-13 NOTE — PROGRESS NOTES
[facility]  Documentation Note                Admission Dx: Bradycardia [R00.1]    Past Medical History:   has a past medical history of Acute CVA (cerebrovascular accident) ([facility] [address] [facility] Altered mental status, Arthritis, Brain mass, Cellulitis and abscess, Cellulitis and abscess of unspecified site, Cellulitis of both lower extremities, Cellulitis of left lower extremity, Cellulitis of lower extremity, CHF (congestive heart failure) ([address] Chronic kidney disease, unspecified, Coronary atherosclerosis of native coronary artery, Essential hypertension, Essential hypertension, malignant, Hallucinations, Hard of hearing, Head contusion, Hypokalemia, Iron deficiency anemia, unspecified, Meningioma ([facility] [address] [facility] Myocardial infarction ([facility] [address] [facility] Other and unspecified hyperlipidemia, Pure hypercholesterolemia, Toxic metabolic encephalopathy, Type II or unspecified type diabetes mellitus without mention of complication, not stated as uncontrolled, Unspecified asthma(493.90), Unspecified venous (peripheral) insufficiency, Unspecified vitamin D deficiency, and UTI (urinary tract infection). Previous Testing:     LIMITED ECHO 3/14/2022: EF 60-65%     ECHO 2/7/2022: EF 55%, moderate LVH, mild-moderate AI, mild MR/TR, normal RVSP. KARLA/CV 7/30/2020: Normal LVSF. No thrombus/veg. Successful CV to NSR. STRESS 6/6/13: No ischemia/infarct. EF 69%.      CATH 7/21/06:   LM: no significant disease. LAD: mid 95% stenosis   LIMA-LAD is patent   LCx: normal  OM1: luminal irregularities without critical obstruction   OM2: proximal 90% stenosis, long lesion   SVG-OM2 is occluded   PLB: no significant disease  RCA: totally occluded   SVG-RCA is patent     CABG 2003: LIMA-LAD, SVG-OM, SVG-RCA     Previous office/hospital visit:   Mable Paul NP 5/31/2022:   1. CABG with LIMA-LAD, SVG-OM and SVG-RCA in 2003. 2. Occluded SVG-OM2 by heart catheterization in 2006. 3. LV dysfunction, mild severity. 4. Hyperlipidemia.   5. Diabetes. 6. Stress test in April 2011 suggested old lateral infarction, small area, with preserved LV  function 64% and no ischemia. 7. 7/28/20 [address] w/ Celllulitis/sepsis. Afib RVR. 8. 7/31/20- KARLA/CV 200J x1. Eliquis, LVEF 55%, moderate atheromatous disease in the arch, moderate AI, mild MR mild TR9. 03/14/2022 limited study echo LVEF 60-65% mildly dilated LA  10. Admitted in [facility] on 03/01/2022, hemoglobin was 4.1 according to the daughter status post blood transfusion  11. ECHO 2/7/2022 Normal LV size. LVEF > 55%. No WMA. Moderate LVH. Normal RV size and function. LA dilatation. AV trileaflet. Mild AS. Mild-moderate AI. Normal Aortic root dimension. MAC Mild MR Mild TR. Normal RVSP. No PCE. Pleural effusion present. Plan --   1. CAD Chest pain free. Continue ASA, statin, BB  2. PAF Continue BB, amiodarone and Eliquis  3. HTN Continue BB. Will add hydralazine 10 mg TID. 4. Chronic HFpEF. Continue BB and Bumex  5. Follow up with Cardiologist as scheduled.     Milton Paulson [facility]

## 2022-06-13 NOTE — ED NOTES
Report called to [facility]'s. Spoke with Francine Ryan RN.      Zacarias Lara RN  06/13/22 [id number]

## 2022-06-13 NOTE — CARE COORDINATION
Case Management Initial Discharge Plan  Shad Moy             Met with:patient or family member patient and daughter to discuss discharge plans. Information verified: address, contacts, phone number, , insurance Yes  Insurance Provider: Medicare part Garry Snowden: No    Emergency Contact/Next of Kin name & number:   Bhavin Causey (daughter) [phone]  Krys Meyer (daughter) [phone]  Who are involved in patient's support system? children    PCP: Jany Ramachandran MD  Date of last visit: last month      Discharge Planning    Living Arrangements:  [address] 2 [address]  3 stairs to climb to get into front door, 14 stairs to climb to reach second floor  Location of bedroom/bathroom in home main    Patient able to perform ADL's:Assisted    Current Services (outpatient & in home) none  DME equipment: straight cane, walker, wheelchair and other lift chair, oxygen (portable concentrator doesn't know how many liters)  DME provider: Lisbeth Edward    Is patient receiving oral anticoagulation therapy? Yes- Eliquis    If indicated:   Physician managing anticoagulation treatment:   Where does patient obtain lab work for ATC treatment? Does patient have any issues/concerns obtaining medications? No  If yes, what are patient's concerns? Is there a preferred Pharmacy after hours or on weekends? Yes    If yes, which pharmacy? [address]    Potential Assistance Needed:  Home Care    Patient agreeable to home care: No  Clarion of choice provided:  N/A    Prior SNF/Rehab Placement and Facility:   Agreeable to SNF/Rehab: No  Clarion of choice provided: n/a     Evaluation: no    Expected Discharge date:       Patient expects to be discharged to:   home    If home: is the family and/or caregiver wiling & able to provide support at home? yes  Who will be providing this support?  Children    Follow Up Appointment: Best Day/ Time: Monday AM    Transportation provider: children  Transportation arrangements needed for discharge: No    Readmission Risk              Risk of Unplanned Readmission:  39             Does patient have a readmission risk score greater than 14?: Yes  If yes, follow-up appointment must be made within 7 days of discharge. Goals of Care:       Educated patient on transitional options, provided freedom of choice and are agreeable with plan      Discharge Plan: d/c home with children, pt does not have SNF coverage through insurance, declines HC.  Has PCP and ride          Electronically signed by Yves Zuniga RN on 6/13/22 at 11:18 AM EDT

## 2022-06-13 NOTE — PROGRESS NOTES
Physician Progress Note      PATIENTWale Seen  CSN #:                  [id number]  :                       1950  ADMIT DATE:       2022 1:27 AM  DISCH DATE:  RESPONDING  PROVIDER #:        JARROD NÚÑEZ DO          QUERY TEXT:    Pt admitted with bradycardia and has chronic diastolic CHF documented. If   possible, please document in progress notes and discharge summary further   specificity regarding the type and acuity of CHF:      The medical record reflects the following:  Risk Factors:  Chronic Diastolic CHF, CKD, HTN, Hx Smoking quit 22yrs ago,   CAD, CABG, Intubation 3/7/22  Clinical Indicators: BNP 10,293, Trop 37>40, CXR No Acute. Pulmonary sequela   typical of that seen with smoking, including possible COPD;  3. Calcific   atherosclerosis aorta. 4. Cardiomegaly. H/P Chronic diastolic HF. ProBNP   elevated over baseline with JOY on CKD -BLT crackles. JOY on CKD3 -Cr on   admission 2.3, BUN 74, bicarb 74, K is 5.3 -Baseline around 1.7 -Concern for   mild CHF -Admin diuretics , albumin 2.2 -concern for CHF development. Will   admin bumex at increase dose. CXR with some vascular congestion. Trace   pitting edema. Treatment: CXR, Oxygen, labs, monitor  Options provided:  -- Acute on Chronic Diastolic CHF/HFpEF  -- Acute Diastolic CHF/HFpEF  -- Chronic Diastolic CHF/HFpEF  -- Other - I will add my own diagnosis  -- Disagree - Not applicable / Not valid  -- Disagree - Clinically unable to determine / Unknown  -- Refer to Clinical Documentation Reviewer    PROVIDER RESPONSE TEXT:    This patient is in acute on chronic diastolic CHF/HFpEF. Query created by:  Keena Cortez on 2022 2:07 PM      Electronically signed by:  Roxann Varela DO 2022 4:43 PM

## 2022-06-13 NOTE — H&P
@[facility] Center[facility]@    [address] [place] [address]    HISTORY AND PHYSICAL EXAMINATION            Date:   6/13/2022  Patient name:  Kishore Ortiz  Date of admission:  6/13/2022  1:27 AM  MRN:   [id number]  Account:  [de-identified]  YOB: 1950  PCP:    Angy Mckenna MD  Room:   [address] [place] [address]  Code Status:    Full Code    Chief Complaint:     Fatigue    History of Present Illness:     66year old female with diastolic CHF, a fib on AC, HTN, PVD, DM and CKD who was recently discharged from hospital for a CHF exacerbation. At that time her metoprolol and norvasc was increased prior to discharged. Patient went home and reported vague symptoms such as fatigue. Work up revealed a low HR. Patient was taken to Local ER where she was found to have sinus bradycardia. Transferred to a facility with cardio services for concern for possible pacer placement however etiology appears secondary to medication and no signs of ischemia. Patients HR has been trending back up. Her metoprolol and amlodipine are still on hold. Patient denies any Chest pain, nausea, vomit, diaphoresis, cough, fever. She reports still carolee dyspnea and edema from her recent hospitalization.   Admitted for further work up and evaluation     Past Medical History:     Past Medical History:   Diagnosis Date    Acute CVA (cerebrovascular accident) ([facility] [address] 7/25/2020    Altered mental status 5/26/2021    Arthritis     Brain mass     Cellulitis and abscess     Cellulitis and abscess of unspecified site     Cellulitis of both lower extremities 5/26/2021    Cellulitis of left lower extremity 7/26/2020    Cellulitis of lower extremity 10/4/2020    CHF (congestive heart failure) (HCC)     Chronic kidney disease, unspecified     Coronary atherosclerosis of native coronary artery     Essential hypertension 7/25/2020    Essential hypertension, malignant     Hallucinations 2/18/2021    Hard of hearing     Head contusion 9/9/2020    Hypokalemia 9/9/2020    Iron deficiency anemia, unspecified     Meningioma ([facility] 2/25/2021    Myocardial infarction ([facility]     Other and unspecified hyperlipidemia     Pure hypercholesterolemia     Toxic metabolic encephalopathy 4/79/4518    Type II or unspecified type diabetes mellitus without mention of complication, not stated as uncontrolled     Unspecified asthma(493.90)     Unspecified venous (peripheral) insufficiency     Unspecified vitamin D deficiency     UTI (urinary tract infection) 2/18/2021        Past Surgical History:     Past Surgical History:   Procedure Laterality Date    COLONOSCOPY N/A 3/15/2022    COLONOSCOPY DIAGNOSTIC performed by Jeanetta Pallas, MD at [facility].      x 3, Dr. Fabrizio Izaguirre  3/7/2022         THORACENTESIS  3/10/2022         TONSILLECTOMY AND ADENOIDECTOMY      UPPER GASTROINTESTINAL ENDOSCOPY N/A 3/15/2022    EGD BIOPSY performed by Jeanetta Pallas, MD at [address] [place] [address]        Medications Prior to Admission:     Prior to Admission medications    Medication Sig Start Date End Date Taking? Authorizing Provider   amLODIPine (NORVASC) 10 MG tablet Take 1 tablet by mouth nightly 6/11/22   Erin Ortiz MD   hydrALAZINE (APRESOLINE) 25 MG tablet Take 3 tablets by mouth 3 times daily 6/11/22   Erin Ortiz MD   bumetanide (BUMEX) 0.5 MG tablet Take 1 tablet by mouth daily 6/11/22   Erin Ortiz MD   Blood Pressure KIT Diagnosis: HTN. Needs to check blood pressure 1-2 times a day until stable, then once a day.  Goal blood pressure less than 135/85, and above 110/60. 5/26/22   Zelda Purcell MD   saline nasal gel (AYR) GEL by Nasal route as needed for Congestion For nose bleeds, 2-3 times a day intranasal prn 4/28/22   Zelda Purcell MD   vitamin D (ERGOCALCIFEROL) 1.25 MG (86488 UT) CAPS capsule Take 1 capsule by mouth once a week Sundays 4/28/22   Zelda Purcell MD   amiodarone (CORDARONE) 200 MG tablet Take 1 tablet by mouth daily 4/15/22   Jacqueilne Granado MD   ferrous sulfate (IRON 325) 325 (65 Fe) MG tablet Take 1 tablet by mouth daily (with breakfast) 3/31/22   Rosilyn Mortimer, MD   [facility]) 2.5 MG TABS tablet Take 1 tablet by mouth 2 times daily 3/16/22   Bryant Ellsworth MD   Nebulizers (COMPRESSOR/NEBULIZER) MISC Nebulizer with compressor. Disposable Med Nebs 2 /month. Reusable Med Nebs 1 per 6 months. Aerosol Mask 1 per month. Replacement Filters 2 per month. All other related supplies as needed per month. Medications being used: Albuterol . 083 unit dose vial. Frequency: every 6 hrs x 4/day . Length of Need: 1 2/22/22   Rosilyn Mortimer, MD   albuterol (PROVENTIL) (2.5 MG/3ML) 0.083% nebulizer solution Take 3 mLs by nebulization every 6 hours as needed for Wheezing or Shortness of Breath 2/22/22   Rosilyn Mortimer, MD   albuterol sulfate HFA (PROAIR HFA) 108 (90 Base) MCG/ACT inhaler Inhale 2 puffs into the lungs every 6 hours as needed for Wheezing or Shortness of Breath (cough) 2/22/22   Rosilyn Mortimer, MD   [facility].  Devices (ADJUST FOLD CANE/YORK HANDLE) MISC Use daily for walking 2/22/22   Rosilyn Mortimer, MD   Handicap Placard MISC by Does not apply route Expires on 12/30/25 2/22/22   Rosilyn Mortimer, MD   atorvastatin (LIPITOR) 40 MG tablet Take 1 tablet by mouth once daily 2/15/22   Jacqueline Granado MD   desloratadine (CLARINEX) 5 MG tablet Take 1 tablet by mouth once daily 2/15/22   MD Kurt White MISC 1 each by Does not apply route daily Patient needs to contact office before any further refills will be approved 2/15/22   Jacqueline Granado MD   magnesium oxide (MAG-OX) 400 (241.3 Mg) MG TABS tablet Take 1 tablet by mouth twice daily 2/15/22   Jacqueline Granado MD   metoprolol tartrate (LOPRESSOR) 50 MG tablet Take 1 tablet by mouth 2 times daily 2/15/22   Jacqueline Granado MD   miconazole (MICOTIN) 2 % powder Apply topically 2 times daily UNDER THE SKIN FOLDS LONG TERM 2/15/22   Nate Kumari MD   Multiple Vitamins-Minerals (THERAPEUTIC MULTIVITAMIN-MINERALS) tablet Take 1 tablet by mouth daily 2/15/22   Nate Kumari MD   blood glucose test strips (FREESTYLE LITE) strip 1 each by In Vitro route daily As needed. 3/19/21   Nate Kumari MD   Blood Pressure KIT 1 kit by Does not apply route three times daily 19   Laura Hay MD   blood glucose monitor kit and supplies 1 kit by Other route three times daily Dispense Butterfly Elite CBG Device 19   Luis Fernando Lopez MD        Allergies:     Latex, Aleve [naproxen sodium], Pioglitazone, Claritin [loratadine], Keflex [cephalexin], and Lisinopril    Social History:     Tobacco:    reports that she quit smoking about 22 years ago. She has a 2.50 pack-year smoking history. She has never used smokeless tobacco.  Alcohol:      reports previous alcohol use. Drug Use:  reports no history of drug use. Family History:     Family History   Problem Relation Age of Onset    Diabetes Mother     Heart Disease Mother     Heart Disease Father     Diabetes Sister     High Blood Pressure Sister     Diabetes Maternal Grandmother        Review of Systems:     Positive and Negative as described in HPI. ROS negative other then the above noted     Physical Exam:   BP (!) 127/44   Pulse 59   Temp 99 °F (37.2 °C) (Oral)   Resp 14   Ht 4' 11\" (1.499 m)   Wt 152 lb 1.9 oz (69 kg)   SpO2 93%   BMI 30.72 kg/m²   Temp (24hrs), Av.5 °F (36.9 °C), Min:97 °F (36.1 °C), Max:99.5 °F (37.5 °C)    No results for input(s): POCGLU in the last 72 hours.     Intake/Output Summary (Last 24 hours) at 2022 9632  Last data filed at 2022 0330  Gross per 24 hour   Intake --   Output 300 ml   Net -300 ml       General Appearance: AO3, weak and soft spoken, pleasant without any immanent distress   Mental status: oriented to person, place, and time  Head: normocephalic, atraumatic  Eye: no icterus, redness, pupils equal and reactive, extraocular eye movements intact, conjunctiva clear  Ear: normal external ear, no discharge, hearing intact  Nose: no drainage noted  Mouth: mucous membranes moist  Neck: supple, no carotid bruits, thyroid not palpable  Lungs: Bilateral equal air entry, clear to ausculation, no wheezing or rhonchi. Crackles in bases BLT  Cardiovascular: low HR with  regular rhythm, no murmur, gallop, rub  Abdomen: Soft, nontender, nondistended, normal bowel sounds, no hepatomegaly or splenomegaly  Neurologic: There are no new focal motor or sensory deficits, normal muscle tone and bulk, no abnormal sensation, normal speech, cranial nerves II through XII grossly intact  Skin: No gross lesions, rashes, bruising or bleeding on exposed skin area  Extremities: peripheral pulses palpable, BLT pedal edema.  No calf pain with palpation  Psych: normal affect    Investigations:      Laboratory Testing:  Recent Results (from the past 24 hour(s))   CBC with Auto Differential    Collection Time: 06/12/22  3:28 PM   Result Value Ref Range    WBC 6.1 3.5 - 11.0 k/uL    RBC 3.61 (L) 4.0 - 5.2 m/uL    Hemoglobin 10.9 (L) 12.0 - 16.0 g/dL    Hematocrit 34.7 (L) 36 - 46 %    MCV 96.0 80 - 100 fL    MCH 30.2 26 - 34 pg    MCHC 31.5 31 - 37 g/dL    RDW 16.6 (H) 11.5 - 14.9 %    Platelets 883 575 - 335 k/uL    MPV 8.5 6.0 - 12.0 fL    Seg Neutrophils 78 (H) 36 - 66 %    Lymphocytes 12 (L) 24 - 44 %    Monocytes 8 (H) 1 - 7 %    Eosinophils % 2 0 - 4 %    Basophils 0 0 - 2 %    Segs Absolute 4.80 1.3 - 9.1 k/uL    Absolute Lymph # 0.70 (L) 1.0 - 4.8 k/uL    Absolute Mono # 0.50 0.1 - 1.3 k/uL    Absolute Eos # 0.10 0.0 - 0.4 k/uL    Basophils Absolute 0.00 0.0 - 0.2 k/uL   Comprehensive Metabolic Panel w/ Reflex to MG    Collection Time: 06/12/22  3:28 PM   Result Value Ref Range    Glucose 203 (H) 70 - 99 mg/dL    BUN 74 (H) 8 - 23 mg/dL    CREATININE 2.30 (H) 0.50 - 0.90 mg/dL    Calcium 8.3 (L) 8.6 - 10.4 mg/dL Sodium 137 135 - 144 mmol/L    Potassium 5.3 3.7 - 5.3 mmol/L    Chloride 107 98 - 107 mmol/L    CO2 21 20 - 31 mmol/L    Anion Gap 9 9 - 17 mmol/L    Alkaline Phosphatase 125 (H) 35 - 104 U/L    ALT 22 5 - 33 U/L    AST 42 (H) <32 U/L    Total Bilirubin <0.15 (L) 0.3 - 1.2 mg/dL    Total Protein 4.7 (L) 6.4 - 8.3 g/dL    Albumin 2.0 (L) 3.5 - 5.2 g/dL    GFR Non- 21 (L) >60 mL/min    GFR  25 (L) >60 mL/min    GFR Comment         Troponin    Collection Time: 06/12/22  3:28 PM   Result Value Ref Range    Troponin, High Sensitivity 37 (H) 0 - 14 ng/L   Brain Natriuretic Peptide    Collection Time: 06/12/22  3:28 PM   Result Value Ref Range    Pro-BNP 10,293 (H) <300 pg/mL   Protime-INR    Collection Time: 06/12/22  3:28 PM   Result Value Ref Range    Protime 13.8 11.8 - 14.6 sec    INR 1.1    APTT    Collection Time: 06/12/22  3:28 PM   Result Value Ref Range    PTT 31.6 24.0 - 36.0 sec   TSH with Reflex    Collection Time: 06/12/22  3:28 PM   Result Value Ref Range    TSH 5.51 (H) 0.30 - 5.00 uIU/mL   T4, Free    Collection Time: 06/12/22  3:28 PM   Result Value Ref Range    Thyroxine, Free 0.98 0.93 - 1.70 ng/dL   EKG 12 Lead    Collection Time: 06/12/22  5:57 PM   Result Value Ref Range    Ventricular Rate 54 BPM    Atrial Rate 54 BPM    P-R Interval 194 ms    QRS Duration 108 ms    Q-T Interval 480 ms    QTc Calculation (Bazett) 455 ms    P Axis 73 degrees    R Axis 6 degrees    T Axis 11 degrees   Troponin    Collection Time: 06/12/22  6:08 PM   Result Value Ref Range    Troponin, High Sensitivity 40 (H) 0 - 14 ng/L   COVID-19, Rapid    Collection Time: 06/12/22  6:34 PM    Specimen: Nasopharyngeal Swab   Result Value Ref Range    Specimen Description . NASOPHARYNGEAL SWAB     SARS-CoV-2, Rapid Not Detected Not Detected   Urinalysis with Reflex to Culture    Collection Time: 06/12/22  9:33 PM    Specimen: Urine   Result Value Ref Range    Color, UA Yellow Yellow    Turbidity UA Cloudy (A) Clear    Glucose, Ur SMALL (A) NEGATIVE    Bilirubin Urine NEGATIVE NEGATIVE    Ketones, Urine NEGATIVE NEGATIVE    Specific Gravity, UA 1.020 1.000 - 1.030    Urine Hgb SMALL (A) NEGATIVE    pH, UA 5.0 5.0 - 8.0    Protein, UA 4+ (A) NEGATIVE    Urobilinogen, Urine Normal Normal    Nitrite, Urine NEGATIVE NEGATIVE    Leukocyte Esterase, Urine SMALL (A) NEGATIVE       Imaging/Diagnostics:  XR CHEST (2 VW)    Result Date: 6/7/2022  1. No definite acute pulmonary disease. 2.  Pulmonary sequela typical of that seen with smoking, including possible COPD; correlate with clinical history. 3. Calcific atherosclerosis aorta. 4. Cardiomegaly. XR CHEST PORTABLE    Result Date: 6/12/2022  Increasing small-moderate pleural effusions and bibasilar atelectasis. Assessment :      Hospital Problems           Last Modified POA    * (Principal) Bradycardia 6/13/2022 Yes      #Bradycardia  -HR improved. currently 61 and 68. Earlier it was 34   -Etiology likely medication induced as her medications recently increased her metoprolol and norvasc while being managed for CHF . Discharged 1 day prior to admission   -currently not on her med rec  -Underlying CAD with hx of CABG, CHF, DM  -EKG: Sinus  -Troponin 37-->40  -T4 wnml  -Echo from 3/14/22: 60-65 %., left atrium mildly dilated   -CXR: Increasing small-moderate pleural effusions and bibasilar atelectasis. -Cath report from 7/2020:   PLAN  -Unlikely to need intervention given etiology likely medication induced. Will need to be placed back on metoprolol at initial dose. Will defer dosing to Cardiology who is aware of patient. -Monitor on tele, prn atropine, pacer pads at bedside     #JOY on CKD3  -Cr on admission 2.3, BUN 74, bicarb 74, K is 5.3  -Baseline around 1.7  -Concern for mild CHF  -Admin diuretics , albumin 2.2   -concern for CHF development, poor fwd perfusion-->will admin bumex at increase dose.      #Chronic diastolic HF  -just DC from hospital 1 day prior  -ProBNP elevated over baseline with JOY on CKD  -BLT crackles. CXR with some vascular congestion, trace pitting edema  -Home bumex increased to BID--< monitor elevtrolytes, BP, renal function,     #A. Fib  -Diagnosed in 2020   -On apixaban 2.5 mg BID, Amiodarine 200mg  -Home metoprolol on hold.  Initially was on 50mg BID then recently increased on discharge ffor chf 1 day prior to hospitalizations   -Thyroid function wnml  -Echo from 3/14/22: 60-65 %., left atrium mildly dilated     #DM2  -Glucose on admission 200  -Glucose goal while inpatient 140-180  -Hold oral home meds  -Sliding scale, DM diet       VTE ppx: lovenox

## 2022-06-13 NOTE — ED NOTES
Call light activated. Patient requesting bed pan, provided. Hygiene performed. Patient repositioned in bed for comfort. Bed in lowest position and call light w/in reach. Denies add'l needs at this time.      Peri Chao RN  06/13/22 3842

## 2022-06-13 NOTE — ED NOTES
Continent of urine. Able to follow commands with bedpan use. Sample collected.      Tea Tobar RN  06/12/22 5870

## 2022-06-13 NOTE — ED NOTES
AC called for bed assignment. Patient going to room 402 at [facility]. N2N number [id number]. Transportation set up with [employer] with an ETA of 0030.       Ricarda Amos  06/12/22 6164

## 2022-06-13 NOTE — PROGRESS NOTES
PHARMACY NOTE:    The electrolyte replacement protocol for potassium/magnesium has been discontinued per P&T guidelines because the patient has reduced renal function (CrCl < 30 mL/min). The patient's most recent potassium & magnesium levels are:  Recent Labs     06/10/22  0622 06/11/22  0743 06/12/22  1528   K 4.4 4.7 5.3     Estimated Creatinine Clearance: 20 mL/min (A) (based on SCr of 2.3 mg/dL (H)). For patients with decreased renal function (below 30ml/min) needing potassium/magnesium supplementation, please order individual bolus doses with appropriate monitoring. Please contact the inpatient pharmacy with any concerns. Thank you.

## 2022-06-13 NOTE — PLAN OF CARE
Problem: Discharge Planning  Goal: Discharge to home or other facility with appropriate resources  6/13/2022 1236 by Maddy Diana RN  Outcome: Progressing  Flowsheets (Taken 6/13/2022 0140 by Prince Rucker RN)  Discharge to home or other facility with appropriate resources:   Identify barriers to discharge with patient and caregiver   Identify discharge learning needs (meds, wound care, etc)  6/13/2022 0134 by Prince Rucker RN  Outcome: Progressing     Problem: Safety - Adult  Goal: Free from fall injury  6/13/2022 1236 by Maddy Diana RN  Outcome: Progressing  6/13/2022 0134 by Prince Rucker RN  Outcome: Progressing     Problem: Chronic Conditions and Co-morbidities  Goal: Patient's chronic conditions and co-morbidity symptoms are monitored and maintained or improved  6/13/2022 1236 by Maddy Diana RN  Outcome: Progressing  6/13/2022 0134 by Prince Rucker RN  Outcome: Progressing     Problem: ABCDS Injury Assessment  Goal: Absence of physical injury  Outcome: Progressing     Problem: Skin/Tissue Integrity  Goal: Absence of new skin breakdown  Description: 1. Monitor for areas of redness and/or skin breakdown  2. Assess vascular access sites hourly  3. Every 4-6 hours minimum:  Change oxygen saturation probe site  4. Every 4-6 hours:  If on nasal continuous positive airway pressure, respiratory therapy assess nares and determine need for appliance change or resting period.   Outcome: Progressing

## 2022-06-13 NOTE — ED NOTES
Transport in department. Report provided, paperwork handed to staff. Patient belongings sent with daughter.       Zheng Marshall RN  06/13/22 6222

## 2022-06-14 PROBLEM — E66.9 OBESITY (BMI 30-39.9): Status: ACTIVE | Noted: 2022-06-14

## 2022-06-14 LAB
ANION GAP SERPL CALCULATED.3IONS-SCNC: 12 MMOL/L (ref 9–17)
ANION GAP SERPL CALCULATED.3IONS-SCNC: 13 MMOL/L (ref 9–17)
BUN BLDV-MCNC: 77 MG/DL (ref 8–23)
BUN BLDV-MCNC: 77 MG/DL (ref 8–23)
CALCIUM SERPL-MCNC: 8.2 MG/DL (ref 8.6–10.4)
CALCIUM SERPL-MCNC: 8.3 MG/DL (ref 8.6–10.4)
CARBOXYHEMOGLOBIN: 1.7 % (ref 0–5)
CHLORIDE BLD-SCNC: 104 MMOL/L (ref 98–107)
CHLORIDE BLD-SCNC: 106 MMOL/L (ref 98–107)
CO2: 19 MMOL/L (ref 20–31)
CO2: 20 MMOL/L (ref 20–31)
CREAT SERPL-MCNC: 2.43 MG/DL (ref 0.5–0.9)
CREAT SERPL-MCNC: 2.45 MG/DL (ref 0.5–0.9)
CULTURE: NORMAL
CULTURE: NORMAL
FIO2: ABNORMAL
GFR AFRICAN AMERICAN: 23 ML/MIN
GFR AFRICAN AMERICAN: 24 ML/MIN
GFR NON-AFRICAN AMERICAN: 19 ML/MIN
GFR NON-AFRICAN AMERICAN: 20 ML/MIN
GFR SERPL CREATININE-BSD FRML MDRD: ABNORMAL ML/MIN/{1.73_M2}
GFR SERPL CREATININE-BSD FRML MDRD: ABNORMAL ML/MIN/{1.73_M2}
GLUCOSE BLD-MCNC: 116 MG/DL (ref 65–105)
GLUCOSE BLD-MCNC: 129 MG/DL (ref 70–99)
GLUCOSE BLD-MCNC: 88 MG/DL (ref 65–105)
GLUCOSE BLD-MCNC: 96 MG/DL (ref 65–105)
GLUCOSE BLD-MCNC: 99 MG/DL (ref 70–99)
HCO3 VENOUS: 22.9 MMOL/L (ref 24–30)
HCT VFR BLD CALC: 36.4 % (ref 36.3–47.1)
HEMOGLOBIN: 11.2 G/DL (ref 11.9–15.1)
INR BLD: 1
MCH RBC QN AUTO: 30.7 PG (ref 25.2–33.5)
MCHC RBC AUTO-ENTMCNC: 30.8 G/DL (ref 28.4–34.8)
MCV RBC AUTO: 99.7 FL (ref 82.6–102.9)
NEGATIVE BASE EXCESS, VEN: 2.8 MMOL/L (ref 0–2)
NRBC AUTOMATED: 0 PER 100 WBC
O2 SAT, VEN: 93.1 % (ref 60–85)
PATIENT TEMP: 37
PCO2, VEN: 46.7 (ref 39–55)
PDW BLD-RTO: 15.8 % (ref 11.8–14.4)
PH VENOUS: 7.31 (ref 7.32–7.42)
PLATELET # BLD: 188 K/UL (ref 138–453)
PMV BLD AUTO: 10.4 FL (ref 8.1–13.5)
PO2, VEN: 63.1 (ref 30–50)
POTASSIUM SERPL-SCNC: 4.9 MMOL/L (ref 3.7–5.3)
POTASSIUM SERPL-SCNC: 5.1 MMOL/L (ref 3.7–5.3)
PROTHROMBIN TIME: 10.8 SEC (ref 9.1–12.3)
RBC # BLD: 3.65 M/UL (ref 3.95–5.11)
SODIUM BLD-SCNC: 135 MMOL/L (ref 135–144)
SODIUM BLD-SCNC: 139 MMOL/L (ref 135–144)
SPECIMEN DESCRIPTION: NORMAL
SPECIMEN DESCRIPTION: NORMAL
WBC # BLD: 9.7 K/UL (ref 3.5–11.3)

## 2022-06-14 PROCEDURE — 93243 EXT ECG>48HR<7D SCAN A/R: CPT

## 2022-06-14 PROCEDURE — 6370000000 HC RX 637 (ALT 250 FOR IP): Performed by: INTERNAL MEDICINE

## 2022-06-14 PROCEDURE — 85610 PROTHROMBIN TIME: CPT

## 2022-06-14 PROCEDURE — 99232 SBSQ HOSP IP/OBS MODERATE 35: CPT | Performed by: INTERNAL MEDICINE

## 2022-06-14 PROCEDURE — 82947 ASSAY GLUCOSE BLOOD QUANT: CPT

## 2022-06-14 PROCEDURE — 2580000003 HC RX 258: Performed by: INTERNAL MEDICINE

## 2022-06-14 PROCEDURE — 36415 COLL VENOUS BLD VENIPUNCTURE: CPT

## 2022-06-14 PROCEDURE — 93242 EXT ECG>48HR<7D RECORDING: CPT

## 2022-06-14 PROCEDURE — 6370000000 HC RX 637 (ALT 250 FOR IP): Performed by: NURSE PRACTITIONER

## 2022-06-14 PROCEDURE — 85027 COMPLETE CBC AUTOMATED: CPT

## 2022-06-14 PROCEDURE — 2580000003 HC RX 258: Performed by: NURSE PRACTITIONER

## 2022-06-14 PROCEDURE — 99214 OFFICE O/P EST MOD 30 MIN: CPT

## 2022-06-14 PROCEDURE — 2060000000 HC ICU INTERMEDIATE R&B

## 2022-06-14 PROCEDURE — 80048 BASIC METABOLIC PNL TOTAL CA: CPT

## 2022-06-14 PROCEDURE — 82805 BLOOD GASES W/O2 SATURATION: CPT

## 2022-06-14 RX ORDER — SODIUM CHLORIDE 450 MG/100ML
INJECTION, SOLUTION INTRAVENOUS CONTINUOUS
Status: DISCONTINUED | OUTPATIENT
Start: 2022-06-14 | End: 2022-06-15 | Stop reason: HOSPADM

## 2022-06-14 RX ADMIN — DESMOPRESSIN ACETATE 40 MG: 0.2 TABLET ORAL at 21:11

## 2022-06-14 RX ADMIN — CETIRIZINE HYDROCHLORIDE 5 MG: 10 TABLET ORAL at 08:38

## 2022-06-14 RX ADMIN — HYDRALAZINE HYDROCHLORIDE 75 MG: 50 TABLET, FILM COATED ORAL at 22:37

## 2022-06-14 RX ADMIN — ACETAMINOPHEN 650 MG: 325 TABLET ORAL at 15:38

## 2022-06-14 RX ADMIN — ACETAMINOPHEN 650 MG: 325 TABLET ORAL at 02:29

## 2022-06-14 RX ADMIN — SODIUM CHLORIDE, PRESERVATIVE FREE 10 ML: 5 INJECTION INTRAVENOUS at 08:40

## 2022-06-14 RX ADMIN — Medication 1 TABLET: at 08:38

## 2022-06-14 RX ADMIN — ACETAMINOPHEN 650 MG: 325 TABLET ORAL at 08:49

## 2022-06-14 RX ADMIN — METOPROLOL TARTRATE 12.5 MG: 25 TABLET ORAL at 15:41

## 2022-06-14 RX ADMIN — BUMETANIDE 1 MG: 1 TABLET ORAL at 08:38

## 2022-06-14 RX ADMIN — Medication 81 MG: at 08:38

## 2022-06-14 RX ADMIN — ANTI-FUNGAL POWDER MICONAZOLE NITRATE TALC FREE: 1.42 POWDER TOPICAL at 09:00

## 2022-06-14 RX ADMIN — HYDRALAZINE HYDROCHLORIDE 75 MG: 50 TABLET, FILM COATED ORAL at 15:41

## 2022-06-14 RX ADMIN — ANTI-FUNGAL POWDER MICONAZOLE NITRATE TALC FREE: 1.42 POWDER TOPICAL at 22:56

## 2022-06-14 RX ADMIN — SODIUM CHLORIDE, PRESERVATIVE FREE 10 ML: 5 INJECTION INTRAVENOUS at 21:12

## 2022-06-14 RX ADMIN — APIXABAN 2.5 MG: 2.5 TABLET, FILM COATED ORAL at 08:38

## 2022-06-14 RX ADMIN — FERROUS SULFATE TAB EC 325 MG (65 MG FE EQUIVALENT) 325 MG: 325 (65 FE) TABLET DELAYED RESPONSE at 08:40

## 2022-06-14 RX ADMIN — SODIUM CHLORIDE: 4.5 INJECTION, SOLUTION INTRAVENOUS at 15:47

## 2022-06-14 RX ADMIN — METOPROLOL TARTRATE 12.5 MG: 25 TABLET ORAL at 21:11

## 2022-06-14 RX ADMIN — HYDRALAZINE HYDROCHLORIDE 75 MG: 50 TABLET, FILM COATED ORAL at 08:38

## 2022-06-14 RX ADMIN — APIXABAN 2.5 MG: 2.5 TABLET, FILM COATED ORAL at 21:11

## 2022-06-14 RX ADMIN — Medication 1 EACH: at 14:58

## 2022-06-14 ASSESSMENT — PAIN SCALES - GENERAL
PAINLEVEL_OUTOF10: 6
PAINLEVEL_OUTOF10: 6
PAINLEVEL_OUTOF10: 3
PAINLEVEL_OUTOF10: 2
PAINLEVEL_OUTOF10: 4

## 2022-06-14 ASSESSMENT — PAIN DESCRIPTION - DESCRIPTORS
DESCRIPTORS: ACHING;DULL
DESCRIPTORS: ACHING
DESCRIPTORS: ACHING

## 2022-06-14 ASSESSMENT — PAIN DESCRIPTION - LOCATION
LOCATION: HEAD
LOCATION: BACK
LOCATION: HEAD
LOCATION: HEAD

## 2022-06-14 ASSESSMENT — ENCOUNTER SYMPTOMS
NAUSEA: 0
SHORTNESS OF BREATH: 0
COUGH: 0
ABDOMINAL PAIN: 0
VOMITING: 0

## 2022-06-14 ASSESSMENT — PAIN DESCRIPTION - ORIENTATION
ORIENTATION: MID;LOWER
ORIENTATION: RIGHT;LEFT;LOWER

## 2022-06-14 NOTE — ACP (ADVANCE CARE PLANNING)
..Advance Care Planning     Advance Care Planning Activator (Inpatient)  Conversation Note      Date of ACP Conversation: 6/14/2022     Conversation Conducted with: Patient with Decision Making Capacity    ACP Activator: SCOTT FERNANDES [facility], RN      Current Designated Health Care Decision Maker:     Primary Decision Maker: Nikko Pugh - Child - [phone]    Primary Decision Maker: CYRIL NELSON - Child - [phone]    Primary Decision Maker: Gloria Maurer - Child - [phone]    Primary Decision Maker: Anatoliy Barron - [phone]    Care Preferences    Ventilation: \"If you were in your present state of health and suddenly became very ill and were unable to breathe on your own, what would your preference be about the use of a ventilator (breathing machine) if it were available to you? \"      Would the patient desire the use of ventilator (breathing machine)?: yes    \"If your health worsens and it becomes clear that your chance of recovery is unlikely, what would your preference be about the use of a ventilator (breathing machine) if it were available to you? \"     Would the patient desire the use of ventilator (breathing machine)?: Yes      Resuscitation  \"CPR works best to restart the heart when there is a sudden event, like a heart attack, in someone who is otherwise healthy. Unfortunately, CPR does not typically restart the heart for people who have serious health conditions or who are very sick. \"    \"In the event your heart stopped as a result of an underlying serious health condition, would you want attempts to be made to restart your heart (answer \"yes\" for attempt to resuscitate) or would you prefer a natural death (answer \"no\" for do not attempt to resuscitate)? \" yes       [] Yes   [] No   Educated Patient / Melina Trotter regarding differences between Advance Directives and portable DNR orders.     Length of ACP Conversation in minutes:      Conversation Outcomes:  [x] ACP discussion completed  [] Existing advance directive reviewed with patient; no changes to patient's previously recorded wishes  [] New Advance Directive completed  [] Portable Do Not Rescitate prepared for Provider review and signature  [] POLST/POST/MOLST/MOST prepared for Provider review and signature      Follow-up plan:    [x] Schedule follow-up conversation to continue planning  [] Referred individual to Provider for additional questions/concerns   [] Advised patient/agent/surrogate to review completed ACP document and update if needed with changes in condition, patient preferences or care setting    [] This note routed to one or more involved healthcare providers

## 2022-06-14 NOTE — PROGRESS NOTES
[facility] Contin[facility] Nurse  Consult Note       NAME:  Justyn Myles  MEDICAL RECORD NUMBER:  [id number]  AGE: 66 y.o. GENDER: female  : 1950  TODAY'S DATE:  2022    Subjective:     Reason for WOCN Evaluation and Assessment: \"leg wounds\"      Justyn Myles is a 66 y.o. female referred by:   [x] Physician  [] Nursing  [] Other:     Wound Identification:  History of LEVD with ulcerations. Treated with silver hydrofiber to each wound and calamine Unna's. Weekly Unna's changes most recently completed at [facility] by the [facility] on 2022. The wounds are now healed. Patient's daughter requests and patient agrees to routine change today as discharge is planned today vs tomorrow. Scheduled [facility] follow up scheduled for tomorrow. Patient has a planned transition to Juxtalite compression garments in near future. Objective:      BP (!) 142/60   Pulse 76   Temp 97.7 °F (36.5 °C) (Oral)   Resp 17   Ht 4' 11\" (1.499 m)   Wt 152 lb 1.9 oz (69 kg)   SpO2 93%   BMI 30.72 kg/m²   Jackson Risk Score: Jackson Scale Score: 18    LABS    CBC:   Lab Results   Component Value Date    WBC 6.1 2022    RBC 3.61 2022    HGB 10.9 2022     CMP:  Albumin:    Lab Results   Component Value Date    LABALBU 2.0 2022     PT/INR:    Lab Results   Component Value Date    PROTIME 10.8 2022    INR 1.0 2022     HgBA1c:    Lab Results   Component Value Date    LABA1C 4.6 2022     PTT: No components found for: LABPTT      Assessment:       Measurements:     22 1459   Wound 22 Left;Proximal Wound # 4 Left lower lat leg   Date First Assessed: 22   Present on Hospital Admission: Yes  Wound Approximate Age at First Assessment (Weeks): 0.5 weeks  Primary Wound Type: Venous Ulcer  Wound Location Orientation: Left;Proximal  Wound Description (Comments): Wound # 4 Left. ..    Wound Image     Dressing Status New dressing applied   Wound Cleansed Soap and water [x] Verbal Understanding  [] Demonstrated understanding       [] No evidence of learning  [] Refused teaching         [] N/A       Electronically signed by Brian Sebastian RN, [facility] on 6/14/2022 at 3:16 PM

## 2022-06-14 NOTE — PLAN OF CARE
Problem: Discharge Planning  Goal: Discharge to home or other facility with appropriate resources  Outcome: Progressing     Problem: Safety - Adult  Goal: Free from fall injury  Outcome: Progressing     Problem: Chronic Conditions and Co-morbidities  Goal: Patient's chronic conditions and co-morbidity symptoms are monitored and maintained or improved  Outcome: Progressing     Problem: ABCDS Injury Assessment  Goal: Absence of physical injury  Outcome: Progressing     Problem: Skin/Tissue Integrity  Goal: Absence of new skin breakdown  Description: 1. Monitor for areas of redness and/or skin breakdown  2. Assess vascular access sites hourly  3. Every 4-6 hours minimum:  Change oxygen saturation probe site  4. Every 4-6 hours:  If on nasal continuous positive airway pressure, respiratory therapy assess nares and determine need for appliance change or resting period.   Outcome: Progressing     Problem: Pain  Goal: Verbalizes/displays adequate comfort level or baseline comfort level  Outcome: Progressing

## 2022-06-14 NOTE — CONSULTS
..    Palliative Care Inpatient Consult    NAME:  Justice Hull  MEDICAL RECORD NUMBER:  [id number]  AGE: 66 y.o. GENDER: female  : 1950  TODAY'S DATE:  2022    Reasons for Consultation:    Provision of information regarding PC and/or hospice philosophies  Complex, time-intensive communication and interdisciplinary psychosocial support  Clarification of goals of care and/or assistance with difficult decision-making  Guidance in regards to resources and transition(s)    Code Status: Full Code    PLAN:  -Pt does not want to fill out DPOA paperwork right now as she wants to think about it.   -Discussed code status and patient wants to think about it. Gave family written information regarding different code status classifications.  -Will continue to follow    Members of PC team contributing to this consultation are :  Toñito Quintero RN, [facility]    History of Present Illness     The patient is a 66 y.o. Non- / non  female who presents with No chief complaint on file. Referred to Palliative Care by   [x] Physician   [] Nursing  [] Family Request   [] Other:       She was admitted to the primary service for Bradycardia [R00.1]. Her hospital course has been associated with Bradycardia. The patient has a complicated medical history and has been hospitalized since 2022  1:27 AM.    Active Hospital Problems    Diagnosis Date Noted    Obesity (BMI 30-39. 9) [E66.9] 2022     Priority: Medium    Bradycardia [R00.1] 2022     Priority: Medium    Acute kidney injury superimposed on chronic kidney disease ([address] [N17.9, N18.9] 2022     Priority: Medium    Anticoagulated [Z79.01] 2022     Priority: Medium    Paroxysmal atrial fibrillation ([address] [I48.0] 2020    Chronic diastolic CHF (congestive heart failure) ([facility] [I50.32] 2020    COPD (chronic obstructive pulmonary disease) ([address] [J44.9] 2020    Coronary artery disease involving native coronary artery of native heart without angina pectoris [I25.10]     Stage 3 chronic kidney disease ([facility] [N18.30]     Type 2 diabetes mellitus with stage 3 chronic kidney disease, without long-term current use of insulin ([facility] [E11.22, N18.30]        Data        Code Status: Full Code     ADVANCED CARE PLANNING:  Patient has capacity for medical decisions: yes  Health Care Power of : no  Living Will: no     Personal, Social, and Family History  Marital Status:   Living situation:with family:  son  Vijaya/Spiritual History:   Not asked  Psychological Distress: none noted  Does patient understand diagnosis/treatment? yes  Does family/caregiver understand diagnosis/treatment?  yes    Assessment        Palliative Performance Scale:    ___100% Full ambulation; normal activity and work; no evidence of disease; able to do own self care; normal intake; fully conscious  ___90% Full ambulation; normal activity and work; some evidence of disease; able to do own self care; normal intake; fully conscious  ___80% Full ambulation; normal activity with effort; some evidence of disease; able to do own self care; normal or reduced intake; fully conscious  ___70% Ambulation reduced; unable to perform normal job/work; significant disease; able to do own self care; normal or reduced intake; fully conscious  ___60%  Ambulation reduced; cannot do hobbies/housework; significant disease; occasional assist; intake normal or reduced; fully conscious/some confusion  ___50%  Mainly sit/lie; can't do any work; extensive disease; considerable assist; intake normal or reduced; fully conscious/some confusion  ___40%  Mainly in bed; extensive disease; mainly assist; intake normal or reduced; fully conscious/ some confusion   _x__30%  Bed bound; extensive disease; total care; intake reduced; fully conscious/some confusion  ___20%  Bed bound; extensive disease; total care; intake minimal; drowsy/coma  ___10%  Bed bound; extensive disease; total care; mouth care only; drowsy/coma  ___0       Death       Risk Assessments:    Sylvia Risk Score: [unfilled]    Readmission Risk Score: 21.8 ( )        1 Year Mortality Risk Score: @1YEARMORTALITYRISK@     Plan        Palliative Interaction: Update rec'd from RN. Pt resting in bed, she is alert and oriented but sleepy. She can hold a conversation with me. She states she is going home where she lives with her son who takes care of her. Her daughter Karena Donaldson also comes to see her daily. Pt states she does not walk in the house by herself. She states her son walks her to the bathroom and back. Pt appears weak and frail. Pt's daughter Karena Donaldson is at beside. I introduce myself to her and my role. I talked with patient about her wishes. I asked her who she would want to make medical decisions for her if she could not. She states that she has talked with the  about this before and she received the paperwork (outpatient) but she told the  she wanted to think about it. She states she thinks she would want Orah to be her DPOA and her sister to be alternate. I explained that we can fill the paperwork out with her now as it is crucial if she does not want all of her children to make decisions. I explained [place] to patient and her daughter regarding Decision Making. Daughter encourages her mom to do the paperwork but patient states, \"I want to think about it some more\". Pt does not want to do paperwork today. I also talked to patient about her illness and her medical wishes. Pt states she would want CPR. She states, \"I don't want to be a vegetable and live on machines\". I explained the code classifications to her but she was becoming tired and falling asleep during my conversation. I talked with Karena Donaldson for a while about patient's frail condition and the effects CPR would have on her. Karena Donaldson states this is what happened with her father and he \"bled out\" while he was getting CPR.  Karena Donaldson states she would not want that to happen to her mom. I gave her some written literature on the 3 code classifications. I encouraged her to talk with her mom about her wishes and discuss the different levels. I reminded Kaushik Shannon that she would be patient's voice if patient could not speak so it is imperative to understand her wishes. She said she will talk with patient about it. Kaushik Shannon is appreciative of the written education. She states her brother does \"not like to talk about this stuff\" and her sister is a PA student but \"lives far away\", so that is why she would be the [address] [place] [address]. I told Kaushik Shannon if patient changes her mind and wants to fill out a DPOA while she is here to call me. I gave her my contact information. Offered emotional support to Kaushik Shannon. Education/support to family  Education/support to patient  Discharge planning/helping to coordinate care  Communications with primary service  Providing support for coping/adaptation/distress of family  Providing support for coping/adaptation/distress of patient  Discussing meaning/purpose   Continue with current plan of care  Clarification of medical condition to patient and family  Code status clarified: Full Code  Validating patient/family distress  Continued communication updates  Educated patient and daughter on [address] [place] [address] and [place] regarding decision making. Discussed code status with patient and she wants to think about it. Principle Problem/Diagnosis:  Bradycardia [R00.1]    Goals of care evaluation:  The patient goals of care are improve or maintain function/quality of life, support for family/caregiver and education on AD and Code status   Goals of care discussed with:    [] Patient independently    [x] Patient and Family    [] Family or Healthcare DPOA independently    [] Unable to discuss with patient, family/DPOA not present    Code Status  Full Code    Other recommendations:  Please call with any palliative questions or concerns.   Palliative Care Team is available via perfect serve or via

## 2022-06-14 NOTE — PROGRESS NOTES
[facility]   Progress Note                   Date:   6/14/2022  Patient name: Steve Lees  Date of admission:  6/13/2022  1:27 AM  MRN:   [id number]  YOB: 1950  PCP: Zelda Purcell MD    Reason for Admission: Bradycardia [R00.1]    Subjective:       Clinical Changes / Abnormalities: Pt seen and examined in the room. Daughter at bedside. HR stable in the 70's       Medications:   Scheduled Meds:   apixaban  2.5 mg Oral BID    atorvastatin  40 mg Oral Nightly    cetirizine  5 mg Oral Daily    ferrous sulfate  325 mg Oral Daily with breakfast    hydrALAZINE  75 mg Oral TID    [Held by provider] magnesium oxide  400 mg Oral Daily    miconazole   Topical BID    therapeutic multivitamin-minerals  1 tablet Oral Daily    [START ON 6/19/2022] vitamin D  50,000 Units Oral Weekly    insulin lispro  0-12 Units SubCUTAneous TID WC    insulin lispro  0-6 Units SubCUTAneous Nightly    sodium chloride flush  5-40 mL IntraVENous 2 times per day    bumetanide  1 mg Oral BID    aspirin  81 mg Oral Daily     Continuous Infusions:   dextrose      sodium chloride       CBC:   Recent Labs     06/12/22  1528   WBC 6.1   HGB 10.9*        BMP:    Recent Labs     06/12/22  1528 06/14/22  0256    139   K 5.3 5.1    106   CO2 21 20   BUN 74* 77*   CREATININE 2.30* 2.45*   GLUCOSE 203* 99     Hepatic:   Recent Labs     06/12/22  1528   AST 42*   ALT 22   BILITOT <0.15*   ALKPHOS 125*     Troponin:   Recent Labs     06/12/22  1528 06/12/22  1808   TROPHS 37* 40*     BNP: No results for input(s): BNP in the last 72 hours. Lipids: No results for input(s): CHOL, HDL in the last 72 hours.     Invalid input(s): LDLCALCU  INR:   Recent Labs     06/12/22  1528 06/14/22  0256   INR 1.1 1.0       Objective:   Vitals: BP (!) 140/60   Pulse 73   Temp 98 °F (36.7 °C) (Oral)   Resp 16   Ht 4' 11\" (1.499 m)   Wt 152 lb 1.9 oz (69 kg)   SpO2 95%   BMI 30.72 kg/m²   General appearance: alert and cooperative with exam  HEENT: Head: Normocephalic, no lesions, without obvious abnormality. Neck: no JVD, trachea midline, no adenopathy  Lungs: Clear to auscultation  Heart: Regular rate and rhythm, s1/s2 auscultated, no murmurs  Abdomen: soft, non-tender, bowel sounds active  Extremities: no edema  Neurologic: not done        Assessment / Acute Cardiac Problems:   1. Sinus bradycardia likely due to amiodarone and metoprolol. 2. CABG with LIMA-LAD, SVG-OM and SVG-RCA in 2003. Occluded SVG-OM2 by heart catheterization in 2006. 3. LV dysfunction, resolved, echo march 2022 EF 60-65%    4. Hyperlipidemia. 5. Diabetes. function 64% and no ischemia. 6. 7/28/20 [address] w/ Celllulitis/sepsis. Afib RVR. S/p cardioversion 7/2020    Patient Active Problem List:     Vitamin D deficiency     Venous insufficiency of both lower extremities     Asthma     Type 2 diabetes mellitus with stage 3 chronic kidney disease, without long-term current use of insulin (HCC)     Coronary artery disease involving native coronary artery of native heart without angina pectoris     Iron deficiency anemia     Stage 3 chronic kidney disease ([address]     Colonoscopy refused     Pneumococcal vaccination declined     Partial deafness of both ears     Chronic diastolic CHF (congestive heart failure) (HCC)     COPD (chronic obstructive pulmonary disease) ([address]     HTN.  Benign hypertension with CKD (chronic kidney disease) stage III (HCC)     Gout with tophi     Hyperlipidemia with target LDL less than 70     Dry skin dermatitis HANDS     Hypertensive emergency     Meningioma, cerebral (HCC)     Paroxysmal atrial fibrillation (HCC)     Influenza vaccination declined     Recurrent UTI     Elevated LFTs     Cellulitis of both lower extremities     Hypomagnesemia     Kidney stones     Diverticulosis     COVID-19 vaccination declined     JOY (acute kidney injury) ([address]     Acute on chronic combined systolic (congestive) and diastolic (congestive) heart failure (HCC)     Chronic a-fib (HCC)     Chronic venous stasis dermatitis of both lower extremities     Symptomatic anemia     Family history of colon cancer     Family history of pancreatic cancer     Mild malnutrition (HCC)     Decreased strength, endurance, and mobility     Bilateral lower extremity edema     Non-pressure chronic ulcer of other part of left lower leg limited to breakdown of skin (HCC)     Bleeding nose     Unintended weight loss     Lymphedema     Peripheral vascular disease, unspecified (HCC)     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 ([facility]     Acute kidney injury superimposed on chronic kidney disease ([facility]     Anticoagulated     Hypertensive urgency with nyla     Bradycardia      Plan of Treatment:   1. Bradycardia. NSR, HR stable now, 70's. Will start low dose BB today. 2. Continue eliquis, bumex, asa and statin   3. Holter monitor on discharge. 4. [id number] for discharge from cardio standpoint.       Electronically signed by MAL Bang CNP on 6/14/2022 at 12:22 PM  [address]  [phone]

## 2022-06-14 NOTE — PROGRESS NOTES
[facility]  Office: [address], DO, Ced China, DO, Dilip Lai, DO, Dylon Pugh Blood, DO, Robbi Mendoza MD, Janette Wise MD, Seferino Jones MD, Anna Marie Kerr MD, Michael Wei MD, Yoli Lizarraga MD, Suki Lopez MD, Que Brooks, DO, Addy Barr MD,  Latonya Grimaldo, DO, Gabriela Llamas MD, Susi Mann MD, Tono Cordoba MD, Kylee Wang DO, Reese Mcqueen MD, Chiquis Logan MD, Cesar Casas, DO, Rita Hopkins MD, Cheli Hale [facility], [facility], CNP, Rody Marie, CNP, Dillon Dunn, CNP, Saray Horowitz, CNP, Saundra Cary, CNP, Dayna Sánchez PA-C, Charmel Osgood, [facility], Iva Cohn, [facility], Alpa Grimaldo, CNP, Jayson Ro, CNP, Kaci Romeo, CNS, Iman Levine, [facility], Paul Mendez, CNP, Ramesh Matthews, CNP, Shadi Gamez, CNP         [address]    Progress Note    6/14/2022    2:00 PM    Name:   Taniya Whitlock  MRN:     [id number]     Acct:      [de-identified]   Room:   [address] [place] [address] Day:  1  Admit Date:  6/13/2022  1:27 AM    PCP:   Dior Huertas MD  Code Status:  Full Code    Subjective:     C/C:   Fatigue  Bradycardia    Interval History Status:   Improved  Feels stronger  Heart rates have been in the 60s and 70s    Data Base Updates:  Telemetry now reveals sinus rhythm at 82 bpm    Nnlmoxc05ks/dL     BUN77 High mg/dL   CREATININE2.45 High mg/dL     Calcium8. 3 Low        Brief History:     As documented in the medical record:  \"70 year old female with diastolic CHF, a fib on AC, HTN, PVD, DM and CKD who was recently discharged from hospital for a CHF exacerbation. At that time her metoprolol and norvasc was increased prior to discharged. Patient went home and reported vague symptoms such as fatigue. Work up revealed a low HR. Patient was taken to Local ER where she was found to have sinus bradycardia.  Transferred to a facility with cardio services for concern for possible pacer placement however etiology appears secondary to medication and no signs of ischemia. Patients HR has been trending back up. Her metoprolol and amlodipine are still on hold. Patient denies any Chest pain, nausea, vomit, diaphoresis, cough, fever. She reports still some dyspnea and edema from her recent hospitalization. Admitted for further work up and evaluation \"    The intitial assessment and plan included:  [facility] Problems            Last Modified POA     * (Principal) Bradycardia 6/13/2022 Yes       #Bradycardia  -HR improved. currently 61 and 68. Earlier it was 34   -Etiology likely medication induced as her medications recently increased her metoprolol and norvasc while being managed for CHF . Discharged 1 day prior to admission   -currently not on her med rec  -Underlying CAD with hx of CABG, CHF, DM  -EKG: Sinus  -Troponin 37-->40  -T4 wnml  -Echo from 3/14/22: 60-65 %., left atrium mildly dilated   -CXR: Increasing small-moderate pleural effusions and bibasilar atelectasis. -Cath report from 7/2020:   PLAN  -Unlikely to need intervention given etiology likely medication induced. Will need to be placed back on metoprolol at initial dose. Will defer dosing to Cardiology who is aware of patient. -Monitor on tele, prn atropine, pacer pads at bedside      #JOY on CKD3  -Cr on admission 2.3, BUN 74, bicarb 74, K is 5.3  -Baseline around 1.7  -Concern for mild CHF  -Admin diuretics , albumin 2.2   -concern for CHF development, poor fwd perfusion-->will admin bumex at increase dose. #Chronic diastolic HF  -just DC from hospital 1 day prior  -ProBNP elevated over baseline with JOY on CKD  -BLT crackles. CXR with some vascular congestion, trace pitting edema  -Home bumex increased to BID--< monitor elevtrolytes, BP, renal function,      #A. Fib  -Diagnosed in 2020   -On apixaban 2.5 mg BID, Amiodarine 200mg  -Home metoprolol on hold.  Initially was on 50mg BID then recently increased on discharge ffor chf 1 day prior to Age of Onset    Diabetes Mother     Heart Disease Mother     Heart Disease Father     Diabetes Sister     High Blood Pressure Sister     Diabetes Maternal Grandmother        Review of Systems:     Review of Systems   Constitutional: Positive for activity change (Improved) and fatigue (Feels stronger). Respiratory: Negative for cough and shortness of breath. Cardiovascular: Positive for leg swelling (Chronic but improving). Negative for chest pain and palpitations. Gastrointestinal: Negative for abdominal pain, nausea and vomiting. Genitourinary: Negative for flank pain and hematuria. Skin: Positive for wound (Chronic lower extremity wounds). Neurological: Negative for dizziness. Physical Examination:        Vitals  BP (!) 142/60   Pulse 76   Temp 97.7 °F (36.5 °C) (Oral)   Resp 17   Ht 4' 11\" (1.499 m)   Wt 152 lb 1.9 oz (69 kg)   SpO2 93%   BMI 30.72 kg/m²   Temp (24hrs), Av.8 °F (36.6 °C), Min:97.4 °F (36.3 °C), Max:98.6 °F (37 °C)    Recent Labs     22  1619 22  1953 22  0835 22  1210   POCGLU 97 110* 88 96       Physical Exam  Vitals reviewed. Constitutional:       General: She is not in acute distress. Appearance: She is not diaphoretic. HENT:      Head: Normocephalic. Nose: Nose normal.   Eyes:      General: No scleral icterus. Conjunctiva/sclera: Conjunctivae normal.   Neck:      Trachea: No tracheal deviation. Cardiovascular:      Rate and Rhythm: Normal rate and regular rhythm. Pulmonary:      Effort: Pulmonary effort is normal. No respiratory distress. Breath sounds: Normal breath sounds. No wheezing or rales. Chest:      Chest wall: No tenderness. Abdominal:      General: There is no distension. Palpations: Abdomen is soft. Tenderness: There is no abdominal tenderness. Musculoskeletal:         General: No tenderness. Cervical back: Neck supple. Right lower leg: Edema present.       Left lower leg: Edema present. Comments: 1/4 edema at the ankles   Skin:     General: Skin is warm and dry. Comments: Chronic lower extremity wounds: See pictures   Neurological:      Mental Status: She is alert and oriented to person, place, and time. Medications: Allergies: Allergies   Allergen Reactions    Latex Rash    Aleve [Naproxen Sodium]      Chronic kidney disease stage III, CHF    Pioglitazone Other (See Comments)     Congestive heart failure    Claritin [Loratadine]     Keflex [Cephalexin]     Lisinopril      Needs clarification of contraindication       Current Meds:   Scheduled Meds:    metoprolol tartrate  12.5 mg Oral BID    apixaban  2.5 mg Oral BID    atorvastatin  40 mg Oral Nightly    cetirizine  5 mg Oral Daily    ferrous sulfate  325 mg Oral Daily with breakfast    hydrALAZINE  75 mg Oral TID    [Held by provider] magnesium oxide  400 mg Oral Daily    miconazole   Topical BID    therapeutic multivitamin-minerals  1 tablet Oral Daily    [START ON 6/19/2022] vitamin D  50,000 Units Oral Weekly    insulin lispro  0-12 Units SubCUTAneous TID WC    insulin lispro  0-6 Units SubCUTAneous Nightly    sodium chloride flush  5-40 mL IntraVENous 2 times per day    [Held by provider] bumetanide  1 mg Oral BID    aspirin  81 mg Oral Daily     Continuous Infusions:    sodium chloride      dextrose      sodium chloride       PRN Meds: albuterol, glucose, dextrose bolus **OR** dextrose bolus, glucagon (rDNA), dextrose, sodium chloride flush, sodium chloride, ondansetron **OR** ondansetron, polyethylene glycol, acetaminophen **OR** acetaminophen    Data:     I/O (24Hr):     Intake/Output Summary (Last 24 hours) at 6/14/2022 1400  Last data filed at 6/14/2022 1330  Gross per 24 hour   Intake 460 ml   Output 1025 ml   Net -565 ml       Labs:  Hematology:  Recent Labs     06/12/22  1528 06/14/22  0256   WBC 6.1  --    RBC 3.61*  --    HGB 10.9*  --    HCT 34.7*  -- MCV 96.0  --    MCH 30.2  --    MCHC 31.5  --    RDW 16.6*  --      --    MPV 8.5  --    INR 1.1 1.0     Chemistry:  Recent Labs     06/12/22  1528 06/12/22  1808 06/14/22  0256     --  139   K 5.3  --  5.1     --  106   CO2 21  --  20   GLUCOSE 203*  --  99   BUN 74*  --  77*   CREATININE 2.30*  --  2.45*   ANIONGAP 9  --  13   LABGLOM 21*  --  19*   GFRAA 25*  --  23*   CALCIUM 8.3*  --  8.3*   PROBNP 10,293*  --   --    TROPHS 37* 40*  --      Recent Labs     06/12/22  1528 06/13/22  0807 06/13/22  1146 06/13/22  1619 06/13/22  1953 06/14/22  0835 06/14/22  1210   PROT 4.7*  --   --   --   --   --   --    LABALBU 2.0*  --   --   --   --   --   --    TSH 5.51*  --   --   --   --   --   --    AST 42*  --   --   --   --   --   --    ALT 22  --   --   --   --   --   --    ALKPHOS 125*  --   --   --   --   --   --    BILITOT <0.15*  --   --   --   --   --   --    POCGLU  --  94 159* 97 110* 88 96     ABG:  Lab Results   Component Value Date    PHART 7.459 03/09/2022    LZQ6QRR 39.9 03/09/2022    PO2ART 107.0 03/09/2022    DLQ8QWC 28.3 03/09/2022    NBEA 0.1 03/07/2022    PBEA 4.4 03/09/2022    V4MDOCGM 97.4 03/09/2022    FIO2 40 03/09/2022     Lab Results   Component Value Date/Time    SPECIAL RIGHT 03/10/2022 11:00 AM     Lab Results   Component Value Date/Time    CULTURE NO SIGNIFICANT GROWTH 06/12/2022 09:33 PM    CULTURE NO SIGNIFICANT GROWTH 06/12/2022 09:33 PM       Radiology:  XR CHEST (2 VW)    Result Date: 6/7/2022  1. No definite acute pulmonary disease. 2.  Pulmonary sequela typical of that seen with smoking, including possible COPD; correlate with clinical history. 3. Calcific atherosclerosis aorta. 4. Cardiomegaly. XR CHEST PORTABLE    Result Date: 6/12/2022  Increasing small-moderate pleural effusions and bibasilar atelectasis. Assessment:        Primary Problem  Bradycardia    Active Hospital Problems    Diagnosis Date Noted    Obesity (BMI 30-39. 9) [E66.9] 06/14/2022 Priority: Medium    Bradycardia [R00.1] 06/12/2022     Priority: Medium    Acute kidney injury superimposed on chronic kidney disease ([facility] [N17.9, N18.9] 06/08/2022     Priority: Medium    Anticoagulated [Z79.01] 06/08/2022     Priority: Medium    Paroxysmal atrial fibrillation ([facility] [I48.0] 07/28/2020    Chronic diastolic CHF (congestive heart failure) ([facility] [I50.32] 02/20/2020    COPD (chronic obstructive pulmonary disease) ([facility] [J44.9] 02/20/2020    Coronary artery disease involving native coronary artery of native heart without angina pectoris [I25.10]     Stage 3 chronic kidney disease ([facility] [N18.30]     Type 2 diabetes mellitus with stage 3 chronic kidney disease, without long-term current use of insulin ([facility] [E11.22, N18.30]          Plan:        Observe for symptomatic bradycardia  Titrate meds as needed  Amiodarone on hold  Metoprolol resumed at 12.5 mg daily  Optimize cardio-pulmonary function   Lisinopril allergic  Cardiology eval & f/u as scheduled [facility]  Outpatient Holter to be arranged  Eliquis as ordered: History of atrial fib  Check bun and creatinine  Bumex on hold  Gentle hydration  Renal follow-up Dr Nasim Copeland  Ongoing wound care to lower extremities  Risk factor management / weight loss    Deferred discharge at this time due to rising creatinine  DVT prophylaxis    IP CONSULT  N [facility],   6/14/2022  2:00 PM

## 2022-06-14 NOTE — FLOWSHEET NOTE
SPIRITUAL CARE DEPARTMENT - [facility]  PROGRESS NOTE    Shift date: 6/14/2022  Shift day: Tuesday   Shift # 1    Room # 4964/3426-01   Name: Justice Hull                [employer]ist: Alyson Barroso     Referral: Routine Visit    Admit Date & Time: 6/13/2022  1:27 AM    Assessment:  Justice Hull is a 66 y.o. female in the hospital following long term home care by daughter. Upon entering the room writer observes pt daughter, Aida Boyle, at bedside and medical team doing pt care. I had the privilege of speaking with pt's daughter Aida Boyle while pt received medical care. Daughter dropped out of college (3 classes short) 4 years ago in order to provide care for pt in her home. Pt and daughter are of [address] [facility] but have been unable to participate in a [employer] d/t pt's health. Daughter lacks support system apart from pt. Daughter was interested in completion of AD for pt, but pt is unable to engage in that conversation at this time. Pt was pleased to meet writer and opened eyes and attempted verbal responses. Pt accepted prayer and voiced concerns. Intervention:  Writer introduced self and title as  Writer offered space for daughter  to express feelings, needs, and concerns and provided a ministry presence. Writer explored support system and sandra background with daughter. Writer encouraged renewing some old social habits including meeting with a [employer] as she had in the past, encouraging daughter to care for self in order to care for mom. Writer, daughter, and pt hed hands and prayed together. Outcome:  Pt and daughter expressed gratitude    Plan:  Chaplains will remain available to offer spiritual and emotional support as needed.       Electronically signed by Kyleigh Araujo on 6/14/2022 at 3:17 PM.  [facility] [place]  [phone]         06/14/22 2903   Encounter Summary   Service Provided For: Patient and family together   Referral/Consult From: Rounding   Last Encounter  06/14/22   Begin Time 1500   End Time  1530   Total Time Calculated 30 min   Encounter    Type Initial Screen/Assessment   Spiritual/Emotional needs   Type Spiritual Support   Assessment/Intervention/Outcome   Assessment Calm; Impaired resilience; Interrupted family processes   Intervention Active listening;Discussed belief system/[employer] practices/sandra;Discussed illness injury and its impact; Discussed relationship with God;Explored/Affirmed feelings, thoughts, concerns;Explored Coping Skills/Resources;Prayer (assurance of)/[place];Sustaining Presence/Ministry of presence   Outcome Engaged in conversation;Expressed Gratitude

## 2022-06-15 ENCOUNTER — HOSPITAL ENCOUNTER (OUTPATIENT)
Dept: WOUND CARE | Age: 72
Discharge: HOME | End: 2022-06-15

## 2022-06-15 VITALS
DIASTOLIC BLOOD PRESSURE: 80 MMHG | SYSTOLIC BLOOD PRESSURE: 168 MMHG | HEIGHT: 59 IN | WEIGHT: 152.34 LBS | RESPIRATION RATE: 14 BRPM | BODY MASS INDEX: 30.71 KG/M2 | OXYGEN SATURATION: 94 % | HEART RATE: 71 BPM | TEMPERATURE: 98.4 F

## 2022-06-15 PROBLEM — R79.89 TSH ELEVATION: Status: ACTIVE | Noted: 2022-06-15

## 2022-06-15 LAB
ALBUMIN SERPL-MCNC: 2.2 G/DL (ref 3.5–5.2)
ALBUMIN/GLOBULIN RATIO: 0.8 (ref 1–2.5)
ALP BLD-CCNC: 136 U/L (ref 35–104)
ALT SERPL-CCNC: 17 U/L (ref 5–33)
ANION GAP SERPL CALCULATED.3IONS-SCNC: 12 MMOL/L (ref 9–17)
AST SERPL-CCNC: 20 U/L
BILIRUB SERPL-MCNC: 0.24 MG/DL (ref 0.3–1.2)
BUN BLDV-MCNC: 69 MG/DL (ref 8–23)
CALCIUM SERPL-MCNC: 8.5 MG/DL (ref 8.6–10.4)
CHLORIDE BLD-SCNC: 104 MMOL/L (ref 98–107)
CO2: 20 MMOL/L (ref 20–31)
CREAT SERPL-MCNC: 2.34 MG/DL (ref 0.5–0.9)
GFR AFRICAN AMERICAN: 25 ML/MIN
GFR NON-AFRICAN AMERICAN: 20 ML/MIN
GFR SERPL CREATININE-BSD FRML MDRD: ABNORMAL ML/MIN/{1.73_M2}
GLUCOSE BLD-MCNC: 78 MG/DL (ref 65–105)
GLUCOSE BLD-MCNC: 90 MG/DL (ref 65–105)
GLUCOSE BLD-MCNC: 93 MG/DL (ref 70–99)
PHOSPHORUS: 4.4 MG/DL (ref 2.6–4.5)
POTASSIUM SERPL-SCNC: 4.5 MMOL/L (ref 3.7–5.3)
REASON FOR REJECTION: NORMAL
SODIUM BLD-SCNC: 136 MMOL/L (ref 135–144)
TOTAL PROTEIN: 5.1 G/DL (ref 6.4–8.3)
ZZ NTE CLEAN UP: ORDERED TEST: NORMAL
ZZ NTE WITH NAME CLEAN UP: SPECIMEN SOURCE: NORMAL

## 2022-06-15 PROCEDURE — 82947 ASSAY GLUCOSE BLOOD QUANT: CPT

## 2022-06-15 PROCEDURE — 6370000000 HC RX 637 (ALT 250 FOR IP): Performed by: NURSE PRACTITIONER

## 2022-06-15 PROCEDURE — 80053 COMPREHEN METABOLIC PANEL: CPT

## 2022-06-15 PROCEDURE — 84100 ASSAY OF PHOSPHORUS: CPT

## 2022-06-15 PROCEDURE — 2580000003 HC RX 258: Performed by: NURSE PRACTITIONER

## 2022-06-15 PROCEDURE — 6370000000 HC RX 637 (ALT 250 FOR IP): Performed by: INTERNAL MEDICINE

## 2022-06-15 PROCEDURE — 2580000003 HC RX 258: Performed by: INTERNAL MEDICINE

## 2022-06-15 PROCEDURE — 99239 HOSP IP/OBS DSCHRG MGMT >30: CPT | Performed by: INTERNAL MEDICINE

## 2022-06-15 PROCEDURE — 36415 COLL VENOUS BLD VENIPUNCTURE: CPT

## 2022-06-15 RX ORDER — GUAIFENESIN 600 MG/1
600 TABLET, EXTENDED RELEASE ORAL 2 TIMES DAILY
Status: DISCONTINUED | OUTPATIENT
Start: 2022-06-15 | End: 2022-06-15

## 2022-06-15 RX ORDER — HYDRALAZINE HYDROCHLORIDE 25 MG/1
75 TABLET, FILM COATED ORAL 3 TIMES DAILY
Qty: 90 TABLET | Refills: 3 | Status: SHIPPED | OUTPATIENT
Start: 2022-06-15 | End: 2022-06-17 | Stop reason: SDUPTHER

## 2022-06-15 RX ORDER — ASPIRIN 81 MG/1
81 TABLET ORAL DAILY
Qty: 30 TABLET | Refills: 3 | Status: SHIPPED | OUTPATIENT
Start: 2022-06-16 | End: 2022-06-17 | Stop reason: SDUPTHER

## 2022-06-15 RX ORDER — GUAIFENESIN 600 MG/1
600 TABLET, EXTENDED RELEASE ORAL 2 TIMES DAILY PRN
Status: DISCONTINUED | OUTPATIENT
Start: 2022-06-15 | End: 2022-06-15 | Stop reason: HOSPADM

## 2022-06-15 RX ADMIN — GUAIFENESIN 600 MG: 600 TABLET, EXTENDED RELEASE ORAL at 03:55

## 2022-06-15 RX ADMIN — SODIUM CHLORIDE: 4.5 INJECTION, SOLUTION INTRAVENOUS at 04:00

## 2022-06-15 RX ADMIN — SODIUM CHLORIDE, PRESERVATIVE FREE 10 ML: 5 INJECTION INTRAVENOUS at 10:07

## 2022-06-15 RX ADMIN — ACETAMINOPHEN 650 MG: 325 TABLET ORAL at 12:03

## 2022-06-15 RX ADMIN — ACETAMINOPHEN 650 MG: 325 TABLET ORAL at 05:46

## 2022-06-15 RX ADMIN — HYDRALAZINE HYDROCHLORIDE 75 MG: 50 TABLET, FILM COATED ORAL at 10:07

## 2022-06-15 RX ADMIN — METOPROLOL TARTRATE 12.5 MG: 25 TABLET ORAL at 10:07

## 2022-06-15 RX ADMIN — Medication 1 TABLET: at 10:09

## 2022-06-15 RX ADMIN — APIXABAN 2.5 MG: 2.5 TABLET, FILM COATED ORAL at 10:06

## 2022-06-15 RX ADMIN — Medication 81 MG: at 10:06

## 2022-06-15 RX ADMIN — CETIRIZINE HYDROCHLORIDE 5 MG: 10 TABLET ORAL at 10:06

## 2022-06-15 RX ADMIN — ANTI-FUNGAL POWDER MICONAZOLE NITRATE TALC FREE: 1.42 POWDER TOPICAL at 10:08

## 2022-06-15 RX ADMIN — FERROUS SULFATE TAB EC 325 MG (65 MG FE EQUIVALENT) 325 MG: 325 (65 FE) TABLET DELAYED RESPONSE at 10:07

## 2022-06-15 ASSESSMENT — PAIN SCALES - GENERAL
PAINLEVEL_OUTOF10: 0
PAINLEVEL_OUTOF10: 0
PAINLEVEL_OUTOF10: 4

## 2022-06-15 ASSESSMENT — PAIN DESCRIPTION - LOCATION: LOCATION: HEAD

## 2022-06-15 ASSESSMENT — ENCOUNTER SYMPTOMS
NAUSEA: 0
SHORTNESS OF BREATH: 0
VOMITING: 0
ABDOMINAL PAIN: 0
COUGH: 0

## 2022-06-15 ASSESSMENT — PAIN DESCRIPTION - DESCRIPTORS: DESCRIPTORS: ACHING

## 2022-06-15 NOTE — DISCHARGE SUMMARY
[facility]  Office: [address], DO, Adrien Gunn, DO, Alexander Darden, DO, Meri Ko Blood, DO, Zaria Degroot MD, Nazia Vogel MD, Kirti Raya MD, Gerald Bazzi MD, Hieu Alexander MD, Isela Gutiérrez MD, Nayla High MD, Anjana Contreras, DO, Luis Eduardo Sprague MD,  Jennifer Chen, DO, Elsa Menchaca MD, Marguerite Mas MD, Aneta Cortes MD, Yolie Espino DO, Polly Landers MD, Usha Webb MD, Damien Pizarro DO, Bo Calixto MD, Elizabet Chaudhary [facility], [facility], CNP, Rafael Centeno, CNP, Kala Ashford, CNP, Jessy Sarabia, CNP, Fabi Lantigua, CNP, David Denny PA-C, Virginia Chakraborty, DNP, Jason Holden, CNP, Jenny Gan, CNP, Inge Parisi, CNP, Arthur Pisano, CNS, Jaimie Hodges, DNP, Aguila Marie, CNP, Haven Anderson, CNP, Suki Chaudhary, [facility]    Discharge Summary    Patient ID: Dylan Taylor  :     MRN: [id number]     ACCOUNT:  [de-identified]   Patient's PCP: Tamiko Gonzalez MD  Admit Date: 2022   Discharge Date: 6/15/2022    Discharge Physician: Teodoro Wiggins DO     The patient was seen and examined on day of discharge and this discharge summary is in conjunction with any daily progress note from day of discharge. Active Discharge Diagnoses:     Primary Problem  Bradycardia      Hospital Problems  Active Hospital Problems    Diagnosis Date Noted    TSH elevation [R79.89] 06/15/2022     Priority: Medium    Obesity (BMI 30-39. 9) [E66.9] 2022     Priority: Medium    Bradycardia [R00.1] 2022     Priority: Medium    Acute kidney injury superimposed on chronic kidney disease ([facility] [facility] [N17.9, N18.9] 2022     Priority: Medium    Anticoagulated [Z79.01] 2022 SEEN 7/20/21 WITH CB APT FOR AWV WITH [facility] ON 9/23/21 Priority: Medium    Paroxysmal atrial fibrillation (HCC) [I48.0] 07/28/2020    Chronic diastolic CHF (congestive heart failure) ([facility] [I50.32] 02/20/2020    COPD (chronic obstructive pulmonary disease) ([facility] [J44.9] 02/20/2020    Coronary artery disease involving native coronary artery of native heart without angina pectoris [I25.10]     Stage 3 chronic kidney disease ([facility] [N18.30]     Type 2 diabetes mellitus with stage 3 chronic kidney disease, without long-term current use of insulin ([facility] [E11.22, N18.30]          Hospital Course:     Brief History  As documented in the medical record:  \"70 year old female with diastolic CHF, a fib on AC, HTN, PVD, DM and CKD who was recently discharged from hospital for a CHF exacerbation. At that time her metoprolol and norvasc was increased prior to discharged. Patient went home and reported vague symptoms such as fatigue. Work up revealed a low HR. Patient was taken to Local ER where she was found to have sinus bradycardia. Transferred to a facility with cardio services for concern for possible pacer placement however etiology appears secondary to medication and no signs of ischemia. Patients HR has been trending back up. Her metoprolol and amlodipine are still on hold. Patient denies any Chest pain, nausea, vomit, diaphoresis, cough, fever. She reports still some dyspnea and edema from her recent hospitalization. Admitted for further work up and evaluation \"     The intitial assessment and plan included:  \"          Hospital Problems            Last Modified POA     * (Principal) Bradycardia 6/13/2022 Yes       #Bradycardia  -HR improved. currently 61 and 68. Earlier it was 34   -Etiology likely medication induced as her medications recently increased her metoprolol and norvasc while being managed for CHF .  Discharged 1 day prior to admission   -currently not on her med rec  -Underlying CAD with hx of CABG, CHF, DM  -EKG: Sinus  -Troponin 37-->40  -T4 wnml  -Echo 7/27/2020 04:49 3/18/2021 14:15 5/25/2021 21:08 12/6/2021 11:50 6/12/2022 15:28   TSH Latest Ref Range: 0.30 - 5.00 uIU/mL 1.39 2.08 2.06 2.60 5.51 (H)   Thyroxine, Free Latest Ref Range: 0.93 - 1.70 ng/dL         0.98                  The patient's condition was stabilized  Discharge planning initiated        Discharge plan:     Observe for symptomatic bradycardia  Titrate meds as needed  Amiodarone on hold  Metoprolol resumed at 12.5 mg daily  Optimize cardio-pulmonary function   Lisinopril allergic  Cardiology eval & f/u as scheduled [facility]  Outpatient Holter to be arranged  Eliquis as ordered: History of atrial fib  Check bun and creatinine, BMP one week  Bumex on hold  Gentle hydration  Renal follow-up Dr Kohler Areas  Ongoing wound care to lower extremities  Risk factor management / weight loss   Trend thyroid function  Observe for hypothyroidism  Discharge planning  Will discharge when arrangements complete and ok with other services. Follow-up with PCP in one week, Sweta Danielle MD  Notify PCP of discharge  Med rec done  WILL done  DCP 36 min+    Discharge Procedure Orders   Basic Metabolic Panel   Standing Status: Future Standing Exp.  Date: 06/15/23       Significant Diagnostic Studies:     Labs / Micro:  Labs:  Hematology:  Recent Labs     06/14/22 0256 06/14/22 2052   WBC  --  9.7   RBC  --  3.65*   HGB  --  11.2*   HCT  --  36.4   MCV  --  99.7   MCH  --  30.7   MCHC  --  30.8   RDW  --  15.8*   PLT  --  188   MPV  --  10.4   INR 1.0  --      Chemistry:  Recent Labs     06/12/22  1808 06/14/22  0256 06/14/22  2052 06/15/22  0807   NA  --  139 135 136   K  --  5.1 4.9 4.5   CL  --  106 104 104   CO2  --  20 19* 20   GLUCOSE  --  99 129* 93   BUN  --  77* 77* 69*   CREATININE  --  2.45* 2.43* 2.34*   ANIONGAP  --  13 12 12   LABGLOM  --  19* 20* 20*   GFRAA  --  23* 24* 25*   CALCIUM  --  8.3* 8.2* 8.5*   PHOS  --   --   --  4.4   TROPHS 40*  --   --   --      Recent Labs     06/13/22 1953 06/14/22  0832 06/14/22  1210 06/14/22  1955 06/15/22  0705 06/15/22  0807 06/15/22  1210   PROT  --   --   --   --   --  5.1*  --    LABALBU  --   --   --   --   --  2.2*  --    AST  --   --   --   --   --  20  --    ALT  --   --   --   --   --  17  --    ALKPHOS  --   --   --   --   --  136*  --    BILITOT  --   --   --   --   --  0.24*  --    POCGLU 110* 88 96 116* 78  --  90         Radiology:    XR CHEST (2 VW)    Result Date: 6/7/2022  EXAMINATION: TWO XRAY VIEWS OF THE CHEST 6/7/2022 9:38 pm COMPARISON: Chest 05/18/2022 HISTORY: ORDERING SYSTEM PROVIDED HISTORY: eval pulm edema TECHNOLOGIST PROVIDED HISTORY: eval pulm edema Reason for Exam: PT CO mild cough with SOB and fatigue X several days. FINDINGS: The cardiac silhouette is enlarged. Calcifications involving the aorta reflect atherosclerosis. The mediastinal and hilar silhouettes appear unremarkable. Chronic appearing coarse interstitial densities predominantly parahilar regions and lower lungs. Chronic appearing coarse interstitial densities predominate perihilar regions and lung bases, typical of sequela from smoking or other previous infectious/inflammatory process. No dense consolidation or pleural effusion evident. No pneumothorax is seen. No acute osseous abnormality is identified. Sequela from prior median sternotomy. Hyperkyphosis and barrel chest configuration. Bones appear osteoporotic. 1. No definite acute pulmonary disease. 2.  Pulmonary sequela typical of that seen with smoking, including possible COPD; correlate with clinical history. 3. Calcific atherosclerosis aorta. 4. Cardiomegaly. XR CHEST (2 VW)    Result Date: 5/18/2022  EXAMINATION: TWO XRAY VIEWS OF THE CHEST 5/18/2022 5:14 pm COMPARISON: 03/24/2022 HISTORY: ORDERING SYSTEM PROVIDED HISTORY: ams, chf TECHNOLOGIST PROVIDED HISTORY: ams, chf Reason for Exam: ams, chf FINDINGS: Cardiac size is stable. Stable opacity in the medial right base.   The pulmonary vascularity is hazy and indistinct. No pneumothorax. Small bilateral pleural effusions. Prominent naima suggesting pulmonary arterial hypertension. Postsurgical changes overlying the mediastinum. Mild pulmonary vascular congestion Prominent naima suggesting pulmonary arterial hypertension. XR CHEST PORTABLE    Result Date: 6/12/2022  EXAMINATION: ONE XRAY VIEW OF THE CHEST 6/12/2022 3:49 pm COMPARISON: 06/07/2022 HISTORY: ORDERING SYSTEM PROVIDED HISTORY: bradycardia TECHNOLOGIST PROVIDED HISTORY: bradycardia Reason for Exam: SOB. Pt. states history of asthma FINDINGS: Evidence of prior CABG with median sternotomy wires and mediastinal clips. Stable mild cardiomegaly. Increasing small-moderate bilateral pleural effusions and bibasilar atelectasis. No pneumothorax. Increasing small-moderate pleural effusions and bibasilar atelectasis. Consultations:    Consults:     Final Specialist Recommendations/Findings:   IP CONSULT TO CARDIOLOGY  PALLIATIVE CARE EVAL        Discharged Condition:    Stable     Disposition: skilled nursing facility     Physician Follow Up:   No follow-up provider specified.      Activity:  activity as tolerated    Diet:  cardiac diet, diabetic diet and renal diet     Discharge Medications:      Medication List      START taking these medications    aspirin 81 MG EC tablet  Take 1 tablet by mouth daily  Start taking on: June 16, 2022        CHANGE how you take these medications    metoprolol tartrate 25 MG tablet  Commonly known as: LOPRESSOR  Take 0.5 tablets by mouth 2 times daily  What changed:   · medication strength  · how much to take        CONTINUE taking these medications    Adjust Fold Cane/York Handle Misc  Use daily for walking     * albuterol (2.5 MG/3ML) 0.083% nebulizer solution  Commonly known as: PROVENTIL  Take 3 mLs by nebulization every 6 hours as needed for Wheezing or Shortness of Breath     * albuterol sulfate  (90 Base) MCG/ACT inhaler  Commonly known as: ProAir HFA  Inhale 2 puffs into the lungs every 6 hours as needed for Wheezing or Shortness of Breath (cough)     amiodarone 200 MG tablet  Commonly known as: CORDARONE  Take 1 tablet by mouth daily     amLODIPine 10 MG tablet  Commonly known as: NORVASC  Take 1 tablet by mouth nightly     apixaban 2.5 MG Tabs tablet  Commonly known as: ELIQUIS  Take 1 tablet by mouth 2 times daily     atorvastatin 40 MG tablet  Commonly known as: LIPITOR  Take 1 tablet by mouth once daily     blood glucose monitor kit and supplies  1 kit by Other route three times daily Dispense Butterfly Elite CBG Device     * Blood Pressure Kit  1 kit by Does not apply route three times daily     * Blood Pressure Kit  Diagnosis: HTN. Needs to check blood pressure 1-2 times a day until stable, then once a day. Goal blood pressure less than 135/85, and above 110/60. Compressor/Nebulizer Misc  Nebulizer with compressor. Disposable Med Nebs 2 /month. Reusable Med Nebs 1 per 6 months. Aerosol Mask 1 per month. Replacement Filters 2 per month. All other related supplies as needed per month. Medications being used: Albuterol . 083 unit dose vial. Frequency: every 6 hrs x 4/day . Length of Need: 1     desloratadine 5 MG tablet  Commonly known as: CLARINEX  Take 1 tablet by mouth once daily     ferrous sulfate 325 (65 Fe) MG tablet  Commonly known as: IRON 325  Take 1 tablet by mouth daily (with breakfast)     FreeStyle Lancets Misc  1 each by Does not apply route daily Patient needs to contact office before any further refills will be approved     FREESTYLE LITE strip  Generic drug: blood glucose test strips  1 each by In Vitro route daily As needed.      Handicap Placard Misc  by Does not apply route Expires on 12/30/25     hydrALAZINE 25 MG tablet  Commonly known as: APRESOLINE  Take 3 tablets by mouth 3 times daily     magnesium oxide 400 (241.3 Mg) MG Tabs tablet  Commonly known as: MAG-OX  Take 1 tablet by mouth twice daily     miconazole 2 % powder  Commonly known as: MICOTIN  Apply topically 2 times daily UNDER THE SKIN FOLDS LONG TERM     saline nasal gel Gel  by Nasal route as needed for Congestion For nose bleeds, 2-3 times a day intranasal prn     therapeutic multivitamin-minerals tablet  Take 1 tablet by mouth daily     vitamin D 1.25 MG (71513 UT) Caps capsule  Commonly known as: ERGOCALCIFEROL  Take 1 capsule by mouth once a week Sundays         * This list has 4 medication(s) that are the same as other medications prescribed for you. Read the directions carefully, and ask your doctor or other care provider to review them with you. STOP taking these medications    bumetanide 0.5 MG tablet  Commonly known as: BUMEX           Where to Get Your Medications      These medications were sent to [address]  [address]    Phone: [phone]   · aspirin 81 MG EC tablet  · hydrALAZINE 25 MG tablet  · metoprolol tartrate 25 MG tablet         Time Spent on discharge is  36 mins in patient examination, evaluation, counseling, medication reconciliation, discharge plan and follow up. Electronically signed by   Anushka Gomez DO  6/15/2022  5:58 PM      Thank you Dr. Martine Mancia MD for the opportunity to be involved in this patient's care.

## 2022-06-15 NOTE — PLAN OF CARE
Problem: Discharge Planning  Goal: Discharge to home or other facility with appropriate resources  6/15/2022 1237 by Lidia Hernandez RN  Outcome: Completed  6/15/2022 1128 by Lidia Hernandez RN  Outcome: Adequate for Discharge  Flowsheets (Taken 6/15/2022 0800)  Discharge to home or other facility with appropriate resources: Identify barriers to discharge with patient and caregiver  6/15/2022 0729 by Lidia Hernandez RN  Outcome: Progressing     Problem: Safety - Adult  Goal: Free from fall injury  6/15/2022 1237 by Lidia Hernandez RN  Outcome: Completed  6/15/2022 1128 by Lidia Hernandez RN  Outcome: Adequate for Discharge  6/15/2022 0729 by Lidia Hernandez RN  Outcome: Progressing     Problem: Chronic Conditions and Co-morbidities  Goal: Patient's chronic conditions and co-morbidity symptoms are monitored and maintained or improved  6/15/2022 1237 by Lidia Hernandez RN  Outcome: Completed  6/15/2022 1128 by Lidia Hernandez RN  Outcome: Adequate for Discharge  Flowsheets (Taken 6/15/2022 0800)  Care Plan - Patient's Chronic Conditions and Co-Morbidity Symptoms are Monitored and Maintained or Improved: Monitor and assess patient's chronic conditions and comorbid symptoms for stability, deterioration, or improvement  6/15/2022 0729 by Lidia Hernandez RN  Outcome: Progressing     Problem: ABCDS Injury Assessment  Goal: Absence of physical injury  6/15/2022 1237 by Lidia Hernandez RN  Outcome: Completed  6/15/2022 1128 by Lidia Hernandez RN  Outcome: Adequate for Discharge  6/15/2022 0729 by Lidia Hernandez RN  Outcome: Progressing     Problem: Skin/Tissue Integrity  Goal: Absence of new skin breakdown  Description: 1. Monitor for areas of redness and/or skin breakdown  2. Assess vascular access sites hourly  3. Every 4-6 hours minimum:  Change oxygen saturation probe site  4.   Every 4-6 hours:  If on nasal continuous positive airway pressure, respiratory therapy assess nares and determine need for appliance change or resting period.   6/15/2022 1237 by Giselle Champagne RN  Outcome: Completed  6/15/2022 1128 by Giselle Champagne RN  Outcome: Adequate for Discharge  6/15/2022 0729 by Giselle Champagne RN  Outcome: Progressing     Problem: Pain  Goal: Verbalizes/displays adequate comfort level or baseline comfort level  6/15/2022 1237 by Giselle Champagne RN  Outcome: Completed  6/15/2022 1128 by Giselle Champagne RN  Outcome: Adequate for Discharge  6/15/2022 0729 by Giselle Champagne RN  Outcome: Progressing

## 2022-06-15 NOTE — PROGRESS NOTES
CLINICAL PHARMACY NOTE: MEDS TO BEDS    Total # of Prescriptions Filled: 3   The following medications were delivered to the patient:  · Metoprolol tartrate 25mg tab  · Aspirin low dose 81 mg tab  · Hydralazine 25 mg tab    Additional Documentation:Medications delivered to daughter in room 402 @ 1:30pm.

## 2022-06-15 NOTE — PROGRESS NOTES
[facility]  Office: [address], DO, Manjula Arteaga, DO, Rafael Luna, DO, Luli Maldonado Blood, DO, Emilia Oneill MD, Frances Cavazos MD, Saray Shafer MD, Estefany Callejas MD, Demetrice Martinez MD, Nevaeh Barber MD, Jonas Rodriges MD, Escobar Velazquez, DO, Janeth Rodríguez MD,  Jesus Storey, DO, Laverne Christianson MD, Chelsey Barrientos MD, Rosemary Hayes MD, Coye Meigs, DO, Luther Vega MD, Henry Adam MD, Ross Lebron DO, Andrew Sorensen MD, Dani Hargrove, [facility], [facility], CNP, Eugene Velez, CNP, Shalya Cheng, CNP, Taniya Phillips, CNP, Marika Dumont, CNP, TABATHA CardozaC, Soni Barrett, DNP, Maximus Steele, CNP, Wendy Sheldon, CNP, Gris Kennedy, CNP, Yany Zargaoza, CNS, Haley Johnson, [facility], Ty Sanchez, CNP, Dann Easley, CNP, Yung Alfred, [facility]    Progress Note    6/15/2022    10:44 AM    Name:   Brielle Mcconnell  MRN:     [id number]     Acct:      [de-identified]   Room:   [address] [place] [address] Day:  2  Admit Date:  6/13/2022  1:27 AM    PCP:   Kris Lu MD  Code Status:  Full Code    Subjective:     C/C:   Fatigue  Bradycardia    Interval History Status:   Improved  No dizziness  Feeling better  No symptomatic bradycardia  Patient hoping for discharge today    Data Base Updates:  Heart rates have generally been in the 76s and [address] 2 Low g/dL   Albumin/Globulin Ratio0.8 Low    Alkaline [id number] High U/L     ALT17U/L Nga burch okay okay yeah I I received a call transportation just to bring you up to lobe up to speed in the loop of  AST20U/L   Total Bilirubin0.24 Low mg/dL     BUN69 High mg/dL   CREATININE2.34 High    Renal function appears to have plateaued and improved      Brief History:     As documented in the medical record:  \"70 year old female with diastolic CHF, a fib on AC, HTN, PVD, DM and CKD who was recently discharged from hospital for a CHF exacerbation.  At that time her metoprolol and norvasc was increased prior to discharged. Patient went home and reported vague symptoms such as fatigue. Work up revealed a low HR. Patient was taken to Local ER where she was found to have sinus bradycardia. Transferred to a facility with cardio services for concern for possible pacer placement however etiology appears secondary to medication and no signs of ischemia. Patients HR has been trending back up. Her metoprolol and amlodipine are still on hold. Patient denies any Chest pain, nausea, vomit, diaphoresis, cough, fever. She reports still some dyspnea and edema from her recent hospitalization. Admitted for further work up and evaluation \"    The intitial assessment and plan included:  [facility] Problems            Last Modified POA     * (Principal) Bradycardia 6/13/2022 Yes       #Bradycardia  -HR improved. currently 61 and 68. Earlier it was 34   -Etiology likely medication induced as her medications recently increased her metoprolol and norvasc while being managed for CHF . Discharged 1 day prior to admission   -currently not on her med rec  -Underlying CAD with hx of CABG, CHF, DM  -EKG: Sinus  -Troponin 37-->40  -T4 wnml  -Echo from 3/14/22: 60-65 %., left atrium mildly dilated   -CXR: Increasing small-moderate pleural effusions and bibasilar atelectasis. -Cath report from 7/2020:   PLAN  -Unlikely to need intervention given etiology likely medication induced. Will need to be placed back on metoprolol at initial dose. Will defer dosing to Cardiology who is aware of patient. -Monitor on tele, prn atropine, pacer pads at bedside      #JOY on CKD3  -Cr on admission 2.3, BUN 74, bicarb 74, K is 5.3  -Baseline around 1.7  -Concern for mild CHF  -Admin diuretics , albumin 2.2   -concern for CHF development, poor fwd perfusion-->will admin bumex at increase dose. #Chronic diastolic HF  -just DC from hospital 1 day prior  -ProBNP elevated over baseline with JOY on CKD  -BLT crackles.  CXR with some vascular congestion, trace pitting edema  -Home bumex increased to BID--< monitor elevtrolytes, BP, renal function,      #A. Fib  -Diagnosed in 2020   -On apixaban 2.5 mg BID, Amiodarine 200mg  -Home metoprolol on hold. Initially was on 50mg BID then recently increased on discharge ffor chf 1 day prior to hospitalizations   -Thyroid function wnml  -Echo from 3/14/22: 60-65 %., left atrium mildly dilated      #DM2  -Glucose on admission 200  -Glucose goal while inpatient 140-180  -Hold oral home meds  -Sliding scale, DM diet      VTE ppx: lovenox\"    The patient's bradycardia responded to conservative treatment and medication titration    6/14  Discharge planning held due to rising creatinine  Results for Khurram Bailon (MRN [id number]) as of 6/14/2022 13:39   Ref. Range 6/9/2022 06:00 6/10/2022 06:22 6/11/2022 07:43 6/12/2022 15:28 6/14/2022 02:56   Creatinine Latest Ref Range: 0.50 - 0.90 mg/dL 1.81 (H) 1.66 (H) 1.85 (H) 2.30 (H) 2.45 (H)   Bumex was held      pH, Niraj 7.313 Low     pCO2, Niraj 46.7    pO2, Niraj 63.1 High     HCO3, Venous 22.9 Low       Results for Khurram Bailon (MRN [id number]) as of 6/15/2022 10:27   Ref.  Range 7/27/2020 04:49 3/18/2021 14:15 5/25/2021 21:08 12/6/2021 11:50 6/12/2022 15:28   TSH Latest Ref Range: 0.30 - 5.00 uIU/mL 1.39 2.08 2.06 2.60 5.51 (H)   Thyroxine, Free Latest Ref Range: 0.93 - 1.70 ng/dL     0.98     The patient's condition was stabilized  Discharge planning initiated    Past Medical History:   has a past medical history of Acute CVA (cerebrovascular accident) ([facility] Altered mental status, Arthritis, Brain mass, Cellulitis and abscess, Cellulitis and abscess of unspecified site, Cellulitis of both lower extremities, Cellulitis of left lower extremity, Cellulitis of lower extremity, CHF (congestive heart failure) ([facility] [address] Chronic kidney disease, unspecified, Coronary atherosclerosis of native coronary artery, Essential hypertension, Essential hypertension, malignant, Hallucinations, Hard of hearing, Head contusion, Hypokalemia, Iron deficiency anemia, unspecified, Meningioma ([address] Myocardial infarction ([address] Other and unspecified hyperlipidemia, Pure hypercholesterolemia, Toxic metabolic encephalopathy, Type II or unspecified type diabetes mellitus without mention of complication, not stated as uncontrolled, Unspecified asthma(493.90), Unspecified venous (peripheral) insufficiency, Unspecified vitamin D deficiency, and UTI (urinary tract infection). Social History:   reports that she quit smoking about 22 years ago. She has a 2.50 pack-year smoking history. She has never used smokeless tobacco. She reports previous alcohol use. She reports that she does not use drugs. Family History:   Family History   Problem Relation Age of Onset    Diabetes Mother     Heart Disease Mother     Heart Disease Father     Diabetes Sister     High Blood Pressure Sister     Diabetes Maternal Grandmother        Review of Systems:     Review of Systems   Constitutional: Positive for activity change (Improved) and fatigue (Feels stronger). HENT: Positive for nosebleeds (The patient did have epistaxis this morning, \"blew her nose too hard\"). Respiratory: Negative for cough and shortness of breath. Cardiovascular: Positive for leg swelling (Chronic but improving). Negative for chest pain and palpitations. Gastrointestinal: Negative for abdominal pain, nausea and vomiting. Genitourinary: Negative for flank pain and hematuria. Skin: Positive for wound (Chronic lower extremity wounds). Neurological: Negative for dizziness.          Physical Examination:        Vitals  BP (!) 168/83   Pulse 73   Temp 97.5 °F (36.4 °C) (Oral)   Resp 19   Ht 4' 11\" (1.499 m)   Wt 152 lb 5.4 oz (69.1 kg)   SpO2 96%   BMI 30.77 kg/m²   Temp (24hrs), Av.7 °F (36.5 °C), Min:97.5 °F (36.4 °C), Max:98.2 °F (36.8 °C)    Recent Labs     22  0835 22  1210 22 06/15/22  0705   POCU 88 96 116* 78       Physical Exam  Vitals reviewed. Constitutional:       General: She is not in acute distress. Appearance: She is not diaphoretic. HENT:      Head: Normocephalic. Nose: Nose normal.   Eyes:      General: No scleral icterus. Conjunctiva/sclera: Conjunctivae normal.   Neck:      Trachea: No tracheal deviation. Cardiovascular:      Rate and Rhythm: Normal rate and regular rhythm. Pulmonary:      Effort: Pulmonary effort is normal. No respiratory distress. Breath sounds: Normal breath sounds. No wheezing or rales. Chest:      Chest wall: No tenderness. Abdominal:      General: There is no distension. Palpations: Abdomen is soft. Tenderness: There is no abdominal tenderness. Musculoskeletal:         General: No tenderness. Cervical back: Neck supple. Right lower leg: Edema present. Left lower leg: Edema present. Comments: 1/4 edema at the ankles   Skin:     General: Skin is warm and dry. Comments: Chronic lower extremity wounds: See pictures   Neurological:      Mental Status: She is alert and oriented to person, place, and time. Medications: Allergies:     Allergies   Allergen Reactions    Latex Rash    Aleve [Naproxen Sodium]      Chronic kidney disease stage III, CHF    Pioglitazone Other (See Comments)     Congestive heart failure    Claritin [Loratadine]     Keflex [Cephalexin]     Lisinopril      Needs clarification of contraindication       Current Meds:   Scheduled Meds:    metoprolol tartrate  12.5 mg Oral BID    apixaban  2.5 mg Oral BID    atorvastatin  40 mg Oral Nightly    cetirizine  5 mg Oral Daily    ferrous sulfate  325 mg Oral Daily with breakfast    hydrALAZINE  75 mg Oral TID    [Held by provider] magnesium oxide  400 mg Oral Daily    miconazole   Topical BID    therapeutic multivitamin-minerals  1 tablet Oral Daily    [START ON 6/19/2022] vitamin D  50,000 Units Oral Weekly    insulin lispro  0-12 Units SubCUTAneous TID WC    insulin lispro  0-6 Units SubCUTAneous Nightly    sodium chloride flush  5-40 mL IntraVENous 2 times per day    [Held by provider] bumetanide  1 mg Oral BID    aspirin  81 mg Oral Daily     Continuous Infusions:    sodium chloride 75 mL/hr at 06/15/22 0400    dextrose      sodium chloride       PRN Meds: guaiFENesin, albuterol, glucose, dextrose bolus **OR** dextrose bolus, glucagon (rDNA), dextrose, sodium chloride flush, sodium chloride, ondansetron **OR** ondansetron, polyethylene glycol, acetaminophen **OR** acetaminophen    Data:     I/O (24Hr): Intake/Output Summary (Last 24 hours) at 6/15/2022 1044  Last data filed at 6/15/2022 0930  Gross per 24 hour   Intake 1446.4 ml   Output 1300 ml   Net 146.4 ml       Labs:  Hematology:  Recent Labs     06/12/22  1528 06/14/22 0256 06/14/22 2052   WBC 6.1  --  9.7   RBC 3.61*  --  3.65*   HGB 10.9*  --  11.2*   HCT 34.7*  --  36.4   MCV 96.0  --  99.7   MCH 30.2  --  30.7   MCHC 31.5  --  30.8   RDW 16.6*  --  15.8*     --  188   MPV 8.5  --  10.4   INR 1.1 1.0  --      Chemistry:  Recent Labs     06/12/22  1528 06/12/22  1528 06/12/22 1808 06/14/22  0256 06/14/22  2052 06/15/22  0807      < >  --  139 135 136   K 5.3   < >  --  5.1 4.9 4.5      < >  --  106 104 104   CO2 21   < >  --  20 19* 20   GLUCOSE 203*   < >  --  99 129* 93   BUN 74*   < >  --  77* 77* 69*   CREATININE 2.30*   < >  --  2.45* 2.43* 2.34*   ANIONGAP 9   < >  --  13 12 12   LABGLOM 21*   < >  --  19* 20* 20*   GFRAA 25*   < >  --  23* 24* 25*   CALCIUM 8.3*   < >  --  8.3* 8.2* 8.5*   PHOS  --   --   --   --   --  4.4   PROBNP 10,293*  --   --   --   --   --    TROPHS 37*  --  40*  --   --   --     < > = values in this interval not displayed.      Recent Labs     06/12/22  1528 06/13/22  0799 06/13/22  1619 06/13/22 1953 06/14/22  8294 06/14/22  1210 06/14/22 1955 06/15/22  9940 06/15/22  0144 PROT 4.7*  --   --   --   --   --   --   --  5.1*   LABALBU 2.0*  --   --   --   --   --   --   --  2.2*   TSH 5.51*  --   --   --   --   --   --   --   --    AST 42*  --   --   --   --   --   --   --  20   ALT 22  --   --   --   --   --   --   --  17   ALKPHOS 125*  --   --   --   --   --   --   --  136*   BILITOT <0.15*  --   --   --   --   --   --   --  0.24*   POCGLU  --    < > 97 110* 88 96 116* 78  --     < > = values in this interval not displayed. ABG:  Lab Results   Component Value Date    PHART 7.459 03/09/2022    YUI3DPS 39.9 03/09/2022    PO2ART 107.0 03/09/2022    ZUM7OZE 28.3 03/09/2022    NBEA 0.1 03/07/2022    PBEA 4.4 03/09/2022    E0FPPRBZ 97.4 03/09/2022    FIO2 INFORMATION NOT PROVIDED 06/14/2022     Lab Results   Component Value Date/Time    SPECIAL RIGHT 03/10/2022 11:00 AM     Lab Results   Component Value Date/Time    CULTURE NO SIGNIFICANT GROWTH 06/12/2022 09:33 PM    CULTURE NO SIGNIFICANT GROWTH 06/12/2022 09:33 PM       Radiology:  XR CHEST (2 VW)    Result Date: 6/7/2022  1. No definite acute pulmonary disease. 2.  Pulmonary sequela typical of that seen with smoking, including possible COPD; correlate with clinical history. 3. Calcific atherosclerosis aorta. 4. Cardiomegaly. XR CHEST PORTABLE    Result Date: 6/12/2022  Increasing small-moderate pleural effusions and bibasilar atelectasis. Assessment:        Primary Problem  Bradycardia    Active Hospital Problems    Diagnosis Date Noted    TSH elevation [R79.89] 06/15/2022     Priority: Medium    Obesity (BMI 30-39. 9) [E66.9] 06/14/2022     Priority: Medium    Bradycardia [R00.1] 06/12/2022     Priority: Medium    Acute kidney injury superimposed on chronic kidney disease ([facility] [address] [N17.9, N18.9] 06/08/2022     Priority: Medium    Anticoagulated [Z79.01] 06/08/2022     Priority: Medium    Paroxysmal atrial fibrillation (HCC) [I48.0] 07/28/2020    [facility] [I50.32] 02/20/2020    COPD (chronic obstructive pulmonary disease) ([facility]) [J44.9] 02/20/2020    Coronary artery disease involving native coronary artery of native heart without angina pectoris [I25.10]     Stage 3 chronic kidney disease ([facility]) [N18.30]     Type 2 diabetes mellitus with stage 3 chronic kidney disease, without long-term current use of insulin ([facility] [address] [E11.22, N18.30]          Plan:        Observe for symptomatic bradycardia  Titrate meds as needed  Amiodarone on hold  Metoprolol resumed at 12.5 mg daily  Optimize cardio-pulmonary function   Lisinopril allergic  Cardiology eval & f/u as scheduled [facility]  Outpatient Holter to be arranged  Eliquis as ordered: History of atrial fib  Check bun and creatinine, BMP one week  Bumex on hold  Gentle hydration  Renal follow-up Dr Downs President  Ongoing wound care to lower extremities  Risk factor management / weight loss   Trend thyroid function  Observe for hypothyroidism  Discharge planning  Will discharge when arrangements complete and ok with other services.   Follow-up with PCP in one week, Flaquita Loza MD  Notify PCP of discharge  Med rec done  WILL done  DCP 36 min+    IP CONSULT  N [facility],   6/15/2022  10:44 AM

## 2022-06-15 NOTE — PLAN OF CARE
Problem: Discharge Planning  Goal: Discharge to home or other facility with appropriate resources  6/15/2022 1128 by Vickie Lara RN  Outcome: Adequate for Discharge  Flowsheets (Taken 6/15/2022 0800)  Discharge to home or other facility with appropriate resources: Identify barriers to discharge with patient and caregiver  6/15/2022 0729 by Vickie Lara RN  Outcome: Progressing     Problem: Safety - Adult  Goal: Free from fall injury  6/15/2022 1128 by Vickie Lara RN  Outcome: Adequate for Discharge  6/15/2022 0729 by Vickie Lara RN  Outcome: Progressing     Problem: Chronic Conditions and Co-morbidities  Goal: Patient's chronic conditions and co-morbidity symptoms are monitored and maintained or improved  6/15/2022 1128 by Vickie Lara RN  Outcome: Adequate for Discharge  Flowsheets (Taken 6/15/2022 0800)  Care Plan - Patient's Chronic Conditions and Co-Morbidity Symptoms are Monitored and Maintained or Improved: Monitor and assess patient's chronic conditions and comorbid symptoms for stability, deterioration, or improvement  6/15/2022 0729 by Vickie Lara RN  Outcome: Progressing     Problem: ABCDS Injury Assessment  Goal: Absence of physical injury  6/15/2022 1128 by Vickie Lara RN  Outcome: Adequate for Discharge  6/15/2022 0729 by Vickie Lara RN  Outcome: Progressing     Problem: Skin/Tissue Integrity  Goal: Absence of new skin breakdown  Description: 1. Monitor for areas of redness and/or skin breakdown  2. Assess vascular access sites hourly  3. Every 4-6 hours minimum:  Change oxygen saturation probe site  4. Every 4-6 hours:  If on nasal continuous positive airway pressure, respiratory therapy assess nares and determine need for appliance change or resting period.   6/15/2022 1128 by Vickie Lara RN  Outcome: Adequate for Discharge  6/15/2022 0729 by Vickie Lara RN  Outcome: Progressing     Problem: Pain  Goal: Verbalizes/displays adequate comfort level or baseline comfort level  6/15/2022 1128 by Jhonatan Marcano RN  Outcome: Adequate for Discharge  6/15/2022 0729 by Jhonatan Marcano RN  Outcome: Progressing

## 2022-06-15 NOTE — PROGRESS NOTES
--Pt DC'd to home. --Discharge instructions explained to pt, verbalized understanding. --Pharmacy brought medications to bedside,,   --Personal belongings given to pt  --IV removed by RN  --VSS no acute distress   balance intact, with walker. --Pt left by transport wheelchair.

## 2022-06-15 NOTE — DISCHARGE INSTR - COC
Continuity of Care Form    Patient Name: Dane Weaver   :    MRN:  [id number]    Admit date:  2022  Discharge date:  ***    Code Status Order: Full Code   Advance Directives:      Admitting Physician:  Garland Lamar DO  PCP: Governor Ashu MD    Discharging Nurse: [facility] Unit/Room#: 1268/1184-34  Discharging Unit Phone Number: ***    Emergency Contact:   Extended Emergency Contact Information  Primary Emergency Contact: Luisito N[address] Phone: [phone]  Relation: Child  Secondary Emergency Contact: CYRIL NELSON  Home Phone: [phone]  Mobile Phone: [phone]  Relation: Child  Preferred language: English    Past Surgical History:  Past Surgical History:   Procedure Laterality Date    COLONOSCOPY N/A 3/15/2022    COLONOSCOPY DIAGNOSTIC performed by Shona Singer MD at [facility]      x 3, Dr. Mark Nunez  3/7/2022         THORACENTESIS  3/10/2022         TONSILLECTOMY AND ADENOIDECTOMY      UPPER GASTROINTESTINAL ENDOSCOPY N/A 3/15/2022    EGD BIOPSY performed by Shona Singer MD at [address] [place] [address]       Immunization History: There is no immunization history on file for this patient. Active Problems:  Patient Active Problem List   Diagnosis Code    Vitamin D deficiency E55.9    Venous insufficiency of both lower extremities I87.2    Asthma J45.909    Type 2 diabetes mellitus with stage 3 chronic kidney disease, without long-term current use of insulin ([facility]) E11.22, N18.30    Coronary artery disease involving native coronary artery of native heart without angina pectoris I25.10    Iron deficiency anemia D50.9    Stage 3 chronic kidney disease (HCC) N18.30    Colonoscopy refused Z53.20    Pneumococcal vaccination declined Z28.21    Partial deafness of both ears H91.93    Chronic diastolic CHF (congestive heart failure) ([facility]) I50.32    COPD (chronic obstructive pulmonary disease) ([facility]) J44.9    HTN.  Benign hypertension with CKD (chronic kidney disease) stage III (HCC) I12.9, N18.30    Gout with tophi M1A. 9XX1    Hyperlipidemia with target LDL less than 70 E78.5    Dry skin dermatitis HANDS L85.3    Hypertensive emergency I16.1    Meningioma, cerebral (HCC) D32.0    Paroxysmal atrial fibrillation (HCC) I48.0    Influenza vaccination declined Z28.21    Recurrent UTI N39.0    Elevated LFTs R79.89    Cellulitis of both lower extremities L03.115, L03.116    Hypomagnesemia E83.42    Kidney stones N20.0    Diverticulosis K57.90    COVID-19 vaccination declined Z28.21    JOY (acute kidney injury) ([address] N17.9    Acute on chronic combined systolic (congestive) and diastolic (congestive) heart failure (HCC) I50.43    Chronic venous stasis dermatitis of both lower extremities I87.2    Symptomatic anemia D64.9    Family history of colon cancer Z80.0    Family history of pancreatic cancer Z80.0    Mild malnutrition ([facility]) E44.1    Decreased strength, endurance, and mobility R53.1, Z74.09, R68.89    Bilateral lower extremity edema R60.0    Non-pressure chronic ulcer of other part of left lower leg limited to breakdown of skin ([address] L97.821    Bleeding nose R04.0    Unintended weight loss R63.4    Lymphedema I89.0    Peripheral vascular disease, unspecified (HCC) I73.9    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 ([address] L97.811    Acute kidney injury superimposed on chronic kidney disease (HCC) N17.9, N18.9    Anticoagulated Z79.01    Hypertensive urgency with joy I16.0    Bradycardia R00.1    Obesity (BMI 30-39. 9) E66.9    TSH elevation R79.89       Isolation/Infection:   Isolation            No Isolation          Patient Infection Status       Infection Onset Added Last Indicated Last Indicated By Review Planned Expiration Resolved Resolved By    None active    Resolved    COVID-19 (Rule Out) 03/01/22 03/01/22 03/01/22 COVID-19 & Influenza Combo (Ordered)   03/01/22 Rule-Out Test Resulted    COVID-19 (Rule Out) 02/06/22 02/06/22 02/06/22 COVID-19 & Influenza Combo (Ordered)   02/06/22 Rule-Out Test Resulted    COVID-19 (Rule Out) 07/25/20 07/25/20 07/25/20 COVID-19 (Ordered)   07/25/20 Rule-Out Test Resulted            Nurse Assessment:  Last Vital Signs: BP (!) 168/83   Pulse 73   Temp 97.5 °F (36.4 °C) (Oral)   Resp 19   Ht 4' 11\" (1.499 m)   Wt 152 lb 5.4 oz (69.1 kg)   SpO2 96%   BMI 30.77 kg/m²     Last documented pain score (0-10 scale): Pain Level: 0  Last Weight:   Wt Readings from Last 1 Encounters:   06/15/22 152 lb 5.4 oz (69.1 kg)     Mental Status:  {IP PT MENTAL STATUS:20030}    IV Access:  { WILL IV ACCESS:156976217}    Nursing Mobility/ADLs:  Walking   {CHP DME AMFC:489996433}  Transfer  {CHP DME GWCX:601613344}  Bathing  {CHP DME VPXR:763551249}  Dressing  {CHP DME YQDJ:288073826}  Toileting  {CHP DME OFRY:744305664}  Feeding  {CHP DME IZNB:347792307}  Med Admin  {CHP DME OIGU:068050853}  Med Delivery   { WILL MED Delivery:602236407}    Wound Care Documentation and Therapy:  Wound 05/20/22 Right Wound #1 right lower  leg cluster- converged with #2 (Active)   Wound Image    06/14/22 1459   Wound Etiology Venous 06/15/22 0800   Dressing Status Clean;Dry; Intact 06/15/22 0800   Wound Cleansed Not Cleansed 06/15/22 0800   Dressing/Treatment Ace wrap 06/15/22 0800   Offloading for Diabetic Foot Ulcers Other (comment) 06/15/22 0800   Dressing Change Due 06/21/22 06/15/22 0800   Wound Length (cm) 0.1 cm 06/08/22 1405   Wound Width (cm) 0.1 cm 06/08/22 1405   Wound Depth (cm) 0.1 cm 06/08/22 1405   Wound Surface Area (cm^2) 0.01 cm^2 06/08/22 1405   Change in Wound Size % (l*w) 99.93 06/08/22 1405   Wound Volume (cm^3) 0.001 cm^3 06/08/22 1405   Wound Healing % 100 06/08/22 1405   Post-Procedure Length (cm) 12 cm 06/01/22 1324   Post-Procedure Width (cm) 30 cm 06/01/22 1324   Post-Procedure Depth (cm) 0.1 cm 06/01/22 1324   Post-Procedure Surface Area (cm^2) 360 cm^2 06/01/22 1324   Post-Procedure Volume (cm^3) 36 cm^3 06/01/22 1324   Wound Assessment Other (Comment) 06/15/22 0800   Drainage Amount None 06/15/22 0800   Drainage Description Serosanguinous; Serous; Yellow 06/15/22 0800   Odor None 06/15/22 0800   Marian-wound Assessment Other (Comment) 06/15/22 0800   Margins Other (Comment) 06/15/22 0800   Wound Thickness Description not for Pressure Injury Full thickness 06/15/22 0800   Number of days: 26       Wound 05/20/22 Left;Proximal Wound # 4 Left lower lat leg (Active)   Wound Image    06/14/22 1459   Wound Etiology Venous 06/15/22 0800   Dressing Status Clean;Dry; Intact 06/15/22 0800   Wound Cleansed Not Cleansed 06/15/22 0800   Dressing/Treatment Ace wrap 06/15/22 0800   Dressing Change Due 06/21/22 06/15/22 0800   Wound Length (cm) 0.1 cm 06/08/22 1405   Wound Width (cm) 0.1 cm 06/08/22 1405   Wound Depth (cm) 0.1 cm 06/08/22 1405   Wound Surface Area (cm^2) 0.01 cm^2 06/08/22 1405   Change in Wound Size % (l*w) 99.93 06/08/22 1405   Wound Volume (cm^3) 0.001 cm^3 06/08/22 1405   Wound Healing % 100 06/08/22 1405   Post-Procedure Length (cm) 13 cm 06/01/22 1324   Post-Procedure Width (cm) 11 cm 06/01/22 1324   Post-Procedure Depth (cm) 0 cm 06/01/22 1324   Post-Procedure Surface Area (cm^2) 143 cm^2 06/01/22 1324   Post-Procedure Volume (cm^3) 0 cm^3 06/01/22 1324   Wound Assessment Other (Comment) 06/15/22 0415   Drainage Amount None 06/15/22 0800   Drainage Description Serosanguinous 06/08/22 1405   Odor None 06/15/22 0800   Marian-wound Assessment Other (Comment) 06/15/22 0800   Margins Other (Comment) 06/15/22 0800   Wound Thickness Description not for Pressure Injury Full thickness 06/15/22 0800   Number of days: 26        Elimination:  Continence:    Bowel: {YES / US:09027}  Bladder: {YES / YT:72317}  Urinary Catheter: {Urinary Catheter:293417265}   Colostomy/Ileostomy/Ileal Conduit: {YES / :59056}       Date of Last BM: ***    Intake/Output Summary (Last 24 hours) at 6/15/2022 1038  Last data filed at 6/15/2022 0930  Gross per 24 hour   Intake 1446.4 ml   Output 1300 ml   Net 146.4 ml     I/O last 3 completed shifts: In: 1606.4 [P.O.:695; I.V.:911.4]  Out: 1775 [Urine:1775]    Safety Concerns:     [address] Safety Concerns:383614717}    Impairments/Disabilities:      [address] [facility] [employer] Impairments/Disabilities:130246820}    Nutrition Therapy:  Current Nutrition Therapy:   { WILL Diet List:736105741}    Routes of Feeding: {[facility] DME Other Feedings:085532972}  Liquids: {Slp liquid thickness:62468}  Daily Fluid Restriction: {[facility] DME Yes amt example:890557411}  Last Modified Barium Swallow with Video (Video Swallowing Test): {Done Not Done IYJD:768559252}    Treatments at the Time of Hospital Discharge:   Respiratory Treatments: ***  Oxygen Therapy:  {Therapy; copd oxygen:74670}  Ventilator:    { CC Vent AIYV:488362325}    Rehab Therapies: {THERAPEUTIC INTERVENTION:4377170098}  Weight Bearing Status/Restrictions: [facility] Weight Bearin}  Other Medical Equipment (for information only, NOT a DME order):  {EQUIPMENT:639563161}  Other Treatments: ***    Patient's personal belongings (please select all that are sent with patient):  {[facility] DME Belongings:065351165}    RN SIGNATURE:  {Esignature:641596276}    CASE MANAGEMENT/SOCIAL WORK SECTION    Inpatient Status Date: ***    Readmission Risk Assessment Score:  Readmission Risk              Risk of Unplanned Readmission:  40           Discharging to Facility/ Agency   Name:   Address:  Phone:  Fax:    Dialysis Facility (if applicable)   Name:  Address:  Dialysis Schedule:  Phone:  Fax:    / signature: {Esignature:563359227}    PHYSICIAN SECTION    Prognosis: Fair    Condition at Discharge: Stable    Rehab Potential (if transferring to Rehab):  Fair    Recommended Labs or Other Treatments After Discharge:   Observe for symptomatic bradycardia  Amiodarone on hold  Metoprolol resumed at 12.5 mg daily  Optimize cardio-pulmonary function Lisinopril allergic  Cardiology eval & f/u as scheduled [facility]  Outpatient Holter to be arranged  Eliquis as ordered: History of atrial fib  Check bun and creatinine, BMP one week  Bumex on hold  BMP one week  Renal follow-up Dr Ирина Teixeira  Ongoing wound care to lower extremities  Risk factor management / weight loss   Trend thyroid function  Observe for hypothyroidism  Follow-up with PCP in one week, Zelda Purcell MD    Physician Certification: I certify the above information and transfer of Steve Lees  is necessary for the continuing treatment of the diagnosis listed and that she requires [facility] for less 30 days. Update Admission H&P:   Principal Problem:    Bradycardia  Active Problems:    Acute kidney injury superimposed on chronic kidney disease (HCC)    Anticoagulated    Obesity (BMI 30-39. 9)    TSH elevation    Type 2 diabetes mellitus with stage 3 chronic kidney disease, without long-term current use of insulin (HCC)    Coronary artery disease involving native coronary artery of native heart without angina pectoris    Stage 3 chronic kidney disease (HCC)    Chronic diastolic CHF (congestive heart failure) ([facility])    COPD (chronic obstructive pulmonary disease) ([facility])    Paroxysmal atrial fibrillation ([facility] [address] [address]  Resolved Problems:    * No resolved hospital problems.  *     PHYSICIAN SIGNATURE:  Electronically signed by Veronica Gomez DO on 6/15/22 at 10:40 AM EDT

## 2022-06-16 ENCOUNTER — TELEPHONE (OUTPATIENT)
Dept: FAMILY MEDICINE CLINIC | Age: 72
End: 2022-06-16

## 2022-06-16 NOTE — TELEPHONE ENCOUNTER
----- Message from Mando Gutierrez sent at 6/16/2022  3:12 PM EDT -----  Subject: Appointment Request    Reason for Call: Urgent (Patient Request) Hospital Follow Up    QUESTIONS  Type of Appointment? Established Patient  Reason for appointment request? Available appointments did not meet   patient need  Additional Information for Provider? PT discharged 6/15, seen for fatigue,   disoriented, heart rate 29 in hospital. Daughter needs to speak with   office to work in an appt. Available times did not work for their schedule  ---------------------------------------------------------------------------  --------------  [facility] INFO  What is the best way for the office to contact you? OK to leave message on   voicemail  Preferred Call Back Phone Number? [phone]  ---------------------------------------------------------------------------  --------------  SCRIPT ANSWERS  Relationship to Patient? Self  (Patient requests to see provider urgently. )? Yes  (Has the patient been discharged from the hospital within 2 business days   AND does not have a Telephone Encounter  Follow Up From [address] [place] [address]   documented in [address]?)? No  Do you have any questions for your primary care provider that need to be   answered prior to your appointment? (Use RN Triage if question pertains to   anything on the red flag list)? No  (Patient needs follow up visit after hospital discharge) Book first   available appointment within 7 days OF DISCHARGE, if no appt, proceed to   book the next available time slot within 14 days OF DISCHARGE AND Send   Message to Provider. [address] [facility] Follow Up appointment cannot be booked   beyond 14 Days and should result in a Message to Provider. ? Yes   Have you been diagnosed with COVID-19 in the past 10 days? No  (Service Expert  click yes below to proceed with [employer] As Usual   Scheduling)?  Yes

## 2022-06-17 ENCOUNTER — TELEMEDICINE (OUTPATIENT)
Dept: FAMILY MEDICINE CLINIC | Age: 72
End: 2022-06-17
Payer: MEDICARE

## 2022-06-17 DIAGNOSIS — E44.1 MILD MALNUTRITION (HCC): ICD-10-CM

## 2022-06-17 DIAGNOSIS — L97.821 NON-PRESSURE CHRONIC ULCER OF OTHER PART OF LEFT LOWER LEG LIMITED TO BREAKDOWN OF SKIN (HCC): ICD-10-CM

## 2022-06-17 DIAGNOSIS — I50.32 CHRONIC DIASTOLIC CHF (CONGESTIVE HEART FAILURE) (HCC): Primary | ICD-10-CM

## 2022-06-17 DIAGNOSIS — I12.9 CKD STAGE 4 SECONDARY TO HYPERTENSION (HCC): ICD-10-CM

## 2022-06-17 DIAGNOSIS — J44.9 CHRONIC OBSTRUCTIVE PULMONARY DISEASE, UNSPECIFIED COPD TYPE (HCC): ICD-10-CM

## 2022-06-17 DIAGNOSIS — E55.9 VITAMIN D DEFICIENCY: ICD-10-CM

## 2022-06-17 DIAGNOSIS — I87.2 VENOUS INSUFFICIENCY OF BOTH LOWER EXTREMITIES: ICD-10-CM

## 2022-06-17 DIAGNOSIS — L97.811 NON-PRESSURE CHRONIC ULCER OF OTHER PART OF RIGHT LOWER LEG LIMITED TO BREAKDOWN OF SKIN (HCC): ICD-10-CM

## 2022-06-17 DIAGNOSIS — I48.0 PAROXYSMAL ATRIAL FIBRILLATION (HCC): ICD-10-CM

## 2022-06-17 DIAGNOSIS — N18.4 CKD STAGE 4 SECONDARY TO HYPERTENSION (HCC): ICD-10-CM

## 2022-06-17 DIAGNOSIS — Z09 HOSPITAL DISCHARGE FOLLOW-UP: ICD-10-CM

## 2022-06-17 PROBLEM — E83.42 HYPOMAGNESEMIA: Status: RESOLVED | Noted: 2021-05-26 | Resolved: 2022-06-17

## 2022-06-17 PROBLEM — Z79.01 ANTICOAGULATED: Status: RESOLVED | Noted: 2022-06-08 | Resolved: 2022-06-17

## 2022-06-17 PROBLEM — N18.9 ACUTE KIDNEY INJURY SUPERIMPOSED ON CHRONIC KIDNEY DISEASE (HCC): Status: RESOLVED | Noted: 2022-06-08 | Resolved: 2022-06-17

## 2022-06-17 PROBLEM — N17.9 AKI (ACUTE KIDNEY INJURY) (HCC): Status: RESOLVED | Noted: 2022-01-15 | Resolved: 2022-06-17

## 2022-06-17 PROBLEM — I50.43 ACUTE ON CHRONIC COMBINED SYSTOLIC (CONGESTIVE) AND DIASTOLIC (CONGESTIVE) HEART FAILURE (HCC): Status: RESOLVED | Noted: 2022-02-06 | Resolved: 2022-06-17

## 2022-06-17 PROBLEM — L03.115 CELLULITIS OF BOTH LOWER EXTREMITIES: Status: RESOLVED | Noted: 2021-05-26 | Resolved: 2022-06-17

## 2022-06-17 PROBLEM — N17.9 ACUTE KIDNEY INJURY SUPERIMPOSED ON CHRONIC KIDNEY DISEASE (HCC): Status: RESOLVED | Noted: 2022-06-08 | Resolved: 2022-06-17

## 2022-06-17 PROBLEM — L03.116 CELLULITIS OF BOTH LOWER EXTREMITIES: Status: RESOLVED | Noted: 2021-05-26 | Resolved: 2022-06-17

## 2022-06-17 PROBLEM — R63.4 UNINTENDED WEIGHT LOSS: Status: RESOLVED | Noted: 2022-05-01 | Resolved: 2022-06-17

## 2022-06-17 PROBLEM — R79.89 ELEVATED LFTS: Status: RESOLVED | Noted: 2021-02-25 | Resolved: 2022-06-17

## 2022-06-17 PROBLEM — R04.0 BLEEDING NOSE: Status: RESOLVED | Noted: 2022-05-01 | Resolved: 2022-06-17

## 2022-06-17 PROBLEM — R00.1 BRADYCARDIA: Status: RESOLVED | Noted: 2022-06-12 | Resolved: 2022-06-17

## 2022-06-17 PROBLEM — I16.1 HYPERTENSIVE EMERGENCY: Status: RESOLVED | Noted: 2020-07-25 | Resolved: 2022-06-17

## 2022-06-17 PROBLEM — I16.0 HYPERTENSIVE URGENCY: Status: RESOLVED | Noted: 2022-06-09 | Resolved: 2022-06-17

## 2022-06-17 PROCEDURE — 1124F ACP DISCUSS-NO DSCNMKR DOCD: CPT | Performed by: FAMILY MEDICINE

## 2022-06-17 PROCEDURE — 1111F DSCHRG MED/CURRENT MED MERGE: CPT | Performed by: FAMILY MEDICINE

## 2022-06-17 PROCEDURE — 99215 OFFICE O/P EST HI 40 MIN: CPT | Performed by: FAMILY MEDICINE

## 2022-06-17 PROCEDURE — 1090F PRES/ABSN URINE INCON ASSESS: CPT | Performed by: FAMILY MEDICINE

## 2022-06-17 PROCEDURE — G8400 PT W/DXA NO RESULTS DOC: HCPCS | Performed by: FAMILY MEDICINE

## 2022-06-17 PROCEDURE — G8427 DOCREV CUR MEDS BY ELIG CLIN: HCPCS | Performed by: FAMILY MEDICINE

## 2022-06-17 PROCEDURE — 3017F COLORECTAL CA SCREEN DOC REV: CPT | Performed by: FAMILY MEDICINE

## 2022-06-17 RX ORDER — ALBUTEROL SULFATE 90 UG/1
2 AEROSOL, METERED RESPIRATORY (INHALATION) EVERY 6 HOURS PRN
Qty: 18 G | Refills: 3 | Status: SHIPPED | OUTPATIENT
Start: 2022-06-17

## 2022-06-17 RX ORDER — HYDRALAZINE HYDROCHLORIDE 25 MG/1
25 TABLET, FILM COATED ORAL 3 TIMES DAILY
Qty: 270 TABLET | Refills: 3 | Status: ON HOLD | OUTPATIENT
Start: 2022-06-17 | End: 2022-07-11 | Stop reason: HOSPADM

## 2022-06-17 RX ORDER — ASPIRIN 81 MG/1
81 TABLET ORAL DAILY
Qty: 90 TABLET | Refills: 1 | Status: ON HOLD | OUTPATIENT
Start: 2022-06-17 | End: 2022-07-25 | Stop reason: HOSPADM

## 2022-06-17 RX ORDER — HYDRALAZINE HYDROCHLORIDE 25 MG/1
25 TABLET, FILM COATED ORAL 3 TIMES DAILY
Qty: 90 TABLET | Refills: 3 | Status: ON HOLD | OUTPATIENT
Start: 2022-06-17 | End: 2022-07-11 | Stop reason: HOSPADM

## 2022-06-17 RX ORDER — ASPIRIN 81 MG/1
81 TABLET ORAL DAILY
Qty: 90 TABLET | Refills: 3 | Status: ON HOLD | OUTPATIENT
Start: 2022-06-17 | End: 2022-07-11 | Stop reason: HOSPADM

## 2022-06-17 RX ORDER — ALBUTEROL SULFATE 90 UG/1
2 AEROSOL, METERED RESPIRATORY (INHALATION) EVERY 6 HOURS PRN
Qty: 8 G | Refills: 3 | Status: ON HOLD | OUTPATIENT
Start: 2022-06-17 | End: 2022-07-11 | Stop reason: HOSPADM

## 2022-06-17 ASSESSMENT — ENCOUNTER SYMPTOMS
WHEEZING: 0
ABDOMINAL DISTENTION: 0
CHEST TIGHTNESS: 0
DIARRHEA: 0
CONSTIPATION: 0
SHORTNESS OF BREATH: 1
VOMITING: 0
COUGH: 0
NAUSEA: 0
ABDOMINAL PAIN: 0

## 2022-06-17 NOTE — PATIENT INSTRUCTIONS
New Updates for [facility] MyChart/ [employer] ([facility]) SACHIN    Thank you for choosing US to give you the best care! [employer] ([facility]) is always trying to think of new ways to help their patients. We are asking all patients to try out the new digital registration that is now available through your [facility] account or the new SACHIN, [employer] ([facility]). Via the sachin you're now able to update your personal and registration information prior to your upcoming appointment. This will save you time once you arrive at the office to check-in, not to mention your information remains safe!! Many other perks come from signing up for an account, such as:   Requesting refills   Scheduling an appointment   Completing an [address] Sending a message to the office/provider   Having access to your medication list   Paying your bill/copay prior to your appointment   Scheduling your yearly mammogram   Review your test results    If you are not familiar with [facility] or the [facility] SACHIN, please ask one of us and we will be happy to answer any questions or help you set-up your account. Your [facility] office,  [facility]  Patient Education        Low Sodium Diet (2,000 Milligram): Care Instructions  Overview     Limiting sodium can be an important part of managing some health problems. The most common source of sodium is salt. People get most of the salt in their diet from canned, prepared, and packaged foods. Fast food and restaurant meals also are very high in sodium. Your doctor will probably limit your sodium to less than 2,000 milligrams (mg) a day. This limit counts all the sodium inprepared and packaged foods and any salt you add to your food. Follow-up care is a key part of your treatment and safety. Be sure to make and go to all appointments, and call your doctor if you are having problems. It's also a good idea to know your test results and keep alist of the medicines you take.   How can you care for yourself at home? Read food labels   Read labels on cans and food packages. The labels tell you how much sodium is in each serving. Make sure that you look at the serving size. If you eat more than the serving size, you have eaten more sodium.  Food labels also tell you the Percent Daily Value for sodium. Choose products with low Percent Daily Values for sodium.  Be aware that sodium can come in forms other than salt, including monosodium glutamate (MSG), sodium citrate, and sodium bicarbonate (baking soda). MSG is often added to Asian food. When you eat out, you can sometimes ask for food without MSG or added salt. Buy low-sodium foods   Buy foods that are labeled \"unsalted\" (no salt added), \"sodium-free\" (less than 5 mg of sodium per serving), or \"low-sodium\" (140 mg or less of sodium per serving). Foods labeled \"reduced-sodium\" and \"light sodium\" may still have too much sodium. Be sure to read the label to see how much sodium you are getting.  Buy fresh vegetables, or frozen vegetables without added sauces. Buy low-sodium versions of canned vegetables, soups, and other canned goods. Prepare low-sodium meals   Cut back on the amount of salt you use in cooking. This will help you adjust to the taste. Do not add salt after cooking. One teaspoon of salt has about 2,300 mg of sodium.  Take the salt shaker off the table.  Flavor your food with garlic, lemon juice, onion, vinegar, herbs, and spices. Do not use soy sauce, lite soy sauce, steak sauce, onion salt, garlic salt, celery salt, or ketchup on your food.  Use low-sodium salad dressings, sauces, and ketchup. Or make your own salad dressings and sauces without adding salt.  Use less salt (or none) when recipes call for it. You can often use half the salt a recipe calls for without losing flavor. Other foods such as rice, pasta, and grains do not need added salt.  Rinse canned vegetables, and cook them in fresh water.  This removes some--but not all--of the salt.  Avoid water that is naturally high in sodium or that has been treated with water softeners, which add sodium. If you buy bottled water, read the label and choose a sodium-free brand. Avoid high-sodium foods   Avoid eating:  ? Smoked, cured, salted, and canned meat, fish, and poultry. ? Ham, de león, hot dogs, and luncheon meats. ? Regular, hard, and processed cheese and regular peanut butter. ? Crackers with salted tops, and other salted snack foods such as pretzels, chips, and salted popcorn. ? Frozen prepared meals, unless labeled low-sodium. ? Canned and dried soups, broths, and bouillon, unless labeled sodium-free or low-sodium. ? Canned vegetables, unless labeled sodium-free or low-sodium. ? [place] fries, pizza, tacos, and other fast foods. ? Pickles, olives, ketchup, and other condiments, especially soy sauce, unless labeled sodium-free or low-sodium. Where can you learn more? Go to https://[employer].Ufree. org and sign in to your [employer] account. Enter I174 in the [facility] box to learn more about \"Low Sodium Diet (2,000 Milligram): Care Instructions. \"     If you do not have an account, please click on the \"Sign Up Now\" link. Current as of: September 8, 2021               Content Version: 13.2  © 2006-2022 Healthwise, Incorporated. Care instructions adapted under license by [facility] ([facility]). If you have questions about a medical condition or this instruction, always ask your healthcare professional. [place] [address] any warranty or liability for your use of this information.

## 2022-06-17 NOTE — PROGRESS NOTES
Visit Information    Have you changed or started any medications since your last visit including any over-the-counter medicines, vitamins, or herbal medicines? yes - adjusted hydralazine and metoprolol   Have you stopped taking any of your medications? Is so, why? -  yes - not sure  Are you having any side effects from any of your medications? - no    Have you seen any other physician or provider since your last visit? yes -   Have you had any other diagnostic tests since your last visit? yes -   Have you been seen in the emergency room and/or had an admission in a hospital since we last saw you?  yes -    Have you had your routine dental cleaning in the past 6 months?  no     Do you have an active MyChart account? If no, what is the barrier?   Yes    Patient Care Team:  Keshia Cardona MD as PCP - General (Family Medicine)  Keshia Cardona MD as PCP - [facility]  Neena Patton MD as Consulting Physician (Rheumatology)  Radha Tse MD as Surgeon (Cardiology)  Luz Leroy MD as Consulting Physician (Neurology)  Aureliano Colorado MD as Consulting Physician (Nephrology)  Niecy Ambriz MD as Consulting Physician (Urology)  Trupti Penaloza MD as Surgeon (Ophthalmology)  Macy Sears MD as Consulting Physician (Gastroenterology)  Shae Graf MD as Consulting Physician (Hematology and Oncology)    Medical History Review  Past Medical, Family, and Social History reviewed and does contribute to the patient presenting condition    Health Maintenance   Topic Date Due    COVID-19 Vaccine (1) Never done    Pneumococcal 65+ years Vaccine (1 - PCV) Never done    DTaP/Tdap/Td vaccine (1 - Tdap) Never done    Shingles vaccine (1 of 2) Never done    DEXA (modify frequency per FRAX score)  06/01/2017    Diabetic retinal exam  10/19/2021    Breast cancer screen  08/28/2022    Flu vaccine (Season Ended) 09/01/2022    Diabetic foot exam  09/22/2022    Annual

## 2022-06-17 NOTE — PROGRESS NOTES
TELEHEALTH EVALUATION -- Audio/Visual (During TKIYC-11 public health emergency) using [facility]     Post-Discharge Transitional Care Follow Up      [address]   YOB: 1950    Date of Office Visit:  6/17/2022  Date of Hospital Admission: 6/13/22  Date of Hospital Discharge: 6/15/22  Readmission Risk Score(high >=14%. Medium >=10%):Readmission Risk Score: 21.1 ( )      Care management risk score Rising risk (score 2-5) and Complex Care (Scores >=6): 4     Non face to face  following discharge, date last encounter closed (first attempt may have been earlier): *No documented post hospital discharge outreach found in the last 14 days     Call initiated 2 business days of discharge: *No response recorded in the last 14 days     Below is the assessment and plan developed based on review of pertinent history, physical exam, labs, studies, and medications. Chronic diastolic CHF (congestive heart failure) ([facility] [address]  Improved  I tried to convince the patient to accept home care, she absolutely declines  Discussed low salt diet and BP and pulse monitoring.    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 (HCC)    Stable, ongoing, currently with Unna boot applied at the discharge from hospital  High-protein diet discussed, chicken, dairy products, peanut butter, protein shakes like Glucerna or Splenda  Patient strongly advised to call on Monday for an appointment with wound care  If the [facility] boot feels too tight, the daughter was advised to take her to the emergency room over the weekend to have them checked and reapplied    -     [address] [facility]  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 (HCC)    Stable, ongoing, currently with Unna boot  High-protein diet discussed, chicken, dairy products, peanut butter, protein shakes like Glucerna or Splenda  Patient strongly advised to call on Monday for an appointment if the [address] feels too tight, to take her over the weekend to the emergency room to have them checked and reapplied    -     [place] DISCHARGE MEDS RECONCILED W/ CURRENT OUTPATIENT MED LIST  -     [address] [facility]  CKD stage 4 secondary to hypertension ([facility] [address]  Worsening chronic kidney disease, has appointment with nephrologist    BP Readings from Last 3 Encounters:   06/15/22 (!) 168/80   06/13/22 (!) 148/50   06/11/22 136/60   Fluctuating blood pressure, she will check her blood pressure at home, and she will let me know the readings    -     [place] DISCHARGE MEDS RECONCILED W/ CURRENT OUTPATIENT MED LIST  -     metoprolol tartrate (LOPRESSOR) 25 MG tablet; Take 1 tablet by mouth 2 times daily, Disp-60 tablet, R-3Normal  -     hydrALAZINE (APRESOLINE) 25 MG tablet; Take 1 tablet by mouth 3 times daily, Disp-90 tablet, R-3Normal  -     Basic Metabolic Panel; Future  -     Magnesium; Future  -     Phosphorus; Future    Discussed low salt diet and BP and pulse monitoring. Chronic obstructive pulmonary disease, unspecified COPD type (HCC)  Worsening  -     [place] DISCHARGE MEDS RECONCILED W/ CURRENT OUTPATIENT MED LIST  -     albuterol sulfate HFA (PROAIR HFA) 108 (90 Base) MCG/ACT inhaler;  Inhale 2 puffs into the lungs every 6 hours as needed for Wheezing or Shortness of Breath (cough), Disp-8 g, R-3Normal  -To start    fluticasone-salmeterol (ADVAIR HFA) 115-21 MCG/ACT inhaler; Inhale 2 puffs into the lungs 2 times daily, Disp-12 g, R-3Normal  Venous insufficiency of both lower extremities  Improved  Currently having Unna boots  -     [place] DISCHARGE MEDS RECONCILED W/ CURRENT OUTPATIENT MED LIST  -     [address]  Mild malnutrition ([address]  Worsening  Increase proteins in diet, chicken, dairy products, peanut butter, protein shakes  -     [place] DISCHARGE MEDS RECONCILED W/ CURRENT OUTPATIENT MED LIST  -     Prealbumin; Future  Paroxysmal atrial fibrillation (HCC)  Stable  On chronic anticoagulation with apixaban, continue metoprolol and amiodarone  Follow-up with cardiology as scheduled  -     [place] DISCHARGE MEDS RECONCILED W/ CURRENT OUTPATIENT MED LIST  -     [address]  -     metoprolol tartrate (LOPRESSOR) 25 MG tablet; Take 1 tablet by mouth 2 times daily, Disp-60 tablet, R-3Normal  -     aspirin EC 81 MG EC tablet; Take 1 tablet by mouth daily, Disp-90 tablet, R-1Normal  -     hydrALAZINE (APRESOLINE) 25 MG tablet; Take 1 tablet by mouth 3 times daily, Disp-90 tablet, R-3Normal  Vitamin D deficiency  Unsure if improving or not. Will recheck level  Continue supplementation.    -     [place] DISCHARGE MEDS RECONCILED W/ CURRENT OUTPATIENT MED LIST  -     Vitamin D 25 Hydroxy; Future  Hospital discharge follow-up  -     [place] DISCHARGE MEDS RECONCILED W/ CURRENT OUTPATIENT MED LIST      Medical Decision Making: high complexity  Return for KEEP APPT. On this date 6/17/2022 I have spent 45 minutes reviewing previous notes, test results and face to face with the patient discussing the diagnosis and importance of compliance with the treatment plan as well as documenting on the day of the visit and care coordination with the daughter.      Medication sent to 2 pharmacies, local pharmacy for 30 days and mail in order for 90 days    Future Appointments   Date Time Provider Angela Reyez   6/28/2022  4:00 PM Eva Oliva MD [facility]   7/14/2022  3:30 PM Kalee Helms MD [facility]   8/4/2022  2:30 PM MD mary Christine sc [facility]   11/25/2022  3:30 PM MD mary Christine sc [facility]       Subjective:   HPI    Inpatient course: Discharge summary reviewed- see chart. Interval history/Current status: Improved    Patient was admitted 6/13/2022 through 6/15/2022 at [facility] due to severe bradycardia down to 29, worsening fatigue, metoprolol was decreased. Cardiology was consulted, no pacemaker was placed it was felt it was due to increasing metoprolol before prior discharge. Acute on chronic kidney disease stage III now currently in stage IV. She was treated with Bumex increased dosage. Patient was discharged 2 days ago, without Bumex at discharge      This is actually readmission, with most recent admission prior to this on 6/7/2022 through 6/11/2022 for acute on chronic kidney disease. During this virtual visit today, she comes with her daughter, Marie Siegel. Patient says she feels a bit tired, but otherwise she has been feeling better. Her daughter says they have gotten pulse oximeter to self monitor and her oxygen has been 96%  We discussed due to admission back-to-back, to start home care, but patient declines home care. Her daughter, says that herself, Marie Siegel and brother are taking care of her. Insurance didn't cover for SNF, therefore she was discharged home. The patient says \"I don't want them to come to me. \"  The daughter says \"I cannot argue with you. \"    The daughter says before the discharge, she was placed in unna boots but she thinks they might be too tight and the patient has been complaining that they feel uncomfortable and tight.   Advised that if symptoms get worse to go to the emergency room over  COPD (chronic obstructive pulmonary disease) (HCC)    HTN. Benign hypertension with CKD (chronic kidney disease) stage III (HCC)    Gout with tophi    Hyperlipidemia with target LDL less than 70    Dry skin dermatitis HANDS    Meningioma, cerebral (HCC)    Paroxysmal atrial fibrillation (HCC)    Influenza vaccination declined    Recurrent UTI    Kidney stones    Diverticulosis    COVID-19 vaccination declined    Chronic venous stasis dermatitis of both lower extremities    Symptomatic anemia    Family history of colon cancer    Family history of pancreatic cancer    Mild malnutrition (HCC)    Decreased strength, endurance, and mobility    Bilateral lower extremity edema    Non-pressure chronic ulcer of other part of left lower leg limited to breakdown of skin (HCC)    Bleeding nose    Unintended weight loss    Lymphedema    Peripheral vascular disease, unspecified (HCC)    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 (HCC)    Obesity (BMI 30-39. 9)    TSH elevation       Medications listed as started at the time of discharge from hospital    Medications copied from discharge summary  Medication List             START taking these medications          aspirin 81 MG EC tablet  Take 1 tablet by mouth daily  Start taking on: June 16, 2022                      CHANGE how you take these medications          metoprolol tartrate 25 MG tablet  Commonly known as: LOPRESSOR  Take 0.5 tablets by mouth 2 times daily  What changed:   · medication strength  · how much to take                      CONTINUE taking these medications          Adjust Fold Cane/York Handle Misc  Use daily for walking      * albuterol (2.5 MG/3ML) 0.083% nebulizer solution  Commonly known as: PROVENTIL  Take 3 mLs by nebulization every 6 hours as needed for Wheezing or Shortness of Breath      * albuterol sulfate  (90 Base) MCG/ACT inhaler  Commonly known as: ProAir HFA  Inhale 2 puffs into the lungs every 6 hours as needed for Wheezing or Shortness of Breath (cough)      amiodarone 200 MG tablet  Commonly known as: CORDARONE  Take 1 tablet by mouth daily      amLODIPine 10 MG tablet  Commonly known as: NORVASC  Take 1 tablet by mouth nightly      apixaban 2.5 MG Tabs tablet  Commonly known as: ELIQUIS  Take 1 tablet by mouth 2 times daily      atorvastatin 40 MG tablet  Commonly known as: LIPITOR  Take 1 tablet by mouth once daily      blood glucose monitor kit and supplies  1 kit by Other route three times daily Dispense Butterfly Elite CBG Device      * Blood Pressure Kit  1 kit by Does not apply route three times daily      * Blood Pressure Kit  Diagnosis: HTN. Needs to check blood pressure 1-2 times a day until stable, then once a day. Goal blood pressure less than 135/85, and above 110/60.      Compressor/Nebulizer Misc  Nebulizer with compressor. Disposable Med Nebs 2 /month. Reusable Med Nebs 1 per 6 months. Aerosol Mask 1 per month. Replacement Filters 2 per month. All other related supplies as needed per month. Medications being used: Albuterol . 083 unit dose vial. Frequency: every 6 hrs x 4/day . Length of Need: 1      desloratadine 5 MG tablet  Commonly known as: CLARINEX  Take 1 tablet by mouth once daily      ferrous sulfate 325 (65 Fe) MG tablet  Commonly known as: IRON 325  Take 1 tablet by mouth daily (with breakfast)      FreeStyle Lancets Misc  1 each by Does not apply route daily Patient needs to contact office before any further refills will be approved      FREESTYLE LITE strip  Generic drug: blood glucose test strips  1 each by In Vitro route daily As needed.      Handicap Placard Misc  by Does not apply route Expires on 12/30/25      hydrALAZINE 25 MG tablet  Commonly known as: APRESOLINE  Take 3 tablets by mouth 3 times daily      magnesium oxide 400 (241.3 Mg) MG Tabs tablet  Commonly known as: MAG-OX  Take 1 tablet by mouth twice daily      miconazole 2 % powder  Commonly known as: MICOTIN  Apply topically 2 times daily UNDER THE SKIN FOLDS LONG TERM      saline nasal gel Gel  by Nasal route as needed for Congestion For nose bleeds, 2-3 times a day intranasal prn      therapeutic multivitamin-minerals tablet  Take 1 tablet by mouth daily      vitamin D 1.25 MG (58149 UT) Caps capsule  Commonly known as: ERGOCALCIFEROL  Take 1 capsule by mouth once a week Sundays           * This list has 4 medication(s) that are the same as other medications prescribed for you. Read the directions carefully, and ask your doctor or other care provider to review them with you.                          STOP taking these medications          bumetanide 0.5 MG tablet  Commonly known as: BUMEX         Medications marked \"taking\" at this time  Outpatient Medications Marked as Taking for the 6/17/22 encounter (Telemedicine) with Juan David Walter MD   Medication Sig Dispense Refill    aspirin 81 MG EC tablet Take 1 tablet by mouth daily 30 tablet 3    hydrALAZINE (APRESOLINE) 25 MG tablet Take 3 tablets by mouth 3 times daily 90 tablet 3    metoprolol tartrate (LOPRESSOR) 25 MG tablet Take 0.5 tablets by mouth 2 times daily 60 tablet 3    amLODIPine (NORVASC) 10 MG tablet Take 1 tablet by mouth nightly 30 tablet 3    Blood Pressure KIT Diagnosis: HTN. Needs to check blood pressure 1-2 times a day until stable, then once a day.  Goal blood pressure less than 135/85, and above 110/60. 1 kit 0    vitamin D (ERGOCALCIFEROL) 1.25 MG (73870 UT) CAPS capsule Take 1 capsule by mouth once a week Sundays 12 capsule 0    amiodarone (CORDARONE) 200 MG tablet Take 1 tablet by mouth daily 90 tablet 0    ferrous sulfate (IRON 325) 325 (65 Fe) MG tablet Take 1 tablet by mouth daily (with breakfast) 180 tablet 1    apixaban (ELIQUIS) 2.5 MG TABS tablet Take 1 tablet by mouth 2 times daily 60 tablet 0    albuterol sulfate HFA (PROAIR HFA) 108 (90 Base) MCG/ACT inhaler Inhale 2 puffs into the lungs every 6 hours as needed for Wheezing or Shortness of Breath (cough) 8 g 3    Misc. Devices (ADJUST FOLD CANE/YORK HANDLE) MISC Use daily for walking 1 each 0    Handicap Placard MISC by Does not apply route Expires on 12/30/25 1 each 0    atorvastatin (LIPITOR) 40 MG tablet Take 1 tablet by mouth once daily 90 tablet 3    desloratadine (CLARINEX) 5 MG tablet Take 1 tablet by mouth once daily 90 tablet 3    FreeStyle Lancets MISC 1 each by Does not apply route daily Patient needs to contact office before any further refills will be approved 90 each 3    magnesium oxide (MAG-OX) 400 (241.3 Mg) MG TABS tablet Take 1 tablet by mouth twice daily 180 tablet 3    miconazole (MICOTIN) 2 % powder Apply topically 2 times daily UNDER THE SKIN FOLDS LONG TERM 45 g 1    Multiple Vitamins-Minerals (THERAPEUTIC MULTIVITAMIN-MINERALS) tablet Take 1 tablet by mouth daily 90 tablet 3    blood glucose test strips (FREESTYLE LITE) strip 1 each by In Vitro route daily As needed. 100 each 3    blood glucose monitor kit and supplies 1 kit by Other route three times daily Dispense Butterfly Elite CBG Device 1 kit 0        Medications patient taking as of now reconciled against medications ordered at time of hospital discharge: Yes    Review of Systems   Constitutional: Positive for fatigue and unexpected weight change (fluctuating). Negative for activity change, appetite change, chills, diaphoresis and fever. HENT: Positive for hearing loss (she is very hard of hearing). Respiratory: Positive for shortness of breath (FLORES). Negative for cough, chest tightness and wheezing. Cardiovascular: Positive for leg swelling. Negative for chest pain and palpitations. Gastrointestinal: Negative for abdominal distention, abdominal pain, constipation, diarrhea, nausea and vomiting. Endocrine: Positive for cold intolerance. Negative for heat intolerance, polydipsia, polyphagia and polyuria.    Genitourinary: Positive for frequency (from Bumex) and urgency. Musculoskeletal: Positive for gait problem. Ambulates with walker or wheelchair. Skin: Positive for rash (legs) and wound (legs). Allergic/Immunologic: Positive for immunocompromised state (due to age, diabetes, CHF, COPD). Hematological: Bruises/bleeds easily. Psychiatric/Behavioral: Positive for sleep disturbance (sleeping more). The patient is nervous/anxious. Objective:      PHYSICAL EXAMINATION:    Vital Signs: (As obtained by patient/caregiver or practitioner observation)  -vital signs stable and within normal limits except obesity per BMI, BMI 30.77 kg/M2  Patient-Reported Vitals 6/17/2022   Patient-Reported Weight 152#   Patient-Reported Height 4'11   Patient-Reported Temperature -   Patient-Reported SpO2 96% at rest and room air             Constitutional: [x] Appears well-developed and well-nourished [x] No apparent distress      [x] Abnormal-slightly obese, patient does have kyphosis, her BMI might be falsely high      Mental status  [x] Alert and awake  [x] Oriented to person/place/time [x]Able to follow commands      Eyes:  EOM    [x]  Normal  [] Abnormal-  Sclera  [x]  Normal  [] Abnormal -         Discharge [x]  None visible  [] Abnormal -    HENT:   [x] Normocephalic, atraumatic.   [] Abnormal   [x] Mouth/Throat: Mucous membranes are moist.     External Ears [x] Normal  [] Abnormal-     Neck: [x] No visualized mass     Pulmonary/Chest: [x] Respiratory effort normal.  [x] No visualized signs of difficulty breathing or respiratory distress        [] Abnormal        Musculoskeletal:   [] Normal gait with no signs of ataxia         [x] Normal range of motion of neck        [x] Abnormal-sitting, she usually ambulates with a walker      Neurological:        [x] No Facial Asymmetry (Cranial nerve 7 motor function) (limited exam to video visit)          [x] No gaze palsy        [] Abnormal-            Skin:        [x] No significant exanthematous lesions or discoloration noted on facial skin         [x] Abnormal-pictures from the admission noted, bilateral legs with extensive superficial wounds, with surrounding erythema, I am unable to appreciate the size           Psychiatric:      [x] No Hallucinations       []Mood is normal      [x]Behavior is normal      [x]Judgment is normal      [x]Thought content is normal       [x] Abnormal-looks anxious    Other pertinent observable physical exam findings-hard of hearing    Due to this being a TeleHealth encounter, evaluation of the following organ systems is limited: Vitals/Constitutional/EENT/Resp/CV/GI//MS/Neuro/Skin/Heme-Lymph-Imm. I personally reviewed most recent labs reviewed with the patient and all questions fully answered.    Mild anemia  Hyperglycemia well controlled  Chronic kidney disease stage IV    Vitamin D deficiency  Otherwise labs within normal limits    Lab Results   Component Value Date    WBC 9.7 06/14/2022    HGB 11.2 (L) 06/14/2022    HCT 36.4 06/14/2022    MCV 99.7 06/14/2022     06/14/2022       Lab Results   Component Value Date     06/15/2022    K 4.5 06/15/2022     06/15/2022    CO2 20 06/15/2022    BUN 69 06/15/2022    CREATININE 2.34 06/15/2022    GLUCOSE 93 06/15/2022    CALCIUM 8.5 06/15/2022        Lab Results   Component Value Date    ALT 17 06/15/2022    AST 20 06/15/2022    ALKPHOS 136 (H) 06/15/2022    BILITOT 0.24 (L) 06/15/2022       Lab Results   Component Value Date    TSH 5.51 (H) 06/12/2022       Lab Results   Component Value Date    CHOL 135 12/06/2021    CHOL 117 07/27/2020    CHOL 188 03/11/2020     Lab Results   Component Value Date    TRIG 132 12/06/2021    TRIG 112 07/27/2020    TRIG 279 (H) 03/11/2020     Lab Results   Component Value Date    HDL 59 12/06/2021    HDL 48 07/27/2020    HDL 55 03/11/2020     Lab Results   Component Value Date    LDLCHOLESTEROL 50 12/06/2021    LDLCHOLESTEROL 47 07/27/2020    LDLCHOLESTEROL 77 03/11/2020 Lab Results   Component Value Date    CHOLHDLRATIO 2.3 12/06/2021    CHOLHDLRATIO 2.4 07/27/2020    CHOLHDLRATIO 3.4 03/11/2020       Lab Results   Component Value Date    LABA1C 4.6 03/31/2022       Lab Results   Component Value Date    RIYBQRFL20 1927 (H) 06/01/2022       Lab Results   Component Value Date    FOLATE >20.0 06/01/2022       Lab Results   Component Value Date    IRON 20 (L) 03/01/2022    TIBC 419 03/01/2022    FERRITIN 13 03/01/2022       Lab Results   Component Value Date    VITD25 19.0 (L) 03/18/2021         Ami Danieldeedee Ojeda, was evaluated through a synchronous (real-time) audio-video encounter. The patient (or guardian if applicable) is aware that this is a billable service, which includes applicable co-pays. This Virtual Visit was conducted with patient's (and/or legal guardian's) consent. The visit was conducted pursuant to the emergency declaration under the [address] [place] [address] authority and the [employer] and [employer] General Act. Patient identification was verified, and a caregiver, her daughter Iam Olson, was present. The patient was located at Home: [place] [address]. Provider was located at [facility] (Appt Dept): [place] [address]  [address] [address].     Electronically signed by Keshia Cardona MD on 6/17/22 at 7:46 PM EDT

## 2022-06-21 ENCOUNTER — HOSPITAL ENCOUNTER (OUTPATIENT)
Dept: WOUND CARE | Age: 72
Discharge: HOME | End: 2022-06-21
Payer: MEDICARE

## 2022-06-21 VITALS
HEIGHT: 59 IN | RESPIRATION RATE: 16 BRPM | WEIGHT: 152 LBS | BODY MASS INDEX: 30.64 KG/M2 | TEMPERATURE: 96.1 F | HEART RATE: 64 BPM | SYSTOLIC BLOOD PRESSURE: 138 MMHG | DIASTOLIC BLOOD PRESSURE: 54 MMHG

## 2022-06-21 DIAGNOSIS — R60.0 BILATERAL LOWER EXTREMITY EDEMA: ICD-10-CM

## 2022-06-21 DIAGNOSIS — I89.0 LYMPHEDEMA: ICD-10-CM

## 2022-06-21 DIAGNOSIS — I87.2 VENOUS INSUFFICIENCY OF BOTH LOWER EXTREMITIES: ICD-10-CM

## 2022-06-21 DIAGNOSIS — L97.811 NON-PRESSURE CHRONIC ULCER OF OTHER PART OF RIGHT LOWER LEG LIMITED TO BREAKDOWN OF SKIN (HCC): Primary | ICD-10-CM

## 2022-06-21 DIAGNOSIS — L97.821 NON-PRESSURE CHRONIC ULCER OF OTHER PART OF LEFT LOWER LEG LIMITED TO BREAKDOWN OF SKIN (HCC): ICD-10-CM

## 2022-06-21 DIAGNOSIS — L97.922 CHRONIC ULCER OF LEFT LEG WITH FAT LAYER EXPOSED (HCC): ICD-10-CM

## 2022-06-21 PROCEDURE — 99213 OFFICE O/P EST LOW 20 MIN: CPT | Performed by: INTERNAL MEDICINE

## 2022-06-21 PROCEDURE — 99213 OFFICE O/P EST LOW 20 MIN: CPT

## 2022-06-21 RX ORDER — LIDOCAINE HYDROCHLORIDE 40 MG/ML
SOLUTION TOPICAL ONCE
Status: CANCELLED | OUTPATIENT
Start: 2022-06-21 | End: 2022-06-21

## 2022-06-21 RX ORDER — LIDOCAINE HYDROCHLORIDE 20 MG/ML
JELLY TOPICAL ONCE
Status: CANCELLED | OUTPATIENT
Start: 2022-06-21 | End: 2022-06-21

## 2022-06-21 ASSESSMENT — PAIN SCALES - GENERAL: PAINLEVEL_OUTOF10: 0

## 2022-06-21 NOTE — PROGRESS NOTES
[address]   Progress Note and Procedure Note      Stalin Ojeda  MEDICAL RECORD NUMBER:  [id number]  AGE: 66 y.o. GENDER: female  : 1950  EPISODE DATE:  2022    Subjective:     Chief Complaint   Patient presents with    Wound Check     bilateral lower legs         HISTORY of PRESENT ILLNESS HPI     Avni Hathaway is a 66 y.o. female who presents today for wound/ulcer evaluation. History of Wound Context: here to follow up on left lower leg wounds that was healed. The patient was hospitalized recently, Unna boot was placed upon discharge, removed today. Lower extremity swelling improved, currently on Bumex.   She denied fever or chills, no nausea or vomiting, no other complaint  Modifying Factors: None  Associated Signs/Symptoms: Edema    Ulcer Identification:  Ulcer Type: venous  Contributing Factors: edema, venous stasis, lymphedema and decreased mobility    Wound: N/A        PAST MEDICAL HISTORY        Diagnosis Date    Acute CVA (cerebrovascular accident) ([address] 2020    Acute kidney injury superimposed on chronic kidney disease ([address] 2022    Acute on chronic combined systolic (congestive) and diastolic (congestive) heart failure ([address] 2022    JOY (acute kidney injury) ([address] 1/15/2022    Altered mental status 2021    Anticoagulated 2022    Arthritis     Asthma     Bradycardia 2022    Brain mass     Cellulitis and abscess     Cellulitis and abscess of unspecified site     Cellulitis of both lower extremities 2021    Cellulitis of left lower extremity 2020    Cellulitis of lower extremity 10/4/2020    CHF (congestive heart failure) (HCC)     Chronic kidney disease, unspecified     CKD stage 4 secondary to hypertension ([address] 2020    Coronary atherosclerosis of native coronary artery     Elevated LFTs 2021    Essential hypertension 2020    Essential hypertension, malignant     Hallucinations 2021    Hard of hearing     Head contusion 2020    Hypertensive emergency 2020    Hypertensive urgency with nyla 2022    Hypokalemia 2020    Hypomagnesemia 2021    Iron deficiency anemia, unspecified     Meningioma ([facility] 2021    Myocardial infarction ([facility]     Other and unspecified hyperlipidemia     Pure hypercholesterolemia     Stage 4 chronic kidney disease ([facility]     Toxic metabolic encephalopathy     Type 2 diabetes mellitus with stage 4 chronic kidney disease, without long-term current use of insulin (HCC)     Type II or unspecified type diabetes mellitus without mention of complication, not stated as uncontrolled     Unspecified asthma(493.90)     Unspecified venous (peripheral) insufficiency     Unspecified vitamin D deficiency     UTI (urinary tract infection) 2021       PAST SURGICAL HISTORY    Past Surgical History:   Procedure Laterality Date    COLONOSCOPY N/A 3/15/2022    COLONOSCOPY DIAGNOSTIC performed by Guera Hui MD at [facility].      x 3, Dr. Neha Verdugo  3/7/2022         THORACENTESIS  3/10/2022         TONSILLECTOMY AND ADENOIDECTOMY      UPPER GASTROINTESTINAL ENDOSCOPY N/A 3/15/2022    EGD BIOPSY performed by Guera Hui MD at [address] [place] [address]    Family History   Problem Relation Age of Onset    Diabetes Mother     Heart Disease Mother     Heart Disease Father     Diabetes Sister     High Blood Pressure Sister     Diabetes Maternal Grandmother        SOCIAL HISTORY    Social History     Tobacco Use    Smoking status: Former Smoker     Packs/day: 0.25     Years: 10.00     Pack years: 2.50     Quit date: 2000     Years since quittin.4    Smokeless tobacco: Never Used   Vaping Use    Vaping Use: Never used   Substance Use Topics    Alcohol use: Not Currently     Alcohol/week: 0.0 standard drinks    Drug use:  No ALLERGIES    Allergies   Allergen Reactions    Latex Rash    Aleve [Naproxen Sodium]      Chronic kidney disease stage III, CHF    Pioglitazone Other (See Comments)     Congestive heart failure    Claritin [Loratadine]     Keflex [Cephalexin]     Lisinopril      Needs clarification of contraindication       MEDICATIONS    Current Outpatient Medications on File Prior to Encounter   Medication Sig Dispense Refill    metoprolol tartrate (LOPRESSOR) 25 MG tablet Take 1 tablet by mouth 2 times daily 60 tablet 3    aspirin EC 81 MG EC tablet Take 1 tablet by mouth daily 90 tablet 1    hydrALAZINE (APRESOLINE) 25 MG tablet Take 1 tablet by mouth 3 times daily 90 tablet 3    albuterol sulfate HFA (PROAIR HFA) 108 (90 Base) MCG/ACT inhaler Inhale 2 puffs into the lungs every 6 hours as needed for Wheezing or Shortness of Breath (cough) 8 g 3    fluticasone-salmeterol (ADVAIR HFA) 115-21 MCG/ACT inhaler Inhale 2 puffs into the lungs 2 times daily 12 g 3    metoprolol tartrate (LOPRESSOR) 25 MG tablet Take 1 tablet by mouth 2 times daily 180 tablet 3    aspirin EC 81 MG EC tablet Take 1 tablet by mouth daily 90 tablet 3    hydrALAZINE (APRESOLINE) 25 MG tablet Take 1 tablet by mouth 3 times daily 270 tablet 3    albuterol sulfate HFA (PROAIR HFA) 108 (90 Base) MCG/ACT inhaler Inhale 2 puffs into the lungs every 6 hours as needed for Wheezing or Shortness of Breath (cough) 18 g 3    fluticasone-salmeterol (ADVAIR HFA) 115-21 MCG/ACT inhaler Inhale 2 puffs into the lungs 2 times daily Maintenance inhaler 36 g 3    amLODIPine (NORVASC) 10 MG tablet Take 1 tablet by mouth nightly 30 tablet 3    Blood Pressure KIT Diagnosis: HTN. Needs to check blood pressure 1-2 times a day until stable, then once a day.  Goal blood pressure less than 135/85, and above 110/60. 1 kit 0    vitamin D (ERGOCALCIFEROL) 1.25 MG (43237 UT) CAPS capsule Take 1 capsule by mouth once a week Sundays 12 capsule 0    amiodarone (CORDARONE) 200 MG tablet Take 1 tablet by mouth daily 90 tablet 0    ferrous sulfate (IRON 325) 325 (65 Fe) MG tablet Take 1 tablet by mouth daily (with breakfast) 180 tablet 1    apixaban (ELIQUIS) 2.5 MG TABS tablet Take 1 tablet by mouth 2 times daily 60 tablet 0    Nebulizers (COMPRESSOR/NEBULIZER) MISC Nebulizer with compressor. Disposable Med Nebs 2 /month. Reusable Med Nebs 1 per 6 months. Aerosol Mask 1 per month. Replacement Filters 2 per month. All other related supplies as needed per month. Medications being used: Albuterol . 083 unit dose vial. Frequency: every 6 hrs x 4/day . Length of Need: 1 (Patient not taking: Reported on 6/17/2022) 1 each 3    albuterol (PROVENTIL) (2.5 MG/3ML) 0.083% nebulizer solution Take 3 mLs by nebulization every 6 hours as needed for Wheezing or Shortness of Breath (Patient not taking: Reported on 6/17/2022) 125 each 0    Misc. Devices (ADJUST FOLD CANE/YORK HANDLE) MISC Use daily for walking 1 each 0    Handicap Placard MISC by Does not apply route Expires on 12/30/25 1 each 0    atorvastatin (LIPITOR) 40 MG tablet Take 1 tablet by mouth once daily 90 tablet 3    desloratadine (CLARINEX) 5 MG tablet Take 1 tablet by mouth once daily 90 tablet 3    FreeStyle Lancets MISC 1 each by Does not apply route daily Patient needs to contact office before any further refills will be approved 90 each 3    magnesium oxide (MAG-OX) 400 (241.3 Mg) MG TABS tablet Take 1 tablet by mouth twice daily 180 tablet 3    miconazole (MICOTIN) 2 % powder Apply topically 2 times daily UNDER THE SKIN FOLDS LONG TERM 45 g 1    Multiple Vitamins-Minerals (THERAPEUTIC MULTIVITAMIN-MINERALS) tablet Take 1 tablet by mouth daily 90 tablet 3    blood glucose test strips (FREESTYLE LITE) strip 1 each by In Vitro route daily As needed.  100 each 3    Blood Pressure KIT 1 kit by Does not apply route three times daily (Patient not taking: Reported on 6/17/2022) 1 kit 0    blood glucose monitor kit and supplies 1 kit by Other route three times daily Dispense Butterfly Elite CBG Device 1 kit 0     No current facility-administered medications on file prior to encounter. REVIEW OF SYSTEMS    Pertinent items are noted in HPI. Objective:      BP (!) 138/54   Pulse 64   Temp (!) 96.1 °F (35.6 °C) (Tympanic)   Resp 16   Ht 4' 11\" (1.499 m)   Wt 152 lb (68.9 kg)   BMI 30.70 kg/m²     Wt Readings from Last 3 Encounters:   06/21/22 152 lb (68.9 kg)   06/15/22 152 lb 5.4 oz (69.1 kg)   06/11/22 156 lb 8.4 oz (71 kg)       PHYSICAL EXAM    General Appearance: alert and oriented to person, place and time, well-developed and well-nourished, in no acute distress  Skin: warm and dry, no rash, bilateral lower leg wounds   Head: normocephalic and atraumatic  Eyes: pupils equal, round, extraocular eye movements intact, and conjunctivae normal  Pulmonary/Chest: normal air movement, no respiratory distress  Extremities: no cyanosis and no clubbing.  Bilateral lower leg edema   Musculoskeletal: no joint swelling, deformity or tenderness  Neurologic: gait, coordination normal and speech normal      Assessment:     Problem List Items Addressed This Visit     Bilateral lower extremity edema    Relevant Orders    Initiate Outpatient Wound Care Protocol    Non-pressure chronic ulcer of other part of left lower leg limited to breakdown of skin (HCC)    Relevant Orders    Initiate Outpatient Wound Care Protocol    Lymphedema    Relevant Orders    Initiate Outpatient Wound Care Protocol    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 ([address] - Primary    Relevant Orders    Initiate Outpatient Wound Care Protocol    Chronic ulcer of left leg with fat layer exposed ([address]    Venous insufficiency of both lower extremities    Relevant Orders    Initiate Outpatient Wound Care Protocol            Wound 05/20/22 Right Wound #1 right lower  leg cluster- converged with #2 (Active)   Wound Image 06/21/22 1132   Wound Etiology Venous 06/21/22 1132   Dressing Status Clean;Dry; Intact 06/15/22 0800   Wound Cleansed Not Cleansed 06/15/22 0800   Dressing/Treatment Ace wrap 06/15/22 0800   Offloading for Diabetic Foot Ulcers Other (comment) 06/15/22 0800   Dressing Change Due 06/21/22 06/15/22 0800   Wound Length (cm) 0 cm 06/21/22 1132   Wound Width (cm) 0 cm 06/21/22 1132   Wound Depth (cm) 0 cm 06/21/22 1132   Wound Surface Area (cm^2) 0 cm^2 06/21/22 1132   Change in Wound Size % (l*w) 100 06/21/22 1132   Wound Volume (cm^3) 0 cm^3 06/21/22 1132   Wound Healing % 100 06/21/22 1132   Post-Procedure Length (cm) 12 cm 06/01/22 1324   Post-Procedure Width (cm) 30 cm 06/01/22 1324   Post-Procedure Depth (cm) 0.1 cm 06/01/22 1324   Post-Procedure Surface Area (cm^2) 360 cm^2 06/01/22 1324   Post-Procedure Volume (cm^3) 36 cm^3 06/01/22 1324   Wound Assessment Other (Comment) 06/15/22 0800   Drainage Amount None 06/15/22 0800   Drainage Description Serosanguinous; Serous; Yellow 06/15/22 0800   Odor None 06/15/22 0800   Marian-wound Assessment Other (Comment) 06/15/22 0800   Margins Other (Comment) 06/15/22 0800   Wound Thickness Description not for Pressure Injury Full thickness 06/15/22 0800   Number of days: 32       Wound 05/20/22 Left;Proximal Wound # 4 Left lower lat leg (Active)   Wound Image   06/21/22 1132   Wound Etiology Venous 06/21/22 1132   Dressing Status Clean;Dry; Intact 06/15/22 0800   Wound Cleansed Not Cleansed 06/15/22 0800   Dressing/Treatment Ace wrap 06/15/22 0800   Dressing Change Due 06/21/22 06/15/22 0800   Wound Length (cm) 0 cm 06/21/22 1132   Wound Width (cm) 0 cm 06/21/22 1132   Wound Depth (cm) 0 cm 06/21/22 1132   Wound Surface Area (cm^2) 0 cm^2 06/21/22 1132   Change in Wound Size % (l*w) 100 06/21/22 1132   Wound Volume (cm^3) 0 cm^3 06/21/22 1132   Wound Healing % 100 06/21/22 1132   Post-Procedure Length (cm) 13 cm 06/01/22 1324   Post-Procedure Width (cm) 11 cm 06/01/22 1324 Post-Procedure Depth (cm) 0 cm 06/01/22 1324   Post-Procedure Surface Area (cm^2) 143 cm^2 06/01/22 1324   Post-Procedure Volume (cm^3) 0 cm^3 06/01/22 1324   Wound Assessment Other (Comment) 06/15/22 0415   Drainage Amount None 06/15/22 0800   Drainage Description Serosanguinous 06/08/22 1405   Odor None 06/15/22 0800   Marian-wound Assessment Other (Comment) 06/15/22 0800   Margins Other (Comment) 06/15/22 0800   Wound Thickness Description not for Pressure Injury Full thickness 06/15/22 0800   Number of days: 32          Plan:     Treatment Note please see Discharge Instructions    Written patient dismissal instructions given to patient and signed by patient or POA.            Electronically signed by Thai Kimball MD on 6/21/2022 at 11:50 AM

## 2022-06-22 ENCOUNTER — HOSPITAL ENCOUNTER (OUTPATIENT)
Dept: WOUND CARE | Age: 72
Discharge: HOME | End: 2022-06-22
Payer: MEDICARE

## 2022-06-22 ENCOUNTER — TELEPHONE (OUTPATIENT)
Dept: WOUND CARE | Age: 72
End: 2022-06-22

## 2022-06-22 ENCOUNTER — TELEPHONE (OUTPATIENT)
Dept: FAMILY MEDICINE CLINIC | Age: 72
End: 2022-06-22

## 2022-06-22 VITALS
HEART RATE: 66 BPM | HEIGHT: 59 IN | RESPIRATION RATE: 22 BRPM | TEMPERATURE: 95.5 F | BODY MASS INDEX: 30.64 KG/M2 | WEIGHT: 152 LBS | SYSTOLIC BLOOD PRESSURE: 140 MMHG | DIASTOLIC BLOOD PRESSURE: 54 MMHG

## 2022-06-22 DIAGNOSIS — Z79.01 CHRONIC ANTICOAGULATION: ICD-10-CM

## 2022-06-22 DIAGNOSIS — R60.0 BILATERAL LOWER EXTREMITY EDEMA: ICD-10-CM

## 2022-06-22 DIAGNOSIS — L97.912 ULCER OF RIGHT LEG, WITH FAT LAYER EXPOSED (HCC): Primary | ICD-10-CM

## 2022-06-22 DIAGNOSIS — I87.2 VENOUS INSUFFICIENCY OF BOTH LOWER EXTREMITIES: ICD-10-CM

## 2022-06-22 DIAGNOSIS — D64.9 SEVERE ANEMIA: ICD-10-CM

## 2022-06-22 DIAGNOSIS — L97.821 NON-PRESSURE CHRONIC ULCER OF OTHER PART OF LEFT LOWER LEG LIMITED TO BREAKDOWN OF SKIN (HCC): ICD-10-CM

## 2022-06-22 DIAGNOSIS — J44.9 CHRONIC OBSTRUCTIVE PULMONARY DISEASE, UNSPECIFIED COPD TYPE (HCC): ICD-10-CM

## 2022-06-22 DIAGNOSIS — L97.811 NON-PRESSURE CHRONIC ULCER OF OTHER PART OF RIGHT LOWER LEG LIMITED TO BREAKDOWN OF SKIN (HCC): ICD-10-CM

## 2022-06-22 DIAGNOSIS — I48.0 PAROXYSMAL ATRIAL FIBRILLATION (HCC): Primary | ICD-10-CM

## 2022-06-22 DIAGNOSIS — L97.922 LEG ULCER, LEFT, WITH FAT LAYER EXPOSED (HCC): ICD-10-CM

## 2022-06-22 DIAGNOSIS — D50.9 IRON DEFICIENCY ANEMIA, UNSPECIFIED IRON DEFICIENCY ANEMIA TYPE: ICD-10-CM

## 2022-06-22 DIAGNOSIS — I89.0 LYMPHEDEMA: ICD-10-CM

## 2022-06-22 PROCEDURE — 97597 DBRDMT OPN WND 1ST 20 CM/<: CPT

## 2022-06-22 PROCEDURE — 97597 DBRDMT OPN WND 1ST 20 CM/<: CPT | Performed by: NURSE PRACTITIONER

## 2022-06-22 RX ORDER — LIDOCAINE HYDROCHLORIDE 20 MG/ML
JELLY TOPICAL ONCE
OUTPATIENT
Start: 2022-06-22 | End: 2022-06-22

## 2022-06-22 RX ORDER — FERROUS SULFATE 325(65) MG
325 TABLET ORAL
Qty: 60 TABLET | Refills: 0 | Status: ON HOLD | OUTPATIENT
Start: 2022-06-22 | End: 2022-07-18 | Stop reason: SDUPTHER

## 2022-06-22 RX ORDER — FERROUS SULFATE 325(65) MG
325 TABLET ORAL
Qty: 180 TABLET | Refills: 3 | Status: ON HOLD | OUTPATIENT
Start: 2022-06-22 | End: 2022-07-11 | Stop reason: HOSPADM

## 2022-06-22 RX ORDER — LIDOCAINE HYDROCHLORIDE 40 MG/ML
SOLUTION TOPICAL ONCE
Status: COMPLETED | OUTPATIENT
Start: 2022-06-22 | End: 2022-06-22

## 2022-06-22 RX ORDER — LIDOCAINE HYDROCHLORIDE 40 MG/ML
SOLUTION TOPICAL ONCE
OUTPATIENT
Start: 2022-06-22 | End: 2022-06-22

## 2022-06-22 RX ADMIN — LIDOCAINE HYDROCHLORIDE 10 ML: 40 SOLUTION TOPICAL at 10:09

## 2022-06-22 ASSESSMENT — PAIN SCALES - GENERAL: PAINLEVEL_OUTOF10: 2

## 2022-06-22 NOTE — PLAN OF CARE
Problem: Chronic Conditions and Co-morbidities  Goal: Patient's chronic conditions and co-morbidity symptoms are monitored and maintained or improved  Outcome: Progressing     Problem: Discharge Planning  Goal: Discharge to home or other facility with appropriate resources  Outcome: Progressing     Problem: Wound:  Goal: Will show signs of wound healing; wound closure and no evidence of infection  Description: Will show signs of wound healing; wound closure and no evidence of infection  Outcome: Progressing     Problem: Falls - Risk of:  Goal: Will remain free from falls  Description: Will remain free from falls  Outcome: Progressing     Problem: Pain  Goal: Verbalizes/displays adequate comfort level or baseline comfort level  Outcome: Progressing

## 2022-06-22 NOTE — TELEPHONE ENCOUNTER
Spoke with daughter and let her know that medications were sent to pharmacy. She stated that all wounds were infected as well.

## 2022-06-22 NOTE — PROGRESS NOTES
[address]   Progress Note and Procedure Note      Kacie Neville  MEDICAL RECORD NUMBER:  [id number]  AGE: 66 y.o. GENDER: female  : 1950  EPISODE DATE:  2022    Subjective:     Chief Complaint   Patient presents with    Wound Check     Right leg, Left leg         HISTORY of PRESENT ILLNESS HPI     Rafaela Robertson is a 66 y.o. female who presents today for wound/ulcer evaluation. History of Wound Context: presents with daughter with concerns regarding large fluid filled blisters to bilateral lower legs. She was seen here yesterday and wounds were healed. Sent home in compression stockings. This morning woke up with fluid draining from legs and large blisters. Will wrap in unna boot today. advised needs to be changed twice weekly and reluctantly agreed to home care consult. Similar episode happened after stopped using unna boots when wounds were healed. She had blisters and wounds within few days. We did discuss updated primary care, nephrology and cardiology as may need medication adjustment.    Wound/Ulcer Pain Timing/Severity: intermittent  Quality of pain: aching  Severity:  2 / 10   Modifying Factors: None  Associated Signs/Symptoms: edema and drainage    Ulcer Identification:  Ulcer Type: venous  Contributing Factors: edema, venous stasis, lymphedema and decreased mobility    Wound: N/A        PAST MEDICAL HISTORY        Diagnosis Date    Acute CVA (cerebrovascular accident) ([address] 2020    Acute kidney injury superimposed on chronic kidney disease ([address] 2022    Acute on chronic combined systolic (congestive) and diastolic (congestive) heart failure ([address] 2022    JOY (acute kidney injury) ([address] 1/15/2022    Altered mental status 2021    Anticoagulated 2022    Arthritis     Asthma     Bradycardia 2022    Brain mass     Cellulitis and abscess     Cellulitis and abscess of unspecified site     Cellulitis of both lower extremities 5/26/2021    Cellulitis of left lower extremity 7/26/2020    Cellulitis of lower extremity 10/4/2020    CHF (congestive heart failure) (HCC)     Chronic kidney disease, unspecified     CKD stage 4 secondary to hypertension ([facility] 2/20/2020    Coronary atherosclerosis of native coronary artery     Elevated LFTs 2/25/2021    Essential hypertension 7/25/2020    Essential hypertension, malignant     Hallucinations 2/18/2021    Hard of hearing     Head contusion 9/9/2020    Hypertensive emergency 7/25/2020    Hypertensive urgency with nyla 6/9/2022    Hypokalemia 9/9/2020    Hypomagnesemia 5/26/2021    Iron deficiency anemia, unspecified     Meningioma ([facility] 2/25/2021    Myocardial infarction (HCC)     Other and unspecified hyperlipidemia     Pure hypercholesterolemia     Stage 4 chronic kidney disease ([facility]     Toxic metabolic encephalopathy 2/64/0613    Type 2 diabetes mellitus with stage 4 chronic kidney disease, without long-term current use of insulin (HCC)     Type II or unspecified type diabetes mellitus without mention of complication, not stated as uncontrolled     Unspecified asthma(493.90)     Unspecified venous (peripheral) insufficiency     Unspecified vitamin D deficiency     UTI (urinary tract infection) 2/18/2021       PAST SURGICAL HISTORY    Past Surgical History:   Procedure Laterality Date    COLONOSCOPY N/A 3/15/2022    COLONOSCOPY DIAGNOSTIC performed by Vicki Ny MD at [facility].      x 3, Dr. Evette Corral  3/7/2022         THORACENTESIS  3/10/2022         TONSILLECTOMY AND ADENOIDECTOMY      UPPER GASTROINTESTINAL ENDOSCOPY N/A 3/15/2022    EGD BIOPSY performed by Vicki Ny MD at [address] [place] [address]    Family History   Problem Relation Age of Onset    Diabetes Mother     Heart Disease Mother     Heart Disease Father     Diabetes Sister     High Blood Pressure Sister     Diabetes Maternal Grandmother        SOCIAL HISTORY    Social History     Tobacco Use    Smoking status: Former Smoker     Packs/day: 0.25     Years: 10.00     Pack years: 2.50     Quit date: 2000     Years since quittin.4    Smokeless tobacco: Never Used   Vaping Use    Vaping Use: Never used   Substance Use Topics    Alcohol use: Not Currently     Alcohol/week: 0.0 standard drinks    Drug use: No       ALLERGIES    Allergies   Allergen Reactions    Latex Rash    Aleve [Naproxen Sodium]      Chronic kidney disease stage III, CHF    Pioglitazone Other (See Comments)     Congestive heart failure    Claritin [Loratadine]     Keflex [Cephalexin]     Lisinopril      Needs clarification of contraindication       MEDICATIONS    Current Outpatient Medications on File Prior to Encounter   Medication Sig Dispense Refill    metoprolol tartrate (LOPRESSOR) 25 MG tablet Take 1 tablet by mouth 2 times daily 60 tablet 3    aspirin EC 81 MG EC tablet Take 1 tablet by mouth daily 90 tablet 1    hydrALAZINE (APRESOLINE) 25 MG tablet Take 1 tablet by mouth 3 times daily 90 tablet 3    albuterol sulfate HFA (PROAIR HFA) 108 (90 Base) MCG/ACT inhaler Inhale 2 puffs into the lungs every 6 hours as needed for Wheezing or Shortness of Breath (cough) 8 g 3    fluticasone-salmeterol (ADVAIR HFA) 115-21 MCG/ACT inhaler Inhale 2 puffs into the lungs 2 times daily 12 g 3    metoprolol tartrate (LOPRESSOR) 25 MG tablet Take 1 tablet by mouth 2 times daily 180 tablet 3    aspirin EC 81 MG EC tablet Take 1 tablet by mouth daily 90 tablet 3    hydrALAZINE (APRESOLINE) 25 MG tablet Take 1 tablet by mouth 3 times daily 270 tablet 3    albuterol sulfate HFA (PROAIR HFA) 108 (90 Base) MCG/ACT inhaler Inhale 2 puffs into the lungs every 6 hours as needed for Wheezing or Shortness of Breath (cough) 18 g 3    fluticasone-salmeterol (ADVAIR HFA) 115-21 MCG/ACT inhaler Inhale 2 puffs into the lungs 2 times daily Maintenance inhaler 36 g 3    amLODIPine (NORVASC) 10 MG tablet Take 1 tablet by mouth nightly 30 tablet 3    Blood Pressure KIT Diagnosis: HTN. Needs to check blood pressure 1-2 times a day until stable, then once a day. Goal blood pressure less than 135/85, and above 110/60. 1 kit 0    vitamin D (ERGOCALCIFEROL) 1.25 MG (98039 UT) CAPS capsule Take 1 capsule by mouth once a week Sundays 12 capsule 0    amiodarone (CORDARONE) 200 MG tablet Take 1 tablet by mouth daily 90 tablet 0    ferrous sulfate (IRON 325) 325 (65 Fe) MG tablet Take 1 tablet by mouth daily (with breakfast) 180 tablet 1    apixaban (ELIQUIS) 2.5 MG TABS tablet Take 1 tablet by mouth 2 times daily 60 tablet 0    Nebulizers (COMPRESSOR/NEBULIZER) MISC Nebulizer with compressor. Disposable Med Nebs 2 /month. Reusable Med Nebs 1 per 6 months. Aerosol Mask 1 per month. Replacement Filters 2 per month. All other related supplies as needed per month. Medications being used: Albuterol . 083 unit dose vial. Frequency: every 6 hrs x 4/day . Length of Need: 1 (Patient not taking: Reported on 6/17/2022) 1 each 3    albuterol (PROVENTIL) (2.5 MG/3ML) 0.083% nebulizer solution Take 3 mLs by nebulization every 6 hours as needed for Wheezing or Shortness of Breath (Patient not taking: Reported on 6/17/2022) 125 each 0    Misc.  Devices (ADJUST FOLD CANE/YORK HANDLE) MISC Use daily for walking 1 each 0    Handicap Placard MISC by Does not apply route Expires on 12/30/25 1 each 0    atorvastatin (LIPITOR) 40 MG tablet Take 1 tablet by mouth once daily 90 tablet 3    desloratadine (CLARINEX) 5 MG tablet Take 1 tablet by mouth once daily 90 tablet 3    FreeStyle Lancets MISC 1 each by Does not apply route daily Patient needs to contact office before any further refills will be approved 90 each 3    magnesium oxide (MAG-OX) 400 (241.3 Mg) MG TABS tablet Take 1 tablet by mouth twice daily 180 tablet 3    miconazole (MICOTIN) 2 % powder Apply topically 2 times daily UNDER THE SKIN FOLDS LONG TERM 45 g 1    Multiple Vitamins-Minerals (THERAPEUTIC MULTIVITAMIN-MINERALS) tablet Take 1 tablet by mouth daily 90 tablet 3    blood glucose test strips (FREESTYLE LITE) strip 1 each by In Vitro route daily As needed. 100 each 3    Blood Pressure KIT 1 kit by Does not apply route three times daily (Patient not taking: Reported on 6/17/2022) 1 kit 0    blood glucose monitor kit and supplies 1 kit by Other route three times daily Dispense Butterfly Elite CBG Device 1 kit 0     No current facility-administered medications on file prior to encounter.        REVIEW OF SYSTEMS    Constitutional: negative except for fatigue  Eyes: negative  Ears, nose, mouth, throat, and face: negative except for hard of hearing  Respiratory: negative except for shortness of breath  Cardiovascular: negative except for dyspnea, fatigue and lower extremity edema  Gastrointestinal: negative  Genitourinary:negative  Integument/breast: negative except for bilateral lower leg ulcers  Hematologic/lymphatic: negative  Neurological: negative  Behavioral/Psych: negative except for anxiety  Endocrine: negative  Allergic/Immunologic: negative    Objective:      BP (!) 140/54   Pulse 66   Temp (!) 95.5 °F (35.3 °C) (Tympanic)   Resp 22   Ht 4' 11\" (1.499 m)   Wt 152 lb (68.9 kg)   BMI 30.70 kg/m²     Wt Readings from Last 3 Encounters:   06/22/22 152 lb (68.9 kg)   06/21/22 152 lb (68.9 kg)   06/15/22 152 lb 5.4 oz (69.1 kg)       PHYSICAL EXAM    General Appearance: alert and oriented to person, place and time, well-developed and well-nourished, in no acute distress  Skin: warm and dry, no rash or erythema  Head: normocephalic and atraumatic  Eyes: pupils equal, round, extraocular eye movements intact, and conjunctivae normal  Pulmonary/Chest: normal air movement, no respiratory distress  Extremities: no cyanosis and no clubbing or edema   Musculoskeletal: no joint swelling, deformity or tenderness  Neurologic: gait, coordination normal and speech normal      Assessment:     Problem List Items Addressed This Visit     Bilateral lower extremity edema    Relevant Orders    Initiate Outpatient Wound Care Protocol    Leg ulcer, left, with fat layer exposed ([address]    Lymphedema    Relevant Orders    Initiate Outpatient Wound Care Protocol    Ulcer of right leg, with fat layer exposed ([address] - Primary    Venous insufficiency of both lower extremities    Relevant Orders    Initiate Outpatient Wound Care Protocol           Procedure Note  Indications:  Based on my examination of this patient's wound(s)/ulcer(s) today, debridement is required to promote healing and evaluate the wound base. Performed by: MAL Mcmahon CNP    Consent obtained:  Yes    Time out taken:  Yes    Pain Control: Anesthetic  Anesthetic: 4% Lidocaine Liquid Topical       Debridement:Other (comment) open debridement    Using scissors and forceps the wound(s)/ulcer(s) was/were sharply debrided down through and including the removal of dermis. Devitalized Tissue Debrided:  slough and exudate    Pre Debridement Measurements:  Are located in the [facility]  Documentation Flow Sheet    Wound/Ulcer #: 2 and 4    Post Debridement Measurements:  Wound/Ulcer Descriptions are Pre Debridement except measurements:    Wound 05/20/22 Right Wound #1 right lower  leg cluster- converged with #2 (Active)   Wound Image   06/22/22 1011   Wound Etiology Venous 06/22/22 1011   Dressing Status New drainage noted; Old drainage noted 06/22/22 1011   Wound Cleansed Cleansed with saline 06/22/22 1011   Dressing/Treatment Ace wrap 06/15/22 0800   Offloading for Diabetic Foot Ulcers Other (comment) 06/15/22 0800   Dressing Change Due 06/21/22 06/15/22 0800   Wound Length (cm) 14 cm 06/22/22 1011   Wound Width (cm) 36.5 cm 06/22/22 1011   Wound Depth (cm) 0.1 cm 06/22/22 1011   Wound Surface Area (cm^2) 511 cm^2 06/22/22 1011   Change in Wound Size % (l*w) -3306.67 06/22/22 1011   Wound Volume (cm^3) 51.1 cm^3 06/22/22 1011   Wound Healing % -3307 06/22/22 1011   Post-Procedure Length (cm) 14 cm 06/22/22 1011   Post-Procedure Width (cm) 36.5 cm 06/22/22 1011   Post-Procedure Depth (cm) 0.1 cm 06/22/22 1011   Post-Procedure Surface Area (cm^2) 511 cm^2 06/22/22 1011   Post-Procedure Volume (cm^3) 51.1 cm^3 06/22/22 1011   Wound Assessment Fluid filled blister;Pink/red 06/22/22 1011   Drainage Amount Large 06/22/22 1011   Drainage Description Serous; Yellow 06/22/22 1011   Odor None 06/22/22 1011   Marian-wound Assessment Blisters 06/22/22 1011   Margins Defined edges 06/22/22 1011   Wound Thickness Description not for Pressure Injury Full thickness 06/22/22 1011   Number of days: 33       Wound 05/20/22 Left;Proximal Wound # 4 Left lower lat leg (Active)   Wound Image   06/22/22 1011   Wound Etiology Venous 06/22/22 1011   Dressing Status New drainage noted; Old drainage noted 06/22/22 1011   Wound Cleansed Cleansed with saline 06/22/22 1011   Dressing/Treatment Ace wrap 06/15/22 0800   Dressing Change Due 06/21/22 06/15/22 0800   Wound Length (cm) 11.5 cm 06/22/22 1011   Wound Width (cm) 9 cm 06/22/22 1011   Wound Depth (cm) 0.1 cm 06/22/22 1011   Wound Surface Area (cm^2) 103.5 cm^2 06/22/22 1011   Change in Wound Size % (l*w) -599.32 06/22/22 1011   Wound Volume (cm^3) 10.35 cm^3 06/22/22 1011   Wound Healing % -599 06/22/22 1011   Post-Procedure Length (cm) 11.5 cm 06/22/22 1011   Post-Procedure Width (cm) 9 cm 06/22/22 1011   Post-Procedure Depth (cm) 0.1 cm 06/22/22 1011   Post-Procedure Surface Area (cm^2) 103.5 cm^2 06/22/22 1011   Post-Procedure Volume (cm^3) 10.35 cm^3 06/22/22 1011   Wound Assessment Pink/red;Fluid filled blister 06/22/22 1011   Drainage Amount Large 06/22/22 1011   Drainage Description Serous; Yellow 06/22/22 1011   Odor None 06/22/22 1011   Marian-wound Assessment Blisters 06/22/22 1011   Margins Defined edges 06/22/22 1011   Wound Thickness Description not for Pressure Injury Full thickness 06/22/22 1011   Number of days: 33          Percent of Wound(s)/Ulcer(s) Debrided: 1%    Total Surface Area Debrided:  6.15 sq cm     Estimated Blood Loss:  Minimal    Hemostasis Achieved:  by pressure    Procedural Pain:  2  / 10     Post Procedural Pain:  0 / 10     Response to treatment:  Well tolerated by patient. Plan:     Treatment Note please see Discharge Instructions    Written patient dismissal instructions given to patient and signed by patient or POA.            Electronically signed by Alfonso Rubinstein, APRN - CNP on 6/22/2022 at 11:16 AM

## 2022-06-22 NOTE — TELEPHONE ENCOUNTER
The advair inhaler is still over $100 and daughter would like to know if you can send this to [facility] since it will be $0 for her out of pocket. Please advise. Daughter is also concerned with her mom because she noticed she has some Shallow breathing, daughter is concerned because she has chronic heart failure and she has done this in the past. Daughter states she does have a cough and the shallow breathing. This has been going on for several days. Daughter stated she will be taking her mom to get some blood work and lab work ordered by another doctor but wanted to see if there was anything you can order so they can get it done. Please advise.

## 2022-06-22 NOTE — TELEPHONE ENCOUNTER
PATIENT WENT TO WOUND CARE TODAY CAN YOU CHECK THE NOTE FROM VISIT. CONCERNS WITH BLISTERS WANTED TO INFORM YOU. ALSO I DID PEND SOME RX FOR YOU PER PATIENT NEEDED. Please Approve or Refuse.   Send to Pharmacy per Pt's Request:      Next Visit Date:  8/4/2022   Last Visit Date: 6/17/2022    Hemoglobin A1C (%)   Date Value   03/31/2022 4.6   11/24/2021 5.8   06/29/2021 5.7             ( goal A1C is < 7)   BP Readings from Last 3 Encounters:   06/22/22 (!) 140/54   06/21/22 (!) 138/54   06/15/22 (!) 168/80          (goal 120/80)  BUN   Date Value Ref Range Status   06/15/2022 69 (H) 8 - 23 mg/dL Final     CREATININE   Date Value Ref Range Status   06/15/2022 2.34 (H) 0.50 - 0.90 mg/dL Final     Potassium   Date Value Ref Range Status   06/15/2022 4.5 3.7 - 5.3 mmol/L Final

## 2022-06-22 NOTE — DISCHARGE INSTRUCTIONS
[address] [place] [facility]                                 Visit  Discharge Instructions / Physician Orders  DATE:6/22/22     Home Care: Kerline Sánchez first visit Friday 6/24/2022 ( change unna boots on Fridays)     SUPPLIES ORDERED THRU:  [facility] (6/22/2022, home care now in place, per medicare, home needs to supply dressings while seeing patient)               Wound Location:  right and left legs     Cleanse with: Keep dry and intact     Dressing Orders:  Bilat legs Primary dressing  Silvercel, Then Kerramax wrap in [place] unna                                                                           .  Frequency: Keep dry intact, will need changed twice a week, once in wound care and once by home care on fridays.     Additional Orders: Increase protein to diet (meat, cheese, eggs, fish, peanut butter, nuts and beans)  Multivitamin daily     OFFLOADING []??? YES  TYPE:                  [x]? ?? NA     Weekly wound care visits until determined otherwise.     Antibiotic therapy-wound care related YES []??? NO []??? NA[x]? ??     MY CHART []???     Smart Device  []???      HYPERBARIC TREATMENT-                TREATMENT #                          Your next appointment with the [address] [place] [address] is in 1 week                                                                                                     (Please note your next appointment above and if you are unable to keep, kindly give a 24 hour notice.  Thank you.)  If more than 15 min late we cannot guarantee you will be seen due to clinician schedule  Per Policy, Excessive cancellation will call for dismissal from program.  If you experience any of the following, please call the [address] [place] [address] during business hours:  [phone]     * Increase in Pain  * Temperature over 101  * Increase in drainage from your wound  * Drainage with a foul odor  * Bleeding  * Increase in swelling  * Need for compression bandage changes due to slippage, breakthrough drainage.     If you need medical attention outside of the business hours of the [address] [place] [address] Road please contact your PCP or go to the nearest emergency room.     The information contained in the After Visit Summary has been reviewed with me, the patient and/or responsible adult, by my health care provider(s). I had the opportunity to ask questions regarding this information. I have elected to receive;      []? ? ? After Visit Summary  [x]? ?? Comprehensive Discharge Instruction        Patient signature______________________________________Date:________  Electronically signed by Hien Carrington RN on 6/22/2022 at 11:10 AM   Electronically signed by MAL Otero - CNP on 6/22/2022 at 11:11 AM

## 2022-06-25 NOTE — RESULT ENCOUNTER NOTE
Please check with patient if she has appointment with cardiologist    Holter monitor showing 4 short runs of SVT and paroxysmal atrial tachycardia, which means some episodes of rapid heartbeats    Patient has known paroxysmal atrial fibrillation  Continue chronic anticoagulation with apixaban and rate controlled with metoprolol      Future Appointments  6/28/2022  4:00 PM    MD Elysia   [facility]        [facility]  6/29/2022  11:15 AM   Kory Huizar[address]  8/3/2022   3:00 PM    Windy Rdz MD          [address]  8/4/2022   2:30 PM    [facility]t Altman [facility]     [facility]  9/14/2022  1:30 PM    [facility] [place] -[facility]           [facility]  11/25/2022 3:30 PM    [facility] Altman [facility]     [facility]

## 2022-06-27 ENCOUNTER — APPOINTMENT (OUTPATIENT)
Dept: CT IMAGING | Age: 72
DRG: 308 | End: 2022-06-27
Payer: OTHER GOVERNMENT

## 2022-06-27 ENCOUNTER — APPOINTMENT (OUTPATIENT)
Dept: GENERAL RADIOLOGY | Age: 72
DRG: 308 | End: 2022-06-27
Payer: OTHER GOVERNMENT

## 2022-06-27 ENCOUNTER — HOSPITAL ENCOUNTER (INPATIENT)
Age: 72
LOS: 21 days | Discharge: NURSING, REHAB OR LONG-TERM CARE | DRG: 308 | End: 2022-07-18
Attending: EMERGENCY MEDICINE | Admitting: INTERNAL MEDICINE
Payer: OTHER GOVERNMENT

## 2022-06-27 DIAGNOSIS — D64.9 SEVERE ANEMIA: ICD-10-CM

## 2022-06-27 DIAGNOSIS — R00.1 BRADYCARDIA: Primary | ICD-10-CM

## 2022-06-27 DIAGNOSIS — D50.9 IRON DEFICIENCY ANEMIA, UNSPECIFIED IRON DEFICIENCY ANEMIA TYPE: ICD-10-CM

## 2022-06-27 LAB
-: ABNORMAL
ABSOLUTE EOS #: 0.1 K/UL (ref 0–0.4)
ABSOLUTE IMMATURE GRANULOCYTE: 0.1 K/UL (ref 0–0.3)
ABSOLUTE LYMPH #: 0.21 K/UL (ref 1–4.8)
ABSOLUTE MONO #: 0.52 K/UL (ref 0.1–0.8)
ALBUMIN SERPL-MCNC: 2.6 G/DL (ref 3.5–5.2)
ALBUMIN/GLOBULIN RATIO: 1 (ref 1–2.5)
ALP BLD-CCNC: 162 U/L (ref 35–104)
ALT SERPL-CCNC: 25 U/L (ref 5–33)
ANION GAP SERPL CALCULATED.3IONS-SCNC: 11 MMOL/L (ref 9–17)
ANION GAP SERPL CALCULATED.3IONS-SCNC: 11 MMOL/L (ref 9–17)
ANION GAP: 5 MMOL/L (ref 7–16)
AST SERPL-CCNC: 38 U/L
BASOPHILS # BLD: 1 % (ref 0–2)
BASOPHILS ABSOLUTE: 0.1 K/UL (ref 0–0.2)
BILIRUB SERPL-MCNC: <0.1 MG/DL (ref 0.3–1.2)
BILIRUBIN DIRECT: <0.08 MG/DL
BILIRUBIN URINE: NEGATIVE
BILIRUBIN, INDIRECT: ABNORMAL MG/DL (ref 0–1)
BUN BLDV-MCNC: 64 MG/DL (ref 8–23)
BUN BLDV-MCNC: 65 MG/DL (ref 8–23)
CALCIUM SERPL-MCNC: 8.7 MG/DL (ref 8.6–10.4)
CALCIUM SERPL-MCNC: 9 MG/DL (ref 8.6–10.4)
CARBOXYHEMOGLOBIN: 3.1 % (ref 0–5)
CASTS UA: ABNORMAL /LPF (ref 0–2)
CHLORIDE BLD-SCNC: 107 MMOL/L (ref 98–107)
CHLORIDE BLD-SCNC: 108 MMOL/L (ref 98–107)
CO2: 18 MMOL/L (ref 20–31)
CO2: 21 MMOL/L (ref 20–31)
COLOR: ABNORMAL
CREAT SERPL-MCNC: 2.13 MG/DL (ref 0.5–0.9)
CREAT SERPL-MCNC: 2.17 MG/DL (ref 0.5–0.9)
EOSINOPHILS RELATIVE PERCENT: 1 % (ref 1–4)
EPITHELIAL CELLS UA: ABNORMAL /HPF (ref 0–5)
FIO2: ABNORMAL
GFR AFRICAN AMERICAN: 27 ML/MIN
GFR AFRICAN AMERICAN: 28 ML/MIN
GFR NON-AFRICAN AMERICAN: 15 ML/MIN
GFR NON-AFRICAN AMERICAN: 22 ML/MIN
GFR NON-AFRICAN AMERICAN: 23 ML/MIN
GFR SERPL CREATININE-BSD FRML MDRD: 18 ML/MIN
GFR SERPL CREATININE-BSD FRML MDRD: ABNORMAL ML/MIN/{1.73_M2}
GLUCOSE BLD-MCNC: 122 MG/DL (ref 70–99)
GLUCOSE BLD-MCNC: 127 MG/DL (ref 65–105)
GLUCOSE BLD-MCNC: 169 MG/DL (ref 74–100)
GLUCOSE BLD-MCNC: 170 MG/DL (ref 70–99)
GLUCOSE URINE: ABNORMAL
HCO3 VENOUS: 21.9 MMOL/L (ref 24–30)
HCO3 VENOUS: 25.2 MMOL/L (ref 22–29)
HCT VFR BLD CALC: 35.2 % (ref 36.3–47.1)
HCT VFR BLD CALC: 39.4 % (ref 36.3–47.1)
HEMOGLOBIN: 10.5 G/DL (ref 11.9–15.1)
HEMOGLOBIN: 11.2 G/DL (ref 11.9–15.1)
IMMATURE GRANULOCYTES: 1 %
KETONES, URINE: ABNORMAL
LACTIC ACID, SEPSIS WHOLE BLOOD: 1.9 MMOL/L (ref 0.5–1.9)
LACTIC ACID, SEPSIS WHOLE BLOOD: 2.8 MMOL/L (ref 0.5–1.9)
LEUKOCYTE ESTERASE, URINE: ABNORMAL
LYMPHOCYTES # BLD: 2 % (ref 24–44)
MCH RBC QN AUTO: 30.8 PG (ref 25.2–33.5)
MCHC RBC AUTO-ENTMCNC: 29.8 G/DL (ref 28.4–34.8)
MCV RBC AUTO: 103.2 FL (ref 82.6–102.9)
MONOCYTES # BLD: 5 % (ref 1–7)
MORPHOLOGY: ABNORMAL
MORPHOLOGY: ABNORMAL
MUCUS: ABNORMAL
NEGATIVE BASE EXCESS, VEN: 3 (ref 0–2)
NEGATIVE BASE EXCESS, VEN: 4.7 MMOL/L (ref 0–2)
NITRITE, URINE: NEGATIVE
NRBC AUTOMATED: 0 PER 100 WBC
O2 SAT, VEN: 49 % (ref 60–85)
O2 SAT, VEN: 78.4 % (ref 60–85)
PARTIAL THROMBOPLASTIN TIME: 22.3 SEC (ref 20.5–30.5)
PATIENT TEMP: 37
PCO2, VEN: 50.1 (ref 39–55)
PCO2, VEN: 60.3 MM HG (ref 41–51)
PDW BLD-RTO: 16.8 % (ref 11.8–14.4)
PH UA: 5.5 (ref 5–8)
PH VENOUS: 7.23 (ref 7.32–7.43)
PH VENOUS: 7.26 (ref 7.32–7.42)
PLATELET # BLD: 232 K/UL (ref 138–453)
PMV BLD AUTO: 10.2 FL (ref 8.1–13.5)
PO2, VEN: 31.9 MM HG (ref 30–50)
PO2, VEN: 42.4 (ref 30–50)
POC BUN: 72 MG/DL (ref 8–26)
POC CHLORIDE: 111 MMOL/L (ref 98–107)
POC CREATININE: 3.05 MG/DL (ref 0.51–1.19)
POC HEMATOCRIT: 35 % (ref 36–46)
POC HEMOGLOBIN: 12 G/DL (ref 12–16)
POC IONIZED CALCIUM: 1.27 MMOL/L (ref 1.15–1.33)
POC LACTIC ACID: 1.41 MMOL/L (ref 0.56–1.39)
POC POTASSIUM: 5.4 MMOL/L (ref 3.5–4.5)
POC SODIUM: 141 MMOL/L (ref 138–146)
POC TCO2: 26 MMOL/L (ref 22–30)
POTASSIUM SERPL-SCNC: 5.5 MMOL/L (ref 3.7–5.3)
POTASSIUM SERPL-SCNC: 5.5 MMOL/L (ref 3.7–5.3)
PRO-BNP: 7757 PG/ML
PROCALCITONIN: 0.11 NG/ML
PROTEIN UA: ABNORMAL
RBC # BLD: 3.41 M/UL (ref 3.95–5.11)
RBC UA: ABNORMAL /HPF (ref 0–2)
REASON FOR REJECTION: NORMAL
SARS-COV-2, RAPID: NOT DETECTED
SEG NEUTROPHILS: 90 % (ref 36–66)
SEGMENTED NEUTROPHILS ABSOLUTE COUNT: 9.37 K/UL (ref 1.8–7.7)
SODIUM BLD-SCNC: 136 MMOL/L (ref 135–144)
SODIUM BLD-SCNC: 140 MMOL/L (ref 135–144)
SPECIFIC GRAVITY UA: 1.03 (ref 1–1.03)
SPECIMEN DESCRIPTION: NORMAL
THYROXINE, FREE: 0.93 NG/DL (ref 0.93–1.7)
TOTAL PROTEIN: 5.3 G/DL (ref 6.4–8.3)
TROPONIN, HIGH SENSITIVITY: 31 NG/L (ref 0–14)
TROPONIN, HIGH SENSITIVITY: 32 NG/L (ref 0–14)
TSH SERPL DL<=0.05 MIU/L-ACNC: 9.54 UIU/ML (ref 0.3–5)
TURBIDITY: ABNORMAL
URINE HGB: ABNORMAL
UROBILINOGEN, URINE: NORMAL
WBC # BLD: 10.4 K/UL (ref 3.5–11.3)
WBC UA: ABNORMAL /HPF (ref 0–5)
ZZ NTE CLEAN UP: ORDERED TEST: NORMAL
ZZ NTE WITH NAME CLEAN UP: SPECIMEN SOURCE: NORMAL

## 2022-06-27 PROCEDURE — 87040 BLOOD CULTURE FOR BACTERIA: CPT

## 2022-06-27 PROCEDURE — 36415 COLL VENOUS BLD VENIPUNCTURE: CPT

## 2022-06-27 PROCEDURE — 51702 INSERT TEMP BLADDER CATH: CPT

## 2022-06-27 PROCEDURE — 99291 CRITICAL CARE FIRST HOUR: CPT | Performed by: INTERNAL MEDICINE

## 2022-06-27 PROCEDURE — 6360000002 HC RX W HCPCS: Performed by: STUDENT IN AN ORGANIZED HEALTH CARE EDUCATION/TRAINING PROGRAM

## 2022-06-27 PROCEDURE — 96375 TX/PRO/DX INJ NEW DRUG ADDON: CPT

## 2022-06-27 PROCEDURE — 2500000003 HC RX 250 WO HCPCS: Performed by: STUDENT IN AN ORGANIZED HEALTH CARE EDUCATION/TRAINING PROGRAM

## 2022-06-27 PROCEDURE — 85025 COMPLETE CBC W/AUTO DIFF WBC: CPT

## 2022-06-27 PROCEDURE — 84439 ASSAY OF FREE THYROXINE: CPT

## 2022-06-27 PROCEDURE — 71045 X-RAY EXAM CHEST 1 VIEW: CPT

## 2022-06-27 PROCEDURE — 96374 THER/PROPH/DIAG INJ IV PUSH: CPT

## 2022-06-27 PROCEDURE — 87635 SARS-COV-2 COVID-19 AMP PRB: CPT

## 2022-06-27 PROCEDURE — 2500000003 HC RX 250 WO HCPCS: Performed by: INTERNAL MEDICINE

## 2022-06-27 PROCEDURE — 94640 AIRWAY INHALATION TREATMENT: CPT

## 2022-06-27 PROCEDURE — 2580000003 HC RX 258: Performed by: STUDENT IN AN ORGANIZED HEALTH CARE EDUCATION/TRAINING PROGRAM

## 2022-06-27 PROCEDURE — 6370000000 HC RX 637 (ALT 250 FOR IP): Performed by: INTERNAL MEDICINE

## 2022-06-27 PROCEDURE — 81001 URINALYSIS AUTO W/SCOPE: CPT

## 2022-06-27 PROCEDURE — 82803 BLOOD GASES ANY COMBINATION: CPT

## 2022-06-27 PROCEDURE — 83880 ASSAY OF NATRIURETIC PEPTIDE: CPT

## 2022-06-27 PROCEDURE — 06HY33Z INSERTION OF INFUSION DEVICE INTO LOWER VEIN, PERCUTANEOUS APPROACH: ICD-10-PCS | Performed by: INTERNAL MEDICINE

## 2022-06-27 PROCEDURE — 80051 ELECTROLYTE PANEL: CPT

## 2022-06-27 PROCEDURE — 84443 ASSAY THYROID STIM HORMONE: CPT

## 2022-06-27 PROCEDURE — 87641 MR-STAPH DNA AMP PROBE: CPT

## 2022-06-27 PROCEDURE — 87086 URINE CULTURE/COLONY COUNT: CPT

## 2022-06-27 PROCEDURE — 83605 ASSAY OF LACTIC ACID: CPT

## 2022-06-27 PROCEDURE — 82565 ASSAY OF CREATININE: CPT

## 2022-06-27 PROCEDURE — 82805 BLOOD GASES W/O2 SATURATION: CPT

## 2022-06-27 PROCEDURE — 85014 HEMATOCRIT: CPT

## 2022-06-27 PROCEDURE — 84520 ASSAY OF UREA NITROGEN: CPT

## 2022-06-27 PROCEDURE — 84484 ASSAY OF TROPONIN QUANT: CPT

## 2022-06-27 PROCEDURE — 93005 ELECTROCARDIOGRAM TRACING: CPT | Performed by: STUDENT IN AN ORGANIZED HEALTH CARE EDUCATION/TRAINING PROGRAM

## 2022-06-27 PROCEDURE — 84145 PROCALCITONIN (PCT): CPT

## 2022-06-27 PROCEDURE — 85018 HEMOGLOBIN: CPT

## 2022-06-27 PROCEDURE — 85730 THROMBOPLASTIN TIME PARTIAL: CPT

## 2022-06-27 PROCEDURE — 99285 EMERGENCY DEPT VISIT HI MDM: CPT

## 2022-06-27 PROCEDURE — 80048 BASIC METABOLIC PNL TOTAL CA: CPT

## 2022-06-27 PROCEDURE — 82330 ASSAY OF CALCIUM: CPT

## 2022-06-27 PROCEDURE — 6370000000 HC RX 637 (ALT 250 FOR IP): Performed by: STUDENT IN AN ORGANIZED HEALTH CARE EDUCATION/TRAINING PROGRAM

## 2022-06-27 PROCEDURE — 94660 CPAP INITIATION&MGMT: CPT

## 2022-06-27 PROCEDURE — 70450 CT HEAD/BRAIN W/O DYE: CPT

## 2022-06-27 PROCEDURE — 82947 ASSAY GLUCOSE BLOOD QUANT: CPT

## 2022-06-27 PROCEDURE — 2000000000 HC ICU R&B

## 2022-06-27 PROCEDURE — 36556 INSERT NON-TUNNEL CV CATH: CPT

## 2022-06-27 PROCEDURE — 80076 HEPATIC FUNCTION PANEL: CPT

## 2022-06-27 RX ORDER — HEPARIN SODIUM AND DEXTROSE 10000; 5 [USP'U]/100ML; G/100ML
5-30 INJECTION INTRAVENOUS CONTINUOUS
Status: DISCONTINUED | OUTPATIENT
Start: 2022-06-27 | End: 2022-07-10

## 2022-06-27 RX ORDER — SODIUM CHLORIDE 0.9 % (FLUSH) 0.9 %
5-40 SYRINGE (ML) INJECTION PRN
Status: DISCONTINUED | OUTPATIENT
Start: 2022-06-27 | End: 2022-07-18 | Stop reason: HOSPADM

## 2022-06-27 RX ORDER — SODIUM CHLORIDE 0.9 % (FLUSH) 0.9 %
5-40 SYRINGE (ML) INJECTION EVERY 12 HOURS SCHEDULED
Status: DISCONTINUED | OUTPATIENT
Start: 2022-06-27 | End: 2022-07-12

## 2022-06-27 RX ORDER — POLYETHYLENE GLYCOL 3350 17 G/17G
17 POWDER, FOR SOLUTION ORAL DAILY PRN
Status: DISCONTINUED | OUTPATIENT
Start: 2022-06-27 | End: 2022-07-11

## 2022-06-27 RX ORDER — ONDANSETRON 2 MG/ML
4 INJECTION INTRAMUSCULAR; INTRAVENOUS EVERY 6 HOURS PRN
Status: DISCONTINUED | OUTPATIENT
Start: 2022-06-27 | End: 2022-07-18 | Stop reason: HOSPADM

## 2022-06-27 RX ORDER — DEXTROSE MONOHYDRATE 50 MG/ML
100 INJECTION, SOLUTION INTRAVENOUS PRN
Status: DISCONTINUED | OUTPATIENT
Start: 2022-06-27 | End: 2022-07-18 | Stop reason: HOSPADM

## 2022-06-27 RX ORDER — LANOLIN ALCOHOL/MO/W.PET/CERES
400 CREAM (GRAM) TOPICAL 2 TIMES DAILY
Status: DISCONTINUED | OUTPATIENT
Start: 2022-06-27 | End: 2022-07-18 | Stop reason: HOSPADM

## 2022-06-27 RX ORDER — BUMETANIDE 0.25 MG/ML
2 INJECTION, SOLUTION INTRAMUSCULAR; INTRAVENOUS ONCE
Status: COMPLETED | OUTPATIENT
Start: 2022-06-27 | End: 2022-06-27

## 2022-06-27 RX ORDER — DOPAMINE HYDROCHLORIDE 160 MG/100ML
1-20 INJECTION, SOLUTION INTRAVENOUS CONTINUOUS
Status: DISCONTINUED | OUTPATIENT
Start: 2022-06-27 | End: 2022-06-27

## 2022-06-27 RX ORDER — ACETAMINOPHEN 650 MG/1
650 SUPPOSITORY RECTAL EVERY 6 HOURS PRN
Status: DISCONTINUED | OUTPATIENT
Start: 2022-06-27 | End: 2022-07-18 | Stop reason: HOSPADM

## 2022-06-27 RX ORDER — LORAZEPAM 2 MG/ML
1 INJECTION INTRAMUSCULAR ONCE
Status: COMPLETED | OUTPATIENT
Start: 2022-06-28 | End: 2022-06-28

## 2022-06-27 RX ORDER — ATROPINE SULFATE 0.1 MG/ML
1 INJECTION INTRAVENOUS ONCE
Status: COMPLETED | OUTPATIENT
Start: 2022-06-27 | End: 2022-06-27

## 2022-06-27 RX ORDER — SODIUM CHLORIDE 9 MG/ML
INJECTION, SOLUTION INTRAVENOUS PRN
Status: DISCONTINUED | OUTPATIENT
Start: 2022-06-27 | End: 2022-07-18 | Stop reason: HOSPADM

## 2022-06-27 RX ORDER — HEPARIN SODIUM 1000 [USP'U]/ML
4000 INJECTION, SOLUTION INTRAVENOUS; SUBCUTANEOUS ONCE
Status: COMPLETED | OUTPATIENT
Start: 2022-06-27 | End: 2022-06-27

## 2022-06-27 RX ORDER — CALCIUM GLUCONATE 20 MG/ML
1000 INJECTION, SOLUTION INTRAVENOUS ONCE
Status: COMPLETED | OUTPATIENT
Start: 2022-06-27 | End: 2022-06-27

## 2022-06-27 RX ORDER — ACETAMINOPHEN 325 MG/1
650 TABLET ORAL EVERY 6 HOURS PRN
Status: DISCONTINUED | OUTPATIENT
Start: 2022-06-27 | End: 2022-07-18 | Stop reason: HOSPADM

## 2022-06-27 RX ORDER — EPINEPHRINE 0.1 MG/ML
1 SYRINGE (ML) INJECTION ONCE
Status: DISCONTINUED | OUTPATIENT
Start: 2022-06-27 | End: 2022-06-27

## 2022-06-27 RX ORDER — HEPARIN SODIUM 1000 [USP'U]/ML
2000 INJECTION, SOLUTION INTRAVENOUS; SUBCUTANEOUS PRN
Status: DISCONTINUED | OUTPATIENT
Start: 2022-06-27 | End: 2022-07-10

## 2022-06-27 RX ORDER — DOPAMINE HYDROCHLORIDE 160 MG/100ML
1-20 INJECTION, SOLUTION INTRAVENOUS CONTINUOUS
Status: DISCONTINUED | OUTPATIENT
Start: 2022-06-27 | End: 2022-06-29

## 2022-06-27 RX ORDER — ONDANSETRON 4 MG/1
4 TABLET, ORALLY DISINTEGRATING ORAL EVERY 8 HOURS PRN
Status: DISCONTINUED | OUTPATIENT
Start: 2022-06-27 | End: 2022-07-18 | Stop reason: HOSPADM

## 2022-06-27 RX ORDER — ATORVASTATIN CALCIUM 80 MG/1
40 TABLET, FILM COATED ORAL DAILY
Status: DISCONTINUED | OUTPATIENT
Start: 2022-06-27 | End: 2022-07-18 | Stop reason: HOSPADM

## 2022-06-27 RX ORDER — LANOLIN ALCOHOL/MO/W.PET/CERES
325 CREAM (GRAM) TOPICAL
Status: DISCONTINUED | OUTPATIENT
Start: 2022-06-28 | End: 2022-07-17

## 2022-06-27 RX ORDER — ALBUTEROL SULFATE 2.5 MG/3ML
2.5 SOLUTION RESPIRATORY (INHALATION) 3 TIMES DAILY
Status: DISCONTINUED | OUTPATIENT
Start: 2022-06-27 | End: 2022-06-28

## 2022-06-27 RX ORDER — BUDESONIDE AND FORMOTEROL FUMARATE DIHYDRATE 160; 4.5 UG/1; UG/1
2 AEROSOL RESPIRATORY (INHALATION) 2 TIMES DAILY
Status: DISCONTINUED | OUTPATIENT
Start: 2022-06-27 | End: 2022-06-27

## 2022-06-27 RX ORDER — ASPIRIN 81 MG/1
81 TABLET ORAL DAILY
Status: DISCONTINUED | OUTPATIENT
Start: 2022-06-27 | End: 2022-07-18 | Stop reason: HOSPADM

## 2022-06-27 RX ORDER — HEPARIN SODIUM 1000 [USP'U]/ML
4000 INJECTION, SOLUTION INTRAVENOUS; SUBCUTANEOUS PRN
Status: DISCONTINUED | OUTPATIENT
Start: 2022-06-27 | End: 2022-07-10

## 2022-06-27 RX ORDER — BUMETANIDE 0.25 MG/ML
0.5 INJECTION, SOLUTION INTRAMUSCULAR; INTRAVENOUS ONCE
Status: DISCONTINUED | OUTPATIENT
Start: 2022-06-27 | End: 2022-06-28

## 2022-06-27 RX ORDER — MORPHINE SULFATE 2 MG/ML
2 INJECTION, SOLUTION INTRAMUSCULAR; INTRAVENOUS EVERY 4 HOURS PRN
Status: DISCONTINUED | OUTPATIENT
Start: 2022-06-27 | End: 2022-07-18 | Stop reason: HOSPADM

## 2022-06-27 RX ORDER — LEVOFLOXACIN 5 MG/ML
750 INJECTION, SOLUTION INTRAVENOUS ONCE
Status: COMPLETED | OUTPATIENT
Start: 2022-06-27 | End: 2022-06-28

## 2022-06-27 RX ORDER — BUDESONIDE AND FORMOTEROL FUMARATE DIHYDRATE 160; 4.5 UG/1; UG/1
2 AEROSOL RESPIRATORY (INHALATION) 2 TIMES DAILY
Status: DISCONTINUED | OUTPATIENT
Start: 2022-06-27 | End: 2022-06-30

## 2022-06-27 RX ORDER — ALBUTEROL SULFATE 90 UG/1
2 AEROSOL, METERED RESPIRATORY (INHALATION) EVERY 6 HOURS PRN
Status: DISCONTINUED | OUTPATIENT
Start: 2022-06-27 | End: 2022-07-04

## 2022-06-27 RX ADMIN — ATROPINE SULFATE 1 MG: 0.1 INJECTION, SOLUTION INTRAVENOUS at 12:47

## 2022-06-27 RX ADMIN — CALCIUM GLUCONATE 1000 MG: 20 INJECTION, SOLUTION INTRAVENOUS at 13:10

## 2022-06-27 RX ADMIN — EPINEPHRINE 1 MCG/MIN: 1 INJECTION INTRAMUSCULAR; INTRAVENOUS; SUBCUTANEOUS at 19:51

## 2022-06-27 RX ADMIN — MORPHINE SULFATE 2 MG: 2 INJECTION, SOLUTION INTRAMUSCULAR; INTRAVENOUS at 20:46

## 2022-06-27 RX ADMIN — SODIUM ZIRCONIUM CYCLOSILICATE 10 G: 10 POWDER, FOR SUSPENSION ORAL at 22:19

## 2022-06-27 RX ADMIN — BUDESONIDE AND FORMOTEROL FUMARATE DIHYDRATE 2 PUFF: 160; 4.5 AEROSOL RESPIRATORY (INHALATION) at 20:03

## 2022-06-27 RX ADMIN — ANTI-FUNGAL POWDER MICONAZOLE NITRATE TALC FREE: 1.42 POWDER TOPICAL at 22:18

## 2022-06-27 RX ADMIN — ALBUTEROL SULFATE 2.5 MG: 2.5 SOLUTION RESPIRATORY (INHALATION) at 20:03

## 2022-06-27 RX ADMIN — HEPARIN SODIUM 12 UNITS/KG/HR: 10000 INJECTION, SOLUTION INTRAVENOUS at 20:14

## 2022-06-27 RX ADMIN — BUMETANIDE 2 MG: 0.25 INJECTION INTRAMUSCULAR; INTRAVENOUS at 21:37

## 2022-06-27 RX ADMIN — INSULIN HUMAN 10 UNITS: 100 INJECTION, SOLUTION PARENTERAL at 13:38

## 2022-06-27 RX ADMIN — DOPAMINE HYDROCHLORIDE 5 MCG/KG/MIN: 160 INJECTION, SOLUTION INTRAVENOUS at 17:03

## 2022-06-27 RX ADMIN — EPINEPHRINE 2 MCG/MIN: 1 INJECTION PARENTERAL at 13:08

## 2022-06-27 RX ADMIN — SODIUM CHLORIDE, PRESERVATIVE FREE 10 ML: 5 INJECTION INTRAVENOUS at 21:37

## 2022-06-27 RX ADMIN — DEXTROSE MONOHYDRATE 250 ML: 100 INJECTION, SOLUTION INTRAVENOUS at 13:37

## 2022-06-27 RX ADMIN — DOPAMINE HYDROCHLORIDE IN DEXTROSE 7.5 MCG/KG/MIN: 1.6 INJECTION, SOLUTION INTRAVENOUS at 18:25

## 2022-06-27 RX ADMIN — LEVOFLOXACIN 750 MG: 5 INJECTION, SOLUTION INTRAVENOUS at 15:00

## 2022-06-27 RX ADMIN — HEPARIN SODIUM 4000 UNITS: 1000 INJECTION INTRAVENOUS; SUBCUTANEOUS at 20:12

## 2022-06-27 ASSESSMENT — ENCOUNTER SYMPTOMS
SHORTNESS OF BREATH: 1
BACK PAIN: 0
COUGH: 0
ABDOMINAL PAIN: 0
SORE THROAT: 0
VOMITING: 0

## 2022-06-27 ASSESSMENT — PAIN SCALES - GENERAL
PAINLEVEL_OUTOF10: 10
PAINLEVEL_OUTOF10: 10

## 2022-06-27 NOTE — ED NOTES
Pt to room 14 via EMS with c/o SOB and bradycardia. EMS was called because pt was feeling SOB. When EMS arrived, they found pts heartrate to be in the 20s. Pt was given 2mg atropine with little change. EMS started to externally pace patient. Upon arrival, pt reports that she does not have history of bradycardia. Pt alert and oriented x4, talking in complete sentences, respirations even and unlabored. Pt acting age appropriate. White board updated, will continue to plan of care  Pt placed on continuous cardiac monitor, bp and pulse ox. EKG completed, IV established, blood work drawn.         Maylin Mendez, SHERITA  06/27/22 [id number]

## 2022-06-27 NOTE — PROGRESS NOTES
[facility]AD[facility], [facility] Bradycardia [R00.1] . Vitals:    06/27/22 1705   BP:    Pulse:    Resp: 18   Temp:    SpO2:    . Patients home meds are   Prior to Admission medications    Medication Sig Start Date End Date Taking?  Authorizing Provider   ferrous sulfate (IRON 325) 325 (65 Fe) MG tablet Take 1 tablet by mouth daily (with breakfast) 6/22/22   Justina Slater MD   apixaban (ELIQUIS) 2.5 MG TABS tablet Take 1 tablet by mouth 2 times daily 6/22/22   Justina Slater MD   apixaban (ELIQUIS) 2.5 MG TABS tablet Take 1 tablet by mouth 2 times daily 6/22/22   Justina Slater MD   ferrous sulfate (IRON 325) 325 (65 Fe) MG tablet Take 1 tablet by mouth daily (with breakfast) 6/22/22   Justina Slater MD   fluticasone-salmeterol (ADVAIR HFA) 115-21 MCG/ACT inhaler Inhale 2 puffs into the lungs 2 times daily 6/22/22   Justina Slater MD   metoprolol tartrate (LOPRESSOR) 25 MG tablet Take 1 tablet by mouth 2 times daily 6/17/22   Justina Slater MD   aspirin EC 81 MG EC tablet Take 1 tablet by mouth daily 6/17/22   Justina Slater MD   hydrALAZINE (APRESOLINE) 25 MG tablet Take 1 tablet by mouth 3 times daily 6/17/22   Justina Slater MD   albuterol sulfate HFA (PROAIR HFA) 108 (90 Base) MCG/ACT inhaler Inhale 2 puffs into the lungs every 6 hours as needed for Wheezing or Shortness of Breath (cough) 6/17/22   Justina Slater MD   metoprolol tartrate (LOPRESSOR) 25 MG tablet Take 1 tablet by mouth 2 times daily 6/17/22   Justina Slater MD   aspirin EC 81 MG EC tablet Take 1 tablet by mouth daily 6/17/22   Justina Slater MD   hydrALAZINE (APRESOLINE) 25 MG tablet Take 1 tablet by mouth 3 times daily 6/17/22   Justina Slater MD   albuterol sulfate HFA (PROAIR HFA) 108 (90 Base) MCG/ACT inhaler Inhale 2 puffs into the lungs every 6 hours as needed for Wheezing or Shortness of Breath (cough) 6/17/22   Justina Slater MD   fluticasone-salmeterol (ADVAIR HFA) 115-21 MCG/ACT inhaler Inhale 2 puffs into the lungs 2 times daily Maintenance inhaler 6/17/22   Jany Ramachandran MD   amLODIPine (NORVASC) 10 MG tablet Take 1 tablet by mouth nightly 6/11/22   Nitza Calderon MD   Blood Pressure KIT Diagnosis: HTN. Needs to check blood pressure 1-2 times a day until stable, then once a day. Goal blood pressure less than 135/85, and above 110/60. 5/26/22   Jany Ramachandran MD   vitamin D (ERGOCALCIFEROL) 1.25 MG (56347 UT) CAPS capsule Take 1 capsule by mouth once a week Sundays 4/28/22   Jany Ramachandran MD   amiodarone (CORDARONE) 200 MG tablet Take 1 tablet by mouth daily 4/15/22   Jany Ramachandran MD   Nebulizers (COMPRESSOR/NEBULIZER) MISC Nebulizer with compressor. Disposable Med Nebs 2 /month. Reusable Med Nebs 1 per 6 months. Aerosol Mask 1 per month. Replacement Filters 2 per month. All other related supplies as needed per month. Medications being used: Albuterol . 083 unit dose vial. Frequency: every 6 hrs x 4/day . Length of Need: 1  Patient not taking: Reported on 6/17/2022 2/22/22   Saturnino Diaz MD   albuterol (PROVENTIL) (2.5 MG/3ML) 0.083% nebulizer solution Take 3 mLs by nebulization every 6 hours as needed for Wheezing or Shortness of Breath  Patient not taking: Reported on 6/17/2022 2/22/22   Saturnino Diaz MD   [facility].  Devices (ADJUST FOLD CANE/YORK HANDLE) MISC Use daily for walking 2/22/22   Saturnino Diaz MD   Handicap Placard MISC by Does not apply route Expires on 12/30/25 2/22/22   Saturnino Diaz MD   atorvastatin (LIPITOR) 40 MG tablet Take 1 tablet by mouth once daily 2/15/22   Jany Ramachandran MD   desloratadine (CLARINEX) 5 MG tablet Take 1 tablet by mouth once daily 2/15/22   Jany Ramachandran MD   FreeStyle Lancets MISC 1 each by Does not apply route daily Patient needs to contact office before any further refills will be approved 2/15/22   Jany Ramachandran MD   magnesium oxide (MAG-OX) 400 (241.3 Mg) MG TABS tablet Take 1 tablet by mouth twice daily 2/15/22   Reji Almanza MD   miconazole (MICOTIN) 2 % powder Apply topically 2 times daily UNDER THE SKIN FOLDS LONG TERM 2/15/22   Reji Almanza MD   Multiple Vitamins-Minerals (THERAPEUTIC MULTIVITAMIN-MINERALS) tablet Take 1 tablet by mouth daily 2/15/22   Reji Almanza MD   blood glucose test strips (FREESTYLE LITE) strip 1 each by In Vitro route daily As needed.  3/19/21   Reji Almanza MD   Blood Pressure KIT 1 kit by Does not apply route three times daily  Patient not taking: Reported on 6/17/2022 12/19/19   Jonathon Musa MD   blood glucose monitor kit and supplies 1 kit by Other route three times daily Dispense Butterfly Elite CBG Device 8/20/19   Miguel Busch MD   .  Recent Surgical History: None = 0     Assessment     Peak Flow (asthma only)    Predicted: na  Personal Best: na  PEF na  % Predicted na  Peak Flow : not applicable = 0    EJS2/NGW    FEV1 Predicted na      FEV1 na    FEV1 % Predicted na  FVC na  IS volume na  IBW na    RR 18  Breath Sounds: clear      · Bronchodilator assessment at level  1  · Hyperinflation assessment at level   · Secretion Management assessment at level    ·   · []    Bronchodilator Assessment  BRONCHODILATOR ASSESSMENT SCORE  Score 0 1 2 3 4 5   Breath Sounds   []  Patient Baseline [x]  No Wheeze good aeration []  Faint, scattered wheezing, good aeration []  Expiratory Wheezing and or moderately diminished []  Insp/Exp wheeze and/or very diminished []  Insp/Exp and/ or marked distress   Respiratory Rate   []  Patient Baseline [x]  Less than 20 [x]  Less than 20 []  20-25 []  Greater than 25 []  Greater than 25   Peak flow % of Pred or PB [x]  NA   []  Greater than 90%  []  81-90% []  71-80% []  Less than or equal to 70%  or unable to perform []  Unable due to Respiratory Distress   Dyspnea re []  Patient Baseline []  No SOB []  No SOB [x]  SOB on exertion []  SOB min activity []  At rest/acute   e FEV% Predicted       [x]  NA []  Above 69%  []  Unable []  Above 60-69%  []  Unable []  Above 50-59%  []  Unable []  Above 35-49%  []  Unable []  Less than 35%  []  Unable                 []  Hyperinflation Assessment  Score 1 2 3   CXR and Breath Sounds   []  Clear []  No atelectasis  Basilar aeration []  Atelectasis or absent basilar breath sounds   Incentive Spirometry Volume  (Per IBW)   []  Greater than or equal to 15ml/Kg []  less than 15ml/Kg []  less than 15ml/Kg   Surgery within last 2 weeks []  None or general   []  Abdominal or thoracic surgery  []  Abdominal or thoracic   Chronic Pulmonary Historyre []  No []  Yes []  Yes     []  Secretion Management Assessment  Score 1 2 3   Bilateral Breath Sounds   []  Occasional Rhonchi []  Scattered Rhonchi []  Course Rhonchi and/or poor aeration   Sputum    []  Small amount of thin secretions []  Moderate amount of viscous secretions []  Copius, Viscious Yellow/ Secretions   CXR as reported by physician []  clear  []  Unavailable []  Infiltrates and/or consolidation  []  Unavailable []  Mucus Plugging and or lobar consolidation  []  Unavailable   Cough []  Strong, productive cough []  Weak productive cough []  No cough or weak non-productive cough   BENJAMIN GUARDADO, [facility]  5:08 PM                            FEMALE                                  MALE                            FEV1 Predicted Normal Values                        FEV1 Predicted Normal Values          Age                                     Height in Feet and Inches       Age                                     Height in Feet and Inches       4' 11\" 5' 1\" 5' 3\" 5' 5\" 5' 7\" 5' 9\" 5' 11\" 6' 1\"  4' 11\" 5' 1\" 5' 3\" 5' 5\" 5' 7\" 5' 9\" 5' 11\" 6' 1\"   42 - 45 2.49 2.66 2.84 3.03 3.22 3.42 3.62 3.83 42 - 45 2.82 3.03 3.26 3.49 3.72 3.96 4.22 4.47   46 - 49 2.40 2.57 2.76 2.94 3.14 3.33 3.54 3.75 46 - 49 2.70 2.92 3.14 3.37 3.61 3.85 4.10 4.36   50 - 53 2.31 2.48 2.66 2.85 3.04 3.24 3.45 3.66 50 - 53 2.58 2.80 3.02 3. 25 3.49 3.73 3.98 4.24   54 - 57 2.21 2.38 2.57 2.75 2.95 3.14 3.35 3.56 54 - 57 2.46 2.67 2.89 3.12 3.36 3.60 3.85 4.11   58 - 61 2.10 2.28 2.46 2.65 2.84 3.04 3.24 3.45 58 - 61 2.32 2.54 2.76 2.99 3.23 3.47 3.72 3.98   62 - 65 1.99 2.17 2.35 2.54 2.73 2.93 3.13 3.34 62 - 65 2.19 2.40 2.62 2.85 3.09 3.33 3.58 3.84   66 - 69 1.88 2.05 2.23 2.42 2.61 2.81 3.02 3.23 66 - 69 2.04 2.26 2.48 2.71 2.95 3.19 3.44 3.70   70+ 1.82 1.99 2.17 2.36 2.55 2.75 2.95 3.16 70+ 1.97 2.19 2.41 2.64 2.87 3.12 3.37 3.62             Predicted Peak Expiratory Flow Rate                                       Height (in)  Female       Height (in) Male           Age 64 63 56 61 58 73 78 74 Age            21 344 357 372 387 402 417 432 446  60 62 64 66 68 70 72 74 76   25 337 352 366 381 396 411 426 441 25 447 476 505 533 [phone] 677   30 329 344 359 374 389 404 419 434 30 437 466 494 523 552 580 609 638 667   35 322 337 351 366 381 396 411 426 35 426 455 484 512 541 570 598 627 657   40 314 329 344 359 374 389 404 419 40 416 445 473 502 531 559 588 617 647   45 307 322 336 351 366 381 396 411 45 405 434 463 491 520 549 577 606 636   50 299 314 329 344 359 374 389 404 50 395 424 452 481 510 538 567 596 625   55 292 307 321 336 351 366 381 396 55 384 413 442 470 499 528 556 585 615   60 284 299 314 329 344 359 374 389 60 374 403 431 460 489 517 546 575 605   65 277 292 306 321 336 351 366 381 65 363 392 421 449 478 507 535 564 594   70 269 284 299 314 329 344 359 374 70 353 382 410 439 468 496 525 554 583   75 261 274 289 305 319 334 348 364 75 344 372 400 429 458 487 515 544 573   80 253 266 282 296 312 327 342 356 80 335 362 390 419 448 476 [phone]

## 2022-06-27 NOTE — ED NOTES
20mg push dose epi given per Dr. Pierre Cobos, RN  06/27/22 33 Warren Ramiro Heck, SHERITA  06/27/22 5428

## 2022-06-27 NOTE — ED PROVIDER NOTES
[facility]     Emergency Department     Faculty Attestation    I performed a history and physical examination of the patient and discussed management with the resident. I reviewed the residents note and agree with the documented findings and plan of care. Any areas of disagreement are noted on the chart. I was personally present for the key portions of any procedures. I have documented in the chart those procedures where I was not present during the key portions. I have reviewed the emergency nurses triage note. I agree with the chief complaint, past medical history, past surgical history, allergies, medications, social and family history as documented unless otherwise noted below. For Physician Assistant/ Nurse Practitioner cases/documentation I have personally evaluated this patient and have completed at least one if not all key elements of the E/M (history, physical exam, and MDM). Additional findings are as noted. Called to bedside patient arriving by EMS with profound bradycardia requiring transcutaneous pacing. EMS was called for shortness of breath fatigue. Patient was found to have low sats and bradycardia unresponsive to atropine. Will start on triptans pain seen. On arrival awake alert oriented answering questions Tonya Reynolds making jokes with staff. Appears pale somewhat unkept but not disheveled. Lungs clear no respiratory distress speaking full sentences. Heart is profoundly bradycardic in the high 20s low 30s skin is cool pale but not cyanotic. Abdomen soft nontender. Lower extremities both have tight wrap hose with overlying duct tape. Symmetrical edema proximal to this bilaterally. Will continue supplemental oxygen push dose pressors pressure drip if needed, patient is awake with normal mental status sats have improved with supplemental oxygen do not feel needs transcutaneous pacing this time but will be prepared should that need arise.   We will proceed with full work-up, admit likely ICU. EKG interpretation: Sinus bradycardia 37. Normal intervals normal axis no acute ST or T changes. Abnormal EKG      Critical Care     CRITICAL CARE: There was a high probability of clinically significant/life threatening deterioration in this patient's condition which required my urgent intervention. Total critical care time was 15 minutes. This excludes any time for separately reportable procedures.        Venancio Baxter MD, [facility]  Attending Emergency  Physician             Venancio Baxter MD  06/27/22 1941

## 2022-06-27 NOTE — Clinical Note
Discharge Plan[de-identified] Other/[facility])   Telemetry/Cardiac Monitoring Required?: Yes

## 2022-06-27 NOTE — ED PROVIDER NOTES
[facility]  ED  Emergency Department Encounter  EmergencyMedicine Resident     Pt Mariano Aguilar  MRN: [id number]  [place] 1950  Date of evaluation: 6/27/22  PCP:  Brielle Sargent MD    This patient was evaluated in the Emergency Department for symptoms described in the history of present illness. The patient was evaluated in the context of the global COVID-19 pandemic, which necessitated consideration that the patient might be at risk for infection with the SARS-CoV-2 virus that causes COVID-19. Institutional protocols and algorithms that pertain to the evaluation of patients at risk for COVID-19 are in a state of rapid change based on information released by regulatory bodies including the CDC and federal and state organizations. These policies and algorithms were followed during the patient's care in the ED. CHIEF COMPLAINT       Chief Complaint   Patient presents with    Bradycardia       HISTORY OF PRESENT ILLNESS  (Location/Symptom, Timing/Onset, Context/Setting, Quality, Duration, Modifying Factors, Severity.)      Korin Hunter is a 66 y.o. female who presents with symptomatic bradycardia. Per EMS, patient was short of breath at home, with oxygen saturation low 90%, patient wears oxygen at home, however states that she cannot member how much. Patient is also more fatigued than usual.  Per EMS, patient had a LifeVest on her, however on measuring heart rate, was found to be 35, EMS started transcutaneous pacing on route, given 2 mg of atropine in total.  Patient has been alert and oriented x4 the whole time. Denies chest pain, reporting some chest pressure otherwise. Denies nausea, vomiting, abdominal pain. Patient is on Eliquis. Patient had a 48-hour Holter monitor on 6/13/22, showing normal sinus rhythm with some PACs. Echo in February 2022 showing EF of 55%.     PAST MEDICAL / SURGICAL / SOCIAL / FAMILY HISTORY      has a past medical history of Acute CVA (cerebrovascular accident) [facility]), Acute kidney injury superimposed on chronic kidney disease ([address] Acute on chronic combined systolic (congestive) and diastolic (congestive) heart failure ([address] JOY (acute kidney injury) ([address] Altered mental status, Anticoagulated, Arthritis, Asthma, Bradycardia, Brain mass, Cellulitis and abscess, Cellulitis and abscess of unspecified site, Cellulitis of both lower extremities, Cellulitis of left lower extremity, Cellulitis of lower extremity, CHF (congestive heart failure) ([address] Chronic kidney disease, unspecified, CKD stage 4 secondary to hypertension ([address] Coronary atherosclerosis of native coronary artery, Elevated LFTs, Essential hypertension, Essential hypertension, malignant, Hallucinations, Hard of hearing, Head contusion, Hypertensive emergency, Hypertensive urgency with joy, Hypokalemia, Hypomagnesemia, Iron deficiency anemia, unspecified, Meningioma ([address] Myocardial infarction ([address] Other and unspecified hyperlipidemia, Pure hypercholesterolemia, Stage 4 chronic kidney disease ([address] Toxic metabolic encephalopathy, Type 2 diabetes mellitus with stage 4 chronic kidney disease, without long-term current use of insulin ([address] Type II or unspecified type diabetes mellitus without mention of complication, not stated as uncontrolled, Unspecified asthma(493.90), Unspecified venous (peripheral) insufficiency, Unspecified vitamin D deficiency, and UTI (urinary tract infection). has a past surgical history that includes Tonsillectomy and adenoidectomy; Coronary artery bypass graft; intubation (3/7/2022); Thoracentesis (3/10/2022); Upper gastrointestinal endoscopy (N/A, 3/15/2022); and Colonoscopy (N/A, 3/15/2022). Social History     Socioeconomic History    Marital status:       Spouse name: Not on file    Number of children: 10    Years of education: Not on file    Highest education level: Not on file   Occupational History    Not on file   Tobacco Use    Smoking status: Former Smoker     Packs/day: 0.25     Years: 10.00     Pack years: 2.50     Quit date: 2000     Years since quittin.5    Smokeless tobacco: Never Used   Vaping Use    Vaping Use: Never used   Substance and Sexual Activity    Alcohol use: Not Currently     Alcohol/week: 0.0 standard drinks    Drug use: No    Sexual activity: Yes     Partners: Female   Other Topics Concern    Not on file   Social History Narrative    Not on file     Social Determinants of Health     Financial Resource Strain: Low Risk     Difficulty of Paying Living Expenses: Not very hard   Food Insecurity: No Food Insecurity    Worried About Running Out of Food in the Last Year: Never true    [address] [facility] [address] of Food in the Last Year: Never true   Transportation Needs:     Lack of Transportation (Medical): Not on file    Lack of Transportation (Non-Medical):  Not on file   Physical Activity:     Days of Exercise per Week: Not on file    Minutes of Exercise per Session: Not on file   Stress:     Feeling of Stress : Not on file   Social Connections:     Frequency of Communication with Friends and Family: Not on file    Frequency of Social Gatherings with Friends and Family: Not on file    Attends Roman [employer] Services: Not on file    Active Member of [address] [place] [address] [employer] or Organizations: Not on file    Attends Club or Organization Meetings: Not on file    Marital Status: Not on file   Intimate Partner Violence:     Fear of Current or Ex-Partner: Not on file    Emotionally Abused: Not on file    Physically Abused: Not on file    Sexually Abused: Not on file   Housing Stability:     Unable to Pay for Housing in the Last Year: Not on file    Number of [place] in the Last Year: Not on file    Unstable Housing in the Last Year: Not on file       Family History   Problem Relation Age of Onset    Diabetes Mother     Heart Disease Mother     Heart Disease Father     Diabetes Sister     High Blood Pressure Sister    Smith Mccurdynilda Diabetes Maternal Grandmother        Allergies:  Latex, Aleve [naproxen sodium], Pioglitazone, Claritin [loratadine], Keflex [cephalexin], and Lisinopril    Home Medications:  Prior to Admission medications    Medication Sig Start Date End Date Taking?  Authorizing Provider   ferrous sulfate (IRON 325) 325 (65 Fe) MG tablet Take 1 tablet by mouth daily (with breakfast) 6/22/22   Johnson Nagy MD   apixaban (ELIQUIS) 2.5 MG TABS tablet Take 1 tablet by mouth 2 times daily 6/22/22   Johnson Nagy MD   apixaban (ELIQUIS) 2.5 MG TABS tablet Take 1 tablet by mouth 2 times daily 6/22/22   Johnson Nagy MD   ferrous sulfate (IRON 325) 325 (65 Fe) MG tablet Take 1 tablet by mouth daily (with breakfast) 6/22/22   Johnson Nagy MD   fluticasone-salmeterol (ADVAIR HFA) 115-21 MCG/ACT inhaler Inhale 2 puffs into the lungs 2 times daily 6/22/22   Johnson Nagy MD   metoprolol tartrate (LOPRESSOR) 25 MG tablet Take 1 tablet by mouth 2 times daily 6/17/22   Johnson Nagy MD   aspirin EC 81 MG EC tablet Take 1 tablet by mouth daily 6/17/22   Johnson Nagy MD   hydrALAZINE (APRESOLINE) 25 MG tablet Take 1 tablet by mouth 3 times daily 6/17/22   Johnson Nagy MD   albuterol sulfate HFA (PROAIR HFA) 108 (90 Base) MCG/ACT inhaler Inhale 2 puffs into the lungs every 6 hours as needed for Wheezing or Shortness of Breath (cough) 6/17/22   Johnson Nagy MD   metoprolol tartrate (LOPRESSOR) 25 MG tablet Take 1 tablet by mouth 2 times daily 6/17/22   Johnson Nagy MD   aspirin EC 81 MG EC tablet Take 1 tablet by mouth daily 6/17/22   Johnson Nagy MD   hydrALAZINE (APRESOLINE) 25 MG tablet Take 1 tablet by mouth 3 times daily 6/17/22   Johnson Nagy MD   albuterol sulfate HFA (PROAIR HFA) 108 (90 Base) MCG/ACT inhaler Inhale 2 puffs into the lungs every 6 hours as needed for Wheezing or Shortness of Breath (cough) 6/17/22   Johnson Nagy MD   fluticasone-salmeterol (ADVAIR HFA) 115-21 MCG/ACT inhaler Inhale 2 puffs into the lungs 2 times daily Maintenance inhaler 6/17/22   Sera Welsh MD   amLODIPine (NORVASC) 10 MG tablet Take 1 tablet by mouth nightly 6/11/22   Idalia Willoughby MD   Blood Pressure KIT Diagnosis: HTN. Needs to check blood pressure 1-2 times a day until stable, then once a day. Goal blood pressure less than 135/85, and above 110/60. 5/26/22   Sera Welsh MD   vitamin D (ERGOCALCIFEROL) 1.25 MG (04161 UT) CAPS capsule Take 1 capsule by mouth once a week Sundays 4/28/22   Sera Welsh MD   amiodarone (CORDARONE) 200 MG tablet Take 1 tablet by mouth daily 4/15/22   Sera Welsh MD   Nebulizers (COMPRESSOR/NEBULIZER) MISC Nebulizer with compressor. Disposable Med Nebs 2 /month. Reusable Med Nebs 1 per 6 months. Aerosol Mask 1 per month. Replacement Filters 2 per month. All other related supplies as needed per month. Medications being used: Albuterol . 083 unit dose vial. Frequency: every 6 hrs x 4/day . Length of Need: 1  Patient not taking: Reported on 6/17/2022 2/22/22   Renetta Haines MD   albuterol (PROVENTIL) (2.5 MG/3ML) 0.083% nebulizer solution Take 3 mLs by nebulization every 6 hours as needed for Wheezing or Shortness of Breath  Patient not taking: Reported on 6/17/2022 2/22/22   Renetta Haines MD   [facility].  Devices (ADJUST FOLD CANE/YORK HANDLE) MISC Use daily for walking 2/22/22   Renetta Hanies MD   Handicap Placard MISC by Does not apply route Expires on 12/30/25 2/22/22   Renetta Haines MD   atorvastatin (LIPITOR) 40 MG tablet Take 1 tablet by mouth once daily 2/15/22   Sera Welsh MD   desloratadine (CLARINEX) 5 MG tablet Take 1 tablet by mouth once daily 2/15/22   Sera Welsh MD   FreeStyle Lancets MISC 1 each by Does not apply route daily Patient needs to contact office before any further refills will be approved 2/15/22   Sera Welsh MD   magnesium oxide (MAG-OX) 400 (241.3 Mg) MG TABS tablet Take 1 tablet by mouth twice daily 2/15/22   Jony Cadet MD   miconazole (MICOTIN) 2 % powder Apply topically 2 times daily UNDER THE SKIN FOLDS LONG TERM 2/15/22   Jony Cadet MD   Multiple Vitamins-Minerals (THERAPEUTIC MULTIVITAMIN-MINERALS) tablet Take 1 tablet by mouth daily 2/15/22   Jony Cadet MD   blood glucose test strips (FREESTYLE LITE) strip 1 each by In Vitro route daily As needed. 3/19/21   Jony Cadet MD   Blood Pressure KIT 1 kit by Does not apply route three times daily  Patient not taking: Reported on 6/17/2022 12/19/19   Kassie Bolaños MD   blood glucose monitor kit and supplies 1 kit by Other route three times daily Dispense Butterfly Elite CBG Device 8/20/19   Tu Wooten MD       REVIEW OF SYSTEMS    (2-9 systems for level 4, 10 or more for level 5)      Review of Systems   Constitutional: Positive for fatigue. Negative for chills and fever. HENT: Negative for sore throat. Eyes: Negative for visual disturbance. Respiratory: Positive for shortness of breath. Negative for cough. Cardiovascular: Negative for chest pain. Gastrointestinal: Negative for abdominal pain and vomiting. Endocrine: Negative for polyuria. Genitourinary: Negative for dysuria and hematuria. Musculoskeletal: Negative for back pain. Neurological: Positive for light-headedness. Negative for headaches. Psychiatric/Behavioral: Negative for confusion. PHYSICAL EXAM   (up to 7 for level 4, 8 or more for level 5)      INITIAL VITALS:   BP (!) 124/42   Pulse (!) 48   Temp (!) 95.6 °F (35.3 °C) (Rectal)   Resp 18   Wt 152 lb (68.9 kg)   SpO2 91%   BMI 30.70 kg/m²     Physical Exam  Constitutional:       Appearance: Normal appearance. She is obese. HENT:      Head: Normocephalic. Nose: Nose normal.      Mouth/Throat:      Mouth: Mucous membranes are moist.      Pharynx: Oropharynx is clear. Eyes:      Extraocular Movements: Extraocular movements intact. Pupils: Pupils are equal, round, and reactive to light. Cardiovascular:      Rate and Rhythm: Regular rhythm. Bradycardia present. Pulses: Normal pulses. Heart sounds: Normal heart sounds. Pulmonary:      Effort: Pulmonary effort is normal.      Breath sounds: Normal breath sounds. Abdominal:      Palpations: Abdomen is soft. Tenderness: There is no abdominal tenderness. There is no right CVA tenderness or left CVA tenderness. Musculoskeletal:      Right lower leg: Edema present. Left lower leg: Edema present. Skin:     General: Skin is warm. Capillary Refill: Capillary refill takes less than 2 seconds. Neurological:      General: No focal deficit present. Mental Status: She is alert and oriented to person, place, and time.          DIFFERENTIAL  DIAGNOSIS     PLAN (LABS / IMAGING / EKG):  Orders Placed This Encounter   Procedures    Culture, Blood 1    Culture, Blood 1    Culture, Urine    COVID-19, Rapid    XR CHEST PORTABLE    CT HEAD WO CONTRAST    CBC with Auto Differential    Basic Metabolic Panel    Troponin    Brain Natriuretic Peptide    TSH with Reflex    Lactate, Sepsis    Urinalysis with Microscopic    BLOOD GAS, VENOUS    ELECTROLYTES PLUS    Hemoglobin and hematocrit, blood    CALCIUM, IONIC (POC)    T4, Free    HYPOGLYCEMIA TREATMENT: blood glucose less than 50 mg/dL and patient  ALERT and TOLERATING PO    HYPOGLYCEMIA TREATMENT: blood glucose less than 70 mg/dL and patient ALERT and TOLERATING PO    HYPOGLYCEMIA TREATMENT: blood glucose less than 70 mg/dL and patient NOT ALERT or NPO    Telemetry monitoring - continuous duration    Inpatient consult to Cardiology    Inpatient consult to Critical Care    Venous Blood Gas, POC    Creatinine W/GFR Point of Care    POCT urea (BUN)    Lactic Acid, POC    POCT Glucose    POCT Glucose    POCT Glucose    POCT Glucose    POCT Glucose    EKG 12 Lead    ADMIT TO INPATIENT GROWTH <24 HRS    COVID-19, Rapid    Specimen: Nasopharyngeal Swab   Result Value Ref Range    Specimen Description . NASOPHARYNGEAL SWAB     SARS-CoV-2, Rapid Not Detected Not Detected   CBC with Auto Differential   Result Value Ref Range    WBC 10.4 3.5 - 11.3 k/uL    RBC 3.41 (L) 3.95 - 5.11 m/uL    Hemoglobin 10.5 (L) 11.9 - 15.1 g/dL    Hematocrit 35.2 (L) 36.3 - 47.1 %    .2 (H) 82.6 - 102.9 fL    MCH 30.8 25.2 - 33.5 pg    MCHC 29.8 28.4 - 34.8 g/dL    RDW 16.8 (H) 11.8 - 14.4 %    Platelets 745 354 - 987 k/uL    MPV 10.2 8.1 - 13.5 fL    NRBC Automated 0.0 0.0 per 100 WBC    Immature Granulocytes 1 (H) 0 %    Seg Neutrophils 90 (H) 36 - 66 %    Lymphocytes 2 (L) 24 - 44 %    Monocytes 5 1 - 7 %    Eosinophils % 1 1 - 4 %    Basophils 1 0 - 2 %    Absolute Immature Granulocyte 0.10 0.00 - 0.30 k/uL    Segs Absolute 9.37 (H) 1.8 - 7.7 k/uL    Absolute Lymph # 0.21 (L) 1.0 - 4.8 k/uL    Absolute Mono # 0.52 0.1 - 0.8 k/uL    Absolute Eos # 0.10 0.0 - 0.4 k/uL    Basophils Absolute 0.10 0.0 - 0.2 k/uL    Morphology ANISOCYTOSIS PRESENT     Morphology MACROCYTOSIS PRESENT    Basic Metabolic Panel   Result Value Ref Range    Glucose 170 (H) 70 - 99 mg/dL    BUN 64 (H) 8 - 23 mg/dL    CREATININE 2.13 (H) 0.50 - 0.90 mg/dL    Calcium 8.7 8.6 - 10.4 mg/dL    Sodium 140 135 - 144 mmol/L    Potassium 5.5 (H) 3.7 - 5.3 mmol/L    Chloride 108 (H) 98 - 107 mmol/L    CO2 21 20 - 31 mmol/L    Anion Gap 11 9 - 17 mmol/L    GFR Non-African American 23 (L) >60 mL/min    GFR  28 (L) >60 mL/min    GFR Comment         Troponin   Result Value Ref Range    Troponin, High Sensitivity 32 (H) 0 - 14 ng/L   Brain Natriuretic Peptide   Result Value Ref Range    Pro-BNP 7,757 (H) <300 pg/mL   TSH with Reflex   Result Value Ref Range    TSH 9.54 (H) 0.30 - 5.00 uIU/mL   Lactate, Sepsis   Result Value Ref Range    Lactic Acid, Sepsis, Whole Blood 1.9 0.5 - 1.9 mmol/L   BLOOD GAS, VENOUS   Result Value Ref Range    pH, Niraj 7.263 (L) 7.320 - 7.420    pCO2, Niraj 50.1 39 - 55    pO2, Niraj 42.4 30 - 50    HCO3, Venous 21.9 (L) 24 - 30 mmol/L    Negative Base Excess, Niraj 4.7 (H) 0.0 - 2.0 mmol/L    O2 Sat, Niraj 78.4 60.0 - 85.0 %    Carboxyhemoglobin 3.1 0 - 5 %    Pt Temp 37.0     FIO2 INFORMATION NOT PROVIDED    ELECTROLYTES PLUS   Result Value Ref Range    POC Sodium 141 138 - 146 mmol/L    POC Potassium 5.4 (H) 3.5 - 4.5 mmol/L    POC Chloride 111 (H) 98 - 107 mmol/L    POC TCO2 26 22 - 30 mmol/L    Anion Gap 5 (L) 7 - 16 mmol/L   Hemoglobin and hematocrit, blood   Result Value Ref Range    POC Hemoglobin 12.0 12.0 - 16.0 g/dL    POC Hematocrit 35 (L) 36 - 46 %   CALCIUM, IONIC (POC)   Result Value Ref Range    POC Ionized Calcium 1.27 1.15 - 1.33 mmol/L   T4, Free   Result Value Ref Range    Thyroxine, Free 0.93 0.93 - 1.70 ng/dL   Venous Blood Gas, POC   Result Value Ref Range    pH, Niraj 7.229 (L) 7.320 - 7.430    pCO2, Niraj 60.3 (H) 41.0 - 51.0 mm Hg    pO2, Niraj 31.9 30.0 - 50.0 mm Hg    HCO3, Venous 25.2 22.0 - 29.0 mmol/L    Negative Base Excess, Niraj 3 (H) 0.0 - 2.0    O2 Sat, Niraj 49 (L) 60.0 - 85.0 %   Creatinine W/GFR Point of Care   Result Value Ref Range    POC Creatinine 3.05 (H) 0.51 - 1.19 mg/dL    GFR Comment 18 (L) >60 mL/min    GFR Non-African American 15 (L) >60 mL/min    GFR Comment         POCT urea (BUN)   Result Value Ref Range    POC BUN 72 (H) 8 - 26 mg/dL   Lactic Acid, POC   Result Value Ref Range    POC Lactic Acid 1.41 (H) 0.56 - 1.39 mmol/L   POCT Glucose   Result Value Ref Range    POC Glucose 169 (H) 74 - 100 mg/dL       IMPRESSION: 71-year-old lady presents to the emergency department with symptomatic bradycardia, patient heart rate was 35, fatigued with shortness of breath however alert and oriented x4. Patient is on Eliquis and is wearing a LifeVest at home per EMS.   Patient was transcutaneously paced with 2 mg of atropine on route to the emergency department, on arrival to the emergency department, prior.     Unchanged cardiomegaly. [EM]   26 [facility]   No acute intracranial hemorrhage or hydrocephalus. Background mild chronic  small vessel white matter changes with remote lacunar insults in the right  caudate and left basal ganglia, unchanged from prior.     2.6 cm anterior right temporal meningioma with suggested mild increase in the  vasogenic edema along the anterior right temporal lobe relative to 4 months  prior. This could be further assessed with a contrast-enhanced brain MRI. [EM]      ED Course User Index  [EM] Corrine Small MD        No notes of EC Admission Criteria type on file. PROCEDURES:  None    CONSULTS:  IP CONSULT TO CARDIOLOGY  IP CONSULT TO CRITICAL CARE    FINAL IMPRESSION      1.  Bradycardia          DISPOSITION / PLAN     DISPOSITION        PATIENT REFERRED TO:  Sera Welsh MD  [address] [facility]  [address] [facility]  [address] [address]  [phone]    Schedule an appointment as soon as possible for a visit   For follow up    [facility]  [address] [facility] [address]  [phone]  Go to   As needed    [facility]  [address] [place] [id number]  [phone]  Schedule an appointment as soon as possible for a visit   For follow up      DISCHARGE MEDICATIONS:  New Prescriptions    No medications on file       Corrine Small MD  Emergency Medicine Resident    (Please note that portions of thisnote were completed with a voice recognition program.  Efforts were made to edit the dictations but occasionally words are mis-transcribed.)        Corrine Small MD  Resident  06/27/22 [id number]

## 2022-06-27 NOTE — PROCEDURES
PROCEDURE NOTE - CENTRAL VENOUS LINE PLACEMENT    PATIENT NAME: Abhijeet Mendes  MEDICAL RECORD NO. [id number]  DATE: 6/27/2022  ATTENDING PHYSICIAN: Dr. Joy Rob DIAGNOSIS:  centrally administered medications  POSTOPERATIVE DIAGNOSIS:  Same  PROCEDURE PERFORMED:  Right Femoral Vein Central Line Insertion  PERFORMING PHYSICIAN: Kathy Burns MD  ANESTHESIA:  Local utilizing 1% lidocaine  ESTIMATED BLOOD LOSS:  Less than 25 ml  COMPLICATIONS:  None immediately appreciated. DISCUSSION:  Abhijeet Mendes is a 66y.o.-year-old female who requires central IV access centrally administered medications. The history and physical examination were reviewed and confirmed. CONSENT: The patient provided verbal consent for this procedure. PROCEDURE:  A timeout was initiated by the bedside nurse and was confirmed by those present. The patient was placed in a supine position. The skin overlying the Right Femoral Vein was prepped with chlorhexadine and draped in sterile fashion. The skin was infiltrated with local anesthetic. The vessel and surrounding anatomy was visualized using ultrasound. Through the anesthetized region, the introducer needle was inserted into the femoral vein returning dark red non pulsatile blood. A guidewire was placed through the center of the needle with no resistance. Ultrasound confirmed presence of wire in the vein. A small incision made in the skin with a #11 scalpel blade. The dilator was inserted into the skin and vein over guidewire using Seldinger technique. The dilator was then removed and the 7F 20cm catheter was placed in the vein over the guidewire using Seldinger technique. The guidewire was then removed and all ports aspirated and flushed appropriately. The catheter then secured using silk suture and a temporary sterile dressing was applied. No immediate complication was evident.   All sponge, instrument and needle counts were correct at the completion of the procedure. Chest x-ray not indicated for femoral central line. Placement confirmed via ultrasound.       Nikunj Burt MD  6:00 PM, 6/27/22

## 2022-06-27 NOTE — PROGRESS NOTES
[facility]  Documentation Note                Admission Dx: Bradycardia [R00.1]    Past Medical History:   has a past medical history of Acute CVA (cerebrovascular accident) ([facility] [address] Acute kidney injury superimposed on chronic kidney disease ([facility] [address] Acute on chronic combined systolic (congestive) and diastolic (congestive) heart failure ([address] JOY (acute kidney injury) ([facility] [address] Altered mental status, Anticoagulated, Arthritis, Asthma, Bradycardia, Brain mass, Cellulitis and abscess, Cellulitis and abscess of unspecified site, Cellulitis of both lower extremities, Cellulitis of left lower extremity, Cellulitis of lower extremity, CHF (congestive heart failure) ([facility] [address] Chronic kidney disease, unspecified, CKD stage 4 secondary to hypertension ([facility] [address] Coronary atherosclerosis of native coronary artery, Elevated LFTs, Essential hypertension, Essential hypertension, malignant, Hallucinations, Hard of hearing, Head contusion, Hypertensive emergency, Hypertensive urgency with joy, Hypokalemia, Hypomagnesemia, Iron deficiency anemia, unspecified, Meningioma ([address] Myocardial infarction ([address] Other and unspecified hyperlipidemia, Pure hypercholesterolemia, Stage 4 chronic kidney disease ([facility] [address] Toxic metabolic encephalopathy, Type 2 diabetes mellitus with stage 4 chronic kidney disease, without long-term current use of insulin ([facility] [address] Type II or unspecified type diabetes mellitus without mention of complication, not stated as uncontrolled, Unspecified asthma(493.90), Unspecified venous (peripheral) insufficiency, Unspecified vitamin D deficiency, and UTI (urinary tract infection). Previous Testing:     HOLTER 6/14/2022:   1. Predominantly NSR   2. PACs with 4 short runs of SVT/PAT (longest 11 beats)  3. No AFib/AFlutter     ECHO 3/14/2022: EF 60-65%, no regurg/stenosis     KARLA/CV 7/30/2020: EF 55%, no thrombus/veg, moderate AI, mild MR/TR. Successful CV to NSR.      CATH 7/21/06: Patent two grafts with occluded SVG-OM. EF 60%. CABG 2003: LIMA-LAD, SVG-OM, SVG-RCA     Previous office/hospital visit:   Maryjane Hummel NP 6/14/2022:   1. Sinus bradycardia likely due to amiodarone and metoprolol. 2. CABG with LIMA-LAD, SVG-OM and SVG-RCA in 2003. Occluded SVG-OM2 by heart catheterization in 2006. 3. LV dysfunction, resolved, echo march 2022 EF 60-65%    4. Hyperlipidemia. 5. Diabetes. function 64% and no ischemia. 6. 7/28/20 [address] w/ Celllulitis/sepsis. Afib RVR.  S/p cardioversion 7/2020    Plan --   1. Bradycardia. NSR, HR stable now, 70's. Will start low dose BB today. 2. Continue eliquis, bumex, asa and statin   3. Holter monitor on discharge. 4. [facility] for discharge from cardio standpoint.       [facility]

## 2022-06-27 NOTE — H&P
Critical Care - History and Physical Examination    Patient's name:  Juana Forde  Medical Record Number: [id number]  Patient's account/billing number: [de-identified]  Patient's YOB: 1950  Age: 66 y.o. Date of Admission: 6/27/2022 12:28 PM  Date of History and Physical Examination: 6/27/2022    Primary Care Physician: Martine Mancia MD  Attending Physician: Dr. Dennis Hanley Status: Prior    Chief complaint:   Chief Complaint   Patient presents with    Bradycardia     HISTORY OF PRESENT ILLNESS:      Ms. Jah Person is a 53-year-old female with past medical history of chronic diastolic heart failure, COPD recently stated on home oxygen (quantity unknown), atrial fibrillation on amiodarone and Eliquis at home, type 2 diabetes mellitus not on any home meds due to recent A1c of 4.6 on March 31, 2022, and bradycardia status post Holter monitor on June 13 of this year which was suggestive of normal sinus rhythm with PACs and 4 short runs of SVT/paroxysmal atrial tachycardia. Patient was brought by EMS today as she was feeling short of breath at home with an oxygen saturation of 90%; additionally she had complaints of feeling more fatigue than usual.  Patient was found to have a heart rate as low as 35 and was started on transcutaneous pacing and route and was given a total of 2 mg atropine. Patient was most recently discharged from [facility] this month for similar episodes of bradycardia. Cardiology evaluated during that admission and attributed the bradycardia to medication and adjusted her metoprolol. Additionally she was discharged with a Holter monitor with results suggestive of normal sinus with some PACs. Patient is on Eliquis at home for atrial fibrillation and most recent echo was done in February 2022 with an EF of 55%.     PAST MEDICAL HISTORY:         Diagnosis Date    Acute CVA (cerebrovascular accident) ([address] 7/25/2020    Acute kidney injury superimposed on chronic kidney disease ([facility] 6/8/2022    Acute on chronic combined systolic (congestive) and diastolic (congestive) heart failure ([address] 2/6/2022    JOY (acute kidney injury) ([facility] 1/15/2022    Altered mental status 5/26/2021    Anticoagulated 6/8/2022    Arthritis     Asthma     Bradycardia 6/12/2022    Brain mass     Cellulitis and abscess     Cellulitis and abscess of unspecified site     Cellulitis of both lower extremities 5/26/2021    Cellulitis of left lower extremity 7/26/2020    Cellulitis of lower extremity 10/4/2020    CHF (congestive heart failure) (HCC)     Chronic kidney disease, unspecified     CKD stage 4 secondary to hypertension ([facility] 2/20/2020    Coronary atherosclerosis of native coronary artery     Elevated LFTs 2/25/2021    Essential hypertension 7/25/2020    Essential hypertension, malignant     Hallucinations 2/18/2021    Hard of hearing     Head contusion 9/9/2020    Hypertensive emergency 7/25/2020    Hypertensive urgency with joy 6/9/2022    Hypokalemia 9/9/2020    Hypomagnesemia 5/26/2021    Iron deficiency anemia, unspecified     Meningioma ([facility] 2/25/2021    Myocardial infarction ([facility]     Other and unspecified hyperlipidemia     Pure hypercholesterolemia     Stage 4 chronic kidney disease ([facility]     Toxic metabolic encephalopathy 9/23/6752    Type 2 diabetes mellitus with stage 4 chronic kidney disease, without long-term current use of insulin (HCC)     Type II or unspecified type diabetes mellitus without mention of complication, not stated as uncontrolled     Unspecified asthma(493.90)     Unspecified venous (peripheral) insufficiency     Unspecified vitamin D deficiency     UTI (urinary tract infection) 2/18/2021     PAST SURGICAL HISTORY:         Procedure Laterality Date    COLONOSCOPY N/A 3/15/2022    COLONOSCOPY DIAGNOSTIC performed by Lidia Malcolm MD at [facility].      x 3, Dr. America Snowden 3/7/2022         THORACENTESIS  3/10/2022         TONSILLECTOMY AND ADENOIDECTOMY      UPPER GASTROINTESTINAL ENDOSCOPY N/A 3/15/2022    EGD BIOPSY performed by Zunilda Mcnair MD at [address] [place] [address] Blvd:      Allergies   Allergen Reactions    Latex Rash    Aleve [Naproxen Sodium]      Chronic kidney disease stage III, CHF    Pioglitazone Other (See Comments)     Congestive heart failure    Claritin [Loratadine]     Keflex [Cephalexin]     Lisinopril      Needs clarification of contraindication       HOME MEDS: :      Prior to Admission medications    Medication Sig Start Date End Date Taking?  Authorizing Provider   ferrous sulfate (IRON 325) 325 (65 Fe) MG tablet Take 1 tablet by mouth daily (with breakfast) 6/22/22   Juvenal Thompson MD   apixaban (ELIQUIS) 2.5 MG TABS tablet Take 1 tablet by mouth 2 times daily 6/22/22   Juvenal Thompson MD   apixaban (ELIQUIS) 2.5 MG TABS tablet Take 1 tablet by mouth 2 times daily 6/22/22   Juvenal Thompson MD   ferrous sulfate (IRON 325) 325 (65 Fe) MG tablet Take 1 tablet by mouth daily (with breakfast) 6/22/22   Juvenal Thompson MD   fluticasone-salmeterol (ADVAIR HFA) 115-21 MCG/ACT inhaler Inhale 2 puffs into the lungs 2 times daily 6/22/22   Juvenal Thompson MD   metoprolol tartrate (LOPRESSOR) 25 MG tablet Take 1 tablet by mouth 2 times daily 6/17/22   Juvenal Thompson MD   aspirin EC 81 MG EC tablet Take 1 tablet by mouth daily 6/17/22   Juvenal Thompson MD   hydrALAZINE (APRESOLINE) 25 MG tablet Take 1 tablet by mouth 3 times daily 6/17/22   Juvenal Thompson MD   albuterol sulfate HFA (PROAIR HFA) 108 (90 Base) MCG/ACT inhaler Inhale 2 puffs into the lungs every 6 hours as needed for Wheezing or Shortness of Breath (cough) 6/17/22   Juvenal Thompson MD   metoprolol tartrate (LOPRESSOR) 25 MG tablet Take 1 tablet by mouth 2 times daily 6/17/22   Juvenal Thompson MD   aspirin EC 81 MG EC tablet Take 1 tablet by mouth daily 6/17/22 Jony Cadet MD   hydrALAZINE (APRESOLINE) 25 MG tablet Take 1 tablet by mouth 3 times daily 6/17/22   Jony Cadet MD   albuterol sulfate HFA (PROAIR HFA) 108 (90 Base) MCG/ACT inhaler Inhale 2 puffs into the lungs every 6 hours as needed for Wheezing or Shortness of Breath (cough) 6/17/22   Jony Cadet MD   fluticasone-salmeterol (ADVAIR HFA) 452-31 MCG/ACT inhaler Inhale 2 puffs into the lungs 2 times daily Maintenance inhaler 6/17/22   Jony Cadet MD   amLODIPine (NORVASC) 10 MG tablet Take 1 tablet by mouth nightly 6/11/22   Belgica Robbins MD   Blood Pressure KIT Diagnosis: HTN. Needs to check blood pressure 1-2 times a day until stable, then once a day. Goal blood pressure less than 135/85, and above 110/60. 5/26/22   Jony Cadet MD   vitamin D (ERGOCALCIFEROL) 1.25 MG (15010 UT) CAPS capsule Take 1 capsule by mouth once a week Sundays 4/28/22   Jony Cadet MD   amiodarone (CORDARONE) 200 MG tablet Take 1 tablet by mouth daily 4/15/22   Jony Cadet MD   Nebulizers (COMPRESSOR/NEBULIZER) MISC Nebulizer with compressor. Disposable Med Nebs 2 /month. Reusable Med Nebs 1 per 6 months. Aerosol Mask 1 per month. Replacement Filters 2 per month. All other related supplies as needed per month. Medications being used: Albuterol . 083 unit dose vial. Frequency: every 6 hrs x 4/day . Length of Need: 1  Patient not taking: Reported on 6/17/2022 2/22/22   Mendel Gola, MD   albuterol (PROVENTIL) (2.5 MG/3ML) 0.083% nebulizer solution Take 3 mLs by nebulization every 6 hours as needed for Wheezing or Shortness of Breath  Patient not taking: Reported on 6/17/2022 2/22/22   Mendel Gola, MD   Misc.  Devices (ADJUST FOLD CANE/YORK HANDLE) MISC Use daily for walking 2/22/22   Mendel Gola, MD   Handicap Placard MISC by Does not apply route Expires on 12/30/25 2/22/22   Mendel Gola, MD   atorvastatin (LIPITOR) 40 MG tablet Take 1 tablet by mouth once daily 2/15/22 Sandi Diamond MD   desloratadine (CLARINEX) 5 MG tablet Take 1 tablet by mouth once daily 2/15/22   Sandi Diamond MD   FreeStyle Lancets MISC 1 each by Does not apply route daily Patient needs to contact office before any further refills will be approved 2/15/22   Sandi Diamond MD   magnesium oxide (MAG-OX) 400 (241.3 Mg) MG TABS tablet Take 1 tablet by mouth twice daily 2/15/22   Sandi Diamond MD   miconazole (MICOTIN) 2 % powder Apply topically 2 times daily UNDER THE SKIN FOLDS LONG TERM 2/15/22   Sandi Diamond MD   Multiple Vitamins-Minerals (THERAPEUTIC MULTIVITAMIN-MINERALS) tablet Take 1 tablet by mouth daily 2/15/22   Sandi Diamond MD   blood glucose test strips (FREESTYLE LITE) strip 1 each by In Vitro route daily As needed. 3/19/21   Sandi Diamond MD   Blood Pressure KIT 1 kit by Does not apply route three times daily  Patient not taking: Reported on 6/17/2022 12/19/19   Caitlin Sol MD   blood glucose monitor kit and supplies 1 kit by Other route three times daily Dispense Butterfly Elite CBG Device 8/20/19   Edenilson Oneal MD     SOCIAL HISTORY:       TOBACCO:   reports that she quit smoking about 22 years ago. She has a 2.50 pack-year smoking history. She has never used smokeless tobacco.  ETOH:   reports previous alcohol use. DRUGS:  reports no history of drug use. FAMILY HISTORY:          Problem Relation Age of Onset    Diabetes Mother     Heart Disease Mother     Heart Disease Father     Diabetes Sister     High Blood Pressure Sister     Diabetes Maternal Grandmother      REVIEW OF SYSTEMS (ROS):     Constitutional: Positive for fatigue. Negative for chills and fever. HENT: Negative for sore throat. Eyes: Negative for visual disturbance. Respiratory: Positive for shortness of breath. Negative for cough. Cardiovascular: Negative for chest pain. Gastrointestinal: Negative for abdominal pain and vomiting.    Endocrine: Negative for polyuria. Genitourinary: Negative for dysuria and hematuria. Musculoskeletal: Negative for back pain. Neurological: Positive for light-headedness. Negative for headaches. Psychiatric/Behavioral: Negative for confusion. OBJECTIVE:     VITAL SIGNS:  BP (!) 113/38   Pulse (!) 44   Temp (!) 95.6 °F (35.3 °C) (Rectal)   Resp 18   Wt 152 lb (68.9 kg)   SpO2 90%   BMI 30.70 kg/m²   Tmax over 24 hours:  Temp (24hrs), Av.6 °F (35.3 °C), Min:95.6 °F (35.3 °C), Max:95.6 °F (35.3 °C)    Patient Vitals for the past 8 hrs:   BP Temp Temp src Pulse Resp SpO2 Weight   22 1532 (!) 113/38 -- -- (!) 44 -- 90 % --   22 1517 110/60 -- -- (!) 47 -- 90 % --   22 1413 -- -- -- (!) 46 -- 92 % --   22 1402 (!) 124/42 -- -- (!) 48 -- 91 % --   22 1347 (!) 121/57 -- -- (!) 43 -- 91 % --   22 1342 -- -- -- (!) 44 -- 92 % --   22 1330 (!) 107/48 -- -- (!) 41 -- 91 % --   22 1257 (!) 117/52 -- -- (!) 36 -- 93 % --   22 1245 -- (!) 95.6 °F (35.3 °C) Rectal -- -- -- --   22 1239 -- -- -- (!) 47 -- -- --   22 1237 -- -- -- -- 18 -- --   22 1235 (!) 126/47 -- -- -- -- -- --   22 1234 -- -- -- -- -- 90 % --   22 1232 -- -- -- (!) 36 -- (!) 87 % 152 lb (68.9 kg)     No intake or output data in the 24 hours ending 22 1613    Wt Readings from Last 3 Encounters:   22 152 lb (68.9 kg)   22 152 lb (68.9 kg)   22 152 lb (68.9 kg)     Body mass index is 30.7 kg/m². PHYSICAL EXAM:  Constitutional:       Appearance: Normal appearance. She is obese. HENT:      Head: Normocephalic. Nose: Nose normal.      Mouth/Throat:      Mouth: Mucous membranes are moist.      Pharynx: Oropharynx is clear. Eyes:      Extraocular Movements: Extraocular movements intact. Pupils: Pupils are equal, round, and reactive to light. Cardiovascular:      Rate and Rhythm: Regular rhythm. Bradycardia present. Pulses: Normal pulses. Heart sounds: Normal heart sounds. Pulmonary:      Effort: Pulmonary effort is normal.      Breath sounds: Normal breath sounds. Abdominal:      Palpations: Abdomen is soft. Tenderness: There is no abdominal tenderness. There is no right CVA tenderness or left CVA tenderness. Musculoskeletal:      Right lower leg: Edema present. Left lower leg: Edema present. Skin:     General: Skin is warm. Capillary Refill: Capillary refill takes less than 2 seconds. Neurological:      General: No focal deficit present. Mental Status: She is alert and oriented to person, place, and time.      MEDICATIONS:  Scheduled Meds:   levofloxacin  750 mg IntraVENous Once    aspirin EC  81 mg Oral Daily    atorvastatin  40 mg Oral Daily    magnesium oxide  400 mg Oral BID    miconazole   Topical BID    [START ON 6/28/2022] ferrous sulfate  325 mg Oral Daily with breakfast    bumetanide  0.5 mg IntraVENous Once    budesonide-formoterol  2 puff Inhalation BID     Continuous Infusions:   dextrose       PRN Meds:   glucose, 4 tablet, PRN  dextrose bolus, 125 mL, PRN   Or  dextrose bolus, 250 mL, PRN  glucagon (rDNA), 1 mg, PRN  dextrose, 100 mL/hr, PRN  albuterol sulfate HFA, 2 puff, Q6H PRN      ABGs:   Lab Results   Component Value Date    PHART 7.459 03/09/2022    OUH8ARM 39.9 03/09/2022    PO2ART 107.0 03/09/2022    XKE6CSF 28.3 03/09/2022    U0ERLQSH 97.4 03/09/2022    FIO2 INFORMATION NOT PROVIDED 06/27/2022     DATA:  Complete Blood Count:   Recent Labs     06/27/22  1244   WBC 10.4   RBC 3.41*   HGB 10.5*   HCT 35.2*   .2*   MCH 30.8   MCHC 29.8   RDW 16.8*      MPV 10.2      Last 3 Blood Glucose:   Recent Labs     06/27/22  1244   GLUCOSE 170*      PT/INR:    Lab Results   Component Value Date    PROTIME 10.8 06/14/2022    INR 1.0 06/14/2022     PTT:    Lab Results   Component Value Date    APTT 31.6 06/12/2022     Comprehensive Metabolic Profile:   Recent Labs     06/27/22  1235 06/27/22  1244   NA  --  140   K  --  5.5*   CL  --  108*   CO2  --  21   BUN  --  64*   CREATININE 3.05* 2.13*   GLUCOSE  --  170*   CALCIUM  --  8.7      Magnesium:   Lab Results   Component Value Date    MG 2.3 06/09/2022    MG 2.4 06/07/2022    MG 2.3 05/18/2022     Phosphorus:   Lab Results   Component Value Date    PHOS 4.4 06/15/2022    PHOS 4.6 05/18/2022    PHOS 4.5 04/28/2022     Ionized Calcium: No results found for: TERRY     Urinalysis:   Lab Results   Component Value Date    NITRU NEGATIVE 06/12/2022    COLORU Yellow 06/12/2022    PHUR 5.0 06/12/2022    WBCUA 51  06/12/2022    RBCUA 21 TO 50 06/12/2022    MUCUS NOT REPORTED 02/07/2022    TRICHOMONAS NOT REPORTED 02/07/2022    YEAST NOT REPORTED 02/07/2022    BACTERIA FEW 06/12/2022    SPECGRAV 1.020 06/12/2022    LEUKOCYTESUR SMALL 06/12/2022    UROBILINOGEN Normal 06/12/2022    BILIRUBINUR NEGATIVE 06/12/2022    GLUCOSEU SMALL 06/12/2022    KETUA NEGATIVE 06/12/2022    AMORPHOUS NOT REPORTED 02/07/2022     HgBA1c:    Lab Results   Component Value Date    LABA1C 4.6 03/31/2022     TSH:    Lab Results   Component Value Date    TSH 9.54 06/27/2022     Lactic Acid:   Lab Results   Component Value Date    LACTA 0.7 03/04/2022    LACTA 1.2 02/18/2021    LACTA 1.8 07/25/2020      Troponin: No results for input(s): TROPONINI in the last 72 hours. Radiological imaging  XR CHEST (2 VW)    Result Date: 6/7/2022  EXAMINATION: TWO XRAY VIEWS OF THE CHEST 6/7/2022 9:38 pm COMPARISON: Chest 05/18/2022 HISTORY: ORDERING SYSTEM PROVIDED HISTORY: eval pulm edema TECHNOLOGIST PROVIDED HISTORY: eval pulm edema Reason for Exam: PT CO mild cough with SOB and fatigue X several days. FINDINGS: The cardiac silhouette is enlarged. Calcifications involving the aorta reflect atherosclerosis. The mediastinal and hilar silhouettes appear unremarkable. Chronic appearing coarse interstitial densities predominantly parahilar regions and lower lungs.   Chronic appearing coarse interstitial densities predominate perihilar regions and lung bases, typical of sequela from smoking or other previous infectious/inflammatory process. No dense consolidation or pleural effusion evident. No pneumothorax is seen. No acute osseous abnormality is identified. Sequela from prior median sternotomy. Hyperkyphosis and barrel chest configuration. Bones appear osteoporotic. 1. No definite acute pulmonary disease. 2.  Pulmonary sequela typical of that seen with smoking, including possible COPD; correlate with clinical history. 3. Calcific atherosclerosis aorta. 4. Cardiomegaly. CT HEAD WO CONTRAST    Result Date: 6/27/2022  EXAMINATION: CT OF THE HEAD WITHOUT CONTRAST  6/27/2022 10:20 am TECHNIQUE: CT of the head was performed without the administration of intravenous contrast. Automated exposure control, iterative reconstruction, and/or weight based adjustment of the mA/kV was utilized to reduce the radiation dose to as low as reasonably achievable. COMPARISON: Head CT 03/07/2022, 02/17/2021 HISTORY: ORDERING SYSTEM PROVIDED HISTORY: AMS TECHNOLOGIST PROVIDED HISTORY: AMS Decision Support Exception - unselect if not a suspected or confirmed emergency medical condition->Emergency Medical Condition (MA) Reason for Exam: ams FINDINGS: BRAIN/VENTRICLES: Examination is mildly motion degraded. There is no acute intracranial hemorrhage or midline shift. No abnormal extra-axial fluid collection with an unchanged 2.5 cm transverse by 2.6 cm AP extra-axial mass in the anterior right temporal region compatible with a meningioma with some peripheral rim mineralization. There is mass effect on the anterior right temporal lobe with suggestion of increased surrounding edema. Partial empty sella, unchanged from prior. Encephalomalacia from small remote insults in the right caudate and left basal ganglia region. The gray-white differentiation is otherwise maintained.   Parenchymal atrophy which is greatest in frontal lobes. There is no evidence of hydrocephalus. ORBITS: The visualized portion of the orbits demonstrate no acute abnormality. SINUSES: The visualized paranasal sinuses and mastoid air cells demonstrate no acute abnormality. SOFT TISSUES/SKULL:  No acute abnormality of the visualized skull or soft tissues. No acute intracranial hemorrhage or hydrocephalus. Background mild chronic small vessel white matter changes with remote lacunar insults in the right caudate and left basal ganglia, unchanged from prior. 2.6 cm anterior right temporal meningioma with suggested mild increase in the vasogenic edema along the anterior right temporal lobe relative to 4 months prior. This could be further assessed with a contrast-enhanced brain MRI. XR CHEST PORTABLE    Result Date: 6/27/2022  EXAMINATION: ONE XRAY VIEW OF THE CHEST 6/27/2022 9:57 am COMPARISON: 06/12/2022, 06/07/2022, 05/18/2022 HISTORY: ORDERING SYSTEM PROVIDED HISTORY: Bradycardia TECHNOLOGIST PROVIDED HISTORY: Bradycardia FINDINGS: There are mixed interstitial and alveolar opacities throughout both lungs with bilateral pleural effusions. Cardiomegaly with prior CABG. Diffuse osseous demineralization which limits radiographic assessment of the bones. Mixed interstitial and alveolar opacities throughout both lungs, increased from 3 weeks prior. Correlate for edema or contributory infection. Bilateral small left greater than right pleural effusions which are also increased from 3 weeks prior. Unchanged cardiomegaly. XR CHEST PORTABLE    Result Date: 6/12/2022  EXAMINATION: ONE XRAY VIEW OF THE CHEST 6/12/2022 3:49 pm COMPARISON: 06/07/2022 HISTORY: ORDERING SYSTEM PROVIDED HISTORY: bradycardia TECHNOLOGIST PROVIDED HISTORY: bradycardia Reason for Exam: SOB. Pt. states history of asthma FINDINGS: Evidence of prior CABG with median sternotomy wires and mediastinal clips. Stable mild cardiomegaly.   Increasing small-moderate bilateral pleural effusions and bibasilar atelectasis. No pneumothorax. Increasing small-moderate pleural effusions and bibasilar atelectasis. ASSESSMENT:     Principal Problem:    Bradycardia  Resolved Problems:    * No resolved hospital problems. *    PLAN:     PLAN/MEDICAL DECISION MAKING:  Neurologic:   · Neuro checks per protocol  · Unknown baseline status  · CT of head performed in ED. Suggestive of 2.6 cm anterior right temporal meningioma with mild increase in vasogenic edema  · Previous CT's reviewed. · Neurology consulted. Recommendations appreciated. Cardiovascular:        Sinus Bradycardia   · Cause unknown  · Unlikely to be secondary to beta blocker overdose  · Metoprolol, Norvasc, and Amiodarone held  · Avoid AV Taco blocking agent  · Continue to monitor on telemetry  · Cardiology consulted. Recommendations appreciated. · Started on dopamine gtt    History of Atrial Fibrillation - CHADS VASC score of 6  · Patient is on Eliquis at home. Started on Heparin gtt. · Metoprolol held due to bradycardia. · Currently in normal sinus rhythm (Sinus bradycardia)  · Mg ordered and pending  · Potassium 5.5. Patient received calcium gluconate as well as insulin/dextrose. · Repeat BMP ordered and pending.      Chronic Diastolic Heart Failure  · Pro-BNP elevated  · Chest x-ray suggestive of fluid overload   · EF 60-65% on Echo from 3/14/2022  · IV Bumex 0.5 mg daily     Pulmonary:  COPD   · Recently started on home oxygen with number of liters unknown  · Currently saturating well on 4 liters nasal canula  · Continue albuterol inhaler and nebulizer  · Continue Symbicort 2 pull inhalation 2 times daily  · Respiratory evaluation and treatment  · Keep oxygen between 88-92%  · Continue BiPAP     GI/Nutrition  · Glycolax PRN  · Ulcer Prophylaxis: Not indicated  · Diet:Diet NPO     Renal/Fluid/Electrolyte  JOY on CKD Stage 4  · Creatinine 2.13 with baseline around 1.7  · Most likely pre-renal secondary to decreased perfusion due to pulmonary vascular congestion   · Nephrology consulted. Recommendations appreciated. · Continue IV Bumex 0.5 mg daily (patient is PO Bumex at same dose at home)    ID  · Further antibiotics not indicated  · Given 1 time dose of Levaquin due to concerns for CAP  · Pro-calcitonin 0.11  · Urine and Blood cultures x 2 ordered and pending. Minimal/No Concern for sepsis. · Will continue to monitor    Hematology:  Recent Labs     06/27/22  1244   HGB 10.5*   · Stable. Daily CBC. Endocrine:   · Hx of diabetes mellitus not on any home medications  · Most recent HbA1c 4.6 from 3/31/2022  · Will continue to monitor  · Patient is currently NPO     DVT Prophylaxis  · Heparin gtt    Discharge Needs: To remain in ICU. Wean Dopamine gtt as tolerated. PT/OT. CODE STATUS: Full Code     Bernardo Aiken MD  PGY-2, Internal Medicine Resident  [facility] 2  6/27/2022 4:48 PM    The critical care team assigned to the patient will be following up the patient in the intensive care unit. I have discussed the current plan with the critical care attending. The above mentioned assessment and plan will be reviewed again in detail by the critical care attending at bedside, and can be further changed or modified accordingly by the attending physician. Attending Physician Statement  I have discussed the care of [address] [place] [address] Drive, including pertinent history and exam findings with the resident. I have reviewed the key elements of all parts of the encounter with the resident. I have seen and examined the patient with the resident. I agree with the assessment and plan and status of the problem list as documented. I have seen the patient in the emergency room when I CT was consulted from ER staff for admission to medical ICU because of severe bradycardia on epinephrine drip.   According to available history patient is on amiodarone history of atrial fibrillation on Eliquis at home compliance with medication patient lives with son daughter was present limited history was available. Patient had recent episode of bradycardia when she was on beta-blocker seen by cardiology the hospital was discharged on 12.5 mg twice daily of Lopressor additionally patient has been on amiodarone also when she was discharged she remained on amiodarone per cardiology. Patient was brought in because of fatigue tiredness weakness and worsening shortness of breath. Patient had transcutaneous pacing and through peripheral IV patient was started on epinephrine drip in the emergency room cardiology was consulted but consult was pending at that time EKG was consistent with severe bradycardia on the monitor she does have P waves while she is on epinephrine drip. Chest x-ray consistent with pulm edema pulmonary congestion when I saw her she was lethargic but arousable does answer question bilateral breath sounds were distant and decreased at bases. Venous blood gas showed PCO2 of 50 with pH of 7.26 she most likely have obstructive sleep apnea also. Cardiology consulted to change epinephrine to dopamine drip. Will need central venous line   will use BiPAP/NIV   Will watch mentation and neurological status. Patient is on Bumex and she is being followed by nephrology creatinine is usually around 1.8-2. We will get nephrology evaluation as she is in pulmonary edema with chronic kidney disease although pulm edema is likely related to severe bradycardia. Will hold amiodarone and any beta-blocker. Dopamine drip. Atropine if heart rate with hypotension currently patient is not hypotensive. Repeat chest x-ray tomorrow. Likely she will need Bumex tonight. Aspiration precaution.   Heparin drip and hold Eliquis      Total critical care time caring for this patient with life threatening, unstable organ failure, including direct patient contact, management of life support systems, review of data including imaging and labs, discussions with other team members and physicians at least 48  Min so far today, excluding procedures. Please note that this chart was generated using voice recognition Dragon dictation software. Although every effort was made to ensure the accuracy of this automated transcription, some errors in transcription may have occurred.      Gonzalo Grajeda MD  6/27/2022 9:58 PM

## 2022-06-27 NOTE — PROGRESS NOTES
NON INVASIVE VENTILATION  PROVIDE OPTIMAL VENTILATION/ACCEPTABLE SP02  IMPLEMENT NON INVASIVE VENTILATION PROTOCOL  ASSESSMENT SKIN INTEGRITY  PATIENT EDUCATION AS NEEDED  BIPAP AS NEEDED    BRONCHOSPASM/BRONCHOCONSTRICTION     [x]         IMPROVE AERATION/BREATH SOUNDS  [x]   ADMINISTER BRONCHODILATOR THERAPY AS APPROPRIATE  [x]   ASSESS BREATH SOUNDS  [x]   IMPLEMENT AEROSOL/MDI PROTOCOL  [x]   PATIENT EDUCATION AS NEEDED

## 2022-06-27 NOTE — CARE COORDINATION
06/27/22 1532   Service Assessment   Patient Orientation Unable to Assess   History Provided By Child/Family  (daughter Chantale Olson)   Primary Caregiver Family   Accompanied By/Relationship daughter Jenaro Dupree Staff   PCP Verified by CM Yes   Last Visit to PCP Within last 3 months   Can patient return to prior living arrangement Yes   Ability to make needs known: Fair   Family able to assist with home care needs: Yes   Community Resources ECF/Home Care   Social/Functional History   Lives With Son   Type of [address] Two level; Able to Live on Main level with bedroom/bathroom   Home Access Stairs to enter with rails   Entrance Stairs - Number of Steps 3   Home Equipment Cane;Walker, rolling; Wheelchair-manual;Lift chair   Receives Help From Family;Home health   ADL Assistance Needs assistance   Toileting Needs assistance   Homemaking Assistance Needs assistance   Ambulation Assistance Needs assistance   Transfer Assistance Needs assistance   Active  No   Discharge Planning   Type of Residence House   Living Arrangements Children   Current Services Prior To Admission Home Care;Oxygen Therapy;Durable Medical Equipment   Potential Assistance Purchasing Medications No   Meds-to-Beds: Does the patient want to have any new prescriptions delivered to bedside prior to discharge?  Yes   Type of [facility]   Patient expects to be discharged to: House   One/Two Story Residence Two story, live on first floor   Return home with son, current with [facility], per daughter, does not have SNF benefit

## 2022-06-28 ENCOUNTER — APPOINTMENT (OUTPATIENT)
Dept: GENERAL RADIOLOGY | Age: 72
DRG: 308 | End: 2022-06-28
Payer: OTHER GOVERNMENT

## 2022-06-28 ENCOUNTER — APPOINTMENT (OUTPATIENT)
Dept: ULTRASOUND IMAGING | Age: 72
DRG: 308 | End: 2022-06-28
Payer: OTHER GOVERNMENT

## 2022-06-28 ENCOUNTER — APPOINTMENT (OUTPATIENT)
Dept: INTERVENTIONAL RADIOLOGY/VASCULAR | Age: 72
DRG: 308 | End: 2022-06-28
Payer: OTHER GOVERNMENT

## 2022-06-28 PROBLEM — E87.5 HYPERKALEMIA: Status: ACTIVE | Noted: 2022-06-28

## 2022-06-28 PROBLEM — I50.33 ACUTE ON CHRONIC DIASTOLIC CONGESTIVE HEART FAILURE (HCC): Status: ACTIVE | Noted: 2020-02-20

## 2022-06-28 LAB
ABSOLUTE EOS #: 0 K/UL (ref 0–0.44)
ABSOLUTE IMMATURE GRANULOCYTE: 0.21 K/UL (ref 0–0.3)
ABSOLUTE LYMPH #: 0.43 K/UL (ref 1.1–3.7)
ABSOLUTE MONO #: 2.14 K/UL (ref 0.1–1.2)
ALLEN TEST: POSITIVE
ANION GAP SERPL CALCULATED.3IONS-SCNC: 13 MMOL/L (ref 9–17)
ANION GAP SERPL CALCULATED.3IONS-SCNC: 13 MMOL/L (ref 9–17)
ANION GAP SERPL CALCULATED.3IONS-SCNC: 9 MMOL/L (ref 9–17)
BASOPHILS # BLD: 0 % (ref 0–2)
BASOPHILS ABSOLUTE: 0 K/UL (ref 0–0.2)
BUN BLDV-MCNC: 65 MG/DL (ref 8–23)
BUN BLDV-MCNC: 66 MG/DL (ref 8–23)
BUN BLDV-MCNC: 69 MG/DL (ref 8–23)
CALCIUM SERPL-MCNC: 8.6 MG/DL (ref 8.6–10.4)
CALCIUM SERPL-MCNC: 8.8 MG/DL (ref 8.6–10.4)
CALCIUM SERPL-MCNC: 9.2 MG/DL (ref 8.6–10.4)
CARBOXYHEMOGLOBIN: 1 % (ref 0–5)
CHLORIDE BLD-SCNC: 107 MMOL/L (ref 98–107)
CHLORIDE BLD-SCNC: 108 MMOL/L (ref 98–107)
CHLORIDE BLD-SCNC: 108 MMOL/L (ref 98–107)
CO2: 17 MMOL/L (ref 20–31)
CO2: 19 MMOL/L (ref 20–31)
CO2: 21 MMOL/L (ref 20–31)
CREAT SERPL-MCNC: 2.35 MG/DL (ref 0.5–0.9)
CREAT SERPL-MCNC: 2.49 MG/DL (ref 0.5–0.9)
CREAT SERPL-MCNC: 2.49 MG/DL (ref 0.5–0.9)
CULTURE: NO GROWTH
EKG ATRIAL RATE: 37 BPM
EKG P-R INTERVAL: 194 MS
EKG Q-T INTERVAL: 542 MS
EKG QRS DURATION: 102 MS
EKG QTC CALCULATION (BAZETT): 425 MS
EKG R AXIS: 0 DEGREES
EKG VENTRICULAR RATE: 37 BPM
EOSINOPHILS RELATIVE PERCENT: 0 % (ref 1–4)
FIO2: 50
FIO2: ABNORMAL
GFR AFRICAN AMERICAN: 23 ML/MIN
GFR AFRICAN AMERICAN: 23 ML/MIN
GFR AFRICAN AMERICAN: 25 ML/MIN
GFR NON-AFRICAN AMERICAN: 19 ML/MIN
GFR NON-AFRICAN AMERICAN: 19 ML/MIN
GFR NON-AFRICAN AMERICAN: 20 ML/MIN
GFR SERPL CREATININE-BSD FRML MDRD: ABNORMAL ML/MIN/{1.73_M2}
GLUCOSE BLD-MCNC: 102 MG/DL (ref 65–105)
GLUCOSE BLD-MCNC: 152 MG/DL (ref 65–105)
GLUCOSE BLD-MCNC: 165 MG/DL (ref 70–99)
GLUCOSE BLD-MCNC: 184 MG/DL (ref 65–105)
GLUCOSE BLD-MCNC: 35 MG/DL (ref 65–105)
GLUCOSE BLD-MCNC: 68 MG/DL (ref 70–99)
GLUCOSE BLD-MCNC: 76 MG/DL (ref 65–105)
GLUCOSE BLD-MCNC: 78 MG/DL (ref 70–99)
HBV SURFACE AB TITR SER: <3.5 MIU/ML
HCO3 VENOUS: 21.8 MMOL/L (ref 24–30)
HCT VFR BLD CALC: 35 % (ref 36.3–47.1)
HEMOGLOBIN: 10.2 G/DL (ref 11.9–15.1)
HEPATITIS B CORE TOTAL ANTIBODY: NONREACTIVE
HEPATITIS B SURFACE ANTIGEN: NONREACTIVE
HEPATITIS C ANTIBODY: NONREACTIVE
IMMATURE GRANULOCYTES: 1 %
LYMPHOCYTES # BLD: 2 % (ref 24–43)
MCH RBC QN AUTO: 31 PG (ref 25.2–33.5)
MCHC RBC AUTO-ENTMCNC: 29.1 G/DL (ref 28.4–34.8)
MCV RBC AUTO: 106.4 FL (ref 82.6–102.9)
MONOCYTES # BLD: 10 % (ref 3–12)
MORPHOLOGY: ABNORMAL
MORPHOLOGY: ABNORMAL
MRSA, DNA, NASAL: NEGATIVE
NEGATIVE BASE EXCESS, ART: 5 (ref 0–2)
NEGATIVE BASE EXCESS, ART: 5 (ref 0–2)
NEGATIVE BASE EXCESS, VEN: 5.9 MMOL/L (ref 0–2)
NRBC AUTOMATED: 0 PER 100 WBC
O2 SAT, VEN: 86.6 % (ref 60–85)
PARTIAL THROMBOPLASTIN TIME: 89.8 SEC (ref 20.5–30.5)
PATIENT TEMP: 34.6
PATIENT TEMP: 37
PCO2, VEN: 57.1 (ref 39–55)
PDW BLD-RTO: 16.6 % (ref 11.8–14.4)
PH VENOUS: 7.21 (ref 7.32–7.42)
PLATELET # BLD: 312 K/UL (ref 138–453)
PMV BLD AUTO: 10.1 FL (ref 8.1–13.5)
PO2, VEN: 53.3 (ref 30–50)
POC HCO3: 22.4 MMOL/L (ref 21–28)
POC HCO3: 22.8 MMOL/L (ref 21–28)
POC O2 SATURATION: 94 % (ref 94–98)
POC O2 SATURATION: 94 % (ref 94–98)
POC PCO2 TEMP: 47 MM HG
POC PCO2: 51.7 MM HG (ref 35–48)
POC PCO2: 55.1 MM HG (ref 35–48)
POC PH TEMP: 7.28
POC PH: 7.22 (ref 7.35–7.45)
POC PH: 7.25 (ref 7.35–7.45)
POC PO2 TEMP: 72 MM HG
POC PO2: 84.3 MM HG (ref 83–108)
POC PO2: 85.3 MM HG (ref 83–108)
POC POTASSIUM: 5.3 MMOL/L (ref 3.5–4.5)
POTASSIUM SERPL-SCNC: 5.4 MMOL/L (ref 3.7–5.3)
POTASSIUM SERPL-SCNC: 5.4 MMOL/L (ref 3.7–5.3)
POTASSIUM SERPL-SCNC: 5.6 MMOL/L (ref 3.7–5.3)
RBC # BLD: 3.29 M/UL (ref 3.95–5.11)
SAMPLE SITE: ABNORMAL
SEG NEUTROPHILS: 87 % (ref 36–65)
SEGMENTED NEUTROPHILS ABSOLUTE COUNT: 18.62 K/UL (ref 1.5–8.1)
SODIUM BLD-SCNC: 137 MMOL/L (ref 135–144)
SODIUM BLD-SCNC: 138 MMOL/L (ref 135–144)
SODIUM BLD-SCNC: 140 MMOL/L (ref 135–144)
SPECIMEN DESCRIPTION: NORMAL
SPECIMEN DESCRIPTION: NORMAL
WBC # BLD: 21.4 K/UL (ref 3.5–11.3)

## 2022-06-28 PROCEDURE — 82803 BLOOD GASES ANY COMBINATION: CPT

## 2022-06-28 PROCEDURE — 99214 OFFICE O/P EST MOD 30 MIN: CPT

## 2022-06-28 PROCEDURE — 74018 RADEX ABDOMEN 1 VIEW: CPT

## 2022-06-28 PROCEDURE — 82947 ASSAY GLUCOSE BLOOD QUANT: CPT

## 2022-06-28 PROCEDURE — 76937 US GUIDE VASCULAR ACCESS: CPT

## 2022-06-28 PROCEDURE — 5A1955Z RESPIRATORY VENTILATION, GREATER THAN 96 CONSECUTIVE HOURS: ICD-10-PCS | Performed by: STUDENT IN AN ORGANIZED HEALTH CARE EDUCATION/TRAINING PROGRAM

## 2022-06-28 PROCEDURE — 2500000003 HC RX 250 WO HCPCS: Performed by: STUDENT IN AN ORGANIZED HEALTH CARE EDUCATION/TRAINING PROGRAM

## 2022-06-28 PROCEDURE — 2580000003 HC RX 258: Performed by: STUDENT IN AN ORGANIZED HEALTH CARE EDUCATION/TRAINING PROGRAM

## 2022-06-28 PROCEDURE — 82805 BLOOD GASES W/O2 SATURATION: CPT

## 2022-06-28 PROCEDURE — 86317 IMMUNOASSAY INFECTIOUS AGENT: CPT

## 2022-06-28 PROCEDURE — 36600 WITHDRAWAL OF ARTERIAL BLOOD: CPT

## 2022-06-28 PROCEDURE — 36415 COLL VENOUS BLD VENIPUNCTURE: CPT

## 2022-06-28 PROCEDURE — 71045 X-RAY EXAM CHEST 1 VIEW: CPT

## 2022-06-28 PROCEDURE — 94002 VENT MGMT INPAT INIT DAY: CPT

## 2022-06-28 PROCEDURE — C1752 CATH,HEMODIALYSIS,SHORT-TERM: HCPCS

## 2022-06-28 PROCEDURE — 6360000002 HC RX W HCPCS

## 2022-06-28 PROCEDURE — 2500000003 HC RX 250 WO HCPCS

## 2022-06-28 PROCEDURE — 6370000000 HC RX 637 (ALT 250 FOR IP): Performed by: STUDENT IN AN ORGANIZED HEALTH CARE EDUCATION/TRAINING PROGRAM

## 2022-06-28 PROCEDURE — 99222 1ST HOSP IP/OBS MODERATE 55: CPT | Performed by: PSYCHIATRY & NEUROLOGY

## 2022-06-28 PROCEDURE — 93010 ELECTROCARDIOGRAM REPORT: CPT | Performed by: INTERNAL MEDICINE

## 2022-06-28 PROCEDURE — 99222 1ST HOSP IP/OBS MODERATE 55: CPT | Performed by: INTERNAL MEDICINE

## 2022-06-28 PROCEDURE — 86803 HEPATITIS C AB TEST: CPT

## 2022-06-28 PROCEDURE — 76770 US EXAM ABDO BACK WALL COMP: CPT

## 2022-06-28 PROCEDURE — 6360000002 HC RX W HCPCS: Performed by: STUDENT IN AN ORGANIZED HEALTH CARE EDUCATION/TRAINING PROGRAM

## 2022-06-28 PROCEDURE — 80048 BASIC METABOLIC PNL TOTAL CA: CPT

## 2022-06-28 PROCEDURE — 94761 N-INVAS EAR/PLS OXIMETRY MLT: CPT

## 2022-06-28 PROCEDURE — 90935 HEMODIALYSIS ONE EVALUATION: CPT

## 2022-06-28 PROCEDURE — 6360000002 HC RX W HCPCS: Performed by: INTERNAL MEDICINE

## 2022-06-28 PROCEDURE — 5A1D70Z PERFORMANCE OF URINARY FILTRATION, INTERMITTENT, LESS THAN 6 HOURS PER DAY: ICD-10-PCS | Performed by: INTERNAL MEDICINE

## 2022-06-28 PROCEDURE — 6360000002 HC RX W HCPCS: Performed by: PHYSICIAN ASSISTANT

## 2022-06-28 PROCEDURE — 85730 THROMBOPLASTIN TIME PARTIAL: CPT

## 2022-06-28 PROCEDURE — 0BH18EZ INSERTION OF ENDOTRACHEAL AIRWAY INTO TRACHEA, VIA NATURAL OR ARTIFICIAL OPENING ENDOSCOPIC: ICD-10-PCS | Performed by: STUDENT IN AN ORGANIZED HEALTH CARE EDUCATION/TRAINING PROGRAM

## 2022-06-28 PROCEDURE — 94640 AIRWAY INHALATION TREATMENT: CPT

## 2022-06-28 PROCEDURE — 2700000000 HC OXYGEN THERAPY PER DAY

## 2022-06-28 PROCEDURE — C1769 GUIDE WIRE: HCPCS

## 2022-06-28 PROCEDURE — 77001 FLUOROGUIDE FOR VEIN DEVICE: CPT

## 2022-06-28 PROCEDURE — 99291 CRITICAL CARE FIRST HOUR: CPT | Performed by: INTERNAL MEDICINE

## 2022-06-28 PROCEDURE — 94660 CPAP INITIATION&MGMT: CPT

## 2022-06-28 PROCEDURE — 84132 ASSAY OF SERUM POTASSIUM: CPT

## 2022-06-28 PROCEDURE — 85025 COMPLETE CBC W/AUTO DIFF WBC: CPT

## 2022-06-28 PROCEDURE — 99292 CRITICAL CARE ADDL 30 MIN: CPT | Performed by: INTERNAL MEDICINE

## 2022-06-28 PROCEDURE — 87340 HEPATITIS B SURFACE AG IA: CPT

## 2022-06-28 PROCEDURE — 31500 INSERT EMERGENCY AIRWAY: CPT | Performed by: INTERNAL MEDICINE

## 2022-06-28 PROCEDURE — C1894 INTRO/SHEATH, NON-LASER: HCPCS

## 2022-06-28 PROCEDURE — 36556 INSERT NON-TUNNEL CV CATH: CPT

## 2022-06-28 PROCEDURE — 2000000000 HC ICU R&B

## 2022-06-28 PROCEDURE — 31500 INSERT EMERGENCY AIRWAY: CPT

## 2022-06-28 PROCEDURE — 86704 HEP B CORE ANTIBODY TOTAL: CPT

## 2022-06-28 PROCEDURE — 2709999900 HC NON-CHARGEABLE SUPPLY

## 2022-06-28 PROCEDURE — 02H633Z INSERTION OF INFUSION DEVICE INTO RIGHT ATRIUM, PERCUTANEOUS APPROACH: ICD-10-PCS | Performed by: STUDENT IN AN ORGANIZED HEALTH CARE EDUCATION/TRAINING PROGRAM

## 2022-06-28 RX ORDER — HEPARIN SODIUM (PORCINE) LOCK FLUSH IV SOLN 100 UNIT/ML 100 UNIT/ML
SOLUTION INTRAVENOUS
Status: COMPLETED | OUTPATIENT
Start: 2022-06-28 | End: 2022-06-28

## 2022-06-28 RX ORDER — DEXTROSE MONOHYDRATE 25 G/50ML
25 INJECTION, SOLUTION INTRAVENOUS ONCE
Status: COMPLETED | OUTPATIENT
Start: 2022-06-28 | End: 2022-06-28

## 2022-06-28 RX ORDER — ETOMIDATE 2 MG/ML
INJECTION INTRAVENOUS
Status: COMPLETED
Start: 2022-06-28 | End: 2022-06-28

## 2022-06-28 RX ORDER — HEPARIN SODIUM 5000 [USP'U]/ML
1200 INJECTION, SOLUTION INTRAVENOUS; SUBCUTANEOUS PRN
Status: DISCONTINUED | OUTPATIENT
Start: 2022-06-28 | End: 2022-06-28

## 2022-06-28 RX ORDER — FENTANYL CITRATE 50 UG/ML
50 INJECTION, SOLUTION INTRAMUSCULAR; INTRAVENOUS
Status: DISCONTINUED | OUTPATIENT
Start: 2022-06-28 | End: 2022-07-18 | Stop reason: HOSPADM

## 2022-06-28 RX ORDER — SODIUM POLYSTYRENE SULFONATE 15 G/60ML
15 SUSPENSION ORAL; RECTAL ONCE
Status: DISCONTINUED | OUTPATIENT
Start: 2022-06-28 | End: 2022-07-11

## 2022-06-28 RX ORDER — HEPARIN SODIUM 1000 [USP'U]/ML
1200 INJECTION, SOLUTION INTRAVENOUS; SUBCUTANEOUS PRN
Status: DISCONTINUED | OUTPATIENT
Start: 2022-06-28 | End: 2022-07-11

## 2022-06-28 RX ORDER — FENTANYL CITRATE 50 UG/ML
25 INJECTION, SOLUTION INTRAMUSCULAR; INTRAVENOUS ONCE
Status: COMPLETED | OUTPATIENT
Start: 2022-06-28 | End: 2022-06-28

## 2022-06-28 RX ORDER — ROCURONIUM BROMIDE 10 MG/ML
0.6 INJECTION, SOLUTION INTRAVENOUS ONCE
Status: DISCONTINUED | OUTPATIENT
Start: 2022-06-28 | End: 2022-07-12

## 2022-06-28 RX ORDER — HEPARIN SODIUM 5000 [USP'U]/ML
1600 INJECTION, SOLUTION INTRAVENOUS; SUBCUTANEOUS PRN
Status: DISCONTINUED | OUTPATIENT
Start: 2022-06-28 | End: 2022-06-28

## 2022-06-28 RX ORDER — NOREPINEPHRINE BIT/0.9 % NACL 16MG/250ML
1-100 INFUSION BOTTLE (ML) INTRAVENOUS CONTINUOUS
Status: DISCONTINUED | OUTPATIENT
Start: 2022-06-28 | End: 2022-06-29

## 2022-06-28 RX ORDER — HEPARIN SODIUM 1000 [USP'U]/ML
1500 INJECTION, SOLUTION INTRAVENOUS; SUBCUTANEOUS PRN
Status: DISCONTINUED | OUTPATIENT
Start: 2022-06-28 | End: 2022-06-28

## 2022-06-28 RX ORDER — LORAZEPAM 2 MG/ML
1 INJECTION INTRAMUSCULAR ONCE
Status: COMPLETED | OUTPATIENT
Start: 2022-06-28 | End: 2022-06-28

## 2022-06-28 RX ORDER — CALCIUM GLUCONATE 20 MG/ML
2000 INJECTION, SOLUTION INTRAVENOUS ONCE
Status: COMPLETED | OUTPATIENT
Start: 2022-06-28 | End: 2022-06-28

## 2022-06-28 RX ORDER — FENTANYL CITRATE 50 UG/ML
INJECTION, SOLUTION INTRAMUSCULAR; INTRAVENOUS
Status: COMPLETED
Start: 2022-06-28 | End: 2022-06-28

## 2022-06-28 RX ORDER — AMIODARONE HYDROCHLORIDE 200 MG/1
100 TABLET ORAL DAILY
Status: DISCONTINUED | OUTPATIENT
Start: 2022-06-28 | End: 2022-07-18 | Stop reason: HOSPADM

## 2022-06-28 RX ORDER — ROCURONIUM BROMIDE 10 MG/ML
INJECTION, SOLUTION INTRAVENOUS
Status: DISPENSED
Start: 2022-06-28 | End: 2022-06-29

## 2022-06-28 RX ORDER — ETOMIDATE 2 MG/ML
0.3 INJECTION INTRAVENOUS ONCE
Status: COMPLETED | OUTPATIENT
Start: 2022-06-28 | End: 2022-06-28

## 2022-06-28 RX ORDER — MIDAZOLAM HYDROCHLORIDE 2 MG/2ML
2 INJECTION, SOLUTION INTRAMUSCULAR; INTRAVENOUS ONCE
Status: COMPLETED | OUTPATIENT
Start: 2022-06-28 | End: 2022-06-28

## 2022-06-28 RX ORDER — ALBUTEROL SULFATE 2.5 MG/3ML
2.5 SOLUTION RESPIRATORY (INHALATION) EVERY 6 HOURS
Status: DISCONTINUED | OUTPATIENT
Start: 2022-06-28 | End: 2022-07-14

## 2022-06-28 RX ORDER — HEPARIN SODIUM 1000 [USP'U]/ML
1300 INJECTION, SOLUTION INTRAVENOUS; SUBCUTANEOUS PRN
Status: DISCONTINUED | OUTPATIENT
Start: 2022-06-28 | End: 2022-07-11

## 2022-06-28 RX ORDER — MIDAZOLAM HYDROCHLORIDE 1 MG/ML
INJECTION INTRAMUSCULAR; INTRAVENOUS
Status: COMPLETED
Start: 2022-06-28 | End: 2022-06-28

## 2022-06-28 RX ADMIN — DOPAMINE HYDROCHLORIDE IN DEXTROSE 11 MCG/KG/MIN: 1.6 INJECTION, SOLUTION INTRAVENOUS at 15:14

## 2022-06-28 RX ADMIN — LORAZEPAM 1 MG: 2 INJECTION INTRAMUSCULAR; INTRAVENOUS at 04:15

## 2022-06-28 RX ADMIN — DOPAMINE HYDROCHLORIDE IN DEXTROSE 15 MCG/KG/MIN: 1.6 INJECTION, SOLUTION INTRAVENOUS at 00:24

## 2022-06-28 RX ADMIN — BUDESONIDE AND FORMOTEROL FUMARATE DIHYDRATE 2 PUFF: 160; 4.5 AEROSOL RESPIRATORY (INHALATION) at 08:32

## 2022-06-28 RX ADMIN — MIDAZOLAM HYDROCHLORIDE 2 MG: 1 INJECTION, SOLUTION INTRAMUSCULAR; INTRAVENOUS at 15:19

## 2022-06-28 RX ADMIN — SODIUM ZIRCONIUM CYCLOSILICATE 10 G: 10 POWDER, FOR SUSPENSION ORAL at 06:25

## 2022-06-28 RX ADMIN — ETOMIDATE 20.6 MG: 2 INJECTION, SOLUTION INTRAVENOUS at 15:21

## 2022-06-28 RX ADMIN — MIDAZOLAM HYDROCHLORIDE 2 MG: 2 INJECTION, SOLUTION INTRAMUSCULAR; INTRAVENOUS at 15:19

## 2022-06-28 RX ADMIN — SODIUM CHLORIDE, PRESERVATIVE FREE 10 ML: 5 INJECTION INTRAVENOUS at 08:33

## 2022-06-28 RX ADMIN — INSULIN HUMAN 10 UNITS: 100 INJECTION, SOLUTION PARENTERAL at 10:05

## 2022-06-28 RX ADMIN — DEXTROSE MONOHYDRATE 25 G: 70 INJECTION, SOLUTION INTRAVENOUS at 16:02

## 2022-06-28 RX ADMIN — HEPARIN SODIUM 1300 UNITS: 1000 INJECTION INTRAVENOUS; SUBCUTANEOUS at 19:50

## 2022-06-28 RX ADMIN — FENTANYL CITRATE 25 MCG: 50 INJECTION, SOLUTION INTRAMUSCULAR; INTRAVENOUS at 13:45

## 2022-06-28 RX ADMIN — ALBUTEROL SULFATE 2.5 MG: 2.5 SOLUTION RESPIRATORY (INHALATION) at 20:15

## 2022-06-28 RX ADMIN — ALBUTEROL SULFATE 2.5 MG: 2.5 SOLUTION RESPIRATORY (INHALATION) at 08:11

## 2022-06-28 RX ADMIN — DEXTROSE MONOHYDRATE 25 G: 70 INJECTION, SOLUTION INTRAVENOUS at 06:24

## 2022-06-28 RX ADMIN — ANTI-FUNGAL POWDER MICONAZOLE NITRATE TALC FREE: 1.42 POWDER TOPICAL at 20:29

## 2022-06-28 RX ADMIN — DEXTROSE MONOHYDRATE 250 ML: 100 INJECTION, SOLUTION INTRAVENOUS at 10:06

## 2022-06-28 RX ADMIN — INSULIN HUMAN 10 UNITS: 100 INJECTION, SOLUTION PARENTERAL at 06:25

## 2022-06-28 RX ADMIN — HEPARIN 6500 UNITS: 100 SYRINGE at 16:43

## 2022-06-28 RX ADMIN — DEXTROSE MONOHYDRATE 250 ML: 100 INJECTION, SOLUTION INTRAVENOUS at 11:59

## 2022-06-28 RX ADMIN — SODIUM CHLORIDE, PRESERVATIVE FREE 10 ML: 5 INJECTION INTRAVENOUS at 20:30

## 2022-06-28 RX ADMIN — ANTI-FUNGAL POWDER MICONAZOLE NITRATE TALC FREE: 1.42 POWDER TOPICAL at 08:32

## 2022-06-28 RX ADMIN — CALCIUM GLUCONATE 2000 MG: 20 INJECTION, SOLUTION INTRAVENOUS at 06:03

## 2022-06-28 RX ADMIN — FENTANYL CITRATE 25 MCG: 50 INJECTION, SOLUTION INTRAMUSCULAR; INTRAVENOUS at 12:28

## 2022-06-28 RX ADMIN — HEPARIN SODIUM 1200 UNITS: 1000 INJECTION INTRAVENOUS; SUBCUTANEOUS at 19:50

## 2022-06-28 RX ADMIN — HEPARIN 6000 UNITS: 100 SYRINGE at 16:42

## 2022-06-28 RX ADMIN — DOPAMINE HYDROCHLORIDE IN DEXTROSE 15 MCG/KG/MIN: 1.6 INJECTION, SOLUTION INTRAVENOUS at 06:36

## 2022-06-28 RX ADMIN — CEFEPIME HYDROCHLORIDE 1000 MG: 1 INJECTION, POWDER, FOR SOLUTION INTRAMUSCULAR; INTRAVENOUS at 11:48

## 2022-06-28 RX ADMIN — Medication 1 MG/HR: at 15:58

## 2022-06-28 RX ADMIN — HEPARIN SODIUM 9 UNITS/KG/HR: 10000 INJECTION, SOLUTION INTRAVENOUS at 23:28

## 2022-06-28 RX ADMIN — ETOMIDATE 20.6 MG: 2 INJECTION INTRAVENOUS at 15:21

## 2022-06-28 RX ADMIN — Medication 2 MCG/MIN: at 10:43

## 2022-06-28 RX ADMIN — MAGNESIUM GLUCONATE 500 MG ORAL TABLET 400 MG: 500 TABLET ORAL at 20:29

## 2022-06-28 RX ADMIN — LORAZEPAM 1 MG: 2 INJECTION INTRAMUSCULAR; INTRAVENOUS at 01:07

## 2022-06-28 ASSESSMENT — PULMONARY FUNCTION TESTS
PIF_VALUE: 23
PIF_VALUE: 23
PIF_VALUE: 20
PIF_VALUE: 23
PIF_VALUE: 19

## 2022-06-28 NOTE — CARE COORDINATION
Transitional Planning  Spoke with Dtr Jes Omar at bedside. She states pt has become very weak the last few months. They tried previously to discuss code status DPOA with her and her response always was she will think about it. Pt currently is not oriented to make decisions. Dtr states pt does not have SNF benefits. Feel at this point pt is going to need SNF or 24/7 care until she becomes stronger. Called [employer] hinojosa help left VM to speak with DTR about income qualifications for [place] application.

## 2022-06-28 NOTE — CONSULTS
[facility] Cardiology Cardiology    Inpatient Consultation Note               Today's Date: 6/28/2022  Patient Name: Justice Hull  Date of admission: 6/27/2022 12:28 PM  Patient's age: 66 y.o., 1950  Admission Dx: Bradycardia [R00.1]    Reason for  Consult:  Bradycardia    Requesting Physician: Sameer Dyson MD    CHIEF COMPLAINT:     Chief Complaint   Patient presents with    Bradycardia       History Obtained From:  patient, electronic medical record    HISTORY OF PRESENT ILLNESS:    66year old female presented to the ED by EMS as she was feeling short of breath at home and noted to be desaturating. EMS noted patient to be bradycardiac with HR of 35 and started transcutaneous pacing with a dose of 2 mg atropine. She was brought to [facility] and admitted for further management. K noted to be elevated to 5.6 on admission. Was started on dopamine gtt by ED team.  Cardiology consulted. Of note was recently evaluated by Cardiology for bradycardia. Was discharged with plan to dc amiodarone and decrease BB. Was discharged on Holter, report listed below.     Past Medical History:   has a past medical history of Acute CVA (cerebrovascular accident) ([address] Acute kidney injury superimposed on chronic kidney disease ([address] Acute on chronic combined systolic (congestive) and diastolic (congestive) heart failure ([address] JOY (acute kidney injury) ([address] Altered mental status, Anticoagulated, Arthritis, Asthma, Bradycardia, Brain mass, Cellulitis and abscess, Cellulitis and abscess of unspecified site, Cellulitis of both lower extremities, Cellulitis of left lower extremity, Cellulitis of lower extremity, CHF (congestive heart failure) ([address] Chronic kidney disease, unspecified, CKD stage 4 secondary to hypertension ([address] Coronary atherosclerosis of native coronary artery, Elevated LFTs, Essential hypertension, Essential hypertension, malignant, Hallucinations, Hard of hearing, Head contusion, Hypertensive emergency, Hypertensive urgency with nyla, Hypokalemia, Hypomagnesemia, Iron deficiency anemia, unspecified, Meningioma ([facility] [address] Myocardial infarction ([facility] [address] [facility] Other and unspecified hyperlipidemia, Pure hypercholesterolemia, Stage 4 chronic kidney disease ([facility] [address] Toxic metabolic encephalopathy, Type 2 diabetes mellitus with stage 4 chronic kidney disease, without long-term current use of insulin ([facility] [address] [facility] Type II or unspecified type diabetes mellitus without mention of complication, not stated as uncontrolled, Unspecified asthma(493.90), Unspecified venous (peripheral) insufficiency, Unspecified vitamin D deficiency, and UTI (urinary tract infection). Past Surgical History:   has a past surgical history that includes Tonsillectomy and adenoidectomy; Coronary artery bypass graft; intubation (3/7/2022); Thoracentesis (3/10/2022); Upper gastrointestinal endoscopy (N/A, 3/15/2022); and Colonoscopy (N/A, 3/15/2022). Home Medications:    Prior to Admission medications    Medication Sig Start Date End Date Taking?  Authorizing Provider   ferrous sulfate (IRON 325) 325 (65 Fe) MG tablet Take 1 tablet by mouth daily (with breakfast) 6/22/22   Gavino Cyr MD   apixaban (ELIQUIS) 2.5 MG TABS tablet Take 1 tablet by mouth 2 times daily 6/22/22   Gavino Cyr MD   apixaban (ELIQUIS) 2.5 MG TABS tablet Take 1 tablet by mouth 2 times daily 6/22/22   Gavino Cyr MD   ferrous sulfate (IRON 325) 325 (65 Fe) MG tablet Take 1 tablet by mouth daily (with breakfast) 6/22/22   Gavino Cyr MD   fluticasone-salmeterol (ADVAIR HFA) 115-21 MCG/ACT inhaler Inhale 2 puffs into the lungs 2 times daily 6/22/22   Gavino Cyr MD   metoprolol tartrate (LOPRESSOR) 25 MG tablet Take 1 tablet by mouth 2 times daily 6/17/22   Gavino Cyr MD   aspirin EC 81 MG EC tablet Take 1 tablet by mouth daily 6/17/22   Gavino Cyr MD   hydrALAZINE (APRESOLINE) 25 MG tablet Take 1 tablet by mouth 3 times daily 6/17/22 Kris Lu MD   albuterol sulfate HFA (PROAIR HFA) 108 (90 Base) MCG/ACT inhaler Inhale 2 puffs into the lungs every 6 hours as needed for Wheezing or Shortness of Breath (cough) 6/17/22   Kris Lu MD   metoprolol tartrate (LOPRESSOR) 25 MG tablet Take 1 tablet by mouth 2 times daily 6/17/22   Kris Lu MD   aspirin EC 81 MG EC tablet Take 1 tablet by mouth daily 6/17/22   Kris Lu MD   hydrALAZINE (APRESOLINE) 25 MG tablet Take 1 tablet by mouth 3 times daily 6/17/22   Kris Lu MD   albuterol sulfate HFA (PROAIR HFA) 108 (90 Base) MCG/ACT inhaler Inhale 2 puffs into the lungs every 6 hours as needed for Wheezing or Shortness of Breath (cough) 6/17/22   Kris Lu MD   fluticasone-salmeterol (ADVAIR HFA) 189-48 MCG/ACT inhaler Inhale 2 puffs into the lungs 2 times daily Maintenance inhaler 6/17/22   Kris Lu MD   amLODIPine (NORVASC) 10 MG tablet Take 1 tablet by mouth nightly 6/11/22   Silvana Ortega MD   Blood Pressure KIT Diagnosis: HTN. Needs to check blood pressure 1-2 times a day until stable, then once a day. Goal blood pressure less than 135/85, and above 110/60. 5/26/22   Kris Lu MD   vitamin D (ERGOCALCIFEROL) 1.25 MG (57446 UT) CAPS capsule Take 1 capsule by mouth once a week Sundays 4/28/22   Kris Lu MD   amiodarone (CORDARONE) 200 MG tablet Take 1 tablet by mouth daily 4/15/22   Kris Lu MD   Nebulizers (COMPRESSOR/NEBULIZER) MISC Nebulizer with compressor. Disposable Med Nebs 2 /month. Reusable Med Nebs 1 per 6 months. Aerosol Mask 1 per month. Replacement Filters 2 per month. All other related supplies as needed per month. Medications being used: Albuterol . 083 unit dose vial. Frequency: every 6 hrs x 4/day . Length of Need: 1  Patient not taking: Reported on 6/17/2022 2/22/22   Jeronimo Wang MD   albuterol (PROVENTIL) (2.5 MG/3ML) 0.083% nebulizer solution Take 3 mLs by nebulization every 6 hours as needed for Wheezing or Shortness of Breath  Patient not taking: Reported on 6/17/2022 2/22/22   Mendel Gola, MD   Misc. Devices (ADJUST FOLD CANE/YORK HANDLE) MISC Use daily for walking 2/22/22   Mendel Gola, MD   Handicap Placard MISC by Does not apply route Expires on 12/30/25 2/22/22   Mendel Gola, MD   atorvastatin (LIPITOR) 40 MG tablet Take 1 tablet by mouth once daily 2/15/22   Jony Cadet MD   desloratadine (CLARINEX) 5 MG tablet Take 1 tablet by mouth once daily 2/15/22   Jony Cadet MD   FreeStyle Lancets MISC 1 each by Does not apply route daily Patient needs to contact office before any further refills will be approved 2/15/22   Jony Cadet MD   magnesium oxide (MAG-OX) 400 (241.3 Mg) MG TABS tablet Take 1 tablet by mouth twice daily 2/15/22   Jony Cadet MD   miconazole (MICOTIN) 2 % powder Apply topically 2 times daily UNDER THE SKIN FOLDS LONG TERM 2/15/22   Jony Cadet MD   Multiple Vitamins-Minerals (THERAPEUTIC MULTIVITAMIN-MINERALS) tablet Take 1 tablet by mouth daily 2/15/22   Jony Cadet MD   blood glucose test strips (FREESTYLE LITE) strip 1 each by In Vitro route daily As needed.  3/19/21   Jony Cadet MD   Blood Pressure KIT 1 kit by Does not apply route three times daily  Patient not taking: Reported on 6/17/2022 12/19/19   Kassie Bolaños MD   blood glucose monitor kit and supplies 1 kit by Other route three times daily Dispense Butterfly Elite CBG Device 8/20/19   Tu Wooten MD        Current Facility-Administered Medications: sodium polystyrene (KAYEXALATE) 15 GM/60ML suspension 15 g, 15 g, Oral, Once  insulin regular (HUMULIN R;NOVOLIN R) injection 10 Units, 10 Units, IntraVENous, Once **AND** dextrose bolus 10% 250 mL, 250 mL, IntraVENous, Once **AND** POCT Glucose, , , Q30 Min **AND** POCT Glucose, , , Q1H **AND** POCT Glucose, , , 4x Daily AC & HS  glucose chewable tablet 16 g, 4 tablet, Oral, PRN  dextrose bolus 10% 125 mL, 125 mL, IntraVENous, PRN **OR** dextrose bolus 10% 250 mL, 250 mL, IntraVENous, PRN  glucagon (rDNA) injection 1 mg, 1 mg, IntraMUSCular, PRN  dextrose 5 % solution, 100 mL/hr, IntraVENous, PRN  sodium chloride flush 0.9 % injection 5-40 mL, 5-40 mL, IntraVENous, 2 times per day  sodium chloride flush 0.9 % injection 5-40 mL, 5-40 mL, IntraVENous, PRN  0.9 % sodium chloride infusion, , IntraVENous, PRN  ondansetron (ZOFRAN-ODT) disintegrating tablet 4 mg, 4 mg, Oral, Q8H PRN **OR** ondansetron (ZOFRAN) injection 4 mg, 4 mg, IntraVENous, Q6H PRN  polyethylene glycol (GLYCOLAX) packet 17 g, 17 g, Oral, Daily PRN  acetaminophen (TYLENOL) tablet 650 mg, 650 mg, Oral, Q6H PRN **OR** acetaminophen (TYLENOL) suppository 650 mg, 650 mg, Rectal, Q6H PRN  heparin (porcine) injection 4,000 Units, 4,000 Units, IntraVENous, PRN  heparin (porcine) injection 2,000 Units, 2,000 Units, IntraVENous, PRN  heparin 25,000 units in dextrose 5 % 250 mL infusion (rate based), 5-30 Units/kg/hr, IntraVENous, Continuous  budesonide-formoterol (SYMBICORT) 160-4.5 MCG/ACT inhaler 2 puff, 2 puff, Inhalation, BID  aspirin EC tablet 81 mg, 81 mg, Oral, Daily  atorvastatin (LIPITOR) tablet 40 mg, 40 mg, Oral, Daily  magnesium oxide (MAG-OX) tablet 400 mg, 400 mg, Oral, BID  miconazole (MICOTIN) 2 % powder, , Topical, BID  albuterol sulfate HFA (PROVENTIL;VENTOLIN;PROAIR) 108 (90 Base) MCG/ACT inhaler 2 puff, 2 puff, Inhalation, Q6H PRN  ferrous sulfate (FE TABS 325) EC tablet 325 mg, 325 mg, Oral, Daily with breakfast  albuterol (PROVENTIL) nebulizer solution 2.5 mg, 2.5 mg, Nebulization, TID  DOPamine (INTROPIN) 400 mg in dextrose 5 % 250 mL infusion, 1-20 mcg/kg/min, IntraVENous, Continuous  EPINEPHrine (EPINEPHrine HCL) 5 mg in dextrose 5 % 250 mL infusion, 1-20 mcg/min, IntraVENous, Continuous  morphine (PF) injection 2 mg, 2 mg, IntraVENous, Q4H PRN    Allergies:  Latex, Aleve [naproxen sodium], Pioglitazone, Claritin [loratadine], Keflex [cephalexin], and Lisinopril    Social History:   reports that she quit smoking about 22 years ago. She has a 2.50 pack-year smoking history. She has never used smokeless tobacco. She reports previous alcohol use. She reports that she does not use drugs. Family History: family history includes Diabetes in her maternal grandmother, mother, and sister; Heart Disease in her father and mother; High Blood Pressure in her sister. REVIEW OF SYSTEMS:      · Constitutional: there has been no unanticipated weight loss. · Eyes: No visual changes or diplopia. · ENT: No Headaches  · Cardiovascular:  Remaining as above  · Respiratory: No cough  · Gastrointestinal: No abdominal pain. No change in bowel or bladder habits. · Genitourinary: No dysuria, trouble voiding, or hematuria. · Musculoskeletal: No joint complaints. · Neurological: No headache  · Hematologic/Lymphatic: No abnormal bruising or bleeding      PHYSICAL EXAM:      /87   Pulse 80   Temp 99.9 °F (37.7 °C) (Bladder)   Resp 25   Wt 151 lb 1.7 oz (68.5 kg)   SpO2 94%   BMI 30.52 kg/m²      Intake/Output Summary (Last 24 hours) at 6/28/2022 0940  Last data filed at 6/28/2022 0800  Gross per 24 hour   Intake 1443.35 ml   Output 97 ml   Net 1346.35 ml         Constitutional and General Appearance:    appears stated age  Respiratory:  · No for increased work of breathing. · On auscultation: clear to auscultation bilaterally  Cardiovascular:  · Regular S1 and S2.   · No murmurs  Abdomen:   · No masses or tenderness  · Bowel sounds present  Extremities:  ·  No Cyanosis or Clubbing  ·  Lower extremity edema: No  Neurological:  · Not performed    DATA:    Diagnostics:    HOLTER 6/14/2022:   1. Predominantly NSR   2. PACs with 4 short runs of SVT/PAT (longest 11 beats)  3. No AFib/AFlutter      ECHO 3/14/2022: EF 60-65%, no regurg/stenosis      KARLA/CV 7/30/2020: EF 55%, no thrombus/veg, moderate AI, mild MR/TR.  Successful CV to NSR.      CATH 7/21/06: Patent two grafts with occluded SVG-OM. EF 60%.      CABG 2003: LIMA-LAD, SVG-OM, SVG-RCA     Labs:     CBC:   Recent Labs     06/27/22  1244 06/27/22  1244 06/27/22  1910 06/28/22  0427   WBC 10.4  --   --  21.4*   HGB 10.5*   < > 11.2* 10.2*   HCT 35.2*   < > 39.4 35.0*     --   --  312    < > = values in this interval not displayed. BMP:   Recent Labs     06/27/22  1908 06/28/22  0427    138   K 5.5* 5.6*   CO2 18* 17*   BUN 65* 69*   CREATININE 2.17* 2.35*   LABGLOM 22* 20*   GLUCOSE 122* 165*     Pro-BNP:    Recent Labs     06/27/22  1244   PROBNP 7,757*     APTT:  Recent Labs     06/27/22  1743 06/28/22  0217   APTT 22.3 89.8*       FASTING LIPID PANEL:  Lab Results   Component Value Date    HDL 59 12/06/2021    TRIG 132 12/06/2021     LIVER PROFILE:  Recent Labs     06/27/22  1459   AST 38*   ALT 25   LABALBU 2.6*         Patient's Active Problem List  Principal Problem:    Bradycardia  Active Problems:    Lymphedema    Acute kidney injury superimposed on CKD (HCC)    Hyperkalemia  Resolved Problems:    * No resolved hospital problems. *        IMPRESSION:    1. Hyperkalemia  2. Sinus Bradycardia with hyperkalemia and hypothermia on admission  3. CABG with LIMA-LAD, SVG-OM and SVG-RCA in 2003. Occluded SVG-OM2 by heart catheterization in 2006. 4. LV dysfunction, resolved, echo march 2022 EF 60-65%    5. Hyperlipidemia. 6. Diabetes  7. Paroxysmal A fib     RECOMMENDATIONS:  1. Currently on dopamine and epinephrine. HR in 70-80's. Wean as tolerated  2. Nephrology consult for hyperkalemia. Mangement of hyperkalemia per Critical care team and Nephrology  3. Avoid any AV dotty blocking agents  4. Will decrease amiodarone to 100 mg once daily and discontinue BB  5. Continue ASA and statin  6. Continue heparin gtt. Hold eliquis  7. K between 4-5 & Mg between 2-2.4  8. Rest per Critical care team.    Thank you for allowing us to participate in the care of [address] [place] [address].

## 2022-06-28 NOTE — CONSULTS
[facility]  Initial Consult Note  Date: 6/28/2022    Hospital day :1     Impression:   1. Severe bradycardia requiring dopamine and Levophed for blood pressure support  2. Altered mental status  3. Acute kidney injury on chronic kidney disease  4. Pulmonary edema  5. Acute respiratory insufficiency on BiPAP  6. Leukocytosis-unclear etiology at this time  7. Multiple/bilateral lower extremity ulcerations-superficial not acutely infected  8. Coccygeal ulceration/right gluteal ulceration-clean    Recommendations   · The patient is on empiric antimicrobial therapy with cefepime. · The question is when time is whether gram-positive coverage is warranted given the wounds and given that these are not acutely infected and the MRSA PCR is negative I would not feel inclined to add MRSA coverage at this time. · Blood cultures have been sent and so far remain negative  · Continue supportive care. With noninvasive ventilation, pressors as required. · It is my understanding that the patient will start hemodialysis    Chief complaint/reason for consultation:   Multiple wounds, shock, leukocytosis    History of Present Illness:   Alvaro Reed is a 66y.o.-year-old female who was initially admitted on 6/27/2022. Farhan Pearl has a history of diabetes mellitus type 2 with associated chronic kidney disease stage IV, hyperlipidemia, coronary artery disease, hypertension, asthma, previous CVA, acute on chronic combined systolic and diastolic heart failure, chronic left lower extremity wound for which she follows in the wound care center, meningioma    The patient was short of breath at home O2 sats in the low 90% on oxygen. The patient was reported to be more fatigued than normal and the heart rate was found to be 35 and the patient was started on transcutaneous monitoring and atropine was given by EMS.   The patient had a recent 48-hour Holter monitor showing sinus rhythm with some PACs earlier this month.  The patient has required epinephrine for blood pressure support as this was switched to dopamine and Levophed which she continues currently. She is on BiPAP and currently nonconversant. She has developed acute kidney injury and will be started on hemodialysis. The chest x-ray shows findings consistent with pulmonary edema/congestion and the patient has been lethargic since admission. I was asked to evaluate for leukocytosis and bilateral lower extremity leg ulcers. The patient    I have personally reviewed the past medical history, past surgical history, medications, social history, and family history, and I have updated the database accordingly.   Past Medical History:     Past Medical History:   Diagnosis Date    Acute CVA (cerebrovascular accident) ([address] 7/25/2020    Acute kidney injury superimposed on chronic kidney disease ([address] 6/8/2022    Acute on chronic combined systolic (congestive) and diastolic (congestive) heart failure ([address] 2/6/2022    JOY (acute kidney injury) ([address] 1/15/2022    Altered mental status 5/26/2021    Anticoagulated 6/8/2022    Arthritis     Asthma     Bradycardia 6/12/2022    Brain mass     Cellulitis and abscess     Cellulitis and abscess of unspecified site     Cellulitis of both lower extremities 5/26/2021    Cellulitis of left lower extremity 7/26/2020    Cellulitis of lower extremity 10/4/2020    CHF (congestive heart failure) (HCC)     Chronic kidney disease, unspecified     CKD stage 4 secondary to hypertension ([address] 2/20/2020    Coronary atherosclerosis of native coronary artery     Elevated LFTs 2/25/2021    Essential hypertension 7/25/2020    Essential hypertension, malignant     Hallucinations 2/18/2021    Hard of hearing     Head contusion 9/9/2020    Hypertensive emergency 7/25/2020    Hypertensive urgency with joy 6/9/2022    Hypokalemia 9/9/2020    Hypomagnesemia 5/26/2021    Iron deficiency anemia, unspecified     Meningioma [facility] 2021    Myocardial infarction ([facility]     Other and unspecified hyperlipidemia     Pure hypercholesterolemia     Stage 4 chronic kidney disease ([facility]     Toxic metabolic encephalopathy     Type 2 diabetes mellitus with stage 4 chronic kidney disease, without long-term current use of insulin (HCC)     Type II or unspecified type diabetes mellitus without mention of complication, not stated as uncontrolled     Unspecified asthma(493.90)     Unspecified venous (peripheral) insufficiency     Unspecified vitamin D deficiency     UTI (urinary tract infection) 2021     Past Surgical  History:     Past Surgical History:   Procedure Laterality Date    COLONOSCOPY N/A 3/15/2022    COLONOSCOPY DIAGNOSTIC performed by Fernando Marquez MD at [facility]      x 3, Dr. Dimitry Singletary  3/7/2022         THORACENTESIS  3/10/2022         TONSILLECTOMY AND ADENOIDECTOMY      UPPER GASTROINTESTINAL ENDOSCOPY N/A 3/15/2022    EGD BIOPSY performed by Fernando Marquez MD at [address] [place] [address]     Medications:      sodium polystyrene  15 g Oral Once    cefepime  1,000 mg IntraVENous Q24H    fentaNYL        sodium chloride flush  5-40 mL IntraVENous 2 times per day    budesonide-formoterol  2 puff Inhalation BID    aspirin EC  81 mg Oral Daily    atorvastatin  40 mg Oral Daily    magnesium oxide  400 mg Oral BID    miconazole   Topical BID    ferrous sulfate  325 mg Oral Daily with breakfast    albuterol  2.5 mg Nebulization TID     Social History:     Social History     Socioeconomic History    Marital status:       Spouse name: Not on file    Number of children: 10    Years of education: Not on file    Highest education level: Not on file   Occupational History    Not on file   Tobacco Use    Smoking status: Former Smoker     Packs/day: 0.25     Years: 10.00     Pack years: 2.50     Quit date: 2000     Years since quittin.5    Smokeless tobacco: Never Used   Vaping Use    Vaping Use: Never used   Substance and Sexual Activity    Alcohol use: Not Currently     Alcohol/week: 0.0 standard drinks    Drug use: No    Sexual activity: Yes     Partners: Female   Other Topics Concern    Not on file   Social History Narrative    Not on file     Social Determinants of Health     Financial Resource Strain: Low Risk     Difficulty of Paying Living Expenses: Not very hard   Food Insecurity: No Food Insecurity    Worried About Running Out of Food in the Last Year: Never true    Holden of Food in the Last Year: Never true   Transportation Needs:     Lack of Transportation (Medical): Not on file    Lack of Transportation (Non-Medical):  Not on file   Physical Activity:     Days of Exercise per Week: Not on file    Minutes of Exercise per Session: Not on file   Stress:     Feeling of Stress : Not on file   Social Connections:     Frequency of Communication with Friends and Family: Not on file    Frequency of Social Gatherings with Friends and Family: Not on file    Attends Quaker Services: Not on file    Active Member of Clubs or Organizations: Not on file    Attends Club or Organization Meetings: Not on file    Marital Status: Not on file   Intimate Partner Violence:     Fear of Current or Ex-Partner: Not on file    Emotionally Abused: Not on file    Physically Abused: Not on file    Sexually Abused: Not on file   Housing Stability:     Unable to Pay for Housing in the Last Year: Not on file    Number of [place] in the Last Year: Not on file    Unstable Housing in the Last Year: Not on file     Family History:     Family History   Problem Relation Age of Onset    Diabetes Mother     Heart Disease Mother     Heart Disease Father     Diabetes Sister     High Blood Pressure Sister     Diabetes Maternal Grandmother       Allergies:   Latex, Aleve [naproxen sodium], Pioglitazone, Claritin [loratadine], Keflex [cephalexin], and Lisinopril Review of Systems:   Unable to obtain the patient is on BiPAP    Physical Examination :   /79   Pulse 70   Temp 98.2 °F (36.8 °C) (Bladder)   Resp 24   Wt 151 lb 1.7 oz (68.5 kg)   SpO2 97%   BMI 30.52 kg/m²     Temperature Range: Temp: 98.2 °F (36.8 °C) Temp  Av.3 °F (36.3 °C)  Min: 95.5 °F (35.3 °C)  Max: 99.9 °F (37.7 °C)  General Appearance: The patient is lethargic and is currently on BiPAP  Head: Normocephalic, without obvious abnormality, atraumatic  Eyes: Sluggishly reactive to light no scleral icterus noted  ENT: Dry oral mucosa not well evaluated  Neck: Supple, without lymphadenopathy. Pulmonary/Chest: Clear to auscultation, without wheezes, rales, or rhonchi  Cardiovascular: Systolic ejection murmur appreciated  Abdomen: Obese abdomen with positive bowel sounds and is soft, nontender, nondistended. Extremities: The patient has dressings in the lower extremities bilaterally recently done by the wound care nurse. Neurologic: Lethargic  Skin: Dressings are in place with pictures from the wound care nurse were reviewed. Coccygeal ulceration        Right lower extremity        Left lower extremity        Medical Decision Making:   I have independently reviewed/ordered the following labs:  CBC with Differential:   Recent Labs     22  1244 22  1244 22  1910 22  0427   WBC 10.4  --   --  21.4*   HGB 10.5*   < > 11.2* 10.2*   HCT 35.2*   < > 39.4 35.0*     --   --  312   LYMPHOPCT 2*  --   --  2*   MONOPCT 5  --   --  10    < > = values in this interval not displayed.      BMP:   Recent Labs     22  0427 22  0902    140   K 5.6* 5.4*   * 108*   CO2 17* 19*   BUN 69* 65*   CREATININE 2.35* 2.49*     Hepatic Function Panel:   Recent Labs     22  1459   PROT 5.3*   LABALBU 2.6*   BILIDIR <0.08   IBILI Can not be calculated   BILITOT <0.10*   ALKPHOS 162*   ALT 25   AST 38*       Lab Results   Component Value Date    PROCAL 0.11 06/27/2022    PROCAL 0.26 03/07/2022       Lab Results   Component Value Date    CRP <3.0 03/01/2022    CRP <3.0 02/07/2022     Lab Results   Component Value Date    FERRITIN 13 03/01/2022    FERRITIN 155 07/25/2020     Lab Results   Component Value Date    FIBRINOGEN 610 07/25/2020     Lab Results   Component Value Date    DDIMER 0.71 07/25/2020     Lab Results   Component Value Date     03/10/2022     07/25/2020       Lab Results   Component Value Date    SEDRATE 7 10/17/2017       Lab Results   Component Value Date    COVID19 Not Detected 06/27/2022    COVID19 Not Detected 06/12/2022    COVID19 Not Detected 03/01/2022    COVID19 Not Detected 02/06/2022    COVID19 Not Detected 07/25/2020     No results found for requested labs within last 30 days. Imaging Studies:   CT OF THE HEAD WITHOUT CONTRAST  6/27/2022 10:20 am FINDINGS:   No acute intracranial hemorrhage or hydrocephalus. Background mild chronic small vessel white matter changes with remote lacunar insults in the right caudate and left basal ganglia, unchanged from prior. 2.6 cm anterior right temporal meningioma with suggested mild increase in the vasogenic edema along the anterior right temporal lobe relative to 4 months prior. This could be further assessed with a contrast-enhanced brain MRI. ONE XRAY VIEW OF THE CHEST 6/28/2022 9:26 am   FINDINGS:  Slight improvement in still moderate diffuse bilateral interstitial infiltrates related to improving pulmonary edema with decreasing still moderate right and mild to moderate left pleural effusions         ONE XRAY VIEW OF THE CHEST 6/27/2022 9:57 am   FINDINGS:   Mixed interstitial and alveolar opacities throughout both lungs, increased from 3 weeks prior. Correlate for edema or contributory infection. Bilateral small left greater than right pleural effusions which are also increased from 3 weeks prior. Unchanged cardiomegaly.        Cultures:     Culture, Blood 1 [[id number]] Collected: 06/27/22 1311   Order Status: Completed Specimen: Blood Updated: 06/28/22 1327    Specimen Description . BLOOD    Special Requests NOT GIVEN    Culture NO GROWTH 1 DAY   Culture, Blood 1 [[id number]] Collected: 06/27/22 1245   Order Status: Completed Specimen: Blood Updated: 06/28/22 1326    Specimen Description . BLOOD    Special Requests L AC 1ML    Culture NO GROWTH 1 DAY   MRSA DNA Probe, Nasal [[id number]] Collected: 06/27/22 1742   Order Status: Completed Specimen: Nasal Updated: 06/28/22 1041    Specimen Description . NASAL SWAB    MRSA, DNA, Nasal NEGATIVE    Comment: NEGATIVE:  MRSA DNA not detected by nucleic acid amplification.                                                     Results should be used as an adjunct to nosocomial control efforts to identify patients   needing enhanced precautions.     The test is not intended to identify patients with staphylococcal infections.  Results   should not be used to guide or monitor treatment for MRSA infections. Culture, Urine [[id number]] Collected: 06/27/22 2007   Order Status: No result Specimen: Urine Updated: 06/27/22 2049   MRSA DNA Probe, Nasal [[id number]]    Order Status: Canceled Specimen: Nares    COVID-19, Rapid [[id number]] Collected: 06/27/22 1344   Order Status: Completed Specimen: Nasopharyngeal Swab Updated: 06/27/22 1411    Specimen Description . NASOPHARYNGEAL SWAB    SARS-CoV-2, Rapid Not Detected    Comment:        Rapid NAAT:  The specimen is NEGATIVE for SARS-CoV-2, the novel coronavirus associated with   COVID-19.         The ID NOW COVID-19 assay is designed to detect the virus that causes COVID-19 in patients   with signs and symptoms of infection who are suspected of COVID-19. An individual without symptoms of COVID-19 and who is not shedding SARS-CoV-2 virus would   expect to have a negative (not detected) result in this assay.    Negative results should be treated as presumptive and, if inconsistent with clinical signs and symptoms or necessary for patient management,   should be tested with an alternative molecular assay. Negative results do not preclude   SARS-CoV-2 infection and   should not be used as the sole basis for patient management decisions.         Fact sheet for Healthcare Providers: Lance   Fact sheet for Patients: Lance           Methodology: Isothermal Nucleic Acid Amplification       Culture, Urine [[id number]] Collected: 06/27/22 1248       Thank you for allowing us to participate in the care of this patient. Please call with questions. Electronically signed by Daisy Carter MD on 6/28/2022 at 1:42 PM      [facility]  Daisy Carter MD  [employer] messaging  OFFICE: [phone]      This note is created with the assistance of a speech recognition program.  While intending to generate a document that actually reflects the content of the visit, the document can still have some errors including those of syntax and sound a like substitutions which may escape proof reading. In such instances, actual meaning can be extrapolated by contextual diversion.

## 2022-06-28 NOTE — CONSULTS
[facility] Neurology   IN-PATIENT SERVICE      NEUROLOGY CONSULT  NOTE            Date:   6/28/2022  Patient name:  Hayley Salcido  Date of admission:  6/27/2022  YOB: 1950      Chief Complaint:     Chief Complaint   Patient presents with    Bradycardia       Reason for Consult: Altered mental status, abnormal CT of head with known meningioma anterior right temporal fossa    History of Present Illness: The patient is a 66 y.o. female who presents with Bradycardia  . The patient was seen and examined and the chart was reviewed. This patient is admitted with bradycardia. She has multiple medical issues that have resulted in multiple hospital admissions over the past year and at least 3 admissions since the first of this year. Patient had a recent admission to [facility] for management of CHF, A. fib on AC, hypertension, peripheral vascular disease, DM and CKI. Seeing Dr. Dirk Magallon note dated 6/15/2022 for details. Patient is also been seen by neurology and number of occasions over the past 2 or 3 years for altered mental status, encephalopathy, known right anterior temporal fossa meningioma not resected, mild surrounding edema, EEG last performed February 2021 showing slowing particularly right temporal leads with some sharp waves without clear-cut epileptiform activity. Patient has no history of seizures and is not on AEDs. CT of the head was performed on 6/27/2022 at 10:20 AM which demonstrated the following:    Impression   No acute intracranial hemorrhage or hydrocephalus.  Background mild chronic   small vessel white matter changes with remote lacunar insults in the right   caudate and left basal ganglia, unchanged from prior.       2.6 cm anterior right temporal meningioma with suggested mild increase in the   vasogenic edema along the anterior right temporal lobe relative to 4 months   prior.  This could be further assessed with a contrast-enhanced brain MRI. I personally reviewed the images and note that the change in vasogenic edema may be minimal.  There also appears to be fairly extensive atrophy of the brain as well as small vessel disease as noted by radiology above. Patient appears to be prone to altered mental status related to exacerbations and medical disease such as bradycardia, increasing renal failure and bilateral lower leg wounds due to peripheral vascular disease. She is currently in the ICU on a O2 mask and receiving dopamine for management of bradycardia. Nephrology is in the process of evaluating her current renal function. Patient is demonstrated no localizing signs or evidence of epileptiform activity clinically according to notes on chart and nurses who are in attendance. Patient's daughter is present. She also indicates that no particular epileptiform activity or focal signs have been noted. She does indicate that the patient was able to stand with assistance yesterday and able to walk to the toilet. She began having more significant changes leading to yesterday's admission. Those changes included decreasing awareness and altered mental status.     Past Medical History:     Past Medical History:   Diagnosis Date    Acute CVA (cerebrovascular accident) ([address] 7/25/2020    Acute kidney injury superimposed on chronic kidney disease ([address] 6/8/2022    Acute on chronic combined systolic (congestive) and diastolic (congestive) heart failure ([address] 2/6/2022    JOY (acute kidney injury) ([address] 1/15/2022    Altered mental status 5/26/2021    Anticoagulated 6/8/2022    Arthritis     Asthma     Bradycardia 6/12/2022    Brain mass     Cellulitis and abscess     Cellulitis and abscess of unspecified site     Cellulitis of both lower extremities 5/26/2021    Cellulitis of left lower extremity 7/26/2020    Cellulitis of lower extremity 10/4/2020    CHF (congestive heart failure) (HCC)     Chronic kidney disease, unspecified     CKD stage 4 secondary to hypertension ([facility] 2/20/2020    Coronary atherosclerosis of native coronary artery     Elevated LFTs 2/25/2021    Essential hypertension 7/25/2020    Essential hypertension, malignant     Hallucinations 2/18/2021    Hard of hearing     Head contusion 9/9/2020    Hypertensive emergency 7/25/2020    Hypertensive urgency with nyla 6/9/2022    Hypokalemia 9/9/2020    Hypomagnesemia 5/26/2021    Iron deficiency anemia, unspecified     Meningioma ([facility] 2/25/2021    Myocardial infarction ([facility]     Other and unspecified hyperlipidemia     Pure hypercholesterolemia     Stage 4 chronic kidney disease ([facility]     Toxic metabolic encephalopathy 2/94/8872    Type 2 diabetes mellitus with stage 4 chronic kidney disease, without long-term current use of insulin (HCC)     Type II or unspecified type diabetes mellitus without mention of complication, not stated as uncontrolled     Unspecified asthma(493.90)     Unspecified venous (peripheral) insufficiency     Unspecified vitamin D deficiency     UTI (urinary tract infection) 2/18/2021        Past Surgical History:     Past Surgical History:   Procedure Laterality Date    COLONOSCOPY N/A 3/15/2022    COLONOSCOPY DIAGNOSTIC performed by Lydia Amezcua MD at [facility].      x 3, Dr. Cindy Meek  3/7/2022         THORACENTESIS  3/10/2022         TONSILLECTOMY AND ADENOIDECTOMY      UPPER GASTROINTESTINAL ENDOSCOPY N/A 3/15/2022    EGD BIOPSY performed by Lydia Amezcua MD at [address] [place] [address]        Medications Prior to Admission:     Prior to Admission medications    Medication Sig Start Date End Date Taking?  Authorizing Provider   ferrous sulfate (IRON 325) 325 (65 Fe) MG tablet Take 1 tablet by mouth daily (with breakfast) 6/22/22   Madison Hatchet, MD   apixaban (ELIQUIS) 2.5 MG TABS tablet Take 1 tablet by mouth 2 times daily 6/22/22   Madison Hatchet, MD   apixaban (ELIQUIS) 2.5 MG TABS tablet Take 1 tablet by mouth 2 times daily 6/22/22   Brielle Sargent MD   ferrous sulfate (IRON 325) 325 (65 Fe) MG tablet Take 1 tablet by mouth daily (with breakfast) 6/22/22   Brielle Sargent MD   fluticasone-salmeterol (ADVAIR HFA) 115-21 MCG/ACT inhaler Inhale 2 puffs into the lungs 2 times daily 6/22/22   Brielle Sargent MD   metoprolol tartrate (LOPRESSOR) 25 MG tablet Take 1 tablet by mouth 2 times daily 6/17/22   Brielle Sargent MD   aspirin EC 81 MG EC tablet Take 1 tablet by mouth daily 6/17/22   Brielle Sargent MD   hydrALAZINE (APRESOLINE) 25 MG tablet Take 1 tablet by mouth 3 times daily 6/17/22   Brielle Sargent MD   albuterol sulfate HFA (PROAIR HFA) 108 (90 Base) MCG/ACT inhaler Inhale 2 puffs into the lungs every 6 hours as needed for Wheezing or Shortness of Breath (cough) 6/17/22   Brielle Sargent MD   metoprolol tartrate (LOPRESSOR) 25 MG tablet Take 1 tablet by mouth 2 times daily 6/17/22   Brielle Sargent MD   aspirin EC 81 MG EC tablet Take 1 tablet by mouth daily 6/17/22   Brielle Sargent MD   hydrALAZINE (APRESOLINE) 25 MG tablet Take 1 tablet by mouth 3 times daily 6/17/22   Brielle Sargent MD   albuterol sulfate HFA (PROAIR HFA) 108 (90 Base) MCG/ACT inhaler Inhale 2 puffs into the lungs every 6 hours as needed for Wheezing or Shortness of Breath (cough) 6/17/22   Brielle Sargent MD   fluticasone-salmeterol (ADVAIR HFA) 636-90 MCG/ACT inhaler Inhale 2 puffs into the lungs 2 times daily Maintenance inhaler 6/17/22   Brielle Sargent MD   amLODIPine (NORVASC) 10 MG tablet Take 1 tablet by mouth nightly 6/11/22   Samm Cheatham MD   Blood Pressure KIT Diagnosis: HTN. Needs to check blood pressure 1-2 times a day until stable, then once a day.  Goal blood pressure less than 135/85, and above 110/60. 5/26/22   Brielle Sargent MD   vitamin D (ERGOCALCIFEROL) 1.25 MG (16604 UT) CAPS capsule Take 1 capsule by mouth once a week Sundays 4/28/22 Shila Martin MD   amiodarone (CORDARONE) 200 MG tablet Take 1 tablet by mouth daily 4/15/22   Shila Martin MD   Nebulizers (COMPRESSOR/NEBULIZER) MISC Nebulizer with compressor. Disposable Med Nebs 2 /month. Reusable Med Nebs 1 per 6 months. Aerosol Mask 1 per month. Replacement Filters 2 per month. All other related supplies as needed per month. Medications being used: Albuterol . 083 unit dose vial. Frequency: every 6 hrs x 4/day . Length of Need: 1  Patient not taking: Reported on 6/17/2022 2/22/22   Grace Rivero MD   albuterol (PROVENTIL) (2.5 MG/3ML) 0.083% nebulizer solution Take 3 mLs by nebulization every 6 hours as needed for Wheezing or Shortness of Breath  Patient not taking: Reported on 6/17/2022 2/22/22   Grace Rivero MD   Misc. Devices (ADJUST FOLD CANE/YORK HANDLE) MISC Use daily for walking 2/22/22   Grace Rivero MD   Handicap Placard MISC by Does not apply route Expires on 12/30/25 2/22/22   Grace Rivero MD   atorvastatin (LIPITOR) 40 MG tablet Take 1 tablet by mouth once daily 2/15/22   Shila Martin MD   desloratadine (CLARINEX) 5 MG tablet Take 1 tablet by mouth once daily 2/15/22   Shila Martin MD   FreeStyle Lancets MISC 1 each by Does not apply route daily Patient needs to contact office before any further refills will be approved 2/15/22   Shila Martin MD   magnesium oxide (MAG-OX) 400 (241.3 Mg) MG TABS tablet Take 1 tablet by mouth twice daily 2/15/22   Shila Martin MD   miconazole (MICOTIN) 2 % powder Apply topically 2 times daily UNDER THE SKIN FOLDS LONG TERM 2/15/22   Shila Martin MD   Multiple Vitamins-Minerals (THERAPEUTIC MULTIVITAMIN-MINERALS) tablet Take 1 tablet by mouth daily 2/15/22   Shila Martin MD   blood glucose test strips (FREESTYLE LITE) strip 1 each by In Vitro route daily As needed.  3/19/21   Shila Martin MD   Blood Pressure KIT 1 kit by Does not apply route three times daily  Patient not taking: Reported on 19   Tammy Lynn MD   blood glucose monitor kit and supplies 1 kit by Other route three times daily Dispense Butterfly Elite CBG Device 19   Erin Olson MD        Allergies:     Latex, Aleve [naproxen sodium], Pioglitazone, Claritin [loratadine], Keflex [cephalexin], and Lisinopril    Social History:     Tobacco:    reports that she quit smoking about 22 years ago. She has a 2.50 pack-year smoking history. She has never used smokeless tobacco.  Alcohol:      reports previous alcohol use. Drug Use:  reports no history of drug use. Family History:     Family History   Problem Relation Age of Onset    Diabetes Mother     Heart Disease Mother     Heart Disease Father     Diabetes Sister     High Blood Pressure Sister     Diabetes Maternal Grandmother      Review of Systems:     Patient is unable to provide meaningful information on her own behalf at this time      Physical Exam:   /87   Pulse 80   Temp 99.9 °F (37.7 °C) (Bladder)   Resp 25   Wt 151 lb 1.7 oz (68.5 kg)   SpO2 94%   BMI 30.52 kg/m²   Temp (24hrs), Av.9 °F (36.1 °C), Min:95.5 °F (35.3 °C), Max:99.9 °F (37.7 °C)    General examination:      General Appearance: Somnolent arouses very briefly to verbal stimuli  HEENT: Normocephalic, atraumatic  Neck: No obvious lesion noted  Lungs: O2 mask on. Respirations regular unlabored  Cardiovascular: Heart rate 80 currently not bradycardic on dopamine. EKG preliminary report indicates sinus rhythm replaced A. fib  Abdomen: Obese  Skin: Wounds over anterior shins both legs currently being attended to by wound service  Extremities: Relatively inactive  Psych: Drowsy      Neurological examination:    Mental status   Drowsy. Arouses briefly to loud verbal and shaking. Rapidly lapses into somnolence. Cranial nerves   II -resist eye opening passively. Pupils 6 mm in size. III, IV, VI - extraocular muscles intact gaze is conjugate in primary position. Normal movement from side to side noted   V - normal facial sensation    to tactile stimuli                                      VII -symmetric expression and grimace                                            VIII -suspect hearing impairment                                                           IX, X -not testable    hypophonic                                        XI -able to turn head from side to side                                                  XII -not testable     Motor function   appears diffusely debilitated deconditioned. Can move arms. Legs appeared generally atrophied. An active at the moment that the wounds are being tended to  No tremors                      Sensory function  difficult to assess but does appear to respond to tactile stimuli all 4 extremities     Cerebellar Intact finger-nose-finger testing. Intact heel-shin testing. No dysdiadochokinesia present. Reflex function  limited response at biceps. Knees and toes cannot be assessed as wounds are being tended   Gait                   nonambulatory at this time             Diagnostics:      Laboratory Testing:  CBC:   Recent Labs     06/27/22  1244 06/27/22  1910 06/28/22 0427   WBC 10.4  --  21.4*   HGB 10.5* 11.2* 10.2*     --  312     BMP:    Recent Labs     06/27/22  1908 06/28/22  0427 06/28/22  0902    138 140   K 5.5* 5.6* 5.4*    108* 108*   CO2 18* 17* 19*   BUN 65* 69* 65*   CREATININE 2.17* 2.35* 2.49*   GLUCOSE 122* 165* 68*         Lab Results   Component Value Date    CHOL 135 12/06/2021    LDLCHOLESTEROL 50 12/06/2021    HDL 59 12/06/2021    TRIG 132 12/06/2021    ALT 25 06/27/2022    AST 38 (H) 06/27/2022    TSH 9.54 (H) 06/27/2022    INR 1.0 06/14/2022    LABA1C 4.6 03/31/2022    LABMICR 9,991 (H) 04/08/2022    BQHGELOW42 1927 (H) 06/01/2022       No results found for: PHENYTOIN, PHENYTOIN, VALPROATE, CBMZ       Impression:      1. Encephalopathy. Metabolic. Recurrent.   2. Admitted with bradycardia is currently being managed. 3. Abnormal CT head meningioma right anterior temporal fossa. Possible minimal increase in edema noted by radiology. 4. Significant cerebral atrophy noted. 5. Small vessel cerebrovascular disease with prior remote stroke noted  6. Progressive renal failure  7. Peripheral vascular disease with active wounds both lower legs over shins    Plan:      Radiology has recommended correlation with MRI however this may be deferred for the time being as it is noncritical and patient has multiple other medical issues that require immediate attention.  Continue supportive medical care at this time. [facility] Neurology will follow up on this case tomorrow. Thank you for this very interesting consultation.       Electronically signed by Sergio Cheek MD on 6/28/2022 at 10:24 AM      Sergio Cheek MD  [facility]  Neurology

## 2022-06-28 NOTE — PLAN OF CARE
Tried temporary dialysis catheter in the right IJ for two attempts, guide wire is hitting the wall and looping within as a result there is resistance , I tried lowering the angle, pulling the needle out a bit, still looping, so took the needle out and attempted again the second attempt, same thing happened again. So aborted the attempt. Hemostasis achieved by compression. Left femoral temp dialysis catheter attempted by Dr. Soheila Robbins , guide wire kinked, catheter clotted, so this attempt is aborted. Plan to send to IR for temp dialysis catheter. Updated the daughter on the above. Patient is somnolent, will get blood gas. Patient's daughter okay for intubation if needed.

## 2022-06-28 NOTE — BRIEF OP NOTE
Brief Postoperative Note Non Tunneled HD Catheter    Mariajose Vasquez  YOB: 1950  [id number]    Pre-operative Diagnosis: Acute Renal Failure      Post-operative Diagnosis: Same    Procedure: Non tunneled Dialysis Catheter    Anesthesia: 1%Lidocaine     Surgeons/Assistants: Cindy Gleason PA-C    Estimated Blood Loss: Minimal    Complications: none    13 Fr x 15 cm non tunneled HD Catheter placed via Site:  Right Internal Jugular Vein. Sutured in place and sterile dressing applied. Locked with Heparin  May use HD cath for dialysis.     Electronically signed by SEBASTIAN Tena on 6/28/2022 at 4:47 PM

## 2022-06-28 NOTE — OR NURSING
PT ARRIVES TO IR ON VENT AND MONITOR FOR TEMP CATH. ASSISTED SMOOTHLY SUPINE TO IR TABLE. ALL APPROPRIATE SAFETY/SUPPORT DEVICES IN PLACE. MONITOR CONTINUED.

## 2022-06-28 NOTE — PROCEDURES
PROCEDURE NOTE - EMERGENCY INTUBATION    PATIENT NAME: Justyn Myles  MEDICAL RECORD NO. [id number]  DATE: 6/28/2022  ATTENDING PHYSICIAN: Dr. Cristofer Brumfield DIAGNOSIS:  Acute Respiratory Failure  POSTOPERATIVE DIAGNOSIS:  Same  PROCEDURE PERFORMED:  Emergency endotracheal intubation  PERFORMING PHYSICIAN: Kelly Adams MD  COMPLICATIONS:  None    MEDICATIONS: etomidate intravenously, 2mg iv versed push    DISCUSSION:  Justyn Myles is a 66y.o.-year-old female who requires intubation and ventilatory support due to Respiratory failure. The history and physical examination were reviewed and confirmed. CONSENT: obtained from daughters     PROCEDURE:  A timeout was initiated by the bedside nurse and was confirmed by those present. The patient was placed in the appropriate position. Cricoid pressure was not required. Intubation was performed via glidoscope guided laryngoscopy a 7.0 cuffed endotracheal tube. Lots of secretions thick in nature, suctioned out to gain visibility. The cuff was then inflated and the tube was secured appropriately at a distance of 23 cm to the dental ridge. Initial confirmation of placement included bilateral breath sounds, an end tidal CO2 detector and tube fogging. A chest x-ray to verify  placement of the tube is close to kumar, need retracting 2cm above. Will follow up on this. The patient tolerated the procedure well. Kelly Adams MD  4:12 PM, 6/28/22    I was present and supervise the procedure.   No immediate complications    Aleksandr Dotson MD6/28/20225:51 PM

## 2022-06-28 NOTE — PROGRESS NOTES
Attending Physician Statement  I have discussed the care of [address] [place] [address] Drive, including pertinent history and exam findings with the resident. I have reviewed the key elements of all parts of the encounter with the resident. I have seen and examined the patient with the resident. I agree with the assessment and plan and status of the problem list as documented. I have evaluated the patient multiple times during the course of the day. Patient remained on dopamine and started on Levophed drip off epinephrine drip. She has been somnolent intermittently she wake up with eyes opening but getting more somnolent lethargic and at times difficult to arouse. Patient intermittently had blood sugar of 36 she had received insulin dextrose initially this morning for potassium repeat blood sugar after dextrose was repeated was given was 100. ABG was done while patient was on BiPAP 10/5 BiPAP was increased to 14/6 and she is initially on 40% now 70% FiO2. ABG was 7.2 0/57/53/21 she had altered mental status increasing PCO2 worsening hypoxia. Will need intubation for hypoxia hypercapnia and for airway protection and aspiration precaution. Discussed with the daughters and explained current condition they understand that she need hemodialysis, they do not want her to proceed with intubation. Discussing with the daughters according to daughter patient wishes were not to have prolonged ventilatory support or prolonged artificial feeding. Hemodialysis catheter was tried at bedside by critical care resident but was not successful IR was consulted for placement of dialysis catheter. Will start hemodialysis after dialysis catheter. Patient was intubated I was present and supervised the procedure no complication. Ventilator setting PRVC/20/380/5/1 100% will wean FiO2 and get blood gas in  Chest x-ray postintubation shows endotracheal tube was close to kumar about 1 cm and will withdraw endotracheal tube 2 cm.   Repeat chest x-ray tomorrow. Additional critical care time caring for this patient with life threatening, unstable organ failure, including direct patient contact, management of life support systems, review of data including imaging and labs, discussions with other team members and physicians at least 40 min so far today, excluding procedures. Please note that this chart was generated using voice recognition Dragon dictation software. Although every effort was made to ensure the accuracy of this automated transcription, some errors in transcription may have occurred.      Shary Mcburney, MD  6/28/2022 5:43 PM

## 2022-06-28 NOTE — PROGRESS NOTES
[facility] Nurse  Consult Note       NAME:  Brielle Mcconnell  MEDICAL RECORD NUMBER:  [id number]  AGE: 66 y.o. GENDER: female  : 1950  TODAY'S DATE:  2022    Subjective:     Reason for WOCN Evaluation and Assessment: \"b/l lower extremity wounds\"      Brielle Mcconnell is a 66 y.o. female referred by:   [x] Physician  [] Nursing  [] Other:     Wound Identification:  Wound Type: venous and pressure  Contributing Factors: venous stasis, chronic pressure, decreased mobility, shear force and decreased tissue oxygenation     Twice weekly Unna's Boots dressing initiated 2022 by the [facility] due to rapid recurrence of venous stasis wounds when standard compression garments trialed. Patient also developed a sacral pressure injury prior to admission. Objective:      /79   Pulse 70   Temp 98.2 °F (36.8 °C) (Bladder)   Resp 24   Wt 151 lb 1.7 oz (68.5 kg)   SpO2 97%   BMI 30.52 kg/m²   Jackson Risk Score: Jackson Scale Score: 13    LABS    CBC:   Lab Results   Component Value Date    WBC 21.4 2022    RBC 3.29 2022    HGB 10.2 2022     CMP:  Albumin:    Lab Results   Component Value Date    LABALBU 2.6 2022     PT/INR:    Lab Results   Component Value Date    PROTIME 10.8 2022    INR 1.0 2022     HgBA1c:    Lab Results   Component Value Date    LABA1C 4.6 2022     PTT: No components found for: LABPTT      Assessment:       Measurements:     22 1050   Wound 22 Right Wound #1 right lower  leg cluster- converged with #2   Date First Assessed: 22   Present on Hospital Admission: Yes  Wound Approximate Age at First Assessment (Weeks): 0.5 weeks  Primary Wound Type: Venous Ulcer  Wound Location Orientation: Right  Wound Description (Comments): Wound #1 right lower  . ..    Wound Image     Wound Etiology Venous   Dressing Status New dressing applied   Wound Cleansed Soap and water;Cleansed with saline Dressing/Treatment Hydrofiber Ag;ABD  (calamine Unna's boot)   Dressing Change Due 07/05/22   Wound Assessment Pink/red;Ruptured blister   Drainage Amount Small   Drainage Description Serosanguinous   Odor None   Marian-wound Assessment Blanchable erythema   Wound 05/20/22 Left;Proximal Wound # 4 Left lower lat leg   Date First Assessed: 05/20/22   Present on Hospital Admission: Yes  Wound Approximate Age at First Assessment (Weeks): 0.5 weeks  Primary Wound Type: Venous Ulcer  Wound Location Orientation: Left;Proximal  Wound Description (Comments): Wound # 4 Left. .. Wound Image      Wound Etiology Venous   Dressing Status New dressing applied   Wound Cleansed Soap and water;Cleansed with saline   Dressing/Treatment Hydrofiber Ag;ABD  (Calamine Unna's boot)   Dressing Change Due 07/05/22   Wound Assessment Pink/red;Superficial   Drainage Amount Small   Drainage Description Serosanguinous   Odor None   Marian-wound Assessment Intact; Blanchable erythema   Wound 06/27/22 Buttocks Right;Mid small open pale wound   Date First Assessed/Time First Assessed: 06/27/22 1630   Primary Wound Type: Pressure Injury  Location: Buttocks  Wound Location Orientation: Right;Mid  Wound Description (Comments): small open pale wound   Wound Image    Wound Etiology Pressure Stage 3   Dressing Status Reinforced dressing   Wound Cleansed Cleansed with saline   Dressing/Treatment Foam   Wound Length (cm) 5 cm  (two wound cluster measured together)   Wound Width (cm) 1.2 cm   Wound Surface Area (cm^2) 6 cm^2   Wound Assessment Superficial;Pink/red;Fibrinous   Drainage Amount Small   Drainage Description Serosanguinous   Odor None   Marian-wound Assessment Blanchable erythema; Intact            Response to treatment:  Well tolerated by patient. Plan:     Plan of Care: Turn every 2 hours  Float heels off of bed with pillows under calves    Use lift sling to reposition patient to minimize potential for shear injury.     Foam sacrum dressing to sacrococcygeal area. Peel back dressing, inspect skin beneath, re-secure. Change every 72 hours and prn wrinkles, soilage. Discontinue Sacral dressing if repeatedly soiled by incontinence. Routine incontinence care with incontinence barrier cloths   Moisture wicking under pads vs cloth backed briefs    BLE Unna's Boots compression dressings. Leave in place for one week. Notify the [id number] RN on-call via [address] [place] [address] for any concerns or drainage strike-through.        Specialty Bed Required : Yes   [] Low Air Loss   [] Pressure Redistribution  [] Fluid Immersion  [] Bariatric  [] Total Pressure Relief  [] Other:     Discharge Plan:  TBD    Patient/Caregiver Teaching:  D/w daughter at bedside  [] Indicates understanding       [] Needs reinforcement  [] Unsuccessful      [x] Verbal Understanding  [] Demonstrated understanding       [] No evidence of learning  [] Refused teaching         [] N/A       Electronically signed by Alisia Olmstead RN, CWON on 6/28/2022 at 1:38 PM

## 2022-06-28 NOTE — CONSULTS
Nephrology Consult Note    Reason for Consult:  Worsening kidney function, high potassium and acidosis  Requesting Physician:  Liana Zelaya    Chief Complaint:  Patient cannot voice a chief complaint she is short of breath on a non-rebreather mask  History Obtained From:  family member - daughter, electronic medical record    History of Present Illness: This is a 66 y.o. female who presents with worsening shortness of breath and was brought in by EMS. The patient's daughter followed her in her own car to the hospital.  She is present at the bedside today. The patient has a known history of type 2 diabetes mellitus for a significant period of time. She has a history of diabetic retinopathy. She has a history of having over 20 g of protein in the urine estimated. She was seen by another Dr. Beatriz Conde recently at [facility] and nephrology consultation. Negin Burton He suspected she had worsening chronic kidney disease. Labs from 6/15/2022 showed creatinine 2.34 with sodium 136, potassium 4.5 chloride 104 and CO2 20 with glucose 93 and phosphorus 4.4 with calcium 8.5. She has had some edema and lower extremity wounds it may be related to her proteinuria. She is a full code. There was some mention to her [facility] hospitalization about possible dialysis, but it was never undertaken. I am not able to elucidate at this time the patient's wishes for long-term dialysis if necessary. The patient did have her potassium treated medically during the nighttime hours. . her lab work this morning showed sodium 138 with potassium 5.6 chloride 108 CO2 17 with BUN 69 and creatinine 2.35 with calcium 8.6. No renal ultrasound on this visit. Hepatitis screen on this admission was non-reactive. Serum protein immunofixation was negative for monoclonal immunoglobulin. No recent renal ultrasound. Past medical history is extensive and is documented below. Medications are reviewed. Allergies are reviewed.   Social Procedure Laterality Date    COLONOSCOPY N/A 3/15/2022    COLONOSCOPY DIAGNOSTIC performed by Nikko uLcero MD at . [facility] [address] [facility]      x 3, Dr. Jo-Ann Avitia  3/7/2022         THORACENTESIS  3/10/2022         TONSILLECTOMY AND ADENOIDECTOMY      UPPER GASTROINTESTINAL ENDOSCOPY N/A 3/15/2022    EGD BIOPSY performed by Nikko Lucero MD [facility]       Current Medications:    sodium polystyrene (KAYEXALATE) 15 GM/60ML suspension 15 g, Once  cefepime (MAXIPIME) 1,000 mg in sterile water 10 mL IV syringe, Q24H  norepinephrine (LEVOPHED) 16 mg in sodium chloride 0.9 % 250 mL infusion, Continuous  glucose chewable tablet 16 g, PRN  dextrose bolus 10% 125 mL, PRN   Or  dextrose bolus 10% 250 mL, PRN  glucagon (rDNA) injection 1 mg, PRN  dextrose 5 % solution, PRN  sodium chloride flush 0.9 % injection 5-40 mL, 2 times per day  sodium chloride flush 0.9 % injection 5-40 mL, PRN  0.9 % sodium chloride infusion, PRN  ondansetron (ZOFRAN-ODT) disintegrating tablet 4 mg, Q8H PRN   Or  ondansetron (ZOFRAN) injection 4 mg, Q6H PRN  polyethylene glycol (GLYCOLAX) packet 17 g, Daily PRN  acetaminophen (TYLENOL) tablet 650 mg, Q6H PRN   Or  acetaminophen (TYLENOL) suppository 650 mg, Q6H PRN  heparin (porcine) injection 4,000 Units, PRN  heparin (porcine) injection 2,000 Units, PRN  heparin 25,000 units in dextrose 5 % 250 mL infusion (rate based), Continuous  budesonide-formoterol (SYMBICORT) 160-4.5 MCG/ACT inhaler 2 puff, BID  aspirin EC tablet 81 mg, Daily  atorvastatin (LIPITOR) tablet 40 mg, Daily  magnesium oxide (MAG-OX) tablet 400 mg, BID  miconazole (MICOTIN) 2 % powder, BID  albuterol sulfate HFA (PROVENTIL;VENTOLIN;PROAIR) 108 (90 Base) MCG/ACT inhaler 2 puff, Q6H PRN  ferrous sulfate (FE TABS 325) EC tablet 325 mg, Daily with breakfast  albuterol (PROVENTIL) nebulizer solution 2.5 mg, TID  DOPamine (INTROPIN) 400 mg in dextrose 5 % 250 mL infusion, Continuous  EPINEPHrine (EPINEPHrine HCL) 5 mg in dextrose 5 % 250 mL infusion, Continuous  morphine (PF) injection 2 mg, Q4H PRN        Allergies:  Latex, Aleve [naproxen sodium], Pioglitazone, Claritin [loratadine], Keflex [cephalexin], and Lisinopril    Social History:   Social History     Socioeconomic History    Marital status:      Spouse name: Not on file    Number of children: 10    Years of education: Not on file    Highest education level: Not on file   Occupational History    Not on file   Tobacco Use    Smoking status: Former Smoker     Packs/day: 0.25     Years: 10.00     Pack years: 2.50     Quit date: 2000     Years since quittin.5    Smokeless tobacco: Never Used   Vaping Use    Vaping Use: Never used   Substance and Sexual Activity    Alcohol use: Not Currently     Alcohol/week: 0.0 standard drinks    Drug use: No    Sexual activity: Yes     Partners: Female   Other Topics Concern    Not on file   Social History Narrative    Not on file     Social Determinants of Health     Financial Resource Strain: Low Risk     Difficulty of Paying Living Expenses: Not very hard   Food Insecurity: No Food Insecurity    Worried About Running Out of Food in the Last Year: Never true    Holden of Food in the Last Year: Never true   Transportation Needs:     Lack of Transportation (Medical): Not on file    Lack of Transportation (Non-Medical):  Not on file   Physical Activity:     Days of Exercise per Week: Not on file    Minutes of Exercise per Session: Not on file   Stress:     Feeling of Stress : Not on file   Social Connections:     Frequency of Communication with Friends and Family: Not on file    Frequency of Social Gatherings with Friends and Family: Not on file    Attends Shinto Services: Not on file    Active Member of Clubs or Organizations: Not on file    Attends Club or Organization Meetings: Not on file    Marital Status: Not on file   Intimate Partner Violence:     Fear of Current or Ex-Partner: Not on file    Emotionally Abused: Not on file    Physically Abused: Not on file    Sexually Abused: Not on file   Housing Stability:     Unable to Pay for Housing in the Last Year: Not on file    Number of Places Lived in the Last Year: Not on file    Unstable Housing in the Last Year: Not on file       Family History:   Family History   Problem Relation Age of Onset    Diabetes Mother     Heart Disease Mother     Heart Disease Father     Diabetes Sister     High Blood Pressure Sister     Diabetes Maternal Grandmother        Review of Systems:    Patient is unable to complete a review of systems.     Objective:  CURRENT TEMPERATURE:  Temp: 99.9 °F (37.7 °C)  MAXIMUM TEMPERATURE OVER 24HRS:  Temp (24hrs), Av.9 °F (36.1 °C), Min:95.5 °F (35.3 °C), Max:99.9 °F (37.7 °C)    CURRENT RESPIRATORY RATE:  Resp: 25  CURRENT PULSE:  Heart Rate: 80  CURRENT BLOOD PRESSURE:  BP: 116/87  24HR BLOOD PRESSURE RANGE:  Systolic (83SVO), EWX:125 , Min:83 , WEV:539   ; Diastolic (59ITT), UOW:86, Min:29, Max:127    24HR INTAKE/OUTPUT:      Intake/Output Summary (Last 24 hours) at 2022 1031  Last data filed at 2022 0800  Gross per 24 hour   Intake 1443.35 ml   Output 97 ml   Net 1346.35 ml       Physical Exam:  General appearance: Trying to rest, uncomfortable, increased work of breathing on non-rebreather mask  Skin: warm and dry, no rash or erythema  Eyes: normal conjunctiva, no icterus      Neck: no significant JVD appreciated Pulmonary: bilateral air entry and clear to auscultation bilaterally without wheezes or rales or rhonchi  Cardiovascular: Regular rate and rhythm positive S1 and S2 and no rubs  Abdomen: Soft and non-distended with positive bowel sounds  Extremities: mild lower extremity edema below the knees with wounds       Labs:   CBC:   Recent Labs     22  1244 22  1910 22  0427   WBC 10.4  --  21.4*   RBC 3.41*  --  3.29*   HGB 10.5* 11.2* 10.2*   HCT 35.2* 39.4 35.0*   .2*  --  106.4*   MCH 30.8  --  31.0   MCHC 29.8  --  29.1   RDW 16.8*  --  16.6*     --  312   MPV 10.2  --  10.1      BMP:   Recent Labs     06/27/22  1908 06/28/22  0427 06/28/22  0902    138 140   K 5.5* 5.6* 5.4*    108* 108*   CO2 18* 17* 19*   BUN 65* 69* 65*   CREATININE 2.17* 2.35* 2.49*   GLUCOSE 122* 165* 68*   CALCIUM 9.0 8.6 9.2        Phosphorus:  No results for input(s): PHOS in the last 72 hours. Magnesium: No results for input(s): MG in the last 72 hours. Albumin:   Recent Labs     06/27/22  1459   LABALBU 2.6*         SPEP:   Lab Results   Component Value Date    PROT 5.3 06/27/2022    ALBCAL 2.5 06/01/2022    ALBPCT 48 06/01/2022    LABALPH 0.3 06/01/2022    LABALPH 1.2 06/01/2022    A1PCT 6 06/01/2022    A2PCT 23 06/01/2022    LABBETA 0.7 06/01/2022    BETAPCT 13 06/01/2022    GAMGLOB 0.5 06/01/2022    GGPCT 10 06/01/2022    PATH ELECTRONICALLY SIGNED. Taylor Cardona M.D. 06/01/2022    PATH ELECTRONICALLY SIGNED. Taylor Cardona M.D. 06/01/2022     UPEP:   Lab Results   Component Value Date     10/27/2020        C3:   Lab Results   Component Value Date    C3 147 10/27/2020     C4:   Lab Results   Component Value Date    C4 29 10/27/2020     MPO ANCA:    Lab Results   Component Value Date    MPO 5 10/27/2020    .   PR3 ANCA:    Lab Results   Component Value Date    PR3 16 10/27/2020     Urine Sodium:    Lab Results   Component Value Date    SLOAN <20 06/08/2022      Urine Potassium:  No results found for: KUR  Urine Chloride:  No results found for: CLU  Urine Ph:  No components found for: PO4U  Urine Osmolarity:  No results found for: OSMOU  Urine Creatinine:    Lab Results   Component Value Date    LABCREA 63.0 06/08/2022     Urine Eosinophils: No components found for: EOSU  Urine Protein:    Lab Results   Component Value Date     10/27/2020     Urinalysis:  U/A:   Lab Results   Component Value Date    NITRU NEGATIVE 06/27/2022    COLORU Dark Yellow 06/27/2022    PHUR 5.5 06/27/2022    WBCUA 20 TO 50 06/27/2022    RBCUA 20 TO 50 06/27/2022    MUCUS 1+ 06/27/2022    TRICHOMONAS NOT REPORTED 02/07/2022    YEAST NOT REPORTED 02/07/2022    BACTERIA FEW 06/12/2022    SPECGRAV 1.030 06/27/2022    LEUKOCYTESUR SMALL 06/27/2022    UROBILINOGEN Normal 06/27/2022    BILIRUBINUR NEGATIVE 06/27/2022    GLUCOSEU 1+ 06/27/2022    KETUA TRACE 06/27/2022    AMORPHOUS NOT REPORTED 02/07/2022         Radiology:  Reviewed as available. Assessment:  1. Progressive, chronic kidney disease secondary to diabetes mellitus, primarily  2. Hyperkalemia   3. Metabolic acidosis   4. Anemia of chronic disease       Plan: 1. Follow up lab data is ordered     2. Hemodialysis today  3. Evaluate for dialysis in the future  4. After today's treatment, next likely treatment will be Friday, 6/29/2022    Thank you for the consultation. Please do not hesitate to call with questions.     Electronically signed by Hanna Soler MD on 6/28/2022 at 10:31 AM

## 2022-06-28 NOTE — PROGRESS NOTES
[] pending)    VASOPRESSORS:  [] No    [] Yes    If yes -   [] Levophed       [x] Dopamine     [] Vasopressin       [] Dobutamine  [] Phenylephrine         [x] Epinephrine    CENTRAL LINES:     [] No   [x] Yes   (Date of Insertion:   )           If yes -     [] Right IJ     [] Left IJ [] Right Femoral [] Left Femoral                   [] Right Subclavian [] Left Subclavian       GUTIERREZ'S CATHETER:   [] No   [] Yes  (Date of Insertion:   )     URINE OUTPUT:            [] Good   [x] Low              [] Anuric    REVIEW OF SYSTEMS:  Unable to obtain due to patient mentation.      OBJECTIVE:     VITAL SIGNS:  BP (!) 125/48   Pulse 84   Temp 98.6 °F (37 °C) (Bladder)   Resp 24   Wt 151 lb 1.7 oz (68.5 kg)   SpO2 99%   BMI 30.52 kg/m²   Tmax over 24 hours:  Temp (24hrs), Av.5 °F (35.8 °C), Min:95.5 °F (35.3 °C), Max:98.6 °F (37 °C)      Patient Vitals for the past 8 hrs:   BP Temp Temp src Pulse Resp SpO2 Weight   22 0645 (!) 125/48 -- -- 84 24 99 % --   22 0630 (!) 136/36 -- -- 81 26 97 % --   22 0615 121/76 -- -- 82 25 96 % --   22 0600 (!) 138/45 -- -- 82 24 96 % --   22 0545 (!) 138/29 -- -- 80 27 97 % --   22 0530 (!) 124/53 -- -- 81 24 98 % --   22 0515 (!) 139/39 -- -- 82 25 97 % --   22 0500 (!) 133/51 -- -- 80 27 97 % --   22 0445 133/69 -- -- 81 28 95 % --   22 0430 126/80 -- -- 80 26 92 % --   22 0415 (!) 138/45 -- -- 80 (!) 31 97 % --   22 0400 (!) 145/127 98.6 °F (37 °C) Bladder 77 20 94 % --   22 0349 -- -- -- -- 24 -- --   22 0345 103/75 -- -- 77 20 98 % --   22 0330 (!) 91/55 -- -- 77 22 96 % --   22 0315 119/67 -- -- 77 24 92 % --   22 0300 (!) 137/42 -- -- 75 22 98 % --   22 0245 (!) 132/55 -- -- 75 18 96 % --   22 0230 (!) 132/38 -- -- 77 22 92 % --   22 0215 (!) 145/41 -- -- 73 21 95 % -- 06/28/22 7206 -- -- -- -- -- -- 151 lb 1.7 oz (68.5 kg)   06/28/22 0200 (!) 131/37 96.8 °F (36 °C) Temporal 70 20 98 % --   06/28/22 0145 (!) 122/35 -- -- 69 19 100 % --   06/28/22 0130 (!) 93/57 -- -- 88 17 98 % --   06/28/22 0115 (!) 131/45 -- -- 73 23 -- --   06/28/22 0100 -- -- -- 71 21 -- --   06/28/22 0045 -- -- -- 70 19 -- --   06/28/22 0030 (!) 153/91 -- -- 69 22 90 % --         Intake/Output Summary (Last 24 hours) at 6/28/2022 0825  Last data filed at 6/28/2022 7625  Gross per 24 hour   Intake 1443.35 ml   Output 80 ml   Net 1363.35 ml     Date 06/28/22 0000 - 06/28/22 2359   Shift 9978-1129 0959-2560 2275-7091 24 Hour Total   INTAKE   I.V.(mL/kg) 606.2(8.8)   606.2(8.8)   IV Piggyback(mL/kg) 457(6.7)   457(6.7)   Shift Total(mL/kg) 1063. 1(15.5)   1063. 1(15.5)   OUTPUT   Urine(mL/kg/hr) 50(0.1)   50   Shift Total(mL/kg) 50(0.7)   50(0.7)   Weight (kg) 68.5 68.5 68.5 68.5     Wt Readings from Last 3 Encounters:   06/28/22 151 lb 1.7 oz (68.5 kg)   06/22/22 152 lb (68.9 kg)   06/21/22 152 lb (68.9 kg)     Body mass index is 30.52 kg/m². PHYSICAL EXAM:  Constitutional: drowsy, but waking up to strong verbal cues, hard of hearing  HEENT: PERRLA, EOMI, sclera clear, anicteric  Respiratory: decreased breath sounds on left more than right., no wheezes or rales and unlabored breathing. Cardiovascular: regular rate and rhythm, normal S1, S2, no murmur noted and 2+ pulses throughout  Abdomen: soft, nontender, nondistended, no masses or organomegaly  NEUROLOGIC: drowsy, but waking up to strong verbal cues, hard of hearing  Cranial nerves II-XII are grossly intact. Extremities:  peripheral pulses normal, 2+ pedal edema,.       Any additional physical findings:      MEDICATIONS:  Scheduled Meds:   sodium chloride flush  5-40 mL IntraVENous 2 times per day    budesonide-formoterol  2 puff Inhalation BID    aspirin EC  81 mg Oral Daily    atorvastatin  40 mg Oral Daily    magnesium oxide  400 mg Oral BID  miconazole   Topical BID    ferrous sulfate  325 mg Oral Daily with breakfast    albuterol  2.5 mg Nebulization TID     Continuous Infusions:   dextrose      sodium chloride      heparin (PORCINE) Infusion Stopped (06/28/22 0550)    DOPamine 15 mcg/kg/min (06/28/22 5618)    EPINEPHrine 3 mcg/min (06/28/22 0652)     PRN Meds:   glucose, 4 tablet, PRN  dextrose bolus, 125 mL, PRN   Or  dextrose bolus, 250 mL, PRN  glucagon (rDNA), 1 mg, PRN  dextrose, 100 mL/hr, PRN  sodium chloride flush, 5-40 mL, PRN  sodium chloride, , PRN  ondansetron, 4 mg, Q8H PRN   Or  ondansetron, 4 mg, Q6H PRN  polyethylene glycol, 17 g, Daily PRN  acetaminophen, 650 mg, Q6H PRN   Or  acetaminophen, 650 mg, Q6H PRN  heparin (porcine), 4,000 Units, PRN  heparin (porcine), 2,000 Units, PRN  albuterol sulfate HFA, 2 puff, Q6H PRN  morphine, 2 mg, Q4H PRN        SUPPORT DEVICES: [] Ventilator [] BIPAP  [] Nasal Cannula [] Room Air    VENT SETTINGS (Comprehensive) (if applicable):      Additional Respiratory Assessments  Heart Rate: 84  Resp: 24  SpO2: 99 %    ABGs:     Lab Results   Component Value Date    PHART 7.459 03/09/2022    IAR1XGC 39.9 03/09/2022    PO2ART 107.0 03/09/2022    WFN7ODY 28.3 03/09/2022    N4DMECXA 97.4 03/09/2022    FIO2 INFORMATION NOT PROVIDED 06/27/2022     Lactic Acid:   Lab Results   Component Value Date    LACTA 0.7 03/04/2022    LACTA 1.2 02/18/2021    LACTA 1.8 07/25/2020         DATA:  Complete Blood Count:   Recent Labs     06/27/22  1244 06/27/22  1910 06/28/22  0427   WBC 10.4  --  21.4*   HGB 10.5* 11.2* 10.2*   .2*  --  106.4*     --  312   RBC 3.41*  --  3.29*   HCT 35.2* 39.4 35.0*   MCH 30.8  --  31.0   MCHC 29.8  --  29.1   RDW 16.8*  --  16.6*   MPV 10.2  --  10.1        PT/INR:    Lab Results   Component Value Date    PROTIME 10.8 06/14/2022    INR 1.0 06/14/2022     PTT:    Lab Results   Component Value Date    APTT 89.8 06/28/2022       Basal Metabolic Profile:   Recent Labs 06/27/22  1244 06/27/22  1908 06/28/22 0427    136 138   K 5.5* 5.5* 5.6*   BUN 64* 65* 69*   CREATININE 2.13* 2.17* 2.35*   * 107 108*   CO2 21 18* 17*      Magnesium:   Lab Results   Component Value Date    MG 2.3 06/09/2022    MG 2.4 06/07/2022    MG 2.3 05/18/2022     Phosphorus:   Lab Results   Component Value Date    PHOS 4.4 06/15/2022    PHOS 4.6 05/18/2022    PHOS 4.5 04/28/2022     S. Calcium:  Recent Labs     06/28/22 0427   CALCIUM 8.6     S. Ionized Calcium:No results for input(s): IONCA in the last 72 hours. Urinalysis:   Lab Results   Component Value Date    NITRU NEGATIVE 06/27/2022    COLORU Dark Yellow 06/27/2022    PHUR 5.5 06/27/2022    WBCUA 20 TO 50 06/27/2022    RBCUA 20 TO 50 06/27/2022    MUCUS 1+ 06/27/2022    TRICHOMONAS NOT REPORTED 02/07/2022    YEAST NOT REPORTED 02/07/2022    BACTERIA FEW 06/12/2022    SPECGRAV 1.030 06/27/2022    LEUKOCYTESUR SMALL 06/27/2022    UROBILINOGEN Normal 06/27/2022    BILIRUBINUR NEGATIVE 06/27/2022    GLUCOSEU 1+ 06/27/2022    KETUA TRACE 06/27/2022    AMORPHOUS NOT REPORTED 02/07/2022       CARDIAC ENZYMES: No results for input(s): CKMB, CKMBINDEX, TROPONINI in the last 72 hours. Invalid input(s): CKTOTAL;3  BNP: No results for input(s): BNP in the last 72 hours. LFTS  Recent Labs     06/27/22  1459   ALKPHOS 162*   ALT 25   AST 38*   BILITOT <0.10*   BILIDIR <0.08   LABALBU 2.6*       AMYLASE/LIPASE/AMMONIA  No results for input(s): AMYLASE, LIPASE, AMMONIA in the last 72 hours. Last 3 Blood Glucose:   Recent Labs     06/27/22  1244 06/27/22  1908 06/28/22  0427   GLUCOSE 170* 122* 165*      HgBA1c:    Lab Results   Component Value Date    LABA1C 4.6 03/31/2022         TSH:    Lab Results   Component Value Date    TSH 9.54 06/27/2022     ANEMIA STUDIES  No results for input(s): LABIRON, TIBC, FERRITIN, OXYPHKYL34, FOLATE, OCCULTBLD in the last 72 hours.       Cultures during this admission:     Blood cultures: [] None drawn      [x] Negative             []  Positive (Details:  )  Urine Culture:                   [] None drawn      [x] Negative             []  Positive (Details:pend  )  Sputum Culture:               [] None drawn       [] Negative             []  Positive (Details:  )   Endotracheal aspirate:     [] None drawn       [] Negative             []  Positive (Details:  )        ASSESSMENT:     Principal Problem:    Bradycardia  Active Problems:    Lymphedema    Acute kidney injury superimposed on CKD ([address]    Hyperkalemia  Resolved Problems:    * No resolved hospital problems. *        PLAN:        Neurologic:   · Neuro checks per protocol  · Worsening mentatation in last two weeks before coming in per daughter. baseline status otherwise normal.   · CT of head performed in ED. Suggestive of 2.6 cm anterior right temporal meningioma with mild increase in vasogenic edema  · Previous CT's reviewed. · Neurology consulted on admission.      Cardiovascular:        Sinus Bradycardia   ? Cause unknown  ? Unlikely to be secondary to beta blocker overdose  ? Metoprolol, Norvasc, and Amiodarone held  ? Avoid AV Taco blocking agent  ? Continue to monitor on telemetry  ? Cardiology consulted. Recommendations appreciated. ? Started on dopamine gtt , switched to epi drip, plan for pacemaker today?     History of Atrial Fibrillation - CHADS VASC score of 6  · Patient is on Eliquis at home. Started on Heparin gtt. · Metoprolol held due to bradycardia. · Currently in normal sinus rhythm (Sinus bradycardia)  · Mg ordered and pending  · Potassium 5.5. Patient received calcium gluconate as well as insulin/dextrose.   · Repeat BMP ordered and pending.      Chronic Diastolic Heart Failure  · Pro-BNP elevated  · Chest x-ray suggestive of fluid overload   · EF 60-65% on Echo from 3/14/2022  · IV Bumex 2mg yesterday, nephrology consulted for diuretic recs.     Pulmonary:  COPD   · Recently started on home oxygen with number of hypotensive on 12 mcg of dopamine and was restarted on epinephrine drip. Urine output remains low potassium is elevated creatinine is increased to 2.49 potassium was 5.5 and she had received insulin dextrose Lokelma and potassium is 5.4 is still. Chest x-ray shows slightly improvement and she has bilateral pleural effusion but pulm edema/infiltrate slightly better. Her WBC count is increased to 21 she has been seen by wound care and right like she has more areas of his skin breakdown but there is no purulent discharge. And she had received 1 dose of Bumex yesterday and since then she is off Bumex DC Bumex because of pulm edema. She had been on BiPAP overnight and then she was on nasal cannula she had been confused and disoriented and lethargic apparently patient was delirious and agitated and she had received 2 doses of Ativan overnight. Will continue with BiPAP keep head of bed up strict aspiration precaution. Will get blood cultures and urine culture. Start cefepime will get infectious disease evaluation as she will need coverage for MRSA. Will discontinue epinephrine drip and only started on Levophed drip and continue with dopamine drip and wean dopamine drip down to heart rate of 60. Discussed with nephrology they want to start her hemodialysis as she has chronic kidney disease they have discussed with family and they agree. Will follow culture and WBC count and will repeat potassium and BMP. Discussed with ID. Wound care and discussed with wound care and stoma. Discussed with nursing staff, treatment and plan discussed. Discussed with respiratory therapist.    Total critical care time caring for this patient with life threatening, unstable organ failure, including direct patient contact, management of life support systems, review of data including imaging and labs, discussions with other team members and physicians at least 50 min so far today, excluding procedures.        Please note that this chart was generated using voice recognition Dragon dictation software. Although every effort was made to ensure the accuracy of this automated transcription, some errors in transcription may have occurred.      Nikki Santoro MD  6/28/2022 10:21 AM

## 2022-06-29 ENCOUNTER — HOSPITAL ENCOUNTER (OUTPATIENT)
Dept: WOUND CARE | Age: 72
Discharge: HOME | End: 2022-06-29

## 2022-06-29 ENCOUNTER — APPOINTMENT (OUTPATIENT)
Dept: GENERAL RADIOLOGY | Age: 72
DRG: 308 | End: 2022-06-29
Payer: OTHER GOVERNMENT

## 2022-06-29 LAB
ABSOLUTE EOS #: 0.07 K/UL (ref 0–0.44)
ABSOLUTE IMMATURE GRANULOCYTE: 0.07 K/UL (ref 0–0.3)
ABSOLUTE LYMPH #: 0.89 K/UL (ref 1.1–3.7)
ABSOLUTE MONO #: 1.19 K/UL (ref 0.1–1.2)
ALLEN TEST: POSITIVE
ANION GAP SERPL CALCULATED.3IONS-SCNC: 10 MMOL/L (ref 9–17)
BASOPHILS # BLD: 0 % (ref 0–2)
BASOPHILS ABSOLUTE: 0.03 K/UL (ref 0–0.2)
BUN BLDV-MCNC: 46 MG/DL (ref 8–23)
CALCIUM SERPL-MCNC: 7.9 MG/DL (ref 8.6–10.4)
CHLORIDE BLD-SCNC: 101 MMOL/L (ref 98–107)
CO2: 23 MMOL/L (ref 20–31)
CREAT SERPL-MCNC: 1.93 MG/DL (ref 0.5–0.9)
EOSINOPHILS RELATIVE PERCENT: 1 % (ref 1–4)
FIO2: 50
GFR AFRICAN AMERICAN: 31 ML/MIN
GFR NON-AFRICAN AMERICAN: 26 ML/MIN
GFR SERPL CREATININE-BSD FRML MDRD: ABNORMAL ML/MIN/{1.73_M2}
GLUCOSE BLD-MCNC: 100 MG/DL (ref 65–105)
GLUCOSE BLD-MCNC: 121 MG/DL (ref 65–105)
GLUCOSE BLD-MCNC: 68 MG/DL (ref 65–105)
GLUCOSE BLD-MCNC: 70 MG/DL (ref 65–105)
GLUCOSE BLD-MCNC: 81 MG/DL (ref 65–105)
GLUCOSE BLD-MCNC: 83 MG/DL (ref 65–105)
GLUCOSE BLD-MCNC: 88 MG/DL (ref 70–99)
GLUCOSE BLD-MCNC: 96 MG/DL (ref 65–105)
GLUCOSE BLD-MCNC: 96 MG/DL (ref 65–105)
HCT VFR BLD CALC: 26.9 % (ref 36.3–47.1)
HCT VFR BLD CALC: 29.4 % (ref 36.3–47.1)
HEMOGLOBIN: 8.1 G/DL (ref 11.9–15.1)
HEMOGLOBIN: 8.7 G/DL (ref 11.9–15.1)
IMMATURE GRANULOCYTES: 1 %
LYMPHOCYTES # BLD: 7 % (ref 24–43)
MCH RBC QN AUTO: 30.2 PG (ref 25.2–33.5)
MCHC RBC AUTO-ENTMCNC: 29.6 G/DL (ref 28.4–34.8)
MCV RBC AUTO: 102.1 FL (ref 82.6–102.9)
MODE: ABNORMAL
MONOCYTES # BLD: 10 % (ref 3–12)
NRBC AUTOMATED: 0.2 PER 100 WBC
O2 DEVICE/FLOW/%: ABNORMAL
PARTIAL THROMBOPLASTIN TIME: 29.7 SEC (ref 20.5–30.5)
PARTIAL THROMBOPLASTIN TIME: 45.9 SEC (ref 20.5–30.5)
PATIENT TEMP: 34.6
PDW BLD-RTO: 16.3 % (ref 11.8–14.4)
PLATELET # BLD: 211 K/UL (ref 138–453)
PMV BLD AUTO: 9.8 FL (ref 8.1–13.5)
POC HCO3: 27.1 MMOL/L (ref 21–28)
POC O2 SATURATION: 88 % (ref 94–98)
POC PCO2 TEMP: 51 MM HG
POC PCO2: 56.9 MM HG (ref 35–48)
POC PH TEMP: 7.32
POC PH: 7.29 (ref 7.35–7.45)
POC PO2 TEMP: 52 MM HG
POC PO2: 61.5 MM HG (ref 83–108)
POSITIVE BASE EXCESS, ART: 0 (ref 0–3)
POTASSIUM SERPL-SCNC: 4.3 MMOL/L (ref 3.7–5.3)
RBC # BLD: 2.88 M/UL (ref 3.95–5.11)
RBC # BLD: ABNORMAL 10*6/UL
SAMPLE SITE: ABNORMAL
SEG NEUTROPHILS: 82 % (ref 36–65)
SEGMENTED NEUTROPHILS ABSOLUTE COUNT: 9.86 K/UL (ref 1.5–8.1)
SODIUM BLD-SCNC: 134 MMOL/L (ref 135–144)
WBC # BLD: 12.1 K/UL (ref 3.5–11.3)

## 2022-06-29 PROCEDURE — 90935 HEMODIALYSIS ONE EVALUATION: CPT

## 2022-06-29 PROCEDURE — 6360000002 HC RX W HCPCS: Performed by: STUDENT IN AN ORGANIZED HEALTH CARE EDUCATION/TRAINING PROGRAM

## 2022-06-29 PROCEDURE — 2500000003 HC RX 250 WO HCPCS: Performed by: STUDENT IN AN ORGANIZED HEALTH CARE EDUCATION/TRAINING PROGRAM

## 2022-06-29 PROCEDURE — 99233 SBSQ HOSP IP/OBS HIGH 50: CPT | Performed by: INTERNAL MEDICINE

## 2022-06-29 PROCEDURE — 85018 HEMOGLOBIN: CPT

## 2022-06-29 PROCEDURE — 2580000003 HC RX 258: Performed by: STUDENT IN AN ORGANIZED HEALTH CARE EDUCATION/TRAINING PROGRAM

## 2022-06-29 PROCEDURE — 94761 N-INVAS EAR/PLS OXIMETRY MLT: CPT

## 2022-06-29 PROCEDURE — 99291 CRITICAL CARE FIRST HOUR: CPT | Performed by: INTERNAL MEDICINE

## 2022-06-29 PROCEDURE — 94003 VENT MGMT INPAT SUBQ DAY: CPT

## 2022-06-29 PROCEDURE — 82803 BLOOD GASES ANY COMBINATION: CPT

## 2022-06-29 PROCEDURE — 6360000002 HC RX W HCPCS: Performed by: INTERNAL MEDICINE

## 2022-06-29 PROCEDURE — 85025 COMPLETE CBC W/AUTO DIFF WBC: CPT

## 2022-06-29 PROCEDURE — 6370000000 HC RX 637 (ALT 250 FOR IP): Performed by: STUDENT IN AN ORGANIZED HEALTH CARE EDUCATION/TRAINING PROGRAM

## 2022-06-29 PROCEDURE — 71045 X-RAY EXAM CHEST 1 VIEW: CPT

## 2022-06-29 PROCEDURE — 2000000000 HC ICU R&B

## 2022-06-29 PROCEDURE — 80048 BASIC METABOLIC PNL TOTAL CA: CPT

## 2022-06-29 PROCEDURE — 94640 AIRWAY INHALATION TREATMENT: CPT

## 2022-06-29 PROCEDURE — 36415 COLL VENOUS BLD VENIPUNCTURE: CPT

## 2022-06-29 PROCEDURE — 36600 WITHDRAWAL OF ARTERIAL BLOOD: CPT

## 2022-06-29 PROCEDURE — 94681 O2 UPTK CO2 OUTP % O2 XTRC: CPT

## 2022-06-29 PROCEDURE — 85014 HEMATOCRIT: CPT

## 2022-06-29 PROCEDURE — 85730 THROMBOPLASTIN TIME PARTIAL: CPT

## 2022-06-29 PROCEDURE — 99231 SBSQ HOSP IP/OBS SF/LOW 25: CPT | Performed by: PSYCHIATRY & NEUROLOGY

## 2022-06-29 PROCEDURE — 82947 ASSAY GLUCOSE BLOOD QUANT: CPT

## 2022-06-29 PROCEDURE — 2700000000 HC OXYGEN THERAPY PER DAY

## 2022-06-29 RX ORDER — TRANEXAMIC ACID 100 MG/ML
1000 INJECTION, SOLUTION INTRAVENOUS ONCE
Status: COMPLETED | OUTPATIENT
Start: 2022-06-30 | End: 2022-06-30

## 2022-06-29 RX ORDER — DOPAMINE HYDROCHLORIDE 160 MG/100ML
1-20 INJECTION, SOLUTION INTRAVENOUS CONTINUOUS
Status: DISCONTINUED | OUTPATIENT
Start: 2022-06-29 | End: 2022-07-05

## 2022-06-29 RX ORDER — NOREPINEPHRINE BIT/0.9 % NACL 16MG/250ML
1-100 INFUSION BOTTLE (ML) INTRAVENOUS CONTINUOUS
Status: DISCONTINUED | OUTPATIENT
Start: 2022-06-29 | End: 2022-07-06

## 2022-06-29 RX ORDER — LIDOCAINE HYDROCHLORIDE AND EPINEPHRINE 10; 10 MG/ML; UG/ML
20 INJECTION, SOLUTION INFILTRATION; PERINEURAL ONCE
Status: DISCONTINUED | OUTPATIENT
Start: 2022-06-29 | End: 2022-06-29 | Stop reason: RX

## 2022-06-29 RX ADMIN — SODIUM CHLORIDE, PRESERVATIVE FREE 10 ML: 5 INJECTION INTRAVENOUS at 20:10

## 2022-06-29 RX ADMIN — DEXTROSE MONOHYDRATE 125 ML: 100 INJECTION, SOLUTION INTRAVENOUS at 10:11

## 2022-06-29 RX ADMIN — CEFEPIME HYDROCHLORIDE 1000 MG: 1 INJECTION, POWDER, FOR SOLUTION INTRAMUSCULAR; INTRAVENOUS at 10:34

## 2022-06-29 RX ADMIN — ALBUTEROL SULFATE 2.5 MG: 2.5 SOLUTION RESPIRATORY (INHALATION) at 20:18

## 2022-06-29 RX ADMIN — MAGNESIUM GLUCONATE 500 MG ORAL TABLET 400 MG: 500 TABLET ORAL at 20:09

## 2022-06-29 RX ADMIN — ANTI-FUNGAL POWDER MICONAZOLE NITRATE TALC FREE: 1.42 POWDER TOPICAL at 20:09

## 2022-06-29 RX ADMIN — HEPARIN SODIUM 1200 UNITS: 1000 INJECTION INTRAVENOUS; SUBCUTANEOUS at 18:30

## 2022-06-29 RX ADMIN — ATORVASTATIN CALCIUM 40 MG: 80 TABLET, FILM COATED ORAL at 08:15

## 2022-06-29 RX ADMIN — ALBUTEROL SULFATE 2.5 MG: 2.5 SOLUTION RESPIRATORY (INHALATION) at 01:46

## 2022-06-29 RX ADMIN — SODIUM CHLORIDE, PRESERVATIVE FREE 10 ML: 5 INJECTION INTRAVENOUS at 08:15

## 2022-06-29 RX ADMIN — HEPARIN SODIUM 1300 UNITS: 1000 INJECTION INTRAVENOUS; SUBCUTANEOUS at 18:30

## 2022-06-29 RX ADMIN — MAGNESIUM GLUCONATE 500 MG ORAL TABLET 400 MG: 500 TABLET ORAL at 08:15

## 2022-06-29 RX ADMIN — HEPARIN SODIUM 2000 UNITS: 1000 INJECTION INTRAVENOUS; SUBCUTANEOUS at 06:01

## 2022-06-29 RX ADMIN — ANTI-FUNGAL POWDER MICONAZOLE NITRATE TALC FREE: 1.42 POWDER TOPICAL at 08:15

## 2022-06-29 RX ADMIN — ALBUTEROL SULFATE 2.5 MG: 2.5 SOLUTION RESPIRATORY (INHALATION) at 08:29

## 2022-06-29 RX ADMIN — FERROUS SULFATE TAB EC 325 MG (65 MG FE EQUIVALENT) 325 MG: 325 (65 FE) TABLET DELAYED RESPONSE at 08:15

## 2022-06-29 RX ADMIN — Medication 1 MG/HR: at 22:54

## 2022-06-29 RX ADMIN — ALBUTEROL SULFATE 2.5 MG: 2.5 SOLUTION RESPIRATORY (INHALATION) at 14:00

## 2022-06-29 RX ADMIN — Medication 5 MCG/MIN: at 22:50

## 2022-06-29 RX ADMIN — SODIUM CHLORIDE: 9 INJECTION, SOLUTION INTRAVENOUS at 07:54

## 2022-06-29 RX ADMIN — FENTANYL CITRATE 50 MCG: 50 INJECTION, SOLUTION INTRAMUSCULAR; INTRAVENOUS at 08:07

## 2022-06-29 RX ADMIN — DEXTROSE MONOHYDRATE 125 ML: 100 INJECTION, SOLUTION INTRAVENOUS at 15:26

## 2022-06-29 ASSESSMENT — PULMONARY FUNCTION TESTS
PIF_VALUE: 21
PIF_VALUE: 21
PIF_VALUE: 22
PIF_VALUE: 24
PIF_VALUE: 25
PIF_VALUE: 24
PIF_VALUE: 22
PIF_VALUE: 22

## 2022-06-29 NOTE — PROGRESS NOTES
Dialysis Post Treatment Note  Vitals:    06/28/22 2000   BP: (!) 157/44   Pulse: 81   Resp: 23   Temp: (!) 94.1 °F (34.5 °C)   SpO2: 99%     Pre-Weight = 54.7kg  Post-weight = Weight: 119 lb 11.4 oz (54.3 kg)  Total Liters Processed = Total Liters Processed (l/min): 30 l/min  Rinseback Volume (mL) = Rinseback Volume (ml): 260 ml  Net Removal (mL) =  40mL  Patient's dry weight= TBD  Type of access used=  Right temp cath  Length of treatment=120mins    Pt tolerated tx well; no acute distress noted pre, during or post tx; CAR 3 RN Jackie Veliz aware of pt's status during tx including the temp; right temp cath is oozing blood, Jackie Veliz made aware; the resident will address.

## 2022-06-29 NOTE — FLOWSHEET NOTE
SPIRITUAL CARE DEPARTMENT - [facility] [address]  PROGRESS NOTE    Shift date: 6/29/2022  Shift day: Wednesday   Shift # 1    Room # 3020/3020-01   Name: Demetria Ojeda                Muslim: Loida Kern   Place of Buddhism:     Referral: Routine Visit    Admit Date & Time: 6/27/2022 12:28 PM    Assessment:  Leah Leonard is a 66 y.o. female. Upon entering the room writer observes patient intubated and daughter in chair. Daughter, Franklin Potter, welcomed  and engaged well in conversation about her mom's health issues. She stated that her mom never completed advance directives and only discussed her wishes briefly. She said her mom does not want to be intubated long term but does want them to try to save her. Daughter said pt has two more daughters and a son. One daughter wants \"everything to be done\" and the son will not make a decision and will not come to hospital.  Third daughter is a PA, currently not practicing. Franklin Potter understands that the decision to make changes requires a majority of the children. Franklin Potter has done a chaplaincy internship and discussed her beliefs. Intervention:  Writer introduced self and title as  Writer offered space for daughter  to express feelings, needs, and concerns and provided a ministry presence.  discussed patient's health care wishes, explored family dynamics and offered SC to be present to provide support and facilitate a conversation with all children.  offered to set up Zoom meeting so daughter in [place] can see patient. Outcome:  Daughter appeared comforted by prayer and expressed appreciation for support. Plan:  Chaplains will remain available to offer spiritual and emotional support as needed.       Electronically signed by Yelena Pagan, on 6/29/2022 at 1:48 PM.  [facility]  [phone]       06/29/22 5979   Encounter Summary   Service Provided For: Family   Referral/Consult From: Rounding   Last Encounter  06/29/22   Complexity of Encounter Moderate   Begin Time 1320   End Time  1340   Total Time Calculated 20 min   Encounter    Type Initial Screen/Assessment   Spiritual/Emotional needs   Type Spiritual Support   Assessment/Intervention/Outcome   Assessment Calm;Coping   Intervention Active listening;Explored/Affirmed feelings, thoughts, concerns;Prayer (assurance of)/[place]; Other (Comment)  (Explored family dynamics)   Outcome Comfort;Engaged in conversation;Expressed feelings, needs, and concerns;Expressed Gratitude;Receptive

## 2022-06-29 NOTE — PLAN OF CARE
Problem: Respiratory - Adult  Goal: Achieves optimal ventilation and oxygenation  6/29/2022 0910 by Jacek Jones RCP  Outcome: Progressing  6/28/2022 2242 by Cristobal Manning RN  Outcome: Progressing  6/28/2022 2122 by Hafsa Friedman RCP  Outcome: Progressing  Flowsheets  Taken 6/28/2022 2122 by Hafsa Friedman RCP  Achieves optimal ventilation and oxygenation:   Assess for changes in respiratory status   Assess for changes in mentation and behavior   Position to facilitate oxygenation and minimize respiratory effort   Oxygen supplementation based on oxygen saturation or arterial blood gases   Assess the need for suctioning and aspirate as needed   Respiratory therapy support as indicated  Taken 6/28/2022 2000 by Cristobal Manning RN  Achieves optimal ventilation and oxygenation:   Assess for changes in respiratory status   Position to facilitate oxygenation and minimize respiratory effort   Oxygen supplementation based on oxygen saturation or arterial blood gases

## 2022-06-29 NOTE — CARE COORDINATION
Transitional planning. Intubated/ Sedated FiO2 60%, PEEP of 10, HD today, Goal is home with son- current with [facility], has DME. No SNF benefits left. Emailed Nancy partida/ HELP to call /visit pt's daughter to help with Medicaid qualification questions.

## 2022-06-29 NOTE — PROGRESS NOTES
[facility]  Progress Note    Jared Holland  MRN: [id number]  Date: 6/29/2022  LOS: 2     Reason for F/U :   Concern for septic shock    Impression :   1. Severe bradycardia   · requiring dopamine and Levophed for blood pressure support  · Able to wean off the dopamine 6/29/22  2. Altered mental status  3. Acute kidney injury on chronic kidney disease  · Started on hemodialysis 6/28/2022  4. Pulmonary edema  5. Acute respiratory insufficiency-ventilator dependent intubated 6/28/2022  6. Leukocytosis-unclear etiology at this time  7. Multiple/bilateral lower extremity ulcerations-superficial not acutely infected  8. Coccygeal ulceration/right gluteal ulceration-clean      Recommendations:   · The patient continues on empiric antimicrobial therapy with cefepime for possible pneumonia. · The blood culture data thus far remains negative. · Her wounds are clean with no signs of acute infection. · Continue supportive therapy for now    Infection Control Recommendations:   Universal precautions    Discharge Planning:   Estimated Length of IV antimicrobials: To be determined  Patient will need Midline Catheter Insertion/ PICC line Insertion: No  Patient will need: Home IV , [place],  SNF,  LTAC: Undetermined  Patient willneed outpatient wound care: No    Medical Decision making / Summary of Stay:   Jared Holland is a 66y.o.-year-old female who was initially admitted on 6/27/2022.    Morena Gabriel has a history of diabetes mellitus type 2 with associated chronic kidney disease stage IV, hyperlipidemia, coronary artery disease, hypertension, asthma, previous CVA, acute on chronic combined systolic and diastolic heart failure, chronic left lower extremity wound for which she follows in the wound care center, meningioma     The patient was short of breath at home O2 sats in the low 90% on oxygen.     The patient was reported to be more fatigued than normal and the heart rate was found to be 35 and upper extremities) present. Skin:     General: Skin is warm and dry. Comments: The legs and feet are currently in wrapped in Unna boots   Neurological:      Mental Status: She is oriented to person, place, and time. Laboratory data:   I have independently reviewed the followinglabs:  CBC with Differential:   Recent Labs     06/28/22  0427 06/29/22  0335   WBC 21.4* 12.1*   HGB 10.2* 8.7*   HCT 35.0* 29.4*    211   LYMPHOPCT 2* 7*   MONOPCT 10 10     BMP:   Recent Labs     06/28/22  1456 06/29/22  0335    134*   K 5.4* 4.3    101   CO2 21 23   BUN 66* 46*   CREATININE 2.49* 1.93*     Hepatic Function Panel:   Recent Labs     06/27/22  1459   PROT 5.3*   LABALBU 2.6*   BILIDIR <0.08   IBILI Can not be calculated   BILITOT <0.10*   ALKPHOS 162*   ALT 25   AST 38*         Lab Results   Component Value Date    PROCAL 0.11 06/27/2022    PROCAL 0.26 03/07/2022     Lab Results   Component Value Date    CRP <3.0 03/01/2022    CRP <3.0 02/07/2022     Lab Results   Component Value Date    SEDRATE 7 10/17/2017         Lab Results   Component Value Date    DDIMER 0.71 07/25/2020     Lab Results   Component Value Date    FERRITIN 13 03/01/2022    FERRITIN 155 07/25/2020     Lab Results   Component Value Date     03/10/2022     07/25/2020     Lab Results   Component Value Date    FIBRINOGEN 610 07/25/2020       Results in Past 30 Days  Result Component Current Result Ref Range Previous Result Ref Range   SARS-CoV-2, Rapid Not Detected (6/27/2022) Not Detected Not Detected (6/12/2022) Not Detected     Lab Results   Component Value Date    COVID19 Not Detected 06/27/2022    COVID19 Not Detected 06/12/2022    COVID19 Not Detected 03/01/2022    COVID19 Not Detected 02/06/2022    COVID19 Not Detected 07/25/2020       No results for input(s): [facility] in the last 72 hours.     Imaging Studies:   ONE XRAY VIEW OF THE CHEST 6/29/2022 7:19 am  Impression   Scattered infiltrates with would   expect to have a negative (not detected) result in this assay. Negative results should be treated as presumptive and, if inconsistent with clinical signs   and symptoms or necessary for patient management,   should be tested with an alternative molecular assay. Negative results do not preclude   SARS-CoV-2 infection and   should not be used as the sole basis for patient management decisions.         Fact sheet for Healthcare Providers: SeekCultures.si   Fact sheet for Patients: SeekCultures.si           Methodology: Isothermal Nucleic Acid Amplification       Culture, Urine [[id number]]        Medications:      sodium polystyrene  15 g Oral Once    cefepime  1,000 mg IntraVENous Q24H    [Held by provider] amiodarone  100 mg Oral Daily    rocuronium  0.6 mg/kg IntraVENous Once    albuterol  2.5 mg Nebulization Q6H    sodium chloride flush  5-40 mL IntraVENous 2 times per day    budesonide-formoterol  2 puff Inhalation BID    aspirin EC  81 mg Oral Daily    atorvastatin  40 mg Oral Daily    magnesium oxide  400 mg Oral BID    miconazole   Topical BID    ferrous sulfate  325 mg Oral Daily with breakfast           [facility]  Riki Santacruz MD  [employer] messaging  OFFICE: [phone]      Electronically signed by Riki Santacruz MD on 6/29/2022 at 3:18 PM  Thank you for allowing us to participate in the care of this patient. Please call with questions. This note iscreated with the assistance of a speech recognition program.  While intending to generate a document that actually reflects the content of the visit, the document can still have some errors including those of syntax andsound a like substitutions which may escape proof reading. In such instances, actual meaning can be extrapolated by contextual diversion.

## 2022-06-29 NOTE — PROGRESS NOTES
[facility] Neurology   IN-PATIENT SERVICE      NEUROLOGY PROGRESS  NOTE            Date:   6/29/2022  Patient name:  Marian Li  Date of admission:  6/27/2022  YOB: 1950      Interval History:     Since my evaluation yesterday patient has shown improved level of consciousness. Many of her metabolic parameters have been corrected. She continues to need minimal support for bradycardia with dopamine and pressors. She is orally intubated to protect her airway due to altered mental status. She has been able to open her eyes and make request for pain medication. She was not able to localize site of pain. At the time I am seeing her she is relatively sedated. History of Present Illness: The patient is a 66 y.o. female who presents with Bradycardia  . The patient was seen and examined and the chart was reviewed. Patient's daughter is in attendance. Patient had prior evaluations at [facility]. This patient was admitted on 6/27/2022. I performed a neurology consultation on 6/28/2022. Patient has a known meningioma anterior right temporal fossa. It may have increased slightly in comparison to prior study which was done 4 months prior. This does not appear to be a major factor in patient's current symptom complex which is that of a nonspecific encephalopathy in the setting of medical disease which includes progressive renal failure, hypotension and bradycardia.     Past Medical History:     Past Medical History:   Diagnosis Date    Acute CVA (cerebrovascular accident) ([address] 7/25/2020    Acute kidney injury superimposed on chronic kidney disease ([address] 6/8/2022    Acute on chronic combined systolic (congestive) and diastolic (congestive) heart failure ([address] 2/6/2022    JOY (acute kidney injury) ([address] 1/15/2022    Altered mental status 5/26/2021    Anticoagulated 6/8/2022    Arthritis     Asthma     Bradycardia 6/12/2022    Brain mass     Cellulitis and abscess     Cellulitis and abscess of unspecified site     Cellulitis of both lower extremities 5/26/2021    Cellulitis of left lower extremity 7/26/2020    Cellulitis of lower extremity 10/4/2020    CHF (congestive heart failure) (HCC)     Chronic kidney disease, unspecified     CKD stage 4 secondary to hypertension ([facility] 2/20/2020    Coronary atherosclerosis of native coronary artery     Elevated LFTs 2/25/2021    Essential hypertension 7/25/2020    Essential hypertension, malignant     Hallucinations 2/18/2021    Hard of hearing     Head contusion 9/9/2020    Hypertensive emergency 7/25/2020    Hypertensive urgency with nyla 6/9/2022    Hypokalemia 9/9/2020    Hypomagnesemia 5/26/2021    Iron deficiency anemia, unspecified     Meningioma ([facility] 2/25/2021    Myocardial infarction (HCC)     Other and unspecified hyperlipidemia     Pure hypercholesterolemia     Stage 4 chronic kidney disease ([facility]     Toxic metabolic encephalopathy 5/38/6920    Type 2 diabetes mellitus with stage 4 chronic kidney disease, without long-term current use of insulin (HCC)     Type II or unspecified type diabetes mellitus without mention of complication, not stated as uncontrolled     Unspecified asthma(493.90)     Unspecified venous (peripheral) insufficiency     Unspecified vitamin D deficiency     UTI (urinary tract infection) 2/18/2021        Past Surgical History:     Past Surgical History:   Procedure Laterality Date    COLONOSCOPY N/A 3/15/2022    COLONOSCOPY DIAGNOSTIC performed by Nikko Lucero MD at [address] [place] [address]      x 3, Dr. Jo-Ann Avitia  3/7/2022         THORACENTESIS  3/10/2022         TONSILLECTOMY AND ADENOIDECTOMY      UPPER GASTROINTESTINAL ENDOSCOPY N/A 3/15/2022    EGD BIOPSY performed by Nikko Lucero MD at [facility]        Medications during admission:      sodium polystyrene  15 g Oral Once    cefepime  1,000 mg IntraVENous Q24H    [Held by provider] amiodarone  100 mg Oral Daily    rocuronium  0.6 mg/kg IntraVENous Once    albuterol  2.5 mg Nebulization Q6H    sodium chloride flush  5-40 mL IntraVENous 2 times per day    budesonide-formoterol  2 puff Inhalation BID    aspirin EC  81 mg Oral Daily    atorvastatin  40 mg Oral Daily    magnesium oxide  400 mg Oral BID    miconazole   Topical BID    ferrous sulfate  325 mg Oral Daily with breakfast         Physical Exam:   BP (!) 124/37   Pulse 61   Temp (!) 95 °F (35 °C) (Esophageal)   Resp 26   Wt 119 lb 11.4 oz (54.3 kg)   SpO2 100%   BMI 24.18 kg/m²   Temp (24hrs), Av.7 °F (35.4 °C), Min:94.1 °F (34.5 °C), Max:97.1 °F (36.2 °C)      Neurological examination:    Mental status   Patient is sedated at this time. With shaking and loud verbal stimuli she will briefly open her eyes and may make her wishes known with head nod. Cranial nerves   II -pupils are about 4 mm in size. III, IV, VI - gaze is conjugate. V -bilateral mild blinks to corneal present                                             VII - normal facial symmetry                                                             VIII - impaired hearing                                                                          IX, X -not testable                                             XI -not tested                                                     XII -not testable     Motor function   minimal activity without obvious focal finding                     Sensory function  response to tactile stimuli all 4 extremity     Cerebellar  normal tone. No obvious tremor. Reflex function  suppressed reflexes all 4 extremities.   Toes noted   Gait                   nonambulatory         Diagnostics:      Laboratory Testing:  CBC:   Recent Labs     22  1244 22  1244 22  1910 22  0427 22  0335   WBC 10.4  --   --  21.4* 12.1*   HGB 10.5*   < > 11.2* 10.2* 8.7*     --   --  312 211    < > = values in this interval not displayed. BMP:    Recent Labs     06/28/22  0902 06/28/22  1456 06/29/22  0335    137 134*   K 5.4* 5.4* 4.3   * 107 101   CO2 19* 21 23   BUN 65* 66* 46*   CREATININE 2.49* 2.49* 1.93*   GLUCOSE 68* 78 88         Lab Results   Component Value Date    CHOL 135 12/06/2021    LDLCHOLESTEROL 50 12/06/2021    HDL 59 12/06/2021    TRIG 132 12/06/2021    ALT 25 06/27/2022    AST 38 (H) 06/27/2022    TSH 9.54 (H) 06/27/2022    INR 1.0 06/14/2022    LABA1C 4.6 03/31/2022    LABMICR 9,991 (H) 04/08/2022    [id number] 1927 (H) 06/01/2022       No results found for: PHENYTOIN, PHENYTOIN, VALPROATE, CBMZ      Impression:      1. Improving metabolic encephalopathy  2. Right anterior temporal fossa meningioma which may have increased slightly. 3. Bradycardia, renal failure, tendency for hypotension among various medical issues    Plan:     [facility] will continue to follow at this time.  Follow-up MRI may be done electively once patient's medical status is stabilized. [facility] will continue to follow.     Electronically signed by Ayesha Hall MD on 6/29/2022 at 12:18 PM      Ayesha Hall MD  [facility]  Neurology

## 2022-06-29 NOTE — PLAN OF CARE
Problem: Respiratory - Adult  Goal: Achieves optimal ventilation and oxygenation  6/28/2022 2122 by Amparo Walker RCP  Outcome: Progressing  Flowsheets (Taken 6/28/2022 2122)  Achieves optimal ventilation and oxygenation:   Assess for changes in respiratory status   Assess for changes in mentation and behavior   Position to facilitate oxygenation and minimize respiratory effort   Oxygen supplementation based on oxygen saturation or arterial blood gases   Assess the need for suctioning and aspirate as needed   Respiratory therapy support as indicated

## 2022-06-29 NOTE — PLAN OF CARE
Problem: Discharge Planning  Goal: Discharge to home or other facility with appropriate resources  6/29/2022 1223 by Maryam Kilgore RN  Outcome: Progressing  Flowsheets (Taken 6/29/2022 0800)  Discharge to home or other facility with appropriate resources: Identify barriers to discharge with patient and caregiver  6/28/2022 2242 by Rhiannon Del Angel RN  Outcome: Progressing  Flowsheets (Taken 6/28/2022 2000)  Discharge to home or other facility with appropriate resources:   Identify barriers to discharge with patient and caregiver   Arrange for needed discharge resources and transportation as appropriate   Identify discharge learning needs (meds, wound care, etc)     Problem: Chronic Conditions and Co-morbidities  Goal: Patient's chronic conditions and co-morbidity symptoms are monitored and maintained or improved  6/29/2022 1223 by Maryam Kilgore RN  Outcome: Progressing  Flowsheets (Taken 6/29/2022 0800)  Care Plan - Patient's Chronic Conditions and Co-Morbidity Symptoms are Monitored and Maintained or Improved: Monitor and assess patient's chronic conditions and comorbid symptoms for stability, deterioration, or improvement  6/28/2022 2242 by Rhiannon Del Angel RN  Outcome: Progressing     Problem: Pain  Goal: Verbalizes/displays adequate comfort level or baseline comfort level  6/29/2022 1223 by Maryam Kilgore RN  Outcome: Progressing  6/28/2022 2242 by Rhiannon Del Angel RN  Outcome: Progressing  Flowsheets (Taken 6/28/2022 2000)  Verbalizes/displays adequate comfort level or baseline comfort level:   Assess pain using appropriate pain scale   Administer analgesics based on type and severity of pain and evaluate response     Problem: Skin/Tissue Integrity  Goal: Absence of new skin breakdown  Description: 1. Monitor for areas of redness and/or skin breakdown  2. Assess vascular access sites hourly  3. Every 4-6 hours minimum:  Change oxygen saturation probe site  4.   Every 4-6 hours:  If on nasal continuous positive airway pressure, respiratory therapy assess nares and determine need for appliance change or resting period. 6/29/2022 1223 by Xenia Pleitez RN  Outcome: Progressing  6/28/2022 2242 by Laquita Stanton RN  Outcome: Progressing     Problem: Safety - Adult  Goal: Free from fall injury  6/29/2022 1223 by Xenia Pleitez RN  Outcome: Progressing  Flowsheets (Taken 6/29/2022 0800)  Free From Fall Injury: Instruct family/caregiver on patient safety  6/28/2022 2242 by Laquita Stanton RN  Outcome: Progressing  Flowsheets (Taken 6/28/2022 2241)  Free From Fall Injury:   Instruct family/caregiver on patient safety   Based on caregiver fall risk screen, instruct family/caregiver to ask for assistance with transferring infant if caregiver noted to have fall risk factors     Problem: ABCDS Injury Assessment  Goal: Absence of physical injury  6/29/2022 1223 by Xenia Pleitez RN  Outcome: Progressing  Flowsheets (Taken 6/29/2022 0800)  Absence of Physical Injury: Implement safety measures based on patient assessment  6/28/2022 2242 by Laquita Stanton RN  Outcome: Progressing  Flowsheets (Taken 6/28/2022 2241)  Absence of Physical Injury: Implement safety measures based on patient assessment     Problem: Respiratory - Adult  Goal: Achieves optimal ventilation and oxygenation  6/29/2022 1223 by Xenia Pleitez RN  Outcome: Progressing  6/29/2022 0910 by Yuki Lindsey RCP  Outcome: Progressing  6/28/2022 2242 by Laquita Stanton RN  Outcome: Progressing     Problem: Safety - Medical Restraint  Goal: Remains free of injury from restraints (Restraint for Interference with Medical Device)  Description: INTERVENTIONS:  1. Determine that other, less restrictive measures have been tried or would not be effective before applying the restraint  2. Evaluate the patient's condition at the time of restraint application  3. Inform patient/family regarding the reason for restraint  4.  Q2H: Monitor safety, psychosocial status, comfort, nutrition and hydration  6/29/2022 1223 by Jessika Wild RN  Outcome: Progressing  Flowsheets  Taken 6/29/2022 1000  Remains free of injury from restraints (restraint for interference with medical device):   Determine that other, less restrictive measures have been tried or would not be effective before applying the restraint   Inform patient/family regarding the reason for restraint   Every 2 hours: Monitor safety, psychosocial status, comfort, nutrition and hydration  Taken 6/29/2022 0800  Remains free of injury from restraints (restraint for interference with medical device):   Determine that other, less restrictive measures have been tried or would not be effective before applying the restraint   Inform patient/family regarding the reason for restraint   Every 2 hours: Monitor safety, psychosocial status, comfort, nutrition and hydration  6/28/2022 2242 by Danay Barfield RN  Outcome: Progressing  Flowsheets (Taken 6/28/2022 2000)  Remains free of injury from restraints (restraint for interference with medical device):   Determine that other, less restrictive measures have been tried or would not be effective before applying the restraint   Evaluate the patient's condition at the time of restraint application   Inform patient/family regarding the reason for restraint   Every 2 hours: Monitor safety, psychosocial status, comfort, nutrition and hydration

## 2022-06-29 NOTE — PROGRESS NOTES
Comprehensive Nutrition Assessment    Type and Reason for Visit:  Initial,Consult    Nutrition Recommendations/Plan:   Start tube feeding - suggest Renal Formula with goal rate of 35 mL/hr to provide 1512 kcal and 68 g pro/day. Will monitor TF tolerance/adequacy, labs, meds, and care plans. Modify TF as needed. Malnutrition Assessment:  Malnutrition Status:  Insufficient data (06/29/22 1411)    Context:  Acute Illness     Findings of the 6 clinical characteristics of malnutrition:  Energy Intake:  Unable to assess  Weight Loss:  Unable to assess     Body Fat Loss:  No significant body fat loss     Muscle Mass Loss:  No significant muscle mass loss    Fluid Accumulation:  Mild (to moderate) Extremities,Generalized   Strength:  Not Performed    Nutrition Assessment:    Patient admitted with symptomatic bradycardia, and hyperkalemia. PMH includes: CHF, COPD, A.Fib, DM, CVA, CKD. Pt was intubated yesterday afternoon. Dialysis intiated. No nutrition at present, but plan to start TF today. RD consulted for TF ordering/management. Meds/labs reviewed. Nutrition Related Findings:    meds/labs reviewed Wound Type: Pressure Injury,Stage III (right buttocks)       Current Nutrition Intake & Therapies:    Diet NPO    Additional Calorie Sources:  · none      Anthropometric Measures:  Height: 4' 11\" (149.9 cm)  Ideal Body Weight (IBW): 95 lbs (43 kg)       Current Body Weight: 119 lb 11.4 oz (54.3 kg), 126 % IBW. Weight Source: Bed Scale  Current BMI (kg/m2): 24.2                          BMI Categories: Normal Weight (BMI 18.5-24. 9)    Estimated Daily Nutrient Needs:  Energy Requirements Based On: Kcal/kg  Weight Used for Energy Requirements: Current  Energy (kcal/day): 1400 kcal/day  Weight Used for Protein Requirements: Current  Protein (g/day): 65 g pro/day  Method Used for Fluid Requirements: ml/Kg (25)  Fluid (ml/day): 1350 mL/day    Nutrition Diagnosis:   · Inadequate oral intake related to impaired respiratory function as evidenced by NPO or clear liquid status due to medical condition (need for enteral nutrition support)    Nutrition Interventions:   Food and/or Nutrient Delivery: Start Tube Feeding  Nutrition Education/Counseling: No recommendation at this time  Coordination of Nutrition Care: Continue to monitor while inpatient       Goals:     Goals: Meet at least 75% of estimated needs,within 7 days       Nutrition Monitoring and Evaluation:   Behavioral-Environmental Outcomes: None Identified  Food/Nutrient Intake Outcomes: Enteral Nutrition Intake/Tolerance  Physical Signs/Symptoms Outcomes: Biochemical Data,Nutrition Focused Physical Findings,Skin,Weight,Fluid Status or Edema    Discharge Planning:     Too soon to determine     [address], LD  Contact: [phone]

## 2022-06-29 NOTE — PROGRESS NOTES
Dialysis Time Out  To be done by RN and tech or 2 RNs  Staff Names Casa Burr RN / Andrew Jin RN    [x]  Identity of the patient using 2 patient identifiers  [x]  Consent for treatment  [x]  Equipment-proper machine and dialyzer  [x]  B-Hep B status  [x]  Orders- to include bath, blood flow, dialyzer, time and fluid removal  [x]  Access-Correct site and in working order  []  Time for patient to ask questions.     Pt is intubated; unable to ask questions,

## 2022-06-29 NOTE — PROGRESS NOTES
History of Present Illness: This is a 66 y.o. female who I am seeing back today in the ICU because of dialysis dependent acute on chronic kidney injury versus progressive diabetic chronic kidney disease. The patient did require temporary dialysis catheter placement yesterday. She ended up having her initial dialysis treatment last evening. She did have significant bleeding around the dialysis catheter exit site and has required a significant amount of dressing changes because of the bleeding. The patient is intubated and on the ventilator. The patient is sedated. Dialysis #2 is planned for today. The patient now is on vasopressor with Levophed. Her IV fluids are reviewed in depth and in total.  The patient's daughter is at the bedside. I did explain to the daughter the need for continued, ongoing dialysis therapy. There is a significant risk that the patient may need dialysis permanently, although that is not an immediate consideration secondary to the acuity of her illness. The patient initially came to [facility] and presented with worsening shortness of breath and was brought in by EMS. The patient's daughter followed her in her own car to the hospital.  The patient has a known history of type 2 diabetes mellitus for a significant period of time. She has a history of diabetic retinopathy. She has a history of having over 20 g of protein in the urine estimated, indicating nephrotic range proteinuria likely secondary to diabetic nephropathy. She was seen by another Dr. Kenyetta Gonzales recently at [facility] in nephrology consultation. Kassandra Mantilla He suspected she had worsening chronic kidney disease. Lab work today is improved from yesterday after dialysis as expected. Sodium is 134 with potassium 4.3, chloride 101 and CO2 23 with BUN 46 and creatinine 1.93 and calcium 7.9, and glucose 88.      She has had some edema and lower extremity wounds,and it may be related to her proteinuria. She is a full code. There was some mention to her [facility] hospitalization about possible dialysis, but it was never undertaken. I am not able to elucidate at this time the patient's wishes for long-term dialysis, if necessary as she is intubated and sedated and on the ventilator. Renal ultrasound from 6/28/2022 shows right kidney 8.9 cm and left kidney 11.6 cm with increased cortical echogenicity and no hydronephrosis with a 1.4 cm right renal stone and small renal cysts measuring up to 1.3 cm. T   Hepatitis screen on this admission was non-reactive. Serum protein immunofixation was negative for monoclonal immunoglobulin. Past medical history is extensive and is documented below. Medications are reviewed. Allergies are reviewed. Social history is reviewed below. Family history is reviewed below.     Past Medical History:        Diagnosis Date    Acute CVA (cerebrovascular accident) ([address] 7/25/2020    Acute kidney injury superimposed on chronic kidney disease ([address] 6/8/2022    Acute on chronic combined systolic (congestive) and diastolic (congestive) heart failure ([address] 2/6/2022    JOY (acute kidney injury) ([address] 1/15/2022    Altered mental status 5/26/2021    Anticoagulated 6/8/2022    Arthritis     Asthma     Bradycardia 6/12/2022    Brain mass     Cellulitis and abscess     Cellulitis and abscess of unspecified site     Cellulitis of both lower extremities 5/26/2021    Cellulitis of left lower extremity 7/26/2020    Cellulitis of lower extremity 10/4/2020    CHF (congestive heart failure) (HCC)     Chronic kidney disease, unspecified     CKD stage 4 secondary to hypertension ([address] 2/20/2020    Coronary atherosclerosis of native coronary artery     Elevated LFTs 2/25/2021    Essential hypertension 7/25/2020    Essential hypertension, malignant     Hallucinations 2/18/2021    Hard of hearing     Head contusion 9/9/2020    Hypertensive emergency 7/25/2020  Hypertensive urgency with nyla 6/9/2022    Hypokalemia 9/9/2020    Hypomagnesemia 5/26/2021    Iron deficiency anemia, unspecified     Meningioma ([facility] 2/25/2021    Myocardial infarction (HCC)     Other and unspecified hyperlipidemia     Pure hypercholesterolemia     Stage 4 chronic kidney disease (HCC)     Toxic metabolic encephalopathy 5/07/3994    Type 2 diabetes mellitus with stage 4 chronic kidney disease, without long-term current use of insulin (HCC)     Type II or unspecified type diabetes mellitus without mention of complication, not stated as uncontrolled     Unspecified asthma(493.90)     Unspecified venous (peripheral) insufficiency     Unspecified vitamin D deficiency     UTI (urinary tract infection) 2/18/2021       Past Surgical History:        Procedure Laterality Date    COLONOSCOPY N/A 3/15/2022    COLONOSCOPY DIAGNOSTIC performed by Chuckie Pollard MD at [facility].      x 3, Dr. Isaias Caro  3/7/2022         THORACENTESIS  3/10/2022         TONSILLECTOMY AND ADENOIDECTOMY      UPPER GASTROINTESTINAL ENDOSCOPY N/A 3/15/2022    EGD BIOPSY performed by Chuckie Pollard MD at [address] [place] [address]       Current Medications:    sodium polystyrene (KAYEXALATE) 15 GM/60ML suspension 15 g, Once  cefepime (MAXIPIME) 1,000 mg in sterile water 10 mL IV syringe, Q24H  norepinephrine (LEVOPHED) 16 mg in sodium chloride 0.9 % 250 mL infusion, Continuous  [Held by provider] amiodarone (CORDARONE) tablet 100 mg, Daily  rocuronium (ZEMURON) injection 41 mg, Once  midazolam (VERSED) 1 mg/mL in D5W infusion, Continuous  fentaNYL (SUBLIMAZE) injection 50 mcg, Q2H PRN  albuterol (PROVENTIL) nebulizer solution 2.5 mg, Q6H  heparin (porcine) injection 1,300 Units, PRN  heparin (porcine) injection 1,200 Units, PRN  glucose chewable tablet 16 g, PRN  dextrose bolus 10% 125 mL, PRN   Or  dextrose bolus 10% 250 mL, PRN  glucagon (rDNA) injection 1 mg, PRN  dextrose 5 % solution, PRN  sodium chloride flush 0.9 % injection 5-40 mL, 2 times per day  sodium chloride flush 0.9 % injection 5-40 mL, PRN  0.9 % sodium chloride infusion, PRN  ondansetron (ZOFRAN-ODT) disintegrating tablet 4 mg, Q8H PRN   Or  ondansetron (ZOFRAN) injection 4 mg, Q6H PRN  polyethylene glycol (GLYCOLAX) packet 17 g, Daily PRN  acetaminophen (TYLENOL) tablet 650 mg, Q6H PRN   Or  acetaminophen (TYLENOL) suppository 650 mg, Q6H PRN  heparin (porcine) injection 4,000 Units, PRN  heparin (porcine) injection 2,000 Units, PRN  heparin 25,000 units in dextrose 5 % 250 mL infusion (rate based), Continuous  budesonide-formoterol (SYMBICORT) 160-4.5 MCG/ACT inhaler 2 puff, BID  aspirin EC tablet 81 mg, Daily  atorvastatin (LIPITOR) tablet 40 mg, Daily  magnesium oxide (MAG-OX) tablet 400 mg, BID  miconazole (MICOTIN) 2 % powder, BID  albuterol sulfate HFA (PROVENTIL;VENTOLIN;PROAIR) 108 (90 Base) MCG/ACT inhaler 2 puff, Q6H PRN  ferrous sulfate (FE TABS 325) EC tablet 325 mg, Daily with breakfast  DOPamine (INTROPIN) 400 mg in dextrose 5 % 250 mL infusion, Continuous  EPINEPHrine (EPINEPHrine HCL) 5 mg in dextrose 5 % 250 mL infusion, Continuous  morphine (PF) injection 2 mg, Q4H PRN        Allergies:  Latex, Aleve [naproxen sodium], Pioglitazone, Claritin [loratadine], Keflex [cephalexin], and Lisinopril    Social History:   Social History     Socioeconomic History    Marital status:       Spouse name: Not on file    Number of children: 10    Years of education: Not on file    Highest education level: Not on file   Occupational History    Not on file   Tobacco Use    Smoking status: Former Smoker     Packs/day: 0.25     Years: 10.00     Pack years: 2.50     Quit date: 2000     Years since quittin.5    Smokeless tobacco: Never Used   Vaping Use    Vaping Use: Never used   Substance and Sexual Activity    Alcohol use: Not Currently     Alcohol/week: 0.0 standard drinks    Drug use: No    Sexual activity: Yes     Partners: Female   Other Topics Concern    Not on file   Social History Narrative    Not on file     Social Determinants of Health     Financial Resource Strain: Low Risk     Difficulty of Paying Living Expenses: Not very hard   Food Insecurity: No Food Insecurity    Worried About Running Out of Food in the Last Year: Never true    Holden of Food in the Last Year: Never true   Transportation Needs:     Lack of Transportation (Medical): Not on file    Lack of Transportation (Non-Medical): Not on file   Physical Activity:     Days of Exercise per Week: Not on file    Minutes of Exercise per Session: Not on file   Stress:     Feeling of Stress : Not on file   Social Connections:     Frequency of Communication with Friends and Family: Not on file    Frequency of Social Gatherings with Friends and Family: Not on file    Attends Holiness Services: Not on file    Active Member of Clubs or Organizations: Not on file    Attends Club or Organization Meetings: Not on file    Marital Status: Not on file   Intimate Partner Violence:     Fear of Current or Ex-Partner: Not on file    Emotionally Abused: Not on file    Physically Abused: Not on file    Sexually Abused: Not on file   Housing Stability:     Unable to Pay for Housing in the Last Year: Not on file    Number of [place] in the Last Year: Not on file    Unstable Housing in the Last Year: Not on file       Family History:   Family History   Problem Relation Age of Onset    Diabetes Mother     Heart Disease Mother     Heart Disease Father     Diabetes Sister     High Blood Pressure Sister     Diabetes Maternal Grandmother        Review of Systems:    Patient is unable to complete a review of systems.     Objective:  CURRENT TEMPERATURE:  Temp: 97.1 °F (36.2 °C)  MAXIMUM TEMPERATURE OVER 24HRS:  Temp (24hrs), Av °F (35.6 °C), Min:94.1 °F (34.5 °C), Max:98.2 °F (36.8 °C)    CURRENT RESPIRATORY RATE:  Resp: 26  CURRENT PULSE: Heart Rate: 57  CURRENT BLOOD PRESSURE:  BP: (!) 124/58  24HR BLOOD PRESSURE RANGE:  Systolic (12EWF), MGK:567 , Min:74 , APS:797   ; Diastolic (58NCF), PCI:87, Min:35, Max:99    24HR INTAKE/OUTPUT:      Intake/Output Summary (Last 24 hours) at 6/29/2022 0906  Last data filed at 6/29/2022 0835  Gross per 24 hour   Intake 1611.05 ml   Output 480 ml   Net 1131.05 ml       Physical Exam:  General appearance: Sedated and intubated and on the ventilator with FiO2 of 60%, up from 50% last evening  Skin: Distal lower extremity wounds are dressed   Neck: no significant JVD appreciated Pulmonary: bilateral air entry and clear to auscultation bilaterally without wheezes or rales or rhonchi anteriorly and laterally  Cardiovascular: Bradycardia, regular rate and rhythm positive S1 and S2 and no rubs  Abdomen: Soft and non-distended with positive bowel sounds  Extremities: mild lower extremity edema below the knees with wounds       Labs:   CBC:   Recent Labs     06/27/22  1244 06/27/22  1244 06/27/22  1910 06/28/22  0427 06/29/22  0335   WBC 10.4  --   --  21.4* 12.1*   RBC 3.41*  --   --  3.29* 2.88*   HGB 10.5*   < > 11.2* 10.2* 8.7*   HCT 35.2*   < > 39.4 35.0* 29.4*   .2*  --   --  106.4* 102.1   MCH 30.8  --   --  31.0 30.2   MCHC 29.8  --   --  29.1 29.6   RDW 16.8*  --   --  16.6* 16.3*     --   --  312 211   MPV 10.2  --   --  10.1 9.8    < > = values in this interval not displayed. BMP:   Recent Labs     06/28/22  0902 06/28/22  1456 06/29/22  0335    137 134*   K 5.4* 5.4* 4.3   * 107 101   CO2 19* 21 23   BUN 65* 66* 46*   CREATININE 2.49* 2.49* 1.93*   GLUCOSE 68* 78 88   CALCIUM 9.2 8.8 7.9*        Phosphorus:  No results for input(s): PHOS in the last 72 hours. Magnesium: No results for input(s): MG in the last 72 hours.   Albumin:   Recent Labs     06/27/22  1459   LABALBU 2.6*         SPEP:   Lab Results   Component Value Date    PROT 5.3 06/27/2022    ALBCAL 2.5 06/01/2022 ALBPCT 48 06/01/2022    LABALPH 0.3 06/01/2022    LABALPH 1.2 06/01/2022    A1PCT 6 06/01/2022    A2PCT 23 06/01/2022    LABBETA 0.7 06/01/2022    BETAPCT 13 06/01/2022    GAMGLOB 0.5 06/01/2022    GGPCT 10 06/01/2022    PATH ELECTRONICALLY SIGNED. James Juarez M.D. 06/01/2022    PATH ELECTRONICALLY SIGNED. James Juarez M.D. 06/01/2022     UPEP:   Lab Results   Component Value Date     10/27/2020        C3:   Lab Results   Component Value Date    C3 147 10/27/2020     C4:   Lab Results   Component Value Date    C4 29 10/27/2020     MPO ANCA:    Lab Results   Component Value Date    MPO 5 10/27/2020    . PR3 ANCA:    Lab Results   Component Value Date    PR3 16 10/27/2020     Urine Sodium:    Lab Results   Component Value Date    SLOAN <20 06/08/2022      Urine Potassium:  No results found for: KUR  Urine Chloride:  No results found for: CLU  Urine Ph:  No components found for: PO4U  Urine Osmolarity:  No results found for: OSMOU  Urine Creatinine:    Lab Results   Component Value Date    LABCREA 63.0 06/08/2022     Urine Eosinophils: No components found for: EOSU  Urine Protein:    Lab Results   Component Value Date     10/27/2020     Urinalysis:  U/A:   Lab Results   Component Value Date    NITRU NEGATIVE 06/27/2022    COLORU Dark Yellow 06/27/2022    PHUR 5.5 06/27/2022    WBCUA 20 TO 50 06/27/2022    RBCUA 20 TO 50 06/27/2022    MUCUS 1+ 06/27/2022    TRICHOMONAS NOT REPORTED 02/07/2022    YEAST NOT REPORTED 02/07/2022    BACTERIA FEW 06/12/2022    SPECGRAV 1.030 06/27/2022    LEUKOCYTESUR SMALL 06/27/2022    UROBILINOGEN Normal 06/27/2022    BILIRUBINUR NEGATIVE 06/27/2022    GLUCOSEU 1+ 06/27/2022    KETUA TRACE 06/27/2022    AMORPHOUS NOT REPORTED 02/07/2022         Radiology:  Reviewed as available. Assessment:  1. Progressive, chronic kidney disease secondary to diabetes mellitus, primarily, with likely an acute component  2. Hyperkalemia   3. Metabolic acidosis   4.  Anemia of chronic disease   5. Dialysis dependent at this time  6. Nephrotic range proteinuria likely secondary to diabetic nephropathy  7. Vasopressor dependent at this time  8. Acute respiratory failure, ventilator dependent at this time       Plan:  1. Follow up lab data is ordered     2. Hemodialysis today  3. Evaluate daily the need for dialysis  4. The patient has a strong likelihood of requiring dialysis for an extended period and possibly on a permanent basis and so likely will require a tunneled dialysis catheter later in the admission. 5.  Dialysis orders are entered into the patient record today. 6.  Wean vasopressor as tolerated  7. Wean ventilator as tolerated     Please do not hesitate to call with questions.     Electronically signed by Cynthia Pisano MD on 6/29/2022 at 9:06 AM

## 2022-06-29 NOTE — PLAN OF CARE
Problem: Discharge Planning  Goal: Discharge to home or other facility with appropriate resources  Outcome: Progressing  Flowsheets (Taken 6/28/2022 2000)  Discharge to home or other facility with appropriate resources:   Identify barriers to discharge with patient and caregiver   Arrange for needed discharge resources and transportation as appropriate   Identify discharge learning needs (meds, wound care, etc)     Problem: Chronic Conditions and Co-morbidities  Goal: Patient's chronic conditions and co-morbidity symptoms are monitored and maintained or improved  Outcome: Progressing     Problem: Pain  Goal: Verbalizes/displays adequate comfort level or baseline comfort level  Outcome: Progressing  Flowsheets (Taken 6/28/2022 2000)  Verbalizes/displays adequate comfort level or baseline comfort level:   Assess pain using appropriate pain scale   Administer analgesics based on type and severity of pain and evaluate response     Problem: Skin/Tissue Integrity  Goal: Absence of new skin breakdown  Description: 1. Monitor for areas of redness and/or skin breakdown  2. Assess vascular access sites hourly  3. Every 4-6 hours minimum:  Change oxygen saturation probe site  4. Every 4-6 hours:  If on nasal continuous positive airway pressure, respiratory therapy assess nares and determine need for appliance change or resting period.   Outcome: Progressing     Problem: Safety - Adult  Goal: Free from fall injury  Outcome: Progressing  Flowsheets (Taken 6/28/2022 2241)  Free From Fall Injury:   Instruct family/caregiver on patient safety   Based on caregiver fall risk screen, instruct family/caregiver to ask for assistance with transferring infant if caregiver noted to have fall risk factors     Problem: ABCDS Injury Assessment  Goal: Absence of physical injury  Outcome: Progressing  Flowsheets (Taken 6/28/2022 2241)  Absence of Physical Injury: Implement safety measures based on patient assessment     Problem: Respiratory - Adult  Goal: Achieves optimal ventilation and oxygenation  6/28/2022 2242 by Manan Clayton RN  Outcome: Progressing  6/28/2022 2122 by Jesus Weaver RCP  Outcome: Progressing  Flowsheets  Taken 6/28/2022 2122 by Jesus Weaver RCP  Achieves optimal ventilation and oxygenation:   Assess for changes in respiratory status   Assess for changes in mentation and behavior   Position to facilitate oxygenation and minimize respiratory effort   Oxygen supplementation based on oxygen saturation or arterial blood gases   Assess the need for suctioning and aspirate as needed   Respiratory therapy support as indicated  Taken 6/28/2022 2000 by Manan Clayton RN  Achieves optimal ventilation and oxygenation:   Assess for changes in respiratory status   Position to facilitate oxygenation and minimize respiratory effort   Oxygen supplementation based on oxygen saturation or arterial blood gases  6/28/2022 1538 by Che Kothari RCP  Outcome: Progressing     Problem: Safety - Medical Restraint  Goal: Remains free of injury from restraints (Restraint for Interference with Medical Device)  Description: INTERVENTIONS:  1. Determine that other, less restrictive measures have been tried or would not be effective before applying the restraint  2. Evaluate the patient's condition at the time of restraint application  3. Inform patient/family regarding the reason for restraint  4.  Q2H: Monitor safety, psychosocial status, comfort, nutrition and hydration  Outcome: Progressing  Flowsheets (Taken 6/28/2022 2000)  Remains free of injury from restraints (restraint for interference with medical device):   Determine that other, less restrictive measures have been tried or would not be effective before applying the restraint   Evaluate the patient's condition at the time of restraint application   Inform patient/family regarding the reason for restraint   Every 2 hours: Monitor safety, psychosocial status, comfort, nutrition and hydration

## 2022-06-29 NOTE — PROGRESS NOTES
Dialysis Time Out  To be done by RN and tech or 2 RNs  Staff Names Gerber Thapa RN and Justine RN. [x]  Identity of the patient using 2 patient identifiers  [x]  Consent for treatment  [x]  Equipment-proper machine and dialyzer  [x]  B-Hep B status  [x]  Orders- to include bath, blood flow, dialyzer, time and fluid removal  [x]  Access-Correct site and in working order  []  Time for patient to ask questions. *Pt. Is intubated connected to [facility] ventilator; unable to ask questions.

## 2022-06-29 NOTE — PROGRESS NOTES
Dialysis Post Treatment Note  Vitals:    06/29/22 1845   BP: (!) 132/42   Pulse: 75   Resp: 17   Temp: (!) 95.9 °F (35.5 °C)   SpO2: 100%     Pre-Weight = 54.3kg  Post-weight = Weight: 118 lb 2.7 oz (53.6 kg)  Total Liters Processed = Total Liters Processed (l/min): 49.8 l/min  Rinseback Volume (mL) = Rinseback Volume (ml): 300 ml  Net Removal (mL) =  1200ml  Patient's dry weight=not established  Type of access used=CVC right  Length of treatment=180min    Pt. Tolerated HD tx. Able to remove 1.2L of fluid. No acute distress pre, during, or post hd tx. With ongoing right temp cath, with oozing of blood. Floor nurse Lorn and residents aware. Catheter clamped, capped, and heparinized as ordered. Report given to floor nurse Justine.

## 2022-06-29 NOTE — ACP (ADVANCE CARE PLANNING)
Advance Care Planning     Advance Care Planning Activator (Inpatient)  Conversation Note      Date of ACP Conversation: 6/29/2022     Conversation Conducted with:  Carolyn Dunaway    ACP Activator: Angel Lloyd RN      Health Care Decision Maker:     Current Designated Health Care Decision Maker:     Primary Decision Maker: Sylwia Yuen Child - [phone]    Primary Decision Maker: CYRIL NELSON - Child - [phone]    Primary Decision Maker: Sofia Manrique Child - [phone]    Primary Decision Maker: Leroy Broseley - [phone]  Click here to complete Healthcare Decision Makers including section of the Healthcare Decision Maker Relationship (ie \"Primary\")  Today we documented Decision Maker(s) consistent with ACP documents on file. Care Preferences    Ventilation: \"If you were in your present state of health and suddenly became very ill and were unable to breathe on your own, what would your preference be about the use of a ventilator (breathing machine) if it were available to you? \"      Would the patient desire the use of ventilator (breathing machine)?: yes    \"If your health worsens and it becomes clear that your chance of recovery is unlikely, what would your preference be about the use of a ventilator (breathing machine) if it were available to you? \"     Would the patient desire the use of ventilator (breathing machine)?: Yes      Resuscitation  \"CPR works best to restart the heart when there is a sudden event, like a heart attack, in someone who is otherwise healthy. Unfortunately, CPR does not typically restart the heart for people who have serious health conditions or who are very sick. \"    \"In the event your heart stopped as a result of an underlying serious health condition, would you want attempts to be made to restart your heart (answer \"yes\" for attempt to resuscitate) or would you prefer a natural death (answer \"no\" for do not attempt to resuscitate)? \" yes       [] Yes   [] No Educated Patient / Ysabel Swartz regarding differences between Advance Directives and portable DNR orders.     Length of ACP Conversation in minutes:      Conversation Outcomes:  [x] ACP discussion completed  [] Existing advance directive reviewed with patient; no changes to patient's previously recorded wishes  [] New Advance Directive completed  [] Portable Do Not Rescitate prepared for Provider review and signature  [] POLST/POST/MOLST/MOST prepared for Provider review and signature      Follow-up plan:    [] Schedule follow-up conversation to continue planning  [] Referred individual to Provider for additional questions/concerns   [x] Advised patient/agent/surrogate to review completed ACP document and update if needed with changes in condition, patient preferences or care setting    [] This note routed to one or more involved healthcare providers

## 2022-06-29 NOTE — PROGRESS NOTES
Critical Care Team - Daily Progress Note      Date and time: 6/29/2022 9:17 AM  Patient's name:  Marian Li  Medical Record Number: [id number]  Patient's account/billing number: [de-identified]  Patient's YOB: 1950  Age: 66 y.o. Date of Admission: 6/27/2022 12:28 PM  Length of stay during current admission: 2      Primary Care Physician: Re Brennan MD  ICU Attending Physician: Dr. Carrillo Linker Status: Full Code    Reason for ICU admission:   Chief Complaint   Patient presents with    Bradycardia       Subjective:     OVERNIGHT EVENTS:       Geddes Clore. Patient has persistent bleeding from RIJ dialysis catheter. Still on heparin per cards recs. Likely caridorenal syndrome, bradycardia and hx of HF-->renal hypoperfusion. Hyperkalemia and renal failure--> dialysis per nephro, will follow up recs today as pt may need additional dialysis. Cr. Improving. K normalized at 4.3    Otherwise pt remains intubated and HDS  On 1mg versed/hr and 8mcg/min Levo. AWAKE & FOLLOWING COMMANDS:  [x] No received ativan, will reassess.   [] Yes    CURRENT VENTILATION STATUS:     [x] Ventilator  [] BIPAP  [] Nasal Cannula [] Room Air          SECRETIONS Amount:  [] Small [] Moderate  [] Large  [] None  Color:     [] White [] Colored  [] Bloody    SEDATION:  RAAS Score:  [] Propofol gtt  [] Versed gtt  [] Ativan gtt   [x] No Sedation    PARALYZED:  [x] No    [] Yes    DIARRHEA:                [x] No                [] Yes  (C. Difficile status: [] positive                                                                                                                       [] negative                                                                                                                     [] pending)    VASOPRESSORS:  [] No    [] Yes    If yes -   [] Levophed       [x] Dopamine     [] Vasopressin       [] Dobutamine  [] Phenylephrine         [x] Epinephrine    CENTRAL LINES:     [] No   [x] Yes   (Date of Insertion:   )           If yes -     [] Right IJ     [] Left IJ [] Right Femoral [] Left Femoral                   [] Right Subclavian [] Left Subclavian       GUTIERREZ'S CATHETER:   [] No   [] Yes  (Date of Insertion:   )     URINE OUTPUT:            [] Good   [x] Low              [] Anuric    REVIEW OF SYSTEMS:  Unable to obtain due to patient mentation.      OBJECTIVE:     VITAL SIGNS:  BP (!) 124/58   Pulse 57   Temp 97.1 °F (36.2 °C) (Temporal)   Resp 26   Wt 119 lb 11.4 oz (54.3 kg)   SpO2 100%   BMI 24.18 kg/m²   Tmax over 24 hours:  Temp (24hrs), Av °F (35.6 °C), Min:94.1 °F (34.5 °C), Max:98.2 °F (36.8 °C)      Patient Vitals for the past 8 hrs:   BP Temp Temp src Pulse Resp SpO2   22 0836 -- -- -- 57 26 100 %   22 0645 (!) 124/58 -- -- 61 26 100 %   22 0630 (!) 127/40 -- -- 61 25 100 %   22 0615 (!) 128/45 -- -- 62 25 100 %   22 0609 -- -- -- 63 26 100 %   22 0600 (!) 124/41 97.1 °F (36.2 °C) Temporal 63 23 100 %   22 0545 (!) 118/43 -- -- 60 26 100 %   22 0530 (!) 119/37 -- -- 60 26 100 %   22 0515 (!) 113/38 -- -- 61 26 100 %   22 0500 (!) 123/47 -- -- 62 (!) 7 97 %   22 0449 -- -- -- 62 26 100 %   22 0445 (!) 127/54 -- -- 62 19 100 %   22 0430 (!) 128/41 -- -- 62 24 100 %   22 0415 (!) 116/37 -- -- 61 24 100 %   22 0400 (!) 115/38 96.9 °F (36.1 °C) Temporal 62 25 100 %   22 0345 (!) 118/37 -- -- 62 24 100 %   22 0330 (!) 126/37 -- -- 63 24 100 %   22 0320 -- -- -- 63 25 100 %   22 0315 (!) 122/39 -- -- 62 25 100 %   22 0300 (!) 119/41 -- -- 62 23 100 %   22 0245 (!) 120/38 -- -- 62 24 100 %   22 0230 (!) 117/38 -- -- 62 24 100 %   22 0215 (!) 115/36 -- -- 61 24 100 %   22 0200 (!) 119/39 (!) 96.2 °F (35.7 °C) Temporal 59 22 100 %   22 0145 (!) 114/38 -- -- 59 24 100 %   22 0130 (!) 105/38 -- -- 59 24 100 %         Intake/Output Summary (Last 24 hours) at 6/29/2022 0917  Last data filed at 6/29/2022 0829  Gross per 24 hour   Intake 1611.05 ml   Output 480 ml   Net 1131.05 ml     Date 06/29/22 0000 - 06/29/22 2359   Shift 2306-4505 [id number] 7039-7380 24 Hour Total   INTAKE   I.V.(mL/kg) 139.7(2.6) 15.6(0.3)  155.3(2.9)   Shift Total(mL/kg) 139.7(2.6) 15.6(0.3)  155.3(2.9)   OUTPUT   Urine(mL/kg/hr) 65(0.1)   65   Shift Total(mL/kg) 65(1.2)   65(1.2)   Weight (kg) 54.3 54.3 54.3 54.3     Wt Readings from Last 3 Encounters:   06/28/22 119 lb 11.4 oz (54.3 kg)   06/22/22 152 lb (68.9 kg)   06/21/22 152 lb (68.9 kg)     Body mass index is 24.18 kg/m². PHYSICAL EXAM:  Constitutional: drowsy, but waking up to strong verbal cues, hard of hearing  HEENT: PERRLA, EOMI, sclera clear, anicteric  Respiratory: decreased breath sounds on left more than right., no wheezes or rales and unlabored breathing. Cardiovascular: regular rate and rhythm, normal S1, S2, no murmur noted and 2+ pulses throughout  Abdomen: soft, nontender, nondistended, no masses or organomegaly  NEUROLOGIC: drowsy, but waking up to strong verbal cues, hard of hearing  Cranial nerves II-XII are grossly intact. Extremities:  peripheral pulses normal, 2+ pedal edema,.       Any additional physical findings:      MEDICATIONS:  Scheduled Meds:   sodium polystyrene  15 g Oral Once    cefepime  1,000 mg IntraVENous Q24H    [Held by provider] amiodarone  100 mg Oral Daily    rocuronium  0.6 mg/kg IntraVENous Once    albuterol  2.5 mg Nebulization Q6H    sodium chloride flush  5-40 mL IntraVENous 2 times per day    budesonide-formoterol  2 puff Inhalation BID    aspirin EC  81 mg Oral Daily    atorvastatin  40 mg Oral Daily    magnesium oxide  400 mg Oral BID    miconazole   Topical BID    ferrous sulfate  325 mg Oral Daily with breakfast     Continuous Infusions:   norepinephrine 8 mcg/min (06/29/22 0829)    midazolam 1 mg/hr (06/29/22 0829)    dextrose      sodium chloride 10 mL/hr at 06/29/22 0829    heparin (PORCINE) Infusion 11 Units/kg/hr (06/29/22 0829)    DOPamine Stopped (06/29/22 0201)    EPINEPHrine Stopped (06/28/22 0934)     PRN Meds:   fentanNYL, 50 mcg, Q2H PRN  heparin (porcine), 1,300 Units, PRN  heparin (porcine), 1,200 Units, PRN  glucose, 4 tablet, PRN  dextrose bolus, 125 mL, PRN   Or  dextrose bolus, 250 mL, PRN  glucagon (rDNA), 1 mg, PRN  dextrose, 100 mL/hr, PRN  sodium chloride flush, 5-40 mL, PRN  sodium chloride, , PRN  ondansetron, 4 mg, Q8H PRN   Or  ondansetron, 4 mg, Q6H PRN  polyethylene glycol, 17 g, Daily PRN  acetaminophen, 650 mg, Q6H PRN   Or  acetaminophen, 650 mg, Q6H PRN  heparin (porcine), 4,000 Units, PRN  heparin (porcine), 2,000 Units, PRN  albuterol sulfate HFA, 2 puff, Q6H PRN  morphine, 2 mg, Q4H PRN        SUPPORT DEVICES: [] Ventilator [] BIPAP  [] Nasal Cannula [] Room Air    VENT SETTINGS (Comprehensive) (if applicable):  Vent Information  Ventilator ID: [id number]  Vent Mode: PRVC  Additional Respiratory Assessments  Heart Rate: 57  Resp: 26  SpO2: 100 %  End Tidal CO2: 37 (%)  Position: Semi-Leung's  Humidification Source: [facility]    ABGs:     Lab Results   Component Value Date    PHART 7.459 03/09/2022    JTT6NRL 39.9 03/09/2022    PO2ART 107.0 03/09/2022    AFZ8ZLT 28.3 03/09/2022    V8AXJPZA 97.4 03/09/2022    FIO2 50.0 06/29/2022     Lactic Acid:   Lab Results   Component Value Date    LACTA 0.7 03/04/2022    LACTA 1.2 02/18/2021    LACTA 1.8 07/25/2020         DATA:  Complete Blood Count:   Recent Labs     06/27/22  1244 06/27/22  1244 06/27/22  1910 06/28/22  0427 06/29/22  0335   WBC 10.4  --   --  21.4* 12.1*   HGB 10.5*   < > 11.2* 10.2* 8.7*   .2*  --   --  106.4* 102.1     --   --  312 211   RBC 3.41*  --   --  3.29* 2.88*   HCT 35.2*   < > 39.4 35.0* 29.4*   MCH 30.8  --   --  31.0 30.2   MCHC 29.8  --   --  29.1 29.6   RDW 16.8*  --   --  16.6* 16.3*   MPV 10.2  --   --  10.1 9.8    < > = values in this interval not displayed. PT/INR:    Lab Results   Component Value Date    PROTIME 10.8 06/14/2022    INR 1.0 06/14/2022     PTT:    Lab Results   Component Value Date    APTT 45.9 06/29/2022       Basal Metabolic Profile:   Recent Labs     06/28/22  0902 06/28/22  1456 06/29/22  0335    137 134*   K 5.4* 5.4* 4.3   BUN 65* 66* 46*   CREATININE 2.49* 2.49* 1.93*   * 107 101   CO2 19* 21 23      Magnesium:   Lab Results   Component Value Date    MG 2.3 06/09/2022    MG 2.4 06/07/2022    MG 2.3 05/18/2022     Phosphorus:   Lab Results   Component Value Date    PHOS 4.4 06/15/2022    PHOS 4.6 05/18/2022    PHOS 4.5 04/28/2022     S. Calcium:  Recent Labs     06/29/22  0335   CALCIUM 7.9*     S. Ionized Calcium:No results for input(s): IONCA in the last 72 hours. Urinalysis:   Lab Results   Component Value Date    NITRU NEGATIVE 06/27/2022    COLORU Dark Yellow 06/27/2022    PHUR 5.5 06/27/2022    WBCUA 20 TO 50 06/27/2022    RBCUA 20 TO 50 06/27/2022    MUCUS 1+ 06/27/2022    TRICHOMONAS NOT REPORTED 02/07/2022    YEAST NOT REPORTED 02/07/2022    BACTERIA FEW 06/12/2022    SPECGRAV 1.030 06/27/2022    LEUKOCYTESUR SMALL 06/27/2022    UROBILINOGEN Normal 06/27/2022    BILIRUBINUR NEGATIVE 06/27/2022    GLUCOSEU 1+ 06/27/2022    KETUA TRACE 06/27/2022    AMORPHOUS NOT REPORTED 02/07/2022       CARDIAC ENZYMES: No results for input(s): CKMB, CKMBINDEX, TROPONINI in the last 72 hours. Invalid input(s): CKTOTAL;3  BNP: No results for input(s): BNP in the last 72 hours. LFTS  Recent Labs     06/27/22  1459   ALKPHOS 162*   ALT 25   AST 38*   BILITOT <0.10*   BILIDIR <0.08   LABALBU 2.6*       AMYLASE/LIPASE/AMMONIA  No results for input(s): AMYLASE, LIPASE, AMMONIA in the last 72 hours.     Last 3 Blood Glucose:   Recent Labs     06/27/22  1244 06/27/22  1908 06/28/22  0427 06/28/22  0902 06/28/22  1456 06/29/22  0335   GLUCOSE 170* 122* 165* 68* 78 88      HgBA1c:    Lab Results   Component Value Date    LABA1C 4.6 03/31/2022         TSH:    Lab Results   Component Value Date    TSH 9.54 06/27/2022     ANEMIA STUDIES  No results for input(s): LABIRON, TIBC, FERRITIN, IWNJRHHE04, FOLATE, OCCULTBLD in the last 72 hours. Cultures during this admission:     Blood cultures:                 [] None drawn      [x] Negative             []  Positive (Details:  )  Urine Culture:                   [] None drawn      [x] Negative             []  Positive (Details:pend  )  Sputum Culture:               [] None drawn       [] Negative             []  Positive (Details:  )   Endotracheal aspirate:     [] None drawn       [] Negative             []  Positive (Details:  )        ASSESSMENT:     Principal Problem:    Bradycardia  Active Problems:    Lymphedema    Acute kidney injury superimposed on CKD (HCC)    Hyperkalemia    Shock ([address]    Leukocytosis    Acute respiratory failure with hypoxia and hypercapnia (HCC)    Pulmonary edema cardiac cause (formerly [facility])    Acute on chronic diastolic congestive heart failure ([address]  Resolved Problems:    * No resolved hospital problems. *        PLAN:        Neurologic:   · Neuro checks per protocol  · Worsening mentatation in last two weeks before coming in per daughter. baseline status otherwise normal.   · CT of head performed in ED. Suggestive of 2.6 cm anterior right temporal meningioma with mild increase in vasogenic edema  · Previous CT's reviewed. Pending additional neurology recommendations, MRI was considered however noncritical at this time. Cardiovascular:        Sinus Bradycardia   ? Possibly secondary to hyperkalemia however unlikely as potassium was only 5.5. Electrolytes were otherwise unremarkable. Nephrology was consulted. See renal below. ? Cardiology consulted for evaluation of bradycardia. Recommended continuing holding Eliquis and continuing heparin. Continue aspirin and statin. Cardiology signed off. ? Levophed for pressure support today.   Discontinue dopamine.     History of Atrial Fibrillation - CHADS VASC score of 6  · Patient currently on Eliquis, hemodynamically stable, not in RVR.     Chronic Diastolic Heart Failure  · Recent echo showed EF of 60 to 65%.       Pulmonary:  COPD   Patient was recently started on home oxygen 2 to 3 L. Breathing treatments as needed. Possible concern community versus hospital-acquired pneumonia, appropriate antibiotics. Scattered infiltrates with bilateral effusions sent on chest x-ray completed this morning. GI/Nutrition  · Glycolax PRN  · Ulcer Prophylaxis: Not indicated  · Diet:Diet NPO      Renal/Fluid/Electrolyte  JOY on CKD Stage 4  · Creatinine 2.13 with baseline around 1.7  · Most likely pre-renal secondary to decreased perfusion due to pulmonary vascular congestion. · Continue diuresis as needed, nephrology considering dialysis, we will follow-up additional recommendations. Creatinine improved to 1.93 this morning.     ID  · Scattered infiltrates with bilateral pleural effusions present on chest x-ray this morning. Patient upon appropriate antibiotics, cefepime and Levaquin for hospital versus community-acquired pneumonia. We will continue to trend white count, manage fevers at present. White blood cell count 12.1 down from 21.4 yesterday.     Hematology:      · Recent Labs     06/27/22  1244   HGB 10.5*   · Stable. Daily CBC.      Endocrine:   ? Hx of diabetes mellitus not on any home medications  ? Most recent HbA1c 4.6 from 3/31/2022  ? Will continue to monitor  ? Patient is currently NPO      DVT Prophylaxis  ? Heparin gtt         Yo Hall DO,        Department of Internal Medicine/ Critical care  [facility])             6/29/2022, 9:17 AM      Attending Physician Statement  I have discussed the care of [address] [place] [address] Drive, including pertinent history and exam findings with the resident.  I have reviewed the key elements of all parts of the encounter with the resident. I have seen and examined the patient with the resident. I agree with the assessment and plan and status of the problem list as documented.     I have seen the patient during around today, chart reviewed, labs and medications reviewed overnight events noted  Overnight events noted. Patient had hemodialysis catheter placed and she had hemodialysis last night. She is on low-dose of Versed drip 1 mg/h she is lethargic and somnolent when I saw her did not follow commands. She is on low-dose of Levophed 6 mcg and she is off dopamine. Not on bicarbonate drip. Chest x-ray shows bilateral pleural effusion right greater than the left. Labs shows creatinine of 1.93 potassium 4.3 WBC count is 12 down from 21 hemoglobin is 8.7 is stable but she is having bleeding at the site of the Yohan catheter in right IJ slight oozing from right femoral site no active bleeding no hematoma. Patient is currently on heparin drip. Ventilator setting PRVC 26/350/10/60%, ABG 7.28/56/61/27  Hemodialysis today. Will hold heparin drip at this time as patient is having bleeding and there is a drop in hemoglobin. She has been consistently low blood sugar between 70s and 80s we will start her on tube feeding. Wound care to follow. Currently she is on cefepime seen by infectious disease blood culture from 06/27/2022 is negative so far.     Discussed with daughter in detail and updated  Discussed with ICU nursing staff, treatment and plan discussed.       Total critical care time caring for this patient with life threatening, unstable organ failure, including direct patient contact, management of life support systems, review of data including imaging and labs, discussions with other team members and physicians at least 36 Min so far today, excluding procedures.        Please note that this chart was generated using voice recognition Dragon dictation software.  Although every effort was made to ensure the accuracy of this automated transcription, some errors in transcription may have occurred.   Jerica Finch MD  6/29/2022 11:26 AM

## 2022-06-29 NOTE — PROGRESS NOTES
[facility]   Progress Note                    Date:   6/29/2022  Patient name:  Taniya Whitlock  Date of admission:  6/27/2022 12:28 PM  MRN:   [id number]  YOB: 1950  PCP:    Dior Huertas MD    Reason for Admission:  Bradycardia [R00.1]    Subjective:      Clinical Changes / Abnormalities:    Patient seen and examined at bedside. No acute events overnight. Dopamine gtt and epinephrine gtt weaned off. K 4.3 this morning. HR in 70's. Urine output in the last 24 hours:     Intake/Output Summary (Last 24 hours) at 6/29/2022 0915  Last data filed at 6/29/2022 0829  Gross per 24 hour   Intake 1611.05 ml   Output 480 ml   Net 1131.05 ml       Medications:   Scheduled Meds:   sodium polystyrene  15 g Oral Once    cefepime  1,000 mg IntraVENous Q24H    [Held by provider] amiodarone  100 mg Oral Daily    rocuronium  0.6 mg/kg IntraVENous Once    albuterol  2.5 mg Nebulization Q6H    sodium chloride flush  5-40 mL IntraVENous 2 times per day    budesonide-formoterol  2 puff Inhalation BID    aspirin EC  81 mg Oral Daily    atorvastatin  40 mg Oral Daily    magnesium oxide  400 mg Oral BID    miconazole   Topical BID    ferrous sulfate  325 mg Oral Daily with breakfast     Continuous Infusions:   norepinephrine 8 mcg/min (06/29/22 0829)    midazolam 1 mg/hr (06/29/22 0829)    dextrose      sodium chloride 10 mL/hr at 06/29/22 0829    heparin (PORCINE) Infusion 11 Units/kg/hr (06/29/22 0829)    DOPamine Stopped (06/29/22 0201)    EPINEPHrine Stopped (06/28/22 0934)     CBC:   Recent Labs     06/27/22  1244 06/27/22  1244 06/27/22  1910 06/28/22  0427 06/29/22  0335   WBC 10.4  --   --  21.4* 12.1*   HGB 10.5*   < > 11.2* 10.2* 8.7*     --   --  312 211    < > = values in this interval not displayed.      BMP:    Recent Labs     06/28/22  0902 06/28/22  1456 06/29/22  0335    137 134*   K 5.4* 5.4* 4.3   * 107 101   CO2 19* 21 23   BUN 65* 66* 46* questions. Ruiz Conley MD,  Fellow, [address] [facility]      Attending Physician Statement  I have discussed the case of Ahbijeet Mendes including pertinent history and exam findings with the resident. I have seen and examined the patient and the key elements of the encounter have been performed by me. I agree with the assessment, plan and orders as documented by the resident With changes made to the note.      Electronically signed by Karolina Araiza MD on 6/29/2022 at 2:13 PM.    [facility]      [phone]

## 2022-06-29 NOTE — CARE COORDINATION
Social work consulted to assist with new outpatient dialysis placement. Called and spoke to daughter Mat Ballard. She states that pt lives with her son. She does not drive. Discussed outpatient placement and daughter states that   [facility] the [facility] to them and chose this facility first choice. She states M,W, F preference. She also states that the late afternoon early evening would be best for family to provide transport. Pt has Medicare part A only and [facility]. Referral sent to [facility] .

## 2022-06-30 ENCOUNTER — APPOINTMENT (OUTPATIENT)
Dept: GENERAL RADIOLOGY | Age: 72
DRG: 308 | End: 2022-06-30
Payer: OTHER GOVERNMENT

## 2022-06-30 LAB
ABSOLUTE EOS #: 0 K/UL (ref 0–0.4)
ABSOLUTE IMMATURE GRANULOCYTE: 0 K/UL (ref 0–0.3)
ABSOLUTE LYMPH #: 0.61 K/UL (ref 1–4.8)
ABSOLUTE MONO #: 0.7 K/UL (ref 0.1–0.8)
ALLEN TEST: POSITIVE
ANION GAP SERPL CALCULATED.3IONS-SCNC: 10 MMOL/L (ref 9–17)
BASOPHILS # BLD: 0 % (ref 0–2)
BASOPHILS ABSOLUTE: 0 K/UL (ref 0–0.2)
BUN BLDV-MCNC: 30 MG/DL (ref 8–23)
CALCIUM SERPL-MCNC: 7.8 MG/DL (ref 8.6–10.4)
CHLORIDE BLD-SCNC: 101 MMOL/L (ref 98–107)
CO2: 26 MMOL/L (ref 20–31)
CREAT SERPL-MCNC: 1.48 MG/DL (ref 0.5–0.9)
EOSINOPHILS RELATIVE PERCENT: 0 % (ref 1–4)
FIO2: 40
GFR AFRICAN AMERICAN: 42 ML/MIN
GFR NON-AFRICAN AMERICAN: 35 ML/MIN
GFR SERPL CREATININE-BSD FRML MDRD: ABNORMAL ML/MIN/{1.73_M2}
GLUCOSE BLD-MCNC: 105 MG/DL (ref 70–99)
GLUCOSE BLD-MCNC: 93 MG/DL (ref 74–100)
HCT VFR BLD CALC: 22.2 % (ref 36.3–47.1)
HCT VFR BLD CALC: 24.1 % (ref 36.3–47.1)
HCT VFR BLD CALC: 25.8 % (ref 36.3–47.1)
HEMOGLOBIN: 6.9 G/DL (ref 11.9–15.1)
HEMOGLOBIN: 7.1 G/DL (ref 11.9–15.1)
HEMOGLOBIN: 8 G/DL (ref 11.9–15.1)
IMMATURE GRANULOCYTES: 0 %
LYMPHOCYTES # BLD: 7 % (ref 24–44)
MCH RBC QN AUTO: 30.2 PG (ref 25.2–33.5)
MCHC RBC AUTO-ENTMCNC: 29.5 G/DL (ref 28.4–34.8)
MCV RBC AUTO: 102.6 FL (ref 82.6–102.9)
MODE: ABNORMAL
MONOCYTES # BLD: 8 % (ref 1–7)
MORPHOLOGY: ABNORMAL
NRBC AUTOMATED: 0.2 PER 100 WBC
NUCLEATED RED BLOOD CELLS: 1 PER 100 WBC
O2 DEVICE/FLOW/%: ABNORMAL
PATIENT TEMP: 35.4
PDW BLD-RTO: 16.2 % (ref 11.8–14.4)
PLATELET # BLD: 161 K/UL (ref 138–453)
PMV BLD AUTO: 9.8 FL (ref 8.1–13.5)
POC HCO3: 29.9 MMOL/L (ref 21–28)
POC O2 SATURATION: 99 % (ref 94–98)
POC PCO2 TEMP: 54 MM HG
POC PCO2: 57.7 MM HG (ref 35–48)
POC PH TEMP: 7.35
POC PH: 7.32 (ref 7.35–7.45)
POC PO2 TEMP: 135 MM HG
POC PO2: 144.2 MM HG (ref 83–108)
POSITIVE BASE EXCESS, ART: 3 (ref 0–3)
POTASSIUM SERPL-SCNC: 4.2 MMOL/L (ref 3.7–5.3)
RBC # BLD: 2.35 M/UL (ref 3.95–5.11)
SAMPLE SITE: ABNORMAL
SEG NEUTROPHILS: 85 % (ref 36–66)
SEGMENTED NEUTROPHILS ABSOLUTE COUNT: 7.39 K/UL (ref 1.8–7.7)
SODIUM BLD-SCNC: 137 MMOL/L (ref 135–144)
WBC # BLD: 8.7 K/UL (ref 3.5–11.3)

## 2022-06-30 PROCEDURE — 6370000000 HC RX 637 (ALT 250 FOR IP): Performed by: STUDENT IN AN ORGANIZED HEALTH CARE EDUCATION/TRAINING PROGRAM

## 2022-06-30 PROCEDURE — 6360000002 HC RX W HCPCS: Performed by: INTERNAL MEDICINE

## 2022-06-30 PROCEDURE — 99231 SBSQ HOSP IP/OBS SF/LOW 25: CPT | Performed by: PSYCHIATRY & NEUROLOGY

## 2022-06-30 PROCEDURE — 2500000003 HC RX 250 WO HCPCS: Performed by: STUDENT IN AN ORGANIZED HEALTH CARE EDUCATION/TRAINING PROGRAM

## 2022-06-30 PROCEDURE — 36415 COLL VENOUS BLD VENIPUNCTURE: CPT

## 2022-06-30 PROCEDURE — 2000000000 HC ICU R&B

## 2022-06-30 PROCEDURE — 82803 BLOOD GASES ANY COMBINATION: CPT

## 2022-06-30 PROCEDURE — 85025 COMPLETE CBC W/AUTO DIFF WBC: CPT

## 2022-06-30 PROCEDURE — 99232 SBSQ HOSP IP/OBS MODERATE 35: CPT | Performed by: INTERNAL MEDICINE

## 2022-06-30 PROCEDURE — 90935 HEMODIALYSIS ONE EVALUATION: CPT

## 2022-06-30 PROCEDURE — 94640 AIRWAY INHALATION TREATMENT: CPT

## 2022-06-30 PROCEDURE — P9016 RBC LEUKOCYTES REDUCED: HCPCS

## 2022-06-30 PROCEDURE — 85014 HEMATOCRIT: CPT

## 2022-06-30 PROCEDURE — 82947 ASSAY GLUCOSE BLOOD QUANT: CPT

## 2022-06-30 PROCEDURE — 6360000002 HC RX W HCPCS: Performed by: STUDENT IN AN ORGANIZED HEALTH CARE EDUCATION/TRAINING PROGRAM

## 2022-06-30 PROCEDURE — 86850 RBC ANTIBODY SCREEN: CPT

## 2022-06-30 PROCEDURE — 86920 COMPATIBILITY TEST SPIN: CPT

## 2022-06-30 PROCEDURE — 85018 HEMOGLOBIN: CPT

## 2022-06-30 PROCEDURE — 2580000003 HC RX 258: Performed by: STUDENT IN AN ORGANIZED HEALTH CARE EDUCATION/TRAINING PROGRAM

## 2022-06-30 PROCEDURE — 80048 BASIC METABOLIC PNL TOTAL CA: CPT

## 2022-06-30 PROCEDURE — 94003 VENT MGMT INPAT SUBQ DAY: CPT

## 2022-06-30 PROCEDURE — 94761 N-INVAS EAR/PLS OXIMETRY MLT: CPT

## 2022-06-30 PROCEDURE — 86901 BLOOD TYPING SEROLOGIC RH(D): CPT

## 2022-06-30 PROCEDURE — 99291 CRITICAL CARE FIRST HOUR: CPT | Performed by: INTERNAL MEDICINE

## 2022-06-30 PROCEDURE — 2700000000 HC OXYGEN THERAPY PER DAY

## 2022-06-30 PROCEDURE — 89220 SPUTUM SPECIMEN COLLECTION: CPT

## 2022-06-30 PROCEDURE — 71045 X-RAY EXAM CHEST 1 VIEW: CPT

## 2022-06-30 PROCEDURE — 36430 TRANSFUSION BLD/BLD COMPNT: CPT

## 2022-06-30 PROCEDURE — 86900 BLOOD TYPING SEROLOGIC ABO: CPT

## 2022-06-30 RX ORDER — DEXMEDETOMIDINE HYDROCHLORIDE 4 UG/ML
.1-1.5 INJECTION, SOLUTION INTRAVENOUS CONTINUOUS
Status: DISCONTINUED | OUTPATIENT
Start: 2022-06-30 | End: 2022-07-08

## 2022-06-30 RX ORDER — SODIUM CHLORIDE 9 MG/ML
INJECTION, SOLUTION INTRAVENOUS PRN
Status: DISCONTINUED | OUTPATIENT
Start: 2022-06-30 | End: 2022-07-18 | Stop reason: HOSPADM

## 2022-06-30 RX ORDER — MIDODRINE HYDROCHLORIDE 5 MG/1
5 TABLET ORAL
Status: DISCONTINUED | OUTPATIENT
Start: 2022-06-30 | End: 2022-07-07

## 2022-06-30 RX ADMIN — DEXMEDETOMIDINE HYDROCHLORIDE 0.2 MCG/KG/HR: 4 INJECTION, SOLUTION INTRAVENOUS at 12:54

## 2022-06-30 RX ADMIN — MORPHINE SULFATE 2 MG: 2 INJECTION, SOLUTION INTRAMUSCULAR; INTRAVENOUS at 20:53

## 2022-06-30 RX ADMIN — MAGNESIUM GLUCONATE 500 MG ORAL TABLET 400 MG: 500 TABLET ORAL at 08:18

## 2022-06-30 RX ADMIN — ATORVASTATIN CALCIUM 40 MG: 80 TABLET, FILM COATED ORAL at 08:17

## 2022-06-30 RX ADMIN — SODIUM CHLORIDE, PRESERVATIVE FREE 10 ML: 5 INJECTION INTRAVENOUS at 08:18

## 2022-06-30 RX ADMIN — SODIUM CHLORIDE, PRESERVATIVE FREE 10 ML: 5 INJECTION INTRAVENOUS at 20:57

## 2022-06-30 RX ADMIN — FENTANYL CITRATE 50 MCG: 50 INJECTION, SOLUTION INTRAMUSCULAR; INTRAVENOUS at 03:14

## 2022-06-30 RX ADMIN — MIDODRINE HYDROCHLORIDE 5 MG: 5 TABLET ORAL at 14:49

## 2022-06-30 RX ADMIN — ALBUTEROL SULFATE 2.5 MG: 2.5 SOLUTION RESPIRATORY (INHALATION) at 19:56

## 2022-06-30 RX ADMIN — FERROUS SULFATE TAB EC 325 MG (65 MG FE EQUIVALENT) 325 MG: 325 (65 FE) TABLET DELAYED RESPONSE at 08:17

## 2022-06-30 RX ADMIN — BUDESONIDE AND FORMOTEROL FUMARATE DIHYDRATE 2 PUFF: 160; 4.5 AEROSOL RESPIRATORY (INHALATION) at 09:00

## 2022-06-30 RX ADMIN — HEPARIN SODIUM 1200 UNITS: 1000 INJECTION INTRAVENOUS; SUBCUTANEOUS at 18:15

## 2022-06-30 RX ADMIN — ANTI-FUNGAL POWDER MICONAZOLE NITRATE TALC FREE: 1.42 POWDER TOPICAL at 21:04

## 2022-06-30 RX ADMIN — ALBUTEROL SULFATE 2.5 MG: 2.5 SOLUTION RESPIRATORY (INHALATION) at 09:00

## 2022-06-30 RX ADMIN — CEFEPIME HYDROCHLORIDE 1000 MG: 1 INJECTION, POWDER, FOR SOLUTION INTRAMUSCULAR; INTRAVENOUS at 11:08

## 2022-06-30 RX ADMIN — FENTANYL CITRATE 50 MCG: 50 INJECTION, SOLUTION INTRAMUSCULAR; INTRAVENOUS at 09:47

## 2022-06-30 RX ADMIN — ANTI-FUNGAL POWDER MICONAZOLE NITRATE TALC FREE: 1.42 POWDER TOPICAL at 08:19

## 2022-06-30 RX ADMIN — MAGNESIUM GLUCONATE 500 MG ORAL TABLET 400 MG: 500 TABLET ORAL at 20:57

## 2022-06-30 RX ADMIN — HEPARIN SODIUM 1300 UNITS: 1000 INJECTION INTRAVENOUS; SUBCUTANEOUS at 18:15

## 2022-06-30 RX ADMIN — TRANEXAMIC ACID 1000 MG: 100 INJECTION, SOLUTION INTRAVENOUS at 00:25

## 2022-06-30 RX ADMIN — ALBUTEROL SULFATE 2.5 MG: 2.5 SOLUTION RESPIRATORY (INHALATION) at 14:33

## 2022-06-30 RX ADMIN — Medication 1 EACH: at 03:14

## 2022-06-30 RX ADMIN — Medication 81 MG: at 08:17

## 2022-06-30 RX ADMIN — MIDODRINE HYDROCHLORIDE 5 MG: 5 TABLET ORAL at 17:07

## 2022-06-30 ASSESSMENT — PULMONARY FUNCTION TESTS
PIF_VALUE: 21
PIF_VALUE: 21
PIF_VALUE: 22
PIF_VALUE: 22
PIF_VALUE: 23
PIF_VALUE: 22
PIF_VALUE: 19
PIF_VALUE: 18
PIF_VALUE: 21
PIF_VALUE: 22
PIF_VALUE: 20
PIF_VALUE: 23
PIF_VALUE: 22
PIF_VALUE: 22
PIF_VALUE: 17
PIF_VALUE: 20
PIF_VALUE: 21
PIF_VALUE: 22
PIF_VALUE: 22
PIF_VALUE: 21

## 2022-06-30 ASSESSMENT — PAIN - FUNCTIONAL ASSESSMENT: PAIN_FUNCTIONAL_ASSESSMENT: PREVENTS OR INTERFERES WITH ALL ACTIVE AND SOME PASSIVE ACTIVITIES

## 2022-06-30 NOTE — PLAN OF CARE
Problem: Safety - Medical Restraint  Goal: Remains free of injury from restraints (Restraint for Interference with Medical Device)  Description: INTERVENTIONS:  1. Determine that other, less restrictive measures have been tried or would not be effective before applying the restraint  2. Evaluate the patient's condition at the time of restraint application  3. Inform patient/family regarding the reason for restraint  4.  Q2H: Monitor safety, psychosocial status, comfort, nutrition and hydration  Outcome: Progressing  Flowsheets (Taken 6/30/2022 0800 by Melodie Flores RN)  Remains free of injury from restraints (restraint for interference with medical device): Determine that other, less restrictive measures have been tried or would not be effective before applying the restraint

## 2022-06-30 NOTE — CARE COORDINATION
Transitional planning. Intubated/ Sedated FiO2 30%, PEEP of 8, following commands. Wound Ostomy for Chronic wounds, Current w/ [facility], No SNF benefits left. May have additional VA benefits- daughter is to call VA back to concerning VA coverage/ benefits  May be elligible for Medicaid - Hilda with [employer] spoke to pt's daughter. Informed pt's daughter of what information would be needed to start medicaid process. Explained the difference between hospital ICU/ LTACH and SNF to Daughter Sherry Gould. Provided her with [facility] list. She will review and provide choice.

## 2022-06-30 NOTE — PLAN OF CARE
Problem: Discharge Planning  Goal: Discharge to home or other facility with appropriate resources  Outcome: Progressing     Problem: Chronic Conditions and Co-morbidities  Goal: Patient's chronic conditions and co-morbidity symptoms are monitored and maintained or improved  Outcome: Progressing     Problem: Pain  Goal: Verbalizes/displays adequate comfort level or baseline comfort level  Outcome: Progressing     Problem: Skin/Tissue Integrity  Goal: Absence of new skin breakdown  Description: 1. Monitor for areas of redness and/or skin breakdown  2. Assess vascular access sites hourly  3. Every 4-6 hours minimum:  Change oxygen saturation probe site  4. Every 4-6 hours:  If on nasal continuous positive airway pressure, respiratory therapy assess nares and determine need for appliance change or resting period.   Outcome: Progressing     Problem: ABCDS Injury Assessment  Goal: Absence of physical injury  Outcome: Progressing  Flowsheets (Taken 6/30/2022 0800)  Absence of Physical Injury: Implement safety measures based on patient assessment     Problem: Respiratory - Adult  Goal: Achieves optimal ventilation and oxygenation  6/30/2022 0828 by Mary Tristan RCP  Outcome: Progressing  Flowsheets (Taken 6/30/2022 0800 by Abran Sprague RN)  Achieves optimal ventilation and oxygenation:   Assess for changes in respiratory status   Assess for changes in mentation and behavior   Position to facilitate oxygenation and minimize respiratory effort   Oxygen supplementation based on oxygen saturation or arterial blood gases   Assess the need for suctioning and aspirate as needed   Respiratory therapy support as indicated

## 2022-06-30 NOTE — PLAN OF CARE
Problem: Respiratory - Adult  Goal: Achieves optimal ventilation and oxygenation  6/29/2022 2012 by Mary Cobb RCP  Outcome: Progressing

## 2022-06-30 NOTE — PROGRESS NOTES
Neurology follow-up note    6/30/2022      Chief complaint Altered mental status in the setting of progressive renal failure hypotension and bradycardia. HPI    I have followed this patient since 6/28/2022. She presented with bradycardia hypotension progressive renal failure and altered mental state. Overall her clinical presentation was consistent with a nonspecific metabolic encephalopathy. Patient is currently orally intubated. Both daughters in attendance at bedside. The patient is awake and alert and nodding her head and communicating nonverbally with her daughters. Patient can squeeze with her hands. She seems to be back to baseline with regard to mental status as far as can be determined at this moment. Neuro exam    Blood pressure (!) 133/49, pulse 80, temperature (!) 95.7 °F (35.4 °C), temperature source Esophageal, resp. rate 26, height 4' 11\" (1.499 m), weight 118 lb 2.7 oz (53.6 kg), SpO2 98 %. Mental status: Awake and alert. Responding to daughters appropriately at bedside. Patient is requesting that wrist restraints be removed. She is in restraints because she had a tendency to pull at the endotracheal tube. She is orally intubated. CN II through XII: Pupils are equal and round. EOMI. Facial expression appears intact. Hearing appears to be intact. Oropharynx cannot be examined because of ET tube. Motor: Able to move both upper extremities. Limited movement of legs. Patient appears to be deconditioned at this point. Cerebellar: No obvious tremors noted. Reflexes: No changes from prior exam.    Sensory no sensory changes noted at this moment. Station and gait nonambulatory. Impression  1. Metabolic encephalopathy peers to be resolved at this time. 2.  Patient does have a right anterior temporal fossa meningioma which may have increased slightly in size according radiology report.   It does not appear to be a contributor to the patient's recent change in mental status. No seizure activity noted clinically. 3.  Orally intubated. 4.   Deconditioning. 5.  Multiple medical issues as noted above. Recommendations  1. Continued medical support that she is receiving peers were effective. 2.  No new action per neurology at this time. Neurology will sign off. Please reconsult as needed. 3.  Patient should follow-up with neurology or neurosurgery as outpatient when she is discharged.     Electronically signed by Richardson Streetre MD on 6/30/2022 at 9:23 AM

## 2022-06-30 NOTE — CONSENT
Informed Consent for Blood Component Transfusion Note    I have discussed with the daughter the rationale for blood component transfusion; its benefits in treating or preventing fatigue, organ damage, or death; and its risk which includes mild transfusion reactions, rare risk of blood borne infection, or more serious but rare reactions. I have discussed the alternatives to transfusion, including the risk and consequences of not receiving transfusion. The daughter had an opportunity to ask questions and had agreed to proceed with transfusion of blood components.     Electronically signed by Pato Desouza MD on 6/30/22 at 2:32 PM EDT

## 2022-06-30 NOTE — PROGRESS NOTES
Dialysis Post Treatment Note  Vitals:    06/30/22 1845   BP: 131/60   Pulse: 60   Resp: 26   Temp: (!) 95.9 °F (35.5 °C)   SpO2: 99%     Pre-Weight = 72.5kg  Post-weight = Weight: 155 lb 3.3 oz (70.4 kg)  Total Liters Processed = Total Liters Processed (l/min): 45.8 l/min  Rinseback Volume (mL) = Rinseback Volume (ml): 300 ml  Net Removal (mL) =  2000ml  Patient's dry weight=TBD  Type of access used=CVC right  Length of treatment=120min    Ultrafiltration treatment completed with no issues. Pt. Tolerated tx well. No adverse events pre, during, or post treatment. IJ dressing kept intact and dry, not changed due to bleeding issues as per floor nurse Harjit Connors. Able to remove 2L of fluid. Report given to floor nurse Harjit Connors.

## 2022-06-30 NOTE — PLAN OF CARE
Problem: Respiratory - Adult  Goal: Achieves optimal ventilation and oxygenation  6/30/2022 0828 by Wilbert Vincent RCP  Outcome: Progressing   BRONCHOSPASM/BRONCHOCONSTRICTION     [x]         IMPROVE AERATION/BREATH SOUNDS  [x]   ADMINISTER BRONCHODILATOR THERAPY AS APPROPRIATE  [x]   ASSESS BREATH SOUNDS  []   IMPLEMENT AEROSOL/MDI PROTOCOL  [x]   PATIENT EDUCATION AS NEEDED   PROVIDE ADEQUATE OXYGENATION WITH ACCEPTABLE SP02/ABG'S    [x]  IDENTIFY APPROPRIATE OXYGEN THERAPY  [x]   MONITOR SP02/ABG'S AS NEEDED   [x]   PATIENT EDUCATION AS NEEDED

## 2022-06-30 NOTE — PROGRESS NOTES
Renal Progress Note    Patient :  Reji Shearer; 66 y.o. MRN# [id number]  Location:  3020/3020-01  Attending:  Landen Gabriel MD  Admit Date:  6/27/2022   Hospital Day: 3    Subjective:     Urine output has dwindled significantly less than 5 mL an hour, continues on low-dose pressors maintaining a systolic pressure of 075. Continues on ventilator support FiO2 30% PEEP 5. Heart rate better controlled. Had dialysis yesterday but 1.6 L removed. Still has significant lower extremity edema. Continues on Versed for sedation, ABGs reviewed today pH 7.3 PCO2 27 PO2 144 bicarbonate 29. Currently no nutritional support. Not showing any signs of renal recovery. Known history of type 2 diabetes with poor control, stage IV chronic kidney disease proteinuria about 20 g diastolic dysfunction chronic lower extremity edema and nephrotic syndrome. Found to the hospital with dyspnea also found to be excessively fatigued. Was found to be bradycardic with a heart rate of 55. Initially was tried on noninvasive positive pressure ventilation and ultimately ended up being intubated. Also required pressor support. Due to progressive decline in Kidney function and fluid overload and poor muscle mass along with oliguria dialysis initiated this admission.   Chest x-ray consistent with pulmonary edema    Outpatient Medications:     Medications Prior to Admission: ferrous sulfate (IRON 325) 325 (65 Fe) MG tablet, Take 1 tablet by mouth daily (with breakfast)  apixaban (ELIQUIS) 2.5 MG TABS tablet, Take 1 tablet by mouth 2 times daily  apixaban (ELIQUIS) 2.5 MG TABS tablet, Take 1 tablet by mouth 2 times daily  ferrous sulfate (IRON 325) 325 (65 Fe) MG tablet, Take 1 tablet by mouth daily (with breakfast)  fluticasone-salmeterol (ADVAIR HFA) 115-21 MCG/ACT inhaler, Inhale 2 puffs into the lungs 2 times daily  metoprolol tartrate (LOPRESSOR) 25 MG tablet, Take 1 tablet by mouth 2 times daily  aspirin EC 81 MG EC tablet, Take 1 tablet by mouth daily  hydrALAZINE (APRESOLINE) 25 MG tablet, Take 1 tablet by mouth 3 times daily  albuterol sulfate HFA (PROAIR HFA) 108 (90 Base) MCG/ACT inhaler, Inhale 2 puffs into the lungs every 6 hours as needed for Wheezing or Shortness of Breath (cough)  metoprolol tartrate (LOPRESSOR) 25 MG tablet, Take 1 tablet by mouth 2 times daily  aspirin EC 81 MG EC tablet, Take 1 tablet by mouth daily  hydrALAZINE (APRESOLINE) 25 MG tablet, Take 1 tablet by mouth 3 times daily  albuterol sulfate HFA (PROAIR HFA) 108 (90 Base) MCG/ACT inhaler, Inhale 2 puffs into the lungs every 6 hours as needed for Wheezing or Shortness of Breath (cough)  fluticasone-salmeterol (ADVAIR HFA) 115-21 MCG/ACT inhaler, Inhale 2 puffs into the lungs 2 times daily Maintenance inhaler  amLODIPine (NORVASC) 10 MG tablet, Take 1 tablet by mouth nightly  Blood Pressure KIT, Diagnosis: HTN. Needs to check blood pressure 1-2 times a day until stable, then once a day. Goal blood pressure less than 135/85, and above 110/60. vitamin D (ERGOCALCIFEROL) 1.25 MG (58158 UT) CAPS capsule, Take 1 capsule by mouth once a week Sundays  amiodarone (CORDARONE) 200 MG tablet, Take 1 tablet by mouth daily  Nebulizers (COMPRESSOR/NEBULIZER) MISC, Nebulizer with compressor. Disposable Med Nebs 2 /month. Reusable Med Nebs 1 per 6 months. Aerosol Mask 1 per month. Replacement Filters 2 per month. All other related supplies as needed per month. Medications being used: Albuterol . 083 unit dose vial. Frequency: every 6 hrs x 4/day . Length of Need: 1 (Patient not taking: Reported on 6/17/2022)  albuterol (PROVENTIL) (2.5 MG/3ML) 0.083% nebulizer solution, Take 3 mLs by nebulization every 6 hours as needed for Wheezing or Shortness of Breath (Patient not taking: Reported on 6/17/2022)  Misc.  Devices (ADJUST FOLD CANE/YORK HANDLE) MISC, Use daily for walking  Handicap Placard MISC, by Does not apply route Expires on 12/30/25  atorvastatin (LIPITOR) 40 MG tablet, Take 1 tablet by mouth once daily  desloratadine (CLARINEX) 5 MG tablet, Take 1 tablet by mouth once daily  FreeStyle Lancets MISC, 1 each by Does not apply route daily Patient needs to contact office before any further refills will be approved  magnesium oxide (MAG-OX) 400 (241.3 Mg) MG TABS tablet, Take 1 tablet by mouth twice daily  miconazole (MICOTIN) 2 % powder, Apply topically 2 times daily UNDER THE SKIN FOLDS LONG TERM  Multiple Vitamins-Minerals (THERAPEUTIC MULTIVITAMIN-MINERALS) tablet, Take 1 tablet by mouth daily  blood glucose test strips (FREESTYLE LITE) strip, 1 each by In Vitro route daily As needed.   Blood Pressure KIT, 1 kit by Does not apply route three times daily (Patient not taking: Reported on 6/17/2022)  blood glucose monitor kit and supplies, 1 kit by Other route three times daily Dispense Butterfly Elite CBG Device    Current Medications:     Scheduled Meds:    midodrine  5 mg Per NG tube TID WC    sodium polystyrene  15 g Oral Once    cefepime  1,000 mg IntraVENous Q24H    [Held by provider] amiodarone  100 mg Oral Daily    rocuronium  0.6 mg/kg IntraVENous Once    albuterol  2.5 mg Nebulization Q6H    sodium chloride flush  5-40 mL IntraVENous 2 times per day    aspirin EC  81 mg Oral Daily    atorvastatin  40 mg Oral Daily    magnesium oxide  400 mg Oral BID    miconazole   Topical BID    ferrous sulfate  325 mg Oral Daily with breakfast     Continuous Infusions:    dexmedetomidine      DOPamine      norepinephrine 4 mcg/min (06/30/22 0600)    midazolam 1 mg/hr (06/30/22 0600)    dextrose      sodium chloride Stopped (06/29/22 2041)    [Held by provider] heparin (PORCINE) Infusion Stopped (06/29/22 1131)     PRN Meds:  fentanNYL, heparin (porcine), heparin (porcine), glucose, dextrose bolus **OR** dextrose bolus, glucagon (rDNA), dextrose, sodium chloride flush, sodium chloride, ondansetron **OR** ondansetron, polyethylene glycol, acetaminophen **OR** acetaminophen, heparin (porcine), heparin (porcine), albuterol sulfate HFA, morphine    Input/Output:       I/O last 3 completed shifts: In: 1698.3 [I.V.:813.3; NG/GT:310; IV Piggyback:15]  Out:  [Urine:224]    Vital Signs:   Temperature:  Temp: (!) 95.7 °F (35.4 °C)  TMax:   Temp (24hrs), Av.9 °F (35.5 °C), Min:95.4 °F (35.2 °C), Max:97 °F (36.1 °C)    Respirations:  Resp: 24  Pulse:   Heart Rate: 82  BP:    BP: 130/85  BP Range: Systolic (51CAA), VUJ:893 , Min:91 , LSE:682       Diastolic (69NQU), IJT:18, Min:37, Max:92      Physical Examination:     General:  Sedated on ventilator. FiO2 30% with a PEEP of 5  HEENT:  Atraumatic, normocephalic, no throat congestion, moist mucosa. Eyes:   Pupils equal, round and reactive to light, pallor, no icterus. Neck:   No JVD, no thyromegaly, no lymphadenopathy. Chest:  Air entry fair bilaterally, basal crackles  Cardiac: S1 S2 RR no murmurs  Abdomen:  Soft, non-tender, no masses or organomegally appreciable, BS sluggish. :   No suprapubic or flank tenderness. Neuro:  Sedated on ventilator. Skin:   No rashes, good skin turgor. Extremities:  2-3+ edema, palpable peripheral pulses, no cyanosis, no calf tenderness. Labs:       Recent Labs     22  0427 22  0427 22  0335 22   WBC 21.4*  --  12.1*  --  8.7   RBC 3.29*  --  2.88*  --  2.35*   HGB 10.2*   < > 8.7* 8.1* 7.1*   HCT 35.0*   < > 29.4* 26.9* 24.1*   .4*  --  102.1  --  102.6   MCH 31.0  --  30.2  --  30.2   MCHC 29.1  --  29.6  --  29.5   RDW 16.6*  --  16.3*  --  16.2*     --  211  --  161   MPV 10.1  --  9.8  --  9.8    < > = values in this interval not displayed.       BMP:   Recent Labs     22  1456 22  0335 22  0429    134* 137   K 5.4* 4.3 4.2    101 101   CO2 21 23 26   BUN 66* 46* 30*   CREATININE 2.49* 1.93* 1.48*   GLUCOSE 78 88 105*   CALCIUM 8.8 7.9* 7.8*      Phosphorus:   No results for input(s): PHOS in the last 72 hours.  Magnesium:  No results for input(s): MG in the last 72 hours. Albumin:    Recent Labs     06/27/22  1459   LABALBU 2.6*     BNP:    No results found for: BNP  YRIS:      Lab Results   Component Value Date/Time    YRIS NEGATIVE 10/27/2020 04:37 PM     SPEP:  Lab Results   Component Value Date/Time    PROT 5.3 06/27/2022 02:59 PM    ALBCAL 2.5 06/01/2022 03:00 PM    ALBPCT 48 06/01/2022 03:00 PM    LABALPH 0.3 06/01/2022 03:00 PM    LABALPH 1.2 06/01/2022 03:00 PM    A1PCT 6 06/01/2022 03:00 PM    A2PCT 23 06/01/2022 03:00 PM    LABBETA 0.7 06/01/2022 03:00 PM    BETAPCT 13 06/01/2022 03:00 PM    GAMGLOB 0.5 06/01/2022 03:00 PM    GGPCT 10 06/01/2022 03:00 PM    PATH ELECTRONICALLY SIGNED. Ava Aguilar M.D. 06/01/2022 03:00 PM    PATH ELECTRONICALLY SIGNED. Ava Aguilar M.D. 06/01/2022 03:00 PM     UPEP:     Lab Results   Component Value Date/Time    LABPE  10/27/2020 04:35 PM     ELEVATED PROTEIN CONCENTRATION. MOST SERUM PROTEINS ARE DETECTED IN THIS URINE.   USUALLY OBSERVED WITH MARKEDLY INCREASED NON-SELECTIVE GLOMERULAR PERMEABILITY (i.e. SEVERE GLOMERULAR DISEASE), CONTAMINATION OF     C3:     Lab Results   Component Value Date/Time    C3 147 10/27/2020 04:37 PM     C4:     Lab Results   Component Value Date/Time    C4 29 10/27/2020 04:37 PM     MPO ANCA:     Lab Results   Component Value Date/Time    MPO 5 10/27/2020 04:37 PM     PR3 ANCA:     Lab Results   Component Value Date/Time    PR3 16 10/27/2020 04:37 PM     Anti-GBM:   No results found for: GBMABIGG  Hep BsAg:         Lab Results   Component Value Date/Time    HEPBSAG NONREACTIVE 06/28/2022 02:56 PM     Hep C AB:          Lab Results   Component Value Date/Time    HEPCAB NONREACTIVE 06/28/2022 02:56 PM       Urinalysis/Chemistries:      Lab Results   Component Value Date/Time    NITRU NEGATIVE 06/27/2022 06:35 PM    COLORU Dark Yellow 06/27/2022 06:35 PM    PHUR 5.5 06/27/2022 06:35 PM    WBCUA 20 TO 50 06/27/2022 06:35 PM    RBCUA

## 2022-06-30 NOTE — PROGRESS NOTES
Critical Care Team - Daily Progress Note      Date and time: 6/30/2022 9:09 AM  Patient's name:  Angel Friedman  Medical Record Number: [id number]  Patient's account/billing number: [de-identified]  Patient's YOB: 1950  Age: 66 y.o. Date of Admission: 6/27/2022 12:28 PM  Length of stay during current admission: 3      Primary Care Physician: Josephine Rodriguez MD  ICU Attending Physician: Dr. El Colon Status: Full Code    Reason for ICU admission:   Chief Complaint   Patient presents with    Bradycardia       Subjective:     OVERNIGHT EVENTS: No acute events overnight. Right IJ still oozing, no hematoma. Heparin drip still held. Dialysis yesterday 1.2 L out. On 3 of levo, 1 of Versed. Opening eyes to verbal commands. Started on tube feeds yesterday. FiO2 requirements coming down 30%, ABG pH 7.32 PCO2 27.7, PaO2 144.5. HCO3 29.9. Creatinine 1.93> 1.48. Glucose 105. Heart rate 80.     AWAKE & FOLLOWING COMMANDS:  [x] No (, opens eyes to verbal commands) [] Yes    CURRENT VENTILATION STATUS:     [x] Ventilator  [] BIPAP  [] Nasal Cannula [] Room Air          SECRETIONS Amount:  [] Small [] Moderate  [] Large  [x] None  Color:     [] White [] Colored  [] Bloody    SEDATION:  RAAS Score:  [] Propofol gtt  [x] Versed gtt  [] Ativan gtt   [] No Sedation    PARALYZED:  [x] No    [] Yes    DIARRHEA:                [x] No                [] Yes  (C. Difficile status: [] positive                                                                                                                       [] negative                                                                                                                     [] pending)    VASOPRESSORS:  [] No    [x] Yes    If yes -   [x] Levophed       [] Dopamine     [] Vasopressin       [] Dobutamine  [] Phenylephrine         [] Epinephrine    CENTRAL LINES:     [] No   [x] Yes   (Date of Insertion:   )           If yes -     [] Right IJ     [] Left IJ [] Right Femoral [] Left Femoral                   [] Right Subclavian [] Left Subclavian       GUTIERREZ'S CATHETER:   [] No   [x] Yes  (Date of Insertion:   )     URINE OUTPUT:            [] Good   [x] Low              [] Anuric    REVIEW OF SYSTEMS:  Unable to obtain due to patient mentation.      OBJECTIVE:     VITAL SIGNS:  BP (!) 133/49   Pulse 78   Temp (!) 95.7 °F (35.4 °C) (Esophageal)   Resp 26   Ht 4' 11\" (1.499 m)   Wt 118 lb 2.7 oz (53.6 kg)   SpO2 99%   BMI 23.87 kg/m²   Tmax over 24 hours:  Temp (24hrs), Av.7 °F (35.4 °C), Min:95 °F (35 °C), Max:97 °F (36.1 °C)      Patient Vitals for the past 8 hrs:   BP Temp Temp src Pulse Resp SpO2   22 0902 -- -- -- 78 26 99 %   22 0715 (!) 133/49 -- -- 76 26 99 %   22 0700 (!) 135/53 -- -- 71 27 100 %   22 0645 (!) 132/44 -- -- 71 26 100 %   22 0630 (!) 131/45 -- -- 72 26 99 %   22 0615 (!) 135/53 -- -- 75 25 100 %   22 0600 (!) 124/49 -- -- 73 26 99 %   22 0545 (!) 125/47 -- -- 73 25 99 %   22 0530 121/77 -- -- 76 26 99 %   22 0515 (!) 114/42 -- -- 78 24 99 %   22 0500 (!) 121/47 -- -- 81 23 99 %   22 0445 (!) 91/54 -- -- 83 24 98 %   22 0430 112/72 -- -- 84 24 99 %   22 0415 (!) 119/56 -- -- 76 24 99 %   22 0400 (!) 127/54 (!) 95.7 °F (35.4 °C) Esophageal 72 26 99 %   22 0345 (!) 131/43 -- -- 71 26 100 %   22 0330 (!) 141/51 -- -- 72 (!) 9 99 %   22 0315 (!) 137/42 -- -- 77 28 100 %   22 0311 -- -- -- 82 24 99 %   22 0300 (!) 107/52 -- -- 81 24 100 %   22 0245 (!) 129/43 -- -- 82 24 100 %   22 0230 (!) 134/44 -- -- 79 26 100 %   22 0215 (!) 143/40 -- -- 76 26 100 %   22 0200 (!) 127/41 -- -- 75 26 100 %   22 0145 (!) 134/41 -- -- 78 26 100 %   22 0130 (!) 145/92 -- -- 82 23 99 %   22 0115 -- -- -- 83 (!) 32 100 %         Intake/Output Summary (Last 24 hours) at 2022 8051  Last data filed at 6/30/2022 0902  Gross per 24 hour   Intake 1351.13 ml   Output 1624 ml   Net -272.87 ml     Date 06/30/22 0000 - 06/30/22 2359   Shift 4290-0651 2258-6117 2239-0151 24 Hour Total   INTAKE   I.V.(mL/kg) 60.2(1.1)   60.2(1.1)   NG/GT(mL/kg) 201(3.8) 211(3.9)  412(7.7)   Shift Total(mL/kg) 261.2(4.9) 211(3.9)  472.2(8.8)   OUTPUT   Urine(mL/kg/hr) 44(0.1) 5  49   Shift Total(mL/kg) 44(0.8) 5(0.1)  49(0.9)   Weight (kg) 53.6 53.6 53.6 53.6     Wt Readings from Last 3 Encounters:   06/29/22 118 lb 2.7 oz (53.6 kg)   06/22/22 152 lb (68.9 kg)   06/21/22 152 lb (68.9 kg)     Body mass index is 23.87 kg/m². PHYSICAL EXAM:  Constitutional: Intubated, sedated, opens eyes to verbal commands  HEENT: PERRLA, EOMI, sclera clear, anicteric  Respiratory: Bilateral mechanical breath sounds  Cardiovascular: regular rate and rhythm, normal S1, S2, no murmur noted and 2+ pulses throughout  Abdomen: soft, nontender, nondistended, no masses or organomegaly  NEUROLOGIC: drowsy, but waking up to strong verbal cues, hard of hearing  Cranial nerves II-XII are grossly intact. Extremities:  peripheral pulses normal, 2+ pedal edema,.       Any additional physical findings:      MEDICATIONS:  Scheduled Meds:   sodium polystyrene  15 g Oral Once    cefepime  1,000 mg IntraVENous Q24H    [Held by provider] amiodarone  100 mg Oral Daily    rocuronium  0.6 mg/kg IntraVENous Once    albuterol  2.5 mg Nebulization Q6H    sodium chloride flush  5-40 mL IntraVENous 2 times per day    budesonide-formoterol  2 puff Inhalation BID    aspirin EC  81 mg Oral Daily    atorvastatin  40 mg Oral Daily    magnesium oxide  400 mg Oral BID    miconazole   Topical BID    ferrous sulfate  325 mg Oral Daily with breakfast     Continuous Infusions:   DOPamine      norepinephrine 4 mcg/min (06/30/22 0600)    midazolam 1 mg/hr (06/30/22 0600)    dextrose      sodium chloride Stopped (06/29/22 2041)    [Held by provider] heparin (PORCINE) Infusion Stopped (06/29/22 1131)    EPINEPHrine Stopped (06/28/22 0934)     PRN Meds:   fentanNYL, 50 mcg, Q2H PRN  heparin (porcine), 1,300 Units, PRN  heparin (porcine), 1,200 Units, PRN  glucose, 4 tablet, PRN  dextrose bolus, 125 mL, PRN   Or  dextrose bolus, 250 mL, PRN  glucagon (rDNA), 1 mg, PRN  dextrose, 100 mL/hr, PRN  sodium chloride flush, 5-40 mL, PRN  sodium chloride, , PRN  ondansetron, 4 mg, Q8H PRN   Or  ondansetron, 4 mg, Q6H PRN  polyethylene glycol, 17 g, Daily PRN  acetaminophen, 650 mg, Q6H PRN   Or  acetaminophen, 650 mg, Q6H PRN  heparin (porcine), 4,000 Units, PRN  heparin (porcine), 2,000 Units, PRN  albuterol sulfate HFA, 2 puff, Q6H PRN  morphine, 2 mg, Q4H PRN        SUPPORT DEVICES: [x] Ventilator [] BIPAP  [] Nasal Cannula [] Room Air    VENT SETTINGS (Comprehensive) (if applicable):  Vent Information  Ventilator ID: [email or web address]  Vent Mode: PRVC  Additional Respiratory Assessments  Heart Rate: 78  Resp: 26  SpO2: 99 %  End Tidal CO2: 39 (%)  Position: Semi-Leung's  Humidification Source: HME  Cuff Pressure (cm H2O):  (mov)    ABGs:     Lab Results   Component Value Date/Time    PHART 7.459 03/09/2022 04:28 AM    YZE1HHM 39.9 03/09/2022 04:28 AM    PO2ART 107.0 03/09/2022 04:28 AM    PJV8MQX 28.3 03/09/2022 04:28 AM    M9KUOKEM 97.4 03/09/2022 04:28 AM    FIO2 40.0 06/30/2022 04:18 AM     Lactic Acid:   Lab Results   Component Value Date/Time    LACTA 0.7 03/04/2022 02:30 PM    LACTA 1.2 02/18/2021 01:00 AM    LACTA 1.8 07/25/2020 11:20 AM         DATA:  Complete Blood Count:   Recent Labs     06/28/22  0427 06/28/22  0427 06/29/22  0335 06/29/22 2035 06/30/22  0429   WBC 21.4*  --  12.1*  --  8.7   HGB 10.2*   < > 8.7* 8.1* 7.1*   .4*  --  102.1  --  102.6     --  211  --  161   RBC 3.29*  --  2.88*  --  2.35*   HCT 35.0*   < > 29.4* 26.9* 24.1*   MCH 31.0  --  30.2  --  30.2   MCHC 29.1  --  29.6  --  29.5   RDW 16.6*  --  16.3*  --  16.2*   MPV 10.1  --  9.8  --  9.8 < > = values in this interval not displayed. PT/INR:    Lab Results   Component Value Date/Time    PROTIME 10.8 06/14/2022 02:56 AM    INR 1.0 06/14/2022 02:56 AM     PTT:    Lab Results   Component Value Date/Time    APTT 29.7 06/29/2022 04:13 PM       Basal Metabolic Profile:   Recent Labs     06/28/22  1456 06/29/22  0335 06/30/22  0429    134* 137   K 5.4* 4.3 4.2   BUN 66* 46* 30*   CREATININE 2.49* 1.93* 1.48*    101 101   CO2 21 23 26      Magnesium:   Lab Results   Component Value Date/Time    MG 2.3 06/09/2022 06:00 AM    MG 2.4 06/07/2022 09:45 PM    MG 2.3 05/18/2022 02:19 PM     Phosphorus:   Lab Results   Component Value Date/Time    PHOS 4.4 06/15/2022 08:07 AM    PHOS 4.6 05/18/2022 02:19 PM    PHOS 4.5 04/28/2022 03:25 PM     S. Calcium:  Recent Labs     06/30/22  0429   CALCIUM 7.8*     S. Ionized Calcium:No results for input(s): IONCA in the last 72 hours. Urinalysis:   Lab Results   Component Value Date/Time    NITRU NEGATIVE 06/27/2022 06:35 PM    COLORU Dark Yellow 06/27/2022 06:35 PM    PHUR 5.5 06/27/2022 06:35 PM    WBCUA 20 TO 50 06/27/2022 06:35 PM    RBCUA 20 TO 50 06/27/2022 06:35 PM    MUCUS 1+ 06/27/2022 06:35 PM    TRICHOMONAS NOT REPORTED 02/07/2022 02:42 AM    YEAST NOT REPORTED 02/07/2022 02:42 AM    BACTERIA FEW 06/12/2022 09:33 PM    SPECGRAV 1.030 06/27/2022 06:35 PM    LEUKOCYTESUR SMALL 06/27/2022 06:35 PM    UROBILINOGEN Normal 06/27/2022 06:35 PM    BILIRUBINUR NEGATIVE 06/27/2022 06:35 PM    GLUCOSEU 1+ 06/27/2022 06:35 PM    KETUA TRACE 06/27/2022 06:35 PM    AMORPHOUS NOT REPORTED 02/07/2022 02:42 AM       CARDIAC ENZYMES: No results for input(s): CKMB, CKMBINDEX, TROPONINI in the last 72 hours. Invalid input(s): CKTOTAL;3  BNP: No results for input(s): BNP in the last 72 hours.     LFTS  Recent Labs     06/27/22  1459   ALKPHOS 162*   ALT 25   AST 38*   BILITOT <0.10*   BILIDIR <0.08   LABALBU 2.6*       AMYLASE/LIPASE/AMMONIA  No results for input(s): AMYLASE, LIPASE, AMMONIA in the last 72 hours. Last 3 Blood Glucose:   Recent Labs     06/27/22  1244 06/27/22  1908 06/28/22  0427 06/28/22  0902 06/28/22  1456 06/29/22  0335 06/30/22  0429   GLUCOSE 170* 122* 165* 68* 78 88 105*      HgBA1c:    Lab Results   Component Value Date/Time    LABA1C 4.6 03/31/2022 11:21 AM         TSH:    Lab Results   Component Value Date/Time    TSH 9.54 06/27/2022 12:44 PM     ANEMIA STUDIES  No results for input(s): LABIRON, TIBC, FERRITIN, [id number], FOLATE, OCCULTBLD in the last 72 hours. Cultures during this admission:     Blood cultures:                 [] None drawn      [x] Negative             []  Positive (Details:  )  Urine Culture:                   [] None drawn      [x] Negative             []  Positive (Details:pend  )  Sputum Culture:               [] None drawn       [] Negative             []  Positive (Details:  )   Endotracheal aspirate:     [] None drawn       [] Negative             []  Positive (Details:  )        ASSESSMENT:     Principal Problem:    Bradycardia  Active Problems:    Lymphedema    Acute kidney injury superimposed on CKD (HCC)    Hyperkalemia    Shock ([address]    Leukocytosis    Acute respiratory failure with hypoxia and hypercapnia (HCC)    Pulmonary edema cardiac cause (formerly [facility])    Acute on chronic diastolic congestive heart failure ([address]  Resolved Problems:    * No resolved hospital problems. *        PLAN:        Neurologic:   · Neuro checks per protocol  · Worsening mentatation in last two weeks before coming in per daughter. baseline status otherwise normal.   · CT of head performed in ED. Suggestive of 2.6 cm anterior right temporal meningioma with mild increase in vasogenic edema  · Previous CT's reviewed. Pending additional neurology recommendations, MRI was considered however noncritical at this time. Intubated, sedated, opens eyes to verbal commands  Cardiovascular:        Sinus Bradycardia   ?  Possibly secondary to hyperkalemia however unlikely as potassium was only 5.5. Electrolytes were otherwise unremarkable. Nephrology was consulted. See renal below. ? Cardiology consulted for evaluation of bradycardia. Recommended continuing holding Eliquis and continuing heparin. Continue aspirin and statin. Cardiology signed off. Holding heparin drip continue to bleeding from right IJ site, hemoglobin 8.1 > 7.1  ? Levophed for pressure support today. Discontinued dopamine.     History of Atrial Fibrillation - CHADS VASC score of 6  · Patient currently on Eliquis, hemodynamically stable, not in RVR.     Chronic Diastolic Heart Failure  · Recent echo showed EF of 60 to 65%.       Pulmonary:  COPD   Patient was recently started on home oxygen 2 to 3 L. Breathing treatments as needed. Possible concern community versus hospital-acquired pneumonia, appropriate antibiotics. Scattered infiltrates with bilateral effusions sent on chest x-ray completed this morning. GI/Nutrition  · Glycolax PRN  · Ulcer Prophylaxis: Not indicated  · Diet:Diet NPO      Renal/Fluid/Electrolyte  JOY on CKD Stage 4  · Creatinine 2.13 with baseline around 1.7  · Most likely pre-renal secondary to decreased perfusion due to pulmonary vascular congestion. · Continue diuresis as needed, nephrology considering dialysis, we will follow-up additional recommendations. Creatinine improved to 1.93 this morning.     ID  · Scattered infiltrates with bilateral pleural effusions present on chest x-ray this morning. Patient upon appropriate antibiotics, cefepime and Levaquin for hospital versus community-acquired pneumonia. We will continue to trend white count, manage fevers at present. White blood cell count 12.1 down from 21.4 yesterday.     Hematology:      · Recent Labs     06/27/22  1244   HGB 10.5*   · Stable. Daily CBC.      Endocrine:   ? Hx of diabetes mellitus not on any home medications  ? Most recent HbA1c 4.6 from 3/31/2022  ?  Will continue to monitor  ? Patient is currently NPO      DVT Prophylaxis  ? Heparin gtt         Jatin Bass MD,        Department of Internal Medicine/ Critical care  [facility], [place] ([place]             6/30/2022, 9:09 AM    Attending Physician Statement  I have discussed the care of [address] [place] [address] [address], including pertinent history and exam findings with the resident. I have reviewed the key elements of all parts of the encounter with the resident. I have seen and examined the patient with the resident. I agree with the assessment and plan and status of the problem list as documented. I seen the patient during around today, chart reviewed, labs and medications reviewed overnight events noted. She is currently on low-dose of Levophed. Hemoglobin globin there is a drop to 7.1 from 8.1 and currently heparin is on hold since yesterday as she had oozing from the right IJ Yohan catheter site. Since 5 in the morning it has stopped by dressing. Creatinine is 1.48 today she had hemodialysis yesterday minimal urine output. Ventilator setting PRVC/26/350/8/30 percent ABG was 7.3 2/58/144/29. There is a drop in platelet of 744 we will continue to monitor. Will get chest x-ray today. Start tube feeding. Continue cefepime WBC count is improving. Continue with ventilator support. We will try to get her off Versed drip and try to start Precedex drip so neuro assessment can be done currently she has eyes blinking on stimulation but did not follow commands. Continue to try to wean Levophed down. Continue to hold heparin    Discussed with nursing staff, treatment and plan discussed.   Discussed with respiratory therapist.    Total critical care time caring for this patient with life threatening, unstable organ failure, including direct patient contact, management of life support systems, review of data including imaging and labs, discussions with other team members and physicians at least 35 min so far today, excluding procedures. Please note that this chart was generated using voice recognition Dragon dictation software. Although every effort was made to ensure the accuracy of this automated transcription, some errors in transcription may have occurred.      Richie Nj MD  6/30/2022 12:01 PM

## 2022-06-30 NOTE — PROGRESS NOTES
in the low 90% on oxygen.     The patient was reported to be more fatigued than normal and the heart rate was found to be 35 and the patient was started on transcutaneous monitoring and atropine was given by EMS. The patient had a recent 48-hour Holter monitor showing sinus rhythm with some PACs earlier this month. The patient has required epinephrine for blood pressure support as this was switched to dopamine and Levophed which she continues currently. She is on BiPAP and currently nonconversant. She has developed acute kidney injury and will be started on hemodialysis. The chest x-ray shows findings consistent with pulmonary edema/congestion and the patient has been lethargic since admission. I was asked to evaluate for leukocytosis and bilateral lower extremity leg ulcers. The patient    Current evaluation:2022    /85   Pulse 82   Temp (!) 95.7 °F (35.4 °C) (Esophageal)   Resp 24   Ht 4' 11\" (1.499 m)   Wt 118 lb 2.7 oz (53.6 kg)   SpO2 98%   BMI 23.87 kg/m²     Temperature Range: Temp: (!) 95.7 °F (35.4 °C) Temp  Av.9 °F (35.5 °C)  Min: 95.4 °F (35.2 °C)  Max: 97 °F (36.1 °C)  The patient is seen and evaluated at bedside she is on the ventilator at 30% FiO2 and 8 of PEEP. She remains on Precedex drip for sedation. She is on Levophed for blood pressure support. She does not respond or follow commands for me. Review of Systems   Unable to perform ROS: Intubated       Physical Examination :     Physical Exam  Constitutional:       Appearance: She is well-developed. Interventions: She is sedated and intubated. HENT:      Head: Normocephalic and atraumatic. Cardiovascular:      Rate and Rhythm: Regular rhythm. Heart sounds: Murmur heard. Pulmonary:      Effort: Pulmonary effort is normal. She is intubated. Breath sounds: Normal breath sounds. Abdominal:      General: Bowel sounds are normal.      Palpations: Abdomen is soft.    Musculoskeletal: General: Swelling (Bilateral upper extremities) present. Skin:     General: Skin is warm and dry. Comments: The legs and feet are currently in wrapped in Unna boots   Neurological:      Mental Status: She is oriented to person, place, and time. Laboratory data:   I have independently reviewed the followinglabs:  CBC with Differential:   Recent Labs     06/29/22 0335 06/29/22 0335 06/29/22 2035 06/30/22 0429   WBC 12.1*  --   --  8.7   HGB 8.7*   < > 8.1* 7.1*   HCT 29.4*   < > 26.9* 24.1*     --   --  161   LYMPHOPCT 7*  --   --  7*   MONOPCT 10  --   --  8*    < > = values in this interval not displayed.      BMP:   Recent Labs     06/29/22 0335 06/30/22 0429   * 137   K 4.3 4.2    101   CO2 23 26   BUN 46* 30*   CREATININE 1.93* 1.48*     Hepatic Function Panel:   Recent Labs     06/27/22  1459   PROT 5.3*   LABALBU 2.6*   BILIDIR <0.08   IBILI Can not be calculated   BILITOT <0.10*   ALKPHOS 162*   ALT 25   AST 38*         Lab Results   Component Value Date/Time    PROCAL 0.11 06/27/2022 12:44 PM    PROCAL 0.26 03/07/2022 05:53 AM     Lab Results   Component Value Date/Time    CRP <3.0 03/01/2022 10:07 PM    CRP <3.0 02/07/2022 06:23 AM     Lab Results   Component Value Date    SEDRATE 7 10/17/2017         Lab Results   Component Value Date/Time    DDIMER 0.71 07/25/2020 11:20 AM     Lab Results   Component Value Date/Time    FERRITIN 13 03/01/2022 10:07 PM    FERRITIN 155 07/25/2020 11:20 AM     Lab Results   Component Value Date/Time     03/10/2022 11:50 AM     07/25/2020 11:20 AM     Lab Results   Component Value Date/Time    FIBRINOGEN 610 07/25/2020 11:20 AM       Results in Past 30 Days  Result Component Current Result Ref Range Previous Result Ref Range   SARS-CoV-2, Rapid Not Detected (6/27/2022) Not Detected Not Detected (6/12/2022) Not Detected     Lab Results   Component Value Date/Time    COVID19 Not Detected 06/27/2022 01:44 PM    COVID19 Not Detected 06/12/2022 06:34 PM    COVID19 Not Detected 03/01/2022 10:34 PM    COVID19 Not Detected 02/06/2022 09:46 AM    COVID19 Not Detected 07/25/2020 12:17 PM       No results for input(s): LASHA in the last 72 hours. Imaging Studies:   ONE XRAY VIEW OF THE CHEST 6/29/2022 7:19 am  Impression   Scattered infiltrates with bilateral effusions.       Support tubes as described above       Cultures:     Culture, Blood 1 [[id number]] Collected: 06/27/22 1311   Order Status: Completed Specimen: Blood Updated: 06/29/22 1327    Specimen Description . BLOOD    Special Requests NOT GIVEN    Culture NO GROWTH 2 DAYS   Culture, Blood 1 [[id number]] Collected: 06/27/22 1245   Order Status: Completed Specimen: Blood Updated: 06/29/22 1326    Specimen Description . BLOOD    Special Requests L AC 1ML    Culture NO GROWTH 2 DAYS   Culture, Urine [[id number]] Collected: 06/27/22 2007   Order Status: Completed Specimen: Urine Updated: 06/28/22 2106    Specimen Description . URINE    Culture NO GROWTH   MRSA DNA Probe, Nasal [[id number]] Collected: 06/27/22 1742   Order Status: Completed Specimen: Nasal Updated: 06/28/22 1041    Specimen Description . NASAL SWAB    MRSA, DNA, Nasal NEGATIVE    Comment: NEGATIVE:  MRSA DNA not detected by nucleic acid amplification.                                                     Results should be used as an adjunct to nosocomial control efforts to identify patients   needing enhanced precautions.     The test is not intended to identify patients with staphylococcal infections.  Results   should not be used to guide or monitor treatment for MRSA infections. COVID-19, Rapid [[id number]] Collected: 06/27/22 1344   Order Status: Completed Specimen: Nasopharyngeal Swab Updated: 06/27/22 1411    Specimen Description . NASOPHARYNGEAL SWAB    SARS-CoV-2, Rapid Not Detected    Comment:        Rapid NAAT:  The specimen is NEGATIVE for SARS-CoV-2, the novel coronavirus associated with COVID-19.         The ID NOW COVID-19 assay is designed to detect the virus that causes COVID-19 in patients   with signs and symptoms of infection who are suspected of COVID-19. An individual without symptoms of COVID-19 and who is not shedding SARS-CoV-2 virus would   expect to have a negative (not detected) result in this assay. Negative results should be treated as presumptive and, if inconsistent with clinical signs   and symptoms or necessary for patient management,   should be tested with an alternative molecular assay. Negative results do not preclude   SARS-CoV-2 infection and   should not be used as the sole basis for patient management decisions.         Fact sheet for Healthcare Providers: Lio.delilah   Fact sheet for Patients: Lio.delilah           Methodology: Isothermal Nucleic Acid Amplification               Medications:      midodrine  5 mg Per NG tube TID WC    sodium polystyrene  15 g Oral Once    cefepime  1,000 mg IntraVENous Q24H    [Held by provider] amiodarone  100 mg Oral Daily    rocuronium  0.6 mg/kg IntraVENous Once    albuterol  2.5 mg Nebulization Q6H    sodium chloride flush  5-40 mL IntraVENous 2 times per day    aspirin EC  81 mg Oral Daily    atorvastatin  40 mg Oral Daily    magnesium oxide  400 mg Oral BID    miconazole   Topical BID    ferrous sulfate  325 mg Oral Daily with breakfast           [facility]  Hollis Diamond MD  [employer] messaging  OFFICE: [phone]      Electronically signed by Hollis Diamond MD on 6/30/2022 at 12:54 PM  Thank you for allowing us to participate in the care of this patient. Please call with questions.     This note iscreated with the assistance of a speech recognition program.  While intending to generate a document that actually reflects the content of the visit, the document can still have some errors including those of syntax andsound a like substitutions which may escape proof reading. In such instances, actual meaning can be extrapolated by contextual diversion.

## 2022-06-30 NOTE — PLAN OF CARE
Problem: Discharge Planning  Goal: Discharge to home or other facility with appropriate resources  6/29/2022 2144 by Rebeca Sky RN  Outcome: Progressing  6/29/2022 1223 by Lieutenant Kristie RN  Outcome: Progressing  Flowsheets (Taken 6/29/2022 0800)  Discharge to home or other facility with appropriate resources: Identify barriers to discharge with patient and caregiver     Problem: Chronic Conditions and Co-morbidities  Goal: Patient's chronic conditions and co-morbidity symptoms are monitored and maintained or improved  6/29/2022 2144 by Rebeca Sky RN  Outcome: Progressing  6/29/2022 1223 by Lieutenant Kristie RN  Outcome: Progressing  Flowsheets (Taken 6/29/2022 0800)  Care Plan - Patient's Chronic Conditions and Co-Morbidity Symptoms are Monitored and Maintained or Improved: Monitor and assess patient's chronic conditions and comorbid symptoms for stability, deterioration, or improvement     Problem: Pain  Goal: Verbalizes/displays adequate comfort level or baseline comfort level  6/29/2022 2144 by Rebeca Sky RN  Outcome: Progressing  6/29/2022 1223 by Lieutenant Kristie RN  Outcome: Progressing     Problem: Skin/Tissue Integrity  Goal: Absence of new skin breakdown  Description: 1. Monitor for areas of redness and/or skin breakdown  2. Assess vascular access sites hourly  3. Every 4-6 hours minimum:  Change oxygen saturation probe site  4. Every 4-6 hours:  If on nasal continuous positive airway pressure, respiratory therapy assess nares and determine need for appliance change or resting period.   6/29/2022 2144 by Rebeca Sky RN  Outcome: Progressing  6/29/2022 1223 by Lieutenant Kristie RN  Outcome: Progressing     Problem: Safety - Adult  Goal: Free from fall injury  6/29/2022 2144 by Rebeca Sky RN  Outcome: Progressing  6/29/2022 1223 by Lieutenant Kristie RN  Outcome: Progressing  Flowsheets (Taken 6/29/2022 0800)  Free From Fall Injury: Instruct family/caregiver on patient safety     Problem: ABCDS Injury Assessment  Goal: Absence of physical injury  6/29/2022 2144 by Huber Aldrich RN  Outcome: Progressing  6/29/2022 1223 by Gricelda Rodriguez RN  Outcome: Progressing  Flowsheets (Taken 6/29/2022 0800)  Absence of Physical Injury: Implement safety measures based on patient assessment     Problem: Respiratory - Adult  Goal: Achieves optimal ventilation and oxygenation  6/29/2022 2144 by Huber Aldrich RN  Outcome: Progressing  6/29/2022 2012 by Ivan Cancino RCP  Outcome: Progressing  6/29/2022 1223 by Gricelda Rodriguez RN  Outcome: Progressing  6/29/2022 0910 by Bryce Maguire RCP  Outcome: Progressing     Problem: Safety - Medical Restraint  Goal: Remains free of injury from restraints (Restraint for Interference with Medical Device)  Description: INTERVENTIONS:  1. Determine that other, less restrictive measures have been tried or would not be effective before applying the restraint  2. Evaluate the patient's condition at the time of restraint application  3. Inform patient/family regarding the reason for restraint  4.  Q2H: Monitor safety, psychosocial status, comfort, nutrition and hydration  6/29/2022 2144 by Huber Aldrich RN  Outcome: Progressing  Flowsheets  Taken 6/29/2022 1600 by Gricelda Rodriguez RN  Remains free of injury from restraints (restraint for interference with medical device): Determine that other, less restrictive measures have been tried or would not be effective before applying the restraint  Taken 6/29/2022 1400 by Gricelda Rodriguez RN  Remains free of injury from restraints (restraint for interference with medical device): Determine that other, less restrictive measures have been tried or would not be effective before applying the restraint  6/29/2022 1223 by Gricelda Rodriguez RN  Outcome: Progressing  Flowsheets  Taken 6/29/2022 1200  Remains free of injury from restraints (restraint for interference with medical device): Determine that other, less restrictive measures have been tried or would not be effective before applying the restraint  Taken 6/29/2022 1000  Remains free of injury from restraints (restraint for interference with medical device):   Determine that other, less restrictive measures have been tried or would not be effective before applying the restraint   Inform patient/family regarding the reason for restraint   Every 2 hours: Monitor safety, psychosocial status, comfort, nutrition and hydration  Taken 6/29/2022 0800  Remains free of injury from restraints (restraint for interference with medical device):   Determine that other, less restrictive measures have been tried or would not be effective before applying the restraint   Inform patient/family regarding the reason for restraint   Every 2 hours: Monitor safety, psychosocial status, comfort, nutrition and hydration     Problem: Nutrition Deficit:  Goal: Optimize nutritional status  Outcome: Progressing

## 2022-06-30 NOTE — CARE COORDINATION
06/30/22 1101   Readmission Assessment   Number of Days since last admission? 8-30 days   Previous Disposition Home with Family  (OP wound care at [facility], they referred her to [facility])   Who is being Charlann Spearing   What was the patient's/caregiver's perception as to why they think they needed to return back to the hospital?   (pt was in distress- EMS called)   Did you visit your Primary Care Physician after you left the hospital, before you returned this time? Yes  (Virtual visit)   Did you see a specialist, such as Cardiac, Pulmonary, Orthopedic Physician, etc. after you left the hospital? Yes  ([address])   Who advised the patient to return to the hospital? Other (Comment)  (EMS)   Does the patient report anything that got in the way of taking their medications? No   In our efforts to provide the best possible care to you and others like you, can you think of anything that we could have done to help you after you left the hospital the first time, so that you might not have needed to return so soon?  Other (Comment)  (Pt's daughter stated discharge instruction were explained well)

## 2022-07-01 LAB
ABO/RH: NORMAL
ABSOLUTE EOS #: 0.16 K/UL (ref 0–0.44)
ABSOLUTE IMMATURE GRANULOCYTE: 0.08 K/UL (ref 0–0.3)
ABSOLUTE LYMPH #: 0.55 K/UL (ref 1.1–3.7)
ABSOLUTE MONO #: 0.78 K/UL (ref 0.1–1.2)
ALLEN TEST: POSITIVE
ANION GAP SERPL CALCULATED.3IONS-SCNC: 7 MMOL/L (ref 9–17)
ANTIBODY SCREEN: NEGATIVE
ARM BAND NUMBER: NORMAL
BASOPHILS # BLD: 0 % (ref 0–2)
BASOPHILS ABSOLUTE: 0 K/UL (ref 0–0.2)
BLD PROD TYP BPU: NORMAL
BLOOD BANK BLOOD PRODUCT EXPIRATION DATE: NORMAL
BLOOD BANK ISBT PRODUCT BLOOD TYPE: 600
BLOOD BANK PRODUCT CODE: NORMAL
BLOOD BANK UNIT TYPE AND RH: NORMAL
BPU ID: NORMAL
BUN BLDV-MCNC: 42 MG/DL (ref 8–23)
CALCIUM SERPL-MCNC: 7.7 MG/DL (ref 8.6–10.4)
CHLORIDE BLD-SCNC: 102 MMOL/L (ref 98–107)
CO2: 27 MMOL/L (ref 20–31)
CREAT SERPL-MCNC: 1.93 MG/DL (ref 0.5–0.9)
CROSSMATCH RESULT: NORMAL
DISPENSE STATUS BLOOD BANK: NORMAL
EOSINOPHILS RELATIVE PERCENT: 2 % (ref 1–4)
EXPIRATION DATE: NORMAL
FIO2: 30
GFR AFRICAN AMERICAN: 31 ML/MIN
GFR NON-AFRICAN AMERICAN: 26 ML/MIN
GFR SERPL CREATININE-BSD FRML MDRD: ABNORMAL ML/MIN/{1.73_M2}
GLUCOSE BLD-MCNC: 103 MG/DL (ref 70–99)
GLUCOSE BLD-MCNC: 103 MG/DL (ref 74–100)
HCT VFR BLD CALC: 27.2 % (ref 36.3–47.1)
HCT VFR BLD CALC: 27.3 % (ref 36.3–47.1)
HEMOGLOBIN: 8.1 G/DL (ref 11.9–15.1)
HEMOGLOBIN: 8.2 G/DL (ref 11.9–15.1)
IMMATURE GRANULOCYTES: 1 %
LYMPHOCYTES # BLD: 7 % (ref 24–43)
MCH RBC QN AUTO: 30.2 PG (ref 25.2–33.5)
MCHC RBC AUTO-ENTMCNC: 29.7 G/DL (ref 28.4–34.8)
MCV RBC AUTO: 101.9 FL (ref 82.6–102.9)
MONOCYTES # BLD: 10 % (ref 3–12)
MORPHOLOGY: ABNORMAL
NRBC AUTOMATED: 0 PER 100 WBC
O2 DEVICE/FLOW/%: ABNORMAL
PDW BLD-RTO: 16.7 % (ref 11.8–14.4)
PLATELET # BLD: 150 K/UL (ref 138–453)
PMV BLD AUTO: 10.7 FL (ref 8.1–13.5)
POC HCO3: 28.4 MMOL/L (ref 21–28)
POC O2 SATURATION: 96 % (ref 94–98)
POC PCO2: 51.1 MM HG (ref 35–48)
POC PH: 7.35 (ref 7.35–7.45)
POC PO2: 90.9 MM HG (ref 83–108)
POSITIVE BASE EXCESS, ART: 2 (ref 0–3)
POTASSIUM SERPL-SCNC: 5 MMOL/L (ref 3.7–5.3)
RBC # BLD: 2.68 M/UL (ref 3.95–5.11)
SAMPLE SITE: ABNORMAL
SEG NEUTROPHILS: 80 % (ref 36–65)
SEGMENTED NEUTROPHILS ABSOLUTE COUNT: 6.23 K/UL (ref 1.5–8.1)
SODIUM BLD-SCNC: 136 MMOL/L (ref 135–144)
TRANSFUSION STATUS: NORMAL
UNIT DIVISION: 0
UNIT ISSUE DATE/TIME: NORMAL
WBC # BLD: 7.8 K/UL (ref 3.5–11.3)

## 2022-07-01 PROCEDURE — 6360000002 HC RX W HCPCS: Performed by: INTERNAL MEDICINE

## 2022-07-01 PROCEDURE — 94640 AIRWAY INHALATION TREATMENT: CPT

## 2022-07-01 PROCEDURE — 6370000000 HC RX 637 (ALT 250 FOR IP): Performed by: STUDENT IN AN ORGANIZED HEALTH CARE EDUCATION/TRAINING PROGRAM

## 2022-07-01 PROCEDURE — 2700000000 HC OXYGEN THERAPY PER DAY

## 2022-07-01 PROCEDURE — 85018 HEMOGLOBIN: CPT

## 2022-07-01 PROCEDURE — 99232 SBSQ HOSP IP/OBS MODERATE 35: CPT | Performed by: INTERNAL MEDICINE

## 2022-07-01 PROCEDURE — 85025 COMPLETE CBC W/AUTO DIFF WBC: CPT

## 2022-07-01 PROCEDURE — 82803 BLOOD GASES ANY COMBINATION: CPT

## 2022-07-01 PROCEDURE — 2580000003 HC RX 258: Performed by: STUDENT IN AN ORGANIZED HEALTH CARE EDUCATION/TRAINING PROGRAM

## 2022-07-01 PROCEDURE — 2000000000 HC ICU R&B

## 2022-07-01 PROCEDURE — 99291 CRITICAL CARE FIRST HOUR: CPT | Performed by: INTERNAL MEDICINE

## 2022-07-01 PROCEDURE — 6360000002 HC RX W HCPCS: Performed by: STUDENT IN AN ORGANIZED HEALTH CARE EDUCATION/TRAINING PROGRAM

## 2022-07-01 PROCEDURE — 51798 US URINE CAPACITY MEASURE: CPT

## 2022-07-01 PROCEDURE — 94761 N-INVAS EAR/PLS OXIMETRY MLT: CPT

## 2022-07-01 PROCEDURE — 94003 VENT MGMT INPAT SUBQ DAY: CPT

## 2022-07-01 PROCEDURE — 80048 BASIC METABOLIC PNL TOTAL CA: CPT

## 2022-07-01 PROCEDURE — 90935 HEMODIALYSIS ONE EVALUATION: CPT

## 2022-07-01 PROCEDURE — 2500000003 HC RX 250 WO HCPCS: Performed by: STUDENT IN AN ORGANIZED HEALTH CARE EDUCATION/TRAINING PROGRAM

## 2022-07-01 PROCEDURE — 85014 HEMATOCRIT: CPT

## 2022-07-01 PROCEDURE — 82947 ASSAY GLUCOSE BLOOD QUANT: CPT

## 2022-07-01 PROCEDURE — 36600 WITHDRAWAL OF ARTERIAL BLOOD: CPT

## 2022-07-01 PROCEDURE — 36415 COLL VENOUS BLD VENIPUNCTURE: CPT

## 2022-07-01 RX ORDER — SODIUM CHLORIDE 0.9 % (FLUSH) 0.9 %
5-40 SYRINGE (ML) INJECTION PRN
Status: DISCONTINUED | OUTPATIENT
Start: 2022-07-01 | End: 2022-07-18 | Stop reason: HOSPADM

## 2022-07-01 RX ORDER — SODIUM CHLORIDE 0.9 % (FLUSH) 0.9 %
5-40 SYRINGE (ML) INJECTION EVERY 12 HOURS SCHEDULED
Status: DISCONTINUED | OUTPATIENT
Start: 2022-07-01 | End: 2022-07-12

## 2022-07-01 RX ORDER — LIDOCAINE HYDROCHLORIDE 10 MG/ML
5 INJECTION, SOLUTION INFILTRATION; PERINEURAL ONCE
Status: DISCONTINUED | OUTPATIENT
Start: 2022-07-01 | End: 2022-07-10

## 2022-07-01 RX ORDER — SODIUM CHLORIDE 9 MG/ML
25 INJECTION, SOLUTION INTRAVENOUS PRN
Status: DISCONTINUED | OUTPATIENT
Start: 2022-07-01 | End: 2022-07-18 | Stop reason: HOSPADM

## 2022-07-01 RX ADMIN — DEXMEDETOMIDINE HYDROCHLORIDE 0.7 MCG/KG/HR: 4 INJECTION, SOLUTION INTRAVENOUS at 01:33

## 2022-07-01 RX ADMIN — DEXMEDETOMIDINE HYDROCHLORIDE 0.9 MCG/KG/HR: 4 INJECTION, SOLUTION INTRAVENOUS at 22:45

## 2022-07-01 RX ADMIN — MIDODRINE HYDROCHLORIDE 5 MG: 5 TABLET ORAL at 08:08

## 2022-07-01 RX ADMIN — MORPHINE SULFATE 2 MG: 2 INJECTION, SOLUTION INTRAMUSCULAR; INTRAVENOUS at 00:56

## 2022-07-01 RX ADMIN — MAGNESIUM GLUCONATE 500 MG ORAL TABLET 400 MG: 500 TABLET ORAL at 20:54

## 2022-07-01 RX ADMIN — FENTANYL CITRATE 50 MCG: 50 INJECTION, SOLUTION INTRAMUSCULAR; INTRAVENOUS at 12:41

## 2022-07-01 RX ADMIN — ALBUTEROL SULFATE 2.5 MG: 2.5 SOLUTION RESPIRATORY (INHALATION) at 15:09

## 2022-07-01 RX ADMIN — ALBUTEROL SULFATE 2.5 MG: 2.5 SOLUTION RESPIRATORY (INHALATION) at 03:45

## 2022-07-01 RX ADMIN — FENTANYL CITRATE 50 MCG: 50 INJECTION, SOLUTION INTRAMUSCULAR; INTRAVENOUS at 05:14

## 2022-07-01 RX ADMIN — HEPARIN SODIUM 1200 UNITS: 1000 INJECTION INTRAVENOUS; SUBCUTANEOUS at 19:00

## 2022-07-01 RX ADMIN — ALBUTEROL SULFATE 2.5 MG: 2.5 SOLUTION RESPIRATORY (INHALATION) at 08:07

## 2022-07-01 RX ADMIN — ANTI-FUNGAL POWDER MICONAZOLE NITRATE TALC FREE: 1.42 POWDER TOPICAL at 08:09

## 2022-07-01 RX ADMIN — CEFEPIME HYDROCHLORIDE 1000 MG: 1 INJECTION, POWDER, FOR SOLUTION INTRAMUSCULAR; INTRAVENOUS at 10:30

## 2022-07-01 RX ADMIN — MIDODRINE HYDROCHLORIDE 5 MG: 5 TABLET ORAL at 12:09

## 2022-07-01 RX ADMIN — Medication 81 MG: at 08:08

## 2022-07-01 RX ADMIN — FERROUS SULFATE TAB EC 325 MG (65 MG FE EQUIVALENT) 325 MG: 325 (65 FE) TABLET DELAYED RESPONSE at 08:08

## 2022-07-01 RX ADMIN — MIDODRINE HYDROCHLORIDE 5 MG: 5 TABLET ORAL at 17:13

## 2022-07-01 RX ADMIN — ANTI-FUNGAL POWDER MICONAZOLE NITRATE TALC FREE: 1.42 POWDER TOPICAL at 20:54

## 2022-07-01 RX ADMIN — HEPARIN SODIUM 1300 UNITS: 1000 INJECTION INTRAVENOUS; SUBCUTANEOUS at 19:00

## 2022-07-01 RX ADMIN — MORPHINE SULFATE 2 MG: 2 INJECTION, SOLUTION INTRAMUSCULAR; INTRAVENOUS at 19:34

## 2022-07-01 RX ADMIN — ATORVASTATIN CALCIUM 40 MG: 80 TABLET, FILM COATED ORAL at 08:08

## 2022-07-01 RX ADMIN — SODIUM CHLORIDE: 9 INJECTION, SOLUTION INTRAVENOUS at 04:55

## 2022-07-01 RX ADMIN — SODIUM CHLORIDE, PRESERVATIVE FREE 10 ML: 5 INJECTION INTRAVENOUS at 08:09

## 2022-07-01 RX ADMIN — DEXMEDETOMIDINE HYDROCHLORIDE 0.7 MCG/KG/HR: 4 INJECTION, SOLUTION INTRAVENOUS at 12:24

## 2022-07-01 RX ADMIN — MAGNESIUM GLUCONATE 500 MG ORAL TABLET 400 MG: 500 TABLET ORAL at 08:09

## 2022-07-01 ASSESSMENT — PULMONARY FUNCTION TESTS
PIF_VALUE: 21
PIF_VALUE: 19
PIF_VALUE: 20
PIF_VALUE: 16
PIF_VALUE: 22
PIF_VALUE: 17
PIF_VALUE: 20
PIF_VALUE: 16
PIF_VALUE: 22
PIF_VALUE: 19
PIF_VALUE: 21
PIF_VALUE: 20
PIF_VALUE: 16
PIF_VALUE: 16
PIF_VALUE: 18
PIF_VALUE: 20
PIF_VALUE: 20
PIF_VALUE: 51
PIF_VALUE: 22
PIF_VALUE: 21
PIF_VALUE: 51
PIF_VALUE: 18
PIF_VALUE: 20
PIF_VALUE: 20
PIF_VALUE: 21
PIF_VALUE: 20

## 2022-07-01 NOTE — PROGRESS NOTES
Education/Counseling: No recommendation at this time  Coordination of Nutrition Care: Continue to monitor while inpatient       Goals:  Previous Goal Met: Goal(s) Achieved  Goals: Meet at least 75% of estimated needs,within 7 days       Nutrition Monitoring and Evaluation:   Behavioral-Environmental Outcomes: None Identified  Food/Nutrient Intake Outcomes: Enteral Nutrition Intake/Tolerance  Physical Signs/Symptoms Outcomes: Biochemical Data,Fluid Status or Edema,Nutrition Focused Physical Findings,Skin,Weight,Constipation    Discharge Planning:     Too soon to determine     [address], LD  Contact: [phone]

## 2022-07-01 NOTE — PROGRESS NOTES
Dialysis Post Treatment Note  Vitals:    07/01/22 1905   BP: (!) 137/40   Pulse: 61   Resp: 20   Temp: 96.8 °F (36 °C)   SpO2: 100%     Pre-Weight =  71.8 kg  Post-weight = Weight: 151 lb 10.8 oz (68.8 kg)  Total Liters Processed = Total Liters Processed (l/min): 80.1 l/min  Rinseback Volume (mL) = Rinseback Volume (ml): 300 ml  Net Removal (mL) =  3000  Patient's dry weight= not established  Type of access used= Temp Cath  Length of treatment=3.5 hours    Pt tolerated HD tx, Midodrine given by Unit RN, post tx relatives x 2 remained in pt's room, Unit RNs updated

## 2022-07-01 NOTE — CARE COORDINATION
Referral was made to [facility] for outpatient dialysis and was accepted. Pt will likely need LTAC first.    SW following.

## 2022-07-01 NOTE — PROGRESS NOTES
Dialysis Time Out  To be done by RN and tech or 2 RNs Staff Names Rosenda Record., Hemo RN / Koko Chamberlain, ICU RN    [x]  Identity of the patient using 2 patient identifiers  [x]  Consent for treatment  [x]  Equipment-proper machine and dialyzer  [x]  B-Hep B status  [x]  Orders- to include bath, blood flow, dialyzer, time and fluid removal  [x]  Access-Correct site and in working order  [x]  Time for patient to ask questions.

## 2022-07-01 NOTE — PROGRESS NOTES
Nephrology Progress Note        Subjective: Patient is seen and evaluated. She will require dialysis today. She had her third dialysis yesterday. . Access cannulated without problems. No new issues overnite. Stable hemodynamics. She is on a minimal amount of Levophed. She remains intubated and sedated and on the ventilator. Her bradycardia has resolved. Her hyperkalemia has improved with dialysis. She has underlying diabetic nephropathy with heavy, nephrotic range, proteinuria. The patient's dialysis treatment yesterday was isolated ultrafiltration only for fluid removal.  Temporary dialysis catheter remains in place. Eventually, the goal would be to likely place a tunneled dialysis catheter next week. My dynamics are stable.         Objective:  CURRENT TEMPERATURE:  Temp: 97.2 °F (36.2 °C)  MAXIMUM TEMPERATURE OVER 24HRS:  Temp (24hrs), Av.5 °F (36.4 °C), Min:95.9 °F (35.5 °C), Max:99.1 °F (37.3 °C)    CURRENT RESPIRATORY RATE:  Resp: 21  CURRENT PULSE:  Heart Rate: 65  CURRENT BLOOD PRESSURE:  BP: (!) 138/46  24HR BLOOD PRESSURE RANGE:  Systolic (20JQD), PQB:809 , Min:92 , HUD:536   ; Diastolic (04LEW), ZKF:48, Min:35, Max:85    24HR INTAKE/OUTPUT:      Intake/Output Summary (Last 24 hours) at 2022 0950  Last data filed at 2022 0800  Gross per 24 hour   Intake 1731.66 ml   Output 2310 ml   Net -578.34 ml     Patient Vitals for the past 96 hrs (Last 3 readings):   Weight   22 1845 155 lb 3.3 oz (70.4 kg)   22 1600 159 lb 13.3 oz (72.5 kg)   22 1845 118 lb 2.7 oz (53.6 kg)         Physical Exam:  General appearance: Sedated and on the ventilator   Skin: warm and dry, no rash or erythema  Eyes: pale conjunctiva and sclera anicteric  ENT: Moist mucous membranes, oral endotracheal tube in place  Neck:  No JVD, No Thyromegaly  Pulmonary: Bilateral air entry  Cardiovascular: normal S1 and S2. NO rubs   Abdomen: soft, not significantly distended,  no ascites  Extremities: 2+ peripheral edema    Access:  Temporary dialysis catheter    Current Medications:    dexmedetomidine (PRECEDEX) 400 mcg in sodium chloride 0.9 % 100 mL infusion, Continuous  midodrine (PROAMATINE) tablet 5 mg, TID WC  0.9 % sodium chloride infusion, PRN  DOPamine (INTROPIN) 400 mg in dextrose 5 % 250 mL infusion, Continuous  norepinephrine (LEVOPHED) 16 mg in sodium chloride 0.9 % 250 mL infusion, Continuous  sodium polystyrene (KAYEXALATE) 15 GM/60ML suspension 15 g, Once  cefepime (MAXIPIME) 1,000 mg in sterile water 10 mL IV syringe, Q24H  [Held by provider] amiodarone (CORDARONE) tablet 100 mg, Daily  rocuronium (ZEMURON) injection 41 mg, Once  midazolam (VERSED) 1 mg/mL in D5W infusion, Continuous  fentaNYL (SUBLIMAZE) injection 50 mcg, Q2H PRN  albuterol (PROVENTIL) nebulizer solution 2.5 mg, Q6H  heparin (porcine) injection 1,300 Units, PRN  heparin (porcine) injection 1,200 Units, PRN  glucose chewable tablet 16 g, PRN  dextrose bolus 10% 125 mL, PRN   Or  dextrose bolus 10% 250 mL, PRN  glucagon (rDNA) injection 1 mg, PRN  dextrose 5 % solution, PRN  sodium chloride flush 0.9 % injection 5-40 mL, 2 times per day  sodium chloride flush 0.9 % injection 5-40 mL, PRN  0.9 % sodium chloride infusion, PRN  ondansetron (ZOFRAN-ODT) disintegrating tablet 4 mg, Q8H PRN   Or  ondansetron (ZOFRAN) injection 4 mg, Q6H PRN  polyethylene glycol (GLYCOLAX) packet 17 g, Daily PRN  acetaminophen (TYLENOL) tablet 650 mg, Q6H PRN   Or  acetaminophen (TYLENOL) suppository 650 mg, Q6H PRN  heparin (porcine) injection 4,000 Units, PRN  heparin (porcine) injection 2,000 Units, PRN  [Held by provider] heparin 25,000 units in dextrose 5 % 250 mL infusion (rate based), Continuous  aspirin EC tablet 81 mg, Daily  atorvastatin (LIPITOR) tablet 40 mg, Daily  magnesium oxide (MAG-OX) tablet 400 mg, BID  miconazole (MICOTIN) 2 % powder, BID  albuterol sulfate HFA (PROVENTIL;VENTOLIN;PROAIR) 108 (90 Base) MCG/ACT inhaler 2 puff, Q6H PRN  ferrous sulfate (FE TABS 325) EC tablet 325 mg, Daily with breakfast  morphine (PF) injection 2 mg, Q4H PRN      Labs:   CBC:   Recent Labs     06/29/22 0335 06/29/22 2035 06/30/22 0429 06/30/22 0429 06/30/22  1249 06/30/22  2241 07/01/22  0546   WBC 12.1*  --  8.7  --   --   --  7.8   RBC 2.88*  --  2.35*  --   --   --  2.68*   HGB 8.7*   < > 7.1*   < > 6.9* 8.0* 8.1*   HCT 29.4*   < > 24.1*   < > 22.2* 25.8* 27.3*     --  161  --   --   --  150   MPV 9.8  --  9.8  --   --   --  10.7    < > = values in this interval not displayed. BMP:   Recent Labs     06/29/22 0335 06/30/22 0429 07/01/22  0546   * 137 136   K 4.3 4.2 5.0    101 102   CO2 23 26 27   BUN 46* 30* 42*   CREATININE 1.93* 1.48* 1.93*   GLUCOSE 88 105* 103*   CALCIUM 7.9* 7.8* 7.7*        Phosphorus:  No results for input(s): PHOS in the last 72 hours. Magnesium: No results for input(s): MG in the last 72 hours. Albumin: No results for input(s): LABALBU in the last 72 hours. Dialysis bath: Dialysis Bath  K+ (Potassium): 3  Ca+ (Calcium): 3  Na+ (Sodium): 138  HCO3 (Bicarb): 35    Radiology:  Reviewed as available. Assessment:  1 Apparent end-stage renal disease likely secondary to progression of underlying diabetic nephropathy with the patient now anuric  2. 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 Total body volume overload, improving with dialysis  5. Anemia of chronic disease   6. Non-healing lower extremity ulcers    Plan:  1. Weight removal and dialysis orders reviewed. 2.  Evaluate daily the need for dialysis, although the patient likely will require it again tomorrow, 7/2/2022 for clearance, electrolyte balance and fluid removal  3. Plan is for tunneled hemodialysis catheter placement next week  4. Follow up labs ordered. Please do not hesitate to call with questions.     Electronically signed by Lore Vicente MD on 7/1/2022 at 9:50 AM

## 2022-07-01 NOTE — PROGRESS NOTES
Remains in [facility] throughout the night. No vent change was warranted.  Will continue to monitor

## 2022-07-01 NOTE — PLAN OF CARE
Problem: Discharge Planning  Goal: Discharge to home or other facility with appropriate resources  Outcome: Progressing  Flowsheets (Taken 7/1/2022 0800)  Discharge to home or other facility with appropriate resources: Identify barriers to discharge with patient and caregiver     Problem: Pain  Goal: Verbalizes/displays adequate comfort level or baseline comfort level  Outcome: Progressing     Problem: Skin/Tissue Integrity  Goal: Absence of new skin breakdown  Description: 1. Monitor for areas of redness and/or skin breakdown  2. Assess vascular access sites hourly  3. Every 4-6 hours minimum:  Change oxygen saturation probe site  4. Every 4-6 hours:  If on nasal continuous positive airway pressure, respiratory therapy assess nares and determine need for appliance change or resting period. Outcome: Progressing     Problem: Safety - Adult  Goal: Free from fall injury  Outcome: Progressing     Problem: ABCDS Injury Assessment  Goal: Absence of physical injury  Outcome: Progressing  Flowsheets (Taken 7/1/2022 0800)  Absence of Physical Injury: Implement safety measures based on patient assessment     Problem: Safety - Medical Restraint  Goal: Remains free of injury from restraints (Restraint for Interference with Medical Device)  Description: INTERVENTIONS:  1. Determine that other, less restrictive measures have been tried or would not be effective before applying the restraint  2. Evaluate the patient's condition at the time of restraint application  3. Inform patient/family regarding the reason for restraint  4.  Q2H: Monitor safety, psychosocial status, comfort, nutrition and hydration  Outcome: Progressing  Flowsheets  Taken 7/1/2022 1200  Remains free of injury from restraints (restraint for interference with medical device): Determine that other, less restrictive measures have been tried or would not be effective before applying the restraint  Taken 7/1/2022 1000  Remains free of injury from restraints (restraint for interference with medical device): Determine that other, less restrictive measures have been tried or would not be effective before applying the restraint  Taken 7/1/2022 0800  Remains free of injury from restraints (restraint for interference with medical device):   Determine that other, less restrictive measures have been tried or would not be effective before applying the restraint   Evaluate the patient's condition at the time of restraint application   Every 2 hours: Monitor safety, psychosocial status, comfort, nutrition and hydration

## 2022-07-01 NOTE — PROGRESS NOTES
[facility]  Progress Note    Loi Benson  MRN: [id number]  Date: 7/1/2022  LOS: 4     Reason for F/U :   Concern for septic shock    Impression :   1. Severe bradycardia-resolved  · requiring dopamine and Levophed for blood pressure support  · Able to wean off the dopamine 6/29/22  2. Acute respiratory insufficiency-ventilator dependent intubated 6/28/2022  3. Pulmonary edema  4. Acute kidney injury on chronic kidney disease  · Started on hemodialysis 6/28/2022  5. Leukocytosis-unclear etiology at this time  6. Shock-on Levophed  7. Multiple/bilateral lower extremity ulcerations-superficial not acutely infected  8. Coccygeal ulceration/right gluteal ulceration-clean    Recommendations:   · The patient's culture data remains negative. · I will discontinue the antimicrobial therapy at this point in time given no evidence of pneumonia does have bilateral pleural effusions. · She is tolerating CPAP on the ventilator. · She does remain on pressors for blood pressure support    Infection Control Recommendations:   Universal precautions    Discharge Planning:   Estimated Length of IV antimicrobials: To be determined  Patient will need Midline Catheter Insertion/ PICC line Insertion: No  Patient will need: Home IV , [place],  SNF,  LTAC: Undetermined  Patient willneed outpatient wound care: No    Medical Decision making / Summary of Stay:   Loi Benson is a 66y.o.-year-old female who was initially admitted on 6/27/2022.    Wood Hussein has a history of diabetes mellitus type 2 with associated chronic kidney disease stage IV, hyperlipidemia, coronary artery disease, hypertension, asthma, previous CVA, acute on chronic combined systolic and diastolic heart failure, chronic left lower extremity wound for which she follows in the wound care center, meningioma     The patient was short of breath at home O2 sats in the low 90% on oxygen.     The patient was reported to be more fatigued than normal and the heart rate was found to be 35 and the patient was started on transcutaneous monitoring and atropine was given by EMS. The patient had a recent 48-hour Holter monitor showing sinus rhythm with some PACs earlier this month. The patient has required epinephrine for blood pressure support as this was switched to dopamine and Levophed which she continues currently. She is on BiPAP and currently nonconversant. She has developed acute kidney injury and will be started on hemodialysis. The chest x-ray shows findings consistent with pulmonary edema/congestion and the patient has been lethargic since admission. I was asked to evaluate for leukocytosis and bilateral lower extremity leg ulcers. The patient    Current evaluation:2022    BP (!) 133/48   Pulse 69   Temp 97.2 °F (36.2 °C) (Esophageal)   Resp 20   Ht 4' 11\" (1.499 m)   Wt 155 lb 3.3 oz (70.4 kg)   SpO2 97%   BMI 31.35 kg/m²     Temperature Range: Temp: 97.2 °F (36.2 °C) Temp  Av.5 °F (36.4 °C)  Min: 95.9 °F (35.5 °C)  Max: 99.1 °F (37.3 °C)  The patient is seen and evaluated at bedside she is on the ventilator at 30% FiO2 and 8 of PEEP. She remains on Precedex drip for sedation. She is on Levophed for blood pressure support. The patient is about to start hemodialysis. The daughter is at bedside and the care was discussed with her. Review of Systems   Unable to perform ROS: Intubated       Physical Examination :     Physical Exam  Constitutional:       Appearance: She is well-developed. Interventions: She is sedated and intubated. HENT:      Head: Normocephalic and atraumatic. Cardiovascular:      Rate and Rhythm: Regular rhythm. Heart sounds: Murmur heard. Pulmonary:      Effort: Pulmonary effort is normal. She is intubated. Breath sounds: Normal breath sounds. Abdominal:      General: Bowel sounds are normal.      Palpations: Abdomen is soft.    Musculoskeletal:         General: Swelling (Bilateral upper extremities) present. Skin:     General: Skin is warm and dry. Comments: The legs and feet are currently in wrapped in Unna boots   Neurological:      Mental Status: She is oriented to person, place, and time. Laboratory data:   I have independently reviewed the followinglabs:  CBC with Differential:   Recent Labs     06/30/22  0429 06/30/22  1249 06/30/22  2241 07/01/22  0546   WBC 8.7  --   --  7.8   HGB 7.1*   < > 8.0* 8.1*   HCT 24.1*   < > 25.8* 27.3*     --   --  150   LYMPHOPCT 7*  --   --  7*   MONOPCT 8*  --   --  10    < > = values in this interval not displayed. BMP:   Recent Labs     06/30/22 0429 07/01/22  0546    136   K 4.2 5.0    102   CO2 26 27   BUN 30* 42*   CREATININE 1.48* 1.93*     Hepatic Function Panel:   No results for input(s): PROT, LABALBU, BILIDIR, IBILI, BILITOT, ALKPHOS, ALT, AST in the last 72 hours.       Lab Results   Component Value Date/Time    PROCAL 0.11 06/27/2022 12:44 PM    PROCAL 0.26 03/07/2022 05:53 AM     Lab Results   Component Value Date/Time    CRP <3.0 03/01/2022 10:07 PM    CRP <3.0 02/07/2022 06:23 AM     Lab Results   Component Value Date    SEDRATE 7 10/17/2017         Lab Results   Component Value Date/Time    DDIMER 0.71 07/25/2020 11:20 AM     Lab Results   Component Value Date/Time    FERRITIN 13 03/01/2022 10:07 PM    FERRITIN 155 07/25/2020 11:20 AM     Lab Results   Component Value Date/Time     03/10/2022 11:50 AM     07/25/2020 11:20 AM     Lab Results   Component Value Date/Time    FIBRINOGEN 610 07/25/2020 11:20 AM       Results in Past 30 Days  Result Component Current Result Ref Range Previous Result Ref Range   SARS-CoV-2, Rapid Not Detected (6/27/2022) Not Detected Not Detected (6/12/2022) Not Detected     Lab Results   Component Value Date/Time    COVID19 Not Detected 06/27/2022 01:44 PM    COVID19 Not Detected 06/12/2022 06:34 PM    COVID19 Not Detected 03/01/2022 10:34 PM COVID19 Not Detected 02/06/2022 09:46 AM    COVID19 Not Detected 07/25/2020 12:17 PM       No results for input(s): LASHA in the last 72 hours. Imaging Studies:   ONE XRAY VIEW OF THE CHEST 6/29/2022 7:19 am    Impression   Scattered infiltrates with bilateral effusions.       Support tubes as described above       Cultures:     Culture, Blood 1 [[id number]] Collected: 06/27/22 1245   Order Status: Completed Specimen: Blood Updated: 07/01/22 8705    Specimen Description . BLOOD    Special Requests L AC 1ML    Culture NO GROWTH 4 DAYS   Culture, Blood 1 [[id number]] Collected: 06/27/22 1311   Order Status: Completed Specimen: Blood Updated: 07/01/22 7391    Specimen Description . BLOOD    Special Requests NOT GIVEN    Culture NO GROWTH 4 DAYS   Culture, Urine [[id number]] Collected: 06/27/22 2007   Order Status: Completed Specimen: Urine Updated: 06/28/22 2106    Specimen Description . URINE    Culture NO GROWTH   MRSA DNA Probe, Nasal [[id number]] Collected: 06/27/22 1742   Order Status: Completed Specimen: Nasal Updated: 06/28/22 1041    Specimen Description . NASAL SWAB    MRSA, DNA, Nasal NEGATIVE    Comment: NEGATIVE:  MRSA DNA not detected by nucleic acid amplification.                                                     Results should be used as an adjunct to nosocomial control efforts to identify patients   needing enhanced precautions.     The test is not intended to identify patients with staphylococcal infections.  Results   should not be used to guide or monitor treatment for MRSA infections. MRSA DNA Probe, Nasal [[id number]]    Order Status: Canceled Specimen: Nares    COVID-19, Rapid [[id number]] Collected: 06/27/22 1344   Order Status: Completed Specimen: Nasopharyngeal Swab Updated: 06/27/22 1411    Specimen Description . NASOPHARYNGEAL SWAB    SARS-CoV-2, Rapid Not Detected    Comment:        Rapid NAAT:  The specimen is NEGATIVE for SARS-CoV-2, the novel coronavirus associated with COVID-19.         The ID NOW COVID-19 assay is designed to detect the virus that causes COVID-19 in patients   with signs and symptoms of infection who are suspected of COVID-19. An individual without symptoms of COVID-19 and who is not shedding SARS-CoV-2 virus would   expect to have a negative (not detected) result in this assay. Negative results should be treated as presumptive and, if inconsistent with clinical signs   and symptoms or necessary for patient management,   should be tested with an alternative molecular assay. Negative results do not preclude   SARS-CoV-2 infection and   should not be used as the sole basis for patient management decisions.         Fact sheet for Healthcare Providers: Lio.es   Fact sheet for Patients: Lio.delilah           Methodology: Isothermal Nucleic Acid Amplification         Medications:      lidocaine 1 % injection  5 mL IntraDERmal Once    sodium chloride flush  5-40 mL IntraVENous 2 times per day    midodrine  5 mg Per NG tube TID WC    sodium polystyrene  15 g Oral Once    cefepime  1,000 mg IntraVENous Q24H    [Held by provider] amiodarone  100 mg Oral Daily    rocuronium  0.6 mg/kg IntraVENous Once    albuterol  2.5 mg Nebulization Q6H    sodium chloride flush  5-40 mL IntraVENous 2 times per day    aspirin EC  81 mg Oral Daily    atorvastatin  40 mg Oral Daily    magnesium oxide  400 mg Oral BID    miconazole   Topical BID    ferrous sulfate  325 mg Oral Daily with breakfast           [facility]  Jennifer Maddox MD  [employer] messaging  OFFICE: [phone]      Electronically signed by Jennifer Maddox MD on 7/1/2022 at 2:03 PM  Thank you for allowing us to participate in the care of this patient. Please call with questions.     This note iscreated with the assistance of a speech recognition program.  While intending to generate a document that actually reflects the content of the visit, the document can still have some errors including those of syntax andsound a like substitutions which may escape proof reading. In such instances, actual meaning can be extrapolated by contextual diversion.

## 2022-07-01 NOTE — PLAN OF CARE
Problem: Discharge Planning  Goal: Discharge to home or other facility with appropriate resources  7/1/2022 0149 by Geovany Wilks RN  Outcome: Progressing  6/30/2022 1727 by Juan F Etienne RN  Outcome: Progressing     Problem: Chronic Conditions and Co-morbidities  Goal: Patient's chronic conditions and co-morbidity symptoms are monitored and maintained or improved  7/1/2022 0149 by Geovany Wilks RN  Outcome: Progressing  6/30/2022 1727 by Juan F Etienne RN  Outcome: Progressing     Problem: Pain  Goal: Verbalizes/displays adequate comfort level or baseline comfort level  7/1/2022 0149 by Geovany Wilks RN  Outcome: Progressing  Flowsheets  Taken 7/1/2022 0000  Verbalizes/displays adequate comfort level or baseline comfort level: Assess pain using appropriate pain scale  Taken 6/30/2022 2000  Verbalizes/displays adequate comfort level or baseline comfort level: Assess pain using appropriate pain scale  6/30/2022 1727 by Juan F Etienne RN  Outcome: Progressing     Problem: Skin/Tissue Integrity  Goal: Absence of new skin breakdown  Description: 1. Monitor for areas of redness and/or skin breakdown  2. Assess vascular access sites hourly  3. Every 4-6 hours minimum:  Change oxygen saturation probe site  4. Every 4-6 hours:  If on nasal continuous positive airway pressure, respiratory therapy assess nares and determine need for appliance change or resting period.   7/1/2022 0149 by Geovany Wilks RN  Outcome: Progressing  6/30/2022 1727 by Juan F Etienne RN  Outcome: Progressing     Problem: Safety - Adult  Goal: Free from fall injury  Outcome: Progressing  Flowsheets (Taken 6/30/2022 2000)  Free From Fall Injury: Instruct family/caregiver on patient safety     Problem: ABCDS Injury Assessment  Goal: Absence of physical injury  7/1/2022 0149 by Geovany Wilks RN  Outcome: Progressing  Flowsheets (Taken 6/30/2022 2000)  Absence of Physical Injury: Implement safety measures based on patient assessment  6/30/2022 1727 by Diana Muniz RN  Outcome: Progressing  Flowsheets (Taken 6/30/2022 0800)  Absence of Physical Injury: Implement safety measures based on patient assessment     Problem: Respiratory - Adult  Goal: Achieves optimal ventilation and oxygenation  Outcome: Progressing  Flowsheets (Taken 6/30/2022 2000)  Achieves optimal ventilation and oxygenation:   Assess for changes in respiratory status   Assess for changes in mentation and behavior     Problem: Safety - Medical Restraint  Goal: Remains free of injury from restraints (Restraint for Interference with Medical Device)  Description: INTERVENTIONS:  1. Determine that other, less restrictive measures have been tried or would not be effective before applying the restraint  2. Evaluate the patient's condition at the time of restraint application  3. Inform patient/family regarding the reason for restraint  4.  Q2H: Monitor safety, psychosocial status, comfort, nutrition and hydration  7/1/2022 0149 by Stalin Godinez RN  Outcome: Progressing  Flowsheets  Taken 7/1/2022 0000  Remains free of injury from restraints (restraint for interference with medical device):   Determine that other, less restrictive measures have been tried or would not be effective before applying the restraint   Every 2 hours: Monitor safety, psychosocial status, comfort, nutrition and hydration  Taken 6/30/2022 2200  Remains free of injury from restraints (restraint for interference with medical device): Determine that other, less restrictive measures have been tried or would not be effective before applying the restraint  Taken 6/30/2022 2000  Remains free of injury from restraints (restraint for interference with medical device): Determine that other, less restrictive measures have been tried or would not be effective before applying the restraint  6/30/2022 1730 by Diana Muniz RN  Outcome: Progressing  Flowsheets  Taken 6/30/2022 1400 by Diana Muniz RN  Remains free of injury from restraints (restraint for interference with medical device):   Determine that other, less restrictive measures have been tried or would not be effective before applying the restraint   Evaluate the patient's condition at the time of restraint application   Inform patient/family regarding the reason for restraint   Every 2 hours: Monitor safety, psychosocial status, comfort, nutrition and hydration  Taken 6/30/2022 1200 by Yvette Meier RN  Remains free of injury from restraints (restraint for interference with medical device):   Determine that other, less restrictive measures have been tried or would not be effective before applying the restraint   Evaluate the patient's condition at the time of restraint application   Inform patient/family regarding the reason for restraint   Every 2 hours: Monitor safety, psychosocial status, comfort, nutrition and hydration  Taken 6/30/2022 1000 by Yvette Meier RN  Remains free of injury from restraints (restraint for interference with medical device):   Determine that other, less restrictive measures have been tried or would not be effective before applying the restraint   Evaluate the patient's condition at the time of restraint application   Inform patient/family regarding the reason for restraint   Every 2 hours: Monitor safety, psychosocial status, comfort, nutrition and hydration  Taken 6/30/2022 0800 by Darryl Espinal RN  Remains free of injury from restraints (restraint for interference with medical device): Determine that other, less restrictive measures have been tried or would not be effective before applying the restraint     Problem: Nutrition Deficit:  Goal: Optimize nutritional status  Outcome: Progressing

## 2022-07-01 NOTE — PROGRESS NOTES
Critical Care Team - Daily Progress Note      Date and time: 7/1/2022 7:49 AM  Patient's name:  Loi Benson  Medical Record Number: [id number]  Patient's account/billing number: [de-identified]  Patient's YOB: 1950  Age: 66 y.o. Date of Admission: 6/27/2022 12:28 PM  Length of stay during current admission: 4      Primary Care Physician: Monique Boss MD  ICU Attending Physician: Dr. Tyrese Pérez Status: Full Code    Reason for ICU admission:   Chief Complaint   Patient presents with    Bradycardia       Subjective:     OVERNIGHT EVENTS:  No significant events overnight    Today:  Patient is currently intubated, sedated with Precedex and off of the Versed with pressor support of levophed 3  following vent settings :  Mode PRVC      RR 20        tidal volume 350     PEEP      FiO2 30%  ABGs:  pH 7.35        PCO2 51.1      PO2 90.9     HCO3 28.4  Intake output record:  Total intake 1691 mL urine 15 mL   dialysis 2300 mL     net -623 mL   Patient is currently afebrile with a heart rate in 60s  Blood cultures and urine cultures are negative- currently on cefepime            AWAKE & FOLLOWING COMMANDS:  [x] No (, opens eyes to verbal commands) [] Yes    CURRENT VENTILATION STATUS:     [x] Ventilator  [] BIPAP  [] Nasal Cannula [] Room Air          SECRETIONS Amount:  [] Small [] Moderate  [] Large  [x] None  Color:     [] White [] Colored  [] Bloody    SEDATION:  RAAS Score:  [] Propofol gtt  [] Versed gtt  [] Ativan gtt   [x] No Sedation    PARALYZED:  [x] No    [] Yes    DIARRHEA:                [x] No                [] Yes  (C. Difficile status: [] positive                                                                                                                       [] negative                                                                                                                     [] pending)    VASOPRESSORS:  [] No    [x] Yes    If yes -   [x] Levophed       [] Dopamine     [] Vasopressin       [] Dobutamine  [] Phenylephrine         [] Epinephrine    CENTRAL LINES:     [] No   [x] Yes   (Date of Insertion:   )           If yes -     [] Right IJ     [] Left IJ [] Right Femoral [] Left Femoral                   [] Right Subclavian [] Left Subclavian       GUTIERREZ'S CATHETER:   [] No   [x] Yes  (Date of Insertion:   )     URINE OUTPUT:            [] Good   [x] Low              [] Anuric    REVIEW OF SYSTEMS:  Unable to obtain due to patient mentation.      OBJECTIVE:     VITAL SIGNS:  BP (!) 98/40   Pulse 60   Temp 97.2 °F (36.2 °C) (Esophageal)   Resp 26   Ht 4' 11\" (1.499 m)   Wt 155 lb 3.3 oz (70.4 kg)   SpO2 98%   BMI 31.35 kg/m²   Tmax over 24 hours:  Temp (24hrs), Av.5 °F (36.4 °C), Min:95.9 °F (35.5 °C), Max:99.1 °F (37.3 °C)      Patient Vitals for the past 8 hrs:   BP Temp Temp src Pulse Resp SpO2   22 0530 (!) 98/40 -- -- 60 26 98 %   22 0515 (!) 129/47 -- -- 60 26 98 %   22 0514 -- -- -- -- (!) 34 --   22 0506 -- -- -- 61 26 99 %   22 0500 (!) 166/45 -- -- 57 26 99 %   22 0445 (!) 93/35 -- -- 58 26 100 %   22 0430 (!) 92/45 -- -- 59 26 99 %   22 0415 (!) 97/46 -- -- 59 26 99 %   22 0400 (!) 123/49 97.2 °F (36.2 °C) Esophageal 67 20 99 %   22 0347 -- -- -- 60 26 99 %   22 0345 (!) 108/59 -- -- 61 (!) 0 99 %   22 0330 (!) 120/52 -- -- 63 (!) 6 99 %   22 0315 (!) 118/53 -- -- 65 (!) 6 99 %   22 0300 (!) 113/51 -- -- 64 (!) 0 99 %   22 0245 (!) 111/52 -- -- 65 (!) 6 99 %   22 0230 (!) 115/53 -- -- 64 (!) 1 99 %   22 0215 (!) 114/50 -- -- 64 (!) 8 99 %   22 0200 (!) 114/49 99.1 °F (37.3 °C) Esophageal 65 20 99 %   22 0145 (!) 113/50 -- -- 67 19 99 %   22 0130 (!) 113/50 -- -- 69 26 98 %   22 0126 -- -- -- -- 22 --   22 0115 (!) 111/46 -- -- 71 25 98 %   22 0100 (!) 148/55 -- -- 93 27 97 %   22 0045 (!) 109/50 -- -- 66 21 97 % 07/01/22 0030 (!) 117/54 -- -- 66 25 97 %   07/01/22 0015 (!) 117/49 -- -- 67 23 97 %   07/01/22 0000 (!) 117/52 99 °F (37.2 °C) Esophageal 67 23 97 %         Intake/Output Summary (Last 24 hours) at 7/1/2022 0749  Last data filed at 7/1/2022 0718  Gross per 24 hour   Intake 1865.66 ml   Output 2315 ml   Net -449.34 ml     Date 07/01/22 0000 - 07/01/22 2359   Shift 0635-2779 6022-2456 0239-0435 24 Hour Total   INTAKE   I.V.(mL/kg) 173.7(2.5)   173.7(2.5)   NG/GT(mL/kg) 245(3.5)   245(3.5)   Shift Total(mL/kg) 418.7(5.9)   418.7(5.9)   OUTPUT   Shift Total(mL/kg)       Weight (kg) 70.4 70.4 70.4 70.4     Wt Readings from Last 3 Encounters:   06/30/22 155 lb 3.3 oz (70.4 kg)   06/22/22 152 lb (68.9 kg)   06/21/22 152 lb (68.9 kg)     Body mass index is 31.35 kg/m². PHYSICAL EXAM:  Constitutional: Intubated, sedated, opens eyes to verbal commands  HEENT: PERRLA, EOMI, sclera clear, anicteric  Respiratory: Bilateral mechanical breath sounds  Cardiovascular: regular rate and rhythm, normal S1, S2, no murmur noted and 2+ pulses throughout  Abdomen: soft, nontender, nondistended, no masses or organomegaly  NEUROLOGIC: drowsy, but waking up to strong verbal cues, hard of hearing  Cranial nerves II-XII are grossly intact. Extremities:  peripheral pulses normal, 2+ pedal edema,.     MEDICATIONS:  Scheduled Meds:   midodrine  5 mg Per NG tube TID WC    sodium polystyrene  15 g Oral Once    cefepime  1,000 mg IntraVENous Q24H    [Held by provider] amiodarone  100 mg Oral Daily    rocuronium  0.6 mg/kg IntraVENous Once    albuterol  2.5 mg Nebulization Q6H    sodium chloride flush  5-40 mL IntraVENous 2 times per day    aspirin EC  81 mg Oral Daily    atorvastatin  40 mg Oral Daily    magnesium oxide  400 mg Oral BID    miconazole   Topical BID    ferrous sulfate  325 mg Oral Daily with breakfast     Continuous Infusions:   dexmedetomidine 0.7 mcg/kg/hr (07/01/22 0718)    sodium chloride      DOPamine  norepinephrine 1 mcg/min (07/01/22 0718)    midazolam Stopped (06/30/22 1545)    dextrose      sodium chloride 10 mL/hr at 07/01/22 0718    [Held by provider] heparin (PORCINE) Infusion Stopped (06/29/22 1131)     PRN Meds:   sodium chloride, , PRN  fentanNYL, 50 mcg, Q2H PRN  heparin (porcine), 1,300 Units, PRN  heparin (porcine), 1,200 Units, PRN  glucose, 4 tablet, PRN  dextrose bolus, 125 mL, PRN   Or  dextrose bolus, 250 mL, PRN  glucagon (rDNA), 1 mg, PRN  dextrose, 100 mL/hr, PRN  sodium chloride flush, 5-40 mL, PRN  sodium chloride, , PRN  ondansetron, 4 mg, Q8H PRN   Or  ondansetron, 4 mg, Q6H PRN  polyethylene glycol, 17 g, Daily PRN  acetaminophen, 650 mg, Q6H PRN   Or  acetaminophen, 650 mg, Q6H PRN  heparin (porcine), 4,000 Units, PRN  heparin (porcine), 2,000 Units, PRN  albuterol sulfate HFA, 2 puff, Q6H PRN  morphine, 2 mg, Q4H PRN        SUPPORT DEVICES: [x] Ventilator [] BIPAP  [] Nasal Cannula [] Room Air    VENT SETTINGS (Comprehensive) (if applicable):  Vent Information  Ventilator ID: [id number]  Vent Mode: PRVC  Additional Respiratory Assessments  Heart Rate: 60  Resp: 26  SpO2: 98 %  End Tidal CO2: 33 (%)  Position: Semi-Leung's  Humidification Source: HME  Cuff Pressure (cm H2O):  (mov)    ABGs:     Lab Results   Component Value Date/Time    PHART 7.459 03/09/2022 04:28 AM    ICL8UFB 39.9 03/09/2022 04:28 AM    PO2ART 107.0 03/09/2022 04:28 AM    KZW7XGI 28.3 03/09/2022 04:28 AM    R6NIHUSN 97.4 03/09/2022 04:28 AM    FIO2 30.0 07/01/2022 04:50 AM     Lactic Acid:   Lab Results   Component Value Date/Time    LACTA 0.7 03/04/2022 02:30 PM    LACTA 1.2 02/18/2021 01:00 AM    LACTA 1.8 07/25/2020 11:20 AM         DATA:  Complete Blood Count:   Recent Labs     06/29/22  0335 06/29/22  2035 06/30/22  0429 06/30/22  0429 06/30/22  1249 06/30/22  2241 07/01/22  0546   WBC 12.1*  --  8.7  --   --   --  7.8   HGB 8.7*   < > 7.1*   < > 6.9* 8.0* 8.1*   .1  --  102.6  --   --   --  101.9   --  161  --   --   --  150   RBC 2.88*  --  2.35*  --   --   --  2.68*   HCT 29.4*   < > 24.1*   < > 22.2* 25.8* 27.3*   MCH 30.2  --  30.2  --   --   --  30.2   MCHC 29.6  --  29.5  --   --   --  29.7   RDW 16.3*  --  16.2*  --   --   --  16.7*   MPV 9.8  --  9.8  --   --   --  10.7    < > = values in this interval not displayed. PT/INR:    Lab Results   Component Value Date/Time    PROTIME 10.8 06/14/2022 02:56 AM    INR 1.0 06/14/2022 02:56 AM     PTT:    Lab Results   Component Value Date/Time    APTT 29.7 06/29/2022 04:13 PM       Basal Metabolic Profile:   Recent Labs     06/29/22  0335 06/30/22  0429 07/01/22  0546   * 137 136   K 4.3 4.2 5.0   BUN 46* 30* 42*   CREATININE 1.93* 1.48* 1.93*    101 102   CO2 23 26 27      Magnesium:   Lab Results   Component Value Date/Time    MG 2.3 06/09/2022 06:00 AM    MG 2.4 06/07/2022 09:45 PM    MG 2.3 05/18/2022 02:19 PM     Phosphorus:   Lab Results   Component Value Date/Time    PHOS 4.4 06/15/2022 08:07 AM    PHOS 4.6 05/18/2022 02:19 PM    PHOS 4.5 04/28/2022 03:25 PM     S. Calcium:  Recent Labs     07/01/22  0546   CALCIUM 7.7*     S. Ionized Calcium:No results for input(s): IONCA in the last 72 hours.       Urinalysis:   Lab Results   Component Value Date/Time    NITRU NEGATIVE 06/27/2022 06:35 PM    COLORU Dark Yellow 06/27/2022 06:35 PM    PHUR 5.5 06/27/2022 06:35 PM    WBCUA 20 TO 50 06/27/2022 06:35 PM    RBCUA 20 TO 50 06/27/2022 06:35 PM    MUCUS 1+ 06/27/2022 06:35 PM    TRICHOMONAS NOT REPORTED 02/07/2022 02:42 AM    YEAST NOT REPORTED 02/07/2022 02:42 AM    BACTERIA FEW 06/12/2022 09:33 PM    SPECGRAV 1.030 06/27/2022 06:35 PM    LEUKOCYTESUR SMALL 06/27/2022 06:35 PM    UROBILINOGEN Normal 06/27/2022 06:35 PM    BILIRUBINUR NEGATIVE 06/27/2022 06:35 PM    GLUCOSEU 1+ 06/27/2022 06:35 PM    KETUA TRACE 06/27/2022 06:35 PM    AMORPHOUS NOT REPORTED 02/07/2022 02:42 AM       CARDIAC ENZYMES: No results for input(s): CKMB,    DVT Prophylaxis  ? Will resume Heparin gtt         Maggy Villarreal MD,        Resident internal medicine, PGY-3  [address] ([facility])             7/1/2022, 7:49 AM      Attending Physician Statement  I have discussed the care of [address] [place] [address] [address], including pertinent history and exam findings with the resident. I have reviewed the key elements of all parts of the encounter with the resident. I have seen and examined the patient with the resident. I agree with the assessment and plan and status of the problem list as documented. I have seen the patient during today, chart reviewed, labs and medications reviewed overnight events noted by  Patient is off Versed drip and she is on Precedex drip. She is slightly more arousable but did not follow command eyes blinking on stimulation intermittently she did follow command per nursing staff. She remained intubated on ventilator remains somnolent weak cough. Ventilator setting PRVC/20/350/8/30 percent ABG 7.3 5/51/91/28. Minimal urine output. Creatinine is 1.93 today. She had hemodialysis yesterday possible hemodialysis today. Her heart rate is in 60s. Continue with cefepime currently follow-up with infectious disease. Continue ventilatory support and start spontaneous breathing trial.  If she tolerate spontaneous breathing trial depending upon her mentation and neuro status and ability to airway clearance and mobilization of secretions before consideration for extubation     Discussed with daughter and updated questions answered  Discussed with ICU nursing staff, treatment and plan discussed.   Discussed with respiratory therapist.    Total critical care time caring for this patient with life threatening, unstable organ failure, including direct patient contact, management of life support systems, review of data including imaging and labs, discussions with other team members and physicians at least 27  Min so far today, excluding procedures. Please note that this chart was generated using voice recognition Dragon dictation software. Although every effort was made to ensure the accuracy of this automated transcription, some errors in transcription may have occurred.      Charity Monge MD  7/1/2022 11:12 AM

## 2022-07-01 NOTE — PROGRESS NOTES
Physical Therapy        Physical Therapy Cancel Note      DATE: 2022    NAME: Shad Moy  MRN: [id number]   : 1950      Patient not seen this date for Physical Therapy due to:    Patient is not appropriate for PT evaluation/treatment at this time d/t int/sed      Electronically signed by Renea Hutton PT on 2022 at 12:01 PM

## 2022-07-02 LAB
ABSOLUTE EOS #: 0.07 K/UL (ref 0–0.4)
ABSOLUTE IMMATURE GRANULOCYTE: 0.2 K/UL (ref 0–0.3)
ABSOLUTE LYMPH #: 0.48 K/UL (ref 1–4.8)
ABSOLUTE MONO #: 0.07 K/UL (ref 0.1–0.8)
ALLEN TEST: POSITIVE
ANION GAP SERPL CALCULATED.3IONS-SCNC: 7 MMOL/L (ref 9–17)
BASOPHILS # BLD: 0 % (ref 0–2)
BASOPHILS ABSOLUTE: 0 K/UL (ref 0–0.2)
BUN BLDV-MCNC: 24 MG/DL (ref 8–23)
CALCIUM SERPL-MCNC: 8.1 MG/DL (ref 8.6–10.4)
CHLORIDE BLD-SCNC: 102 MMOL/L (ref 98–107)
CO2: 25 MMOL/L (ref 20–31)
CREAT SERPL-MCNC: 1.35 MG/DL (ref 0.5–0.9)
CULTURE: NORMAL
CULTURE: NORMAL
EOSINOPHILS RELATIVE PERCENT: 1 % (ref 1–4)
FIO2: 30
GFR AFRICAN AMERICAN: 47 ML/MIN
GFR NON-AFRICAN AMERICAN: 39 ML/MIN
GFR SERPL CREATININE-BSD FRML MDRD: ABNORMAL ML/MIN/{1.73_M2}
GLUCOSE BLD-MCNC: 137 MG/DL (ref 70–99)
GLUCOSE BLD-MCNC: 141 MG/DL (ref 74–100)
HCT VFR BLD CALC: 25.1 % (ref 36.3–47.1)
HCT VFR BLD CALC: 26.5 % (ref 36.3–47.1)
HEMOGLOBIN: 7.8 G/DL (ref 11.9–15.1)
HEMOGLOBIN: 8.3 G/DL (ref 11.9–15.1)
IMMATURE GRANULOCYTES: 3 %
LYMPHOCYTES # BLD: 7 % (ref 24–44)
Lab: NORMAL
Lab: NORMAL
MCH RBC QN AUTO: 31.3 PG (ref 25.2–33.5)
MCHC RBC AUTO-ENTMCNC: 31.3 G/DL (ref 28.4–34.8)
MCV RBC AUTO: 100 FL (ref 82.6–102.9)
MONOCYTES # BLD: 1 % (ref 1–7)
MORPHOLOGY: ABNORMAL
NRBC AUTOMATED: 0.3 PER 100 WBC
O2 DEVICE/FLOW/%: NORMAL
PDW BLD-RTO: 16.7 % (ref 11.8–14.4)
PHOSPHORUS: 2.2 MG/DL (ref 2.6–4.5)
PLATELET # BLD: 289 K/UL (ref 138–453)
PMV BLD AUTO: 11.5 FL (ref 8.1–13.5)
POC HCO3: 27.3 MMOL/L (ref 21–28)
POC O2 SATURATION: 97 % (ref 94–98)
POC PCO2: 47.7 MM HG (ref 35–48)
POC PH: 7.37 (ref 7.35–7.45)
POC PO2: 97.1 MM HG (ref 83–108)
POSITIVE BASE EXCESS, ART: 2 (ref 0–3)
POTASSIUM SERPL-SCNC: 4.1 MMOL/L (ref 3.7–5.3)
RBC # BLD: 2.65 M/UL (ref 3.95–5.11)
SAMPLE SITE: NORMAL
SEG NEUTROPHILS: 88 % (ref 36–66)
SEGMENTED NEUTROPHILS ABSOLUTE COUNT: 5.98 K/UL (ref 1.8–7.7)
SODIUM BLD-SCNC: 134 MMOL/L (ref 135–144)
SPECIMEN DESCRIPTION: NORMAL
SPECIMEN DESCRIPTION: NORMAL
WBC # BLD: 6.8 K/UL (ref 3.5–11.3)

## 2022-07-02 PROCEDURE — 82803 BLOOD GASES ANY COMBINATION: CPT

## 2022-07-02 PROCEDURE — 90935 HEMODIALYSIS ONE EVALUATION: CPT

## 2022-07-02 PROCEDURE — 84100 ASSAY OF PHOSPHORUS: CPT

## 2022-07-02 PROCEDURE — 2580000003 HC RX 258: Performed by: STUDENT IN AN ORGANIZED HEALTH CARE EDUCATION/TRAINING PROGRAM

## 2022-07-02 PROCEDURE — 51798 US URINE CAPACITY MEASURE: CPT

## 2022-07-02 PROCEDURE — 85014 HEMATOCRIT: CPT

## 2022-07-02 PROCEDURE — 2500000003 HC RX 250 WO HCPCS: Performed by: STUDENT IN AN ORGANIZED HEALTH CARE EDUCATION/TRAINING PROGRAM

## 2022-07-02 PROCEDURE — 85025 COMPLETE CBC W/AUTO DIFF WBC: CPT

## 2022-07-02 PROCEDURE — 6370000000 HC RX 637 (ALT 250 FOR IP): Performed by: STUDENT IN AN ORGANIZED HEALTH CARE EDUCATION/TRAINING PROGRAM

## 2022-07-02 PROCEDURE — 82947 ASSAY GLUCOSE BLOOD QUANT: CPT

## 2022-07-02 PROCEDURE — 6360000002 HC RX W HCPCS: Performed by: STUDENT IN AN ORGANIZED HEALTH CARE EDUCATION/TRAINING PROGRAM

## 2022-07-02 PROCEDURE — 36415 COLL VENOUS BLD VENIPUNCTURE: CPT

## 2022-07-02 PROCEDURE — 94003 VENT MGMT INPAT SUBQ DAY: CPT

## 2022-07-02 PROCEDURE — 2000000000 HC ICU R&B

## 2022-07-02 PROCEDURE — 6360000002 HC RX W HCPCS: Performed by: INTERNAL MEDICINE

## 2022-07-02 PROCEDURE — 99291 CRITICAL CARE FIRST HOUR: CPT | Performed by: INTERNAL MEDICINE

## 2022-07-02 PROCEDURE — 85018 HEMOGLOBIN: CPT

## 2022-07-02 PROCEDURE — 99232 SBSQ HOSP IP/OBS MODERATE 35: CPT | Performed by: INTERNAL MEDICINE

## 2022-07-02 PROCEDURE — 94640 AIRWAY INHALATION TREATMENT: CPT

## 2022-07-02 PROCEDURE — 94761 N-INVAS EAR/PLS OXIMETRY MLT: CPT

## 2022-07-02 PROCEDURE — 36600 WITHDRAWAL OF ARTERIAL BLOOD: CPT

## 2022-07-02 PROCEDURE — 80048 BASIC METABOLIC PNL TOTAL CA: CPT

## 2022-07-02 PROCEDURE — 2700000000 HC OXYGEN THERAPY PER DAY

## 2022-07-02 RX ORDER — LEVOTHYROXINE SODIUM 0.03 MG/1
25 TABLET ORAL DAILY
Status: DISCONTINUED | OUTPATIENT
Start: 2022-07-02 | End: 2022-07-18 | Stop reason: HOSPADM

## 2022-07-02 RX ADMIN — ALBUTEROL SULFATE 2.5 MG: 2.5 SOLUTION RESPIRATORY (INHALATION) at 08:05

## 2022-07-02 RX ADMIN — ANTI-FUNGAL POWDER MICONAZOLE NITRATE TALC FREE: 1.42 POWDER TOPICAL at 20:13

## 2022-07-02 RX ADMIN — HEPARIN SODIUM 1200 UNITS: 1000 INJECTION INTRAVENOUS; SUBCUTANEOUS at 11:10

## 2022-07-02 RX ADMIN — ATORVASTATIN CALCIUM 40 MG: 80 TABLET, FILM COATED ORAL at 07:51

## 2022-07-02 RX ADMIN — FERROUS SULFATE TAB EC 325 MG (65 MG FE EQUIVALENT) 325 MG: 325 (65 FE) TABLET DELAYED RESPONSE at 07:51

## 2022-07-02 RX ADMIN — ANTI-FUNGAL POWDER MICONAZOLE NITRATE TALC FREE: 1.42 POWDER TOPICAL at 07:52

## 2022-07-02 RX ADMIN — MAGNESIUM GLUCONATE 500 MG ORAL TABLET 400 MG: 500 TABLET ORAL at 07:51

## 2022-07-02 RX ADMIN — FENTANYL CITRATE 50 MCG: 50 INJECTION, SOLUTION INTRAMUSCULAR; INTRAVENOUS at 21:34

## 2022-07-02 RX ADMIN — MIDODRINE HYDROCHLORIDE 5 MG: 5 TABLET ORAL at 17:15

## 2022-07-02 RX ADMIN — LEVOTHYROXINE SODIUM 25 MCG: 25 TABLET ORAL at 13:46

## 2022-07-02 RX ADMIN — SODIUM CHLORIDE, PRESERVATIVE FREE 10 MG: 5 INJECTION INTRAVENOUS at 11:05

## 2022-07-02 RX ADMIN — ALBUTEROL SULFATE 2.5 MG: 2.5 SOLUTION RESPIRATORY (INHALATION) at 14:09

## 2022-07-02 RX ADMIN — MAGNESIUM GLUCONATE 500 MG ORAL TABLET 400 MG: 500 TABLET ORAL at 20:13

## 2022-07-02 RX ADMIN — MIDODRINE HYDROCHLORIDE 5 MG: 5 TABLET ORAL at 07:51

## 2022-07-02 RX ADMIN — ALBUTEROL SULFATE 2.5 MG: 2.5 SOLUTION RESPIRATORY (INHALATION) at 20:45

## 2022-07-02 RX ADMIN — MIDODRINE HYDROCHLORIDE 5 MG: 5 TABLET ORAL at 12:15

## 2022-07-02 RX ADMIN — DEXMEDETOMIDINE HYDROCHLORIDE 0.9 MCG/KG/HR: 4 INJECTION, SOLUTION INTRAVENOUS at 07:06

## 2022-07-02 RX ADMIN — FENTANYL CITRATE 50 MCG: 50 INJECTION, SOLUTION INTRAMUSCULAR; INTRAVENOUS at 05:22

## 2022-07-02 RX ADMIN — Medication 81 MG: at 07:52

## 2022-07-02 RX ADMIN — FENTANYL CITRATE 50 MCG: 50 INJECTION, SOLUTION INTRAMUSCULAR; INTRAVENOUS at 23:28

## 2022-07-02 RX ADMIN — DEXMEDETOMIDINE HYDROCHLORIDE 0.4 MCG/KG/HR: 4 INJECTION, SOLUTION INTRAVENOUS at 19:37

## 2022-07-02 RX ADMIN — CEFEPIME HYDROCHLORIDE 1000 MG: 1 INJECTION, POWDER, FOR SOLUTION INTRAMUSCULAR; INTRAVENOUS at 12:11

## 2022-07-02 RX ADMIN — HEPARIN SODIUM 1300 UNITS: 1000 INJECTION INTRAVENOUS; SUBCUTANEOUS at 11:10

## 2022-07-02 RX ADMIN — SODIUM CHLORIDE, PRESERVATIVE FREE 10 ML: 5 INJECTION INTRAVENOUS at 07:52

## 2022-07-02 RX ADMIN — SODIUM CHLORIDE: 9 INJECTION, SOLUTION INTRAVENOUS at 05:19

## 2022-07-02 ASSESSMENT — PULMONARY FUNCTION TESTS
PIF_VALUE: 19
PIF_VALUE: 20
PIF_VALUE: 18
PIF_VALUE: 17
PIF_VALUE: 19
PIF_VALUE: 19
PIF_VALUE: 15
PIF_VALUE: 19
PIF_VALUE: 19
PIF_VALUE: 20
PIF_VALUE: 15
PIF_VALUE: 19
PIF_VALUE: 14
PIF_VALUE: 16
PIF_VALUE: 20
PIF_VALUE: 19
PIF_VALUE: 16
PIF_VALUE: 13
PIF_VALUE: 19
PIF_VALUE: 18
PIF_VALUE: 19
PIF_VALUE: 17
PIF_VALUE: 19
PIF_VALUE: 17
PIF_VALUE: 12
PIF_VALUE: 13
PIF_VALUE: 17

## 2022-07-02 NOTE — PLAN OF CARE
Problem: Respiratory - Adult  Goal: Achieves optimal ventilation and oxygenation  7/2/2022 0929 by Dany Grace RCP  Outcome: Progressing  7/2/2022 0616 by Jill Barclay RN  Outcome: Progressing

## 2022-07-02 NOTE — PROGRESS NOTES
[facility]  Progress Note    Tip Zheng  MRN: [id number]  Date: 7/2/2022  LOS: 5     Reason for F/U :   Concern for septic shock    Impression :   1. Severe bradycardia-resolved  · requiring dopamine and Levophed for blood pressure support  · Able to wean off the dopamine 6/29/22  2. Acute respiratory insufficiency-ventilator dependent intubated 6/28/2022  3. Pulmonary edema  4. Acute kidney injury on chronic kidney disease  · Started on hemodialysis 6/28/2022  5. Leukocytosis-unclear etiology at this time  6. Shock-on Levophed  7. Multiple/bilateral lower extremity ulcerations-superficial not acutely infected  8. Coccygeal ulceration/right gluteal ulceration-clean    Recommendations:   · The patient's culture data remains negative. · The patient did receive 1 dose of levofloxacin on 6/27/2022 and cefepime 6/28 through 7/2/2022  · The plan is to continue her off antimicrobial therapy  · The patient is tolerating weaning and hopefully will be extubated soon    Infection Control Recommendations:   Universal precautions    Discharge Planning:   Estimated Length of IV antimicrobials: 7/2/22  Patient will need Midline Catheter Insertion/ PICC line Insertion: No  Patient will need: Home IV , [place],  SNF,  LTAC: Undetermined  Patient willneed outpatient wound care: No    Medical Decision making / Summary of Stay:   Bethene Labs is a 66y.o.-year-old female who was initially admitted on 6/27/2022.    Swati Storey has a history of diabetes mellitus type 2 with associated chronic kidney disease stage IV, hyperlipidemia, coronary artery disease, hypertension, asthma, previous CVA, acute on chronic combined systolic and diastolic heart failure, chronic left lower extremity wound for which she follows in the wound care center, meningioma     The patient was short of breath at home O2 sats in the low 90% on oxygen.     The patient was reported to be more fatigued than normal and the heart rate was found to be 35 and the patient was started on transcutaneous monitoring and atropine was given by EMS. The patient had a recent 48-hour Holter monitor showing sinus rhythm with some PACs earlier this month. The patient has required epinephrine for blood pressure support as this was switched to dopamine and Levophed which she continues currently. She is on BiPAP and currently nonconversant. She has developed acute kidney injury and will be started on hemodialysis. The chest x-ray shows findings consistent with pulmonary edema/congestion and the patient has been lethargic since admission. I was asked to evaluate for leukocytosis and bilateral lower extremity leg ulcers. The patient    Current evaluation:2022    BP (!) 124/51   Pulse 70   Temp 99 °F (37.2 °C) (Esophageal)   Resp 21   Ht 4' 11\" (1.499 m)   Wt 151 lb 10.8 oz (68.8 kg)   SpO2 97%   BMI 30.63 kg/m²     Temperature Range: Temp: 99 °F (37.2 °C) Temp  Av.3 °F (36.8 °C)  Min: 96.8 °F (36 °C)  Max: 100.2 °F (37.9 °C)  The patient is seen and evaluated at bedside she is on the ventilator at 30% FiO2 and 8 of PEEP. She is awake and alert seems uncomfortable on the ventilator. She was squeezing my hands during my evaluation following commands for me. Her 2 daughters were in the room at the time of my evaluation. .    Review of Systems   Unable to perform ROS: Intubated       Physical Examination :     Physical Exam  Constitutional:       Appearance: She is well-developed. Interventions: She is sedated and intubated. HENT:      Head: Normocephalic and atraumatic. Cardiovascular:      Rate and Rhythm: Regular rhythm. Heart sounds: Murmur heard. Pulmonary:      Effort: Pulmonary effort is normal. She is intubated. Breath sounds: Normal breath sounds. Abdominal:      General: Bowel sounds are normal.      Palpations: Abdomen is soft.    Musculoskeletal:         General: Swelling (Bilateral upper extremities) present. Skin:     General: Skin is warm and dry. Comments: The legs and feet are currently in wrapped in Unna boots   Neurological:      Mental Status: She is oriented to person, place, and time. Laboratory data:   I have independently reviewed the followinglabs:  CBC with Differential:   Recent Labs     07/01/22  0546 07/01/22  0546 07/01/22  1421 07/02/22  0511   WBC 7.8  --   --  6.8   HGB 8.1*   < > 8.2* 8.3*   HCT 27.3*   < > 27.2* 26.5*     --   --  289   LYMPHOPCT 7*  --   --  7*   MONOPCT 10  --   --  1    < > = values in this interval not displayed. BMP:   Recent Labs     07/01/22  0546 07/02/22  0511    134*   K 5.0 4.1    102   CO2 27 25   BUN 42* 24*   CREATININE 1.93* 1.35*     Hepatic Function Panel:   No results for input(s): PROT, LABALBU, BILIDIR, IBILI, BILITOT, ALKPHOS, ALT, AST in the last 72 hours.       Lab Results   Component Value Date/Time    PROCAL 0.11 06/27/2022 12:44 PM    PROCAL 0.26 03/07/2022 05:53 AM     Lab Results   Component Value Date/Time    CRP <3.0 03/01/2022 10:07 PM    CRP <3.0 02/07/2022 06:23 AM     Lab Results   Component Value Date    SEDRATE 7 10/17/2017         Lab Results   Component Value Date/Time    DDIMER 0.71 07/25/2020 11:20 AM     Lab Results   Component Value Date/Time    FERRITIN 13 03/01/2022 10:07 PM    FERRITIN 155 07/25/2020 11:20 AM     Lab Results   Component Value Date/Time     03/10/2022 11:50 AM     07/25/2020 11:20 AM     Lab Results   Component Value Date/Time    FIBRINOGEN 610 07/25/2020 11:20 AM       Results in Past 30 Days  Result Component Current Result Ref Range Previous Result Ref Range   SARS-CoV-2, Rapid Not Detected (6/27/2022) Not Detected Not Detected (6/12/2022) Not Detected     Lab Results   Component Value Date/Time    COVID19 Not Detected 06/27/2022 01:44 PM    COVID19 Not Detected 06/12/2022 06:34 PM    COVID19 Not Detected 03/01/2022 10:34 PM    COVID19 Not Detected 02/06/2022 09:46 AM    COVID19 Not Detected 07/25/2020 12:17 PM       No results for input(s): LASHA in the last 72 hours. Imaging Studies:   ONE XRAY VIEW OF THE CHEST 6/29/2022 7:19 am    Impression   Scattered infiltrates with bilateral effusions.       Support tubes as described above       Cultures:     Culture, Blood 1 [[id number]] Collected: 06/27/22 1311   Order Status: Completed Specimen: Blood Updated: 07/02/22 1331    Specimen Description . BLOOD    Special Requests NOT GIVEN    Culture NO GROWTH 5 DAYS   Culture, Blood 1 [[id number]] Collected: 06/27/22 1245   Order Status: Completed Specimen: Blood Updated: 07/02/22 1327    Specimen Description . BLOOD    Special Requests L AC 1ML    Culture NO GROWTH 5 DAYS       Culture, Urine [[id number]] Collected: 06/27/22 2007   Order Status: Completed Specimen: Urine Updated: 06/28/22 2106    Specimen Description . URINE    Culture NO GROWTH   MRSA DNA Probe, Nasal [[id number]] Collected: 06/27/22 1742   Order Status: Completed Specimen: Nasal Updated: 06/28/22 1041    Specimen Description . NASAL SWAB    MRSA, DNA, Nasal NEGATIVE    Comment: NEGATIVE:  MRSA DNA not detected by nucleic acid amplification.                                                     Results should be used as an adjunct to nosocomial control efforts to identify patients   needing enhanced precautions.     The test is not intended to identify patients with staphylococcal infections.  Results   should not be used to guide or monitor treatment for MRSA infections. MRSA DNA Probe, Nasal [[id number]]    Order Status: Canceled Specimen: Nares    COVID-19, Rapid [[id number]] Collected: 06/27/22 1344   Order Status: Completed Specimen: Nasopharyngeal Swab Updated: 06/27/22 1411    Specimen Description . NASOPHARYNGEAL SWAB    SARS-CoV-2, Rapid Not Detected    Comment:        Rapid NAAT:  The specimen is NEGATIVE for SARS-CoV-2, the novel coronavirus associated with   COVID-19.         The ID NOW COVID-19 assay is designed to detect the virus that causes COVID-19 in patients   with signs and symptoms of infection who are suspected of COVID-19. An individual without symptoms of COVID-19 and who is not shedding SARS-CoV-2 virus would   expect to have a negative (not detected) result in this assay. Negative results should be treated as presumptive and, if inconsistent with clinical signs   and symptoms or necessary for patient management,   should be tested with an alternative molecular assay. Negative results do not preclude   SARS-CoV-2 infection and   should not be used as the sole basis for patient management decisions.         Fact sheet for Healthcare Providers: BuildHer.es   Fact sheet for Patients: BuildHer.es           Methodology: Isothermal Nucleic Acid Amplification         Medications:      famotidine (PEPCID) injection  10 mg IntraVENous Daily    levothyroxine  25 mcg Oral Daily    lidocaine 1 % injection  5 mL IntraDERmal Once    sodium chloride flush  5-40 mL IntraVENous 2 times per day    midodrine  5 mg Per NG tube TID WC    sodium polystyrene  15 g Oral Once    [Held by provider] amiodarone  100 mg Oral Daily    rocuronium  0.6 mg/kg IntraVENous Once    albuterol  2.5 mg Nebulization Q6H    sodium chloride flush  5-40 mL IntraVENous 2 times per day    aspirin EC  81 mg Oral Daily    atorvastatin  40 mg Oral Daily    magnesium oxide  400 mg Oral BID    miconazole   Topical BID    ferrous sulfate  325 mg Oral Daily with breakfast           [facility]  Gay Davies MD  [employer] messaging  OFFICE: [phone]      Electronically signed by Gay Davies MD on 7/2/2022 at 4:46 PM  Thank you for allowing us to participate in the care of this patient. Please call with questions.     This note iscreated with the assistance of a speech recognition program.  While intending to generate a document that actually reflects the content of the visit, the document can still have some errors including those of syntax andsound a like substitutions which may escape proof reading. In such instances, actual meaning can be extrapolated by contextual diversion.

## 2022-07-02 NOTE — PROGRESS NOTES
Renal Progress Note    Patient :  Nat Alejandre; 66 y.o. MRN# [id number]  Location:  3020/3020-01  Attending:  Iris Weinstein MD  Admit Date:  6/27/2022   Hospital Day: 5      Subjective:     Patient was seen and examined on dialysis. Daughter at bedside. Daughter had many questions regarding dialysis. All questions were answered. Fluid removal goal set for 2.5 to 3 L today. Patient received hemodialysis via temporary catheter yesterday, 3 L removed. Today is her fifth HD treatment. tube feed infusing  Plan for breathing trial/extubation today. Blood pressures are soft, slightly lower 100.   She had 1 occurrence of urine output - minimal  BMP reviewed    Outpatient Medications:     Medications Prior to Admission: ferrous sulfate (IRON 325) 325 (65 Fe) MG tablet, Take 1 tablet by mouth daily (with breakfast)  apixaban (ELIQUIS) 2.5 MG TABS tablet, Take 1 tablet by mouth 2 times daily  apixaban (ELIQUIS) 2.5 MG TABS tablet, Take 1 tablet by mouth 2 times daily  ferrous sulfate (IRON 325) 325 (65 Fe) MG tablet, Take 1 tablet by mouth daily (with breakfast)  fluticasone-salmeterol (ADVAIR HFA) 115-21 MCG/ACT inhaler, Inhale 2 puffs into the lungs 2 times daily  metoprolol tartrate (LOPRESSOR) 25 MG tablet, Take 1 tablet by mouth 2 times daily  aspirin EC 81 MG EC tablet, Take 1 tablet by mouth daily  hydrALAZINE (APRESOLINE) 25 MG tablet, Take 1 tablet by mouth 3 times daily  albuterol sulfate HFA (PROAIR HFA) 108 (90 Base) MCG/ACT inhaler, Inhale 2 puffs into the lungs every 6 hours as needed for Wheezing or Shortness of Breath (cough)  metoprolol tartrate (LOPRESSOR) 25 MG tablet, Take 1 tablet by mouth 2 times daily  aspirin EC 81 MG EC tablet, Take 1 tablet by mouth daily  hydrALAZINE (APRESOLINE) 25 MG tablet, Take 1 tablet by mouth 3 times daily  albuterol sulfate HFA (PROAIR HFA) 108 (90 Base) MCG/ACT inhaler, Inhale 2 puffs into the lungs every 6 hours as needed for Wheezing or Shortness of Breath (cough)  fluticasone-salmeterol (ADVAIR HFA) 115-21 MCG/ACT inhaler, Inhale 2 puffs into the lungs 2 times daily Maintenance inhaler  amLODIPine (NORVASC) 10 MG tablet, Take 1 tablet by mouth nightly  Blood Pressure KIT, Diagnosis: HTN. Needs to check blood pressure 1-2 times a day until stable, then once a day. Goal blood pressure less than 135/85, and above 110/60. vitamin D (ERGOCALCIFEROL) 1.25 MG (25422 UT) CAPS capsule, Take 1 capsule by mouth once a week Sundays  amiodarone (CORDARONE) 200 MG tablet, Take 1 tablet by mouth daily  Nebulizers (COMPRESSOR/NEBULIZER) MISC, Nebulizer with compressor. Disposable Med Nebs 2 /month. Reusable Med Nebs 1 per 6 months. Aerosol Mask 1 per month. Replacement Filters 2 per month. All other related supplies as needed per month. Medications being used: Albuterol . 083 unit dose vial. Frequency: every 6 hrs x 4/day . Length of Need: 1 (Patient not taking: Reported on 6/17/2022)  albuterol (PROVENTIL) (2.5 MG/3ML) 0.083% nebulizer solution, Take 3 mLs by nebulization every 6 hours as needed for Wheezing or Shortness of Breath (Patient not taking: Reported on 6/17/2022)  Misc. Devices (ADJUST FOLD CANE/YORK HANDLE) MISC, Use daily for walking  Handicap Placard MISC, by Does not apply route Expires on 12/30/25  atorvastatin (LIPITOR) 40 MG tablet, Take 1 tablet by mouth once daily  desloratadine (CLARINEX) 5 MG tablet, Take 1 tablet by mouth once daily  FreeStyle Lancets MISC, 1 each by Does not apply route daily Patient needs to contact office before any further refills will be approved  magnesium oxide (MAG-OX) 400 (241.3 Mg) MG TABS tablet, Take 1 tablet by mouth twice daily  miconazole (MICOTIN) 2 % powder, Apply topically 2 times daily UNDER THE SKIN FOLDS LONG TERM  Multiple Vitamins-Minerals (THERAPEUTIC MULTIVITAMIN-MINERALS) tablet, Take 1 tablet by mouth daily  blood glucose test strips (FREESTYLE LITE) strip, 1 each by In Vitro route daily As needed.   Blood Pressure KIT, 1 kit by Does not apply route three times daily (Patient not taking: Reported on 6/17/2022)  blood glucose monitor kit and supplies, 1 kit by Other route three times daily Dispense Butterfly Elite CBG Device    Current Medications:     Scheduled Meds:    famotidine (PEPCID) injection  10 mg IntraVENous Daily    lidocaine 1 % injection  5 mL IntraDERmal Once    sodium chloride flush  5-40 mL IntraVENous 2 times per day    midodrine  5 mg Per NG tube TID WC    sodium polystyrene  15 g Oral Once    cefepime  1,000 mg IntraVENous Q24H    [Held by provider] amiodarone  100 mg Oral Daily    rocuronium  0.6 mg/kg IntraVENous Once    albuterol  2.5 mg Nebulization Q6H    sodium chloride flush  5-40 mL IntraVENous 2 times per day    aspirin EC  81 mg Oral Daily    atorvastatin  40 mg Oral Daily    magnesium oxide  400 mg Oral BID    miconazole   Topical BID    ferrous sulfate  325 mg Oral Daily with breakfast     Continuous Infusions:    sodium chloride      dexmedetomidine 0.9 mcg/kg/hr (07/02/22 0731)    sodium chloride      DOPamine      norepinephrine Stopped (07/02/22 0516)    midazolam Stopped (06/30/22 1545)    dextrose      sodium chloride 10 mL/hr at 07/02/22 0731    [Held by provider] heparin (PORCINE) Infusion Stopped (06/29/22 1131)     PRN Meds:  sodium chloride flush, sodium chloride, sodium chloride, fentanNYL, heparin (porcine), heparin (porcine), glucose, dextrose bolus **OR** dextrose bolus, glucagon (rDNA), dextrose, sodium chloride flush, sodium chloride, ondansetron **OR** ondansetron, polyethylene glycol, acetaminophen **OR** acetaminophen, heparin (porcine), heparin (porcine), albuterol sulfate HFA, morphine    Input/Output:       I/O last 3 completed shifts: In: 2225.5 [I.V.:507.5; NG/GT:1418]  Out: 3300 .       Patient Vitals for the past 96 hrs (Last 3 readings):   Weight   07/02/22 0830 156 lb 8.4 oz (71 kg)   07/01/22 1905 151 lb 10.8 oz (68.8 kg)   07/01/22 1515 158 lb 4.6 oz (71.8 kg)       Vital Signs:   Temperature:  Temp: 98.4 °F (36.9 °C)  TMax:   Temp (24hrs), Av.6 °F (37 °C), Min:96.8 °F (36 °C), Max:100.2 °F (37.9 °C)    Respirations:  Resp: 26  Pulse:   Heart Rate: 57  BP:    BP: (!) 98/40  BP Range: Systolic (37TZH), KUH:147 , Min:95 , XDC:056       Diastolic (23ISZ), BTS:56, Min:36, Max:74      Physical Examination:     General:   Sedated and on the ventilator   Neck:   Supple, dialysis catheter in place  Chest:   Bilateral air entry on ventilator. Cardiac:  S1 S2 RR, no murmurs, gallops or rubs, JVP not raised. Abdomen: Soft, non-tender, no masses or organomegaly, BS audible. :   No suprapubic or flank tenderness. Neuro:  AAO x 3, No FND. SKIN:  No rashes, good skin turgor. Extremities:  2+ peripheral edema. Labs:       Recent Labs     22  0429 22  1249 22  0546 22  1421 22  0511   WBC 8.7  --  7.8  --  6.8   RBC 2.35*  --  2.68*  --  2.65*   HGB 7.1*   < > 8.1* 8.2* 8.3*   HCT 24.1*   < > 27.3* 27.2* 26.5*   .6  --  101.9  --  100.0   MCH 30.2  --  30.2  --  31.3   MCHC 29.5  --  29.7  --  31.3   RDW 16.2*  --  16.7*  --  16.7*     --  150  --  289   MPV 9.8  --  10.7  --  11.5    < > = values in this interval not displayed.       BMP:   Recent Labs     22  0429 22  0546 22  0511    136 134*   K 4.2 5.0 4.1    102 102   CO2 26 27 25   BUN 30* 42* 24*   CREATININE 1.48* 1.93* 1.35*   GLUCOSE 105* 103* 137*   CALCIUM 7.8* 7.7* 8.1*      Phosphorus:     Recent Labs     22  0511   PHOS 2.2*     YRIS:      Lab Results   Component Value Date/Time    YRIS NEGATIVE 10/27/2020 04:37 PM     SPEP:  Lab Results   Component Value Date/Time    PROT 5.3 2022 02:59 PM    ALBCAL 2.5 2022 03:00 PM    ALBPCT 48 2022 03:00 PM    LABALPH 0.3 2022 03:00 PM    LABALPH 1.2 2022 03:00 PM    A1PCT 6 2022 03:00 PM    A2PCT 23 2022 03:00 PM    LABBETA 0.7 2022 03:00 PM    BETAPCT 13 06/01/2022 03:00 PM    GAMGLOB 0.5 06/01/2022 03:00 PM    GGPCT 10 06/01/2022 03:00 PM    PATH ELECTRONICALLY SIGNED. Tonya Sandoval M.D. 06/01/2022 03:00 PM    PATH ELECTRONICALLY SIGNED. Tonya Sandoval M.D. 06/01/2022 03:00 PM     UPEP:     Lab Results   Component Value Date/Time    LABPE  10/27/2020 04:35 PM     ELEVATED PROTEIN CONCENTRATION. MOST SERUM PROTEINS ARE DETECTED IN THIS URINE.   USUALLY OBSERVED WITH MARKEDLY INCREASED NON-SELECTIVE GLOMERULAR PERMEABILITY (i.e. SEVERE GLOMERULAR DISEASE), CONTAMINATION OF     C3:     Lab Results   Component Value Date/Time    C3 147 10/27/2020 04:37 PM     C4:     Lab Results   Component Value Date/Time    C4 29 10/27/2020 04:37 PM     MPO ANCA:     Lab Results   Component Value Date/Time    MPO 5 10/27/2020 04:37 PM     PR3 ANCA:     Lab Results   Component Value Date/Time    PR3 16 10/27/2020 04:37 PM     Hep BsAg:         Lab Results   Component Value Date/Time    HEPBSAG NONREACTIVE 06/28/2022 02:56 PM     Hep C AB:          Lab Results   Component Value Date/Time    HEPCAB NONREACTIVE 06/28/2022 02:56 PM       Urinalysis/Chemistries:      Lab Results   Component Value Date/Time    NITRU NEGATIVE 06/27/2022 06:35 PM    COLORU Dark Yellow 06/27/2022 06:35 PM    PHUR 5.5 06/27/2022 06:35 PM    WBCUA 20 TO 50 06/27/2022 06:35 PM    RBCUA 20 TO 50 06/27/2022 06:35 PM    MUCUS 1+ 06/27/2022 06:35 PM    TRICHOMONAS NOT REPORTED 02/07/2022 02:42 AM    YEAST NOT REPORTED 02/07/2022 02:42 AM    BACTERIA FEW 06/12/2022 09:33 PM    SPECGRAV 1.030 06/27/2022 06:35 PM    LEUKOCYTESUR SMALL 06/27/2022 06:35 PM    UROBILINOGEN Normal 06/27/2022 06:35 PM    [address] NEGATIVE 06/27/2022 06:35 PM    GLUCOSEU 1+ 06/27/2022 06:35 PM    KETUA TRACE 06/27/2022 06:35 PM    AMORPHOUS NOT REPORTED 02/07/2022 02:42 AM     Urine Sodium:     Lab Results   Component Value Date/Time    SLOAN <20 06/08/2022 09:52 PM       Urine Creatinine:     Lab Results Component Value Date/Time    LABCREA 63.0 06/08/2022 09:52 PM         Radiology:     CXR: Reviewed    Assessment:     1. Apparent end-stage renal disease likely secondary to progression of underlying diabetic nephropathy with the patient now anuric2. Hyperkalemia from progressive decline in kidney function resolved  3. Nephrotic range proteinuria about 20 g from diabetic nephropathy  4. Respiratory failure likely from fluid overload  5. Nonhealing lower extremity ulcers  6. Hypotension on pressors  7. Fluid overload    Plan:   1. Weight removal and dialysis orders reviewed. 2.  Continue to evaluate daily the need for dialysis  3. Plan is for tunneled hemodialysis catheter placement next week  4. Follow up labs ordered. Nutrition   Please ensure that patient is on a renal diet/TF. Avoid nephrotoxic drugs/contrast exposure. We will continue to follow along with you. Electronically signed by MAL Chowdary CNP on 7/2/2022 at 10:24 AM     Attending Physician Statement  I have discussed the care of [address] [place] [address] Drive, including pertinent history and exam findings with the CNP. I have reviewed the key elements of all parts of the encounter with the CNP. I have seen and examined the patient. I agree with the assessment and plan and status of the problem list as documented. Addiitionally I recommend likely no dialysis tomorrow and will evaluate daily the need for dialysis.     Estela Traore MD   Nephrology Attending Physician  [facility]  7/2/2022

## 2022-07-02 NOTE — PROGRESS NOTES
Critical Care Team - Daily Progress Note      Date and time: 7/2/2022 7:46 AM  Patient's name:  Nikhil Peacock  Medical Record Number: [id number]  Patient's account/billing number: [de-identified]  Patient's YOB: 1950  Age: 66 y.o.   Date of Admission: 6/27/2022 12:28 PM  Length of stay during current admission: 5      Primary Care Physician: Shila Martin MD  ICU Attending Physician: Dr. Huang Short Status: Full Code    Reason for ICU admission:   Chief Complaint   Patient presents with    Bradycardia       Subjective:     OVERNIGHT EVENTS:  No significant events overnight    Today:  -vss, normotensive off pressors, HR 60  -tmax 100.2F last 24h  -sedated on precedex @ 0.9 mcg/kg/h-does open eyes to command  -vent settings: prvc 26/350/8/30%  -I/os: net negative 1.6L/24h, -284cc since admission; HD-3.3L total removal 24h  -Blood cultures and urine cultures are negative- currently on cefepime  -na 136>134  -bg controlled 137  -wbc 6.8  -hgb stable 8.1>8.2>8.3  -heparin for PAF on hold as oozing from [facility] site-awaiting PICC line  -amiodarone on hold as barely HR in 60-61 and becomes bradycardic on amiodarone  -plan HD/CRRT again per Nephro      AWAKE & FOLLOWING COMMANDS:  [x] No (, opens eyes to verbal commands) [] Yes    CURRENT VENTILATION STATUS:     [x] Ventilator  [] BIPAP  [] Nasal Cannula [] Room Air          SECRETIONS Amount:  [] Small [] Moderate  [] Large  [x] None  Color:     [] White [] Colored  [] Bloody    SEDATION:  RAAS Score:  [] Propofol gtt  [] Versed gtt  [] Ativan gtt   [] No Sedation PRECEDEX    PARALYZED:  [x] No    [] Yes    DIARRHEA:                [x] No                [] Yes  (C. Difficile status: [] positive                                                                                                                       [] negative                                                                                                                     [] pending)    VASOPRESSORS:  [x] No    [] Yes    If yes -    [] Levophed       [] Dopamine     [] Vasopressin       [] Dobutamine  [] Phenylephrine         [] Epinephrine    CENTRAL LINES:     [] No   [x] Yes   (Date of Insertion:   )           If yes -     [x] Right IJ     [] Left IJ [] Right Femoral [] Left Femoral                   [] Right Subclavian [] Left Subclavian       GUTIERREZ'S CATHETER:   [] No   [x] Yes  (Date of Insertion:   )     URINE OUTPUT:            [] Good   [] Low              [x] Anuric    REVIEW OF SYSTEMS:  Unable to obtain due to patient mentation.      OBJECTIVE:     VITAL SIGNS:  BP (!) 116/51   Pulse 64   Temp 99.7 °F (37.6 °C) (Esophageal)   Resp 21   Ht 4' 11\" (1.499 m)   Wt 151 lb 10.8 oz (68.8 kg)   SpO2 98%   BMI 30.63 kg/m²   Tmax over 24 hours:  Temp (24hrs), Av.7 °F (37.1 °C), Min:96.8 °F (36 °C), Max:100.2 °F (37.9 °C)      Patient Vitals for the past 8 hrs:   BP Temp Temp src Pulse Resp SpO2   22 0730 (!) 116/51 -- -- 64 21 98 %   22 0715 (!) 104/55 -- -- 58 26 99 %   22 0700 113/62 -- -- 57 26 99 %   22 0645 114/62 -- -- 57 26 99 %   22 0630 (!) 114/57 -- -- 58 26 99 %   22 0615 (!) 113/58 -- -- 58 26 99 %   22 0600 (!) 111/52 -- -- 58 26 98 %   22 0545 (!) 109/57 -- -- 59 26 98 %   22 0530 (!) 109/58 -- -- 59 26 98 %   22 0522 -- -- -- -- (!) 37 --   22 0515 128/65 -- -- 63 19 98 %   22 0500 131/74 -- -- 76 22 98 %   22 0445 (!) 118/50 -- -- 67 27 99 %   22 0430 (!) 142/43 -- -- 80 25 99 %   22 0415 (!) 121/44 -- -- 67 23 98 %   22 0400 (!) 116/45 99.7 °F (37.6 °C) Esophageal 65 21 98 %   22 0345 (!) 114/42 -- -- 66 19 98 %   22 0330 (!) 137/46 -- -- 73 24 98 %   22 0321 -- -- -- 63 17 99 %   22 0315 (!) 125/47 -- -- 63 19 99 %   22 0300 (!) 119/43 -- -- 63 19 99 %   22 0245 (!) 109/36 -- -- 63 21 98 %   22 0230 (!) 117/52 -- -- 66 25 98 %   07/02/22 0215 (!) 112/49 -- -- 68 23 98 %   07/02/22 0200 (!) 112/41 100.2 °F (37.9 °C) Esophageal 69 26 98 %   07/02/22 0145 (!) 106/44 -- -- 63 21 97 %   07/02/22 0130 (!) 123/52 -- -- 73 23 97 %   07/02/22 0115 (!) 142/62 -- -- 76 26 97 %   07/02/22 0100 (!) 149/48 -- -- 78 25 98 %   07/02/22 0045 (!) 133/42 -- -- 70 21 97 %   07/02/22 0030 (!) 126/39 -- -- 70 24 97 %   07/02/22 0015 138/64 -- -- 75 22 98 %   07/02/22 0000 (!) 117/47 99.3 °F (37.4 °C) Esophageal 62 20 98 %         Intake/Output Summary (Last 24 hours) at 7/2/2022 0746  Last data filed at 7/2/2022 0731  Gross per 24 hour   Intake 1727.27 ml   Output 3300 ml   Net -1572.73 ml     Date 07/02/22 0000 - 07/02/22 2359   Shift 9289-6700 5533-5213 2174-0405 24 Hour Total   INTAKE   I.V.(mL/kg) 215.5(3.1)   215.5(3.1)   NG/GT(mL/kg) 238(3.5)   238(3.5)   Shift Total(mL/kg) 453.5(6.6)   453.5(6.6)   OUTPUT   Shift Total(mL/kg)       Weight (kg) 68.8 68.8 68.8 68.8     Wt Readings from Last 3 Encounters:   07/01/22 151 lb 10.8 oz (68.8 kg)   06/22/22 152 lb (68.9 kg)   06/21/22 152 lb (68.9 kg)     Body mass index is 30.63 kg/m². PHYSICAL EXAM:  Constitutional: Intubated, sedated, opens eyes to verbal commands  HEENT: PERRLA, EOMI, sclera clear, anicteric  Respiratory: Bilateral mechanical breath sounds  Cardiovascular: regular rate and rhythm, normal S1, S2, no murmur noted and 2+ pulses throughout  Abdomen: soft, nontender, nondistended, no masses or organomegaly  NEUROLOGIC: drowsy, but waking up to strong verbal cues, hard of hearing  Cranial nerves II-XII are grossly intact. Extremities:  peripheral pulses normal, 2+ pedal edema,.     MEDICATIONS:  Scheduled Meds:   lidocaine 1 % injection  5 mL IntraDERmal Once    sodium chloride flush  5-40 mL IntraVENous 2 times per day    midodrine  5 mg Per NG tube TID WC    sodium polystyrene  15 g Oral Once    cefepime  1,000 mg IntraVENous Q24H    [Held by provider] amiodarone  100 mg Oral Daily    rocuronium  0.6 mg/kg IntraVENous Once    albuterol  2.5 mg Nebulization Q6H    sodium chloride flush  5-40 mL IntraVENous 2 times per day    aspirin EC  81 mg Oral Daily    atorvastatin  40 mg Oral Daily    magnesium oxide  400 mg Oral BID    miconazole   Topical BID    ferrous sulfate  325 mg Oral Daily with breakfast     Continuous Infusions:   sodium chloride      dexmedetomidine 0.9 mcg/kg/hr (07/02/22 0731)    sodium chloride      DOPamine      norepinephrine Stopped (07/02/22 0516)    midazolam Stopped (06/30/22 1545)    dextrose      sodium chloride 10 mL/hr at 07/02/22 0731    [Held by provider] heparin (PORCINE) Infusion Stopped (06/29/22 1131)     PRN Meds:   sodium chloride flush, 5-40 mL, PRN  sodium chloride, 25 mL, PRN  sodium chloride, , PRN  fentanNYL, 50 mcg, Q2H PRN  heparin (porcine), 1,300 Units, PRN  heparin (porcine), 1,200 Units, PRN  glucose, 4 tablet, PRN  dextrose bolus, 125 mL, PRN   Or  dextrose bolus, 250 mL, PRN  glucagon (rDNA), 1 mg, PRN  dextrose, 100 mL/hr, PRN  sodium chloride flush, 5-40 mL, PRN  sodium chloride, , PRN  ondansetron, 4 mg, Q8H PRN   Or  ondansetron, 4 mg, Q6H PRN  polyethylene glycol, 17 g, Daily PRN  acetaminophen, 650 mg, Q6H PRN   Or  acetaminophen, 650 mg, Q6H PRN  heparin (porcine), 4,000 Units, PRN  heparin (porcine), 2,000 Units, PRN  albuterol sulfate HFA, 2 puff, Q6H PRN  morphine, 2 mg, Q4H PRN        SUPPORT DEVICES: [x] Ventilator [] BIPAP  [] Nasal Cannula [] Room Air    VENT SETTINGS (Comprehensive) (if applicable):  Vent Information  Ventilator ID: [id number]  Vent Mode: PRVC  Additional Respiratory Assessments  Heart Rate: 64  Resp: 21  SpO2: 98 %  End Tidal CO2: 37 (%)  Position: Semi-Leung's  Humidification Source: HME  Cuff Pressure (cm H2O):  (mov)    ABGs:     Lab Results   Component Value Date/Time    PHART 7.459 03/09/2022 04:28 AM    IHD0PMS 39.9 03/09/2022 04:28 AM    PO2ART 107.0 03/09/2022 04:28 AM    VKV5HTE 28.3 03/09/2022 04:28 AM    B7TKGZQO 97.4 03/09/2022 04:28 AM    FIO2 30.0 07/02/2022 04:19 AM     Lactic Acid:   Lab Results   Component Value Date/Time    LACTA 0.7 03/04/2022 02:30 PM    LACTA 1.2 02/18/2021 01:00 AM    LACTA 1.8 07/25/2020 11:20 AM         DATA:  Complete Blood Count:   Recent Labs     06/30/22  0429 06/30/22  1249 07/01/22  0546 07/01/22  1421 07/02/22  0511   WBC 8.7  --  7.8  --  6.8   HGB 7.1*   < > 8.1* 8.2* 8.3*   .6  --  101.9  --  100.0     --  150  --  289   RBC 2.35*  --  2.68*  --  2.65*   HCT 24.1*   < > 27.3* 27.2* 26.5*   MCH 30.2  --  30.2  --  31.3   MCHC 29.5  --  29.7  --  31.3   RDW 16.2*  --  16.7*  --  16.7*   MPV 9.8  --  10.7  --  11.5    < > = values in this interval not displayed. PT/INR:    Lab Results   Component Value Date/Time    PROTIME 10.8 06/14/2022 02:56 AM    INR 1.0 06/14/2022 02:56 AM     PTT:    Lab Results   Component Value Date/Time    APTT 29.7 06/29/2022 04:13 PM       Basal Metabolic Profile:   Recent Labs     06/30/22  0429 07/01/22  0546 07/02/22  0511    136 134*   K 4.2 5.0 4.1   BUN 30* 42* 24*   CREATININE 1.48* 1.93* 1.35*    102 102   CO2 26 27 25      Magnesium:   Lab Results   Component Value Date/Time    MG 2.3 06/09/2022 06:00 AM    MG 2.4 06/07/2022 09:45 PM    MG 2.3 05/18/2022 02:19 PM     Phosphorus:   Lab Results   Component Value Date/Time    PHOS 2.2 07/02/2022 05:11 AM    PHOS 4.4 06/15/2022 08:07 AM    PHOS 4.6 05/18/2022 02:19 PM     S. Calcium:  Recent Labs     07/02/22  0511   CALCIUM 8.1*     S. Ionized Calcium:No results for input(s): IONCA in the last 72 hours.       Urinalysis:   Lab Results   Component Value Date/Time    NITRU NEGATIVE 06/27/2022 06:35 PM    COLORU Dark Yellow 06/27/2022 06:35 PM    PHUR 5.5 06/27/2022 06:35 PM    WBCUA 20 TO 50 06/27/2022 06:35 PM    RBCUA 20 TO 50 06/27/2022 06:35 PM    MUCUS 1+ 06/27/2022 06:35 PM    TRICHOMONAS NOT REPORTED 02/07/2022 02:42 AM    YEAST NOT REPORTED 02/07/2022 02:42 AM    BACTERIA FEW 06/12/2022 09:33 PM    SPECGRAV 1.030 06/27/2022 06:35 PM    LEUKOCYTESUR SMALL 06/27/2022 06:35 PM    UROBILINOGEN Normal 06/27/2022 06:35 PM    BILIRUBINUR NEGATIVE 06/27/2022 06:35 PM    GLUCOSEU 1+ 06/27/2022 06:35 PM    KETUA TRACE 06/27/2022 06:35 PM    AMORPHOUS NOT REPORTED 02/07/2022 02:42 AM       CARDIAC ENZYMES: No results for input(s): CKMB, CKMBINDEX, TROPONINI in the last 72 hours. Invalid input(s): CKTOTAL;3  BNP: No results for input(s): BNP in the last 72 hours. LFTS  No results for input(s): ALKPHOS, ALT, AST, BILITOT, BILIDIR, LABALBU in the last 72 hours. AMYLASE/LIPASE/AMMONIA  No results for input(s): AMYLASE, LIPASE, AMMONIA in the last 72 hours. Last 3 Blood Glucose:   Recent Labs     06/30/22  0429 07/01/22  0546 07/02/22  0511   GLUCOSE 105* 103* 137*      HgBA1c:    Lab Results   Component Value Date/Time    LABA1C 4.6 03/31/2022 11:21 AM         TSH:    Lab Results   Component Value Date/Time    TSH 9.54 06/27/2022 12:44 PM     ANEMIA STUDIES  No results for input(s): LABIRON, TIBC, FERRITIN, BALTRHDC79, FOLATE, OCCULTBLD in the last 72 hours.       Cultures during this admission:     Blood cultures:                 [] None drawn      [x] Negative             []  Positive (Details:  )  Urine Culture:                   [] None drawn      [x] Negative             []  Positive (Details:pend  )  Sputum Culture:               [] None drawn       [] Negative             []  Positive (Details:  )   Endotracheal aspirate:     [] None drawn       [] Negative             []  Positive (Details:  )        ASSESSMENT:     Principal Problem:    Bradycardia  Active Problems:    Lymphedema    Acute kidney injury superimposed on CKD ([facility] [address]    Hyperkalemia    Shock ([facility] [address]    Leukocytosis    Acute respiratory failure with hypoxia and hypercapnia (HCC)    Pulmonary edema cardiac cause (HCC)    Acute on chronic diastolic congestive heart failure [facility])  Resolved Problems:    * No resolved hospital problems. *  Acute kidney injury versus progression of underlying kidney disease, oliguric dependent on dialysis  Hyperkalemia-resolved        PLAN:   Neurologic:   · Neuro checks per protocol  · Worsening mentatation in last two weeks before coming in per daughter. baseline status otherwise normal.   · CT of head performed in ED. Suggestive of 2.6 cm anterior right temporal meningioma with mild increase in vasogenic edema  · Previous CT's reviewed. · Neurology signed off yesterday with no new recommendations  ·   Cardiovascular:        Sinus Bradycardia   ? Sinus bradycardia with hyperkalemia and hypothermia on admission  ? Patient is currently off of the dopamine gtt. and epinephrine  ? Continue amiodarone 100 mg daily as per cardiology recommendation-currently on hold as HR in 60-61 range-will resume as HR improves/as necessary  ? Cardiology signed off  ? Continue to monitor     History of paroxysmal Atrial Fibrillation - CHADS VASC score of 6  · Patient is on Eliquis at home, hemodynamically stable, not in RVR. · Heparin is on hold due to low Hb yesterday-Hb is 8.3 today-currently heparin remains on hold as oozing at [facility] site recently-awaiting PICC line placment     Chronic Diastolic Heart Failure  · Recent echo showed EF of 60 to 65%.       Pulmonary:  COPD   Patient was recently started on home oxygen 2 to 3 L. Breathing treatments as needed. Possible concern community versus hospital-acquired pneumonia, appropriate antibiotics. Scattered infiltrates with bilateral effusions sent on chest x-ray completed this morning.     GI/Nutrition  · Glycolax PRN  · Ulcer Prophylaxis: famotidine 10 mg iv qd given mec vent>48h+on antiplatelet agent (HKF27)  · Diet:On TF     Renal/Fluid/Electrolyte  JOY on CKD Stage 4  · Creatinine 2.13 on admission with baseline around 1.7  · Most likely pre-renal secondary to decreased perfusion due to pulmonary vascular congestion. · Patient got hemodialysis 6.30.22-will get HD/CRRT today again per Nephro     ID  · Scattered infiltrates with bilateral pleural effusions present on chest x-ray this morning. Patient upon appropriate antibiotics, cefepime for hospital versus community-acquired pneumonia. We will continue to trend white count, manage fevers at present. White blood cell count trending down     Hematology:      · Recent Labs     06/27/22  1244   HGB 10.5*   · Stable. Daily CBC.      Endocrine:   ? Hx of diabetes mellitus not on any home medications  ? Most recent HbA1c 4.6 from 3/31/2022  ? Will continue to monitor-BGs controlled <180  ? On TF     DVT Prophylaxis  ? Will resume Heparin gtt for PAF once PICC line placed-likely Tuesday per nursing     F-TF  A-n/a  S-precedex  T-heparin on-hold  M-n/a  H-30 degrees  U-famotidine 10 mg qd iv  G-sugars controlled off ssi  S-not yet indicated-too drowsy  B-glycolasx prn  I-HD catheter-inserted 6.27.22-R IJm OGT-3d, ETT-3d,   D-to remain in ICU pending wean from vent    Kaylee Lamb MD,        Resident internal medicine, PGY-3  [facility])             7/2/2022, 7:46 AM    Attending Physician Statement  I have discussed the care of [address] [place] [address] Drive, including pertinent history and exam findings with the resident. I have reviewed the key elements of all parts of the encounter with the resident. I have seen and examined the patient with the resident. I agree with the assessment and plan and status of the problem list as documented. I seen the patient during around today, chart reviewed, labs and medications reviewed overnight events noted per  Patient is on Precedex drip she is lethargic but more arousable follows command intermittently not persistently. Endotracheal secretions reported to be mild to moderate. She is on Precedex drip coming down on Precedex drip.   Urine output is is minimal she had hemodialysis this morning and 2.2 L of fluid removed. So far cultures are negative she is currently on cefepime. Ventilator setting PRVC/26/350/8/30 percent ABG 7.3 6/47/97/27 and PEEP is 5 now. Creatinine is 1.35 as mentioned she had hemodialysis today. Will resume heparin drip. Will get chest x-ray tomorrow. Daily spontaneous breathing trial.  Once she is able to be more awake able to protect airways and able to mobilize secretion and tolerated spontaneous breathing trial then extubation    Discussed with daughter in detail and updated. Discussed with nursing staff, treatment and plan discussed. Discussed with respiratory therapist.    Total critical care time caring for this patient with life threatening, unstable organ failure, including direct patient contact, management of life support systems, review of data including imaging and labs, discussions with other team members and physicians at least 35 min so far today, excluding procedures. Please note that this chart was generated using voice recognition Dragon dictation software. Although every effort was made to ensure the accuracy of this automated transcription, some errors in transcription may have occurred.      Lani Deluca MD  7/2/2022 12:23 PM

## 2022-07-02 NOTE — PROGRESS NOTES
Dialysis Post Treatment Note  Vitals:    07/02/22 1120   BP: (!) 139/32   Pulse: 57   Resp: 26   Temp: 96.8 °F (36 °C)   SpO2: 100%     Pre-Weight = 71kg  Post-weight = Weight: 151 lb 10.8 oz (68.8 kg)  Total Liters Processed = Total Liters Processed (l/min): 44.2 l/min  Rinseback Volume (mL) = Rinseback Volume (ml): 300 ml  Net Removal (mL) =  2.3L  Patient's dry weight= TBD  Type of access used= CVC Right  Length of treatment=150      Pt tolerated the treatment with 2.3L taken off. Decreased UF goal from 2.8L to 2.3L due to low BP in the last 30mins of tx. Report given to unit SHERITA Simon. Left pt with daughter at bedside.

## 2022-07-02 NOTE — PLAN OF CARE
Problem: Safety - Adult  Goal: Free from fall injury  7/2/2022 1004 by Koko Vega RN  Outcome: Progressing     Problem: ABCDS Injury Assessment  Goal: Absence of physical injury  7/2/2022 1004 by Koko Vega RN  Outcome: Progressing     Problem: Respiratory - Adult  Goal: Achieves optimal ventilation and oxygenation  7/2/2022 1004 by Koko Vega RN  Outcome: Progressing     Problem: Safety - Medical Restraint  Goal: Remains free of injury from restraints (Restraint for Interference with Medical Device)  Description: INTERVENTIONS:  1. Determine that other, less restrictive measures have been tried or would not be effective before applying the restraint  2. Evaluate the patient's condition at the time of restraint application  3. Inform patient/family regarding the reason for restraint  4.  Q2H: Monitor safety, psychosocial status, comfort, nutrition and hydration  7/2/2022 1004 by Koko Vega RN  Outcome: Progressing     Problem: Nutrition Deficit:  Goal: Optimize nutritional status  7/2/2022 1004 by Koko Vega RN  Outcome: Progressing

## 2022-07-02 NOTE — PROGRESS NOTES
Dialysis Time Out  To be done by RN and tech or 2 RNs  Staff Names Dany Murray RN & Sam Multani    [x]  Identity of the patient using 2 patient identifiers  [x]  Consent for treatment  [x]  Equipment-proper machine and dialyzer  [x]  B-Hep B status  [x]  Orders- to include bath, blood flow, dialyzer, time and fluid removal  [x]  Access-Correct site and in working order  [x]  Time for patient to ask questions.

## 2022-07-02 NOTE — PLAN OF CARE
Problem: Discharge Planning  Goal: Discharge to home or other facility with appropriate resources  7/2/2022 0616 by Jade Fernández RN  Outcome: Progressing  Flowsheets (Taken 7/1/2022 2000)  Discharge to home or other facility with appropriate resources: Identify barriers to discharge with patient and caregiver  7/1/2022 1928 by Steven Shah RN  Outcome: Progressing  Flowsheets (Taken 7/1/2022 0800)  Discharge to home or other facility with appropriate resources: Identify barriers to discharge with patient and caregiver     Problem: Chronic Conditions and Co-morbidities  Goal: Patient's chronic conditions and co-morbidity symptoms are monitored and maintained or improved  Outcome: Progressing  Flowsheets (Taken 7/1/2022 2000)  Care Plan - Patient's Chronic Conditions and Co-Morbidity Symptoms are Monitored and Maintained or Improved: Monitor and assess patient's chronic conditions and comorbid symptoms for stability, deterioration, or improvement     Problem: Pain  Goal: Verbalizes/displays adequate comfort level or baseline comfort level  7/2/2022 0616 by Jade Fernández RN  Outcome: Progressing  Flowsheets  Taken 7/2/2022 0000  Verbalizes/displays adequate comfort level or baseline comfort level: Assess pain using appropriate pain scale  Taken 7/1/2022 2000  Verbalizes/displays adequate comfort level or baseline comfort level: Assess pain using appropriate pain scale  7/1/2022 1928 by Steven Shah RN  Outcome: Progressing     Problem: Skin/Tissue Integrity  Goal: Absence of new skin breakdown  Description: 1. Monitor for areas of redness and/or skin breakdown  2. Assess vascular access sites hourly  3. Every 4-6 hours minimum:  Change oxygen saturation probe site  4. Every 4-6 hours:  If on nasal continuous positive airway pressure, respiratory therapy assess nares and determine need for appliance change or resting period.   7/2/2022 0616 by Jade Fernández RN  Outcome: Progressing  7/1/2022 1928 by Tram Chen interference with medical device): Determine that other, less restrictive measures have been tried or would not be effective before applying the restraint  Taken 7/2/2022 0000  Remains free of injury from restraints (restraint for interference with medical device): Determine that other, less restrictive measures have been tried or would not be effective before applying the restraint  Taken 7/1/2022 2200  Remains free of injury from restraints (restraint for interference with medical device): Determine that other, less restrictive measures have been tried or would not be effective before applying the restraint  Taken 7/1/2022 2000  Remains free of injury from restraints (restraint for interference with medical device): Determine that other, less restrictive measures have been tried or would not be effective before applying the restraint  7/1/2022 1928 by Susu Pierson RN  Outcome: Progressing  Flowsheets  Taken 7/1/2022 1200  Remains free of injury from restraints (restraint for interference with medical device): Determine that other, less restrictive measures have been tried or would not be effective before applying the restraint  Taken 7/1/2022 1000  Remains free of injury from restraints (restraint for interference with medical device): Determine that other, less restrictive measures have been tried or would not be effective before applying the restraint  Taken 7/1/2022 0800  Remains free of injury from restraints (restraint for interference with medical device):   Determine that other, less restrictive measures have been tried or would not be effective before applying the restraint   Evaluate the patient's condition at the time of restraint application   Every 2 hours: Monitor safety, psychosocial status, comfort, nutrition and hydration     Problem: Nutrition Deficit:  Goal: Optimize nutritional status  Outcome: Progressing

## 2022-07-03 ENCOUNTER — APPOINTMENT (OUTPATIENT)
Dept: GENERAL RADIOLOGY | Age: 72
DRG: 308 | End: 2022-07-03
Payer: OTHER GOVERNMENT

## 2022-07-03 LAB
ABSOLUTE EOS #: 0.09 K/UL (ref 0–0.44)
ABSOLUTE IMMATURE GRANULOCYTE: 0.1 K/UL (ref 0–0.3)
ABSOLUTE LYMPH #: 0.74 K/UL (ref 1.1–3.7)
ABSOLUTE MONO #: 1.29 K/UL (ref 0.1–1.2)
ALLEN TEST: POSITIVE
ANION GAP SERPL CALCULATED.3IONS-SCNC: 6 MMOL/L (ref 9–17)
BASOPHILS # BLD: 0 % (ref 0–2)
BASOPHILS ABSOLUTE: <0.03 K/UL (ref 0–0.2)
BUN BLDV-MCNC: 40 MG/DL (ref 8–23)
CALCIUM SERPL-MCNC: 8 MG/DL (ref 8.6–10.4)
CHLORIDE BLD-SCNC: 103 MMOL/L (ref 98–107)
CO2: 25 MMOL/L (ref 20–31)
CREAT SERPL-MCNC: 1.78 MG/DL (ref 0.5–0.9)
EOSINOPHILS RELATIVE PERCENT: 1 % (ref 1–4)
GFR AFRICAN AMERICAN: 34 ML/MIN
GFR NON-AFRICAN AMERICAN: 28 ML/MIN
GFR SERPL CREATININE-BSD FRML MDRD: ABNORMAL ML/MIN/{1.73_M2}
GLUCOSE BLD-MCNC: 138 MG/DL (ref 74–100)
GLUCOSE BLD-MCNC: 158 MG/DL (ref 70–99)
HCT VFR BLD CALC: 26.7 % (ref 36.3–47.1)
HCT VFR BLD CALC: 28.1 % (ref 36.3–47.1)
HEMOGLOBIN: 7.9 G/DL (ref 11.9–15.1)
HEMOGLOBIN: 8.1 G/DL (ref 11.9–15.1)
IMMATURE GRANULOCYTES: 1 %
LYMPHOCYTES # BLD: 10 % (ref 24–43)
MCH RBC QN AUTO: 29.3 PG (ref 25.2–33.5)
MCHC RBC AUTO-ENTMCNC: 28.8 G/DL (ref 28.4–34.8)
MCV RBC AUTO: 101.8 FL (ref 82.6–102.9)
MONOCYTES # BLD: 17 % (ref 3–12)
NRBC AUTOMATED: 0 PER 100 WBC
O2 DEVICE/FLOW/%: NORMAL
PARTIAL THROMBOPLASTIN TIME: 37.1 SEC (ref 20.5–30.5)
PDW BLD-RTO: 16.3 % (ref 11.8–14.4)
PLATELET # BLD: 109 K/UL (ref 138–453)
PMV BLD AUTO: 9.9 FL (ref 8.1–13.5)
POC HCO3: 26.9 MMOL/L (ref 21–28)
POC O2 SATURATION: 98 % (ref 94–98)
POC PCO2: 42.2 MM HG (ref 35–48)
POC PH: 7.41 (ref 7.35–7.45)
POC PO2: 98.5 MM HG (ref 83–108)
POSITIVE BASE EXCESS, ART: 2 (ref 0–3)
POTASSIUM SERPL-SCNC: 4.1 MMOL/L (ref 3.7–5.3)
RBC # BLD: 2.76 M/UL (ref 3.95–5.11)
RBC # BLD: ABNORMAL 10*6/UL
SAMPLE SITE: NORMAL
SEG NEUTROPHILS: 70 % (ref 36–65)
SEGMENTED NEUTROPHILS ABSOLUTE COUNT: 5.21 K/UL (ref 1.5–8.1)
SODIUM BLD-SCNC: 134 MMOL/L (ref 135–144)
WBC # BLD: 7.5 K/UL (ref 3.5–11.3)

## 2022-07-03 PROCEDURE — 82947 ASSAY GLUCOSE BLOOD QUANT: CPT

## 2022-07-03 PROCEDURE — 2580000003 HC RX 258: Performed by: STUDENT IN AN ORGANIZED HEALTH CARE EDUCATION/TRAINING PROGRAM

## 2022-07-03 PROCEDURE — 2500000003 HC RX 250 WO HCPCS: Performed by: STUDENT IN AN ORGANIZED HEALTH CARE EDUCATION/TRAINING PROGRAM

## 2022-07-03 PROCEDURE — 94003 VENT MGMT INPAT SUBQ DAY: CPT

## 2022-07-03 PROCEDURE — 36415 COLL VENOUS BLD VENIPUNCTURE: CPT

## 2022-07-03 PROCEDURE — 36600 WITHDRAWAL OF ARTERIAL BLOOD: CPT

## 2022-07-03 PROCEDURE — 2700000000 HC OXYGEN THERAPY PER DAY

## 2022-07-03 PROCEDURE — 82803 BLOOD GASES ANY COMBINATION: CPT

## 2022-07-03 PROCEDURE — 6360000002 HC RX W HCPCS: Performed by: STUDENT IN AN ORGANIZED HEALTH CARE EDUCATION/TRAINING PROGRAM

## 2022-07-03 PROCEDURE — 99232 SBSQ HOSP IP/OBS MODERATE 35: CPT | Performed by: INTERNAL MEDICINE

## 2022-07-03 PROCEDURE — 80048 BASIC METABOLIC PNL TOTAL CA: CPT

## 2022-07-03 PROCEDURE — 85014 HEMATOCRIT: CPT

## 2022-07-03 PROCEDURE — 99291 CRITICAL CARE FIRST HOUR: CPT | Performed by: INTERNAL MEDICINE

## 2022-07-03 PROCEDURE — 6370000000 HC RX 637 (ALT 250 FOR IP): Performed by: STUDENT IN AN ORGANIZED HEALTH CARE EDUCATION/TRAINING PROGRAM

## 2022-07-03 PROCEDURE — 6360000002 HC RX W HCPCS: Performed by: INTERNAL MEDICINE

## 2022-07-03 PROCEDURE — 85730 THROMBOPLASTIN TIME PARTIAL: CPT

## 2022-07-03 PROCEDURE — 2000000000 HC ICU R&B

## 2022-07-03 PROCEDURE — 85018 HEMOGLOBIN: CPT

## 2022-07-03 PROCEDURE — 94640 AIRWAY INHALATION TREATMENT: CPT

## 2022-07-03 PROCEDURE — 94761 N-INVAS EAR/PLS OXIMETRY MLT: CPT

## 2022-07-03 PROCEDURE — 85025 COMPLETE CBC W/AUTO DIFF WBC: CPT

## 2022-07-03 PROCEDURE — 71045 X-RAY EXAM CHEST 1 VIEW: CPT

## 2022-07-03 RX ADMIN — SODIUM CHLORIDE, PRESERVATIVE FREE 10 MG: 5 INJECTION INTRAVENOUS at 07:58

## 2022-07-03 RX ADMIN — ATORVASTATIN CALCIUM 40 MG: 80 TABLET, FILM COATED ORAL at 07:58

## 2022-07-03 RX ADMIN — HEPARIN SODIUM 4000 UNITS: 1000 INJECTION INTRAVENOUS; SUBCUTANEOUS at 10:38

## 2022-07-03 RX ADMIN — Medication 81 MG: at 07:58

## 2022-07-03 RX ADMIN — MAGNESIUM GLUCONATE 500 MG ORAL TABLET 400 MG: 500 TABLET ORAL at 07:58

## 2022-07-03 RX ADMIN — ALBUTEROL SULFATE 2.5 MG: 2.5 SOLUTION RESPIRATORY (INHALATION) at 20:12

## 2022-07-03 RX ADMIN — FENTANYL CITRATE 50 MCG: 50 INJECTION, SOLUTION INTRAMUSCULAR; INTRAVENOUS at 04:36

## 2022-07-03 RX ADMIN — DEXMEDETOMIDINE HYDROCHLORIDE 1.1 MCG/KG/HR: 4 INJECTION, SOLUTION INTRAVENOUS at 23:34

## 2022-07-03 RX ADMIN — MAGNESIUM GLUCONATE 500 MG ORAL TABLET 400 MG: 500 TABLET ORAL at 20:35

## 2022-07-03 RX ADMIN — FERROUS SULFATE TAB EC 325 MG (65 MG FE EQUIVALENT) 325 MG: 325 (65 FE) TABLET DELAYED RESPONSE at 13:33

## 2022-07-03 RX ADMIN — SODIUM CHLORIDE, PRESERVATIVE FREE 10 ML: 5 INJECTION INTRAVENOUS at 07:59

## 2022-07-03 RX ADMIN — ALBUTEROL SULFATE 2.5 MG: 2.5 SOLUTION RESPIRATORY (INHALATION) at 03:11

## 2022-07-03 RX ADMIN — ANTI-FUNGAL POWDER MICONAZOLE NITRATE TALC FREE: 1.42 POWDER TOPICAL at 20:35

## 2022-07-03 RX ADMIN — FENTANYL CITRATE 50 MCG: 50 INJECTION, SOLUTION INTRAMUSCULAR; INTRAVENOUS at 20:38

## 2022-07-03 RX ADMIN — LEVOTHYROXINE SODIUM 25 MCG: 25 TABLET ORAL at 07:46

## 2022-07-03 RX ADMIN — DEXMEDETOMIDINE HYDROCHLORIDE 0.6 MCG/KG/HR: 4 INJECTION, SOLUTION INTRAVENOUS at 13:38

## 2022-07-03 RX ADMIN — ALBUTEROL SULFATE 2.5 MG: 2.5 SOLUTION RESPIRATORY (INHALATION) at 07:24

## 2022-07-03 RX ADMIN — HEPARIN SODIUM 12 UNITS/KG/HR: 10000 INJECTION, SOLUTION INTRAVENOUS at 10:27

## 2022-07-03 RX ADMIN — SODIUM CHLORIDE, PRESERVATIVE FREE 10 ML: 5 INJECTION INTRAVENOUS at 20:35

## 2022-07-03 RX ADMIN — ALBUTEROL SULFATE 2.5 MG: 2.5 SOLUTION RESPIRATORY (INHALATION) at 13:41

## 2022-07-03 RX ADMIN — HEPARIN SODIUM 2000 UNITS: 1000 INJECTION INTRAVENOUS; SUBCUTANEOUS at 18:39

## 2022-07-03 RX ADMIN — MIDODRINE HYDROCHLORIDE 5 MG: 5 TABLET ORAL at 07:58

## 2022-07-03 RX ADMIN — FENTANYL CITRATE 50 MCG: 50 INJECTION, SOLUTION INTRAMUSCULAR; INTRAVENOUS at 02:28

## 2022-07-03 RX ADMIN — ANTI-FUNGAL POWDER MICONAZOLE NITRATE TALC FREE: 1.42 POWDER TOPICAL at 07:58

## 2022-07-03 ASSESSMENT — PULMONARY FUNCTION TESTS
PIF_VALUE: 18
PIF_VALUE: 19
PIF_VALUE: 17
PIF_VALUE: 13
PIF_VALUE: 10
PIF_VALUE: 18
PIF_VALUE: 10
PIF_VALUE: 25

## 2022-07-03 NOTE — PLAN OF CARE
Problem: Respiratory - Adult  Goal: Achieves optimal ventilation and oxygenation  7/3/2022 0915 by Dany Grace RCP  Outcome: Progressing  7/3/2022 0730 by Dany Grace RCP  Outcome: Progressing  7/2/2022 2302 by Lorena Shirley RN  Outcome: Progressing

## 2022-07-03 NOTE — PLAN OF CARE
Problem: Respiratory - Adult  Goal: Achieves optimal ventilation and oxygenation  7/3/2022 0730 by Chinmay Farmer RCP  Outcome: Progressing  7/2/2022 2302 by Jose David Guillory RN  Outcome: Progressing

## 2022-07-03 NOTE — PLAN OF CARE
Problem: Discharge Planning  Goal: Discharge to home or other facility with appropriate resources  Outcome: Progressing     Problem: Chronic Conditions and Co-morbidities  Goal: Patient's chronic conditions and co-morbidity symptoms are monitored and maintained or improved  Outcome: Progressing     Problem: Pain  Goal: Verbalizes/displays adequate comfort level or baseline comfort level  Outcome: Progressing     Problem: Skin/Tissue Integrity  Goal: Absence of new skin breakdown  Description: 1. Monitor for areas of redness and/or skin breakdown  2. Assess vascular access sites hourly  3. Every 4-6 hours minimum:  Change oxygen saturation probe site  4. Every 4-6 hours:  If on nasal continuous positive airway pressure, respiratory therapy assess nares and determine need for appliance change or resting period. Outcome: Progressing     Problem: Safety - Adult  Goal: Free from fall injury  7/2/2022 2302 by Virginia Salvador RN  Outcome: Progressing  7/2/2022 1004 by Jasmin Mayo RN  Outcome: Progressing     Problem: ABCDS Injury Assessment  Goal: Absence of physical injury  7/2/2022 2302 by Virginia Salvador RN  Outcome: Progressing  7/2/2022 1004 by Jasmin Mayo RN  Outcome: Progressing     Problem: Respiratory - Adult  Goal: Achieves optimal ventilation and oxygenation  7/2/2022 2302 by Virginia Salvador RN  Outcome: Progressing  7/2/2022 1004 by Jasmin Mayo RN  Outcome: Progressing  7/2/2022 0929 by Lianna Jiménez RCP  Outcome: Progressing     Problem: Safety - Medical Restraint  Goal: Remains free of injury from restraints (Restraint for Interference with Medical Device)  Description: INTERVENTIONS:  1. Determine that other, less restrictive measures have been tried or would not be effective before applying the restraint  2. Evaluate the patient's condition at the time of restraint application  3. Inform patient/family regarding the reason for restraint  4.  Q2H: Monitor safety, psychosocial status, comfort, nutrition and hydration  7/2/2022 2302 by Roberta Bill, RN  Outcome: Progressing  Flowsheets  Taken 7/2/2022 2200  Remains free of injury from restraints (restraint for interference with medical device): Every 2 hours: Monitor safety, psychosocial status, comfort, nutrition and hydration  Taken 7/2/2022 2000  Remains free of injury from restraints (restraint for interference with medical device): Every 2 hours: Monitor safety, psychosocial status, comfort, nutrition and hydration  7/2/2022 1004 by Jonh Glass RN  Outcome: Progressing     Problem: Nutrition Deficit:  Goal: Optimize nutritional status  7/2/2022 2302 by Roberta Bill RN  Outcome: Progressing  7/2/2022 1004 by Jonh Glass RN  Outcome: Progressing

## 2022-07-03 NOTE — PROGRESS NOTES
Critical Care Team - Daily Progress Note      Date and time: 7/3/2022 8:44 AM  Patient's name:  Angel Friedman  Medical Record Number: [id number]  Patient's account/billing number: [de-identified]  Patient's YOB: 1950  Age: 66 y.o. Date of Admission: 6/27/2022 12:28 PM  Length of stay during current admission: 6      Primary Care Physician: Josephine Rodriguez MD  ICU Attending Physician: Dr. El Colon Status: Full Code    Reason for ICU admission:   Chief Complaint   Patient presents with    Bradycardia       Subjective:     OVERNIGHT EVENTS:  No significant events overnight    Today:  Hemodynamically stable, afebrile overnight ,off pressors   Continue to remain ointubated and sedated with precedex,coming down,currently on 0.2 . SBT ongoing   -vent settings: 26/350/8/30%. Unchanged from yesterd   abg pH 7.413/42.2/98.5/26.9  -Did get dialysis yesterday with 2.3 l removed   -Blood cultures and urine cultures are negative- cefepime monitored off   -na 218>049>104  -wbc 6.8>7.5  -hgb stable 8.1>8.2>8.3>7.8>8.1   -heparin for PAF on hold as oozing from [place] site-awaiting PICC line??????  -amiodarone on hold as barely HR in 60-61 and becomes bradycardic on amiodarone  -nephro on board       Plan :  · restart heparin ? ? why picc line,off antibiotics/pressors  · SBT      AWAKE & FOLLOWING COMMANDS:  [x] No (, opens eyes to verbal commands) [] Yes    CURRENT VENTILATION STATUS:     [x] Ventilator  [] BIPAP  [] Nasal Cannula [] Room Air          SECRETIONS Amount:  [] Small [] Moderate  [] Large  [x] None  Color:     [] White [] Colored  [] Bloody    SEDATION:  RAAS Score:  [] Propofol gtt  [] Versed gtt  [] Ativan gtt   [] No Sedation PRECEDEX    PARALYZED:  [x] No    [] Yes    DIARRHEA:                [x] No                [] Yes  (C. Difficile status: [] positive                                                                                                                       [] negative [] pending)    VASOPRESSORS:  [x] No    [] Yes    If yes -    [] Levophed       [] Dopamine     [] Vasopressin       [] Dobutamine  [] Phenylephrine         [] Epinephrine    CENTRAL LINES:     [] No   [x] Yes   (Date of Insertion:   )           If yes -     [x] Right IJ     [] Left IJ [] Right Femoral [] Left Femoral                   [] Right Subclavian [] Left Subclavian       GUTIERREZ'S CATHETER:   [] No   [x] Yes  (Date of Insertion:   )     URINE OUTPUT:            [] Good   [] Low              [x] Anuric    REVIEW OF SYSTEMS:  Unable to obtain due to patient mentation.      OBJECTIVE:     VITAL SIGNS:  BP (!) 129/36   Pulse 73   Temp 97.5 °F (36.4 °C)   Resp 22   Ht 4' 11\" (1.499 m)   Wt 149 lb 7.6 oz (67.8 kg)   SpO2 99%   BMI 30.19 kg/m²   Tmax over 24 hours:  Temp (24hrs), Av.5 °F (36.4 °C), Min:96.6 °F (35.9 °C), Max:99 °F (37.2 °C)      Patient Vitals for the past 8 hrs:   BP Temp Temp src Pulse Resp SpO2 Weight   22 0726 -- -- -- 73 22 99 % --   22 0700 (!) 129/36 -- -- 66 26 100 % --   22 0616 -- -- -- 62 26 100 % --   22 0600 (!) 125/36 97.5 °F (36.4 °C) -- 62 16 100 % 149 lb 7.6 oz (67.8 kg)   22 0506 -- -- -- -- 26 -- --   22 0500 (!) 128/48 -- -- 63 24 98 % --   22 0400 (!) 112/46 97 °F (36.1 °C) Esophageal 60 26 98 % --   22 0311 -- -- -- 56 26 98 % --   22 0300 105/60 -- -- 56 26 98 % --   22 0258 -- -- -- -- 26 -- --   22 0200 128/60 (!) 96.6 °F (35.9 °C) -- 56 26 99 % --   22 0100 (!) 131/48 -- -- 58 24 100 % --         Intake/Output Summary (Last 24 hours) at 7/3/2022 0844  Last data filed at 7/3/2022 0800  Gross per 24 hour   Intake 1577.21 ml   Output 2600 ml   Net -1022.79 ml     Date 22 0000 - 22 2359   Shift 4281-9714 8550-0761 1152-7805 24 Hour Total   INTAKE   I.V.(mL/kg) 186.4(2.7)   186.4(2.7) NG/GT(mL/kg) 333(4.9) 137(2)  470(6.9)   Shift Total(mL/kg) 519.4(7.7) 137(2)  656.4(9.7)   OUTPUT   Shift Total(mL/kg)       Weight (kg) 67.8 67.8 67.8 67.8     Wt Readings from Last 3 Encounters:   07/03/22 149 lb 7.6 oz (67.8 kg)   06/22/22 152 lb (68.9 kg)   06/21/22 152 lb (68.9 kg)     Body mass index is 30.19 kg/m². PHYSICAL EXAM:  Constitutional: Intubated, sedated, opens eyes to verbal commands  HEENT: PERRLA, EOMI, sclera clear, anicteric  Respiratory: Bilateral mechanical breath sounds  Cardiovascular: regular rate and rhythm, normal S1, S2, no murmur noted and 2+ pulses throughout  Abdomen: soft, nontender, nondistended, no masses or organomegaly  NEUROLOGIC: drowsy, but waking up to strong verbal cues, hard of hearing  Cranial nerves II-XII are grossly intact. Extremities:  peripheral pulses normal, 2+ pedal edema,.     MEDICATIONS:  Scheduled Meds:   famotidine (PEPCID) injection  10 mg IntraVENous Daily    levothyroxine  25 mcg Oral Daily    lidocaine 1 % injection  5 mL IntraDERmal Once    sodium chloride flush  5-40 mL IntraVENous 2 times per day    midodrine  5 mg Per NG tube TID WC    sodium polystyrene  15 g Oral Once    [Held by provider] amiodarone  100 mg Oral Daily    rocuronium  0.6 mg/kg IntraVENous Once    albuterol  2.5 mg Nebulization Q6H    sodium chloride flush  5-40 mL IntraVENous 2 times per day    aspirin EC  81 mg Oral Daily    atorvastatin  40 mg Oral Daily    magnesium oxide  400 mg Oral BID    miconazole   Topical BID    ferrous sulfate  325 mg Oral Daily with breakfast     Continuous Infusions:   sodium chloride      dexmedetomidine 0.2 mcg/kg/hr (07/03/22 0645)    sodium chloride      DOPamine      norepinephrine Stopped (07/02/22 0516)    midazolam Stopped (06/30/22 1545)    dextrose      sodium chloride 10 mL/hr at 07/03/22 0645    heparin (PORCINE) Infusion Stopped (06/29/22 1131)     PRN Meds:   sodium chloride flush, 5-40 mL, PRN  sodium amiodarone 100 mg daily as per cardiology recommendation-currently on hold as HR in 60-61 range-will resume as HR improves/as necessary  ? Cardiology signed off  ? Continue to monitor     History of paroxysmal Atrial Fibrillation - CHADS VASC score of 6  · Patient is on Eliquis at home, hemodynamically stable, not in RVR. · Heparin is on hold due to low Hb yesterday-Hb is 8.3 today-currently heparin remains on hold as oozing at [place] site recently-awaiting PICC line placment? ?     Chronic Diastolic Heart Failure  · Recent echo showed EF of 60 to 65%.       Pulmonary:  COPD   Patient was recently started on home oxygen 2 to 3 L. Breathing treatments as needed. Possible concern community versus hospital-acquired pneumonia, appropriate antibiotics. Scattered infiltrates with bilateral effusions sent on chest x-ray completed this morning. MONITOR OFF ABX     GI/Nutrition  · Glycolax PRN  · Ulcer Prophylaxis: famotidine 10 mg iv qd given mec vent>48h+on antiplatelet agent (FLO89)  · Diet:On TF     Renal/Fluid/Electrolyte  JOY on CKD Stage 4  · Creatinine 2.13 on admission with baseline around 1.7  · Most likely pre-renal secondary to decreased perfusion due to pulmonary vascular congestion. · Patient got 2 session of HD  On 30th and 2nd  ,NEPHRO on board .      ID  · Scattered infiltrates with bilateral pleural effusions present on chest x-ray this morning. Patient upon appropriate antibiotics, cefepime for hospital versus community-acquired pneumonia. We will continue to trend white count, manage fevers at present. White blood cell count trending down  · 7/3/2022 monitor off antibiotics       Hematology:      · Recent Labs     06/27/22  1244   HGB 10.5*   · Stable. Daily CBC.      Endocrine:   ? Hx of diabetes mellitus not on any home medications  ? Most recent HbA1c 4.6 from 3/31/2022  ? Will continue to monitor-BGs controlled <180  ? On TF     DVT Prophylaxis  ?  Will resume Heparin gtt for PAF once PICC line placed-likely Tuesday per nursing? ??     F-TF  A-n/a  S-precedex  T-heparin on-hold  M-n/a  H-30 degrees  U-famotidine 10 mg qd iv  G-sugars controlled off ssi  S-not yet indicated-too drowsy  B-glycolasx prn  I-HD catheter-inserted 6.27.22-R IJm OGT-3d, ETT-3d,   D-to remain in ICU pending wean from vent    Abid Viridiana Arreola MD,        Resident internal medicine, PGY-3  [facility] ([address])             7/3/2022, 8:44 AM    Attending Physician Statement  I have discussed the care of [address] [place] [address] Drive, including pertinent history and exam findings with the resident. I have reviewed the key elements of all parts of the encounter with the resident. I have seen and examined the patient with the resident. I agree with the assessment and plan and status of the problem list as documented.    : The patient during around today, chart reviewed, labs and medications reviewed overnight events noted. There is no overall change in neurological status/mentation. She is maintaining blood pressure not on pressors. She did have hemodialysis yesterday with apparent removal of 2.3 kg. She is currently off cefepime. Creatinine is 1.79. Hemoglobin is stable at 8.1. Patient has not been started on heparin drip will start heparin drip and monitor for any signs of bleeding no current oozing from the site of dialysis catheter. Ventilator setting PRVC/26/350/8/30 percent ABG 7.4 1/42/28/27. Did not tolerate spontaneous breathing trial today we will do daily spontaneous breathing trial.  Continue to monitor mentation neurological status  Continue with Precedex drip and wean Precedex drip down. Discussed with ICU nursing staff, treatment and plan discussed.     Total critical care time caring for this patient with life threatening, unstable organ failure, including direct patient contact, management of life support systems, review of data including imaging and labs, discussions with other team members and physicians at least 28 Min so far today, excluding procedures. Please note that this chart was generated using voice recognition Dragon dictation software. Although every effort was made to ensure the accuracy of this automated transcription, some errors in transcription may have occurred.      Reymundo Stallworth MD  7/3/2022 10:19 AM

## 2022-07-03 NOTE — PLAN OF CARE
Problem: Discharge Planning  Goal: Discharge to home or other facility with appropriate resources  Outcome: Progressing     Problem: Chronic Conditions and Co-morbidities  Goal: Patient's chronic conditions and co-morbidity symptoms are monitored and maintained or improved  Outcome: Progressing     Problem: Pain  Goal: Verbalizes/displays adequate comfort level or baseline comfort level  Outcome: Progressing     Problem: Skin/Tissue Integrity  Goal: Absence of new skin breakdown  Description: 1. Monitor for areas of redness and/or skin breakdown  2. Assess vascular access sites hourly  3. Every 4-6 hours minimum:  Change oxygen saturation probe site  4. Every 4-6 hours:  If on nasal continuous positive airway pressure, respiratory therapy assess nares and determine need for appliance change or resting period. Outcome: Progressing     Problem: Safety - Adult  Goal: Free from fall injury  Outcome: Progressing     Problem: ABCDS Injury Assessment  Goal: Absence of physical injury  Outcome: Progressing     Problem: Respiratory - Adult  Goal: Achieves optimal ventilation and oxygenation  7/3/2022 1442 by Brian Abrams RN  Outcome: Progressing  7/3/2022 0915 by Mariah Molina RCP  Outcome: Progressing  7/3/2022 0730 by Mariah Molina RCP  Outcome: Progressing     Problem: Safety - Medical Restraint  Goal: Remains free of injury from restraints (Restraint for Interference with Medical Device)  Description: INTERVENTIONS:  1. Determine that other, less restrictive measures have been tried or would not be effective before applying the restraint  2. Evaluate the patient's condition at the time of restraint application  3. Inform patient/family regarding the reason for restraint  4.  Q2H: Monitor safety, psychosocial status, comfort, nutrition and hydration  Outcome: Progressing  Flowsheets  Taken 7/3/2022 0400 by Kassandra Mendez RN  Remains free of injury from restraints (restraint for interference with medical device): Every 2 hours: Monitor safety, psychosocial status, comfort, nutrition and hydration  Taken 7/3/2022 0200 by Kamille Corrales RN  Remains free of injury from restraints (restraint for interference with medical device): Every 2 hours: Monitor safety, psychosocial status, comfort, nutrition and hydration     Problem: Nutrition Deficit:  Goal: Optimize nutritional status  Outcome: Progressing

## 2022-07-03 NOTE — PROGRESS NOTES
Renal Progress Note    Patient :  Unique Barajas; 66 y.o. MRN# [id number]  Location:  3020/3020-01  Attending:  Richie Nj MD  Admit Date:  6/27/2022   Hospital Day: 6      Subjective:     Patient was seen and examined. She continues to be intubated and sedated. Her daughter is at bedside. Her Precedex has been decreased, When talking to her she will open her eyes and look toward you. Her blood pressures are staying in the 865K systolic  Patient received hemodialysis via temporary catheter yesterday. Her goal was reduced from 2.8 to 2.3 L due to hypotension. Yesterday was her fifth HD treatment. tube feed infusing  She did not have urine output yesterday. Today her bladder scan showed 40 mL. Chest x-ray 7/3/2022:  Impression:     Mild increased perihilar edema, mild increased perihilar opacification,   likely edema and CHF.  Persistent small effusions and bibasilar atelectasis.       Labs: Sodium 134, potassium 4.1, chloride 103, bicarb 25, creatinine 1.70, calcium 8.0, hemoglobin 8.1    Outpatient Medications:     Medications Prior to Admission: ferrous sulfate (IRON 325) 325 (65 Fe) MG tablet, Take 1 tablet by mouth daily (with breakfast)  apixaban (ELIQUIS) 2.5 MG TABS tablet, Take 1 tablet by mouth 2 times daily  apixaban (ELIQUIS) 2.5 MG TABS tablet, Take 1 tablet by mouth 2 times daily  ferrous sulfate (IRON 325) 325 (65 Fe) MG tablet, Take 1 tablet by mouth daily (with breakfast)  fluticasone-salmeterol (ADVAIR HFA) 115-21 MCG/ACT inhaler, Inhale 2 puffs into the lungs 2 times daily  metoprolol tartrate (LOPRESSOR) 25 MG tablet, Take 1 tablet by mouth 2 times daily  aspirin EC 81 MG EC tablet, Take 1 tablet by mouth daily  hydrALAZINE (APRESOLINE) 25 MG tablet, Take 1 tablet by mouth 3 times daily  albuterol sulfate HFA (PROAIR HFA) 108 (90 Base) MCG/ACT inhaler, Inhale 2 puffs into the lungs every 6 hours as needed for Wheezing or Shortness of Breath (cough)  metoprolol tartrate (LOPRESSOR) 25 MG tablet, Take 1 tablet by mouth 2 times daily  aspirin EC 81 MG EC tablet, Take 1 tablet by mouth daily  hydrALAZINE (APRESOLINE) 25 MG tablet, Take 1 tablet by mouth 3 times daily  albuterol sulfate HFA (PROAIR HFA) 108 (90 Base) MCG/ACT inhaler, Inhale 2 puffs into the lungs every 6 hours as needed for Wheezing or Shortness of Breath (cough)  fluticasone-salmeterol (ADVAIR HFA) 115-21 MCG/ACT inhaler, Inhale 2 puffs into the lungs 2 times daily Maintenance inhaler  amLODIPine (NORVASC) 10 MG tablet, Take 1 tablet by mouth nightly  Blood Pressure KIT, Diagnosis: HTN. Needs to check blood pressure 1-2 times a day until stable, then once a day. Goal blood pressure less than 135/85, and above 110/60. vitamin D (ERGOCALCIFEROL) 1.25 MG (11280 UT) CAPS capsule, Take 1 capsule by mouth once a week Sundays  amiodarone (CORDARONE) 200 MG tablet, Take 1 tablet by mouth daily  Nebulizers (COMPRESSOR/NEBULIZER) MISC, Nebulizer with compressor. Disposable Med Nebs 2 /month. Reusable Med Nebs 1 per 6 months. Aerosol Mask 1 per month. Replacement Filters 2 per month. All other related supplies as needed per month. Medications being used: Albuterol . 083 unit dose vial. Frequency: every 6 hrs x 4/day . Length of Need: 1 (Patient not taking: Reported on 6/17/2022)  albuterol (PROVENTIL) (2.5 MG/3ML) 0.083% nebulizer solution, Take 3 mLs by nebulization every 6 hours as needed for Wheezing or Shortness of Breath (Patient not taking: Reported on 6/17/2022)  Misc.  Devices (ADJUST FOLD CANE/YORK HANDLE) MISC, Use daily for walking  Handicap Placard MISC, by Does not apply route Expires on 12/30/25  atorvastatin (LIPITOR) 40 MG tablet, Take 1 tablet by mouth once daily  desloratadine (CLARINEX) 5 MG tablet, Take 1 tablet by mouth once daily  FreeStyle Lancets MISC, 1 each by Does not apply route daily Patient needs to contact office before any further refills will be approved  magnesium oxide (MAG-OX) 400 (241.3 Mg) MG TABS tablet, Take 1 tablet by mouth twice daily  miconazole (MICOTIN) 2 % powder, Apply topically 2 times daily UNDER THE SKIN FOLDS LONG TERM  Multiple Vitamins-Minerals (THERAPEUTIC MULTIVITAMIN-MINERALS) tablet, Take 1 tablet by mouth daily  blood glucose test strips (FREESTYLE LITE) strip, 1 each by In Vitro route daily As needed.   Blood Pressure KIT, 1 kit by Does not apply route three times daily (Patient not taking: Reported on 6/17/2022)  blood glucose monitor kit and supplies, 1 kit by Other route three times daily Dispense Butterfly Elite CBG Device    Current Medications:     Scheduled Meds:    famotidine (PEPCID) injection  10 mg IntraVENous Daily    levothyroxine  25 mcg Oral Daily    lidocaine 1 % injection  5 mL IntraDERmal Once    sodium chloride flush  5-40 mL IntraVENous 2 times per day    midodrine  5 mg Per NG tube TID WC    sodium polystyrene  15 g Oral Once    [Held by provider] amiodarone  100 mg Oral Daily    rocuronium  0.6 mg/kg IntraVENous Once    albuterol  2.5 mg Nebulization Q6H    sodium chloride flush  5-40 mL IntraVENous 2 times per day    aspirin EC  81 mg Oral Daily    atorvastatin  40 mg Oral Daily    magnesium oxide  400 mg Oral BID    miconazole   Topical BID    ferrous sulfate  325 mg Oral Daily with breakfast     Continuous Infusions:    sodium chloride      dexmedetomidine 0.2 mcg/kg/hr (07/03/22 0645)    sodium chloride      DOPamine      norepinephrine Stopped (07/02/22 0516)    midazolam Stopped (06/30/22 1545)    dextrose      sodium chloride 10 mL/hr at 07/03/22 0645    heparin (PORCINE) Infusion 12 Units/kg/hr (07/03/22 1027)     PRN Meds:  sodium chloride flush, sodium chloride, sodium chloride, fentanNYL, heparin (porcine), heparin (porcine), glucose, dextrose bolus **OR** dextrose bolus, glucagon (rDNA), dextrose, sodium chloride flush, sodium chloride, ondansetron **OR** ondansetron, polyethylene glycol, acetaminophen **OR** acetaminophen, heparin (porcine), heparin (porcine), albuterol sulfate HFA, morphine    Input/Output:       I/O last 3 completed shifts: In: 2648.2 [I.V.:776.2; NG/GT:1272]  Out: 5900 . Patient Vitals for the past 96 hrs (Last 3 readings):   Weight   22 0600 149 lb 7.6 oz (67.8 kg)   22 1120 151 lb 10.8 oz (68.8 kg)   22 0830 156 lb 8.4 oz (71 kg)       Vital Signs:   Temperature:  Temp: 98.2 °F (36.8 °C)  TMax:   Temp (24hrs), Av.6 °F (36.4 °C), Min:96.6 °F (35.9 °C), Max:99 °F (37.2 °C)    Respirations:  Resp: 22  Pulse:   Heart Rate: 95  BP:    BP: (!) 129/36  BP Range: Systolic (76SFD), PIQ:668 , Min:105 , IFL:547       Diastolic (83UCX), PAC:31, Min:32, Max:60      Physical Examination:     General:  Sedated and on the ventilator, following with eyes   Neck:   Supple, dialysis catheter in place  Chest:   Bilateral air entry on ventilator. Cardiac:  S1 S2 RR, no murmurs, gallops or rubs, JVP not raised. Abdomen: Soft, non-tender, no masses or organomegaly, BS audible. :   No suprapubic or flank tenderness. Neuro:  AAO x 3, No FND. SKIN:  No rashes, good skin turgor. Extremities:  Lower legs wrapped, 2+ peripheral edema. Labs:       Recent Labs     22  0546 22  1421 22  0511 22  1737 22  0435   WBC 7.8  --  6.8  --  7.5   RBC 2.68*  --  2.65*  --  2.76*   HGB 8.1*   < > 8.3* 7.8* 8.1*   HCT 27.3*   < > 26.5* 25.1* 28.1*   .9  --  100.0  --  101.8   MCH 30.2  --  31.3  --  29.3   MCHC 29.7  --  31.3  --  28.8   RDW 16.7*  --  16.7*  --  16.3*     --  289  --  109*   MPV 10.7  --  11.5  --  9.9    < > = values in this interval not displayed.       BMP:   Recent Labs     22  0546 22  0511 22  0435    134* 134*   K 5.0 4.1 4.1    102 103   CO2 27 25 25   BUN 42* 24* 40*   CREATININE 1.93* 1.35* 1.78*   GLUCOSE 103* 137* 158*   CALCIUM 7.7* 8.1* 8.0*      Phosphorus:     Recent Labs     22   PHOS 2.2*     YRIS:      Lab Results Component Value Date/Time    YRIS NEGATIVE 10/27/2020 04:37 PM     SPEP:  Lab Results   Component Value Date/Time    PROT 5.3 06/27/2022 02:59 PM    ALBCAL 2.5 06/01/2022 03:00 PM    ALBPCT 48 06/01/2022 03:00 PM    LABALPH 0.3 06/01/2022 03:00 PM    LABALPH 1.2 06/01/2022 03:00 PM    A1PCT 6 06/01/2022 03:00 PM    A2PCT 23 06/01/2022 03:00 PM    LABBETA 0.7 06/01/2022 03:00 PM    BETAPCT 13 06/01/2022 03:00 PM    GAMGLOB 0.5 06/01/2022 03:00 PM    GGPCT 10 06/01/2022 03:00 PM    PATH ELECTRONICALLY SIGNED. Frankie Wellington M.D. 06/01/2022 03:00 PM    PATH ELECTRONICALLY SIGNED. Frankie Wellington M.D. 06/01/2022 03:00 PM     UPEP:     Lab Results   Component Value Date/Time    LABPE  10/27/2020 04:35 PM     ELEVATED PROTEIN CONCENTRATION. MOST SERUM PROTEINS ARE DETECTED IN THIS URINE.   USUALLY OBSERVED WITH MARKEDLY INCREASED NON-SELECTIVE GLOMERULAR PERMEABILITY (i.e. SEVERE GLOMERULAR DISEASE), CONTAMINATION OF     C3:     Lab Results   Component Value Date/Time    C3 147 10/27/2020 04:37 PM     C4:     Lab Results   Component Value Date/Time    C4 29 10/27/2020 04:37 PM     MPO ANCA:     Lab Results   Component Value Date/Time    MPO 5 10/27/2020 04:37 PM     PR3 ANCA:     Lab Results   Component Value Date/Time    PR3 16 10/27/2020 04:37 PM     Hep BsAg:         Lab Results   Component Value Date/Time    HEPBSAG NONREACTIVE 06/28/2022 02:56 PM     Hep C AB:          Lab Results   Component Value Date/Time    HEPCAB NONREACTIVE 06/28/2022 02:56 PM       Urinalysis/Chemistries:      Lab Results   Component Value Date/Time    NITRU NEGATIVE 06/27/2022 06:35 PM    COLORU Dark Yellow 06/27/2022 06:35 PM    PHUR 5.5 06/27/2022 06:35 PM    WBCUA 20 TO 50 06/27/2022 06:35 PM    RBCUA 20 TO 50 06/27/2022 06:35 PM    MUCUS 1+ 06/27/2022 06:35 PM    TRICHOMONAS NOT REPORTED 02/07/2022 02:42 AM    YEAST NOT REPORTED 02/07/2022 02:42 AM    BACTERIA FEW 06/12/2022 09:33 PM    SPECGRAV 1.030 06/27/2022 06:35 PM LEUKOCYTESUR SMALL 06/27/2022 06:35 PM    UROBILINOGEN Normal 06/27/2022 06:35 PM    BILIRUBINUR NEGATIVE 06/27/2022 06:35 PM    GLUCOSEU 1+ 06/27/2022 06:35 PM    KETUA TRACE 06/27/2022 06:35 PM    AMORPHOUS NOT REPORTED 02/07/2022 02:42 AM     Urine Sodium:     Lab Results   Component Value Date/Time    SLOAN <20 06/08/2022 09:52 PM       Urine Creatinine:     Lab Results   Component Value Date/Time    LABCREA 63.0 06/08/2022 09:52 PM         Radiology:     CXR: Reviewed    Assessment:     1. Apparent end-stage renal disease likely secondary to progression of underlying diabetic nephropathy with the patient now anuric  2. Hyperkalemia from progressive decline in kidney function resolved  3. Nephrotic range proteinuria about 20 g from diabetic nephropathy  4. Respiratory failure likely from fluid overload  5. Nonhealing lower extremity ulcers  6. Hypotension on pressors  7. Fluid overload    Plan:   1. Hold dialysis today, will likely need dialysis tomorrow. 2.  Continue to evaluate daily the need for dialysis  3. Plan is for tunneled hemodialysis catheter placement this coming week  4. Follow up labs ordered. Nutrition   Please ensure that patient is on a renal diet/TF. Avoid nephrotoxic drugs/contrast exposure. We will continue to follow along with you. Electronically signed by MAL Olguin CNP on 7/3/2022 at 11:11 AM     Attending Physician Statement  I have discussed the care of [address] [place] [address] Drive, including pertinent history and exam findings with the CNP. I have reviewed the key elements of all parts of the encounter with the CNP. I have seen and examined the patient. I agree with the assessment and plan and status of the problem list as documented.        Lore Vicente MD   Nephrology Attending Physician  [facility]  7/3/2022

## 2022-07-04 LAB
ABSOLUTE EOS #: 0.07 K/UL (ref 0–0.44)
ABSOLUTE IMMATURE GRANULOCYTE: 0.14 K/UL (ref 0–0.3)
ABSOLUTE LYMPH #: 0.57 K/UL (ref 1.1–3.7)
ABSOLUTE MONO #: 1.21 K/UL (ref 0.1–1.2)
ALLEN TEST: POSITIVE
AMMONIA: 20 UMOL/L (ref 11–51)
ANION GAP SERPL CALCULATED.3IONS-SCNC: 7 MMOL/L (ref 9–17)
BASOPHILS # BLD: 1 % (ref 0–2)
BASOPHILS ABSOLUTE: 0.07 K/UL (ref 0–0.2)
BUN BLDV-MCNC: 51 MG/DL (ref 8–23)
CALCIUM SERPL-MCNC: 7.9 MG/DL (ref 8.6–10.4)
CHLORIDE BLD-SCNC: 97 MMOL/L (ref 98–107)
CO2: 26 MMOL/L (ref 20–31)
CREAT SERPL-MCNC: 2.1 MG/DL (ref 0.5–0.9)
EOSINOPHILS RELATIVE PERCENT: 1 % (ref 1–4)
FIO2: 40
GFR AFRICAN AMERICAN: 28 ML/MIN
GFR NON-AFRICAN AMERICAN: 23 ML/MIN
GFR SERPL CREATININE-BSD FRML MDRD: ABNORMAL ML/MIN/{1.73_M2}
GLUCOSE BLD-MCNC: 145 MG/DL (ref 74–100)
GLUCOSE BLD-MCNC: 169 MG/DL (ref 70–99)
HCT VFR BLD CALC: 25 % (ref 36.3–47.1)
HCT VFR BLD CALC: 25.6 % (ref 36.3–47.1)
HEMOGLOBIN: 7.5 G/DL (ref 11.9–15.1)
HEMOGLOBIN: 7.6 G/DL (ref 11.9–15.1)
IMMATURE GRANULOCYTES: 2 %
LYMPHOCYTES # BLD: 8 % (ref 24–43)
MCH RBC QN AUTO: 30.2 PG (ref 25.2–33.5)
MCHC RBC AUTO-ENTMCNC: 30 G/DL (ref 28.4–34.8)
MCV RBC AUTO: 100.8 FL (ref 82.6–102.9)
MODE: ABNORMAL
MONOCYTES # BLD: 17 % (ref 3–12)
MORPHOLOGY: ABNORMAL
MORPHOLOGY: ABNORMAL
NRBC AUTOMATED: 0 PER 100 WBC
O2 DEVICE/FLOW/%: ABNORMAL
PARTIAL THROMBOPLASTIN TIME: 25 SEC (ref 20.5–30.5)
PARTIAL THROMBOPLASTIN TIME: 46.7 SEC (ref 20.5–30.5)
PARTIAL THROMBOPLASTIN TIME: 61.2 SEC (ref 20.5–30.5)
PARTIAL THROMBOPLASTIN TIME: >120 SEC (ref 20.5–30.5)
PDW BLD-RTO: 16.4 % (ref 11.8–14.4)
PLATELET # BLD: 109 K/UL (ref 138–453)
PMV BLD AUTO: 10.2 FL (ref 8.1–13.5)
POC HCO3: 28 MMOL/L (ref 21–28)
POC O2 SATURATION: 98 % (ref 94–98)
POC PCO2: 38.6 MM HG (ref 35–48)
POC PH: 7.47 (ref 7.35–7.45)
POC PO2: 93.1 MM HG (ref 83–108)
POSITIVE BASE EXCESS, ART: 4 (ref 0–3)
POTASSIUM SERPL-SCNC: 3.8 MMOL/L (ref 3.7–5.3)
RBC # BLD: 2.48 M/UL (ref 3.95–5.11)
SAMPLE SITE: ABNORMAL
SEG NEUTROPHILS: 71 % (ref 36–65)
SEGMENTED NEUTROPHILS ABSOLUTE COUNT: 5.04 K/UL (ref 1.5–8.1)
SODIUM BLD-SCNC: 130 MMOL/L (ref 135–144)
WBC # BLD: 7.1 K/UL (ref 3.5–11.3)

## 2022-07-04 PROCEDURE — 6360000002 HC RX W HCPCS: Performed by: STUDENT IN AN ORGANIZED HEALTH CARE EDUCATION/TRAINING PROGRAM

## 2022-07-04 PROCEDURE — 82947 ASSAY GLUCOSE BLOOD QUANT: CPT

## 2022-07-04 PROCEDURE — 90935 HEMODIALYSIS ONE EVALUATION: CPT

## 2022-07-04 PROCEDURE — 6370000000 HC RX 637 (ALT 250 FOR IP): Performed by: STUDENT IN AN ORGANIZED HEALTH CARE EDUCATION/TRAINING PROGRAM

## 2022-07-04 PROCEDURE — 6360000002 HC RX W HCPCS: Performed by: INTERNAL MEDICINE

## 2022-07-04 PROCEDURE — 94640 AIRWAY INHALATION TREATMENT: CPT

## 2022-07-04 PROCEDURE — 2580000003 HC RX 258: Performed by: STUDENT IN AN ORGANIZED HEALTH CARE EDUCATION/TRAINING PROGRAM

## 2022-07-04 PROCEDURE — 80048 BASIC METABOLIC PNL TOTAL CA: CPT

## 2022-07-04 PROCEDURE — 82803 BLOOD GASES ANY COMBINATION: CPT

## 2022-07-04 PROCEDURE — 94003 VENT MGMT INPAT SUBQ DAY: CPT

## 2022-07-04 PROCEDURE — 2000000000 HC ICU R&B

## 2022-07-04 PROCEDURE — 36600 WITHDRAWAL OF ARTERIAL BLOOD: CPT

## 2022-07-04 PROCEDURE — 85014 HEMATOCRIT: CPT

## 2022-07-04 PROCEDURE — 36415 COLL VENOUS BLD VENIPUNCTURE: CPT

## 2022-07-04 PROCEDURE — 37799 UNLISTED PX VASCULAR SURGERY: CPT

## 2022-07-04 PROCEDURE — 99232 SBSQ HOSP IP/OBS MODERATE 35: CPT | Performed by: INTERNAL MEDICINE

## 2022-07-04 PROCEDURE — 99291 CRITICAL CARE FIRST HOUR: CPT | Performed by: INTERNAL MEDICINE

## 2022-07-04 PROCEDURE — 85730 THROMBOPLASTIN TIME PARTIAL: CPT

## 2022-07-04 PROCEDURE — 2500000003 HC RX 250 WO HCPCS: Performed by: STUDENT IN AN ORGANIZED HEALTH CARE EDUCATION/TRAINING PROGRAM

## 2022-07-04 PROCEDURE — 85025 COMPLETE CBC W/AUTO DIFF WBC: CPT

## 2022-07-04 PROCEDURE — 2700000000 HC OXYGEN THERAPY PER DAY

## 2022-07-04 PROCEDURE — 85018 HEMOGLOBIN: CPT

## 2022-07-04 PROCEDURE — 94761 N-INVAS EAR/PLS OXIMETRY MLT: CPT

## 2022-07-04 PROCEDURE — 82140 ASSAY OF AMMONIA: CPT

## 2022-07-04 RX ORDER — QUETIAPINE FUMARATE 25 MG/1
25 TABLET, FILM COATED ORAL ONCE
Status: COMPLETED | OUTPATIENT
Start: 2022-07-04 | End: 2022-07-04

## 2022-07-04 RX ADMIN — MIDODRINE HYDROCHLORIDE 5 MG: 5 TABLET ORAL at 17:10

## 2022-07-04 RX ADMIN — DEXMEDETOMIDINE HYDROCHLORIDE 1 MCG/KG/HR: 4 INJECTION, SOLUTION INTRAVENOUS at 23:48

## 2022-07-04 RX ADMIN — FENTANYL CITRATE 50 MCG: 50 INJECTION, SOLUTION INTRAMUSCULAR; INTRAVENOUS at 16:15

## 2022-07-04 RX ADMIN — MORPHINE SULFATE 2 MG: 2 INJECTION, SOLUTION INTRAMUSCULAR; INTRAVENOUS at 03:22

## 2022-07-04 RX ADMIN — MORPHINE SULFATE 2 MG: 2 INJECTION, SOLUTION INTRAMUSCULAR; INTRAVENOUS at 09:54

## 2022-07-04 RX ADMIN — MAGNESIUM GLUCONATE 500 MG ORAL TABLET 400 MG: 500 TABLET ORAL at 07:43

## 2022-07-04 RX ADMIN — SODIUM CHLORIDE, PRESERVATIVE FREE 10 ML: 5 INJECTION INTRAVENOUS at 20:20

## 2022-07-04 RX ADMIN — HEPARIN SODIUM 1300 UNITS: 1000 INJECTION INTRAVENOUS; SUBCUTANEOUS at 17:26

## 2022-07-04 RX ADMIN — HEPARIN SODIUM 14 UNITS/KG/HR: 10000 INJECTION, SOLUTION INTRAVENOUS at 03:08

## 2022-07-04 RX ADMIN — FENTANYL CITRATE 50 MCG: 50 INJECTION, SOLUTION INTRAMUSCULAR; INTRAVENOUS at 02:49

## 2022-07-04 RX ADMIN — FERROUS SULFATE TAB EC 325 MG (65 MG FE EQUIVALENT) 325 MG: 325 (65 FE) TABLET DELAYED RESPONSE at 12:25

## 2022-07-04 RX ADMIN — HEPARIN SODIUM 1200 UNITS: 1000 INJECTION INTRAVENOUS; SUBCUTANEOUS at 17:23

## 2022-07-04 RX ADMIN — FENTANYL CITRATE 50 MCG: 50 INJECTION, SOLUTION INTRAMUSCULAR; INTRAVENOUS at 21:38

## 2022-07-04 RX ADMIN — DEXMEDETOMIDINE HYDROCHLORIDE 1.5 MCG/KG/HR: 4 INJECTION, SOLUTION INTRAVENOUS at 04:51

## 2022-07-04 RX ADMIN — SODIUM CHLORIDE, PRESERVATIVE FREE 10 ML: 5 INJECTION INTRAVENOUS at 20:19

## 2022-07-04 RX ADMIN — DEXMEDETOMIDINE HYDROCHLORIDE 0.9 MCG/KG/HR: 4 INJECTION, SOLUTION INTRAVENOUS at 17:06

## 2022-07-04 RX ADMIN — HEPARIN SODIUM 4000 UNITS: 1000 INJECTION INTRAVENOUS; SUBCUTANEOUS at 22:46

## 2022-07-04 RX ADMIN — ANTI-FUNGAL POWDER MICONAZOLE NITRATE TALC FREE: 1.42 POWDER TOPICAL at 20:20

## 2022-07-04 RX ADMIN — FENTANYL CITRATE 50 MCG: 50 INJECTION, SOLUTION INTRAMUSCULAR; INTRAVENOUS at 01:06

## 2022-07-04 RX ADMIN — ALBUTEROL SULFATE 2.5 MG: 2.5 SOLUTION RESPIRATORY (INHALATION) at 14:42

## 2022-07-04 RX ADMIN — ATORVASTATIN CALCIUM 40 MG: 80 TABLET, FILM COATED ORAL at 07:43

## 2022-07-04 RX ADMIN — MAGNESIUM GLUCONATE 500 MG ORAL TABLET 400 MG: 500 TABLET ORAL at 20:20

## 2022-07-04 RX ADMIN — DEXMEDETOMIDINE HYDROCHLORIDE 1.2 MCG/KG/HR: 4 INJECTION, SOLUTION INTRAVENOUS at 10:01

## 2022-07-04 RX ADMIN — Medication 81 MG: at 07:44

## 2022-07-04 RX ADMIN — MIDODRINE HYDROCHLORIDE 5 MG: 5 TABLET ORAL at 12:25

## 2022-07-04 RX ADMIN — QUETIAPINE FUMARATE 25 MG: 25 TABLET ORAL at 15:33

## 2022-07-04 RX ADMIN — LEVOTHYROXINE SODIUM 25 MCG: 25 TABLET ORAL at 04:10

## 2022-07-04 RX ADMIN — ALBUTEROL SULFATE 2.5 MG: 2.5 SOLUTION RESPIRATORY (INHALATION) at 03:48

## 2022-07-04 RX ADMIN — SODIUM CHLORIDE, PRESERVATIVE FREE 10 MG: 5 INJECTION INTRAVENOUS at 07:43

## 2022-07-04 RX ADMIN — ANTI-FUNGAL POWDER MICONAZOLE NITRATE TALC FREE: 1.42 POWDER TOPICAL at 07:44

## 2022-07-04 RX ADMIN — SODIUM CHLORIDE, PRESERVATIVE FREE 10 ML: 5 INJECTION INTRAVENOUS at 07:45

## 2022-07-04 RX ADMIN — ALBUTEROL SULFATE 2.5 MG: 2.5 SOLUTION RESPIRATORY (INHALATION) at 20:01

## 2022-07-04 RX ADMIN — HEPARIN SODIUM 2000 UNITS: 1000 INJECTION INTRAVENOUS; SUBCUTANEOUS at 07:21

## 2022-07-04 RX ADMIN — MORPHINE SULFATE 2 MG: 2 INJECTION, SOLUTION INTRAMUSCULAR; INTRAVENOUS at 20:17

## 2022-07-04 RX ADMIN — MIDODRINE HYDROCHLORIDE 5 MG: 5 TABLET ORAL at 07:44

## 2022-07-04 RX ADMIN — ALBUTEROL SULFATE 2.5 MG: 2.5 SOLUTION RESPIRATORY (INHALATION) at 07:34

## 2022-07-04 ASSESSMENT — PULMONARY FUNCTION TESTS
PIF_VALUE: 20
PIF_VALUE: 17
PIF_VALUE: 18
PIF_VALUE: 17
PIF_VALUE: 18
PIF_VALUE: 14
PIF_VALUE: 10
PIF_VALUE: 13

## 2022-07-04 NOTE — PROGRESS NOTES
Dialysis Post Treatment Note  Patient tolerated treatment well. Denies complaints at time of discharge. Vitals:    07/04/22 1752   BP: (!) 118/48   Pulse: 72   Resp: 18   Temp: 97.5 °F (36.4 °C)   SpO2: 100%     Pre-Weight = 70.5kg  Post-weight = Weight: 151 lb 10.8 oz (68.8 kg)  Total Liters Processed = Total Liters Processed (l/min): 64 l/min  Rinseback Volume (mL) = Rinseback Volume (ml): 200 ml  Net Removal (mL) =2000ml  Length of treatment=180 minutes   Access: Right IJ catheter     Patient is ventilated and intubated, is restless on and off, On IV Precedex and IV heparin drip. Treatment completed, tolerated fairly. 30 minutes after starting dialysis, pt had hypotensive episode, BFR, UF goal adjusted accordingly, UF stopped for a little while, Fluid bolus of 100ml given. Total Fluid removal is 2000ml. No issues with the catheter.

## 2022-07-04 NOTE — PLAN OF CARE
Problem: Discharge Planning  Goal: Discharge to home or other facility with appropriate resources  7/3/2022 2042 by Ashleigh Leonardo RN  Outcome: Progressing  7/3/2022 1442 by Diann Hatch RN  Outcome: Progressing     Problem: Chronic Conditions and Co-morbidities  Goal: Patient's chronic conditions and co-morbidity symptoms are monitored and maintained or improved  7/3/2022 2042 by Ashleigh Leonardo RN  Outcome: Progressing  7/3/2022 1442 by Diann Hatch RN  Outcome: Progressing     Problem: Pain  Goal: Verbalizes/displays adequate comfort level or baseline comfort level  7/3/2022 2042 by Ashleigh Leonardo RN  Outcome: Progressing  7/3/2022 1442 by Diann Hatch RN  Outcome: Progressing     Problem: Skin/Tissue Integrity  Goal: Absence of new skin breakdown  Description: 1. Monitor for areas of redness and/or skin breakdown  2. Assess vascular access sites hourly  3. Every 4-6 hours minimum:  Change oxygen saturation probe site  4. Every 4-6 hours:  If on nasal continuous positive airway pressure, respiratory therapy assess nares and determine need for appliance change or resting period.   7/3/2022 2042 by Ashleigh Leonardo RN  Outcome: Progressing  7/3/2022 1442 by Diann Hatch RN  Outcome: Progressing     Problem: Safety - Adult  Goal: Free from fall injury  7/3/2022 2042 by Ashleigh Loenardo RN  Outcome: Progressing  7/3/2022 1442 by Diann Hatch RN  Outcome: Progressing     Problem: ABCDS Injury Assessment  Goal: Absence of physical injury  7/3/2022 2042 by Ashleigh Leonardo RN  Outcome: Progressing  7/3/2022 1442 by Diann Hatch RN  Outcome: Progressing     Problem: Respiratory - Adult  Goal: Achieves optimal ventilation and oxygenation  7/3/2022 2042 by Asheligh Leonardo RN  Outcome: Progressing  7/3/2022 1442 by Diann Hatch RN  Outcome: Progressing  7/3/2022 0915 by Bimal Roberto RCP  Outcome: Progressing  7/3/2022 0730 by Bimal Roberto RCP  Outcome: Progressing

## 2022-07-04 NOTE — PROGRESS NOTES
I/O last 3 completed shifts: In: 2347.3 [I.V.:1028.3; NG/GT:1319]  Out: - .      Patient Vitals for the past 96 hrs (Last 3 readings):   Weight   22 0252 156 lb 12 oz (71.1 kg)   22 0600 149 lb 7.6 oz (67.8 kg)   22 1120 151 lb 10.8 oz (68.8 kg)       Vital Signs:   Temperature:  Temp: 98.6 °F (37 °C)  TMax:   Temp (24hrs), Av.6 °F (37 °C), Min:98.6 °F (37 °C), Max:98.6 °F (37 °C)    Respirations:  Resp: 26  Pulse:   Heart Rate: 61  BP:    BP: (!) 127/50  BP Range: Systolic (92LNH), VMT:853 , Min:94 , VEO:044       Diastolic (10BWI), NMC:44, Min:42, Max:135      Physical Examination:     General:  Sedated on vent  Neck:   Supple, dialysis catheter in place  Chest:   Bilateral air entry on ventilator. Cardiac:  S1 S2 RR, no murmurs, gallops or rubs, JVP not raised. Abdomen: Soft, non-tender, +bs, no fluid wave  :   +smith  SKIN:  No rashes, good skin turgor. Extremities:  Lower legs wrapped, 2+ peripheral edema. Labs:       Recent Labs     22  0511 22  1737 22  0435 22  1857 22  0006   WBC 6.8  --  7.5  --  7.1   RBC 2.65*  --  2.76*  --  2.48*   HGB 8.3*   < > 8.1* 7.9* 7.5*   HCT 26.5*   < > 28.1* 26.7* 25.0*   .0  --  101.8  --  100.8   MCH 31.3  --  29.3  --  30.2   MCHC 31.3  --  28.8  --  30.0   RDW 16.7*  --  16.3*  --  16.4*     --  109*  --  109*   MPV 11.5  --  9.9  --  10.2    < > = values in this interval not displayed.       BMP:   Recent Labs     22  0511 22  0435 22  0006   * 134* 130*   K 4.1 4.1 3.8    103 97*   CO2 25 25 26   BUN 24* 40* 51*   CREATININE 1.35* 1.78* 2.10*   GLUCOSE 137* 158* 169*   CALCIUM 8.1* 8.0* 7.9*      Phosphorus:     Recent Labs     22  0511   PHOS 2.2*     YRIS:      Lab Results   Component Value Date/Time    YRIS NEGATIVE 10/27/2020 04:37 PM     SPEP:  Lab Results   Component Value Date/Time    PROT 5.3 2022 02:59 PM    ALBCAL 2.5 2022 03:00 PM ALBPCT 48 06/01/2022 03:00 PM    LABALPH 0.3 06/01/2022 03:00 PM    LABALPH 1.2 06/01/2022 03:00 PM    A1PCT 6 06/01/2022 03:00 PM    A2PCT 23 06/01/2022 03:00 PM    LABBETA 0.7 06/01/2022 03:00 PM    BETAPCT 13 06/01/2022 03:00 PM    GAMGLOB 0.5 06/01/2022 03:00 PM    GGPCT 10 06/01/2022 03:00 PM    PATH ELECTRONICALLY SIGNED. Aimee Clay M.D. 06/01/2022 03:00 PM    PATH ELECTRONICALLY SIGNED. Aimee Clay M.D. 06/01/2022 03:00 PM     UPEP:     Lab Results   Component Value Date/Time    LABPE  10/27/2020 04:35 PM     ELEVATED PROTEIN CONCENTRATION. MOST SERUM PROTEINS ARE DETECTED IN THIS URINE.   USUALLY OBSERVED WITH MARKEDLY INCREASED NON-SELECTIVE GLOMERULAR PERMEABILITY (i.e. SEVERE GLOMERULAR DISEASE), CONTAMINATION OF     C3:     Lab Results   Component Value Date/Time    C3 147 10/27/2020 04:37 PM     C4:     Lab Results   Component Value Date/Time    C4 29 10/27/2020 04:37 PM     MPO ANCA:     Lab Results   Component Value Date/Time    MPO 5 10/27/2020 04:37 PM     PR3 ANCA:     Lab Results   Component Value Date/Time    PR3 16 10/27/2020 04:37 PM     Hep BsAg:         Lab Results   Component Value Date/Time    HEPBSAG NONREACTIVE 06/28/2022 02:56 PM     Hep C AB:          Lab Results   Component Value Date/Time    HEPCAB NONREACTIVE 06/28/2022 02:56 PM       Urinalysis/Chemistries:      Lab Results   Component Value Date/Time    NITRU NEGATIVE 06/27/2022 06:35 PM    COLORU Dark Yellow 06/27/2022 06:35 PM    PHUR 5.5 06/27/2022 06:35 PM    WBCUA 20 TO 50 06/27/2022 06:35 PM    RBCUA 20 TO 50 06/27/2022 06:35 PM    MUCUS 1+ 06/27/2022 06:35 PM    TRICHOMONAS NOT REPORTED 02/07/2022 02:42 AM    YEAST NOT REPORTED 02/07/2022 02:42 AM    BACTERIA FEW 06/12/2022 09:33 PM    SPECGRAV 1.030 06/27/2022 06:35 PM    LEUKOCYTESUR SMALL 06/27/2022 06:35 PM    UROBILINOGEN Normal 06/27/2022 06:35 PM    BILIRUBINUR NEGATIVE 06/27/2022 06:35 PM    GLUCOSEU 1+ 06/27/2022 06:35 PM    KETUA TRACE 06/27/2022 06:35 PM    AMORPHOUS NOT REPORTED 02/07/2022 02:42 AM     Urine Sodium:     Lab Results   Component Value Date/Time    SLOAN <20 06/08/2022 09:52 PM       Urine Creatinine:     Lab Results   Component Value Date/Time    LABCREA 63.0 06/08/2022 09:52 PM         Radiology:     CXR: Reviewed  None new today     Assessment:     1. Apparent end-stage renal disease likely secondary to progression of underlying diabetic nephropathy with the patient now anuric x 3 days. Minimal urine with bladder scanning. 2.  Hyperkalemia from progressive decline in kidney function resolved  3. Nephrotic range proteinuria about 20 g from diabetic nephropathy  4. Respiratory failure likely from fluid overload  5. Nonhealing lower extremity ulcers  6. Hypotension on pressors  7. Fluid overload    Plan:   1.  HD today orders reviewed with dialysis RN, will attempt to remove 2-2.5L as tolerated. 2.  Continue to evaluate daily the need for dialysis on a daily basis. 3.  Plan is for tunneled hemodialysis catheter placement this coming week, hopefully Tuesday. 4.  Follow up labs ordered. 5.  Following. Will discuss with Dr. Simba Wills    Nutrition   Please ensure that patient is on a renal diet/TF. Avoid nephrotoxic drugs/contrast exposure. We will continue to follow along with you. Electronically signed by MARLON Cobb on 7/4/2022 at 9:05 AM   [facility]          Attending Physician Statement  I have discussed the care of [address] [place] [address], including pertinent history and exam findings,  with the CNP. I have reviewed the key elements of all parts of the encounter with the CNP. I agree with the assessment, plan and orders as documented.     Betty Montemayor MD MD, MRCP Marnie Kurtz, FACP   7/4/2022 2:02 PM    Nephrology [address] [place] [address]

## 2022-07-04 NOTE — PROGRESS NOTES
[facility]  Progress Note    Ezio Dubose  MRN: [id number]  Date: 7/4/2022  LOS: 7     Reason for F/U :   Concern for septic shock    Impression :   1. Severe bradycardia-resolved  · requiring dopamine and Levophed for blood pressure support  · Able to wean off the dopamine 6/29/22  2. Acute respiratory insufficiency-ventilator dependent intubated 6/28/2022  3. Pulmonary edema  4. Acute kidney injury on chronic kidney disease  · Started on hemodialysis 6/28/2022  5. Leukocytosis-unclear etiology at this time  6. Shock-on Levophed  7. Multiple/bilateral lower extremity ulcerations-superficial not acutely infected  8. Coccygeal ulceration/right gluteal ulceration-clean    Recommendations:   · The patient received:  · 1 dose of levofloxacin on 6/27/2022   · Cefepime 6/28 through 7/2/2022  · The chest x-ray continues to show changes of bilateral pleural effusion/fluid overload  · The culture data has remained negative with no evidence of an acute infectious process. · Continue off systemic antimicrobial therapy for now    Infection Control Recommendations:   Universal precautions    Discharge Planning:   Estimated Length of IV antimicrobials: 7/2/22  Patient will need Midline Catheter Insertion/ PICC line Insertion: No  Patient will need: Home IV , [place],  SNF,  LTAC: Undetermined  Patient willneed outpatient wound care: No    Medical Decision making / Summary of Stay:   Ezio Dubose is a 66y.o.-year-old female who was initially admitted on 6/27/2022.    Lara Victoria has a history of diabetes mellitus type 2 with associated chronic kidney disease stage IV, hyperlipidemia, coronary artery disease, hypertension, asthma, previous CVA, acute on chronic combined systolic and diastolic heart failure, chronic left lower extremity wound for which she follows in the wound care center, meningioma     The patient was short of breath at home O2 sats in the low 90% on oxygen.     The patient was boots   Neurological:      Mental Status: She is oriented to person, place, and time. Laboratory data:   I have independently reviewed the followinglabs:  CBC with Differential:   Recent Labs     07/03/22  0435 07/03/22  0435 07/03/22  1857 07/04/22  0006   WBC 7.5  --   --  7.1   HGB 8.1*   < > 7.9* 7.5*   HCT 28.1*   < > 26.7* 25.0*   *  --   --  109*   LYMPHOPCT 10*  --   --  8*   MONOPCT 17*  --   --  17*    < > = values in this interval not displayed. BMP:   Recent Labs     07/03/22  0435 07/04/22  0006   * 130*   K 4.1 3.8    97*   CO2 25 26   BUN 40* 51*   CREATININE 1.78* 2.10*     Hepatic Function Panel:   No results for input(s): PROT, LABALBU, BILIDIR, IBILI, BILITOT, ALKPHOS, ALT, AST in the last 72 hours.       Lab Results   Component Value Date/Time    PROCAL 0.11 06/27/2022 12:44 PM    PROCAL 0.26 03/07/2022 05:53 AM     Lab Results   Component Value Date/Time    CRP <3.0 03/01/2022 10:07 PM    CRP <3.0 02/07/2022 06:23 AM     Lab Results   Component Value Date    SEDRATE 7 10/17/2017         Lab Results   Component Value Date/Time    DDIMER 0.71 07/25/2020 11:20 AM     Lab Results   Component Value Date/Time    FERRITIN 13 03/01/2022 10:07 PM    FERRITIN 155 07/25/2020 11:20 AM     Lab Results   Component Value Date/Time     03/10/2022 11:50 AM     07/25/2020 11:20 AM     Lab Results   Component Value Date/Time    FIBRINOGEN 610 07/25/2020 11:20 AM       Results in Past 30 Days  Result Component Current Result Ref Range Previous Result Ref Range   SARS-CoV-2, Rapid Not Detected (6/27/2022) Not Detected Not Detected (6/12/2022) Not Detected     Lab Results   Component Value Date/Time    COVID19 Not Detected 06/27/2022 01:44 PM    COVID19 Not Detected 06/12/2022 06:34 PM    COVID19 Not Detected 03/01/2022 10:34 PM    COVID19 Not Detected 02/06/2022 09:46 AM    COVID19 Not Detected 07/25/2020 12:17 PM       No results for input(s): [facility] in the last 72 COVID-19 assay is designed to detect the virus that causes COVID-19 in patients   with signs and symptoms of infection who are suspected of COVID-19. An individual without symptoms of COVID-19 and who is not shedding SARS-CoV-2 virus would   expect to have a negative (not detected) result in this assay. Negative results should be treated as presumptive and, if inconsistent with clinical signs   and symptoms or necessary for patient management,   should be tested with an alternative molecular assay. Negative results do not preclude   SARS-CoV-2 infection and   should not be used as the sole basis for patient management decisions.         Fact sheet for Healthcare Providers: BuildHer.es   Fact sheet for Patients: BuildHer.es           Methodology: Isothermal Nucleic Acid Amplification         Medications:      famotidine (PEPCID) injection  10 mg IntraVENous Daily    levothyroxine  25 mcg Oral Daily    lidocaine 1 % injection  5 mL IntraDERmal Once    sodium chloride flush  5-40 mL IntraVENous 2 times per day    midodrine  5 mg Per NG tube TID WC    sodium polystyrene  15 g Oral Once    [Held by provider] amiodarone  100 mg Oral Daily    rocuronium  0.6 mg/kg IntraVENous Once    albuterol  2.5 mg Nebulization Q6H    sodium chloride flush  5-40 mL IntraVENous 2 times per day    aspirin EC  81 mg Oral Daily    atorvastatin  40 mg Oral Daily    magnesium oxide  400 mg Oral BID    miconazole   Topical BID    ferrous sulfate  325 mg Oral Daily with breakfast           [facility]  Viral Sorenson MD  Perfect Serve messaging  OFFICE: [phone]      Electronically signed by Viral Sorenson MD on 7/4/2022 at 1:06 PM  Thank you for allowing us to participate in the care of this patient. Please call with questions.     This note iscreated with the assistance of a speech recognition program.  While intending to generate a document that actually reflects the content of the visit, the document can still have some errors including those of syntax andsound a like substitutions which may escape proof reading. In such instances, actual meaning can be extrapolated by contextual diversion.

## 2022-07-04 NOTE — PLAN OF CARE
Problem: Respiratory - Adult  Goal: Achieves optimal ventilation and oxygenation  7/4/2022 0756 by Venice Phillip RCP  Outcome: Progressing  7/3/2022 2042 by Ashley Montiel RN  Outcome: Progressing

## 2022-07-04 NOTE — PROGRESS NOTES
Critical Care Team - Daily Progress Note      Date and time: 7/4/2022 10:44 AM  Patient's name:  Loi Benson  Medical Record Number: [id number]  Patient's account/billing number: [de-identified]  Patient's YOB: 1950  Age: 66 y.o. Date of Admission: 6/27/2022 12:28 PM  Length of stay during current admission: 7      Primary Care Physician: Monique Boss MD  ICU Attending Physician: Dr. Tyrese Pérez Status: Full Code    Reason for ICU admission:   Chief Complaint   Patient presents with    Bradycardia       Subjective:     OVERNIGHT EVENTS:  No significant events overnight    Today:  Hemodynamically stable, afebrile overnight ,off pressors   Continue to remain intubated and sedated with precedex, went up to 1.2    -vent settings: 26/350/5/40%.  Unchanged from yesterd   abg pH 7.46/38.6/93.1/28  -likely dialysis today    -Blood cultures and urine cultures are negative- cefepime monitored off   -na [id number]  -wbc 6.8>7.5>7.1  -hgb stable 8.1>8.2>8.3>7.8>8.1 >7.5  -heparin for PAF.  -amiodarone on hold as barely HR in 60-61 and becomes bradycardic on amiodarone  -nephro on board       Plan :  · SBT      AWAKE & FOLLOWING COMMANDS:  [x] No (, opens eyes to verbal commands) [] Yes    CURRENT VENTILATION STATUS:     [x] Ventilator  [] BIPAP  [] Nasal Cannula [] Room Air          SECRETIONS Amount:  [] Small [] Moderate  [] Large  [x] None  Color:     [] White [] Colored  [] Bloody    SEDATION:  RAAS Score:  [] Propofol gtt  [] Versed gtt  [] Ativan gtt   [] No Sedation PRECEDEX    PARALYZED:  [x] No    [] Yes    DIARRHEA:                [x] No                [] Yes  (C. Difficile status: [] positive                                                                                                                       [] negative                                                                                                                     [] pending)    VASOPRESSORS:  [x] No    [] Yes    If yes -    [] Levophed       [] Dopamine     [] Vasopressin       [] Dobutamine  [] Phenylephrine         [] Epinephrine    CENTRAL LINES:     [] No   [x] Yes   (Date of Insertion:   )           If yes -     [x] Right IJ     [] Left IJ [] Right Femoral [] Left Femoral                   [] Right Subclavian [] Left Subclavian       GUTIERREZ'S CATHETER:   [] No   [x] Yes  (Date of Insertion:   )     URINE OUTPUT:            [] Good   [] Low              [x] Anuric    REVIEW OF SYSTEMS:  Unable to obtain due to patient mentation. OBJECTIVE:     VITAL SIGNS:  BP (!) 127/50   Pulse 61   Temp 98.6 °F (37 °C) (Esophageal)   Resp 26   Ht 4' 11\" (1.499 m)   Wt 156 lb 12 oz (71.1 kg)   SpO2 100%   BMI 31.66 kg/m²   Tmax over 24 hours:  Temp (24hrs), Av.6 °F (37 °C), Min:98.6 °F (37 °C), Max:98.6 °F (37 °C)      Patient Vitals for the past 8 hrs:   BP Temp Temp src Pulse Resp SpO2 Weight   22 0800 -- 98.6 °F (37 °C) Esophageal -- -- -- --   22 0734 -- -- -- 61 26 100 % --   22 0700 -- -- -- 63 26 100 % --   22 0600 (!) 127/50 -- -- 62 26 99 % --   22 0500 (!) 125/47 -- -- 59 26 97 % --   22 0400 (!) 112/51 -- -- 54 26 98 % --   22 0352 -- -- -- 63 26 100 % --   22 0300 (!) 127/90 -- -- 56 25 100 % --   22 0252 -- -- -- -- -- -- 156 lb 12 oz (71.1 kg)         Intake/Output Summary (Last 24 hours) at 2022 1044  Last data filed at 2022 0800  Gross per 24 hour   Intake 1387.06 ml   Output --   Net 1387.06 ml     Date 22 0000 - 22 2359   Shift [id number] 8998-1960 5347-8915 24 Hour Total   INTAKE   I.V.(mL/kg) 311(4.4)   311(4.4)   NG/GT(mL/kg) 117(1.6) 172(2.4)  289(4.1)   Shift Total(mL/kg) 428(6) 172(2.4)  600(8.4)   OUTPUT   Shift Total(mL/kg)       Weight (kg) 71.1 71.1 71.1 71.1     Wt Readings from Last 3 Encounters:   22 156 lb 12 oz (71.1 kg)   22 152 lb (68.9 kg)   22 152 lb (68.9 kg)     Body mass index is 31.66 kg/m². PRN  sodium chloride, , PRN  ondansetron, 4 mg, Q8H PRN   Or  ondansetron, 4 mg, Q6H PRN  polyethylene glycol, 17 g, Daily PRN  acetaminophen, 650 mg, Q6H PRN   Or  acetaminophen, 650 mg, Q6H PRN  heparin (porcine), 4,000 Units, PRN  heparin (porcine), 2,000 Units, PRN  morphine, 2 mg, Q4H PRN        SUPPORT DEVICES: [x] Ventilator [] BIPAP  [] Nasal Cannula [] Room Air    VENT SETTINGS (Comprehensive) (if applicable):  Vent Information  Ventilator ID: [id number]  Vent Mode: AC/PRVC  Additional Respiratory Assessments  Heart Rate: 61  Resp: 26  SpO2: 100 %  End Tidal CO2: 26 (%)  Position: Semi-Leung's  Humidification Source: HME  Cuff Pressure (cm H2O):  (mov)    ABGs:     Lab Results   Component Value Date/Time    PHART 7.459 03/09/2022 04:28 AM    PNI6SHK 39.9 03/09/2022 04:28 AM    PO2ART 107.0 03/09/2022 04:28 AM    BEY8OTK 28.3 03/09/2022 04:28 AM    G4BLCEOW 97.4 03/09/2022 04:28 AM    FIO2 40.0 07/04/2022 05:13 AM     Lactic Acid:   Lab Results   Component Value Date/Time    LACTA 0.7 03/04/2022 02:30 PM    LACTA 1.2 02/18/2021 01:00 AM    LACTA 1.8 07/25/2020 11:20 AM         DATA:  Complete Blood Count:   Recent Labs     07/02/22  0511 07/02/22  1737 07/03/22  0435 07/03/22  1857 07/04/22  0006   WBC 6.8  --  7.5  --  7.1   HGB 8.3*   < > 8.1* 7.9* 7.5*   .0  --  101.8  --  100.8     --  109*  --  109*   RBC 2.65*  --  2.76*  --  2.48*   HCT 26.5*   < > 28.1* 26.7* 25.0*   MCH 31.3  --  29.3  --  30.2   MCHC 31.3  --  28.8  --  30.0   RDW 16.7*  --  16.3*  --  16.4*   MPV 11.5  --  9.9  --  10.2    < > = values in this interval not displayed.         PT/INR:    Lab Results   Component Value Date/Time    PROTIME 10.8 06/14/2022 02:56 AM    INR 1.0 06/14/2022 02:56 AM     PTT:    Lab Results   Component Value Date/Time    APTT 46.7 07/04/2022 06:13 AM       Basal Metabolic Profile:   Recent Labs     07/02/22  0511 07/03/22  0435 07/04/22  0006   * 134* 130*   K 4.1 4.1 3.8   BUN 24* 40* 51* CREATININE 1.35* 1.78* 2.10*    103 97*   CO2 25 25 26      Magnesium:   Lab Results   Component Value Date/Time    MG 2.3 06/09/2022 06:00 AM    MG 2.4 06/07/2022 09:45 PM    MG 2.3 05/18/2022 02:19 PM     Phosphorus:   Lab Results   Component Value Date/Time    PHOS 2.2 07/02/2022 05:11 AM    PHOS 4.4 06/15/2022 08:07 AM    PHOS 4.6 05/18/2022 02:19 PM     S. Calcium:  Recent Labs     07/04/22  0006   CALCIUM 7.9*     S. Ionized Calcium:No results for input(s): IONCA in the last 72 hours. Urinalysis:   Lab Results   Component Value Date/Time    NITRU NEGATIVE 06/27/2022 06:35 PM    COLORU Dark Yellow 06/27/2022 06:35 PM    PHUR 5.5 06/27/2022 06:35 PM    WBCUA 20 TO 50 06/27/2022 06:35 PM    RBCUA 20 TO 50 06/27/2022 06:35 PM    MUCUS 1+ 06/27/2022 06:35 PM    TRICHOMONAS NOT REPORTED 02/07/2022 02:42 AM    YEAST NOT REPORTED 02/07/2022 02:42 AM    BACTERIA FEW 06/12/2022 09:33 PM    SPECGRAV 1.030 06/27/2022 06:35 PM    LEUKOCYTESUR SMALL 06/27/2022 06:35 PM    UROBILINOGEN Normal 06/27/2022 06:35 PM    BILIRUBINUR NEGATIVE 06/27/2022 06:35 PM    GLUCOSEU 1+ 06/27/2022 06:35 PM    KETUA TRACE 06/27/2022 06:35 PM    AMORPHOUS NOT REPORTED 02/07/2022 02:42 AM       CARDIAC ENZYMES: No results for input(s): CKMB, CKMBINDEX, TROPONINI in the last 72 hours. Invalid input(s): CKTOTAL;3  BNP: No results for input(s): BNP in the last 72 hours. LFTS  No results for input(s): ALKPHOS, ALT, AST, BILITOT, BILIDIR, LABALBU in the last 72 hours. AMYLASE/LIPASE/AMMONIA  No results for input(s): AMYLASE, LIPASE, AMMONIA in the last 72 hours.     Last 3 Blood Glucose:   Recent Labs     07/02/22  0511 07/03/22  0435 07/04/22  0006   GLUCOSE 137* 158* 169*      HgBA1c:    Lab Results   Component Value Date/Time    LABA1C 4.6 03/31/2022 11:21 AM         TSH:    Lab Results   Component Value Date/Time    TSH 9.54 06/27/2022 12:44 PM     ANEMIA STUDIES  No results for input(s): LABIRON, TIBC, FERRITIN, [id number], FOLATE, OCCULTBLD in the last 72 hours. Cultures during this admission:     Blood cultures:                 [] None drawn      [x] Negative             []  Positive (Details:  )  Urine Culture:                   [] None drawn      [x] Negative             []  Positive (Details:pend  )  Sputum Culture:               [] None drawn       [] Negative             []  Positive (Details:  )   Endotracheal aspirate:     [] None drawn       [] Negative             []  Positive (Details:  )        ASSESSMENT:     Principal Problem:    Bradycardia  Active Problems:    Lymphedema    Acute kidney injury superimposed on CKD (HCC)    Hyperkalemia    Shock ([address]    Leukocytosis    Acute respiratory failure with hypoxia and hypercapnia (HCC)    Pulmonary edema cardiac cause ([facility])    Acute on chronic diastolic congestive heart failure ([address]  Resolved Problems:    * No resolved hospital problems. *  Acute kidney injury versus progression of underlying kidney disease, oliguric dependent on dialysis  Hyperkalemia-resolved        PLAN:   Neurologic:   · Neuro checks per protocol  · Worsening mentatation in last two weeks before coming in per daughter. baseline status otherwise normal.   · CT of head performed in ED. Suggestive of 2.6 cm anterior right temporal meningioma with mild increase in vasogenic edema  · Previous CT's reviewed. · Neurology signed off  with no new recommendations    Cardiovascular:RESOLVED         Sinus Bradycardia   ? Sinus bradycardia with hyperkalemia and hypothermia on admission  ? Patient is currently off of the dopamine gtt. and epinephrine  ? Continue amiodarone 100 mg daily as per cardiology recommendation-currently on hold as HR in 60-61 range-will resume as HR improves/as necessary  ? Cardiology signed off  ? Continue to monitor     History of paroxysmal Atrial Fibrillation - CHADS VASC score of 6  · Patient is on Eliquis at home, hemodynamically stable, not in RVR.   Heparin is on hold due to low Hb yesterday-Hb is 8.3 today-   Chronic Diastolic Heart Failure  · Recent echo showed EF of 60 to 65%.       Pulmonary:  COPD   Patient was recently started on home oxygen 2 to 3 L. Breathing treatments as needed. Possible concern community versus hospital-acquired pneumonia, appropriate antibiotics. Scattered infiltrates with bilateral effusions sent on chest x-ray completed this morning. MONITOR OFF ABX     GI/Nutrition  · Glycolax PRN  · Ulcer Prophylaxis: famotidine 10 mg iv qd given mec vent>48h+on antiplatelet agent ([id number])  · Diet:On TF     Renal/Fluid/Electrolyte  JOY on CKD Stage 4  · Creatinine 2.13 on admission with baseline around 1.7  · Most likely pre-renal secondary to decreased perfusion due to pulmonary vascular congestion. · Patient got 2 session of HD  On 30th and 2nd  ,NEPHRO on board .      ID  · Scattered infiltrates with bilateral pleural effusions present on chest x-ray this morning. Patient upon appropriate antibiotics, cefepime for hospital versus community-acquired pneumonia. We will continue to trend white count, manage fevers at present. White blood cell count trending down  · 7/3/2022 monitor off antibiotics       Hematology:      · Recent Labs     06/27/22  1244   HGB 10.5*   · Stable. Daily CBC.      Endocrine:   ? Hx of diabetes mellitus not on any home medications  ? Most recent HbA1c 4.6 from 3/31/2022  ? Will continue to monitor-BGs controlled <180  ? On TF     DVT Prophylaxis  ? Heparin        Avinash Fong MD,        Resident internal medicine, PGY-3  [id number] [address] ([facility])             7/4/2022, 10:44 AM    Attending Physician Statement  I have discussed the care of [address] [place] [address] Drive, including pertinent history and exam findings with the resident. I have reviewed the key elements of all parts of the encounter with the resident. I have seen and examined the patient with the resident.   I agree with the assessment and plan and status of the problem list as documented. I have seen the patient during my round today, chart reviewed, labs and medications reviewed overnight events noted per  Patient is off pressors maintaining blood pressure. She is on Precedex apparently required increased dose of Precedex overnight. When I saw her she was lethargic eyes blinking on stimulation sometime intermittently follows commands but not persistent. Ventilator setting PRVC/26/350/5/40 percent ABG 7.4 6/38/93/28. She is currently off antibiotic cultures are negative. WBC count remained stable at 7 hemoglobin is stable. She was started on heparin drip currently on heparin drip. Will start Seroquel at night  Will decrease Precedex drip and wean down as low as possible. Today patient is on CPAP/pressure support intermittently she is tachypneic according to nursing staff. Will continue spontaneous breathing trial and if able to wean down Precedex more arousable and able to mobilize secretion and protect airways then extubation otherwise spontaneous breathing trial tomorrow. Discussed with ICU nursing staff, treatment and plan discussed. Total critical care time caring for this patient with life threatening, unstable organ failure, including direct patient contact, management of life support systems, review of data including imaging and labs, discussions with other team members and physicians at least 35 min so far today, excluding procedures. Please note that this chart was generated using voice recognition Dragon dictation software. Although every effort was made to ensure the accuracy of this automated transcription, some errors in transcription may have occurred.      Nikki Santoro MD  7/4/2022 1:12 PM

## 2022-07-04 NOTE — PLAN OF CARE
Problem: Discharge Planning  Goal: Discharge to home or other facility with appropriate resources  Outcome: Progressing     Problem: Chronic Conditions and Co-morbidities  Goal: Patient's chronic conditions and co-morbidity symptoms are monitored and maintained or improved  Outcome: Progressing     Problem: Pain  Goal: Verbalizes/displays adequate comfort level or baseline comfort level  Outcome: Progressing     Problem: Skin/Tissue Integrity  Goal: Absence of new skin breakdown  Description: 1. Monitor for areas of redness and/or skin breakdown  2. Assess vascular access sites hourly  3. Every 4-6 hours minimum:  Change oxygen saturation probe site  4. Every 4-6 hours:  If on nasal continuous positive airway pressure, respiratory therapy assess nares and determine need for appliance change or resting period. Outcome: Progressing     Problem: Safety - Adult  Goal: Free from fall injury  Outcome: Progressing  Flowsheets (Taken 7/4/2022 1943)  Free From Fall Injury: Instruct family/caregiver on patient safety     Problem: ABCDS Injury Assessment  Goal: Absence of physical injury  Outcome: Progressing  Flowsheets (Taken 7/4/2022 1943)  Absence of Physical Injury: Implement safety measures based on patient assessment     Problem: Respiratory - Adult  Goal: Achieves optimal ventilation and oxygenation  7/4/2022 1944 by Jeferson Gonzáles RN  Outcome: Progressing  7/4/2022 0756 by Julisa Escobar RCP  Outcome: Progressing     Problem: Safety - Medical Restraint  Goal: Remains free of injury from restraints (Restraint for Interference with Medical Device)  Description: INTERVENTIONS:  1. Determine that other, less restrictive measures have been tried or would not be effective before applying the restraint  2. Evaluate the patient's condition at the time of restraint application  3. Inform patient/family regarding the reason for restraint  4.  Q2H: Monitor safety, psychosocial status, comfort, nutrition and hydration  Outcome: Progressing  Flowsheets  Taken 7/4/2022 1800 by Wilfred Ruffin RN  Remains free of injury from restraints (restraint for interference with medical device): Every 2 hours: Monitor safety, psychosocial status, comfort, nutrition and hydration  Taken 7/4/2022 1600 by Wilfred Ruffin RN  Remains free of injury from restraints (restraint for interference with medical device): Every 2 hours: Monitor safety, psychosocial status, comfort, nutrition and hydration  Taken 7/4/2022 1400 by Wilfred Ruffin RN  Remains free of injury from restraints (restraint for interference with medical device): Every 2 hours: Monitor safety, psychosocial status, comfort, nutrition and hydration  Taken 7/4/2022 1200 by Wilfred Ruffin RN  Remains free of injury from restraints (restraint for interference with medical device): Every 2 hours: Monitor safety, psychosocial status, comfort, nutrition and hydration  Taken 7/4/2022 1000 by Wilfred Ruffin RN  Remains free of injury from restraints (restraint for interference with medical device): Every 2 hours: Monitor safety, psychosocial status, comfort, nutrition and hydration  Taken 7/4/2022 0800 by Wilfred Ruffin RN  Remains free of injury from restraints (restraint for interference with medical device): Every 2 hours: Monitor safety, psychosocial status, comfort, nutrition and hydration  Taken 7/4/2022 0600 by Lucio Castillo RN  Remains free of injury from restraints (restraint for interference with medical device): Every 2 hours: Monitor safety, psychosocial status, comfort, nutrition and hydration     Problem: Nutrition Deficit:  Goal: Optimize nutritional status  Outcome: Progressing

## 2022-07-04 NOTE — PROGRESS NOTES
Dialysis Time Out  To be done by RN and tech or 2 RNs  Staff Names RN Nalini Rodriguez and SHERITA Shane    [x]  Identity of the patient using 2 patient identifiers  [x]  Consent for treatment  [x]  Equipment-proper machine and dialyzer  [x]  B-Hep B status  [x]  Orders- to include bath, blood flow, dialyzer, time and fluid removal  [x]  Access-Correct site and in working order  [x]  Time for patient to ask questions.

## 2022-07-05 LAB
ABSOLUTE EOS #: 0.1 K/UL (ref 0–0.44)
ABSOLUTE IMMATURE GRANULOCYTE: 0.3 K/UL (ref 0–0.3)
ABSOLUTE LYMPH #: 0.96 K/UL (ref 1.1–3.7)
ABSOLUTE MONO #: 1.29 K/UL (ref 0.1–1.2)
ALLEN TEST: POSITIVE
ANION GAP SERPL CALCULATED.3IONS-SCNC: 5 MMOL/L (ref 9–17)
BASOPHILS # BLD: 0 % (ref 0–2)
BASOPHILS ABSOLUTE: <0.03 K/UL (ref 0–0.2)
BUN BLDV-MCNC: 29 MG/DL (ref 8–23)
CALCIUM SERPL-MCNC: 7.6 MG/DL (ref 8.6–10.4)
CHLORIDE BLD-SCNC: 99 MMOL/L (ref 98–107)
CO2: 29 MMOL/L (ref 20–31)
CREAT SERPL-MCNC: 1.49 MG/DL (ref 0.5–0.9)
EOSINOPHILS RELATIVE PERCENT: 1 % (ref 1–4)
FIO2: 30
GFR AFRICAN AMERICAN: 42 ML/MIN
GFR NON-AFRICAN AMERICAN: 34 ML/MIN
GFR SERPL CREATININE-BSD FRML MDRD: ABNORMAL ML/MIN/{1.73_M2}
GLUCOSE BLD-MCNC: 146 MG/DL (ref 70–99)
HCT VFR BLD CALC: 22.9 % (ref 36.3–47.1)
HCT VFR BLD CALC: 23.3 % (ref 36.3–47.1)
HCT VFR BLD CALC: 23.9 % (ref 36.3–47.1)
HEMOGLOBIN: 7 G/DL (ref 11.9–15.1)
HEMOGLOBIN: 7 G/DL (ref 11.9–15.1)
HEMOGLOBIN: 7.3 G/DL (ref 11.9–15.1)
IMMATURE GRANULOCYTES: 4 %
LYMPHOCYTES # BLD: 12 % (ref 24–43)
MCH RBC QN AUTO: 29.5 PG (ref 25.2–33.5)
MCHC RBC AUTO-ENTMCNC: 30.6 G/DL (ref 28.4–34.8)
MCV RBC AUTO: 96.6 FL (ref 82.6–102.9)
MODE: ABNORMAL
MONOCYTES # BLD: 17 % (ref 3–12)
NRBC AUTOMATED: 0 PER 100 WBC
O2 DEVICE/FLOW/%: ABNORMAL
PARTIAL THROMBOPLASTIN TIME: 32.6 SEC (ref 20.5–30.5)
PDW BLD-RTO: 16.4 % (ref 11.8–14.4)
PLATELET # BLD: ABNORMAL K/UL (ref 138–453)
PLATELET, FLUORESCENCE: 92 K/UL (ref 138–453)
PLATELET, IMMATURE FRACTION: 2.5 % (ref 1.1–10.3)
POC HCO3: 28.5 MMOL/L (ref 21–28)
POC O2 SATURATION: 98 % (ref 94–98)
POC PCO2: 44.1 MM HG (ref 35–48)
POC PH: 7.42 (ref 7.35–7.45)
POC PO2: 101.7 MM HG (ref 83–108)
POSITIVE BASE EXCESS, ART: 4 (ref 0–3)
POTASSIUM SERPL-SCNC: 4.2 MMOL/L (ref 3.7–5.3)
RBC # BLD: 2.37 M/UL (ref 3.95–5.11)
RBC # BLD: ABNORMAL 10*6/UL
REASON FOR REJECTION: NORMAL
SAMPLE SITE: ABNORMAL
SEG NEUTROPHILS: 66 % (ref 36–65)
SEGMENTED NEUTROPHILS ABSOLUTE COUNT: 5.15 K/UL (ref 1.5–8.1)
SODIUM BLD-SCNC: 133 MMOL/L (ref 135–144)
WBC # BLD: 7.8 K/UL (ref 3.5–11.3)
ZZ NTE CLEAN UP: ORDERED TEST: NORMAL
ZZ NTE WITH NAME CLEAN UP: SPECIMEN SOURCE: NORMAL

## 2022-07-05 PROCEDURE — 76937 US GUIDE VASCULAR ACCESS: CPT

## 2022-07-05 PROCEDURE — 2000000000 HC ICU R&B

## 2022-07-05 PROCEDURE — 99291 CRITICAL CARE FIRST HOUR: CPT | Performed by: INTERNAL MEDICINE

## 2022-07-05 PROCEDURE — 94640 AIRWAY INHALATION TREATMENT: CPT

## 2022-07-05 PROCEDURE — 2700000000 HC OXYGEN THERAPY PER DAY

## 2022-07-05 PROCEDURE — 51798 US URINE CAPACITY MEASURE: CPT

## 2022-07-05 PROCEDURE — 85014 HEMATOCRIT: CPT

## 2022-07-05 PROCEDURE — 6360000002 HC RX W HCPCS: Performed by: STUDENT IN AN ORGANIZED HEALTH CARE EDUCATION/TRAINING PROGRAM

## 2022-07-05 PROCEDURE — 99232 SBSQ HOSP IP/OBS MODERATE 35: CPT | Performed by: INTERNAL MEDICINE

## 2022-07-05 PROCEDURE — 85730 THROMBOPLASTIN TIME PARTIAL: CPT

## 2022-07-05 PROCEDURE — 36415 COLL VENOUS BLD VENIPUNCTURE: CPT

## 2022-07-05 PROCEDURE — 6360000002 HC RX W HCPCS: Performed by: INTERNAL MEDICINE

## 2022-07-05 PROCEDURE — 6370000000 HC RX 637 (ALT 250 FOR IP): Performed by: STUDENT IN AN ORGANIZED HEALTH CARE EDUCATION/TRAINING PROGRAM

## 2022-07-05 PROCEDURE — 2580000003 HC RX 258: Performed by: STUDENT IN AN ORGANIZED HEALTH CARE EDUCATION/TRAINING PROGRAM

## 2022-07-05 PROCEDURE — 36600 WITHDRAWAL OF ARTERIAL BLOOD: CPT

## 2022-07-05 PROCEDURE — 2500000003 HC RX 250 WO HCPCS: Performed by: STUDENT IN AN ORGANIZED HEALTH CARE EDUCATION/TRAINING PROGRAM

## 2022-07-05 PROCEDURE — 85018 HEMOGLOBIN: CPT

## 2022-07-05 PROCEDURE — 82803 BLOOD GASES ANY COMBINATION: CPT

## 2022-07-05 PROCEDURE — 99214 OFFICE O/P EST MOD 30 MIN: CPT

## 2022-07-05 PROCEDURE — 94761 N-INVAS EAR/PLS OXIMETRY MLT: CPT

## 2022-07-05 PROCEDURE — 94003 VENT MGMT INPAT SUBQ DAY: CPT

## 2022-07-05 PROCEDURE — 80048 BASIC METABOLIC PNL TOTAL CA: CPT

## 2022-07-05 PROCEDURE — 51701 INSERT BLADDER CATHETER: CPT

## 2022-07-05 PROCEDURE — 85055 RETICULATED PLATELET ASSAY: CPT

## 2022-07-05 PROCEDURE — 85025 COMPLETE CBC W/AUTO DIFF WBC: CPT

## 2022-07-05 RX ORDER — FUROSEMIDE 10 MG/ML
60 INJECTION INTRAMUSCULAR; INTRAVENOUS ONCE
Status: COMPLETED | OUTPATIENT
Start: 2022-07-05 | End: 2022-07-05

## 2022-07-05 RX ADMIN — DEXMEDETOMIDINE HYDROCHLORIDE 1.1 MCG/KG/HR: 4 INJECTION, SOLUTION INTRAVENOUS at 09:03

## 2022-07-05 RX ADMIN — ALBUTEROL SULFATE 2.5 MG: 2.5 SOLUTION RESPIRATORY (INHALATION) at 07:53

## 2022-07-05 RX ADMIN — MIDODRINE HYDROCHLORIDE 5 MG: 5 TABLET ORAL at 13:32

## 2022-07-05 RX ADMIN — ANTI-FUNGAL POWDER MICONAZOLE NITRATE TALC FREE: 1.42 POWDER TOPICAL at 21:38

## 2022-07-05 RX ADMIN — MAGNESIUM GLUCONATE 500 MG ORAL TABLET 400 MG: 500 TABLET ORAL at 09:06

## 2022-07-05 RX ADMIN — DEXMEDETOMIDINE HYDROCHLORIDE 1.3 MCG/KG/HR: 4 INJECTION, SOLUTION INTRAVENOUS at 21:39

## 2022-07-05 RX ADMIN — ALBUTEROL SULFATE 2.5 MG: 2.5 SOLUTION RESPIRATORY (INHALATION) at 01:31

## 2022-07-05 RX ADMIN — FENTANYL CITRATE 50 MCG: 50 INJECTION, SOLUTION INTRAMUSCULAR; INTRAVENOUS at 04:13

## 2022-07-05 RX ADMIN — FERROUS SULFATE TAB EC 325 MG (65 MG FE EQUIVALENT) 325 MG: 325 (65 FE) TABLET DELAYED RESPONSE at 13:32

## 2022-07-05 RX ADMIN — SODIUM CHLORIDE: 9 INJECTION, SOLUTION INTRAVENOUS at 15:34

## 2022-07-05 RX ADMIN — FUROSEMIDE 60 MG: 10 INJECTION, SOLUTION INTRAMUSCULAR; INTRAVENOUS at 16:04

## 2022-07-05 RX ADMIN — ALBUTEROL SULFATE 2.5 MG: 2.5 SOLUTION RESPIRATORY (INHALATION) at 20:03

## 2022-07-05 RX ADMIN — SODIUM CHLORIDE, PRESERVATIVE FREE 10 MG: 5 INJECTION INTRAVENOUS at 09:05

## 2022-07-05 RX ADMIN — MORPHINE SULFATE 2 MG: 2 INJECTION, SOLUTION INTRAMUSCULAR; INTRAVENOUS at 15:05

## 2022-07-05 RX ADMIN — SODIUM CHLORIDE 10 ML/HR: 9 INJECTION, SOLUTION INTRAVENOUS at 04:52

## 2022-07-05 RX ADMIN — FENTANYL CITRATE 50 MCG: 50 INJECTION, SOLUTION INTRAMUSCULAR; INTRAVENOUS at 09:05

## 2022-07-05 RX ADMIN — MORPHINE SULFATE 2 MG: 2 INJECTION, SOLUTION INTRAMUSCULAR; INTRAVENOUS at 19:49

## 2022-07-05 RX ADMIN — Medication 1 EACH: at 15:14

## 2022-07-05 RX ADMIN — ATORVASTATIN CALCIUM 40 MG: 80 TABLET, FILM COATED ORAL at 08:58

## 2022-07-05 RX ADMIN — SODIUM CHLORIDE, PRESERVATIVE FREE 10 ML: 5 INJECTION INTRAVENOUS at 19:50

## 2022-07-05 RX ADMIN — MAGNESIUM GLUCONATE 500 MG ORAL TABLET 400 MG: 500 TABLET ORAL at 21:38

## 2022-07-05 RX ADMIN — Medication 81 MG: at 08:57

## 2022-07-05 RX ADMIN — FENTANYL CITRATE 50 MCG: 50 INJECTION, SOLUTION INTRAMUSCULAR; INTRAVENOUS at 00:06

## 2022-07-05 RX ADMIN — ALBUTEROL SULFATE 2.5 MG: 2.5 SOLUTION RESPIRATORY (INHALATION) at 15:38

## 2022-07-05 RX ADMIN — FENTANYL CITRATE 50 MCG: 50 INJECTION, SOLUTION INTRAMUSCULAR; INTRAVENOUS at 23:05

## 2022-07-05 RX ADMIN — ANTI-FUNGAL POWDER MICONAZOLE NITRATE TALC FREE: 1.42 POWDER TOPICAL at 09:06

## 2022-07-05 RX ADMIN — DEXMEDETOMIDINE HYDROCHLORIDE 1.1 MCG/KG/HR: 4 INJECTION, SOLUTION INTRAVENOUS at 15:34

## 2022-07-05 RX ADMIN — LEVOTHYROXINE SODIUM 25 MCG: 25 TABLET ORAL at 06:13

## 2022-07-05 RX ADMIN — FENTANYL CITRATE 50 MCG: 50 INJECTION, SOLUTION INTRAMUSCULAR; INTRAVENOUS at 18:14

## 2022-07-05 RX ADMIN — FENTANYL CITRATE 50 MCG: 50 INJECTION, SOLUTION INTRAMUSCULAR; INTRAVENOUS at 13:35

## 2022-07-05 RX ADMIN — SODIUM CHLORIDE, PRESERVATIVE FREE 10 ML: 5 INJECTION INTRAVENOUS at 09:07

## 2022-07-05 ASSESSMENT — PULMONARY FUNCTION TESTS
PIF_VALUE: 18
PIF_VALUE: 22
PIF_VALUE: 19
PIF_VALUE: 14
PIF_VALUE: 18
PIF_VALUE: 13
PIF_VALUE: 18
PIF_VALUE: 19
PIF_VALUE: 19

## 2022-07-05 NOTE — CARE COORDINATION
KARLY following for HD needs. Patient intubated/sedated. Referral to [facility] MWF accepted if patient OP HD appropriate once extubated. SW provided update to Scarlet at [facility]. Will need perm cath report once placed. Will continue to follow. [address] with patient's daughter Terra Servin to introduce self and confirm OP HD plan. Terra Servin confirmed request to [facility] as it is closest location to home. Will continue to follow.

## 2022-07-05 NOTE — PROGRESS NOTES
Britt Wound Ostomy Continence Nurse  Follow Up      NAME:  Marian Li  MEDICAL RECORD NUMBER:  [id number]  AGE: 66 y.o. GENDER: female  : 1950  TODAY'S DATE:  2022    Subjective:   Reason for WOCN Evaluation and Assessment: \"b/l lower extremity wounds\"      Marian Li is a 66 y.o. female referred by:   [x]? Physician  []? Nursing  []? Other:      Wound Identification:  Wound Type: venous and pressure  Contributing Factors: venous stasis, chronic pressure, decreased mobility, shear force and decreased tissue oxygenation      Twice weekly Unna's Boots dressing initiated 2022 by the [facility] due to rapid recurrence of venous stasis wounds when standard compression garments trialed. Patient also developed a sacral pressure injury prior to admission. 2022: deep tissue injury developed[ed around and through the area of pre-existing full thickness sacrococcygeal injury. The BLE venous ulcers are healing well.                 Objective:      BP (!) 112/43   Pulse 72   Temp 97.7 °F (36.5 °C) (Esophageal)   Resp 20   Ht 4' 11\" (1.499 m)   Wt 151 lb 0.2 oz (68.5 kg)   SpO2 95%   BMI 30.50 kg/m²   Jackson Risk Score: Jackson Scale Score: 14    LABS    CBC:   Lab Results   Component Value Date/Time    WBC 7.8 2022 03:54 AM    RBC 2.37 2022 03:54 AM    HGB 7.0 2022 10:57 AM     CMP:  Albumin:    Lab Results   Component Value Date/Time    LABALBU 2.6 2022 02:59 PM     PT/INR:    Lab Results   Component Value Date/Time    PROTIME 10.8 2022 02:56 AM    INR 1.0 2022 02:56 AM     HgBA1c:    Lab Results   Component Value Date/Time    LABA1C 4.6 2022 11:21 AM     PTT: No components found for: LABPTT      Assessment:       Measurements:     22 1520   Wound 22 Right Wound #1 right lower  leg cluster- converged with #2   Date First Assessed: 22   Present on Hospital Admission: Yes  Wound Approximate Age at First Assessment (Weeks): 0.5 weeks  Primary Wound Type: Venous Ulcer  Wound Location Orientation: Right  Wound Description (Comments): Wound #1 right lower  . .. Wound Image    Wound Etiology Venous   Dressing Status New dressing applied   Wound Cleansed Soap and water;Cleansed with saline   Dressing/Treatment Collagen;Hydrofiber Ag;ABD;Other (comment)  (calamine Unna's Boot)   Dressing Change Due 07/12/22   Wound Length (cm) 3.5 cm   Wound Width (cm) 1.2 cm   Wound Depth (cm) 0.1 cm   Wound Surface Area (cm^2) 4.2 cm^2   Change in Wound Size % (l*w) 72   Wound Volume (cm^3) 0.42 cm^3   Wound Healing % 72   Wound Assessment Pink/red;Superficial   Drainage Amount Moderate   Drainage Description Serosanguinous   Odor None   Marian-wound Assessment Intact; Hyperpigmented   Wound 05/20/22 Left;Proximal Wound # 4 Left lower lat leg   Date First Assessed: 05/20/22   Present on Hospital Admission: Yes  Wound Approximate Age at First Assessment (Weeks): 0.5 weeks  Primary Wound Type: Venous Ulcer  Wound Location Orientation: Left;Proximal  Wound Description (Comments): Wound # 4 Left. .. Wound Image    Wound Etiology Venous   Dressing Status New dressing applied   Wound Cleansed Soap and water;Cleansed with saline   Dressing/Treatment Collagen;Hydrofiber Ag; Other (comment)  (calamine Unna's Boot)   Dressing Change Due 07/12/22   Wound Length (cm) 5.7 cm  (cluster of two wounds)   Wound Width (cm) 2.2 cm   Wound Depth (cm) 0.1 cm   Wound Surface Area (cm^2) 12.54 cm^2   Change in Wound Size % (l*w) 15.27   Wound Volume (cm^3) 1. 254 cm^3   Wound Healing % 15   Wound Assessment Pink/red;Fibrinous   Drainage Amount Moderate   Drainage Description Serosanguinous   Odor None   Marian-wound Assessment Intact; Hyperpigmented   Wound 06/27/22 Buttocks Right;Mid small open pale wound   Date First Assessed/Time First Assessed: 06/27/22 1630   Primary Wound Type: Pressure Injury  Location: Buttocks  Wound Location Orientation: Right;Mid  Wound Description (Comments): small open pale wound   Wound Image    Wound Etiology Deep tissue/Injury   Dressing Status New dressing applied   Wound Cleansed Cleansed with saline   Dressing/Treatment Zinc paste   Dressing Change Due 07/05/22   Wound Length (cm) 3 cm   Wound Width (cm) 3 cm   Wound Depth (cm) 0.1 cm   Wound Surface Area (cm^2) 9 cm^2   Change in Wound Size % (l*w) -50   Wound Volume (cm^3) 0.9 cm^3   Wound Assessment Dusky; Purple/maroon   Drainage Amount Scant   Drainage Description Serosanguinous   Odor None   Marian-wound Assessment Intact   Wound 07/05/22 Foot Left;Lateral   Date First Assessed/Time First Assessed: 07/05/22 1522   Primary Wound Type: Pressure Injury  Location: Foot  Wound Location Orientation: Left;Lateral   Wound Image    Wound Etiology Deep tissue/Injury   Dressing Status New dressing applied   Wound Cleansed Soap and water   Dressing/Treatment Barrier film;ABD;Other (comment)  (calamine Unna's Boot)   Dressing Change Due 07/12/22   Wound Length (cm) 0.3 cm   Wound Width (cm) 0.6 cm   Wound Depth (cm) 0 cm   Wound Surface Area (cm^2) 0.18 cm^2   Wound Volume (cm^3) 0 cm^3   Wound Assessment Dusky; Purple/maroon   Drainage Amount None   Odor None   Marian-wound Assessment Blanchable erythema; Intact            Response to treatment:  Well tolerated by patient.            Plan:      Plan of Care: Turn every 2 hours  Float heels off of bed with pillows under calves     Use lift sling to reposition patient to minimize potential for shear injury.     Sacrococcygeal wounds: Zinc oxide paste BID and prn incontinence care.     Routine incontinence care with incontinence barrier cloths   Moisture wicking under pads vs cloth backed briefs     BLE Unna's Boots compression dressings. Leave in place for one week. Notify the [id number] [facility] [address] RN on-call via [facility] for any concerns or drainage strike-through.         Specialty Bed Required : Yes   []? Low Air Loss   []? Pressure Redistribution  []? Fluid Immersion  []? Bariatric  []? Total Pressure Relief  []? Other:      Discharge Plan:  TBD     Patient/Caregiver Teaching:  D/w daughter at bedside  []? Indicates understanding                []? Needs reinforcement  []? Unsuccessful                                  [x]? Verbal Understanding  []? Demonstrated understanding        []? No evidence of learning  []?  Refused teaching                           []? N/A    Electronically signed by Kanchan Carpenter RN, CWON on 7/5/2022 at 5:32 PM

## 2022-07-05 NOTE — PROGRESS NOTES
Comprehensive Nutrition Assessment    Type and Reason for Visit:  Reassess    Nutrition Recommendations/Plan:   1. Continue current Renal TF at goal of 35 mL/hr. Monitor TF tolerance/adequacy of intakes - modify as needed. 2. Will monitor labs, weights, and plan of care. Nutrition Assessment:    Pt remains on the vent. Tolerating Renal TF at goal rate of 35 mL/hr without issues per RN. Last BM 7/4. Labs reviewed: Na 133 mmol/L. Meds reviewed. Nutrition Related Findings:    labs/meds reviewed. Last BM 7/4. Wound Type: Pressure Injury,Stage III (to right buttocks; venous ulcers/wounds to bilateral legs)       Current Nutrition Intake & Therapies:    Average Meal Intake: NPO  Average Supplements Intake: NPO  Diet NPO  ADULT TUBE FEEDING; Orogastric; Renal Formula; Continuous; 15; Yes; 10; Q 4 hours; 35; 30; Q 4 hours  Current Tube Feeding (TF) Orders:  · Feeding Route: Orogastric  · Formula: Renal Formula  · Schedule: Continuous  · Feeding Regimen: 35 mL/hr  · Water Flushes: per MD  · Current TF & Flush Orders Provides: 35 mL/bw=6626 kcal and 68 g pro/day  · Goal TF & Flush Orders Provides: 35 mL/yn=6873 kcal and 68 g pro/day    Anthropometric Measures:  Height: 4' 11\" (149.9 cm)  Ideal Body Weight (IBW): 95 lbs (43 kg)    Admission Body Weight: 151 lb 1.7 oz (68.5 kg)  Current Body Weight: 151 lb 0.2 oz (68.5 kg), 159 % IBW. Weight Source: Bed Scale  Current BMI (kg/m2): 30.5                          BMI Categories: Obese Class 1 (BMI 30.0-34. 9)    Estimated Daily Nutrient Needs:  Energy Requirements Based On: Kcal/kg  Weight Used for Energy Requirements: Current  Energy (kcal/day): 5755-9745 kcals/day  Weight Used for Protein Requirements: Ideal  Protein (g/day): 65 g pro/day  Fluid (ml/day): per MD    Nutrition Diagnosis:   · Inadequate oral intake related to impaired respiratory function as evidenced by NPO or clear liquid status due to medical condition,nutrition support - enteral nutrition    Nutrition Interventions:   Food and/or Nutrient Delivery: Continue Current Tube Feeding  Nutrition Education/Counseling: No recommendation at this time  Coordination of Nutrition Care: Continue to monitor while inpatient  Plan of Care discussed with: RN    Goals:  Previous Goal Met: Goal(s) Achieved  Goals: Meet at least 75% of estimated needs,within 7 days       Nutrition Monitoring and Evaluation:   Behavioral-Environmental Outcomes: None Identified  Food/Nutrient Intake Outcomes: Enteral Nutrition Intake/Tolerance  Physical Signs/Symptoms Outcomes: Biochemical Data,GI Status,Fluid Status or Edema,Nutrition Focused Physical Findings,Skin,Weight    Discharge Planning:     Too soon to determine     Maggy Bermudez, [address] [place] [address],   Contact: [phone]

## 2022-07-05 NOTE — PLAN OF CARE
Problem: Respiratory - Adult  Goal: Achieves optimal ventilation and oxygenation  7/4/2022 2010 by Vijaya Miner RCP  Outcome: Progressing  Flowsheets (Taken 7/4/2022 2010)  Achieves optimal ventilation and oxygenation:   Assess for changes in respiratory status   Position to facilitate oxygenation and minimize respiratory effort   Respiratory therapy support as indicated   Assess the need for suctioning and aspirate as needed   Assess for changes in mentation and behavior   Oxygen supplementation based on oxygen saturation or arterial blood gases

## 2022-07-05 NOTE — PLAN OF CARE
Problem: Discharge Planning  Goal: Discharge to home or other facility with appropriate resources  7/5/2022 5086 by Carroll Snow RN  Outcome: Progressing  Flowsheets (Taken 7/4/2022 2000 by Barry Osborne, RN)  Discharge to home or other facility with appropriate resources: Identify barriers to discharge with patient and caregiver  7/4/2022 1944 by Barry Osborne, RN  Outcome: Progressing

## 2022-07-05 NOTE — PROGRESS NOTES
inhaler, Inhale 2 puffs into the lungs 2 times daily Maintenance inhaler  amLODIPine (NORVASC) 10 MG tablet, Take 1 tablet by mouth nightly  Blood Pressure KIT, Diagnosis: HTN. Needs to check blood pressure 1-2 times a day until stable, then once a day. Goal blood pressure less than 135/85, and above 110/60. vitamin D (ERGOCALCIFEROL) 1.25 MG (47512 UT) CAPS capsule, Take 1 capsule by mouth once a week Sundays  amiodarone (CORDARONE) 200 MG tablet, Take 1 tablet by mouth daily  Nebulizers (COMPRESSOR/NEBULIZER) MISC, Nebulizer with compressor. Disposable Med Nebs 2 /month. Reusable Med Nebs 1 per 6 months. Aerosol Mask 1 per month. Replacement Filters 2 per month. All other related supplies as needed per month. Medications being used: Albuterol . 083 unit dose vial. Frequency: every 6 hrs x 4/day . Length of Need: 1 (Patient not taking: Reported on 6/17/2022)  albuterol (PROVENTIL) (2.5 MG/3ML) 0.083% nebulizer solution, Take 3 mLs by nebulization every 6 hours as needed for Wheezing or Shortness of Breath (Patient not taking: Reported on 6/17/2022)  Misc. Devices (ADJUST FOLD CANE/YORK HANDLE) MISC, Use daily for walking  Handicap Placard MISC, by Does not apply route Expires on 12/30/25  atorvastatin (LIPITOR) 40 MG tablet, Take 1 tablet by mouth once daily  desloratadine (CLARINEX) 5 MG tablet, Take 1 tablet by mouth once daily  FreeStyle Lancets MISC, 1 each by Does not apply route daily Patient needs to contact office before any further refills will be approved  magnesium oxide (MAG-OX) 400 (241.3 Mg) MG TABS tablet, Take 1 tablet by mouth twice daily  miconazole (MICOTIN) 2 % powder, Apply topically 2 times daily UNDER THE SKIN FOLDS LONG TERM  Multiple Vitamins-Minerals (THERAPEUTIC MULTIVITAMIN-MINERALS) tablet, Take 1 tablet by mouth daily  blood glucose test strips (FREESTYLE LITE) strip, 1 each by In Vitro route daily As needed.   Blood Pressure KIT, 1 kit by Does not apply route three times daily (Patient not taking: Reported on 6/17/2022)  blood glucose monitor kit and supplies, 1 kit by Other route three times daily Dispense Butterfly Elite CBG Device    Current Medications:     Scheduled Meds:    gelocast unnas boot  1 each Other Once    famotidine (PEPCID) injection  10 mg IntraVENous Daily    levothyroxine  25 mcg Oral Daily    lidocaine 1 % injection  5 mL IntraDERmal Once    sodium chloride flush  5-40 mL IntraVENous 2 times per day    midodrine  5 mg Per NG tube TID WC    sodium polystyrene  15 g Oral Once    [Held by provider] amiodarone  100 mg Oral Daily    rocuronium  0.6 mg/kg IntraVENous Once    albuterol  2.5 mg Nebulization Q6H    sodium chloride flush  5-40 mL IntraVENous 2 times per day    aspirin EC  81 mg Oral Daily    atorvastatin  40 mg Oral Daily    magnesium oxide  400 mg Oral BID    miconazole   Topical BID    ferrous sulfate  325 mg Oral Daily with breakfast     Continuous Infusions:    sodium chloride      dexmedetomidine 1.1 mcg/kg/hr (07/05/22 0903)    sodium chloride      norepinephrine Stopped (07/02/22 0516)    dextrose      sodium chloride 10 mL/hr at 07/05/22 0700    heparin (PORCINE) Infusion Stopped (07/05/22 0454)     PRN Meds:  sodium chloride flush, sodium chloride, sodium chloride, fentanNYL, heparin (porcine), heparin (porcine), glucose, dextrose bolus **OR** dextrose bolus, glucagon (rDNA), dextrose, sodium chloride flush, sodium chloride, ondansetron **OR** ondansetron, polyethylene glycol, acetaminophen **OR** acetaminophen, heparin (porcine), heparin (porcine), morphine    Input/Output:       I/O last 3 completed shifts: In: 2520.5 [I.V.:1130.5; NG/GT:1090]  Out: 2800 [Urine:500].       Patient Vitals for the past 96 hrs (Last 3 readings):   Weight   07/05/22 0453 151 lb 0.2 oz (68.5 kg)   07/04/22 1752 151 lb 10.8 oz (68.8 kg)   07/04/22 1420 155 lb 6.8 oz (70.5 kg)       Vital Signs:   Temperature:  Temp: 97.7 °F (36.5 °C)  TMax:   Temp (24hrs), Av.5 °F (36.9 °C), Min:97.5 °F (36.4 °C), Max:99.7 °F (37.6 °C)    Respirations:  Resp: 19  Pulse:   Heart Rate: 62  BP:    BP: (!) 124/42  BP Range: Systolic (13QUV), DBS:865 , Min:88 , UPD:824       Diastolic (45XHN), ERC:59, Min:42, Max:104      Physical Examination:     General:  On mechanical ventilation, sedated. HEENT: Atraumatic, normocephalic, no throat congestion, moist mucosa. Eyes:   Pupils equal, round and reactive to light. Neck:   Supple  Chest:   Bilateral vesicular breath sounds, no rales or wheezes. Cardiac:  S1 S2 RR, no murmurs, gallops or rubs. Abdomen: Soft, no masses or organomegaly, BS audible. :   No suprapubic or flank tenderness. Neuro: On mechanical ventilation, sedated. SKIN:  No rashes, good skin turgor. Extremities:  +ve dependent and trace LE edema. Labs:       Recent Labs     22  0435 22  1857 22  0006 22  0006 22  1815 22  0354 22  1057   WBC 7.5  --  7.1  --   --  7.8  --    RBC 2.76*  --  2.48*  --   --  2.37*  --    HGB 8.1*   < > 7.5*   < > 7.6* 7.0* 7.0*   HCT 28.1*   < > 25.0*   < > 25.6* 22.9* 23.3*   .8  --  100.8  --   --  96.6  --    MCH 29.3  --  30.2  --   --  29.5  --    MCHC 28.8  --  30.0  --   --  30.6  --    RDW 16.3*  --  16.4*  --   --  16.4*  --    *  --  109*  --   --  See Reflexed IPF Result  --    MPV 9.9  --  10.2  --   --   --   --     < > = values in this interval not displayed.       BMP:   Recent Labs     22  0435 22  0006 22  0354   * 130* 133*   K 4.1 3.8 4.2    97* 99   CO2 25 26 29   BUN 40* 51* 29*   CREATININE 1.78* 2.10* 1.49*   GLUCOSE 158* 169* 146*   CALCIUM 8.0* 7.9* 7.6*     YRIS:      Lab Results   Component Value Date/Time    YRIS NEGATIVE 10/27/2020 04:37 PM     SPEP:  Lab Results   Component Value Date/Time    PROT 5.3 2022 02:59 PM    ALBCAL 2.5 2022 03:00 PM    ALBPCT 48 2022 03:00 PM    LABALPH 0.3 2022 03:00 PM    LABALPH 1.2 06/01/2022 03:00 PM    A1PCT 6 06/01/2022 03:00 PM    A2PCT 23 06/01/2022 03:00 PM    LABBETA 0.7 06/01/2022 03:00 PM    BETAPCT 13 06/01/2022 03:00 PM    GAMGLOB 0.5 06/01/2022 03:00 PM    GGPCT 10 06/01/2022 03:00 PM    PATH ELECTRONICALLY SIGNED. Ava Aguilar M.D. 06/01/2022 03:00 PM    PATH ELECTRONICALLY SIGNED. Ava Aguilar M.D. 06/01/2022 03:00 PM     UPEP:     Lab Results   Component Value Date/Time    LABPE  10/27/2020 04:35 PM     ELEVATED PROTEIN CONCENTRATION. MOST SERUM PROTEINS ARE DETECTED IN THIS URINE.   USUALLY OBSERVED WITH MARKEDLY INCREASED NON-SELECTIVE GLOMERULAR PERMEABILITY (i.e. SEVERE GLOMERULAR DISEASE), CONTAMINATION OF     C3:     Lab Results   Component Value Date/Time    C3 147 10/27/2020 04:37 PM     C4:     Lab Results   Component Value Date/Time    C4 29 10/27/2020 04:37 PM     MPO ANCA:     Lab Results   Component Value Date/Time    MPO 5 10/27/2020 04:37 PM     PR3 ANCA:     Lab Results   Component Value Date/Time    PR3 16 10/27/2020 04:37 PM     Hep BsAg:         Lab Results   Component Value Date/Time    HEPBSAG NONREACTIVE 06/28/2022 02:56 PM     Hep C AB:          Lab Results   Component Value Date/Time    HEPCAB NONREACTIVE 06/28/2022 02:56 PM     Urinalysis/Chemistries:      Lab Results   Component Value Date/Time    NITRU NEGATIVE 06/27/2022 06:35 PM    COLORU Dark Yellow 06/27/2022 06:35 PM    PHUR 5.5 06/27/2022 06:35 PM    WBCUA 20 TO 50 06/27/2022 06:35 PM    RBCUA 20 TO 50 06/27/2022 06:35 PM    MUCUS 1+ 06/27/2022 06:35 PM    TRICHOMONAS NOT REPORTED 02/07/2022 02:42 AM    YEAST NOT REPORTED 02/07/2022 02:42 AM    BACTERIA FEW 06/12/2022 09:33 PM    SPECGRAV 1.030 06/27/2022 06:35 PM    LEUKOCYTESUR SMALL 06/27/2022 06:35 PM    UROBILINOGEN Normal 06/27/2022 06:35 PM    BILIRUBINUR NEGATIVE 06/27/2022 06:35 PM    GLUCOSEU 1+ 06/27/2022 06:35 PM    KETUA TRACE 06/27/2022 06:35 PM    AMORPHOUS NOT REPORTED 02/07/2022 02:42 AM     Urine Sodium:     Lab Results   Component Value Date/Time    SLOAN <20 06/08/2022 09:52 PM      Urine Creatinine:     Lab Results   Component Value Date/Time    LABCREA 63.0 06/08/2022 09:52 PM     Radiology:     Reviewed. Assessment:     1. JOY currently HD dependent, likely secondary to ATN from hypotension requiring pressor support vs progression from underlying diabetic nephropathy, getting dialysis as per MWF schedule. Hemodialysis initiated on 6/28/2022. 2. Hyperkalemia likely from progressive decline in renal function, now improved with dialysis. 3. Nephrotic range proteinuria likely due to diabetic nephropathy. 4. Diabetes type 2.  5. Currently on mechanical ventilation. 6. Nonhealing lower extremity ulcers. 7. Hypotension did require pressor support this admission. 8. Fluid overload    Plan:   1. No acute need for dialysis today. Will check for dialysis in a.m. currently on MWF schedule. 2. Will likely require tunneled catheter placement if continues to require HD. 3. Will give a trial of Lasix 60 mg IV today. 4. Monitor strict I/Os and renal function for signs of improvement. 5. Bladder scan every shift and St. Cath if >250 cc.   6. BMP in AM.  7.  working towards setting up outpatient dialysis spot. 8. Will follow. Nutrition   Please ensure that patient is on a renal diet/TF. Avoid nephrotoxic drugs/contrast exposure. Vamsi Gray MD  [facility]     This note is created with the assistance of a speech-recognition program. While intending to generate a document that actually reflects the content of the visit, no guarantees can be provided that every mistake has been identified and corrected by editing.

## 2022-07-05 NOTE — PROGRESS NOTES
Critical Care Team - Daily Progress Note      Date and time: 7/5/2022 9:04 AM  Patient's name:  Unique Barajas  Medical Record Number: [id number]  Patient's account/billing number: [de-identified]  Patient's YOB: 1950  Age: 66 y.o. Date of Admission: 6/27/2022 12:28 PM  Length of stay during current admission: 8      Primary Care Physician: Kristi Clark MD  ICU Attending Physician: Dr. Will Vizcarra Status: Full Code    Reason for ICU admission:   Chief Complaint   Patient presents with    Bradycardia       Subjective:     OVERNIGHT EVENTS:  No significant events overnight    Today:  · Overnight hemoglobin was 7, so they stopped heparin . No new signs of bleed   · Hemodynamically stable, afebrile overnight ,off pressors (was on norepi and dopamine at one point)  · -tolerating tube feeds. · Continue to remain intubated and sedated with precedex 1.1   · -vent settings: 26/350/5/30%. Came down on fio2   · abg pH 7.46/38.6/93.1/28  · dialysis yesterday 2 l removed ,new dialysis with progression of diabetic nephropathy   · Blood cultures and urine cultures are negative- cefepime monitored off   · na 136>134>134>130  · wbc 6.8>7.5>7.1>7.8  · hgb stable 8.1>8.2>8.3>7.8>8.1 >7.5>7  · heparin for PAF. on hold as of now   · amiodarone on hold as barely HR in 60-61 and becomes bradycardic on amiodarone  · nephro on board       Plan :  · SBT  · Repeat h and h and then restart heparin if needed . · Reilly H&H was 7 at 11 AM, no visible signs of bleeding will hold onto heparin as of now         Patient was found to have a heart rate as low as 35 and was started on transcutaneous pacing and route and was given a total of 2 mg atropine. Patient was most recently discharged from [facility] this month for similar episodes of bradycardia. Cardiology evaluated during that admission and attributed the bradycardia to medication and adjusted her metoprolol.   Additionally she was discharged with a Holter monitor with results suggestive of normal sinus with some PACs. Patient is on Eliquis at home for atrial fibrillation and most recent echo was done in 2022 with an EF of 55%. At home on elaquis , metoprolol,amio 200, amlodipine ,hydralazine   AWAKE & FOLLOWING COMMANDS:  [x] No (, opens eyes to verbal commands) [] Yes    CURRENT VENTILATION STATUS:     [x] Ventilator  [] BIPAP  [] Nasal Cannula [] Room Air          SECRETIONS Amount:  [] Small [] Moderate  [] Large  [x] None  Color:     [] White [] Colored  [] Bloody    SEDATION:  RAAS Score:  [] Propofol gtt  [] Versed gtt  [] Ativan gtt   [] No Sedation PRECEDEX    PARALYZED:  [x] No    [] Yes    DIARRHEA:                [x] No                [] Yes  (C. Difficile status: [] positive                                                                                                                       [] negative                                                                                                                     [] pending)    VASOPRESSORS:  [x] No    [] Yes    If yes -    [] Levophed       [] Dopamine     [] Vasopressin       [] Dobutamine  [] Phenylephrine         [] Epinephrine    CENTRAL LINES:     [] No   [x] Yes   (Date of Insertion:   )           If yes -     [x] Right IJ     [] Left IJ [] Right Femoral [] Left Femoral                   [] Right Subclavian [] Left Subclavian       GUTIERREZ'S CATHETER:   [] No   [x] Yes  (Date of Insertion:   )     URINE OUTPUT:            [] Good   [] Low              [x] Anuric    REVIEW OF SYSTEMS:  Unable to obtain due to patient mentation.      OBJECTIVE:     VITAL SIGNS:  BP (!) 106/47   Pulse 77   Temp 97.9 °F (36.6 °C) (Esophageal)   Resp 19   Ht 4' 11\" (1.499 m)   Wt 151 lb 0.2 oz (68.5 kg)   SpO2 (!) 88% Comment: pt recovering from suctioning  BMI 30.50 kg/m²   Tmax over 24 hours:  Temp (24hrs), Av.5 °F (36.9 °C), Min:97.5 °F (36.4 °C), Max:99.7 °F (37.6 °C)      Patient Vitals for the past 8 hrs:   BP Temp Temp src Pulse Resp SpO2 Weight   07/05/22 0748 -- -- -- 77 19 (!) 88 % --   07/05/22 0701 -- -- -- 60 23 -- --   07/05/22 0700 (!) 106/47 97.9 °F (36.6 °C) Esophageal 60 23 99 % --   07/05/22 0600 110/86 98.4 °F (36.9 °C) Esophageal 75 25 98 % --   07/05/22 0500 (!) 113/44 -- -- 66 23 99 % --   07/05/22 0453 -- -- -- -- -- -- 151 lb 0.2 oz (68.5 kg)   07/05/22 0400 (!) 115/45 99.7 °F (37.6 °C) Esophageal 69 25 98 % --   07/05/22 0307 -- -- -- 70 27 98 % --   07/05/22 0300 (!) 118/48 -- -- 71 24 98 % --   07/05/22 0200 (!) 116/49 99.3 °F (37.4 °C) Esophageal 72 23 98 % --         Intake/Output Summary (Last 24 hours) at 7/5/2022 0904  Last data filed at 7/5/2022 0700  Gross per 24 hour   Intake 1651.84 ml   Output 2800 ml   Net -1148.16 ml     Date 07/05/22 0000 - 07/05/22 2359   Shift 5023-2712 2389-2821 5942-3839 24 Hour Total   INTAKE   I.V.(mL/kg) 243.5(3.6)   243.5(3.6)   NG/GT(mL/kg) 146(2.1)   146(2.1)   Shift Total(mL/kg) 389. 5(5.7)   389. 5(5.7)   OUTPUT   Urine(mL/kg/hr) 500(0.9)   500   Shift Total(mL/kg) 500(7.3)   500(7.3)   Weight (kg) 68.5 68.5 68.5 68.5     Wt Readings from Last 3 Encounters:   07/05/22 151 lb 0.2 oz (68.5 kg)   06/22/22 152 lb (68.9 kg)   06/21/22 152 lb (68.9 kg)     Body mass index is 30.5 kg/m². PHYSICAL EXAM:  Constitutional: Intubated, sedated, opens eyes to verbal commands  HEENT: PERRLA, EOMI, sclera clear, anicteric  Respiratory: Bilateral mechanical breath sounds  Cardiovascular: regular rate and rhythm, normal S1, S2, no murmur apprecuated on physical exam  Abdomen: soft, nontender, nondistended, no masses or organomegaly  NEUROLOGIC: not waking up for me int the morning  ,later was moving   Cranial nerves II-XII are grossly intact. Extremities:  peripheral pulses normal, 2+ pedal edema,.     MEDICATIONS:  Scheduled Meds:   famotidine (PEPCID) injection  10 mg IntraVENous Daily    levothyroxine  25 mcg Oral Daily    lidocaine 1 % injection  5 mL IntraDERmal Once    sodium chloride flush  5-40 mL IntraVENous 2 times per day    midodrine  5 mg Per NG tube TID WC    sodium polystyrene  15 g Oral Once    [Held by provider] amiodarone  100 mg Oral Daily    rocuronium  0.6 mg/kg IntraVENous Once    albuterol  2.5 mg Nebulization Q6H    sodium chloride flush  5-40 mL IntraVENous 2 times per day    aspirin EC  81 mg Oral Daily    atorvastatin  40 mg Oral Daily    magnesium oxide  400 mg Oral BID    miconazole   Topical BID    ferrous sulfate  325 mg Oral Daily with breakfast     Continuous Infusions:   sodium chloride      dexmedetomidine 1.1 mcg/kg/hr (07/05/22 0903)    sodium chloride      DOPamine      norepinephrine Stopped (07/02/22 0516)    midazolam Stopped (06/30/22 1545)    dextrose      sodium chloride 10 mL/hr at 07/05/22 0700    heparin (PORCINE) Infusion Stopped (07/05/22 0454)     PRN Meds:   sodium chloride flush, 5-40 mL, PRN  sodium chloride, 25 mL, PRN  sodium chloride, , PRN  fentanNYL, 50 mcg, Q2H PRN  heparin (porcine), 1,300 Units, PRN  heparin (porcine), 1,200 Units, PRN  glucose, 4 tablet, PRN  dextrose bolus, 125 mL, PRN   Or  dextrose bolus, 250 mL, PRN  glucagon (rDNA), 1 mg, PRN  dextrose, 100 mL/hr, PRN  sodium chloride flush, 5-40 mL, PRN  sodium chloride, , PRN  ondansetron, 4 mg, Q8H PRN   Or  ondansetron, 4 mg, Q6H PRN  polyethylene glycol, 17 g, Daily PRN  acetaminophen, 650 mg, Q6H PRN   Or  acetaminophen, 650 mg, Q6H PRN  heparin (porcine), 4,000 Units, PRN  heparin (porcine), 2,000 Units, PRN  morphine, 2 mg, Q4H PRN        SUPPORT DEVICES: [x] Ventilator [] BIPAP  [] Nasal Cannula [] Room Air    VENT SETTINGS (Comprehensive) (if applicable):  Vent Information  Ventilator ID: [id number]  Vent Mode: AC/PRVC  Additional Respiratory Assessments  Heart Rate: 77  Resp: 19  SpO2: (!) 88 % (pt recovering from suctioning)  End Tidal CO2: 46 (%)  Position: Semi-Leung's  Humidification Source: HME  Cuff Pressure (cm H2O):  (mov)    ABGs:     Lab Results   Component Value Date/Time    PHART 7.459 03/09/2022 04:28 AM    QYD9ETL 39.9 03/09/2022 04:28 AM    PO2ART 107.0 03/09/2022 04:28 AM    JRB7GZB 28.3 03/09/2022 04:28 AM    Z3IGHFMM 97.4 03/09/2022 04:28 AM    FIO2 30.0 07/05/2022 04:42 AM     Lactic Acid:   Lab Results   Component Value Date/Time    LACTA 0.7 03/04/2022 02:30 PM    LACTA 1.2 02/18/2021 01:00 AM    LACTA 1.8 07/25/2020 11:20 AM         DATA:  Complete Blood Count:   Recent Labs     07/03/22 0435 07/03/22  1857 07/04/22  0006 07/04/22  1815 07/05/22  0354   WBC 7.5  --  7.1  --  7.8   HGB 8.1*   < > 7.5* 7.6* 7.0*   .8  --  100.8  --  96.6   *  --  109*  --  See Reflexed IPF Result   RBC 2.76*  --  2.48*  --  2.37*   HCT 28.1*   < > 25.0* 25.6* 22.9*   MCH 29.3  --  30.2  --  29.5   MCHC 28.8  --  30.0  --  30.6   RDW 16.3*  --  16.4*  --  16.4*   MPV 9.9  --  10.2  --   --     < > = values in this interval not displayed. PT/INR:    Lab Results   Component Value Date/Time    PROTIME 10.8 06/14/2022 02:56 AM    INR 1.0 06/14/2022 02:56 AM     PTT:    Lab Results   Component Value Date/Time    APTT 32.6 07/05/2022 03:54 AM       Basal Metabolic Profile:   Recent Labs     07/03/22 0435 07/04/22  0006 07/05/22  0354   * 130* 133*   K 4.1 3.8 4.2   BUN 40* 51* 29*   CREATININE 1.78* 2.10* 1.49*    97* 99   CO2 25 26 29      Magnesium:   Lab Results   Component Value Date/Time    MG 2.3 06/09/2022 06:00 AM    MG 2.4 06/07/2022 09:45 PM    MG 2.3 05/18/2022 02:19 PM     Phosphorus:   Lab Results   Component Value Date/Time    PHOS 2.2 07/02/2022 05:11 AM    PHOS 4.4 06/15/2022 08:07 AM    PHOS 4.6 05/18/2022 02:19 PM     S. Calcium:  Recent Labs     07/05/22  0354   CALCIUM 7.6*     S. Ionized Calcium:No results for input(s): IONCA in the last 72 hours.       Urinalysis:   Lab Results   Component Value Date/Time    NITRU NEGATIVE 06/27/2022 06:35 PM    COLORU Dark Yellow 06/27/2022 06:35 PM    PHUR 5.5 06/27/2022 06:35 PM    WBCUA 20 TO 50 06/27/2022 06:35 PM    RBCUA 20 TO 50 06/27/2022 06:35 PM    MUCUS 1+ 06/27/2022 06:35 PM    TRICHOMONAS NOT REPORTED 02/07/2022 02:42 AM    YEAST NOT REPORTED 02/07/2022 02:42 AM    BACTERIA FEW 06/12/2022 09:33 PM    SPECGRAV 1.030 06/27/2022 06:35 PM    LEUKOCYTESUR SMALL 06/27/2022 06:35 PM    UROBILINOGEN Normal 06/27/2022 06:35 PM    BILIRUBINUR NEGATIVE 06/27/2022 06:35 PM    GLUCOSEU 1+ 06/27/2022 06:35 PM    KETUA TRACE 06/27/2022 06:35 PM    AMORPHOUS NOT REPORTED 02/07/2022 02:42 AM       CARDIAC ENZYMES: No results for input(s): CKMB, CKMBINDEX, TROPONINI in the last 72 hours. Invalid input(s): CKTOTAL;3  BNP: No results for input(s): BNP in the last 72 hours. LFTS  No results for input(s): ALKPHOS, ALT, AST, BILITOT, BILIDIR, LABALBU in the last 72 hours. AMYLASE/LIPASE/AMMONIA  Recent Labs     07/04/22  1450   AMMONIA 20       Last 3 Blood Glucose:   Recent Labs     07/03/22  0435 07/04/22  0006 07/05/22  0354   GLUCOSE 158* 169* 146*      HgBA1c:    Lab Results   Component Value Date/Time    LABA1C 4.6 03/31/2022 11:21 AM         TSH:    Lab Results   Component Value Date/Time    TSH 9.54 06/27/2022 12:44 PM     ANEMIA STUDIES  No results for input(s): LABIRON, TIBC, FERRITIN, IBOHDOYO31, FOLATE, OCCULTBLD in the last 72 hours.       Cultures during this admission:     Blood cultures:                 [] None drawn      [x] Negative             []  Positive (Details:  )  Urine Culture:                   [] None drawn      [x] Negative             []  Positive (Details:pend  )  Sputum Culture:               [] None drawn       [] Negative             []  Positive (Details:  )   Endotracheal aspirate:     [] None drawn       [] Negative             []  Positive (Details:  )        ASSESSMENT:     Principal Problem:    Bradycardia  Active Problems:    Lymphedema    Acute kidney injury superimposed on CKD (HCC)    Hyperkalemia Shock ([address]    Leukocytosis    Acute respiratory failure with hypoxia and hypercapnia (HCC)    Pulmonary edema cardiac cause (HCC)    Acute on chronic diastolic congestive heart failure (HCC)  Resolved Problems:    * No resolved hospital problems. *  Acute kidney injury versus progression of underlying kidney disease, oliguric dependent on dialysis  Hyperkalemia-resolved        PLAN:   Neurologic:   · Neuro checks per protocol  · Worsening mentatation in last two weeks before coming in per daughter. baseline status otherwise normal.   · CT of head performed in ED. Suggestive of 2.6 cm anterior right temporal meningioma with mild increase in vasogenic edema  · Previous CT's reviewed. · On seroquil at night  · Neurology signed off  with no new recommendations    Cardiovascular:RESOLVED         Sinus Bradycardia   ? Sinus bradycardia with hyperkalemia and hypothermia on admission  ? Patient is currently off of the dopamine gtt. and epinephrine  ? Continue amiodarone 100 mg daily as per cardiology recommendation(200 at [facility]-currently on hold as HR in 60-61 range-will resume as HR improves/as necessary)  ? Cardiology signed off  ? Continue to monitor      History of paroxysmal Atrial Fibrillation - CHADS VASC score of 6  · Patient is on Eliquis at home, hemodynamically stable, not in RVR. · Heparin is on hold due to low hb 7, no visible signs of bleeding, repeat hemoglobin was 7. Continue to hold Eliquis. .Chronic Diastolic Heart Failure  · Recent echo showed EF of 60 to 65%.       Pulmonary:  COPD   Patient was recently started on home oxygen 2 to 3 L. Continue to remain intubated and sedated with precedex 1.1          vent settings: 26/350/5/30%. Came down on fio2   Scattered infiltrates with bilateral effusions .   Off antibiotics     GI/Nutrition  · Glycolax PRN  · Ulcer Prophylaxis: famotidine 10 mg iv daily  given [facility] vent>48h+on antiplatelet agent (WAT44)  · Diet:On TF     Renal/Fluid/Electrolyte  JOY on CKD Stage 4  · Creatinine 2.13 on admission with baseline around 1.7  . 1.49 today  · Most likely pre-renal secondary to decreased perfusion due to pulmonary vascular congestion. · Patient got 3 session of HD  On 30th , 2nd and 4th   ,NEPHRO on board . · 1.49 today     ID  · Scattered infiltrates with bilateral pleural effusions present on chest x-ray . Patient upon appropriate antibiotics, cefepime for hospital versus community-acquired pneumonia. We will continue to trend white count, manage fevers if  present. White blood cell count stable   · 7/3/2022 monitor off antibiotics       Hematology:      · Recent Labs     06/27/22  1244   HGB 10.5*   · Stable. Daily CBC.      Endocrine:   ? Hx of diabetes mellitus not on any home medications  ? Most recent HbA1c 4.6 from 3/31/2022  ? Will continue to monitor-BGs controlled <180  ? On TF  ? tsh 9.4 on synthyroid 25     DVT Prophylaxis  ? Heparin on hold as of now . restart once hb stabilize        Avinash Palm MD,        Resident internal medicine, PGY-3  [address] ([facility])             7/5/2022, 9:04 AM

## 2022-07-05 NOTE — CARE COORDINATION
Transitional planning. Intubated/ Sedated FiO2 30%, PEEP of 8, failed wean today. follows commands, OG for TF- at goal, Wound Ostomy for Chronic Wounds, pt evaluated daily for HD. Has OP HD seat if appropriate. Accepted at [facility]- will start pre-cert when appropriate. Current w/ [facility]- Hilda w/ [facility] provided Medicaid information- pt out of SNF days with insurance.

## 2022-07-05 NOTE — PROGRESS NOTES
more fatigued than normal and the heart rate was found to be 35 and the patient was started on transcutaneous monitoring and atropine was given by EMS. The patient had a recent 48-hour Holter monitor showing sinus rhythm with some PACs earlier this month. The patient has required epinephrine for blood pressure support as this was switched to dopamine and Levophed which she continues currently. She is on BiPAP and currently nonconversant. She has developed acute kidney injury and will be started on hemodialysis. The chest x-ray shows findings consistent with pulmonary edema/congestion and the patient has been lethargic since admission. I was asked to evaluate for leukocytosis and bilateral lower extremity leg ulcers. The patient    Current evaluation:2022    BP (!) 108/40   Pulse 84   Temp 97.7 °F (36.5 °C) (Esophageal)   Resp 23   Ht 4' 11\" (1.499 m)   Wt 151 lb 0.2 oz (68.5 kg)   SpO2 95%   BMI 30.50 kg/m²     Temperature Range: Temp: 97.7 °F (36.5 °C) Temp  Av.4 °F (36.9 °C)  Min: 97.5 °F (36.4 °C)  Max: 99.7 °F (37.6 °C)  The patient remains on the ventilator at 30% FiO2 5 of PEEP. The patient is on Precedex for sedation. She is awake and alert has a 2 daughters in the room with her at the time of my evaluation. She did have a spontaneous breathing trial attempt earlier today and the respiratory therapist does report that she got quite agitated and tachypneic and did not last even an hour today. She is getting some fentanyl    Review of Systems   Unable to perform ROS: Intubated       Physical Examination :     Physical Exam  Constitutional:       Appearance: She is well-developed. Interventions: She is sedated and intubated. HENT:      Head: Normocephalic and atraumatic. Cardiovascular:      Rate and Rhythm: Regular rhythm. Heart sounds: Murmur heard. Pulmonary:      Effort: Pulmonary effort is normal. She is intubated. Breath sounds: Normal breath sounds. Abdominal:      General: Bowel sounds are normal.      Palpations: Abdomen is soft. Musculoskeletal:         General: Swelling (Bilateral upper extremities) present. Skin:     General: Skin is warm and dry. Comments: The legs and feet are currently in wrapped in Unna boots   Neurological:      Mental Status: She is oriented to person, place, and time. Laboratory data:   I have independently reviewed the followinglabs:  CBC with Differential:   Recent Labs     07/04/22  0006 07/04/22  1815 07/05/22  0354 07/05/22  1057   WBC 7.1  --  7.8  --    HGB 7.5*   < > 7.0* 7.0*   HCT 25.0*   < > 22.9* 23.3*   *  --  See Reflexed IPF Result  --    LYMPHOPCT 8*  --  12*  --    MONOPCT 17*  --  17*  --     < > = values in this interval not displayed. BMP:   Recent Labs     07/04/22  0006 07/05/22  0354   * 133*   K 3.8 4.2   CL 97* 99   CO2 26 29   BUN 51* 29*   CREATININE 2.10* 1.49*     Hepatic Function Panel:   No results for input(s): PROT, LABALBU, BILIDIR, IBILI, BILITOT, ALKPHOS, ALT, AST in the last 72 hours.       Lab Results   Component Value Date/Time    PROCAL 0.11 06/27/2022 12:44 PM    PROCAL 0.26 03/07/2022 05:53 AM     Lab Results   Component Value Date/Time    CRP <3.0 03/01/2022 10:07 PM    CRP <3.0 02/07/2022 06:23 AM     Lab Results   Component Value Date    SEDRATE 7 10/17/2017         Lab Results   Component Value Date/Time    DDIMER 0.71 07/25/2020 11:20 AM     Lab Results   Component Value Date/Time    FERRITIN 13 03/01/2022 10:07 PM    FERRITIN 155 07/25/2020 11:20 AM     Lab Results   Component Value Date/Time     03/10/2022 11:50 AM     07/25/2020 11:20 AM     Lab Results   Component Value Date/Time    FIBRINOGEN 610 07/25/2020 11:20 AM       Results in Past 30 Days  Result Component Current Result Ref Range Previous Result Ref Range   SARS-CoV-2, Rapid Not Detected (6/27/2022) Not Detected Not Detected (6/12/2022) Not Detected     Lab Results Component Value Date/Time    COVID19 Not Detected 06/27/2022 01:44 PM    COVID19 Not Detected 06/12/2022 06:34 PM    COVID19 Not Detected 03/01/2022 10:34 PM    COVID19 Not Detected 02/06/2022 09:46 AM    COVID19 Not Detected 07/25/2020 12:17 PM       No results for input(s): VANCOTROUGH in the last 72 hours. Imaging Studies:   ONE XRAY VIEW OF THE CHEST 7/3/2022 8:03 am  Impression   Mild increased perihilar edema, mild increased perihilar opacification,   likely edema and CHF.  Persistent small effusions and bibasilar atelectasis.       Satisfactory position of endotracheal tube.           Cultures:     Culture, Blood 1 [[id number]] Collected: 06/27/22 1311   Order Status: Completed Specimen: Blood Updated: 07/02/22 1331    Specimen Description . BLOOD    Special Requests NOT GIVEN    Culture NO GROWTH 5 DAYS   Culture, Blood 1 [[id number]] Collected: 06/27/22 1245   Order Status: Completed Specimen: Blood Updated: 07/02/22 1327    Specimen Description . BLOOD    Special Requests L AC 1ML    Culture NO GROWTH 5 DAYS       Culture, Urine [[id number]] Collected: 06/27/22 2007   Order Status: Completed Specimen: Urine Updated: 06/28/22 2106    Specimen Description . URINE    Culture NO GROWTH   MRSA DNA Probe, Nasal [[id number]] Collected: 06/27/22 1742   Order Status: Completed Specimen: Nasal Updated: 06/28/22 1041    Specimen Description . NASAL SWAB    MRSA, DNA, Nasal NEGATIVE    Comment: NEGATIVE:  MRSA DNA not detected by nucleic acid amplification.                                                     Results should be used as an adjunct to nosocomial control efforts to identify patients   needing enhanced precautions.     The test is not intended to identify patients with staphylococcal infections.  Results   should not be used to guide or monitor treatment for MRSA infections.        MRSA DNA Probe, Nasal [[id number]]    Order Status: Canceled Specimen: Nares    COVID-19, Rapid [[id number]] Collected: 06/27/22 1344   Order Status: Completed Specimen: Nasopharyngeal Swab Updated: 06/27/22 1411    Specimen Description . NASOPHARYNGEAL SWAB    SARS-CoV-2, Rapid Not Detected    Comment:        Rapid NAAT:  The specimen is NEGATIVE for SARS-CoV-2, the novel coronavirus associated with   COVID-19.         The ID NOW COVID-19 assay is designed to detect the virus that causes COVID-19 in patients   with signs and symptoms of infection who are suspected of COVID-19. An individual without symptoms of COVID-19 and who is not shedding SARS-CoV-2 virus would   expect to have a negative (not detected) result in this assay. Negative results should be treated as presumptive and, if inconsistent with clinical signs   and symptoms or necessary for patient management,   should be tested with an alternative molecular assay.  Negative results do not preclude   SARS-CoV-2 infection and   should not be used as the sole basis for patient management decisions.         Fact sheet for Healthcare Providers: Lance   Fact sheet for Patients: Lance           Methodology: Isothermal Nucleic Acid Amplification         Medications:      furosemide  60 mg IntraVENous Once    famotidine (PEPCID) injection  10 mg IntraVENous Daily    levothyroxine  25 mcg Oral Daily    lidocaine 1 % injection  5 mL IntraDERmal Once    sodium chloride flush  5-40 mL IntraVENous 2 times per day    midodrine  5 mg Per NG tube TID WC    sodium polystyrene  15 g Oral Once    [Held by provider] amiodarone  100 mg Oral Daily    rocuronium  0.6 mg/kg IntraVENous Once    albuterol  2.5 mg Nebulization Q6H    sodium chloride flush  5-40 mL IntraVENous 2 times per day    aspirin EC  81 mg Oral Daily    atorvastatin  40 mg Oral Daily    magnesium oxide  400 mg Oral BID    miconazole   Topical BID    ferrous sulfate  325 mg Oral Daily with breakfast           Infectious Disease [address] [place] [address] MD Loy  [employer] messaging  OFFICE: [phone]      Electronically signed by Hollis Diamond MD on 7/5/2022 at 3:46 PM  Thank you for allowing us to participate in the care of this patient. Please call with questions. This note iscreated with the assistance of a speech recognition program.  While intending to generate a document that actually reflects the content of the visit, the document can still have some errors including those of syntax andsound a like substitutions which may escape proof reading. In such instances, actual meaning can be extrapolated by contextual diversion.

## 2022-07-05 NOTE — PROGRESS NOTES
[facility])  Occupational Therapy Not Seen Note    DATE: 2022    NAME: Yane Lang  MRN: [id number]   : 1950      Patient not seen this date for Occupational Therapy due to:    Sedation: Precedex/Intubated    Next Scheduled Treatment: Ck     Electronically signed by SANDY Smith/ALEKSANDER on 2022 at 11:43 AM

## 2022-07-05 NOTE — PLAN OF CARE
Problem: Respiratory - Adult  Goal: Achieves optimal ventilation and oxygenation  7/5/2022 0758 by Jonh Reece RCP  Outcome: Progressing  7/4/2022 2010 by Maureen Miner RCP  Outcome: Progressing  Flowsheets (Taken 7/4/2022 2010)  Achieves optimal ventilation and oxygenation:   Assess for changes in respiratory status   Position to facilitate oxygenation and minimize respiratory effort   Respiratory therapy support as indicated   Assess the need for suctioning and aspirate as needed   Assess for changes in mentation and behavior   Oxygen supplementation based on oxygen saturation or arterial blood gases  7/4/2022 1944 by Ciara Samuel RN  Outcome: Progressing

## 2022-07-06 LAB
ABO/RH: NORMAL
ABSOLUTE EOS #: 0.09 K/UL (ref 0–0.44)
ABSOLUTE IMMATURE GRANULOCYTE: 0.28 K/UL (ref 0–0.3)
ABSOLUTE LYMPH #: 0.82 K/UL (ref 1.1–3.7)
ABSOLUTE MONO #: 1.47 K/UL (ref 0.1–1.2)
ALLEN TEST: POSITIVE
ANION GAP SERPL CALCULATED.3IONS-SCNC: 7 MMOL/L (ref 9–17)
ANTIBODY IDENTIFICATION: NORMAL
ANTIBODY SCREEN: POSITIVE
ARM BAND NUMBER: NORMAL
BASOPHILS # BLD: 1 % (ref 0–2)
BASOPHILS ABSOLUTE: 0.05 K/UL (ref 0–0.2)
BLD PROD TYP BPU: NORMAL
BPU ID: NORMAL
BUN BLDV-MCNC: 44 MG/DL (ref 8–23)
CALCIUM SERPL-MCNC: 8.1 MG/DL (ref 8.6–10.4)
CHLORIDE BLD-SCNC: 99 MMOL/L (ref 98–107)
CO2: 27 MMOL/L (ref 20–31)
CREAT SERPL-MCNC: 2.02 MG/DL (ref 0.5–0.9)
CROSSMATCH RESULT: NORMAL
DAT IGG: NEGATIVE
DISPENSE STATUS BLOOD BANK: NORMAL
EOSINOPHILS RELATIVE PERCENT: 1 % (ref 1–4)
EXPIRATION DATE: NORMAL
FIO2: 30
GFR AFRICAN AMERICAN: 29 ML/MIN
GFR NON-AFRICAN AMERICAN: 24 ML/MIN
GFR SERPL CREATININE-BSD FRML MDRD: ABNORMAL ML/MIN/{1.73_M2}
GLUCOSE BLD-MCNC: 163 MG/DL (ref 74–100)
GLUCOSE BLD-MCNC: 172 MG/DL (ref 70–99)
HCT VFR BLD CALC: 23.7 % (ref 36.3–47.1)
HCT VFR BLD CALC: 28.2 % (ref 36.3–47.1)
HEMOGLOBIN: 7 G/DL (ref 11.9–15.1)
HEMOGLOBIN: 8.8 G/DL (ref 11.9–15.1)
IMMATURE GRANULOCYTES: 4 %
LYMPHOCYTES # BLD: 10 % (ref 24–43)
MCH RBC QN AUTO: 30 PG (ref 25.2–33.5)
MCHC RBC AUTO-ENTMCNC: 29.5 G/DL (ref 28.4–34.8)
MCV RBC AUTO: 101.7 FL (ref 82.6–102.9)
MODE: ABNORMAL
MONOCYTES # BLD: 18 % (ref 3–12)
NRBC AUTOMATED: 0.2 PER 100 WBC
O2 DEVICE/FLOW/%: ABNORMAL
PDW BLD-RTO: 16.3 % (ref 11.8–14.4)
PLATELET # BLD: ABNORMAL K/UL (ref 138–453)
PLATELET, FLUORESCENCE: 116 K/UL (ref 138–453)
PLATELET, IMMATURE FRACTION: 2.7 % (ref 1.1–10.3)
POC HCO3: 28.5 MMOL/L (ref 21–28)
POC O2 SATURATION: 97 % (ref 94–98)
POC PCO2: 44.8 MM HG (ref 35–48)
POC PH: 7.41 (ref 7.35–7.45)
POC PO2: 92.6 MM HG (ref 83–108)
POSITIVE BASE EXCESS, ART: 4 (ref 0–3)
POTASSIUM SERPL-SCNC: 4.4 MMOL/L (ref 3.7–5.3)
RBC # BLD: 2.33 M/UL (ref 3.95–5.11)
RBC # BLD: ABNORMAL 10*6/UL
SAMPLE SITE: ABNORMAL
SEG NEUTROPHILS: 66 % (ref 36–65)
SEGMENTED NEUTROPHILS ABSOLUTE COUNT: 5.35 K/UL (ref 1.5–8.1)
SODIUM BLD-SCNC: 133 MMOL/L (ref 135–144)
TRANSFUSION STATUS: NORMAL
UNIT DIVISION: 0
WBC # BLD: 8.1 K/UL (ref 3.5–11.3)

## 2022-07-06 PROCEDURE — 99291 CRITICAL CARE FIRST HOUR: CPT | Performed by: INTERNAL MEDICINE

## 2022-07-06 PROCEDURE — 85014 HEMATOCRIT: CPT

## 2022-07-06 PROCEDURE — 6360000002 HC RX W HCPCS: Performed by: STUDENT IN AN ORGANIZED HEALTH CARE EDUCATION/TRAINING PROGRAM

## 2022-07-06 PROCEDURE — 85025 COMPLETE CBC W/AUTO DIFF WBC: CPT

## 2022-07-06 PROCEDURE — 6370000000 HC RX 637 (ALT 250 FOR IP): Performed by: INTERNAL MEDICINE

## 2022-07-06 PROCEDURE — 85055 RETICULATED PLATELET ASSAY: CPT

## 2022-07-06 PROCEDURE — 86870 RBC ANTIBODY IDENTIFICATION: CPT

## 2022-07-06 PROCEDURE — 6360000002 HC RX W HCPCS

## 2022-07-06 PROCEDURE — 2580000003 HC RX 258: Performed by: STUDENT IN AN ORGANIZED HEALTH CARE EDUCATION/TRAINING PROGRAM

## 2022-07-06 PROCEDURE — 6370000000 HC RX 637 (ALT 250 FOR IP): Performed by: STUDENT IN AN ORGANIZED HEALTH CARE EDUCATION/TRAINING PROGRAM

## 2022-07-06 PROCEDURE — 2700000000 HC OXYGEN THERAPY PER DAY

## 2022-07-06 PROCEDURE — 2500000003 HC RX 250 WO HCPCS: Performed by: STUDENT IN AN ORGANIZED HEALTH CARE EDUCATION/TRAINING PROGRAM

## 2022-07-06 PROCEDURE — 90935 HEMODIALYSIS ONE EVALUATION: CPT

## 2022-07-06 PROCEDURE — 86901 BLOOD TYPING SEROLOGIC RH(D): CPT

## 2022-07-06 PROCEDURE — 86880 COOMBS TEST DIRECT: CPT

## 2022-07-06 PROCEDURE — 86900 BLOOD TYPING SEROLOGIC ABO: CPT

## 2022-07-06 PROCEDURE — 6360000002 HC RX W HCPCS: Performed by: INTERNAL MEDICINE

## 2022-07-06 PROCEDURE — P9047 ALBUMIN (HUMAN), 25%, 50ML: HCPCS

## 2022-07-06 PROCEDURE — 85018 HEMOGLOBIN: CPT

## 2022-07-06 PROCEDURE — 82947 ASSAY GLUCOSE BLOOD QUANT: CPT

## 2022-07-06 PROCEDURE — P9040 RBC LEUKOREDUCED IRRADIATED: HCPCS

## 2022-07-06 PROCEDURE — 82803 BLOOD GASES ANY COMBINATION: CPT

## 2022-07-06 PROCEDURE — 36415 COLL VENOUS BLD VENIPUNCTURE: CPT

## 2022-07-06 PROCEDURE — 94640 AIRWAY INHALATION TREATMENT: CPT

## 2022-07-06 PROCEDURE — 99232 SBSQ HOSP IP/OBS MODERATE 35: CPT | Performed by: INTERNAL MEDICINE

## 2022-07-06 PROCEDURE — 94761 N-INVAS EAR/PLS OXIMETRY MLT: CPT

## 2022-07-06 PROCEDURE — 36600 WITHDRAWAL OF ARTERIAL BLOOD: CPT

## 2022-07-06 PROCEDURE — 80048 BASIC METABOLIC PNL TOTAL CA: CPT

## 2022-07-06 PROCEDURE — 86850 RBC ANTIBODY SCREEN: CPT

## 2022-07-06 PROCEDURE — 86920 COMPATIBILITY TEST SPIN: CPT

## 2022-07-06 PROCEDURE — 51798 US URINE CAPACITY MEASURE: CPT

## 2022-07-06 PROCEDURE — 2000000000 HC ICU R&B

## 2022-07-06 PROCEDURE — 90935 HEMODIALYSIS ONE EVALUATION: CPT | Performed by: INTERNAL MEDICINE

## 2022-07-06 RX ORDER — ALBUMIN (HUMAN) 12.5 G/50ML
SOLUTION INTRAVENOUS
Status: COMPLETED
Start: 2022-07-06 | End: 2022-07-06

## 2022-07-06 RX ORDER — DIPHENHYDRAMINE HYDROCHLORIDE 50 MG/ML
25 INJECTION INTRAMUSCULAR; INTRAVENOUS ONCE
Status: COMPLETED | OUTPATIENT
Start: 2022-07-06 | End: 2022-07-06

## 2022-07-06 RX ORDER — ALBUMIN (HUMAN) 12.5 G/50ML
25 SOLUTION INTRAVENOUS PRN
Status: DISCONTINUED | OUTPATIENT
Start: 2022-07-06 | End: 2022-07-18 | Stop reason: HOSPADM

## 2022-07-06 RX ORDER — SODIUM CHLORIDE 9 MG/ML
INJECTION, SOLUTION INTRAVENOUS CONTINUOUS
Status: DISCONTINUED | OUTPATIENT
Start: 2022-07-06 | End: 2022-07-07

## 2022-07-06 RX ORDER — SODIUM CHLORIDE 9 MG/ML
INJECTION, SOLUTION INTRAVENOUS PRN
Status: DISCONTINUED | OUTPATIENT
Start: 2022-07-06 | End: 2022-07-18 | Stop reason: HOSPADM

## 2022-07-06 RX ORDER — HALOPERIDOL 5 MG/ML
2 INJECTION INTRAMUSCULAR ONCE
Status: COMPLETED | OUTPATIENT
Start: 2022-07-06 | End: 2022-07-06

## 2022-07-06 RX ADMIN — EPOETIN ALFA-EPBX 5000 UNITS: 10000 INJECTION, SOLUTION INTRAVENOUS; SUBCUTANEOUS at 14:06

## 2022-07-06 RX ADMIN — ATORVASTATIN CALCIUM 40 MG: 80 TABLET, FILM COATED ORAL at 09:13

## 2022-07-06 RX ADMIN — FENTANYL CITRATE 25 MCG: 50 INJECTION, SOLUTION INTRAMUSCULAR; INTRAVENOUS at 15:05

## 2022-07-06 RX ADMIN — MAGNESIUM GLUCONATE 500 MG ORAL TABLET 400 MG: 500 TABLET ORAL at 09:17

## 2022-07-06 RX ADMIN — AMIODARONE HYDROCHLORIDE 100 MG: 200 TABLET ORAL at 10:13

## 2022-07-06 RX ADMIN — ALBUMIN (HUMAN) 25 G: 12.5 SOLUTION INTRAVENOUS at 13:15

## 2022-07-06 RX ADMIN — ALBUMIN (HUMAN) 25 G: 0.25 INJECTION, SOLUTION INTRAVENOUS at 13:15

## 2022-07-06 RX ADMIN — SODIUM CHLORIDE, PRESERVATIVE FREE 10 ML: 5 INJECTION INTRAVENOUS at 20:05

## 2022-07-06 RX ADMIN — ALBUTEROL SULFATE 2.5 MG: 2.5 SOLUTION RESPIRATORY (INHALATION) at 07:48

## 2022-07-06 RX ADMIN — ALBUTEROL SULFATE 2.5 MG: 2.5 SOLUTION RESPIRATORY (INHALATION) at 14:54

## 2022-07-06 RX ADMIN — HEPARIN SODIUM 1200 UNITS: 1000 INJECTION INTRAVENOUS; SUBCUTANEOUS at 14:30

## 2022-07-06 RX ADMIN — DOCUSATE SODIUM LIQUID 100 MG: 100 LIQUID ORAL at 10:14

## 2022-07-06 RX ADMIN — DEXMEDETOMIDINE HYDROCHLORIDE 1 MCG/KG/HR: 4 INJECTION, SOLUTION INTRAVENOUS at 15:40

## 2022-07-06 RX ADMIN — MIDODRINE HYDROCHLORIDE 5 MG: 5 TABLET ORAL at 13:45

## 2022-07-06 RX ADMIN — SODIUM CHLORIDE: 9 INJECTION, SOLUTION INTRAVENOUS at 04:47

## 2022-07-06 RX ADMIN — SODIUM CHLORIDE: 9 INJECTION, SOLUTION INTRAVENOUS at 19:03

## 2022-07-06 RX ADMIN — ALBUTEROL SULFATE 2.5 MG: 2.5 SOLUTION RESPIRATORY (INHALATION) at 02:16

## 2022-07-06 RX ADMIN — DIPHENHYDRAMINE HYDROCHLORIDE 25 MG: 50 INJECTION, SOLUTION INTRAMUSCULAR; INTRAVENOUS at 21:30

## 2022-07-06 RX ADMIN — LEVOTHYROXINE SODIUM 25 MCG: 25 TABLET ORAL at 06:12

## 2022-07-06 RX ADMIN — FENTANYL CITRATE 50 MCG: 50 INJECTION, SOLUTION INTRAMUSCULAR; INTRAVENOUS at 11:21

## 2022-07-06 RX ADMIN — DEXMEDETOMIDINE HYDROCHLORIDE 1.2 MCG/KG/HR: 4 INJECTION, SOLUTION INTRAVENOUS at 09:10

## 2022-07-06 RX ADMIN — FENTANYL CITRATE 50 MCG: 50 INJECTION, SOLUTION INTRAMUSCULAR; INTRAVENOUS at 02:26

## 2022-07-06 RX ADMIN — ANTI-FUNGAL POWDER MICONAZOLE NITRATE TALC FREE: 1.42 POWDER TOPICAL at 20:05

## 2022-07-06 RX ADMIN — ALBUTEROL SULFATE 2.5 MG: 2.5 SOLUTION RESPIRATORY (INHALATION) at 20:47

## 2022-07-06 RX ADMIN — ANTI-FUNGAL POWDER MICONAZOLE NITRATE TALC FREE: 1.42 POWDER TOPICAL at 09:17

## 2022-07-06 RX ADMIN — MORPHINE SULFATE 2 MG: 2 INJECTION, SOLUTION INTRAMUSCULAR; INTRAVENOUS at 12:14

## 2022-07-06 RX ADMIN — FENTANYL CITRATE 50 MCG: 50 INJECTION, SOLUTION INTRAMUSCULAR; INTRAVENOUS at 09:11

## 2022-07-06 RX ADMIN — Medication 81 MG: at 09:12

## 2022-07-06 RX ADMIN — HALOPERIDOL LACTATE 2 MG: 5 INJECTION, SOLUTION INTRAMUSCULAR at 23:24

## 2022-07-06 RX ADMIN — MORPHINE SULFATE 2 MG: 2 INJECTION, SOLUTION INTRAMUSCULAR; INTRAVENOUS at 00:38

## 2022-07-06 RX ADMIN — DEXMEDETOMIDINE HYDROCHLORIDE 1.2 MCG/KG/HR: 4 INJECTION, SOLUTION INTRAVENOUS at 01:50

## 2022-07-06 RX ADMIN — MIDODRINE HYDROCHLORIDE 5 MG: 5 TABLET ORAL at 09:16

## 2022-07-06 RX ADMIN — SODIUM CHLORIDE, PRESERVATIVE FREE 10 MG: 5 INJECTION INTRAVENOUS at 09:15

## 2022-07-06 RX ADMIN — FENTANYL CITRATE 50 MCG: 50 INJECTION, SOLUTION INTRAMUSCULAR; INTRAVENOUS at 06:12

## 2022-07-06 RX ADMIN — HEPARIN SODIUM 1300 UNITS: 1000 INJECTION INTRAVENOUS; SUBCUTANEOUS at 14:30

## 2022-07-06 ASSESSMENT — PULMONARY FUNCTION TESTS
PIF_VALUE: 14
PIF_VALUE: 17
PIF_VALUE: 17

## 2022-07-06 NOTE — PLAN OF CARE
Problem: Discharge Planning  Goal: Discharge to home or other facility with appropriate resources  7/6/2022 0852 by Skylar Manriquez RN  Outcome: Progressing  7/6/2022 0140 by Davion Crocker RN  Outcome: Progressing

## 2022-07-06 NOTE — CARE COORDINATION
SW following for OP HD referral.   Referrals to [facility] [address] [place] [address] and [facility]. Patient remains intubated/sedated. May need LTAC, [facility]. KARLY provided update to Scarlet at [facility].

## 2022-07-06 NOTE — PLAN OF CARE
Problem: Discharge Planning  Goal: Discharge to home or other facility with appropriate resources  Outcome: Progressing     Problem: Chronic Conditions and Co-morbidities  Goal: Patient's chronic conditions and co-morbidity symptoms are monitored and maintained or improved  Outcome: Progressing     Problem: Pain  Goal: Verbalizes/displays adequate comfort level or baseline comfort level  Outcome: Progressing     Problem: Skin/Tissue Integrity  Goal: Absence of new skin breakdown  Description: 1. Monitor for areas of redness and/or skin breakdown  2. Assess vascular access sites hourly  3. Every 4-6 hours minimum:  Change oxygen saturation probe site  4. Every 4-6 hours:  If on nasal continuous positive airway pressure, respiratory therapy assess nares and determine need for appliance change or resting period. Outcome: Progressing     Problem: Safety - Adult  Goal: Free from fall injury  Outcome: Progressing     Problem: ABCDS Injury Assessment  Goal: Absence of physical injury  Outcome: Progressing     Problem: Respiratory - Adult  Goal: Achieves optimal ventilation and oxygenation  7/6/2022 0140 by Renee Cosme RN  Outcome: Progressing  7/5/2022 2013 by [address] [place] [address] Drive KAREN Miner  Outcome: Progressing  Flowsheets (Taken 7/4/2022 2010)  Achieves optimal ventilation and oxygenation:   Assess for changes in respiratory status   Position to facilitate oxygenation and minimize respiratory effort   Respiratory therapy support as indicated   Assess the need for suctioning and aspirate as needed   Assess for changes in mentation and behavior   Oxygen supplementation based on oxygen saturation or arterial blood gases     Problem: Safety - Medical Restraint  Goal: Remains free of injury from restraints (Restraint for Interference with Medical Device)  Description: INTERVENTIONS:  1. Determine that other, less restrictive measures have been tried or would not be effective before applying the restraint  2.  Evaluate the patient's condition at the time of restraint application  3. Inform patient/family regarding the reason for restraint  4.  Q2H: Monitor safety, psychosocial status, comfort, nutrition and hydration  Outcome: Progressing  Flowsheets  Taken 7/5/2022 1600 by Sheryl Solis RN  Remains free of injury from restraints (restraint for interference with medical device): Every 2 hours: Monitor safety, psychosocial status, comfort, nutrition and hydration  Taken 7/5/2022 1400 by Sheryl Solis RN  Remains free of injury from restraints (restraint for interference with medical device): Every 2 hours: Monitor safety, psychosocial status, comfort, nutrition and hydration  Taken 7/5/2022 1200 by Sheryl Solis RN  Remains free of injury from restraints (restraint for interference with medical device): Every 2 hours: Monitor safety, psychosocial status, comfort, nutrition and hydration

## 2022-07-06 NOTE — PROGRESS NOTES
Physical Therapy        Physical Therapy Cancel Note      DATE: 2022    NAME: La Nena Murrell  MRN: [id number]   : 1950      Patient not seen this date for Physical Therapy due to:    Patient is not appropriate for PT evaluation/treatment at this time d/t int/sed.       Electronically signed by Nanine Sandifer, PT on 2022 at 10:32 AM

## 2022-07-06 NOTE — PLAN OF CARE
Problem: Respiratory - Adult  Goal: Achieves optimal ventilation and oxygenation  7/6/2022 0752 by Jonh Reece RCP  Outcome: Progressing  7/6/2022 0140 by Shanna Thomas RN  Outcome: Progressing  7/5/2022 2013 by Maureen Miner RCP  Outcome: Progressing  Flowsheets (Taken 7/4/2022 2010)  Achieves optimal ventilation and oxygenation:   Assess for changes in respiratory status   Position to facilitate oxygenation and minimize respiratory effort   Respiratory therapy support as indicated   Assess the need for suctioning and aspirate as needed   Assess for changes in mentation and behavior   Oxygen supplementation based on oxygen saturation or arterial blood gases

## 2022-07-06 NOTE — PROGRESS NOTES
(cough)  fluticasone-salmeterol (ADVAIR HFA) 115-21 MCG/ACT inhaler, Inhale 2 puffs into the lungs 2 times daily Maintenance inhaler  amLODIPine (NORVASC) 10 MG tablet, Take 1 tablet by mouth nightly  Blood Pressure KIT, Diagnosis: HTN. Needs to check blood pressure 1-2 times a day until stable, then once a day. Goal blood pressure less than 135/85, and above 110/60. vitamin D (ERGOCALCIFEROL) 1.25 MG (46919 UT) CAPS capsule, Take 1 capsule by mouth once a week Sundays  amiodarone (CORDARONE) 200 MG tablet, Take 1 tablet by mouth daily  Nebulizers (COMPRESSOR/NEBULIZER) MISC, Nebulizer with compressor. Disposable Med Nebs 2 /month. Reusable Med Nebs 1 per 6 months. Aerosol Mask 1 per month. Replacement Filters 2 per month. All other related supplies as needed per month. Medications being used: Albuterol . 083 unit dose vial. Frequency: every 6 hrs x 4/day . Length of Need: 1 (Patient not taking: Reported on 6/17/2022)  albuterol (PROVENTIL) (2.5 MG/3ML) 0.083% nebulizer solution, Take 3 mLs by nebulization every 6 hours as needed for Wheezing or Shortness of Breath (Patient not taking: Reported on 6/17/2022)  Misc. Devices (ADJUST FOLD CANE/YORK HANDLE) MISC, Use daily for walking  Handicap Placard MISC, by Does not apply route Expires on 12/30/25  atorvastatin (LIPITOR) 40 MG tablet, Take 1 tablet by mouth once daily  desloratadine (CLARINEX) 5 MG tablet, Take 1 tablet by mouth once daily  FreeStyle Lancets MISC, 1 each by Does not apply route daily Patient needs to contact office before any further refills will be approved  magnesium oxide (MAG-OX) 400 (241.3 Mg) MG TABS tablet, Take 1 tablet by mouth twice daily  miconazole (MICOTIN) 2 % powder, Apply topically 2 times daily UNDER THE SKIN FOLDS LONG TERM  Multiple Vitamins-Minerals (THERAPEUTIC MULTIVITAMIN-MINERALS) tablet, Take 1 tablet by mouth daily  blood glucose test strips (FREESTYLE LITE) strip, 1 each by In Vitro route daily As needed.   Blood Pressure KIT, 0453 151 lb 0.2 oz (68.5 kg)       Vital Signs:   Temperature:  Temp: 98.8 °F (37.1 °C)  TMax:   Temp (24hrs), Av.7 °F (36.5 °C), Min:97 °F (36.1 °C), Max:98.8 °F (37.1 °C)    Respirations:  Resp: 17  Pulse:   Heart Rate: 70  BP:    BP: (!) 117/43  BP Range: Systolic (51GWG), BUZ:179 , Min:102 , OXI:741       Diastolic (53PLX), YUX:41, Min:40, Max:115      Physical Examination:     General:  On mechanical ventilation, sedated. HEENT: Atraumatic, normocephalic, no throat congestion, moist mucosa. Eyes:   Pupils equal, round and reactive to light. Neck:   Supple  Chest:   Bilateral vesicular breath sounds, no rales or wheezes. Cardiac:  S1 S2 RR, no murmurs, gallops or rubs. Abdomen: Soft, no masses or organomegaly, BS audible. :   No suprapubic or flank tenderness. Neuro: On mechanical ventilation, sedated. SKIN:  No rashes, good skin turgor. Extremities:  +ve dependent and trace LE edema. Labs:       Recent Labs     22  0006 22  1815 22  0354 22  0354 22  1057 22  1837 22  0456   WBC 7.1  --  7.8  --   --   --  8.1   RBC 2.48*  --  2.37*  --   --   --  2.33*   HGB 7.5*   < > 7.0*   < > 7.0* 7.3* 7.0*   HCT 25.0*   < > 22.9*   < > 23.3* 23.9* 23.7*   .8  --  96.6  --   --   --  101.7   MCH 30.2  --  29.5  --   --   --  30.0   MCHC 30.0  --  30.6  --   --   --  29.5   RDW 16.4*  --  16.4*  --   --   --  16.3*   *  --  See Reflexed IPF Result  --   --   --  See Reflexed IPF Result   MPV 10.2  --   --   --   --   --   --     < > = values in this interval not displayed.       BMP:   Recent Labs     22  0006 22  0354 22  0456   * 133* 133*   K 3.8 4.2 4.4   CL 97* 99 99   CO2 26 29 27   BUN 51* 29* 44*   CREATININE 2.10* 1.49* 2.02*   GLUCOSE 169* 146* 172*   CALCIUM 7.9* 7.6* 8.1*     YRIS:      Lab Results   Component Value Date/Time    YRIS NEGATIVE 10/27/2020 04:37 PM     SPEP:  Lab Results   Component Value Date/Time PROT 5.3 06/27/2022 02:59 PM    ALBCAL 2.5 06/01/2022 03:00 PM    ALBPCT 48 06/01/2022 03:00 PM    LABALPH 0.3 06/01/2022 03:00 PM    LABALPH 1.2 06/01/2022 03:00 PM    A1PCT 6 06/01/2022 03:00 PM    A2PCT 23 06/01/2022 03:00 PM    LABBETA 0.7 06/01/2022 03:00 PM    BETAPCT 13 06/01/2022 03:00 PM    GAMGLOB 0.5 06/01/2022 03:00 PM    GGPCT 10 06/01/2022 03:00 PM    PATH ELECTRONICALLY SIGNED. Holland Claude, M.D. 06/01/2022 03:00 PM    PATH ELECTRONICALLY SIGNED. Holland Claude, M.D. 06/01/2022 03:00 PM     UPEP:     Lab Results   Component Value Date/Time    LABPE  10/27/2020 04:35 PM     ELEVATED PROTEIN CONCENTRATION. MOST SERUM PROTEINS ARE DETECTED IN THIS URINE.   USUALLY OBSERVED WITH MARKEDLY INCREASED NON-SELECTIVE GLOMERULAR PERMEABILITY (i.e. SEVERE GLOMERULAR DISEASE), CONTAMINATION OF     C3:     Lab Results   Component Value Date/Time    C3 147 10/27/2020 04:37 PM     C4:     Lab Results   Component Value Date/Time    C4 29 10/27/2020 04:37 PM     MPO ANCA:     Lab Results   Component Value Date/Time    MPO 5 10/27/2020 04:37 PM     PR3 ANCA:     Lab Results   Component Value Date/Time    PR3 16 10/27/2020 04:37 PM     Hep BsAg:         Lab Results   Component Value Date/Time    HEPBSAG NONREACTIVE 06/28/2022 02:56 PM     Hep C AB:          Lab Results   Component Value Date/Time    HEPCAB NONREACTIVE 06/28/2022 02:56 PM     Urinalysis/Chemistries:      Lab Results   Component Value Date/Time    NITRU NEGATIVE 06/27/2022 06:35 PM    COLORU Dark Yellow 06/27/2022 06:35 PM    PHUR 5.5 06/27/2022 06:35 PM    WBCUA 20 TO 50 06/27/2022 06:35 PM    RBCUA 20 TO 50 06/27/2022 06:35 PM    MUCUS 1+ 06/27/2022 06:35 PM    TRICHOMONAS NOT REPORTED 02/07/2022 02:42 AM    YEAST NOT REPORTED 02/07/2022 02:42 AM    BACTERIA FEW 06/12/2022 09:33 PM    SPECGRAV 1.030 06/27/2022 06:35 PM    LEUKOCYTESUR SMALL 06/27/2022 06:35 PM    UROBILINOGEN Normal 06/27/2022 06:35 PM    BILIRUBINUR NEGATIVE 06/27/2022 06:35 PM GLUCOSEU 1+ 06/27/2022 06:35 PM    KETUA TRACE 06/27/2022 06:35 PM    AMORPHOUS NOT REPORTED 02/07/2022 02:42 AM     Urine Sodium:     Lab Results   Component Value Date/Time    SLOAN <20 06/08/2022 09:52 PM      Urine Creatinine:     Lab Results   Component Value Date/Time    LABCREA 63.0 06/08/2022 09:52 PM     Radiology:     Reviewed. Assessment:     1. JOY currently HD dependent, likely secondary to ATN from hypotension requiring pressor support vs progression from underlying diabetic nephropathy, getting dialysis as per [facility] schedule. Hemodialysis initiated on 6/28/2022. 2. Hyperkalemia likely from progressive decline in renal function, now improved with dialysis. 3. Nephrotic range proteinuria likely due to diabetic nephropathy. 4. Diabetes type 2.  5. Currently on mechanical ventilation. 6. Nonhealing lower extremity ulcers. 7. Hypotension did require pressor support this admission. 8. Fluid overload  9. Drop in hemoglobin. Plan:   1. Patient was seen and examined on HD at bedside. Orders were confirmed with the HD nurse. 2. Will likely require tunneled catheter placement if continues to require HD. 3. Blood transfusion 1 unit ordered for dialysis. 4. Monitor strict I/Os and renal function for signs of improvement. 5. Bladder scan every shift and St. Cath if >250 cc.   6. BMP in AM.  7.  working towards setting up outpatient dialysis spot. 8. Will follow. Nutrition   Please ensure that patient is on a renal diet/TF. Avoid nephrotoxic drugs/contrast exposure. Vamsi Newman MD  [facility]     This note is created with the assistance of a speech-recognition program. While intending to generate a document that actually reflects the content of the visit, no guarantees can be provided that every mistake has been identified and corrected by editing.

## 2022-07-06 NOTE — PLAN OF CARE
Problem: Respiratory - Adult  Goal: Achieves optimal ventilation and oxygenation  7/5/2022 2013 by Vijaya Miner RCP  Outcome: Progressing  Flowsheets (Taken 7/4/2022 2010)  Achieves optimal ventilation and oxygenation:   Assess for changes in respiratory status   Position to facilitate oxygenation and minimize respiratory effort   Respiratory therapy support as indicated   Assess the need for suctioning and aspirate as needed   Assess for changes in mentation and behavior   Oxygen supplementation based on oxygen saturation or arterial blood gases   BRONCHOSPASM/BRONCHOCONSTRICTION     [x]         IMPROVE AERATION/BREATH SOUNDS  [x]   ADMINISTER BRONCHODILATOR THERAPY AS APPROPRIATE  [x]   ASSESS BREATH SOUNDS  []   IMPLEMENT AEROSOL/MDI PROTOCOL  [x]   PATIENT EDUCATION AS NEEDED

## 2022-07-06 NOTE — PROGRESS NOTES
Dialysis Time Out  To be done by RN and tech or 2 RNs  Staff Names Martina HICKMAN RN and Henri Dumont RN    [x]  Identity of the patient using 2 patient identifiers  [x]  Consent for treatment  [x]  Equipment-proper machine and dialyzer  [x]  B-Hep B status  [x]  Orders- to include bath, blood flow, dialyzer, time and fluid removal  [x]  Access-Correct site and in working order  [x]  Time for patient to ask questions.

## 2022-07-06 NOTE — PROGRESS NOTES
Order obtained for extubation. SpO2 of 100 on 30% FiO2. Patient extubated and placed on 4 liters/min via nasal cannula. Post extubation SpO2 is 98% with HR  87 bpm and RR 20 breaths/min. Patient had moderate cough that was non-productive. Extubation Well tolerated by patient. .   Breath Sounds: clear      Jerod Freeze, RCP   6:13 PM

## 2022-07-06 NOTE — PLAN OF CARE
Problem: Discharge Planning  Goal: Discharge to home or other facility with appropriate resources  7/6/2022 1932 by Kristine Sandhu RN  Outcome: Progressing  7/6/2022 0852 by Skylar Manriquez RN  Outcome: Progressing     Problem: Chronic Conditions and Co-morbidities  Goal: Patient's chronic conditions and co-morbidity symptoms are monitored and maintained or improved  7/6/2022 1932 by Kristine Sandhu RN  Outcome: Progressing  7/6/2022 0852 by Skylar Manriquez RN  Outcome: Progressing     Problem: Pain  Goal: Verbalizes/displays adequate comfort level or baseline comfort level  7/6/2022 1932 by Kristine Sandhu RN  Outcome: Progressing  7/6/2022 0852 by Skylar Manriquez RN  Outcome: Progressing     Problem: Skin/Tissue Integrity  Goal: Absence of new skin breakdown  Description: 1. Monitor for areas of redness and/or skin breakdown  2. Assess vascular access sites hourly  3. Every 4-6 hours minimum:  Change oxygen saturation probe site  4. Every 4-6 hours:  If on nasal continuous positive airway pressure, respiratory therapy assess nares and determine need for appliance change or resting period.   7/6/2022 1932 by Kristine Sandhu RN  Outcome: Progressing  7/6/2022 0852 by Skylar Manriquez RN  Outcome: Progressing     Problem: Safety - Adult  Goal: Free from fall injury  7/6/2022 1932 by Kristine Sandhu RN  Outcome: Progressing  Flowsheets (Taken 7/6/2022 1931)  Free From Fall Injury: Instruct family/caregiver on patient safety  7/6/2022 0852 by Skylar Manriquez RN  Outcome: Progressing     Problem: ABCDS Injury Assessment  Goal: Absence of physical injury  7/6/2022 1932 by Kristine Sandhu RN  Outcome: Progressing  Flowsheets  Taken 7/6/2022 1931 by Kristine Sandhu RN  Absence of Physical Injury: Implement safety measures based on patient assessment  Taken 7/6/2022 0859 by Skylar Manriquez RN  Absence of Physical Injury: Implement safety measures based on patient assessment  7/6/2022 0852 by Hanny Mccarthy

## 2022-07-06 NOTE — PROGRESS NOTES
[facility]  Progress Note    Mariajose Vasquez  MRN: [id number]  Date: 7/6/2022  LOS: 9     Reason for F/U :   Concern for septic shock    Impression :   1. Acute respiratory insufficiency-ventilator dependent intubated 6/28/2022  2. Severe bradycardia-resolved  · requiring dopamine and Levophed for blood pressure support  · Able to wean off the dopamine 6/29/22  3. Pulmonary edema  4. Acute kidney injury on chronic kidney disease  · Started on hemodialysis 6/28/2022  5. Leukocytosis-unclear etiology at this time  6. Shock-on Levophed  7. Multiple/bilateral lower extremity ulcerations-superficial not acutely infected  8. Coccygeal ulceration/right gluteal ulceration-clean    Recommendations:   · The patient received:  · 1 dose of levofloxacin on 6/27/2022   · Cefepime 6/28 through 7/2/2022  · The culture data has remained negative with no evidence of an acute infectious process. · The patient has been off antimicrobial therapy and remained stable. · The plan is for spontaneous breathing trials today and attempt to wean. Infection Control Recommendations:   Universal precautions    Discharge Planning:   Estimated Length of IV antimicrobials: 7/2/22  Patient will need Midline Catheter Insertion/ PICC line Insertion: No  Patient will need: Home IV , [place],  SNF,  LTAC: Undetermined  Patient willneed outpatient wound care: No    Medical Decision making / Summary of Stay:   Mariajose Vasquez is a 66y.o.-year-old female who was initially admitted on 6/27/2022.    Willow Mccormick has a history of diabetes mellitus type 2 with associated chronic kidney disease stage IV, hyperlipidemia, coronary artery disease, hypertension, asthma, previous CVA, acute on chronic combined systolic and diastolic heart failure, chronic left lower extremity wound for which she follows in the wound care center, meningioma     The patient was short of breath at home O2 sats in the low 90% on oxygen.     The patient was reported to be more fatigued than normal and the heart rate was found to be 35 and the patient was started on transcutaneous monitoring and atropine was given by EMS. The patient had a recent 48-hour Holter monitor showing sinus rhythm with some PACs earlier this month. The patient has required epinephrine for blood pressure support as this was switched to dopamine and Levophed which she continues currently. She is on BiPAP and currently nonconversant. She has developed acute kidney injury and will be started on hemodialysis. The chest x-ray shows findings consistent with pulmonary edema/congestion and the patient has been lethargic since admission. I was asked to evaluate for leukocytosis and bilateral lower extremity leg ulcers. The patient    2022  She did have a spontaneous breathing trial attempt earlier today and the respiratory therapist does report that she got quite agitated and tachypneic and did not last even an hour today. She is getting some fentanyl      Current evaluation:2022    BP (!) 102/90   Pulse 74   Temp 98.1 °F (36.7 °C) (Axillary)   Resp 17   Ht 4' 11\" (1.499 m)   Wt 153 lb 3.5 oz (69.5 kg)   SpO2 99%   BMI 30.95 kg/m²     Temperature Range: Temp: 98.1 °F (36.7 °C) Temp  Av.6 °F (36.4 °C)  Min: 97 °F (36.1 °C)  Max: 98.2 °F (36.8 °C)  The patient remains on the ventilator at 30% FiO2 5 of PEEP. The patient is on Precedex for sedation. She has had some episodes of agitation and this has been the rate limiting step in trying to get her extubated. Review of Systems   Unable to perform ROS: Intubated       Physical Examination :     Physical Exam  Constitutional:       Appearance: She is well-developed. Interventions: She is sedated and intubated. HENT:      Head: Normocephalic and atraumatic. Cardiovascular:      Rate and Rhythm: Regular rhythm. Heart sounds: Murmur heard. Pulmonary:      Effort: Pulmonary effort is normal. She is intubated. Breath sounds: Normal breath sounds. Abdominal:      General: Bowel sounds are normal.      Palpations: Abdomen is soft. Musculoskeletal:         General: Swelling (Bilateral upper extremities) present. Skin:     General: Skin is warm and dry. Comments: The legs and feet are currently in wrapped in Unna boots   Neurological:      Mental Status: She is oriented to person, place, and time. Laboratory data:   I have independently reviewed the followinglabs:  CBC with Differential:   Recent Labs     07/05/22  0354 07/05/22  1057 07/05/22  1837 07/06/22  0456   WBC 7.8  --   --  8.1   HGB 7.0*   < > 7.3* 7.0*   HCT 22.9*   < > 23.9* 23.7*   PLT See Reflexed IPF Result  --   --  See Reflexed IPF Result   LYMPHOPCT 12*  --   --  10*   MONOPCT 17*  --   --  18*    < > = values in this interval not displayed. BMP:   Recent Labs     07/05/22  0354 07/06/22  0456   * 133*   K 4.2 4.4   CL 99 99   CO2 29 27   BUN 29* 44*   CREATININE 1.49* 2.02*     Hepatic Function Panel:   No results for input(s): PROT, LABALBU, BILIDIR, IBILI, BILITOT, ALKPHOS, ALT, AST in the last 72 hours.       Lab Results   Component Value Date/Time    PROCAL 0.11 06/27/2022 12:44 PM    PROCAL 0.26 03/07/2022 05:53 AM     Lab Results   Component Value Date/Time    CRP <3.0 03/01/2022 10:07 PM    CRP <3.0 02/07/2022 06:23 AM     Lab Results   Component Value Date    SEDRATE 7 10/17/2017         Lab Results   Component Value Date/Time    DDIMER 0.71 07/25/2020 11:20 AM     Lab Results   Component Value Date/Time    FERRITIN 13 03/01/2022 10:07 PM    FERRITIN 155 07/25/2020 11:20 AM     Lab Results   Component Value Date/Time     03/10/2022 11:50 AM     07/25/2020 11:20 AM     Lab Results   Component Value Date/Time    FIBRINOGEN 610 07/25/2020 11:20 AM       Results in Past 30 Days  Result Component Current Result Ref Range Previous Result Ref Range   SARS-CoV-2, Rapid Not Detected (6/27/2022) Not Detected Not Detected (6/12/2022) Not Detected     Lab Results   Component Value Date/Time    COVID19 Not Detected 06/27/2022 01:44 PM    COVID19 Not Detected 06/12/2022 06:34 PM    COVID19 Not Detected 03/01/2022 10:34 PM    COVID19 Not Detected 02/06/2022 09:46 AM    COVID19 Not Detected 07/25/2020 12:17 PM       No results for input(s): VANCOTROUGH in the last 72 hours. Imaging Studies:   ONE XRAY VIEW OF THE CHEST 7/3/2022 8:03 am  Impression   Mild increased perihilar edema, mild increased perihilar opacification,   likely edema and CHF.  Persistent small effusions and bibasilar atelectasis.       Satisfactory position of endotracheal tube.           Cultures:     Culture, Blood 1 [[id number]] Collected: 06/27/22 1311   Order Status: Completed Specimen: Blood Updated: 07/02/22 1331    Specimen Description . BLOOD    Special Requests NOT GIVEN    Culture NO GROWTH 5 DAYS   Culture, Blood 1 [[id number]] Collected: 06/27/22 1245   Order Status: Completed Specimen: Blood Updated: 07/02/22 1327    Specimen Description . BLOOD    Special Requests L AC 1ML    Culture NO GROWTH 5 DAYS       Culture, Urine [[id number]] Collected: 06/27/22 2007   Order Status: Completed Specimen: Urine Updated: 06/28/22 2106    Specimen Description . URINE    Culture NO GROWTH   MRSA DNA Probe, Nasal [[id number]] Collected: 06/27/22 1742   Order Status: Completed Specimen: Nasal Updated: 06/28/22 1041    Specimen Description . NASAL SWAB    MRSA, DNA, Nasal NEGATIVE    Comment: NEGATIVE:  MRSA DNA not detected by nucleic acid amplification.                                                     Results should be used as an adjunct to nosocomial control efforts to identify patients   needing enhanced precautions.     The test is not intended to identify patients with staphylococcal infections.  Results   should not be used to guide or monitor treatment for MRSA infections.        MRSA DNA Probe, Nasal [[id number]]    Order Status: Canceled Specimen: Nares ferrous sulfate  325 mg Oral Daily with breakfast           [facility]  Kenia Deutsch MD  [employer] messaging  OFFICE: [phone]      Electronically signed by Kenia Deutsch MD on 7/6/2022 at 8:56 AM  Thank you for allowing us to participate in the care of this patient. Please call with questions. This note iscreated with the assistance of a speech recognition program.  While intending to generate a document that actually reflects the content of the visit, the document can still have some errors including those of syntax andsound a like substitutions which may escape proof reading. In such instances, actual meaning can be extrapolated by contextual diversion.

## 2022-07-06 NOTE — PROGRESS NOTES
Critical Care Team - Daily Progress Note      Date and time: 7/6/2022 8:41 AM  Patient's name:  Marian Li  Medical Record Number: [id number]  Patient's account/billing number: [de-identified]  Patient's YOB: 1950  Age: 66 y.o. Date of Admission: 6/27/2022 12:28 PM  Length of stay during current admission: 9      Primary Care Physician: Re Brennan MD  ICU Attending Physician: Dr. Gerardo Pfeiffer Status: Full Code    Reason for ICU admission:   Chief Complaint   Patient presents with    Bradycardia       Subjective:     OVERNIGHT EVENTS:  No significant events overnight    Today:  · Overnight no acute event . No  signs of bleed , hemoglobin still at 7   · Hemodynamically stable, afebrile overnight ,off pressors (was on norepi and dopamine at one point)  · -tolerating tube feeds. · Continue to remain intubated and sedated with precedex 1. 2  · -was weaning in the morning . Will extubate   · Likely dialysis today,would be 4th session if she gets it ,new dialysis with progression of diabetic nephropathy   · Blood cultures and urine cultures are negative- cefepime monitored off   · na 136>134>134>130>133  · wbc 6.8>7.5>7.1>7.8>8.1  · hgb stable 8.1>8.2>8.3>7.8>8.1 >7.5>7>7  · heparin for PAF. on hold as of now because of low hb . · nephro on board       Plan :  · SBT ongoing. Will extubate   · h and h every 12 hour  Because patient not actively bleeding . Will restart hepatin once hb more stable . · Restart [id number](was stopped because of roxy). Take 200 at home . Has metoprolol . Will resume depending upon bp and HR          Patient was found to have a heart rate as low as 35 and was started on transcutaneous pacing and route and was given a total of 2 mg atropine. Patient was most recently discharged from [facility] earlier this month for similar episodes of bradycardia.   Cardiology evaluated during that admission and attributed the bradycardia to medication and adjusted her metoprolol. Additionally she was discharged with a Holter monitor with results suggestive of normal sinus with some PACs. Patient is on Eliquis at home for atrial fibrillation and most recent echo was done in 2022 with an EF of 55%. At home on elaquis , metoprolol,amio 200, amlodipine ,hydralazine   AWAKE & FOLLOWING COMMANDS:  [x] No (, opens eyes to verbal commands) [] Yes    CURRENT VENTILATION STATUS:     [x] Ventilator  [] BIPAP  [] Nasal Cannula [] Room Air          SECRETIONS Amount:  [] Small [] Moderate  [] Large  [x] None  Color:     [] White [] Colored  [] Bloody    SEDATION:  RAAS Score:  [] Propofol gtt  [] Versed gtt  [] Ativan gtt   [] No Sedation PRECEDEX    PARALYZED:  [x] No    [] Yes    DIARRHEA:                [x] No                [] Yes  (C. Difficile status: [] positive                                                                                                                       [] negative                                                                                                                     [] pending)    VASOPRESSORS:  [x] No    [] Yes    If yes -    [] Levophed       [] Dopamine     [] Vasopressin       [] Dobutamine  [] Phenylephrine         [] Epinephrine    CENTRAL LINES:     [] No   [x] Yes   (Date of Insertion:   )           If yes -     [x] Right IJ     [] Left IJ [] Right Femoral [] Left Femoral                   [] Right Subclavian [] Left Subclavian       GUTIERREZ'S CATHETER:   [] No   [x] Yes  (Date of Insertion:   )     URINE OUTPUT:            [] Good   [] Low              [x] Anuric    REVIEW OF SYSTEMS:  Unable to obtain due to patient mentation.      OBJECTIVE:     VITAL SIGNS:  BP (!) 118/47   Pulse 91   Temp 98.2 °F (36.8 °C) (Oral)   Resp 23   Ht 4' 11\" (1.499 m)   Wt 153 lb 3.5 oz (69.5 kg)   SpO2 99%   BMI 30.95 kg/m²   Tmax over 24 hours:  Temp (24hrs), Av.5 °F (36.4 °C), Min:97 °F (36.1 °C), Max:98.2 °F (36.8 °C)      Patient Vitals for the past 8 hrs:   BP Temp Temp src Pulse Resp SpO2 Weight   07/06/22 0748 -- -- -- 91 23 99 % --   07/06/22 0700 (!) 118/47 -- -- 62 20 98 % --   07/06/22 0600 (!) 119/46 98.2 °F (36.8 °C) Oral 63 18 98 % 153 lb 3.5 oz (69.5 kg)   07/06/22 0400 (!) 114/43 97.9 °F (36.6 °C) Oral 67 19 -- --   07/06/22 0322 -- -- -- 63 20 98 % --   07/06/22 0300 (!) 114/46 -- -- 64 20 98 % --   07/06/22 0256 -- -- -- -- 20 -- --   07/06/22 0226 -- -- -- -- 20 -- --   07/06/22 0216 -- -- -- 72 24 97 % --   07/06/22 0200 (!) 130/50 97.5 °F (36.4 °C) Esophageal 68 19 100 % --   07/06/22 0108 -- -- -- -- 22 -- --   07/06/22 0100 (!) 123/44 -- -- 64 19 100 % --         Intake/Output Summary (Last 24 hours) at 7/6/2022 0841  Last data filed at 7/6/2022 1966  Gross per 24 hour   Intake 1249.83 ml   Output 250 ml   Net 999.83 ml     Date 07/06/22 0000 - 07/06/22 2359   Shift 7676-9016 4438-9240 4702-2660 24 Hour Total   INTAKE   I.V.(mL/kg) 206. 1(3)   206. 1(3)   NG/GT(mL/kg) 412(5.9)   412(5.9)   Shift Total(mL/kg) 618.1(8.9)   618.1(8.9)   OUTPUT   Shift Total(mL/kg)       Weight (kg) 69.5 69.5 69.5 69.5     Wt Readings from Last 3 Encounters:   07/06/22 153 lb 3.5 oz (69.5 kg)   06/22/22 152 lb (68.9 kg)   06/21/22 152 lb (68.9 kg)     Body mass index is 30.95 kg/m². PHYSICAL EXAM:  Constitutional: Intubated, sedated, opens eyes to verbal commands  HEENT: PERRLA, EOMI, sclera clear, anicteric  Respiratory: Bilateral mechanical breath sounds  Cardiovascular: regular rate and rhythm, normal S1, S2, no murmur apprecuated on physical exam  Abdomen: soft, nontender, nondistended, no masses or organomegaly  NEUROLOGIC: following command   Cranial nerves II-XII are grossly intact. Extremities:  peripheral pulses normal, 2+ pedal edema,.     MEDICATIONS:  Scheduled Meds:   epoetin rick-epbx  5,000 Units IntraVENous Q MWF    famotidine (PEPCID) injection  10 mg IntraVENous Daily    levothyroxine  25 mcg Oral Daily    lidocaine 1 % injection  5 mL IntraDERmal Once    sodium chloride flush  5-40 mL IntraVENous 2 times per day    midodrine  5 mg Per NG tube TID WC    sodium polystyrene  15 g Oral Once    [Held by provider] amiodarone  100 mg Oral Daily    rocuronium  0.6 mg/kg IntraVENous Once    albuterol  2.5 mg Nebulization Q6H    sodium chloride flush  5-40 mL IntraVENous 2 times per day    aspirin EC  81 mg Oral Daily    atorvastatin  40 mg Oral Daily    magnesium oxide  400 mg Oral BID    miconazole   Topical BID    ferrous sulfate  325 mg Oral Daily with breakfast     Continuous Infusions:   sodium chloride      dexmedetomidine 1.1 mcg/kg/hr (07/06/22 0625)    sodium chloride      norepinephrine Stopped (07/02/22 0516)    dextrose      sodium chloride 10 mL/hr at 07/06/22 0447    heparin (PORCINE) Infusion Stopped (07/05/22 0454)     PRN Meds:   sodium chloride flush, 5-40 mL, PRN  sodium chloride, 25 mL, PRN  sodium chloride, , PRN  fentanNYL, 50 mcg, Q2H PRN  heparin (porcine), 1,300 Units, PRN  heparin (porcine), 1,200 Units, PRN  glucose, 4 tablet, PRN  dextrose bolus, 125 mL, PRN   Or  dextrose bolus, 250 mL, PRN  glucagon (rDNA), 1 mg, PRN  dextrose, 100 mL/hr, PRN  sodium chloride flush, 5-40 mL, PRN  sodium chloride, , PRN  ondansetron, 4 mg, Q8H PRN   Or  ondansetron, 4 mg, Q6H PRN  polyethylene glycol, 17 g, Daily PRN  acetaminophen, 650 mg, Q6H PRN   Or  acetaminophen, 650 mg, Q6H PRN  heparin (porcine), 4,000 Units, PRN  heparin (porcine), 2,000 Units, PRN  morphine, 2 mg, Q4H PRN        SUPPORT DEVICES: [x] Ventilator [] BIPAP  [] Nasal Cannula [] Room Air    VENT SETTINGS (Comprehensive) (if applicable):  Vent Information  Ventilator ID: [id number]  Vent Mode: AC/PRVC  Additional Respiratory Assessments  Heart Rate: 91  Resp: 23  SpO2: 99 %  End Tidal CO2: 42 (%)  Position: Semi-Leung's  Humidification Source: HME  Cuff Pressure (cm H2O):  (mov)    ABGs:     Lab Results   Component Value Date/Time PHART 7.459 03/09/2022 04:28 AM    MUT9BZD 39.9 03/09/2022 04:28 AM    PO2ART 107.0 03/09/2022 04:28 AM    IEY9ZOS 28.3 03/09/2022 04:28 AM    Z3LGFSOF 97.4 03/09/2022 04:28 AM    FIO2 30.0 07/06/2022 04:40 AM     Lactic Acid:   Lab Results   Component Value Date/Time    LACTA 0.7 03/04/2022 02:30 PM    LACTA 1.2 02/18/2021 01:00 AM    LACTA 1.8 07/25/2020 11:20 AM         DATA:  Complete Blood Count:   Recent Labs     07/04/22  0006 07/04/22  1815 07/05/22  0354 07/05/22  0354 07/05/22  1057 07/05/22  1837 07/06/22  0456   WBC 7.1  --  7.8  --   --   --  8.1   HGB 7.5*   < > 7.0*   < > 7.0* 7.3* 7.0*   .8  --  96.6  --   --   --  101.7   *  --  See Reflexed IPF Result  --   --   --  See Reflexed IPF Result   RBC 2.48*  --  2.37*  --   --   --  2.33*   HCT 25.0*   < > 22.9*   < > 23.3* 23.9* 23.7*   MCH 30.2  --  29.5  --   --   --  30.0   MCHC 30.0  --  30.6  --   --   --  29.5   RDW 16.4*  --  16.4*  --   --   --  16.3*   MPV 10.2  --   --   --   --   --   --     < > = values in this interval not displayed. PT/INR:    Lab Results   Component Value Date/Time    PROTIME 10.8 06/14/2022 02:56 AM    INR 1.0 06/14/2022 02:56 AM     PTT:    Lab Results   Component Value Date/Time    APTT 32.6 07/05/2022 03:54 AM       Basal Metabolic Profile:   Recent Labs     07/04/22  0006 07/05/22  0354 07/06/22  0456   * 133* 133*   K 3.8 4.2 4.4   BUN 51* 29* 44*   CREATININE 2.10* 1.49* 2.02*   CL 97* 99 99   CO2 26 29 27      Magnesium:   Lab Results   Component Value Date/Time    MG 2.3 06/09/2022 06:00 AM    MG 2.4 06/07/2022 09:45 PM    MG 2.3 05/18/2022 02:19 PM     Phosphorus:   Lab Results   Component Value Date/Time    PHOS 2.2 07/02/2022 05:11 AM    PHOS 4.4 06/15/2022 08:07 AM    PHOS 4.6 05/18/2022 02:19 PM     S. Calcium:  Recent Labs     07/06/22 0456   CALCIUM 8.1*     S. Ionized Calcium:No results for input(s): IONCA in the last 72 hours.       Urinalysis:   Lab Results   Component Value Date/Time    NITRU NEGATIVE 06/27/2022 06:35 PM    COLORU Dark Yellow 06/27/2022 06:35 PM    PHUR 5.5 06/27/2022 06:35 PM    WBCUA 20 TO 50 06/27/2022 06:35 PM    RBCUA 20 TO 50 06/27/2022 06:35 PM    MUCUS 1+ 06/27/2022 06:35 PM    TRICHOMONAS NOT REPORTED 02/07/2022 02:42 AM    YEAST NOT REPORTED 02/07/2022 02:42 AM    BACTERIA FEW 06/12/2022 09:33 PM    SPECGRAV 1.030 06/27/2022 06:35 PM    LEUKOCYTESUR SMALL 06/27/2022 06:35 PM    UROBILINOGEN Normal 06/27/2022 06:35 PM    BILIRUBINUR NEGATIVE 06/27/2022 06:35 PM    GLUCOSEU 1+ 06/27/2022 06:35 PM    KETUA TRACE 06/27/2022 06:35 PM    AMORPHOUS NOT REPORTED 02/07/2022 02:42 AM       CARDIAC ENZYMES: No results for input(s): CKMB, CKMBINDEX, TROPONINI in the last 72 hours. Invalid input(s): CKTOTAL;3  BNP: No results for input(s): BNP in the last 72 hours. LFTS  No results for input(s): ALKPHOS, ALT, AST, BILITOT, BILIDIR, LABALBU in the last 72 hours. AMYLASE/LIPASE/AMMONIA  Recent Labs     07/04/22  1450   AMMONIA 20       Last 3 Blood Glucose:   Recent Labs     07/04/22  0006 07/05/22  0354 07/06/22  0456   GLUCOSE 169* 146* 172*      HgBA1c:    Lab Results   Component Value Date/Time    LABA1C 4.6 03/31/2022 11:21 AM         TSH:    Lab Results   Component Value Date/Time    TSH 9.54 06/27/2022 12:44 PM     ANEMIA STUDIES  No results for input(s): LABIRON, TIBC, FERRITIN, GMCYUMEM96, FOLATE, OCCULTBLD in the last 72 hours.       Cultures during this admission:     Blood cultures:                 [] None drawn      [x] Negative             []  Positive (Details:  )  Urine Culture:                   [] None drawn      [x] Negative             []  Positive (Details:pend  )  Sputum Culture:               [] None drawn       [] Negative             []  Positive (Details:  )   Endotracheal aspirate:     [] None drawn       [] Negative             []  Positive (Details:  )        ASSESSMENT:     Principal Problem:    Bradycardia  Active Problems: Lymphedema    Acute kidney injury superimposed on CKD (HCC)    Hyperkalemia    Shock ([address]    Leukocytosis    Acute respiratory failure with hypoxia and hypercapnia (HCC)    Pulmonary edema cardiac cause (HCC)    Dependence on renal dialysis (HCC)    Nephrotic range proteinuria    Arterial hypotension    Acute on chronic diastolic congestive heart failure (HCC)    JOY (acute kidney injury) ([address]  Resolved Problems:    * No resolved hospital problems. *  Acute kidney injury versus progression of underlying kidney disease, oliguric dependent on dialysis  Hyperkalemia-resolved        PLAN:   Neurologic:   · Neuro checks per protocol. · Worsening mentatation in last two weeks before coming in per daughter. baseline status otherwise normal.   · CT of head performed in ED. Suggestive of 2.6 cm anterior right temporal meningioma with mild increase in vasogenic edema  · Previous CT's reviewed. · On seroquil at night  · Neurology signed off  with no new recommendations  · 7/6/2022  . will extubate , restart amio   ·   Cardiovascular:RESOLVED         Sinus Bradycardia   ? Sinus bradycardia with hyperkalemia and hypothermia on admission  ? Patient is currently off of the dopamine gtt. and epinephrine  ? Will restart amiodarone 100mg today as patient is in   ? Cardiology signed off   ? Continue to monitor       History of paroxysmal Atrial Fibrillation - CHADS VASC score of 6  · Patient is on Eliquis at home, hemodynamically stable, not in RVR. · Heparin is on hold due to low hb 7, no visible signs of bleeding, repeat hemoglobin was 7. Continue to hold Eliquis. · amio restarted 7/6/2022. Monitor hr and bp. .Chronic Diastolic Heart Failure  · Recent echo showed EF of 60 to 65%.       Pulmonary:  COPD   Patient was recently started on home oxygen 2 to 3 L.           Continue to remain intubated and sedated with precedex 1.1          vent settings:was cpaping int he morning   Scattered infiltrates with bilateral effusions . Off antibiotics     GI/Nutrition  · Glycolax PRN  · Ulcer Prophylaxis: famotidine 10 mg iv daily  given mec vent>48h+on antiplatelet agent (ASX84)  · Diet:On TF     Renal/Fluid/Electrolyte  JOY on CKD Stage 4  · Creatinine 2.13 on admission with baseline around 1.7  . 2.02  today  · Most likely pre-renal secondary to decreased perfusion due to pulmonary vascular congestion. · Patient got 3 session of HD  On 30th , 2nd and 4th   ,NEPHRO on board . Likely 4th session today   · 2.02  today     ID  · Scattered infiltrates with bilateral pleural effusions present on chest x-ray . Patient upon appropriate antibiotics, cefepime for hospital versus community-acquired pneumonia. We will continue to trend white count, manage fevers if  present. White blood cell count stable   · 7/3/2022 monitor off antibiotics       Hematology:      · Recent Labs     06/27/22  1244   HGB 10.5*   · Stable. Daily CBC.      Endocrine:   ? Hx of diabetes mellitus not on any home medications  ? Most recent HbA1c 4.6 from 3/31/2022  ? Will continue to monitor-BGs controlled <180  ? On TF  ? tsh 9.4 on synthyroid 25     DVT Prophylaxis  ? Heparin on hold as of now . restart once hb stabilize        Avinash Steen MD,        Resident internal medicine, PGY-3  [id number] [id number] ([facility])             7/6/2022, 8:41 AM

## 2022-07-07 LAB
ABO/RH: NORMAL
ABSOLUTE EOS #: 0 K/UL (ref 0–0.4)
ABSOLUTE IMMATURE GRANULOCYTE: 0.51 K/UL (ref 0–0.3)
ABSOLUTE LYMPH #: 1.62 K/UL (ref 1–4.8)
ABSOLUTE MONO #: 0.51 K/UL (ref 0.1–0.8)
ANION GAP SERPL CALCULATED.3IONS-SCNC: 9 MMOL/L (ref 9–17)
ANTIBODY SCREEN: NEGATIVE
ARM BAND NUMBER: NORMAL
BASOPHILS # BLD: 0 % (ref 0–2)
BASOPHILS ABSOLUTE: 0 K/UL (ref 0–0.2)
BLD PROD TYP BPU: NORMAL
BLOOD BANK BLOOD PRODUCT EXPIRATION DATE: NORMAL
BLOOD BANK ISBT PRODUCT BLOOD TYPE: 600
BLOOD BANK PRODUCT CODE: NORMAL
BLOOD BANK UNIT TYPE AND RH: NORMAL
BPU ID: NORMAL
BUN BLDV-MCNC: 23 MG/DL (ref 8–23)
CALCIUM SERPL-MCNC: 7.8 MG/DL (ref 8.6–10.4)
CHLORIDE BLD-SCNC: 100 MMOL/L (ref 98–107)
CO2: 27 MMOL/L (ref 20–31)
CREAT SERPL-MCNC: 1.35 MG/DL (ref 0.5–0.9)
CROSSMATCH RESULT: NORMAL
DISPENSE STATUS BLOOD BANK: NORMAL
EOSINOPHILS RELATIVE PERCENT: 0 % (ref 1–4)
EXPIRATION DATE: NORMAL
GFR AFRICAN AMERICAN: 47 ML/MIN
GFR NON-AFRICAN AMERICAN: 39 ML/MIN
GFR SERPL CREATININE-BSD FRML MDRD: ABNORMAL ML/MIN/{1.73_M2}
GLUCOSE BLD-MCNC: 111 MG/DL (ref 70–99)
HCT VFR BLD CALC: 27.5 % (ref 36.3–47.1)
HCT VFR BLD CALC: 31.5 % (ref 36.3–47.1)
HEMOGLOBIN: 8.1 G/DL (ref 11.9–15.1)
HEMOGLOBIN: 9.4 G/DL (ref 11.9–15.1)
IMMATURE GRANULOCYTES: 6 %
LYMPHOCYTES # BLD: 19 % (ref 24–44)
MCH RBC QN AUTO: 30.1 PG (ref 25.2–33.5)
MCHC RBC AUTO-ENTMCNC: 29.5 G/DL (ref 28.4–34.8)
MCV RBC AUTO: 102.2 FL (ref 82.6–102.9)
MONOCYTES # BLD: 6 % (ref 1–7)
MORPHOLOGY: ABNORMAL
NRBC AUTOMATED: 0.2 PER 100 WBC
PARTIAL THROMBOPLASTIN TIME: 29 SEC (ref 20.5–30.5)
PARTIAL THROMBOPLASTIN TIME: 36.7 SEC (ref 20.5–30.5)
PDW BLD-RTO: 17.3 % (ref 11.8–14.4)
PLATELET # BLD: 127 K/UL (ref 138–453)
PMV BLD AUTO: 9.7 FL (ref 8.1–13.5)
POTASSIUM SERPL-SCNC: 3.7 MMOL/L (ref 3.7–5.3)
RBC # BLD: 2.69 M/UL (ref 3.95–5.11)
SEG NEUTROPHILS: 69 % (ref 36–66)
SEGMENTED NEUTROPHILS ABSOLUTE COUNT: 5.86 K/UL (ref 1.8–7.7)
SODIUM BLD-SCNC: 136 MMOL/L (ref 135–144)
TRANSFUSION STATUS: NORMAL
UNIT DIVISION: 0
UNIT ISSUE DATE/TIME: NORMAL
WBC # BLD: 8.5 K/UL (ref 3.5–11.3)

## 2022-07-07 PROCEDURE — 6360000002 HC RX W HCPCS: Performed by: STUDENT IN AN ORGANIZED HEALTH CARE EDUCATION/TRAINING PROGRAM

## 2022-07-07 PROCEDURE — 51798 US URINE CAPACITY MEASURE: CPT

## 2022-07-07 PROCEDURE — 97530 THERAPEUTIC ACTIVITIES: CPT

## 2022-07-07 PROCEDURE — 2500000003 HC RX 250 WO HCPCS: Performed by: STUDENT IN AN ORGANIZED HEALTH CARE EDUCATION/TRAINING PROGRAM

## 2022-07-07 PROCEDURE — 97163 PT EVAL HIGH COMPLEX 45 MIN: CPT

## 2022-07-07 PROCEDURE — 85014 HEMATOCRIT: CPT

## 2022-07-07 PROCEDURE — 85730 THROMBOPLASTIN TIME PARTIAL: CPT

## 2022-07-07 PROCEDURE — 2580000003 HC RX 258: Performed by: STUDENT IN AN ORGANIZED HEALTH CARE EDUCATION/TRAINING PROGRAM

## 2022-07-07 PROCEDURE — 94761 N-INVAS EAR/PLS OXIMETRY MLT: CPT

## 2022-07-07 PROCEDURE — 99232 SBSQ HOSP IP/OBS MODERATE 35: CPT | Performed by: INTERNAL MEDICINE

## 2022-07-07 PROCEDURE — 51701 INSERT BLADDER CATHETER: CPT

## 2022-07-07 PROCEDURE — 6370000000 HC RX 637 (ALT 250 FOR IP)

## 2022-07-07 PROCEDURE — 97110 THERAPEUTIC EXERCISES: CPT

## 2022-07-07 PROCEDURE — 85025 COMPLETE CBC W/AUTO DIFF WBC: CPT

## 2022-07-07 PROCEDURE — 99291 CRITICAL CARE FIRST HOUR: CPT | Performed by: INTERNAL MEDICINE

## 2022-07-07 PROCEDURE — 36415 COLL VENOUS BLD VENIPUNCTURE: CPT

## 2022-07-07 PROCEDURE — 6370000000 HC RX 637 (ALT 250 FOR IP): Performed by: INTERNAL MEDICINE

## 2022-07-07 PROCEDURE — 6360000002 HC RX W HCPCS: Performed by: INTERNAL MEDICINE

## 2022-07-07 PROCEDURE — 6370000000 HC RX 637 (ALT 250 FOR IP): Performed by: STUDENT IN AN ORGANIZED HEALTH CARE EDUCATION/TRAINING PROGRAM

## 2022-07-07 PROCEDURE — 2060000000 HC ICU INTERMEDIATE R&B

## 2022-07-07 PROCEDURE — 94640 AIRWAY INHALATION TREATMENT: CPT

## 2022-07-07 PROCEDURE — 85018 HEMOGLOBIN: CPT

## 2022-07-07 PROCEDURE — 2700000000 HC OXYGEN THERAPY PER DAY

## 2022-07-07 PROCEDURE — 80048 BASIC METABOLIC PNL TOTAL CA: CPT

## 2022-07-07 RX ORDER — LORAZEPAM 0.5 MG/1
0.5 TABLET ORAL ONCE
Status: COMPLETED | OUTPATIENT
Start: 2022-07-07 | End: 2022-07-07

## 2022-07-07 RX ORDER — LORAZEPAM 0.5 MG/1
0.5 TABLET ORAL EVERY 6 HOURS PRN
Status: DISCONTINUED | OUTPATIENT
Start: 2022-07-07 | End: 2022-07-08

## 2022-07-07 RX ORDER — ALPRAZOLAM 0.5 MG/1
0.5 TABLET ORAL ONCE
Status: COMPLETED | OUTPATIENT
Start: 2022-07-07 | End: 2022-07-07

## 2022-07-07 RX ADMIN — ALBUTEROL SULFATE 2.5 MG: 2.5 SOLUTION RESPIRATORY (INHALATION) at 20:29

## 2022-07-07 RX ADMIN — LORAZEPAM 0.5 MG: 0.5 TABLET ORAL at 17:40

## 2022-07-07 RX ADMIN — SODIUM CHLORIDE, PRESERVATIVE FREE 10 ML: 5 INJECTION INTRAVENOUS at 07:31

## 2022-07-07 RX ADMIN — ANTI-FUNGAL POWDER MICONAZOLE NITRATE TALC FREE: 1.42 POWDER TOPICAL at 21:13

## 2022-07-07 RX ADMIN — SODIUM CHLORIDE: 9 INJECTION, SOLUTION INTRAVENOUS at 13:08

## 2022-07-07 RX ADMIN — MAGNESIUM GLUCONATE 500 MG ORAL TABLET 400 MG: 500 TABLET ORAL at 21:13

## 2022-07-07 RX ADMIN — SODIUM CHLORIDE 75 ML/HR: 9 INJECTION, SOLUTION INTRAVENOUS at 00:18

## 2022-07-07 RX ADMIN — DEXMEDETOMIDINE HYDROCHLORIDE 0.8 MCG/KG/HR: 4 INJECTION, SOLUTION INTRAVENOUS at 00:17

## 2022-07-07 RX ADMIN — SODIUM CHLORIDE, PRESERVATIVE FREE 10 ML: 5 INJECTION INTRAVENOUS at 21:14

## 2022-07-07 RX ADMIN — ALPRAZOLAM 0.5 MG: 0.5 TABLET ORAL at 18:29

## 2022-07-07 RX ADMIN — ALBUTEROL SULFATE 2.5 MG: 2.5 SOLUTION RESPIRATORY (INHALATION) at 16:35

## 2022-07-07 RX ADMIN — ALBUTEROL SULFATE 2.5 MG: 2.5 SOLUTION RESPIRATORY (INHALATION) at 07:48

## 2022-07-07 RX ADMIN — DEXMEDETOMIDINE HYDROCHLORIDE 0.9 MCG/KG/HR: 4 INJECTION, SOLUTION INTRAVENOUS at 07:08

## 2022-07-07 RX ADMIN — ANTI-FUNGAL POWDER MICONAZOLE NITRATE TALC FREE: 1.42 POWDER TOPICAL at 09:54

## 2022-07-07 RX ADMIN — HEPARIN SODIUM 2000 UNITS: 1000 INJECTION INTRAVENOUS; SUBCUTANEOUS at 19:14

## 2022-07-07 RX ADMIN — SODIUM CHLORIDE, PRESERVATIVE FREE 10 MG: 5 INJECTION INTRAVENOUS at 09:54

## 2022-07-07 RX ADMIN — MORPHINE SULFATE 2 MG: 2 INJECTION, SOLUTION INTRAMUSCULAR; INTRAVENOUS at 16:39

## 2022-07-07 RX ADMIN — SODIUM CHLORIDE, PRESERVATIVE FREE 10 ML: 5 INJECTION INTRAVENOUS at 21:13

## 2022-07-07 RX ADMIN — ALBUTEROL SULFATE 2.5 MG: 2.5 SOLUTION RESPIRATORY (INHALATION) at 02:33

## 2022-07-07 RX ADMIN — AMIODARONE HYDROCHLORIDE 100 MG: 200 TABLET ORAL at 13:25

## 2022-07-07 ASSESSMENT — ENCOUNTER SYMPTOMS
GASTROINTESTINAL NEGATIVE: 1
RESPIRATORY NEGATIVE: 1

## 2022-07-07 ASSESSMENT — PAIN SCALES - GENERAL: PAINLEVEL_OUTOF10: 0

## 2022-07-07 NOTE — CARE COORDINATION
Transitional planning. Pt on 2 LNC, Wound care for Chronic BLE wounds. Per Nephrology, pt will need M-W-F HD, SW working on placement. Spoke to Uvaldo Duane with [address], pt's insurance will not do one-time contract and pt condition may no longer qualify for LTACH. Spoke to pt's daughter Rebeca Singh and discussed dc plans. Per Freedom of choice, referrals sent in order of choice to:  1) [address] [facility]  2) [facility] (formerly [email or web address])  3) [address] [facility] with [facility] called, they do not take pt's insurance. [address] with [facility] called, they do not take pt's insurance. [facility],    Looked up [email or web address]  Provided pt's daughter Rebeca Singh with [facility] SNF list.     6997 spoke to [facility] in Registration. Pt has Medicare part B coverage only.

## 2022-07-07 NOTE — PLAN OF CARE
Problem: Respiratory - Adult  Goal: Achieves optimal ventilation and oxygenation  7/6/2022 2125 by Vijaya Miner RCP  Outcome: Progressing  Flowsheets  Taken 7/6/2022 2125 by Yohan Miner RCP  Achieves optimal ventilation and oxygenation:   Assess for changes in respiratory status   Assess the need for suctioning and aspirate as needed   Respiratory therapy support as indicated   Assess for changes in mentation and behavior   Oxygen supplementation based on oxygen saturation or arterial blood gases   Encourage broncho-pulmonary hygiene including cough, deep breathe, incentive spirometry   Assess and instruct to report shortness of breath or any respiratory difficulty  Taken 7/6/2022 2000 by Zak Ordoñez RN  Achieves optimal ventilation and oxygenation: Assess for changes in respiratory status

## 2022-07-07 NOTE — PROGRESS NOTES
Renal Progress Note    Patient :  Viky Copeland; 66 y.o. MRN# [id number]  Location:  3020/3020-01  Attending:  Eric Crabtree MD  Admit Date:  6/27/2022   Hospital Day: 10    Subjective:     Patient was seen and examined. No new issues reported overnight. Patient had regular dialysis done yesterday and for 210 minutes and got about 2 L removed. She was extubated 7/6/2022. Now off of pressor support. JOY on HD, currently on MWF schedule. BMP results from today reviewed, electrolytes stable with potassium 3.7 and bicarb 27. Urine output documented as about 400 cc in the last 24 hours.  working towards outpatient hemodialysis set up versus LTAC.   Outpatient Medications:     Medications Prior to Admission: ferrous sulfate (IRON 325) 325 (65 Fe) MG tablet, Take 1 tablet by mouth daily (with breakfast)  apixaban (ELIQUIS) 2.5 MG TABS tablet, Take 1 tablet by mouth 2 times daily  apixaban (ELIQUIS) 2.5 MG TABS tablet, Take 1 tablet by mouth 2 times daily  ferrous sulfate (IRON 325) 325 (65 Fe) MG tablet, Take 1 tablet by mouth daily (with breakfast)  fluticasone-salmeterol (ADVAIR HFA) 115-21 MCG/ACT inhaler, Inhale 2 puffs into the lungs 2 times daily  metoprolol tartrate (LOPRESSOR) 25 MG tablet, Take 1 tablet by mouth 2 times daily  aspirin EC 81 MG EC tablet, Take 1 tablet by mouth daily  hydrALAZINE (APRESOLINE) 25 MG tablet, Take 1 tablet by mouth 3 times daily  albuterol sulfate HFA (PROAIR HFA) 108 (90 Base) MCG/ACT inhaler, Inhale 2 puffs into the lungs every 6 hours as needed for Wheezing or Shortness of Breath (cough)  metoprolol tartrate (LOPRESSOR) 25 MG tablet, Take 1 tablet by mouth 2 times daily  aspirin EC 81 MG EC tablet, Take 1 tablet by mouth daily  hydrALAZINE (APRESOLINE) 25 MG tablet, Take 1 tablet by mouth 3 times daily  albuterol sulfate HFA (PROAIR HFA) 108 (90 Base) MCG/ACT inhaler, Inhale 2 puffs into the lungs every 6 hours as needed for Wheezing or Shortness of Breath (cough)  fluticasone-salmeterol (ADVAIR HFA) 115-21 MCG/ACT inhaler, Inhale 2 puffs into the lungs 2 times daily Maintenance inhaler  amLODIPine (NORVASC) 10 MG tablet, Take 1 tablet by mouth nightly  Blood Pressure KIT, Diagnosis: HTN. Needs to check blood pressure 1-2 times a day until stable, then once a day. Goal blood pressure less than 135/85, and above 110/60. vitamin D (ERGOCALCIFEROL) 1.25 MG (58327 UT) CAPS capsule, Take 1 capsule by mouth once a week Sundays  amiodarone (CORDARONE) 200 MG tablet, Take 1 tablet by mouth daily  Nebulizers (COMPRESSOR/NEBULIZER) MISC, Nebulizer with compressor. Disposable Med Nebs 2 /month. Reusable Med Nebs 1 per 6 months. Aerosol Mask 1 per month. Replacement Filters 2 per month. All other related supplies as needed per month. Medications being used: Albuterol . 083 unit dose vial. Frequency: every 6 hrs x 4/day . Length of Need: 1 (Patient not taking: Reported on 6/17/2022)  albuterol (PROVENTIL) (2.5 MG/3ML) 0.083% nebulizer solution, Take 3 mLs by nebulization every 6 hours as needed for Wheezing or Shortness of Breath (Patient not taking: Reported on 6/17/2022)  Misc. Devices (ADJUST FOLD CANE/YORK HANDLE) MISC, Use daily for walking  Handicap Placard MISC, by Does not apply route Expires on 12/30/25  atorvastatin (LIPITOR) 40 MG tablet, Take 1 tablet by mouth once daily  desloratadine (CLARINEX) 5 MG tablet, Take 1 tablet by mouth once daily  FreeStyle Lancets MISC, 1 each by Does not apply route daily Patient needs to contact office before any further refills will be approved  magnesium oxide (MAG-OX) 400 (241.3 Mg) MG TABS tablet, Take 1 tablet by mouth twice daily  miconazole (MICOTIN) 2 % powder, Apply topically 2 times daily UNDER THE SKIN FOLDS LONG TERM  Multiple Vitamins-Minerals (THERAPEUTIC MULTIVITAMIN-MINERALS) tablet, Take 1 tablet by mouth daily  blood glucose test strips (FREESTYLE LITE) strip, 1 each by In Vitro route daily As needed.   Blood Pressure KIT, 1 kit by Does not apply route three times daily (Patient not taking: Reported on 6/17/2022)  blood glucose monitor kit and supplies, 1 kit by Other route three times daily Dispense Butterfly Elite CBG Device    Current Medications:     Scheduled Meds:    epoetin rick-epbx  5,000 Units IntraVENous Q MWF    docusate  100 mg Oral Daily    famotidine (PEPCID) injection  10 mg IntraVENous Daily    levothyroxine  25 mcg Oral Daily    lidocaine 1 % injection  5 mL IntraDERmal Once    sodium chloride flush  5-40 mL IntraVENous 2 times per day    midodrine  5 mg Per NG tube TID WC    sodium polystyrene  15 g Oral Once    amiodarone  100 mg Oral Daily    rocuronium  0.6 mg/kg IntraVENous Once    albuterol  2.5 mg Nebulization Q6H    sodium chloride flush  5-40 mL IntraVENous 2 times per day    aspirin EC  81 mg Oral Daily    atorvastatin  40 mg Oral Daily    magnesium oxide  400 mg Oral BID    miconazole   Topical BID    ferrous sulfate  325 mg Oral Daily with breakfast     Continuous Infusions:    sodium chloride      sodium chloride 75 mL/hr at 07/07/22 0600    sodium chloride      dexmedetomidine 0.6 mcg/kg/hr (07/07/22 1120)    sodium chloride      dextrose      sodium chloride 75 mL/hr at 07/06/22 2000    heparin (PORCINE) Infusion 12 Units/kg/hr (07/07/22 0747)     PRN Meds:  sodium chloride, albumin human, sodium chloride flush, sodium chloride, sodium chloride, fentanNYL, heparin (porcine), heparin (porcine), glucose, dextrose bolus **OR** dextrose bolus, glucagon (rDNA), dextrose, sodium chloride flush, sodium chloride, ondansetron **OR** ondansetron, polyethylene glycol, acetaminophen **OR** acetaminophen, heparin (porcine), heparin (porcine), morphine    Input/Output:       I/O last 3 completed shifts: In: 5050.4 [I.V.:3030.4; Blood:500; NG/GT:720]  Out: 3200 [Urine:400].       Patient Vitals for the past 96 hrs (Last 3 readings):   Weight   07/07/22 0600 148 lb 13 oz (67.5 kg)   07/06/22 1445 149 lb 7.6 oz (67.8 kg)   22 1049 153 lb 7 oz (69.6 kg)       Vital Signs:   Temperature:  Temp: 97.5 °F (36.4 °C)  TMax:   Temp (24hrs), Av.7 °F (36.5 °C), Min:96.8 °F (36 °C), Max:98.4 °F (36.9 °C)    Respirations:  Resp: 16  Pulse:   Heart Rate: 75  BP:    BP: (!) 134/96  BP Range: Systolic (58YSK), SFL:305 , Min:77 , UXA:635       Diastolic (25OWR), IRINA:77, Min:34, Max:146      Physical Examination:     General:  Extubated now, alert and awake and doing better. HEENT: Atraumatic, normocephalic, no throat congestion, moist mucosa. Eyes:   Pupils equal, round and reactive to light. Neck:   Supple  Chest:   Bilateral vesicular breath sounds, no rales or wheezes. Cardiac:  S1 S2 RR, no murmurs, gallops or rubs. Abdomen: Soft, no masses or organomegaly, BS audible. :   No suprapubic or flank tenderness. Neuro:  Extubated now alert and awake, no acute distress. SKIN:  No rashes, good skin turgor. Extremities:  +ve dependent and trace LE edema. Labs:       Recent Labs     22  0354 22  1057 22  0456 22  1757 22  0447   WBC 7.8  --  8.1  --  8.5   RBC 2.37*  --  2.33*  --  2.69*   HGB 7.0*   < > 7.0* 8.8* 8.1*   HCT 22.9*   < > 23.7* 28.2* 27.5*   MCV 96.6  --  101.7  --  102.2   MCH 29.5  --  30.0  --  30.1   MCHC 30.6  --  29.5  --  29.5   RDW 16.4*  --  16.3*  --  17.3*   PLT See Reflexed IPF Result  --  See Reflexed IPF Result  --  127*   MPV  --   --   --   --  9.7    < > = values in this interval not displayed.       BMP:   Recent Labs     22  0354 22  0456 22  0447   * 133* 136   K 4.2 4.4 3.7   CL 99 99 100   CO2 29 27 27   BUN 29* 44* 23   CREATININE 1.49* 2.02* 1.35*   GLUCOSE 146* 172* 111*   CALCIUM 7.6* 8.1* 7.8*     YRIS:      Lab Results   Component Value Date/Time    YRIS NEGATIVE 10/27/2020 04:37 PM     SPEP:  Lab Results   Component Value Date/Time    PROT 5.3 2022 02:59 PM    ALBCAL 2.5 2022 03:00 PM    ALBPCT 48 06/01/2022 03:00 PM    LABALPH 0.3 06/01/2022 03:00 PM    LABALPH 1.2 06/01/2022 03:00 PM    A1PCT 6 06/01/2022 03:00 PM    A2PCT 23 06/01/2022 03:00 PM    LABBETA 0.7 06/01/2022 03:00 PM    BETAPCT 13 06/01/2022 03:00 PM    GAMGLOB 0.5 06/01/2022 03:00 PM    GGPCT 10 06/01/2022 03:00 PM    PATH ELECTRONICALLY SIGNED. Demarcus Crain M.D. 06/01/2022 03:00 PM    PATH ELECTRONICALLY SIGNED. Demarcus Crain M.D. 06/01/2022 03:00 PM     UPEP:     Lab Results   Component Value Date/Time    LABPE  10/27/2020 04:35 PM     ELEVATED PROTEIN CONCENTRATION. MOST SERUM PROTEINS ARE DETECTED IN THIS URINE.   USUALLY OBSERVED WITH MARKEDLY INCREASED NON-SELECTIVE GLOMERULAR PERMEABILITY (i.e. SEVERE GLOMERULAR DISEASE), CONTAMINATION OF     C3:     Lab Results   Component Value Date/Time    C3 147 10/27/2020 04:37 PM     C4:     Lab Results   Component Value Date/Time    C4 29 10/27/2020 04:37 PM     MPO ANCA:     Lab Results   Component Value Date/Time    MPO 5 10/27/2020 04:37 PM     PR3 ANCA:     Lab Results   Component Value Date/Time    PR3 16 10/27/2020 04:37 PM     Hep BsAg:         Lab Results   Component Value Date/Time    HEPBSAG NONREACTIVE 06/28/2022 02:56 PM     Hep C AB:          Lab Results   Component Value Date/Time    HEPCAB NONREACTIVE 06/28/2022 02:56 PM     Urinalysis/Chemistries:      Lab Results   Component Value Date/Time    NITRU NEGATIVE 06/27/2022 06:35 PM    COLORU Dark Yellow 06/27/2022 06:35 PM    PHUR 5.5 06/27/2022 06:35 PM    WBCUA 20 TO 50 06/27/2022 06:35 PM    RBCUA 20 TO 50 06/27/2022 06:35 PM    MUCUS 1+ 06/27/2022 06:35 PM    TRICHOMONAS NOT REPORTED 02/07/2022 02:42 AM    YEAST NOT REPORTED 02/07/2022 02:42 AM    BACTERIA FEW 06/12/2022 09:33 PM    SPECGRAV 1.030 06/27/2022 06:35 PM    LEUKOCYTESUR SMALL 06/27/2022 06:35 PM    UROBILINOGEN Normal 06/27/2022 06:35 PM    BILIRUBINUR NEGATIVE 06/27/2022 06:35 PM    GLUCOSEU 1+ 06/27/2022 06:35 PM    KETUA TRACE 06/27/2022 06:35 PM AMORPHOUS NOT REPORTED 02/07/2022 02:42 AM     Urine Sodium:     Lab Results   Component Value Date/Time    SLOAN <20 06/08/2022 09:52 PM      Urine Creatinine:     Lab Results   Component Value Date/Time    LABCREA 63.0 06/08/2022 09:52 PM     Radiology:     Reviewed. Assessment:     1. JOY currently HD dependent, likely secondary to ATN from hypotension requiring pressor support vs progression from underlying diabetic nephropathy, getting dialysis as per [facility] schedule. Hemodialysis initiated on 6/28/2022. 2. Hyperkalemia likely from progressive decline in renal function, now improved with dialysis. 3. Nephrotic range proteinuria likely due to diabetic nephropathy. 4. Diabetes type 2.  5. Currently on mechanical ventilation. 6. Nonhealing lower extremity ulcers. 7. Hypotension did require pressor support this admission. 8. Fluid overload  9. Drop in hemoglobin. Plan:   1. No acute need for dialysis today. Will get regular dialysis in a.m. as per [facility] schedule. 2. Will likely require tunneled catheter placement if continues to require HD. 3. Blood transfusion 1 unit ordered for dialysis. 4. Monitor strict I/Os and renal function for signs of improvement. 5. Bladder scan every shift and St. Cath if >250 cc.   6. Stop IV fluids. 7. BMP in AM.  8.  working towards setting up outpatient dialysis spot. 9. Will follow. Nutrition   Please ensure that patient is on a renal diet/TF. Avoid nephrotoxic drugs/contrast exposure. Vamsi Reid Cap, MD  [facility]     This note is created with the assistance of a speech-recognition program. While intending to generate a document that actually reflects the content of the visit, no guarantees can be provided that every mistake has been identified and corrected by editing.

## 2022-07-07 NOTE — PROGRESS NOTES
[facility]  Progress Note    Herbert Zuniga  MRN: [id number]  Date: 7/7/2022  LOS: 8     Reason for F/U :   Concern for septic shock    Impression :   1. Acute respiratory insufficiency- intubated 6/28/2022  · Extubated 7/7/2022  2. Severe bradycardia-resolved  · requiring dopamine and Levophed for blood pressure support  · Able to wean off the dopamine 6/29/22  3. Pulmonary edema  4. Acute kidney injury on chronic kidney disease  · Started on hemodialysis 6/28/2022  5. Leukocytosis-unclear etiology at this time  6. Shock- resolved  7. Multiple/bilateral lower extremity ulcerations-superficial not acutely infected  8. Coccygeal ulceration/right gluteal ulceration-clean    Recommendations:   · The patient received:  · 1 dose of levofloxacin on 6/27/2022   · Cefepime 6/28 through 7/2/2022  · The culture data has remained negative with no evidence of an acute infectious process. · The patient has been off antimicrobial therapy and remained stable. · The plan is for spontaneous breathing trials today and attempt to wean. Infection Control Recommendations:   Universal precautions    Discharge Planning:   Estimated Length of IV antimicrobials: 7/2/22  Patient will need Midline Catheter Insertion/ PICC line Insertion: No  Patient will need: Home IV , [place],  SNF,  LTAC: Undetermined  Patient willneed outpatient wound care: No    Medical Decision making / Summary of Stay:   Herbert Zuniga is a 66y.o.-year-old female who was initially admitted on 6/27/2022.    Tea Mow has a history of diabetes mellitus type 2 with associated chronic kidney disease stage IV, hyperlipidemia, coronary artery disease, hypertension, asthma, previous CVA, acute on chronic combined systolic and diastolic heart failure, chronic left lower extremity wound for which she follows in the wound care center, meningioma     The patient was short of breath at home O2 sats in the low 90% on oxygen.     The patient was reported to be more fatigued than normal and the heart rate was found to be 35 and the patient was started on transcutaneous monitoring and atropine was given by EMS. The patient had a recent 48-hour Holter monitor showing sinus rhythm with some PACs earlier this month. The patient has required epinephrine for blood pressure support as this was switched to dopamine and Levophed which she continues currently. She is on BiPAP and currently nonconversant. She has developed acute kidney injury and will be started on hemodialysis. The chest x-ray shows findings consistent with pulmonary edema/congestion and the patient has been lethargic since admission. I was asked to evaluate for leukocytosis and bilateral lower extremity leg ulcers. The patient    2022  She did have a spontaneous breathing trial attempt earlier today and the respiratory therapist does report that she got quite agitated and tachypneic and did not last even an hour today. She is getting some fentanyl      Current evaluation:2022    BP (!) 148/48   Pulse 74   Temp 97.2 °F (36.2 °C) (Axillary)   Resp 16   Ht 4' 11\" (1.499 m)   Wt 148 lb 13 oz (67.5 kg)   SpO2 98%   BMI 30.06 kg/m²     Temperature Range: Temp: 97.2 °F (36.2 °C) Temp  Av.9 °F (36.6 °C)  Min: 96.8 °F (36 °C)  Max: 98.8 °F (37.1 °C)  The patient is seen and evaluated at bedside she was extubated yesterday and is currently sitting in the chair awake and alert and doing well. Her daughter is in the room with her and she currently does not have any acute complaints. No abdominal pain nausea vomiting or diarrhea. Patient is on Precedex    Review of Systems   Constitutional: Negative. Respiratory: Negative. Cardiovascular: Negative. Gastrointestinal: Negative. Genitourinary: Negative. Musculoskeletal: Negative. Skin: Negative. Neurological: Negative. Psychiatric/Behavioral: Negative.         Physical Examination :     Physical Exam  Constitutional:       Appearance: She is well-developed. HENT:      Head: Normocephalic and atraumatic. Cardiovascular:      Rate and Rhythm: Regular rhythm. Heart sounds: Murmur heard. Pulmonary:      Effort: Pulmonary effort is normal.      Breath sounds: Normal breath sounds. Abdominal:      General: Bowel sounds are normal.      Palpations: Abdomen is soft. Musculoskeletal:         General: Swelling (Bilateral upper extremities) present. Skin:     General: Skin is warm and dry. Comments: The legs and feet are currently in wrapped in Unna boots   Neurological:      Mental Status: She is alert and oriented to person, place, and time. Laboratory data:   I have independently reviewed the followinglabs:  CBC with Differential:   Recent Labs     07/06/22  0456 07/06/22  0456 07/06/22  1757 07/07/22 0447   WBC 8.1  --   --  8.5   HGB 7.0*   < > 8.8* 8.1*   HCT 23.7*   < > 28.2* 27.5*   PLT See Reflexed IPF Result  --   --  127*   LYMPHOPCT 10*  --   --  19*   MONOPCT 18*  --   --  6    < > = values in this interval not displayed. BMP:   Recent Labs     07/06/22  0456 07/07/22 0447   * 136   K 4.4 3.7   CL 99 100   CO2 27 27   BUN 44* 23   CREATININE 2.02* 1.35*     Hepatic Function Panel:   No results for input(s): PROT, LABALBU, BILIDIR, IBILI, BILITOT, ALKPHOS, ALT, AST in the last 72 hours.       Lab Results   Component Value Date/Time    PROCAL 0.11 06/27/2022 12:44 PM    PROCAL 0.26 03/07/2022 05:53 AM     Lab Results   Component Value Date/Time    CRP <3.0 03/01/2022 10:07 PM    CRP <3.0 02/07/2022 06:23 AM     Lab Results   Component Value Date    SEDRATE 7 10/17/2017         Lab Results   Component Value Date/Time    DDIMER 0.71 07/25/2020 11:20 AM     Lab Results   Component Value Date/Time    FERRITIN 13 03/01/2022 10:07 PM    FERRITIN 155 07/25/2020 11:20 AM     Lab Results   Component Value Date/Time     03/10/2022 11:50 AM     07/25/2020 11:20 AM     Lab Results   Component Value Date/Time    FIBRINOGEN 610 07/25/2020 11:20 AM       Results in Past 30 Days  Result Component Current Result Ref Range Previous Result Ref Range   SARS-CoV-2, Rapid Not Detected (6/27/2022) Not Detected Not Detected (6/12/2022) Not Detected     Lab Results   Component Value Date/Time    COVID19 Not Detected 06/27/2022 01:44 PM    COVID19 Not Detected 06/12/2022 06:34 PM    COVID19 Not Detected 03/01/2022 10:34 PM    COVID19 Not Detected 02/06/2022 09:46 AM    COVID19 Not Detected 07/25/2020 12:17 PM       No results for input(s): VANCOTROUGH in the last 72 hours. Imaging Studies:   ONE XRAY VIEW OF THE CHEST 7/3/2022 8:03 am  Impression   Mild increased perihilar edema, mild increased perihilar opacification,   likely edema and CHF.  Persistent small effusions and bibasilar atelectasis.       Satisfactory position of endotracheal tube.           Cultures:     Culture, Blood 1 [[id number]] Collected: 06/27/22 1311   Order Status: Completed Specimen: Blood Updated: 07/02/22 1331    Specimen Description . BLOOD    Special Requests NOT GIVEN    Culture NO GROWTH 5 DAYS   Culture, Blood 1 [[id number]] Collected: 06/27/22 1245   Order Status: Completed Specimen: Blood Updated: 07/02/22 1327    Specimen Description . BLOOD    Special Requests L AC 1ML    Culture NO GROWTH 5 DAYS   Culture, Urine [[id number]] Collected: 06/27/22 2007   Order Status: Completed Specimen: Urine Updated: 06/28/22 2106    Specimen Description . URINE    Culture NO GROWTH   MRSA DNA Probe, Nasal [[id number]] Collected: 06/27/22 1742   Order Status: Completed Specimen: Nasal Updated: 06/28/22 1041    Specimen Description . NASAL SWAB    MRSA, DNA, Nasal NEGATIVE    Comment: NEGATIVE:  MRSA DNA not detected by nucleic acid amplification.                                                     Results should be used as an adjunct to nosocomial control efforts to identify patients   needing enhanced precautions.     The test is not intended to identify patients with staphylococcal infections.  Results   should not be used to guide or monitor treatment for MRSA infections. MRSA DNA Probe, Nasal [[id number]]    Order Status: Canceled Specimen: Nares    COVID-19, Rapid [[id number]] Collected: 06/27/22 1348   Order Status: Completed Specimen: Nasopharyngeal Swab Updated: 06/27/22 1411    Specimen Description . NASOPHARYNGEAL SWAB    SARS-CoV-2, Rapid Not Detected    Comment:        Rapid NAAT:  The specimen is NEGATIVE for SARS-CoV-2, the novel coronavirus associated with   COVID-19.         The ID NOW COVID-19 assay is designed to detect the virus that causes COVID-19 in patients   with signs and symptoms of infection who are suspected of COVID-19. An individual without symptoms of COVID-19 and who is not shedding SARS-CoV-2 virus would   expect to have a negative (not detected) result in this assay. Negative results should be treated as presumptive and, if inconsistent with clinical signs   and symptoms or necessary for patient management,   should be tested with an alternative molecular assay.  Negative results do not preclude   SARS-CoV-2 infection and   should not be used as the sole basis for patient management decisions.         Fact sheet for Healthcare Providers: Lio.es   Fact sheet for Patients: Lio.es           Methodology: Isothermal Nucleic Acid Amplification         Medications:      epoetin rick-epbx  5,000 Units IntraVENous Q MWF    docusate  100 mg Oral Daily    famotidine (PEPCID) injection  10 mg IntraVENous Daily    levothyroxine  25 mcg Oral Daily    lidocaine 1 % injection  5 mL IntraDERmal Once    sodium chloride flush  5-40 mL IntraVENous 2 times per day    midodrine  5 mg Per NG tube TID WC    sodium polystyrene  15 g Oral Once    amiodarone  100 mg Oral Daily    rocuronium  0.6 mg/kg IntraVENous Once  albuterol  2.5 mg Nebulization Q6H    sodium chloride flush  5-40 mL IntraVENous 2 times per day    aspirin EC  81 mg Oral Daily    atorvastatin  40 mg Oral Daily    magnesium oxide  400 mg Oral BID    miconazole   Topical BID    ferrous sulfate  325 mg Oral Daily with breakfast           [facility]  Neena Wallace MD  [employer]  OFFICE: [phone]      Electronically signed by Neena Wallace MD on 7/7/2022 at 9:19 AM  Thank you for allowing us to participate in the care of this patient. Please call with questions. This note iscreated with the assistance of a speech recognition program.  While intending to generate a document that actually reflects the content of the visit, the document can still have some errors including those of syntax andsound a like substitutions which may escape proof reading. In such instances, actual meaning can be extrapolated by contextual diversion.

## 2022-07-07 NOTE — CARE COORDINATION
SW following for HD needs. Referral to [facility] MWF for OP HD. Patient creatinine improving, await Nephrology recommendations on continuing HD. Provided update to Scarlet at [facility] Admissions.

## 2022-07-07 NOTE — PROGRESS NOTES
Physical Therapy  Facility/Department: [facility] CAR 3  Physical Therapy Initial Assessment    Name: Yvonne He  :   MRN: [id number]  Date of Service: 2022    Chief Complaint   Patient presents with    Bradycardia       Discharge Recommendations:    Further therapy recommended at discharge. The patient should be able to tolerate at least 3 hours of therapy per day over 5 days or 15 hours over 7 days. This patient may benefit from a Physical Medicine and Rehab consult. PT Equipment Recommendations  Other: Pt unsafe to amb any distance without skilled assistance      Patient Diagnosis(es): The encounter diagnosis was Bradycardia.   Past Medical History:  has a past medical history of Acute CVA (cerebrovascular accident) ([address] Acute kidney injury superimposed on chronic kidney disease ([address] Acute on chronic combined systolic (congestive) and diastolic (congestive) heart failure ([address] JOY (acute kidney injury) ([address] Altered mental status, Anticoagulated, Arthritis, Asthma, Bradycardia, Brain mass, Cellulitis and abscess, Cellulitis and abscess of unspecified site, Cellulitis of both lower extremities, Cellulitis of left lower extremity, Cellulitis of lower extremity, CHF (congestive heart failure) ([address] Chronic kidney disease, unspecified, CKD stage 4 secondary to hypertension ([address] Coronary atherosclerosis of native coronary artery, Elevated LFTs, Essential hypertension, Essential hypertension, malignant, Hallucinations, Hard of hearing, Head contusion, Hypertensive emergency, Hypertensive urgency with joy, Hypokalemia, Hypomagnesemia, Iron deficiency anemia, unspecified, Meningioma ([address] Myocardial infarction ([address] Other and unspecified hyperlipidemia, Pure hypercholesterolemia, Stage 4 chronic kidney disease ([address] Toxic metabolic encephalopathy, Type 2 diabetes mellitus with stage 4 chronic kidney disease, without long-term current use of insulin ([address] Type II or unspecified type diabetes mellitus without mention of complication, not stated as uncontrolled, Unspecified asthma(493.90), Unspecified venous (peripheral) insufficiency, Unspecified vitamin D deficiency, and UTI (urinary tract infection). Past Surgical History:  has a past surgical history that includes Tonsillectomy and adenoidectomy; Coronary artery bypass graft; intubation (3/7/2022); Thoracentesis (3/10/2022); Upper gastrointestinal endoscopy (N/A, 3/15/2022); Colonoscopy (N/A, 3/15/2022); and IR NONTUNNELED VASCULAR CATHETER > 5 YEARS (6/28/2022). Assessment   Body Structures, Functions, Activity Limitations Requiring Skilled Therapeutic Intervention: Decreased functional mobility ; Decreased strength;Decreased endurance;Decreased balance  Assessment: Pt grossly modA for mobility, modA to EOB and standing face to face pivot steps to chair with cueing to sequence. Pt would benefit from continued acute PT to address deficits. Therapy Prognosis: Good  Decision Making: High Complexity  Requires PT Follow-Up: Yes  Activity Tolerance  Activity Tolerance: Patient limited by fatigue;Patient limited by endurance  Activity Tolerance Comments: c/o fear of falling, pt reassured throughout.      Plan   Plan  Plan:  (6x/wk)  Current Treatment Recommendations: Strengthening,Balance training,Functional mobility training,Transfer training,Endurance training,Gait training,Stair training,Home exercise program,Safety education & training,Patient/Caregiver education & training,Equipment evaluation, education, & procurement  Safety Devices  Type of Devices: Call light within reach,Nurse notified,Gait belt,Patient at risk for falls,Left in chair,All fall risk precautions in place,Chair alarm in place  Restraints  Restraints Initially in Place: No     Restrictions  Restrictions/Precautions  Restrictions/Precautions: Fall Risk,Up as Tolerated  Required Braces or Orthoses?: No  Position Activity Restriction  Other position/activity restrictions: 6/28 emergent intubation and non-tunnelled HD catheter. extubated 7/6 at 1813. Subjective   General  Chart Reviewed: Yes  Patient assessed for rehabilitation services?: Yes  Response To Previous Treatment: Not applicable  Family / Caregiver Present: Yes (dtr)  Follows Commands: Within Functional Limits  General Comment  Comments: RN and pt agreeable to PT. Pt alert in bed upon arrival.  Subjective  Subjective: Pt denies any pain or any numbness or tingling. Social/Functional History  Social/Functional History  Lives With: Son (variable schedule, dtr could also help when he works)  Type of Home: House  Home Layout: Two level,Able to Live on Main level with bedroom/bathroom,1/2 bath on main level (no full bath main floor, bed baths. not up steps to bath in ~6 months d/t hospitilizations)  Home Access: Stairs to enter with rails  Entrance Stairs - Number of Steps: 3  Entrance Stairs - Rails: Right  Bathroom Toilet: Standard  Bathroom Equipment: Grab bars around toilet,Toilet raiser  Bathroom Accessibility: Accessible  Home Equipment: Cane,Walker, rolling,Wheelchair-manual,Lift chair  Receives Help From: [employer]  ADL Assistance: Needs assistance (bath, dress with assist since february, prior independent)  Toileting: Needs assistance (since ~ february)  Homemaking Assistance: Needs assistance (more since [place], prior pt was doing all)  Homemaking Responsibilities: Yes  Ambulation Assistance: Independent (~february RW used for longer distance, no AD otherwise)  Transfer Assistance: Independent  Active : No  Patient's  Info: son or dtr  Education: son's in truck which was harder for pt, required a step stool. Easier to get in dtr's car, even prior to [facility]: TV, music  Additional Comments: Social/ PLOF provided by dtr  Vision/Hearing  Vision  Vision: Impaired  Vision Exceptions:  (R eye blind with cataracts, Sx put off d/t other medical conditions.  has glasses for other eye but broken. needs new ones)  Hearing  Hearing: Exceptions to [facility]  Hearing Exceptions: Hard of hearing/hearing concerns (prescribed for B ears, but did not obtain.)    Cognition   Orientation  Overall Orientation Status:  (hard of hearing limiting assessment, seemed appropriate with responses.)  Cognition  Cognition Comment: increased time,     Objective     AROM RLE (degrees)  RLE AROM: WNL  AROM LLE (degrees)  LLE AROM : WNL  AROM RUE (degrees)  RUE General AROM: limited overhead shld flexion AAROM  AROM LUE (degrees)  LUE General AROM: limited overhead shld flexion AAROM  Strength RLE  Comment: grossly 3-/5  Strength LLE  Comment: grossly 3-/5  Strength RUE  Comment: grossly 3-/5  Strength LUE  Comment: grossly 3-/5           Bed mobility  Supine to Sit: Moderate assistance  Sit to Supine:  (left in chair)  Scooting: Moderate assistance  Transfers  Sit to Stand: Moderate Assistance  Stand to sit: Moderate Assistance  Comment: face to face. performed 2 times. Ambulation  Quality of Gait: step pivot to chair modA, face to face, cues throughout to sequence steps. Balance  Posture: Poor (very kyphotic)  Sitting - Static: Fair;+  Sitting - Dynamic: Fair  Standing - Static: Poor;+  Standing - Dynamic: Poor  Comments: face to face assist used while assessing standing balance. Exercise Treatment: incresaed time for all mobility, CGA to Mehdi EOB 15min. 10x Bilat: ankle pumps, quad sets, glute sets. edu on importance of mobility, encouragement as pt expressed fear of falling.         AM-PAC Score     AM-PAC Inpatient Mobility without Stair Climbing Raw Score : 9 (07/07/22 1217)  AM-PAC Inpatient without Stair Climbing T-Scale Score : 32.44 (07/07/22 1217)  Mobility Inpatient CMS 0-100% Score: 76.07 (07/07/22 1217)  Mobility Inpatient without Stair CMS G-Code Modifier : CL (07/07/22 1217)       Goals  Short Term Goals  Time Frame for Short term goals: 14 visits  Short term goal 1: Pt will be CGA mobility  Short term goal 2: Pt will be CGA transfers  Short term goal 3: Pt will be MehdiSAHARA mancera 50' RW  Short term goal 4: Pt will navigate 4 steps Mehdi TANNER rail       Education  Patient Education  Education Given To: Patient; Family  Education Provided: Role of Therapy;Plan of Care  Education Method: Demonstration  Barriers to Learning: Hearing;Vision (fear of falling.)  Education Outcome: Verbalized understanding;Demonstrated understanding      Therapy Time   Individual Concurrent Group Co-treatment   Time In 1022         Time Out 1107         Minutes 45         Timed Code Treatment Minutes: 45 Minutes       Adilene Limon, PT

## 2022-07-07 NOTE — PROGRESS NOTES
Critical Care Team - Daily Progress Note      Date and time: 7/7/2022 7:28 AM  Patient's name:  Reji Shearer  Medical Record Number: [id number]  Patient's account/billing number: [de-identified]  Patient's YOB: 1950  Age: 66 y.o. Date of Admission: 6/27/2022 12:28 PM  Length of stay during current admission: 10      Primary Care Physician: Juvenal Thompson MD  ICU Attending Physician: Dr. Harman Render Status: Full Code    Reason for ICU admission:   Chief Complaint   Patient presents with    Bradycardia       Subjective:     OVERNIGHT EVENTS:  No significant events overnight    Today:  · S/p extubation 7/6/2022  · Overnight no acute event . No  signs of bleed , hemoglobin trended upward from 7>8.8>8. 1. will restart heparin   · Hemodynamically stable, afebrile overnight ,saturating on 2 l nc,off pressors (was on norepi and dopamine at one point)  · Did not take her oral meds . will try bedside swallow and assess   · Alert oriented x3 .doesnt know where s\he is but following command. can carry a normal conversation . Subtracted 7 from 100 .said 90 something dollars   · 4th session of dialysis 7/7/2022  ,2 l removed . new dialysis with progression of diabetic nephropathy   · Blood cultures and urine cultures are negative- cefepime monitored off   · na 136>134>134>130>133  · wbc 6.8>7.5>7.1>7.8>8.1  · hgb stable 8.1>8.2>8.3>7.8>8.1 >7.5>7>7>8.8>8.1  · nephro on board        Plan :  · Heparin restarted  . Hemoglobin stable   · Hemodynamically stable. amio resumed . Can resume betablocker if bp/HR allows   · Transfer out            Patient was found to have a heart rate as low as 35 and was started on transcutaneous pacing and route and was given a total of 2 mg atropine. Patient was most recently discharged from [facility] this month for similar episodes of bradycardia. Cardiology evaluated during that admission and attributed the bradycardia to medication and adjusted her metoprolol. Additionally she was discharged with a Holter monitor with results suggestive of normal sinus with some PACs. Patient is on Eliquis at home for atrial fibrillation and most recent echo was done in 2022 with an EF of 55%. At home on elaquis , metoprolol,amio 200, amlodipine ,hydralazine   AWAKE & FOLLOWING COMMANDS:  [x] No (, opens eyes to verbal commands) [] Yes    CURRENT VENTILATION STATUS:     [x] Ventilator  [] BIPAP  [] Nasal Cannula [] Room Air          SECRETIONS Amount:  [] Small [] Moderate  [] Large  [x] None  Color:     [] White [] Colored  [] Bloody    SEDATION:  RAAS Score:  [] Propofol gtt  [] Versed gtt  [] Ativan gtt   [] No Sedation PRECEDEX    PARALYZED:  [x] No    [] Yes    DIARRHEA:                [x] No                [] Yes  (C. Difficile status: [] positive                                                                                                                       [] negative                                                                                                                     [] pending)    VASOPRESSORS:  [x] No    [] Yes    If yes -    [] Levophed       [] Dopamine     [] Vasopressin       [] Dobutamine  [] Phenylephrine         [] Epinephrine    CENTRAL LINES:     [] No   [x] Yes   (Date of Insertion:   )           If yes -     [x] Right IJ     [] Left IJ [] Right Femoral [] Left Femoral                   [] Right Subclavian [] Left Subclavian       GUTIERREZ'S CATHETER:   [] No   [x] Yes  (Date of Insertion:   )     URINE OUTPUT:            [] Good   [] Low              [x] Anuric    REVIEW OF SYSTEMS:  Unable to obtain due to patient mentation.      OBJECTIVE:     VITAL SIGNS:  BP (!) 126/44   Pulse 72   Temp 97.2 °F (36.2 °C) (Axillary)   Resp 17   Ht 4' 11\" (1.499 m)   Wt 148 lb 13 oz (67.5 kg)   SpO2 98%   BMI 30.06 kg/m²   Tmax over 24 hours:  Temp (24hrs), Av.9 °F (36.6 °C), Min:96.8 °F (36 °C), Max:98.8 °F (37.1 °C)      Patient Vitals for the past 8 hrs:   BP Temp Temp src Pulse Resp SpO2 Weight   07/07/22 0700 (!) 126/44 97.2 °F (36.2 °C) Axillary 72 17 98 % --   07/07/22 0600 (!) 139/49 -- -- 68 14 97 % 148 lb 13 oz (67.5 kg)   07/07/22 0500 133/82 -- -- 82 20 98 % --   07/07/22 0400 (!) 130/44 97 °F (36.1 °C) Axillary 71 14 100 % --   07/07/22 0300 (!) 147/48 -- -- 86 17 96 % --   07/07/22 0100 (!) 155/53 -- -- 84 18 100 % --   07/07/22 0000 (!) 154/50 96.8 °F (36 °C) Axillary 84 17 99 % --         Intake/Output Summary (Last 24 hours) at 7/7/2022 0728  Last data filed at 7/7/2022 0600  Gross per 24 hour   Intake 3029.37 ml   Output 3200 ml   Net -170.63 ml     Date 07/07/22 0000 - 07/07/22 2359   Shift 0838-0304 3657-2244 0134-6627 24 Hour Total   INTAKE   I.V.(mL/kg) 925.2(13.7)   925.2(13.7)   Shift Total(mL/kg) 925.2(13.7)   925.2(13.7)   OUTPUT   Urine(mL/kg/hr) 400   400   Shift Total(mL/kg) 400(5.9)   400(5.9)   Weight (kg) 67.5 67.5 67.5 67.5     Wt Readings from Last 3 Encounters:   07/07/22 148 lb 13 oz (67.5 kg)   06/22/22 152 lb (68.9 kg)   06/21/22 152 lb (68.9 kg)     Body mass index is 30.06 kg/m². PHYSICAL EXAM:  Constitutional:extubated  , following command,carrying conversation   HEENT: PERRLA, EOMI, sclera clear, anicteric  Respiratory: Bilateral mechanical breath sounds  Cardiovascular: regular rate and rhythm, normal S1, S2, no murmur apprecuated on physical exam  Abdomen: soft, nontender, nondistended, no masses or organomegaly  NEUROLOGIC: following command   Cranial nerves II-XII are grossly intact. Extremities:  peripheral pulses normal, 2+ pedal edema,.     MEDICATIONS:  Scheduled Meds:   epoetin rick-epbx  5,000 Units IntraVENous Q MWF    docusate  100 mg Oral Daily    famotidine (PEPCID) injection  10 mg IntraVENous Daily    levothyroxine  25 mcg Oral Daily    lidocaine 1 % injection  5 mL IntraDERmal Once    sodium chloride flush  5-40 mL IntraVENous 2 times per day    midodrine  5 mg Per NG tube TID WC  sodium polystyrene  15 g Oral Once    amiodarone  100 mg Oral Daily    rocuronium  0.6 mg/kg IntraVENous Once    albuterol  2.5 mg Nebulization Q6H    sodium chloride flush  5-40 mL IntraVENous 2 times per day    aspirin EC  81 mg Oral Daily    atorvastatin  40 mg Oral Daily    magnesium oxide  400 mg Oral BID    miconazole   Topical BID    ferrous sulfate  325 mg Oral Daily with breakfast     Continuous Infusions:   sodium chloride      sodium chloride 75 mL/hr at 07/07/22 0600    sodium chloride      dexmedetomidine 0.9 mcg/kg/hr (07/07/22 0708)    sodium chloride      dextrose      sodium chloride 75 mL/hr at 07/06/22 2000    heparin (PORCINE) Infusion Stopped (07/05/22 0454)     PRN Meds:   sodium chloride, , PRN  albumin human, 25 g, PRN  sodium chloride flush, 5-40 mL, PRN  sodium chloride, 25 mL, PRN  sodium chloride, , PRN  fentanNYL, 50 mcg, Q2H PRN  heparin (porcine), 1,300 Units, PRN  heparin (porcine), 1,200 Units, PRN  glucose, 4 tablet, PRN  dextrose bolus, 125 mL, PRN   Or  dextrose bolus, 250 mL, PRN  glucagon (rDNA), 1 mg, PRN  dextrose, 100 mL/hr, PRN  sodium chloride flush, 5-40 mL, PRN  sodium chloride, , PRN  ondansetron, 4 mg, Q8H PRN   Or  ondansetron, 4 mg, Q6H PRN  polyethylene glycol, 17 g, Daily PRN  acetaminophen, 650 mg, Q6H PRN   Or  acetaminophen, 650 mg, Q6H PRN  heparin (porcine), 4,000 Units, PRN  heparin (porcine), 2,000 Units, PRN  morphine, 2 mg, Q4H PRN        SUPPORT DEVICES: [x] Ventilator [] BIPAP  [] Nasal Cannula [] Room Air    VENT SETTINGS (Comprehensive) (if applicable):  Vent Information  Ventilator ID: [id number]  Vent Mode: AC/PRVC  Ventilator Discontinue: (S) Yes  Additional Respiratory Assessments  Heart Rate: 72  Resp: 17  SpO2: 98 %  End Tidal CO2: 39 (%)  Position: Semi-Leung's  Humidification Source: HME  Cuff Pressure (cm H2O):  (mov)    ABGs:     Lab Results   Component Value Date/Time    PHART 7.459 03/09/2022 04:28 AM    EJH8NQJ 39.9 03/09/2022 04:28 AM    PO2ART 107.0 03/09/2022 04:28 AM    VTS0WAC 28.3 03/09/2022 04:28 AM    R1HSJEUV 97.4 03/09/2022 04:28 AM    FIO2 30.0 07/06/2022 04:40 AM     Lactic Acid:   Lab Results   Component Value Date/Time    LACTA 0.7 03/04/2022 02:30 PM    LACTA 1.2 02/18/2021 01:00 AM    LACTA 1.8 07/25/2020 11:20 AM         DATA:  Complete Blood Count:   Recent Labs     07/05/22  0354 07/05/22  1057 07/06/22  0456 07/06/22  1757 07/07/22  0447   WBC 7.8  --  8.1  --  PENDING   HGB 7.0*   < > 7.0* 8.8* 8.1*   MCV 96.6  --  101.7  --  102.2   PLT See Reflexed IPF Result  --  See Reflexed IPF Result  --  127*   RBC 2.37*  --  2.33*  --  2.69*   HCT 22.9*   < > 23.7* 28.2* 27.5*   MCH 29.5  --  30.0  --  30.1   MCHC 30.6  --  29.5  --  29.5   RDW 16.4*  --  16.3*  --  17.3*   MPV  --   --   --   --  9.7    < > = values in this interval not displayed. PT/INR:    Lab Results   Component Value Date/Time    PROTIME 10.8 06/14/2022 02:56 AM    INR 1.0 06/14/2022 02:56 AM     PTT:    Lab Results   Component Value Date/Time    APTT 32.6 07/05/2022 03:54 AM       Basal Metabolic Profile:   Recent Labs     07/05/22 0354 07/06/22  0456 07/07/22 0447   * 133* 136   K 4.2 4.4 3.7   BUN 29* 44* 23   CREATININE 1.49* 2.02* 1.35*   CL 99 99 100   CO2 29 27 27      Magnesium:   Lab Results   Component Value Date/Time    MG 2.3 06/09/2022 06:00 AM    MG 2.4 06/07/2022 09:45 PM    MG 2.3 05/18/2022 02:19 PM     Phosphorus:   Lab Results   Component Value Date/Time    PHOS 2.2 07/02/2022 05:11 AM    PHOS 4.4 06/15/2022 08:07 AM    PHOS 4.6 05/18/2022 02:19 PM     S. Calcium:  Recent Labs     07/07/22  0447   CALCIUM 7.8*     S. Ionized Calcium:No results for input(s): IONCA in the last 72 hours.       Urinalysis:   Lab Results   Component Value Date/Time    NITRU NEGATIVE 06/27/2022 06:35 PM    COLORU Dark Yellow 06/27/2022 06:35 PM    PHUR 5.5 06/27/2022 06:35 PM    WBCUA 20 TO 50 06/27/2022 06:35 PM    RBCUA 20 TO 50 06/27/2022 06:35 PM    MUCUS 1+ 06/27/2022 06:35 PM    TRICHOMONAS NOT REPORTED 02/07/2022 02:42 AM    YEAST NOT REPORTED 02/07/2022 02:42 AM    BACTERIA FEW 06/12/2022 09:33 PM    SPECGRAV 1.030 06/27/2022 06:35 PM    LEUKOCYTESUR SMALL 06/27/2022 06:35 PM    UROBILINOGEN Normal 06/27/2022 06:35 PM    BILIRUBINUR NEGATIVE 06/27/2022 06:35 PM    GLUCOSEU 1+ 06/27/2022 06:35 PM    KETUA TRACE 06/27/2022 06:35 PM    AMORPHOUS NOT REPORTED 02/07/2022 02:42 AM       CARDIAC ENZYMES: No results for input(s): CKMB, CKMBINDEX, TROPONINI in the last 72 hours. Invalid input(s): CKTOTAL;3  BNP: No results for input(s): BNP in the last 72 hours. LFTS  No results for input(s): ALKPHOS, ALT, AST, BILITOT, BILIDIR, LABALBU in the last 72 hours. AMYLASE/LIPASE/AMMONIA  Recent Labs     07/04/22  1450   AMMONIA 20       Last 3 Blood Glucose:   Recent Labs     07/05/22  0354 07/06/22  0456 07/07/22  0447   GLUCOSE 146* 172* 111*      HgBA1c:    Lab Results   Component Value Date/Time    LABA1C 4.6 03/31/2022 11:21 AM         TSH:    Lab Results   Component Value Date/Time    TSH 9.54 06/27/2022 12:44 PM     ANEMIA STUDIES  No results for input(s): LABIRON, TIBC, FERRITIN, OYPEIBFU84, FOLATE, OCCULTBLD in the last 72 hours.       Cultures during this admission:     Blood cultures:                 [] None drawn      [x] Negative             []  Positive (Details:  )  Urine Culture:                   [] None drawn      [x] Negative             []  Positive (Details:pend  )  Sputum Culture:               [] None drawn       [] Negative             []  Positive (Details:  )   Endotracheal aspirate:     [] None drawn       [] Negative             []  Positive (Details:  )        ASSESSMENT:     Principal Problem:    Bradycardia  Active Problems:    Lymphedema    Acute kidney injury superimposed on CKD ([facility]    Hyperkalemia    Shock ([facility] [facility]    Leukocytosis    Acute respiratory failure with hypoxia and hypercapnia (HCC)    Pulmonary edema cardiac cause (HCC)    Dependence on renal dialysis ([facility]    Nephrotic range proteinuria    Arterial hypotension    Acute on chronic diastolic congestive heart failure (HCC)    JOY (acute kidney injury) ([facility] [address] [facility]  Resolved Problems:    * No resolved hospital problems. *  Acute kidney injury versus progression of underlying kidney disease, oliguric dependent on dialysis  Hyperkalemia-resolved        PLAN:   Neurologic:   · Neuro checks per protocol. · Worsening mentatation in last two weeks before coming in per daughter. baseline status otherwise normal.   · CT of head performed in ED. Suggestive of 2.6 cm anterior right temporal meningioma with mild increase in vasogenic edema  · Previous CT's reviewed. · On seroquil at night  · Neurology signed off  with no new recommendations  ·  Alert oriented x3 .doesnt know where she is but following command. can carry a normal conversation . Cardiovascular:RESOLVED         Sinus Bradycardia   ? Sinus bradycardia with hyperkalemia and hypothermia on admission  ? Patient is currently off of the dopamine gtt. and epinephrine gtt  ? amirodarone restarted 7/6/2022  ? Cardiology signed off   ? Continue to monitor       History of paroxysmal Atrial Fibrillation - CHADS VASC score of 6  · Patient is on Eliquis at home, hemodynamically stable, not in RVR. · Heparin is on hold due to low hb 7, no visible signs of bleeding, repeat hemoglobin was 7. Continue to hold Eliquis. · amio restarted 7/6/2022. Monitor hr and bp. .Chronic Diastolic Heart Failure  · Recent echo showed EF of 60 to 65%.       Pulmonary:  COPD   Patient was recently started on home oxygen 2 to 3 L. Scattered infiltrates with bilateral effusions . Off antibiotics         Extubated 7/6/2022 to nasal cannula        7/7/2022 :on nc  @ 2 (home dose)    GI/Nutrition  · Glycolax PRN  · Ulcer Prophylaxis: famotidine 10 mg iv daily  given mec vent>48h+on antiplatelet agent ([id number]).  Will change to oral today · Diet:On TF     Renal/Fluid/Electrolyte  JOY on CKD Stage 4  · Creatinine 2.13 on admission with baseline around 1.7  . 1.35  today  · Most likely pre-renal secondary to decreased perfusion due to pulmonary vascular congestion. · Patient got 4 session of HD  On 30th , 2nd , 4th 6th  ,NEPHRO on board . ID  · Scattered infiltrates with bilateral pleural effusions present on chest x-ray . Patient upon appropriate antibiotics, cefepime for hospital versus community-acquired pneumonia. We will continue to trend white count, manage fevers if  present. White blood cell count stable   · 7/3/2022 monitor off antibiotics         Hematology:      · Recent Labs     06/27/22  1244   HGB 10.5*   · Stable. Daily CBC.      Endocrine:   ? Hx of diabetes mellitus not on any home medications  ? Most recent HbA1c 4.6 from 3/31/2022  ? Will continue to monitor-BGs controlled <180  ? On TF  ? tsh 9.4 on synthyroid 25     DVT Prophylaxis  ?  Heparin  Restarted after hb stable         Avinash Kelley MD,        Resident internal medicine, PGY-3  [facility])             7/7/2022, 7:28 AM

## 2022-07-07 NOTE — PROGRESS NOTES
Critical care team - Resident sign-out to medicine service      Date and time: 7/7/2022 4:21 PM  Patient's name:  Carly Haro  Medical Record Number: [id number]  Patient's account/billing number: [de-identified]  Patient's YOB: 1950  Age: 66 y.o. Date of Admission: 6/27/2022 12:28 PM  Length of stay during current admission: 10    Primary Care Physician: Mickey Essex, MD    Code Status: Full Code    Mode of physician to physician communication:        [] Via telephone   [] In person     Date and time of sign-out: 7/7/2022 4:21 PM    Accepting physician: Dr. Adolph Lawrence    Accepting Medicine team: [facility]    Accepting team's attending: unknown at this time    Patient's current ICU Bed:  9689     Patient's assigned bed on floor:  2019-01        [x] Med-Surg Monitored [] Step-down       [] Psychiatry ICU       [] Psych floor     Reason for ICU admission:   bradycardia    ICU course summary:   Patient was found to have a heart rate as low as 35 and was started on transcutaneous pacing and route and was given a total of 2 mg atropine.  Patient was most recently discharged from Health system this month for similar episodes of bradycardia.  Cardiology evaluated during that admission and attributed the bradycardia to medication and adjusted her metoprolol.  Additionally she was discharged with a Holter monitor with results suggestive of normal sinus with some PACs.  Patient is on Eliquis at home for atrial fibrillation and most recent echo was done in February 2022 with an EF of 55%. During this icu admission patient was intubated for impending respiratory failure and was extubated on 7/6/2022. On 2 l nc at home which is her baseline     Patient betablocker and amio was stopped initially  Due to bradycardia and hypotension . Patient was on norepi and dopamine gtt . Over course of her icu stay patient was weaned off nor epi and dopamine. amio slowly reinstated at lower dose .  Beta blocker not resumed yet . Patient also developed JOY  On top of her chronic diabetic nephropathy . nephro on board . Got 4 session of dialysis. Will follow MWF . /social work working on dialysis placement               Procedures during patient's ICU stay:   n/a    Current Vitals:   BP (!) 148/38   Pulse 89   Temp 97.6 °F (36.4 °C) (Axillary)   Resp 21   Ht 4' 11\" (1.499 m)   Wt 148 lb 13 oz (67.5 kg)   SpO2 99%   BMI 30.06 kg/m²     Cultures:     Blood cultures:                 [] None drawn      [] Negative             []  Positive (Details:  )  Urine Culture:                   [] None drawn      [] Negative             []  Positive (Details:  )  Sputum Culture:               [] None drawn       [] Negative             []  Positive (Details:  )   Endotracheal aspirate:     [] None drawn       [] Negative             []  Positive (Details:  )       Consults:     1. Cardiology  2. Nephrology  3. ID    Assessment:     Patient Active Problem List    Diagnosis Date Noted    Dependence on renal dialysis ([address]     Nephrotic range proteinuria     Arterial hypotension     Hyperkalemia 06/28/2022    Shock ([address]     Leukocytosis     Acute respiratory failure with hypoxia and hypercapnia (HCC)     Pulmonary edema cardiac cause (HCC)     Bradycardia 06/27/2022    Chronic ulcer of left leg with fat layer exposed ([address] 06/21/2022    TSH elevation 06/15/2022    Obesity (BMI 30-39.9) 06/14/2022    Acute kidney injury superimposed on CKD ([address] 06/08/2022    Ulcer of right leg, with fat layer exposed ([address] 05/20/2022    Lymphedema 05/11/2022    Peripheral vascular disease, unspecified ([address] 05/11/2022    Bilateral lower extremity edema 04/20/2022    Leg ulcer, left, with fat layer exposed ([address] 04/20/2022    Decreased strength, endurance, and mobility 03/31/2022    Mild malnutrition ([address] 03/08/2022    Symptomatic anemia     Family history of colon cancer     Family history of pancreatic cancer  Chronic venous stasis dermatitis of both lower extremities 02/08/2022    JOY (acute kidney injury) [facility] 01/15/2022    COVID-19 vaccination declined 01/09/2022    Diverticulosis 06/29/2021    Kidney stones 05/26/2021    Recurrent UTI 02/18/2021    Influenza vaccination declined 10/04/2020    Paroxysmal atrial fibrillation ([address] 07/28/2020    Meningioma, cerebral ([address] 07/26/2020    Partial deafness of both ears 02/20/2020    Acute on chronic diastolic congestive heart failure ([address] 02/20/2020    COPD (chronic obstructive pulmonary disease) ([facility] 02/20/2020    CKD stage 4 secondary to hypertension ([facility] 02/20/2020    Gout with tophi 02/20/2020    Hyperlipidemia with target LDL less than 70 02/20/2020    Dry skin dermatitis HANDS 02/20/2020    Colonoscopy refused 06/08/2015    Pneumococcal vaccination declined 06/08/2015    Vitamin D deficiency     Venous insufficiency of both lower extremities     Type 2 diabetes mellitus with stage 4 chronic kidney disease, without long-term current use of insulin ([facility]     Coronary artery disease involving native coronary artery of native heart without angina pectoris     Iron deficiency anemia     Stage 4 chronic kidney disease ([address]            Recommended Follow-up:     1. Amio resumed . Home beta-blocker resumed   2. Resumed heparin . Monitor h and h and if low  Stop. If stable,can be switched to oral   3. Follow nephro recommendation  4. Follow up Cardiology recommendations re: bradycardia        Above mentioned assessment and plan was discussed by me with the admitting medicine resident. The medicine team assigned to the patient by medicine admitting resident will be following up the patient from now onwards on the floor.      Artelia Halsted, MD  Internal Medicine Resident, PGY2  [address]  7/7/2022,4:30 PM

## 2022-07-07 NOTE — PLAN OF CARE
Problem: Nutrition Deficit:  Goal: Optimize nutritional status  7/7/2022 0856 by Urbano Lindsey RN  Outcome: Not Progressing     Problem: Discharge Planning  Goal: Discharge to home or other facility with appropriate resources  7/7/2022 0856 by Urbano Lindsey RN  Outcome: Progressing  Flowsheets  Taken 7/7/2022 0800 by Urbano Lindsey RN  Discharge to home or other facility with appropriate resources: Identify barriers to discharge with patient and caregiver  Taken 7/6/2022 2000 by Char Caballero RN  Discharge to home or other facility with appropriate resources: Identify barriers to discharge with patient and caregiver  7/6/2022 1932 by Char Caballero RN  Outcome: Progressing     Problem: Chronic Conditions and Co-morbidities  Goal: Patient's chronic conditions and co-morbidity symptoms are monitored and maintained or improved  7/7/2022 0856 by Urbano Lindsey RN  Outcome: Progressing  Flowsheets  Taken 7/7/2022 0800 by Urbano Lindsey RN  Care Plan - Patient's Chronic Conditions and Co-Morbidity Symptoms are Monitored and Maintained or Improved:   Monitor and assess patient's chronic conditions and comorbid symptoms for stability, deterioration, or improvement   Update acute care plan with appropriate goals if chronic or comorbid symptoms are exacerbated and prevent overall improvement and discharge  Taken 7/6/2022 2000 by Augusto Sp[address] - Patient's Chronic Conditions and Co-Morbidity Symptoms are Monitored and Maintained or Improved: Monitor and assess patient's chronic conditions and comorbid symptoms for stability, deterioration, or improvement  7/6/2022 1932 by Char Caballero RN  Outcome: Progressing     Problem: Skin/Tissue Integrity  Goal: Absence of new skin breakdown  Description: 1. Monitor for areas of redness and/or skin breakdown  2. Assess vascular access sites hourly  3. Every 4-6 hours minimum:  Change oxygen saturation probe site  4.   Every 4-6 hours:  If on nasal continuous positive airway pressure, respiratory therapy assess nares and determine need for appliance change or resting period.   7/7/2022 0856 by Sedrick Jimenez RN  Outcome: Progressing  7/6/2022 1932 by Jacek Denny RN  Outcome: Progressing     Problem: Safety - Adult  Goal: Free from fall injury  7/7/2022 0856 by Sedrick Jimenez RN  Outcome: Progressing  7/6/2022 1932 by Jacek Denny RN  Outcome: Progressing  Flowsheets (Taken 7/6/2022 1931)  Free From Fall Injury: Instruct family/caregiver on patient safety     Problem: ABCDS Injury Assessment  Goal: Absence of physical injury  7/7/2022 0856 by Sedrick Jimenez RN  Outcome: Progressing  7/6/2022 1932 by Jacek Denny RN  Outcome: Progressing  Flowsheets  Taken 7/6/2022 1931 by Jacek Denny RN  Absence of Physical Injury: Implement safety measures based on patient assessment  Taken 7/6/2022 0859 by Sandy Hooper RN  Absence of Physical Injury: Implement safety measures based on patient assessment     Problem: Respiratory - Adult  Goal: Achieves optimal ventilation and oxygenation  7/7/2022 0856 by Sedrick Jimenez RN  Outcome: Progressing  Flowsheets (Taken 7/7/2022 0800)  Achieves optimal ventilation and oxygenation:   Assess for changes in respiratory status   Assess for changes in mentation and behavior   Position to facilitate oxygenation and minimize respiratory effort  7/6/2022 2125 by Vijaya Miner RCP  Outcome: Progressing  Flowsheets  Taken 7/6/2022 2125 by Archana Miner RCP  Achieves optimal ventilation and oxygenation:   Assess for changes in respiratory status   Assess the need for suctioning and aspirate as needed   Respiratory therapy support as indicated   Assess for changes in mentation and behavior   Oxygen supplementation based on oxygen saturation or arterial blood gases   Encourage broncho-pulmonary hygiene including cough, deep breathe, incentive spirometry   Assess and instruct to report shortness of breath or any respiratory difficulty  Taken

## 2022-07-08 ENCOUNTER — APPOINTMENT (OUTPATIENT)
Dept: INTERVENTIONAL RADIOLOGY/VASCULAR | Age: 72
DRG: 308 | End: 2022-07-08
Payer: OTHER GOVERNMENT

## 2022-07-08 LAB
ABSOLUTE EOS #: 0.05 K/UL (ref 0–0.44)
ABSOLUTE IMMATURE GRANULOCYTE: 0.47 K/UL (ref 0–0.3)
ABSOLUTE LYMPH #: 0.79 K/UL (ref 1.1–3.7)
ABSOLUTE MONO #: 1.33 K/UL (ref 0.1–1.2)
ANION GAP SERPL CALCULATED.3IONS-SCNC: 6 MMOL/L (ref 9–17)
BASOPHILS # BLD: 1 % (ref 0–2)
BASOPHILS ABSOLUTE: 0.07 K/UL (ref 0–0.2)
BUN BLDV-MCNC: 31 MG/DL (ref 8–23)
CALCIUM SERPL-MCNC: 8.1 MG/DL (ref 8.6–10.4)
CHLORIDE BLD-SCNC: 98 MMOL/L (ref 98–107)
CO2: 27 MMOL/L (ref 20–31)
CREAT SERPL-MCNC: 1.97 MG/DL (ref 0.5–0.9)
EOSINOPHILS RELATIVE PERCENT: 1 % (ref 1–4)
GFR AFRICAN AMERICAN: 30 ML/MIN
GFR NON-AFRICAN AMERICAN: 25 ML/MIN
GFR SERPL CREATININE-BSD FRML MDRD: ABNORMAL ML/MIN/{1.73_M2}
GLUCOSE BLD-MCNC: 133 MG/DL (ref 65–105)
GLUCOSE BLD-MCNC: 133 MG/DL (ref 65–105)
GLUCOSE BLD-MCNC: 137 MG/DL (ref 65–105)
GLUCOSE BLD-MCNC: 150 MG/DL (ref 70–99)
HCT VFR BLD CALC: 27.5 % (ref 36.3–47.1)
HCT VFR BLD CALC: 27.8 % (ref 36.3–47.1)
HEMOGLOBIN: 8.2 G/DL (ref 11.9–15.1)
HEMOGLOBIN: 8.4 G/DL (ref 11.9–15.1)
IMMATURE GRANULOCYTES: 5 %
INR BLD: 1
LYMPHOCYTES # BLD: 8 % (ref 24–43)
MCH RBC QN AUTO: 30.2 PG (ref 25.2–33.5)
MCHC RBC AUTO-ENTMCNC: 30.2 G/DL (ref 28.4–34.8)
MCV RBC AUTO: 100 FL (ref 82.6–102.9)
MONOCYTES # BLD: 13 % (ref 3–12)
NRBC AUTOMATED: 0.2 PER 100 WBC
PARTIAL THROMBOPLASTIN TIME: 33.8 SEC (ref 20.5–30.5)
PARTIAL THROMBOPLASTIN TIME: 43.6 SEC (ref 20.5–30.5)
PARTIAL THROMBOPLASTIN TIME: 57.2 SEC (ref 20.5–30.5)
PARTIAL THROMBOPLASTIN TIME: 65.5 SEC (ref 20.5–30.5)
PDW BLD-RTO: 17.2 % (ref 11.8–14.4)
PLATELET # BLD: 173 K/UL (ref 138–453)
PMV BLD AUTO: 9.8 FL (ref 8.1–13.5)
POTASSIUM SERPL-SCNC: 3.9 MMOL/L (ref 3.7–5.3)
PROTHROMBIN TIME: 10.9 SEC (ref 9.1–12.3)
RBC # BLD: 2.78 M/UL (ref 3.95–5.11)
RBC # BLD: ABNORMAL 10*6/UL
SEG NEUTROPHILS: 73 % (ref 36–65)
SEGMENTED NEUTROPHILS ABSOLUTE COUNT: 7.48 K/UL (ref 1.5–8.1)
SODIUM BLD-SCNC: 131 MMOL/L (ref 135–144)
WBC # BLD: 10.2 K/UL (ref 3.5–11.3)

## 2022-07-08 PROCEDURE — 6360000002 HC RX W HCPCS: Performed by: INTERNAL MEDICINE

## 2022-07-08 PROCEDURE — 2500000003 HC RX 250 WO HCPCS: Performed by: RADIOLOGY

## 2022-07-08 PROCEDURE — C1750 CATH, HEMODIALYSIS,LONG-TERM: HCPCS

## 2022-07-08 PROCEDURE — 0JH63XZ INSERTION OF TUNNELED VASCULAR ACCESS DEVICE INTO CHEST SUBCUTANEOUS TISSUE AND FASCIA, PERCUTANEOUS APPROACH: ICD-10-PCS | Performed by: INTERNAL MEDICINE

## 2022-07-08 PROCEDURE — 6360000002 HC RX W HCPCS: Performed by: STUDENT IN AN ORGANIZED HEALTH CARE EDUCATION/TRAINING PROGRAM

## 2022-07-08 PROCEDURE — 99232 SBSQ HOSP IP/OBS MODERATE 35: CPT | Performed by: INTERNAL MEDICINE

## 2022-07-08 PROCEDURE — C1769 GUIDE WIRE: HCPCS

## 2022-07-08 PROCEDURE — 2500000003 HC RX 250 WO HCPCS: Performed by: STUDENT IN AN ORGANIZED HEALTH CARE EDUCATION/TRAINING PROGRAM

## 2022-07-08 PROCEDURE — 94761 N-INVAS EAR/PLS OXIMETRY MLT: CPT

## 2022-07-08 PROCEDURE — 2580000003 HC RX 258: Performed by: STUDENT IN AN ORGANIZED HEALTH CARE EDUCATION/TRAINING PROGRAM

## 2022-07-08 PROCEDURE — 99222 1ST HOSP IP/OBS MODERATE 55: CPT | Performed by: INTERNAL MEDICINE

## 2022-07-08 PROCEDURE — 2500000003 HC RX 250 WO HCPCS: Performed by: NURSE PRACTITIONER

## 2022-07-08 PROCEDURE — 6370000000 HC RX 637 (ALT 250 FOR IP): Performed by: STUDENT IN AN ORGANIZED HEALTH CARE EDUCATION/TRAINING PROGRAM

## 2022-07-08 PROCEDURE — 85025 COMPLETE CBC W/AUTO DIFF WBC: CPT

## 2022-07-08 PROCEDURE — 77001 FLUOROGUIDE FOR VEIN DEVICE: CPT

## 2022-07-08 PROCEDURE — 90935 HEMODIALYSIS ONE EVALUATION: CPT

## 2022-07-08 PROCEDURE — 6360000002 HC RX W HCPCS: Performed by: PHYSICIAN ASSISTANT

## 2022-07-08 PROCEDURE — 36558 INSERT TUNNELED CV CATH: CPT

## 2022-07-08 PROCEDURE — 02H633Z INSERTION OF INFUSION DEVICE INTO RIGHT ATRIUM, PERCUTANEOUS APPROACH: ICD-10-PCS | Performed by: INTERNAL MEDICINE

## 2022-07-08 PROCEDURE — 85610 PROTHROMBIN TIME: CPT

## 2022-07-08 PROCEDURE — 94640 AIRWAY INHALATION TREATMENT: CPT

## 2022-07-08 PROCEDURE — 36415 COLL VENOUS BLD VENIPUNCTURE: CPT

## 2022-07-08 PROCEDURE — 85730 THROMBOPLASTIN TIME PARTIAL: CPT

## 2022-07-08 PROCEDURE — 85018 HEMOGLOBIN: CPT

## 2022-07-08 PROCEDURE — 90935 HEMODIALYSIS ONE EVALUATION: CPT | Performed by: INTERNAL MEDICINE

## 2022-07-08 PROCEDURE — 6370000000 HC RX 637 (ALT 250 FOR IP): Performed by: INTERNAL MEDICINE

## 2022-07-08 PROCEDURE — C1894 INTRO/SHEATH, NON-LASER: HCPCS

## 2022-07-08 PROCEDURE — 85014 HEMATOCRIT: CPT

## 2022-07-08 PROCEDURE — 2060000000 HC ICU INTERMEDIATE R&B

## 2022-07-08 PROCEDURE — 82947 ASSAY GLUCOSE BLOOD QUANT: CPT

## 2022-07-08 PROCEDURE — 02PA33Z REMOVAL OF INFUSION DEVICE FROM HEART, PERCUTANEOUS APPROACH: ICD-10-PCS | Performed by: INTERNAL MEDICINE

## 2022-07-08 PROCEDURE — 2700000000 HC OXYGEN THERAPY PER DAY

## 2022-07-08 PROCEDURE — 80048 BASIC METABOLIC PNL TOTAL CA: CPT

## 2022-07-08 PROCEDURE — 2709999900 HC NON-CHARGEABLE SUPPLY

## 2022-07-08 RX ORDER — QUETIAPINE FUMARATE 25 MG/1
25 TABLET, FILM COATED ORAL NIGHTLY
Status: DISCONTINUED | OUTPATIENT
Start: 2022-07-08 | End: 2022-07-08

## 2022-07-08 RX ORDER — HEPARIN SODIUM 1000 [USP'U]/ML
INJECTION, SOLUTION INTRAVENOUS; SUBCUTANEOUS
Status: COMPLETED | OUTPATIENT
Start: 2022-07-08 | End: 2022-07-08

## 2022-07-08 RX ORDER — LORAZEPAM 2 MG/ML
0.5 INJECTION INTRAMUSCULAR EVERY 6 HOURS PRN
Status: DISCONTINUED | OUTPATIENT
Start: 2022-07-08 | End: 2022-07-12

## 2022-07-08 RX ORDER — FENTANYL CITRATE 50 UG/ML
INJECTION, SOLUTION INTRAMUSCULAR; INTRAVENOUS
Status: COMPLETED | OUTPATIENT
Start: 2022-07-08 | End: 2022-07-08

## 2022-07-08 RX ORDER — CLINDAMYCIN PHOSPHATE 600 MG/50ML
600 INJECTION INTRAVENOUS ONCE
Status: COMPLETED | OUTPATIENT
Start: 2022-07-08 | End: 2022-07-08

## 2022-07-08 RX ORDER — GUAIFENESIN DEXTROMETHORPHAN HYDROBROMIDE ORAL SOLUTION 10; 100 MG/5ML; MG/5ML
10 SOLUTION ORAL 3 TIMES DAILY
Status: DISCONTINUED | OUTPATIENT
Start: 2022-07-08 | End: 2022-07-12

## 2022-07-08 RX ORDER — METOPROLOL TARTRATE 5 MG/5ML
5 INJECTION INTRAVENOUS ONCE
Status: COMPLETED | OUTPATIENT
Start: 2022-07-08 | End: 2022-07-08

## 2022-07-08 RX ADMIN — ALBUTEROL SULFATE 2.5 MG: 2.5 SOLUTION RESPIRATORY (INHALATION) at 21:19

## 2022-07-08 RX ADMIN — ALBUTEROL SULFATE 2.5 MG: 2.5 SOLUTION RESPIRATORY (INHALATION) at 07:36

## 2022-07-08 RX ADMIN — ANTI-FUNGAL POWDER MICONAZOLE NITRATE TALC FREE: 1.42 POWDER TOPICAL at 09:10

## 2022-07-08 RX ADMIN — EPOETIN ALFA-EPBX 5000 UNITS: 10000 INJECTION, SOLUTION INTRAVENOUS; SUBCUTANEOUS at 12:46

## 2022-07-08 RX ADMIN — METOPROLOL TARTRATE 5 MG: 5 INJECTION INTRAVENOUS at 21:23

## 2022-07-08 RX ADMIN — SODIUM CHLORIDE, PRESERVATIVE FREE 10 ML: 5 INJECTION INTRAVENOUS at 21:21

## 2022-07-08 RX ADMIN — HEPARIN SODIUM 1600 UNITS: 1000 INJECTION, SOLUTION INTRAVENOUS; SUBCUTANEOUS at 14:54

## 2022-07-08 RX ADMIN — SODIUM CHLORIDE, PRESERVATIVE FREE 10 ML: 5 INJECTION INTRAVENOUS at 09:11

## 2022-07-08 RX ADMIN — SODIUM CHLORIDE, PRESERVATIVE FREE 10 ML: 5 INJECTION INTRAVENOUS at 21:22

## 2022-07-08 RX ADMIN — QUETIAPINE FUMARATE 25 MG: 25 TABLET ORAL at 01:11

## 2022-07-08 RX ADMIN — Medication 81 MG: at 09:06

## 2022-07-08 RX ADMIN — LEVOTHYROXINE SODIUM 25 MCG: 25 TABLET ORAL at 09:06

## 2022-07-08 RX ADMIN — METOPROLOL TARTRATE 25 MG: 25 TABLET ORAL at 01:11

## 2022-07-08 RX ADMIN — AMIODARONE HYDROCHLORIDE 100 MG: 200 TABLET ORAL at 09:07

## 2022-07-08 RX ADMIN — ALBUTEROL SULFATE 2.5 MG: 2.5 SOLUTION RESPIRATORY (INHALATION) at 01:48

## 2022-07-08 RX ADMIN — SODIUM CHLORIDE, PRESERVATIVE FREE 10 MG: 5 INJECTION INTRAVENOUS at 09:05

## 2022-07-08 RX ADMIN — SODIUM CHLORIDE, PRESERVATIVE FREE 10 ML: 5 INJECTION INTRAVENOUS at 09:37

## 2022-07-08 RX ADMIN — CLINDAMYCIN PHOSPHATE 600 MG: 600 INJECTION, SOLUTION INTRAVENOUS at 16:08

## 2022-07-08 RX ADMIN — ATORVASTATIN CALCIUM 40 MG: 80 TABLET, FILM COATED ORAL at 09:06

## 2022-07-08 RX ADMIN — HEPARIN SODIUM 1300 UNITS: 1000 INJECTION INTRAVENOUS; SUBCUTANEOUS at 13:20

## 2022-07-08 RX ADMIN — HEPARIN SODIUM 2000 UNITS: 1000 INJECTION INTRAVENOUS; SUBCUTANEOUS at 02:32

## 2022-07-08 RX ADMIN — DOCUSATE SODIUM LIQUID 100 MG: 100 LIQUID ORAL at 09:06

## 2022-07-08 RX ADMIN — HEPARIN SODIUM 20 UNITS/KG/HR: 10000 INJECTION, SOLUTION INTRAVENOUS at 16:03

## 2022-07-08 RX ADMIN — ANTI-FUNGAL POWDER MICONAZOLE NITRATE TALC FREE: 1.42 POWDER TOPICAL at 21:24

## 2022-07-08 RX ADMIN — HEPARIN SODIUM 1600 UNITS: 1000 INJECTION, SOLUTION INTRAVENOUS; SUBCUTANEOUS at 14:55

## 2022-07-08 RX ADMIN — MAGNESIUM GLUCONATE 500 MG ORAL TABLET 400 MG: 500 TABLET ORAL at 09:05

## 2022-07-08 RX ADMIN — HEPARIN SODIUM 1200 UNITS: 1000 INJECTION INTRAVENOUS; SUBCUTANEOUS at 13:20

## 2022-07-08 RX ADMIN — FENTANYL CITRATE 50 MCG: 50 INJECTION, SOLUTION INTRAMUSCULAR; INTRAVENOUS at 14:50

## 2022-07-08 RX ADMIN — METOPROLOL TARTRATE 25 MG: 25 TABLET ORAL at 09:05

## 2022-07-08 ASSESSMENT — PAIN DESCRIPTION - PAIN TYPE: TYPE: ACUTE PAIN

## 2022-07-08 ASSESSMENT — PAIN DESCRIPTION - ORIENTATION: ORIENTATION: RIGHT

## 2022-07-08 ASSESSMENT — PAIN SCALES - GENERAL: PAINLEVEL_OUTOF10: 0

## 2022-07-08 ASSESSMENT — PAIN DESCRIPTION - ONSET: ONSET: AWAKENED FROM SLEEP

## 2022-07-08 ASSESSMENT — PAIN DESCRIPTION - FREQUENCY: FREQUENCY: INTERMITTENT

## 2022-07-08 NOTE — PROGRESS NOTES
Pulmonary Progress Note    CC:  Chief Complaint   Patient presents with    Bradycardia      Subjective:  She is lethargic but arousable. Fatigued  On 2 L nasal cannula  Permacath placement today          Review of Systems -  Fatigued  Respiratory no cough no sputum  CVS no chest pain  GI no nausea no vomiting    Immunization     There is no immunization history on file for this patient.      Pneumococcal Vaccine     [] Up to date    [] Indicated   [] Refused  [] Contraindicated       Influenza Vaccine   [] Up to date    [] Indicated   [] Refused  [] Contraindicated     PAST MEDICAL HISTORY:       Diagnosis Date    Acute CVA (cerebrovascular accident) ([address] 7/25/2020    Acute kidney injury superimposed on chronic kidney disease ([address] 6/8/2022    Acute on chronic combined systolic (congestive) and diastolic (congestive) heart failure ([address] 2/6/2022    JOY (acute kidney injury) ([address] 1/15/2022    Altered mental status 5/26/2021    Anticoagulated 6/8/2022    Arthritis     Asthma     Bradycardia 6/12/2022    Brain mass     Cellulitis and abscess     Cellulitis and abscess of unspecified site     Cellulitis of both lower extremities 5/26/2021    Cellulitis of left lower extremity 7/26/2020    Cellulitis of lower extremity 10/4/2020    CHF (congestive heart failure) (HCC)     Chronic kidney disease, unspecified     CKD stage 4 secondary to hypertension ([address] 2/20/2020    Coronary atherosclerosis of native coronary artery     Elevated LFTs 2/25/2021    Essential hypertension 7/25/2020    Essential hypertension, malignant     Hallucinations 2/18/2021    Hard of hearing     Head contusion 9/9/2020    Hypertensive emergency 7/25/2020    Hypertensive urgency with joy 6/9/2022    Hypokalemia 9/9/2020    Hypomagnesemia 5/26/2021    Iron deficiency anemia, unspecified     Meningioma ([address] 2/25/2021    Myocardial infarction ([address]     Other and unspecified hyperlipidemia     Pure hypercholesterolemia     Stage 4 chronic kidney disease ([facility]     Toxic metabolic encephalopathy 3/31/0350    Type 2 diabetes mellitus with stage 4 chronic kidney disease, without long-term current use of insulin (HCC)     Type II or unspecified type diabetes mellitus without mention of complication, not stated as uncontrolled     Unspecified asthma(493.90)     Unspecified venous (peripheral) insufficiency     Unspecified vitamin D deficiency     UTI (urinary tract infection) 2/18/2021         Family History:       Problem Relation Age of Onset    Diabetes Mother     Heart Disease Mother     Heart Disease Father     Diabetes Sister     High Blood Pressure Sister     Diabetes Maternal Grandmother        SURGICAL HISTORY:   Past Surgical History:   Procedure Laterality Date    COLONOSCOPY N/A 3/15/2022    COLONOSCOPY DIAGNOSTIC performed by Blaise Dunham MD at [facility].      x 3, Dr. Tanya Mederos  3/7/2022         IR NONTUNNELED VASCULAR CATHETER  6/28/2022    IR NONTUNNELED VASCULAR CATHETER 6/28/2022 Ramesh Severy Redfox, PA [facility]    THORACENTESIS  3/10/2022         TONSILLECTOMY AND ADENOIDECTOMY      UPPER GASTROINTESTINAL ENDOSCOPY N/A 3/15/2022    EGD BIOPSY performed by Blaise Dunham MD at [id number] [facility]:   reports that she quit smoking about 22 years ago. She has a 2.50 pack-year smoking history. She has never used smokeless tobacco.  ETOH:   reports previous alcohol use. ALLERGIES:    Allergies   Allergen Reactions    Latex Rash    Aleve [Naproxen Sodium]      Chronic kidney disease stage III, CHF    Pioglitazone Other (See Comments)     Congestive heart failure    Claritin [Loratadine]     Keflex [Cephalexin]     Lisinopril      Needs clarification of contraindication       Home Meds:   Prior to Admission medications    Medication Sig Start Date End Date Taking?  Authorizing Provider   ferrous sulfate (IRON 325) 325 (65 Fe) MG tablet Take 1 tablet by mouth daily (with breakfast) 6/22/22   Tamiko Gonzalez MD   apixaban (ELIQUIS) 2.5 MG TABS tablet Take 1 tablet by mouth 2 times daily 6/22/22   Tamiko Gonzalez MD   apixaban (ELIQUIS) 2.5 MG TABS tablet Take 1 tablet by mouth 2 times daily 6/22/22   Tamiko Gonzalez MD   ferrous sulfate (IRON 325) 325 (65 Fe) MG tablet Take 1 tablet by mouth daily (with breakfast) 6/22/22   Tamiko Gonzalez MD   fluticasone-salmeterol (ADVAIR HFA) 115-21 MCG/ACT inhaler Inhale 2 puffs into the lungs 2 times daily 6/22/22   Tamiko Gonzalez MD   metoprolol tartrate (LOPRESSOR) 25 MG tablet Take 1 tablet by mouth 2 times daily 6/17/22   Tamiko Gonzalez MD   aspirin EC 81 MG EC tablet Take 1 tablet by mouth daily 6/17/22   Tamiko Gonzalez MD   hydrALAZINE (APRESOLINE) 25 MG tablet Take 1 tablet by mouth 3 times daily 6/17/22   Tamiko Gonzalez MD   albuterol sulfate HFA (PROAIR HFA) 108 (90 Base) MCG/ACT inhaler Inhale 2 puffs into the lungs every 6 hours as needed for Wheezing or Shortness of Breath (cough) 6/17/22   Tamiko Gonzalez MD   metoprolol tartrate (LOPRESSOR) 25 MG tablet Take 1 tablet by mouth 2 times daily 6/17/22   Tamiko Gonzalez MD   aspirin EC 81 MG EC tablet Take 1 tablet by mouth daily 6/17/22   Tamiko Gonzalez MD   hydrALAZINE (APRESOLINE) 25 MG tablet Take 1 tablet by mouth 3 times daily 6/17/22   Tamiko Gonzalez MD   albuterol sulfate HFA (PROAIR HFA) 108 (90 Base) MCG/ACT inhaler Inhale 2 puffs into the lungs every 6 hours as needed for Wheezing or Shortness of Breath (cough) 6/17/22   Tamiko Gonzalez MD   fluticasone-salmeterol (ADVAIR HFA) 058-58 MCG/ACT inhaler Inhale 2 puffs into the lungs 2 times daily Maintenance inhaler 6/17/22   Tamiko Gonzalez MD   amLODIPine (NORVASC) 10 MG tablet Take 1 tablet by mouth nightly 6/11/22   Magui Salinas MD   Blood Pressure KIT Diagnosis: HTN.  Needs to check blood pressure 1-2 times a day until stable, then once a day. Goal blood pressure less than 135/85, and above 110/60. 5/26/22   Reji Almanza MD   vitamin D (ERGOCALCIFEROL) 1.25 MG (55010 UT) CAPS capsule Take 1 capsule by mouth once a week Sundays 4/28/22   Reji Almanza MD   amiodarone (CORDARONE) 200 MG tablet Take 1 tablet by mouth daily 4/15/22   Reji Almanza MD   Nebulizers (COMPRESSOR/NEBULIZER) MISC Nebulizer with compressor. Disposable Med Nebs 2 /month. Reusable Med Nebs 1 per 6 months. Aerosol Mask 1 per month. Replacement Filters 2 per month. All other related supplies as needed per month. Medications being used: Albuterol . 083 unit dose vial. Frequency: every 6 hrs x 4/day . Length of Need: 1  Patient not taking: Reported on 6/17/2022 2/22/22   María Elena Jorge MD   albuterol (PROVENTIL) (2.5 MG/3ML) 0.083% nebulizer solution Take 3 mLs by nebulization every 6 hours as needed for Wheezing or Shortness of Breath  Patient not taking: Reported on 6/17/2022 2/22/22   María Elena Jorge MD   [facility]  Devices (ADJUST FOLD CANE/YORK HANDLE) MISC Use daily for walking 2/22/22   María Elena Jorge MD   Handicap Placard MISC by Does not apply route Expires on 12/30/25 2/22/22   María Elena Jorge MD   atorvastatin (LIPITOR) 40 MG tablet Take 1 tablet by mouth once daily 2/15/22   Reji Almanza MD   desloratadine (CLARINEX) 5 MG tablet Take 1 tablet by mouth once daily 2/15/22   MD Kurt Cadet MISC 1 each by Does not apply route daily Patient needs to contact office before any further refills will be approved 2/15/22   Reji Almanza MD   magnesium oxide (MAG-OX) 400 (241.3 Mg) MG TABS tablet Take 1 tablet by mouth twice daily 2/15/22   Reji Almanza MD   miconazole (MICOTIN) 2 % powder Apply topically 2 times daily UNDER THE SKIN FOLDS LONG TERM 2/15/22   Reji Almanza MD   Multiple Vitamins-Minerals (THERAPEUTIC MULTIVITAMIN-MINERALS) tablet Take 1 tablet by mouth daily 2/15/22 Nate Kumari MD   blood glucose test strips (FREESTYLE LITE) strip 1 each by In Vitro route daily As needed. 3/19/21   Nate Kumari MD   Blood Pressure KIT 1 kit by Does not apply route three times daily  Patient not taking: Reported on 6/17/2022 12/19/19   Laura Hay MD   blood glucose monitor kit and supplies 1 kit by Other route three times daily Dispense Butterfly Elite CBG Device 8/20/19   Luis Fernando Lopez MD         Intake/Output Summary (Last 24 hours) at 7/8/2022 1024  Last data filed at 7/8/2022 0234  Gross per 24 hour   Intake 685.57 ml   Output 175 ml   Net 510.57 ml         Diet   ADULT DIET; Regular; Low Sodium (2 gm); Low Potassium (Less than 3000 mg/day); Low Phosphorus (Less than 1000 mg)    Vitals:   BP (!) 144/53   Pulse 85   Temp 98 °F (36.7 °C) (Axillary)   Resp 21   Ht 4' 11\" (1.499 m)   Wt 148 lb 13 oz (67.5 kg)   SpO2 92%   BMI 30.06 kg/m²  on         I/O (24 Hours)    Patient Vitals for the past 8 hrs:   BP Temp Temp src Pulse Resp SpO2   07/08/22 0810 -- -- -- 85 21 92 %   07/08/22 0800 (!) 144/53 98 °F (36.7 °C) Axillary 84 19 91 %   07/08/22 0737 -- -- -- 78 18 96 %   07/08/22 0400 (!) 131/51 97.7 °F (36.5 °C) Axillary 75 20 96 %       Intake/Output Summary (Last 24 hours) at 7/8/2022 1024  Last data filed at 7/8/2022 0234  Gross per 24 hour   Intake 685.57 ml   Output 175 ml   Net 510.57 ml     I/O last 3 completed shifts:   In: 3124.1 [I.V.:3124.1]  Out: 575 [Urine:575]   Date 07/08/22 0000 - 07/08/22 2359   Shift 4146-4449 7256-5800 8676-3556 24 Hour Total   INTAKE   I.V.(mL/kg) 107.6(1.6)   107.6(1.6)   Shift Total(mL/kg) 107.6(1.6)   107.6(1.6)   OUTPUT   Urine(mL/kg/hr) 0(0)   0   Shift Total(mL/kg) 0(0)   0(0)   Weight (kg) 67.5 67.5 67.5 67.5     Patient Vitals for the past 96 hrs (Last 3 readings):   Weight   07/07/22 0600 148 lb 13 oz (67.5 kg)   07/06/22 1445 149 lb 7.6 oz (67.8 kg)   07/06/22 1049 153 lb 7 oz (69.6 kg)          PHYSICAL EXAMINATION:  Head and neck atraumatic, normocephalic      Somnolent but wakes up    Neck-no JVP elevation    Lungs - Ventilating all lobes without any rales, rhonchi, wheezes or dullness. No bronchial breath sounds. Chest expansion equal bilaterally    CVS- S1, S2 regular. No S3 no S4, no murmurs    Abdomen-nontender, nondistended. Bowel sounds are present. No organomegaly    Lower extremity-no edema    Upper extremity-no edema    Neurological-grossly normal cranial nerves.          Medications:    Scheduled Meds:   QUEtiapine  25 mg Oral Nightly    metoprolol tartrate  25 mg Oral BID    clindamycin (CLEOCIN) IV  600 mg IntraVENous Once    epoetin rick-epbx  5,000 Units IntraVENous Q MWF    docusate  100 mg Oral Daily    famotidine (PEPCID) injection  10 mg IntraVENous Daily    levothyroxine  25 mcg Oral Daily    lidocaine 1 % injection  5 mL IntraDERmal Once    sodium chloride flush  5-40 mL IntraVENous 2 times per day    sodium polystyrene  15 g Oral Once    amiodarone  100 mg Oral Daily    rocuronium  0.6 mg/kg IntraVENous Once    albuterol  2.5 mg Nebulization Q6H    sodium chloride flush  5-40 mL IntraVENous 2 times per day    aspirin EC  81 mg Oral Daily    atorvastatin  40 mg Oral Daily    magnesium oxide  400 mg Oral BID    miconazole   Topical BID    ferrous sulfate  325 mg Oral Daily with breakfast       Continuous Infusions:   sodium chloride      sodium chloride      dexmedetomidine Stopped (07/07/22 1506)    sodium chloride      dextrose      sodium chloride 75 mL/hr at 07/06/22 2000    [Held by provider] heparin (PORCINE) Infusion Stopped (07/08/22 0924)       PRN Meds:  LORazepam, sodium chloride, albumin human, sodium chloride flush, sodium chloride, sodium chloride, fentanNYL, heparin (porcine), heparin (porcine), glucose, dextrose bolus **OR** dextrose bolus, glucagon (rDNA), dextrose, sodium chloride flush, sodium chloride, ondansetron **OR** ondansetron, polyethylene glycol, acetaminophen **OR** acetaminophen, heparin (porcine), heparin (porcine), morphine    Labs:  CBC:   Recent Labs     07/06/22  0456 07/06/22  1757 07/07/22 0447 07/07/22  1826 07/08/22  0421   WBC 8.1  --  8.5  --  10.2   HGB 7.0*   < > 8.1* 9.4* 8.4*   HCT 23.7*   < > 27.5* 31.5* 27.8*   .7  --  102.2  --  100.0   PLT See Reflexed IPF Result  --  127*  --  173    < > = values in this interval not displayed. BMP:   Recent Labs     07/06/22  0456 07/07/22 0447 07/08/22 0421   * 136 131*   K 4.4 3.7 3.9   CL 99 100 98   CO2 27 27 27   BUN 44* 23 31*   CREATININE 2.02* 1.35* 1.97*     LIVER PROFILE: No results for input(s): AST, ALT, LIPASE, BILIDIR, BILITOT, ALKPHOS in the last 72 hours. Invalid input(s): AMYLASE,  ALB  PT/INR: No results for input(s): PROTIME, INR in the last 72 hours. APTT:   Recent Labs     07/07/22  1826 07/08/22  0055 07/08/22  0823   APTT 36.7* 43.6* 57.2*     UA:No results for input(s): NITRITE, COLORU, PHUR, LABCAST, WBCUA, RBCUA, MUCUS, TRICHOMONAS, YEAST, BACTERIA, CLARITYU, SPECGRAV, LEUKOCYTESUR, UROBILINOGEN, BILIRUBINUR, BLOODU, GLUCOSEU, AMORPHOUS in the last 72 hours. Invalid input(s): KETONESU  No results for input(s): PHART, RXU2RPX, PO2ART in the last 72 hours. ABG   No results found for: PH, PCO2, PO2, HCO3, O2SAT  Lab Results   Component Value Date/Time    MODE [facility] 07/06/2022 04:40 AM         Assessment:   Principal Problem:    Bradycardia  Active Problems:    Lymphedema    Acute kidney injury superimposed on CKD (HCC)    Hyperkalemia    Shock (HCC)    Leukocytosis    Acute respiratory failure with hypoxia and hypercapnia (HCC)    Pulmonary edema cardiac cause (HCC)    Dependence on renal dialysis (HCC)    Nephrotic range proteinuria    Arterial hypotension    Acute on chronic diastolic congestive heart failure (HCC)    JOY (acute kidney injury) ([facility]  Resolved Problems:    * No resolved hospital problems.  * Plan:    Permacath placement today.   Hemodialysis as per nephrology  Continue bronchodilator therapy        Electronically signed by Gricelda Kebede MD on 7/8/2022 at 10:24 AM

## 2022-07-08 NOTE — PROGRESS NOTES
Dialysis Time Out  To be done by RN and tech or 2 RNs  Staff Names Ernesto Lucio RN / Gilbert Ortiz RN    [x]  Identity of the patient using 2 patient identifiers  [x]  Consent for treatment  [x]  Equipment-proper machine and dialyzer  [x]  B-Hep B status  [x]  Orders- to include bath, blood flow, dialyzer, time and fluid removal  [x]  Access-Correct site and in working order  []  Time for patient to ask questions.

## 2022-07-08 NOTE — PROGRESS NOTES
[facility])  Occupational Therapy Not Seen Note    DATE: 2022    NAME: Carly Haro  MRN: [id number]   : 1950      Patient not seen this date for Occupational Therapy due to:    Hemodialysis:Per chart    Next Scheduled Treatment: Ck      Electronically signed by Benito Olson OT on 2022 at 10:27 AM

## 2022-07-08 NOTE — PROGRESS NOTES
Patient assessed around  and patient was alert and oriented to self, place and time. Family called out around 1800 to notify staff that patient is attempting to get out of bed and pulling out lines. Family also stating patient is screaming her former 's name and patient not able to recognize daughters. Concerned because of rapid change in mentation. Provider notified.  Awaiting for intervention

## 2022-07-08 NOTE — CARE COORDINATION
Spoke to Guilherme at [facility] [place]. They are OON. Spoke to Lucy Bauman at [employer] patient access. [place] is OON. Spoke to Julius Gan at [address] [place] [address]. They are OON. Referrals sent to [facility] and [facility]. Spoke to Adelia at [facility]. They are OON.  Voicemail left for admissions at [facility]

## 2022-07-08 NOTE — PLAN OF CARE
Problem: Discharge Planning  Goal: Discharge to home or other facility with appropriate resources  Outcome: Progressing  Flowsheets (Taken 7/7/2022 1841 by Aakash Last RN)  Discharge to home or other facility with appropriate resources:   Identify barriers to discharge with patient and caregiver   Arrange for needed discharge resources and transportation as appropriate   Identify discharge learning needs (meds, wound care, etc)   Refer to discharge planning if patient needs post-hospital services based on physician order or complex needs related to functional status, cognitive ability or social support system     Problem: Chronic Conditions and Co-morbidities  Goal: Patient's chronic conditions and co-morbidity symptoms are monitored and maintained or improved  Outcome: Progressing     Problem: Skin/Tissue Integrity  Goal: Absence of new skin breakdown  Description: 1. Monitor for areas of redness and/or skin breakdown  2. Assess vascular access sites hourly  3. Every 4-6 hours minimum:  Change oxygen saturation probe site  4. Every 4-6 hours:  If on nasal continuous positive airway pressure, respiratory therapy assess nares and determine need for appliance change or resting period.   Outcome: Progressing     Problem: Pain  Goal: Verbalizes/displays adequate comfort level or baseline comfort level  Outcome: Progressing     Problem: Safety - Adult  Goal: Free from fall injury  Outcome: Progressing     Problem: ABCDS Injury Assessment  Goal: Absence of physical injury  Outcome: Progressing

## 2022-07-08 NOTE — H&P
[facility]  Office: [address], DO, Denisse Alejandro, DO, Jessica Carter, DO, [facility], DO, Zheng Diana MD, Elham Jesus MD, Dominique Veras MD, Camille Brock MD, Maryjane Love MD, Nicholas Steinberg MD, Vijaya Gilbert MD, [place] Marie, DO, Jared Richardson MD,  Sushil Escobedo MD, Rupa Horn MD, Yakov Beatty, DO, Melanie Barnhart MD, Jie Boyer MD, Toan Maria DO, Jorge Teran MD, Kim Layton MD, Davis Goldman, [facility], [facility] Gut[facility]rez, CNP, Winsome Zaragoza, CNP, Stacie Calzada, CNP, Janet Abrams, CNP, Silvia Shah, CNP, [place] Diana PA-C, Ángel Khan, [facility], Mariah Lind, CNP, Anjum Davis, CNP, Tu Nielsen, CNP, Ester Curiel, CNS, Luis Turner, [facility], Nicci Walter, CNP, Macy Menon, CNP, Micah Tovar, [facility]   [address] [facility]    HISTORY AND PHYSICAL EXAMINATION            Date:   7/8/2022  Patient name:  Neo Douglas  Date of admission:  6/27/2022 12:28 PM  MRN:   [id number]  Account:  [de-identified]  YOB: 1950  PCP:    Reva Judd MD  Room:   2019/2019-01  Code Status:    Full Code    Chief Complaint:     Chief Complaint   Patient presents with    Bradycardia     History Obtained From:    electronic medical record  History of Present Illness:   65-year-old with prior history of CKD stage IV( baseline creatinine 1.8-2), chronic diastolic CHF, hypertension, CAD s/p CABG in 2003 and occluded SVG to OM 2 and cath in 2006, COPD, type 2 diabetes, A. fib on amiodarone and Eliquis  Presented to ED on 6/27/2021 with complaints of shortness of breath hypoxia improved on 90s, and fatigue. Was found to be bradycardic heart rate in 35 by EMS and was placed on transcutaneous pacing and received atropine. She was subsequently started on epinephrine drip and admitted to the ICU. X-ray consistent with pulmonary edema.   Cardiology was consulted and switched to dopamine drip, and Eliquis was switched to heparin WBP. Was also noted to be hyperkalemic and started on hemodialysis sec to worsening CKD on 06/28/22. Neurology was consulted for encephalopathy : noted to have right anterior temporal fossa meningioma with possible edema. MRI considered nonemergent and deferred.,   ID was consulted for leukocytosis and b/l LE wounds which are chronic(sees wound care clinic outpatient) and Right gluteal /coccygeal ulcerations. Started on Cefepime.   -On 6/28/2022 she had progressively worsening mentation with worsening hypoxia and hypercapnia she was intubated and started on HD. Heparin was held on 6/29-07/01 secondary to oozing at right IJ dialysis catheter site. Patient was continued on empiric antibiotics through 7/1/22(urine and blood cx NG), dialysis/UF and subsequently extubated on 07/06/22. Received 1u PRBC 7/7/22 for hb 7.0  Today she is on 2l NC O2, K 3.9, bicarb 27, Hb 8.4, lethargic, arousable but falling asleep easily, appears tired.    Past Medical History:     Past Medical History:   Diagnosis Date    Acute CVA (cerebrovascular accident) ([address] 7/25/2020    Acute kidney injury superimposed on chronic kidney disease ([address] 6/8/2022    Acute on chronic combined systolic (congestive) and diastolic (congestive) heart failure ([address] 2/6/2022    JOY (acute kidney injury) ([address] 1/15/2022    Altered mental status 5/26/2021    Anticoagulated 6/8/2022    Arthritis     Asthma     Bradycardia 6/12/2022    Brain mass     Cellulitis and abscess     Cellulitis and abscess of unspecified site     Cellulitis of both lower extremities 5/26/2021    Cellulitis of left lower extremity 7/26/2020    Cellulitis of lower extremity 10/4/2020    CHF (congestive heart failure) (HCC)     Chronic kidney disease, unspecified     CKD stage 4 secondary to hypertension ([address] 2/20/2020    Coronary atherosclerosis of native coronary artery     Elevated LFTs 2/25/2021    Essential mouth 2 times daily 6/22/22   Sonja Hi MD   ferrous sulfate (IRON 325) 325 (65 Fe) MG tablet Take 1 tablet by mouth daily (with breakfast) 6/22/22   Sonja Hi MD   fluticasone-salmeterol (ADVAIR HFA) 115-21 MCG/ACT inhaler Inhale 2 puffs into the lungs 2 times daily 6/22/22   Sonja Hi MD   metoprolol tartrate (LOPRESSOR) 25 MG tablet Take 1 tablet by mouth 2 times daily 6/17/22   Sonja Hi MD   aspirin EC 81 MG EC tablet Take 1 tablet by mouth daily 6/17/22   Sonja Hi MD   hydrALAZINE (APRESOLINE) 25 MG tablet Take 1 tablet by mouth 3 times daily 6/17/22   Sonja Hi MD   albuterol sulfate HFA (PROAIR HFA) 108 (90 Base) MCG/ACT inhaler Inhale 2 puffs into the lungs every 6 hours as needed for Wheezing or Shortness of Breath (cough) 6/17/22   Sonja Hi MD   metoprolol tartrate (LOPRESSOR) 25 MG tablet Take 1 tablet by mouth 2 times daily 6/17/22   Sonja Hi MD   aspirin EC 81 MG EC tablet Take 1 tablet by mouth daily 6/17/22   Sonja Hi MD   hydrALAZINE (APRESOLINE) 25 MG tablet Take 1 tablet by mouth 3 times daily 6/17/22   Sonja Hi MD   albuterol sulfate HFA (PROAIR HFA) 108 (90 Base) MCG/ACT inhaler Inhale 2 puffs into the lungs every 6 hours as needed for Wheezing or Shortness of Breath (cough) 6/17/22   Sonja Hi MD   fluticasone-salmeterol (ADVAIR HFA) 706-88 MCG/ACT inhaler Inhale 2 puffs into the lungs 2 times daily Maintenance inhaler 6/17/22   Sonja Hi MD   amLODIPine (NORVASC) 10 MG tablet Take 1 tablet by mouth nightly 6/11/22   David Mendoza MD   Blood Pressure KIT Diagnosis: HTN. Needs to check blood pressure 1-2 times a day until stable, then once a day.  Goal blood pressure less than 135/85, and above 110/60. 5/26/22   Sonja Hi MD   vitamin D (ERGOCALCIFEROL) 1.25 MG (57022 UT) CAPS capsule Take 1 capsule by mouth once a week Sundays 4/28/22   Sonja Hi MD amiodarone (CORDARONE) 200 MG tablet Take 1 tablet by mouth daily 4/15/22   Shila Martin MD   Nebulizers (COMPRESSOR/NEBULIZER) MISC Nebulizer with compressor. Disposable Med Nebs 2 /month. Reusable Med Nebs 1 per 6 months. Aerosol Mask 1 per month. Replacement Filters 2 per month. All other related supplies as needed per month. Medications being used: Albuterol . 083 unit dose vial. Frequency: every 6 hrs x 4/day . Length of Need: 1  Patient not taking: Reported on 6/17/2022 2/22/22   Grace Rivero MD   albuterol (PROVENTIL) (2.5 MG/3ML) 0.083% nebulizer solution Take 3 mLs by nebulization every 6 hours as needed for Wheezing or Shortness of Breath  Patient not taking: Reported on 6/17/2022 2/22/22   Grace Rivero MD   Misc. Devices (ADJUST FOLD CANE/YORK HANDLE) MISC Use daily for walking 2/22/22   Grace Rivero MD   Handicap Placard MISC by Does not apply route Expires on 12/30/25 2/22/22   Grace Rivero MD   atorvastatin (LIPITOR) 40 MG tablet Take 1 tablet by mouth once daily 2/15/22   Shila Martin MD   desloratadine (CLARINEX) 5 MG tablet Take 1 tablet by mouth once daily 2/15/22   Shila Martin MD   FreeStyle Lancets MISC 1 each by Does not apply route daily Patient needs to contact office before any further refills will be approved 2/15/22   Shila Martin MD   magnesium oxide (MAG-OX) 400 (241.3 Mg) MG TABS tablet Take 1 tablet by mouth twice daily 2/15/22   Shila Martin MD   miconazole (MICOTIN) 2 % powder Apply topically 2 times daily UNDER THE SKIN FOLDS LONG TERM 2/15/22   Shila Martin MD   Multiple Vitamins-Minerals (THERAPEUTIC MULTIVITAMIN-MINERALS) tablet Take 1 tablet by mouth daily 2/15/22   Shila Martin MD   blood glucose test strips (FREESTYLE LITE) strip 1 each by In Vitro route daily As needed.  3/19/21   Shila Martin MD   Blood Pressure KIT 1 kit by Does not apply route three times daily  Patient not taking: Reported on 6/17/2022 12/19/19 Tammy Lynn MD   blood glucose monitor kit and supplies 1 kit by Other route three times daily Dispense Butterfly Elite CBG Device 19   Erin Olson MD        Allergies:     Latex, Aleve [naproxen sodium], Pioglitazone, Claritin [loratadine], Keflex [cephalexin], and Lisinopril    Social History:     Tobacco:    reports that she quit smoking about 22 years ago. She has a 2.50 pack-year smoking history. She has never used smokeless tobacco.  Alcohol:      reports previous alcohol use. Drug Use:  reports no history of drug use. Family History:     Family History   Problem Relation Age of Onset    Diabetes Mother     Heart Disease Mother     Heart Disease Father     Diabetes Sister     High Blood Pressure Sister     Diabetes Maternal Grandmother        Review of Systems:     Unable to review reliably   Physical Exam:   BP (!) 144/53   Pulse 85   Temp 98 °F (36.7 °C) (Axillary)   Resp 21   Ht 4' 11\" (1.499 m)   Wt 148 lb 13 oz (67.5 kg)   SpO2 92%   BMI 30.06 kg/m²   Temp (24hrs), Av.5 °F (36.4 °C), Min:97.2 °F (36.2 °C), Max:98 °F (36.7 °C)    Recent Labs     22  0440 22  0805   POCGLU 163* 133*       Intake/Output Summary (Last 24 hours) at 2022 0840  Last data filed at 2022 0234  Gross per 24 hour   Intake 685.57 ml   Output 175 ml   Net 510.57 ml       General Appearance: awake but somnolent Mental status: unable to review on 2l NC O2.    Head: normocephalic, atraumatic  Eye: no icterus, redness, pupils equal and reactive, extraocular eye movements intact, conjunctiva clear  Ear: normal external ear, no discharge, hearing intact Nose: no drainage noted  Mouth: mucous membranes moist  Lungs: Bilateral equal air entry, clear to ausculation, no wheezing, rales or rhonchi, normal effort  Cardiovascular: normal rate, regular rhythm, no murmur, gallop, rub  Abdomen: Soft, nontender, nondistended, normal bowel sounds, no hepatomegaly or splenomegaly  Neurologic: unable to complete Skin: No gross lesions, rashes, bruising or bleeding on exposed skin area  Extremities: peripheral pulses palpable, no pedal edema or calf pain with palpation    Investigations:      Laboratory Testing:  Recent Results (from the past 24 hour(s))   APTT    Collection Time: 07/07/22 12:53 PM   Result Value Ref Range    PTT 29.0 20.5 - 30.5 sec   Hemoglobin and Hematocrit    Collection Time: 07/07/22  6:26 PM   Result Value Ref Range    Hemoglobin 9.4 (L) 11.9 - 15.1 g/dL    Hematocrit 31.5 (L) 36.3 - 47.1 %   APTT    Collection Time: 07/07/22  6:26 PM   Result Value Ref Range    PTT 36.7 (H) 20.5 - 30.5 sec   APTT    Collection Time: 07/08/22 12:55 AM   Result Value Ref Range    PTT 43.6 (H) 20.5 - 30.5 sec   Basic Metabolic Panel w/ Reflex to MG    Collection Time: 07/08/22  4:21 AM   Result Value Ref Range    Glucose 150 (H) 70 - 99 mg/dL    BUN 31 (H) 8 - 23 mg/dL    CREATININE 1.97 (H) 0.50 - 0.90 mg/dL    Calcium 8.1 (L) 8.6 - 10.4 mg/dL    Sodium 131 (L) 135 - 144 mmol/L    Potassium 3.9 3.7 - 5.3 mmol/L    Chloride 98 98 - 107 mmol/L    CO2 27 20 - 31 mmol/L    Anion Gap 6 (L) 9 - 17 mmol/L    GFR Non-African American 25 (L) >60 mL/min    GFR African American 30 (L) >60 mL/min    GFR Comment         CBC with Auto Differential    Collection Time: 07/08/22  4:21 AM   Result Value Ref Range    WBC 10.2 3.5 - 11.3 k/uL    RBC 2.78 (L) 3.95 - 5.11 m/uL    Hemoglobin 8.4 (L) 11.9 - 15.1 g/dL    Hematocrit 27.8 (L) 36.3 - 47.1 %    .0 82.6 - 102.9 fL    MCH 30.2 25.2 - 33.5 pg    MCHC 30.2 28.4 - 34.8 g/dL    RDW 17.2 (H) 11.8 - 14.4 %    Platelets 822 647 - 832 k/uL    MPV 9.8 8.1 - 13.5 fL    NRBC Automated 0.2 (H) 0.0 per 100 WBC    RBC Morphology ANISOCYTOSIS PRESENT     Seg Neutrophils 73 (H) 36 - 65 %    Lymphocytes 8 (L) 24 - 43 %    Monocytes 13 (H) 3 - 12 %    Eosinophils % 1 1 - 4 %    Basophils 1 0 - 2 %    Immature Granulocytes 5 (H) 0 %    Segs Absolute 7.48 1.50 - 8.10 k/uL Absolute Lymph # 0.79 (L) 1.10 - 3.70 k/uL    Absolute Mono # 1.33 (H) 0.10 - 1.20 k/uL    Absolute Eos # 0.05 0.00 - 0.44 k/uL    Basophils Absolute 0.07 0.00 - 0.20 k/uL    Absolute Immature Granulocyte 0.47 (H) 0.00 - 0.30 k/uL   POC Glucose Fingerstick    Collection Time: 07/08/22  8:05 AM   Result Value Ref Range    POC Glucose 133 (H) 65 - 105 mg/dL       Imaging/Diagnostics:  XR CHEST PORTABLE    Result Date: 7/3/2022  Mild increased perihilar edema, mild increased perihilar opacification, likely edema and CHF. Persistent small effusions and bibasilar atelectasis. Satisfactory position of endotracheal tube. Assessment :      Hospital Problems           Last Modified POA    * (Principal) Bradycardia 6/27/2022 Yes    Lymphedema 6/28/2022 Yes    Acute kidney injury superimposed on CKD ([address] 6/28/2022 Yes    Hyperkalemia 6/28/2022 Yes    Shock ([address] 6/28/2022 Yes    Leukocytosis 6/28/2022 Yes    Acute respiratory failure with hypoxia and hypercapnia ([address] 6/28/2022 Yes    Pulmonary edema cardiac cause ([address] 6/28/2022 Yes    Dependence on renal dialysis ([address] 7/5/2022 Yes    Nephrotic range proteinuria 7/5/2022 Yes    Arterial hypotension 7/5/2022 Yes    Acute on chronic diastolic congestive heart failure ([address] 6/28/2022 Yes    JOY (acute kidney injury) ([address] 7/5/2022 Yes        Plan:     Patient status inpatient in the Progressive Unit/Step down  -Hemodialysis management as per nephrology. Hold heparin for tunneled catheter placement by IR today. We will resume after replacement.  -If hemoglobin remains stable on heparin drip for 24 hours will switch to oral anticoagulation.  -Continue aspirin, statin, beta-blocker,Amiodarone.  -Continue Synthroid at home dose. - on NC 2lit :at baseline,continue to monitor. -DC Seroquel. Likely reason for somnolence this morning.   - GI prophylaxis.      Consultations:   IP CONSULT TO CARDIOLOGY  IP CONSULT TO CRITICAL CARE  IP CONSULT TO NEPHROLOGY  IP CONSULT TO NEUROLOGY  IP CONSULT TO INFECTIOUS DISEASES  IP CONSULT TO DIETITIAN  IP CONSULT TO DIETITIAN     Patient is admitted as inpatient status because of co-morbidities listed above, severity of signs and symptoms as outlined, requirement for current medical therapies and most importantly because of direct risk to patient if care not provided in a hospital setting. Expected length of stay > 48 hours.     June Garrison MD  7/8/2022  8:40 AM    Copy sent to Dr. Barbara Bowling MD

## 2022-07-08 NOTE — PROGRESS NOTES
Renal Progress Note    Patient :  Taniya Whitlock; 66 y.o. MRN# [id number]  Location:  2019/2019-01  Attending:  Michael Wei MD  Admit Date:  6/27/2022   Hospital Day: 11    Subjective:     Patient was seen and examined on HD. Tolerating the procedure well. No new issues reported overnight. She was extubated 7/6/2022. Now off of pressor support. JOY on HD, currently on MWF schedule. BMP results from today reviewed. Potassium 3.9, bicarb 27. Urine output documented as about 175 cc in the last 24 hours.  working towards outpatient hemodialysis set up.   Outpatient Medications:     Medications Prior to Admission: ferrous sulfate (IRON 325) 325 (65 Fe) MG tablet, Take 1 tablet by mouth daily (with breakfast)  apixaban (ELIQUIS) 2.5 MG TABS tablet, Take 1 tablet by mouth 2 times daily  apixaban (ELIQUIS) 2.5 MG TABS tablet, Take 1 tablet by mouth 2 times daily  ferrous sulfate (IRON 325) 325 (65 Fe) MG tablet, Take 1 tablet by mouth daily (with breakfast)  fluticasone-salmeterol (ADVAIR HFA) 115-21 MCG/ACT inhaler, Inhale 2 puffs into the lungs 2 times daily  metoprolol tartrate (LOPRESSOR) 25 MG tablet, Take 1 tablet by mouth 2 times daily  aspirin EC 81 MG EC tablet, Take 1 tablet by mouth daily  hydrALAZINE (APRESOLINE) 25 MG tablet, Take 1 tablet by mouth 3 times daily  albuterol sulfate HFA (PROAIR HFA) 108 (90 Base) MCG/ACT inhaler, Inhale 2 puffs into the lungs every 6 hours as needed for Wheezing or Shortness of Breath (cough)  metoprolol tartrate (LOPRESSOR) 25 MG tablet, Take 1 tablet by mouth 2 times daily  aspirin EC 81 MG EC tablet, Take 1 tablet by mouth daily  hydrALAZINE (APRESOLINE) 25 MG tablet, Take 1 tablet by mouth 3 times daily  albuterol sulfate HFA (PROAIR HFA) 108 (90 Base) MCG/ACT inhaler, Inhale 2 puffs into the lungs every 6 hours as needed for Wheezing or Shortness of Breath (cough)  fluticasone-salmeterol (ADVAIR HFA) 115-21 MCG/ACT inhaler, Inhale 2 puffs into 6/17/2022)  blood glucose monitor kit and supplies, 1 kit by Other route three times daily Dispense Butterfly Elite CBG Device    Current Medications:     Scheduled Meds:    QUEtiapine  25 mg Oral Nightly    metoprolol tartrate  25 mg Oral BID    clindamycin (CLEOCIN) IV  600 mg IntraVENous Once    epoetin rick-epbx  5,000 Units IntraVENous Q MWF    docusate  100 mg Oral Daily    famotidine (PEPCID) injection  10 mg IntraVENous Daily    levothyroxine  25 mcg Oral Daily    lidocaine 1 % injection  5 mL IntraDERmal Once    sodium chloride flush  5-40 mL IntraVENous 2 times per day    sodium polystyrene  15 g Oral Once    amiodarone  100 mg Oral Daily    rocuronium  0.6 mg/kg IntraVENous Once    albuterol  2.5 mg Nebulization Q6H    sodium chloride flush  5-40 mL IntraVENous 2 times per day    aspirin EC  81 mg Oral Daily    atorvastatin  40 mg Oral Daily    magnesium oxide  400 mg Oral BID    miconazole   Topical BID    ferrous sulfate  325 mg Oral Daily with breakfast     Continuous Infusions:    sodium chloride      sodium chloride      dexmedetomidine Stopped (07/07/22 1506)    sodium chloride      dextrose      sodium chloride 75 mL/hr at 07/06/22 2000    heparin (PORCINE) Infusion Stopped (07/08/22 0924)     PRN Meds:  LORazepam, sodium chloride, albumin human, sodium chloride flush, sodium chloride, sodium chloride, fentanNYL, heparin (porcine), heparin (porcine), glucose, dextrose bolus **OR** dextrose bolus, glucagon (rDNA), dextrose, sodium chloride flush, sodium chloride, ondansetron **OR** ondansetron, polyethylene glycol, acetaminophen **OR** acetaminophen, heparin (porcine), heparin (porcine), morphine    Input/Output:       I/O last 3 completed shifts: In: 3124.1 [I.V.:3124.1]  Out: 575 [Urine:575].       Patient Vitals for the past 96 hrs (Last 3 readings):   Weight   07/07/22 0600 148 lb 13 oz (67.5 kg)   07/06/22 1445 149 lb 7.6 oz (67.8 kg)   07/06/22 1049 153 lb 7 oz (69.6 kg)       Vital Signs:   Temperature:  Temp: 98 °F (36.7 °C)  TMax:   Temp (24hrs), Av.5 °F (36.4 °C), Min:97.2 °F (36.2 °C), Max:98 °F (36.7 °C)    Respirations:  Resp: 21  Pulse:   Heart Rate: 85  BP:    BP: (!) 144/53  BP Range: Systolic (45BIU), CZC:141 , Min:128 , RCH:835       Diastolic (99TCZ), TCV:80, Min:38, Max:96      Physical Examination:     General:  Alert and awake and doing better, no acute distress. HEENT: Atraumatic, normocephalic, no throat congestion, moist mucosa. Eyes:   Pupils equal, round and reactive to light. Neck:   Supple  Chest:   Bilateral vesicular breath sounds, no rales or wheezes. Cardiac:  S1 S2 RR, no murmurs, gallops or rubs. Abdomen: Soft, no masses or organomegaly, BS audible. :   No suprapubic or flank tenderness. Neuro:  Alert and awake and doing better, no acute distress. SKIN:  No rashes, good skin turgor. Extremities:  Positive dependent and trace LE edema. Labs:       Recent Labs     22  18222  042   WBC 8.1  --  8.5  --  10.2   RBC 2.33*  --  2.69*  --  2.78*   HGB 7.0*   < > 8.1* 9.4* 8.4*   HCT 23.7*   < > 27.5* 31.5* 27.8*   .7  --  102.2  --  100.0   MCH 30.0  --  30.1  --  30.2   MCHC 29.5  --  29.5  --  30.2   RDW 16.3*  --  17.3*  --  17.2*   PLT See Reflexed IPF Result  --  127*  --  173   MPV  --   --  9.7  --  9.8    < > = values in this interval not displayed.       BMP:   Recent Labs     22  0421   * 136 131*   K 4.4 3.7 3.9   CL 99 100 98   CO2 27 27 27   BUN 44* 23 31*   CREATININE 2.02* 1.35* 1.97*   GLUCOSE 172* 111* 150*   CALCIUM 8.1* 7.8* 8.1*     YRIS:      Lab Results   Component Value Date/Time    YRIS NEGATIVE 10/27/2020 04:37 PM     SPEP:  Lab Results   Component Value Date/Time    PROT 5.3 2022 02:59 PM    ALBCAL 2.5 2022 03:00 PM    ALBPCT 48 2022 03:00 PM    LABALPH 0.3 2022 03:00 PM    LABALPH 1.2 06/01/2022 03:00 PM    A1PCT 6 06/01/2022 03:00 PM    A2PCT 23 06/01/2022 03:00 PM    LABBETA 0.7 06/01/2022 03:00 PM    BETAPCT 13 06/01/2022 03:00 PM    GAMGLOB 0.5 06/01/2022 03:00 PM    GGPCT 10 06/01/2022 03:00 PM    PATH ELECTRONICALLY SIGNED. Shady Mock M.D. 06/01/2022 03:00 PM    PATH ELECTRONICALLY SIGNED. Shady Mock M.D. 06/01/2022 03:00 PM     UPEP:     Lab Results   Component Value Date/Time    LABPE  10/27/2020 04:35 PM     ELEVATED PROTEIN CONCENTRATION. MOST SERUM PROTEINS ARE DETECTED IN THIS URINE.   USUALLY OBSERVED WITH MARKEDLY INCREASED NON-SELECTIVE GLOMERULAR PERMEABILITY (i.e. SEVERE GLOMERULAR DISEASE), CONTAMINATION OF     C3:     Lab Results   Component Value Date/Time    C3 147 10/27/2020 04:37 PM     C4:     Lab Results   Component Value Date/Time    C4 29 10/27/2020 04:37 PM     MPO ANCA:     Lab Results   Component Value Date/Time    MPO 5 10/27/2020 04:37 PM     PR3 ANCA:     Lab Results   Component Value Date/Time    PR3 16 10/27/2020 04:37 PM     Hep BsAg:         Lab Results   Component Value Date/Time    HEPBSAG NONREACTIVE 06/28/2022 02:56 PM     Hep C AB:          Lab Results   Component Value Date/Time    HEPCAB NONREACTIVE 06/28/2022 02:56 PM     Urinalysis/Chemistries:      Lab Results   Component Value Date/Time    NITRU NEGATIVE 06/27/2022 06:35 PM    COLORU Dark Yellow 06/27/2022 06:35 PM    PHUR 5.5 06/27/2022 06:35 PM    WBCUA 20 TO 50 06/27/2022 06:35 PM    RBCUA 20 TO 50 06/27/2022 06:35 PM    MUCUS 1+ 06/27/2022 06:35 PM    TRICHOMONAS NOT REPORTED 02/07/2022 02:42 AM    YEAST NOT REPORTED 02/07/2022 02:42 AM    BACTERIA FEW 06/12/2022 09:33 PM    SPECGRAV 1.030 06/27/2022 06:35 PM    LEUKOCYTESUR SMALL 06/27/2022 06:35 PM    UROBILINOGEN Normal 06/27/2022 06:35 PM    BILIRUBINUR NEGATIVE 06/27/2022 06:35 PM    GLUCOSEU 1+ 06/27/2022 06:35 PM    KETUA TRACE 06/27/2022 06:35 PM    AMORPHOUS NOT REPORTED 02/07/2022 02:42 AM     Urine Sodium:     Lab Results   Component Value Date/Time    SLOAN <20 06/08/2022 09:52 PM      Urine Creatinine:     Lab Results   Component Value Date/Time    LABCREA 63.0 06/08/2022 09:52 PM     Radiology:     Reviewed. Assessment:     1. JOY currently HD dependent, likely secondary to ATN from hypotension requiring pressor support vs progression from underlying diabetic nephropathy, getting dialysis as per [facility] schedule. Hemodialysis initiated on 6/28/2022. 2. Hyperkalemia likely from progressive decline in renal function, now improved with dialysis. 3. Nephrotic range proteinuria likely due to diabetic nephropathy. 4. Diabetes type 2.  5. Currently on mechanical ventilation. 6. Nonhealing lower extremity ulcers. 7. Hypotension did require pressor support this admission. 8. Fluid overload  9. Drop in hemoglobin. Plan:   1. Patient was seen and examined on HD at bedside. Orders were confirmed with the HD nurse. Running today for 3 hours. 2. Patient to get tunneled catheter placement done by IR possibly today versus Monday. 3. Monitor strict I/Os and renal function for signs of improvement. 4. Bladder scan every shift and St. Cath if >250 cc.   5. BMP in AM.  6.  working towards setting up outpatient dialysis spot. 7. Will follow. Nutrition   Please ensure that patient is on a renal diet/TF. Avoid nephrotoxic drugs/contrast exposure. Vamsi Wyman MD  [facility]     This note is created with the assistance of a speech-recognition program. While intending to generate a document that actually reflects the content of the visit, no guarantees can be provided that every mistake has been identified and corrected by editing.

## 2022-07-08 NOTE — BRIEF OP NOTE
Brief Postoperative Note    Hayley Salcido  YOB: 1950  [id number]    Pre-operative Diagnosis: Acute Renal Failure      Post-operative Diagnosis: Same    Procedure: Tunneled Dialysis Catheter    Medication Given: fentanyl    Anesthesia: 1%Lidocaine     Surgeons/Assistants: SEBASTIAN Reyes and Calixto Cheng MD    Estimated Blood Loss: Minimal    Complications: none    14 Fr x 19 cm tip to cuff palindrome tunneled HD Catheter placed successfully via the Site:  Right Internal Jugular Vein. Catheter secured to skin, dressing applied. Catheter locked with Heparin. May use HD cath for dialysis.     Electronically signed by SEBASTIAN Reyes on 7/8/2022 at 3:40 PM

## 2022-07-08 NOTE — PROGRESS NOTES
Dialysis Post Treatment Note  Vitals:    07/08/22 1340   BP: (!) 158/62   Pulse: 81   Resp: 20   Temp: 97.7 (36.5)   SpO2: 100%     Pre-Weight = 71.5kg  Post-weight = Weight: 153 lb 7 oz (69.6 kg)  Total Liters Processed = Blood Volume Processed (Liters): 73 l/min  Rinseback Volume (mL) = Rinseback Volume (ml): 230 ml  Net Removal (mL) = 1570mL  Patient's dry weight=  Type of access used= Right perm cath  Length of treatment=180 mins    Pt tolerated tx well; no acute distress noted pre, during or post tx.

## 2022-07-08 NOTE — PLAN OF CARE
Problem: Discharge Planning  Goal: Discharge to home or other facility with appropriate resources  7/8/2022 1804 by Daryle Margarita, RN  Outcome: Progressing  7/8/2022 0608 by Gisele Golden RN  Outcome: Progressing  Flowsheets (Taken 7/7/2022 1841 by Pat Salas RN)  Discharge to home or other facility with appropriate resources:   Identify barriers to discharge with patient and caregiver   Arrange for needed discharge resources and transportation as appropriate   Identify discharge learning needs (meds, wound care, etc)   Refer to discharge planning if patient needs post-hospital services based on physician order or complex needs related to functional status, cognitive ability or social support system     Problem: Chronic Conditions and Co-morbidities  Goal: Patient's chronic conditions and co-morbidity symptoms are monitored and maintained or improved  7/8/2022 1804 by Daryle Margarita, RN  Outcome: Progressing  7/8/2022 0608 by Gisele Golden RN  Outcome: Progressing     Problem: Pain  Goal: Verbalizes/displays adequate comfort level or baseline comfort level  7/8/2022 1804 by Daryle Margarita, RN  Outcome: Progressing  7/8/2022 0608 by Gisele Golden RN  Outcome: Progressing     Problem: Skin/Tissue Integrity  Goal: Absence of new skin breakdown  Description: 1. Monitor for areas of redness and/or skin breakdown  2. Assess vascular access sites hourly  3. Every 4-6 hours minimum:  Change oxygen saturation probe site  4. Every 4-6 hours:  If on nasal continuous positive airway pressure, respiratory therapy assess nares and determine need for appliance change or resting period.   7/8/2022 1804 by Daryle Margarita, RN  Outcome: Progressing  7/8/2022 0608 by Gisele Golden RN  Outcome: Progressing     Problem: Safety - Adult  Goal: Free from fall injury  7/8/2022 1804 by Daryle Margarita, RN  Outcome: Progressing  Flowsheets (Taken 7/8/2022 1049)  Free From Fall Injury: Instruct family/caregiver on patient safety  7/8/2022 0608 by Stanislaw Mathew RN  Outcome: Progressing     Problem: ABCDS Injury Assessment  Goal: Absence of physical injury  7/8/2022 1804 by Tara Robin RN  Outcome: Progressing  Flowsheets (Taken 7/8/2022 1049)  Absence of Physical Injury: Implement safety measures based on patient assessment  7/8/2022 0608 by Stanislaw Mathew RN  Outcome: Progressing     Problem: Respiratory - Adult  Goal: Achieves optimal ventilation and oxygenation  7/8/2022 1804 by Tara Robin RN  Outcome: Progressing  7/8/2022 0608 by Stanislaw Mathew RN  Outcome: Progressing     Problem: Nutrition Deficit:  Goal: Optimize nutritional status  7/8/2022 1804 by Tara Robin RN  Outcome: Progressing  Flowsheets (Taken 7/8/2022 1515 by Collette Gentile, MS, RD, LD)  Nutrient intake appropriate for improving, restoring, or maintaining nutritional needs:   Assess nutritional status and recommend course of action   Monitor oral intake, labs, and treatment plans   Recommend appropriate diets, oral nutritional supplements, and vitamin/mineral supplements  7/8/2022 0608 by Stanislaw Mathew RN  Outcome: Progressing

## 2022-07-08 NOTE — PROGRESS NOTES
Comprehensive Nutrition Assessment    Type and Reason for Visit:  Reassess    Nutrition Recommendations/Plan:   1. Liberalize diet to Regular w/ GELY, to promote PO intake  2. Will send Magic Cup TID, Ensure Clear once daily at breakfast  3. Monitor/encourage PO intake  4. Will monitor weight, labs, PO intake as available     Malnutrition Assessment:  Malnutrition Status: At risk for malnutrition (Comment) (07/08/22 1521)    Context:  Acute Illness     Findings of the 6 clinical characteristics of malnutrition:  Energy Intake:  Mild decrease in energy intake (Comment)  Weight Loss:  Unable to assess     Body Fat Loss:  Unable to assess     Muscle Mass Loss:  Unable to assess    Fluid Accumulation:  Moderate to Severe Extremities,Generalized   Strength:  Not Performed    Nutrition Assessment:    Pt extubated 7/6. Pt not in room x 2 visits, discussed w/ pt daughter. Pt w/ poor PO intake yesterday, only small bites of meals. No intake at breakfast/lunch d/t dialysis. Per daughter, pt noted she still has pain in throat and does not want to eat hard foods. Pt does not like Ensure, but per daughter may enjoy Ensure Clear and Magic Cup, will send. Will liberalized diet to promote PO intake. Labs: Phos 2.2 mg/dL, K 3.9 mmol/L, Na 131 mg/dL. Nutrition Related Findings:    Labs/meds reviewed. LBM 7/4. +2 pitting generalized/RUE, +2/+3 pitting LUE, +1 pitting BUE edema noted. Wound Type: Pressure Injury,Stage III,Deep Tissue Injury,Diabetic Ulcer       Current Nutrition Intake & Therapies:    Average Meal Intake: 1-25%,0%  Average Supplements Intake: None Ordered  ADULT DIET; Regular; No Added Salt (3-4 gm)    Anthropometric Measures:  Height: 4' 11\" (149.9 cm)  Ideal Body Weight (IBW): 95 lbs (43 kg)    Admission Body Weight: 151 lb 1.7 oz (68.5 kg)  Current Body Weight: 157 lb 10.1 oz (71.5 kg) (7/8, bedscale), 165.9 % IBW.  Weight Source: Bed Scale  Current BMI (kg/m2): 31.8        Weight Adjustment For: No Adjustment                 BMI Categories: Obese Class 1 (BMI 30.0-34. 9)    Estimated Daily Nutrient Needs:  Energy Requirements Based On: Kcal/kg  Weight Used for Energy Requirements: Admission  Energy (kcal/day): 1700 to 1800 kcal/day  Weight Used for Protein Requirements: Ideal  Protein (g/day): 65 g pro/day  Method Used for Fluid Requirements: Other (Comment)  Fluid (ml/day): per MD    Nutrition Diagnosis:   · Inadequate oral intake related to pain as evidenced by intake 0-25% (recent intubation)      Nutrition Interventions:   Food and/or Nutrient Delivery: Modify Current Diet,Start Oral Nutrition Supplement  Nutrition Education/Counseling: No recommendation at this time  Coordination of Nutrition Care: Continue to monitor while inpatient  Plan of Care discussed with: -    Goals:  Previous Goal Met: Progress towards Goal(s) Declining  Goals: Meet at least 75% of estimated needs,within 7 days       Nutrition Monitoring and Evaluation:   Behavioral-Environmental Outcomes: None Identified  Food/Nutrient Intake Outcomes: Food and Nutrient Intake,Supplement Intake  Physical Signs/Symptoms Outcomes: Biochemical Data,Chewing or Swallowing,Fluid Status or Edema,Nutrition Focused Physical Findings,Skin,Weight    Discharge Planning:     Too soon to determine     Deandre Smith MS, RD, LD  Contact: [phone]

## 2022-07-08 NOTE — CARE COORDINATION
CM called and spoke with patients daughter, Cynthia Coleman, to discuss transitional planning. Cynthia Coleman states that she has not had a chance to look at the updated SNF list provided by CM yesterday. Writer requested patient look over list and call CM this afternoon when she has her choices. 1419- Patients daughter Cynthia Coleman called unit and provided SNF choices. 1. [place] 2. [facility]  3. [facility]  4. [facility]. Referrals sent to [facility] and [facility].

## 2022-07-09 LAB
ABSOLUTE EOS #: 0.05 K/UL (ref 0–0.44)
ABSOLUTE IMMATURE GRANULOCYTE: 0.42 K/UL (ref 0–0.3)
ABSOLUTE LYMPH #: 0.98 K/UL (ref 1.1–3.7)
ABSOLUTE MONO #: 1.24 K/UL (ref 0.1–1.2)
ANION GAP SERPL CALCULATED.3IONS-SCNC: 9 MMOL/L (ref 9–17)
BASOPHILS # BLD: 1 % (ref 0–2)
BASOPHILS ABSOLUTE: 0.07 K/UL (ref 0–0.2)
BUN BLDV-MCNC: 17 MG/DL (ref 8–23)
CALCIUM SERPL-MCNC: 8.5 MG/DL (ref 8.6–10.4)
CHLORIDE BLD-SCNC: 101 MMOL/L (ref 98–107)
CO2: 26 MMOL/L (ref 20–31)
CREAT SERPL-MCNC: 1.48 MG/DL (ref 0.5–0.9)
EOSINOPHILS RELATIVE PERCENT: 1 % (ref 1–4)
GFR AFRICAN AMERICAN: 42 ML/MIN
GFR NON-AFRICAN AMERICAN: 35 ML/MIN
GFR SERPL CREATININE-BSD FRML MDRD: ABNORMAL ML/MIN/{1.73_M2}
GLUCOSE BLD-MCNC: 116 MG/DL (ref 70–99)
HCT VFR BLD CALC: 28.4 % (ref 36.3–47.1)
HEMOGLOBIN: 8.5 G/DL (ref 11.9–15.1)
IMMATURE GRANULOCYTES: 5 %
LACTIC ACID, SEPSIS WHOLE BLOOD: 1.4 MMOL/L (ref 0.5–1.9)
LYMPHOCYTES # BLD: 11 % (ref 24–43)
MCH RBC QN AUTO: 30.6 PG (ref 25.2–33.5)
MCHC RBC AUTO-ENTMCNC: 29.9 G/DL (ref 28.4–34.8)
MCV RBC AUTO: 102.2 FL (ref 82.6–102.9)
MONOCYTES # BLD: 13 % (ref 3–12)
NRBC AUTOMATED: 0.2 PER 100 WBC
PARTIAL THROMBOPLASTIN TIME: 76.4 SEC (ref 20.5–30.5)
PARTIAL THROMBOPLASTIN TIME: 82.7 SEC (ref 20.5–30.5)
PDW BLD-RTO: 17 % (ref 11.8–14.4)
PLATELET # BLD: 196 K/UL (ref 138–453)
PMV BLD AUTO: 9.6 FL (ref 8.1–13.5)
POTASSIUM SERPL-SCNC: 3.7 MMOL/L (ref 3.7–5.3)
RBC # BLD: 2.78 M/UL (ref 3.95–5.11)
RBC # BLD: ABNORMAL 10*6/UL
SEG NEUTROPHILS: 69 % (ref 36–65)
SEGMENTED NEUTROPHILS ABSOLUTE COUNT: 6.51 K/UL (ref 1.5–8.1)
SODIUM BLD-SCNC: 136 MMOL/L (ref 135–144)
WBC # BLD: 9.3 K/UL (ref 3.5–11.3)

## 2022-07-09 PROCEDURE — 97530 THERAPEUTIC ACTIVITIES: CPT

## 2022-07-09 PROCEDURE — 94640 AIRWAY INHALATION TREATMENT: CPT

## 2022-07-09 PROCEDURE — 2500000003 HC RX 250 WO HCPCS: Performed by: STUDENT IN AN ORGANIZED HEALTH CARE EDUCATION/TRAINING PROGRAM

## 2022-07-09 PROCEDURE — 2700000000 HC OXYGEN THERAPY PER DAY

## 2022-07-09 PROCEDURE — 94761 N-INVAS EAR/PLS OXIMETRY MLT: CPT

## 2022-07-09 PROCEDURE — 99232 SBSQ HOSP IP/OBS MODERATE 35: CPT | Performed by: FAMILY MEDICINE

## 2022-07-09 PROCEDURE — 99232 SBSQ HOSP IP/OBS MODERATE 35: CPT | Performed by: INTERNAL MEDICINE

## 2022-07-09 PROCEDURE — 6360000002 HC RX W HCPCS: Performed by: INTERNAL MEDICINE

## 2022-07-09 PROCEDURE — A4216 STERILE WATER/SALINE, 10 ML: HCPCS | Performed by: STUDENT IN AN ORGANIZED HEALTH CARE EDUCATION/TRAINING PROGRAM

## 2022-07-09 PROCEDURE — 85025 COMPLETE CBC W/AUTO DIFF WBC: CPT

## 2022-07-09 PROCEDURE — 2580000003 HC RX 258: Performed by: STUDENT IN AN ORGANIZED HEALTH CARE EDUCATION/TRAINING PROGRAM

## 2022-07-09 PROCEDURE — 6360000002 HC RX W HCPCS: Performed by: STUDENT IN AN ORGANIZED HEALTH CARE EDUCATION/TRAINING PROGRAM

## 2022-07-09 PROCEDURE — 80048 BASIC METABOLIC PNL TOTAL CA: CPT

## 2022-07-09 PROCEDURE — 36415 COLL VENOUS BLD VENIPUNCTURE: CPT

## 2022-07-09 PROCEDURE — 97110 THERAPEUTIC EXERCISES: CPT

## 2022-07-09 PROCEDURE — 87040 BLOOD CULTURE FOR BACTERIA: CPT

## 2022-07-09 PROCEDURE — 99233 SBSQ HOSP IP/OBS HIGH 50: CPT | Performed by: INTERNAL MEDICINE

## 2022-07-09 PROCEDURE — 83605 ASSAY OF LACTIC ACID: CPT

## 2022-07-09 PROCEDURE — 2060000000 HC ICU INTERMEDIATE R&B

## 2022-07-09 PROCEDURE — 85730 THROMBOPLASTIN TIME PARTIAL: CPT

## 2022-07-09 PROCEDURE — 2500000003 HC RX 250 WO HCPCS: Performed by: FAMILY MEDICINE

## 2022-07-09 RX ORDER — BISACODYL 10 MG
10 SUPPOSITORY, RECTAL RECTAL DAILY PRN
Status: DISCONTINUED | OUTPATIENT
Start: 2022-07-09 | End: 2022-07-18 | Stop reason: HOSPADM

## 2022-07-09 RX ORDER — METOPROLOL TARTRATE 5 MG/5ML
5 INJECTION INTRAVENOUS EVERY 6 HOURS
Status: DISCONTINUED | OUTPATIENT
Start: 2022-07-09 | End: 2022-07-10

## 2022-07-09 RX ADMIN — ANTI-FUNGAL POWDER MICONAZOLE NITRATE TALC FREE: 1.42 POWDER TOPICAL at 09:21

## 2022-07-09 RX ADMIN — METOPROLOL TARTRATE 5 MG: 1 INJECTION, SOLUTION INTRAVENOUS at 10:37

## 2022-07-09 RX ADMIN — FENTANYL CITRATE 50 MCG: 50 INJECTION, SOLUTION INTRAMUSCULAR; INTRAVENOUS at 21:18

## 2022-07-09 RX ADMIN — METOPROLOL TARTRATE 5 MG: 1 INJECTION, SOLUTION INTRAVENOUS at 21:19

## 2022-07-09 RX ADMIN — ALBUTEROL SULFATE 2.5 MG: 2.5 SOLUTION RESPIRATORY (INHALATION) at 03:02

## 2022-07-09 RX ADMIN — SODIUM CHLORIDE, PRESERVATIVE FREE 10 MG: 5 INJECTION INTRAVENOUS at 09:20

## 2022-07-09 RX ADMIN — ANTI-FUNGAL POWDER MICONAZOLE NITRATE TALC FREE: 1.42 POWDER TOPICAL at 20:48

## 2022-07-09 RX ADMIN — ALBUTEROL SULFATE 2.5 MG: 2.5 SOLUTION RESPIRATORY (INHALATION) at 15:29

## 2022-07-09 RX ADMIN — METOPROLOL TARTRATE 5 MG: 1 INJECTION, SOLUTION INTRAVENOUS at 17:53

## 2022-07-09 RX ADMIN — ALBUTEROL SULFATE 2.5 MG: 2.5 SOLUTION RESPIRATORY (INHALATION) at 19:45

## 2022-07-09 ASSESSMENT — ENCOUNTER SYMPTOMS
BLOOD IN STOOL: 0
TROUBLE SWALLOWING: 1
ABDOMINAL PAIN: 0
WHEEZING: 0
DIARRHEA: 0
VOMITING: 0
NAUSEA: 0
CHEST TIGHTNESS: 0
CONSTIPATION: 0
COUGH: 1
SHORTNESS OF BREATH: 0

## 2022-07-09 NOTE — PROGRESS NOTES
Renal Progress Note    Patient :  Angel Friedman; 66 y.o. MRN# [id number]  Location:  2019/2019-01  Attending:  Joe Kay MD  Admit Date:  6/27/2022   Hospital Day: 12      Subjective:     Patient was seen and examined. No acute events overnight  Patient resting comfortably in bed this morning  Had regular hemodialysis yesterday ran for 180 minutes and got about 1.5 L removed.   Urine output 175 mL in 24 hours  Right internal jugular vein tunneled dialysis catheter placed 7/8/22  Pt complains of HA this morning   Cough   Afebrile  Blood pressure stable 134/54    Outpatient Medications:     Medications Prior to Admission: ferrous sulfate (IRON 325) 325 (65 Fe) MG tablet, Take 1 tablet by mouth daily (with breakfast)  apixaban (ELIQUIS) 2.5 MG TABS tablet, Take 1 tablet by mouth 2 times daily  apixaban (ELIQUIS) 2.5 MG TABS tablet, Take 1 tablet by mouth 2 times daily  ferrous sulfate (IRON 325) 325 (65 Fe) MG tablet, Take 1 tablet by mouth daily (with breakfast)  fluticasone-salmeterol (ADVAIR HFA) 115-21 MCG/ACT inhaler, Inhale 2 puffs into the lungs 2 times daily  metoprolol tartrate (LOPRESSOR) 25 MG tablet, Take 1 tablet by mouth 2 times daily  aspirin EC 81 MG EC tablet, Take 1 tablet by mouth daily  hydrALAZINE (APRESOLINE) 25 MG tablet, Take 1 tablet by mouth 3 times daily  albuterol sulfate HFA (PROAIR HFA) 108 (90 Base) MCG/ACT inhaler, Inhale 2 puffs into the lungs every 6 hours as needed for Wheezing or Shortness of Breath (cough)  metoprolol tartrate (LOPRESSOR) 25 MG tablet, Take 1 tablet by mouth 2 times daily  aspirin EC 81 MG EC tablet, Take 1 tablet by mouth daily  hydrALAZINE (APRESOLINE) 25 MG tablet, Take 1 tablet by mouth 3 times daily  albuterol sulfate HFA (PROAIR HFA) 108 (90 Base) MCG/ACT inhaler, Inhale 2 puffs into the lungs every 6 hours as needed for Wheezing or Shortness of Breath (cough)  fluticasone-salmeterol (ADVAIR HFA) 115-21 MCG/ACT inhaler, Inhale 2 puffs into the lungs 2 times daily Maintenance inhaler  amLODIPine (NORVASC) 10 MG tablet, Take 1 tablet by mouth nightly  Blood Pressure KIT, Diagnosis: HTN. Needs to check blood pressure 1-2 times a day until stable, then once a day. Goal blood pressure less than 135/85, and above 110/60. vitamin D (ERGOCALCIFEROL) 1.25 MG (50468 UT) CAPS capsule, Take 1 capsule by mouth once a week Sundays  amiodarone (CORDARONE) 200 MG tablet, Take 1 tablet by mouth daily  Nebulizers (COMPRESSOR/NEBULIZER) MISC, Nebulizer with compressor. Disposable Med Nebs 2 /month. Reusable Med Nebs 1 per 6 months. Aerosol Mask 1 per month. Replacement Filters 2 per month. All other related supplies as needed per month. Medications being used: Albuterol . 083 unit dose vial. Frequency: every 6 hrs x 4/day . Length of Need: 1 (Patient not taking: Reported on 6/17/2022)  albuterol (PROVENTIL) (2.5 MG/3ML) 0.083% nebulizer solution, Take 3 mLs by nebulization every 6 hours as needed for Wheezing or Shortness of Breath (Patient not taking: Reported on 6/17/2022)  Misc. Devices (ADJUST FOLD CANE/YORK HANDLE) MISC, Use daily for walking  Handicap Placard MISC, by Does not apply route Expires on 12/30/25  atorvastatin (LIPITOR) 40 MG tablet, Take 1 tablet by mouth once daily  desloratadine (CLARINEX) 5 MG tablet, Take 1 tablet by mouth once daily  FreeStyle Lancets MISC, 1 each by Does not apply route daily Patient needs to contact office before any further refills will be approved  magnesium oxide (MAG-OX) 400 (241.3 Mg) MG TABS tablet, Take 1 tablet by mouth twice daily  miconazole (MICOTIN) 2 % powder, Apply topically 2 times daily UNDER THE SKIN FOLDS LONG TERM  Multiple Vitamins-Minerals (THERAPEUTIC MULTIVITAMIN-MINERALS) tablet, Take 1 tablet by mouth daily  blood glucose test strips (FREESTYLE LITE) strip, 1 each by In Vitro route daily As needed.   Blood Pressure KIT, 1 kit by Does not apply route three times daily (Patient not taking: Reported on 6/17/2022)  blood glucose monitor kit and supplies, 1 kit by Other route three times daily Dispense Butterfly Elite CBG Device    Current Medications:     Scheduled Meds:    metoprolol  5 mg IntraVENous Q6H    [Held by provider] metoprolol tartrate  25 mg Oral BID    dextromethorphan-guaiFENesin  10 mL Oral TID    epoetin rick-epbx  5,000 Units IntraVENous Q MWF    docusate  100 mg Oral Daily    famotidine (PEPCID) injection  10 mg IntraVENous Daily    levothyroxine  25 mcg Oral Daily    lidocaine 1 % injection  5 mL IntraDERmal Once    sodium chloride flush  5-40 mL IntraVENous 2 times per day    sodium polystyrene  15 g Oral Once    amiodarone  100 mg Oral Daily    rocuronium  0.6 mg/kg IntraVENous Once    albuterol  2.5 mg Nebulization Q6H    sodium chloride flush  5-40 mL IntraVENous 2 times per day    aspirin EC  81 mg Oral Daily    atorvastatin  40 mg Oral Daily    magnesium oxide  400 mg Oral BID    miconazole   Topical BID    ferrous sulfate  325 mg Oral Daily with breakfast     Continuous Infusions:    sodium chloride      sodium chloride      sodium chloride      dextrose      sodium chloride 75 mL/hr at 07/06/22 2000    heparin (PORCINE) Infusion 20 Units/kg/hr (07/08/22 1603)     PRN Meds:  LORazepam, sodium chloride, albumin human, sodium chloride flush, sodium chloride, sodium chloride, fentanNYL, heparin (porcine), heparin (porcine), glucose, dextrose bolus **OR** dextrose bolus, glucagon (rDNA), dextrose, sodium chloride flush, sodium chloride, ondansetron **OR** ondansetron, polyethylene glycol, acetaminophen **OR** acetaminophen, heparin (porcine), heparin (porcine), morphine    Input/Output:       I/O last 3 completed shifts: In: 337.6 [I.V.:107.6]  Out: 1800 .       Patient Vitals for the past 96 hrs (Last 3 readings):   Weight   07/08/22 1328 153 lb 7 oz (69.6 kg)   07/08/22 1014 157 lb 10.1 oz (71.5 kg)   07/07/22 0600 148 lb 13 oz (67.5 kg)       Vital Signs:   Temperature:  Temp: 98.7 °F (37.1 °C)  TMax:   Temp (24hrs), Av.7 °F (36.5 °C), Min:97.1 °F (36.2 °C), Max:98.7 °F (37.1 °C)    Respirations:  Resp: 20  Pulse:   Heart Rate: 84  BP:    BP: (!) 134/54  BP Range: Systolic (39JON), DVS:225 , Min:121 , HXL:237       Diastolic (07ZNR), GBR:53, Min:48, Max:99      Physical Examination:     General:  AAO x 3, speaking in full sentences, no accessory muscle use. HEENT: Atraumatic, normocephalic, no throat congestion, moist mucosa. Eyes:   Pupils equal, round and reactive to light, EOMI. Neck:   Supple  Chest:   Bilateral vesicular breath sounds, no rales or wheezes. Cardiac:  S1 S2 RR, no murmurs, gallops or rubs. Abdomen: Soft, non-tender, no masses or organomegaly, BS audible. :   No suprapubic or flank tenderness. Neuro:  AAO x 3, No FND. SKIN:  No rashes, good skin turgor. Extremities:  No edema. Labs:       Recent Labs     22  1826 22  2133 22  0512   WBC 8.5  --  10.2  --  9.3   RBC 2.69*  --  2.78*  --  2.78*   HGB 8.1*   < > 8.4* 8.2* 8.5*   HCT 27.5*   < > 27.8* 27.5* 28.4*   .2  --  100.0  --  102.2   MCH 30.1  --  30.2  --  30.6   MCHC 29.5  --  30.2  --  29.9   RDW 17.3*  --  17.2*  --  17.0*   *  --  173  --  196   MPV 9.7  --  9.8  --  9.6    < > = values in this interval not displayed.       BMP:   Recent Labs     22  04222  0512    131* 136   K 3.7 3.9 3.7    98 101   CO2 27 27 26   BUN 23 31* 17   CREATININE 1.35* 1.97* 1.48*   GLUCOSE 111* 150* 116*   CALCIUM 7.8* 8.1* 8.5*     YRIS:      Lab Results   Component Value Date/Time    YRIS NEGATIVE 10/27/2020 04:37 PM     SPEP:  Lab Results   Component Value Date/Time    PROT 5.3 2022 02:59 PM    ALBCAL 2.5 2022 03:00 PM    ALBPCT 48 2022 03:00 PM    LABALPH 0.3 2022 03:00 PM    LABALPH 1.2 2022 03:00 PM    A1PCT 6 2022 03:00 PM    A2PCT 23 2022 03:00 PM    LABBETA 0.7 06/01/2022 03:00 PM    BETAPCT 13 06/01/2022 03:00 PM    GAMGLOB 0.5 06/01/2022 03:00 PM    GGPCT 10 06/01/2022 03:00 PM    PATH ELECTRONICALLY SIGNED. Lili Currie M.D. 06/01/2022 03:00 PM    PATH ELECTRONICALLY SIGNED. Lili Currie M.D. 06/01/2022 03:00 PM     UPEP:     Lab Results   Component Value Date/Time    LABPE  10/27/2020 04:35 PM     ELEVATED PROTEIN CONCENTRATION. MOST SERUM PROTEINS ARE DETECTED IN THIS URINE.   USUALLY OBSERVED WITH MARKEDLY INCREASED NON-SELECTIVE GLOMERULAR PERMEABILITY (i.e. SEVERE GLOMERULAR DISEASE), CONTAMINATION OF     C3:     Lab Results   Component Value Date/Time    C3 147 10/27/2020 04:37 PM     C4:     Lab Results   Component Value Date/Time    C4 29 10/27/2020 04:37 PM     MPO ANCA:     Lab Results   Component Value Date/Time    MPO 5 10/27/2020 04:37 PM     PR3 ANCA:     Lab Results   Component Value Date/Time    PR3 16 10/27/2020 04:37 PM     Hep BsAg:         Lab Results   Component Value Date/Time    HEPBSAG NONREACTIVE 06/28/2022 02:56 PM     Hep C AB:          Lab Results   Component Value Date/Time    HEPCAB NONREACTIVE 06/28/2022 02:56 PM       Urinalysis/Chemistries:      Lab Results   Component Value Date/Time    NITRU NEGATIVE 06/27/2022 06:35 PM    COLORU Dark Yellow 06/27/2022 06:35 PM    PHUR 5.5 06/27/2022 06:35 PM    WBCUA 20 TO 50 06/27/2022 06:35 PM    RBCUA 20 TO 50 06/27/2022 06:35 PM    MUCUS 1+ 06/27/2022 06:35 PM    TRICHOMONAS NOT REPORTED 02/07/2022 02:42 AM    YEAST NOT REPORTED 02/07/2022 02:42 AM    BACTERIA FEW 06/12/2022 09:33 PM    SPECGRAV 1.030 06/27/2022 06:35 PM    LEUKOCYTESUR SMALL 06/27/2022 06:35 PM    UROBILINOGEN Normal 06/27/2022 06:35 PM    [address] NEGATIVE 06/27/2022 06:35 PM    GLUCOSEU 1+ 06/27/2022 06:35 PM    KETUA TRACE 06/27/2022 06:35 PM    AMORPHOUS NOT REPORTED 02/07/2022 02:42 AM     Urine Sodium:     Lab Results   Component Value Date/Time    SLOAN <20 06/08/2022 09:52 PM      Urine Creatinine:     Lab Results   Component Value Date/Time    LABCREA 63.0 06/08/2022 09:52 PM     Radiology:     Reviewed. Assessment:     1. JOY currently HD dependent, likely secondary to ATN from hypotension requiring pressor support vs progression from underlying diabetic nephropathy, getting dialysis as per MWF schedule. Hemodialysis initiated on 6/28/2022. Right IJ tunneled cath placed 7/8/22  2. Hyperkalemia likely from progressive decline in renal function, now improved with dialysis. 3. Nephrotic range proteinuria likely due to diabetic nephropathy. 4. Diabetes type 2.  5. Extubated 7/6/22. 6. Nonhealing lower extremity ulcers. 7. Hypotension did require pressor support this admission. 8. Fluid overload  9. Drop in hemoglobin stable with no overt signs of bleeding. Plan:   1. Continue with HD as per MWF schedule. No acute need for dialysis today. 2. Monitor strict I/Os and renal function for signs of improvement. 3. Bladder scan every shift and St. Cath if >250 cc. 4. BMP in AM.  5.  working towards setting up outpatient dialysis spot. 6. Will follow. Nutrition   Please ensure that patient is on a renal diet/TF. Avoid nephrotoxic drugs/contrast exposure. Juana Pereira MD    Attending Physician Statement  I have discussed the care of this patient, including pertinent history and exam findings, with the Resident/CNP. I have reviewed and edited the key elements of all parts of the encounter with the Resident/CNP. I agree with the assessment, plan and orders as documented by the Resident/CNP. Vamsi Lynch MD   Nephrology [address] [place] [address] Drive    This note is created with the assistance of a speech-recognition program. While intending to generate a document that actually reflects the content of the visit, no guarantees can be provided that every mistake has been identified and corrected by editing.

## 2022-07-09 NOTE — PROGRESS NOTES
PULMONARY & CRITICAL CARE MEDICINE PROGRESS NOTE     Patient:  Dane Weaver  MRN: [id number]  Admit date: 6/27/2022  Primary Care Physician: Governor Ashu MD  Consulting Physician: Kaylan Barreto MD  CODE Status: Full Code  LOS: 12     SUBJECTIVE     Chief Complaint/ Reason for consult:  Admitted to ICU with bradycardia    Hospital Course: The patient is a 66 y.o. female with prior history of of CKD stage IV( baseline creatinine 1.8-2), chronic diastolic CHF, hypertension, CAD s/p CABG in  in 2003 and occluded SVG to OM 2 and cath in 2006, COPD, type 2 diabetes, A. fib on amiodarone and Eliquis  Presented to ED on 6/27/2021 with complaints of shortness of breath hypoxia improved on 90s, and fatigue. Was found to be bradycardic heart rate in 35 by EMS and was placed on transcutaneous pacing and received atropine. She was subsequently started on epinephrine drip and admitted to the ICU. X-ray consistent with pulmonary edema. Cardiology was consulted and switched to dopamine drip, and Eliquis was switched to heparin drip. Was also noted to be hyperkalemic and started on hemodialysis sec to worsening CKD on 06/28/22. Neurology was consulted for encephalopathy : noted to have right anterior temporal fossa meningioma with possible edema. MRI considered nonemergent and deferred.,  ID was consulted for leukocytosis and b/l LE wounds which are chronic(sees wound care clinic outpatient) and Right gluteal /coccygeal ulcerations. Started on Cefepime. -On 6/28/2022 she had progressively worsening mentation with worsening hypoxia and hypercapnia she was intubated and started on HD. Heparin was held on 6/29-07/01 secondary to oozing at right IJ dialysis catheter site. Patient was continued on empiric antibiotics through 7/1/22(urine and blood cx NG), dialysis/UF and subsequently extubated on 07/06/22. Received 1u PRBC 7/7/22 for hb 7.0. transferred to floor on 7/8/22.      Interval History:  07/09/22  Pt was seen and examined at bedside. S/p IR guided tunnel catheter placement. Lethargic but arousable. Resting comfortably in the bed. Saturating appropriately at 98% on 2L NC   Plan for swallow study today, is at high risk of aspiration  Vitals stable. Labs reviewed. No acute events overnight. Review Of Systems:  Review of Systems  Unable to obtain due to mentation    OBJECTIVE     PaO2/FiO2 RATIO:  No results for input(s): POCPO2 in the last 72 hours. FiO2 : 28 %     VITAL SIGNS:   LAST:  BP (!) 134/54   Pulse 84   Temp 98.7 °F (37.1 °C) (Axillary)   Resp 20   Ht 4' 11\" (1.499 m)   Wt 153 lb 7 oz (69.6 kg)   SpO2 100%   BMI 30.99 kg/m²   8-24 HR RANGE:  TEMP Temp  Av.7 °F (36.5 °C)  Min: 97.1 °F (36.2 °C)  Max: 98.7 °F (98.0 °C)   BP Systolic (22XHS), QPY:627 , Min:121 , EJL:708      Diastolic (48HLS), CLR:14, Min:48, Max:99     PULSE Pulse  Av.9  Min: 75  Max: 94   RR No data recorded   O2 SAT No data recorded   OXYGEN DELIVERY No data recorded        Systemic Examination:   Physical Exam -  Constitutional:  Lethargic but arousable  Mental Status:  Oriented to person, place and time and normal affect. Lungs:  Bilateral air entry present, lung fields clear. Normal effort. Heart:  Regular rate and rhythm, no murmur. Abdomen:  Soft, nontender, nondistended, normal bowel sounds. Extremities:  No edema, redness, tenderness in the calves. Skin:  Warm, dry, no gross lesions or rashes.     DATA REVIEW     Medications: Current Inpatient  Scheduled Meds:   metoprolol  5 mg IntraVENous Q6H    [Held by provider] metoprolol tartrate  25 mg Oral BID    dextromethorphan-guaiFENesin  10 mL Oral TID    epoetin rick-epbx  5,000 Units IntraVENous Q MWF    docusate  100 mg Oral Daily    famotidine (PEPCID) injection  10 mg IntraVENous Daily    levothyroxine  25 mcg Oral Daily    lidocaine 1 % injection  5 mL IntraDERmal Once    sodium chloride flush  5-40 mL IntraVENous 2 times per day    sodium polystyrene  15 g Oral Once    amiodarone  100 mg Oral Daily    rocuronium  0.6 mg/kg IntraVENous Once    albuterol  2.5 mg Nebulization Q6H    sodium chloride flush  5-40 mL IntraVENous 2 times per day    aspirin EC  81 mg Oral Daily    atorvastatin  40 mg Oral Daily    magnesium oxide  400 mg Oral BID    miconazole   Topical BID    ferrous sulfate  325 mg Oral Daily with breakfast     Continuous Infusions:   sodium chloride      sodium chloride      sodium chloride      dextrose      sodium chloride 75 mL/hr at 07/06/22 2000    heparin (PORCINE) Infusion 17.997 Units/kg/hr (07/09/22 1409)       INPUT/OUTPUT:  In: 230   Out: 1800        LABS:  ABGs:   No results for input(s): POCPH, POCPCO2, POCPO2, POCHCO3, NEGM4TKY in the last 72 hours. CBC:   Recent Labs     07/07/22 0447 07/07/22  1826 07/08/22  0421 07/08/22  2133 07/09/22  0512   WBC 8.5  --  10.2  --  9.3   HGB 8.1* 9.4* 8.4* 8.2* 8.5*   HCT 27.5* 31.5* 27.8* 27.5* 28.4*   .2  --  100.0  --  102.2   *  --  173  --  196   LYMPHOPCT 19*  --  8*  --  11*   RBC 2.69*  --  2.78*  --  2.78*   MCH 30.1  --  30.2  --  30.6   MCHC 29.5  --  30.2  --  29.9   RDW 17.3*  --  17.2*  --  17.0*     CRP:   No results for input(s): CRP in the last 72 hours. LDH:   No results for input(s): LDH in the last 72 hours. BMP:   Recent Labs     07/07/22 0447 07/08/22  0421 07/09/22  0512    131* 136   K 3.7 3.9 3.7    98 101   CO2 27 27 26   BUN 23 31* 17   CREATININE 1.35* 1.97* 1.48*   GLUCOSE 111* 150* 116*     Liver Function Test:   No results for input(s): PROT, LABALBU, ALT, AST, GGT, ALKPHOS, BILITOT in the last 72 hours. Coagulation Profile:   Recent Labs     07/08/22  0055 07/08/22  0823 07/08/22  1716 07/08/22  2133 07/09/22  0745   INR  --  1.0  --   --   --    PROTIME  --  10.9  --   --   --    APTT 43.6* 57.2* 33.8* 65.5* 76.4*     D-Dimer:  No results for input(s): DDIMER in the last 72 hours.   Lactic Acid:  No results for input(s): LACTA in the last 72 hours. Cardiac Enzymes:  No results for input(s): CKTOTAL, CKMB, CKMBINDEX, TROPONINI in the last 72 hours. Invalid input(s): TROPONIN, HSTROP  BNP/ProBNP:   No results for input(s): BNP, PROBNP in the last 72 hours. Triglycerides:  No results for input(s): TRIG in the last 72 hours. Microbiology:  Urine Culture:  No components found for: CURINE  Blood Culture:  No components found for: CBLOOD, CFUNGUSBL  Sputum Culture:  No components found for: CSPUTUM  Recent Labs     07/09/22  [address] [facility],[place] [address] . BLOOD  . BLOOD   CULTURE NO GROWTH <24 HRS  NO GROWTH <24 HRS     Recent Labs     07/09/22  0512   SPECDESC . BLOOD  . BLOOD   CULTURE NO GROWTH <24 HRS  NO GROWTH <24 HRS        Pathology:    Radiology Reports:  IR TUNNELED CVC PLACE WO SQ PORT/PUMP > 5 YEARS   Final Result   Successful ultrasound and fluoroscopy guided tunneled catheter placement . Okay to use. XR CHEST PORTABLE   Final Result   Mild increased perihilar edema, mild increased perihilar opacification,   likely edema and CHF. Persistent small effusions and bibasilar atelectasis. Satisfactory position of endotracheal tube. XR CHEST PORTABLE   Final Result   Stable support lines      Slight progression in now moderate diffuse bilateral interstitial infiltrates   related to increasing edema and/or infection with moderate bilateral pleural   effusions         XR CHEST PORTABLE   Final Result   Scattered infiltrates with bilateral effusions. Support tubes as described above. US RENAL COMPLETE   Final Result   Chronic renal disease. Small renal cysts. 1.4 cm right renal stone. No   hydronephrosis. IR NONTUNNELED VASCULAR CATHETER > 5 YEARS   Final Result   Successful ultrasound and fluoroscopy guided 13.5 Kinyarwanda by 15 cm non   tunneled hemodialysis catheter placement. Ready for use. XR CHEST PORTABLE   Final Result   1.   Endotracheal tube with the tip just entering the right mainstem bronchus. Recommend retraction by 4 cm. 2.  Right lower lobe airspace consolidation and small bilateral pleural   effusions. These findings were discussed with the inpatient nurse Harmeet Morillo at 1637   hours on 28 June 2022         XR ABDOMEN FOR NG/OG/NE TUBE PLACEMENT   Final Result   NG tube position, as detailed         XR CHEST PORTABLE   Final Result   Slight improvement in still moderate diffuse bilateral interstitial   infiltrates related to improving pulmonary edema with decreasing still   moderate right and mild to moderate left pleural effusions         CT HEAD WO CONTRAST   Final Result   No acute intracranial hemorrhage or hydrocephalus. Background mild chronic   small vessel white matter changes with remote lacunar insults in the right   caudate and left basal ganglia, unchanged from prior. 2.6 cm anterior right temporal meningioma with suggested mild increase in the   vasogenic edema along the anterior right temporal lobe relative to 4 months   prior. This could be further assessed with a contrast-enhanced brain MRI. XR CHEST PORTABLE   Final Result   Mixed interstitial and alveolar opacities throughout both lungs, increased   from 3 weeks prior. Correlate for edema or contributory infection. Bilateral small left greater than right pleural effusions which are also   increased from 3 weeks prior. Unchanged cardiomegaly.          FL MODIFIED BARIUM SWALLOW W VIDEO    (Results Pending)        Echocardiogram:   Results for orders placed during the hospital encounter of 02/06/22    ECHO Complete 2D W Doppler W Color    Narrative  [facility]    Transthoracic Echocardiography Report (TTE)    Patient Name [id number]       Date of Study                 02/07/2022  María Elena Stalker    Date of      1950  Gender                        Female  Birth    Age          66 year(s)  Race                              Room Number  9849 Height:                       58.66 inch, 149 cm    Corporate ID [id number]    Weight:                       132 pounds, 60 kg  #    Patient Acct [de-identified]   BSA:           1.54 m^2       BMI:      27.03  #                                                                kg/m^2    MR #         [id number]      Sonographer                   Saira Price    Accession #  [id number]  Interpreting Physician        Philly Arevalo    Fellow                   Referring Nurse Practitioner    Interpreting             Referring Physician           Jann Duong  Fellow                                                 Lian Nolen    Type of Study    TTE procedure:2D Echocardiogram, M-Mode, Doppler, Color Doppler. Procedure Date  Date: 02/07/2022 Start: 11:37 AM    Study Location: [address] [place] [address]  Technical Quality: Fair visualization    Indications:Congestive heart failure. History / Tech. Comments:  CABG DM CKD COPD HTN AF    Patient Status: Inpatient    Height: 58.66 inches Weight: 132.3 pounds BSA: 1.54 m^2 BMI: 27.03 kg/m^2    Rhythm: Within normal limits    Allergies  - Lisinopril. - Keflex. - *Unlisted:(Aleve,Claratin, Pioglitazone). CONCLUSIONS    Summary  Normal left ventricle size and function with an estimated EF > 55%. No segmental wall motion abnormalities seen. Moderate left ventricular hypertrophy. Normal right ventricular size and function. Left atrial dilatation. Aortic valve is trileaflet. Mild aortic stenosis. Mild-moderate aortic insufficiency. Normal aortic root dimension. Mitral annular calcification is seen. Mild mitral regurgitation. Mild tricuspid regurgitation. Normal right ventricular systolic pressure. No significant pericardial effusion is seen. Pleural effusion present.     Signature  ----------------------------------------------------------------------------  Electronically signed by Saira Price(Sonographer) on 02/07/2022 12:16  PM  ----------------------------------------------------------------------------    ----------------------------------------------------------------------------  Electronically signed by Nathan Morales(Interpreting physician) on  2022 12:19 PM  ----------------------------------------------------------------------------  FINDINGS  Left Atrium  Left atrial dilatation. Left Ventricle  Normal left ventricle size and function with an estimated EF > 55%. No segmental wall motion abnormalities seen. Moderate left ventricular hypertrophy. Right Atrium  Right atrial dilatation. Right Ventricle  Normal right ventricular size and function. Mitral Valve  Mitral annular calcification is seen. Mild mitral regurgitation. Aortic Valve  Aortic valve is trileaflet. Mild aortic stenosis. Mild-moderate aortic insufficiency. Tricuspid Valve  Normal tricuspid valve structure and function. Mild tricuspid regurgitation. Normal right ventricular systolic pressure. Pulmonic Valve  The pulmonic valve is normal in structure. Mild pulmonic insufficiency. Pericardial Effusion  No significant pericardial effusion is seen. Pleural Effusion  Pleural effusion present. Miscellaneous  Normal aortic root dimension. E/e' average 16.5  IVC normal diameter & inspiratory collapse indicating normal RA filling  pressure .     M-mode / 2D Measurements & Calculations:    LVIDd:4 cm(3.7 - 5.6 cm)         Diastolic MEDOGC:48 ml  ADQMV:0.55 cm(2.2 - 4.0 cm)      Systolic FPLKMB:07.8 ml  JIXH:5.74 cm(0.6 - 1.1 cm)       Aortic Root:2.9 cm(2.0 - 3.7 cm)  LVPWd:1.53 cm(0.6 - 1.1 cm)      LA Dimension: 5.4 cm(1.9 - 4.0 cm)  Fractional Shortenin %       LA volume/Index: 56.5 ml /37m^2  Calculated LVEF (%): 73.75 %     LVOT:2.2 cm    Mitral:                                 Aortic    Valve Area (P1/2-Time): 2.68 cm^2       Peak Velocity: 2.09 m/s  Peak E-Wave: 1.24 m/s                   Mean Velocity: 1.34 m/s  Peak A-Wave: 0.92 m/s Peak Gradient: 17.47 mmHg  E/A Ratio: 1.35                         Mean Gradient: 9 mmHg  Peak Gradient: 6.15 mmHg                AI P1/2t: 325 msec  Mean Gradient: 3 mmHg  Deceleration Time: 169 msec             Area (continuity): 1.84 cm^2  P1/2t: 82 msec                          AV VTI: 51.2 cm    Area (continuity): 2.32 cm^2  Mean Velocity: 0.85 m/s    Tricuspid:    Peak TR Velocity: 3.03 m/s  Peak TR Gradient: 36.7236 mmHg    Septal Wall E' velocity:0.07 m/s  Lateral Wall E' velocity:0.09 m/s       ASSESSMENT AND PLAN     Assessment:    //Bradycardia  //Chronic lymphedema  //JOY on CKD, hyperkalemia requiring hemodialysis  //Status post tunneled catheter placement  //Metabolic encephalopathy  //Acute hypoxic hypercapnic respiratory failure  //Acute on chronic CHF  //High risk of aspiration  //Leukocytosis -unclear etiology, infectious disease following      Plan:  //Plan for swallow study today  //Recommend NG tube placement for tube feeds  //Hemodialysis per nephrology  //Completed cefepime 6/28 through 7/2  //Wean supplemental oxygen as tolerated  //Continue bronchodilators  //Aspiration precaution  //On heparin drip    We will continue to follow. I will discuss with attending. Gail Obrien MD  INTERNAL MEDICINE RESIDENT, [place], [place]   7/9/2022    Please note that this chart was generated using voice recognition Dragon dictation software. Although every effort was made to ensure the accuracy of this automated transcription, some errors in transcription may have occurred. Attending Physician Statement  I have discussed the care of [address] [place] [address] Drive, including pertinent history and exam findings with the resident. I have reviewed the key elements of all parts of the encounter with the resident. I have seen and examined the patient with the resident. I agree with the assessment and plan and status of the problem list as documented.   Please see full note by pulmonary resident Dr. Belinda Carlson     I seen the patient during around today, chart reviewed, labs seen last imaging studies seen. Patient is lethargic eyes closed but easily arousable on stimulation mildly tachypneic not in distress she is hemodynamically stable systolic blood pressure above 130 and she is afebrile. She is on 2 L nasal cannula maintaining saturation 98%  To 100%. No acute hypoxic event or distress was reported per nursing staff she has bilateral breath sounds present weak cough but maintaining airways no upper airway secretions heard at this time. Last chest x-ray on 07/02/2022 shows bilateral small effusion/edema.     She is scheduled for speech evaluation/swallow evaluation. Because of her mentation and weak cough she is at risk for aspiration and would recommend to have NG tube placed and start tube feeding. Would recommend mobilization physical therapy, encourage cough incentive spirometry if she is able to do. Continue with bronchodilators, aspiration precaution. She had completed clindamycin. Currently on amiodarone on heparin drip heart rate is less than 100 controlled.     Discussed with nursing staff, treatment and plan discussed.        Please note that this chart was generated using voice recognition Dragon dictation software.  Although every effort was made to ensure the accuracy of this automated transcription, some errors in transcription may have occurred.   Pérez Becerril MD  7/9/2022 12:05 PM

## 2022-07-09 NOTE — PROGRESS NOTES
Physical Therapy  Facility/Department: [facility]  Daily Treatment Note    Name: Viky Copeland  : [birth year]  MRN: [id number]  Date of Service: 2022    Discharge Recommendations:  Patient would benefit from continued therapy after discharge   PT Equipment Recommendations  Other: Pt unsafe to amb any distance without skilled assistance      Patient Diagnosis(es): The encounter diagnosis was Bradycardia. Past Medical History:  has a past medical history of Acute CVA (cerebrovascular accident) ([address] Acute kidney injury superimposed on chronic kidney disease ([address] Acute on chronic combined systolic (congestive) and diastolic (congestive) heart failure ([address] JOY (acute kidney injury) ([address] Altered mental status, Anticoagulated, Arthritis, Asthma, Bradycardia, Brain mass, Cellulitis and abscess, Cellulitis and abscess of unspecified site, Cellulitis of both lower extremities, Cellulitis of left lower extremity, Cellulitis of lower extremity, CHF (congestive heart failure) ([address] Chronic kidney disease, unspecified, CKD stage 4 secondary to hypertension ([address] Coronary atherosclerosis of native coronary artery, Elevated LFTs, Essential hypertension, Essential hypertension, malignant, Hallucinations, Hard of hearing, Head contusion, Hypertensive emergency, Hypertensive urgency with joy, Hypokalemia, Hypomagnesemia, Iron deficiency anemia, unspecified, Meningioma ([address] Myocardial infarction ([address] Other and unspecified hyperlipidemia, Pure hypercholesterolemia, Stage 4 chronic kidney disease ([address] Toxic metabolic encephalopathy, Type 2 diabetes mellitus with stage 4 chronic kidney disease, without long-term current use of insulin ([address] Type II or unspecified type diabetes mellitus without mention of complication, not stated as uncontrolled, Unspecified asthma(493.90), Unspecified venous (peripheral) insufficiency, Unspecified vitamin D deficiency, and UTI (urinary tract infection).   Past Surgical History:  has a past surgical history that includes Tonsillectomy and adenoidectomy; Coronary artery bypass graft; intubation (3/7/2022); Thoracentesis (3/10/2022); Upper gastrointestinal endoscopy (N/A, 3/15/2022); Colonoscopy (N/A, 3/15/2022); IR NONTUNNELED VASCULAR CATHETER > 5 YEARS (6/28/2022); and IR TUNNELED CVC PLACE WO SQ PORT/PUMP > 5 YEARS (7/8/2022). Assessment   Body Structures, Functions, Activity Limitations Requiring Skilled Therapeutic Intervention: Decreased functional mobility ; Decreased strength;Decreased endurance;Decreased balance  Assessment: Pt grossly modA-maxA x 1-2 for mobility and maxA for brief sit<->stand transfer to a RW. Pt able to maintain sitting EOB with CGA. Pt would benefit from continued acute PT to address deficits. Therapy Prognosis: Good  Activity Tolerance  Activity Tolerance: Patient limited by fatigue;Patient limited by endurance     Plan   Plan  Plan:  (6x/wk)  Current Treatment Recommendations: Strengthening,Balance training,Functional mobility training,Transfer training,Endurance training,Gait training,Stair training,Home exercise program,Safety education & training,Patient/Caregiver education & training,Equipment evaluation, education, & procurement  Safety Devices  Type of Devices: Call light within reach,Nurse notified,Gait belt,Patient at risk for falls,Left in chair,All fall risk precautions in place,Chair alarm in place  Restraints  Restraints Initially in Place: No     Restrictions  Restrictions/Precautions  Restrictions/Precautions: Fall Risk,Up as Tolerated,NPO  Required Braces or Orthoses?: No  Position Activity Restriction  Other position/activity restrictions: 6/28 emergent intubation and non-tunnelled HD catheter. extubated 7/6 at [address]  Chart Reviewed: Yes  Response To Previous Treatment: Patient with no complaints from previous session. Family / Caregiver Present: No  General Comment  Comments: Pt retired to supine with call light.   RN notified. Bed alarm  Subjective  Subjective: RN and pt agreeable to PT. Pt supine upon arrival.  Pt pleasant and cooperative t/o session. Pt has no c/o. Vision/Hearing  Vision  Vision: Impaired  Vision Exceptions:  (R eye blind with cataracts, Sx put off d/t other medical conditions. has glasses for other eye but broken. needs new ones)  Hearing  Hearing: Exceptions to [facility]  Hearing Exceptions: Hard of hearing/hearing concerns    Cognition   Orientation  Overall Orientation Status: Impaired  Orientation Level: Oriented to person;Disoriented to place; Disoriented to time;Disoriented to situation (pt asked where she was)     Objective   Bed mobility  Supine to Sit: Moderate assistance  Sit to Supine: Maximum assistance;2 Person assistance  Bed Mobility Comments: HOB elevated to complete with increased time and effort. Transfers  Sit to Stand: Maximum Assistance  Stand to sit: Maximum Assistance  Comment: Assessed to RW; unable to maintain; excessive fwd flexed posture  Ambulation  Comments: unable to assess today; pt unsteady with STS transfers     Balance  Posture: Poor  Sitting - Static: Fair;+  Sitting - Dynamic: Fair  Standing - Static: Poor  Comments: Assessed with RW in standing  Dynamic Sitting Balance Exercises: pt sat EOB x 20 minutes with CGA to maintain with excessive fwd flexed posture. B LE seated therapeutic exercises which included heel/toe raises and LAQ x 10 reps.   Static Standing Balance Exercises: Brief static stand at RW but unable to maintain        OutComes Score   AM-PAC Score  AM-PAC Inpatient Mobility Raw Score : 9 (07/09/22 1217)  AM-PAC Inpatient T-Scale Score : 30.55 (07/09/22 1217)  Mobility Inpatient CMS 0-100% Score: 81.38 (07/09/22 1217)  Mobility Inpatient CMS G-Code Modifier : CM (07/09/22 1217)          Goals  Short Term Goals  Time Frame for Short term goals: 14 visits  Short term goal 1: Pt will be CGA mobility  Short term goal 2: Pt will be CGA transfers  Short term goal 3: Pt will be Mehdi mancera 50' RW  Short term goal 4: Pt will navigate 4 steps Mehdi dorado       Education  Patient Education  Education Given To: Patient  Education Provided: Role of Therapy;Plan of Care;Transfer Training  Education Provided Comments: verbal cues for hand placement with STS transfers  Education Method: Demonstration  Barriers to Learning: Hearing;Vision  Education Outcome: Verbalized understanding;Continued education needed      Therapy Time   Individual Concurrent Group Co-treatment   Time In 1139         Time Out 1210         Minutes 31         Timed Code Treatment Minutes: Jimbo Mendez, Angela Aquino [facility]

## 2022-07-09 NOTE — PROGRESS NOTES
[facility]  Office: [address], DO, Anival Ogden, DO, Juanito Whitman, DO, Heaven Cody Blood, DO, Laverne Rosales MD, Robyn Singh MD, Bushra Mayberry MD, Melly Tavares MD, Cesar Hwang MD, Lester Carrera MD, Juan F Etienne MD, Valeria Cortez, DO, Josephine Courtney MD,  Stacy Kemp, DO, Lul Ulloa MD, Lisette eJffries MD, Sylwia Swartz, DO, Janelle Barrientos MD, Darcy Daily MD, Paul Chase DO, Clementina Car MD, Katy Painting MD, Hilary Macias, [facility], [facility] Matt, CNP, Ria Barclay, CNP, Miguel Mcfarland, CNP, Reji Girard, CNP, Katey Coombs, CNP, TABATHA BergerC, Roselyn Cannon, DNP, Johanna Swan, CNP, Aakash Calixto, CNP, Polina Mancia, CNP, Ry Arora, CNS, Radha Marks, [facility], Darryl Bello, CNP, Cristy Goldman, CNP, [facility], [address]    Progress Note    7/9/2022    7:36 AM    Name:   Unique Barajas  MRN:     [id number]     Acct:      [de-identified]   Room:   2019/2019-01   Day:  12  Admit Date:  6/27/2022 12:28 PM    PCP:   Kristi Clark MD  Code Status:  Full Code    Subjective:     C/C:   Chief Complaint   Patient presents with    Bradycardia     Interval History Status: slight improvement     Patient seen and examined at bedside, no acute events overnight.   Still with intermittent sleepiness and disorientation but wakes up and responds appropriately afterwards  Has scheduled swallow study today  Afebrile overnight  Patient vitals, labs and all providers notes were reviewed,from overnight shift and morning updates were noted and discussed with the nurse    Brief History:     65-year-old with prior history of CKD stage IV( baseline creatinine 1.8-2), chronic diastolic CHF, hypertension, CAD s/p CABG in 2003 and occluded SVG to OM 2 and cath in 2006, COPD, type 2 diabetes, A. fib on amiodarone and Eliquis  Presented to ED on 6/27/2021 with complaints of shortness of breath hypoxia improved on 90s, and fatigue. Was found to be bradycardic heart rate in 35 by EMS and was placed on transcutaneous pacing and received atropine. She was subsequently started on epinephrine drip and admitted to the ICU. X-ray consistent with pulmonary edema. Cardiology was consulted and switched to dopamine drip, and Eliquis was switched to heparin WBP. Was also noted to be hyperkalemic and started on hemodialysis sec to worsening CKD on 06/28/22. Neurology was consulted for encephalopathy : noted to have right anterior temporal fossa meningioma with possible edema. MRI considered nonemergent and deferred.,   ID was consulted for leukocytosis and b/l LE wounds which are chronic(sees wound care clinic outpatient) and Right gluteal /coccygeal ulcerations. Started on Cefepime.   -On 6/28/2022 she had progressively worsening mentation with worsening hypoxia and hypercapnia she was intubated and started on HD. Heparin was held on 6/29-07/01 secondary to oozing at right IJ dialysis catheter site. Patient was continued on empiric antibiotics through 7/1/22(urine and blood cx NG), dialysis/UF and subsequently extubated on 07/06/22. Received 1u PRBC 7/7/22 for hb 7.0  Today she is on 2l NC O2, K 3.9, bicarb 27, Hb 8.4, lethargic, arousable but falling asleep easily, appears tired. Review of Systems:     Review of Systems   Constitutional: Positive for activity change and appetite change. Negative for chills, diaphoresis and fever. HENT: Positive for trouble swallowing. Negative for congestion. Eyes: Negative for visual disturbance. Respiratory: Positive for cough. Negative for chest tightness, shortness of breath and wheezing. Cardiovascular: Negative for chest pain, palpitations and leg swelling. Gastrointestinal: Negative for abdominal pain, blood in stool, constipation, diarrhea, nausea and vomiting. Genitourinary: Negative for difficulty urinating.    Neurological: Negative for dizziness, weakness, light-headedness, numbness and headaches. Psychiatric/Behavioral: Positive for confusion. All other systems reviewed and are negative. Medications: Allergies:     Allergies   Allergen Reactions    Latex Rash    Aleve [Naproxen Sodium]      Chronic kidney disease stage III, CHF    Pioglitazone Other (See Comments)     Congestive heart failure    Claritin [Loratadine]     Keflex [Cephalexin]     Lisinopril      Needs clarification of contraindication       Current Meds:   Scheduled Meds:    metoprolol tartrate  25 mg Oral BID    dextromethorphan-guaiFENesin  10 mL Oral TID    epoetin rick-epbx  5,000 Units IntraVENous Q MWF    docusate  100 mg Oral Daily    famotidine (PEPCID) injection  10 mg IntraVENous Daily    levothyroxine  25 mcg Oral Daily    lidocaine 1 % injection  5 mL IntraDERmal Once    sodium chloride flush  5-40 mL IntraVENous 2 times per day    sodium polystyrene  15 g Oral Once    amiodarone  100 mg Oral Daily    rocuronium  0.6 mg/kg IntraVENous Once    albuterol  2.5 mg Nebulization Q6H    sodium chloride flush  5-40 mL IntraVENous 2 times per day    aspirin EC  81 mg Oral Daily    atorvastatin  40 mg Oral Daily    magnesium oxide  400 mg Oral BID    miconazole   Topical BID    ferrous sulfate  325 mg Oral Daily with breakfast     Continuous Infusions:    sodium chloride      sodium chloride      sodium chloride      dextrose      sodium chloride 75 mL/hr at 07/06/22 2000    heparin (PORCINE) Infusion 20 Units/kg/hr (07/08/22 1603)     PRN Meds: LORazepam, sodium chloride, albumin human, sodium chloride flush, sodium chloride, sodium chloride, fentanNYL, heparin (porcine), heparin (porcine), glucose, dextrose bolus **OR** dextrose bolus, glucagon (rDNA), dextrose, sodium chloride flush, sodium chloride, ondansetron **OR** ondansetron, polyethylene glycol, acetaminophen **OR** acetaminophen, heparin (porcine), heparin (porcine), morphine    Data:     Past Medical History:   has a past medical history of Acute CVA (cerebrovascular accident) ([facility] [address] Acute kidney injury superimposed on chronic kidney disease ([facility] [address] Acute on chronic combined systolic (congestive) and diastolic (congestive) heart failure ([address] JOY (acute kidney injury) ([facility] [address] Altered mental status, Anticoagulated, Arthritis, Asthma, Bradycardia, Brain mass, Cellulitis and abscess, Cellulitis and abscess of unspecified site, Cellulitis of both lower extremities, Cellulitis of left lower extremity, Cellulitis of lower extremity, CHF (congestive heart failure) ([facility] [address] Chronic kidney disease, unspecified, CKD stage 4 secondary to hypertension ([facility] [address] Coronary atherosclerosis of native coronary artery, Elevated LFTs, Essential hypertension, Essential hypertension, malignant, Hallucinations, Hard of hearing, Head contusion, Hypertensive emergency, Hypertensive urgency with joy, Hypokalemia, Hypomagnesemia, Iron deficiency anemia, unspecified, Meningioma ([facility] [address] Myocardial infarction ([facility] [address] Other and unspecified hyperlipidemia, Pure hypercholesterolemia, Stage 4 chronic kidney disease ([address] Toxic metabolic encephalopathy, Type 2 diabetes mellitus with stage 4 chronic kidney disease, without long-term current use of insulin ([facility] [address] Type II or unspecified type diabetes mellitus without mention of complication, not stated as uncontrolled, Unspecified asthma(493.90), Unspecified venous (peripheral) insufficiency, Unspecified vitamin D deficiency, and UTI (urinary tract infection). Social History:   reports that she quit smoking about 22 years ago. She has a 2.50 pack-year smoking history. She has never used smokeless tobacco. She reports previous alcohol use. She reports that she does not use drugs.      Family History:   Family History   Problem Relation Age of Onset    Diabetes Mother     Heart Disease Mother     Heart Disease Father     Diabetes Sister     High Blood Pressure Sister  Diabetes Maternal Grandmother        Vitals:  BP (!) 133/57   Pulse 84   Temp 97.7 °F (36.5 °C) (Oral)   Resp 20   Ht 4' 11\" (1.499 m)   Wt 153 lb 7 oz (69.6 kg)   SpO2 100%   BMI 30.99 kg/m²   Temp (24hrs), Av.6 °F (36.4 °C), Min:97.1 °F (36.2 °C), Max:98.2 °F (36.8 °C)    Recent Labs     22  0805 22  1617 22  2104   POCGLU 133* 133* 137*       I/O (24Hr): Intake/Output Summary (Last 24 hours) at 2022 0736  Last data filed at 2022 1328  Gross per 24 hour   Intake 230 ml   Output 1800 ml   Net -1570 ml       Labs:  Hematology:  Recent Labs     22  1826 22  04222  0823 22  2133 22  0512   WBC 8.5  --  10.2  --   --  9.3   RBC 2.69*  --  2.78*  --   --  2.78*   HGB 8.1*   < > 8.4*  --  8.2* 8.5*   HCT 27.5*   < > 27.8*  --  27.5* 28.4*   .2  --  100.0  --   --  102.2   MCH 30.1  --  30.2  --   --  30.6   MCHC 29.5  --  30.2  --   --  29.9   RDW 17.3*  --  17.2*  --   --  17.0*   *  --  173  --   --  196   MPV 9.7  --  9.8  --   --  9.6   INR  --   --   --  1.0  --   --     < > = values in this interval not displayed.      Chemistry:  Recent Labs     22  04222  0512    131* 136   K 3.7 3.9 3.7    98 101   CO2 27 27 26   GLUCOSE 111* 150* 116*   BUN 23 31* 17   CREATININE 1.35* 1.97* 1.48*   ANIONGAP 9 6* 9   LABGLOM 39* 25* 35*   GFRAA 47* 30* 42*   CALCIUM 7.8* 8.1* 8.5*     Recent Labs     22  0805 22  1617 22  2104   POCGLU 133* 133* 137*     ABG:  Lab Results   Component Value Date/Time    POCPH 7.411 2022 04:40 AM    PHART 7.459 2022 04:28 AM    POCPCO2 44.8 2022 04:40 AM    TRE8KZK 39.9 2022 04:28 AM    POCPO2 92.6 2022 04:40 AM    PO2ART 107.0 2022 04:28 AM    POCHCO3 28.5 2022 04:40 AM    SHO7INU 28.3 2022 04:28 AM    NBEA 5 2022 05:36 PM    PBEA 4 2022 04:40 AM    SSKB2LYM 97 2022 04:40 AM    H7KTBRZR 97.4 03/09/2022 04:28 AM    FIO2 30.0 07/06/2022 04:40 AM     Lab Results   Component Value Date/Time    SPECIAL NOT GIVEN 06/27/2022 01:11 PM     Lab Results   Component Value Date/Time    CULTURE NO GROWTH <24 HRS 07/09/2022 05:12 AM    CULTURE NO GROWTH <24 HRS 07/09/2022 05:12 AM       Radiology:  XR CHEST PORTABLE    Result Date: 7/3/2022  Mild increased perihilar edema, mild increased perihilar opacification, likely edema and CHF. Persistent small effusions and bibasilar atelectasis. Satisfactory position of endotracheal tube. IR TUNNELED CVC PLACE WO SQ PORT/PUMP > 5 YEARS    Result Date: 7/8/2022  Successful ultrasound and fluoroscopy guided tunneled catheter placement . Okay to use. Physical Examination:        Physical Exam  Vitals and nursing note reviewed. Constitutional:       General: She is not in acute distress. Interventions: Nasal cannula in place. HENT:      Head: Normocephalic and atraumatic. Eyes:      Conjunctiva/sclera: Conjunctivae normal.      Pupils: Pupils are equal, round, and reactive to light. Cardiovascular:      Rate and Rhythm: Normal rate and regular rhythm. Heart sounds: No murmur heard. Pulmonary:      Effort: Pulmonary effort is normal. No accessory muscle usage or respiratory distress. Breath sounds: Transmitted upper airway sounds present. No stridor. Rhonchi and rales present. No decreased breath sounds or wheezing. Chest:      Comments: Tunneled catheter noted   Abdominal:      General: Bowel sounds are normal. There is no distension. Palpations: Abdomen is soft. Abdomen is not rigid. Tenderness: There is no abdominal tenderness. There is no guarding. Musculoskeletal:         General: No tenderness. Skin:     General: Skin is warm and dry. Findings: No erythema, lesion or rash. Neurological:      Mental Status: She is alert. She is disoriented. Cranial Nerves: No cranial nerve deficit. Motor: No seizure activity. Psychiatric:         Speech: Speech normal.         Behavior: Behavior normal. Behavior is cooperative. Assessment:        Hospital Problems           Last Modified POA    * (Principal) Bradycardia 6/27/2022 Yes    Lymphedema 6/28/2022 Yes    Acute kidney injury superimposed on CKD ([address] 6/28/2022 Yes    Hyperkalemia 6/28/2022 Yes    Shock ([address] 6/28/2022 Yes    Leukocytosis 6/28/2022 Yes    Acute respiratory failure with hypoxia and hypercapnia ([address] 6/28/2022 Yes    Pulmonary edema cardiac cause ([address] 6/28/2022 Yes    Dependence on renal dialysis ([address] 7/5/2022 Yes    Nephrotic range proteinuria 7/5/2022 Yes    Arterial hypotension 7/5/2022 Yes    Acute on chronic diastolic congestive heart failure ([address] 6/28/2022 Yes    JOY (acute kidney injury) ([address] 7/5/2022 Yes          Plan:        Principal Problem:    Bradycardia  Active Problems:    Lymphedema    Acute kidney injury superimposed on CKD (HCC)    Hyperkalemia    Shock (HCC)    Leukocytosis    Acute respiratory failure with hypoxia and hypercapnia (HCC)    Pulmonary edema cardiac cause (HCC)    Dependence on renal dialysis ([address]    Nephrotic range proteinuria    Arterial hypotension    Acute on chronic diastolic congestive heart failure (HCC)    JOY (acute kidney injury) ([address]  Resolved Problems:    * No resolved hospital problems. *    - Complicated course needing ICU stay, needed drips, and initiation of HD due to hyperkalemia in a progressing kidney disease   - S/P  tunneled catheter placement 7/8.    -Hemodialysis management as per nephrology. -If hemoglobin remains stable on heparin drip for 24 hours will switch to oral anticoagulation.  -Continue aspirin, statin, beta-blocker,Amiodarone.  -Continue Synthroid at home dose.   - on NC 2lit :at baseline,continue to monitor.   - Has a video swallow study scheduled, not sure if she is ready, will reschedule it   - Seroquel discontinued  - GI prophylaxis.       I reviewed hospital course including labs, images and notes    - Discussed with the patient and the nurse      Steven Louis MD  7/9/2022  7:36 AM

## 2022-07-09 NOTE — PROGRESS NOTES
Attending Physician Statement  I have discussed the care of [address] [place] [address] Drive, including pertinent history and exam findings with the resident. I have reviewed the key elements of all parts of the encounter with the resident. I have seen and examined the patient with the resident. I agree with the assessment and plan and status of the problem list as documented. Please see full note by pulmonary resident Dr. Ernesto Trivedi    I seen the patient during around today, chart reviewed, labs seen last imaging studies seen. Patient is lethargic eyes closed but easily arousable on stimulation mildly tachypneic not in distress she is hemodynamically stable systolic blood pressure above 130 and she is afebrile. She is on 2 L nasal cannula maintaining saturation 98%  To 100%. No acute hypoxic event or distress was reported per nursing staff she has bilateral breath sounds present weak cough but maintaining airways no upper airway secretions heard at this time. Last chest x-ray on 07/02/2022 shows bilateral small effusion/edema. She is scheduled for speech evaluation/swallow evaluation. Because of her mentation and weak cough she is at risk for aspiration and would recommend to have NG tube placed and start tube feeding. Would recommend mobilization physical therapy, encourage cough incentive spirometry if she is able to do. Continue with bronchodilators, aspiration precaution. She had completed clindamycin. Currently on amiodarone on heparin drip heart rate is less than 100 controlled. Discussed with nursing staff, treatment and plan discussed. Please note that this chart was generated using voice recognition Dragon dictation software. Although every effort was made to ensure the accuracy of this automated transcription, some errors in transcription may have occurred.      Lani Deluca MD  7/9/2022 12:05 PM

## 2022-07-09 NOTE — PLAN OF CARE
Problem: Discharge Planning  Goal: Discharge to home or other facility with appropriate resources  7/9/2022 0052 by Tiarra Santiago RN  Outcome: Progressing  Flowsheets (Taken 7/8/2022 2000)  Discharge to home or other facility with appropriate resources: Identify barriers to discharge with patient and caregiver  7/8/2022 1804 by Jd Thacker RN  Outcome: Progressing     Problem: Chronic Conditions and Co-morbidities  Goal: Patient's chronic conditions and co-morbidity symptoms are monitored and maintained or improved  7/9/2022 0052 by Tiarra Santiago RN  Outcome: Progressing  Flowsheets (Taken 7/8/2022 2000)  Care Plan - Patient's Chronic Conditions and Co-Morbidity Symptoms are Monitored and Maintained or Improved: Monitor and assess patient's chronic conditions and comorbid symptoms for stability, deterioration, or improvement  7/8/2022 1804 by Jd Thacker RN  Outcome: Progressing     Problem: Pain  Goal: Verbalizes/displays adequate comfort level or baseline comfort level  7/9/2022 0052 by Tiarra Santiago RN  Outcome: Progressing  7/8/2022 1804 by Jd Thacker RN  Outcome: Progressing     Problem: Skin/Tissue Integrity  Goal: Absence of new skin breakdown  Description: 1. Monitor for areas of redness and/or skin breakdown  2. Assess vascular access sites hourly  3. Every 4-6 hours minimum:  Change oxygen saturation probe site  4. Every 4-6 hours:  If on nasal continuous positive airway pressure, respiratory therapy assess nares and determine need for appliance change or resting period.   7/9/2022 0052 by Tiarra Santiago RN  Outcome: Progressing  7/8/2022 1804 by Jd Thacker RN  Outcome: Progressing     Problem: Safety - Adult  Goal: Free from fall injury  7/9/2022 0052 by Tiarra Santiago RN  Outcome: Progressing  Flowsheets (Taken 7/8/2022 2100)  Free From Fall Injury: Instruct family/caregiver on patient safety  7/8/2022 1804 by Jd Thacker RN  Outcome: Progressing  Flowsheets (Taken 7/8/2022 36)  Free From Fall Injury: Instruct family/caregiver on patient safety     Problem: ABCDS Injury Assessment  Goal: Absence of physical injury  7/9/2022 0052 by Thais Bermudez RN  Outcome: Progressing  Flowsheets (Taken 7/8/2022 2100)  Absence of Physical Injury: Implement safety measures based on patient assessment  7/8/2022 1804 by Sarah Coronado RN  Outcome: Progressing  Flowsheets (Taken 7/8/2022 1049)  Absence of Physical Injury: Implement safety measures based on patient assessment     Problem: Respiratory - Adult  Goal: Achieves optimal ventilation and oxygenation  7/9/2022 0052 by Thais Bermudez RN  Outcome: Progressing  7/8/2022 1804 by Sarah Coronado RN  Outcome: Progressing     Problem: Nutrition Deficit:  Goal: Optimize nutritional status  7/8/2022 1804 by Sarah Coronado RN  Outcome: Progressing  Flowsheets (Taken 7/8/2022 1515 by Santi Bell, MS, RD, LD)  Nutrient intake appropriate for improving, restoring, or maintaining nutritional needs:   Assess nutritional status and recommend course of action   Monitor oral intake, labs, and treatment plans   Recommend appropriate diets, oral nutritional supplements, and vitamin/mineral supplements

## 2022-07-09 NOTE — PROGRESS NOTES
[id number] [employer]  Speech Language Pathology    Date: 7/9/2022  Patient Name: Dane Weaver  YOB: 1950   AGE: 66 y.o. MRN: [id number]        Patient Not Available for Speech Therapy     Due to:  [] Testing  [] Hemodialysis  [] Cancelled by RN  [] Surgery   [] Intubation/Sedation/Pain Medication  [] Medical instability  [x] Other:  Reduced alertness; not appropriate for MBSS at this time per RN    Next scheduled treatment: 7/10/22 or as medically able to participate    Completed by: Mariya Cervantes SLP, M. Ed.  [id number]

## 2022-07-09 NOTE — PROGRESS NOTES
Contacted on-call NP about continuing mentation change observed at the end of day shift on 7/8 into 7/9. Alerted her to Pt's status, stable vital signs plus A/O x2 and sometimes 3. Performed neuro checks, checked pupils PERRLA, assessed for signs of stroke; nothing positive except continued confusion about location and people present. Requested meds in IV form due to Pt's NPO status for swallow study on 7/9. Only one was ordered for over night. Telemetry showed some Afib as the morning came. HR in the 80s.

## 2022-07-10 ENCOUNTER — APPOINTMENT (OUTPATIENT)
Dept: GENERAL RADIOLOGY | Age: 72
DRG: 308 | End: 2022-07-10
Payer: OTHER GOVERNMENT

## 2022-07-10 PROBLEM — K64.4 EXTERNAL HEMORRHOID: Status: ACTIVE | Noted: 2022-07-10

## 2022-07-10 LAB
ABSOLUTE EOS #: 0.06 K/UL (ref 0–0.44)
ABSOLUTE IMMATURE GRANULOCYTE: 0.27 K/UL (ref 0–0.3)
ABSOLUTE LYMPH #: 1.08 K/UL (ref 1.1–3.7)
ABSOLUTE MONO #: 0.9 K/UL (ref 0.1–1.2)
ANION GAP SERPL CALCULATED.3IONS-SCNC: 12 MMOL/L (ref 9–17)
BASOPHILS # BLD: 1 % (ref 0–2)
BASOPHILS ABSOLUTE: 0.04 K/UL (ref 0–0.2)
BUN BLDV-MCNC: 24 MG/DL (ref 8–23)
CALCIUM SERPL-MCNC: 8.4 MG/DL (ref 8.6–10.4)
CHLORIDE BLD-SCNC: 99 MMOL/L (ref 98–107)
CO2: 24 MMOL/L (ref 20–31)
CREAT SERPL-MCNC: 2.14 MG/DL (ref 0.5–0.9)
EOSINOPHILS RELATIVE PERCENT: 1 % (ref 1–4)
GFR AFRICAN AMERICAN: 27 ML/MIN
GFR NON-AFRICAN AMERICAN: 23 ML/MIN
GFR SERPL CREATININE-BSD FRML MDRD: ABNORMAL ML/MIN/{1.73_M2}
GLUCOSE BLD-MCNC: 107 MG/DL (ref 65–105)
GLUCOSE BLD-MCNC: 115 MG/DL (ref 65–105)
GLUCOSE BLD-MCNC: 85 MG/DL (ref 65–105)
GLUCOSE BLD-MCNC: 89 MG/DL (ref 70–99)
HCT VFR BLD CALC: 28.1 % (ref 36.3–47.1)
HEMOGLOBIN: 8.4 G/DL (ref 11.9–15.1)
IMMATURE GRANULOCYTES: 3 %
LYMPHOCYTES # BLD: 12 % (ref 24–43)
MCH RBC QN AUTO: 29.8 PG (ref 25.2–33.5)
MCHC RBC AUTO-ENTMCNC: 29.9 G/DL (ref 28.4–34.8)
MCV RBC AUTO: 99.6 FL (ref 82.6–102.9)
MONOCYTES # BLD: 10 % (ref 3–12)
NRBC AUTOMATED: 0 PER 100 WBC
PARTIAL THROMBOPLASTIN TIME: 51.2 SEC (ref 20.5–30.5)
PARTIAL THROMBOPLASTIN TIME: 55.3 SEC (ref 20.5–30.5)
PDW BLD-RTO: 16.8 % (ref 11.8–14.4)
PLATELET # BLD: 252 K/UL (ref 138–453)
PMV BLD AUTO: 9.2 FL (ref 8.1–13.5)
POTASSIUM SERPL-SCNC: 3.7 MMOL/L (ref 3.7–5.3)
RBC # BLD: 2.82 M/UL (ref 3.95–5.11)
RBC # BLD: ABNORMAL 10*6/UL
SEG NEUTROPHILS: 73 % (ref 36–65)
SEGMENTED NEUTROPHILS ABSOLUTE COUNT: 6.51 K/UL (ref 1.5–8.1)
SODIUM BLD-SCNC: 135 MMOL/L (ref 135–144)
WBC # BLD: 8.9 K/UL (ref 3.5–11.3)

## 2022-07-10 PROCEDURE — 92611 MOTION FLUOROSCOPY/SWALLOW: CPT

## 2022-07-10 PROCEDURE — 99232 SBSQ HOSP IP/OBS MODERATE 35: CPT | Performed by: INTERNAL MEDICINE

## 2022-07-10 PROCEDURE — 2500000003 HC RX 250 WO HCPCS

## 2022-07-10 PROCEDURE — 6370000000 HC RX 637 (ALT 250 FOR IP): Performed by: STUDENT IN AN ORGANIZED HEALTH CARE EDUCATION/TRAINING PROGRAM

## 2022-07-10 PROCEDURE — 80048 BASIC METABOLIC PNL TOTAL CA: CPT

## 2022-07-10 PROCEDURE — 99233 SBSQ HOSP IP/OBS HIGH 50: CPT | Performed by: INTERNAL MEDICINE

## 2022-07-10 PROCEDURE — A4216 STERILE WATER/SALINE, 10 ML: HCPCS | Performed by: STUDENT IN AN ORGANIZED HEALTH CARE EDUCATION/TRAINING PROGRAM

## 2022-07-10 PROCEDURE — 36415 COLL VENOUS BLD VENIPUNCTURE: CPT

## 2022-07-10 PROCEDURE — 6360000002 HC RX W HCPCS: Performed by: STUDENT IN AN ORGANIZED HEALTH CARE EDUCATION/TRAINING PROGRAM

## 2022-07-10 PROCEDURE — 2060000000 HC ICU INTERMEDIATE R&B

## 2022-07-10 PROCEDURE — 51798 US URINE CAPACITY MEASURE: CPT

## 2022-07-10 PROCEDURE — 85025 COMPLETE CBC W/AUTO DIFF WBC: CPT

## 2022-07-10 PROCEDURE — 2700000000 HC OXYGEN THERAPY PER DAY

## 2022-07-10 PROCEDURE — 2580000003 HC RX 258: Performed by: STUDENT IN AN ORGANIZED HEALTH CARE EDUCATION/TRAINING PROGRAM

## 2022-07-10 PROCEDURE — 6360000002 HC RX W HCPCS: Performed by: INTERNAL MEDICINE

## 2022-07-10 PROCEDURE — 85730 THROMBOPLASTIN TIME PARTIAL: CPT

## 2022-07-10 PROCEDURE — 94640 AIRWAY INHALATION TREATMENT: CPT

## 2022-07-10 PROCEDURE — 6370000000 HC RX 637 (ALT 250 FOR IP): Performed by: FAMILY MEDICINE

## 2022-07-10 PROCEDURE — 82947 ASSAY GLUCOSE BLOOD QUANT: CPT

## 2022-07-10 PROCEDURE — 6370000000 HC RX 637 (ALT 250 FOR IP): Performed by: INTERNAL MEDICINE

## 2022-07-10 PROCEDURE — 99232 SBSQ HOSP IP/OBS MODERATE 35: CPT | Performed by: FAMILY MEDICINE

## 2022-07-10 PROCEDURE — 71045 X-RAY EXAM CHEST 1 VIEW: CPT

## 2022-07-10 PROCEDURE — 74230 X-RAY XM SWLNG FUNCJ C+: CPT

## 2022-07-10 PROCEDURE — 94761 N-INVAS EAR/PLS OXIMETRY MLT: CPT

## 2022-07-10 PROCEDURE — 2500000003 HC RX 250 WO HCPCS: Performed by: STUDENT IN AN ORGANIZED HEALTH CARE EDUCATION/TRAINING PROGRAM

## 2022-07-10 RX ORDER — PANTOPRAZOLE SODIUM 40 MG/1
40 TABLET, DELAYED RELEASE ORAL
Status: DISCONTINUED | OUTPATIENT
Start: 2022-07-11 | End: 2022-07-18 | Stop reason: HOSPADM

## 2022-07-10 RX ORDER — METOPROLOL TARTRATE 5 MG/5ML
INJECTION INTRAVENOUS
Status: COMPLETED
Start: 2022-07-10 | End: 2022-07-10

## 2022-07-10 RX ORDER — HYDROCORTISONE ACETATE 25 MG/1
25 SUPPOSITORY RECTAL 2 TIMES DAILY
Status: DISCONTINUED | OUTPATIENT
Start: 2022-07-10 | End: 2022-07-14

## 2022-07-10 RX ORDER — LIDOCAINE HYDROCHLORIDE 20 MG/ML
JELLY TOPICAL PRN
Status: DISCONTINUED | OUTPATIENT
Start: 2022-07-10 | End: 2022-07-18 | Stop reason: HOSPADM

## 2022-07-10 RX ADMIN — GUAIFENESIN DEXTROMETHORPHAN HYDROBROMIDE ORAL SOLUTION 10 ML: 200; 20 SOLUTION ORAL at 20:25

## 2022-07-10 RX ADMIN — AMIODARONE HYDROCHLORIDE 100 MG: 200 TABLET ORAL at 08:00

## 2022-07-10 RX ADMIN — DOCUSATE SODIUM LIQUID 100 MG: 100 LIQUID ORAL at 08:00

## 2022-07-10 RX ADMIN — ALBUTEROL SULFATE 2.5 MG: 2.5 SOLUTION RESPIRATORY (INHALATION) at 14:24

## 2022-07-10 RX ADMIN — ALBUTEROL SULFATE 2.5 MG: 2.5 SOLUTION RESPIRATORY (INHALATION) at 21:03

## 2022-07-10 RX ADMIN — MORPHINE SULFATE 2 MG: 2 INJECTION, SOLUTION INTRAMUSCULAR; INTRAVENOUS at 14:17

## 2022-07-10 RX ADMIN — APIXABAN 5 MG: 5 TABLET, FILM COATED ORAL at 20:25

## 2022-07-10 RX ADMIN — ANTI-FUNGAL POWDER MICONAZOLE NITRATE TALC FREE: 1.42 POWDER TOPICAL at 08:00

## 2022-07-10 RX ADMIN — METOPROLOL TARTRATE 5 MG: 1 INJECTION, SOLUTION INTRAVENOUS at 05:02

## 2022-07-10 RX ADMIN — ANTI-FUNGAL POWDER MICONAZOLE NITRATE TALC FREE: 1.42 POWDER TOPICAL at 20:26

## 2022-07-10 RX ADMIN — HYDROCORTISONE ACETATE 25 MG: 25 SUPPOSITORY RECTAL at 13:19

## 2022-07-10 RX ADMIN — HEPARIN SODIUM 16 UNITS/KG/HR: 10000 INJECTION, SOLUTION INTRAVENOUS at 04:12

## 2022-07-10 RX ADMIN — METOPROLOL TARTRATE 25 MG: 25 TABLET ORAL at 20:25

## 2022-07-10 RX ADMIN — GUAIFENESIN DEXTROMETHORPHAN HYDROBROMIDE ORAL SOLUTION 10 ML: 200; 20 SOLUTION ORAL at 14:45

## 2022-07-10 RX ADMIN — HYDROCORTISONE ACETATE 25 MG: 25 SUPPOSITORY RECTAL at 21:16

## 2022-07-10 RX ADMIN — Medication 81 MG: at 07:59

## 2022-07-10 RX ADMIN — FERROUS SULFATE TAB EC 325 MG (65 MG FE EQUIVALENT) 325 MG: 325 (65 FE) TABLET DELAYED RESPONSE at 12:25

## 2022-07-10 RX ADMIN — GUAIFENESIN DEXTROMETHORPHAN HYDROBROMIDE ORAL SOLUTION 10 ML: 200; 20 SOLUTION ORAL at 08:00

## 2022-07-10 RX ADMIN — MAGNESIUM GLUCONATE 500 MG ORAL TABLET 400 MG: 500 TABLET ORAL at 07:59

## 2022-07-10 RX ADMIN — MAGNESIUM GLUCONATE 500 MG ORAL TABLET 400 MG: 500 TABLET ORAL at 20:25

## 2022-07-10 RX ADMIN — ATORVASTATIN CALCIUM 40 MG: 80 TABLET, FILM COATED ORAL at 07:59

## 2022-07-10 RX ADMIN — LEVOTHYROXINE SODIUM 25 MCG: 25 TABLET ORAL at 07:59

## 2022-07-10 RX ADMIN — METOPROLOL TARTRATE 25 MG: 25 TABLET ORAL at 10:00

## 2022-07-10 RX ADMIN — ALBUTEROL SULFATE 2.5 MG: 2.5 SOLUTION RESPIRATORY (INHALATION) at 02:08

## 2022-07-10 RX ADMIN — APIXABAN 5 MG: 5 TABLET, FILM COATED ORAL at 14:45

## 2022-07-10 RX ADMIN — SODIUM CHLORIDE, PRESERVATIVE FREE 10 MG: 5 INJECTION INTRAVENOUS at 08:00

## 2022-07-10 RX ADMIN — ALBUTEROL SULFATE 2.5 MG: 2.5 SOLUTION RESPIRATORY (INHALATION) at 08:54

## 2022-07-10 ASSESSMENT — ENCOUNTER SYMPTOMS
EYE REDNESS: 0
TROUBLE SWALLOWING: 0
TROUBLE SWALLOWING: 1
BLOOD IN STOOL: 0
VOICE CHANGE: 0
CONSTIPATION: 1
NAUSEA: 0
SHORTNESS OF BREATH: 0
PHOTOPHOBIA: 0
SORE THROAT: 0
CONSTIPATION: 0
ALLERGIC/IMMUNOLOGIC NEGATIVE: 1
COUGH: 1
ABDOMINAL DISTENTION: 0
ABDOMINAL PAIN: 0
VOMITING: 0
WHEEZING: 0
CHEST TIGHTNESS: 0
DIARRHEA: 0

## 2022-07-10 NOTE — PROCEDURES
INSTRUMENTAL SWALLOW REPORT  MODIFIED BARIUM SWALLOW    NAME: Alvaro Reed   : 1950  MRN: [id number]       Date of Eval: 7/10/2022        Radiologist: Dr. Stefanie Curtis     Referring Diagnosis(es):      Past Medical History:  has a past medical history of Acute CVA (cerebrovascular accident) [facility]), Acute kidney injury superimposed on chronic kidney disease ([address] Acute on chronic combined systolic (congestive) and diastolic (congestive) heart failure ([address] JOY (acute kidney injury) ([address] Altered mental status, Anticoagulated, Arthritis, Asthma, Bradycardia, Brain mass, Cellulitis and abscess, Cellulitis and abscess of unspecified site, Cellulitis of both lower extremities, Cellulitis of left lower extremity, Cellulitis of lower extremity, CHF (congestive heart failure) ([address] Chronic kidney disease, unspecified, CKD stage 4 secondary to hypertension ([address] Coronary atherosclerosis of native coronary artery, Elevated LFTs, Essential hypertension, Essential hypertension, malignant, Hallucinations, Hard of hearing, Head contusion, Hypertensive emergency, Hypertensive urgency with joy, Hypokalemia, Hypomagnesemia, Iron deficiency anemia, unspecified, Meningioma ([address] Myocardial infarction ([address] Other and unspecified hyperlipidemia, Pure hypercholesterolemia, Stage 4 chronic kidney disease ([address] Toxic metabolic encephalopathy, Type 2 diabetes mellitus with stage 4 chronic kidney disease, without long-term current use of insulin ([address] Type II or unspecified type diabetes mellitus without mention of complication, not stated as uncontrolled, Unspecified asthma(493.90), Unspecified venous (peripheral) insufficiency, Unspecified vitamin D deficiency, and UTI (urinary tract infection). Past Surgical History:  has a past surgical history that includes Tonsillectomy and adenoidectomy; Coronary artery bypass graft; intubation (3/7/2022); Thoracentesis (3/10/2022);  Upper gastrointestinal endoscopy (N/A, 3/15/2022); Colonoscopy (N/A, 3/15/2022); IR NONTUNNELED VASCULAR CATHETER > 5 YEARS (6/28/2022); and IR TUNNELED CVC PLACE WO SQ PORT/PUMP > 5 YEARS (7/8/2022). Type of Study: Initial MBS    Recent CXR/CT of Chest: 7/3/2022    Impression   Mild increased perihilar edema, mild increased perihilar opacification,   likely edema and CHF.  Persistent small effusions and bibasilar atelectasis.       Satisfactory position of endotracheal tube. Patient Complaints/Reason for Referral:  Jared Holland was referred for a MBS to assess the efficiency of his/her swallow function, assess for aspiration, and to make recommendations regarding safe dietary consistencies, effective compensatory strategies, and safe eating environment. Onset of problem:   69-year-old with prior history of CKD stage IV( baseline creatinine 1.8-2), chronic diastolic CHF, hypertension, CAD s/p CABG in 2003 and occluded SVG to OM 2 and cath in 2006, COPD, type 2 diabetes, A. fib on amiodarone and Eliquis  Presented to ED on 6/27/2021 with complaints of shortness of breath hypoxia improved on 90s, and fatigue. Was found to be bradycardic heart rate in 35 by EMS and was placed on transcutaneous pacing and received atropine. She was subsequently started on epinephrine drip and admitted to the ICU. X-ray consistent with pulmonary edema. Cardiology was consulted and switched to dopamine drip, and Eliquis was switched to heparin WBP. Was also noted to be hyperkalemic and started on hemodialysis sec to worsening CKD on 06/28/22. Neurology was consulted for encephalopathy : noted to have right anterior temporal fossa meningioma with possible edema. MRI considered nonemergent and deferred.,   ID was consulted for leukocytosis and b/l LE wounds which are chronic(sees wound care clinic outpatient) and Right gluteal /coccygeal ulcerations.  Started on Cefepime.   -On 6/28/2022 she had progressively worsening mentation with worsening hypoxia and hypercapnia she was intubated and started on HD. Heparin was held on 6/29-07/01 secondary to oozing at right IJ dialysis catheter site. Patient was continued on empiric antibiotics through 7/1/22(urine and blood cx NG), dialysis/UF and subsequently extubated on 07/06/22. Received 1u PRBC 7/7/22 for hb 7.0    Behavior/Cognition/Vision/Hearing:  Behavior/Cognition: Alert; Cooperative  Vision: Impaired  Vision Exceptions: Wears glasses at all times  Hearing: Exceptions to [facility]  Hearing Exceptions: Hard of hearing/hearing concerns    Impressions:  Patient presents with safe swallow for Dysphagia soft and bite/sized (Dysphagia III) diet with thin liquids as evidenced by no aspiration noted with consistencies tested. +trace flash penetration, no aspiration w/ thin liquids. +min extended and decreased mastication of soft solids, +mod extended mastication of regular solids. Recommend small sips and bites, only feed when alert and awake and upright at 90 degrees for all PO intake. Recommend close monitoring for overt/clinical s/s of aspiration and D/C PO intake and complete Modified Barium Swallow Study should they occur. Results and recommendations reported to RN. Patient Position: Lateral and  90 degrees     Consistencies Administered: Mildly Thick straw;Mildly Thick teaspoon; Thin straw;Pureed; Soft & Bite Sized; Regular    Dysphagia Outcome Severity Scale: Level 4: Mild moderate dysphagia- Intermittent supervision/cueing. One - two diet consistencies restricted  Penetration-Aspiration Scale (PAS): 2 - Material enters the airway, remains above the vocal folds, and is ejected from the airway    Recommended Diet:  Solid consistency: Soft and Bite-Sized  Liquid consistency:  Thin  Liquid administration via: Cup;Straw  Medication administration: PO    Safe Swallow Protocol:  Compensatory Swallowing Strategies : Upright as possible for all oral intake;Small bites/sips    Recommendations/Treatment  Requires SLP Intervention: Yes  D/C Recommendations: Ongoing speech therapy is recommended during this hospitalization  Frequency: 1-2x per week    Recommended Exercises:    Therapeutic Interventions: Diet tolerance monitoring;Patient/Family education    Education: Images and recommendations were reviewed with pt and RN following this exam.   Patient Education: yes  Patient Education Response: Verbalizes understanding    Goals:    Long Term:   To Maximize safety with intake, optimize nutrition/hydration and minimize risk for aspiration. Short Term:  Dysphagia Goals: The patient will tolerate recommended diet without observed clinical signs of aspiration    Oral Preparation / Oral Phase  WFL. +min extended and decreased mastication of soft solid, +mod extended mastication of regular solids. +premature spill to pyriforms w/ soft solids, +premature spill to vallecula w/ puree, regular solids, and thin liquids. Decreased bolus cohesion w/ puree, however oral phase WFL for consistencies tested     Pharyngeal Phase  Puree: No penetration, no aspiration, +min residual in vallecula. Soft solids:  No penetration, no aspiration, +min residual in vallecula and pyriforms   Regular solids:  No penetration, no aspiration, +modresidual in vallecula and pyriforms and posterior pharyngeal wall, decreased w/ liquid wash   Mildly-thick tsp and straw:  No penetration, no aspiration, +min-mod residual in vallecula and pyriforms, independently cleared w/ double swallow  Thin straw: +trace flash penetration, no aspiration.  +min residual in vallecula and pyriforms     Esophageal Phase  Esophageal Screen: WFL    Pain   Pain Level: 0  Pain Type: Acute pain      Therapy Time:   Individual Concurrent Group Co-treatment   Time In  1040         Time Out 1054         Minutes 14             Eula Sylvester, STACI, 7/10/2022, 12:56 PM

## 2022-07-10 NOTE — PROGRESS NOTES
[facility]  Office: [address], DO, Gosia Olivas, DO, Armando Oscar, DO, Ariela Hilaria Cummings, DO, Kellen Montaño MD, Gianfranco Gillespie MD, Corinne Woo MD, Luis Howe MD, Ryder Mayo MD, Kaci Ruiz MD, Tram Darden MD, Crista Holden, DO, Awa Bellamy MD,  Nicole Bosch, DO, Lara Tavarez MD, Addis Cagle MD, Gage Obrien DO, Nicola Pearl MD, Gogo Montana MD, Sindhu Baron DO, Franki Purcell MD, Rosy Schirmer, MD, Nieves Harding [facility], [facility], CNP, Lance Jenkins CNP, Dane Sebastian, CNP, Aga Middleton, CNP, Ashok Velasco, CNP, Lora Lincoln PA-C, Elaine Zhou DNP, No Penn, CNP, Asha Monahan, CNP, Emily Rosario, CNP, Ze Yang, CNS, Radha Jung, [facility], Juan José Miller, CNP, Lisbeth Ga CNP, [facility], [facility]    Progress Note    7/10/2022    1:52 PM    Name:   Kishore Ortiz  MRN:     [id number]     Acct:      [de-identified]   Room:   2019/2019-01  IP Day:  15  Admit Date:  6/27/2022 12:28 PM    PCP:   Angy Mckenna MD  Code Status:  Full Code    Subjective:     C/C:   Chief Complaint   Patient presents with    Bradycardia     Interval History Status: slight improvement     Patient seen and examined at bedside, no acute events overnight.   confusion is resolving, just slow to respond  C/O pain with defecation , has Haemorrhoids  Has scheduled swallow study today  Afebrile overnight  Patient vitals, labs and all providers notes were reviewed,from overnight shift and morning updates were noted and discussed with the nurse    Brief History:     71-year-old with prior history of CKD stage IV( baseline creatinine 1.8-2), chronic diastolic CHF, hypertension, CAD s/p CABG in 2003 and occluded SVG to OM 2 and cath in 2006, COPD, type 2 diabetes, A. fib on amiodarone and Eliquis  Presented to ED on 6/27/2021 with complaints of shortness of breath hypoxia improved on 90s, and fatigue. Was found to be bradycardic heart rate in 35 by EMS and was placed on transcutaneous pacing and received atropine. She was subsequently started on epinephrine drip and admitted to the ICU. X-ray consistent with pulmonary edema. Cardiology was consulted and switched to dopamine drip, and Eliquis was switched to heparin WBP. Was also noted to be hyperkalemic and started on hemodialysis sec to worsening CKD on 06/28/22. Neurology was consulted for encephalopathy : noted to have right anterior temporal fossa meningioma with possible edema. MRI considered nonemergent and deferred.,   ID was consulted for leukocytosis and b/l LE wounds which are chronic(sees wound care clinic outpatient) and Right gluteal /coccygeal ulcerations. Started on Cefepime.   -On 6/28/2022 she had progressively worsening mentation with worsening hypoxia and hypercapnia she was intubated and started on HD. Heparin was held on 6/29-07/01 secondary to oozing at right IJ dialysis catheter site. Patient was continued on empiric antibiotics through 7/1/22(urine and blood cx NG), dialysis/UF and subsequently extubated on 07/06/22. Received 1u PRBC 7/7/22 for hb 7.0  Today she is on 2l NC O2, K 3.9, bicarb 27, Hb 8.4, lethargic, arousable but falling asleep easily, appears tired. Review of Systems:     Review of Systems   Constitutional: Positive for activity change and appetite change. Negative for chills, diaphoresis and fever. HENT: Positive for trouble swallowing. Negative for congestion. Eyes: Negative for visual disturbance. Respiratory: Positive for cough. Negative for chest tightness, shortness of breath and wheezing. Cardiovascular: Negative for chest pain, palpitations and leg swelling. Gastrointestinal: Negative for abdominal pain, blood in stool, constipation, diarrhea, nausea and vomiting. Genitourinary: Negative for difficulty urinating.    Neurological: Negative for dizziness, weakness, light-headedness, numbness and headaches. Psychiatric/Behavioral: Positive for confusion (resolving). All other systems reviewed and are negative. Medications: Allergies:     Allergies   Allergen Reactions    Latex Rash    Aleve [Naproxen Sodium]      Chronic kidney disease stage III, CHF    Pioglitazone Other (See Comments)     Congestive heart failure    Claritin [Loratadine]     Keflex [Cephalexin]     Lisinopril      Needs clarification of contraindication       Current Meds:   Scheduled Meds:    hydrocortisone  25 mg Rectal BID    [START ON 7/11/2022] pantoprazole  40 mg Oral QAM AC    apixaban  5 mg Oral BID    metoprolol tartrate  25 mg Oral BID    dextromethorphan-guaiFENesin  10 mL Oral TID    epoetin rick-epbx  5,000 Units IntraVENous Q MWF    docusate  100 mg Oral Daily    levothyroxine  25 mcg Oral Daily    lidocaine 1 % injection  5 mL IntraDERmal Once    sodium chloride flush  5-40 mL IntraVENous 2 times per day    sodium polystyrene  15 g Oral Once    amiodarone  100 mg Oral Daily    rocuronium  0.6 mg/kg IntraVENous Once    albuterol  2.5 mg Nebulization Q6H    sodium chloride flush  5-40 mL IntraVENous 2 times per day    aspirin EC  81 mg Oral Daily    atorvastatin  40 mg Oral Daily    magnesium oxide  400 mg Oral BID    miconazole   Topical BID    ferrous sulfate  325 mg Oral Daily with breakfast     Continuous Infusions:    sodium chloride      sodium chloride      sodium chloride      dextrose      sodium chloride 75 mL/hr at 07/06/22 2000     PRN Meds: bisacodyl, LORazepam, sodium chloride, albumin human, sodium chloride flush, sodium chloride, sodium chloride, fentanNYL, heparin (porcine), heparin (porcine), glucose, dextrose bolus **OR** dextrose bolus, glucagon (rDNA), dextrose, sodium chloride flush, sodium chloride, ondansetron **OR** ondansetron, polyethylene glycol, acetaminophen **OR** acetaminophen, morphine    Data:     Past Medical Vitals:  BP (!) 136/59   Pulse 86   Temp 97.8 °F (36.6 °C) (Oral)   Resp 20   Ht 4' 11\" (1.499 m)   Wt 153 lb 7 oz (69.6 kg)   SpO2 100%   BMI 30.99 kg/m²   Temp (24hrs), Av °F (36.7 °C), Min:97.5 °F (36.4 °C), Max:98.6 °F (37 °C)    Recent Labs     22  1617 22  2104 07/10/22  0837 07/10/22  1137   POCGLU 133* 137* 85 107*       I/O (24Hr): Intake/Output Summary (Last 24 hours) at 7/10/2022 1352  Last data filed at 2022 1821  Gross per 24 hour   Intake 0 ml   Output 100 ml   Net -100 ml       Labs:  Hematology:  Recent Labs     22  04222  0421 07/08/22  0823 07/08/22  2133 07/09/22  0512 07/10/22  0247   WBC 10.2  --   --   --  9.3 8.9   RBC 2.78*  --   --   --  2.78* 2.82*   HGB 8.4*   < >  --  8.2* 8.5* 8.4*   HCT 27.8*   < >  --  27.5* 28.4* 28.1*   .0  --   --   --  102.2 99.6   MCH 30.2  --   --   --  30.6 29.8   MCHC 30.2  --   --   --  29.9 29.9   RDW 17.2*  --   --   --  17.0* 16.8*     --   --   --  196 252   MPV 9.8  --   --   --  9.6 9.2   INR  --   --  1.0  --   --   --     < > = values in this interval not displayed.      Chemistry:  Recent Labs     07/08/22  0421 07/09/22  0512 07/10/22  0247   * 136 135   K 3.9 3.7 3.7   CL 98 101 99   CO2 27 26 24   GLUCOSE 150* 116* 89   BUN 31* 17 24*   CREATININE 1.97* 1.48* 2.14*   ANIONGAP 6* 9 12   LABGLOM 25* 35* 23*   GFRAA 30* 42* 27*   CALCIUM 8.1* 8.5* 8.4*     Recent Labs     22  0805 22  1617 22  2104 07/10/22  0837 07/10/22  1137   POCGLU 133* 133* 137* 85 107*     ABG:  Lab Results   Component Value Date/Time    POCPH 7.411 2022 04:40 AM    PHART 7.459 2022 04:28 AM    POCPCO2 44.8 2022 04:40 AM    FUK0ZAP 39.9 2022 04:28 AM    POCPO2 92.6 2022 04:40 AM    PO2ART 107.0 2022 04:28 AM    POCHCO3 28.5 2022 04:40 AM    BSZ7RHR 28.3 2022 04:28 AM    NBEA 5 2022 05:36 PM    PBEA 4 2022 04:40 AM    PPGA8UCW 97 07/06/2022 04:40 AM    F1EFUYZT 97.4 03/09/2022 04:28 AM    FIO2 30.0 07/06/2022 04:40 AM     Lab Results   Component Value Date/Time    SPECIAL NOT GIVEN 06/27/2022 01:11 PM     Lab Results   Component Value Date/Time    CULTURE NO GROWTH 1 DAY 07/09/2022 05:12 AM    CULTURE NO GROWTH 1 DAY 07/09/2022 05:12 AM       Radiology:  XR CHEST PORTABLE    Result Date: 7/3/2022  Mild increased perihilar edema, mild increased perihilar opacification, likely edema and CHF. Persistent small effusions and bibasilar atelectasis. Satisfactory position of endotracheal tube. IR TUNNELED CVC PLACE WO SQ PORT/PUMP > 5 YEARS    Result Date: 7/8/2022  Successful ultrasound and fluoroscopy guided tunneled catheter placement . Okay to use. Physical Examination:        Physical Exam  Vitals and nursing note reviewed. Constitutional:       General: She is not in acute distress. Interventions: Nasal cannula in place. HENT:      Head: Normocephalic and atraumatic. Eyes:      Conjunctiva/sclera: Conjunctivae normal.      Pupils: Pupils are equal, round, and reactive to light. Cardiovascular:      Rate and Rhythm: Normal rate and regular rhythm. Heart sounds: No murmur heard. Pulmonary:      Effort: Pulmonary effort is normal. No accessory muscle usage or respiratory distress. Breath sounds: Transmitted upper airway sounds present. No stridor. Rhonchi and rales present. No decreased breath sounds or wheezing. Chest:      Comments: Tunneled catheter noted   Abdominal:      General: Bowel sounds are normal. There is no distension. Palpations: Abdomen is soft. Abdomen is not rigid. Tenderness: There is no abdominal tenderness. There is no guarding. Musculoskeletal:         General: No tenderness. Skin:     General: Skin is warm and dry. Findings: No erythema, lesion or rash. Neurological:      Mental Status: She is alert. She is disoriented.       Cranial Nerves: No cranial nerve deficit. Motor: No seizure activity. Psychiatric:         Speech: Speech normal.         Behavior: Behavior normal. Behavior is cooperative. Assessment:        Hospital Problems           Last Modified POA    * (Principal) Bradycardia 6/27/2022 Yes    Lymphedema 6/28/2022 Yes    Acute kidney injury superimposed on CKD ([address] 6/28/2022 Yes    Hyperkalemia 6/28/2022 Yes    Shock ([address] 6/28/2022 Yes    Leukocytosis 6/28/2022 Yes    Acute respiratory failure with hypoxia and hypercapnia ([address] 6/28/2022 Yes    Pulmonary edema cardiac cause ([address] 6/28/2022 Yes    Dependence on renal dialysis ([address] 7/5/2022 Yes    Nephrotic range proteinuria 7/5/2022 Yes    Arterial hypotension 7/5/2022 Yes    External hemorrhoid 7/10/2022 Yes    Acute on chronic diastolic congestive heart failure ([address] 6/28/2022 Yes    JOY (acute kidney injury) ([address] 7/5/2022 Yes          Plan:        Principal Problem:    Bradycardia  Active Problems:    Lymphedema    Acute kidney injury superimposed on CKD (HCC)    Hyperkalemia    Shock (HCC)    Leukocytosis    Acute respiratory failure with hypoxia and hypercapnia (HCC)    Pulmonary edema cardiac cause (HCC)    Dependence on renal dialysis ([address]    Nephrotic range proteinuria    Arterial hypotension    External hemorrhoid    Acute on chronic diastolic congestive heart failure (HCC)    JOY (acute kidney injury) ([address]  Resolved Problems:    * No resolved hospital problems. *    - Complicated course needing ICU stay, needed drips, and initiation of HD due to hyperkalemia in a progressing kidney disease   - S/P  tunneled catheter placement 7/8.    -Hemodialysis management as per nephrology.  -Encephalopathy is improving  -Given hemoglobin remains stable and no further procedures planned will  switch to oral anticoagulation and DC   -Continue aspirin, statin, beta-blocker,Amiodarone.  -Continue Synthroid at home dose.   - Continue chronic wound care ( POA)   - On supplemental O2 continue to titrate as tolerated  - Has a video swallow study today, passed with dysphagia diet soft, bite size   - Seroquel discontinued  - Add Anusol  - GI prophylaxis.       I reviewed hospital course including labs, images and notes    - Discussed with the patient and the nurse      Niki Doan MD  7/10/2022  1:52 PM

## 2022-07-10 NOTE — PROGRESS NOTES
PULMONARY & CRITICAL CARE MEDICINE PROGRESS NOTE     Patient:  Juana Forde  MRN: [id number]  Admit date: 6/27/2022  Primary Care Physician: Martine Mancia MD  Consulting Physician: Gaye Hardy MD  CODE Status: Full Code  LOS: 15     SUBJECTIVE     Chief Complaint/ Reason for consult:    Admitted to ICU with bradycardia/acute respiratory failure    Hospital Course: The patient is a 66 y.o. female with prior history of of CKD stage IV( baseline creatinine 1.8-2), chronic diastolic CHF, hypertension, CAD s/p CABG in  in 2003 and occluded SVG to OM 2 and cath in 2006, COPD, type 2 diabetes, A. fib on amiodarone and Eliquis  Presented to ED on 6/27/2021 with complaints of shortness of breath hypoxia improved on 90s, and fatigue. Was found to be bradycardic heart rate in 35 by EMS and was placed on transcutaneous pacing and received atropine. She was subsequently started on epinephrine drip and admitted to the ICU. X-ray consistent with pulmonary edema. Cardiology was consulted and switched to dopamine drip, and Eliquis was switched to heparin drip. Was also noted to be hyperkalemic and started on hemodialysis sec to worsening CKD on 06/28/22. Neurology was consulted for encephalopathy : noted to have right anterior temporal fossa meningioma with possible edema. MRI considered nonemergent and deferred.,  ID was consulted for leukocytosis and b/l LE wounds which are chronic(sees wound care clinic outpatient) and Right gluteal /coccygeal ulcerations. Started on Cefepime. -On 6/28/2022 she had progressively worsening mentation with worsening hypoxia and hypercapnia she was intubated and started on HD. Heparin was held on 6/29-07/01 secondary to oozing at right IJ dialysis catheter site. Patient was continued on empiric antibiotics through 7/1/22(urine and blood cx NG), dialysis/UF and subsequently extubated on 07/06/22. Received 1u PRBC 7/7/22 for hb 7.0. transferred to floor on 7/8/22.      Interval History:  07/10/22  Pt was seen and examined at bedside. Overnight events noted, chart reviewed, labs seen. Patient is more alert today she is more oriented and today she is oriented to time place and person. Complaining of constipation and having bowel movement but with low stool does not complain of abdominal pain denies vomiting. Patient does not complain of shortness of breath at rest and came shortness of breath is better mild cough. She is on 2 l nasal cannula and maintaining saturation 98%. Hemodynamically she has been stable systolic blood pressure around 140 afebrile overnight with T-max of 98.5. She is going to go down for swallow evaluation today      Review Of Systems:  Review of Systems   Constitutional: Positive for activity change. Negative for chills and fever. HENT: Negative for postnasal drip, sore throat, trouble swallowing and voice change. Eyes: Negative for photophobia, redness and visual disturbance. Respiratory: Positive for cough. Negative for shortness of breath and wheezing. Cardiovascular: Negative for chest pain, palpitations and leg swelling. Gastrointestinal: Positive for constipation. Negative for abdominal distention, abdominal pain, diarrhea and vomiting. Endocrine: Negative for polydipsia, polyphagia and polyuria. Genitourinary: Negative for dysuria, frequency and hematuria. Musculoskeletal: Positive for gait problem. Allergic/Immunologic: Negative. Neurological: Negative for dizziness, syncope, speech difficulty and numbness. Hematological: Negative for adenopathy. Does not bruise/bleed easily. Psychiatric/Behavioral: Negative. OBJECTIVE     PaO2/FiO2 RATIO:  No results for input(s): POCPO2 in the last 72 hours.    FiO2 : 28 %     VITAL SIGNS:   LAST:  BP (!) 150/59   Pulse 96   Temp 98.6 °F (37 °C) (Oral)   Resp 21   Ht 4' 11\" (1.499 m)   Wt 153 lb 7 oz (69.6 kg)   SpO2 98%   BMI 30.99 kg/m²   8-24 HR RANGE:  TEMP Temp  Av.1 °F (36.7 °C)  Min: 97.5 °F (36.4 °C)  Max: 98.6 °F (37 °C)   BP Systolic (34DAI), OVH:548 , Min:140 , NPU:582      Diastolic (60WJX), RHL:88, Min:58, Max:62     PULSE Pulse  Av.7  Min: 84  Max: 96   RR Resp  Av.7  Min: 13  Max: 21   O2 SAT SpO2  Av %  Min: 98 %  Max: 100 %   OXYGEN DELIVERY O2 Flow Rate (L/min)  Av L/min  Min: 2 L/min  Max: 2 L/min        Systemic Examination:   Physical Exam -  Constitutional: Alert and awake look comfortable no distress   Mental Status:  Oriented to person, place and time and normal affect. Lungs:  Bilateral air entry present, lung fields clear. Normal effort. Slightly decreased at bases mild upper airway sound present  Heart: Irregularly irregular rhythm, no murmur. Abdomen:  Soft, nontender, nondistended, normal bowel sounds. Extremities: Positive edema of upper extremities, Ace wrap present in both lower extremities. Skin:  Warm, dry, no gross lesions or rashes.     DATA REVIEW     Medications: Current Inpatient  Scheduled Meds:   metoprolol tartrate  25 mg Oral BID    dextromethorphan-guaiFENesin  10 mL Oral TID    epoetin rick-epbx  5,000 Units IntraVENous Q MWF    docusate  100 mg Oral Daily    famotidine (PEPCID) injection  10 mg IntraVENous Daily    levothyroxine  25 mcg Oral Daily    lidocaine 1 % injection  5 mL IntraDERmal Once    sodium chloride flush  5-40 mL IntraVENous 2 times per day    sodium polystyrene  15 g Oral Once    amiodarone  100 mg Oral Daily    rocuronium  0.6 mg/kg IntraVENous Once    albuterol  2.5 mg Nebulization Q6H    sodium chloride flush  5-40 mL IntraVENous 2 times per day    aspirin EC  81 mg Oral Daily    atorvastatin  40 mg Oral Daily    magnesium oxide  400 mg Oral BID    miconazole   Topical BID    ferrous sulfate  325 mg Oral Daily with breakfast     Continuous Infusions:   sodium chloride      sodium chloride      sodium chloride      dextrose      sodium chloride 75 mL/hr at 22    heparin (PORCINE) Infusion 16 Units/kg/hr (07/10/22 0412)       INPUT/OUTPUT:  In: 0   Out: 100 [Urine:100]       LABS:  ABGs:   No results for input(s): POCPH, POCPCO2, POCPO2, POCHCO3, SFNU7XZC in the last 72 hours. CBC:   Recent Labs     07/07/22  1826 07/08/22  0421 07/08/22 2133 07/09/22  0512 07/10/22  0247   WBC  --  10.2  --  9.3 8.9   HGB 9.4* 8.4* 8.2* 8.5* 8.4*   HCT 31.5* 27.8* 27.5* 28.4* 28.1*   MCV  --  100.0  --  102.2 99.6   PLT  --  173  --  196 252   LYMPHOPCT  --  8*  --  11* 12*   RBC  --  2.78*  --  2.78* 2.82*   MCH  --  30.2  --  30.6 29.8   MCHC  --  30.2  --  29.9 29.9   RDW  --  17.2*  --  17.0* 16.8*     CRP:   No results for input(s): CRP in the last 72 hours. LDH:   No results for input(s): LDH in the last 72 hours. BMP:   Recent Labs     07/08/22  0421 07/09/22  0512 07/10/22  0247   * 136 135   K 3.9 3.7 3.7   CL 98 101 99   CO2 27 26 24   BUN 31* 17 24*   CREATININE 1.97* 1.48* 2.14*   GLUCOSE 150* 116* 89     Liver Function Test:   No results for input(s): PROT, LABALBU, ALT, AST, GGT, ALKPHOS, BILITOT in the last 72 hours. Coagulation Profile:   Recent Labs     07/08/22  0823 07/08/22  1716 07/08/22 2133 07/09/22  0745 07/09/22  1925 07/10/22  0247 07/10/22  0851   INR 1.0  --   --   --   --   --   --    PROTIME 10.9  --   --   --   --   --   --    APTT 57.2*   < > 65.5* 76.4* 82.7* 55.3* 51.2*    < > = values in this interval not displayed. D-Dimer:  No results for input(s): DDIMER in the last 72 hours. Lactic Acid:  No results for input(s): LACTA in the last 72 hours. Cardiac Enzymes:  No results for input(s): CKTOTAL, CKMB, CKMBINDEX, TROPONINI in the last 72 hours. Invalid input(s): TROPONIN, HSTROP  BNP/ProBNP:   No results for input(s): BNP, PROBNP in the last 72 hours. Triglycerides:  No results for input(s): TRIG in the last 72 hours.      Microbiology:  Urine Culture:  No components found for: CURINE  Blood Culture:  No components found for: CBLOOD, CFUNGUSBL  Sputum Culture:  No components found for: CSPUTUM  Recent Labs     07/09/22  100 [facility],42-10 . BLOOD  . BLOOD   CULTURE NO GROWTH 1 DAY  NO GROWTH 1 DAY     Recent Labs     07/09/22  0512   SPECDESC . BLOOD  . BLOOD   CULTURE NO GROWTH 1 DAY  NO GROWTH 1 DAY        Pathology:    Radiology Reports:  IR TUNNELED CVC PLACE WO SQ PORT/PUMP > 5 YEARS   Final Result   Successful ultrasound and fluoroscopy guided tunneled catheter placement . Okay to use. XR CHEST PORTABLE   Final Result   Mild increased perihilar edema, mild increased perihilar opacification,   likely edema and CHF. Persistent small effusions and bibasilar atelectasis. Satisfactory position of endotracheal tube. XR CHEST PORTABLE   Final Result   Stable support lines      Slight progression in now moderate diffuse bilateral interstitial infiltrates   related to increasing edema and/or infection with moderate bilateral pleural   effusions         XR CHEST PORTABLE   Final Result   Scattered infiltrates with bilateral effusions. Support tubes as described above. US RENAL COMPLETE   Final Result   Chronic renal disease. Small renal cysts. 1.4 cm right renal stone. No   hydronephrosis. IR NONTUNNELED VASCULAR CATHETER > 5 YEARS   Final Result   Successful ultrasound and fluoroscopy guided 13.5 Telugu by 15 cm non   tunneled hemodialysis catheter placement. Ready for use. XR CHEST PORTABLE   Final Result   1. Endotracheal tube with the tip just entering the right mainstem bronchus. Recommend retraction by 4 cm. 2.  Right lower lobe airspace consolidation and small bilateral pleural   effusions.       These findings were discussed with the inpatient nurse Merlin Sauger at 1637   hours on 28 June 2022         XR ABDOMEN FOR NG/OG/NE TUBE PLACEMENT   Final Result   NG tube position, as detailed         XR CHEST PORTABLE   Final Result   Slight improvement in still moderate diffuse bilateral interstitial   infiltrates related to improving pulmonary edema with decreasing still   moderate right and mild to moderate left pleural effusions         CT HEAD WO CONTRAST   Final Result   No acute intracranial hemorrhage or hydrocephalus. Background mild chronic   small vessel white matter changes with remote lacunar insults in the right   caudate and left basal ganglia, unchanged from prior. 2.6 cm anterior right temporal meningioma with suggested mild increase in the   vasogenic edema along the anterior right temporal lobe relative to 4 months   prior. This could be further assessed with a contrast-enhanced brain MRI. XR CHEST PORTABLE   Final Result   Mixed interstitial and alveolar opacities throughout both lungs, increased   from 3 weeks prior. Correlate for edema or contributory infection. Bilateral small left greater than right pleural effusions which are also   increased from 3 weeks prior. Unchanged cardiomegaly.          FL MODIFIED BARIUM SWALLOW W VIDEO    (Results Pending)        Echocardiogram:   Results for orders placed during the hospital encounter of 02/06/22    ECHO Complete 2D W Doppler W Color    Narrative  [address] [facility]    Transthoracic Echocardiography Report (TTE)    Patient Name [id number]       Date of Study                 02/07/2022  [facility]    Date of      1950  Gender                        Female  Birth    Age          66 year(s)  Race                              Room Number  0490        Height:                       58.66 inch, 149 cm    Corporate ID [id number]    Weight:                       132 pounds, 60 kg  #    Patient Acct [de-identified]   BSA:           1.54 m^2       BMI:      27.03  #                                                                kg/m^2    MR #         [id number]      Sonographer                   Saira Price    Accession #  [id number]  Interpreting Physician        Duarte Garcia    Fellow Referring Nurse Practitioner    Interpreting             Referring Physician           Jann Duong  Fellow                                                 Mayra Montalvo    Type of Study    TTE procedure:2D Echocardiogram, M-Mode, Doppler, Color Doppler. Procedure Date  Date: 02/07/2022 Start: 11:37 AM    Study Location: [address] [place] [address]  Technical Quality: Fair visualization    Indications:Congestive heart failure. History / Tech. Comments:  CABG DM CKD COPD HTN AF    Patient Status: Inpatient    Height: 58.66 inches Weight: 132.3 pounds BSA: 1.54 m^2 BMI: 27.03 kg/m^2    Rhythm: Within normal limits    Allergies  - Lisinopril. - Keflex. - *Unlisted:(Aleve,Claratin, Pioglitazone). CONCLUSIONS    Summary  Normal left ventricle size and function with an estimated EF > 55%. No segmental wall motion abnormalities seen. Moderate left ventricular hypertrophy. Normal right ventricular size and function. Left atrial dilatation. Aortic valve is trileaflet. Mild aortic stenosis. Mild-moderate aortic insufficiency. Normal aortic root dimension. Mitral annular calcification is seen. Mild mitral regurgitation. Mild tricuspid regurgitation. Normal right ventricular systolic pressure. No significant pericardial effusion is seen. Pleural effusion present. Signature  ----------------------------------------------------------------------------  Electronically signed by Saira Price(Sonographer) on 02/07/2022 12:16  PM  ----------------------------------------------------------------------------    ----------------------------------------------------------------------------  Electronically signed by Dufm [place] Andrew(Interpreting physician) on  02/07/2022 12:19 PM  ----------------------------------------------------------------------------  FINDINGS  Left Atrium  Left atrial dilatation. Left Ventricle  Normal left ventricle size and function with an estimated EF > 55%.   No segmental wall motion abnormalities seen. Moderate left ventricular hypertrophy. Right Atrium  Right atrial dilatation. Right Ventricle  Normal right ventricular size and function. Mitral Valve  Mitral annular calcification is seen. Mild mitral regurgitation. Aortic Valve  Aortic valve is trileaflet. Mild aortic stenosis. Mild-moderate aortic insufficiency. Tricuspid Valve  Normal tricuspid valve structure and function. Mild tricuspid regurgitation. Normal right ventricular systolic pressure. Pulmonic Valve  The pulmonic valve is normal in structure. Mild pulmonic insufficiency. Pericardial Effusion  No significant pericardial effusion is seen. Pleural Effusion  Pleural effusion present. Miscellaneous  Normal aortic root dimension. E/e' average 16.5  IVC normal diameter & inspiratory collapse indicating normal RA filling  pressure .     M-mode / 2D Measurements & Calculations:    LVIDd:4 cm(3.7 - 5.6 cm)         Diastolic CBGETP:31 ml  RQTTV:4.59 cm(2.2 - 4.0 cm)      Systolic UTFMPL:88.8 ml  ECQZ:4.69 cm(0.6 - 1.1 cm)       Aortic Root:2.9 cm(2.0 - 3.7 cm)  LVPWd:1.53 cm(0.6 - 1.1 cm)      LA Dimension: 5.4 cm(1.9 - 4.0 cm)  Fractional Shortenin %       LA volume/Index: 56.5 ml /37m^2  Calculated LVEF (%): 73.75 %     LVOT:2.2 cm    Mitral:                                 Aortic    Valve Area (P1/2-Time): 2.68 cm^2       Peak Velocity: 2.09 m/s  Peak E-Wave: 1.24 m/s                   Mean Velocity: 1.34 m/s  Peak A-Wave: 0.92 m/s                   Peak Gradient: 17.47 mmHg  E/A Ratio: 1.35                         Mean Gradient: 9 mmHg  Peak Gradient: 6.15 mmHg                AI P1/2t: 325 msec  Mean Gradient: 3 mmHg  Deceleration Time: 169 msec             Area (continuity): 1.84 cm^2  P1/2t: 82 msec                          AV VTI: 51.2 cm    Area (continuity): 2.32 cm^2  Mean Velocity: 0.85 m/s    Tricuspid:    Peak TR Velocity: 3.03 m/s  Peak TR Gradient: 36.7236 mmHg    Septal Wall E' velocity:0.07 m/s  Lateral Wall E' velocity:0.09 m/s       ASSESSMENT AND PLAN     Assessment:    //Bradycardia  //Chronic lymphedema  //JOY on CKD, hyperkalemia requiring hemodialysis  //Status post tunneled catheter placement  //Metabolic encephalopathy  //Acute hypoxic hypercapnic respiratory failure  //Acute on chronic CHF  //High risk of aspiration  //Leukocytosis -unclear etiology, infectious dease following      Plan:    Patient mentation is much better much more oriented. She does have a still weak cough but able to cough upper airway sounds present on coughing  Plan for swallow study today  Encourage cough deep breathing recommend to move out of bed to chair. Strict aspiration precaution. Hemodialysis per nephrology. Completed course of cefepime follow-up with infectious disease. Wean supplemental O2 to keep saturation above 92% per  On heparin drip for atrial fibrillation. Bronchodilators as needed. We will continue to follow  Discussed with nursing staff, treatment plan discussed chaka Teixeira MD  7/10/2022 11: 56 AM    Please note that this chart was generated using voice recognition Dragon dictation software. Although every effort was made to ensure the accuracy of this automated transcription, some errors in transcription may have occurred.

## 2022-07-10 NOTE — PROGRESS NOTES
Renal Progress Note    Patient :  Dylan Taylor; 66 y.o. MRN# [id number]  Location:  2019/2019-01  Attending:  Gerald Bazzi MD  Admit Date:  6/27/2022   Hospital Day: 13      Subjective: Following for JOY/ATN initiated on dialysis this admission with plans for a MWF schedule. Admitted with hypoxia and bradycardia requiring intubation. Extubated 7/6/22. Tunnel placed 7/8/22. Still making some urine. Blood pressure 130-150. On nasal canula  Still has significant edema that is weeping in upper arms and bilateral legs  Are wrapped.   Weak and lethargic      Outpatient Medications:     Medications Prior to Admission: ferrous sulfate (IRON 325) 325 (65 Fe) MG tablet, Take 1 tablet by mouth daily (with breakfast)  apixaban (ELIQUIS) 2.5 MG TABS tablet, Take 1 tablet by mouth 2 times daily  apixaban (ELIQUIS) 2.5 MG TABS tablet, Take 1 tablet by mouth 2 times daily  ferrous sulfate (IRON 325) 325 (65 Fe) MG tablet, Take 1 tablet by mouth daily (with breakfast)  fluticasone-salmeterol (ADVAIR HFA) 115-21 MCG/ACT inhaler, Inhale 2 puffs into the lungs 2 times daily  metoprolol tartrate (LOPRESSOR) 25 MG tablet, Take 1 tablet by mouth 2 times daily  aspirin EC 81 MG EC tablet, Take 1 tablet by mouth daily  hydrALAZINE (APRESOLINE) 25 MG tablet, Take 1 tablet by mouth 3 times daily  albuterol sulfate HFA (PROAIR HFA) 108 (90 Base) MCG/ACT inhaler, Inhale 2 puffs into the lungs every 6 hours as needed for Wheezing or Shortness of Breath (cough)  metoprolol tartrate (LOPRESSOR) 25 MG tablet, Take 1 tablet by mouth 2 times daily  aspirin EC 81 MG EC tablet, Take 1 tablet by mouth daily  hydrALAZINE (APRESOLINE) 25 MG tablet, Take 1 tablet by mouth 3 times daily  albuterol sulfate HFA (PROAIR HFA) 108 (90 Base) MCG/ACT inhaler, Inhale 2 puffs into the lungs every 6 hours as needed for Wheezing or Shortness of Breath (cough)  fluticasone-salmeterol (ADVAIR HFA) 115-21 MCG/ACT inhaler, Inhale 2 puffs into the lungs 2 times daily Maintenance inhaler  amLODIPine (NORVASC) 10 MG tablet, Take 1 tablet by mouth nightly  Blood Pressure KIT, Diagnosis: HTN. Needs to check blood pressure 1-2 times a day until stable, then once a day. Goal blood pressure less than 135/85, and above 110/60. vitamin D (ERGOCALCIFEROL) 1.25 MG (61405 UT) CAPS capsule, Take 1 capsule by mouth once a week Sundays  amiodarone (CORDARONE) 200 MG tablet, Take 1 tablet by mouth daily  Nebulizers (COMPRESSOR/NEBULIZER) MISC, Nebulizer with compressor. Disposable Med Nebs 2 /month. Reusable Med Nebs 1 per 6 months. Aerosol Mask 1 per month. Replacement Filters 2 per month. All other related supplies as needed per month. Medications being used: Albuterol . 083 unit dose vial. Frequency: every 6 hrs x 4/day . Length of Need: 1 (Patient not taking: Reported on 6/17/2022)  albuterol (PROVENTIL) (2.5 MG/3ML) 0.083% nebulizer solution, Take 3 mLs by nebulization every 6 hours as needed for Wheezing or Shortness of Breath (Patient not taking: Reported on 6/17/2022)  Misc. Devices (ADJUST FOLD CANE/YORK HANDLE) MISC, Use daily for walking  Handicap Placard MISC, by Does not apply route Expires on 12/30/25  atorvastatin (LIPITOR) 40 MG tablet, Take 1 tablet by mouth once daily  desloratadine (CLARINEX) 5 MG tablet, Take 1 tablet by mouth once daily  FreeStyle Lancets MISC, 1 each by Does not apply route daily Patient needs to contact office before any further refills will be approved  magnesium oxide (MAG-OX) 400 (241.3 Mg) MG TABS tablet, Take 1 tablet by mouth twice daily  miconazole (MICOTIN) 2 % powder, Apply topically 2 times daily UNDER THE SKIN FOLDS LONG TERM  Multiple Vitamins-Minerals (THERAPEUTIC MULTIVITAMIN-MINERALS) tablet, Take 1 tablet by mouth daily  blood glucose test strips (FREESTYLE LITE) strip, 1 each by In Vitro route daily As needed.   Blood Pressure KIT, 1 kit by Does not apply route three times daily (Patient not taking: Reported on 6/17/2022)  blood glucose monitor kit and supplies, 1 kit by Other route three times daily Dispense Butterfly Elite CBG Device    Current Medications:     Scheduled Meds:    hydrocortisone  25 mg Rectal BID    metoprolol tartrate  25 mg Oral BID    dextromethorphan-guaiFENesin  10 mL Oral TID    epoetin rick-epbx  5,000 Units IntraVENous Q MWF    docusate  100 mg Oral Daily    famotidine (PEPCID) injection  10 mg IntraVENous Daily    levothyroxine  25 mcg Oral Daily    lidocaine 1 % injection  5 mL IntraDERmal Once    sodium chloride flush  5-40 mL IntraVENous 2 times per day    sodium polystyrene  15 g Oral Once    amiodarone  100 mg Oral Daily    rocuronium  0.6 mg/kg IntraVENous Once    albuterol  2.5 mg Nebulization Q6H    sodium chloride flush  5-40 mL IntraVENous 2 times per day    aspirin EC  81 mg Oral Daily    atorvastatin  40 mg Oral Daily    magnesium oxide  400 mg Oral BID    miconazole   Topical BID    ferrous sulfate  325 mg Oral Daily with breakfast     Continuous Infusions:    sodium chloride      sodium chloride      sodium chloride      dextrose      sodium chloride 75 mL/hr at 07/06/22 2000    heparin (PORCINE) Infusion 16 Units/kg/hr (07/10/22 0412)     PRN Meds:  bisacodyl, LORazepam, sodium chloride, albumin human, sodium chloride flush, sodium chloride, sodium chloride, fentanNYL, heparin (porcine), heparin (porcine), glucose, dextrose bolus **OR** dextrose bolus, glucagon (rDNA), dextrose, sodium chloride flush, sodium chloride, ondansetron **OR** ondansetron, polyethylene glycol, acetaminophen **OR** acetaminophen, heparin (porcine), heparin (porcine), morphine    Input/Output:       I/O last 3 completed shifts: In: 0   Out: 100 [Urine:100].       Patient Vitals for the past 96 hrs (Last 3 readings):   Weight   07/08/22 1328 153 lb 7 oz (69.6 kg)   07/08/22 1014 157 lb 10.1 oz (71.5 kg)   07/07/22 0600 148 lb 13 oz (67.5 kg)       Vital Signs:   Temperature:  Temp: 97.8 °F (36.6 °C)  TMax: Temp (24hrs), Av °F (36.7 °C), Min:97.5 °F (36.4 °C), Max:98.6 °F (37 °C)    Respirations:  Resp: 20  Pulse:   Heart Rate: 86  BP:    BP: (!) 136/59  BP Range: Systolic (63BDK), WTB:099 , Min:136 , ZKY:553       Diastolic (40FEW), GFQ:89, Min:58, Max:62      Physical Examination:     General:  AAO x 3, speaking in full sentences, no accessory muscle use. HEENT: Atraumatic, normocephalic, no throat congestion, moist mucosa. Eyes:   Pupils equal, round and reactive to light, EOMI. Neck:   Supple  Chest:   Diminished   Cardiac:  S1 S2 irregular, no murmurs, gallops or rubs. Abdomen: Soft, non-tender, no masses or organomegaly, BS audible. :   No suprapubic or flank tenderness. Neuro:  AAO x 3, No FND. SKIN:  No rashes, good skin turgor. Extremities:  +++edema upper arms and lower extremities. Labs:       Recent Labs     07/08/22  0421 07/08/22  0421 07/08/22  2133 07/09/22  0512 07/10/22  0247   WBC 10.2  --   --  9.3 8.9   RBC 2.78*  --   --  2.78* 2.82*   HGB 8.4*   < > 8.2* 8.5* 8.4*   HCT 27.8*   < > 27.5* 28.4* 28.1*   .0  --   --  102.2 99.6   MCH 30.2  --   --  30.6 29.8   MCHC 30.2  --   --  29.9 29.9   RDW 17.2*  --   --  17.0* 16.8*     --   --  196 252   MPV 9.8  --   --  9.6 9.2    < > = values in this interval not displayed.       BMP:   Recent Labs     07/08/22  0421 07/09/22  0512 07/10/22  0247   * 136 135   K 3.9 3.7 3.7   CL 98 101 99   CO2 27 26 24   BUN 31* 17 24*   CREATININE 1.97* 1.48* 2.14*   GLUCOSE 150* 116* 89   CALCIUM 8.1* 8.5* 8.4*     YRIS:      Lab Results   Component Value Date/Time    YRIS NEGATIVE 10/27/2020 04:37 PM     SPEP:  Lab Results   Component Value Date/Time    PROT 5.3 2022 02:59 PM    ALBCAL 2.5 2022 03:00 PM    ALBPCT 48 2022 03:00 PM    LABALPH 0.3 2022 03:00 PM    LABALPH 1.2 2022 03:00 PM    A1PCT 6 2022 03:00 PM    A2PCT 23 2022 03:00 PM    LABBETA 0.7 2022 03:00 PM    BETAPCT 13 06/01/2022 03:00 PM    GAMGLOB 0.5 06/01/2022 03:00 PM    GGPCT 10 06/01/2022 03:00 PM    PATH ELECTRONICALLY SIGNED. Randy Jasso M.D. 06/01/2022 03:00 PM    PATH ELECTRONICALLY SIGNED. Randy Jasso M.D. 06/01/2022 03:00 PM     UPEP:     Lab Results   Component Value Date/Time    LABPE  10/27/2020 04:35 PM     ELEVATED PROTEIN CONCENTRATION. MOST SERUM PROTEINS ARE DETECTED IN THIS URINE.   USUALLY OBSERVED WITH MARKEDLY INCREASED NON-SELECTIVE GLOMERULAR PERMEABILITY (i.e. SEVERE GLOMERULAR DISEASE), CONTAMINATION OF     C3:     Lab Results   Component Value Date/Time    C3 147 10/27/2020 04:37 PM     C4:     Lab Results   Component Value Date/Time    C4 29 10/27/2020 04:37 PM     MPO ANCA:     Lab Results   Component Value Date/Time    MPO 5 10/27/2020 04:37 PM     PR3 ANCA:     Lab Results   Component Value Date/Time    PR3 16 10/27/2020 04:37 PM     Hep BsAg:         Lab Results   Component Value Date/Time    HEPBSAG NONREACTIVE 06/28/2022 02:56 PM     Hep C AB:          Lab Results   Component Value Date/Time    HEPCAB NONREACTIVE 06/28/2022 02:56 PM       Urinalysis/Chemistries:      Lab Results   Component Value Date/Time    NITRU NEGATIVE 06/27/2022 06:35 PM    COLORU Dark Yellow 06/27/2022 06:35 PM    PHUR 5.5 06/27/2022 06:35 PM    WBCUA 20 TO 50 06/27/2022 06:35 PM    RBCUA 20 TO 50 06/27/2022 06:35 PM    MUCUS 1+ 06/27/2022 06:35 PM    TRICHOMONAS NOT REPORTED 02/07/2022 02:42 AM    YEAST NOT REPORTED 02/07/2022 02:42 AM    BACTERIA FEW 06/12/2022 09:33 PM    SPECGRAV 1.030 06/27/2022 06:35 PM    LEUKOCYTESUR SMALL 06/27/2022 06:35 PM    UROBILINOGEN Normal 06/27/2022 06:35 PM    [address] NEGATIVE 06/27/2022 06:35 PM    GLUCOSEU 1+ 06/27/2022 06:35 PM    KETUA TRACE 06/27/2022 06:35 PM    AMORPHOUS NOT REPORTED 02/07/2022 02:42 AM     Urine Sodium:     Lab Results   Component Value Date/Time    SLOAN <20 06/08/2022 09:52 PM      Urine Creatinine:     Lab Results   Component Value Date/Time LABCREA 63.0 06/08/2022 09:52 PM     Radiology:     Reviewed. Assessment:     1. JOY currently HD dependent, likely secondary to ATN from hypotension requiring pressor support vs progression from underlying diabetic nephropathy, getting dialysis as per MWF schedule. Hemodialysis initiated on 6/28/2022. Right IJ tunneled cath placed 7/8/22  2. Hyperkalemia likely from progressive decline in renal function, now improved with dialysis. 3. Nephrotic range proteinuria likely due to diabetic nephropathy. 4. Diabetes type 2.  5. Extubated 7/6/22. 6. Nonhealing lower extremity ulcers. 7. Hypotension did require pressor support this admission. 8. Fluid overload  9. Drop in hemoglobin stable with no overt signs of bleeding. Plan:   1. Plan HD Monday per MWF schedule. No acute need for dialysis today. 2. Monitor strict I/Os and renal function for signs of improvement. 3. BMP in AM.  4.  working towards setting up outpatient dialysis spot. 5. Will follow. Nutrition   Please ensure that patient is on a renal diet/TF. Avoid nephrotoxic drugs/contrast exposure. Attending Physician Statement  I have discussed the care of this patient, including pertinent history and exam findings, with the Resident/CNP. I have reviewed and edited the key elements of all parts of the encounter with the Resident/CNP. I agree with the assessment, plan and orders as documented by the Resident/CNP. Vamsi Denny MD   Nephrology [address] [place] [address] Drive    This note is created with the assistance of a speech-recognition program. While intending to generate a document that actually reflects the content of the visit, no guarantees can be provided that every mistake has been identified and corrected by editing.

## 2022-07-10 NOTE — CARE COORDINATION
Spoke to pt's daughter, June Irene, via telephone. Updated her on SNF denials.  [facility] sent to her email [email or web address] list also left in pt's room

## 2022-07-11 LAB
ABSOLUTE EOS #: 0.08 K/UL (ref 0–0.44)
ABSOLUTE IMMATURE GRANULOCYTE: 0.19 K/UL (ref 0–0.3)
ABSOLUTE LYMPH #: 0.96 K/UL (ref 1.1–3.7)
ABSOLUTE MONO #: 0.82 K/UL (ref 0.1–1.2)
ANION GAP SERPL CALCULATED.3IONS-SCNC: 13 MMOL/L (ref 9–17)
BASOPHILS # BLD: 1 % (ref 0–2)
BASOPHILS ABSOLUTE: 0.06 K/UL (ref 0–0.2)
BUN BLDV-MCNC: 31 MG/DL (ref 8–23)
CALCIUM SERPL-MCNC: 8.6 MG/DL (ref 8.6–10.4)
CHLORIDE BLD-SCNC: 99 MMOL/L (ref 98–107)
CO2: 25 MMOL/L (ref 20–31)
CREAT SERPL-MCNC: 2.81 MG/DL (ref 0.5–0.9)
EOSINOPHILS RELATIVE PERCENT: 1 % (ref 1–4)
GFR AFRICAN AMERICAN: 20 ML/MIN
GFR NON-AFRICAN AMERICAN: 17 ML/MIN
GFR SERPL CREATININE-BSD FRML MDRD: ABNORMAL ML/MIN/{1.73_M2}
GLUCOSE BLD-MCNC: 122 MG/DL (ref 65–105)
GLUCOSE BLD-MCNC: 143 MG/DL (ref 65–105)
GLUCOSE BLD-MCNC: 91 MG/DL (ref 70–99)
HCT VFR BLD CALC: 27.6 % (ref 36.3–47.1)
HEMOGLOBIN: 8.5 G/DL (ref 11.9–15.1)
IMMATURE GRANULOCYTES: 2 %
LYMPHOCYTES # BLD: 11 % (ref 24–43)
MCH RBC QN AUTO: 30.5 PG (ref 25.2–33.5)
MCHC RBC AUTO-ENTMCNC: 30.8 G/DL (ref 28.4–34.8)
MCV RBC AUTO: 98.9 FL (ref 82.6–102.9)
MONOCYTES # BLD: 9 % (ref 3–12)
NRBC AUTOMATED: 0 PER 100 WBC
PDW BLD-RTO: 16.7 % (ref 11.8–14.4)
PLATELET # BLD: 312 K/UL (ref 138–453)
PMV BLD AUTO: 9.5 FL (ref 8.1–13.5)
POTASSIUM SERPL-SCNC: 3.9 MMOL/L (ref 3.7–5.3)
RBC # BLD: 2.79 M/UL (ref 3.95–5.11)
RBC # BLD: ABNORMAL 10*6/UL
SEG NEUTROPHILS: 76 % (ref 36–65)
SEGMENTED NEUTROPHILS ABSOLUTE COUNT: 6.77 K/UL (ref 1.5–8.1)
SODIUM BLD-SCNC: 137 MMOL/L (ref 135–144)
WBC # BLD: 8.9 K/UL (ref 3.5–11.3)

## 2022-07-11 PROCEDURE — 2580000003 HC RX 258: Performed by: STUDENT IN AN ORGANIZED HEALTH CARE EDUCATION/TRAINING PROGRAM

## 2022-07-11 PROCEDURE — 6370000000 HC RX 637 (ALT 250 FOR IP): Performed by: STUDENT IN AN ORGANIZED HEALTH CARE EDUCATION/TRAINING PROGRAM

## 2022-07-11 PROCEDURE — 2700000000 HC OXYGEN THERAPY PER DAY

## 2022-07-11 PROCEDURE — 94760 N-INVAS EAR/PLS OXIMETRY 1: CPT

## 2022-07-11 PROCEDURE — 6360000002 HC RX W HCPCS: Performed by: INTERNAL MEDICINE

## 2022-07-11 PROCEDURE — 6360000002 HC RX W HCPCS: Performed by: STUDENT IN AN ORGANIZED HEALTH CARE EDUCATION/TRAINING PROGRAM

## 2022-07-11 PROCEDURE — 99232 SBSQ HOSP IP/OBS MODERATE 35: CPT | Performed by: FAMILY MEDICINE

## 2022-07-11 PROCEDURE — 80048 BASIC METABOLIC PNL TOTAL CA: CPT

## 2022-07-11 PROCEDURE — 82947 ASSAY GLUCOSE BLOOD QUANT: CPT

## 2022-07-11 PROCEDURE — 90935 HEMODIALYSIS ONE EVALUATION: CPT | Performed by: INTERNAL MEDICINE

## 2022-07-11 PROCEDURE — 2060000000 HC ICU INTERMEDIATE R&B

## 2022-07-11 PROCEDURE — 90935 HEMODIALYSIS ONE EVALUATION: CPT

## 2022-07-11 PROCEDURE — 99232 SBSQ HOSP IP/OBS MODERATE 35: CPT | Performed by: INTERNAL MEDICINE

## 2022-07-11 PROCEDURE — 94640 AIRWAY INHALATION TREATMENT: CPT

## 2022-07-11 PROCEDURE — 6370000000 HC RX 637 (ALT 250 FOR IP): Performed by: FAMILY MEDICINE

## 2022-07-11 PROCEDURE — 6370000000 HC RX 637 (ALT 250 FOR IP): Performed by: INTERNAL MEDICINE

## 2022-07-11 PROCEDURE — 36415 COLL VENOUS BLD VENIPUNCTURE: CPT

## 2022-07-11 PROCEDURE — 85025 COMPLETE CBC W/AUTO DIFF WBC: CPT

## 2022-07-11 RX ORDER — HEPARIN SODIUM 1000 [USP'U]/ML
1600 INJECTION, SOLUTION INTRAVENOUS; SUBCUTANEOUS PRN
Status: DISCONTINUED | OUTPATIENT
Start: 2022-07-11 | End: 2022-07-18 | Stop reason: HOSPADM

## 2022-07-11 RX ORDER — LEVOTHYROXINE SODIUM 0.03 MG/1
25 TABLET ORAL DAILY
Qty: 30 TABLET | Refills: 3 | DISCHARGE
Start: 2022-07-12

## 2022-07-11 RX ORDER — POLYETHYLENE GLYCOL 3350 17 G/17G
17 POWDER, FOR SOLUTION ORAL 2 TIMES DAILY
Status: DISCONTINUED | OUTPATIENT
Start: 2022-07-11 | End: 2022-07-18 | Stop reason: HOSPADM

## 2022-07-11 RX ORDER — AMIODARONE HYDROCHLORIDE 100 MG/1
100 TABLET ORAL DAILY
Qty: 30 TABLET | Refills: 0 | Status: SHIPPED | OUTPATIENT
Start: 2022-07-12

## 2022-07-11 RX ORDER — POLYETHYLENE GLYCOL 3350 17 G/17G
17 POWDER, FOR SOLUTION ORAL 2 TIMES DAILY
Qty: 527 G | Refills: 1 | DISCHARGE
Start: 2022-07-11 | End: 2022-08-10

## 2022-07-11 RX ORDER — HYDROCORTISONE ACETATE 25 MG/1
25 SUPPOSITORY RECTAL 2 TIMES DAILY
DISCHARGE
Start: 2022-07-11

## 2022-07-11 RX ADMIN — METOPROLOL TARTRATE 25 MG: 25 TABLET ORAL at 08:13

## 2022-07-11 RX ADMIN — GUAIFENESIN DEXTROMETHORPHAN HYDROBROMIDE ORAL SOLUTION 10 ML: 200; 20 SOLUTION ORAL at 09:30

## 2022-07-11 RX ADMIN — ALBUTEROL SULFATE 2.5 MG: 2.5 SOLUTION RESPIRATORY (INHALATION) at 08:44

## 2022-07-11 RX ADMIN — SODIUM CHLORIDE, PRESERVATIVE FREE 10 ML: 5 INJECTION INTRAVENOUS at 09:00

## 2022-07-11 RX ADMIN — EPOETIN ALFA-EPBX 5000 UNITS: 10000 INJECTION, SOLUTION INTRAVENOUS; SUBCUTANEOUS at 13:04

## 2022-07-11 RX ADMIN — Medication 81 MG: at 08:16

## 2022-07-11 RX ADMIN — MAGNESIUM GLUCONATE 500 MG ORAL TABLET 400 MG: 500 TABLET ORAL at 08:13

## 2022-07-11 RX ADMIN — APIXABAN 5 MG: 5 TABLET, FILM COATED ORAL at 08:15

## 2022-07-11 RX ADMIN — METOPROLOL TARTRATE 25 MG: 25 TABLET ORAL at 22:49

## 2022-07-11 RX ADMIN — BISACODYL 10 MG: 10 SUPPOSITORY RECTAL at 17:54

## 2022-07-11 RX ADMIN — HEPARIN SODIUM 1600 UNITS: 1000 INJECTION INTRAVENOUS; SUBCUTANEOUS at 13:12

## 2022-07-11 RX ADMIN — HEPARIN SODIUM 1600 UNITS: 1000 INJECTION INTRAVENOUS; SUBCUTANEOUS at 13:11

## 2022-07-11 RX ADMIN — ATORVASTATIN CALCIUM 40 MG: 80 TABLET, FILM COATED ORAL at 08:16

## 2022-07-11 RX ADMIN — ALBUTEROL SULFATE 2.5 MG: 2.5 SOLUTION RESPIRATORY (INHALATION) at 20:44

## 2022-07-11 RX ADMIN — POLYETHYLENE GLYCOL 3350 17 G: 17 POWDER, FOR SOLUTION ORAL at 14:35

## 2022-07-11 RX ADMIN — ALBUTEROL SULFATE 2.5 MG: 2.5 SOLUTION RESPIRATORY (INHALATION) at 16:12

## 2022-07-11 RX ADMIN — MAGNESIUM GLUCONATE 500 MG ORAL TABLET 400 MG: 500 TABLET ORAL at 22:52

## 2022-07-11 RX ADMIN — SODIUM CHLORIDE, PRESERVATIVE FREE 10 ML: 5 INJECTION INTRAVENOUS at 22:54

## 2022-07-11 RX ADMIN — LIDOCAINE HYDROCHLORIDE: 20 JELLY TOPICAL at 17:54

## 2022-07-11 RX ADMIN — PANTOPRAZOLE SODIUM 40 MG: 40 TABLET, DELAYED RELEASE ORAL at 08:13

## 2022-07-11 RX ADMIN — HYDROCORTISONE ACETATE 25 MG: 25 SUPPOSITORY RECTAL at 08:14

## 2022-07-11 RX ADMIN — GUAIFENESIN DEXTROMETHORPHAN HYDROBROMIDE ORAL SOLUTION 10 ML: 200; 20 SOLUTION ORAL at 14:32

## 2022-07-11 RX ADMIN — ANTI-FUNGAL POWDER MICONAZOLE NITRATE TALC FREE: 1.42 POWDER TOPICAL at 22:53

## 2022-07-11 RX ADMIN — ANTI-FUNGAL POWDER MICONAZOLE NITRATE TALC FREE: 1.42 POWDER TOPICAL at 09:30

## 2022-07-11 RX ADMIN — NITROGLYCERIN 0.5 INCH: 20 OINTMENT TOPICAL at 22:51

## 2022-07-11 RX ADMIN — POLYETHYLENE GLYCOL 3350 17 G: 17 POWDER, FOR SOLUTION ORAL at 22:52

## 2022-07-11 RX ADMIN — FERROUS SULFATE TAB EC 325 MG (65 MG FE EQUIVALENT) 325 MG: 325 (65 FE) TABLET DELAYED RESPONSE at 09:00

## 2022-07-11 RX ADMIN — GUAIFENESIN DEXTROMETHORPHAN HYDROBROMIDE ORAL SOLUTION 10 ML: 200; 20 SOLUTION ORAL at 22:49

## 2022-07-11 RX ADMIN — LEVOTHYROXINE SODIUM 25 MCG: 25 TABLET ORAL at 08:13

## 2022-07-11 RX ADMIN — DOCUSATE SODIUM LIQUID 100 MG: 100 LIQUID ORAL at 08:14

## 2022-07-11 RX ADMIN — MORPHINE SULFATE 2 MG: 2 INJECTION, SOLUTION INTRAMUSCULAR; INTRAVENOUS at 01:18

## 2022-07-11 RX ADMIN — APIXABAN 5 MG: 5 TABLET, FILM COATED ORAL at 22:49

## 2022-07-11 RX ADMIN — AMIODARONE HYDROCHLORIDE 100 MG: 200 TABLET ORAL at 08:16

## 2022-07-11 RX ADMIN — SODIUM CHLORIDE, PRESERVATIVE FREE 10 ML: 5 INJECTION INTRAVENOUS at 22:50

## 2022-07-11 ASSESSMENT — ENCOUNTER SYMPTOMS
TROUBLE SWALLOWING: 0
ABDOMINAL DISTENTION: 0
CONSTIPATION: 0
PHOTOPHOBIA: 0
ABDOMINAL PAIN: 0
BLOOD IN STOOL: 0
SORE THROAT: 0
SHORTNESS OF BREATH: 0
NAUSEA: 0
WHEEZING: 0
CHEST TIGHTNESS: 0
DIARRHEA: 0
VOICE CHANGE: 0
VOMITING: 0
COUGH: 1
EYE REDNESS: 0
TROUBLE SWALLOWING: 1

## 2022-07-11 ASSESSMENT — PAIN SCALES - GENERAL
PAINLEVEL_OUTOF10: 0
PAINLEVEL_OUTOF10: 4
PAINLEVEL_OUTOF10: 0

## 2022-07-11 NOTE — PLAN OF CARE
Patient on Ra,lungs sound diminished. VSS,afebrile,abd.soft,dysphagia diet. AxOx3-4,general weakness,move all extremities,pulses present,warm to touch. Dialysis done today.   Problem: Discharge Planning  Goal: Discharge to home or other facility with appropriate resources  Outcome: Progressing

## 2022-07-11 NOTE — CARE COORDINATION
KARLY following for HD needs. Received call from Linnea Jones with [facility]. Patient accepted for TTS 11:15am chair time beginning 7/14/22. Linnea Jones requested most recent HD orders. SW faxed information to clinic. Await SNF placement.

## 2022-07-11 NOTE — CARE COORDINATION
[facility] Flow/Interdisciplinary Rounds Progress Note    Quality Flow Rounds held on July 11, 2022 at [address] [facility] Attending:  Bedside Nurse,  and Nursing Unit Leadership        Readmission Risk              Risk of Unplanned Readmission:  57           Discussed patient goal for the day, patient clinical progression, and barriers to discharge. The following Goal(s) of the Day/Commitment(s) have been identified:    vm from [facility] ins is oon  Daughter called wants referral [facility]  dialysis arrangements made [facility].  Patient accepted for TTS 11:15am chair time beginning 7/14/22    15:50 called kellee with [facility]  Called margoth with [facility]   Called daughter Amalia Earing to update    Carlene Villagomez RN  July 11, 2022

## 2022-07-11 NOTE — PROGRESS NOTES
[facility]  Office: [address], DO, Nii Prophet, DO, Stephany Kerns, DO, Merlin Louise Blood, DO, Vanessa Edward MD, Anh Farias MD, Lynda Daniels MD, Steven Louis MD, Doug Ramos MD, Lia Garcse MD, Diana Muniz MD, Jesus Muniz, DO, Arlet Neff MD,  Claudetta Shilling, DO, Saadia Mi MD, Chantal Lindsey MD, Tyree Pimentel, DO, Alexandrea Calles MD, Alanna Rolle MD, Laureen Camarillo DO, Guilherme Moore MD, Whit Gamboa MD, Luis Manuel Russell, [facility], [facility], [facility], Carmenza Dawson, CNP, Penny Huertas, CNP, Clive Schmidt, CNP, Khanh Bonilla, CNP, SEBASTIAN Nunez-C, Albertina Argueta, [facility], Ashley Monique, CNP, Elba Rob, CNP, Jaye Hansen, CNP, Kalpana Hamm, CNS, Acosta Castaneda, [facility], Brooke Dover, CNP, Jimena Nguyen, CNP, Flor Devine, [address] [place] [address]    Progress Note    7/11/2022    1:20 PM    Name:   Mariajose Vasquez  MRN:     [id number]     Acct:      [de-identified]   Room:   2019/2019-01  IP Day:  15  Admit Date:  6/27/2022 12:28 PM    PCP:   Felipe Morris MD  Code Status:  Full Code    Subjective:     C/C:   Chief Complaint   Patient presents with    Bradycardia     Interval History Status:  improving    Patient seen and examined at dialysis , no acute events overnight.   confusion wax and wane just slow to respond  C/O pain with defecation , has Hemorrhoids, topicals added yesterday   Has scheduled swallow study today  Afebrile overnight  Patient vitals, labs and all providers notes were reviewed,from overnight shift and morning updates were noted and discussed with the nurse    Brief History:     76-year-old with prior history of CKD stage IV( baseline creatinine 1.8-2), chronic diastolic CHF, hypertension, CAD s/p CABG in 2003 and occluded SVG to OM 2 and cath in 2006, COPD, type 2 diabetes, A. fib on amiodarone and Eliquis  Presented to ED on 6/27/2021 with complaints of shortness of breath hypoxia improved on 90s, and fatigue. Was found to be bradycardic heart rate in 35 by EMS and was placed on transcutaneous pacing and received atropine. She was subsequently started on epinephrine drip and admitted to the ICU. X-ray consistent with pulmonary edema. Cardiology was consulted and switched to dopamine drip, and Eliquis was switched to heparin WBP. Was also noted to be hyperkalemic and started on hemodialysis sec to worsening CKD on 06/28/22. Neurology was consulted for encephalopathy : noted to have right anterior temporal fossa meningioma with possible edema. MRI considered nonemergent and deferred.,   ID was consulted for leukocytosis and b/l LE wounds which are chronic(sees wound care clinic outpatient) and Right gluteal /coccygeal ulcerations. Started on Cefepime.   -On 6/28/2022 she had progressively worsening mentation with worsening hypoxia and hypercapnia she was intubated and started on HD. Heparin was held on 6/29-07/01 secondary to oozing at right IJ dialysis catheter site. Patient was continued on empiric antibiotics through 7/1/22(urine and blood cx NG), dialysis/UF and subsequently extubated on 07/06/22. Received 1u PRBC 7/7/22 for hb 7.0  Today she is on 2l NC O2, K 3.9, bicarb 27, Hb 8.4, lethargic, arousable but falling asleep easily, appears tired. Review of Systems:     Review of Systems   Constitutional: Positive for activity change and appetite change. Negative for chills, diaphoresis and fever. HENT: Positive for trouble swallowing. Negative for congestion. Eyes: Negative for visual disturbance. Respiratory: Positive for cough. Negative for chest tightness, shortness of breath and wheezing. Cardiovascular: Negative for chest pain, palpitations and leg swelling. Gastrointestinal: Negative for abdominal pain, blood in stool, constipation, diarrhea, nausea and vomiting. Genitourinary: Negative for difficulty urinating.    Neurological: Negative for dizziness, weakness, light-headedness, numbness and headaches. Psychiatric/Behavioral: Positive for confusion (resolving). All other systems reviewed and are negative. Medications: Allergies:     Allergies   Allergen Reactions    Latex Rash    Aleve [Naproxen Sodium]      Chronic kidney disease stage III, CHF    Pioglitazone Other (See Comments)     Congestive heart failure    Claritin [Loratadine]     Keflex [Cephalexin]     Lisinopril      Needs clarification of contraindication       Current Meds:   Scheduled Meds:    polyethylene glycol  17 g Oral BID    hydrocortisone  25 mg Rectal BID    pantoprazole  40 mg Oral QAM AC    apixaban  5 mg Oral BID    metoprolol tartrate  25 mg Oral BID    dextromethorphan-guaiFENesin  10 mL Oral TID    epoetin rick-epbx  5,000 Units IntraVENous Q MWF    docusate  100 mg Oral Daily    levothyroxine  25 mcg Oral Daily    sodium chloride flush  5-40 mL IntraVENous 2 times per day    amiodarone  100 mg Oral Daily    rocuronium  0.6 mg/kg IntraVENous Once    albuterol  2.5 mg Nebulization Q6H    sodium chloride flush  5-40 mL IntraVENous 2 times per day    aspirin EC  81 mg Oral Daily    atorvastatin  40 mg Oral Daily    magnesium oxide  400 mg Oral BID    miconazole   Topical BID    ferrous sulfate  325 mg Oral Daily with breakfast     Continuous Infusions:    sodium chloride      sodium chloride      sodium chloride      dextrose      sodium chloride 75 mL/hr at 07/06/22 2000     PRN Meds: heparin (porcine), heparin (porcine), lidocaine, nitroglycerin, bisacodyl, LORazepam, sodium chloride, albumin human, sodium chloride flush, sodium chloride, sodium chloride, fentanNYL, glucose, dextrose bolus **OR** dextrose bolus, glucagon (rDNA), dextrose, sodium chloride flush, sodium chloride, ondansetron **OR** ondansetron, acetaminophen **OR** acetaminophen, morphine    Data:     Past Medical History:   has a past medical history of Acute CVA (cerebrovascular accident) [facility]), Acute kidney injury superimposed on chronic kidney disease ([facility] [address] Acute on chronic combined systolic (congestive) and diastolic (congestive) heart failure ([address] JOY (acute kidney injury) ([address] Altered mental status, Anticoagulated, Arthritis, Asthma, Bradycardia, Brain mass, Cellulitis and abscess, Cellulitis and abscess of unspecified site, Cellulitis of both lower extremities, Cellulitis of left lower extremity, Cellulitis of lower extremity, CHF (congestive heart failure) ([address] Chronic kidney disease, unspecified, CKD stage 4 secondary to hypertension ([address] Coronary atherosclerosis of native coronary artery, Elevated LFTs, Essential hypertension, Essential hypertension, malignant, Hallucinations, Hard of hearing, Head contusion, Hypertensive emergency, Hypertensive urgency with joy, Hypokalemia, Hypomagnesemia, Iron deficiency anemia, unspecified, Meningioma ([address] Myocardial infarction ([address] Other and unspecified hyperlipidemia, Pure hypercholesterolemia, Stage 4 chronic kidney disease ([address] Toxic metabolic encephalopathy, Type 2 diabetes mellitus with stage 4 chronic kidney disease, without long-term current use of insulin ([address] Type II or unspecified type diabetes mellitus without mention of complication, not stated as uncontrolled, Unspecified asthma(493.90), Unspecified venous (peripheral) insufficiency, Unspecified vitamin D deficiency, and UTI (urinary tract infection). Social History:   reports that she quit smoking about 22 years ago. She has a 2.50 pack-year smoking history. She has never used smokeless tobacco. She reports previous alcohol use. She reports that she does not use drugs.      Family History:   Family History   Problem Relation Age of Onset    Diabetes Mother     Heart Disease Mother     Heart Disease Father     Diabetes Sister     High Blood Pressure Sister     Diabetes Maternal Grandmother        Vitals:  BP (!) 135/101   Pulse 72   Temp 97.9 °F (36.6 °C)   Resp 17   Ht 4' 11\" (1.499 m)   Wt 144 lb 10 oz (65.6 kg)   SpO2 96%   BMI 29.21 kg/m²   Temp (24hrs), Av.2 °F (36.8 °C), Min:97.7 °F (36.5 °C), Max:99.3 °F (37.4 °C)    Recent Labs     07/08/22  2104 07/10/22  0837 07/10/22  1137 07/10/22  1547   POCGLU 137* 85 107* 115*       I/O (24Hr):     Intake/Output Summary (Last 24 hours) at 2022 1320  Last data filed at 7/10/2022 1840  Gross per 24 hour   Intake 360 ml   Output --   Net 360 ml       Labs:  Hematology:  Recent Labs     22  0512 07/10/22  0247 07/11/22  0626   WBC 9.3 8.9 8.9   RBC 2.78* 2.82* 2.79*   HGB 8.5* 8.4* 8.5*   HCT 28.4* 28.1* 27.6*   .2 99.6 98.9   MCH 30.6 29.8 30.5   MCHC 29.9 29.9 30.8   RDW 17.0* 16.8* 16.7*    252 312   MPV 9.6 9.2 9.5     Chemistry:  Recent Labs     22  0512 07/10/22  0247 07/11/22  0626    135 137   K 3.7 3.7 3.9    99 99   CO2 26 24 25   GLUCOSE 116* 89 91   BUN 17 24* 31*   CREATININE 1.48* 2.14* 2.81*   ANIONGAP 9 12 13   LABGLOM 35* 23* 17*   GFRAA 42* 27* 20*   CALCIUM 8.5* 8.4* 8.6     Recent Labs     22  1617 07/08/22  2104 07/10/22  0837 07/10/22  1137 07/10/22  1547   POCGLU 133* 137* 85 107* 115*     ABG:  Lab Results   Component Value Date/Time    POCPH 7.411 2022 04:40 AM    PHART 7.459 2022 04:28 AM    POCPCO2 44.8 2022 04:40 AM    YSB8DZZ 39.9 2022 04:28 AM    POCPO2 92.6 2022 04:40 AM    PO2ART 107.0 2022 04:28 AM    POCHCO3 28.5 2022 04:40 AM    TVS6FPR 28.3 2022 04:28 AM    NBEA 5 2022 05:36 PM    PBEA 4 2022 04:40 AM    KOUK8PTA 97 2022 04:40 AM    X0IRBYGW 97.4 2022 04:28 AM    FIO2 30.0 2022 04:40 AM     Lab Results   Component Value Date/Time    SPECIAL NOT GIVEN 2022 01:11 PM     Lab Results   Component Value Date/Time    CULTURE NO GROWTH 2 DAYS 2022 05:12 AM    CULTURE NO GROWTH 2 DAYS 2022 05:12 AM       Radiology:  XR CHEST PORTABLE    Result Date: 7/3/2022  Mild increased perihilar edema, mild increased perihilar opacification, likely edema and CHF. Persistent small effusions and bibasilar atelectasis. Satisfactory position of endotracheal tube. IR TUNNELED CVC PLACE WO SQ PORT/PUMP > 5 YEARS    Result Date: 7/8/2022  Successful ultrasound and fluoroscopy guided tunneled catheter placement . Okay to use. Physical Examination:        Physical Exam  Vitals and nursing note reviewed. Constitutional:       General: She is not in acute distress. Interventions: Nasal cannula in place. HENT:      Head: Normocephalic and atraumatic. Eyes:      Conjunctiva/sclera: Conjunctivae normal.      Pupils: Pupils are equal, round, and reactive to light. Cardiovascular:      Rate and Rhythm: Normal rate and regular rhythm. Heart sounds: No murmur heard. Pulmonary:      Effort: Pulmonary effort is normal. No accessory muscle usage or respiratory distress. Breath sounds: Transmitted upper airway sounds present. No stridor. Rhonchi and rales present. No decreased breath sounds or wheezing. Chest:      Comments: Tunneled catheter noted   Abdominal:      General: Bowel sounds are normal. There is no distension. Palpations: Abdomen is soft. Abdomen is not rigid. Tenderness: There is no abdominal tenderness. There is no guarding. Musculoskeletal:         General: No tenderness. Skin:     General: Skin is warm and dry. Findings: No erythema, lesion or rash. Neurological:      Mental Status: She is alert. She is disoriented. Cranial Nerves: No cranial nerve deficit. Motor: No seizure activity. Psychiatric:         Speech: Speech normal.         Behavior: Behavior normal. Behavior is cooperative.          Assessment:        Hospital Problems           Last Modified POA    * (Principal) Bradycardia 6/27/2022 Yes    Lymphedema 6/28/2022 Yes    Acute kidney injury superimposed on CKD ([facility] [address] 6/28/2022 Yes    Hyperkalemia 6/28/2022 Yes    Shock ([address] 6/28/2022 Yes    Leukocytosis 6/28/2022 Yes    Acute respiratory failure with hypoxia and hypercapnia ([address] 6/28/2022 Yes    Pulmonary edema cardiac cause ([address] 6/28/2022 Yes    Dependence on renal dialysis ([address] 7/5/2022 Yes    Nephrotic range proteinuria 7/5/2022 Yes    Arterial hypotension 7/5/2022 Yes    External hemorrhoid 7/10/2022 Yes    Dysphagia 7/10/2022 Yes    Acute on chronic diastolic congestive heart failure ([address] 6/28/2022 Yes    JOY (acute kidney injury) ([address] 7/5/2022 Yes          Plan:        Principal Problem:    Bradycardia  Active Problems:    Lymphedema    Acute kidney injury superimposed on CKD (HCC)    Hyperkalemia    Shock (HCC)    Leukocytosis    Acute respiratory failure with hypoxia and hypercapnia (HCC)    Pulmonary edema cardiac cause (HCC)    Dependence on renal dialysis (HCC)    Nephrotic range proteinuria    Arterial hypotension    External hemorrhoid    Dysphagia    Acute on chronic diastolic congestive heart failure (HCC)    JOY (acute kidney injury) ([address]  Resolved Problems:    * No resolved hospital problems. *    - Complicated course needing ICU stay, needed drips, and initiation of HD due to hyperkalemia in a progressing kidney disease   - S/P  tunneled catheter placement 7/8.    -Hemodialysis management as per nephrology.  -Encephalopathy is improving  -Given hemoglobin remains stable and no further procedures planned will  switch to oral anticoagulation and DC   -Continue aspirin, statin, beta-blocker,Amiodarone.  -Continue Synthroid at home dose.   - Continue chronic wound care ( POA)   - On supplemental O2 continue to titrate as tolerated  - Has a video swallow study today, passed with dysphagia diet soft, bite size   - Seroquel discontinued  - Added Anusol, Nitrobid topical and topical lidocaine jelly  - GI prophylaxis.       I reviewed hospital course including labs, images and notes    - Discussed with the patient

## 2022-07-11 NOTE — PROGRESS NOTES
Occupational [address]  Occupational Therapy Not Seen Note    DATE: 2022    NAME: Korin Hunter  MRN: [id number]   : 1950      Patient not seen this date for Occupational Therapy due to:    Pt is away from the floor for Hemodialysis. OT will re-attempt for OT Evaluation at later time / date, as appropriate.         Electronically signed by HEIKE Jurado OTR/L on 2022 at 11:04 AM

## 2022-07-11 NOTE — PROGRESS NOTES
History:  22  Seen and examined   No acute events       Review Of Systems:  Review of Systems   Constitutional: Positive for activity change. Negative for chills and fever. HENT: Negative for postnasal drip, sore throat, trouble swallowing and voice change. Eyes: Negative for photophobia, redness and visual disturbance. Respiratory: Positive for cough. Negative for shortness of breath and wheezing. Cardiovascular: Negative for chest pain, palpitations and leg swelling. Gastrointestinal: Negative for abdominal distention, abdominal pain, constipation, diarrhea and vomiting. Endocrine: Negative for polydipsia, polyphagia and polyuria. Neurological: Negative for dizziness, syncope, speech difficulty and numbness. OBJECTIVE     PaO2/FiO2 RATIO:  No results for input(s): POCPO2 in the last 72 hours. FiO2 : 28 %     VITAL SIGNS:   LAST:  BP (!) 135/101   Pulse 72   Temp 97.9 °F (36.6 °C)   Resp 17   Ht 4' 11\" (1.499 m)   Wt 144 lb 10 oz (65.6 kg)   SpO2 96%   BMI 29.21 kg/m²   8-24 HR RANGE:  TEMP Temp  Av.2 °F (36.8 °C)  Min: 97.7 °F (36.5 °C)  Max: 99.3 °F (83.9 °C)   BP Systolic (71UWZ), GZN:163 , Min:93 , GPB:253      Diastolic (66BCG), LEON:79, Min:54, Max:101     PULSE Pulse  Av.7  Min: 71  Max: 96   RR Resp  Av.5  Min: 17  Max: 20   O2 SAT SpO2  Av %  Min: 88 %  Max: 96 %   OXYGEN DELIVERY O2 Flow Rate (L/min)  Av L/min  Min: 1 L/min  Max: 1 L/min        Systemic Examination:   Physical Exam -  Constitutional: Alert and awake look comfortable no distress   Mental Status:  Oriented to person, place and time and normal affect. Lungs:  Bilateral air entry present, lung fields clear. Normal effort. Slightly decreased at bases mild upper airway sound present  Heart: Irregularly irregular rhythm, no murmur. Abdomen:  Soft, nontender, nondistended, normal bowel sounds.   Extremities: Positive edema of upper extremities, Ace wrap present in both lower extremities. Skin:  Warm, dry, no gross lesions or rashes. DATA REVIEW     Medications: Current Inpatient  Scheduled Meds:   polyethylene glycol  17 g Oral BID    hydrocortisone  25 mg Rectal BID    pantoprazole  40 mg Oral QAM AC    apixaban  5 mg Oral BID    metoprolol tartrate  25 mg Oral BID    dextromethorphan-guaiFENesin  10 mL Oral TID    epoetin rick-epbx  5,000 Units IntraVENous Q MWF    docusate  100 mg Oral Daily    levothyroxine  25 mcg Oral Daily    sodium chloride flush  5-40 mL IntraVENous 2 times per day    amiodarone  100 mg Oral Daily    rocuronium  0.6 mg/kg IntraVENous Once    albuterol  2.5 mg Nebulization Q6H    sodium chloride flush  5-40 mL IntraVENous 2 times per day    aspirin EC  81 mg Oral Daily    atorvastatin  40 mg Oral Daily    magnesium oxide  400 mg Oral BID    miconazole   Topical BID    ferrous sulfate  325 mg Oral Daily with breakfast     Continuous Infusions:   sodium chloride      sodium chloride      sodium chloride      dextrose      sodium chloride 75 mL/hr at 07/06/22 2000       INPUT/OUTPUT:  In: 360 [P.O.:360]  Out: -        LABS:  ABGs:   No results for input(s): POCPH, POCPCO2, POCPO2, POCHCO3, NDQH0XDI in the last 72 hours. CBC:   Recent Labs     07/08/22  2133 07/09/22  0512 07/10/22  0247 07/11/22  0626   WBC  --  9.3 8.9 8.9   HGB 8.2* 8.5* 8.4* 8.5*   HCT 27.5* 28.4* 28.1* 27.6*   MCV  --  102.2 99.6 98.9   PLT  --  196 252 312   LYMPHOPCT  --  11* 12* 11*   RBC  --  2.78* 2.82* 2.79*   MCH  --  30.6 29.8 30.5   MCHC  --  29.9 29.9 30.8   RDW  --  17.0* 16.8* 16.7*     CRP:   No results for input(s): CRP in the last 72 hours. LDH:   No results for input(s): LDH in the last 72 hours.   BMP:   Recent Labs     07/09/22  0512 07/10/22  0247 07/11/22  0626    135 137   K 3.7 3.7 3.9    99 99   CO2 26 24 25   BUN 17 24* 31*   CREATININE 1.48* 2.14* 2.81*   GLUCOSE 116* 89 91     Liver Function Test:   No results for input(s): PROT, LABALBU, ALT, AST, GGT, ALKPHOS, BILITOT in the last 72 hours. Coagulation Profile:   Recent Labs     07/08/22  2133 07/09/22  0745 07/09/22  1925 07/10/22  0247 07/10/22  0851   APTT 65.5* 76.4* 82.7* 55.3* 51.2*     D-Dimer:  No results for input(s): DDIMER in the last 72 hours. Lactic Acid:  No results for input(s): LACTA in the last 72 hours. Cardiac Enzymes:  No results for input(s): CKTOTAL, CKMB, CKMBINDEX, TROPONINI in the last 72 hours. Invalid input(s): TROPONIN, HSTROP  BNP/ProBNP:   No results for input(s): BNP, PROBNP in the last 72 hours. Triglycerides:  No results for input(s): TRIG in the last 72 hours. Microbiology:  Urine Culture:  No components found for: CURINE  Blood Culture:  No components found for: CBLOOD, CFUNGUSBL  Sputum Culture:  No components found for: CSPUTUM  Recent Labs     07/09/22  [address] [facility],[place] [address] . BLOOD  . BLOOD   CULTURE NO GROWTH 2 DAYS  NO GROWTH 2 DAYS     Recent Labs     07/09/22  0512   SPECDESC . BLOOD  . BLOOD   CULTURE NO GROWTH 2 DAYS  NO GROWTH 2 DAYS        Pathology:    Radiology Reports:  XR CHEST PORTABLE   Preliminary Result   Findings are compatible with pulmonary edema. Bilateral pleural effusions. FL MODIFIED BARIUM SWALLOW W VIDEO   Final Result   1. Trace flash penetration without aspiration with the thin liquid substance   by straw. 2. No penetration or aspiration with the remainder of the administered   substances. Please see separate speech pathology report for full discussion of findings   and recommendations. IR TUNNELED CVC PLACE WO SQ PORT/PUMP > 5 YEARS   Final Result   Successful ultrasound and fluoroscopy guided tunneled catheter placement . Okay to use. XR CHEST PORTABLE   Final Result   Mild increased perihilar edema, mild increased perihilar opacification,   likely edema and CHF. Persistent small effusions and bibasilar atelectasis. Satisfactory position of endotracheal tube.          XR CHEST PORTABLE   Final Result   Stable support lines      Slight progression in now moderate diffuse bilateral interstitial infiltrates   related to increasing edema and/or infection with moderate bilateral pleural   effusions         XR CHEST PORTABLE   Final Result   Scattered infiltrates with bilateral effusions. Support tubes as described above. US RENAL COMPLETE   Final Result   Chronic renal disease. Small renal cysts. 1.4 cm right renal stone. No   hydronephrosis. IR NONTUNNELED VASCULAR CATHETER > 5 YEARS   Final Result   Successful ultrasound and fluoroscopy guided 13.5 Sami by 15 cm non   tunneled hemodialysis catheter placement. Ready for use. XR CHEST PORTABLE   Final Result   1. Endotracheal tube with the tip just entering the right mainstem bronchus. Recommend retraction by 4 cm. 2.  Right lower lobe airspace consolidation and small bilateral pleural   effusions. These findings were discussed with the inpatient nurse Luke Womack at 1637   hours on 28 June 2022         XR ABDOMEN FOR NG/OG/NE TUBE PLACEMENT   Final Result   NG tube position, as detailed         XR CHEST PORTABLE   Final Result   Slight improvement in still moderate diffuse bilateral interstitial   infiltrates related to improving pulmonary edema with decreasing still   moderate right and mild to moderate left pleural effusions         CT HEAD WO CONTRAST   Final Result   No acute intracranial hemorrhage or hydrocephalus. Background mild chronic   small vessel white matter changes with remote lacunar insults in the right   caudate and left basal ganglia, unchanged from prior. 2.6 cm anterior right temporal meningioma with suggested mild increase in the   vasogenic edema along the anterior right temporal lobe relative to 4 months   prior. This could be further assessed with a contrast-enhanced brain MRI.          XR CHEST PORTABLE   Final Result   Mixed interstitial and alveolar opacities throughout both lungs, increased   from 3 weeks prior. Correlate for edema or contributory infection. Bilateral small left greater than right pleural effusions which are also   increased from 3 weeks prior. Unchanged cardiomegaly. Echocardiogram:   Results for orders placed during the hospital encounter of 02/06/22    ECHO Complete 2D W Doppler W Color    Narrative  [address]    Transthoracic Echocardiography Report (TTE)    Patient Name [address]       Date of Study                 02/07/2022  [facility]    Date of      1950  Gender                        Female  Birth    Age          66 year(s)  Race                              Room Number  2090        Height:                       58.66 inch, 149 cm    Corporate ID [id number]    Weight:                       132 pounds, 60 kg  #    Patient Acct [de-identified]   BSA:           1.54 m^2       BMI:      27.03  #                                                                kg/m^2    MR #         [id number]      Sonographer                   Saira Price    Accession #  [id number]  Interpreting Physician        Bernadette Young    Fellow                   Referring Nurse Practitioner    Interpreting             Referring Physician           Jann Duong  Fellow                                                 Bhavin Lai    Type of Study    TTE procedure:2D Echocardiogram, M-Mode, Doppler, Color Doppler. Procedure Date  Date: 02/07/2022 Start: 11:37 AM    Study Location: [address] [place] [address]  Technical Quality: Fair visualization    Indications:Congestive heart failure. History / Tech. Comments:  CABG DM CKD COPD HTN AF    Patient Status: Inpatient    Height: 58.66 inches Weight: 132.3 pounds BSA: 1.54 m^2 BMI: 27.03 kg/m^2    Rhythm: Within normal limits    Allergies  - Lisinopril. - Keflex. - *Unlisted:(Aleve,Claratin, Pioglitazone).     CONCLUSIONS    Summary  Normal left ventricle size and function with an estimated EF > 55%. No segmental wall motion abnormalities seen. Moderate left ventricular hypertrophy. Normal right ventricular size and function. Left atrial dilatation. Aortic valve is trileaflet. Mild aortic stenosis. Mild-moderate aortic insufficiency. Normal aortic root dimension. Mitral annular calcification is seen. Mild mitral regurgitation. Mild tricuspid regurgitation. Normal right ventricular systolic pressure. No significant pericardial effusion is seen. Pleural effusion present. Signature  ----------------------------------------------------------------------------  Electronically signed by Saira Price(Sonographer) on 02/07/2022 12:16  PM  ----------------------------------------------------------------------------    ----------------------------------------------------------------------------  Electronically signed by Jose Morales(Interpreting physician) on  02/07/2022 12:19 PM  ----------------------------------------------------------------------------  FINDINGS  Left Atrium  Left atrial dilatation. Left Ventricle  Normal left ventricle size and function with an estimated EF > 55%. No segmental wall motion abnormalities seen. Moderate left ventricular hypertrophy. Right Atrium  Right atrial dilatation. Right Ventricle  Normal right ventricular size and function. Mitral Valve  Mitral annular calcification is seen. Mild mitral regurgitation. Aortic Valve  Aortic valve is trileaflet. Mild aortic stenosis. Mild-moderate aortic insufficiency. Tricuspid Valve  Normal tricuspid valve structure and function. Mild tricuspid regurgitation. Normal right ventricular systolic pressure. Pulmonic Valve  The pulmonic valve is normal in structure. Mild pulmonic insufficiency. Pericardial Effusion  No significant pericardial effusion is seen. Pleural Effusion  Pleural effusion present. Miscellaneous  Normal aortic root dimension.   E/e' average 16.5  IVC normal diameter & inspiratory collapse indicating normal RA filling  pressure . M-mode / 2D Measurements & Calculations:    LVIDd:4 cm(3.7 - 5.6 cm)         Diastolic ETORAW:86 ml  KVCWI:8.59 cm(2.2 - 4.0 cm)      Systolic COLHMY:43.5 ml  GAHV:4.33 cm(0.6 - 1.1 cm)       Aortic Root:2.9 cm(2.0 - 3.7 cm)  LVPWd:1.53 cm(0.6 - 1.1 cm)      LA Dimension: 5.4 cm(1.9 - 4.0 cm)  Fractional Shortenin %       LA volume/Index: 56.5 ml /37m^2  Calculated LVEF (%): 73.75 %     LVOT:2.2 cm    Mitral:                                 Aortic    Valve Area (P1/2-Time): 2.68 cm^2       Peak Velocity: 2.09 m/s  Peak E-Wave: 1.24 m/s                   Mean Velocity: 1.34 m/s  Peak A-Wave: 0.92 m/s                   Peak Gradient: 17.47 mmHg  E/A Ratio: 1.35                         Mean Gradient: 9 mmHg  Peak Gradient: 6.15 mmHg                AI P1/2t: 325 msec  Mean Gradient: 3 mmHg  Deceleration Time: 169 msec             Area (continuity): 1.84 cm^2  P1/2t: 82 msec                          AV VTI: 51.2 cm    Area (continuity): 2.32 cm^2  Mean Velocity: 0.85 m/s    Tricuspid:    Peak TR Velocity: 3.03 m/s  Peak TR Gradient: 36.7236 mmHg    Septal Wall E' velocity:0.07 m/s  Lateral Wall E' velocity:0.09 m/s       ASSESSMENT AND PLAN     Assessment:    //Bradycardia  //Chronic lymphedema  //JOY on CKD, hyperkalemia requiring hemodialysis  //Status post tunneled catheter placement  //Metabolic encephalopathy  //Acute hypoxic hypercapnic respiratory failure  //Acute on chronic CHF  //High risk of aspiration  //Leukocytosis -unclear etiology, infectious dease following      Plan: On dysphagia diet   continue is and deep breathing   HD today     Electronically signed by Sd Prather MD on 2022 at 1:20 PM     Please note that this chart was generated using voice recognition Dragon dictation software.   Although every effort was made to ensure the accuracy of this automated transcription, some errors in transcription may have

## 2022-07-11 NOTE — PROGRESS NOTES
PRN  nitroglycerin (NITRO-BID) 2 % ointment 0.5 inch, BID PRN  bisacodyl (DULCOLAX) suppository 10 mg, Daily PRN  metoprolol tartrate (LOPRESSOR) tablet 25 mg, BID  LORazepam (ATIVAN) injection 0.5 mg, Q6H PRN  dextromethorphan-guaiFENesin (ROBITUSSIN-DM)  MG/5ML liquid 10 mL, TID  epoetin rick-epbx (RETACRIT) injection 5,000 Units, Q MWF  docusate (COLACE) 50 MG/5ML liquid 100 mg, Daily  0.9 % sodium chloride infusion, PRN  albumin human 25 % IV solution 25 g, PRN  levothyroxine (SYNTHROID) tablet 25 mcg, Daily  sodium chloride flush 0.9 % injection 5-40 mL, 2 times per day  sodium chloride flush 0.9 % injection 5-40 mL, PRN  0.9 % sodium chloride infusion, PRN  0.9 % sodium chloride infusion, PRN  sodium polystyrene (KAYEXALATE) 15 GM/60ML suspension 15 g, Once  amiodarone (CORDARONE) tablet 100 mg, Daily  rocuronium (ZEMURON) injection 41 mg, Once  fentaNYL (SUBLIMAZE) injection 50 mcg, Q2H PRN  albuterol (PROVENTIL) nebulizer solution 2.5 mg, Q6H  heparin (porcine) injection 1,300 Units, PRN  heparin (porcine) injection 1,200 Units, PRN  glucose chewable tablet 16 g, PRN  dextrose bolus 10% 125 mL, PRN   Or  dextrose bolus 10% 250 mL, PRN  glucagon (rDNA) injection 1 mg, PRN  dextrose 5 % solution, PRN  sodium chloride flush 0.9 % injection 5-40 mL, 2 times per day  sodium chloride flush 0.9 % injection 5-40 mL, PRN  0.9 % sodium chloride infusion, PRN  ondansetron (ZOFRAN-ODT) disintegrating tablet 4 mg, Q8H PRN   Or  ondansetron (ZOFRAN) injection 4 mg, Q6H PRN  polyethylene glycol (GLYCOLAX) packet 17 g, Daily PRN  acetaminophen (TYLENOL) tablet 650 mg, Q6H PRN   Or  acetaminophen (TYLENOL) suppository 650 mg, Q6H PRN  aspirin EC tablet 81 mg, Daily  atorvastatin (LIPITOR) tablet 40 mg, Daily  magnesium oxide (MAG-OX) tablet 400 mg, BID  miconazole (MICOTIN) 2 % powder, BID  ferrous sulfate (FE TABS 325) EC tablet 325 mg, Daily with breakfast  morphine (PF) injection 2 mg, Q4H PRN          LABS CBC:   Recent Labs     07/09/22  0512 07/10/22  0247 07/11/22  0626   WBC 9.3 8.9 8.9   RBC 2.78* 2.82* 2.79*   HGB 8.5* 8.4* 8.5*   HCT 28.4* 28.1* 27.6*   .2 99.6 98.9   MCH 30.6 29.8 30.5   MCHC 29.9 29.9 30.8   RDW 17.0* 16.8* 16.7*    252 312   MPV 9.6 9.2 9.5      BMP:   Recent Labs     07/09/22  0512 07/10/22  0247 07/11/22 0626    135 137   K 3.7 3.7 3.9    99 99   CO2 26 24 25   BUN 17 24* 31*   CREATININE 1.48* 2.14* 2.81*   GLUCOSE 116* 89 91   CALCIUM 8.5* 8.4* 8.6          SPEP:   Lab Results   Component Value Date/Time    PROT 5.3 06/27/2022 02:59 PM    ALBCAL 2.5 06/01/2022 03:00 PM    ALBPCT 48 06/01/2022 03:00 PM    LABALPH 0.3 06/01/2022 03:00 PM    LABALPH 1.2 06/01/2022 03:00 PM    A1PCT 6 06/01/2022 03:00 PM    A2PCT 23 06/01/2022 03:00 PM    LABBETA 0.7 06/01/2022 03:00 PM    BETAPCT 13 06/01/2022 03:00 PM    GAMGLOB 0.5 06/01/2022 03:00 PM    GGPCT 10 06/01/2022 03:00 PM    PATH ELECTRONICALLY SIGNED. Randy Jasso M.D. 06/01/2022 03:00 PM    PATH ELECTRONICALLY SIGNED.  Randy Jasso M.D. 06/01/2022 03:00 PM     UPEP:   Lab Results   Component Value Date/Time     10/27/2020 04:35 PM      HEPBSAG:  Lab Results   Component Value Date/Time    HEPBSAG NONREACTIVE 06/28/2022 02:56 PM     HEPCAB:  Lab Results   Component Value Date/Time    HEPCAB NONREACTIVE 06/28/2022 02:56 PM     C3:   Lab Results   Component Value Date    C3 147 10/27/2020     C4:   Lab Results   Component Value Date    C4 29 10/27/2020     MPO ANCA:   Lab Results   Component Value Date/Time    MPO 5 10/27/2020 04:37 PM    .  PR3 ANCA:    Lab Results   Component Value Date/Time    PR3 16 10/27/2020 04:37 PM     URINE SODIUM:    Lab Results   Component Value Date/Time    SLOAN <20 06/08/2022 09:52 PM      URINE CREATININE:    Lab Results   Component Value Date/Time    LABCREA 63.0 06/08/2022 09:52 PM     URINE EOSINOPHILS:   Lab Results   Component Value Date/Time    UREO RARE 10/27/2020 04:35 PM     URINE PROTEIN:    Lab Results   Component Value Date/Time     10/27/2020 04:35 PM     URINALYSIS:  U/A:   Lab Results   Component Value Date/Time    NITRU NEGATIVE 06/27/2022 06:35 PM    COLORU Dark Yellow 06/27/2022 06:35 PM    PHUR 5.5 06/27/2022 06:35 PM    WBCUA 20 TO 50 06/27/2022 06:35 PM    RBCUA 20 TO 50 06/27/2022 06:35 PM    MUCUS 1+ 06/27/2022 06:35 PM    TRICHOMONAS NOT REPORTED 02/07/2022 02:42 AM    YEAST NOT REPORTED 02/07/2022 02:42 AM    BACTERIA FEW 06/12/2022 09:33 PM    SPECGRAV 1.030 06/27/2022 06:35 PM    LEUKOCYTESUR SMALL 06/27/2022 06:35 PM    UROBILINOGEN Normal 06/27/2022 06:35 PM    BILIRUBINUR NEGATIVE 06/27/2022 06:35 PM    GLUCOSEU 1+ 06/27/2022 06:35 PM    KETUA TRACE 06/27/2022 06:35 PM    AMORPHOUS NOT REPORTED 02/07/2022 02:42 AM         ASSESSMENT      1. JOY currently HD dependent, likely secondary to ATN from hypotension requiring pressor support vs progression from underlying diabetic nephropathy, getting dialysis as per [facility] schedule.  Hemodialysis initiated on 6/28/2022. Right IJ tunneled cath placed 7/8/22  2. Hyperkalemia likely from progressive decline in renal function, now improved with dialysis. 3. Nephrotic range proteinuria sec to diabetic nephropathy. 4. Diabetes type 2.  5. Had developed respiratory failure and hypotension earlier during hospitalization, extubated 7/6/22. 6. Nonhealing lower extremity ulcers. 7. Hypotension did require pressor support this admission. 8. Fluid overload    PLAN      1. Hemodialysis today, orders reviewed with nurse. 3 L fluid removal.   2.   working on getting her an outpatient dialysis spot. 3. Follow up labs ordered. 4. Following along       Please do not hesitate to call with questions.     This note is created with the assistance of a speech-recognition program. While intending to generate a document that actually reflects the content of the visit, no guarantees can be provided that every mistake has been identified and corrected by editing    Electronically signed by Pal Hardy MD on 7/11/2022 at 9:17 AM

## 2022-07-11 NOTE — DISCHARGE INSTR - COC
Continuity of Care Form    Patient Name: Carly Haro   :    MRN:  [id number]    /Admit date:  2022  Discharge date:  22    Code Status Order: Full Code   Advance Directives:     Admitting Physician:  No admitting provider for patient encounter. PCP: Mickey Essex, MD    Discharging Nurse: Olga Will RN  [address] Unit/Room#:   Discharging Unit Phone Number: [phone]    Emergency Contact:   Extended Emergency Contact Information  Primary Emergency Contact: Luisito N[address] Phone: [phone]  Relation: Child  Secondary Emergency Contact: CYRIL NELSON  Home Phone: [phone]  Mobile Phone: [phone]  Relation: Child  Preferred language: English    Past Surgical History:  Past Surgical History:   Procedure Laterality Date    COLONOSCOPY N/A 3/15/2022    COLONOSCOPY DIAGNOSTIC performed by Catherine Smith MD at [facility]      x 3, Dr. Jessica Ga  3/7/2022         IR NONTUNNELED VASCULAR CATHETER  2022    IR NONTUNNELED VASCULAR CATHETER 2022 Willian Bullard PA [facility]    IR TUNNELED CATHETER PLACEMENT GREATER THAN 5 YEARS  2022    IR TUNNELED CATHETER PLACEMENT GREATER THAN 5 YEARS 2022 [facility]    THORACENTESIS  3/10/2022         TONSILLECTOMY AND ADENOIDECTOMY      UPPER GASTROINTESTINAL ENDOSCOPY N/A 3/15/2022    EGD BIOPSY performed by Catherine Smith MD at [facility]       Immunization History: There is no immunization history on file for this patient.     Active Problems:  Patient Active Problem List   Diagnosis Code    Vitamin D deficiency E55.9    Venous insufficiency of both lower extremities I87.2    Type 2 diabetes mellitus with stage 4 chronic kidney disease, without long-term current use of insulin (HCC) E11.22, N18.4    Coronary artery disease involving native coronary artery of native heart without angina pectoris I25.10    Iron deficiency anemia D50.9    Stage 4 chronic kidney disease ([facility] N18.4    Colonoscopy refused Z53.20    Pneumococcal vaccination declined Z28.21    Partial deafness of both ears H91.93    Acute on chronic diastolic congestive heart failure (HCC) I50.33    COPD (chronic obstructive pulmonary disease) ([facility] J44.9    CKD stage 4 secondary to hypertension ([facility] I12.9, N18.4    Gout with tophi M1A. 9XX1    Hyperlipidemia with target LDL less than 70 E78.5    Dry skin dermatitis HANDS Y22.0    Toxic metabolic encephalopathy M69.5    Meningioma, cerebral (HCC) D32.0    Paroxysmal atrial fibrillation (HCC) I48.0    Influenza vaccination declined Z28.21    Recurrent UTI N39.0    Kidney stones N20.0    Diverticulosis K57.90    COVID-19 vaccination declined Z28.21    JOY (acute kidney injury) ([facility] N17.9    Chronic venous stasis dermatitis of both lower extremities I87.2    Symptomatic anemia D64.9    Family history of colon cancer Z80.0    Family history of pancreatic cancer Z80.0    Mild malnutrition ([facility]) E44.1    Decreased strength, endurance, and mobility R53.1, Z74.09, R68.89    Bilateral lower extremity edema R60.0    Leg ulcer, left, with fat layer exposed ([address] X02.977    Lymphedema I89.0    Peripheral vascular disease, unspecified ([facility]) I73.9    Ulcer of right leg, with fat layer exposed ([address] L97.912    Acute kidney injury superimposed on CKD ([facility]) N17.9, N18.9    Bradycardia R00.1    Obesity (BMI 30-39. 9) E66.9    TSH elevation R79.89    Chronic ulcer of left leg with fat layer exposed ([address] L97.922    Hyperkalemia E87.5    Shock ([address] R57.9    Acute respiratory failure with hypoxia and hypercapnia ([facility]) J96.01, J96.02    Pulmonary edema cardiac cause ([facility]) I50.1    Dependence on renal dialysis ([address] Z99.2    Nephrotic range proteinuria R80.9    External hemorrhoid K64.4    Dysphagia R13.10    Debility R53.81    Sepsis (HCC) A41.9    Arterial hypotension I95.9       Isolation/Infection:   Isolation            No Isolation          Patient Infection Status       Infection Onset Added Last Indicated Last Indicated By Review Planned Expiration Resolved Resolved By    None active    Resolved    COVID-19 (Rule Out) 06/27/22 06/27/22 06/27/22 COVID-19, Rapid (Ordered)   06/27/22 Rule-Out Test Resulted    COVID-19 (Rule Out) 03/01/22 03/01/22 03/01/22 COVID-19 & Influenza Combo (Ordered)   03/01/22 Rule-Out Test Resulted    COVID-19 (Rule Out) 02/06/22 02/06/22 02/06/22 COVID-19 & Influenza Combo (Ordered)   02/06/22 Rule-Out Test Resulted    COVID-19 (Rule Out) 07/25/20 07/25/20 07/25/20 COVID-19 (Ordered)   07/25/20 Rule-Out Test Resulted            Nurse Assessment:  Last Vital Signs: BP (!) 125/44   Pulse 80   Temp 98.2 °F (36.8 °C) (Axillary)   Resp 24   Ht 4' 11\" (1.499 m)   Wt 133 lb 9.6 oz (60.6 kg)   SpO2 97%   BMI 26.98 kg/m²     Last documented pain score (0-10 scale): Pain Level: 0  Last Weight:   Wt Readings from Last 1 Encounters:   07/16/22 133 lb 9.6 oz (60.6 kg)     Mental Status:  oriented, alert, coherent, logical, and thought processes intact    IV Access:  - Dialysis Catheter  - site  [facility] IV Access [facility]:[id number]}, insertion date: 07/08/22    Nursing Mobility/ADLs:  Walking   Assisted  Transfer  Assisted  Bathing  Dependent  Dressing  Assisted  Toileting  Assisted  Feeding  Independent  Med Admin  Assisted  Med Delivery   whole    Wound Care Documentation and Therapy:  Wound 05/20/22 Right Wound #1 right lower  leg cluster- converged with #2 (Active)   Wound Image   07/12/22 1123   Wound Etiology Venous 07/18/22 0830   Dressing Status Clean;Dry; Intact 07/18/22 0830   Wound Cleansed Not Cleansed 07/18/22 0830   Dressing/Treatment Foam;Coban/self-adherent bandages 07/17/22 2030   Offloading for Diabetic Foot Ulcers Other (comment) 06/15/22 0800   Dressing Change Due 07/19/22 07/14/22 0452   Wound Length (cm) 3.9 cm 07/12/22 1123   Wound Width (cm) 0.5 cm 07/12/22 1123   Wound Depth (cm) 0.1 cm 07/12/22 1123   Wound Surface Area (cm^2) 1.95 cm^2 07/12/22 1123   Change in Wound Size % (l*w) 87 07/12/22 1123   Wound Volume (cm^3) 0.195 cm^3 07/12/22 1123   Wound Healing % 87 07/12/22 1123   Post-Procedure Length (cm) 14 cm 06/22/22 1011   Post-Procedure Width (cm) 36.5 cm 06/22/22 1011   Post-Procedure Depth (cm) 0.1 cm 06/22/22 1011   Post-Procedure Surface Area (cm^2) 511 cm^2 06/22/22 1011   Post-Procedure Volume (cm^3) 51.1 cm^3 06/22/22 1011   Wound Assessment Other (Comment) 07/18/22 0830   Drainage Amount None 07/18/22 0830   Drainage Description Serosanguinous 07/18/22 0830   Odor None 07/18/22 0830   Marian-wound Assessment Intact 07/18/22 0830   Margins Other (Comment) 07/18/22 0830   Wound Thickness Description not for Pressure Injury Full thickness 07/18/22 0830   Number of days: 59       Wound 05/20/22 Left;Proximal Wound # 4 Left lower lat leg (Active)   Wound Image   07/12/22 1123   Wound Etiology Venous 07/18/22 0830   Dressing Status Clean;Dry; Intact 07/18/22 0830   Wound Cleansed Not Cleansed 07/18/22 0830   Dressing/Treatment Foam;Coban/self-adherent bandages 07/18/22 0830   Dressing Change Due 07/19/22 07/14/22 0452   Wound Length (cm) 0.7 cm 07/12/22 1123   Wound Width (cm) 0.3 cm 07/12/22 1123   Wound Depth (cm) 0.1 cm 07/12/22 1123   Wound Surface Area (cm^2) 0.21 cm^2 07/12/22 1123   Change in Wound Size % (l*w) 98.58 07/12/22 1123   Wound Volume (cm^3) 0.021 cm^3 07/12/22 1123   Wound Healing % 99 07/12/22 1123   Post-Procedure Length (cm) 11.5 cm 06/22/22 1011   Post-Procedure Width (cm) 9 cm 06/22/22 1011   Post-Procedure Depth (cm) 0.1 cm 06/22/22 1011   Post-Procedure Surface Area (cm^2) 103.5 cm^2 06/22/22 1011   Post-Procedure Volume (cm^3) 10.35 cm^3 06/22/22 1011   Wound Assessment Other (Comment) 07/18/22 0830   Drainage Amount None 07/18/22 0830   Drainage Description Serosanguinous 07/18/22 0830   Odor None 07/18/22 0830   Marian-wound Assessment Intact 07/18/22 0830   Margins Other (Comment) 07/18/22 0830   Wound Thickness Description not for Pressure Injury Full thickness 07/18/22 0830   Number of days: 59       Wound 06/27/22 Buttocks Right;Mid small open pale wound (Active)   Wound Image   07/12/22 1123   Wound Etiology Pressure Stage 3 07/18/22 0830   Dressing Status Clean;Dry; Intact 07/18/22 0830   Wound Cleansed Not Cleansed 07/18/22 0830   Dressing/Treatment Foam 07/18/22 0830   Dressing Change Due 07/20/22 07/18/22 0830   Wound Length (cm) 3.5 cm 07/12/22 1123   Wound Width (cm) 3.5 cm 07/12/22 1123   Wound Depth (cm) 0.2 cm 07/12/22 1123   Wound Surface Area (cm^2) 12.25 cm^2 07/12/22 1123   Change in Wound Size % (l*w) -104.17 07/12/22 1123   Wound Volume (cm^3) 2.45 cm^3 07/12/22 1123   Wound Healing % -172 07/12/22 1123   Wound Assessment Pink/red 07/18/22 0830   Drainage Amount None 07/18/22 0830   Drainage Description Serosanguinous 07/18/22 0830   Odor None 07/18/22 0830   Marian-wound Assessment Intact; Blanchable erythema 07/18/22 0830   Margins Defined edges 07/18/22 0830   Number of days: 20       Wound 07/05/22 Foot Left;Lateral (Active)   Wound Image   07/12/22 1123   Wound Etiology Pressure Unstageable 07/18/22 0830   Dressing Status Clean;Dry; Intact 07/18/22 0830   Wound Cleansed Not Cleansed 07/18/22 0830   Dressing/Treatment Foam;Coban/self-adherent bandages 07/17/22 2030   Dressing Change Due 07/19/22 07/14/22 0452   Wound Length (cm) 0.3 cm 07/12/22 1123   Wound Width (cm) 0.6 cm 07/12/22 1123   Wound Depth (cm) 0.1 cm 07/12/22 1123   Wound Surface Area (cm^2) 0.18 cm^2 07/12/22 1123   Change in Wound Size % (l*w) 0 07/12/22 1123   Wound Volume (cm^3) 0.018 cm^3 07/12/22 1123   Wound Assessment Other (Comment) 07/18/22 0830   Drainage Amount None 07/18/22 0830   Drainage Description Serosanguinous 07/18/22 0830   Odor None 07/18/22 0830   Marian-wound Assessment Intact 07/18/22 0830   Number of days: 12     Requires weekly Unna boots bilaterally due to  rapid recurrence of venous stasis wounds when standard compression garments were trailed. Patient also developed a sacral pressure injury prior to admission. 7/12/2022: sacrococcygeal wound evolved to full thickness with necrotic tissue obscuring depth/base thus unstageable. The BLE venous ulcers are all but healed and the edema is well controlled with weekly calamine zinc unnas                                           Elimination:  Continence: Bowel: no  Bladder: No  Urinary Catheter: None   Colostomy/Ileostomy/Ileal Conduit: No       Date of Last BM: 07/12/22    Intake/Output Summary (Last 24 hours) at 7/18/2022 1215  Last data filed at 7/18/2022 0930  Gross per 24 hour   Intake 380 ml   Output 400 ml   Net -20 ml     I/O last 3 completed shifts: In: 450 [P.O.:120; Blood:330]  Out: 325 [Urine:325]    Safety Concerns: At Risk for Falls    Impairments/Disabilities:      Vision and Hearing    Nutrition Therapy:  Current Nutrition Therapy:   - Oral Diet:  Carb Control 4 carbs/meal (1800kcals/day)    Routes of Feeding: Oral  Liquids: No Restrictions  Daily Fluid Restriction: yes - amount ***1800  Last Modified Barium Swallow with Video (Video Swallowing Test): not done    Treatments at the Time of Hospital Discharge:   Respiratory Treatments: N/A  Oxygen Therapy:  is on oxygen at 1-2 L/min per nasal cannula.   Ventilator:    - No ventilator support    Rehab Therapies: Physical Therapy and Occupational Therapy  Weight Bearing Status/Restrictions: No weight bearing restrictions  Other Medical Equipment (for information only, NOT a DME order):  {EQUIPMENT:[id number]}  Other Treatments: N/A    Patient's personal belongings (please select all that are sent with patient):  None    RN SIGNATURE:  Electronically signed by Sophy Suarez RN on 7/12/22 at 4:43 PM EDT    CASE MANAGEMENT/SOCIAL WORK SECTION    Inpatient Status Date: ***    Readmission Risk Assessment Score:  Readmission Risk              Risk of Unplanned Readmission:  [id number]

## 2022-07-11 NOTE — PROGRESS NOTES
Dialysis Post Treatment Note  Vitals:    07/11/22 1333   BP: (!) 161/58   Pulse: 68   Resp: 17   Temp: 98.8 °F (37.1 °C)   SpO2:      Pre-Weight = 65.6kg  Post-weight = Weight: 138 lb 14.2 oz (63 kg)  Total Liters Processed = Blood Volume Processed (Liters): 79.2 l/min  Rinseback Volume (mL) = Rinseback Volume (ml): 300 ml  Net Removal (mL) =   2500  Patient's dry weight=n/a  Type of access used=perm catheter  Length of treatment=210    Patient tolerated treatment well with no complaints

## 2022-07-11 NOTE — PLAN OF CARE
Problem: Respiratory - Adult  Goal: Achieves optimal ventilation and oxygenation  Outcome: Progressing   BRONCHOSPASM/BRONCHOCONSTRICTION     [x]         IMPROVE AERATION/BREATH SOUNDS  [x]   ADMINISTER BRONCHODILATOR THERAPY AS APPROPRIATE  [x]   ASSESS BREATH SOUNDS  []   IMPLEMENT AEROSOL/MDI PROTOCOL  [x]   PATIENT EDUCATION AS NEEDED

## 2022-07-11 NOTE — PROGRESS NOTES
Dialysis Time Out  To be done by RN and tech or 2 RNs  Staff Names: Yany Morales RN    [x]  Identity of the patient using 2 patient identifiers  [x]  Consent for treatment  [x]  Equipment-proper machine and dialyzer  [x]  B-Hep B status  [x]  Orders- to include bath, blood flow, dialyzer, time and fluid removal  [x]  Access-Correct site and in working order  [x]  Time for patient to ask questions.

## 2022-07-12 PROBLEM — N17.9 AKI (ACUTE KIDNEY INJURY) (HCC): Status: RESOLVED | Noted: 2022-01-15 | Resolved: 2022-07-12

## 2022-07-12 PROBLEM — R53.81 DEBILITY: Status: ACTIVE | Noted: 2022-07-12

## 2022-07-12 PROBLEM — N17.9 ACUTE KIDNEY INJURY SUPERIMPOSED ON CKD (HCC): Status: RESOLVED | Noted: 2022-06-08 | Resolved: 2022-07-12

## 2022-07-12 PROBLEM — N18.9 ACUTE KIDNEY INJURY SUPERIMPOSED ON CKD (HCC): Status: RESOLVED | Noted: 2022-06-08 | Resolved: 2022-07-12

## 2022-07-12 PROBLEM — R00.1 BRADYCARDIA: Status: RESOLVED | Noted: 2022-06-27 | Resolved: 2022-07-12

## 2022-07-12 PROBLEM — A41.9 SEPSIS (HCC): Status: ACTIVE | Noted: 2022-07-12

## 2022-07-12 PROBLEM — E87.5 HYPERKALEMIA: Status: RESOLVED | Noted: 2022-06-28 | Resolved: 2022-07-12

## 2022-07-12 LAB
ABSOLUTE EOS #: 0.08 K/UL (ref 0–0.44)
ABSOLUTE IMMATURE GRANULOCYTE: 0.21 K/UL (ref 0–0.3)
ABSOLUTE LYMPH #: 1.01 K/UL (ref 1.1–3.7)
ABSOLUTE MONO #: 1.1 K/UL (ref 0.1–1.2)
ANION GAP SERPL CALCULATED.3IONS-SCNC: 8 MMOL/L (ref 9–17)
BASOPHILS # BLD: 1 % (ref 0–2)
BASOPHILS ABSOLUTE: 0.05 K/UL (ref 0–0.2)
BUN BLDV-MCNC: 15 MG/DL (ref 8–23)
CALCIUM SERPL-MCNC: 8.2 MG/DL (ref 8.6–10.4)
CHLORIDE BLD-SCNC: 103 MMOL/L (ref 98–107)
CO2: 26 MMOL/L (ref 20–31)
CREAT SERPL-MCNC: 2 MG/DL (ref 0.5–0.9)
EOSINOPHILS RELATIVE PERCENT: 1 % (ref 1–4)
GFR AFRICAN AMERICAN: 30 ML/MIN
GFR NON-AFRICAN AMERICAN: 24 ML/MIN
GFR SERPL CREATININE-BSD FRML MDRD: ABNORMAL ML/MIN/{1.73_M2}
GLUCOSE BLD-MCNC: 135 MG/DL (ref 65–105)
GLUCOSE BLD-MCNC: 75 MG/DL (ref 65–105)
GLUCOSE BLD-MCNC: 77 MG/DL (ref 65–105)
GLUCOSE BLD-MCNC: 90 MG/DL (ref 65–105)
GLUCOSE BLD-MCNC: 98 MG/DL (ref 70–99)
HCT VFR BLD CALC: 27.7 % (ref 36.3–47.1)
HEMOGLOBIN: 8.9 G/DL (ref 11.9–15.1)
IMMATURE GRANULOCYTES: 2 %
LYMPHOCYTES # BLD: 10 % (ref 24–43)
MCH RBC QN AUTO: 30.8 PG (ref 25.2–33.5)
MCHC RBC AUTO-ENTMCNC: 32.1 G/DL (ref 28.4–34.8)
MCV RBC AUTO: 95.8 FL (ref 82.6–102.9)
MONOCYTES # BLD: 11 % (ref 3–12)
NRBC AUTOMATED: 0 PER 100 WBC
PDW BLD-RTO: 16.4 % (ref 11.8–14.4)
PLATELET # BLD: 250 K/UL (ref 138–453)
PMV BLD AUTO: 9 FL (ref 8.1–13.5)
POTASSIUM SERPL-SCNC: 3.6 MMOL/L (ref 3.7–5.3)
RBC # BLD: 2.89 M/UL (ref 3.95–5.11)
RBC # BLD: ABNORMAL 10*6/UL
SEG NEUTROPHILS: 75 % (ref 36–65)
SEGMENTED NEUTROPHILS ABSOLUTE COUNT: 7.34 K/UL (ref 1.5–8.1)
SODIUM BLD-SCNC: 137 MMOL/L (ref 135–144)
WBC # BLD: 9.8 K/UL (ref 3.5–11.3)

## 2022-07-12 PROCEDURE — 6370000000 HC RX 637 (ALT 250 FOR IP): Performed by: INTERNAL MEDICINE

## 2022-07-12 PROCEDURE — 6370000000 HC RX 637 (ALT 250 FOR IP): Performed by: STUDENT IN AN ORGANIZED HEALTH CARE EDUCATION/TRAINING PROGRAM

## 2022-07-12 PROCEDURE — 99232 SBSQ HOSP IP/OBS MODERATE 35: CPT | Performed by: INTERNAL MEDICINE

## 2022-07-12 PROCEDURE — 2500000003 HC RX 250 WO HCPCS: Performed by: STUDENT IN AN ORGANIZED HEALTH CARE EDUCATION/TRAINING PROGRAM

## 2022-07-12 PROCEDURE — 2060000000 HC ICU INTERMEDIATE R&B

## 2022-07-12 PROCEDURE — 36415 COLL VENOUS BLD VENIPUNCTURE: CPT

## 2022-07-12 PROCEDURE — 94640 AIRWAY INHALATION TREATMENT: CPT

## 2022-07-12 PROCEDURE — 82947 ASSAY GLUCOSE BLOOD QUANT: CPT

## 2022-07-12 PROCEDURE — 94761 N-INVAS EAR/PLS OXIMETRY MLT: CPT

## 2022-07-12 PROCEDURE — 80048 BASIC METABOLIC PNL TOTAL CA: CPT

## 2022-07-12 PROCEDURE — 2700000000 HC OXYGEN THERAPY PER DAY

## 2022-07-12 PROCEDURE — 6370000000 HC RX 637 (ALT 250 FOR IP): Performed by: FAMILY MEDICINE

## 2022-07-12 PROCEDURE — 97166 OT EVAL MOD COMPLEX 45 MIN: CPT

## 2022-07-12 PROCEDURE — 6360000002 HC RX W HCPCS: Performed by: INTERNAL MEDICINE

## 2022-07-12 PROCEDURE — 85025 COMPLETE CBC W/AUTO DIFF WBC: CPT

## 2022-07-12 PROCEDURE — 97535 SELF CARE MNGMENT TRAINING: CPT

## 2022-07-12 PROCEDURE — 99214 OFFICE O/P EST MOD 30 MIN: CPT

## 2022-07-12 PROCEDURE — 2580000003 HC RX 258: Performed by: STUDENT IN AN ORGANIZED HEALTH CARE EDUCATION/TRAINING PROGRAM

## 2022-07-12 RX ORDER — HALOPERIDOL 5 MG/ML
1 INJECTION INTRAMUSCULAR EVERY 6 HOURS PRN
Status: DISCONTINUED | OUTPATIENT
Start: 2022-07-12 | End: 2022-07-18 | Stop reason: HOSPADM

## 2022-07-12 RX ORDER — GUAIFENESIN 600 MG/1
600 TABLET, EXTENDED RELEASE ORAL 2 TIMES DAILY
Status: DISCONTINUED | OUTPATIENT
Start: 2022-07-12 | End: 2022-07-18 | Stop reason: HOSPADM

## 2022-07-12 RX ORDER — QUETIAPINE FUMARATE 25 MG/1
12.5 TABLET, FILM COATED ORAL NIGHTLY
Status: DISCONTINUED | OUTPATIENT
Start: 2022-07-12 | End: 2022-07-14

## 2022-07-12 RX ADMIN — Medication 81 MG: at 09:03

## 2022-07-12 RX ADMIN — ANTI-FUNGAL POWDER MICONAZOLE NITRATE TALC FREE: 1.42 POWDER TOPICAL at 20:05

## 2022-07-12 RX ADMIN — LEVOTHYROXINE SODIUM 25 MCG: 25 TABLET ORAL at 09:06

## 2022-07-12 RX ADMIN — ALBUTEROL SULFATE 2.5 MG: 2.5 SOLUTION RESPIRATORY (INHALATION) at 21:21

## 2022-07-12 RX ADMIN — HYDROCORTISONE ACETATE 25 MG: 25 SUPPOSITORY RECTAL at 09:06

## 2022-07-12 RX ADMIN — ALBUTEROL SULFATE 2.5 MG: 2.5 SOLUTION RESPIRATORY (INHALATION) at 15:30

## 2022-07-12 RX ADMIN — ALBUTEROL SULFATE 2.5 MG: 2.5 SOLUTION RESPIRATORY (INHALATION) at 08:32

## 2022-07-12 RX ADMIN — METOPROLOL TARTRATE 25 MG: 25 TABLET ORAL at 09:04

## 2022-07-12 RX ADMIN — APIXABAN 5 MG: 5 TABLET, FILM COATED ORAL at 20:05

## 2022-07-12 RX ADMIN — GUAIFENESIN DEXTROMETHORPHAN HYDROBROMIDE ORAL SOLUTION 10 ML: 200; 20 SOLUTION ORAL at 09:03

## 2022-07-12 RX ADMIN — AMIODARONE HYDROCHLORIDE 100 MG: 200 TABLET ORAL at 09:04

## 2022-07-12 RX ADMIN — ACETAMINOPHEN 325MG 650 MG: 325 TABLET ORAL at 09:23

## 2022-07-12 RX ADMIN — MAGNESIUM GLUCONATE 500 MG ORAL TABLET 400 MG: 500 TABLET ORAL at 20:05

## 2022-07-12 RX ADMIN — ATORVASTATIN CALCIUM 40 MG: 80 TABLET, FILM COATED ORAL at 09:04

## 2022-07-12 RX ADMIN — MAGNESIUM GLUCONATE 500 MG ORAL TABLET 400 MG: 500 TABLET ORAL at 09:04

## 2022-07-12 RX ADMIN — DOCUSATE SODIUM LIQUID 100 MG: 100 LIQUID ORAL at 09:05

## 2022-07-12 RX ADMIN — METOPROLOL TARTRATE 25 MG: 25 TABLET ORAL at 20:04

## 2022-07-12 RX ADMIN — ALBUTEROL SULFATE 2.5 MG: 2.5 SOLUTION RESPIRATORY (INHALATION) at 01:48

## 2022-07-12 RX ADMIN — POLYETHYLENE GLYCOL 3350 17 G: 17 POWDER, FOR SOLUTION ORAL at 09:15

## 2022-07-12 RX ADMIN — GUAIFENESIN 600 MG: 600 TABLET, EXTENDED RELEASE ORAL at 20:05

## 2022-07-12 RX ADMIN — GUAIFENESIN 600 MG: 600 TABLET, EXTENDED RELEASE ORAL at 14:22

## 2022-07-12 RX ADMIN — PANTOPRAZOLE SODIUM 40 MG: 40 TABLET, DELAYED RELEASE ORAL at 09:03

## 2022-07-12 RX ADMIN — Medication 1 EACH: at 11:31

## 2022-07-12 RX ADMIN — SODIUM CHLORIDE, PRESERVATIVE FREE 10 ML: 5 INJECTION INTRAVENOUS at 09:07

## 2022-07-12 RX ADMIN — ANTI-FUNGAL POWDER MICONAZOLE NITRATE TALC FREE: 1.42 POWDER TOPICAL at 09:07

## 2022-07-12 RX ADMIN — FERROUS SULFATE TAB EC 325 MG (65 MG FE EQUIVALENT) 325 MG: 325 (65 FE) TABLET DELAYED RESPONSE at 14:21

## 2022-07-12 RX ADMIN — APIXABAN 5 MG: 5 TABLET, FILM COATED ORAL at 09:03

## 2022-07-12 ASSESSMENT — ENCOUNTER SYMPTOMS
TROUBLE SWALLOWING: 0
WHEEZING: 0
DIARRHEA: 0
VOICE CHANGE: 0
NAUSEA: 0
VOMITING: 0
ABDOMINAL DISTENTION: 0
CHEST TIGHTNESS: 0
ABDOMINAL PAIN: 0
BLOOD IN STOOL: 0
TROUBLE SWALLOWING: 1
COUGH: 1
SHORTNESS OF BREATH: 0
SORE THROAT: 0
EYE REDNESS: 0
CONSTIPATION: 0
PHOTOPHOBIA: 0

## 2022-07-12 NOTE — PROGRESS NOTES
[facility]  Office: [address], DO, Author Dayana DO, Chelle Maciel DO, María Elena Cummings DO, Jovanna Laureano MD, Domingo Sanderson MD, Jlil Serrato MD, Carolan Schilder, MD, Linda Knight MD, China Portillo MD, Chase Liao MD, Hanny Cosme, DO, Eduard Rey MD,  Anjana Romero DO, Bipin Chambers MD, Deanna Zuñiga MD, Rajinder Pyle DO, Mahesh Miller MD, Hakeem Juarez MD, Donnell Mabry DO, Ish Mcgovern MD, Janett Garcia MD, Nas Abad [facility], [facility], CNP, Shamir Snell, CNP, [facility], CNP, Dm Zeng, CNP, Robert Colmenares, CNP, TABATHA PiersonC, Eliazar Barba, [facility], Chemo Rojo, CNP, Terrence Clemons, CNP, Jing Shahid, CNP, Joe Gonzalez, CNS, Lysbeth Angelucci, [facility], Mireya So, CNP, Vanessa Wallace, [facility], Caryle Daniele, [address] [place] [address]    Progress Note    7/12/2022    11:16 AM    Name:   Viky Copeland  MRN:     [id number]     Acct:      [de-identified]   Room:   2019/2019-01   Day:  13  Admit Date:  6/27/2022 12:28 PM    PCP:   Whit Michelle MD  Code Status:  Full Code    Subjective:     C/C:   Chief Complaint   Patient presents with    Bradycardia     Interval History Status:  improving    Patient complaining of copious secretions and headache. Given Tylenol by nursing. Patient has found placement for hemodialysis, plan to discharge when placement is found. She did not sleep last night, discussed giving Seroquel for hospital delirium    Brief History:     78-year-old with prior history of CKD stage IV( baseline creatinine 1.8-2), chronic diastolic CHF, hypertension, CAD s/p CABG in 2003 and occluded SVG to OM 2 and cath in 2006, COPD, type 2 diabetes, A. fib on amiodarone and Eliquis  Presented to ED on 6/27/2021 with complaints of shortness of breath hypoxia improved on 90s, and fatigue.   Was found to be bradycardic heart rate in 35 by EMS and was placed on transcutaneous pacing and received atropine. She was subsequently started on epinephrine drip and admitted to the ICU. X-ray consistent with pulmonary edema. Cardiology was consulted and switched to dopamine drip, and Eliquis was switched to heparin WBP. Was also noted to be hyperkalemic and started on hemodialysis sec to worsening CKD on 06/28/22. Neurology was consulted for encephalopathy : noted to have right anterior temporal fossa meningioma with possible edema. MRI considered nonemergent and deferred.,   ID was consulted for leukocytosis and b/l LE wounds which are chronic(sees wound care clinic outpatient) and Right gluteal /coccygeal ulcerations. Started on Cefepime.   -On 6/28/2022 she had progressively worsening mentation with worsening hypoxia and hypercapnia she was intubated and started on HD. Heparin was held on 6/29-07/01 secondary to oozing at right IJ dialysis catheter site. Patient was continued on empiric antibiotics through 7/1/22(urine and blood cx NG), dialysis/UF and subsequently extubated on 07/06/22. Received 1u PRBC 7/7/22 for hb 7.0  Today she is on 2l NC O2, K 3.9, bicarb 27, Hb 8.4, lethargic, arousable but falling asleep easily, appears tired. Review of Systems:     Review of Systems   Constitutional: Positive for activity change and appetite change. Negative for chills, diaphoresis and fever. HENT: Positive for trouble swallowing. Negative for congestion. Eyes: Negative for visual disturbance. Respiratory: Positive for cough. Negative for chest tightness, shortness of breath and wheezing. Cardiovascular: Negative for chest pain, palpitations and leg swelling. Gastrointestinal: Negative for abdominal pain, blood in stool, constipation, diarrhea, nausea and vomiting. Genitourinary: Negative for difficulty urinating. Neurological: Negative for dizziness, weakness, light-headedness, numbness and headaches.    Psychiatric/Behavioral: Positive for confusion (resolving). All other systems reviewed and are negative. Medications: Allergies:     Allergies   Allergen Reactions    Latex Rash    Aleve [Naproxen Sodium]      Chronic kidney disease stage III, CHF    Pioglitazone Other (See Comments)     Congestive heart failure    Claritin [Loratadine]     Keflex [Cephalexin]     Lisinopril      Needs clarification of contraindication       Current Meds:   Scheduled Meds:    QUEtiapine  12.5 mg Oral Nightly    guaiFENesin  600 mg Oral BID    gelocast unnas boot  1 each Other Once    polyethylene glycol  17 g Oral BID    hydrocortisone  25 mg Rectal BID    pantoprazole  40 mg Oral QAM AC    apixaban  5 mg Oral BID    metoprolol tartrate  25 mg Oral BID    epoetin rick-epbx  5,000 Units IntraVENous Q MWF    levothyroxine  25 mcg Oral Daily    amiodarone  100 mg Oral Daily    albuterol  2.5 mg Nebulization Q6H    aspirin EC  81 mg Oral Daily    atorvastatin  40 mg Oral Daily    magnesium oxide  400 mg Oral BID    miconazole   Topical BID    ferrous sulfate  325 mg Oral Daily with breakfast     Continuous Infusions:    sodium chloride      sodium chloride      sodium chloride      dextrose      sodium chloride 75 mL/hr at 07/06/22 2000     PRN Meds: haloperidol lactate, heparin (porcine), heparin (porcine), lidocaine, nitroglycerin, bisacodyl, sodium chloride, albumin human, sodium chloride flush, sodium chloride, sodium chloride, fentanNYL, glucose, dextrose bolus **OR** dextrose bolus, glucagon (rDNA), dextrose, sodium chloride flush, sodium chloride, ondansetron **OR** ondansetron, acetaminophen **OR** acetaminophen, morphine    Data:     Past Medical History:   has a past medical history of Acute CVA (cerebrovascular accident) ([facility] [address] Acute kidney injury superimposed on chronic kidney disease ([facility] [address] Acute on chronic combined systolic (congestive) and diastolic (congestive) heart failure ([address] JOY (acute kidney injury) ([address] Altered mental status, Anticoagulated, Arthritis, Asthma, Bradycardia, Brain mass, Cellulitis and abscess, Cellulitis and abscess of unspecified site, Cellulitis of both lower extremities, Cellulitis of left lower extremity, Cellulitis of lower extremity, CHF (congestive heart failure) ([address] Chronic kidney disease, unspecified, CKD stage 4 secondary to hypertension ([address] Coronary atherosclerosis of native coronary artery, Elevated LFTs, Essential hypertension, Essential hypertension, malignant, Hallucinations, Hard of hearing, Head contusion, Hypertensive emergency, Hypertensive urgency with nyla, Hypokalemia, Hypomagnesemia, Iron deficiency anemia, unspecified, Meningioma ([address] Myocardial infarction ([address] Other and unspecified hyperlipidemia, Pure hypercholesterolemia, Stage 4 chronic kidney disease ([address] Toxic metabolic encephalopathy, Type 2 diabetes mellitus with stage 4 chronic kidney disease, without long-term current use of insulin ([address] Type II or unspecified type diabetes mellitus without mention of complication, not stated as uncontrolled, Unspecified asthma(493.90), Unspecified venous (peripheral) insufficiency, Unspecified vitamin D deficiency, and UTI (urinary tract infection). Social History:   reports that she quit smoking about 22 years ago. She has a 2.50 pack-year smoking history. She has never used smokeless tobacco. She reports previous alcohol use. She reports that she does not use drugs.      Family History:   Family History   Problem Relation Age of Onset    Diabetes Mother     Heart Disease Mother     Heart Disease Father     Diabetes Sister     High Blood Pressure Sister     Diabetes Maternal Grandmother        Vitals:  BP (!) 133/50   Pulse 71   Temp 98.9 °F (37.2 °C) (Oral)   Resp 18   Ht 4' 11\" (1.499 m)   Wt 138 lb 14.2 oz (63 kg)   SpO2 91%   BMI 28.05 kg/m²   Temp (24hrs), Av.4 °F (36.9 °C), Min:98.1 °F (36.7 °C), Max:98.9 °F (37.2 °C)    Recent Labs     22  2560 07/11/22  1903 07/12/22  0754 07/12/22  1050   POCGLU 143* 122* 90 77       I/O (24Hr): Intake/Output Summary (Last 24 hours) at 7/12/2022 1116  Last data filed at 7/11/2022 1333  Gross per 24 hour   Intake 300 ml   Output 2800 ml   Net -2500 ml       Labs:  Hematology:  Recent Labs     07/10/22  0247 07/11/22  0626 07/12/22  0545   WBC 8.9 8.9 9.8   RBC 2.82* 2.79* 2.89*   HGB 8.4* 8.5* 8.9*   HCT 28.1* 27.6* 27.7*   MCV 99.6 98.9 95.8   MCH 29.8 30.5 30.8   MCHC 29.9 30.8 32.1   RDW 16.8* 16.7* 16.4*    312 250   MPV 9.2 9.5 9.0     Chemistry:  Recent Labs     07/10/22  0247 07/11/22  0626 07/12/22  0545    137 137   K 3.7 3.9 3.6*   CL 99 99 103   CO2 24 25 26   GLUCOSE 89 91 98   BUN 24* 31* 15   CREATININE 2.14* 2.81* 2.00*   ANIONGAP 12 13 8*   LABGLOM 23* 17* 24*   GFRAA 27* 20* 30*   CALCIUM 8.4* 8.6 8.2*     Recent Labs     07/10/22  1137 07/10/22  1547 07/11/22  1509 07/11/22  1903 07/12/22  0754 07/12/22  1050   POCGLU 107* 115* 143* 122* 90 77     ABG:  Lab Results   Component Value Date/Time    POCPH 7.411 07/06/2022 04:40 AM    PHART 7.459 03/09/2022 04:28 AM    POCPCO2 44.8 07/06/2022 04:40 AM    UYA7JGZ 39.9 03/09/2022 04:28 AM    POCPO2 92.6 07/06/2022 04:40 AM    PO2ART 107.0 03/09/2022 04:28 AM    POCHCO3 28.5 07/06/2022 04:40 AM    ZLD2YWD 28.3 03/09/2022 04:28 AM    NBEA 5 06/28/2022 05:36 PM    PBEA 4 07/06/2022 04:40 AM    AKHA9QAO 97 07/06/2022 04:40 AM    T9ZKNRWX 97.4 03/09/2022 04:28 AM    FIO2 30.0 07/06/2022 04:40 AM     Lab Results   Component Value Date/Time    SPECIAL NOT GIVEN 06/27/2022 01:11 PM     Lab Results   Component Value Date/Time    CULTURE NO GROWTH 3 DAYS 07/09/2022 05:12 AM    CULTURE NO GROWTH 3 DAYS 07/09/2022 05:12 AM       Radiology:  XR CHEST PORTABLE    Result Date: 7/3/2022  Mild increased perihilar edema, mild increased perihilar opacification, likely edema and CHF. Persistent small effusions and bibasilar atelectasis.  Satisfactory position of endotracheal tube. IR TUNNELED CVC PLACE WO SQ PORT/PUMP > 5 YEARS    Result Date: 7/8/2022  Successful ultrasound and fluoroscopy guided tunneled catheter placement . Okay to use. Physical Examination:        Physical Exam  Vitals and nursing note reviewed. Constitutional:       General: She is not in acute distress. Interventions: Nasal cannula in place. HENT:      Head: Normocephalic and atraumatic. Eyes:      Conjunctiva/sclera: Conjunctivae normal.      Pupils: Pupils are equal, round, and reactive to light. Cardiovascular:      Rate and Rhythm: Normal rate and regular rhythm. Heart sounds: No murmur heard. Pulmonary:      Effort: Pulmonary effort is normal. No accessory muscle usage or respiratory distress. Breath sounds: Transmitted upper airway sounds present. No stridor. Rhonchi and rales present. No decreased breath sounds or wheezing. Chest:      Comments: Tunneled catheter noted   Abdominal:      General: Bowel sounds are normal. There is no distension. Palpations: Abdomen is soft. Abdomen is not rigid. Tenderness: There is no abdominal tenderness. There is no guarding. Musculoskeletal:         General: No tenderness. Skin:     General: Skin is warm and dry. Findings: No erythema, lesion or rash. Neurological:      Mental Status: She is alert. She is disoriented. Cranial Nerves: No cranial nerve deficit. Motor: No seizure activity. Psychiatric:         Speech: Speech normal.         Behavior: Behavior normal. Behavior is cooperative.          Assessment:        Hospital Problems           Last Modified POA    * (Principal) Toxic metabolic encephalopathy 7/37/4815 Yes    Lymphedema 6/28/2022 Yes    Acute respiratory failure with hypoxia and hypercapnia ([address] 6/28/2022 Yes    Pulmonary edema cardiac cause ([address] 6/28/2022 Yes    Dependence on renal dialysis ([address] 7/5/2022 Yes    Nephrotic range proteinuria 7/5/2022 Yes    External hemorrhoid 7/10/2022 Yes    Dysphagia 7/10/2022 Yes    Debility 7/12/2022 Yes    Sepsis ([facility] [address] 7/12/2022 Yes    Acute on chronic diastolic congestive heart failure ([facility] [address] 6/28/2022 Yes                Plan:        1. Sinus bradycardia-from amiodarone and Lopressor, restarted by cardiology, they have since signed off. 2. Hospital delirium-patient not sleeping at night, confused this morning, start 12.5 mg Seroquel at night  3. Acute respiratory failure with hypoxia- wean oxygen as tolerated, start Mucinex for copious secretions  4. End-stage renal disease on hemodialysis-set up outpatient already, attempting to find facility they will take dialysis, getting Epogen and iron, nephrology is following  5. Hypothyroidism-continue Synthroid 25 mcg  6. Coronary artery disease with CABG- last heart cath 2006  7. Paroxysmal atrial fibrillation-continue Eliquis, rate controlled with amiodarone and Lopressor  8. Plan:  1. DC when placed  2. Home o2 eval when ready to go   3. Start seroquel tonight.      Norberto Sampson DO  7/12/2022  11:16 AM

## 2022-07-12 NOTE — CARE COORDINATION
Codey with [facility] called, they can take pt under Medicaid Pending. Report [phone]  Fax [phone]    Keyonna's Cell phone is: [phone]  Needs HENS, no COVID test.     [facility] with [facility]  Confirmed that they can transport pt tonight between 8:30-9pm    1620 informed Codey with [facility] in [place] that pt can be transported today between 8:30pm - 9:00pm Informed her that pt has HD on [facility] AT [facility]. She will verify that they can transport pt with Medicaid Pending    [facility] with [facility] called back, they will not provide transportation until pt has Medicaid. She asked if family member would commit to transporting pt 3 days a week until Medicaid is approved. Pt's daughter Leonora Rodriguez stated she would, however she will need help getting pt in and out of car. Codey stated they would help her at [facility] getting pt in and out of car, however [facility]. 1640 Attempted to call Nancy Delgado to ask if they will help pt's in and out of pt's daughters call when she arrives at and leaves Dialysis facility, no answer X 3, did not leave message. 1645 informed Codey and pt's daughter Leonora Rodriguez. [facility] and [facility] arranged. Informed Karyn with [address] that pt will not be transported today.

## 2022-07-12 NOTE — PROGRESS NOTES
Pt confused during night hours: often trying trying to get out of bed. Pt pulled off telemetry leads twice, and attempted to pull out hemodialysis port at the right clavicle. She did remove the dressing and cause some bleeding. I replaced the microbial dressing at [id number]. She is producing some urine and needed to be briefed overnight. Pt was changed 3 times for urine and I replaced the mepi on her sacrum. Pt got a new gown and wiped down completely w/ bath wipes.

## 2022-07-12 NOTE — PROGRESS NOTES
Occupational Therapy  Facility/Department: [facility] CAR 2  Occupational Therapy Initial Assessment    Name: Reji Shearer  : 1950  MRN: [id number]  Date of Service: 2022    Chief Complaint   Patient presents with    Bradycardia       Discharge Recommendations:   Pt would benefit from continued therapy at discharge; pt requires 24 hr assist  OT Equipment Recommendations  Equipment Needed:  (Continue to assess pending progress)       Patient Diagnosis(es): The encounter diagnosis was Bradycardia.   Past Medical History:  has a past medical history of Acute CVA (cerebrovascular accident) ([address] Acute kidney injury superimposed on chronic kidney disease ([address] Acute on chronic combined systolic (congestive) and diastolic (congestive) heart failure ([address] JOY (acute kidney injury) ([address] Altered mental status, Anticoagulated, Arthritis, Asthma, Bradycardia, Brain mass, Cellulitis and abscess, Cellulitis and abscess of unspecified site, Cellulitis of both lower extremities, Cellulitis of left lower extremity, Cellulitis of lower extremity, CHF (congestive heart failure) ([address] Chronic kidney disease, unspecified, CKD stage 4 secondary to hypertension ([address] Coronary atherosclerosis of native coronary artery, Elevated LFTs, Essential hypertension, Essential hypertension, malignant, Hallucinations, Hard of hearing, Head contusion, Hypertensive emergency, Hypertensive urgency with joy, Hypokalemia, Hypomagnesemia, Iron deficiency anemia, unspecified, Meningioma ([address] Myocardial infarction ([address] Other and unspecified hyperlipidemia, Pure hypercholesterolemia, Stage 4 chronic kidney disease ([address] Toxic metabolic encephalopathy, Type 2 diabetes mellitus with stage 4 chronic kidney disease, without long-term current use of insulin ([address] Type II or unspecified type diabetes mellitus without mention of complication, not stated as uncontrolled, Unspecified asthma(493.90), Unspecified venous (peripheral) insufficiency, Unspecified vitamin D deficiency, and UTI (urinary tract infection). Past Surgical History:  has a past surgical history that includes Tonsillectomy and adenoidectomy; Coronary artery bypass graft; intubation (3/7/2022); Thoracentesis (3/10/2022); Upper gastrointestinal endoscopy (N/A, 3/15/2022); Colonoscopy (N/A, 3/15/2022); IR NONTUNNELED VASCULAR CATHETER > 5 YEARS (6/28/2022); and IR TUNNELED CVC PLACE WO SQ PORT/PUMP > 5 YEARS (7/8/2022). Assessment   Performance deficits / Impairments: Decreased functional mobility ; Decreased endurance;Decreased ADL status; Decreased ROM; Decreased balance;Decreased strength;Decreased safe awareness;Decreased cognition  Assessment: Pt limited by deficits listed above. Pt would benefit from continued therapy throughout acute care stay and after discharge. Pt unsafe to return to prior living situation abased on current level of function and requries 24hr assist  Prognosis: Fair  Decision Making: Medium Complexity  REQUIRES OT FOLLOW-UP: Yes  Activity Tolerance  Activity Tolerance: Patient Tolerated treatment well;Patient limited by fatigue      Education Given To: Patient  Education Provided: Role of Therapy;Plan of Care;Transfer Training;Orientation  Education Method: Verbal  Barriers to Learning: Cognition  Education Outcome: Continued education needed  Safety Devices  Type of Devices: Call light within reach;Nurse notified;Gait belt;Patient at risk for falls; Heels elevated for pressure relief;Bed alarm in place; Left in bed  Restraints  Restraints Initially in Place: No      Plan   Plan  Times per Week: 4-5x/wk  Current Treatment Recommendations: Strengthening,ROM,Balance training,Functional mobility training,Endurance training,Equipment evaluation, education, & procurement,Safety education & training,Patient/Caregiver education & training,Pain management,Cognitive reorientation,Home management training,Self-Care / ADL Restrictions  Restrictions/Precautions  Restrictions/Precautions: Fall Risk,Up as Tolerated  Required Braces or Orthoses?: No  Position Activity Restriction  Other position/activity restrictions: 6/28 emergent intubation and non-tunnelled HD catheter. extubated 7/6 at [address]  Patient assessed for rehabilitation services?: Yes  General Comment  Comments: RN ok'd pt to be seen this AM for therapy. Pt agreeable to therapy with encouragment and cooperative throughout the session. Pt reported pain in back from arthritis.       Social/Functional History  Social/Functional History  Lives With: Son (Pt reports that son or daughter is able to assist. Pt unsure if son is still living in same home as pt.)  Type of Home: [facility] [place]: Two level,Able to Live on Main level with bedroom/bathroom  Home Access: Stairs to enter with rails  Entrance Stairs - Number of Steps: 3  Entrance Stairs - Rails: Right  Bathroom Toilet: Standard  Bathroom Equipment: Grab bars around [address] Accessibility: Accessible  Home Equipment: Cane,Walker, rolling,Wheelchair-manual,Lift chair (Pt provided varying information on what using for functional mobility prior to hospital stay.)  Has the patient had two or more falls in the past year or any fall with injury in the past year?: No  Receives Help From: Family  ADL Assistance: Independent (Pt reports independence with bathing dressing)  Toileting: Independent (Pt reports being independent with toileting)  Homemaking Assistance: Needs assistance (Pt reports that son does homemaking activities)  Homemaking Responsibilities: Yes  Ambulation Assistance: Needs assistance (Pt provided varying information on AD utilized for functional mobility prior to hospital stay.)  Transfer Assistance: Independent  Active : No  Patient's  Info: son or daughter  Mode of Transportation:  (Pt denies any difficulty getting in/out of vehicles)  Education: son's in truck which was harder for pt, required a step stool. 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  Leisure & Hobbies: being a mom and being nosey  Additional Comments: Social/ PLOF provided by pt but pt poor historian      Objective   Bed Mobility Training  Bed Mobility Training: Yes  Overall Level of Assistance: Maximum assistance;Assist X2;Additional time; Adaptive equipment  Interventions: Verbal cues (VC for encouragment)  Rolling: Minimum assistance (Pt performed rolling with min A and use of handrails)  Supine to Sit: Maximum assistance; Additional time;Assist X2;Adaptive equipment (Pt performed supine>sit with HOB raised ~40*, use of bedrails, and max Ax2 from the therapist)  Sit to Supine: Maximum assistance; Additional time;Assist X2;Adaptive equipment (Pt performed sit>supine with max Ax2 with HOB raised ~35* and use of bedrails)    Balance  Sitting:  (Pt seated EOB for ~15min for functional activity performance. Pt required CGA to start and progressed to min A with fatigue)  Standing: Impaired (Pt required max Ax1 and use of RW for standing EOB to adjust sheets. Pt tolerated ~10s of standing.)    Transfer Training  Transfer Training: Yes  Sit to stand: Mod A x2 (3x trials to clear bottom from bed and reach a fully upright position, pt with posterior lean)  Stand to sit: Mod A x2  Comments: Increased time/effort to perform, max VCs for proper hand placement/sequencing/safety awareness with use of RW    Functional Mobility  Overall Level of Assistance:  (Pt did not tolerate functional mobility this date.)  Assistive Device: Walker, rolling       AROM: Within functional limits (BUE)  Strength: Generally decreased, functional (3+/5)  Coordination: Within functional limits (BUE)  Tone: Normal (BUE)  Sensation: Intact (Pt denied any numbness/tingling)     ADL  Feeding: Setup ; Increased time to complete  Grooming: Maximum assistance  Grooming Skilled Clinical Factors: Pt required max A for hair brushing this date. UE Bathing: Maximum assistance; Increased time to complete; Adaptive equipment  LE Bathing: Maximum assistance; Increased time to complete; Adaptive equipment  UE Dressing: Maximum assistance; Adaptive equipment; Increased time to complete  LE Dressing: Maximum assistance; Adaptive equipment; Increased time to complete  Toileting: Maximum assistance    Cognition  Overall Cognitive Status: Exceptions  Following Commands: Follows one step commands consistently  Attention Span: Attends with cues to redirect  Cognition Comment: Pt requires increased time  Orientation  Overall Orientation Status: Impaired  Orientation Level:  (Pt was oriented/disoriented varingly to place and situation throughout the session. )      AM-PAC Score  AM-[facility] Inpatient Daily Activity Raw Score: 13 (07/12/22 [facility])  AM-PAC Inpatient ADL T-Scale Score : 32.03 (07/12/22 [facility])  ADL Inpatient CMS 0-100% Score: 63.03 (07/12/22 [facility])  ADL Inpatient CMS G-Code Modifier : CL (07/12/22 1353)      Goals  Short Term Goals  Time Frame for Short term goals: by discharge  Short Term Goal 1: pt will perform bed mobility with min A and use of least restrictive AD for increasing participation in functional task performance  Short Term Goal 2: pt will perform functional mobility with mod A and use of least restrictive AD for increasing participation in functional task performance  Short Term Goal 3: pt will perform UB ADLs with min A and use of least restrictive AE/DME  Short Term Goal 4: pt will perform LB ADLs with mod A and use of least restrictive AE/DME  Short Term Goal 5: pt will tolerate 3+ minutes of standing during functional task performance utilizing least restrictive AD       Therapy Time   Individual Concurrent Group Co-treatment   Time In 1145         Time Out 1229         Minutes 44         Timed Code Treatment Minutes: 39 Minutes     Evaluation/treatment performed by Student OT under the supervision of co-signing OT who agrees with all evaluation/treatment and documentation.      Shady Lowe S/OT

## 2022-07-12 NOTE — PLAN OF CARE
Problem: Discharge Planning  Goal: Discharge to home or other facility with appropriate resources  7/12/2022 0119 by Seema Crockett RN  Outcome: Progressing  3/75/8325 3489 by Nic Wilde RN  Outcome: Progressing     Problem: Chronic Conditions and Co-morbidities  Goal: Patient's chronic conditions and co-morbidity symptoms are monitored and maintained or improved  7/12/2022 0119 by Seema Crockett RN  Outcome: Progressing  5/50/4505 1531 by Nic Wilde RN  Outcome: Progressing     Problem: Pain  Goal: Verbalizes/displays adequate comfort level or baseline comfort level  7/12/2022 0119 by Seema Crockett RN  Outcome: Progressing  5/28/1689 6591 by Nic Wilde RN  Outcome: Progressing     Problem: Skin/Tissue Integrity  Goal: Absence of new skin breakdown  Description: 1. Monitor for areas of redness and/or skin breakdown  2. Assess vascular access sites hourly  3. Every 4-6 hours minimum:  Change oxygen saturation probe site  4. Every 4-6 hours:  If on nasal continuous positive airway pressure, respiratory therapy assess nares and determine need for appliance change or resting period.   7/12/2022 0119 by Seema Crockett RN  Outcome: Progressing  7/40/3166 9468 by Nic Wilde RN  Outcome: Progressing     Problem: Safety - Adult  Goal: Free from fall injury  7/12/2022 0119 by Seema Crockett RN  Outcome: Progressing  1/25/1683 0363 by Nic Wilde RN  Outcome: Progressing     Problem: ABCDS Injury Assessment  Goal: Absence of physical injury  7/12/2022 0119 by Seema Crockett RN  Outcome: Progressing  8/93/8716 8588 by Nic Wilde RN  Outcome: Progressing     Problem: Respiratory - Adult  Goal: Achieves optimal ventilation and oxygenation  7/12/2022 0119 by Seema Crockett RN  Outcome: Progressing  2/76/7678 1904 by Nic Wilde RN  Outcome: Progressing  7/11/2022 1305 by Imelda Ang RCP  Outcome: Progressing     Problem: Nutrition Deficit:  Goal: Optimize nutritional status  7/12/2022 0119 by Jeanie Jaime RN  Outcome: Progressing  5/36/3290 6440 by Clara Gonzalez RN  Outcome: Progressing

## 2022-07-12 NOTE — PROGRESS NOTES
Nephrology Progress Note        Subjective: Patient is currently a Monday and Wednesday and Friday dialysis patient for acute on chronic kidney injury. The patient has been dialysis dependent since admission for clearance, fluid balance in the setting of fluid overload, electrolyte balance in the setting of hyperkalemia and acidosis. Patient continues to be quite tired. She is undergoing wound care at this time with changes in her lower extremity bandages. No particular chest pain or shortness of breath. She is on a very low flow nasal cannula oxygen at this time. She has been accepted in the outpatient unit apparently now for a Tuesday and Thursday and Saturday schedule at [facility] at an 11:15 AM chair time beginning 2022. Skilled nursing facility placement is still pending.         Objective:  CURRENT TEMPERATURE:  Temp: 98.9 °F (37.2 °C)  MAXIMUM TEMPERATURE OVER 24HRS:  Temp (24hrs), Av.4 °F (36.9 °C), Min:98.1 °F (36.7 °C), Max:98.9 °F (37.2 °C)    CURRENT RESPIRATORY RATE:  Resp: 18  CURRENT PULSE:  Heart Rate: 71  CURRENT BLOOD PRESSURE:  BP: (!) 133/50  24HR BLOOD PRESSURE RANGE:  Systolic (71FBF), IBU:528 , Min:133 , UBJ:440   ; Diastolic (97ETD), GHH:08, Min:47, Max:101    24HR INTAKE/OUTPUT:      Intake/Output Summary (Last 24 hours) at 2022 1130  Last data filed at 2022 1333  Gross per 24 hour   Intake 300 ml   Output 2800 ml   Net -2500 ml     Weight:      Physical Exam:  General appearance:Awake, alert, tired, in no acute distress  Skin: warm and dry, no rash or erythema  Eyes: conjunctivae pale and sclera anicteric  ENT: no thrush, relatively moist mucous membranes  Neck: no JVD, midline trachea no accessory muscle use  Pulmonary: good bilateral air entry and doubt a few scattered bilateral crackles with diminished breath sounds at the bases  Cardiovascular: regular rate and rhythm with positive S1 and S2 and no rubs  Abdomen: soft and non-tender and non-distended with active bowel sounds  Extremities: mild lower extremity edema, dressings in place    Access:  previous permacath    Current Medications:    QUEtiapine (SEROQUEL) tablet 12.5 mg, Nightly  guaiFENesin (MUCINEX) extended release tablet 600 mg, BID  haloperidol lactate (HALDOL) injection 1 mg, Q6H PRN  gelocast unnas boot 1 each, Once  heparin (porcine) injection 1,600 Units, PRN  heparin (porcine) injection 1,600 Units, PRN  polyethylene glycol (GLYCOLAX) packet 17 g, BID  hydrocortisone (ANUSOL-HC) suppository 25 mg, BID  pantoprazole (PROTONIX) tablet 40 mg, QAM AC  apixaban (ELIQUIS) tablet 5 mg, BID  lidocaine (XYLOCAINE) 2 % jelly, PRN  nitroglycerin (NITRO-BID) 2 % ointment 0.5 inch, BID PRN  bisacodyl (DULCOLAX) suppository 10 mg, Daily PRN  metoprolol tartrate (LOPRESSOR) tablet 25 mg, BID  epoetin rick-epbx (RETACRIT) injection 5,000 Units, Q MWF  0.9 % sodium chloride infusion, PRN  albumin human 25 % IV solution 25 g, PRN  levothyroxine (SYNTHROID) tablet 25 mcg, Daily  sodium chloride flush 0.9 % injection 5-40 mL, PRN  0.9 % sodium chloride infusion, PRN  0.9 % sodium chloride infusion, PRN  amiodarone (CORDARONE) tablet 100 mg, Daily  fentaNYL (SUBLIMAZE) injection 50 mcg, Q2H PRN  albuterol (PROVENTIL) nebulizer solution 2.5 mg, Q6H  glucose chewable tablet 16 g, PRN  dextrose bolus 10% 125 mL, PRN   Or  dextrose bolus 10% 250 mL, PRN  glucagon (rDNA) injection 1 mg, PRN  dextrose 5 % solution, PRN  sodium chloride flush 0.9 % injection 5-40 mL, PRN  0.9 % sodium chloride infusion, PRN  ondansetron (ZOFRAN-ODT) disintegrating tablet 4 mg, Q8H PRN   Or  ondansetron (ZOFRAN) injection 4 mg, Q6H PRN  acetaminophen (TYLENOL) tablet 650 mg, Q6H PRN   Or  acetaminophen (TYLENOL) suppository 650 mg, Q6H PRN  aspirin EC tablet 81 mg, Daily  atorvastatin (LIPITOR) tablet 40 mg, Daily  magnesium oxide (MAG-OX) tablet 400 mg, BID  miconazole (MICOTIN) 2 % powder, BID  ferrous sulfate (FE TABS 325) EC tablet 325 mg, Daily with breakfast  morphine (PF) injection 2 mg, Q4H PRN      Labs:   CBC:   Recent Labs     07/10/22  0247 07/11/22  0626 07/12/22  0545   WBC 8.9 8.9 9.8   RBC 2.82* 2.79* 2.89*   HGB 8.4* 8.5* 8.9*   HCT 28.1* 27.6* 27.7*   MCV 99.6 98.9 95.8   MCH 29.8 30.5 30.8   MCHC 29.9 30.8 32.1   RDW 16.8* 16.7* 16.4*    312 250   MPV 9.2 9.5 9.0      BMP:   Recent Labs     07/10/22  0247 07/11/22  0626 07/12/22  0545    137 137   K 3.7 3.9 3.6*   CL 99 99 103   CO2 24 25 26   BUN 24* 31* 15   CREATININE 2.14* 2.81* 2.00*   GLUCOSE 89 91 98   CALCIUM 8.4* 8.6 8.2*        Phosphorus:  No results for input(s): PHOS in the last 72 hours. Magnesium: No results for input(s): MG in the last 72 hours. Albumin: No results for input(s): LABALBU in the last 72 hours. Dialysis bath: Dialysis Bath  K+ (Potassium): 3  Ca+ (Calcium): 2.25  Na+ (Sodium): 140  HCO3 (Bicarb): 30    Radiology:  Reviewed as available. Assessment:  1. Hemodialysis dependent acute kidney injury in the setting of ATN plus/minus progressive diabetic nephropathy with no sign of recovery at this point in time with the patient currently on a Monday Wednesday and Friday dialysis schedule with outpatient schedule to be a Tuesday and Thursday and Saturday at [facility]. Skilled nursing facility placement pending. 2. Volume overload improving  3. Nephrotic range proteinuria secondary to diabetic nephropathy   4. Ventilator dependent acute respiratory failure earlier in the admission which has improved with the patient on low flow oxygen by nasal came  5. Hypotension requiring vasopressor support earlier in the admission, resolved  6. Wound care ongoing for nonhealing lower extremity ulcers in the setting of chronic cellulitic change       Plan:  1. Hemodialysis tomorrow   2. Await skilled nursing facility placement  3. Continue slow driveway challenge  4.  Patient will need ongoing hemodialysis on a regular basis, at least in the near term and possibly on a permanent basis      Please do not hesitate to call with questions.     Electronically signed by Suze Peralta MD on 7/12/2022 at 11:30 AM

## 2022-07-12 NOTE — PROGRESS NOTES
Physical Therapy        Physical Therapy Cancel Note      DATE: 2022    NAME: Kishore Ortiz  MRN: [id number]   : 1950      Patient not seen this date for Physical Therapy due to:    Patient Declined: Pt refused to participate in PT due to c/o increased fatigue, pt stated she \"just worked with OT. \"      Electronically signed by Adore Rodrigues PTA on 2022 at 4:15 PM

## 2022-07-12 NOTE — PROGRESS NOTES
Wound 05/20/22 Right Wound #1 right lower  leg cluster- converged with #2   Date First Assessed: 05/20/22   Present on Hospital Admission: Yes  Wound Approximate Age at First Assessment (Weeks): 0.5 weeks  Primary Wound Type: Venous Ulcer  Wound Location Orientation: Right  Wound Description (Comments): Wound #1 right lower  . .. Wound Image    Wound Etiology Venous   Dressing Status New dressing applied   Wound Cleansed Soap and water   Dressing/Treatment Foam  (calamine unna's boot)   Dressing Change Due 07/19/22   Wound Length (cm) 3.9 cm   Wound Width (cm) 0.5 cm   Wound Depth (cm) 0.1 cm   Wound Surface Area (cm^2) 1.95 cm^2   Change in Wound Size % (l*w) 87   Wound Volume (cm^3) 0.195 cm^3   Wound Healing % 87   Wound Assessment Epithelialization;Superficial;Pink/red   Drainage Amount Scant   Drainage Description Serosanguinous   Odor None   Marian-wound Assessment Intact   Wound 05/20/22 Left;Proximal Wound # 4 Left lower lat leg   Date First Assessed: 05/20/22   Present on Hospital Admission: Yes  Wound Approximate Age at First Assessment (Weeks): 0.5 weeks  Primary Wound Type: Venous Ulcer  Wound Location Orientation: Left;Proximal  Wound Description (Comments): Wound # 4 Left. ..    Wound Image    Wound Etiology Venous   Dressing Status New dressing applied   Wound Cleansed Soap and water   Dressing/Treatment Foam  (calamine unna's boot)   Dressing Change Due 07/19/22   Wound Length (cm) 0.7 cm   Wound Width (cm) 0.3 cm   Wound Depth (cm) 0.1 cm   Wound Surface Area (cm^2) 0.21 cm^2   Change in Wound Size % (l*w) 98.58   Wound Volume (cm^3) 0.021 cm^3   Wound Healing % 99   Wound Assessment Pink/red;Superficial;Epithelialization   Drainage Amount Scant   Drainage Description Serosanguinous   Odor None   Marian-wound Assessment Intact   Wound 06/27/22 Buttocks Right;Mid small open pale wound   Date First Assessed/Time First Assessed: 06/27/22 1630   Primary Wound Type: Pressure Injury  Location: Buttocks Wound Location Orientation: Right;Mid  Wound Description (Comments): small open pale wound   Wound Image    Wound Etiology Pressure Unstageable   Dressing Status New dressing applied   Wound Cleansed Soap and water   Dressing/Treatment Honey gel/honey paste; Foam   Dressing Change Due 07/14/22   Wound Length (cm) 3.5 cm   Wound Width (cm) 3.5 cm   Wound Depth (cm) 0.2 cm   Wound Surface Area (cm^2) 12.25 cm^2   Change in Wound Size % (l*w) -104.17   Wound Volume (cm^3) 2.45 cm^3   Wound Healing % -172   Wound Assessment Superficial;Pink/red;Eschar dry;Non-blanchable erythema   Drainage Amount Scant   Drainage Description Serosanguinous   Odor None   Marian-wound Assessment Intact; Blanchable erythema   Wound 07/05/22 Foot Left;Lateral   Date First Assessed/Time First Assessed: 07/05/22 1522   Primary Wound Type: Pressure Injury  Location: Foot  Wound Location Orientation: Left;Lateral   Wound Image    Wound Etiology Pressure Unstageable   Dressing Status New dressing applied   Wound Cleansed Soap and water   Dressing/Treatment Foam  (calamine unna's boot)   Dressing Change Due 07/19/22   Wound Length (cm) 0.3 cm   Wound Width (cm) 0.6 cm   Wound Depth (cm) 0.1 cm   Wound Surface Area (cm^2) 0.18 cm^2   Change in Wound Size % (l*w) 0   Wound Volume (cm^3) 0.018 cm^3   Wound Assessment Subcutaneous; Purple/maroon;Fibrinous   Drainage Amount Scant   Drainage Description Serosanguinous   Odor None   Marian-wound Assessment Intact            Response to treatment:  Well tolerated by patient., With complaints of pain. Plan:     Plan of Care:   Turn every 2 hours  Float heels off of bed with pillows under calves     Use lift sling to reposition patient to minimize potential for shear injury.     Sacrococcygeal wounds: Zinc oxide paste BID and prn incontinence care.     Routine incontinence care with incontinence barrier cloths   Moisture wicking under pads vs cloth backed briefs     BLE Unna's Boots compression dressings. Leave in place for one week. Notify the [id number] [facility] [address] [facility] [address] RN on-call via [address] [place] [address] for any concerns or drainage strike-through.     sacrococcygeal wound: wash with soap and water, rinse well. Cover with Medihoney HCS gel sheet, secure with a 8V3\" Optifoam silicone bordered foam. Change every 2 days.         Specialty Bed Required : Yes   []? ? Low Air Loss   [x]? ? Pressure Redistribution  []? ? Fluid Immersion  []? ? Bariatric  []? ? Total Pressure Relief  []? ? Other:      Discharge Plan:  TBD     Patient/Caregiver Teaching:  D/w daughter at bedside  []? ? Indicates understanding                []? ? Needs reinforcement  []? ? Unsuccessful                                  [x]? ? Verbal Understanding  []? ? Demonstrated understanding        []? ? No evidence of learning  []? ? Refused teaching                           []? ? N/A       Electronically signed by Yeny Ham RN, [facility] on 7/12/2022 at 12:34 PM

## 2022-07-12 NOTE — DISCHARGE INSTR - DIET

## 2022-07-12 NOTE — PROGRESS NOTES
Comprehensive Nutrition Assessment    Type and Reason for Visit:  Reassess    Nutrition Recommendations/Plan:   1. Continue current Dysphagia Soft and Bite-Sized diet - liberalize diet as able to encourage PO intakes. 2. Will provide frozen Magic Cups with all meals and Ensure Clear ONS x 1 per day. 3. Monitor labs, weights, and plan of care. Malnutrition Assessment:  Malnutrition Status: At risk for malnutrition (Comment) (07/08/22 1521)    Context:  Acute Illness       Nutrition Assessment:    Pt had a MBSS completed on 7/10 which she passed for a Dysphagia Soft and Bited-Sized diet with thin liquids. Pt has been eating less than 50% of meals. Noted no oral supplements ordered - will order Ensure Clear and frozen Magic Cups for pt to try. Pt with some confusion - noted did not sleep last night. Weiht fluctuations noted - likely due to dialysis and fluids. Labs reviewed: K 3.6 mmol/L. Meds reviewed. Nutrition Related Findings:    Labs/Meds reviewed. Last BM 7/11. Wound Type: Multiple,Pressure Injury,Unstageable (to right buttocks and left foot; venous stasis to bilateral legs)       Current Nutrition Intake & Therapies:    Average Meal Intake: 1-25%,26-50%  Average Supplements Intake: None Ordered  ADULT DIET; Dysphagia - Soft and Bite Sized; Low Sodium (2 gm); Low Potassium (Less than 3000 mg/day); Low Phosphorus (Less than 1000 mg); 1800 ml    Anthropometric Measures:  Height: 4' 11\" (149.9 cm)  Ideal Body Weight (IBW): 95 lbs (43 kg)    Admission Body Weight: 151 lb 1.7 oz (68.5 kg)  Current Body Weight: 138 lb 14.2 oz (63 kg), 146.2 % IBW. Weight Source: Bed Scale  Current BMI (kg/m2): 28        Weight Adjustment For: No Adjustment                 BMI Categories: Overweight (BMI 25.0-29. 9)    Estimated Daily Nutrient Needs:  Energy Requirements Based On: Kcal/kg  Weight Used for Energy Requirements: Current  Energy (kcal/day): 3341-0989 kcals/day  Weight Used for Protein Requirements: Ideal  Protein (g/day): 65 g pro/day  Fluid (ml/day): 1800 mL/day per dier order/MD    Nutrition Diagnosis:   · Inadequate oral intake related to pain (appetite; current condition) as evidenced by intake 0-25%,intake 26-50% (variable PO intakes; need for ONS)    Nutrition Interventions:   Food and/or Nutrient Delivery: Continue Current Diet,Start Oral Nutrition Supplement  Nutrition Education/Counseling: No recommendation at this time  Coordination of Nutrition Care: Continue to monitor while inpatient  Plan of Care discussed with: -    Goals:  Previous Goal Met: No Progress toward Goal(s)  Goals: Meet at least 75% of estimated needs,within 7 days       Nutrition Monitoring and Evaluation:   Behavioral-Environmental Outcomes: None Identified  Food/Nutrient Intake Outcomes: Food and Nutrient Intake,Supplement Intake  Physical Signs/Symptoms Outcomes: Biochemical Data,Chewing or Swallowing,GI Status,Fluid Status or Edema,Nutrition Focused Physical Findings,Skin,Weight    Discharge Planning:     Too soon to determine     Juan MILLER Wade, LD  Contact: [phone]

## 2022-07-12 NOTE — PROGRESS NOTES
PULMONARY & CRITICAL CARE MEDICINE PROGRESS NOTE     Patient:  Steve Lees  MRN: [id number]  Admit date: 6/27/2022  Primary Care Physician: Zelda Purcell MD  Consulting Physician: Chito Ambrose DO  CODE Status: Full Code  LOS: 15     SUBJECTIVE     Chief Complaint/ Reason for consult:    Admitted to ICU with bradycardia/acute respiratory failure    Hospital Course: The patient is a 66 y.o. female with prior history of of CKD stage IV( baseline creatinine 1.8-2), chronic diastolic CHF, hypertension, CAD s/p CABG in  in 2003 and occluded SVG to OM 2 and cath in 2006, COPD, type 2 diabetes, A. fib on amiodarone and Eliquis  Presented to ED on 6/27/2021 with complaints of shortness of breath hypoxia improved on 90s, and fatigue. Was found to be bradycardic heart rate in 35 by EMS and was placed on transcutaneous pacing and received atropine. She was subsequently started on epinephrine drip and admitted to the ICU. X-ray consistent with pulmonary edema. Cardiology was consulted and switched to dopamine drip, and Eliquis was switched to heparin drip. Was also noted to be hyperkalemic and started on hemodialysis sec to worsening CKD on 06/28/22. Neurology was consulted for encephalopathy : noted to have right anterior temporal fossa meningioma with possible edema. MRI considered nonemergent and deferred.,  ID was consulted for leukocytosis and b/l LE wounds which are chronic(sees wound care clinic outpatient) and Right gluteal /coccygeal ulcerations. Started on Cefepime. -On 6/28/2022 she had progressively worsening mentation with worsening hypoxia and hypercapnia she was intubated and started on HD. Heparin was held on 6/29-07/01 secondary to oozing at right IJ dialysis catheter site. Patient was continued on empiric antibiotics through 7/1/22(urine and blood cx NG), dialysis/UF and subsequently extubated on 07/06/22. Received 1u PRBC 7/7/22 for hb 7.0. transferred to floor on 7/8/22.      Interval History:  22  Seen and examined   No acute events   Reportedly confused overnight  Started on seroquel  On 3L nc  Alert and oriented now  Got dialysis yesterday      Review Of Systems:  Review of Systems   Constitutional: Positive for activity change. Negative for chills and fever. HENT: Negative for postnasal drip, sore throat, trouble swallowing and voice change. Eyes: Negative for photophobia, redness and visual disturbance. Respiratory: Positive for cough. Negative for shortness of breath and wheezing. Cardiovascular: Negative for chest pain, palpitations and leg swelling. Gastrointestinal: Negative for abdominal distention, abdominal pain, constipation, diarrhea and vomiting. Endocrine: Negative for polydipsia, polyphagia and polyuria. Neurological: Negative for dizziness, syncope, speech difficulty and numbness. OBJECTIVE     PaO2/FiO2 RATIO:  No results for input(s): POCPO2 in the last 72 hours. FiO2 : 28 %     VITAL SIGNS:   LAST:  BP (!) 133/50   Pulse 71   Temp 98.9 °F (37.2 °C) (Oral)   Resp 18   Ht 4' 11\" (1.499 m)   Wt 138 lb 14.2 oz (63 kg)   SpO2 91%   BMI 28.05 kg/m²   8-24 HR RANGE:  TEMP Temp  Av.4 °F (36.9 °C)  Min: 98.1 °F (36.7 °C)  Max: 98.9 °F (24.0 °C)   BP Systolic (85TUU), AJB:286 , Min:133 , WXC:123      Diastolic (20FNL), YFO:55, Min:47, Max:71     PULSE Pulse  Av.7  Min: 71  Max: 99   RR Resp  Av  Min: 10  Max: 18   O2 SAT SpO2  Av %  Min: 91 %  Max: 100 %   OXYGEN DELIVERY O2 Flow Rate (L/min)  Avg: 3 L/min  Min: 3 L/min  Max: 3 L/min        Systemic Examination:   Physical Exam -  Constitutional: Alert and awake look comfortable no distress   Mental Status:  Oriented to person, place and time and normal affect. Lungs:  Bilateral air entry present, lung fields clear. Normal effort. Slightly decreased at bases mild upper airway sound present  Heart: Irregularly irregular rhythm, no murmur.   Abdomen:  Soft, nontender, nondistended, normal bowel sounds. Extremities: Positive edema of upper extremities, Ace wrap present in both lower extremities. Skin:  Warm, dry, no gross lesions or rashes. DATA REVIEW     Medications: Current Inpatient  Scheduled Meds:   QUEtiapine  12.5 mg Oral Nightly    guaiFENesin  600 mg Oral BID    polyethylene glycol  17 g Oral BID    hydrocortisone  25 mg Rectal BID    pantoprazole  40 mg Oral QAM AC    apixaban  5 mg Oral BID    metoprolol tartrate  25 mg Oral BID    epoetin rick-epbx  5,000 Units IntraVENous Q MWF    levothyroxine  25 mcg Oral Daily    amiodarone  100 mg Oral Daily    albuterol  2.5 mg Nebulization Q6H    aspirin EC  81 mg Oral Daily    atorvastatin  40 mg Oral Daily    magnesium oxide  400 mg Oral BID    miconazole   Topical BID    ferrous sulfate  325 mg Oral Daily with breakfast     Continuous Infusions:   sodium chloride      sodium chloride      sodium chloride      dextrose      sodium chloride 75 mL/hr at 07/06/22 2000       INPUT/OUTPUT:  In: 300   Out: 2800        LABS:  ABGs:   No results for input(s): POCPH, POCPCO2, POCPO2, POCHCO3, AQYD4DBP in the last 72 hours. CBC:   Recent Labs     07/10/22  0247 07/11/22  0626 07/12/22  0545   WBC 8.9 8.9 9.8   HGB 8.4* 8.5* 8.9*   HCT 28.1* 27.6* 27.7*   MCV 99.6 98.9 95.8    312 250   LYMPHOPCT 12* 11* 10*   RBC 2.82* 2.79* 2.89*   MCH 29.8 30.5 30.8   MCHC 29.9 30.8 32.1   RDW 16.8* 16.7* 16.4*     CRP:   No results for input(s): CRP in the last 72 hours. LDH:   No results for input(s): LDH in the last 72 hours. BMP:   Recent Labs     07/10/22  0247 07/11/22  0626 07/12/22  0545    137 137   K 3.7 3.9 3.6*   CL 99 99 103   CO2 24 25 26   BUN 24* 31* 15   CREATININE 2.14* 2.81* 2.00*   GLUCOSE 89 91 98     Liver Function Test:   No results for input(s): PROT, LABALBU, ALT, AST, GGT, ALKPHOS, BILITOT in the last 72 hours.   Coagulation Profile:   Recent Labs     07/09/22  1925 07/10/22  8474 07/10/22  0851   APTT 82.7* 55.3* 51.2*     D-Dimer:  No results for input(s): DDIMER in the last 72 hours. Lactic Acid:  No results for input(s): LACTA in the last 72 hours. Cardiac Enzymes:  No results for input(s): CKTOTAL, CKMB, CKMBINDEX, TROPONINI in the last 72 hours. Invalid input(s): TROPONIN, HSTROP  BNP/ProBNP:   No results for input(s): BNP, PROBNP in the last 72 hours. Triglycerides:  No results for input(s): TRIG in the last 72 hours. Microbiology:  Urine Culture:  No components found for: CURINE  Blood Culture:  No components found for: CBLOOD, CFUNGUSBL  Sputum Culture:  No components found for: CSPUTUM  No results for input(s): SPECDESC, SPECIAL, CULTURE, STATUS, ORG, CDIFFTOXPCR, CAMPYLOBPCR, SALMONELLAPC, SHIGAPCR, SHIGELLAPCR, MPNEUG, MPNEUM, LACTOQL in the last 72 hours. No results for input(s): SPUTUM, SPECDESC, SPECIAL, CULTURE, STATUS, ORG, CDIFFTOXPCR, MPNEUM, MPNEUG in the last 72 hours. Invalid input(s): CURINE, CBLOOD, CFUNGUSBL     Pathology:    Radiology Reports:  XR CHEST PORTABLE   Final Result   Findings are compatible with pulmonary edema. Bilateral pleural effusions. FL MODIFIED BARIUM SWALLOW W VIDEO   Final Result   1. Trace flash penetration without aspiration with the thin liquid substance   by straw. 2. No penetration or aspiration with the remainder of the administered   substances. Please see separate speech pathology report for full discussion of findings   and recommendations. IR TUNNELED CVC PLACE WO SQ PORT/PUMP > 5 YEARS   Final Result   Successful ultrasound and fluoroscopy guided tunneled catheter placement . Okay to use. XR CHEST PORTABLE   Final Result   Mild increased perihilar edema, mild increased perihilar opacification,   likely edema and CHF. Persistent small effusions and bibasilar atelectasis. Satisfactory position of endotracheal tube.          XR CHEST PORTABLE   Final Result   Stable support lines Slight progression in now moderate diffuse bilateral interstitial infiltrates   related to increasing edema and/or infection with moderate bilateral pleural   effusions         XR CHEST PORTABLE   Final Result   Scattered infiltrates with bilateral effusions. Support tubes as described above. US RENAL COMPLETE   Final Result   Chronic renal disease. Small renal cysts. 1.4 cm right renal stone. No   hydronephrosis. IR NONTUNNELED VASCULAR CATHETER > 5 YEARS   Final Result   Successful ultrasound and fluoroscopy guided 13.5 [place] by 15 cm non   tunneled hemodialysis catheter placement. Ready for use. XR CHEST PORTABLE   Final Result   1. Endotracheal tube with the tip just entering the right mainstem bronchus. Recommend retraction by 4 cm. 2.  Right lower lobe airspace consolidation and small bilateral pleural   effusions. These findings were discussed with the inpatient nurse Cristy Gomez at 1637   hours on 28 June 2022         XR ABDOMEN FOR NG/OG/NE TUBE PLACEMENT   Final Result   NG tube position, as detailed         XR CHEST PORTABLE   Final Result   Slight improvement in still moderate diffuse bilateral interstitial   infiltrates related to improving pulmonary edema with decreasing still   moderate right and mild to moderate left pleural effusions         CT HEAD WO CONTRAST   Final Result   No acute intracranial hemorrhage or hydrocephalus. Background mild chronic   small vessel white matter changes with remote lacunar insults in the right   caudate and left basal ganglia, unchanged from prior. 2.6 cm anterior right temporal meningioma with suggested mild increase in the   vasogenic edema along the anterior right temporal lobe relative to 4 months   prior. This could be further assessed with a contrast-enhanced brain MRI.          XR CHEST PORTABLE   Final Result   Mixed interstitial and alveolar opacities throughout both lungs, increased   from 3 weeks prior.  Correlate for edema or contributory infection. Bilateral small left greater than right pleural effusions which are also   increased from 3 weeks prior. Unchanged cardiomegaly. Echocardiogram:   Results for orders placed during the hospital encounter of 02/06/22    ECHO Complete 2D W Doppler W Color    Narrative  [facility]    Transthoracic Echocardiography Report (TTE)    Patient Name [id number]       Date of Study                 02/07/2022  Chanel Carlos    Date of      1950  Gender                        Female  Birth    Age          66 year(s)  Race                              Room Number  2090        Height:                       58.66 inch, 149 cm    Corporate ID [id number]    Weight:                       132 pounds, 60 kg  #    Patient Acct [de-identified]   BSA:           1.54 m^2       BMI:      27.03  #                                                                kg/m^2    MR #         [id number]      Sonographer                   AlbertSaira    Accession #  [id number]  Interpreting Physician        Jaida Gudino    Fellow                   Referring Nurse Practitioner    Interpreting             Referring Physician           Jann Duong  Fellow                                                 Chelsi Farooq    Type of Study    TTE procedure:2D Echocardiogram, M-Mode, Doppler, Color Doppler. Procedure Date  Date: 02/07/2022 Start: 11:37 AM    Study Location: [address] [place] [address]  Technical Quality: Fair visualization    Indications:Congestive heart failure. History / Tech. Comments:  CABG DM CKD COPD HTN AF    Patient Status: Inpatient    Height: 58.66 inches Weight: 132.3 pounds BSA: 1.54 m^2 BMI: 27.03 kg/m^2    Rhythm: Within normal limits    Allergies  - Lisinopril. - Keflex. - *Unlisted:(Aleve,Claratin, Pioglitazone). CONCLUSIONS    Summary  Normal left ventricle size and function with an estimated EF > 55%.   No segmental wall filling  pressure . M-mode / 2D Measurements & Calculations:    LVIDd:4 cm(3.7 - 5.6 cm)         Diastolic PFPAUU:94 ml  ECQKB:5.66 cm(2.2 - 4.0 cm)      Systolic DEDJIH:09.8 ml  BCFI:3.58 cm(0.6 - 1.1 cm)       Aortic Root:2.9 cm(2.0 - 3.7 cm)  LVPWd:1.53 cm(0.6 - 1.1 cm)      LA Dimension: 5.4 cm(1.9 - 4.0 cm)  Fractional Shortenin %       LA volume/Index: 56.5 ml /37m^2  Calculated LVEF (%): 73.75 %     LVOT:2.2 cm    Mitral:                                 Aortic    Valve Area (P1/2-Time): 2.68 cm^2       Peak Velocity: 2.09 m/s  Peak E-Wave: 1.24 m/s                   Mean Velocity: 1.34 m/s  Peak A-Wave: 0.92 m/s                   Peak Gradient: 17.47 mmHg  E/A Ratio: 1.35                         Mean Gradient: 9 mmHg  Peak Gradient: 6.15 mmHg                AI P1/2t: 325 msec  Mean Gradient: 3 mmHg  Deceleration Time: 169 msec             Area (continuity): 1.84 cm^2  P1/2t: 82 msec                          AV VTI: 51.2 cm    Area (continuity): 2.32 cm^2  Mean Velocity: 0.85 m/s    Tricuspid:    Peak TR Velocity: 3.03 m/s  Peak TR Gradient: 36.7236 mmHg    Septal Wall E' velocity:0.07 m/s  Lateral Wall E' velocity:0.09 m/s       ASSESSMENT AND PLAN     Assessment:    //Bradycardia  //Chronic lymphedema  //JOY on CKD, hyperkalemia requiring hemodialysis  //Status post tunneled catheter placement  //Metabolic encephalopathy  //Acute hypoxic hypercapnic respiratory failure  //Acute on chronic CHF  //High risk of aspiration  //Leukocytosis -unclear etiology, infectious dease following      Plan: On dysphagia diet   continue IS and deep breathing   HD and volume status management per nerology    Jeffrey Henderson MD  INTERNAL MEDICINE RESIDENT, [address] [place] [address]   2022    Please note that this chart was generated using voice recognition Dragon dictation software.   Although every effort was made to ensure the accuracy of this automated transcription, some errors in transcription may have occurred.

## 2022-07-13 LAB
ABSOLUTE EOS #: 0.12 K/UL (ref 0–0.44)
ABSOLUTE IMMATURE GRANULOCYTE: 0.18 K/UL (ref 0–0.3)
ABSOLUTE LYMPH #: 1.1 K/UL (ref 1.1–3.7)
ABSOLUTE MONO #: 0.81 K/UL (ref 0.1–1.2)
ANION GAP SERPL CALCULATED.3IONS-SCNC: 8 MMOL/L (ref 9–17)
BASOPHILS # BLD: 1 % (ref 0–2)
BASOPHILS ABSOLUTE: 0.05 K/UL (ref 0–0.2)
BUN BLDV-MCNC: 24 MG/DL (ref 8–23)
CALCIUM SERPL-MCNC: 8.5 MG/DL (ref 8.6–10.4)
CHLORIDE BLD-SCNC: 104 MMOL/L (ref 98–107)
CO2: 25 MMOL/L (ref 20–31)
CREAT SERPL-MCNC: 2.83 MG/DL (ref 0.5–0.9)
EOSINOPHILS RELATIVE PERCENT: 1 % (ref 1–4)
GFR AFRICAN AMERICAN: 20 ML/MIN
GFR NON-AFRICAN AMERICAN: 16 ML/MIN
GFR SERPL CREATININE-BSD FRML MDRD: ABNORMAL ML/MIN/{1.73_M2}
GLUCOSE BLD-MCNC: 104 MG/DL (ref 70–99)
GLUCOSE BLD-MCNC: 109 MG/DL (ref 65–105)
GLUCOSE BLD-MCNC: 119 MG/DL (ref 65–105)
GLUCOSE BLD-MCNC: 120 MG/DL (ref 65–105)
HCT VFR BLD CALC: 28.9 % (ref 36.3–47.1)
HEMOGLOBIN: 8.8 G/DL (ref 11.9–15.1)
IMMATURE GRANULOCYTES: 2 %
LYMPHOCYTES # BLD: 12 % (ref 24–43)
MCH RBC QN AUTO: 30.4 PG (ref 25.2–33.5)
MCHC RBC AUTO-ENTMCNC: 30.4 G/DL (ref 28.4–34.8)
MCV RBC AUTO: 100 FL (ref 82.6–102.9)
MONOCYTES # BLD: 9 % (ref 3–12)
NRBC AUTOMATED: 0 PER 100 WBC
PDW BLD-RTO: 16.7 % (ref 11.8–14.4)
PHOSPHORUS: 3.1 MG/DL (ref 2.6–4.5)
PLATELET # BLD: 285 K/UL (ref 138–453)
PMV BLD AUTO: 9 FL (ref 8.1–13.5)
POTASSIUM SERPL-SCNC: 3.9 MMOL/L (ref 3.7–5.3)
RBC # BLD: 2.89 M/UL (ref 3.95–5.11)
RBC # BLD: ABNORMAL 10*6/UL
SEG NEUTROPHILS: 75 % (ref 36–65)
SEGMENTED NEUTROPHILS ABSOLUTE COUNT: 6.95 K/UL (ref 1.5–8.1)
SODIUM BLD-SCNC: 137 MMOL/L (ref 135–144)
WBC # BLD: 9.2 K/UL (ref 3.5–11.3)

## 2022-07-13 PROCEDURE — 6370000000 HC RX 637 (ALT 250 FOR IP): Performed by: STUDENT IN AN ORGANIZED HEALTH CARE EDUCATION/TRAINING PROGRAM

## 2022-07-13 PROCEDURE — 6370000000 HC RX 637 (ALT 250 FOR IP): Performed by: FAMILY MEDICINE

## 2022-07-13 PROCEDURE — 2700000000 HC OXYGEN THERAPY PER DAY

## 2022-07-13 PROCEDURE — 99231 SBSQ HOSP IP/OBS SF/LOW 25: CPT | Performed by: INTERNAL MEDICINE

## 2022-07-13 PROCEDURE — 97530 THERAPEUTIC ACTIVITIES: CPT

## 2022-07-13 PROCEDURE — 6370000000 HC RX 637 (ALT 250 FOR IP): Performed by: INTERNAL MEDICINE

## 2022-07-13 PROCEDURE — 6360000002 HC RX W HCPCS: Performed by: INTERNAL MEDICINE

## 2022-07-13 PROCEDURE — 97110 THERAPEUTIC EXERCISES: CPT

## 2022-07-13 PROCEDURE — 94761 N-INVAS EAR/PLS OXIMETRY MLT: CPT

## 2022-07-13 PROCEDURE — 84100 ASSAY OF PHOSPHORUS: CPT

## 2022-07-13 PROCEDURE — 99232 SBSQ HOSP IP/OBS MODERATE 35: CPT | Performed by: INTERNAL MEDICINE

## 2022-07-13 PROCEDURE — 94640 AIRWAY INHALATION TREATMENT: CPT

## 2022-07-13 PROCEDURE — 82947 ASSAY GLUCOSE BLOOD QUANT: CPT

## 2022-07-13 PROCEDURE — 85025 COMPLETE CBC W/AUTO DIFF WBC: CPT

## 2022-07-13 PROCEDURE — 2060000000 HC ICU INTERMEDIATE R&B

## 2022-07-13 PROCEDURE — 36415 COLL VENOUS BLD VENIPUNCTURE: CPT

## 2022-07-13 PROCEDURE — 2580000003 HC RX 258: Performed by: STUDENT IN AN ORGANIZED HEALTH CARE EDUCATION/TRAINING PROGRAM

## 2022-07-13 PROCEDURE — 80048 BASIC METABOLIC PNL TOTAL CA: CPT

## 2022-07-13 RX ADMIN — PANTOPRAZOLE SODIUM 40 MG: 40 TABLET, DELAYED RELEASE ORAL at 06:09

## 2022-07-13 RX ADMIN — ALBUTEROL SULFATE 2.5 MG: 2.5 SOLUTION RESPIRATORY (INHALATION) at 15:13

## 2022-07-13 RX ADMIN — POLYETHYLENE GLYCOL 3350 17 G: 17 POWDER, FOR SOLUTION ORAL at 21:06

## 2022-07-13 RX ADMIN — SODIUM CHLORIDE, PRESERVATIVE FREE 10 ML: 5 INJECTION INTRAVENOUS at 21:07

## 2022-07-13 RX ADMIN — FERROUS SULFATE TAB EC 325 MG (65 MG FE EQUIVALENT) 325 MG: 325 (65 FE) TABLET DELAYED RESPONSE at 12:04

## 2022-07-13 RX ADMIN — METOPROLOL TARTRATE 25 MG: 25 TABLET ORAL at 08:36

## 2022-07-13 RX ADMIN — GUAIFENESIN 600 MG: 600 TABLET, EXTENDED RELEASE ORAL at 08:37

## 2022-07-13 RX ADMIN — APIXABAN 5 MG: 5 TABLET, FILM COATED ORAL at 08:37

## 2022-07-13 RX ADMIN — ALBUTEROL SULFATE 2.5 MG: 2.5 SOLUTION RESPIRATORY (INHALATION) at 20:53

## 2022-07-13 RX ADMIN — ANTI-FUNGAL POWDER MICONAZOLE NITRATE TALC FREE: 1.42 POWDER TOPICAL at 21:06

## 2022-07-13 RX ADMIN — METOPROLOL TARTRATE 25 MG: 25 TABLET ORAL at 21:06

## 2022-07-13 RX ADMIN — ANTI-FUNGAL POWDER MICONAZOLE NITRATE TALC FREE: 1.42 POWDER TOPICAL at 08:38

## 2022-07-13 RX ADMIN — ACETAMINOPHEN 325MG 650 MG: 325 TABLET ORAL at 12:03

## 2022-07-13 RX ADMIN — GUAIFENESIN 600 MG: 600 TABLET, EXTENDED RELEASE ORAL at 21:06

## 2022-07-13 RX ADMIN — MAGNESIUM GLUCONATE 500 MG ORAL TABLET 400 MG: 500 TABLET ORAL at 21:06

## 2022-07-13 RX ADMIN — APIXABAN 5 MG: 5 TABLET, FILM COATED ORAL at 21:06

## 2022-07-13 RX ADMIN — Medication 81 MG: at 08:37

## 2022-07-13 RX ADMIN — ALBUTEROL SULFATE 2.5 MG: 2.5 SOLUTION RESPIRATORY (INHALATION) at 07:38

## 2022-07-13 RX ADMIN — QUETIAPINE FUMARATE 12.5 MG: 25 TABLET ORAL at 21:05

## 2022-07-13 RX ADMIN — MAGNESIUM GLUCONATE 500 MG ORAL TABLET 400 MG: 500 TABLET ORAL at 08:37

## 2022-07-13 RX ADMIN — ATORVASTATIN CALCIUM 40 MG: 80 TABLET, FILM COATED ORAL at 08:37

## 2022-07-13 RX ADMIN — ALBUTEROL SULFATE 2.5 MG: 2.5 SOLUTION RESPIRATORY (INHALATION) at 02:46

## 2022-07-13 RX ADMIN — LEVOTHYROXINE SODIUM 25 MCG: 25 TABLET ORAL at 06:09

## 2022-07-13 RX ADMIN — AMIODARONE HYDROCHLORIDE 100 MG: 200 TABLET ORAL at 08:37

## 2022-07-13 ASSESSMENT — ENCOUNTER SYMPTOMS
PHOTOPHOBIA: 0
NAUSEA: 0
TROUBLE SWALLOWING: 0
VOICE CHANGE: 0
WHEEZING: 0
TROUBLE SWALLOWING: 1
BLOOD IN STOOL: 0
VOMITING: 0
EYE REDNESS: 0
COUGH: 1
SHORTNESS OF BREATH: 0
CONSTIPATION: 0
CHEST TIGHTNESS: 0
DIARRHEA: 0
SORE THROAT: 0
ABDOMINAL DISTENTION: 0
ABDOMINAL PAIN: 0

## 2022-07-13 ASSESSMENT — PAIN DESCRIPTION - LOCATION: LOCATION: SACRUM

## 2022-07-13 ASSESSMENT — PAIN SCALES - GENERAL: PAINLEVEL_OUTOF10: 3

## 2022-07-13 NOTE — CARE COORDINATION
Pt able to discharge to [address] [place] [address] today. Awaiting plan for transportation to and from hemodialysis. Pt working with PT now. Daughter is in room    1430 met with pt and pt's daughter. She does not feel comfortable transferring pt from car to wheelchair and from wheelchair to car as pt is very weak. She does not have additional help.  Discussed with Rabia BRANDT

## 2022-07-13 NOTE — PLAN OF CARE
Problem: Respiratory - Adult  Goal: Achieves optimal ventilation and oxygenation  7/13/2022 1645 by Gina Allan RCP  Outcome: Progressing  7/13/2022 1552 by Luis Preston RN  Outcome: Progressing  Flowsheets (Taken 7/13/2022 0800)  Achieves optimal ventilation and oxygenation:   Assess for changes in respiratory status   Assess for changes in mentation and behavior   Position to facilitate oxygenation and minimize respiratory effort   Oxygen supplementation based on oxygen saturation or arterial blood gases   BRONCHOSPASM/BRONCHOCONSTRICTION     [x]         IMPROVE AERATION/BREATH SOUNDS  [x]   ADMINISTER BRONCHODILATOR THERAPY AS APPROPRIATE  [x]   ASSESS BREATH SOUNDS  []   IMPLEMENT AEROSOL/MDI PROTOCOL  [x]   PATIENT EDUCATION AS NEEDED   PROVIDE ADEQUATE OXYGENATION WITH ACCEPTABLE SP02/ABG'S    [x]  IDENTIFY APPROPRIATE OXYGEN THERAPY  [x]   MONITOR SP02/ABG'S AS NEEDED   [x]   PATIENT EDUCATION AS NEEDED

## 2022-07-13 NOTE — PROGRESS NOTES
Subjective: patient evaluated . Pt received dialysis Monday  . No Access problems reported . No new issues overnite. Stable hemodynamics. It appears that she is probably going to end up with  dialysis spot as an outpatient.  working on getting her spot and transportation organized for that. Cussed with patient and patient's family in the room. We will hold dialysis today and get her back on the regular schedule of TTS    Objective:  CURRENT TEMPERATURE:  Temp: 98.1 °F (36.7 °C)  MAXIMUM TEMPERATURE OVER 24HRS:  Temp (24hrs), Av.5 °F (36.9 °C), Min:98.1 °F (36.7 °C), Max:98.9 °F (37.2 °C)    CURRENT RESPIRATORY RATE:  Resp: 18  CURRENT PULSE:  Heart Rate: 83  CURRENT BLOOD PRESSURE:  BP: (!) 146/58  24HR BLOOD PRESSURE RANGE:  Systolic (79BVG), MWN:267 , Min:124 , SXS:438   ; Diastolic (29UVW), RNU:43, Min:50, Max:58    24HR INTAKE/OUTPUT:  No intake or output data in the 24 hours ending 22 1016  Patient Vitals for the past 96 hrs (Last 3 readings):   Weight   22 1333 138 lb 14.2 oz (63 kg)   22 1005 144 lb 10 oz (65.6 kg)         Physical Exam:  General appearance:Awake, alert, in no acute distress  Skin: warm and dry, no rash or erythema  Eyes: conjunctivae normal and sclera anicteric  ENT:no thrush no pharyngeal congestion   Neck:  No JVD, No Thyromegaly  Pulmonary: Some basal crackles heard on auscultation   Cardiovascular: normal S1 and S2. NO rubs and NO murmur.  No S3 OR S4   Abdomen: soft nontender, bowel sounds present, no organomegaly,  no ascites   Extremities: + edema     Access:  previous permacath    Current Medications:    QUEtiapine (SEROQUEL) tablet 12.5 mg, Nightly  guaiFENesin (MUCINEX) extended release tablet 600 mg, BID  haloperidol lactate (HALDOL) injection 1 mg, Q6H PRN  heparin (porcine) injection 1,600 Units, PRN  heparin (porcine) injection 1,600 Units, PRN  polyethylene glycol (GLYCOLAX) packet 17 g, BID  hydrocortisone (ANUSOL-HC) suppository 25 mg, BID  pantoprazole (PROTONIX) tablet 40 mg, QAM AC  apixaban (ELIQUIS) tablet 5 mg, BID  lidocaine (XYLOCAINE) 2 % jelly, PRN  nitroglycerin (NITRO-BID) 2 % ointment 0.5 inch, BID PRN  bisacodyl (DULCOLAX) suppository 10 mg, Daily PRN  metoprolol tartrate (LOPRESSOR) tablet 25 mg, BID  epoetin rick-epbx (RETACRIT) injection 5,000 Units, Q MWF  0.9 % sodium chloride infusion, PRN  albumin human 25 % IV solution 25 g, PRN  levothyroxine (SYNTHROID) tablet 25 mcg, Daily  sodium chloride flush 0.9 % injection 5-40 mL, PRN  0.9 % sodium chloride infusion, PRN  0.9 % sodium chloride infusion, PRN  amiodarone (CORDARONE) tablet 100 mg, Daily  fentaNYL (SUBLIMAZE) injection 50 mcg, Q2H PRN  albuterol (PROVENTIL) nebulizer solution 2.5 mg, Q6H  glucose chewable tablet 16 g, PRN  dextrose bolus 10% 125 mL, PRN   Or  dextrose bolus 10% 250 mL, PRN  glucagon (rDNA) injection 1 mg, PRN  dextrose 5 % solution, PRN  sodium chloride flush 0.9 % injection 5-40 mL, PRN  0.9 % sodium chloride infusion, PRN  ondansetron (ZOFRAN-ODT) disintegrating tablet 4 mg, Q8H PRN   Or  ondansetron (ZOFRAN) injection 4 mg, Q6H PRN  acetaminophen (TYLENOL) tablet 650 mg, Q6H PRN   Or  acetaminophen (TYLENOL) suppository 650 mg, Q6H PRN  aspirin EC tablet 81 mg, Daily  atorvastatin (LIPITOR) tablet 40 mg, Daily  magnesium oxide (MAG-OX) tablet 400 mg, BID  miconazole (MICOTIN) 2 % powder, BID  ferrous sulfate (FE TABS 325) EC tablet 325 mg, Daily with breakfast  morphine (PF) injection 2 mg, Q4H PRN      Labs:   CBC:   Recent Labs     07/11/22  0626 07/12/22  0545 07/13/22  0829   WBC 8.9 9.8 9.2   RBC 2.79* 2.89* 2.89*   HGB 8.5* 8.9* 8.8*   HCT 27.6* 27.7* 28.9*    250 285   MPV 9.5 9.0 9.0      BMP:   Recent Labs     07/11/22  0626 07/12/22  0545 07/13/22  0829    137 137   K 3.9 3.6* 3.9   CL 99 103 104   CO2 25 26 25   BUN 31* 15 24*   CREATININE 2.81* 2.00* 2.83*   GLUCOSE 91 98 104*   CALCIUM 8.6 8.2* 8.5*        Phosphorus:    Recent Labs     07/13/22  0829   PHOS 3.1           Assessment:  1. Hemodialysis dependent acute kidney injury in the setting of ATN plus/minus progressive diabetic nephropathy with no sign of recovery at this point in time. Patient looking at outpatient schedule to be a Tuesday and Thursday and Saturday at [facility]. Skilled nursing facility placement pending. 2. Volume overload improving  3. Nephrotic range proteinuria secondary to diabetic nephropathy   4. Ventilator dependent acute respiratory failure earlier in the admission which has improved with the patient on low flow oxygen by nasal came  5. Hypotension requiring vasopressor support earlier in the admission, resolved  6. Wound care ongoing for nonhealing lower extremity ulcers in the setting of chronic cellulitic change:    Plan:  1. We will hold dialysis today, get her back on TTS schedule pending discharge. 2.   working on transportation etc. as outpatient. 3. Follow up labs ordered. 4.  Cussed with family and updated them accordingly. Please do not hesitate to call with questions.     Electronically signed by Fabiola Perkins MD on 7/13/2022 at 10:16 AM

## 2022-07-13 NOTE — PLAN OF CARE
Problem: Discharge Planning  Goal: Discharge to home or other facility with appropriate resources  Outcome: Progressing     Problem: Chronic Conditions and Co-morbidities  Goal: Patient's chronic conditions and co-morbidity symptoms are monitored and maintained or improved  Outcome: Progressing     Problem: Pain  Goal: Verbalizes/displays adequate comfort level or baseline comfort level  Outcome: Progressing     Problem: Skin/Tissue Integrity  Goal: Absence of new skin breakdown  Description: 1. Monitor for areas of redness and/or skin breakdown  2. Assess vascular access sites hourly  3. Every 4-6 hours minimum:  Change oxygen saturation probe site  4. Every 4-6 hours:  If on nasal continuous positive airway pressure, respiratory therapy assess nares and determine need for appliance change or resting period.   Outcome: Progressing     Problem: Safety - Adult  Goal: Free from fall injury  Outcome: Progressing     Problem: ABCDS Injury Assessment  Goal: Absence of physical injury  Outcome: Progressing     Problem: Respiratory - Adult  Goal: Achieves optimal ventilation and oxygenation  Outcome: Progressing     Problem: Nutrition Deficit:  Goal: Optimize nutritional status  7/12/2022 2102 by Zehra Ortiz RN  Outcome: Progressing  7/12/2022 1307 by Adelaida Patel RD, LD  Flowsheets (Taken 7/12/2022 1156)  Nutrient intake appropriate for improving, restoring, or maintaining nutritional needs:   Assess nutritional status and recommend course of action   Monitor oral intake, labs, and treatment plans   Recommend appropriate diets, oral nutritional supplements, and vitamin/mineral supplements

## 2022-07-13 NOTE — PLAN OF CARE
Problem: Discharge Planning  Goal: Discharge to home or other facility with appropriate resources  7/13/2022 1906 by Cheyenne Cash RN  Outcome: Progressing  Flowsheets (Taken 7/13/2022 0800)  Discharge to home or other facility with appropriate resources:   Identify barriers to discharge with patient and caregiver   Arrange for needed discharge resources and transportation as appropriate   Identify discharge learning needs (meds, wound care, etc)  7/13/2022 1552 by Cheyenne Cash RN  Outcome: Progressing  Flowsheets (Taken 7/13/2022 0800)  Discharge to home or other facility with appropriate resources:   Identify barriers to discharge with patient and caregiver   Arrange for needed discharge resources and transportation as appropriate   Identify discharge learning needs (meds, wound care, etc)     Problem: Chronic Conditions and Co-morbidities  Goal: Patient's chronic conditions and co-morbidity symptoms are monitored and maintained or improved  7/13/2022 1906 by Cheyenne Cash RN  Outcome: Progressing  Flowsheets (Taken 7/13/2022 0800)  Care Plan - Patient's Chronic Conditions and Co-Morbidity Symptoms are Monitored and Maintained or Improved:   Monitor and assess patient's chronic conditions and comorbid symptoms for stability, deterioration, or improvement   Collaborate with multidisciplinary team to address chronic and comorbid conditions and prevent exacerbation or deterioration   Update acute care plan with appropriate goals if chronic or comorbid symptoms are exacerbated and prevent overall improvement and discharge  7/13/2022 1552 by Cheyenne Cash RN  Outcome: Progressing  Flowsheets (Taken 7/13/2022 0800)  Care Plan - Patient's Chronic Conditions and Co-Morbidity Symptoms are Monitored and Maintained or Improved:   Monitor and assess patient's chronic conditions and comorbid symptoms for stability, deterioration, or improvement   Collaborate with multidisciplinary team to address chronic and comorbid conditions and prevent exacerbation or deterioration   Update acute care plan with appropriate goals if chronic or comorbid symptoms are exacerbated and prevent overall improvement and discharge     Problem: Pain  Goal: Verbalizes/displays adequate comfort level or baseline comfort level  7/13/2022 1906 by Shelia Warren RN  Outcome: Progressing  Flowsheets (Taken 7/13/2022 1552)  Verbalizes/displays adequate comfort level or baseline comfort level:   Encourage patient to monitor pain and request assistance   Assess pain using appropriate pain scale   Administer analgesics based on type and severity of pain and evaluate response   Implement non-pharmacological measures as appropriate and evaluate response  7/13/2022 1552 by Shelia Warren RN  Outcome: Progressing  Flowsheets (Taken 7/13/2022 1552)  Verbalizes/displays adequate comfort level or baseline comfort level:   Encourage patient to monitor pain and request assistance   Assess pain using appropriate pain scale   Administer analgesics based on type and severity of pain and evaluate response   Implement non-pharmacological measures as appropriate and evaluate response     Problem: Skin/Tissue Integrity  Goal: Absence of new skin breakdown  Description: 1. Monitor for areas of redness and/or skin breakdown  2. Assess vascular access sites hourly  3. Every 4-6 hours minimum:  Change oxygen saturation probe site  4. Every 4-6 hours:  If on nasal continuous positive airway pressure, respiratory therapy assess nares and determine need for appliance change or resting period.   7/13/2022 1906 by Shelia Warren RN  Outcome: Progressing  7/13/2022 1552 by Shelia Warren RN  Outcome: Progressing     Problem: Safety - Adult  Goal: Free from fall injury  7/13/2022 1906 by Shelai Warren RN  Outcome: Progressing  Flowsheets (Taken 7/13/2022 1543)  Free From Fall Injury: Instruct family/caregiver on patient safety  7/13/2022 1552 by Shelia Warren RN  Outcome: Progressing  Flowsheets (Taken 7/13/2022 1543)  Free From Fall Injury: Instruct family/caregiver on patient safety     Problem: ABCDS Injury Assessment  Goal: Absence of physical injury  7/13/2022 1906 by Va Perdomo RN  Outcome: Progressing  Flowsheets (Taken 7/13/2022 1543)  Absence of Physical Injury: Implement safety measures based on patient assessment  7/13/2022 1552 by Va Perdomo RN  Outcome: Progressing  Flowsheets (Taken 7/13/2022 1543)  Absence of Physical Injury: Implement safety measures based on patient assessment     Problem: Respiratory - Adult  Goal: Achieves optimal ventilation and oxygenation  7/13/2022 1906 by Va Perdomo RN  Outcome: Progressing  Flowsheets (Taken 7/13/2022 0800)  Achieves optimal ventilation and oxygenation:   Assess for changes in respiratory status   Assess for changes in mentation and behavior   Position to facilitate oxygenation and minimize respiratory effort   Oxygen supplementation based on oxygen saturation or arterial blood gases  7/13/2022 1645 by Larry De La Torre RCP  Outcome: Progressing  7/13/2022 1552 by Va Perdomo RN  Outcome: Progressing  Flowsheets (Taken 7/13/2022 0800)  Achieves optimal ventilation and oxygenation:   Assess for changes in respiratory status   Assess for changes in mentation and behavior   Position to facilitate oxygenation and minimize respiratory effort   Oxygen supplementation based on oxygen saturation or arterial blood gases     Problem: Nutrition Deficit:  Goal: Optimize nutritional status  7/13/2022 1906 by Va Perdomo RN  Outcome: Progressing  Flowsheets (Taken 7/13/2022 1552)  Nutrient intake appropriate for improving, restoring, or maintaining nutritional needs:   Monitor oral intake, labs, and treatment plans   Provide specific nutrition education to patient or family as appropriate  7/13/2022 1552 by Va Perdomo RN  Outcome: Progressing  Flowsheets (Taken 7/13/2022 1552)  Nutrient intake appropriate for improving, restoring, or maintaining nutritional needs:   Monitor oral intake, labs, and treatment plans   Provide specific nutrition education to patient or family as appropriate

## 2022-07-13 NOTE — PROGRESS NOTES
Physical Therapy  Facility/Department: [facility]  Daily Treatment Note     Name: Dane Weaver  :   MRN: [id number]  Date of Service: 2022    Discharge Recommendations:  Patient would benefit from continued therapy after discharge   PT Equipment Recommendations  Equipment Needed: No  Other: Pt unsafe to amb any distance without skilled assistance. Patient Diagnosis(es): The encounter diagnosis was Bradycardia.   Past Medical History:  has a past medical history of Acute CVA (cerebrovascular accident) ([address] Acute kidney injury superimposed on chronic kidney disease ([address] Acute on chronic combined systolic (congestive) and diastolic (congestive) heart failure ([address] JOY (acute kidney injury) ([address] Altered mental status, Anticoagulated, Arthritis, Asthma, Bradycardia, Brain mass, Cellulitis and abscess, Cellulitis and abscess of unspecified site, Cellulitis of both lower extremities, Cellulitis of left lower extremity, Cellulitis of lower extremity, CHF (congestive heart failure) ([address] Chronic kidney disease, unspecified, CKD stage 4 secondary to hypertension ([address] Coronary atherosclerosis of native coronary artery, Elevated LFTs, Essential hypertension, Essential hypertension, malignant, Hallucinations, Hard of hearing, Head contusion, Hypertensive emergency, Hypertensive urgency with joy, Hypokalemia, Hypomagnesemia, Iron deficiency anemia, unspecified, Meningioma ([address] Myocardial infarction ([address] Other and unspecified hyperlipidemia, Pure hypercholesterolemia, Stage 4 chronic kidney disease ([address] Toxic metabolic encephalopathy, Type 2 diabetes mellitus with stage 4 chronic kidney disease, without long-term current use of insulin ([address] Type II or unspecified type diabetes mellitus without mention of complication, not stated as uncontrolled, Unspecified asthma(493.90), Unspecified venous (peripheral) insufficiency, Unspecified vitamin D deficiency, and UTI (urinary tract infection). Past Surgical History:  has a past surgical history that includes Tonsillectomy and adenoidectomy; Coronary artery bypass graft; intubation (3/7/2022); Thoracentesis (3/10/2022); Upper gastrointestinal endoscopy (N/A, 3/15/2022); Colonoscopy (N/A, 3/15/2022); IR NONTUNNELED VASCULAR CATHETER > 5 YEARS (6/28/2022); and IR TUNNELED CVC PLACE WO SQ PORT/PUMP > 5 YEARS (7/8/2022). Assessment   Body Structures, Functions, Activity Limitations Requiring Skilled Therapeutic Intervention: Decreased functional mobility ; Decreased strength;Decreased endurance;Decreased balance  Assessment: Pt required modA for bed mobility along with increased time and effort to complete. Pt sat EOB x 20 minutes with CGA to maintain upright posture due to excessive fwd flexed posture. Pt performed STS transfer x2 with min-modA. First STS transfer was completed with Mehdi using the SS and second attempt was completed with modA using a RW. Pt demos a standing tolerance of 1 min on each attempt. Pt requires frequent verbal cues for sequencing t/o session. Pt is limited by decreased balance, decreased BLE strength, and decreased endurance. Pt is currently unsafe to return to prior living arrangements due to high fall risk and would benefit from continued PT following discharge to address functional deficits. Therapy Prognosis: Good  Decision Making: High Complexity  Requires PT Follow-Up: Yes  Activity Tolerance  Activity Tolerance: Patient limited by fatigue;Patient limited by endurance  Activity Tolerance Comments: c/o fear of falling, pt reassured throughout.      Plan   Plan  Plan:  (6x/wk)  Current Treatment Recommendations: Strengthening,Balance training,Functional mobility training,Transfer training,Endurance training,Gait training,Stair training,Home exercise program,Safety education & training,Patient/Caregiver education & training,Equipment evaluation, education, & procurement  Safety Devices  Type of Devices: Call light within reach,Nurse notified,Gait belt,Patient at risk for falls,Heels elevated for pressure relief,Bed alarm in place,Left in bed  Restraints  Restraints Initially in Place: No     Restrictions  Restrictions/Precautions  Restrictions/Precautions: Fall Risk,Up as Tolerated  Required Braces or Orthoses?: No  Position Activity Restriction  Other position/activity restrictions: 6/28 emergent intubation and non-tunnelled HD catheter. extubated 7/6 at [address]  Chart Reviewed: Yes  Response To Previous Treatment: Patient with no complaints from previous session. Family / Caregiver Present: No  Follows Commands: Within Functional Limits  General Comment  Comments: Pt retired to supine with call light. RN notified. Bed alarm  Subjective  Subjective: RN and pt agreeable to PT. Pt supine upon arrival.  Pt pleasant and cooperative t/o session. Pt has no c/o pain. Cognition   Orientation  Overall Orientation Status: Impaired  Orientation Level: Oriented to place;Oriented to person;Disoriented to situation  Cognition  Overall Cognitive Status: Exceptions  Following Commands: Follows one step commands consistently  Attention Span: Attends with cues to redirect  Cognition Comment: Pt requires increased time     Objective   Bed mobility  Supine to Sit: Moderate assistance  Sit to Supine: Maximum assistance  Scooting: Moderate assistance  Bed Mobility Comments: HOB elevated to complete with increased time and effort. Transfers  Sit to Stand: Minimal Assistance; Moderate Assistance  Stand to sit: Minimal Assistance        Balance  Posture: Poor  Sitting - Static: Fair;+  Sitting - Dynamic: Fair  Standing - Static: Poor  Standing - Dynamic: Poor  Comments: Assessed with RW in standing  A/AROM Exercises: Ankle pumps x20 BLE, SLR x10 BLE, LAQs x10 BLE  Dynamic Sitting Balance Exercises: pt sat EOB x 20 minutes with CGA to maintain with excessive fwd flexed posture.   B LE seated therapeutic exercises which included heel/toe raises and LAQ x 10 reps.                             AM-PAC Score  AM-PAC Inpatient Mobility Raw Score : 9 (07/13/22 1355)  AM-PAC Inpatient T-Scale Score : 30.55 (07/13/22 1355)  Mobility Inpatient CMS 0-100% Score: 81.38 (07/13/22 1355)  Mobility Inpatient CMS G-Code Modifier : CM (07/13/22 1355)          Goals  Short Term Goals  Time Frame for Short term goals: 14 visits  Short term goal 1: Pt will be CGA mobility  Short term goal 2: Pt will be CGA transfers  Short term goal 3: Pt will be Mehdi amb 50' RW  Short term goal 4: Pt will navigate 4 steps Mehdi TANNER rail         Therapy Time   Individual Concurrent Group Co-treatment   Time In 1100         Time Out 1147         Minutes 47         Timed Code Treatment Minutes: 31 Korina Aguilera, PTA

## 2022-07-13 NOTE — PLAN OF CARE
Problem: Discharge Planning  Goal: Discharge to home or other facility with appropriate resources  Outcome: Progressing  Flowsheets (Taken 7/13/2022 0800)  Discharge to home or other facility with appropriate resources:   Identify barriers to discharge with patient and caregiver   Arrange for needed discharge resources and transportation as appropriate   Identify discharge learning needs (meds, wound care, etc)     Problem: Chronic Conditions and Co-morbidities  Goal: Patient's chronic conditions and co-morbidity symptoms are monitored and maintained or improved  Outcome: Progressing  Flowsheets (Taken 7/13/2022 0800)  Care Plan - Patient's Chronic Conditions and Co-Morbidity Symptoms are Monitored and Maintained or Improved:   Monitor and assess patient's chronic conditions and comorbid symptoms for stability, deterioration, or improvement   Collaborate with multidisciplinary team to address chronic and comorbid conditions and prevent exacerbation or deterioration   Update acute care plan with appropriate goals if chronic or comorbid symptoms are exacerbated and prevent overall improvement and discharge     Problem: Pain  Goal: Verbalizes/displays adequate comfort level or baseline comfort level  Outcome: Progressing  Flowsheets (Taken 7/13/2022 1552)  Verbalizes/displays adequate comfort level or baseline comfort level:   Encourage patient to monitor pain and request assistance   Assess pain using appropriate pain scale   Administer analgesics based on type and severity of pain and evaluate response   Implement non-pharmacological measures as appropriate and evaluate response     Problem: Skin/Tissue Integrity  Goal: Absence of new skin breakdown  Description: 1. Monitor for areas of redness and/or skin breakdown  2. Assess vascular access sites hourly  3. Every 4-6 hours minimum:  Change oxygen saturation probe site  4.   Every 4-6 hours:  If on nasal continuous positive airway pressure, respiratory therapy assess nares and determine need for appliance change or resting period.   Outcome: Progressing     Problem: Safety - Adult  Goal: Free from fall injury  Outcome: Progressing  Flowsheets (Taken 7/13/2022 1543)  Free From Fall Injury: Instruct family/caregiver on patient safety     Problem: ABCDS Injury Assessment  Goal: Absence of physical injury  Outcome: Progressing  Flowsheets (Taken 7/13/2022 1543)  Absence of Physical Injury: Implement safety measures based on patient assessment     Problem: Respiratory - Adult  Goal: Achieves optimal ventilation and oxygenation  Outcome: Progressing  Flowsheets (Taken 7/13/2022 0800)  Achieves optimal ventilation and oxygenation:   Assess for changes in respiratory status   Assess for changes in mentation and behavior   Position to facilitate oxygenation and minimize respiratory effort   Oxygen supplementation based on oxygen saturation or arterial blood gases     Problem: Nutrition Deficit:  Goal: Optimize nutritional status  Outcome: Progressing  Flowsheets (Taken 7/13/2022 1552)  Nutrient intake appropriate for improving, restoring, or maintaining nutritional needs:   Monitor oral intake, labs, and treatment plans   Provide specific nutrition education to patient or family as appropriate

## 2022-07-13 NOTE — PROGRESS NOTES
Per pt's daughter, pt barely ate all day. For breakfast, she had one frozen yogurt cup. For lunch, she ate half a peach, bite of chicken, one slice of cooked carrot. Primary team informed.

## 2022-07-13 NOTE — CARE COORDINATION
Called [facility] to see if they would be able to assist dtr getting pt in and out of the car when pt gets to dialysis . Advised that the staff is not able to assist outside of the building. May need to see if dtr can bring someone with her to get pt in/out of the car. CM updated. 1040 - Addendum  Discussed transportation issues with CM. Called [facility] to see if they would transport to/from dialysis with pending [place]. Spoke with Coco Job - info provided and she stated it would be private pt from pt or facility. [address] to see if they have any transportation benefits. Spoke with Escobar Corbin - she stated the only transportation benefit covered is for an EMS run. There is no ambulette transportation coverage    1500 - Addendum  Met with dtr after pt worked with PT and discussed with CM. Dtr does not feel comfortable transporting pt alone from car to dialysis unit and especially from unit to car after dialysis, as pt more weak. Dtr stated she does not have anyone that can come with her to assist.  PT note states \"pt unsafe to amb any distance without skilled assistance\".   SW will cont to try to explore options

## 2022-07-13 NOTE — PROGRESS NOTES
[facility]  Office: [address], DO, Cammy Magdalena, DO, Daphneanders Evans, DO, Farida Mir Blood, DO, Rocio Ludwig MD, Azar Johnson MD, Rosetta Carvajal MD, Julius Martinez MD, June Garrison MD, Brandon Richardson MD, Eusebio Matthews MD, Alisa Rangel, DO, Madhu Rico MD,  Alison Sanchez DO, Radha Morrison MD, Fernanda Naranjo MD, Yohan Brooks, DO, Jann Sales MD, Dilip Burnham MD, Albertina Glass, DO, Wendy Gilbert MD, Dietrich Halsted, MD, Cesar Vick, [facility], [facility] Mar[facility], [facility], Charu Flores, CNP, Ron Adam, CNP, Deonte Gutierrez, CNP, Nancie Simmons, CNP, Blake Kerr, PA-C, Benny Blount, [facility], [place], CNP, Deann Mann, CNP, Rosi Stinson, CNP, Jose Teague, CNS, Bill Maciel, [facility], Susie Herbert, CNP, Johana Muller, [facility], Hien Toscano, [address] [place] [address]    Progress Note    7/13/2022    2:36 PM    Name:   Nat Alejandre  MRN:     [id number]     Acct:      [de-identified]   Room:   2019/2019-01  IP Day:  12  Admit Date:  6/27/2022 12:28 PM    PCP:   Barbara Bowling MD  Code Status:  Full Code    Subjective:     C/C:   Chief Complaint   Patient presents with    Bradycardia     Interval History Status:  improving    Patient complaining of copious secretions and headache. Given Tylenol by nursing. Patient has found placement for hemodialysis, plan to discharge when placement is found. She did not sleep last night, discussed giving Seroquel for hospital delirium    Brief History:     19-year-old with prior history of CKD stage IV( baseline creatinine 1.8-2), chronic diastolic CHF, hypertension, CAD s/p CABG in 2003 and occluded SVG to OM 2 and cath in 2006, COPD, type 2 diabetes, A. fib on amiodarone and Eliquis  Presented to ED on 6/27/2021 with complaints of shortness of breath hypoxia improved on 90s, and fatigue.   Was found to be bradycardic heart rate in 35 by EMS and was placed on transcutaneous pacing and received atropine. She was subsequently started on epinephrine drip and admitted to the ICU. X-ray consistent with pulmonary edema. Cardiology was consulted and switched to dopamine drip, and Eliquis was switched to heparin WBP. Was also noted to be hyperkalemic and started on hemodialysis sec to worsening CKD on 06/28/22. Neurology was consulted for encephalopathy : noted to have right anterior temporal fossa meningioma with possible edema. MRI considered nonemergent and deferred.,   ID was consulted for leukocytosis and b/l LE wounds which are chronic(sees wound care clinic outpatient) and Right gluteal /coccygeal ulcerations. Started on Cefepime.   -On 6/28/2022 she had progressively worsening mentation with worsening hypoxia and hypercapnia she was intubated and started on HD. Heparin was held on 6/29-07/01 secondary to oozing at right IJ dialysis catheter site. Patient was continued on empiric antibiotics through 7/1/22(urine and blood cx NG), dialysis/UF and subsequently extubated on 07/06/22. Received 1u PRBC 7/7/22 for hb 7.0  Today she is on 2l NC O2, K 3.9, bicarb 27, Hb 8.4, lethargic, arousable but falling asleep easily, appears tired. Review of Systems:     Review of Systems   Constitutional: Positive for activity change and appetite change. Negative for chills, diaphoresis and fever. HENT: Positive for trouble swallowing. Negative for congestion. Eyes: Negative for visual disturbance. Respiratory: Positive for cough. Negative for chest tightness, shortness of breath and wheezing. Cardiovascular: Negative for chest pain, palpitations and leg swelling. Gastrointestinal: Negative for abdominal pain, blood in stool, constipation, diarrhea, nausea and vomiting. Genitourinary: Negative for difficulty urinating. Neurological: Negative for dizziness, weakness, light-headedness, numbness and headaches.    Psychiatric/Behavioral: Positive for confusion (resolving). All other systems reviewed and are negative. Medications: Allergies:     Allergies   Allergen Reactions    Latex Rash    Aleve [Naproxen Sodium]      Chronic kidney disease stage III, CHF    Pioglitazone Other (See Comments)     Congestive heart failure    Claritin [Loratadine]     Keflex [Cephalexin]     Lisinopril      Needs clarification of contraindication       Current Meds:   Scheduled Meds:    QUEtiapine  12.5 mg Oral Nightly    guaiFENesin  600 mg Oral BID    polyethylene glycol  17 g Oral BID    hydrocortisone  25 mg Rectal BID    pantoprazole  40 mg Oral QAM AC    apixaban  5 mg Oral BID    metoprolol tartrate  25 mg Oral BID    epoetin rick-epbx  5,000 Units IntraVENous Q MWF    levothyroxine  25 mcg Oral Daily    amiodarone  100 mg Oral Daily    albuterol  2.5 mg Nebulization Q6H    aspirin EC  81 mg Oral Daily    atorvastatin  40 mg Oral Daily    magnesium oxide  400 mg Oral BID    miconazole   Topical BID    ferrous sulfate  325 mg Oral Daily with breakfast     Continuous Infusions:    sodium chloride      sodium chloride      sodium chloride      dextrose      sodium chloride 75 mL/hr at 07/06/22 2000     PRN Meds: haloperidol lactate, heparin (porcine), heparin (porcine), lidocaine, nitroglycerin, bisacodyl, sodium chloride, albumin human, sodium chloride flush, sodium chloride, sodium chloride, fentanNYL, glucose, dextrose bolus **OR** dextrose bolus, glucagon (rDNA), dextrose, sodium chloride flush, sodium chloride, ondansetron **OR** ondansetron, acetaminophen **OR** acetaminophen, morphine    Data:     Past Medical History:   has a past medical history of Acute CVA (cerebrovascular accident) ([facility] [address] Acute kidney injury superimposed on chronic kidney disease ([facility] [address] Acute on chronic combined systolic (congestive) and diastolic (congestive) heart failure ([address] JOY (acute kidney injury) ([facility] [address] Altered mental status, Anticoagulated, Arthritis, Asthma, Bradycardia, Brain mass, Cellulitis and abscess, Cellulitis and abscess of unspecified site, Cellulitis of both lower extremities, Cellulitis of left lower extremity, Cellulitis of lower extremity, CHF (congestive heart failure) (HCC), Chronic kidney disease, unspecified, CKD stage 4 secondary to hypertension ([facility] [address] Coronary atherosclerosis of native coronary artery, Elevated LFTs, Essential hypertension, Essential hypertension, malignant, Hallucinations, Hard of hearing, Head contusion, Hypertensive emergency, Hypertensive urgency with nyla, Hypokalemia, Hypomagnesemia, Iron deficiency anemia, unspecified, Meningioma ([facility] [address] Myocardial infarction ([facility] [address] Other and unspecified hyperlipidemia, Pure hypercholesterolemia, Stage 4 chronic kidney disease ([facility] [address] Toxic metabolic encephalopathy, Type 2 diabetes mellitus with stage 4 chronic kidney disease, without long-term current use of insulin ([facility] [address] Type II or unspecified type diabetes mellitus without mention of complication, not stated as uncontrolled, Unspecified asthma(493.90), Unspecified venous (peripheral) insufficiency, Unspecified vitamin D deficiency, and UTI (urinary tract infection). Social History:   reports that she quit smoking about 22 years ago. She has a 2.50 pack-year smoking history. She has never used smokeless tobacco. She reports previous alcohol use. She reports that she does not use drugs.      Family History:   Family History   Problem Relation Age of Onset    Diabetes Mother     Heart Disease Mother     Heart Disease Father     Diabetes Sister     High Blood Pressure Sister     Diabetes Maternal Grandmother        Vitals:  /63   Pulse 74   Temp 97.8 °F (36.6 °C) (Oral)   Resp 20   Ht 4' 11\" (1.499 m)   Wt 138 lb 14.2 oz (63 kg)   SpO2 100%   BMI 28.05 kg/m²   Temp (24hrs), Av.3 °F (36.8 °C), Min:97.8 °F (36.6 °C), Max:98.6 °F (37 °C)    Recent Labs     22  1515 227 22  0736 22  1053   POCGLU 75 135* 109* 120*       I/O (24Hr): No intake or output data in the 24 hours ending 07/13/22 1436    Labs:  Hematology:  Recent Labs     07/11/22 0626 07/12/22  0545 07/13/22  0829   WBC 8.9 9.8 9.2   RBC 2.79* 2.89* 2.89*   HGB 8.5* 8.9* 8.8*   HCT 27.6* 27.7* 28.9*   MCV 98.9 95.8 100.0   MCH 30.5 30.8 30.4   MCHC 30.8 32.1 30.4   RDW 16.7* 16.4* 16.7*    250 285   MPV 9.5 9.0 9.0     Chemistry:  Recent Labs     07/11/22  0626 07/12/22  0545 07/13/22  0829    137 137   K 3.9 3.6* 3.9   CL 99 103 104   CO2 25 26 25   GLUCOSE 91 98 104*   BUN 31* 15 24*   CREATININE 2.81* 2.00* 2.83*   ANIONGAP 13 8* 8*   LABGLOM 17* 24* 16*   GFRAA 20* 30* 20*   CALCIUM 8.6 8.2* 8.5*   PHOS  --   --  3.1     Recent Labs     07/12/22  0754 07/12/22  1050 07/12/22  1515 07/12/22 2057 07/13/22  0736 07/13/22  1053   POCGLU 90 77 75 135* 109* 120*     ABG:  Lab Results   Component Value Date/Time    POCPH 7.411 07/06/2022 04:40 AM    PHART 7.459 03/09/2022 04:28 AM    POCPCO2 44.8 07/06/2022 04:40 AM    OYH0PSZ 39.9 03/09/2022 04:28 AM    POCPO2 92.6 07/06/2022 04:40 AM    PO2ART 107.0 03/09/2022 04:28 AM    POCHCO3 28.5 07/06/2022 04:40 AM    IXA7FHW 28.3 03/09/2022 04:28 AM    NBEA 5 06/28/2022 05:36 PM    PBEA 4 07/06/2022 04:40 AM    ZNFM9ANX 97 07/06/2022 04:40 AM    E4DBCTDS 97.4 03/09/2022 04:28 AM    FIO2 30.0 07/06/2022 04:40 AM     Lab Results   Component Value Date/Time    SPECIAL NOT GIVEN 06/27/2022 01:11 PM     Lab Results   Component Value Date/Time    CULTURE NO GROWTH 4 DAYS 07/09/2022 05:12 AM    CULTURE NO GROWTH 4 DAYS 07/09/2022 05:12 AM       Radiology:  XR CHEST PORTABLE    Result Date: 7/3/2022  Mild increased perihilar edema, mild increased perihilar opacification, likely edema and CHF. Persistent small effusions and bibasilar atelectasis. Satisfactory position of endotracheal tube.      IR TUNNELED CVC PLACE WO SQ PORT/PUMP > 5 YEARS    Result Date: 7/8/2022  Successful ultrasound and fluoroscopy guided tunneled catheter placement . Okay to use. Physical Examination:        Physical Exam  Vitals and nursing note reviewed. Constitutional:       General: She is not in acute distress. Interventions: Nasal cannula in place. HENT:      Head: Normocephalic and atraumatic. Eyes:      Conjunctiva/sclera: Conjunctivae normal.      Pupils: Pupils are equal, round, and reactive to light. Cardiovascular:      Rate and Rhythm: Normal rate and regular rhythm. Heart sounds: No murmur heard. Pulmonary:      Effort: Pulmonary effort is normal. No accessory muscle usage or respiratory distress. Breath sounds: Transmitted upper airway sounds present. No stridor. Rhonchi and rales present. No decreased breath sounds or wheezing. Chest:      Comments: Tunneled catheter noted   Abdominal:      General: Bowel sounds are normal. There is no distension. Palpations: Abdomen is soft. Abdomen is not rigid. Tenderness: There is no abdominal tenderness. There is no guarding. Musculoskeletal:         General: No tenderness. Skin:     General: Skin is warm and dry. Findings: No erythema, lesion or rash. Neurological:      Mental Status: She is alert. She is disoriented. Cranial Nerves: No cranial nerve deficit. Motor: No seizure activity. Psychiatric:         Speech: Speech normal.         Behavior: Behavior normal. Behavior is cooperative.          Assessment:        Hospital Problems           Last Modified POA    * (Principal) Toxic metabolic encephalopathy 5/42/1294 Yes    Lymphedema 6/28/2022 Yes    Acute respiratory failure with hypoxia and hypercapnia ([address] 6/28/2022 Yes    Pulmonary edema cardiac cause ([address] 6/28/2022 Yes    Dependence on renal dialysis ([address] 7/5/2022 Yes    Nephrotic range proteinuria 7/5/2022 Yes    External hemorrhoid 7/10/2022 Yes    Dysphagia 7/10/2022 Yes    Debility 7/12/2022 Yes    Sepsis ([address] 7/12/2022 Yes    Acute on chronic diastolic congestive heart failure ([facility] [address] 6/28/2022 Yes                Plan:        1. Sinus bradycardia-from amiodarone and Lopressor, restarted by cardiology, they have since signed off. 2. Hospital delirium-patient not sleeping at night, confused this morning, startedSeroquel 12.5 mg at night, patient refused last night  3. Acute respiratory failure with hypoxia- wean oxygen as tolerated  4. End-stage renal disease on hemodialysis- still attempting to set up dialysis  5. Hypothyroidism-continue Synthroid 25 mcg  6. Coronary artery disease with CABG- last heart cath 2006  7. Paroxysmal atrial fibrillation-continue Eliquis, rate controlled with amiodarone and Lopressor  8. Plan:  1.  Waiting dialysis placement, continue Guy Sandoval DO  7/13/2022  2:36 PM

## 2022-07-13 NOTE — PROGRESS NOTES
PULMONARY & CRITICAL CARE MEDICINE PROGRESS NOTE     Patient:  Kishore Ortiz  MRN: [id number]  Admit date: 6/27/2022  Primary Care Physician: Angy Mckenna MD  Consulting Physician: Crista Holden DO  CODE Status: Full Code  LOS: 12     SUBJECTIVE     Chief Complaint/ Reason for consult:    Admitted to ICU with bradycardia/acute respiratory failure    Hospital Course: The patient is a 66 y.o. female with prior history of of CKD stage IV( baseline creatinine 1.8-2), chronic diastolic CHF, hypertension, CAD s/p CABG in  in 2003 and occluded SVG to OM 2 and cath in 2006, COPD, type 2 diabetes, A. fib on amiodarone and Eliquis  Presented to ED on 6/27/2021 with complaints of shortness of breath hypoxia improved on 90s, and fatigue. Was found to be bradycardic heart rate in 35 by EMS and was placed on transcutaneous pacing and received atropine. She was subsequently started on epinephrine drip and admitted to the ICU. X-ray consistent with pulmonary edema. Cardiology was consulted and switched to dopamine drip, and Eliquis was switched to heparin drip. Was also noted to be hyperkalemic and started on hemodialysis sec to worsening CKD on 06/28/22. Neurology was consulted for encephalopathy : noted to have right anterior temporal fossa meningioma with possible edema. MRI considered nonemergent and deferred.,  ID was consulted for leukocytosis and b/l LE wounds which are chronic(sees wound care clinic outpatient) and Right gluteal /coccygeal ulcerations. Started on Cefepime. -On 6/28/2022 she had progressively worsening mentation with worsening hypoxia and hypercapnia she was intubated and started on HD. Heparin was held on 6/29-07/01 secondary to oozing at right IJ dialysis catheter site. Patient was continued on empiric antibiotics through 7/1/22(urine and blood cx NG), dialysis/UF and subsequently extubated on 07/06/22. Received 1u PRBC 7/7/22 for hb 7.0. transferred to floor on 7/8/22.      Interval History:  22  Seen and examined   No acute events   On 2L nc  Alert and oriented now  Possible dialysis today      Review Of Systems:  Review of Systems   Constitutional: Positive for activity change. Negative for chills and fever. HENT: Negative for postnasal drip, sore throat, trouble swallowing and voice change. Eyes: Negative for photophobia, redness and visual disturbance. Respiratory: Positive for cough. Negative for shortness of breath and wheezing. Cardiovascular: Negative for chest pain, palpitations and leg swelling. Gastrointestinal: Negative for abdominal distention, abdominal pain, constipation, diarrhea and vomiting. Endocrine: Negative for polydipsia, polyphagia and polyuria. Neurological: Negative for dizziness, syncope, speech difficulty and numbness. OBJECTIVE     PaO2/FiO2 RATIO:  No results for input(s): POCPO2 in the last 72 hours. FiO2 : 28 %     VITAL SIGNS:   LAST:  BP (!) 146/58   Pulse 83   Temp 98.1 °F (36.7 °C) (Oral)   Resp 18   Ht 4' 11\" (1.499 m)   Wt 138 lb 14.2 oz (63 kg)   SpO2 100%   BMI 28.05 kg/m²   8-24 HR RANGE:  TEMP Temp  Av.5 °F (36.9 °C)  Min: 98.1 °F (36.7 °C)  Max: 98.9 °F (51.1 °C)   BP Systolic (52RKL), LNU:982 , Min:124 , KUB:533      Diastolic (12QAS), CNV:37, Min:50, Max:58     PULSE Pulse  Av.8  Min: 71  Max: 88   RR Resp  Av.5  Min: 18  Max: 24   O2 SAT SpO2  Av.7 %  Min: 96 %  Max: 100 %   OXYGEN DELIVERY O2 Flow Rate (L/min)  Avg: 3 L/min  Min: 3 L/min  Max: 3 L/min        Systemic Examination:   Physical Exam -  Constitutional: Alert and awake look comfortable no distress   Mental Status:  Oriented to person, place and time and normal affect. Lungs:  Bilateral air entry present, lung fields clear. Normal effort. Slightly decreased at bases mild upper airway sound present  Heart: Irregularly irregular rhythm, no murmur. Abdomen:  Soft, nontender, nondistended, normal bowel sounds.   Extremities: Positive edema of upper extremities, Ace wrap present in both lower extremities. Skin:  Warm, dry, no gross lesions or rashes. DATA REVIEW     Medications: Current Inpatient  Scheduled Meds:   QUEtiapine  12.5 mg Oral Nightly    guaiFENesin  600 mg Oral BID    polyethylene glycol  17 g Oral BID    hydrocortisone  25 mg Rectal BID    pantoprazole  40 mg Oral QAM AC    apixaban  5 mg Oral BID    metoprolol tartrate  25 mg Oral BID    epoetin rick-epbx  5,000 Units IntraVENous Q MWF    levothyroxine  25 mcg Oral Daily    amiodarone  100 mg Oral Daily    albuterol  2.5 mg Nebulization Q6H    aspirin EC  81 mg Oral Daily    atorvastatin  40 mg Oral Daily    magnesium oxide  400 mg Oral BID    miconazole   Topical BID    ferrous sulfate  325 mg Oral Daily with breakfast     Continuous Infusions:   sodium chloride      sodium chloride      sodium chloride      dextrose      sodium chloride 75 mL/hr at 07/06/22 2000       INPUT/OUTPUT:  No intake/output data recorded. LABS:  ABGs:   No results for input(s): POCPH, POCPCO2, POCPO2, POCHCO3, IRQP2AEG in the last 72 hours. CBC:   Recent Labs     07/11/22 0626 07/12/22  0545 07/13/22  0829   WBC 8.9 9.8 9.2   HGB 8.5* 8.9* 8.8*   HCT 27.6* 27.7* 28.9*   MCV 98.9 95.8 100.0    250 285   LYMPHOPCT 11* 10* 12*   RBC 2.79* 2.89* 2.89*   MCH 30.5 30.8 30.4   MCHC 30.8 32.1 30.4   RDW 16.7* 16.4* 16.7*     CRP:   No results for input(s): CRP in the last 72 hours. LDH:   No results for input(s): LDH in the last 72 hours. BMP:   Recent Labs     07/11/22  0626 07/12/22  0545 07/13/22  0829    137 137   K 3.9 3.6* 3.9   CL 99 103 104   CO2 25 26 25   BUN 31* 15 24*   CREATININE 2.81* 2.00* 2.83*   GLUCOSE 91 98 104*   PHOS  --   --  3.1     Liver Function Test:   No results for input(s): PROT, LABALBU, ALT, AST, GGT, ALKPHOS, BILITOT in the last 72 hours.   Coagulation Profile:   No results for input(s): INR, PROTIME, APTT in the last 72 hours.  D-Dimer:  No results for input(s): DDIMER in the last 72 hours. Lactic Acid:  No results for input(s): LACTA in the last 72 hours. Cardiac Enzymes:  No results for input(s): CKTOTAL, CKMB, CKMBINDEX, TROPONINI in the last 72 hours. Invalid input(s): TROPONIN, HSTROP  BNP/ProBNP:   No results for input(s): BNP, PROBNP in the last 72 hours. Triglycerides:  No results for input(s): TRIG in the last 72 hours. Microbiology:  Urine Culture:  No components found for: CURINE  Blood Culture:  No components found for: CBLOOD, CFUNGUSBL  Sputum Culture:  No components found for: CSPUTUM  No results for input(s): SPECDESC, SPECIAL, CULTURE, STATUS, ORG, CDIFFTOXPCR, CAMPYLOBPCR, SALMONELLAPC, SHIGAPCR, SHIGELLAPCR, MPNEUG, MPNEUM, LACTOQL in the last 72 hours. No results for input(s): SPUTUM, SPECDESC, SPECIAL, CULTURE, STATUS, ORG, CDIFFTOXPCR, MPNEUM, MPNEUG in the last 72 hours. Invalid input(s): CURINE, CBLOOD, CFUNGUSBL     Pathology:    Radiology Reports:  XR CHEST PORTABLE   Final Result   Findings are compatible with pulmonary edema. Bilateral pleural effusions. FL MODIFIED BARIUM SWALLOW W VIDEO   Final Result   1. Trace flash penetration without aspiration with the thin liquid substance   by straw. 2. No penetration or aspiration with the remainder of the administered   substances. Please see separate speech pathology report for full discussion of findings   and recommendations. IR TUNNELED CVC PLACE WO SQ PORT/PUMP > 5 YEARS   Final Result   Successful ultrasound and fluoroscopy guided tunneled catheter placement . Okay to use. XR CHEST PORTABLE   Final Result   Mild increased perihilar edema, mild increased perihilar opacification,   likely edema and CHF. Persistent small effusions and bibasilar atelectasis. Satisfactory position of endotracheal tube.          XR CHEST PORTABLE   Final Result   Stable support lines      Slight progression in now moderate diffuse bilateral interstitial infiltrates   related to increasing edema and/or infection with moderate bilateral pleural   effusions         XR CHEST PORTABLE   Final Result   Scattered infiltrates with bilateral effusions. Support tubes as described above. US RENAL COMPLETE   Final Result   Chronic renal disease. Small renal cysts. 1.4 cm right renal stone. No   hydronephrosis. IR NONTUNNELED VASCULAR CATHETER > 5 YEARS   Final Result   Successful ultrasound and fluoroscopy guided 13.5 Italian by 15 cm non   tunneled hemodialysis catheter placement. Ready for use. XR CHEST PORTABLE   Final Result   1. Endotracheal tube with the tip just entering the right mainstem bronchus. Recommend retraction by 4 cm. 2.  Right lower lobe airspace consolidation and small bilateral pleural   effusions. These findings were discussed with the inpatient nurse Lorraine Garcia at 1637   hours on 28 June 2022         XR ABDOMEN FOR NG/OG/NE TUBE PLACEMENT   Final Result   NG tube position, as detailed         XR CHEST PORTABLE   Final Result   Slight improvement in still moderate diffuse bilateral interstitial   infiltrates related to improving pulmonary edema with decreasing still   moderate right and mild to moderate left pleural effusions         CT HEAD WO CONTRAST   Final Result   No acute intracranial hemorrhage or hydrocephalus. Background mild chronic   small vessel white matter changes with remote lacunar insults in the right   caudate and left basal ganglia, unchanged from prior. 2.6 cm anterior right temporal meningioma with suggested mild increase in the   vasogenic edema along the anterior right temporal lobe relative to 4 months   prior. This could be further assessed with a contrast-enhanced brain MRI. XR CHEST PORTABLE   Final Result   Mixed interstitial and alveolar opacities throughout both lungs, increased   from 3 weeks prior.   Correlate for edema or seen.  Moderate left ventricular hypertrophy. Normal right ventricular size and function. Left atrial dilatation. Aortic valve is trileaflet. Mild aortic stenosis. Mild-moderate aortic insufficiency. Normal aortic root dimension. Mitral annular calcification is seen. Mild mitral regurgitation. Mild tricuspid regurgitation. Normal right ventricular systolic pressure. No significant pericardial effusion is seen. Pleural effusion present. Signature  ----------------------------------------------------------------------------  Electronically signed by Saira Price(Sonographer) on 02/07/2022 12:16  PM  ----------------------------------------------------------------------------    ----------------------------------------------------------------------------  Electronically signed by Nida Morales(Interpreting physician) on  02/07/2022 12:19 PM  ----------------------------------------------------------------------------  FINDINGS  Left Atrium  Left atrial dilatation. Left Ventricle  Normal left ventricle size and function with an estimated EF > 55%. No segmental wall motion abnormalities seen. Moderate left ventricular hypertrophy. Right Atrium  Right atrial dilatation. Right Ventricle  Normal right ventricular size and function. Mitral Valve  Mitral annular calcification is seen. Mild mitral regurgitation. Aortic Valve  Aortic valve is trileaflet. Mild aortic stenosis. Mild-moderate aortic insufficiency. Tricuspid Valve  Normal tricuspid valve structure and function. Mild tricuspid regurgitation. Normal right ventricular systolic pressure. Pulmonic Valve  The pulmonic valve is normal in structure. Mild pulmonic insufficiency. Pericardial Effusion  No significant pericardial effusion is seen. Pleural Effusion  Pleural effusion present. Miscellaneous  Normal aortic root dimension.   E/e' average 16.5  IVC normal diameter & inspiratory collapse indicating normal RA filling  pressure .    M-mode / 2D Measurements & Calculations:    LVIDd:4 cm(3.7 - 5.6 cm)         Diastolic CMELRU:94 ml  BREFF:1.19 cm(2.2 - 4.0 cm)      Systolic UWBHBW:67.5 ml  XAKO:6.94 cm(0.6 - 1.1 cm)       Aortic Root:2.9 cm(2.0 - 3.7 cm)  LVPWd:1.53 cm(0.6 - 1.1 cm)      LA Dimension: 5.4 cm(1.9 - 4.0 cm)  Fractional Shortenin %       LA volume/Index: 56.5 ml /37m^2  Calculated LVEF (%): 73.75 %     LVOT:2.2 cm    Mitral:                                 Aortic    Valve Area (P1/2-Time): 2.68 cm^2       Peak Velocity: 2.09 m/s  Peak E-Wave: 1.24 m/s                   Mean Velocity: 1.34 m/s  Peak A-Wave: 0.92 m/s                   Peak Gradient: 17.47 mmHg  E/A Ratio: 1.35                         Mean Gradient: 9 mmHg  Peak Gradient: 6.15 mmHg                AI P1/2t: 325 msec  Mean Gradient: 3 mmHg  Deceleration Time: 169 msec             Area (continuity): 1.84 cm^2  P1/2t: 82 msec                          AV VTI: 51.2 cm    Area (continuity): 2.32 cm^2  Mean Velocity: 0.85 m/s    Tricuspid:    Peak TR Velocity: 3.03 m/s  Peak TR Gradient: 36.7236 mmHg    Septal Wall E' velocity:0.07 m/s  Lateral Wall E' velocity:0.09 m/s       ASSESSMENT AND PLAN     Assessment:    //Bradycardia  //Chronic lymphedema  //JOY on CKD, hyperkalemia requiring hemodialysis  //Status post tunneled catheter placement  //Metabolic encephalopathy  //Acute hypoxic hypercapnic respiratory failure  //Acute on chronic CHF  //High risk of aspiration  //Leukocytosis -unclear etiology, infectious dease following      Plan:  On 2L NC  On dysphagia diet   continue IS and deep breathing   HD and volume status management per nerology  Discharge planning in progress per primary service    Jayde An MD  [address] [place]   2022    Please note that this chart was generated using voice recognition Dragon dictation software.   Although every effort was made to ensure the accuracy of this automated transcription, some errors in transcription may have occurred.

## 2022-07-14 ENCOUNTER — APPOINTMENT (OUTPATIENT)
Dept: GENERAL RADIOLOGY | Age: 72
DRG: 308 | End: 2022-07-14
Payer: OTHER GOVERNMENT

## 2022-07-14 LAB
ABSOLUTE EOS #: 0.12 K/UL (ref 0–0.44)
ABSOLUTE IMMATURE GRANULOCYTE: 0.16 K/UL (ref 0–0.3)
ABSOLUTE LYMPH #: 1.01 K/UL (ref 1.1–3.7)
ABSOLUTE MONO #: 0.78 K/UL (ref 0.1–1.2)
ANION GAP SERPL CALCULATED.3IONS-SCNC: 9 MMOL/L (ref 9–17)
BASOPHILS # BLD: 1 % (ref 0–2)
BASOPHILS ABSOLUTE: 0.05 K/UL (ref 0–0.2)
BUN BLDV-MCNC: 32 MG/DL (ref 8–23)
CALCIUM SERPL-MCNC: 8.2 MG/DL (ref 8.6–10.4)
CHLORIDE BLD-SCNC: 106 MMOL/L (ref 98–107)
CO2: 24 MMOL/L (ref 20–31)
CREAT SERPL-MCNC: 3.06 MG/DL (ref 0.5–0.9)
CULTURE: NORMAL
CULTURE: NORMAL
EOSINOPHILS RELATIVE PERCENT: 2 % (ref 1–4)
GFR AFRICAN AMERICAN: 18 ML/MIN
GFR NON-AFRICAN AMERICAN: 15 ML/MIN
GFR SERPL CREATININE-BSD FRML MDRD: ABNORMAL ML/MIN/{1.73_M2}
GLUCOSE BLD-MCNC: 102 MG/DL (ref 70–99)
GLUCOSE BLD-MCNC: 109 MG/DL (ref 65–105)
GLUCOSE BLD-MCNC: 88 MG/DL (ref 65–105)
HBV SURFACE AB TITR SER: <3.5 MIU/ML
HCT VFR BLD CALC: 27 % (ref 36.3–47.1)
HEMOGLOBIN: 7.8 G/DL (ref 11.9–15.1)
HEPATITIS B CORE IGM ANTIBODY: NONREACTIVE
HEPATITIS B SURFACE ANTIGEN: NONREACTIVE
IMMATURE GRANULOCYTES: 2 %
LYMPHOCYTES # BLD: 14 % (ref 24–43)
MCH RBC QN AUTO: 29.8 PG (ref 25.2–33.5)
MCHC RBC AUTO-ENTMCNC: 28.9 G/DL (ref 28.4–34.8)
MCV RBC AUTO: 103.1 FL (ref 82.6–102.9)
MONOCYTES # BLD: 11 % (ref 3–12)
NRBC AUTOMATED: 0 PER 100 WBC
PDW BLD-RTO: 16.7 % (ref 11.8–14.4)
PLATELET # BLD: 254 K/UL (ref 138–453)
PMV BLD AUTO: 9.3 FL (ref 8.1–13.5)
POTASSIUM SERPL-SCNC: 4.1 MMOL/L (ref 3.7–5.3)
RBC # BLD: 2.62 M/UL (ref 3.95–5.11)
RBC # BLD: ABNORMAL 10*6/UL
SEG NEUTROPHILS: 70 % (ref 36–65)
SEGMENTED NEUTROPHILS ABSOLUTE COUNT: 5.06 K/UL (ref 1.5–8.1)
SODIUM BLD-SCNC: 139 MMOL/L (ref 135–144)
SPECIMEN DESCRIPTION: NORMAL
SPECIMEN DESCRIPTION: NORMAL
WBC # BLD: 7.2 K/UL (ref 3.5–11.3)

## 2022-07-14 PROCEDURE — 6370000000 HC RX 637 (ALT 250 FOR IP): Performed by: INTERNAL MEDICINE

## 2022-07-14 PROCEDURE — 6360000002 HC RX W HCPCS: Performed by: INTERNAL MEDICINE

## 2022-07-14 PROCEDURE — 82947 ASSAY GLUCOSE BLOOD QUANT: CPT

## 2022-07-14 PROCEDURE — 80048 BASIC METABOLIC PNL TOTAL CA: CPT

## 2022-07-14 PROCEDURE — 90935 HEMODIALYSIS ONE EVALUATION: CPT

## 2022-07-14 PROCEDURE — 6370000000 HC RX 637 (ALT 250 FOR IP): Performed by: STUDENT IN AN ORGANIZED HEALTH CARE EDUCATION/TRAINING PROGRAM

## 2022-07-14 PROCEDURE — 6370000000 HC RX 637 (ALT 250 FOR IP): Performed by: FAMILY MEDICINE

## 2022-07-14 PROCEDURE — 2060000000 HC ICU INTERMEDIATE R&B

## 2022-07-14 PROCEDURE — 90935 HEMODIALYSIS ONE EVALUATION: CPT | Performed by: INTERNAL MEDICINE

## 2022-07-14 PROCEDURE — 36415 COLL VENOUS BLD VENIPUNCTURE: CPT

## 2022-07-14 PROCEDURE — 94640 AIRWAY INHALATION TREATMENT: CPT

## 2022-07-14 PROCEDURE — 2700000000 HC OXYGEN THERAPY PER DAY

## 2022-07-14 PROCEDURE — 71045 X-RAY EXAM CHEST 1 VIEW: CPT

## 2022-07-14 PROCEDURE — 99231 SBSQ HOSP IP/OBS SF/LOW 25: CPT | Performed by: INTERNAL MEDICINE

## 2022-07-14 PROCEDURE — 87340 HEPATITIS B SURFACE AG IA: CPT

## 2022-07-14 PROCEDURE — 87517 HEPATITIS B DNA QUANT: CPT

## 2022-07-14 PROCEDURE — 99232 SBSQ HOSP IP/OBS MODERATE 35: CPT | Performed by: INTERNAL MEDICINE

## 2022-07-14 PROCEDURE — 94761 N-INVAS EAR/PLS OXIMETRY MLT: CPT

## 2022-07-14 PROCEDURE — 85025 COMPLETE CBC W/AUTO DIFF WBC: CPT

## 2022-07-14 PROCEDURE — 86705 HEP B CORE ANTIBODY IGM: CPT

## 2022-07-14 PROCEDURE — 86317 IMMUNOASSAY INFECTIOUS AGENT: CPT

## 2022-07-14 RX ORDER — QUETIAPINE FUMARATE 25 MG/1
12.5 TABLET, FILM COATED ORAL NIGHTLY
Status: DISCONTINUED | OUTPATIENT
Start: 2022-07-14 | End: 2022-07-15

## 2022-07-14 RX ORDER — ALBUTEROL SULFATE 2.5 MG/3ML
2.5 SOLUTION RESPIRATORY (INHALATION) EVERY 4 HOURS PRN
Status: DISCONTINUED | OUTPATIENT
Start: 2022-07-14 | End: 2022-07-18 | Stop reason: HOSPADM

## 2022-07-14 RX ADMIN — EPOETIN ALFA-EPBX 5000 UNITS: 10000 INJECTION, SOLUTION INTRAVENOUS; SUBCUTANEOUS at 14:07

## 2022-07-14 RX ADMIN — MAGNESIUM GLUCONATE 500 MG ORAL TABLET 400 MG: 500 TABLET ORAL at 21:48

## 2022-07-14 RX ADMIN — HEPARIN SODIUM 1600 UNITS: 1000 INJECTION INTRAVENOUS; SUBCUTANEOUS at 14:35

## 2022-07-14 RX ADMIN — Medication 81 MG: at 15:26

## 2022-07-14 RX ADMIN — ALBUTEROL SULFATE 2.5 MG: 2.5 SOLUTION RESPIRATORY (INHALATION) at 02:50

## 2022-07-14 RX ADMIN — APIXABAN 5 MG: 5 TABLET, FILM COATED ORAL at 15:26

## 2022-07-14 RX ADMIN — FERROUS SULFATE TAB EC 325 MG (65 MG FE EQUIVALENT) 325 MG: 325 (65 FE) TABLET DELAYED RESPONSE at 15:26

## 2022-07-14 RX ADMIN — MAGNESIUM GLUCONATE 500 MG ORAL TABLET 400 MG: 500 TABLET ORAL at 15:26

## 2022-07-14 RX ADMIN — METOPROLOL TARTRATE 25 MG: 25 TABLET ORAL at 15:26

## 2022-07-14 RX ADMIN — ALBUTEROL SULFATE 2.5 MG: 2.5 SOLUTION RESPIRATORY (INHALATION) at 15:24

## 2022-07-14 RX ADMIN — LEVOTHYROXINE SODIUM 25 MCG: 25 TABLET ORAL at 06:47

## 2022-07-14 RX ADMIN — GUAIFENESIN 600 MG: 600 TABLET, EXTENDED RELEASE ORAL at 15:26

## 2022-07-14 RX ADMIN — POLYETHYLENE GLYCOL 3350 17 G: 17 POWDER, FOR SOLUTION ORAL at 21:50

## 2022-07-14 RX ADMIN — METOPROLOL TARTRATE 25 MG: 25 TABLET ORAL at 21:48

## 2022-07-14 RX ADMIN — ATORVASTATIN CALCIUM 40 MG: 80 TABLET, FILM COATED ORAL at 15:26

## 2022-07-14 RX ADMIN — GUAIFENESIN 600 MG: 600 TABLET, EXTENDED RELEASE ORAL at 21:51

## 2022-07-14 RX ADMIN — ANTI-FUNGAL POWDER MICONAZOLE NITRATE TALC FREE: 1.42 POWDER TOPICAL at 21:50

## 2022-07-14 RX ADMIN — AMIODARONE HYDROCHLORIDE 100 MG: 200 TABLET ORAL at 11:43

## 2022-07-14 RX ADMIN — QUETIAPINE FUMARATE 12.5 MG: 25 TABLET ORAL at 21:48

## 2022-07-14 RX ADMIN — APIXABAN 5 MG: 5 TABLET, FILM COATED ORAL at 21:48

## 2022-07-14 RX ADMIN — PANTOPRAZOLE SODIUM 40 MG: 40 TABLET, DELAYED RELEASE ORAL at 06:47

## 2022-07-14 RX ADMIN — ANTI-FUNGAL POWDER MICONAZOLE NITRATE TALC FREE: 1.42 POWDER TOPICAL at 15:29

## 2022-07-14 ASSESSMENT — ENCOUNTER SYMPTOMS
SORE THROAT: 0
DIARRHEA: 0
VOMITING: 0
SHORTNESS OF BREATH: 0
ABDOMINAL DISTENTION: 0
WHEEZING: 0
EYE REDNESS: 0
COUGH: 1
BLOOD IN STOOL: 0
PHOTOPHOBIA: 0
CONSTIPATION: 0
ABDOMINAL PAIN: 0
TROUBLE SWALLOWING: 0
NAUSEA: 0
CHEST TIGHTNESS: 0
TROUBLE SWALLOWING: 1
VOICE CHANGE: 0

## 2022-07-14 NOTE — PLAN OF CARE
Problem: Discharge Planning  Goal: Discharge to home or other facility with appropriate resources  7/14/2022 0106 by Roxanne Wilson RN  Outcome: Progressing  7/13/2022 1906 by Jasmin Gasca RN  Outcome: Progressing  Flowsheets (Taken 7/13/2022 0800)  Discharge to home or other facility with appropriate resources:   Identify barriers to discharge with patient and caregiver   Arrange for needed discharge resources and transportation as appropriate   Identify discharge learning needs (meds, wound care, etc)  7/13/2022 1552 by Jasmin Gasac RN  Outcome: Progressing  Flowsheets (Taken 7/13/2022 0800)  Discharge to home or other facility with appropriate resources:   Identify barriers to discharge with patient and caregiver   Arrange for needed discharge resources and transportation as appropriate   Identify discharge learning needs (meds, wound care, etc)     Problem: Chronic Conditions and Co-morbidities  Goal: Patient's chronic conditions and co-morbidity symptoms are monitored and maintained or improved  7/14/2022 0106 by Roxanne Wilson RN  Outcome: Progressing  7/13/2022 1906 by Jasmin Gasca RN  Outcome: Progressing  Flowsheets (Taken 7/13/2022 0800)  Care Plan - Patient's Chronic Conditions and Co-Morbidity Symptoms are Monitored and Maintained or Improved:   Monitor and assess patient's chronic conditions and comorbid symptoms for stability, deterioration, or improvement   Collaborate with multidisciplinary team to address chronic and comorbid conditions and prevent exacerbation or deterioration   Update acute care plan with appropriate goals if chronic or comorbid symptoms are exacerbated and prevent overall improvement and discharge  7/13/2022 1552 by Jasmin Gasca RN  Outcome: Progressing  Flowsheets (Taken 7/13/2022 0800)  Care Plan - Patient's Chronic Conditions and Co-Morbidity Symptoms are Monitored and Maintained or Improved:   Monitor and assess patient's chronic conditions and comorbid symptoms for stability, deterioration, or improvement   Collaborate with multidisciplinary team to address chronic and comorbid conditions and prevent exacerbation or deterioration   Update acute care plan with appropriate goals if chronic or comorbid symptoms are exacerbated and prevent overall improvement and discharge     Problem: Pain  Goal: Verbalizes/displays adequate comfort level or baseline comfort level  7/14/2022 0106 by Jann Mcmullen RN  Outcome: Progressing  Flowsheets (Taken 7/13/2022 1930)  Verbalizes/displays adequate comfort level or baseline comfort level: Encourage patient to monitor pain and request assistance  7/13/2022 1906 by Alphonso Harding RN  Outcome: Progressing  Flowsheets (Taken 7/13/2022 1552)  Verbalizes/displays adequate comfort level or baseline comfort level:   Encourage patient to monitor pain and request assistance   Assess pain using appropriate pain scale   Administer analgesics based on type and severity of pain and evaluate response   Implement non-pharmacological measures as appropriate and evaluate response  7/13/2022 1552 by Alphonso Harding RN  Outcome: Progressing  Flowsheets (Taken 7/13/2022 1552)  Verbalizes/displays adequate comfort level or baseline comfort level:   Encourage patient to monitor pain and request assistance   Assess pain using appropriate pain scale   Administer analgesics based on type and severity of pain and evaluate response   Implement non-pharmacological measures as appropriate and evaluate response     Problem: Skin/Tissue Integrity  Goal: Absence of new skin breakdown  Description: 1. Monitor for areas of redness and/or skin breakdown  2. Assess vascular access sites hourly  3. Every 4-6 hours minimum:  Change oxygen saturation probe site  4. Every 4-6 hours:  If on nasal continuous positive airway pressure, respiratory therapy assess nares and determine need for appliance change or resting period.   7/14/2022 0106 by Jann Mcmullen RN  Outcome: Progressing  7/13/2022 1906 by Oliver Amado RN  Outcome: Progressing  7/13/2022 1552 by Oliver Amado RN  Outcome: Progressing     Problem: Safety - Adult  Goal: Free from fall injury  7/14/2022 0106 by Nerissa Lackey RN  Outcome: Progressing  Flowsheets (Taken 7/14/2022 0100)  Free From Fall Injury: Instruct family/caregiver on patient safety  7/13/2022 1906 by Oliver Amado RN  Outcome: Progressing  Flowsheets (Taken 7/13/2022 1543)  Free From Fall Injury: Instruct family/caregiver on patient safety  7/13/2022 1552 by Oliver Amado RN  Outcome: Progressing  Flowsheets (Taken 7/13/2022 1543)  Free From Fall Injury: Instruct family/caregiver on patient safety     Problem: ABCDS Injury Assessment  Goal: Absence of physical injury  7/14/2022 0106 by Nerissa Lackey RN  Outcome: Progressing  7/13/2022 1906 by Oliver Amado RN  Outcome: Progressing  Flowsheets (Taken 7/13/2022 1543)  Absence of Physical Injury: Implement safety measures based on patient assessment  7/13/2022 1552 by Oliver Amado RN  Outcome: Progressing  Flowsheets (Taken 7/13/2022 1543)  Absence of Physical Injury: Implement safety measures based on patient assessment     Problem: Respiratory - Adult  Goal: Achieves optimal ventilation and oxygenation  7/14/2022 0106 by Nerissa Lackey RN  Outcome: Progressing  7/13/2022 1906 by Oliver Amado RN  Outcome: Progressing  Flowsheets (Taken 7/13/2022 0800)  Achieves optimal ventilation and oxygenation:   Assess for changes in respiratory status   Assess for changes in mentation and behavior   Position to facilitate oxygenation and minimize respiratory effort   Oxygen supplementation based on oxygen saturation or arterial blood gases  7/13/2022 1645 by Carolin Cavazos RCP  Outcome: Progressing  7/13/2022 1552 by Oliver Amado RN  Outcome: Progressing  Flowsheets (Taken 7/13/2022 0800)  Achieves optimal ventilation and oxygenation:   Assess for changes in respiratory status   Assess for changes

## 2022-07-14 NOTE — PLAN OF CARE
Problem: Nutrition Deficit:  Goal: Optimize nutritional status  7/14/2022 1216 by Anna Bear RD, LD  Flowsheets (Taken 7/14/2022 1212)  Nutrient intake appropriate for improving, restoring, or maintaining nutritional needs:   Assess nutritional status and recommend course of action   Recommend appropriate diets, oral nutritional supplements, and vitamin/mineral supplements   Monitor oral intake, labs, and treatment plans   BRONCHOSPASM/BRONCHOCONSTRICTION     [x]         IMPROVE AERATION/BREATH SOUNDS  [x]   ADMINISTER BRONCHODILATOR THERAPY AS APPROPRIATE  [x]   ASSESS BREATH SOUNDS  []   IMPLEMENT AEROSOL/MDI PROTOCOL  [x]   PATIENT EDUCATION AS NEEDED

## 2022-07-14 NOTE — PROGRESS NOTES
Dialysis Time Out  To be done by RN and tech or 2 RNs  Staff Names Tigist Hogan RN / Marleny Tolentino RN    [x]  Identity of the patient using 2 patient identifiers  [x]  Consent for treatment  [x]  Equipment-proper machine and dialyzer  [x]  B-Hep B status  [x]  Orders- to include bath, blood flow, dialyzer, time and fluid removal  [x]  Access-Correct site and in working order  [x]  Time for patient to ask questions.

## 2022-07-14 NOTE — CARE COORDINATION
Transitional planning    Call placed to [address] [place] [address] for update on plan for discharge, will follow up with Adelina Sanderson. Call to Elayne Hernandez at [facility] to follow up on referral.      8902 received call back from Elayne Hernandez, will send info over to [place]. Spoke with son Ever Stanford at bedside and agreeable to sending referral, as they have in house dialysis. 1200 call to Elayne Hernandez, is submitting insurance, requesting chest xray and Hep B panel, ps sent to MD.        1440 writer to room to discuss d/c plan, spoke with daughter updated on possibility for [facility]. Discussed dialysis transportation and explained to her that she wont have insurance coverage until Medicaid set up, advised her to follow back up with Radha Varela for direction and any questions. Daughter states she has no one else to help with transportation. Call to [facility], left message for Jaki Albert, with request for call back. Referral to [facility] sent.       1700 received vm from Elayne Hernandez, called back, unavailable , left vm with request for call back

## 2022-07-14 NOTE — PROGRESS NOTES
Dialysis Post Treatment Note  Vitals:    07/14/22 1446   BP: 153/59   Pulse: 78   Resp: 22   Temp: 97.9   SpO2:      Pre-Weight = 63.7kg  Post-weight = Weight: 135 lb 9.3 oz (61.2 kg)  Total Liters Processed = Blood Volume Processed (Liters): 83 l/min  Rinseback Volume (mL) = Rinseback Volume (ml): 250 ml  Net Removal (mL) =  2490mL  Patient's dry weight=  Type of access used= Right perm cath  Length of treatment=210 mins    Pt tolerated tx well; no acute distress noted pre,during or post tx.

## 2022-07-14 NOTE — PROGRESS NOTES
PULMONARY & CRITICAL CARE MEDICINE PROGRESS NOTE     Patient:  Hayley Salcido  MRN: [id number]  Admit date: 6/27/2022  Primary Care Physician: Marvel Kenney MD  Consulting Physician: Beti Cortez DO  CODE Status: Full Code  LOS: 16     SUBJECTIVE     Chief Complaint/ Reason for consult:    Admitted to ICU with bradycardia/acute respiratory failure    Hospital Course: The patient is a 66 y.o. female with prior history of of CKD stage IV( baseline creatinine 1.8-2), chronic diastolic CHF, hypertension, CAD s/p CABG in  in 2003 and occluded SVG to OM 2 and cath in 2006, COPD, type 2 diabetes, A. fib on amiodarone and Eliquis  Presented to ED on 6/27/2021 with complaints of shortness of breath hypoxia improved on 90s, and fatigue. Was found to be bradycardic heart rate in 35 by EMS and was placed on transcutaneous pacing and received atropine. She was subsequently started on epinephrine drip and admitted to the ICU. X-ray consistent with pulmonary edema. Cardiology was consulted and switched to dopamine drip, and Eliquis was switched to heparin drip. Was also noted to be hyperkalemic and started on hemodialysis sec to worsening CKD on 06/28/22. Neurology was consulted for encephalopathy : noted to have right anterior temporal fossa meningioma with possible edema. MRI considered nonemergent and deferred.,  ID was consulted for leukocytosis and b/l LE wounds which are chronic(sees wound care clinic outpatient) and Right gluteal /coccygeal ulcerations. Started on Cefepime. -On 6/28/2022 she had progressively worsening mentation with worsening hypoxia and hypercapnia she was intubated and started on HD. Heparin was held on 6/29-07/01 secondary to oozing at right IJ dialysis catheter site. Patient was continued on empiric antibiotics through 7/1/22(urine and blood cx NG), dialysis/UF and subsequently extubated on 07/06/22. Received 1u PRBC 7/7/22 for hb 7.0. transferred to floor on 7/8/22.      Interval History:  22  Seen and examined   No acute events   sleeping       Review Of Systems:  Review of Systems   Constitutional: Positive for activity change. Negative for chills and fever. HENT: Negative for postnasal drip, sore throat, trouble swallowing and voice change. Eyes: Negative for photophobia, redness and visual disturbance. Respiratory: Positive for cough. Negative for shortness of breath and wheezing. Cardiovascular: Negative for chest pain, palpitations and leg swelling. Gastrointestinal: Negative for abdominal distention, abdominal pain, constipation, diarrhea and vomiting. Endocrine: Negative for polydipsia, polyphagia and polyuria. Neurological: Negative for dizziness, syncope, speech difficulty and numbness. OBJECTIVE     PaO2/FiO2 RATIO:  No results for input(s): POCPO2 in the last 72 hours. FiO2 : 100 %     VITAL SIGNS:   LAST:  BP (!) 158/47   Pulse 80   Temp 98.6 °F (37 °C) (Oral)   Resp 18   Ht 4' 11\" (1.499 m)   Wt 149 lb 14.6 oz (68 kg)   SpO2 100%   BMI 30.28 kg/m²   8-24 HR RANGE:  TEMP Temp  Av.2 °F (36.8 °C)  Min: 97.8 °F (36.6 °C)  Max: 98.6 °F (37 °C)   BP Systolic (27SQT), TLQ:269 , Min:125 , HRW:466      Diastolic (47YGT), GME:16, Min:46, Max:67     PULSE Pulse  Av  Min: 68  Max: 88   RR Resp  Av.3  Min: 18  Max: 20   O2 SAT SpO2  Av.5 %  Min: 99 %  Max: 100 %   OXYGEN DELIVERY O2 Flow Rate (L/min)  Av L/min  Min: 2 L/min  Max: 2 L/min        Systemic Examination:   Physical Exam -  Constitutional: sleeping no wob     Lungs:  Bilateral air entry present, lung fields clear. Normal effort. Slightly decreased at bases mild upper airway sound present  Heart: regular rhythm, no murmur. Abdomen:  Soft, nontender, nondistended, normal bowel sounds. Extremities: Positive edema of upper extremities, Ace wrap present in both lower extremities. Skin:  Warm, dry, no gross lesions or rashes.     DATA REVIEW     Medications: Current Inpatient  Scheduled Meds:   QUEtiapine  12.5 mg Oral Nightly    guaiFENesin  600 mg Oral BID    polyethylene glycol  17 g Oral BID    hydrocortisone  25 mg Rectal BID    pantoprazole  40 mg Oral QAM AC    apixaban  5 mg Oral BID    metoprolol tartrate  25 mg Oral BID    epoetin rick-epbx  5,000 Units IntraVENous Q MWF    levothyroxine  25 mcg Oral Daily    amiodarone  100 mg Oral Daily    albuterol  2.5 mg Nebulization Q6H    aspirin EC  81 mg Oral Daily    atorvastatin  40 mg Oral Daily    magnesium oxide  400 mg Oral BID    miconazole   Topical BID    ferrous sulfate  325 mg Oral Daily with breakfast     Continuous Infusions:   sodium chloride      sodium chloride      sodium chloride      dextrose      sodium chloride 75 mL/hr at 07/06/22 2000       INPUT/OUTPUT:  No intake/output data recorded. LABS:  ABGs:   No results for input(s): POCPH, POCPCO2, POCPO2, POCHCO3, PJIJ5REY in the last 72 hours. CBC:   Recent Labs     07/12/22  0545 07/13/22  0829 07/14/22  0531   WBC 9.8 9.2 7.2   HGB 8.9* 8.8* 7.8*   HCT 27.7* 28.9* 27.0*   MCV 95.8 100.0 103.1*    285 254   LYMPHOPCT 10* 12* 14*   RBC 2.89* 2.89* 2.62*   MCH 30.8 30.4 29.8   MCHC 32.1 30.4 28.9   RDW 16.4* 16.7* 16.7*     CRP:   No results for input(s): CRP in the last 72 hours. LDH:   No results for input(s): LDH in the last 72 hours. BMP:   Recent Labs     07/12/22  0545 07/13/22  0829 07/14/22  0531    137 139   K 3.6* 3.9 4.1    104 106   CO2 26 25 24   BUN 15 24* 32*   CREATININE 2.00* 2.83* 3.06*   GLUCOSE 98 104* 102*   PHOS  --  3.1  --      Liver Function Test:   No results for input(s): PROT, LABALBU, ALT, AST, GGT, ALKPHOS, BILITOT in the last 72 hours. Coagulation Profile:   No results for input(s): INR, PROTIME, APTT in the last 72 hours. D-Dimer:  No results for input(s): DDIMER in the last 72 hours. Lactic Acid:  No results for input(s): LACTA in the last 72 hours.   Cardiac Enzymes:  No results for input(s): CKTOTAL, CKMB, CKMBINDEX, TROPONINI in the last 72 hours. Invalid input(s): TROPONIN, HSTROP  BNP/ProBNP:   No results for input(s): BNP, PROBNP in the last 72 hours. Triglycerides:  No results for input(s): TRIG in the last 72 hours. Microbiology:  Urine Culture:  No components found for: CURINE  Blood Culture:  No components found for: CBLOOD, CFUNGUSBL  Sputum Culture:  No components found for: CSPUTUM  No results for input(s): SPECDESC, SPECIAL, CULTURE, STATUS, ORG, CDIFFTOXPCR, CAMPYLOBPCR, SALMONELLAPC, SHIGAPCR, SHIGELLAPCR, MPNEUG, MPNEUM, LACTOQL in the last 72 hours. No results for input(s): SPUTUM, SPECDESC, SPECIAL, CULTURE, STATUS, ORG, CDIFFTOXPCR, MPNEUM, MPNEUG in the last 72 hours. Invalid input(s): CURINE, CBLOOD, CFUNGUSBL     Pathology:    Radiology Reports:  XR CHEST PORTABLE   Final Result   Findings are compatible with pulmonary edema. Bilateral pleural effusions. FL MODIFIED BARIUM SWALLOW W VIDEO   Final Result   1. Trace flash penetration without aspiration with the thin liquid substance   by straw. 2. No penetration or aspiration with the remainder of the administered   substances. Please see separate speech pathology report for full discussion of findings   and recommendations. IR TUNNELED CVC PLACE WO SQ PORT/PUMP > 5 YEARS   Final Result   Successful ultrasound and fluoroscopy guided tunneled catheter placement . Okay to use. XR CHEST PORTABLE   Final Result   Mild increased perihilar edema, mild increased perihilar opacification,   likely edema and CHF. Persistent small effusions and bibasilar atelectasis. Satisfactory position of endotracheal tube.          XR CHEST PORTABLE   Final Result   Stable support lines      Slight progression in now moderate diffuse bilateral interstitial infiltrates   related to increasing edema and/or infection with moderate bilateral pleural   effusions         XR CHEST PORTABLE Echocardiogram:   Results for orders placed during the hospital encounter of 02/06/22    ECHO Complete 2D W Doppler W Color    Narrative  [address] [facility]    Transthoracic Echocardiography Report (TTE)    Patient Name [id number]       Date of Study                 02/07/2022  [place] Essie    Date of      1950  Gender                        Female  Birth    Age          66 year(s)  Race                              Room Number  2090        Height:                       58.66 inch, 149 cm    Corporate ID [id number]    Weight:                       132 pounds, 60 kg  #    Patient Acct [de-identified]   BSA:           1.54 m^2       BMI:      27.03  #                                                                kg/m^2    MR #         [id number]      Sonographer                   Saira Price    Accession #  [id number]  Interpreting Physician        Yakov Marroquin    Fellow                   Referring Nurse Practitioner    Interpreting             Referring Physician           Jann Duong  Fellow                                                 Kailey Echeverria    Type of Study    TTE procedure:2D Echocardiogram, M-Mode, Doppler, Color Doppler. Procedure Date  Date: 02/07/2022 Start: 11:37 AM    Study Location: [address] [place] [address]  Technical Quality: Fair visualization    Indications:Congestive heart failure. History / Tech. Comments:  CABG DM CKD COPD HTN AF    Patient Status: Inpatient    Height: 58.66 inches Weight: 132.3 pounds BSA: 1.54 m^2 BMI: 27.03 kg/m^2    Rhythm: Within normal limits    Allergies  - Lisinopril. - Keflex. - *Unlisted:(Aleve,Claratin, Pioglitazone). CONCLUSIONS    Summary  Normal left ventricle size and function with an estimated EF > 55%. No segmental wall motion abnormalities seen. Moderate left ventricular hypertrophy. Normal right ventricular size and function. Left atrial dilatation. Aortic valve is trileaflet. Mild aortic stenosis.   Mild-moderate aortic insufficiency. Normal aortic root dimension. Mitral annular calcification is seen. Mild mitral regurgitation. Mild tricuspid regurgitation. Normal right ventricular systolic pressure. No significant pericardial effusion is seen. Pleural effusion present. Signature  ----------------------------------------------------------------------------  Electronically signed by Saira Price(Sonographer) on 02/07/2022 12:16  PM  ----------------------------------------------------------------------------    ----------------------------------------------------------------------------  Electronically signed by Dorcas Morales(Interpreting physician) on  02/07/2022 12:19 PM  ----------------------------------------------------------------------------  FINDINGS  Left Atrium  Left atrial dilatation. Left Ventricle  Normal left ventricle size and function with an estimated EF > 55%. No segmental wall motion abnormalities seen. Moderate left ventricular hypertrophy. Right Atrium  Right atrial dilatation. Right Ventricle  Normal right ventricular size and function. Mitral Valve  Mitral annular calcification is seen. Mild mitral regurgitation. Aortic Valve  Aortic valve is trileaflet. Mild aortic stenosis. Mild-moderate aortic insufficiency. Tricuspid Valve  Normal tricuspid valve structure and function. Mild tricuspid regurgitation. Normal right ventricular systolic pressure. Pulmonic Valve  The pulmonic valve is normal in structure. Mild pulmonic insufficiency. Pericardial Effusion  No significant pericardial effusion is seen. Pleural Effusion  Pleural effusion present. Miscellaneous  Normal aortic root dimension. E/e' average 16.5  IVC normal diameter & inspiratory collapse indicating normal RA filling  pressure .     M-mode / 2D Measurements & Calculations:    LVIDd:4 cm(3.7 - 5.6 cm)         Diastolic OLTDQF:78 ml  NTSAM:7.38 cm(2.2 - 4.0 cm)      Systolic FWFKSP:95.4 ml  JAMD:3.27 cm(0.6 - 1.1 cm) Aortic Root:2.9 cm(2.0 - 3.7 cm)  LVPWd:1.53 cm(0.6 - 1.1 cm)      LA Dimension: 5.4 cm(1.9 - 4.0 cm)  Fractional Shortenin %       LA volume/Index: 56.5 ml /37m^2  Calculated LVEF (%): 73.75 %     LVOT:2.2 cm    Mitral:                                 Aortic    Valve Area (P1/2-Time): 2.68 cm^2       Peak Velocity: 2.09 m/s  Peak E-Wave: 1.24 m/s                   Mean Velocity: 1.34 m/s  Peak A-Wave: 0.92 m/s                   Peak Gradient: 17.47 mmHg  E/A Ratio: 1.35                         Mean Gradient: 9 mmHg  Peak Gradient: 6.15 mmHg                AI P1/2t: 325 msec  Mean Gradient: 3 mmHg  Deceleration Time: 169 msec             Area (continuity): 1.84 cm^2  P1/2t: 82 msec                          AV VTI: 51.2 cm    Area (continuity): 2.32 cm^2  Mean Velocity: 0.85 m/s    Tricuspid:    Peak TR Velocity: 3.03 m/s  Peak TR Gradient: 36.7236 mmHg    Septal Wall E' velocity:0.07 m/s  Lateral Wall E' velocity:0.09 m/s       ASSESSMENT AND PLAN     Assessment:    Principal Problem:    Toxic metabolic encephalopathy improving   Active Problems:    Lymphedema    Acute respiratory failure with hypoxia and hypercapnia (HCC)improved on 2 l nc     Pulmonary edema cardiac cause (HCC)    Dependence on renal dialysis (HCC)    Nephrotic range proteinuria    External hemorrhoid    Dysphagia    Debility    Sepsis (HCC)    Acute on chronic diastolic congestive heart failure (HCC)comp     Acute kidney injury superimposed on CKD (HCC) on hd     Bradycardia    Hyperkalemia    Shock (HCC)    Leukocytosis    JOY (acute kidney injury) ([facility] [address]         Plan:  HD per nephrology   Dysphagia diet   Ok to [employer]   Will sign off     Electronically signed by Phi Izaguirre MD on 2022 at 10:02 AM   Please note that this chart was generated using voice recognition Dragon dictation software. Although every effort was made to ensure the accuracy of this automated transcription, some errors in transcription may have occurred.

## 2022-07-14 NOTE — PROGRESS NOTES
Occupational [facility]  Occupational Therapy Not Seen Note    DATE: 2022    NAME: [address] [place] [address] Kate  MRN: [id number]   : 1950      Patient not seen this date for Occupational Therapy due to:      Pt having dialysis. Will continue as able.       Electronically signed by DEEJAY Rubalcava on 2022 at 1:00 PM

## 2022-07-14 NOTE — PLAN OF CARE
Problem: Discharge Planning  Goal: Discharge to home or other facility with appropriate resources  Outcome: Progressing     Problem: Chronic Conditions and Co-morbidities  Goal: Patient's chronic conditions and co-morbidity symptoms are monitored and maintained or improved  Outcome: Progressing     Problem: Pain  Goal: Verbalizes/displays adequate comfort level or baseline comfort level  Outcome: Progressing     Problem: Skin/Tissue Integrity  Goal: Absence of new skin breakdown  Description: 1. Monitor for areas of redness and/or skin breakdown  2. Assess vascular access sites hourly  3. Every 4-6 hours minimum:  Change oxygen saturation probe site  4. Every 4-6 hours:  If on nasal continuous positive airway pressure, respiratory therapy assess nares and determine need for appliance change or resting period.   Outcome: Progressing     Problem: Safety - Adult  Goal: Free from fall injury  Outcome: Progressing     Problem: Respiratory - Adult  Goal: Achieves optimal ventilation and oxygenation  7/14/2022 1725 by Qamar Moralez RN  Outcome: Progressing     Problem: Nutrition Deficit:  Goal: Optimize nutritional status  7/14/2022 1725 by Qamar Moralez RN  Outcome: Progressing

## 2022-07-14 NOTE — PROGRESS NOTES
[facility]  Office: [address], DO, Anival Ogden, DO, Juanito Whitman, DO, Heaven Cody Blood, DO, Laverne Rosales MD, Robyn Singh MD, Bushra Mayberry MD, Melly Tavares MD, Cesar Hwang MD, Lester Carrera MD, Juan F Etienne MD, Valeria Cortez, DO, Josephine Courtney MD,  Stacy Kemp DO, Lul Ulloa MD, Lisette Jeffries MD, Sylwia Swartz DO, Janelle Barrientos MD, Darcy Daily MD, Paul Chase DO, Clementina Car MD, Katy Painting MD, Hilary Macias, [facility], [facility], CNP, Ria Barclay, CNP, Miguel Mcfarland, CNP, Reji Girard, CNP, Katey Coombs, CNP, Asim Petit PA-C, Roselyn Cannon, DNP, Johanna Swan, CNP, Aakash Calixto, CNP, Polina Mancia, CNP, Ry Arora, CNS, Radha Marks, [facility], Darryl Bello, CNP, Cristy Goldman, CNP, Amaury Eaton, [address] [place] [address]    Progress Note    7/14/2022    6:17 PM    Name:   Unique Barajas  MRN:     [id number]     Acct:      [de-identified]   Room:   2019/2019-01   Day:  16  Admit Date:  6/27/2022 12:28 PM    PCP:   Kristi Clark MD  Code Status:  Full Code    Subjective:     C/C:   Chief Complaint   Patient presents with    Bradycardia     Interval History Status:  improving    Patient complaining of copious secretions and headache. Given Tylenol by nursing. Patient has found placement for hemodialysis, plan to discharge when placement is found. She did not sleep last night, discussed giving Seroquel for hospital delirium    Brief History:     65-year-old with prior history of CKD stage IV( baseline creatinine 1.8-2), chronic diastolic CHF, hypertension, CAD s/p CABG in 2003 and occluded SVG to OM 2 and cath in 2006, COPD, type 2 diabetes, A. fib on amiodarone and Eliquis  Presented to ED on 6/27/2021 with complaints of shortness of breath hypoxia improved on 90s, and fatigue.   Was found to be bradycardic heart rate in 35 by EMS and was placed on (resolving). All other systems reviewed and are negative. Medications: Allergies:     Allergies   Allergen Reactions    Latex Rash    Aleve [Naproxen Sodium]      Chronic kidney disease stage III, CHF    Pioglitazone Other (See Comments)     Congestive heart failure    Claritin [Loratadine]     Keflex [Cephalexin]     Lisinopril      Needs clarification of contraindication       Current Meds:   Scheduled Meds:    QUEtiapine  12.5 mg Oral Nightly    guaiFENesin  600 mg Oral BID    polyethylene glycol  17 g Oral BID    hydrocortisone  25 mg Rectal BID    pantoprazole  40 mg Oral QAM AC    apixaban  5 mg Oral BID    metoprolol tartrate  25 mg Oral BID    epoetin rick-epbx  5,000 Units IntraVENous Q MWF    levothyroxine  25 mcg Oral Daily    amiodarone  100 mg Oral Daily    albuterol  2.5 mg Nebulization Q6H    aspirin EC  81 mg Oral Daily    atorvastatin  40 mg Oral Daily    magnesium oxide  400 mg Oral BID    miconazole   Topical BID    ferrous sulfate  325 mg Oral Daily with breakfast     Continuous Infusions:    sodium chloride      sodium chloride      sodium chloride      dextrose      sodium chloride 75 mL/hr at 07/06/22 2000     PRN Meds: haloperidol lactate, heparin (porcine), heparin (porcine), lidocaine, nitroglycerin, bisacodyl, sodium chloride, albumin human, sodium chloride flush, sodium chloride, sodium chloride, fentanNYL, glucose, dextrose bolus **OR** dextrose bolus, glucagon (rDNA), dextrose, sodium chloride flush, sodium chloride, ondansetron **OR** ondansetron, acetaminophen **OR** acetaminophen, morphine    Data:     Past Medical History:   has a past medical history of Acute CVA (cerebrovascular accident) ([facility] [address] Acute kidney injury superimposed on chronic kidney disease ([facility] [address] Acute on chronic combined systolic (congestive) and diastolic (congestive) heart failure ([address] JOY (acute kidney injury) ([facility] [address] Altered mental status, Anticoagulated, Arthritis, Asthma, Bradycardia, Brain mass, Cellulitis and abscess, Cellulitis and abscess of unspecified site, Cellulitis of both lower extremities, Cellulitis of left lower extremity, Cellulitis of lower extremity, CHF (congestive heart failure) (HCC), Chronic kidney disease, unspecified, CKD stage 4 secondary to hypertension ([facility] [address] Coronary atherosclerosis of native coronary artery, Elevated LFTs, Essential hypertension, Essential hypertension, malignant, Hallucinations, Hard of hearing, Head contusion, Hypertensive emergency, Hypertensive urgency with nyla, Hypokalemia, Hypomagnesemia, Iron deficiency anemia, unspecified, Meningioma ([address] Myocardial infarction ([facility] [address] Other and unspecified hyperlipidemia, Pure hypercholesterolemia, Stage 4 chronic kidney disease ([facility] [address] Toxic metabolic encephalopathy, Type 2 diabetes mellitus with stage 4 chronic kidney disease, without long-term current use of insulin ([facility] [address] Type II or unspecified type diabetes mellitus without mention of complication, not stated as uncontrolled, Unspecified asthma(493.90), Unspecified venous (peripheral) insufficiency, Unspecified vitamin D deficiency, and UTI (urinary tract infection). Social History:   reports that she quit smoking about 22 years ago. She has a 2.50 pack-year smoking history. She has never used smokeless tobacco. She reports previous alcohol use. She reports that she does not use drugs.      Family History:   Family History   Problem Relation Age of Onset    Diabetes Mother     Heart Disease Mother     Heart Disease Father     Diabetes Sister     High Blood Pressure Sister     Diabetes Maternal Grandmother        Vitals:  BP (!) 132/57   Pulse 87   Temp 97.5 °F (36.4 °C) (Oral)   Resp 20   Ht 4' 11\" (1.499 m)   Wt 134 lb 14.7 oz (61.2 kg)   SpO2 99%   BMI 27.25 kg/m²   Temp (24hrs), Av.1 °F (36.7 °C), Min:97.5 °F (36.4 °C), Max:98.6 °F (37 °C)    Recent Labs     22  1053 22  1606 22  0707 22  3655 POCGLU 120* 119* 88 109*       I/O (24Hr): Intake/Output Summary (Last 24 hours) at 7/14/2022 1817  Last data filed at 7/14/2022 1446  Gross per 24 hour   Intake 250 ml   Output 2740 ml   Net -2490 ml       Labs:  Hematology:  Recent Labs     07/12/22  0545 07/13/22  0829 07/14/22  0531   WBC 9.8 9.2 7.2   RBC 2.89* 2.89* 2.62*   HGB 8.9* 8.8* 7.8*   HCT 27.7* 28.9* 27.0*   MCV 95.8 100.0 103.1*   MCH 30.8 30.4 29.8   MCHC 32.1 30.4 28.9   RDW 16.4* 16.7* 16.7*    285 254   MPV 9.0 9.0 9.3     Chemistry:  Recent Labs     07/12/22  0545 07/13/22  0829 07/14/22  0531    137 139   K 3.6* 3.9 4.1    104 106   CO2 26 25 24   GLUCOSE 98 104* 102*   BUN 15 24* 32*   CREATININE 2.00* 2.83* 3.06*   ANIONGAP 8* 8* 9   LABGLOM 24* 16* 15*   GFRAA 30* 20* 18*   CALCIUM 8.2* 8.5* 8.2*   PHOS  --  3.1  --      Recent Labs     07/12/22 2057 07/13/22  0736 07/13/22  1053 07/13/22  1606 07/14/22  0707 07/14/22  1557   POCGLU 135* 109* 120* 119* 88 109*     ABG:  Lab Results   Component Value Date/Time    POCPH 7.411 07/06/2022 04:40 AM    PHART 7.459 03/09/2022 04:28 AM    POCPCO2 44.8 07/06/2022 04:40 AM    DRL0RUL 39.9 03/09/2022 04:28 AM    POCPO2 92.6 07/06/2022 04:40 AM    PO2ART 107.0 03/09/2022 04:28 AM    POCHCO3 28.5 07/06/2022 04:40 AM    ESZ1MNV 28.3 03/09/2022 04:28 AM    NBEA 5 06/28/2022 05:36 PM    PBEA 4 07/06/2022 04:40 AM    JUUZ0DXO 97 07/06/2022 04:40 AM    T3BFUBTQ 97.4 03/09/2022 04:28 AM    FIO2 30.0 07/06/2022 04:40 AM     Lab Results   Component Value Date/Time    SPECIAL NOT GIVEN 06/27/2022 01:11 PM     Lab Results   Component Value Date/Time    CULTURE NO GROWTH 5 DAYS 07/09/2022 05:12 AM    CULTURE NO GROWTH 5 DAYS 07/09/2022 05:12 AM       Radiology:  XR CHEST PORTABLE    Result Date: 7/3/2022  Mild increased perihilar edema, mild increased perihilar opacification, likely edema and CHF. Persistent small effusions and bibasilar atelectasis.  Satisfactory position of endotracheal tube.     IR TUNNELED CVC PLACE WO SQ PORT/PUMP > 5 YEARS    Result Date: 7/8/2022  Successful ultrasound and fluoroscopy guided tunneled catheter placement . Okay to use. Physical Examination:        Physical Exam  Vitals and nursing note reviewed. Constitutional:       General: She is not in acute distress. Interventions: Nasal cannula in place. HENT:      Head: Normocephalic and atraumatic. Eyes:      Conjunctiva/sclera: Conjunctivae normal.      Pupils: Pupils are equal, round, and reactive to light. Cardiovascular:      Rate and Rhythm: Normal rate and regular rhythm. Heart sounds: No murmur heard. Pulmonary:      Effort: Pulmonary effort is normal. No accessory muscle usage or respiratory distress. Breath sounds: Transmitted upper airway sounds present. No stridor. Rhonchi and rales present. No decreased breath sounds or wheezing. Chest:      Comments: Tunneled catheter noted   Abdominal:      General: Bowel sounds are normal. There is no distension. Palpations: Abdomen is soft. Abdomen is not rigid. Tenderness: There is no abdominal tenderness. There is no guarding. Musculoskeletal:         General: No tenderness. Skin:     General: Skin is warm and dry. Findings: No erythema, lesion or rash. Neurological:      Mental Status: She is alert. She is disoriented. Cranial Nerves: No cranial nerve deficit. Motor: No seizure activity. Psychiatric:         Speech: Speech normal.         Behavior: Behavior normal. Behavior is cooperative.          Assessment:        Hospital Problems           Last Modified POA    * (Principal) Toxic metabolic encephalopathy 4/75/5964 Yes    Lymphedema 6/28/2022 Yes    Acute respiratory failure with hypoxia and hypercapnia ([address] 6/28/2022 Yes    Pulmonary edema cardiac cause ([address] 6/28/2022 Yes    Dependence on renal dialysis ([address] 7/5/2022 Yes    Nephrotic range proteinuria 7/5/2022 Yes    External hemorrhoid

## 2022-07-14 NOTE — PROGRESS NOTES
Comprehensive Nutrition Assessment    Type and Reason for Visit:  Reassess    Nutrition Recommendations/Plan:   1. Liberalize diet as able to encourage PO intakes. Monitor PO intakes. 2. Will continue to provide frozen Magic Cups and Clear Liquid ONS. 3. Monitor labs, weights, and plan of care. 4. Consider starting an appetite stimulant to improve PO intakes. Malnutrition Assessment:  Malnutrition Status: At risk for malnutrition (Comment) (07/14/22 1214)    Context:  Acute Illness     Findings of the 6 clinical characteristics of malnutrition:  Energy Intake:  50% or less of estimated energy requirements for 5 or more days  Weight Loss:  Unable to assess - Weight fluctuations noted - ? d/t dialysis and fluids. Body Fat Loss:  Unable to assess   Muscle Mass Loss:  Unable to assess  Fluid Accumulation:  Mild Extremities,Generalized   Strength:  Not Performed    Nutrition Assessment:    Pt off unit in dialysis at visit. RN reports pt \"nibbled\" at her breakfast this morning. Pt has been taking only small amounts of meals and ONS. Noted yesterday pt was eating the frozen Magic Cup. Will continue to provide ONS with meals. Last BM 7/13. Labs/Meds reviewed. Weight fluctuations since admission noted. Nutrition Related Findings:    Labs/Meds reviewed. Last BM 7/13. Wound Type: Multiple,Pressure Injury,Unstageable (to right buttocks and left foot; venous stasis to bilateral legs)       Current Nutrition Intake & Therapies:    Average Meal Intake: 1-25%  Average Supplements Intake:  (Variable intakes of 0-100%)  ADULT DIET; Dysphagia - Soft and Bite Sized; Low Sodium (2 gm);  Low Phosphorus (Less than 1000 mg); 1800 ml  ADULT ORAL NUTRITION SUPPLEMENT; Breakfast, Lunch, Dinner; Frozen Oral Supplement  ADULT ORAL NUTRITION SUPPLEMENT; Breakfast; Clear Liquid Oral Supplement    Anthropometric Measures:  Height: 4' 11\" (149.9 cm)  Ideal Body Weight (IBW): 95 lbs (43 kg)    Admission Body Weight: 151 lb 1.7 oz (68.5 kg)  Current Body Weight: 140 lb 6.9 oz (63.7 kg), 147.8 % IBW. Weight Source: Bed Scale  Current BMI (kg/m2): 28.3        Weight Adjustment For: No Adjustment                 BMI Categories: Overweight (BMI 25.0-29. 9)    Estimated Daily Nutrient Needs:  Energy Requirements Based On: Kcal/kg  Weight Used for Energy Requirements: Current  Energy (kcal/day): 8473-3890 kcals/day  Weight Used for Protein Requirements: Ideal  Protein (g/day): 65 g pro/day  Fluid (ml/day): 1800 mL/day per dier order/MD    Nutrition Diagnosis:   · Inadequate oral intake related to pain (appetite; current condition) as evidenced by intake 0-25%,intake 26-50% (variable PO intakes; need for ONS)    Nutrition Interventions:   Food and/or Nutrient Delivery: Continue Current Diet,Continue Oral Nutrition Supplement  Nutrition Education/Counseling: No recommendation at this time  Coordination of Nutrition Care: Continue to monitor while inpatient  Plan of Care discussed with: RN    Goals:  Previous Goal Met: Progressing toward Goal(s)  Goals: PO intake 50% or greater,prior to discharge       Nutrition Monitoring and Evaluation:   Behavioral-Environmental Outcomes: None Identified  Food/Nutrient Intake Outcomes: Food and Nutrient Intake,Supplement Intake  Physical Signs/Symptoms Outcomes: Biochemical Data,Chewing or Swallowing,GI Status,Fluid Status or Edema,Nutrition Focused Physical Findings,Skin,Weight    Discharge Planning:     Too soon to determine     Nba Mansfield RD, LD  Contact: [phone]

## 2022-07-14 NOTE — PROGRESS NOTES
Renal Progress Note    Patient :  Yvonne He; 66 y.o. MRN# [id number]  Location:  2019/2019-01  Attending:  Marc Tiwari DO  Admit Date:  6/27/2022   Hospital Day: 17      Subjective: Following for JOY/ATN initiated on dialysis this admission. Admitted with hypoxia and bradycardia requiring intubation. Extubated 7/6/22. Tunnel placed 7/8/22. Patient was seen and examined on HD. Tolerating the procedure well. No new issues reported overnight. Patient does exhibit positive weakness/fatigue. Currently getting dialysis as per TTS schedule.  trying to find outpatient hemodialysis spot and ECF.     Outpatient Medications:     Medications Prior to Admission: ferrous sulfate (IRON 325) 325 (65 Fe) MG tablet, Take 1 tablet by mouth daily (with breakfast)  [DISCONTINUED] apixaban (ELIQUIS) 2.5 MG TABS tablet, Take 1 tablet by mouth 2 times daily  [DISCONTINUED] apixaban (ELIQUIS) 2.5 MG TABS tablet, Take 1 tablet by mouth 2 times daily  [DISCONTINUED] ferrous sulfate (IRON 325) 325 (65 Fe) MG tablet, Take 1 tablet by mouth daily (with breakfast)  [DISCONTINUED] fluticasone-salmeterol (ADVAIR HFA) 115-21 MCG/ACT inhaler, Inhale 2 puffs into the lungs 2 times daily  metoprolol tartrate (LOPRESSOR) 25 MG tablet, Take 1 tablet by mouth 2 times daily  aspirin EC 81 MG EC tablet, Take 1 tablet by mouth daily  albuterol sulfate HFA (PROAIR HFA) 108 (90 Base) MCG/ACT inhaler, Inhale 2 puffs into the lungs every 6 hours as needed for Wheezing or Shortness of Breath (cough)  fluticasone-salmeterol (ADVAIR HFA) 115-21 MCG/ACT inhaler, Inhale 2 puffs into the lungs 2 times daily Maintenance inhaler  [DISCONTINUED] hydrALAZINE (APRESOLINE) 25 MG tablet, Take 1 tablet by mouth 3 times daily  [DISCONTINUED] albuterol sulfate HFA (PROAIR HFA) 108 (90 Base) MCG/ACT inhaler, Inhale 2 puffs into the lungs every 6 hours as needed for Wheezing or Shortness of Breath (cough)  [DISCONTINUED] metoprolol tartrate (LOPRESSOR) 25 MG tablet, Take 1 tablet by mouth 2 times daily  [DISCONTINUED] aspirin EC 81 MG EC tablet, Take 1 tablet by mouth daily  [DISCONTINUED] hydrALAZINE (APRESOLINE) 25 MG tablet, Take 1 tablet by mouth 3 times daily  amLODIPine (NORVASC) 10 MG tablet, Take 1 tablet by mouth nightly  Blood Pressure KIT, Diagnosis: HTN. Needs to check blood pressure 1-2 times a day until stable, then once a day. Goal blood pressure less than 135/85, and above 110/60. vitamin D (ERGOCALCIFEROL) 1.25 MG (08833 UT) CAPS capsule, Take 1 capsule by mouth once a week Sundays  [DISCONTINUED] amiodarone (CORDARONE) 200 MG tablet, Take 1 tablet by mouth daily  Nebulizers (COMPRESSOR/NEBULIZER) MISC, Nebulizer with compressor. Disposable Med Nebs 2 /month. Reusable Med Nebs 1 per 6 months. Aerosol Mask 1 per month. Replacement Filters 2 per month. All other related supplies as needed per month. Medications being used: Albuterol . 083 unit dose vial. Frequency: every 6 hrs x 4/day . Length of Need: 1 (Patient not taking: Reported on 6/17/2022)  Misc.  Devices (ADJUST FOLD CANE/YORK HANDLE) MISC, Use daily for walking  Handicap Placard MISC, by Does not apply route Expires on 12/30/25  [DISCONTINUED] albuterol (PROVENTIL) (2.5 MG/3ML) 0.083% nebulizer solution, Take 3 mLs by nebulization every 6 hours as needed for Wheezing or Shortness of Breath (Patient not taking: Reported on 6/17/2022)  atorvastatin (LIPITOR) 40 MG tablet, Take 1 tablet by mouth once daily  desloratadine (CLARINEX) 5 MG tablet, Take 1 tablet by mouth once daily  FreeStyle Lancets MISC, 1 each by Does not apply route daily Patient needs to contact office before any further refills will be approved  magnesium oxide (MAG-OX) 400 (241.3 Mg) MG TABS tablet, Take 1 tablet by mouth twice daily  miconazole (MICOTIN) 2 % powder, Apply topically 2 times daily UNDER THE SKIN FOLDS LONG TERM  Multiple Vitamins-Minerals (THERAPEUTIC MULTIVITAMIN-MINERALS) tablet, Take 1 tablet by mouth daily  blood glucose test strips (FREESTYLE LITE) strip, 1 each by In Vitro route daily As needed. Blood Pressure KIT, 1 kit by Does not apply route three times daily (Patient not taking: Reported on 2022)  blood glucose monitor kit and supplies, 1 kit by Other route three times daily Dispense Butterfly Elite CBG Device    Current Medications:     Scheduled Meds:    QUEtiapine  12.5 mg Oral Nightly    guaiFENesin  600 mg Oral BID    polyethylene glycol  17 g Oral BID    hydrocortisone  25 mg Rectal BID    pantoprazole  40 mg Oral QAM AC    apixaban  5 mg Oral BID    metoprolol tartrate  25 mg Oral BID    epoetin rick-epbx  5,000 Units IntraVENous Q MWF    levothyroxine  25 mcg Oral Daily    amiodarone  100 mg Oral Daily    albuterol  2.5 mg Nebulization Q6H    aspirin EC  81 mg Oral Daily    atorvastatin  40 mg Oral Daily    magnesium oxide  400 mg Oral BID    miconazole   Topical BID    ferrous sulfate  325 mg Oral Daily with breakfast     Continuous Infusions:    sodium chloride      sodium chloride      sodium chloride      dextrose      sodium chloride 75 mL/hr at 22     PRN Meds:  haloperidol lactate, heparin (porcine), heparin (porcine), lidocaine, nitroglycerin, bisacodyl, sodium chloride, albumin human, sodium chloride flush, sodium chloride, sodium chloride, fentanNYL, glucose, dextrose bolus **OR** dextrose bolus, glucagon (rDNA), dextrose, sodium chloride flush, sodium chloride, ondansetron **OR** ondansetron, acetaminophen **OR** acetaminophen, morphine    Input/Output:       No intake/output data recorded. .      Patient Vitals for the past 96 hrs (Last 3 readings):   Weight   22 1050 140 lb 6.9 oz (63.7 kg)   22 0415 149 lb 14.6 oz (68 kg)   22 1333 138 lb 14.2 oz (63 kg)       Vital Signs:   Temperature:  Temp: 98 °F (36.7 °C)  TMax:   Temp (24hrs), Av.2 °F (36.8 °C), Min:97.9 °F (36.6 °C), Max:98.6 °F (37 °C)    Respirations:  Resp: 22  Pulse:   Heart Rate: 86  BP:    BP: (!) 144/64  BP Range: Systolic (68UWZ), YDD:167 , Min:126 , DWR:096       Diastolic (39PEH), LJL:14, Min:46, Max:67      Physical Examination:     General:  AAO x 3, speaking in full sentences, no accessory muscle use. HEENT: Atraumatic, normocephalic, no throat congestion, moist mucosa. Eyes:   Pupils equal, round and reactive to light, EOMI. Neck:   Supple  Chest:   Diminished   Cardiac:  S1 S2 irregular, no murmurs, gallops or rubs. Abdomen: Soft, non-tender, no masses or organomegaly, BS audible. :   No suprapubic or flank tenderness. Neuro:  AAO x 3, No FND. SKIN:  No rashes, good skin turgor. Extremities:  1+ edema bilateral lower extremities. Labs:       Recent Labs     07/12/22  0545 07/13/22  0829 07/14/22  0531   WBC 9.8 9.2 7.2   RBC 2.89* 2.89* 2.62*   HGB 8.9* 8.8* 7.8*   HCT 27.7* 28.9* 27.0*   MCV 95.8 100.0 103.1*   MCH 30.8 30.4 29.8   MCHC 32.1 30.4 28.9   RDW 16.4* 16.7* 16.7*    285 254   MPV 9.0 9.0 9.3      BMP:   Recent Labs     07/12/22 0545 07/13/22  0829 07/14/22  0531    137 139   K 3.6* 3.9 4.1    104 106   CO2 26 25 24   BUN 15 24* 32*   CREATININE 2.00* 2.83* 3.06*   GLUCOSE 98 104* 102*   CALCIUM 8.2* 8.5* 8.2*     YRIS:      Lab Results   Component Value Date/Time    YRIS NEGATIVE 10/27/2020 04:37 PM     SPEP:  Lab Results   Component Value Date/Time    PROT 5.3 06/27/2022 02:59 PM    ALBCAL 2.5 06/01/2022 03:00 PM    ALBPCT 48 06/01/2022 03:00 PM    LABALPH 0.3 06/01/2022 03:00 PM    LABALPH 1.2 06/01/2022 03:00 PM    A1PCT 6 06/01/2022 03:00 PM    A2PCT 23 06/01/2022 03:00 PM    LABBETA 0.7 06/01/2022 03:00 PM    BETAPCT 13 06/01/2022 03:00 PM    GAMGLOB 0.5 06/01/2022 03:00 PM    GGPCT 10 06/01/2022 03:00 PM    PATH ELECTRONICALLY SIGNED. Tiago Gonzalez M.D. 06/01/2022 03:00 PM    PATH ELECTRONICALLY SIGNED.  Tiago Gonzalez M.D. 06/01/2022 03:00 PM     UPEP:     Lab Results   Component Value Date/Time LABPE  10/27/2020 04:35 PM     ELEVATED PROTEIN CONCENTRATION. MOST SERUM PROTEINS ARE DETECTED IN THIS URINE. USUALLY OBSERVED WITH MARKEDLY INCREASED NON-SELECTIVE GLOMERULAR PERMEABILITY (i.e. SEVERE GLOMERULAR DISEASE), CONTAMINATION OF     C3:     Lab Results   Component Value Date/Time    C3 147 10/27/2020 04:37 PM     C4:     Lab Results   Component Value Date/Time    C4 29 10/27/2020 04:37 PM     MPO ANCA:     Lab Results   Component Value Date/Time    MPO 5 10/27/2020 04:37 PM     PR3 ANCA:     Lab Results   Component Value Date/Time    PR3 16 10/27/2020 04:37 PM     Hep BsAg:         Lab Results   Component Value Date/Time    HEPBSAG NONREACTIVE 06/28/2022 02:56 PM     Hep C AB:          Lab Results   Component Value Date/Time    HEPCAB NONREACTIVE 06/28/2022 02:56 PM       Urinalysis/Chemistries:      Lab Results   Component Value Date/Time    NITRU NEGATIVE 06/27/2022 06:35 PM    COLORU Dark Yellow 06/27/2022 06:35 PM    PHUR 5.5 06/27/2022 06:35 PM    WBCUA 20 TO 50 06/27/2022 06:35 PM    RBCUA 20 TO 50 06/27/2022 06:35 PM    MUCUS 1+ 06/27/2022 06:35 PM    TRICHOMONAS NOT REPORTED 02/07/2022 02:42 AM    YEAST NOT REPORTED 02/07/2022 02:42 AM    BACTERIA FEW 06/12/2022 09:33 PM    SPECGRAV 1.030 06/27/2022 06:35 PM    LEUKOCYTESUR SMALL 06/27/2022 06:35 PM    UROBILINOGEN Normal 06/27/2022 06:35 PM    [address] NEGATIVE 06/27/2022 06:35 PM    GLUCOSEU 1+ 06/27/2022 06:35 PM    KETUA TRACE 06/27/2022 06:35 PM    AMORPHOUS NOT REPORTED 02/07/2022 02:42 AM     Urine Sodium:     Lab Results   Component Value Date/Time    SLOAN <20 06/08/2022 09:52 PM      Urine Creatinine:     Lab Results   Component Value Date/Time    LABCREA 63.0 06/08/2022 09:52 PM     Radiology:     Reviewed. Assessment:     1. JOY currently HD dependent, likely secondary to ATN from hypotension requiring pressor support vs progression from underlying diabetic nephropathy, getting dialysis as per TTS schedule.   Hemodialysis initiated on 6/28/2022. Right IJ tunneled cath placed 7/8/22  2. Hyperkalemia likely from progressive decline in renal function, now improved with dialysis. 3. Nephrotic range proteinuria likely due to diabetic nephropathy. 4. Diabetes type 2.  5. Extubated 7/6/22. 6. Nonhealing lower extremity ulcers. 7. Hypotension did require pressor support this admission. 8. Fluid overload  9. Drop in hemoglobin stable with no overt signs of bleeding. Plan:   1. Patient was seen and examined on HD at bedside. Orders were confirmed with the HD nurse. 2. Monitor strict I/Os and renal function for signs of improvement. 3. BMP in AM.  4.  working towards setting up outpatient dialysis spot. 5. Will follow. Nutrition   Please ensure that patient is on a renal diet/TF. Avoid nephrotoxic drugs/contrast exposure. Vamsi Garcia MD   Nephrology [address] [place] [address] Drive    This note is created with the assistance of a speech-recognition program. While intending to generate a document that actually reflects the content of the visit, no guarantees can be provided that every mistake has been identified and corrected by editing.

## 2022-07-15 LAB
ABSOLUTE EOS #: 0.13 K/UL (ref 0–0.44)
ABSOLUTE IMMATURE GRANULOCYTE: 0.14 K/UL (ref 0–0.3)
ABSOLUTE LYMPH #: 1.27 K/UL (ref 1.1–3.7)
ABSOLUTE MONO #: 0.77 K/UL (ref 0.1–1.2)
ANION GAP SERPL CALCULATED.3IONS-SCNC: 8 MMOL/L (ref 9–17)
BASOPHILS # BLD: 1 % (ref 0–2)
BASOPHILS ABSOLUTE: 0.07 K/UL (ref 0–0.2)
BUN BLDV-MCNC: 19 MG/DL (ref 8–23)
CALCIUM SERPL-MCNC: 7.8 MG/DL (ref 8.6–10.4)
CHLORIDE BLD-SCNC: 100 MMOL/L (ref 98–107)
CO2: 27 MMOL/L (ref 20–31)
CREAT SERPL-MCNC: 1.9 MG/DL (ref 0.5–0.9)
EOSINOPHILS RELATIVE PERCENT: 2 % (ref 1–4)
GFR AFRICAN AMERICAN: 31 ML/MIN
GFR NON-AFRICAN AMERICAN: 26 ML/MIN
GFR SERPL CREATININE-BSD FRML MDRD: ABNORMAL ML/MIN/{1.73_M2}
GLUCOSE BLD-MCNC: 125 MG/DL (ref 65–105)
GLUCOSE BLD-MCNC: 150 MG/DL (ref 65–105)
GLUCOSE BLD-MCNC: 84 MG/DL (ref 65–105)
GLUCOSE BLD-MCNC: 90 MG/DL (ref 65–105)
GLUCOSE BLD-MCNC: 93 MG/DL (ref 70–99)
HCT VFR BLD CALC: 27.7 % (ref 36.3–47.1)
HEMOGLOBIN: 8.2 G/DL (ref 11.9–15.1)
IMMATURE GRANULOCYTES: 2 %
LYMPHOCYTES # BLD: 15 % (ref 24–43)
MCH RBC QN AUTO: 30.4 PG (ref 25.2–33.5)
MCHC RBC AUTO-ENTMCNC: 29.6 G/DL (ref 28.4–34.8)
MCV RBC AUTO: 102.6 FL (ref 82.6–102.9)
MONOCYTES # BLD: 9 % (ref 3–12)
NRBC AUTOMATED: 0 PER 100 WBC
PDW BLD-RTO: 16.9 % (ref 11.8–14.4)
PLATELET # BLD: 404 K/UL (ref 138–453)
PMV BLD AUTO: 10 FL (ref 8.1–13.5)
POTASSIUM SERPL-SCNC: 3.8 MMOL/L (ref 3.7–5.3)
RBC # BLD: 2.7 M/UL (ref 3.95–5.11)
RBC # BLD: ABNORMAL 10*6/UL
SEG NEUTROPHILS: 72 % (ref 36–65)
SEGMENTED NEUTROPHILS ABSOLUTE COUNT: 5.96 K/UL (ref 1.5–8.1)
SODIUM BLD-SCNC: 135 MMOL/L (ref 135–144)
WBC # BLD: 8.3 K/UL (ref 3.5–11.3)

## 2022-07-15 PROCEDURE — 2700000000 HC OXYGEN THERAPY PER DAY

## 2022-07-15 PROCEDURE — 85025 COMPLETE CBC W/AUTO DIFF WBC: CPT

## 2022-07-15 PROCEDURE — 99231 SBSQ HOSP IP/OBS SF/LOW 25: CPT | Performed by: INTERNAL MEDICINE

## 2022-07-15 PROCEDURE — 6370000000 HC RX 637 (ALT 250 FOR IP): Performed by: FAMILY MEDICINE

## 2022-07-15 PROCEDURE — 97530 THERAPEUTIC ACTIVITIES: CPT

## 2022-07-15 PROCEDURE — 94761 N-INVAS EAR/PLS OXIMETRY MLT: CPT

## 2022-07-15 PROCEDURE — 6370000000 HC RX 637 (ALT 250 FOR IP): Performed by: STUDENT IN AN ORGANIZED HEALTH CARE EDUCATION/TRAINING PROGRAM

## 2022-07-15 PROCEDURE — 36415 COLL VENOUS BLD VENIPUNCTURE: CPT

## 2022-07-15 PROCEDURE — 2060000000 HC ICU INTERMEDIATE R&B

## 2022-07-15 PROCEDURE — 82947 ASSAY GLUCOSE BLOOD QUANT: CPT

## 2022-07-15 PROCEDURE — 99232 SBSQ HOSP IP/OBS MODERATE 35: CPT | Performed by: INTERNAL MEDICINE

## 2022-07-15 PROCEDURE — 6370000000 HC RX 637 (ALT 250 FOR IP): Performed by: INTERNAL MEDICINE

## 2022-07-15 PROCEDURE — 97110 THERAPEUTIC EXERCISES: CPT

## 2022-07-15 PROCEDURE — 80048 BASIC METABOLIC PNL TOTAL CA: CPT

## 2022-07-15 RX ORDER — QUETIAPINE FUMARATE 25 MG/1
25 TABLET, FILM COATED ORAL NIGHTLY
Status: DISCONTINUED | OUTPATIENT
Start: 2022-07-15 | End: 2022-07-18 | Stop reason: HOSPADM

## 2022-07-15 RX ADMIN — APIXABAN 5 MG: 5 TABLET, FILM COATED ORAL at 09:13

## 2022-07-15 RX ADMIN — MAGNESIUM GLUCONATE 500 MG ORAL TABLET 400 MG: 500 TABLET ORAL at 09:13

## 2022-07-15 RX ADMIN — GUAIFENESIN 600 MG: 600 TABLET, EXTENDED RELEASE ORAL at 09:14

## 2022-07-15 RX ADMIN — LEVOTHYROXINE SODIUM 25 MCG: 25 TABLET ORAL at 09:14

## 2022-07-15 RX ADMIN — GUAIFENESIN 600 MG: 600 TABLET, EXTENDED RELEASE ORAL at 21:10

## 2022-07-15 RX ADMIN — MAGNESIUM GLUCONATE 500 MG ORAL TABLET 400 MG: 500 TABLET ORAL at 21:11

## 2022-07-15 RX ADMIN — AMIODARONE HYDROCHLORIDE 100 MG: 200 TABLET ORAL at 09:13

## 2022-07-15 RX ADMIN — PANTOPRAZOLE SODIUM 40 MG: 40 TABLET, DELAYED RELEASE ORAL at 09:15

## 2022-07-15 RX ADMIN — QUETIAPINE FUMARATE 25 MG: 25 TABLET ORAL at 21:10

## 2022-07-15 RX ADMIN — METOPROLOL TARTRATE 25 MG: 25 TABLET ORAL at 09:13

## 2022-07-15 RX ADMIN — POLYETHYLENE GLYCOL 3350 17 G: 17 POWDER, FOR SOLUTION ORAL at 21:12

## 2022-07-15 RX ADMIN — FERROUS SULFATE TAB EC 325 MG (65 MG FE EQUIVALENT) 325 MG: 325 (65 FE) TABLET DELAYED RESPONSE at 14:02

## 2022-07-15 RX ADMIN — ATORVASTATIN CALCIUM 40 MG: 80 TABLET, FILM COATED ORAL at 09:16

## 2022-07-15 RX ADMIN — ANTI-FUNGAL POWDER MICONAZOLE NITRATE TALC FREE: 1.42 POWDER TOPICAL at 21:11

## 2022-07-15 RX ADMIN — METOPROLOL TARTRATE 25 MG: 25 TABLET ORAL at 21:10

## 2022-07-15 RX ADMIN — ACETAMINOPHEN 325MG 650 MG: 325 TABLET ORAL at 10:53

## 2022-07-15 RX ADMIN — APIXABAN 5 MG: 5 TABLET, FILM COATED ORAL at 21:10

## 2022-07-15 RX ADMIN — ANTI-FUNGAL POWDER MICONAZOLE NITRATE TALC FREE: 1.42 POWDER TOPICAL at 09:14

## 2022-07-15 RX ADMIN — Medication 81 MG: at 09:15

## 2022-07-15 ASSESSMENT — PAIN SCALES - GENERAL
PAINLEVEL_OUTOF10: 5
PAINLEVEL_OUTOF10: 0
PAINLEVEL_OUTOF10: 0

## 2022-07-15 ASSESSMENT — ENCOUNTER SYMPTOMS
DIARRHEA: 0
SHORTNESS OF BREATH: 0
WHEEZING: 0
ABDOMINAL PAIN: 0
CONSTIPATION: 0
COUGH: 1
TROUBLE SWALLOWING: 1
NAUSEA: 0
CHEST TIGHTNESS: 0
VOMITING: 0
BLOOD IN STOOL: 0

## 2022-07-15 NOTE — PLAN OF CARE
Problem: Discharge Planning  Goal: Discharge to home or other facility with appropriate resources  7/15/2022 0515 by Jann Mcmullen RN  Outcome: Progressing  7/14/2022 1725 by Chayo Camp RN  Outcome: Progressing     Problem: Chronic Conditions and Co-morbidities  Goal: Patient's chronic conditions and co-morbidity symptoms are monitored and maintained or improved  7/15/2022 0515 by Jann Mcmullen RN  Outcome: Progressing  7/14/2022 1725 by Chayo Camp RN  Outcome: Progressing     Problem: Pain  Goal: Verbalizes/displays adequate comfort level or baseline comfort level  7/15/2022 0515 by Jann Mcmullen RN  Outcome: Progressing  7/14/2022 1725 by Chayo Camp RN  Outcome: Progressing     Problem: Skin/Tissue Integrity  Goal: Absence of new skin breakdown  Description: 1. Monitor for areas of redness and/or skin breakdown  2. Assess vascular access sites hourly  3. Every 4-6 hours minimum:  Change oxygen saturation probe site  4. Every 4-6 hours:  If on nasal continuous positive airway pressure, respiratory therapy assess nares and determine need for appliance change or resting period.   7/15/2022 0515 by Jann Mcmullen RN  Outcome: Progressing  7/14/2022 1725 by Chayo Camp RN  Outcome: Progressing     Problem: Safety - Adult  Goal: Free from fall injury  7/15/2022 0515 by Jann Mcmullen RN  Outcome: Progressing  7/14/2022 1725 by Chayo Camp RN  Outcome: Progressing     Problem: ABCDS Injury Assessment  Goal: Absence of physical injury  7/15/2022 0515 by Jann Mcmullen RN  Outcome: Progressing  7/14/2022 1725 by Chayo Camp RN  Outcome: Progressing     Problem: Respiratory - Adult  Goal: Achieves optimal ventilation and oxygenation  7/15/2022 0515 by Jann Mcmullen RN  Outcome: Progressing  7/14/2022 1725 by Chayo Camp RN  Outcome: Progressing  7/14/2022 1723 by Gonzalo Berger RCP  Outcome: Progressing  7/14/2022 1722 by Gonzalo Berger RCP  Outcome: Progressing Problem: Nutrition Deficit:  Goal: Optimize nutritional status  7/15/2022 0515 by William Varela, RN  Outcome: Progressing  7/14/2022 1725 by Prince Doe RN  Outcome: Progressing

## 2022-07-15 NOTE — PROGRESS NOTES
Renal Progress Note    Patient :  Shad Moy; 66 y.o. MRN# [id number]  Location:  2019/2019-01  Attending:  Jones Cuevas DO  Admit Date:  6/27/2022   Hospital Day: 18      Subjective: Following for JOY/ATN initiated on dialysis this admission. Admitted with hypoxia and bradycardia requiring intubation. Extubated 7/6/22. Tunnel placed 7/8/22. Patient was seen and examined. No new issues reported overnight. Patient did have dialysis yesterday ran for 210 minutes and got about 2.4 L removed. Patient still exhibits positive weakness/fatigue. Currently getting dialysis as per TTS schedule.  trying to find outpatient hemodialysis spot and ECF.     Outpatient Medications:     Medications Prior to Admission: ferrous sulfate (IRON 325) 325 (65 Fe) MG tablet, Take 1 tablet by mouth daily (with breakfast)  [DISCONTINUED] apixaban (ELIQUIS) 2.5 MG TABS tablet, Take 1 tablet by mouth 2 times daily  [DISCONTINUED] apixaban (ELIQUIS) 2.5 MG TABS tablet, Take 1 tablet by mouth 2 times daily  [DISCONTINUED] ferrous sulfate (IRON 325) 325 (65 Fe) MG tablet, Take 1 tablet by mouth daily (with breakfast)  [DISCONTINUED] fluticasone-salmeterol (ADVAIR HFA) 115-21 MCG/ACT inhaler, Inhale 2 puffs into the lungs 2 times daily  metoprolol tartrate (LOPRESSOR) 25 MG tablet, Take 1 tablet by mouth 2 times daily  aspirin EC 81 MG EC tablet, Take 1 tablet by mouth daily  albuterol sulfate HFA (PROAIR HFA) 108 (90 Base) MCG/ACT inhaler, Inhale 2 puffs into the lungs every 6 hours as needed for Wheezing or Shortness of Breath (cough)  fluticasone-salmeterol (ADVAIR HFA) 115-21 MCG/ACT inhaler, Inhale 2 puffs into the lungs 2 times daily Maintenance inhaler  [DISCONTINUED] hydrALAZINE (APRESOLINE) 25 MG tablet, Take 1 tablet by mouth 3 times daily  [DISCONTINUED] albuterol sulfate HFA (PROAIR HFA) 108 (90 Base) MCG/ACT inhaler, Inhale 2 puffs into the lungs every 6 hours as needed for Wheezing or Shortness of Breath (cough)  [DISCONTINUED] metoprolol tartrate (LOPRESSOR) 25 MG tablet, Take 1 tablet by mouth 2 times daily  [DISCONTINUED] aspirin EC 81 MG EC tablet, Take 1 tablet by mouth daily  [DISCONTINUED] hydrALAZINE (APRESOLINE) 25 MG tablet, Take 1 tablet by mouth 3 times daily  amLODIPine (NORVASC) 10 MG tablet, Take 1 tablet by mouth nightly  Blood Pressure KIT, Diagnosis: HTN. Needs to check blood pressure 1-2 times a day until stable, then once a day. Goal blood pressure less than 135/85, and above 110/60. vitamin D (ERGOCALCIFEROL) 1.25 MG (77888 UT) CAPS capsule, Take 1 capsule by mouth once a week Sundays  [DISCONTINUED] amiodarone (CORDARONE) 200 MG tablet, Take 1 tablet by mouth daily  Nebulizers (COMPRESSOR/NEBULIZER) MISC, Nebulizer with compressor. Disposable Med Nebs 2 /month. Reusable Med Nebs 1 per 6 months. Aerosol Mask 1 per month. Replacement Filters 2 per month. All other related supplies as needed per month. Medications being used: Albuterol . 083 unit dose vial. Frequency: every 6 hrs x 4/day . Length of Need: 1 (Patient not taking: Reported on 6/17/2022)  Misc.  Devices (ADJUST FOLD CANE/YORK HANDLE) MISC, Use daily for walking  Handicap Placard MISC, by Does not apply route Expires on 12/30/25  [DISCONTINUED] albuterol (PROVENTIL) (2.5 MG/3ML) 0.083% nebulizer solution, Take 3 mLs by nebulization every 6 hours as needed for Wheezing or Shortness of Breath (Patient not taking: Reported on 6/17/2022)  atorvastatin (LIPITOR) 40 MG tablet, Take 1 tablet by mouth once daily  desloratadine (CLARINEX) 5 MG tablet, Take 1 tablet by mouth once daily  FreeStyle Lancets MISC, 1 each by Does not apply route daily Patient needs to contact office before any further refills will be approved  magnesium oxide (MAG-OX) 400 (241.3 Mg) MG TABS tablet, Take 1 tablet by mouth twice daily  miconazole (MICOTIN) 2 % powder, Apply topically 2 times daily UNDER THE SKIN FOLDS LONG TERM  Multiple Vitamins-Minerals (THERAPEUTIC MULTIVITAMIN-MINERALS) tablet, Take 1 tablet by mouth daily  blood glucose test strips (FREESTYLE LITE) strip, 1 each by In Vitro route daily As needed. Blood Pressure KIT, 1 kit by Does not apply route three times daily (Patient not taking: Reported on 2022)  blood glucose monitor kit and supplies, 1 kit by Other route three times daily Dispense Butterfly Elite CBG Device    Current Medications:     Scheduled Meds:    QUEtiapine  25 mg Oral Nightly    guaiFENesin  600 mg Oral BID    polyethylene glycol  17 g Oral BID    pantoprazole  40 mg Oral QAM AC    apixaban  5 mg Oral BID    metoprolol tartrate  25 mg Oral BID    epoetin rick-epbx  5,000 Units IntraVENous Q MWF    levothyroxine  25 mcg Oral Daily    amiodarone  100 mg Oral Daily    aspirin EC  81 mg Oral Daily    atorvastatin  40 mg Oral Daily    magnesium oxide  400 mg Oral BID    miconazole   Topical BID    ferrous sulfate  325 mg Oral Daily with breakfast     Continuous Infusions:    sodium chloride      sodium chloride      sodium chloride      dextrose      sodium chloride 75 mL/hr at 22 2000     PRN Meds:  albuterol, haloperidol lactate, heparin (porcine), heparin (porcine), lidocaine, nitroglycerin, bisacodyl, sodium chloride, albumin human, sodium chloride flush, sodium chloride, sodium chloride, fentanNYL, glucose, dextrose bolus **OR** dextrose bolus, glucagon (rDNA), dextrose, sodium chloride flush, sodium chloride, ondansetron **OR** ondansetron, acetaminophen **OR** acetaminophen, morphine    Input/Output:       I/O last 3 completed shifts: In: 250   Out: 2740 .       Patient Vitals for the past 96 hrs (Last 3 readings):   Weight   07/15/22 0600 139 lb 15.9 oz (63.5 kg)   22 1446 134 lb 14.7 oz (61.2 kg)   22 1050 140 lb 6.9 oz (63.7 kg)       Vital Signs:   Temperature:  Temp: 98.4 °F (36.9 °C)  TMax:   Temp (24hrs), Av.4 °F (36.9 °C), Min:97.5 °F (36.4 °C), Max:98.9 °F (37.2 °C)    Respirations:  Resp: 20  Pulse: Heart Rate: 75  BP:    BP: (!) 131/52  BP Range: Systolic (70VIJ), CSM:264 , Min:131 , VMZ:717       Diastolic (30HZQ), ZGN:42, Min:45, Max:74      Physical Examination:     General:  AAO x 3, speaking in full sentences, no accessory muscle use. HEENT: Atraumatic, normocephalic, no throat congestion, moist mucosa. Eyes:   Pupils equal, round and reactive to light, EOMI. Neck:   Supple  Chest:   Diminished   Cardiac:  S1 S2 irregular, no murmurs, gallops or rubs. Abdomen: Soft, non-tender, no masses or organomegaly, BS audible. :   No suprapubic or flank tenderness. Neuro:   AAO x 3, No FND. SKIN:  No rashes, good skin turgor. Extremities:  1+ edema bilateral lower extremities. Labs:       Recent Labs     07/13/22  0829 07/14/22  0531 07/15/22  0708   WBC 9.2 7.2 8.3   RBC 2.89* 2.62* 2.70*   HGB 8.8* 7.8* 8.2*   HCT 28.9* 27.0* 27.7*   .0 103.1* 102.6   MCH 30.4 29.8 30.4   MCHC 30.4 28.9 29.6   RDW 16.7* 16.7* 16.9*    254 404   MPV 9.0 9.3 10.0      BMP:   Recent Labs     07/13/22  0829 07/14/22  0531 07/15/22  0708    139 135   K 3.9 4.1 3.8    106 100   CO2 25 24 27   BUN 24* 32* 19   CREATININE 2.83* 3.06* 1.90*   GLUCOSE 104* 102* 93   CALCIUM 8.5* 8.2* 7.8*     YRIS:      Lab Results   Component Value Date/Time    YRIS NEGATIVE 10/27/2020 04:37 PM     SPEP:  Lab Results   Component Value Date/Time    PROT 5.3 06/27/2022 02:59 PM    ALBCAL 2.5 06/01/2022 03:00 PM    ALBPCT 48 06/01/2022 03:00 PM    LABALPH 0.3 06/01/2022 03:00 PM    LABALPH 1.2 06/01/2022 03:00 PM    A1PCT 6 06/01/2022 03:00 PM    A2PCT 23 06/01/2022 03:00 PM    LABBETA 0.7 06/01/2022 03:00 PM    BETAPCT 13 06/01/2022 03:00 PM    GAMGLOB 0.5 06/01/2022 03:00 PM    GGPCT 10 06/01/2022 03:00 PM    PATH ELECTRONICALLY SIGNED. Harper Gould M.D. 06/01/2022 03:00 PM    PATH ELECTRONICALLY SIGNED.  Harper Gould M.D. 06/01/2022 03:00 PM     UPEP:     Lab Results   Component Value Date/Time    LABPE 10/27/2020 04:35 PM     ELEVATED PROTEIN CONCENTRATION. MOST SERUM PROTEINS ARE DETECTED IN THIS URINE. USUALLY OBSERVED WITH MARKEDLY INCREASED NON-SELECTIVE GLOMERULAR PERMEABILITY (i.e. SEVERE GLOMERULAR DISEASE), CONTAMINATION OF     C3:     Lab Results   Component Value Date/Time    C3 147 10/27/2020 04:37 PM     C4:     Lab Results   Component Value Date/Time    C4 29 10/27/2020 04:37 PM     MPO ANCA:     Lab Results   Component Value Date/Time    MPO 5 10/27/2020 04:37 PM     PR3 ANCA:     Lab Results   Component Value Date/Time    PR3 16 10/27/2020 04:37 PM     Hep BsAg:         Lab Results   Component Value Date/Time    HEPBSAG NONREACTIVE 07/14/2022 02:22 PM     Hep C AB:          Lab Results   Component Value Date/Time    HEPCAB NONREACTIVE 06/28/2022 02:56 PM       Urinalysis/Chemistries:      Lab Results   Component Value Date/Time    NITRU NEGATIVE 06/27/2022 06:35 PM    COLORU Dark Yellow 06/27/2022 06:35 PM    PHUR 5.5 06/27/2022 06:35 PM    WBCUA 20 TO 50 06/27/2022 06:35 PM    RBCUA 20 TO 50 06/27/2022 06:35 PM    MUCUS 1+ 06/27/2022 06:35 PM    TRICHOMONAS NOT REPORTED 02/07/2022 02:42 AM    YEAST NOT REPORTED 02/07/2022 02:42 AM    BACTERIA FEW 06/12/2022 09:33 PM    SPECGRAV 1.030 06/27/2022 06:35 PM    LEUKOCYTESUR SMALL 06/27/2022 06:35 PM    UROBILINOGEN Normal 06/27/2022 06:35 PM    [address] NEGATIVE 06/27/2022 06:35 PM    GLUCOSEU 1+ 06/27/2022 06:35 PM    KETUA TRACE 06/27/2022 06:35 PM    AMORPHOUS NOT REPORTED 02/07/2022 02:42 AM     Urine Sodium:     Lab Results   Component Value Date/Time    SLOAN <20 06/08/2022 09:52 PM      Urine Creatinine:     Lab Results   Component Value Date/Time    LABCREA 63.0 06/08/2022 09:52 PM     Radiology:     Reviewed. Assessment:     JOY currently HD dependent, likely secondary to ATN from hypotension requiring pressor support vs progression from underlying diabetic nephropathy, getting dialysis as per TTS schedule.   Hemodialysis initiated on 6/28/2022. Right IJ tunneled cath placed 7/8/22  Hyperkalemia likely from progressive decline in renal function, now improved with dialysis. Nephrotic range proteinuria likely due to diabetic nephropathy. Diabetes type 2. Extubated 7/6/22. Nonhealing lower extremity ulcers. Hypotension did require pressor support this admission. Fluid overload  Drop in hemoglobin stable with no overt signs of bleeding. Plan:   No acute need for dialysis today. To get regular dialysis in a.m. currently on TTS schedule. Monitor strict I/Os and renal function for signs of improvement. BMP in AM.   working towards setting up outpatient dialysis spot. Will follow. Nutrition   Please ensure that patient is on a renal diet/TF. Avoid nephrotoxic drugs/contrast exposure. Vamsi Cox MD   Nephrology [address] [place] [address] Drive    This note is created with the assistance of a speech-recognition program. While intending to generate a document that actually reflects the content of the visit, no guarantees can be provided that every mistake has been identified and corrected by editing.

## 2022-07-15 NOTE — PROGRESS NOTES
[facility]  Office: [address], DO, Lorsanthosh Din, DO, Prasad Acron, DO, Grey Contreras Blood, DO, Chandler Lester MD, Elias Kern MD, Rita Sellers MD, Gaye Hardy MD, Jared Zheng MD, Gideon Mathew MD, Donna Madsen MD, Norberto Sampson, DO, Bettye Bishop MD,  Fred Kidd, DO, Judit Ellis MD, Alphonso Vargas MD, Justine Gupta, DO, Brii Mendoza MD, Latonya Max MD, Tien Flores, DO, Larry Gomez MD, Nirmal Zayas MD, Barbara Kilgore, CNP, [facility] Gut[facility]rez, CNP, Guerda Guthrie, CNP, Adelia Alpers, [facility], Dayna Blanc, CNP, Trista Moore, CNP, Carlos Womack, PA-C, Celeste Mascorro, DNP, Angel Pizarro, [facility], Anika Smith, CNP, Karen Harrell, CNP, Marcia Hopson, CNS, Nathan Gagnon, [facility], Lalita Gtz, CNP, Gayla Dean, [facility], [facility], [address] [place] [address]    Progress Note    7/15/2022    2:00 PM    Name:   Juana Forde  MRN:     [id number]     Acct:      [de-identified]   Room:   2019/2019-01   Day:  25  Admit Date:  6/27/2022 12:28 PM    PCP:   Martine Mancia MD  Code Status:  Full Code    Subjective:     C/C:   Chief Complaint   Patient presents with    Bradycardia     Interval History Status:  improving    Patient appeared much more appropriate today. She took seroquel last night but only slept a few hours. Patient was awake much of the night and slept some part of today. Discussed with daughter and son. Discussed on IDR with CM and nursing. Still attempting to find placement. Brief History:     76-year-old with prior history of CKD stage IV( baseline creatinine 1.8-2), chronic diastolic CHF, hypertension, CAD s/p CABG in 2003 and occluded SVG to OM 2 and cath in 2006, COPD, type 2 diabetes, A. fib on amiodarone and Eliquis  Presented to ED on 6/27/2021 with complaints of shortness of breath hypoxia improved on 90s, and fatigue.   Was found to be bradycardic heart rate in 35 by EMS and was placed on transcutaneous pacing and received atropine. She was subsequently started on epinephrine drip and admitted to the ICU. X-ray consistent with pulmonary edema. Cardiology was consulted and switched to dopamine drip, and Eliquis was switched to heparin WBP. Was also noted to be hyperkalemic and started on hemodialysis sec to worsening CKD on 06/28/22. Neurology was consulted for encephalopathy : noted to have right anterior temporal fossa meningioma with possible edema. MRI considered nonemergent and deferred.,   ID was consulted for leukocytosis and b/l LE wounds which are chronic(sees wound care clinic outpatient) and Right gluteal /coccygeal ulcerations. Started on Cefepime.   -On 6/28/2022 she had progressively worsening mentation with worsening hypoxia and hypercapnia she was intubated and started on HD. Heparin was held on 6/29-07/01 secondary to oozing at right IJ dialysis catheter site. Patient was continued on empiric antibiotics through 7/1/22(urine and blood cx NG), dialysis/UF and subsequently extubated on 07/06/22. Received 1u PRBC 7/7/22 for hb 7.0  Today she is on 2l NC O2, K 3.9, bicarb 27, Hb 8.4, lethargic, arousable but falling asleep easily, appears tired. Review of Systems:     Review of Systems   Constitutional:  Positive for activity change and appetite change. Negative for chills, diaphoresis and fever. HENT:  Positive for trouble swallowing. Negative for congestion. Eyes:  Negative for visual disturbance. Respiratory:  Positive for cough. Negative for chest tightness, shortness of breath and wheezing. Cardiovascular:  Negative for chest pain, palpitations and leg swelling. Gastrointestinal:  Negative for abdominal pain, blood in stool, constipation, diarrhea, nausea and vomiting. Genitourinary:  Negative for difficulty urinating. Neurological:  Negative for dizziness, weakness, light-headedness, numbness and headaches.    Psychiatric/Behavioral: Positive for confusion (resolving). All other systems reviewed and are negative. Medications: Allergies:     Allergies   Allergen Reactions    Latex Rash    Aleve [Naproxen Sodium]      Chronic kidney disease stage III, CHF    Pioglitazone Other (See Comments)     Congestive heart failure    Claritin [Loratadine]     Keflex [Cephalexin]     Lisinopril      Needs clarification of contraindication       Current Meds:   Scheduled Meds:    QUEtiapine  25 mg Oral Nightly    guaiFENesin  600 mg Oral BID    polyethylene glycol  17 g Oral BID    pantoprazole  40 mg Oral QAM AC    apixaban  5 mg Oral BID    metoprolol tartrate  25 mg Oral BID    epoetin rick-epbx  5,000 Units IntraVENous Q MWF    levothyroxine  25 mcg Oral Daily    amiodarone  100 mg Oral Daily    aspirin EC  81 mg Oral Daily    atorvastatin  40 mg Oral Daily    magnesium oxide  400 mg Oral BID    miconazole   Topical BID    ferrous sulfate  325 mg Oral Daily with breakfast     Continuous Infusions:    sodium chloride      sodium chloride      sodium chloride      dextrose      sodium chloride 75 mL/hr at 07/06/22 2000     PRN Meds: albuterol, haloperidol lactate, heparin (porcine), heparin (porcine), lidocaine, nitroglycerin, bisacodyl, sodium chloride, albumin human, sodium chloride flush, sodium chloride, sodium chloride, fentanNYL, glucose, dextrose bolus **OR** dextrose bolus, glucagon (rDNA), dextrose, sodium chloride flush, sodium chloride, ondansetron **OR** ondansetron, acetaminophen **OR** acetaminophen, morphine    Data:     Past Medical History:   has a past medical history of Acute CVA (cerebrovascular accident) ([facility] [address] Acute on chronic combined systolic (congestive) and diastolic (congestive) heart failure (HCC), Arthritis, Asthma, Bradycardia, ESRD (end stage renal disease) ([address] Essential hypertension, Hallucinations, Hard of hearing, Head contusion, Iron deficiency anemia, unspecified, Meningioma ([address] Myocardial infarction ([address] Pure hypercholesterolemia, Type 2 diabetes mellitus with stage 4 chronic kidney disease, without long-term current use of insulin ([address] Unspecified venous (peripheral) insufficiency, and Unspecified vitamin D deficiency. Social History:   reports that she quit smoking about 22 years ago. She has a 2.50 pack-year smoking history. She has never used smokeless tobacco. She reports that she does not currently use alcohol. She reports that she does not use drugs. Family History:   Family History   Problem Relation Age of Onset    Diabetes Mother     Heart Disease Mother     Heart Disease Father     Diabetes Sister     High Blood Pressure Sister     Diabetes Maternal Grandmother        Vitals:  BP (!) 131/52   Pulse 75   Temp 98.4 °F (36.9 °C) (Oral)   Resp 20   Ht 4' 11\" (1.499 m)   Wt 139 lb 15.9 oz (63.5 kg)   SpO2 99%   BMI 28.27 kg/m²   Temp (24hrs), Av.4 °F (36.9 °C), Min:97.5 °F (36.4 °C), Max:98.9 °F (37.2 °C)    Recent Labs     22  0707 22  1557 07/15/22  0645 07/15/22  1106   POCGLU 88 109* 84 90         I/O (24Hr):     Intake/Output Summary (Last 24 hours) at 7/15/2022 1400  Last data filed at 2022 1446  Gross per 24 hour   Intake 250 ml   Output 2740 ml   Net -2490 ml         Labs:  Hematology:  Recent Labs     22  0829 22  0531 07/15/22  0708   WBC 9.2 7.2 8.3   RBC 2.89* 2.62* 2.70*   HGB 8.8* 7.8* 8.2*   HCT 28.9* 27.0* 27.7*   .0 103.1* 102.6   MCH 30.4 29.8 30.4   MCHC 30.4 28.9 29.6   RDW 16.7* 16.7* 16.9*    254 404   MPV 9.0 9.3 10.0       Chemistry:  Recent Labs     22  0829 22  0531 07/15/22  0708    139 135   K 3.9 4.1 3.8    106 100   CO2 25 24 27   GLUCOSE 104* 102* 93   BUN 24* 32* 19   CREATININE 2.83* 3.06* 1.90*   ANIONGAP 8* 9 8*   LABGLOM 16* 15* 26*   GFRAA 20* 18* 31*   CALCIUM 8.5* 8.2* 7.8*   PHOS 3.1  --   --        Recent Labs     22  1053 22  1606 22  0707 22  1550 07/15/22  0645 07/15/22  1106   POCGLU 120* 119* 88 109* 84 90       ABG:  Lab Results   Component Value Date/Time    POCPH 7.411 07/06/2022 04:40 AM    PHART 7.459 03/09/2022 04:28 AM    POCPCO2 44.8 07/06/2022 04:40 AM    FOK8EAZ 39.9 03/09/2022 04:28 AM    POCPO2 92.6 07/06/2022 04:40 AM    PO2ART 107.0 03/09/2022 04:28 AM    POCHCO3 28.5 07/06/2022 04:40 AM    GDH2KOR 28.3 03/09/2022 04:28 AM    NBEA 5 06/28/2022 05:36 PM    PBEA 4 07/06/2022 04:40 AM    GSCJ3UDT 97 07/06/2022 04:40 AM    B2QFEPXQ 97.4 03/09/2022 04:28 AM    FIO2 30.0 07/06/2022 04:40 AM     Lab Results   Component Value Date/Time    SPECIAL NOT GIVEN 06/27/2022 01:11 PM     Lab Results   Component Value Date/Time    CULTURE NO GROWTH 5 DAYS 07/09/2022 05:12 AM    CULTURE NO GROWTH 5 DAYS 07/09/2022 05:12 AM       Radiology:  XR CHEST PORTABLE    Result Date: 7/3/2022  Mild increased perihilar edema, mild increased perihilar opacification, likely edema and CHF. Persistent small effusions and bibasilar atelectasis. Satisfactory position of endotracheal tube. IR TUNNELED CVC PLACE WO SQ PORT/PUMP > 5 YEARS    Result Date: 7/8/2022  Successful ultrasound and fluoroscopy guided tunneled catheter placement . Okay to use. Physical Examination:        Physical Exam  Vitals and nursing note reviewed. Constitutional:       General: She is not in acute distress. Interventions: Nasal cannula in place. HENT:      Head: Normocephalic and atraumatic. Eyes:      Conjunctiva/sclera: Conjunctivae normal.      Pupils: Pupils are equal, round, and reactive to light. Cardiovascular:      Rate and Rhythm: Normal rate and regular rhythm. Heart sounds: No murmur heard. Pulmonary:      Effort: Pulmonary effort is normal. No accessory muscle usage or respiratory distress. Breath sounds: Transmitted upper airway sounds present. No stridor. Rhonchi and rales present. No decreased breath sounds or wheezing.    Chest:      Comments: Tunneled PM

## 2022-07-15 NOTE — PROGRESS NOTES
Physical Therapy  Facility/Department: [facility] CAR 2  Daily Treatment Note     Name: Yane Lang  : 6256  MRN: [id number]  Date of Service: 7/15/2022    Discharge Recommendations:  Patient would benefit from continued therapy after discharge   PT Equipment Recommendations  Equipment Needed: No  Other: Pt unsafe to amb any distance without skilled assistance. Patient Diagnosis(es): The encounter diagnosis was Bradycardia. Past Medical History:  has a past medical history of Acute CVA (cerebrovascular accident) ([address] Acute on chronic combined systolic (congestive) and diastolic (congestive) heart failure (HCC), Arthritis, Asthma, Bradycardia, ESRD (end stage renal disease) ([address] Essential hypertension, Hallucinations, Hard of hearing, Head contusion, Iron deficiency anemia, unspecified, Meningioma ([address] Myocardial infarction ([address] Pure hypercholesterolemia, Type 2 diabetes mellitus with stage 4 chronic kidney disease, without long-term current use of insulin ([facility] [address] Unspecified venous (peripheral) insufficiency, and Unspecified vitamin D deficiency. Past Surgical History:  has a past surgical history that includes Tonsillectomy and adenoidectomy; Coronary artery bypass graft; intubation (3/7/2022); Thoracentesis (3/10/2022); Upper gastrointestinal endoscopy (N/A, 3/15/2022); Colonoscopy (N/A, 3/15/2022); IR NONTUNNELED VASCULAR CATHETER > 5 YEARS (2022); and IR TUNNELED CVC PLACE WO SQ PORT/PUMP > 5 YEARS (2022). Assessment   Body Structures, Functions, Activity Limitations Requiring Skilled Therapeutic Intervention: Decreased functional mobility ; Decreased strength;Decreased endurance;Decreased balance  Assessment: Pt required modA for bed mobility along with increased time and effort to complete. Pt sat EOB x 20 minutes with CGA to maintain with excessive fwd flexed posture. Pt performed STS transfer x3 with modA Using the SS and demos a standing tolerance of 30 seconds on each attempt. Pt requires frequent verbal cues for sequencing t/o session. Pt is limited by decreased balance, decreased BLE strength, and decreased endurance. Pt is currently unsafe to return to prior living arrangements due to high fall risk and would benefit from continued PT following discharge to address functional deficits. Therapy Prognosis: Good  Decision Making: High Complexity  Requires PT Follow-Up: Yes  Activity Tolerance  Activity Tolerance: Patient limited by fatigue;Patient limited by endurance  Activity Tolerance Comments: c/o fear of falling, pt reassured throughout. SpO2 remained WNL t/o session. Plan   Plan  Plan:  (6x/wk)  Current Treatment Recommendations: Strengthening, Balance training, Functional mobility training, Transfer training, Endurance training, Gait training, Stair training, Home exercise program, Safety education & training, Patient/Caregiver education & training, Equipment evaluation, education, & procurement  Safety Devices  Type of Devices: Call light within reach, Nurse notified, Gait belt, Patient at risk for falls, Heels elevated for pressure relief, Bed alarm in place, Left in bed  Restraints  Restraints Initially in Place: No     Restrictions  Restrictions/Precautions  Restrictions/Precautions: Fall Risk, Up as Tolerated  Required Braces or Orthoses?: No  Position Activity Restriction  Other position/activity restrictions: 6/28 emergent intubation and non-tunnelled HD catheter. extubated 7/6 at [address]  Chart Reviewed: Yes  Response To Previous Treatment: Patient with no complaints from previous session. Family / Caregiver Present: No  Follows Commands: Within Functional Limits  General Comment  Comments: Pt retired to supine with call light. RN notified. Bed alarm activated. Subjective  Subjective: RN and pt agreeable to PT. Pt supine upon arrival.  Pt pleasant and cooperative t/o session. Pt has no c/o pain.             Cognition   Orientation  Overall Orientation Status: Impaired  Orientation Level: Oriented to place;Oriented to person;Disoriented to situation  Cognition  Overall Cognitive Status: Exceptions  Following Commands: Follows one step commands consistently  Attention Span: Attends with cues to redirect  Cognition Comment: Pt requires increased time     Objective   Bed mobility  Supine to Sit: Moderate assistance  Sit to Supine: Moderate assistance  Scooting: Moderate assistance  Transfers  Sit to Stand: Moderate Assistance  Stand to sit: Minimal Assistance        Balance  Posture: Poor  Sitting - Static: Fair;+  Sitting - Dynamic: Fair  Standing - Static: Poor;+  Comments: Assessed with SS in standing  A/AROM Exercises:  Ankle pumps x20 BLE, SLR x10 BLE, LAQs x10 BLE               AM-PAC Score  AM-PAC Inpatient Mobility Raw Score : 9 (07/15/22 1153)  AM-PAC Inpatient T-Scale Score : 30.55 (07/15/22 1153)  Mobility Inpatient CMS 0-100% Score: 81.38 (07/15/22 1153)  Mobility Inpatient CMS G-Code Modifier : CM (07/15/22 1153)          Goals  Short Term Goals  Time Frame for Short term goals: 14 visits  Short term goal 1: Pt will be CGA mobility  Short term goal 2: Pt will be CGA transfers  Short term goal 3: Pt will be MehdiSAHARA mancera 50' RW  Short term goal 4: Pt will navigate 4 steps Mehdi dorado         Therapy Time   Individual Concurrent Group Co-treatment   Time In 0930         Time Out 1011         Minutes 41         Timed Code Treatment Minutes: Dara Gamboa 1490, PTA

## 2022-07-15 NOTE — PROGRESS NOTES
Physical Therapy  Facility/Department: [facility] CAR 2  Daily Treatment Note     Name: Dylan Taylor  :   MRN: [id number]  Date of Service: 7/15/2022    Discharge Recommendations:  Patient would benefit from continued therapy after discharge   PT Equipment Recommendations  Equipment Needed: No  Other: Pt unsafe to amb any distance without skilled assistance. Patient Diagnosis(es): The encounter diagnosis was Bradycardia. Past Medical History:  has a past medical history of Acute CVA (cerebrovascular accident) ([address] Acute on chronic combined systolic (congestive) and diastolic (congestive) heart failure (HCC), Arthritis, Asthma, Bradycardia, ESRD (end stage renal disease) ([address] Essential hypertension, Hallucinations, Hard of hearing, Head contusion, Iron deficiency anemia, unspecified, Meningioma ([address] Myocardial infarction ([facility] [address] Pure hypercholesterolemia, Type 2 diabetes mellitus with stage 4 chronic kidney disease, without long-term current use of insulin ([facility] [address] Unspecified venous (peripheral) insufficiency, and Unspecified vitamin D deficiency. Past Surgical History:  has a past surgical history that includes Tonsillectomy and adenoidectomy; Coronary artery bypass graft; intubation (3/7/2022); Thoracentesis (3/10/2022); Upper gastrointestinal endoscopy (N/A, 3/15/2022); Colonoscopy (N/A, 3/15/2022); IR NONTUNNELED VASCULAR CATHETER > 5 YEARS (2022); and IR TUNNELED CVC PLACE WO SQ PORT/PUMP > 5 YEARS (2022). Assessment   Body Structures, Functions, Activity Limitations Requiring Skilled Therapeutic Intervention: Decreased functional mobility ; Decreased strength;Decreased endurance;Decreased balance  Assessment: Pt required modA for bed mobility along with increased time and effort to complete. Pt sat EOB x 15 minutes with CGA to maintain with excessive fwd flexed posture. Pt performed STS transfer x2 with mmaxA using a RW and demos a standing tolerance of 30 seconds on each attempt. Pt requires frequent verbal cues for sequencing t/o session. Pt is limited by decreased balance, decreased BLE strength, and decreased endurance. Pt is currently unsafe to return to prior living arrangements due to high fall risk and would benefit from continued PT following discharge to address functional deficits. Therapy Prognosis: Good  Decision Making: High Complexity  Requires PT Follow-Up: Yes  Activity Tolerance  Activity Tolerance: Patient limited by fatigue;Patient limited by endurance  Activity Tolerance Comments: c/o fear of falling, pt reassured throughout. SpO2 remained WNL t/o session. Plan   Plan  Plan:  (6x/wk)  Current Treatment Recommendations: Strengthening, Balance training, Functional mobility training, Transfer training, Endurance training, Gait training, Stair training, Home exercise program, Safety education & training, Patient/Caregiver education & training, Equipment evaluation, education, & procurement  Safety Devices  Type of Devices: Call light within reach, Nurse notified, Gait belt, Patient at risk for falls, Heels elevated for pressure relief, Bed alarm in place, Left in bed  Restraints  Restraints Initially in Place: No     Restrictions  Restrictions/Precautions  Restrictions/Precautions: Fall Risk, Up as Tolerated  Required Braces or Orthoses?: No  Position Activity Restriction  Other position/activity restrictions: 6/28 emergent intubation and non-tunnelled HD catheter. extubated 7/6 at [address]  Chart Reviewed: Yes  Response To Previous Treatment: Patient with no complaints from previous session. Family / Caregiver Present: No  Follows Commands: Within Functional Limits  General Comment  Comments: Pt retired to supine with call light. RN notified. Bed alarm activated. Subjective  Subjective: RN and pt agreeable to PT. Pt supine upon arrival.  Pt pleasant and cooperative t/o session. Pt has no c/o pain.             Cognition   Orientation  Overall Orientation Status: Impaired  Orientation Level: Oriented to place;Oriented to person;Disoriented to situation  Cognition  Overall Cognitive Status: Exceptions  Following Commands: Follows one step commands consistently  Attention Span: Attends with cues to redirect  Cognition Comment: Pt requires increased time     Objective   Bed mobility  Supine to Sit: Moderate assistance  Sit to Supine: Moderate assistance  Scooting: Moderate assistance  Bed Mobility Comments: HOB elevated to complete with increased time and effort. Transfers  Sit to Stand: Maximum Assistance  Stand to sit: Moderate Assistance        Balance  Posture: Poor  Sitting - Static: Fair;+  Sitting - Dynamic: Fair  Standing - Static: Poor;+  Standing - Dynamic: Poor  Comments: Assessed with RW in standing  A/AROM Exercises:  Ankle pumps x20 BLE, LAQs x10 BLE          AM-PAC Score  AM-PAC Inpatient Mobility Raw Score : 9 (07/15/22 1535)  AM-PAC Inpatient T-Scale Score : 30.55 (07/15/22 1535)  Mobility Inpatient CMS 0-100% Score: 81.38 (07/15/22 1535)  Mobility Inpatient CMS G-Code Modifier : CM (07/15/22 1535)          Goals  Short Term Goals  Time Frame for Short term goals: 14 visits  Short term goal 1: Pt will be CGA mobility  Short term goal 2: Pt will be CGA transfers  Short term goal 3: Pt will be Mehdi amb 50' RW  Short term goal 4: Pt will navigate 4 steps Mehdi dorado       Therapy Time   Individual Concurrent Group Co-treatment   Time In 1435         Time Out 1458         Minutes 23         Timed Code Treatment Minutes: Stephanie 17, PTA

## 2022-07-16 LAB
ABSOLUTE EOS #: 0.1 K/UL (ref 0–0.44)
ABSOLUTE IMMATURE GRANULOCYTE: 0.12 K/UL (ref 0–0.3)
ABSOLUTE LYMPH #: 1.16 K/UL (ref 1.1–3.7)
ABSOLUTE MONO #: 0.7 K/UL (ref 0.1–1.2)
ANION GAP SERPL CALCULATED.3IONS-SCNC: 8 MMOL/L (ref 9–17)
BASOPHILS # BLD: 1 % (ref 0–2)
BASOPHILS ABSOLUTE: 0.06 K/UL (ref 0–0.2)
BUN BLDV-MCNC: 36 MG/DL (ref 8–23)
CALCIUM SERPL-MCNC: 7.6 MG/DL (ref 8.6–10.4)
CHLORIDE BLD-SCNC: 102 MMOL/L (ref 98–107)
CO2: 29 MMOL/L (ref 20–31)
CREAT SERPL-MCNC: 2.45 MG/DL (ref 0.5–0.9)
EOSINOPHILS RELATIVE PERCENT: 2 % (ref 1–4)
GFR AFRICAN AMERICAN: 23 ML/MIN
GFR NON-AFRICAN AMERICAN: 19 ML/MIN
GFR SERPL CREATININE-BSD FRML MDRD: ABNORMAL ML/MIN/{1.73_M2}
GLUCOSE BLD-MCNC: 101 MG/DL (ref 70–99)
GLUCOSE BLD-MCNC: 120 MG/DL (ref 65–105)
GLUCOSE BLD-MCNC: 125 MG/DL (ref 65–105)
HCT VFR BLD CALC: 24.1 % (ref 36.3–47.1)
HEMOGLOBIN: 7.1 G/DL (ref 11.9–15.1)
IMMATURE GRANULOCYTES: 2 %
LYMPHOCYTES # BLD: 18 % (ref 24–43)
MCH RBC QN AUTO: 30.1 PG (ref 25.2–33.5)
MCHC RBC AUTO-ENTMCNC: 29.5 G/DL (ref 28.4–34.8)
MCV RBC AUTO: 102.1 FL (ref 82.6–102.9)
MONOCYTES # BLD: 11 % (ref 3–12)
NRBC AUTOMATED: 0 PER 100 WBC
PDW BLD-RTO: 16.4 % (ref 11.8–14.4)
PLATELET # BLD: 248 K/UL (ref 138–453)
PMV BLD AUTO: 9.7 FL (ref 8.1–13.5)
POTASSIUM SERPL-SCNC: 3.9 MMOL/L (ref 3.7–5.3)
RBC # BLD: 2.36 M/UL (ref 3.95–5.11)
RBC # BLD: ABNORMAL 10*6/UL
SEG NEUTROPHILS: 66 % (ref 36–65)
SEGMENTED NEUTROPHILS ABSOLUTE COUNT: 4.19 K/UL (ref 1.5–8.1)
SODIUM BLD-SCNC: 139 MMOL/L (ref 135–144)
WBC # BLD: 6.3 K/UL (ref 3.5–11.3)

## 2022-07-16 PROCEDURE — 85025 COMPLETE CBC W/AUTO DIFF WBC: CPT

## 2022-07-16 PROCEDURE — 6370000000 HC RX 637 (ALT 250 FOR IP): Performed by: INTERNAL MEDICINE

## 2022-07-16 PROCEDURE — 6360000002 HC RX W HCPCS: Performed by: INTERNAL MEDICINE

## 2022-07-16 PROCEDURE — 2060000000 HC ICU INTERMEDIATE R&B

## 2022-07-16 PROCEDURE — 6370000000 HC RX 637 (ALT 250 FOR IP): Performed by: STUDENT IN AN ORGANIZED HEALTH CARE EDUCATION/TRAINING PROGRAM

## 2022-07-16 PROCEDURE — 6370000000 HC RX 637 (ALT 250 FOR IP): Performed by: FAMILY MEDICINE

## 2022-07-16 PROCEDURE — 82947 ASSAY GLUCOSE BLOOD QUANT: CPT

## 2022-07-16 PROCEDURE — 99231 SBSQ HOSP IP/OBS SF/LOW 25: CPT | Performed by: INTERNAL MEDICINE

## 2022-07-16 PROCEDURE — 99232 SBSQ HOSP IP/OBS MODERATE 35: CPT | Performed by: INTERNAL MEDICINE

## 2022-07-16 PROCEDURE — 90935 HEMODIALYSIS ONE EVALUATION: CPT

## 2022-07-16 PROCEDURE — 36415 COLL VENOUS BLD VENIPUNCTURE: CPT

## 2022-07-16 PROCEDURE — 80048 BASIC METABOLIC PNL TOTAL CA: CPT

## 2022-07-16 RX ADMIN — GUAIFENESIN 600 MG: 600 TABLET, EXTENDED RELEASE ORAL at 09:30

## 2022-07-16 RX ADMIN — POLYETHYLENE GLYCOL 3350 17 G: 17 POWDER, FOR SOLUTION ORAL at 22:07

## 2022-07-16 RX ADMIN — HEPARIN SODIUM 1600 UNITS: 1000 INJECTION INTRAVENOUS; SUBCUTANEOUS at 13:40

## 2022-07-16 RX ADMIN — EPOETIN ALFA-EPBX 5000 UNITS: 10000 INJECTION, SOLUTION INTRAVENOUS; SUBCUTANEOUS at 17:10

## 2022-07-16 RX ADMIN — AMIODARONE HYDROCHLORIDE 100 MG: 200 TABLET ORAL at 09:30

## 2022-07-16 RX ADMIN — MAGNESIUM GLUCONATE 500 MG ORAL TABLET 400 MG: 500 TABLET ORAL at 22:04

## 2022-07-16 RX ADMIN — QUETIAPINE FUMARATE 25 MG: 25 TABLET ORAL at 22:04

## 2022-07-16 RX ADMIN — FERROUS SULFATE TAB EC 325 MG (65 MG FE EQUIVALENT) 325 MG: 325 (65 FE) TABLET DELAYED RESPONSE at 14:07

## 2022-07-16 RX ADMIN — Medication 81 MG: at 09:31

## 2022-07-16 RX ADMIN — APIXABAN 5 MG: 5 TABLET, FILM COATED ORAL at 09:31

## 2022-07-16 RX ADMIN — APIXABAN 5 MG: 5 TABLET, FILM COATED ORAL at 22:03

## 2022-07-16 RX ADMIN — ANTI-FUNGAL POWDER MICONAZOLE NITRATE TALC FREE: 1.42 POWDER TOPICAL at 09:31

## 2022-07-16 RX ADMIN — METOPROLOL TARTRATE 25 MG: 25 TABLET ORAL at 22:04

## 2022-07-16 RX ADMIN — ANTI-FUNGAL POWDER MICONAZOLE NITRATE TALC FREE: 1.42 POWDER TOPICAL at 22:07

## 2022-07-16 RX ADMIN — ATORVASTATIN CALCIUM 40 MG: 80 TABLET, FILM COATED ORAL at 09:30

## 2022-07-16 RX ADMIN — PANTOPRAZOLE SODIUM 40 MG: 40 TABLET, DELAYED RELEASE ORAL at 09:30

## 2022-07-16 RX ADMIN — LEVOTHYROXINE SODIUM 25 MCG: 25 TABLET ORAL at 09:30

## 2022-07-16 RX ADMIN — MAGNESIUM GLUCONATE 500 MG ORAL TABLET 400 MG: 500 TABLET ORAL at 09:31

## 2022-07-16 RX ADMIN — GUAIFENESIN 600 MG: 600 TABLET, EXTENDED RELEASE ORAL at 22:07

## 2022-07-16 RX ADMIN — POLYETHYLENE GLYCOL 3350 17 G: 17 POWDER, FOR SOLUTION ORAL at 09:30

## 2022-07-16 ASSESSMENT — PAIN SCALES - GENERAL
PAINLEVEL_OUTOF10: 0

## 2022-07-16 NOTE — PROGRESS NOTES
Renal Progress Note    Patient :  Dylan Taylor; 66 y.o. MRN# [id number]  Location:  2019/2019-01  Attending:  Sisto Lesch Blood, DO  Admit Date:  6/27/2022   Hospital Day: 19      Subjective: Following for JOY/ATN initiated on dialysis this admission. Admitted with hypoxia and bradycardia requiring intubation. Extubated 7/6/22. Tunnel placed 7/8/22. Awaiting placement. Previous history reviewed known history of chronic kidney disease stage IV baseline 2.5-2.7 proteinuria about 16 g. Presented to the hospital with weakness and fatigue. Developed septic shock required intubation. Tunnel catheter thereafter placed on dialysis continues. Urine output has been suboptimal not showing signs of renal recovery. For all practical purposes patient will be classified as ESRD  Patient was seen and examined. No new issues reported overnight. Patient did have dialysis today ran for 210 minutes and got about 2.3 L removed. Patient still exhibits positive weakness/fatigue. Currently getting dialysis as per TTS schedule.  trying to find outpatient hemodialysis spot and ECF.     Outpatient Medications:     Medications Prior to Admission: ferrous sulfate (IRON 325) 325 (65 Fe) MG tablet, Take 1 tablet by mouth daily (with breakfast)  [DISCONTINUED] apixaban (ELIQUIS) 2.5 MG TABS tablet, Take 1 tablet by mouth 2 times daily  [DISCONTINUED] apixaban (ELIQUIS) 2.5 MG TABS tablet, Take 1 tablet by mouth 2 times daily  [DISCONTINUED] ferrous sulfate (IRON 325) 325 (65 Fe) MG tablet, Take 1 tablet by mouth daily (with breakfast)  [DISCONTINUED] fluticasone-salmeterol (ADVAIR HFA) 115-21 MCG/ACT inhaler, Inhale 2 puffs into the lungs 2 times daily  metoprolol tartrate (LOPRESSOR) 25 MG tablet, Take 1 tablet by mouth 2 times daily  aspirin EC 81 MG EC tablet, Take 1 tablet by mouth daily  albuterol sulfate HFA (PROAIR HFA) 108 (90 Base) MCG/ACT inhaler, Inhale 2 puffs into the lungs every 6 hours as needed for Wheezing or Shortness of Breath (cough)  fluticasone-salmeterol (ADVAIR HFA) 115-21 MCG/ACT inhaler, Inhale 2 puffs into the lungs 2 times daily Maintenance inhaler  [DISCONTINUED] hydrALAZINE (APRESOLINE) 25 MG tablet, Take 1 tablet by mouth 3 times daily  [DISCONTINUED] albuterol sulfate HFA (PROAIR HFA) 108 (90 Base) MCG/ACT inhaler, Inhale 2 puffs into the lungs every 6 hours as needed for Wheezing or Shortness of Breath (cough)  [DISCONTINUED] metoprolol tartrate (LOPRESSOR) 25 MG tablet, Take 1 tablet by mouth 2 times daily  [DISCONTINUED] aspirin EC 81 MG EC tablet, Take 1 tablet by mouth daily  [DISCONTINUED] hydrALAZINE (APRESOLINE) 25 MG tablet, Take 1 tablet by mouth 3 times daily  amLODIPine (NORVASC) 10 MG tablet, Take 1 tablet by mouth nightly  Blood Pressure KIT, Diagnosis: HTN. Needs to check blood pressure 1-2 times a day until stable, then once a day. Goal blood pressure less than 135/85, and above 110/60. vitamin D (ERGOCALCIFEROL) 1.25 MG (55426 UT) CAPS capsule, Take 1 capsule by mouth once a week Sundays  [DISCONTINUED] amiodarone (CORDARONE) 200 MG tablet, Take 1 tablet by mouth daily  Nebulizers (COMPRESSOR/NEBULIZER) MISC, Nebulizer with compressor. Disposable Med Nebs 2 /month. Reusable Med Nebs 1 per 6 months. Aerosol Mask 1 per month. Replacement Filters 2 per month. All other related supplies as needed per month. Medications being used: Albuterol . 083 unit dose vial. Frequency: every 6 hrs x 4/day . Length of Need: 1 (Patient not taking: Reported on 6/17/2022)  Misc.  Devices (ADJUST FOLD CANE/YORK HANDLE) MISC, Use daily for walking  Handicap Placard MISC, by Does not apply route Expires on 12/30/25  [DISCONTINUED] albuterol (PROVENTIL) (2.5 MG/3ML) 0.083% nebulizer solution, Take 3 mLs by nebulization every 6 hours as needed for Wheezing or Shortness of Breath (Patient not taking: Reported on 6/17/2022)  atorvastatin (LIPITOR) 40 MG tablet, Take 1 tablet by mouth once daily  desloratadine (CLARINEX) 5 MG tablet, Take 1 tablet by mouth once daily  FreeStyle Lancets MISC, 1 each by Does not apply route daily Patient needs to contact office before any further refills will be approved  magnesium oxide (MAG-OX) 400 (241.3 Mg) MG TABS tablet, Take 1 tablet by mouth twice daily  miconazole (MICOTIN) 2 % powder, Apply topically 2 times daily UNDER THE SKIN FOLDS LONG TERM  Multiple Vitamins-Minerals (THERAPEUTIC MULTIVITAMIN-MINERALS) tablet, Take 1 tablet by mouth daily  blood glucose test strips (FREESTYLE LITE) strip, 1 each by In Vitro route daily As needed.   Blood Pressure KIT, 1 kit by Does not apply route three times daily (Patient not taking: Reported on 6/17/2022)  blood glucose monitor kit and supplies, 1 kit by Other route three times daily Dispense Butterfly Elite CBG Device    Current Medications:     Scheduled Meds:    QUEtiapine  25 mg Oral Nightly    guaiFENesin  600 mg Oral BID    polyethylene glycol  17 g Oral BID    pantoprazole  40 mg Oral QAM AC    apixaban  5 mg Oral BID    metoprolol tartrate  25 mg Oral BID    epoetin rick-epbx  5,000 Units IntraVENous Q MWF    levothyroxine  25 mcg Oral Daily    amiodarone  100 mg Oral Daily    aspirin EC  81 mg Oral Daily    atorvastatin  40 mg Oral Daily    magnesium oxide  400 mg Oral BID    miconazole   Topical BID    ferrous sulfate  325 mg Oral Daily with breakfast     Continuous Infusions:    sodium chloride      sodium chloride      sodium chloride      dextrose      sodium chloride 75 mL/hr at 07/06/22 2000     PRN Meds:  albuterol, haloperidol lactate, heparin (porcine), heparin (porcine), lidocaine, nitroglycerin, bisacodyl, sodium chloride, albumin human, sodium chloride flush, sodium chloride, sodium chloride, fentanNYL, glucose, dextrose bolus **OR** dextrose bolus, glucagon (rDNA), dextrose, sodium chloride flush, sodium chloride, ondansetron **OR** ondansetron, acetaminophen **OR** acetaminophen, morphine    Input/Output:       No intake/output data recorded. .      Patient Vitals for the past 96 hrs (Last 3 readings):   Weight   22 1345 133 lb 9.6 oz (60.6 kg)   22 1000 137 lb 12.6 oz (62.5 kg)   07/15/22 0600 139 lb 15.9 oz (63.5 kg)         Vital Signs:   Temperature:  Temp: 97.9 °F (36.6 °C)  TMax:   Temp (24hrs), Av.1 °F (36.7 °C), Min:97.5 °F (36.4 °C), Max:98.8 °F (37.1 °C)    Respirations:  Resp: 16  Pulse:   Heart Rate: (!) 108  BP:    BP: (!) 150/84  BP Range: Systolic (91QQK), ZUQ:263 , Min:135 , BRQ:833       Diastolic (52QXC), FMN:82, Min:49, Max:91      Physical Examination:     General:  AAO x 3, speaking in full sentences, no accessory muscle use. HEENT: Atraumatic, normocephalic, no throat congestion, moist mucosa. Eyes:   Pupils equal, round and reactive to light, EOMI. Neck:   Supple  Chest:   Diminished   Cardiac:  S1 S2 irregular, no murmurs, gallops or rubs. Abdomen: Soft, non-tender, no masses or organomegaly, BS audible. :   No suprapubic or flank tenderness. Neuro:   AAO x 3, No FND. SKIN:  No rashes, good skin turgor. Extremities:  1+ edema bilateral lower extremities.     Labs:       Recent Labs     22  0531 07/15/22  0708 22  0600   WBC 7.2 8.3 6.3   RBC 2.62* 2.70* 2.36*   HGB 7.8* 8.2* 7.1*   HCT 27.0* 27.7* 24.1*   .1* 102.6 102.1   MCH 29.8 30.4 30.1   MCHC 28.9 29.6 29.5   RDW 16.7* 16.9* 16.4*    404 248   MPV 9.3 10.0 9.7        BMP:   Recent Labs     22  0531 07/15/22  0708 22  0600    135 139   K 4.1 3.8 3.9    100 102   CO2 24 27 29   BUN 32* 19 36*   CREATININE 3.06* 1.90* 2.45*   GLUCOSE 102* 93 101*   CALCIUM 8.2* 7.8* 7.6*       YRIS:      Lab Results   Component Value Date/Time    YRIS NEGATIVE 10/27/2020 04:37 PM     SPEP:  Lab Results   Component Value Date/Time    PROT 5.3 2022 02:59 PM    ALBCAL 2.5 2022 03:00 PM    ALBPCT 48 2022 03:00 PM    LABALPH 0.3 2022 03:00 PM    LABALPH 1.2 2022 03:00 PM A1PCT 6 06/01/2022 03:00 PM    A2PCT 23 06/01/2022 03:00 PM    LABBETA 0.7 06/01/2022 03:00 PM    BETAPCT 13 06/01/2022 03:00 PM    GAMGLOB 0.5 06/01/2022 03:00 PM    GGPCT 10 06/01/2022 03:00 PM    PATH ELECTRONICALLY SIGNED. Seble Charlton M.D. 06/01/2022 03:00 PM    PATH ELECTRONICALLY SIGNED. Seble Charlton M.D. 06/01/2022 03:00 PM     UPEP:     Lab Results   Component Value Date/Time    LABPE  10/27/2020 04:35 PM     ELEVATED PROTEIN CONCENTRATION. MOST SERUM PROTEINS ARE DETECTED IN THIS URINE.   USUALLY OBSERVED WITH MARKEDLY INCREASED NON-SELECTIVE GLOMERULAR PERMEABILITY (i.e. SEVERE GLOMERULAR DISEASE), CONTAMINATION OF     C3:     Lab Results   Component Value Date/Time    C3 147 10/27/2020 04:37 PM     C4:     Lab Results   Component Value Date/Time    C4 29 10/27/2020 04:37 PM     MPO ANCA:     Lab Results   Component Value Date/Time    MPO 5 10/27/2020 04:37 PM     PR3 ANCA:     Lab Results   Component Value Date/Time    PR3 16 10/27/2020 04:37 PM     Hep BsAg:         Lab Results   Component Value Date/Time    HEPBSAG NONREACTIVE 07/14/2022 02:22 PM     Hep C AB:          Lab Results   Component Value Date/Time    HEPCAB NONREACTIVE 06/28/2022 02:56 PM       Urinalysis/Chemistries:      Lab Results   Component Value Date/Time    NITRU NEGATIVE 06/27/2022 06:35 PM    COLORU Dark Yellow 06/27/2022 06:35 PM    PHUR 5.5 06/27/2022 06:35 PM    WBCUA 20 TO 50 06/27/2022 06:35 PM    RBCUA 20 TO 50 06/27/2022 06:35 PM    MUCUS 1+ 06/27/2022 06:35 PM    TRICHOMONAS NOT REPORTED 02/07/2022 02:42 AM    YEAST NOT REPORTED 02/07/2022 02:42 AM    BACTERIA FEW 06/12/2022 09:33 PM    SPECGRAV 1.030 06/27/2022 06:35 PM    LEUKOCYTESUR SMALL 06/27/2022 06:35 PM    UROBILINOGEN Normal 06/27/2022 06:35 PM    BILIRUBINUR NEGATIVE 06/27/2022 06:35 PM    GLUCOSEU 1+ 06/27/2022 06:35 PM    KETUA TRACE 06/27/2022 06:35 PM    AMORPHOUS NOT REPORTED 02/07/2022 02:42 AM     Urine Sodium:     Lab Results   Component Value Date/Time    SLOAN <20 06/08/2022 09:52 PM      Urine Creatinine:     Lab Results   Component Value Date/Time    LABCREA 63.0 06/08/2022 09:52 PM     Radiology:     Reviewed. Assessment:     JOY currently HD dependent, likely secondary to ATN from hypotension requiring pressor support vs progression from underlying diabetic nephropathy, getting dialysis as per TTS schedule. Hemodialysis initiated on 6/28/2022. Right IJ tunneled cath placed 7/8/22. Given pre-existing kidney disease and proteinuria close to 16 g likelihood of renal recovery slim. For all intents and purposes she should be classified as ESRD  Hyperkalemia likely from progressive decline in renal function, now improved with dialysis. Nephrotic range proteinuria likely due to diabetic nephropathy. Proteinuria about 16 g  Diabetes type 2. Extubated 7/6/22. Nonhealing lower extremity ulcers. Hypotension did require pressor support this admission. Fluid overload  Drop in hemoglobin stable with no overt signs of bleeding. Plan:   Hemodialysis today, continue TTS schedule monitor strict I/Os and renal function for signs of improvement. BMP in AM.   working towards setting up outpatient dialysis spot, needs an outpatient spot and also placement  Will follow. Nutrition   Please ensure that patient is on a renal diet/TF. Avoid nephrotoxic drugs/contrast exposure. This note is created with the assistance of a speech-recognition program. While intending to generate a document that actually reflects the content of the visit, no guarantees can be provided that every mistake has been identified and corrected by editing.

## 2022-07-16 NOTE — PROGRESS NOTES
[facility]  Office: [address], DO, Neri Messina, DO, Cristina Hudson, DO, Giselle Hercules Blood, DO, Krista Dawson MD, Brandy Zhou MD, Rory Tejeda MD, Sharath Oliver MD, Gianfranco Johnson MD, Toan Jhaveri MD, Steven Shah MD, Merissa Sun, DO, Yulissa Mejía MD,  Maryam Glynn, DO, Americo Carias MD, Wood Rivers MD, Yuli Xiong DO, Fermin King MD, Teresa Sanabria MD, Marcela Womack, DO, Bethany Jones MD, Marianne Lai MD, Diana Frausto, [facility], [facility], [facility], [facility], [facility], Keila Carias, CNP, Taiwo Gomez, CNP, Darrius Ivy, CNP, Karen Jackson, PAToryC, April Sutton, [facility], Norbert Ashford, CNP, Koko Sol, CNP, Johanny Medeiros, CNP, Becki Kendall, CNS, Reymundo Sinha, [facility], Kassandra Pisano, CNP, Ondina Teague, CNP, Humaira Rizvi, [address] [place] [address]    Progress Note    7/16/2022    3:59 PM    Name:   Abhijeet Mendes  MRN:     [id number]     Acct:      [de-identified]   Room:   2019/2019-01  IP Day:  23  Admit Date:  6/27/2022 12:28 PM    PCP:   Sweta Danielle MD  Code Status:  Full Code    Subjective:     C/C:   Chief Complaint   Patient presents with    Bradycardia     Interval History Status: improved. Feels good  Denies complaints and wants to go home    confused    Brief History:     Per my partner:  \"66year-old with prior history of CKD stage IV( baseline creatinine 1.8-2), chronic diastolic CHF, hypertension, CAD s/p CABG in 2003 and occluded SVG to OM 2 and cath in 2006, COPD, type 2 diabetes, A. fib on amiodarone and Eliquis  Presented to ED on 6/27/2021 with complaints of shortness of breath hypoxia improved on 90s, and fatigue. Was found to be bradycardic heart rate in 35 by EMS and was placed on transcutaneous pacing and received atropine. She was subsequently started on epinephrine drip and admitted to the ICU. X-ray consistent with pulmonary edema.   Cardiology was consulted and switched to dopamine drip, and Eliquis was switched to heparin WBP. Was also noted to be hyperkalemic and started on hemodialysis sec to worsening CKD on 06/28/22. Neurology was consulted for encephalopathy : noted to have right anterior temporal fossa meningioma with possible edema. MRI considered nonemergent and deferred.,  ID was consulted for leukocytosis and b/l LE wounds which are chronic([facility] wound care clinic outpatient) and Right gluteal /coccygeal ulcerations. Started on Cefepime.  -On 6/28/2022 she had progressively worsening mentation with worsening hypoxia and hypercapnia she was intubated and started on HD. Heparin was held on 6/29-07/01 secondary to oozing at right IJ dialysis catheter site. Patient was continued on empiric antibiotics through 7/1/22(urine and blood cx NG), dialysis/UF and subsequently extubated on 07/06/22. Received 1u PRBC 7/7/22 for hb 7.0\"    Review of Systems:     Constitutional:  negative for chills, fevers, sweats  Respiratory:  negative for cough, dyspnea on exertion, shortness of breath, wheezing  Cardiovascular:  negative for chest pain, chest pressure/discomfort, lower extremity edema, palpitations  Gastrointestinal:  negative for abdominal pain, constipation, diarrhea, nausea, vomiting  Neurological:  negative for dizziness, headache    Medications: Allergies:     Allergies   Allergen Reactions    Latex Rash    Aleve [Naproxen Sodium]      Chronic kidney disease stage III, CHF    Pioglitazone Other (See Comments)     Congestive heart failure    Claritin [Loratadine]     Keflex [Cephalexin]     Lisinopril      Needs clarification of contraindication       Current Meds:   Scheduled Meds:    QUEtiapine  25 mg Oral Nightly    guaiFENesin  600 mg Oral BID    polyethylene glycol  17 g Oral BID    pantoprazole  40 mg Oral QAM AC    apixaban  5 mg Oral BID    metoprolol tartrate  25 mg Oral BID    epoetin rick-epbx  5,000 Units IntraVENous Q MWF levothyroxine  25 mcg Oral Daily    amiodarone  100 mg Oral Daily    aspirin EC  81 mg Oral Daily    atorvastatin  40 mg Oral Daily    magnesium oxide  400 mg Oral BID    miconazole   Topical BID    ferrous sulfate  325 mg Oral Daily with breakfast     Continuous Infusions:    sodium chloride      sodium chloride      sodium chloride      dextrose      sodium chloride 75 mL/hr at 07/06/22 2000     PRN Meds: albuterol, haloperidol lactate, heparin (porcine), heparin (porcine), lidocaine, nitroglycerin, bisacodyl, sodium chloride, albumin human, sodium chloride flush, sodium chloride, sodium chloride, fentanNYL, glucose, dextrose bolus **OR** dextrose bolus, glucagon (rDNA), dextrose, sodium chloride flush, sodium chloride, ondansetron **OR** ondansetron, acetaminophen **OR** acetaminophen, morphine    Data:     Past Medical History:   has a past medical history of Acute CVA (cerebrovascular accident) ([facility] Acute on chronic combined systolic (congestive) and diastolic (congestive) heart failure ([facility] Arthritis, Asthma, Bradycardia, ESRD (end stage renal disease) ([facility] Essential hypertension, Hallucinations, Hard of hearing, Head contusion, Iron deficiency anemia, unspecified, Meningioma ([facility] [address] Myocardial infarction ([facility] [address] [facility] Pure hypercholesterolemia, Type 2 diabetes mellitus with stage 4 chronic kidney disease, without long-term current use of insulin ([facility] Unspecified venous (peripheral) insufficiency, and Unspecified vitamin D deficiency. Social History:   reports that she quit smoking about 22 years ago. She has a 2.50 pack-year smoking history. She has never used smokeless tobacco. She reports that she does not currently use alcohol. She reports that she does not use drugs.      Family History:   Family History   Problem Relation Age of Onset    Diabetes Mother     Heart Disease Mother     Heart Disease Father     Diabetes Sister     High Blood Pressure Sister     Diabetes Maternal Grandmother Vitals:  BP (!) 150/84   Pulse (!) 108   Temp 97.9 °F (36.6 °C) (Oral)   Resp 16   Ht 4' 11\" (1.499 m)   Wt 133 lb 9.6 oz (60.6 kg)   SpO2 94%   BMI 26.98 kg/m²   Temp (24hrs), Av.1 °F (36.7 °C), Min:97.5 °F (36.4 °C), Max:98.8 °F (37.1 °C)    Recent Labs     07/15/22  1106 07/15/22  1627 07/15/22  2004 07/16/22  1406   POCGLU 90 125* 150* 125*       I/O (24Hr):     Intake/Output Summary (Last 24 hours) at 2022 1559  Last data filed at 2022 1345  Gross per 24 hour   Intake 200 ml   Output 2220 ml   Net -2020 ml       Labs:  Hematology:  Recent Labs     22  0531 07/15/22  0722  0600   WBC 7.2 8.3 6.3   RBC 2.62* 2.70* 2.36*   HGB 7.8* 8.2* 7.1*   HCT 27.0* 27.7* 24.1*   .1* 102.6 102.1   MCH 29.8 30.4 30.1   MCHC 28.9 29.6 29.5   RDW 16.7* 16.9* 16.4*    404 248   MPV 9.3 10.0 9.7     Chemistry:  Recent Labs     22  0531 07/15/22  0722  0600    135 139   K 4.1 3.8 3.9    100 102   CO2 24 27 29   GLUCOSE 102* 93 101*   BUN 32* 19 36*   CREATININE 3.06* 1.90* 2.45*   ANIONGAP 9 8* 8*   LABGLOM 15* 26* 19*   GFRAA 18* 31* 23*   CALCIUM 8.2* 7.8* 7.6*     Recent Labs     22  1557 07/15/22  0645 07/15/22  1106 07/15/22  1627 07/15/22  2004 07/16/22  1406   POCGLU 109* 84 90 125* 150* 125*     ABG:  Lab Results   Component Value Date/Time    POCPH 7.411 2022 04:40 AM    PHART 7.459 2022 04:28 AM    POCPCO2 44.8 2022 04:40 AM    DUF3TIX 39.9 2022 04:28 AM    POCPO2 92.6 2022 04:40 AM    PO2ART 107.0 2022 04:28 AM    POCHCO3 28.5 2022 04:40 AM    YYN2LNF 28.3 2022 04:28 AM    NBEA 5 2022 05:36 PM    PBEA 4 2022 04:40 AM    RIXX1VZE 97 2022 04:40 AM    W5FRKJHE 97.4 2022 04:28 AM    FIO2 30.0 2022 04:40 AM     Lab Results   Component Value Date/Time    SPECIAL NOT GIVEN 2022 01:11 PM     Lab Results   Component Value Date/Time    CULTURE NO GROWTH 5 DAYS 07/09/2022 05:12 AM    CULTURE NO GROWTH 5 DAYS 07/09/2022 05:12 AM       Radiology:  XR CHEST (SINGLE VIEW FRONTAL)    Result Date: 7/14/2022  Stable findings of CHF. XR CHEST PORTABLE    Result Date: 7/11/2022  Findings are compatible with pulmonary edema. Bilateral pleural effusions. FL MODIFIED BARIUM SWALLOW W VIDEO    Result Date: 7/10/2022  1. Trace flash penetration without aspiration with the thin liquid substance by straw. 2. No penetration or aspiration with the remainder of the administered substances. Please see separate speech pathology report for full discussion of findings and recommendations.        Physical Examination:        General appearance:  alert, cooperative and no distress  Mental Status:  oriented to person and normal affect  Lungs:  clear to auscultation bilaterally, normal effort  Heart:  regular rate and rhythm, no murmur  Abdomen:  soft, nontender, nondistended, normal bowel sounds, no masses, hepatomegaly, splenomegaly  Extremities:  no edema, redness, tenderness in the calves  Skin:  no gross lesions, rashes, induration    Assessment:        Hospital Problems             Last Modified POA    * (Principal) Toxic metabolic encephalopathy 2/84/7329 Yes    Lymphedema 6/28/2022 Yes    Bradycardia 7/15/2022 Yes    Hyperkalemia 7/15/2022 Yes    Shock ([address] 7/15/2022 Yes    Acute respiratory failure with hypoxia and hypercapnia (HCC) 6/28/2022 Yes    Pulmonary edema cardiac cause ([address] 6/28/2022 Yes    Dependence on renal dialysis ([address] 7/5/2022 Yes    Nephrotic range proteinuria 7/5/2022 Yes    External hemorrhoid 7/10/2022 Yes    Dysphagia 7/10/2022 Yes    Debility 7/12/2022 Yes    Sepsis ([address] 7/12/2022 Yes    Arterial hypotension 7/14/2022 Yes    Acute on chronic diastolic congestive heart failure ([address] 6/28/2022 Yes    JOY (acute kidney injury) ([address] 7/15/2022 Yes        Plan:        Cont same treatment plan  Remains medically stable for discharge-having issues with snf placement  D/w daughter    Karl Cummings, DO  7/16/2022  3:59 PM

## 2022-07-16 NOTE — PROGRESS NOTES
Dialysis Post Treatment Note  Vitals:    07/16/22 1408   BP: 150/84   Pulse:  108   Resp: 18   Temp: 97.9 °F (36.6 °C)   SpO2:      Pre-Weight = 62.5 kg  Post-weight = Weight: 133 lb 9.6 oz (60.6 kg)  Total Liters Processed = Blood Volume Processed (Liters): 77.1 l/min  Rinseback Volume (mL) = Rinseback Volume (ml): 300 ml  Net Removal (mL) =  2020 mL  Patient's dry weight= TBD  Type of access used= CVC  Length of treatment= 210 minutes    Pt tolerated tx well with stable vitals and pressure but stated throughout she was scared and anxious. 2L removed w/o issue.

## 2022-07-17 PROBLEM — N17.0 ACUTE RENAL FAILURE WITH TUBULAR NECROSIS (HCC): Status: ACTIVE | Noted: 2022-06-08

## 2022-07-17 LAB
ABSOLUTE EOS #: 0.07 K/UL (ref 0–0.44)
ABSOLUTE IMMATURE GRANULOCYTE: 0.14 K/UL (ref 0–0.3)
ABSOLUTE LYMPH #: 1.17 K/UL (ref 1.1–3.7)
ABSOLUTE MONO #: 0.83 K/UL (ref 0.1–1.2)
ANION GAP SERPL CALCULATED.3IONS-SCNC: 7 MMOL/L (ref 9–17)
BASOPHILS # BLD: 1 % (ref 0–2)
BASOPHILS ABSOLUTE: 0.07 K/UL (ref 0–0.2)
BUN BLDV-MCNC: 30 MG/DL (ref 8–23)
CALCIUM SERPL-MCNC: 7.4 MG/DL (ref 8.6–10.4)
CHLORIDE BLD-SCNC: 103 MMOL/L (ref 98–107)
CO2: 31 MMOL/L (ref 20–31)
CREAT SERPL-MCNC: 1.73 MG/DL (ref 0.5–0.9)
EOSINOPHILS RELATIVE PERCENT: 1 % (ref 1–4)
GFR AFRICAN AMERICAN: 35 ML/MIN
GFR NON-AFRICAN AMERICAN: 29 ML/MIN
GFR SERPL CREATININE-BSD FRML MDRD: ABNORMAL ML/MIN/{1.73_M2}
GLUCOSE BLD-MCNC: 113 MG/DL (ref 65–105)
GLUCOSE BLD-MCNC: 113 MG/DL (ref 65–105)
GLUCOSE BLD-MCNC: 123 MG/DL (ref 65–105)
GLUCOSE BLD-MCNC: 92 MG/DL (ref 65–105)
GLUCOSE BLD-MCNC: 99 MG/DL (ref 70–99)
HCT VFR BLD CALC: 22.4 % (ref 36.3–47.1)
HCT VFR BLD CALC: 23.6 % (ref 36.3–47.1)
HEMOGLOBIN: 6.3 G/DL (ref 11.9–15.1)
HEMOGLOBIN: 7.2 G/DL (ref 11.9–15.1)
IMMATURE GRANULOCYTES: 2 %
LYMPHOCYTES # BLD: 17 % (ref 24–43)
MCH RBC QN AUTO: 29.6 PG (ref 25.2–33.5)
MCHC RBC AUTO-ENTMCNC: 28.1 G/DL (ref 28.4–34.8)
MCV RBC AUTO: 105.2 FL (ref 82.6–102.9)
MONOCYTES # BLD: 12 % (ref 3–12)
MORPHOLOGY: ABNORMAL
MORPHOLOGY: ABNORMAL
NRBC AUTOMATED: 0 PER 100 WBC
PDW BLD-RTO: 16.3 % (ref 11.8–14.4)
PLATELET # BLD: 241 K/UL (ref 138–453)
PMV BLD AUTO: 9.7 FL (ref 8.1–13.5)
POTASSIUM SERPL-SCNC: 3.7 MMOL/L (ref 3.7–5.3)
RBC # BLD: 2.13 M/UL (ref 3.95–5.11)
SEG NEUTROPHILS: 67 % (ref 36–65)
SEGMENTED NEUTROPHILS ABSOLUTE COUNT: 4.62 K/UL (ref 1.5–8.1)
SODIUM BLD-SCNC: 141 MMOL/L (ref 135–144)
WBC # BLD: 6.9 K/UL (ref 3.5–11.3)

## 2022-07-17 PROCEDURE — 6370000000 HC RX 637 (ALT 250 FOR IP): Performed by: INTERNAL MEDICINE

## 2022-07-17 PROCEDURE — 85014 HEMATOCRIT: CPT

## 2022-07-17 PROCEDURE — 80048 BASIC METABOLIC PNL TOTAL CA: CPT

## 2022-07-17 PROCEDURE — 6370000000 HC RX 637 (ALT 250 FOR IP): Performed by: STUDENT IN AN ORGANIZED HEALTH CARE EDUCATION/TRAINING PROGRAM

## 2022-07-17 PROCEDURE — 86850 RBC ANTIBODY SCREEN: CPT

## 2022-07-17 PROCEDURE — 99232 SBSQ HOSP IP/OBS MODERATE 35: CPT | Performed by: INTERNAL MEDICINE

## 2022-07-17 PROCEDURE — 86920 COMPATIBILITY TEST SPIN: CPT

## 2022-07-17 PROCEDURE — 86870 RBC ANTIBODY IDENTIFICATION: CPT

## 2022-07-17 PROCEDURE — 82947 ASSAY GLUCOSE BLOOD QUANT: CPT

## 2022-07-17 PROCEDURE — 97530 THERAPEUTIC ACTIVITIES: CPT

## 2022-07-17 PROCEDURE — 86900 BLOOD TYPING SEROLOGIC ABO: CPT

## 2022-07-17 PROCEDURE — 6360000002 HC RX W HCPCS: Performed by: INTERNAL MEDICINE

## 2022-07-17 PROCEDURE — 2060000000 HC ICU INTERMEDIATE R&B

## 2022-07-17 PROCEDURE — 86880 COOMBS TEST DIRECT: CPT

## 2022-07-17 PROCEDURE — 36430 TRANSFUSION BLD/BLD COMPNT: CPT

## 2022-07-17 PROCEDURE — 6370000000 HC RX 637 (ALT 250 FOR IP): Performed by: FAMILY MEDICINE

## 2022-07-17 PROCEDURE — 97110 THERAPEUTIC EXERCISES: CPT

## 2022-07-17 PROCEDURE — 85018 HEMOGLOBIN: CPT

## 2022-07-17 PROCEDURE — 36415 COLL VENOUS BLD VENIPUNCTURE: CPT

## 2022-07-17 PROCEDURE — 86901 BLOOD TYPING SEROLOGIC RH(D): CPT

## 2022-07-17 PROCEDURE — 85025 COMPLETE CBC W/AUTO DIFF WBC: CPT

## 2022-07-17 PROCEDURE — P9040 RBC LEUKOREDUCED IRRADIATED: HCPCS

## 2022-07-17 RX ORDER — ASCORBIC ACID 500 MG
500 TABLET ORAL 2 TIMES DAILY
Status: DISCONTINUED | OUTPATIENT
Start: 2022-07-17 | End: 2022-07-18 | Stop reason: HOSPADM

## 2022-07-17 RX ORDER — SODIUM CHLORIDE 9 MG/ML
INJECTION, SOLUTION INTRAVENOUS PRN
Status: DISCONTINUED | OUTPATIENT
Start: 2022-07-17 | End: 2022-07-18 | Stop reason: HOSPADM

## 2022-07-17 RX ORDER — LANOLIN ALCOHOL/MO/W.PET/CERES
325 CREAM (GRAM) TOPICAL 2 TIMES DAILY WITH MEALS
Status: DISCONTINUED | OUTPATIENT
Start: 2022-07-17 | End: 2022-07-18 | Stop reason: HOSPADM

## 2022-07-17 RX ADMIN — APIXABAN 5 MG: 5 TABLET, FILM COATED ORAL at 09:05

## 2022-07-17 RX ADMIN — METOPROLOL TARTRATE 25 MG: 25 TABLET ORAL at 09:05

## 2022-07-17 RX ADMIN — METOPROLOL TARTRATE 25 MG: 25 TABLET ORAL at 20:50

## 2022-07-17 RX ADMIN — OXYCODONE HYDROCHLORIDE AND ACETAMINOPHEN 500 MG: 500 TABLET ORAL at 20:50

## 2022-07-17 RX ADMIN — ANTI-FUNGAL POWDER MICONAZOLE NITRATE TALC FREE: 1.42 POWDER TOPICAL at 20:52

## 2022-07-17 RX ADMIN — POLYETHYLENE GLYCOL 3350 17 G: 17 POWDER, FOR SOLUTION ORAL at 09:12

## 2022-07-17 RX ADMIN — PANTOPRAZOLE SODIUM 40 MG: 40 TABLET, DELAYED RELEASE ORAL at 09:05

## 2022-07-17 RX ADMIN — HALOPERIDOL LACTATE 1 MG: 5 INJECTION, SOLUTION INTRAMUSCULAR at 01:15

## 2022-07-17 RX ADMIN — ANTI-FUNGAL POWDER MICONAZOLE NITRATE TALC FREE: 1.42 POWDER TOPICAL at 09:11

## 2022-07-17 RX ADMIN — MAGNESIUM GLUCONATE 500 MG ORAL TABLET 400 MG: 500 TABLET ORAL at 09:05

## 2022-07-17 RX ADMIN — LEVOTHYROXINE SODIUM 25 MCG: 25 TABLET ORAL at 09:05

## 2022-07-17 RX ADMIN — AMIODARONE HYDROCHLORIDE 100 MG: 200 TABLET ORAL at 09:05

## 2022-07-17 RX ADMIN — GUAIFENESIN 600 MG: 600 TABLET, EXTENDED RELEASE ORAL at 20:50

## 2022-07-17 RX ADMIN — APIXABAN 5 MG: 5 TABLET, FILM COATED ORAL at 20:50

## 2022-07-17 RX ADMIN — ATORVASTATIN CALCIUM 40 MG: 80 TABLET, FILM COATED ORAL at 09:05

## 2022-07-17 RX ADMIN — POLYETHYLENE GLYCOL 3350 17 G: 17 POWDER, FOR SOLUTION ORAL at 20:57

## 2022-07-17 RX ADMIN — MAGNESIUM GLUCONATE 500 MG ORAL TABLET 400 MG: 500 TABLET ORAL at 20:50

## 2022-07-17 RX ADMIN — GUAIFENESIN 600 MG: 600 TABLET, EXTENDED RELEASE ORAL at 09:05

## 2022-07-17 RX ADMIN — Medication 81 MG: at 09:05

## 2022-07-17 RX ADMIN — FERROUS SULFATE TAB EC 325 MG (65 MG FE EQUIVALENT) 325 MG: 325 (65 FE) TABLET DELAYED RESPONSE at 16:47

## 2022-07-17 RX ADMIN — QUETIAPINE FUMARATE 25 MG: 25 TABLET ORAL at 20:50

## 2022-07-17 NOTE — PROGRESS NOTES
Renal Progress Note    Patient :  Tip Zheng; 66 y.o. MRN# [id number]  Location:  2019/2019-01  Attending:  Ender Cummings DO  Admit Date:  6/27/2022   Hospital Day: 20      Subjective: Following for JOY/ATN initiated on dialysis this admission. Admitted with hypoxia and bradycardia requiring intubation. Extubated 7/6/22. Tunnel placed 7/8/22. Awaiting placement. Previous history reviewed known history of chronic kidney disease stage IV baseline 2.0-2.2 proteinuria about 16 g. Poor muscle mass. Presented to the hospital with weakness and fatigue. Developed septic shock required intubation. Tunnel catheter thereafter placed and dialysis continues. Urine output has been suboptimal not showing signs of renal recovery. For all practical purposes patient will be classified as ESRD    Patient was seen and examined. No significant overnight issues, her hgb did again drop to 6.3 this am, no overt blood loss. Patient did have dialysis yesterday ran for 210 minutes 2.3 L removed. She is fatigued but otherwise \"not too bad\". Currently getting dialysis as per TTS schedule.  trying to find outpatient hemodialysis spot and ECF.     Outpatient Medications:     Medications Prior to Admission: ferrous sulfate (IRON 325) 325 (65 Fe) MG tablet, Take 1 tablet by mouth daily (with breakfast)  [DISCONTINUED] apixaban (ELIQUIS) 2.5 MG TABS tablet, Take 1 tablet by mouth 2 times daily  [DISCONTINUED] apixaban (ELIQUIS) 2.5 MG TABS tablet, Take 1 tablet by mouth 2 times daily  [DISCONTINUED] ferrous sulfate (IRON 325) 325 (65 Fe) MG tablet, Take 1 tablet by mouth daily (with breakfast)  [DISCONTINUED] fluticasone-salmeterol (ADVAIR HFA) 115-21 MCG/ACT inhaler, Inhale 2 puffs into the lungs 2 times daily  metoprolol tartrate (LOPRESSOR) 25 MG tablet, Take 1 tablet by mouth 2 times daily  aspirin EC 81 MG EC tablet, Take 1 tablet by mouth daily  albuterol sulfate HFA (PROAIR HFA) 108 (90 Base) MCG/ACT inhaler, Inhale 2 puffs into the lungs every 6 hours as needed for Wheezing or Shortness of Breath (cough)  fluticasone-salmeterol (ADVAIR HFA) 115-21 MCG/ACT inhaler, Inhale 2 puffs into the lungs 2 times daily Maintenance inhaler  [DISCONTINUED] hydrALAZINE (APRESOLINE) 25 MG tablet, Take 1 tablet by mouth 3 times daily  [DISCONTINUED] albuterol sulfate HFA (PROAIR HFA) 108 (90 Base) MCG/ACT inhaler, Inhale 2 puffs into the lungs every 6 hours as needed for Wheezing or Shortness of Breath (cough)  [DISCONTINUED] metoprolol tartrate (LOPRESSOR) 25 MG tablet, Take 1 tablet by mouth 2 times daily  [DISCONTINUED] aspirin EC 81 MG EC tablet, Take 1 tablet by mouth daily  [DISCONTINUED] hydrALAZINE (APRESOLINE) 25 MG tablet, Take 1 tablet by mouth 3 times daily  amLODIPine (NORVASC) 10 MG tablet, Take 1 tablet by mouth nightly  Blood Pressure KIT, Diagnosis: HTN. Needs to check blood pressure 1-2 times a day until stable, then once a day. Goal blood pressure less than 135/85, and above 110/60. vitamin D (ERGOCALCIFEROL) 1.25 MG (72482 UT) CAPS capsule, Take 1 capsule by mouth once a week Sundays  [DISCONTINUED] amiodarone (CORDARONE) 200 MG tablet, Take 1 tablet by mouth daily  Nebulizers (COMPRESSOR/NEBULIZER) MISC, Nebulizer with compressor. Disposable Med Nebs 2 /month. Reusable Med Nebs 1 per 6 months. Aerosol Mask 1 per month. Replacement Filters 2 per month. All other related supplies as needed per month. Medications being used: Albuterol . 083 unit dose vial. Frequency: every 6 hrs x 4/day . Length of Need: 1 (Patient not taking: Reported on 6/17/2022)  Misc.  Devices (ADJUST FOLD CANE/YORK HANDLE) MISC, Use daily for walking  Handicap Placard MISC, by Does not apply route Expires on 12/30/25  [DISCONTINUED] albuterol (PROVENTIL) (2.5 MG/3ML) 0.083% nebulizer solution, Take 3 mLs by nebulization every 6 hours as needed for Wheezing or Shortness of Breath (Patient not taking: Reported on 6/17/2022)  atorvastatin (LIPITOR) 40 MG tablet, Take 1 tablet by mouth once daily  desloratadine (CLARINEX) 5 MG tablet, Take 1 tablet by mouth once daily  FreeStyle Lancets MISC, 1 each by Does not apply route daily Patient needs to contact office before any further refills will be approved  magnesium oxide (MAG-OX) 400 (241.3 Mg) MG TABS tablet, Take 1 tablet by mouth twice daily  miconazole (MICOTIN) 2 % powder, Apply topically 2 times daily UNDER THE SKIN FOLDS LONG TERM  Multiple Vitamins-Minerals (THERAPEUTIC MULTIVITAMIN-MINERALS) tablet, Take 1 tablet by mouth daily  blood glucose test strips (FREESTYLE LITE) strip, 1 each by In Vitro route daily As needed.   Blood Pressure KIT, 1 kit by Does not apply route three times daily (Patient not taking: Reported on 6/17/2022)  blood glucose monitor kit and supplies, 1 kit by Other route three times daily Dispense Butterfly Elite CBG Device    Current Medications:     Scheduled Meds:    ferrous sulfate  325 mg Oral BID WC    ascorbic acid  500 mg Oral BID    QUEtiapine  25 mg Oral Nightly    guaiFENesin  600 mg Oral BID    polyethylene glycol  17 g Oral BID    pantoprazole  40 mg Oral QAM AC    apixaban  5 mg Oral BID    metoprolol tartrate  25 mg Oral BID    epoetin rick-epbx  5,000 Units IntraVENous Q MWF    levothyroxine  25 mcg Oral Daily    amiodarone  100 mg Oral Daily    aspirin EC  81 mg Oral Daily    atorvastatin  40 mg Oral Daily    magnesium oxide  400 mg Oral BID    miconazole   Topical BID     Continuous Infusions:    sodium chloride      sodium chloride      sodium chloride      sodium chloride      dextrose      sodium chloride 75 mL/hr at 07/06/22 2000     PRN Meds:  sodium chloride, albuterol, haloperidol lactate, heparin (porcine), heparin (porcine), lidocaine, nitroglycerin, bisacodyl, sodium chloride, albumin human, sodium chloride flush, sodium chloride, sodium chloride, fentanNYL, glucose, dextrose bolus **OR** dextrose bolus, glucagon (rDNA), dextrose, sodium chloride 06/27/2022 02:59 PM    ALBCAL 2.5 06/01/2022 03:00 PM    ALBPCT 48 06/01/2022 03:00 PM    LABALPH 0.3 06/01/2022 03:00 PM    LABALPH 1.2 06/01/2022 03:00 PM    A1PCT 6 06/01/2022 03:00 PM    A2PCT 23 06/01/2022 03:00 PM    LABBETA 0.7 06/01/2022 03:00 PM    BETAPCT 13 06/01/2022 03:00 PM    GAMGLOB 0.5 06/01/2022 03:00 PM    GGPCT 10 06/01/2022 03:00 PM    PATH ELECTRONICALLY SIGNED. Sherlyn Rma M.D. 06/01/2022 03:00 PM    PATH ELECTRONICALLY SIGNED. Sherlyn Ram M.D. 06/01/2022 03:00 PM     UPEP:     Lab Results   Component Value Date/Time    LABPE  10/27/2020 04:35 PM     ELEVATED PROTEIN CONCENTRATION. MOST SERUM PROTEINS ARE DETECTED IN THIS URINE.   USUALLY OBSERVED WITH MARKEDLY INCREASED NON-SELECTIVE GLOMERULAR PERMEABILITY (i.e. SEVERE GLOMERULAR DISEASE), CONTAMINATION OF     C3:     Lab Results   Component Value Date/Time    C3 147 10/27/2020 04:37 PM     C4:     Lab Results   Component Value Date/Time    C4 29 10/27/2020 04:37 PM     MPO ANCA:     Lab Results   Component Value Date/Time    MPO 5 10/27/2020 04:37 PM     PR3 ANCA:     Lab Results   Component Value Date/Time    PR3 16 10/27/2020 04:37 PM     Hep BsAg:         Lab Results   Component Value Date/Time    HEPBSAG NONREACTIVE 07/14/2022 02:22 PM     Hep C AB:          Lab Results   Component Value Date/Time    HEPCAB NONREACTIVE 06/28/2022 02:56 PM       Urinalysis/Chemistries:      Lab Results   Component Value Date/Time    NITRU NEGATIVE 06/27/2022 06:35 PM    COLORU Dark Yellow 06/27/2022 06:35 PM    PHUR 5.5 06/27/2022 06:35 PM    WBCUA 20 TO 50 06/27/2022 06:35 PM    RBCUA 20 TO 50 06/27/2022 06:35 PM    MUCUS 1+ 06/27/2022 06:35 PM    TRICHOMONAS NOT REPORTED 02/07/2022 02:42 AM    YEAST NOT REPORTED 02/07/2022 02:42 AM    BACTERIA FEW 06/12/2022 09:33 PM    SPECGRAV 1.030 06/27/2022 06:35 PM    LEUKOCYTESUR SMALL 06/27/2022 06:35 PM    UROBILINOGEN Normal 06/27/2022 06:35 PM    BILIRUBINUR NEGATIVE 06/27/2022 06:35 PM GLUCOSEU 1+ 06/27/2022 06:35 PM    KETUA TRACE 06/27/2022 06:35 PM    AMORPHOUS NOT REPORTED 02/07/2022 02:42 AM     Urine Sodium:     Lab Results   Component Value Date/Time    SLOAN <20 06/08/2022 09:52 PM      Urine Creatinine:     Lab Results   Component Value Date/Time    LABCREA 63.0 06/08/2022 09:52 PM     Radiology:     Reviewed. Assessment:     JOY currently HD dependent, likely secondary to ATN from hypotension requiring pressor support vs progression from underlying diabetic nephropathy, getting dialysis as per TTS schedule. Hemodialysis initiated on 6/28/2022. Right IJ tunneled cath placed 7/8/22. Given pre-existing kidney disease and proteinuria close to 16 g likelihood of renal recovery slim. For all intents and purposes she should be classified as ESRD. K today is 3.7. Hyperkalemia likely from progressive decline in renal function, now improved with dialysis. Nephrotic range proteinuria likely due to diabetic nephropathy. Proteinuria about 16 g  Diabetes type 2. Extubated 7/6/22. Nonhealing lower extremity ulcers. Hypotension did require pressor support this admission. Fluid overload  Drop in hemoglobin again today to 6.3, no overt bleeding signs. Will get blood today. Plan:   Hemodialysis today, continue TTS schedule monitor strict I/Os and renal function for signs of improvement. BMP in AM.   working towards setting up outpatient dialysis spot, needs an outpatient spot and also placement  Will follow. Will discuss with Dr. Yuliet Posey. Nutrition   Please ensure that patient is on a renal diet/TF. Avoid nephrotoxic drugs/contrast exposure. Leeann Finley, ROCÍO  [facility]    History reviewed. Known history of advanced kidney disease baseline creatinine 2.0-2.2 proteinuria about 16 g. Admitted to the hospital for hypoxia and bradycardia, developed shock, required intubation. Thereafter renal placement therapy initiated.   Tunnel catheter placed

## 2022-07-17 NOTE — PROGRESS NOTES
flexed posture. Pt performed STS transfer x4 with sammie phillips a standing tolerance of 30 seconds on each attempt. Pt requires frequent verbal cues for sequencing t/o session. Pt is limited by decreased balance, decreased BLE strength, and decreased endurance. Pt is currently unsafe to return to prior living arrangements due to high fall risk and would benefit from continued PT following discharge to address functional deficits. Therapy Prognosis: Good  Requires PT Follow-Up: Yes  Activity Tolerance  Activity Tolerance: Patient limited by fatigue;Patient limited by endurance     Plan   Plan  Plan:  (6x/wk)  Current Treatment Recommendations: Strengthening, Balance training, Functional mobility training, Transfer training, Endurance training, Gait training, Stair training, Home exercise program, Safety education & training, Patient/Caregiver education & training, Equipment evaluation, education, & procurement  Safety Devices  Type of Devices: Call light within reach, Nurse notified, Gait belt, Patient at risk for falls, Heels elevated for pressure relief, Bed alarm in place, Left in bed  Restraints  Restraints Initially in Place: No     Restrictions  Restrictions/Precautions  Restrictions/Precautions: Fall Risk, Up as Tolerated  Required Braces or Orthoses?: No  Position Activity Restriction  Other position/activity restrictions: 6/28 emergent intubation and non-tunnelled HD catheter. extubated 7/6 at 1813     Subjective   Pain: Pt c/o bilat lower leg pain, does not rate. Pain does not limit treamtent  General  Chart Reviewed: Yes  Patient assessed for rehabilitation services?: Yes  Response To Previous Treatment: Patient with no complaints from previous session.   Family / Caregiver Present: Yes (son)  Follows Commands: Within Functional Limits  General Comment  Comments: Pt left in be with call light within reach, alarm activated and son present in room  Subjective  Subjective: Pt and RN agreeable to PT. Pt resting in bed upon arrival with son present. Very pleasant and cooperative with treatment, lethargic but very motivated to participate. Cognition   Orientation  Overall Orientation Status: Within Functional Limits  Cognition  Overall Cognitive Status: Exceptions  Following Commands: Follows one step commands consistently; Follows multistep commands with increased time; Follows multistep commands with repitition  Attention Span: Attends with cues to redirect (lethargic)  Memory: Decreased long term memory  Safety Judgement: Good awareness of safety precautions  Problem Solving: Assistance required to generate solutions  Insights: Fully aware of deficits  Initiation: Requires cues for some  Sequencing: Requires cues for some  Cognition Comment: Pt requires increased time     Objective   O2 Device: Nasal cannula     Observation/Palpation  Posture: Poor (kyphotic, forward head posture)  Observation: bilat lower legs wrapped from knees to ankles    Bed mobility  Supine to Sit: Moderate assistance  Sit to Supine: Moderate assistance  Scooting: Moderate assistance  Bed Mobility Comments: HOB elevated to complete with increased time and effort. Transfers  Sit to Stand: Minimal Assistance  Stand to sit: Minimal Assistance  Comment: STS transfer completed with sammie garcia pt demonstating good effort with transfers        Balance  Posture: Poor  Sitting - Static: Fair  Sitting - Dynamic: Fair  Standing - Static: Fair;-  Standing - Dynamic: Fair;-  Comments: standing balance assessed in sammie garcia pt able to maintain standing ~30 secs x4 trials  AROM Exercises:   Seated LE exercise program: Long Arc Quads, hip abduction/adduction, heel/toe raises, and marches. Reps: 10x   Supine Exercises: Heel Slides, Quad Sets.   Reps:  10x   Static Sitting Balance Exercises: EOB ~15 mins    AM-PAC Score  AM-PAC Inpatient Mobility Raw Score : 11 (07/17/22 1513)  AM-PAC Inpatient T-Scale Score : 33.86 (07/17/22 1513)  Mobility Inpatient CMS 0-100% Score: 72.57 (07/17/22 1513)  Mobility Inpatient CMS G-Code Modifier : CL (07/17/22 1513)    Goals  Short Term Goals  Time Frame for Short term goals: 14 visits  Short term goal 1: Pt will be CGA mobility  Short term goal 2: Pt will be CGA transfers  Short term goal 3: Pt will be Mehdi amb 50' RW  Short term goal 4: Pt will navigate 4 steps Mehdi R rail       Education  Patient Education  Education Given To: Patient  Education Provided: Role of Therapy;Plan of Care;Transfer Training  Education Method: Demonstration;Verbal  Barriers to Learning: None  Education Outcome: Verbalized understanding;Continued education needed      Therapy Time   Individual Concurrent Group Co-treatment   Time In 1010         Time Out 1054         Minutes 44         Timed Code Treatment Minutes: [address] [place], [place]

## 2022-07-17 NOTE — PLAN OF CARE
Problem: Discharge Planning  Goal: Discharge to home or other facility with appropriate resources  Outcome: Progressing     Problem: Chronic Conditions and Co-morbidities  Goal: Patient's chronic conditions and co-morbidity symptoms are monitored and maintained or improved  Outcome: Progressing     Problem: Pain  Goal: Verbalizes/displays adequate comfort level or baseline comfort level  Outcome: Progressing     Problem: Skin/Tissue Integrity  Goal: Absence of new skin breakdown  Description: 1. Monitor for areas of redness and/or skin breakdown  2. Assess vascular access sites hourly  3. Every 4-6 hours minimum:  Change oxygen saturation probe site  4. Every 4-6 hours:  If on nasal continuous positive airway pressure, respiratory therapy assess nares and determine need for appliance change or resting period.   Outcome: Progressing     Problem: Safety - Adult  Goal: Free from fall injury  Outcome: Progressing     Problem: ABCDS Injury Assessment  Goal: Absence of physical injury  Outcome: Progressing     Problem: Respiratory - Adult  Goal: Achieves optimal ventilation and oxygenation  Outcome: Progressing     Problem: Nutrition Deficit:  Goal: Optimize nutritional status  Outcome: Progressing

## 2022-07-17 NOTE — PROGRESS NOTES
consulted and switched to dopamine drip, and Eliquis was switched to heparin WBP. Was also noted to be hyperkalemic and started on hemodialysis sec to worsening CKD on 06/28/22. Neurology was consulted for encephalopathy : noted to have right anterior temporal fossa meningioma with possible edema. MRI considered nonemergent and deferred.,  ID was consulted for leukocytosis and b/l LE wounds which are chronic[place] [place] and Right gluteal /coccygeal ulcerations. Started on Cefepime.  -On 6/28/2022 she had progressively worsening mentation with worsening hypoxia and hypercapnia she was intubated and started on HD. Heparin was held on 6/29-07/01 secondary to oozing at right IJ dialysis catheter site. Patient was continued on empiric antibiotics through 7/1/22(urine and blood cx NG), dialysis/UF and subsequently extubated on 07/06/22. Received 1u PRBC 7/7/22 for hb 7.0\"    Review of Systems:     Constitutional:  negative for chills, fevers, sweats  Respiratory:  negative for cough, dyspnea on exertion, shortness of breath, wheezing  Cardiovascular:  negative for chest pain, chest pressure/discomfort, lower extremity edema, palpitations  Gastrointestinal:  negative for abdominal pain, constipation, diarrhea, nausea, vomiting  Neurological:  negative for dizziness, headache    Medications: Allergies:     Allergies   Allergen Reactions    Latex Rash    Aleve [Naproxen Sodium]      Chronic kidney disease stage III, CHF    Pioglitazone Other (See Comments)     Congestive heart failure    Claritin [Loratadine]     Keflex [Cephalexin]     Lisinopril      Needs clarification of contraindication       Current Meds:   Scheduled Meds:    QUEtiapine  25 mg Oral Nightly    guaiFENesin  600 mg Oral BID    polyethylene glycol  17 g Oral BID    pantoprazole  40 mg Oral QAM AC    apixaban  5 mg Oral BID    metoprolol tartrate  25 mg Oral BID    epoetin rick-epbx  5,000 Units IntraVENous Q MWF levothyroxine  25 mcg Oral Daily    amiodarone  100 mg Oral Daily    aspirin EC  81 mg Oral Daily    atorvastatin  40 mg Oral Daily    magnesium oxide  400 mg Oral BID    miconazole   Topical BID    ferrous sulfate  325 mg Oral Daily with breakfast     Continuous Infusions:    sodium chloride      sodium chloride      sodium chloride      dextrose      sodium chloride 75 mL/hr at 07/06/22 2000     PRN Meds: albuterol, haloperidol lactate, heparin (porcine), heparin (porcine), lidocaine, nitroglycerin, bisacodyl, sodium chloride, albumin human, sodium chloride flush, sodium chloride, sodium chloride, fentanNYL, glucose, dextrose bolus **OR** dextrose bolus, glucagon (rDNA), dextrose, sodium chloride flush, sodium chloride, ondansetron **OR** ondansetron, acetaminophen **OR** acetaminophen, morphine    Data:     Past Medical History:   has a past medical history of Acute CVA (cerebrovascular accident) ([facility] [address] Acute on chronic combined systolic (congestive) and diastolic (congestive) heart failure ([facility] [address] Arthritis, Asthma, Bradycardia, ESRD (end stage renal disease) ([facility] [address] Essential hypertension, Hallucinations, Hard of hearing, Head contusion, Iron deficiency anemia, unspecified, Meningioma ([facility] [address] Myocardial infarction ([facility] [address] Pure hypercholesterolemia, Type 2 diabetes mellitus with stage 4 chronic kidney disease, without long-term current use of insulin ([facility] [address] Unspecified venous (peripheral) insufficiency, and Unspecified vitamin D deficiency. Social History:   reports that she quit smoking about 22 years ago. She has a 2.50 pack-year smoking history. She has never used smokeless tobacco. She reports that she does not currently use alcohol. She reports that she does not use drugs.      Family History:   Family History   Problem Relation Age of Onset    Diabetes Mother     Heart Disease Mother     Heart Disease Father     Diabetes Sister     High Blood Pressure Sister     Diabetes Maternal Grandmother 07/09/2022 05:12 AM    CULTURE NO GROWTH 5 DAYS 07/09/2022 05:12 AM       Radiology:  XR CHEST (SINGLE VIEW FRONTAL)    Result Date: 7/14/2022  Stable findings of CHF. XR CHEST PORTABLE    Result Date: 7/11/2022  Findings are compatible with pulmonary edema. Bilateral pleural effusions. FL MODIFIED BARIUM SWALLOW W VIDEO    Result Date: 7/10/2022  1. Trace flash penetration without aspiration with the thin liquid substance by straw. 2. No penetration or aspiration with the remainder of the administered substances. Please see separate speech pathology report for full discussion of findings and recommendations.        Physical Examination:        General appearance:  alert, cooperative and no distress  Mental Status:  oriented to person and normal affect  Lungs:  clear to auscultation bilaterally, normal effort  Heart:  regular rate and rhythm, no murmur  Abdomen:  soft, nontender, nondistended, normal bowel sounds, no masses, hepatomegaly, splenomegaly  Extremities:  no edema, redness, tenderness in the calves  Skin:  no gross lesions, rashes, induration    Assessment:        Hospital Problems             Last Modified POA    * (Principal) Toxic metabolic encephalopathy 7/63/7097 Yes    Lymphedema 6/28/2022 Yes    Bradycardia 7/15/2022 Yes    Hyperkalemia 7/15/2022 Yes    Shock ([address] 7/15/2022 Yes    Acute respiratory failure with hypoxia and hypercapnia (HCC) 6/28/2022 Yes    Pulmonary edema cardiac cause ([address] 6/28/2022 Yes    Dependence on renal dialysis ([address] 7/5/2022 Yes    Nephrotic range proteinuria 7/5/2022 Yes    External hemorrhoid 7/10/2022 Yes    Dysphagia 7/10/2022 Yes    Debility 7/12/2022 Yes    Sepsis ([address] 7/12/2022 Yes    Arterial hypotension 7/14/2022 Yes    Acute on chronic diastolic congestive heart failure ([address] 6/28/2022 Yes    JOY (acute kidney injury) ([address] 7/15/2022 Yes        Plan:        Transfuse 1u prbc, otherwise cont same treatment plan  Needs outpatient vce as recommended by hematology; has already had recent egd and colonoscopy (both negative)  Increase iron to bid and add vit c to enhance absorption  Ready for discharge-having issues with snf placement  D/w son    Danyharjeet Robhelga Cummings DO  7/17/2022  10:44 AM

## 2022-07-18 VITALS
OXYGEN SATURATION: 97 % | BODY MASS INDEX: 26.93 KG/M2 | HEART RATE: 80 BPM | RESPIRATION RATE: 24 BRPM | WEIGHT: 133.6 LBS | DIASTOLIC BLOOD PRESSURE: 44 MMHG | TEMPERATURE: 98.2 F | SYSTOLIC BLOOD PRESSURE: 125 MMHG | HEIGHT: 59 IN

## 2022-07-18 LAB
ABO/RH: NORMAL
ABSOLUTE EOS #: 0.07 K/UL (ref 0–0.44)
ABSOLUTE IMMATURE GRANULOCYTE: 0.2 K/UL (ref 0–0.3)
ABSOLUTE LYMPH #: 1.41 K/UL (ref 1.1–3.7)
ABSOLUTE MONO #: 1.39 K/UL (ref 0.1–1.2)
ANION GAP SERPL CALCULATED.3IONS-SCNC: 10 MMOL/L (ref 9–17)
ANTIBODY IDENTIFICATION: NORMAL
ANTIBODY SCREEN: POSITIVE
ARM BAND NUMBER: NORMAL
BASOPHILS # BLD: 1 % (ref 0–2)
BASOPHILS ABSOLUTE: 0.09 K/UL (ref 0–0.2)
BLD PROD TYP BPU: NORMAL
BLOOD BANK BLOOD PRODUCT EXPIRATION DATE: NORMAL
BLOOD BANK ISBT PRODUCT BLOOD TYPE: 600
BLOOD BANK PRODUCT CODE: NORMAL
BLOOD BANK UNIT TYPE AND RH: NORMAL
BPU ID: NORMAL
BUN BLDV-MCNC: 62 MG/DL (ref 8–23)
CALCIUM SERPL-MCNC: 7.8 MG/DL (ref 8.6–10.4)
CHLORIDE BLD-SCNC: 103 MMOL/L (ref 98–107)
CO2: 28 MMOL/L (ref 20–31)
CREAT SERPL-MCNC: 2.24 MG/DL (ref 0.5–0.9)
CROSSMATCH RESULT: NORMAL
DAT IGG: NEGATIVE
DISPENSE STATUS BLOOD BANK: NORMAL
EOSINOPHILS RELATIVE PERCENT: 1 % (ref 1–4)
EXPIRATION DATE: NORMAL
GFR AFRICAN AMERICAN: 26 ML/MIN
GFR NON-AFRICAN AMERICAN: 21 ML/MIN
GFR SERPL CREATININE-BSD FRML MDRD: ABNORMAL ML/MIN/{1.73_M2}
GLUCOSE BLD-MCNC: 100 MG/DL (ref 65–105)
GLUCOSE BLD-MCNC: 131 MG/DL (ref 70–99)
GLUCOSE BLD-MCNC: 57 MG/DL (ref 65–105)
GLUCOSE BLD-MCNC: 86 MG/DL (ref 65–105)
HBV QNT LOG, IU/ML: NOT DETECTED LOG IU/ML
HBV QNT, IU/ML: NOT DETECTED IU/ML
HCT VFR BLD CALC: 26.1 % (ref 36.3–47.1)
HEMOGLOBIN: 7.5 G/DL (ref 11.9–15.1)
IMMATURE GRANULOCYTES: 2 %
INTERPRETATION: NOT DETECTED
LYMPHOCYTES # BLD: 14 % (ref 24–43)
MCH RBC QN AUTO: 29.3 PG (ref 25.2–33.5)
MCHC RBC AUTO-ENTMCNC: 28.7 G/DL (ref 28.4–34.8)
MCV RBC AUTO: 102 FL (ref 82.6–102.9)
MONOCYTES # BLD: 14 % (ref 3–12)
NRBC AUTOMATED: 0 PER 100 WBC
PDW BLD-RTO: 19.9 % (ref 11.8–14.4)
PLATELET # BLD: 210 K/UL (ref 138–453)
PMV BLD AUTO: 10 FL (ref 8.1–13.5)
POTASSIUM SERPL-SCNC: 4.5 MMOL/L (ref 3.7–5.3)
RBC # BLD: 2.56 M/UL (ref 3.95–5.11)
RBC # BLD: ABNORMAL 10*6/UL
SEG NEUTROPHILS: 69 % (ref 36–65)
SEGMENTED NEUTROPHILS ABSOLUTE COUNT: 7.13 K/UL (ref 1.5–8.1)
SODIUM BLD-SCNC: 141 MMOL/L (ref 135–144)
TRANSFUSION STATUS: NORMAL
UNIT DIVISION: 0
UNIT ISSUE DATE/TIME: NORMAL
WBC # BLD: 10.3 K/UL (ref 3.5–11.3)

## 2022-07-18 PROCEDURE — 6360000002 HC RX W HCPCS: Performed by: INTERNAL MEDICINE

## 2022-07-18 PROCEDURE — 6370000000 HC RX 637 (ALT 250 FOR IP): Performed by: INTERNAL MEDICINE

## 2022-07-18 PROCEDURE — 6370000000 HC RX 637 (ALT 250 FOR IP): Performed by: STUDENT IN AN ORGANIZED HEALTH CARE EDUCATION/TRAINING PROGRAM

## 2022-07-18 PROCEDURE — 97530 THERAPEUTIC ACTIVITIES: CPT

## 2022-07-18 PROCEDURE — 80048 BASIC METABOLIC PNL TOTAL CA: CPT

## 2022-07-18 PROCEDURE — 6370000000 HC RX 637 (ALT 250 FOR IP): Performed by: FAMILY MEDICINE

## 2022-07-18 PROCEDURE — 99239 HOSP IP/OBS DSCHRG MGMT >30: CPT | Performed by: INTERNAL MEDICINE

## 2022-07-18 PROCEDURE — 82947 ASSAY GLUCOSE BLOOD QUANT: CPT

## 2022-07-18 PROCEDURE — 85025 COMPLETE CBC W/AUTO DIFF WBC: CPT

## 2022-07-18 PROCEDURE — 36415 COLL VENOUS BLD VENIPUNCTURE: CPT

## 2022-07-18 PROCEDURE — 99232 SBSQ HOSP IP/OBS MODERATE 35: CPT | Performed by: INTERNAL MEDICINE

## 2022-07-18 PROCEDURE — 97110 THERAPEUTIC EXERCISES: CPT

## 2022-07-18 RX ORDER — QUETIAPINE FUMARATE 25 MG/1
25 TABLET, FILM COATED ORAL NIGHTLY
Qty: 30 TABLET | Refills: 0 | Status: SHIPPED | OUTPATIENT
Start: 2022-07-18

## 2022-07-18 RX ORDER — PANTOPRAZOLE SODIUM 40 MG/1
40 TABLET, DELAYED RELEASE ORAL
Qty: 30 TABLET | Refills: 3 | DISCHARGE
Start: 2022-07-19

## 2022-07-18 RX ORDER — FUROSEMIDE 80 MG
80 TABLET ORAL DAILY
Qty: 60 TABLET | Refills: 3 | DISCHARGE
Start: 2022-07-18

## 2022-07-18 RX ORDER — ASCORBIC ACID 500 MG
500 TABLET ORAL 2 TIMES DAILY
Qty: 30 TABLET | Refills: 3 | DISCHARGE
Start: 2022-07-18

## 2022-07-18 RX ORDER — FERROUS SULFATE 325(65) MG
325 TABLET ORAL 2 TIMES DAILY
Qty: 60 TABLET | Refills: 0 | DISCHARGE
Start: 2022-07-18

## 2022-07-18 RX ORDER — FUROSEMIDE 40 MG/1
80 TABLET ORAL DAILY
Status: DISCONTINUED | OUTPATIENT
Start: 2022-07-18 | End: 2022-07-18 | Stop reason: HOSPADM

## 2022-07-18 RX ADMIN — FUROSEMIDE 80 MG: 40 TABLET ORAL at 10:00

## 2022-07-18 RX ADMIN — APIXABAN 5 MG: 5 TABLET, FILM COATED ORAL at 09:53

## 2022-07-18 RX ADMIN — PANTOPRAZOLE SODIUM 40 MG: 40 TABLET, DELAYED RELEASE ORAL at 09:52

## 2022-07-18 RX ADMIN — FERROUS SULFATE TAB EC 325 MG (65 MG FE EQUIVALENT) 325 MG: 325 (65 FE) TABLET DELAYED RESPONSE at 09:53

## 2022-07-18 RX ADMIN — MAGNESIUM GLUCONATE 500 MG ORAL TABLET 400 MG: 500 TABLET ORAL at 09:53

## 2022-07-18 RX ADMIN — ATORVASTATIN CALCIUM 40 MG: 80 TABLET, FILM COATED ORAL at 09:53

## 2022-07-18 RX ADMIN — POLYETHYLENE GLYCOL 3350 17 G: 17 POWDER, FOR SOLUTION ORAL at 09:00

## 2022-07-18 RX ADMIN — AMIODARONE HYDROCHLORIDE 100 MG: 200 TABLET ORAL at 09:53

## 2022-07-18 RX ADMIN — GUAIFENESIN 600 MG: 600 TABLET, EXTENDED RELEASE ORAL at 09:53

## 2022-07-18 RX ADMIN — Medication 81 MG: at 09:53

## 2022-07-18 RX ADMIN — ANTI-FUNGAL POWDER MICONAZOLE NITRATE TALC FREE: 1.42 POWDER TOPICAL at 09:00

## 2022-07-18 RX ADMIN — OXYCODONE HYDROCHLORIDE AND ACETAMINOPHEN 500 MG: 500 TABLET ORAL at 09:53

## 2022-07-18 RX ADMIN — LEVOTHYROXINE SODIUM 25 MCG: 25 TABLET ORAL at 09:53

## 2022-07-18 RX ADMIN — METOPROLOL TARTRATE 25 MG: 25 TABLET ORAL at 09:53

## 2022-07-18 NOTE — CARE COORDINATION
KARLY following for OP HD needs. Patient discharging to [facility] with in-house HD.  SW notified [facility] with [address] Admissions to cancel referral.

## 2022-07-18 NOTE — PROGRESS NOTES
[facility]  Office: [address], DO, Manish Rivas, DO, Ross Perez, DO, Carmen Parker [facility], DO, Trista Cartwright MD, Conor Gamino MD, Lee Ann Gael MD, Harlan Dominguez MD, Gwendolyn Turpin MD, Ho Squires MD, Katiana Zaragoza MD, Enma Miller, DO, Neena Dutta MD,  Anika Winston, DO, Yoselin Reardon MD, Portillo Maciel MD, Soheila Fuentes, DO, Marci Potter MD, Rowan Suh MD, Nate Huertas, DO, Sydni Branham MD, Leanna Jolley MD, Ruth Denny, [facility], [facility], CNP, Kristin Kumar, CNP, Julio Beal, CNP, Gloria Griffin, CNP, Kaushik Bagley, CNP, TABATHA SolisC, Consuelo Molina, [facility], Gordo Villasenor, CNP, Bharat Crain, CNP, Claude Fisher, CNP, Aislinn Aponte, CNS, Baron Kurtz, [facility], Ottoniel Desouza, CNP, Paddy Eugene, CNP, Brenda Mercury, [address] [place] [address] 12    Progress Note    7/18/2022    12:07 PM    Name:   Jared Holland  MRN:     [id number]     Acct:      [de-identified]   Room:   2019/2019-01  IP Day:  21  Admit Date:  6/27/2022 12:28 PM    PCP:   Sera Welsh MD  Code Status:  Full Code    Subjective:     C/C:   Chief Complaint   Patient presents with    Bradycardia     Interval History Status: not changed. Feels good  Denies complaints and wants to go home    confused    Brief History:     Per my partner:  \"66year-old with prior history of CKD stage IV( baseline creatinine 1.8-2), chronic diastolic CHF, hypertension, CAD s/p CABG in 2003 and occluded SVG to OM 2 and cath in 2006, COPD, type 2 diabetes, A. fib on amiodarone and Eliquis  Presented to ED on 6/27/2021 with complaints of shortness of breath hypoxia improved on 90s, and fatigue. Was found to be bradycardic heart rate in 35 by EMS and was placed on transcutaneous pacing and received atropine. She was subsequently started on epinephrine drip and admitted to the ICU. X-ray consistent with pulmonary edema.   Cardiology was consulted and switched to dopamine drip, and Eliquis was switched to heparin WBP. Was also noted to be hyperkalemic and started on hemodialysis sec to worsening CKD on 06/28/22. Neurology was consulted for encephalopathy : noted to have right anterior temporal fossa meningioma with possible edema. MRI considered nonemergent and deferred.,  ID was consulted for leukocytosis and b/l LE wounds which are chronic([facility] wound care clinic outpatient) and Right gluteal /coccygeal ulcerations. Started on Cefepime.  -On 6/28/2022 she had progressively worsening mentation with worsening hypoxia and hypercapnia she was intubated and started on HD. Heparin was held on 6/29-07/01 secondary to oozing at right IJ dialysis catheter site. Patient was continued on empiric antibiotics through 7/1/22(urine and blood cx NG), dialysis/UF and subsequently extubated on 07/06/22. Received 1u PRBC 7/7/22 for hb 7.0\"    Review of Systems:     Constitutional:  negative for chills, fevers, sweats  Respiratory:  negative for cough, dyspnea on exertion, shortness of breath, wheezing  Cardiovascular:  negative for chest pain, chest pressure/discomfort, lower extremity edema, palpitations  Gastrointestinal:  negative for abdominal pain, constipation, diarrhea, nausea, vomiting  Neurological:  negative for dizziness, headache    Medications: Allergies:     Allergies   Allergen Reactions    Latex Rash    Aleve [Naproxen Sodium]      Chronic kidney disease stage III, CHF    Pioglitazone Other (See Comments)     Congestive heart failure    Claritin [Loratadine]     Keflex [Cephalexin]     Lisinopril      Needs clarification of contraindication       Current Meds:   Scheduled Meds:    furosemide  80 mg Oral Daily    ferrous sulfate  325 mg Oral BID WC    ascorbic acid  500 mg Oral BID    QUEtiapine  25 mg Oral Nightly    guaiFENesin  600 mg Oral BID    polyethylene glycol  17 g Oral BID    pantoprazole  40 mg Oral QAM AC    apixaban  5 mg Oral BID    metoprolol tartrate  25 mg Oral BID    epoetin rick-epbx  5,000 Units IntraVENous Q MWF    levothyroxine  25 mcg Oral Daily    amiodarone  100 mg Oral Daily    aspirin EC  81 mg Oral Daily    atorvastatin  40 mg Oral Daily    magnesium oxide  400 mg Oral BID    miconazole   Topical BID     Continuous Infusions:    sodium chloride      sodium chloride      sodium chloride      sodium chloride      dextrose      sodium chloride 75 mL/hr at 07/06/22 2000     PRN Meds: sodium chloride, albuterol, haloperidol lactate, heparin (porcine), heparin (porcine), lidocaine, nitroglycerin, bisacodyl, sodium chloride, albumin human, sodium chloride flush, sodium chloride, sodium chloride, fentanNYL, glucose, dextrose bolus **OR** dextrose bolus, glucagon (rDNA), dextrose, sodium chloride flush, sodium chloride, ondansetron **OR** ondansetron, acetaminophen **OR** acetaminophen, morphine    Data:     Past Medical History:   has a past medical history of Acute CVA (cerebrovascular accident) ([facility]), Acute on chronic combined systolic (congestive) and diastolic (congestive) heart failure ([facility]), Arthritis, Asthma, Bradycardia, ESRD (end stage renal disease) ([facility]), Essential hypertension, Hallucinations, Hard of hearing, Head contusion, Iron deficiency anemia, unspecified, Meningioma ([facility]), Myocardial infarction ([facility]), Pure hypercholesterolemia, Type 2 diabetes mellitus with stage 4 chronic kidney disease, without long-term current use of insulin ([facility]), Unspecified venous (peripheral) insufficiency, and Unspecified vitamin D deficiency. Social History:   reports that she quit smoking about 22 years ago. She has a 2.50 pack-year smoking history. She has never used smokeless tobacco. She reports that she does not currently use alcohol. She reports that she does not use drugs.      Family History:   Family History   Problem Relation Age of Onset    Diabetes Mother     Heart Disease Mother     Heart Disease Father     Diabetes Sister     High Blood Pressure Sister     Diabetes Maternal Grandmother        Vitals:  BP (!) 125/44   Pulse 80   Temp 98.2 °F (36.8 °C) (Axillary)   Resp 24   Ht 4' 11\" (1.499 m)   Wt 133 lb 9.6 oz (60.6 kg)   SpO2 97%   BMI 26.98 kg/m²   Temp (24hrs), Av.1 °F (36.7 °C), Min:97.2 °F (36.2 °C), Max:98.8 °F (37.1 °C)    Recent Labs     22  1923 22  0655 22  1100 22  1154   POCGLU 113* 86 57* 100       I/O (24Hr):     Intake/Output Summary (Last 24 hours) at 2022 1207  Last data filed at 2022 0930  Gross per 24 hour   Intake 380 ml   Output 400 ml   Net -20 ml       Labs:  Hematology:  Recent Labs     22  0600 22  0825 22  1646 22  0947   WBC 6.3 6.9  --  10.3   RBC 2.36* 2.13*  --  2.56*   HGB 7.1* 6.3* 7.2* 7.5*   HCT 24.1* 22.4* 23.6* 26.1*   .1 105.2*  --  102.0   MCH 30.1 29.6  --  29.3   MCHC 29.5 28.1*  --  28.7   RDW 16.4* 16.3*  --  19.9*    241  --  210   MPV 9.7 9.7  --  10.0     Chemistry:  Recent Labs     22  0600 22  0825 22  0947    141 141   K 3.9 3.7 4.5    103 103   CO2 29 31 28   GLUCOSE 101* 99 131*   BUN 36* 30* 62*   CREATININE 2.45* 1.73* 2.24*   ANIONGAP 8* 7* 10   LABGLOM 19* 29* 21*   GFRAA 23* 35* 26*   CALCIUM 7.6* 7.4* 7.8*     Recent Labs     22  1202 22  1738 22  1923 22  0655 22  1100 22  1154   POCGLU 113* 123* 113* 86 57* 100     ABG:  Lab Results   Component Value Date/Time    POCPH 7.411 2022 04:40 AM    PHART 7.459 2022 04:28 AM    POCPCO2 44.8 2022 04:40 AM    LYV3DAX 39.9 2022 04:28 AM    POCPO2 92.6 2022 04:40 AM    PO2ART 107.0 2022 04:28 AM    POCHCO3 28.5 2022 04:40 AM    ZPT0SPA 28.3 2022 04:28 AM    NBEA 5 2022 05:36 PM    PBEA 4 2022 04:40 AM    LGNG8AHC 97 2022 04:40 AM    Z6AZKYKX 97.4 2022 04:28 AM    FIO2 30.0 2022 04:40 AM     Lab Results Component Value Date/Time    SPECIAL NOT GIVEN 06/27/2022 01:11 PM     Lab Results   Component Value Date/Time    CULTURE NO GROWTH 5 DAYS 07/09/2022 05:12 AM    CULTURE NO GROWTH 5 DAYS 07/09/2022 05:12 AM       Radiology:  XR CHEST (SINGLE VIEW FRONTAL)    Result Date: 7/14/2022  Stable findings of CHF. XR CHEST PORTABLE    Result Date: 7/11/2022  Findings are compatible with pulmonary edema. Bilateral pleural effusions. FL MODIFIED BARIUM SWALLOW W VIDEO    Result Date: 7/10/2022  1. Trace flash penetration without aspiration with the thin liquid substance by straw. 2. No penetration or aspiration with the remainder of the administered substances. Please see separate speech pathology report for full discussion of findings and recommendations.        Physical Examination:        General appearance:  alert, cooperative and no distress  Mental Status:  oriented to person and normal affect  Lungs:  clear to auscultation bilaterally, normal effort  Heart:  regular rate and rhythm, no murmur  Abdomen:  soft, nontender, nondistended, normal bowel sounds, no masses, hepatomegaly, splenomegaly  Extremities:  no edema, redness, tenderness in the calves  Skin:  no gross lesions, rashes, induration    Assessment:        Hospital Problems             Last Modified POA    * (Principal) Toxic metabolic encephalopathy 4/55/8254 Yes    Lymphedema 6/28/2022 Yes    Acute kidney injury superimposed on CKD ([address] 7/18/2022 Yes    Bradycardia 7/15/2022 Yes    Hyperkalemia 7/15/2022 Yes    Shock ([address] 7/15/2022 Yes    Acute respiratory failure with hypoxia and hypercapnia (HCC) 6/28/2022 Yes    Pulmonary edema cardiac cause ([address] 6/28/2022 Yes    Dependence on renal dialysis ([address] 7/5/2022 Yes    Nephrotic range proteinuria 7/5/2022 Yes    External hemorrhoid 7/10/2022 Yes    Dysphagia 7/10/2022 Yes    Debility 7/12/2022 Yes    Sepsis ([address] 7/12/2022 Yes    Arterial hypotension 7/14/2022 Yes    Acute on chronic diastolic congestive heart failure ([facility] 6/28/2022 Yes    JOY (acute kidney injury) ([facility] 7/15/2022 Yes        Plan:          Needs outpatient vce as recommended by hematology; has already had recent egd and colonoscopy (both negative)  Increased iron to bid and add vit c to enhance absorption  Dc to snf today  Cleve and med rec updated  D/w son and daughter    Gonzalo Bailey Blood, DO  7/18/2022  12:07 PM

## 2022-07-18 NOTE — PROGRESS NOTES
Pt is being discharged to [facility]. Report called Nurse who will being care for the pt. Reviewed admission course, PMHx, medications, skin assessment and treatment for BLE. Removed PIV to LUE. Pt belongings gathered by family members. Admission paperwork with transport personal. Pt transferred to wheelchair via [address] [place] [address] steady.

## 2022-07-18 NOTE — PLAN OF CARE
Problem: Discharge Planning  Goal: Discharge to home or other facility with appropriate resources  Outcome: Progressing     Problem: Chronic Conditions and Co-morbidities  Goal: Patient's chronic conditions and co-morbidity symptoms are monitored and maintained or improved  7/18/2022 0748 by Kristen Marie RN  Outcome: Progressing  7/18/2022 0324 by Kevan Tyler RN  Outcome: Progressing     Problem: Pain  Goal: Verbalizes/displays adequate comfort level or baseline comfort level  7/18/2022 0748 by Kristen Marie RN  Outcome: Progressing  7/18/2022 0324 by Kevan Tyler RN  Outcome: Progressing     Problem: Skin/Tissue Integrity  Goal: Absence of new skin breakdown  Description: 1. Monitor for areas of redness and/or skin breakdown  2. Assess vascular access sites hourly  3. Every 4-6 hours minimum:  Change oxygen saturation probe site  4. Every 4-6 hours:  If on nasal continuous positive airway pressure, respiratory therapy assess nares and determine need for appliance change or resting period.   7/18/2022 0748 by Kristen Marie RN  Outcome: Progressing  7/18/2022 0324 by Kevan Tyler RN  Outcome: Progressing     Problem: Safety - Adult  Goal: Free from fall injury  7/18/2022 0748 by Kristen Marie RN  Outcome: Progressing  7/18/2022 0324 by Kevan Tyler RN  Outcome: Progressing     Problem: ABCDS Injury Assessment  Goal: Absence of physical injury  7/18/2022 0748 by Kristen Marie RN  Outcome: Progressing  7/18/2022 0324 by Kevan Tyler RN  Outcome: Progressing     Problem: Respiratory - Adult  Goal: Achieves optimal ventilation and oxygenation  Outcome: Progressing     Problem: Nutrition Deficit:  Goal: Optimize nutritional status  Outcome: Progressing

## 2022-07-18 NOTE — PROGRESS NOTES
Nephrology Progress Note      SUBJECTIVE       Pt was seen and examined. No acute issues overnite. Stable hemodynamics . She was last dialyzed on Saturday. Recently patient was admitted with hypoxia bradycardia, required mechanical intubation. Developed acute renal injury, tunneled catheter placed on eighth of this month and hemodialysis continues since then. She does have history of CKD stage IV with a baseline creatinine of 2.0-2.2 with heavy proteinuria likely from diabetic renal involvement from the past.  We have been awaiting placement for her. Currently dialyzing TTS schedule. OBJECTIVE      CURRENT TEMPERATURE:  Temp: 98.4 °F (36.9 °C)  MAXIMUM TEMPERATURE OVER 24HRS:  Temp (24hrs), Av.1 °F (36.7 °C), Min:97.2 °F (36.2 °C), Max:98.8 °F (37.1 °C)    CURRENT RESPIRATORY RATE:  Resp: 20  CURRENT PULSE:  Heart Rate: 88  CURRENT BLOOD PRESSURE:  BP: 130/67  24HR BLOOD PRESSURE RANGE:  Systolic (91JTM), VME:175 , Min:113 , PXN:234   ; Diastolic (42PQK), TYL:51, Min:43, Max:67    24HR INTAKE/OUTPUT:    Intake/Output Summary (Last 24 hours) at 2022 0959  Last data filed at 2022 0500  Gross per 24 hour   Intake 330 ml   Output 200 ml   Net 130 ml     WEIGHT :Patient Vitals for the past 96 hrs (Last 3 readings):   Weight   22 1345 133 lb 9.6 oz (60.6 kg)   22 1000 137 lb 12.6 oz (62.5 kg)   07/15/22 0600 139 lb 15.9 oz (63.5 kg)     PHYSICAL EXAM      GENERAL APPEARANCE:Awake, alert, in no acute distress  SKIN: warm and dry, no rash or erythema  EYES: conjunctivae normal and sclera anicteric  ENT: no thrush no pharyngeal congestion   NECK:   No JVD. No carotid bruits and no carotid lymphadenopathy . PULMONARY: lungs are clear although somewhat diminished on auscultation. CADRDIOVASCULAR: S1 and S2 irregular NO S4 . No rubs , no murmur. ABDOMEN: soft nontender, bowel sounds present, no organomegaly, no ascites.      EXTREMITIES:  + edema     CURRENT MEDICATIONS      ferrous sulfate (FE TABS 325) EC tablet 325 mg, BID WC  ascorbic acid (VITAMIN C) tablet 500 mg, BID  0.9 % sodium chloride infusion, PRN  QUEtiapine (SEROQUEL) tablet 25 mg, Nightly  albuterol (PROVENTIL) nebulizer solution 2.5 mg, Q4H PRN  guaiFENesin (MUCINEX) extended release tablet 600 mg, BID  haloperidol lactate (HALDOL) injection 1 mg, Q6H PRN  heparin (porcine) injection 1,600 Units, PRN  heparin (porcine) injection 1,600 Units, PRN  polyethylene glycol (GLYCOLAX) packet 17 g, BID  pantoprazole (PROTONIX) tablet 40 mg, QAM AC  apixaban (ELIQUIS) tablet 5 mg, BID  lidocaine (XYLOCAINE) 2 % jelly, PRN  nitroglycerin (NITRO-BID) 2 % ointment 0.5 inch, BID PRN  bisacodyl (DULCOLAX) suppository 10 mg, Daily PRN  metoprolol tartrate (LOPRESSOR) tablet 25 mg, BID  epoetin rick-epbx (RETACRIT) injection 5,000 Units, Q MWF  0.9 % sodium chloride infusion, PRN  albumin human 25 % IV solution 25 g, PRN  levothyroxine (SYNTHROID) tablet 25 mcg, Daily  sodium chloride flush 0.9 % injection 5-40 mL, PRN  0.9 % sodium chloride infusion, PRN  0.9 % sodium chloride infusion, PRN  amiodarone (CORDARONE) tablet 100 mg, Daily  fentaNYL (SUBLIMAZE) injection 50 mcg, Q2H PRN  glucose chewable tablet 16 g, PRN  dextrose bolus 10% 125 mL, PRN   Or  dextrose bolus 10% 250 mL, PRN  glucagon (rDNA) injection 1 mg, PRN  dextrose 5 % solution, PRN  sodium chloride flush 0.9 % injection 5-40 mL, PRN  0.9 % sodium chloride infusion, PRN  ondansetron (ZOFRAN-ODT) disintegrating tablet 4 mg, Q8H PRN   Or  ondansetron (ZOFRAN) injection 4 mg, Q6H PRN  acetaminophen (TYLENOL) tablet 650 mg, Q6H PRN   Or  acetaminophen (TYLENOL) suppository 650 mg, Q6H PRN  aspirin EC tablet 81 mg, Daily  atorvastatin (LIPITOR) tablet 40 mg, Daily  magnesium oxide (MAG-OX) tablet 400 mg, BID  miconazole (MICOTIN) 2 % powder, BID  morphine (PF) injection 2 mg, Q4H PRN          LABS      CBC:   Recent Labs     07/16/22  0600 07/17/22  0825 07/17/22  1646   WBC 6.3 6.9 --    RBC 2.36* 2.13*  --    HGB 7.1* 6.3* 7.2*   HCT 24.1* 22.4* 23.6*   .1 105.2*  --    MCH 30.1 29.6  --    MCHC 29.5 28.1*  --    RDW 16.4* 16.3*  --     241  --    MPV 9.7 9.7  --       BMP:   Recent Labs     07/16/22  0600 07/17/22  0825    141   K 3.9 3.7    103   CO2 29 31   BUN 36* 30*   CREATININE 2.45* 1.73*   GLUCOSE 101* 99   CALCIUM 7.6* 7.4*          YRIS:   Lab Results   Component Value Date    YRIS NEGATIVE 10/27/2020       SPEP:   Lab Results   Component Value Date/Time    PROT 5.3 06/27/2022 02:59 PM    ALBCAL 2.5 06/01/2022 03:00 PM    ALBPCT 48 06/01/2022 03:00 PM    LABALPH 0.3 06/01/2022 03:00 PM    LABALPH 1.2 06/01/2022 03:00 PM    A1PCT 6 06/01/2022 03:00 PM    A2PCT 23 06/01/2022 03:00 PM    LABBETA 0.7 06/01/2022 03:00 PM    BETAPCT 13 06/01/2022 03:00 PM    GAMGLOB 0.5 06/01/2022 03:00 PM    GGPCT 10 06/01/2022 03:00 PM    PATH ELECTRONICALLY SIGNED. Harper Gould M.D. 06/01/2022 03:00 PM    PATH ELECTRONICALLY SIGNED.  Harper Gould M.D. 06/01/2022 03:00 PM     UPEP:   Lab Results   Component Value Date/Time     10/27/2020 04:35 PM      HEPBSAG:  Lab Results   Component Value Date/Time    HEPBSAG NONREACTIVE 07/14/2022 02:22 PM     HEPCAB:  Lab Results   Component Value Date/Time    HEPCAB NONREACTIVE 06/28/2022 02:56 PM     C3:   Lab Results   Component Value Date    C3 147 10/27/2020     C4:   Lab Results   Component Value Date    C4 29 10/27/2020     MPO ANCA:   Lab Results   Component Value Date/Time    MPO 5 10/27/2020 04:37 PM    .  PR3 ANCA:    Lab Results   Component Value Date/Time    PR3 16 10/27/2020 04:37 PM     URINE SODIUM:    Lab Results   Component Value Date/Time    SLOAN <20 06/08/2022 09:52 PM      URINE CREATININE:    Lab Results   Component Value Date/Time    LABCREA 63.0 06/08/2022 09:52 PM     URINE EOSINOPHILS:   Lab Results   Component Value Date/Time    UREO RARE 10/27/2020 04:35 PM     URINE PROTEIN:    Lab Results Component Value Date/Time     10/27/2020 04:35 PM     URINALYSIS:  U/A:   Lab Results   Component Value Date/Time    NITRU NEGATIVE 06/27/2022 06:35 PM    COLORU Dark Yellow 06/27/2022 06:35 PM    PHUR 5.5 06/27/2022 06:35 PM    WBCUA 20 TO 50 06/27/2022 06:35 PM    RBCUA 20 TO 50 06/27/2022 06:35 PM    MUCUS 1+ 06/27/2022 06:35 PM    TRICHOMONAS NOT REPORTED 02/07/2022 02:42 AM    YEAST NOT REPORTED 02/07/2022 02:42 AM    BACTERIA FEW 06/12/2022 09:33 PM    SPECGRAV 1.030 06/27/2022 06:35 PM    LEUKOCYTESUR SMALL 06/27/2022 06:35 PM    UROBILINOGEN Normal 06/27/2022 06:35 PM    BILIRUBINUR NEGATIVE 06/27/2022 06:35 PM    GLUCOSEU 1+ 06/27/2022 06:35 PM    KETUA TRACE 06/27/2022 06:35 PM    AMORPHOUS NOT REPORTED 02/07/2022 02:42 AM         ASSESSMENT      JOY currently HD dependent, likely secondary to ATN from hypotension requiring pressor support vs progression from underlying diabetic nephropathy, getting dialysis as per TTS schedule. Hemodialysis initiated on 6/28/2022. Right IJ tunneled cath placed 7/8/22. Has CKD stage IV to begin with and proteinuria close to 16 g likelihood of renal recovery slim. Hyperkalemia likely from progressive decline in renal function, now improved with dialysis. Nephrotic range proteinuria likely due to diabetic nephropathy. Proteinuria about 16 g  Diabetes type 2. Extubated 7/6/22. Nonhealing lower extremity ulcers. Hypotension did require pressor support this admission. Fluid overload. PLAN      1. Hemodialysis tomorrow per routine   2. If outpatient arrangements are made, she is okay to be discharged from my standpoint anytime  3. Follow up labs ordered. 4.  We will start her on 80 mg of Lasix orally once a day      Please do not hesitate to call with questions.     This note is created with the assistance of a speech-recognition program. While intending to generate a document that actually reflects the content of the visit, no guarantees can be provided that every mistake has been identified and corrected by editing    Electronically signed by Vu Nuñez MD on 7/18/2022 at 9:59 AM

## 2022-07-18 NOTE — CARE COORDINATION
Received call from Agustina Mascorro at [facility] requesting covid vaccination status. Met with pt. She has not received any covid vaccinations. Notified Agustina Mascorro. He is working on getting pt approved for admission today. Pt updated and agreeable    1230 notified by Agustina Mascorro that they are able to accept today. Pt and daughter updated. Transport request faxed to [address] [place] [address] for asap. HENS completed    1314 spoke to [facility] at [facility] Pt scheduled for 2pm . Notified Agustina Mascorro. AVS faxed. Notified pt and family.  They are agreeable    Discharge Report    [facility] Case Management Department  Written by: Carlyle Banegas RN    Patient Name: Steve Lees  Attending Provider: Garland Cummings DO  Admit Date: 2022 12:28 PM  MRN: [id number]  Account: [de-identified]                     : 1950  Discharge Date: 2022      Disposition: [facility]    Carlyle Banegas RN

## 2022-07-18 NOTE — PROGRESS NOTES
requires frequent verbal cues for sequencing during STS trasnfer with fair return. Pt is limited by decreased BLE strength, decreased endurance, and decreased balance. Pt is currently unsafe to return to prior living arrangements due to high fall risk and would benefit from continued PT following discharge to address functional deficits. Therapy Prognosis: Good  Decision Making: High Complexity  Requires PT Follow-Up: Yes  Activity Tolerance  Activity Tolerance: Patient limited by fatigue;Patient limited by endurance  Activity Tolerance Comments: c/o fear of falling, pt reassured throughout. SpO2 remained WNL t/o session while on room air. Plan   Plan  Plan:  (6x/wk)  Current Treatment Recommendations: Strengthening, Balance training, Functional mobility training, Transfer training, Endurance training, Gait training, Stair training, Home exercise program, Safety education & training, Patient/Caregiver education & training, Equipment evaluation, education, & procurement  Safety Devices  Type of Devices: Call light within reach, Nurse notified, Gait belt, Patient at risk for falls, Heels elevated for pressure relief, Left in chair  Restraints  Restraints Initially in Place: No     Restrictions  Restrictions/Precautions  Restrictions/Precautions: Fall Risk, Up as Tolerated  Required Braces or Orthoses?: No  Position Activity Restriction  Other position/activity restrictions: 6/28 emergent intubation and non-tunnelled HD catheter. extubated 7/6 at [address]  Chart Reviewed: Yes  Response To Previous Treatment: Patient with no complaints from previous session. Family / Caregiver Present: Yes  Follows Commands: Within Functional Limits  General Comment  Comments: Pt left upright in chair, alarm activated and son present in room  Subjective  Subjective: Pt and RN agreeable to PT. Pt resting in bed upon arrival with son present.  Very pleasant and cooperative with treatment, lethargic but very motivated to participate. Cognition   Orientation  Overall Orientation Status: Within Functional Limits  Orientation Level: Oriented to place;Oriented to person;Disoriented to situation  Cognition  Overall Cognitive Status: Exceptions  Following Commands: Follows one step commands consistently; Follows multistep commands with increased time; Follows multistep commands with repitition  Attention Span: Attends with cues to redirect  Memory: Decreased long term memory  Safety Judgement: Good awareness of safety precautions  Problem Solving: Assistance required to generate solutions  Insights: Fully aware of deficits  Initiation: Requires cues for some  Sequencing: Requires cues for some  Cognition Comment: Pt requires increased time. Objective   Bed mobility  Supine to Sit: Moderate assistance  Sit to Supine: Moderate assistance  Scooting: Moderate assistance  Bed Mobility Comments: HOB elevated to complete with increased time and effort. Transfers  Sit to Stand: Minimal Assistance  Stand to sit: Minimal Assistance  Comment: STS transfer completed with sammie garcia pt demonstating good effort with transfers. Balance  Posture: Poor  Sitting - Static: Fair  Sitting - Dynamic: Fair  Standing - Static: Fair;-  Standing - Dynamic: Fair;-  Comments: standing balance assessed in sammie garcia pt able to maintain standing ~30 secs x3 trials  A/AROM Exercises: Ankle pumps x20 BLE, SLRs x10 BLE, KTC x15 BLE, LAQs x15 BLE.           AM-PAC Score  AM-PAC Inpatient Mobility Raw Score : 9 (07/15/22 1535)  AM-PAC Inpatient T-Scale Score : 30.55 (07/15/22 1535)  Mobility Inpatient CMS 0-100% Score: 81.38 (07/15/22 1535)  Mobility Inpatient CMS G-Code Modifier : CM (07/15/22 1535)          Goals  Short Term Goals  Time Frame for Short term goals: 14 visits  Short term goal 1: Pt will be CGA mobility  Short term goal 2: Pt will be CGA transfers  Short term goal 3: Pt will be Mehdi amb 50' RW  Short term goal 4: Pt will navigate 4 steps Mehdi dorado         Therapy Time   Individual Concurrent Group Co-treatment   Time In 0850         Time Out 0930         Minutes 40         Timed Code Treatment Minutes: [address], PTA

## 2022-07-18 NOTE — DISCHARGE SUMMARY
[facility]  Office: [address], DO, Mercedes Amezcua, DO, Boni Carrera, DO, Shani Dana Cummings, DO, Blake Cleaning MD, Eloisa Luke MD, Zita Victoria MD, Svetlana Fernández MD, Alli Robertson MD, Maricruz Powell MD, Joel Herbert MD, Mckinley Kingston, DO, Irma Laws MD,  Carroll Dunn, DO, Cherry Reese MD, Lieutenant Alfredito MD, Nadege Smith, DO, García King MD, Idania Singh MD, Ghulam Reynoso, DO, Crys Rankin MD, Karely Banks MD, Stephon Moeller, [facility], [facility], CNP, Harvey Alvarado, CNP, Jadon Zurita, CNP, Britany Ho, CNP, Delonte Delgado, CNP, Norma Beal PAToryC, Madison Santiago, [facility], Justin Sharp, CNP, Ugo Ulloa, CNP, Johanna Dubon, CNP, Sonia Alfaro, CNS, Jennifer Christian, [facility], Roger Kline, CNP, Renetta Nicholas, CNP, Anya Godoy, [facility]    Discharge Summary     Patient ID: Leah Leonard  :     MRN: [id number]     ACCOUNT:  [de-identified]   Patient's PCP: Gavino Cyr MD  Admit Date: 2022   Discharge Date: 2022     Length of Stay: 21  Code Status:  Full Code  Admitting Physician: No admitting provider for patient encounter.   Discharge Physician: Gonzalo Cummings DO     Active Discharge Diagnoses:     Hospital Problem Lists:  Principal Problem:    Toxic metabolic encephalopathy  Active Problems:    Lymphedema    Acute kidney injury superimposed on CKD (HCC)    Bradycardia    Hyperkalemia    Shock ([address]    Acute respiratory failure with hypoxia and hypercapnia (HCC)    Pulmonary edema cardiac cause (HCC)    Dependence on renal dialysis (HCC)    Nephrotic range proteinuria    External hemorrhoid    Dysphagia    Debility    Sepsis ([address]    Arterial hypotension    Acute on chronic diastolic congestive heart failure (HCC)    JOY (acute kidney injury) ([facility]  Resolved Problems:    Bradycardia    Leukocytosis      Admission Condition:  poor     Discharged Condition: stable    Hospital Stay:     Hospital Course:  Taniya Whitlock is a 66 y.o. female who was admitted for the management of   Toxic metabolic encephalopathy , presented to ER with Bradycardia    Admitted with encephalopathy, hypoxia and bradycardia, required atropine and transcutaneous pacing. She had chf, admitted to icu. She required epi drip and was seen by cardio. She also had nyla with hyperkalemia and dialysis was initiated on 6/28/22 by renal.  She is now dialysis dependent. She was seen by neurology for encephalopathy and mri brain was deferred as it was considered nonemergent. Her encephalopathy improved some but even on discharge she had had some residual confusion. ID evaluated her for ble wounds and she was started on cefepime from 6/28-7/2 after 1 dose of levaquin on 6/27. She was extubated on 7/6/22. She has required couple units prbc for recurrent anemia and has been started on retacrit.   She also needs to have vce, which family had scheduled previously but patient got admitted during that time      Significant therapeutic interventions: see above    Significant Diagnostic Studies:   Labs / Micro:  CBC:   Lab Results   Component Value Date/Time    WBC 10.3 07/18/2022 09:47 AM    RBC 2.56 07/18/2022 09:47 AM    HGB 7.5 07/18/2022 09:47 AM    HCT 26.1 07/18/2022 09:47 AM    .0 07/18/2022 09:47 AM    MCH 29.3 07/18/2022 09:47 AM    MCHC 28.7 07/18/2022 09:47 AM    RDW 19.9 07/18/2022 09:47 AM     07/18/2022 09:47 AM     CMP:    Lab Results   Component Value Date/Time    GLUCOSE 131 07/18/2022 09:47 AM     07/18/2022 09:47 AM    K 4.5 07/18/2022 09:47 AM     07/18/2022 09:47 AM    CO2 28 07/18/2022 09:47 AM    BUN 62 07/18/2022 09:47 AM    CREATININE 2.24 07/18/2022 09:47 AM    ANIONGAP 10 07/18/2022 09:47 AM    ALKPHOS 162 06/27/2022 02:59 PM    ALT 25 06/27/2022 02:59 PM    AST 38 06/27/2022 02:59 PM    BILITOT <0.10 06/27/2022 02:59 PM    LABALBU 2.6 06/27/2022 02:59 PM    ALBUMIN 1.0 06/27/2022 02:59 PM    LABGLOM 21 07/18/2022 09:47 AM    GFRAA 26 07/18/2022 09:47 AM    GFR      07/18/2022 09:47 AM    PROT 5.3 06/27/2022 02:59 PM    CALCIUM 7.8 07/18/2022 09:47 AM        Radiology:  XR CHEST (SINGLE VIEW FRONTAL)    Result Date: 7/14/2022  Stable findings of CHF. Consultations:    Consults:     Final Specialist Recommendations/Findings:   IP CONSULT TO CARDIOLOGY  IP CONSULT TO CRITICAL CARE  IP CONSULT TO NEPHROLOGY  IP CONSULT TO NEUROLOGY  IP CONSULT TO INFECTIOUS DISEASES  IP CONSULT TO DIETITIAN  IP CONSULT TO DIETITIAN      The patient was seen and examined on day of discharge and this discharge summary is in conjunction with any daily progress note from day of discharge.     Discharge plan:     Disposition: snf    Physician Follow Up:     Flaquita Loza MD  [address] [place] [address].  85[facility]  [address] [address]  [phone]    Schedule an appointment as soon as possible for a visit   For follow up    [facility]  [address] [facility]  [phone]  Go to   As needed    [facility]  [facility] [address]  [phone]  Schedule an appointment as soon as possible for a visit   For follow up    [address], [address] [place] [address], [address] [place] [address]  In 6 weeks  Hospital follow-up    [address] [address]    Tue-Thur-Sat @ 11:15a       Requiring Further Evaluation/Follow Up POST HOSPITALIZATION/Incidental Findings: anemia, renal function and encephalopathy    Diet: renal diet    Activity: As tolerated    Instructions to Patient: take medications as prescribed      Discharge Medications:      Medication List        START taking these medications      ascorbic acid 500 MG tablet  Commonly known as: VITAMIN C  Take 1 tablet by mouth in the morning and 1 tablet before bedtime. epoetin rick-epbx 77507 UNIT/ML Soln injection  Commonly known as: RETACRIT  Inject 0.5 mLs into the skin three times a week     furosemide 80 MG tablet  Commonly known as: LASIX  Take 1 tablet by mouth in the morning. hydrocortisone 25 MG suppository  Commonly known as: ANUSOL-HC  Place 1 suppository rectally 2 times daily     levothyroxine 25 MCG tablet  Commonly known as: SYNTHROID  Take 1 tablet by mouth Daily     pantoprazole 40 MG tablet  Commonly known as: PROTONIX  Take 1 tablet by mouth every morning (before breakfast)  Start taking on: July 19, 2022     polyethylene glycol 17 g packet  Commonly known as: GLYCOLAX  Take 17 g by mouth 2 times daily     QUEtiapine 25 MG tablet  Commonly known as: SEROQUEL  Take 1 tablet by mouth nightly            CHANGE how you take these medications      albuterol sulfate  (90 Base) MCG/ACT inhaler  Commonly known as: ProAir HFA  Inhale 2 puffs into the lungs every 6 hours as needed for Wheezing or Shortness of Breath (cough)  What changed: Another medication with the same name was removed. Continue taking this medication, and follow the directions you see here. amiodarone 100 MG tablet  Commonly known as: PACERONE  Take 1 tablet by mouth daily  What changed:   medication strength  how much to take     apixaban 5 MG Tabs tablet  Commonly known as: ELIQUIS  Take 1 tablet by mouth 2 times daily  What changed:   medication strength  how much to take  Another medication with the same name was removed. Continue taking this medication, and follow the directions you see here. aspirin EC 81 MG EC tablet  Take 1 tablet by mouth daily  What changed: Another medication with the same name was removed. Continue taking this medication, and follow the directions you see here.      ferrous sulfate 325 (65 Fe) MG tablet  Commonly known as: IRON 325  Take 1 tablet by mouth in the morning and at bedtime  What changed:   when to take this  Another medication with the same name was removed. Continue taking this medication, and follow the directions you see here. fluticasone-salmeterol 115-21 MCG/ACT inhaler  Commonly known as: Advair HFA  Inhale 2 puffs into the lungs 2 times daily Maintenance inhaler  What changed: Another medication with the same name was removed. Continue taking this medication, and follow the directions you see here. metoprolol tartrate 25 MG tablet  Commonly known as: LOPRESSOR  Take 1 tablet by mouth 2 times daily  What changed: Another medication with the same name was removed. Continue taking this medication, and follow the directions you see here. CONTINUE taking these medications      Adjust Fold Cane/York Handle Misc  Use daily for walking     atorvastatin 40 MG tablet  Commonly known as: LIPITOR  Take 1 tablet by mouth once daily     blood glucose monitor kit and supplies  1 kit by Other route three times daily Dispense Butterfly Elite CBG Device     * Blood Pressure Kit  1 kit by Does not apply route three times daily     * Blood Pressure Kit  Diagnosis: HTN. Needs to check blood pressure 1-2 times a day until stable, then once a day. Goal blood pressure less than 135/85, and above 110/60. Compressor/Nebulizer Misc  Nebulizer with compressor. Disposable Med Nebs 2 /month. Reusable Med Nebs 1 per 6 months. Aerosol Mask 1 per month. Replacement Filters 2 per month. All other related supplies as needed per month. Medications being used: Albuterol . 083 unit dose vial. Frequency: every 6 hrs x 4/day . Length of Need: 1     desloratadine 5 MG tablet  Commonly known as: CLARINEX  Take 1 tablet by mouth once daily     FreeStyle Lancets Misc  1 each by Does not apply route daily Patient needs to contact office before any further refills will be approved     FREESTYLE LITE strip  Generic drug: blood glucose test strips  1 each by In Vitro route daily As needed.      Handicap Placard Misc  by Does not apply route Expires on 12/30/25     magnesium oxide 400 (241.3 Mg) MG Tabs tablet  Commonly known as: MAG-OX  Take 1 tablet by mouth twice daily     miconazole 2 % powder  Commonly known as: MICOTIN  Apply topically 2 times daily UNDER THE SKIN FOLDS LONG TERM     therapeutic multivitamin-minerals tablet  Take 1 tablet by mouth daily     vitamin D 1.25 MG (27634 UT) Caps capsule  Commonly known as: ERGOCALCIFEROL  Take 1 capsule by mouth once a week Sundays           * This list has 2 medication(s) that are the same as other medications prescribed for you. Read the directions carefully, and ask your doctor or other care provider to review them with you. STOP taking these medications      amLODIPine 10 MG tablet  Commonly known as: NORVASC     hydrALAZINE 25 MG tablet  Commonly known as: APRESOLINE               Where to Get Your Medications        These medications were sent to [facility] [address] [facility], [address] [facility]  [address] [facility], [address] [address]      Phone: [phone]   amiodarone 100 MG tablet  apixaban 5 MG Tabs tablet       You can get these medications from any pharmacy    Bring a paper prescription for each of these medications  QUEtiapine 25 MG tablet       Information about where to get these medications is not yet available    Ask your nurse or doctor about these medications  ascorbic acid 500 MG tablet  epoetin rick-epbx 75295 UNIT/ML Soln injection  ferrous sulfate 325 (65 Fe) MG tablet  furosemide 80 MG tablet  hydrocortisone 25 MG suppository  levothyroxine 25 MCG tablet  pantoprazole 40 MG tablet  polyethylene glycol 17 g packet         No discharge procedures on file. Time Spent on discharge is  40 mins in patient examination, evaluation, counseling as well as medication reconciliation, prescriptions for required medications, discharge plan and follow up.     Electronically signed by   Shell Cummings DO  7/18/2022  12:45 PM      Thank you Dr. Reji Almanza MD for the opportunity to be involved in this patient's care.

## 2022-07-19 ENCOUNTER — APPOINTMENT (OUTPATIENT)
Dept: GENERAL RADIOLOGY | Age: 72
DRG: 377 | End: 2022-07-19
Payer: OTHER GOVERNMENT

## 2022-07-19 ENCOUNTER — HOSPITAL ENCOUNTER (INPATIENT)
Age: 72
LOS: 6 days | Discharge: HOME | DRG: 377 | End: 2022-07-26
Attending: EMERGENCY MEDICINE | Admitting: INTERNAL MEDICINE
Payer: OTHER GOVERNMENT

## 2022-07-19 DIAGNOSIS — D64.9 ANEMIA, UNSPECIFIED TYPE: ICD-10-CM

## 2022-07-19 DIAGNOSIS — K92.2 GASTROINTESTINAL HEMORRHAGE, UNSPECIFIED GASTROINTESTINAL HEMORRHAGE TYPE: Primary | ICD-10-CM

## 2022-07-19 PROCEDURE — 82270 OCCULT BLOOD FECES: CPT

## 2022-07-19 PROCEDURE — 96365 THER/PROPH/DIAG IV INF INIT: CPT

## 2022-07-19 PROCEDURE — 99285 EMERGENCY DEPT VISIT HI MDM: CPT

## 2022-07-19 PROCEDURE — 71045 X-RAY EXAM CHEST 1 VIEW: CPT

## 2022-07-19 ASSESSMENT — ENCOUNTER SYMPTOMS
SHORTNESS OF BREATH: 0
COLOR CHANGE: 0
EYE PAIN: 0
BLOOD IN STOOL: 1
ABDOMINAL PAIN: 0
BACK PAIN: 0

## 2022-07-19 ASSESSMENT — PAIN - FUNCTIONAL ASSESSMENT: PAIN_FUNCTIONAL_ASSESSMENT: NONE - DENIES PAIN

## 2022-07-20 ENCOUNTER — APPOINTMENT (OUTPATIENT)
Dept: NUCLEAR MEDICINE | Age: 72
DRG: 377 | End: 2022-07-20
Payer: OTHER GOVERNMENT

## 2022-07-20 ENCOUNTER — APPOINTMENT (OUTPATIENT)
Dept: GENERAL RADIOLOGY | Age: 72
DRG: 377 | End: 2022-07-20
Payer: OTHER GOVERNMENT

## 2022-07-20 PROBLEM — K92.2 GASTROINTESTINAL HEMORRHAGE: Status: ACTIVE | Noted: 2022-07-20

## 2022-07-20 LAB
-: ABNORMAL
ABSOLUTE EOS #: 0.21 K/UL (ref 0–0.4)
ABSOLUTE LYMPH #: 0.76 K/UL (ref 1–4.8)
ABSOLUTE MONO #: 0.34 K/UL (ref 0.1–1.3)
ALBUMIN SERPL-MCNC: 1.9 G/DL (ref 3.5–5.2)
ALLEN TEST: ABNORMAL
ALP BLD-CCNC: 93 U/L (ref 35–104)
ALT SERPL-CCNC: 7 U/L (ref 5–33)
AMORPHOUS: ABNORMAL
ANION GAP SERPL CALCULATED.3IONS-SCNC: 10 MMOL/L (ref 9–17)
ANION GAP SERPL CALCULATED.3IONS-SCNC: 11 MMOL/L (ref 9–17)
AST SERPL-CCNC: 15 U/L
ATYPICAL LYMPHOCYTE ABSOLUTE COUNT: 0.14 K/UL
ATYPICAL LYMPHOCYTES: 2 %
BACTERIA: ABNORMAL
BASOPHILS # BLD: 0 % (ref 0–2)
BASOPHILS ABSOLUTE: 0 K/UL (ref 0–0.2)
BILIRUB SERPL-MCNC: 0.6 MG/DL (ref 0.3–1.2)
BILIRUBIN URINE: NEGATIVE
BUN BLDV-MCNC: 62 MG/DL (ref 8–23)
BUN BLDV-MCNC: 89 MG/DL (ref 8–23)
CALCIUM SERPL-MCNC: 8 MG/DL (ref 8.6–10.4)
CALCIUM SERPL-MCNC: 8.6 MG/DL (ref 8.6–10.4)
CARBOXYHEMOGLOBIN: 5.3 % (ref 0–5)
CHLORIDE BLD-SCNC: 101 MMOL/L (ref 98–107)
CHLORIDE BLD-SCNC: 102 MMOL/L (ref 98–107)
CO2: 26 MMOL/L (ref 20–31)
CO2: 27 MMOL/L (ref 20–31)
COLOR: ABNORMAL
CREAT SERPL-MCNC: 1.72 MG/DL (ref 0.5–0.9)
CREAT SERPL-MCNC: 2.86 MG/DL (ref 0.5–0.9)
DATE, STOOL #1: ABNORMAL
EKG ATRIAL RATE: 76 BPM
EKG Q-T INTERVAL: 376 MS
EKG QRS DURATION: 98 MS
EKG QTC CALCULATION (BAZETT): 472 MS
EKG R AXIS: 10 DEGREES
EKG T AXIS: -137 DEGREES
EKG VENTRICULAR RATE: 95 BPM
EOSINOPHILS RELATIVE PERCENT: 3 % (ref 0–4)
EPITHELIAL CELLS UA: ABNORMAL /HPF
FOLATE: 14.5 NG/ML
GFR AFRICAN AMERICAN: 20 ML/MIN
GFR AFRICAN AMERICAN: 35 ML/MIN
GFR NON-AFRICAN AMERICAN: 16 ML/MIN
GFR NON-AFRICAN AMERICAN: 29 ML/MIN
GFR SERPL CREATININE-BSD FRML MDRD: ABNORMAL ML/MIN/{1.73_M2}
GFR SERPL CREATININE-BSD FRML MDRD: ABNORMAL ML/MIN/{1.73_M2}
GLUCOSE BLD-MCNC: 114 MG/DL (ref 70–99)
GLUCOSE BLD-MCNC: 126 MG/DL (ref 70–99)
GLUCOSE BLD-MCNC: 96 MG/DL (ref 65–105)
GLUCOSE URINE: ABNORMAL
HCO3 ARTERIAL: 27.7 MMOL/L (ref 22–26)
HCT VFR BLD CALC: 15.1 % (ref 36–46)
HCT VFR BLD CALC: 21.3 % (ref 36–46)
HCT VFR BLD CALC: 27.2 % (ref 36–46)
HCT VFR BLD CALC: 27.2 % (ref 36–46)
HEMOCCULT SP1 STL QL: POSITIVE
HEMOGLOBIN: 4.9 G/DL (ref 12–16)
HEMOGLOBIN: 6.8 G/DL (ref 12–16)
HEMOGLOBIN: 8.7 G/DL (ref 12–16)
HEMOGLOBIN: 8.7 G/DL (ref 12–16)
INR BLD: 1.8
KETONES, URINE: ABNORMAL
LACTIC ACID: 0.8 MMOL/L (ref 0.5–2.2)
LEUKOCYTE ESTERASE, URINE: ABNORMAL
LIPASE: 18 U/L (ref 13–60)
LYMPHOCYTES # BLD: 11 % (ref 24–44)
MAGNESIUM: 1.9 MG/DL (ref 1.6–2.6)
MCH RBC QN AUTO: 30.5 PG (ref 26–34)
MCHC RBC AUTO-ENTMCNC: 32.2 G/DL (ref 31–37)
MCV RBC AUTO: 94.8 FL (ref 80–100)
METHEMOGLOBIN: 1.2 % (ref 0–1.9)
MONOCYTES # BLD: 5 % (ref 1–7)
MORPHOLOGY: ABNORMAL
MORPHOLOGY: ABNORMAL
NITRITE, URINE: NEGATIVE
NUCLEATED RED BLOOD CELLS: 2 PER 100 WBC
O2 DEVICE/FLOW/%: ABNORMAL
O2 SAT, ARTERIAL: 90.5 % (ref 95–98)
PARTIAL THROMBOPLASTIN TIME: 31.7 SEC (ref 24–36)
PATIENT TEMP: 37
PCO2 ARTERIAL: 33.6 MMHG (ref 35–45)
PDW BLD-RTO: 18.5 % (ref 11.5–14.9)
PH ARTERIAL: 7.52 (ref 7.35–7.45)
PH UA: 5.5 (ref 5–8)
PLATELET # BLD: 203 K/UL (ref 150–450)
PMV BLD AUTO: 7.3 FL (ref 6–12)
PO2 ARTERIAL: 68.7 MMHG (ref 80–100)
POSITIVE BASE EXCESS, ART: 4.9 MMOL/L (ref 0–2)
POTASSIUM SERPL-SCNC: 3.7 MMOL/L (ref 3.7–5.3)
POTASSIUM SERPL-SCNC: 4.8 MMOL/L (ref 3.7–5.3)
PROCALCITONIN: 0.65 NG/ML
PROTEIN UA: ABNORMAL
PROTHROMBIN TIME: 21 SEC (ref 11.8–14.6)
PT. POSITION: ABNORMAL
RBC # BLD: 1.59 M/UL (ref 4–5.2)
RBC UA: ABNORMAL /HPF
RESPIRATORY RATE: 26
SAMPLE SITE: ABNORMAL
SEG NEUTROPHILS: 79 % (ref 36–66)
SEGMENTED NEUTROPHILS ABSOLUTE COUNT: 5.41 K/UL (ref 1.3–9.1)
SODIUM BLD-SCNC: 137 MMOL/L (ref 135–144)
SODIUM BLD-SCNC: 140 MMOL/L (ref 135–144)
SPECIFIC GRAVITY UA: 1.02 (ref 1–1.03)
TIME, STOOL #1: ABNORMAL
TOTAL PROTEIN: 4.2 G/DL (ref 6.4–8.3)
TROPONIN, HIGH SENSITIVITY: 65 NG/L (ref 0–14)
TROPONIN, HIGH SENSITIVITY: 67 NG/L (ref 0–14)
TURBIDITY: ABNORMAL
URINE HGB: ABNORMAL
UROBILINOGEN, URINE: NORMAL
VITAMIN B-12: 1074 PG/ML (ref 232–1245)
WBC # BLD: 6.9 K/UL (ref 3.5–11)
WBC UA: ABNORMAL /HPF

## 2022-07-20 PROCEDURE — C9113 INJ PANTOPRAZOLE SODIUM, VIA: HCPCS | Performed by: EMERGENCY MEDICINE

## 2022-07-20 PROCEDURE — 85610 PROTHROMBIN TIME: CPT

## 2022-07-20 PROCEDURE — 36556 INSERT NON-TUNNEL CV CATH: CPT

## 2022-07-20 PROCEDURE — 93010 ELECTROCARDIOGRAM REPORT: CPT | Performed by: INTERNAL MEDICINE

## 2022-07-20 PROCEDURE — 82746 ASSAY OF FOLIC ACID SERUM: CPT

## 2022-07-20 PROCEDURE — 87077 CULTURE AEROBIC IDENTIFY: CPT

## 2022-07-20 PROCEDURE — 80053 COMPREHEN METABOLIC PANEL: CPT

## 2022-07-20 PROCEDURE — P9040 RBC LEUKOREDUCED IRRADIATED: HCPCS

## 2022-07-20 PROCEDURE — 86901 BLOOD TYPING SEROLOGIC RH(D): CPT

## 2022-07-20 PROCEDURE — 78278 ACUTE GI BLOOD LOSS IMAGING: CPT

## 2022-07-20 PROCEDURE — P9017 PLASMA 1 DONOR FRZ W/IN 8 HR: HCPCS

## 2022-07-20 PROCEDURE — 82805 BLOOD GASES W/O2 SATURATION: CPT

## 2022-07-20 PROCEDURE — 2500000003 HC RX 250 WO HCPCS: Performed by: NURSE PRACTITIONER

## 2022-07-20 PROCEDURE — 80048 BASIC METABOLIC PNL TOTAL CA: CPT

## 2022-07-20 PROCEDURE — 82947 ASSAY GLUCOSE BLOOD QUANT: CPT

## 2022-07-20 PROCEDURE — 90935 HEMODIALYSIS ONE EVALUATION: CPT

## 2022-07-20 PROCEDURE — P9047 ALBUMIN (HUMAN), 25%, 50ML: HCPCS | Performed by: INTERNAL MEDICINE

## 2022-07-20 PROCEDURE — 87086 URINE CULTURE/COLONY COUNT: CPT

## 2022-07-20 PROCEDURE — 6370000000 HC RX 637 (ALT 250 FOR IP): Performed by: NURSE PRACTITIONER

## 2022-07-20 PROCEDURE — 84484 ASSAY OF TROPONIN QUANT: CPT

## 2022-07-20 PROCEDURE — 85014 HEMATOCRIT: CPT

## 2022-07-20 PROCEDURE — 99222 1ST HOSP IP/OBS MODERATE 55: CPT | Performed by: INTERNAL MEDICINE

## 2022-07-20 PROCEDURE — P9016 RBC LEUKOCYTES REDUCED: HCPCS

## 2022-07-20 PROCEDURE — 6360000002 HC RX W HCPCS: Performed by: INTERNAL MEDICINE

## 2022-07-20 PROCEDURE — 81001 URINALYSIS AUTO W/SCOPE: CPT

## 2022-07-20 PROCEDURE — 85025 COMPLETE CBC W/AUTO DIFF WBC: CPT

## 2022-07-20 PROCEDURE — 84145 PROCALCITONIN (PCT): CPT

## 2022-07-20 PROCEDURE — 83690 ASSAY OF LIPASE: CPT

## 2022-07-20 PROCEDURE — 93005 ELECTROCARDIOGRAM TRACING: CPT | Performed by: EMERGENCY MEDICINE

## 2022-07-20 PROCEDURE — 82607 VITAMIN B-12: CPT

## 2022-07-20 PROCEDURE — 71045 X-RAY EXAM CHEST 1 VIEW: CPT

## 2022-07-20 PROCEDURE — 6360000002 HC RX W HCPCS: Performed by: EMERGENCY MEDICINE

## 2022-07-20 PROCEDURE — 86900 BLOOD TYPING SEROLOGIC ABO: CPT

## 2022-07-20 PROCEDURE — APPNB30 APP NON BILLABLE TIME 0-30 MINS: Performed by: NURSE PRACTITIONER

## 2022-07-20 PROCEDURE — 2500000003 HC RX 250 WO HCPCS: Performed by: INTERNAL MEDICINE

## 2022-07-20 PROCEDURE — 87040 BLOOD CULTURE FOR BACTERIA: CPT

## 2022-07-20 PROCEDURE — A9560 TC99M LABELED RBC: HCPCS | Performed by: NURSE PRACTITIONER

## 2022-07-20 PROCEDURE — 2580000003 HC RX 258: Performed by: EMERGENCY MEDICINE

## 2022-07-20 PROCEDURE — 86850 RBC ANTIBODY SCREEN: CPT

## 2022-07-20 PROCEDURE — 02HV33Z INSERTION OF INFUSION DEVICE INTO SUPERIOR VENA CAVA, PERCUTANEOUS APPROACH: ICD-10-PCS | Performed by: SURGERY

## 2022-07-20 PROCEDURE — 83735 ASSAY OF MAGNESIUM: CPT

## 2022-07-20 PROCEDURE — 36415 COLL VENOUS BLD VENIPUNCTURE: CPT

## 2022-07-20 PROCEDURE — 86920 COMPATIBILITY TEST SPIN: CPT

## 2022-07-20 PROCEDURE — 94640 AIRWAY INHALATION TREATMENT: CPT

## 2022-07-20 PROCEDURE — 36600 WITHDRAWAL OF ARTERIAL BLOOD: CPT

## 2022-07-20 PROCEDURE — 85018 HEMOGLOBIN: CPT

## 2022-07-20 PROCEDURE — 2500000003 HC RX 250 WO HCPCS

## 2022-07-20 PROCEDURE — 85730 THROMBOPLASTIN TIME PARTIAL: CPT

## 2022-07-20 PROCEDURE — 87186 SC STD MICRODIL/AGAR DIL: CPT

## 2022-07-20 PROCEDURE — 2580000003 HC RX 258: Performed by: NURSE PRACTITIONER

## 2022-07-20 PROCEDURE — 87449 NOS EACH ORGANISM AG IA: CPT

## 2022-07-20 PROCEDURE — 3430000000 HC RX DIAGNOSTIC RADIOPHARMACEUTICAL: Performed by: NURSE PRACTITIONER

## 2022-07-20 PROCEDURE — 99223 1ST HOSP IP/OBS HIGH 75: CPT | Performed by: INTERNAL MEDICINE

## 2022-07-20 PROCEDURE — 5A1D70Z PERFORMANCE OF URINARY FILTRATION, INTERMITTENT, LESS THAN 6 HOURS PER DAY: ICD-10-PCS | Performed by: INTERNAL MEDICINE

## 2022-07-20 PROCEDURE — 2000000000 HC ICU R&B

## 2022-07-20 PROCEDURE — 86927 PLASMA FRESH FROZEN: CPT

## 2022-07-20 PROCEDURE — 36430 TRANSFUSION BLD/BLD COMPNT: CPT

## 2022-07-20 PROCEDURE — 83605 ASSAY OF LACTIC ACID: CPT

## 2022-07-20 RX ORDER — SODIUM CHLORIDE 0.9 % (FLUSH) 0.9 %
5-40 SYRINGE (ML) INJECTION PRN
Status: DISCONTINUED | OUTPATIENT
Start: 2022-07-20 | End: 2022-07-26 | Stop reason: HOSPADM

## 2022-07-20 RX ORDER — ATORVASTATIN CALCIUM 40 MG/1
40 TABLET, FILM COATED ORAL NIGHTLY
Status: DISCONTINUED | OUTPATIENT
Start: 2022-07-20 | End: 2022-07-26 | Stop reason: HOSPADM

## 2022-07-20 RX ORDER — LIDOCAINE HYDROCHLORIDE 10 MG/ML
INJECTION, SOLUTION INFILTRATION; PERINEURAL
Status: COMPLETED
Start: 2022-07-20 | End: 2022-07-20

## 2022-07-20 RX ORDER — AMIODARONE HYDROCHLORIDE 100 MG/1
100 TABLET ORAL DAILY
Status: DISCONTINUED | OUTPATIENT
Start: 2022-07-20 | End: 2022-07-26 | Stop reason: HOSPADM

## 2022-07-20 RX ORDER — 0.9 % SODIUM CHLORIDE 0.9 %
1000 INTRAVENOUS SOLUTION INTRAVENOUS ONCE
Status: COMPLETED | OUTPATIENT
Start: 2022-07-20 | End: 2022-07-20

## 2022-07-20 RX ORDER — ALBUMIN (HUMAN) 12.5 G/50ML
25 SOLUTION INTRAVENOUS ONCE
Status: COMPLETED | OUTPATIENT
Start: 2022-07-20 | End: 2022-07-20

## 2022-07-20 RX ORDER — ALBUTEROL SULFATE 90 UG/1
2 AEROSOL, METERED RESPIRATORY (INHALATION) EVERY 6 HOURS PRN
Status: DISCONTINUED | OUTPATIENT
Start: 2022-07-20 | End: 2022-07-26 | Stop reason: HOSPADM

## 2022-07-20 RX ORDER — METOPROLOL TARTRATE 5 MG/5ML
2.5 INJECTION INTRAVENOUS EVERY 6 HOURS
Status: DISCONTINUED | OUTPATIENT
Start: 2022-07-20 | End: 2022-07-23

## 2022-07-20 RX ORDER — SODIUM CHLORIDE 9 MG/ML
INJECTION, SOLUTION INTRAVENOUS PRN
Status: DISCONTINUED | OUTPATIENT
Start: 2022-07-20 | End: 2022-07-26 | Stop reason: HOSPADM

## 2022-07-20 RX ORDER — SODIUM CHLORIDE 0.9 % (FLUSH) 0.9 %
5-40 SYRINGE (ML) INJECTION EVERY 12 HOURS SCHEDULED
Status: DISCONTINUED | OUTPATIENT
Start: 2022-07-20 | End: 2022-07-26 | Stop reason: HOSPADM

## 2022-07-20 RX ORDER — ACETAMINOPHEN 650 MG/1
650 SUPPOSITORY RECTAL EVERY 6 HOURS PRN
Status: DISCONTINUED | OUTPATIENT
Start: 2022-07-20 | End: 2022-07-26 | Stop reason: HOSPADM

## 2022-07-20 RX ORDER — ONDANSETRON 4 MG/1
4 TABLET, ORALLY DISINTEGRATING ORAL EVERY 8 HOURS PRN
Status: DISCONTINUED | OUTPATIENT
Start: 2022-07-20 | End: 2022-07-26 | Stop reason: HOSPADM

## 2022-07-20 RX ORDER — SODIUM CHLORIDE 0.9 % (FLUSH) 0.9 %
10 SYRINGE (ML) INJECTION PRN
Status: DISCONTINUED | OUTPATIENT
Start: 2022-07-20 | End: 2022-07-22

## 2022-07-20 RX ORDER — ONDANSETRON 2 MG/ML
4 INJECTION INTRAMUSCULAR; INTRAVENOUS EVERY 6 HOURS PRN
Status: DISCONTINUED | OUTPATIENT
Start: 2022-07-20 | End: 2022-07-26 | Stop reason: HOSPADM

## 2022-07-20 RX ORDER — LEVOTHYROXINE SODIUM 0.03 MG/1
25 TABLET ORAL DAILY
Status: DISCONTINUED | OUTPATIENT
Start: 2022-07-20 | End: 2022-07-26 | Stop reason: HOSPADM

## 2022-07-20 RX ORDER — HYDROCORTISONE ACETATE 25 MG/1
25 SUPPOSITORY RECTAL 2 TIMES DAILY
Status: DISCONTINUED | OUTPATIENT
Start: 2022-07-20 | End: 2022-07-26 | Stop reason: HOSPADM

## 2022-07-20 RX ORDER — QUETIAPINE FUMARATE 25 MG/1
25 TABLET, FILM COATED ORAL NIGHTLY
Status: DISCONTINUED | OUTPATIENT
Start: 2022-07-20 | End: 2022-07-26 | Stop reason: HOSPADM

## 2022-07-20 RX ORDER — BUDESONIDE AND FORMOTEROL FUMARATE DIHYDRATE 80; 4.5 UG/1; UG/1
2 AEROSOL RESPIRATORY (INHALATION) 2 TIMES DAILY
Status: DISCONTINUED | OUTPATIENT
Start: 2022-07-20 | End: 2022-07-26 | Stop reason: HOSPADM

## 2022-07-20 RX ORDER — FUROSEMIDE 40 MG/1
80 TABLET ORAL DAILY
Status: DISCONTINUED | OUTPATIENT
Start: 2022-07-20 | End: 2022-07-26 | Stop reason: HOSPADM

## 2022-07-20 RX ORDER — ACETAMINOPHEN 325 MG/1
650 TABLET ORAL EVERY 6 HOURS PRN
Status: DISCONTINUED | OUTPATIENT
Start: 2022-07-20 | End: 2022-07-26 | Stop reason: HOSPADM

## 2022-07-20 RX ADMIN — METOPROLOL TARTRATE 2.5 MG: 5 INJECTION, SOLUTION INTRAVENOUS at 06:27

## 2022-07-20 RX ADMIN — LIDOCAINE HYDROCHLORIDE 200 MG: 10 INJECTION, SOLUTION INFILTRATION; PERINEURAL at 17:40

## 2022-07-20 RX ADMIN — SODIUM CHLORIDE 8 MG/HR: 9 INJECTION, SOLUTION INTRAVENOUS at 19:58

## 2022-07-20 RX ADMIN — ALBUMIN (HUMAN) 25 G: 0.25 INJECTION, SOLUTION INTRAVENOUS at 16:24

## 2022-07-20 RX ADMIN — SODIUM CHLORIDE 8 MG/HR: 9 INJECTION, SOLUTION INTRAVENOUS at 01:53

## 2022-07-20 RX ADMIN — SODIUM CHLORIDE, PRESERVATIVE FREE 10 ML: 5 INJECTION INTRAVENOUS at 21:30

## 2022-07-20 RX ADMIN — METOPROLOL TARTRATE 2.5 MG: 5 INJECTION, SOLUTION INTRAVENOUS at 13:28

## 2022-07-20 RX ADMIN — SODIUM CHLORIDE 8 MG/HR: 9 INJECTION, SOLUTION INTRAVENOUS at 10:29

## 2022-07-20 RX ADMIN — Medication 1.8 ML: at 18:20

## 2022-07-20 RX ADMIN — Medication 1.9 ML: at 18:21

## 2022-07-20 RX ADMIN — Medication 2 PUFF: at 19:51

## 2022-07-20 RX ADMIN — SODIUM CHLORIDE 1000 ML: 9 INJECTION, SOLUTION INTRAVENOUS at 04:02

## 2022-07-20 RX ADMIN — SODIUM CHLORIDE 80 MG: 9 INJECTION, SOLUTION INTRAVENOUS at 01:34

## 2022-07-20 RX ADMIN — HYDROCORTISONE ACETATE 25 MG: 25 SUPPOSITORY RECTAL at 10:29

## 2022-07-20 RX ADMIN — Medication 22.5 MILLICURIE: at 20:40

## 2022-07-20 RX ADMIN — Medication 2 PUFF: at 09:30

## 2022-07-20 RX ADMIN — SODIUM CHLORIDE, PRESERVATIVE FREE 10 ML: 5 INJECTION INTRAVENOUS at 20:40

## 2022-07-20 ASSESSMENT — ENCOUNTER SYMPTOMS
BLOOD IN STOOL: 1
SHORTNESS OF BREATH: 0
COUGH: 1
CONSTIPATION: 0
VOMITING: 0
ABDOMINAL PAIN: 0
WHEEZING: 0
SORE THROAT: 0
BACK PAIN: 0
DIARRHEA: 0
NAUSEA: 0

## 2022-07-20 ASSESSMENT — PAIN SCALES - GENERAL
PAINLEVEL_OUTOF10: 0

## 2022-07-20 ASSESSMENT — PAIN - FUNCTIONAL ASSESSMENT
PAIN_FUNCTIONAL_ASSESSMENT: NONE - DENIES PAIN
PAIN_FUNCTIONAL_ASSESSMENT: NONE - DENIES PAIN

## 2022-07-20 NOTE — ED TRIAGE NOTES
BIB via EMS for c/o dark, tarry stools, x1. Hx anemia. Recent admission to [address] [place] [address] Vs. Current HD, MWF. R chest modesta cath, double lumen. Patient denies any physical complaints. Stage 2 coccyx ulcer noted on assessment.

## 2022-07-20 NOTE — PROGRESS NOTES
Dr. Anna Rivera notified of new patient consult for ESRD. Notified of dialysis on MWF.  MD stated to plan for dialysis today and to add patient to list.

## 2022-07-20 NOTE — DISCHARGE INSTR - COC
Continuity of Care Form    Patient Name: Juana Forde   :  [id number]  MRN:  [id number]  Admit date:  2022  Discharge date:  22    Code Status Order: Full Code   Advance Directives:     Admitting Physician:  Edith Blake MD  PCP: Martine Mancia MD    Discharging Nurse: [address] Unit/Room#:   Discharging Unit Phone Number: [phone]    Emergency Contact:   Extended Emergency Contact Information  Primary Emergency Contact: Luisito Palomino Phone: [phone]  Relation: Child  Secondary Emergency Contact: Melquiades Valdovinos Phone: [phone]  Mobile Phone: [phone]  Relation: Child  Preferred language: English    Past Surgical History:  Past Surgical History:   Procedure Laterality Date    COLONOSCOPY N/A 3/15/2022    COLONOSCOPY DIAGNOSTIC performed by Lulú Matias MD at [facility]      x 3, Dr. Aaron Lara  3/7/2022         IR NONTUNNELED VASCULAR CATHETER  2022    IR NONTUNNELED VASCULAR CATHETER 2022 Yolanda Patti [employer], [place] [facility]    IR TUNNELED CATHETER PLACEMENT GREATER THAN 5 YEARS  2022    IR TUNNELED CATHETER PLACEMENT GREATER THAN 5 YEARS 2022 [facility]    THORACENTESIS  3/10/2022         TONSILLECTOMY AND ADENOIDECTOMY      UPPER GASTROINTESTINAL ENDOSCOPY N/A 3/15/2022    EGD BIOPSY performed by Lulú Matias MD at [address] [place] [address]       Immunization History: There is no immunization history on file for this patient.     Active Problems:  Patient Active Problem List   Diagnosis Code    Vitamin D deficiency E55.9    Venous insufficiency of both lower extremities I87.2    Type 2 diabetes mellitus with stage 4 chronic kidney disease, without long-term current use of insulin (HCC) E11.22, N18.4    Coronary artery disease involving native coronary artery of native heart without angina pectoris I25.10    Iron deficiency anemia D50.9    Stage 4 chronic kidney disease ([facility] [address] N18.4    Colonoscopy refused Z53.20    Pneumococcal vaccination declined Z28.21    Partial deafness of both ears H91.93    Acute on chronic diastolic congestive heart failure (HCC) I50.33    COPD (chronic obstructive pulmonary disease) ([facility] [address] J44.9    CKD stage 4 secondary to hypertension ([facility] [address] I12.9, N18.4    Gout with tophi M1A. 9XX1    Hyperlipidemia with target LDL less than 70 E78.5    Dry skin dermatitis HANDS Z86.4    Toxic metabolic encephalopathy Q87.1    Meningioma, cerebral (HCC) D32.0    Paroxysmal atrial fibrillation (HCC) I48.0    Influenza vaccination declined Z28.21    Recurrent UTI N39.0    Kidney stones N20.0    Diverticulosis K57.90    COVID-19 vaccination declined Z28.21    JOY (acute kidney injury) ([facility] [address] N17.9    Chronic venous stasis dermatitis of both lower extremities I87.2    Symptomatic anemia D64.9    Family history of colon cancer Z80.0    Family history of pancreatic cancer Z80.0    Mild malnutrition ([facility]) E44.1    Decreased strength, endurance, and mobility R53.1, Z74.09, R68.89    Bilateral lower extremity edema R60.0    Leg ulcer, left, with fat layer exposed ([address] D80.403    Lymphedema I89.0    Peripheral vascular disease, unspecified ([facility]) I73.9    Ulcer of right leg, with fat layer exposed ([address] L97.912    Acute kidney injury superimposed on CKD (HCC) N17.9, N18.9    Bradycardia R00.1    Obesity (BMI 30-39. 9) E66.9    TSH elevation R79.89    Chronic ulcer of left leg with fat layer exposed ([address] L97.922    Hyperkalemia E87.5    Shock ([address] R57.9    Acute respiratory failure with hypoxia and hypercapnia ([facility]) J96.01, J96.02    Pulmonary edema cardiac cause ([facility]) I50.1    Dependence on renal dialysis ([address] Z99.2    Nephrotic range proteinuria R80.9    External hemorrhoid K64.4    Dysphagia R13.10    Debility R53.81    Sepsis (HCC) A41.9    Arterial hypotension I95.9    Gastrointestinal hemorrhage K92.2       Isolation/Infection:   Isolation            No Isolation          Patient Infection Status       Infection Onset Added Last Indicated Last Indicated By Review Planned Expiration Resolved Resolved By    None active    Resolved    COVID-19 (Rule Out) 06/27/22 06/27/22 06/27/22 COVID-19, Rapid (Ordered)   06/27/22 Rule-Out Test Resulted    COVID-19 (Rule Out) 03/01/22 03/01/22 03/01/22 COVID-19 & Influenza Combo (Ordered)   03/01/22 Rule-Out Test Resulted    COVID-19 (Rule Out) 02/06/22 02/06/22 02/06/22 COVID-19 & Influenza Combo (Ordered)   02/06/22 Rule-Out Test Resulted    COVID-19 (Rule Out) 07/25/20 07/25/20 07/25/20 COVID-19 (Ordered)   07/25/20 Rule-Out Test Resulted            Nurse Assessment:  Last Vital Signs: BP (!) 152/37   Pulse 96   Temp 97.7 °F (36.5 °C) (Oral)   Resp 20   Wt 139 lb 1.8 oz (63.1 kg)   SpO2 98%   BMI 28.10 kg/m²     Last documented pain score (0-10 scale): Pain Level: 0  Last Weight:   Wt Readings from Last 1 Encounters:   07/20/22 139 lb 1.8 oz (63.1 kg)     Mental Status:  oriented    IV Access:  - None    Nursing Mobility/ADLs:  Walking   Assisted  Transfer  Dependent  Bathing  Dependent  Dressing  Dependent  Toileting  Dependent  Feeding  Assisted  Med Admin  Assisted  Med Delivery   whole    Wound Care Documentation and Therapy:  Wound 07/20/22 Coccyx Stage 2 (Active)   Wound Etiology Pressure Stage 1 07/20/22 1253   Dressing Status Clean;Dry; Intact 07/20/22 1253   Wound Cleansed Not Cleansed 07/20/22 1253   Dressing/Treatment Foam 07/20/22 1253   Wound Assessment Other (Comment) 07/20/22 1253   Drainage Amount Other (Comment) 07/20/22 1253   Drainage Description Other (Comment) 07/20/22 1253   Odor None 07/20/22 1253   Marian-wound Assessment Other (Comment) 07/20/22 1253   Margins Other (Comment) 07/20/22 1253   Wound Thickness Description not for Pressure Injury Partial thickness 07/20/22 0945   Number of days: 0       Wound 07/20/22 Foot Left;Lateral (Active)   Wound Etiology Pressure Unstageable 07/20/22 1253   Dressing Status Clean;Dry; Intact 07/20/22 1253   Wound Cleansed Not Cleansed 07/20/22 1253   Dressing/Treatment Foam 07/20/22 1253   Wound Assessment Other (Comment) 07/20/22 1253   Drainage Amount None 07/20/22 1253   Odor None 07/20/22 1253   Marian-wound Assessment Other (Comment) 07/20/22 1253   Margins Other (Comment) 07/20/22 1253   Number of days: 0        Elimination:  Continence: Bowel: Yes  Bladder: Yes  Urinary Catheter: None   Colostomy/Ileostomy/Ileal Conduit: No       Date of Last BM: 07/24/22    Intake/Output Summary (Last 24 hours) at 7/20/2022 1417  Last data filed at 7/20/2022 1043  Gross per 24 hour   Intake 694.16 ml   Output --   Net 694.16 ml     I/O last 3 completed shifts: In: 573.3 [Blood:523.3; IV Piggyback:50]  Out: -     Safety Concerns: At Risk for Falls    Impairments/Disabilities:      Vision and Hearing    Nutrition Therapy:  Current Nutrition Therapy:   - Oral Diet:  Renal    Routes of Feeding: Oral  Liquids: Thin Liquids  Daily Fluid Restriction: no  Last Modified Barium Swallow with Video (Video Swallowing Test): not done    Treatments at the Time of Hospital Discharge:   Respiratory Treatments: inhalers  Oxygen Therapy:  is not on home oxygen therapy. Ventilator:    - No ventilator support    Rehab Therapies: Physical Therapy and Occupational Therapy  Weight Bearing Status/Restrictions: No weight bearing restrictions  Other Medical Equipment (for information only, NOT a DME order):  walker  Other Treatments: Skilled Nursing assessment and monitoring. Medication education and monitoring per protocol.       Patient's personal belongings (please select all that are sent with patient):  Send with patient    RN SIGNATURE:  Electronically signed by Natasha Tobar RN on 7/25/22 at 5:03 PM EDT    CASE MANAGEMENT/SOCIAL WORK SECTION    Inpatient Status Date: 7/20/2022    Readmission Risk Assessment Score:  Readmission Risk              Risk of Unplanned Readmission:  [id number]           Discharging to Facility/ Agency   [facility]  [address]   Phone [phone]  [facility] [phone]      Dialysis Facility (if applicable)   Name:  Address:  Dialysis Schedule:  Phone:  Fax:    / signature: Electronically signed by Shad Hart RN on 7/20/22 at 2:18 PM EDT    PHYSICIAN SECTION    Prognosis: Guarded    Condition at Discharge: Stable    Rehab Potential (if transferring to Rehab): Guarded    Recommended Labs or Other Treatments After Discharge: ***    Physician Certification: I certify the above information and transfer of Maddy Bradshaw  is necessary for the continuing treatment of the diagnosis listed and that she requires [facility] for greater 30 days.      Update Admission H&P: No change in H&P    PHYSICIAN SIGNATURE:  Electronically signed by Krishan Ibarra MD on 7/26/22 at 8:44 AM EDT

## 2022-07-20 NOTE — PROGRESS NOTES
Writer notified Dr. Dominique Clinton answering service of new consult.  They stated they do not page for non-urgent consults and to add MD to pt's treatment team.

## 2022-07-20 NOTE — ACP (ADVANCE CARE PLANNING)
..Advance Care Planning     Advance Care Planning Activator (Inpatient)  Conversation Note      Date of ACP Conversation: 7/20/2022     Conversation Conducted with:  Healthcare Decision Maker: Next of Kin by law (only applies in absence of above) (name) 4 children: Omaira Charles and Lucinda Otto    ACP Activator: Kaylene Crigler, RN      Current Designated Health Care Decision Maker:     Primary Decision Maker: Katey Ro - Child - [phone]    Primary Decision Maker: Mathew Barberer - [phone]    Primary Decision Maker: Rahul Curtis Child - [phone]    Primary Decision Maker: Josephine Rodriguez - Child - [phone]  Click here to complete Healthcare Decision Makers including section of the Healthcare Decision Maker Relationship (ie \"Primary\")      Care Preferences    Ventilation: \"If you were in your present state of health and suddenly became very ill and were unable to breathe on your own, what would your preference be about the use of a ventilator (breathing machine) if it were available to you? \"      Would the patient desire the use of ventilator (breathing machine)?: yes -at this time    \"If your health worsens and it becomes clear that your chance of recovery is unlikely, what would your preference be about the use of a ventilator (breathing machine) if it were available to you? \"     Would the patient desire the use of ventilator (breathing machine)?: Yes- at this time      Resuscitation  \"CPR works best to restart the heart when there is a sudden event, like a heart attack, in someone who is otherwise healthy. Unfortunately, CPR does not typically restart the heart for people who have serious health conditions or who are very sick. \"    \"In the event your heart stopped as a result of an underlying serious health condition, would you want attempts to be made to restart your heart (answer \"yes\" for attempt to resuscitate) or would you prefer a natural death (answer \"no\" for do not attempt to resuscitate)? \" yes- at this time          Length of ACP Conversation in minutes:  20    Conversation Outcomes:  [x] ACP discussion completed  [] Existing advance directive reviewed with patient; no changes to patient's previously recorded wishes  [] New Advance Directive completed  [] Portable Do Not Rescitate prepared for Provider review and signature  [] POLST/POST/MOLST/MOST prepared for Provider review and signature      Follow-up plan:    [x] Schedule follow-up conversation to continue planning  [] Referred individual to Provider for additional questions/concerns   [] Advised patient/agent/surrogate to review completed ACP document and update if needed with changes in condition, patient preferences or care setting    [] This note routed to one or more involved healthcare providers

## 2022-07-20 NOTE — OP NOTE
Operative Note      Patient: Yane Lang  YOB: 1950  MRN: [id number]              Preoperative diagnosis: Acute GI bleed. Chronic kidney disease. Need for central venous access. Postoperative diagnosis: Same    Procedure: Left internal jugular vein triple-lumen central line insertion under ultrasound guidance    Surgeon: Dr. Damián Alanis    Asst.: None    Anesthesia: Local    Preparation: Chloraprep    EBL: Less than 10 mL    Specimen: None    Procedure: Procedure was discussed with the patient and the family at length. They understood and wanted us to proceed. Case was discussed with pulmonary medicine. Patient was lying in a supine position. Left side of the neck was prepped and draped in usual sterile fashion. Patient had a dialysis catheter on the right side. Left internal jugular vein was visualized using the SonoSite. Under ultrasound guidance using Seldinger technique using local anesthesia left internal jugular vein was accessed without any difficulty under sterile condition. Guidewire was introduced without any difficulty. Dilator was placed over the guidewire. Dilator was removed. Triple-lumen catheter was inserted. Guidewire was removed. Catheter was secured. Ports were aspirated and flushed using saline solution without any difficulty. Sterile dressing was applied. Patient tolerated procedure well. -  Recommendations: Stat portable chest x-ray.

## 2022-07-20 NOTE — PROGRESS NOTES
Pt arrived to floor via stretcher from ED and was transfered to bed. Pt placed on bedside monitor, vitals taken. Call light within reach, and pt educated on its use. Bed in lowest position, and locked. Side rails up x2, bed alarm on.

## 2022-07-20 NOTE — CONSULTS
.  Palliative Care Inpatient Consult    NAME:  Jared Holland  MEDICAL RECORD NUMBER:  [id number]  AGE: 66 y.o. GENDER: female  : 1950  TODAY'S DATE:  2022    Reasons for Consultation:    Provision of information regarding PC and/or hospice philosophies  Complex, time-intensive communication and interdisciplinary psychosocial support  Clarification of goals of care and/or assistance with difficult decision-making  Guidance in regards to resources and transition(s)    PLAN:  -pt known to our service from her stay at [facility]. Vs.   -Pt sleeping. I did not wake her. Pt receiving blood transfusion  -talked with pt's son Ezekiel Wilde at bedside. We discussed patient's wishes and I educated him on code status classifications  -offered support  -will continue to follow to continue conversations with patient      Code Status: Full Code    Members of PC team contributing to this consultation are :  Denis Rick RN    History of Present Illness     The patient is a 66 y.o. Non- / non  female who presents with Melena    Referred to Palliative Care by   [x] Physician   [] Nursing  [] Family Request   [] Other:       She was admitted to the primary service for Gastrointestinal hemorrhage [K92.2]  Gastrointestinal hemorrhage, unspecified gastrointestinal hemorrhage type [K92.2]  Anemia, unspecified type [D64.9]. Her hospital course has been associated with Gastrointestinal hemorrhage.  The patient has a complicated medical history and has been hospitalized since 2022 11:42 PM.    Active Hospital Problems    Diagnosis Date Noted    Gastrointestinal hemorrhage [K92.2] 2022     Priority: Medium    Dependence on renal dialysis [facility]) [Z99.2]      Priority: Medium    Paroxysmal atrial fibrillation ([facility] [address] [I48.0] 2020    CKD stage 4 secondary to hypertension ([facility] [address] [I12.9, N18.4] 2020       Data        Code Status: Full Code     ADVANCED CARE PLANNING:  Patient has capacity for medical decisions: not asked  Health Care Power of : no  Living Will: no     Personal, Social, and Family History  Marital Status:   Living situation:with family:  son- recently staying at [facility]  Vijaya/Spiritual History:   not asked  Psychological Distress: pt sleeping  Does patient understand diagnosis/treatment? not asked  Does family/caregiver understand diagnosis/treatment?  yes    Assessment        Palliative Performance Scale:    ___100% Full ambulation; normal activity and work; no evidence of disease; able to do own self care; normal intake; fully conscious  ___90% Full ambulation; normal activity and work; some evidence of disease; able to do own self care; normal intake; fully conscious  ___80% Full ambulation; normal activity with effort; some evidence of disease; able to do own self care; normal or reduced intake; fully conscious  ___70% Ambulation reduced; unable to perform normal job/work; significant disease; able to do own self care; normal or reduced intake; fully conscious  ___60%  Ambulation reduced; cannot do hobbies/housework; significant disease; occasional assist; intake normal or reduced; fully conscious/some confusion  __x_50%  Mainly sit/lie; can't do any work; extensive disease; considerable assist; intake normal or reduced; fully conscious/some confusion  ___40%  Mainly in bed; extensive disease; mainly assist; intake normal or reduced; fully conscious/ some confusion   ___30%  Bed bound; extensive disease; total care; intake reduced; fully conscious/some confusion  ___20%  Bed bound; extensive disease; total care; intake minimal; drowsy/coma  ___10%  Bed bound; extensive disease; total care; mouth care only; drowsy/coma  ___0       Death       Risk Assessments:    Sylvia Risk Score: [unfilled]    Readmission Risk Score: 28.4        1 Year Mortality Risk Score: @1YEARMORTALITYRISK@     Plan        Palliative Interaction: Pt is known to our service from her stay at [address] [place] [address]. Vs. Pt. sleeping upon my visit today and I did not wake her. Pt's son Enedina Perrin is at bedside. I introduced myself and told him I have met his mother at [facility]. . I told him of our discussions in the past regarding, DPOA, and code status. Charles updates me on patient's events since . . Pt was staying with son before her stay at . Pt has 4 children and all live in town except 1 daughter, who is a physician assisant. I talked to Enedina Perrin about if patient has ever relayed her wishes and he said they have not talked about it. He said the only thing patient has ever said to him is \"she doesn't want her throat cut\". We talked about a trach and what that entails. We discussed if patient has ever relayed her wishes regarding CPR, etc. He said she had not. I explained the different code classifications to him. I offered emotional support and told him we would continue to visit for support. He was appreciative. Education/support to family  Discharge planning/helping to coordinate care  Communications with primary service  Discussing meaning/purpose   Continue with current plan of care  Clarification of medical condition to patient and family  Code status clarified: Full Code  Validating patient/family distress  Continued communication updates    Principle Problem/Diagnosis:  Gastrointestinal hemorrhage [K92.2]  Gastrointestinal hemorrhage, unspecified gastrointestinal hemorrhage type [K92.2]  Anemia, unspecified type [D64.9]    Goals of care evaluation:  The patient goals of care are improve or maintain function/quality of life   Goals of care discussed with:    [] Patient independently    [] Patient and Family    [x] Family or Healthcare DPOA independently    [] Unable to discuss with patient, family/DPOA not present    Code Status  Full Code    Other recommendations:  Please call with any palliative questions or concerns. Palliative Care Team is available via perfect serve or via phone - [phone].      Palliative Care will continue to follow Ms. Ojeda's care as needed. Thank you for allowing Palliative Care to participate in the care of Ms. Ojeda .     Electronically signed by   Denis Webb RN  Palliative Care Team  on 7/20/2022 at 1:13 PM

## 2022-07-20 NOTE — CARE COORDINATION
KARLY spoke with Ricardo Hong from [facility] [address]. Patient is from [facility]. Patient is not a bedhold. Patient can return to facility without authorization [place] [address] [place]. SW continue to follow for discharge plans. Electronically signed by HYUN Stevenson on 7/20/2022 at 9:43 AM     Patient gets Monday, Wednesday, and Friday dialysis at facility. Electronically signed by HYUN Stevenson on 7/20/2022 at 10:14 AM       SW spoke with patient about discharge plans. Patient is agreeable to return to [facility] [address] once medically discharged.  Electronically signed by HYUN Stevenson on 7/20/2022 at 9:46 AM

## 2022-07-20 NOTE — PROGRESS NOTES
Patient currently getting dialysis, nurse called in due to patient \"looking more lethargic\". Patients HR elevated at 120-130's SBP dropped to 80's patient appearing more pale than usually. Patient not very responsive at this time. Orders received per family medicine team to consult Dr. Lety Gomez for possible intubation for airway protection, STAT ABG done. BS 96      Albumin  given dialyis treatment termainted. Pateints -120, color coming back to patient. Patient still confused as she was earlier in day. Patient responding to nursing staff. RN updated Dr. Lety Gomez, Dr. Lety Gomez not in house at moment. ER.  Present in patients room to assess patient.   feels no need to intubate patient at this time, due to patients mentation  coming back. Dr. Maria Esther De La Cruz consulted per pulmonology for central line placement. Family at bedside updated. Patient moved from  room 2013 to ICU 9. Will cont to monitor at this time.

## 2022-07-20 NOTE — CONSULTS
Authorizing Provider   QUEtiapine (SEROQUEL) 25 MG tablet Take 1 tablet by mouth nightly 7/18/22  Yes Nikolay GODOY Blood, DO   furosemide (LASIX) 80 MG tablet Take 1 tablet by mouth in the morning. 7/18/22  Yes Nikolay GODOY Blood, DO   ferrous sulfate (IRON 325) 325 (65 Fe) MG tablet Take 1 tablet by mouth in the morning and at bedtime 7/18/22  Yes Nikolay GODOY Blood, DO   ascorbic acid (VITAMIN C) 500 MG tablet Take 1 tablet by mouth in the morning and 1 tablet before bedtime.  7/18/22  Yes Nikolay GODOY Blood, DO   pantoprazole (PROTONIX) 40 MG tablet Take 1 tablet by mouth every morning (before breakfast) 7/19/22  Yes Nikolay GODOY Blood, DO   epoetin rick-epbx (RETACRIT) 59873 UNIT/ML SOLN injection Inject 0.5 mLs into the skin three times a week 7/18/22  Yes Meri GODOY Blood, DO   amiodarone (PACERONE) 100 MG tablet Take 1 tablet by mouth daily 7/12/22  Yes Gerald Bazzi MD   apixaban (ELIQUIS) 5 MG TABS tablet Take 1 tablet by mouth 2 times daily 7/11/22  Yes Gerald Bazzi MD   hydrocortisone (ANUSOL-HC) 25 MG suppository Place 1 suppository rectally 2 times daily 7/11/22  Yes Gerald Bazzi MD   polyethylene glycol (GLYCOLAX) 17 g packet Take 17 g by mouth 2 times daily 7/11/22 8/10/22 Yes Gerald Bazzi MD   levothyroxine (SYNTHROID) 25 MCG tablet Take 1 tablet by mouth Daily 7/12/22  Yes Gerald Bazzi MD   metoprolol tartrate (LOPRESSOR) 25 MG tablet Take 1 tablet by mouth 2 times daily 6/17/22  Yes Tamiko Gonzalez MD   aspirin EC 81 MG EC tablet Take 1 tablet by mouth daily 6/17/22  Yes Tamiko Gonzalez MD   albuterol sulfate HFA (PROAIR HFA) 108 (90 Base) MCG/ACT inhaler Inhale 2 puffs into the lungs every 6 hours as needed for Wheezing or Shortness of Breath (cough) 6/17/22  Yes Tamiko Gonzalez MD   fluticasone-salmeterol (ADVAIR HFA) 115-21 MCG/ACT inhaler Inhale 2 puffs into the lungs 2 times daily Maintenance inhaler 6/17/22  Yes Tamiko Gonzalez MD   vitamin D (ERGOCALCIFEROL) 1.25 MG (63719 UT) CAPS capsule Take 1 capsule by mouth once a week Sundays 4/28/22  Yes Reva Judd MD   atorvastatin (LIPITOR) 40 MG tablet Take 1 tablet by mouth once daily 2/15/22  Yes Reva Judd MD   desloratadine (CLARINEX) 5 MG tablet Take 1 tablet by mouth once daily 2/15/22  Yes Reva Judd MD   magnesium oxide (MAG-OX) 400 (241.3 Mg) MG TABS tablet Take 1 tablet by mouth twice daily 2/15/22  Yes Reva Judd MD   miconazole (MICOTIN) 2 % powder Apply topically 2 times daily UNDER THE SKIN FOLDS LONG TERM 2/15/22  Yes Reva Judd MD   Multiple Vitamins-Minerals (THERAPEUTIC MULTIVITAMIN-MINERALS) tablet Take 1 tablet by mouth daily 2/15/22  Yes Reva Judd MD   blood glucose test strips (FREESTYLE LITE) strip 1 each by In Vitro route daily As needed. 3/19/21  Yes Reva Judd MD   blood glucose monitor kit and supplies 1 kit by Other route three times daily Dispense Butterfly Elite CBG Device 8/20/19  Yes Coleman Woods MD   amLODIPine (NORVASC) 10 MG tablet Take 1 tablet by mouth nightly 6/11/22 7/18/22  Shruthi Ribeiro MD   Blood Pressure KIT Diagnosis: HTN. Needs to check blood pressure 1-2 times a day until stable, then once a day. Goal blood pressure less than 135/85, and above 110/60. 5/26/22   Reva Judd MD   Nebulizers (COMPRESSOR/NEBULIZER) MISC Nebulizer with compressor. Disposable Med Nebs 2 /month. Reusable Med Nebs 1 per 6 months. Aerosol Mask 1 per month. Replacement Filters 2 per month. All other related supplies as needed per month. Medications being used: Albuterol . 083 unit dose vial. Frequency: every 6 hrs x 4/day . Length of Need: 1  Patient not taking: No sig reported 2/22/22   MD Saba Jiménezc.  Devices (ADJUST FOLD CANE/YORK HANDLE) MISC Use daily for walking 2/22/22   Lorena Haney MD   Handicap Placard MISC by Does not apply route Expires on 12/30/25 2/22/22   Lorena Haney MD   FreeStyle Lancets MISC 1 each by Does not apply route daily Patient needs to contact office before any further refills will be approved 2/15/22   Justina Slater MD   Blood Pressure KIT 1 kit by Does not apply route three times daily  Patient not taking: No sig reported 12/19/19   Tammy Lynn MD     Allergies  is allergic to latex, aleve [naproxen sodium], pioglitazone, claritin [loratadine], keflex [cephalexin], and lisinopril. Family History  family history includes Diabetes in her maternal grandmother, mother, and sister; Heart Disease in her father and mother; High Blood Pressure in her sister. Social History   reports that she quit smoking about 22 years ago. She has a 2.50 pack-year smoking history. She has never used smokeless tobacco. She reports that she does not currently use alcohol. She reports that she does not use drugs. Review of Systems:  All 10 system review was conducted. Please refer to chart. OBJECTIVE:   VITALS:  weight is 139 lb 1.8 oz (63.1 kg). Her oral temperature is 97.7 °F (36.5 °C). Her blood pressure is 152/37 (abnormal) and her pulse is 96. Her respiration is 20 and oxygen saturation is 98%. CONSTITUTIONAL: Pale. Awake alert responding. SKIN: Pale color. Skin otherwise warm to touch. LYMPH: no cervical nodes, no inguinal nodes  HEENT: Head is normocephalic, atraumatic. EOMI, PERRLA  NECK: Supple, symmetrical, trachea midline, no adenopathy, thyroid symmetric, not enlarged and no tenderness, skin normal  CHEST/LUNGS: chest symmetric with normal A/P diameter, normal respiratory rate and rhythm, lungs clear to auscultation without wheezes, rales or rhonchi. No accessory muscle use. Scars None   CARDIOVASCULAR: Heart regular rate and rhythm Normal S1 and S2. . Carotid and femoral pulses 2+/4 and equal bilaterally  ABDOMEN: Soft nondistended nontender   RECTAL: Deferred  NEUROLOGIC: Deferred  EXTREMITIES: no cyanosis, no clubbing, and no edema    LABS:   CBC with Differential:    Lab Results   Component Value Date/Time WBC 6.9 07/20/2022 12:30 AM    RBC 1.59 07/20/2022 12:30 AM    HGB 8.7 07/20/2022 03:13 PM    HCT 27.2 07/20/2022 03:13 PM     07/20/2022 12:30 AM    MCV 94.8 07/20/2022 12:30 AM    MCH 30.5 07/20/2022 12:30 AM    MCHC 32.2 07/20/2022 12:30 AM    RDW 18.5 07/20/2022 12:30 AM    NRBC 2 07/20/2022 12:30 AM    LYMPHOPCT 11 07/20/2022 12:30 AM    MONOPCT 5 07/20/2022 12:30 AM    BASOPCT 0 07/20/2022 12:30 AM    MONOSABS 0.34 07/20/2022 12:30 AM    LYMPHSABS 0.76 07/20/2022 12:30 AM    EOSABS 0.21 07/20/2022 12:30 AM    BASOSABS 0.00 07/20/2022 12:30 AM    DIFFTYPE NOT REPORTED 02/06/2022 10:11 AM     BMP:    Lab Results   Component Value Date/Time     07/20/2022 03:11 PM    K 3.7 07/20/2022 03:11 PM     07/20/2022 03:11 PM    CO2 27 07/20/2022 03:11 PM    BUN 62 07/20/2022 03:11 PM    LABALBU 1.9 07/20/2022 12:30 AM    CREATININE 1.72 07/20/2022 03:11 PM    CALCIUM 8.6 07/20/2022 03:11 PM    GFRAA 35 07/20/2022 03:11 PM    LABGLOM 29 07/20/2022 03:11 PM    GLUCOSE 114 07/20/2022 03:11 PM     Hepatic Function Panel:    Lab Results   Component Value Date/Time    ALKPHOS 93 07/20/2022 12:30 AM    ALT 7 07/20/2022 12:30 AM    AST 15 07/20/2022 12:30 AM    PROT 4.2 07/20/2022 12:30 AM    BILITOT 0.60 07/20/2022 12:30 AM    BILIDIR <0.08 06/27/2022 02:59 PM    IBILI Can not be calculated 06/27/2022 02:59 PM    LABALBU 1.9 07/20/2022 12:30 AM     Calcium:    Lab Results   Component Value Date/Time    CALCIUM 8.6 07/20/2022 03:11 PM     Magnesium:    Lab Results   Component Value Date/Time    MG 1.9 07/20/2022 12:30 AM     Phosphorus:    Lab Results   Component Value Date/Time    PHOS 3.1 07/13/2022 08:29 AM     PT/INR:    Lab Results   Component Value Date/Time    PROTIME 21.0 07/20/2022 12:30 AM    INR 1.8 07/20/2022 12:30 AM     ABG:    Lab Results   Component Value Date/Time    PHART 7.524 07/20/2022 04:08 PM    GXV0JRT 33.6 07/20/2022 04:08 PM    PO2ART 68.7 07/20/2022 04:08 PM    WXN9SOB 27.7 07/20/2022 04:08 PM    Z6WCBATI 90.5 07/20/2022 04:08 PM     Urine Culture:  No components found for: CURINE  Blood Culture:  No components found for: CBLOOD, CFUNGUSBL  Stool Culture:  No components found for: CSTOOL    RADIOLOGY:   I have personally reviewed the following films:  XR CHEST PORTABLE    Result Date: 7/20/2022  EXAMINATION: ONE XRAY VIEW OF THE CHEST 7/20/2022 4:15 pm COMPARISON: Chest x-ray dated 19 July 2022 HISTORY: ORDERING SYSTEM PROVIDED HISTORY: Follow up, respiratory distress TECHNOLOGIST PROVIDED HISTORY: Follow up, respiratory distress Reason for Exam: Follow up, respiratory distress FINDINGS: right-sided central venous catheter with the tip in the SVC. Moderate right pleural effusion with right basilar airspace opacities appear stable. No pneumothorax. Stable cardiomegaly     Stable exam with moderate right-sided pleural effusion with right basilar airspace disease. XR CHEST PORTABLE    Result Date: 7/20/2022  EXAMINATION: ONE XRAY VIEW OF THE CHEST 7/19/2022 11:57 pm COMPARISON: Prior studies including 06/30/2022 and 07/14/2022 HISTORY: ORDERING SYSTEM PROVIDED HISTORY: pain TECHNOLOGIST PROVIDED HISTORY: pain Reason for Exam: CP and melena X several days FINDINGS: Pulmonary edema has improved from 07/14/2022. There is a persistent moderate right pleural effusion with right basilar consolidation. The heart is enlarged and unchanged. Prior sternal splitting procedure. A right IJ tunneled dialysis catheter remains in good position. Pulmonary edema has improved. Persistent moderate-sized right pleural effusion with basilar consolidation. IMPRESSION:   GI bleed  Anemia secondary to acute blood loss. Chronic kidney disease on hemodialysis. Need for central line access for medications and transfusion of blood and fresh frozen plasma. does not have any pertinent problems on file. PLAN:   Will proceed with central line placement at the bedside as requested. Discussed with patient's family. They understand and agree with the current management plan. Thank you for this interesting consult and for allowing us to participate in the care of this patient. If you have any questions please don't hesitate to call.           Electronically signed by Trey Tavarez MD  on 7/20/2022 at 5:59 PM

## 2022-07-20 NOTE — PROGRESS NOTES
[facility]    PROGRESS NOTE             7/20/2022    1:44 PM    Name:   Nikhil Peacock  MRN:     [id number]     Acct:      [de-identified]   Room:   2013/2013-01  IP Day:  0  Admit Date:  7/19/2022 11:42 PM    PCP:  Shila Martin MD  Code Status:  Full Code    Subjective:     C/C:   Chief Complaint   Patient presents with    Melena     Interval History Status: No change. Patient seen and examined at bedside with presence of nurse  Ms. Ojeda just received her second packed RBC when I saw her, hemoglobin today was 4.9, patient had a blood pressure of averaging 146-166 this morning     No events overnight, patient states that per daughter this seems more confused than she is however this has been the baseline since he came here. Not complain of any pain, change in breathing habits  When asked she does not have any vomiting nausea change in bowel habits. Patient was hard to arouse, seems drowsy patient is oriented to date and self only, very pale patient, dental hygiene somehow poor, patient has pale mucous membranes as well    Patient had a clear lung fields, normal heart sounds    Brief History:     The patient is a 66 y.o.  female, with a history of COPD, ESRD on dialysis MWF and A-fib. Patient has an extensive medical history and was transported to the ED from [facility] after being in the SNF for only 1 day. She was admitted to [address] [place] [address] 6/27-7/18 admitted for encephalopathy, hypoxia requiring mechanical ventilation and bradycardia requiring atropine and transcutaneous pacing. She sustained JOY on top of Stage 4 CKD resulting in hyperkalemia and she is now dialysis dependent. During hospitalization she had multiple episodes of recurrent anemia requiring transfusion of PRBC. Despite recurrent anemia she was discharged to SNF on eliquis. She presented to the ED due to acute rectal bleeding.  Daughter is at bedside and reports that when she went to the SNF to see her mother she noticed that the patient was lethargic and more confused. Patient has had dark stools from taking iron but when her brief was changed the aide reported a large amount of bright red blood mixed with stool. Her symptoms are acute and severe with a hemoglobin of 4.9. 2 units of PRBC were ordered in the ED and GI consulted. Symptoms continued to be associated with lethargy and mild confusion. Patient is able to state that she is in the hospital and responds to most questions appropriately in regards to her recent medical history. She does have a chronic dry cough from COPD. Daughter also reports patient having a decreased appetite and feels that patient has lost weight but is uncertain of amount. There are no reported aggravating or alleviating factors. Denies fever, chills, chest pain, abdominal pain, nausea, vomiting, diarrhea, and urinary symptoms. When confirming code status as Full code, her daughter stated that family has been waiting for patient's mentation to improve prior to further discussing her code status and goals of care. Patient has 4 adult children. Review of Systems:   Could not evaluate fully due to patient's status    CONSTITUTIONAL:  no fevers  Psych: Confused/Could not evaluate  RESPIRATORY:No SOB  CARDIOVASCULAR: negative for chest pain  GASTROINTESTINAL: no nausea, no vomiting, no change in bowel habits, no abdominal pain   GENITOURINARY: ESRD On dialysis  NEUROLOGICAL: Nweakness+ could not evaluate fully  ENT: no headaches. Medications: Allergies:     Allergies   Allergen Reactions    Latex Rash    Aleve [Naproxen Sodium]      Chronic kidney disease stage III, CHF    Pioglitazone Other (See Comments)     Congestive heart failure    Claritin [Loratadine]     Keflex [Cephalexin]     Lisinopril      Needs clarification of contraindication       Current Meds:   Scheduled Meds:    [Held by provider] pantoprazole (PROTONIX) 40 mg injection  40 mg IntraVENous Daily    budesonide-formoterol  2 puff Inhalation BID    hydrocortisone  25 mg Rectal BID    [Held by provider] QUEtiapine  25 mg Oral Nightly    [Held by provider] levothyroxine  25 mcg Oral Daily    [Held by provider] furosemide  80 mg Oral Daily    [Held by provider] atorvastatin  40 mg Oral Nightly    [Held by provider] amiodarone  100 mg Oral Daily    metoprolol  2.5 mg IntraVENous Q6H    sodium chloride flush  5-40 mL IntraVENous 2 times per day     Continuous Infusions:    pantoprazole 8 mg/hr (07/20/22 1029)    sodium chloride      sodium chloride       PRN Meds: sodium chloride, albuterol sulfate HFA, sodium chloride flush, sodium chloride, ondansetron **OR** ondansetron, acetaminophen **OR** acetaminophen    Data:     Past Medical History:   has a past medical history of Acute CVA (cerebrovascular accident) ([facility] [address] Acute on chronic combined systolic (congestive) and diastolic (congestive) heart failure ([facility] [address] Arthritis, Asthma, Bradycardia, ESRD (end stage renal disease) ([facility] [address] [facility] Essential hypertension, Hallucinations, Hard of hearing, Head contusion, Iron deficiency anemia, unspecified, Meningioma ([facility] [address] [facility] Myocardial infarction ([facility] [address] Pure hypercholesterolemia, Type 2 diabetes mellitus with stage 4 chronic kidney disease, without long-term current use of insulin ([facility] [address] Unspecified venous (peripheral) insufficiency, and Unspecified vitamin D deficiency. Social History:   reports that she quit smoking about 22 years ago. She has a 2.50 pack-year smoking history. She has never used smokeless tobacco. She reports that she does not currently use alcohol. She reports that she does not use drugs.      Family History:   Family History   Problem Relation Age of Onset    Diabetes Mother     Heart Disease Mother     Heart Disease Father     Diabetes Sister     High Blood Pressure Sister     Diabetes Maternal Grandmother        Vitals:  BP (!) 152/37   Pulse 96 Temp 97.7 °F (36.5 °C) (Oral)   Resp 20   Wt 139 lb 1.8 oz (63.1 kg)   SpO2 98%   BMI 28.10 kg/m²   Temp (24hrs), Av °F (36.7 °C), Min:97.5 °F (36.4 °C), Max:98.7 °F (37.1 °C)      Recent Labs     22  1923 22  0655 22  1100 22  1154   POCGLU 113* 86 57* 100       I/O(24Hr): Intake/Output Summary (Last 24 hours) at 2022 1344  Last data filed at 2022 1043  Gross per 24 hour   Intake 694.16 ml   Output --   Net 694.16 ml         Lab Results   Component Value Date/Time    SPECIAL NOT GIVEN 2022 01:11 PM     Lab Results   Component Value Date/Time    CULTURE NO GROWTH 5 DAYS 2022 05:12 AM    CULTURE NO GROWTH 5 DAYS 2022 05:12 AM         Radiology:    XR CHEST (SINGLE VIEW FRONTAL)    Result Date: 2022  EXAMINATION: ONE XRAY VIEW OF THE CHEST 2022 2:44 pm COMPARISON: Chest x-ray dated 10 July 2022 HISTORY: ORDERING SYSTEM PROVIDED HISTORY: screening TECHNOLOGIST PROVIDED HISTORY: screening FINDINGS: Stable right-sided dialysis catheter. Stable cardiomegaly with bilateral pleural effusions and bibasilar airspace opacities. Stable findings of CHF. XR CHEST PORTABLE    Result Date: 2022  EXAMINATION: ONE XRAY VIEW OF THE CHEST 2022 11:57 pm COMPARISON: Prior studies including 2022 and 2022 HISTORY: ORDERING SYSTEM PROVIDED HISTORY: pain TECHNOLOGIST PROVIDED HISTORY: pain Reason for Exam: CP and melena X several days FINDINGS: Pulmonary edema has improved from 2022. There is a persistent moderate right pleural effusion with right basilar consolidation. The heart is enlarged and unchanged. Prior sternal splitting procedure. A right IJ tunneled dialysis catheter remains in good position. Pulmonary edema has improved. Persistent moderate-sized right pleural effusion with basilar consolidation.          Physical Examination:        PHYSICAL EXAM:  General Appearance  not awake, difficult to arouse, Oriented by 2 (date and name)   Lungs - Bilateral equal air entry, no wheezes, rales or rhonchi, aeration good  Cardiovascular - Heart sounds are normal.  irregular rhythm, normal rate without murmur, gallop or rub. Abdomen - Soft, nontender, nondistended, no masses or organomegaly  Neurologic - There are no new focal motor or sensory deficits  Skin - pale  Extremities - No cyanosis, clubbing or edema  neurology: patient is week in all limbs    Assessment:        Primary Problem  Gastrointestinal hemorrhage    Active Hospital Problems    Diagnosis Date Noted    Gastrointestinal hemorrhage [K92.2] 07/20/2022     Priority: Medium    Dependence on renal dialysis [facility]) [Z99.2]      Priority: Medium    Paroxysmal atrial fibrillation ([facility] [I48.0] 07/28/2020    CKD stage 4 secondary to hypertension ([facility] [I12.9, N18.4] 02/20/2020       Plan:        Lower GI bleed(Melena)  -GI consulted  -Patient presented with Hb 4.9. Received 2 pRBC for now. -IV Protonix bolus was given with continuous infusion  -Anticoagulation/Antiplatelets Held  --NPO    Atrial Fibrillation  -Patient had Atrial Fibrillation at ED  -Hold eliquis  -Change to metoprolol injection 2.5 Q6H(NPO)  -Hold Amiodarone    ESRD   -Nephrology on board  -Patient will get acute dialysis today  -On Wednesday/Friday/Monday dialysis schedule. -Dialysis planned 3 times  aweek.     Hypothyroidism  -Hold Levothyroxine    DVT prophylaxis: N/A patient is bleeding(Eliqus held)  GI prophylaxis: Protonix 40 mg daily    OT/PT      Maryjane Uriostegui MD  7/20/2022  1:44 PM

## 2022-07-20 NOTE — FLOWSHEET NOTE
Writer got update from [employer], [address] and spoke to pt's son Damari Campuzano in the waiting room. He spoke of the good relationships with is siblings and that they will work together to do [employer]" for the patient. They are hoping to get patient stronger and \"more time. \" Writer assured him that chaplains would support them any way they need. He expressed his appreciation. 07/20/22 1521   Encounter Summary   Encounter Overview/Reason  Spiritual/Emotional Needs   Service Provided For: Family   Referral/Consult From: Palliative Care   Support System Children   Last Encounter  07/20/22   Complexity of Encounter Moderate   Spiritual/Emotional needs   Type Spiritual Support   Palliative Care   Type Palliative Care, Family Care   Assessment/Intervention/Outcome   Assessment Calm   Intervention Active listening;Discussed illness injury and its impact; Explored/Affirmed feelings, thoughts, concerns;Sustaining Presence/Ministry of presence   Outcome Engaged in conversation;Expressed feelings, needs, and concerns;Expressed Gratitude;Receptive

## 2022-07-20 NOTE — H&P
[address] [facility]     HISTORY AND PHYSICAL EXAMINATION            Date:   7/20/2022  Patientname:  Naomie Ojeda  Date of admission:  7/19/2022 11:42 PM  MRN:   [id number]  Account:  [de-identified]  YOB: 1950  PCP:    Monique Boss MD  Room:   11/11  Code Status:    Full Code    CHIEF COMPLAINT     Chief Complaint   Patient presents with    Melena       HISTORY OF PRESENT ILLNESS  (Character, Onset, Location, Duration,  Exacerbating/RelievingFactors, Radiation,   Associated Symptoms, Severity )      The patient is a 66 y.o.  female, with a history of COPD, ESRD on dialysis MWF and A-fib. Patient has an extensive medical history and was transported to the ED from [address] [place] [address] [address] after being in the SNF for only 1 day. She was admitted to [address] [place] [address] 6/27-7/18 admitted for encephalopathy, hypoxia requiring mechanical ventilation and bradycardia requiring atropine and transcutaneous pacing. She sustained JOY on top of Stage 4 CKD resulting in hyperkalemia and she is now dialysis dependent. During hospitalization she had multiple episodes of recurrent anemia requiring transfusion of PRBC. Despite recurrent anemia she was discharged to SNF on eliquis. She presented to the ED due to acute rectal bleeding. Daughter is at bedside and reports that when she went to the SNF to see her mother she noticed that the patient was lethargic and more confused. Patient has had dark stools from taking iron but when her brief was changed the aide reported a large amount of bright red blood mixed with stool. Her symptoms are acute and severe with a hemoglobin of 4.9. 2 units of PRBC were ordered in the ED and GI consulted. Symptoms continued to be associated with lethargy and mild confusion. Patient is able to state that she is in the hospital and responds to most questions appropriately in regards to her recent medical history. She does have a chronic dry cough from COPD. Daughter also reports patient having a decreased appetite and feels that patient has lost weight but is uncertain of amount. There are no reported aggravating or alleviating factors. Denies fever, chills, chest pain, abdominal pain, nausea, vomiting, diarrhea, and urinary symptoms. When confirming code status as Full code, her daughter stated that family has been waiting for patient's mentation to improve prior to further discussing her code status and goals of care. Patient has 4 adult children. PAST MEDICAL HISTORY   Patient  has a past medical history of Acute CVA (cerebrovascular accident) ([address] Acute on chronic combined systolic (congestive) and diastolic (congestive) heart failure ([address] Arthritis, Asthma, Bradycardia, ESRD (end stage renal disease) ([address] Essential hypertension, Hallucinations, Hard of hearing, Head contusion, Iron deficiency anemia, unspecified, Meningioma ([address] Myocardial infarction ([address] Pure hypercholesterolemia, Type 2 diabetes mellitus with stage 4 chronic kidney disease, without long-term current use of insulin ([address] Unspecified venous (peripheral) insufficiency, and Unspecified vitamin D deficiency. PAST SURGICAL HISTORY    Patient  has a past surgical history that includes Tonsillectomy and adenoidectomy; Coronary artery bypass graft; intubation (3/7/2022); Thoracentesis (3/10/2022); Upper gastrointestinal endoscopy (N/A, 3/15/2022); Colonoscopy (N/A, 3/15/2022); IR NONTUNNELED VASCULAR CATHETER > 5 YEARS (6/28/2022); and IR TUNNELED CVC PLACE WO SQ PORT/PUMP > 5 YEARS (7/8/2022). FAMILY HISTORY    Patient family history includes Diabetes in her maternal grandmother, mother, and sister; Heart Disease in her father and mother; High Blood Pressure in her sister. SOCIAL HISTORY    Patient  reports that she quit smoking about 22 years ago. She has a 2.50 pack-year smoking history.  She has never used smokeless tobacco. She reports that she does not currently use alcohol. She reports that she does not use drugs. HOME MEDICATIONS        Prior to Admission medications    Medication Sig Start Date End Date Taking? Authorizing Provider   QUEtiapine (SEROQUEL) 25 MG tablet Take 1 tablet by mouth nightly 7/18/22  Yes Nikolay GODOY Blood, DO   furosemide (LASIX) 80 MG tablet Take 1 tablet by mouth in the morning. 7/18/22  Yes Nikolay GODOY Blood, DO   ferrous sulfate (IRON 325) 325 (65 Fe) MG tablet Take 1 tablet by mouth in the morning and at bedtime 7/18/22  Yes Nikolay GODOY Blood, DO   ascorbic acid (VITAMIN C) 500 MG tablet Take 1 tablet by mouth in the morning and 1 tablet before bedtime.  7/18/22  Yes Nikolay GODOY Blood, DO   pantoprazole (PROTONIX) 40 MG tablet Take 1 tablet by mouth every morning (before breakfast) 7/19/22  Yes Nikolay GODOY Blood, DO   epoetin rick-epbx (RETACRIT) 93194 UNIT/ML SOLN injection Inject 0.5 mLs into the skin three times a week 7/18/22  Yes Merlin GODOY Blood, DO   amiodarone (PACERONE) 100 MG tablet Take 1 tablet by mouth daily 7/12/22  Yes Steven Louis MD   apixaban (ELIQUIS) 5 MG TABS tablet Take 1 tablet by mouth 2 times daily 7/11/22  Yes Steven Louis MD   hydrocortisone (ANUSOL-HC) 25 MG suppository Place 1 suppository rectally 2 times daily 7/11/22  Yes Steven Louis MD   polyethylene glycol (GLYCOLAX) 17 g packet Take 17 g by mouth 2 times daily 7/11/22 8/10/22 Yes Steven Louis MD   levothyroxine (SYNTHROID) 25 MCG tablet Take 1 tablet by mouth Daily 7/12/22  Yes Steven Louis MD   metoprolol tartrate (LOPRESSOR) 25 MG tablet Take 1 tablet by mouth 2 times daily 6/17/22  Yes Felipe Morris MD   aspirin EC 81 MG EC tablet Take 1 tablet by mouth daily 6/17/22  Yes Felipe Morris MD   albuterol sulfate HFA (PROAIR HFA) 108 (90 Base) MCG/ACT inhaler Inhale 2 puffs into the lungs every 6 hours as needed for Wheezing or Shortness of Breath (cough) 6/17/22  Yes Felipe Morris MD   fluticasone-salmeterol (ADVAIR HFA) 115-21 MCG/ACT inhaler Inhale 2 puffs into the lungs 2 times daily Maintenance inhaler 6/17/22  Yes Angy Mckenna MD   vitamin D (ERGOCALCIFEROL) 1.25 MG (64417 UT) CAPS capsule Take 1 capsule by mouth once a week Sundays 4/28/22  Yes Angy Mckenna MD   atorvastatin (LIPITOR) 40 MG tablet Take 1 tablet by mouth once daily 2/15/22  Yes Angy Mckenna MD   desloratadine (CLARINEX) 5 MG tablet Take 1 tablet by mouth once daily 2/15/22  Yes Angy Mckenna MD   magnesium oxide (MAG-OX) 400 (241.3 Mg) MG TABS tablet Take 1 tablet by mouth twice daily 2/15/22  Yes Angy Mckenna MD   miconazole (MICOTIN) 2 % powder Apply topically 2 times daily UNDER THE SKIN FOLDS LONG TERM 2/15/22  Yes Angy Mckenna MD   Multiple Vitamins-Minerals (THERAPEUTIC MULTIVITAMIN-MINERALS) tablet Take 1 tablet by mouth daily 2/15/22  Yes Angy Mckenna MD   blood glucose test strips (FREESTYLE LITE) strip 1 each by In Vitro route daily As needed. 3/19/21  Yes Angy Mckenna MD   blood glucose monitor kit and supplies 1 kit by Other route three times daily Dispense Butterfly Elite CBG Device 8/20/19  Yes Farhat Camp MD   amLODIPine (NORVASC) 10 MG tablet Take 1 tablet by mouth nightly 6/11/22 7/18/22  Joe Aguilar MD   Blood Pressure KIT Diagnosis: HTN. Needs to check blood pressure 1-2 times a day until stable, then once a day. Goal blood pressure less than 135/85, and above 110/60. 5/26/22   Angy Mckenna MD   Nebulizers (COMPRESSOR/NEBULIZER) MISC Nebulizer with compressor. Disposable Med Nebs 2 /month. Reusable Med Nebs 1 per 6 months. Aerosol Mask 1 per month. Replacement Filters 2 per month. All other related supplies as needed per month. Medications being used: Albuterol . 083 unit dose vial. Frequency: every 6 hrs x 4/day . Length of Need: 1  Patient not taking: No sig reported 2/22/22   MD Saba [place].  Devices (ADJUST FOLD CANE/YORK HANDLE) MISC Use daily for walking 2/22/22   Kelechi Lynn MD Handicap Placard MISC by Does not apply route Expires on 12/30/25 2/22/22   Ruth Colmenares MD   FreeStyle Lancets MISC 1 each by Does not apply route daily Patient needs to contact office before any further refills will be approved 2/15/22   Marvel Kenney MD   Blood Pressure KIT 1 kit by Does not apply route three times daily  Patient not taking: No sig reported 12/19/19   Cesar Newman MD       ALLERGIES      Latex, Aleve [naproxen sodium], Pioglitazone, Claritin [loratadine], Keflex [cephalexin], and Lisinopril    REVIEW OF SYSTEMS     Review of Systems   Constitutional:  Positive for appetite change. Negative for chills, diaphoresis and fever. HENT:  Negative for congestion and sore throat. Respiratory:  Positive for cough. Negative for shortness of breath and wheezing. Dry non-productive cough; chronic   Cardiovascular:  Negative for chest pain, palpitations and leg swelling. Gastrointestinal:  Positive for blood in stool. Negative for abdominal pain, constipation, diarrhea, nausea and vomiting. Genitourinary:  Positive for decreased urine volume. Negative for dysuria, frequency and urgency. ESRD on dialysis    Musculoskeletal:  Negative for back pain and myalgias. Skin:  Positive for pallor. Negative for rash. Neurological:  Positive for weakness. Negative for dizziness and headaches. Generalized weakness    Psychiatric/Behavioral:  Positive for confusion. The patient is not nervous/anxious. Mild confusion, oriented to person and place   PHYSICAL EXAM      BP (!) 100/40   Pulse 97   Temp 98 °F (36.7 °C)   Resp 27   Wt 133 lb (60.3 kg)   SpO2 95%   BMI 26.86 kg/m²  Body mass index is 26.86 kg/m². Physical Exam  Vitals and nursing note reviewed. Constitutional:       General: She is not in acute distress. Appearance: She is well-developed. She is not diaphoretic.       Comments: Lethargic, oriented to person/place   HENT:      Head: Normocephalic and atraumatic. Comments: [place]  Eyes:      Conjunctiva/sclera: Conjunctivae normal.      Pupils: Pupils are equal, round, and reactive to light. Neck:      Trachea: No tracheal deviation. Cardiovascular:      Rate and Rhythm: Rhythm irregular. Heart sounds: Normal heart sounds. No murmur heard. No friction rub. No gallop. Pulmonary:      Effort: Pulmonary effort is normal. No respiratory distress. Breath sounds: Normal breath sounds. No wheezing or rales. Chest:      Chest wall: No tenderness. Abdominal:      General: Bowel sounds are normal. There is no distension. Palpations: Abdomen is soft. Tenderness: There is no abdominal tenderness. There is no guarding. Genitourinary:     Rectum: Guaiac result positive. Musculoskeletal:         General: No tenderness. Normal range of motion. Cervical back: Normal range of motion and neck supple. Lymphadenopathy:      Cervical: No cervical adenopathy. Skin:     General: Skin is warm and dry. Coloration: Skin is pale. Findings: No erythema or rash. Neurological:      Mental Status: She is alert. Motor: Weakness present. No seizure activity. Coordination: Coordination normal.      Comments: Generalized weakness, mild confusion   Psychiatric:         Behavior: Behavior normal.         Thought Content: Thought content normal.     DIAGNOSTICS     EKG Interpretation    Interpreted by emergency department physician    Collected: 07/20/22 0009    Updated: 07/20/22 0010     Ventricular Rate 95 BPM    Atrial Rate 76 BPM    QRS Duration 98 ms    Q-T Interval 376 ms    QTc Calculation (Bazett) 472 ms    R Axis 10 degrees    T Axis -137 degrees   Narrative:     Atrial fibrillation   Nonspecific ST and T wave abnormality   Prolonged QT   Abnormal ECG   When compared with ECG of 12-JUN-2022 17:57,   Atrial fibrillation has replaced Sinus rhythm   Vent.  rate has increased BY  54 BPM   Non-specific change in ST segment in Anterior leads   Nonspecific T wave abnormality now evident in Lateral leads   QT has lengthened       Labs:  CBC:   Recent Labs     07/17/22  0825 07/17/22  1646 07/18/22  0947 07/20/22  0030   WBC 6.9  --  10.3 6.9   HGB 6.3* 7.2* 7.5* 4.9*     --  210 203     BMP:    Recent Labs     07/17/22  0825 07/18/22  0947 07/20/22  0030    141 137   K 3.7 4.5 4.8    103 101   CO2 31 28 26   BUN 30* 62* 89*   CREATININE 1.73* 2.24* 2.86*   GLUCOSE 99 131* 126*     S. Calcium:  Recent Labs     07/20/22  0030   CALCIUM 8.0*     S. Ionized Calcium:No results for input(s): IONCA in the last 72 hours. S. Magnesium:  Recent Labs     07/20/22  0030   MG 1.9     S. Phosphorus:No results for input(s): PHOS in the last 72 hours. S. Glucose:  Recent Labs     07/18/22  0655 07/18/22  1100 07/18/22  1154   POCGLU 86 57* 100     Glycosylated hemoglobin A1C:   Lab Results   Component Value Date/Time    LABA1C 4.6 03/31/2022 11:21 AM     Hepatic:   Recent Labs     07/20/22  0030   AST 15   ALT 7   ALKPHOS 93     CARDIAC ENZY:   Recent Labs     07/20/22  0030 07/20/22  0235   TROPHS 67* 65*     INR:   Recent Labs     07/20/22  0030   INR 1.8     BNP: No results for input(s): PROBNP in the last 72 hours. ABGs: No results for input(s): PH, PCO2, PO2, HCO3, O2SAT in the last 72 hours. Lipids: No results for input(s): CHOL, TRIG, HDL, LDL, LDLCALC in the last 72 hours. Pancreatic functions:  Recent Labs     07/20/22  0030   LIPASE 18     S. LacticAcid: No results for input(s): LACTA in the last 72 hours. Thyroid functions:   Lab Results   Component Value Date/Time    TSH 9.54 06/27/2022 12:44 PM      U/A:No results for input(s): NITRITE, COLORU, WBCUA, RBCUA, MUCUS, BACTERIA, CLARITYU, SPECGRAV, LEUKOCYTESUR, BLOODU, GLUCOSEU, AMORPHOUS in the last 72 hours. Invalid input(s): Regina Salmeron  No results for input(s): COVID19 in the last 72 hours.   Imaging/Diagonstics:     XR CHEST (SINGLE VIEW FRONTAL)    Result Date: 7/14/2022  EXAMINATION: ONE XRAY VIEW OF THE CHEST 7/14/2022 2:44 pm COMPARISON: Chest x-ray dated 10 July 2022 HISTORY: ORDERING SYSTEM PROVIDED HISTORY: screening TECHNOLOGIST PROVIDED HISTORY: screening FINDINGS: Stable right-sided dialysis catheter. Stable cardiomegaly with bilateral pleural effusions and bibasilar airspace opacities. Stable findings of CHF. CT HEAD WO CONTRAST    Result Date: 6/27/2022  EXAMINATION: CT OF THE HEAD WITHOUT CONTRAST  6/27/2022 10:20 am TECHNIQUE: CT of the head was performed without the administration of intravenous contrast. Automated exposure control, iterative reconstruction, and/or weight based adjustment of the mA/kV was utilized to reduce the radiation dose to as low as reasonably achievable. COMPARISON: Head CT 03/07/2022, 02/17/2021 HISTORY: ORDERING SYSTEM PROVIDED HISTORY: AMS TECHNOLOGIST PROVIDED HISTORY: AMS Decision Support Exception - unselect if not a suspected or confirmed emergency medical condition->Emergency Medical Condition (MA) Reason for Exam: ams FINDINGS: BRAIN/VENTRICLES: Examination is mildly motion degraded. There is no acute intracranial hemorrhage or midline shift. No abnormal extra-axial fluid collection with an unchanged 2.5 cm transverse by 2.6 cm AP extra-axial mass in the anterior right temporal region compatible with a meningioma with some peripheral rim mineralization. There is mass effect on the anterior right temporal lobe with suggestion of increased surrounding edema. Partial empty sella, unchanged from prior. Encephalomalacia from small remote insults in the right caudate and left basal ganglia region. The gray-white differentiation is otherwise maintained. Parenchymal atrophy which is greatest in frontal lobes. There is no evidence of hydrocephalus. ORBITS: The visualized portion of the orbits demonstrate no acute abnormality.  SINUSES: The visualized paranasal sinuses and mastoid air cells demonstrate no acute abnormality. SOFT TISSUES/SKULL:  No acute abnormality of the visualized skull or soft tissues. No acute intracranial hemorrhage or hydrocephalus. Background mild chronic small vessel white matter changes with remote lacunar insults in the right caudate and left basal ganglia, unchanged from prior. 2.6 cm anterior right temporal meningioma with suggested mild increase in the vasogenic edema along the anterior right temporal lobe relative to 4 months prior. This could be further assessed with a contrast-enhanced brain MRI. US RENAL COMPLETE    Result Date: 6/28/2022  EXAMINATION: RETROPERITONEAL ULTRASOUND OF THE KIDNEYS AND URINARY BLADDER 6/28/2022 COMPARISON: Ultrasound on 10/20/2020 HISTORY: Acute on chronic renal disease. FINDINGS: Kidneys: The right kidney measures 8.9 cm in length and the left kidney measures 11.6 cm in length. Kidneys demonstrate increased cortical echogenicity. No hydronephrosis. 1.4 cm right renal stone. Small renal cysts measure up to 1.3 cm. Bladder: Decompressed by a Stanford catheter. Chronic renal disease. Small renal cysts. 1.4 cm right renal stone. No hydronephrosis. XR CHEST PORTABLE    Result Date: 7/20/2022  EXAMINATION: ONE XRAY VIEW OF THE CHEST 7/19/2022 11:57 pm COMPARISON: Prior studies including 06/30/2022 and 07/14/2022 HISTORY: ORDERING SYSTEM PROVIDED HISTORY: pain TECHNOLOGIST PROVIDED HISTORY: pain Reason for Exam: CP and melena X several days FINDINGS: Pulmonary edema has improved from 07/14/2022. There is a persistent moderate right pleural effusion with right basilar consolidation. The heart is enlarged and unchanged. Prior sternal splitting procedure. A right IJ tunneled dialysis catheter remains in good position. Pulmonary edema has improved. Persistent moderate-sized right pleural effusion with basilar consolidation.      XR CHEST PORTABLE    Result Date: 7/11/2022  EXAMINATION: ONE X-RAY VIEW OF THE CHEST 7/10/2022 2:21 pm COMPARISON: 07/03/2022 HISTORY: ORDERING SYSTEM PROVIDED HISTORY:  F/U CHF TECHNOLOGIST PROVIDED HISTORY: F/U CHF FINDINGS: Right-sided central venous catheter tip overlies the superior cavoatrial junction. The heart is similar in size to the prior study. There are bilateral pleural effusions and interstitial/airspace opacities. No pneumothorax is seen. Findings are compatible with pulmonary edema. Bilateral pleural effusions. XR CHEST PORTABLE    Result Date: 7/3/2022  EXAMINATION: ONE XRAY VIEW OF THE CHEST 7/3/2022 8:03 am COMPARISON: 06/30/2022. HISTORY: ORDERING SYSTEM PROVIDED HISTORY: ?fluid overload TECHNOLOGIST PROVIDED HISTORY: ?fluid overload Reason for Exam: port supine at 715am FINDINGS: The cardiomediastinal silhouette is stable status post median sternotomy. There is increased perihilar edema with persistent bilateral pleural effusions and bibasilar volume loss. No pneumothorax. Endotracheal tube tip approximately 3 cm above the kumar. Enteric catheter and right IJ catheter are in place. Mild increased perihilar edema, mild increased perihilar opacification, likely edema and CHF. Persistent small effusions and bibasilar atelectasis. Satisfactory position of endotracheal tube. XR CHEST PORTABLE    Result Date: 6/30/2022  EXAMINATION: ONE XRAY VIEW OF THE CHEST 6/30/2022 2:31 pm COMPARISON: June 29, 2022 chest examination HISTORY: ORDERING SYSTEM PROVIDED HISTORY: respiratory failure intubated TECHNOLOGIST PROVIDED HISTORY: respiratory failure intubated FINDINGS: Median sternotomy.   Stable satisfactorily positioned endotracheal/nasogastric tubes and right IJ catheter Limited rotated exam Stable cardiac silhouette/mild tortuosity of the thoracic aorta Interval increase in now moderate diffuse bilateral interstitial infiltrates with persistent moderate bilateral pleural effusions     Stable support lines Slight progression in now moderate diffuse bilateral interstitial infiltrates related to increasing edema and/or infection with moderate bilateral pleural effusions     XR CHEST PORTABLE    Result Date: 6/29/2022  EXAMINATION: ONE XRAY VIEW OF THE CHEST 6/29/2022 7:19 am COMPARISON: Chest radiograph performed 06/28/2022. HISTORY: ORDERING SYSTEM PROVIDED HISTORY: Respiratory failure needing intubation TECHNOLOGIST PROVIDED HISTORY: Respiratory failure needing intubation FINDINGS: There are bilateral effusions. There are scattered infiltrates. There is no pneumothorax. The mediastinal structures are unremarkable. The upper abdomen unremarkable. The extrathoracic soft tissues are unremarkable. There is endotracheal tube with tip in the midtrachea. There is a gastric tube and the tip is not well visualized. There is a right internal jugular line. Scattered infiltrates with bilateral effusions. Support tubes as described above. XR CHEST PORTABLE    Result Date: 6/28/2022  EXAMINATION: ONE XRAY VIEW OF THE CHEST 6/28/2022 4:07 pm COMPARISON: Chest x-ray dated 28 June 2022, 0906 hours. HISTORY: ORDERING SYSTEM PROVIDED HISTORY: intubation TECHNOLOGIST PROVIDED HISTORY: intubation FINDINGS: There is an endotracheal tube with the tip just entering the right mainstem bronchus. There is an enteric tube with the tip and side port below the diaphragm with the tip below the field of view. There is right lower lobe airspace consolidation. Small bilateral pleural effusions. No pneumothorax. Stable cardiomediastinal silhouette     1. Endotracheal tube with the tip just entering the right mainstem bronchus. Recommend retraction by 4 cm. 2.  Right lower lobe airspace consolidation and small bilateral pleural effusions.  These findings were discussed with the inpatient nurse Dino Whitlock at [phone] hours on 28 June 2022     XR CHEST PORTABLE    Result Date: 6/28/2022  EXAMINATION: ONE XRAY VIEW OF THE CHEST 6/28/2022 9:26 am COMPARISON: June 27, 2022 chest exam HISTORY: [address] PROVIDED HISTORY: pulm congestion TECHNOLOGIST PROVIDED HISTORY: pulm congestion Reason for Exam: pulmanary congestion port upr at 905am FINDINGS: Median sternotomy/stable cardiomegaly Slight improvement in still moderate diffuse bilateral interstitial infiltrates with interval decrease in still moderate right and small to moderate left pleural effusions     Slight improvement in still moderate diffuse bilateral interstitial infiltrates related to improving pulmonary edema with decreasing still moderate right and mild to moderate left pleural effusions     XR CHEST PORTABLE    Result Date: 6/27/2022  EXAMINATION: ONE XRAY VIEW OF THE CHEST 6/27/2022 9:57 am COMPARISON: 06/12/2022, 06/07/2022, 05/18/2022 HISTORY: ORDERING SYSTEM PROVIDED HISTORY: Bradycardia TECHNOLOGIST PROVIDED HISTORY: Bradycardia FINDINGS: There are mixed interstitial and alveolar opacities throughout both lungs with bilateral pleural effusions. Cardiomegaly with prior CABG. Diffuse osseous demineralization which limits radiographic assessment of the bones. Mixed interstitial and alveolar opacities throughout both lungs, increased from 3 weeks prior. Correlate for edema or contributory infection. Bilateral small left greater than right pleural effusions which are also increased from 3 weeks prior. Unchanged cardiomegaly. IR TUNNELED CVC PLACE WO SQ PORT/PUMP > 5 YEARS    Result Date: 7/8/2022  PROCEDURE: FLUOROSCOPY GUIDED CONVERSION OF A TEMPORARY DIALYSIS CATHETER TO TUNNELED DIALYSIS CATHETER. 7/8/2022. HISTORY: ORDERING SYSTEM PROVIDED HISTORY: hemodialysis access TECHNOLOGIST PROVIDED HISTORY: hemodialysis access How many lumens are being requested?->2 What side should this line be placed? ->Either What site is the preferred site? ->Internal Jugular ACUTE KIDNEY INJURY SEDATION: 50 mcg fentanyl were titrated intravenously for pain control monitored under my direction. Total intraservice time of sedation was 15 minutes.   The patient's vital signs were monitored throughout the procedure and recorded in the patient's medical record by the nurse. FLUOROSCOPY DOSE AND TYPE OR TIME AND EXPOSURES: Fluoro time 0.2 minutes DAP 51 cGy cm 2 TECHNIQUE: This procedure was performed by Maxim Lopez PA-C under direct supervision of Dr. Tyler Chawla. Informed consent was obtained after a detailed explanation of the procedure including risks, benefits, and alternatives. Universal protocol was observed. The right neck and chest were prepped and draped in standard sterile fashion using maximum sterile barrier technique. A 035 guidewire was inserted through the right internal jugular vein temporary dialysis catheter and under fluoroscopic guidance was removed over the wire. The tract was serially dilated to allow the peel away vascular sheath. After localizing the right upper chest with 1% lidocaine a 2nd incision was made. A 14.5 Polish by 19 cm tip to cuff dual-lumen tunneled hemodialysis catheter was inserted through the tract and out of the venotomy site of the previous temporary dialysis catheter. The dialysis catheter was inserted through the sheath with tip terminating in the right atrium. Both ports flushed and aspirated appropriately and filled with heparinized saline. The catheter was sutured to the skin and dressed appropriately. Estimated blood loss was 5 mL. The patient tolerated the procedure well and left the department stable condition. FINDINGS: Fluoroscopic image demonstrates the tip of the catheter in the right atrium. Successful ultrasound and fluoroscopy guided tunneled catheter placement . Okay to use. XR ABDOMEN FOR NG/OG/NE TUBE PLACEMENT    Result Date: 6/28/2022  EXAMINATION: ONE SUPINE XRAY VIEW(S) OF THE ABDOMEN 6/28/2022 4:07 pm COMPARISON: None. HISTORY: ORDERING SYSTEM PROVIDED HISTORY: og tube placement TECHNOLOGIST PROVIDED HISTORY: og tube placement Portable? ->Yes FINDINGS: There are moderate bibasilar opacities there is a nasogastric tube present with tube side port projected at the junction of the gastric fundus/proximal gastric body. The NG tube tip is projected at the mid gastric body     NG tube position, as detailed     IR NONTUNNELED VASCULAR CATHETER > 5 YEARS    Result Date: 6/30/2022  PROCEDURE: ULTRASOUND GUIDED VASCULAR ACCESS. FLUOROSCOPY GUIDED PLACEMENT OF A NON-TUNNELED CATHETER. 6/28/2022. HISTORY: ORDERING SYSTEM PROVIDED HISTORY: temp cath TECHNOLOGIST PROVIDED HISTORY: temp cath Acute kidney injury SEDATION: None FLUOROSCOPY DOSE AND TYPE OR TIME AND EXPOSURES: Fluoro time 2.00 minutes  cGy cm 2 TECHNIQUE: This procedure was performed by Brad Douglas PA-C under indirect supervision of Dr. Haris Golden. Informed consent was obtained after a detailed explanation of the procedure including risks, benefits, and alternatives. Universal protocol was observed using maximum sterile barrier technique. Using ultrasound guidance, after anesthetizing the skin with one percent lidocaine, a micropuncture needle was advanced into the patent right internal jugular vein. An ultrasound image demonstrating patency of the vein with needle tip located within it was obtained and stored in PACs. A microwire was inserted under fluoroscopic guidance and the needle was removed and a 5 [place] sheath was placed. The microwire was exchanged for an 035 wire and the tract was serially dilated to accommodate a 13.5 [place] by 15 cm non tunneled duo split hemodialysis catheter. An image was saved demonstrating the catheter tip in the right atrium. The catheter flushed and aspirated appropriately. This was sutured to the skin using 2-0 Ethilon and dressed appropriately. Estimated blood loss was 5 mL. The patient tolerated the procedure well and left the Department stable condition. FINDINGS: Fluoroscopic image demonstrates the tip of the catheter in the right atrium.      Successful ultrasound and fluoroscopy guided 13.5 Western Mary by 15 cm non tunneled hemodialysis catheter placement. Ready for use. FL MODIFIED BARIUM SWALLOW W VIDEO    Result Date: 7/10/2022  EXAMINATION: MODIFIED BARIUM SWALLOW WAS PERFORMED IN CONJUNCTION WITH SPEECH PATHOLOGY SERVICES TECHNIQUE: Fluoroscopic evaluation of the swallowing mechanism was performed using cineradiography with multiple consistency of barium product in conjunction with speech pathology services. FLUOROSCOPY DOSE AND TYPE OR TIME AND EXPOSURES: Fluoro time: 2 minutes Dose: 13.644 mGy COMPARISON: None HISTORY: ORDERING SYSTEM PROVIDED HISTORY: concern for aspiration TECHNOLOGIST PROVIDED HISTORY: concern for aspiration 68-year-old female with possible aspiration FINDINGS: With the thin liquid substance by straw, there was trace flash penetration without aspiration. With the thick liquid substance by teaspoon and straw, pureed/pudding thick, soft solid and cookie solid substances, there was no penetration or aspiration. 1. Trace flash penetration without aspiration with the thin liquid substance by straw. 2. No penetration or aspiration with the remainder of the administered substances. Please see separate speech pathology report for full discussion of findings and recommendations. ASSESSMENT  and  PLAN     Principal Problem:    Gastrointestinal hemorrhage  Active Problems:    Dependence on renal dialysis ([facility]    CKD stage 4 secondary to hypertension (HCC)    Paroxysmal atrial fibrillation (HCC)  Resolved Problems:    * No resolved hospital problems. *    Plan:    Rectal Bleeding with dark colored stools   -patient reports bloody liquid stools prior to arrival  -rectal guaiac positive in ED  -hemoglobin 4.9  -2 units PRBC ordered in ED  -IV protonix bolus with continuous infusion   -Hold, eliquis, ASA and VTE d/t bleeding  -GI consulted in ED  -NPO  -Pain and Nausea control    Atrial fibrillation with RVR  -EKG in ED - a.  Fib   -monitor telemetry and VS  -hold anticoagulation due to GI bleed    ESRD  -on dialysis 3x/week  -consult nephrology for treatment in am       Consultations:     Yandel Velarde TO EVE  IP CONSULT TO NEPHROLOGY  IP CONSULT TO [address] [facility], MAL - NP   7/20/2022  4:17 AM    [facility]: RAFI Silva [address] [place] [address].    Phone [phone]

## 2022-07-20 NOTE — ED PROVIDER NOTES
[address]    Pt Name: Shantel Huertas  MRN: [id number]  [place] 1950  Date of evaluation: 7/19/22  CHIEF COMPLAINT       Chief Complaint   Patient presents with    Melena     HISTORY OF PRESENT ILLNESS   69-year-old female presents for reported dark stools concern for GI bleed. Per nursing home staff when they change patient alert tonight noted that she had a very dark stool concern for bleeding, and she was sent for evaluation. Patient currently denying any complaints, denies any dizziness or lightheadedness, does have reported history of anemia and is reportedly on Eliquis    The history is provided by the patient, the EMS personnel and the nursing home. REVIEW OF SYSTEMS     Review of Systems   Constitutional:  Negative for fever. HENT:  Negative for congestion and ear pain. Eyes:  Negative for pain. Respiratory:  Negative for shortness of breath. Cardiovascular:  Negative for chest pain, palpitations and leg swelling. Gastrointestinal:  Positive for blood in stool. Negative for abdominal pain. Genitourinary:  Negative for dysuria and flank pain. Musculoskeletal:  Negative for back pain. Skin:  Negative for color change. Neurological:  Negative for numbness and headaches. Psychiatric/Behavioral:  Negative for confusion. All other systems reviewed and are negative.   PASTMEDICAL HISTORY     Past Medical History:   Diagnosis Date    Acute CVA (cerebrovascular accident) ([address] 07/25/2020    Acute on chronic combined systolic (congestive) and diastolic (congestive) heart failure ([address] 02/06/2022    Arthritis     Asthma     Bradycardia 06/12/2022    ESRD (end stage renal disease) ([address]     Essential hypertension 07/25/2020    Hallucinations 02/18/2021    Hard of hearing     Head contusion 09/09/2020    Iron deficiency anemia, unspecified     Meningioma ([address] 02/25/2021    Myocardial infarction ([address]     Pure hypercholesterolemia     Type 2 diabetes N17.9, N18.9    Bradycardia R00.1    Obesity (BMI 30-39. 9) E66.9    TSH elevation R79.89    Chronic ulcer of left leg with fat layer exposed ([address] L97.922    Hyperkalemia E87.5    Shock ([address] R57.9    Acute respiratory failure with hypoxia and hypercapnia (HCC) J96.01, J96.02    Pulmonary edema cardiac cause (HCC) I50.1    Dependence on renal dialysis (HCC) Z99.2    Nephrotic range proteinuria R80.9    External hemorrhoid K64.4    Dysphagia R13.10    Debility R53.81    Sepsis ([facility] A41.9    Arterial hypotension I95.9    Gastrointestinal hemorrhage K92.2     SURGICAL HISTORY       Past Surgical History:   Procedure Laterality Date    COLONOSCOPY N/A 3/15/2022    COLONOSCOPY DIAGNOSTIC performed by Chuckie Pollard MD at [facility]      x 3, Dr. Lulú Wayne  3/7/2022         IR NONTUNNELED VASCULAR CATHETER  6/28/2022    IR NONTUNNELED VASCULAR CATHETER 6/28/2022 Claudette Chad Redfox, PA [facility]    IR TUNNELED CATHETER PLACEMENT GREATER THAN 5 YEARS  7/8/2022    IR TUNNELED CATHETER PLACEMENT GREATER THAN 5 YEARS 7/8/2022 [facility]    THORACENTESIS  3/10/2022         TONSILLECTOMY AND ADENOIDECTOMY      UPPER GASTROINTESTINAL ENDOSCOPY N/A 3/15/2022    EGD BIOPSY performed by Chuckie Pollard MD at [address] [place] [address]       Previous Medications    ALBUTEROL SULFATE HFA (PROAIR HFA) 108 (90 BASE) MCG/ACT INHALER    Inhale 2 puffs into the lungs every 6 hours as needed for Wheezing or Shortness of Breath (cough)    AMIODARONE (PACERONE) 100 MG TABLET    Take 1 tablet by mouth daily    APIXABAN (ELIQUIS) 5 MG TABS TABLET    Take 1 tablet by mouth 2 times daily    ASCORBIC ACID (VITAMIN C) 500 MG TABLET    Take 1 tablet by mouth in the morning and 1 tablet before bedtime.     ASPIRIN EC 81 MG EC TABLET    Take 1 tablet by mouth daily    ATORVASTATIN (LIPITOR) 40 MG TABLET    Take 1 tablet by mouth once daily    BLOOD GLUCOSE MONITOR KIT AND SUPPLIES    1 kit by Other route three times daily Dispense Butterfly Elite CBG Device    BLOOD GLUCOSE TEST STRIPS (FREESTYLE LITE) STRIP    1 each by In Vitro route daily As needed. BLOOD PRESSURE KIT    1 kit by Does not apply route three times daily    BLOOD PRESSURE KIT    Diagnosis: HTN. Needs to check blood pressure 1-2 times a day until stable, then once a day. Goal blood pressure less than 135/85, and above 110/60. DESLORATADINE (CLARINEX) 5 MG TABLET    Take 1 tablet by mouth once daily    EPOETIN OSIEL-EPBX (RETACRIT) 30141 UNIT/ML SOLN INJECTION    Inject 0.5 mLs into the skin three times a week    FERROUS SULFATE (IRON 325) 325 (65 FE) MG TABLET    Take 1 tablet by mouth in the morning and at bedtime    FLUTICASONE-SALMETEROL (ADVAIR HFA) 115-21 MCG/ACT INHALER    Inhale 2 puffs into the lungs 2 times daily Maintenance inhaler    FREESTYLE LANCETS MISC    1 each by Does not apply route daily Patient needs to contact office before any further refills will be approved    FUROSEMIDE (LASIX) 80 MG TABLET    Take 1 tablet by mouth in the morning. HANDICAP PLACARD MISC    by Does not apply route Expires on 12/30/25    HYDROCORTISONE (ANUSOL-HC) 25 MG SUPPOSITORY    Place 1 suppository rectally 2 times daily    LEVOTHYROXINE (SYNTHROID) 25 MCG TABLET    Take 1 tablet by mouth Daily    MAGNESIUM OXIDE (MAG-OX) 400 (241.3 MG) MG TABS TABLET    Take 1 tablet by mouth twice daily    METOPROLOL TARTRATE (LOPRESSOR) 25 MG TABLET    Take 1 tablet by mouth 2 times daily    MICONAZOLE (MICOTIN) 2 % POWDER    Apply topically 2 times daily UNDER THE SKIN FOLDS LONG TERM    MISC. DEVICES (ADJUST FOLD CANE/YORK HANDLE) MISC    Use daily for walking    MULTIPLE VITAMINS-MINERALS (THERAPEUTIC MULTIVITAMIN-MINERALS) TABLET    Take 1 tablet by mouth daily    NEBULIZERS (COMPRESSOR/NEBULIZER) MISC    Nebulizer with compressor. Disposable Med Nebs 2 /month. Reusable Med Nebs 1 per 6 months. Aerosol Mask 1 per month.  Replacement Filters 2 per month. All other related supplies as needed per month. Medications being used: Albuterol . 083 unit dose vial. Frequency: every 6 hrs x 4/day . Length of Need: 1    PANTOPRAZOLE (PROTONIX) 40 MG TABLET    Take 1 tablet by mouth every morning (before breakfast)    POLYETHYLENE GLYCOL (GLYCOLAX) 17 G PACKET    Take 17 g by mouth 2 times daily    QUETIAPINE (SEROQUEL) 25 MG TABLET    Take 1 tablet by mouth nightly    VITAMIN D (ERGOCALCIFEROL) 1.25 MG (87515 UT) CAPS CAPSULE    Take 1 capsule by mouth once a week Sundays     ALLERGIES     is allergic to latex, aleve [naproxen sodium], pioglitazone, claritin [loratadine], keflex [cephalexin], and lisinopril. FAMILY HISTORY     She indicated that the status of her mother is unknown. She indicated that the status of her father is unknown. She indicated that the status of her sister is unknown. She indicated that the status of her maternal grandmother is unknown. SOCIAL HISTORY       Social History     Tobacco Use    Smoking status: Former     Packs/day: 0.25     Years: 10.00     Pack years: 2.50     Types: Cigarettes     Quit date: 2000     Years since quittin.5    Smokeless tobacco: Never   Vaping Use    Vaping Use: Never used   Substance Use Topics    Alcohol use: Not Currently     Alcohol/week: 0.0 standard drinks    Drug use: No     PHYSICAL EXAM     INITIAL VITALS: BP (!) 109/36   Pulse 96   Temp 97.6 °F (36.4 °C)   Resp 25   Wt 133 lb (60.3 kg)   SpO2 95%   BMI 26.86 kg/m²    Physical Exam  Vitals and nursing note reviewed. Exam conducted with a chaperone present. Constitutional:       General: She is not in acute distress. Appearance: Normal appearance. She is not toxic-appearing. HENT:      Head: Normocephalic and atraumatic. Nose: Nose normal.      Mouth/Throat:      Mouth: Mucous membranes are moist.      Pharynx: Oropharynx is clear. Eyes:      Extraocular Movements: Extraocular movements intact.       Conjunctiva/sclera: Conjunctivae normal.      Pupils: Pupils are equal, round, and reactive to light. Cardiovascular:      Rate and Rhythm: Normal rate and regular rhythm. Pulses: Normal pulses. Heart sounds: Normal heart sounds. Pulmonary:      Effort: Pulmonary effort is normal.      Breath sounds: Normal breath sounds. Abdominal:      General: Bowel sounds are normal. There is no distension. Palpations: Abdomen is soft. Tenderness: There is no abdominal tenderness. Genitourinary:     Rectum: Guaiac result positive. No tenderness. Musculoskeletal:         General: Normal range of motion. Cervical back: Normal range of motion. No spinous process tenderness or muscular tenderness. Skin:     General: Skin is warm and dry. Capillary Refill: Capillary refill takes less than 2 seconds. Neurological:      General: No focal deficit present. Mental Status: She is alert and oriented to person, place, and time. Cranial Nerves: Cranial nerves are intact. Sensory: Sensation is intact. Motor: Motor function is intact. Psychiatric:         Mood and Affect: Mood normal.         Thought Content: Thought content does not include homicidal or suicidal ideation. MEDICAL DECISION MAKIN-year-old female presents for reported dark stools concern for GI bleed.   On initial exam patient in no acute distress vitals are stable, rectal exam was performed showing dark stool, for GI bleed, will check labs    Labs reviewed patient found to have a hemoglobin of 4.9, creatinine stable, patient was Hemoccult positive    Given that patient with melena concerning for upper GI bleed, started on Protonix drip, will transfuse 2 units given the active bleeding and hemoglobin of 4.9      Will admit for GI evaluation and further hemoglobin monitoring    Results were discussed with patient and family, discussed need for admission and they are agreeable    Spoke with Sim Valles NP for Dr. Donna Borjas who accepts admission with GI consult. Patient demonstrates understanding and agreement with the plan, was given the opportunity to ask questions, and these questions were answered to the best of the provided information at this time. VS stable for transfer. This dictation was prepared using [employer] voice recognition software. CRITICAL CARE: 33 min      PROCEDURES:    Procedures    DIAGNOSTIC RESULTS   EKG:All EKG's are interpreted by the Emergency Department Physician who either signs or Co-signs this chart in the absence of a cardiologist.    A. fib rate of 95, normal axis, no ST segment elevation or depression, nonspecific T wave changes    RADIOLOGY:All plain film, CT, MRI, and formal ultrasound images (except ED bedside ultrasound) are read by the radiologist, see reports below, unless otherwisenoted in MDM or here. XR CHEST PORTABLE   Final Result   Pulmonary edema has improved. Persistent moderate-sized right pleural effusion with basilar consolidation. LABS: All lab results were reviewed by myself, and all abnormals are listed below.   Labs Reviewed   CBC WITH AUTO DIFFERENTIAL - Abnormal; Notable for the following components:       Result Value    RBC 1.59 (*)     Hemoglobin 4.9 (*)     Hematocrit 15.1 (*)     RDW 18.5 (*)     Seg Neutrophils 79 (*)     Lymphocytes 11 (*)     nRBC 2 (*)     Absolute Lymph # 0.76 (*)     All other components within normal limits   COMPREHENSIVE METABOLIC PANEL - Abnormal; Notable for the following components:    Glucose 126 (*)     BUN 89 (*)     CREATININE 2.86 (*)     Calcium 8.0 (*)     Total Protein 4.2 (*)     Albumin 1.9 (*)     GFR Non- 16 (*)     GFR  20 (*)     All other components within normal limits   PROTIME-INR - Abnormal; Notable for the following components:    Protime 21.0 (*)     All other components within normal limits   OCCULT BLOOD SCREEN - Abnormal; Notable for the following components: Occult Blood, Stool #1 POSITIVE (*)     All other components within normal limits   TROPONIN - Abnormal; Notable for the following components:    Troponin, High Sensitivity 67 (*)     All other components within normal limits   TROPONIN - Abnormal; Notable for the following components:    Troponin, High Sensitivity 65 (*)     All other components within normal limits   LIPASE   MAGNESIUM   APTT   TYPE AND SCREEN   PREPARE RBC (CROSSMATCH)       EMERGENCY DEPARTMENTCOURSE:         Vitals:    Vitals:    07/20/22 0043 07/20/22 0200 07/20/22 0224 07/20/22 0230   BP: (!) 113/42  108/66 (!) 109/36   Pulse: 97 95 98 96   Resp: 17 25 24 25   Temp:       SpO2: 95% 95% 95% 95%   Weight:           The patient was given the following medications while in the emergency department:  Orders Placed This Encounter   Medications    pantoprazole (PROTONIX) 80 mg in sodium chloride 0.9 % 50 mL bolus    pantoprazole (PROTONIX) 80 mg in sodium chloride 0.9 % 100 mL infusion    0.9 % sodium chloride infusion    pantoprazole (PROTONIX) 40 mg in sodium chloride (PF) 10 mL injection    0.9 % sodium chloride bolus     CONSULTS:  IP CONSULT TO INTERNAL MEDICINE  IP CONSULT TO GI    FINAL IMPRESSION      1. Gastrointestinal hemorrhage, unspecified gastrointestinal hemorrhage type    2. Anemia, unspecified type          DISPOSITION/PLAN   DISPOSITION Admitted 07/20/2022 02:06:43 AM      PATIENT REFERRED TO:  No follow-up provider specified. DISCHARGE MEDICATIONS:  New Prescriptions    No medications on file     The care is provided during an unprecedented national emergency due to the novel coronavirus, COVID 19.   Ole Fraction, DO                     Ole Fraction, DO  07/20/22 1737

## 2022-07-20 NOTE — H&P
[address] [facility]     HISTORY AND PHYSICAL EXAMINATION            Date:   7/20/2022  Patientname:  Jessica Ojeda  Date of admission:  7/19/2022 11:42 PM  MRN:   [id number]  Account:  [de-identified]  YOB: 1950  PCP:    Flaquita Loza MD  Room:   2013/2013-01  Code Status:    Full Code    CHIEF COMPLAINT     Chief Complaint   Patient presents with    Melena       HISTORY OF PRESENT ILLNESS  (Character, Onset, Location, Duration,  Exacerbating/RelievingFactors, Radiation,   Associated Symptoms, Severity )      The patient is a 66 y.o.  female, with a history of COPD, ESRD on dialysis MWF and A-fib. Patient has an extensive medical history and was transported to the ED from [address] [place] [address] [address] after being in the SNF for only 1 day. She was admitted to [address] [place] [address] 6/27-7/18 admitted for encephalopathy, hypoxia requiring mechanical ventilation and bradycardia requiring atropine and transcutaneous pacing. She sustained JOY on top of Stage 4 CKD resulting in hyperkalemia and she is now dialysis dependent. During hospitalization she had multiple episodes of recurrent anemia requiring transfusion of PRBC. Despite recurrent anemia she was discharged to SNF on eliquis. She presented to the ED due to acute rectal bleeding. Daughter is at bedside and reports that when she went to the SNF to see her mother she noticed that the patient was lethargic and more confused. Patient has had dark stools from taking iron but when her brief was changed the aide reported a large amount of bright red blood mixed with stool. Her symptoms are acute and severe with a hemoglobin of 4.9. 2 units of PRBC were ordered in the ED and GI consulted. Symptoms continued to be associated with lethargy and mild confusion. Patient is able to state that she is in the hospital and responds to most questions appropriately in regards to her recent medical history.  She does have a chronic dry cough from COPD. Daughter also reports patient having a decreased appetite and feels that patient has lost weight but is uncertain of amount. There are no reported aggravating or alleviating factors. Denies fever, chills, chest pain, abdominal pain, nausea, vomiting, diarrhea, and urinary symptoms. When confirming code status as Full code, her daughter stated that family has been waiting for patient's mentation to improve prior to further discussing her code status and goals of care. Patient has 4 adult children. PAST MEDICAL HISTORY   Patient  has a past medical history of Acute CVA (cerebrovascular accident) ([address] Acute on chronic combined systolic (congestive) and diastolic (congestive) heart failure ([address] Arthritis, Asthma, Bradycardia, ESRD (end stage renal disease) ([address] Essential hypertension, Hallucinations, Hard of hearing, Head contusion, Iron deficiency anemia, unspecified, Meningioma ([address] Myocardial infarction ([address] Pure hypercholesterolemia, Type 2 diabetes mellitus with stage 4 chronic kidney disease, without long-term current use of insulin ([address] Unspecified venous (peripheral) insufficiency, and Unspecified vitamin D deficiency. PAST SURGICAL HISTORY    Patient  has a past surgical history that includes Tonsillectomy and adenoidectomy; Coronary artery bypass graft; intubation (3/7/2022); Thoracentesis (3/10/2022); Upper gastrointestinal endoscopy (N/A, 3/15/2022); Colonoscopy (N/A, 3/15/2022); IR NONTUNNELED VASCULAR CATHETER > 5 YEARS (6/28/2022); and IR TUNNELED CVC PLACE WO SQ PORT/PUMP > 5 YEARS (7/8/2022). FAMILY HISTORY    Patient family history includes Diabetes in her maternal grandmother, mother, and sister; Heart Disease in her father and mother; High Blood Pressure in her sister. SOCIAL HISTORY    Patient  reports that she quit smoking about 22 years ago. She has a 2.50 pack-year smoking history.  She has never used smokeless tobacco. She reports that she does not currently use alcohol. She reports that she does not use drugs. HOME MEDICATIONS        Prior to Admission medications    Medication Sig Start Date End Date Taking? Authorizing Provider   QUEtiapine (SEROQUEL) 25 MG tablet Take 1 tablet by mouth nightly 7/18/22  Yes Nikolay GODOY Blood, DO   furosemide (LASIX) 80 MG tablet Take 1 tablet by mouth in the morning. 7/18/22  Yes Nikolay GODOY Blood, DO   ferrous sulfate (IRON 325) 325 (65 Fe) MG tablet Take 1 tablet by mouth in the morning and at bedtime 7/18/22  Yes Nikolay GODOY Blood, DO   ascorbic acid (VITAMIN C) 500 MG tablet Take 1 tablet by mouth in the morning and 1 tablet before bedtime.  7/18/22  Yes Nikolay GODOY Blood, DO   pantoprazole (PROTONIX) 40 MG tablet Take 1 tablet by mouth every morning (before breakfast) 7/19/22  Yes Nikolay GODOY Blood, DO   epoetin rick-epbx (RETACRIT) 81104 UNIT/ML SOLN injection Inject 0.5 mLs into the skin three times a week 7/18/22  Yes Tod GODOY Blood, DO   amiodarone (PACERONE) 100 MG tablet Take 1 tablet by mouth daily 7/12/22  Yes Roberto De Souza MD   apixaban (ELIQUIS) 5 MG TABS tablet Take 1 tablet by mouth 2 times daily 7/11/22  Yes Roberto De Souza MD   hydrocortisone (ANUSOL-HC) 25 MG suppository Place 1 suppository rectally 2 times daily 7/11/22  Yes Roberto De Souza MD   polyethylene glycol (GLYCOLAX) 17 g packet Take 17 g by mouth 2 times daily 7/11/22 8/10/22 Yes Roberto De Souza MD   levothyroxine (SYNTHROID) 25 MCG tablet Take 1 tablet by mouth Daily 7/12/22  Yes Roberto De Souza MD   metoprolol tartrate (LOPRESSOR) 25 MG tablet Take 1 tablet by mouth 2 times daily 6/17/22  Yes Justina Slater MD   aspirin EC 81 MG EC tablet Take 1 tablet by mouth daily 6/17/22  Yes Justina Slater MD   albuterol sulfate HFA (PROAIR HFA) 108 (90 Base) MCG/ACT inhaler Inhale 2 puffs into the lungs every 6 hours as needed for Wheezing or Shortness of Breath (cough) 6/17/22  Yes Justina Slater MD   fluticasone-salmeterol (ADVAIR HFA) 413-99 MCG/ACT inhaler Inhale 2 puffs into the lungs 2 times daily Maintenance inhaler 6/17/22  Yes Sandi Diamond MD   vitamin D (ERGOCALCIFEROL) 1.25 MG (53850 UT) CAPS capsule Take 1 capsule by mouth once a week Sundays 4/28/22  Yes Sandi Diamond MD   atorvastatin (LIPITOR) 40 MG tablet Take 1 tablet by mouth once daily 2/15/22  Yes Sandi Diamond MD   desloratadine (CLARINEX) 5 MG tablet Take 1 tablet by mouth once daily 2/15/22  Yes Sandi Diamond MD   magnesium oxide (MAG-OX) 400 (241.3 Mg) MG TABS tablet Take 1 tablet by mouth twice daily 2/15/22  Yes Sandi Diamond MD   miconazole (MICOTIN) 2 % powder Apply topically 2 times daily UNDER THE SKIN FOLDS LONG TERM 2/15/22  Yes Sandi Diamond MD   Multiple Vitamins-Minerals (THERAPEUTIC MULTIVITAMIN-MINERALS) tablet Take 1 tablet by mouth daily 2/15/22  Yes Sandi Diamond MD   blood glucose test strips (FREESTYLE LITE) strip 1 each by In Vitro route daily As needed. 3/19/21  Yes Sandi Diamond MD   blood glucose monitor kit and supplies 1 kit by Other route three times daily Dispense Butterfly Elite CBG Device 8/20/19  Yes Edenilson Oneal MD   amLODIPine (NORVASC) 10 MG tablet Take 1 tablet by mouth nightly 6/11/22 7/18/22  Marychuy Dinero MD   Blood Pressure KIT Diagnosis: HTN. Needs to check blood pressure 1-2 times a day until stable, then once a day. Goal blood pressure less than 135/85, and above 110/60. 5/26/22   Sandi Diamond MD   Nebulizers (COMPRESSOR/NEBULIZER) MISC Nebulizer with compressor. Disposable Med Nebs 2 /month. Reusable Med Nebs 1 per 6 months. Aerosol Mask 1 per month. Replacement Filters 2 per month. All other related supplies as needed per month. Medications being used: Albuterol . 083 unit dose vial. Frequency: every 6 hrs x 4/day . Length of Need: 1  Patient not taking: No sig reported 2/22/22   Della Chapman MD   Misc.  Devices (ADJUST FOLD CANE/YORK HANDLE) MISC Use daily for walking 2/22/22 Vidya Ohara MD   Handicap Placard MISC by Does not apply route Expires on 12/30/25 2/22/22   Vidya Ohara MD   FreeStyle Lancets MISC 1 each by Does not apply route daily Patient needs to contact office before any further refills will be approved 2/15/22   Sonja Hi MD   Blood Pressure KIT 1 kit by Does not apply route three times daily  Patient not taking: No sig reported 12/19/19   Bandar Guerrier MD       ALLERGIES      Latex, Aleve [naproxen sodium], Pioglitazone, Claritin [loratadine], Keflex [cephalexin], and Lisinopril    REVIEW OF SYSTEMS     Review of Systems   Constitutional:  Positive for appetite change. Negative for chills, diaphoresis and fever. HENT:  Negative for congestion and sore throat. Respiratory:  Positive for cough. Negative for shortness of breath and wheezing. Dry non-productive cough; chronic   Cardiovascular:  Negative for chest pain, palpitations and leg swelling. Gastrointestinal:  Positive for blood in stool. Negative for abdominal pain, constipation, diarrhea, nausea and vomiting. Genitourinary:  Positive for decreased urine volume. Negative for dysuria, frequency and urgency. ESRD on dialysis    Musculoskeletal:  Negative for back pain and myalgias. Skin:  Positive for pallor. Negative for rash. Neurological:  Positive for weakness. Negative for dizziness and headaches. Generalized weakness    Psychiatric/Behavioral:  Positive for confusion. The patient is not nervous/anxious. Mild confusion, oriented to person and place   PHYSICAL EXAM      BP (!) 149/46   Pulse 98   Temp 98.2 °F (36.8 °C)   Resp 25   Wt 139 lb 1.8 oz (63.1 kg)   SpO2 95%   BMI 28.10 kg/m²  Body mass index is 28.1 kg/m². Physical Exam  Vitals and nursing note reviewed. Constitutional:       General: She is not in acute distress. Appearance: She is well-developed. She is not diaphoretic.       Comments: Lethargic, oriented to person/place HENT:      Head: Normocephalic and atraumatic. Comments: [place]  Eyes:      Conjunctiva/sclera: Conjunctivae normal.      Pupils: Pupils are equal, round, and reactive to light. Neck:      Trachea: No tracheal deviation. Cardiovascular:      Rate and Rhythm: Rhythm irregular. Heart sounds: Normal heart sounds. No murmur heard. No friction rub. No gallop. Pulmonary:      Effort: Pulmonary effort is normal. No respiratory distress. Breath sounds: Normal breath sounds. No wheezing or rales. Chest:      Chest wall: No tenderness. Abdominal:      General: Bowel sounds are normal. There is no distension. Palpations: Abdomen is soft. Tenderness: There is no abdominal tenderness. There is no guarding. Genitourinary:     Rectum: Guaiac result positive. Musculoskeletal:         General: No tenderness. Normal range of motion. Cervical back: Normal range of motion and neck supple. Lymphadenopathy:      Cervical: No cervical adenopathy. Skin:     General: Skin is warm and dry. Coloration: Skin is pale. Findings: No erythema or rash. Neurological:      Mental Status: She is alert. Motor: Weakness present. No seizure activity. Coordination: Coordination normal.      Comments: Generalized weakness, mild confusion   Psychiatric:         Behavior: Behavior normal.         Thought Content: Thought content normal.     DIAGNOSTICS     EKG Interpretation    Interpreted by emergency department physician    Collected: 07/20/22 0009    Updated: 07/20/22 0010     Ventricular Rate 95 BPM    Atrial Rate 76 BPM    QRS Duration 98 ms    Q-T Interval 376 ms    QTc Calculation (Bazett) 472 ms    R Axis 10 degrees    T Axis -137 degrees   Narrative:     Atrial fibrillation   Nonspecific ST and T wave abnormality   Prolonged QT   Abnormal ECG   When compared with ECG of 12-JUN-2022 17:57,   Atrial fibrillation has replaced Sinus rhythm   Vent.  rate has increased BY  54 BPM Non-specific change in ST segment in Anterior leads   Nonspecific T wave abnormality now evident in Lateral leads   QT has lengthened       Labs:  CBC:   Recent Labs     07/17/22  1646 07/18/22  0947 07/20/22  0030   WBC  --  10.3 6.9   HGB 7.2* 7.5* 4.9*   PLT  --  210 203       BMP:    Recent Labs     07/18/22  0947 07/20/22  0030    137   K 4.5 4.8    101   CO2 28 26   BUN 62* 89*   CREATININE 2.24* 2.86*   GLUCOSE 131* 126*       S. Calcium:  Recent Labs     07/20/22  0030   CALCIUM 8.0*       S. Ionized Calcium:No results for input(s): IONCA in the last 72 hours. S. Magnesium:  Recent Labs     07/20/22  0030   MG 1.9       S. Phosphorus:No results for input(s): PHOS in the last 72 hours. S. Glucose:  Recent Labs     07/18/22  0655 07/18/22  1100 07/18/22  1154   POCGLU 86 57* 100       Glycosylated hemoglobin A1C:   Lab Results   Component Value Date/Time    LABA1C 4.6 03/31/2022 11:21 AM     Hepatic:   Recent Labs     07/20/22  0030   AST 15   ALT 7   ALKPHOS 93       CARDIAC ENZY:   Recent Labs     07/20/22  0030 07/20/22  0235   TROPHS 67* 65*       INR:   Recent Labs     07/20/22  0030   INR 1.8       BNP: No results for input(s): PROBNP in the last 72 hours. ABGs: No results for input(s): PH, PCO2, PO2, HCO3, O2SAT in the last 72 hours. Lipids: No results for input(s): CHOL, TRIG, HDL, LDL, LDLCALC in the last 72 hours. Pancreatic functions:  Recent Labs     07/20/22  0030   LIPASE 18       S. LacticAcid: No results for input(s): LACTA in the last 72 hours. Thyroid functions:   Lab Results   Component Value Date/Time    TSH 9.54 06/27/2022 12:44 PM        U/A:No results for input(s): NITRITE, COLORU, WBCUA, RBCUA, MUCUS, BACTERIA, CLARITYU, SPECGRAV, LEUKOCYTESUR, BLOODU, GLUCOSEU, AMORPHOUS in the last 72 hours. Invalid input(s): Lorella Poll  No results for input(s): COVID19 in the last 72 hours.   Imaging/Diagonstics:     XR CHEST (SINGLE VIEW FRONTAL)    Result Date: 7/14/2022  EXAMINATION: ONE XRAY VIEW OF THE CHEST 7/14/2022 2:44 pm COMPARISON: Chest x-ray dated 10 July 2022 HISTORY: ORDERING SYSTEM PROVIDED HISTORY: screening TECHNOLOGIST PROVIDED HISTORY: screening FINDINGS: Stable right-sided dialysis catheter. Stable cardiomegaly with bilateral pleural effusions and bibasilar airspace opacities. Stable findings of CHF. CT HEAD WO CONTRAST    Result Date: 6/27/2022  EXAMINATION: CT OF THE HEAD WITHOUT CONTRAST  6/27/2022 10:20 am TECHNIQUE: CT of the head was performed without the administration of intravenous contrast. Automated exposure control, iterative reconstruction, and/or weight based adjustment of the mA/kV was utilized to reduce the radiation dose to as low as reasonably achievable. COMPARISON: Head CT 03/07/2022, 02/17/2021 HISTORY: ORDERING SYSTEM PROVIDED HISTORY: AMS TECHNOLOGIST PROVIDED HISTORY: AMS Decision Support Exception - unselect if not a suspected or confirmed emergency medical condition->Emergency Medical Condition (MA) Reason for Exam: ams FINDINGS: BRAIN/VENTRICLES: Examination is mildly motion degraded. There is no acute intracranial hemorrhage or midline shift. No abnormal extra-axial fluid collection with an unchanged 2.5 cm transverse by 2.6 cm AP extra-axial mass in the anterior right temporal region compatible with a meningioma with some peripheral rim mineralization. There is mass effect on the anterior right temporal lobe with suggestion of increased surrounding edema. Partial empty sella, unchanged from prior. Encephalomalacia from small remote insults in the right caudate and left basal ganglia region. The gray-white differentiation is otherwise maintained. Parenchymal atrophy which is greatest in frontal lobes. There is no evidence of hydrocephalus. ORBITS: The visualized portion of the orbits demonstrate no acute abnormality.  SINUSES: The visualized paranasal sinuses and mastoid air cells demonstrate no acute abnormality. SOFT TISSUES/SKULL:  No acute abnormality of the visualized skull or soft tissues. No acute intracranial hemorrhage or hydrocephalus. Background mild chronic small vessel white matter changes with remote lacunar insults in the right caudate and left basal ganglia, unchanged from prior. 2.6 cm anterior right temporal meningioma with suggested mild increase in the vasogenic edema along the anterior right temporal lobe relative to 4 months prior. This could be further assessed with a contrast-enhanced brain MRI. US RENAL COMPLETE    Result Date: 6/28/2022  EXAMINATION: RETROPERITONEAL ULTRASOUND OF THE KIDNEYS AND URINARY BLADDER 6/28/2022 COMPARISON: Ultrasound on 10/20/2020 HISTORY: Acute on chronic renal disease. FINDINGS: Kidneys: The right kidney measures 8.9 cm in length and the left kidney measures 11.6 cm in length. Kidneys demonstrate increased cortical echogenicity. No hydronephrosis. 1.4 cm right renal stone. Small renal cysts measure up to 1.3 cm. Bladder: Decompressed by a Stanford catheter. Chronic renal disease. Small renal cysts. 1.4 cm right renal stone. No hydronephrosis. XR CHEST PORTABLE    Result Date: 7/20/2022  EXAMINATION: ONE XRAY VIEW OF THE CHEST 7/19/2022 11:57 pm COMPARISON: Prior studies including 06/30/2022 and 07/14/2022 HISTORY: ORDERING SYSTEM PROVIDED HISTORY: pain TECHNOLOGIST PROVIDED HISTORY: pain Reason for Exam: CP and melena X several days FINDINGS: Pulmonary edema has improved from 07/14/2022. There is a persistent moderate right pleural effusion with right basilar consolidation. The heart is enlarged and unchanged. Prior sternal splitting procedure. A right IJ tunneled dialysis catheter remains in good position. Pulmonary edema has improved. Persistent moderate-sized right pleural effusion with basilar consolidation.      XR CHEST PORTABLE    Result Date: 7/11/2022  EXAMINATION: ONE X-RAY VIEW OF THE CHEST 7/10/2022 2:21 pm COMPARISON: 07/03/2022 HISTORY: ORDERING SYSTEM PROVIDED HISTORY:  F/U CHF TECHNOLOGIST PROVIDED HISTORY: F/U CHF FINDINGS: Right-sided central venous catheter tip overlies the superior cavoatrial junction. The heart is similar in size to the prior study. There are bilateral pleural effusions and interstitial/airspace opacities. No pneumothorax is seen. Findings are compatible with pulmonary edema. Bilateral pleural effusions. XR CHEST PORTABLE    Result Date: 7/3/2022  EXAMINATION: ONE XRAY VIEW OF THE CHEST 7/3/2022 8:03 am COMPARISON: 06/30/2022. HISTORY: ORDERING SYSTEM PROVIDED HISTORY: ?fluid overload TECHNOLOGIST PROVIDED HISTORY: ?fluid overload Reason for Exam: port supine at 715am FINDINGS: The cardiomediastinal silhouette is stable status post median sternotomy. There is increased perihilar edema with persistent bilateral pleural effusions and bibasilar volume loss. No pneumothorax. Endotracheal tube tip approximately 3 cm above the kumar. Enteric catheter and right IJ catheter are in place. Mild increased perihilar edema, mild increased perihilar opacification, likely edema and CHF. Persistent small effusions and bibasilar atelectasis. Satisfactory position of endotracheal tube. XR CHEST PORTABLE    Result Date: 6/30/2022  EXAMINATION: ONE XRAY VIEW OF THE CHEST 6/30/2022 2:31 pm COMPARISON: June 29, 2022 chest examination HISTORY: ORDERING SYSTEM PROVIDED HISTORY: respiratory failure intubated TECHNOLOGIST PROVIDED HISTORY: respiratory failure intubated FINDINGS: Median sternotomy.   Stable satisfactorily positioned endotracheal/nasogastric tubes and right IJ catheter Limited rotated exam Stable cardiac silhouette/mild tortuosity of the thoracic aorta Interval increase in now moderate diffuse bilateral interstitial infiltrates with persistent moderate bilateral pleural effusions     Stable support lines Slight progression in now moderate diffuse bilateral interstitial infiltrates related to increasing edema and/or infection with moderate bilateral pleural effusions     XR CHEST PORTABLE    Result Date: 6/29/2022  EXAMINATION: ONE XRAY VIEW OF THE CHEST 6/29/2022 7:19 am COMPARISON: Chest radiograph performed 06/28/2022. HISTORY: ORDERING SYSTEM PROVIDED HISTORY: Respiratory failure needing intubation TECHNOLOGIST PROVIDED HISTORY: Respiratory failure needing intubation FINDINGS: There are bilateral effusions. There are scattered infiltrates. There is no pneumothorax. The mediastinal structures are unremarkable. The upper abdomen unremarkable. The extrathoracic soft tissues are unremarkable. There is endotracheal tube with tip in the midtrachea. There is a gastric tube and the tip is not well visualized. There is a right internal jugular line. Scattered infiltrates with bilateral effusions. Support tubes as described above. XR CHEST PORTABLE    Result Date: 6/28/2022  EXAMINATION: ONE XRAY VIEW OF THE CHEST 6/28/2022 4:07 pm COMPARISON: Chest x-ray dated 28 June 2022, 0906 hours. HISTORY: ORDERING SYSTEM PROVIDED HISTORY: intubation TECHNOLOGIST PROVIDED HISTORY: intubation FINDINGS: There is an endotracheal tube with the tip just entering the right mainstem bronchus. There is an enteric tube with the tip and side port below the diaphragm with the tip below the field of view. There is right lower lobe airspace consolidation. Small bilateral pleural effusions. No pneumothorax. Stable cardiomediastinal silhouette     1. Endotracheal tube with the tip just entering the right mainstem bronchus. Recommend retraction by 4 cm. 2.  Right lower lobe airspace consolidation and small bilateral pleural effusions.  These findings were discussed with the inpatient nurse Ben Lui at [phone] hours on 28 June 2022     XR CHEST PORTABLE    Result Date: 6/28/2022  EXAMINATION: ONE XRAY VIEW OF THE CHEST 6/28/2022 9:26 am COMPARISON: June 27, 2022 chest exam HISTORY: ORDERING SYSTEM PROVIDED HISTORY: pulm congestion TECHNOLOGIST PROVIDED HISTORY: pulm congestion Reason for Exam: pulmanary congestion port upr at 905am FINDINGS: Median sternotomy/stable cardiomegaly Slight improvement in still moderate diffuse bilateral interstitial infiltrates with interval decrease in still moderate right and small to moderate left pleural effusions     Slight improvement in still moderate diffuse bilateral interstitial infiltrates related to improving pulmonary edema with decreasing still moderate right and mild to moderate left pleural effusions     XR CHEST PORTABLE    Result Date: 6/27/2022  EXAMINATION: ONE XRAY VIEW OF THE CHEST 6/27/2022 9:57 am COMPARISON: 06/12/2022, 06/07/2022, 05/18/2022 HISTORY: ORDERING SYSTEM PROVIDED HISTORY: Bradycardia TECHNOLOGIST PROVIDED HISTORY: Bradycardia FINDINGS: There are mixed interstitial and alveolar opacities throughout both lungs with bilateral pleural effusions. Cardiomegaly with prior CABG. Diffuse osseous demineralization which limits radiographic assessment of the bones. Mixed interstitial and alveolar opacities throughout both lungs, increased from 3 weeks prior. Correlate for edema or contributory infection. Bilateral small left greater than right pleural effusions which are also increased from 3 weeks prior. Unchanged cardiomegaly. IR TUNNELED CVC PLACE WO SQ PORT/PUMP > 5 YEARS    Result Date: 7/8/2022  PROCEDURE: FLUOROSCOPY GUIDED CONVERSION OF A TEMPORARY DIALYSIS CATHETER TO TUNNELED DIALYSIS CATHETER. 7/8/2022. HISTORY: ORDERING SYSTEM PROVIDED HISTORY: hemodialysis access TECHNOLOGIST PROVIDED HISTORY: hemodialysis access How many lumens are being requested?->2 What side should this line be placed? ->Either What site is the preferred site? ->Internal Jugular ACUTE KIDNEY INJURY SEDATION: 50 mcg fentanyl were titrated intravenously for pain control monitored under my direction. Total intraservice time of sedation was 15 minutes.   The patient's vital signs were monitored throughout the procedure and recorded in the patient's medical record by the nurse. FLUOROSCOPY DOSE AND TYPE OR TIME AND EXPOSURES: Fluoro time 0.2 minutes DAP 51 cGy cm 2 TECHNIQUE: This procedure was performed by Dane Pathak PA-C under direct supervision of Dr. Des Gill. Informed consent was obtained after a detailed explanation of the procedure including risks, benefits, and alternatives. Universal protocol was observed. The right neck and chest were prepped and draped in standard sterile fashion using maximum sterile barrier technique. A 035 guidewire was inserted through the right internal jugular vein temporary dialysis catheter and under fluoroscopic guidance was removed over the wire. The tract was serially dilated to allow the peel away vascular sheath. After localizing the right upper chest with 1% lidocaine a 2nd incision was made. A 14.5 Kuwaiti by 19 cm tip to cuff dual-lumen tunneled hemodialysis catheter was inserted through the tract and out of the venotomy site of the previous temporary dialysis catheter. The dialysis catheter was inserted through the sheath with tip terminating in the right atrium. Both ports flushed and aspirated appropriately and filled with heparinized saline. The catheter was sutured to the skin and dressed appropriately. Estimated blood loss was 5 mL. The patient tolerated the procedure well and left the department stable condition. FINDINGS: Fluoroscopic image demonstrates the tip of the catheter in the right atrium. Successful ultrasound and fluoroscopy guided tunneled catheter placement . Okay to use. XR ABDOMEN FOR NG/OG/NE TUBE PLACEMENT    Result Date: 6/28/2022  EXAMINATION: ONE SUPINE XRAY VIEW(S) OF THE ABDOMEN 6/28/2022 4:07 pm COMPARISON: None. HISTORY: ORDERING SYSTEM PROVIDED HISTORY: og tube placement TECHNOLOGIST PROVIDED HISTORY: og tube placement Portable? ->Yes FINDINGS: There are moderate bibasilar opacities there is a nasogastric tube present with tube side port projected at the junction of the gastric fundus/proximal gastric body. The NG tube tip is projected at the mid gastric body     NG tube position, as detailed     IR NONTUNNELED VASCULAR CATHETER > 5 YEARS    Result Date: 6/30/2022  PROCEDURE: ULTRASOUND GUIDED VASCULAR ACCESS. FLUOROSCOPY GUIDED PLACEMENT OF A NON-TUNNELED CATHETER. 6/28/2022. HISTORY: ORDERING SYSTEM PROVIDED HISTORY: temp cath TECHNOLOGIST PROVIDED HISTORY: temp cath Acute kidney injury SEDATION: None FLUOROSCOPY DOSE AND TYPE OR TIME AND EXPOSURES: Fluoro time 2.00 minutes  cGy cm 2 TECHNIQUE: This procedure was performed by Sophia Lomeli PA-C under indirect supervision of Dr. Tejal Burnett. Informed consent was obtained after a detailed explanation of the procedure including risks, benefits, and alternatives. Universal protocol was observed using maximum sterile barrier technique. Using ultrasound guidance, after anesthetizing the skin with one percent lidocaine, a micropuncture needle was advanced into the patent right internal jugular vein. An ultrasound image demonstrating patency of the vein with needle tip located within it was obtained and stored in PACs. A microwire was inserted under fluoroscopic guidance and the needle was removed and a 5 [place] Islander sheath was placed. The microwire was exchanged for an 035 wire and the tract was serially dilated to accommodate a 13.5 [place] by 15 cm non tunneled duo split hemodialysis catheter. An image was saved demonstrating the catheter tip in the right atrium. The catheter flushed and aspirated appropriately. This was sutured to the skin using 2-0 Ethilon and dressed appropriately. Estimated blood loss was 5 mL. The patient tolerated the procedure well and left the Department stable condition. FINDINGS: Fluoroscopic image demonstrates the tip of the catheter in the right atrium.      Successful ultrasound and fluoroscopy guided 13.5 [place] Islander by 15 cm non tunneled hemodialysis catheter placement. Ready for use. FL MODIFIED BARIUM SWALLOW W VIDEO    Result Date: 7/10/2022  EXAMINATION: MODIFIED BARIUM SWALLOW WAS PERFORMED IN CONJUNCTION WITH SPEECH PATHOLOGY SERVICES TECHNIQUE: Fluoroscopic evaluation of the swallowing mechanism was performed using cineradiography with multiple consistency of barium product in conjunction with speech pathology services. FLUOROSCOPY DOSE AND TYPE OR TIME AND EXPOSURES: Fluoro time: 2 minutes Dose: 13.644 mGy COMPARISON: None HISTORY: ORDERING SYSTEM PROVIDED HISTORY: concern for aspiration TECHNOLOGIST PROVIDED HISTORY: concern for aspiration 57-year-old female with possible aspiration FINDINGS: With the thin liquid substance by straw, there was trace flash penetration without aspiration. With the thick liquid substance by teaspoon and straw, pureed/pudding thick, soft solid and cookie solid substances, there was no penetration or aspiration. 1. Trace flash penetration without aspiration with the thin liquid substance by straw. 2. No penetration or aspiration with the remainder of the administered substances. Please see separate speech pathology report for full discussion of findings and recommendations. ASSESSMENT  and  PLAN     Principal Problem:    Gastrointestinal hemorrhage  Active Problems:    Dependence on renal dialysis ([facility]    CKD stage 4 secondary to hypertension (HCC)    Paroxysmal atrial fibrillation (HCC)  Resolved Problems:    * No resolved hospital problems. *    Plan:    Rectal Bleeding with dark colored stools   -patient reports bloody liquid stools prior to arrival  -rectal guaiac positive in ED  -hemoglobin 4.9  -2 units PRBC ordered in ED  -IV protonix bolus with continuous infusion   -Hold, eliquis, ASA and VTE d/t bleeding  -GI consulted in ED  -NPO  -Pain and Nausea control    Atrial fibrillation with RVR  -EKG in ED - a.  Fib   -monitor telemetry and VS  -hold anticoagulation due to GI bleed    ESRD  -on dialysis 3x/week  -consult nephrology for treatment in am       Consultations:     Dwayne Royce TO GI  IP CONSULT TO NEPHROLOGY  IP CONSULT TO PALLIATIVE CARE      Tammy Lynn MD   7/20/2022  10:45 AM    [facility] [address] [place] [address], [address] [place] [address]. Phone [phone]      Attending Physician Statement  I have discussed the care of Bethene Labs with the resident team. I have examined the patient myself and taken ros and hpi , including pertinent history and exam findings,  with the resident. I have reviewed the key elements of all parts of the encounter with the resident. I agree with the assessment, plan and orders as documented by the resident. Principal Problem:    Gastrointestinal hemorrhage  Active Problems:    Dependence on renal dialysis ([facility]    CKD stage 4 secondary to hypertension (HCC)    Paroxysmal atrial fibrillation (HCC)  Resolved Problems:    * No resolved hospital problems.  *        Recurrent lower GI bleed severe acute blood loss anemia hemoglobin 4.9 status post 2 units RBC transfusion started on PPI infusion holding anticoagulants, patient has had recurrent admissions with anemia, recent EGD and colonoscopy were both negative, plan was for capsule endoscopy as outpatient, GI consulted  A. fib RVR, not new anticoagulation on hold currently rate controlled  Troponin noted 67-->65, likely due to demand ischemia  ESRD on dialysis-nephrology consulted    Recent admission to [facility] for encephalopathy hypoxia requiring intubation, bradycardia requiring transcutaneous pacing, BLE wounds treated with IV antibiotics  Other history includes chronic diastolic CHF, CAD status post CABG 2003, COPD, type 2 diabetes  Recently started on hemodialysis June 2022        Electronically signed by Tammy Lynn MD

## 2022-07-20 NOTE — PROGRESS NOTES
Called by Dr. Lindy Pederson to evaluate pt for intubation. Arrived to room. Pt drowsy but wakes to voice. Speaking full sentences. RR 20-25. No resp distress. No hypoxia. Blood gas shows no hypercapnea. Glucose shows no hypoglycemia. I called Dr. Lindy Pederson. We do not think the patient needs to be intubated at this time.

## 2022-07-20 NOTE — PLAN OF CARE
Pt with further rectal bleeding, lethargic seen with dr Chaya Hardy, labs reviewed, stat bleeding scan ordered, house supervisor notified. Additional blood and ffp x2 units ordered by dr Chaya Hardy . Carolin Maria, APRN - CNP

## 2022-07-20 NOTE — PLAN OF CARE
Problem: Discharge Planning  Goal: Discharge to home or other facility with appropriate resources  Outcome: Progressing  Flowsheets  Taken 7/20/2022 1253  Discharge to home or other facility with appropriate resources: Refer to discharge planning if patient needs post-hospital services based on physician order or complex needs related to functional status, cognitive ability or social support system  Taken 7/20/2022 0945  Discharge to home or other facility with appropriate resources: Refer to discharge planning if patient needs post-hospital services based on physician order or complex needs related to functional status, cognitive ability or social support system     Problem: Chronic Conditions and Co-morbidities  Goal: Patient's chronic conditions and co-morbidity symptoms are monitored and maintained or improved  Recent Flowsheet Documentation  Taken 7/20/2022 1253 by Augusto Carrizales 34 - Patient's Chronic Conditions and Co-Morbidity Symptoms are Monitored and Maintained or Improved: Monitor and assess patient's chronic conditions and comorbid symptoms for stability, deterioration, or improvement  Taken 7/20/2022 0945 by Augusto Carrizales 34 - Patient's Chronic Conditions and Co-Morbidity Symptoms are Monitored and Maintained or Improved:   Monitor and assess patient's chronic conditions and comorbid symptoms for stability, deterioration, or improvement   Collaborate with multidisciplinary team to address chronic and comorbid conditions and prevent exacerbation or deterioration

## 2022-07-20 NOTE — CARE COORDINATION
CASE MANAGEMENT NOTE:    Admission Date:  7/19/2022 Angel Friedman is a 66 y.o.  female    Admitted for : Gastrointestinal hemorrhage [K92.2]  Gastrointestinal hemorrhage, unspecified gastrointestinal hemorrhage type [K92.2]  Anemia, unspecified type [D64.9]    Met with:  Family son [address] [place]    PCP:  [address] [place] [address] Drive:  Sydnee Messina Pending medicaid       Is patient alert and oriented at time of discussion:  No- drowsy    Current Residence/ Living Arrangements:  in nursing home from [facility] needed: Yes    Freedom of choice and list provided: Yes    Pharmacy:  [facility]       Is patient currently receiving oral anticoagulation therapy? No    Is the Patient an [facility] with Readmission Risk Score greater than 14%? No  If yes, pt needs a follow up appointment made within 7 days. Family Members/Caregivers that pt would like involved in their care:    Yes    If yes, list name here:  sam ventura and Karri Kelsey and Svitlana    Transportation Provider:  ambulance             Discharge Plan:  7/20/22 [facility]/PENDING MEDICAID From [facility] to follow for return . Admitted with GIB hgb 4.9, received blood transfusion overnight. On Eliquis PTA Pt was started on HD while at [address] [place] [address] and was to get HD at [facility]. IV protonix gtt, consult Nephro and GI. WILL NEEDS TO BE SIGNED/COMPLETED.  Following for needs//JF                Electronically signed by: Fili Enriquez RN on 7/20/2022 at 2:09 PM

## 2022-07-20 NOTE — PROGRESS NOTES
Dr Arminda Israel called and stated he is not in house. Dr Cally Montenegro to come up to the floor for intubation. He also stated to call Dr Alfred Parks for line placement.  Electronically signed by Geovany Boyd RN on 7/20/2022 at 4:39 PM

## 2022-07-20 NOTE — PROGRESS NOTES
Patient seen and examined at bedside with presence of nurse  Ms. Ojeda just received her second packed RBC when I saw her, hemoglobin today was 4.9, patient had a blood pressure of averaging 146-166 this morning    No events overnight, patient states that per daughter this seems more confused than she is however this has been the baseline since he came here. Not complain of any pain, change in breathing habits  When asked she does not have any vomiting nausea change in bowel habits.     Patient was hard to arouse, seems drowsy patient is oriented to date and self only, very pale patient, dental hygiene somehow poor, patient has pale mucous membranes as well    Patient had a clear lung fields, normal heart sounds

## 2022-07-20 NOTE — PROGRESS NOTES
Dr Amilcar Schmid at bedside. Patient brief active dark stools. Orders for stat PRBC 1 unit transfusion and 2 units of fresh frozen plasma.  Electronically signed by Dipika Lynch RN on 7/20/2022 at 4:38 PM

## 2022-07-20 NOTE — PLAN OF CARE
PRE CONSULT ROUNDING NOTE  HPI  66year old female with pmh of iron deficiency anemia, cva and afib on eliquis  dm copd chf esrd on hd who presented to the ED for rectal bleeding. Per the son the pt was recently admitted to [facility] for encephalopathy and hypoxia. She was discharged to a SNF and had an episode of bright red blood mixed with stool last night., FOBT positive She was admitted with hgb of 4.9, she did receive blood in the ICU and the repeat hgb is still pending. She has not had any BM or rectal bleeding since arriving to this facility. Per the notes she has had recurrent anemia and has had multiple transfusions in the past. She was supposed to have an outpt capsule endoscopy done but this has not been arranged yet due to her hospitalizations. She does take ferrous sulfate and has had black stool in the past. She has had mild confusion and lethargy recently also. She has not had recent abdominal imaging.  Creat 2.86  no leucocytosis anemia profile from 3/1/22 shows iron at 20 with 5 % saturation inr 1.8  lft's are normal. She has not had fevers chills dysphagia abdominal pain rash, her family feels that she has lost weight    Endoscopy 3/1/22 egd (alycia) normal colonoscopy same day showed diverticulosis    Family reports reports sister with pancreatic cancer, another sister with colon cancer no liver or stomach cancer no uc/crohns  Social no smoking no etoh or illicit drugs  BP (!) 594/44   Pulse 96   Temp 97.7 °F (36.5 °C) (Oral)   Resp 20   Wt 139 lb 1.8 oz (63.1 kg)   SpO2 98%   BMI 28.10 kg/m²     ROS as above meds labs imaging and past medical records were reviewed    Exam  General Appearance: ill appearing alert and oriented to person, and time, confused to place well-developed and well-nourished, in no acute distress  Skin: warm and dry, no rash or erythema pt is pale  Head: normocephalic and atraumatic  Eyes: pupils equal, round, and reactive to light, extraocular eye movements intact, conjunctivae normal  ENT: hearing grossly normal bilaterally  Neck: neck supple and non tender without mass, no thyromegaly or thyroid nodules, no cervical lymphadenopathy   Pulmonary/Chest: clear to auscultation bilaterally- no wheezes, rales or rhonchi, normal air movement, no respiratory distress  Cardiovascular: normal rate, regular rhythm, normal S1 and S2, no murmurs, rubs, clicks or gallops, distal pulses intact, no carotid bruits  Abdomen: soft, obese non tender, non-distended, normal bowel sounds, no masses or organomegaly no ascites  Extremities: no cyanosis, clubbing or edema  Musculoskeletal: normal range of motion, no joint swelling, deformity or tenderness  Neurologic: no cranial nerve deficit and generalized weakness    Assessment  Anemia with hematochezia  Iron deficiency anemia  ESRD on HD    Plan  Will discuss case with md Kleber medina  Hold the [facility]  Continue ppi  Clear diet  Iron replacement  May need repeat colonoscopy  Outpt pill cam  Formal gi consult to follow  . Celeste Santoro, APRN - CNP

## 2022-07-20 NOTE — CARE COORDINATION
DISCHARGE PLANNING NOTE:     [facility] notifcation :    Your notification ID is:  [id number]  Electronically signed by Luis Enrique Gray RN on 7/20/2022 at 9:21 AM

## 2022-07-20 NOTE — CONSULTS
Department of Internal Medicine  Nephrology Noemy Amezcua MD   Consult Note    Reason for consultation: Management of hemodialysis dependent new onset end-stage renal disease. Consulting physician: Beny Hu MD.    History of present illness: This is a 66 y.o. female with a significant past medical history of Cerebrovascular accident, combined diastolic and systolic heart failure, essential hypertension, partial deafness and chronic kidney disease stage IV [associated with nephrotic range proteinuria [16 g/g], who was admitted to [facility] from 6/27/22 to 7/18/2022 for treatment of hypoxia and bradycardia. She required endotracheal intubation and was extubated on 7/6/2022. Acute hemodialysis was started during that admission for management of worsening azotemia associated with edema and tunneled hemodialysis catheter was eventually placed on 7/8/2022. Patient was on a TTS hemodialysis schedule but was sent to the [facility] where she was supposed to be starting a MWF hemodialysis schedule. Within 24 hours of getting to the [facility], she developed black tarry stools and was sent to the hospital and admitted for GI bleed with hemoglobin 4.9 g/dL yesterday. She has remained relatively hemodynamically stable with systolic blood pressure greater than 100 mmHg. She is completing the first of 2 scheduled units of packed red blood transfusions and is due to be evaluated by GI service. She is hard of hearing but alert and oriented.     Latex, Aleve [naproxen sodium], Pioglitazone, Claritin [loratadine], Keflex [cephalexin], and Lisinopril    Past Medical History:   Diagnosis Date    Acute CVA (cerebrovascular accident) ([address] 07/25/2020    Acute on chronic combined systolic (congestive) and diastolic (congestive) heart failure ([address] 02/06/2022    Arthritis     Asthma     Bradycardia 06/12/2022    ESRD (end stage renal disease) ([facility] [address]     Essential hypertension 07/25/2020    Hallucinations 02/18/2021 Hard of hearing     Head contusion 2020    Iron deficiency anemia, unspecified     Meningioma ([facility] [address] 2021    Myocardial infarction (HCC)     Pure hypercholesterolemia     Type 2 diabetes mellitus with stage 4 chronic kidney disease, without long-term current use of insulin (HCC)     Unspecified venous (peripheral) insufficiency     Unspecified vitamin D deficiency      Scheduled Meds:   [Held by provider] pantoprazole (PROTONIX) 40 mg injection  40 mg IntraVENous Daily    budesonide-formoterol  2 puff Inhalation BID    hydrocortisone  25 mg Rectal BID    [Held by provider] QUEtiapine  25 mg Oral Nightly    [Held by provider] levothyroxine  25 mcg Oral Daily    [Held by provider] furosemide  80 mg Oral Daily    [Held by provider] atorvastatin  40 mg Oral Nightly    [Held by provider] amiodarone  100 mg Oral Daily    metoprolol  2.5 mg IntraVENous Q6H    sodium chloride flush  5-40 mL IntraVENous 2 times per day     Continuous Infusions:   pantoprazole 8 mg/hr (22 0153)    sodium chloride      sodium chloride       PRN Meds:.sodium chloride, albuterol sulfate HFA, sodium chloride flush, sodium chloride, ondansetron **OR** ondansetron, acetaminophen **OR** acetaminophen    Family History   Problem Relation Age of Onset    Diabetes Mother     Heart Disease Mother     Heart Disease Father     Diabetes Sister     High Blood Pressure Sister     Diabetes Maternal Grandmother         Social History     Socioeconomic History    Marital status:      Number of children: 6   Tobacco Use    Smoking status: Former     Packs/day: 0.25     Years: 10.00     Pack years: 2.50     Types: Cigarettes     Quit date: 2000     Years since quittin.5    Smokeless tobacco: Never   Vaping Use    Vaping Use: Never used   Substance and Sexual Activity    Alcohol use: Not Currently     Alcohol/week: 0.0 standard drinks    Drug use: No    Sexual activity: Yes     Partners: Female     Review of systems: CNS - no headache or dizziness; Cardiac - no chest pain; Respiratory - no shortness of breath; Gastrointestinal - No nausea or vomiting but she has black tarry stools; Musculoskeletal - general body aches; Skin/Integument - no rashes. Physical Exam:    VITALS:  BP (!) 145/41   Pulse (!) 101   Temp 98.1 °F (36.7 °C) (Oral)   Resp 23   Wt 139 lb 1.8 oz (63.1 kg)   SpO2 94%   BMI 28.10 kg/m²   TEMPERATURE:  Current - Temp: 98.1 °F (36.7 °C); Max - Temp  Av °F (36.7 °C)  Min: 97.5 °F (36.4 °C)  Max: 98.7 °F (37.1 °C)  RESPIRATIONS RANGE: Resp  Av.3  Min: 16  Max: 30  PULSE RANGE: Pulse  Av.4  Min: 79  Max: 105  BLOOD PRESSURE RANGE:  Systolic (39XCB), SHS:042 , Min:99 , FGF:094  ; Diastolic (64FHO), VPP:22, Min:36, Max:95   PULSE OXIMETRY RANGE: SpO2  Av.7 %  Min: 90 %  Max: 97 %  24HR INTAKE/OUTPUT:    Intake/Output Summary (Last 24 hours) at 2022 0950  Last data filed at 2022 0631  Gross per 24 hour   Intake 573.33 ml   Output --   Net 573.33 ml     Constitutional: alert, appears stated age, and cooperative    Skin: Skin color, texture, turgor normal. No rashes or lesions    Head: Normocephalic, without obvious abnormality, atraumatic     Cardiovascular/Edema: regular rate and rhythm, S1, S2 normal, no murmur, click, rub or gallop    Respiratory: Lungs: clear to auscultation bilaterally    Abdomen: soft, non-tender; bowel sounds normal; no masses,  no organomegaly    Back: symmetric, no curvature. ROM normal. No CVA tenderness.     Extremities: extremities normal, atraumatic, no cyanosis or edema    Neuro:  Grossly normal      CBC:   Recent Labs     22  1646 22  0947 22  0030   WBC  --  10.3 6.9   HGB 7.2* 7.5* 4.9*   PLT  --  210 203     BMP:    Recent Labs     22  0947 22  0030    137   K 4.5 4.8    101   CO2 28 26   BUN 62* 89*   CREATININE 2.24* 2.86*   GLUCOSE 131* 126*       Lab Results   Component Value Date/Time    NITRU NEGATIVE 2022 06:35 PM    COLORU Dark Yellow 06/27/2022 06:35 PM    PHUR 5.5 06/27/2022 06:35 PM    WBCUA 20 TO 50 06/27/2022 06:35 PM    RBCUA 20 TO 50 06/27/2022 06:35 PM    MUCUS 1+ 06/27/2022 06:35 PM    TRICHOMONAS NOT REPORTED 02/07/2022 02:42 AM    YEAST NOT REPORTED 02/07/2022 02:42 AM    BACTERIA FEW 06/12/2022 09:33 PM    SPECGRAV 1.030 06/27/2022 06:35 PM    LEUKOCYTESUR SMALL 06/27/2022 06:35 PM    UROBILINOGEN Normal 06/27/2022 06:35 PM    [address] NEGATIVE 06/27/2022 06:35 PM    GLUCOSEU 1+ 06/27/2022 06:35 PM    KETUA TRACE 06/27/2022 06:35 PM    AMORPHOUS NOT REPORTED 02/07/2022 02:42 AM     Urine Sodium:     Lab Results   Component Value Date/Time    SLOAN <20 06/08/2022 09:52 PM     Urine Protein:     Lab Results   Component Value Date/Time     10/27/2020 04:35 PM     Urine Creatinine:     Lab Results   Component Value Date/Time    LABCREA 63.0 06/08/2022 09:52 PM     IMPRESSION/RECOMMENDATIONS:      1.  End-stage renal disease - Secondary to diabetic nephropathy. We will place patient on a Monday/Wednesday/Friday hemodialysis schedule. She will receive acute hemodialysis today. 2.  GI bleed - okay to proceed with PRBC transfusion from renal standpoint. We will remove extra fluid load with dialysis and ultrafiltration. 3.  Systemic hypertension - blood pressure control is adequate. 4.  Severe anemia secondary to GI bleed. Patient however has underlying anemia of chronic kidney disease. Prognosis is guarded. Thank you very much for the courtesy and confidence of this consultation.     Marquis Haro MD FACP  Attending Nephrologist  7/20/2022 9:43 AM

## 2022-07-21 ENCOUNTER — ANESTHESIA EVENT (OUTPATIENT)
Dept: ENDOSCOPY | Age: 72
DRG: 377 | End: 2022-07-21
Payer: OTHER GOVERNMENT

## 2022-07-21 ENCOUNTER — ANESTHESIA (OUTPATIENT)
Dept: ENDOSCOPY | Age: 72
DRG: 377 | End: 2022-07-21
Payer: OTHER GOVERNMENT

## 2022-07-21 LAB
ANION GAP SERPL CALCULATED.3IONS-SCNC: 11 MMOL/L (ref 9–17)
BUN BLDV-MCNC: 62 MG/DL (ref 8–23)
CALCIUM SERPL-MCNC: 8.4 MG/DL (ref 8.6–10.4)
CHLORIDE BLD-SCNC: 105 MMOL/L (ref 98–107)
CO2: 27 MMOL/L (ref 20–31)
CREAT SERPL-MCNC: 2.08 MG/DL (ref 0.5–0.9)
GFR AFRICAN AMERICAN: 28 ML/MIN
GFR NON-AFRICAN AMERICAN: 23 ML/MIN
GFR SERPL CREATININE-BSD FRML MDRD: ABNORMAL ML/MIN/{1.73_M2}
GLUCOSE BLD-MCNC: 114 MG/DL (ref 70–99)
GLUCOSE BLD-MCNC: 99 MG/DL (ref 65–105)
HCT VFR BLD CALC: 20.6 % (ref 36–46)
HCT VFR BLD CALC: 27.3 % (ref 36–46)
HCT VFR BLD CALC: 28.8 % (ref 36–46)
HCT VFR BLD CALC: 29.5 % (ref 36–46)
HEMOGLOBIN: 10.1 G/DL (ref 12–16)
HEMOGLOBIN: 6.8 G/DL (ref 12–16)
HEMOGLOBIN: 9.1 G/DL (ref 12–16)
HEMOGLOBIN: 9.6 G/DL (ref 12–16)
INR BLD: 1.4
IRON SATURATION: 44 % (ref 20–55)
IRON: 80 UG/DL (ref 37–145)
LEGIONELLA PNEUMOPHILIA AG, URINE: NEGATIVE
PARTIAL THROMBOPLASTIN TIME: 29.5 SEC (ref 24–36)
POTASSIUM SERPL-SCNC: 4 MMOL/L (ref 3.7–5.3)
PROTHROMBIN TIME: 17.6 SEC (ref 11.8–14.6)
REASON FOR REJECTION: NORMAL
SODIUM BLD-SCNC: 143 MMOL/L (ref 135–144)
TOTAL IRON BINDING CAPACITY: 181 UG/DL (ref 250–450)
UNSATURATED IRON BINDING CAPACITY: 101 UG/DL (ref 112–347)
ZZ NTE CLEAN UP: ORDERED TEST: NORMAL
ZZ NTE WITH NAME CLEAN UP: SPECIMEN SOURCE: NORMAL

## 2022-07-21 PROCEDURE — 6370000000 HC RX 637 (ALT 250 FOR IP): Performed by: NURSE PRACTITIONER

## 2022-07-21 PROCEDURE — 2500000003 HC RX 250 WO HCPCS: Performed by: INTERNAL MEDICINE

## 2022-07-21 PROCEDURE — 94640 AIRWAY INHALATION TREATMENT: CPT

## 2022-07-21 PROCEDURE — 87154 CUL TYP ID BLD PTHGN 6+ TRGT: CPT

## 2022-07-21 PROCEDURE — 80048 BASIC METABOLIC PNL TOTAL CA: CPT

## 2022-07-21 PROCEDURE — 82947 ASSAY GLUCOSE BLOOD QUANT: CPT

## 2022-07-21 PROCEDURE — 6370000000 HC RX 637 (ALT 250 FOR IP): Performed by: INTERNAL MEDICINE

## 2022-07-21 PROCEDURE — 36415 COLL VENOUS BLD VENIPUNCTURE: CPT

## 2022-07-21 PROCEDURE — 2580000003 HC RX 258: Performed by: ANESTHESIOLOGY

## 2022-07-21 PROCEDURE — 6370000000 HC RX 637 (ALT 250 FOR IP): Performed by: STUDENT IN AN ORGANIZED HEALTH CARE EDUCATION/TRAINING PROGRAM

## 2022-07-21 PROCEDURE — 36430 TRANSFUSION BLD/BLD COMPNT: CPT

## 2022-07-21 PROCEDURE — 94761 N-INVAS EAR/PLS OXIMETRY MLT: CPT

## 2022-07-21 PROCEDURE — 85014 HEMATOCRIT: CPT

## 2022-07-21 PROCEDURE — 2580000003 HC RX 258: Performed by: INTERNAL MEDICINE

## 2022-07-21 PROCEDURE — 2580000003 HC RX 258: Performed by: EMERGENCY MEDICINE

## 2022-07-21 PROCEDURE — 90935 HEMODIALYSIS ONE EVALUATION: CPT

## 2022-07-21 PROCEDURE — 2000000000 HC ICU R&B

## 2022-07-21 PROCEDURE — 85018 HEMOGLOBIN: CPT

## 2022-07-21 PROCEDURE — 83540 ASSAY OF IRON: CPT

## 2022-07-21 PROCEDURE — 7100000000 HC PACU RECOVERY - FIRST 15 MIN: Performed by: INTERNAL MEDICINE

## 2022-07-21 PROCEDURE — C9113 INJ PANTOPRAZOLE SODIUM, VIA: HCPCS | Performed by: EMERGENCY MEDICINE

## 2022-07-21 PROCEDURE — 6360000002 HC RX W HCPCS: Performed by: EMERGENCY MEDICINE

## 2022-07-21 PROCEDURE — 2500000003 HC RX 250 WO HCPCS: Performed by: NURSE PRACTITIONER

## 2022-07-21 PROCEDURE — 3609017100 HC EGD: Performed by: INTERNAL MEDICINE

## 2022-07-21 PROCEDURE — 0DJ08ZZ INSPECTION OF UPPER INTESTINAL TRACT, VIA NATURAL OR ARTIFICIAL OPENING ENDOSCOPIC: ICD-10-PCS | Performed by: INTERNAL MEDICINE

## 2022-07-21 PROCEDURE — P9017 PLASMA 1 DONOR FRZ W/IN 8 HR: HCPCS

## 2022-07-21 PROCEDURE — 99233 SBSQ HOSP IP/OBS HIGH 50: CPT | Performed by: INTERNAL MEDICINE

## 2022-07-21 PROCEDURE — 2700000000 HC OXYGEN THERAPY PER DAY

## 2022-07-21 PROCEDURE — 87205 SMEAR GRAM STAIN: CPT

## 2022-07-21 PROCEDURE — 87040 BLOOD CULTURE FOR BACTERIA: CPT

## 2022-07-21 PROCEDURE — C9113 INJ PANTOPRAZOLE SODIUM, VIA: HCPCS | Performed by: INTERNAL MEDICINE

## 2022-07-21 PROCEDURE — 85610 PROTHROMBIN TIME: CPT

## 2022-07-21 PROCEDURE — 85730 THROMBOPLASTIN TIME PARTIAL: CPT

## 2022-07-21 PROCEDURE — 6360000002 HC RX W HCPCS: Performed by: INTERNAL MEDICINE

## 2022-07-21 PROCEDURE — 6360000002 HC RX W HCPCS: Performed by: ANESTHESIOLOGY

## 2022-07-21 PROCEDURE — 2580000003 HC RX 258: Performed by: NURSE PRACTITIONER

## 2022-07-21 PROCEDURE — 2709999900 HC NON-CHARGEABLE SUPPLY: Performed by: INTERNAL MEDICINE

## 2022-07-21 PROCEDURE — 86927 PLASMA FRESH FROZEN: CPT

## 2022-07-21 PROCEDURE — P9040 RBC LEUKOREDUCED IRRADIATED: HCPCS

## 2022-07-21 PROCEDURE — 7100000001 HC PACU RECOVERY - ADDTL 15 MIN: Performed by: INTERNAL MEDICINE

## 2022-07-21 PROCEDURE — 43235 EGD DIAGNOSTIC BRUSH WASH: CPT | Performed by: INTERNAL MEDICINE

## 2022-07-21 PROCEDURE — 83550 IRON BINDING TEST: CPT

## 2022-07-21 PROCEDURE — 3700000000 HC ANESTHESIA ATTENDED CARE: Performed by: INTERNAL MEDICINE

## 2022-07-21 RX ORDER — SODIUM CHLORIDE 9 MG/ML
INJECTION, SOLUTION INTRAVENOUS CONTINUOUS
Status: DISCONTINUED | OUTPATIENT
Start: 2022-07-21 | End: 2022-07-21 | Stop reason: HOSPADM

## 2022-07-21 RX ORDER — SULFAMETHOXAZOLE AND TRIMETHOPRIM 800; 160 MG/1; MG/1
1 TABLET ORAL EVERY 12 HOURS SCHEDULED
Status: DISCONTINUED | OUTPATIENT
Start: 2022-07-21 | End: 2022-07-22

## 2022-07-21 RX ORDER — PROPOFOL 10 MG/ML
INJECTION, EMULSION INTRAVENOUS PRN
Status: DISCONTINUED | OUTPATIENT
Start: 2022-07-21 | End: 2022-07-21 | Stop reason: SDUPTHER

## 2022-07-21 RX ORDER — MIDAZOLAM HYDROCHLORIDE 1 MG/ML
INJECTION INTRAMUSCULAR; INTRAVENOUS PRN
Status: DISCONTINUED | OUTPATIENT
Start: 2022-07-21 | End: 2022-07-21 | Stop reason: SDUPTHER

## 2022-07-21 RX ADMIN — ACETAMINOPHEN 325MG 650 MG: 325 TABLET ORAL at 05:37

## 2022-07-21 RX ADMIN — SODIUM CHLORIDE: 9 INJECTION, SOLUTION INTRAVENOUS at 13:23

## 2022-07-21 RX ADMIN — Medication 1.9 ML: at 19:41

## 2022-07-21 RX ADMIN — SULFAMETHOXAZOLE AND TRIMETHOPRIM 1 TABLET: 800; 160 TABLET ORAL at 21:18

## 2022-07-21 RX ADMIN — Medication 2 PUFF: at 20:08

## 2022-07-21 RX ADMIN — METOPROLOL TARTRATE 2.5 MG: 5 INJECTION, SOLUTION INTRAVENOUS at 00:12

## 2022-07-21 RX ADMIN — SODIUM CHLORIDE 8 MG/HR: 9 INJECTION, SOLUTION INTRAVENOUS at 06:14

## 2022-07-21 RX ADMIN — Medication 1.8 ML: at 19:41

## 2022-07-21 RX ADMIN — HYDROCORTISONE ACETATE 25 MG: 25 SUPPOSITORY RECTAL at 21:18

## 2022-07-21 RX ADMIN — SODIUM CHLORIDE 8 MG/HR: 9 INJECTION, SOLUTION INTRAVENOUS at 16:45

## 2022-07-21 RX ADMIN — PROPOFOL 10 MG: 10 INJECTION, EMULSION INTRAVENOUS at 13:53

## 2022-07-21 RX ADMIN — MIDAZOLAM 1 MG: 1 INJECTION INTRAMUSCULAR; INTRAVENOUS at 13:47

## 2022-07-21 RX ADMIN — HYDROCORTISONE ACETATE 25 MG: 25 SUPPOSITORY RECTAL at 09:16

## 2022-07-21 RX ADMIN — METOPROLOL TARTRATE 2.5 MG: 5 INJECTION, SOLUTION INTRAVENOUS at 05:33

## 2022-07-21 RX ADMIN — TIOTROPIUM BROMIDE INHALATION SPRAY 2 PUFF: 3.12 SPRAY, METERED RESPIRATORY (INHALATION) at 11:01

## 2022-07-21 RX ADMIN — SODIUM CHLORIDE, PRESERVATIVE FREE 10 ML: 5 INJECTION INTRAVENOUS at 09:23

## 2022-07-21 RX ADMIN — Medication 2 PUFF: at 07:30

## 2022-07-21 RX ADMIN — SODIUM CHLORIDE, PRESERVATIVE FREE 10 ML: 5 INJECTION INTRAVENOUS at 21:18

## 2022-07-21 ASSESSMENT — LIFESTYLE VARIABLES: SMOKING_STATUS: 0

## 2022-07-21 ASSESSMENT — ENCOUNTER SYMPTOMS
SHORTNESS OF BREATH: 0
STRIDOR: 0

## 2022-07-21 ASSESSMENT — PAIN SCALES - GENERAL
PAINLEVEL_OUTOF10: 3
PAINLEVEL_OUTOF10: 0
PAINLEVEL_OUTOF10: 0

## 2022-07-21 ASSESSMENT — PAIN DESCRIPTION - LOCATION: LOCATION: HEAD

## 2022-07-21 ASSESSMENT — COPD QUESTIONNAIRES: CAT_SEVERITY: NO INTERVAL CHANGE

## 2022-07-21 ASSESSMENT — PAIN - FUNCTIONAL ASSESSMENT: PAIN_FUNCTIONAL_ASSESSMENT: 0-10

## 2022-07-21 NOTE — ANESTHESIA POSTPROCEDURE EVALUATION
POST- ANESTHESIA EVALUATION       Pt Name: Nikhil Peacock  MRN: [id number]  YOB: 1950  Date of evaluation: 7/21/2022  Time:  3:01 PM      /67   Pulse (!) 108   Temp 99.6 °F (37.6 °C)   Resp 17   Ht 4' 11\" (1.499 m)   Wt 144 lb 2.9 oz (65.4 kg)   SpO2 99%   BMI 29.12 kg/m²      Consciousness Level  Awake  Cardiopulmonary Status  Stable  Pain Adequately Treated YES  Nausea / Vomiting  NO  Adequate Hydration  YES  Anesthesia Related Complications NONE      Electronically signed by Gabriel White MD on 7/21/2022 at 3:01 PM       Department of Anesthesiology  Postprocedure Note    Patient: Nikhil Peacock  MRN: [id number]  YOB: 1950  Date of evaluation: 7/21/2022      Procedure Summary     Date: 07/21/22 Room / Location: [place] [address] / [facility]    Anesthesia Start: 2958 Anesthesia Stop: 5299    Procedure: EGD ESOPHAGOGASTRODUODENOSCOPY (Esophagus) Diagnosis:       Anemia, unspecified type      Rectal bleeding      Melena      (Anemia, unspecified type [D64.9])      (Rectal bleeding [K62.5])      (Melena [K92.1])    Surgeons: Cary Felipe MD Responsible Provider: Gabriel White MD    Anesthesia Type: MAC ASA Status: 4 - Emergent          Anesthesia Type: MAC    Royer Phase I: Royer Score: 8    Royer Phase II:        Anesthesia Post Evaluation

## 2022-07-21 NOTE — FLOWSHEET NOTE
07/20/22 1415   Vital Signs   Weight 139 lb 1.8 oz (63.1 kg)   Weight Method Bed scale   Percent Weight Change 0   Treatment   Time On 1435   Time Off 1736   Technical Checks   Dialysis Machine No. [id number]   RO Machine Number [id number]   All Connections Secure Yes   NS Bag Yes   Saline Line Double Clamped Yes   Dialyzer F-180   Prime Volume (mL) 200 mL   ICEBOAT I;C;E;B;O;A;T   RO Machine Log Sheet Completed Yes   Machine Alarm Self Test Passed; Completed   Air Foam Detector Tested;Proper Function   Extracorporeal Circuit Tested for Integrity Yes   Machine Conductivity 14.0   Manual Conductivity 13.9   Manual Ph 7.4   Bleach Test (Neg) Yes   Bath Temperature 96.8 °F (36 °C)   Treatment Initiation   Dialyze Hours 3   Dialysis Bath   K+ (Potassium) 2   Ca+ (Calcium) 3   Na+ (Sodium) 140   HCO3 (Bicarb) 35

## 2022-07-21 NOTE — PROGRESS NOTES
Department of Internal Medicine  Nephrology MD Elysia   Consult Note    Reason for consultation: Management of hemodialysis dependent new onset end-stage renal disease. Consulting physician: Js Mercado MD.      Subjective/interval history. Patient seen and examined patient is doing better more awake and alert, patient is not in acute acute respiratory distress. Patient was unable to do dialysis yesterday as she was unstable  Patient scheduled for EGD today    History of present illness: This is a 66 y.o. female with a significant past medical history of Cerebrovascular accident, combined diastolic and systolic heart failure, essential hypertension, partial deafness and chronic kidney disease stage IV [associated with nephrotic range proteinuria [16 g/g], who was admitted to [facility] from 6/27/22 to 7/18/2022 for treatment of hypoxia and bradycardia. She required endotracheal intubation and was extubated on 7/6/2022. Acute hemodialysis was started during that admission for management of worsening azotemia associated with edema and tunneled hemodialysis catheter was eventually placed on 7/8/2022. Patient was on a TTS hemodialysis schedule but was sent to the [facility] where she was supposed to be starting a MWF hemodialysis schedule. Within 24 hours of getting to the F, she developed black tarry stools and was sent to the hospital and admitted for GI bleed with hemoglobin 4.9 g/dL yesterday. She has remained relatively hemodynamically stable with systolic blood pressure greater than 100 mmHg. She is completing the first of 2 scheduled units of packed red blood transfusions and is due to be evaluated by GI service. She is hard of hearing but alert and oriented.     Latex, Aleve [naproxen sodium], Pioglitazone, Claritin [loratadine], Keflex [cephalexin], and Lisinopril    Past Medical History:   Diagnosis Date    Acute CVA (cerebrovascular accident) ([facility] 07/25/2020    Acute on chronic combined systolic (congestive) and diastolic (congestive) heart failure (HCC) 02/06/2022    Arthritis     Asthma     Bradycardia 06/12/2022    ESRD (end stage renal disease) ([facility]     Essential hypertension 07/25/2020    Hallucinations 02/18/2021    Hard of hearing     Head contusion 09/09/2020    Iron deficiency anemia, unspecified     Meningioma ([facility] 02/25/2021    Myocardial infarction ([facility]     Pure hypercholesterolemia     Type 2 diabetes mellitus with stage 4 chronic kidney disease, without long-term current use of insulin ([facility])     Unspecified venous (peripheral) insufficiency     Unspecified vitamin D deficiency      Scheduled Meds:   tiotropium  2 puff Inhalation Daily    anticoagulant sodium citrate  1.8 mL IntraCATHeter Once    anticoagulant sodium citrate  1.9 mL IntraCATHeter Once    sulfamethoxazole-trimethoprim  1 tablet Oral 2 times per day    [Held by provider] pantoprazole (PROTONIX) 40 mg injection  40 mg IntraVENous Daily    budesonide-formoterol  2 puff Inhalation BID    hydrocortisone  25 mg Rectal BID    [Held by provider] QUEtiapine  25 mg Oral Nightly    [Held by provider] levothyroxine  25 mcg Oral Daily    [Held by provider] furosemide  80 mg Oral Daily    [Held by provider] atorvastatin  40 mg Oral Nightly    [Held by provider] amiodarone  100 mg Oral Daily    metoprolol  2.5 mg IntraVENous Q6H    sodium chloride flush  5-40 mL IntraVENous 2 times per day     Continuous Infusions:   pantoprazole 8 mg/hr (07/21/22 0614)    sodium chloride      sodium chloride      sodium chloride      sodium chloride      sodium chloride       PRN Meds:.sodium chloride, albuterol sulfate HFA, sodium chloride flush, sodium chloride, ondansetron **OR** ondansetron, acetaminophen **OR** acetaminophen, sodium chloride, sodium chloride, sodium chloride, sodium chloride flush, sodium chloride flush    Family History   Problem Relation Age of Onset    Diabetes Mother     Heart Disease Mother     Heart Disease Father     Diabetes Sister     High Blood Pressure Sister     Diabetes Maternal Grandmother         Social History     Socioeconomic History    Marital status:      Spouse name: None    Number of children: 6    Years of education: None    Highest education level: None   Tobacco Use    Smoking status: Former     Packs/day: 0.25     Years: 10.00     Pack years: 2.50     Types: Cigarettes     Quit date: 2000     Years since quittin.5    Smokeless tobacco: Never   Vaping Use    Vaping Use: Never used   Substance and Sexual Activity    Alcohol use: Not Currently     Alcohol/week: 0.0 standard drinks    Drug use: No    Sexual activity: Yes     Partners: Female     Review of systems: CNS - no headache or dizziness; Cardiac - no chest pain; Respiratory - no shortness of breath; Gastrointestinal - No nausea or vomiting but she has black tarry stools; Musculoskeletal - general body aches; Skin/Integument - no rashes. Physical Exam:    VITALS:  BP (!) 143/36   Pulse 98   Temp 97.6 °F (36.4 °C) (Oral)   Resp 22   Ht 4' 11\" (1.499 m)   Wt 144 lb 2.9 oz (65.4 kg)   SpO2 99%   BMI 29.12 kg/m²   TEMPERATURE:  Current - Temp: 97.6 °F (36.4 °C);  Max - Temp  Av.7 °F (37.1 °C)  Min: 96.8 °F (36 °C)  Max: 99.8 °F (37.7 °C)  RESPIRATIONS RANGE: Resp  Av.9  Min: 17  Max: 33  PULSE RANGE: Pulse  Av.9  Min: 87  Max: 118  BLOOD PRESSURE RANGE:  Systolic (11ASM), ZUY:626 , Min:102 , SBL:402 ; Diastolic (95OQU), CJV:64, Min:31, Max:112  PULSE OXIMETRY RANGE: SpO2  Av.3 %  Min: 84 %  Max: 100 %  24HR INTAKE/OUTPUT:    Intake/Output Summary (Last 24 hours) at 2022 1722  Last data filed at 2022 1401  Gross per 24 hour   Intake 1130.83 ml   Output 100 ml   Net 1030.83 ml     Constitutional: alert, appears stated age, and cooperative    Skin: Skin color, texture, turgor normal. No rashes or lesions    Head: Normocephalic, without obvious abnormality, atraumatic Cardiovascular/Edema: regular rate and rhythm, S1, S2 normal, no murmur, click, rub or gallop    Respiratory: Lungs: clear to auscultation bilaterally    Abdomen: soft, non-tender; bowel sounds normal; no masses,  no organomegaly    Back: symmetric, no curvature. ROM normal. No CVA tenderness. Extremities: extremities normal, atraumatic, no cyanosis or edema    Neuro:  Grossly normal      CBC:   Recent Labs     07/20/22  0030 07/20/22  1511 07/20/22  1841 07/21/22  0345 07/21/22  0818   WBC 6.9  --   --   --   --    HGB 4.9*   < > 6.8* 6.8* 9.6*     --   --   --   --     < > = values in this interval not displayed. BMP:    Recent Labs     07/20/22  0030 07/20/22  1511 07/21/22  0345    140 143   K 4.8 3.7 4.0    102 105   CO2 26 27 27   BUN 89* 62* 62*   CREATININE 2.86* 1.72* 2.08*   GLUCOSE 126* 114* 114*       Lab Results   Component Value Date/Time    NITRU NEGATIVE 07/20/2022 06:00 PM    COLORU Dark Yellow 07/20/2022 06:00 PM    PHUR 5.5 07/20/2022 06:00 PM    WBCUA 21 TO 50 07/20/2022 06:00 PM    RBCUA 51  07/20/2022 06:00 PM    MUCUS 1+ 06/27/2022 06:35 PM    TRICHOMONAS NOT REPORTED 02/07/2022 02:42 AM    YEAST NOT REPORTED 02/07/2022 02:42 AM    BACTERIA MANY 07/20/2022 06:00 PM    SPECGRAV 1.016 07/20/2022 06:00 PM    LEUKOCYTESUR MOD 07/20/2022 06:00 PM    UROBILINOGEN Normal 07/20/2022 06:00 PM    BILIRUBINUR NEGATIVE 07/20/2022 06:00 PM    GLUCOSEU TRACE 07/20/2022 06:00 PM    KETUA TRACE 07/20/2022 06:00 PM    AMORPHOUS 1+ 07/20/2022 06:00 PM     Urine Sodium:     Lab Results   Component Value Date/Time    SLOAN <20 06/08/2022 09:52 PM     Urine Protein:     Lab Results   Component Value Date/Time     10/27/2020 04:35 PM     Urine Creatinine:     Lab Results   Component Value Date/Time    LABCREA 63.0 06/08/2022 09:52 PM     IMPRESSION/RECOMMENDATIONS:      1.  End-stage renal disease - Secondary to diabetic nephropathy.  We will place patient on a Monday/Wednesday/Friday hemodialysis schedule. Patient was unable to complete her dialysis yesterday she will receive acute hemodialysis today. 2.  GI bleed -patient scheduled for EGD    3. Systemic hypertension - blood pressure control is adequate. 4.  Severe anemia secondary to GI bleed. Patient however has underlying anemia of chronic kidney disease. Plan  Hemodialysis today after EGD    Prognosis is guarded. Thank you very much for the courtesy and confidence of this consultation.     Maylin Villa MD   Attending Nephrologist  7/21/2022 5:22 PM

## 2022-07-21 NOTE — FLOWSHEET NOTE
Pt's son Ezekiel Wilde was at bedside. Pt was glad to see a  and talked about her large extended family whom she was close with. There has been several losses over past year or so, and pt expressed sadness. She is also wondering when it will be \"my time. \" Pt was thankful for prayer and listening presence. 07/21/22 1206   Encounter Summary   Encounter Overview/Reason  Spiritual/Emotional Needs   Service Provided For: Patient and family together   Referral/Consult From: [facility];Family members   Last Encounter  07/21/22   Complexity of Encounter Moderate   Spiritual/Emotional needs   Type Spiritual Support   Palliative Care   Type Palliative Care, Follow-up   Assessment/Intervention/Outcome   Assessment Anxious; Compromised coping   Intervention Active listening;Discussed belief system/Denominational practices/sandra;Discussed illness injury and its impact; Discussed meaning/purpose;Explored/Affirmed feelings, thoughts, concerns;Explored Coping Skills/Resources; Life review/Legacy;Grief Care;Prayer (assurance of)/[place];Sustaining Presence/Ministry of presence   Outcome Comfort;Engaged in conversation;Expressed feelings, needs, and concerns;Expressed Gratitude;Receptive

## 2022-07-21 NOTE — PLAN OF CARE
Problem: Discharge Planning  Goal: Discharge to home or other facility with appropriate resources  7/21/2022 1832 by Woody Oliva RN  Outcome: Progressing  7/21/2022 0734 by Malina Garrett RN  Outcome: Progressing     Problem: Skin/Tissue Integrity  Goal: Absence of new skin breakdown  7/21/2022 1832 by Woody Oliva RN  Outcome: Progressing  7/21/2022 0734 by Malina Garrett RN  Outcome: Progressing     Problem: Safety - Adult  Goal: Free from fall injury  7/21/2022 1832 by Woody Oliva RN  Outcome: Progressing  Flowsheets (Taken 7/21/2022 2831)  Free From Fall Injury: Based on caregiver fall risk screen, instruct family/caregiver to ask for assistance with transferring infant if caregiver noted to have fall risk factors  7/21/2022 0734 by Malina Garrett RN  Outcome: Progressing     Problem: ABCDS Injury Assessment  Goal: Absence of physical injury  7/21/2022 1832 by Woody Oliva RN  Outcome: Progressing  7/21/2022 0734 by Malina Garrett RN  Outcome: Progressing     Problem: Chronic Conditions and Co-morbidities  Goal: Patient's chronic conditions and co-morbidity symptoms are monitored and maintained or improved  7/21/2022 1832 by Woody Oliva RN  Outcome: Progressing  7/21/2022 0734 by Malina Garrett RN  Outcome: Progressing  Flowsheets  Taken 7/21/2022 0701 by Woody Oliva RN  Care Plan - Patient's Chronic Conditions and Co-Morbidity Symptoms are Monitored and Maintained or Improved:   Monitor and assess patient's chronic conditions and comorbid symptoms for stability, deterioration, or improvement   Collaborate with multidisciplinary team to address chronic and comorbid conditions and prevent exacerbation or deterioration   Update acute care plan with appropriate goals if chronic or comorbid symptoms are exacerbated and prevent overall improvement and discharge  Taken 7/20/2022 1800 by Augusto Montes [address] - Patient's Chronic Conditions and Co-Morbidity Symptoms are Monitored and Maintained or Improved:   Monitor and assess patient's chronic conditions and comorbid symptoms for stability, deterioration, or improvement   Collaborate with multidisciplinary team to address chronic and comorbid conditions and prevent exacerbation or deterioration     Problem: Pain  Goal: Verbalizes/displays adequate comfort level or baseline comfort level  7/21/2022 1832 by Woody Oliva RN  Outcome: Progressing  7/21/2022 0734 by Malina Garrett RN  Outcome: Progressing

## 2022-07-21 NOTE — PROGRESS NOTES
Writer updated Ronald Sanderson NP (GI) on patient's condition, hemoglobin, bloody stools x 3 this am. Order received to keep patient NPO at this time until evaluated by GI.

## 2022-07-21 NOTE — PROGRESS NOTES
[address]   OCCUPATIONAL THERAPY MISSED TREATMENT NOTE   INPATIENT   Date: 22  Patient Name: Rey Ojeda       Room: [facility] [place] [facility] Lili Gallagher  MRN: [id number]   Account #: [de-identified]    : 1950  (72 y.o.)  Gender: female                 REASON FOR MISSED TREATMENT:  Patient unable to participate   -   Surgery  - EGD ESOPHAGOGASTRODUODENOSCOPY    Beronica Gardner OT

## 2022-07-21 NOTE — PROGRESS NOTES
Pulmonary Progress Note  Pulmonary and Critical Care Specialists      Patient - Unique Barajas,  Age - 66 y.o.    - 1950      Room Number -    MRN -  [id number]   [facility]  Date of Admission -  2022 11:42 PM        Consulting Darius Wolff MD  Primary Care Physician - Kristi Clark MD     SUBJECTIVE   Patient appears to be in better spirits. More lucid. She appears to have very good insight much improved from yesterday. Son Azeem Newman at bedside    OBJECTIVE   VITALS    height is 4' 11\" (1.499 m) and weight is 144 lb 2.9 oz (65.4 kg). Her oral temperature is 98.5 °F (36.9 °C). Her blood pressure is 136/85 and her pulse is 97. Her respiration is 18 and oxygen saturation is 93%. Body mass index is 29.12 kg/m². Temperature Range: Temp: 98.5 °F (36.9 °C) Temp  Av.8 °F (37.1 °C)  Min: 97.7 °F (36.5 °C)  Max: 99.8 °F (37.7 °C)  BP Range:  Systolic (10OTD), MARIA ALEJANDRA:571 , Min:75 , GVQ:734     Diastolic (04GOF), UDN:56, Min:26, Max:112    Pulse Range: Pulse  Av.8  Min: 90  Max: 121  Respiration Range: Resp  Av.1  Min: 18  Max: 33  Current Pulse Ox[de-identified]  SpO2: 93 %  24HR Pulse Ox Range:  SpO2  Av.1 %  Min: 86 %  Max: 100 %  Oxygen Amount and Delivery: Wt Readings from Last 3 Encounters:   22 144 lb 2.9 oz (65.4 kg)   22 133 lb 9.6 oz (60.6 kg)   22 152 lb (68.9 kg)       I/O (24 Hours)    Intake/Output Summary (Last 24 hours) at 2022 1031  Last data filed at 2022 0701  Gross per 24 hour   Intake 2201.66 ml   Output 1721 ml   Net 480.66 ml       EXAM     General Appearance  Awake, alert, oriented, in no acute distress  HEENT - normocephalic, atraumatic. Neck - Supple,  trachea midline   Lungs -coarse breath sounds with crackles rales or wheezes  Heart Exam:PMI normal. No lifts, heaves, or thrills. RRR.  No murmurs, clicks, gallops, or rubs  Abdomen Exam: Abdomen soft, non-tender   Extremity Exam: Phosphorus:    Lab Results   Component Value Date/Time    PHOS 3.1 07/13/2022 08:29 AM        LIVER PROFILE   Recent Labs     07/20/22  0030   AST 15   ALT 7   LIPASE 18   BILITOT 0.60   ALKPHOS 93     INR   Recent Labs     07/21/22  0345   INR 1.4     PTT   Lab Results   Component Value Date    APTT 29.5 07/21/2022         RADIOLOGY     (See actual reports for details)    ASSESSMENT/PLAN     Patient Active Problem List   Diagnosis    Vitamin D deficiency    Venous insufficiency of both lower extremities    Type 2 diabetes mellitus with stage 4 chronic kidney disease, without long-term current use of insulin (HCC)    Coronary artery disease involving native coronary artery of native heart without angina pectoris    Iron deficiency anemia    Stage 4 chronic kidney disease ([address]    Colonoscopy refused    Pneumococcal vaccination declined    Partial deafness of both ears    Acute on chronic diastolic congestive heart failure (HCC)    COPD (chronic obstructive pulmonary disease) ([address]    CKD stage 4 secondary to hypertension ([address]    Gout with tophi    Hyperlipidemia with target LDL less than 70    Dry skin dermatitis HANDS    Toxic metabolic encephalopathy    Meningioma, cerebral (HCC)    Paroxysmal atrial fibrillation ([address]    Influenza vaccination declined    Recurrent UTI    Kidney stones    Diverticulosis    COVID-19 vaccination declined    JOY (acute kidney injury) ([address]    Chronic venous stasis dermatitis of both lower extremities    Symptomatic anemia    Family history of colon cancer    Family history of pancreatic cancer    Mild malnutrition (HCC)    Decreased strength, endurance, and mobility    Bilateral lower extremity edema    Leg ulcer, left, with fat layer exposed ([address]    Lymphedema    Peripheral vascular disease, unspecified ([address]    Ulcer of right leg, with fat layer exposed ([address]    Acute kidney injury superimposed on CKD ([address]    Bradycardia    Obesity (BMI 30-39. 9)    TSH elevation    Chronic ulcer of left leg with fat layer exposed ([address]    Hyperkalemia    Shock ([address]    Acute respiratory failure with hypoxia and hypercapnia (HCC)    Pulmonary edema cardiac cause (HCC)    Dependence on renal dialysis ([address]    Nephrotic range proteinuria    External hemorrhoid    Dysphagia    Debility    Sepsis ([address]    Arterial hypotension    Gastrointestinal hemorrhage       IMPRESSION:  The patient with:  1. Altered mental status. This is markedly improved  2. End-stage renal disease on hemodialysis. 3.  History of stroke. 4.  History of COPD.  5.  History of AFib. 6.  Recent hospitalization requiring mechanical ventilation. Patient's mentation markedly improved. I was told that gastroenterology is following the patient for potential EGD. Patient being evaluated for dialysis on a daily basis by nephrology. On EPC cuffs. On bronchodilator protocol.   We will add Spiriva     Intermediate care reasonable    Electronically signed by Georgie Luis MD on 7/21/2022 at 10:31 AM

## 2022-07-21 NOTE — PROGRESS NOTES
Dr. Jayy Glass updated on patient recent bleeding scan and current status. Order received to transfer last plasma, then recheck hemoglobin, transfuse hemoglobin <7.5.

## 2022-07-21 NOTE — PLAN OF CARE
Problem: Discharge Planning  Goal: Discharge to home or other facility with appropriate resources  7/21/2022 0734 by Malina Garrett RN  Outcome: Progressing  7/20/2022 1939 by Crow Craig RN  Outcome: Progressing  Flowsheets  Taken 7/20/2022 1800  Discharge to home or other facility with appropriate resources: Refer to discharge planning if patient needs post-hospital services based on physician order or complex needs related to functional status, cognitive ability or social support system  Taken 7/20/2022 1253  Discharge to home or other facility with appropriate resources: Refer to discharge planning if patient needs post-hospital services based on physician order or complex needs related to functional status, cognitive ability or social support system  Taken 7/20/2022 0945  Discharge to home or other facility with appropriate resources: Refer to discharge planning if patient needs post-hospital services based on physician order or complex needs related to functional status, cognitive ability or social support system     Problem: Skin/Tissue Integrity  Goal: Absence of new skin breakdown  7/21/2022 0734 by Malina Garrett RN  Outcome: Progressing  7/20/2022 1939 by Crow Craig RN  Outcome: Progressing     Problem: Safety - Adult  Goal: Free from fall injury  7/21/2022 0734 by Malina Garrett RN  Outcome: Progressing  7/20/2022 1939 by Crow Craig RN  Outcome: Progressing  Flowsheets (Taken 7/20/2022 1225)  Free From Fall Injury: Instruct family/caregiver on patient safety     Problem: ABCDS Injury Assessment  Goal: Absence of physical injury  7/21/2022 0734 by Malina Garrett RN  Outcome: Progressing  7/20/2022 1939 by Crow Craig RN  Outcome: Progressing  Flowsheets (Taken 7/20/2022 1225)  Absence of Physical Injury: Implement safety measures based on patient assessment     Problem: Chronic Conditions and Co-morbidities  Goal: Patient's chronic conditions and co-morbidity symptoms are monitored and maintained or improved  Outcome: Progressing  Flowsheets (Taken 7/20/2022 1800 by Marc Avalos RN)  Care Plan - Patient's Chronic Conditions and Co-Morbidity Symptoms are Monitored and Maintained or Improved:   Monitor and assess patient's chronic conditions and comorbid symptoms for stability, deterioration, or improvement   Collaborate with multidisciplinary team to address chronic and comorbid conditions and prevent exacerbation or deterioration     Problem: Pain  Goal: Verbalizes/displays adequate comfort level or baseline comfort level  Outcome: Progressing

## 2022-07-21 NOTE — FLOWSHEET NOTE
07/20/22 1635   Vital Signs   BP (!) 117/45   Heart Rate (!) 120   Resp 24   SpO2 (!) 86 %   Weight 137 lb 12.6 oz (62.5 kg)   Weight Method Estimated   Percent Weight Change -0.95   Post-Hemodialysis Assessment   Post-Treatment Procedures Blood returned;Catheter capped, clamped with Citrate x 2 ports   Machine Disinfection Process Acid/Vinegar Clean;Heat Disinfect; Exterior Machine Disinfection   Rinseback Volume (ml) 500 ml   Blood Volume Processed (Liters) 41.6 l/min   Duration of Treatment (minutes) 120 minutes   Hemodialysis Intake (ml) 1000 ml   Hemodialysis Output (ml) 1621 ml   NET Removed (ml) 621   Tolerated Treatment Poor   Interventions Taken Fluid bolus; Medication;Ultrafiltration goal decreased;Ultrafiltration stopped   Patient Response to Treatment Treatment stopped early per ICU nurse to allow for pt to be moved to ICU and intubated. Patient was not intubated.    Time Off 1626   Patient Disposition Remain in ICU/ED

## 2022-07-21 NOTE — PLAN OF CARE
received perfect serve from dr Demetris Urbano regarding doing a CTA today due to positive bleeding scan. Discussed pt with primary rn torsten, pt has had melena and burgandy stools today hgb 9.6 currently. D/w dr Raina Forman, he would like to do egd today prior to any CTA. Surgery notified, also informed surgery that dr Raina Forman is requesting anesthesia to come up and eval pt. This was relayed to primary rn and dr Demetris Urbano. Informed primary rn to notify pulmonary service of need for egd . Igor Buckner [facility], APRN - CNP

## 2022-07-21 NOTE — PROGRESS NOTES
From the standpoint  [address]    PROGRESS NOTE             7/21/2022    8:36 AM    Name:   Nat Alejandre  MRN:     [id number]     Acct:      [de-identified]   Room:   2009/2009-01  IP Day:  1  Admit Date:  7/19/2022 11:42 PM    PCP:  Barbara Bowling MD  Code Status:  Full Code    Subjective:     C/C:   Chief Complaint   Patient presents with    Melena     Interval History Status: significantly improved. Patient seen and examined at the bed side. Patient improved mentally and physically states she is better than yesterday. she is awake conversational fully oriented by 3 and alert. Patient gives history about her medical illness, and asked question about her stay. However, patient asked questions that cannot be understood completely [in terms of comprehension, patient's words are clear]. Nurse today reported patient still have black stool    Patient request that she can be seated, her lower back hurts and feels like sandpaper, she states she can sit with her son and 2 nurses. Patient reports being anxious about his current illness, and she wants to know where we are at at this point and asked about the nephrologist [kidney doctor]. Patient received 2 fresh frozen plasma and 4 blood transfusion since yesterday, most recently today [hemoglobin currently at 9.6]. Notes from nursing staff and Consults had been reviewed. Nurse says patient still has dark stools  Brief History:     The patient is a 66 y.o.  female, with a history of COPD, ESRD on dialysis MWF and A-fib. Patient has an extensive medical history and was transported to the ED from [address] [place] [address] after being in the SNF for only 1 day. She was admitted to [address] [place], [place] [address] 6/27-7/18 admitted for encephalopathy, hypoxia requiring mechanical ventilation and bradycardia requiring atropine and transcutaneous pacing.  She sustained JOY on top of Stage 4 CKD resulting in hyperkalemia and she is now dialysis dependent. During hospitalization she had multiple episodes of recurrent anemia requiring transfusion of PRBC. Despite recurrent anemia she was discharged to SNF on eliquis. She presented to the ED due to acute rectal bleeding. Daughter is at bedside and reports that when she went to the SNF to see her mother she noticed that the patient was lethargic and more confused. Patient has had dark stools from taking iron but when her brief was changed the aide reported a large amount of bright red blood mixed with stool. Her symptoms are acute and severe with a hemoglobin of 4.9. 2 units of PRBC were ordered in the ED and GI consulted. Symptoms continued to be associated with lethargy and mild confusion. Patient is able to state that she is in the hospital and responds to most questions appropriately in regards to her recent medical history. She does have a chronic dry cough from COPD. Daughter also reports patient having a decreased appetite and feels that patient has lost weight but is uncertain of amount. There are no reported aggravating or alleviating factors. Denies fever, chills, chest pain, abdominal pain, nausea, vomiting, diarrhea, and urinary symptoms. When confirming code status as Full code, her daughter stated that family has been waiting for patient's mentation to improve prior to further discussing her code status and goals of care. Patient has 4 adult children. Review of Systems:     CONSTITUTIONAL:  no fevers  Psych: Normal mood and affect  HEENT: No headaches, no difficulty swallowing  RESPIRATORY:+ Cough  CARDIOVASCULAR: negative for chest pain, no palpitations  GASTROINTESTINAL: no nausea, no vomiting, n dark black stool  GENITOURINARY: negative for dysuria  MUSCULOSKELETAL: Back pain lower bottom, pressure side   NEUROLOGICAL: Patient is weak      Medications: Allergies:     Allergies   Allergen Reactions    Latex Rash    Aleve [Naproxen Sodium]      Chronic kidney disease stage III, CHF    Pioglitazone Other (See Comments)     Congestive heart failure    Claritin [Loratadine]     Keflex [Cephalexin]     Lisinopril      Needs clarification of contraindication       Current Meds:   Scheduled Meds:    [Held by provider] pantoprazole (PROTONIX) 40 mg injection  40 mg IntraVENous Daily    budesonide-formoterol  2 puff Inhalation BID    hydrocortisone  25 mg Rectal BID    [Held by provider] QUEtiapine  25 mg Oral Nightly    [Held by provider] levothyroxine  25 mcg Oral Daily    [Held by provider] furosemide  80 mg Oral Daily    [Held by provider] atorvastatin  40 mg Oral Nightly    [Held by provider] amiodarone  100 mg Oral Daily    metoprolol  2.5 mg IntraVENous Q6H    sodium chloride flush  5-40 mL IntraVENous 2 times per day     Continuous Infusions:    pantoprazole 8 mg/hr (07/21/22 0614)    sodium chloride      sodium chloride      sodium chloride      sodium chloride      sodium chloride       PRN Meds: sodium chloride, albuterol sulfate HFA, sodium chloride flush, sodium chloride, ondansetron **OR** ondansetron, acetaminophen **OR** acetaminophen, sodium chloride, sodium chloride, sodium chloride, sodium chloride flush, sodium chloride flush    Data:     Past Medical History:   has a past medical history of Acute CVA (cerebrovascular accident) ([facility] [address] Acute on chronic combined systolic (congestive) and diastolic (congestive) heart failure ([address] Arthritis, Asthma, Bradycardia, ESRD (end stage renal disease) ([facility] [address] Essential hypertension, Hallucinations, Hard of hearing, Head contusion, Iron deficiency anemia, unspecified, Meningioma ([address] Myocardial infarction ([facility] [address] Pure hypercholesterolemia, Type 2 diabetes mellitus with stage 4 chronic kidney disease, without long-term current use of insulin ([facility] [address] Unspecified venous (peripheral) insufficiency, and Unspecified vitamin D deficiency.     Social History:   reports that she quit smoking about 22 years ago. She has a 2.50 pack-year smoking history. She has never used smokeless tobacco. She reports that she does not currently use alcohol. She reports that she does not use drugs. Family History:   Family History   Problem Relation Age of Onset    Diabetes Mother     Heart Disease Mother     Heart Disease Father     Diabetes Sister     High Blood Pressure Sister     Diabetes Maternal Grandmother        Vitals:  BP (!) 165/36   Pulse 96   Temp 98.5 °F (36.9 °C) (Oral)   Resp 20   Ht 4' 11\" (1.499 m)   Wt 144 lb 2.9 oz (65.4 kg)   SpO2 94%   BMI 29.12 kg/m²   Temp (24hrs), Av.7 °F (37.1 °C), Min:97.7 °F (36.5 °C), Max:99.8 °F (37.7 °C)      Recent Labs     22  1100 22  1154 22  1637   POCGLU 57* 100 96       I/O(24Hr): Intake/Output Summary (Last 24 hours) at 2022 0836  Last data filed at 2022 0701  Gross per 24 hour   Intake 2201.66 ml   Output 1721 ml   Net 480.66 ml           Lab Results   Component Value Date/Time    SPECIAL NOT GIVEN 2022 01:11 PM     Lab Results   Component Value Date/Time    CULTURE NO GROWTH <24 HRS 2022 06:38 PM         Radiology:    XR CHEST (SINGLE VIEW FRONTAL)    Result Date: 2022  EXAMINATION: ONE XRAY VIEW OF THE CHEST 2022 2:44 pm COMPARISON: Chest x-ray dated 10 July 2022 HISTORY: ORDERING SYSTEM PROVIDED HISTORY: screening TECHNOLOGIST PROVIDED HISTORY: screening FINDINGS: Stable right-sided dialysis catheter. Stable cardiomegaly with bilateral pleural effusions and bibasilar airspace opacities. Stable findings of CHF. NM GI BLOOD LOSS    Result Date: 2022  EXAMINATION: NUCLEAR MEDICINE GASTRIC BLEEDING STUDY 2022 TECHNIQUE: Following the intravenous injection of 22.5 mCi of 99mTc-labeled RBCs, a flow study and standard images of the abdomen was obtained over a total period of 60 minutes.  COMPARISON: Abdomen and pelvis CT 2020 HISTORY: ORDERING SYSTEM PROVIDED HISTORY: rectal bleeding TECHNOLOGIST PROVIDED HISTORY: rectal bleeding Reason for Exam: rectal bleeding FINDINGS: Activity within the blood pool, including the great vessels, heart, liver, and spleen. Accumulation of a small amount of activity in the urinary bladder over the course of the study. Abnormal bowel activity beginning by approximately 13 minutes, appearing to extend initially to left and slightly superiorly before extending to the right and inferiorly at the level of the aortic bifurcation. Active gastrointestinal bleeding during study acquisition. The source is not definitely localized with considerations including the 3rd or 4th segment of the duodenum or the mid to distal transverse colon. Consider further evaluation with CT angiography for better localization. RECOMMENDATIONS: If the patient shows hemodynamic signs of an active bleed in the next 20 hours, additional images can be acquired. XR CHEST PORTABLE    Result Date: 7/20/2022  EXAMINATION: ONE XRAY VIEW OF THE CHEST 7/20/2022 6:03 pm COMPARISON: Chest x-ray dated 20 July 2022, 1626 hours HISTORY: ORDERING SYSTEM PROVIDED HISTORY: central line place TECHNOLOGIST PROVIDED HISTORY: central line place Reason for Exam: Central line placement. Due to pt. condition, pt. unable to raise chin FINDINGS: Right-sided central venous catheter with the tip in the superior vena cava. There is a left-sided IJ catheter with the tip in the superior vena cava. Moderate right and small left pleural effusions. Bibasilar airspace opacities. No pneumothorax. Stable cardiomediastinal silhouette     1. Interval placement of a left-sided IJ catheter with the tip in the superior vena cava.  2.  Moderate right and small left pleural effusions with bibasilar airspace opacities     XR CHEST PORTABLE    Result Date: 7/20/2022  EXAMINATION: ONE XRAY VIEW OF THE CHEST 7/20/2022 4:15 pm COMPARISON: Chest x-ray dated 19 July 2022 HISTORY: ORDERING SYSTEM PROVIDED HISTORY: Follow up, respiratory distress TECHNOLOGIST PROVIDED HISTORY: Follow up, respiratory distress Reason for Exam: Follow up, respiratory distress FINDINGS: right-sided central venous catheter with the tip in the SVC. Moderate right pleural effusion with right basilar airspace opacities appear stable. No pneumothorax. Stable cardiomegaly     Stable exam with moderate right-sided pleural effusion with right basilar airspace disease. XR CHEST PORTABLE    Result Date: 7/20/2022  EXAMINATION: ONE XRAY VIEW OF THE CHEST 7/19/2022 11:57 pm COMPARISON: Prior studies including 06/30/2022 and 07/14/2022 HISTORY: ORDERING SYSTEM PROVIDED HISTORY: pain TECHNOLOGIST PROVIDED HISTORY: pain Reason for Exam: CP and melena X several days FINDINGS: Pulmonary edema has improved from 07/14/2022. There is a persistent moderate right pleural effusion with right basilar consolidation. The heart is enlarged and unchanged. Prior sternal splitting procedure. A right IJ tunneled dialysis catheter remains in good position. Pulmonary edema has improved. Persistent moderate-sized right pleural effusion with basilar consolidation. EKG:        Physical Examination:        PHYSICAL EXAM:  General Appearance  Alert*3 , awake  Psych: Anxious  HEENT - Head is normocephalic, atraumatic. Neck: supple, no rigidity,   Lungs - Bilateral equal air entry, decreased at lower lung fields. Crackles/wheeze at lower lung fields  Cardiovascular - Heart sounds are normal.  Irregular rhythm, normal rate without murmur, gallop or rub.   Abdomen - Soft, nontender, nondistended, no masses or organomegaly  Neurologic - There are no new focal motor or sensory deficits  Extremities -pale skin, no clubbing or edema. + nails changes       Assessment:        Primary Problem  Gastrointestinal hemorrhage    Active Hospital Problems    Diagnosis Date Noted    Gastrointestinal hemorrhage [K92.2] 07/20/2022     Priority: Medium    Dependence on renal dialysis ([facility] [address] [Z99.2]      Priority: Medium    Paroxysmal atrial fibrillation ([facility] [address] [I48.0] 07/28/2020    CKD stage 4 secondary to hypertension ([facility] [address] [I12.9, N18.4] 02/20/2020       Plan:        Lower GI bleed(Melena)  -GI consulted, general surgery consulted  Central line was inserted yesterday at t left internal jugular vein-Patient still has active GI bleeding, seen as dark stools. Patient was getting more hypotensive during dialysis and confused,  Intubated  - Patient significantly improved today  -Technetium scan was done, active GI bleeding can be evaluated. Cannot be determined whether at the start of the fourth part of duodenum versus transverse colon  - Today urine analysis had hemoglobin  -Patient presented with Hb 4.9. Received 4 pRBC for now. 2 fresh frozen plasma. [Hemoglobin now 9.6]  -IV Protonix bolus was given with continuous infusion  -Anticoagulation/Antiplatelets Held  --NPO     Atrial Fibrillation  -Patient had Atrial Fibrillation at ED  -Hold eliquis [due to bleeding]  -Change to metoprolol injection 2.5 Q6H(NPO)  -Hold Amiodarone [n.p.o.]     ESRD  -Nephrology on board  -Patient yesterday got confused more during dialysis, dialysis was stopped at that moment  -On Wednesday/Friday/Monday dialysis schedule. Hypothyroidism  -Hold Levothyroxine [patient n.p.o.]     DVT prophylaxis: N/A patient is bleeding(Eliqus held)  GI prophylaxis: Protonix 40 mg daily       Parag Miller MD  7/21/2022  8:36 AM      Attending Physician Statement  I have discussed the care of [address] [place] [address] Drive with the resident team. I have examined the patient myself and taken ros and hpi , including pertinent history and exam findings,  with the resident. I have reviewed the key elements of all parts of the encounter with the resident. I agree with the assessment, plan and orders as documented by the resident.     Principal Problem:    Gastrointestinal hemorrhage  Active Problems:    Dependence on renal dialysis ([facility] [address]    CKD stage 4 secondary to hypertension (HCC)    Paroxysmal atrial fibrillation ([facility] [address]  Resolved Problems:    * No resolved hospital problems.  *    Continues to have jing, s/p FFP yesterday  Hb 9.6 today  Hypotensive during dialysis yesterday  Tc 99 scan yesterday- active bleed noted but source inconclusive   CT angio recommended- will get an ok from nephro and order    Will need to c/w ICU status due to unstable hemoglobin  Pt is high risk due to GI bleed, and ESRD on HD      Electronically signed by Mai Reddy MD

## 2022-07-21 NOTE — PROGRESS NOTES
Patient downstairs getting EGD per GI. Central line functioning well per nursing staff. Continue medical management. Surgery on standby.

## 2022-07-21 NOTE — PROGRESS NOTES
Dr. Cutler Arrant rounding, orders received, keep patient ICU status, clear liquids tonight. Transfuse 1 unit PRBC's if hemoglobin less than 7.5.

## 2022-07-21 NOTE — CONSULTS
[address]                 [address] [facility], [address]                                  CONSULTATION    PATIENT NAME: Talisha Vasquez                   :        1950  MED REC NO:   [id number]                              ROOM:       2009  ACCOUNT NO:   [de-identified]                           ADMIT DATE: 2022  PROVIDER:     William Blancas    CONSULT DATE:  2022    REASON FOR CONSULTATION:  Evaluate the patient because of acute mental  status changes, need for intubation. HISTORY OF PRESENT ILLNESS:  The patient is a 78-year-old female. Reportedly, the patient at baseline is normally conversant and alert and  oriented. This is per the patient's daughter, Alisa Madrigal. The patient  presented to [facility] on 2022. The patient presented  with history of end-stage renal disease on dialysis Monday, Wednesday,  Friday; history of atrial fibrillation. The patient reportedly was  transferred to the ED from the [address] [place] [address] [address] after being at the  skilled nursing facility for 1 day. The patient was seen at [facility] on 2022 through 2022 admitted for  encephalopathy. The patient reportedly required atropine and  transcutaneous pacemaking for bradycardia history also requiring  mechanical ventilation. She reports having had acute kidney injury on  top of stage IV chronic kidney disease. She is dialysis dependent. She  was eventually transferred to the skilled nursing facility, reportedly  on Eliquis. She presented because of problems with acute rectal  bleeding. Reportedly, she had bright red blood mixed with stool. Hemoglobin was checked and was very low at 4.9. The patient also had  problems with lethargy and confusion. Of note, the patient was  reportedly undergoing dialysis when the patient's mental status  worsened.   I was called stat page concerning that the patient may  require immediate intubation of mechanical ventilation. Dr. Yeimy Musa from the ER was called to see the patient and felt the  patient did not need to be admitted as the patient was responding after  questions. The patient currently looks sleepy. She does open her eyes  to voice. She does nod her head closely, but then falls back asleep. This is what I saw during my evaluation with daughter and bedside nurse  present. PAST MEDICAL HISTORY:  History of acute CVA, history of systolic and  diastolic congestive heart failure, echo performed on 03/05/2022,  ejection fraction 60 to 65%. RV not well visualized. The patient also  has history of COPD, asthma, history of end-stage renal disease, history  of iron-deficiency anemia, history of meningioma in the past, history of  peripheral vascular insufficiency. ALLERGIES:  Include LATEX-rash, KEFLEX-unknown, LISINOPRIL-unknown,  ALEVE-chronic kidney disease, PIOGLITAZONE-congestive heart failure, and  CLARITIN. SOCIAL HISTORY:  Reportedly, she quit smoking 22 years ago. REVIEW OF SYSTEMS:  Not able to be obtained at this time. PHYSICAL EXAMINATION:  GENERAL:  The patient is a 79-year-old female resting quietly. She does  not look like she is in any distress. VITAL SIGNS:  Blood pressure 350 systolically, heart rate anywhere from  96 to 105, respirations 20 to 25, temperature 98.3, O2 saturations 98%  on room air. HEENT:  No supraclavicular lymph node enlargement. LUNGS:  Coarse throughout. No crackles, rales or wheezes. CARDIOVASCULAR:  Regular rhythm. ABDOMEN:  Soft. EXTREMITIES:  EPC cuffs. No signs of edema. LABORATORY FINDINGS:  BUN and creatinine 62/1.72. There was a white  count of 6.9 , there was a hemoglobin down to 4.9 earlier today, now  8.7. There was urinalysis, which revealed negative nitrite, moderate  leukocyte esterase. Arterial blood gas pH of 7.52, pCO2 33.6, pO2 68.7. IMPRESSION:  The patient with:  1. Altered mental status.   2. End-stage renal disease on hemodialysis. 3.  History of stroke. 4.  History of COPD.  5.  History of AFib. 6.  Recent hospitalization requiring mechanical ventilation. PLAN:  1. The patient is being followed through with hemoglobin at this time. 2.  The patient already on Symbicort. 3.  We will start the patient on DuoNeb treatments three times daily. 4.  EPC cuffs. 5.  Follow up for any signs of worsening decline. 6.  We will see if the patient tolerates dialysis tomorrow. There was a  chest x-ray performed, which revealed bilateral pleural effusions. I  believe there is a dialysis catheter in the subclavian area and there  seems to be essential central venous access placed in the left IJ. We  will follow the patient in-house. Thank you Dr. Karlee Vidal for the consult. Total time of 45 minutes critical care time was spent with the patient.         Alley Guardado    D: 07/20/2022 18:33:13       T: 07/20/2022 18:38:53     [email or web address]  Job#: [id number]     Doc#: [id number]    CC:

## 2022-07-21 NOTE — OP NOTE
ESOPHAGOGASTRODUODENOSCOPY   ( EGD )  DATE OF PROCEDURE: 7/21/2022     SURGEON: Monserrat Workman MD    ASSISTANT: None    PREOPERATIVE DIAGNOSIS: Acute GI bleeding. Procedure performed in an emergency basis. Patient has been on anticoagulation therapy. POSTOPERATIVE DIAGNOSIS: No signs of acute bleeding up to third part of the duodenum. No ulceration, erosions or inflammation seen. OPERATION: Upper GI endoscopy       ANESTHESIA: MAC    ESTIMATED BLOOD LOSS: None    COMPLICATIONS: None. SPECIMENS:  Was Not Obtained    HISTORY: The patient is a 66y.o. year old female with history of above preop diagnosis. I recommended esophagogastroduodenoscopy with possible biopsy and I explained the risk, benefits, expected outcome, and alternatives to the procedure. Risks included but are not limited to bleeding, infection, respiratory distress, hypotension, and perforation of the esophagus, stomach, or duodenum. Patient understands and is in agreement. PROCEDURE: The patient was given IV conscious sedation. The patient's SPO2 remained above 90% throughout the procedure. Cetacaine spray given. Patient placed in left lateral position. Olympus  videogastroscope was inserted orally under vision into the esophagus without difficulty and advanced into the stomach then through the pylorus up to the second part of duodenum. Findings:    Retropharyngeal area was grossly normal appearing    Esophagus: normal, may have small sliding hiatal hernia. Stomach:    Fundus and Cardia Examined in Retroflexed View: normal    Body: normal    Antrum: normal    Duodenum:     Descending: normal    Bulb: normal  Up to the visualized area no evidence of bleeding seen. No lesions noted that can account for patient's acute GI bleed. While withdrawing the scope the above findings were verified and the scope was removed. The patient has tolerated the procedure without unusual events.            F/U In Office as instructed  Discussed with the family  Source of bleeding may be either small bowel or colon. To follow patient closely and transfuse as needed. If patient continues to bleed, need to have further work-up and management.                    Electronically signed by Maddie Weiss MD  on 7/21/2022 at 2:04 PM

## 2022-07-21 NOTE — CONSULTS
GI Consult Note:    Name: Justice Hull  MRN: [id number]     Acct: [de-identified]  Room: 2009/2009-01    Admit Date: 7/19/2022  PCP: Maximo Obrien MD    Physician Requesting Consult: Katherne Sicard, MD     Reason for Consult: GI bleeding leading to shock    Chief Complaint:     Chief Complaint   Patient presents with    Melena       History Obtained From:     patient, electronic medical record, nursing staff, resident staff    History of Present Illness:      Justice Hull is a  66 y.o.  female who presents with Melena      Symptoms:  Onset:  Patient seen along with the GI APRN and nursing staff around 4 PM in ICU. At that time patient is on dialysis and getting stopped because of unstable patient status. It appears that patient was transferred from the nursing home with a history of dark stools last night to the emergency department, evaluated in the emergency room and got admitted. This morning she continued to have dark stools. GI consult was initiated. At the time of admission her hemoglobin was 4.9. She was given transfusion and hemoglobin improved. Little afternoon patient was doing reasonably well. However after starting dialysis patient became drowsy and lethargic. Subsequently at the time of my visit the dialysis getting stopped. Patient had ABGs done reviewed. Before my visit, call was made to the emergency physician to intubate this patient. However when I visited the patient, patient even though she was not lethargic arousable. On further discussion she denies abdominal pain. No nausea vomiting. She was not in respiratory distress. I did discuss with nursing staff and respiratory therapist regarding stable respiratory status at that time/4 PM.    This patient has chronic anemia. She had a EGD and a colonoscopy done by Dr. Wild Yates on 3/1/2022. EGD was reported to be normal, colonoscopy revealed diverticulosis.   It was felt that patient may need capsule study of the small bowel as an outpatient and that was not done so far. Looks like patient was in and out of the hospital.  In fact patient was discharged from [facility] on 7/18/2022 after almost 3 weeks of hospitalization with multiple medical issues, chart reviewed and noted. Patient stayed in the nursing home 1 day and got transferred on 7/19/2022 to [facility] emergency department. On further discussion with the nursing staff it appears that this patient in and out of the hospitals frequently. Patient is not able to give clear-cut history. I did review the admitting physician notes and noted the contents which are as follows. The patient is a 66 y.o.  female, with a history of COPD, ESRD on dialysis MWF and A-fib. Patient has an extensive medical history and was transported to the ED from [address] [place] [address] [address] after being in the SNF for only 1 day. She was admitted to [address] [place] [address] Dr. Mckeon [facility] 6/27-7/18 admitted for encephalopathy, hypoxia requiring mechanical ventilation and bradycardia requiring atropine and transcutaneous pacing. She sustained JOY on top of Stage 4 CKD resulting in hyperkalemia and she is now dialysis dependent. During hospitalization she had multiple episodes of recurrent anemia requiring transfusion of PRBC. Despite recurrent anemia she was discharged to SNF on eliquis. She presented to the ED due to acute rectal bleeding. Daughter is at bedside and reports that when she went to the SNF to see her mother she noticed that the patient was lethargic and more confused. Patient has had dark stools from taking iron but when her brief was changed the aide reported a large amount of bright red blood mixed with stool. Her symptoms are acute and severe with a hemoglobin of 4.9. 2 units of PRBC were ordered in the ED and GI consulted. Symptoms continued to be associated with lethargy and mild confusion.  Patient is able to state that she is in the hospital and responds to most questions appropriately in regards to her recent medical history. She does have a chronic dry cough from COPD. Daughter also reports patient having a decreased appetite and feels that patient has lost weight but is uncertain of amount. There are no reported aggravating or alleviating factors. Denies fever, chills, chest pain, abdominal pain, nausea, vomiting, diarrhea, and urinary symptoms. When confirming code status as Full code, her daughter stated that family has been waiting for patient's mentation to improve prior to further discussing her code status and goals of care. Patient has 4 adult children. PAST MEDICAL HISTORY   Patient  has a past medical history of Acute CVA (cerebrovascular accident) ([address] Acute on chronic combined systolic (congestive) and diastolic (congestive) heart failure (HCC), Arthritis, Asthma, Bradycardia, ESRD (end stage renal disease) ([address] Essential hypertension, Hallucinations, Hard of hearing, Head contusion, Iron deficiency anemia, unspecified, Meningioma ([address] Myocardial infarction ([address] Pure hypercholesterolemia, Type 2 diabetes mellitus with stage 4 chronic kidney disease, without long-term current use of insulin ([address] Unspecified venous (peripheral) insufficiency, and      Also reviewed GI APRN notes which is as follows. 66year old female with pmh of iron deficiency anemia, cva and afib on eliquis  dm copd chf esrd on hd who presented to the ED for rectal bleeding. Per the son the pt was recently admitted to [facility] for encephalopathy and hypoxia. She was discharged to a SNF and had an episode of bright red blood mixed with stool last night., FOBT positive She was admitted with hgb of 4.9, she did receive blood in the ICU and the repeat hgb is still pending. She has not had any BM or rectal bleeding since arriving to this facility.  Per the notes she has had recurrent anemia and has had multiple transfusions in the past. She was supposed to have an outpt capsule endoscopy done but this has not been arranged yet due to her hospitalizations. She does take ferrous sulfate and has had black stool in the past. She has had mild confusion and lethargy recently also. She has not had recent abdominal imaging.  Creat 2.86  no leucocytosis anemia profile from 3/1/22 shows iron at 20 with 5 % saturation inr 1.8  lft's are normal. She has not had fevers chills dysphagia abdominal pain rash, her family feels that she has lost weight     Endoscopy 3/1/22 egd (alycia) normal colonoscopy same day showed diverticulosis    Family reports reports sister with pancreatic cancer, another sister with colon cancer no liver or stomach cancer no uc/crohns  Social no smoking no etoh or illicit drugs    Past Medical History:     Past Medical History:   Diagnosis Date    Acute CVA (cerebrovascular accident) ([address] 07/25/2020    Acute on chronic combined systolic (congestive) and diastolic (congestive) heart failure ([address] 02/06/2022    Arthritis     Asthma     Bradycardia 06/12/2022    ESRD (end stage renal disease) ([address]     Essential hypertension 07/25/2020    Hallucinations 02/18/2021    Hard of hearing     Head contusion 09/09/2020    Iron deficiency anemia, unspecified     Meningioma ([address] 02/25/2021    Myocardial infarction ([address]     Pure hypercholesterolemia     Type 2 diabetes mellitus with stage 4 chronic kidney disease, without long-term current use of insulin (HCC)     Unspecified venous (peripheral) insufficiency     Unspecified vitamin D deficiency         Past Surgical History:     Past Surgical History:   Procedure Laterality Date    COLONOSCOPY N/A 3/15/2022    COLONOSCOPY DIAGNOSTIC performed by Johnathan Carnes MD at [facility]      x 3, Dr. Fausto Nyhan  3/7/2022         IR NONTUNNELED VASCULAR CATHETER  6/28/2022    IR NONTUNNELED VASCULAR CATHETER 6/28/2022 [facility]    IR TUNNELED CATHETER PLACEMENT GREATER THAN 5 YEARS  7/8/2022    IR TUNNELED CATHETER PLACEMENT GREATER THAN 5 YEARS 7/8/2022 [facility]    THORACENTESIS  3/10/2022         TONSILLECTOMY AND ADENOIDECTOMY      UPPER GASTROINTESTINAL ENDOSCOPY N/A 3/15/2022    EGD BIOPSY performed by Marta Disla MD at [facility] ENDO        Medications Prior to Admission:       Prior to Admission medications    Medication Sig Start Date End Date Taking? Authorizing Provider   QUEtiapine (SEROQUEL) 25 MG tablet Take 1 tablet by mouth nightly 7/18/22  Yes Nikolay GODOY Blood, DO   furosemide (LASIX) 80 MG tablet Take 1 tablet by mouth in the morning. 7/18/22  Yes Nikolay GODOY Blood, DO   ferrous sulfate (IRON 325) 325 (65 Fe) MG tablet Take 1 tablet by mouth in the morning and at bedtime 7/18/22  Yes Nikolay GODOY Blood, DO   ascorbic acid (VITAMIN C) 500 MG tablet Take 1 tablet by mouth in the morning and 1 tablet before bedtime.  7/18/22  Yes Nikolay GODOY Blood, DO   pantoprazole (PROTONIX) 40 MG tablet Take 1 tablet by mouth every morning (before breakfast) 7/19/22  Yes Nikolay GODOY Blood, DO   epoetin rick-epbx (RETACRIT) 80968 UNIT/ML SOLN injection Inject 0.5 mLs into the skin three times a week 7/18/22  Yes Brice GODOY Blood, DO   amiodarone (PACERONE) 100 MG tablet Take 1 tablet by mouth daily 7/12/22  Yes Tae Li MD   apixaban (ELIQUIS) 5 MG TABS tablet Take 1 tablet by mouth 2 times daily 7/11/22  Yes Tae Li MD   hydrocortisone (ANUSOL-HC) 25 MG suppository Place 1 suppository rectally 2 times daily 7/11/22  Yes Tae Li MD   polyethylene glycol (GLYCOLAX) 17 g packet Take 17 g by mouth 2 times daily 7/11/22 8/10/22 Yes Tae Li MD   levothyroxine (SYNTHROID) 25 MCG tablet Take 1 tablet by mouth Daily 7/12/22  Yes Tae Li MD   metoprolol tartrate (LOPRESSOR) 25 MG tablet Take 1 tablet by mouth 2 times daily 6/17/22  Yes Shila Martin MD   aspirin EC 81 MG EC tablet Take 1 tablet by mouth daily 6/17/22  Yes Jaki Wilson MD Janae   albuterol sulfate HFA (PROAIR HFA) 108 (90 Base) MCG/ACT inhaler Inhale 2 puffs into the lungs every 6 hours as needed for Wheezing or Shortness of Breath (cough) 6/17/22  Yes Shila Martin MD   fluticasone-salmeterol (ADVAIR HFA) 115-21 MCG/ACT inhaler Inhale 2 puffs into the lungs 2 times daily Maintenance inhaler 6/17/22  Yes Shila Martin MD   vitamin D (ERGOCALCIFEROL) 1.25 MG (39917 UT) CAPS capsule Take 1 capsule by mouth once a week Sundays 4/28/22  Yes Shila Martin MD   atorvastatin (LIPITOR) 40 MG tablet Take 1 tablet by mouth once daily 2/15/22  Yes Shila Martin MD   desloratadine (CLARINEX) 5 MG tablet Take 1 tablet by mouth once daily 2/15/22  Yes Shila Martin MD   magnesium oxide (MAG-OX) 400 (241.3 Mg) MG TABS tablet Take 1 tablet by mouth twice daily 2/15/22  Yes Shila Martin MD   miconazole (MICOTIN) 2 % powder Apply topically 2 times daily UNDER THE SKIN FOLDS LONG TERM 2/15/22  Yes Shila Martin MD   Multiple Vitamins-Minerals (THERAPEUTIC MULTIVITAMIN-MINERALS) tablet Take 1 tablet by mouth daily 2/15/22  Yes Shila Martin MD   blood glucose test strips (FREESTYLE LITE) strip 1 each by In Vitro route daily As needed. 3/19/21  Yes Shila Martin MD   blood glucose monitor kit and supplies 1 kit by Other route three times daily Dispense Butterfly Elite CBG Device 8/20/19  Yes Aracelis Diamond MD   amLODIPine (NORVASC) 10 MG tablet Take 1 tablet by mouth nightly 6/11/22 7/18/22  Viki Maurice MD   Blood Pressure KIT Diagnosis: HTN. Needs to check blood pressure 1-2 times a day until stable, then once a day. Goal blood pressure less than 135/85, and above 110/60. 5/26/22   Shila Martin MD   Nebulizers (COMPRESSOR/NEBULIZER) MISC Nebulizer with compressor. Disposable Med Nebs 2 /month. Reusable Med Nebs 1 per 6 months. Aerosol Mask 1 per month. Replacement Filters 2 per month.  All other related supplies as needed per month. Medications being used: Albuterol . 083 unit dose vial. Frequency: every 6 hrs x 4/day . Length of Need: 1  Patient not taking: No sig reported 22   Lisbeth Ospina MD   Misc. Devices (ADJUST FOLD CANE/YORK HANDLE) MISC Use daily for walking 22   Lisbeth Ospina MD   Handicap Placard MISC by Does not apply route Expires on 25   Lisbeth Ospina MD   FreeStyle Lancets MISC 1 each by Does not apply route daily Patient needs to contact office before any further refills will be approved 2/15/22   Brielle Sargent MD   Blood Pressure KIT 1 kit by Does not apply route three times daily  Patient not taking: No sig reported 19   Maria Ines Zheng MD        Allergies:       Latex, Aleve [naproxen sodium], Pioglitazone, Claritin [loratadine], Keflex [cephalexin], and Lisinopril    Social History:     Tobacco:    reports that she quit smoking about 22 years ago. She has a 2.50 pack-year smoking history. She has never used smokeless tobacco.  Alcohol:      reports that she does not currently use alcohol. Drug Use: reports no history of drug use. Family History:     Family History   Problem Relation Age of Onset    Diabetes Mother     Heart Disease Mother     Heart Disease Father     Diabetes Sister     High Blood Pressure Sister     Diabetes Maternal Grandmother        Review of Systems:     Review of Systems  Not able to discuss review of systems with the patient. However chart reviewed and noted available data regarding review of systems. Code Status:  Full Code    Physical Exam:     Vitals:  BP (!) 135/40   Pulse (!) 105   Temp 99 °F (37.2 °C) (Axillary)   Resp (!) 31   Wt 139 lb 1.8 oz (63.1 kg)   SpO2 98%   BMI 28.10 kg/m²   Temp (24hrs), Av.2 °F (36.8 °C), Min:97.5 °F (36.4 °C), Max:99 °F (37.2 °C)      Physical Exam  Constitutional:       Comments: Patient is drowsy lethargic, arousable. HENT:      Head:      Comments: Patient appears to be malnourished.      Nose: Comments: Mucosa is dry. Conjunctiva pale. Neck:      Comments: No JVD. Cardiovascular:      Comments: Distant heart sounds. Irregular heart rate. Pulmonary:      Comments: Lungs expands fairly. No wheezing. Scattered rhonchi. No rales. Abdominal:      General: Abdomen is flat. There is no distension. Palpations: Abdomen is soft. There is no mass. Tenderness: There is no abdominal tenderness. There is no guarding or rebound. Hernia: No hernia is present. Genitourinary:     Comments: Patient has burgundy color stool in the perianal area. Skin:     General: Skin is dry. Findings: Bruising present. Neurological:      Comments: Not able to examine neurologically. This patient has a history of CVA.      Data:   CBC:   Lab Results   Component Value Date/Time    WBC 6.9 07/20/2022 12:30 AM    RBC 1.59 07/20/2022 12:30 AM    HGB 6.8 07/20/2022 06:41 PM    HCT 21.3 07/20/2022 06:41 PM    MCV 94.8 07/20/2022 12:30 AM    MCH 30.5 07/20/2022 12:30 AM    MCHC 32.2 07/20/2022 12:30 AM    RDW 18.5 07/20/2022 12:30 AM     07/20/2022 12:30 AM    MPV 7.3 07/20/2022 12:30 AM     CBC with Differential:  Lab Results   Component Value Date/Time    WBC 6.9 07/20/2022 12:30 AM    RBC 1.59 07/20/2022 12:30 AM    HGB 6.8 07/20/2022 06:41 PM    HCT 21.3 07/20/2022 06:41 PM     07/20/2022 12:30 AM    MCV 94.8 07/20/2022 12:30 AM    MCH 30.5 07/20/2022 12:30 AM    MCHC 32.2 07/20/2022 12:30 AM    RDW 18.5 07/20/2022 12:30 AM    NRBC 2 07/20/2022 12:30 AM    LYMPHOPCT 11 07/20/2022 12:30 AM    MONOPCT 5 07/20/2022 12:30 AM    BASOPCT 0 07/20/2022 12:30 AM    MONOSABS 0.34 07/20/2022 12:30 AM    LYMPHSABS 0.76 07/20/2022 12:30 AM    EOSABS 0.21 07/20/2022 12:30 AM    BASOSABS 0.00 07/20/2022 12:30 AM    DIFFTYPE NOT REPORTED 02/06/2022 10:11 AM     Hemoglobin/Hematocrit:  Lab Results   Component Value Date/Time    HGB 6.8 07/20/2022 06:41 PM    HCT 21.3 07/20/2022 06:41 PM     CMP:    Lab Results Component Value Date/Time     07/20/2022 03:11 PM    K 3.7 07/20/2022 03:11 PM     07/20/2022 03:11 PM    CO2 27 07/20/2022 03:11 PM    BUN 62 07/20/2022 03:11 PM    CREATININE 1.72 07/20/2022 03:11 PM    GFRAA 35 07/20/2022 03:11 PM    LABGLOM 29 07/20/2022 03:11 PM    GLUCOSE 114 07/20/2022 03:11 PM    PROT 4.2 07/20/2022 12:30 AM    LABALBU 1.9 07/20/2022 12:30 AM    CALCIUM 8.6 07/20/2022 03:11 PM    BILITOT 0.60 07/20/2022 12:30 AM    ALKPHOS 93 07/20/2022 12:30 AM    AST 15 07/20/2022 12:30 AM    ALT 7 07/20/2022 12:30 AM     BMP:  Lab Results   Component Value Date/Time     07/20/2022 03:11 PM    K 3.7 07/20/2022 03:11 PM     07/20/2022 03:11 PM    CO2 27 07/20/2022 03:11 PM    BUN 62 07/20/2022 03:11 PM    LABALBU 1.9 07/20/2022 12:30 AM    CREATININE 1.72 07/20/2022 03:11 PM    CALCIUM 8.6 07/20/2022 03:11 PM    GFRAA 35 07/20/2022 03:11 PM    LABGLOM 29 07/20/2022 03:11 PM    GLUCOSE 114 07/20/2022 03:11 PM     PT/INR:    Lab Results   Component Value Date/Time    PROTIME 21.0 07/20/2022 12:30 AM    INR 1.8 07/20/2022 12:30 AM     PTT:    Lab Results   Component Value Date/Time    APTT 31.7 07/20/2022 12:30 AM   [APTT}    Assesment:     Primary Problem  Gastrointestinal hemorrhage    Active Hospital Problems    Diagnosis Date Noted    Gastrointestinal hemorrhage [K92.2] 07/20/2022     Priority: Medium    Dependence on renal dialysis [facility]) [Z99.2]      Priority: Medium    Paroxysmal atrial fibrillation ([facility] [I48.0] 07/28/2020    CKD stage 4 secondary to hypertension ([facility] [I12.9, N18.4] 02/20/2020       Plan: This patient had acute GI bleed leading to hypotension/shock. Patient became drowsy lethargic after starting dialysis which is stopped at present. As her conjunctivae is pale, we will give her another unit of transfusion. She had a normal EGD recently. Colonoscopy did reveal diverticulosis.   I have a strong feeling whether this patient has diverticular bleed. Also will give 2 units of fresh frozen plasma on stat basis. Given that patient is having acute bleeding, if her vital signs permits, will obtain nuclear bleeding scan. Discussed with the general surgery service as well. We will keep her on a PPI therapy. Given her multiple comorbidities, malnutrition, possible dehydration her prognosis is guarded. Discussed with the nursing staff regarding the care. Also advised stat CBC. Will follow the patient closely. Thank you for allowing me to participate in the care of your patient. Please feel free to contact me with anyquestions or concerns. Electronically signed by Kenya Juárez MD on 7/20/2022 at 8:19 PM     Copy sent to Dr. Mickey Essex, MD    Note is dictated utilizing voice recognition software. Unfortunately this leads to occasional typographical errors. Please contact our office if you have any questions.

## 2022-07-21 NOTE — PROGRESS NOTES
Patient transferred to Nuclear Medicine for bleeding scan with 2 RNs on monitor. Letitia Santacruz, primary RN, to stay with patient until scan is complete.

## 2022-07-21 NOTE — ANESTHESIA PRE PROCEDURE
Department of Anesthesiology  Preprocedure Note       Name:  Nikhil Peacock   Age:  66 y.o.  :  1950                                          MRN:  [id number]         Date:  2022      Surgeon: Redd Posada):  Cary Felipe MD    Procedure: Procedure(s):  EGD ESOPHAGOGASTRODUODENOSCOPY    Medications prior to admission:   Prior to Admission medications    Medication Sig Start Date End Date Taking? Authorizing Provider   QUEtiapine (SEROQUEL) 25 MG tablet Take 1 tablet by mouth nightly 22  Yes Nikolay GODOY Blood, DO   furosemide (LASIX) 80 MG tablet Take 1 tablet by mouth in the morning. 22  Yes Nikolay GODOY Blood, DO   ferrous sulfate (IRON 325) 325 (65 Fe) MG tablet Take 1 tablet by mouth in the morning and at bedtime 22  Yes Nikolay GODOY Blood, DO   ascorbic acid (VITAMIN C) 500 MG tablet Take 1 tablet by mouth in the morning and 1 tablet before bedtime.  22  Yes Nikolay GODOY Blood, DO   pantoprazole (PROTONIX) 40 MG tablet Take 1 tablet by mouth every morning (before breakfast) 22  Yes Nikolay GODOY Blood, DO   epoetin rick-epbx (RETACRIT) 03522 UNIT/ML SOLN injection Inject 0.5 mLs into the skin three times a week 22  Yes Brice GODOY Blood, DO   amiodarone (PACERONE) 100 MG tablet Take 1 tablet by mouth daily 22  Yes Tae Li MD   apixaban (ELIQUIS) 5 MG TABS tablet Take 1 tablet by mouth 2 times daily 22  Yes Tae Li MD   hydrocortisone (ANUSOL-HC) 25 MG suppository Place 1 suppository rectally 2 times daily 22  Yes Tae Li MD   polyethylene glycol (GLYCOLAX) 17 g packet Take 17 g by mouth 2 times daily 7/11/22 8/10/22 Yes Tae Li MD   levothyroxine (SYNTHROID) 25 MCG tablet Take 1 tablet by mouth Daily 22  Yes Tae Li MD   metoprolol tartrate (LOPRESSOR) 25 MG tablet Take 1 tablet by mouth 2 times daily 22  Yes Shila Martin MD   aspirin EC 81 MG EC tablet Take 1 tablet by mouth daily 22  Yes Shila Martin MD albuterol sulfate HFA (PROAIR HFA) 108 (90 Base) MCG/ACT inhaler Inhale 2 puffs into the lungs every 6 hours as needed for Wheezing or Shortness of Breath (cough) 6/17/22  Yes Josephine Rodriguez MD   fluticasone-salmeterol (ADVAIR HFA) 115-21 MCG/ACT inhaler Inhale 2 puffs into the lungs 2 times daily Maintenance inhaler 6/17/22  Yes Josephine Rodriguez MD   vitamin D (ERGOCALCIFEROL) 1.25 MG (16004 UT) CAPS capsule Take 1 capsule by mouth once a week Sundays 4/28/22  Yes Josephine Rodriguez MD   atorvastatin (LIPITOR) 40 MG tablet Take 1 tablet by mouth once daily 2/15/22  Yes Josephine Rodriguez MD   desloratadine (CLARINEX) 5 MG tablet Take 1 tablet by mouth once daily 2/15/22  Yes Josephine Rodriguez MD   magnesium oxide (MAG-OX) 400 (241.3 Mg) MG TABS tablet Take 1 tablet by mouth twice daily 2/15/22  Yes Josephine Rodriguez MD   miconazole (MICOTIN) 2 % powder Apply topically 2 times daily UNDER THE SKIN FOLDS LONG TERM 2/15/22  Yes Josephine Rodriguez MD   Multiple Vitamins-Minerals (THERAPEUTIC MULTIVITAMIN-MINERALS) tablet Take 1 tablet by mouth daily 2/15/22  Yes Josephine Rodriguez MD   blood glucose test strips (FREESTYLE LITE) strip 1 each by In Vitro route daily As needed. 3/19/21  Yes Josephine Rodriguez MD   blood glucose monitor kit and supplies 1 kit by Other route three times daily Dispense Butterfly Elite CBG Device 8/20/19  Yes Darryle Pancake, MD   amLODIPine (NORVASC) 10 MG tablet Take 1 tablet by mouth nightly 6/11/22 7/18/22  Luis Fernando Rhodes MD   Blood Pressure KIT Diagnosis: HTN. Needs to check blood pressure 1-2 times a day until stable, then once a day. Goal blood pressure less than 135/85, and above 110/60. 5/26/22   Josephine Rodriguez MD   Nebulizers (COMPRESSOR/NEBULIZER) MISC Nebulizer with compressor. Disposable Med Nebs 2 /month. Reusable Med Nebs 1 per 6 months. Aerosol Mask 1 per month. Replacement Filters 2 per month. All other related supplies as needed per month. Medications being used: Albuterol . 083 unit dose vial. Frequency: every 6 hrs x 4/day . Length of Need: 1  Patient not taking: No sig reported 2/22/22   Brain Shah MD   [facility] [place] [facility]  Devices (ADJUST FOLD CANE/YORK HANDLE) MISC Use daily for walking 2/22/22   Brain Shah MD   Handicap Placard MISC by Does not apply route Expires on 12/30/25 2/22/22   Brain Shah MD   FreeStyle Lancets MISC 1 each by Does not apply route daily Patient needs to contact office before any further refills will be approved 2/15/22   Teresa Simms MD   Blood Pressure KIT 1 kit by Does not apply route three times daily  Patient not taking: No sig reported 12/19/19   Belgica Dobbs MD       Current medications:    Current Facility-Administered Medications   Medication Dose Route Frequency Provider Last Rate Last Admin    tiotropium (SPIRIVA RESPIMAT) 2.5 MCG/ACT inhaler 2 puff  2 puff Inhalation Daily Mckenna Rodriguez MD   2 puff at 07/21/22 1101    pantoprazole (PROTONIX) 80 mg in sodium chloride 0.9 % 100 mL infusion  8 mg/hr IntraVENous Continuous Dulce Felton, DO 10 mL/hr at 07/21/22 0614 8 mg/hr at 07/21/22 7057    0.9 % sodium chloride infusion   IntraVENous PRN Dulcegisselle Felton, DO        [Held by provider] pantoprazole (PROTONIX) 40 mg in sodium chloride (PF) 10 mL injection  40 mg IntraVENous Daily Bj Felton,         albuterol sulfate HFA (PROVENTIL;VENTOLIN;PROAIR) 108 (90 Base) MCG/ACT inhaler 2 puff  2 puff Inhalation Q6H PRN Clista Stallion, APRN - NP        budesonide-formoterol (SYMBICORT) 80-4.5 MCG/ACT inhaler 2 puff  2 puff Inhalation BID Clista Stallion, APRN - NP   2 puff at 07/21/22 0730    hydrocortisone (ANUSOL-HC) suppository 25 mg  25 mg Rectal BID Clista Stallion, APRN - NP   25 mg at 07/21/22 0916    [Held by provider] QUEtiapine (SEROQUEL) tablet 25 mg  25 mg Oral Nightly MAL Steiner - NP        [Held by provider] levothyroxine (SYNTHROID) tablet 25 mcg  25 mcg Oral Daily MAL Montes De Oca - NP        [Held by provider] furosemide (LASIX) tablet 80 mg  80 mg Oral Daily MAL Steiner - NP        [Held by provider] atorvastatin (LIPITOR) tablet 40 mg  40 mg Oral Nightly MAL Montes De Oca - NP        [Held by provider] amiodarone (PACERONE) tablet 100 mg  100 mg Oral Daily Rudolph Cristobal APRN - NP        metoprolol (LOPRESSOR) injection 2.5 mg  2.5 mg IntraVENous Q6H Rudolph Cristobal APRN - NP   2.5 mg at 07/21/22 0533    sodium chloride flush 0.9 % injection 5-40 mL  5-40 mL IntraVENous 2 times per day Rudolph Cristobal APRN - NP   10 mL at 07/21/22 8462    sodium chloride flush 0.9 % injection 5-40 mL  5-40 mL IntraVENous PRN MAL Steiner - NP        0.9 % sodium chloride infusion   IntraVENous PRN Rudolph Cristobal APRN - NP        ondansetron (ZOFRAN-ODT) disintegrating tablet 4 mg  4 mg Oral Q8H PRN Rudolph Cristobal APRN - NP        Or    ondansetron (ZOFRAN) injection 4 mg  4 mg IntraVENous Q6H PRN Rudolph Cristobal APRN - NP        acetaminophen (TYLENOL) tablet 650 mg  650 mg Oral Q6H PRN Rudolph Cristobal APRN - NP   650 mg at 07/21/22 0537    Or    acetaminophen (TYLENOL) suppository 650 mg  650 mg Rectal Q6H PRN MAL Steiner - NP        0.9 % sodium chloride infusion   IntraVENous PRN Kenya Juárez MD        0.9 % sodium chloride infusion   IntraVENous PRN Kenya Juárez MD        0.9 % sodium chloride infusion   IntraVENous PRN Kenya Juárez MD        sodium chloride flush 0.9 % injection 10 mL  10 mL IntraVENous PRN Elzbieta Lefort, APRN - CNP        sodium chloride flush 0.9 % injection 10 mL  10 mL IntraVENous PRN Elzbieta Lefort, APRN - CNP   10 mL at 07/20/22 2040       Allergies:     Allergies   Allergen Reactions    Latex Rash    Aleve [Naproxen Sodium]      Chronic kidney disease stage III, CHF    Pioglitazone Other (See Comments)     Congestive heart failure    Claritin [Loratadine]     Keflex [Cephalexin] R79.89    Chronic ulcer of left leg with fat layer exposed ([facility] [address] L97.922    Hyperkalemia E87.5    Shock ([facility] R57.9    Acute respiratory failure with hypoxia and hypercapnia (HCC) J96.01, J96.02    Pulmonary edema cardiac cause (HCC) I50.1    Dependence on renal dialysis (HCC) Z99.2    Nephrotic range proteinuria R80.9    External hemorrhoid K64.4    Dysphagia R13.10    Debility R53.81    Sepsis (HCC) A41.9    Arterial hypotension I95.9    Gastrointestinal hemorrhage K92.2       Past Medical History:        Diagnosis Date    Acute CVA (cerebrovascular accident) ([facility] [address] 07/25/2020    Acute on chronic combined systolic (congestive) and diastolic (congestive) heart failure (HCC) 02/06/2022    Arthritis     Asthma     Bradycardia 06/12/2022    ESRD (end stage renal disease) ([facility] [address] [facility]     Essential hypertension 07/25/2020    Hallucinations 02/18/2021    Hard of hearing     Head contusion 09/09/2020    Iron deficiency anemia, unspecified     [facility]25[facility]    Myocardial infarction (HCC)     Pure hypercholesterolemia     Type 2 diabetes mellitus with stage 4 chronic kidney disease, without long-term current use of insulin (HCC)     Unspecified venous (peripheral) insufficiency     Unspecified vitamin D deficiency        Past Surgical History:        Procedure Laterality Date    COLONOSCOPY N/A 3/15/2022    COLONOSCOPY DIAGNOSTIC performed by Lidia Malcolm MD at [facility].      x 3, Dr. America Snowden  3/7/2022         IR NONTUNNELED VASCULAR CATHETER  6/28/2022    IR NONTUNNELED VASCULAR CATHETER 6/28/2022 [facility] Mcne[facility]    IR TUNNELED CATHETER PLACEMENT GREATER THAN 5 YEARS  7/8/2022    IR TUNNELED CATHETER PLACEMENT GREATER THAN 5 YEARS 7/8/2022 [facility]    THORACENTESIS  3/10/2022         TONSILLECTOMY AND ADENOIDECTOMY      UPPER GASTROINTESTINAL ENDOSCOPY N/A 3/15/2022    EGD BIOPSY performed by [facility]     07/20/22  1637   POCGLU 96       Coags:   Lab Results   Component Value Date/Time    PROTIME 17.6 07/21/2022 03:45 AM    INR 1.4 07/21/2022 03:45 AM    APTT 29.5 07/21/2022 03:45 AM       HCG (If Applicable): No results found for: PREGTESTUR, PREGSERUM, HCG, HCGQUANT     ABGs:   Lab Results   Component Value Date/Time    PHART 7.524 07/20/2022 04:08 PM    PO2ART 68.7 07/20/2022 04:08 PM    POL0DZX 33.6 07/20/2022 04:08 PM    WIL2ZSJ 27.7 07/20/2022 04:08 PM    G8JYCLVZ 90.5 07/20/2022 04:08 PM        Type & Screen (If Applicable):  No results found for: LABABO, LABRH    Drug/Infectious Status (If Applicable):  Lab Results   Component Value Date/Time    HEPCAB NONREACTIVE 06/28/2022 02:56 PM       COVID-19 Screening (If Applicable):   Lab Results   Component Value Date/Time    COVID19 Not Detected 06/27/2022 01:44 PM    COVID19 Not Detected 03/01/2022 10:34 PM           Anesthesia Evaluation  Patient summary reviewed and Nursing notes reviewed no history of anesthetic complications:   Airway: Mallampati: III  TM distance: >3 FB   Neck ROM: full  Mouth opening: > = 3 FB   Dental: normal exam         Pulmonary:normal exam  breath sounds clear to auscultation  (+) COPD: no interval change,  asthma: allergic asthma,     (-) pneumonia, shortness of breath, recent URI, sleep apnea, rhonchi, wheezes, rales, stridor, not a current smoker and no decreased breath sounds          Patient did not smoke on day of surgery.                  Cardiovascular:  Exercise tolerance: good (>4 METS),   (+) hypertension: no interval change, past MI: no interval change, CAD: no interval change, CHF: no interval change,     (-) pacemaker, valvular problems/murmurs, CABG/stent, dysrhythmias,  angina, orthopnea, PND,  FLORES, murmur, weak pulses,  friction rub, systolic click, carotid bruit,  JVD, peripheral edema, no pulmonary hypertension and no hyperlipidemia    ECG reviewed  Rhythm: regular  Rate: normal  Echocardiogram reviewed         Beta Blocker:  Dose within 24 Hrs         Neuro/Psych:   (+) CVA: no interval change,    (-) seizures, neuromuscular disease, TIA, headaches, psychiatric history and depression/anxiety            GI/Hepatic/Renal: Neg GI/Hepatic/Renal ROS       (-) hiatal hernia, GERD, PUD, hepatitis, liver disease, no renal disease, bowel prep and no morbid obesity       Endo/Other:    (+) DiabetesType II DM, no interval change, , no malignancy/cancer. (-) hypothyroidism, hyperthyroidism, blood dyscrasia, arthritis, no electrolyte abnormalities, no malignancy/cancer               Abdominal:             Vascular: negative vascular ROS. - PVD, DVT and PE. Other Findings:           Anesthesia Plan      general     ASA 4 - emergent       Induction: intravenous. MIPS: Postoperative opioids intended and Prophylactic antiemetics administered. Anesthetic plan and risks discussed with patient. Plan discussed with CRNA.                     Shanda Curtis MD   7/21/2022

## 2022-07-22 ENCOUNTER — APPOINTMENT (OUTPATIENT)
Dept: CT IMAGING | Age: 72
DRG: 377 | End: 2022-07-22
Payer: OTHER GOVERNMENT

## 2022-07-22 PROBLEM — E43 SEVERE MALNUTRITION (HCC): Chronic | Status: ACTIVE | Noted: 2022-07-22

## 2022-07-22 LAB
ANION GAP SERPL CALCULATED.3IONS-SCNC: 7 MMOL/L (ref 9–17)
BLD PROD TYP BPU: NORMAL
BLD PROD TYP BPU: NORMAL
BLOOD BANK BLOOD PRODUCT EXPIRATION DATE: NORMAL
BLOOD BANK BLOOD PRODUCT EXPIRATION DATE: NORMAL
BLOOD BANK ISBT PRODUCT BLOOD TYPE: 600
BLOOD BANK ISBT PRODUCT BLOOD TYPE: 600
BLOOD BANK PRODUCT CODE: NORMAL
BLOOD BANK PRODUCT CODE: NORMAL
BLOOD BANK UNIT TYPE AND RH: NORMAL
BLOOD BANK UNIT TYPE AND RH: NORMAL
BPU ID: NORMAL
BPU ID: NORMAL
BUN BLDV-MCNC: 24 MG/DL (ref 8–23)
CALCIUM SERPL-MCNC: 7.7 MG/DL (ref 8.6–10.4)
CHLORIDE BLD-SCNC: 103 MMOL/L (ref 98–107)
CO2: 29 MMOL/L (ref 20–31)
CREAT SERPL-MCNC: 1.25 MG/DL (ref 0.5–0.9)
CULTURE: ABNORMAL
DISPENSE STATUS BLOOD BANK: NORMAL
DISPENSE STATUS BLOOD BANK: NORMAL
GFR AFRICAN AMERICAN: 51 ML/MIN
GFR NON-AFRICAN AMERICAN: 42 ML/MIN
GFR SERPL CREATININE-BSD FRML MDRD: ABNORMAL ML/MIN/{1.73_M2}
GLUCOSE BLD-MCNC: 76 MG/DL (ref 70–99)
HCT VFR BLD CALC: 25 % (ref 36–46)
HCT VFR BLD CALC: 25 % (ref 36–46)
HEMOGLOBIN: 8.2 G/DL (ref 12–16)
HEMOGLOBIN: 8.4 G/DL (ref 12–16)
MAGNESIUM: 1.6 MG/DL (ref 1.6–2.6)
POTASSIUM SERPL-SCNC: 2.9 MMOL/L (ref 3.7–5.3)
SODIUM BLD-SCNC: 139 MMOL/L (ref 135–144)
SPECIMEN DESCRIPTION: ABNORMAL
TRANSFUSION STATUS: NORMAL
TRANSFUSION STATUS: NORMAL
UNIT DIVISION: 0
UNIT DIVISION: 0
UNIT ISSUE DATE/TIME: NORMAL
UNIT ISSUE DATE/TIME: NORMAL

## 2022-07-22 PROCEDURE — 90935 HEMODIALYSIS ONE EVALUATION: CPT

## 2022-07-22 PROCEDURE — 83735 ASSAY OF MAGNESIUM: CPT

## 2022-07-22 PROCEDURE — 99232 SBSQ HOSP IP/OBS MODERATE 35: CPT | Performed by: INTERNAL MEDICINE

## 2022-07-22 PROCEDURE — C9113 INJ PANTOPRAZOLE SODIUM, VIA: HCPCS | Performed by: INTERNAL MEDICINE

## 2022-07-22 PROCEDURE — 6360000002 HC RX W HCPCS: Performed by: NURSE PRACTITIONER

## 2022-07-22 PROCEDURE — 6360000002 HC RX W HCPCS: Performed by: INTERNAL MEDICINE

## 2022-07-22 PROCEDURE — 85014 HEMATOCRIT: CPT

## 2022-07-22 PROCEDURE — 2580000003 HC RX 258

## 2022-07-22 PROCEDURE — 2500000003 HC RX 250 WO HCPCS: Performed by: INTERNAL MEDICINE

## 2022-07-22 PROCEDURE — 6370000000 HC RX 637 (ALT 250 FOR IP): Performed by: INTERNAL MEDICINE

## 2022-07-22 PROCEDURE — 2580000003 HC RX 258: Performed by: INTERNAL MEDICINE

## 2022-07-22 PROCEDURE — 97166 OT EVAL MOD COMPLEX 45 MIN: CPT

## 2022-07-22 PROCEDURE — 97162 PT EVAL MOD COMPLEX 30 MIN: CPT

## 2022-07-22 PROCEDURE — 80048 BASIC METABOLIC PNL TOTAL CA: CPT

## 2022-07-22 PROCEDURE — 6360000004 HC RX CONTRAST MEDICATION

## 2022-07-22 PROCEDURE — 2060000000 HC ICU INTERMEDIATE R&B

## 2022-07-22 PROCEDURE — 2700000000 HC OXYGEN THERAPY PER DAY

## 2022-07-22 PROCEDURE — 94761 N-INVAS EAR/PLS OXIMETRY MLT: CPT

## 2022-07-22 PROCEDURE — 97530 THERAPEUTIC ACTIVITIES: CPT

## 2022-07-22 PROCEDURE — 74174 CTA ABD&PLVS W/CONTRAST: CPT

## 2022-07-22 PROCEDURE — 85018 HEMOGLOBIN: CPT

## 2022-07-22 PROCEDURE — 99233 SBSQ HOSP IP/OBS HIGH 50: CPT | Performed by: INTERNAL MEDICINE

## 2022-07-22 PROCEDURE — 36415 COLL VENOUS BLD VENIPUNCTURE: CPT

## 2022-07-22 PROCEDURE — 94640 AIRWAY INHALATION TREATMENT: CPT

## 2022-07-22 PROCEDURE — APPSS30 APP SPLIT SHARED TIME 16-30 MINUTES: Performed by: NURSE PRACTITIONER

## 2022-07-22 RX ORDER — CIPROFLOXACIN 2 MG/ML
400 INJECTION, SOLUTION INTRAVENOUS EVERY 24 HOURS
Status: DISCONTINUED | OUTPATIENT
Start: 2022-07-23 | End: 2022-07-26 | Stop reason: HOSPADM

## 2022-07-22 RX ORDER — 0.9 % SODIUM CHLORIDE 0.9 %
80 INTRAVENOUS SOLUTION INTRAVENOUS ONCE
Status: COMPLETED | OUTPATIENT
Start: 2022-07-22 | End: 2022-07-22

## 2022-07-22 RX ORDER — CIPROFLOXACIN 2 MG/ML
400 INJECTION, SOLUTION INTRAVENOUS EVERY 12 HOURS
Status: DISCONTINUED | OUTPATIENT
Start: 2022-07-22 | End: 2022-07-22

## 2022-07-22 RX ORDER — POTASSIUM CHLORIDE 7.45 MG/ML
10 INJECTION INTRAVENOUS PRN
Status: DISCONTINUED | OUTPATIENT
Start: 2022-07-22 | End: 2022-07-22

## 2022-07-22 RX ORDER — SODIUM CHLORIDE 0.9 % (FLUSH) 0.9 %
10 SYRINGE (ML) INJECTION PRN
Status: DISCONTINUED | OUTPATIENT
Start: 2022-07-22 | End: 2022-07-26 | Stop reason: HOSPADM

## 2022-07-22 RX ADMIN — METOPROLOL TARTRATE 2.5 MG: 5 INJECTION, SOLUTION INTRAVENOUS at 06:31

## 2022-07-22 RX ADMIN — POTASSIUM CHLORIDE 10 MEQ: 7.46 INJECTION, SOLUTION INTRAVENOUS at 09:37

## 2022-07-22 RX ADMIN — METOPROLOL TARTRATE 2.5 MG: 5 INJECTION, SOLUTION INTRAVENOUS at 22:09

## 2022-07-22 RX ADMIN — SODIUM CHLORIDE, PRESERVATIVE FREE 10 ML: 5 INJECTION INTRAVENOUS at 09:33

## 2022-07-22 RX ADMIN — ACETAMINOPHEN 325MG 650 MG: 325 TABLET ORAL at 19:35

## 2022-07-22 RX ADMIN — Medication 2 PUFF: at 06:50

## 2022-07-22 RX ADMIN — Medication 3 ML: at 17:25

## 2022-07-22 RX ADMIN — POTASSIUM CHLORIDE 10 MEQ: 7.46 INJECTION, SOLUTION INTRAVENOUS at 11:20

## 2022-07-22 RX ADMIN — POTASSIUM CHLORIDE 10 MEQ: 7.46 INJECTION, SOLUTION INTRAVENOUS at 12:20

## 2022-07-22 RX ADMIN — SODIUM CHLORIDE 8 MG/HR: 9 INJECTION, SOLUTION INTRAVENOUS at 02:44

## 2022-07-22 RX ADMIN — TIOTROPIUM BROMIDE INHALATION SPRAY 2 PUFF: 3.12 SPRAY, METERED RESPIRATORY (INHALATION) at 06:50

## 2022-07-22 RX ADMIN — SODIUM CHLORIDE 8 MG/HR: 9 INJECTION, SOLUTION INTRAVENOUS at 14:30

## 2022-07-22 RX ADMIN — METOPROLOL TARTRATE 2.5 MG: 5 INJECTION, SOLUTION INTRAVENOUS at 12:17

## 2022-07-22 RX ADMIN — SODIUM CHLORIDE, PRESERVATIVE FREE 10 ML: 5 INJECTION INTRAVENOUS at 08:42

## 2022-07-22 RX ADMIN — POTASSIUM CHLORIDE 10 MEQ: 7.46 INJECTION, SOLUTION INTRAVENOUS at 06:46

## 2022-07-22 RX ADMIN — IOPAMIDOL 100 ML: 755 INJECTION, SOLUTION INTRAVENOUS at 09:32

## 2022-07-22 RX ADMIN — SODIUM CHLORIDE 80 ML: 9 INJECTION, SOLUTION INTRAVENOUS at 09:34

## 2022-07-22 RX ADMIN — Medication 2 PUFF: at 19:37

## 2022-07-22 RX ADMIN — METOPROLOL TARTRATE 2.5 MG: 5 INJECTION, SOLUTION INTRAVENOUS at 00:00

## 2022-07-22 RX ADMIN — SODIUM CHLORIDE, PRESERVATIVE FREE 10 ML: 5 INJECTION INTRAVENOUS at 22:09

## 2022-07-22 ASSESSMENT — PAIN SCALES - GENERAL: PAINLEVEL_OUTOF10: 4

## 2022-07-22 NOTE — PROGRESS NOTES
Department of Internal Medicine  Nephrology [facility], MD   Consult Note    Reason for consultation: Management of hemodialysis dependent new onset end-stage renal disease. Consulting physician: Amrik Siegel MD.      Subjective/interval history. Patient seen and examined patient is doing better more awake and alert, patient is not in acute acute respiratory distress. Hb 8.4  EGD yesterday, No source of bleeding found  Patient had dialysis yesterday     History of present illness: This is a 66 y.o. female with a significant past medical history of Cerebrovascular accident, combined diastolic and systolic heart failure, essential hypertension, partial deafness and chronic kidney disease stage IV [associated with nephrotic range proteinuria [16 g/g], who was admitted to [facility] from 6/27/22 to 7/18/2022 for treatment of hypoxia and bradycardia. She required endotracheal intubation and was extubated on 7/6/2022. Acute hemodialysis was started during that admission for management of worsening azotemia associated with edema and tunneled hemodialysis catheter was eventually placed on 7/8/2022. Patient was on a TTS hemodialysis schedule but was sent to the [facility] where she was supposed to be starting a MWF hemodialysis schedule. Within 24 hours of getting to the [facility], she developed black tarry stools and was sent to the hospital and admitted for GI bleed with hemoglobin 4.9 g/dL yesterday. She has remained relatively hemodynamically stable with systolic blood pressure greater than 100 mmHg. She is completing the first of 2 scheduled units of packed red blood transfusions and is due to be evaluated by GI service. She is hard of hearing but alert and oriented.     Latex, Aleve [naproxen sodium], Pioglitazone, Claritin [loratadine], Keflex [cephalexin], and Lisinopril    Past Medical History:   Diagnosis Date    Acute CVA (cerebrovascular accident) ([facility] [address] 07/25/2020    Acute on chronic Grandmother         Social History     Socioeconomic History    Marital status:      Spouse name: None    Number of children: 6    Years of education: None    Highest education level: None   Tobacco Use    Smoking status: Former     Packs/day: 0.25     Years: 10.00     Pack years: 2.50     Types: Cigarettes     Quit date: 2000     Years since quittin.5    Smokeless tobacco: Never   Vaping Use    Vaping Use: Never used   Substance and Sexual Activity    Alcohol use: Not Currently     Alcohol/week: 0.0 standard drinks    Drug use: No    Sexual activity: Yes     Partners: Female     Review of systems: CNS - no headache or dizziness; Cardiac - no chest pain; Respiratory - no shortness of breath; Gastrointestinal - No nausea or vomiting but she has black tarry stools; Musculoskeletal - general body aches; Skin/Integument - no rashes. Physical Exam:    VITALS:  BP (!) 137/29   Pulse 76   Temp 97.6 °F (36.4 °C) (Oral)   Resp 18   Ht 4' 11\" (1.499 m)   Wt 138 lb 14.2 oz (63 kg)   SpO2 100%   BMI 28.05 kg/m²   TEMPERATURE:  Current - Temp: 97.6 °F (36.4 °C);  Max - Temp  Av.5 °F (36.9 °C)  Min: 96.8 °F (36 °C)  Max: 99.6 °F (37.6 °C)  RESPIRATIONS RANGE: Resp  Av  Min: 16  Max: 32  PULSE RANGE: Pulse  Av.7  Min: 69  Max: 117  BLOOD PRESSURE RANGE:  Systolic (56SML), ZYR:115 , Min:83 , YFI:197 ; Diastolic (74LRD), TLD:90, Min:29, Max:91  PULSE OXIMETRY RANGE: SpO2  Av.3 %  Min: 84 %  Max: 100 %  24HR INTAKE/OUTPUT:    Intake/Output Summary (Last 24 hours) at 2022 0959  Last data filed at 2022 0745  Gross per 24 hour   Intake 915.85 ml   Output 3500 ml   Net -2584.15 ml     Constitutional: alert, appears stated age, and cooperative    Skin: Skin color, texture, turgor normal. No rashes or lesions    Head: Normocephalic, without obvious abnormality, atraumatic     Cardiovascular/Edema: regular rate and rhythm, S1, S2 normal, no murmur, click, rub or gallop    Respiratory: Lungs: clear to auscultation bilaterally    Abdomen: soft, non-tender; bowel sounds normal; no masses,  no organomegaly    Back: symmetric, no curvature. ROM normal. No CVA tenderness. Extremities: extremities normal, atraumatic, no cyanosis or edema    Neuro:  Grossly normal      CBC:   Recent Labs     07/20/22  0030 07/20/22  1511 07/21/22  1723 07/21/22  2210 07/22/22  0437   WBC 6.9  --   --   --   --    HGB 4.9*   < > 10.1* 9.1* 8.4*     --   --   --   --     < > = values in this interval not displayed. BMP:    Recent Labs     07/20/22  1511 07/21/22  0345 07/22/22  0437    143 139   K 3.7 4.0 2.9*    105 103   CO2 27 27 29   BUN 62* 62* 24*   CREATININE 1.72* 2.08* 1.25*   GLUCOSE 114* 114* 76       Lab Results   Component Value Date/Time    NITRU NEGATIVE 07/20/2022 06:00 PM    COLORU Dark Yellow 07/20/2022 06:00 PM    PHUR 5.5 07/20/2022 06:00 PM    WBCUA 21 TO 50 07/20/2022 06:00 PM    RBCUA 51  07/20/2022 06:00 PM    MUCUS 1+ 06/27/2022 06:35 PM    TRICHOMONAS NOT REPORTED 02/07/2022 02:42 AM    YEAST NOT REPORTED 02/07/2022 02:42 AM    BACTERIA MANY 07/20/2022 06:00 PM    SPECGRAV 1.016 07/20/2022 06:00 PM    LEUKOCYTESUR MOD 07/20/2022 06:00 PM    UROBILINOGEN Normal 07/20/2022 06:00 PM    BILIRUBINUR NEGATIVE 07/20/2022 06:00 PM    GLUCOSEU TRACE 07/20/2022 06:00 PM    KETUA TRACE 07/20/2022 06:00 PM    AMORPHOUS 1+ 07/20/2022 06:00 PM     Urine Sodium:     Lab Results   Component Value Date/Time    SLOAN <20 06/08/2022 09:52 PM     Urine Protein:     Lab Results   Component Value Date/Time     10/27/2020 04:35 PM     Urine Creatinine:     Lab Results   Component Value Date/Time    LABCREA 63.0 06/08/2022 09:52 PM     IMPRESSION/RECOMMENDATIONS:      1.  End-stage renal disease - Secondary to diabetic nephropathy. We will place patient on a Monday/Wednesday/Friday hemodialysis schedule.   Patient had dialysis yesterday she will receive acute hemodialysis today as per schedule. MWF    2.  GI bleed -patient scheduled for EGD    3. Systemic hypertension - blood pressure control is adequate. 4.  Severe anemia secondary to GI bleed. Patient however has underlying anemia of chronic kidney disease. Hypokalemia- replace kcl    Plan  Hemodialysis today  Patient is scheduled for CT Abdomen with IV contrast  D/w family regarding risks and benefits of IV contrast including contrast induced Nephropathy. Family understands risk and benefits of contrast and would like to proceed . HD today after CT iv contrast.    Prognosis is guarded. Thank you very much for the courtesy and confidence of this consultation.     Selwyn Murphy MD   Attending Nephrologist  7/22/2022 9:59 AM

## 2022-07-22 NOTE — PROGRESS NOTES
[facility]    Gastroenterology Daily Progress Note      Patient:   Carly Haro   :    [birth year]   [facility]ility:   [facility]  Date:     2022  Consultant:   Carlene Lefort, APRN - CNP, CNP      SUBJECTIVE  66 y.o. female admitted 2022 with Gastrointestinal hemorrhage [K92.2]  Gastrointestinal hemorrhage, unspecified gastrointestinal hemorrhage type [K92.2]  Anemia, unspecified type [D64.9] and seen for anemia with rectal bleeding. The pt was seen and examined. Hgb 8.4. she is s/p egd yesterday that did not show any obvious source of bleeding. She had two  small episodes of melena overnight. No c/o abdominal pain or nausea. She is getting CTA this morning.          OBJECTIVE  Scheduled Meds:   [START ON 2022] ciprofloxacin  400 mg IntraVENous Q24H    tiotropium  2 puff Inhalation Daily    [Held by provider] pantoprazole (PROTONIX) 40 mg injection  40 mg IntraVENous Daily    budesonide-formoterol  2 puff Inhalation BID    hydrocortisone  25 mg Rectal BID    [Held by provider] QUEtiapine  25 mg Oral Nightly    [Held by provider] levothyroxine  25 mcg Oral Daily    [Held by provider] furosemide  80 mg Oral Daily    [Held by provider] atorvastatin  40 mg Oral Nightly    [Held by provider] amiodarone  100 mg Oral Daily    metoprolol  2.5 mg IntraVENous Q6H    sodium chloride flush  5-40 mL IntraVENous 2 times per day       Vital Signs:  BP (!) 141/42   Pulse 69   Temp 97.6 °F (36.4 °C) (Oral)   Resp 19   Ht 4' 11\" (1.499 m)   Wt 138 lb 14.2 oz (63 kg)   SpO2 100%   BMI 28.05 kg/m²      Physical Exam:     General Appearance: alert and oriented to person, place and time, well-developed and well-nourished, in no acute distress  Skin: warm and dry, no rash or erythema  Head: normocephalic and atraumatic  Eyes: pupils equal, round, and reactive to light, extraocular eye movements intact, conjunctivae normal  ENT: hearing grossly normal bilaterally  Neck: neck supple and non tender without mass, no thyromegaly or thyroid nodules, no cervical lymphadenopathy   Pulmonary/Chest: clear to auscultation bilaterally- no wheezes, rales or rhonchi, normal air movement, no respiratory distress  Cardiovascular: normal rate, regular rhythm, normal S1 and S2, no murmurs, rubs, clicks or gallops, distal pulses intact, no carotid bruits  Abdomen: soft, obese non-tender, non-distended, normal bowel sounds, no masses or organomegaly  Extremities: no cyanosis, clubbing or edema  Musculoskeletal: normal range of motion, no joint swelling, deformity or tenderness  Neurologic: no cranial nerve deficit and muscle strength normal    Lab and Imaging Review     CBC  Recent Labs     07/20/22  0030 07/20/22  1511 07/21/22  1723 07/21/22  2210 07/22/22  0437   WBC 6.9  --   --   --   --    HGB 4.9*   < > 10.1* 9.1* 8.4*   HCT 15.1*   < > 29.5* 27.3* 25.0*   MCV 94.8  --   --   --   --      --   --   --   --     < > = values in this interval not displayed. BMP  Recent Labs     07/20/22  1511 07/21/22  0345 07/22/22  0437    143 139   K 3.7 4.0 2.9*    105 103   CO2 27 27 29   BUN 62* 62* 24*   CREATININE 1.72* 2.08* 1.25*   GLUCOSE 114* 114* 76   CALCIUM 8.6 8.4* 7.7*       LFTS  Recent Labs     07/20/22  0030   ALKPHOS 93   ALT 7   AST 15   PROT 4.2*   BILITOT 0.60   LABALBU 1.9*       AMYLASE/LIPASE/AMMONIA  Recent Labs     07/20/22  0030   LIPASE 18       PT/INR  Recent Labs     07/20/22  0030 07/21/22  0345   PROTIME 21.0* 17.6*   INR 1.8 1.4         ANEMIA STUDIES  Recent Labs     07/20/22  1511 07/21/22  0345   LABIRON  --  44   TIBC  --  181*   [id number]  --    FOLATE 14.5  --      Findings:egd dr Evetta Aschoff 7/21/22     Retropharyngeal area was grossly normal appearing     Esophagus: normal, may have small sliding hiatal hernia.      Stomach:    Fundus and Cardia Examined in Retroflexed View: normal    Body: normal    Antrum: normal     Duodenum:    Descending: normal    Bulb: normal  Up to the visualized area no evidence of bleeding seen. No lesions noted that can account for patient's acute GI bleed. FINDINGS:nm scan 7/20/22   Activity within the blood pool, including the great vessels, heart, liver,   and spleen. Accumulation of a small amount of activity in the urinary   bladder over the course of the study. Abnormal bowel activity beginning by   approximately 13 minutes, appearing to extend initially to left and slightly   superiorly before extending to the right and inferiorly at the level of the   aortic bifurcation. Impression   Active gastrointestinal bleeding during study acquisition. The source is not   definitely localized with considerations including the 3rd or 4th segment of   the duodenum or the mid to distal transverse colon. Consider further   evaluation with CT angiography for better localization. ASSESSMENT/plan  Anemia with melena and rectal bleeding, s/p egd yesterday that did not show any obvious bleeding, she does have a positive NM bleeding scan  -going for CTA this am  -continue ppi drip  -trend hh and keep hgb >7  AC is currently on hold    2. ESRD on hd    3. UTI        Time spent reviewing chart assessing patient and discussing with attending md dyson 30 minutes      This plan was formulated in collaboration with Dr. Reta Sandhu. Electronically signed by: MAL Goodwin CNP on 7/22/2022 at 10:14 AM        GI attending physician note. Patient seen with APRN at about 11 AM in ICU. I independently performed an evaluation on [facility]. I have reviewed the above documentation completed by the Nurse Practitioner and agree with the history, examination, and management plan. Recommendations discussed. Patient looks stable. Clinically she looks much better. However she is having intermittent burgundy colored stools. Patient had CT angiogram, reviewed, no signs of active bleeding. Liquid diet started.     Not clear whether she has diverticular bleed. Patient had EGD colonoscopy done in March 2022. Reports reviewed. Supposed to have capsule study as an outpatient unfortunately prior to that study patient got admitted with the bleeding.

## 2022-07-22 NOTE — PROGRESS NOTES
Comprehensive Nutrition Assessment    Type and Reason for Visit:  Initial    Nutrition Recommendations/Plan:   Continue diet as ordered. Malnutrition Assessment:  Malnutrition Status:  Severe malnutrition (07/22/22 1115)    Context:  Chronic Illness     Findings of the 6 clinical characteristics of malnutrition:  Energy Intake:  75% or less estimated energy requirements for 1 month or longer  Weight Loss:  10% over 6 months     Body Fat Loss:  Severe body fat loss Orbital   Muscle Mass Loss:  Severe muscle mass loss Temples (temporalis), Clavicles (pectoralis & deltoids)  Fluid Accumulation:  Mild Generalized   Strength:  Not Performed    Nutrition Assessment:    Pt went to ED with hx of dark stools, hgb was 4.9 given 2 uints PRBC's. Since yesterday pt has gotten 2 units FFP and 4 blood transfusions. Yesterday had EGD with no acitve blleding up to 3rd part of duodenum. Nurse reports pt had CTA this morning, question if bleeding 4th part of duodenum or colon. Pt was recently at [facility]. V's for encephalopathy & hypoxia was on vent; family feels her intake has been poor for a few months and has lost weight. Nutrition Related Findings:    Edema: +1 Generalized, +2 sacral. MBSS done 7-10 with recommendation for Soft & Bite Sized with thin liquids. HX: CHF, COPD, DM, CVA, CKD on dialysis. Labs & meds reviewed. Wound Type: None       Current Nutrition Intake & Therapies:    Average Meal Intake: 1-25%, 26-50%     ADULT DIET; Clear Liquid    Anthropometric Measures:  Height: 4' 11\" (149.9 cm)  Ideal Body Weight (IBW): 95 lbs (43 kg)    Admission Body Weight: 138 lb (62.6 kg)  Current Body Weight: 138 lb (62.6 kg), 145.3 % IBW. Weight Source: Bed Scale  Current BMI (kg/m2): 27.9  Usual Body Weight: 156 lb (70.8 kg)  % Weight Change (Calculated): -11.5                    BMI Categories: Overweight (BMI 25.0-29. 9)    Estimated Daily Nutrient Needs:  Energy Requirements Based On: Kcal/kg  Weight Used for Energy Requirements: Current  Energy (kcal/day): 1727-8260 kcals based on 30-32 kcals/kg  Weight Used for Protein Requirements: Ideal  Protein (g/day): 77-86 gm protein based on 1.8-2 gm/kg         Nutrition Diagnosis:   Severe malnutrition related to inadequate protein-energy intake as evidenced by weight loss, poor intake prior to admission, severe loss of subcutaneous fat, severe muscle loss    Nutrition Interventions:   Food and/or Nutrient Delivery: Continue Current Diet  Nutrition Education/Counseling: No recommendation at this time  Coordination of Nutrition Care: Continue to monitor while inpatient       Goals:     Goals: Meet at least 75% of estimated needs       Nutrition Monitoring and Evaluation:   Behavioral-Environmental Outcomes: None Identified  Food/Nutrient Intake Outcomes: Diet Advancement/Tolerance  Physical Signs/Symptoms Outcomes: Biochemical Data, GI Status, Hemodynamic Status, Fluid Status or Edema, Weight    Discharge Planning:     Too soon to determine     Ricardo Capellan, [address] [place] [address],   [phone]

## 2022-07-22 NOTE — PROGRESS NOTES
renal disease) ([facility] [address] Essential hypertension, Hallucinations, Hard of hearing, Head contusion, Iron deficiency anemia, unspecified, Meningioma ([facility] [place] [address] Myocardial infarction ([facility] Pure hypercholesterolemia, Type 2 diabetes mellitus with stage 4 chronic kidney disease, without long-term current use of insulin ([facility] [place] [address] Unspecified venous (peripheral) insufficiency, and Unspecified vitamin D deficiency. Past Surgical History:   has a past surgical history that includes Tonsillectomy and adenoidectomy; Coronary artery bypass graft; intubation (3/7/2022); Thoracentesis (3/10/2022); Upper gastrointestinal endoscopy (N/A, 3/15/2022); Colonoscopy (N/A, 3/15/2022); IR NONTUNNELED VASCULAR CATHETER > 5 YEARS (6/28/2022); IR TUNNELED CVC PLACE WO SQ PORT/PUMP > 5 YEARS (7/8/2022); and Upper gastrointestinal endoscopy (N/A, 7/21/2022).     Restrictions  Restrictions/Precautions  Restrictions/Precautions: General Precautions, Fall Risk (O2 at 2 L, peripheral IVs left and right antecubitals, right IJ)  Required Braces or Orthoses?: Yes (Unaboots)  Implants present? : Metal implants (Cardiac stents)  Position Activity Restriction  Other position/activity restrictions: up w/ assist      Vitals  Vitals  Heart Rate: 86  Heart Rate Source: Monitor  BP: (!) 150/58  BP Location: Right upper arm  BP Method: Automatic  Patient Position: Semi fowlers  MAP (Calculated): 88.67  Resp: (P) 20  SpO2: 100 %  O2 Device: (P) Nasal cannula  Comment: 2L O2     Subjective  Subjective: \"it feels like someone hit me in the back of the head with a book\"  Comments: Okay for OT/PT eval per RN Dayami/Priya      Objective  Cognition      Sensation  Overall Sensation Status: WFL    Activities of Daily Living  ADL  Feeding: Modified independent   Grooming: Minimal assistance  UE Bathing: Minimal assistance  LE Bathing: Maximum assistance  UE Dressing: Minimal assistance  LE Dressing: Maximum assistance  Toileting: Maximum assistance  Additional Comments: ADL scores based on skilled observation and clinical reasoning unless otherwise noted.  Pt limited by decreased strength, cognition, endurance, activity tolerance and increased fatigue, limiting pt's ability to safely and independently complete self-care and mobility    Gross Assessment  AROM: Within functional limits  Strength: Generally decreased, functional  Coordination: Generally decreased, functional  Tone: Normal  Sensation: Intact    UE Function  LUE AROM (degrees)  LUE AROM : WFL  Left Hand AROM (degrees)  Left Hand AROM: WFL  Tone LUE  LUE Tone: Normotonic       RUE AROM (degrees)  RUE AROM : WFL  Right Hand AROM (degrees)  Right Hand AROM: WFL  Tone RUE  RUE Tone: Normotonic            Fine Motor Skills/Coordination  Coordination  Movements Are Fluid And Coordinated: No  Coordination and Movement Description: Fine motor impairments, Decreased accuracy, Decreased speed, Right UE, Left UE                Mobility  Bed Mobility  Bed mobility  Bed Mobility Comments: KATINA due to pt being transported to dialysis    Balance  Balance  Sitting Balance: Unable to assess(comment) (Pt about to go to dialysis)  Standing Balance: Unable to assess(comment) (Pt about to go to dialysis)  Standing Balance  Comment: KATINA due to pt about to go to dialysis    Transfers  Transfers  Transfer Comments: KATINA due to pt about to be transported to dialysis    Functional Mobility  Functional Mobility Comments: KATINA due to pt about to be transported to dialysis    Assessment  Assessment  Performance deficits / Impairments: Decreased functional mobility , Decreased ADL status, Decreased strength, Decreased safe awareness, Decreased cognition, Decreased endurance, Decreased high-level IADLs, Decreased vision/visual deficit, Decreased fine motor control, Decreased coordination  Treatment Diagnosis: Impaired self-care status  Prognosis: Good  Decision Making: Medium Complexity  Discharge Recommendations: Patient would benefit from continued therapy after discharge    Activity Tolerance  Activity Tolerance: Treatment limited secondary to decreased cognition    Safety Devices  Type of Devices:  All gudelia prominences offloaded, All fall risk precautions in place, Bed alarm in place, Call light within reach, Gait belt, Patient at risk for falls, Left in bed, Nurse notified    Patient Education  Patient Education  Education Given To: Patient, Family  Education Provided: Role of Therapy, Plan of Care  Education Method: Verbal  Barriers to Learning: Cognition, Hearing, Vision  Education Outcome: Continued education needed      Functional Outcome Measures  AM-PAC Daily Activity Inpatient   How much help for putting on and taking off regular lower body clothing?: A Lot  How much help for Bathing?: A Lot  How much help for Toileting?: A Lot  How much help for putting on and taking off regular upper body clothing?: A Little  How much help for taking care of personal grooming?: A Little  How much help for eating meals?: None  AM-[facility] Inpatient Daily Activity Raw Score: 16  AM-PAC Inpatient ADL T-Scale Score : 35.96  ADL Inpatient CMS 0-100% Score: 53.32  ADL Inpatient CMS G-Code Modifier : CK       Goals     Short Term Goals  Time Frame for Short term goals: By discharge  Short Term Goal 1: Pt will complete UB ADLs with SBA and good safety  Short Term Goal 2: Pt will complete LB ADLs with Min A, good safety, and use of AE as needed  Short Term Goal 3: Pt will be educated on and explore use of DME/AE and modified techniques for increasing ease and independence with ADLs upon d/c  Short Term Goal 4: Pt will sit EOB unsupported for 10+ minutes during functional tasks for increased independence with ADLs  Short Term Goal 5: Pt will participate in 15-25 minutes of therapeutic exercise/functional activity to increase safety and independence with self-care and mobility  Short Term Goal 6: Pt will follow 100% of 2-step commands to address cognitive concerns for improved participation in meaningful occupations  Short Term Goal 7: Pt will actively participate in self-care routine at EOB or up to chair to promote increased overall strength and activity tolerance  Short Term Goal 8: OT to assess functional transfers/mobility as able    Plan  Plan  Times per Week: 5-7  Current Treatment Recommendations: Strengthening, Balance training, Functional mobility training, Endurance training, Cognitive reorientation, Pain management, Safety education & training, Patient/Caregiver education & training, Equipment evaluation, education, & procurement, Positioning, Self-Care / ADL, Home management training, Coordination training, Cognitive/Perceptual training      OT Individual Minutes  OT Individual Minutes  Time In: 0516  Time Out: 1571  Minutes: 27  Time Code Minutes   Timed Code Treatment Minutes: 15 Minutes        Electronically signed by HELENE Ibarra on 7/22/22 at 3:25 PM EDT

## 2022-07-22 NOTE — PROGRESS NOTES
Patient is on hemodialysis. Case discussed with nursing staff. No signs of active bleed. CT angiogram showed no active extravasation. Patient is otherwise hemodynamically stable. On a diet. Potassium will be replaced per nephrology. Hemoglobin stable at 8.2. Patient has received so far 4 units of PRBCs and 2 units of fresh frozen plasma. Some old bloody bowel movement but nothing active. Continue supportive care. Monitor blood work. Advance diet per GI. Surgery on standby. Central line functioning well.

## 2022-07-22 NOTE — FLOWSHEET NOTE
07/22/22 1740   Vital Signs   /60   Temp 98.1 °F (36.7 °C)   Heart Rate 93   Resp 20   Weight 132 lb 4.4 oz (60 kg)   Weight Method Bed scale   Percent Weight Change -4.76   Pain Assessment   Pain Assessment None - Denies Pain   Post-Hemodialysis Assessment   Post-Treatment Procedures Blood returned;Catheter capped, clamped with Citrate x 2 ports   Machine Disinfection Process Acid/Vinegar Clean;Heat Disinfect; Exterior Machine Disinfection   Rinseback Volume (ml) 300 ml   Blood Volume Processed (Liters) 65.4 l/min   Dialyzer Clearance Lightly streaked   Duration of Treatment (minutes) 180 minutes   Hemodialysis Output (ml) 3000 ml   Tolerated Treatment Good   Patient Response to Treatment Toll well   Bilateral Breath Sounds Diminished   Edema None   Time Off 1739   Patient Disposition Return to room   completed 3 HR HD TX and removed 3 Liters of fluid. pt tolerated HD TX well. pre weight 63kg, post weight 60.0kg. CVC dressing changed and is clean, dry and intact. report given to primary nurse, El Patten RN.

## 2022-07-22 NOTE — ACP (ADVANCE CARE PLANNING)
Advance Care Planning   Healthcare Decision Maker:    Primary Decision Maker: Kristi Norris Child - [phone]    Primary Decision Maker: Gypsy Rodriguez - Child - [phone]    Primary Decision Maker: Minor Calles Child - [phone]    Primary Decision Maker: Lucio Aschoff - Child - [phone]    Writer met with patient and her daughter Aida Boyle; patient stated she wanted her sister to be her primary medical decision maker. Writer explained that according to the laws of the [facility] [place], all four of her children are equally her medical decisions makers. Writer told patient that she needed to complete ACP documents in order for her sister to be her primary HCPOA; patient. stated she did not want to complete these documents at this time; writer provided patient and her daughter ACP documents and explained that chaplains are available everyday to help complete them. SC remains available in this regard.

## 2022-07-22 NOTE — FLOWSHEET NOTE
SC visit with patient and her daughter Priscilla Brochure; patient talked about all the losses that have occurred in her family within the past year; patient also talked about her childhood and her family dynamic; patient sandra-filled and finds strength in prayer; discussed ACP documents with patient; see ACP note this date; listening presence and support; welcomed prayer;     07/22/22 1434   Encounter Summary   Encounter Overview/Reason  Spiritual/Emotional Needs; Advance Care Planning   Service Provided For: Patient and family together   Referral/Consult From: [facility];Family members   Last Encounter  07/22/22   Complexity of Encounter Moderate   Spiritual/Emotional needs   Type Spiritual Support   Grief, Loss, and Adjustments   Type Grief and loss   Palliative Care   Type Palliative Care, Follow-up   Assessment/Intervention/Outcome   Assessment Powerlessness; Tearful;Impaired resilience   Intervention Confronted/Challenged; Discussed belief system/Adventist practices/sandra;Discussed illness injury and its impact; Explored/Affirmed feelings, thoughts, concerns;Grief Care;Prayer (assurance of)/[place];Sustaining Presence/Ministry of presence   Outcome Coping;Engaged in conversation;Expressed feelings, needs, and concerns;Expressed Gratitude;Grieving;Receptive

## 2022-07-22 NOTE — PROGRESS NOTES
Pulmonary Progress Note  P[facility] Specialists      Patient - Yane Lang,  Age - 66 y.o.    - 1950      Room Number -    N -  [id number]   Acct # - [de-identified]  Date of Admission -  2022 11:42 PM    Consulting Mary Mcgarry MD  Primary Care Physician - Jacqueline Granado MD     SUBJECTIVE   Patient appears to be in fair spirits. Denies any shortness of breath at rest no chest pain no wheezing  Patient's O2 saturations on 2 L or 100%. With the assistance of the bedside nurse, we laid her flat on the supine position. She described no shortness of breath no chest pain no increased work of breathing      OBJECTIVE   VITALS    height is 4' 11\" (1.499 m) and weight is 138 lb 14.2 oz (63 kg). Her oral temperature is 97.6 °F (36.4 °C). Her blood pressure is 150/45 (abnormal) and her pulse is 86. Her respiration is 22 and oxygen saturation is 100%. Body mass index is 28.05 kg/m². Temperature Range: Temp: 97.6 °F (36.4 °C) Temp  Av.6 °F (37 °C)  Min: 96.8 °F (36 °C)  Max: 99.6 °F (37.6 °C)  BP Range:  Systolic (40TKX), SYZ:373 , Min:83 , DEBORAH:277     Diastolic (47JTF), OSH:45, Min:29, Max:91    Pulse Range: Pulse  Av.9  Min: 69  Max: 117  Respiration Range: Resp  Av.8  Min: 16  Max: 32  Current Pulse Ox[de-identified]  SpO2: 100 %  24HR Pulse Ox Range:  SpO2  Av.7 %  Min: 84 %  Max: 100 %  Oxygen Amount and Delivery: O2 Flow Rate (L/min): 2 L/min    Wt Readings from Last 3 Encounters:   22 138 lb 14.2 oz (63 kg)   22 133 lb 9.6 oz (60.6 kg)   22 152 lb (68.9 kg)       I/O (24 Hours)    Intake/Output Summary (Last 24 hours) at 2022 1208  Last data filed at 2022 0745  Gross per 24 hour   Intake 915.85 ml   Output 3500 ml   Net -2584.15 ml       EXAM     General Appearance  Awake, alert, oriented, in no acute distress  HEENT - normocephalic, atraumatic.   Neck - Supple,  trachea midline   Lungs -decreased breath sounds at the bases posteriorly  Heart Exam:PMI normal. No lifts, heaves, or thrills. RRR. No murmurs, clicks, gallops, or rubs  Abdomen Exam: Abdomen soft, non-tender. Extremity Exam: No signs of cyanosis    MEDS      [START ON 7/23/2022] ciprofloxacin  400 mg IntraVENous Q24H    tiotropium  2 puff Inhalation Daily    [Held by provider] pantoprazole (PROTONIX) 40 mg injection  40 mg IntraVENous Daily    budesonide-formoterol  2 puff Inhalation BID    hydrocortisone  25 mg Rectal BID    [Held by provider] QUEtiapine  25 mg Oral Nightly    [Held by provider] levothyroxine  25 mcg Oral Daily    [Held by provider] furosemide  80 mg Oral Daily    [Held by provider] atorvastatin  40 mg Oral Nightly    [Held by provider] amiodarone  100 mg Oral Daily    metoprolol  2.5 mg IntraVENous Q6H    sodium chloride flush  5-40 mL IntraVENous 2 times per day      pantoprazole 8 mg/hr (07/22/22 0745)    sodium chloride      sodium chloride      sodium chloride      sodium chloride      sodium chloride       sodium chloride flush, sodium chloride, albuterol sulfate HFA, sodium chloride flush, sodium chloride, ondansetron **OR** ondansetron, acetaminophen **OR** acetaminophen, sodium chloride, sodium chloride, sodium chloride    LABS   CBC   Recent Labs     07/20/22  0030 07/20/22  1511 07/22/22  1029   WBC 6.9  --   --    HGB 4.9*   < > 8.2*   HCT 15.1*   < > 25.0*   MCV 94.8  --   --      --   --     < > = values in this interval not displayed.      BMP:   Lab Results   Component Value Date/Time     07/22/2022 04:37 AM    K 2.9 07/22/2022 04:37 AM     07/22/2022 04:37 AM    CO2 29 07/22/2022 04:37 AM    BUN 24 07/22/2022 04:37 AM    LABALBU 1.9 07/20/2022 12:30 AM    CREATININE 1.25 07/22/2022 04:37 AM    CALCIUM 7.7 07/22/2022 04:37 AM    GFRAA 51 07/22/2022 04:37 AM    LABGLOM 42 07/22/2022 04:37 AM     ABGs:  Lab Results   Component Value Date/Time    PHART 7.524 07/20/2022 04:08 PM    PO2ART PM

## 2022-07-22 NOTE — FLOWSHEET NOTE
Per pts. Request daughter, Karena Donaldson notified that Mom is back in her room after completing HD.

## 2022-07-22 NOTE — PROGRESS NOTES
Physical Therapy  Facility/Department: [facility]  Physical Therapy Initial Assessment    Name: Yvonne He  : 89  MRN: [id number]  Date of Service: 2022    Discharge Recommendations:  Patient would benefit from continued therapy after discharge   PT Equipment Recommendations  Equipment Needed:  (TBD)      Patient Diagnosis(es): The primary encounter diagnosis was Gastrointestinal hemorrhage, unspecified gastrointestinal hemorrhage type. A diagnosis of Anemia, unspecified type was also pertinent to this visit. Past Medical History:  has a past medical history of Acute CVA (cerebrovascular accident) ([address] Acute on chronic combined systolic (congestive) and diastolic (congestive) heart failure (HCC), Arthritis, Asthma, Bradycardia, ESRD (end stage renal disease) ([address] Essential hypertension, Hallucinations, Hard of hearing, Head contusion, Iron deficiency anemia, unspecified, Meningioma ([address] Myocardial infarction ([facility] Pure hypercholesterolemia, Type 2 diabetes mellitus with stage 4 chronic kidney disease, without long-term current use of insulin ([address] Unspecified venous (peripheral) insufficiency, and Unspecified vitamin D deficiency. Past Surgical History:  has a past surgical history that includes Tonsillectomy and adenoidectomy; Coronary artery bypass graft; intubation (3/7/2022); Thoracentesis (3/10/2022); Upper gastrointestinal endoscopy (N/A, 3/15/2022); Colonoscopy (N/A, 3/15/2022); IR NONTUNNELED VASCULAR CATHETER > 5 YEARS (2022); IR TUNNELED CVC PLACE WO SQ PORT/PUMP > 5 YEARS (2022); and Upper gastrointestinal endoscopy (N/A, 2022). Assessment   Body Structures, Functions, Activity Limitations Requiring Skilled Therapeutic Intervention: Decreased functional mobility ; Decreased endurance; Increased pain  Assessment: continue per POC to maxmize potential for safe D/C- pt would benefit from skilled therapy at D/C.   Treatment Diagnosis: impaired mobility due to weakness and debilitation  Specific Instructions for Next Treatment: further evaluate mobility, instruct in exercise  Therapy Prognosis: Fair  Decision Making: Medium Complexity  History: pt admitted due to GI bleed  Exam: ROM, MMT, bed mobility  Clinical Presentation: bedside evaluation completed, max x 1 to roll either direction, FALL RISK, O2 at 2 L, will need mobility assessed as pt leaving for dialysis shortly  Barriers to Learning: extremely [place], legally blind  Requires PT Follow-Up: Yes  Activity Tolerance  Activity Tolerance: Patient limited by fatigue;Patient limited by endurance     Plan   Plan  Plan:  (5-7 treatments/ week)  Specific Instructions for Next Treatment: further evaluate mobility, instruct in exercise  Current Treatment Recommendations: Strengthening, Patient/Caregiver education & training, Endurance training, Balance training, Functional mobility training, Transfer training, Gait training, Safety education & training, Equipment evaluation, education, & procurement  Safety Devices  Type of Devices:  All fall risk precautions in place, Patient at risk for falls, Bed alarm in place, Call light within reach, Left in bed, Nurse notified     Restrictions  Restrictions/Precautions  Restrictions/Precautions: General Precautions, Fall Risk (O2 at 2 L, peripheral IVs left and right antecubitals, right IJ)  Required Braces or Orthoses?: Yes (Unaboots)  Implants present? : Metal implants (Cardiac stents)  Required Braces or Orthoses  Right Lower Extremity Brace: Boot  RLE Brace Type: Unaboots  Left Lower Extremity Brace: Boot  LLE Brace Type: Unaboots  Position Activity Restriction  Other position/activity restrictions: up w/ assist     Subjective   Pain: Headache; 4-5/10 pain  General  Patient assessed for rehabilitation services?: Yes  Response To Previous Treatment: Not applicable  Family / Caregiver Present: Yes (dtr)  Referring Practitioner: Dr. Sadie Barthel  Referral Date : 07/21/22  Diagnosis: gastrointestinal hemmorrhage  Follows Commands: Within Functional Limits  Other (Comment): OK per charge nurse Dayami and pt's nurse to proceed w/ PT evaluation but bed only as pt leaving shortly for dialysis. General Comment  Comments: pt had an EGD on 7-. Per chart, pt was recently at [facility] and D/C to SNF for 1 1/2 days prior to readmit to [facility]. Subjective  Subjective: Pt anxious to get therapy started. Social/Functional History  Social/Functional History  Lives With: Son  Type of Home: House  Home Layout: Two level, Able to Live on Main level with bedroom/bathroom, 1/2 bath on main level  Home Access: Stairs to enter with rails  Entrance Stairs - Number of Steps: 3 MARVIN  Entrance Stairs - Rails: Left  Bathroom Shower/Tub: Tub/Shower unit (On second floor; only washes up at sink at home)  [employer]: Handicap height  Bathroom Equipment: Toilet raiser, Grab bars around toilet  Bathroom Accessibility: Not accessible  Home Equipment: Douglas Asai, rolling, Cane, Lift chair, Oxygen, Reacher  Has the patient had two or more falls in the past year or any fall with injury in the past year?: No  ADL Assistance: [address]: Needs assistance  Homemaking Responsibilities: No (Son completes)  Ambulation Assistance: Needs assistance (Son helping her mobilize- had been tandem walking w/ family assist to bathroom only per dtr, uses W/C a lot)  Transfer Assistance: Needs assistance (Son helps when needed)  Active : No  Patient's  Info: son or daughter  IADL Comments: Pt reports sleeping in lift chair  Additional Comments: Pt reports she lives with her son who usually works 2pm-10pm. Son or daughter previously helping her ambulate/transfer for safety because walker does not fit into bathroom. Pt/daughter reports someone is usually always staying with her.  Pt reports her son takes care of all home responsibilities  Vision/Hearing  Vision  Vision: Impaired  Vision Exceptions: Legally blind (Blind in R eye)  Hearing  Hearing: Exceptions to [facility]  Hearing Exceptions: Hard of hearing/hearing concerns; No hearing aid (Deaf in L ear)    Cognition   Orientation  Overall Orientation Status: Within Functional Limits  Orientation Level: Oriented X4     Objective   SpO2: 100 %  O2 Device: Nasal cannula  Comment: 2L O2     Observation/Palpation  Observation: 2L O2 nasal cannula;  Peripheral IV L antecubital; Peripheral IV R antecubital; Hemodialysis central access right; CVC triple lumen internal jugular        AROM RLE (degrees)  RLE AROM: WFL  AROM LLE (degrees)  LLE AROM : WFL  AROM RUE (degrees)  RUE General AROM: see OT for UE assessment  AROM LUE (degrees)  LUE General AROM: see OT for UE assessment  Strength RLE  Comment: grossly 3+/5  Strength LLE  Comment: grossly 3-/5  Strength RUE  Comment: see OT for UE assessment  Strength LUE  Comment: see OT for UE assessment     Sensation  Overall Sensation Status: WFL     Bed mobility  Rolling to Left: Maximum assistance  Rolling to Right: Maximum assistance  Bed Mobility Comments: KATINA dangling or transfers to chair due to pt being transported to dialysis  Transfers  Comment: deferred due to dialysis  Ambulation  Comments: deferred due to dialysis     Balance  Comments: deferred due to dialysis           OutComes Score                                                  AM-PAC Score  AM-PAC Inpatient Mobility Raw Score : 7 (07/22/22 [facility])  AM-PAC Inpatient T-Scale Score : 26.42 (07/22/22 [facility])  Mobility Inpatient CMS 0-100% Score: 92.36 (07/22/22 [facility])  Mobility Inpatient CMS G-Code Modifier : CM (07/22/22 [facility])          Tinneti Score       Goals  Short Term Goals  Time Frame for Short term goals: 5-7 treatments/ week  Short term goal 1: pt to tolerate 1/2 hour of therapy keeping O2 sats above 90%  Short term goal 2: pt to demonstrate good technique w/ LE strengthenining exercises  Short term goal 3: pt to demonstrate rolling in bed either direction using rail w/ min x 1 for pressure relief  Short term goal 4: pt to advance to and demonstrate transfers supine <> sit w/ min x 2 w/ O2 as ordered  Short term goal 5: pt to advance to and demonstrate fair sitting balance at the EOB and increase sitting tolerance to 10 minutes w/ SBA x 1 w/ O2 as ordered  Additional Goals?: Yes  Short Term Goal 6: pt to advance to and demonstrate transfers sit <> stand and bed <> chair using wheeled walker  w/ min x 2 w/ O2 as ordered  Short Term Goal 7: pt to advance to and demonstrate gait 10-20' using wheeled walker  w/ min x 2 w/ O2 as ordered  Short Term Goal 8: pt to advance to and demonstrate fair or better standing balance using wheeled walker  Patient Goals   Patient goals : get stronger, sit up in chair       Education  Patient Education  Education Given To: Patient; Other (Comment) (dtr)  Education Provided: Plan of Care  Education Method: Demonstration;Verbal  Barriers to Learning: Hearing;Vision  Education Outcome: Continued education needed      Therapy Time   Individual Concurrent Group Co-treatment   Time In 9981         Time Out 1422         Minutes 27         Timed Code Treatment Minutes: [facility], PT

## 2022-07-22 NOTE — PROGRESS NOTES
Writer spoke to Ren Vitale CNP, regarding lab draws. Phlebotomist unable to get labs at this time and labs are ordered q4h. Orders received for line draw.

## 2022-07-22 NOTE — PLAN OF CARE
Problem: Discharge Planning  Goal: Discharge to home or other facility with appropriate resources  7/22/2022 1250 by Maudie Klinefelter, RN  Outcome: Progressing  7/22/2022 0046 by Asha Butler RN  Outcome: Progressing     Problem: Skin/Tissue Integrity  Goal: Absence of new skin breakdown  Description: 1. Monitor for areas of redness and/or skin breakdown  2. Assess vascular access sites hourly  3. Every 4-6 hours minimum:  Change oxygen saturation probe site  4. Every 4-6 hours:  If on nasal continuous positive airway pressure, respiratory therapy assess nares and determine need for appliance change or resting period.   7/22/2022 1250 by Maudie Klinefelter, RN  Outcome: Progressing  7/22/2022 0046 by Asha Butler RN  Outcome: Progressing     Problem: Safety - Adult  Goal: Free from fall injury  7/22/2022 1250 by Maudie Klinefelter, RN  Outcome: Progressing  7/22/2022 0046 by Asha Butler RN  Outcome: Progressing  Flowsheets (Taken 7/22/2022 0043)  Free From Fall Injury: Instruct family/caregiver on patient safety     Problem: ABCDS Injury Assessment  Goal: Absence of physical injury  7/22/2022 1250 by Maudie Klinefelter, RN  Outcome: Progressing  7/22/2022 0046 by Asha Butler RN  Outcome: Progressing  Flowsheets (Taken 7/22/2022 0043)  Absence of Physical Injury: Implement safety measures based on patient assessment     Problem: Chronic Conditions and Co-morbidities  Goal: Patient's chronic conditions and co-morbidity symptoms are monitored and maintained or improved  7/22/2022 1250 by Maudie Klinefelter, RN  Outcome: Progressing  7/22/2022 0046 by Asha Butler RN  Outcome: Progressing  Flowsheets (Taken 7/21/2022 0701 by Kalina Grubbs RN)  Care Plan - Patient's Chronic Conditions and Co-Morbidity Symptoms are Monitored and Maintained or Improved:   Monitor and assess patient's chronic conditions and comorbid symptoms for stability, deterioration, or improvement   Collaborate with multidisciplinary team to address chronic and comorbid conditions and prevent exacerbation or deterioration   Update acute care plan with appropriate goals if chronic or comorbid symptoms are exacerbated and prevent overall improvement and discharge  Note: Hemoglobin stable. Continue with loose tarry stool.      Problem: Pain  Goal: Verbalizes/displays adequate comfort level or baseline comfort level  7/22/2022 1250 by Angel Danielson RN  Outcome: Progressing  Flowsheets (Taken 7/22/2022 1250)  Verbalizes/displays adequate comfort level or baseline comfort level:   Assess pain using appropriate pain scale   Administer analgesics based on type and severity of pain and evaluate response   Implement non-pharmacological measures as appropriate and evaluate response  7/22/2022 0046 by Henry Pineda RN  Outcome: Progressing     Problem: Nutrition Deficit:  Goal: Optimize nutritional status  Outcome: Progressing

## 2022-07-22 NOTE — PROGRESS NOTES
[address]    PROGRESS NOTE             7/22/2022    9:22 AM    Name:   Leah Leonard  MRN:     [id number]     Acct:      [de-identified]   Room:   2009/2009-01  IP Day:  2  Admit Date:  7/19/2022 11:42 PM    PCP:  Gavino Cyr MD  Code Status:  Full Code    Subjective:     C/C:   Chief Complaint   Patient presents with    Melena     Interval History Status: improved. Patient seen and examined at the bed side, no new acute events overnight. Yesterday patient underwent EGD, what did not call active bleeding. Today patient's potassium was 2.9.  8.4 hemoglobin    Patient did have any abdominal pain or any other complaints    Notes from nursing staff and Consults had been reviewed, and the overnight progress had been checked with the nursing staff as well. Brief History:     Patient seen and examined at the bed side. Patient improved mentally and physically states she is better than yesterday. she is awake conversational fully oriented by 3 and alert. Patient gives history about her medical illness, and asked question about her stay. However, patient asked questions that cannot be understood completely [in terms of comprehension, patient's words are clear]. Nurse today reported patient still have black stool         Patient request that she can be seated, her lower back hurts and feels like sandpaper, she states she can sit with her son and 2 nurses. Patient reports being anxious about his current illness, and she wants to know where we are at at this point and asked about the nephrologist [kidney doctor]. Patient received 2 fresh frozen plasma and 4 blood transfusion since yesterday, most recently today [hemoglobin currently at 9.6]. Notes from nursing staff and Consults had been reviewed.   Nurse says patient still has dark stools    Review of Systems:     CONSTITUTIONAL:  no fevers  Psych: Normal mood and affect, no depression, no suicidal ideation. EYES: negative for blury vision  HEENT: headaches, no nasal congestion, no difficulty swallowing  RESPIRATORY: Cough  CARDIOVASCULAR: negative for chest pain, no palpitations  GASTROINTESTINAL: no nausea, no vomiting, no change in bowel habits, no abdominal pain  + melena  GENITOURINARY: negative for dysuria, no hematuria   MUSCULOSKELETAL: no joint pains, no muscle aches, no swelling of joints or extremities  NEUROLOGICAL: No weakness or numbness      Medications: Allergies:     Allergies   Allergen Reactions    Latex Rash    Aleve [Naproxen Sodium]      Chronic kidney disease stage III, CHF    Pioglitazone Other (See Comments)     Congestive heart failure    Claritin [Loratadine]     Keflex [Cephalexin]     Lisinopril      Needs clarification of contraindication       Current Meds:   Scheduled Meds:    [START ON 7/23/2022] ciprofloxacin  400 mg IntraVENous Q24H    tiotropium  2 puff Inhalation Daily    [Held by provider] pantoprazole (PROTONIX) 40 mg injection  40 mg IntraVENous Daily    budesonide-formoterol  2 puff Inhalation BID    hydrocortisone  25 mg Rectal BID    [Held by provider] QUEtiapine  25 mg Oral Nightly    [Held by provider] levothyroxine  25 mcg Oral Daily    [Held by provider] furosemide  80 mg Oral Daily    [Held by provider] atorvastatin  40 mg Oral Nightly    [Held by provider] amiodarone  100 mg Oral Daily    metoprolol  2.5 mg IntraVENous Q6H    sodium chloride flush  5-40 mL IntraVENous 2 times per day     Continuous Infusions:    pantoprazole 8 mg/hr (07/22/22 0745)    sodium chloride      sodium chloride      sodium chloride      sodium chloride      sodium chloride       PRN Meds: potassium chloride, sodium chloride, albuterol sulfate HFA, sodium chloride flush, sodium chloride, ondansetron **OR** ondansetron, acetaminophen **OR** acetaminophen, sodium chloride, sodium chloride, sodium chloride    Data:     Past Medical History:   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 COMPARISON: Abdomen and pelvis CT 05/25/2020 HISTORY: ORDERING SYSTEM PROVIDED HISTORY: rectal bleeding TECHNOLOGIST PROVIDED HISTORY: rectal bleeding Reason for Exam: rectal bleeding FINDINGS: Activity within the blood pool, including the great vessels, heart, liver, and spleen. Accumulation of a small amount of activity in the urinary bladder over the course of the study. Abnormal bowel activity beginning by approximately 13 minutes, appearing to extend initially to left and slightly superiorly before extending to the right and inferiorly at the level of the aortic bifurcation. Active gastrointestinal bleeding during study acquisition. The source is not definitely localized with considerations including the 3rd or 4th segment of the duodenum or the mid to distal transverse colon. Consider further evaluation with CT angiography for better localization. RECOMMENDATIONS: If the patient shows hemodynamic signs of an active bleed in the next 20 hours, additional images can be acquired. XR CHEST PORTABLE    Result Date: 7/20/2022  EXAMINATION: ONE XRAY VIEW OF THE CHEST 7/20/2022 6:03 pm COMPARISON: Chest x-ray dated 20 July 2022, 1626 hours HISTORY: ORDERING SYSTEM PROVIDED HISTORY: central line place TECHNOLOGIST PROVIDED HISTORY: central line place Reason for Exam: Central line placement.  Due to pt. condition, pt. unable to raise chin FINDINGS: Right-sided central venous catheter with the tip in the superior vena cava. There is a left-sided IJ catheter with the tip in the superior vena cava. Moderate right and small left pleural effusions. Bibasilar airspace opacities. No pneumothorax. Stable cardiomediastinal silhouette     1. Interval placement of a left-sided IJ catheter with the tip in the superior vena cava. 2.  Moderate right and small left pleural effusions with bibasilar airspace opacities     XR CHEST PORTABLE    Result Date: 7/20/2022  EXAMINATION: ONE XRAY VIEW OF THE CHEST 7/20/2022 4:15 pm COMPARISON: Chest x-ray dated 19 July 2022 HISTORY: ORDERING SYSTEM PROVIDED HISTORY: Follow up, respiratory distress TECHNOLOGIST PROVIDED HISTORY: Follow up, respiratory distress Reason for Exam: Follow up, respiratory distress FINDINGS: right-sided central venous catheter with the tip in the SVC. Moderate right pleural effusion with right basilar airspace opacities appear stable. No pneumothorax. Stable cardiomegaly     Stable exam with moderate right-sided pleural effusion with right basilar airspace disease. XR CHEST PORTABLE    Result Date: 7/20/2022  EXAMINATION: ONE XRAY VIEW OF THE CHEST 7/19/2022 11:57 pm COMPARISON: Prior studies including 06/30/2022 and 07/14/2022 HISTORY: ORDERING SYSTEM PROVIDED HISTORY: pain TECHNOLOGIST PROVIDED HISTORY: pain Reason for Exam: CP and melena X several days FINDINGS: Pulmonary edema has improved from 07/14/2022. There is a persistent moderate right pleural effusion with right basilar consolidation. The heart is enlarged and unchanged. Prior sternal splitting procedure. A right IJ tunneled dialysis catheter remains in good position. Pulmonary edema has improved. Persistent moderate-sized right pleural effusion with basilar consolidation.        EKG:        Physical Examination:        PHYSICAL EXAM:  General Appearance  Alert , awake, not in acute distress  Psych: Normal mood and affect, normal behavior, not agitated, maintaining good eye contact   HEENT - Head is normocephalic, atraumatic. Neck: supple, no rigidity, normal ROM, no neck swellings, normal thyroid gland  Lungs - Bilateral equal air entry, no wheezes, rales or rhonchi, aeration good  Cardiovascular - Heart sounds are normal.  Atrial fibrillation normal rate without murmur, gallop or rub. Abdomen - Soft, nontender, nondistended, no masses or organomegaly  Neurologic - There are no new focal motor or sensory deficits  Skin - No bruising or bleeding on exposed skin area  Extremities - No cyanosis, clubbing or edema, pale skin dystrophic toenails/Fingers osteoarthritic changes      Assessment:        Primary Problem  Gastrointestinal hemorrhage    Active Hospital Problems    Diagnosis Date Noted    Gastrointestinal hemorrhage [K92.2] 07/20/2022     Priority: Medium    Dependence on renal dialysis [facility]) [Z99.2]      Priority: Medium    Paroxysmal atrial fibrillation (HCC) [I48.0] 07/28/2020    CKD stage 4 secondary to hypertension ([facility] [I12.9, N18.4] 02/20/2020       Plan:      Lower GI bleed(Melena)      -GI consulted, general surgery consulted  Central line was insertedat t left internal jugular vein-Patient still has active GI bleeding, seen as dark stools. - Patient significantly improved today  -Potassium at 2.9, replace per protocol  -Technetium scan was done, active GI bleeding can be evaluated. Cannot be determined whether at the start of the fourth part of duodenum versus transverse colon/ EGD yesterday could not localize certain point of bleeding. Patient will go for CT angio of the abdomen pelvis today  - Today urine analysis had hemoglobin    -Patient presented with Hb 4.9. Received 4 pRBC for now. 2 fresh frozen plasma.   [Hemoglobin now 9.6] current hemoglobin at 8.4    -IV Protonix bolus was given with continuous infusion  -Anticoagulation/Antiplatelets Held  --NPO     UTI [Gram negative rods, coli]  Patient started on ciprofloxacin  mg by daily      Atrial Fibrillation  -Patient had Atrial Fibrillation at ED  -Hold eliquis [due to bleeding]  -Change to metoprolol injection 2.5 Q6H(NPO)  -Hold Amiodarone [n.p.o.]     ESRD  -Nephrology on board  -Patient yesterday got confused more during dialysis, dialysis was stopped at that moment  -On Wednesday/Friday/Monday dialysis schedule. Hypothyroidism  -Hold Levothyroxine [patient n.p.o.]       DVT prophylaxis: N/A patient is bleeding(Eliqus held)  GI prophylaxis: Protonix 40 mg daily         Melinda Cordoba MD  7/22/2022  9:22 AM     Attending Physician Statement  I have discussed the care of [address] [place] [address] Drive with the resident team. I have examined the patient myself and taken ros and hpi , including pertinent history and exam findings,  with the resident. I have reviewed the key elements of all parts of the encounter with the resident. I agree with the assessment, plan and orders as documented by the resident. Principal Problem:    Gastrointestinal hemorrhage  Active Problems:    Dependence on renal dialysis (HCC)      Paroxysmal atrial fibrillation (HCC)    UTI (urinary tract infection)  Resolved Problems:    * No resolved hospital problems. *    No new bleed on EGD yesterday  CT angio today, okayed by nephro  U/cx- gm -ve rods- started cipro today,  final cx awaited  Low grade fever overnight, SIRS positive, early sepsis  Bld cx  1 bottle staph epidermidis, likely contaminant. Will rpeat cx today    S/p 4U PRBC, 2 FFP, last one 7am 7/21.  Hb today 8.4  Will reduce freq of blood draws  Will follow results of Ct angio      Electronically signed by Tammy Lynn MD

## 2022-07-22 NOTE — PLAN OF CARE
Problem: Discharge Planning  Goal: Discharge to home or other facility with appropriate resources  7/22/2022 0046 by Patricia Johnston RN  Outcome: Progressing     Problem: Skin/Tissue Integrity  Goal: Absence of new skin breakdown  Description: 1. Monitor for areas of redness and/or skin breakdown  2. Assess vascular access sites hourly  3. Every 4-6 hours minimum:  Change oxygen saturation probe site  4. Every 4-6 hours:  If on nasal continuous positive airway pressure, respiratory therapy assess nares and determine need for appliance change or resting period.   7/22/2022 0046 by Patricia Johnston RN  Outcome: Progressing     Problem: Safety - Adult  Goal: Free from fall injury  7/22/2022 0046 by Patricia Johnston RN  Outcome: Progressing  Flowsheets (Taken 7/22/2022 0043)  Free From Fall Injury: Instruct family/caregiver on patient safety  7/21/2022 1832 by Bernardo Maria RN  Outcome: Progressing  Free From Fall Injury: Based on caregiver fall risk screen, instruct family/caregiver to ask for assistance with transferring infant if caregiver noted to have fall risk factors     Problem: ABCDS Injury Assessment  Goal: Absence of physical injury  7/22/2022 0046 by Patricia Johnston RN  Outcome: Progressing  Flowsheets (Taken 7/22/2022 0043)  Absence of Physical Injury: Implement safety measures based on patient assessment     Problem: Chronic Conditions and Co-morbidities  Goal: Patient's chronic conditions and co-morbidity symptoms are monitored and maintained or improved  7/22/2022 0046 by Patricia Johnston RN  Outcome: [address] - Patient's Chronic Conditions and Co-Morbidity Symptoms are Monitored and Maintained or Improved:   Monitor and assess patient's chronic conditions and comorbid symptoms for stability, deterioration, or improvement   Collaborate with multidisciplinary team to address chronic and comorbid conditions and prevent exacerbation or deterioration   Update acute care plan with appropriate goals if chronic or comorbid symptoms are exacerbated and prevent overall improvement and discharge  Note: Hemoglobin stable. Continue with loose tarry stool.   Outcome: Progressing     Problem: Pain  Goal: Verbalizes/displays adequate comfort level or baseline comfort level  7/22/2022 0046 by Billy Pro RN  Outcome: Progressing  Outcome: Progressing

## 2022-07-23 LAB
ABSOLUTE BANDS #: 0.2 K/UL (ref 0–1)
ABSOLUTE EOS #: 0.2 K/UL (ref 0–0.4)
ABSOLUTE LYMPH #: 0.26 K/UL (ref 1–4.8)
ABSOLUTE MONO #: 0.92 K/UL (ref 0.1–1.3)
ANION GAP SERPL CALCULATED.3IONS-SCNC: 7 MMOL/L (ref 9–17)
BANDS: 4 % (ref 0–10)
BASOPHILS # BLD: 0 % (ref 0–2)
BASOPHILS ABSOLUTE: 0 K/UL (ref 0–0.2)
BUN BLDV-MCNC: 13 MG/DL (ref 8–23)
CALCIUM SERPL-MCNC: 7.7 MG/DL (ref 8.6–10.4)
CHLORIDE BLD-SCNC: 103 MMOL/L (ref 98–107)
CO2: 27 MMOL/L (ref 20–31)
CREAT SERPL-MCNC: 1.13 MG/DL (ref 0.5–0.9)
CULTURE: ABNORMAL
CULTURE: ABNORMAL
EOSINOPHILS RELATIVE PERCENT: 4 % (ref 0–4)
GFR AFRICAN AMERICAN: 57 ML/MIN
GFR NON-AFRICAN AMERICAN: 47 ML/MIN
GFR SERPL CREATININE-BSD FRML MDRD: ABNORMAL ML/MIN/{1.73_M2}
GLUCOSE BLD-MCNC: 150 MG/DL (ref 70–99)
HCT VFR BLD CALC: 25.5 % (ref 36–46)
HEMOGLOBIN: 8.1 G/DL (ref 12–16)
LYMPHOCYTES # BLD: 5 % (ref 24–44)
MCH RBC QN AUTO: 30 PG (ref 26–34)
MCHC RBC AUTO-ENTMCNC: 31.7 G/DL (ref 31–37)
MCV RBC AUTO: 94.7 FL (ref 80–100)
METAMYELOCYTES ABSOLUTE COUNT: 0.05 K/UL
METAMYELOCYTES: 1 %
MONOCYTES # BLD: 18 % (ref 1–7)
MORPHOLOGY: ABNORMAL
PDW BLD-RTO: 17.9 % (ref 11.5–14.9)
PLATELET # BLD: 138 K/UL (ref 150–450)
PMV BLD AUTO: 7.3 FL (ref 6–12)
POTASSIUM SERPL-SCNC: 4.2 MMOL/L (ref 3.7–5.3)
RBC # BLD: 2.69 M/UL (ref 4–5.2)
SEG NEUTROPHILS: 68 % (ref 36–66)
SEGMENTED NEUTROPHILS ABSOLUTE COUNT: 3.47 K/UL (ref 1.3–9.1)
SODIUM BLD-SCNC: 137 MMOL/L (ref 135–144)
SPECIMEN DESCRIPTION: ABNORMAL
WBC # BLD: 5.1 K/UL (ref 3.5–11)

## 2022-07-23 PROCEDURE — 2700000000 HC OXYGEN THERAPY PER DAY

## 2022-07-23 PROCEDURE — 6360000002 HC RX W HCPCS: Performed by: STUDENT IN AN ORGANIZED HEALTH CARE EDUCATION/TRAINING PROGRAM

## 2022-07-23 PROCEDURE — 2580000003 HC RX 258: Performed by: INTERNAL MEDICINE

## 2022-07-23 PROCEDURE — 94761 N-INVAS EAR/PLS OXIMETRY MLT: CPT

## 2022-07-23 PROCEDURE — 99232 SBSQ HOSP IP/OBS MODERATE 35: CPT | Performed by: INTERNAL MEDICINE

## 2022-07-23 PROCEDURE — 2500000003 HC RX 250 WO HCPCS: Performed by: INTERNAL MEDICINE

## 2022-07-23 PROCEDURE — 97530 THERAPEUTIC ACTIVITIES: CPT

## 2022-07-23 PROCEDURE — 6370000000 HC RX 637 (ALT 250 FOR IP): Performed by: NURSE PRACTITIONER

## 2022-07-23 PROCEDURE — 6370000000 HC RX 637 (ALT 250 FOR IP): Performed by: INTERNAL MEDICINE

## 2022-07-23 PROCEDURE — 94640 AIRWAY INHALATION TREATMENT: CPT

## 2022-07-23 PROCEDURE — 36415 COLL VENOUS BLD VENIPUNCTURE: CPT

## 2022-07-23 PROCEDURE — APPSS30 APP SPLIT SHARED TIME 16-30 MINUTES: Performed by: NURSE PRACTITIONER

## 2022-07-23 PROCEDURE — 2060000000 HC ICU INTERMEDIATE R&B

## 2022-07-23 PROCEDURE — 80048 BASIC METABOLIC PNL TOTAL CA: CPT

## 2022-07-23 PROCEDURE — 6370000000 HC RX 637 (ALT 250 FOR IP)

## 2022-07-23 PROCEDURE — 85025 COMPLETE CBC W/AUTO DIFF WBC: CPT

## 2022-07-23 PROCEDURE — 97110 THERAPEUTIC EXERCISES: CPT

## 2022-07-23 RX ORDER — POTASSIUM CHLORIDE 7.45 MG/ML
20 INJECTION INTRAVENOUS PRN
Status: DISCONTINUED | OUTPATIENT
Start: 2022-07-23 | End: 2022-07-26 | Stop reason: HOSPADM

## 2022-07-23 RX ORDER — PANTOPRAZOLE SODIUM 40 MG/1
40 TABLET, DELAYED RELEASE ORAL
Status: DISCONTINUED | OUTPATIENT
Start: 2022-07-23 | End: 2022-07-26 | Stop reason: HOSPADM

## 2022-07-23 RX ORDER — POTASSIUM CHLORIDE 7.45 MG/ML
10 INJECTION INTRAVENOUS PRN
Status: DISCONTINUED | OUTPATIENT
Start: 2022-07-23 | End: 2022-07-26 | Stop reason: HOSPADM

## 2022-07-23 RX ADMIN — LEVOTHYROXINE SODIUM 25 MCG: 0.03 TABLET ORAL at 09:26

## 2022-07-23 RX ADMIN — ATORVASTATIN CALCIUM 40 MG: 40 TABLET, FILM COATED ORAL at 21:57

## 2022-07-23 RX ADMIN — ACETAMINOPHEN 325MG 650 MG: 325 TABLET ORAL at 10:04

## 2022-07-23 RX ADMIN — ACETAMINOPHEN 325MG 650 MG: 325 TABLET ORAL at 18:24

## 2022-07-23 RX ADMIN — Medication 2 PUFF: at 20:21

## 2022-07-23 RX ADMIN — FUROSEMIDE 80 MG: 40 TABLET ORAL at 09:26

## 2022-07-23 RX ADMIN — SODIUM CHLORIDE, PRESERVATIVE FREE 10 ML: 5 INJECTION INTRAVENOUS at 21:58

## 2022-07-23 RX ADMIN — Medication 2 PUFF: at 07:17

## 2022-07-23 RX ADMIN — METOPROLOL TARTRATE 2.5 MG: 5 INJECTION, SOLUTION INTRAVENOUS at 03:58

## 2022-07-23 RX ADMIN — SODIUM CHLORIDE, PRESERVATIVE FREE 10 ML: 5 INJECTION INTRAVENOUS at 09:12

## 2022-07-23 RX ADMIN — METOPROLOL TARTRATE 25 MG: 25 TABLET, FILM COATED ORAL at 10:04

## 2022-07-23 RX ADMIN — METOPROLOL TARTRATE 2.5 MG: 5 INJECTION, SOLUTION INTRAVENOUS at 09:27

## 2022-07-23 RX ADMIN — TIOTROPIUM BROMIDE INHALATION SPRAY 2 PUFF: 3.12 SPRAY, METERED RESPIRATORY (INHALATION) at 07:17

## 2022-07-23 RX ADMIN — PANTOPRAZOLE SODIUM 40 MG: 40 TABLET, DELAYED RELEASE ORAL at 11:00

## 2022-07-23 RX ADMIN — AMIODARONE HYDROCHLORIDE 100 MG: 100 TABLET ORAL at 10:04

## 2022-07-23 RX ADMIN — QUETIAPINE FUMARATE 25 MG: 25 TABLET ORAL at 21:57

## 2022-07-23 RX ADMIN — METOPROLOL TARTRATE 25 MG: 25 TABLET, FILM COATED ORAL at 21:56

## 2022-07-23 RX ADMIN — CIPROFLOXACIN 400 MG: 2 INJECTION, SOLUTION INTRAVENOUS at 09:10

## 2022-07-23 ASSESSMENT — PAIN SCALES - GENERAL
PAINLEVEL_OUTOF10: 5

## 2022-07-23 ASSESSMENT — ENCOUNTER SYMPTOMS
ABDOMINAL PAIN: 0
VOMITING: 0
BACK PAIN: 0
COLOR CHANGE: 0
BLOOD IN STOOL: 0
NAUSEA: 0
SHORTNESS OF BREATH: 0

## 2022-07-23 ASSESSMENT — PAIN DESCRIPTION - LOCATION
LOCATION: HEAD
LOCATION: HEAD
LOCATION: HEAD;BACK

## 2022-07-23 ASSESSMENT — PAIN DESCRIPTION - DESCRIPTORS
DESCRIPTORS: ACHING

## 2022-07-23 ASSESSMENT — PAIN DESCRIPTION - ORIENTATION
ORIENTATION: LOWER
ORIENTATION: OTHER (COMMENT)

## 2022-07-23 ASSESSMENT — PAIN DESCRIPTION - FREQUENCY: FREQUENCY: CONTINUOUS

## 2022-07-23 NOTE — PLAN OF CARE
Problem: Discharge Planning  Goal: Discharge to home or other facility with appropriate resources  Outcome: Progressing     Problem: Skin/Tissue Integrity  Goal: Absence of new skin breakdown  Description: 1. Monitor for areas of redness and/or skin breakdown  2. Assess vascular access sites hourly  3. Every 4-6 hours minimum:  Change oxygen saturation probe site  4. Every 4-6 hours:  If on nasal continuous positive airway pressure, respiratory therapy assess nares and determine need for appliance change or resting period.   Outcome: Progressing     Problem: Safety - Adult  Goal: Free from fall injury  Outcome: Progressing     Problem: ABCDS Injury Assessment  Goal: Absence of physical injury  Outcome: Progressing     Problem: Chronic Conditions and Co-morbidities  Goal: Patient's chronic conditions and co-morbidity symptoms are monitored and maintained or improved  Outcome: Progressing     Problem: Pain  Goal: Verbalizes/displays adequate comfort level or baseline comfort level  Outcome: Progressing     Problem: Nutrition Deficit:  Goal: Optimize nutritional status  Outcome: Progressing

## 2022-07-23 NOTE — PROGRESS NOTES
[id number] [facility]    PROGRESS NOTE             7/23/2022    7:29 AM    Name:   Justyn Myles  MRN:     [id number]     Acct:      [de-identified]   Room:   2088/2088-01   Day:  3  Admit Date:  7/19/2022 11:42 PM    PCP:  Sandi Diamond MD  Code Status:  Full Code    Subjective:     C/C:   Chief Complaint   Patient presents with    Melena     Interval History Status:   Seen and examined at bedside this morning. Stated that she felt well last night. Still complains of fatigue. Denies any blood in the stool or urine. States that she does not have abdominal pain, chest pain or shortness of breath  Brief History:     The patient is a 66 y.o.  female, with a history of COPD, ESRD on dialysis MWF and A-fib. Patient has an extensive medical history and was transported to the ED from [address] [place] [address] [address] after being in the SNF for only 1 day. She was admitted to [address] [place] [address] 6/27-7/18 admitted for encephalopathy, hypoxia requiring mechanical ventilation and bradycardia requiring atropine and transcutaneous pacing. She sustained JOY on top of Stage 4 CKD resulting in hyperkalemia and she is now dialysis dependent. During hospitalization she had multiple episodes of recurrent anemia requiring transfusion of PRBC. Despite recurrent anemia she was discharged to SNF on eliquis. She presented to the ED due to acute rectal bleeding. Daughter is at bedside and reports that when she went to the SNF to see her mother she noticed that the patient was lethargic and more confused. Patient has had dark stools from taking iron but when her brief was changed the aide reported a large amount of bright red blood mixed with stool. Her symptoms are acute and severe with a hemoglobin of 4.9. 2 units of PRBC were ordered in the ED and GI consulted. Symptoms continued to be associated with lethargy and mild confusion.  Patient is able to state that she is in the hospital and responds to most questions appropriately in regards to her recent medical history. She does have a chronic dry cough from COPD. Daughter also reports patient having a decreased appetite and feels that patient has lost weight but is uncertain of amount. There are no reported aggravating or alleviating factors. Denies fever, chills, chest pain, abdominal pain, nausea, vomiting, diarrhea, and urinary symptoms. When confirming code status as Full code, her daughter stated that family has been waiting for patient's mentation to improve prior to further discussing her code status and goals of care. Patient has 4 adult children. Review of Systems:     Review of Systems   Constitutional:  Positive for fatigue. Respiratory:  Negative for shortness of breath. Cardiovascular:  Negative for chest pain. Gastrointestinal:  Negative for abdominal pain, blood in stool, nausea and vomiting. Genitourinary:  Negative for difficulty urinating. Musculoskeletal:  Negative for back pain. Skin:  Negative for color change. Neurological:  Negative for headaches. Psychiatric/Behavioral:  Negative for behavioral problems and confusion. Medications: Allergies:     Allergies   Allergen Reactions    Latex Rash    Aleve [Naproxen Sodium]      Chronic kidney disease stage III, CHF    Pioglitazone Other (See Comments)     Congestive heart failure    Claritin [Loratadine]     Keflex [Cephalexin]     Lisinopril      Needs clarification of contraindication       Current Meds:   Scheduled Meds:    ciprofloxacin  400 mg IntraVENous Q24H    tiotropium  2 puff Inhalation Daily    [Held by provider] pantoprazole (PROTONIX) 40 mg injection  40 mg IntraVENous Daily    budesonide-formoterol  2 puff Inhalation BID    hydrocortisone  25 mg Rectal BID    [Held by provider] QUEtiapine  25 mg Oral Nightly    [Held by provider] levothyroxine  25 mcg Oral Daily    [Held by provider] furosemide  80 mg Oral Daily [Held by provider] atorvastatin  40 mg Oral Nightly    [Held by provider] amiodarone  100 mg Oral Daily    metoprolol  2.5 mg IntraVENous Q6H    sodium chloride flush  5-40 mL IntraVENous 2 times per day     Continuous Infusions:    sodium chloride      sodium chloride      sodium chloride      sodium chloride      sodium chloride       PRN Meds: sodium chloride flush, sodium chloride, albuterol sulfate HFA, sodium chloride flush, sodium chloride, ondansetron **OR** ondansetron, acetaminophen **OR** acetaminophen, sodium chloride, sodium chloride, sodium chloride    Data:     Past Medical History:   has a past medical history of Acute CVA (cerebrovascular accident) ([facility] [address] Acute on chronic combined systolic (congestive) and diastolic (congestive) heart failure ([facility] [address] Arthritis, Asthma, Bradycardia, ESRD (end stage renal disease) ([facility] [address] Essential hypertension, Hallucinations, Hard of hearing, Head contusion, Iron deficiency anemia, unspecified, Meningioma ([facility] [address] [facility] Pure hypercholesterolemia, Type 2 diabetes mellitus with stage 4 chronic kidney disease, without long-term current use of insulin ([facility] [address] Unspecified venous (peripheral) insufficiency, and Unspecified vitamin D deficiency. Social History:   reports that she quit smoking about 22 years ago. She has a 2.50 pack-year smoking history. She has never used smokeless tobacco. She reports that she does not currently use alcohol. She reports that she does not use drugs.      Family History:   Family History   Problem Relation Age of Onset    Diabetes Mother     Heart Disease Mother     Heart Disease Father     Diabetes Sister     High Blood Pressure Sister     Diabetes Maternal Grandmother        Vitals:  BP (!) 124/50   Pulse 87   Temp 98.1 °F (36.7 °C) (Oral)   Resp 18   Ht 4' 11\" (1.499 m)   Wt 137 lb 2 oz (62.2 kg)   SpO2 98%   BMI 27.70 kg/m²   Temp (24hrs), Av °F (36.7 °C), Min:97.4 °F (36.3 °C), Max:98.4 °F (36.9 °C)    Recent Labs     07/20/22  1637 07/21/22  1421   POCGLU 96 99       I/O(24Hr): Intake/Output Summary (Last 24 hours) at 7/23/2022 0729  Last data filed at 7/22/2022 2119  Gross per 24 hour   Intake 345.85 ml   Output 3000 ml   Net -2654.15 ml       Labs:  [unfilled]    Lab Results   Component Value Date/Time    SPECIAL NOT GIVEN 06/27/2022 01:11 PM     Lab Results   Component Value Date/Time    CULTURE POSITIVE Blood Culture (A) 07/21/2022 08:17 AM    CULTURE  07/21/2022 08:17 AM     DIRECT GRAM STAIN FROM BOTTLE: GRAM POSITIVE COCCI IN CLUSTERS    CULTURE  07/21/2022 08:17 AM     Detected: Staphylococcus epidermidis Detected: mecA/C Gene Detected- Methicillin Resistant Organism Methodology- Polymerase Chain Reaction (PCR)    CULTURE (A) 07/21/2022 08:17 AM     STAPHYLOCOCCUS EPIDERMIDIS A single positive blood culture of coagulase negative Staphylocci, diphtheroids,micrococci, Cutibacterium, viridans Streptocci, Bacillus, or Lactobacillus species should be interpreted with caution and viewed as a likely skin contaminant. CULTURE  07/21/2022 08:17 AM     (NOTE) Direct Gram Stain from bottle result called to and read back by: SHERITA Watkins ON 7/22/22       [facility]    Radiology:    XR CHEST (SINGLE VIEW FRONTAL)    Result Date: 7/14/2022  EXAMINATION: ONE XRAY VIEW OF THE CHEST 7/14/2022 2:44 pm COMPARISON: Chest x-ray dated 10 July 2022 HISTORY: ORDERING SYSTEM PROVIDED HISTORY: screening TECHNOLOGIST PROVIDED HISTORY: screening FINDINGS: Stable right-sided dialysis catheter. Stable cardiomegaly with bilateral pleural effusions and bibasilar airspace opacities. Stable findings of CHF.      CT HEAD WO CONTRAST    Result Date: 6/27/2022  EXAMINATION: CT OF THE HEAD WITHOUT CONTRAST  6/27/2022 10:20 am TECHNIQUE: CT of the head was performed without the administration of intravenous contrast. Automated exposure control, iterative reconstruction, and/or weight based adjustment of the mA/kV was utilized to reduce the radiation dose to as low as reasonably achievable. COMPARISON: Head CT 03/07/2022, 02/17/2021 HISTORY: ORDERING SYSTEM PROVIDED HISTORY: AMS TECHNOLOGIST PROVIDED HISTORY: AMS Decision Support Exception - unselect if not a suspected or confirmed emergency medical condition->Emergency Medical Condition (MA) Reason for Exam: ams FINDINGS: BRAIN/VENTRICLES: Examination is mildly motion degraded. There is no acute intracranial hemorrhage or midline shift. No abnormal extra-axial fluid collection with an unchanged 2.5 cm transverse by 2.6 cm AP extra-axial mass in the anterior right temporal region compatible with a meningioma with some peripheral rim mineralization. There is mass effect on the anterior right temporal lobe with suggestion of increased surrounding edema. Partial empty sella, unchanged from prior. Encephalomalacia from small remote insults in the right caudate and left basal ganglia region. The gray-white differentiation is otherwise maintained. Parenchymal atrophy which is greatest in frontal lobes. There is no evidence of hydrocephalus. ORBITS: The visualized portion of the orbits demonstrate no acute abnormality. SINUSES: The visualized paranasal sinuses and mastoid air cells demonstrate no acute abnormality. SOFT TISSUES/SKULL:  No acute abnormality of the visualized skull or soft tissues. No acute intracranial hemorrhage or hydrocephalus. Background mild chronic small vessel white matter changes with remote lacunar insults in the right caudate and left basal ganglia, unchanged from prior. 2.6 cm anterior right temporal meningioma with suggested mild increase in the vasogenic edema along the anterior right temporal lobe relative to 4 months prior. This could be further assessed with a contrast-enhanced brain MRI.      NM GI BLOOD LOSS    Result Date: 7/20/2022  EXAMINATION: NUCLEAR MEDICINE GASTRIC BLEEDING STUDY 7/20/2022 TECHNIQUE: Following the intravenous injection of 22.5 mCi of 99mTc-labeled RBCs, a flow study and standard images of the abdomen was obtained over a total period of 60 minutes. COMPARISON: Abdomen and pelvis CT 05/25/2020 HISTORY: ORDERING SYSTEM PROVIDED HISTORY: rectal bleeding TECHNOLOGIST PROVIDED HISTORY: rectal bleeding Reason for Exam: rectal bleeding FINDINGS: Activity within the blood pool, including the great vessels, heart, liver, and spleen. Accumulation of a small amount of activity in the urinary bladder over the course of the study. Abnormal bowel activity beginning by approximately 13 minutes, appearing to extend initially to left and slightly superiorly before extending to the right and inferiorly at the level of the aortic bifurcation. Active gastrointestinal bleeding during study acquisition. The source is not definitely localized with considerations including the 3rd or 4th segment of the duodenum or the mid to distal transverse colon. Consider further evaluation with CT angiography for better localization. RECOMMENDATIONS: If the patient shows hemodynamic signs of an active bleed in the next 20 hours, additional images can be acquired. US RENAL COMPLETE    Result Date: 6/28/2022  EXAMINATION: RETROPERITONEAL ULTRASOUND OF THE KIDNEYS AND URINARY BLADDER 6/28/2022 COMPARISON: Ultrasound on 10/20/2020 HISTORY: Acute on chronic renal disease. FINDINGS: Kidneys: The right kidney measures 8.9 cm in length and the left kidney measures 11.6 cm in length. Kidneys demonstrate increased cortical echogenicity. No hydronephrosis. 1.4 cm right renal stone. Small renal cysts measure up to 1.3 cm. Bladder: Decompressed by a Stanford catheter. Chronic renal disease. Small renal cysts. 1.4 cm right renal stone. No hydronephrosis.      XR CHEST PORTABLE    Result Date: 7/20/2022  EXAMINATION: ONE XRAY VIEW OF THE CHEST 7/20/2022 6:03 pm COMPARISON: Chest x-ray dated 20 July 2022, 1626 hours HISTORY: [facility] HISTORY: central line place TECHNOLOGIST PROVIDED HISTORY: central line place Reason for Exam: Central line placement. Due to pt. condition, pt. unable to raise chin FINDINGS: Right-sided central venous catheter with the tip in the superior vena cava. There is a left-sided IJ catheter with the tip in the superior vena cava. Moderate right and small left pleural effusions. Bibasilar airspace opacities. No pneumothorax. Stable cardiomediastinal silhouette     1. Interval placement of a left-sided IJ catheter with the tip in the superior vena cava. 2.  Moderate right and small left pleural effusions with bibasilar airspace opacities     XR CHEST PORTABLE    Result Date: 7/20/2022  EXAMINATION: ONE XRAY VIEW OF THE CHEST 7/20/2022 4:15 pm COMPARISON: Chest x-ray dated 19 July 2022 HISTORY: ORDERING SYSTEM PROVIDED HISTORY: Follow up, respiratory distress TECHNOLOGIST PROVIDED HISTORY: Follow up, respiratory distress Reason for Exam: Follow up, respiratory distress FINDINGS: right-sided central venous catheter with the tip in the SVC. Moderate right pleural effusion with right basilar airspace opacities appear stable. No pneumothorax. Stable cardiomegaly     Stable exam with moderate right-sided pleural effusion with right basilar airspace disease. XR CHEST PORTABLE    Result Date: 7/20/2022  EXAMINATION: ONE XRAY VIEW OF THE CHEST 7/19/2022 11:57 pm COMPARISON: Prior studies including 06/30/2022 and 07/14/2022 HISTORY: ORDERING SYSTEM PROVIDED HISTORY: pain TECHNOLOGIST PROVIDED HISTORY: pain Reason for Exam: CP and melena X several days FINDINGS: Pulmonary edema has improved from 07/14/2022. There is a persistent moderate right pleural effusion with right basilar consolidation. The heart is enlarged and unchanged. Prior sternal splitting procedure. A right IJ tunneled dialysis catheter remains in good position. Pulmonary edema has improved.  Persistent moderate-sized right pleural effusion with 6/28/2022  EXAMINATION: ONE XRAY VIEW OF THE CHEST 6/28/2022 9:26 am COMPARISON: June 27, 2022 chest exam HISTORY: ORDERING SYSTEM PROVIDED HISTORY: pulm congestion TECHNOLOGIST PROVIDED HISTORY: pulm congestion Reason for Exam: pulmanary congestion port upr at 905am FINDINGS: Median sternotomy/stable cardiomegaly Slight improvement in still moderate diffuse bilateral interstitial infiltrates with interval decrease in still moderate right and small to moderate left pleural effusions     Slight improvement in still moderate diffuse bilateral interstitial infiltrates related to improving pulmonary edema with decreasing still moderate right and mild to moderate left pleural effusions     XR CHEST PORTABLE    Result Date: 6/27/2022  EXAMINATION: ONE XRAY VIEW OF THE CHEST 6/27/2022 9:57 am COMPARISON: 06/12/2022, 06/07/2022, 05/18/2022 HISTORY: ORDERING SYSTEM PROVIDED HISTORY: Bradycardia TECHNOLOGIST PROVIDED HISTORY: Bradycardia FINDINGS: There are mixed interstitial and alveolar opacities throughout both lungs with bilateral pleural effusions. Cardiomegaly with prior CABG. Diffuse osseous demineralization which limits radiographic assessment of the bones. Mixed interstitial and alveolar opacities throughout both lungs, increased from 3 weeks prior. Correlate for edema or contributory infection. Bilateral small left greater than right pleural effusions which are also increased from 3 weeks prior. Unchanged cardiomegaly. IR TUNNELED CVC PLACE WO SQ PORT/PUMP > 5 YEARS    Result Date: 7/8/2022  PROCEDURE: FLUOROSCOPY GUIDED CONVERSION OF A TEMPORARY DIALYSIS CATHETER TO TUNNELED DIALYSIS CATHETER. 7/8/2022. HISTORY: ORDERING SYSTEM PROVIDED HISTORY: hemodialysis access TECHNOLOGIST PROVIDED HISTORY: hemodialysis access How many lumens are being requested?->2 What side should this line be placed? ->Either What site is the preferred site? ->Internal Jugular ACUTE KIDNEY INJURY SEDATION: 50 mcg fentanyl were titrated intravenously for pain control monitored under my direction. Total intraservice time of sedation was 15 minutes. The patient's vital signs were monitored throughout the procedure and recorded in the patient's medical record by the nurse. FLUOROSCOPY DOSE AND TYPE OR TIME AND EXPOSURES: Fluoro time 0.2 minutes DAP 51 cGy cm 2 TECHNIQUE: This procedure was performed by Brad Douglas PA-C under direct supervision of Dr. Haris Golden. Informed consent was obtained after a detailed explanation of the procedure including risks, benefits, and alternatives. Universal protocol was observed. The right neck and chest were prepped and draped in standard sterile fashion using maximum sterile barrier technique. A 035 guidewire was inserted through the right internal jugular vein temporary dialysis catheter and under fluoroscopic guidance was removed over the wire. The tract was serially dilated to allow the peel away vascular sheath. After localizing the right upper chest with 1% lidocaine a 2nd incision was made. A 14.5 Slovenian by 19 cm tip to cuff dual-lumen tunneled hemodialysis catheter was inserted through the tract and out of the venotomy site of the previous temporary dialysis catheter. The dialysis catheter was inserted through the sheath with tip terminating in the right atrium. Both ports flushed and aspirated appropriately and filled with heparinized saline. The catheter was sutured to the skin and dressed appropriately. Estimated blood loss was 5 mL. The patient tolerated the procedure well and left the department stable condition. FINDINGS: Fluoroscopic image demonstrates the tip of the catheter in the right atrium. Successful ultrasound and fluoroscopy guided tunneled catheter placement . Okay to use.      CTA ABDOMEN PELVIS W CONTRAST    Result Date: 7/22/2022  EXAMINATION: CTA OF THE ABDOMEN AND PELVIS WITH CONTRAST 7/22/2022 9:30 am: TECHNIQUE: CTA of the abdomen and pelvis was performed without and with the administration of 100 mL Isovue 370 intravenous contrast. Multiplanar reformatted images are provided for review. MIP images are provided for review. 3D reconstructions were performed on independent workstation. This automated exposure control, iterative reconstruction, and/or weight based adjustment of the mA/kV was utilized to reduce the radiation dose to as low as reasonably achievable. COMPARISON: GI bleeding scan from 2 days ago, CT on 05/25/2021 HISTORY: Acute GI bleeding. FINDINGS: Post CABG changes. Mild cardiomegaly. Moderate right pleural effusion with adjacent compressive atelectasis. Mild-moderate left pleural effusion with adjacent compressive atelectasis. CTA ABDOMEN: No hyperdensity is seen within the gastrointestinal tract on noncontrast images or after contrast administration. No acute inflammation of the gastrointestinal tract. No bowel obstruction. Fatty atrophy of the pancreas. Stable 1.5 cm stone in the mid to lower right kidney. No hydronephrosis. Stable benign right adrenal nodule which could represent an adenoma or myelolipoma; no follow-up imaging recommended. Remaining solid organs and the gallbladder are unremarkable. No ascites or significant lymphadenopathy. Severe atherosclerotic disease of the abdominal aorta without aneurysm. Major mesenteric arteries are patent. Moderate stenosis of the proximal celiac artery and mild stenosis of the proximal SMA. Moderate stenosis of the proximal ALLY. No significant renal artery stenosis. CTA PELVIS: Iliac arterial system is patent bilaterally with mild-moderate stenosis involving the common iliac arteries. Bladder and uterus are unremarkable. No lymphadenopathy or free fluid. Mild anasarca. Visualized osseous structures are unremarkable. 1. No evidence of active GI bleeding. 2. Severe atherosclerotic disease with arterial stenosis as described above. 3. Stable right nephrolithiasis.  4. Mild cardiomegaly with right greater than left pleural effusions and adjacent atelectasis. XR ABDOMEN FOR NG/OG/NE TUBE PLACEMENT    Result Date: 6/28/2022  EXAMINATION: ONE SUPINE XRAY VIEW(S) OF THE ABDOMEN 6/28/2022 4:07 pm COMPARISON: None. HISTORY: ORDERING SYSTEM PROVIDED HISTORY: og tube placement TECHNOLOGIST PROVIDED HISTORY: og tube placement Portable? ->Yes FINDINGS: There are moderate bibasilar opacities there is a nasogastric tube present with tube side port projected at the junction of the gastric fundus/proximal gastric body. The NG tube tip is projected at the mid gastric body     NG tube position, as detailed     IR NONTUNNELED VASCULAR CATHETER > 5 YEARS    Result Date: 6/30/2022  PROCEDURE: ULTRASOUND GUIDED VASCULAR ACCESS. FLUOROSCOPY GUIDED PLACEMENT OF A NON-TUNNELED CATHETER. 6/28/2022. HISTORY: ORDERING SYSTEM PROVIDED HISTORY: temp cath TECHNOLOGIST PROVIDED HISTORY: temp cath Acute kidney injury SEDATION: None FLUOROSCOPY DOSE AND TYPE OR TIME AND EXPOSURES: Fluoro time 2.00 minutes  cGy cm 2 TECHNIQUE: This procedure was performed by Guerda Marcano PA-C under indirect supervision of Dr. Car Matos. Informed consent was obtained after a detailed explanation of the procedure including risks, benefits, and alternatives. Universal protocol was observed using maximum sterile barrier technique. Using ultrasound guidance, after anesthetizing the skin with one percent lidocaine, a micropuncture needle was advanced into the patent right internal jugular vein. An ultrasound image demonstrating patency of the vein with needle tip located within it was obtained and stored in PACs. A microwire was inserted under fluoroscopic guidance and the needle was removed and a 5 Scottish sheath was placed. The microwire was exchanged for an 035 wire and the tract was serially dilated to accommodate a 13.5 [place] Mary by 15 cm non tunneled duo split hemodialysis catheter. An image was saved demonstrating the catheter tip in the right atrium. The catheter flushed and aspirated appropriately. This was sutured to the skin using 2-0 Ethilon and dressed appropriately. Estimated blood loss was 5 mL. The patient tolerated the procedure well and left the Department stable condition. FINDINGS: Fluoroscopic image demonstrates the tip of the catheter in the right atrium. Successful ultrasound and fluoroscopy guided 13.5 Tunisian by 15 cm non tunneled hemodialysis catheter placement. Ready for use. FL MODIFIED BARIUM SWALLOW W VIDEO    Result Date: 7/10/2022  EXAMINATION: MODIFIED BARIUM SWALLOW WAS PERFORMED IN CONJUNCTION WITH SPEECH PATHOLOGY SERVICES TECHNIQUE: Fluoroscopic evaluation of the swallowing mechanism was performed using cineradiography with multiple consistency of barium product in conjunction with speech pathology services. FLUOROSCOPY DOSE AND TYPE OR TIME AND EXPOSURES: Fluoro time: 2 minutes Dose: 13.644 mGy COMPARISON: None HISTORY: ORDERING SYSTEM PROVIDED HISTORY: concern for aspiration TECHNOLOGIST PROVIDED HISTORY: concern for aspiration 76-year-old female with possible aspiration FINDINGS: With the thin liquid substance by straw, there was trace flash penetration without aspiration. With the thick liquid substance by teaspoon and straw, pureed/pudding thick, soft solid and cookie solid substances, there was no penetration or aspiration. 1. Trace flash penetration without aspiration with the thin liquid substance by straw. 2. No penetration or aspiration with the remainder of the administered substances. Please see separate speech pathology report for full discussion of findings and recommendations. Physical Examination:        Physical Exam  Constitutional:       Appearance: Normal appearance. She is normal weight. HENT:      Head: Normocephalic and atraumatic. Nose: Nose normal.      Mouth/Throat:      Mouth: Mucous membranes are dry. Eyes:      Extraocular Movements: Extraocular movements intact. Cardiovascular:      Rate and Rhythm: Normal rate. Rhythm irregular. Pulmonary:      Effort: Pulmonary effort is normal.      Breath sounds: Normal breath sounds. Abdominal:      General: Abdomen is flat. Palpations: Abdomen is soft. Musculoskeletal:         General: Normal range of motion. Cervical back: Neck supple. Neurological:      Mental Status: Mental status is at baseline. Psychiatric:         Behavior: Behavior normal.         Assessment:        Primary Problem  Gastrointestinal hemorrhage    Active Hospital Problems    Diagnosis Date Noted    Severe malnutrition ([facility] [E43] 07/22/2022     Priority: Medium    Gastrointestinal hemorrhage [K92.2] 07/20/2022     Priority: Medium    Dependence on renal dialysis [facility]) [Z99.2]      Priority: Medium    UTI (urinary tract infection) [N39.0] 02/18/2021    Paroxysmal atrial fibrillation ([facility] [I48.0] 07/28/2020    CKD stage 4 secondary to hypertension ([facility] [I12.9, N18.4] 02/20/2020       Plan:        Lower GI bleed([place])        -GI consulted, general surgery consulted  Central line was insertedat t left internal jugular vein  - Patient still has active GI bleeding, seen as dark stools. - Patient significantly improved today  -Potassium at 2.9, replace per nephrology   -Technetium scan was done, active GI bleeding cannot be evaluated. Cannot be determined whether at the start of the fourth part of duodenum versus transverse colon/ EGD yesterday could not localize certain point of bleeding. -CT angio yesterday showed no signs of active bleeding  - Urinalysis had hemoglobin on 7/22     -Patient presented with Hb 4.9. Received 4 pRBC for now. 2 fresh frozen plasma.   Hemoglobin currently stable    -IV Protonix bolus was given with continuous infusion  -Anticoagulation/Antiplatelets Held  -Clear liquid diet     UTI [Gram negative rods, coli]  Continue ciprofloxacin  mg by daily        Atrial Fibrillation  -Patient had Atrial Fibrillation at ED  -Hold eliquis [due to bleeding]  -Continue to metoprolol injection 2.5 Q6H  -Restarted Amiodarone      ESRD  -Nephrology on board  -Patient yesterday got confused more during dialysis, dialysis was stopped at that moment  -On Wednesday/Friday/Monday dialysis schedule. Hypothyroidism  - Restarted  Levothyroxine         DVT prophylaxis: N/A patient is bleeding(Eliqus held)  GI prophylaxis: Protonix 40 mg daily  Diet: Clear Liquid diet    Tien Garcia MD  7/23/2022  7:29 AM      Attending Physician Statement  I have discussed the care of [address] [place] [address] Drive and I have examined the patient myselft and taken ros and hpi , including pertinent history and exam findings,  with the resident. I have reviewed the key elements of all parts of the encounter with the resident. I agree with the assessment, plan and orders as documented by the resident. Spent 35 minutes in reviewing data/medicines/talking to patient/family,  explaining and answering all the questions. Principal Problem:    Gastrointestinal hemorrhage  Active Problems:    Dependence on renal dialysis (HCC)      Paroxysmal atrial fibrillation (HCC)    UTI (urinary tract infection)  Resolved Problems:    * No resolved hospital problems. *     No new bleed on EGD   CT angio today, okayed by nephro  U/cx- gm -ve rods- started cipro today,  final cx awaited  Low grade fever overnight, SIRS positive, early sepsis  Bld cx  1 bottle staph epidermidis, likely contaminant. Will rpeat cx   S/p 4U PRBC, 2 FFP, last one 7/21.  Hb today 8.4  Will reduce freq of blood draws  Ct angio, no active bleeding shown   Afib  will get cardio for [facility] help       Electronically signed by Bisi Harden MD

## 2022-07-23 NOTE — CARE COORDINATION
ONGOING DISCHARGE PLANNING NOTE:    Writer reviewed LSW notes, and discharge plan is [facility] [place]. Does NOT require authorization to return.     Electronically signed by Merlyn Winters RN on 7/23/2022 at 4:41 PM

## 2022-07-23 NOTE — PROGRESS NOTES
Physical Therapy  Facility/Department: [facility]GRESSIVE CARE  Daily Treatment Note  NAME: Yane Lang  :   MRN: [id number]    Date of Service: 2022    Discharge Recommendations:  Patient would benefit from continued therapy after discharge   PT Equipment Recommendations  Equipment Needed:  (TBD)    Patient Diagnosis(es): The primary encounter diagnosis was Gastrointestinal hemorrhage, unspecified gastrointestinal hemorrhage type. A diagnosis of Anemia, unspecified type was also pertinent to this visit. Assessment   Activity Tolerance: Patient limited by fatigue;Patient limited by endurance  Equipment Needed:  (TBD)     Plan    Plan  Plan:  (5-7 treatments/ week)  Current Treatment Recommendations: Strengthening;Patient/Caregiver education & training; Endurance training;Balance training;Functional mobility training;Transfer training;Gait training; Safety education & training;Equipment evaluation, education, & procurement  PT Plan of Care:  (5-7 treatments/ week)     Restrictions  Restrictions/Precautions  Restrictions/Precautions: General Precautions, Fall Risk (O2 at 2 L, peripheral IVs left and right antecubitals, right IJ)  Required Braces or Orthoses?: Yes (Unaboots)  Implants present? : Metal implants (Cardiac stents)  Required Braces or Orthoses  Right Lower Extremity Brace: Boot  RLE Brace Type: Unaboots  Left Lower Extremity Brace: Boot  LLE Brace Type: Unaboots  Position Activity Restriction  Other position/activity restrictions: up w/ assist     Subjective    Subjective  Subjective: nursing Electa Boxer approves treatment,pt reports minimal sleep last PM  Orientation  Overall Orientation Status: Within Functional Limits     Objective   Vitals  Heart Rate: 77  SpO2: 96 %  O2 Device: None (Room air) (2)  Bed Mobility Training  Bed Mobility Training: Yes  Overall Level of Assistance: Minimum assistance;Maximum assistance  Interventions: Verbal cues; Tactile cues  Rolling: Minimum assistance;Contact-guard assistance  Supine to Sit: Minimum assistance (head of bed elevater right hand rail)  Sit to Supine: Maximum assistance (LEs)  Scooting: Maximum assistance  Transfer Training  Transfer Training: No     PT Exercises  A/AROM Exercises: seated EOB LEs x 10 (SpO2 97 % HR 83 room air)  Static Sitting Balance Exercises: seated EOB x 12 minutes SBA (initiation C/O left chest pain with inhalaton ,  instructed facilitation increased thoracic extension , pt reports dereased lef chest pain with inhalation)             Goals  Short Term Goals  Time Frame for Short term goals: 5-7 treatments/ week  Short term goal 1: pt to tolerate 1/2 hour of therapy keeping O2 sats above 90%  Short term goal 2: pt to demonstrate good technique w/ LE strengthenining exercises  Short term goal 3: pt to demonstrate rolling in bed either direction using rail w/ min x 1 for pressure relief  Short term goal 4: pt to advance to and demonstrate transfers supine <> sit w/ min x 2 w/ O2 as ordered  Short term goal 5: pt to advance to and demonstrate fair sitting balance at the EOB and increase sitting tolerance to 10 minutes w/ SBA x 1 w/ O2 as ordered  Additional Goals?: Yes  Short Term Goal 6: pt to advance to and demonstrate transfers sit <> stand and bed <> chair using wheeled walker  w/ min x 2 w/ O2 as ordered  Short Term Goal 7: pt to advance to and demonstrate gait 10-20' using wheeled walker  w/ min x 2 w/ O2 as ordered  Short Term Goal 8: pt to advance to and demonstrate fair or better standing balance using wheeled walker  Patient Goals   Patient goals : get stronger, sit up in chair    Education  Patient Education  Education Given To: Patient  Education Provided:  (sitting posture)  Education Method: Verbal  Barriers to Learning: Hearing;Vision  Education Outcome: Continued education needed    Therapy Time   Individual Concurrent Group Co-treatment   Time In 1040         Time Out 1119         Minutes 39 Sherryle Moder AM-PAC Mobility Inpatient   How much difficulty turning over in bed?: A Lot  How much difficulty sitting down on / standing up from a chair with arms?: Unable  How much difficulty moving from lying on back to sitting on side of bed?: Unable  How much help from another person moving to and from a bed to a chair?: Total  How much help from another person needed to walk in hospital room?: Total  How much help from another person for climbing 3-5 steps with a railing?: Total  AM-PAC Inpatient Mobility Raw Score : 7  AM-PAC Inpatient T-Scale Score : 26.42  Mobility Inpatient CMS 0-100% Score: 92.36  Mobility Inpatient CMS G-Code Modifier : CM  AM-[facility] Mobility without Stair Climbing Inpatient   How much difficulty turning over in bed?: A Little  How much difficulty sitting down on / standing up from a chair with arms?: Unable  How much difficulty moving from lying on back to sitting on side of bed?: A Little  How much help from another person moving to and from a bed to a chair?: Total  How much help from another person needed to walk in hospital room?: Total  AM-[facility] Inpatient Mobility without Stair Climbing Raw Score : 9  AM-PAC Inpatient without Stair Climbing T-Scale Score : 32.44  Mobility Inpatient CMS 0-100% Score: 76.07  Mobility Inpatient without Stair CMS G-Code Modifier : SHARAD Ventura, PTA

## 2022-07-23 NOTE — PROGRESS NOTES
Department of Internal Medicine  Nephrology [facility], MD   Consult Note    Reason for consultation: Management of hemodialysis dependent new onset end-stage renal disease. Consulting physician: Tonia Kline MD.      Subjective/interval history. Patient seen and examined patient is doing better more awake and alert, patient is not in acute acute respiratory distress. Hb stable  EGD yesterday, No source of bleeding found  Patient had dialysis yesterday   Patient had CT abdomen with IV contrast did not show any active GI bleeding      History of present illness: This is a 66 y.o. female with a significant past medical history of Cerebrovascular accident, combined diastolic and systolic heart failure, essential hypertension, partial deafness and chronic kidney disease stage IV [associated with nephrotic range proteinuria [16 g/g], who was admitted to [place] [id number] from 6/27/22 to 7/18/2022 for treatment of hypoxia and bradycardia. She required endotracheal intubation and was extubated on 7/6/2022. Acute hemodialysis was started during that admission for management of worsening azotemia associated with edema and tunneled hemodialysis catheter was eventually placed on 7/8/2022. Patient was on a TTS hemodialysis schedule but was sent to the [facility] where she was supposed to be starting a MWF hemodialysis schedule. Within 24 hours of getting to the [facility], she developed black tarry stools and was sent to the hospital and admitted for GI bleed with hemoglobin 4.9 g/dL yesterday. She has remained relatively hemodynamically stable with systolic blood pressure greater than 100 mmHg. She is completing the first of 2 scheduled units of packed red blood transfusions and is due to be evaluated by GI service. She is hard of hearing but alert and oriented.     Latex, Aleve [naproxen sodium], Pioglitazone, Claritin [loratadine], Keflex [cephalexin], and Lisinopril    Past Medical History:   Diagnosis Date    Acute CVA (cerebrovascular accident) ([facility] 07/25/2020    Acute on chronic combined systolic (congestive) and diastolic (congestive) heart failure ([facility] 02/06/2022    Arthritis     Asthma     Bradycardia 06/12/2022    ESRD (end stage renal disease) ([facility]     Essential hypertension 07/25/2020    Hallucinations 02/18/2021    Hard of hearing     Head contusion 09/09/2020    Iron deficiency anemia, unspecified     Meningioma ([facility] 02/25/2021    Myocardial infarction ([place] [address].)     Pure hypercholesterolemia     Type 2 diabetes mellitus with stage 4 chronic kidney disease, without long-term current use of insulin ([facility])     Unspecified venous (peripheral) insufficiency     Unspecified vitamin D deficiency      Scheduled Meds:   pantoprazole  40 mg Oral QAM AC    metoprolol tartrate  25 mg Oral BID    ciprofloxacin  400 mg IntraVENous Q24H    tiotropium  2 puff Inhalation Daily    budesonide-formoterol  2 puff Inhalation BID    hydrocortisone  25 mg Rectal BID    QUEtiapine  25 mg Oral Nightly    levothyroxine  25 mcg Oral Daily    furosemide  80 mg Oral Daily    atorvastatin  40 mg Oral Nightly    amiodarone  100 mg Oral Daily    sodium chloride flush  5-40 mL IntraVENous 2 times per day     Continuous Infusions:   sodium chloride      sodium chloride      sodium chloride      sodium chloride      sodium chloride       PRN Meds:.potassium chloride **OR** potassium chloride, sodium chloride flush, sodium chloride, albuterol sulfate HFA, sodium chloride flush, sodium chloride, ondansetron **OR** ondansetron, acetaminophen **OR** acetaminophen, sodium chloride, sodium chloride, sodium chloride    Family History   Problem Relation Age of Onset    Diabetes Mother     Heart Disease Mother     Heart Disease Father     Diabetes Sister     High Blood Pressure Sister     Diabetes Maternal Grandmother         Social History     Socioeconomic History    Marital status:       Spouse name: None    Number of children: 6    Years of education: None    Highest education level: None   Tobacco Use    Smoking status: Former     Packs/day: 0.25     Years: 10.00     Pack years: 2.50     Types: Cigarettes     Quit date: 2000     Years since quittin.5    Smokeless tobacco: Never   Vaping Use    Vaping Use: Never used   Substance and Sexual Activity    Alcohol use: Not Currently     Alcohol/week: 0.0 standard drinks    Drug use: No    Sexual activity: Yes     Partners: Female     Review of systems: CNS - no headache or dizziness; Cardiac - no chest pain; Respiratory - no shortness of breath; Gastrointestinal - No nausea or vomiting but she has black tarry stools; Musculoskeletal - general body aches; Skin/Integument - no rashes. Physical Exam:    VITALS:  BP (!) 107/51   Pulse 80   Temp 98.2 °F (36.8 °C) (Axillary)   Resp 20   Ht 4' 11\" (1.499 m)   Wt 137 lb 2 oz (62.2 kg)   SpO2 96%   BMI 27.70 kg/m²   TEMPERATURE:  Current - Temp: 98.2 °F (36.8 °C);  Max - Temp  Av.2 °F (36.8 °C)  Min: 98.1 °F (36.7 °C)  Max: 98.4 °F (36.9 °C)  RESPIRATIONS RANGE: Resp  Av.9  Min: 18  Max: 26  PULSE RANGE: Pulse  Av.6  Min: 77  Max: 118  BLOOD PRESSURE RANGE:  Systolic (16UWB), WRW:134 , Min:107 , JGJ:868 ; Diastolic (62IIR), LRN:27, Min:48, Max:90  PULSE OXIMETRY RANGE: SpO2  Av.6 %  Min: 96 %  Max: 100 %  24HR INTAKE/OUTPUT:    Intake/Output Summary (Last 24 hours) at 2022 1538  Last data filed at 2022 1251  Gross per 24 hour   Intake 220 ml   Output 3000 ml   Net -2780 ml     Constitutional: alert, appears stated age, and cooperative    Skin: Skin color, texture, turgor normal. No rashes or lesions    Head: Normocephalic, without obvious abnormality, atraumatic     Cardiovascular/Edema: regular rate and rhythm, S1, S2 normal, no murmur, click, rub or gallop    Respiratory: Lungs: clear to auscultation bilaterally    Abdomen: soft, non-tender; bowel sounds normal; no masses,  no organomegaly    Back: symmetric, no curvature. ROM normal. No CVA tenderness. Extremities: extremities normal, atraumatic, no cyanosis or edema    Neuro:  Grossly normal      CBC:   Recent Labs     07/22/22  0437 07/22/22  1029 07/23/22  1011   WBC  --   --  5.1   HGB 8.4* 8.2* 8.1*   PLT  --   --  138*     BMP:    Recent Labs     07/21/22  0345 07/22/22  0437    139   K 4.0 2.9*    103   CO2 27 29   BUN 62* 24*   CREATININE 2.08* 1.25*   GLUCOSE 114* 76       Lab Results   Component Value Date/Time    NITRU NEGATIVE 07/20/2022 06:00 PM    COLORU Dark Yellow 07/20/2022 06:00 PM    PHUR 5.5 07/20/2022 06:00 PM    WBCUA 21 TO 50 07/20/2022 06:00 PM    RBCUA 51  07/20/2022 06:00 PM    MUCUS 1+ 06/27/2022 06:35 PM    TRICHOMONAS NOT REPORTED 02/07/2022 02:42 AM    YEAST NOT REPORTED 02/07/2022 02:42 AM    BACTERIA MANY 07/20/2022 06:00 PM    SPECGRAV 1.016 07/20/2022 06:00 PM    LEUKOCYTESUR MOD 07/20/2022 06:00 PM    UROBILINOGEN Normal 07/20/2022 06:00 PM    BILIRUBINUR NEGATIVE 07/20/2022 06:00 PM    GLUCOSEU TRACE 07/20/2022 06:00 PM    KETUA TRACE 07/20/2022 06:00 PM    AMORPHOUS 1+ 07/20/2022 06:00 PM     Urine Sodium:     Lab Results   Component Value Date/Time    SLOAN <20 06/08/2022 09:52 PM     Urine Protein:     Lab Results   Component Value Date/Time     10/27/2020 04:35 PM     Urine Creatinine:     Lab Results   Component Value Date/Time    LABCREA 63.0 06/08/2022 09:52 PM     IMPRESSION/RECOMMENDATIONS:      1.  End-stage renal disease - Secondary to diabetic nephropathy. We will place patient on a Monday/Wednesday/Friday hemodialysis schedule. Patient had dialysis yesterday she will receive acute hemodialysis today as per schedule. MWF    2.  GI bleed -patient scheduled for EGD    3. Systemic hypertension - blood pressure control is adequate. 4.  Severe anemia secondary to GI bleed. Patient however has underlying anemia of chronic kidney disease.     Hypokalemia- replace kcl    Plan  Hemodialysis Monday Wednesday Friday  Check BMP check potassium replace potassium per sliding scale    Prognosis is guarded. Thank you very much for the courtesy and confidence of this consultation.     Maximino Welch MD   Attending Nephrologist  7/23/2022 3:38 PM

## 2022-07-23 NOTE — PROGRESS NOTES
Pulmonary Progress Note  [facility]      Patient - Dane Weaver,  Age - 66 y.o.    - 1950      Room Number - 0766/6427-54   N -  [id number]   [facility]  Date of Admission -  2022 11:42 PM        Consulting Phillip Rosado MD  Primary Care Physician - Governor Ashu MD     SUBJECTIVE   Looks comfortable, only on room air (nasal cannula was in her hair)    OBJECTIVE   VITALS    height is 4' 11\" (1.499 m) and weight is 137 lb 2 oz (62.2 kg). Her oral temperature is 98.1 °F (36.7 °C). Her blood pressure is 124/50 (abnormal) and her pulse is 87. Her respiration is 18 and oxygen saturation is 98%. Body mass index is 27.7 kg/m². Temperature Range: Temp: 98.1 °F (36.7 °C) Temp  Av °F (36.7 °C)  Min: 97.4 °F (36.3 °C)  Max: 98.4 °F (36.9 °C)  BP Range:  Systolic (28LUY), STB:496 , Min:116 , SBU:122     Diastolic (16JWV), PPA:31, Min:31, Max:90    Pulse Range: Pulse  Av.4  Min: 69  Max: 118  Respiration Range: Resp  Av.3  Min: 14  Max: 28  Current Pulse Ox[de-identified]  SpO2: 98 %  24HR Pulse Ox Range:  SpO2  Av.8 %  Min: 98 %  Max: 100 %  Oxygen Amount and Delivery: O2 Flow Rate (L/min): 2 L/min    Wt Readings from Last 3 Encounters:   22 137 lb 2 oz (62.2 kg)   22 133 lb 9.6 oz (60.6 kg)   22 152 lb (68.9 kg)       I/O (24 Hours)    Intake/Output Summary (Last 24 hours) at 2022 0930  Last data filed at 2022 2119  Gross per 24 hour   Intake 120 ml   Output 3000 ml   Net -2880 ml       EXAM     General Appearance  Awake, alert, oriented, in no acute distress  HEENT - normocephalic, atraumatic.  []  Mallampati  [x] Crowded airway   [x] Macroglossia  []  Retrognathia  [] Micrognathia  []  Normal tongue size []  Normal Bite  [] Chelita sign positive    Neck - Supple,  trachea midline IJ line placed  Lungs -poor air movement  Cardiovascular - Heart sounds are normal.  Regular rate and rhythm   Abdomen - Soft, nontender, nondistended, no masses or organomegaly  Neurologic - There are no focal motor or sensory deficits  Skin - No bruising or bleeding  Extremities - No clubbing, cyanosis, edema    MEDS      ciprofloxacin  400 mg IntraVENous Q24H    tiotropium  2 puff Inhalation Daily    [Held by provider] pantoprazole (PROTONIX) 40 mg injection  40 mg IntraVENous Daily    budesonide-formoterol  2 puff Inhalation BID    hydrocortisone  25 mg Rectal BID    QUEtiapine  25 mg Oral Nightly    levothyroxine  25 mcg Oral Daily    furosemide  80 mg Oral Daily    atorvastatin  40 mg Oral Nightly    amiodarone  100 mg Oral Daily    metoprolol  2.5 mg IntraVENous Q6H    sodium chloride flush  5-40 mL IntraVENous 2 times per day      pantoprazole      sodium chloride      sodium chloride      sodium chloride      sodium chloride      sodium chloride       sodium chloride flush, sodium chloride, albuterol sulfate HFA, sodium chloride flush, sodium chloride, ondansetron **OR** ondansetron, acetaminophen **OR** acetaminophen, sodium chloride, sodium chloride, sodium chloride    LABS   CBC   Recent Labs     07/22/22  1029   HGB 8.2*   HCT 25.0*     BMP:   Lab Results   Component Value Date/Time     07/22/2022 04:37 AM    K 2.9 07/22/2022 04:37 AM     07/22/2022 04:37 AM    CO2 29 07/22/2022 04:37 AM    BUN 24 07/22/2022 04:37 AM    LABALBU 1.9 07/20/2022 12:30 AM    CREATININE 1.25 07/22/2022 04:37 AM    CALCIUM 7.7 07/22/2022 04:37 AM    GFRAA 51 07/22/2022 04:37 AM    LABGLOM 42 07/22/2022 04:37 AM     ABGs:  Lab Results   Component Value Date/Time    PHART 7.524 07/20/2022 04:08 PM    PO2ART 68.7 07/20/2022 04:08 PM    YPA4NXZ 33.6 07/20/2022 04:08 PM      Lab Results   Component Value Date/Time    MODE PRVC 07/06/2022 04:40 AM     Ionized Calcium:  No results found for: IONCA  Magnesium:    Lab Results   Component Value Date/Time    MG 1.6 07/22/2022 04:37 AM Phosphorus:    Lab Results   Component Value Date/Time    PHOS 3.1 07/13/2022 08:29 AM        LIVER PROFILE No results for input(s): AST, ALT, LIPASE, BILIDIR, BILITOT, ALKPHOS in the last 72 hours. Invalid input(s):   AMYLASE,  ALB  INR   Recent Labs     07/21/22  0345   INR 1.4     PTT   Lab Results   Component Value Date    APTT 29.5 07/21/2022         RADIOLOGY     (See actual reports for details)    ASSESSMENT/PLAN       Altered mental status-resolved  GI bleed, status post EGD 7/21 which was unrevealing, had nuclear medicine scan which was positive but CT angiogram was negative-received 4 units of packed red blood cells 2 units of FFP  End-stage renal disease on dialysis  History of CVA  History of a right anterior temporal fossa meningioma  History of COPD  Chronic hypoxic respiratory failure, wears oxygen with exertion  History of atrial fibrillation  Coronary artery disease status post CABG in 2003  Recent episode of acute respiratory failure, extubated 7/6/2022 at [facility]  Type 2 diabetes      Wean off oxygen off as tolerated  Continue bronchodilators  Does not need IV steroids  Dialysis per nephrology  No routine labs were ordered, today so we are awaiting the results labs that were just put in    Electronically signed by Candis Hardy MD on 7/23/2022 at 9:30 AM

## 2022-07-23 NOTE — PROGRESS NOTES
PRN  0.9 % sodium chloride infusion, PRN  ondansetron (ZOFRAN-ODT) disintegrating tablet 4 mg, Q8H PRN   Or  ondansetron (ZOFRAN) injection 4 mg, Q6H PRN  acetaminophen (TYLENOL) tablet 650 mg, Q6H PRN   Or  acetaminophen (TYLENOL) suppository 650 mg, Q6H PRN  0.9 % sodium chloride infusion, PRN  0.9 % sodium chloride infusion, PRN  0.9 % sodium chloride infusion, PRN        Data:     Code Status:  Full Code    Family History   Problem Relation Age of Onset    Diabetes Mother     Heart Disease Mother     Heart Disease Father     Diabetes Sister     High Blood Pressure Sister     Diabetes Maternal Grandmother        Social History     Socioeconomic History    Marital status:       Spouse name: Not on file    Number of children: 6    Years of education: Not on file    Highest education level: Not on file   Occupational History    Not on file   Tobacco Use    Smoking status: Former     Packs/day: 0.25     Years: 10.00     Pack years: 2.50     Types: Cigarettes     Quit date: 2000     Years since quittin.5    Smokeless tobacco: Never   Vaping Use    Vaping Use: Never used   Substance and Sexual Activity    Alcohol use: Not Currently     Alcohol/week: 0.0 standard drinks    Drug use: No    Sexual activity: Yes     Partners: Female   Other Topics Concern    Not on file   Social History Narrative    Not on file     Social Determinants of Health     Financial Resource Strain: Not on file   Food Insecurity: Not on file   Transportation Needs: Not on file   Physical Activity: Not on file   Stress: Not on file   Social Connections: Not on file   Intimate Partner Violence: Not on file   Housing Stability: Not on file       Vitals:  BP (!) 107/51   Pulse 80   Temp 98.2 °F (36.8 °C) (Axillary)   Resp 20   Ht 4' 11\" (1.499 m)   Wt 137 lb 2 oz (62.2 kg)   SpO2 96%   BMI 27.70 kg/m²   Temp (24hrs), Av.2 °F (36.8 °C), Min:98.1 °F (36.7 °C), Max:98.4 °F (36.9 °C)    Recent Labs     22  1421   POCGLU 99 I/O (24Hr):     Intake/Output Summary (Last 24 hours) at 7/23/2022 1656  Last data filed at 7/23/2022 1251  Gross per 24 hour   Intake 220 ml   Output 3000 ml   Net -2780 ml       Labs:      CBC:   Lab Results   Component Value Date/Time    WBC 5.1 07/23/2022 10:11 AM    RBC 2.69 07/23/2022 10:11 AM    HGB 8.1 07/23/2022 10:11 AM    HCT 25.5 07/23/2022 10:11 AM    MCV 94.7 07/23/2022 10:11 AM    MCH 30.0 07/23/2022 10:11 AM    MCHC 31.7 07/23/2022 10:11 AM    RDW 17.9 07/23/2022 10:11 AM     07/23/2022 10:11 AM    MPV 7.3 07/23/2022 10:11 AM     CBC with Differential:    Lab Results   Component Value Date/Time    WBC 5.1 07/23/2022 10:11 AM    RBC 2.69 07/23/2022 10:11 AM    HGB 8.1 07/23/2022 10:11 AM    HCT 25.5 07/23/2022 10:11 AM     07/23/2022 10:11 AM    MCV 94.7 07/23/2022 10:11 AM    MCH 30.0 07/23/2022 10:11 AM    MCHC 31.7 07/23/2022 10:11 AM    RDW 17.9 07/23/2022 10:11 AM    NRBC 2 07/20/2022 12:30 AM    METASPCT 1 07/23/2022 10:11 AM    LYMPHOPCT 5 07/23/2022 10:11 AM    MONOPCT 18 07/23/2022 10:11 AM    BASOPCT 0 07/23/2022 10:11 AM    MONOSABS 0.92 07/23/2022 10:11 AM    LYMPHSABS 0.26 07/23/2022 10:11 AM    EOSABS 0.20 07/23/2022 10:11 AM    BASOSABS 0.00 07/23/2022 10:11 AM    DIFFTYPE NOT REPORTED 02/06/2022 10:11 AM     Hemoglobin/Hematocrit:    Lab Results   Component Value Date/Time    HGB 8.1 07/23/2022 10:11 AM    HCT 25.5 07/23/2022 10:11 AM     CMP:    Lab Results   Component Value Date/Time     07/23/2022 10:11 AM    K 4.2 07/23/2022 10:11 AM     07/23/2022 10:11 AM    CO2 27 07/23/2022 10:11 AM    BUN 13 07/23/2022 10:11 AM    CREATININE 1.13 07/23/2022 10:11 AM    GFRAA 57 07/23/2022 10:11 AM    LABGLOM 47 07/23/2022 10:11 AM    GLUCOSE 150 07/23/2022 10:11 AM    PROT 4.2 07/20/2022 12:30 AM    LABALBU 1.9 07/20/2022 12:30 AM    CALCIUM 7.7 07/23/2022 10:11 AM    BILITOT 0.60 07/20/2022 12:30 AM    ALKPHOS 93 07/20/2022 12:30 AM    AST 15 07/20/2022 12:30 AM ALT 7 07/20/2022 12:30 AM     BMP:    Lab Results   Component Value Date/Time     07/23/2022 10:11 AM    K 4.2 07/23/2022 10:11 AM     07/23/2022 10:11 AM    CO2 27 07/23/2022 10:11 AM    BUN 13 07/23/2022 10:11 AM    LABALBU 1.9 07/20/2022 12:30 AM    CREATININE 1.13 07/23/2022 10:11 AM    CALCIUM 7.7 07/23/2022 10:11 AM    GFRAA 57 07/23/2022 10:11 AM    LABGLOM 47 07/23/2022 10:11 AM    GLUCOSE 150 07/23/2022 10:11 AM     PT/INR:    Lab Results   Component Value Date/Time    PROTIME 17.6 07/21/2022 03:45 AM    INR 1.4 07/21/2022 03:45 AM     PTT:    Lab Results   Component Value Date/Time    APTT 29.5 07/21/2022 03:45 AM   [APTT}    Physical Examination:        General appearance: alert, cooperative and no distress  Mental Status: oriented to person, place and time and normal affect  Abdomen: soft, nontender, nondistended, bowel sounds present  Extremities: no edema, redness or tenderness in the calves  Skin: no gross lesions, rashes, or induration    Assessment:        Primary Problem  Gastrointestinal hemorrhage     Active Hospital Problems    Diagnosis Date Noted    Severe malnutrition ([facility] [E43] 07/22/2022     Priority: Medium    Gastrointestinal hemorrhage [K92.2] 07/20/2022     Priority: Medium    Dependence on renal dialysis ([facility] [Z99.2]      Priority: Medium    UTI (urinary tract infection) [N39.0] 02/18/2021    Paroxysmal atrial fibrillation ([facility] [I48.0] 07/28/2020    CKD stage 4 secondary to hypertension ([address] [I12.9, N18.4] 02/20/2020     Past Medical History:   Diagnosis Date    Acute CVA (cerebrovascular accident) ([facility] 07/25/2020    Acute on chronic combined systolic (congestive) and diastolic (congestive) heart failure ([facility] 02/06/2022    Arthritis     Asthma     Bradycardia 06/12/2022    ESRD (end stage renal disease) ([facility]     Essential hypertension 07/25/2020    Hallucinations 02/18/2021    Hard of hearing     Head contusion 09/09/2020    Iron deficiency anemia, unspecified Meningioma ([facility] 02/25/2021    Myocardial infarction [facility])     Pure hypercholesterolemia     Type 2 diabetes mellitus with stage 4 chronic kidney disease, without long-term current use of insulin (HCC)     Unspecified venous (peripheral) insufficiency     Unspecified vitamin D deficiency         Plan:        Anemia with melena, rectal bleeding s/p EGD negative  Positive bleeding scan on 7/20/22  CTA abdomen 7/22/22 no active bleeding  No overt bleeding today  Hgb stable  Full liquid diet  Monitor for bleeding  Trend H&H  Transfuse for hgb < 7  PPI  Hold AC  Outpatient capsule  Time spent reviewing the chart, seeing the patient, and discussing with the attending MD around 30 minutes.       Explained to the patient and d/W Nursing Staff  Will F/U with you  Please call or Page for any issues or change in status  Thanks    Electronically signed by MAL Smith NP on 7/23/2022 at 4:56 PM

## 2022-07-24 PROCEDURE — 99239 HOSP IP/OBS DSCHRG MGMT >30: CPT | Performed by: INTERNAL MEDICINE

## 2022-07-24 PROCEDURE — 6370000000 HC RX 637 (ALT 250 FOR IP): Performed by: INTERNAL MEDICINE

## 2022-07-24 PROCEDURE — 6370000000 HC RX 637 (ALT 250 FOR IP)

## 2022-07-24 PROCEDURE — 94640 AIRWAY INHALATION TREATMENT: CPT

## 2022-07-24 PROCEDURE — 2060000000 HC ICU INTERMEDIATE R&B

## 2022-07-24 PROCEDURE — 99232 SBSQ HOSP IP/OBS MODERATE 35: CPT | Performed by: INTERNAL MEDICINE

## 2022-07-24 PROCEDURE — 6370000000 HC RX 637 (ALT 250 FOR IP): Performed by: NURSE PRACTITIONER

## 2022-07-24 PROCEDURE — 2580000003 HC RX 258: Performed by: INTERNAL MEDICINE

## 2022-07-24 PROCEDURE — 94761 N-INVAS EAR/PLS OXIMETRY MLT: CPT

## 2022-07-24 PROCEDURE — APPSS30 APP SPLIT SHARED TIME 16-30 MINUTES: Performed by: NURSE PRACTITIONER

## 2022-07-24 PROCEDURE — 6360000002 HC RX W HCPCS: Performed by: STUDENT IN AN ORGANIZED HEALTH CARE EDUCATION/TRAINING PROGRAM

## 2022-07-24 PROCEDURE — 97530 THERAPEUTIC ACTIVITIES: CPT

## 2022-07-24 RX ORDER — PANTOPRAZOLE SODIUM 40 MG/1
40 TABLET, DELAYED RELEASE ORAL
Qty: 30 TABLET | Refills: 3 | Status: CANCELLED | OUTPATIENT
Start: 2022-07-25

## 2022-07-24 RX ORDER — CIPROFLOXACIN 500 MG/1
500 TABLET, FILM COATED ORAL 2 TIMES DAILY
Qty: 10 TABLET | Refills: 0 | Status: SHIPPED | OUTPATIENT
Start: 2022-07-24 | End: 2022-07-29

## 2022-07-24 RX ADMIN — PANTOPRAZOLE SODIUM 40 MG: 40 TABLET, DELAYED RELEASE ORAL at 08:41

## 2022-07-24 RX ADMIN — Medication 2 PUFF: at 19:59

## 2022-07-24 RX ADMIN — METOPROLOL TARTRATE 25 MG: 25 TABLET, FILM COATED ORAL at 08:41

## 2022-07-24 RX ADMIN — Medication 2 PUFF: at 07:03

## 2022-07-24 RX ADMIN — SODIUM CHLORIDE, PRESERVATIVE FREE 10 ML: 5 INJECTION INTRAVENOUS at 09:00

## 2022-07-24 RX ADMIN — AMIODARONE HYDROCHLORIDE 100 MG: 100 TABLET ORAL at 08:41

## 2022-07-24 RX ADMIN — TIOTROPIUM BROMIDE INHALATION SPRAY 2 PUFF: 3.12 SPRAY, METERED RESPIRATORY (INHALATION) at 07:03

## 2022-07-24 RX ADMIN — ATORVASTATIN CALCIUM 40 MG: 40 TABLET, FILM COATED ORAL at 20:32

## 2022-07-24 RX ADMIN — ACETAMINOPHEN 325MG 650 MG: 325 TABLET ORAL at 22:16

## 2022-07-24 RX ADMIN — LEVOTHYROXINE SODIUM 25 MCG: 0.03 TABLET ORAL at 08:42

## 2022-07-24 RX ADMIN — CIPROFLOXACIN 400 MG: 2 INJECTION, SOLUTION INTRAVENOUS at 08:45

## 2022-07-24 RX ADMIN — ACETAMINOPHEN 325MG 650 MG: 325 TABLET ORAL at 08:41

## 2022-07-24 RX ADMIN — FUROSEMIDE 80 MG: 40 TABLET ORAL at 08:41

## 2022-07-24 RX ADMIN — QUETIAPINE FUMARATE 25 MG: 25 TABLET ORAL at 20:33

## 2022-07-24 RX ADMIN — METOPROLOL TARTRATE 25 MG: 25 TABLET, FILM COATED ORAL at 20:31

## 2022-07-24 RX ADMIN — SODIUM CHLORIDE, PRESERVATIVE FREE 10 ML: 5 INJECTION INTRAVENOUS at 21:12

## 2022-07-24 ASSESSMENT — PAIN SCALES - GENERAL
PAINLEVEL_OUTOF10: 7
PAINLEVEL_OUTOF10: 4

## 2022-07-24 ASSESSMENT — PAIN DESCRIPTION - LOCATION: LOCATION: HEAD

## 2022-07-24 ASSESSMENT — PAIN DESCRIPTION - DESCRIPTORS: DESCRIPTORS: ACHING

## 2022-07-24 NOTE — PROGRESS NOTES
Patient was seen and examined. Up in the chair. Complaining of rectal pain which is not new. Afebrile vital signs are stable. Tolerating diet. Abdomen is soft. Black tarry stool yesterday. Extremity positive edema. Blood work reviewed. Hemoglobin stable at 8.1. WBC count normal.  Creatinine normal.  BMP unremarkable. Surgically stable for discharge to ECF. Diet as tolerated. Discussed with patient and the nursing staff.

## 2022-07-24 NOTE — PROGRESS NOTES
Physical Therapy  [address]    Date: 22  Patient Name: Justyn Myles       Room: 6668/4379-71  MRN: [id number]   Account: [de-identified]   : 1950  (72 y.o.) Gender: female     Referring Practitioner: Kamille Chris MD  Diagnosis: Gastrointestinal hemorrhage  Past Medical History:  has a past medical history of Acute CVA (cerebrovascular accident) [facility]), Acute on chronic combined systolic (congestive) and diastolic (congestive) heart failure ([address] Arthritis, Asthma, Bradycardia, ESRD (end stage renal disease) ([address] Essential hypertension, Hallucinations, Hard of hearing, Head contusion, Iron deficiency anemia, unspecified, Meningioma ([address] Myocardial infarction ([address] Pure hypercholesterolemia, Type 2 diabetes mellitus with stage 4 chronic kidney disease, without long-term current use of insulin ([address] Unspecified venous (peripheral) insufficiency, and Unspecified vitamin D deficiency. Past Surgical History:   has a past surgical history that includes Tonsillectomy and adenoidectomy; Coronary artery bypass graft; intubation (3/7/2022); Thoracentesis (3/10/2022); Upper gastrointestinal endoscopy (N/A, 3/15/2022); Colonoscopy (N/A, 3/15/2022); IR NONTUNNELED VASCULAR CATHETER > 5 YEARS (2022); IR TUNNELED CVC PLACE WO SQ PORT/PUMP > 5 YEARS (2022); and Upper gastrointestinal endoscopy (N/A, 2022). Overall Orientation Status: Within Functional Limits  Restrictions/Precautions  Restrictions/Precautions: General Precautions; Fall Risk  Required Braces or Orthoses?: Yes (boots)  Implants present? : Metal implants (Cardiac stents)  Required Braces or Orthoses  Right Lower Extremity Brace: Boot  RLE Brace Type: Unaboots  Left Lower Extremity Brace: Boot  LLE Brace Type: Unaboots  Position Activity Restriction  Other position/activity restrictions: up w/ assist    Subjective: Pt laying in bed upon arrival. Becka Albert to get out of bed and into a chairr  Comments: SHERITA Kincaid approved therapy       Pain Assessment: None - Denies Pain                Bed Mobility:   Bed Mobility  Rolling: Contact guard assistance  Supine to Sit: Minimal assistance (with trunk\')  Sit to Supine: Unable to assess  Scooting: Moderate assistance (to EOB)  Comment: HOB flat  Bed mobility  Scooting: Moderate assistance (to EOB)    Transfers:  Sit to Stand: Minimal Assistance  Stand to sit: Minimal Assistance  Bed to Chair: Dependent/Total (sammie steady)      EXERCISES    Other exercises?: Yes  Other exercises 1: bed mobility  Other exercises 2: seated EOB ~ 5 minutes SBA  Other exercises 3: STS x2 with sammie steady  Other exercises 4: stadning tolerance ~30 seconds x2           Activity Tolerance: Patient limited by fatigue, Patient limited by endurance  PT Equipment Recommendations  Equipment Needed:  (TBD)      Current Treatment Recommendations: Strengthening, Patient/Caregiver education & training, Endurance training, Balance training, Functional mobility training, Transfer training, Gait training, Safety education & training, Equipment evaluation, education, & procurement    Conditions Requiring Skilled Therapeutic Intervention  Body Structures, Functions, Activity Limitations Requiring Skilled Therapeutic Intervention: Decreased functional mobility ; Decreased endurance; Increased pain  Treatment Diagnosis: impaired mobility due to weakness and debilitation  History: pt admitted due to GI bleed  Discharge Recommendations: Patient would benefit from continued therapy after discharge     07/24/22 1400   AM-PAC Mobility without Stair Climbing Inpatient    How much difficulty turning over in bed? 3   How much difficulty sitting down on / standing up from a chair with arms? 1   How much difficulty moving from lying on back to sitting on side of bed? 3   How much help from another person moving to and from a bed to a chair? 1   How much help from another person needed to walk in hospital room?  1   AM-PAC Inpatient Mobility without Stair Climbing Raw Score  9   AM-PAC Inpatient without Stair Climbing T-Scale Score  32.44   Mobility Inpatient CMS 0-100% Score 76.07   Mobility Inpatient without Stair CMS G-Code Modifier  CL       Goals  Short Term Goals  Time Frame for Short term goals: 5-7 treatments/ week  Short term goal 1: pt to tolerate 1/2 hour of therapy keeping O2 sats above 90%  Short term goal 2: pt to demonstrate good technique w/ LE strengthenining exercises  Short term goal 3: pt to demonstrate rolling in bed either direction using rail w/ min x 1 for pressure relief  Short term goal 4: pt to advance to and demonstrate transfers supine <> sit w/ min x 2 w/ O2 as ordered  Short term goal 5: pt to advance to and demonstrate fair sitting balance at the EOB and increase sitting tolerance to 10 minutes w/ SBA x 1 w/ O2 as ordered  Additional Goals?: Yes  Short Term Goal 6: pt to advance to and demonstrate transfers sit <> stand and bed <> chair using wheeled walker  w/ min x 2 w/ O2 as ordered  Short Term Goal 7: pt to advance to and demonstrate gait 10-20' using wheeled walker  w/ min x 2 w/ O2 as ordered  Short Term Goal 8: pt to advance to and demonstrate fair or better standing balance using wheeled walker       07/24/22 1413   PT Individual Minutes   Time In 1038   Time Out 1105   Minutes 27       Electronically signed by Grayson Calzada PTA on 7/24/22 at 2:20 PM EDT

## 2022-07-24 NOTE — PROGRESS NOTES
Department of Internal Medicine  Nephrology Mercedes Degroot MD   Consult Note    Reason for consultation: Management of hemodialysis dependent new onset end-stage renal disease. Consulting physician: Claudette Skye, MD.      Subjective/interval history. Patient seen and examined patient is doing better more awake and alert, patient is not in acute acute respiratory distress. Hb stable  Status post EGD no source of bleeding found  Patient had dialysis Friday  Patient had CT abdomen with IV contrast did not show any active GI bleeding      History of present illness: This is a 66 y.o. female with a significant past medical history of Cerebrovascular accident, combined diastolic and systolic heart failure, essential hypertension, partial deafness and chronic kidney disease stage IV [associated with nephrotic range proteinuria [16 g/g], who was admitted to [facility] from 6/27/22 to 7/18/2022 for treatment of hypoxia and bradycardia. She required endotracheal intubation and was extubated on 7/6/2022. Acute hemodialysis was started during that admission for management of worsening azotemia associated with edema and tunneled hemodialysis catheter was eventually placed on 7/8/2022. Patient was on a TTS hemodialysis schedule but was sent to the [facility] where she was supposed to be starting a MWF hemodialysis schedule. Within 24 hours of getting to the [facility], she developed black tarry stools and was sent to the hospital and admitted for GI bleed with hemoglobin 4.9 g/dL yesterday. She has remained relatively hemodynamically stable with systolic blood pressure greater than 100 mmHg. She is completing the first of 2 scheduled units of packed red blood transfusions and is due to be evaluated by GI service. She is hard of hearing but alert and oriented.     Latex, Aleve [naproxen sodium], Pioglitazone, Claritin [loratadine], Keflex [cephalexin], and Lisinopril    Past Medical History:   Diagnosis Date Acute CVA (cerebrovascular accident) ([facility] 07/25/2020    Acute on chronic combined systolic (congestive) and diastolic (congestive) heart failure ([facility] 02/06/2022    Arthritis     Asthma     Bradycardia 06/12/2022    ESRD (end stage renal disease) ([facility]     Essential hypertension 07/25/2020    Hallucinations 02/18/2021    Hard of hearing     Head contusion 09/09/2020    Iron deficiency anemia, unspecified     Meningioma ([facility] 02/25/2021    Myocardial infarction ([facility]     Pure hypercholesterolemia     Type 2 diabetes mellitus with stage 4 chronic kidney disease, without long-term current use of insulin (HCC)     Unspecified venous (peripheral) insufficiency     Unspecified vitamin D deficiency      Scheduled Meds:   pantoprazole  40 mg Oral QAM AC    metoprolol tartrate  25 mg Oral BID    ciprofloxacin  400 mg IntraVENous Q24H    tiotropium  2 puff Inhalation Daily    budesonide-formoterol  2 puff Inhalation BID    hydrocortisone  25 mg Rectal BID    QUEtiapine  25 mg Oral Nightly    levothyroxine  25 mcg Oral Daily    furosemide  80 mg Oral Daily    atorvastatin  40 mg Oral Nightly    amiodarone  100 mg Oral Daily    sodium chloride flush  5-40 mL IntraVENous 2 times per day     Continuous Infusions:   sodium chloride      sodium chloride      sodium chloride      sodium chloride      sodium chloride       PRN Meds:.potassium chloride **OR** potassium chloride, sodium chloride flush, sodium chloride, albuterol sulfate HFA, sodium chloride flush, sodium chloride, ondansetron **OR** ondansetron, acetaminophen **OR** acetaminophen, sodium chloride, sodium chloride, sodium chloride    Family History   Problem Relation Age of Onset    Diabetes Mother     Heart Disease Mother     Heart Disease Father     Diabetes Sister     High Blood Pressure Sister     Diabetes Maternal Grandmother         Social History     Socioeconomic History    Marital status:       Spouse name: None    Number of children: 6    Years of education: None    Highest education level: None   Tobacco Use    Smoking status: Former     Packs/day: 0.25     Years: 10.00     Pack years: 2.50     Types: Cigarettes     Quit date: 2000     Years since quittin.5    Smokeless tobacco: Never   Vaping Use    Vaping Use: Never used   Substance and Sexual Activity    Alcohol use: Not Currently     Alcohol/week: 0.0 standard drinks    Drug use: No    Sexual activity: Yes     Partners: Female     Review of systems: CNS - no headache or dizziness; Cardiac - no chest pain; Respiratory - no shortness of breath; Gastrointestinal - No nausea or vomiting but she has black tarry stools; Musculoskeletal - general body aches; Skin/Integument - no rashes. Physical Exam:    VITALS:  BP (!) 155/71   Pulse (!) 103   Temp 97.5 °F (36.4 °C) (Oral)   Resp 18   Ht 4' 11\" (1.499 m)   Wt 138 lb 0.1 oz (62.6 kg)   SpO2 92%   BMI 27.87 kg/m²   TEMPERATURE:  Current - Temp: 97.5 °F (36.4 °C); Max - Temp  Av.7 °F (36.5 °C)  Min: 97.5 °F (36.4 °C)  Max: 98 °F (36.7 °C)  RESPIRATIONS RANGE: Resp  Av  Min: 16  Max: 20  PULSE RANGE: Pulse  Av.6  Min: 76  Max: 103  BLOOD PRESSURE RANGE:  Systolic (59XJW), GDP:454 , Min:111 , YNS:649 ; Diastolic (62ZVD), BWF:26, Min:50, Max:76  PULSE OXIMETRY RANGE: SpO2  Av.2 %  Min: 92 %  Max: 98 %  24HR INTAKE/OUTPUT:    Intake/Output Summary (Last 24 hours) at 2022 1442  Last data filed at 2022 1308  Gross per 24 hour   Intake 800 ml   Output --   Net 800 ml     Constitutional: alert, appears stated age, and cooperative    Skin: Skin color, texture, turgor normal. No rashes or lesions    Head: Normocephalic, without obvious abnormality, atraumatic     Cardiovascular/Edema: regular rate and rhythm, S1, S2 normal, no murmur, click, rub or gallop    Respiratory: Lungs: clear to auscultation bilaterally    Abdomen: soft, non-tender; bowel sounds normal; no masses,  no organomegaly    Back: symmetric, no curvature. ROM normal. No CVA tenderness. Extremities: extremities normal, atraumatic, no cyanosis or edema    Neuro:  Grossly normal      CBC:   Recent Labs     07/22/22  0437 07/22/22  1029 07/23/22  1011   WBC  --   --  5.1   HGB 8.4* 8.2* 8.1*   PLT  --   --  138*     BMP:    Recent Labs     07/22/22  0437 07/23/22  1011    137   K 2.9* 4.2    103   CO2 29 27   BUN 24* 13   CREATININE 1.25* 1.13*   GLUCOSE 76 150*       Lab Results   Component Value Date/Time    NITRU NEGATIVE 07/20/2022 06:00 PM    COLORU Dark Yellow 07/20/2022 06:00 PM    PHUR 5.5 07/20/2022 06:00 PM    WBCUA 21 TO 50 07/20/2022 06:00 PM    RBCUA 51  07/20/2022 06:00 PM    MUCUS 1+ 06/27/2022 06:35 PM    TRICHOMONAS NOT REPORTED 02/07/2022 02:42 AM    YEAST NOT REPORTED 02/07/2022 02:42 AM    BACTERIA MANY 07/20/2022 06:00 PM    SPECGRAV 1.016 07/20/2022 06:00 PM    LEUKOCYTESUR MOD 07/20/2022 06:00 PM    UROBILINOGEN Normal 07/20/2022 06:00 PM    BILIRUBINUR NEGATIVE 07/20/2022 06:00 PM    GLUCOSEU TRACE 07/20/2022 06:00 PM    KETUA TRACE 07/20/2022 06:00 PM    AMORPHOUS 1+ 07/20/2022 06:00 PM     Urine Sodium:     Lab Results   Component Value Date/Time    SLOAN <20 06/08/2022 09:52 PM     Urine Protein:     Lab Results   Component Value Date/Time     10/27/2020 04:35 PM     Urine Creatinine:     Lab Results   Component Value Date/Time    LABCREA 63.0 06/08/2022 09:52 PM     IMPRESSION/RECOMMENDATIONS:      1.  End-stage renal disease - Secondary to diabetic nephropathy. We will place patient on a Monday/Wednesday/Friday hemodialysis schedule. Patient had dialysis yesterday she will receive acute hemodialysis today as per schedule. MWF    2.  GI bleed -patient scheduled for EGD    3. Systemic hypertension - blood pressure control is adequate. 4.  Severe anemia secondary to GI bleed. Patient however has underlying anemia of chronic kidney disease.     Hypokalemia- replace kcl    Plan  Hemodialysis Monday Wednesday Friday, next dialysis tomorrow  No objection to discharge from nephrology standpoint  Patient is planned to be discharged to [facility]  If patient's dialysis is confirmed at [facility] I have no objection objections on her discharge    Thank you very much for the courtesy and confidence of this consultation.     Chary Higginbotham MD   Attending Nephrologist  7/24/2022 2:42 PM

## 2022-07-24 NOTE — PROGRESS NOTES
Patient was seen and examined. Afebrile vital signs are stable. No new complaints. Tolerating diet. Abdomen is soft nondistended nontender. Extremity positive edema. Blood work reviewed. Repeat labs ordered for tomorrow. Late entry. Continue supportive care.

## 2022-07-24 NOTE — PROGRESS NOTES
Pulmonary Progress Note  [facility]      Patient - Brielle Mcconnell,  Age - 66 y.o.    - 1950      Room Number - 9001/4216-93   N -  [id number]   [facility]  Date of Admission -  2022 11:42 PM        Consulting Keely Coy MD  Primary Care Physician - Kris Lu MD     SUBJECTIVE   Looks comfortable, only on room air (nasal cannula was in her hair)    Previously on home O2 24 hrs daily, but only needs with exertion here. Denies any SOB. Daughter at bedside. OBJECTIVE   VITALS    height is 4' 11\" (1.499 m) and weight is 138 lb 0.1 oz (62.6 kg). Her oral temperature is 97.5 °F (36.4 °C). Her blood pressure is 155/71 (abnormal) and her pulse is 103 (abnormal). Her respiration is 18 and oxygen saturation is 92%. Body mass index is 27.87 kg/m². Temperature Range: Temp: 97.5 °F (36.4 °C) Temp  Av.7 °F (36.5 °C)  Min: 97.5 °F (36.4 °C)  Max: 98 °F (36.7 °C)  BP Range:  Systolic (52GNL), KPX:232 , Min:111 , CAC:121     Diastolic (63UQN), ZDD:33, Min:50, Max:76    Pulse Range: Pulse  Av.6  Min: 76  Max: 103  Respiration Range: Resp  Av  Min: 16  Max: 20  Current Pulse Ox[de-identified]  SpO2: 92 %  24HR Pulse Ox Range:  SpO2  Av.2 %  Min: 92 %  Max: 98 %  Oxygen Amount and Delivery: O2 Flow Rate (L/min): 2 L/min    Wt Readings from Last 3 Encounters:   22 138 lb 0.1 oz (62.6 kg)   22 133 lb 9.6 oz (60.6 kg)   22 152 lb (68.9 kg)       I/O (24 Hours)    Intake/Output Summary (Last 24 hours) at 2022 1458  Last data filed at 2022 1308  Gross per 24 hour   Intake 800 ml   Output --   Net 800 ml         EXAM     General Appearance  Awake, alert, oriented, in no acute distress. VERY hard of hearing. HEENT - normocephalic, atraumatic.  []  Mallampati  [x] Crowded airway   [x] Macroglossia  []  Retrognathia  [] Micrognathia  []  Normal tongue size [] Normal Bite  [] Chelita sign positive    Neck - Supple,  trachea midline IJ line placed  Lungs -poor air movement  Cardiovascular - Heart sounds are normal.  Regular rate and rhythm   Abdomen - Soft, nontender, nondistended, no masses or organomegaly  Neurologic - There are no focal motor or sensory deficits  Skin - No bruising or bleeding  Extremities - No clubbing, cyanosis, edema    MEDS      pantoprazole  40 mg Oral QAM AC    metoprolol tartrate  25 mg Oral BID    ciprofloxacin  400 mg IntraVENous Q24H    tiotropium  2 puff Inhalation Daily    budesonide-formoterol  2 puff Inhalation BID    hydrocortisone  25 mg Rectal BID    QUEtiapine  25 mg Oral Nightly    levothyroxine  25 mcg Oral Daily    furosemide  80 mg Oral Daily    atorvastatin  40 mg Oral Nightly    amiodarone  100 mg Oral Daily    sodium chloride flush  5-40 mL IntraVENous 2 times per day      sodium chloride      sodium chloride      sodium chloride      sodium chloride      sodium chloride       potassium chloride **OR** potassium chloride, sodium chloride flush, sodium chloride, albuterol sulfate HFA, sodium chloride flush, sodium chloride, ondansetron **OR** ondansetron, acetaminophen **OR** acetaminophen, sodium chloride, sodium chloride, sodium chloride    LABS   CBC   Recent Labs     07/23/22  1011   WBC 5.1   HGB 8.1*   HCT 25.5*   MCV 94.7   *       BMP:   Lab Results   Component Value Date/Time     07/23/2022 10:11 AM    K 4.2 07/23/2022 10:11 AM     07/23/2022 10:11 AM    CO2 27 07/23/2022 10:11 AM    BUN 13 07/23/2022 10:11 AM    LABALBU 1.9 07/20/2022 12:30 AM    CREATININE 1.13 07/23/2022 10:11 AM    CALCIUM 7.7 07/23/2022 10:11 AM    GFRAA 57 07/23/2022 10:11 AM    LABGLOM 47 07/23/2022 10:11 AM     ABGs:  Lab Results   Component Value Date/Time    PHART 7.524 07/20/2022 04:08 PM    PO2ART 68.7 07/20/2022 04:08 PM    WFJ4UOY 33.6 07/20/2022 04:08 PM      Lab Results   Component Value Date/Time    MODE PRVC 07/06/2022

## 2022-07-24 NOTE — PROGRESS NOTES
sodium chloride infusion, PRN  ondansetron (ZOFRAN-ODT) disintegrating tablet 4 mg, Q8H PRN   Or  ondansetron (ZOFRAN) injection 4 mg, Q6H PRN  acetaminophen (TYLENOL) tablet 650 mg, Q6H PRN   Or  acetaminophen (TYLENOL) suppository 650 mg, Q6H PRN  0.9 % sodium chloride infusion, PRN  0.9 % sodium chloride infusion, PRN  0.9 % sodium chloride infusion, PRN        Data:     Code Status:  Full Code    Family History   Problem Relation Age of Onset    Diabetes Mother     Heart Disease Mother     Heart Disease Father     Diabetes Sister     High Blood Pressure Sister     Diabetes Maternal Grandmother        Social History     Socioeconomic History    Marital status:       Spouse name: Not on file    Number of children: 6    Years of education: Not on file    Highest education level: Not on file   Occupational History    Not on file   Tobacco Use    Smoking status: Former     Packs/day: 0.25     Years: 10.00     Pack years: 2.50     Types: Cigarettes     Quit date: 2000     Years since quittin.5    Smokeless tobacco: Never   Vaping Use    Vaping Use: Never used   Substance and Sexual Activity    Alcohol use: Not Currently     Alcohol/week: 0.0 standard drinks    Drug use: No    Sexual activity: Yes     Partners: Female   Other Topics Concern    Not on file   Social History Narrative    Not on file     Social Determinants of Health     Financial Resource Strain: Not on file   Food Insecurity: Not on file   Transportation Needs: Not on file   Physical Activity: Not on file   Stress: Not on file   Social Connections: Not on file   Intimate Partner Violence: Not on file   Housing Stability: Not on file       Vitals:  BP (!) 155/71   Pulse (!) 103   Temp 97.5 °F (36.4 °C) (Oral)   Resp 18   Ht 4' 11\" (1.499 m)   Wt 138 lb 0.1 oz (62.6 kg)   SpO2 92%   BMI 27.87 kg/m²   Temp (24hrs), Av.8 °F (36.6 °C), Min:97.5 °F (36.4 °C), Max:98.2 °F (36.8 °C)    Recent Labs     22  1421   POCGLU 99 I/O (24Hr):     Intake/Output Summary (Last 24 hours) at 7/24/2022 1236  Last data filed at 7/24/2022 0826  Gross per 24 hour   Intake 660 ml   Output --   Net 660 ml       Labs:      CBC:   Lab Results   Component Value Date/Time    WBC 5.1 07/23/2022 10:11 AM    RBC 2.69 07/23/2022 10:11 AM    HGB 8.1 07/23/2022 10:11 AM    HCT 25.5 07/23/2022 10:11 AM    MCV 94.7 07/23/2022 10:11 AM    MCH 30.0 07/23/2022 10:11 AM    MCHC 31.7 07/23/2022 10:11 AM    RDW 17.9 07/23/2022 10:11 AM     07/23/2022 10:11 AM    MPV 7.3 07/23/2022 10:11 AM     CBC with Differential:    Lab Results   Component Value Date/Time    WBC 5.1 07/23/2022 10:11 AM    RBC 2.69 07/23/2022 10:11 AM    HGB 8.1 07/23/2022 10:11 AM    HCT 25.5 07/23/2022 10:11 AM     07/23/2022 10:11 AM    MCV 94.7 07/23/2022 10:11 AM    MCH 30.0 07/23/2022 10:11 AM    MCHC 31.7 07/23/2022 10:11 AM    RDW 17.9 07/23/2022 10:11 AM    NRBC 2 07/20/2022 12:30 AM    METASPCT 1 07/23/2022 10:11 AM    LYMPHOPCT 5 07/23/2022 10:11 AM    MONOPCT 18 07/23/2022 10:11 AM    BASOPCT 0 07/23/2022 10:11 AM    MONOSABS 0.92 07/23/2022 10:11 AM    LYMPHSABS 0.26 07/23/2022 10:11 AM    EOSABS 0.20 07/23/2022 10:11 AM    BASOSABS 0.00 07/23/2022 10:11 AM    DIFFTYPE NOT REPORTED 02/06/2022 10:11 AM     Hemoglobin/Hematocrit:    Lab Results   Component Value Date/Time    HGB 8.1 07/23/2022 10:11 AM    HCT 25.5 07/23/2022 10:11 AM     CMP:    Lab Results   Component Value Date/Time     07/23/2022 10:11 AM    K 4.2 07/23/2022 10:11 AM     07/23/2022 10:11 AM    CO2 27 07/23/2022 10:11 AM    BUN 13 07/23/2022 10:11 AM    CREATININE 1.13 07/23/2022 10:11 AM    GFRAA 57 07/23/2022 10:11 AM    LABGLOM 47 07/23/2022 10:11 AM    GLUCOSE 150 07/23/2022 10:11 AM    PROT 4.2 07/20/2022 12:30 AM    LABALBU 1.9 07/20/2022 12:30 AM    CALCIUM 7.7 07/23/2022 10:11 AM    BILITOT 0.60 07/20/2022 12:30 AM    ALKPHOS 93 07/20/2022 12:30 AM    AST 15 07/20/2022 12:30 AM    ALT 7 07/20/2022 12:30 AM     BMP:    Lab Results   Component Value Date/Time     07/23/2022 10:11 AM    K 4.2 07/23/2022 10:11 AM     07/23/2022 10:11 AM    CO2 27 07/23/2022 10:11 AM    BUN 13 07/23/2022 10:11 AM    LABALBU 1.9 07/20/2022 12:30 AM    CREATININE 1.13 07/23/2022 10:11 AM    CALCIUM 7.7 07/23/2022 10:11 AM    GFRAA 57 07/23/2022 10:11 AM    LABGLOM 47 07/23/2022 10:11 AM    GLUCOSE 150 07/23/2022 10:11 AM     PT/INR:    Lab Results   Component Value Date/Time    PROTIME 17.6 07/21/2022 03:45 AM    INR 1.4 07/21/2022 03:45 AM     PTT:    Lab Results   Component Value Date/Time    APTT 29.5 07/21/2022 03:45 AM   [APTT}    Physical Examination:        General appearance: alert, cooperative and no distress  Mental Status: oriented to person, place and time and normal affect  Abdomen: soft, nontender, nondistended, bowel sounds present   Extremities: no edema, redness or tenderness in the calves  Skin: no gross lesions, rashes, or induration    Assessment:        Primary Problem  Gastrointestinal hemorrhage     Active Hospital Problems    Diagnosis Date Noted    Severe malnutrition ([facility] [E43] 07/22/2022     Priority: Medium    Gastrointestinal hemorrhage [K92.2] 07/20/2022     Priority: Medium    Dependence on renal dialysis ([facility] [facility] [Z99.2]      Priority: Medium    UTI (urinary tract infection) [N39.0] 02/18/2021    Paroxysmal atrial fibrillation ([facility] [address] [I48.0] 07/28/2020    CKD stage 4 secondary to hypertension ([address] [I12.9, N18.4] 02/20/2020     Past Medical History:   Diagnosis Date    Acute CVA (cerebrovascular accident) ([facility] [address] 07/25/2020    Acute on chronic combined systolic (congestive) and diastolic (congestive) heart failure ([facility] [address] 02/06/2022    Arthritis     Asthma     Bradycardia 06/12/2022    ESRD (end stage renal disease) ([facility] [address]     Essential hypertension 07/25/2020    Hallucinations 02/18/2021    Hard of hearing     Head contusion 09/09/2020    Iron deficiency anemia, unspecified

## 2022-07-24 NOTE — PROGRESS NOTES
[address]    PROGRESS NOTE             7/24/2022    10:35 AM    Name:   Yvonne He  MRN:     [id number]     Acct:      [de-identified]   Room:   2088/2088-01   Day:  4  Admit Date:  7/19/2022 11:42 PM    PCP:  Madison Hatchet, MD  Code Status:  Full Code    Subjective:     C/C:   Chief Complaint   Patient presents with    Melena     Interval History Status: significantly improved. Patient seen and examined at the bed side, no new acute events overnight except sundowning and being confused. She thought the computer was a mailbox    On my exam patient was oriented by 3    Patient yesterday had a dark stool, mild fresh blood in stool    Notes from nursing staff and Consults had been reviewed, and the overnight progress had been checked with the nursing staff as well. Brief History:     The patient is a 66 y.o.  female, with a history of COPD, ESRD on dialysis MWF and A-fib. Patient has an extensive medical history and was transported to the ED from [address] [place] [address] [address] after being in the SNF for only 1 day. She was admitted to [address] [place] [address] [facility] 6/27-7/18 admitted for encephalopathy, hypoxia requiring mechanical ventilation and bradycardia requiring atropine and transcutaneous pacing. She sustained JOY on top of Stage 4 CKD resulting in hyperkalemia and she is now dialysis dependent. During hospitalization she had multiple episodes of recurrent anemia requiring transfusion of PRBC. Despite recurrent anemia she was discharged to SNF on eliquis. She presented to the ED due to acute rectal bleeding. Daughter is at bedside and reports that when she went to the SNF to see her mother she noticed that the patient was lethargic and more confused. Patient has had dark stools from taking iron but when her brief was changed the aide reported a large amount of bright red blood mixed with stool.  Her symptoms are acute and severe with a 2 puff Inhalation BID    hydrocortisone  25 mg Rectal BID    QUEtiapine  25 mg Oral Nightly    levothyroxine  25 mcg Oral Daily    furosemide  80 mg Oral Daily    atorvastatin  40 mg Oral Nightly    amiodarone  100 mg Oral Daily    sodium chloride flush  5-40 mL IntraVENous 2 times per day     Continuous Infusions:    sodium chloride      sodium chloride      sodium chloride      sodium chloride      sodium chloride       PRN Meds: potassium chloride **OR** potassium chloride, sodium chloride flush, sodium chloride, albuterol sulfate HFA, sodium chloride flush, sodium chloride, ondansetron **OR** ondansetron, acetaminophen **OR** acetaminophen, sodium chloride, sodium chloride, sodium chloride    Data:     Past Medical History:   has a past medical history of Acute CVA (cerebrovascular accident) ([facility] Acute on chronic combined systolic (congestive) and diastolic (congestive) heart failure ([facility] [address] [facility] Arthritis, Asthma, Bradycardia, ESRD (end stage renal disease) ([facility] [address] [facility] Essential hypertension, Hallucinations, Hard of hearing, Head contusion, Iron deficiency anemia, unspecified, Meningioma ([facility] [address] [facility] Myocardial infarction ([facility] [address] Pure hypercholesterolemia, Type 2 diabetes mellitus with stage 4 chronic kidney disease, without long-term current use of insulin ([facility] [address] Unspecified venous (peripheral) insufficiency, and Unspecified vitamin D deficiency. Social History:   reports that she quit smoking about 22 years ago. She has a 2.50 pack-year smoking history. She has never used smokeless tobacco. She reports that she does not currently use alcohol. She reports that she does not use drugs.      Family History:   Family History   Problem Relation Age of Onset    Diabetes Mother     Heart Disease Mother     Heart Disease Father     Diabetes Sister     High Blood Pressure Sister     Diabetes Maternal Grandmother        Vitals:  BP (!) 155/71   Pulse (!) 103   Temp 97.5 °F (36.4 °C) (Oral)   Resp 18   Ht 4' 11\" (1.499 m)   Wt 138 lb 0.1 oz (62.6 kg)   SpO2 92%   BMI 27.87 kg/m²   Temp (24hrs), Av.8 °F (36.6 °C), Min:97.5 °F (36.4 °C), Max:98.2 °F (36.8 °C)      Recent Labs     22  1421   POCGLU 99       I/O(24Hr): Intake/Output Summary (Last 24 hours) at 2022 1035  Last data filed at 2022 3920  Gross per 24 hour   Intake 660 ml   Output --   Net 660 ml       Labs:        Lab Results   Component Value Date/Time    SPECIAL NOT GIVEN 2022 01:11 PM     Lab Results   Component Value Date/Time    CULTURE POSITIVE Blood Culture (A) 2022 08:17 AM    CULTURE  2022 08:17 AM     DIRECT GRAM STAIN FROM BOTTLE: GRAM POSITIVE COCCI IN CLUSTERS    CULTURE  2022 08:17 AM     Detected: Staphylococcus epidermidis Detected: mecA/C Gene Detected- Methicillin Resistant Organism Methodology- Polymerase Chain Reaction (PCR)    CULTURE (A) 2022 08:17 AM     STAPHYLOCOCCUS EPIDERMIDIS A single positive blood culture of coagulase negative Staphylocci, diphtheroids,micrococci, Cutibacterium, viridans Streptocci, Bacillus, or Lactobacillus species should be interpreted with caution and viewed as a likely skin contaminant. CULTURE  2022 08:17 AM     (NOTE) Direct Gram Stain from bottle result called to and read back by: RN [place] ON 22         Radiology:    NM GI BLOOD LOSS    Result Date: 2022  EXAMINATION: NUCLEAR MEDICINE GASTRIC BLEEDING STUDY 2022 TECHNIQUE: Following the intravenous injection of 22.5 mCi of 99mTc-labeled RBCs, a flow study and standard images of the abdomen was obtained over a total period of 60 minutes. COMPARISON: Abdomen and pelvis CT 2020 HISTORY: ORDERING SYSTEM PROVIDED HISTORY: rectal bleeding TECHNOLOGIST PROVIDED HISTORY: rectal bleeding Reason for Exam: rectal bleeding FINDINGS: Activity within the blood pool, including the great vessels, heart, liver, and spleen.   Accumulation of a small amount of activity in the urinary bladder over the course of the study. Abnormal bowel activity beginning by approximately 13 minutes, appearing to extend initially to left and slightly superiorly before extending to the right and inferiorly at the level of the aortic bifurcation. Active gastrointestinal bleeding during study acquisition. The source is not definitely localized with considerations including the 3rd or 4th segment of the duodenum or the mid to distal transverse colon. Consider further evaluation with CT angiography for better localization. RECOMMENDATIONS: If the patient shows hemodynamic signs of an active bleed in the next 20 hours, additional images can be acquired. XR CHEST PORTABLE    Result Date: 7/20/2022  EXAMINATION: ONE XRAY VIEW OF THE CHEST 7/20/2022 6:03 pm COMPARISON: Chest x-ray dated 20 July 2022, 1626 hours HISTORY: ORDERING SYSTEM PROVIDED HISTORY: central line place TECHNOLOGIST PROVIDED HISTORY: central line place Reason for Exam: Central line placement. Due to pt. condition, pt. unable to raise chin FINDINGS: Right-sided central venous catheter with the tip in the superior vena cava. There is a left-sided IJ catheter with the tip in the superior vena cava. Moderate right and small left pleural effusions. Bibasilar airspace opacities. No pneumothorax. Stable cardiomediastinal silhouette     1. Interval placement of a left-sided IJ catheter with the tip in the superior vena cava. 2.  Moderate right and small left pleural effusions with bibasilar airspace opacities     XR CHEST PORTABLE    Result Date: 7/20/2022  EXAMINATION: ONE XRAY VIEW OF THE CHEST 7/20/2022 4:15 pm COMPARISON: Chest x-ray dated 19 July 2022 HISTORY: ORDERING SYSTEM PROVIDED HISTORY: Follow up, respiratory distress TECHNOLOGIST PROVIDED HISTORY: Follow up, respiratory distress Reason for Exam: Follow up, respiratory distress FINDINGS: right-sided central venous catheter with the tip in the SVC.   Moderate right pleural effusion with right basilar bowel obstruction. Fatty atrophy of the pancreas. Stable 1.5 cm stone in the mid to lower right kidney. No hydronephrosis. Stable benign right adrenal nodule which could represent an adenoma or myelolipoma; no follow-up imaging recommended. Remaining solid organs and the gallbladder are unremarkable. No ascites or significant lymphadenopathy. Severe atherosclerotic disease of the abdominal aorta without aneurysm. Major mesenteric arteries are patent. Moderate stenosis of the proximal celiac artery and mild stenosis of the proximal SMA. Moderate stenosis of the proximal ALLY. No significant renal artery stenosis. CTA PELVIS: Iliac arterial system is patent bilaterally with mild-moderate stenosis involving the common iliac arteries. Bladder and uterus are unremarkable. No lymphadenopathy or free fluid. Mild anasarca. Visualized osseous structures are unremarkable. 1. No evidence of active GI bleeding. 2. Severe atherosclerotic disease with arterial stenosis as described above. 3. Stable right nephrolithiasis. 4. Mild cardiomegaly with right greater than left pleural effusions and adjacent atelectasis. Physical Examination:        PHYSICAL EXAM:  General Appearance  Alert , awake, oriented by 3  Psych: Normal mood and affect  HEENT - Head is normocephalic, atraumatic. Neck: supple, no rigidity, normal ROM, no neck swellings, normal thyroid gland  Lungs - Bilateral equal air entry no wheezes or rhonchi  Cardiovascular - Heart sounds are normal.  A. fib, normal rate without murmur, gallop or rub.   Abdomen - Soft, nontender, nondistended, no masses or organomegaly  Neurologic - There are no new focal motor or sensory deficits  Skin - No bruising or bleeding on exposed skin area  Extremities -dystrophic: Toe nails    Assessment:        Primary Problem  Gastrointestinal hemorrhage    Active Hospital Problems    Diagnosis Date Noted    Severe malnutrition ([facility] [address] [E43] 07/22/2022     Priority: Medium Gastrointestinal hemorrhage [K92.2] 07/20/2022     Priority: Medium    Dependence on renal dialysis [facility]) [Z99.2]      Priority: Medium    UTI (urinary tract infection) [N39.0] 02/18/2021    Paroxysmal atrial fibrillation ([facility] [address] [I48.0] 07/28/2020    CKD stage 4 secondary to hypertension ([facility] [address] [I12.9, N18.4] 02/20/2020       Plan:      Possible discharge for today    Lower GI bleed(Melena)      -GI consulted, general surgery consulted  Central line was insertedat t left internal jugular vein  - Patient still has dark stools  - Patient significantly improved today  -Potassium at 4.2, replace per nephrology   -Technetium scan was done, active GI bleeding cannot be evaluated. CTA/EGD could not find source of bleeding/capsule as an outpatient for GI  - Urinalysis had hemoglobin on 7/22     -Patient presented with Hb 4.9. Received 4 pRBC for now. 2 fresh frozen plasma. Hemoglobin currently stable     -IV Protonix bolus was given with continuous infusion  -Anticoagulation/Antiplatelets Held  -Clear liquid diet     UTI [Gram negative rods, coli]  Ciprofloxacin IV to oral for 7 days        Atrial Fibrillation  -Patient had Atrial Fibrillation at ED  -Hold eliquis [due to bleeding]  -Continue to metoprolol injection 2.5 Q6H  -Restarted Amiodarone      ESRD  -Nephrology on board  -Patient got confused more during dialysis on 22nd, dialysis was stopped at that moment  -On Wednesday/Friday/Monday dialysis schedule.      Hypothyroidism  - Restarted  Levothyroxine        DVT prophylaxis: reason for no prophylaxis: GI bleed  GI prophylaxis: Protonix 40 mg daily    Amrit Crane MD  7/24/2022  10:35 AM

## 2022-07-24 NOTE — PROGRESS NOTES
Pt's daughter Shanae Morales called to receive update on Pt. All questions and concerns were answered.

## 2022-07-24 NOTE — CARE COORDINATION
DISCHARGE PLANNING NOTE:    Writer reviewed LSW notes, and discharge plan is [facility] at [place]. Does NOT require authorization to return. Received call from daughter, Tanmay Newman, stating she does not feel pt is ready for discharge. She is concerned that pt is supposed to get HD at [facility] tomorrow and that she will not be on the schedule d/t her being here in the hospital. Notified primary care RN of this. Electronically signed by Tomasa Armstrong RN on 7/24/2022 at 10:36 AM    Writer called [facility] and spoke with \"Monica. \"  She states there is \"no one here in admissions today, and there is no back up either. \"  Marita Cardona states pt can be discharged back to [facility] today without someone from Admissions being there. Called pt's daughter, Tanmay Newman, and notified her of this. Electronically signed by Tomasa Armstrong RN on 7/24/2022 at 10:40 AM    Notified primary care RN, Cheyanne Lion, and clin lead, Sue, of the above. Left message for LSW to look into this tomorrow morning.     Electronically signed by Tomasa Armstrong RN on 7/24/2022 at 11:22 AM

## 2022-07-24 NOTE — FLOWSHEET NOTE
SC visit with patient and her son Salma Delgado; patient resting but welcomed prayer;     07/24/22 1050   Encounter Summary   Encounter Overview/Reason  Spiritual/Emotional Needs   Service Provided For: Patient and family together   Referral/Consult From: Sruthi Jalloh   Last Encounter  07/24/22   Complexity of Encounter Low   Spiritual/Emotional needs   Type Spiritual Support   Palliative Care   Type Palliative Care, Follow-up   Assessment/Intervention/Outcome   Assessment Coping; Hopeful   Intervention Discussed illness injury and its impact;Prayer (assurance of)/[place];Sustaining Presence/Ministry of presence   Outcome Coping;Engaged in conversation;Expressed Gratitude;Receptive

## 2022-07-24 NOTE — DISCHARGE SUMMARY
[address]    Discharge Summary     Patient ID: Hayley Salcido  :  [id number]   MRN: [id number]     ACCOUNT:  [de-identified]   Patient's PCP: Marvel Kenney MD  Admit Date: 2022   Discharge Date: 2022     Length of Stay: 4  Code Status:  Full Code  Admitting Physician: Merced Hernandez MD  Discharge Physician: Rafiq James MD     Active Discharge Diagnoses:       Primary Problem  Gastrointestinal hemorrhage      [place] Problems    Diagnosis Date Noted    Severe malnutrition ([facility] [E43] 2022     Priority: Medium    Gastrointestinal hemorrhage [K92.2] 2022     Priority: Medium    Dependence on renal dialysis [facility]) [Z99.2]      Priority: Medium    UTI (urinary tract infection) [N39.0] 2021    Paroxysmal atrial fibrillation ([facility] [I48.0] 2020    CKD stage 4 secondary to hypertension ([facility] [I12.9, N18.4] 2020       Admission Condition:  serious     Discharged Condition: good    Hospital Stay:       Hospital Course:  Hayley Salcido is a 66 y.o. female who was admitted for the management of   acute bleeding per rectum. Patient presented to the ED as being transferred from [facility] after being there for 1 day only. Patient was previous at [facility] where she had complicated stay      Patient presented with hemoglobin of 4.9, received 2 packed RBCs in the ED and GI was immediately consulted. Patient mental status and confusion slightly improved at that point. Patient continued to have drop in hemoglobin through her stay, she then received 2 packed RBCs in the ICU, and 2 fresh frozen plasma. During her stay patient returned to baseline and became oriented by 3 and alert. Patient also had a UTI and currently being treated with ciprofloxacin. During the stay Ms. Ojeda, had a technetium scan which showed bleeding in the third fourth part of the sternum versus transverse colon. Patient then received an EGD that did not show a source of bleeding, but by CTA abdomen pelvis that did not identify the bleeding. Patient will have capsule endoscopy as an outpatient on discharge        Significant therapeutic interventions: PRBC transfusion * 4 and 2 FFP    Significant Diagnostic Studies: EGD, Technetium scan, CTA abdomen/pelvis. Labs / Micro:      Radiology:    XR CHEST (SINGLE VIEW FRONTAL)    Result Date: 7/14/2022  EXAMINATION: ONE XRAY VIEW OF THE CHEST 7/14/2022 2:44 pm COMPARISON: Chest x-ray dated 10 July 2022 HISTORY: ORDERING SYSTEM PROVIDED HISTORY: screening TECHNOLOGIST PROVIDED HISTORY: screening FINDINGS: Stable right-sided dialysis catheter. Stable cardiomegaly with bilateral pleural effusions and bibasilar airspace opacities. Stable findings of CHF. CT HEAD WO CONTRAST    Result Date: 6/27/2022  EXAMINATION: CT OF THE HEAD WITHOUT CONTRAST  6/27/2022 10:20 am TECHNIQUE: CT of the head was performed without the administration of intravenous contrast. Automated exposure control, iterative reconstruction, and/or weight based adjustment of the mA/kV was utilized to reduce the radiation dose to as low as reasonably achievable. COMPARISON: Head CT 03/07/2022, 02/17/2021 HISTORY: ORDERING SYSTEM PROVIDED HISTORY: AMS TECHNOLOGIST PROVIDED HISTORY: AMS Decision Support Exception - unselect if not a suspected or confirmed emergency medical condition->Emergency Medical Condition (MA) Reason for Exam: ams FINDINGS: BRAIN/VENTRICLES: Examination is mildly motion degraded. There is no acute intracranial hemorrhage or midline shift. No abnormal extra-axial fluid collection with an unchanged 2.5 cm transverse by 2.6 cm AP extra-axial mass in the anterior right temporal region compatible with a meningioma with some peripheral rim mineralization. There is mass effect on the anterior right temporal lobe with suggestion of increased surrounding edema.   Partial empty sella, unchanged from prior. Encephalomalacia from small remote insults in the right caudate and left basal ganglia region. The gray-white differentiation is otherwise maintained. Parenchymal atrophy which is greatest in frontal lobes. There is no evidence of hydrocephalus. ORBITS: The visualized portion of the orbits demonstrate no acute abnormality. SINUSES: The visualized paranasal sinuses and mastoid air cells demonstrate no acute abnormality. SOFT TISSUES/SKULL:  No acute abnormality of the visualized skull or soft tissues. No acute intracranial hemorrhage or hydrocephalus. Background mild chronic small vessel white matter changes with remote lacunar insults in the right caudate and left basal ganglia, unchanged from prior. 2.6 cm anterior right temporal meningioma with suggested mild increase in the vasogenic edema along the anterior right temporal lobe relative to 4 months prior. This could be further assessed with a contrast-enhanced brain MRI. NM GI BLOOD LOSS    Result Date: 7/20/2022  EXAMINATION: NUCLEAR MEDICINE GASTRIC BLEEDING STUDY 7/20/2022 TECHNIQUE: Following the intravenous injection of 22.5 mCi of 99mTc-labeled RBCs, a flow study and standard images of the abdomen was obtained over a total period of 60 minutes. COMPARISON: Abdomen and pelvis CT 05/25/2020 HISTORY: ORDERING SYSTEM PROVIDED HISTORY: rectal bleeding TECHNOLOGIST PROVIDED HISTORY: rectal bleeding Reason for Exam: rectal bleeding FINDINGS: Activity within the blood pool, including the great vessels, heart, liver, and spleen. Accumulation of a small amount of activity in the urinary bladder over the course of the study. Abnormal bowel activity beginning by approximately 13 minutes, appearing to extend initially to left and slightly superiorly before extending to the right and inferiorly at the level of the aortic bifurcation. Active gastrointestinal bleeding during study acquisition.   The source is not definitely localized with considerations including the 3rd or 4th segment of the duodenum or the mid to distal transverse colon. Consider further evaluation with CT angiography for better localization. RECOMMENDATIONS: If the patient shows hemodynamic signs of an active bleed in the next 20 hours, additional images can be acquired. US RENAL COMPLETE    Result Date: 6/28/2022  EXAMINATION: RETROPERITONEAL ULTRASOUND OF THE KIDNEYS AND URINARY BLADDER 6/28/2022 COMPARISON: Ultrasound on 10/20/2020 HISTORY: Acute on chronic renal disease. FINDINGS: Kidneys: The right kidney measures 8.9 cm in length and the left kidney measures 11.6 cm in length. Kidneys demonstrate increased cortical echogenicity. No hydronephrosis. 1.4 cm right renal stone. Small renal cysts measure up to 1.3 cm. Bladder: Decompressed by a Stanford catheter. Chronic renal disease. Small renal cysts. 1.4 cm right renal stone. No hydronephrosis. XR CHEST PORTABLE    Result Date: 7/20/2022  EXAMINATION: ONE XRAY VIEW OF THE CHEST 7/20/2022 6:03 pm COMPARISON: Chest x-ray dated 20 July 2022, 1626 hours HISTORY: ORDERING SYSTEM PROVIDED HISTORY: central line place TECHNOLOGIST PROVIDED HISTORY: central line place Reason for Exam: Central line placement. Due to pt. condition, pt. unable to raise chin FINDINGS: Right-sided central venous catheter with the tip in the superior vena cava. There is a left-sided IJ catheter with the tip in the superior vena cava. Moderate right and small left pleural effusions. Bibasilar airspace opacities. No pneumothorax. Stable cardiomediastinal silhouette     1. Interval placement of a left-sided IJ catheter with the tip in the superior vena cava.  2.  Moderate right and small left pleural effusions with bibasilar airspace opacities     XR CHEST PORTABLE    Result Date: 7/20/2022  EXAMINATION: ONE XRAY VIEW OF THE CHEST 7/20/2022 4:15 pm COMPARISON: Chest x-ray dated 19 July 2022 HISTORY: [address] [place] [address] HISTORY: Follow up, respiratory distress TECHNOLOGIST PROVIDED HISTORY: Follow up, respiratory distress Reason for Exam: Follow up, respiratory distress FINDINGS: right-sided central venous catheter with the tip in the SVC. Moderate right pleural effusion with right basilar airspace opacities appear stable. No pneumothorax. Stable cardiomegaly     Stable exam with moderate right-sided pleural effusion with right basilar airspace disease. XR CHEST PORTABLE    Result Date: 7/20/2022  EXAMINATION: ONE XRAY VIEW OF THE CHEST 7/19/2022 11:57 pm COMPARISON: Prior studies including 06/30/2022 and 07/14/2022 HISTORY: ORDERING SYSTEM PROVIDED HISTORY: pain TECHNOLOGIST PROVIDED HISTORY: pain Reason for Exam: CP and melena X several days FINDINGS: Pulmonary edema has improved from 07/14/2022. There is a persistent moderate right pleural effusion with right basilar consolidation. The heart is enlarged and unchanged. Prior sternal splitting procedure. A right IJ tunneled dialysis catheter remains in good position. Pulmonary edema has improved. Persistent moderate-sized right pleural effusion with basilar consolidation. XR CHEST PORTABLE    Result Date: 7/11/2022  EXAMINATION: ONE X-RAY VIEW OF THE CHEST 7/10/2022 2:21 pm COMPARISON: 07/03/2022 HISTORY: ORDERING SYSTEM PROVIDED HISTORY:  F/U CHF TECHNOLOGIST PROVIDED HISTORY: F/U CHF FINDINGS: Right-sided central venous catheter tip overlies the superior cavoatrial junction. The heart is similar in size to the prior study. There are bilateral pleural effusions and interstitial/airspace opacities. No pneumothorax is seen. Findings are compatible with pulmonary edema. Bilateral pleural effusions. XR CHEST PORTABLE    Result Date: 7/3/2022  EXAMINATION: ONE XRAY VIEW OF THE CHEST 7/3/2022 8:03 am COMPARISON: 06/30/2022.  HISTORY: ORDERING SYSTEM PROVIDED HISTORY: ?fluid overload TECHNOLOGIST PROVIDED HISTORY: ?fluid overload Reason for Exam: port supine at 715am FINDINGS: The cardiomediastinal silhouette is stable status post median sternotomy. There is increased perihilar edema with persistent bilateral pleural effusions and bibasilar volume loss. No pneumothorax. Endotracheal tube tip approximately 3 cm above the kumar. Enteric catheter and right IJ catheter are in place. Mild increased perihilar edema, mild increased perihilar opacification, likely edema and CHF. Persistent small effusions and bibasilar atelectasis. Satisfactory position of endotracheal tube. XR CHEST PORTABLE    Result Date: 6/30/2022  EXAMINATION: ONE XRAY VIEW OF THE CHEST 6/30/2022 2:31 pm COMPARISON: June 29, 2022 chest examination HISTORY: ORDERING SYSTEM PROVIDED HISTORY: respiratory failure intubated TECHNOLOGIST PROVIDED HISTORY: respiratory failure intubated FINDINGS: Median sternotomy. Stable satisfactorily positioned endotracheal/nasogastric tubes and right IJ catheter Limited rotated exam Stable cardiac silhouette/mild tortuosity of the thoracic aorta Interval increase in now moderate diffuse bilateral interstitial infiltrates with persistent moderate bilateral pleural effusions     Stable support lines Slight progression in now moderate diffuse bilateral interstitial infiltrates related to increasing edema and/or infection with moderate bilateral pleural effusions     XR CHEST PORTABLE    Result Date: 6/29/2022  EXAMINATION: ONE XRAY VIEW OF THE CHEST 6/29/2022 7:19 am COMPARISON: Chest radiograph performed 06/28/2022. HISTORY: ORDERING SYSTEM PROVIDED HISTORY: Respiratory failure needing intubation TECHNOLOGIST PROVIDED HISTORY: Respiratory failure needing intubation FINDINGS: There are bilateral effusions. There are scattered infiltrates. There is no pneumothorax. The mediastinal structures are unremarkable. The upper abdomen unremarkable. The extrathoracic soft tissues are unremarkable. There is endotracheal tube with tip in the midtrachea.   There is a gastric tube and the tip is not well visualized. There is a right internal jugular line. Scattered infiltrates with bilateral effusions. Support tubes as described above. XR CHEST PORTABLE    Result Date: 6/28/2022  EXAMINATION: ONE XRAY VIEW OF THE CHEST 6/28/2022 4:07 pm COMPARISON: Chest x-ray dated 28 June 2022, 0906 hours. HISTORY: ORDERING SYSTEM PROVIDED HISTORY: intubation TECHNOLOGIST PROVIDED HISTORY: intubation FINDINGS: There is an endotracheal tube with the tip just entering the right mainstem bronchus. There is an enteric tube with the tip and side port below the diaphragm with the tip below the field of view. There is right lower lobe airspace consolidation. Small bilateral pleural effusions. No pneumothorax. Stable cardiomediastinal silhouette     1. Endotracheal tube with the tip just entering the right mainstem bronchus. Recommend retraction by 4 cm. 2.  Right lower lobe airspace consolidation and small bilateral pleural effusions.  These findings were discussed with the inpatient nurse Vera Fulton at 1637 hours on 28 June 2022     XR CHEST PORTABLE    Result Date: 6/28/2022  EXAMINATION: ONE XRAY VIEW OF THE CHEST 6/28/2022 9:26 am COMPARISON: June 27, 2022 chest exam HISTORY: ORDERING SYSTEM PROVIDED HISTORY: pulm congestion TECHNOLOGIST PROVIDED HISTORY: pulm congestion Reason for Exam: pulmanary congestion port upr at 905am FINDINGS: Median sternotomy/stable cardiomegaly Slight improvement in still moderate diffuse bilateral interstitial infiltrates with interval decrease in still moderate right and small to moderate left pleural effusions     Slight improvement in still moderate diffuse bilateral interstitial infiltrates related to improving pulmonary edema with decreasing still moderate right and mild to moderate left pleural effusions     XR CHEST PORTABLE    Result Date: 6/27/2022  EXAMINATION: ONE XRAY VIEW OF THE CHEST 6/27/2022 9:57 am COMPARISON: 06/12/2022, 06/07/2022, 05/18/2022 HISTORY: ORDERING SYSTEM PROVIDED HISTORY: Bradycardia TECHNOLOGIST PROVIDED HISTORY: Bradycardia FINDINGS: There are mixed interstitial and alveolar opacities throughout both lungs with bilateral pleural effusions. Cardiomegaly with prior CABG. Diffuse osseous demineralization which limits radiographic assessment of the bones. Mixed interstitial and alveolar opacities throughout both lungs, increased from 3 weeks prior. Correlate for edema or contributory infection. Bilateral small left greater than right pleural effusions which are also increased from 3 weeks prior. Unchanged cardiomegaly. IR TUNNELED CVC PLACE WO SQ PORT/PUMP > 5 YEARS    Result Date: 7/8/2022  PROCEDURE: FLUOROSCOPY GUIDED CONVERSION OF A TEMPORARY DIALYSIS CATHETER TO TUNNELED DIALYSIS CATHETER. 7/8/2022. HISTORY: ORDERING SYSTEM PROVIDED HISTORY: hemodialysis access TECHNOLOGIST PROVIDED HISTORY: hemodialysis access How many lumens are being requested?->2 What side should this line be placed? ->Either What site is the preferred site? ->Internal Jugular ACUTE KIDNEY INJURY SEDATION: 50 mcg fentanyl were titrated intravenously for pain control monitored under my direction. Total intraservice time of sedation was 15 minutes. The patient's vital signs were monitored throughout the procedure and recorded in the patient's medical record by the nurse. FLUOROSCOPY DOSE AND TYPE OR TIME AND EXPOSURES: Fluoro time 0.2 minutes DAP 51 cGy cm 2 TECHNIQUE: This procedure was performed by Clifford Jain PA-C under direct supervision of Dr. Ruma Mascorro. Informed consent was obtained after a detailed explanation of the procedure including risks, benefits, and alternatives. Universal protocol was observed. The right neck and chest were prepped and draped in standard sterile fashion using maximum sterile barrier technique.  A 035 guidewire was inserted through the right internal jugular vein temporary dialysis catheter and under fluoroscopic guidance was removed over the wire. The tract was serially dilated to allow the peel away vascular sheath. After localizing the right upper chest with 1% lidocaine a 2nd incision was made. A 14.5 Sudanese by 19 cm tip to cuff dual-lumen tunneled hemodialysis catheter was inserted through the tract and out of the venotomy site of the previous temporary dialysis catheter. The dialysis catheter was inserted through the sheath with tip terminating in the right atrium. Both ports flushed and aspirated appropriately and filled with heparinized saline. The catheter was sutured to the skin and dressed appropriately. Estimated blood loss was 5 mL. The patient tolerated the procedure well and left the department stable condition. FINDINGS: Fluoroscopic image demonstrates the tip of the catheter in the right atrium. Successful ultrasound and fluoroscopy guided tunneled catheter placement . Okay to use. CTA ABDOMEN PELVIS W CONTRAST    Result Date: 7/22/2022  EXAMINATION: CTA OF THE ABDOMEN AND PELVIS WITH CONTRAST 7/22/2022 9:30 am: TECHNIQUE: CTA of the abdomen and pelvis was performed without and with the administration of 100 mL Isovue 370 intravenous contrast. Multiplanar reformatted images are provided for review. MIP images are provided for review. 3D reconstructions were performed on independent workstation. This automated exposure control, iterative reconstruction, and/or weight based adjustment of the mA/kV was utilized to reduce the radiation dose to as low as reasonably achievable. COMPARISON: GI bleeding scan from 2 days ago, CT on 05/25/2021 HISTORY: Acute GI bleeding. FINDINGS: Post CABG changes. Mild cardiomegaly. Moderate right pleural effusion with adjacent compressive atelectasis. Mild-moderate left pleural effusion with adjacent compressive atelectasis. CTA ABDOMEN: No hyperdensity is seen within the gastrointestinal tract on noncontrast images or after contrast administration.   No acute inflammation of the gastrointestinal tract. No bowel obstruction. Fatty atrophy of the pancreas. Stable 1.5 cm stone in the mid to lower right kidney. No hydronephrosis. Stable benign right adrenal nodule which could represent an adenoma or myelolipoma; no follow-up imaging recommended. Remaining solid organs and the gallbladder are unremarkable. No ascites or significant lymphadenopathy. Severe atherosclerotic disease of the abdominal aorta without aneurysm. Major mesenteric arteries are patent. Moderate stenosis of the proximal celiac artery and mild stenosis of the proximal SMA. Moderate stenosis of the proximal ALLY. No significant renal artery stenosis. CTA PELVIS: Iliac arterial system is patent bilaterally with mild-moderate stenosis involving the common iliac arteries. Bladder and uterus are unremarkable. No lymphadenopathy or free fluid. Mild anasarca. Visualized osseous structures are unremarkable. 1. No evidence of active GI bleeding. 2. Severe atherosclerotic disease with arterial stenosis as described above. 3. Stable right nephrolithiasis. 4. Mild cardiomegaly with right greater than left pleural effusions and adjacent atelectasis. XR ABDOMEN FOR NG/OG/NE TUBE PLACEMENT    Result Date: 6/28/2022  EXAMINATION: ONE SUPINE XRAY VIEW(S) OF THE ABDOMEN 6/28/2022 4:07 pm COMPARISON: None. HISTORY: ORDERING SYSTEM PROVIDED HISTORY: og tube placement TECHNOLOGIST PROVIDED HISTORY: og tube placement Portable? ->Yes FINDINGS: There are moderate bibasilar opacities there is a nasogastric tube present with tube side port projected at the junction of the gastric fundus/proximal gastric body. The NG tube tip is projected at the mid gastric body     NG tube position, as detailed     IR NONTUNNELED VASCULAR CATHETER > 5 YEARS    Result Date: 6/30/2022  PROCEDURE: ULTRASOUND GUIDED VASCULAR ACCESS. FLUOROSCOPY GUIDED PLACEMENT OF A NON-TUNNELED CATHETER. 6/28/2022.  HISTORY: ORDERING SYSTEM PROVIDED HISTORY: temp cath TECHNOLOGIST PROVIDED HISTORY: temp cath Acute kidney injury SEDATION: None FLUOROSCOPY DOSE AND TYPE OR TIME AND EXPOSURES: Fluoro time 2.00 minutes  cGy cm 2 TECHNIQUE: This procedure was performed by Clifford Jain PA-C under indirect supervision of Dr. Ruma Mascorro. Informed consent was obtained after a detailed explanation of the procedure including risks, benefits, and alternatives. Universal protocol was observed using maximum sterile barrier technique. Using ultrasound guidance, after anesthetizing the skin with one percent lidocaine, a micropuncture needle was advanced into the patent right internal jugular vein. An ultrasound image demonstrating patency of the vein with needle tip located within it was obtained and stored in PACs. A microwire was inserted under fluoroscopic guidance and the needle was removed and a 5 Danish sheath was placed. The microwire was exchanged for an 035 wire and the tract was serially dilated to accommodate a 13.5 [place] by 15 cm non tunneled duo split hemodialysis catheter. An image was saved demonstrating the catheter tip in the right atrium. The catheter flushed and aspirated appropriately. This was sutured to the skin using 2-0 Ethilon and dressed appropriately. Estimated blood loss was 5 mL. The patient tolerated the procedure well and left the Department stable condition. FINDINGS: Fluoroscopic image demonstrates the tip of the catheter in the right atrium. Successful ultrasound and fluoroscopy guided 13.5 Danish by 15 cm non tunneled hemodialysis catheter placement. Ready for use. FL MODIFIED BARIUM SWALLOW W VIDEO    Result Date: 7/10/2022  EXAMINATION: MODIFIED BARIUM SWALLOW WAS PERFORMED IN CONJUNCTION WITH SPEECH PATHOLOGY SERVICES TECHNIQUE: Fluoroscopic evaluation of the swallowing mechanism was performed using cineradiography with multiple consistency of barium product in conjunction with speech pathology services.  FLUOROSCOPY DOSE AND TYPE OR TIME AND EXPOSURES: Fluoro time: 2 minutes Dose: 13.644 mGy COMPARISON: None HISTORY: ORDERING SYSTEM PROVIDED HISTORY: concern for aspiration TECHNOLOGIST PROVIDED HISTORY: concern for aspiration 71-year-old female with possible aspiration FINDINGS: With the thin liquid substance by straw, there was trace flash penetration without aspiration. With the thick liquid substance by teaspoon and straw, pureed/pudding thick, soft solid and cookie solid substances, there was no penetration or aspiration. 1. Trace flash penetration without aspiration with the thin liquid substance by straw. 2. No penetration or aspiration with the remainder of the administered substances. Please see separate speech pathology report for full discussion of findings and recommendations. Consultations:    Consults:     Final Specialist Recommendations/Findings:   IP CONSULT TO INTERNAL MEDICINE  IP CONSULT TO GI  IP CONSULT TO NEPHROLOGY  IP CONSULT TO PALLIATIVE CARE  IP CONSULT TO PULMONOLOGY  IP CONSULT TO GENERAL SURGERY  IP CONSULT TO INTERVENTIONAL RADIOLOGY  IP CONSULT TO CARDIOLOGY      The patient was seen and examined on day of discharge and this discharge summary is in conjunction with any daily progress note from day of discharge. Discharge plan:       Disposition: To a non-[facility] facility    Physician Follow Up:   GI/ need capsule endoscopy. No follow-up provider specified. Requiring Further Evaluation/Follow Up POST HOSPITALIZATION/Incidental Findings: UTI. Diet: clear liquids, advance as tolerated    Activity: As tolerated    Instructions to Patient: Watch for signs/symptoms of bleeding. Patient need Capsulae endoscope as Outpatient    Discharge Medications:      Medication List        START taking these medications      ciprofloxacin 500 MG tablet  Commonly known as: CIPRO  Take 1 tablet by mouth in the morning and 1 tablet before bedtime. Do all this for 5 days.      tiotropium 2.5 MCG/ACT Aers inhaler  Commonly known as: SPIRIVA RESPIMAT  Inhale 2 puffs into the lungs every morning (before breakfast)            CONTINUE taking these medications      Adjust Fold Cane/York Handle Misc  Use daily for walking     albuterol sulfate  (90 Base) MCG/ACT inhaler  Commonly known as: ProAir HFA  Inhale 2 puffs into the lungs every 6 hours as needed for Wheezing or Shortness of Breath (cough)     amiodarone 100 MG tablet  Commonly known as: PACERONE  Take 1 tablet by mouth daily     apixaban 5 MG Tabs tablet  Commonly known as: ELIQUIS  Take 1 tablet by mouth 2 times daily     ascorbic acid 500 MG tablet  Commonly known as: VITAMIN C  Take 1 tablet by mouth in the morning and 1 tablet before bedtime. aspirin EC 81 MG EC tablet  Take 1 tablet by mouth daily     atorvastatin 40 MG tablet  Commonly known as: LIPITOR  Take 1 tablet by mouth once daily     blood glucose monitor kit and supplies  1 kit by Other route three times daily Dispense Butterfly Elite CBG Device     * Blood Pressure Kit  1 kit by Does not apply route three times daily     * Blood Pressure Kit  Diagnosis: HTN. Needs to check blood pressure 1-2 times a day until stable, then once a day. Goal blood pressure less than 135/85, and above 110/60. Compressor/Nebulizer Misc  Nebulizer with compressor. Disposable Med Nebs 2 /month. Reusable Med Nebs 1 per 6 months. Aerosol Mask 1 per month. Replacement Filters 2 per month. All other related supplies as needed per month. Medications being used: Albuterol . 083 unit dose vial. Frequency: every 6 hrs x 4/day . Length of Need: 1     desloratadine 5 MG tablet  Commonly known as: CLARINEX  Take 1 tablet by mouth once daily     epoetin rick-epbx 17525 UNIT/ML Soln injection  Commonly known as: RETACRIT  Inject 0.5 mLs into the skin three times a week     ferrous sulfate 325 (65 Fe) MG tablet  Commonly known as: IRON 325  Take 1 tablet by mouth in the morning and at bedtime     fluticasone-salmeterol 115-21 MCG/ACT inhaler  Commonly known as: Advair HFA  Inhale 2 puffs into the lungs 2 times daily Maintenance inhaler     FreeStyle Lancets Misc  1 each by Does not apply route daily Patient needs to contact office before any further refills will be approved     FREESTYLE LITE strip  Generic drug: blood glucose test strips  1 each by In Vitro route daily As needed. furosemide 80 MG tablet  Commonly known as: LASIX  Take 1 tablet by mouth in the morning. Handicap Placard Misc  by Does not apply route Expires on 12/30/25     hydrocortisone 25 MG suppository  Commonly known as: ANUSOL-HC  Place 1 suppository rectally 2 times daily     levothyroxine 25 MCG tablet  Commonly known as: SYNTHROID  Take 1 tablet by mouth Daily     magnesium oxide 400 (241.3 Mg) MG Tabs tablet  Commonly known as: MAG-OX  Take 1 tablet by mouth twice daily     metoprolol tartrate 25 MG tablet  Commonly known as: LOPRESSOR  Take 1 tablet by mouth 2 times daily     miconazole 2 % powder  Commonly known as: MICOTIN  Apply topically 2 times daily UNDER THE SKIN FOLDS LONG TERM     pantoprazole 40 MG tablet  Commonly known as: PROTONIX  Take 1 tablet by mouth every morning (before breakfast)     polyethylene glycol 17 g packet  Commonly known as: GLYCOLAX  Take 17 g by mouth 2 times daily     QUEtiapine 25 MG tablet  Commonly known as: SEROQUEL  Take 1 tablet by mouth nightly     therapeutic multivitamin-minerals tablet  Take 1 tablet by mouth daily     vitamin D 1.25 MG (22482 UT) Caps capsule  Commonly known as: ERGOCALCIFEROL  Take 1 capsule by mouth once a week Sundays           * This list has 2 medication(s) that are the same as other medications prescribed for you. Read the directions carefully, and ask your doctor or other care provider to review them with you.                 STOP taking these medications      amLODIPine 10 MG tablet  Commonly known as: NORVASC               Where to Get Your Medications        These medications were sent to [facility], [address] [place] [address] [phone]  [address] [facility] [address], [address]      Phone: [phone]   ciprofloxacin 500 MG tablet  tiotropium 2.5 MCG/ACT Aers inhaler         Electronically signed by   Cynthia Frankel MD  7/24/2022  1:48 PM      Thank you Dr. Madison Hatchet, MD for the opportunity to be involved in this patient's care.

## 2022-07-25 LAB
ABO/RH: NORMAL
ANION GAP SERPL CALCULATED.3IONS-SCNC: 8 MMOL/L (ref 9–17)
ANTIBODY SCREEN: NEGATIVE
ARM BAND NUMBER: NORMAL
BLD PROD TYP BPU: NORMAL
BLOOD BANK BLOOD PRODUCT EXPIRATION DATE: NORMAL
BLOOD BANK ISBT PRODUCT BLOOD TYPE: 600
BLOOD BANK PRODUCT CODE: NORMAL
BLOOD BANK UNIT TYPE AND RH: NORMAL
BPU ID: NORMAL
BUN BLDV-MCNC: 26 MG/DL (ref 8–23)
CALCIUM SERPL-MCNC: 8 MG/DL (ref 8.6–10.4)
CHLORIDE BLD-SCNC: 98 MMOL/L (ref 98–107)
CO2: 27 MMOL/L (ref 20–31)
CREAT SERPL-MCNC: 2.2 MG/DL (ref 0.5–0.9)
CROSSMATCH RESULT: NORMAL
CULTURE: NORMAL
DISPENSE STATUS BLOOD BANK: NORMAL
EXPIRATION DATE: NORMAL
GFR AFRICAN AMERICAN: 27 ML/MIN
GFR NON-AFRICAN AMERICAN: 22 ML/MIN
GFR SERPL CREATININE-BSD FRML MDRD: ABNORMAL ML/MIN/{1.73_M2}
GLUCOSE BLD-MCNC: 99 MG/DL (ref 70–99)
HCT VFR BLD CALC: 27.3 % (ref 36–46)
HEMOGLOBIN: 9.2 G/DL (ref 12–16)
MCH RBC QN AUTO: 31.4 PG (ref 26–34)
MCHC RBC AUTO-ENTMCNC: 33.6 G/DL (ref 31–37)
MCV RBC AUTO: 93.4 FL (ref 80–100)
PDW BLD-RTO: 19.3 % (ref 11.5–14.9)
PLATELET # BLD: 161 K/UL (ref 150–450)
PMV BLD AUTO: 7.5 FL (ref 6–12)
POTASSIUM SERPL-SCNC: 4.2 MMOL/L (ref 3.7–5.3)
RBC # BLD: 2.92 M/UL (ref 4–5.2)
SODIUM BLD-SCNC: 133 MMOL/L (ref 135–144)
SPECIMEN DESCRIPTION: NORMAL
TRANSFUSION STATUS: NORMAL
UNIT DIVISION: 0
UNIT ISSUE DATE/TIME: NORMAL
WBC # BLD: 5.5 K/UL (ref 3.5–11)

## 2022-07-25 PROCEDURE — 2060000000 HC ICU INTERMEDIATE R&B

## 2022-07-25 PROCEDURE — 90935 HEMODIALYSIS ONE EVALUATION: CPT

## 2022-07-25 PROCEDURE — 6370000000 HC RX 637 (ALT 250 FOR IP): Performed by: NURSE PRACTITIONER

## 2022-07-25 PROCEDURE — 6370000000 HC RX 637 (ALT 250 FOR IP)

## 2022-07-25 PROCEDURE — 94640 AIRWAY INHALATION TREATMENT: CPT

## 2022-07-25 PROCEDURE — 97530 THERAPEUTIC ACTIVITIES: CPT

## 2022-07-25 PROCEDURE — 2500000003 HC RX 250 WO HCPCS: Performed by: INTERNAL MEDICINE

## 2022-07-25 PROCEDURE — 6370000000 HC RX 637 (ALT 250 FOR IP): Performed by: INTERNAL MEDICINE

## 2022-07-25 PROCEDURE — 85027 COMPLETE CBC AUTOMATED: CPT

## 2022-07-25 PROCEDURE — 6360000002 HC RX W HCPCS: Performed by: STUDENT IN AN ORGANIZED HEALTH CARE EDUCATION/TRAINING PROGRAM

## 2022-07-25 PROCEDURE — 80048 BASIC METABOLIC PNL TOTAL CA: CPT

## 2022-07-25 PROCEDURE — 2580000003 HC RX 258: Performed by: INTERNAL MEDICINE

## 2022-07-25 PROCEDURE — 97116 GAIT TRAINING THERAPY: CPT

## 2022-07-25 PROCEDURE — 36415 COLL VENOUS BLD VENIPUNCTURE: CPT

## 2022-07-25 RX ORDER — ASPIRIN 81 MG/1
81 TABLET ORAL DAILY
Qty: 30 TABLET | Refills: 0 | Status: SHIPPED | OUTPATIENT
Start: 2022-07-25 | End: 2022-09-28

## 2022-07-25 RX ORDER — CIPROFLOXACIN 2 MG/ML
400 INJECTION, SOLUTION INTRAVENOUS EVERY 12 HOURS
Status: CANCELLED | OUTPATIENT
Start: 2022-07-25 | End: 2022-07-29

## 2022-07-25 RX ADMIN — Medication 2 PUFF: at 19:46

## 2022-07-25 RX ADMIN — ATORVASTATIN CALCIUM 40 MG: 40 TABLET, FILM COATED ORAL at 19:50

## 2022-07-25 RX ADMIN — QUETIAPINE FUMARATE 25 MG: 25 TABLET ORAL at 19:50

## 2022-07-25 RX ADMIN — SODIUM CHLORIDE, PRESERVATIVE FREE 10 ML: 5 INJECTION INTRAVENOUS at 08:40

## 2022-07-25 RX ADMIN — SODIUM CHLORIDE, PRESERVATIVE FREE 10 ML: 5 INJECTION INTRAVENOUS at 08:39

## 2022-07-25 RX ADMIN — PANTOPRAZOLE SODIUM 40 MG: 40 TABLET, DELAYED RELEASE ORAL at 05:14

## 2022-07-25 RX ADMIN — Medication 1.6 ML: at 17:10

## 2022-07-25 RX ADMIN — ACETAMINOPHEN 325MG 650 MG: 325 TABLET ORAL at 23:25

## 2022-07-25 RX ADMIN — AMIODARONE HYDROCHLORIDE 100 MG: 100 TABLET ORAL at 09:14

## 2022-07-25 RX ADMIN — CIPROFLOXACIN 400 MG: 2 INJECTION, SOLUTION INTRAVENOUS at 08:31

## 2022-07-25 RX ADMIN — ACETAMINOPHEN 325MG 650 MG: 325 TABLET ORAL at 05:14

## 2022-07-25 RX ADMIN — ACETAMINOPHEN 325MG 650 MG: 325 TABLET ORAL at 17:46

## 2022-07-25 RX ADMIN — SODIUM CHLORIDE, PRESERVATIVE FREE 10 ML: 5 INJECTION INTRAVENOUS at 21:20

## 2022-07-25 RX ADMIN — METOPROLOL TARTRATE 25 MG: 25 TABLET, FILM COATED ORAL at 19:49

## 2022-07-25 RX ADMIN — LEVOTHYROXINE SODIUM 25 MCG: 0.03 TABLET ORAL at 05:14

## 2022-07-25 ASSESSMENT — PAIN - FUNCTIONAL ASSESSMENT: PAIN_FUNCTIONAL_ASSESSMENT: ACTIVITIES ARE NOT PREVENTED

## 2022-07-25 ASSESSMENT — PAIN SCALES - GENERAL
PAINLEVEL_OUTOF10: 5
PAINLEVEL_OUTOF10: 7
PAINLEVEL_OUTOF10: 6
PAINLEVEL_OUTOF10: 6

## 2022-07-25 ASSESSMENT — PAIN DESCRIPTION - ORIENTATION: ORIENTATION: LEFT;RIGHT

## 2022-07-25 ASSESSMENT — PAIN DESCRIPTION - DESCRIPTORS: DESCRIPTORS: ACHING

## 2022-07-25 ASSESSMENT — PAIN DESCRIPTION - LOCATION: LOCATION: HEAD

## 2022-07-25 NOTE — FLOWSHEET NOTE
07/25/22 1209   Encounter Summary   Encounter Overview/Reason  Spiritual/Emotional Needs   Service Provided For: Patient and family together   Referral/Consult From: Sruthi Jalloh   Last Encounter  07/25/22   Complexity of Encounter Low   Spiritual/Emotional needs   Type Spiritual Support   Palliative Care   Type Palliative Care, Follow-up   Assessment/Intervention/Outcome   Assessment Coping; Hopeful   Intervention Sustaining Presence/Ministry of presence;Prayer (assurance of)/[place];Nurtured Hope;Discussed illness injury and its impact; Active listening   Outcome Receptive;Engaged in conversation;Coping

## 2022-07-25 NOTE — PLAN OF CARE
Problem: Discharge Planning  Goal: Discharge to home or other facility with appropriate resources  7/25/2022 1649 by Angélica Chung RN  Outcome: Progressing Towards Goal  7/25/2022 0441 by Dora Oh RN  Outcome: Progressing Towards Goal     Problem: Skin/Tissue Integrity  Goal: Absence of new skin breakdown  Description: 1. Monitor for areas of redness and/or skin breakdown  2. Assess vascular access sites hourly  3. Every 4-6 hours minimum:  Change oxygen saturation probe site  4. Every 4-6 hours:  If on nasal continuous positive airway pressure, respiratory therapy assess nares and determine need for appliance change or resting period.   7/25/2022 1649 by Angélica Chung RN  Outcome: Progressing Towards Goal  7/25/2022 0441 by Dora Oh RN  Outcome: Progressing Towards Goal     Problem: Safety - Adult  Goal: Free from fall injury  7/25/2022 1649 by Angélica Chung RN  Outcome: Progressing Towards Goal  7/25/2022 0441 by Dora Oh RN  Outcome: Progressing Towards Goal     Problem: ABCDS Injury Assessment  Goal: Absence of physical injury  7/25/2022 1649 by Angélica Chung RN  Outcome: Progressing Towards Goal  7/25/2022 0441 by Dora Oh RN  Outcome: Progressing Towards Goal     Problem: Chronic Conditions and Co-morbidities  Goal: Patient's chronic conditions and co-morbidity symptoms are monitored and maintained or improved  7/25/2022 1649 by Angélica Chung RN  Outcome: Progressing Towards Goal  7/25/2022 0441 by Dora Oh RN  Outcome: Progressing Towards Goal     Problem: Pain  Goal: Verbalizes/displays adequate comfort level or baseline comfort level  7/25/2022 1649 by Angélica Chung RN  Outcome: Progressing Towards Goal  7/25/2022 0441 by Dora Oh RN  Outcome: Progressing Towards Goal     Problem: Nutrition Deficit:  Goal: Optimize nutritional status  7/25/2022 1649 by Angélica Chung RN  Outcome: Progressing Towards Goal  7/25/2022 0441 by Ave Blankenship RN  Outcome: Progressing Towards Goal

## 2022-07-25 NOTE — PROGRESS NOTES
Department of Internal Medicine  Nephrology Maryann Ruiz MD  Progress Note    Reason for consultation: Management of hemodialysis dependent new onset end-stage renal disease. Consulting physician: Luca Vaz MD.    Interval history: Patient was seen and examined today and she does not have any complaints. She does not have evidence of ongoing gastrointestinal bleeding at this time. She had no abdominal pain or shortness of breath. History of present illness: This is a 66 y.o. female with a significant past medical history of Cerebrovascular accident, combined diastolic and systolic heart failure, essential hypertension, partial deafness and chronic kidney disease stage IV [associated with nephrotic range proteinuria [16 g/g], who was admitted to [facility] from 6/27/22 to 7/18/2022 for treatment of hypoxia and bradycardia. She required endotracheal intubation and was extubated on 7/6/2022. Acute hemodialysis was started during that admission for management of worsening azotemia associated with edema and tunneled hemodialysis catheter was eventually placed on 7/8/2022. Patient was on a TTS hemodialysis schedule but was sent to the [facility] where she was supposed to be starting a MWF hemodialysis schedule. Within 24 hours of getting to the [facility], she developed black tarry stools and was sent to the hospital and admitted for GI bleed with hemoglobin 4.9 g/dL yesterday. She has remained relatively hemodynamically stable with systolic blood pressure greater than 100 mmHg. She is completing the first of 2 scheduled units of packed red blood transfusions and is due to be evaluated by GI service. She is hard of hearing but alert and oriented.     Scheduled Meds:   pantoprazole  40 mg Oral QAM AC    metoprolol tartrate  25 mg Oral BID    ciprofloxacin  400 mg IntraVENous Q24H    tiotropium  2 puff Inhalation Daily    budesonide-formoterol  2 puff Inhalation BID    hydrocortisone  25 mg Rectal BID    QUEtiapine  25 mg Oral Nightly    levothyroxine  25 mcg Oral Daily    furosemide  80 mg Oral Daily    atorvastatin  40 mg Oral Nightly    amiodarone  100 mg Oral Daily    sodium chloride flush  5-40 mL IntraVENous 2 times per day     Continuous Infusions:   sodium chloride      sodium chloride      sodium chloride      sodium chloride      sodium chloride       PRN Meds:.potassium chloride **OR** potassium chloride, sodium chloride flush, sodium chloride, albuterol sulfate HFA, sodium chloride flush, sodium chloride, ondansetron **OR** ondansetron, acetaminophen **OR** acetaminophen, sodium chloride, sodium chloride, sodium chloride    Physical Exam:    VITALS:  BP (!) 129/56   Pulse 75   Temp 98 °F (36.7 °C) (Axillary)   Resp 16   Ht 4' 11\" (1.499 m)   Wt 138 lb 0.1 oz (62.6 kg)   SpO2 100%   BMI 27.87 kg/m²   TEMPERATURE:  Current - Temp: 98 °F (36.7 °C); Max - Temp  Av.8 °F (36.6 °C)  Min: 97.6 °F (36.4 °C)  Max: 98 °F (36.7 °C)  RESPIRATIONS RANGE: Resp  Av.5  Min: 16  Max: 18  PULSE RANGE: Pulse  Av.6  Min: 75  Max: 99  BLOOD PRESSURE RANGE:  Systolic (31DTF), TLO:052 , Min:115 , BXA:964 ; Diastolic (66NIU), ELISE:69, Min:48, Max:80  PULSE OXIMETRY RANGE: SpO2  Av.6 %  Min: 94 %  Max: 100 %  24HR INTAKE/OUTPUT:    Intake/Output Summary (Last 24 hours) at 2022 1052  Last data filed at 2022 1814  Gross per 24 hour   Intake 600 ml   Output --   Net 600 ml       Constitutional: alert, appears stated age, and cooperative    Skin: Skin color, texture, turgor normal. No rashes or lesions    Head: Normocephalic, without obvious abnormality, atraumatic     Cardiovascular/Edema: regular rate and rhythm, S1, S2 normal, no murmur, click, rub or gallop    Respiratory: Lungs: clear to auscultation bilaterally    Abdomen: soft, non-tender; bowel sounds normal; no masses,  no organomegaly    Back: symmetric, no curvature. ROM normal. No CVA tenderness.     Extremities: extremities normal, atraumatic, no cyanosis or edema    Neuro:  Grossly normal    CBC:   Recent Labs     07/23/22  1011   WBC 5.1   HGB 8.1*   *       BMP:    Recent Labs     07/23/22  1011      K 4.2      CO2 27   BUN 13   CREATININE 1.13*   GLUCOSE 150*       Lab Results   Component Value Date/Time    NITRU NEGATIVE 07/20/2022 06:00 PM    COLORU Dark Yellow 07/20/2022 06:00 PM    PHUR 5.5 07/20/2022 06:00 PM    WBCUA 21 TO 50 07/20/2022 06:00 PM    RBCUA 51  07/20/2022 06:00 PM    MUCUS 1+ 06/27/2022 06:35 PM    TRICHOMONAS NOT REPORTED 02/07/2022 02:42 AM    YEAST NOT REPORTED 02/07/2022 02:42 AM    BACTERIA MANY 07/20/2022 06:00 PM    SPECGRAV 1.016 07/20/2022 06:00 PM    LEUKOCYTESUR MOD 07/20/2022 06:00 PM    UROBILINOGEN Normal 07/20/2022 06:00 PM    BILIRUBINUR NEGATIVE 07/20/2022 06:00 PM    GLUCOSEU TRACE 07/20/2022 06:00 PM    KETUA TRACE 07/20/2022 06:00 PM    AMORPHOUS 1+ 07/20/2022 06:00 PM     Urine Sodium:     Lab Results   Component Value Date/Time    SLOAN <20 06/08/2022 09:52 PM     Urine Protein:     Lab Results   Component Value Date/Time     10/27/2020 04:35 PM     Urine Creatinine:     Lab Results   Component Value Date/Time    LABCREA 63.0 06/08/2022 09:52 PM     IMPRESSION/RECOMMENDATIONS:    1.  End-stage renal disease - Secondary to diabetic nephropathy. We will place patient on a Monday/Wednesday/Friday hemodialysis schedule. Patient will receive acute hemodialysis today. I was told that she will be going to the [facility] and if that is confirmed, she would get dialysis in the F. I will ever, if she is not being discharged today, she will get dialysis in-house in the hospital.  Monitor urine output closely. GFR appears to be improving and we will consider holding dialysis till further notice. 2.  Systemic hypertension - blood pressure control is adequate. 3.  Anemia secondary to GI bleed.   Patient however has underlying anemia of chronic kidney disease. Prognosis for renal function recovery is good.     Manuel Carrera MD   Attending Nephrologist  7/25/2022 10:52 AM

## 2022-07-25 NOTE — PROGRESS NOTES
Comprehensive Nutrition Assessment    Type and Reason for Visit:  Reassess    Nutrition Recommendations/Plan:   Continue diet as ordered. Malnutrition Assessment:  Malnutrition Status:  Severe malnutrition (07/22/22 1115)    Context:  Chronic Illness     Findings of the 6 clinical characteristics of malnutrition:  Energy Intake:  75% or less estimated energy requirements for 1 month or longer  Weight Loss:  10% over 6 months     Body Fat Loss:  Severe body fat loss Orbital   Muscle Mass Loss:  Severe muscle mass loss Temples (temporalis), Clavicles (pectoralis & deltoids)  Fluid Accumulation:  Mild Generalized   Strength:  Not Performed    Nutrition Assessment:    Pt is on a Regular diet not eating much at meals, SW note indicates discharge to [facility] tomorrow morning. Nutrition Related Findings:    Edema: +1 Generalized, +2 sacral. MBSS done 7-10 with recommendation for Soft & Bite Sized with thin liquids. HX: CHF, COPD, DM, CVA, CKD on dialysis. Labs & meds reviewed. Wound Type: None       Current Nutrition Intake & Therapies:    Average Meal Intake: 1-25%, 26-50%     ADULT DIET; Regular    Anthropometric Measures:  Height: 4' 11\" (149.9 cm)  Ideal Body Weight (IBW): 95 lbs (43 kg)    Admission Body Weight: 138 lb (62.6 kg)  Current Body Weight: 138 lb (62.6 kg), 145.3 % IBW. Weight Source: Bed Scale  Current BMI (kg/m2): 27.9  Usual Body Weight: 156 lb (70.8 kg)  % Weight Change (Calculated): -11.5                    BMI Categories: Overweight (BMI 25.0-29. 9)    Estimated Daily Nutrient Needs:  Energy Requirements Based On: Kcal/kg  Weight Used for Energy Requirements: Current  Energy (kcal/day): 8664-5875 kcals based on 30-32 kcals/kg  Weight Used for Protein Requirements: Ideal  Protein (g/day): 77-86 gm protein based on 1.8-2 gm/kg       Nutrition Diagnosis:   Severe malnutrition related to inadequate protein-energy intake as evidenced by weight loss, poor intake prior to admission, severe loss of subcutaneous fat, severe muscle loss    Nutrition Interventions:   Food and/or Nutrient Delivery: Continue Current Diet  Nutrition Education/Counseling: No recommendation at this time  Coordination of Nutrition Care: Continue to monitor while inpatient       Goals:  Previous Goal Met: No Progress toward Goal(s)  Goals: Meet at least 75% of estimated needs       Nutrition Monitoring and Evaluation:   Behavioral-Environmental Outcomes: None Identified  Food/Nutrient Intake Outcomes: Diet Advancement/Tolerance  Physical Signs/Symptoms Outcomes: Biochemical Data, GI Status, Hemodynamic Status, Fluid Status or Edema, Weight    Discharge Planning:     Too soon to determine     Gordon Narayan, 65 [address],   [phone]

## 2022-07-25 NOTE — PROGRESS NOTES
Occupational Therapy  Facility/Department: [facility] PROGRESSIVE CARE  Daily Treatment Note  NAME: Unique Barajas  :   MRN: [id number]    Date of Service: 2022    Discharge Recommendations:  Patient would benefit from continued therapy after discharge         Patient Diagnosis(es): The primary encounter diagnosis was Gastrointestinal hemorrhage, unspecified gastrointestinal hemorrhage type. A diagnosis of Anemia, unspecified type was also pertinent to this visit. Assessment    Activity Tolerance: Patient limited by endurance; Patient tolerated treatment well  Discharge Recommendations: Patient would benefit from continued therapy after discharge      Plan   Plan  Times per Week: 5-7  Current Treatment Recommendations: Strengthening;Balance training;Functional mobility training; Endurance training;Cognitive reorientation;Pain management; Safety education & training;Patient/Caregiver education & training;Equipment evaluation, education, & procurement;Positioning;Self-Care / ADL; Home management training;Coordination training;Cognitive/Perceptual training     Restrictions       Subjective   Subjective  Subjective: \" This food is awful, I am so sick because I haven't eaten in 5 days! \" Pt perseverating on food, requiring cues to remain on task throughout session  Orientation  Overall Orientation Status: Within Functional Limits           Objective    Vitals     Bed Mobility Training  Bed Mobility Training: Yes  Supine to Sit: Minimum assistance (HOB elevated and using bed rails)  Duration: 5 (pt sat EOB for 5 min, complaining of being mildly dizzy, but subsiding shortly after onset)  Balance  Sitting: Intact  Transfer Training  Transfer Training: Yes  Overall Level of Assistance: Minimum assistance;Assist X2  Interventions: Safety awareness training;Verbal cues  Sit to Stand: Maximum assistance;Assist X2 (max x2 from chair, min x2 form EOB)  Gait  Overall Level of Assistance: Assist X2;Minimum assistance (for safety with Rw. From EOB to bed side chair)  Interventions: Verbal cues; Safety awareness training     ADL  Feeding: Modified independent   Grooming: Minimal assistance  UE Bathing: Minimal assistance  LE Bathing: Maximum assistance  UE Dressing: Minimal assistance  LE Dressing: Maximum assistance  Toileting: Maximum assistance  Additional Comments: ADL scores based on skilled clinical observation only this date     Other Specialty Interventions  Other Treatments/Modalities: during Co-tx with PTA Brenda. Pt performed stand step tranfer form EOB> bed side chair. Cues required for safety and hand placement during transfer. Patient Education  Education Given To: Patient  Education Provided: Role of Therapy;Plan of Care;Precautions;Transfer Training;Energy Conservation; Fall Prevention Strategies  Education Method: Verbal  Barriers to Learning: Cognition; Hearing;Vision  Education Outcome: Continued education needed    Goals  Short Term Goals  Time Frame for Short term goals: By discharge  Short Term Goal 1: Pt will complete UB ADLs with SBA and good safety  Short Term Goal 2: Pt will complete LB ADLs with Min A, good safety, and use of AE as needed  Short Term Goal 3: Pt will be educated on and explore use of DME/AE and modified techniques for increasing ease and independence with ADLs upon d/c  Short Term Goal 4: Pt will sit EOB unsupported for 10+ minutes during functional tasks for increased independence with ADLs  Short Term Goal 5: Pt will participate in 15-25 minutes of therapeutic exercise/functional activity to increase safety and independence with self-care and mobility  Short Term Goal 6: Pt will follow 100% of 2-step commands to address cognitive concerns for improved participation in meaningful occupations  Short Term Goal 7: Pt will actively participate in self-care routine at EOB or up to chair to promote increased overall strength and activity tolerance  Short Term Goal 8: OT to assess functional transfers/mobility as able       Therapy Time   Individual Concurrent Group Co-treatment   Time In 0826         Time Out 0850         Minutes [id number]

## 2022-07-25 NOTE — PROGRESS NOTES
HEMODIALYSIS POST TREATMENT NOTE    Treatment time ordered: 180 minutes    Actual treatment time: 180 minutes    UltraFiltration Goal: 3000 ml  UltraFiltration Removed: 2300 ml net fluid removal      Pre Treatment weight: 57.3 KG  Post Treatment weight: 55 Kg  Estimated Dry Weight:     Access used:     Central Venous Catheter:          Tunnel            Site: Rt. chest          Access Flow: good             Dialysis terminated 7 minutes early due to lowering TMP, threatened to clot system          Sign and symptoms of infection: No          Medications or blood products given:No    Chronic outpatient schedule: [facility]    Chronic outpatient unit:[facility]    Summary of response to treatment: Tolerated dialysis well    Explain if orders NOT met, was physician notified  N/A      ACES flowsheet faxed to patient unit/ placed in patient chart: N/A, Epic charting    Post assessment completed: Yes    Report given to: Sissy Barron RN      * Intra-treatment documented Safety Checks include the followin) Access and face visible at all times. 2) All connections and blood lines are secure with no kinks. 3) NVL alarm engaged. 4) Hemosafe device applied (if applicable). 5) No collapse of Arterial or Venous blood chambers. 6) All blood lines / pump segments in the air detectors.

## 2022-07-25 NOTE — PROGRESS NOTES
[facility]    PROGRESS NOTE             7/25/2022    9:08 AM    Name:   Laurie Tuttle  MRN:     [id number]     Acct:      [de-identified]   Room:   2088/2088-01   Day:  5  Admit Date:  7/19/2022 11:42 PM    PCP:  Rashad Pal MD  Code Status:  Full Code    Subjective:     C/C:   Chief Complaint   Patient presents with    Melena     Interval History Status: not changed. Patient seen and examined at the bed side, no new acute events overnight. Patient's plan to discharge   This morning the patient is eating breakfast, regular diet. Notes from nursing staff and Consults had been reviewed, and the overnight progress had been checked with the nursing staff as well. Brief History:     The patient is a 66 y.o.  female, with a history of COPD, ESRD on dialysis MWF and A-fib. Patient has an extensive medical history and was transported to the ED from [address] [place] [address] [address] after being in the SNF for only 1 day. She was admitted to [address] [place] [address] 6/27-7/18 admitted for encephalopathy, hypoxia requiring mechanical ventilation and bradycardia requiring atropine and transcutaneous pacing. She sustained JOY on top of Stage 4 CKD resulting in hyperkalemia and she is now dialysis dependent. During hospitalization she had multiple episodes of recurrent anemia requiring transfusion of PRBC. Despite recurrent anemia she was discharged to SNF on eliquis. She presented to the ED due to acute rectal bleeding. Daughter is at bedside and reports that when she went to the SNF to see her mother she noticed that the patient was lethargic and more confused. Patient has had dark stools from taking iron but when her brief was changed the aide reported a large amount of bright red blood mixed with stool. Her symptoms are acute and severe with a hemoglobin of 4.9. 2 units of PRBC were ordered in the ED and GI consulted.  Symptoms continued to be associated with lethargy and mild confusion. Patient is able to state that she is in the hospital and responds to most questions appropriately in regards to her recent medical history. She does have a chronic dry cough from COPD. Daughter also reports patient having a decreased appetite and feels that patient has lost weight but is uncertain of amount. There are no reported aggravating or alleviating factors. Denies fever, chills, chest pain, abdominal pain, nausea, vomiting, diarrhea, and urinary symptoms. When confirming code status as Full code, her daughter stated that family has been waiting for patient's mentation to improve prior to further discussing her code status and goals of care. Patient has 4 adult children. Review of Systems:     CONSTITUTIONAL:  no fevers  Psych: Normal mood and affect, no depression, no suicidal ideation. HEENT: No headaches, no nasal congestion, no difficulty swallowing  RESPIRATORY:negative for dyspnea, cough present  CARDIOVASCULAR: negative for chest pain, no palpitations  GASTROINTESTINAL: no nausea, no vomiting, no change in bowel habits, no abdominal pain   GENITOURINARY: negative for dysuria, no hematuria   MUSCULOSKELETAL: no joint pains, no muscle aches, no swelling of joints or extremities  NEUROLOGICAL: Weakness history of stroke      Medications: Allergies:     Allergies   Allergen Reactions    Latex Rash    Aleve [Naproxen Sodium]      Chronic kidney disease stage III, CHF    Pioglitazone Other (See Comments)     Congestive heart failure    Claritin [Loratadine]     Keflex [Cephalexin]     Lisinopril      Needs clarification of contraindication       Current Meds:   Scheduled Meds:    pantoprazole  40 mg Oral QAM AC    metoprolol tartrate  25 mg Oral BID    ciprofloxacin  400 mg IntraVENous Q24H    tiotropium  2 puff Inhalation Daily    budesonide-formoterol  2 puff Inhalation BID    hydrocortisone  25 mg Rectal BID    QUEtiapine  25 mg Oral Nightly levothyroxine  25 mcg Oral Daily    furosemide  80 mg Oral Daily    atorvastatin  40 mg Oral Nightly    amiodarone  100 mg Oral Daily    sodium chloride flush  5-40 mL IntraVENous 2 times per day     Continuous Infusions:    sodium chloride      sodium chloride      sodium chloride      sodium chloride      sodium chloride       PRN Meds: potassium chloride **OR** potassium chloride, sodium chloride flush, sodium chloride, albuterol sulfate HFA, sodium chloride flush, sodium chloride, ondansetron **OR** ondansetron, acetaminophen **OR** acetaminophen, sodium chloride, sodium chloride, sodium chloride    Data:     Past Medical History:   has a past medical history of Acute CVA (cerebrovascular accident) ([facility] Acute on chronic combined systolic (congestive) and diastolic (congestive) heart failure ([facility] Arthritis, Asthma, Bradycardia, ESRD (end stage renal disease) ([facility] Essential hypertension, Hallucinations, Hard of hearing, Head contusion, Iron deficiency anemia, unspecified, Meningioma ([facility] [facility] Myocardial infarction ([facility] Pure hypercholesterolemia, Type 2 diabetes mellitus with stage 4 chronic kidney disease, without long-term current use of insulin ([facility] Unspecified venous (peripheral) insufficiency, and Unspecified vitamin D deficiency. Social History:   reports that she quit smoking about 22 years ago. She has a 2.50 pack-year smoking history. She has never used smokeless tobacco. She reports that she does not currently use alcohol. She reports that she does not use drugs.      Family History:   Family History   Problem Relation Age of Onset    Diabetes Mother     Heart Disease Mother     Heart Disease Father     Diabetes Sister     High Blood Pressure Sister     Diabetes Maternal Grandmother        Vitals:  BP (!) 129/56   Pulse 75   Temp 98 °F (36.7 °C) (Axillary)   Resp 16   Ht 4' 11\" (1.499 m)   Wt 138 lb 0.1 oz (62.6 kg)   SpO2 100%   BMI 27.87 kg/m²   Temp (24hrs), Av.8 °F (36.6 °C), Min:97.6 °F (36.4 °C), Max:98 °F (36.7 °C)      No results for input(s): POCGLU in the last 72 hours. I/O(24Hr): Intake/Output Summary (Last 24 hours) at 7/25/2022 0908  Last data filed at 7/24/2022 1814  Gross per 24 hour   Intake 600 ml   Output --   Net 600 ml       Labs:      Lab Results   Component Value Date/Time    SPECIAL NOT GIVEN 06/27/2022 01:11 PM     Lab Results   Component Value Date/Time    CULTURE POSITIVE Blood Culture (A) 07/21/2022 08:17 AM    CULTURE  07/21/2022 08:17 AM     DIRECT GRAM STAIN FROM BOTTLE: GRAM POSITIVE COCCI IN CLUSTERS    CULTURE  07/21/2022 08:17 AM     Detected: Staphylococcus epidermidis Detected: mecA/C Gene Detected- Methicillin Resistant Organism Methodology- Polymerase Chain Reaction (PCR)    CULTURE (A) 07/21/2022 08:17 AM     STAPHYLOCOCCUS EPIDERMIDIS A single positive blood culture of coagulase negative Staphylocci, diphtheroids,micrococci, Cutibacterium, viridans Streptocci, Bacillus, or Lactobacillus species should be interpreted with caution and viewed as a likely skin contaminant. CULTURE  07/21/2022 08:17 AM     (NOTE) Direct Gram Stain from bottle result called to and read back by: SHERITA Healy ON 7/22/22         Radiology:    NM GI BLOOD LOSS    Result Date: 7/20/2022  EXAMINATION: NUCLEAR MEDICINE GASTRIC BLEEDING STUDY 7/20/2022 TECHNIQUE: Following the intravenous injection of 22.5 mCi of 99mTc-labeled RBCs, a flow study and standard images of the abdomen was obtained over a total period of 60 minutes. COMPARISON: Abdomen and pelvis CT 05/25/2020 HISTORY: ORDERING SYSTEM PROVIDED HISTORY: rectal bleeding TECHNOLOGIST PROVIDED HISTORY: rectal bleeding Reason for Exam: rectal bleeding FINDINGS: Activity within the blood pool, including the great vessels, heart, liver, and spleen. Accumulation of a small amount of activity in the urinary bladder over the course of the study.   Abnormal bowel activity beginning by approximately 13 minutes, exam with moderate right-sided pleural effusion with right basilar airspace disease. XR CHEST PORTABLE    Result Date: 7/20/2022  EXAMINATION: ONE XRAY VIEW OF THE CHEST 7/19/2022 11:57 pm COMPARISON: Prior studies including 06/30/2022 and 07/14/2022 HISTORY: ORDERING SYSTEM PROVIDED HISTORY: pain TECHNOLOGIST PROVIDED HISTORY: pain Reason for Exam: CP and melena X several days FINDINGS: Pulmonary edema has improved from 07/14/2022. There is a persistent moderate right pleural effusion with right basilar consolidation. The heart is enlarged and unchanged. Prior sternal splitting procedure. A right IJ tunneled dialysis catheter remains in good position. Pulmonary edema has improved. Persistent moderate-sized right pleural effusion with basilar consolidation. CTA ABDOMEN PELVIS W CONTRAST    Result Date: 7/22/2022  EXAMINATION: CTA OF THE ABDOMEN AND PELVIS WITH CONTRAST 7/22/2022 9:30 am: TECHNIQUE: CTA of the abdomen and pelvis was performed without and with the administration of 100 mL Isovue 370 intravenous contrast. Multiplanar reformatted images are provided for review. MIP images are provided for review. 3D reconstructions were performed on independent workstation. This automated exposure control, iterative reconstruction, and/or weight based adjustment of the mA/kV was utilized to reduce the radiation dose to as low as reasonably achievable. COMPARISON: GI bleeding scan from 2 days ago, CT on 05/25/2021 HISTORY: Acute GI bleeding. FINDINGS: Post CABG changes. Mild cardiomegaly. Moderate right pleural effusion with adjacent compressive atelectasis. Mild-moderate left pleural effusion with adjacent compressive atelectasis. CTA ABDOMEN: No hyperdensity is seen within the gastrointestinal tract on noncontrast images or after contrast administration. No acute inflammation of the gastrointestinal tract. No bowel obstruction. Fatty atrophy of the pancreas.   Stable 1.5 cm stone in the mid to lower right kidney. No hydronephrosis. Stable benign right adrenal nodule which could represent an adenoma or myelolipoma; no follow-up imaging recommended. Remaining solid organs and the gallbladder are unremarkable. No ascites or significant lymphadenopathy. Severe atherosclerotic disease of the abdominal aorta without aneurysm. Major mesenteric arteries are patent. Moderate stenosis of the proximal celiac artery and mild stenosis of the proximal SMA. Moderate stenosis of the proximal ALLY. No significant renal artery stenosis. CTA PELVIS: Iliac arterial system is patent bilaterally with mild-moderate stenosis involving the common iliac arteries. Bladder and uterus are unremarkable. No lymphadenopathy or free fluid. Mild anasarca. Visualized osseous structures are unremarkable. 1. No evidence of active GI bleeding. 2. Severe atherosclerotic disease with arterial stenosis as described above. 3. Stable right nephrolithiasis. 4. Mild cardiomegaly with right greater than left pleural effusions and adjacent atelectasis. EKG:        Physical Examination:        PHYSICAL EXAM:  General Appearance  Alert , awake, not in acute distress  Psych: Normal mood and affect, normal behavior, not agitated, maintaining good eye contact   HEENT - Head is normocephalic, atraumatic. Neck: supple, no rigidity, normal ROM, no neck swellings, normal thyroid gland  Lungs - Bilateral equal air entry   Cardiovascular - Heart sounds are normal.  Regular rhythm, normal rate without murmur, gallop or rub.   Abdomen - Soft, nontender, nondistended, no masses or organomegaly  Neurologic - There are no new focal motor or sensory deficits  Skin - No bruising or bleeding on exposed skin area  Extremities - No cyanosis, clubbing or edema    Assessment:        Primary Problem  Gastrointestinal hemorrhage    Active Hospital Problems    Diagnosis Date Noted    Severe malnutrition ([facility] [E43] 07/22/2022     Priority: Medium Gastrointestinal hemorrhage [K92.2] 07/20/2022     Priority: Medium    Dependence on renal dialysis [facility]) [Z99.2]      Priority: Medium    UTI (urinary tract infection) [N39.0] 02/18/2021    Paroxysmal atrial fibrillation ([facility] [I48.0] 07/28/2020    CKD stage 4 secondary to hypertension ([facility] [I12.9, N18.4] 02/20/2020       Plan:        Patient for discharge     Lower GI bleed(Melena)      -GI consulted, general surgery consulted  Central line was insertedat t left internal jugular vein  - Patient no overt bleeding  - Patient significantly improved   -Potassium at 4.2  -Technetium scan was done, active GI bleeding cannot be evaluated. CTA/EGD could not find source of bleeding/capsule as an outpatient for GI  - Urinalysis had hemoglobin on 7/22  -Patient presented with Hb 4.9. Received 4 pRBC for now. 2 fresh frozen plasma. Hemoglobin currently stable  -IV Protonix bolus was given with continuous infusion  -Anticoagulation/Antiplatelets Held  -Adult diet full     UTI [Gram negative rods, coli]  Ciprofloxacin IV to oral for 7 days        Atrial Fibrillation  -Patient had Atrial Fibrillation at ED  -Hold eliquis [due to bleeding]  -Continue metoprolol tablet 25 mg 2 times daily  -Restarted Amiodarone      ESRD  -Nephrology on board  -Patient got confused more during dialysis on 22nd, dialysis was stopped at that moment  -On Wednesday/Friday/Monday dialysis schedule. Hypothyroidism  - Restarted  Levothyroxine     Patient is a quetiapine 25 mg nightly      DVT prophylaxis: reason for no prophylaxis: GI bleed  GI prophylaxis: Protonix 40 mg daily      Greg Maravilla MD  7/25/2022  9:08 AM        Attending Physician Statement  I have discussed the care of [address] [place] [address] Drive with the resident team. I have examined the patient myself and taken ros and hpi , including pertinent history and exam findings,  with the resident. I have reviewed the key elements of all parts of the encounter with the resident.   I agree with the assessment, plan and orders as documented by the resident. Principal Problem:    Gastrointestinal hemorrhage  Active Problems:    Dependence on renal dialysis ([facility]    CKD stage 4 secondary to hypertension (HCC)    Paroxysmal atrial fibrillation (HCC)    UTI (urinary tract infection)  Resolved Problems:    * No resolved hospital problems. *      Hb stable  Tolerating diet  ASA and eliquis reseumced per GI, however high risk of rebleed, HAS bLed score is 6 , will d/c eliquis, hold ASA for now.    Plan to discharge with OP VCE, after dialysis today      Electronically signed by Kassie Bolaños MD

## 2022-07-25 NOTE — CARE COORDINATION
-Anjalir notified by Shima Abdul that no one from [address] is calling her back to get this patient discharged to them today. Writer called [facility] and left a message for Lexy ([phone]) Admissions to call writer back. Waiting on return call    -Electronically signed by Belgica Dobbs RN on 7/25/2022 at 11:21 AM Writer tried calling [facility] [employer] the new admissions coordinator  at [phone] and she answered her phone. She is checking with Rosemary  about if Synetta Kim is needed and will call writer back. Greyson advised she is new and wants to make sure no Synetta Kim is needed. Waiting on call back. Electronically signed by Belgica Dobbs RN on 7/25/2022 at 11:28 AM     Anjalir received a call back from Pierson [employer]. She advised patient can come back anytime after she has dialysis.   She requires we call her back with a Transport time to facility  Electronically signed by Belgica Dobbs RN on 7/25/2022 at 11:31 AM

## 2022-07-25 NOTE — PROGRESS NOTES
Patient was seen and examined. Doing well. Discharge likely tomorrow. Tolerating diet. Afebrile vital signs are stable. Central line has been removed. No signs of acute GI bleed. Abdomen is soft. Blood work reviewed. Continue medical management. Discharge to [facility] tomorrow. Patient and the family updated from my standpoint.

## 2022-07-25 NOTE — PROGRESS NOTES
Physical Therapy  [address]    Date: 22  Patient Name: Taniya Whitlock       Room: 2910/6918-80  MRN: [id number]   Account: [de-identified]   : 1950  (72 y.o.) Gender: female     Referring Practitioner: Irma Sousa MD  Diagnosis: Gastrointestinal hemorrhage  Past Medical History:  has a past medical history of Acute CVA (cerebrovascular accident) [facility]), Acute on chronic combined systolic (congestive) and diastolic (congestive) heart failure ([address] Arthritis, Asthma, Bradycardia, ESRD (end stage renal disease) ([address] Essential hypertension, Hallucinations, Hard of hearing, Head contusion, Iron deficiency anemia, unspecified, Meningioma ([address] Myocardial infarction ([address] Pure hypercholesterolemia, Type 2 diabetes mellitus with stage 4 chronic kidney disease, without long-term current use of insulin ([facility] Unspecified venous (peripheral) insufficiency, and Unspecified vitamin D deficiency. Past Surgical History:   has a past surgical history that includes Tonsillectomy and adenoidectomy; Coronary artery bypass graft; intubation (3/7/2022); Thoracentesis (3/10/2022); Upper gastrointestinal endoscopy (N/A, 3/15/2022); Colonoscopy (N/A, 3/15/2022); IR NONTUNNELED VASCULAR CATHETER > 5 YEARS (2022); IR TUNNELED CVC PLACE WO SQ PORT/PUMP > 5 YEARS (2022); and Upper gastrointestinal endoscopy (N/A, 2022). Overall Orientation Status: Within Functional Limits  Restrictions/Precautions  Restrictions/Precautions: General Precautions; Fall Risk  Required Braces or Orthoses?: Yes (boots)  Implants present? : Metal implants (Cardiac stents)  Required Braces or Orthoses  Right Lower Extremity Brace: Boot  RLE Brace Type: Unaboots  Left Lower Extremity Brace: Boot  LLE Brace Type: Unaboots  Position Activity Restriction  Other position/activity restrictions: up w/ assist    Subjective: Pt laying in bed upon arrival. Pt complaining about her breakfast consistently  Comments: SHERITA Beltran approved therapy; co-treat with RHODA Huggins       Pain Assessment: None - Denies Pain                Bed Mobility:   Bed Mobility  Rolling: Contact guard assistance  Supine to Sit: Minimal assistance  Sit to Supine: Unable to assess  Scooting: Moderate assistance  Comment: HOB flat  Bed mobility  Scooting: Moderate assistance    Transfers:  Sit to Stand: Minimal Assistance;Maximum Assistance;2 Person Assistance (Min A x2 from bed, Max A x2 from chaor)  Stand to sit: Minimal Assistance  Bed to Chair: Minimal assistance;2 Person Assistance              Ambulation  Comments: pt ambulated 4 steps FWD/BWD from bedside chair . Required verbal and tactile cueing for RW management        Stairs/Curb  Stairs?: No      EXERCISES    Other exercises?: Yes  Other exercises 1: bed mobility  Other exercises 2: seated EOB ~ 5 minutes SBA  Other exercises 3: STS x2 with RW  Other exercises 4: stadning tolerance ~1 minutes x2  Other exercises 5: stand pivot with RW from bed to bedside chair           Activity Tolerance: Patient limited by endurance, Patient tolerated treatment well  PT Equipment Recommendations  Equipment Needed:  (TBD)      Current Treatment Recommendations: Strengthening, Patient/Caregiver education & training, Endurance training, Balance training, Functional mobility training, Transfer training, Gait training, Safety education & training, Equipment evaluation, education, & procurement    Conditions Requiring Skilled Therapeutic Intervention  Body Structures, Functions, Activity Limitations Requiring Skilled Therapeutic Intervention: Decreased functional mobility ; Decreased endurance; Increased pain  Treatment Diagnosis: impaired mobility due to weakness and debilitation  History: pt admitted due to GI bleed  Discharge Recommendations: Patient would benefit from continued therapy after discharge     07/25/22 1300   AM-PAC Mobility without Stair Climbing Inpatient    How much difficulty turning over in bed? 3   How much difficulty sitting down on / standing up from a chair with arms? 1   How much difficulty moving from lying on back to sitting on side of bed? 3   How much help from another person moving to and from a bed to a chair? 1   How much help from another person needed to walk in hospital room?  1   AM-PAC Inpatient Mobility without Stair Climbing Raw Score  9   AM-PAC Inpatient without Stair Climbing T-Scale Score  32.44   Mobility Inpatient CMS 0-100% Score 76.07   Mobility Inpatient without Stair CMS G-Code Modifier  CL       Goals  Short Term Goals  Time Frame for Short term goals: 5-7 treatments/ week  Short term goal 1: pt to tolerate 1/2 hour of therapy keeping O2 sats above 90%  Short term goal 2: pt to demonstrate good technique w/ LE strengthenining exercises  Short term goal 3: pt to demonstrate rolling in bed either direction using rail w/ min x 1 for pressure relief  Short term goal 4: pt to advance to and demonstrate transfers supine <> sit w/ min x 2 w/ O2 as ordered  Short term goal 5: pt to advance to and demonstrate fair sitting balance at the EOB and increase sitting tolerance to 10 minutes w/ SBA x 1 w/ O2 as ordered  Additional Goals?: Yes  Short Term Goal 6: pt to advance to and demonstrate transfers sit <> stand and bed <> chair using wheeled walker  w/ min x 2 w/ O2 as ordered  Short Term Goal 7: pt to advance to and demonstrate gait 10-20' using wheeled walker  w/ min x 2 w/ O2 as ordered  Short Term Goal 8: pt to advance to and demonstrate fair or better standing balance using wheeled walker       07/25/22 1341   PT Individual Minutes   Time In 0826   Time Out 0850   Minutes 24       Electronically signed by Jorge Gonzalez PTA on 7/25/22 at 1:51 PM EDT

## 2022-07-25 NOTE — PROGRESS NOTES
Dr. Erica Alanis MD / Dr. Marko Murrieta MD / Dr. Nicholaus Rosebush MD Dr. Laymon Poles MD Mortimer Rich APRN CNP Laurinda Rakers CNP                  Pulmonary Consult Note   [facility]      Patient - Ezio Dubose,  Age - 66 y.o.    - 1950      Room Number - 6449/2413-39   N -  [id number]   Acct # - [de-identified]  Date of Admission -  2022 11:42 PM        Delmer Cook MD  Primary Care Physician - Reji Almanza MD     HPI   Patient is sitting up in the chair. She remains on room air. She denies any chest pain or shortness of breath. She is very hard of hearing. Family is at the bedside no concerns. Plans for skilled nursing facility today. OBJECTIVE   VITALS    height is 4' 11\" (1.499 m) and weight is 138 lb 0.1 oz (62.6 kg). Her axillary temperature is 98 °F (36.7 °C). Her blood pressure is 129/56 (abnormal) and her pulse is 75. Her respiration is 16 and oxygen saturation is 100%. Body mass index is 27.87 kg/m².   Temperature Range: Temp: 98 °F (36.7 °C) Temp  Av.8 °F (36.6 °C)  Min: 97.6 °F (36.4 °C)  Max: 98 °F (36.7 °C)  BP Range:  Systolic (95OHV), JUQ:795 , Min:115 , SHAWNA:656     Diastolic (21TRW), MARTINE:14, Min:48, Max:80    Pulse Range: Pulse  Av.6  Min: 75  Max: 99  Respiration Range: Resp  Av.5  Min: 16  Max: 18  Current Pulse Ox[de-identified]  SpO2: 100 %  24HR Pulse Ox Range:  SpO2  Av.6 %  Min: 94 %  Max: 100 %  Oxygen Amount and Delivery: O2 Flow Rate (L/min): 2 L/min    Wt Readings from Last 3 Encounters:   22 138 lb 0.1 oz (62.6 kg)   22 133 lb 9.6 oz (60.6 kg)   22 152 lb (68.9 kg)       I/O (24 Hours)    Intake/Output Summary (Last 24 hours) at 2022 0950  Last data filed at 2022 1814  Gross per 24 hour   Intake 600 ml   Output --   Net 600 ml       EXAM     General Appearance  Awake, alert, oriented, in no acute distress very hard of hearing  HEENT - normocephalic, atraumatic   Neck - Supple,  trachea midline   Lungs -nonlabored respirations. Lung sounds diminished posteriorly.   No wheezes rhonchi or rales noted  Cardiovascular - Heart sounds are normal.  Regular rate and rhythm   Abdomen - Soft, nontender, nondistended, no masses or organomegaly  Neurologic - There are no focal motor or sensory deficits  Skin - No bruising or bleeding  Extremities - No  cyanosis, edema    MEDS      pantoprazole  40 mg Oral QAM AC    metoprolol tartrate  25 mg Oral BID    ciprofloxacin  400 mg IntraVENous Q24H    tiotropium  2 puff Inhalation Daily    budesonide-formoterol  2 puff Inhalation BID    hydrocortisone  25 mg Rectal BID    QUEtiapine  25 mg Oral Nightly    levothyroxine  25 mcg Oral Daily    furosemide  80 mg Oral Daily    atorvastatin  40 mg Oral Nightly    amiodarone  100 mg Oral Daily    sodium chloride flush  5-40 mL IntraVENous 2 times per day      sodium chloride      sodium chloride      sodium chloride      sodium chloride      sodium chloride       potassium chloride **OR** potassium chloride, sodium chloride flush, sodium chloride, albuterol sulfate HFA, sodium chloride flush, sodium chloride, ondansetron **OR** ondansetron, acetaminophen **OR** acetaminophen, sodium chloride, sodium chloride, sodium chloride    LABS   CBC   Recent Labs     07/23/22  1011   WBC 5.1   HGB 8.1*   HCT 25.5*   MCV 94.7   *     BMP:   Lab Results   Component Value Date/Time     07/23/2022 10:11 AM    K 4.2 07/23/2022 10:11 AM     07/23/2022 10:11 AM    CO2 27 07/23/2022 10:11 AM    BUN 13 07/23/2022 10:11 AM    LABALBU 1.9 07/20/2022 12:30 AM    CREATININE 1.13 07/23/2022 10:11 AM    CALCIUM 7.7 07/23/2022 10:11 AM    GFRAA 57 07/23/2022 10:11 AM    LABGLOM 47 07/23/2022 10:11 AM     ABGs:  Lab Results   Component Value Date/Time    PHART 7.524 07/20/2022 04:08 PM    PO2ART 68.7 07/20/2022 04:08 PM    ERM1FIA 33.6 07/20/2022 04:08 PM      Lab Results   Component Value Date/Time    MODE PRVC 07/06/2022 04:40 AM     Ionized Calcium:  No results found for: IONCA  Magnesium:    Lab Results   Component Value Date/Time    MG 1.6 07/22/2022 04:37 AM     Phosphorus:    Lab Results   Component Value Date/Time    PHOS 3.1 07/13/2022 08:29 AM        LIVER PROFILE No results for input(s): AST, ALT, LIPASE, BILIDIR, BILITOT, ALKPHOS in the last 72 hours. Invalid input(s): AMYLASE,  ALB  INR No results for input(s): INR in the last 72 hours. PTT   Lab Results   Component Value Date    APTT 29.5 07/21/2022         RADIOLOGY     (See actual reports for details)    ASSESSMENT/PLAN     Altered mental status-resolved  GI bleed, status post EGD 7/21 which was unrevealing, had nuclear medicine scan which was positive but CT angiogram was negative-received 4 units of packed red blood cells 2 units of FFP  End-stage renal disease on dialysis  History of CVA  History of a right anterior temporal fossa meningioma  History of COPD  Chronic hypoxic respiratory failure, wears oxygen with exertion  History of atrial fibrillation  Coronary artery disease status post CABG in 2003  Recent episode of acute respiratory failure, extubated 7/6/2022 at [facility]  Type 2 diabetes       Plan:  Continue bronchodilators; she cannot afford expensive inhalers continue with current regimen  Fluid removal on dialysis per nephrology  Patient is stable from pulmonary standpoint she has an appointment in our office for 2 days which we canceled. I discussed with the office today to contact patient or her family to reschedule in 2 to 3 weeks she is going to skilled nursing facility [phone]  Will sign off.   Please notify her service we can be of any assistance  Discussed with nursing staff      Electronically signed by MAL Wheeler CNP on 7/25/2022 at 9:50 AM

## 2022-07-25 NOTE — PROGRESS NOTES
Dialysis Safety Checks:    Patient ID Verified (Y/N) Y     -Hepatitis Test                   Date      Result  Hepatitis B Surface Antigen   22 Negative     Hepatitis B Surface Antibody 22 Negative     Hepatitis B Core Antibody        -Treatment Initiation  Blood Vol Processed Goal (Liters)  Pump Speed x Treatment Hours x 60 Minutes    Target Fluid Removal 3000 ml     * Intra-treatment documented Safety Checks include the followin) Access and face visible at all times. 2) All connections and blood lines are secure with no kinks. 3) NVL alarm engaged. 4) Hemosafe device applied (if applicable). 5) No collapse of Arterial or Venous blood chambers. 6) All blood lines / pump segments in the air detectors.   --------------------------------------------------------------------------------   Report received from [address] [place] [address]

## 2022-07-26 VITALS
OXYGEN SATURATION: 100 % | HEART RATE: 82 BPM | RESPIRATION RATE: 20 BRPM | TEMPERATURE: 97.5 F | SYSTOLIC BLOOD PRESSURE: 135 MMHG | DIASTOLIC BLOOD PRESSURE: 57 MMHG | WEIGHT: 121.25 LBS | BODY MASS INDEX: 24.44 KG/M2 | HEIGHT: 59 IN

## 2022-07-26 LAB
ANION GAP SERPL CALCULATED.3IONS-SCNC: 7 MMOL/L (ref 9–17)
BUN BLDV-MCNC: 12 MG/DL (ref 8–23)
CALCIUM SERPL-MCNC: 7.5 MG/DL (ref 8.6–10.4)
CHLORIDE BLD-SCNC: 95 MMOL/L (ref 98–107)
CO2: 27 MMOL/L (ref 20–31)
CREAT SERPL-MCNC: 1.46 MG/DL (ref 0.5–0.9)
GFR AFRICAN AMERICAN: 43 ML/MIN
GFR NON-AFRICAN AMERICAN: 35 ML/MIN
GFR SERPL CREATININE-BSD FRML MDRD: ABNORMAL ML/MIN/{1.73_M2}
GLUCOSE BLD-MCNC: 148 MG/DL (ref 70–99)
HEMOGLOBIN: 8.8 G/DL (ref 12–16)
POTASSIUM SERPL-SCNC: 4 MMOL/L (ref 3.7–5.3)
SERUM OSMOLALITY: 283 MOSM/KG (ref 275–295)
SODIUM BLD-SCNC: 129 MMOL/L (ref 135–144)

## 2022-07-26 PROCEDURE — 2580000003 HC RX 258: Performed by: INTERNAL MEDICINE

## 2022-07-26 PROCEDURE — 6370000000 HC RX 637 (ALT 250 FOR IP): Performed by: INTERNAL MEDICINE

## 2022-07-26 PROCEDURE — 36415 COLL VENOUS BLD VENIPUNCTURE: CPT

## 2022-07-26 PROCEDURE — 6370000000 HC RX 637 (ALT 250 FOR IP): Performed by: NURSE PRACTITIONER

## 2022-07-26 PROCEDURE — 83930 ASSAY OF BLOOD OSMOLALITY: CPT

## 2022-07-26 PROCEDURE — 6370000000 HC RX 637 (ALT 250 FOR IP)

## 2022-07-26 PROCEDURE — 94640 AIRWAY INHALATION TREATMENT: CPT

## 2022-07-26 PROCEDURE — 85018 HEMOGLOBIN: CPT

## 2022-07-26 PROCEDURE — 94761 N-INVAS EAR/PLS OXIMETRY MLT: CPT

## 2022-07-26 PROCEDURE — 6360000002 HC RX W HCPCS: Performed by: STUDENT IN AN ORGANIZED HEALTH CARE EDUCATION/TRAINING PROGRAM

## 2022-07-26 PROCEDURE — 80048 BASIC METABOLIC PNL TOTAL CA: CPT

## 2022-07-26 RX ADMIN — CIPROFLOXACIN 400 MG: 2 INJECTION, SOLUTION INTRAVENOUS at 08:31

## 2022-07-26 RX ADMIN — FUROSEMIDE 80 MG: 40 TABLET ORAL at 08:25

## 2022-07-26 RX ADMIN — AMIODARONE HYDROCHLORIDE 100 MG: 100 TABLET ORAL at 08:27

## 2022-07-26 RX ADMIN — PANTOPRAZOLE SODIUM 40 MG: 40 TABLET, DELAYED RELEASE ORAL at 05:20

## 2022-07-26 RX ADMIN — LEVOTHYROXINE SODIUM 25 MCG: 0.03 TABLET ORAL at 05:20

## 2022-07-26 RX ADMIN — TIOTROPIUM BROMIDE INHALATION SPRAY 2 PUFF: 3.12 SPRAY, METERED RESPIRATORY (INHALATION) at 07:17

## 2022-07-26 RX ADMIN — ACETAMINOPHEN 325MG 650 MG: 325 TABLET ORAL at 10:00

## 2022-07-26 RX ADMIN — Medication 2 PUFF: at 07:17

## 2022-07-26 RX ADMIN — METOPROLOL TARTRATE 25 MG: 25 TABLET, FILM COATED ORAL at 08:25

## 2022-07-26 RX ADMIN — SODIUM CHLORIDE, PRESERVATIVE FREE 10 ML: 5 INJECTION INTRAVENOUS at 08:26

## 2022-07-26 ASSESSMENT — PAIN DESCRIPTION - LOCATION: LOCATION: COCCYX

## 2022-07-26 ASSESSMENT — PAIN SCALES - GENERAL: PAINLEVEL_OUTOF10: 4

## 2022-07-26 NOTE — PROGRESS NOTES
IM INTERVAL PROGRESS NOTE  S  Seen and examined the patient at bedside. Patient reports that she is doing well. Tolerating food no nausea no vomiting. Denies any chest pain or shortness of breath. Family around bedside plan for discharge today at 1030 this morning. O  Cardiac: Regular rate and rhythm  Pulmonary: Clear to auscultation bilaterally. GI: Bowel sounds present, soft  HEENT: Head normocephalic atraumatic  Alert and oriented x3    A&P  Plan for discharge today to [facility] at 1030 this morning.       Mika Ibarra MD  PGY I TY Resident  7/26/2022 9:09 AM

## 2022-07-26 NOTE — PLAN OF CARE
Problem: Discharge Planning  Goal: Discharge to home or other facility with appropriate resources  7/26/2022 0427 by Mauricio Joshua RN  Outcome: Progressing Towards Goal     Problem: Skin/Tissue Integrity  Goal: Absence of new skin breakdown  Description: 1. Monitor for areas of redness and/or skin breakdown  2. Assess vascular access sites hourly  3. Every 4-6 hours minimum:  Change oxygen saturation probe site  4. Every 4-6 hours:  If on nasal continuous positive airway pressure, respiratory therapy assess nares and determine need for appliance change or resting period.   7/26/2022 0427 by Mauricio Joshua RN  Outcome: Progressing Towards Goal     Problem: Safety - Adult  Goal: Free from fall injury  7/26/2022 0427 by Mauricio Joshua RN  Outcome: Progressing Towards Goal     Problem: Pain  Goal: Verbalizes/displays adequate comfort level or baseline comfort level  7/26/2022 0427 by Mauricio Joshua RN  Outcome: Progressing Towards Goal

## 2022-07-26 NOTE — PROGRESS NOTES
Department of Internal Medicine  Nephrology Rosemary Ha MD  Progress Note    Reason for consultation: Management of hemodialysis dependent new onset end-stage renal disease. Consulting physician: Nanette Herzog MD.    Interval history: Patient is hard of hearing but is not in respiratory distress. She does not have any new complaints. She is comfortable at rest and awake, alert and oriented. History of present illness: This is a 66 y.o. female with a significant past medical history of Cerebrovascular accident, combined diastolic and systolic heart failure, essential hypertension, partial deafness and chronic kidney disease stage IV [associated with nephrotic range proteinuria [16 g/g], who was admitted to [facility] from 6/27/22 to 7/18/2022 for treatment of hypoxia and bradycardia. She required endotracheal intubation and was extubated on 7/6/2022. Acute hemodialysis was started during that admission for management of worsening azotemia associated with edema and tunneled hemodialysis catheter was eventually placed on 7/8/2022. Patient was on a TTS hemodialysis schedule but was sent to the [facility] where she was supposed to be starting a MWF hemodialysis schedule. Within 24 hours of getting to the [facility], she developed black tarry stools and was sent to the hospital and admitted for GI bleed with hemoglobin 4.9 g/dL yesterday. She has remained relatively hemodynamically stable with systolic blood pressure greater than 100 mmHg. She is completing the first of 2 scheduled units of packed red blood transfusions and is due to be evaluated by GI service. She is hard of hearing but alert and oriented.     Scheduled Meds:   pantoprazole  40 mg Oral QAM AC    metoprolol tartrate  25 mg Oral BID    ciprofloxacin  400 mg IntraVENous Q24H    tiotropium  2 puff Inhalation Daily    budesonide-formoterol  2 puff Inhalation BID    hydrocortisone  25 mg Rectal BID    QUEtiapine  25 mg Oral Nightly levothyroxine  25 mcg Oral Daily    furosemide  80 mg Oral Daily    atorvastatin  40 mg Oral Nightly    amiodarone  100 mg Oral Daily    sodium chloride flush  5-40 mL IntraVENous 2 times per day     Continuous Infusions:   sodium chloride      sodium chloride      sodium chloride      sodium chloride      sodium chloride       PRN Meds:.potassium chloride **OR** potassium chloride, sodium chloride flush, sodium chloride, albuterol sulfate HFA, sodium chloride flush, sodium chloride, ondansetron **OR** ondansetron, acetaminophen **OR** acetaminophen, sodium chloride, sodium chloride, sodium chloride    Physical Exam:    VITALS:  BP (!) 135/57   Pulse 82   Temp 97.5 °F (36.4 °C) (Oral)   Resp 20   Ht 4' 11\" (1.499 m)   Wt 121 lb 4.1 oz (55 kg)   SpO2 100%   BMI 24.49 kg/m²   TEMPERATURE:  Current - Temp: 97.5 °F (36.4 °C); Max - Temp  Av.1 °F (36.7 °C)  Min: 97.5 °F (36.4 °C)  Max: 98.6 °F (37 °C)  RESPIRATIONS RANGE: Resp  Av.7  Min: 16  Max: 20  PULSE RANGE: Pulse  Av.7  Min: 67  Max: 103  BLOOD PRESSURE RANGE:  Systolic (91OWJ), GXC:980 , Min:109 , MHI:278 ; Diastolic (01YOU), GLM:07, Min:46, Max:81  PULSE OXIMETRY RANGE: SpO2  Av %  Min: 92 %  Max: 100 %  24HR INTAKE/OUTPUT:    Intake/Output Summary (Last 24 hours) at 2022 0951  Last data filed at 2022 1657  Gross per 24 hour   Intake 700 ml   Output 3000 ml   Net -2300 ml       Constitutional: alert, appears stated age, and cooperative    Skin: Skin color, texture, turgor normal. No rashes or lesions    Head: Normocephalic, without obvious abnormality, atraumatic     Cardiovascular/Edema: regular rate and rhythm, S1, S2 normal, no murmur, click, rub or gallop    Respiratory: Lungs: clear to auscultation bilaterally    Abdomen: soft, non-tender; bowel sounds normal; no masses,  no organomegaly    Back: symmetric, no curvature. ROM normal. No CVA tenderness.     Extremities: extremities normal, atraumatic, no cyanosis or edema    Neuro:  Grossly normal    CBC:   Recent Labs     07/23/22  1011 07/25/22  1347   WBC 5.1 5.5   HGB 8.1* 9.2*   * 161       BMP:    Recent Labs     07/23/22  1011 07/25/22  1347 07/26/22  0531    133* 129*   K 4.2 4.2 4.0    98 95*   CO2 27 27 27   BUN 13 26* 12   CREATININE 1.13* 2.20* 1.46*   GLUCOSE 150* 99 148*       Lab Results   Component Value Date/Time    NITRU NEGATIVE 07/20/2022 06:00 PM    COLORU Dark Yellow 07/20/2022 06:00 PM    PHUR 5.5 07/20/2022 06:00 PM    WBCUA 21 TO 50 07/20/2022 06:00 PM    RBCUA 51  07/20/2022 06:00 PM    MUCUS 1+ 06/27/2022 06:35 PM    TRICHOMONAS NOT REPORTED 02/07/2022 02:42 AM    YEAST NOT REPORTED 02/07/2022 02:42 AM    BACTERIA MANY 07/20/2022 06:00 PM    SPECGRAV 1.016 07/20/2022 06:00 PM    LEUKOCYTESUR MOD 07/20/2022 06:00 PM    UROBILINOGEN Normal 07/20/2022 06:00 PM    BILIRUBINUR NEGATIVE 07/20/2022 06:00 PM    GLUCOSEU TRACE 07/20/2022 06:00 PM    KETUA TRACE 07/20/2022 06:00 PM    AMORPHOUS 1+ 07/20/2022 06:00 PM     Urine Sodium:     Lab Results   Component Value Date/Time    SLOAN <20 06/08/2022 09:52 PM     Urine Protein:     Lab Results   Component Value Date/Time     10/27/2020 04:35 PM     Urine Creatinine:     Lab Results   Component Value Date/Time    LABCREA 63.0 06/08/2022 09:52 PM     IMPRESSION/RECOMMENDATIONS:    1.  End-stage renal disease - Secondary to diabetic nephropathy. We will place patient on a Monday/Wednesday/Friday hemodialysis schedule. She has been on dialysis for approximately 1 month but GFR appears to be improving and we will monitor for urine output closely. We will switch dialysis to twice a week, i.e.; Mondays and Fridays. Monitor urine output closely. 2.  Systemic hypertension - blood pressure control is adequate. 3.  Anemia secondary to GI bleed. Patient however has underlying anemia of chronic kidney disease.     4.  Hyponatremia - will emphasize fluid restriction 1500 mL/day and check serum osmolality. Prognosis for renal function recovery is good. I had prolonged discussion with patient's significant other who was present throughout the encounter.     Sadi Reddy MD   Attending Nephrologist  7/26/2022 9:51 AM

## 2022-07-26 NOTE — PROGRESS NOTES
RN tried to call report to "[employer]" [facility]. [facility] disconnected two different times and the third time there was no answer. Patient is due to be picked up anytime.

## 2022-07-29 ENCOUNTER — TELEPHONE (OUTPATIENT)
Dept: GASTROENTEROLOGY | Age: 72
End: 2022-07-29

## 2022-07-29 ENCOUNTER — HOSPITAL ENCOUNTER (EMERGENCY)
Age: 72
Discharge: HOME | End: 2022-07-29
Attending: EMERGENCY MEDICINE
Payer: MEDICARE

## 2022-07-29 ENCOUNTER — HOSPITAL ENCOUNTER (OUTPATIENT)
Age: 72
Setting detail: SPECIMEN
Discharge: HOME | End: 2022-07-29

## 2022-07-29 VITALS
HEIGHT: 59 IN | HEART RATE: 90 BPM | BODY MASS INDEX: 24.39 KG/M2 | SYSTOLIC BLOOD PRESSURE: 136 MMHG | WEIGHT: 121 LBS | OXYGEN SATURATION: 96 % | DIASTOLIC BLOOD PRESSURE: 67 MMHG | RESPIRATION RATE: 18 BRPM | TEMPERATURE: 97.9 F

## 2022-07-29 DIAGNOSIS — E83.42 HYPOMAGNESEMIA: Primary | ICD-10-CM

## 2022-07-29 LAB
ABSOLUTE EOS #: 0.12 K/UL (ref 0–0.4)
ABSOLUTE LYMPH #: 1.33 K/UL (ref 1–4.8)
ABSOLUTE MONO #: 0.75 K/UL (ref 0.1–1.3)
ALBUMIN SERPL-MCNC: 2.5 G/DL (ref 3.5–5.2)
ALP BLD-CCNC: 95 U/L (ref 35–104)
ALT SERPL-CCNC: 9 U/L (ref 5–33)
ANION GAP SERPL CALCULATED.3IONS-SCNC: 10 MMOL/L (ref 9–17)
AST SERPL-CCNC: 19 U/L
BACTERIA: NORMAL
BASOPHILS # BLD: 1 % (ref 0–2)
BASOPHILS ABSOLUTE: 0.06 K/UL (ref 0–0.2)
BILIRUB SERPL-MCNC: 0.42 MG/DL (ref 0.3–1.2)
BILIRUBIN URINE: NEGATIVE
BUN BLDV-MCNC: 33 MG/DL (ref 8–23)
CALCIUM SERPL-MCNC: 7.7 MG/DL (ref 8.6–10.4)
CASTS UA: NORMAL /LPF
CHLORIDE BLD-SCNC: 102 MMOL/L (ref 98–107)
CO2: 24 MMOL/L (ref 20–31)
COLOR: YELLOW
CREAT SERPL-MCNC: 2.83 MG/DL (ref 0.5–0.9)
EOSINOPHILS RELATIVE PERCENT: 2 % (ref 0–4)
EPITHELIAL CELLS UA: NORMAL /HPF
GFR AFRICAN AMERICAN: 20 ML/MIN
GFR NON-AFRICAN AMERICAN: 16 ML/MIN
GFR SERPL CREATININE-BSD FRML MDRD: ABNORMAL ML/MIN/{1.73_M2}
GLUCOSE BLD-MCNC: 103 MG/DL (ref 70–99)
GLUCOSE URINE: NEGATIVE
HCT VFR BLD CALC: 25.6 % (ref 36–46)
HEMOGLOBIN: 8.3 G/DL (ref 12–16)
KETONES, URINE: NEGATIVE
LEUKOCYTE ESTERASE, URINE: ABNORMAL
LYMPHOCYTES # BLD: 23 % (ref 24–44)
MAGNESIUM: 1.4 MG/DL (ref 1.6–2.6)
MCH RBC QN AUTO: 30.6 PG (ref 26–34)
MCHC RBC AUTO-ENTMCNC: 32.3 G/DL (ref 31–37)
MCV RBC AUTO: 94.6 FL (ref 80–100)
MONOCYTES # BLD: 13 % (ref 1–7)
MORPHOLOGY: ABNORMAL
MORPHOLOGY: ABNORMAL
NITRITE, URINE: NEGATIVE
PDW BLD-RTO: 18.8 % (ref 11.5–14.9)
PH UA: 7 (ref 5–8)
PLATELET # BLD: 229 K/UL (ref 150–450)
PMV BLD AUTO: 7 FL (ref 6–12)
POTASSIUM SERPL-SCNC: 5 MMOL/L (ref 3.7–5.3)
PROTEIN UA: ABNORMAL
RBC # BLD: 2.7 M/UL (ref 4–5.2)
RBC UA: NORMAL /HPF
SEG NEUTROPHILS: 61 % (ref 36–66)
SEGMENTED NEUTROPHILS ABSOLUTE COUNT: 3.54 K/UL (ref 1.3–9.1)
SODIUM BLD-SCNC: 136 MMOL/L (ref 135–144)
SPECIFIC GRAVITY UA: 1.01 (ref 1–1.03)
TOTAL PROTEIN: 4.9 G/DL (ref 6.4–8.3)
TURBIDITY: CLEAR
URINE HGB: ABNORMAL
UROBILINOGEN, URINE: NORMAL
WBC # BLD: 5.8 K/UL (ref 3.5–11)
WBC UA: NORMAL /HPF

## 2022-07-29 PROCEDURE — 81001 URINALYSIS AUTO W/SCOPE: CPT

## 2022-07-29 PROCEDURE — 87186 SC STD MICRODIL/AGAR DIL: CPT

## 2022-07-29 PROCEDURE — 96365 THER/PROPH/DIAG IV INF INIT: CPT

## 2022-07-29 PROCEDURE — 2500000003 HC RX 250 WO HCPCS: Performed by: EMERGENCY MEDICINE

## 2022-07-29 PROCEDURE — 36415 COLL VENOUS BLD VENIPUNCTURE: CPT

## 2022-07-29 PROCEDURE — 83735 ASSAY OF MAGNESIUM: CPT

## 2022-07-29 PROCEDURE — 87077 CULTURE AEROBIC IDENTIFY: CPT

## 2022-07-29 PROCEDURE — 99284 EMERGENCY DEPT VISIT MOD MDM: CPT

## 2022-07-29 PROCEDURE — 96366 THER/PROPH/DIAG IV INF ADDON: CPT

## 2022-07-29 PROCEDURE — 80053 COMPREHEN METABOLIC PANEL: CPT

## 2022-07-29 PROCEDURE — 85025 COMPLETE CBC W/AUTO DIFF WBC: CPT

## 2022-07-29 PROCEDURE — 87086 URINE CULTURE/COLONY COUNT: CPT

## 2022-07-29 RX ORDER — MAGNESIUM SULFATE HEPTAHYDRATE 40 MG/ML
2000 INJECTION, SOLUTION INTRAVENOUS ONCE
Status: COMPLETED | OUTPATIENT
Start: 2022-07-29 | End: 2022-07-29

## 2022-07-29 RX ADMIN — MAGNESIUM SULFATE HEPTAHYDRATE 2000 MG: 40 INJECTION, SOLUTION INTRAVENOUS at 02:42

## 2022-07-29 ASSESSMENT — ENCOUNTER SYMPTOMS
NAUSEA: 0
EYE PAIN: 0
WHEEZING: 0
BACK PAIN: 0
FACIAL SWELLING: 0
TROUBLE SWALLOWING: 0
COUGH: 0
CHEST TIGHTNESS: 0
EYE REDNESS: 0
SORE THROAT: 0
COLOR CHANGE: 0
DIARRHEA: 0
SINUS PRESSURE: 0
ABDOMINAL PAIN: 0
BLOOD IN STOOL: 0
SHORTNESS OF BREATH: 0
VOMITING: 0
CONSTIPATION: 0
EYE DISCHARGE: 0
RHINORRHEA: 0

## 2022-07-29 ASSESSMENT — PAIN - FUNCTIONAL ASSESSMENT: PAIN_FUNCTIONAL_ASSESSMENT: NONE - DENIES PAIN

## 2022-07-29 NOTE — ED PROVIDER NOTES
[address] ED  eMERGENCY dEPARTMENT eNCOUnter      Pt Name: Justice Hull  MRN: [id number]  [place] 1950  Date of evaluation: 7/29/22      CHIEF COMPLAINT       Chief Complaint   Patient presents with    Altered Mental Status         HISTORY OF PRESENT ILLNESS    Justice Hull is a 66 y.o. female who presents complaining of need for evaluation. Patient was brought in by EMS for what they stated family was concerned that she has been complaining of her stomach on fire and altered mental status. Reportedly they were saying that she was seeing things out in the hallway that were not really there. Wife get further history from them. Patient is stating that she is not having any pain that she does not have any shortness of breath she has not been vomiting and otherwise feels okay. Patient was just recently in the hospital for melena and had an upper GI done that was negative for bleeding. Patient states that she feels fine. REVIEW OF SYSTEMS       Review of Systems   Constitutional:  Negative for activity change, appetite change, chills, diaphoresis and fever. HENT:  Negative for congestion, ear pain, facial swelling, nosebleeds, rhinorrhea, sinus pressure, sore throat and trouble swallowing. Eyes:  Negative for pain, discharge and redness. Respiratory:  Negative for cough, chest tightness, shortness of breath and wheezing. Cardiovascular:  Negative for chest pain, palpitations and leg swelling. Gastrointestinal:  Negative for abdominal pain, blood in stool, constipation, diarrhea, nausea and vomiting. Genitourinary:  Negative for difficulty urinating, dysuria, flank pain, frequency, genital sores and hematuria. Musculoskeletal:  Negative for arthralgias, back pain, gait problem, joint swelling, myalgias and neck pain. Skin:  Negative for color change, pallor, rash and wound.    Neurological:  Negative for dizziness, tremors, seizures, syncope, speech difficulty, weakness, numbness and headaches. Psychiatric/Behavioral:  Negative for confusion, decreased concentration, hallucinations, self-injury, sleep disturbance and suicidal ideas.       PAST MEDICAL HISTORY     Past Medical History:   Diagnosis Date    Acute CVA (cerebrovascular accident) ([facility] 07/25/2020    Acute on chronic combined systolic (congestive) and diastolic (congestive) heart failure ([facility] 02/06/2022    Arthritis     Asthma     Bradycardia 06/12/2022    ESRD (end stage renal disease) ([facility]     Essential hypertension 07/25/2020    Hallucinations 02/18/2021    Hard of hearing     Head contusion 09/09/2020    Iron deficiency anemia, unspecified     Meningioma ([facility] 02/25/2021    [facility]     Pure hypercholesterolemia     Type 2 diabetes mellitus with stage 4 chronic kidney disease, without long-term current use of insulin (HCC)     Unspecified venous (peripheral) insufficiency     Unspecified vitamin D deficiency        SURGICAL HISTORY       Past Surgical History:   Procedure Laterality Date    COLONOSCOPY N/A 3/15/2022    COLONOSCOPY DIAGNOSTIC performed by Lidia Malcolm MD at [facility]      x 3, Dr. Johanny Sun  3/7/2022         IR NONTUNNELED VASCULAR CATHETER  6/28/2022    IR NONTUNNELED VASCULAR CATHETER 6/28/2022 [facility] Mcneil [facility]    IR TUNNELED CATHETER PLACEMENT GREATER THAN 5 YEARS  7/8/2022    IR TUNNELED CATHETER PLACEMENT GREATER THAN 5 YEARS 7/8/2022 [facility]    THORACENTESIS  3/10/2022         TONSILLECTOMY AND ADENOIDECTOMY      UPPER GASTROINTESTINAL ENDOSCOPY N/A 3/15/2022    EGD BIOPSY performed by Lidia Malcolm MD at [address] [place] [address] 7/21/2022    EGD ESOPHAGOGASTRODUODENOSCOPY performed by Hayden Leung MD at [address] [place] [address]       Current Discharge Medication List        CONTINUE these medications which have NOT CHANGED    Details   aspirin EC 81 MG EC tablet Take 1 tablet by mouth in the morning. Qty: 30 tablet, Refills: 0      tiotropium (SPIRIVA RESPIMAT) 2.5 MCG/ACT AERS inhaler Inhale 2 puffs into the lungs every morning (before breakfast)  Qty: 2 each, Refills: 1      ciprofloxacin (CIPRO) 500 MG tablet Take 1 tablet by mouth in the morning and 1 tablet before bedtime. Do all this for 5 days. Qty: 10 tablet, Refills: 0      QUEtiapine (SEROQUEL) 25 MG tablet Take 1 tablet by mouth nightly  Qty: 30 tablet, Refills: 0      furosemide (LASIX) 80 MG tablet Take 1 tablet by mouth in the morning. Qty: 60 tablet, Refills: 3      ferrous sulfate (IRON 325) 325 (65 Fe) MG tablet Take 1 tablet by mouth in the morning and at bedtime  Qty: 60 tablet, Refills: 0    Associated Diagnoses: Severe anemia; Iron deficiency anemia, unspecified iron deficiency anemia type      ascorbic acid (VITAMIN C) 500 MG tablet Take 1 tablet by mouth in the morning and 1 tablet before bedtime. Qty: 30 tablet, Refills: 3      pantoprazole (PROTONIX) 40 MG tablet Take 1 tablet by mouth every morning (before breakfast)  Qty: 30 tablet, Refills: 3      epoetin rick-epbx (RETACRIT) 99416 UNIT/ML SOLN injection Inject 0.5 mLs into the skin three times a week  Qty: 6.6 mL      amiodarone (PACERONE) 100 MG tablet Take 1 tablet by mouth daily  Qty: 30 tablet, Refills: 0      hydrocortisone (ANUSOL-HC) 25 MG suppository Place 1 suppository rectally 2 times daily      polyethylene glycol (GLYCOLAX) 17 g packet Take 17 g by mouth 2 times daily  Qty: 527 g, Refills: 1      levothyroxine (SYNTHROID) 25 MCG tablet Take 1 tablet by mouth Daily  Qty: 30 tablet, Refills: 3      metoprolol tartrate (LOPRESSOR) 25 MG tablet Take 1 tablet by mouth 2 times daily  Qty: 60 tablet, Refills: 3    Associated Diagnoses: Chronic diastolic CHF (congestive heart failure) ([facility]  Paroxysmal atrial fibrillation (HCC); CKD stage 4 secondary to hypertension (HCC)      albuterol sulfate HFA (PROAIR HFA) 108 (90 Base) MCG/ACT inhaler Inhale 2 puffs into the lungs every 6 hours as needed for Wheezing or Shortness of Breath (cough)  Qty: 18 g, Refills: 3    Associated Diagnoses: Chronic obstructive pulmonary disease, unspecified COPD type ([facility])      fluticasone-salmeterol (ADVAIR HFA) 115-21 MCG/ACT inhaler Inhale 2 puffs into the lungs 2 times daily Maintenance inhaler  Qty: 36 g, Refills: 3    Associated Diagnoses: Chronic obstructive pulmonary disease, unspecified COPD type ([facility])      ! ! Blood Pressure KIT Diagnosis: HTN. Needs to check blood pressure 1-2 times a day until stable, then once a day. Goal blood pressure less than 135/85, and above 110/60. Qty: 1 kit, Refills: 0    Associated Diagnoses: Chronic diastolic CHF (congestive heart failure) ([facility]); Paroxysmal atrial fibrillation ([facility]); Benign hypertension with CKD (chronic kidney disease) stage III ([facility])      vitamin D (ERGOCALCIFEROL) 1.25 MG (69548 UT) CAPS capsule Take 1 capsule by mouth once a week Sundays  Qty: 12 capsule, Refills: 0    Associated Diagnoses: Vitamin D deficiency      Nebulizers (COMPRESSOR/NEBULIZER) MISC Nebulizer with compressor. Disposable Med Nebs 2 /month. Reusable Med Nebs 1 per 6 months. Aerosol Mask 1 per month. Replacement Filters 2 per month. All other related supplies as needed per month. Medications being used: Albuterol . 083 unit dose vial. Frequency: every 6 hrs x 4/day . Length of Need: 1  Qty: 1 each, Refills: 3    Associated Diagnoses: Chronic obstructive pulmonary disease, unspecified COPD type ([facility])      Misc. Devices (ADJUST FOLD CANE/YORK HANDLE) MISC Use daily for walking  Qty: 1 each, Refills: 0    Associated Diagnoses: Risk for falls      Handicap Placard MISC by Does not apply route Expires on 12/30/25  Qty: 1 each, Refills: 0    Associated Diagnoses: Type 2 diabetes mellitus with stage 3a chronic kidney disease, without long-term current use of insulin ([facility]);  Acute on chronic diastolic CHF (congestive heart failure) ([facility]); Chronic obstructive pulmonary disease, unspecified COPD type ([facility] Risk for falls      atorvastatin (LIPITOR) 40 MG tablet Take 1 tablet by mouth once daily  Qty: 90 tablet, Refills: 3    Associated Diagnoses: Hyperlipidemia with target LDL less than 70      desloratadine (CLARINEX) 5 MG tablet Take 1 tablet by mouth once daily  Qty: 90 tablet, Refills: 3    Associated Diagnoses: Allergic rhinitis due to other allergic trigger, unspecified seasonality      FreeStyle Lancets MISC 1 each by Does not apply route daily Patient needs to contact office before any further refills will be approved  Qty: 90 each, Refills: 3    Associated Diagnoses: Type 2 diabetes mellitus with stage 3 chronic kidney disease, without long-term current use of insulin ([facility])      magnesium oxide (MAG-OX) 400 (241.3 Mg) MG TABS tablet Take 1 tablet by mouth twice daily  Qty: 180 tablet, Refills: 3    Associated Diagnoses: Benign hypertension with CKD (chronic kidney disease) stage III ([facility])      miconazole (MICOTIN) 2 % powder Apply topically 2 times daily UNDER THE SKIN FOLDS LONG TERM  Qty: 45 g, Refills: 1    Associated Diagnoses: Candidal intertrigo      Multiple Vitamins-Minerals (THERAPEUTIC MULTIVITAMIN-MINERALS) tablet Take 1 tablet by mouth daily  Qty: 90 tablet, Refills: 3    Associated Diagnoses: Type 2 diabetes mellitus with stage 3 chronic kidney disease, without long-term current use of insulin ([facility] Benign hypertension with CKD (chronic kidney disease) stage III ([facility])      blood glucose test strips (FREESTYLE LITE) strip 1 each by In Vitro route daily As needed. Qty: 100 each, Refills: 3    Associated Diagnoses: Type 2 diabetes mellitus with complication ([facility]      ! ! Blood Pressure KIT 1 kit by Does not apply route three times daily  Qty: 1 kit, Refills: 0    Associated Diagnoses: Essential hypertension, malignant      blood glucose monitor kit and supplies 1 kit by Other route three times daily Dispense Butterfly Elite CBG Device  Qty: 1 kit, Refills: 0    Comments: E11.9  Associated Diagnoses: Type 2 diabetes mellitus with complication, unspecified whether long term insulin use       !! - Potential duplicate medications found. Please discuss with provider. ALLERGIES     is allergic to latex, aleve [naproxen sodium], pioglitazone, claritin [loratadine], keflex [cephalexin], and lisinopril. SOCIAL HISTORY      reports that she quit smoking about 22 years ago. She has a 2.50 pack-year smoking history. She has never used smokeless tobacco. She reports that she does not currently use alcohol. She reports that she does not use drugs. PHYSICAL EXAM     INITIAL VITALS: /67   Pulse 90   Temp 97.9 °F (36.6 °C) (Oral)   Resp 18   Ht 4' 11\" (1.499 m)   Wt 121 lb (54.9 kg)   SpO2 96%   BMI 24.44 kg/m²      Physical Exam  Vitals and nursing note reviewed. Constitutional:       General: She is not in acute distress. Appearance: She is well-developed. She is not diaphoretic. HENT:      Head: Normocephalic and atraumatic. Eyes:      General: No scleral icterus. Right eye: No discharge. Left eye: No discharge. Conjunctiva/sclera: Conjunctivae normal.      Pupils: Pupils are equal, round, and reactive to light. Cardiovascular:      Rate and Rhythm: Normal rate and regular rhythm. Heart sounds: Normal heart sounds. No murmur heard. No friction rub. No gallop. Pulmonary:      Effort: Pulmonary effort is normal. No respiratory distress. Breath sounds: Normal breath sounds. No wheezing or rales. Chest:      Chest wall: No tenderness. Abdominal:      General: Bowel sounds are normal. There is no distension. Palpations: Abdomen is soft. There is no mass. Tenderness: There is no abdominal tenderness. There is no guarding or rebound. Musculoskeletal:         General: No tenderness. Normal range of motion. Skin:     General: Skin is warm and dry.       Coloration: Skin is not pale. Findings: No erythema or rash. Neurological:      Mental Status: She is alert and oriented to person, place, and time. Cranial Nerves: No cranial nerve deficit. Sensory: No sensory deficit. Motor: No weakness or abnormal muscle tone. Coordination: Coordination normal.      Deep Tendon Reflexes: Reflexes normal.   Psychiatric:         Behavior: Behavior normal.         Thought Content: Thought content normal.         Judgment: Judgment normal.       DIAGNOSTIC RESULTS     RADIOLOGY:All plain film, CT,MRI, and formal ultrasound images (except ED bedside ultrasound) are read by the radiologist and the interpretations are directly viewed by the emergency physician. No results found. LABS: All lab results were reviewed by myself, and all abnormals are listed below.   Labs Reviewed   CBC WITH AUTO DIFFERENTIAL - Abnormal; Notable for the following components:       Result Value    RBC 2.70 (*)     Hemoglobin 8.3 (*)     Hematocrit 25.6 (*)     RDW 18.8 (*)     Lymphocytes 23 (*)     Monocytes 13 (*)     All other components within normal limits   COMPREHENSIVE METABOLIC PANEL - Abnormal; Notable for the following components:    Glucose 103 (*)     BUN 33 (*)     Creatinine 2.83 (*)     Calcium 7.7 (*)     Total Protein 4.9 (*)     Albumin 2.5 (*)     GFR Non- 16 (*)     GFR  20 (*)     All other components within normal limits   URINALYSIS WITH REFLEX TO CULTURE - Abnormal; Notable for the following components:    Urine Hgb TRACE (*)     Protein, UA 3+ (*)     Leukocyte Esterase, Urine SMALL (*)     All other components within normal limits   MAGNESIUM - Abnormal; Notable for the following components:    Magnesium 1.4 (*)     All other components within normal limits   CULTURE, URINE   MICROSCOPIC URINALYSIS         MEDICAL DECISION MAKING:     Left have further discussion with the family but patient is alert and oriented answering all questions appropriately and denying any complaints. Patient is dialysis patient supposed to have dialysis today so we will check some labs check a urine if she makes any urine and do further evaluation at that point. EMERGENCY DEPARTMENT COURSE:   Vitals:    Vitals:    07/29/22 0047   BP: 136/67   Pulse: 90   Resp: 18   Temp: 97.9 °F (36.6 °C)   TempSrc: Oral   SpO2: 96%   Weight: 121 lb (54.9 kg)   Height: 4' 11\" (1.499 m)       The patient was given the following medications while in the emergency department:  Orders Placed This Encounter   Medications    magnesium sulfate 2000 mg in water 50 mL IVPB       -------------------------  2:39 AM EDT  Patient and family were updated on the results. I spoke with Dr. Anuel Bui for nephrology who would like me to give her 2 g of magnesium and then she is okay to be discharged back to the nursing home to get dialysis done later today. CONSULTS:  IP CONSULT TO NEPHROLOGY    PROCEDURES:  None    FINAL IMPRESSION      1.  Hypomagnesemia          DISPOSITION/PLAN   DISPOSITION Decision To Discharge 07/29/2022 04:05:24 AM      PATIENT REFERREDTO:  Maximo Obrien MD  [address] [facility].  [address] [place] [address]  [phone]    In 1 week      [facility] ED  [facility] [address] [place] [address] [address] [address]  [phone]    If symptoms worsen    DISCHARGEMEDICATIONS:  Current Discharge Medication List          (Please note that portions of this note were completed with a voice recognition program.  Efforts were made to edit thedictations but occasionally words are mis-transcribed.)    Austin Valerio MD  Attending Emergency Physician                        Austin Valerio MD  07/29/22 2319

## 2022-07-29 NOTE — DISCHARGE INSTRUCTIONS
Thank you for visiting [address]. You need to call in morning to make appointment as directed with appropriate doctor. Should you have any questions regarding your care or further treatment, please call [facility] Emergency Department at [phone]. Take any medications as prescribed, if given any. Return to ED if symptoms worsen, do not improve, and unable to follow up with your physician, or other concerns arise.

## 2022-07-29 NOTE — TELEPHONE ENCOUNTER
Patient's daughter Karena Donaldson called, states Margaux Ritter seen her Mother in the ICU and per Margaux Ritter a capsule study needs to be completed.

## 2022-07-29 NOTE — ED NOTES
Pt assisted to use bedpan. Put Meplex dressing on existing coccyx wound.      Val Clemons RN  07/29/22 7900

## 2022-07-30 LAB
CULTURE: ABNORMAL
SPECIMEN DESCRIPTION: ABNORMAL

## 2022-08-02 ENCOUNTER — HOSPITAL ENCOUNTER (OUTPATIENT)
Age: 72
Setting detail: SPECIMEN
Discharge: HOME | End: 2022-08-02

## 2022-08-02 LAB
ANION GAP SERPL CALCULATED.3IONS-SCNC: 12 MMOL/L (ref 9–17)
BUN BLDV-MCNC: 17 MG/DL (ref 8–23)
CALCIUM SERPL-MCNC: 8.3 MG/DL (ref 8.6–10.4)
CHLORIDE BLD-SCNC: 100 MMOL/L (ref 98–107)
CO2: 25 MMOL/L (ref 20–31)
CREAT SERPL-MCNC: 1.8 MG/DL (ref 0.5–0.9)
GFR AFRICAN AMERICAN: 34 ML/MIN
GFR NON-AFRICAN AMERICAN: 28 ML/MIN
GFR SERPL CREATININE-BSD FRML MDRD: ABNORMAL ML/MIN/{1.73_M2}
GLUCOSE BLD-MCNC: 109 MG/DL (ref 70–99)
POTASSIUM SERPL-SCNC: 4.8 MMOL/L (ref 3.7–5.3)
PRO-BNP: 5059 PG/ML
SODIUM BLD-SCNC: 137 MMOL/L (ref 135–144)

## 2022-08-02 PROCEDURE — P9603 ONE-WAY ALLOW PRORATED MILES: HCPCS

## 2022-08-02 PROCEDURE — 83880 ASSAY OF NATRIURETIC PEPTIDE: CPT

## 2022-08-02 PROCEDURE — 36415 COLL VENOUS BLD VENIPUNCTURE: CPT

## 2022-08-02 PROCEDURE — 80048 BASIC METABOLIC PNL TOTAL CA: CPT

## 2022-08-02 NOTE — PROGRESS NOTES
Pharmacy Note:    Patient has a urinary tract infection which was empirically treated with ciprofloxacin. Urine culture is growing VRE resistant to ciprofloxacin, but sensitive to tetracyclines. Discussed case with Dr. Marivel Zacarias and recommended doxycyline 100 mg PO BID x7 days He agrees to prescribe. Contacted patient's ECF ([facility]) and discussed test results and treatment plan with SHERITA Hare. Patient's RN expressed understanding and requested prescription be sent to [address] [place] [address] ([phone]). Also faxed results to RN at [phone]. Prescription was received by [facility] Rayo [facility].     Thank you,  Martin Snell, PharmD, BCPS  [phone]

## 2022-08-03 ENCOUNTER — TELEPHONE (OUTPATIENT)
Dept: ONCOLOGY | Age: 72
End: 2022-08-03

## 2022-08-03 ENCOUNTER — OFFICE VISIT (OUTPATIENT)
Dept: ONCOLOGY | Age: 72
End: 2022-08-03
Payer: MEDICARE

## 2022-08-03 VITALS
HEART RATE: 99 BPM | TEMPERATURE: 99.2 F | WEIGHT: 130.5 LBS | DIASTOLIC BLOOD PRESSURE: 66 MMHG | SYSTOLIC BLOOD PRESSURE: 138 MMHG | BODY MASS INDEX: 26.36 KG/M2

## 2022-08-03 DIAGNOSIS — R54 FRAIL ELDERLY: ICD-10-CM

## 2022-08-03 DIAGNOSIS — N18.31 STAGE 3A CHRONIC KIDNEY DISEASE (HCC): ICD-10-CM

## 2022-08-03 DIAGNOSIS — D50.9 IRON DEFICIENCY ANEMIA, UNSPECIFIED IRON DEFICIENCY ANEMIA TYPE: Primary | ICD-10-CM

## 2022-08-03 PROCEDURE — 1111F DSCHRG MED/CURRENT MED MERGE: CPT | Performed by: INTERNAL MEDICINE

## 2022-08-03 PROCEDURE — G8400 PT W/DXA NO RESULTS DOC: HCPCS | Performed by: INTERNAL MEDICINE

## 2022-08-03 PROCEDURE — 99214 OFFICE O/P EST MOD 30 MIN: CPT | Performed by: INTERNAL MEDICINE

## 2022-08-03 PROCEDURE — 1036F TOBACCO NON-USER: CPT | Performed by: INTERNAL MEDICINE

## 2022-08-03 PROCEDURE — 3017F COLORECTAL CA SCREEN DOC REV: CPT | Performed by: INTERNAL MEDICINE

## 2022-08-03 PROCEDURE — G8417 CALC BMI ABV UP PARAM F/U: HCPCS | Performed by: INTERNAL MEDICINE

## 2022-08-03 PROCEDURE — G8428 CUR MEDS NOT DOCUMENT: HCPCS | Performed by: INTERNAL MEDICINE

## 2022-08-03 PROCEDURE — 1124F ACP DISCUSS-NO DSCNMKR DOCD: CPT | Performed by: INTERNAL MEDICINE

## 2022-08-03 PROCEDURE — 1090F PRES/ABSN URINE INCON ASSESS: CPT | Performed by: INTERNAL MEDICINE

## 2022-08-03 NOTE — TELEPHONE ENCOUNTER
AVS from 8/3/22    [facility] test and Labs at pt convieniece      rv 2-3 weeks after blood draw       Rv will be scheduled once test is done writer will follow    Pt was given AVS and appointment schedule    Electronically signed by Peter Reid on 8/3/2022 at 3:59 PM

## 2022-08-03 NOTE — PROGRESS NOTES
DIAGNOSIS:   Iron deficiency anemia  End-stage renal disease  CHF  Elderly patient    CURRENT THERAPY:  Plan for work up    BRIEF CASE HISTORY:   Davion Andres is a very pleasant 66 y.o. female who is referred to us for anemia. She had lab done 03/2022 which showed severe iron deficiency anemia with HGB of 4.4 and was admitted, she received 2 units of blood, colonoscopy and EGD were done and negative, capsule endoscopy has been recommended but she has not consented. During has that stay she also had paracentesis. She was discharged home but remains weak and needs wheelchair. She denies any hematuria, hematochezia, hematemesis, and melena. Her HGB has since normalized, she is not taking any oral iron. She has presented to the ER several times during 05/2022 for CHF, CKD. She has LE edema with ulcers and current cellulitis infection, she has been started on antibiotics. INTERIM HISTORY: The patient presents today for follow up with recurrent anemia. She has had frequent hospitalizations in the interim with anemia and is currently in rehab, she is undergoing physical therapy. She has started undergoing dialysis. She reports bleeding has been found in stool but has refused further work up. PAST MEDICAL HISTORY: has a past medical history of Acute CVA (cerebrovascular accident) ([address] Acute on chronic combined systolic (congestive) and diastolic (congestive) heart failure (HCC), Arthritis, Asthma, Bradycardia, ESRD (end stage renal disease) ([address] Essential hypertension, Hallucinations, Hard of hearing, Head contusion, Iron deficiency anemia, unspecified, Meningioma ([address] Myocardial infarction ([address] Pure hypercholesterolemia, Type 2 diabetes mellitus with stage 4 chronic kidney disease, without long-term current use of insulin ([address] Unspecified venous (peripheral) insufficiency, and Unspecified vitamin D deficiency.     PAST SURGICAL HISTORY: has a past surgical history that includes Tonsillectomy and adenoidectomy; Coronary artery bypass graft; intubation (3/7/2022); Thoracentesis (3/10/2022); Upper gastrointestinal endoscopy (N/A, 3/15/2022); Colonoscopy (N/A, 3/15/2022); IR NONTUNNELED VASCULAR CATHETER > 5 YEARS (6/28/2022); IR TUNNELED CVC PLACE WO SQ PORT/PUMP > 5 YEARS (7/8/2022); and Upper gastrointestinal endoscopy (N/A, 7/21/2022). CURRENT MEDICATIONS:  has a current medication list which includes the following prescription(s): aspirin ec, tiotropium, quetiapine, furosemide, ferrous sulfate, ascorbic acid, pantoprazole, epoetin rick-epbx, amiodarone, hydrocortisone, polyethylene glycol, levothyroxine, [DISCONTINUED] apixaban, metoprolol tartrate, albuterol sulfate hfa, fluticasone-salmeterol, [DISCONTINUED] amlodipine, blood pressure, vitamin d, compressor/nebulizer, adjust fold cane/york handle, handicap placard, atorvastatin, desloratadine, freestyle lancets, magnesium oxide, miconazole, therapeutic multivitamin-minerals, freestyle lite, blood pressure, and blood glucose monitor kit and supplies. ALLERGIES:  is allergic to latex, aleve [naproxen sodium], pioglitazone, claritin [loratadine], keflex [cephalexin], and lisinopril. FAMILY HISTORY: Negative for any hematological or oncological conditions. SOCIAL HISTORY:  reports that she quit smoking about 22 years ago. She has a 2.50 pack-year smoking history. She has never used smokeless tobacco. She reports that she does not currently use alcohol. She reports that she does not use drugs. REVIEW OF SYSTEMS:   General: No fever or night sweats. Weight is stable. ENT: No double or blurred vision, no tinnitus or hearing problem, no dysphagia or sore throat   Respiratory: No chest pain, no shortness of breath, no cough or hemoptysis. Cardiovascular: Denies chest pain, PND or orthopnea. No L E swelling or palpitations. Gastrointestinal: No nausea or vomiting, abdominal pain, diarrhea or constipation.    Genitourinary: Denies dysuria, hematuria, frequency, urgency or incontinence. Neurological: Denies headaches, decreased LOC, no sensory or motor focal deficits. Musculoskeletal: No arthralgia no back pain or joint swelling. Skin: There are no rashes or bleeding. + several ulcers on her lower legs, edema and cellulitis   Psychiatric: No anxiety, no depression. Endocrine: No diabetes or thyroid disease. Hematologic: No bleeding, no adenopathy. PHYSICAL EXAM: Shows a well appearing 66y.o.-year-old female who is not in pain or distress. Vital Signs: Blood pressure 138/66, pulse 99, temperature 99.2 °F (37.3 °C), weight 130 lb 8 oz (59.2 kg). HEENT: Normocephalic and atraumatic. Pupils are equal, round, reactive to light and accommodation. Extraocular muscles are intact. Neck: Showed no JVD, no carotid bruit. Lungs: Clear to auscultation bilaterally. Heart: Regular without any murmur. Abdomen: Soft, nontender. No hepatosplenomegaly. Extremities: Lower extremities show no edema, clubbing, or cyanosis. Breasts: Examination not done today.  Neuro exam: intact cranial nerves bilaterally no motor or sensory deficit, gait is normal. Lymphatic: no adenopathy appreciated in the supraclavicular, axillary, cervical or inguinal area    REVIEW OF LABORATORY DATA:   Lab Results   Component Value Date    WBC 5.8 07/29/2022    HGB 8.3 (L) 07/29/2022    HCT 25.6 (L) 07/29/2022    MCV 94.6 07/29/2022     07/29/2022       Chemistry        Component Value Date/Time     08/02/2022 0642    K 4.8 08/02/2022 0642     08/02/2022 0642    CO2 25 08/02/2022 0642    BUN 17 08/02/2022 0642    CREATININE 1.80 (H) 08/02/2022 0642        Component Value Date/Time    CALCIUM 8.3 (L) 08/02/2022 0642    ALKPHOS 95 07/29/2022 0111    AST 19 07/29/2022 0111    ALT 9 07/29/2022 0111    BILITOT 0.42 07/29/2022 0111        Lab Results   Component Value Date    IRON 80 07/21/2022    TIBC 181 (L) 07/21/2022    FERRITIN 13 03/01/2022           REVIEW OF RADIOLOGICAL RESULTS:   XR CHEST (SINGLE VIEW FRONTAL)    Result Date: 7/14/2022  EXAMINATION: ONE XRAY VIEW OF THE CHEST 7/14/2022 2:44 pm COMPARISON: Chest x-ray dated 10 July 2022 HISTORY: ORDERING SYSTEM PROVIDED HISTORY: screening TECHNOLOGIST PROVIDED HISTORY: screening FINDINGS: Stable right-sided dialysis catheter. Stable cardiomegaly with bilateral pleural effusions and bibasilar airspace opacities. Stable findings of CHF. NM GI BLOOD LOSS    Result Date: 7/20/2022  EXAMINATION: NUCLEAR MEDICINE GASTRIC BLEEDING STUDY 7/20/2022 TECHNIQUE: Following the intravenous injection of 22.5 mCi of 99mTc-labeled RBCs, a flow study and standard images of the abdomen was obtained over a total period of 60 minutes. COMPARISON: Abdomen and pelvis CT 05/25/2020 HISTORY: ORDERING SYSTEM PROVIDED HISTORY: rectal bleeding TECHNOLOGIST PROVIDED HISTORY: rectal bleeding Reason for Exam: rectal bleeding FINDINGS: Activity within the blood pool, including the great vessels, heart, liver, and spleen. Accumulation of a small amount of activity in the urinary bladder over the course of the study. Abnormal bowel activity beginning by approximately 13 minutes, appearing to extend initially to left and slightly superiorly before extending to the right and inferiorly at the level of the aortic bifurcation. Active gastrointestinal bleeding during study acquisition. The source is not definitely localized with considerations including the 3rd or 4th segment of the duodenum or the mid to distal transverse colon. Consider further evaluation with CT angiography for better localization. RECOMMENDATIONS: If the patient shows hemodynamic signs of an active bleed in the next 20 hours, additional images can be acquired.      XR CHEST PORTABLE    Result Date: 7/20/2022  EXAMINATION: ONE XRAY VIEW OF THE CHEST 7/20/2022 6:03 pm COMPARISON: Chest x-ray dated 20 July 2022, 1626 hours HISTORY: ORDERING SYSTEM PROVIDED HISTORY: central line place TECHNOLOGIST PROVIDED HISTORY: central line place Reason for Exam: Central line placement. Due to pt. condition, pt. unable to raise chin FINDINGS: Right-sided central venous catheter with the tip in the superior vena cava. There is a left-sided IJ catheter with the tip in the superior vena cava. Moderate right and small left pleural effusions. Bibasilar airspace opacities. No pneumothorax. Stable cardiomediastinal silhouette     1. Interval placement of a left-sided IJ catheter with the tip in the superior vena cava. 2.  Moderate right and small left pleural effusions with bibasilar airspace opacities     XR CHEST PORTABLE    Result Date: 7/20/2022  EXAMINATION: ONE XRAY VIEW OF THE CHEST 7/20/2022 4:15 pm COMPARISON: Chest x-ray dated 19 July 2022 HISTORY: ORDERING SYSTEM PROVIDED HISTORY: Follow up, respiratory distress TECHNOLOGIST PROVIDED HISTORY: Follow up, respiratory distress Reason for Exam: Follow up, respiratory distress FINDINGS: right-sided central venous catheter with the tip in the SVC. Moderate right pleural effusion with right basilar airspace opacities appear stable. No pneumothorax. Stable cardiomegaly     Stable exam with moderate right-sided pleural effusion with right basilar airspace disease. XR CHEST PORTABLE    Result Date: 7/20/2022  EXAMINATION: ONE XRAY VIEW OF THE CHEST 7/19/2022 11:57 pm COMPARISON: Prior studies including 06/30/2022 and 07/14/2022 HISTORY: ORDERING SYSTEM PROVIDED HISTORY: pain TECHNOLOGIST PROVIDED HISTORY: pain Reason for Exam: CP and melena X several days FINDINGS: Pulmonary edema has improved from 07/14/2022. There is a persistent moderate right pleural effusion with right basilar consolidation. The heart is enlarged and unchanged. Prior sternal splitting procedure. A right IJ tunneled dialysis catheter remains in good position. Pulmonary edema has improved. Persistent moderate-sized right pleural effusion with basilar consolidation. XR CHEST PORTABLE    Result Date: 7/11/2022  EXAMINATION: ONE X-RAY VIEW OF THE CHEST 7/10/2022 2:21 pm COMPARISON: 07/03/2022 HISTORY: ORDERING SYSTEM PROVIDED HISTORY:  F/U CHF TECHNOLOGIST PROVIDED HISTORY: F/U CHF FINDINGS: Right-sided central venous catheter tip overlies the superior cavoatrial junction. The heart is similar in size to the prior study. There are bilateral pleural effusions and interstitial/airspace opacities. No pneumothorax is seen. Findings are compatible with pulmonary edema. Bilateral pleural effusions. IR TUNNELED CVC PLACE WO SQ PORT/PUMP > 5 YEARS    Result Date: 7/8/2022  PROCEDURE: FLUOROSCOPY GUIDED CONVERSION OF A TEMPORARY DIALYSIS CATHETER TO TUNNELED DIALYSIS CATHETER. 7/8/2022. HISTORY: ORDERING SYSTEM PROVIDED HISTORY: hemodialysis access TECHNOLOGIST PROVIDED HISTORY: hemodialysis access How many lumens are being requested?->2 What side should this line be placed? ->Either What site is the preferred site? ->Internal Jugular ACUTE KIDNEY INJURY SEDATION: 50 mcg fentanyl were titrated intravenously for pain control monitored under my direction. Total intraservice time of sedation was 15 minutes. The patient's vital signs were monitored throughout the procedure and recorded in the patient's medical record by the nurse. FLUOROSCOPY DOSE AND TYPE OR TIME AND EXPOSURES: Fluoro time 0.2 minutes DAP 51 cGy cm 2 TECHNIQUE: This procedure was performed by Gigi Gomez PA-C under direct supervision of Dr. Edward Lemus. Informed consent was obtained after a detailed explanation of the procedure including risks, benefits, and alternatives. Universal protocol was observed. The right neck and chest were prepped and draped in standard sterile fashion using maximum sterile barrier technique. A 035 guidewire was inserted through the right internal jugular vein temporary dialysis catheter and under fluoroscopic guidance was removed over the wire.   The tract was serially dilated to allow the peel away vascular sheath. After localizing the right upper chest with 1% lidocaine a 2nd incision was made. A 14.5 Bengali by 19 cm tip to cuff dual-lumen tunneled hemodialysis catheter was inserted through the tract and out of the venotomy site of the previous temporary dialysis catheter. The dialysis catheter was inserted through the sheath with tip terminating in the right atrium. Both ports flushed and aspirated appropriately and filled with heparinized saline. The catheter was sutured to the skin and dressed appropriately. Estimated blood loss was 5 mL. The patient tolerated the procedure well and left the department stable condition. FINDINGS: Fluoroscopic image demonstrates the tip of the catheter in the right atrium. Successful ultrasound and fluoroscopy guided tunneled catheter placement . Okay to use. CTA ABDOMEN PELVIS W CONTRAST    Result Date: 7/22/2022  EXAMINATION: CTA OF THE ABDOMEN AND PELVIS WITH CONTRAST 7/22/2022 9:30 am: TECHNIQUE: CTA of the abdomen and pelvis was performed without and with the administration of 100 mL Isovue 370 intravenous contrast. Multiplanar reformatted images are provided for review. MIP images are provided for review. 3D reconstructions were performed on independent workstation. This automated exposure control, iterative reconstruction, and/or weight based adjustment of the mA/kV was utilized to reduce the radiation dose to as low as reasonably achievable. COMPARISON: GI bleeding scan from 2 days ago, CT on 05/25/2021 HISTORY: Acute GI bleeding. FINDINGS: Post CABG changes. Mild cardiomegaly. Moderate right pleural effusion with adjacent compressive atelectasis. Mild-moderate left pleural effusion with adjacent compressive atelectasis. CTA ABDOMEN: No hyperdensity is seen within the gastrointestinal tract on noncontrast images or after contrast administration. No acute inflammation of the gastrointestinal tract. No bowel obstruction.  Fatty atrophy of the pancreas. Stable 1.5 cm stone in the mid to lower right kidney. No hydronephrosis. Stable benign right adrenal nodule which could represent an adenoma or myelolipoma; no follow-up imaging recommended. Remaining solid organs and the gallbladder are unremarkable. No ascites or significant lymphadenopathy. Severe atherosclerotic disease of the abdominal aorta without aneurysm. Major mesenteric arteries are patent. Moderate stenosis of the proximal celiac artery and mild stenosis of the proximal SMA. Moderate stenosis of the proximal ALLY. No significant renal artery stenosis. CTA PELVIS: Iliac arterial system is patent bilaterally with mild-moderate stenosis involving the common iliac arteries. Bladder and uterus are unremarkable. No lymphadenopathy or free fluid. Mild anasarca. Visualized osseous structures are unremarkable. 1. No evidence of active GI bleeding. 2. Severe atherosclerotic disease with arterial stenosis as described above. 3. Stable right nephrolithiasis. 4. Mild cardiomegaly with right greater than left pleural effusions and adjacent atelectasis. FL MODIFIED BARIUM SWALLOW W VIDEO    Result Date: 7/10/2022  EXAMINATION: MODIFIED BARIUM SWALLOW WAS PERFORMED IN CONJUNCTION WITH SPEECH PATHOLOGY SERVICES TECHNIQUE: Fluoroscopic evaluation of the swallowing mechanism was performed using cineradiography with multiple consistency of barium product in conjunction with speech pathology services. FLUOROSCOPY DOSE AND TYPE OR TIME AND EXPOSURES: Fluoro time: 2 minutes Dose: 13.644 mGy COMPARISON: None HISTORY: ORDERING SYSTEM PROVIDED HISTORY: concern for aspiration TECHNOLOGIST PROVIDED HISTORY: concern for aspiration 65-year-old female with possible aspiration FINDINGS: With the thin liquid substance by straw, there was trace flash penetration without aspiration.  With the thick liquid substance by teaspoon and straw, pureed/pudding thick, soft solid and cookie solid substances, there was no penetration or aspiration. 1. Trace flash penetration without aspiration with the thin liquid substance by straw. 2. No penetration or aspiration with the remainder of the administered substances. Please see separate speech pathology report for full discussion of findings and recommendations. IMPRESSION:   Iron deficiency anemia  GI bleed  End-stage renal disease  CHF  Elderly patient    PLAN:   I reviewed notes and results of frequent hospitalizations in the interim with persistent anemia requiring transfusions, she has had positive occult stool test.  Repeat EGD did not show any obvious source of bleeding. However nuclear scan was positive suggesting GI bleed. CTA did not show any active bleed. Patient also has been started on dialysis. Patient is receiving oral iron. Recommend optimizing SHIRA therapy with hemodialysis to keep hemoglobin in the range of 10-11 for end-stage renal disease  Primary bone marrow disorder is also differential.  We will obtain NGS testing on the peripheral blood. Patient is refusing bone marrow biopsy  The patient is not interested in any further work up including lab work at this time and may consider in the future, I am putting in order for lab work to be done when she is ready. Return in 2-3 weeks after lab work. Scribe Attestation   This note was created by Lilian Phan acting as scribe for the physician signing this note  Electronically Signed  Svitlana Abdi, 8/3/2022  Scribe, [employer]. Attending Attestation   Note was reviewed and edited. I am in agreement with the note as entered    Lani Bucio MD        This note is created with the assistance of a speech recognition program.  While intending to generate a document that actually reflects the content of the visit, the document can still have some errors including those of syntax and sound a like substitutions which may escape proof reading.   It such instances, actual meaning can be extrapolated by contextual diversion. Radha Mathew

## 2022-08-03 NOTE — TELEPHONE ENCOUNTER
Patient's daughter LVM on the  phone, states she has been calling for days attempting to schedule her mother for a capsule study and has not rec'd a return phone call back to schedule. Per pt's daughter, she states she was already transferred back to clinical and the phone ring and ring without a . Patient's daughter would please like a return call to schedule her Mother.

## 2022-08-08 NOTE — TELEPHONE ENCOUNTER
Writer called to schedule, daughter stated she is in a rehab facility. Writer informed her she was okay to schedule whenever she is ready. Pts daughter will call back to schedule.

## 2022-08-12 ENCOUNTER — TELEPHONE (OUTPATIENT)
Dept: FAMILY MEDICINE CLINIC | Age: 72
End: 2022-08-12

## 2022-08-12 NOTE — TELEPHONE ENCOUNTER
Patient is at [address] [facility] [address] now, we cancelled the visit for today  In the middle of the video visit, Karena Donaldson says she is in nursing home now  Doing therapy now  Daughter says depression and anxiety, and if she is taking anything for it  Patient says \"I keep doing different things\"  She says she is on \"Restricted liquid diet\"  \"They want to do another swallowing the sleep\"  She says she has been \"Back and forth, why is active bleeding sometimes and it is not other times\"  She says \"I want to be normal\"  Has Compression socks    Karena Donaldson says \"They removed the unna boots, now duble the size and sipping\"    She is at [address] [facility] [address] now    I apologized to the daughter and I explained that we will need to cancel the visit, I did speak with her to request the doctor at [address] [place] [address] to come and evaluate the patient to request possible Zoloft, for depression, to request Occupational Therapy to put her Unna boots back, wound care if needed, ENT referral if she needs, swallowing test if she needs.    They will contact us when discharged  She understood  I confirmed with our staff that we cannot bill for a visit while she is in SNF

## 2022-08-19 NOTE — PLAN OF CARE
Problem: Falls - Risk of:  Goal: Will remain free from falls  Description: Will remain free from falls  2/8/2022 1746 by Vin Anton RN  Outcome: Ongoing     Problem: Falls - Risk of:  Goal: Absence of physical injury  Description: Absence of physical injury  2/8/2022 1746 by Vin Anton RN  Outcome: Ongoing     Problem: Cardiac:  Goal: Ability to maintain vital signs within normal range will improve  Description: Ability to maintain vital signs within normal range will improve  2/8/2022 1746 by Vin Anton RN  Outcome: Ongoing     Problem: Cardiac:  Goal: Cardiovascular alteration will improve  Description: Cardiovascular alteration will improve  2/8/2022 1746 by Vin Anton RN  Outcome: Ongoing     Problem: Physical Regulation:  Goal: Complications related to the disease process, condition or treatment will be avoided or minimized  Description: Complications related to the disease process, condition or treatment will be avoided or minimized  2/8/2022 1746 by Vin Anton RN  Outcome: Ongoing I have reviewed and confirmed nurses' notes for patient's medications, allergies, medical history, and surgical history.

## 2022-08-21 PROBLEM — N39.0 UTI (URINARY TRACT INFECTION): Status: RESOLVED | Noted: 2021-02-18 | Resolved: 2022-08-21

## 2022-09-19 ENCOUNTER — HOSPITAL ENCOUNTER (OUTPATIENT)
Age: 72
Setting detail: SPECIMEN
Discharge: HOME | End: 2022-09-19

## 2022-09-19 LAB
ALBUMIN SERPL-MCNC: 3.3 G/DL (ref 3.5–5.2)
ALBUMIN/GLOBULIN RATIO: 0.9 (ref 1–2.5)
ALP BLD-CCNC: 109 U/L (ref 35–104)
ALT SERPL-CCNC: 9 U/L (ref 5–33)
ANION GAP SERPL CALCULATED.3IONS-SCNC: 19 MMOL/L (ref 9–17)
AST SERPL-CCNC: 15 U/L
BILIRUB SERPL-MCNC: 0.2 MG/DL (ref 0.3–1.2)
BUN BLDV-MCNC: 77 MG/DL (ref 8–23)
CALCIUM SERPL-MCNC: 8.8 MG/DL (ref 8.6–10.4)
CHLORIDE BLD-SCNC: 97 MMOL/L (ref 98–107)
CHOLESTEROL/HDL RATIO: 2.5
CHOLESTEROL: 116 MG/DL
CO2: 18 MMOL/L (ref 20–31)
CREAT SERPL-MCNC: 3.87 MG/DL (ref 0.5–0.9)
GFR AFRICAN AMERICAN: 14 ML/MIN
GFR NON-AFRICAN AMERICAN: 11 ML/MIN
GFR SERPL CREATININE-BSD FRML MDRD: ABNORMAL ML/MIN/{1.73_M2}
GLUCOSE BLD-MCNC: 151 MG/DL (ref 70–99)
HCT VFR BLD CALC: 35.7 % (ref 36.3–47.1)
HDLC SERPL-MCNC: 46 MG/DL
HEMOGLOBIN: 11.4 G/DL (ref 11.9–15.1)
LDL CHOLESTEROL: 41 MG/DL (ref 0–130)
MCH RBC QN AUTO: 30.6 PG (ref 25.2–33.5)
MCHC RBC AUTO-ENTMCNC: 31.9 G/DL (ref 28.4–34.8)
MCV RBC AUTO: 96 FL (ref 82.6–102.9)
NRBC AUTOMATED: 0 PER 100 WBC
PDW BLD-RTO: 14.8 % (ref 11.8–14.4)
PLATELET # BLD: 249 K/UL (ref 138–453)
PMV BLD AUTO: 9.7 FL (ref 8.1–13.5)
POTASSIUM SERPL-SCNC: 4.2 MMOL/L (ref 3.7–5.3)
RBC # BLD: 3.72 M/UL (ref 3.95–5.11)
SODIUM BLD-SCNC: 134 MMOL/L (ref 135–144)
TOTAL PROTEIN: 7.1 G/DL (ref 6.4–8.3)
TRIGL SERPL-MCNC: 144 MG/DL
TSH SERPL DL<=0.05 MIU/L-ACNC: 0.86 UIU/ML (ref 0.3–5)
VITAMIN D 25-HYDROXY: 51.6 NG/ML
WBC # BLD: 8.2 K/UL (ref 3.5–11.3)

## 2022-09-19 PROCEDURE — 82306 VITAMIN D 25 HYDROXY: CPT

## 2022-09-19 PROCEDURE — P9603 ONE-WAY ALLOW PRORATED MILES: HCPCS

## 2022-09-19 PROCEDURE — 84443 ASSAY THYROID STIM HORMONE: CPT

## 2022-09-19 PROCEDURE — 85027 COMPLETE CBC AUTOMATED: CPT

## 2022-09-19 PROCEDURE — 80053 COMPREHEN METABOLIC PANEL: CPT

## 2022-09-19 PROCEDURE — 80061 LIPID PANEL: CPT

## 2022-09-19 PROCEDURE — 36415 COLL VENOUS BLD VENIPUNCTURE: CPT

## 2022-09-21 ENCOUNTER — OFFICE VISIT (OUTPATIENT)
Dept: UROLOGY | Age: 72
End: 2022-09-21
Payer: MEDICARE

## 2022-09-21 VITALS — HEIGHT: 59 IN | BODY MASS INDEX: 26.36 KG/M2

## 2022-09-21 DIAGNOSIS — N20.0 KIDNEY STONE: Primary | ICD-10-CM

## 2022-09-21 DIAGNOSIS — N39.0 RECURRENT UTI: ICD-10-CM

## 2022-09-21 PROCEDURE — 3017F COLORECTAL CA SCREEN DOC REV: CPT | Performed by: NURSE PRACTITIONER

## 2022-09-21 PROCEDURE — G8427 DOCREV CUR MEDS BY ELIG CLIN: HCPCS | Performed by: NURSE PRACTITIONER

## 2022-09-21 PROCEDURE — 1036F TOBACCO NON-USER: CPT | Performed by: NURSE PRACTITIONER

## 2022-09-21 PROCEDURE — 1090F PRES/ABSN URINE INCON ASSESS: CPT | Performed by: NURSE PRACTITIONER

## 2022-09-21 PROCEDURE — G8417 CALC BMI ABV UP PARAM F/U: HCPCS | Performed by: NURSE PRACTITIONER

## 2022-09-21 PROCEDURE — G8400 PT W/DXA NO RESULTS DOC: HCPCS | Performed by: NURSE PRACTITIONER

## 2022-09-21 PROCEDURE — 1124F ACP DISCUSS-NO DSCNMKR DOCD: CPT | Performed by: NURSE PRACTITIONER

## 2022-09-21 PROCEDURE — 99213 OFFICE O/P EST LOW 20 MIN: CPT | Performed by: NURSE PRACTITIONER

## 2022-09-21 ASSESSMENT — ENCOUNTER SYMPTOMS
WHEEZING: 0
RESPIRATORY NEGATIVE: 1
CONSTIPATION: 0
VOMITING: 0
EYES NEGATIVE: 1
BACK PAIN: 0
EYE REDNESS: 0
GASTROINTESTINAL NEGATIVE: 1
DIARRHEA: 0
EYE PAIN: 0
ABDOMINAL PAIN: 0
NAUSEA: 0
COUGH: 0
SHORTNESS OF BREATH: 0

## 2022-09-21 NOTE — PROGRESS NOTES
[address] [address]  Dept: 92 Keshawn Pearson [facility] Urology Office Note - Established    Patient:  Dylan Taylor  YOB: 1950  Date: 9/21/2022    The patient is a 67 y.o. female whopresents today for evaluation of the following problems:   Chief Complaint   Patient presents with    1 Year Follow Up       HPI  Patient is presenting for yearly follow-up. She is known to Dr. Dilip Henderson. She has a hx of gross hematuria, she was found to have a UTI and large non-obstructing stone on CT. Last year, she declined intervention for the stone and also declined cysto as well. She has not seen blood over the last year. She reports two infections over the last year. She is voiding well otherwise, denies hematuria, dysuria, flank pain, fever or chills. No other concerns for today's visit. Summary of old records: N/A    Additional History: N/A    Procedures Today: N/A    Urinalysis today:  No results found for this visit on 09/21/22. Imaging Reviewed during this Office Visit:   Narrative   EXAMINATION:   CTA OF THE ABDOMEN AND PELVIS WITH CONTRAST       7/22/2022 9:30 am:       TECHNIQUE:   CTA of the abdomen and pelvis was performed without and with the   administration of 100 mL Isovue 370 intravenous contrast. Multiplanar   reformatted images are provided for review. MIP images are provided for   review. 3D reconstructions were performed on independent workstation. This   automated exposure control, iterative reconstruction, and/or weight based   adjustment of the mA/kV was utilized to reduce the radiation dose to as low   as reasonably achievable. COMPARISON:   GI bleeding scan from 2 days ago, CT on 05/25/2021       HISTORY:   Acute GI bleeding. FINDINGS:   Post CABG changes. Mild cardiomegaly. Moderate right pleural effusion with   adjacent compressive atelectasis. Mild-moderate left pleural effusion with   adjacent compressive atelectasis. CTA ABDOMEN:       No hyperdensity is seen within the gastrointestinal tract on noncontrast   images or after contrast administration. No acute inflammation of the   gastrointestinal tract. No bowel obstruction. Fatty atrophy of the pancreas. Stable 1.5 cm stone in the mid to lower right   kidney. No hydronephrosis. Stable benign right adrenal nodule which could   represent an adenoma or myelolipoma; no follow-up imaging recommended. Remaining solid organs and the gallbladder are unremarkable. No ascites or   significant lymphadenopathy. Severe atherosclerotic disease of the abdominal   aorta without aneurysm. Major mesenteric arteries are patent. Moderate   stenosis of the proximal celiac artery and mild stenosis of the proximal SMA. Moderate stenosis of the proximal ALLY. No significant renal artery stenosis. CTA PELVIS:       Iliac arterial system is patent bilaterally with mild-moderate stenosis   involving the common iliac arteries. Bladder and uterus are unremarkable. No lymphadenopathy or free fluid. Mild   anasarca. Visualized osseous structures are unremarkable. Impression   1. No evidence of active GI bleeding. 2. Severe atherosclerotic disease with arterial stenosis as described above. 3. Stable right nephrolithiasis. 4. Mild cardiomegaly with right greater than left pleural effusions and   adjacent atelectasis.          (results were independently reviewed by physician and radiology report verified)      Last BUN and creatinine:  Lab Results   Component Value Date    BUN 77 (H) 09/19/2022     Lab Results   Component Value Date    CREATININE 3.87 (H) 09/19/2022       Additional Lab/Culture results:    Contains abnormal data Culture, Urine  Order: [id number]  Status: Final result    Visible to patient: Yes (not seen)    Next appt: 09/28/2022 at 03:45 PM in Oncology Nhi Portillo MD)    Specimen Information: Urine, clean catch        1 Result Note  Component 7/29/22 0326    Specimen Description . CLEAN CATCH URINE    Culture VANCOMYCIN RESISTANT ENTEROCOCCUS FAECIUM 10 to 50,000 CFU/ML Abnormal     Resulting Agency [address]        Susceptibility    Vancomycin Resistant Enterococcus faecium (1)    Antibiotic Interpretation Microscan Method Status    ampicillin Resistant >=32 BACTERIAL SUSCEPTIBILITY PANEL SHELDON Final    ciprofloxacin Resistant >=8 BACTERIAL SUSCEPTIBILITY PANEL SHELDON Final    levofloxacin Resistant >=8 BACTERIAL SUSCEPTIBILITY PANEL SHELDON Final    linezolid Sensitive 2 BACTERIAL SUSCEPTIBILITY PANEL SHELDON Final    nitrofurantoin Resistant 128 BACTERIAL SUSCEPTIBILITY PANEL SHELDON Final    tetracycline Sensitive <=1 BACTERIAL SUSCEPTIBILITY PANEL SHELDON Final    vancomycin Resistant >=32 BACTERIAL SUSCEPTIBILITY PANEL SHELDON Final          Specimen Collected: 07/29/22 03:26 EDT Last Resulted: 07/30/22 22:20 EDT             PAST MEDICAL, FAMILY AND SOCIAL HISTORY UPDATE:  Past Medical History:   Diagnosis Date    Acute CVA (cerebrovascular accident) ([address] 07/25/2020    Acute on chronic combined systolic (congestive) and diastolic (congestive) heart failure ([address] 02/06/2022    Arthritis     Asthma     Bradycardia 06/12/2022    ESRD (end stage renal disease) ([address]     Essential hypertension 07/25/2020    Hallucinations 02/18/2021    Hard of hearing     Head contusion 09/09/2020    Iron deficiency anemia, unspecified     Meningioma ([address] 02/25/2021    Myocardial infarction ([address]     Pure hypercholesterolemia     Type 2 diabetes mellitus with stage 4 chronic kidney disease, without long-term current use of insulin (HCC)     Unspecified venous (peripheral) insufficiency     Unspecified vitamin D deficiency      Past Surgical History:   Procedure Laterality Date    COLONOSCOPY N/A 3/15/2022    COLONOSCOPY DIAGNOSTIC performed by Marta Disla MD at [facility] ENDO CORONARY ARTERY BYPASS GRAFT      x 3, Dr. Amita Perez  3/7/2022         IR NONTUNNELED VASCULAR CATHETER  6/28/2022    IR NONTUNNELED VASCULAR CATHETER 6/28/2022 [employer] [facility]    IR TUNNELED CATHETER PLACEMENT GREATER THAN 5 YEARS  7/8/2022    IR TUNNELED CATHETER PLACEMENT GREATER THAN 5 YEARS 7/8/2022 [facility]    THORACENTESIS  3/10/2022         TONSILLECTOMY AND ADENOIDECTOMY      UPPER GASTROINTESTINAL ENDOSCOPY N/A 3/15/2022    EGD BIOPSY performed by Tanya Chávez MD at [facility] N/A 7/21/2022    EGD ESOPHAGOGASTRODUODENOSCOPY performed by Eve Randhawa MD at [facility]     Family History   Problem Relation Age of Onset    Diabetes Mother     Heart Disease Mother     Heart Disease Father     Diabetes Sister     High Blood Pressure Sister     Diabetes Maternal Grandmother      Outpatient Medications Marked as Taking for the 9/21/22 encounter (Office Visit) with MAL Gaines CNP   Medication Sig Dispense Refill    tiotropium (SPIRIVA RESPIMAT) 2.5 MCG/ACT AERS inhaler Inhale 2 puffs into the lungs every morning (before breakfast) 2 each 1    QUEtiapine (SEROQUEL) 25 MG tablet Take 1 tablet by mouth nightly 30 tablet 0    furosemide (LASIX) 80 MG tablet Take 1 tablet by mouth in the morning. 60 tablet 3    ferrous sulfate (IRON 325) 325 (65 Fe) MG tablet Take 1 tablet by mouth in the morning and at bedtime 60 tablet 0    ascorbic acid (VITAMIN C) 500 MG tablet Take 1 tablet by mouth in the morning and 1 tablet before bedtime.  30 tablet 3    pantoprazole (PROTONIX) 40 MG tablet Take 1 tablet by mouth every morning (before breakfast) 30 tablet 3    epoetin rick-epbx (RETACRIT) 62571 UNIT/ML SOLN injection Inject 0.5 mLs into the skin three times a week 6.6 mL     amiodarone (PACERONE) 100 MG tablet Take 1 tablet by mouth daily 30 tablet 0    hydrocortisone (ANUSOL-HC) 25 MG suppository Place 1 suppository rectally 2 times daily      levothyroxine (SYNTHROID) 25 MCG tablet Take 1 tablet by mouth Daily 30 tablet 3    metoprolol tartrate (LOPRESSOR) 25 MG tablet Take 1 tablet by mouth 2 times daily 60 tablet 3    albuterol sulfate HFA (PROAIR HFA) 108 (90 Base) MCG/ACT inhaler Inhale 2 puffs into the lungs every 6 hours as needed for Wheezing or Shortness of Breath (cough) 18 g 3    fluticasone-salmeterol (ADVAIR HFA) 115-21 MCG/ACT inhaler Inhale 2 puffs into the lungs 2 times daily Maintenance inhaler 36 g 3    Blood Pressure KIT Diagnosis: HTN. Needs to check blood pressure 1-2 times a day until stable, then once a day. Goal blood pressure less than 135/85, and above 110/60. 1 kit 0    vitamin D (ERGOCALCIFEROL) 1.25 MG (87873 UT) CAPS capsule Take 1 capsule by mouth once a week Sundays 12 capsule 0    Nebulizers (COMPRESSOR/NEBULIZER) MISC Nebulizer with compressor. Disposable Med Nebs 2 /month. Reusable Med Nebs 1 per 6 months. Aerosol Mask 1 per month. Replacement Filters 2 per month. All other related supplies as needed per month. Medications being used: Albuterol . 083 unit dose vial. Frequency: every 6 hrs x 4/day . Length of Need: 1 1 each 3    Misc.  Devices (ADJUST FOLD CANE/YORK HANDLE) MISC Use daily for walking 1 each 0    Handicap Placard MISC by Does not apply route Expires on 12/30/25 1 each 0    atorvastatin (LIPITOR) 40 MG tablet Take 1 tablet by mouth once daily 90 tablet 3    desloratadine (CLARINEX) 5 MG tablet Take 1 tablet by mouth once daily 90 tablet 3    FreeStyle Lancets MISC 1 each by Does not apply route daily Patient needs to contact office before any further refills will be approved 90 each 3    magnesium oxide (MAG-OX) 400 (241.3 Mg) MG TABS tablet Take 1 tablet by mouth twice daily 180 tablet 3    miconazole (MICOTIN) 2 % powder Apply topically 2 times daily UNDER THE SKIN FOLDS LONG TERM 45 g 1    Multiple Vitamins-Minerals (THERAPEUTIC MULTIVITAMIN-MINERALS) tablet Take 1 tablet by mouth daily 90 tablet 3    blood glucose test strips (FREESTYLE LITE) strip 1 each by In Vitro route daily As needed. 100 each 3    Blood Pressure KIT 1 kit by Does not apply route three times daily 1 kit 0    blood glucose monitor kit and supplies 1 kit by Other route three times daily Dispense Butterfly Elite CBG Device 1 kit 0      (All medications reviewed and updated by provider sincelast office visit or hospitalization)   Latex, Aleve [naproxen sodium], Pioglitazone, Claritin [loratadine], Keflex [cephalexin], and Lisinopril  Social History     Tobacco Use   Smoking Status Former    Packs/day: 0.25    Years: 10.00    Pack years: 2.50    Types: Cigarettes    Quit date: 2000    Years since quittin.7   Smokeless Tobacco Never      (If patient a smoker, smoking cessation counseling offered)     Social History     Substance and Sexual Activity   Alcohol Use Not Currently    Alcohol/week: 0.0 standard drinks       REVIEW OF SYSTEMS:  Review of Systems      Physical Exam:    There were no vitals filed for this visit. Body mass index is 26.36 kg/m². Patient is a 67 y.o. female in noacute distress and alert and oriented to person, place and time. Physical Exam  Constitutional: Patient in no acute distress. Neuro: Alert andoriented to person, place and time. Psych: Mood normal, affect normal  Skin: No rash noted  Lungs: Respiratory effort is normal  Cardiovascular: Warm & Pink  Abdomen: Soft, non-tender, non-distended with no CVA,  No flank tenderness  Bladder non-tender and not distended. Musculoskeletal: wheelchair       and Plan      1. Kidney stone    2. Recurrent UTI           Plan:   We discussed her hx of hematuria, UTIs, and kidney stones. She is not interested in any additional testing or treatment at this time. States she is undergoing other tests and dialysis. She declines stone treatment or further monitoring at this time. She declines D-mannose for UTI prevention.    She declines cysto for hx of hematuria and UTIs. We reviewed conservative measures for UTI prevention. Call with any new/worsening urinary symptoms, questions or concerns. Return in about 1 year (around 9/21/2023), or if symptoms worsen or fail to improve. Prescriptions Ordered:  No orders of the defined types were placed in this encounter. Orders Placed:  No orders of the defined types were placed in this encounter. MAL Dolan CNP    Reviewed and agree with the ROS entered by the MA.

## 2022-09-28 ENCOUNTER — HOSPITAL ENCOUNTER (OUTPATIENT)
Age: 72
Discharge: HOME | End: 2022-09-28
Payer: MEDICARE

## 2022-09-28 ENCOUNTER — TELEPHONE (OUTPATIENT)
Dept: ONCOLOGY | Age: 72
End: 2022-09-28

## 2022-09-28 ENCOUNTER — OFFICE VISIT (OUTPATIENT)
Dept: ONCOLOGY | Age: 72
End: 2022-09-28
Payer: MEDICARE

## 2022-09-28 ENCOUNTER — TELEPHONE (OUTPATIENT)
Dept: INFUSION THERAPY | Age: 72
End: 2022-09-28

## 2022-09-28 VITALS
DIASTOLIC BLOOD PRESSURE: 75 MMHG | HEART RATE: 111 BPM | SYSTOLIC BLOOD PRESSURE: 146 MMHG | WEIGHT: 135 LBS | BODY MASS INDEX: 27.27 KG/M2 | TEMPERATURE: 98.2 F

## 2022-09-28 DIAGNOSIS — R54 FRAIL ELDERLY: ICD-10-CM

## 2022-09-28 DIAGNOSIS — D50.9 IRON DEFICIENCY ANEMIA, UNSPECIFIED IRON DEFICIENCY ANEMIA TYPE: Primary | ICD-10-CM

## 2022-09-28 DIAGNOSIS — D50.9 IRON DEFICIENCY ANEMIA, UNSPECIFIED IRON DEFICIENCY ANEMIA TYPE: ICD-10-CM

## 2022-09-28 DIAGNOSIS — N18.31 STAGE 3A CHRONIC KIDNEY DISEASE (HCC): ICD-10-CM

## 2022-09-28 PROCEDURE — 99214 OFFICE O/P EST MOD 30 MIN: CPT | Performed by: INTERNAL MEDICINE

## 2022-09-28 PROCEDURE — 99211 OFF/OP EST MAY X REQ PHY/QHP: CPT | Performed by: INTERNAL MEDICINE

## 2022-09-28 PROCEDURE — G8400 PT W/DXA NO RESULTS DOC: HCPCS | Performed by: INTERNAL MEDICINE

## 2022-09-28 PROCEDURE — 1124F ACP DISCUSS-NO DSCNMKR DOCD: CPT | Performed by: INTERNAL MEDICINE

## 2022-09-28 PROCEDURE — 3017F COLORECTAL CA SCREEN DOC REV: CPT | Performed by: INTERNAL MEDICINE

## 2022-09-28 PROCEDURE — 1090F PRES/ABSN URINE INCON ASSESS: CPT | Performed by: INTERNAL MEDICINE

## 2022-09-28 PROCEDURE — G8417 CALC BMI ABV UP PARAM F/U: HCPCS | Performed by: INTERNAL MEDICINE

## 2022-09-28 PROCEDURE — 36415 COLL VENOUS BLD VENIPUNCTURE: CPT

## 2022-09-28 PROCEDURE — G8427 DOCREV CUR MEDS BY ELIG CLIN: HCPCS | Performed by: INTERNAL MEDICINE

## 2022-09-28 PROCEDURE — 1036F TOBACCO NON-USER: CPT | Performed by: INTERNAL MEDICINE

## 2022-09-28 NOTE — TELEPHONE ENCOUNTER
Addended by: LAVERNE BUCKLEY on: 6/10/2022 12:47 PM     Modules accepted: Orders     F/u vv appt Nov 4 @4  Patient daughter instructed to have labs drawn 1 week prior (Rey Finley)

## 2022-09-28 NOTE — PROGRESS NOTES
DIAGNOSIS:   Iron deficiency anemia  End-stage renal disease  CHF  Elderly patient    CURRENT THERAPY:  [facility] testing/NGS    BRIEF CASE HISTORY:   Dottie Wilde is a very pleasant 67 y.o. female who is referred to us for anemia. She had lab done 03/2022 which showed severe iron deficiency anemia with HGB of 4.4 and was admitted, she received 2 units of blood, colonoscopy and EGD were done and negative, capsule endoscopy has been recommended but she has not consented. During has that stay she also had paracentesis. She was discharged home but remains weak and needs wheelchair. She denies any hematuria, hematochezia, hematemesis, and melena. Her HGB has since normalized, she is not taking any oral iron. She has presented to the ER several times during 05/2022 for CHF, CKD. She has LE edema with ulcers and current cellulitis infection, she has been started on antibiotics. INTERIM HISTORY: The patient presents today to review work up and discuss recommendations. She is undergoing dialysis. PAST MEDICAL HISTORY: has a past medical history of Acute CVA (cerebrovascular accident) ([address] Acute on chronic combined systolic (congestive) and diastolic (congestive) heart failure (HCC), Arthritis, Asthma, Bradycardia, ESRD (end stage renal disease) ([address] Essential hypertension, Hallucinations, Hard of hearing, Head contusion, Iron deficiency anemia, unspecified, Meningioma ([address] Myocardial infarction ([address] Pure hypercholesterolemia, Type 2 diabetes mellitus with stage 4 chronic kidney disease, without long-term current use of insulin ([address] Unspecified venous (peripheral) insufficiency, and Unspecified vitamin D deficiency. PAST SURGICAL HISTORY: has a past surgical history that includes Tonsillectomy and adenoidectomy; Coronary artery bypass graft; intubation (3/7/2022); Thoracentesis (3/10/2022); Upper gastrointestinal endoscopy (N/A, 3/15/2022); Colonoscopy (N/A, 3/15/2022);  IR NONTUNNELED VASCULAR CATHETER > 5 YEARS (6/28/2022); IR TUNNELED CVC PLACE WO SQ PORT/PUMP > 5 YEARS (7/8/2022); and Upper gastrointestinal endoscopy (N/A, 7/21/2022). CURRENT MEDICATIONS:  has a current medication list which includes the following prescription(s): aspirin ec, tiotropium, furosemide, ascorbic acid, pantoprazole, amiodarone, levothyroxine, metoprolol tartrate, albuterol sulfate hfa, blood pressure, vitamin d, compressor/nebulizer, adjust fold cane/york handle, handicap placard, atorvastatin, desloratadine, freestyle lancets, magnesium oxide, miconazole, therapeutic multivitamin-minerals, freestyle lite, blood pressure, blood glucose monitor kit and supplies, quetiapine, ferrous sulfate, epoetin rick-epbx, hydrocortisone, [DISCONTINUED] apixaban, fluticasone-salmeterol, and [DISCONTINUED] amlodipine. ALLERGIES:  is allergic to latex, aleve [naproxen sodium], pioglitazone, claritin [loratadine], keflex [cephalexin], and lisinopril. FAMILY HISTORY: Negative for any hematological or oncological conditions. SOCIAL HISTORY:  reports that she quit smoking about 22 years ago. Her smoking use included cigarettes. She has a 2.50 pack-year smoking history. She has never used smokeless tobacco. She reports that she does not currently use alcohol. She reports that she does not use drugs. REVIEW OF SYSTEMS:   General: No fever or night sweats. Weight is stable. ENT: No double or blurred vision, no tinnitus or hearing problem, no dysphagia or sore throat   Respiratory: No chest pain, no shortness of breath, no cough or hemoptysis. Cardiovascular: Denies chest pain, PND or orthopnea. No L E swelling or palpitations. Gastrointestinal: No nausea or vomiting, abdominal pain, diarrhea or constipation. Genitourinary: Denies dysuria, hematuria, frequency, urgency or incontinence. Neurological: Denies headaches, decreased LOC, no sensory or motor focal deficits.   Musculoskeletal: No arthralgia no back pain or joint swelling. Skin: There are no rashes or bleeding. + several ulcers on her lower legs, edema and cellulitis   Psychiatric: No anxiety, no depression. Endocrine: No diabetes or thyroid disease. Hematologic: No bleeding, no adenopathy. PHYSICAL EXAM: Shows a well appearing 67y.o.-year-old female who is not in pain or distress. Vital Signs: Blood pressure (!) 146/75, pulse (!) 111, temperature 98.2 °F (36.8 °C), temperature source Temporal, weight 135 lb (61.2 kg). HEENT: Normocephalic and atraumatic. Pupils are equal, round, reactive to light and accommodation. Extraocular muscles are intact. Neck: Showed no JVD, no carotid bruit. Lungs: Clear to auscultation bilaterally. Heart: Regular without any murmur. Abdomen: Soft, nontender. No hepatosplenomegaly. Extremities: Lower extremities show no edema, clubbing, or cyanosis. Breasts: Examination not done today.  Neuro exam: intact cranial nerves bilaterally no motor or sensory deficit, gait is normal. Lymphatic: no adenopathy appreciated in the supraclavicular, axillary, cervical or inguinal area    REVIEW OF LABORATORY DATA:   Lab Results   Component Value Date    WBC 8.2 09/19/2022    HGB 11.4 (L) 09/19/2022    HCT 35.7 (L) 09/19/2022    MCV 96.0 09/19/2022     09/19/2022       Chemistry        Component Value Date/Time     (L) 09/19/2022 0540    K 4.2 09/19/2022 0540    CL 97 (L) 09/19/2022 0540    CO2 18 (L) 09/19/2022 0540    BUN 77 (H) 09/19/2022 0540    CREATININE 3.87 (H) 09/19/2022 0540        Component Value Date/Time    CALCIUM 8.8 09/19/2022 0540    ALKPHOS 109 (H) 09/19/2022 0540    AST 15 09/19/2022 0540    ALT 9 09/19/2022 0540    BILITOT 0.2 (L) 09/19/2022 0540        Lab Results   Component Value Date    IRON 80 07/21/2022    TIBC 181 (L) 07/21/2022    FERRITIN 13 03/01/2022     Lab Results   Component Value Date    TSH 0.86 09/19/2022 9/19/22 0540 3/18/21 1415 9/25/20 1636 3/11/20 1117    Vit D, 25-Hydroxy >29.9 ng/mL 51.6  19.0 Low  R, CM  18.1 Low          REVIEW OF RADIOLOGICAL RESULTS:   No results found. IMPRESSION:   Iron deficiency anemia  GI bleed  End-stage renal disease  CHF  Elderly patient    PLAN:   We discussed her current work up which shows recovered vitamin D and her HGB is nearly normalized. Clinically she is well and has started dialysis. She has had [facility] testing done today, we discussed follow up plan. I am writing for lab work to be done piror to next visit. Return in 4 weeks with virtual visit. Scribe Attestation   This note was created by Dian Yap acting as scribe for the physician signing this note  Electronically Signed  Svitlana Sheriff, 9/28/2022  Scribe, Ruth Kunstadter â€“ [employer]. Attending Attestation   Note was reviewed and edited. I am in agreement with the note as entered    Dany Agee MD          This note is created with the assistance of a speech recognition program.  While intending to generate a document that actually reflects the content of the visit, the document can still have some errors including those of syntax and sound a like substitutions which may escape proof reading. It such instances, actual meaning can be extrapolated by contextual diversion. Debora Lai

## 2022-09-28 NOTE — TELEPHONE ENCOUNTER
[facility] order, demographics, insurance info, last progress note, and cbc faxed to [address] [facility]. Copies of all documentation given to phlebotomy to send with kit. Pt here today for lab draw. Order scanned in and copy placed in MA bin for f/u.

## 2022-09-29 LAB
SEND OUT REPORT: NORMAL
TEST NAME: NORMAL

## 2022-10-17 ENCOUNTER — TELEPHONE (OUTPATIENT)
Dept: FAMILY MEDICINE CLINIC | Age: 72
End: 2022-10-17

## 2022-10-17 DIAGNOSIS — H91.8X3 OTHER SPECIFIED HEARING LOSS OF BOTH EARS: Primary | ICD-10-CM

## 2022-10-17 NOTE — TELEPHONE ENCOUNTER
Diagnosis Orders   1.  Other specified hearing loss of both ears  Herberth Ayers MD, Otolaryngology, [place]           Future Appointments   Date Time Provider Angela Enamoradoi   10/18/2022  3:00 PM Hardy Helms MD [facility]   11/2/2022  4:00 PM Eduardo Arthur MD [address] [place] [address]   11/25/2022  3:30 PM Cait White MD [id number]

## 2022-10-17 NOTE — TELEPHONE ENCOUNTER
Patient called to ask about referral for mother to have hearing aides placed. Will like a call back with information once referral is placed.

## 2022-10-18 ENCOUNTER — HOSPITAL ENCOUNTER (OUTPATIENT)
Age: 72
Discharge: HOME | End: 2022-10-18
Payer: MEDICARE

## 2022-10-18 DIAGNOSIS — D50.9 IRON DEFICIENCY ANEMIA, UNSPECIFIED IRON DEFICIENCY ANEMIA TYPE: ICD-10-CM

## 2022-10-18 DIAGNOSIS — R54 FRAIL ELDERLY: ICD-10-CM

## 2022-10-18 DIAGNOSIS — N18.31 STAGE 3A CHRONIC KIDNEY DISEASE (HCC): ICD-10-CM

## 2022-10-18 LAB
ANION GAP SERPL CALCULATED.3IONS-SCNC: 18 MMOL/L (ref 9–17)
BUN BLDV-MCNC: 33 MG/DL (ref 8–23)
CALCIUM SERPL-MCNC: 9.4 MG/DL (ref 8.6–10.4)
CHLORIDE BLD-SCNC: 91 MMOL/L (ref 98–107)
CO2: 16 MMOL/L (ref 20–31)
CREAT SERPL-MCNC: 3.51 MG/DL (ref 0.5–0.9)
FERRITIN: 983 NG/ML (ref 13–150)
FOLATE: >20 NG/ML
GFR SERPL CREATININE-BSD FRML MDRD: 13 ML/MIN/1.73M2
GLUCOSE BLD-MCNC: 92 MG/DL (ref 70–99)
IRON SATURATION: 31 % (ref 20–55)
IRON: 69 UG/DL (ref 37–145)
POTASSIUM SERPL-SCNC: 5.6 MMOL/L (ref 3.7–5.3)
REASON FOR REJECTION: NORMAL
SODIUM BLD-SCNC: 125 MMOL/L (ref 135–144)
TOTAL IRON BINDING CAPACITY: 225 UG/DL (ref 250–450)
UNSATURATED IRON BINDING CAPACITY: 156 UG/DL (ref 112–347)
VITAMIN B-12: 865 PG/ML (ref 232–1245)
ZZ NTE CLEAN UP: ORDERED TEST: NORMAL
ZZ NTE WITH NAME CLEAN UP: SPECIMEN SOURCE: NORMAL

## 2022-10-18 PROCEDURE — 80048 BASIC METABOLIC PNL TOTAL CA: CPT

## 2022-10-18 PROCEDURE — 82728 ASSAY OF FERRITIN: CPT

## 2022-10-18 PROCEDURE — 83550 IRON BINDING TEST: CPT

## 2022-10-18 PROCEDURE — 82607 VITAMIN B-12: CPT

## 2022-10-18 PROCEDURE — 36415 COLL VENOUS BLD VENIPUNCTURE: CPT

## 2022-10-18 PROCEDURE — 83540 ASSAY OF IRON: CPT

## 2022-10-18 PROCEDURE — 82746 ASSAY OF FOLIC ACID SERUM: CPT

## 2022-11-02 ENCOUNTER — TELEMEDICINE (OUTPATIENT)
Dept: ONCOLOGY | Age: 72
End: 2022-11-02
Payer: MEDICARE

## 2022-11-02 DIAGNOSIS — D50.9 IRON DEFICIENCY ANEMIA, UNSPECIFIED IRON DEFICIENCY ANEMIA TYPE: ICD-10-CM

## 2022-11-02 DIAGNOSIS — D64.9 ANEMIA, UNSPECIFIED TYPE: Primary | ICD-10-CM

## 2022-11-02 PROCEDURE — G8427 DOCREV CUR MEDS BY ELIG CLIN: HCPCS | Performed by: INTERNAL MEDICINE

## 2022-11-02 PROCEDURE — 1124F ACP DISCUSS-NO DSCNMKR DOCD: CPT | Performed by: INTERNAL MEDICINE

## 2022-11-02 PROCEDURE — 99213 OFFICE O/P EST LOW 20 MIN: CPT | Performed by: INTERNAL MEDICINE

## 2022-11-02 PROCEDURE — 3017F COLORECTAL CA SCREEN DOC REV: CPT | Performed by: INTERNAL MEDICINE

## 2022-11-02 PROCEDURE — G8400 PT W/DXA NO RESULTS DOC: HCPCS | Performed by: INTERNAL MEDICINE

## 2022-11-02 PROCEDURE — 1090F PRES/ABSN URINE INCON ASSESS: CPT | Performed by: INTERNAL MEDICINE

## 2022-11-02 NOTE — PROGRESS NOTES
Chief Complaint   Patient presents with    Follow-up     Review status of disease    Discuss Labs   Patient seen in clinic via virtual visit oral iron supplementation      DIAGNOSIS:   Iron deficiency anemia  End-stage renal disease  CHF  Elderly patient      BRIEF CASE HISTORY:   Fariba Silva is a very pleasant 67 y.o. female who is referred to us for anemia. She had lab done 03/2022 which showed severe iron deficiency anemia with HGB of 4.4 and was admitted, she received 2 units of blood, colonoscopy and EGD were done and negative, capsule endoscopy has been recommended but she has not consented. During has that stay she also had paracentesis. She was discharged home but remains weak and needs wheelchair. She denies any hematuria, hematochezia, hematemesis, and melena. Her HGB has since normalized, she is not taking any oral iron. She has presented to the ER several times during 05/2022 for CHF, CKD. She has LE edema with ulcers and current cellulitis infection, she has been started on antibiotics. INTERIM HISTORY:   Patient seen in clinic via virtual visit. Patient continues to be on oral iron. Continues to receive dialysis and SHIRA therapy with it. Hemoglobin has improved. Peripheral blood NGS testing does not suggest any abnormality      PAST MEDICAL HISTORY: has a past medical history of Acute CVA (cerebrovascular accident) ([address] Acute on chronic combined systolic (congestive) and diastolic (congestive) heart failure (HCC), Arthritis, Asthma, Bradycardia, ESRD (end stage renal disease) ([address] Essential hypertension, Hallucinations, Hard of hearing, Head contusion, Iron deficiency anemia, unspecified, Meningioma ([address] Myocardial infarction ([address] Pure hypercholesterolemia, Type 2 diabetes mellitus with stage 4 chronic kidney disease, without long-term current use of insulin ([address] Unspecified venous (peripheral) insufficiency, and Unspecified vitamin D deficiency.     PAST SURGICAL HISTORY: has a past surgical history that includes Tonsillectomy and adenoidectomy; Coronary artery bypass graft; intubation (3/7/2022); Thoracentesis (3/10/2022); Upper gastrointestinal endoscopy (N/A, 3/15/2022); Colonoscopy (N/A, 3/15/2022); IR NONTUNNELED VASCULAR CATHETER > 5 YEARS (6/28/2022); IR TUNNELED CVC PLACE WO SQ PORT/PUMP > 5 YEARS (7/8/2022); and Upper gastrointestinal endoscopy (N/A, 7/21/2022). CURRENT MEDICATIONS:  has a current medication list which includes the following prescription(s): tiotropium, quetiapine, furosemide, ferrous sulfate, ascorbic acid, pantoprazole, epoetin rick-epbx, amiodarone, hydrocortisone, levothyroxine, metoprolol tartrate, albuterol sulfate hfa, fluticasone-salmeterol, blood pressure, vitamin d, compressor/nebulizer, adjust fold cane/york handle, handicap placard, atorvastatin, desloratadine, freestyle lancets, magnesium oxide, miconazole, therapeutic multivitamin-minerals, freestyle lite, blood pressure, blood glucose monitor kit and supplies, aspirin ec, [DISCONTINUED] apixaban, and [DISCONTINUED] amlodipine. ALLERGIES:  is allergic to latex, aleve [naproxen sodium], pioglitazone, claritin [loratadine], keflex [cephalexin], and lisinopril. FAMILY HISTORY: Negative for any hematological or oncological conditions. SOCIAL HISTORY:  reports that she quit smoking about 22 years ago. Her smoking use included cigarettes. She has a 2.50 pack-year smoking history. She has never used smokeless tobacco. She reports that she does not currently use alcohol. She reports that she does not use drugs. REVIEW OF SYSTEMS:   General: No fever or night sweats. Weight is stable. ENT: No double or blurred vision, no tinnitus or hearing problem, no dysphagia or sore throat   Respiratory: No chest pain, no shortness of breath, no cough or hemoptysis. Cardiovascular: Denies chest pain, PND or orthopnea. No L E swelling or palpitations.   Gastrointestinal: No nausea or vomiting, abdominal pain, diarrhea or constipation. Genitourinary: Denies dysuria, hematuria, frequency, urgency or incontinence. Neurological: Denies headaches, decreased LOC, no sensory or motor focal deficits. Musculoskeletal: No arthralgia no back pain or joint swelling. Skin: There are no rashes or bleeding. + several ulcers on her lower legs, edema and cellulitis   Psychiatric: No anxiety, no depression. Endocrine: No diabetes or thyroid disease. Hematologic: No bleeding, no adenopathy. PHYSICAL EXAMINATION:    Vital Signs: (As obtained by patient/caregiver or practitioner observation)    Blood pressure-  Heart rate-    Respiratory rate-    Temperature-  Pulse oximetry-     Constitutional: [x] Appears well-developed and well-nourished [x] No apparent distress      [] Abnormal-   Mental status   [x]Alert and awake  [x] Oriented to person/place/time [x]Able to follow commands      Eyes:  EOM    [x]  Normal  [] Abnormal-  Sclera  [x]  Normal  [] Abnormal -         Discharge [x]  None visible  [] Abnormal -    HENT:   [x] Normocephalic, atraumatic.   [] Abnormal   [x] Mouth/Throat: Mucous membranes are moist.     External Ears [x] Normal  [] Abnormal-     Neck: [x] No visualized mass     Pulmonary/Chest: [x] Respiratory effort normal.  [x] No visualized signs of difficulty breathing or respiratory distress        [] Abnormal-      Musculoskeletal:   [] Normal gait with no signs of ataxia         [x] Normal range of motion of neck        [] Abnormal-       Neurological:        [x] No Facial Asymmetry (Cranial nerve 7 motor function) (limited exam to video visit)          [] No gaze palsy        [] Abnormal-         Skin:        [x] No significant exanthematous lesions or discoloration noted on facial skin         [] Abnormal-            Psychiatric:       [x] Normal Affect [x] No Hallucinations        [] Abnormal-     Other pertinent observable physical exam findings-     Due to this being a TeleHealth encounter, evaluation of the following organ systems is limited: Vitals/Constitutional/EENT/Resp/CV/GI//MS/Neuro/Skin/Heme-Lymph-Imm. REVIEW OF LABORATORY DATA:   Lab Results   Component Value Date    WBC 8.2 09/19/2022    HGB 11.4 (L) 09/19/2022    HCT 35.7 (L) 09/19/2022    MCV 96.0 09/19/2022     09/19/2022       Chemistry        Component Value Date/Time     (L) 10/18/2022 1041    K 5.6 (H) 10/18/2022 1041    CL 91 (L) 10/18/2022 1041    CO2 16 (L) 10/18/2022 1041    BUN 33 (H) 10/18/2022 1041    CREATININE 3.51 (H) 10/18/2022 1041        Component Value Date/Time    CALCIUM 9.4 10/18/2022 1041    ALKPHOS 109 (H) 09/19/2022 0540    AST 15 09/19/2022 0540    ALT 9 09/19/2022 0540    BILITOT 0.2 (L) 09/19/2022 0540        Lab Results   Component Value Date    IRON 69 10/18/2022    TIBC 225 (L) 10/18/2022    FERRITIN 983 (H) 10/18/2022     Lab Results   Component Value Date    TSH 0.86 09/19/2022 9/19/22 0540 3/18/21 1415 9/25/20 1636 3/11/20 1117    Vit D, 25-Hydroxy >29.9 ng/mL 51.6  19.0 Low  R, CM  18.1 Low          REVIEW OF RADIOLOGICAL RESULTS:   No results found. IMPRESSION:   Iron deficiency anemia  GI bleed  End-stage renal disease  CHF  Elderly patient    PLAN:   Detailed discussion with the patient. Reviewed results of peripheral blood NGS testing which revealed benign etiology of anemia. Continue iron supplementation with target ferritin above 100 and iron saturation above 20%  SHIRA therapy with hemodialysis  We will see patient back in office on as-needed basis        Christopher Carbone MD          This note is created with the assistance of a speech recognition program.  While intending to generate a document that actually reflects the content of the visit, the document can still have some errors including those of syntax and sound a like substitutions which may escape proof reading. It such instances, actual meaning can be extrapolated by contextual diversion.       Terra Chand Harsh, was evaluated through a synchronous (real-time) audio-video encounter. The patient (or guardian if applicable) is aware that this is a billable service, which includes applicable co-pays. This Virtual Visit was conducted with patient's (and/or legal guardian's) consent. The visit was conducted pursuant to the emergency declaration under the [facility], [address] [place] [address] authority and the [employer] General Act. Patient identification was verified, and a caregiver was present when appropriate. The patient was located at Home: [address] [place] [address]. Provider was located at  ([place] [id number]): [address] [place] [address] 36.. Total time spent for this encounter: Not billed by time    --Savannah Hale MD on 11/13/2022 at 7:09 PM    An electronic signature was used to authenticate this note.

## 2022-11-03 ENCOUNTER — TELEPHONE (OUTPATIENT)
Dept: FAMILY MEDICINE CLINIC | Age: 72
End: 2022-11-03

## 2022-11-03 ENCOUNTER — TELEPHONE (OUTPATIENT)
Dept: ONCOLOGY | Age: 72
End: 2022-11-03

## 2022-11-03 NOTE — TELEPHONE ENCOUNTER
Daughter has questions regarding her moms Inhaler and Metoprolol since they had to switch her dialysis around. She did confirm her appointment on 11/25/2022. She stated she would rather discuss the questions with you rather than have me type everything out. I told her you were seeing patients and would possibly call her after you were done.

## 2022-11-03 NOTE — TELEPHONE ENCOUNTER
Please check with daughter and type in what she says    You can also check what time I leave and when I finish last patient  Also covering for my partner on  today  Also on call for this week      Future Appointments   Date Time Provider Angela Reyez   11/25/2022  3:30 PM [facility]

## 2022-11-04 NOTE — TELEPHONE ENCOUNTER
PATIENTS DAUGHTER CALLED BACK STATING SHE WAS JUST WANTING TO KNOW IF THE INHALER TIME FRAME OF INHALER CAN BE CHANGED DUE TO PATIENT DOESN'T WANT TO WAKE UP AT 2 AM.

## 2022-11-04 NOTE — TELEPHONE ENCOUNTER
I called her daughter Shari Souza, okay to give the inhaler anytime, morning and afternoon  Regarding the pills the problem was if she takes the medication after dialysis it will be too late   Her dialysis starts at 11 am    While she was in nursing home, her dialysis was starting at 6:30 AM  Clarifications given, she was pleased with the answer

## 2022-11-04 NOTE — TELEPHONE ENCOUNTER
Please call daughter and tell her Albuterol is only as needed every 6 hrs,   So doesn't need to wake up at 2 am      Spiriva it only once a day, and Advair 2 puffs twice a day and rinse mouth after use

## 2022-11-04 NOTE — TELEPHONE ENCOUNTER
Patient daughter would like to know if medication should be taken before, or after dialysis  she is wanting clarification on pill form medication and inhaler's ? ?

## 2022-11-07 ENCOUNTER — TELEPHONE (OUTPATIENT)
Dept: FAMILY MEDICINE CLINIC | Age: 72
End: 2022-11-07

## 2022-11-07 NOTE — TELEPHONE ENCOUNTER
[facility] Initial Follow Up Call    Outreach made within 2 business days of discharge: No    Patient: Kulwinder Vazquez Patient : 1950   MRN: [id number]  Reason for Admission: There are no discharge diagnoses documented for the most recent discharge. Discharge Date: 22       Spoke with: DAUGHTER/ORAL HARSH    Discharge department/facility: [facility] Interactive Patient Contact:  Was patient able to fill all prescriptions: Yes  Was patient instructed to bring all medications to the follow-up visit: Yes  Is patient taking all medications as directed in the discharge summary?  Yes  Does patient understand their discharge instructions: Yes  Does patient have questions or concerns that need addressed prior to 7-14 day follow up office visit: YES    Scheduled appointment with PCP within 7-14 days    Follow Up  Future Appointments   Date Time Provider Angela Reyez   2022  3:30 PM Cait White MD [facility] [address] [place], [place]

## 2022-11-10 PROBLEM — N18.6 END STAGE RENAL DISEASE (HCC): Status: ACTIVE | Noted: 2022-07-18

## 2022-11-10 PROBLEM — I25.2 OLD MYOCARDIAL INFARCTION: Status: ACTIVE | Noted: 2022-07-18

## 2022-11-10 PROBLEM — I13.2 HYPERTENSIVE HEART AND CHRONIC KIDNEY DISEASE WITH HEART FAILURE AND WITH STAGE 5 CHRONIC KIDNEY DISEASE, OR END STAGE RENAL DISEASE (HCC): Status: ACTIVE | Noted: 2022-07-18

## 2022-11-10 PROBLEM — J45.20 MILD INTERMITTENT ASTHMA, UNCOMPLICATED: Status: ACTIVE | Noted: 2022-07-18

## 2022-11-10 NOTE — PROGRESS NOTES
Post-Discharge Transitional Care Management Progress Note      Des Lai   YOB: 1950    [facility]. Date of Office Visit:  11/11/2022  Date of Hospital Admission: June 2022  Date of Hospital Discharge: 1 week ago    Care management risk score Rising risk (score 2-5) and Complex Care (Scores >=6): No Risk Score On File     Non face to face  following discharge, date last encounter closed (first attempt may have been earlier): 11/07/2022 11/07/2022    Call initiated 2 business days of discharge: No    Chief Complaint   Patient presents with    Follow-Up from [address]    Discuss Medications     LORAZEPAM     Immunizations     NO TO FLU/PNEUMO     Des Lai is a 67 y.o. female patient. Patient is an established patient of Dr. Gayle Posadas. Patient has a known history of Congestive Heart Failure, cellulitis, cystitis, Hypertension, ckd, Hyperlipidemia, cad, . She arrived with her grand daughter who takes care of her. Bita Barnes. She does not want any home health until they rearrange their living situation. This will happen in the next few weeks. Patient and patient's daughter is instructed to call the office so we can then order the home health. WOUND  Patient presented with a recurrent pressure sore on her sacral area since patient is sedentary. Patient have had some stage III wound on her sacral area which improved. Patient was fine when she was at the nursing home. However, daughter noted that the area is starting to get more red patient reports mild pain. Patient came in with nonblanchable area. Declined to have any home health and declined to do any wound care referral at the moment we will provide zinc and hydrocolloid pad and also eggcrate.   DME EGG CRATE  Des Lai is a 67 y.o. female patient will benefit on a hospital bed but at this point she will benefit on an eggcrate bed Topper so that her pressure sore will not worsen. Procedures    [place] DISCHARGE MEDS RECONCILED W/ CURRENT OUTPATIENT MED LIST      CAD/ CHF-  Deonte Lyons is a 67 y.o. female with a known history of coronary artery disease, congestive heart failure. She denies CP or SOB some FLORES, some edema,Patient is compliant all of the time with her therapy/medications. She states she is compliant with low salt diet. Patient is established with Dr. Joe Gillis seen recently     Lab Results   Component Value Date    LVEF 55 02/07/2022   Results for Yolanda Rothman (MRN [id number]) as of 12/7/2021 18:50   Ref. Range 12/6/2021 11:50   Pro-BNP Latest Ref Range: <300 pg/mL 2,575 (H)     ATRIAL FIBRILLATION  Stable, no CP, palpitation,  HYPERTENSION  Deonte Lyons has a well controlled hypertension. she is currently on cozaar, amiodarone,metoprolol. Patient's most recent BP in the office was stable. she reports stable BP readings at home. Patient denies any adverse reaction to this therapy. she denies any CP, SOB, HA, or palpitations. Patient admits to exercising and adheres to low salt diet. No history of organ damage due to condition noted. Lab Results   Component Value Date/Time    CREATININE 3.51 (H) 10/18/2022 10:41 AM     BP Readings from Last 3 Encounters:   11/11/22 112/70   09/28/22 (!) 146/75   08/03/22 138/66     ESRD--  HD------- Demario Calixto has a stage 5CKD. Patient is under the care of DR. Novak-- right chest tunneled catheter access  Andrey Caldwell -- Tuesday Thursday Saturday  Encouraged to follow up with nephrologist.  Lab Results   Component Value Date/Time    K 5.6 (H) 10/18/2022 10:41 AM    BUN 33 (H) 10/18/2022 10:41 AM    CREATININE 3.51 (H) 10/18/2022 10:41 AM    LABGLOM 13 (L) 10/18/2022 10:41 AM    GFRAA 14 (L) 09/19/2022 05:40 AM     Lab Results   Component Value Date    URICACID 3.7 05/18/2022     Lab Results   Component Value Date/Time    MG 1.4 07/29/2022 01:11 AM     DIABETES MELLITUS increasing A1c no meds  Patient has a  stable Diabetes Mellitus. Current therapy includes diet control. Patient reports home glucose monitoring as Stable readings. Patient denieshypoglycemia episodes such as{symptoms. Patient denies neither vomiting nor diarrhea. Lab Results   Component Value Date/Time    LABA1C 6.4 11/11/2022 10:59 AM    LABA1C 4.6 03/31/2022 11:21 AM      HYPERLIPIDEMIA / CAD  Nestor José is currently on atorvastatin (Lipitor). Patient denies adverse reaction to this medication. Compliance with treatment thus far has been good. The patient is known to have coexisting coronary artery disease. The CVD Risk score (DANIELLA'Agostino, et al., 2008) failed to calculate for the following reasons: The patient has a prior MI, stroke, CHF, or peripheral vascular disease diagnosis  Lab Results   Component Value Date/Time    CHOL 116 09/19/2022 05:40 AM    HDL 46 09/19/2022 05:40 AM    LDLCHOLESTEROL 41 09/19/2022 05:40 AM    CHOLHDLRATIO 2.5 09/19/2022 05:40 AM    TRIG 144 09/19/2022 05:40 AM    VLDL NOT REPORTED 12/06/2021 11:50 AM     GERD   Nestor José is a 67 y.o. female patient currently on omeprazole reports fair control of her symptoms which is normally heartburn and some mild epigastric pain denies any blood in her stool     [address] reported some ongoing issues with depression and anxiety. S  Current therapy includes patient was on Seroquel but stopped we will start hydroxyzine discussed the inappropriateness of lorazepam.  Case was discussed with  prior to visit. , she also denies suicidal/homicidal ideation, plan or intent. PHQ-2 Over the past 2 weeks, how often have you been bothered by any of the following problems? Little interest or pleasure in doing things: Not at all  Feeling down, depressed, or hopeless: Not at all  PHQ-2 Score: 0  PHQ-9 Over the past 2 weeks, how often have you been bothered by any of the following problems?   PHQ-9 Total Score: 0  PHQ-9 Total Score: 0   No flowsheet data found. PHQ-9 Over the past 2 weeks, how often have you been bothered by any of the following problems? PHQ-9 Total Score: 0  PHQ-9 Total Score: 0    WEIGHT   lost 10 lbs  Patient's BMI is Body mass index is 25.41 kg/m². kg/m2. BMI is decreasing. Patient understands that this condition increases the patient's risk for chronic conditions. Wt Readings from Last 3 Encounters:   11/11/22 125 lb 12.8 oz (57.1 kg)   09/28/22 135 lb (61.2 kg)   08/03/22 130 lb 8 oz (59.2 kg)       ASSESSMENT/PLAN:   Moderate anxiety  Worsening  Start Ativan  Continue current therapy. Discussed how to recognize anxiety. Advised to relieve tension with exercise or a massage. Advised to get enough rest.  Advised to avoid alcohol, caffeine, nicotine, and illegal drugs. Which can increase anxiety level and cause sleep problems. -     hydrOXYzine HCl (ATARAX) 50 MG tablet; Take 1 tablet by mouth as needed for Anxiety 1 tablet before dialysis, Disp-30 tablet, R-1Normal  Stage 5 chronic kidney disease on chronic dialysis (HCC)  Stable  Continue renal consult  Advised to increase fluid intake  Report decrease urine output  Decrease salt intake    -     hydrOXYzine HCl (ATARAX) 50 MG tablet; Take 1 tablet by mouth as needed for Anxiety 1 tablet before dialysis, Disp-30 tablet, R-1Normal  Pressure injury of sacral region, stage 2 (HCC)  Worsening  Advised to Keep site clean and dry. Use zinc oxide  Use Tegaderm with hydrocolloid pads  Declined wound care and home health referral  DISCUSSED AND ADVISED TO:  Apply antibiotic ointment sparingly to site for the next few days. Keep open to air as much as possible. Apply dressing if needed. No pool, lakes, hot tubs until fully healed. Call for worsening redness, streaking, swelling, drainage, pain, and fever/chills. -     Misc.  Devices (EGG CRATE BED PAD) MISC; DAILY Starting Fri 11/11/2022, Disp-1 each, R-0, Normal  -     Zinc Oxide 10 % OINT; Apply BID, Disp-1 each, R-1Normal  -     Hydroactive Dressings (TEGADERM HYDROCOLLOID) MISC; Apply 1 Units topically daily, Disp-30 each, R-1Normal  Hypertensive heart and chronic kidney disease with heart failure and with   Stable  Continue current therapy. DISCUSSED AND ADVISED TO:  Cut down on your salt intake. Cut down on caffeinated drinks, sports drinks. Instructed to check BP at home regularly. Report for any chest pains, shortness of breath, headaches, and lightheadedness. Call the office if your blood pressure continue to be higher than 140/90 or 90/50.  stage 5 chronic kidney disease, or end stage renal disease ([facility]  Type 2 diabetes mellitus with chronic kidney disease on chronic dialysis, without long-term current use of insulin ([facility])  Stable  Diet control  We will continue to monitor Hemoglobin A1c   DISCUSSED and ADVISED TO:  Continue to check Glucose levels at home. Report increased and low levels as discussed. Decrease carbohydrates, sugary drinks, desserts in your diet. Exercise regularly, as tolerated. Try to lose weight.    -     POCT glycosylated hemoglobin (Hb A1C); Future  Mixed hyperlipidemia  Stable  Continue therapy. Advised to decrease the consumption of red meats, fried foods, trans fats, sweets, sugary beverages. Advised to increase fish, vegetables, and fruits consumption. Advised to add fiber or OTC supplements in diet. Discussed weight loss which will result in improvement of lipids levels. Advised to increase daily physical activities and add regular exercises. Gastroesophageal reflux disease, unspecified whether esophagitis present  Mobility poor  Stable  Continue therapy  DISCUSSED AND ADVISED TO:  Avoid food triggers. Stop eating large meals close to bedtime  Don't eat meals too close to bedtime  Avoid ASA, NSAID's, caffeine, peppermints, alcohol and tobacco.  Report for worsening symptoms.     Hospital discharge follow-up  Stable  Needs eggcrate bed  Will need home health at 1 point  Will likely need a hospital bed  Declined home health  Declined wound care referral  -     [place] DISCHARGE MEDS RECONCILED W/ CURRENT OUTPATIENT MED LIST      Medical Decision Making: moderate complexity  Return in 3 months (on 2/11/2023) for Keep Prev ApptShira w/ Dr. Emeterio Arthur. On this date 11/11/2022 I have spent 35 minutes reviewing previous notes, test results and face to face with the patient discussing the diagnosis and importance of compliance with the treatment plan as well as documenting on the day of the visit. Subjective:   HPI:  Follow up of Hospital problems/diagnosis(es): above    Inpatient course: Discharge summary reviewed- see chart.     Interval history/Current status: above    Patient Active Problem List   Diagnosis    Vitamin D deficiency    Venous insufficiency of both lower extremities    Type 2 diabetes mellitus with chronic kidney disease on chronic dialysis, without long-term current use of insulin ([address]    Coronary artery disease involving native coronary artery of native heart without angina pectoris    Iron deficiency anemia    Stage 4 chronic kidney disease ([address]    Colonoscopy refused    Pneumococcal vaccination declined    Partial deafness of both ears    Acute on chronic diastolic congestive heart failure (HCC)    COPD (chronic obstructive pulmonary disease) ([address]    CKD stage 4 secondary to hypertension ([address]    Gout with tophi    Mixed hyperlipidemia    Dry skin dermatitis HANDS    Toxic metabolic encephalopathy    Meningioma, cerebral (HCC)    Paroxysmal atrial fibrillation ([address]    Influenza vaccination declined    Kidney stones    Diverticulosis    COVID-19 vaccination declined    JOY (acute kidney injury) ([address]    Chronic venous stasis dermatitis of both lower extremities    Symptomatic anemia    Family history of colon cancer    Family history of pancreatic cancer    Mild malnutrition (HCC)    Decreased strength, endurance, and mobility    Bilateral lower extremity edema    Leg ulcer, left, with fat layer exposed ([address]    Lymphedema    Peripheral vascular disease, unspecified (HCC)    Ulcer of right leg, with fat layer exposed ([address]    Acute kidney injury superimposed on CKD (HCC)    Bradycardia    Obesity (BMI 30-39. 9)    TSH elevation    Chronic ulcer of left leg with fat layer exposed ([address]    Hyperkalemia    Shock ([address]    Acute respiratory failure with hypoxia and hypercapnia (HCC)    Pulmonary edema cardiac cause (HCC)    Dependence on renal dialysis (HCC)    Nephrotic range proteinuria    External hemorrhoid    Dysphagia    Debility    Sepsis ([address]    Arterial hypotension    Gastrointestinal hemorrhage    Severe malnutrition (HCC)    Stage 5 chronic kidney disease on chronic dialysis ([address]    Hypertensive heart and chronic kidney disease with heart failure and with stage 5 chronic kidney disease, or end stage renal disease (HCC)    Mild intermittent asthma, uncomplicated    Old myocardial infarction    Gastroesophageal reflux disease    Pressure injury of sacral region, stage 2 ([facility])    Moderate anxiety       Medications listed as ordered at the time of discharge from hospital     Medication List            Accurate as of November 11, 2022  3:14 PM. If you have any questions, ask your nurse or doctor.                 START taking these medications      hydrOXYzine HCl 50 MG tablet  Commonly known as: ATARAX  Take 1 tablet by mouth as needed for Anxiety 1 tablet before dialysis  Started by: MAL Call CNP     Tegaderm Hydrocolloid Misc  Apply 1 Units topically daily  Started by: MAL Call CNP     Zinc Oxide 10 % Oint  Apply BID  Started by: MAL Call CNP            CHANGE how you take these medications      Egg Crate Bed Pad Misc  1 Units by Does not apply route daily  What changed:   how much to take  how to take this  when to take this  additional instructions  Changed by: MAL Call CNP CONTINUE taking these medications      acetaminophen 325 MG tablet  Commonly known as: TYLENOL     albuterol sulfate  (90 Base) MCG/ACT inhaler  Commonly known as: ProAir HFA  Inhale 2 puffs into the lungs every 6 hours as needed for Wheezing or Shortness of Breath (cough)     amiodarone 100 MG tablet  Commonly known as: PACERONE  Take 1 tablet by mouth daily     ascorbic acid 500 MG tablet  Commonly known as: VITAMIN C  Take 1 tablet by mouth in the morning and 1 tablet before bedtime. aspirin EC 81 MG EC tablet  Take 1 tablet by mouth in the morning. atorvastatin 40 MG tablet  Commonly known as: LIPITOR  Take 1 tablet by mouth once daily     * Blood Pressure Kit  1 kit by Does not apply route three times daily     * Blood Pressure Kit  Diagnosis: HTN. Needs to check blood pressure 1-2 times a day until stable, then once a day. Goal blood pressure less than 135/85, and above 110/60. Compressor/Nebulizer Misc  Nebulizer with compressor. Disposable Med Nebs 2 /month. Reusable Med Nebs 1 per 6 months. Aerosol Mask 1 per month. Replacement Filters 2 per month. All other related supplies as needed per month. Medications being used: Albuterol . 083 unit dose vial. Frequency: every 6 hrs x 4/day . Length of Need: 1     desloratadine 5 MG tablet  Commonly known as: CLARINEX  Take 1 tablet by mouth once daily     fluticasone-salmeterol 115-21 MCG/ACT inhaler  Commonly known as: Advair HFA  Inhale 2 puffs into the lungs 2 times daily Maintenance inhaler     Handicap Placard Misc  by Does not apply route Expires on 12/30/25     levothyroxine 25 MCG tablet  Commonly known as: SYNTHROID  Take 1 tablet by mouth Daily     magnesium oxide 400 (241.3 Mg) MG Tabs tablet  Commonly known as: MAG-OX  Take 1 tablet by mouth twice daily     metoprolol tartrate 25 MG tablet  Commonly known as: LOPRESSOR  Take 1 tablet by mouth 2 times daily     midodrine 5 MG tablet  Commonly known as: PROAMATINE     omeprazole 20 MG delayed release capsule  Commonly known as: PRILOSEC     therapeutic multivitamin-minerals tablet  Take 1 tablet by mouth daily     tiotropium 2.5 MCG/ACT Aers inhaler  Commonly known as: SPIRIVA RESPIMAT  Inhale 2 puffs into the lungs every morning (before breakfast)     vitamin D 1.25 MG (69331 UT) Caps capsule  Commonly known as: ERGOCALCIFEROL  Take 1 capsule by mouth once a week Sundays           * This list has 2 medication(s) that are the same as other medications prescribed for you. Read the directions carefully, and ask your doctor or other care provider to review them with you.                 STOP taking these medications      blood glucose monitor kit and supplies  Stopped by: MAL Moreau CNP     epoetin rick-epbx 73442 UNIT/ML Soln injection  Commonly known as: RETACRIT  Stopped by: MAL Moreau CNP     ferrous sulfate 325 (65 Fe) MG tablet  Commonly known as: IRON 325  Stopped by: MAL Moreau CNP     FreeStyle Lancets Misc  Stopped by: MAL Moreau CNP     FREESTYLE LITE strip  Generic drug: blood glucose test strips  Stopped by: MAL Moreau CNP     furosemide 80 MG tablet  Commonly known as: LASIX  Stopped by: MAL Jeff CNP     hydrocortisone 25 MG suppository  Commonly known as: ANUSOL-HC  Stopped by: MAL Jeff CNP     LORazepam 0.5 MG tablet  Commonly known as: ATIVAN  Stopped by: MAL Jeff CNP     miconazole 2 % powder  Commonly known as: MICOTIN  Stopped by: MAL Jeff CNP     pantoprazole 40 MG tablet  Commonly known as: PROTONIX  Stopped by: MAL Jeff CNP     QUEtiapine 25 MG tablet  Commonly known as: SEROQUEL  Stopped by: MAL Jeff CNP               Where to Get Your Medications        These medications were sent to [facility], [address] [place] [address], [place] [address] [place] [address]      Phone: [phone]   Egg Crate Bed Pad Misc  hydrOXYzine HCl 50 MG tablet  Tegaderm Hydrocolloid Misc  Zinc Oxide 10 % Oint           Medications marked \"taking\" at this time  Outpatient Medications Marked as Taking for the 11/11/22 encounter (Office Visit) with MAL Moreau - CNP   Medication Sig Dispense Refill    midodrine (PROAMATINE) 5 MG tablet Take 5 mg by mouth 3 times daily      omeprazole (PRILOSEC) 20 MG delayed release capsule Take 20 mg by mouth daily      acetaminophen (TYLENOL) 325 MG tablet Take 650 mg by mouth every 6 hours as needed for Pain      hydrOXYzine HCl (ATARAX) 50 MG tablet Take 1 tablet by mouth as needed for Anxiety 1 tablet before dialysis 30 tablet 1    Misc. Devices (EGG CRATE BED PAD) MISC 1 Units by Does not apply route daily 1 each 0    Zinc Oxide 10 % OINT Apply BID 1 each 1    Hydroactive Dressings (TEGADERM HYDROCOLLOID) MISC Apply 1 Units topically daily 30 each 1    aspirin EC 81 MG EC tablet Take 1 tablet by mouth in the morning. 30 tablet 0    tiotropium (SPIRIVA RESPIMAT) 2.5 MCG/ACT AERS inhaler Inhale 2 puffs into the lungs every morning (before breakfast) 2 each 1    ascorbic acid (VITAMIN C) 500 MG tablet Take 1 tablet by mouth in the morning and 1 tablet before bedtime. 30 tablet 3    amiodarone (PACERONE) 100 MG tablet Take 1 tablet by mouth daily 30 tablet 0    levothyroxine (SYNTHROID) 25 MCG tablet Take 1 tablet by mouth Daily 30 tablet 3    metoprolol tartrate (LOPRESSOR) 25 MG tablet Take 1 tablet by mouth 2 times daily 60 tablet 3    albuterol sulfate HFA (PROAIR HFA) 108 (90 Base) MCG/ACT inhaler Inhale 2 puffs into the lungs every 6 hours as needed for Wheezing or Shortness of Breath (cough) 18 g 3    fluticasone-salmeterol (ADVAIR HFA) 115-21 MCG/ACT inhaler Inhale 2 puffs into the lungs 2 times daily Maintenance inhaler 36 g 3    Blood Pressure KIT Diagnosis: HTN.  Needs to check blood pressure 1-2 times a day until stable, then once a day. Goal blood pressure less than 135/85, and above 110/60. 1 kit 0    vitamin D (ERGOCALCIFEROL) 1.25 MG (14297 UT) CAPS capsule Take 1 capsule by mouth once a week Sundays 12 capsule 0    Nebulizers (COMPRESSOR/NEBULIZER) MISC Nebulizer with compressor. Disposable Med Nebs 2 /month. Reusable Med Nebs 1 per 6 months. Aerosol Mask 1 per month. Replacement Filters 2 per month. All other related supplies as needed per month. Medications being used: Albuterol . 083 unit dose vial. Frequency: every 6 hrs x 4/day . Length of Need: 1 1 each 3    Handicap Placard MISC by Does not apply route Expires on 12/30/25 1 each 0    atorvastatin (LIPITOR) 40 MG tablet Take 1 tablet by mouth once daily 90 tablet 3    desloratadine (CLARINEX) 5 MG tablet Take 1 tablet by mouth once daily 90 tablet 3    magnesium oxide (MAG-OX) 400 (241.3 Mg) MG TABS tablet Take 1 tablet by mouth twice daily 180 tablet 3    Multiple Vitamins-Minerals (THERAPEUTIC MULTIVITAMIN-MINERALS) tablet Take 1 tablet by mouth daily 90 tablet 3    Blood Pressure KIT 1 kit by Does not apply route three times daily 1 kit 0        Medications patient taking as of now reconciled against medications ordered at time of hospital discharge: Yes    .phys    Objective:    /70   Pulse 87   Temp 97 °F (36.1 °C)   Ht 4' 11\" (1.499 m)   Wt 125 lb 12.8 oz (57.1 kg)   SpO2 98%   BMI 25.41 kg/m²   Physical Exam      An electronic signature was used to authenticate this note.   --Ruby Skelton, APRN - CNP

## 2022-11-11 ENCOUNTER — TELEPHONE (OUTPATIENT)
Dept: FAMILY MEDICINE CLINIC | Age: 72
End: 2022-11-11

## 2022-11-11 ENCOUNTER — OFFICE VISIT (OUTPATIENT)
Dept: FAMILY MEDICINE CLINIC | Age: 72
End: 2022-11-11
Payer: MEDICARE

## 2022-11-11 VITALS
SYSTOLIC BLOOD PRESSURE: 112 MMHG | WEIGHT: 125.8 LBS | HEIGHT: 59 IN | TEMPERATURE: 97 F | HEART RATE: 87 BPM | DIASTOLIC BLOOD PRESSURE: 70 MMHG | OXYGEN SATURATION: 98 % | BODY MASS INDEX: 25.36 KG/M2

## 2022-11-11 DIAGNOSIS — Z74.09 MOBILITY POOR: ICD-10-CM

## 2022-11-11 DIAGNOSIS — N18.6 STAGE 5 CHRONIC KIDNEY DISEASE ON CHRONIC DIALYSIS (HCC): ICD-10-CM

## 2022-11-11 DIAGNOSIS — F41.9 MODERATE ANXIETY: Primary | ICD-10-CM

## 2022-11-11 DIAGNOSIS — L89.152 PRESSURE INJURY OF SACRAL REGION, STAGE 2 (HCC): ICD-10-CM

## 2022-11-11 DIAGNOSIS — Z09 HOSPITAL DISCHARGE FOLLOW-UP: ICD-10-CM

## 2022-11-11 DIAGNOSIS — I13.2 HYPERTENSIVE HEART AND CHRONIC KIDNEY DISEASE WITH HEART FAILURE AND WITH STAGE 5 CHRONIC KIDNEY DISEASE, OR END STAGE RENAL DISEASE (HCC): ICD-10-CM

## 2022-11-11 DIAGNOSIS — N18.6 TYPE 2 DIABETES MELLITUS WITH CHRONIC KIDNEY DISEASE ON CHRONIC DIALYSIS, WITHOUT LONG-TERM CURRENT USE OF INSULIN (HCC): ICD-10-CM

## 2022-11-11 DIAGNOSIS — E11.22 TYPE 2 DIABETES MELLITUS WITH CHRONIC KIDNEY DISEASE ON CHRONIC DIALYSIS, WITHOUT LONG-TERM CURRENT USE OF INSULIN (HCC): ICD-10-CM

## 2022-11-11 DIAGNOSIS — Z99.2 TYPE 2 DIABETES MELLITUS WITH CHRONIC KIDNEY DISEASE ON CHRONIC DIALYSIS, WITHOUT LONG-TERM CURRENT USE OF INSULIN (HCC): ICD-10-CM

## 2022-11-11 DIAGNOSIS — Z99.2 STAGE 5 CHRONIC KIDNEY DISEASE ON CHRONIC DIALYSIS (HCC): ICD-10-CM

## 2022-11-11 DIAGNOSIS — K21.9 GASTROESOPHAGEAL REFLUX DISEASE, UNSPECIFIED WHETHER ESOPHAGITIS PRESENT: ICD-10-CM

## 2022-11-11 DIAGNOSIS — E78.2 MIXED HYPERLIPIDEMIA: ICD-10-CM

## 2022-11-11 LAB — HBA1C MFR BLD: 6.4 %

## 2022-11-11 PROCEDURE — 1090F PRES/ABSN URINE INCON ASSESS: CPT | Performed by: FAMILY MEDICINE

## 2022-11-11 PROCEDURE — G8484 FLU IMMUNIZE NO ADMIN: HCPCS | Performed by: FAMILY MEDICINE

## 2022-11-11 PROCEDURE — G8417 CALC BMI ABV UP PARAM F/U: HCPCS | Performed by: FAMILY MEDICINE

## 2022-11-11 PROCEDURE — 1124F ACP DISCUSS-NO DSCNMKR DOCD: CPT | Performed by: FAMILY MEDICINE

## 2022-11-11 PROCEDURE — 2022F DILAT RTA XM EVC RTNOPTHY: CPT | Performed by: FAMILY MEDICINE

## 2022-11-11 PROCEDURE — G8427 DOCREV CUR MEDS BY ELIG CLIN: HCPCS | Performed by: FAMILY MEDICINE

## 2022-11-11 PROCEDURE — 83036 HEMOGLOBIN GLYCOSYLATED A1C: CPT | Performed by: FAMILY MEDICINE

## 2022-11-11 PROCEDURE — 99214 OFFICE O/P EST MOD 30 MIN: CPT | Performed by: FAMILY MEDICINE

## 2022-11-11 PROCEDURE — G8400 PT W/DXA NO RESULTS DOC: HCPCS | Performed by: FAMILY MEDICINE

## 2022-11-11 PROCEDURE — 3017F COLORECTAL CA SCREEN DOC REV: CPT | Performed by: FAMILY MEDICINE

## 2022-11-11 PROCEDURE — 3044F HG A1C LEVEL LT 7.0%: CPT | Performed by: FAMILY MEDICINE

## 2022-11-11 PROCEDURE — 1036F TOBACCO NON-USER: CPT | Performed by: FAMILY MEDICINE

## 2022-11-11 PROCEDURE — 1111F DSCHRG MED/CURRENT MED MERGE: CPT | Performed by: FAMILY MEDICINE

## 2022-11-11 RX ORDER — HYDROXYZINE 50 MG/1
50 TABLET, FILM COATED ORAL PRN
Qty: 30 TABLET | Refills: 1 | Status: SHIPPED | OUTPATIENT
Start: 2022-11-11

## 2022-11-11 RX ORDER — ACETAMINOPHEN 325 MG/1
650 TABLET ORAL EVERY 6 HOURS PRN
COMMUNITY

## 2022-11-11 RX ORDER — OMEPRAZOLE 20 MG/1
20 CAPSULE, DELAYED RELEASE ORAL DAILY
COMMUNITY
End: 2022-11-14 | Stop reason: SDUPTHER

## 2022-11-11 RX ORDER — HYDROXYZINE 50 MG/1
50 TABLET, FILM COATED ORAL PRN
Qty: 30 TABLET | Refills: 1 | Status: SHIPPED | OUTPATIENT
Start: 2022-11-11 | End: 2022-11-11

## 2022-11-11 RX ORDER — LORAZEPAM 0.5 MG/1
0.5 TABLET ORAL EVERY 6 HOURS PRN
COMMUNITY
End: 2022-11-11

## 2022-11-11 RX ORDER — MIDODRINE HYDROCHLORIDE 5 MG/1
5 TABLET ORAL 2 TIMES DAILY PRN
COMMUNITY
End: 2022-11-25 | Stop reason: SDUPTHER

## 2022-11-11 RX ORDER — HYDROCOLLOID DRESSING 4"X4 3/4"
1 BANDAGE TOPICAL DAILY
Qty: 30 EACH | Refills: 1 | Status: SHIPPED | OUTPATIENT
Start: 2022-11-11 | End: 2022-11-25

## 2022-11-11 SDOH — ECONOMIC STABILITY: FOOD INSECURITY: WITHIN THE PAST 12 MONTHS, THE FOOD YOU BOUGHT JUST DIDN'T LAST AND YOU DIDN'T HAVE MONEY TO GET MORE.: NEVER TRUE

## 2022-11-11 SDOH — ECONOMIC STABILITY: FOOD INSECURITY: WITHIN THE PAST 12 MONTHS, YOU WORRIED THAT YOUR FOOD WOULD RUN OUT BEFORE YOU GOT MONEY TO BUY MORE.: NEVER TRUE

## 2022-11-11 ASSESSMENT — PATIENT HEALTH QUESTIONNAIRE - PHQ9
2. FEELING DOWN, DEPRESSED OR HOPELESS: 0
SUM OF ALL RESPONSES TO PHQ QUESTIONS 1-9: 0
SUM OF ALL RESPONSES TO PHQ QUESTIONS 1-9: 0
1. LITTLE INTEREST OR PLEASURE IN DOING THINGS: 0
SUM OF ALL RESPONSES TO PHQ QUESTIONS 1-9: 0
SUM OF ALL RESPONSES TO PHQ9 QUESTIONS 1 & 2: 0
SUM OF ALL RESPONSES TO PHQ QUESTIONS 1-9: 0

## 2022-11-11 ASSESSMENT — SOCIAL DETERMINANTS OF HEALTH (SDOH): HOW HARD IS IT FOR YOU TO PAY FOR THE VERY BASICS LIKE FOOD, HOUSING, MEDICAL CARE, AND HEATING?: NOT HARD AT ALL

## 2022-11-11 NOTE — TELEPHONE ENCOUNTER
Will do   Note from [address] [place] [address] needs to be printed with order     Diagnosis Orders   1. Pressure injury of sacral region, stage 2 ([address]  Misc.  Devices (EGG CRATE BED PAD) MISC           Future Appointments   Date Time Provider Angela Aissatou   11/25/2022  3:30 PM MD mary [facility]   2/17/2023  2:00 PM [facility]

## 2022-11-11 NOTE — PROGRESS NOTES
Visit Information    Have you changed or started any medications since your last visit including any over-the-counter medicines, vitamins, or herbal medicines? YES   Have you stopped taking any of your medications? Is so, why? -  yes -   Are you having any side effects from any of your medications? - no    Have you seen any other physician or provider since your last visit? yes -    Have you had any other diagnostic tests since your last visit? yes -    Have you been seen in the emergency room and/or had an admission in a hospital since we last saw you?  yes -    Have you had your routine dental cleaning in the past 6 months?  no     Do you have an active MyChart account? If no, what is the barrier?   Yes    Patient Care Team:  Gordo Segal MD as PCP - General (Family Medicine)  Gordo Segal MD as PCP - [facility]  Elif Araya MD as Consulting Physician (Rheumatology)  Bernadette Siddiqi MD as Surgeon (Cardiology)  John Goodwin MD as Consulting Physician (Neurology)  [facility], MD as Consulting Physician (Nephrology)  Madonna Louis MD as Consulting Physician (Urology)  Vivian Freeman MD as Surgeon (Ophthalmology)  Courtney Reynolds MD as Consulting Physician (Gastroenterology)  Los Posada MD as Consulting Physician (Hematology and Oncology)    Medical History Review  Past Medical, Family, and Social History reviewed and does contribute to the patient presenting condition    Health Maintenance   Topic Date Due    COVID-19 Vaccine (1) Never done    Pneumococcal 65+ years Vaccine (1 - PCV) Never done    DTaP/Tdap/Td vaccine (1 - Tdap) Never done    Shingles vaccine (1 of 2) Never done    Hepatitis B vaccine (1 of 3 - Risk Dialysis 4-dose series) Never done    DEXA (modify frequency per FRAX score)  06/01/2017    Diabetic retinal exam  10/19/2021    Flu vaccine (1) Never done    Breast cancer screen  08/28/2022    Diabetic foot exam  09/22/2022    Annual Wellness Visit (AWV)  11/25/2022    A1C test (Diabetic or Prediabetic)  03/31/2023    Depression Screen  08/12/2023    Lipids  09/19/2023    Colorectal Cancer Screen  03/15/2027    Hepatitis C screen  Completed    Hepatitis A vaccine  Aged Out    Hib vaccine  Aged Out    Meningococcal (ACWY) vaccine  Aged Out

## 2022-11-11 NOTE — TELEPHONE ENCOUNTER
Patient daughter called stated that local pharmacy will not isabel script Misc. Devices (EGG CRATE BED PAD) MISC [[id number]]     Order Details  Dose: 1 Units Route: Does not apply Frequency: DAILY   Dispense Quantity: 1 each Refills: 0          Si Units by Does not apply route daily         Start Date: 22 End Date: --   Written Date: 22     Michelle Kang)  stated will need to be sent to medical supply.

## 2022-11-11 NOTE — PATIENT INSTRUCTIONS
New Updates for [facility] MyChart/ [employer] ([facility]) SACHIN    Thank you for choosing US to give you the best care! [facility] ([facility]) is always trying to think of new ways to help their patients. We are asking all patients to try out the new digital registration that is now available through your [facility] account or the new SACHIN, [employer] ([facility]). Via the sachin you're now able to update your personal and registration information prior to your upcoming appointment. This will save you time once you arrive at the office to check-in, not to mention your information remains safe!! Many other perks come from signing up for an account, such as:  Requesting refills  Scheduling an appointment  Completing an E-Visit  Sending a message to the office/provider  Having access to your medication list  Paying your bill/copay prior to your appointment  Scheduling your yearly mammogram  Review your test results    If you are not familiar with [facility] or the [employer] ([facility]) SACHIN, please ask one of us and we will be happy to answer any questions or help you set-up your account.       Your [facility] office,  [place]

## 2022-11-14 DIAGNOSIS — J44.9 CHRONIC OBSTRUCTIVE PULMONARY DISEASE, UNSPECIFIED COPD TYPE (HCC): ICD-10-CM

## 2022-11-14 DIAGNOSIS — K21.9 GASTROESOPHAGEAL REFLUX DISEASE, UNSPECIFIED WHETHER ESOPHAGITIS PRESENT: ICD-10-CM

## 2022-11-14 RX ORDER — OMEPRAZOLE 20 MG/1
20 CAPSULE, DELAYED RELEASE ORAL
Qty: 30 CAPSULE | Refills: 3 | Status: SHIPPED | OUTPATIENT
Start: 2022-11-14

## 2022-11-14 NOTE — TELEPHONE ENCOUNTER
Pts daughter called asking if her   tiotropium (SPIRIVA RESPIMAT) 2.5 MCG/ACT AERS inhaler , levothyroxine (SYNTHROID) 25 MCG tablet   can be filled with the medication refills. Pt has been in the nursing home and has also been in and out of the hospital as well. Please Approve or Refuse.   Send to Pharmacy per Pt's Request:   Marta Lombardo     Next Visit Date:  11/25/2022   Last Visit Date: 11/11/2022    Hemoglobin A1C (%)   Date Value   11/11/2022 6.4   03/31/2022 4.6   11/24/2021 5.8             ( goal A1C is < 7)   BP Readings from Last 3 Encounters:   11/11/22 112/70   09/28/22 (!) 146/75   08/03/22 138/66          (goal 120/80)  BUN   Date Value Ref Range Status   10/18/2022 33 (H) 8 - 23 mg/dL Final     Creatinine   Date Value Ref Range Status   10/18/2022 3.51 (H) 0.50 - 0.90 mg/dL Final     Potassium   Date Value Ref Range Status   10/18/2022 5.6 (H) 3.7 - 5.3 mmol/L Final

## 2022-11-16 ENCOUNTER — TELEPHONE (OUTPATIENT)
Dept: FAMILY MEDICINE CLINIC | Age: 72
End: 2022-11-16

## 2022-11-16 DIAGNOSIS — J44.9 CHRONIC OBSTRUCTIVE PULMONARY DISEASE, UNSPECIFIED COPD TYPE (HCC): Primary | ICD-10-CM

## 2022-11-16 DIAGNOSIS — R79.89 TSH ELEVATION: ICD-10-CM

## 2022-11-16 RX ORDER — LEVOTHYROXINE SODIUM 0.03 MG/1
25 TABLET ORAL DAILY
Qty: 30 TABLET | Refills: 3 | Status: SHIPPED | OUTPATIENT
Start: 2022-11-16 | End: 2022-11-25 | Stop reason: SDUPTHER

## 2022-11-16 NOTE — TELEPHONE ENCOUNTER
Please Approve or Refuse. Send to Pharmacy per Pt's Request: PATIENT ASKING FOR REFILLS ON SYNTHROID 25 MCG AND ALSO SPIRIVA REPIMAT 2.5 MCG INHALER. Fidel Grier.       Next Visit Date:  11/25/2022   Last Visit Date: 11/11/2022    Hemoglobin A1C (%)   Date Value   11/11/2022 6.4   03/31/2022 4.6   11/24/2021 5.8             ( goal A1C is < 7)   BP Readings from Last 3 Encounters:   11/11/22 112/70   09/28/22 (!) 146/75   08/03/22 138/66          (goal 120/80)  BUN   Date Value Ref Range Status   10/18/2022 33 (H) 8 - 23 mg/dL Final     Creatinine   Date Value Ref Range Status   10/18/2022 3.51 (H) 0.50 - 0.90 mg/dL Final     Potassium   Date Value Ref Range Status   10/18/2022 5.6 (H) 3.7 - 5.3 mmol/L Final

## 2022-11-21 ENCOUNTER — TELEPHONE (OUTPATIENT)
Dept: FAMILY MEDICINE CLINIC | Age: 72
End: 2022-11-21

## 2022-11-21 NOTE — TELEPHONE ENCOUNTER
First of all depends on the blood pressure readings, sometimes after dialysis the blood pressure is lower      Second of all, could ask dialysis doctor if okay to skip blood pressure medication in the evening      At the appointment on 11/25/2022 we can change the medications to once a day    She is currently only taking amlodipine 10 Mg which she can take in the morning instead, and metoprolol 25 Mg twice a day which can be changed to metoprolol once a day instead, let me know if she needs refills and I can send to the pharmacy    Metoprolol tartrate which she currently has must be taken twice a day so just take it when she comes back from dialysis      Current Outpatient Medications on File Prior to Visit   Medication Sig Dispense Refill    tiotropium (SPIRIVA RESPIMAT) 2.5 MCG/ACT AERS inhaler Inhale 2 puffs into the lungs every morning (before breakfast) 2 each 1    levothyroxine (SYNTHROID) 25 MCG tablet Take 1 tablet by mouth Daily 30 tablet 3    fluticasone-salmeterol (ADVAIR HFA) 115-21 MCG/ACT inhaler Inhale 2 puffs into the lungs 2 times daily Maintenance inhaler 36 g 3    omeprazole (PRILOSEC) 20 MG delayed release capsule Take 1 capsule by mouth every morning (before breakfast) 30 capsule 3    midodrine (PROAMATINE) 5 MG tablet Take 5 mg by mouth 3 times daily      acetaminophen (TYLENOL) 325 MG tablet Take 650 mg by mouth every 6 hours as needed for Pain      hydrOXYzine HCl (ATARAX) 50 MG tablet Take 1 tablet by mouth as needed for Anxiety 1 tablet before dialysis 30 tablet 1    Zinc Oxide 10 % OINT Apply BID 1 each 1    Hydroactive Dressings (TEGADERM HYDROCOLLOID) MISC Apply 1 Units topically daily 30 each 1    Misc. Devices (EGG CRATE BED PAD) MISC 1 Units by Does not apply route daily 1 each 0    aspirin EC 81 MG EC tablet Take 1 tablet by mouth in the morning. 30 tablet 0    ascorbic acid (VITAMIN C) 500 MG tablet Take 1 tablet by mouth in the morning and 1 tablet before bedtime.  30 tablet 3 amiodarone (PACERONE) 100 MG tablet Take 1 tablet by mouth daily 30 tablet 0    [DISCONTINUED] apixaban (ELIQUIS) 5 MG TABS tablet Take 1 tablet by mouth 2 times daily 60 tablet 0    metoprolol tartrate (LOPRESSOR) 25 MG tablet Take 1 tablet by mouth 2 times daily 60 tablet 3    albuterol sulfate HFA (PROAIR HFA) 108 (90 Base) MCG/ACT inhaler Inhale 2 puffs into the lungs every 6 hours as needed for Wheezing or Shortness of Breath (cough) 18 g 3    [DISCONTINUED] amLODIPine (NORVASC) 10 MG tablet Take 1 tablet by mouth nightly 30 tablet 3    Blood Pressure KIT Diagnosis: HTN. Needs to check blood pressure 1-2 times a day until stable, then once a day. Goal blood pressure less than 135/85, and above 110/60. 1 kit 0    vitamin D (ERGOCALCIFEROL) 1.25 MG (27973 UT) CAPS capsule Take 1 capsule by mouth once a week Sundays 12 capsule 0    Nebulizers (COMPRESSOR/NEBULIZER) MISC Nebulizer with compressor. Disposable Med Nebs 2 /month. Reusable Med Nebs 1 per 6 months. Aerosol Mask 1 per month. Replacement Filters 2 per month. All other related supplies as needed per month. Medications being used: Albuterol . 083 unit dose vial. Frequency: every 6 hrs x 4/day . Length of Need: 1 1 each 3    Handicap Placard MISC by Does not apply route Expires on 12/30/25 1 each 0    atorvastatin (LIPITOR) 40 MG tablet Take 1 tablet by mouth once daily 90 tablet 3    desloratadine (CLARINEX) 5 MG tablet Take 1 tablet by mouth once daily 90 tablet 3    magnesium oxide (MAG-OX) 400 (241.3 Mg) MG TABS tablet Take 1 tablet by mouth twice daily 180 tablet 3    Multiple Vitamins-Minerals (THERAPEUTIC MULTIVITAMIN-MINERALS) tablet Take 1 tablet by mouth daily 90 tablet 3    Blood Pressure KIT 1 kit by Does not apply route three times daily 1 kit 0     No current facility-administered medications on file prior to visit.          Future Appointments   Date Time Provider Angela Reyez   11/25/2022  3:30 PM Graciela Mckeon MD [facility] 2/17/2023  2:00 PM Hilario Bender MD [id number]

## 2022-11-21 NOTE — TELEPHONE ENCOUNTER
Patient daughter called and was given message below. She stated that she will await until her next appt.  To discuss everything

## 2022-11-21 NOTE — TELEPHONE ENCOUNTER
Incoming call came from patient daughter she will like to know is it fine when her mother misses taking her blood pressure medication on days she has dialysis out the week. She stated that they get back really late.

## 2022-11-25 ENCOUNTER — OFFICE VISIT (OUTPATIENT)
Dept: FAMILY MEDICINE CLINIC | Age: 72
End: 2022-11-25
Payer: MEDICARE

## 2022-11-25 ENCOUNTER — TELEPHONE (OUTPATIENT)
Dept: FAMILY MEDICINE CLINIC | Age: 72
End: 2022-11-25

## 2022-11-25 VITALS
HEIGHT: 59 IN | WEIGHT: 125 LBS | BODY MASS INDEX: 25.2 KG/M2 | SYSTOLIC BLOOD PRESSURE: 140 MMHG | HEART RATE: 62 BPM | OXYGEN SATURATION: 91 % | TEMPERATURE: 97.3 F | DIASTOLIC BLOOD PRESSURE: 62 MMHG

## 2022-11-25 DIAGNOSIS — Z00.00 MEDICARE ANNUAL WELLNESS VISIT, SUBSEQUENT: Primary | ICD-10-CM

## 2022-11-25 DIAGNOSIS — Z78.0 POST-MENOPAUSAL: ICD-10-CM

## 2022-11-25 DIAGNOSIS — I48.0 PAROXYSMAL ATRIAL FIBRILLATION (HCC): ICD-10-CM

## 2022-11-25 DIAGNOSIS — I12.0 HYPERTENSIVE CKD, ESRD ON DIALYSIS (HCC): ICD-10-CM

## 2022-11-25 DIAGNOSIS — E78.5 HYPERLIPIDEMIA WITH TARGET LDL LESS THAN 70: ICD-10-CM

## 2022-11-25 DIAGNOSIS — H25.89 OTHER AGE-RELATED CATARACT, UNSPECIFIED LATERALITY: ICD-10-CM

## 2022-11-25 DIAGNOSIS — I95.3 HEMODIALYSIS-ASSOCIATED HYPOTENSION: ICD-10-CM

## 2022-11-25 DIAGNOSIS — L89.301 PRESSURE INJURY OF BUTTOCK, STAGE 1, UNSPECIFIED LATERALITY: ICD-10-CM

## 2022-11-25 DIAGNOSIS — N18.6 HYPERTENSIVE CKD, ESRD ON DIALYSIS (HCC): ICD-10-CM

## 2022-11-25 DIAGNOSIS — Z87.19 HISTORY OF GI BLEED: ICD-10-CM

## 2022-11-25 DIAGNOSIS — H91.93 BILATERAL HEARING LOSS, UNSPECIFIED HEARING LOSS TYPE: ICD-10-CM

## 2022-11-25 DIAGNOSIS — E03.9 ACQUIRED HYPOTHYROIDISM: ICD-10-CM

## 2022-11-25 DIAGNOSIS — Z12.31 ENCOUNTER FOR SCREENING MAMMOGRAM FOR BREAST CANCER: ICD-10-CM

## 2022-11-25 DIAGNOSIS — Z99.2 HYPERTENSIVE CKD, ESRD ON DIALYSIS (HCC): ICD-10-CM

## 2022-11-25 PROBLEM — E66.9 OBESITY (BMI 30-39.9): Status: RESOLVED | Noted: 2022-06-14 | Resolved: 2022-11-25

## 2022-11-25 PROBLEM — G92.8 TOXIC METABOLIC ENCEPHALOPATHY: Status: RESOLVED | Noted: 2020-07-26 | Resolved: 2022-11-25

## 2022-11-25 PROBLEM — E87.5 HYPERKALEMIA: Status: RESOLVED | Noted: 2022-06-28 | Resolved: 2022-11-25

## 2022-11-25 PROBLEM — E43 SEVERE MALNUTRITION (HCC): Chronic | Status: RESOLVED | Noted: 2022-07-22 | Resolved: 2022-11-25

## 2022-11-25 PROBLEM — N18.9 ACUTE KIDNEY INJURY SUPERIMPOSED ON CKD (HCC): Status: RESOLVED | Noted: 2022-06-08 | Resolved: 2022-11-25

## 2022-11-25 PROBLEM — L89.302 PRESSURE INJURY OF BUTTOCK, STAGE 2 (HCC): Status: RESOLVED | Noted: 2022-11-25 | Resolved: 2022-11-25

## 2022-11-25 PROBLEM — L97.922 CHRONIC ULCER OF LEFT LEG WITH FAT LAYER EXPOSED (HCC): Status: RESOLVED | Noted: 2022-06-21 | Resolved: 2022-11-25

## 2022-11-25 PROBLEM — L89.152 PRESSURE INJURY OF SACRAL REGION, STAGE 2 (HCC): Status: RESOLVED | Noted: 2022-11-11 | Resolved: 2022-11-25

## 2022-11-25 PROBLEM — L97.912 ULCER OF RIGHT LEG, WITH FAT LAYER EXPOSED (HCC): Status: RESOLVED | Noted: 2022-05-20 | Resolved: 2022-11-25

## 2022-11-25 PROBLEM — R60.0 BILATERAL LOWER EXTREMITY EDEMA: Status: RESOLVED | Noted: 2022-04-20 | Resolved: 2022-11-25

## 2022-11-25 PROBLEM — N17.9 AKI (ACUTE KIDNEY INJURY) (HCC): Status: RESOLVED | Noted: 2022-01-15 | Resolved: 2022-11-25

## 2022-11-25 PROBLEM — I89.0 LYMPHEDEMA: Status: RESOLVED | Noted: 2022-05-11 | Resolved: 2022-11-25

## 2022-11-25 PROBLEM — L97.922 LEG ULCER, LEFT, WITH FAT LAYER EXPOSED (HCC): Status: RESOLVED | Noted: 2022-04-20 | Resolved: 2022-11-25

## 2022-11-25 PROBLEM — L89.302 PRESSURE INJURY OF BUTTOCK, STAGE 2 (HCC): Status: ACTIVE | Noted: 2022-11-25

## 2022-11-25 PROBLEM — A41.9 SEPSIS (HCC): Status: RESOLVED | Noted: 2022-07-12 | Resolved: 2022-11-25

## 2022-11-25 PROBLEM — N17.9 ACUTE KIDNEY INJURY SUPERIMPOSED ON CKD (HCC): Status: RESOLVED | Noted: 2022-06-08 | Resolved: 2022-11-25

## 2022-11-25 PROCEDURE — 3017F COLORECTAL CA SCREEN DOC REV: CPT | Performed by: FAMILY MEDICINE

## 2022-11-25 PROCEDURE — 1124F ACP DISCUSS-NO DSCNMKR DOCD: CPT | Performed by: FAMILY MEDICINE

## 2022-11-25 PROCEDURE — G8484 FLU IMMUNIZE NO ADMIN: HCPCS | Performed by: FAMILY MEDICINE

## 2022-11-25 PROCEDURE — G0439 PPPS, SUBSEQ VISIT: HCPCS | Performed by: FAMILY MEDICINE

## 2022-11-25 RX ORDER — MIDODRINE HYDROCHLORIDE 5 MG/1
5 TABLET ORAL 2 TIMES DAILY PRN
Qty: 90 TABLET | Refills: 3 | Status: SHIPPED | OUTPATIENT
Start: 2022-11-25

## 2022-11-25 RX ORDER — ATORVASTATIN CALCIUM 40 MG/1
40 TABLET, FILM COATED ORAL EVERY EVENING
Qty: 90 TABLET | Refills: 3 | Status: SHIPPED | OUTPATIENT
Start: 2022-11-25

## 2022-11-25 RX ORDER — MIDODRINE HYDROCHLORIDE 5 MG/1
5 TABLET ORAL 2 TIMES DAILY PRN
Qty: 180 TABLET | Refills: 0 | Status: SHIPPED | OUTPATIENT
Start: 2022-11-25

## 2022-11-25 RX ORDER — LEVOTHYROXINE SODIUM 0.03 MG/1
25 TABLET ORAL
Qty: 90 TABLET | Refills: 3 | Status: SHIPPED | OUTPATIENT
Start: 2022-11-25

## 2022-11-25 RX ORDER — METOPROLOL SUCCINATE 25 MG/1
25 TABLET, EXTENDED RELEASE ORAL EVERY EVENING
Qty: 90 TABLET | Refills: 3 | Status: SHIPPED | OUTPATIENT
Start: 2022-11-25

## 2022-11-25 RX ORDER — FUROSEMIDE 80 MG
80 TABLET ORAL EVERY MORNING
Qty: 90 TABLET | Refills: 3 | Status: SHIPPED | OUTPATIENT
Start: 2022-11-25 | End: 2022-11-28 | Stop reason: SDUPTHER

## 2022-11-25 RX ORDER — LEVOTHYROXINE SODIUM 0.03 MG/1
25 TABLET ORAL
Qty: 30 TABLET | Refills: 0 | Status: SHIPPED | OUTPATIENT
Start: 2022-11-25

## 2022-11-25 RX ORDER — METOPROLOL SUCCINATE 25 MG/1
25 TABLET, EXTENDED RELEASE ORAL EVERY EVENING
Qty: 30 TABLET | Refills: 0 | Status: SHIPPED | OUTPATIENT
Start: 2022-11-25

## 2022-11-25 RX ORDER — FUROSEMIDE 80 MG
80 TABLET ORAL EVERY MORNING
COMMUNITY
End: 2022-11-25 | Stop reason: SDUPTHER

## 2022-11-25 RX ORDER — AMIODARONE HYDROCHLORIDE 100 MG/1
100 TABLET ORAL EVERY MORNING
Qty: 90 TABLET | Refills: 3 | Status: SHIPPED | OUTPATIENT
Start: 2022-11-25

## 2022-11-25 RX ORDER — AMMONIUM LACTATE 12 G/100G
CREAM TOPICAL
Qty: 385 G | Refills: 4 | Status: SHIPPED | OUTPATIENT
Start: 2022-11-25 | End: 2022-12-25

## 2022-11-25 ASSESSMENT — PATIENT HEALTH QUESTIONNAIRE - PHQ9
1. LITTLE INTEREST OR PLEASURE IN DOING THINGS: 0
2. FEELING DOWN, DEPRESSED OR HOPELESS: 0
SUM OF ALL RESPONSES TO PHQ QUESTIONS 1-9: 0
SUM OF ALL RESPONSES TO PHQ QUESTIONS 1-9: 0
SUM OF ALL RESPONSES TO PHQ9 QUESTIONS 1 & 2: 0
SUM OF ALL RESPONSES TO PHQ QUESTIONS 1-9: 0
SUM OF ALL RESPONSES TO PHQ QUESTIONS 1-9: 0

## 2022-11-25 ASSESSMENT — LIFESTYLE VARIABLES
HOW OFTEN DO YOU HAVE A DRINK CONTAINING ALCOHOL: NEVER
HOW MANY STANDARD DRINKS CONTAINING ALCOHOL DO YOU HAVE ON A TYPICAL DAY: PATIENT DOES NOT DRINK

## 2022-11-25 NOTE — PROGRESS NOTES
Medicare Annual Wellness Visit    Jamarcus Ordaz is here for Medicare AWV    Assessment & Plan   Medicare annual wellness visit, subsequent  Updated DEXA scan and mammogram orders  Referrals for ophthalmologist and ENT placed  Patient absolutely declines home care at this time, her daughter says they need to rearrange the house before she is accepting home care. Her family members, her adult children, are very involved in helping her with ADLs and to stay comfortable at home, and helping her with ADLs. Patient has questions regarding her end-stage kidney disease, currently on port, she wanted to know my opinion if she should get AV fistula, I strongly advised her to consider that, I explained to her high risk of infection of the port. She seems to understand    We also had a long discussion regarding immunizations, patient declares that she is very afraid of needles, and a needle would leave a hole in her arm and it will hurt, but she is finally agreeable to get the immunizations through the dialysis center including the COVID-19 vaccine, because they cannot her arm with lidocaine at the time of giving the injection for immunizations   I did make her a letter to receive pneumonia shot, flu shot, and possibly COVID-19 vaccine at the dialysis center, I gave personally to the patient. I will also communicate with Dr. La Nena Brooks to make arrangements for her to lizette her immunizations.     Compliance with dialysis 3 times a week discussed  Advance care directives addressed with her daughter and patient and they will complete the forms and bring a copy back   We will change medications to once a day, has been having difficulty taking metoprolol twice a day  Clarification regarding need for anticoagulation discussed, however patient did have recent GI bleed with hemoglobin as low as 4.4 on 3/2022 requiring blood transfusions, she was then discharged to nursing home where chronic anticoagulation and antiplatelet therapy were discontinued. post-menopausal  -     DEXA Bone Density Axial Skeleton; Future  Encounter for screening mammogram for breast cancer  -     [facility] DIGITAL SCREEN BILATERAL; Future  Hyperlipidemia with target LDL less than 70  Refills given  Well-controlled  Lab Results   Component Value Date    LDLCHOLESTEROL 41 09/19/2022       -     atorvastatin (LIPITOR) 40 MG tablet; Take 1 tablet by mouth every evening Take 1 tablet by mouth once daily, Disp-90 tablet, R-3Normal  Acquired hypothyroidism  Ran out  Lab Results   Component Value Date    TSH 0.86 09/19/2022   Prescriptions given for local pharmacy and mail order pharmacy    -restart     levothyroxine (SYNTHROID) 25 MCG tablet; Take 1 tablet by mouth every morning (before breakfast) without no other meds for 30 minutes, take when you first wake up and you are still bed, Disp-90 tablet, R-3Normal  -     levothyroxine (SYNTHROID) 25 MCG tablet; Take 1 tablet by mouth every morning (before breakfast) 30 mins before taking any other meds, Disp-30 tablet, R-0Normal  Other age-related cataract, unspecified laterality  Worsening, she will need clearance for cataract removal  -     [facility] - Armen Contreras MD, Ophthalmology, [place]    Hemodialysis-associated hypotension--written instructions given  -     midodrine (PROAMATINE) 5 MG tablet; Take 1 tablet by mouth 2 times daily as needed (low BP, below 115/65) For BP below 115/65. Take with you to dialysis, Disp-180 tablet, R-0Normal  -     midodrine (PROAMATINE) 5 MG tablet; Take 1 tablet by mouth 2 times daily as needed (if low BP below 115/65, take to dialysis), Disp-90 tablet, R-3Normal  Pressure injury of buttock, stage 1, unspecified laterality healing  Frequently advised to change position, she does have a good pillow in the wheelchair  -     mupirocin (BACTROBAN) 2 % ointment; Apply topically 3 times daily x 10 days. , Disp-30 g, R-3, Normal  Currently applying zinc oxide ointment.   Paroxysmal atrial fibrillation ([address]  Stable  Change metoprolol tartrate to metoprolol XL and continue amiodarone  Not a candidate for anticoagulation due to prior GI bleeding which was severe  -     amiodarone (PACERONE) 100 MG tablet; Take 1 tablet by mouth every morning, Disp-90 tablet, R-3Normal  -     AFL - Jann Duong DO, Cardiology, [facility]  Hypertensive CKD, ESRD on dialysis ([address]  Well-controlled, but fluctuating, hypotensive after getting dialysis  -     metoprolol succinate (TOPROL XL) 25 MG extended release tablet; Take 1 tablet by mouth every evening Hold if BP bellow 115/65. Stop metoprolol tartrate, Disp-90 tablet, R-3Normal  -     furosemide (LASIX) 80 MG tablet; Take 1 tablet by mouth every morning Water pill, Disp-90 tablet, R-3Normal  -     metoprolol succinate (TOPROL XL) 25 MG extended release tablet; Take 1 tablet by mouth every evening Hold if BP below 115/65, replacing twice a day metoprolol, Disp-30 tablet, R-0Normal  Bilateral hearing loss, unspecified hearing loss type  Worsening  Patient would benefit from hearing aids  -     Shu Hood MD, Otolaryngology, [facility]  History of GI bleed  Off aspirin and rivaroxaban, records reviewed with patient and her daughter and a printed copy from the hematologist oncologist given  Currently erythropoietin during dialysis  -     AFL - Ad, [address], , Cardiology, [facility]    Patient advised against taking any multivitamin until she discusses with nephrologist  Stop vitamin D due to levels being normal      Recommendations for Preventive Services Due: see orders and patient instructions/AVS.  Recommended screening schedule for the next 5-10 years is provided to the patient in written form: see Patient Instructions/AVS.     Return in 1 year (on 11/26/2023) for Medicare Annual Wellness Visit in 1 year, Visit type MEDICARE, [facility] [facility], [address] [place] [address] [facility] Subjective     Patient comes with her daughter, Christy Oh.     Patient reports she still has buttock sore from prolonged seating. She says it has been healing, they have been applying Zinc oxide which has been helping    Patient has started dialysis. She has questions. Has right upper chest port for 6 months and she was told that she needs AV fistula, I did confirm this, I discussed with her risk of getting infection from prolonged use of port. Patient reports decreased urinary output, and wanting a water pill, she is actually on furosemide 80 mg. Patient says she has been losing 5 lbs during dialysis, but gains back 2 lbs in 1 day. I explained that this is related to end-stage kidney disease. Declines PT homecare due to changing rooms but she says she would be open to start home physical therapy after Christmas. Patient's complete Health Risk Assessment and screening values have been reviewed and are found in Flowsheets. The following problems were reviewed today and where indicated follow up appointments were made and/or referrals ordered. Positive Risk Factor Screenings with Interventions:    Fall Risk:  Do you feel unsteady or are you worried about falling? : (!) yes  2 or more falls in past year?: no  Fall with injury in past year?: no   Fall Risk Interventions:    Home safety tips provided  Home exercises provided to promote strength and balance  Patient declines any further evaluation/treatment for this issue  Patient says she Will get Home care  after the Christmas    Cognitive: Words recalled: 1 Word Recalled  Clock Drawing Test (CDT): (!) Abnormal (cannot see)  Total Score Interpretation: Abnormal Mini-Cog  Did the patient refuse to take the cognition test?: No  Cognitive Impairment Interventions:  Patient declines any further evaluation/treatment for cognitive impairment  Cannot see due to severe cataracts bilaterally, her daughter says she needs surgeries for both and she has appointment.            General Health and ACP:  General  In general, how would you say your health is?: Good  In the past 7 days, have you experienced any of the following: New or Increased Pain, New or Increased Fatigue, Loneliness, Social Isolation, Stress or Anger?: (!) Yes  Select all that apply: (!) Stress  Do you get the social and emotional support that you need?: Yes  Do you have a Living Will?: (!) No    Advance Directives       Power of  Living Will ACP-Advance Directive ACP-Power of     Not on File Not on File Not on File Not on File          General Health Risk Interventions:  Stress: patient's comments regarding reasons for stress and/or anger: Regarding her medical health, however she is willing to go to dialysis 3 times a week and not to miss, I reinforced this to her, she does have good family support, relaxation techniques discussed, hydroxyzine was prescribed for her for anxiety  No Living Will: Advance Care Planning addressed with patient today    Health Habits/Nutrition:  Physical Activity: Inactive    Days of Exercise per Week: 0 days    Minutes of Exercise per Session: 0 min     Have you lost any weight without trying in the past 3 months?: No  Body mass index: (!) 25.24  Have you seen the dentist within the past year?: (!) No  Health Habits/Nutrition Interventions:  Dental exam overdue:  patient declines dental evaluation    Hearing/Vision:  Do you or your family notice any trouble with your hearing that hasn't been managed with hearing aids?: (!) Yes (GOING TO ENT PROVIDER)  Do you have difficulty driving, watching TV, or doing any of your daily activities because of your eyesight?: (!) Yes  Have you had an eye exam within the past year?: Yes  No results found.   Hearing/Vision Interventions:  Hearing concerns:  audiology referral provided  Vision concerns:  ophthalmology/optometry referral provided     ADLs:  In the past 7 days, did you need help from others to perform any of the following everyday activities: Eating, dressing, grooming, bathing, toileting, or walking/balance?: (!) Yes  Select all that apply: (!) Dressing, Walking/Balance, Toileting  In the past 7 days, did you need help from others to take care of any of the following: Laundry, housekeeping, banking/finances, shopping, telephone use, food preparation, transportation, or taking medications?: (!) Yes  Select all that apply: [employer], Housekeeping, Telephone Use, Food Preparation, Transportation, Taking Medications  ADL Interventions:  Patient declines any further evaluation/treatment for this issue  Her family members have been helping, she is willing to accept home care referral \" after Evaristo\". Her daughter says she continues to decline because they are changing her room and does not want people coming into her house          Objective   Vitals:    11/25/22 1539 11/25/22 1541   BP: (!) 142/68 (!) 140/62   Pulse: 62    Temp: 97.3 °F (36.3 °C)    SpO2: 91%    Weight: 125 lb (56.7 kg)    Height: 4' 11\" (1.499 m)       Body mass index is 25.25 kg/m².       General Appearance: alert and oriented to person, place and time, well-developed and well-nourished, in no acute distress and chronically ill looking patient  ENT: bilateral tympanic membrane normal and hearing abnormal-bilateral decreased hearing  Pulmonary/Chest: clear to auscultation bilaterally- no wheezes, rales or rhonchi, normal air movement, no respiratory distress  Cardiovascular: normal rate and irregularly irregular rhythm noted  Right upper chest port clean site  Abdomen: soft, non-tender, non-distended, normal bowel sounds, no masses or organomegaly  Extremities: no edema bilaterally, excessive scale noted    On the buttocks, at the upper end of the buttock crease, small scab skin lesion, 0.5 cm x 0.5 cm, no drainage, healing pressure ulcer      Allergies   Allergen Reactions    Latex Rash    Aleve [Naproxen Sodium]      Chronic kidney disease stage III, CHF    Apixaban      Severe GI bleed, needed blood transfusion      Aspirin      Severe GI bleeding, needed blood transfusions Naproxen      ESKD, CHF    Pioglitazone Other (See Comments)     Congestive heart failure    Claritin [Loratadine]     Keflex [Cephalexin]     Lisinopril      Needs clarification of contraindication     Prior to Visit Medications    Medication Sig Taking? Authorizing Provider   furosemide (LASIX) 80 MG tablet Take 80 mg by mouth every morning Water pill Yes Historical Provider, MD   tiotropium (SPIRIVA RESPIMAT) 2.5 MCG/ACT AERS inhaler Inhale 2 puffs into the lungs every morning (before breakfast) Yes David López MD   fluticasone-salmeterol (ADVAIR HFA) 115-21 MCG/ACT inhaler Inhale 2 puffs into the lungs 2 times daily Maintenance inhaler Yes Dagoberto Sauceda MD   omeprazole (PRILOSEC) 20 MG delayed release capsule Take 1 capsule by mouth every morning (before breakfast) Yes Dagoberto Sauceda MD   midodrine (PROAMATINE) 5 MG tablet Take 5 mg by mouth 2 times daily as needed For BP below 115/65. Take with you to dialysis Yes Historical Provider, MD   acetaminophen (TYLENOL) 325 MG tablet Take 650 mg by mouth every 6 hours as needed for Pain Yes Historical Provider, MD   hydrOXYzine HCl (ATARAX) 50 MG tablet Take 1 tablet by mouth as needed for Anxiety 1 tablet before dialysis Yes MAL Moreau CNP   Zinc Oxide 10 % OINT Apply BID Yes MAL Moreau CNP   Hydroactive Dressings (TEGADERM HYDROCOLLOID) MISC Apply 1 Units topically daily Yes MAL Moreau CNP   Misc. Devices (EGG CRATE BED PAD) MISC 1 Units by Does not apply route daily Yes Dagoberto Sauceda MD   ascorbic acid (VITAMIN C) 500 MG tablet Take 1 tablet by mouth in the morning and 1 tablet before bedtime.  Yes Nikolay Cummings,    amiodarone (PACERONE) 100 MG tablet Take 1 tablet by mouth daily Yes Emily Barclay MD   metoprolol tartrate (LOPRESSOR) 25 MG tablet Take 1 tablet by mouth 2 times daily Yes Dagoberto Sauceda MD   albuterol sulfate HFA (PROAIR HFA) 108 (90 Base) MCG/ACT inhaler Inhale 2 puffs into the lungs every 6 hours as needed for Wheezing or Shortness of Breath (cough) Yes Agustina Garcia MD   Blood Pressure KIT Diagnosis: HTN. Needs to check blood pressure 1-2 times a day until stable, then once a day. Goal blood pressure less than 135/85, and above 110/60. Yes Agustina Garcia MD   vitamin D (ERGOCALCIFEROL) 1.25 MG (40266 UT) CAPS capsule Take 1 capsule by mouth once a week Sundays Yes Agustina Garcia MD   Nebulizers (COMPRESSOR/NEBULIZER) MISC Nebulizer with compressor. Disposable Med Nebs 2 /month. Reusable Med Nebs 1 per 6 months. Aerosol Mask 1 per month. Replacement Filters 2 per month. All other related supplies as needed per month. Medications being used: Albuterol . 083 unit dose vial. Frequency: every 6 hrs x 4/day . Length of Need: 1 Yes Salima Tang MD   Handicap Placard MISC by Does not apply route Expires on 12/30/25 Yes Salima Tang MD   atorvastatin (LIPITOR) 40 MG tablet Take 1 tablet by mouth once daily Yes Agustina Garcia MD   desloratadine (CLARINEX) 5 MG tablet Take 1 tablet by mouth once daily Yes Agustina Garcia MD   magnesium oxide (MAG-OX) 400 (241.3 Mg) MG TABS tablet Take 1 tablet by mouth twice daily Yes Agustina Garcia MD   Multiple Vitamins-Minerals (THERAPEUTIC MULTIVITAMIN-MINERALS) tablet Take 1 tablet by mouth daily Yes Agustina Garcia MD   levothyroxine (SYNTHROID) 25 MCG tablet Take 1 tablet by mouth Daily  Patient not taking: Reported on 11/25/2022  Salima Tang MD   aspirin EC 81 MG EC tablet Take 1 tablet by mouth in the morning.   Patient not taking: Reported on 11/25/2022  Mika Garcia MD   apixaban (ELIQUIS) 5 MG TABS tablet Take 1 tablet by mouth 2 times daily  Shanique Lorenzana MD   amLODIPine (NORVASC) 10 MG tablet Take 1 tablet by mouth nightly  Xochitl Stein MD   Blood Pressure KIT 1 kit by Does not apply route three times daily  Patient not taking: Reported on 11/25/2022  Eloy Brower MD       [facility] (Including outside providers/suppliers regularly involved in providing care):   Patient Care Team:  Mary Knapp MD as PCP - General (Family Medicine)  Mary Knapp MD as PCP - [facility] Empaneled Provider  Darin Marks MD as Consulting Physician (Rheumatology)  Morgan Gaytan MD as Surgeon (Cardiology)  Santy Guido MD as Consulting Physician (Neurology)  Nelly Beatty MD as Consulting Physician (Nephrology)  Rickie Yuan MD as Consulting Physician (Urology)  Ainsley Ozuna MD as Surgeon (Ophthalmology)  Thu Hays MD as Consulting Physician (Gastroenterology)  Sandoval Ayers MD as Consulting Physician (Hematology and Oncology)     Reviewed and updated this visit:  Tobacco  Allergies  Meds  Med Hx  Surg Hx  Soc Hx  Fam Hx          Electronically signed by Mary Knapp MD on 11/25/22 at 8:43 PM EST

## 2022-11-25 NOTE — LETTER
[address] [place] [address]  Phone: [phone]  Fax: [phone]    Arabella Osuna MD        November 25, 2022     Patient: Mehran Rob   YOB: 1950   Date of Visit: 11/25/2022       To Whom It May Concern:    Sloane Velez  would benefit from high dosage flu shot and prevnar 20, she agreed to get them both at the dialysis due to fear of needles. If you have any questions or concerns, please don't hesitate to call.     Sincerely,        Arabella Osuna MD

## 2022-11-25 NOTE — PROGRESS NOTES
Visit Information    Have you changed or started any medications since your last visit including any over-the-counter medicines, vitamins, or herbal medicines? no   Have you stopped taking any of your medications? Is so, why? -  no  Are you having any side effects from any of your medications? - no    Have you seen any other physician or provider since your last visit? yes -    Have you had any other diagnostic tests since your last visit?  no   Have you been seen in the emergency room and/or had an admission in a hospital since we last saw you?  no   Have you had your routine dental cleaning in the past 6 months?  no     Do you have an active MyChart account? If no, what is the barrier?   Yes    Patient Care Team:  Hilario Bender MD as PCP - General (Family Medicine)  Hilario Bender MD as PCP - [facility]  Cally Kilpatrick MD as Consulting Physician (Rheumatology)  Daphne Cardoso MD as Surgeon (Cardiology)  Christian Reynolds MD as Consulting Physician (Neurology)  Preeti Serrato MD as Consulting Physician (Nephrology)  Nathalie Vences MD as Consulting Physician (Urology)  Dequan Bucio MD as Surgeon (Ophthalmology)  Fannie Bojorquez MD as Consulting Physician (Gastroenterology)  Arya Delacruz MD as Consulting Physician (Hematology and Oncology)    Medical History Review  Past Medical, Family, and Social History reviewed and does contribute to the patient presenting condition    Health Maintenance   Topic Date Due    COVID-19 Vaccine (1) Never done    Pneumococcal 65+ years Vaccine (1 - PCV) Never done    DTaP/Tdap/Td vaccine (1 - Tdap) Never done    Shingles vaccine (1 of 2) Never done    Hepatitis B vaccine (1 of 3 - Risk Dialysis 4-dose series) Never done    DEXA (modify frequency per FRAX score)  06/01/2017    Diabetic retinal exam  10/19/2021    Flu vaccine (1) Never done    Breast cancer screen  08/28/2022    Diabetic foot exam  09/22/2022    Annual Wellness Visit (AWV)  11/25/2022    Lipids  09/19/2023    A1C test (Diabetic or Prediabetic)  11/11/2023    Depression Screen  11/11/2023    Colorectal Cancer Screen  03/15/2027    Hepatitis C screen  Completed    Hepatitis A vaccine  Aged Out    Hib vaccine  Aged Out    Meningococcal (ACWY) vaccine  Aged Out

## 2022-11-25 NOTE — PATIENT INSTRUCTIONS
Personalized Preventive Plan for Emily Ojeda - 11/25/2022  Medicare offers a range of preventive health benefits. Some of the tests and screenings are paid in full while other may be subject to a deductible, co-insurance, and/or copay. Some of these benefits include a comprehensive review of your medical history including lifestyle, illnesses that may run in your family, and various assessments and screenings as appropriate. After reviewing your medical record and screening and assessments performed today your provider may have ordered immunizations, labs, imaging, and/or referrals for you. A list of these orders (if applicable) as well as your Preventive Care list are included within your After Visit Summary for your review. Other Preventive Recommendations:    A preventive eye exam performed by an eye specialist is recommended every 1-2 years to screen for glaucoma; cataracts, macular degeneration, and other eye disorders. A preventive dental visit is recommended every 6 months. Try to get at least 150 minutes of exercise per week or 10,000 steps per day on a pedometer . Order or download the FREE \"Exercise & Physical Activity: Your Everyday Guide\" from [employer]. Call 5-124.393.9315 or search [employer] online. You need 1513-5656 mg of calcium and 3142-2525 IU of vitamin D per day. It is possible to meet your calcium requirement with diet alone, but a vitamin D supplement is usually necessary to meet this goal.  When exposed to the sun, use a sunscreen that protects against both UVA and UVB radiation with an SPF of 30 or greater. Reapply every 2 to 3 hours or after sweating, drying off with a towel, or swimming. Always wear a seat belt when traveling in a car. Always wear a helmet when riding a bicycle or motorcycle. Heart-Healthy Diet   Sodium, Fat, and Cholesterol Controlled Diet       What Is a Heart Healthy Diet?    A heart-healthy diet is one that limits sodium , certain types of fat , and cholesterol . This type of diet is recommended for:   People with any form of cardiovascular disease (eg, coronary heart disease , peripheral vascular disease , previous heart attack , previous stroke )   People with risk factors for cardiovascular disease, such as high blood pressure , high cholesterol , or diabetes   Anyone who wants to lower their risk of developing cardiovascular disease   Sodium    Sodium is a mineral found in many foods. In general, most people consume much more sodium than they need. Diets high in sodium can increase blood pressure and lead to edema (water retention). On a heart-healthy diet, you should consume no more than 2,300 mg (milligrams) of sodium per dayabout the amount in one teaspoon of table salt. The foods highest in sodium include table salt (about 50% sodium), processed foods, convenience foods, and preserved foods. Cholesterol    Cholesterol is a fat-like, waxy substance in your blood. Our bodies make some cholesterol. It is also found in animal products, with the highest amounts in fatty meat, egg yolks, whole milk, cheese, shellfish, and organ meats. On a heart-healthy diet, you should limit your cholesterol intake to less than 200 mg per day. It is normal and important to have some cholesterol in your bloodstream. But too much cholesterol can cause plaque to build up within your arteries, which can eventually lead to a heart attack or stroke. The two types of cholesterol that are most commonly referred to are:   Low-density lipoprotein (LDL) cholesterol  Also known as bad cholesterol, this is the cholesterol that tends to build up along your arteries. Bad cholesterol levels are increased by eating fats that are saturated or hydrogenated. Optimal level of this cholesterol is less than 100. Over 130 starts to get risky for heart disease.    High-density lipoprotein (HDL) cholesterol  Also known as good cholesterol, this type of cholesterol actually carries cholesterol away from your arteries and may, therefore, help lower your risk of having a heart attack. You want this level to be high (ideally greater than 60). It is a risk to have a level less than 40. You can raise this good cholesterol by eating olive oil, canola oil, avocados, or nuts. Exercise raises this level, too. Fat    Fat is calorie dense and packs a lot of calories into a small amount of food. Even though fats should be limited due to their high calorie content, not all fats are bad. In fact, some fats are quite healthful. Fat can be broken down into four main types. The good-for-you fats are:   Monounsaturated fat  found in oils such as olive and canola, avocados, and nuts and natural nut butters; can decrease cholesterol levels, while keeping levels of HDL cholesterol high   Polyunsaturated fat  found in oils such as safflower, sunflower, soybean, corn, and sesame; can decrease total cholesterol and LDL cholesterol   Omega-3 fatty acids  particularly those found in fatty fish (such as salmon, trout, tuna, mackerel, herring, and sardines); can decrease risk of arrhythmias, decrease triglyceride levels, and slightly lower blood pressure   The fats that you want to limit are:   Saturated fat  found in animal products, many fast foods, and a few vegetables; increases total blood cholesterol, including LDL levels   Animal fats that are saturated include: butter, lard, whole-milk dairy products, meat fat, and poultry skin   Vegetable fats that are saturated include: hydrogenated shortening, palm oil, coconut oil, cocoa butter   Hydrogenated or trans fat  found in margarine and vegetable shortening, most shelf stable snack foods, and fried foods; increases LDL and decreases HDL     It is generally recommended that you limit your total fat for the day to less than 30% of your total calories.  If you follow an 1800-calorie heart healthy diet, for example, this would mean 60 grams of fat or less per day. Saturated fat and trans fat in your diet raises your blood cholesterol the most, much more than dietary cholesterol does. For this reason, on a heart-healthy diet, less than 7% of your calories should come from saturated fat and ideally 0% from trans fat. On an 1800-calorie diet, this translates into less than 14 grams of saturated fat per day, leaving 46 grams of fat to come from mono- and polyunsaturated fats.    Food Choices on a Heart Healthy Diet   Food Category   Foods Recommended   Foods to Avoid   Grains   Breads and rolls without salted tops Most dry and cooked cereals Unsalted crackers and breadsticks Low-sodium or homemade breadcrumbs or stuffing All rice and pastas   Breads, rolls, and crackers with salted tops High-fat baked goods (eg, muffins, donuts, pastries) Quick breads, self-rising flour, and biscuit mixes Regular bread crumbs Instant hot cereals Commercially prepared rice, pasta, or stuffing mixes   Vegetables   Most fresh, frozen, and low-sodium canned vegetables Low-sodium and salt-free vegetable juices Canned vegetables if unsalted or rinsed   Regular canned vegetables and juices, including sauerkraut and pickled vegetables Frozen vegetables with sauces Commercially prepared potato and vegetable mixes   Fruits   Most fresh, frozen, and canned fruits All fruit juices   Fruits processed with salt or sodium   Milk   Nonfat or low-fat (1%) milk Nonfat or low-fat yogurt Cottage cheese, low-fat ricotta, cheeses labeled as low-fat and low-sodium   Whole milk Reduced-fat (2%) milk Malted and chocolate milk Full fat yogurt Most cheeses (unless low-fat and low salt) Buttermilk (no more than 1 cup per week)   Meats and Beans   Lean cuts of fresh or frozen beef, veal, lamb, or pork (look for the word loin) Fresh or frozen poultry without the skin Fresh or frozen fish and some shellfish Egg whites and egg substitutes (Limit whole eggs to three per week) Tofu Nuts or seeds (unsalted, dry-roasted), low-sodium peanut butter Dried peas, beans, and lentils   Any smoked, cured, salted, or canned meat, fish, or poultry (including de león, chipped beef, cold cuts, hot dogs, sausages, sardines, and anchovies) Poultry skins Breaded and/or fried fish or meats Canned peas, beans, and lentils Salted nuts   Fats and Oils   Olive oil and canola oil Low-sodium, low-fat salad dressings and mayonnaise   Butter, margarine, coconut and palm oils, de león fat   Snacks, Sweets, and Condiments   Low-sodium or unsalted versions of broths, soups, soy sauce, and condiments Pepper, herbs, and spices; vinegar, lemon, or lime juice Low-fat frozen desserts (yogurt, sherbet, fruit bars) Sugar, cocoa powder, honey, syrup, jam, and preserves Low-fat, trans-fat free cookies, cakes, and pies Salvatore and animal crackers, fig bars, yuriy snaps   High-fat desserts Broth, soups, gravies, and sauces, made from instant mixes or other high-sodium ingredients Salted snack foods Canned olives Meat tenderizers, seasoning salt, and most flavored vinegars   Beverages   Low-sodium carbonated beverages Tea and coffee in moderation Soy milk   Commercially softened water   Suggestions   Make whole grains, fruits, and vegetables the base of your diet. Choose heart-healthy fats such as canola, olive, and flaxseed oil, and foods high in heart-healthy fats, such as nuts, seeds, soybeans, tofu, and fish. Eat fish at least twice per week; the fish highest in omega-3 fatty acids and lowest in mercury include salmon, herring, mackerel, sardines, and canned chunk light tuna. If you eat fish less than twice per week or have high triglycerides, talk to your doctor about taking fish oil supplements. Read food labels. For products low in fat and cholesterol, look for fat free, low-fat, cholesterol free, saturated fat free, and trans fat freeAlso scan the Nutrition Facts Label, which lists saturated fat, trans fat, and cholesterol amounts. For products low in sodium, look for sodium free, very low sodium, low sodium, no added salt, and unsalted   Skip the salt when cooking or at the table; if food needs more flavor, get creative and try out different herbs and spices. Garlic and onion also add substantial flavor to foods. Trim any visible fat off meat and poultry before cooking, and drain the fat off after voss. Use cooking methods that require little or no added fat, such as grilling, boiling, baking, poaching, broiling, roasting, steaming, stir-frying, and sauting. Avoid fast food and convenience food. They tend to be high in saturated and trans fat and have a lot of added salt. Talk to a registered dietitian for individualized diet advice. Last Reviewed: March 2011 Dmitriy Alvarado MS, MPH, RD   Updated: 3/29/2011     Heart-Healthy Diet   Sodium, Fat, and Cholesterol Controlled Diet       What Is a Heart Healthy Diet? A heart-healthy diet is one that limits sodium , certain types of fat , and cholesterol . This type of diet is recommended for:   People with any form of cardiovascular disease (eg, coronary heart disease , peripheral vascular disease , previous heart attack , previous stroke )   People with risk factors for cardiovascular disease, such as high blood pressure , high cholesterol , or diabetes   Anyone who wants to lower their risk of developing cardiovascular disease   Sodium    Sodium is a mineral found in many foods. In general, most people consume much more sodium than they need. Diets high in sodium can increase blood pressure and lead to edema (water retention). On a heart-healthy diet, you should consume no more than 2,300 mg (milligrams) of sodium per dayabout the amount in one teaspoon of table salt. The foods highest in sodium include table salt (about 50% sodium), processed foods, convenience foods, and preserved foods. Cholesterol    Cholesterol is a fat-like, waxy substance in your blood.  Our bodies make some cholesterol. It is also found in animal products, with the highest amounts in fatty meat, egg yolks, whole milk, cheese, shellfish, and organ meats. On a heart-healthy diet, you should limit your cholesterol intake to less than 200 mg per day. It is normal and important to have some cholesterol in your bloodstream. But too much cholesterol can cause plaque to build up within your arteries, which can eventually lead to a heart attack or stroke. The two types of cholesterol that are most commonly referred to are:   Low-density lipoprotein (LDL) cholesterol  Also known as bad cholesterol, this is the cholesterol that tends to build up along your arteries. Bad cholesterol levels are increased by eating fats that are saturated or hydrogenated. Optimal level of this cholesterol is less than 100. Over 130 starts to get risky for heart disease. High-density lipoprotein (HDL) cholesterol  Also known as good cholesterol, this type of cholesterol actually carries cholesterol away from your arteries and may, therefore, help lower your risk of having a heart attack. You want this level to be high (ideally greater than 60). It is a risk to have a level less than 40. You can raise this good cholesterol by eating olive oil, canola oil, avocados, or nuts. Exercise raises this level, too. Fat    Fat is calorie dense and packs a lot of calories into a small amount of food. Even though fats should be limited due to their high calorie content, not all fats are bad. In fact, some fats are quite healthful. Fat can be broken down into four main types.    The good-for-you fats are:   Monounsaturated fat  found in oils such as olive and canola, avocados, and nuts and natural nut butters; can decrease cholesterol levels, while keeping levels of HDL cholesterol high   Polyunsaturated fat  found in oils such as safflower, sunflower, soybean, corn, and sesame; can decrease total cholesterol and LDL cholesterol   Omega-3 fatty acids  particularly those found in fatty fish (such as salmon, trout, tuna, mackerel, herring, and sardines); can decrease risk of arrhythmias, decrease triglyceride levels, and slightly lower blood pressure   The fats that you want to limit are:   Saturated fat  found in animal products, many fast foods, and a few vegetables; increases total blood cholesterol, including LDL levels   Animal fats that are saturated include: butter, lard, whole-milk dairy products, meat fat, and poultry skin   Vegetable fats that are saturated include: hydrogenated shortening, palm oil, coconut oil, cocoa butter   Hydrogenated or trans fat  found in margarine and vegetable shortening, most shelf stable snack foods, and fried foods; increases LDL and decreases HDL     It is generally recommended that you limit your total fat for the day to less than 30% of your total calories. If you follow an 1800-calorie heart healthy diet, for example, this would mean 60 grams of fat or less per day. Saturated fat and trans fat in your diet raises your blood cholesterol the most, much more than dietary cholesterol does. For this reason, on a heart-healthy diet, less than 7% of your calories should come from saturated fat and ideally 0% from trans fat. On an 1800-calorie diet, this translates into less than 14 grams of saturated fat per day, leaving 46 grams of fat to come from mono- and polyunsaturated fats.    Food Choices on a Heart Healthy Diet   Food Category   Foods Recommended   Foods to Avoid   Grains   Breads and rolls without salted tops Most dry and cooked cereals Unsalted crackers and breadsticks Low-sodium or homemade breadcrumbs or stuffing All rice and pastas   Breads, rolls, and crackers with salted tops High-fat baked goods (eg, muffins, donuts, pastries) Quick breads, self-rising flour, and biscuit mixes Regular bread crumbs Instant hot cereals Commercially prepared rice, pasta, or stuffing mixes   Vegetables   Most fresh, frozen, and low-sodium canned vegetables Low-sodium and salt-free vegetable juices Canned vegetables if unsalted or rinsed   Regular canned vegetables and juices, including sauerkraut and pickled vegetables Frozen vegetables with sauces Commercially prepared potato and vegetable mixes   Fruits   Most fresh, frozen, and canned fruits All fruit juices   Fruits processed with salt or sodium   Milk   Nonfat or low-fat (1%) milk Nonfat or low-fat yogurt Cottage cheese, low-fat ricotta, cheeses labeled as low-fat and low-sodium   Whole milk Reduced-fat (2%) milk Malted and chocolate milk Full fat yogurt Most cheeses (unless low-fat and low salt) Buttermilk (no more than 1 cup per week)   Meats and Beans   Lean cuts of fresh or frozen beef, veal, lamb, or pork (look for the word loin) Fresh or frozen poultry without the skin Fresh or frozen fish and some shellfish Egg whites and egg substitutes (Limit whole eggs to three per week) Tofu Nuts or seeds (unsalted, dry-roasted), low-sodium peanut butter Dried peas, beans, and lentils   Any smoked, cured, salted, or canned meat, fish, or poultry (including de león, chipped beef, cold cuts, hot dogs, sausages, sardines, and anchovies) Poultry skins Breaded and/or fried fish or meats Canned peas, beans, and lentils Salted nuts   Fats and Oils   Olive oil and canola oil Low-sodium, low-fat salad dressings and mayonnaise   Butter, margarine, coconut and palm oils, de león fat   Snacks, Sweets, and Condiments   Low-sodium or unsalted versions of broths, soups, soy sauce, and condiments Pepper, herbs, and spices; vinegar, lemon, or lime juice Low-fat frozen desserts (yogurt, sherbet, fruit bars) Sugar, cocoa powder, honey, syrup, jam, and preserves Low-fat, trans-fat free cookies, cakes, and pies Salvatore and animal crackers, fig bars, yuriy snaps   High-fat desserts Broth, soups, gravies, and sauces, made from instant mixes or other high-sodium ingredients Salted snack foods Canned olives Meat tenderizers, seasoning salt, and most flavored vinegars   Beverages   Low-sodium carbonated beverages Tea and coffee in moderation Soy milk   Commercially softened water   Suggestions   Make whole grains, fruits, and vegetables the base of your diet. Choose heart-healthy fats such as canola, olive, and flaxseed oil, and foods high in heart-healthy fats, such as nuts, seeds, soybeans, tofu, and fish. Eat fish at least twice per week; the fish highest in omega-3 fatty acids and lowest in mercury include salmon, herring, mackerel, sardines, and canned chunk light tuna. If you eat fish less than twice per week or have high triglycerides, talk to your doctor about taking fish oil supplements. Read food labels. For products low in fat and cholesterol, look for fat free, low-fat, cholesterol free, saturated fat free, and trans fat freeAlso scan the Nutrition Facts Label, which lists saturated fat, trans fat, and cholesterol amounts. For products low in sodium, look for sodium free, very low sodium, low sodium, no added salt, and unsalted   Skip the salt when cooking or at the table; if food needs more flavor, get creative and try out different herbs and spices. Garlic and onion also add substantial flavor to foods. Trim any visible fat off meat and poultry before cooking, and drain the fat off after voss. Use cooking methods that require little or no added fat, such as grilling, boiling, baking, poaching, broiling, roasting, steaming, stir-frying, and sauting. Avoid fast food and convenience food. They tend to be high in saturated and trans fat and have a lot of added salt. Talk to a registered dietitian for individualized diet advice. Last Reviewed: March 2011 Amanda Barron MS, MPH, RD   Updated: 3/29/2011     High-Fiber Diet     What Is Fiber? Dietary fiber is a form of carbohydrate found in plants that cannot be digested by humans.  All plants contain fiber, including fruits, vegetables, grains, and legumes. Fiber is often classified into two categories: soluble and insoluble. Soluble fiber draws water into the bowel and can help slow digestion. Examples of foods that are high in soluble fiber include oatmeal, oat bran, barley, legumes (eg, beans and peas), apples, and strawberries. Insoluble fiber speeds digestion and can add bulk to the stool. Examples of foods that are high in insoluble fiber include whole-wheat products, wheat bran, cauliflower, green beans, and potatoes. Why Follow a High-Fiber Diet? A high-fiber diet is often recommended to prevent and treat constipation , hemorrhoids , diverticulitis , and irritable bowel syndrome . Eating a high-fiber diet can also help improve your cholesterol levels, lower your risk of coronary heart disease , reduce your risk of type 2 diabetes , and lower your weight. For people with type 1 or 2 diabetes, a high-fiber diet can also help stabilize blood sugar levels. How Much Fiber Should I Eat? A high-fiber diet should contain  20-35 grams  of fiber a day. This is actually the amount recommended for the general adult population; however, most Americans eat only 15 grams of fiber per day. Digestion of Fiber   Eating a higher fiber diet than usual can take some getting used to by your body's digestive system. To avoid the side effects of sudden increases in dietary fiber (eg, gas, cramping, bloating, and diarrhea), increase fiber gradually and be sure to drink plenty of fluids every day. Tips for Increasing Fiber Intake   Whenever possible, choose whole grains over refined grains (eg, brown rice instead of white rice, whole-wheat bread instead of white bread). Include a variety of grains in your diet, such as wheat, rye, barley, oats, quinoa, and bulgur. Eat more vegetarian-based meals. Here are some ideas: black bean burgers, eggplant lasagna, and veggie tofu stir-walker.     Choose high-fiber snacks, such as fruits, popcorn, whole-grain crackers, and nuts. Make whole-grain cereal or whole-grain toast part of your daily breakfast regime. When eating out, whether ordering a sandwich or dinner, ask for extra vegetables. When baking, replace part of the white flour with whole-wheat flour. Whole-wheat flour is particularly easy to incorporate into a recipe. High-Fiber Diet Eating Guide   Food Category   Foods Recommended   Notes   Grains   Whole-grain breads, muffins, bagels, or kimberley bread Rye bread Whole-wheat crackers or crisp breads Whole-grain or bran cereals Oatmeal, oat bran, or grits Wheat germ Whole-wheat pasta and brown rice   Read the ingredients list on food labels. Look for products that list \"whole\" as the first ingredient (eg, whole-wheat, whole oats). Choose cereals with at least 2 grams of fiber per serving. Vegetables   All vegetables, especially asparagus, bean sprouts, broccoli, [place] sprouts, cabbage, carrots, cauliflower, celery, corn, greens, green beans, green pepper, onions, peas, potatoes (with skin), snow peas, spinach, squash, sweet potatoes, tomatoes, zucchini   For maximum fiber intake, eat the peels of fruits and vegetablesjust be sure to wash them well first.   Fruits   All fruits, especially apples, berries, grapefruits, mangoes, nectarines, oranges, peaches, pears, dried fruits (figs, dates, prunes, raisins)   Choose raw fruits and vegetables over juice, cooked, or cannedraw fruit has more fiber. Dried fruit is also a good source of fiber. Milk   With the exception of yogurt containing inulin (a type of fiber), dairy foods provide little fiber. Add more fiber by topping your yogurt or cottage cheese with fresh fruit, whole grain or bran cereals, nuts, or seeds.    Meats and Beans   All beans and peas, especially Garbanzo beans, kidney beans, lentils, lima beans, split peas, and merchant beans All nuts and seeds, especially almonds, peanuts, [place] nuts, cashews, peanut butter, walnuts, sesame and sunflower seeds All meat, poultry, fish, and eggs   Increase fiber in meat dishes by adding merchant beans, kidney beans, black-eyed peas, bran, or oatmeal. If you are following a low-fat diet, use nuts and seeds only in moderation. Fats and Oils   All in moderation   Fats and oils do not provide fiber   Snacks, Sweets, and Condiments   Fruit Nuts Popcorn, whole-wheat pretzels, or trail mix made with dried fruits, nuts, and seeds Cakes, breads, and cookies made with oatmeal or whole-wheat flour   Most snack foods do not provide much fiber. Choose snacks with at least 2 grams of fiber per serving. Last Reviewed: March 2011 Austyn Marquez MS, MPH, RD   Updated: 3/29/2011     Keep Your Memory Yusuf Lopez       Many factors can affect your ability to remembera hectic lifestyle, aging, stress, chronic disease, and certain medicines. But, there are steps you can take to sharpen your mind and help preserve your memory. Challenge Your Brain   Regularly challenging your mind may help keeps it in top shape. Good mental exercises include:   Crossword puzzlesUse a dictionary if you need it; you will learn more that way. Brainteasers Try some! Crafts, such as wood working and sewing   Hobbies, such as gardening and building model airplanes   SocializingVisit old friends or join groups to meet new ones. Reading   Learning a new language   Taking a class, whether it be art history or yvette chi   TravelingExperience the food, history, and culture of your destination   Learning to use a computer   Going to museums, the theater, or thought-provoking movies   Changing things in your daily life, such as reversing your pattern in the grocery store or brushing your teeth using your nondominant hand   Use Memory Aids   There is no need to remember every detail on your own.  These memory aids can help:   Calendars and day planners   Electronic organizers to store all sorts of helpful informationThese devices can \"beep\" to remind you of appointments. A book of days to record birthdays, anniversaries, and other occasions that occur on the same date every year   Detailed \"to-do\" lists and strategically placed sticky notes   Quick \"study\" sessionsBefore a gathering, review who will be there so their names will be fresh in your mind. Establish routinesFor example, keep your keys, wallet, and umbrella in the same place all the time or take medicine with your 8:00 AM glass of juice   Live a Healthy Life   Many actions that will keep your body strong will do the same for your mind. For example:   Talk to Your Doctor About Herbs and Supplements    Malnutrition and vitamin deficiencies can impair your mental function. For example, vitamin B12 deficiency can cause a range of symptoms, including confusion. But, what if your nutritional needs are being met? Can herbs and supplements still offer a benefit? Researchers have investigated a range of natural remedies, such as ginkgo , ginseng , and the supplement phosphatidylserine (PS). So far, though, the evidence is inconsistent as to whether these products can improve memory or thinking. If you are interested in taking herbs and supplements, talk to your doctor first because they may interact with other medicines that you are taking. Exercise Regularly    Among the many benefits of regular exercise are increased blood flow to the brain and decreased risk of certain diseases that can interfere with memory function. One study found that even moderate exercise has a beneficial effect. Examples of \"moderate\" exercise include:   Playing 18 holes of golf once a week, without a cart   Playing tennis twice a week   Walking one mile per day   Manage Stress    It can be tough to remember what is important when your mind is cluttered. Make time for relaxation. Choose activities that calm you down, and make it routine.    Manage Chronic Conditions    Side effects of high blood pressure , diabetes, and heart disease can interfere with mental function. Many of the lifestyle steps discussed here can help manage these conditions. Strive to eat a healthy diet, exercise regularly, get stress under control, and follow your doctor's advice for your condition. Minimize Medications    Talk to your doctor about the medicines that you take. Some may be unnecessary. Also, healthy lifestyle habits may lower the need for certain drugs. Last Reviewed: April 2010 Eric Mcdowell MD   Updated: 4/13/2010     Keeping Home a [address]       As we get older, changes in balance, gait, strength, vision, hearing, and cognition make even the most youthful senior more prone to accidents. Falls are one of the leading health risks for older people. This increased risk of falling is related to:   Aging process (eg, decreased muscle strength, slowed reflexes)   Higher incidence of chronic health problems (eg, arthritis, diabetes) that may limit mobility, agility or sensory awareness   Side effects of medicine (eg, dizziness, blurred vision)especially medicines like prescription pain medicines and drugs used to treat mental health conditions   Depending on the brittleness of your bones, the consequences of a fall can be serious and long lasting. Home Life   Research by the Association of Aging [facility]) shows that some home accidents among older adults can be prevented by making simple lifestyle changes and basic modifications and repairs to the home environment. Here are some lifestyle changes that experts recommend:   Have your hearing and vision checked regularly. Be sure to wear prescription glasses that are right for you. Speak to your doctor or pharmacist about the possible side effects of your medicines. A number of medicines can cause dizziness. If you have problems with sleep, talk to your doctor. Limit your intake of alcohol. If necessary, use a cane or walker to help maintain your balance.    Wear supportive, rubber-soled shoes, even at home. If you live in a region that gets wintry weather, you may want to put special cleats on your shoes to prevent you from slipping on the snow and ice. Exercise regularly to help maintain muscle tone, agility, and balance. Always hold the banister when going up or down stairs. Also, use  bars when getting in or out of the bath or shower, or using the toilet. To avoid dizziness, get up slowly from a lying down position. Sit up first, dangling your legs for a minute or two before rising to a standing position. Overall Home Safety Check   According to the Consumer Product Safety Commision's \"Older Consumer Home Safety Checklist,\" it is important to check for potential hazards in each room. And remember, proper lighting is an essential factor in home safety. If you cannot see clearly, you are more likely to fall. Important questions to ask yourself include:   Are lamp, electric, extension, and telephone cords placed out of the flow of traffic and maintained in good condition? Have frayed cords been replaced? Are all small rugs and runners slip resistant? If not, you can secure them to the floor with a special double-sided carpet tape. Are smoke detectors properly locatedone on every floor of your home and one outside of every sleeping area? Are they in good working order? Are batteries replaced at least once a year? Do you have a well-maintained carbon monoxide detector outside every sleeping are in your home? Does your furniture layout leave plenty of space to maneuver between and around chairs, tables, beds, and sofas? Are hallways, stairs and passages between rooms well lit? Can you reach a lamp without getting out of bed? Are floor surfaces well maintained? Shag rugs, high-pile carpeting, tile floors, and polished wood floors can be particularly slippery. Stairs should always have handrails and be carpeted or fitted with a non-skid tread. Is your telephone easily reachable.  Is the cord safely tucked away? Room by Room   According to the Association of Aging, bathrooms and donnie are the two most potentially hazardous rooms in your home. In the Kitchen    Be sure your stove is in proper working order and always make sure burners and the oven are off before you go out or go to sleep. Keep pots on the back burners, turn handles away from the front of the stove, and keep stove clean and free of grease build-up. Kitchen ventilation systems and range exhausts should be working properly. Keep flammable objects such as towels and pot holders away from the cooking area except when in use. Make sure kitchen curtains are tied back. Move cords and appliances away from the sink and hot surfaces. If extension cords are needed, install wiring guides so they do not hang over the sink, range, or working areas. Look for coffee pots, kettles and toaster ovens with automatic shut-offs. Keep a mop handy in the kitchen so you can wipe up spills instantly. You should also have a small fire extinguisher. Arrange your kitchen with frequently used items on lower shelves to avoid the need to stand on a stepstool to reach them. Make sure countertops are well-lit to avoid injuries while cutting and preparing food. In the Bathroom    Use a non-slip mat or decals in the tub and shower, since wet, soapy tile or porcelain surfaces are extremely slippery. Make sure bathroom rugs are non-skid or tape them firmly to the floor. Bathtubs should have at least one, preferably two, grab bars, firmly attached to structural supports in the wall. (Do not use built-in soap holders or glass shower doors as grab bars.)    Tub seats fitted with non-slip material on the legs allow you to wash sitting down. For people with limited mobility, bathtub transfer benches allow you to slide safely into the tub. Raised toilet seats and toilet safety rails are helpful for those with knee or hip problems.     In the Bedroom    Make sure you use a nightlight and that the area around your bed is clear of potential obstacles. Be careful with electric blankets and never go to sleep with a heating pad, which can cause serious burns even if on a low setting. Use fire-resistant mattress covers and pillows, and NEVER smoke in bed. Keep a phone next to the bed that is programmed to dial 911 at the push of a button. If you have a chronic condition, you may want to sign on with an automatic call-in service. Typically the system includes a small pendant that connects directly to an emergency medical voice-response system. You should also make arrangements to stay in contact with someonefriend, neighbor, family memberon a regular schedule. Fire Prevention   According to the [employer]. (Smoke Alarms for Every) [address] [place] [address], senior citizens are one of the two highest risk groups for death and serious injuries due to residential fires. When cooking, wear short-sleeved items, never a bulky long-sleeved robe. The [facility]'s Safety Checklist for Older Consumers emphasizes the importance of checking basements, garages, workshops and storage areas for fire hazards, such as volatile liquids, piles of old rags or clothing and overloaded circuits. Never smoke in bed or when lying down on a couch or recliner chair. Small portable electric or kerosene heaters are responsible for many home fires and should be used cautiously if at all. If you do use one, be sure to keep them away from flammable materials. In case of fire, make sure you have a pre-established emergency exit plan. Have a professional check your fireplace and other fuel-burning appliances yearly. Helping Hands   Baby boomers entering the priest years will continue to see the development of new products to help older adults live safely and independently in spite of age-related changes.   Making Life More Livable  , by [employer], lists over 1,000 products for \"living well in the mature years,\" such as bathing and mobility aids, household security devices, ergonomically designed knives and peelers, and faucet valves and knobs for temperature control. Medical supply stores and organizations are good sources of information about products that improve your quality of life and insure your safety.      Last Reviewed: November 2009 Stephanie Turcios MD   Updated: 3/7/2011

## 2022-11-26 NOTE — TELEPHONE ENCOUNTER
Dr. Brain Schwarz,    Just letting you know I have seen patient today, I convinced her to discuss with you regarding AV fistula and I told the daughter, Erin Daily, to ask for referral to vascular surgeon tomorrow at the dialysis or next week whenever he would be present. I also convinced the patient regarding receiving immunizations through the dialysis center with lidocaine application if possible in order for her not to hurt, for influenza vaccine high dosage, Prevnar 20 and COVID-19, if possible please  It is the first time she reports to me she is extremely afraid of needles, and is the first time she finally agrees for some vaccinations    I also stopped the vitamin D, and I advised her and her daughter to discuss a few the multivitamin she wants to start, I explained to her she needs to bring the medication bottles when she comes to be dialysis center for you or your staff to see it. Thank you!   Tip Shekih

## 2022-11-28 DIAGNOSIS — I12.0 HYPERTENSIVE CKD, ESRD ON DIALYSIS (HCC): ICD-10-CM

## 2022-11-28 DIAGNOSIS — J30.89 ALLERGIC RHINITIS DUE TO OTHER ALLERGIC TRIGGER, UNSPECIFIED SEASONALITY: ICD-10-CM

## 2022-11-28 DIAGNOSIS — N18.6 HYPERTENSIVE CKD, ESRD ON DIALYSIS (HCC): ICD-10-CM

## 2022-11-28 DIAGNOSIS — Z99.2 HYPERTENSIVE CKD, ESRD ON DIALYSIS (HCC): ICD-10-CM

## 2022-11-28 RX ORDER — DESLORATADINE 5 MG/1
TABLET ORAL
Qty: 90 TABLET | Refills: 3 | Status: SHIPPED | OUTPATIENT
Start: 2022-11-28

## 2022-11-28 RX ORDER — FUROSEMIDE 80 MG
80 TABLET ORAL EVERY MORNING
Qty: 90 TABLET | Refills: 3 | Status: SHIPPED | OUTPATIENT
Start: 2022-11-28

## 2022-11-28 NOTE — TELEPHONE ENCOUNTER
Patient daughter called and would like refills sent to local pharmacy. Please Approve or Refuse.   Send to Pharmacy per Pt's Request: [facility]      Next Visit Date:  2/17/2023   Last Visit Date: 11/25/2022    Hemoglobin A1C (%)   Date Value   11/11/2022 6.4   03/31/2022 4.6   11/24/2021 5.8             ( goal A1C is < 7)   BP Readings from Last 3 Encounters:   11/25/22 (!) 140/62   11/11/22 112/70   09/28/22 (!) 146/75          (goal 120/80)  BUN   Date Value Ref Range Status   10/18/2022 33 (H) 8 - 23 mg/dL Final     Creatinine   Date Value Ref Range Status   10/18/2022 3.51 (H) 0.50 - 0.90 mg/dL Final     Potassium   Date Value Ref Range Status   10/18/2022 5.6 (H) 3.7 - 5.3 mmol/L Final

## 2022-11-29 NOTE — TELEPHONE ENCOUNTER
Noted, I will print and will fax this note to the nephrologist  Future Appointments   Date Time Provider Angela Reyez   2/17/2023  2:00 PM Radha Juárez MD [facility]   12/1/2023  3:30 PM Radha Juárez MD [facility]

## 2022-11-30 ENCOUNTER — TELEPHONE (OUTPATIENT)
Dept: FAMILY MEDICINE CLINIC | Age: 72
End: 2022-11-30

## 2022-11-30 NOTE — TELEPHONE ENCOUNTER
metoprolol succinate (TOPROL XL) 25 MG extended release tablet   midodrine (PROAMATINE) 5 MG tablet     Pts daughter called and needs clarification on how pt is to take BP medicine metoprolol and the midodrine if pts BP the top number is higher than 115 but the bottom number is lower than the 65. While at dialysis pts BP dropped and midodrine was given and the nurse were inquiring how soon a second dose could be given if very little or no change occurred. If the top or bottom number is a little off she isn't sure which medication she should give. So if the top number is good and the bottom number is off which medication should she give? Or if the bottom number is good and the top number is off how does she determine which medication to give.      Tricia Maynard - [phone]

## 2022-11-30 NOTE — TELEPHONE ENCOUNTER
midodrine (PROAMATINE) 5 MG tablet Take 1 tablet by mouth 2 times daily as needed (if low BP below 115/65, take to dialysis) 90 tablet 3     metoprolol succinate (TOPROL XL) 25 MG extended release tablet Take 1 tablet by mouth every evening Hold if BP bellow 115/65. Stop metoprolol tartrate 90 tablet 3         Metoprolol XL 25 mg to be hold off 1 day only if both numbers are below 115/65.   If only 1 number is below okay to get the metoprolol    If the blood pressure stays low below 115/65 both numbers low get midodrine  After dialysis, to get another midodrine 5 mg after 30 minutes if both numbers are still low, and repeat the blood pressure  At dialysis, if the blood pressure persistently stays low after 2 rounds of midodrine, they need to contact the nephrologist for instructions                Regarding the midodrine              Future Appointments   Date Time Provider Angela Reyez   [facility]17[facility]2023  [facility]   12/1/2023  3:30 PM Windy Pham MD [facility]

## 2022-12-07 ENCOUNTER — TELEPHONE (OUTPATIENT)
Dept: FAMILY MEDICINE CLINIC | Age: 72
End: 2022-12-07

## 2022-12-07 NOTE — TELEPHONE ENCOUNTER
Noted. Thank you!     Future Appointments   Date Time Provider Angela Aissatou   2/17/2023  2:00 PM Anthony Morrell MD [email or web address]   12/1/2023  3:30 PM Anthony Morrell MD [facility]

## 2022-12-07 NOTE — TELEPHONE ENCOUNTER
[facility] called and stated they do not use lidocaine prior to vaccines and did let the patient know that and also wanted to inform you as well

## 2022-12-21 NOTE — TELEPHONE ENCOUNTER
To write down the instructions    When the blood pressure is low, below 115/65, both numbers, needs to take midodrine 5 mg    I would suggest to cut down furosemide from 80 mg to 40 mg, please let me know if she agrees, then I will make the changes in the computer, also needs to let nephrologist know    Did she get the high dosage flu shot?

## 2022-12-21 NOTE — TELEPHONE ENCOUNTER
Patient daughter called stated that her mother bp has been running very low for a few weeks now. She is concerned as when she goes to dialysis mother blood pressure is dropping very low around 114/58 , yesterday she was unable to leave until it raised up to atleast 105 top number, forgot the bottom number. She stated that mother is taking medication as prescribed, she will like to know if it should be adjusted? Due to low blood pressure readings please advise.

## 2022-12-21 NOTE — TELEPHONE ENCOUNTER
GAVE ALL INSTRUCTIONS/PT DAUGHTER DID WRITE DOWN/STATES WILL LET US KNOW ABOUT THE LASIX/FUROSAMIDE AFTER SHE CALLS NEPHROLOGIST

## 2023-01-01 ENCOUNTER — CLINICAL DOCUMENTATION ONLY (OUTPATIENT)
Facility: CLINIC | Age: 73
End: 2023-01-01

## 2023-01-01 ENCOUNTER — NURSE ONLY (OUTPATIENT)
Dept: LAB | Age: 73
End: 2023-01-01

## 2023-01-04 DIAGNOSIS — L89.301 PRESSURE INJURY OF BUTTOCK, STAGE 1, UNSPECIFIED LATERALITY: ICD-10-CM

## 2023-01-04 DIAGNOSIS — L08.9 SKIN INFECTION: Primary | ICD-10-CM

## 2023-01-04 NOTE — TELEPHONE ENCOUNTER
Patient daughter called to request this ointment be refilled would also like it sent in as a 90 day to her mail order

## 2023-01-17 DIAGNOSIS — J44.9 CHRONIC OBSTRUCTIVE PULMONARY DISEASE, UNSPECIFIED COPD TYPE (HCC): ICD-10-CM

## 2023-01-17 NOTE — TELEPHONE ENCOUNTER
Please Approve or Refuse.   Send to Pharmacy per Pt's Request: [facility] [phone]     Next Visit Date:  1/27/2023   Last Visit Date: 11/25/2022    Hemoglobin A1C (%)   Date Value   11/11/2022 6.4   03/31/2022 4.6   11/24/2021 5.8             ( goal A1C is < 7)   BP Readings from Last 3 Encounters:   11/25/22 (!) 140/62   11/11/22 112/70   09/28/22 (!) 146/75          (goal 120/80)  BUN   Date Value Ref Range Status   10/18/2022 33 (H) 8 - 23 mg/dL Final     Creatinine   Date Value Ref Range Status   10/18/2022 3.51 (H) 0.50 - 0.90 mg/dL Final     Potassium   Date Value Ref Range Status   10/18/2022 5.6 (H) 3.7 - 5.3 mmol/L Final

## 2023-01-27 ENCOUNTER — OFFICE VISIT (OUTPATIENT)
Dept: FAMILY MEDICINE CLINIC | Age: 73
End: 2023-01-27
Payer: MEDICARE

## 2023-01-27 DIAGNOSIS — F41.9 MODERATE ANXIETY: ICD-10-CM

## 2023-01-27 DIAGNOSIS — E44.1 MILD MALNUTRITION (HCC): ICD-10-CM

## 2023-01-27 DIAGNOSIS — Z99.2 HYPERTENSIVE CKD, ESRD ON DIALYSIS (HCC): ICD-10-CM

## 2023-01-27 DIAGNOSIS — I48.0 PAROXYSMAL ATRIAL FIBRILLATION (HCC): ICD-10-CM

## 2023-01-27 DIAGNOSIS — I73.9 PERIPHERAL VASCULAR DISEASE, UNSPECIFIED (HCC): ICD-10-CM

## 2023-01-27 DIAGNOSIS — E78.5 HYPERLIPIDEMIA WITH TARGET LDL LESS THAN 70: ICD-10-CM

## 2023-01-27 DIAGNOSIS — I13.2 HYPERTENSIVE HEART AND CHRONIC KIDNEY DISEASE WITH HEART FAILURE AND WITH STAGE 5 CHRONIC KIDNEY DISEASE, OR END STAGE RENAL DISEASE (HCC): ICD-10-CM

## 2023-01-27 DIAGNOSIS — N18.6 TYPE 2 DIABETES MELLITUS WITH CHRONIC KIDNEY DISEASE ON CHRONIC DIALYSIS, WITHOUT LONG-TERM CURRENT USE OF INSULIN (HCC): ICD-10-CM

## 2023-01-27 DIAGNOSIS — D32.9 BENIGN NEOPLASM OF MENINGES, UNSPECIFIED (HCC): ICD-10-CM

## 2023-01-27 DIAGNOSIS — J30.89 ALLERGIC RHINITIS DUE TO OTHER ALLERGIC TRIGGER, UNSPECIFIED SEASONALITY: ICD-10-CM

## 2023-01-27 DIAGNOSIS — N18.6 HYPERTENSIVE CKD, ESRD ON DIALYSIS (HCC): ICD-10-CM

## 2023-01-27 DIAGNOSIS — E11.22 TYPE 2 DIABETES MELLITUS WITH CHRONIC KIDNEY DISEASE ON CHRONIC DIALYSIS, WITHOUT LONG-TERM CURRENT USE OF INSULIN (HCC): ICD-10-CM

## 2023-01-27 DIAGNOSIS — I50.42 CHRONIC COMBINED SYSTOLIC AND DIASTOLIC CHF (CONGESTIVE HEART FAILURE) (HCC): ICD-10-CM

## 2023-01-27 DIAGNOSIS — Z23 ENCOUNTER FOR IMMUNIZATION: ICD-10-CM

## 2023-01-27 DIAGNOSIS — K21.9 GASTROESOPHAGEAL REFLUX DISEASE, UNSPECIFIED WHETHER ESOPHAGITIS PRESENT: ICD-10-CM

## 2023-01-27 DIAGNOSIS — E03.9 ACQUIRED HYPOTHYROIDISM: ICD-10-CM

## 2023-01-27 DIAGNOSIS — I12.0 HYPERTENSIVE CKD, ESRD ON DIALYSIS (HCC): ICD-10-CM

## 2023-01-27 DIAGNOSIS — Z99.2 TYPE 2 DIABETES MELLITUS WITH CHRONIC KIDNEY DISEASE ON CHRONIC DIALYSIS, WITHOUT LONG-TERM CURRENT USE OF INSULIN (HCC): ICD-10-CM

## 2023-01-27 DIAGNOSIS — G31.9 DEGENERATIVE DISEASE OF NERVOUS SYSTEM, UNSPECIFIED (HCC): ICD-10-CM

## 2023-01-27 DIAGNOSIS — L03.116 CELLULITIS OF LEFT LEG: Primary | ICD-10-CM

## 2023-01-27 DIAGNOSIS — I95.3 HEMODIALYSIS-ASSOCIATED HYPOTENSION: ICD-10-CM

## 2023-01-27 DIAGNOSIS — J44.9 CHRONIC OBSTRUCTIVE PULMONARY DISEASE, UNSPECIFIED COPD TYPE (HCC): ICD-10-CM

## 2023-01-27 PROBLEM — I50.22 CHRONIC SYSTOLIC (CONGESTIVE) HEART FAILURE (HCC): Status: ACTIVE | Noted: 2023-01-27

## 2023-01-27 PROCEDURE — G0008 ADMIN INFLUENZA VIRUS VAC: HCPCS | Performed by: FAMILY MEDICINE

## 2023-01-27 PROCEDURE — 3023F SPIROM DOC REV: CPT | Performed by: FAMILY MEDICINE

## 2023-01-27 PROCEDURE — 1090F PRES/ABSN URINE INCON ASSESS: CPT | Performed by: FAMILY MEDICINE

## 2023-01-27 PROCEDURE — 99214 OFFICE O/P EST MOD 30 MIN: CPT | Performed by: FAMILY MEDICINE

## 2023-01-27 PROCEDURE — 3046F HEMOGLOBIN A1C LEVEL >9.0%: CPT | Performed by: FAMILY MEDICINE

## 2023-01-27 PROCEDURE — 90694 VACC AIIV4 NO PRSRV 0.5ML IM: CPT | Performed by: FAMILY MEDICINE

## 2023-01-27 PROCEDURE — G8417 CALC BMI ABV UP PARAM F/U: HCPCS | Performed by: FAMILY MEDICINE

## 2023-01-27 PROCEDURE — G8400 PT W/DXA NO RESULTS DOC: HCPCS | Performed by: FAMILY MEDICINE

## 2023-01-27 PROCEDURE — G8427 DOCREV CUR MEDS BY ELIG CLIN: HCPCS | Performed by: FAMILY MEDICINE

## 2023-01-27 PROCEDURE — 2022F DILAT RTA XM EVC RTNOPTHY: CPT | Performed by: FAMILY MEDICINE

## 2023-01-27 PROCEDURE — G0009 ADMIN PNEUMOCOCCAL VACCINE: HCPCS | Performed by: FAMILY MEDICINE

## 2023-01-27 PROCEDURE — G8484 FLU IMMUNIZE NO ADMIN: HCPCS | Performed by: FAMILY MEDICINE

## 2023-01-27 PROCEDURE — 1036F TOBACCO NON-USER: CPT | Performed by: FAMILY MEDICINE

## 2023-01-27 PROCEDURE — 1124F ACP DISCUSS-NO DSCNMKR DOCD: CPT | Performed by: FAMILY MEDICINE

## 2023-01-27 PROCEDURE — 3017F COLORECTAL CA SCREEN DOC REV: CPT | Performed by: FAMILY MEDICINE

## 2023-01-27 PROCEDURE — 90732 PPSV23 VACC 2 YRS+ SUBQ/IM: CPT | Performed by: FAMILY MEDICINE

## 2023-01-27 RX ORDER — HYDROXYZINE 50 MG/1
50 TABLET, FILM COATED ORAL PRN
Qty: 90 TABLET | Refills: 3 | Status: SHIPPED | OUTPATIENT
Start: 2023-01-27

## 2023-01-27 RX ORDER — CHLORHEXIDINE GLUCONATE 213 G/1000ML
SOLUTION TOPICAL
Qty: 946 ML | Refills: 2 | Status: SHIPPED | OUTPATIENT
Start: 2023-01-27

## 2023-01-27 RX ORDER — OMEPRAZOLE 20 MG/1
20 CAPSULE, DELAYED RELEASE ORAL
Qty: 30 CAPSULE | Refills: 3 | Status: SHIPPED | OUTPATIENT
Start: 2023-01-27

## 2023-01-27 RX ORDER — ACETAMINOPHEN 325 MG/1
650 TABLET ORAL EVERY 6 HOURS PRN
Qty: 120 TABLET | Refills: 3 | Status: SHIPPED | OUTPATIENT
Start: 2023-01-27

## 2023-01-27 RX ORDER — LIDOCAINE 40 MG/G
CREAM TOPICAL
Qty: 120 G | Refills: 3 | Status: SHIPPED | OUTPATIENT
Start: 2023-01-27

## 2023-01-27 RX ORDER — ALBUTEROL SULFATE 90 UG/1
2 AEROSOL, METERED RESPIRATORY (INHALATION) EVERY 6 HOURS PRN
Qty: 36 G | Refills: 3 | Status: SHIPPED | OUTPATIENT
Start: 2023-01-27

## 2023-01-27 RX ORDER — ATORVASTATIN CALCIUM 40 MG/1
40 TABLET, FILM COATED ORAL EVERY EVENING
Qty: 90 TABLET | Refills: 3 | Status: SHIPPED | OUTPATIENT
Start: 2023-01-27

## 2023-01-27 RX ORDER — METOPROLOL SUCCINATE 25 MG/1
25 TABLET, EXTENDED RELEASE ORAL EVERY EVENING
Qty: 90 TABLET | Refills: 3 | Status: SHIPPED | OUTPATIENT
Start: 2023-01-27

## 2023-01-27 RX ORDER — DOXYCYCLINE HYCLATE 100 MG
100 TABLET ORAL 2 TIMES DAILY
Qty: 20 TABLET | Refills: 0 | Status: SHIPPED | OUTPATIENT
Start: 2023-01-27 | End: 2023-02-06

## 2023-01-27 RX ORDER — MIDODRINE HYDROCHLORIDE 5 MG/1
5 TABLET ORAL 2 TIMES DAILY PRN
Qty: 90 TABLET | Refills: 3 | Status: SHIPPED | OUTPATIENT
Start: 2023-01-27

## 2023-01-27 RX ORDER — LEVOTHYROXINE SODIUM 0.03 MG/1
25 TABLET ORAL
Qty: 90 TABLET | Refills: 3 | Status: SHIPPED | OUTPATIENT
Start: 2023-01-27

## 2023-01-27 RX ORDER — AMIODARONE HYDROCHLORIDE 100 MG/1
100 TABLET ORAL EVERY MORNING
Qty: 90 TABLET | Refills: 3 | Status: SHIPPED | OUTPATIENT
Start: 2023-01-27

## 2023-01-27 RX ORDER — DESLORATADINE 5 MG/1
TABLET ORAL
Qty: 90 TABLET | Refills: 3 | Status: SHIPPED | OUTPATIENT
Start: 2023-01-27

## 2023-01-27 RX ORDER — AMMONIUM LACTATE 12 G/100G
CREAM TOPICAL
COMMUNITY
Start: 2023-01-06

## 2023-01-27 ASSESSMENT — ENCOUNTER SYMPTOMS
ABDOMINAL PAIN: 0
DIARRHEA: 0
COUGH: 0
VOMITING: 0
CONSTIPATION: 0
WHEEZING: 0
NAUSEA: 0
ABDOMINAL DISTENTION: 0
CHEST TIGHTNESS: 0

## 2023-01-27 ASSESSMENT — PATIENT HEALTH QUESTIONNAIRE - PHQ9
SUM OF ALL RESPONSES TO PHQ QUESTIONS 1-9: 0
SUM OF ALL RESPONSES TO PHQ QUESTIONS 1-9: 0
2. FEELING DOWN, DEPRESSED OR HOPELESS: 0
SUM OF ALL RESPONSES TO PHQ9 QUESTIONS 1 & 2: 0
SUM OF ALL RESPONSES TO PHQ QUESTIONS 1-9: 0
1. LITTLE INTEREST OR PLEASURE IN DOING THINGS: 0
SUM OF ALL RESPONSES TO PHQ QUESTIONS 1-9: 0

## 2023-01-27 NOTE — PROGRESS NOTES
Dane Weaver (:  1950) is a 67 y.o. female,Established patient, here for evaluation of the following chief complaint(s): Hypothyroidism, Discuss Medications (BLOOD PRESSURE ), Congestive Heart Failure, Thyroid Problem, Diabetes, Hypertension, COPD, and Rash (Legs, left side worse)      ASSESSMENT/PLAN:    1. Cellulitis of left leg  worsening  Advised to underlying erythema, her son will help, and if gets worse in 2 days, the daughter was advised to take her mom over the weekend to the emergency room  Will start treatment with antibiotics promptly, take with food, clean legs with chlorhexidine, apply topical mupirocin on the left leg open blister  -     doxycycline hyclate (VIBRA-TABS) 100 MG tablet; Take 1 tablet by mouth 2 times daily for 10 days Take with food , for cellulitis left leg, Disp-20 tablet, R-0Normal  -     chlorhexidine (HIBICLENS) 4 % external liquid; Wash legs once a day and rinse well daily, Disp-946 mL, R-2Normal  2. Chronic combined systolic and diastolic CHF (congestive heart failure) (HCC)  Improved EF  Lab Results   Component Value Date    LVEF 63 2022   EF 45 to 50% on 2020  Low-salt diet, daily weight, blood pressure  Metoprolol XL 25 Mg, furosemide 80 Mg every morning    3. Paroxysmal atrial fibrillation (HCC)  Stable  On amiodarone, metoprolol XL 25 Mg  Not on chronic anticoagulation due to prior GI bleeding     -     amiodarone (PACERONE) 100 MG tablet; Take 1 tablet by mouth every morning, Disp-90 tablet, R-3Normal  4. Hypertensive heart and chronic kidney disease with heart failure and with stage 5 chronic kidney disease, or end stage renal disease (HCC)  Stable  On dialysis 3 times a week  Continue metoprolol XL 25 Mg  Blood pressure fluctuating, on midodrine for hypotension  Pending fistula creation  Patient will have blood work at dialysis  5.  Peripheral vascular disease, unspecified ([facility] [address]  Stable  She has never completed the PVR  Continue Lipitor 40 Mg daily  CTA abdomen 7/22/2022 showing iliac artery system patent bilaterally with mild to moderate stenosis involving common iliac arteries  Patient already seeing vascular, for fistula creation    6. Hypertensive CKD, ESRD on dialysis ([facility]  Improved with dialysis 3 times a week  Pending fistula placement  Monitoring closely at home and during dialysis, midodrine for hypotension    -     metoprolol succinate (TOPROL XL) 25 MG extended release tablet; Take 1 tablet by mouth every evening Hold if BP bellow 115/65. Disp-90 tablet, R-3Normal  7. Hyperlipidemia with target LDL less than 70  Improved with medication  Continue current treatment  -     atorvastatin (LIPITOR) 40 MG tablet; Take 1 tablet by mouth every evening Take 1 tablet by mouth once daily, Disp-90 tablet, R-3Normal  8. Acquired hypothyroidism  Improved with medication  Continue current treatment  Advised to take Synthroid in the morning, on empty stomach, without any other meds and with a full glass of water.    -     levothyroxine (SYNTHROID) 25 MCG tablet; Take 1 tablet by mouth every morning (before breakfast) without no other meds for 30 minutes, take when you first wake up and you are still bed, Disp-90 tablet, R-3Normal  9. Chronic obstructive pulmonary disease, unspecified COPD type ([facility]  Stable  Continue current treatment, counseling given regarding correct use of inhalers  -     tiotropium (SPIRIVA RESPIMAT) 2.5 MCG/ACT AERS inhaler; Inhale 2 puffs into the lungs every morning (before breakfast), Disp-36 g, R-3Normal  -     fluticasone-salmeterol (ADVAIR HFA) 115-21 MCG/ACT inhaler; Inhale 2 puffs into the lungs 2 times daily Maintenance inhaler, Disp-36 g, R-3Normal  -     albuterol sulfate HFA (PROAIR HFA) 108 (90 Base) MCG/ACT inhaler; Inhale 2 puffs into the lungs every 6 hours as needed for Wheezing or Shortness of Breath (cough), Disp-36 g, R-3Normal  10.  Type 2 diabetes mellitus with chronic kidney disease on chronic dialysis, without long-term current use of insulin ([facility] [address]  worsening  well-controlled  Low-carb diet advised  Will continue to monitor hemoglobin A1c every 3 to 6 months    Lab Results   Component Value Date    LABA1C 6.4 11/11/2022    LABA1C 4.6 03/31/2022    LABA1C 5.8 11/24/2021       11. Mild malnutrition (HCC)  Stable  Her weight is stable  We discussed increase proteins in diet, to eat more chicken, fish, dairy products if allowed by renal specialist    12. Benign neoplasm of meninges, unspecified (HCC)  Stable  Right temporal meningioma 2.6 cm on 6/27/2022 CT head, with possible increase in vasogenic edema  Patient has no headaches and no seizures, she declines any further evaluation of this    13. Encounter for immunization  -     Pneumococcal, PPSV23, PNEUMOVAX 21, (age 2 yrs+), SC/IM  -     Influenza, FLUAD, (age 72 y+), IM, Preservative Free, 0.5 mL  14. Allergic rhinitis due to other allergic trigger, unspecified seasonality  Improved with medication  Continue current treatment  -     desloratadine (CLARINEX) 5 MG tablet; Take 1 tablet by mouth once daily, Disp-90 tablet, R-3Normal  15. Hemodialysis-associated hypotension  Fluctuating blood pressure, does have blood pressure machine at home, blood pressure low after dialysis and sometimes at home, holding metoprolol when low, okay to do that, and administer midodrine  Discussed with Sea  -     midodrine (PROAMATINE) 5 MG tablet; Take 1 tablet by mouth 2 times daily as needed (if low BP below 115/65, take to dialysis), Disp-90 tablet, R-3Normal  16. Gastroesophageal reflux disease, unspecified whether esophagitis present  Improved with medication  Continue current treatment  -     omeprazole (PRILOSEC) 20 MG delayed release capsule; Take 1 capsule by mouth every morning (before breakfast), Disp-30 capsule, R-3Normal  17.  Moderate anxiety  Worsening  She has recently lost a family member  She is also very anxious about fistula placement, and vaccines, discussed with patient. Hydroxyzine as needed  -     hydrOXYzine HCl (ATARAX) 50 MG tablet; Take 1 tablet by mouth as needed for Anxiety 1 tablet before dialysis, Disp-90 tablet, R-3Normal  18. Degenerative disease of nervous system, unspecified ([facility] [address]  Stable  History of strokes and hypertensive microvascular changes  CT head 6/27/2022 showed mild chronic small vessel white matter changes with remote lacunar insults in the right caudate and left basal ganglia unchanged from prior      Lara Victoria received counseling on the following healthy behaviors: nutrition, exercise, medication adherence, and falls precautions    Reviewed prior labs and health maintenance  Discussed use, benefit, and side effects of prescribed medications. Barriers to medication compliance addressed. Patient given educational materials - see patient instructions  Was a self-tracking handout given in paper form or via [employer]? Yes  All patient questions answered. Patient voiced understanding. The patient's past medical,surgical, social, and family history as well as her current medications and allergies were reviewed as documented in today's encounter. Medications, labs, diagnostic studies, consultations and follow-up as documented in this encounter. Return in about 3 months (around 4/27/2023) for Visit type diabetes, **POC A1C, CHF, COPD. Data Unavailable    Future Appointments   Date Time Provider Angela Reyez   3/22/2023  2:00 PM ST DEXA RM [facility] M[facility]MO [facility]   3/22/2023  2:30 PM ST DIGITAL RM STCZ MAMMO [facility]   5/10/2023  2:00 PM [facility]   12/1/2023  3:30 PM MD [facility]         SUBJECTIVE/OBJECTIVE:      Comes with her daughter, Colby Romeo. Patient says she has been \"eating too much\" she says she eats a lot  \"Gello , eating de león. \"  Her daughter says she has been trying to cut down the water.   Her daughter says her brother lives with her and sometimes they eat de león,. Discussed that it contains a lot of salt and she can swell. Her daughter says that she eats frequently but smaller amounts. Does not gain weight. Patient reminds me that she was in rehab and she came home Nov 1st.  I reminded patient that I have seen her at that time. Anxiety slightly worse. Patient says her aunt passed away, I\"'m emotional \"  She declines vaccines, finally agrees. Patient says she is just afraid of needles. Roberta Leija says she had a scab on the left leg, but feels it is getting worse  Left leg swollen red and more swollen. Patient does not know when the scab appeared, but it was present when her daughter took her to the podiatry a few days ago. They placed a Band-Aid over the scab. Denies fever, chills, night sweats. Hypertension, congestive heart failure , Chronic kidney disease stage 5 on dialysis 3 times a week through PICC line  She is scheduled for fistula, she is afraid of fistula, discussed risk of infection of PICC line. Paroxismal AFib  Coronary artery disease status post stents and CABG  Patient here for follow-up. She is not exercising and is NOT adherent to low salt diet. Currymelisa Liss says she has been eating de león, but not all the times. Blood pressure  fluctuates  at home. Cardiac symptoms dyspnea, fatigue, and lower extremity edema. Patient denies chest pain, chest pressure/discomfort, claudication, exertional chest pressure/discomfort, irregular heart beat, near-syncope, orthopnea, palpitations, paroxysmal nocturnal dyspnea, syncope, and tachypnea. Cardiovascular risk factors: advanced age (older than 54 for men, 72 for women), diabetes mellitus, dyslipidemia, hypertension, sedentary lifestyle, and CKD . Use of agents associated with hypertension: thyroid hormones. History of target organ damage: angina/ prior myocardial infarction, chronic kidney disease, heart failure, peripheral artery disease, and prior coronary revascularization.       CTA abdomnen 7/22/2022-shows iliac artery stenosis mild to moderate she does not walk much, denies muscle cramps when walking inside of the house. CTA PELVIS:       Iliac arterial system is patent bilaterally with mild-moderate stenosis   involving the common iliac arteries       fluctuating BPs related to dialysis. Her daughter says the blood pressure drops during the dialysis and they are giving her midodrine  Taking Midodrine when BP is low when home too, on the days of having dialysis. borderline low BP today  Patient says she is not making much urine, but still taking furosemide per nephrologist  BP Readings from Last 3 Encounters:   01/27/23 (!) 112/54   11/25/22 (!) 140/62   11/11/22 112/70          Pulse is Normal.    Pulse Readings from Last 3 Encounters:   01/27/23 71   11/25/22 62   11/11/22 87       Hyperlipidemia:  No new myalgias or GI upset on atorvastatin (Lipitor). Medication compliance: compliant all of the time. Patient is  following a low fat, low cholesterol diet. LDL is normal  Lab Results   Component Value Date    LDLCHOLESTEROL 41 09/19/2022     Lab Results   Component Value Date    TRIG 144 09/19/2022    TRIG 132 12/06/2021    TRIG 112 07/27/2020     Hypothyroidism: Recent symptoms: fatigue, cold intolerance, edema, and anxiety. She denies weight gain, heat intolerance, hair loss, dry skin, constipation, diarrhea, tremor, palpitations, and dysphagia. Patient is  taking her medication consistently on an empty stomach. TSH is Normal.    No results found for: [facility]  Lab Results   Component Value Date    TSH 0.86 09/19/2022    TSH 9.54 (H) 06/27/2022    TSH 5.51 (H) 06/12/2022           COPD:  Current treatment includes short-acting beta agonist inhaler, nebulized beta agonist, combined beta agonist/steroid inhaler, anticholinergic inhaler, which has been effective. Residual symptoms: chronic dyspnea. She denies purulent sputum, wheezing, chest tightness, chest pain. Nicotine dependence. NO  Counseling given: Yes    diabetes mellitus type 2    A1c worsening but well controlled    She is not on medications anymore  She has been only keeping low-carb diet  She does check blood glucose at home  Lab Results   Component Value Date    LABA1C 6.4 11/11/2022    LABA1C 4.6 03/31/2022    LABA1C 5.8 11/24/2021     Mild malnutrition  Discussed to increase proteins in diet  Weight is stable  Moses reports good appetite  Wt Readings from Last 3 Encounters:   01/27/23 125 lb (56.7 kg)   11/25/22 125 lb (56.7 kg)   11/11/22 125 lb 12.8 oz (57.1 kg)     Has known meningioma  Declines any intervention  Will observe only  Denies headache or seizures    Patient does have allergies, for which she takes allergy medication which has been helping. Occasionally does get sneezing and runny nose with clear mucus    Heartburn is better, tolerates omeprazole well, denies side effects. She denies nausea, vomiting, abdominal pain, diarrhea or constipation. PHQ-2 Over the past 2 weeks, how often have you been bothered by any of the following problems? Little interest or pleasure in doing things: Not at all  Feeling down, depressed, or hopeless: Not at all  PHQ-2 Score: 0  PHQ-9 Over the past 2 weeks, how often have you been bothered by any of the following problems? PHQ-9 Total Score: 0  PHQ-9 Total Score: 0      PHQ Scores 1/27/2023 11/25/2022 11/11/2022 8/12/2022 1/7/2022 11/24/2021 9/22/2021   PHQ2 Score 0 0 0 2 0 0 0   PHQ9 Score 0 0 0 2 0 0 0       Prior to Visit Medications    Medication Sig Taking?  Authorizing Provider   ammonium lactate (AMLACTIN) 12 % cream APPLY CREAM TOPICALLY ONCE DAILY FOR SCALY SKIN ON THE LEGS Yes Historical Provider, MD   amiodarone (PACERONE) 100 MG tablet Take 1 tablet by mouth every morning Yes Jany Ramachandran MD   tiotropium (SPIRIVA RESPIMAT) 2.5 MCG/ACT AERS inhaler Inhale 2 puffs into the lungs every morning (before breakfast) Yes Kayleigh Cardoso MD   fluticasone-salmeterol (ADVAIR HFA) 115-21 MCG/ACT inhaler Inhale 2 puffs into the lungs 2 times daily Maintenance inhaler Yes Tamiko Gonzalez MD   mupirocin (BACTROBAN) 2 % ointment Apply topically 3 times daily x 10 days. Yes Tamiko Gonzalez MD   desloratadine (CLARINEX) 5 MG tablet Take 1 tablet by mouth once daily Yes Tamiko Gonzalez MD   metoprolol succinate (TOPROL XL) 25 MG extended release tablet Take 1 tablet by mouth every evening Hold if BP bellow 115/65. Stop metoprolol tartrate Yes Tamiko Gonzalez MD   atorvastatin (LIPITOR) 40 MG tablet Take 1 tablet by mouth every evening Take 1 tablet by mouth once daily Yes Tamiko Gonzalez MD   levothyroxine (SYNTHROID) 25 MCG tablet Take 1 tablet by mouth every morning (before breakfast) without no other meds for 30 minutes, take when you first wake up and you are still bed Yes Tamiko Gonzalez MD   midodrine (PROAMATINE) 5 MG tablet Take 1 tablet by mouth 2 times daily as needed (if low BP below 115/65, take to dialysis) Yes Tamiko Gonzalez MD   omeprazole (PRILOSEC) 20 MG delayed release capsule Take 1 capsule by mouth every morning (before breakfast) Yes Tamiko Gonzalez MD   acetaminophen (TYLENOL) 325 MG tablet Take 2 tablets by mouth every 6 hours as needed for Pain Yes Tamiko Gonzalez MD   hydrOXYzine HCl (ATARAX) 50 MG tablet Take 1 tablet by mouth as needed for Anxiety 1 tablet before dialysis Yes Tamiko Gonzalez MD   albuterol sulfate HFA (PROAIR HFA) 108 (90 Base) MCG/ACT inhaler Inhale 2 puffs into the lungs every 6 hours as needed for Wheezing or Shortness of Breath (cough) Yes Tamiko Gonzalez MD   furosemide (LASIX) 80 MG tablet Take 1 tablet by mouth every morning Water pill Yes Tamiko Gonzalez MD   Blood Pressure KIT Diagnosis: HTN. Needs to check blood pressure 1-2 times a day until stable, then once a day. Goal blood pressure less than 135/85, and above 110/60.  Yes Tamiko Gonzalez MD Nebulizers (COMPRESSOR/NEBULIZER) MISC Nebulizer with compressor. Disposable Med Nebs 2 /month. Reusable Med Nebs 1 per 6 months. Aerosol Mask 1 per month. Replacement Filters 2 per month. All other related supplies as needed per month. Medications being used: Albuterol . 083 unit dose vial. Frequency: every 6 hrs x 4/day . Length of Need: 1 Yes María Elena Jorge MD   Handicap Placard MISC by Does not apply route Expires on 25 Yes María Elena Jorge MD   magnesium oxide (MAG-OX) 400 (241.3 Mg) MG TABS tablet Take 1 tablet by mouth twice daily Yes Reji Almanza MD   [facility] [place] Devices (EGG CRATE BED PAD) MISC 1 Units by Does not apply route daily  Patient not taking: Reported on 2023  Reji Almanza MD   apixaban (ELIQUIS) 5 MG TABS tablet Take 1 tablet by mouth 2 times daily  Ekta Nair MD   amLODIPine (NORVASC) 10 MG tablet Take 1 tablet by mouth nightly  Jignesh Garcia MD       Social History     Tobacco Use    Smoking status: Former     Packs/day: 0.25     Years: 10.00     Pack years: 2.50     Types: Cigarettes     Quit date: 2000     Years since quittin.0    Smokeless tobacco: Never   Vaping Use    Vaping Use: Never used   Substance Use Topics    Alcohol use: Not Currently     Alcohol/week: 0.0 standard drinks    Drug use: No         Review of Systems   Constitutional:  Positive for fatigue. Negative for activity change, appetite change, chills, diaphoresis, fever and unexpected weight change. HENT:  Positive for hearing loss (she is very hard of hearing), rhinorrhea and sneezing. Negative for congestion, postnasal drip, sinus pressure and sinus pain. Respiratory:  Positive for shortness of breath (FLORES). Negative for cough, chest tightness and wheezing. Cardiovascular:  Positive for leg swelling. Negative for chest pain and palpitations. Gastrointestinal:  Negative for abdominal distention, abdominal pain, constipation, diarrhea, nausea and vomiting.    Endocrine: Positive for cold intolerance. Negative for heat intolerance, polydipsia, polyphagia and polyuria. Genitourinary:  Positive for decreased urine volume. Negative for frequency and urgency. Very little urination   Musculoskeletal:  Positive for arthralgias and gait problem. In wheelchair   Skin:  Positive for rash (legs) and wound (left leg). Allergic/Immunologic: Positive for immunocompromised state (due to chronic kidney disease stage V on dialysis). Neurological:  Positive for weakness (generalized, same as before). Negative for dizziness and light-headedness. Hematological:  Bruises/bleeds easily. Psychiatric/Behavioral:  Positive for sleep disturbance (sleeping more). Negative for dysphoric mood, self-injury and suicidal ideas. The patient is nervous/anxious.        -vital signs stable and within normal limits except borderline low blood pressure and mildly low pulse ox    BP (!) 112/54   Pulse 71   Temp 97.4 °F (36.3 °C)   Ht 4' 11\" (1.499 m)   Wt 125 lb (56.7 kg)   SpO2 94%   BMI 25.25 kg/m²        Physical Exam  Vitals and nursing note reviewed. Constitutional:       General: She is not in acute distress. Appearance: Normal appearance. She is well-developed. She is not diaphoretic. HENT:      Head: Normocephalic and atraumatic. Right Ear: External ear normal. Decreased hearing noted. Left Ear: External ear normal. Decreased hearing noted. Nose: No mucosal edema. Mouth/Throat:      Comments: I did not examine the mouth due to coronavirus pandemic and wearing masks    Eyes:      General: Lids are normal. No scleral icterus. Right eye: No discharge. Left eye: No discharge. Extraocular Movements: Extraocular movements intact. Conjunctiva/sclera: Conjunctivae normal.   Neck:      Thyroid: No thyromegaly. Cardiovascular:      Rate and Rhythm: Normal rate. Rhythm regularly irregular. Heart sounds: Normal heart sounds. No murmur heard.   Pulmonary: Effort: Pulmonary effort is normal. No respiratory distress. Breath sounds: Normal breath sounds. No wheezing or rales. Chest:      Chest wall: No tenderness. Abdominal:      General: Bowel sounds are normal. There is distension. Palpations: Abdomen is soft. There is no hepatomegaly, splenomegaly or mass. Tenderness: There is no abdominal tenderness. There is no guarding or rebound. Musculoskeletal:         General: Deformity present. No tenderness. Cervical back: Normal range of motion and neck supple. Right lower le+ Pitting Edema present. Left lower le+ Pitting Edema present. Comments: Thoracic kyphosis   Skin:     General: Skin is warm and dry. Capillary Refill: Capillary refill takes less than 2 seconds. Findings: Erythema and rash present. Comments: Left leg more swollen than the right side, with extensive redness on the anterior aspect, with a central superficial excoriation 1 cm x 1 cm x 2 mm of yellowish discharge, covered with Band-Aid. The left leg is red, warm, and slightly tender   Right leg mildly swollen, with pink stasis dermatitis and dry skin, no open areas, no cellulitis. Neurological:      Mental Status: She is alert and oriented to person, place, and time. Cranial Nerves: No cranial nerve deficit. Motor: No abnormal muscle tone. Gait: Gait abnormal.      Comments: Comes in wheelchair today   Psychiatric:         Mood and Affect: Mood is anxious. Behavior: Behavior normal.         Thought Content: Thought content normal.         Judgment: Judgment normal.         I personally reviewed testing with patient and all questions fully answered.   Mild anemia of chronic disease  Hemoglobin A1c well controlled  Chronic kidney disease stage V  Hyperglycemia well-controlled  high potassium    Low sodium  Otherwise labs within normal limits    Office Visit on 2022   Component Date Value Ref Range Status Hemoglobin A1C 11/11/2022 6.4  % Final         Lab Results   Component Value Date    WBC 8.2 09/19/2022    HGB 11.4 (L) 09/19/2022    HCT 35.7 (L) 09/19/2022    MCV 96.0 09/19/2022     09/19/2022       Lab Results   Component Value Date/Time     10/18/2022 10:41 AM    K 5.6 10/18/2022 10:41 AM    CL 91 10/18/2022 10:41 AM    CO2 16 10/18/2022 10:41 AM    BUN 33 10/18/2022 10:41 AM    CREATININE 3.51 10/18/2022 10:41 AM    GLUCOSE 92 10/18/2022 10:41 AM    CALCIUM 9.4 10/18/2022 10:41 AM        Lab Results   Component Value Date    ALT 9 09/19/2022    AST 15 09/19/2022    ALKPHOS 109 (H) 09/19/2022    BILITOT 0.2 (L) 09/19/2022       Lab Results   Component Value Date    TSH 0.86 09/19/2022       Lab Results   Component Value Date    CHOL 116 09/19/2022    CHOL 135 12/06/2021    CHOL 117 07/27/2020     Lab Results   Component Value Date    TRIG 144 09/19/2022    TRIG 132 12/06/2021    TRIG 112 07/27/2020     Lab Results   Component Value Date    HDL 46 09/19/2022    HDL 59 12/06/2021    HDL 48 07/27/2020     Lab Results   Component Value Date    LDLCHOLESTEROL 41 09/19/2022    LDLCHOLESTEROL 50 12/06/2021    LDLCHOLESTEROL 47 07/27/2020     Lab Results   Component Value Date    CHOLHDLRATIO 2.5 09/19/2022    CHOLHDLRATIO 2.3 12/06/2021    CHOLHDLRATIO 2.4 07/27/2020       Lab Results   Component Value Date    PNCYBSNQ55 865 10/18/2022     Lab Results   Component Value Date    FOLATE >20.0 10/18/2022     Lab Results   Component Value Date    VITD25 51.6 09/19/2022         Orders Placed This Encounter   Medications    amiodarone (PACERONE) 100 MG tablet     Sig: Take 1 tablet by mouth every morning     Dispense:  90 tablet     Refill:  3    tiotropium (SPIRIVA RESPIMAT) 2.5 MCG/ACT AERS inhaler     Sig: Inhale 2 puffs into the lungs every morning (before breakfast)     Dispense:  36 g     Refill:  3    fluticasone-salmeterol (ADVAIR HFA) 115-21 MCG/ACT inhaler     Sig: Inhale 2 puffs into the lungs 2 times daily Maintenance inhaler     Dispense:  36 g     Refill:  3    mupirocin (BACTROBAN) 2 % ointment     Sig: Apply topically 3 times daily x 10 days. Dispense:  90 g     Refill:  3    desloratadine (CLARINEX) 5 MG tablet     Sig: Take 1 tablet by mouth once daily     Dispense:  90 tablet     Refill:  3    metoprolol succinate (TOPROL XL) 25 MG extended release tablet     Sig: Take 1 tablet by mouth every evening Hold if BP bellow 115/65.  Stop metoprolol tartrate     Dispense:  90 tablet     Refill:  3    atorvastatin (LIPITOR) 40 MG tablet     Sig: Take 1 tablet by mouth every evening Take 1 tablet by mouth once daily     Dispense:  90 tablet     Refill:  3    levothyroxine (SYNTHROID) 25 MCG tablet     Sig: Take 1 tablet by mouth every morning (before breakfast) without no other meds for 30 minutes, take when you first wake up and you are still bed     Dispense:  90 tablet     Refill:  3    midodrine (PROAMATINE) 5 MG tablet     Sig: Take 1 tablet by mouth 2 times daily as needed (if low BP below 115/65, take to dialysis)     Dispense:  90 tablet     Refill:  3    omeprazole (PRILOSEC) 20 MG delayed release capsule     Sig: Take 1 capsule by mouth every morning (before breakfast)     Dispense:  30 capsule     Refill:  3    acetaminophen (TYLENOL) 325 MG tablet     Sig: Take 2 tablets by mouth every 6 hours as needed for Pain     Dispense:  120 tablet     Refill:  3    hydrOXYzine HCl (ATARAX) 50 MG tablet     Sig: Take 1 tablet by mouth as needed for Anxiety 1 tablet before dialysis     Dispense:  90 tablet     Refill:  3    albuterol sulfate HFA (PROAIR HFA) 108 (90 Base) MCG/ACT inhaler     Sig: Inhale 2 puffs into the lungs every 6 hours as needed for Wheezing or Shortness of Breath (cough)     Dispense:  36 g     Refill:  3    lidocaine (LMX) 4 % cream     Si g topical 2-3 times a day as needed for pain, maximum 20 g/day     Dispense:  120 g     Refill:  3    doxycycline hyclate (VIBRA-TABS) 100 MG tablet     Sig: Take 1 tablet by mouth 2 times daily for 10 days Take with food , for cellulitis left leg     Dispense:  20 tablet     Refill:  0    chlorhexidine (HIBICLENS) 4 % external liquid     Sig: Wash legs once a day and rinse well daily     Dispense:  946 mL     Refill:  2       Orders Placed This Encounter   Procedures    Pneumococcal, PPSV23, PNEUMOVAX 23, (age 2 yrs+), SC/IM    Influenza, FLUAD, (age 72 y+), IM, Preservative Free, 0.5 mL       Medications Discontinued During This Encounter   Medication Reason    amiodarone (PACERONE) 100 MG tablet REORDER    tiotropium (SPIRIVA RESPIMAT) 2.5 MCG/ACT AERS inhaler REORDER    midodrine (PROAMATINE) 5 MG tablet DUPLICATE    levothyroxine (SYNTHROID) 25 MCG tablet DUPLICATE    metoprolol succinate (TOPROL XL) 25 MG extended release tablet DUPLICATE    Zinc Oxide 10 % OINT Therapy completed    ascorbic acid (VITAMIN C) 500 MG tablet Therapy completed    Multiple Vitamins-Minerals (THERAPEUTIC MULTIVITAMIN-MINERALS) tablet Therapy completed    albuterol sulfate HFA (PROAIR HFA) 108 (90 Base) MCG/ACT inhaler REORDER    acetaminophen (TYLENOL) 325 MG tablet REORDER    hydrOXYzine HCl (ATARAX) 50 MG tablet REORDER    omeprazole (PRILOSEC) 20 MG delayed release capsule REORDER    metoprolol succinate (TOPROL XL) 25 MG extended release tablet REORDER    atorvastatin (LIPITOR) 40 MG tablet REORDER    levothyroxine (SYNTHROID) 25 MCG tablet REORDER    midodrine (PROAMATINE) 5 MG tablet REORDER    desloratadine (CLARINEX) 5 MG tablet REORDER    mupirocin (BACTROBAN) 2 % ointment REORDER    fluticasone-salmeterol (ADVAIR HFA) 115-21 MCG/ACT inhaler REORDER         On this date 1/27/2023 I have spent 39 minutes reviewing previous notes, test results and face to face with the patient discussing the diagnosis and importance of compliance with the treatment plan as well as documenting on the day of the visit and care coordination with her daughter.      This note was completed by using the assistance of a speech-recognition program. However, inadvertent computerized transcription errors may be present. Although every effort was made to ensure accuracy, no guarantees can be provided that every mistake has been identified and corrected by editing. An electronic signature was used to authenticate this note.   Electronically signed by Sweta Danielle MD on 1/29/2023 at 6:34 PM

## 2023-01-29 VITALS
HEART RATE: 71 BPM | BODY MASS INDEX: 25.2 KG/M2 | DIASTOLIC BLOOD PRESSURE: 54 MMHG | WEIGHT: 125 LBS | HEIGHT: 59 IN | SYSTOLIC BLOOD PRESSURE: 112 MMHG | OXYGEN SATURATION: 94 % | TEMPERATURE: 97.4 F

## 2023-01-29 PROBLEM — N18.4 CKD STAGE 4 SECONDARY TO HYPERTENSION (HCC): Status: RESOLVED | Noted: 2020-02-20 | Resolved: 2023-01-29

## 2023-01-29 PROBLEM — I25.2 OLD MYOCARDIAL INFARCTION: Status: RESOLVED | Noted: 2022-07-18 | Resolved: 2023-01-29

## 2023-01-29 PROBLEM — E03.9 ACQUIRED HYPOTHYROIDISM: Status: ACTIVE | Noted: 2023-01-29

## 2023-01-29 PROBLEM — L85.3 DRY SKIN DERMATITIS: Status: RESOLVED | Noted: 2020-02-20 | Resolved: 2023-01-29

## 2023-01-29 PROBLEM — K64.4 EXTERNAL HEMORRHOID: Status: RESOLVED | Noted: 2022-07-10 | Resolved: 2023-01-29

## 2023-01-29 PROBLEM — I12.9 CKD STAGE 4 SECONDARY TO HYPERTENSION (HCC): Status: RESOLVED | Noted: 2020-02-20 | Resolved: 2023-01-29

## 2023-01-29 PROBLEM — Z28.21 INFLUENZA VACCINATION DECLINED: Status: RESOLVED | Noted: 2020-10-04 | Resolved: 2023-01-29

## 2023-01-29 PROBLEM — J45.20 MILD INTERMITTENT ASTHMA, UNCOMPLICATED: Status: RESOLVED | Noted: 2022-07-18 | Resolved: 2023-01-29

## 2023-01-29 PROBLEM — I50.42 CHRONIC COMBINED SYSTOLIC AND DIASTOLIC CHF (CONGESTIVE HEART FAILURE) (HCC): Status: ACTIVE | Noted: 2023-01-27

## 2023-01-29 PROBLEM — D32.0 MENINGIOMA, CEREBRAL (HCC): Status: RESOLVED | Noted: 2020-07-26 | Resolved: 2023-01-29

## 2023-01-29 PROBLEM — R79.89 TSH ELEVATION: Status: RESOLVED | Noted: 2022-06-15 | Resolved: 2023-01-29

## 2023-01-29 PROBLEM — R00.1 BRADYCARDIA: Status: RESOLVED | Noted: 2022-06-12 | Resolved: 2023-01-29

## 2023-01-29 PROBLEM — J30.9 ALLERGIC RHINITIS: Status: ACTIVE | Noted: 2023-01-29

## 2023-01-29 PROBLEM — I50.32 CHRONIC DIASTOLIC CONGESTIVE HEART FAILURE (HCC): Status: RESOLVED | Noted: 2020-02-20 | Resolved: 2023-01-29

## 2023-01-29 PROBLEM — R53.81 DEBILITY: Status: RESOLVED | Noted: 2022-07-12 | Resolved: 2023-01-29

## 2023-01-29 PROBLEM — K92.2 GASTROINTESTINAL HEMORRHAGE: Status: RESOLVED | Noted: 2022-07-20 | Resolved: 2023-01-29

## 2023-01-29 ASSESSMENT — ENCOUNTER SYMPTOMS
SHORTNESS OF BREATH: 1
RHINORRHEA: 1
SINUS PAIN: 0
SINUS PRESSURE: 0

## 2023-02-10 ENCOUNTER — TELEPHONE (OUTPATIENT)
Dept: FAMILY MEDICINE CLINIC | Age: 73
End: 2023-02-10

## 2023-02-10 DIAGNOSIS — L03.116 CELLULITIS OF LEFT LEG: ICD-10-CM

## 2023-02-10 RX ORDER — DOXYCYCLINE HYCLATE 100 MG
100 TABLET ORAL 2 TIMES DAILY
Qty: 20 TABLET | Refills: 0 | Status: SHIPPED | OUTPATIENT
Start: 2023-02-10 | End: 2023-02-20

## 2023-02-10 NOTE — TELEPHONE ENCOUNTER
Please let the patient know to  prescription from the pharmacy. Orders Placed This Encounter   Medications    doxycycline hyclate (VIBRA-TABS) 100 MG tablet     Sig: Take 1 tablet by mouth 2 times daily for 10 days Take with food , for cellulitis left leg     Dispense:  20 tablet     Refill:  0    chlorhexidine gluconate (ANTISEPTIC SKIN CLEANSER) 4 % SOLN external solution     Sig: To wash legs daily including the open skin areas, then pat dry, for skin decontamination     Dispense:  473 mL     Refill:  [facility], [address]  Phone: [phone] Fax: [phone]      No orders of the defined types were placed in this encounter. Future Appointments   Date Time Provider Angela Reyez   3/22/2023  2:00 PM [facility] DEXA RM STCZ MAMMO [facility] Radiolog   3/22/2023  2:30  [facility] DIGITAL RM STCZ MAMMO [address] [facility] Radiolog   5/10/2023  2:00 PM MD mary Tanner [facility]   12/1/2023  3:30 PM [facility]       Thank you!

## 2023-02-10 NOTE — TELEPHONE ENCOUNTER
Pt. Daughter called stated that daughter is now experiencing cellulitis  ON HER RIGHT LEG, VERY PAINFUL. SHE WILL LIKE TO KNOW IF ADDITIONAL SCRIPTS CAN BE CALLED IN. SHE DOES NOT WANT IT TO WORSEN. Please Approve or Refuse.   Send to Pharmacy per Pt's Request: [facility]     Next Visit Date:  5/10/2023   Last Visit Date: 1/27/2023    Hemoglobin A1C (%)   Date Value   11/11/2022 6.4   03/31/2022 4.6   11/24/2021 5.8             ( goal A1C is < 7)   BP Readings from Last 3 Encounters:   01/27/23 (!) 112/54   11/25/22 (!) 140/62   11/11/22 112/70          (goal 120/80)  BUN   Date Value Ref Range Status   10/18/2022 33 (H) 8 - 23 mg/dL Final     Creatinine   Date Value Ref Range Status   10/18/2022 3.51 (H) 0.50 - 0.90 mg/dL Final     Potassium   Date Value Ref Range Status   10/18/2022 5.6 (H) 3.7 - 5.3 mmol/L Final

## 2023-02-22 PROBLEM — E11.42 TYPE 2 DIABETES MELLITUS WITH POLYNEUROPATHY (HCC): Status: ACTIVE | Noted: 2023-02-22

## 2023-03-06 ENCOUNTER — HOSPITAL ENCOUNTER (EMERGENCY)
Age: 73
Discharge: HOME | End: 2023-03-06
Attending: EMERGENCY MEDICINE
Payer: MEDICARE

## 2023-03-06 VITALS
TEMPERATURE: 98.1 F | HEART RATE: 98 BPM | WEIGHT: 125 LBS | BODY MASS INDEX: 25.25 KG/M2 | SYSTOLIC BLOOD PRESSURE: 132 MMHG | RESPIRATION RATE: 18 BRPM | DIASTOLIC BLOOD PRESSURE: 68 MMHG | OXYGEN SATURATION: 98 %

## 2023-03-06 DIAGNOSIS — S81.801A WOUND OF RIGHT LOWER EXTREMITY, INITIAL ENCOUNTER: Primary | ICD-10-CM

## 2023-03-06 DIAGNOSIS — E11.42 TYPE 2 DIABETES MELLITUS WITH POLYNEUROPATHY (HCC): ICD-10-CM

## 2023-03-06 PROCEDURE — 99283 EMERGENCY DEPT VISIT LOW MDM: CPT

## 2023-03-06 RX ORDER — DOXYCYCLINE HYCLATE 100 MG
100 TABLET ORAL 2 TIMES DAILY
Qty: 14 TABLET | Refills: 0 | Status: SHIPPED | OUTPATIENT
Start: 2023-03-06 | End: 2023-03-13

## 2023-03-06 ASSESSMENT — ENCOUNTER SYMPTOMS: VOMITING: 0

## 2023-03-06 ASSESSMENT — PAIN - FUNCTIONAL ASSESSMENT: PAIN_FUNCTIONAL_ASSESSMENT: 0-10

## 2023-03-06 ASSESSMENT — PAIN SCALES - GENERAL: PAINLEVEL_OUTOF10: 3

## 2023-03-07 DIAGNOSIS — K21.9 GASTROESOPHAGEAL REFLUX DISEASE, UNSPECIFIED WHETHER ESOPHAGITIS PRESENT: ICD-10-CM

## 2023-03-07 RX ORDER — OMEPRAZOLE 20 MG/1
CAPSULE, DELAYED RELEASE ORAL
Qty: 30 CAPSULE | Refills: 0 | Status: SHIPPED | OUTPATIENT
Start: 2023-03-07 | End: 2023-03-08 | Stop reason: SDUPTHER

## 2023-03-07 NOTE — ED PROVIDER NOTES
[address]  Emergency Department Encounter  Emergency Medicine Resident     Pt He Ojeda  MRN: [id number]  [place] 1950  Date of evaluation: 3/6/23  PCP:  Kris Lu MD  Note Started: 7:43 PM EST      CHIEF COMPLAINT       Chief Complaint   Patient presents with    Cellulitis    Wound Check       HISTORY OF PRESENT ILLNESS  (Location/Symptom, Timing/Onset, Context/Setting, Quality, Duration, Modifying Factors, Severity.)      Brielle Mcconnell is a 67 y.o. female who presents with right lower extremity wound with worsening redness today. Patient reports that she has had this wound for about a month, has been seen by her PCP and has been doing daily wound changes with her son. She has wounds on both legs, the one on the left leg has been healing well, 1 on the right leg after changing the dressing this morning they saw erythema around the outside of the wound with minimal drainage, increasing pain. She was given a prescription for doxycycline 2 weeks ago and finished that antibiotic course last week. Currently rates her pain as a 3 out of 10.     PAST MEDICAL / SURGICAL / SOCIAL / FAMILY HISTORY      has a past medical history of Acute CVA (cerebrovascular accident) ([address] Acute kidney injury superimposed on CKD ([address] Acute on chronic combined systolic (congestive) and diastolic (congestive) heart failure ([address] Acute respiratory failure with hypoxia and hypercapnia ([address] JOY (acute kidney injury) ([address] Arthritis, Asthma, Bilateral lower extremity edema, Bradycardia, Chronic diastolic congestive heart failure ([address] Chronic ulcer of left leg with fat layer exposed ([address] CKD stage 4 secondary to hypertension ([address] Dependence on renal dialysis ([address] ESRD (end stage renal disease) ([address] Essential hypertension, Gastrointestinal hemorrhage, Hallucinations, Hard of hearing, Head contusion, Hyperkalemia, Iron deficiency anemia, unspecified, Leg ulcer, left, with fat layer exposed ([address] Lymphedema, Meningioma ([address] Meningioma, cerebral ([address] Mild intermittent asthma, uncomplicated, Myocardial infarction ([address] Nephrotic range proteinuria, Obesity (BMI 30-39.9), Old myocardial infarction, Pressure injury of buttock, stage 2 ([address] Pressure injury of sacral region, stage 2 ([address] Pulmonary edema cardiac cause ([address] Pure hypercholesterolemia, Sepsis ([address] Severe malnutrition ([address] Shock ([address] Stage 4 chronic kidney disease ([address] Symptomatic anemia, Toxic metabolic encephalopathy, Type 2 diabetes mellitus with polyneuropathy ([address] Type 2 diabetes mellitus with stage 4 chronic kidney disease, without long-term current use of insulin ([address] Ulcer of right leg, with fat layer exposed ([address] Unspecified venous (peripheral) insufficiency, and Unspecified vitamin D deficiency. has a past surgical history that includes Tonsillectomy and adenoidectomy; Coronary artery bypass graft; intubation (3/7/2022); Thoracentesis (3/10/2022); Upper gastrointestinal endoscopy (N/A, 3/15/2022); Colonoscopy (N/A, 3/15/2022); IR NONTUNNELED VASCULAR CATHETER > 5 YEARS (2022); IR TUNNELED CVC PLACE WO SQ PORT/PUMP > 5 YEARS (2022); and Upper gastrointestinal endoscopy (N/A, 2022). Social History     Socioeconomic History    Marital status:       Spouse name: Not on file    Number of children: 10    Years of education: Not on file    Highest education level: Not on file   Occupational History    Not on file   Tobacco Use    Smoking status: Former     Packs/day: 0.25     Years: 10.00     Pack years: 2.50     Types: Cigarettes     Quit date: 2000     Years since quittin.1    Smokeless tobacco: Never   Vaping Use    Vaping Use: Never used   Substance and Sexual Activity    Alcohol use: Not Currently     Alcohol/week: 0.0 standard drinks    Drug use: No    Sexual activity: Yes     Partners: Female   Other Topics Concern    Not on file   Social History Narrative  Not on file     Social Determinants of Health     Financial Resource Strain: Low Risk     Difficulty of Paying Living Expenses: Not hard at all   Food Insecurity: No Food Insecurity    Worried About Running Out of Food in the Last Year: Never true    Holden of Food in the Last Year: Never true   Transportation Needs: Not on file   Physical Activity: Inactive    Days of Exercise per Week: 0 days    Minutes of Exercise per Session: 0 min   Stress: Not on file   Social Connections: Not on file   Intimate Partner Violence: Not on file   Housing Stability: Not on file       Family History   Problem Relation Age of Onset    Diabetes Mother     Heart Disease Mother     Heart Disease Father     Diabetes Sister     High Blood Pressure Sister     Diabetes Maternal Grandmother        Allergies:  Latex, Aleve [naproxen sodium], Apixaban, Aspirin, Naproxen, Pioglitazone, Claritin [loratadine], Keflex [cephalexin], and Lisinopril    Home Medications:  Prior to Admission medications    Medication Sig Start Date End Date Taking?  Authorizing Provider   doxycycline hyclate (VIBRA-TABS) 100 MG tablet Take 1 tablet by mouth 2 times daily for 7 days 3/6/23 3/13/23 Yes Brenda Salas, DO   chlorhexidine gluconate (ANTISEPTIC SKIN CLEANSER) 4 % SOLN external solution To wash legs daily including the open skin areas, then pat dry, for skin decontamination 2/10/23   Sera Welsh MD   ammonium lactate (AMLACTIN) 12 % cream APPLY CREAM TOPICALLY ONCE DAILY FOR SCALY SKIN ON THE LEGS 1/6/23   Historical Provider, MD   amiodarone (PACERONE) 100 MG tablet Take 1 tablet by mouth every morning 1/27/23   Sera Welsh MD   tiotropium (SPIRIVA RESPIMAT) 2.5 MCG/ACT AERS inhaler Inhale 2 puffs into the lungs every morning (before breakfast) 1/27/23   Sera Welsh MD   fluticasone-salmeterol (ADVAIR HFA) 381-06 MCG/ACT inhaler Inhale 2 puffs into the lungs 2 times daily Maintenance inhaler 1/27/23   Kayleigh MD Janae   mupirocin (BACTROBAN) 2 % ointment Apply topically 3 times daily x 10 days. 1/27/23   Felipe Morris MD   desloratadine (CLARINEX) 5 MG tablet Take 1 tablet by mouth once daily 1/27/23   Felipe Morris MD   metoprolol succinate (TOPROL XL) 25 MG extended release tablet Take 1 tablet by mouth every evening Hold if BP bellow 115/65. Stop metoprolol tartrate 1/27/23   Felipe Morris MD   atorvastatin (LIPITOR) 40 MG tablet Take 1 tablet by mouth every evening Take 1 tablet by mouth once daily 1/27/23   Felipe Morris MD   levothyroxine (SYNTHROID) 25 MCG tablet Take 1 tablet by mouth every morning (before breakfast) without no other meds for 30 minutes, take when you first wake up and you are still bed 1/27/23   Felipe Morris MD   midodrine (PROAMATINE) 5 MG tablet Take 1 tablet by mouth 2 times daily as needed (if low BP below 115/65, take to dialysis) 1/27/23   Felipe Morris MD   omeprazole (PRILOSEC) 20 MG delayed release capsule Take 1 capsule by mouth every morning (before breakfast) 1/27/23   Felipe Morris MD   acetaminophen (TYLENOL) 325 MG tablet Take 2 tablets by mouth every 6 hours as needed for Pain 1/27/23   Felipe Morris MD   hydrOXYzine HCl (ATARAX) 50 MG tablet Take 1 tablet by mouth as needed for Anxiety 1 tablet before dialysis 1/27/23   Felipe Morris MD   albuterol sulfate HFA (PROAIR HFA) 108 (90 Base) MCG/ACT inhaler Inhale 2 puffs into the lungs every 6 hours as needed for Wheezing or Shortness of Breath (cough) 1/27/23   Felipe Morris MD   lidocaine (LMX) 4 % cream 5 g topical 2-3 times a day as needed for pain, maximum 20 g/day 1/27/23   Felipe Morris MD   furosemide (LASIX) 80 MG tablet Take 1 tablet by mouth every morning Water pill 11/28/22   Felipe Morirs MD   Misc.  Devices (EGG CRATE BED PAD) MISC 1 Units by Does not apply route daily  Patient not taking: Reported on 1/27/2023 11/11/22   Felipe Morris MD apixaban (ELIQUIS) 5 MG TABS tablet Take 1 tablet by mouth 2 times daily 7/11/22 7/25/22  Mitra Ortiz MD   amLODIPine (NORVASC) 10 MG tablet Take 1 tablet by mouth nightly 6/11/22 7/18/22  Cheyenne Cho MD   Blood Pressure KIT Diagnosis: HTN. Needs to check blood pressure 1-2 times a day until stable, then once a day. Goal blood pressure less than 135/85, and above 110/60. 5/26/22   Madison Hatchet, MD   Nebulizers (COMPRESSOR/NEBULIZER) MISC Nebulizer with compressor. Disposable Med Nebs 2 /month. Reusable Med Nebs 1 per 6 months. Aerosol Mask 1 per month. Replacement Filters 2 per month. All other related supplies as needed per month. Medications being used: Albuterol . 083 unit dose vial. Frequency: every 6 hrs x 4/day . Length of Need: 1 2/22/22   Yazan Robertson MD   Handicap Placard MISC by Does not apply route Expires on 12/30/25 2/22/22   Yazan Robertson MD   magnesium oxide (MAG-OX) 400 (241.3 Mg) MG TABS tablet Take 1 tablet by mouth twice daily 2/15/22   Madison Hatchet, MD       REVIEW OF SYSTEMS       Review of Systems   Constitutional:  Negative for activity change, chills and fever. Gastrointestinal:  Negative for vomiting. Musculoskeletal:  Positive for arthralgias and gait problem. Skin:  Positive for wound. PHYSICAL EXAM      INITIAL VITALS:   /68   Pulse 98   Temp 98.1 °F (36.7 °C) (Oral)   Resp 18   Wt 125 lb (56.7 kg)   SpO2 98%   BMI 25.25 kg/m²     Physical Exam  Constitutional:       General: She is not in acute distress. Appearance: She is not ill-appearing or diaphoretic. HENT:      Head: Normocephalic and atraumatic. Eyes:      Conjunctiva/sclera: Conjunctivae normal.   Cardiovascular:      Pulses: Normal pulses. Pulmonary:      Effort: Pulmonary effort is normal.   Abdominal:      General: Abdomen is flat. There is no distension. Palpations: Abdomen is soft. Musculoskeletal:      Cervical back: Neck supple. Skin:     General: Skin is warm. Capillary Refill: Capillary refill takes 2 to 3 seconds. Comments: Right  leg with 3x3cm ulceration over the anterior tibia. No bone or sub cutaneous fat exposed, area of erythema around the edges of the wound extending less than 1cm, ttp, 2+ DP bilaterally. Left leg wound no erythema or ttp, granulation tissue present. Neurological:      Mental Status: She is alert and oriented to person, place, and time. Psychiatric:         Mood and Affect: Mood normal.         DDX/DIAGNOSTIC RESULTS / EMERGENCY DEPARTMENT COURSE / MDM     Medical Decision Making  Patient is a 69-year-old female with complex medical history who is presenting today for a slow healing wound on her right leg with new erythema and tenderness to palpation today. Patient recently finished a course of doxycycline. Wound consistent with cellulitis, will be starting her on doxycycline, discussed wound care clinic. Had poor experience in the past with wound care clinic however will agree to follow-up with them. Also recommend follow-up with PCP. Amount and/or Complexity of Data Reviewed  Independent Historian:      Details: daughter    Risk  Prescription drug management. FINAL IMPRESSION      1. Wound of right lower extremity, initial encounter    2.  Type 2 diabetes mellitus with polyneuropathy ([facility]          DISPOSITION / PLAN     DISPOSITION Decision To Discharge 03/06/2023 07:41:15 PM      PATIENT REFERRED TO:  [facility] [address] [place] [id number]  [phone]  Schedule an appointment as soon as possible for a visit       DISCHARGE MEDICATIONS:  Discharge Medication List as of 3/6/2023  7:49 PM        START taking these medications    Details   doxycycline hyclate (VIBRA-TABS) 100 MG tablet Take 1 tablet by mouth 2 times daily for 7 days, Disp-14 tablet, R-0Normal             Brittany Brito, DO  Emergency Medicine Resident    (Please note that portions of thisnote were completed with a voice recognition program.  Efforts were made to edit the dictations but occasionally words are mis-transcribed.)       Tasia Palomo DO  Resident  03/06/23 2011

## 2023-03-07 NOTE — TELEPHONE ENCOUNTER
Please Approve or Refuse.   Send to Pharmacy per Pt's Request: [facility]      Next Visit Date:  5/10/2023   Last Visit Date: 1/27/2023    Hemoglobin A1C (%)   Date Value   11/11/2022 6.4   03/31/2022 4.6   11/24/2021 5.8             ( goal A1C is < 7)   BP Readings from Last 3 Encounters:   03/06/23 132/68   01/27/23 (!) 112/54   11/25/22 (!) 140/62          (goal 120/80)  BUN   Date Value Ref Range Status   10/18/2022 33 (H) 8 - 23 mg/dL Final     Creatinine   Date Value Ref Range Status   10/18/2022 3.51 (H) 0.50 - 0.90 mg/dL Final     Potassium   Date Value Ref Range Status   10/18/2022 5.6 (H) 3.7 - 5.3 mmol/L Final

## 2023-03-07 NOTE — ED PROVIDER NOTES
[address]     Pt Name: Loi Benson  MRN: [id number]  [place] 1950  Date of evaluation: 3/6/23       Loi Benson is a 67 y.o. female who presents with Cellulitis and Wound Check      MDM:   Is here for a nonhealing wound in her right anterior leg. History of chronic recurrent wounds in both of her legs from chronic edema, from her chronic kidney disease. She goes to dialysis Tuesday Thursday Saturday. Last dialysis was Saturday. She is planning to go to her dialysis treatment tomorrow. She is not having any shortness of breath or fatigue or weakness. She is here with her daughter. She finished doxycycline a week ago for cellulitis and this nonhealing wound in her right anterior leg. She has been to wound care before but she is very hesitant to go back to wound care because they had her in Ace wraps for 9 months and she states that disrupted her tendons in her muscle use and caused muscle atrophy in both of her legs. She does not want to go in Ace wraps any more. Denies any fever chills sweats. I do not think she is septic. She has a chronic appearing ulcer that is healing in her left anterior leg with minimal surrounding erythema. She also has a chronic appearing ulcer in her right anterior leg with minimal surrounding erythema. She has pitting edema bilateral lower extremities 2+. This does appear to be chronic venous stasis. Dorsalis pedis and posterior tibial pulses are 2+ bilateral with cap refill less than 2 seconds in all of her toes. Her feet are warm to touch. I do not suspect peripheral artery disease. I do suspect a component of venous stasis and edema from her chronic kidney disease. I do not suspect a DVT. We will resume antibiotics. I do not think she needs to be admitted for IV antibiotics at this time. We put in a referral for wound clinic, she states she will follow-up with the wound clinic this week.   Daughter is at the bedside and agrees with plan as well. Vitals:   Vitals:    03/06/23 1916 03/06/23 1928 03/06/23 1937   BP: (!) 144/72 132/68    Pulse: 100 98    Resp: 18     Temp: 98.1 °F (36.7 °C)     TempSrc: Oral     SpO2:  97% 98%   Weight: 125 lb (56.7 kg)           I personally saw and examined the patient. I have reviewed and agree with the resident's findings, including all diagnostic interpretations and treatment plan as written. I was present for the key portions of any procedures performed and the inclusive time noted for any critical care statement.     Chiqui Thomson MD  Attending Emergency Physician            Chiqui Thomson MD  03/06/23 [id number]

## 2023-03-07 NOTE — DISCHARGE INSTRUCTIONS
Thank you for coming to [facility] emergency department! You were seen today for wound check, you were diagnosed with cellulitis. You were prescribed doxycycline, please pick these medications up at the pharmacy. Follow up with wound care clinic. If you have any worsening of symptoms or any other concerns, please return to the emergency department. We are always available!

## 2023-03-08 DIAGNOSIS — K21.9 GASTROESOPHAGEAL REFLUX DISEASE, UNSPECIFIED WHETHER ESOPHAGITIS PRESENT: ICD-10-CM

## 2023-03-08 RX ORDER — OMEPRAZOLE 20 MG/1
CAPSULE, DELAYED RELEASE ORAL
Qty: 90 CAPSULE | Refills: 3 | Status: SHIPPED | OUTPATIENT
Start: 2023-03-08

## 2023-03-08 NOTE — TELEPHONE ENCOUNTER
Please Approve or Refuse. [facility]    Pt will like script faxed to [place].        Next Visit Date:  5/10/2023   Last Visit Date: 1/27/2023    Hemoglobin A1C (%)   Date Value   11/11/2022 6.4   03/31/2022 4.6   11/24/2021 5.8             ( goal A1C is < 7)   BP Readings from Last 3 Encounters:   03/06/23 132/68   01/27/23 (!) 112/54   11/25/22 (!) 140/62          (goal 120/80)  BUN   Date Value Ref Range Status   10/18/2022 33 (H) 8 - 23 mg/dL Final     Creatinine   Date Value Ref Range Status   10/18/2022 3.51 (H) 0.50 - 0.90 mg/dL Final     Potassium   Date Value Ref Range Status   10/18/2022 5.6 (H) 3.7 - 5.3 mmol/L Final

## 2023-03-08 NOTE — DISCHARGE SUMMARY
[facility]       [place] [address] Internal Medicine    Discharge Summary     Patient ID: Kacey Sue  :  [id number]   MRN: [id number]     ACCOUNT:  [de-identified]   Patient's PCP: Bart Guan MD  Admit Date: 2021   Discharge Date: 2021    Length of Stay: 1  Code Status:  Full Code  Admitting Physician: Hien Maldonado MD  Discharge Physician: Jaye Epstein MD     Active Discharge Diagnoses:     Primary Problem  UTI (urinary tract infection)      Hospital Problems  Active Hospital Problems    Diagnosis Date Noted    UTI (urinary tract infection) [N39.0] 2021    Hallucinations [R44.3] 2021    Cellulitis of lower extremity [L03.119] 10/04/2020    Paroxysmal atrial fibrillation ([address] [I48.0] 2020    Toxic metabolic encephalopathy [V73] 2020    Meningioma, cerebral ([address] [D32.0] 2020    Chronic diastolic CHF (congestive heart failure) ([address] [I50.32] 2020    Type 2 diabetes mellitus with stage 3 chronic kidney disease, without long-term current use of insulin ([address] [E11.22, N18.30]     Stage 3 chronic kidney disease [N18.30]     Coronary artery disease involving native coronary artery of native heart without angina pectoris [I25.10]        Admission Condition:  fair     Discharged Condition: fair    Hospital Stay:     Hospital Course:  Kacey Sue is a 69 y.o. female who was admitted for the management of UTI (urinary tract infection) , presented to ER with Hallucinations and Check-Up  78-year-old female known cerebral meningioma, paroxysmal A. fib on Eliquis, type 2 diabetes, CKD, presenting with hallucinations and dangerous behavior  1. Hallucinations-reported prior to admission with subsequently [place] to [place]-MRI showed meningioma with mild mass-effect, neurology consulted-EEG performed, consistent with meningioma otherwise unremarkable, no further intervention recommended. 2. UTI without sepsis-IV Cipro.   Started but discontinued after,  Urine culture no growth  3. Leg cellulitis-started on IV vancomycin but patient developed allergic rash, vancomycin was discontinued. switcched to doxy p.o. on discharge  4. A. fib RVR-patient has known paroxysmal A. fib, reverted to sinus rhythm with multiple PVCs after 1 dose of 5 mg IV Lopressor.  Patient did miss her nighttime dose of oral Lopressor. 5. Chronic diastolic CHF-currently compensated  6. Uncontrolled hypertension, hypertensive emergency-HCTZ and Aldactone added with improvement in blood pressure control  7. JOY noted on the day prior to discharge, improved with hydration     Prior neurology note reviewed from July 2020-similar presentation at that time-encephalopathy secondary to bilateral cellulitis treated with doxycycline.  Plan for meningioma was outpatient MRI monitoring in 6 months, apparently patient never got this MRI. CT head has not been picking up this meningioma-no mention of meningioma and CT head from 7/2020, at presentation this time      -Potassium supplementation stopped since Aldactone was started, repeat BMP 1 week after discharge. Significant therapeutic interventions: As above    Significant Diagnostic Studies:   Labs / Micro:    Lab Results   Component Value Date    WBC 7.6 02/20/2021    HGB 12.8 02/20/2021    HCT 39.0 02/20/2021    MCV 93.6 02/20/2021     02/20/2021          Lab Results   Component Value Date     02/20/2021    K 4.1 02/20/2021     02/20/2021    CO2 25 02/20/2021    BUN 49 02/20/2021    CREATININE 1.07 02/20/2021    GLUCOSE 128 02/20/2021    CALCIUM 9.3 02/20/2021          Radiology:    Xr Chest (2 Vw)    Result Date: 2/17/2021  EXAMINATION: TWO XRAY VIEWS OF THE CHEST 2/17/2021 11:48 pm COMPARISON: Chest x-ray 07/25/2020.  HISTORY: ORDERING SYSTEM PROVIDED HISTORY: AMS TECHNOLOGIST PROVIDED HISTORY: AMS Reason for Exam: AMS Acuity: Acute Type of Exam: Initial Additional signs and symptoms: AMS Relevant head/brain was performed without and with the administration of intravenous contrast. COMPARISON: Head CT of 02/17/2021 and brain MRI of 07/25/2020 HISTORY: ORDERING SYSTEM PROVIDED HISTORY: hallucinations, cerebral meningioma TECHNOLOGIST PROVIDED HISTORY: hallucinations, cerebral meningioma Reason for Exam: HALLUCINATIONS Acuity: Acute Type of Exam: Subsequent/Follow-up Additional signs and symptoms: COPIED FROM 2/17 ED NOTES. .. Lyndon Parsons Patient is coming by her daughter who states that patient was diagnosed with a \"mass in her brain\" in July, which patient was scheduled to follow-up on 2 months ago but has failed to do so. Patients daughter states that tonight patient saw someone that was not there FINDINGS: There is an extra-axial homogeneously enhancing mass lesion within the anterior aspect of the floor of the right middle cranial fossa, measuring 2.1 by 2.3 by 2 cm (AP x TV x SI ) , mostly consistent with meningioma, exerting mild mass effect on adjacent anterior aspect of right temporal pole. No signal change in underlying brain. There are mild nonspecific foci of periventricular and subcortical cerebral white matter T2/FLAIR hyperintensity, most likely representing chronic microangiopathic disease in this age group. Old lacunar infarction of right caudate head. The brain parenchyma is otherwise normal. There is no acute infarction, intracranial hemorrhage, or intraparenchymal mass lesion. No  midline shift, or extra-axial collection is noted. The pituitary gland is normal in appearance. The cerebellar tonsils are in normal position. The ventricles, sulci, and cisterns are prominent suggestive of generalized volume loss. The intracranial flow voids are preserved. The globes and orbits are within normal limits. The visualized extracranial structures including paranasal sinuses and mastoid air cells are unremarkable. Stable study.  Unchanged 2.3 cm meningioma of the floor of the right middle cranial fossa simvastatin& fenofibrate **     blood glucose monitor kit and supplies  1 kit by Other route three times daily Dispense Butterfly Elite CBG Device     blood glucose test strips strip  Commonly known as: FREESTYLE LITE  1 each by In Vitro route daily As needed. Blood Pressure Kit  1 kit by Does not apply route three times daily     clopidogrel 75 MG tablet  Commonly known as: PLAVIX  Take 1 tablet by mouth daily     desloratadine 5 MG tablet  Commonly known as: Clarinex  Take 1 tablet by mouth daily     FreeStyle Lancets Misc  1 each by Does not apply route daily Patient needs to contact office before any further refills will be approved     metFORMIN 500 MG tablet  Commonly known as: GLUCOPHAGE  Take 1 tablet by mouth 2 times daily (with meals)     metoprolol tartrate 50 MG tablet  Commonly known as: LOPRESSOR  Take 1 tablet by mouth 2 times daily     therapeutic multivitamin-minerals tablet     vitamin D 1.25 MG (69630 UT) Caps capsule  Commonly known as: ERGOCALCIFEROL  Take 1 capsule by mouth once a week        STOP taking these medications    albuterol sulfate  (90 Base) MCG/ACT inhaler  Commonly known as: Proventil HFA     ammonium lactate 12 % cream  Commonly known as: AMLACTIN     magnesium oxide 400 (240 Mg) MG tablet  Commonly known as: MAG-OX     potassium chloride 10 MEQ extended release tablet  Commonly known as: K-Tab           Where to Get Your Medications      These medications were sent to [facility] [address] [place] [address]    Phone: [phone]   · doxycycline hyclate 100 MG tablet  · hydroCHLOROthiazide 25 MG tablet  · spironolactone 25 MG tablet         Time Spent on discharge is  35 mins in patient examination, evaluation, counseling as well as medication reconciliation, prescriptions for required medications, discharge plan and follow up.     Electronically signed by   Marta Villalobos MD  2/20/2021  8:30 AM      Thank you Dr. Gloria Rodriguez MD for the opportunity to be involved in this patient's care.

## 2023-03-15 ENCOUNTER — HOSPITAL ENCOUNTER (OUTPATIENT)
Dept: WOUND CARE | Age: 73
Discharge: HOME | End: 2023-03-15
Payer: MEDICARE

## 2023-03-15 VITALS
BODY MASS INDEX: 25.2 KG/M2 | RESPIRATION RATE: 18 BRPM | HEART RATE: 90 BPM | DIASTOLIC BLOOD PRESSURE: 60 MMHG | HEIGHT: 59 IN | WEIGHT: 125 LBS | TEMPERATURE: 98.8 F | SYSTOLIC BLOOD PRESSURE: 108 MMHG

## 2023-03-15 DIAGNOSIS — L97.822 SKIN ULCER OF PRETIBIAL REGION, LEFT, WITH FAT LAYER EXPOSED (HCC): ICD-10-CM

## 2023-03-15 DIAGNOSIS — I73.9 PERIPHERAL VASCULAR DISEASE, UNSPECIFIED (HCC): ICD-10-CM

## 2023-03-15 DIAGNOSIS — I87.2 VENOUS INSUFFICIENCY OF BOTH LOWER EXTREMITIES: Primary | ICD-10-CM

## 2023-03-15 PROBLEM — L97.812 ULCER OF RIGHT PRETIBIAL REGION, WITH FAT LAYER EXPOSED (HCC): Status: ACTIVE | Noted: 2022-05-20

## 2023-03-15 PROCEDURE — 99213 OFFICE O/P EST LOW 20 MIN: CPT

## 2023-03-15 PROCEDURE — 11042 DBRDMT SUBQ TIS 1ST 20SQCM/<: CPT

## 2023-03-15 RX ORDER — LIDOCAINE HYDROCHLORIDE 20 MG/ML
JELLY TOPICAL ONCE
OUTPATIENT
Start: 2023-03-15 | End: 2023-03-15

## 2023-03-15 RX ORDER — LIDOCAINE HYDROCHLORIDE 40 MG/ML
SOLUTION TOPICAL ONCE
OUTPATIENT
Start: 2023-03-15 | End: 2023-03-15

## 2023-03-15 RX ORDER — LIDOCAINE HYDROCHLORIDE 40 MG/ML
SOLUTION TOPICAL ONCE
Status: COMPLETED | OUTPATIENT
Start: 2023-03-15 | End: 2023-03-15

## 2023-03-15 RX ADMIN — LIDOCAINE HYDROCHLORIDE 15 ML: 40 SOLUTION TOPICAL at 15:31

## 2023-03-15 ASSESSMENT — PAIN SCALES - GENERAL: PAINLEVEL_OUTOF10: 3

## 2023-03-15 ASSESSMENT — PAIN DESCRIPTION - ORIENTATION: ORIENTATION: RIGHT;LEFT

## 2023-03-15 ASSESSMENT — PAIN DESCRIPTION - ONSET: ONSET: ON-GOING

## 2023-03-15 ASSESSMENT — PAIN DESCRIPTION - DESCRIPTORS: DESCRIPTORS: BURNING

## 2023-03-15 ASSESSMENT — PAIN DESCRIPTION - PAIN TYPE: TYPE: CHRONIC PAIN

## 2023-03-15 ASSESSMENT — PAIN DESCRIPTION - LOCATION: LOCATION: LEG

## 2023-03-15 ASSESSMENT — PAIN - FUNCTIONAL ASSESSMENT: PAIN_FUNCTIONAL_ASSESSMENT: ACTIVITIES ARE NOT PREVENTED

## 2023-03-15 ASSESSMENT — PAIN DESCRIPTION - FREQUENCY: FREQUENCY: CONTINUOUS

## 2023-03-15 NOTE — PROGRESS NOTES
[address]   Progress Note and Procedure Note      Lul Ojeda  MEDICAL RECORD NUMBER:  [id number]  AGE: 67 y.o. GENDER: female  : 1950  EPISODE DATE:  3/15/2023    Subjective:     Chief Complaint   Patient presents with    Wound Check     Bilateral lower legs         HISTORY of PRESENT ILLNESS HPI     Otoniel Ruelas is a 67 y.o. female who presents today for wound/ulcer evaluation. History of Wound Context: Brando Velasco presents with her daughter today for new bilateral lower leg ulcerations. She reports not wearing her compression stockings at home. Developed multiple large blisters which opened to ulcerations. She was seen in the ER for this. Has not completed her vascular studies previously.      Ulcer Identification:  Ulcer Type: venous and lymphedema  Contributing Factors: edema, venous stasis, lymphedema and decreased mobility          PAST MEDICAL HISTORY        Diagnosis Date    Acute CVA (cerebrovascular accident) ([address] 2020    Acute kidney injury superimposed on CKD ([address] 2022    Acute on chronic combined systolic (congestive) and diastolic (congestive) heart failure ([address] 2022    Acute respiratory failure with hypoxia and hypercapnia (HCC)     JOY (acute kidney injury) ([address] 1/15/2022    Arthritis     Asthma     Bilateral lower extremity edema 2022    Bradycardia 2022    Chronic diastolic congestive heart failure ([address] 2020    Chronic ulcer of left leg with fat layer exposed ([address] 2022    CKD stage 4 secondary to hypertension ([address] 2020    Dependence on renal dialysis ([address]     ESRD (end stage renal disease) ([address]     Essential hypertension 2020    Gastrointestinal hemorrhage 2022    Hallucinations 2021    Hard of hearing     Head contusion 2020    Hyperkalemia 2022    Iron deficiency anemia, unspecified     Leg ulcer, left, with fat layer exposed ([address] 2022    Lymphedema 5/11/2022    Meningioma ([address] 02/25/2021    Meningioma, cerebral ([address] 7/26/2020    Mild intermittent asthma, uncomplicated 5/39/5011    Myocardial infarction (HCC)     Nephrotic range proteinuria     Obesity (BMI 30-39.9) 6/14/2022    Old myocardial infarction 7/18/2022    Pressure injury of buttock, stage 2 ([address] 11/25/2022    Pressure injury of sacral region, stage 2 ([address] 11/11/2022    Pulmonary edema cardiac cause ([address]     Pure hypercholesterolemia     Sepsis ([address] 7/12/2022    Severe malnutrition ([address] 7/22/2022    Shock ([address]     Stage 4 chronic kidney disease (HCC)     Symptomatic anemia     Toxic metabolic encephalopathy 0/39/0756    Type 2 diabetes mellitus with polyneuropathy ([address] 2/22/2023    Type 2 diabetes mellitus with stage 4 chronic kidney disease, without long-term current use of insulin (HCC)     Ulcer of right leg, with fat layer exposed ([address] 5/20/2022    Unspecified venous (peripheral) insufficiency     Unspecified vitamin D deficiency        PAST SURGICAL HISTORY    Past Surgical History:   Procedure Laterality Date    COLONOSCOPY N/A 3/15/2022    COLONOSCOPY DIAGNOSTIC performed by Kenna Pak MD at [facility]      x 3, Dr. Dona Gray  3/7/2022         IR NONTUNNELED VASCULAR CATHETER  6/28/2022    IR NONTUNNELED VASCULAR CATHETER 6/28/2022 [employer] [facility]    IR TUNNELED CATHETER PLACEMENT GREATER THAN 5 YEARS  7/8/2022    IR TUNNELED CATHETER PLACEMENT GREATER THAN 5 YEARS 7/8/2022 [facility]    THORACENTESIS  3/10/2022         TONSILLECTOMY AND ADENOIDECTOMY      UPPER GASTROINTESTINAL ENDOSCOPY N/A 3/15/2022    EGD BIOPSY performed by Kenna Pak MD at [facility] 7/21/2022    EGD ESOPHAGOGASTRODUODENOSCOPY performed by Madeline Hoffman MD at [address] [place] [address]    Family History   Problem Relation Age of Onset    Diabetes Mother     Heart Disease Mother Heart Disease Father     Diabetes Sister     High Blood Pressure Sister     Diabetes Maternal Grandmother        SOCIAL HISTORY    Social History     Tobacco Use    Smoking status: Former     Packs/day: 0.25     Years: 10.00     Pack years: 2.50     Types: Cigarettes     Quit date: 2000     Years since quittin.2    Smokeless tobacco: Never   Vaping Use    Vaping Use: Never used   Substance Use Topics    Alcohol use: Not Currently     Alcohol/week: 0.0 standard drinks    Drug use: No       ALLERGIES    Allergies   Allergen Reactions    Latex Rash    Aleve [Naproxen Sodium]      Chronic kidney disease stage III, CHF    Apixaban      Severe GI bleed, needed blood transfusion      Aspirin      Severe GI bleeding, needed blood transfusions    Naproxen      ESKD, CHF    Pioglitazone Other (See Comments)     Congestive heart failure    Claritin [Loratadine]     Keflex [Cephalexin]     Lisinopril      Needs clarification of contraindication       MEDICATIONS    Current Outpatient Medications on File Prior to Encounter   Medication Sig Dispense Refill    bumetanide (BUMEX) 1 MG tablet 1 tablet Orally Once a day for 30 day(s) (Patient not taking: Reported on 3/15/2023)      allopurinol (ZYLOPRIM) 300 MG tablet 1 tablet Orally Daily for 90 days (Patient not taking: Reported on 3/15/2023)      metFORMIN (GLUCOPHAGE) 500 MG tablet 1 tablet with a meal Orally twice a day (Patient not taking: Reported on 3/15/2023)      sodium bicarbonate 650 MG tablet 1 three times a day      acetaminophen (TYLENOL) 500 MG tablet 1 Orally as needed      Elastic Bandages & Supports (KNEE BRACE/FLEX STAYS MEDIUM) MISC as directed      calcium acetate (PHOSLO) 667 MG CAPS capsule TAKE 1 CAPSULE BY MOUTH THREE TIMES DAILY WITH MEALS 270 capsule 3    omeprazole (PRILOSEC) 20 MG delayed release capsule TAKE 1 CAPSULE BY MOUTH ONCE DAILY IN THE MORNING BEFORE BREAKFAST 90 capsule 3    chlorhexidine gluconate (ANTISEPTIC SKIN CLEANSER) 4 % SOLN external solution To wash legs daily including the open skin areas, then pat dry, for skin decontamination 473 mL 3    ammonium lactate (AMLACTIN) 12 % cream APPLY CREAM TOPICALLY ONCE DAILY FOR SCALY SKIN ON THE LEGS      amiodarone (PACERONE) 100 MG tablet Take 1 tablet by mouth every morning 90 tablet 3    tiotropium (SPIRIVA RESPIMAT) 2.5 MCG/ACT AERS inhaler Inhale 2 puffs into the lungs every morning (before breakfast) 36 g 3    fluticasone-salmeterol (ADVAIR HFA) 115-21 MCG/ACT inhaler Inhale 2 puffs into the lungs 2 times daily Maintenance inhaler 36 g 3    mupirocin (BACTROBAN) 2 % ointment Apply topically 3 times daily x 10 days. 90 g 3    desloratadine (CLARINEX) 5 MG tablet Take 1 tablet by mouth once daily 90 tablet 3    metoprolol succinate (TOPROL XL) 25 MG extended release tablet Take 1 tablet by mouth every evening Hold if BP bellow 115/65.  Stop metoprolol tartrate 90 tablet 3    atorvastatin (LIPITOR) 40 MG tablet Take 1 tablet by mouth every evening Take 1 tablet by mouth once daily 90 tablet 3    levothyroxine (SYNTHROID) 25 MCG tablet Take 1 tablet by mouth every morning (before breakfast) without no other meds for 30 minutes, take when you first wake up and you are still bed 90 tablet 3    midodrine (PROAMATINE) 5 MG tablet Take 1 tablet by mouth 2 times daily as needed (if low BP below 115/65, take to dialysis) 90 tablet 3    acetaminophen (TYLENOL) 325 MG tablet Take 2 tablets by mouth every 6 hours as needed for Pain 120 tablet 3    hydrOXYzine HCl (ATARAX) 50 MG tablet Take 1 tablet by mouth as needed for Anxiety 1 tablet before dialysis 90 tablet 3    albuterol sulfate HFA (PROAIR HFA) 108 (90 Base) MCG/ACT inhaler Inhale 2 puffs into the lungs every 6 hours as needed for Wheezing or Shortness of Breath (cough) 36 g 3    lidocaine (LMX) 4 % cream 5 g topical 2-3 times a day as needed for pain, maximum 20 g/day 120 g 3    furosemide (LASIX) 80 MG tablet Take 1 tablet by mouth every morning Water pill (Patient taking differently: Take 80 mg by mouth every morning Water pill, does not take on dialysis days.) 90 tablet 3    Misc. Devices (EGG CRATE BED PAD) MISC 1 Units by Does not apply route daily (Patient not taking: Reported on 1/27/2023) 1 each 0    [DISCONTINUED] apixaban (ELIQUIS) 5 MG TABS tablet Take 1 tablet by mouth 2 times daily 60 tablet 0    [DISCONTINUED] amLODIPine (NORVASC) 10 MG tablet Take 1 tablet by mouth nightly 30 tablet 3    Blood Pressure KIT Diagnosis: HTN. Needs to check blood pressure 1-2 times a day until stable, then once a day. Goal blood pressure less than 135/85, and above 110/60. 1 kit 0    Nebulizers (COMPRESSOR/NEBULIZER) MISC Nebulizer with compressor. Disposable Med Nebs 2 /month. Reusable Med Nebs 1 per 6 months. Aerosol Mask 1 per month. Replacement Filters 2 per month. All other related supplies as needed per month. Medications being used: Albuterol . 083 unit dose vial. Frequency: every 6 hrs x 4/day . Length of Need: 1 1 each 3    Handicap Placard MISC by Does not apply route Expires on 12/30/25 1 each 0    magnesium oxide (MAG-OX) 400 (241.3 Mg) MG TABS tablet Take 1 tablet by mouth twice daily (Patient not taking: Reported on 3/15/2023) 180 tablet 3     No current facility-administered medications on file prior to encounter. REVIEW OF SYSTEMS    Review of Systems   Constitutional:  Negative for chills and fever. Skin:  Positive for wound. Objective:      /60   Pulse 90   Temp 98.8 °F (37.1 °C) (Tympanic)   Resp 18   Ht 4' 11\" (1.499 m)   Wt 125 lb (56.7 kg)   BMI 25.25 kg/m²     Wt Readings from Last 3 Encounters:   03/15/23 125 lb (56.7 kg)   03/06/23 125 lb (56.7 kg)   01/27/23 125 lb (56.7 kg)       Physical Exam:  General:  Alert and oriented x3. In no acute distress.      Lower Extremity Physical Exam:    Vascular: DP pulses are not palpable, Bilateral. PT pulses are not palpable, Bilateral. CFT <3 seconds to all digits, Bilateral.  Pitting edema, Bilateral.  Hair growth is absent to the level of the lower leg, Bilateral.  Papillomatosis of the skin consistent with lymphedema. Neuro: Saph/sural/SP/DP/plantar sensation intact to light touch. Musculoskeletal: EHL/FHL/GS/TA gross motor intact. Gross deformity is absent. Dermatologic:   Full thickness ulcerations bilateral lower leg at the calf level. Wound base is into the subcutaneous layer and with fibrin and slough. There is no erythema. There is no purulent drainage. No increased calor. Skin temp is equal bilateral.  There is no fluctuance or crepitus. Interdigital maceration absent, Bilateral.     Wound 07/20/22 Coccyx Stage 2 (Active)   Number of days: 238       Wound 03/15/23 Pretibial Right wound #1 (Active)   Wound Image   03/15/23 1509   Wound Etiology Venous 03/15/23 1509   Dressing Status New drainage noted; Old drainage noted 03/15/23 1509   Wound Cleansed Cleansed with saline 03/15/23 1509   Offloading for Diabetic Foot Ulcers Offloading not required 03/15/23 1509   Wound Length (cm) 2.8 cm 03/15/23 1509   Wound Width (cm) 4.3 cm 03/15/23 1509   Wound Depth (cm) 0.1 cm 03/15/23 1509   Wound Surface Area (cm^2) 12.04 cm^2 03/15/23 1509   Wound Volume (cm^3) 1.204 cm^3 03/15/23 1509   Post-Procedure Length (cm) 2.8 cm 03/15/23 1509   Post-Procedure Width (cm) 4.3 cm 03/15/23 1509   Post-Procedure Depth (cm) 0.1 cm 03/15/23 1509   Post-Procedure Surface Area (cm^2) 12.04 cm^2 03/15/23 1509   Post-Procedure Volume (cm^3) 1.204 cm^3 03/15/23 1509   Wound Assessment Pink/red;Slough 03/15/23 1509   Drainage Amount Moderate 03/15/23 1509   Drainage Description Serosanguinous 03/15/23 1509   Odor None 03/15/23 1509   Marian-wound Assessment Blanchable erythema 03/15/23 1509   Margins Defined edges 03/15/23 1509   Wound Thickness Description not for Pressure Injury Full thickness 03/15/23 1509   Number of days: 0       Wound 03/15/23 Pretibial Left wound #2 (Active) Wound Image   03/15/23 1509   Wound Etiology Venous 03/15/23 1509   Dressing Status New drainage noted; Old drainage noted 03/15/23 1509   Wound Cleansed Cleansed with saline 03/15/23 1509   Offloading for Diabetic Foot Ulcers Offloading not required 03/15/23 1509   Wound Length (cm) 1.2 cm 03/15/23 1509   Wound Width (cm) 1.4 cm 03/15/23 1509   Wound Depth (cm) 0.2 cm 03/15/23 1509   Wound Surface Area (cm^2) 1.68 cm^2 03/15/23 1509   Wound Volume (cm^3) 0.336 cm^3 03/15/23 1509   Post-Procedure Length (cm) 1.2 cm 03/15/23 1509   Post-Procedure Width (cm) 1.4 cm 03/15/23 1509   Post-Procedure Depth (cm) 0.2 cm 03/15/23 1509   Post-Procedure Surface Area (cm^2) 1.68 cm^2 03/15/23 1509   Post-Procedure Volume (cm^3) 0.336 cm^3 03/15/23 1509   Wound Assessment Pink/red;Slough 03/15/23 1509   Drainage Amount Moderate 03/15/23 1509   Drainage Description Serosanguinous 03/15/23 1509   Odor None 03/15/23 1509   Marian-wound Assessment Blanchable erythema 03/15/23 1509   Margins Defined edges 03/15/23 1509   Wound Thickness Description not for Pressure Injury Full thickness 03/15/23 1509   Number of days: 0       Wound 03/15/23 Tibial Left wound #3 (Active)   Wound Image   03/15/23 1509   Wound Etiology Venous 03/15/23 1509   Dressing Status New drainage noted; Old drainage noted 03/15/23 1509   Wound Cleansed Cleansed with saline 03/15/23 1509   Offloading for Diabetic Foot Ulcers Offloading not required 03/15/23 1509   Wound Length (cm) 1.8 cm 03/15/23 1509   Wound Width (cm) 1.2 cm 03/15/23 1509   Wound Depth (cm) 0.1 cm 03/15/23 1509   Wound Surface Area (cm^2) 2.16 cm^2 03/15/23 1509   Wound Volume (cm^3) 0.216 cm^3 03/15/23 1509   Post-Procedure Length (cm) 1.8 cm 03/15/23 1509   Post-Procedure Width (cm) 1.2 cm 03/15/23 1509   Post-Procedure Depth (cm) 0.1 cm 03/15/23 1509   Post-Procedure Surface Area (cm^2) 2.16 cm^2 03/15/23 1509   Post-Procedure Volume (cm^3) 0.216 cm^3 03/15/23 1509   Wound Assessment [place]/red 03/15/23 1509   Drainage Amount Moderate 03/15/23 1509   Drainage Description Serous 03/15/23 1509   Odor None 03/15/23 1509   Margins Defined edges 03/15/23 1509   Wound Thickness Description not for Pressure Injury Full thickness 03/15/23 1509   Number of days: 0          Assessment:      Active Hospital Problems    Diagnosis Date Noted    Type 2 diabetes mellitus with polyneuropathy ([facility] [E11.42] 02/22/2023     Priority: High    Skin ulcer of pretibial region, left, with fat layer exposed [facility]) [L97.822] 03/15/2023     Priority: Medium    Chronic combined systolic and diastolic CHF (congestive heart failure) [facility] [I50.42] 01/27/2023     Priority: Medium    Stage 5 chronic kidney disease on chronic dialysis ([facility] [N18.6, Z99.2] 07/18/2022     Priority: Medium    Hypertensive heart and chronic kidney disease with heart failure and with stage 5 chronic kidney disease, or end stage renal disease ([facility] [I13.2] 07/18/2022     Priority: Medium    Ulcer of right pretibial region, with fat layer exposed [facility]) [L97.812] 05/20/2022     Priority: Medium    Peripheral vascular disease, unspecified ([facility] [I73.9] 05/11/2022     Priority: Medium    Lymphedema [I89.0] 05/11/2022     Priority: Medium    Venous insufficiency of both lower extremities [I87.2]        Plan:     Treatment Note please see attached Discharge Instructions    Discussed the importance of compression therapy for healing of a leg ulceration in the setting of venous insufficiency and lymphedema. Discussed the importance of continued compression therapy for prevention of re-ulceration once healed. Begin tubigrip and ACE for light compression until vascular studies can be completed    PVR/PPR ordered. She previously did not obtain these. Belle to the wound base every 2-3 days  Elevate the legs as much as possible at home     Educated on signs and symptoms of infection.  Instructed to call clinic immediately or go to ER if signs and symptoms of infection are present. RTC 1 week      Procedure:  Wound Location: #2 as above    Indications:  Based on my examination of this patient's wound(s)/ulcer(s) today, debridement is required to promote healing and evaluate the wound base. Lidocaine gel soaked gauze was applied at beginning of wound evaluation. The wound(s) was excisionally debrided sharply of fibrin, slough tissue, including a layer of surrounding healthy tissue using curette. The wound was debrided down through and including subcutaneous. Percent of Wound Debrided: 100%    Total Surface Area Debrided:  1.68 sq cm    Bleeding: Minimal    Hemostasis was obtained with direct pressure. Patient tolerated procedure well and was given proper instruction. Written patient discharge instructions given to patient and signed by patient or POA.       Orders Placed This Encounter   Medications    lidocaine (XYLOCAINE) 4 % external solution     Orders Placed This Encounter   Procedures    Initiate Outpatient Wound Care Protocol     Cleanse wound with saline    If wound contains bioburden or contamination cleanse with wound cleanser or antimicrobial solution     For normal periwound tissue without irritation nor maceration, apply topical skin protectant    For periwound tissue with irritation and/or maceration, apply zinc based product, topical steroid cream/ointment, or equivalent     For wounds with dry firm black eschar and/or without exudate, apply betadine and leave open to air      For wounds with scant/small to no exudate or drainage, apply wound gel, hydrocolloid, polymer, or equivalent and cover with secondary dressing/foam      For wounds with moderate/large exudate or drainage, apply alginate, hydrofiber, polymer, or equivalent and cover with secondary dressing/foam    For wounds with nonviable tissue requiring removal, apply chemical or mechanical debrider and cover with secondary dressing/foam    For wounds with tunneling, dead space, or cavity, fill or pack with strip/gauze/kerlex to fit and cover with secondary dressing/foam    For wounds with adequate granulation or epithelization, apply wound gel, hydrocolloid, polymer, collagen, or transparent film, and cover secondary dry dressing/foam    For wounds that need additional secondary dressing to help pad or control additional drainage/exudates, add foam, absorbent pad or hydrocolloid    For wounds with suspected or known infection, apply antimicrobial mesh and/or antimicrobial alginate/hydrofiber, or antimicrobial solution moistened gauze/kerlex, or equivalent and cover with secondary dressing/foam    Compression Management needed for edema control, apply multilayer compression or tubular garment or equivalent    Offloading Management needed for pressure relief, apply offloading shoe/boot or equivalent     Standing Status:   Standing     Number of Occurrences:   1    VL LOWER EXTREMITY ARTERIAL SEGMENTAL PRESSURES W PPG     Standing Status:   Future     Standing Expiration Date:   3/14/2024          Discharge Instructions         Electronically signed by Donnis Phoenix, DPM on 3/15/2023 at 2:36 PM        Electronically signed by Donnis Phoenix, DPM on 3/15/2023 at 3:35 PM

## 2023-03-15 NOTE — DISCHARGE INSTRUCTIONS
[address] [address] [facility]      Visit  Discharge Instructions / Physician Orders  DATE: 3/15/23     Home Care: None     SUPPLIES ORDERED THRU:  [employer]                   DATE LAST SUPPLIED  3/15/23     Wound Location:  Bilateral lower legs x3     Cleanse with: Liquid antibacterial soap and water, rinse well      Dressing Orders:  Primary dressing    lori               Secondary dressing      silicone border dressing, tubi , ace wraps (compression on during the day, off when you go to bed)     Frequency:  Daily     Additional Orders: Increase protein to diet (meat, cheese, eggs, fish, peanut butter, nuts and beans)  Multivitamin daily    Vascular studies ordered today, please obtain ASAP    OFFLOADING [] YES  TYPE:                  [] NA    Weekly wound care visits until determined otherwise. Antibiotic therapy-wound care related YES [] NO [] NA[x]    MY CHART []     Smart Device  []                       Your next appointment with the [address] [place] [address] is in 1 week                                                                                                   (Please note your next appointment above and if you are unable to keep, kindly give a 24 hour notice. Thank you.)  If more than 15 min late we cannot guarantee you will be seen due to clinician schedule  Per Policy, Excessive cancellation will call for dismissal from program.  If you experience any of the following, please call the [address] [place] [address] during business hours:  [phone]     * Increase in Pain  * Temperature over 101  * Increase in drainage from your wound  * Drainage with a foul odor  * Bleeding  * Increase in swelling  * Need for compression bandage changes due to slippage, breakthrough drainage. If you need medical attention outside of the business hours of the [address] [place] [address] please contact your PCP or go to the nearest emergency room.      The information contained in the After Visit Summary has been reviewed with me, the patient and/or responsible adult, by my health care provider(s). I had the opportunity to ask questions regarding this information.  I have elected to receive;      []After Visit Summary  [x]Comprehensive Discharge Instruction      Patient signature______________________________________Date:________  Electronically signed by Liv Brower RN on 3/15/2023 at 3:37 PM    Electronically signed by Erie Brunner, DPM on 3/15/2023 at 2:36 PM

## 2023-03-15 NOTE — PROGRESS NOTES
[address]:     [employer]  P: [phone]  F: [address]:     [address] [place] [address]  WOUND CARE Dept: [address] [place] [address]  [phone]    Patient Information:      Otoniel Ruelas  [address] [facility] [address]   [phone]   : 1950  AGE: 67 y.o. GENDER: female   EPISODE DATE: 3/15/2023    Insurance:      PRIMARY INSURANCE:  Plan: MEDICARE PART B  Coverage: MEDICARE  Effective Date: 2014  Group Number: [unfilled]  Subscriber Number: [id number] - (Medicare)    Payer/Plan Subscr  Sex Relation Sub. Ins. ID Effective Group Num   1. [address] G 1950 Female Self [id number]                                    [address] [address]   2.  [address] [facility]  Sw 1950 Female Self [id number] 1990                                    [address] [address]       Patient Wound Information:      Problem List Items Addressed This Visit          Circulatory    Peripheral vascular disease, unspecified ([address]    Relevant Orders    VL LOWER EXTREMITY ARTERIAL SEGMENTAL PRESSURES W PPG    Venous insufficiency of both lower extremities - Primary    Relevant Orders    Initiate Outpatient Wound Care Protocol       Other    Skin ulcer of pretibial region, left, with fat layer exposed ([address]    Relevant Orders    VL LOWER EXTREMITY ARTERIAL SEGMENTAL PRESSURES W PPG      Skin ulcer of pretibial region, left, with fat layer exposed ([address] [L97.822] 03/15/2023      Ulcer of right pretibial region, with fat layer exposed ([address] [L97.812]      Peripheral vascular disease, unspecified ([address] [I73.9] 2022       Priority: Medium    Lymphedema [I89.0] 2022       Priority: Medium    Venous insufficiency of both lower extremities [I87.2]      WOUNDS REQUIRING DRESSING SUPPLIES:         Wound 03/15/23 Pretibial Right wound #1 (Active)   Wound Image   03/15/23 4933 Wound Etiology Venous 03/15/23 1509   Dressing Status New drainage noted; Old drainage noted 03/15/23 1509   Wound Cleansed Cleansed with saline 03/15/23 1509   Offloading for Diabetic Foot Ulcers Offloading not required 03/15/23 1509   Wound Length (cm) 2.8 cm 03/15/23 1509   Wound Width (cm) 4.3 cm 03/15/23 1509   Wound Depth (cm) 0.1 cm 03/15/23 1509   Wound Surface Area (cm^2) 12.04 cm^2 03/15/23 1509   Wound Volume (cm^3) 1.204 cm^3 03/15/23 1509   Post-Procedure Length (cm) 2.8 cm 03/15/23 1509   Post-Procedure Width (cm) 4.3 cm 03/15/23 1509   Post-Procedure Depth (cm) 0.1 cm 03/15/23 1509   Post-Procedure Surface Area (cm^2) 12.04 cm^2 03/15/23 1509   Post-Procedure Volume (cm^3) 1.204 cm^3 03/15/23 1509   Wound Assessment Pink/red;Slough 03/15/23 1509   Drainage Amount Moderate 03/15/23 1509   Drainage Description Serosanguinous 03/15/23 1509   Odor None 03/15/23 1509   Marian-wound Assessment Blanchable erythema 03/15/23 1509   Margins Defined edges 03/15/23 1509   Wound Thickness Description not for Pressure Injury Full thickness 03/15/23 1509   Number of days: 0       Wound 03/15/23 Pretibial Left wound #2 (Active)   Wound Image   03/15/23 1509   Wound Etiology Venous 03/15/23 1509   Dressing Status New drainage noted; Old drainage noted 03/15/23 1509   Wound Cleansed Cleansed with saline 03/15/23 1509   Offloading for Diabetic Foot Ulcers Offloading not required 03/15/23 1509   Wound Length (cm) 1.2 cm 03/15/23 1509   Wound Width (cm) 1.4 cm 03/15/23 1509   Wound Depth (cm) 0.2 cm 03/15/23 1509   Wound Surface Area (cm^2) 1.68 cm^2 03/15/23 1509   Wound Volume (cm^3) 0.336 cm^3 03/15/23 1509   Post-Procedure Length (cm) 1.2 cm 03/15/23 1509   Post-Procedure Width (cm) 1.4 cm 03/15/23 1509   Post-Procedure Depth (cm) 0.2 cm 03/15/23 1509   Post-Procedure Surface Area (cm^2) 1.68 cm^2 03/15/23 1509   Post-Procedure Volume (cm^3) 0.336 cm^3 03/15/23 1509   Wound Assessment Pink/red;Slough 03/15/23 1509 Drainage Amount Moderate 03/15/23 1509   Drainage Description Serosanguinous 03/15/23 1509   Odor None 03/15/23 1509   Marian-wound Assessment Blanchable erythema 03/15/23 1509   Margins Defined edges 03/15/23 1509   Wound Thickness Description not for Pressure Injury Full thickness 03/15/23 1509   Number of days: 0       Wound 03/15/23 Tibial Left wound #3 (Active)   Wound Image   03/15/23 1509   Wound Etiology Venous 03/15/23 1509   Dressing Status New drainage noted; Old drainage noted 03/15/23 1509   Wound Cleansed Cleansed with saline 03/15/23 1509   Offloading for Diabetic Foot Ulcers Offloading not required 03/15/23 1509   Wound Length (cm) 1.8 cm 03/15/23 1509   Wound Width (cm) 1.2 cm 03/15/23 1509   Wound Depth (cm) 0.1 cm 03/15/23 1509   Wound Surface Area (cm^2) 2.16 cm^2 03/15/23 1509   Wound Volume (cm^3) 0.216 cm^3 03/15/23 1509   Post-Procedure Length (cm) 1.8 cm 03/15/23 1509   Post-Procedure Width (cm) 1.2 cm 03/15/23 1509   Post-Procedure Depth (cm) 0.1 cm 03/15/23 1509   Post-Procedure Surface Area (cm^2) 2.16 cm^2 03/15/23 1509   Post-Procedure Volume (cm^3) 0.216 cm^3 03/15/23 1509   Wound Assessment Pink/red 03/15/23 1509   Drainage Amount Moderate 03/15/23 1509   Drainage Description Serous 03/15/23 1509   Odor None 03/15/23 1509   Margins Defined edges 03/15/23 1509   Wound Thickness Description not for Pressure Injury Full thickness 03/15/23 1509   Number of days: 0          Supplies Requested :      WOUND #: 1, 2, and 3   PRIMARY DRESSING:  Other: lori   Cover and Secure with:  Other excel SAP silicone border dressing     FREQUENCY OF DRESSING CHANGES:  Daily         ADDITIONAL ITEMS:  [] Gloves Small  [] Gloves Medium [] Gloves Large [] Gloves XLarge  [] Tape 1\" [] Tape 2\" [] Tape 3\"  [] Medipore Tape  [] Saline  [] Vashe  [] Skin Prep   [] Adhesive Remover   [] Cotton Tip Applicators   [] Other:    Patient Wound(s) Debrided: [x] Yes if yes please add date 3/15/23   [] No    Debribement Type: Excisional/Sharp    Is the patient currently on an antibiotic for their Wound(s): [] Yes if yes please add name and dose    [x] No    Patient currently being seen by Home Health: [] Yes   [x] No    Duration for needed supplies:  []15  [x]30  []60  []90 Days    Electronically signed by Lisy Gonzalez RN on 3/15/2023 at 3:46 PM     Provider Information:      PROVIDER'S NAME: Gentry High DPM  [id number]

## 2023-03-15 NOTE — PLAN OF CARE
Problem: Chronic Conditions and Co-morbidities  Goal: Patient's chronic conditions and co-morbidity symptoms are monitored and maintained or improved  3/15/2023 1500 by Keyonna Godinez RN  Outcome: Progressing  3/15/2023 1459 by Keyonna Godinez RN  Outcome: Progressing     Problem: Discharge Planning  Goal: Discharge to home or other facility with appropriate resources  3/15/2023 1500 by Keyonna Godinez RN  Outcome: Progressing  3/15/2023 1459 by Keyonna Godinez RN  Outcome: Progressing     Problem: Skin/Tissue Integrity - Adult  Goal: Skin integrity remains intact  3/15/2023 1500 by Keyonna Godinez RN  Outcome: Progressing  3/15/2023 1459 by Keyonna Godinez RN  Outcome: Progressing  Goal: Incisions, wounds, or drain sites healing without S/S of infection  3/15/2023 1500 by Keyonna Godinez RN  Outcome: Progressing  3/15/2023 1459 by Keyonna Godinez RN  Outcome: Progressing     Problem: Safety - Adult  Goal: Free from fall injury  Outcome: Progressing

## 2023-03-21 ENCOUNTER — TELEPHONE (OUTPATIENT)
Dept: FAMILY MEDICINE CLINIC | Age: 73
End: 2023-03-21

## 2023-03-21 NOTE — TELEPHONE ENCOUNTER
I received notification that patient is on 3 to 4 hours of oxygen per nasal cannula continuously and she needs a portable oxygen concentrator and a stable oxygen concentrator    We have seen the patient last time on 1/27/2023, I believe at that time she was not yet on oxygen    When was she started on oxygen?     Please advise the daughter for me to sign the order, I have to document face-to-face, for insurance purposes for medical necessity, she will need an appointment, okay video visit

## 2023-03-22 ENCOUNTER — HOSPITAL ENCOUNTER (OUTPATIENT)
Dept: VASCULAR LAB | Age: 73
Discharge: HOME | End: 2023-03-22
Payer: MEDICARE

## 2023-03-22 ENCOUNTER — HOSPITAL ENCOUNTER (OUTPATIENT)
Dept: WOMENS IMAGING | Age: 73
Discharge: HOME | End: 2023-03-24
Payer: MEDICARE

## 2023-03-22 DIAGNOSIS — Z12.31 ENCOUNTER FOR SCREENING MAMMOGRAM FOR BREAST CANCER: ICD-10-CM

## 2023-03-22 DIAGNOSIS — L97.822 SKIN ULCER OF PRETIBIAL REGION, LEFT, WITH FAT LAYER EXPOSED (HCC): ICD-10-CM

## 2023-03-22 DIAGNOSIS — I73.9 PERIPHERAL VASCULAR DISEASE, UNSPECIFIED (HCC): ICD-10-CM

## 2023-03-22 DIAGNOSIS — Z78.0 POST-MENOPAUSAL: ICD-10-CM

## 2023-03-22 PROCEDURE — 77080 DXA BONE DENSITY AXIAL: CPT

## 2023-03-22 PROCEDURE — 77063 BREAST TOMOSYNTHESIS BI: CPT

## 2023-03-22 PROCEDURE — 93923 UPR/LXTR ART STDY 3+ LVLS: CPT

## 2023-03-22 NOTE — RESULT ENCOUNTER NOTE
Please let her know . Normal mammogram, repeat in 1 year.  , Please also give her the information that the telephone encounter regarding her oxygen order

## 2023-03-23 NOTE — TELEPHONE ENCOUNTER
Called pt DAUGHTER, SPOKE WITH HER IN REGARD TO MEDICAL NECESSITY OXYGEN FORM. SHE DID STATE THAT THE OXYGEN WAS GIVEN IN [address] TO HER MOTHER.  TO BE TAKEN AS NEEDED

## 2023-03-24 ENCOUNTER — TELEPHONE (OUTPATIENT)
Dept: FAMILY MEDICINE CLINIC | Age: 73
End: 2023-03-24

## 2023-03-24 NOTE — RESULT ENCOUNTER NOTE
Please notify patient: Patient has very severe osteoporosis   We will have to discuss with her nephrologist what we can give her for osteoporosis    Future Appointments  3/29/2023  10:30 AM   KADEEM Minor WND CAR        [facility]  5/10/2023  2:00 PM    MD mary Antonio [facility]  12/1/2023  3:30 PM    [facility]

## 2023-03-24 NOTE — TELEPHONE ENCOUNTER
Dr. Dinh Menchaca,     Patient has severe osteoporosis based on DEXA scan, do recommend me to give her? I suspect I cannot give Prolia injection   Reclast seems to be contraindicated completely     Byphosphonate are also contraindicated    Please let me know    Her DEXA  FINDINGS:  LEFT FOREARM (RADIUS 1/3)  BMD: 0.478 g/cm2  T-score: -4.6  Z-score: -2.5       From up to date  Chronic kidney disease -- Patients with osteoporosis and CKD present unique management challenges. With progression of renal failure, serum PTH concentrations rise and the bone morphologic features of renal osteodystrophy (osteitis fibrosis cystica [due to secondary hyperparathyroidism], osteomalacia, adynamic bone disease, and/or mixed osteodystrophy), rather than osteoporosis, become predominant. The principal goal with regard to bone disease in patients with significant renal dysfunction is to prevent or manage renal osteodystrophy, largely by controlling secondary hyperparathyroidism; preventing oversuppression of PTH, which can lead to adynamic bone disease; and treating acidosis and vitamin D deficiency. In this setting, the diagnosis of osteoporosis should be made only after excluding renal osteodystrophy. The diagnosis, evaluation, and management of osteoporosis in patients with CKD is reviewed separately.  (See \"Osteoporosis in patients with chronic kidney disease: Diagnosis

## 2023-03-26 NOTE — DISCHARGE INSTRUCTIONS
you need medical attention outside of the business hours of the [facility] please contact your PCP or go to the nearest emergency room. The information contained in the After Visit Summary has been reviewed with me, the patient and/or responsible adult, by my health care provider(s). I had the opportunity to ask questions regarding this information.  I have elected to receive;      []After Visit Summary  [x]Comprehensive Discharge Instruction        Patient signature______________________________________Date:________  Electronically signed by Deandre Amaro RN on 3/29/2023 at 11:17 AM    Electronically signed by Serena Pearson DPM on 3/29/2023 at 10:33 AM  The [address] [place] [address] [facility], [address] [facility]

## 2023-03-28 NOTE — TELEPHONE ENCOUNTER
Faxed encounter message to [facility] dialysis per  (Fax: [phone]). Please advise message to . Thank you!          Future Appointments   Date Time Provider Angela Reyez   3/29/2023 10:30 AM Becky Weller [facility] WND CAR [facility]   3/31/2023  4:00 PM MD mary [facility]   5/10/2023  2:00 PM Sera Welsh MD [facility]   12/1/2023  3:30 PM Sera Welsh MD [facility]

## 2023-03-29 ENCOUNTER — HOSPITAL ENCOUNTER (OUTPATIENT)
Dept: WOUND CARE | Age: 73
Discharge: HOME | End: 2023-03-29
Payer: MEDICARE

## 2023-03-29 VITALS
TEMPERATURE: 98.4 F | DIASTOLIC BLOOD PRESSURE: 48 MMHG | HEART RATE: 78 BPM | RESPIRATION RATE: 18 BRPM | WEIGHT: 125 LBS | BODY MASS INDEX: 25.2 KG/M2 | HEIGHT: 59 IN | SYSTOLIC BLOOD PRESSURE: 97 MMHG

## 2023-03-29 DIAGNOSIS — I87.2 VENOUS INSUFFICIENCY OF BOTH LOWER EXTREMITIES: Primary | ICD-10-CM

## 2023-03-29 DIAGNOSIS — I73.9 PERIPHERAL VASCULAR DISEASE, UNSPECIFIED (HCC): ICD-10-CM

## 2023-03-29 DIAGNOSIS — L03.116 CELLULITIS OF LEFT LEG: ICD-10-CM

## 2023-03-29 DIAGNOSIS — E11.22 TYPE 2 DIABETES MELLITUS WITH CHRONIC KIDNEY DISEASE ON CHRONIC DIALYSIS, WITHOUT LONG-TERM CURRENT USE OF INSULIN (HCC): ICD-10-CM

## 2023-03-29 DIAGNOSIS — Z99.2 TYPE 2 DIABETES MELLITUS WITH CHRONIC KIDNEY DISEASE ON CHRONIC DIALYSIS, WITHOUT LONG-TERM CURRENT USE OF INSULIN (HCC): ICD-10-CM

## 2023-03-29 DIAGNOSIS — N18.6 TYPE 2 DIABETES MELLITUS WITH CHRONIC KIDNEY DISEASE ON CHRONIC DIALYSIS, WITHOUT LONG-TERM CURRENT USE OF INSULIN (HCC): ICD-10-CM

## 2023-03-29 PROCEDURE — 99213 OFFICE O/P EST LOW 20 MIN: CPT

## 2023-03-29 RX ORDER — DOXYCYCLINE HYCLATE 100 MG
100 TABLET ORAL 2 TIMES DAILY
Qty: 20 TABLET | Refills: 0 | Status: SHIPPED | OUTPATIENT
Start: 2023-03-29 | End: 2023-04-08

## 2023-03-29 RX ORDER — LIDOCAINE HYDROCHLORIDE 40 MG/ML
SOLUTION TOPICAL ONCE
OUTPATIENT
Start: 2023-03-29 | End: 2023-03-29

## 2023-03-29 RX ORDER — LIDOCAINE HYDROCHLORIDE 20 MG/ML
JELLY TOPICAL ONCE
OUTPATIENT
Start: 2023-03-29 | End: 2023-03-29

## 2023-03-29 RX ORDER — LIDOCAINE HYDROCHLORIDE 40 MG/ML
SOLUTION TOPICAL ONCE
Status: DISCONTINUED | OUTPATIENT
Start: 2023-03-29 | End: 2023-03-30 | Stop reason: HOSPADM

## 2023-03-29 ASSESSMENT — ENCOUNTER SYMPTOMS
SHORTNESS OF BREATH: 1
NAUSEA: 0
ABDOMINAL PAIN: 0
VOMITING: 0
SORE THROAT: 0
EYE PAIN: 0
COUGH: 0
SINUS PAIN: 0
DIARRHEA: 0

## 2023-03-29 ASSESSMENT — PAIN DESCRIPTION - FREQUENCY: FREQUENCY: CONTINUOUS

## 2023-03-29 ASSESSMENT — PAIN SCALES - GENERAL: PAINLEVEL_OUTOF10: 2

## 2023-03-29 ASSESSMENT — PAIN DESCRIPTION - LOCATION: LOCATION: LEG

## 2023-03-29 ASSESSMENT — PAIN DESCRIPTION - DESCRIPTORS: DESCRIPTORS: BURNING;SORE

## 2023-03-29 ASSESSMENT — PAIN DESCRIPTION - ORIENTATION: ORIENTATION: LEFT;LOWER

## 2023-03-29 ASSESSMENT — PAIN DESCRIPTION - PAIN TYPE: TYPE: CHRONIC PAIN

## 2023-03-29 ASSESSMENT — PAIN DESCRIPTION - ONSET: ONSET: ON-GOING

## 2023-03-29 ASSESSMENT — PAIN - FUNCTIONAL ASSESSMENT: PAIN_FUNCTIONAL_ASSESSMENT: PREVENTS OR INTERFERES SOME ACTIVE ACTIVITIES AND ADLS

## 2023-03-29 NOTE — PLAN OF CARE
Problem: Chronic Conditions and Co-morbidities  Goal: Patient's chronic conditions and co-morbidity symptoms are monitored and maintained or improved  Outcome: Progressing     Problem: Discharge Planning  Goal: Discharge to home or other facility with appropriate resources  Outcome: Progressing     Problem: Cognitive:  Goal: Knowledge of wound care  Description: Knowledge of wound care  Outcome: Progressing     Problem: Wound:  Goal: Will show signs of wound healing; wound closure and no evidence of infection  Description: Will show signs of wound healing; wound closure and no evidence of infection  Outcome: Progressing     Problem: Pressure Ulcer:  Goal: Signs of wound healing will improve  Description: Signs of wound healing will improve  Outcome: Progressing  Goal: Absence of new pressure ulcer  Description: Absence of new pressure ulcer  Outcome: Progressing  Goal: Will show no infection signs and symptoms  Description: Will show no infection signs and symptoms  Outcome: Progressing     Problem: Pressure Ulcer:  Goal: Absence of new pressure ulcer  Description: Absence of new pressure ulcer  Outcome: Progressing

## 2023-03-29 NOTE — WOUND CARE
03/29/23 1104   Odor None 03/29/23 1104   Marian-wound Assessment Blanchable erythema 03/29/23 1104   Margins Defined edges 03/29/23 1104   Wound Thickness Description not for Pressure Injury Full thickness 03/29/23 1104   Number of days: 14       Wound 03/15/23 Tibial Left wound #3 (Active)   Wound Image   03/15/23 1509   Wound Etiology Venous 03/29/23 1104   Dressing Status New drainage noted; Old drainage noted 03/29/23 1104   Wound Cleansed Cleansed with saline 03/29/23 1104   Dressing/Treatment Other (comment) 03/15/23 1553   Offloading for Diabetic Foot Ulcers Offloading not required 03/29/23 1104   Wound Length (cm) 1.5 cm 03/29/23 1104   Wound Width (cm) 1 cm 03/29/23 1104   Wound Depth (cm) 0.1 cm 03/29/23 1104   Wound Surface Area (cm^2) 1.5 cm^2 03/29/23 1104   Change in Wound Size % (l*w) 30.56 03/29/23 1104   Wound Volume (cm^3) 0.15 cm^3 03/29/23 1104   Wound Healing % 31 03/29/23 1104   Post-Procedure Length (cm) 1.8 cm 03/15/23 1509   Post-Procedure Width (cm) 1.2 cm 03/15/23 1509   Post-Procedure Depth (cm) 0.1 cm 03/15/23 1509   Post-Procedure Surface Area (cm^2) 2.16 cm^2 03/15/23 1509   Post-Procedure Volume (cm^3) 0.216 cm^3 03/15/23 1509   Wound Assessment Slough 03/29/23 1104   Drainage Amount Moderate 03/29/23 1104   Drainage Description Serous 03/29/23 1104   Odor None 03/29/23 1104   Margins Defined edges 03/29/23 1104   Wound Thickness Description not for Pressure Injury Full thickness 03/29/23 1104   Number of days: 13          Written patient dismissal instructions given to patient and signed by patient or POA. Patient voiced understanding that the importance of adherence to instructions is paramount to wound healing improvement or success. Please see discharge instructions.     Electronically signed by Maryse Strange DPM on 3/29/2023 at 11:21 AM    This note was generated with the assistance of a speech recognition program. While intending to generate a timely document that

## 2023-03-29 NOTE — TELEPHONE ENCOUNTER
Noted,this was faxed today to hemodialysis center  Future Appointments   Date Time Provider Angela Aissatou   3/29/2023 10:30 AM KADEEM Heaton   3/31/2023  4:00 PM Zelda Purcell MD [facility]   5/10/2023  2:00 PM Zelda Purcell MD [facility]   12/1/2023  3:30 PM Zelda Purcell MD [facility] Coco Hodges

## 2023-03-31 ENCOUNTER — TELEMEDICINE (OUTPATIENT)
Dept: FAMILY MEDICINE CLINIC | Age: 73
End: 2023-03-31
Payer: MEDICARE

## 2023-03-31 ENCOUNTER — TELEPHONE (OUTPATIENT)
Dept: FAMILY MEDICINE CLINIC | Age: 73
End: 2023-03-31

## 2023-03-31 DIAGNOSIS — I73.9 PVD (PERIPHERAL VASCULAR DISEASE) (HCC): ICD-10-CM

## 2023-03-31 DIAGNOSIS — Z99.2 STAGE 5 CHRONIC KIDNEY DISEASE ON CHRONIC DIALYSIS (HCC): ICD-10-CM

## 2023-03-31 DIAGNOSIS — J44.9 CHRONIC OBSTRUCTIVE PULMONARY DISEASE, UNSPECIFIED COPD TYPE (HCC): ICD-10-CM

## 2023-03-31 DIAGNOSIS — I50.42 CHRONIC COMBINED SYSTOLIC AND DIASTOLIC CHF (CONGESTIVE HEART FAILURE) (HCC): Primary | ICD-10-CM

## 2023-03-31 DIAGNOSIS — M81.8 OTHER OSTEOPOROSIS WITHOUT CURRENT PATHOLOGICAL FRACTURE: ICD-10-CM

## 2023-03-31 DIAGNOSIS — J96.11 CHRONIC RESPIRATORY FAILURE WITH HYPOXIA (HCC): ICD-10-CM

## 2023-03-31 DIAGNOSIS — N18.6 STAGE 5 CHRONIC KIDNEY DISEASE ON CHRONIC DIALYSIS (HCC): ICD-10-CM

## 2023-03-31 PROBLEM — M81.0 AGE-RELATED OSTEOPOROSIS WITHOUT CURRENT PATHOLOGICAL FRACTURE: Status: ACTIVE | Noted: 2023-03-31

## 2023-03-31 PROCEDURE — G8399 PT W/DXA RESULTS DOCUMENT: HCPCS | Performed by: FAMILY MEDICINE

## 2023-03-31 PROCEDURE — 1090F PRES/ABSN URINE INCON ASSESS: CPT | Performed by: FAMILY MEDICINE

## 2023-03-31 PROCEDURE — G8427 DOCREV CUR MEDS BY ELIG CLIN: HCPCS | Performed by: FAMILY MEDICINE

## 2023-03-31 PROCEDURE — 99214 OFFICE O/P EST MOD 30 MIN: CPT | Performed by: FAMILY MEDICINE

## 2023-03-31 PROCEDURE — 3017F COLORECTAL CA SCREEN DOC REV: CPT | Performed by: FAMILY MEDICINE

## 2023-03-31 PROCEDURE — 1124F ACP DISCUSS-NO DSCNMKR DOCD: CPT | Performed by: FAMILY MEDICINE

## 2023-03-31 ASSESSMENT — ENCOUNTER SYMPTOMS
CONSTIPATION: 0
NAUSEA: 0
VOMITING: 0
COUGH: 0
WHEEZING: 0
ABDOMINAL PAIN: 0
SHORTNESS OF BREATH: 1
DIARRHEA: 0
CHEST TIGHTNESS: 0
ABDOMINAL DISTENTION: 0

## 2023-03-31 NOTE — TELEPHONE ENCOUNTER
Form for oxygen evaluation and form from Alva Castleman at the , to be taken by MA when the patient comes for the nurse visit for oxygen evaluation

## 2023-04-24 ENCOUNTER — HOSPITAL ENCOUNTER (OUTPATIENT)
Dept: WOUND CARE | Age: 73
Discharge: HOME | End: 2023-04-24
Payer: MEDICARE

## 2023-04-24 VITALS
HEIGHT: 59 IN | BODY MASS INDEX: 25.2 KG/M2 | RESPIRATION RATE: 18 BRPM | HEART RATE: 100 BPM | DIASTOLIC BLOOD PRESSURE: 60 MMHG | TEMPERATURE: 97.8 F | WEIGHT: 125 LBS | SYSTOLIC BLOOD PRESSURE: 107 MMHG

## 2023-04-24 DIAGNOSIS — I87.2 VENOUS INSUFFICIENCY OF BOTH LOWER EXTREMITIES: Primary | ICD-10-CM

## 2023-04-24 PROCEDURE — 99213 OFFICE O/P EST LOW 20 MIN: CPT | Performed by: INTERNAL MEDICINE

## 2023-04-24 PROCEDURE — 99213 OFFICE O/P EST LOW 20 MIN: CPT

## 2023-04-24 RX ORDER — LIDOCAINE HYDROCHLORIDE 40 MG/ML
SOLUTION TOPICAL ONCE
OUTPATIENT
Start: 2023-04-24 | End: 2023-04-24

## 2023-04-24 RX ORDER — LIDOCAINE HYDROCHLORIDE 20 MG/ML
JELLY TOPICAL ONCE
OUTPATIENT
Start: 2023-04-24 | End: 2023-04-24

## 2023-04-24 RX ORDER — LIDOCAINE HYDROCHLORIDE 40 MG/ML
SOLUTION TOPICAL ONCE
Status: DISCONTINUED | OUTPATIENT
Start: 2023-04-24 | End: 2023-04-25 | Stop reason: HOSPADM

## 2023-04-24 ASSESSMENT — PAIN SCALES - GENERAL: PAINLEVEL_OUTOF10: 0

## 2023-04-24 NOTE — PROGRESS NOTES
[address]   Progress Note and Procedure Note      Josselin Ojeda  MEDICAL RECORD NUMBER:  [id number]  AGE: 67 y.o. GENDER: female  : 1950  EPISODE DATE:  2023    Subjective:     Chief Complaint   Patient presents with    Wound Check     Lower legs         HISTORY of PRESENT ILLNESS HPI     Johnny Jenkins is a 67 y.o. female who presents today for wound/ulcer evaluation. History of Wound Context: The patient is here for follow-up on bilateral lower extremity ulcers that was healed. The  She does have bilateral lower extremity swelling, mild redness that is improved.   The patient was recently treated with oral doxycycline that was completed 23   Ulcer Identification:  Ulcer Type: venous  Contributing Factors: edema, venous stasis, lymphedema and decreased mobility    PAST MEDICAL HISTORY        Diagnosis Date    Acute CVA (cerebrovascular accident) ([address] 2020    Acute kidney injury superimposed on CKD ([address] 2022    Acute on chronic combined systolic (congestive) and diastolic (congestive) heart failure ([address] 2022    Acute respiratory failure with hypoxia and hypercapnia (HCC)     JOY (acute kidney injury) ([address] 1/15/2022    Arthritis     Asthma     Bilateral lower extremity edema 2022    Bradycardia 2022    Chronic diastolic congestive heart failure ([address] 2020    Chronic ulcer of left leg with fat layer exposed ([address] 2022    CKD stage 4 secondary to hypertension ([address] 2020    Dependence on renal dialysis ([address]     ESRD (end stage renal disease) ([address]     Essential hypertension 2020    Gastrointestinal hemorrhage 2022    Hallucinations 2021    Hard of hearing     Head contusion 2020    Hyperkalemia 2022    Iron deficiency anemia, unspecified     Leg ulcer, left, with fat layer exposed ([address] 2022    Lymphedema 2022    Meningioma ([address] 2021    Meningioma, cerebral ([address] 2020

## 2023-04-28 NOTE — PROGRESS NOTES
Blood glucose 68 @1132 does have hx PUD/GI bleed in the past while on ASA in the past  - hemodynamics and H&H remain stable  - doubtful active bleed at this time as no BMs since admission  - GI consult appreciated, recommending conservative management at this time   - FOBT negative  - change pantoprazole 40mg IV to daily  - iron studies wnl  - b12/folate wnl  - eliquis resumed 4/26

## 2023-05-09 DIAGNOSIS — J30.89 ALLERGIC RHINITIS DUE TO OTHER ALLERGIC TRIGGER, UNSPECIFIED SEASONALITY: ICD-10-CM

## 2023-05-09 RX ORDER — DESLORATADINE 5 MG/1
TABLET ORAL
Qty: 90 TABLET | Refills: 3 | Status: SHIPPED | OUTPATIENT
Start: 2023-05-09

## 2023-05-09 NOTE — TELEPHONE ENCOUNTER
Please Approve or Refuse.   Send to Pharmacy per Pt's Request:      Next Visit Date:  5/10/2023   Last Visit Date: 3/31/2023    Hemoglobin A1C (%)   Date Value   11/11/2022 6.4   03/31/2022 4.6   11/24/2021 5.8             ( goal A1C is < 7)   BP Readings from Last 3 Encounters:   04/24/23 107/60   04/14/23 (!) 107/43   03/29/23 (!) 97/48          (goal 120/80)  BUN   Date Value Ref Range Status   10/18/2022 33 (H) 8 - 23 mg/dL Final     Creatinine   Date Value Ref Range Status   10/18/2022 3.51 (H) 0.50 - 0.90 mg/dL Final     Potassium   Date Value Ref Range Status   10/18/2022 5.6 (H) 3.7 - 5.3 mmol/L Final

## 2023-05-10 ENCOUNTER — OFFICE VISIT (OUTPATIENT)
Dept: FAMILY MEDICINE CLINIC | Age: 73
End: 2023-05-10
Payer: MEDICARE

## 2023-05-10 VITALS
BODY MASS INDEX: 24.6 KG/M2 | HEIGHT: 59 IN | DIASTOLIC BLOOD PRESSURE: 62 MMHG | TEMPERATURE: 97.9 F | WEIGHT: 122 LBS | SYSTOLIC BLOOD PRESSURE: 108 MMHG

## 2023-05-10 DIAGNOSIS — E11.42 TYPE 2 DIABETES MELLITUS WITH POLYNEUROPATHY (HCC): ICD-10-CM

## 2023-05-10 DIAGNOSIS — E11.22 TYPE 2 DIABETES MELLITUS WITH CHRONIC KIDNEY DISEASE ON CHRONIC DIALYSIS, WITHOUT LONG-TERM CURRENT USE OF INSULIN (HCC): Primary | ICD-10-CM

## 2023-05-10 DIAGNOSIS — I87.2 VENOUS INSUFFICIENCY OF BOTH LOWER EXTREMITIES: ICD-10-CM

## 2023-05-10 DIAGNOSIS — Z99.2 HYPERTENSIVE CKD, ESRD ON DIALYSIS (HCC): ICD-10-CM

## 2023-05-10 DIAGNOSIS — I48.0 PAROXYSMAL ATRIAL FIBRILLATION (HCC): ICD-10-CM

## 2023-05-10 DIAGNOSIS — I50.42 CHRONIC COMBINED SYSTOLIC AND DIASTOLIC CHF (CONGESTIVE HEART FAILURE) (HCC): ICD-10-CM

## 2023-05-10 DIAGNOSIS — I12.0 HYPERTENSIVE CKD, ESRD ON DIALYSIS (HCC): ICD-10-CM

## 2023-05-10 DIAGNOSIS — N18.6 HYPERTENSIVE CKD, ESRD ON DIALYSIS (HCC): ICD-10-CM

## 2023-05-10 DIAGNOSIS — M81.8 OTHER OSTEOPOROSIS WITHOUT CURRENT PATHOLOGICAL FRACTURE: ICD-10-CM

## 2023-05-10 DIAGNOSIS — L98.9 SKIN LESION OF FACE: ICD-10-CM

## 2023-05-10 DIAGNOSIS — J44.9 CHRONIC OBSTRUCTIVE PULMONARY DISEASE, UNSPECIFIED COPD TYPE (HCC): ICD-10-CM

## 2023-05-10 DIAGNOSIS — Z99.2 TYPE 2 DIABETES MELLITUS WITH CHRONIC KIDNEY DISEASE ON CHRONIC DIALYSIS, WITHOUT LONG-TERM CURRENT USE OF INSULIN (HCC): Primary | ICD-10-CM

## 2023-05-10 DIAGNOSIS — J96.11 CHRONIC RESPIRATORY FAILURE WITH HYPOXIA (HCC): ICD-10-CM

## 2023-05-10 DIAGNOSIS — Z23 ENCOUNTER FOR IMMUNIZATION: ICD-10-CM

## 2023-05-10 DIAGNOSIS — I73.9 PVD (PERIPHERAL VASCULAR DISEASE) (HCC): ICD-10-CM

## 2023-05-10 DIAGNOSIS — N18.6 TYPE 2 DIABETES MELLITUS WITH CHRONIC KIDNEY DISEASE ON CHRONIC DIALYSIS, WITHOUT LONG-TERM CURRENT USE OF INSULIN (HCC): Primary | ICD-10-CM

## 2023-05-10 LAB — HBA1C MFR BLD: 5.7 %

## 2023-05-10 PROCEDURE — 99214 OFFICE O/P EST MOD 30 MIN: CPT | Performed by: FAMILY MEDICINE

## 2023-05-10 PROCEDURE — G8427 DOCREV CUR MEDS BY ELIG CLIN: HCPCS | Performed by: FAMILY MEDICINE

## 2023-05-10 PROCEDURE — 2022F DILAT RTA XM EVC RTNOPTHY: CPT | Performed by: FAMILY MEDICINE

## 2023-05-10 PROCEDURE — 1124F ACP DISCUSS-NO DSCNMKR DOCD: CPT | Performed by: FAMILY MEDICINE

## 2023-05-10 PROCEDURE — 3044F HG A1C LEVEL LT 7.0%: CPT | Performed by: FAMILY MEDICINE

## 2023-05-10 PROCEDURE — 90746 HEPB VACCINE 3 DOSE ADULT IM: CPT | Performed by: FAMILY MEDICINE

## 2023-05-10 PROCEDURE — 83036 HEMOGLOBIN GLYCOSYLATED A1C: CPT | Performed by: FAMILY MEDICINE

## 2023-05-10 PROCEDURE — 3017F COLORECTAL CA SCREEN DOC REV: CPT | Performed by: FAMILY MEDICINE

## 2023-05-10 PROCEDURE — G8420 CALC BMI NORM PARAMETERS: HCPCS | Performed by: FAMILY MEDICINE

## 2023-05-10 PROCEDURE — 1036F TOBACCO NON-USER: CPT | Performed by: FAMILY MEDICINE

## 2023-05-10 PROCEDURE — 1090F PRES/ABSN URINE INCON ASSESS: CPT | Performed by: FAMILY MEDICINE

## 2023-05-10 PROCEDURE — 3023F SPIROM DOC REV: CPT | Performed by: FAMILY MEDICINE

## 2023-05-10 PROCEDURE — G0010 ADMIN HEPATITIS B VACCINE: HCPCS | Performed by: FAMILY MEDICINE

## 2023-05-10 PROCEDURE — G8399 PT W/DXA RESULTS DOCUMENT: HCPCS | Performed by: FAMILY MEDICINE

## 2023-05-10 RX ORDER — TETANUS AND DIPHTHERIA TOXOIDS ADSORBED 2; 2 [LF]/.5ML; [LF]/.5ML
0.5 INJECTION INTRAMUSCULAR ONCE
Qty: 0.5 ML | Refills: 0 | Status: CANCELLED | OUTPATIENT
Start: 2023-05-10 | End: 2023-05-10

## 2023-05-10 RX ORDER — LIDOCAINE 50 MG/G
1 PATCH TOPICAL DAILY
Qty: 30 PATCH | Refills: 3 | Status: SHIPPED | OUTPATIENT
Start: 2023-05-10

## 2023-05-10 RX ORDER — BNT162B2 0.23 MG/2.25ML
0.3 INJECTION, SUSPENSION INTRAMUSCULAR ONCE
Qty: 0.3 ML | Refills: 0 | Status: SHIPPED | OUTPATIENT
Start: 2023-05-10 | End: 2023-05-10

## 2023-05-10 SDOH — ECONOMIC STABILITY: INCOME INSECURITY: HOW HARD IS IT FOR YOU TO PAY FOR THE VERY BASICS LIKE FOOD, HOUSING, MEDICAL CARE, AND HEATING?: NOT HARD AT ALL

## 2023-05-10 SDOH — ECONOMIC STABILITY: HOUSING INSECURITY
IN THE LAST 12 MONTHS, WAS THERE A TIME WHEN YOU DID NOT HAVE A STEADY PLACE TO SLEEP OR SLEPT IN A SHELTER (INCLUDING NOW)?: NO

## 2023-05-10 SDOH — ECONOMIC STABILITY: FOOD INSECURITY: WITHIN THE PAST 12 MONTHS, YOU WORRIED THAT YOUR FOOD WOULD RUN OUT BEFORE YOU GOT MONEY TO BUY MORE.: NEVER TRUE

## 2023-05-10 SDOH — ECONOMIC STABILITY: FOOD INSECURITY: WITHIN THE PAST 12 MONTHS, THE FOOD YOU BOUGHT JUST DIDN'T LAST AND YOU DIDN'T HAVE MONEY TO GET MORE.: NEVER TRUE

## 2023-05-10 ASSESSMENT — ENCOUNTER SYMPTOMS
DIARRHEA: 0
VOMITING: 0
WHEEZING: 0
CHEST TIGHTNESS: 0
ABDOMINAL PAIN: 0
CONSTIPATION: 0
ABDOMINAL DISTENTION: 0
COUGH: 0
NAUSEA: 0

## 2023-05-10 ASSESSMENT — PATIENT HEALTH QUESTIONNAIRE - PHQ9
SUM OF ALL RESPONSES TO PHQ QUESTIONS 1-9: 0
1. LITTLE INTEREST OR PLEASURE IN DOING THINGS: 0
SUM OF ALL RESPONSES TO PHQ QUESTIONS 1-9: 0
SUM OF ALL RESPONSES TO PHQ QUESTIONS 1-9: 0
2. FEELING DOWN, DEPRESSED OR HOPELESS: 0
SUM OF ALL RESPONSES TO PHQ9 QUESTIONS 1 & 2: 0
SUM OF ALL RESPONSES TO PHQ QUESTIONS 1-9: 0

## 2023-05-19 DIAGNOSIS — J30.89 ALLERGIC RHINITIS DUE TO OTHER ALLERGIC TRIGGER, UNSPECIFIED SEASONALITY: ICD-10-CM

## 2023-05-19 RX ORDER — DESLORATADINE 5 MG/1
TABLET ORAL
Qty: 30 TABLET | Refills: 0 | Status: SHIPPED | OUTPATIENT
Start: 2023-05-19

## 2023-05-19 NOTE — TELEPHONE ENCOUNTER
Please Approve or Refuse. Send to Pharmacy per Pt's Request: PT ASKING FOR 1 FILL OF A 30 DAY SUPPLY DUE TO MAIL ORDER STILL HAS NOT ARRIVED PLEASE ADVISE?  I DID SEE MEDICATION WAS SENT ON 05/09/2023      FYI: PATIENT DAUGHTER STATED CARDIOLOGY PROVIDER DID STATE FISSULA IS MEDICALLY NECESSITY        Next Visit Date:  Visit date not found   Last Visit Date: 5/10/2023    Hemoglobin A1C (%)   Date Value   05/10/2023 5.7   11/11/2022 6.4   03/31/2022 4.6             ( goal A1C is < 7)   BP Readings from Last 3 Encounters:   05/10/23 108/62   04/24/23 107/60   04/14/23 (!) 107/43          (goal 120/80)  BUN   Date Value Ref Range Status   10/18/2022 33 (H) 8 - 23 mg/dL Final     Creatinine   Date Value Ref Range Status   10/18/2022 3.51 (H) 0.50 - 0.90 mg/dL Final     Potassium   Date Value Ref Range Status   10/18/2022 5.6 (H) 3.7 - 5.3 mmol/L Final

## 2023-05-21 ASSESSMENT — ENCOUNTER SYMPTOMS: SHORTNESS OF BREATH: 1

## 2023-05-22 ENCOUNTER — TELEPHONE (OUTPATIENT)
Dept: FAMILY MEDICINE CLINIC | Age: 73
End: 2023-05-22

## 2023-05-22 NOTE — TELEPHONE ENCOUNTER
PATIENT HAD PROCEDURE TODAY. INSTRUCTED BY SURGEON IF SHE HAS ANY PAIN/DISCOMFORT SHE CAN TAKE TYLENOL.  PATIENT IS ASKING IF THAT IS OK WITH YOU DUE TO SHE THOUGHT  WAS INSTRUCTED TO TAKE TYLENOL A SPECIFIC WAY DUE TO HER HEART CONDITIONS PLEASE ADVISE

## 2023-05-22 NOTE — TELEPHONE ENCOUNTER
Tylenol 500 mg every 6 hours as needed for pain okay to take-  Future Appointments   Date Time Provider Angela Reyez   7/3/2023  1:45 PM Michael Russell MD [facility]   12/1/2023  3:30 PM Kiarra Kerns MD  [facility]

## 2023-06-20 DIAGNOSIS — J30.89 ALLERGIC RHINITIS DUE TO OTHER ALLERGIC TRIGGER, UNSPECIFIED SEASONALITY: ICD-10-CM

## 2023-06-20 RX ORDER — DESLORATADINE 5 MG/1
TABLET ORAL
Qty: 30 TABLET | Refills: 0 | Status: SHIPPED | OUTPATIENT
Start: 2023-06-20

## 2023-06-20 NOTE — TELEPHONE ENCOUNTER
Please Approve or Refuse.   Send to Pharmacy per Pt's Request: Ni Lisa      Next Visit Date:  12/1/2023   Last Visit Date: 5/10/2023    Hemoglobin A1C (%)   Date Value   05/10/2023 5.7   11/11/2022 6.4   03/31/2022 4.6             ( goal A1C is < 7)   BP Readings from Last 3 Encounters:   05/10/23 108/62   04/24/23 107/60   04/14/23 (!) 107/43          (goal 120/80)  BUN   Date Value Ref Range Status   10/18/2022 33 (H) 8 - 23 mg/dL Final     Creatinine   Date Value Ref Range Status   10/18/2022 3.51 (H) 0.50 - 0.90 mg/dL Final     Potassium   Date Value Ref Range Status   10/18/2022 5.6 (H) 3.7 - 5.3 mmol/L Final

## 2023-06-29 DIAGNOSIS — E78.5 HYPERLIPIDEMIA WITH TARGET LDL LESS THAN 70: ICD-10-CM

## 2023-06-29 DIAGNOSIS — I48.0 PAROXYSMAL ATRIAL FIBRILLATION (HCC): ICD-10-CM

## 2023-06-29 DIAGNOSIS — K21.9 GASTROESOPHAGEAL REFLUX DISEASE, UNSPECIFIED WHETHER ESOPHAGITIS PRESENT: ICD-10-CM

## 2023-06-29 RX ORDER — OMEPRAZOLE 20 MG/1
CAPSULE, DELAYED RELEASE ORAL
Qty: 90 CAPSULE | Refills: 3 | Status: SHIPPED | OUTPATIENT
Start: 2023-06-29

## 2023-06-29 RX ORDER — ATORVASTATIN CALCIUM 40 MG/1
40 TABLET, FILM COATED ORAL EVERY EVENING
Qty: 90 TABLET | Refills: 3 | Status: SHIPPED | OUTPATIENT
Start: 2023-06-29

## 2023-06-29 RX ORDER — AMIODARONE HYDROCHLORIDE 100 MG/1
100 TABLET ORAL EVERY MORNING
Qty: 90 TABLET | Refills: 3 | Status: SHIPPED | OUTPATIENT
Start: 2023-06-29

## 2023-06-30 ENCOUNTER — TELEPHONE (OUTPATIENT)
Dept: FAMILY MEDICINE CLINIC | Age: 73
End: 2023-06-30

## 2023-07-05 DIAGNOSIS — N18.6 HYPERTENSIVE CKD, ESRD ON DIALYSIS (HCC): ICD-10-CM

## 2023-07-05 DIAGNOSIS — E03.9 ACQUIRED HYPOTHYROIDISM: ICD-10-CM

## 2023-07-05 DIAGNOSIS — I12.0 HYPERTENSIVE CKD, ESRD ON DIALYSIS (HCC): ICD-10-CM

## 2023-07-05 DIAGNOSIS — Z99.2 HYPERTENSIVE CKD, ESRD ON DIALYSIS (HCC): ICD-10-CM

## 2023-07-05 RX ORDER — LEVOTHYROXINE SODIUM 0.03 MG/1
25 TABLET ORAL
Qty: 90 TABLET | Refills: 3 | Status: SHIPPED | OUTPATIENT
Start: 2023-07-05

## 2023-07-05 RX ORDER — METOPROLOL SUCCINATE 25 MG/1
25 TABLET, EXTENDED RELEASE ORAL EVERY EVENING
Qty: 90 TABLET | Refills: 3 | Status: SHIPPED | OUTPATIENT
Start: 2023-07-05

## 2023-07-05 NOTE — TELEPHONE ENCOUNTER
Please Approve or Refuse.   Send to Pharmacy per Pt's Request: she would also like a 30 day supply sent to [facility] on [address]      Next Visit Date:  12/1/2023   Last Visit Date: 5/10/2023    Hemoglobin A1C (%)   Date Value   05/10/2023 5.7   11/11/2022 6.4   03/31/2022 4.6             ( goal A1C is < 7)   BP Readings from Last 3 Encounters:   05/10/23 108/62   04/24/23 107/60   04/14/23 (!) 107/43          (goal 120/80)  BUN   Date Value Ref Range Status   10/18/2022 33 (H) 8 - 23 mg/dL Final     Creatinine   Date Value Ref Range Status   10/18/2022 3.51 (H) 0.50 - 0.90 mg/dL Final     Potassium   Date Value Ref Range Status   10/18/2022 5.6 (H) 3.7 - 5.3 mmol/L Final

## 2023-07-11 DIAGNOSIS — I12.0 HYPERTENSIVE CKD, ESRD ON DIALYSIS (HCC): ICD-10-CM

## 2023-07-11 DIAGNOSIS — N18.6 HYPERTENSIVE CKD, ESRD ON DIALYSIS (HCC): ICD-10-CM

## 2023-07-11 DIAGNOSIS — I48.0 PAROXYSMAL ATRIAL FIBRILLATION (HCC): ICD-10-CM

## 2023-07-11 DIAGNOSIS — F41.9 MODERATE ANXIETY: ICD-10-CM

## 2023-07-11 DIAGNOSIS — I95.3 HEMODIALYSIS-ASSOCIATED HYPOTENSION: ICD-10-CM

## 2023-07-11 DIAGNOSIS — E78.5 HYPERLIPIDEMIA WITH TARGET LDL LESS THAN 70: ICD-10-CM

## 2023-07-11 DIAGNOSIS — K21.9 GASTROESOPHAGEAL REFLUX DISEASE, UNSPECIFIED WHETHER ESOPHAGITIS PRESENT: ICD-10-CM

## 2023-07-11 DIAGNOSIS — Z99.2 HYPERTENSIVE CKD, ESRD ON DIALYSIS (HCC): ICD-10-CM

## 2023-07-11 DIAGNOSIS — J44.9 CHRONIC OBSTRUCTIVE PULMONARY DISEASE, UNSPECIFIED COPD TYPE (HCC): ICD-10-CM

## 2023-07-11 RX ORDER — ALBUTEROL SULFATE 90 UG/1
2 AEROSOL, METERED RESPIRATORY (INHALATION) EVERY 6 HOURS PRN
Qty: 36 G | Refills: 3 | Status: SHIPPED | OUTPATIENT
Start: 2023-07-11

## 2023-07-11 RX ORDER — ATORVASTATIN CALCIUM 40 MG/1
40 TABLET, FILM COATED ORAL EVERY EVENING
Qty: 90 TABLET | Refills: 3 | Status: SHIPPED | OUTPATIENT
Start: 2023-07-11

## 2023-07-11 RX ORDER — FLUTICASONE PROPIONATE AND SALMETEROL XINAFOATE 115; 21 UG/1; UG/1
2 AEROSOL, METERED RESPIRATORY (INHALATION) 2 TIMES DAILY
Qty: 36 G | Refills: 3 | Status: SHIPPED | OUTPATIENT
Start: 2023-07-11

## 2023-07-11 RX ORDER — FUROSEMIDE 80 MG
80 TABLET ORAL EVERY OTHER DAY
Qty: 90 TABLET | Refills: 3 | Status: SHIPPED | OUTPATIENT
Start: 2023-07-11

## 2023-07-11 RX ORDER — MIDODRINE HYDROCHLORIDE 5 MG/1
5 TABLET ORAL 2 TIMES DAILY PRN
Qty: 90 TABLET | Refills: 3 | Status: SHIPPED | OUTPATIENT
Start: 2023-07-11

## 2023-07-11 RX ORDER — OMEPRAZOLE 20 MG/1
CAPSULE, DELAYED RELEASE ORAL
Qty: 90 CAPSULE | Refills: 3 | Status: SHIPPED | OUTPATIENT
Start: 2023-07-11

## 2023-07-11 RX ORDER — AMIODARONE HYDROCHLORIDE 100 MG/1
100 TABLET ORAL EVERY MORNING
Qty: 90 TABLET | Refills: 3 | Status: SHIPPED | OUTPATIENT
Start: 2023-07-11

## 2023-07-11 RX ORDER — HYDROXYZINE 50 MG/1
50 TABLET, FILM COATED ORAL PRN
Qty: 90 TABLET | Refills: 3 | Status: SHIPPED | OUTPATIENT
Start: 2023-07-11

## 2023-07-11 NOTE — TELEPHONE ENCOUNTER
Please Approve or Refuse.   Send to Pharmacy per Pt's Request: Viky Martinez      Next Visit Date:  12/1/2023   Last Visit Date: 5/10/2023    Hemoglobin A1C (%)   Date Value   05/10/2023 5.7   11/11/2022 6.4   03/31/2022 4.6             ( goal A1C is < 7)   BP Readings from Last 3 Encounters:   05/10/23 108/62   04/24/23 107/60   04/14/23 (!) 107/43          (goal 120/80)  BUN   Date Value Ref Range Status   10/18/2022 33 (H) 8 - 23 mg/dL Final     Creatinine   Date Value Ref Range Status   10/18/2022 3.51 (H) 0.50 - 0.90 mg/dL Final     Potassium   Date Value Ref Range Status   10/18/2022 5.6 (H) 3.7 - 5.3 mmol/L Final

## 2023-07-13 DIAGNOSIS — E03.9 ACQUIRED HYPOTHYROIDISM: ICD-10-CM

## 2023-07-13 RX ORDER — LEVOTHYROXINE SODIUM 0.03 MG/1
25 TABLET ORAL
Qty: 90 TABLET | Refills: 3 | Status: SHIPPED | OUTPATIENT
Start: 2023-07-13

## 2023-07-13 NOTE — TELEPHONE ENCOUNTER
Please Approve or Refuse.   Send to Pharmacy per Pt's Request: [facility]        amiodarone (PACERONE) 100 MG tablet [[id number]]  DISCONTINUED    Order Details  Dose: 100 mg Route: Oral Frequency: EVERY MORNING   Dispense Quantity: 90 tablet Refills: 3          Sig: Take 1 tablet by mouth every morning         Start Date: 06/29/23 End Date: 07/11/23   Discontinued by: Emelyn Frias MD on 7/11/2023 13:57   Reason: Darby Paulson         Written Date: 06/29/23 Expiration Date: 06/28/24       Diagnosis Association: Paroxysmal atrial fibrillation (HCC) (I48.0)   Original Order:  amiodarone (PACERONE) 100 MG tablet [[id number]]   Providers        Next Visit Date:  12/1/2023   Last Visit Date: 5/10/2023    Hemoglobin A1C (%)   Date Value   05/10/2023 5.7   11/11/2022 6.4   03/31/2022 4.6             ( goal A1C is < 7)   BP Readings from Last 3 Encounters:   05/10/23 108/62   04/24/23 107/60   04/14/23 (!) 107/43          (goal 120/80)  BUN   Date Value Ref Range Status   10/18/2022 33 (H) 8 - 23 mg/dL Final     Creatinine   Date Value Ref Range Status   10/18/2022 3.51 (H) 0.50 - 0.90 mg/dL Final     Potassium   Date Value Ref Range Status   10/18/2022 5.6 (H) 3.7 - 5.3 mmol/L Final

## 2023-07-19 DIAGNOSIS — J44.9 CHRONIC OBSTRUCTIVE PULMONARY DISEASE, UNSPECIFIED COPD TYPE (HCC): ICD-10-CM

## 2023-07-19 RX ORDER — ALBUTEROL SULFATE 90 UG/1
2 AEROSOL, METERED RESPIRATORY (INHALATION) EVERY 6 HOURS PRN
Qty: 36 G | Refills: 3 | Status: ON HOLD | OUTPATIENT
Start: 2023-07-19

## 2023-07-19 RX ORDER — FLUTICASONE PROPIONATE AND SALMETEROL XINAFOATE 115; 21 UG/1; UG/1
2 AEROSOL, METERED RESPIRATORY (INHALATION) 2 TIMES DAILY
Qty: 36 G | Refills: 3 | Status: ON HOLD | OUTPATIENT
Start: 2023-07-19

## 2023-07-19 NOTE — TELEPHONE ENCOUNTER
Please Approve or Refuse.   Send to Pharmacy per Pt's Request: meds by mail     Next Visit Date:  12/1/2023   Last Visit Date: 5/10/2023    Hemoglobin A1C (%)   Date Value   05/10/2023 5.7   11/11/2022 6.4   03/31/2022 4.6             ( goal A1C is < 7)   BP Readings from Last 3 Encounters:   05/10/23 108/62   04/24/23 107/60   04/14/23 (!) 107/43          (goal 120/80)  BUN   Date Value Ref Range Status   10/18/2022 33 (H) 8 - 23 mg/dL Final     Creatinine   Date Value Ref Range Status   10/18/2022 3.51 (H) 0.50 - 0.90 mg/dL Final     Potassium   Date Value Ref Range Status   10/18/2022 5.6 (H) 3.7 - 5.3 mmol/L Final 36.7

## 2023-07-22 ENCOUNTER — APPOINTMENT (OUTPATIENT)
Dept: GENERAL RADIOLOGY | Age: 73
DRG: 699 | End: 2023-07-22
Payer: MEDICARE

## 2023-07-22 ENCOUNTER — HOSPITAL ENCOUNTER (INPATIENT)
Age: 73
LOS: 4 days | Discharge: HOME | DRG: 699 | End: 2023-07-26
Attending: EMERGENCY MEDICINE | Admitting: INTERNAL MEDICINE
Payer: MEDICARE

## 2023-07-22 DIAGNOSIS — I21.4 NSTEMI (NON-ST ELEVATED MYOCARDIAL INFARCTION) (HCC): Primary | ICD-10-CM

## 2023-07-22 DIAGNOSIS — L03.119 CELLULITIS OF LOWER EXTREMITY, UNSPECIFIED LATERALITY: ICD-10-CM

## 2023-07-22 DIAGNOSIS — I25.10 CORONARY ARTERY DISEASE INVOLVING NATIVE CORONARY ARTERY OF NATIVE HEART WITHOUT ANGINA PECTORIS: ICD-10-CM

## 2023-07-22 LAB
ALBUMIN SERPL-MCNC: 3.7 G/DL (ref 3.5–5.2)
ALP SERPL-CCNC: 91 U/L (ref 35–104)
ALT SERPL-CCNC: 10 U/L (ref 5–33)
ANION GAP SERPL CALCULATED.3IONS-SCNC: 14 MMOL/L (ref 9–17)
AST SERPL-CCNC: 19 U/L
BASOPHILS # BLD: 0 K/UL (ref 0–0.2)
BASOPHILS NFR BLD: 0 % (ref 0–2)
BILIRUB SERPL-MCNC: 0.3 MG/DL (ref 0.3–1.2)
BUN SERPL-MCNC: 51 MG/DL (ref 8–23)
CALCIUM SERPL-MCNC: 11.9 MG/DL (ref 8.6–10.4)
CHLORIDE SERPL-SCNC: 94 MMOL/L (ref 98–107)
CO2 SERPL-SCNC: 28 MMOL/L (ref 20–31)
CREAT SERPL-MCNC: 5.2 MG/DL (ref 0.5–0.9)
EOSINOPHIL # BLD: 0.1 K/UL (ref 0–0.4)
EOSINOPHILS RELATIVE PERCENT: 1 % (ref 0–4)
ERYTHROCYTE [DISTWIDTH] IN BLOOD BY AUTOMATED COUNT: 14.6 % (ref 11.5–14.9)
FLUAV RNA RESP QL NAA+PROBE: NOT DETECTED
FLUBV RNA RESP QL NAA+PROBE: NOT DETECTED
GFR SERPL CREATININE-BSD FRML MDRD: 8 ML/MIN/1.73M2
GLUCOSE SERPL-MCNC: 157 MG/DL (ref 70–99)
HCT VFR BLD AUTO: 36.9 % (ref 36–46)
HGB BLD-MCNC: 12.3 G/DL (ref 12–16)
INR PPP: 0.9
LIPASE SERPL-CCNC: 13 U/L (ref 13–60)
LYMPHOCYTES NFR BLD: 0.9 K/UL (ref 1–4.8)
LYMPHOCYTES RELATIVE PERCENT: 13 % (ref 24–44)
MAGNESIUM SERPL-MCNC: 2.2 MG/DL (ref 1.6–2.6)
MCH RBC QN AUTO: 31.2 PG (ref 26–34)
MCHC RBC AUTO-ENTMCNC: 33.2 G/DL (ref 31–37)
MCV RBC AUTO: 94.1 FL (ref 80–100)
MONOCYTES NFR BLD: 0.6 K/UL (ref 0.1–1.3)
MONOCYTES NFR BLD: 8 % (ref 1–7)
NEUTROPHILS NFR BLD: 78 % (ref 36–66)
NEUTS SEG NFR BLD: 5.7 K/UL (ref 1.3–9.1)
PLATELET # BLD AUTO: 180 K/UL (ref 150–450)
PMV BLD AUTO: 8.1 FL (ref 6–12)
POTASSIUM SERPL-SCNC: 4.1 MMOL/L (ref 3.7–5.3)
PROT SERPL-MCNC: 6.8 G/DL (ref 6.4–8.3)
PROTHROMBIN TIME: 12.9 SEC (ref 11.8–14.6)
RBC # BLD AUTO: 3.93 M/UL (ref 4–5.2)
SARS-COV-2 RNA RESP QL NAA+PROBE: NOT DETECTED
SODIUM SERPL-SCNC: 136 MMOL/L (ref 135–144)
SOURCE: NORMAL
SPECIMEN DESCRIPTION: NORMAL
T4 FREE SERPL-MCNC: 1.5 NG/DL (ref 0.9–1.7)
TROPONIN I SERPL HS-MCNC: 127 NG/L (ref 0–14)
TROPONIN I SERPL HS-MCNC: 129 NG/L (ref 0–14)
TROPONIN I SERPL HS-MCNC: 150 NG/L (ref 0–14)
TSH SERPL DL<=0.05 MIU/L-ACNC: 1.27 UIU/ML (ref 0.3–5)
WBC OTHER # BLD: 7.3 K/UL (ref 3.5–11)

## 2023-07-22 PROCEDURE — 84484 ASSAY OF TROPONIN QUANT: CPT

## 2023-07-22 PROCEDURE — 87636 SARSCOV2 & INF A&B AMP PRB: CPT

## 2023-07-22 PROCEDURE — 6360000002 HC RX W HCPCS: Performed by: INTERNAL MEDICINE

## 2023-07-22 PROCEDURE — 93005 ELECTROCARDIOGRAM TRACING: CPT

## 2023-07-22 PROCEDURE — 2060000000 HC ICU INTERMEDIATE R&B

## 2023-07-22 PROCEDURE — 2580000003 HC RX 258: Performed by: INTERNAL MEDICINE

## 2023-07-22 PROCEDURE — 99285 EMERGENCY DEPT VISIT HI MDM: CPT

## 2023-07-22 PROCEDURE — 99223 1ST HOSP IP/OBS HIGH 75: CPT | Performed by: INTERNAL MEDICINE

## 2023-07-22 PROCEDURE — 6370000000 HC RX 637 (ALT 250 FOR IP): Performed by: INTERNAL MEDICINE

## 2023-07-22 PROCEDURE — 84443 ASSAY THYROID STIM HORMONE: CPT

## 2023-07-22 PROCEDURE — 83735 ASSAY OF MAGNESIUM: CPT

## 2023-07-22 PROCEDURE — 83690 ASSAY OF LIPASE: CPT

## 2023-07-22 PROCEDURE — 84439 ASSAY OF FREE THYROXINE: CPT

## 2023-07-22 PROCEDURE — 36415 COLL VENOUS BLD VENIPUNCTURE: CPT

## 2023-07-22 PROCEDURE — 85027 COMPLETE CBC AUTOMATED: CPT

## 2023-07-22 PROCEDURE — 87641 MR-STAPH DNA AMP PROBE: CPT

## 2023-07-22 PROCEDURE — 80053 COMPREHEN METABOLIC PANEL: CPT

## 2023-07-22 PROCEDURE — 85610 PROTHROMBIN TIME: CPT

## 2023-07-22 PROCEDURE — 71045 X-RAY EXAM CHEST 1 VIEW: CPT

## 2023-07-22 RX ORDER — ACETAMINOPHEN 325 MG/1
650 TABLET ORAL EVERY 6 HOURS PRN
Status: DISCONTINUED | OUTPATIENT
Start: 2023-07-22 | End: 2023-07-26 | Stop reason: HOSPADM

## 2023-07-22 RX ORDER — MIDODRINE HYDROCHLORIDE 5 MG/1
5 TABLET ORAL 2 TIMES DAILY PRN
Status: DISCONTINUED | OUTPATIENT
Start: 2023-07-22 | End: 2023-07-26 | Stop reason: HOSPADM

## 2023-07-22 RX ORDER — ATORVASTATIN CALCIUM 40 MG/1
40 TABLET, FILM COATED ORAL EVERY EVENING
Status: DISCONTINUED | OUTPATIENT
Start: 2023-07-22 | End: 2023-07-26 | Stop reason: HOSPADM

## 2023-07-22 RX ORDER — AMIODARONE HYDROCHLORIDE 200 MG/1
100 TABLET ORAL EVERY MORNING
Status: DISCONTINUED | OUTPATIENT
Start: 2023-07-23 | End: 2023-07-26 | Stop reason: HOSPADM

## 2023-07-22 RX ORDER — HEPARIN SODIUM 10000 [USP'U]/100ML
5-30 INJECTION, SOLUTION INTRAVENOUS CONTINUOUS
Status: DISCONTINUED | OUTPATIENT
Start: 2023-07-22 | End: 2023-07-22

## 2023-07-22 RX ORDER — PANTOPRAZOLE SODIUM 40 MG/1
40 TABLET, DELAYED RELEASE ORAL
Status: DISCONTINUED | OUTPATIENT
Start: 2023-07-23 | End: 2023-07-26 | Stop reason: HOSPADM

## 2023-07-22 RX ORDER — SODIUM CHLORIDE 9 MG/ML
INJECTION, SOLUTION INTRAVENOUS PRN
Status: DISCONTINUED | OUTPATIENT
Start: 2023-07-22 | End: 2023-07-26 | Stop reason: HOSPADM

## 2023-07-22 RX ORDER — ONDANSETRON 2 MG/ML
4 INJECTION INTRAMUSCULAR; INTRAVENOUS EVERY 6 HOURS PRN
Status: DISCONTINUED | OUTPATIENT
Start: 2023-07-22 | End: 2023-07-26 | Stop reason: HOSPADM

## 2023-07-22 RX ORDER — SODIUM CHLORIDE 0.9 % (FLUSH) 0.9 %
5-40 SYRINGE (ML) INJECTION EVERY 12 HOURS SCHEDULED
Status: DISCONTINUED | OUTPATIENT
Start: 2023-07-22 | End: 2023-07-26 | Stop reason: HOSPADM

## 2023-07-22 RX ORDER — ONDANSETRON 4 MG/1
4 TABLET, ORALLY DISINTEGRATING ORAL EVERY 8 HOURS PRN
Status: DISCONTINUED | OUTPATIENT
Start: 2023-07-22 | End: 2023-07-26 | Stop reason: HOSPADM

## 2023-07-22 RX ORDER — HEPARIN SODIUM 1000 [USP'U]/ML
60 INJECTION, SOLUTION INTRAVENOUS; SUBCUTANEOUS ONCE
Status: DISCONTINUED | OUTPATIENT
Start: 2023-07-22 | End: 2023-07-22

## 2023-07-22 RX ORDER — ALBUTEROL SULFATE 90 UG/1
2 AEROSOL, METERED RESPIRATORY (INHALATION) EVERY 6 HOURS PRN
Status: DISCONTINUED | OUTPATIENT
Start: 2023-07-22 | End: 2023-07-26 | Stop reason: HOSPADM

## 2023-07-22 RX ORDER — HEPARIN SODIUM 1000 [USP'U]/ML
60 INJECTION, SOLUTION INTRAVENOUS; SUBCUTANEOUS PRN
Status: DISCONTINUED | OUTPATIENT
Start: 2023-07-22 | End: 2023-07-22

## 2023-07-22 RX ORDER — HEPARIN SODIUM 5000 [USP'U]/ML
5000 INJECTION, SOLUTION INTRAVENOUS; SUBCUTANEOUS EVERY 8 HOURS SCHEDULED
Status: DISCONTINUED | OUTPATIENT
Start: 2023-07-22 | End: 2023-07-26 | Stop reason: HOSPADM

## 2023-07-22 RX ORDER — POLYETHYLENE GLYCOL 3350 17 G/17G
17 POWDER, FOR SOLUTION ORAL DAILY PRN
Status: DISCONTINUED | OUTPATIENT
Start: 2023-07-22 | End: 2023-07-26 | Stop reason: HOSPADM

## 2023-07-22 RX ORDER — HEPARIN SODIUM 1000 [USP'U]/ML
30 INJECTION, SOLUTION INTRAVENOUS; SUBCUTANEOUS PRN
Status: DISCONTINUED | OUTPATIENT
Start: 2023-07-22 | End: 2023-07-22

## 2023-07-22 RX ORDER — SODIUM CHLORIDE 0.9 % (FLUSH) 0.9 %
5-40 SYRINGE (ML) INJECTION PRN
Status: DISCONTINUED | OUTPATIENT
Start: 2023-07-22 | End: 2023-07-26 | Stop reason: HOSPADM

## 2023-07-22 RX ORDER — LEVOTHYROXINE SODIUM 0.03 MG/1
25 TABLET ORAL
Status: DISCONTINUED | OUTPATIENT
Start: 2023-07-23 | End: 2023-07-26 | Stop reason: HOSPADM

## 2023-07-22 RX ORDER — BUDESONIDE AND FORMOTEROL FUMARATE DIHYDRATE 80; 4.5 UG/1; UG/1
2 AEROSOL RESPIRATORY (INHALATION)
Status: DISCONTINUED | OUTPATIENT
Start: 2023-07-22 | End: 2023-07-26 | Stop reason: HOSPADM

## 2023-07-22 RX ORDER — ACETAMINOPHEN 325 MG/1
650 TABLET ORAL EVERY 6 HOURS PRN
Status: DISCONTINUED | OUTPATIENT
Start: 2023-07-22 | End: 2023-07-22 | Stop reason: SDUPTHER

## 2023-07-22 RX ORDER — ACETAMINOPHEN 650 MG/1
650 SUPPOSITORY RECTAL EVERY 6 HOURS PRN
Status: DISCONTINUED | OUTPATIENT
Start: 2023-07-22 | End: 2023-07-26 | Stop reason: HOSPADM

## 2023-07-22 RX ADMIN — HEPARIN SODIUM 5000 UNITS: 5000 INJECTION INTRAVENOUS; SUBCUTANEOUS at 22:26

## 2023-07-22 RX ADMIN — VANCOMYCIN HYDROCHLORIDE 1500 MG: 1.5 INJECTION, POWDER, LYOPHILIZED, FOR SOLUTION INTRAVENOUS at 21:00

## 2023-07-22 RX ADMIN — ATORVASTATIN CALCIUM 40 MG: 40 TABLET, FILM COATED ORAL at 22:29

## 2023-07-22 ASSESSMENT — ENCOUNTER SYMPTOMS
SHORTNESS OF BREATH: 0
ABDOMINAL PAIN: 0
COLOR CHANGE: 0
BACK PAIN: 0
EYE PAIN: 0

## 2023-07-22 NOTE — H&P
Collection Time: 07/22/23 12:40 PM   Result Value Ref Range    T4 Free 1.5 0.9 - 1.7 ng/dL   COVID-19 & Influenza Combo    Collection Time: 07/22/23 12:44 PM    Specimen: Nasopharyngeal Swab   Result Value Ref Range    Specimen Description . NASOPHARYNGEAL SWAB     Source . NASOPHARYNGEAL SWAB     SARS-CoV-2 RNA, RT PCR Not Detected Not Detected    INFLUENZA A Not Detected Not Detected    INFLUENZA B Not Detected Not Detected   Troponin    Collection Time: 07/22/23  2:41 PM   Result Value Ref Range    Troponin, High Sensitivity 127 (HH) 0 - 14 ng/L       Imaging/Diagnostics:  XR CHEST PORTABLE  Narrative: EXAMINATION:  ONE XRAY VIEW OF THE CHEST    7/22/2023 12:08 pm    COMPARISON:  07/20/2022    HISTORY:  ORDERING SYSTEM PROVIDED HISTORY: cough  TECHNOLOGIST PROVIDED HISTORY:  cough  Reason for Exam: cough    FINDINGS:  Right-sided central venous catheter place. Status post median sternotomy. Stable cardiomegaly. Improved aeration at the lung bases. No pneumothorax. Impression: Improved bibasilar aeration    Otherwise, no acute cardiopulmonary process       Assessment :      Primary Problem  NSTEMI (non-ST elevated myocardial infarction) [facility])    Active Hospital Problems    Diagnosis Date Noted    NSTEMI (non-ST elevated myocardial infarction) ([address]) [I21.4] 07/22/2023       Plan:     Patient status Admit as inpatient in the  Progressive Unit/Step down    NSTEMI, patient currently denies any chest pain, ER physician did talk to cardiologist did not recommend any heparin drip currently, patient on Lopressor and amiodarone at home for A-fib, Lopressor currently held due to borderline low blood pressure, not on any aspirin due to allergy  We will check echocardiogram  A-fib, not on any anticoagulation due to history of GI bleed, currently rate controlled on amiodarone Lopressor held secondary to body pressure  Fatigue? Etiology not known, NSTEMI?   Patient also has lower extremity cellulitis,  Lower extremity cellulitis, no leukocytosis, will start vancomycin for now, wound care, if MRSA is negative we will start cefazolin  History of PVD, on Lipitor  History of hypothyroidism, TSH checked which was normal  COPD, currently compensated, bronchodilators resumed  DVT prophylaxis heparin  Nephrology consulted for end-stage renal disease on hemodialysis      Consultations:   IP CONSULT TO INTERNAL MEDICINE  IP CONSULT TO CARDIOLOGY  IP CONSULT TO NEPHROLOGY  IP CONSULT TO CARDIOLOGY  IP CONSULT TO NEPHROLOGY     Patient is admitted as inpatient status because of co-morbidities listed above, severity of signs and symptoms as outlined, requirement for current medical therapies and most importantly because of direct risk to patient if care not provided in a hospital setting. Denisha Salinas MD  7/22/2023  7:50 PM    Copy sent to Dr. Jose Daniel Sanchez MD    Please note that this chart was generated using voice recognition Dragon dictation software. Although every effort was made to ensure the accuracy of this automated transcription, some errors in transcription may have occurred.

## 2023-07-22 NOTE — ED PROVIDER NOTES
EMERGENCY DEPARTMENT ENCOUNTER    Pt Name: Roberto Pizarro  MRN: [id number]  [address] [facility] [place] 1950  Date of evaluation: 7/22/23  CHIEF COMPLAINT       Chief Complaint   Patient presents with    Fatigue     HISTORY OF PRESENT ILLNESS   65-year-old female presents for complaints of fatigue. Patient reports since after dialysis on Tuesday she has been having increased fatigue. She states that she just has \"no energy\" patient denies any chest pain, she reports minimal shortness of breath with some mild nausea denies any abdominal pain. She reports that she makes a very small amount of urine. She states that she did not go to dialysis today. The history is provided by the patient and a relative. REVIEW OF SYSTEMS     Review of Systems   Constitutional:  Positive for fatigue. Negative for fever. HENT:  Negative for congestion and ear pain. Eyes:  Negative for pain. Respiratory:  Negative for shortness of breath. Cardiovascular:  Negative for chest pain, palpitations and leg swelling. Gastrointestinal:  Negative for abdominal pain. Genitourinary:  Negative for dysuria and flank pain. Musculoskeletal:  Negative for back pain. Skin:  Negative for color change. Neurological:  Negative for numbness and headaches. Psychiatric/Behavioral:  Negative for confusion. All other systems reviewed and are negative.   PASTMEDICAL HISTORY     Past Medical History:   Diagnosis Date    Acute CVA (cerebrovascular accident) ([address]) 07/25/2020    Acute kidney injury superimposed on CKD ([address]) 6/8/2022    Acute on chronic combined systolic (congestive) and diastolic (congestive) heart failure ([address]) 02/06/2022    Acute respiratory failure with hypoxia and hypercapnia (HCC)     JOY (acute kidney injury) ([address]) 1/15/2022    Arthritis     Asthma     Bilateral lower extremity edema 4/20/2022    Bradycardia 06/12/2022    Chronic diastolic congestive heart failure ([address]) 2/20/2020    Chronic ulcer of left leg with fat Order Specific Question:   Skin duration of therapy     Answer:   7 days    vancomycin (VANCOCIN) intermittent dosing (placeholder)     Order Specific Question:   Antimicrobial Indications     Answer:   Skin and Soft Tissue Infection     Order Specific Question:   Skin duration of therapy     Answer:   7 days     DISCHARGE PRESCRIPTIONS:  New Prescriptions    No medications on file     PHYSICIAN CONSULTS ORDERED THIS ENCOUNTER:  IP CONSULT TO INTERNAL MEDICINE  IP CONSULT TO CARDIOLOGY  IP CONSULT TO NEPHROLOGY  PHARMACY TO DOSE VANCOMYCIN  IP CONSULT TO CARDIOLOGY  IP CONSULT TO NEPHROLOGY  FINAL IMPRESSION      1. NSTEMI (non-ST elevated myocardial infarction) ([address])          DISPOSITION/PLAN   DISPOSITION Admitted 07/22/2023 03:43:53 PM      OUTPATIENT FOLLOW UP THE PATIENT:  No follow-up provider specified.     [address], DO       [address] [facility]  07/22/23 3556

## 2023-07-22 NOTE — ED TRIAGE NOTES
Mode of arrival (squad #, walk in, police, etc) : walk-in        Chief complaint(s): fatigue        Arrival Note (brief scenario, treatment PTA, etc). : Pt reports worsening fatigue x1 week. C= \"Have you ever felt that you should Cut down on your drinking? \"  No  A= \"Have people Annoyed you by criticizing your drinking? \"  No  G= \"Have you ever felt bad or Guilty about your drinking? \"  No  E= \"Have you ever had a drink as an Eye-opener first thing in the morning to steady your nerves or to help a hangover? \"  No      Deferred []      Reason for deferring: N/A    *If yes to two or more: probable alcohol abuse. *

## 2023-07-23 LAB
MRSA, DNA, NASAL: NEGATIVE
SPECIMEN DESCRIPTION: NORMAL

## 2023-07-23 PROCEDURE — 6370000000 HC RX 637 (ALT 250 FOR IP): Performed by: INTERNAL MEDICINE

## 2023-07-23 PROCEDURE — 99233 SBSQ HOSP IP/OBS HIGH 50: CPT | Performed by: INTERNAL MEDICINE

## 2023-07-23 PROCEDURE — 94664 DEMO&/EVAL PT USE INHALER: CPT

## 2023-07-23 PROCEDURE — 6360000002 HC RX W HCPCS: Performed by: INTERNAL MEDICINE

## 2023-07-23 PROCEDURE — 94761 N-INVAS EAR/PLS OXIMETRY MLT: CPT

## 2023-07-23 PROCEDURE — 2580000003 HC RX 258: Performed by: INTERNAL MEDICINE

## 2023-07-23 PROCEDURE — 2060000000 HC ICU INTERMEDIATE R&B

## 2023-07-23 PROCEDURE — 94640 AIRWAY INHALATION TREATMENT: CPT

## 2023-07-23 RX ADMIN — AMIODARONE HYDROCHLORIDE 100 MG: 200 TABLET ORAL at 10:31

## 2023-07-23 RX ADMIN — SODIUM CHLORIDE, PRESERVATIVE FREE 10 ML: 5 INJECTION INTRAVENOUS at 10:38

## 2023-07-23 RX ADMIN — ATORVASTATIN CALCIUM 40 MG: 40 TABLET, FILM COATED ORAL at 16:51

## 2023-07-23 RX ADMIN — HEPARIN SODIUM 5000 UNITS: 5000 INJECTION INTRAVENOUS; SUBCUTANEOUS at 10:31

## 2023-07-23 RX ADMIN — HEPARIN SODIUM 5000 UNITS: 5000 INJECTION INTRAVENOUS; SUBCUTANEOUS at 22:43

## 2023-07-23 RX ADMIN — TIOTROPIUM BROMIDE INHALATION SPRAY 2 PUFF: 3.12 SPRAY, METERED RESPIRATORY (INHALATION) at 11:02

## 2023-07-23 RX ADMIN — SODIUM CHLORIDE, PRESERVATIVE FREE 10 ML: 5 INJECTION INTRAVENOUS at 22:53

## 2023-07-23 RX ADMIN — BUDESONIDE AND FORMOTEROL FUMARATE DIHYDRATE 2 PUFF: 80; 4.5 AEROSOL RESPIRATORY (INHALATION) at 11:02

## 2023-07-23 RX ADMIN — HEPARIN SODIUM 5000 UNITS: 5000 INJECTION INTRAVENOUS; SUBCUTANEOUS at 16:51

## 2023-07-23 RX ADMIN — PANTOPRAZOLE SODIUM 40 MG: 40 TABLET, DELAYED RELEASE ORAL at 10:31

## 2023-07-23 RX ADMIN — LEVOTHYROXINE SODIUM 25 MCG: 0.03 TABLET ORAL at 10:31

## 2023-07-23 ASSESSMENT — LIFESTYLE VARIABLES
HOW MANY STANDARD DRINKS CONTAINING ALCOHOL DO YOU HAVE ON A TYPICAL DAY: PATIENT DOES NOT DRINK
HOW OFTEN DO YOU HAVE A DRINK CONTAINING ALCOHOL: NEVER

## 2023-07-23 NOTE — PROGRESS NOTES
Pharmacy Note  Vancomycin Consult - Daily note   Vancomycin Therapy Day: 2  Current Dosing: pulse dosing  Current diagnosis for which MRSA is suspected/confirmed: SSTI  ONLY for suspected pneumonia or COPD: MRSA nasal swab   N/A: Non respiratory infection. .        Last Temp: 97.7  Actual Weight:   Wt Readings from Last 1 Encounters:   07/22/23 123 lb (55.8 kg)     Recent Labs     07/22/23  1240   CREATININE 5.2*     CrCl:  Patient is on dialysis. Recent Labs     07/22/23  1240   WBC 7.3     No intake or output data in the 24 hours ending 07/23/23 0936    Recent vancomycin administrations                     vancomycin (VANCOCIN) 1,500 mg in sodium chloride 0.9 % 250 mL IVPB ([id number]) (mg) 1,500 mg New Bag 07/22/23 2100                    Vancomycin Concentrations:   TROUGH:  No results for input(s): VANCOTROUGH in the last 72 hours. RANDOM:  No results for input(s): VANCORANDOM in the last 72 hours. ASSESSMENT/PLAN    1500 mg dose given yesterday. Dialysis was missed yesterday. Patient was on Tues, Thurs, Sat schedule. Check random level tomorrow AM. Will continue to monitor and dose. Pharmacy will Continue to follow. Thank you. Katina Galdamez, [facility], [facility]/PharmD  7/23/2023  9:36 AM    Intermittent Hemodialysis: Not using bayesian software for dosing  Maintenance Dosing = 10-20 mg/kg after HD sessions (max 2,000 mg per dose). Timing of concentration monitoring:  Critically ill - pre-HD level after the loading dose       Stable/stable dialysis:  pre-HD concentration after 1st or 2nd MD on dialysis 3 times/week  Pre-dialysis level Invasive MRSA Infection or Sepsis Non-Invasive Infection or Non-MRSA Infection   ? 25 mg/L Hold vancomycin dose, ? subsequent dose by 250 mg Hold vancomycin dose, ? subsequent dose by 250-500 mg   21-24 mg/L Continue current dose Decrease dose by 250 mg   15-20 mg/L Increase dose by 250 mg Continue current dose   ?  14 mg/L Increase dose by 250-500 mg Increase dose by 250 mg   Note:

## 2023-07-23 NOTE — CONSULTS
NEPHROLOGY CONSULT     Patient :  Isaias Solomon; 67 y.o. MRN# [id number]  Location:  2093/2093-01  Attending:  Solis Seymour MD  Admit Date:  7/22/2023   Hospital Day: 1      Reason for Consult: ESRD/dialysis      Chief Complaint: Confusion, fatigue difficulty speaking  History Obtained From:  patient    History of Present Illness: This is a 67 y.o. female  with a significant past medical history of Cerebrovascular accident, combined diastolic and systolic heart failure, essential hypertension, partial deafness and chronic kidney disease stage V ESRD on hemodialysis Tuesday Thursday Saturday at [address] [facility] dialysis unit under my care. Patient presented to the hospital after she was found to have lethargy confusion difficulty speaking, patient has been feeling weak fatigue poor oral intake and appetite. Patient did not go to dialysis on Saturday because of weakness and confusion.   Patient is more awake alert oriented now  Not in acute distress  Patient complains of bilateral leg pain and erythema suspicious for cellulitis patient started on antibiotics  Blood pressure stable no fever        Past Medical History:        Diagnosis Date    Acute CVA (cerebrovascular accident) ([address]) 07/25/2020    Acute kidney injury superimposed on CKD ([address]) 6/8/2022    Acute on chronic combined systolic (congestive) and diastolic (congestive) heart failure ([address]) 02/06/2022    Acute respiratory failure with hypoxia and hypercapnia (HCC)     JOY (acute kidney injury) ([address]) 1/15/2022    Arthritis     Asthma     Bilateral lower extremity edema 4/20/2022    Bradycardia 06/12/2022    Chronic diastolic congestive heart failure ([address]) 2/20/2020    Chronic ulcer of left leg with fat layer exposed ([address]) 6/21/2022    CKD stage 4 secondary to hypertension ([address]) 2/20/2020    Dependence on renal dialysis ([address])     ESRD (end stage renal disease) ([address])     Essential hypertension 07/25/2020    Gastrointestinal hemorrhage 7/20/2022    Hallucinations

## 2023-07-23 NOTE — PROGRESS NOTES
Patient admitted to PCU via bed. No SOB, no CP. She is c/o sore right lower leg and stated she missed dialysis yesterday.

## 2023-07-23 NOTE — PROGRESS NOTES
Dr. Steve White called and said she will have dialysis tomorrow and will obtain the lab work at that time with the dialysis RN and using her port.

## 2023-07-23 NOTE — PROGRESS NOTES
[address]    Progress Note  Date:   7/23/2023  Patient name:  Flaca Burciaga  Date of admission:  7/22/2023 11:22 AM  MRN:   [id number]  Account:  [de-identified]  YOB: 1950  PCP:    Carlitos Rice MD  Room:   2093/2093-01  Code Status:    Full Code    Chief Complaint:     Chief Complaint   Patient presents with    Fatigue       History Obtained From:     patient    History of Present Illness: The patient is a 67 y.o. Non- / non  female who presents with Fatigue   and she is admitted to the hospital for the management of fatigue  Patient is 78-year-old female with multiple comorbidities including history of end-stage renal disease on hemodialysis, history of COPD, history of diastolic heart failure, hypertension, PVD, A-fib not on any anticoagulation due to history of bleeding presented to the ER with fatigue.   Patient complaining of fatigue and dizziness since last couple of days, no chest pain, no shortness of breath, patient's daughter was present at the bedside, stated that patient was just very weak and fatigued, no fever or chills, no abdominal symptoms    In the ER patient was found to have troponin elevation peaked at 127, no chest pain, EKG showed A-fib, rate controlled, patient was placed on 2 L of oxygen for comfort,  Also had worsening lower extremity edema and edema, as per patient's daughter patient does have multiple episodes of cellulitis in the past, was seen by Dr. Gregorio Carlson      Past Medical History:     Past Medical History:   Diagnosis Date    Acute CVA (cerebrovascular accident) ([address]) 07/25/2020    Acute kidney injury superimposed on CKD ([address]) 6/8/2022    Acute on chronic combined systolic (congestive) and diastolic (congestive) heart failure ([address]) 02/06/2022    Acute respiratory failure with hypoxia and hypercapnia ([address])     JOY (acute kidney injury) ([address]) 1/15/2022    Arthritis     Asthma     Bilateral

## 2023-07-23 NOTE — PROGRESS NOTES
Lab was unable to draw any labs due to hard IV stick. Patient asking if we can use her port for labs.

## 2023-07-23 NOTE — ED NOTES
Notified and updated Dr Daniel Fuentes with the Troponin report and the new EKG for the patient. Ordered to give standing order Heparin and repeat EKG and Troponin at 8am tomorrow.      Aristides Whalen RN  49/41/58 [id number]

## 2023-07-23 NOTE — ED NOTES
TRANSFER - OUT REPORT:    Verbal report given to Aguila Justin RN on [address]  being transferred to 2093 for routine progression of patient care       Report consisted of patient's Situation, Background, Assessment and   Recommendations(SBAR). Information from the following report(s) ED Encounter Summary was reviewed with the receiving nurse. [place] Assessment:    Presents to emergency department  because of falls (Syncope, seizure, or loss of consciousness): No  Age > 70: Yes  Altered Mental Status, Intoxication with alcohol or substance confusion (Disorientation, impaired judgment, poor safety awaremess, or inability to follow instructions): No  Impaired Mobility: Ambulates or transfers with assistive devices or assistance; Unable to ambulate or transer.: Yes  Nursing Judgement: Yes          Lines:   Peripheral IV 07/22/23 Left;Dorsal Forearm (Active)   Site Assessment Clean, dry & intact 07/22/23 1243   Line Status Blood return noted; Flushed;Specimen collected 07/22/23 1243   Phlebitis Assessment No symptoms 07/22/23 1243   Infiltration Assessment 0 07/22/23 1243   Dressing Status New dressing applied;Clean, dry & intact 07/22/23 1243   Dressing Type Transparent 07/22/23 1243   Dressing Intervention New 07/22/23 1243       Hemodialysis Central Access Right (Active)        Opportunity for questions and clarification was provided.       Patient transported with:  Shanna Calixto RN  66/27/76 [id number]

## 2023-07-24 ENCOUNTER — APPOINTMENT (OUTPATIENT)
Dept: NUCLEAR MEDICINE | Age: 73
DRG: 699 | End: 2023-07-24
Payer: MEDICARE

## 2023-07-24 ENCOUNTER — HOSPITAL ENCOUNTER (INPATIENT)
Age: 73
Discharge: HOME | DRG: 699 | End: 2023-07-26
Attending: INTERNAL MEDICINE
Payer: MEDICARE

## 2023-07-24 ENCOUNTER — HOSPITAL ENCOUNTER (INPATIENT)
Age: 73
Discharge: HOME | DRG: 699 | End: 2023-07-26
Payer: MEDICARE

## 2023-07-24 VITALS
HEIGHT: 59 IN | SYSTOLIC BLOOD PRESSURE: 149 MMHG | WEIGHT: 123 LBS | DIASTOLIC BLOOD PRESSURE: 49 MMHG | BODY MASS INDEX: 24.8 KG/M2 | HEART RATE: 88 BPM

## 2023-07-24 LAB
ANION GAP SERPL CALCULATED.3IONS-SCNC: 13 MMOL/L (ref 9–17)
BUN SERPL-MCNC: 27 MG/DL (ref 8–23)
CALCIUM SERPL-MCNC: 9.3 MG/DL (ref 8.6–10.4)
CHLORIDE SERPL-SCNC: 97 MMOL/L (ref 98–107)
CO2 SERPL-SCNC: 27 MMOL/L (ref 20–31)
CREAT SERPL-MCNC: 3.2 MG/DL (ref 0.5–0.9)
DATE LAST DOSE: NORMAL
DATE LAST DOSE: NORMAL
ECHO AO ROOT DIAM: 3.2 CM
ECHO AO ROOT INDEX: 2.13 CM/M2
ECHO AR MAX VEL PISA: 3.5 M/S
ECHO AV AREA PEAK VELOCITY: 0.8 CM2
ECHO AV AREA VTI: 0.7 CM2
ECHO AV AREA/BSA PEAK VELOCITY: 0.5 CM2/M2
ECHO AV AREA/BSA VTI: 0.5 CM2/M2
ECHO AV MEAN GRADIENT: 11 MMHG
ECHO AV MEAN VELOCITY: 1.4 M/S
ECHO AV PEAK GRADIENT: 20 MMHG
ECHO AV PEAK VELOCITY: 2.2 M/S
ECHO AV REGURGITANT PHT: 308 MS
ECHO AV VELOCITY RATIO: 0.41
ECHO AV VTI: 45.4 CM
ECHO BSA: 1.52 M2
ECHO BSA: 1.52 M2
ECHO EST RA PRESSURE: 8 MMHG
ECHO LA AREA 2C: 25.7 CM2
ECHO LA AREA 4C: 23.8 CM2
ECHO LA DIAMETER INDEX: 2.87 CM/M2
ECHO LA DIAMETER: 4.3 CM
ECHO LA MAJOR AXIS: 6.1 CM
ECHO LA MINOR AXIS: 6.2 CM
ECHO LA TO AORTIC ROOT RATIO: 1.34
ECHO LA VOL 2C: 88 ML (ref 22–52)
ECHO LA VOL 4C: 74 ML (ref 22–52)
ECHO LA VOL BP: 80 ML (ref 22–52)
ECHO LA VOL/BSA BIPLANE: 53 ML/M2 (ref 16–34)
ECHO LA VOLUME INDEX A2C: 59 ML/M2 (ref 16–34)
ECHO LA VOLUME INDEX A4C: 49 ML/M2 (ref 16–34)
ECHO LV FRACTIONAL SHORTENING: 34 % (ref 28–44)
ECHO LV INTERNAL DIMENSION DIASTOLE INDEX: 2.53 CM/M2
ECHO LV INTERNAL DIMENSION DIASTOLIC: 3.8 CM (ref 3.9–5.3)
ECHO LV INTERNAL DIMENSION SYSTOLIC INDEX: 1.67 CM/M2
ECHO LV INTERNAL DIMENSION SYSTOLIC: 2.5 CM
ECHO LV IVSD: 1.5 CM (ref 0.6–0.9)
ECHO LV MASS 2D: 216.6 G (ref 67–162)
ECHO LV MASS INDEX 2D: 144.4 G/M2 (ref 43–95)
ECHO LV POSTERIOR WALL DIASTOLIC: 1.5 CM (ref 0.6–0.9)
ECHO LV RELATIVE WALL THICKNESS RATIO: 0.79
ECHO LVOT AREA: 2 CM2
ECHO LVOT AV VTI INDEX: 0.35
ECHO LVOT DIAM: 1.6 CM
ECHO LVOT MEAN GRADIENT: 1 MMHG
ECHO LVOT PEAK GRADIENT: 3 MMHG
ECHO LVOT PEAK VELOCITY: 0.9 M/S
ECHO LVOT STROKE VOLUME INDEX: 21.3 ML/M2
ECHO LVOT SV: 32 ML
ECHO LVOT VTI: 15.9 CM
ECHO MV AREA VTI: 0.8 CM2
ECHO MV E DECELERATION TIME (DT): 144 MS
ECHO MV E VELOCITY: 1.3 M/S
ECHO MV LVOT VTI INDEX: 2.43
ECHO MV MAX VELOCITY: 1.4 M/S
ECHO MV MEAN GRADIENT: 3 MMHG
ECHO MV MEAN VELOCITY: 0.8 M/S
ECHO MV PEAK GRADIENT: 8 MMHG
ECHO MV VTI: 38.7 CM
ECHO RA AREA 4C: 18.3 CM2
ECHO RA END SYSTOLIC VOLUME APICAL 4 CHAMBER INDEX BSA: 33 ML/M2
ECHO RA VOLUME: 49 ML
ECHO RIGHT VENTRICULAR SYSTOLIC PRESSURE (RVSP): 35 MMHG
ECHO RV TAPSE: 1.1 CM (ref 1.7–?)
ECHO TV REGURGITANT MAX VELOCITY: 2.6 M/S
ECHO TV REGURGITANT PEAK GRADIENT: 28 MMHG
GFR SERPL CREATININE-BSD FRML MDRD: 15 ML/MIN/1.73M2
GLUCOSE SERPL-MCNC: 111 MG/DL (ref 70–99)
HBV SURFACE AG SERPL QL IA: NONREACTIVE
NUC REST EJECTION FRACTION: 70 %
POTASSIUM SERPL-SCNC: 3 MMOL/L (ref 3.7–5.3)
SODIUM SERPL-SCNC: 137 MMOL/L (ref 135–144)
STRESS BASELINE DIAS BP: 49 MMHG
STRESS BASELINE HR: 87 BPM
STRESS BASELINE SYS BP: 149 MMHG
STRESS STAGE RECOVERY 1 BP: NORMAL MMHG
STRESS STAGE RECOVERY 1 COMMENTS: NORMAL
STRESS STAGE RECOVERY 1 DURATION: 1 MIN:SEC
STRESS STAGE RECOVERY 1 HR: 98 BPM
STRESS STAGE RECOVERY 2 BP: NORMAL MMHG
STRESS STAGE RECOVERY 2 DURATION: 3 MIN:SEC
STRESS STAGE RECOVERY 2 HR: 96 BPM
STRESS STAGE RECOVERY 3 BP: NORMAL MMHG
STRESS STAGE RECOVERY 3 DURATION: 5 MIN:SEC
STRESS STAGE RECOVERY 3 HR: 101 BPM
STRESS STAGE RECOVERY 4 BP: NORMAL MMHG
STRESS STAGE RECOVERY 4 COMMENTS: NORMAL
STRESS STAGE RECOVERY 4 DURATION: 7 MIN:SEC
STRESS STAGE RECOVERY 4 HR: 105 BPM
STRESS TARGET HR: 147 BPM
TID: 0.94
TME LAST DOSE: 2100 H
TME LAST DOSE: 2100 H
TROPONIN I SERPL HS-MCNC: 146 NG/L (ref 0–14)
VANCOMYCIN DOSE: 1500 MG
VANCOMYCIN DOSE: 1500 MG
VANCOMYCIN SERPL-MCNC: 16.4 UG/ML
VANCOMYCIN SERPL-MCNC: 16.4 UG/ML

## 2023-07-24 PROCEDURE — 6370000000 HC RX 637 (ALT 250 FOR IP): Performed by: INTERNAL MEDICINE

## 2023-07-24 PROCEDURE — 94761 N-INVAS EAR/PLS OXIMETRY MLT: CPT

## 2023-07-24 PROCEDURE — 2500000003 HC RX 250 WO HCPCS: Performed by: INTERNAL MEDICINE

## 2023-07-24 PROCEDURE — 99233 SBSQ HOSP IP/OBS HIGH 50: CPT | Performed by: INTERNAL MEDICINE

## 2023-07-24 PROCEDURE — 93306 TTE W/DOPPLER COMPLETE: CPT | Performed by: INTERNAL MEDICINE

## 2023-07-24 PROCEDURE — 6370000000 HC RX 637 (ALT 250 FOR IP): Performed by: NURSE PRACTITIONER

## 2023-07-24 PROCEDURE — 2580000003 HC RX 258: Performed by: INTERNAL MEDICINE

## 2023-07-24 PROCEDURE — 99232 SBSQ HOSP IP/OBS MODERATE 35: CPT | Performed by: INTERNAL MEDICINE

## 2023-07-24 PROCEDURE — 93017 CV STRESS TEST TRACING ONLY: CPT

## 2023-07-24 PROCEDURE — 6360000002 HC RX W HCPCS: Performed by: INTERNAL MEDICINE

## 2023-07-24 PROCEDURE — 78452 HT MUSCLE IMAGE SPECT MULT: CPT

## 2023-07-24 PROCEDURE — A9500 TC99M SESTAMIBI: HCPCS | Performed by: INTERNAL MEDICINE

## 2023-07-24 PROCEDURE — 5A1D70Z PERFORMANCE OF URINARY FILTRATION, INTERMITTENT, LESS THAN 6 HOURS PER DAY: ICD-10-PCS | Performed by: INTERNAL MEDICINE

## 2023-07-24 PROCEDURE — 94640 AIRWAY INHALATION TREATMENT: CPT

## 2023-07-24 PROCEDURE — 84484 ASSAY OF TROPONIN QUANT: CPT

## 2023-07-24 PROCEDURE — 87340 HEPATITIS B SURFACE AG IA: CPT

## 2023-07-24 PROCEDURE — 93005 ELECTROCARDIOGRAM TRACING: CPT

## 2023-07-24 PROCEDURE — 3430000000 HC RX DIAGNOSTIC RADIOPHARMACEUTICAL: Performed by: INTERNAL MEDICINE

## 2023-07-24 PROCEDURE — 90935 HEMODIALYSIS ONE EVALUATION: CPT

## 2023-07-24 PROCEDURE — 2060000000 HC ICU INTERMEDIATE R&B

## 2023-07-24 PROCEDURE — 36415 COLL VENOUS BLD VENIPUNCTURE: CPT

## 2023-07-24 PROCEDURE — 93306 TTE W/DOPPLER COMPLETE: CPT

## 2023-07-24 PROCEDURE — 80202 ASSAY OF VANCOMYCIN: CPT

## 2023-07-24 PROCEDURE — 80048 BASIC METABOLIC PNL TOTAL CA: CPT

## 2023-07-24 RX ORDER — METOPROLOL TARTRATE 5 MG/5ML
5 INJECTION INTRAVENOUS
Status: DISCONTINUED | OUTPATIENT
Start: 2023-07-24 | End: 2023-07-27 | Stop reason: HOSPADM

## 2023-07-24 RX ORDER — POTASSIUM CHLORIDE 20 MEQ/1
20 TABLET, EXTENDED RELEASE ORAL ONCE
Status: COMPLETED | OUTPATIENT
Start: 2023-07-24 | End: 2023-07-24

## 2023-07-24 RX ORDER — ALBUTEROL SULFATE 90 UG/1
2 AEROSOL, METERED RESPIRATORY (INHALATION)
Status: DISCONTINUED | OUTPATIENT
Start: 2023-07-24 | End: 2023-07-27 | Stop reason: HOSPADM

## 2023-07-24 RX ORDER — AMINOPHYLLINE DIHYDRATE 25 MG/ML
500 INJECTION, SOLUTION INTRAVENOUS
Status: DISCONTINUED | OUTPATIENT
Start: 2023-07-24 | End: 2023-07-27 | Stop reason: HOSPADM

## 2023-07-24 RX ORDER — TETRAKIS(2-METHOXYISOBUTYLISOCYANIDE)COPPER(I) TETRAFLUOROBORATE 1 MG/ML
16.7 INJECTION, POWDER, LYOPHILIZED, FOR SOLUTION INTRAVENOUS
Status: COMPLETED | OUTPATIENT
Start: 2023-07-24 | End: 2023-07-24

## 2023-07-24 RX ORDER — ASPIRIN 81 MG/1
81 TABLET ORAL DAILY
Status: DISCONTINUED | OUTPATIENT
Start: 2023-07-24 | End: 2023-07-26 | Stop reason: HOSPADM

## 2023-07-24 RX ORDER — NITROGLYCERIN 0.4 MG/1
0.4 TABLET SUBLINGUAL
Status: DISCONTINUED | OUTPATIENT
Start: 2023-07-24 | End: 2023-07-27 | Stop reason: HOSPADM

## 2023-07-24 RX ORDER — LANOLIN ALCOHOL/MO/W.PET/CERES
3 CREAM (GRAM) TOPICAL NIGHTLY PRN
Status: DISCONTINUED | OUTPATIENT
Start: 2023-07-24 | End: 2023-07-26 | Stop reason: HOSPADM

## 2023-07-24 RX ORDER — SODIUM CHLORIDE 0.9 % (FLUSH) 0.9 %
10 SYRINGE (ML) INJECTION PRN
Status: DISCONTINUED | OUTPATIENT
Start: 2023-07-24 | End: 2023-07-26 | Stop reason: HOSPADM

## 2023-07-24 RX ORDER — TETRAKIS(2-METHOXYISOBUTYLISOCYANIDE)COPPER(I) TETRAFLUOROBORATE 1 MG/ML
37.5 INJECTION, POWDER, LYOPHILIZED, FOR SOLUTION INTRAVENOUS
Status: COMPLETED | OUTPATIENT
Start: 2023-07-24 | End: 2023-07-24

## 2023-07-24 RX ORDER — REGADENOSON 0.08 MG/ML
0.4 INJECTION, SOLUTION INTRAVENOUS
Status: COMPLETED | OUTPATIENT
Start: 2023-07-24 | End: 2023-07-24

## 2023-07-24 RX ORDER — 0.9 % SODIUM CHLORIDE 0.9 %
500 INTRAVENOUS SOLUTION INTRAVENOUS
Status: DISCONTINUED | OUTPATIENT
Start: 2023-07-24 | End: 2023-07-27 | Stop reason: HOSPADM

## 2023-07-24 RX ORDER — ATROPINE SULFATE 0.1 MG/ML
0.5 INJECTION INTRAVENOUS PRN
Status: DISCONTINUED | OUTPATIENT
Start: 2023-07-24 | End: 2023-07-27 | Stop reason: HOSPADM

## 2023-07-24 RX ADMIN — ASPIRIN 81 MG: 81 TABLET, COATED ORAL at 12:17

## 2023-07-24 RX ADMIN — SODIUM CHLORIDE, PRESERVATIVE FREE 10 ML: 5 INJECTION INTRAVENOUS at 20:15

## 2023-07-24 RX ADMIN — Medication 37.5 MILLICURIE: at 12:55

## 2023-07-24 RX ADMIN — TIOTROPIUM BROMIDE INHALATION SPRAY 2 PUFF: 3.12 SPRAY, METERED RESPIRATORY (INHALATION) at 07:55

## 2023-07-24 RX ADMIN — SODIUM CHLORIDE, PRESERVATIVE FREE 10 ML: 5 INJECTION INTRAVENOUS at 14:00

## 2023-07-24 RX ADMIN — HEPARIN SODIUM 5000 UNITS: 5000 INJECTION INTRAVENOUS; SUBCUTANEOUS at 18:02

## 2023-07-24 RX ADMIN — LEVOTHYROXINE SODIUM 25 MCG: 0.03 TABLET ORAL at 04:43

## 2023-07-24 RX ADMIN — BUDESONIDE AND FORMOTEROL FUMARATE DIHYDRATE 2 PUFF: 80; 4.5 AEROSOL RESPIRATORY (INHALATION) at 07:55

## 2023-07-24 RX ADMIN — BUDESONIDE AND FORMOTEROL FUMARATE DIHYDRATE 2 PUFF: 80; 4.5 AEROSOL RESPIRATORY (INHALATION) at 18:54

## 2023-07-24 RX ADMIN — Medication 16.7 MILLICURIE: at 09:52

## 2023-07-24 RX ADMIN — HEPARIN SODIUM 5000 UNITS: 5000 INJECTION INTRAVENOUS; SUBCUTANEOUS at 22:49

## 2023-07-24 RX ADMIN — HEPARIN SODIUM 5000 UNITS: 5000 INJECTION INTRAVENOUS; SUBCUTANEOUS at 12:18

## 2023-07-24 RX ADMIN — PANTOPRAZOLE SODIUM 40 MG: 40 TABLET, DELAYED RELEASE ORAL at 04:43

## 2023-07-24 RX ADMIN — VANCOMYCIN HYDROCHLORIDE 750 MG: 750 INJECTION, POWDER, LYOPHILIZED, FOR SOLUTION INTRAVENOUS at 18:02

## 2023-07-24 RX ADMIN — MIDODRINE HYDROCHLORIDE 5 MG: 5 TABLET ORAL at 14:14

## 2023-07-24 RX ADMIN — ATORVASTATIN CALCIUM 40 MG: 40 TABLET, FILM COATED ORAL at 18:03

## 2023-07-24 RX ADMIN — AMIODARONE HYDROCHLORIDE 100 MG: 200 TABLET ORAL at 12:17

## 2023-07-24 RX ADMIN — Medication 3 MG: at 22:46

## 2023-07-24 RX ADMIN — Medication 1.6 ML: at 15:17

## 2023-07-24 RX ADMIN — Medication 1.6 ML: at 17:18

## 2023-07-24 RX ADMIN — REGADENOSON 0.4 MG: 0.08 INJECTION, SOLUTION INTRAVENOUS at 09:49

## 2023-07-24 RX ADMIN — POTASSIUM CHLORIDE 20 MEQ: 1500 TABLET, EXTENDED RELEASE ORAL at 22:47

## 2023-07-24 NOTE — CONSULTS
7/13/23   Jose Daniel Sanchez MD   calcium acetate (PHOSLO) 667 MG CAPS capsule Take 2 capsules by mouth 3 times daily TAKE 1 CAPSULE BY MOUTH THREE TIMES DAILY WITH MEALS 7/11/23   Katherine Tomlin MD   calcium acetate (PHOSLO) 667 MG CAPS capsule Take 2 capsules by mouth 3 times daily (with meals) 7/11/23   Katherine Tomlin MD   amiodarone (PACERONE) 100 MG tablet Take 1 tablet by mouth every morning 7/11/23   Jose Daniel Sanchez MD   hydrOXYzine HCl (ATARAX) 50 MG tablet Take 1 tablet by mouth as needed for Anxiety 1 tablet before dialysis 7/11/23   Jose Daniel Sanchez MD   midodrine (PROAMATINE) 5 MG tablet Take 1 tablet by mouth 2 times daily as needed (if low BP below 115/65, take to dialysis) 7/11/23   Jose Daniel Sanchez MD   furosemide (LASIX) 80 MG tablet Take 1 tablet by mouth every other day Water pill, does not take on dialysis days. 7/11/23   Jose Daniel Sanchez MD   atorvastatin (LIPITOR) 40 MG tablet Take 1 tablet by mouth every evening Take 1 tablet by mouth once daily 7/11/23   Jose Daniel Sanchez MD   omeprazole (PRILOSEC) 20 MG delayed release capsule TAKE 1 CAPSULE BY MOUTH ONCE DAILY IN THE MORNING BEFORE BREAKFAST 7/11/23   Jose Daniel Sanchez MD   metoprolol succinate (TOPROL XL) 25 MG extended release tablet Take 1 tablet by mouth every evening Hold if BP bellow 115/65. Stop metoprolol tartrate 7/5/23   Beck Brandon MD   mupirocin (BACTROBAN) 2 % ointment Apply topically 3 times daily x 10 days.  7/5/23   Beck Brandon MD   desloratadine (CLARINEX) 5 MG tablet Take 1 tablet by mouth once daily 6/20/23   Jose Daniel Sanchez MD   lidocaine (LIDODERM) 5 % Place 1 patch onto the skin daily 12 hours on, 12 hours off. 5/10/23   Jose Daniel Sanchez MD   acetaminophen (TYLENOL) 500 MG tablet 1 Orally as needed    Historical Provider, MD   Elastic Bandages & Supports (KNEE BRACE/FLEX STAYS MEDIUM) MISC as directed 2/15/23   Historical Provider, MD   chlorhexidine gluconate (ANTISEPTIC carotid upstroke is normal in amplitude and contour without delay or bruit  Peripheral pulses are symmetrical and full   Abdomen:  No masses or tenderness  Bowel sounds present  Extremities:   No Cyanosis or Clubbing   Lower extremity edema: 1+   Skin: Warm and dry  Neurological:  Alert and oriented. Moves all extremities well  No abnormalities of mood, affect, memory, mentation, or behavior are noted    DATA:    Diagnostics:      EKG: AF, 78 bpm; IVCD, LVH and chronic non-specific ST and T wave abnormality    2D ECHO ( 2/7/22)  Summary  Normal left ventricle size and function with an estimated EF > 55%. No segmental wall motion abnormalities seen. Moderate left ventricular hypertrophy. Normal right ventricular size and function. Left atrial dilatation. CBC:   Recent Labs     07/22/23  1240   WBC 7.3   HGB 12.3   HCT 36.9        BMP:   Recent Labs     07/22/23  1240      K 4.1   CO2 28   BUN 51*   CREATININE 5.2*   LABGLOM 8*   GLUCOSE 157*     BNP: No results for input(s): BNP in the last 72 hours. PT/INR:   Recent Labs     07/22/23  1240   PROTIME 12.9   INR 0.9     APTT:No results for input(s): APTT in the last 72 hours. CARDIAC ENZYMES:No results for input(s): CKTOTAL, CKMB, CKMBINDEX, TROPONINI in the last 72 hours. FASTING LIPID PANEL:  Lab Results   Component Value Date/Time    HDL 46 09/19/2022 05:40 AM    TRIG 144 09/19/2022 05:40 AM     LIVER PROFILE:  Recent Labs     07/22/23  1240   AST 19   ALT 10   LABALBU 3.7         IMPRESSION:    Fatigue and lightheadedness  ESRD with chronically elevated troponin, stable; no chest pain or EKG changes noted. Stable CAD, h/o CABG in 2003  Essential HTN with preserved LVEF by echo in 2022  Chronic AF; not on anticoagulation due to recurrent bleeding  HLP  7.    COPD  Patient Active Problem List   Diagnosis    Vitamin D deficiency    Venous insufficiency of both lower extremities    Type 2 diabetes mellitus with chronic kidney disease on

## 2023-07-24 NOTE — PLAN OF CARE
Problem: Discharge Planning  Goal: Discharge to home or other facility with appropriate resources  7/24/2023 0128 by Adriana Amezcua RN  Outcome: Progressing  7/23/2023 1800 by Richard Rosales RN  Outcome: Progressing     Problem: Safety - Adult  Goal: Free from fall injury  7/24/2023 0128 by Adriana Amezcua RN  Outcome: Progressing  Note: Patient [place] and visually impaired. Weak. Bed alarm on. Using call light appropriately. 7/23/2023 1800 by Richard Rosales RN  Outcome: Progressing     Problem: ABCDS Injury Assessment  Goal: Absence of physical injury  7/24/2023 0128 by Adriana Amezcua RN  Outcome: Progressing  7/23/2023 1800 by Richard Rosales RN  Outcome: Progressing     Problem: Skin/Tissue Integrity  Goal: Absence of new skin breakdown  Description: 1. Monitor for areas of redness and/or skin breakdown  2. Assess vascular access sites hourly  3. Every 4-6 hours minimum:  Change oxygen saturation probe site  4. Every 4-6 hours:  If on nasal continuous positive airway pressure, respiratory therapy assess nares and determine need for appliance change or resting period. 7/24/2023 0128 by Adriana Amezcua RN  Outcome: Progressing  Note: Skin tear right calf. Bilateral lower legs reddened. Assisted with repositioning.  7/23/2023 1800 by Richard Rosales RN  Outcome: Progressing     Problem: Respiratory - Adult  Goal: Achieves optimal ventilation and oxygenation  Outcome: Progressing  Note: Lungs clear but decreased     Problem: Cardiovascular - Adult  Goal: Maintains optimal cardiac output and hemodynamic stability  Outcome: Progressing  Note: Telemetry a fib controlled. Meds given ordered. VS stable. SQ heparin given as ordeed.

## 2023-07-24 NOTE — PROGRESS NOTES
[address] [facility]   OCCUPATIONAL THERAPY MISSED TREATMENT NOTE   INPATIENT   Date: 23  Patient Name: Ellen Enrique       Room: 5171/0218-24  MRN: [id number]   Account #: [de-identified]    : 1950  (67 y.o.)  Gender: female                 REASON FOR MISSED TREATMENT:  23    -   Testing - pt OOR for stress test. OT will continue to follow and check pt status in PM as time permits.     1105       Electronically signed by HELENE Novak on 23 at 11:10 AM EDT

## 2023-07-24 NOTE — PROGRESS NOTES
RN spoke with daughter Maritza Silva, daughter updated on today's testing. Does give verbal consent for patient to have stress test done. Will be coming to see patient today.

## 2023-07-24 NOTE — ACP (ADVANCE CARE PLANNING)
Advance Care Planning     Advance Care Planning Activator (Inpatient)  Conversation Note      Date of ACP Conversation: 7/24/2023     Conversation Conducted with: Patient with Decision Making Capacity    ACP Activator: Cyndi Leone RN      Health Care Decision Maker:     Current Designated Health Care Decision Maker:     Primary Decision Maker: Peterson Callas - Child - [phone]    Primary Decision Maker: Jose Angel Nation - [phone]    Primary Decision Maker: Gerard Members Child - [phone]    Primary Decision Maker: Charisse Mas Child - [phone]    Today we documented Decision Maker(s) consistent with Legal Next of Kin hierarchy. Care Preferences    Ventilation: \"If you were in your present state of health and suddenly became very ill and were unable to breathe on your own, what would your preference be about the use of a ventilator (breathing machine) if it were available to you? \"      Would the patient desire the use of ventilator (breathing machine)?: yes    \"If your health worsens and it becomes clear that your chance of recovery is unlikely, what would your preference be about the use of a ventilator (breathing machine) if it were available to you? \"     Would the patient desire the use of ventilator (breathing machine)?: Yes      Resuscitation  \"CPR works best to restart the heart when there is a sudden event, like a heart attack, in someone who is otherwise healthy. Unfortunately, CPR does not typically restart the heart for people who have serious health conditions or who are very sick. \"    \"In the event your heart stopped as a result of an underlying serious health condition, would you want attempts to be made to restart your heart (answer \"yes\" for attempt to resuscitate) or would you prefer a natural death (answer \"no\" for do not attempt to resuscitate)? \" yes       [] Yes   [] No   Educated Patient / Elray Glenroy regarding differences between Advance Directives and portable DNR
Statement Selected

## 2023-07-24 NOTE — PROGRESS NOTES
HEMODIALYSIS POST TREATMENT NOTE    Treatment time ordered: 195    Actual treatment time: 195    UltraFiltration Goal:   UltraFiltration Removed:       Pre Treatment weight: 55.8  Post Treatment weight: 54.5  Estimated Dry Weight: 56    Access used:     Central Venous Catheter:          Tunneled or Non-tunneled: Tunneled           Site: Rt Chest          Access Flow: goood       Sign and symptoms of infection: No       If YES: Na    Medications or blood products given: None    Chronic outpatient schedule: TTS    Chronic outpatient unit: [facility]    Summary of response to treatment: Pt tolerated Tx well and had a complete Tx    Explain if orders NOT met, was physician notified:varsha      ACES flowsheet faxed to patient unit/ placed in patient chart: Yes    Post assessment completed: yes    Report given to: Ysabel documented Safety Checks include the followin) Access and face visible at all times. 2) All connections and blood lines are secure with no kinks. 3) NVL alarm engaged. 4) Hemosafe device applied (if applicable). 5) No collapse of Arterial or Venous blood chambers. 6) All blood lines / pump segments in the air detectors.

## 2023-07-24 NOTE — PROGRESS NOTES
Hepatitis status:                           Date Drawn                             Result  Hepatitis B Surface Antigen 07/20/23     neg                     Hepatitis B Surface Antibody 07/20/23 neg        Hepatitis B Core Antibody 07/20/23 neg              From [facility] fax of Pt records.

## 2023-07-24 NOTE — CARE COORDINATION
ADLs/IADLs, Assistance with the following:, Dressing, Bathing, Cooking, Shopping, Housework    PT AM-PAC:   /24  OT AM-PAC:   /24    Family can provide assistance at DC: Yes  Would you like Case Management to discuss the discharge plan with any other family members/significant others, and if so, who? Yes (family)  Plans to Return to Present Housing: Yes  Other Identified Issues/Barriers to RETURNING to current housing: no barriers  Potential Assistance needed at discharge: N/A            Potential DME:  none  Patient expects to discharge to: [address] [place] [address] for transportation at discharge: Family    Financial    Payor: Justo Mccordangeline / Plan: MEDICARE PART A AND B / Product Type: *No Product type* /     Does insurance require precert for SNF: No    Potential assistance Purchasing Medications: No  Meds-to-Beds request:  no      [facility] [address] [phone]  [address] [facility]  [address] [place] [address]  Phone: [phone] Fax: [phone]    [facility] [address] [phone] [facility] [phone]  [address] [place] [address]  [address] [place] [address]  Phone: [phone] Fax: [phone]      Notes:    Factors facilitating achievement of predicted outcomes: Family support, Motivated, Cooperative, Good insight into deficits, Has needed Durable Medical Equipment at home, and Knowledge about rehab    Barriers to discharge: Long standing deficits    Additional Case Management Notes: 7/24/2023 Medicare/[facility]; from home with son; DME lift chair, transport WC, cane, walker, BSC, oxygen 2L prn (Jules Saldana); VNS denies needs; Katia KESSLER T-TH-S @ 1130; stress test     The Plan for Transition of Care is related to the following treatment goals of NSTEMI (non-ST elevated myocardial infarction) ([address]) [P73.6]    IF APPLICABLE: The Patient and/or patient representative Natacha Novak and her family were provided with a choice of provider and agrees with the discharge plan. Freedom of choice list with basic dialogue that supports the patient's individualized plan of care/goals and shares the quality data associated with the providers was provided to: Patient         The Patient and/or Patient Representative Agree with the Discharge Plan?  Yes    Cyndi Leone RN  Case Management Department  Ph: [phone] Fax: [phone]

## 2023-07-24 NOTE — PROGRESS NOTES
Pharmacy Note  Vancomycin Consult - Daily note   Vancomycin Therapy Day: 3  Current Dosing: HD dosing  Current diagnosis for which MRSA is suspected/confirmed: SSTI  ONLY for suspected pneumonia or COPD: MRSA nasal swab   N/A: Non respiratory infection. .        Last Temp: 97.8 F  Actual Weight:   Wt Readings from Last 1 Encounters:   07/24/23 123 lb 0.3 oz (55.8 kg)     Recent Labs     07/22/23  1240 07/24/23  1530   CREATININE 5.2* 3.2*     CrCl:  Patient is on dialysis. Recent Labs     07/22/23  1240   WBC 7.3       Intake/Output Summary (Last 24 hours) at 7/24/2023 1613  Last data filed at 7/24/2023 0810  Gross per 24 hour   Intake 180 ml   Output 0 ml   Net 180 ml       Recent vancomycin administrations                     vancomycin (VANCOCIN) 1,500 mg in sodium chloride 0.9 % 250 mL IVPB ([id number]) (mg) 1,500 mg New Bag 07/22/23 2100                    Vancomycin Concentrations:   TROUGH:  No results for input(s): VANCOTROUGH in the last 72 hours. RANDOM:    Recent Labs     07/24/23  1530   VANCORANDOM 16.4       ASSESSMENT/PLAN    Patient refused morning level then had stress test followed by dialysis session. Pre-dialysis lab was not drawn but I was able to ask dialysis to draw lab during dialysis. Will give a 750 mg dose today to keep patient covered and will schedule another level for tomorrow 07/25 0600. Pharmacy will Continue to follow. Thank you. [address] [place] [address],   7/24/2023  4:13 PM    Intermittent Hemodialysis: Not using bayesian software for dosing  Maintenance Dosing = 10-20 mg/kg after HD sessions (max 2,000 mg per dose).   Timing of concentration monitoring:  Critically ill - pre-HD level after the loading dose       Stable/stable dialysis:  pre-HD concentration after 1st or 2nd MD on dialysis 3 times/week  Pre-dialysis level Invasive MRSA Infection or Sepsis Non-Invasive Infection or Non-MRSA Infection   ? 25 mg/L Hold vancomycin dose, ? subsequent dose by 250 mg Hold

## 2023-07-24 NOTE — PROGRESS NOTES
Physical Therapy        Physical Therapy Cancel Note      DATE: 2023    NAME: Shreya Ramirez  MRN: [id number]   : 1950      Patient not seen this date for Physical Therapy due to:     Other: out of room for stress test, will check again tomorrow      Electronically signed by Tita aGry PT on 2023 at 1:38 PM

## 2023-07-24 NOTE — PROGRESS NOTES
[address]    Progress Note  Date:   7/24/2023  Patient name:  Francisco Javier Farooq  Date of admission:  7/22/2023 11:22 AM  MRN:   [id number]  Account:  [de-identified]  YOB: 1950  PCP:    Lois Deleon MD  Room:   2093/2093-01  Code Status:    Full Code    Chief Complaint:     Chief Complaint   Patient presents with    Fatigue       History Obtained From:     patient    History of Present Illness: The patient is a 67 y.o. Non- / non  female who presents with Fatigue   and she is admitted to the hospital for the management of fatigue  Patient is 50-year-old female with multiple comorbidities including history of end-stage renal disease on hemodialysis, history of COPD, history of diastolic heart failure, hypertension, PVD, A-fib not on any anticoagulation due to history of bleeding presented to the ER with fatigue.   Patient complaining of fatigue and dizziness since last couple of days, no chest pain, no shortness of breath, patient's daughter was present at the bedside, stated that patient was just very weak and fatigued, no fever or chills, no abdominal symptoms    In the ER patient was found to have troponin elevation peaked at 127, no chest pain, EKG showed A-fib, rate controlled, patient was placed on 2 L of oxygen for comfort,  Also had worsening lower extremity edema and edema, as per patient's daughter patient does have multiple episodes of cellulitis in the past, was seen by Dr. Milind Harrison      Past Medical History:     Past Medical History:   Diagnosis Date    Acute CVA (cerebrovascular accident) ([address]) 07/25/2020    Acute kidney injury superimposed on CKD ([address]) 6/8/2022    Acute on chronic combined systolic (congestive) and diastolic (congestive) heart failure ([address]) 02/06/2022    Acute respiratory failure with hypoxia and hypercapnia ([address])     JOY (acute kidney injury) ([address]) 1/15/2022    Arthritis     Asthma     Bilateral leg, erythema and warmth present psych: normal affect     Investigations:      Laboratory Testing:  Recent Results (from the past 24 hour(s))   Transthoracic echocardiogram (TTE) complete with contrast, bubble, strain, and 3D PRN    Collection Time: 07/24/23 10:49 AM   Result Value Ref Range    LA Minor Axis 6.2 cm    LA Major [place] 6.1 cm    LA Area 2C 25.7 cm2    LA Area 4C 23.8 cm2    LA Volume 2C 88 (A) 22 - 52 mL    LA Volume 4C 74 (A) 22 - 52 mL    LA Volume BP 80 (A) 22 - 52 mL    LA Diameter 4.3 cm    RA Area 4C 18.3 cm2    RA Volume 49 ml    AR .0 ms    AR Max Velocity PISA 3.5 m/s    AV Peak Velocity 2.2 m/s    AV Peak Gradient 20 mmHg    AV Mean Gradient 11 mmHg    AV VTI 45.4 cm    AV Mean Velocity 1.4 m/s    AV Area by VTI 0.7 cm2    AV Area by Peak Velocity 0.8 cm2    Aortic Root 3.2 cm    IVSd 1.5 (A) 0.6 - 0.9 cm    LVIDd 3.8 3.9 - 5.3 cm    LVIDs 2.5 cm    LVOT Diameter 1.6 cm    LVOT Mean Gradient 1 mmHg    LVOT VTI 15.9 cm    LVOT Peak Velocity 0.9 m/s    LVOT Peak Gradient 3 mmHg    LVPWd 1.5 (A) 0.6 - 0.9 cm    LVOT Area 2.0 cm2    LVOT SV 32.0 ml    MV E Wave Deceleration Time 144.0 ms    MV E Velocity 1.30 m/s    MV Mean Gradient 3 mmHg    MV VTI 38.7 cm    MV Mean Velocity 0.8 m/s    MV Max Velocity 1.4 m/s    MV Peak Gradient 8 mmHg    MV Area by VTI 0.8 cm2    TAPSE 1.1 (A) 1.7 cm    TR Max Velocity 2.60 m/s    TR Peak Gradient 28 mmHg    Body Surface Area 1.52 m2    Fractional Shortening 2D 34 28 - 44 %    LVIDd Index 2.53 cm/m2    LVIDs Index 1.67 cm/m2    LV RWT Ratio 0.79     LV Mass 2D 216.6 (A) 67 - 162 g    LV Mass 2D Index 144.4 (A) 43 - 95 g/m2    LA Volume Index BP 53 (A) 16 - 34 ml/m2    LVOT Stroke Volume Index 21.3 mL/m2    LA Volume Index 2C 59 (A) 16 - 34 mL/m2    LA Volume Index 4C 49 (A) 16 - 34 mL/m2    LA Size Index 2.87 cm/m2    LA/AO Root Ratio 1.34     RA Volume Index A4C 33 mL/m2    Ao Root Index 2.13 cm/m2    AV Velocity Ratio 0.41     LVOT:AV VTI Index 0.35

## 2023-07-25 LAB
ANION GAP SERPL CALCULATED.3IONS-SCNC: 13 MMOL/L (ref 9–17)
BACTERIA URNS QL MICRO: ABNORMAL
BILIRUB UR QL STRIP: NEGATIVE
BUN SERPL-MCNC: 26 MG/DL (ref 8–23)
CALCIUM SERPL-MCNC: 9.2 MG/DL (ref 8.6–10.4)
CASTS #/AREA URNS LPF: ABNORMAL /LPF
CHLORIDE SERPL-SCNC: 95 MMOL/L (ref 98–107)
CLARITY UR: ABNORMAL
CO2 SERPL-SCNC: 27 MMOL/L (ref 20–31)
COLOR UR: YELLOW
CREAT SERPL-MCNC: 3.8 MG/DL (ref 0.5–0.9)
DATE LAST DOSE: NORMAL
EPI CELLS #/AREA URNS HPF: ABNORMAL /HPF
GFR SERPL CREATININE-BSD FRML MDRD: 12 ML/MIN/1.73M2
GLUCOSE SERPL-MCNC: 89 MG/DL (ref 70–99)
GLUCOSE UR STRIP-MCNC: ABNORMAL MG/DL
HGB UR QL STRIP.AUTO: ABNORMAL
KETONES UR STRIP-MCNC: ABNORMAL MG/DL
LEUKOCYTE ESTERASE UR QL STRIP: ABNORMAL
METER GLUCOSE: 144 MG/DL (ref 65–105)
NITRITE UR QL STRIP: NEGATIVE
PH UR STRIP: 7.5 [PH] (ref 5–8)
POTASSIUM SERPL-SCNC: 3.7 MMOL/L (ref 3.7–5.3)
PROT UR STRIP-MCNC: ABNORMAL MG/DL
RBC #/AREA URNS HPF: ABNORMAL /HPF
SODIUM SERPL-SCNC: 135 MMOL/L (ref 135–144)
SP GR UR STRIP: 1.01 (ref 1–1.03)
TME LAST DOSE: 1802 H
UROBILINOGEN UR STRIP-ACNC: NORMAL EU/DL (ref 0–1)
VANCOMYCIN DOSE: NORMAL MG
VANCOMYCIN SERPL-MCNC: 29.7 UG/ML
WBC #/AREA URNS HPF: ABNORMAL /HPF

## 2023-07-25 PROCEDURE — 2580000003 HC RX 258: Performed by: INTERNAL MEDICINE

## 2023-07-25 PROCEDURE — 87186 SC STD MICRODIL/AGAR DIL: CPT

## 2023-07-25 PROCEDURE — 2500000003 HC RX 250 WO HCPCS: Performed by: INTERNAL MEDICINE

## 2023-07-25 PROCEDURE — 6370000000 HC RX 637 (ALT 250 FOR IP): Performed by: INTERNAL MEDICINE

## 2023-07-25 PROCEDURE — 99232 SBSQ HOSP IP/OBS MODERATE 35: CPT | Performed by: INTERNAL MEDICINE

## 2023-07-25 PROCEDURE — 90935 HEMODIALYSIS ONE EVALUATION: CPT

## 2023-07-25 PROCEDURE — 80202 ASSAY OF VANCOMYCIN: CPT

## 2023-07-25 PROCEDURE — 6370000000 HC RX 637 (ALT 250 FOR IP): Performed by: NURSE PRACTITIONER

## 2023-07-25 PROCEDURE — 80048 BASIC METABOLIC PNL TOTAL CA: CPT

## 2023-07-25 PROCEDURE — 94640 AIRWAY INHALATION TREATMENT: CPT

## 2023-07-25 PROCEDURE — 94761 N-INVAS EAR/PLS OXIMETRY MLT: CPT

## 2023-07-25 PROCEDURE — 87088 URINE BACTERIA CULTURE: CPT

## 2023-07-25 PROCEDURE — 87086 URINE CULTURE/COLONY COUNT: CPT

## 2023-07-25 PROCEDURE — 36415 COLL VENOUS BLD VENIPUNCTURE: CPT

## 2023-07-25 PROCEDURE — 2060000000 HC ICU INTERMEDIATE R&B

## 2023-07-25 PROCEDURE — 81001 URINALYSIS AUTO W/SCOPE: CPT

## 2023-07-25 PROCEDURE — 6360000002 HC RX W HCPCS: Performed by: INTERNAL MEDICINE

## 2023-07-25 PROCEDURE — 99233 SBSQ HOSP IP/OBS HIGH 50: CPT | Performed by: INTERNAL MEDICINE

## 2023-07-25 PROCEDURE — 82947 ASSAY GLUCOSE BLOOD QUANT: CPT

## 2023-07-25 RX ADMIN — SODIUM CHLORIDE, PRESERVATIVE FREE 10 ML: 5 INJECTION INTRAVENOUS at 08:33

## 2023-07-25 RX ADMIN — AMIODARONE HYDROCHLORIDE 100 MG: 200 TABLET ORAL at 08:32

## 2023-07-25 RX ADMIN — ONDANSETRON 4 MG: 2 INJECTION INTRAMUSCULAR; INTRAVENOUS at 15:58

## 2023-07-25 RX ADMIN — TIOTROPIUM BROMIDE INHALATION SPRAY 2 PUFF: 3.12 SPRAY, METERED RESPIRATORY (INHALATION) at 08:16

## 2023-07-25 RX ADMIN — ATORVASTATIN CALCIUM 40 MG: 40 TABLET, FILM COATED ORAL at 20:50

## 2023-07-25 RX ADMIN — HEPARIN SODIUM 5000 UNITS: 5000 INJECTION INTRAVENOUS; SUBCUTANEOUS at 23:17

## 2023-07-25 RX ADMIN — Medication 1.6 ML: at 17:48

## 2023-07-25 RX ADMIN — SODIUM CHLORIDE, PRESERVATIVE FREE 10 ML: 5 INJECTION INTRAVENOUS at 20:51

## 2023-07-25 RX ADMIN — BUDESONIDE AND FORMOTEROL FUMARATE DIHYDRATE 2 PUFF: 80; 4.5 AEROSOL RESPIRATORY (INHALATION) at 08:17

## 2023-07-25 RX ADMIN — BUDESONIDE AND FORMOTEROL FUMARATE DIHYDRATE 2 PUFF: 80; 4.5 AEROSOL RESPIRATORY (INHALATION) at 20:12

## 2023-07-25 RX ADMIN — ASPIRIN 81 MG: 81 TABLET, COATED ORAL at 08:32

## 2023-07-25 RX ADMIN — PANTOPRAZOLE SODIUM 40 MG: 40 TABLET, DELAYED RELEASE ORAL at 05:09

## 2023-07-25 RX ADMIN — Medication 3 MG: at 20:50

## 2023-07-25 RX ADMIN — HEPARIN SODIUM 5000 UNITS: 5000 INJECTION INTRAVENOUS; SUBCUTANEOUS at 08:32

## 2023-07-25 RX ADMIN — ACETAMINOPHEN 650 MG: 325 TABLET ORAL at 20:50

## 2023-07-25 RX ADMIN — LEVOTHYROXINE SODIUM 25 MCG: 0.03 TABLET ORAL at 05:09

## 2023-07-25 ASSESSMENT — PAIN DESCRIPTION - LOCATION: LOCATION: GENERALIZED

## 2023-07-25 ASSESSMENT — PAIN DESCRIPTION - DESCRIPTORS: DESCRIPTORS: ACHING

## 2023-07-25 ASSESSMENT — PAIN SCALES - GENERAL
PAINLEVEL_OUTOF10: 2
PAINLEVEL_OUTOF10: 0
PAINLEVEL_OUTOF10: 4

## 2023-07-25 ASSESSMENT — ENCOUNTER SYMPTOMS
ABDOMINAL PAIN: 0
NAUSEA: 0
VOMITING: 0
SHORTNESS OF BREATH: 0
DIARRHEA: 0
CHEST TIGHTNESS: 0
CONSTIPATION: 0
COUGH: 0

## 2023-07-25 NOTE — PLAN OF CARE
Problem: Safety - Adult  Goal: Free from fall injury  Outcome: Progressing  Note: Patient remains free from injury. Problem: Respiratory - Adult  Goal: Achieves optimal ventilation and oxygenation  Outcome: Progressing  Note: Patient breathing easily, no signs of distress. Vitals stable.

## 2023-07-25 NOTE — PROGRESS NOTES
[address]    PROGRESS NOTE             7/25/2023    9:54 AM    Name:   Cynthia Vigil  MRN:     [id number]     Acct:      [de-identified]   Room:   2093/2093-01   Day:  3  Admit Date:  7/22/2023 11:22 AM    PCP:  Cathleen Dia MD  Code Status:  Full Code    Subjective:     C/C:   Chief Complaint   Patient presents with    Fatigue     Interval History Status: not changed. Patient seen and examined at bedside, daughter present during encounter. She is alert and oriented x3 but sylviahter says pt still gets intermittently confused and is not at her baseline. Apparently PCP was concerned for Alzheimer's. Will check UA for other sources of infection, as pt does still produce some urine. Brief History:        The patient is a 67 y.o. Non- / non  female who presents with Fatigue   and she is admitted to the hospital for the management of fatigue  Patient is 71-year-old female with multiple comorbidities including history of end-stage renal disease on hemodialysis, history of COPD, history of diastolic heart failure, hypertension, PVD, A-fib not on any anticoagulation due to history of bleeding presented to the ER with fatigue. Patient complaining of fatigue and dizziness since last couple of days, no chest pain, no shortness of breath, patient's daughter was present at the bedside, stated that patient was just very weak and fatigued, no fever or chills, no abdominal symptoms     In the ER patient was found to have troponin elevation peaked at 127, no chest pain, EKG showed A-fib, rate controlled, patient was placed on 2 L of oxygen for comfort,  Also had worsening lower extremity edema and edema, as per patient's daughter patient does have multiple episodes of cellulitis in the past, was seen by Dr. Garcia Cumbola of Systems:     Review of Systems   Constitutional:  Negative for chills and fever.    Respiratory:

## 2023-07-25 NOTE — PROGRESS NOTES
Patient's daughter Nino Hartley called the unit. Updated Ilianayesenia Hartley that her mother is still confused, anxious and agitated. Ilianayesenia Hartley stated that she has noticed her mother becoming increasingly confused and agitated at home. Sea's questions answered.

## 2023-07-25 NOTE — PLAN OF CARE
Problem: Discharge Planning  Goal: Discharge to home or other facility with appropriate resources  7/24/2023 1902 by Treasure Smith RN  Outcome: Progressing     Problem: Safety - Adult  Goal: Free from fall injury  7/25/2023 0133 by Osie Pallas, RN  Note: Patient confused. Agitated  and restless. Bed alarm set. Telesitter initiated. 7/24/2023 1902 by Treasure Smith RN  Outcome: Progressing     Problem: ABCDS Injury Assessment  Goal: Absence of physical injury  7/24/2023 1902 by Treasure Smith RN  Outcome: Progressing     Problem: Skin/Tissue Integrity  Goal: Absence of new skin breakdown  Description: 1. Monitor for areas of redness and/or skin breakdown  2. Assess vascular access sites hourly  3. Every 4-6 hours minimum:  Change oxygen saturation probe site  4. Every 4-6 hours:  If on nasal continuous positive airway pressure, respiratory therapy assess nares and determine need for appliance change or resting period. 7/25/2023 0133 by Osie Pallas, RN  Note: Skin on lower legs reddened.  Assited with turning an repositioning.  7/24/2023 1902 by Treasure Smith RN  Outcome: Progressing

## 2023-07-25 NOTE — PROGRESS NOTES
Physical Therapy        Physical Therapy Cancel Note      DATE: 2023    NAME: Parul Mansfield  MRN: [id number]   : 1950      Patient not seen this date for Physical Therapy due to:    Hemodialysis: Pt being taken to dialysis.       Electronically signed by Aziza Munoz PT on 2023 at 3:15 PM

## 2023-07-25 NOTE — PROGRESS NOTES
Writer noticed potassium level drawn from 3pm was 3.0  No potassium replacement ordered today. Writer spoke with Dr. Nicola Iraheta and order received.

## 2023-07-25 NOTE — CONSULTS
NEPHROLOGY CONSULT     Patient :  Dillan Plasencia; 67 y.o. MRN# [id number]  Location:  2093/2093-01  Attending:  Giorgio Lizama MD  Admit Date:  7/22/2023   Hospital Day: 3      Reason for Consult: ESRD/dialysis      Chief Complaint: Confusion, fatigue difficulty speaking  History Obtained From:  patient    Interval history:  Patient complains of fatigue but denies any chest pain. She had echocardiogram today with EF 55%. She had dialysis yesterday with 2 litres removed. History of Present Illness: This is a 67 y.o. female  with a significant past medical history of Cerebrovascular accident, combined diastolic and systolic heart failure, essential hypertension, partial deafness and chronic kidney disease stage V ESRD on hemodialysis Tuesday Thursday Saturday at [address] [facility] dialysis unit under my care. Patient presented to the hospital after she was found to have lethargy confusion difficulty speaking, patient has been feeling weak fatigue poor oral intake and appetite. Patient did not go to dialysis on Saturday because of weakness and confusion.   Patient is more awake alert oriented now  Not in acute distress  Patient complains of bilateral leg pain and erythema suspicious for cellulitis patient started on antibiotics  Blood pressure stable no fever        Past Medical History:        Diagnosis Date    Acute CVA (cerebrovascular accident) ([address]) 07/25/2020    Acute kidney injury superimposed on CKD ([address]) 6/8/2022    Acute on chronic combined systolic (congestive) and diastolic (congestive) heart failure ([address]) 02/06/2022    Acute respiratory failure with hypoxia and hypercapnia (HCC)     JOY (acute kidney injury) ([address]) 1/15/2022    Arthritis     Asthma     Bilateral lower extremity edema 4/20/2022    Bradycardia 06/12/2022    Chronic diastolic congestive heart failure ([address]) 2/20/2020    Chronic ulcer of left leg with fat layer exposed ([address]) 6/21/2022    CKD stage 4 secondary to hypertension ([address]) 2/20/2020 Tuesday Thursday Saturday patient missed dialysis on Saturday  Essential hypertension  Anemia of chronic kidney disease  Acute encephalopathy was ruled out   Type 2 diabetes  History of multivessel disease CAD S/P CABG-Elevated troponin-negative lexiscan cardiac stress test       Plan:  Hemodialysis Tuesday Thursday Saturday-dialysis today  Renal diet 2 gm Na, 2 gm potassium, 1 gm po4 and 1.2 gm/kg/bw protein per day and 1500 mls fluid restriction . Basic metabolic panel and CBC daily  Check urinalysis       Thank you for the consultation.       Electronically signed by Cat Evans MD on 7/25/2023 at 12:08 PM

## 2023-07-25 NOTE — PROGRESS NOTES
[address] [facility]   OCCUPATIONAL THERAPY MISSED TREATMENT NOTE   INPATIENT   Date: 23  Patient Name: Tess Kent       Room: 4592/4441-37  MRN: [id number]   Account #: [de-identified]    : 1950  (77 y.o.)  Gender: female                 REASON FOR MISSED TREATMENT:  Pt being transported for dialysis and OT eval was unable to be completed. OT will follow and reassess.     -    1416          Electronically signed by Precious CHUN on 23 at 2:57 PM EDT

## 2023-07-25 NOTE — PROGRESS NOTES
Physician Progress Note      Joe Cosme  CSN #:                  [id number]  :                       1950  ADMIT DATE:       2023 11:22 AM  [address] [facility] DATE:  RESPONDING  PROVIDER #:        Reyna Curtis MD          QUERY TEXT:    Patient admitted with Fatigue. Noted documentation of metabolic encephalopathy   in renal consult note from . In order to support the diagnosis of   metabolic encephalopathy, please include additional clinical indicators in   your documentation. Or please document if the diagnosis of encephalopathy has   been ruled out after further study. The medical record reflects the following:  Risk Factors: Hx ESRD, missed HD session  Clinical Indicators: [place] coma scale stable at 15 throughout admission; per   nursing assessment--> Neuro: Alert and Oriented x 4, throughout admission so   far; per ED notes--> negative for confusion, Mental Status: She is alert and   oriented to person, place, and time; Creat 5.2-->3.2  Treatment: Renal consult, HD session planned for , monitoring, hospital   admission  Options provided:  -- Metabolic encephalopathy was ruled out  -- Metabolic encephalopathy present as evidenced by, Please document evidence. -- Other - I will add my own diagnosis  -- Disagree - Not applicable / Not valid  -- Disagree - Clinically unable to determine / Unknown  -- Refer to Clinical Documentation Reviewer    PROVIDER RESPONSE TEXT:    Metabolic encephalopathy was ruled out after study.     Query created by: Araseli Womack on 2023 5:25 PM      Electronically signed by:  Reyna Curtis MD 2023 10:39 AM

## 2023-07-25 NOTE — PROGRESS NOTES
Perfect [employer] sent to Dr. Kaleigh Vaughn about patient's increased confusion and combativeness. Daughter Shaka Burton also notified of increased agitation. Daughter states that increased confusion started happening before admission when patient was still at home.

## 2023-07-25 NOTE — PROGRESS NOTES
HEMODIALYSIS POST TREATMENT NOTE    Treatment time ordered: 3.25hr    Actual treatment time: 3.25hr    UltraFiltration Goal: 1800  UltraFiltration Removed: 1800      Pre Treatment weight: 57.1kg  Post Treatment weight: 55.6kg  Estimated Dry Weight: TBD    Access used:     Central Venous Catheter:          Tunneled or Non-tunneled: Tunneled           Site: Right Chest          Access Flow: good      Internal Access:       AV Fistula or AV Graft: NA         Site: NA       Access Flow: NA       Sign and symptoms of infection: No       If YES: NA    Medications or blood products given: Zofran, for Nausea    [facility]    Chronic outpatient unit: [facility]    Summary of response to treatment: completed 3.25 hr HD TX and removed 1.5 Liters of fluid. pt tolerated HD TX poor. pt having nausea and tachycardia (HR low 100's), pt given Zofran during TX. instilled sodium citrate (arterial dwell 1.6ml, venous dwell 1.6ml), CVC dressing changed and is clean, dry and intact. report given to primary nurse, Jyoti Dye RN. record completed and printed to be placed on pt's chart. Explain if orders NOT met, was physician notified:RITU MENDIOLA flowsheet faxed to patient unit/ placed in patient chart: Yes    Post assessment completed: Yes    Report given to: Cecily Molina documented Safety Checks include the followin) Access and face visible at all times. 2) All connections and blood lines are secure with no kinks. 3) NVL alarm engaged. 4) Hemosafe device applied (if applicable). 5) No collapse of Arterial or Venous blood chambers. 6) All blood lines / pump segments in the air detectors.

## 2023-07-25 NOTE — PROGRESS NOTES
Pharmacy Note  Vancomycin Consult - Daily note   Vancomycin Therapy Day: 4  Current Dosing: per HD protocol  Current diagnosis for which MRSA is suspected/confirmed: SSTI  ONLY for suspected pneumonia or COPD: MRSA nasal swab   N/A: Non respiratory infection. .        Last Temp: 98.3  Actual Weight:   Wt Readings from Last 1 Encounters:   07/25/23 125 lb 14.1 oz (57.1 kg)     Recent Labs     07/24/23  1530 07/25/23  0524   CREATININE 3.2* 3.8*     CrCl:  Patient is on dialysis. Recent Labs     07/22/23  1240   WBC 7.3       Intake/Output Summary (Last 24 hours) at 7/25/2023 5085  Last data filed at 7/25/2023 0529  Gross per 24 hour   Intake 930 ml   Output 2000 ml   Net -1070 ml       Recent vancomycin administrations                     vancomycin (VANCOCIN) 750 mg in sodium chloride 0.9 % 250 mL IVPB (Hywv2Ack) (mg) 750 mg New Bag 07/24/23 1802    vancomycin (VANCOCIN) 1,500 mg in sodium chloride 0.9 % 250 mL IVPB (Ywbm5Pps) (mg) 1,500 mg New Bag 07/22/23 2100                    Vancomycin Concentrations:   TROUGH:  No results for input(s): VANCOTROUGH in the last 72 hours. RANDOM:    Recent Labs     07/24/23  1530 07/25/23  0524   VANCORANDOM 16.4  16.4 29.7       ASSESSMENT/PLAN    Pre-HD level is greater than 25 mg/L. Will hold further dose for now and repeat pre-HD vanco level on Thursday. Pharmacy will Continue to follow. Thank you. Clarissa Villalta, [facility], [facility]/PharmD  7/25/2023  6:32 AM    Intermittent Hemodialysis: Not using bayesian software for dosing  Maintenance Dosing = 10-20 mg/kg after HD sessions (max 2,000 mg per dose). Timing of concentration monitoring:  Critically ill - pre-HD level after the loading dose       Stable/stable dialysis:  pre-HD concentration after 1st or 2nd MD on dialysis 3 times/week  Pre-dialysis level Invasive MRSA Infection or Sepsis Non-Invasive Infection or Non-MRSA Infection   ? 25 mg/L Hold vancomycin dose, ? subsequent dose by 250 mg Hold vancomycin dose, ? subsequent dose by 250-500 mg   21-24 mg/L Continue current dose Decrease dose by 250 mg   15-20 mg/L Increase dose by 250 mg Continue current dose   ? 14 mg/L Increase dose by 250-500 mg Increase dose by 250 mg   Note: Pre-HD concentration monitoring is preferred, if concentrations are drawn after HD, the level must be collected no sooner than 2-4 hours after the end of the session to allow for maximum redistribution and plasma rebound.

## 2023-07-25 NOTE — CARE COORDINATION
ONGOING DISCHARGE PLAN:    Patient is currently out of the room, in Dialysis    Per Notes, pt. Is from Home w/ Son. PT/OT on board, will follow for any rec/needs. Stress test yesterday, Cardio. Telesitter at the bedside. Will continue to follow for additional discharge needs. If patient is discharged prior to next notation, then this note serves as note for discharge by case management.     Electronically signed by Keena Ontiveros RN on 7/25/2023 at 4:31 PM

## 2023-07-25 NOTE — PROGRESS NOTES
Patient confused. Very restless and agitated. Oriented to person only. Patient asked to talk to daughter Prince Egan. Prince Egan called for patient and patient spoke with daughter.

## 2023-07-26 ENCOUNTER — TELEPHONE (OUTPATIENT)
Dept: FAMILY MEDICINE CLINIC | Age: 73
End: 2023-07-26

## 2023-07-26 VITALS
HEART RATE: 100 BPM | WEIGHT: 122.58 LBS | OXYGEN SATURATION: 95 % | RESPIRATION RATE: 18 BRPM | DIASTOLIC BLOOD PRESSURE: 61 MMHG | HEIGHT: 59 IN | SYSTOLIC BLOOD PRESSURE: 113 MMHG | BODY MASS INDEX: 24.71 KG/M2 | TEMPERATURE: 98 F

## 2023-07-26 DIAGNOSIS — I25.10 CORONARY ARTERY DISEASE INVOLVING NATIVE CORONARY ARTERY OF NATIVE HEART WITHOUT ANGINA PECTORIS: ICD-10-CM

## 2023-07-26 DIAGNOSIS — F41.9 MODERATE ANXIETY: ICD-10-CM

## 2023-07-26 DIAGNOSIS — J30.89 NON-SEASONAL ALLERGIC RHINITIS DUE TO OTHER ALLERGIC TRIGGER: Primary | ICD-10-CM

## 2023-07-26 DIAGNOSIS — I12.0 HYPERTENSIVE CKD, ESRD ON DIALYSIS (HCC): ICD-10-CM

## 2023-07-26 DIAGNOSIS — I48.0 PAROXYSMAL ATRIAL FIBRILLATION (HCC): ICD-10-CM

## 2023-07-26 DIAGNOSIS — Z99.2 HYPERTENSIVE CKD, ESRD ON DIALYSIS (HCC): ICD-10-CM

## 2023-07-26 DIAGNOSIS — N18.6 HYPERTENSIVE CKD, ESRD ON DIALYSIS (HCC): ICD-10-CM

## 2023-07-26 PROBLEM — F01.B4 MODERATE VASCULAR DEMENTIA WITH ANXIETY (HCC): Status: ACTIVE | Noted: 2023-07-26

## 2023-07-26 LAB
EKG ATRIAL RATE: 75 BPM
EKG Q-T INTERVAL: 376 MS
EKG QRS DURATION: 104 MS
EKG QTC CALCULATION (BAZETT): 428 MS
EKG R AXIS: -5 DEGREES
EKG T AXIS: 158 DEGREES
EKG VENTRICULAR RATE: 78 BPM
MICROORGANISM SPEC CULT: ABNORMAL
SPECIMEN DESCRIPTION: ABNORMAL

## 2023-07-26 PROCEDURE — 6370000000 HC RX 637 (ALT 250 FOR IP): Performed by: INTERNAL MEDICINE

## 2023-07-26 PROCEDURE — 97162 PT EVAL MOD COMPLEX 30 MIN: CPT

## 2023-07-26 PROCEDURE — 99239 HOSP IP/OBS DSCHRG MGMT >30: CPT | Performed by: INTERNAL MEDICINE

## 2023-07-26 PROCEDURE — 2580000003 HC RX 258: Performed by: INTERNAL MEDICINE

## 2023-07-26 PROCEDURE — 97166 OT EVAL MOD COMPLEX 45 MIN: CPT

## 2023-07-26 RX ORDER — LIDOCAINE 40 MG/G
CREAM TOPICAL
Qty: 120 G | Refills: 3
Start: 2023-07-26

## 2023-07-26 RX ORDER — FLUTICASONE PROPIONATE 50 MCG
2 SPRAY, SUSPENSION (ML) NASAL DAILY
Qty: 48 G | Refills: 1 | Status: SHIPPED | OUTPATIENT
Start: 2023-07-26

## 2023-07-26 RX ORDER — ASPIRIN 81 MG/1
81 TABLET ORAL DAILY
Qty: 30 TABLET | Refills: 3 | Status: SHIPPED | OUTPATIENT
Start: 2023-07-27

## 2023-07-26 RX ORDER — HYDROXYZINE 50 MG/1
50 TABLET, FILM COATED ORAL PRN
Qty: 90 TABLET | Refills: 3 | Status: SHIPPED | OUTPATIENT
Start: 2023-07-26

## 2023-07-26 RX ORDER — DOXYCYCLINE HYCLATE 100 MG
100 TABLET ORAL 2 TIMES DAILY
Qty: 14 TABLET | Refills: 0 | Status: SHIPPED | OUTPATIENT
Start: 2023-07-26 | End: 2023-08-02

## 2023-07-26 RX ORDER — METOPROLOL SUCCINATE 25 MG/1
25 TABLET, EXTENDED RELEASE ORAL EVERY EVENING
Qty: 90 TABLET | Refills: 3 | Status: SHIPPED | OUTPATIENT
Start: 2023-07-26

## 2023-07-26 RX ORDER — LANOLIN ALCOHOL/MO/W.PET/CERES
3 CREAM (GRAM) TOPICAL NIGHTLY PRN
Qty: 30 TABLET | Refills: 0 | Status: SHIPPED | OUTPATIENT
Start: 2023-07-26

## 2023-07-26 RX ADMIN — AMIODARONE HYDROCHLORIDE 100 MG: 200 TABLET ORAL at 11:04

## 2023-07-26 RX ADMIN — ACETAMINOPHEN 650 MG: 325 TABLET ORAL at 11:07

## 2023-07-26 RX ADMIN — PANTOPRAZOLE SODIUM 40 MG: 40 TABLET, DELAYED RELEASE ORAL at 11:04

## 2023-07-26 RX ADMIN — ASPIRIN 81 MG: 81 TABLET, COATED ORAL at 11:04

## 2023-07-26 RX ADMIN — LEVOTHYROXINE SODIUM 25 MCG: 0.03 TABLET ORAL at 05:54

## 2023-07-26 RX ADMIN — SODIUM CHLORIDE, PRESERVATIVE FREE 10 ML: 5 INJECTION INTRAVENOUS at 11:04

## 2023-07-26 ASSESSMENT — ENCOUNTER SYMPTOMS
NAUSEA: 0
SHORTNESS OF BREATH: 0
COUGH: 0
DIARRHEA: 0
ABDOMINAL PAIN: 0
VOMITING: 0
CHEST TIGHTNESS: 0
CONSTIPATION: 0

## 2023-07-26 NOTE — PROGRESS NOTES
Patient discharged to home per orders. IV discontinued intact. Discharge instructions reviewed with patient and daughter, Jaelyn Chaudhry. Patient states all belongings are present. Taken per wheelchair to awaiting vehicle.

## 2023-07-26 NOTE — PROGRESS NOTES
[facility]   Occupational Therapy Evaluation  Date: 23  Patient Name: Darius Sims       Room: 9441/7419-38  MRN: [id number]  Account: [de-identified]   : 1950  (77 y.o.) Gender: female     Discharge Recommendations:  Further Occupational Therapy is recommended upon facility discharge. OT Equipment Recommendations  Equipment Needed: No    Referring Practitioner: Geovany Jeffery MD  Diagnosis: NSTEMI (non-ST elevated myocardial infarction) Additional Pertinent Hx: 50-year-old female presents for complaints of fatigue. Patient reports since after dialysis on Tuesday she has been having increased fatigue. She states that she just has \"no energy\" patient denies any chest pain, she reports minimal shortness of breath with some mild nausea denies any abdominal pain. She reports that she makes a very small amount of urine. She states that she did not go to dialysis today.     Treatment Diagnosis: impaired self care status    Past Medical History:  has a past medical history of Acute CVA (cerebrovascular accident) ([address]), Acute kidney injury superimposed on CKD ([address]), Acute on chronic combined systolic (congestive) and diastolic (congestive) heart failure ([address]), Acute respiratory failure with hypoxia and hypercapnia ([address]), JOY (acute kidney injury) ([address]), Arthritis, Asthma, Bilateral lower extremity edema, Bradycardia, Chronic diastolic congestive heart failure ([address]), Chronic ulcer of left leg with fat layer exposed ([address]), CKD stage 4 secondary to hypertension ([address]), Dependence on renal dialysis ([address]), ESRD (end stage renal disease) ([address]), Essential hypertension, Gastrointestinal hemorrhage, Hallucinations, Hard of hearing, Head contusion, Hyperkalemia, Iron deficiency anemia, unspecified, Leg ulcer, left, with fat layer exposed ([address]), Lymphedema, Meningioma ([address]), Meningioma, cerebral ([address]), Mild intermittent asthma, uncomplicated, Myocardial infarction ([address]),

## 2023-07-26 NOTE — FLOWSHEET NOTE
07/21/22 1955   Vital Signs   BP (!) 138/59   Heart Rate 90   Resp 23   SpO2 100 %   Weight 137 lb 9.1 oz (62.4 kg)   Weight Method Estimated   Percent Weight Change -4.59   Post-Hemodialysis Assessment   Post-Treatment Procedures Blood returned;Catheter capped, clamped with Citrate x 2 ports   Machine Disinfection Process Acid/Vinegar Clean;Heat Disinfect; Exterior Machine Disinfection   Rinseback Volume (ml) 500 ml   Blood Volume Processed (Liters) 78.2 l/min   Dialyzer Clearance Clear   Duration of Treatment (minutes) 210 minutes   Hemodialysis Intake (ml) 500 ml   Hemodialysis Output (ml) 3500 ml   NET Removed (ml) 3000   Tolerated Treatment Fair   Patient Response to Treatment Patient tolerated treatment well without interruptions. 3000mL removed. Drsg changed.    Time Off 1939   Patient Disposition Remain in ICU/ED Number Of Freeze-Thaw Cycles: 2 freeze-thaw cycles Render Post-Care Instructions In Note?: no Detail Level: Detailed Duration Of Freeze Thaw-Cycle (Seconds): 5 Show Applicator Variable?: Yes Consent: The patient's consent was obtained including but not limited to risks of crusting, scabbing, blistering, scarring, darker or lighter pigmentary change, recurrence, incomplete removal and infection. Post-Care Instructions: I reviewed with the patient in detail post-care instructions. Patient is to wear sunprotection, and avoid picking at any of the treated lesions. Pt may apply Vaseline to crusted or scabbing areas. Duration Of Freeze Thaw-Cycle (Seconds): 5-10 Spray Paint Text: The liquid nitrogen was applied to the skin utilizing a spray paint frosting technique. Medical Necessity Information: It is in your best interest to select a reason for this procedure from the list below. All of these items fulfill various CMS LCD requirements except the new and changing color options. Medical Necessity Clause: This procedure was medically necessary because the lesions that were treated were:

## 2023-07-26 NOTE — PROGRESS NOTES
07/26/23 1432   Encounter Summary   Encounter Overview/Reason  Spiritual/Emotional Needs   Service Provided For: Patient   Referral/Consult From: Rounding   Complexity of Encounter Low   Spiritual/Emotional needs   Type Spiritual Support   Assessment/Intervention/Outcome   Assessment Unable to assess   Intervention Prayer (assurance of)/[place]  (Pt with staff)

## 2023-07-26 NOTE — DISCHARGE SUMMARY
[facility]  Azar Godinez MD; Reid Pak MD; Cem Pineda MD; MD Sam Oliver MD; Michaela Rivera MD      [facility]  [address]    Discharge Summary     Patient ID: Rebecca Rodríguez  :     MRN: [id number]     ACCOUNT:  [de-identified]   Patient's PCP: Latisha Bobby MD  Admit Date: 2023   Discharge Date: 2023     Length of Stay: 4  Code Status:  Full Code  Admitting Physician: Calin Vogel MD  Discharge Physician: Elizabeth Dumont MD     Active Discharge Diagnoses:     Hospital Problem Lists:  Principal Problem:    NSTEMI (non-ST elevated myocardial infarction) [facility])  Active Problems:    Type 2 diabetes mellitus with polyneuropathy ([address])    Stage 5 chronic kidney disease on chronic dialysis ([address])    Hypertensive heart and chronic kidney disease with heart failure and with stage 5 chronic kidney disease, or end stage renal disease ([address])    Acquired hypothyroidism    Coronary artery disease involving native coronary artery of native heart without angina pectoris    Bilateral hearing loss    Moderate vascular dementia with anxiety ([address])  Resolved Problems:    * No resolved hospital problems.  *      Admission Condition:  poor     Discharged Condition: fair    Hospital Stay:     Hospital Course:  Rebecca Rodríguez is a 67 y.o. female who was admitted for the management of   NSTEMI (non-ST elevated myocardial infarction) ([address]) , presented to ER with Fatigue        Review of system:  Denies any nausea vomiting fever chills,  Denies any headaches or blurred vision,  Denies any chest pain   Denies any cough phlegm hemoptysis,  Denies any abdominal pain diarrhea constipation,  Denies any tingling tingling numbness weakness of arms or legs,   Skin no rash,    On examination,  Alert awake oriented x3,  S1-S2 present,  CTA bilateral,  Abdomen soft nontender nondistended bowel sounds

## 2023-07-26 NOTE — PROGRESS NOTES
Pharmacy Note  Vancomycin Consult - Daily note   Vancomycin Therapy Day: 5  Current Dosing: pulse dosing for dialysis patient  Current diagnosis for which MRSA is suspected/confirmed: SSTI  ONLY for suspected pneumonia or COPD: MRSA nasal swab   N/A: Non respiratory infection. .        Last Temp: 97.9  Actual Weight:   Wt Readings from Last 1 Encounters:   07/25/23 122 lb 9.2 oz (55.6 kg)     Recent Labs     07/24/23  1530 07/25/23  0524   CREATININE 3.2* 3.8*     CrCl:  Patient is on dialysis. No results for input(s): WBC in the last 72 hours. Intake/Output Summary (Last 24 hours) at 7/26/2023 0703  Last data filed at 7/26/2023 0417  Gross per 24 hour   Intake 180 ml   Output 1700 ml   Net -1520 ml       Recent vancomycin administrations                     vancomycin (VANCOCIN) 750 mg in sodium chloride 0.9 % 250 mL IVPB ([id number]) (mg) 750 mg New Bag 07/24/23 1802                    Vancomycin Concentrations:   TROUGH:  No results for input(s): VANCOTROUGH in the last 72 hours. RANDOM:    Recent Labs     07/24/23  1530 07/25/23  0524   VANCORANDOM 16.4  16.4 29.7       ASSESSMENT/PLAN    HD TTS schedule  No dose today  Random level ordered with AM labs on 07/27 with another dose to be given after HD if appropriate    Pharmacy will Continue to follow. Thank you. Ej, [address] [place] [address], [facility]/PharmD  7/26/2023  7:03 AM    Intermittent Hemodialysis: Not using bayesian software for dosing  Maintenance Dosing = 10-20 mg/kg after HD sessions (max 2,000 mg per dose).   Timing of concentration monitoring:  Critically ill - pre-HD level after the loading dose       Stable/stable dialysis:  pre-HD concentration after 1st or 2nd MD on dialysis 3 times/week  Pre-dialysis level Invasive MRSA Infection or Sepsis Non-Invasive Infection or Non-MRSA Infection   ? 25 mg/L Hold vancomycin dose, ? subsequent dose by 250 mg Hold vancomycin dose, ? subsequent dose by 250-500 mg   21-24 mg/L Continue current dose Decrease dose by 250 mg

## 2023-07-26 NOTE — CONSULTS
NEPHROLOGY CONSULT     Patient :  Debbie Rush; 67 y.o. MRN# [id number]  Location:  2093/2093-01  Attending:  Dona Gardiner MD  Admit Date:  7/22/2023   Hospital Day: 4      Reason for Consult: ESRD/dialysis      Chief Complaint: Confusion, fatigue difficulty speaking  History Obtained From:  patient    Interval history:  Patient denies any fatigue or  any chest pain. She had echocardiogram today with EF 55%. She had dialysis yesterday with 1.8 kg removed. History of Present Illness: This is a 67 y.o. female  with a significant past medical history of Cerebrovascular accident, combined diastolic and systolic heart failure, essential hypertension, partial deafness and chronic kidney disease stage V ESRD on hemodialysis Tuesday Thursday Saturday at [address] [facility] dialysis unit under my care. Patient presented to the hospital after she was found to have lethargy confusion difficulty speaking, patient has been feeling weak fatigue poor oral intake and appetite. Patient did not go to dialysis on Saturday because of weakness and confusion.   Patient is more awake alert oriented now  Not in acute distress  Patient complains of bilateral leg pain and erythema suspicious for cellulitis patient started on antibiotics  Blood pressure stable no fever        Past Medical History:        Diagnosis Date    Acute CVA (cerebrovascular accident) ([address]) 07/25/2020    Acute kidney injury superimposed on CKD ([address]) 6/8/2022    Acute on chronic combined systolic (congestive) and diastolic (congestive) heart failure ([address]) 02/06/2022    Acute respiratory failure with hypoxia and hypercapnia (HCC)     JOY (acute kidney injury) ([address]) 1/15/2022    Arthritis     Asthma     Bilateral lower extremity edema 4/20/2022    Bradycardia 06/12/2022    Chronic diastolic congestive heart failure ([address]) 2/20/2020    Chronic ulcer of left leg with fat layer exposed ([address]) 6/21/2022    CKD stage 4 secondary to hypertension ([address]) 2/20/2020    Dependence on

## 2023-07-26 NOTE — PROGRESS NOTES
Pertinent Hx: History of Present Illness: This is a 67 y.o. female  with a significant past medical history of Cerebrovascular accident, combined diastolic and systolic heart failure, essential hypertension, partial deafness and chronic kidney disease stage V ESRD on hemodialysis Tuesday Thursday Saturday at [address] [facility] dialysis unit under my care. Patient presented to the hospital after she was found to have lethargy confusion difficulty speaking, patient has been feeling weak fatigue poor oral intake and appetite. Patient did not go to dialysis on Saturday because of weakness and confusion. Patient is more awake alert oriented now  Not in acute distress  Patient complains of bilateral leg pain and erythema suspicious for cellulitis patient started on antibiotics  Blood pressure stable no fever  Family / Caregiver Present:  (Daughter present.)  Referral Date : 07/22/23  Diagnosis: NSTEMI  Follows Commands: Impaired  Subjective  Subjective: \"George Lutz discharged me, I am going home. \" Per Rn pt is being DC, daughter here to pick pt up. PEr RN, staff had difficulty transferring pt from [facility] to bed yesterday, and used jayden garcia. RN wants PT/OT to assess transfrs prior to DC.          Social/Functional History  Social/Functional History  Lives With: Son  Type of Home: House  Home Layout: Able to Live on Main level with bedroom/bathroom  Home Access: Ramped entrance  Bathroom Shower/Tub:  (Sponge bath)  Bathroom Toilet: Bedside commode  Home Equipment: Wheelchair-manual, Lift chair  Has the patient had two or more falls in the past year or any fall with injury in the past year?: No  Receives Help From: Family  ADL Assistance: Needs assistance (Assist with LBD)  Toileting: Independent  Homemaking Assistance: Needs assistance (Family prmary for all home making taks.)  Homemaking Responsibilities: No  Ambulation Assistance: Non-ambulatory  Transfer Assistance: Needs assistance (Occasionally needs help)  Active : No  Patient's  Info: son or daughter  Occupation: Retired  Additional Comments: Pt has 24/7 assist. Son provides assist for transfers, daughter takes pt back and forth to dialysis. Pt is a poor historian, daughter present to provide answers to social/functional questions. Vision/Hearing  Vision  Vision: Impaired (Blind R eye, sees only shadows Left eye)  Hearing  Hearing: Exceptions to [facility]  Hearing Exceptions: Bilateral hearing aid    Cognition   Cognition  Overall Cognitive Status: Exceptions  Arousal/Alertness: Delayed responses to stimuli  Following Commands: Inconsistently follows commands  Attention Span: Difficulty dividing attention; Difficulty attending to directions  Memory: Decreased recall of recent events;Decreased short term memory;Decreased recall of precautions  Safety Judgement: Decreased awareness of need for safety;Decreased awareness of need for assistance  Problem Solving: Decreased awareness of errors  Insights: Decreased awareness of deficits  Initiation: Requires cues for some  Sequencing: Requires cues for some     Objective   O2 Device: None (Room air)     Observation/Palpation  Observation: B LE redness noted. AROM RLE (degrees)  RLE AROM: WFL  RLE General AROM: Tight Hamstrings and Achilles tendon noted  AROM LLE (degrees)  LLE AROM : WFL  LLE General AROM: Tight Hamstrings and Achilles tendon noted  Strength RLE  Comment: Difficulty performing MMT, 2* cognitive deficts and pt gets anxious. Per observation, pt able to move her legs antigravity. Strength LLE  Comment: Difficulty performing MMT, 2* cognitive deficts and pt gets anxious. Per observation, pt able to move her legs antigravity.            Bed mobility  Rolling to Left: Maximum assistance (Max cues given 2* visual deficts, for e.g needs guidance to reach for bed rail.)  Supine to Sit: Maximum assistance  Sit to Supine: Maximum assistance  Bed Mobility Comments: head of bed elevated, pt sleeps in lift chair at

## 2023-07-26 NOTE — PLAN OF CARE
Problem: Discharge Planning  Goal: Discharge to home or other facility with appropriate resources  Outcome: Progressing     Problem: Safety - Adult  Goal: Free from fall injury  7/26/2023 0132 by Henny White RN  Outcome: Progressing  Note: Patient confused. Weak. Telesitter remains at bedside. Bed alarm on.  7/25/2023 1752 by Kalina Larsen RN  Outcome: Progressing  Note: Patient remains free from injury. Problem: ABCDS Injury Assessment  Goal: Absence of physical injury  Outcome: Progressing     Problem: Skin/Tissue Integrity  Goal: Absence of new skin breakdown  Description: 1. Monitor for areas of redness and/or skin breakdown  2. Assess vascular access sites hourly  3. Every 4-6 hours minimum:  Change oxygen saturation probe site  4. Every 4-6 hours:  If on nasal continuous positive airway pressure, respiratory therapy assess nares and determine need for appliance change or resting period. Outcome: Progressing  Note: Assisted with repositioning. Problem: Respiratory - Adult  Goal: Achieves optimal ventilation and oxygenation  7/26/2023 0132 by Henny White RN  Outcome: Progressing  Note: Lungs clear but decreased. Oxygen sat in mid 90s on room air.  7/25/2023 1752 by Kalina Larsen RN  Outcome: Progressing  Note: Patient breathing easily, no signs of distress. Vitals stable. Problem: Cardiovascular - Adult  Goal: Maintains optimal cardiac output and hemodynamic stability  Outcome: Progressing  Note: Telemetry atrial fib with rate controlled.   Goal: Absence of cardiac dysrhythmias or at baseline  Outcome: Progressing     Problem: Chronic Conditions and Co-morbidities  Goal: Patient's chronic conditions and co-morbidity symptoms are monitored and maintained or improved  Outcome: Progressing     Problem: Pain  Goal: Verbalizes/displays adequate comfort level or baseline comfort level  Outcome: Progressing

## 2023-07-26 NOTE — PLAN OF CARE
Problem: Discharge Planning  Goal: Discharge to home or other facility with appropriate resources  7/26/2023 1311 by Galdino Larsen RN  Outcome: Completed  7/26/2023 0132 by Brie Wright RN  Outcome: Progressing     Problem: Safety - Adult  Goal: Free from fall injury  7/26/2023 1311 by Galdino Larsen RN  Outcome: Completed  7/26/2023 0132 by Brie Wright RN  Outcome: Progressing  Note: Patient confused. Weak. Telesitter remains at bedside. Bed alarm on. Problem: ABCDS Injury Assessment  Goal: Absence of physical injury  7/26/2023 1311 by Galdino Larsen RN  Outcome: Completed  7/26/2023 0132 by Brie Wright RN  Outcome: Progressing     Problem: Skin/Tissue Integrity  Goal: Absence of new skin breakdown  Description: 1. Monitor for areas of redness and/or skin breakdown  2. Assess vascular access sites hourly  3. Every 4-6 hours minimum:  Change oxygen saturation probe site  4. Every 4-6 hours:  If on nasal continuous positive airway pressure, respiratory therapy assess nares and determine need for appliance change or resting period. 7/26/2023 1311 by Galdino Larsen RN  Outcome: Completed  7/26/2023 0132 by Brie Wright RN  Outcome: Progressing  Note: Assisted with repositioning. Problem: Respiratory - Adult  Goal: Achieves optimal ventilation and oxygenation  7/26/2023 1311 by Galdino Larsen RN  Outcome: Completed  7/26/2023 0132 by Brie Wright RN  Outcome: Progressing  Note: Lungs clear but decreased. Oxygen sat in mid 90s on room air. Problem: Cardiovascular - Adult  Goal: Maintains optimal cardiac output and hemodynamic stability  7/26/2023 1311 by Galdino Larsen RN  Outcome: Completed  7/26/2023 0132 by Brie Wright RN  Outcome: Progressing  Note: Telemetry atrial fib with rate controlled.   Goal: Absence of cardiac dysrhythmias or at baseline  7/26/2023 1311 by Galdino Larsen RN  Outcome: Completed  7/26/2023 0132 by Brie Wright RN  Outcome: Progressing     Problem: Chronic Conditions and Co-morbidities  Goal: Patient's chronic conditions and co-morbidity symptoms are monitored and maintained or improved  7/26/2023 1311 by Sterling Daley RN  Outcome: Completed  7/26/2023 0132 by Alanna Roca RN  Outcome: Progressing     Problem: Pain  Goal: Verbalizes/displays adequate comfort level or baseline comfort level  7/26/2023 1311 by Sterling Daley RN  Outcome: Completed  7/26/2023 0132 by Alanna Roca RN  Outcome: Progressing

## 2023-07-26 NOTE — CARE COORDINATION
ONGOING DISCHARGE PLAN:    Patient's daughter at bedside, agreeable to take patient home. IMM letter provided to patient. Patient offered four hours to make informed decision regarding appeal process; patient agreeable to discharge. Will continue to follow for additional discharge needs. If patient is discharged prior to next notation, then this note serves as note for discharge by case management.     Electronically signed by Pato Ro RN on 7/26/2023 at 12:27 PM

## 2023-07-27 ENCOUNTER — TELEPHONE (OUTPATIENT)
Dept: FAMILY MEDICINE CLINIC | Age: 73
End: 2023-07-27

## 2023-07-27 DIAGNOSIS — N39.0 E. COLI UTI: Primary | ICD-10-CM

## 2023-07-27 DIAGNOSIS — B96.20 E. COLI UTI: Primary | ICD-10-CM

## 2023-07-27 RX ORDER — CEFUROXIME AXETIL 500 MG/1
500 TABLET ORAL 2 TIMES DAILY
Qty: 10 TABLET | Refills: 0 | Status: SHIPPED | OUTPATIENT
Start: 2023-07-27 | End: 2023-08-01

## 2023-07-27 NOTE — RESULT ENCOUNTER NOTE
Noted, I informed Nuha Jenkins that I sent Keflex to the pharmacy, if any reaction develops like rashes to stop taking it right away, to inform dialysis,  Future Appointments  8/4/2023   12:45 PM   [facility]  8/9/2023   2:00 PM    MAL Melendez - [facility]    fp sc               [facility]  8/23/2023  3:15 PM    DO TREE Cervantes [facility]        [facility]  9/11/2023  2:45 PM    Margaux Mccoy PA-C           derm             [facility]  12/1/2023  3:30 PM    Cathleen Dia MD     fp sc               [facility]

## 2023-07-27 NOTE — TELEPHONE ENCOUNTER
Please schedule hospital follow-up    Future Appointments   Date Time Provider [address]   2023  3:15 PM Jann Reyezo, DO [facility]   2023  2:45 PM [facility] Aureliano, ALEKSANDRA Arevalo   2023  3:30 PM Rafael Rivera MD fp sc [id number]       FYI  Daughter Na Menezes called after hours, confused about meds stopped during hospitalization  :Clarinex, hydroxyzine, lidocaine cream, metoprolol mupirocin     I reviewed multiple progress notes starting with 2023 from cardiologist who says continue metoprolol XL 25 Mg daily, and Dr. Gael Jorge and the same continue metoprolol XL 25 Mg daily, patient was on metoprolol as needed injectable 5 mg  Patient was admitted with confusion    Discussed restart metoprolol with parameters, avoid Clarinex, okay hydroxyzine prior to dialysis only for severe anxiety related to needles and dialysis which patient has, will start Flonase nasal spray instead of Clarinex, I put back lidocaine and mupirocin    Na Menezes says she will call tomorrow to schedule a follow-up appointment, she was very thankful    Orders Placed This Encounter   Medications    metoprolol succinate (TOPROL XL) 25 MG extended release tablet     Sig: Take 1 tablet by mouth every evening Hold if BP bellow 115/65. Stop metoprolol tartrate     Dispense:  90 tablet     Refill:  3    mupirocin (BACTROBAN) 2 % ointment     Sig: Apply topically 3 times daily x 10 days.      Dispense:  90 g     Refill:  3    hydrOXYzine HCl (ATARAX) 50 MG tablet     Sig: Take 1 tablet by mouth as needed for Anxiety 1 tablet before dialysis     Dispense:  90 tablet     Refill:  3    fluticasone (FLONASE) 50 MCG/ACT nasal spray     Si sprays by Each Nostril route daily Replacing clarinex     Dispense:  48 g     Refill:  1    lidocaine (LMX) 4 % cream     Si g topical 2-3 times a day as needed for pain, maximum 20 g/day     Dispense:  120 g     Refill:  3       FYI copy from note  \"2023  Continue lipitor and

## 2023-07-27 NOTE — RESULT ENCOUNTER NOTE
Please notify patient: daughter Kevin Cho, patient discharged from the hospital yesterday, she has UTI E. coli, she was not treated for UTI in the hospital only vancomycin was received currently she is on doxycycline for cellulitis  Need to give her an antibiotic for UTI, which is sensitive to Keflex, her mom reported a reaction to Keflex but I am not sure if it was real allergic reaction does she remember what reaction she had? Per records, she has taken cefepime, cefuroxime and Keflex before which are all in the same class, so I doubt real allergic reaction  To discuss with her mom and to let us know, can I sent cefuroxime twice a day for 5 days for her UTI?       Patient also needs hospital follow-up please see my telephone encounter from last night        Future Appointments  8/23/2023  3:15 PM    DO TREE Cervantes [facility] [address]  9/11/2023  2:45 PM    Tala Salvador PA-C          [facility]  12/1/2023  3:30 PM    Bridgett Rosa MD     [facility]

## 2023-07-27 NOTE — TELEPHONE ENCOUNTER
[address] [facility],[address] Floor Transitions Initial Follow Up Call    Outreach made within 2 business days of discharge: Yes    Patient: Tess Kent Patient : 1950   MRN: [id number]  Reason for Admission: There are no discharge diagnoses documented for the most recent discharge. Discharge Date: 23       Spoke with: Byron Villalta    If Virtual visit made for hospital follow up, advise patient to make sure to have family member present to help assist with visit. Please make sure mobile number is updated in patient chart. Discharge department/facility: [address] [place] [address] Interactive Patient Contact:  Was patient able to fill all prescriptions: Yes  Was patient instructed to bring all medications to the follow-up visit: Yes  Is patient taking all medications as directed in the discharge summary?  Yes  Does patient understand their discharge instructions: Yes  Does patient have questions or concerns that need addressed prior to 7-14 day follow up office visit: no    Scheduled appointment with PCP within 7-14 days    Follow Up  Future Appointments   Date Time Provider [address] [place] [address]   2023 12:45 PM Karen Flores APRN - CNP STCZ WND CAR [facility]   2023  3:15 PM Jann Duong DO AFL T[facility]   2023  2:45 PM Cristobal Castillo PA-C [facility]   2023  3:30 PM Maya Luz MD [facility] Song Vogel MA

## 2023-07-27 NOTE — PROGRESS NOTES
I informed Jony Wade, that I sent the prescription to the pharmacy, advised to inform dialysis and if any rash develops to stop taking it. She will call the pharmacy today if not we will start tomorrow    Just letting you know, I did send the medication to the pharmacy    Orders Placed This Encounter   Medications    cefUROXime (CEFTIN) 500 MG tablet     Sig: Take 1 tablet by mouth 2 times daily for 5 days     Dispense:  10 tablet     Refill:  0         Please  from the pharmacy:    [address], [address] [facility] [address]  [address] [place] [address]  [phone] [facility] [address]  Phone: [phone] Fax: [phone]      If you have any questions, please let me know. Maya Luz MD      [facility] MA   7/27/2023  4:10 PM EDT Back to [facility] UP/STATED UNSURE AND DOESN'T REMEMBER HAVING A REACTION TO THE MEDIATION WILL TRY THIS    [facility]   7/27/2023 12:50 PM EDT       UNABLE TO LMOM FOR PATEINT TO RETURN CALL FOR LAB RESULTS WILL TRY AGAIN    Maya Luz MD   7/27/2023  7:46 AM EDT       Please notify patient: daughter Jony Wade, patient discharged from the hospital yesterday, she has UTI E. coli, she was not treated for UTI in the hospital only vancomycin was received currently she is on doxycycline for cellulitis  Need to give her an antibiotic for UTI, which is sensitive to Keflex, her mom reported a reaction to Keflex but I am not sure if it was real allergic reaction does she remember what reaction she had? Per records, she has taken cefepime, cefuroxime and Keflex before which are all in the same class, so I doubt real allergic reaction  To discuss with her mom and to let us know, can I sent cefuroxime twice a day for 5 days for her UTI?        Patient also needs hospital follow-up please see my telephone encounter from last night

## 2023-07-28 LAB
EKG ATRIAL RATE: 125 BPM
EKG Q-T INTERVAL: 372 MS
EKG QRS DURATION: 108 MS
EKG QTC CALCULATION (BAZETT): 462 MS
EKG R AXIS: -4 DEGREES
EKG T AXIS: -179 DEGREES
EKG VENTRICULAR RATE: 93 BPM

## 2023-07-28 PROCEDURE — 93010 ELECTROCARDIOGRAM REPORT: CPT | Performed by: INTERNAL MEDICINE

## 2023-07-28 NOTE — TELEPHONE ENCOUNTER
Noted  Future Appointments   Date Time Provider [address]   8/4/2023 12:45 PM MAL Randall - CNP STCZ WND CAR [facility]   8/9/2023  2:00 PM [facility]   8/23/2023  3:15 PM DO TREE Cervantes TCC [facility]   9/11/2023  2:45 PM Juan Mart PA-C  derm [facility]   12/1/2023  3:30 PM Spencer Vasquez MD fp sc [facility]

## 2023-07-31 ENCOUNTER — APPOINTMENT (OUTPATIENT)
Dept: GENERAL RADIOLOGY | Age: 73
End: 2023-07-31
Payer: MEDICARE

## 2023-07-31 ENCOUNTER — TELEPHONE (OUTPATIENT)
Dept: OTHER | Facility: CLINIC | Age: 73
End: 2023-07-31

## 2023-07-31 ENCOUNTER — HOSPITAL ENCOUNTER (EMERGENCY)
Age: 73
Discharge: HOME | End: 2023-08-01
Attending: EMERGENCY MEDICINE
Payer: MEDICARE

## 2023-07-31 DIAGNOSIS — R07.9 CHEST PAIN, UNSPECIFIED TYPE: Primary | ICD-10-CM

## 2023-07-31 LAB
ALBUMIN SERPL-MCNC: 3.7 G/DL (ref 3.5–5.2)
ALP SERPL-CCNC: 97 U/L (ref 35–104)
ALT SERPL-CCNC: 11 U/L (ref 5–33)
ANION GAP SERPL CALCULATED.3IONS-SCNC: 15 MMOL/L (ref 9–17)
AST SERPL-CCNC: 18 U/L
BACTERIA URNS QL MICRO: ABNORMAL
BASOPHILS # BLD: 0.1 K/UL (ref 0–0.2)
BASOPHILS NFR BLD: 1 % (ref 0–2)
BILIRUB SERPL-MCNC: 0.3 MG/DL (ref 0.3–1.2)
BILIRUB UR QL STRIP: NEGATIVE
BUN SERPL-MCNC: 58 MG/DL (ref 8–23)
CALCIUM SERPL-MCNC: 11.1 MG/DL (ref 8.6–10.4)
CHLORIDE SERPL-SCNC: 96 MMOL/L (ref 98–107)
CLARITY UR: ABNORMAL
CO2 SERPL-SCNC: 29 MMOL/L (ref 20–31)
COLOR UR: YELLOW
CREAT SERPL-MCNC: 5.6 MG/DL (ref 0.5–0.9)
EOSINOPHIL # BLD: 0.2 K/UL (ref 0–0.4)
EOSINOPHILS RELATIVE PERCENT: 2 % (ref 0–4)
EPI CELLS #/AREA URNS HPF: ABNORMAL /HPF
ERYTHROCYTE [DISTWIDTH] IN BLOOD BY AUTOMATED COUNT: 14.6 % (ref 11.5–14.9)
GFR SERPL CREATININE-BSD FRML MDRD: 8 ML/MIN/1.73M2
GLUCOSE SERPL-MCNC: 128 MG/DL (ref 70–99)
GLUCOSE UR STRIP-MCNC: ABNORMAL MG/DL
HCT VFR BLD AUTO: 38.4 % (ref 36–46)
HGB BLD-MCNC: 12.3 G/DL (ref 12–16)
HGB UR QL STRIP.AUTO: ABNORMAL
INR PPP: 1
KETONES UR STRIP-MCNC: NEGATIVE MG/DL
LEUKOCYTE ESTERASE UR QL STRIP: ABNORMAL
LIPASE SERPL-CCNC: 11 U/L (ref 13–60)
LYMPHOCYTES NFR BLD: 1.3 K/UL (ref 1–4.8)
LYMPHOCYTES RELATIVE PERCENT: 11 % (ref 24–44)
MAGNESIUM SERPL-MCNC: 2.1 MG/DL (ref 1.6–2.6)
MCH RBC QN AUTO: 30.1 PG (ref 26–34)
MCHC RBC AUTO-ENTMCNC: 31.9 G/DL (ref 31–37)
MCV RBC AUTO: 94.2 FL (ref 80–100)
MONOCYTES NFR BLD: 1 K/UL (ref 0.1–1.3)
MONOCYTES NFR BLD: 8 % (ref 1–7)
MYOGLOBIN SERPL-MCNC: 142 NG/ML (ref 25–58)
NEUTROPHILS NFR BLD: 78 % (ref 36–66)
NEUTS SEG NFR BLD: 9.4 K/UL (ref 1.3–9.1)
NITRITE UR QL STRIP: NEGATIVE
PARTIAL THROMBOPLASTIN TIME: 25.3 SEC (ref 24–36)
PH UR STRIP: 8 [PH] (ref 5–8)
PLATELET # BLD AUTO: 165 K/UL (ref 150–450)
PMV BLD AUTO: 8.1 FL (ref 6–12)
POTASSIUM SERPL-SCNC: 5 MMOL/L (ref 3.7–5.3)
PROT SERPL-MCNC: 6.7 G/DL (ref 6.4–8.3)
PROT UR STRIP-MCNC: ABNORMAL MG/DL
PROTHROMBIN TIME: 13.1 SEC (ref 11.8–14.6)
RBC # BLD AUTO: 4.08 M/UL (ref 4–5.2)
RBC #/AREA URNS HPF: ABNORMAL /HPF
SODIUM SERPL-SCNC: 140 MMOL/L (ref 135–144)
SP GR UR STRIP: 1.01 (ref 1–1.03)
T4 FREE SERPL-MCNC: 1.5 NG/DL (ref 0.9–1.7)
TROPONIN I SERPL HS-MCNC: 127 NG/L (ref 0–14)
TSH SERPL DL<=0.05 MIU/L-ACNC: 1.66 UIU/ML (ref 0.3–5)
UROBILINOGEN UR STRIP-ACNC: NORMAL EU/DL (ref 0–1)
WBC #/AREA URNS HPF: ABNORMAL /HPF
WBC OTHER # BLD: 11.9 K/UL (ref 3.5–11)

## 2023-07-31 PROCEDURE — 83735 ASSAY OF MAGNESIUM: CPT

## 2023-07-31 PROCEDURE — 84439 ASSAY OF FREE THYROXINE: CPT

## 2023-07-31 PROCEDURE — 93005 ELECTROCARDIOGRAM TRACING: CPT | Performed by: EMERGENCY MEDICINE

## 2023-07-31 PROCEDURE — 83690 ASSAY OF LIPASE: CPT

## 2023-07-31 PROCEDURE — 87086 URINE CULTURE/COLONY COUNT: CPT

## 2023-07-31 PROCEDURE — 84443 ASSAY THYROID STIM HORMONE: CPT

## 2023-07-31 PROCEDURE — 80053 COMPREHEN METABOLIC PANEL: CPT

## 2023-07-31 PROCEDURE — 2580000003 HC RX 258: Performed by: EMERGENCY MEDICINE

## 2023-07-31 PROCEDURE — 99285 EMERGENCY DEPT VISIT HI MDM: CPT

## 2023-07-31 PROCEDURE — 85025 COMPLETE CBC W/AUTO DIFF WBC: CPT

## 2023-07-31 PROCEDURE — 84484 ASSAY OF TROPONIN QUANT: CPT

## 2023-07-31 PROCEDURE — 85730 THROMBOPLASTIN TIME PARTIAL: CPT

## 2023-07-31 PROCEDURE — 81001 URINALYSIS AUTO W/SCOPE: CPT

## 2023-07-31 PROCEDURE — 85610 PROTHROMBIN TIME: CPT

## 2023-07-31 PROCEDURE — 36415 COLL VENOUS BLD VENIPUNCTURE: CPT

## 2023-07-31 PROCEDURE — 71045 X-RAY EXAM CHEST 1 VIEW: CPT

## 2023-07-31 PROCEDURE — 83874 ASSAY OF MYOGLOBIN: CPT

## 2023-07-31 RX ORDER — 0.9 % SODIUM CHLORIDE 0.9 %
500 INTRAVENOUS SOLUTION INTRAVENOUS ONCE
Status: COMPLETED | OUTPATIENT
Start: 2023-07-31 | End: 2023-07-31

## 2023-07-31 RX ADMIN — SODIUM CHLORIDE 500 ML: 9 INJECTION, SOLUTION INTRAVENOUS at 22:22

## 2023-07-31 ASSESSMENT — ENCOUNTER SYMPTOMS
VOMITING: 0
BACK PAIN: 0
WHEEZING: 0
COLOR CHANGE: 0
EYE PAIN: 0
SHORTNESS OF BREATH: 0
EYE DISCHARGE: 0
BLOOD IN STOOL: 0
SORE THROAT: 0
ABDOMINAL PAIN: 1
SINUS PRESSURE: 0
TROUBLE SWALLOWING: 0
RHINORRHEA: 0
CHEST TIGHTNESS: 0
NAUSEA: 1
FACIAL SWELLING: 0
EYE REDNESS: 0
CONSTIPATION: 0
COUGH: 0
DIARRHEA: 0

## 2023-07-31 NOTE — DISCHARGE INSTRUCTIONS
Formerly named [facility] and HYPERBARIC TREATMENT  CENTER      Visit  Discharge Instructions / Physician Orders  [address]:     SUPPLIES ORDERED THRU:                     DATE LAST SUPPLIED     Wound Location:  no open wounds     Cleanse with: Liquid antibacterial soap and water, rinse well      Dressing Orders:  Wear compression socks     Frequency:       Additional Orders: Increase protein to diet (meat, cheese, eggs, fish, peanut butter, nuts and beans)  Multivitamin daily    OFFLOADING [] YES  TYPE:                  [] NA    Weekly wound care visits until determined otherwise. Antibiotic therapy-wound care related YES [] NO [] NA[]    MY CHART []     Smart Device  []     HYPERBARIC TREATMENT-                TREATMENT #                          Your next appointment with the [address] [place], [place] [address]i [facility] [place] is                                                                                                    (Please note your next appointment above and if you are unable to keep, kindly give a 24 hour notice. Thank you.)  If more than 15 min late we cannot guarantee you will be seen due to clinician schedule  Per Policy, Excessive cancellation will call for dismissal from program.  If you experience any of the following, please call the [address] [place], [place] [address]i [facility] during business hours:  [phone]     * Increase in Pain  * Temperature over 101  * Increase in drainage from your wound  * Drainage with a foul odor  * Bleeding  * Increase in swelling  * Need for compression bandage changes due to slippage, breakthrough drainage. If you need medical attention outside of the business hours of the [address] [place] [address] please contact your PCP or go to the nearest emergency room. The information contained in the After Visit Summary has been reviewed with me, the patient and/or responsible adult, by my health care provider(s). I had the opportunity to ask questions regarding this information.  I have elected to receive;

## 2023-08-01 ENCOUNTER — TELEPHONE (OUTPATIENT)
Dept: FAMILY MEDICINE CLINIC | Age: 73
End: 2023-08-01

## 2023-08-01 VITALS
OXYGEN SATURATION: 96 % | HEIGHT: 59 IN | HEART RATE: 95 BPM | BODY MASS INDEX: 24.6 KG/M2 | DIASTOLIC BLOOD PRESSURE: 68 MMHG | TEMPERATURE: 98.4 F | WEIGHT: 122 LBS | SYSTOLIC BLOOD PRESSURE: 159 MMHG | RESPIRATION RATE: 19 BRPM

## 2023-08-01 PROBLEM — R29.6 RECURRENT FALLS WHILE WALKING: Status: ACTIVE | Noted: 2023-08-01

## 2023-08-01 LAB
EKG ATRIAL RATE: 105 BPM
EKG ATRIAL RATE: 375 BPM
EKG Q-T INTERVAL: 364 MS
EKG Q-T INTERVAL: 372 MS
EKG QRS DURATION: 102 MS
EKG QRS DURATION: 102 MS
EKG QTC CALCULATION (BAZETT): 452 MS
EKG QTC CALCULATION (BAZETT): 479 MS
EKG R AXIS: -6 DEGREES
EKG R AXIS: -9 DEGREES
EKG T AXIS: 142 DEGREES
EKG T AXIS: 153 DEGREES
EKG VENTRICULAR RATE: 100 BPM
EKG VENTRICULAR RATE: 93 BPM
MYOGLOBIN SERPL-MCNC: 127 NG/ML (ref 25–58)
TROPONIN I SERPL HS-MCNC: 118 NG/L (ref 0–14)

## 2023-08-01 PROCEDURE — 36415 COLL VENOUS BLD VENIPUNCTURE: CPT

## 2023-08-01 PROCEDURE — 93010 ELECTROCARDIOGRAM REPORT: CPT | Performed by: INTERNAL MEDICINE

## 2023-08-01 PROCEDURE — 83874 ASSAY OF MYOGLOBIN: CPT

## 2023-08-01 PROCEDURE — 84484 ASSAY OF TROPONIN QUANT: CPT

## 2023-08-01 PROCEDURE — 93005 ELECTROCARDIOGRAM TRACING: CPT | Performed by: EMERGENCY MEDICINE

## 2023-08-01 ASSESSMENT — HEART SCORE: ECG: 1

## 2023-08-01 NOTE — ED PROVIDER NOTES
Negative for confusion, decreased concentration, hallucinations, self-injury, sleep disturbance and suicidal ideas.       PAST MEDICAL HISTORY     Past Medical History:   Diagnosis Date    Acute CVA (cerebrovascular accident) ([address]) 07/25/2020    Acute kidney injury superimposed on CKD ([address]) 6/8/2022    Acute on chronic combined systolic (congestive) and diastolic (congestive) heart failure ([address]) 02/06/2022    Acute respiratory failure with hypoxia and hypercapnia (HCC)     JOY (acute kidney injury) ([address]) 1/15/2022    Arthritis     Asthma     Bilateral lower extremity edema 4/20/2022    Bradycardia 06/12/2022    Chronic diastolic congestive heart failure ([address]) 2/20/2020    Chronic ulcer of left leg with fat layer exposed ([address]) 6/21/2022    CKD stage 4 secondary to hypertension ([address]) 2/20/2020    Dependence on renal dialysis ([address])     ESRD (end stage renal disease) ([address])     Essential hypertension 07/25/2020    Gastrointestinal hemorrhage 7/20/2022    Hallucinations 02/18/2021    Hard of hearing     Head contusion 09/09/2020    Hyperkalemia 6/28/2022    Iron deficiency anemia, unspecified     Leg ulcer, left, with fat layer exposed ([address]) 4/20/2022    Lymphedema 5/11/2022    Meningioma ([address]) 02/25/2021    Meningioma, cerebral ([address]) 7/26/2020    Mild intermittent asthma, uncomplicated 0/08/1593    Myocardial infarction (HCC)     Nephrotic range proteinuria     Obesity (BMI 30-39.9) 6/14/2022    Old myocardial infarction 7/18/2022    Pressure injury of buttock, stage 2 ([address]) 11/25/2022    Pressure injury of sacral region, stage 2 ([address]) 11/11/2022    Pulmonary edema cardiac cause ([address])     Pure hypercholesterolemia     Sepsis ([address]) 7/12/2022    Severe malnutrition ([address]) 7/22/2022    Shock ([address])     Stage 4 chronic kidney disease ([address])     Symptomatic anemia     Toxic metabolic encephalopathy 3/97/4593    Type 2 diabetes mellitus with polyneuropathy ([address]) 2/22/2023    Type 2 diabetes mellitus with stage 4 chronic kidney

## 2023-08-01 NOTE — ED NOTES
Critical reports c/o Pushpa Santacruz (lab): Troponin = 127, Creatinine=5.6. Reported to Dr. Dang Oliva.      Kat Kapadia RN  20/04/45 [id number]

## 2023-08-01 NOTE — TELEPHONE ENCOUNTER
Writer contacted Dr Tiago Garcia to inform of 30 day readmission risk. Writer spoke with Dr Tiago Garcia who stated no decision on disposition at this time.       Call Back: If you need to call back to inform of disposition you can contact me at [phone]

## 2023-08-01 NOTE — TELEPHONE ENCOUNTER
[facility] ([facility]) ED Follow up Call    Reason for ED visit:    7/31/2023 - 8/1/2023 (3 hours)  [facility] ED  Verito Tillman MD  Last attending  Treatment team Chest pain, unspecified type  Clinical impression Nausea  Emesis  Chief complaint           Hi Carmel Ledbetter , this is David Holland from Dr. Orlando Leyva office, just calling to see how you are doing after your recent ED visit. Did you receive discharge instructions? Yes  Do you understand the discharge instructions? Yes  Did the ED give you any new prescriptions? Yes  Were you able to fill your prescriptions? Yes      Do you have one of our red, yellow and green  Zone sheets that help you to determine when you should go to the ED? Yes      Do you need or want to make a follow up appt with your PCP? Yes    Do you have any further needs in the home i.e. Equipment?   No        FU appts/Provider:    Future Appointments   Date Time Provider [address]   8/4/2023 12:45 PM MAL Lawton - CNP [facility] WND [facility]   8/9/2023  2:00 PM [facility]   8/23/2023  3:15 PM Jann Duong DO AFL T[facility]   9/11/2023  2:45 PM Jay Norris PA-C  derm [facility]   12/1/2023  3:30 PM Hodan Akins MD fp sc [facility]

## 2023-08-01 NOTE — PROGRESS NOTES
Physician Progress Note      PATIENTGenevive Bernheim  CSN #:                  [id number]  :                       1950  ADMIT DATE:       2023 11:22 AM  [address] [place] [address] DATE:        2023 3:23 PM  RESPONDING  PROVIDER #:        Benita Najera MD          QUERY TEXT:    Pt admitted with LLE cellulitis. Pt noted to have DM type 2. If possible,   please document in progress note the relationship, if any, between cellulitis   and DM. The medical record reflects the following:  Risk Factors: DM2, HgbA1c 5.7 on 5/10/23  Clinical Indicators: LLE cellulitis in setting of above risk factors; Glucose   ;  Treatment: IV Vanco, BMP daily, Regular diet, POCT Glucose x1, monitoring,  Options provided:  -- LLE cellulitis unrelated to Diabetes  -- LLE cellulitis associated with Diabetes  -- Other - I will add my own diagnosis  -- Disagree - Not applicable / Not valid  -- Disagree - Clinically unable to determine / Unknown  -- Refer to Clinical Documentation Reviewer    PROVIDER RESPONSE TEXT:    LLE cellulitis associated with Diabetes.     Query created by: Shaila Messina on 2023 9:40 AM      Electronically signed by:  Benita Najera MD 2023 12:45 PM

## 2023-08-02 ENCOUNTER — TELEPHONE (OUTPATIENT)
Dept: FAMILY MEDICINE CLINIC | Age: 73
End: 2023-08-02

## 2023-08-02 ENCOUNTER — TELEMEDICINE (OUTPATIENT)
Dept: FAMILY MEDICINE CLINIC | Age: 73
End: 2023-08-02

## 2023-08-02 DIAGNOSIS — I48.19 PERSISTENT ATRIAL FIBRILLATION (HCC): ICD-10-CM

## 2023-08-02 DIAGNOSIS — E11.42 TYPE 2 DIABETES MELLITUS WITH POLYNEUROPATHY (HCC): ICD-10-CM

## 2023-08-02 DIAGNOSIS — I87.2 VENOUS INSUFFICIENCY OF BOTH LOWER EXTREMITIES: ICD-10-CM

## 2023-08-02 DIAGNOSIS — J44.9 CHRONIC OBSTRUCTIVE PULMONARY DISEASE, UNSPECIFIED COPD TYPE (HCC): ICD-10-CM

## 2023-08-02 DIAGNOSIS — I73.9 PVD (PERIPHERAL VASCULAR DISEASE) (HCC): ICD-10-CM

## 2023-08-02 DIAGNOSIS — J96.11 CHRONIC RESPIRATORY FAILURE WITH HYPOXIA (HCC): ICD-10-CM

## 2023-08-02 DIAGNOSIS — I50.42 CHRONIC COMBINED SYSTOLIC AND DIASTOLIC CHF (CONGESTIVE HEART FAILURE) (HCC): Primary | ICD-10-CM

## 2023-08-02 DIAGNOSIS — Z99.2 HYPERTENSIVE CKD, ESRD ON DIALYSIS (HCC): ICD-10-CM

## 2023-08-02 DIAGNOSIS — I25.10 CORONARY ARTERY DISEASE INVOLVING NATIVE CORONARY ARTERY OF NATIVE HEART WITHOUT ANGINA PECTORIS: ICD-10-CM

## 2023-08-02 DIAGNOSIS — S81.801A WOUND OF RIGHT LOWER EXTREMITY, INITIAL ENCOUNTER: ICD-10-CM

## 2023-08-02 DIAGNOSIS — Z09 HOSPITAL DISCHARGE FOLLOW-UP: ICD-10-CM

## 2023-08-02 DIAGNOSIS — I12.0 HYPERTENSIVE CKD, ESRD ON DIALYSIS (HCC): ICD-10-CM

## 2023-08-02 DIAGNOSIS — N18.6 HYPERTENSIVE CKD, ESRD ON DIALYSIS (HCC): ICD-10-CM

## 2023-08-02 DIAGNOSIS — K59.01 SLOW TRANSIT CONSTIPATION: ICD-10-CM

## 2023-08-02 LAB
MICROORGANISM SPEC CULT: NO GROWTH
SPECIMEN DESCRIPTION: NORMAL

## 2023-08-02 RX ORDER — POLYETHYLENE GLYCOL 3350 17 G/17G
17 POWDER, FOR SOLUTION ORAL DAILY
Qty: 238 G | Refills: 3 | Status: SHIPPED | OUTPATIENT
Start: 2023-08-02

## 2023-08-02 ASSESSMENT — ENCOUNTER SYMPTOMS
ABDOMINAL DISTENTION: 0
VOMITING: 0
WHEEZING: 0
COUGH: 1
CONSTIPATION: 1
DIARRHEA: 0
NAUSEA: 0
ABDOMINAL PAIN: 0
CHEST TIGHTNESS: 0
SHORTNESS OF BREATH: 1

## 2023-08-02 NOTE — TELEPHONE ENCOUNTER
Noted  Future Appointments   Date Time Provider [address]   8/4/2023 12:45 PM Rodrick Olson, APRN - CNP STCZ WND CAR [facility]   8/23/2023  3:15 PM DO TREE Cervantes TCC [facility]   9/11/2023  2:45 PM Kiera Anne PA-C [facility]   12/1/2023  3:30 PM [facility]

## 2023-08-02 NOTE — TELEPHONE ENCOUNTER
Please cancel the appointment on 8/9/2023 I already spoke with her daughter    Had seen the patient today and addressed everything that was supposed for hospital follow-up    Future Appointments   Date Time Provider [address]   8/4/2023 12:45 PM MAL Nathan CNP STCZ WND [facility]   8/9/2023  2:00 PM [facility]   8/23/2023  3:15 PM DO TREE Cervantes TCC [facility]   9/11/2023  2:45 PM [facility]cc[facility]   12/1/2023  3:30 PM Cathleen Dia MD fp sc [facility]

## 2023-08-02 NOTE — PROGRESS NOTES
Post-Discharge Transitional Care Follow Up      Hussein Ojeda   YOB: 1950    Date of Office Visit:  8/2/2023  Date of Hospital Admission: 7/22/2023   Date of Hospital Discharge: 7/26/2023  Readmission Risk Score(high >=14%. Medium >=10%):Readmission Risk Score: 17.4      Care management risk score Rising risk (score 2-5) and Complex Care (Scores >=6): No Risk Score On File     Non face to face  following discharge, date last encounter closed (first attempt may have been earlier): 07/27/2023     Call initiated 2 business days of discharge: Yes     Below is the assessment and plan developed based on review of pertinent history, physical exam, labs, studies, and medications.   Chronic combined systolic and diastolic CHF (congestive heart failure) (HCC)  Stable  Continue metoprolol XL 25 Mg daily, hold if low blood pressures, and then she takes midodrine 5 Mg twice a day as needed for blood pressure below 115/65  Continue furosemide 80 mg every other day    Lab Results   Component Value Date    LVEF 63 03/14/2022           -     SD DISCHARGE MEDS RECONCILED W/ CURRENT OUTPATIENT MED LIST  Wound of right lower extremity, initial encounter  Improving  Follow-up with wound care as scheduled    -     SD DISCHARGE MEDS RECONCILED W/ CURRENT OUTPATIENT MED LIST  Type 2 diabetes mellitus with polyneuropathy ([address])  Improved  Diet controlled now  Lab Results   Component Value Date    LABA1C 5.7 05/10/2023    LABA1C 6.4 11/11/2022    LABA1C 4.6 03/31/2022       -     SD DISCHARGE MEDS RECONCILED W/ CURRENT OUTPATIENT MED LIST  Continue to monitor hemoglobin A1c every 6 months  Chronic respiratory failure with hypoxia (HCC)  Improved with oxygen at 1 to 2 L/min at nighttime  -     SD DISCHARGE MEDS RECONCILED W/ CURRENT OUTPATIENT MED LIST  Hypertensive CKD, ESRD on dialysis ([address])  Fluctuating blood pressure  On hemodialysis 3 times a week  The daughter is worried that she might have an UTI, will do urine

## 2023-08-03 NOTE — PROGRESS NOTES
Physician Progress Note      Haley Murphy  CSN #:                  [id number]  :                       1950  ADMIT DATE:       2023 11:22 AM  [address] [facility] DATE:        2023 3:23 PM  RESPONDING  PROVIDER #:        Antonia Diaomnd MD          QUERY TEXT:    Patient admitted with LLE cellulitis. Noted documentation of NSTEMI in H/P   and d/c summary. In order to support the diagnosis of NSTEMI, please refer to   4th universal definition of MI below and include additional clinical   indicators in your documentation. Or please document if the diagnosis of   NSTEMI has been ruled out after study. The medical record reflects the following:  Risk Factors: HX HTN ESRD MI  Clinical Indicators: Troponin 129-->127-->150-->146; EKG--> Atrial   fibrillation, Minimal voltage criteria for LVH, may be normal variant,   Nonspecific ST and T wave abnormality; ECHO--> estimated EF of 55 - 60%. Left   ventricle size is normal. Moderately increased wall thickness. Normal wall   motion; cardio consult-->ESRD with chronically elevated troponin, stable; no   chest pain or EKG changes noted;  Treatment: cardio consult, troponin levels x4, EKG, ECHO, Heparin TID SubQ    Fourth Universal Definition of Myocardial Infarction:  Clearly separates MI   from myocardial injury. Patients with elevated blood troponin levels but   without clinical evidence of ischemia are said to have had a myocardial   injury. ? To have a myocardial infarction requires both an elevated troponin   blood test along with at least one of the following:  - Symptoms of acute myocardial ischemia (Types 1 - 5 MI)  - Clinical evidence of ischemia, as evidenced in an electrocardiogram (EKG)   showing new ischemic changes (Type 1, Type 2, Type 3, or Type 4a MI)  - Development of pathological Q waves (Types 1 - 5 MI)  - Imaging evidence of new loss of viable myocardium or new regional wall   motion abnormality in a pattern consistent with

## 2023-08-04 ENCOUNTER — HOSPITAL ENCOUNTER (OUTPATIENT)
Dept: WOUND CARE | Age: 73
Discharge: HOME | End: 2023-08-04
Payer: MEDICARE

## 2023-08-04 VITALS
BODY MASS INDEX: 24.6 KG/M2 | DIASTOLIC BLOOD PRESSURE: 61 MMHG | HEIGHT: 59 IN | SYSTOLIC BLOOD PRESSURE: 123 MMHG | RESPIRATION RATE: 18 BRPM | WEIGHT: 122 LBS | TEMPERATURE: 97.1 F | HEART RATE: 104 BPM

## 2023-08-04 DIAGNOSIS — I87.2 VENOUS INSUFFICIENCY OF BOTH LOWER EXTREMITIES: Primary | ICD-10-CM

## 2023-08-04 PROBLEM — L97.812 ULCER OF RIGHT PRETIBIAL REGION, WITH FAT LAYER EXPOSED (HCC): Status: RESOLVED | Noted: 2022-05-20 | Resolved: 2023-08-04

## 2023-08-04 PROBLEM — L03.116 CELLULITIS OF LEFT LEG: Status: RESOLVED | Noted: 2020-07-26 | Resolved: 2023-08-04

## 2023-08-04 PROBLEM — L97.822: Status: RESOLVED | Noted: 2023-03-15 | Resolved: 2023-08-04

## 2023-08-04 PROCEDURE — 99212 OFFICE O/P EST SF 10 MIN: CPT

## 2023-08-04 PROCEDURE — 99213 OFFICE O/P EST LOW 20 MIN: CPT | Performed by: NURSE PRACTITIONER

## 2023-08-04 ASSESSMENT — ENCOUNTER SYMPTOMS
SHORTNESS OF BREATH: 0
RHINORRHEA: 0
NAUSEA: 0
COUGH: 0
DIARRHEA: 0
VOMITING: 0

## 2023-08-04 NOTE — PROGRESS NOTES
[address] [place] [address]   Progress Note and Procedure Note      Tonia Ojeda  MEDICAL RECORD NUMBER:  [id number]  AGE: 67 y.o. GENDER: female  : 1950  EPISODE DATE:  2023    Subjective:     Chief Complaint   Patient presents with    Wound Check     Right leg         HISTORY of PRESENT ILLNESS HPI     Miri Crouch is a 67 y.o. female who presents today for wound/ulcer evaluation. History of Wound Context: presents with daughter for evaluation of bilateral lower legs which are healed today. No open wounds. She does have history of venous insufficiency and lymphedema. She will follow up here as needed.    Wound/Ulcer Pain Timing/Severity: none  Quality of pain: N/A  Severity:  0 / 10   Modifying Factors: None  Associated Signs/Symptoms: none    Ulcer Identification:  Ulcer Type:  no open wounds       Wound: N/A        PAST MEDICAL HISTORY        Diagnosis Date    Acute CVA (cerebrovascular accident) ([address]) 2020    Acute kidney injury superimposed on CKD ([address]) 2022    Acute on chronic combined systolic (congestive) and diastolic (congestive) heart failure ([address]) 2022    Acute respiratory failure with hypoxia and hypercapnia (HCC)     JOY (acute kidney injury) ([address]) 1/15/2022    Arthritis     Asthma     Bilateral lower extremity edema 2022    Bradycardia 2022    Chronic diastolic congestive heart failure ([address]) 2020    Chronic ulcer of left leg with fat layer exposed ([address]) 2022    CKD stage 4 secondary to hypertension ([address]) 2020    Dependence on renal dialysis ([address])     ESRD (end stage renal disease) ([address])     Essential hypertension 2020    Gastrointestinal hemorrhage 2022    Hallucinations 2021    Hard of hearing     Head contusion 2020    Hyperkalemia 2022    Iron deficiency anemia, unspecified     Leg ulcer, left, with fat layer exposed ([address]) 2022    Lymphedema 2022    Meningioma ([address]) 2021

## 2023-08-07 ASSESSMENT — ENCOUNTER SYMPTOMS: BLOOD IN STOOL: 0

## 2023-08-09 ENCOUNTER — HOSPITAL ENCOUNTER (OUTPATIENT)
Age: 73
Setting detail: SPECIMEN
Discharge: HOME | End: 2023-08-09
Payer: MEDICARE

## 2023-08-09 DIAGNOSIS — J44.9 CHRONIC OBSTRUCTIVE PULMONARY DISEASE, UNSPECIFIED COPD TYPE (HCC): Primary | ICD-10-CM

## 2023-08-09 DIAGNOSIS — F41.9 MODERATE ANXIETY: ICD-10-CM

## 2023-08-09 LAB
BACTERIA URNS QL MICRO: ABNORMAL
BILIRUB UR QL STRIP: NEGATIVE
CLARITY UR: CLEAR
COLOR UR: YELLOW
EPI CELLS #/AREA URNS HPF: ABNORMAL /HPF
GLUCOSE UR STRIP-MCNC: ABNORMAL MG/DL
HGB UR QL STRIP.AUTO: ABNORMAL
KETONES UR STRIP-MCNC: NEGATIVE MG/DL
LEUKOCYTE ESTERASE UR QL STRIP: ABNORMAL
MUCOUS THREADS URNS QL MICRO: ABNORMAL
NITRITE UR QL STRIP: NEGATIVE
PH UR STRIP: 8 [PH] (ref 5–8)
PROT UR STRIP-MCNC: ABNORMAL MG/DL
RBC #/AREA URNS HPF: ABNORMAL /HPF
SP GR UR STRIP: 1.01 (ref 1–1.03)
UROBILINOGEN UR STRIP-ACNC: NORMAL EU/DL (ref 0–1)
WBC #/AREA URNS HPF: ABNORMAL /HPF
YEAST URNS QL MICRO: ABNORMAL

## 2023-08-09 PROCEDURE — 87088 URINE BACTERIA CULTURE: CPT

## 2023-08-09 PROCEDURE — 87086 URINE CULTURE/COLONY COUNT: CPT

## 2023-08-09 PROCEDURE — 87186 SC STD MICRODIL/AGAR DIL: CPT

## 2023-08-09 PROCEDURE — 81001 URINALYSIS AUTO W/SCOPE: CPT

## 2023-08-09 RX ORDER — ALBUTEROL SULFATE 2.5 MG/3ML
2.5 SOLUTION RESPIRATORY (INHALATION) EVERY 6 HOURS PRN
Qty: 365 EACH | Refills: 0 | Status: SHIPPED | OUTPATIENT
Start: 2023-08-09

## 2023-08-09 RX ORDER — HYDROXYZINE 50 MG/1
50 TABLET, FILM COATED ORAL DAILY PRN
Qty: 90 TABLET | Refills: 3 | Status: SHIPPED | OUTPATIENT
Start: 2023-08-09 | End: 2023-08-09

## 2023-08-09 RX ORDER — HYDROXYZINE 50 MG/1
50 TABLET, FILM COATED ORAL DAILY PRN
Qty: 90 TABLET | Refills: 3 | Status: SHIPPED | OUTPATIENT
Start: 2023-08-09 | End: 2023-08-10 | Stop reason: SDUPTHER

## 2023-08-09 RX ORDER — ALBUTEROL SULFATE 2.5 MG/3ML
2.5 SOLUTION RESPIRATORY (INHALATION) EVERY 6 HOURS PRN
Qty: 125 EACH | Refills: 0 | Status: SHIPPED | OUTPATIENT
Start: 2023-08-09 | End: 2023-08-09 | Stop reason: SDUPTHER

## 2023-08-09 NOTE — TELEPHONE ENCOUNTER
Diagnosis Orders   1. Chronic obstructive pulmonary disease, unspecified COPD type (HCC)  albuterol (PROVENTIL) (2.5 MG/3ML) 0.083% nebulizer solution    DISCONTINUED: albuterol (PROVENTIL) (2.5 MG/3ML) 0.083% nebulizer solution      2.  Moderate anxiety  hydrOXYzine HCl (ATARAX) 50 MG tablet           Future Appointments   Date Time Provider [address]   8/23/2023  3:15 PM DO TREE Cervantes TCC [facility]   8/30/2023  4:30 PM Tobias Mauro MD fp sc [facility]   9/11/2023  2:45 PM ALEKSANDRA Wayne derm [facility]   12/1/2023  3:30 PM Tobias Mauro MD fp sc [facility]

## 2023-08-09 NOTE — TELEPHONE ENCOUNTER
Patient called requesting refill on nebulizer solution. For nebulizer machine. Please Approve or Refuse.   Send to Pharmacy per Pt's Request: [facility]     Next Visit Date:  8/30/2023   Last Visit Date: 8/2/2023    Hemoglobin A1C (%)   Date Value   05/10/2023 5.7   11/11/2022 6.4   03/31/2022 4.6             ( goal A1C is < 7)   BP Readings from Last 3 Encounters:   08/04/23 123/61   08/01/23 (!) 159/68   07/24/23 (!) 149/49          (goal 120/80)  BUN   Date Value Ref Range Status   07/31/2023 58 (H) 8 - 23 mg/dL Final     Creatinine   Date Value Ref Range Status   07/31/2023 5.6 (HH) 0.5 - 0.9 mg/dL Final     Potassium   Date Value Ref Range Status   07/31/2023 5.0 3.7 - 5.3 mmol/L Final

## 2023-08-09 NOTE — TELEPHONE ENCOUNTER
Please let the patient know to  prescription from the pharmacy. Orders Placed This Encounter   Medications    albuterol (PROVENTIL) (2.5 MG/3ML) 0.083% nebulizer solution     Sig: Take 3 mLs by nebulization every 6 hours as needed for Wheezing or Shortness of Breath     Dispense:  125 each     Refill:  [facility], [address] [place] [address] [address] [address]  Phone: [phone] Fax: [phone]      No orders of the defined types were placed in this encounter. Future Appointments   Date Time Provider [address]   8/23/2023  3:15 PM Jann Duong DO AFL TCC [facility]   8/30/2023  4:30 PM [facility]   9/11/2023  2:45 PM [facility]   12/1/2023  3:30 PM [facility]omas[facility]ger, [facility]       Thank you!

## 2023-08-10 DIAGNOSIS — F41.9 MODERATE ANXIETY: ICD-10-CM

## 2023-08-10 DIAGNOSIS — N18.6 HYPERTENSIVE CKD, ESRD ON DIALYSIS (HCC): ICD-10-CM

## 2023-08-10 DIAGNOSIS — I12.0 HYPERTENSIVE CKD, ESRD ON DIALYSIS (HCC): ICD-10-CM

## 2023-08-10 DIAGNOSIS — I10 ESSENTIAL HYPERTENSION: ICD-10-CM

## 2023-08-10 DIAGNOSIS — Z99.2 HYPERTENSIVE CKD, ESRD ON DIALYSIS (HCC): ICD-10-CM

## 2023-08-10 DIAGNOSIS — I48.0 PAROXYSMAL ATRIAL FIBRILLATION (HCC): ICD-10-CM

## 2023-08-10 DIAGNOSIS — I25.10 CORONARY ARTERY DISEASE INVOLVING NATIVE CORONARY ARTERY OF NATIVE HEART WITHOUT ANGINA PECTORIS: ICD-10-CM

## 2023-08-10 RX ORDER — METOPROLOL TARTRATE 50 MG/1
TABLET, FILM COATED ORAL
Qty: 180 TABLET | Refills: 0 | Status: SHIPPED | OUTPATIENT
Start: 2023-08-10

## 2023-08-10 RX ORDER — METOPROLOL SUCCINATE 25 MG/1
25 TABLET, EXTENDED RELEASE ORAL EVERY EVENING
Qty: 90 TABLET | Refills: 3 | Status: SHIPPED | OUTPATIENT
Start: 2023-08-10

## 2023-08-10 RX ORDER — HYDROXYZINE 50 MG/1
50 TABLET, FILM COATED ORAL DAILY PRN
Qty: 90 TABLET | Refills: 3 | Status: SHIPPED | OUTPATIENT
Start: 2023-08-10

## 2023-08-10 NOTE — TELEPHONE ENCOUNTER
Please Approve or Refuse.   Send to Pharmacy per Pt's Request: [facility]     Next Visit Date:  8/30/2023   Last Visit Date: 8/2/2023    Hemoglobin A1C (%)   Date Value   05/10/2023 5.7   11/11/2022 6.4   03/31/2022 4.6             ( goal A1C is < 7)   BP Readings from Last 3 Encounters:   08/04/23 123/61   08/01/23 (!) 159/68   07/24/23 (!) 149/49          (goal 120/80)  BUN   Date Value Ref Range Status   07/31/2023 58 (H) 8 - 23 mg/dL Final     Creatinine   Date Value Ref Range Status   07/31/2023 5.6 (HH) 0.5 - 0.9 mg/dL Final     Potassium   Date Value Ref Range Status   07/31/2023 5.0 3.7 - 5.3 mmol/L Final

## 2023-08-10 NOTE — RESULT ENCOUNTER NOTE
Noted, urinalysis abnormal, pending urine culture, it is in process      Future Appointments  8/23/2023  3:15 PM    DO TREE Cervantes TCC OREG        [facility]  8/30/2023  4:30 PM    Juvencio Bryant MD     [facility]  9/11/2023  2:45 PM    Pushpa Buchanan PA-C          [facility]  12/1/2023  3:30 PM    Juvencio Bryant MD     [facility]

## 2023-08-10 NOTE — TELEPHONE ENCOUNTER
Pt daughter is now wanting medication to go to local pharmacy    Please Approve or Refuse.   Send to Pharmacy per Pt's Request: Eyal Ordoñez     Next Visit Date:  8/30/2023   Last Visit Date: 8/2/2023    Hemoglobin A1C (%)   Date Value   05/10/2023 5.7   11/11/2022 6.4   03/31/2022 4.6             ( goal A1C is < 7)   BP Readings from Last 3 Encounters:   08/04/23 123/61   08/01/23 (!) 159/68   07/24/23 (!) 149/49          (goal 120/80)  BUN   Date Value Ref Range Status   07/31/2023 58 (H) 8 - 23 mg/dL Final     Creatinine   Date Value Ref Range Status   07/31/2023 5.6 (HH) 0.5 - 0.9 mg/dL Final     Potassium   Date Value Ref Range Status   07/31/2023 5.0 3.7 - 5.3 mmol/L Final

## 2023-08-11 DIAGNOSIS — N39.0 E. COLI UTI: Primary | ICD-10-CM

## 2023-08-11 DIAGNOSIS — B96.20 E. COLI UTI: Primary | ICD-10-CM

## 2023-08-11 LAB
MICROORGANISM SPEC CULT: ABNORMAL
SPECIMEN DESCRIPTION: ABNORMAL

## 2023-08-11 RX ORDER — NITROFURANTOIN 25; 75 MG/1; MG/1
100 CAPSULE ORAL 2 TIMES DAILY
Qty: 10 CAPSULE | Refills: 0 | Status: SHIPPED | OUTPATIENT
Start: 2023-08-11

## 2023-08-11 NOTE — RESULT ENCOUNTER NOTE
Please call nephrology office or dialysis center, Dr. Khan Board is on vacation  Patient has UTI E. Coli, it is sensitive to Macrobid but it is contraindicated due to dialysisAnd end-stage kidney disease    Patient is allergic to Keflex, what do they want me to give to her.   Levaquin is also contraindicated        Future Appointments  8/23/2023  3:15 PM    Jann Duong DO              [facility]        [facility]  8/30/2023  4:30 PM    Adaline Olszewski, MD     [facility]               [facility]  9/11/2023  2:45 PM    Fredrick Pinon PA-C          [facility]             [facility]  12/1/2023  3:30 PM    [facility]     [facility]

## 2023-08-11 NOTE — PROGRESS NOTES
Diagnosis Orders   1. E. coli UTI  nitrofurantoin, macrocrystal-monohydrate, (MACROBID) 100 MG capsule           Future Appointments   Date Time Provider [address]   8/23/2023  3:15 PM DO TREE Cervantes [facility]   8/30/2023  4:30 PM [facility]   9/11/2023  2:45 PM [facility]   12/1/2023  3:30 PM Carlitos Rice MD [facility]

## 2023-08-11 NOTE — RESULT ENCOUNTER NOTE
Zen Weeks stated she called [facility], she is scheduled for dialysis tomorrow, and they will give to the provider in dialysis and address the UTI, urine culture and sensitivity was sent to dialysis    She also informed Carlyle Razo, patient's daughter  Future Appointments  8/23/2023  3:15 PM    DO TREE Cervantes [facility]        [facility]  8/30/2023  4:30 PM    [facility]illo Yung [facility]     [facility]  9/11/2023  2:45 PM    Kiera Anne PA-C          [facility]             [facility]  12/1/2023  3:30 PM    Carrillo Yung MD     [facility]

## 2023-08-11 NOTE — RESULT ENCOUNTER NOTE
Noted, Macrobid sent to pharmacy, 100 mg twice a day for 5 days, I did send prescription to [facility], I spoke with her daughter,Sea and she will pick it up from the pharmacy      Future Appointments  8/23/2023  3:15 PM    Jann Duong DO              [address]  8/30/2023  4:30 PM    Joe Stevens MD     [facility]               [facility]  9/11/2023  2:45 PM    Laurel Lyon PA-C          [facility]             [facility]  12/1/2023  3:30 PM    Joe Stevens [facility]     [facility]

## 2023-08-15 ENCOUNTER — HOSPITAL ENCOUNTER (EMERGENCY)
Age: 73
Discharge: ANOTHER HOSPITAL | DRG: 054 | End: 2023-08-16
Attending: EMERGENCY MEDICINE
Payer: MEDICARE

## 2023-08-15 ENCOUNTER — APPOINTMENT (OUTPATIENT)
Dept: CT IMAGING | Age: 73
DRG: 054 | End: 2023-08-15
Payer: MEDICARE

## 2023-08-15 DIAGNOSIS — R41.82 ALTERED MENTAL STATUS, UNSPECIFIED ALTERED MENTAL STATUS TYPE: Primary | ICD-10-CM

## 2023-08-15 DIAGNOSIS — G93.6 BRAIN EDEMA (HCC): ICD-10-CM

## 2023-08-15 LAB
ALBUMIN SERPL-MCNC: 3.8 G/DL (ref 3.5–5.2)
ALP SERPL-CCNC: 106 U/L (ref 35–104)
ALT SERPL-CCNC: 10 U/L (ref 5–33)
ANION GAP SERPL CALCULATED.3IONS-SCNC: 14 MMOL/L (ref 9–17)
AST SERPL-CCNC: 19 U/L
BASOPHILS # BLD: 0 K/UL (ref 0–0.2)
BASOPHILS NFR BLD: 1 % (ref 0–2)
BILIRUB SERPL-MCNC: 0.5 MG/DL (ref 0.3–1.2)
BUN SERPL-MCNC: 13 MG/DL (ref 8–23)
CALCIUM SERPL-MCNC: 10.9 MG/DL (ref 8.6–10.4)
CHLORIDE SERPL-SCNC: 95 MMOL/L (ref 98–107)
CO2 SERPL-SCNC: 30 MMOL/L (ref 20–31)
CREAT SERPL-MCNC: 2.6 MG/DL (ref 0.5–0.9)
EOSINOPHIL # BLD: 0.1 K/UL (ref 0–0.4)
EOSINOPHILS RELATIVE PERCENT: 2 % (ref 0–4)
ERYTHROCYTE [DISTWIDTH] IN BLOOD BY AUTOMATED COUNT: 15.1 % (ref 11.5–14.9)
GFR SERPL CREATININE-BSD FRML MDRD: 19 ML/MIN/1.73M2
GLUCOSE BLD-MCNC: 102 MG/DL (ref 65–105)
GLUCOSE SERPL-MCNC: 96 MG/DL (ref 70–99)
HCT VFR BLD AUTO: 35.7 % (ref 36–46)
HGB BLD-MCNC: 11.7 G/DL (ref 12–16)
LACTATE BLDV-SCNC: 1.5 MMOL/L (ref 0.5–2.2)
LYMPHOCYTES NFR BLD: 0.7 K/UL (ref 1–4.8)
LYMPHOCYTES RELATIVE PERCENT: 11 % (ref 24–44)
MCH RBC QN AUTO: 30.2 PG (ref 26–34)
MCHC RBC AUTO-ENTMCNC: 32.7 G/DL (ref 31–37)
MCV RBC AUTO: 92.3 FL (ref 80–100)
MONOCYTES NFR BLD: 0.6 K/UL (ref 0.1–1.3)
MONOCYTES NFR BLD: 10 % (ref 1–7)
NEUTROPHILS NFR BLD: 76 % (ref 36–66)
NEUTS SEG NFR BLD: 4.8 K/UL (ref 1.3–9.1)
PLATELET # BLD AUTO: 135 K/UL (ref 150–450)
PMV BLD AUTO: 8.3 FL (ref 6–12)
POTASSIUM SERPL-SCNC: 4 MMOL/L (ref 3.7–5.3)
PROT SERPL-MCNC: 6.5 G/DL (ref 6.4–8.3)
RBC # BLD AUTO: 3.87 M/UL (ref 4–5.2)
SODIUM SERPL-SCNC: 139 MMOL/L (ref 135–144)
TROPONIN I SERPL HS-MCNC: 93 NG/L (ref 0–14)
TROPONIN I SERPL HS-MCNC: 97 NG/L (ref 0–14)
TSH SERPL DL<=0.05 MIU/L-ACNC: 2.5 UIU/ML (ref 0.3–5)
WBC OTHER # BLD: 6.2 K/UL (ref 3.5–11)

## 2023-08-15 PROCEDURE — 87205 SMEAR GRAM STAIN: CPT

## 2023-08-15 PROCEDURE — 83605 ASSAY OF LACTIC ACID: CPT

## 2023-08-15 PROCEDURE — 2580000003 HC RX 258

## 2023-08-15 PROCEDURE — 84443 ASSAY THYROID STIM HORMONE: CPT

## 2023-08-15 PROCEDURE — 80053 COMPREHEN METABOLIC PANEL: CPT

## 2023-08-15 PROCEDURE — 96365 THER/PROPH/DIAG IV INF INIT: CPT

## 2023-08-15 PROCEDURE — 6360000002 HC RX W HCPCS

## 2023-08-15 PROCEDURE — 36415 COLL VENOUS BLD VENIPUNCTURE: CPT

## 2023-08-15 PROCEDURE — 87040 BLOOD CULTURE FOR BACTERIA: CPT

## 2023-08-15 PROCEDURE — 70450 CT HEAD/BRAIN W/O DYE: CPT

## 2023-08-15 PROCEDURE — 99285 EMERGENCY DEPT VISIT HI MDM: CPT

## 2023-08-15 PROCEDURE — 96375 TX/PRO/DX INJ NEW DRUG ADDON: CPT

## 2023-08-15 PROCEDURE — 87077 CULTURE AEROBIC IDENTIFY: CPT

## 2023-08-15 PROCEDURE — 82947 ASSAY GLUCOSE BLOOD QUANT: CPT

## 2023-08-15 PROCEDURE — 84484 ASSAY OF TROPONIN QUANT: CPT

## 2023-08-15 PROCEDURE — 85025 COMPLETE CBC W/AUTO DIFF WBC: CPT

## 2023-08-15 RX ORDER — DEXAMETHASONE SODIUM PHOSPHATE 4 MG/ML
4 INJECTION, SOLUTION INTRA-ARTICULAR; INTRALESIONAL; INTRAMUSCULAR; INTRAVENOUS; SOFT TISSUE EVERY 6 HOURS
Status: DISCONTINUED | OUTPATIENT
Start: 2023-08-15 | End: 2023-08-16 | Stop reason: HOSPADM

## 2023-08-15 RX ADMIN — DEXAMETHASONE SODIUM PHOSPHATE 4 MG: 4 INJECTION, SOLUTION INTRAMUSCULAR; INTRAVENOUS at 21:38

## 2023-08-15 RX ADMIN — SODIUM CHLORIDE 500 MG: 9 INJECTION, SOLUTION INTRAVENOUS at 21:42

## 2023-08-15 ASSESSMENT — ENCOUNTER SYMPTOMS
SHORTNESS OF BREATH: 0
ABDOMINAL DISTENTION: 0
CONSTIPATION: 0
COUGH: 0
NAUSEA: 0
CHEST TIGHTNESS: 0
VOMITING: 0
CHOKING: 0
ABDOMINAL PAIN: 0
DIARRHEA: 0

## 2023-08-15 ASSESSMENT — PAIN - FUNCTIONAL ASSESSMENT: PAIN_FUNCTIONAL_ASSESSMENT: NONE - DENIES PAIN

## 2023-08-15 NOTE — ED PROVIDER NOTES
[facility],[address] Floor ED  Emergency Department Encounter  Emergency Medicine Resident     Pt Cat Ojeda  MRN: [id number]  [address] 1950  Date of evaluation: 8/15/23  PCP:  Bridgett Rosa MD  Note Started: 7:10 PM EDT      CHIEF COMPLAINT       Chief Complaint   Patient presents with    Altered Mental Status       HISTORY OF PRESENT ILLNESS  (Location/Symptom, Timing/Onset, Context/Setting, Quality, Duration, Modifying Factors, Severity.)      Shreya Ramirez is a 67 y.o. female who presents with reports of altered mental status. Patient is brought in by her daughters who state that over the past 2 days the patient has had intermittent episodes of confusion. In the emergency department the patient is currently alert and oriented x4. The patient's children state the patient has recently had decrease in her vision due to cataracts which has caused her to not sleep as much. Patient states that she is not having any chest pain, shortness of breath, fever, chills, nausea, vomiting or abdominal pain. Of note the patient was recently diagnosed with a urinary tract infection, the patient states that she has been compliant with her medications. Patient is on dialysis, went to dialysis this morning and the patient's children states that she was more confused after getting home from dialysis.     PAST MEDICAL / SURGICAL / SOCIAL / FAMILY HISTORY      has a past medical history of Acute CVA (cerebrovascular accident) ([address]), Acute kidney injury superimposed on CKD ([address]), Acute on chronic combined systolic (congestive) and diastolic (congestive) heart failure ([address]), Acute respiratory failure with hypoxia and hypercapnia ([address]), JOY (acute kidney injury) ([address]), Arthritis, Asthma, Bilateral lower extremity edema, Bradycardia, Chronic diastolic congestive heart failure ([address]), Chronic ulcer of left leg with fat layer exposed ([address]), CKD stage 4 secondary to hypertension ([address]), Dependence on renal dialysis

## 2023-08-15 NOTE — ED TRIAGE NOTES
Mode of arrival (squad #, walk in, police, etc) : walk-in        Chief complaint(s): AMS        Arrival Note (brief scenario, treatment PTA, etc). : family states patient was normal at 0930 this morning, went to dialysis and has been hallucinating and confused. C= \"Have you ever felt that you should Cut down on your drinking? \"  No  A= \"Have people Annoyed you by criticizing your drinking? \"  No  G= \"Have you ever felt bad or Guilty about your drinking? \"  No  E= \"Have you ever had a drink as an Eye-opener first thing in the morning to steady your nerves or to help a hangover? \"  No      Deferred []      Reason for deferring: N/A    *If yes to two or more: probable alcohol abuse. *

## 2023-08-16 ENCOUNTER — APPOINTMENT (OUTPATIENT)
Dept: MRI IMAGING | Age: 73
DRG: 054 | End: 2023-08-16
Attending: PSYCHIATRY & NEUROLOGY
Payer: MEDICARE

## 2023-08-16 ENCOUNTER — HOSPITAL ENCOUNTER (INPATIENT)
Age: 73
LOS: 4 days | Discharge: HOME | DRG: 054 | End: 2023-08-20
Attending: PSYCHIATRY & NEUROLOGY | Admitting: PSYCHIATRY & NEUROLOGY
Payer: MEDICARE

## 2023-08-16 VITALS
RESPIRATION RATE: 16 BRPM | WEIGHT: 122 LBS | SYSTOLIC BLOOD PRESSURE: 128 MMHG | DIASTOLIC BLOOD PRESSURE: 62 MMHG | HEIGHT: 59 IN | HEART RATE: 82 BPM | TEMPERATURE: 98.1 F | BODY MASS INDEX: 24.6 KG/M2 | OXYGEN SATURATION: 95 %

## 2023-08-16 DIAGNOSIS — A42.9 ACTINOMYCES INFECTION: ICD-10-CM

## 2023-08-16 DIAGNOSIS — R68.89 DECREASED STRENGTH, ENDURANCE, AND MOBILITY: ICD-10-CM

## 2023-08-16 DIAGNOSIS — Z99.2 ESRD (END STAGE RENAL DISEASE) ON DIALYSIS (HCC): ICD-10-CM

## 2023-08-16 DIAGNOSIS — N18.6 ESRD (END STAGE RENAL DISEASE) ON DIALYSIS (HCC): ICD-10-CM

## 2023-08-16 DIAGNOSIS — Z74.09 DECREASED STRENGTH, ENDURANCE, AND MOBILITY: ICD-10-CM

## 2023-08-16 DIAGNOSIS — R53.1 DECREASED STRENGTH, ENDURANCE, AND MOBILITY: ICD-10-CM

## 2023-08-16 DIAGNOSIS — R78.81 BACTEREMIA: Primary | ICD-10-CM

## 2023-08-16 PROBLEM — G93.89 RIGHT TEMPORAL LOBE MASS: Status: ACTIVE | Noted: 2023-08-16

## 2023-08-16 PROCEDURE — 6360000002 HC RX W HCPCS

## 2023-08-16 PROCEDURE — 2580000003 HC RX 258

## 2023-08-16 PROCEDURE — 2500000003 HC RX 250 WO HCPCS

## 2023-08-16 PROCEDURE — 99223 1ST HOSP IP/OBS HIGH 75: CPT | Performed by: INTERNAL MEDICINE

## 2023-08-16 PROCEDURE — 99223 1ST HOSP IP/OBS HIGH 75: CPT | Performed by: NEUROLOGICAL SURGERY

## 2023-08-16 PROCEDURE — 2000000000 HC ICU R&B

## 2023-08-16 PROCEDURE — 6370000000 HC RX 637 (ALT 250 FOR IP)

## 2023-08-16 PROCEDURE — 6360000002 HC RX W HCPCS: Performed by: STUDENT IN AN ORGANIZED HEALTH CARE EDUCATION/TRAINING PROGRAM

## 2023-08-16 PROCEDURE — 99222 1ST HOSP IP/OBS MODERATE 55: CPT | Performed by: PSYCHIATRY & NEUROLOGY

## 2023-08-16 RX ORDER — CALCIUM ACETATE 667 MG/1
2 CAPSULE ORAL
Status: DISCONTINUED | OUTPATIENT
Start: 2023-08-16 | End: 2023-08-20 | Stop reason: HOSPADM

## 2023-08-16 RX ORDER — LEVETIRACETAM 500 MG/1
250 TABLET ORAL NIGHTLY
Status: DISCONTINUED | OUTPATIENT
Start: 2023-08-17 | End: 2023-08-16

## 2023-08-16 RX ORDER — ONDANSETRON 2 MG/ML
4 INJECTION INTRAMUSCULAR; INTRAVENOUS EVERY 6 HOURS PRN
Status: DISCONTINUED | OUTPATIENT
Start: 2023-08-16 | End: 2023-08-20 | Stop reason: HOSPADM

## 2023-08-16 RX ORDER — FUROSEMIDE 40 MG/1
80 TABLET ORAL EVERY OTHER DAY
Status: DISCONTINUED | OUTPATIENT
Start: 2023-08-16 | End: 2023-08-20 | Stop reason: HOSPADM

## 2023-08-16 RX ORDER — ACETAMINOPHEN 325 MG/1
650 TABLET ORAL EVERY 6 HOURS PRN
Status: DISCONTINUED | OUTPATIENT
Start: 2023-08-16 | End: 2023-08-20 | Stop reason: HOSPADM

## 2023-08-16 RX ORDER — SODIUM CHLORIDE 0.9 % (FLUSH) 0.9 %
5-40 SYRINGE (ML) INJECTION EVERY 12 HOURS SCHEDULED
Status: DISCONTINUED | OUTPATIENT
Start: 2023-08-16 | End: 2023-08-20 | Stop reason: HOSPADM

## 2023-08-16 RX ORDER — ASPIRIN 81 MG/1
81 TABLET ORAL DAILY
Status: DISCONTINUED | OUTPATIENT
Start: 2023-08-16 | End: 2023-08-18

## 2023-08-16 RX ORDER — ACETAMINOPHEN 650 MG/1
650 SUPPOSITORY RECTAL EVERY 6 HOURS PRN
Status: DISCONTINUED | OUTPATIENT
Start: 2023-08-16 | End: 2023-08-20 | Stop reason: HOSPADM

## 2023-08-16 RX ORDER — SODIUM CHLORIDE 0.9 % (FLUSH) 0.9 %
5-40 SYRINGE (ML) INJECTION PRN
Status: DISCONTINUED | OUTPATIENT
Start: 2023-08-16 | End: 2023-08-20 | Stop reason: HOSPADM

## 2023-08-16 RX ORDER — SODIUM CHLORIDE 9 MG/ML
INJECTION, SOLUTION INTRAVENOUS PRN
Status: DISCONTINUED | OUTPATIENT
Start: 2023-08-16 | End: 2023-08-20 | Stop reason: HOSPADM

## 2023-08-16 RX ORDER — LEVOTHYROXINE SODIUM 0.05 MG/1
25 TABLET ORAL
Status: DISCONTINUED | OUTPATIENT
Start: 2023-08-16 | End: 2023-08-20 | Stop reason: HOSPADM

## 2023-08-16 RX ORDER — METOPROLOL SUCCINATE 25 MG/1
25 TABLET, EXTENDED RELEASE ORAL EVERY EVENING
Status: DISCONTINUED | OUTPATIENT
Start: 2023-08-16 | End: 2023-08-20 | Stop reason: HOSPADM

## 2023-08-16 RX ORDER — LEVETIRACETAM 5 MG/ML
500 INJECTION INTRAVASCULAR
Status: DISCONTINUED | OUTPATIENT
Start: 2023-08-16 | End: 2023-08-16

## 2023-08-16 RX ORDER — LEVETIRACETAM 500 MG/1
500 TABLET ORAL DAILY
Status: DISCONTINUED | OUTPATIENT
Start: 2023-08-17 | End: 2023-08-17

## 2023-08-16 RX ORDER — LEVETIRACETAM 500 MG/1
500 TABLET ORAL 2 TIMES DAILY
Status: DISCONTINUED | OUTPATIENT
Start: 2023-08-16 | End: 2023-08-16

## 2023-08-16 RX ORDER — HYDROXYZINE 50 MG/1
50 TABLET, FILM COATED ORAL DAILY PRN
Status: DISCONTINUED | OUTPATIENT
Start: 2023-08-16 | End: 2023-08-20 | Stop reason: HOSPADM

## 2023-08-16 RX ORDER — DEXAMETHASONE SODIUM PHOSPHATE 4 MG/ML
4 INJECTION, SOLUTION INTRA-ARTICULAR; INTRALESIONAL; INTRAMUSCULAR; INTRAVENOUS; SOFT TISSUE EVERY 6 HOURS
Status: DISCONTINUED | OUTPATIENT
Start: 2023-08-16 | End: 2023-08-20 | Stop reason: HOSPADM

## 2023-08-16 RX ORDER — LEVETIRACETAM 5 MG/ML
500 INJECTION INTRAVASCULAR
Status: DISCONTINUED | OUTPATIENT
Start: 2023-08-17 | End: 2023-08-20 | Stop reason: HOSPADM

## 2023-08-16 RX ORDER — HEPARIN SODIUM 5000 [USP'U]/ML
5000 INJECTION, SOLUTION INTRAVENOUS; SUBCUTANEOUS EVERY 8 HOURS SCHEDULED
Status: DISCONTINUED | OUTPATIENT
Start: 2023-08-16 | End: 2023-08-20 | Stop reason: HOSPADM

## 2023-08-16 RX ORDER — MIDODRINE HYDROCHLORIDE 5 MG/1
5 TABLET ORAL 2 TIMES DAILY PRN
Status: DISCONTINUED | OUTPATIENT
Start: 2023-08-16 | End: 2023-08-20 | Stop reason: HOSPADM

## 2023-08-16 RX ORDER — ATORVASTATIN CALCIUM 40 MG/1
40 TABLET, FILM COATED ORAL EVERY EVENING
Status: DISCONTINUED | OUTPATIENT
Start: 2023-08-16 | End: 2023-08-20 | Stop reason: HOSPADM

## 2023-08-16 RX ORDER — FLUTICASONE PROPIONATE 50 MCG
1 SPRAY, SUSPENSION (ML) NASAL DAILY
Status: DISCONTINUED | OUTPATIENT
Start: 2023-08-16 | End: 2023-08-20 | Stop reason: HOSPADM

## 2023-08-16 RX ORDER — AMIODARONE HYDROCHLORIDE 200 MG/1
100 TABLET ORAL EVERY MORNING
Status: DISCONTINUED | OUTPATIENT
Start: 2023-08-16 | End: 2023-08-20 | Stop reason: HOSPADM

## 2023-08-16 RX ORDER — ONDANSETRON 4 MG/1
4 TABLET, ORALLY DISINTEGRATING ORAL EVERY 8 HOURS PRN
Status: DISCONTINUED | OUTPATIENT
Start: 2023-08-16 | End: 2023-08-20 | Stop reason: HOSPADM

## 2023-08-16 RX ORDER — LORAZEPAM 2 MG/ML
1 INJECTION INTRAMUSCULAR
Status: COMPLETED | OUTPATIENT
Start: 2023-08-16 | End: 2023-08-16

## 2023-08-16 RX ORDER — NITROFURANTOIN 25; 75 MG/1; MG/1
100 CAPSULE ORAL 2 TIMES DAILY
Status: DISCONTINUED | OUTPATIENT
Start: 2023-08-16 | End: 2023-08-16

## 2023-08-16 RX ORDER — POLYETHYLENE GLYCOL 3350 17 G/17G
17 POWDER, FOR SOLUTION ORAL DAILY PRN
Status: DISCONTINUED | OUTPATIENT
Start: 2023-08-16 | End: 2023-08-20 | Stop reason: HOSPADM

## 2023-08-16 RX ORDER — DEXAMETHASONE SODIUM PHOSPHATE 4 MG/ML
4 INJECTION, SOLUTION INTRA-ARTICULAR; INTRALESIONAL; INTRAMUSCULAR; INTRAVENOUS; SOFT TISSUE EVERY 24 HOURS
Status: DISCONTINUED | OUTPATIENT
Start: 2023-08-16 | End: 2023-08-16

## 2023-08-16 RX ORDER — METOPROLOL TARTRATE 50 MG/1
50 TABLET, FILM COATED ORAL 2 TIMES DAILY
Status: DISCONTINUED | OUTPATIENT
Start: 2023-08-16 | End: 2023-08-16

## 2023-08-16 RX ORDER — PANTOPRAZOLE SODIUM 40 MG/1
40 TABLET, DELAYED RELEASE ORAL
Status: DISCONTINUED | OUTPATIENT
Start: 2023-08-16 | End: 2023-08-20 | Stop reason: HOSPADM

## 2023-08-16 RX ADMIN — HEPARIN SODIUM 5000 UNITS: 5000 INJECTION INTRAVENOUS; SUBCUTANEOUS at 13:57

## 2023-08-16 RX ADMIN — DEXAMETHASONE SODIUM PHOSPHATE 4 MG: 4 INJECTION, SOLUTION INTRA-ARTICULAR; INTRALESIONAL; INTRAMUSCULAR; INTRAVENOUS; SOFT TISSUE at 05:14

## 2023-08-16 RX ADMIN — METOPROLOL SUCCINATE 25 MG: 25 TABLET, EXTENDED RELEASE ORAL at 17:32

## 2023-08-16 RX ADMIN — DEXAMETHASONE SODIUM PHOSPHATE 4 MG: 4 INJECTION, SOLUTION INTRA-ARTICULAR; INTRALESIONAL; INTRAMUSCULAR; INTRAVENOUS; SOFT TISSUE at 12:13

## 2023-08-16 RX ADMIN — CEFTRIAXONE SODIUM 1000 MG: 10 INJECTION, POWDER, FOR SOLUTION INTRAVENOUS at 20:57

## 2023-08-16 RX ADMIN — CALCIUM ACETATE 1334 MG: 667 CAPSULE ORAL at 12:13

## 2023-08-16 RX ADMIN — LORAZEPAM 1 MG: 2 INJECTION INTRAMUSCULAR; INTRAVENOUS at 22:18

## 2023-08-16 RX ADMIN — SODIUM CHLORIDE, PRESERVATIVE FREE 10 ML: 5 INJECTION INTRAVENOUS at 20:55

## 2023-08-16 RX ADMIN — LEVETIRACETAM 500 MG: 500 TABLET, FILM COATED ORAL at 08:45

## 2023-08-16 RX ADMIN — CALCIUM ACETATE 1334 MG: 667 CAPSULE ORAL at 17:32

## 2023-08-16 RX ADMIN — ATORVASTATIN CALCIUM 40 MG: 40 TABLET, FILM COATED ORAL at 17:32

## 2023-08-16 RX ADMIN — FUROSEMIDE 80 MG: 40 TABLET ORAL at 08:45

## 2023-08-16 RX ADMIN — CALCIUM ACETATE 1334 MG: 667 CAPSULE ORAL at 08:44

## 2023-08-16 RX ADMIN — DEXAMETHASONE SODIUM PHOSPHATE 4 MG: 4 INJECTION, SOLUTION INTRA-ARTICULAR; INTRALESIONAL; INTRAMUSCULAR; INTRAVENOUS; SOFT TISSUE at 20:56

## 2023-08-16 RX ADMIN — HEPARIN SODIUM 5000 UNITS: 5000 INJECTION INTRAVENOUS; SUBCUTANEOUS at 05:16

## 2023-08-16 RX ADMIN — MIDODRINE HYDROCHLORIDE 5 MG: 5 TABLET ORAL at 20:57

## 2023-08-16 RX ADMIN — LEVOTHYROXINE SODIUM 25 MCG: 50 TABLET ORAL at 08:45

## 2023-08-16 RX ADMIN — PANTOPRAZOLE SODIUM 40 MG: 40 TABLET, DELAYED RELEASE ORAL at 08:45

## 2023-08-16 RX ADMIN — ASPIRIN 81 MG: 81 TABLET, COATED ORAL at 08:45

## 2023-08-16 RX ADMIN — AMIODARONE HYDROCHLORIDE 100 MG: 200 TABLET ORAL at 08:45

## 2023-08-16 RX ADMIN — HYDROXYZINE HYDROCHLORIDE 50 MG: 50 TABLET ORAL at 08:45

## 2023-08-16 RX ADMIN — FLUTICASONE PROPIONATE 1 SPRAY: 50 SPRAY, METERED NASAL at 20:55

## 2023-08-16 RX ADMIN — SODIUM CHLORIDE, PRESERVATIVE FREE 10 ML: 5 INJECTION INTRAVENOUS at 08:56

## 2023-08-16 RX ADMIN — DEXAMETHASONE SODIUM PHOSPHATE 4 MG: 4 INJECTION, SOLUTION INTRA-ARTICULAR; INTRALESIONAL; INTRAMUSCULAR; INTRAVENOUS; SOFT TISSUE at 17:34

## 2023-08-16 ASSESSMENT — PAIN SCALES - GENERAL: PAINLEVEL_OUTOF10: 0

## 2023-08-16 NOTE — CARE COORDINATION
Case Management Assessment  Initial Evaluation    Date/Time of Evaluation: 8/16/2023 11:03 AM  Assessment Completed by: Pato Schwartz RN    If patient is discharged prior to next notation, then this note serves as note for discharge by case management. 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 Yes    Chart Reviewed: Yes      History Provided by: Child/Family ([employer])  Patient Orientation: Other (see comment) (pt confused and hallucinating)    Patient Cognition: Alert    Hospitalization in the last 30 days (Readmission):  Yes    If yes, Readmission Assessment in CM Navigator will be completed. Advance Directives:      Code Status: Full Code   Patient's Primary Decision Maker is: Legal Next of Kin    Primary Decision Maker: Bonny Gitelman - Child - [phone]    Primary Decision Maker: Nevaehmaribeth Dickenssyed - Child - [phone]    Primary Decision Maker: Eboni Angel Child - [phone]    Primary Decision Maker: Monica Griffithsmaribeth Child - [phone]    Discharge Planning:    Patient lives with: Family Members Type of Home: House  Primary Care Giver: Family  Patient Support Systems include: Children   Current Financial resources: Medicare  Current community resources: Other (Comment)  Current services prior to admission: Other (Comment) (T/[facility] Dialysis- Viola Gibson)            Current DME: Bedside Commode, Wheelchair            Type of Home Care services:  None    ADLS  Prior functional level: Independent in ADLs/IADLs  Current functional level: Independent in ADLs/IADLs    PT AM-PAC:   /24  OT AM-PAC:   /24    Family can provide assistance at DC:  Yes  Would you like Case Management to discuss the

## 2023-08-16 NOTE — ED NOTES
[facility]     Nurse 2 Nurse   [phone]    ETA 11-1130pm     Ean Diamond  08/15/23 1344

## 2023-08-16 NOTE — ED NOTES
TRANSFER - OUT REPORT:    Verbal report given to [address] [place] [address] Bed 106 on Ellen Enrique  being transferred to [address] [place] [address]  for {Transfer RWL}       Report consisted of patient's Situation, Background, Assessment and   Recommendations(SBAR). Information from the following report(s) {SBAR Reports List:86659} was reviewed with the receiving nurse. [place] Assessment:    Presents to emergency department  because of falls (Syncope, seizure, or loss of consciousness): No  Age > 70: Yes  Altered Mental Status, Intoxication with alcohol or substance confusion (Disorientation, impaired judgment, poor safety awaremess, or inability to follow instructions): Yes  Impaired Mobility: Ambulates or transfers with assistive devices or assistance; Unable to ambulate or transer.: No  Nursing Judgement: No          Lines:   Peripheral IV 08/15/23 Left Forearm (Active)       Hemodialysis Central Access Right Subclavian (Active)        Opportunity for questions and clarification was provided.       Patient transported with:  {Transport Details:92148}

## 2023-08-16 NOTE — H&P
tiotropium (SPIRIVA RESPIMAT) 2.5 MCG/ACT AERS inhaler Inhale 2 puffs into the lungs every morning (before breakfast) 7/19/23   Cathleen Dia MD   levothyroxine (SYNTHROID) 25 MCG tablet Take 1 tablet by mouth every morning (before breakfast) without no other meds for 30 minutes, take when you first wake up and you are still bed 7/13/23   Cathleen Dia MD   calcium acetate (PHOSLO) 667 MG CAPS capsule Take 2 capsules by mouth 3 times daily (with meals) 7/11/23   Yo Maria MD   amiodarone (PACERONE) 100 MG tablet Take 1 tablet by mouth every morning 7/11/23   Cathleen Dia MD   midodrine (PROAMATINE) 5 MG tablet Take 1 tablet by mouth 2 times daily as needed (if low BP below 115/65, take to dialysis) 7/11/23   Cathleen Dia MD   furosemide (LASIX) 80 MG tablet Take 1 tablet by mouth every other day Water pill, does not take on dialysis days. 7/11/23   Cathleen Dia MD   atorvastatin (LIPITOR) 40 MG tablet Take 1 tablet by mouth every evening Take 1 tablet by mouth once daily 7/11/23   Cathleen Dia MD   omeprazole (PRILOSEC) 20 MG delayed release capsule TAKE 1 CAPSULE BY MOUTH ONCE DAILY IN THE MORNING BEFORE BREAKFAST 7/11/23   Cathleen Dia MD   acetaminophen (TYLENOL) 500 MG tablet Take 1 tablet by mouth every 6 hours as needed for Pain or Fever    Historical Provider, MD   Elastic Bandages & Supports (KNEE BRACE/FLEX STAYS MEDIUM) MISC as directed 2/15/23   Historical Provider, MD   amLODIPine (NORVASC) 10 MG tablet Take 1 tablet by mouth nightly 6/11/22 7/18/22  Avelina Murguia MD   Blood Pressure KIT Diagnosis: HTN. Needs to check blood pressure 1-2 times a day until stable, then once a day. Goal blood pressure less than 135/85, and above 110/60. 5/26/22   Cathleen Dia MD   Nebulizers (COMPRESSOR/NEBULIZER) MISC Nebulizer with compressor. Disposable Med Nebs 2 /month. Reusable Med Nebs 1 per 6 months. Aerosol Mask 1 per month.  Replacement Filters 2

## 2023-08-17 ENCOUNTER — APPOINTMENT (OUTPATIENT)
Dept: MRI IMAGING | Age: 73
DRG: 054 | End: 2023-08-17
Attending: PSYCHIATRY & NEUROLOGY
Payer: MEDICARE

## 2023-08-17 PROBLEM — G93.40 ENCEPHALOPATHY: Status: ACTIVE | Noted: 2023-08-17

## 2023-08-17 PROBLEM — Z86.39 HISTORY OF TYPE 2 DIABETES MELLITUS: Status: ACTIVE | Noted: 2023-08-17

## 2023-08-17 PROBLEM — Z86.79 HISTORY OF CHRONIC ATRIAL FIBRILLATION: Status: ACTIVE | Noted: 2023-08-17

## 2023-08-17 LAB
ANION GAP SERPL CALCULATED.3IONS-SCNC: 13 MMOL/L (ref 9–17)
BASOPHILS # BLD: 0.06 K/UL (ref 0–0.2)
BASOPHILS NFR BLD: 1 % (ref 0–2)
BUN SERPL-MCNC: 36 MG/DL (ref 8–23)
CALCIUM SERPL-MCNC: 9.9 MG/DL (ref 8.6–10.4)
CHLORIDE SERPL-SCNC: 90 MMOL/L (ref 98–107)
CO2 SERPL-SCNC: 24 MMOL/L (ref 20–31)
CORTIS SERPL-MCNC: 1.9 UG/DL (ref 2.7–18.4)
CREAT SERPL-MCNC: 4.3 MG/DL (ref 0.5–0.9)
EOSINOPHIL # BLD: 0 K/UL (ref 0–0.44)
EOSINOPHILS RELATIVE PERCENT: 0 % (ref 1–4)
ERYTHROCYTE [DISTWIDTH] IN BLOOD BY AUTOMATED COUNT: 14.3 % (ref 11.8–14.4)
GFR SERPL CREATININE-BSD FRML MDRD: 10 ML/MIN/1.73M2
GLUCOSE SERPL-MCNC: 155 MG/DL (ref 70–99)
HCT VFR BLD AUTO: 36 % (ref 36.3–47.1)
HGB BLD-MCNC: 11.1 G/DL (ref 11.9–15.1)
IMM GRANULOCYTES # BLD AUTO: 0.12 K/UL (ref 0–0.3)
IMM GRANULOCYTES NFR BLD: 2 %
LYMPHOCYTES NFR BLD: 0.6 K/UL (ref 1.1–3.7)
LYMPHOCYTES RELATIVE PERCENT: 10 % (ref 24–43)
MCH RBC QN AUTO: 32.1 PG (ref 25.2–33.5)
MCHC RBC AUTO-ENTMCNC: 30.8 G/DL (ref 28.4–34.8)
MCV RBC AUTO: 104 FL (ref 82.6–102.9)
MICROORGANISM SPEC CULT: ABNORMAL
MONOCYTES NFR BLD: 0.3 K/UL (ref 0.1–1.2)
MONOCYTES NFR BLD: 5 % (ref 3–12)
MORPHOLOGY: ABNORMAL
NEUTROPHILS NFR BLD: 82 % (ref 36–65)
NEUTS SEG NFR BLD: 4.92 K/UL (ref 1.5–8.1)
NRBC BLD-RTO: 0 PER 100 WBC
PLATELET # BLD AUTO: ABNORMAL K/UL (ref 138–453)
PLATELET, FLUORESCENCE: 155 K/UL (ref 138–453)
PLATELETS.RETICULATED NFR BLD AUTO: 3.6 % (ref 1.1–10.3)
POTASSIUM SERPL-SCNC: 4.6 MMOL/L (ref 3.7–5.3)
RBC # BLD AUTO: 3.46 M/UL (ref 3.95–5.11)
SERVICE CMNT-IMP: ABNORMAL
SODIUM SERPL-SCNC: 127 MMOL/L (ref 135–144)
SPECIMEN DESCRIPTION: ABNORMAL
WBC OTHER # BLD: 6 K/UL (ref 3.5–11.3)

## 2023-08-17 PROCEDURE — 6360000002 HC RX W HCPCS: Performed by: PSYCHIATRY & NEUROLOGY

## 2023-08-17 PROCEDURE — 85055 RETICULATED PLATELET ASSAY: CPT

## 2023-08-17 PROCEDURE — 36415 COLL VENOUS BLD VENIPUNCTURE: CPT

## 2023-08-17 PROCEDURE — 6360000002 HC RX W HCPCS: Performed by: INTERNAL MEDICINE

## 2023-08-17 PROCEDURE — 2580000003 HC RX 258

## 2023-08-17 PROCEDURE — 99232 SBSQ HOSP IP/OBS MODERATE 35: CPT | Performed by: INTERNAL MEDICINE

## 2023-08-17 PROCEDURE — 6370000000 HC RX 637 (ALT 250 FOR IP)

## 2023-08-17 PROCEDURE — 97163 PT EVAL HIGH COMPLEX 45 MIN: CPT

## 2023-08-17 PROCEDURE — 99232 SBSQ HOSP IP/OBS MODERATE 35: CPT | Performed by: PSYCHIATRY & NEUROLOGY

## 2023-08-17 PROCEDURE — 82533 TOTAL CORTISOL: CPT

## 2023-08-17 PROCEDURE — 6360000002 HC RX W HCPCS

## 2023-08-17 PROCEDURE — 97530 THERAPEUTIC ACTIVITIES: CPT

## 2023-08-17 PROCEDURE — 80048 BASIC METABOLIC PNL TOTAL CA: CPT

## 2023-08-17 PROCEDURE — 2500000003 HC RX 250 WO HCPCS

## 2023-08-17 PROCEDURE — 90935 HEMODIALYSIS ONE EVALUATION: CPT

## 2023-08-17 PROCEDURE — 2000000000 HC ICU R&B

## 2023-08-17 PROCEDURE — 97167 OT EVAL HIGH COMPLEX 60 MIN: CPT

## 2023-08-17 PROCEDURE — 85025 COMPLETE CBC W/AUTO DIFF WBC: CPT

## 2023-08-17 PROCEDURE — 99222 1ST HOSP IP/OBS MODERATE 55: CPT | Performed by: INTERNAL MEDICINE

## 2023-08-17 PROCEDURE — 70551 MRI BRAIN STEM W/O DYE: CPT

## 2023-08-17 PROCEDURE — 5A1D70Z PERFORMANCE OF URINARY FILTRATION, INTERMITTENT, LESS THAN 6 HOURS PER DAY: ICD-10-PCS | Performed by: INTERNAL MEDICINE

## 2023-08-17 PROCEDURE — 97535 SELF CARE MNGMENT TRAINING: CPT

## 2023-08-17 RX ORDER — LEVETIRACETAM 500 MG/1
500 TABLET ORAL DAILY
Status: DISCONTINUED | OUTPATIENT
Start: 2023-08-18 | End: 2023-08-20 | Stop reason: HOSPADM

## 2023-08-17 RX ORDER — HEPARIN SODIUM 1000 [USP'U]/ML
1600 INJECTION, SOLUTION INTRAVENOUS; SUBCUTANEOUS PRN
Status: DISCONTINUED | OUTPATIENT
Start: 2023-08-17 | End: 2023-08-20 | Stop reason: HOSPADM

## 2023-08-17 RX ORDER — LEVETIRACETAM 500 MG/1
1000 TABLET ORAL DAILY
Status: DISCONTINUED | OUTPATIENT
Start: 2023-08-18 | End: 2023-08-17

## 2023-08-17 RX ORDER — LORAZEPAM 2 MG/ML
1 INJECTION INTRAMUSCULAR ONCE
Status: COMPLETED | OUTPATIENT
Start: 2023-08-17 | End: 2023-08-17

## 2023-08-17 RX ADMIN — PANTOPRAZOLE SODIUM 40 MG: 40 TABLET, DELAYED RELEASE ORAL at 05:47

## 2023-08-17 RX ADMIN — ATORVASTATIN CALCIUM 40 MG: 40 TABLET, FILM COATED ORAL at 21:41

## 2023-08-17 RX ADMIN — DEXAMETHASONE SODIUM PHOSPHATE 4 MG: 4 INJECTION, SOLUTION INTRA-ARTICULAR; INTRALESIONAL; INTRAMUSCULAR; INTRAVENOUS; SOFT TISSUE at 21:43

## 2023-08-17 RX ADMIN — DEXAMETHASONE SODIUM PHOSPHATE 4 MG: 4 INJECTION, SOLUTION INTRA-ARTICULAR; INTRALESIONAL; INTRAMUSCULAR; INTRAVENOUS; SOFT TISSUE at 10:33

## 2023-08-17 RX ADMIN — HEPARIN SODIUM 1600 UNITS: 1000 INJECTION INTRAVENOUS; SUBCUTANEOUS at 19:33

## 2023-08-17 RX ADMIN — FLUTICASONE PROPIONATE 1 SPRAY: 50 SPRAY, METERED NASAL at 08:04

## 2023-08-17 RX ADMIN — SODIUM CHLORIDE, PRESERVATIVE FREE 10 ML: 5 INJECTION INTRAVENOUS at 21:41

## 2023-08-17 RX ADMIN — LORAZEPAM 1 MG: 2 INJECTION INTRAMUSCULAR; INTRAVENOUS at 14:05

## 2023-08-17 RX ADMIN — AMIODARONE HYDROCHLORIDE 100 MG: 200 TABLET ORAL at 08:02

## 2023-08-17 RX ADMIN — DEXAMETHASONE SODIUM PHOSPHATE 4 MG: 4 INJECTION, SOLUTION INTRA-ARTICULAR; INTRALESIONAL; INTRAMUSCULAR; INTRAVENOUS; SOFT TISSUE at 04:45

## 2023-08-17 RX ADMIN — CALCIUM ACETATE 1334 MG: 667 CAPSULE ORAL at 08:04

## 2023-08-17 RX ADMIN — LEVETIRACETAM 500 MG: 500 TABLET, FILM COATED ORAL at 08:02

## 2023-08-17 RX ADMIN — HYDROXYZINE HYDROCHLORIDE 50 MG: 50 TABLET ORAL at 21:41

## 2023-08-17 RX ADMIN — LEVOTHYROXINE SODIUM 25 MCG: 50 TABLET ORAL at 05:43

## 2023-08-17 RX ADMIN — SODIUM CHLORIDE, PRESERVATIVE FREE 10 ML: 5 INJECTION INTRAVENOUS at 08:00

## 2023-08-17 RX ADMIN — CEFTRIAXONE SODIUM 1000 MG: 10 INJECTION, POWDER, FOR SOLUTION INTRAVENOUS at 21:43

## 2023-08-17 RX ADMIN — METOPROLOL SUCCINATE 25 MG: 25 TABLET, EXTENDED RELEASE ORAL at 21:41

## 2023-08-17 RX ADMIN — HEPARIN SODIUM 1600 UNITS: 1000 INJECTION INTRAVENOUS; SUBCUTANEOUS at 19:32

## 2023-08-17 ASSESSMENT — PAIN SCALES - GENERAL
PAINLEVEL_OUTOF10: 0
PAINLEVEL_OUTOF10: 0

## 2023-08-17 NOTE — CARE COORDINATION
08/17/23 0718   Readmission Assessment   Number of Days since last admission? 1-7 days  (not a readmit, transfer from [facility])   Previous Disposition Other (comment)  (not a readmit, transfer from [facility])   Who is being Linus Coyne Patient  (not a readmit, transfer from [facility])   What was the patient's/caregiver's perception as to why they think they needed to return back to the hospital? Other (Comment)  (not a readmit, transfer from [facility])   Did you visit your Primary Care Physician after you left the hospital, before you returned this time? No  (not a readmit, transfer from [facility])   Why weren't you able to visit your PCP? Other (Comment)  (not a readmit, transfer from [facility])   Did you see a specialist, such as Cardiac, Pulmonary, Orthopedic Physician, etc. after you left the hospital? No  (not a readmit, transfer from [facility])   Who advised the patient to return to the hospital? Other (Comment)  (not a readmit, transfer from [facility])   Does the patient report anything that got in the way of taking their medications? No  (not a readmit, transfer from [facility])   In our efforts to provide the best possible care to you and others like you, can you think of anything that we could have done to help you after you left the hospital the first time, so that you might not have needed to return so soon?  Other (Comment)  (not a readmit, transfer from [facility])

## 2023-08-18 PROBLEM — A42.9 ACTINOMYCES INFECTION: Status: ACTIVE | Noted: 2023-08-18

## 2023-08-18 PROBLEM — R78.81 BACTEREMIA: Status: ACTIVE | Noted: 2023-08-18

## 2023-08-18 LAB
ANION GAP SERPL CALCULATED.3IONS-SCNC: 16 MMOL/L (ref 9–17)
BASOPHILS # BLD: 0 K/UL (ref 0–0.2)
BASOPHILS NFR BLD: 0 % (ref 0–2)
BUN SERPL-MCNC: 14 MG/DL (ref 8–23)
CALCIUM SERPL-MCNC: 8.1 MG/DL (ref 8.6–10.4)
CHLORIDE SERPL-SCNC: 95 MMOL/L (ref 98–107)
CO2 SERPL-SCNC: 17 MMOL/L (ref 20–31)
CREAT SERPL-MCNC: 2.5 MG/DL (ref 0.5–0.9)
CRP SERPL HS-MCNC: <3 MG/L (ref 0–5)
EOSINOPHIL # BLD: 0 K/UL (ref 0–0.44)
EOSINOPHILS RELATIVE PERCENT: 0 % (ref 1–4)
ERYTHROCYTE [DISTWIDTH] IN BLOOD BY AUTOMATED COUNT: 14.4 % (ref 11.8–14.4)
GFR SERPL CREATININE-BSD FRML MDRD: 20 ML/MIN/1.73M2
GLUCOSE SERPL-MCNC: 121 MG/DL (ref 70–99)
HCT VFR BLD AUTO: 40.1 % (ref 36.3–47.1)
HGB BLD-MCNC: 11.7 G/DL (ref 11.9–15.1)
IMM GRANULOCYTES # BLD AUTO: 0.1 K/UL (ref 0–0.3)
IMM GRANULOCYTES NFR BLD: 1 %
LYMPHOCYTES NFR BLD: 0.5 K/UL (ref 1.1–3.7)
LYMPHOCYTES RELATIVE PERCENT: 5 % (ref 24–43)
MCH RBC QN AUTO: 30.2 PG (ref 25.2–33.5)
MCHC RBC AUTO-ENTMCNC: 29.2 G/DL (ref 28.4–34.8)
MCV RBC AUTO: 103.6 FL (ref 82.6–102.9)
MONOCYTES NFR BLD: 0.4 K/UL (ref 0.1–1.2)
MONOCYTES NFR BLD: 4 % (ref 3–12)
MORPHOLOGY: ABNORMAL
NEUTROPHILS NFR BLD: 90 % (ref 36–65)
NEUTS SEG NFR BLD: 8.9 K/UL (ref 1.5–8.1)
NRBC BLD-RTO: 0 PER 100 WBC
PLATELET # BLD AUTO: 176 K/UL (ref 138–453)
PMV BLD AUTO: 9.9 FL (ref 8.1–13.5)
POTASSIUM SERPL-SCNC: 4.3 MMOL/L (ref 3.7–5.3)
RBC # BLD AUTO: 3.87 M/UL (ref 3.95–5.11)
SODIUM SERPL-SCNC: 128 MMOL/L (ref 135–144)
WBC OTHER # BLD: 9.9 K/UL (ref 3.5–11.3)

## 2023-08-18 PROCEDURE — 80048 BASIC METABOLIC PNL TOTAL CA: CPT

## 2023-08-18 PROCEDURE — 99232 SBSQ HOSP IP/OBS MODERATE 35: CPT | Performed by: INTERNAL MEDICINE

## 2023-08-18 PROCEDURE — 94640 AIRWAY INHALATION TREATMENT: CPT

## 2023-08-18 PROCEDURE — 2000000000 HC ICU R&B

## 2023-08-18 PROCEDURE — 36415 COLL VENOUS BLD VENIPUNCTURE: CPT

## 2023-08-18 PROCEDURE — 87040 BLOOD CULTURE FOR BACTERIA: CPT

## 2023-08-18 PROCEDURE — 2500000003 HC RX 250 WO HCPCS

## 2023-08-18 PROCEDURE — 51798 US URINE CAPACITY MEASURE: CPT

## 2023-08-18 PROCEDURE — 6370000000 HC RX 637 (ALT 250 FOR IP): Performed by: PSYCHIATRY & NEUROLOGY

## 2023-08-18 PROCEDURE — 6360000002 HC RX W HCPCS

## 2023-08-18 PROCEDURE — 6360000002 HC RX W HCPCS: Performed by: STUDENT IN AN ORGANIZED HEALTH CARE EDUCATION/TRAINING PROGRAM

## 2023-08-18 PROCEDURE — 6370000000 HC RX 637 (ALT 250 FOR IP)

## 2023-08-18 PROCEDURE — 95819 EEG AWAKE AND ASLEEP: CPT | Performed by: PSYCHIATRY & NEUROLOGY

## 2023-08-18 PROCEDURE — 86140 C-REACTIVE PROTEIN: CPT

## 2023-08-18 PROCEDURE — 85025 COMPLETE CBC W/AUTO DIFF WBC: CPT

## 2023-08-18 PROCEDURE — 99222 1ST HOSP IP/OBS MODERATE 55: CPT | Performed by: INTERNAL MEDICINE

## 2023-08-18 PROCEDURE — APPSS30 APP SPLIT SHARED TIME 16-30 MINUTES: Performed by: NURSE PRACTITIONER

## 2023-08-18 PROCEDURE — 2580000003 HC RX 258

## 2023-08-18 PROCEDURE — 99232 SBSQ HOSP IP/OBS MODERATE 35: CPT | Performed by: PSYCHIATRY & NEUROLOGY

## 2023-08-18 PROCEDURE — 97530 THERAPEUTIC ACTIVITIES: CPT

## 2023-08-18 PROCEDURE — 97110 THERAPEUTIC EXERCISES: CPT

## 2023-08-18 RX ORDER — ALBUTEROL SULFATE 2.5 MG/3ML
2.5 SOLUTION RESPIRATORY (INHALATION) EVERY 6 HOURS PRN
Status: DISCONTINUED | OUTPATIENT
Start: 2023-08-18 | End: 2023-08-20 | Stop reason: HOSPADM

## 2023-08-18 RX ORDER — BUDESONIDE AND FORMOTEROL FUMARATE DIHYDRATE 160; 4.5 UG/1; UG/1
2 AEROSOL RESPIRATORY (INHALATION)
Status: DISCONTINUED | OUTPATIENT
Start: 2023-08-18 | End: 2023-08-20 | Stop reason: HOSPADM

## 2023-08-18 RX ORDER — ASPIRIN 81 MG/1
81 TABLET, CHEWABLE ORAL DAILY
Status: DISCONTINUED | OUTPATIENT
Start: 2023-08-18 | End: 2023-08-20 | Stop reason: HOSPADM

## 2023-08-18 RX ADMIN — LEVOTHYROXINE SODIUM 25 MCG: 50 TABLET ORAL at 06:16

## 2023-08-18 RX ADMIN — ASPIRIN 81 MG 81 MG: 81 TABLET ORAL at 13:36

## 2023-08-18 RX ADMIN — LEVETIRACETAM 500 MG: 5 INJECTION, SOLUTION INTRAVENOUS at 01:13

## 2023-08-18 RX ADMIN — DEXAMETHASONE SODIUM PHOSPHATE 4 MG: 4 INJECTION, SOLUTION INTRA-ARTICULAR; INTRALESIONAL; INTRAMUSCULAR; INTRAVENOUS; SOFT TISSUE at 04:30

## 2023-08-18 RX ADMIN — DEXAMETHASONE SODIUM PHOSPHATE 4 MG: 4 INJECTION, SOLUTION INTRA-ARTICULAR; INTRALESIONAL; INTRAMUSCULAR; INTRAVENOUS; SOFT TISSUE at 13:36

## 2023-08-18 RX ADMIN — LEVETIRACETAM 500 MG: 500 TABLET, FILM COATED ORAL at 08:05

## 2023-08-18 RX ADMIN — METOPROLOL SUCCINATE 25 MG: 25 TABLET, EXTENDED RELEASE ORAL at 20:52

## 2023-08-18 RX ADMIN — CEFTRIAXONE SODIUM 1000 MG: 10 INJECTION, POWDER, FOR SOLUTION INTRAVENOUS at 18:06

## 2023-08-18 RX ADMIN — CALCIUM ACETATE 1334 MG: 667 CAPSULE ORAL at 08:06

## 2023-08-18 RX ADMIN — PANTOPRAZOLE SODIUM 40 MG: 40 TABLET, DELAYED RELEASE ORAL at 06:16

## 2023-08-18 RX ADMIN — FUROSEMIDE 80 MG: 40 TABLET ORAL at 08:07

## 2023-08-18 RX ADMIN — ATORVASTATIN CALCIUM 40 MG: 40 TABLET, FILM COATED ORAL at 20:51

## 2023-08-18 RX ADMIN — AMIODARONE HYDROCHLORIDE 100 MG: 200 TABLET ORAL at 08:05

## 2023-08-18 RX ADMIN — DEXAMETHASONE SODIUM PHOSPHATE 4 MG: 4 INJECTION, SOLUTION INTRA-ARTICULAR; INTRALESIONAL; INTRAMUSCULAR; INTRAVENOUS; SOFT TISSUE at 20:50

## 2023-08-18 RX ADMIN — BUDESONIDE AND FORMOTEROL FUMARATE DIHYDRATE 2 PUFF: 160; 4.5 AEROSOL RESPIRATORY (INHALATION) at 20:32

## 2023-08-18 RX ADMIN — SODIUM CHLORIDE, PRESERVATIVE FREE 10 ML: 5 INJECTION INTRAVENOUS at 20:51

## 2023-08-18 RX ADMIN — SODIUM CHLORIDE, PRESERVATIVE FREE 5 ML: 5 INJECTION INTRAVENOUS at 08:07

## 2023-08-18 RX ADMIN — CALCIUM ACETATE 1334 MG: 667 CAPSULE ORAL at 20:52

## 2023-08-18 ASSESSMENT — ENCOUNTER SYMPTOMS
TROUBLE SWALLOWING: 0
SHORTNESS OF BREATH: 0
BLOOD IN STOOL: 0

## 2023-08-18 NOTE — CARE COORDINATION
Spoke with patient's daughter Robbin Kenney at bedside to discuss transitional planning. SNF and HHC options discussed. Robbin Kenney declines SNF and HHC, stating that her mother will be returning home with family care at discharge. [place] that patient has no additional transitional needs at this time.

## 2023-08-18 NOTE — PROCEDURES
EEG REPORT       Patient: Erica Sánchez Age: 75 y.o. MRN: [id number]      Referring Provider: Collins Sanderson MD    History: This routine 30 minute scalp EEG was recorded with video- monitoring for a 75 y.o. Bhavana Goodwin female  who presented with encephalopathy. This EEG was performed to evaluate for focal and epileptiform abnormalities.      Andres Ojeda   Current Facility-Administered Medications   Medication Dose Route Frequency Provider Last Rate Last Admin    albuterol (PROVENTIL) (2.5 MG/3ML) 0.083% nebulizer solution 2.5 mg  2.5 mg Nebulization Q6H PRN Hayden Mckeon MD        [START ON 8/19/2023] tiotropium (SPIRIVA RESPIMAT) 2.5 MCG/ACT inhaler 2 puff  2 puff Inhalation QAM AC Hayden Mckeon MD        budesonide-formoterol [facility]) 160-4.5 MCG/ACT inhaler 2 puff  2 puff Inhalation BID RT Hayden Mckeon MD        aspirin chewable tablet 81 mg  81 mg Oral Daily Hayden Mckeon MD   81 mg at 08/18/23 1336    levETIRAcetam (KEPPRA) tablet 500 mg  500 mg Oral Daily Forrest S Tolla, DO   500 mg at 08/18/23 0805    heparin (porcine) injection 1,600 Units  1,600 Units IntraCATHeter PRN Marylou Prater MD   1,600 Units at 08/17/23 1933    heparin (porcine) injection 1,600 Units  1,600 Units IntraCATHeter PRN Marylou Prater MD   1,600 Units at 08/17/23 1932    sodium chloride flush 0.9 % injection 5-40 mL  5-40 mL IntraVENous 2 times per day Gama Etienne MD   5 mL at 08/18/23 0807    sodium chloride flush 0.9 % injection 5-40 mL  5-40 mL IntraVENous PRN Gama Etienne MD        0.9 % sodium chloride infusion   IntraVENous PRN Gama Etienne MD        ondansetron (ZOFRAN-ODT) disintegrating tablet 4 mg  4 mg Oral Q8H PRN Gama Etienne MD        Or    ondansetron (ZOFRAN) injection 4 mg  4 mg IntraVENous Q6H PRN Gama Etienne MD        polyethylene glycol (GLYCOLAX) packet 17 g  17 g Oral Daily PRN Gama Etienne MD        acetaminophen (TYLENOL) tablet 650 mg  650 mg Oral Q6H PRN Gama Etienne MD

## 2023-08-19 PROBLEM — Z99.2 ESRD (END STAGE RENAL DISEASE) ON DIALYSIS (HCC): Status: ACTIVE | Noted: 2023-08-19

## 2023-08-19 PROBLEM — N18.6 ESRD (END STAGE RENAL DISEASE) ON DIALYSIS (HCC): Status: ACTIVE | Noted: 2023-08-19

## 2023-08-19 LAB
ANION GAP SERPL CALCULATED.3IONS-SCNC: 12 MMOL/L (ref 9–17)
BASOPHILS # BLD: 0 K/UL (ref 0–0.2)
BASOPHILS NFR BLD: 0 % (ref 0–2)
BUN SERPL-MCNC: 42 MG/DL (ref 8–23)
CALCIUM SERPL-MCNC: 9.1 MG/DL (ref 8.6–10.4)
CHLORIDE SERPL-SCNC: 99 MMOL/L (ref 98–107)
CO2 SERPL-SCNC: 22 MMOL/L (ref 20–31)
CREAT SERPL-MCNC: 4.1 MG/DL (ref 0.5–0.9)
EOSINOPHIL # BLD: 0 K/UL (ref 0–0.44)
EOSINOPHILS RELATIVE PERCENT: 0 % (ref 1–4)
ERYTHROCYTE [DISTWIDTH] IN BLOOD BY AUTOMATED COUNT: 14 % (ref 11.8–14.4)
GFR SERPL CREATININE-BSD FRML MDRD: 11 ML/MIN/1.73M2
GLUCOSE BLD-MCNC: 299 MG/DL (ref 65–105)
GLUCOSE BLD-MCNC: 326 MG/DL (ref 65–105)
GLUCOSE SERPL-MCNC: 197 MG/DL (ref 70–99)
HCT VFR BLD AUTO: 34.9 % (ref 36.3–47.1)
HGB BLD-MCNC: 10.7 G/DL (ref 11.9–15.1)
IMM GRANULOCYTES # BLD AUTO: 0.14 K/UL (ref 0–0.3)
IMM GRANULOCYTES NFR BLD: 2 %
LYMPHOCYTES NFR BLD: 0.56 K/UL (ref 1.1–3.7)
LYMPHOCYTES RELATIVE PERCENT: 8 % (ref 24–43)
MCH RBC QN AUTO: 30.2 PG (ref 25.2–33.5)
MCHC RBC AUTO-ENTMCNC: 30.7 G/DL (ref 28.4–34.8)
MCV RBC AUTO: 98.6 FL (ref 82.6–102.9)
MONOCYTES NFR BLD: 0.35 K/UL (ref 0.1–1.2)
MONOCYTES NFR BLD: 5 % (ref 3–12)
MORPHOLOGY: NORMAL
NEUTROPHILS NFR BLD: 85 % (ref 36–65)
NEUTS SEG NFR BLD: 5.95 K/UL (ref 1.5–8.1)
NRBC BLD-RTO: 0 PER 100 WBC
PLATELET # BLD AUTO: 153 K/UL (ref 138–453)
PMV BLD AUTO: 10.2 FL (ref 8.1–13.5)
POTASSIUM SERPL-SCNC: 4.9 MMOL/L (ref 3.7–5.3)
RBC # BLD AUTO: 3.54 M/UL (ref 3.95–5.11)
SODIUM SERPL-SCNC: 133 MMOL/L (ref 135–144)
WBC OTHER # BLD: 7 K/UL (ref 3.5–11.3)

## 2023-08-19 PROCEDURE — 6370000000 HC RX 637 (ALT 250 FOR IP)

## 2023-08-19 PROCEDURE — 99232 SBSQ HOSP IP/OBS MODERATE 35: CPT | Performed by: INTERNAL MEDICINE

## 2023-08-19 PROCEDURE — 2580000003 HC RX 258

## 2023-08-19 PROCEDURE — 99232 SBSQ HOSP IP/OBS MODERATE 35: CPT | Performed by: PSYCHIATRY & NEUROLOGY

## 2023-08-19 PROCEDURE — 36415 COLL VENOUS BLD VENIPUNCTURE: CPT

## 2023-08-19 PROCEDURE — 6360000002 HC RX W HCPCS

## 2023-08-19 PROCEDURE — 80048 BASIC METABOLIC PNL TOTAL CA: CPT

## 2023-08-19 PROCEDURE — 90935 HEMODIALYSIS ONE EVALUATION: CPT

## 2023-08-19 PROCEDURE — 2000000000 HC ICU R&B

## 2023-08-19 PROCEDURE — 85025 COMPLETE CBC W/AUTO DIFF WBC: CPT

## 2023-08-19 PROCEDURE — 6360000002 HC RX W HCPCS: Performed by: STUDENT IN AN ORGANIZED HEALTH CARE EDUCATION/TRAINING PROGRAM

## 2023-08-19 PROCEDURE — 6370000000 HC RX 637 (ALT 250 FOR IP): Performed by: PSYCHIATRY & NEUROLOGY

## 2023-08-19 PROCEDURE — 6360000002 HC RX W HCPCS: Performed by: INTERNAL MEDICINE

## 2023-08-19 PROCEDURE — 2500000003 HC RX 250 WO HCPCS

## 2023-08-19 PROCEDURE — 94640 AIRWAY INHALATION TREATMENT: CPT

## 2023-08-19 PROCEDURE — 82947 ASSAY GLUCOSE BLOOD QUANT: CPT

## 2023-08-19 RX ORDER — INSULIN LISPRO 100 [IU]/ML
0-4 INJECTION, SOLUTION INTRAVENOUS; SUBCUTANEOUS NIGHTLY
Status: DISCONTINUED | OUTPATIENT
Start: 2023-08-19 | End: 2023-08-19

## 2023-08-19 RX ORDER — INSULIN LISPRO 100 [IU]/ML
0-4 INJECTION, SOLUTION INTRAVENOUS; SUBCUTANEOUS
Status: DISCONTINUED | OUTPATIENT
Start: 2023-08-19 | End: 2023-08-20 | Stop reason: HOSPADM

## 2023-08-19 RX ORDER — DEXTROSE MONOHYDRATE 100 MG/ML
INJECTION, SOLUTION INTRAVENOUS CONTINUOUS PRN
Status: DISCONTINUED | OUTPATIENT
Start: 2023-08-19 | End: 2023-08-20 | Stop reason: HOSPADM

## 2023-08-19 RX ORDER — INSULIN LISPRO 100 [IU]/ML
0-4 INJECTION, SOLUTION INTRAVENOUS; SUBCUTANEOUS
Status: DISCONTINUED | OUTPATIENT
Start: 2023-08-19 | End: 2023-08-19

## 2023-08-19 RX ORDER — INSULIN LISPRO 100 [IU]/ML
0-4 INJECTION, SOLUTION INTRAVENOUS; SUBCUTANEOUS NIGHTLY
Status: DISCONTINUED | OUTPATIENT
Start: 2023-08-19 | End: 2023-08-20 | Stop reason: HOSPADM

## 2023-08-19 RX ADMIN — SODIUM CHLORIDE, PRESERVATIVE FREE 10 ML: 5 INJECTION INTRAVENOUS at 22:20

## 2023-08-19 RX ADMIN — LEVOTHYROXINE SODIUM 25 MCG: 50 TABLET ORAL at 05:14

## 2023-08-19 RX ADMIN — DEXAMETHASONE SODIUM PHOSPHATE 4 MG: 4 INJECTION, SOLUTION INTRA-ARTICULAR; INTRALESIONAL; INTRAMUSCULAR; INTRAVENOUS; SOFT TISSUE at 22:20

## 2023-08-19 RX ADMIN — CALCIUM ACETATE 1334 MG: 667 CAPSULE ORAL at 12:23

## 2023-08-19 RX ADMIN — CALCIUM ACETATE 1334 MG: 667 CAPSULE ORAL at 18:01

## 2023-08-19 RX ADMIN — DEXAMETHASONE SODIUM PHOSPHATE 4 MG: 4 INJECTION, SOLUTION INTRA-ARTICULAR; INTRALESIONAL; INTRAMUSCULAR; INTRAVENOUS; SOFT TISSUE at 03:22

## 2023-08-19 RX ADMIN — LEVETIRACETAM 500 MG: 500 TABLET, FILM COATED ORAL at 09:04

## 2023-08-19 RX ADMIN — FLUTICASONE PROPIONATE 1 SPRAY: 50 SPRAY, METERED NASAL at 09:08

## 2023-08-19 RX ADMIN — TIOTROPIUM BROMIDE INHALATION SPRAY 2 PUFF: 3.12 SPRAY, METERED RESPIRATORY (INHALATION) at 10:24

## 2023-08-19 RX ADMIN — BUDESONIDE AND FORMOTEROL FUMARATE DIHYDRATE 2 PUFF: 160; 4.5 AEROSOL RESPIRATORY (INHALATION) at 21:11

## 2023-08-19 RX ADMIN — BUDESONIDE AND FORMOTEROL FUMARATE DIHYDRATE 2 PUFF: 160; 4.5 AEROSOL RESPIRATORY (INHALATION) at 10:25

## 2023-08-19 RX ADMIN — AMIODARONE HYDROCHLORIDE 100 MG: 200 TABLET ORAL at 09:03

## 2023-08-19 RX ADMIN — ASPIRIN 81 MG 81 MG: 81 TABLET ORAL at 09:04

## 2023-08-19 RX ADMIN — DEXAMETHASONE SODIUM PHOSPHATE 4 MG: 4 INJECTION, SOLUTION INTRA-ARTICULAR; INTRALESIONAL; INTRAMUSCULAR; INTRAVENOUS; SOFT TISSUE at 09:07

## 2023-08-19 RX ADMIN — ATORVASTATIN CALCIUM 40 MG: 40 TABLET, FILM COATED ORAL at 18:02

## 2023-08-19 RX ADMIN — CALCIUM ACETATE 1334 MG: 667 CAPSULE ORAL at 09:05

## 2023-08-19 RX ADMIN — HEPARIN SODIUM 1600 UNITS: 1000 INJECTION INTRAVENOUS; SUBCUTANEOUS at 16:44

## 2023-08-19 RX ADMIN — DEXAMETHASONE SODIUM PHOSPHATE 4 MG: 4 INJECTION, SOLUTION INTRA-ARTICULAR; INTRALESIONAL; INTRAMUSCULAR; INTRAVENOUS; SOFT TISSUE at 18:37

## 2023-08-19 RX ADMIN — CEFTRIAXONE SODIUM 1000 MG: 10 INJECTION, POWDER, FOR SOLUTION INTRAVENOUS at 18:37

## 2023-08-19 RX ADMIN — METOPROLOL SUCCINATE 25 MG: 25 TABLET, EXTENDED RELEASE ORAL at 18:09

## 2023-08-19 RX ADMIN — PANTOPRAZOLE SODIUM 40 MG: 40 TABLET, DELAYED RELEASE ORAL at 05:15

## 2023-08-19 RX ADMIN — SODIUM CHLORIDE, PRESERVATIVE FREE 10 ML: 5 INJECTION INTRAVENOUS at 09:08

## 2023-08-19 RX ADMIN — LEVETIRACETAM 500 MG: 5 INJECTION, SOLUTION INTRAVENOUS at 18:16

## 2023-08-19 ASSESSMENT — PAIN SCALES - GENERAL
PAINLEVEL_OUTOF10: 0
PAINLEVEL_OUTOF10: 0

## 2023-08-20 VITALS
HEIGHT: 59 IN | DIASTOLIC BLOOD PRESSURE: 78 MMHG | SYSTOLIC BLOOD PRESSURE: 146 MMHG | RESPIRATION RATE: 16 BRPM | BODY MASS INDEX: 23.6 KG/M2 | OXYGEN SATURATION: 96 % | WEIGHT: 117.06 LBS | HEART RATE: 92 BPM | TEMPERATURE: 98.3 F

## 2023-08-20 LAB
ANION GAP SERPL CALCULATED.3IONS-SCNC: 13 MMOL/L (ref 9–17)
BASOPHILS # BLD: 0 K/UL (ref 0–0.2)
BASOPHILS NFR BLD: 0 % (ref 0–2)
BUN SERPL-MCNC: 21 MG/DL (ref 8–23)
CALCIUM SERPL-MCNC: 8.3 MG/DL (ref 8.6–10.4)
CHLORIDE SERPL-SCNC: 94 MMOL/L (ref 98–107)
CO2 SERPL-SCNC: 25 MMOL/L (ref 20–31)
CREAT SERPL-MCNC: 2.2 MG/DL (ref 0.5–0.9)
EOSINOPHIL # BLD: 0 K/UL (ref 0–0.44)
EOSINOPHILS RELATIVE PERCENT: 0 % (ref 1–4)
ERYTHROCYTE [DISTWIDTH] IN BLOOD BY AUTOMATED COUNT: 13.7 % (ref 11.8–14.4)
GFR SERPL CREATININE-BSD FRML MDRD: 23 ML/MIN/1.73M2
GLUCOSE SERPL-MCNC: 237 MG/DL (ref 70–99)
HCT VFR BLD AUTO: 34.5 % (ref 36.3–47.1)
HGB BLD-MCNC: 11 G/DL (ref 11.9–15.1)
IMM GRANULOCYTES # BLD AUTO: 0.18 K/UL (ref 0–0.3)
IMM GRANULOCYTES NFR BLD: 2 %
LACTIC ACID, WHOLE BLOOD: 2.7 MMOL/L (ref 0.7–2.1)
LYMPHOCYTES NFR BLD: 0.44 K/UL (ref 1.1–3.7)
LYMPHOCYTES RELATIVE PERCENT: 5 % (ref 24–43)
MCH RBC QN AUTO: 30 PG (ref 25.2–33.5)
MCHC RBC AUTO-ENTMCNC: 31.9 G/DL (ref 28.4–34.8)
MCV RBC AUTO: 94 FL (ref 82.6–102.9)
MICROORGANISM SPEC CULT: NORMAL
MONOCYTES NFR BLD: 0.35 K/UL (ref 0.1–1.2)
MONOCYTES NFR BLD: 4 % (ref 3–12)
MORPHOLOGY: NORMAL
NEUTROPHILS NFR BLD: 89 % (ref 36–65)
NEUTS SEG NFR BLD: 7.83 K/UL (ref 1.5–8.1)
NRBC BLD-RTO: 0 PER 100 WBC
PLATELET # BLD AUTO: 168 K/UL (ref 138–453)
PMV BLD AUTO: 10 FL (ref 8.1–13.5)
POTASSIUM SERPL-SCNC: 3.7 MMOL/L (ref 3.7–5.3)
RBC # BLD AUTO: 3.67 M/UL (ref 3.95–5.11)
SERVICE CMNT-IMP: NORMAL
SODIUM SERPL-SCNC: 132 MMOL/L (ref 135–144)
SPECIMEN DESCRIPTION: NORMAL
WBC OTHER # BLD: 8.8 K/UL (ref 3.5–11.3)

## 2023-08-20 PROCEDURE — 2580000003 HC RX 258

## 2023-08-20 PROCEDURE — 83605 ASSAY OF LACTIC ACID: CPT

## 2023-08-20 PROCEDURE — 6370000000 HC RX 637 (ALT 250 FOR IP)

## 2023-08-20 PROCEDURE — 99232 SBSQ HOSP IP/OBS MODERATE 35: CPT | Performed by: INTERNAL MEDICINE

## 2023-08-20 PROCEDURE — 36415 COLL VENOUS BLD VENIPUNCTURE: CPT

## 2023-08-20 PROCEDURE — 85025 COMPLETE CBC W/AUTO DIFF WBC: CPT

## 2023-08-20 PROCEDURE — 99232 SBSQ HOSP IP/OBS MODERATE 35: CPT | Performed by: PSYCHIATRY & NEUROLOGY

## 2023-08-20 PROCEDURE — 2500000003 HC RX 250 WO HCPCS

## 2023-08-20 PROCEDURE — 6360000002 HC RX W HCPCS

## 2023-08-20 PROCEDURE — 94640 AIRWAY INHALATION TREATMENT: CPT

## 2023-08-20 PROCEDURE — 6370000000 HC RX 637 (ALT 250 FOR IP): Performed by: PSYCHIATRY & NEUROLOGY

## 2023-08-20 PROCEDURE — 80048 BASIC METABOLIC PNL TOTAL CA: CPT

## 2023-08-20 RX ORDER — LEVETIRACETAM 500 MG/1
500 TABLET ORAL DAILY
Qty: 60 TABLET | Refills: 3 | Status: SHIPPED | OUTPATIENT
Start: 2023-08-21

## 2023-08-20 RX ADMIN — SODIUM CHLORIDE, PRESERVATIVE FREE 10 ML: 5 INJECTION INTRAVENOUS at 09:41

## 2023-08-20 RX ADMIN — FUROSEMIDE 80 MG: 40 TABLET ORAL at 09:37

## 2023-08-20 RX ADMIN — LEVOTHYROXINE SODIUM 25 MCG: 50 TABLET ORAL at 09:56

## 2023-08-20 RX ADMIN — CALCIUM ACETATE 1334 MG: 667 CAPSULE ORAL at 09:38

## 2023-08-20 RX ADMIN — HYDROXYZINE HYDROCHLORIDE 50 MG: 50 TABLET ORAL at 05:40

## 2023-08-20 RX ADMIN — DEXAMETHASONE SODIUM PHOSPHATE 4 MG: 4 INJECTION, SOLUTION INTRA-ARTICULAR; INTRALESIONAL; INTRAMUSCULAR; INTRAVENOUS; SOFT TISSUE at 09:41

## 2023-08-20 RX ADMIN — DEXAMETHASONE SODIUM PHOSPHATE 4 MG: 4 INJECTION, SOLUTION INTRA-ARTICULAR; INTRALESIONAL; INTRAMUSCULAR; INTRAVENOUS; SOFT TISSUE at 03:15

## 2023-08-20 RX ADMIN — ASPIRIN 81 MG 81 MG: 81 TABLET ORAL at 09:39

## 2023-08-20 RX ADMIN — FLUTICASONE PROPIONATE 1 SPRAY: 50 SPRAY, METERED NASAL at 09:42

## 2023-08-20 RX ADMIN — PANTOPRAZOLE SODIUM 40 MG: 40 TABLET, DELAYED RELEASE ORAL at 09:56

## 2023-08-20 RX ADMIN — AMIODARONE HYDROCHLORIDE 100 MG: 200 TABLET ORAL at 09:38

## 2023-08-20 RX ADMIN — LEVETIRACETAM 500 MG: 500 TABLET, FILM COATED ORAL at 09:38

## 2023-08-20 RX ADMIN — CALCIUM ACETATE 1334 MG: 667 CAPSULE ORAL at 12:28

## 2023-08-20 ASSESSMENT — ENCOUNTER SYMPTOMS
COLOR CHANGE: 0
WHEEZING: 0
CHEST TIGHTNESS: 0
NAUSEA: 0
PHOTOPHOBIA: 0
SHORTNESS OF BREATH: 0
TROUBLE SWALLOWING: 0
VOICE CHANGE: 0
VOMITING: 0

## 2023-08-20 NOTE — PLAN OF CARE
Please fill out the MRI Screening form and fax to dept @ [phone]. Any questions. .please call [facility] MRI @ [phone]. MRI exam will be scheduled after receiving the completed screening form.  Thank you!!
Problem: Discharge Planning  Goal: Discharge to home or other facility with appropriate resources  8/16/2023 1708 by Taye Avila RN  Outcome: Progressing  8/16/2023 1708 by Taye Avila RN  Outcome: Progressing  8/16/2023 1335 by Jan Kathleen RN  Outcome: Progressing  Flowsheets (Taken 8/16/2023 0300 by Crys Velez, RN)  Discharge to home or other facility with appropriate resources:   Identify barriers to discharge with patient and caregiver   Arrange for needed discharge resources and transportation as appropriate     Problem: Skin/Tissue Integrity  Goal: Absence of new skin breakdown  Description: 1. Monitor for areas of redness and/or skin breakdown  2. Assess vascular access sites hourly  3. Every 4-6 hours minimum:  Change oxygen saturation probe site  4. Every 4-6 hours:  If on nasal continuous positive airway pressure, respiratory therapy assess nares and determine need for appliance change or resting period.   8/16/2023 1708 by Taye Avila RN  Outcome: Progressing  8/16/2023 1708 by Taye Avila RN  Outcome: Progressing  8/16/2023 1335 by Jan Kathleen RN  Outcome: Progressing     Problem: Safety - Adult  Goal: Free from fall injury  8/16/2023 1708 by Taye Avila RN  Outcome: Progressing  8/16/2023 1708 by Taye Avila RN  Outcome: Progressing  8/16/2023 1335 by Jan Kathleen RN  Outcome: Progressing     Problem: Chronic Conditions and Co-morbidities  Goal: Patient's chronic conditions and co-morbidity symptoms are monitored and maintained or improved  8/16/2023 1708 by Taye Avila RN  Outcome: Progressing  8/16/2023 1708 by Taye Avila RN  Outcome: Progressing  8/16/2023 1335 by Jan Kathleen RN  Outcome: Progressing  Flowsheets (Taken 8/16/2023 0300 by Crys Velez, SHERITA)  Care Plan - Patient's Chronic Conditions and Co-Morbidity Symptoms are Monitored and Maintained or Improved: Monitor and assess patient's chronic conditions and
Problem: Discharge Planning  Goal: Discharge to home or other facility with appropriate resources  8/20/2023 1313 by Nettie Avendaño RN  Outcome: Completed  8/20/2023 1139 by Nettie Avendaño RN  Outcome: Progressing  Flowsheets (Taken 8/20/2023 1139)  Discharge to home or other facility with appropriate resources: Identify barriers to discharge with patient and caregiver  8/19/2023 2332 by Nori Regalado RN  Outcome: Progressing  Flowsheets (Taken 8/19/2023 2000)  Discharge to home or other facility with appropriate resources:   Identify barriers to discharge with patient and caregiver   Arrange for needed discharge resources and transportation as appropriate     Problem: Skin/Tissue Integrity  Goal: Absence of new skin breakdown  Description: 1. Monitor for areas of redness and/or skin breakdown  2. Assess vascular access sites hourly  3. Every 4-6 hours minimum:  Change oxygen saturation probe site  4. Every 4-6 hours:  If on nasal continuous positive airway pressure, respiratory therapy assess nares and determine need for appliance change or resting period.   8/20/2023 1313 by Nettie Avendaño RN  Outcome: Completed  8/20/2023 1139 by Nettie Avendaño RN  Outcome: Progressing  Note: Dependent areas inspected for skin breakdown   8/19/2023 2332 by Nori Regalado RN  Outcome: Progressing     Problem: Safety - Adult  Goal: Free from fall injury  8/20/2023 1313 by Nettie Avendaño RN  Outcome: Completed  8/20/2023 1139 by Nettie Avendaño RN  Outcome: Progressing  Flowsheets (Taken 8/19/2023 2000 by Nori Regalado RN)  Free From Fall Injury:   Instruct family/caregiver on patient safety   Based on caregiver fall risk screen, instruct family/caregiver to ask for assistance with transferring infant if caregiver noted to have fall risk factors  8/19/2023 2332 by Nori Regalado RN  Outcome: Progressing  Flowsheets (Taken 8/19/2023 2000)  Free From Fall Injury:   Instruct family/caregiver on patient safety   Based on caregiver
Problem: Discharge Planning  Goal: Discharge to home or other facility with appropriate resources  Outcome: Progressing     Problem: Skin/Tissue Integrity  Goal: Absence of new skin breakdown  Description: 1. Monitor for areas of redness and/or skin breakdown  2. Assess vascular access sites hourly  3. Every 4-6 hours minimum:  Change oxygen saturation probe site  4. Every 4-6 hours:  If on nasal continuous positive airway pressure, respiratory therapy assess nares and determine need for appliance change or resting period.   Outcome: Progressing     Problem: Safety - Adult  Goal: Free from fall injury  Outcome: Progressing     Problem: Chronic Conditions and Co-morbidities  Goal: Patient's chronic conditions and co-morbidity symptoms are monitored and maintained or improved  Outcome: Progressing
Problem: Discharge Planning  Goal: Discharge to home or other facility with appropriate resources  Outcome: Progressing  Flowsheets (Taken 8/16/2023 0300 by Krishan Winn RN)  Discharge to home or other facility with appropriate resources:   Identify barriers to discharge with patient and caregiver   Arrange for needed discharge resources and transportation as appropriate     Problem: Skin/Tissue Integrity  Goal: Absence of new skin breakdown  Description: 1. Monitor for areas of redness and/or skin breakdown  2. Assess vascular access sites hourly  3. Every 4-6 hours minimum:  Change oxygen saturation probe site  4. Every 4-6 hours:  If on nasal continuous positive airway pressure, respiratory therapy assess nares and determine need for appliance change or resting period.   Outcome: Progressing     Problem: Safety - Adult  Goal: Free from fall injury  Outcome: Progressing     Problem: Chronic Conditions and Co-morbidities  Goal: Patient's chronic conditions and co-morbidity symptoms are monitored and maintained or improved  Outcome: Progressing  Flowsheets (Taken 8/16/2023 0300 by Krishan Winn RN)  Care Plan - Patient's Chronic Conditions and Co-Morbidity Symptoms are Monitored and Maintained or Improved: Monitor and assess patient's chronic conditions and comorbid symptoms for stability, deterioration, or improvement
Problem: Discharge Planning  Goal: Discharge to home or other facility with appropriate resources  Outcome: Progressing  Flowsheets (Taken 8/19/2023 2000)  Discharge to home or other facility with appropriate resources:   Identify barriers to discharge with patient and caregiver   Arrange for needed discharge resources and transportation as appropriate     Problem: Skin/Tissue Integrity  Goal: Absence of new skin breakdown  Description: 1. Monitor for areas of redness and/or skin breakdown  2. Assess vascular access sites hourly  3. Every 4-6 hours minimum:  Change oxygen saturation probe site  4. Every 4-6 hours:  If on nasal continuous positive airway pressure, respiratory therapy assess nares and determine need for appliance change or resting period.   Outcome: Progressing     Problem: Safety - Adult  Goal: Free from fall injury  Outcome: Progressing  Flowsheets (Taken 8/19/2023 2000)  Free From Fall Injury:   Instruct family/caregiver on patient safety   Based on caregiver fall risk screen, instruct family/caregiver to ask for assistance with transferring infant if caregiver noted to have fall risk factors     Problem: Chronic Conditions and Co-morbidities  Goal: Patient's chronic conditions and co-morbidity symptoms are monitored and maintained or improved  Outcome: Progressing  Flowsheets (Taken 8/19/2023 2000)  Care Plan - Patient's Chronic Conditions and Co-Morbidity Symptoms are Monitored and Maintained or Improved: Monitor and assess patient's chronic conditions and comorbid symptoms for stability, deterioration, or improvement     Problem: Respiratory - Adult  Goal: Achieves optimal ventilation and oxygenation  8/19/2023 2332 by Henri Fleischer, RN  Outcome: Progressing  8/19/2023 2124 by Sky Adorno RCP  Outcome: Progressing  Flowsheets (Taken 8/19/2023 2000 by Henri Fleischer, RN)  Achieves optimal ventilation and oxygenation:   Assess for changes in respiratory status   Assess for changes in mentation
Problem: Respiratory - Adult  Goal: Achieves optimal ventilation and oxygenation  Outcome: Progressing   BRONCHOSPASM/BRONCHOCONSTRICTION     [x]         IMPROVE AERATION/BREATH SOUNDS  [x]   ADMINISTER BRONCHODILATOR THERAPY AS APPROPRIATE  [x]   ASSESS BREATH SOUNDS  []   IMPLEMENT AEROSOL/MDI PROTOCOL  [x]   PATIENT EDUCATION AS NEEDED   PROVIDE ADEQUATE OXYGENATION WITH ACCEPTABLE SP02/ABG'S    [x]  IDENTIFY APPROPRIATE OXYGEN THERAPY  [x]   MONITOR SP02/ABG'S AS NEEDED   [x]   PATIENT EDUCATION AS NEEDED
Problem: Respiratory - Adult  Goal: Achieves optimal ventilation and oxygenation  Outcome: Progressing   BRONCHOSPASM/BRONCHOCONSTRICTION     [x]         IMPROVE AERATION/BREATH SOUNDS  [x]   ADMINISTER BRONCHODILATOR THERAPY AS APPROPRIATE  [x]   ASSESS BREATH SOUNDS  []   IMPLEMENT AEROSOL/MDI PROTOCOL  [x]   PATIENT EDUCATION AS NEEDED   PROVIDE ADEQUATE OXYGENATION WITH ACCEPTABLE SP02/ABG'S    [x]  IDENTIFY APPROPRIATE OXYGEN THERAPY  [x]   MONITOR SP02/ABG'S AS NEEDED   [x]   PATIENT EDUCATION AS NEEDED
Co-morbidities  Goal: Patient's chronic conditions and co-morbidity symptoms are monitored and maintained or improved  8/20/2023 1139 by Estevan Montgomery RN  Outcome: Progressing  Flowsheets (Taken 8/20/2023 1139)  Care Plan - Patient's Chronic Conditions and Co-Morbidity Symptoms are Monitored and Maintained or Improved: Monitor and assess patient's chronic conditions and comorbid symptoms for stability, deterioration, or improvement  8/19/2023 2332 by Alan Aldridge RN  Outcome: Progressing  Flowsheets (Taken 8/19/2023 2000)  Care Plan - Patient's Chronic Conditions and Co-Morbidity Symptoms are Monitored and Maintained or Improved: Monitor and assess patient's chronic conditions and comorbid symptoms for stability, deterioration, or improvement     Problem: Respiratory - Adult  Goal: Achieves optimal ventilation and oxygenation  8/19/2023 2332 by Alan Aldridge RN  Outcome: Progressing

## 2023-08-20 NOTE — CARE COORDINATION
Transitional Planning  Attempted to see patient, several Mds at bedside. 1200 pm Spoke with son and patient at bedside, plan to return home. Declining SNF and HHC. Has transportation. Agreeable to plan. Discharge [address] Case Management Department  Written by: Ainsley Park RN    Patient Name: Darius Sims  Attending Provider: Alayna Iraheta MD  Admit Date: 2023  1:58 AM  MRN: [id number]  Account: [de-identified]                     : 1950  Discharge Date:       Disposition: home    Ainsley Park RN    300 pm received call from dtr Leonora Trivedi stating she feels as if her mother got discharged too early. Patient now is confused, trying to hit and bite people, stating she is not at home, people are pushing her down the stairs, and she does not feel she can handle her in this state. Explained to dtr, since patient is dc'd to follow up with PCP, but if patient had a significant mental change, might want to consider taking her back to ER. Discuss when CM was in the room with son,  patient was alert and oriented, clear conversations, no confusion, anxiety, mood change noted.

## 2023-08-20 NOTE — DISCHARGE SUMMARY
Temp: 98.3f  HR: 92  RR: 16  /78  SPO2 96 on RA  Denies Pain    AVS and medications reviewed with patient and children, Yousuf Kilpatrick and Nain Gentile. Out patient pharmacy called and scripts being sent to [facility] on [address] per family's preferences. Follow up appointments and blood culture orders reviewed. Neither pt nor children had any questions at that time. Pt was already dressed in home clothing. IV removed and dressing applied. Pt transferred to wheelchair and wheeled to lobby by aide with children.
needed for Pain or Fever      Elastic Bandages & Supports (KNEE BRACE/FLEX STAYS MEDIUM) MISC as directed      Blood Pressure KIT Diagnosis: HTN. Needs to check blood pressure 1-2 times a day until stable, then once a day. Goal blood pressure less than 135/85, and above 110/60. Qty: 1 kit, Refills: 0    Associated Diagnoses: Chronic diastolic CHF (congestive heart failure) ([address]); Paroxysmal atrial fibrillation ([address]); Benign hypertension with CKD (chronic kidney disease) stage III (HCC)      Nebulizers (COMPRESSOR/NEBULIZER) MISC Nebulizer with compressor. Disposable Med Nebs 2 /month. Reusable Med Nebs 1 per 6 months. Aerosol Mask 1 per month. Replacement Filters 2 per month. All other related supplies as needed per month. Medications being used: Albuterol . 083 unit dose vial. Frequency: every 6 hrs x 4/day . Length of Need: 1  Qty: 1 each, Refills: 3    Associated Diagnoses: Chronic obstructive pulmonary disease, unspecified COPD type ([address])      Handicap Placard MISC by Does not apply route Expires on 12/30/25  Qty: 1 each, Refills: 0    Associated Diagnoses: Type 2 diabetes mellitus with stage 3a chronic kidney disease, without long-term current use of insulin ([address]); Acute on chronic diastolic CHF (congestive heart failure) ([address]); Chronic obstructive pulmonary disease, unspecified COPD type ([address]);  Risk for falls           STOP taking these medications       amLODIPine (NORVASC) 10 MG tablet Comments:   Reason for Stopping:               Activity: {discharge activity:41793}    Restrictions: Driving {YES / OU:36328}, Swimming {YES / YY:95501}, Operating heavy machinery {YES / AK:22862}, Compromising heights {YES / OU:19127}    Diet: {diet:08549}    Follow-up:    Michael Kelley DO  [address]  MOB # 2 [address]  [phone]    Schedule an appointment as soon as possible for a visit in 6 week(s)  Please follow up with neurosurgery in 6-8 weeks    Melvin Barber, [address]

## 2023-08-21 LAB
MICROORGANISM SPEC CULT: NORMAL
SERVICE CMNT-IMP: NORMAL
SPECIMEN DESCRIPTION: NORMAL

## 2023-08-23 ENCOUNTER — TELEPHONE (OUTPATIENT)
Dept: FAMILY MEDICINE CLINIC | Age: 73
End: 2023-08-23

## 2023-08-23 ENCOUNTER — TELEPHONE (OUTPATIENT)
Dept: NEUROSURGERY | Age: 73
End: 2023-08-23

## 2023-08-23 ENCOUNTER — TELEMEDICINE (OUTPATIENT)
Dept: FAMILY MEDICINE CLINIC | Age: 73
End: 2023-08-23
Payer: MEDICARE

## 2023-08-23 DIAGNOSIS — G93.41 METABOLIC ENCEPHALOPATHY: Primary | ICD-10-CM

## 2023-08-23 DIAGNOSIS — J96.11 CHRONIC RESPIRATORY FAILURE WITH HYPOXIA (HCC): ICD-10-CM

## 2023-08-23 DIAGNOSIS — I48.0 PAROXYSMAL ATRIAL FIBRILLATION (HCC): ICD-10-CM

## 2023-08-23 DIAGNOSIS — R78.81 BACTEREMIA: ICD-10-CM

## 2023-08-23 DIAGNOSIS — Z09 HOSPITAL DISCHARGE FOLLOW-UP: ICD-10-CM

## 2023-08-23 DIAGNOSIS — D32.9 MENINGIOMA (HCC): ICD-10-CM

## 2023-08-23 DIAGNOSIS — J44.9 CHRONIC OBSTRUCTIVE PULMONARY DISEASE, UNSPECIFIED COPD TYPE (HCC): ICD-10-CM

## 2023-08-23 DIAGNOSIS — F41.9 ANXIETY: ICD-10-CM

## 2023-08-23 DIAGNOSIS — I13.2 HYPERTENSIVE HEART AND CHRONIC KIDNEY DISEASE WITH HEART FAILURE AND WITH STAGE 5 CHRONIC KIDNEY DISEASE, OR END STAGE RENAL DISEASE (HCC): ICD-10-CM

## 2023-08-23 DIAGNOSIS — F01.B4 MODERATE VASCULAR DEMENTIA WITH ANXIETY (HCC): ICD-10-CM

## 2023-08-23 DIAGNOSIS — A42.9 ACTINOMYCES INFECTION: ICD-10-CM

## 2023-08-23 DIAGNOSIS — I50.42 CHRONIC COMBINED SYSTOLIC AND DIASTOLIC CHF (CONGESTIVE HEART FAILURE) (HCC): ICD-10-CM

## 2023-08-23 DIAGNOSIS — G40.89 OTHER SEIZURES (HCC): ICD-10-CM

## 2023-08-23 LAB
MICROORGANISM SPEC CULT: NORMAL
MICROORGANISM SPEC CULT: NORMAL
SERVICE CMNT-IMP: NORMAL
SERVICE CMNT-IMP: NORMAL
SPECIMEN DESCRIPTION: NORMAL
SPECIMEN DESCRIPTION: NORMAL

## 2023-08-23 PROCEDURE — 3017F COLORECTAL CA SCREEN DOC REV: CPT | Performed by: FAMILY MEDICINE

## 2023-08-23 PROCEDURE — G8427 DOCREV CUR MEDS BY ELIG CLIN: HCPCS | Performed by: FAMILY MEDICINE

## 2023-08-23 PROCEDURE — 1090F PRES/ABSN URINE INCON ASSESS: CPT | Performed by: FAMILY MEDICINE

## 2023-08-23 PROCEDURE — 1111F DSCHRG MED/CURRENT MED MERGE: CPT | Performed by: FAMILY MEDICINE

## 2023-08-23 PROCEDURE — 1123F ACP DISCUSS/DSCN MKR DOCD: CPT | Performed by: FAMILY MEDICINE

## 2023-08-23 PROCEDURE — 99215 OFFICE O/P EST HI 40 MIN: CPT | Performed by: FAMILY MEDICINE

## 2023-08-23 PROCEDURE — G8399 PT W/DXA RESULTS DOCUMENT: HCPCS | Performed by: FAMILY MEDICINE

## 2023-08-23 RX ORDER — MIRTAZAPINE 7.5 MG/1
7.5 TABLET, FILM COATED ORAL NIGHTLY
Qty: 30 TABLET | Refills: 0 | Status: SHIPPED | OUTPATIENT
Start: 2023-08-23

## 2023-08-23 ASSESSMENT — ENCOUNTER SYMPTOMS
ABDOMINAL PAIN: 0
WHEEZING: 0
NAUSEA: 0
VOMITING: 0
DIARRHEA: 0
ABDOMINAL DISTENTION: 0
BLOOD IN STOOL: 0
CHEST TIGHTNESS: 0
COUGH: 0
CONSTIPATION: 0

## 2023-08-23 NOTE — PROGRESS NOTES
omeprazole (PRILOSEC) 20 MG delayed release capsule TAKE 1 CAPSULE BY MOUTH ONCE DAILY IN THE MORNING BEFORE BREAKFAST  Qty: 90 capsule, Refills: 3     Associated Diagnoses: Gastroesophageal reflux disease, unspecified whether esophagitis present       acetaminophen (TYLENOL) 500 MG tablet Take 1 tablet by mouth every 6 hours as needed for Pain or Fever       Elastic Bandages & Supports (KNEE BRACE/FLEX STAYS MEDIUM) MISC as directed       Blood Pressure KIT Diagnosis: HTN. Needs to check blood pressure 1-2 times a day until stable, then once a day. Goal blood pressure less than 135/85, and above 110/60. Qty: 1 kit, Refills: 0     Associated Diagnoses: Chronic diastolic CHF (congestive heart failure) ([address]); Paroxysmal atrial fibrillation ([address]); Benign hypertension with CKD (chronic kidney disease) stage III ([facility])       Nebulizers (COMPRESSOR/NEBULIZER) MISC Nebulizer with compressor. Disposable Med Nebs 2 /month. Reusable Med Nebs 1 per 6 months. Aerosol Mask 1 per month. Replacement Filters 2 per month. All other related supplies as needed per month. Medications being used: Albuterol . 083 unit dose vial. Frequency: every 6 hrs x 4/day . Length of Need: 1  Qty: 1 each, Refills: 3     Associated Diagnoses: Chronic obstructive pulmonary disease, unspecified COPD type ([address])       Handicap Placard MISC by Does not apply route Expires on 12/30/25  Qty: 1 each, Refills: 0     Associated Diagnoses: Type 2 diabetes mellitus with stage 3a chronic kidney disease, without long-term current use of insulin ([address]); Acute on chronic diastolic CHF (congestive heart failure) ([address]); Chronic obstructive pulmonary disease, unspecified COPD type ([address]);  Risk for falls                   STOP taking these medications         amLODIPine (NORVASC) 10 MG tablet Comments:   Reason for Stopping:                   Medications marked \"taking\" at this time  Outpatient Medications Marked as Taking for the 8/23/23 encounter (Telemedicine) with

## 2023-08-23 NOTE — TELEPHONE ENCOUNTER
[facility] Transitions Initial Follow Up Call    Outreach made within 2 business days of discharge: Yes    Patient: Beti Aragon Patient : 1950   MRN: [id number]  Reason for Admission: There are no discharge diagnoses documented for the most recent discharge. Discharge Date: 23       Spoke with: [facility]    If Virtual visit made for hospital follow up, advise patient to make sure to have family member present to help assist with visit. Please make sure mobile number is updated in patient chart. Discharge department/facility: [facility] Interactive Patient Contact:  Was patient able to fill all prescriptions:   Was patient instructed to bring all medications to the follow-up visit:   Is patient taking all medications as directed in the discharge summary?    Does patient understand their discharge instructions:   Does patient have questions or concerns that need addressed prior to 7-14 day follow up office visit:     Scheduled appointment with PCP within 7-14 days    Follow Up  Future Appointments   Date Time Provider [address] [place] [address]   2023  1:00 PM MD mary Hawley sc [facility]   2023  3:15 PM Jann Duong, DO [facility]   2023  1:00 PM Anderson Roque, DO Neuro [facility] -   2023  2:45 PM Carolina Iraheta PA-C  derm [facility]   10/11/2023 11:00 AM [place], DO [facility]   2023  3:30 PM MD mary Hawley [facility]   2023  3:30 PM [place], DO [facility]       Andrew Pierre MA

## 2023-08-23 NOTE — TELEPHONE ENCOUNTER
Neurosurgery not managing antibiotic regimen, this is per Infectious Disease. Please provide contact information/forward concerns to Dr Sherrie Solorzano office please.

## 2023-08-23 NOTE — TELEPHONE ENCOUNTER
Patient recently discharged from the hospital, had Actinomyces  bacteremia and UTI E. Coli    I just evaluated the patient by video  I reviewed your last note    Gerda Doe , her daughter says that she was supposed to get an antibiotic for 4 weeks, and not sure which one, please let me know and I can prescribe it right away--uses [facility] for short term RX    I did give her instructions regarding the transthoracic echo to check for vegetations, I tried to order it, I was unable to insurance will not cover, as too close to the other one. She does have appointment today with cardiologist, I advised to discuss with them    I also advised about the blood cultures which need to be drawn during the hemodialysis on 8/29/2023 per your note    I did try to discuss hospice or palliative care or at least home care, patient and her daughter declined      Thank you!     Future Appointments   Date Time Provider [address]   8/23/2023  3:15 PM Jann Duong, DO [facility]   9/6/2023  1:00 PM Yolanda Leon, DO Neuro [facility] -   9/11/2023  2:45 PM Gayle Segundo PA-C  derm [facility]   10/11/2023 11:00 AM Bebeto Scott, DO [facility]   12/1/2023  3:30 PM Ana Pool MD [facility]   12/11/2023  3:30 PM Liane Chen, DO [facility]

## 2023-08-23 NOTE — TELEPHONE ENCOUNTER
Patient's daughter is calling to report that the patient has not received any medication/ antibiotics to treat her Bacteremia. She states that she was told that the patient will be on a 4-week antibiotic prior to her upcoming visit.

## 2023-08-23 NOTE — TELEPHONE ENCOUNTER
Patient daughter informed, she wrote instructions  Echo2d per cardio         RE:  Received: Today  Tamela Cabot, MD Evangelina Reiter, MD  Caller: Unspecified (Today,  1:44 PM)  Patient had a single blood culture with actinomyces. We do not know if it is significant. It could be a contaminant. She completed antibiotics for uti in hospital. She was asked to obtain blood cultures x 2 spaced 20 min apart on 8-29-23 to see if initial bacteremia could be confirmed . She was also asked to get a cardiac echo to check the heart valves . If the bacteremia is significant it could produce endocarditis . Please make sure she gets the tests . She does not need antibiotics now.

## 2023-08-25 ENCOUNTER — TELEPHONE (OUTPATIENT)
Dept: FAMILY MEDICINE CLINIC | Age: 73
End: 2023-08-25

## 2023-08-25 ASSESSMENT — ENCOUNTER SYMPTOMS: SHORTNESS OF BREATH: 1

## 2023-08-25 NOTE — TELEPHONE ENCOUNTER
Pt was given new medication for depression, will she still be able to take the anxiety medication on days she has dialysis? Started on Flonase, wants to know if this is a long term or short term script? Daughter is also inquiring if provider knows how long her aggressiveness will last with the delirium.

## 2023-08-25 NOTE — TELEPHONE ENCOUNTER
Dr Viola Gupta,  Just letting you know patient was recently readmitted with sepsis, bacteremia, and infectious diseases requesting blood cultures      Patient will need 2 blood cultures spaced 20 minutes apart on 8/29/2023    \"Received: Today  MD Maya Ortiz MD  Caller: Unspecified (Today,  1:44 PM)  Patient had a single blood culture with actinomyces. We do not know if it is significant. It could be a contaminant. She completed antibiotics for uti in hospital. She was asked to obtain blood cultures x 2 spaced 20 min apart on 8-29-23 to see if initial bacteremia could be confirmed . She was also asked to get a cardiac echo to check the heart valves . If the bacteremia is significant it could produce endocarditis . Please make sure she gets the tests . She does not need antibiotics now. Also, letting you know I tried to discuss with patient and her daughter hospice and palliative care, they both declined    Thank you!

## 2023-08-25 NOTE — TELEPHONE ENCOUNTER
SPOKE [address] who says her mom has been \"More aggressive and and asking for different people who are dead , like her parents\"  Her behavior is totally different than before. I discussed with the daughter as she called our office today regarding her   \"aggressiveness\"    I spoke to the daughter to hold off Keppra, if symptoms get better, then Keppra is the culprit  If symptoms do not get better to go to the emergency room  If any seizures occurs due to brain tumor/meningioma, to take her to ER or call 46  Daughter says she did not have any seizure ever, I explained to her EEG showed possible risk of seizures and with the brain tumor she is at a high risk of seizures, that is why this medication was started in the hospital.    If symptoms get better, to closely monitor for seizures, and discuss with neurologist at the appointment on 9/6/2023 for another antiseizure medication    Daughter verbalized understanding, I also sent her MyChart message, she checks MyChart, patient lives with her son, but daughter has been there every day she says.     Future Appointments   Date Time Provider [address]   8/31/2023  3:45 PM Titi Sue MD INFT DISEASE [facility]   9/6/2023  1:00 PM Berkley Ty DO Neuro [facility] -   9/11/2023  2:45 PM Hank Cisneros PA-C  derm [facility]   10/11/2023 11:00 AM Jud Darden DO [facility]   12/1/2023  3:30 PM Britany Marcial MD [facility]   12/11/2023  3:30 PM Bebeto Vasquez DO [facility]     Fyi  Nervous system: Aggressive behavior (children and adolescents: 10%; adults: 1%) (table 8), agitation (children and adolescents: 4%) (table 9), amnesia (adults: 2%), anxiety (2%) (table 10), ataxia (adult partial-onset seizures: 3%; includes abnormal gait, incoordination) (table 11), confusion (children and adolescents: 2% to 3%) (table 12), depression (3% to 5%) (table 13), dizziness (5% to 9%) (table 14), emotional lability (2% to 5%) (table 15), falling

## 2023-08-29 ENCOUNTER — TELEPHONE (OUTPATIENT)
Dept: FAMILY MEDICINE CLINIC | Age: 73
End: 2023-08-29

## 2023-08-29 NOTE — TELEPHONE ENCOUNTER
Please let the daughter know she will need clearance from nephrologist and cardiologist and  neurologist for general anesthesia for cataract surgery    Then I will clear her    Future Appointments   Date Time Provider [address]   8/31/2023  3:45 PM Ivan Donnelly MD INFT DISEASE [facility]   9/6/2023  1:00 PM Reymundo Gonsales DO Neuro [facility] Neurology -   9/11/2023  2:45 PM Robert Aguilar PA-C  derm [facility]   10/11/2023 11:00 AM Bebeto Lawton DO Benny Neuro [facility]   12/1/2023  3:30 PM Kaylee Butler MD [facility]   12/11/2023  3:30 PM Simba Adkins DO Benny Neuro [facility]

## 2023-08-29 NOTE — TELEPHONE ENCOUNTER
Incoming fax came from [employer] [facility] ,    [employer] office will like a medical clearance from Provider. Upcoming surgery/Procedure: Will need medical clearance prior to scheduled surgery/Procedure . Please see attachment below. Please advise. Thank you!     Future Appointments   Date Time Provider [address]   8/31/2023  3:45 PM Junie Bar MD INFT DISEASE [facility]   9/6/2023  1:00 PM José Miguel Whiteside, DO Neuro [facility] Neurology -   9/11/2023  2:45 PM Nate Perez PA-C m[facility] derm [facility]   10/11/2023 11:00 AM Bebeto Murillo, DO Benny Neuro [facility]   12/1/2023  3:30 PM Kacey Duncan MD  sc [facility]   12/11/2023  3:30 PM Katiana Moreira, DO Benny Neuro [facility]

## 2023-08-31 ENCOUNTER — OFFICE VISIT (OUTPATIENT)
Dept: INFECTIOUS DISEASES | Age: 73
End: 2023-08-31
Payer: MEDICARE

## 2023-08-31 VITALS
BODY MASS INDEX: 14.92 KG/M2 | RESPIRATION RATE: 16 BRPM | SYSTOLIC BLOOD PRESSURE: 124 MMHG | HEART RATE: 104 BPM | TEMPERATURE: 97.5 F | WEIGHT: 74 LBS | DIASTOLIC BLOOD PRESSURE: 67 MMHG | HEIGHT: 59 IN

## 2023-08-31 DIAGNOSIS — J44.9 CHRONIC OBSTRUCTIVE PULMONARY DISEASE, UNSPECIFIED COPD TYPE (HCC): ICD-10-CM

## 2023-08-31 DIAGNOSIS — N18.6 ESRD ON DIALYSIS (HCC): ICD-10-CM

## 2023-08-31 DIAGNOSIS — I73.9 PVD (PERIPHERAL VASCULAR DISEASE) (HCC): ICD-10-CM

## 2023-08-31 DIAGNOSIS — R78.81 BACTEREMIA: ICD-10-CM

## 2023-08-31 DIAGNOSIS — A42.9 ACTINOMYCES INFECTION: ICD-10-CM

## 2023-08-31 DIAGNOSIS — E11.42 TYPE 2 DIABETES MELLITUS WITH POLYNEUROPATHY (HCC): Primary | ICD-10-CM

## 2023-08-31 DIAGNOSIS — Z99.2 ESRD ON DIALYSIS (HCC): ICD-10-CM

## 2023-08-31 DIAGNOSIS — G93.89 RIGHT TEMPORAL LOBE MASS: ICD-10-CM

## 2023-08-31 PROCEDURE — 1123F ACP DISCUSS/DSCN MKR DOCD: CPT | Performed by: INTERNAL MEDICINE

## 2023-08-31 PROCEDURE — 3074F SYST BP LT 130 MM HG: CPT | Performed by: INTERNAL MEDICINE

## 2023-08-31 PROCEDURE — 3017F COLORECTAL CA SCREEN DOC REV: CPT | Performed by: INTERNAL MEDICINE

## 2023-08-31 PROCEDURE — G8427 DOCREV CUR MEDS BY ELIG CLIN: HCPCS | Performed by: INTERNAL MEDICINE

## 2023-08-31 PROCEDURE — 1090F PRES/ABSN URINE INCON ASSESS: CPT | Performed by: INTERNAL MEDICINE

## 2023-08-31 PROCEDURE — 3023F SPIROM DOC REV: CPT | Performed by: INTERNAL MEDICINE

## 2023-08-31 PROCEDURE — G8418 CALC BMI BLW LOW PARAM F/U: HCPCS | Performed by: INTERNAL MEDICINE

## 2023-08-31 PROCEDURE — 99213 OFFICE O/P EST LOW 20 MIN: CPT | Performed by: INTERNAL MEDICINE

## 2023-08-31 PROCEDURE — 3078F DIAST BP <80 MM HG: CPT | Performed by: INTERNAL MEDICINE

## 2023-08-31 PROCEDURE — 3044F HG A1C LEVEL LT 7.0%: CPT | Performed by: INTERNAL MEDICINE

## 2023-08-31 PROCEDURE — 1036F TOBACCO NON-USER: CPT | Performed by: INTERNAL MEDICINE

## 2023-08-31 PROCEDURE — 2022F DILAT RTA XM EVC RTNOPTHY: CPT | Performed by: INTERNAL MEDICINE

## 2023-08-31 PROCEDURE — 1111F DSCHRG MED/CURRENT MED MERGE: CPT | Performed by: INTERNAL MEDICINE

## 2023-08-31 PROCEDURE — G8399 PT W/DXA RESULTS DOCUMENT: HCPCS | Performed by: INTERNAL MEDICINE

## 2023-09-06 ENCOUNTER — OFFICE VISIT (OUTPATIENT)
Dept: NEUROLOGY | Age: 73
End: 2023-09-06
Payer: MEDICARE

## 2023-09-06 VITALS
HEIGHT: 59 IN | OXYGEN SATURATION: 94 % | SYSTOLIC BLOOD PRESSURE: 120 MMHG | HEART RATE: 60 BPM | BODY MASS INDEX: 14.92 KG/M2 | TEMPERATURE: 97.8 F | WEIGHT: 74 LBS | DIASTOLIC BLOOD PRESSURE: 76 MMHG

## 2023-09-06 DIAGNOSIS — R56.9 SEIZURE-LIKE ACTIVITY (HCC): Primary | ICD-10-CM

## 2023-09-06 PROCEDURE — 1090F PRES/ABSN URINE INCON ASSESS: CPT

## 2023-09-06 PROCEDURE — 1036F TOBACCO NON-USER: CPT

## 2023-09-06 PROCEDURE — G8399 PT W/DXA RESULTS DOCUMENT: HCPCS

## 2023-09-06 PROCEDURE — 3078F DIAST BP <80 MM HG: CPT

## 2023-09-06 PROCEDURE — 3017F COLORECTAL CA SCREEN DOC REV: CPT

## 2023-09-06 PROCEDURE — 99214 OFFICE O/P EST MOD 30 MIN: CPT

## 2023-09-06 PROCEDURE — 3074F SYST BP LT 130 MM HG: CPT

## 2023-09-06 PROCEDURE — 1123F ACP DISCUSS/DSCN MKR DOCD: CPT

## 2023-09-06 PROCEDURE — G8418 CALC BMI BLW LOW PARAM F/U: HCPCS

## 2023-09-06 PROCEDURE — G8427 DOCREV CUR MEDS BY ELIG CLIN: HCPCS

## 2023-09-06 PROCEDURE — 1111F DSCHRG MED/CURRENT MED MERGE: CPT

## 2023-09-06 RX ORDER — LAMOTRIGINE 25 MG/1
25 TABLET ORAL DAILY
Qty: 7 TABLET | Refills: 0 | Status: SHIPPED | OUTPATIENT
Start: 2023-09-06 | End: 2023-09-13

## 2023-09-06 RX ORDER — LAMOTRIGINE 25 MG/1
25 TABLET ORAL 2 TIMES DAILY
Qty: 30 TABLET | Refills: 3 | Status: SHIPPED | OUTPATIENT
Start: 2023-09-14

## 2023-09-06 ASSESSMENT — ENCOUNTER SYMPTOMS
CHEST TIGHTNESS: 0
WHEEZING: 0
TROUBLE SWALLOWING: 0
PHOTOPHOBIA: 0
VOMITING: 0
VOICE CHANGE: 0
SHORTNESS OF BREATH: 0
NAUSEA: 0
COLOR CHANGE: 0

## 2023-09-06 NOTE — PATIENT INSTRUCTIONS
Please start taking Lamictal as follows. 1st week: 25 mg daily for 7 days. 2nd week: 25 mg in morning and 25 mg at night. Continue on 25 mg twice daily until next follow up. Please monitor for skin rashes that may occur with Lamictal, and stop the medication if a rash is seen. Please call or message the clinic if this occurs, and we will address it appropriately.

## 2023-09-06 NOTE — PROGRESS NOTES
comfortably in wheel chair. Head:  normocephalic, atraumatic. Neck: supple, no carotid bruits, thyroid not palpable  Respiratory: Clear to auscultation bilaterally with no use of accessory muscles during respiration. Cardiovascular: normal rate, regular rhythm, no murmur, gallop, rub. Abdomen: Soft, nontender, nondistended, normal bowel sounds,    Extremities:  peripheral pulses palpable, no pedal edema or calf pain with palpation  Psych: normal affect      NEUROLOGICAL EXAMINATION:     Mental status   Alert and oriented; intact memory with no confusion, speech or language problems; no hallucinations or delusions     Cranial nerves   II - Bilateral decrease in visual acuity, R > L.                                                  III, IV, VI - extra-ocular muscles full: no pupillary defect; no SIERRA, no nystagmus, no ptosis   V - normal facial sensation                                                               VII - normal facial symmetry                                                             VIII - intact hearing                                                                             IX, X - symmetrical palate                                                                  XI - symmetrical shoulder shrug                                                       XII - midline tongue without atrophy or fasciculation     Motor function  Normal muscle bulk and tone  Muscle strength: normal power 5/5  fine motor movements     Sensory function Intact to touch, pin prick, vibration, proprioception in bilateral upper and lower extremities. Cerebellar Intact fine motor movement. No involuntary movements or tremors     Reflex function Intact 2+ DTR and symmetric. Negative Babinski     Gait                  Deferred.             PRIOR TESTS AND IMAGING: Following images and Labs were reviewed by the examiner             ASSESSMENT       Episode of altered mental status in the setting of right temporal

## 2023-09-11 ENCOUNTER — OFFICE VISIT (OUTPATIENT)
Dept: DERMATOLOGY | Age: 73
End: 2023-09-11

## 2023-09-11 VITALS
BODY MASS INDEX: 24.6 KG/M2 | HEART RATE: 81 BPM | HEIGHT: 59 IN | SYSTOLIC BLOOD PRESSURE: 99 MMHG | DIASTOLIC BLOOD PRESSURE: 58 MMHG | WEIGHT: 122 LBS | OXYGEN SATURATION: 96 %

## 2023-09-11 DIAGNOSIS — D48.9 NEOPLASM OF UNCERTAIN BEHAVIOR: Primary | ICD-10-CM

## 2023-09-11 RX ORDER — LIDOCAINE HYDROCHLORIDE AND EPINEPHRINE 10; 10 MG/ML; UG/ML
0.5 INJECTION, SOLUTION INFILTRATION; PERINEURAL ONCE
Status: SHIPPED | OUTPATIENT
Start: 2023-09-11

## 2023-09-14 DIAGNOSIS — F01.B4 MODERATE VASCULAR DEMENTIA WITH ANXIETY (HCC): ICD-10-CM

## 2023-09-14 DIAGNOSIS — F41.9 ANXIETY: ICD-10-CM

## 2023-09-14 RX ORDER — MIRTAZAPINE 7.5 MG/1
7.5 TABLET, FILM COATED ORAL NIGHTLY
Qty: 90 TABLET | Refills: 0 | Status: SHIPPED | OUTPATIENT
Start: 2023-09-14

## 2023-09-14 NOTE — TELEPHONE ENCOUNTER
Please Approve or Refuse. Send to Pharmacy per Pt's Request:     PATIENTS DAUGHTER IS STATING MOTHER HAS BEEN VERY CONFUSED/STATED BRING UP PEOPLE THAT HAVE PASSED AWAY.      FYI: NEUROLOGIST KEPPRA CHANGED TO LAMOTRIGINE 25 MG 1 TABLET BID      Next Visit Date:  12/1/2023   Last Visit Date: 8/23/2023    Hemoglobin A1C (%)   Date Value   05/10/2023 5.7   11/11/2022 6.4   03/31/2022 4.6             ( goal A1C is < 7)   BP Readings from Last 3 Encounters:   09/11/23 (!) 99/58   09/06/23 120/76   08/31/23 124/67          (goal 120/80)  BUN   Date Value Ref Range Status   08/20/2023 21 8 - 23 mg/dL Final     Creatinine   Date Value Ref Range Status   08/20/2023 2.2 (H) 0.5 - 0.9 mg/dL Final     Potassium   Date Value Ref Range Status   08/20/2023 3.7 3.7 - 5.3 mmol/L Final

## 2023-09-19 DIAGNOSIS — F41.9 ANXIETY: ICD-10-CM

## 2023-09-19 DIAGNOSIS — F01.B4 MODERATE VASCULAR DEMENTIA WITH ANXIETY (HCC): ICD-10-CM

## 2023-09-19 RX ORDER — MIRTAZAPINE 7.5 MG/1
7.5 TABLET, FILM COATED ORAL NIGHTLY
Qty: 90 TABLET | Refills: 0 | Status: SHIPPED | OUTPATIENT
Start: 2023-09-19

## 2023-09-19 NOTE — TELEPHONE ENCOUNTER
Please Approve or Refuse.   Send to Pharmacy per Pt's Request: Brenden Fernandez she will like the same medication sent over to mail service med by Michelle John     Next Visit Date:  12/1/2023   Last Visit Date: 8/23/2023    Hemoglobin A1C (%)   Date Value   05/10/2023 5.7   11/11/2022 6.4   03/31/2022 4.6             ( goal A1C is < 7)   BP Readings from Last 3 Encounters:   09/11/23 (!) 99/58   09/06/23 120/76   08/31/23 124/67          (goal 120/80)  BUN   Date Value Ref Range Status   08/20/2023 21 8 - 23 mg/dL Final     Creatinine   Date Value Ref Range Status   08/20/2023 2.2 (H) 0.5 - 0.9 mg/dL Final     Potassium   Date Value Ref Range Status   08/20/2023 3.7 3.7 - 5.3 mmol/L Final

## 2023-09-28 ENCOUNTER — TELEPHONE (OUTPATIENT)
Age: 73
End: 2023-09-28

## 2023-09-28 DIAGNOSIS — G93.40 ENCEPHALOPATHY: Primary | ICD-10-CM

## 2023-09-28 NOTE — TELEPHONE ENCOUNTER
Patient daughter call complains that after the patient takes her morning meds she has increase aggressiveness,hallucinations and cursing.

## 2023-09-29 ENCOUNTER — TELEPHONE (OUTPATIENT)
Dept: FAMILY MEDICINE CLINIC | Age: 73
End: 2023-09-29

## 2023-09-29 DIAGNOSIS — N39.0 FREQUENT UTI: Primary | ICD-10-CM

## 2023-09-29 NOTE — TELEPHONE ENCOUNTER
Incoming call came from Patient, in regard to having onset current symptoms of AGGRESSION, HALLUCINATING, SCARED AND BEING FORGETFUL, SHE IS MENTIONING PEOPLE WHO HAVE BEEN . That have been ongoing for the past few days. Due to our availability of schedule, nothing is available today to offer telemedicine visit with provider. Recommended disposition:I OFFERED PT SEVERAL APPT TIMES, DAUGHTER STATED TIMES DO NOT WORK for her mother if a message could be called in or she is believing her mother may have UTI       Future Appointments   Date Time Provider [address]   10/11/2023 11:00 AM DO Benny Molina Neuro [facility]   2023  3:30 PM [facility]   2023  1:00 PM Estrada Guevara DO Neuro [facility] Neurology -   2023  3:30 PM DO Benny Molina Neuro [facility]                   Светлана Trinidad MA         23 11:38 AM  Note     Patient daughter call complains that after the patient takes her morning meds she has increase aggressiveness,hallucinations and cursing. melatonin 3 MG TABS tablet [[id number]]     Order Details  Dose: 3 mg Route: Oral Frequency: NIGHTLY PRN for insomnia   Dispense Quantity: 30 tablet Refills: 0          Sig: Take 1 tablet by mouth nightly as needed (insomnia)         Start Date: 23 End Date: --   Written Date: 23 Expiration Date: 24   Providers    Ordering and Authorizing Provider: Nicole Jim MD NPI: [id number]   Ordering User:  Nicole Jim MD          Pharmacy    [facility] [address] ARVIND ALLISON [facility] [address] [facility] [phone] - F [phone]   [address] [place] [address] [address] [address]   Phone:  [phone]  Fax:  [phone]       Please Approve or Refuse.   Send to Pharmacy per Pt's Request: Kim Soler      Next Visit Date:  2023   Last Visit Date: 2023    Hemoglobin A1C (%)   Date Value   05/10/2023 5.7   2022 6.4   2022 4.6             ( goal A1C is

## 2023-09-29 NOTE — TELEPHONE ENCOUNTER
I did place a urine test, please send patient to the emergency room due to hallucinations     Diagnosis Orders   1.  Frequent UTI  Urinalysis with Reflex to Culture           Future Appointments   Date Time Provider [address]   10/11/2023 11:00 AM DO Benny Moncada Neuro [facility]   12/1/2023  3:30 PM John Lindsey MD [facility]   12/6/2023  1:00 PM DO Brittany Howell [facility]   12/11/2023  3:30 PM DO Benny Chin [facility]

## 2023-09-29 NOTE — TELEPHONE ENCOUNTER
Please send the patient to emergency room    Future Appointments   Date Time Provider [address]   10/11/2023 11:00 AM DO Benny Kenney Neuro [facility]   12/1/2023  3:30 [facility]   12/6/2023  1:00 PM DO Brittany Murphy [facility]   12/11/2023  3:30 PM DO Benny Kenney Neuro [facility]

## 2023-10-03 NOTE — TELEPHONE ENCOUNTER
Patient's daughter was contacted via telephone and her questions and concerns were answered to the best of my ability. The patient has been experiencing waxing and waning mental status changes including irritability, reacting to hallucinations, delusions. These symptoms seem to be more frequent since hospital discharge. I instructed to decrease Lamictal dose from 50 to 25 mg to see if her mentation will improve. She She was also instructed to go to the ER for further evaluation.

## 2023-10-06 ENCOUNTER — HOSPITAL ENCOUNTER (EMERGENCY)
Age: 73
Discharge: HOME | End: 2023-10-06
Attending: EMERGENCY MEDICINE
Payer: MEDICARE

## 2023-10-06 ENCOUNTER — APPOINTMENT (OUTPATIENT)
Dept: CT IMAGING | Age: 73
End: 2023-10-06
Payer: MEDICARE

## 2023-10-06 VITALS
HEIGHT: 59 IN | DIASTOLIC BLOOD PRESSURE: 62 MMHG | BODY MASS INDEX: 24.19 KG/M2 | RESPIRATION RATE: 23 BRPM | TEMPERATURE: 98.2 F | OXYGEN SATURATION: 95 % | HEART RATE: 104 BPM | WEIGHT: 120 LBS | SYSTOLIC BLOOD PRESSURE: 123 MMHG

## 2023-10-06 DIAGNOSIS — N30.01 ACUTE CYSTITIS WITH HEMATURIA: Primary | ICD-10-CM

## 2023-10-06 LAB
ALBUMIN SERPL-MCNC: 3.6 G/DL (ref 3.5–5.2)
ALP SERPL-CCNC: 127 U/L (ref 35–104)
ALT SERPL-CCNC: 21 U/L (ref 5–33)
ANION GAP SERPL CALCULATED.3IONS-SCNC: 13 MMOL/L (ref 9–17)
AST SERPL-CCNC: 32 U/L
BACTERIA URNS QL MICRO: ABNORMAL
BASOPHILS # BLD: 0 K/UL (ref 0–0.2)
BASOPHILS NFR BLD: 1 % (ref 0–2)
BILIRUB SERPL-MCNC: 0.4 MG/DL (ref 0.3–1.2)
BILIRUB UR QL STRIP: NEGATIVE
BUN SERPL-MCNC: 28 MG/DL (ref 8–23)
CALCIUM SERPL-MCNC: 9 MG/DL (ref 8.6–10.4)
CASTS #/AREA URNS LPF: ABNORMAL /LPF
CHLORIDE SERPL-SCNC: 100 MMOL/L (ref 98–107)
CLARITY UR: CLEAR
CO2 SERPL-SCNC: 30 MMOL/L (ref 20–31)
COLOR UR: YELLOW
CREAT SERPL-MCNC: 3.6 MG/DL (ref 0.5–0.9)
EKG ATRIAL RATE: 125 BPM
EKG Q-T INTERVAL: 356 MS
EKG QRS DURATION: 92 MS
EKG QTC CALCULATION (BAZETT): 477 MS
EKG R AXIS: -5 DEGREES
EKG T AXIS: 142 DEGREES
EKG VENTRICULAR RATE: 108 BPM
EOSINOPHIL # BLD: 0.2 K/UL (ref 0–0.4)
EOSINOPHILS RELATIVE PERCENT: 3 % (ref 0–4)
EPI CELLS #/AREA URNS HPF: ABNORMAL /HPF
ERYTHROCYTE [DISTWIDTH] IN BLOOD BY AUTOMATED COUNT: 16 % (ref 11.5–14.9)
GFR SERPL CREATININE-BSD FRML MDRD: 13 ML/MIN/1.73M2
GLUCOSE SERPL-MCNC: 124 MG/DL (ref 70–99)
GLUCOSE UR STRIP-MCNC: NEGATIVE MG/DL
HCT VFR BLD AUTO: 32.2 % (ref 36–46)
HGB BLD-MCNC: 10.3 G/DL (ref 12–16)
HGB UR QL STRIP.AUTO: ABNORMAL
KETONES UR STRIP-MCNC: NEGATIVE MG/DL
LACTATE BLDV-SCNC: 2.1 MMOL/L (ref 0.5–1.9)
LEUKOCYTE ESTERASE UR QL STRIP: ABNORMAL
LYMPHOCYTES NFR BLD: 0.8 K/UL (ref 1–4.8)
LYMPHOCYTES RELATIVE PERCENT: 15 % (ref 24–44)
MAGNESIUM SERPL-MCNC: 2 MG/DL (ref 1.6–2.6)
MCH RBC QN AUTO: 31.4 PG (ref 26–34)
MCHC RBC AUTO-ENTMCNC: 32 G/DL (ref 31–37)
MCV RBC AUTO: 98.2 FL (ref 80–100)
MONOCYTES NFR BLD: 0.7 K/UL (ref 0.1–1.3)
MONOCYTES NFR BLD: 13 % (ref 1–7)
NEUTROPHILS NFR BLD: 68 % (ref 36–66)
NEUTS SEG NFR BLD: 3.9 K/UL (ref 1.3–9.1)
NITRITE UR QL STRIP: NEGATIVE
PH UR STRIP: 8 [PH] (ref 5–8)
PLATELET # BLD AUTO: 193 K/UL (ref 150–450)
PMV BLD AUTO: 7.5 FL (ref 6–12)
POTASSIUM SERPL-SCNC: 4.5 MMOL/L (ref 3.7–5.3)
PROT SERPL-MCNC: 6.2 G/DL (ref 6.4–8.3)
PROT UR STRIP-MCNC: ABNORMAL MG/DL
RBC # BLD AUTO: 3.28 M/UL (ref 4–5.2)
RBC #/AREA URNS HPF: ABNORMAL /HPF
SODIUM SERPL-SCNC: 143 MMOL/L (ref 135–144)
SP GR UR STRIP: 1.01 (ref 1–1.03)
TROPONIN I SERPL HS-MCNC: 84 NG/L (ref 0–14)
TROPONIN I SERPL HS-MCNC: 84 NG/L (ref 0–14)
TSH SERPL DL<=0.05 MIU/L-ACNC: 1.67 UIU/ML (ref 0.3–5)
UROBILINOGEN UR STRIP-ACNC: NORMAL EU/DL (ref 0–1)
WBC #/AREA URNS HPF: ABNORMAL /HPF
WBC OTHER # BLD: 5.6 K/UL (ref 3.5–11)

## 2023-10-06 PROCEDURE — 80053 COMPREHEN METABOLIC PANEL: CPT

## 2023-10-06 PROCEDURE — 99284 EMERGENCY DEPT VISIT MOD MDM: CPT

## 2023-10-06 PROCEDURE — 83605 ASSAY OF LACTIC ACID: CPT

## 2023-10-06 PROCEDURE — 85025 COMPLETE CBC W/AUTO DIFF WBC: CPT

## 2023-10-06 PROCEDURE — 81001 URINALYSIS AUTO W/SCOPE: CPT

## 2023-10-06 PROCEDURE — 96365 THER/PROPH/DIAG IV INF INIT: CPT

## 2023-10-06 PROCEDURE — 51701 INSERT BLADDER CATHETER: CPT

## 2023-10-06 PROCEDURE — 93010 ELECTROCARDIOGRAM REPORT: CPT | Performed by: INTERNAL MEDICINE

## 2023-10-06 PROCEDURE — 84443 ASSAY THYROID STIM HORMONE: CPT

## 2023-10-06 PROCEDURE — 87086 URINE CULTURE/COLONY COUNT: CPT

## 2023-10-06 PROCEDURE — 36415 COLL VENOUS BLD VENIPUNCTURE: CPT

## 2023-10-06 PROCEDURE — 84484 ASSAY OF TROPONIN QUANT: CPT

## 2023-10-06 PROCEDURE — 70450 CT HEAD/BRAIN W/O DYE: CPT

## 2023-10-06 PROCEDURE — 83735 ASSAY OF MAGNESIUM: CPT

## 2023-10-06 PROCEDURE — 2580000003 HC RX 258: Performed by: EMERGENCY MEDICINE

## 2023-10-06 PROCEDURE — 93005 ELECTROCARDIOGRAM TRACING: CPT | Performed by: EMERGENCY MEDICINE

## 2023-10-06 PROCEDURE — 6360000002 HC RX W HCPCS: Performed by: EMERGENCY MEDICINE

## 2023-10-06 RX ORDER — FLUTICASONE PROPIONATE 50 MCG
2 SPRAY, SUSPENSION (ML) NASAL DAILY
COMMUNITY

## 2023-10-06 RX ORDER — CEFDINIR 300 MG/1
CAPSULE ORAL
Qty: 4 CAPSULE | Refills: 0 | Status: SHIPPED | OUTPATIENT
Start: 2023-10-06

## 2023-10-06 RX ADMIN — CEFTRIAXONE SODIUM 1000 MG: 1 INJECTION, POWDER, FOR SOLUTION INTRAMUSCULAR; INTRAVENOUS at 19:06

## 2023-10-06 ASSESSMENT — PAIN - FUNCTIONAL ASSESSMENT: PAIN_FUNCTIONAL_ASSESSMENT: NONE - DENIES PAIN

## 2023-10-06 NOTE — ED NOTES
Discharge instructions reviewed with patient and daughter. Aware new prescription sent to documented preferred pharmacy. Encouraged f/u with PCP. Personal wheelchair to private vehicle with all personal belongings.      Fide Yung RN  10/06/23 1947

## 2023-10-06 NOTE — ED NOTES
Update to POC which includes IV ATB then d/c home. Daughter at bedside. Verbalized understanding. Rocephin infusing.      Serena Palomo, RN  10/06/23 2584

## 2023-10-06 NOTE — ED NOTES
Pt is brought to this ER by her daughter who reports the pt has been having hallucinations at home. Pt is having appropriate conversation with this nurse, but pt's daughter reports the pt has hallucinations about dead relatives who are still alive among other things that are not true but believed by the pt. Pt receives hemodialysis on T,R,Sat, and she did receive a full treatment yesterday. Pt has a dialysis port in her rt upper chest, and a rt arm AV fistula that has yet to be used. Pt arrives A+O x 4, GCS = 15, PMS x 4 intact, eupneic, and PWD. Pt's pulse is slightly tachycardic. Pt's abdomen is large et soft. Bilat lower leg pitting edema noted with slight redness that the pt's daughter states is chronic. Ongoing stage 2 coccyx breakdown noted.      Jennifer Baker RN  10/06/23 1948

## 2023-10-06 NOTE — PROGRESS NOTES
Pharmacy Medication History Note      List of current medications patient is taking is complete. Source of information: OARRS, Patient, Artur Prater), Dispense report ([facility])    Changes made to medication list:  Medications removed (include reason, ex. therapy complete or physician discontinued, noncompliance):  None    Medications flagged for provider review:  Phoslo, Lamictal (duplicate), Keppra - flagged since 9/13/23    Medications added/doses adjusted: Added Flonase  Adjusted Lamictal to 25 mg daily      Other notes (ex. Recent course of antibiotics, Coumadin dosing):  OARRS negative       Current Home Medication List at Time of Admission:  Prior to Admission medications    Medication Sig   fluticasone (FLONASE) 50 MCG/ACT nasal spray 2 sprays by Nasal route daily   mirtazapine (REMERON) 7.5 MG tablet Take 1 tablet by mouth nightly   lamoTRIgine (LAMICTAL) 25 MG tablet Take 1 tablet by mouth 2 times daily  Patient taking differently: Take 1 tablet by mouth daily   Lift Chair MISC by Does not apply route   metoprolol succinate (TOPROL XL) 25 MG extended release tablet Take 1 tablet by mouth every evening Hold if BP bellow 115/65. Stop metoprolol tartrate   hydrOXYzine HCl (ATARAX) 50 MG tablet Take 1 tablet by mouth daily as needed for Anxiety   albuterol (PROVENTIL) (2.5 MG/3ML) 0.083% nebulizer solution Take 3 mLs by nebulization every 6 hours as needed for Wheezing or Shortness of Breath   aspirin 81 MG EC tablet Take 1 tablet by mouth daily   melatonin 3 MG TABS tablet Take 1 tablet by mouth nightly as needed (insomnia)   mupirocin (BACTROBAN) 2 % ointment Apply topically 3 times daily x 10 days.    albuterol sulfate HFA (PROAIR HFA) 108 (90 Base) MCG/ACT inhaler Inhale 2 puffs into the lungs every 6 hours as needed for Wheezing or Shortness of Breath (cough)   fluticasone-salmeterol (ADVAIR HFA) 115-21 MCG/ACT inhaler Inhale 2 puffs into the lungs 2 times daily Maintenance inhaler   tiotropium 10/6/2023 at 4:25 PM

## 2023-10-07 LAB
MICROORGANISM SPEC CULT: NO GROWTH
SPECIMEN DESCRIPTION: NORMAL

## 2023-10-07 ASSESSMENT — ENCOUNTER SYMPTOMS
COUGH: 0
BACK PAIN: 0
NAUSEA: 0

## 2023-10-07 NOTE — ED PROVIDER NOTES
remains focal edema   seen in the right temporal lobe with evidence of an isodense mass along the   right temporal pole suggesting a meningioma effacing the right middle   cerebral artery. RECOMMENDATIONS:   Post-contrast MR images of the brain for further evaluation             LABS: Lab orders shown below, the results are reviewed by myself, and all abnormals are listed below.   Labs Reviewed   CBC WITH AUTO DIFFERENTIAL - Abnormal; Notable for the following components:       Result Value    RBC 3.28 (*)     Hemoglobin 10.3 (*)     Hematocrit 32.2 (*)     RDW 16.0 (*)     Neutrophils % 68 (*)     Lymphocytes % 15 (*)     Monocytes % 13 (*)     Lymphocytes Absolute 0.80 (*)     All other components within normal limits   COMPREHENSIVE METABOLIC PANEL - Abnormal; Notable for the following components:    Glucose 124 (*)     BUN 28 (*)     Creatinine 3.6 (*)     Est, Glom Filt Rate 13 (*)     Total Protein 6.2 (*)     Alkaline Phosphatase 127 (*)     AST 32 (*)     All other components within normal limits   TROPONIN - Abnormal; Notable for the following components:    Troponin, High Sensitivity 84 (*)     All other components within normal limits   TROPONIN - Abnormal; Notable for the following components:    Troponin, High Sensitivity 84 (*)     All other components within normal limits   URINALYSIS WITH REFLEX TO CULTURE - Abnormal; Notable for the following components:    Urine Hgb TRACE (*)     Protein, UA 3+ (*)     Leukocyte Esterase, Urine TRACE (*)     All other components within normal limits   MICROSCOPIC URINALYSIS - Abnormal; Notable for the following components:    WBC, UA 21 TO 50 (*)     RBC, UA 6 TO 9 (*)     Casts UA 3 to 5 (*)     All other components within normal limits   LACTATE, SEPSIS - Abnormal; Notable for the following components:    Lactic Acid, Sepsis 2.1 (*)     All other components within normal limits   CULTURE, URINE   MAGNESIUM   TSH WITH REFLEX       Vitals Reviewed:    Vitals: 10/06/23 1626 10/06/23 1842 10/06/23 1900 10/06/23 1930   BP: (!) 146/69 (!) 153/77 (!) 144/60 123/62   Pulse: (!) 107 (!) 115 (!) 101 (!) 104   Resp: 21 18 23 23   Temp:       TempSrc:       SpO2: 96% 98% 97% 95%   Weight:       Height:         MEDICATIONS GIVEN TO PATIENT THIS ENCOUNTER:  Orders Placed This Encounter   Medications    cefTRIAXone (ROCEPHIN) 1,000 mg in sodium chloride 0.9 % 50 mL IVPB (mini-bag)     Order Specific Question:   Antimicrobial Indications     Answer:   Urinary Tract Infection    cefdinir (OMNICEF) 300 MG capsule     Sig: Take 1 tablet after dialysis for the next on October 7th, 10th, 12th and 14th.  qt     Dispense:  4 capsule     Refill:  0     DISCHARGE PRESCRIPTIONS:  Discharge Medication List as of 10/6/2023  7:15 PM        START taking these medications    Details   cefdinir (OMNICEF) 300 MG capsule Take 1 tablet after dialysis for the next on October 7th, 10th, 12th and 14th.  qt, Disp-4 capsule, R-0Normal           PHYSICIAN CONSULTS ORDERED THIS ENCOUNTER:  IP CONSULT TO INTERNAL MEDICINE     FINAL IMPRESSION      1.  Acute cystitis with hematuria          DISPOSITION/PLAN   DISPOSITION Decision To Discharge 10/06/2023 07:01:54 PM      PATIENT REFERRED TO:  Alka Romo, Christian [address] [place] [address] Drive    In 3 days      [facility] ED  [address] [facility] [address]  [phone]    If symptoms worsen      DISCHARGE MEDICATIONS:  Discharge Medication List as of 10/6/2023  7:15 PM        START taking these medications    Details   cefdinir (OMNICEF) 300 MG capsule Take 1 tablet after dialysis for the next on October 7th, 10th, 12th and 14th.  qt, Disp-4 capsule, R-0Normal               Ray Calle MD  Attending Emergency Physician                     Ray Calle MD  10/07/23 5985

## 2023-10-11 ENCOUNTER — TELEPHONE (OUTPATIENT)
Dept: FAMILY MEDICINE CLINIC | Age: 73
End: 2023-10-11

## 2023-10-11 DIAGNOSIS — E78.5 HYPERLIPIDEMIA WITH TARGET LDL LESS THAN 70: ICD-10-CM

## 2023-10-11 DIAGNOSIS — D32.9 MENINGIOMA (HCC): ICD-10-CM

## 2023-10-11 DIAGNOSIS — F51.04 PSYCHOPHYSIOLOGICAL INSOMNIA: Primary | ICD-10-CM

## 2023-10-11 DIAGNOSIS — K21.9 GASTROESOPHAGEAL REFLUX DISEASE, UNSPECIFIED WHETHER ESOPHAGITIS PRESENT: ICD-10-CM

## 2023-10-11 DIAGNOSIS — G93.40 ENCEPHALOPATHY: Primary | ICD-10-CM

## 2023-10-11 DIAGNOSIS — I25.10 CORONARY ARTERY DISEASE INVOLVING NATIVE CORONARY ARTERY OF NATIVE HEART WITHOUT ANGINA PECTORIS: ICD-10-CM

## 2023-10-11 RX ORDER — OMEPRAZOLE 20 MG/1
CAPSULE, DELAYED RELEASE ORAL
Qty: 90 CAPSULE | Refills: 3 | OUTPATIENT
Start: 2023-10-11

## 2023-10-11 RX ORDER — CHOLECALCIFEROL (VITAMIN D3) 125 MCG
5 CAPSULE ORAL EVERY EVENING
Qty: 90 TABLET | Refills: 3 | Status: SHIPPED | OUTPATIENT
Start: 2023-10-11

## 2023-10-11 RX ORDER — FAMOTIDINE 20 MG/1
20 TABLET, FILM COATED ORAL
Qty: 90 TABLET | Refills: 3 | Status: SHIPPED | OUTPATIENT
Start: 2023-10-11

## 2023-10-11 RX ORDER — ATORVASTATIN CALCIUM 40 MG/1
40 TABLET, FILM COATED ORAL EVERY EVENING
Qty: 90 TABLET | Refills: 3 | Status: SHIPPED | OUTPATIENT
Start: 2023-10-11

## 2023-10-11 RX ORDER — ASPIRIN 81 MG/1
81 TABLET ORAL DAILY
Qty: 90 TABLET | Refills: 1 | Status: SHIPPED | OUTPATIENT
Start: 2023-10-11

## 2023-10-11 NOTE — TELEPHONE ENCOUNTER
[facility] ([facility]) ED Follow up Call    Reason for ED visit:  Hallucinations         Hi Tristin Edwards , this is Sissy from Kayleigh Felipe's office, just calling to see how you are doing after your recent ED visit. Did you receive discharge instructions? Yes  Do you understand the discharge instructions? Yes  Did the ED give you any new prescriptions? Yes  Were you able to fill your prescriptions? Yes      Do you have one of our red, yellow and green  Zone sheets that help you to determine when you should go to the ED? Not Applicable      Do you need or want to make a follow up appt with your PCP? Yes    Do you have any further needs in the home i.e. Equipment?   No        FU appts/Provider:    Future Appointments   Date Time Provider [address]   10/20/2023 12:45 PM [facility] PAT RM 1 [facility]   10/25/2023  3:00 PM STV ECHO 1 [facility]Z ADRIENNE [facility] Radiolog   12/1/2023  3:30 PM Salina Brambila MD fp sc [facility]   12/6/2023  1:00 PM iLs Dang DO Neuro [facility] Neurology -   12/11/2023  3:30 PM DO Benny Dillon Neuro [facility]

## 2023-10-11 NOTE — TELEPHONE ENCOUNTER
Please Approve or Refuse.   Send to Pharmacy per Pt's Request: PATIENT ALSO ASKING FOR ASA 81 mg , ALSO MELATONIN 3 mg ASKING IF THIS MEDICATION CAN HAVE A HIGHER DOSE DUE TO NOT HELPING AT NIGHT      Next Visit Date:  10/16/2023   Last Visit Date: 8/23/2023    Hemoglobin A1C (%)   Date Value   05/10/2023 5.7   11/11/2022 6.4   03/31/2022 4.6             ( goal A1C is < 7)   BP Readings from Last 3 Encounters:   10/06/23 123/62   09/11/23 (!) 99/58   09/06/23 120/76          (goal 120/80)  BUN   Date Value Ref Range Status   10/06/2023 28 (H) 8 - 23 mg/dL Final     Creatinine   Date Value Ref Range Status   10/06/2023 3.6 (H) 0.5 - 0.9 mg/dL Final     Potassium   Date Value Ref Range Status   10/06/2023 4.5 3.7 - 5.3 mmol/L Final

## 2023-10-16 ENCOUNTER — OFFICE VISIT (OUTPATIENT)
Dept: FAMILY MEDICINE CLINIC | Age: 73
End: 2023-10-16
Payer: MEDICARE

## 2023-10-16 VITALS
HEART RATE: 98 BPM | DIASTOLIC BLOOD PRESSURE: 80 MMHG | OXYGEN SATURATION: 94 % | TEMPERATURE: 97.2 F | HEIGHT: 59 IN | SYSTOLIC BLOOD PRESSURE: 136 MMHG | BODY MASS INDEX: 24.24 KG/M2

## 2023-10-16 DIAGNOSIS — N18.6 TYPE 2 DIABETES MELLITUS WITH CHRONIC KIDNEY DISEASE ON CHRONIC DIALYSIS, WITHOUT LONG-TERM CURRENT USE OF INSULIN (HCC): ICD-10-CM

## 2023-10-16 DIAGNOSIS — R53.83 OTHER FATIGUE: ICD-10-CM

## 2023-10-16 DIAGNOSIS — N30.01 ACUTE CYSTITIS WITH HEMATURIA: ICD-10-CM

## 2023-10-16 DIAGNOSIS — F01.B4 MODERATE VASCULAR DEMENTIA WITH ANXIETY (HCC): ICD-10-CM

## 2023-10-16 DIAGNOSIS — I10 HYPERTENSION, ESSENTIAL: Primary | ICD-10-CM

## 2023-10-16 DIAGNOSIS — E03.9 ACQUIRED HYPOTHYROIDISM: ICD-10-CM

## 2023-10-16 DIAGNOSIS — E11.22 TYPE 2 DIABETES MELLITUS WITH CHRONIC KIDNEY DISEASE ON CHRONIC DIALYSIS, WITHOUT LONG-TERM CURRENT USE OF INSULIN (HCC): ICD-10-CM

## 2023-10-16 DIAGNOSIS — L89.309 PRESSURE INJURY OF SKIN OF BUTTOCK, UNSPECIFIED INJURY STAGE, UNSPECIFIED LATERALITY: ICD-10-CM

## 2023-10-16 DIAGNOSIS — E55.9 VITAMIN D DEFICIENCY: ICD-10-CM

## 2023-10-16 DIAGNOSIS — Z99.2 TYPE 2 DIABETES MELLITUS WITH CHRONIC KIDNEY DISEASE ON CHRONIC DIALYSIS, WITHOUT LONG-TERM CURRENT USE OF INSULIN (HCC): ICD-10-CM

## 2023-10-16 LAB — HBA1C MFR BLD: 5.5 %

## 2023-10-16 PROCEDURE — 2022F DILAT RTA XM EVC RTNOPTHY: CPT | Performed by: NURSE PRACTITIONER

## 2023-10-16 PROCEDURE — 1036F TOBACCO NON-USER: CPT | Performed by: NURSE PRACTITIONER

## 2023-10-16 PROCEDURE — 3017F COLORECTAL CA SCREEN DOC REV: CPT | Performed by: NURSE PRACTITIONER

## 2023-10-16 PROCEDURE — 1090F PRES/ABSN URINE INCON ASSESS: CPT | Performed by: NURSE PRACTITIONER

## 2023-10-16 PROCEDURE — G8427 DOCREV CUR MEDS BY ELIG CLIN: HCPCS | Performed by: NURSE PRACTITIONER

## 2023-10-16 PROCEDURE — G8484 FLU IMMUNIZE NO ADMIN: HCPCS | Performed by: NURSE PRACTITIONER

## 2023-10-16 PROCEDURE — 83036 HEMOGLOBIN GLYCOSYLATED A1C: CPT | Performed by: NURSE PRACTITIONER

## 2023-10-16 PROCEDURE — G8420 CALC BMI NORM PARAMETERS: HCPCS | Performed by: NURSE PRACTITIONER

## 2023-10-16 PROCEDURE — 3078F DIAST BP <80 MM HG: CPT | Performed by: NURSE PRACTITIONER

## 2023-10-16 PROCEDURE — G8399 PT W/DXA RESULTS DOCUMENT: HCPCS | Performed by: NURSE PRACTITIONER

## 2023-10-16 PROCEDURE — 1123F ACP DISCUSS/DSCN MKR DOCD: CPT | Performed by: NURSE PRACTITIONER

## 2023-10-16 PROCEDURE — 99215 OFFICE O/P EST HI 40 MIN: CPT | Performed by: NURSE PRACTITIONER

## 2023-10-16 PROCEDURE — 3044F HG A1C LEVEL LT 7.0%: CPT | Performed by: NURSE PRACTITIONER

## 2023-10-16 PROCEDURE — 3074F SYST BP LT 130 MM HG: CPT | Performed by: NURSE PRACTITIONER

## 2023-10-16 RX ORDER — LIDOCAINE 4 G/G
PATCH TOPICAL
COMMUNITY

## 2023-10-16 RX ORDER — AMMONIUM LACTATE 12 G/100G
1 LOTION TOPICAL
COMMUNITY
End: 2023-10-20

## 2023-10-16 RX ORDER — NYSTATIN 100000 [USP'U]/G
POWDER TOPICAL
Qty: 60 G | Refills: 0 | Status: SHIPPED | OUTPATIENT
Start: 2023-10-16

## 2023-10-16 SDOH — ECONOMIC STABILITY: FOOD INSECURITY: WITHIN THE PAST 12 MONTHS, THE FOOD YOU BOUGHT JUST DIDN'T LAST AND YOU DIDN'T HAVE MONEY TO GET MORE.: NEVER TRUE

## 2023-10-16 SDOH — ECONOMIC STABILITY: FOOD INSECURITY: WITHIN THE PAST 12 MONTHS, YOU WORRIED THAT YOUR FOOD WOULD RUN OUT BEFORE YOU GOT MONEY TO BUY MORE.: NEVER TRUE

## 2023-10-16 SDOH — ECONOMIC STABILITY: INCOME INSECURITY: HOW HARD IS IT FOR YOU TO PAY FOR THE VERY BASICS LIKE FOOD, HOUSING, MEDICAL CARE, AND HEATING?: NOT HARD AT ALL

## 2023-10-16 ASSESSMENT — PATIENT HEALTH QUESTIONNAIRE - PHQ9
2. FEELING DOWN, DEPRESSED OR HOPELESS: 0
SUM OF ALL RESPONSES TO PHQ QUESTIONS 1-9: 0
SUM OF ALL RESPONSES TO PHQ9 QUESTIONS 1 & 2: 0
SUM OF ALL RESPONSES TO PHQ QUESTIONS 1-9: 0
1. LITTLE INTEREST OR PLEASURE IN DOING THINGS: 0

## 2023-10-17 ASSESSMENT — ENCOUNTER SYMPTOMS
ABDOMINAL DISTENTION: 0
BACK PAIN: 0
DIARRHEA: 0
BLOOD IN STOOL: 0
NAUSEA: 0
COUGH: 0
ABDOMINAL PAIN: 0
CHEST TIGHTNESS: 0
SHORTNESS OF BREATH: 0
VOMITING: 0
CONSTIPATION: 0
WHEEZING: 0

## 2023-10-18 ENCOUNTER — HOSPITAL ENCOUNTER (OUTPATIENT)
Dept: WOUND CARE | Age: 73
Discharge: HOME | End: 2023-10-18
Payer: MEDICARE

## 2023-10-18 VITALS
WEIGHT: 130 LBS | TEMPERATURE: 98.5 F | BODY MASS INDEX: 26.21 KG/M2 | RESPIRATION RATE: 20 BRPM | HEIGHT: 59 IN | HEART RATE: 102 BPM | DIASTOLIC BLOOD PRESSURE: 56 MMHG | SYSTOLIC BLOOD PRESSURE: 158 MMHG

## 2023-10-18 DIAGNOSIS — N18.6 TYPE 2 DIABETES MELLITUS WITH CHRONIC KIDNEY DISEASE ON CHRONIC DIALYSIS, WITHOUT LONG-TERM CURRENT USE OF INSULIN (HCC): ICD-10-CM

## 2023-10-18 DIAGNOSIS — Z74.09 DECREASED STRENGTH, ENDURANCE, AND MOBILITY: ICD-10-CM

## 2023-10-18 DIAGNOSIS — Z99.2 TYPE 2 DIABETES MELLITUS WITH CHRONIC KIDNEY DISEASE ON CHRONIC DIALYSIS, WITHOUT LONG-TERM CURRENT USE OF INSULIN (HCC): ICD-10-CM

## 2023-10-18 DIAGNOSIS — R53.1 DECREASED STRENGTH, ENDURANCE, AND MOBILITY: ICD-10-CM

## 2023-10-18 DIAGNOSIS — L89.153 PRESSURE ULCER OF COCCYGEAL REGION, STAGE III (HCC): Primary | ICD-10-CM

## 2023-10-18 DIAGNOSIS — J44.9 CHRONIC OBSTRUCTIVE PULMONARY DISEASE, UNSPECIFIED COPD TYPE (HCC): ICD-10-CM

## 2023-10-18 DIAGNOSIS — E11.22 TYPE 2 DIABETES MELLITUS WITH CHRONIC KIDNEY DISEASE ON CHRONIC DIALYSIS, WITHOUT LONG-TERM CURRENT USE OF INSULIN (HCC): ICD-10-CM

## 2023-10-18 DIAGNOSIS — R68.89 DECREASED STRENGTH, ENDURANCE, AND MOBILITY: ICD-10-CM

## 2023-10-18 DIAGNOSIS — E11.42 TYPE 2 DIABETES MELLITUS WITH POLYNEUROPATHY (HCC): ICD-10-CM

## 2023-10-18 PROCEDURE — 99213 OFFICE O/P EST LOW 20 MIN: CPT

## 2023-10-18 PROCEDURE — 11042 DBRDMT SUBQ TIS 1ST 20SQCM/<: CPT

## 2023-10-18 PROCEDURE — 11042 DBRDMT SUBQ TIS 1ST 20SQCM/<: CPT | Performed by: NURSE PRACTITIONER

## 2023-10-18 RX ORDER — LIDOCAINE HYDROCHLORIDE 40 MG/ML
SOLUTION TOPICAL ONCE
Status: DISCONTINUED | OUTPATIENT
Start: 2023-10-18 | End: 2023-10-19 | Stop reason: HOSPADM

## 2023-10-18 RX ORDER — LIDOCAINE HYDROCHLORIDE 20 MG/ML
JELLY TOPICAL ONCE
OUTPATIENT
Start: 2023-10-18 | End: 2023-10-18

## 2023-10-18 RX ORDER — LIDOCAINE 50 MG/G
1 PATCH TOPICAL DAILY
Qty: 30 PATCH | Refills: 3 | Status: SHIPPED | OUTPATIENT
Start: 2023-10-18

## 2023-10-18 RX ORDER — LIDOCAINE HYDROCHLORIDE 40 MG/ML
SOLUTION TOPICAL ONCE
OUTPATIENT
Start: 2023-10-18 | End: 2023-10-18

## 2023-10-18 RX ORDER — FLUTICASONE PROPIONATE AND SALMETEROL XINAFOATE 115; 21 UG/1; UG/1
2 AEROSOL, METERED RESPIRATORY (INHALATION) 2 TIMES DAILY
Qty: 36 G | Refills: 3 | Status: SHIPPED | OUTPATIENT
Start: 2023-10-18

## 2023-10-18 ASSESSMENT — ENCOUNTER SYMPTOMS
COUGH: 0
RHINORRHEA: 0
NAUSEA: 0
DIARRHEA: 0
VOMITING: 0
SHORTNESS OF BREATH: 0

## 2023-10-18 ASSESSMENT — PAIN DESCRIPTION - PAIN TYPE: TYPE: CHRONIC PAIN

## 2023-10-18 ASSESSMENT — PAIN DESCRIPTION - ORIENTATION: ORIENTATION: MID

## 2023-10-18 ASSESSMENT — PAIN SCALES - GENERAL: PAINLEVEL_OUTOF10: 0

## 2023-10-18 ASSESSMENT — PAIN DESCRIPTION - FREQUENCY: FREQUENCY: INTERMITTENT

## 2023-10-18 ASSESSMENT — PAIN DESCRIPTION - ONSET: ONSET: ON-GOING

## 2023-10-18 ASSESSMENT — PAIN - FUNCTIONAL ASSESSMENT: PAIN_FUNCTIONAL_ASSESSMENT: PREVENTS OR INTERFERES SOME ACTIVE ACTIVITIES AND ADLS

## 2023-10-18 ASSESSMENT — PAIN DESCRIPTION - LOCATION: LOCATION: COCCYX

## 2023-10-18 NOTE — TELEPHONE ENCOUNTER
Please Approve or Refuse.   Send to Pharmacy per Pt's Request:      Next Visit Date:  12/1/2023   Last Visit Date: 10/16/2023    Hemoglobin A1C (%)   Date Value   10/16/2023 5.5   05/10/2023 5.7   11/11/2022 6.4             ( goal A1C is < 7)   BP Readings from Last 3 Encounters:   10/18/23 (!) 158/56   10/16/23 136/80   10/06/23 123/62          (goal 120/80)  BUN   Date Value Ref Range Status   10/06/2023 28 (H) 8 - 23 mg/dL Final     Creatinine   Date Value Ref Range Status   10/06/2023 3.6 (H) 0.5 - 0.9 mg/dL Final     Potassium   Date Value Ref Range Status   10/06/2023 4.5 3.7 - 5.3 mmol/L Final

## 2023-10-18 NOTE — PROGRESS NOTES
[address] [place] [address]   Progress Note and Procedure Note      Nonnie Sprain Harsh  MEDICAL RECORD NUMBER:  [id number]  AGE: 75 y.o. GENDER: female  : 1950  EPISODE DATE:  10/18/2023    Subjective:     Chief Complaint   Patient presents with    Wound Check     coccyx         HISTORY of PRESENT ILLNESS HPI     Farhat Judd is a 75 y.o. female who presents today for wound/ulcer evaluation. History of Wound Context: presents with daughter for initial evaluation of coccyx pressure ulcer. She is sitting in her wheelchair or lift chair - she does not walk. Can stand with assistance. She is sleeping in her lift chair.    Wound/Ulcer Pain Timing/Severity: intermittent  Quality of pain: sharp  Severity:  2 / 10   Modifying Factors: None  Associated Signs/Symptoms: none    Ulcer Identification:  Ulcer Type: pressure  Contributing Factors: diabetes, chronic pressure, decreased mobility, shear force, and ESRD on HD          PAST MEDICAL HISTORY        Diagnosis Date    Acute CVA (cerebrovascular accident) ([address]) 2020    Acute kidney injury superimposed on CKD ([address]) 2022    Acute on chronic combined systolic (congestive) and diastolic (congestive) heart failure ([address]) 2022    Acute respiratory failure with hypoxia and hypercapnia (HCC)     JOY (acute kidney injury) ([address]) 1/15/2022    Arthritis     Asthma     Bilateral lower extremity edema 2022    Bradycardia 2022    Chronic diastolic congestive heart failure ([address]) 2020    Chronic ulcer of left leg with fat layer exposed ([address]) 2022    CKD stage 4 secondary to hypertension ([address]) 2020    Dependence on renal dialysis ([address])     ESRD (end stage renal disease) ([address])     Essential hypertension 2020    Gastrointestinal hemorrhage 2022    Hallucinations 2021    Hard of hearing     Head contusion 2020    Hyperkalemia 2022    Iron deficiency anemia, unspecified     Leg ulcer, left, with fat 2.18

## 2023-10-18 NOTE — TELEPHONE ENCOUNTER
Please Approve or Refuse.   Send to Pharmacy per Pt's Request:      Next Visit Date:  12/1/2023   Last Visit Date: 10/16/2023    Hemoglobin A1C (%)   Date Value   10/16/2023 5.5   05/10/2023 5.7   11/11/2022 6.4             ( goal A1C is < 7)   BP Readings from Last 3 Encounters:   10/16/23 136/80   10/06/23 123/62   09/11/23 (!) 99/58          (goal 120/80)  BUN   Date Value Ref Range Status   10/06/2023 28 (H) 8 - 23 mg/dL Final     Creatinine   Date Value Ref Range Status   10/06/2023 3.6 (H) 0.5 - 0.9 mg/dL Final     Potassium   Date Value Ref Range Status   10/06/2023 4.5 3.7 - 5.3 mmol/L Final

## 2023-10-18 NOTE — DISCHARGE INSTRUCTIONS
[facility]      Visit  Discharge Instructions / Physician Orders  DATE: 10/18/2023     Home Care: none     SUPPLIES ORDERED THRU: [employer]                  DATE LAST SUPPLIED     Wound Location:  coccyx     Cleanse with: Liquid antibacterial soap and water, rinse well      Dressing Orders:  Primary dressing       lori,                 Secondary dressing    4x4 zetivite bordered silicone dressing daily                       secure with           x 30days     Frequency:  daily     Additional Orders: Increase protein to diet (meat, cheese, eggs, fish, peanut butter, nuts and beans)  Multivitamin daily    OFFLOADING [x] YES  TYPE:                  [] NA     Roho cushion while in chair, air mattress IF in bed. Weekly wound care visits until determined otherwise. Antibiotic therapy-wound care related YES [] NO [] NA[x]    MY CHART []     Smart Device  []     HYPERBARIC TREATMENT-                TREATMENT #                          Your next appointment with the [address] [place] [address] is in 2 weeks                                                                                                   (Please note your next appointment above and if you are unable to keep, kindly give a 24 hour notice. Thank you.)  If more than 15 min late we cannot guarantee you will be seen due to clinician schedule  Per Policy, Excessive cancellation will call for dismissal from program.  If you experience any of the following, please call the [address] [place] [address] during business hours:  [phone]     * Increase in Pain  * Temperature over 101  * Increase in drainage from your wound  * Drainage with a foul odor  * Bleeding  * Increase in swelling  * Need for compression bandage changes due to slippage, breakthrough drainage. If you need medical attention outside of the business hours of the [address] [place] [address] please contact your PCP or go to the nearest emergency room.      The information

## 2023-10-18 NOTE — PROGRESS NOTES
.    [address]:     Other [address]:     [facility] [facility]  [address]  [address] [address] [phone]  WOUND CARE Dept: [phone]   FAX JWYETAYX [unfilled]    Patient Information:      Mary Greer  [facility] [address]   [phone]   : 1950  AGE: 75 y.o. GENDER: female   TODAYS DATE:  10/18/2023    Insurance:      PRIMARY INSURANCE:  Plan: MEDICARE PART A AND B  Coverage: MEDICARE  Effective Date: 2014  [id number] - (Medicare)    SECONDARY INSURANCE:  Plan: [place]  Coverage: [place] [place]  Effective Date: 1990  [unfilled]    [unfilled]   [unfilled]     Patient Wound Information:      Problem List Items Addressed This Visit          Other    Pressure ulcer of coccygeal region, stage III ([address])       WOUNDS REQUIRING DRESSING SUPPLIES:     Wound 10/18/23 Coccyx wound #1 coccyx (Active)   Wound Image   10/18/23 1328   Wound Etiology Pressure Stage 3 10/18/23 1328   Dressing Status New drainage noted; Old drainage noted 10/18/23 1328   Wound Cleansed Cleansed with saline 10/18/23 1328   Wound Length (cm) 1.3 cm 10/18/23 1328   Wound Width (cm) 1 cm 10/18/23 1328   Wound Depth (cm) 0.2 cm 10/18/23 1328   Wound Surface Area (cm^2) 1.3 cm^2 10/18/23 1328   Wound Volume (cm^3) 0.26 cm^3 10/18/23 1328   Post-Procedure Length (cm) 1.3 cm 10/18/23 1328   Post-Procedure Width (cm) 1 cm 10/18/23 1328   Post-Procedure Depth (cm) 0.2 cm 10/18/23 1328   Post-Procedure Surface Area (cm^2) 1.3 cm^2 10/18/23 1328   Post-Procedure Volume (cm^3) 0.26 cm^3 10/18/23 1328   Wound Assessment Devitalized tissue;Slough 10/18/23 1328   Drainage Amount Moderate (25-50%) 10/18/23 1328   Drainage Description Serosanguinous; Yellow 10/18/23 1328   Odor None 10/18/23 1328   Marian-wound Assessment Blanchable erythema 10/18/23 1328   Margins Attached edges 10/18/23 1328   Number of days: 0          Supplies Requested :      WOUND #: 1   PRIMARY

## 2023-10-20 ENCOUNTER — HOSPITAL ENCOUNTER (OUTPATIENT)
Dept: PREADMISSION TESTING | Age: 73
Discharge: HOME | End: 2023-10-24
Attending: STUDENT IN AN ORGANIZED HEALTH CARE EDUCATION/TRAINING PROGRAM | Admitting: STUDENT IN AN ORGANIZED HEALTH CARE EDUCATION/TRAINING PROGRAM

## 2023-10-20 VITALS
WEIGHT: 129 LBS | BODY MASS INDEX: 26 KG/M2 | RESPIRATION RATE: 16 BRPM | HEIGHT: 59 IN | HEART RATE: 80 BPM | TEMPERATURE: 99.6 F

## 2023-10-20 NOTE — PROGRESS NOTES
Patient here for PAT with daughter Devi Olson. Neuro & Nephrology clearances are done and in chart. Still need cardiac & medical. Echo is scheduled for next week. Instructed daughter to arrange dialysis to be done the day prior to surgery, verbalizes understanding.

## 2023-10-20 NOTE — DISCHARGE INSTRUCTIONS
by your surgeon and check in at the desk. If you have a living will or healthcare power of , please bring a copy. You will be taken to the pre-op holding area where you will be prepared for surgery. A physical assessment will be performed by a nurse practitioner or house officer. Your IV will be started and you will meet your anesthesiologist.    When you go to surgery, your family will be directed to the surgical waiting room, where the doctor should speak with them after your surgery. After surgery, you will be taken to the recovery area. When you are alert and stable, you will receive instructions and be prepared for discharge. If you use a Bi-PAP or C-PAP machine, please bring it with you and leave it in the car in case it is needed in recovery room.

## 2023-10-22 DIAGNOSIS — E78.5 HYPERLIPIDEMIA WITH TARGET LDL LESS THAN 70: ICD-10-CM

## 2023-10-22 DIAGNOSIS — K21.9 GASTROESOPHAGEAL REFLUX DISEASE, UNSPECIFIED WHETHER ESOPHAGITIS PRESENT: ICD-10-CM

## 2023-10-22 DIAGNOSIS — Z99.2 HYPERTENSIVE CKD, ESRD ON DIALYSIS (HCC): ICD-10-CM

## 2023-10-22 DIAGNOSIS — J44.9 CHRONIC OBSTRUCTIVE PULMONARY DISEASE, UNSPECIFIED COPD TYPE (HCC): ICD-10-CM

## 2023-10-22 DIAGNOSIS — I25.10 CORONARY ARTERY DISEASE INVOLVING NATIVE CORONARY ARTERY OF NATIVE HEART WITHOUT ANGINA PECTORIS: ICD-10-CM

## 2023-10-22 DIAGNOSIS — F51.04 PSYCHOPHYSIOLOGICAL INSOMNIA: ICD-10-CM

## 2023-10-22 DIAGNOSIS — R56.9 SEIZURE-LIKE ACTIVITY (HCC): ICD-10-CM

## 2023-10-22 DIAGNOSIS — I12.0 HYPERTENSIVE CKD, ESRD ON DIALYSIS (HCC): ICD-10-CM

## 2023-10-22 DIAGNOSIS — N18.6 HYPERTENSIVE CKD, ESRD ON DIALYSIS (HCC): ICD-10-CM

## 2023-10-23 ENCOUNTER — PATIENT MESSAGE (OUTPATIENT)
Dept: FAMILY MEDICINE CLINIC | Age: 73
End: 2023-10-23

## 2023-10-23 DIAGNOSIS — K21.9 GASTROESOPHAGEAL REFLUX DISEASE, UNSPECIFIED WHETHER ESOPHAGITIS PRESENT: ICD-10-CM

## 2023-10-23 RX ORDER — OMEPRAZOLE 20 MG/1
CAPSULE, DELAYED RELEASE ORAL
Qty: 90 CAPSULE | Refills: 0 | Status: SHIPPED | OUTPATIENT
Start: 2023-10-23 | End: 2023-10-27 | Stop reason: HOSPADM

## 2023-10-23 RX ORDER — FUROSEMIDE 80 MG
80 TABLET ORAL EVERY OTHER DAY
Qty: 90 TABLET | Refills: 3 | Status: SHIPPED | OUTPATIENT
Start: 2023-10-23

## 2023-10-23 RX ORDER — ATORVASTATIN CALCIUM 40 MG/1
40 TABLET, FILM COATED ORAL EVERY EVENING
Qty: 90 TABLET | Refills: 3 | Status: SHIPPED | OUTPATIENT
Start: 2023-10-23

## 2023-10-23 RX ORDER — FLUTICASONE PROPIONATE AND SALMETEROL XINAFOATE 115; 21 UG/1; UG/1
2 AEROSOL, METERED RESPIRATORY (INHALATION) 2 TIMES DAILY
Qty: 36 G | Refills: 3 | Status: SHIPPED | OUTPATIENT
Start: 2023-10-23

## 2023-10-23 RX ORDER — CHOLECALCIFEROL (VITAMIN D3) 125 MCG
5 CAPSULE ORAL EVERY EVENING
Qty: 90 TABLET | Refills: 3 | Status: SHIPPED | OUTPATIENT
Start: 2023-10-23

## 2023-10-23 RX ORDER — ASPIRIN 81 MG/1
81 TABLET ORAL DAILY
Qty: 90 TABLET | Refills: 3 | Status: SHIPPED | OUTPATIENT
Start: 2023-10-23

## 2023-10-24 NOTE — TELEPHONE ENCOUNTER
E-scribe requesting refill for Lamictal. Please review and e-scribe if applicable.      Last Visit Date: 9/6/2023    Next Visit Date:  Future Appointments   Date Time Provider [address]   10/25/2023  3:00 PM STV ECHO 1 [facility] AD[facility] Radiolog   10/28/2023  5:15 PM [facility] MRI  ST[facility] MRI [facility] Radiolog   11/6/2023  2:00 PM Tayler Shields MD [facility]ND [facility]   12/1/2023  3:30 PM Madison Wise MD [facility]   12/6/2023  1:00 PM DO Brittany Ingram [facility] Neurology -   12/11/2023  3:30 PM DO Benny Barajas Neuro [facility]

## 2023-10-25 ENCOUNTER — HOSPITAL ENCOUNTER (OUTPATIENT)
Age: 73
Discharge: HOME | End: 2023-10-27
Attending: INTERNAL MEDICINE
Payer: MEDICARE

## 2023-10-25 DIAGNOSIS — R78.81 BACTEREMIA: ICD-10-CM

## 2023-10-25 DIAGNOSIS — I38 ENDOCARDITIS, UNSPECIFIED CHRONICITY, UNSPECIFIED ENDOCARDITIS TYPE: ICD-10-CM

## 2023-10-25 LAB
ECHO AO ROOT DIAM: 2.8 CM
ECHO AV AREA PEAK VELOCITY: 1.4 CM2
ECHO AV AREA VTI: 1.8 CM2
ECHO AV MEAN GRADIENT: 6 MMHG
ECHO AV MEAN VELOCITY: 1.2 M/S
ECHO AV PEAK GRADIENT: 14 MMHG
ECHO AV PEAK VELOCITY: 1.9 M/S
ECHO AV VELOCITY RATIO: 0.42
ECHO AV VTI: 33.6 CM
ECHO LA AREA 4C: 21.2 CM2
ECHO LA DIAMETER: 5 CM
ECHO LA MAJOR AXIS: 5.7 CM
ECHO LA TO AORTIC ROOT RATIO: 1.79
ECHO LA VOL 4C: 61 ML (ref 22–52)
ECHO LV EDV A4C: 102 ML
ECHO LV EJECTION FRACTION A4C: 41 %
ECHO LV ESV A4C: 61 ML
ECHO LV FRACTIONAL SHORTENING: 21 % (ref 28–44)
ECHO LV INTERNAL DIMENSION DIASTOLIC: 4.3 CM (ref 3.9–5.3)
ECHO LV INTERNAL DIMENSION SYSTOLIC: 3.4 CM
ECHO LV IVSD: 1.4 CM (ref 0.6–0.9)
ECHO LV MASS 2D: 219.8 G (ref 67–162)
ECHO LV POSTERIOR WALL DIASTOLIC: 1.3 CM (ref 0.6–0.9)
ECHO LV RELATIVE WALL THICKNESS RATIO: 0.6
ECHO LVOT AREA: 3.1 CM2
ECHO LVOT AV VTI INDEX: 0.56
ECHO LVOT DIAM: 2 CM
ECHO LVOT MEAN GRADIENT: 2 MMHG
ECHO LVOT PEAK GRADIENT: 3 MMHG
ECHO LVOT PEAK VELOCITY: 0.8 M/S
ECHO LVOT SV: 58.7 ML
ECHO LVOT VTI: 18.7 CM
ECHO MV A VELOCITY: 0.75 M/S
ECHO MV AREA VTI: 3 CM2
ECHO MV E DECELERATION TIME (DT): 160 MS
ECHO MV E VELOCITY: 1.28 M/S
ECHO MV E/A RATIO: 1.71
ECHO MV LVOT VTI INDEX: 1.06
ECHO MV MAX VELOCITY: 1.6 M/S
ECHO MV MEAN GRADIENT: 5 MMHG
ECHO MV MEAN VELOCITY: 1 M/S
ECHO MV PEAK GRADIENT: 10 MMHG
ECHO MV VTI: 19.9 CM

## 2023-10-25 PROCEDURE — 93325 DOPPLER ECHO COLOR FLOW MAPG: CPT | Performed by: INTERNAL MEDICINE

## 2023-10-25 PROCEDURE — 93325 DOPPLER ECHO COLOR FLOW MAPG: CPT

## 2023-10-25 PROCEDURE — 93308 TTE F-UP OR LMTD: CPT | Performed by: INTERNAL MEDICINE

## 2023-10-25 PROCEDURE — 93321 DOPPLER ECHO F-UP/LMTD STD: CPT | Performed by: INTERNAL MEDICINE

## 2023-10-25 RX ORDER — LAMOTRIGINE 25 MG/1
25 TABLET ORAL 2 TIMES DAILY
Qty: 30 TABLET | Refills: 3 | Status: SHIPPED | OUTPATIENT
Start: 2023-10-25

## 2023-10-27 RX ORDER — FAMOTIDINE 20 MG/1
20 TABLET, FILM COATED ORAL
Qty: 90 TABLET | Refills: 3 | Status: SHIPPED | OUTPATIENT
Start: 2023-10-27

## 2023-10-27 RX ORDER — FAMOTIDINE 20 MG/1
20 TABLET, FILM COATED ORAL DAILY
Qty: 30 TABLET | Refills: 0 | Status: SHIPPED | OUTPATIENT
Start: 2023-10-27

## 2023-10-27 NOTE — TELEPHONE ENCOUNTER
From: Tonia Montserrataurelio Ojeda  Sent: 10/27/2023 3:00 PM EDT  To: [address] Clinical Support Pool  Subject: Omeprazole change    Yes, please send to Massimo Zoe Moreno by mail. Thank you.

## 2023-10-30 ENCOUNTER — TELEPHONE (OUTPATIENT)
Dept: FAMILY MEDICINE CLINIC | Age: 73
End: 2023-10-30

## 2023-10-30 NOTE — TELEPHONE ENCOUNTER
Received call from [facility] stating that chart  notes from 10/16/23 need to specify if patients wounds have been debrided. If they have not been debrided then patients insurance will not cover script for wound supplies. Please advise.      Fax: [phone]

## 2023-10-31 ENCOUNTER — APPOINTMENT (OUTPATIENT)
Dept: INTERVENTIONAL RADIOLOGY/VASCULAR | Age: 73
End: 2023-10-31
Payer: MEDICARE

## 2023-10-31 ENCOUNTER — HOSPITAL ENCOUNTER (EMERGENCY)
Age: 73
Discharge: HOME | End: 2023-10-31
Attending: EMERGENCY MEDICINE
Payer: MEDICARE

## 2023-10-31 VITALS
SYSTOLIC BLOOD PRESSURE: 131 MMHG | WEIGHT: 180.34 LBS | BODY MASS INDEX: 36.36 KG/M2 | RESPIRATION RATE: 18 BRPM | TEMPERATURE: 98.5 F | HEIGHT: 59 IN | DIASTOLIC BLOOD PRESSURE: 66 MMHG | OXYGEN SATURATION: 99 % | HEART RATE: 95 BPM

## 2023-10-31 DIAGNOSIS — T82.9XXA COMPLICATION ASSOCIATED WITH DIALYSIS CATHETER: Primary | ICD-10-CM

## 2023-10-31 LAB
ANION GAP SERPL CALCULATED.3IONS-SCNC: 14 MMOL/L (ref 9–17)
BUN SERPL-MCNC: 41 MG/DL (ref 8–23)
CALCIUM SERPL-MCNC: 8.7 MG/DL (ref 8.6–10.4)
CHLORIDE SERPL-SCNC: 96 MMOL/L (ref 98–107)
CO2 SERPL-SCNC: 30 MMOL/L (ref 20–31)
CREAT SERPL-MCNC: 5 MG/DL (ref 0.5–0.9)
GFR SERPL CREATININE-BSD FRML MDRD: 9 ML/MIN/1.73M2
GLUCOSE SERPL-MCNC: 113 MG/DL (ref 70–99)
POTASSIUM SERPL-SCNC: 4.5 MMOL/L (ref 3.7–5.3)
SODIUM SERPL-SCNC: 140 MMOL/L (ref 135–144)

## 2023-10-31 PROCEDURE — 93005 ELECTROCARDIOGRAM TRACING: CPT

## 2023-10-31 PROCEDURE — 96374 THER/PROPH/DIAG INJ IV PUSH: CPT

## 2023-10-31 PROCEDURE — 6360000002 HC RX W HCPCS: Performed by: RADIOLOGY

## 2023-10-31 PROCEDURE — 36581 REPLACE TUNNELED CV CATH: CPT

## 2023-10-31 PROCEDURE — 6360000002 HC RX W HCPCS: Performed by: PHYSICIAN ASSISTANT

## 2023-10-31 PROCEDURE — 77001 FLUOROGUIDE FOR VEIN DEVICE: CPT

## 2023-10-31 PROCEDURE — 99284 EMERGENCY DEPT VISIT MOD MDM: CPT

## 2023-10-31 PROCEDURE — 80048 BASIC METABOLIC PNL TOTAL CA: CPT

## 2023-10-31 PROCEDURE — C1769 GUIDE WIRE: HCPCS

## 2023-10-31 PROCEDURE — 2580000003 HC RX 258: Performed by: RADIOLOGY

## 2023-10-31 RX ORDER — HEPARIN SODIUM 1000 [USP'U]/ML
INJECTION, SOLUTION INTRAVENOUS; SUBCUTANEOUS PRN
Status: COMPLETED | OUTPATIENT
Start: 2023-10-31 | End: 2023-10-31

## 2023-10-31 RX ADMIN — HEPARIN SODIUM 1600 UNITS: 1000 INJECTION, SOLUTION INTRAVENOUS; SUBCUTANEOUS at 17:17

## 2023-10-31 RX ADMIN — HEPARIN SODIUM 1600 UNITS: 1000 INJECTION, SOLUTION INTRAVENOUS; SUBCUTANEOUS at 17:16

## 2023-10-31 RX ADMIN — VANCOMYCIN HYDROCHLORIDE 1000 MG: 1 INJECTION, POWDER, LYOPHILIZED, FOR SOLUTION INTRAVENOUS at 17:50

## 2023-10-31 ASSESSMENT — PAIN - FUNCTIONAL ASSESSMENT: PAIN_FUNCTIONAL_ASSESSMENT: NONE - DENIES PAIN

## 2023-10-31 NOTE — BRIEF OP NOTE
Brief Postoperative Note    Tess Kent  YOB: 1950  [id number]    Pre-operative Diagnosis: Dislodged dialysis catheter    Post-operative Diagnosis: Same    Procedure: Tunneled HD Catheter Exchange    Anesthesia: Local 2% Lidocaine    Medications Given: none    Surgeons/Assistants: SEBASTIAN Magaña    Estimated Blood Loss: Minimal    Complications: none    Specimens: were not obtained    Tunneled HD catheter exchanged for new 14Fr x 19cm tip to cuff Palindrome HD catheter. Dressing applied. May use HD catheter for dialysis. Catheter locked with heparin. Vitals signs were reviewed and were stable after the procedure.       Electronically signed by SEBASTIAN Lei on 10/31/2023 at 5:15 PM

## 2023-10-31 NOTE — DISCHARGE INSTRUCTIONS
You are seen here for a dislodged dialysis catheter. We have replaced it with interventional radiology. You have follow-up with dialysis tomorrow morning. Please make your appointment. Please return to the emergency department for any new or worsening symptoms, or if you are unable to make dialysis tomorrow and feel that you need to be seen. Please follow-up with your primary care provider.

## 2023-10-31 NOTE — ED NOTES
HD RN called and states pt has make up HD scheduled for tomorrow AM at 1100.  Family and pt notified      Leanne Dobbins RN  10/31/23 [phone]

## 2023-10-31 NOTE — ED NOTES
Pt to ED via self/daughter and son with c/o vascular access problem  Pt has tunneled cath to right side of chest   States she was getting dressed when she believes she pulled the catheter out  States she went to dialysis today and they refused treatment, stated she needs to be evaluated in ED  Denies missing any HD   Denies any other concerns  Pt is a/ox4, RR even and non labored, call light in reach      Marino Luna RN  10/31/23 9532

## 2023-10-31 NOTE — ED NOTES
The following labs were labeled with appropriate pt sticker and tubed to lab:     [x] Blue     [x] Lavender   [] on ice  [x] Green/yellow  [x] Green/black [] on ice  [] Marissa Ruby  [] on ice  [] Yellow  [] Red  [] Pink  [] Type/ Screen  [] ABG  [] VBG    [] COVID-19 swab    [] Rapid  [] PCR  [] Flu swab  [] Peds Viral Panel     [] Urine Sample  [] Fecal Sample  [] Pelvic Cultures  [] Blood Cultures  [] X 2  [] STREP Cultures       Doretha Echeverria RN  10/31/23 9863

## 2023-11-01 ENCOUNTER — HOSPITAL ENCOUNTER (OUTPATIENT)
Dept: NUCLEAR MEDICINE | Age: 73
Discharge: HOME | End: 2023-11-03
Attending: INTERNAL MEDICINE
Payer: MEDICARE

## 2023-11-01 DIAGNOSIS — Z95.1 S/P CABG (CORONARY ARTERY BYPASS GRAFT): ICD-10-CM

## 2023-11-01 DIAGNOSIS — I50.20: ICD-10-CM

## 2023-11-01 LAB
EKG ATRIAL RATE: 357 BPM
EKG Q-T INTERVAL: 372 MS
EKG QRS DURATION: 94 MS
EKG QTC CALCULATION (BAZETT): 426 MS
EKG R AXIS: 26 DEGREES
EKG T AXIS: -38 DEGREES
EKG VENTRICULAR RATE: 79 BPM

## 2023-11-01 PROCEDURE — 93017 CV STRESS TEST TRACING ONLY: CPT

## 2023-11-01 PROCEDURE — A9560 TC99M LABELED RBC: HCPCS | Performed by: INTERNAL MEDICINE

## 2023-11-01 PROCEDURE — 93010 ELECTROCARDIOGRAM REPORT: CPT | Performed by: INTERNAL MEDICINE

## 2023-11-01 PROCEDURE — 78473 GATED HEART MULTIPLE: CPT

## 2023-11-01 PROCEDURE — 3430000000 HC RX DIAGNOSTIC RADIOPHARMACEUTICAL: Performed by: INTERNAL MEDICINE

## 2023-11-01 PROCEDURE — 2580000003 HC RX 258: Performed by: INTERNAL MEDICINE

## 2023-11-01 RX ORDER — SODIUM CHLORIDE 0.9 % (FLUSH) 0.9 %
10 SYRINGE (ML) INJECTION PRN
Status: DISCONTINUED | OUTPATIENT
Start: 2023-11-01 | End: 2023-11-04 | Stop reason: HOSPADM

## 2023-11-01 RX ADMIN — Medication 23.3 MILLICURIE: at 11:34

## 2023-11-01 RX ADMIN — SODIUM CHLORIDE, PRESERVATIVE FREE 10 ML: 5 INJECTION INTRAVENOUS at 11:34

## 2023-11-03 LAB — STRESS TARGET HR: 146 BPM

## 2023-11-04 NOTE — DISCHARGE INSTRUCTIONS
[facility] and HYPERBARIC TREATMENT  CENTER                            Visit  Discharge Instructions / Physician Orders  DATE: 11/6/2023     Home Care: none     SUPPLIES ORDERED THRU: [employer]                  DATE LAST SUPPLIED 10/18/23     Wound Location:  coccyx     Cleanse with: Liquid antibacterial soap and water, rinse well      Dressing Orders:  Primary dressing       lori,          Secondary dressing    4x4 zetivite bordered silicone dressing daily                       secure with           x 30days     Frequency:  daily     Additional Orders: Increase protein to diet (meat, cheese, eggs, fish, peanut butter, nuts and beans)  Multivitamin daily     OFFLOADING [x] YES  TYPE:                  [] NA      Gel cushion for bed, Patient Refuses      Weekly wound care visits until determined otherwise. Antibiotic therapy-wound care related YES [] NO [] NA[x]     MY CHART []     Smart Device  []      HYPERBARIC TREATMENT-                TREATMENT #                          Your next appointment with the [address] [place] [address] is in 2 weeks 11/20/23 12:45 pm with Dr. Jim Churchill                                                                                                   (Please note your next appointment above and if you are unable to keep, kindly give a 24 hour notice. Thank you.)  If more than 15 min late we cannot guarantee you will be seen due to clinician schedule  Per Policy, Excessive cancellation will call for dismissal from program.  If you experience any of the following, please call the [address] [place] [address] during business hours:  [phone]     * Increase in Pain  * Temperature over 101  * Increase in drainage from your wound  * Drainage with a foul odor  * Bleeding  * Increase in swelling  * Need for compression bandage changes due to slippage, breakthrough drainage.      If you need medical attention outside of the business hours of the [address] [place] [address] please contact your PCP or go to

## 2023-11-06 ENCOUNTER — HOSPITAL ENCOUNTER (OUTPATIENT)
Dept: WOUND CARE | Age: 73
Discharge: HOME | End: 2023-11-06
Payer: MEDICARE

## 2023-11-06 ENCOUNTER — TELEMEDICINE (OUTPATIENT)
Dept: PRIMARY CARE CLINIC | Age: 73
End: 2023-11-06
Payer: MEDICARE

## 2023-11-06 VITALS
SYSTOLIC BLOOD PRESSURE: 164 MMHG | RESPIRATION RATE: 17 BRPM | TEMPERATURE: 98.4 F | HEIGHT: 59 IN | BODY MASS INDEX: 26.21 KG/M2 | WEIGHT: 130 LBS | HEART RATE: 99 BPM | DIASTOLIC BLOOD PRESSURE: 50 MMHG

## 2023-11-06 DIAGNOSIS — Z74.09 DECREASED STRENGTH, ENDURANCE, AND MOBILITY: ICD-10-CM

## 2023-11-06 DIAGNOSIS — J44.1 CHRONIC OBSTRUCTIVE PULMONARY DISEASE WITH ACUTE EXACERBATION (HCC): ICD-10-CM

## 2023-11-06 DIAGNOSIS — R53.1 DECREASED STRENGTH, ENDURANCE, AND MOBILITY: ICD-10-CM

## 2023-11-06 DIAGNOSIS — Z99.2 TYPE 2 DIABETES MELLITUS WITH CHRONIC KIDNEY DISEASE ON CHRONIC DIALYSIS, WITHOUT LONG-TERM CURRENT USE OF INSULIN (HCC): ICD-10-CM

## 2023-11-06 DIAGNOSIS — E11.22 TYPE 2 DIABETES MELLITUS WITH CHRONIC KIDNEY DISEASE ON CHRONIC DIALYSIS, WITHOUT LONG-TERM CURRENT USE OF INSULIN (HCC): ICD-10-CM

## 2023-11-06 DIAGNOSIS — L89.153 PRESSURE ULCER OF COCCYGEAL REGION, STAGE III (HCC): Primary | ICD-10-CM

## 2023-11-06 DIAGNOSIS — N18.6 TYPE 2 DIABETES MELLITUS WITH CHRONIC KIDNEY DISEASE ON CHRONIC DIALYSIS, WITHOUT LONG-TERM CURRENT USE OF INSULIN (HCC): ICD-10-CM

## 2023-11-06 DIAGNOSIS — R68.89 DECREASED STRENGTH, ENDURANCE, AND MOBILITY: ICD-10-CM

## 2023-11-06 DIAGNOSIS — R05.1 ACUTE COUGH: Primary | ICD-10-CM

## 2023-11-06 PROCEDURE — 99213 OFFICE O/P EST LOW 20 MIN: CPT | Performed by: NURSE PRACTITIONER

## 2023-11-06 PROCEDURE — 11042 DBRDMT SUBQ TIS 1ST 20SQCM/<: CPT | Performed by: PLASTIC SURGERY

## 2023-11-06 PROCEDURE — G8399 PT W/DXA RESULTS DOCUMENT: HCPCS | Performed by: NURSE PRACTITIONER

## 2023-11-06 PROCEDURE — 1090F PRES/ABSN URINE INCON ASSESS: CPT | Performed by: NURSE PRACTITIONER

## 2023-11-06 PROCEDURE — G8427 DOCREV CUR MEDS BY ELIG CLIN: HCPCS | Performed by: NURSE PRACTITIONER

## 2023-11-06 PROCEDURE — 1123F ACP DISCUSS/DSCN MKR DOCD: CPT | Performed by: NURSE PRACTITIONER

## 2023-11-06 PROCEDURE — 3017F COLORECTAL CA SCREEN DOC REV: CPT | Performed by: NURSE PRACTITIONER

## 2023-11-06 RX ORDER — LIDOCAINE HYDROCHLORIDE 40 MG/ML
SOLUTION TOPICAL ONCE
OUTPATIENT
Start: 2023-11-06 | End: 2023-11-06

## 2023-11-06 RX ORDER — LIDOCAINE HYDROCHLORIDE 40 MG/ML
SOLUTION TOPICAL ONCE
Status: COMPLETED | OUTPATIENT
Start: 2023-11-06 | End: 2023-11-06

## 2023-11-06 RX ORDER — OMEPRAZOLE 20 MG/1
20 CAPSULE, DELAYED RELEASE ORAL DAILY
COMMUNITY

## 2023-11-06 RX ORDER — LIDOCAINE HYDROCHLORIDE 20 MG/ML
JELLY TOPICAL ONCE
OUTPATIENT
Start: 2023-11-06 | End: 2023-11-06

## 2023-11-06 RX ORDER — BENZONATATE 100 MG/1
100 CAPSULE ORAL 3 TIMES DAILY PRN
Qty: 30 CAPSULE | Refills: 0 | Status: SHIPPED | OUTPATIENT
Start: 2023-11-06 | End: 2023-11-16

## 2023-11-06 RX ADMIN — LIDOCAINE HYDROCHLORIDE 5 ML: 40 SOLUTION TOPICAL at 14:37

## 2023-11-06 ASSESSMENT — PAIN SCALES - GENERAL: PAINLEVEL_OUTOF10: 0

## 2023-11-06 ASSESSMENT — PAIN DESCRIPTION - ORIENTATION: ORIENTATION: MID

## 2023-11-06 ASSESSMENT — ENCOUNTER SYMPTOMS
COUGH: 1
SHORTNESS OF BREATH: 0
WHEEZING: 0

## 2023-11-06 NOTE — PROGRESS NOTES
Farhat Judd, was evaluated through a synchronous (real-time) audio-video encounter. The patient (or guardian if applicable) is aware that this is a billable service, which includes applicable co-pays. This Virtual Visit was conducted with patient's (and/or legal guardian's) consent. Patient identification was verified, and a caregiver was present when appropriate. The patient was located at Home: [facility] [place] [address]  Provider was located at Home ([facility]): Rut Murray (:  1950) is a Established patient, presenting virtually for evaluation of the following:  Productive cough with yellow-green sputum for the past several days Has not done an at-home COVID test    Assessment & Plan   Below is the assessment and plan developed based on review of pertinent history, physical exam, labs, studies, and medications. 1. Acute cough  Problem  -     benzonatate (TESSALON) 100 MG capsule; Take 1 capsule by mouth 3 times daily as needed for Cough, Disp-30 capsule, R-0Normal  See patient instructions    2. Chronic obstructive pulmonary disease with acute exacerbation (HCC)  Problem  -     benzonatate (TESSALON) 100 MG capsule; Take 1 capsule by mouth 3 times daily as needed for Cough, Disp-30 capsule, R-0Normal  Continue daily inhalers along with rescue inhaler as directed  See patient instructions    Daughter was instructed that any severe shortness of breath or wheezing or difficulty breathing go to the ER  She will need a follow-up visit next week if her symptoms do not improve or if they worsen. She will need an office evaluation so someone can evaluate her lungs    The patient would benefit from future follow up with their usual PCP. As of the end of their Virtualist Visit today, follow up visit status is as follows:  No PCP availability           Subjective   This is a 78-year-old female patient along with her daughter Silvia Hoover consenting to a virtual visit  Patient is sukhwinder

## 2023-11-06 NOTE — PROGRESS NOTES
reviewed. Constitutional: Oriented to person, place, and time. Appears well-developed and well-nourished. No distress. Head: Normocephalic and atraumatic. Eyes: Conjunctivae and EOM are normal.   Pulmonary/Chest: Effort normal. No respiratory distress. Neurological: Alert and oriented to person, place, and time. Skin: Skin is warm and dry. No rash noted. Psychiatric: Normal mood and affect. Behavior is normal            Post Debridement Measurements:  Wound/Ulcer Descriptions are Pre Debridement except measurements:    Wound 10/18/23 Coccyx wound #1 coccyx (Active)   Wound Image   10/18/23 1328   Wound Etiology Pressure Stage 3 11/06/23 1434   Dressing Status Old drainage noted;New drainage noted 11/06/23 1434   Wound Cleansed Cleansed with saline 11/06/23 1434   Dressing/Treatment Other (comment) 10/18/23 1400   Wound Length (cm) 1 cm 11/06/23 1434   Wound Width (cm) 0.8 cm 11/06/23 1434   Wound Depth (cm) 0.2 cm 11/06/23 1434   Wound Surface Area (cm^2) 0.8 cm^2 11/06/23 1434   Change in Wound Size % (l*w) 38.46 11/06/23 1434   Wound Volume (cm^3) 0.16 cm^3 11/06/23 1434   Wound Healing % 38 11/06/23 1434   Post-Procedure Length (cm) 1.3 cm 10/18/23 1328   Post-Procedure Width (cm) 1 cm 10/18/23 1328   Post-Procedure Depth (cm) 0.2 cm 10/18/23 1328   Post-Procedure Surface Area (cm^2) 1.3 cm^2 10/18/23 1328   Post-Procedure Volume (cm^3) 0.26 cm^3 10/18/23 1328   Wound Assessment Pink/red 11/06/23 1434   Drainage Amount Moderate (25-50%) 11/06/23 1434   Drainage Description Serosanguinous; Yellow 11/06/23 1434   Odor None 11/06/23 1434   Marian-wound Assessment Blanchable erythema 11/06/23 1434   Margins Defined edges 11/06/23 1434   Number of days: 19          Procedure Note  Indications:  Based on my examination of this patient's wound(s)/ulcer(s) today, debridement is required to promote healing and evaluate the wound base.     Performed by: Alejandro Resendiz MD    Consent obtained:  Yes    Time out taken:

## 2023-11-06 NOTE — PLAN OF CARE
Problem: Chronic Conditions and Co-morbidities  Goal: Patient's chronic conditions and co-morbidity symptoms are monitored and maintained or improved  Outcome: Progressing     Problem: Cognitive:  Goal: Knowledge of wound care  Description: Knowledge of wound care  Outcome: Progressing  Goal: Understands risk factors for wounds  Description: Understands risk factors for wounds  Outcome: Progressing     Problem: Pressure Ulcer:  Goal: Signs of wound healing will improve  Description: Signs of wound healing will improve  Outcome: Progressing  Goal: Absence of new pressure ulcer  Description: Absence of new pressure ulcer  Outcome: Progressing     Problem: Falls - Risk of:  Goal: Will remain free from falls  Description: Will remain free from falls  Outcome: Progressing

## 2023-11-07 ENCOUNTER — TELEPHONE (OUTPATIENT)
Dept: FAMILY MEDICINE CLINIC | Age: 73
End: 2023-11-07

## 2023-11-07 DIAGNOSIS — J20.9 ACUTE BRONCHITIS DUE TO INFECTION: ICD-10-CM

## 2023-11-07 DIAGNOSIS — J44.9 CHRONIC OBSTRUCTIVE PULMONARY DISEASE, UNSPECIFIED COPD TYPE (HCC): Primary | ICD-10-CM

## 2023-11-07 NOTE — TELEPHONE ENCOUNTER
Please schedule the patient November 17 at the end of the schedule    I will also order follow-up chest x-ray, to do chest x-ray anytime         Diagnosis Orders   1.  Chronic obstructive pulmonary disease, unspecified COPD type ([address] )  XR CHEST (2 VW)      2. Acute bronchitis due to infection  XR CHEST (2 VW)           Future Appointments   Date Time Provider [address]   11/16/2023  4:45 PM [facility] MRI  STCZ MRI [facility] Radiolog   11/20/2023 12:45 PM Andrés Cooper MD [facility]   12/1/2023  3:30 PM Dominique Salazar MD [facility]   12/6/2023  1:00 PM Luigi Sender, DO [facility] Neurology -   12/11/2023  3:30 PM [facility]

## 2023-11-07 NOTE — TELEPHONE ENCOUNTER
----- Message from Cori Lawrence sent at 11/7/2023  9:20 AM EST -----  Subject: Appointment Request    Reason for Call: Established Patient Appointment needed: Routine Existing   Condition Follow Up    QUESTIONS    Reason for appointment request? Available appointments did not meet   patient need     Additional Information for Provider? Pt was seen through a VV on 11/06/23   for Coughing last 2 weeks with mucus (tint yellow or green), Pt have COPD,   Pt seen Jordin Joshua, Pt was told to return within a wk for a F/U   to make sure her lungs and everything is clear. Pt would like to schedule   that F/U with PCP or Dr. Coleman Antony for either the 8th, 15th, 17th, or the   24th, in the afternoon if possible in the [facility] Per PCP request to check   and make sure no fluids are on Pt's lungs.  Please contact to schedule F/U   appt.  ---------------------------------------------------------------------------  --------------  [facility] INFO  [phone]; OK to leave message on voicemail,Do not leave any message,   patient will call back for answer,OK to respond with electronic message   via [employer] portal (only for patients who have registered [employer] account)  ---------------------------------------------------------------------------  --------------  SCRIPT ANSWERS

## 2023-11-08 NOTE — TELEPHONE ENCOUNTER
Spopke with daughter and added pt to end of day on 11/17 per providers request, writer also adv chest x-ray order has been placed

## 2023-11-16 ENCOUNTER — HOSPITAL ENCOUNTER (OUTPATIENT)
Dept: GENERAL RADIOLOGY | Age: 73
Discharge: HOME | End: 2023-11-18
Attending: FAMILY MEDICINE
Payer: MEDICARE

## 2023-11-16 ENCOUNTER — HOSPITAL ENCOUNTER (OUTPATIENT)
Age: 73
Discharge: HOME | End: 2023-11-18
Attending: NEUROLOGICAL SURGERY
Payer: MEDICARE

## 2023-11-16 ENCOUNTER — HOSPITAL ENCOUNTER (OUTPATIENT)
Dept: MRI IMAGING | Age: 73
Discharge: HOME | End: 2023-11-18
Attending: NEUROLOGICAL SURGERY
Payer: MEDICARE

## 2023-11-16 DIAGNOSIS — J20.9 ACUTE BRONCHITIS DUE TO INFECTION: ICD-10-CM

## 2023-11-16 DIAGNOSIS — J44.9 CHRONIC OBSTRUCTIVE PULMONARY DISEASE, UNSPECIFIED COPD TYPE (HCC): ICD-10-CM

## 2023-11-16 DIAGNOSIS — D32.9 MENINGIOMA (HCC): ICD-10-CM

## 2023-11-16 LAB — STRESS TARGET HR: 146 BPM

## 2023-11-16 PROCEDURE — 71046 X-RAY EXAM CHEST 2 VIEWS: CPT

## 2023-11-17 ENCOUNTER — OFFICE VISIT (OUTPATIENT)
Dept: FAMILY MEDICINE CLINIC | Age: 73
End: 2023-11-17
Payer: MEDICARE

## 2023-11-17 VITALS
WEIGHT: 129 LBS | TEMPERATURE: 97 F | OXYGEN SATURATION: 94 % | HEIGHT: 59 IN | DIASTOLIC BLOOD PRESSURE: 82 MMHG | BODY MASS INDEX: 26 KG/M2 | HEART RATE: 64 BPM | SYSTOLIC BLOOD PRESSURE: 134 MMHG

## 2023-11-17 DIAGNOSIS — Z99.2 HYPERTENSIVE CKD, ESRD ON DIALYSIS (HCC): ICD-10-CM

## 2023-11-17 DIAGNOSIS — J90 BILATERAL PLEURAL EFFUSION: ICD-10-CM

## 2023-11-17 DIAGNOSIS — F01.B4 MODERATE VASCULAR DEMENTIA WITH ANXIETY (HCC): ICD-10-CM

## 2023-11-17 DIAGNOSIS — I50.42 CHRONIC COMBINED SYSTOLIC AND DIASTOLIC CHF (CONGESTIVE HEART FAILURE) (HCC): Primary | ICD-10-CM

## 2023-11-17 DIAGNOSIS — J96.11 CHRONIC RESPIRATORY FAILURE WITH HYPOXIA (HCC): ICD-10-CM

## 2023-11-17 DIAGNOSIS — N18.6 HYPERTENSIVE CKD, ESRD ON DIALYSIS (HCC): ICD-10-CM

## 2023-11-17 DIAGNOSIS — I48.19 PERSISTENT ATRIAL FIBRILLATION (HCC): ICD-10-CM

## 2023-11-17 DIAGNOSIS — J44.9 CHRONIC OBSTRUCTIVE PULMONARY DISEASE, UNSPECIFIED COPD TYPE (HCC): ICD-10-CM

## 2023-11-17 DIAGNOSIS — L89.153 PRESSURE INJURY OF COCCYGEAL REGION, STAGE 3 (HCC): ICD-10-CM

## 2023-11-17 DIAGNOSIS — E11.42 TYPE 2 DIABETES MELLITUS WITH POLYNEUROPATHY (HCC): ICD-10-CM

## 2023-11-17 DIAGNOSIS — I12.0 HYPERTENSIVE CKD, ESRD ON DIALYSIS (HCC): ICD-10-CM

## 2023-11-17 PROCEDURE — 90694 VACC AIIV4 NO PRSRV 0.5ML IM: CPT | Performed by: FAMILY MEDICINE

## 2023-11-17 PROCEDURE — 1123F ACP DISCUSS/DSCN MKR DOCD: CPT | Performed by: FAMILY MEDICINE

## 2023-11-17 PROCEDURE — 1036F TOBACCO NON-USER: CPT | Performed by: FAMILY MEDICINE

## 2023-11-17 PROCEDURE — 1090F PRES/ABSN URINE INCON ASSESS: CPT | Performed by: FAMILY MEDICINE

## 2023-11-17 PROCEDURE — G8484 FLU IMMUNIZE NO ADMIN: HCPCS | Performed by: FAMILY MEDICINE

## 2023-11-17 PROCEDURE — 99214 OFFICE O/P EST MOD 30 MIN: CPT | Performed by: FAMILY MEDICINE

## 2023-11-17 PROCEDURE — 3078F DIAST BP <80 MM HG: CPT | Performed by: FAMILY MEDICINE

## 2023-11-17 PROCEDURE — 3023F SPIROM DOC REV: CPT | Performed by: FAMILY MEDICINE

## 2023-11-17 PROCEDURE — 3017F COLORECTAL CA SCREEN DOC REV: CPT | Performed by: FAMILY MEDICINE

## 2023-11-17 PROCEDURE — 2022F DILAT RTA XM EVC RTNOPTHY: CPT | Performed by: FAMILY MEDICINE

## 2023-11-17 PROCEDURE — G0008 ADMIN INFLUENZA VIRUS VAC: HCPCS | Performed by: FAMILY MEDICINE

## 2023-11-17 PROCEDURE — G8399 PT W/DXA RESULTS DOCUMENT: HCPCS | Performed by: FAMILY MEDICINE

## 2023-11-17 PROCEDURE — G8427 DOCREV CUR MEDS BY ELIG CLIN: HCPCS | Performed by: FAMILY MEDICINE

## 2023-11-17 PROCEDURE — G8417 CALC BMI ABV UP PARAM F/U: HCPCS | Performed by: FAMILY MEDICINE

## 2023-11-17 PROCEDURE — 3077F SYST BP >= 140 MM HG: CPT | Performed by: FAMILY MEDICINE

## 2023-11-17 PROCEDURE — 3044F HG A1C LEVEL LT 7.0%: CPT | Performed by: FAMILY MEDICINE

## 2023-11-17 RX ORDER — BENZONATATE 100 MG/1
100 CAPSULE ORAL 3 TIMES DAILY PRN
Qty: 21 CAPSULE | Refills: 0 | Status: SHIPPED | OUTPATIENT
Start: 2023-11-17 | End: 2023-11-24

## 2023-11-17 RX ORDER — PREDNISOLONE ACETATE 10 MG/ML
SUSPENSION/ DROPS OPHTHALMIC
COMMUNITY
Start: 2023-10-27

## 2023-11-17 RX ORDER — MOXIFLOXACIN 5 MG/ML
SOLUTION/ DROPS OPHTHALMIC
COMMUNITY
Start: 2023-10-27

## 2023-11-17 RX ORDER — DESLORATADINE 5 MG/1
5 TABLET ORAL DAILY
COMMUNITY
Start: 2023-10-22

## 2023-11-17 RX ORDER — KETOROLAC TROMETHAMINE 5 MG/ML
SOLUTION OPHTHALMIC
COMMUNITY
Start: 2023-10-27

## 2023-11-17 RX ORDER — MIRTAZAPINE 15 MG/1
15 TABLET, FILM COATED ORAL NIGHTLY
Qty: 90 TABLET | Refills: 3 | Status: SHIPPED | OUTPATIENT
Start: 2023-11-17

## 2023-11-17 RX ORDER — MIRTAZAPINE 15 MG/1
15 TABLET, FILM COATED ORAL NIGHTLY
Qty: 30 TABLET | Refills: 0 | Status: SHIPPED | OUTPATIENT
Start: 2023-11-17

## 2023-11-17 SDOH — ECONOMIC STABILITY: FOOD INSECURITY: WITHIN THE PAST 12 MONTHS, THE FOOD YOU BOUGHT JUST DIDN'T LAST AND YOU DIDN'T HAVE MONEY TO GET MORE.: SOMETIMES TRUE

## 2023-11-17 SDOH — ECONOMIC STABILITY: FOOD INSECURITY: WITHIN THE PAST 12 MONTHS, YOU WORRIED THAT YOUR FOOD WOULD RUN OUT BEFORE YOU GOT MONEY TO BUY MORE.: SOMETIMES TRUE

## 2023-11-17 SDOH — ECONOMIC STABILITY: INCOME INSECURITY: HOW HARD IS IT FOR YOU TO PAY FOR THE VERY BASICS LIKE FOOD, HOUSING, MEDICAL CARE, AND HEATING?: SOMEWHAT HARD

## 2023-11-17 ASSESSMENT — PATIENT HEALTH QUESTIONNAIRE - PHQ9
SUM OF ALL RESPONSES TO PHQ QUESTIONS 1-9: 0
SUM OF ALL RESPONSES TO PHQ QUESTIONS 1-9: 0
SUM OF ALL RESPONSES TO PHQ9 QUESTIONS 1 & 2: 0
1. LITTLE INTEREST OR PLEASURE IN DOING THINGS: 0
SUM OF ALL RESPONSES TO PHQ QUESTIONS 1-9: 0
SUM OF ALL RESPONSES TO PHQ QUESTIONS 1-9: 0
2. FEELING DOWN, DEPRESSED OR HOPELESS: 0

## 2023-11-17 ASSESSMENT — ENCOUNTER SYMPTOMS
ABDOMINAL DISTENTION: 0
SHORTNESS OF BREATH: 1
NAUSEA: 0
VOMITING: 0
CONSTIPATION: 0
COUGH: 0
CHEST TIGHTNESS: 0
DIARRHEA: 0
WHEEZING: 0
ABDOMINAL PAIN: 0

## 2023-11-17 NOTE — RESULT ENCOUNTER NOTE
Please notify patient: Very small pleural effusions, fluid at the bases of the lungs, usually this gets resorbed on its own, due to dialysis, no pneumonia    Future Appointments  11/17/2023 4:00 PM    MD mary Nick sc               Randa Deluna  11/20/2023 12:45 PM   MD MADDIE LingCZ WND CAR        [facility]  12/1/2023  3:30 PM    [facility]  12/6/2023  1:00 PM    Madeline Gayle DO           Neuro [facility]        Neurology -  12/11/2023 3:30 PM    Pastor Bert DO        [facility] Neuro           [facility]

## 2023-11-18 NOTE — DISCHARGE INSTRUCTIONS
[facility] and HYPERBARIC TREATMENT  CENTER                            Visit  Discharge Instructions / Physician Orders  DATE: 11/20/2023     Home Care: none     SUPPLIES ORDERED THRU: [facility]                  DATE LAST SUPPLIED 10/18/23     Wound Location:  coccyx     Cleanse with: Liquid antibacterial soap and water, rinse well      Dressing Orders:  Primary dressing       lori,          Secondary dressing    4x4 zetivite bordered silicone dressing daily                       secure with           x 30days     Frequency:  daily     Additional Orders: Increase protein to diet (meat, cheese, eggs, fish, peanut butter, nuts and beans)  Multivitamin daily     OFFLOADING [x] YES  TYPE:                  [] NA      Gel cushion for bed, Patient Refuses      Weekly wound care visits until determined otherwise. Antibiotic therapy-wound care related YES [] NO [] NA[x]     MY CHART []     Smart Device  []      HYPERBARIC TREATMENT-                TREATMENT #                          Your next appointment with the [address] [place] [address] is in 1 week                                                                                                    (Please note your next appointment above and if you are unable to keep, kindly give a 24 hour notice. Thank you.)  If more than 15 min late we cannot guarantee you will be seen due to clinician schedule  Per Policy, Excessive cancellation will call for dismissal from program.  If you experience any of the following, please call the [address] [place] [address] during business hours:  [phone]     * Increase in Pain  * Temperature over 101  * Increase in drainage from your wound  * Drainage with a foul odor  * Bleeding  * Increase in swelling  * Need for compression bandage changes due to slippage, breakthrough drainage. If you need medical attention outside of the business hours of the [address] [place] [address] please contact your PCP or go to the nearest emergency room.      The

## 2023-11-20 ENCOUNTER — HOSPITAL ENCOUNTER (OUTPATIENT)
Dept: WOUND CARE | Age: 73
Discharge: HOME | End: 2023-11-20
Payer: MEDICARE

## 2023-11-20 VITALS
SYSTOLIC BLOOD PRESSURE: 152 MMHG | TEMPERATURE: 98.2 F | WEIGHT: 129 LBS | BODY MASS INDEX: 26 KG/M2 | HEART RATE: 87 BPM | HEIGHT: 59 IN | DIASTOLIC BLOOD PRESSURE: 71 MMHG | RESPIRATION RATE: 18 BRPM

## 2023-11-20 DIAGNOSIS — Z99.2 TYPE 2 DIABETES MELLITUS WITH CHRONIC KIDNEY DISEASE ON CHRONIC DIALYSIS, WITHOUT LONG-TERM CURRENT USE OF INSULIN (HCC): ICD-10-CM

## 2023-11-20 DIAGNOSIS — Z74.09 DECREASED STRENGTH, ENDURANCE, AND MOBILITY: ICD-10-CM

## 2023-11-20 DIAGNOSIS — L89.153 PRESSURE ULCER OF COCCYGEAL REGION, STAGE III (HCC): Primary | ICD-10-CM

## 2023-11-20 DIAGNOSIS — R68.89 DECREASED STRENGTH, ENDURANCE, AND MOBILITY: ICD-10-CM

## 2023-11-20 DIAGNOSIS — R53.1 DECREASED STRENGTH, ENDURANCE, AND MOBILITY: ICD-10-CM

## 2023-11-20 DIAGNOSIS — E11.22 TYPE 2 DIABETES MELLITUS WITH CHRONIC KIDNEY DISEASE ON CHRONIC DIALYSIS, WITHOUT LONG-TERM CURRENT USE OF INSULIN (HCC): ICD-10-CM

## 2023-11-20 DIAGNOSIS — N18.6 TYPE 2 DIABETES MELLITUS WITH CHRONIC KIDNEY DISEASE ON CHRONIC DIALYSIS, WITHOUT LONG-TERM CURRENT USE OF INSULIN (HCC): ICD-10-CM

## 2023-11-20 PROBLEM — J90 BILIARY PLEURAL EFFUSION: Status: ACTIVE | Noted: 2023-11-20

## 2023-11-20 PROBLEM — I21.4 NSTEMI (NON-ST ELEVATED MYOCARDIAL INFARCTION) (HCC): Status: RESOLVED | Noted: 2023-07-22 | Resolved: 2023-11-20

## 2023-11-20 PROBLEM — I48.0 PAROXYSMAL ATRIAL FIBRILLATION (HCC): Status: RESOLVED | Noted: 2020-07-28 | Resolved: 2023-11-20

## 2023-11-20 PROBLEM — L98.9 SKIN LESION OF FACE: Status: RESOLVED | Noted: 2023-05-10 | Resolved: 2023-11-20

## 2023-11-20 PROCEDURE — 11042 DBRDMT SUBQ TIS 1ST 20SQCM/<: CPT

## 2023-11-20 PROCEDURE — 11042 DBRDMT SUBQ TIS 1ST 20SQCM/<: CPT | Performed by: PLASTIC SURGERY

## 2023-11-20 RX ORDER — LIDOCAINE HYDROCHLORIDE 40 MG/ML
SOLUTION TOPICAL ONCE
Status: COMPLETED | OUTPATIENT
Start: 2023-11-20 | End: 2023-11-20

## 2023-11-20 RX ORDER — LIDOCAINE HYDROCHLORIDE 20 MG/ML
JELLY TOPICAL ONCE
OUTPATIENT
Start: 2023-11-20 | End: 2023-11-20

## 2023-11-20 RX ORDER — LIDOCAINE HYDROCHLORIDE 40 MG/ML
SOLUTION TOPICAL ONCE
OUTPATIENT
Start: 2023-11-20 | End: 2023-11-20

## 2023-11-20 RX ADMIN — LIDOCAINE HYDROCHLORIDE 5 ML: 40 SOLUTION TOPICAL at 13:20

## 2023-11-20 ASSESSMENT — PAIN SCALES - GENERAL: PAINLEVEL_OUTOF10: 0

## 2023-11-20 NOTE — PROGRESS NOTES
contraindication     Review of Systems:   Constitutional: Negative for fever, chills, fatigue and unexpected weight change. HENT: Negative for hearing loss, sore throat and facial swelling. Eyes: Negative for pain and discharge. Respiratory: Patient with a history of asthma.,  Hypoxia and hypercapnia  Cardiovascular: Patient with a history of diastolic heart failure, atrial fib, bradycardia, MI  Gastrointestinal: Negative for nausea, vomiting, diarrhea and constipation. Skin: Negative for pallor and rash. Neurological: Patient with a history of CVA, head contusion  Hematological: Does not bruise/bleed easily. Psychiatric/Behavioral: Negative for behavioral problems. The patient is not nervous/anxious.     : Patient with end-stage renal disease on hemodialysis  Musculoskeletal: Patient with a history of arthritis  Endocrine: Patient has a history of diabetes  Past Medical History:   Diagnosis Date    Acute CVA (cerebrovascular accident) ([address]) 07/25/2020    Acute kidney injury superimposed on CKD ([address]) 06/08/2022    Acute on chronic combined systolic (congestive) and diastolic (congestive) heart failure ([address]) 02/06/2022    Acute respiratory failure with hypoxia and hypercapnia (HCC)     JOY (acute kidney injury) ([address]) 01/15/2022    Arthritis     Asthma     Atrial fibrillation, unspecified type ([address])     Bilateral lower extremity edema 04/20/2022    Bradycardia 06/12/2022    Chronic diastolic congestive heart failure ([address]) 02/20/2020    Chronic ulcer of left leg with fat layer exposed ([address]) 06/21/2022    CKD stage 4 secondary to hypertension ([address]) 02/20/2020    Dependence on renal dialysis ([address])     Tues, Th, Sat    ESRD (end stage renal disease) ([address])     Essential hypertension 07/25/2020    Gastrointestinal hemorrhage 07/20/2022    Hallucinations 02/18/2021    Hard of hearing     Head contusion 09/09/2020    Hyperkalemia 06/28/2022    Iron deficiency anemia, unspecified     Leg ulcer, left, with fat

## 2023-11-20 NOTE — PLAN OF CARE
Problem: Pain  Goal: Verbalizes/displays adequate comfort level or baseline comfort level  Outcome: Progressing     Problem: Pressure Ulcer:  Goal: Signs of wound healing will improve  Description: Signs of wound healing will improve  Outcome: Progressing     Problem: Falls - Risk of:  Goal: Will remain free from falls  Description: Will remain free from falls  Outcome: Progressing

## 2023-11-20 NOTE — PLAN OF CARE
Problem: Pain  Goal: Verbalizes/displays adequate comfort level or baseline comfort level  Outcome: Progressing     Problem: Pressure Ulcer:  Goal: Signs of wound healing will improve  Description: Signs of wound healing will improve  Outcome: Progressing

## 2023-11-22 LAB — STRESS TARGET HR: 146 BPM

## 2023-11-27 ENCOUNTER — HOSPITAL ENCOUNTER (INPATIENT)
Age: 73
LOS: 1 days | Discharge: HOME | DRG: 321 | End: 2023-11-28
Attending: INTERNAL MEDICINE | Admitting: INTERNAL MEDICINE
Payer: MEDICARE

## 2023-11-27 DIAGNOSIS — R93.1 ABNORMAL ECHOCARDIOGRAM: ICD-10-CM

## 2023-11-27 DIAGNOSIS — Z95.5 S/P PRIMARY ANGIOPLASTY WITH CORONARY STENT: Primary | ICD-10-CM

## 2023-11-27 PROBLEM — I25.10 CAD S/P PERCUTANEOUS CORONARY ANGIOPLASTY: Status: ACTIVE | Noted: 2023-11-27

## 2023-11-27 PROBLEM — Z98.61 CAD S/P PERCUTANEOUS CORONARY ANGIOPLASTY: Status: ACTIVE | Noted: 2023-11-27

## 2023-11-27 LAB
BUN BLD-MCNC: 38 MG/DL (ref 8–26)
CHLORIDE BLD-SCNC: 100 MMOL/L (ref 98–107)
ECHO BSA: 1.56 M2
EGFR, POC: 11 ML/MIN/1.73M2
GLUCOSE BLD-MCNC: 110 MG/DL (ref 74–100)
HCT VFR BLD AUTO: 44 % (ref 36–46)
PLATELET # BLD AUTO: 195 K/UL (ref 138–453)
POC CREATININE: 4 MG/DL (ref 0.51–1.19)
POC HEMOGLOBIN (CALC): 15.1 G/DL (ref 12–16)
POTASSIUM BLD-SCNC: 4.4 MMOL/L (ref 3.5–4.5)
SODIUM BLD-SCNC: 139 MMOL/L (ref 138–146)

## 2023-11-27 PROCEDURE — 2500000003 HC RX 250 WO HCPCS: Performed by: STUDENT IN AN ORGANIZED HEALTH CARE EDUCATION/TRAINING PROGRAM

## 2023-11-27 PROCEDURE — B2111ZZ FLUOROSCOPY OF MULTIPLE CORONARY ARTERIES USING LOW OSMOLAR CONTRAST: ICD-10-PCS | Performed by: INTERNAL MEDICINE

## 2023-11-27 PROCEDURE — 6360000002 HC RX W HCPCS: Performed by: INTERNAL MEDICINE

## 2023-11-27 PROCEDURE — 92928 PRQ TCAT PLMT NTRAC ST 1 LES: CPT | Performed by: INTERNAL MEDICINE

## 2023-11-27 PROCEDURE — 84520 ASSAY OF UREA NITROGEN: CPT

## 2023-11-27 PROCEDURE — C9600 PERC DRUG-EL COR STENT SING: HCPCS | Performed by: INTERNAL MEDICINE

## 2023-11-27 PROCEDURE — C1894 INTRO/SHEATH, NON-LASER: HCPCS | Performed by: INTERNAL MEDICINE

## 2023-11-27 PROCEDURE — 84295 ASSAY OF SERUM SODIUM: CPT

## 2023-11-27 PROCEDURE — 2500000003 HC RX 250 WO HCPCS

## 2023-11-27 PROCEDURE — C1892 INTRO/SHEATH,FIXED,PEEL-AWAY: HCPCS | Performed by: INTERNAL MEDICINE

## 2023-11-27 PROCEDURE — 92920 PRQ TRLUML C ANGIOP 1ART&/BR: CPT | Performed by: INTERNAL MEDICINE

## 2023-11-27 PROCEDURE — 2709999900 HC NON-CHARGEABLE SUPPLY: Performed by: INTERNAL MEDICINE

## 2023-11-27 PROCEDURE — 92921 HC PRQ CARDIAC ANGIO ADDL ART: CPT | Performed by: INTERNAL MEDICINE

## 2023-11-27 PROCEDURE — 2580000003 HC RX 258: Performed by: INTERNAL MEDICINE

## 2023-11-27 PROCEDURE — C1725 CATH, TRANSLUMIN NON-LASER: HCPCS | Performed by: INTERNAL MEDICINE

## 2023-11-27 PROCEDURE — B2131ZZ FLUOROSCOPY OF MULTIPLE CORONARY ARTERY BYPASS GRAFTS USING LOW OSMOLAR CONTRAST: ICD-10-PCS | Performed by: INTERNAL MEDICINE

## 2023-11-27 PROCEDURE — 2580000003 HC RX 258: Performed by: STUDENT IN AN ORGANIZED HEALTH CARE EDUCATION/TRAINING PROGRAM

## 2023-11-27 PROCEDURE — 6370000000 HC RX 637 (ALT 250 FOR IP): Performed by: STUDENT IN AN ORGANIZED HEALTH CARE EDUCATION/TRAINING PROGRAM

## 2023-11-27 PROCEDURE — 6360000004 HC RX CONTRAST MEDICATION: Performed by: INTERNAL MEDICINE

## 2023-11-27 PROCEDURE — 82435 ASSAY OF BLOOD CHLORIDE: CPT

## 2023-11-27 PROCEDURE — 93459 L HRT ART/GRFT ANGIO: CPT | Performed by: INTERNAL MEDICINE

## 2023-11-27 PROCEDURE — 6370000000 HC RX 637 (ALT 250 FOR IP): Performed by: INTERNAL MEDICINE

## 2023-11-27 PROCEDURE — C1769 GUIDE WIRE: HCPCS | Performed by: INTERNAL MEDICINE

## 2023-11-27 PROCEDURE — 85049 AUTOMATED PLATELET COUNT: CPT

## 2023-11-27 PROCEDURE — 99221 1ST HOSP IP/OBS SF/LOW 40: CPT | Performed by: INTERNAL MEDICINE

## 2023-11-27 PROCEDURE — B2181ZZ FLUOROSCOPY OF LEFT INTERNAL MAMMARY BYPASS GRAFT USING LOW OSMOLAR CONTRAST: ICD-10-PCS | Performed by: INTERNAL MEDICINE

## 2023-11-27 PROCEDURE — C1874 STENT, COATED/COV W/DEL SYS: HCPCS | Performed by: INTERNAL MEDICINE

## 2023-11-27 PROCEDURE — C9601 PERC DRUG-EL COR STENT BRAN: HCPCS | Performed by: INTERNAL MEDICINE

## 2023-11-27 PROCEDURE — 84132 ASSAY OF SERUM POTASSIUM: CPT

## 2023-11-27 PROCEDURE — 2500000003 HC RX 250 WO HCPCS: Performed by: INTERNAL MEDICINE

## 2023-11-27 PROCEDURE — 85014 HEMATOCRIT: CPT

## 2023-11-27 PROCEDURE — 027034Z DILATION OF CORONARY ARTERY, ONE ARTERY WITH DRUG-ELUTING INTRALUMINAL DEVICE, PERCUTANEOUS APPROACH: ICD-10-PCS | Performed by: INTERNAL MEDICINE

## 2023-11-27 PROCEDURE — 7100000011 HC PHASE II RECOVERY - ADDTL 15 MIN: Performed by: INTERNAL MEDICINE

## 2023-11-27 PROCEDURE — 6360000002 HC RX W HCPCS: Performed by: STUDENT IN AN ORGANIZED HEALTH CARE EDUCATION/TRAINING PROGRAM

## 2023-11-27 PROCEDURE — 36415 COLL VENOUS BLD VENIPUNCTURE: CPT

## 2023-11-27 PROCEDURE — 7100000010 HC PHASE II RECOVERY - FIRST 15 MIN: Performed by: INTERNAL MEDICINE

## 2023-11-27 PROCEDURE — 2060000000 HC ICU INTERMEDIATE R&B

## 2023-11-27 PROCEDURE — 4A023N7 MEASUREMENT OF CARDIAC SAMPLING AND PRESSURE, LEFT HEART, PERCUTANEOUS APPROACH: ICD-10-PCS | Performed by: INTERNAL MEDICINE

## 2023-11-27 PROCEDURE — 82565 ASSAY OF CREATININE: CPT

## 2023-11-27 PROCEDURE — 82947 ASSAY GLUCOSE BLOOD QUANT: CPT

## 2023-11-27 DEVICE — STENT ONYXNG22512UX ONYX 2.25X12RX
Type: IMPLANTABLE DEVICE | Status: FUNCTIONAL
Brand: ONYX FRONTIER™

## 2023-11-27 RX ORDER — SODIUM CHLORIDE 9 MG/ML
INJECTION, SOLUTION INTRAVENOUS PRN
Status: DISCONTINUED | OUTPATIENT
Start: 2023-11-27 | End: 2023-11-29 | Stop reason: HOSPADM

## 2023-11-27 RX ORDER — MIDAZOLAM HYDROCHLORIDE 1 MG/ML
INJECTION INTRAMUSCULAR; INTRAVENOUS PRN
Status: DISCONTINUED | OUTPATIENT
Start: 2023-11-27 | End: 2023-11-27 | Stop reason: HOSPADM

## 2023-11-27 RX ORDER — SODIUM CHLORIDE 0.9 % (FLUSH) 0.9 %
5-40 SYRINGE (ML) INJECTION PRN
Status: DISCONTINUED | OUTPATIENT
Start: 2023-11-27 | End: 2023-11-29 | Stop reason: HOSPADM

## 2023-11-27 RX ORDER — ASPIRIN 81 MG/1
81 TABLET ORAL DAILY
Status: DISCONTINUED | OUTPATIENT
Start: 2023-11-27 | End: 2023-11-29 | Stop reason: HOSPADM

## 2023-11-27 RX ORDER — ONDANSETRON 2 MG/ML
4 INJECTION INTRAMUSCULAR; INTRAVENOUS EVERY 6 HOURS PRN
Status: DISCONTINUED | OUTPATIENT
Start: 2023-11-27 | End: 2023-11-29 | Stop reason: HOSPADM

## 2023-11-27 RX ORDER — SODIUM CHLORIDE 0.9 % (FLUSH) 0.9 %
10 SYRINGE (ML) INJECTION PRN
Status: DISCONTINUED | OUTPATIENT
Start: 2023-11-27 | End: 2023-11-29 | Stop reason: HOSPADM

## 2023-11-27 RX ORDER — SODIUM CHLORIDE 0.9 % (FLUSH) 0.9 %
10 SYRINGE (ML) INJECTION EVERY 12 HOURS SCHEDULED
Status: DISCONTINUED | OUTPATIENT
Start: 2023-11-27 | End: 2023-11-29 | Stop reason: HOSPADM

## 2023-11-27 RX ORDER — HEPARIN SODIUM 5000 [USP'U]/ML
5000 INJECTION, SOLUTION INTRAVENOUS; SUBCUTANEOUS EVERY 8 HOURS SCHEDULED
Status: DISCONTINUED | OUTPATIENT
Start: 2023-11-27 | End: 2023-11-29 | Stop reason: HOSPADM

## 2023-11-27 RX ORDER — SODIUM CHLORIDE 0.9 % (FLUSH) 0.9 %
5-40 SYRINGE (ML) INJECTION EVERY 12 HOURS SCHEDULED
Status: DISCONTINUED | OUTPATIENT
Start: 2023-11-27 | End: 2023-11-29 | Stop reason: HOSPADM

## 2023-11-27 RX ORDER — METOPROLOL TARTRATE 1 MG/ML
INJECTION, SOLUTION INTRAVENOUS
Status: COMPLETED
Start: 2023-11-27 | End: 2023-11-27

## 2023-11-27 RX ORDER — BIVALIRUDIN 250 MG/5ML
INJECTION, POWDER, LYOPHILIZED, FOR SOLUTION INTRAVENOUS PRN
Status: DISCONTINUED | OUTPATIENT
Start: 2023-11-27 | End: 2023-11-27 | Stop reason: HOSPADM

## 2023-11-27 RX ORDER — AMIODARONE HYDROCHLORIDE 200 MG/1
100 TABLET ORAL EVERY MORNING
Status: DISCONTINUED | OUTPATIENT
Start: 2023-11-28 | End: 2023-11-29 | Stop reason: HOSPADM

## 2023-11-27 RX ORDER — METOPROLOL TARTRATE 1 MG/ML
5 INJECTION, SOLUTION INTRAVENOUS EVERY 6 HOURS PRN
Status: DISCONTINUED | OUTPATIENT
Start: 2023-11-27 | End: 2023-11-29 | Stop reason: HOSPADM

## 2023-11-27 RX ORDER — PROMETHAZINE HYDROCHLORIDE 25 MG/1
12.5 TABLET ORAL EVERY 6 HOURS PRN
Status: DISCONTINUED | OUTPATIENT
Start: 2023-11-27 | End: 2023-11-29 | Stop reason: HOSPADM

## 2023-11-27 RX ORDER — CLOPIDOGREL BISULFATE 75 MG/1
75 TABLET ORAL DAILY
Status: DISCONTINUED | OUTPATIENT
Start: 2023-11-27 | End: 2023-11-29 | Stop reason: HOSPADM

## 2023-11-27 RX ORDER — MIRTAZAPINE 15 MG/1
15 TABLET, FILM COATED ORAL NIGHTLY
Status: DISCONTINUED | OUTPATIENT
Start: 2023-11-27 | End: 2023-11-29 | Stop reason: HOSPADM

## 2023-11-27 RX ORDER — CLOPIDOGREL 300 MG/1
TABLET, FILM COATED ORAL PRN
Status: DISCONTINUED | OUTPATIENT
Start: 2023-11-27 | End: 2023-11-27 | Stop reason: HOSPADM

## 2023-11-27 RX ORDER — ACETAMINOPHEN 650 MG/1
650 SUPPOSITORY RECTAL EVERY 6 HOURS PRN
Status: DISCONTINUED | OUTPATIENT
Start: 2023-11-27 | End: 2023-11-29 | Stop reason: HOSPADM

## 2023-11-27 RX ORDER — ASPIRIN 81 MG/1
TABLET, CHEWABLE ORAL PRN
Status: DISCONTINUED | OUTPATIENT
Start: 2023-11-27 | End: 2023-11-27 | Stop reason: HOSPADM

## 2023-11-27 RX ORDER — NITROGLYCERIN 20 MG/100ML
INJECTION INTRAVENOUS PRN
Status: DISCONTINUED | OUTPATIENT
Start: 2023-11-27 | End: 2023-11-27 | Stop reason: HOSPADM

## 2023-11-27 RX ORDER — ACETAMINOPHEN 325 MG/1
650 TABLET ORAL EVERY 6 HOURS PRN
Status: DISCONTINUED | OUTPATIENT
Start: 2023-11-27 | End: 2023-11-29 | Stop reason: HOSPADM

## 2023-11-27 RX ORDER — SODIUM CHLORIDE 9 MG/ML
INJECTION, SOLUTION INTRAVENOUS CONTINUOUS
Status: DISCONTINUED | OUTPATIENT
Start: 2023-11-27 | End: 2023-11-29 | Stop reason: HOSPADM

## 2023-11-27 RX ORDER — METOPROLOL SUCCINATE 25 MG/1
25 TABLET, EXTENDED RELEASE ORAL EVERY EVENING
Status: DISCONTINUED | OUTPATIENT
Start: 2023-11-27 | End: 2023-11-29 | Stop reason: HOSPADM

## 2023-11-27 RX ORDER — LANOLIN ALCOHOL/MO/W.PET/CERES
6 CREAM (GRAM) TOPICAL NIGHTLY PRN
Status: DISCONTINUED | OUTPATIENT
Start: 2023-11-27 | End: 2023-11-29 | Stop reason: HOSPADM

## 2023-11-27 RX ORDER — ATORVASTATIN CALCIUM 40 MG/1
40 TABLET, FILM COATED ORAL EVERY EVENING
Status: DISCONTINUED | OUTPATIENT
Start: 2023-11-27 | End: 2023-11-29 | Stop reason: HOSPADM

## 2023-11-27 RX ORDER — ACETAMINOPHEN 325 MG/1
650 TABLET ORAL EVERY 4 HOURS PRN
Status: DISCONTINUED | OUTPATIENT
Start: 2023-11-27 | End: 2023-11-27 | Stop reason: SDUPTHER

## 2023-11-27 RX ORDER — POLYETHYLENE GLYCOL 3350 17 G/17G
17 POWDER, FOR SOLUTION ORAL DAILY PRN
Status: DISCONTINUED | OUTPATIENT
Start: 2023-11-27 | End: 2023-11-29 | Stop reason: HOSPADM

## 2023-11-27 RX ADMIN — SODIUM CHLORIDE, PRESERVATIVE FREE 10 ML: 5 INJECTION INTRAVENOUS at 20:10

## 2023-11-27 RX ADMIN — SODIUM CHLORIDE: 9 INJECTION, SOLUTION INTRAVENOUS at 10:30

## 2023-11-27 RX ADMIN — METOPROLOL SUCCINATE 25 MG: 25 TABLET, EXTENDED RELEASE ORAL at 20:15

## 2023-11-27 RX ADMIN — HEPARIN SODIUM 5000 UNITS: 5000 INJECTION INTRAVENOUS; SUBCUTANEOUS at 20:10

## 2023-11-27 RX ADMIN — CLOPIDOGREL BISULFATE 75 MG: 75 TABLET ORAL at 20:10

## 2023-11-27 RX ADMIN — METOPROLOL TARTRATE 5 MG: 1 INJECTION, SOLUTION INTRAVENOUS at 16:54

## 2023-11-27 RX ADMIN — ATORVASTATIN CALCIUM 40 MG: 40 TABLET, FILM COATED ORAL at 20:09

## 2023-11-27 RX ADMIN — SODIUM CHLORIDE: 9 INJECTION, SOLUTION INTRAVENOUS at 15:38

## 2023-11-27 RX ADMIN — ZIPRASIDONE MESYLATE 20 MG: 20 INJECTION, POWDER, LYOPHILIZED, FOR SOLUTION INTRAMUSCULAR at 20:50

## 2023-11-27 RX ADMIN — MIRTAZAPINE 15 MG: 15 TABLET, FILM COATED ORAL at 20:10

## 2023-11-27 NOTE — CARE COORDINATION
Case Management Assessment  Initial Evaluation    Date/Time of Evaluation: 11/27/2023 4:18 PM  Assessment Completed by: Cristo Kinney RN    If patient is discharged prior to next notation, then this note serves as note for discharge by case management. Patient Name: Debbie Rush                   YOB: 1950  Diagnosis: Abnormal echocardiogram [R93.1]  CAD S/P percutaneous coronary angioplasty [I25.10, Z98.61]  S/P primary angioplasty with coronary stent [Z95.5]                   Date / Time: 11/27/2023  9:20 AM    Patient Admission Status: Inpatient   Readmission Risk (Low < 19, Mod (19-27), High > 27): Readmission Risk Score: 20.5    Current PCP: Courtney Donaldson MD  PCP verified by CM? Yes    Chart Reviewed: Yes      History Provided by: Child/Family  Patient Orientation: Other (see comment)    Patient Cognition: Alert    Hospitalization in the last 30 days (Readmission):  No    If yes, Readmission Assessment in  Navigator will be completed.     Advance Directives:      Code Status: Full Code   Patient's Primary Decision Maker is: Legal Next of Kin    Primary Decision Maker: Heidi Ball - Child - [phone]    Primary Decision Maker: Maribel  - Child - [phone]    Primary Decision Maker: Nelda Parker Child - [phone]    Primary Decision Maker: Amelia Richardsr Child - [phone]    Discharge Planning:    Patient lives with: Children Type of Home: House  Primary Care Giver: Family  Patient Support Systems include: Children   Current Financial resources: Medicare  Current community resources:    Current services prior to admission: Durable Medical Equipment            Current DME: (P) Bedside Commode, Wheelchair            Type of Home Care services:  (P) None    ADLS  Prior functional level: Assistance with the following:, Bathing, Dressing, Toileting, Cooking, Housework, Shopping, Mobility  Current functional level: Assistance with the following:, Bathing, Dressing, Toileting, Cooking, Housework, Shopping, Mobility    PT AM-PAC:   /24  OT AM-PAC:   /24    Family can provide assistance at DC: Yes  Would you like Case Management to discuss the discharge plan with any other family members/significant others, and if so, who? Plans to Return to Present Housing: Yes  Other Identified Issues/Barriers to RETURNING to current housing: none  Potential Assistance needed at discharge: (P) N/A            Potential DME:    Patient expects to discharge to: (P) [address] [facility] for transportation at discharge: (P) Family    Financial    Payor: Kaylee [place] / Plan: MEDICARE PART A AND B / Product Type: *No Product type* /     Does insurance require precert for SNF: No    Potential assistance Purchasing Medications:    Meds-to-Beds request:        Fidel Scott, [address] [phone] - F [phone]  [address] [facility]  [address] [facility]  Phone: [phone] Fax: [phone]    [facility] [address] ARVIND MONROE [address] [facility], [address] [place] [address] [phone] Yoli Sunol [phone]  [address]  [address] [facility] [address]  Phone: [phone] Fax: [phone]      Notes:    Factors facilitating achievement of predicted outcomes: Family support, Caregiver support, Pleasant, and Has needed Durable Medical Equipment at home    Barriers to discharge: Medical complications    Additional Case Management Notes: history provided by patient's daughter, Elida Homans. Patient returning home with her son. Elida Homans is also with her daily.  She denies needs and has transportation home    The Plan for Transition of Care is related to the following treatment goals of Abnormal echocardiogram [R93.1]  CAD S/P percutaneous coronary angioplasty [I25.10, Z98.61]  S/P primary angioplasty with coronary stent [Z95.5]    The Patient and/or Patient Representative Agree with the Discharge Plan?  yes    Bea Spencer RN  Case Management Department  Ph: [phone] Fax: [phone]

## 2023-11-27 NOTE — PLAN OF CARE
Problem: Chronic Conditions and Co-morbidities  Goal: Patient's chronic conditions and co-morbidity symptoms are monitored and maintained or improved  Outcome: Progressing     Problem: Safety - Adult  Goal: Free from fall injury  Outcome: Progressing     Problem: Skin/Tissue Integrity  Goal: Absence of new skin breakdown  Description: 1. Monitor for areas of redness and/or skin breakdown  2. Assess vascular access sites hourly  3. Every 4-6 hours minimum:  Change oxygen saturation probe site  4. Every 4-6 hours:  If on nasal continuous positive airway pressure, respiratory therapy assess nares and determine need for appliance change or resting period.   Outcome: Progressing     Problem: Discharge Planning  Goal: Discharge to home or other facility with appropriate resources  Outcome: Progressing

## 2023-11-27 NOTE — PROGRESS NOTES
Patient admitted, consent signed and questions answered. Patient ready for procedure. Call light to reach with side rails up 2 of 2. .  Daughter at bedside with patient. History and physical needs completed. Bhavana Goodwin

## 2023-11-27 NOTE — PROGRESS NOTES
Received post procedure to [facility] to room 5. Assessment obtained. Restrictions reviewed with patient. Post procedure pathway initiated. Rt groin site soft, transparent dressing dry and intact. No hematoma noted. Family at side. Patient without complaints. Head of bed flat with Rt  leg straight. Rt radial art line rezeroed. Art line with good wave form.

## 2023-11-27 NOTE — H&P
ESOPHAGOGASTRODUODENOSCOPY performed by Mae Amaya MD at [facility]        reports that she quit smoking about 23 years ago. Her smoking use included cigarettes. She has a 2.50 pack-year smoking history. She has never used smokeless tobacco. She reports that she does not currently use alcohol. She reports that she does not use drugs. Prior to Admission medications    Medication Sig Start Date End Date Taking? Authorizing Provider   desloratadine (CLARINEX) 5 MG tablet Take 1 tablet by mouth daily 10/22/23   Mino Berry MD   ketorolac (ACULAR) 0.5 % ophthalmic solution INSTILL 1 DROP INTO AFFECTED EYE 4 TIMES DAILY STARTING 1 DAY BEFORE SURGERY 10/27/23   Mino Berry MD   moxifloxacin (VIGAMOX) 0.5 % ophthalmic solution INSTILL 1 DROP INTO AFFECTED EYE 4 TIMES DAILY STARTING 1 DAY BEFORE SURGERY 10/27/23   Mino Berry MD   prednisoLONE acetate (PRED FORTE) 1 % ophthalmic suspension INSTILL 1 DROP INTO AFFECTED EYE 4 TIMES DAILY STARTING 1 DAY BEFORE SURGERY 10/27/23   Mino Berry MD   mirtazapine (REMERON) 15 MG tablet Take 1 tablet by mouth nightly Dose increased 11/17/2023 11/17/23   Magnolia Shepard MD   mirtazapine (REMERON) 15 MG tablet Take 1 tablet by mouth nightly Dose increased 11/17/2023 . 30 days RX till the mail order comes  Patient not taking: Reported on 11/20/2023 11/17/23   Magnolia Shepard MD   mupirocin (BACTROBAN) 2 % ointment Apply topically 3 times daily x 10 days. 11/17/23   Magnolia Shepard MD   famotidine (PEPCID) 20 MG tablet Take 1 tablet by mouth every morning (before breakfast) Replacing omeprazole due to end-stage kidney disease 10/27/23   Magnolia Shepard MD   lamoTRIgine (LAMICTAL) 25 MG tablet Take 1 tablet by mouth 2 times daily 10/25/23   Angie Singh MD   furosemide (LASIX) 80 MG tablet Take 1 tablet by mouth every other day Water pill, does not take on dialysis days.  10/23/23   Magnolia Shepard MD needed for pain, maximum 20 g/day 7/26/23   Gabriela Cardoso MD   albuterol sulfate HFA (PROAIR HFA) 108 (90 Base) MCG/ACT inhaler Inhale 2 puffs into the lungs every 6 hours as needed for Wheezing or Shortness of Breath (cough) 7/19/23   Gabriela Cardoso MD   levothyroxine (SYNTHROID) 25 MCG tablet Take 1 tablet by mouth every morning (before breakfast) without no other meds for 30 minutes, take when you first wake up and you are still bed 7/13/23   Magnolia Shepard MD   amiodarone (PACERONE) 100 MG tablet Take 1 tablet by mouth every morning 7/11/23   Magnolia Shepard MD   midodrine (PROAMATINE) 5 MG tablet Take 1 tablet by mouth 2 times daily as needed (if low BP below 115/65, take to dialysis) 7/11/23   Magnolia Shepard MD   acetaminophen (TYLENOL) 500 MG tablet Take 1 tablet by mouth every 6 hours as needed for Pain or Fever    ProviderMino MD   Elastic Bandages & Supports (KNEE BRACE/FLEX STAYS MEDIUM) MISC as directed 2/15/23   ProviderMino MD   amLODIPine (NORVASC) 10 MG tablet Take 1 tablet by mouth nightly 6/11/22 7/18/22  Alexy Briggs MD   Blood Pressure KIT Diagnosis: HTN. Needs to check blood pressure 1-2 times a day until stable, then once a day. Goal blood pressure less than 135/85, and above 110/60. 5/26/22   Gabriela Cardoso MD   Nebulizers (COMPRESSOR/NEBULIZER) MISC Nebulizer with compressor. Disposable Med Nebs 2 /month. Reusable Med Nebs 1 per 6 months. Aerosol Mask 1 per month. Replacement Filters 2 per month. All other related supplies as needed per month. Medications being used: Albuterol . 083 unit dose vial. Frequency: every 6 hrs x 4/day . Length of Need: 1 2/22/22   Patrice Campbell MD   Handicap Placard MISC by Does not apply route Expires on 12/30/25 2/22/22   Patrice Campbell MD       Allergies   Allergen Reactions    Latex Rash    Aleve [Naproxen Sodium]      Chronic kidney disease stage III, CHF    Apixaban      Severe GI bleed, needed blood

## 2023-11-28 VITALS
WEIGHT: 129.63 LBS | HEIGHT: 59 IN | TEMPERATURE: 98.8 F | RESPIRATION RATE: 26 BRPM | SYSTOLIC BLOOD PRESSURE: 130 MMHG | BODY MASS INDEX: 26.13 KG/M2 | HEART RATE: 108 BPM | DIASTOLIC BLOOD PRESSURE: 86 MMHG | OXYGEN SATURATION: 95 %

## 2023-11-28 PROBLEM — R93.1 ABNORMAL ECHOCARDIOGRAM: Status: ACTIVE | Noted: 2023-11-28

## 2023-11-28 LAB
ANION GAP SERPL CALCULATED.3IONS-SCNC: 23 MMOL/L (ref 9–17)
BUN SERPL-MCNC: 47 MG/DL (ref 8–23)
CALCIUM SERPL-MCNC: 9.1 MG/DL (ref 8.6–10.4)
CHLORIDE SERPL-SCNC: 94 MMOL/L (ref 98–107)
CO2 SERPL-SCNC: 22 MMOL/L (ref 20–31)
CREAT SERPL-MCNC: 4.6 MG/DL (ref 0.5–0.9)
GFR SERPL CREATININE-BSD FRML MDRD: 9 ML/MIN/1.73M2
GLUCOSE SERPL-MCNC: 140 MG/DL (ref 70–99)
POTASSIUM SERPL-SCNC: 4.9 MMOL/L (ref 3.7–5.3)
SODIUM SERPL-SCNC: 139 MMOL/L (ref 135–144)

## 2023-11-28 PROCEDURE — 6370000000 HC RX 637 (ALT 250 FOR IP): Performed by: STUDENT IN AN ORGANIZED HEALTH CARE EDUCATION/TRAINING PROGRAM

## 2023-11-28 PROCEDURE — 99221 1ST HOSP IP/OBS SF/LOW 40: CPT | Performed by: INTERNAL MEDICINE

## 2023-11-28 PROCEDURE — 5A1D70Z PERFORMANCE OF URINARY FILTRATION, INTERMITTENT, LESS THAN 6 HOURS PER DAY: ICD-10-PCS | Performed by: INTERNAL MEDICINE

## 2023-11-28 PROCEDURE — 2500000003 HC RX 250 WO HCPCS: Performed by: STUDENT IN AN ORGANIZED HEALTH CARE EDUCATION/TRAINING PROGRAM

## 2023-11-28 PROCEDURE — 97530 THERAPEUTIC ACTIVITIES: CPT

## 2023-11-28 PROCEDURE — 80048 BASIC METABOLIC PNL TOTAL CA: CPT

## 2023-11-28 PROCEDURE — 90935 HEMODIALYSIS ONE EVALUATION: CPT

## 2023-11-28 PROCEDURE — 6360000002 HC RX W HCPCS: Performed by: STUDENT IN AN ORGANIZED HEALTH CARE EDUCATION/TRAINING PROGRAM

## 2023-11-28 PROCEDURE — 6360000002 HC RX W HCPCS: Performed by: INTERNAL MEDICINE

## 2023-11-28 PROCEDURE — 97162 PT EVAL MOD COMPLEX 30 MIN: CPT

## 2023-11-28 PROCEDURE — 99239 HOSP IP/OBS DSCHRG MGMT >30: CPT

## 2023-11-28 PROCEDURE — 97535 SELF CARE MNGMENT TRAINING: CPT

## 2023-11-28 PROCEDURE — 36415 COLL VENOUS BLD VENIPUNCTURE: CPT

## 2023-11-28 PROCEDURE — 97167 OT EVAL HIGH COMPLEX 60 MIN: CPT

## 2023-11-28 PROCEDURE — 97116 GAIT TRAINING THERAPY: CPT

## 2023-11-28 PROCEDURE — 2580000003 HC RX 258: Performed by: STUDENT IN AN ORGANIZED HEALTH CARE EDUCATION/TRAINING PROGRAM

## 2023-11-28 RX ORDER — CLOPIDOGREL BISULFATE 75 MG/1
75 TABLET ORAL DAILY
Qty: 30 TABLET | Refills: 3 | Status: SHIPPED | OUTPATIENT
Start: 2023-11-29

## 2023-11-28 RX ORDER — HEPARIN SODIUM 1000 [USP'U]/ML
1600 INJECTION, SOLUTION INTRAVENOUS; SUBCUTANEOUS PRN
Status: DISCONTINUED | OUTPATIENT
Start: 2023-11-28 | End: 2023-11-29 | Stop reason: HOSPADM

## 2023-11-28 RX ADMIN — Medication 6 MG: at 00:10

## 2023-11-28 RX ADMIN — AMIODARONE HYDROCHLORIDE 100 MG: 200 TABLET ORAL at 09:33

## 2023-11-28 RX ADMIN — SODIUM CHLORIDE, PRESERVATIVE FREE 10 ML: 5 INJECTION INTRAVENOUS at 20:49

## 2023-11-28 RX ADMIN — METOPROLOL SUCCINATE 25 MG: 25 TABLET, EXTENDED RELEASE ORAL at 20:49

## 2023-11-28 RX ADMIN — SODIUM CHLORIDE, PRESERVATIVE FREE 10 ML: 5 INJECTION INTRAVENOUS at 09:34

## 2023-11-28 RX ADMIN — HEPARIN SODIUM 1600 UNITS: 1000 INJECTION INTRAVENOUS; SUBCUTANEOUS at 19:26

## 2023-11-28 RX ADMIN — ATORVASTATIN CALCIUM 40 MG: 40 TABLET, FILM COATED ORAL at 20:49

## 2023-11-28 RX ADMIN — METOPROLOL TARTRATE 5 MG: 1 INJECTION, SOLUTION INTRAVENOUS at 00:17

## 2023-11-28 RX ADMIN — MIRTAZAPINE 15 MG: 15 TABLET, FILM COATED ORAL at 20:49

## 2023-11-28 RX ADMIN — HEPARIN SODIUM 5000 UNITS: 5000 INJECTION INTRAVENOUS; SUBCUTANEOUS at 06:32

## 2023-11-28 RX ADMIN — HEPARIN SODIUM 1600 UNITS: 1000 INJECTION INTRAVENOUS; SUBCUTANEOUS at 19:27

## 2023-11-28 RX ADMIN — ASPIRIN 81 MG: 81 TABLET, COATED ORAL at 09:32

## 2023-11-28 RX ADMIN — CLOPIDOGREL BISULFATE 75 MG: 75 TABLET ORAL at 09:33

## 2023-11-28 RX ADMIN — SODIUM CHLORIDE, PRESERVATIVE FREE 10 ML: 5 INJECTION INTRAVENOUS at 09:32

## 2023-11-28 RX ADMIN — SODIUM CHLORIDE, PRESERVATIVE FREE 10 ML: 5 INJECTION INTRAVENOUS at 20:50

## 2023-11-28 NOTE — DISCHARGE SUMMARY
[facility]  Discharge Note                 Name:  Anita Arvizu  YOB: 1950  Social Security Number:  [id number]  Medical Record Number:  [id number]    Date of Admission:  11/27/2023  Date of Discharge:  11/28/2023    Admitting physician: Ancelmo Kaur MD    Discharge Attending: MAL Ruiz CNP, CNP  Primary Care Physician: Grupo Perkins MD  Consultants: Cardiology  Discharge to Home in stable condition    HOSPITAL ADMISSION PROBLEM LIST:  Patient Active Problem List   Diagnosis    Vitamin D deficiency    Venous insufficiency of both lower extremities    Type 2 diabetes mellitus with chronic kidney disease on chronic dialysis, without long-term current use of insulin ([address])    Coronary artery disease involving native coronary artery of native heart without angina pectoris    Hypertension, essential    Iron deficiency anemia    Colonoscopy refused    Bilateral hearing loss    COPD (chronic obstructive pulmonary disease) ([address])    Gout with tophi    Dyslipidemia    Metabolic encephalopathy    E. coli UTI    Meningioma ([address])    Kidney stones    Diverticulosis    COVID-19 vaccination declined    Chronic venous stasis dermatitis of both lower extremities    Family history of colon cancer    Family history of pancreatic cancer    Mild malnutrition ([address])    Decreased strength, endurance, and mobility    Lymphedema    PVD (peripheral vascular disease) ([address])    ESRD on dialysis ([address])    Hypertensive heart and chronic kidney disease with heart failure and with stage 5 chronic kidney disease, or end stage renal disease ([address])    Gastroesophageal reflux disease    Moderate anxiety    Other age-related cataract    Hypertensive CKD, ESRD on dialysis ([address])    History of GI bleed    Chronic combined systolic and diastolic CHF (congestive heart failure) ([address])    Degenerative disease of nervous system, unspecified ([address])    Allergic rhinitis    Acquired hypothyroidism    Type 2

## 2023-11-28 NOTE — PROGRESS NOTES
2000: Primary notified of patent's orientation status. Patient screaming \"Help, somebody help me. Fire department help. \" Stating that the nurses are beating on patient and hurting patient. Also stating that someone is raping her. Nurse just trying to get temperature and fix patient's telemetry. Per patient \"get the fxxx away from me\" Patient repeatedly screaming for Claudine Gentile and asking Claudine Gentile for his opinion. It is just me, Florencio RN and Hazel RN in room. New one time dose noted. 0000: MD updated on patient status, -still disoriented and yelling out, request for something else to calm patient, new PRN orders noted. Patient ripped IV out, writer attempting to get tele sitter. Unable to locate camera or get a hold of staffing. 0200:Tele sitter acquired. 0300: Daughter called asking for updates for patient. Writer explained patient's behaviors. Per daughter this is patient's baseline lately at home. Notified that she has a tele sitter. All comments, questions, concerns addressed.

## 2023-11-28 NOTE — PROGRESS NOTES
OK to D/c once patient receives dialysis. Discussed with bedside RN, daughter, and patient.      Electronically signed by MAL Hollins CNP on 11/28/2023 at 10:10 AM

## 2023-11-28 NOTE — PROGRESS NOTES
Occupational Therapy  Facility/Department: [facility] CAR 2- STEPD[facility]  Occupational Therapy Initial Assessment    Name: Amado Jovel  : [birth year]  MRN: [id number]  Date of Service: 2023    Discharge Recommendations:   Further therapy recommended at discharge. OT Equipment Recommendations  Equipment Needed: Yes  Mobility Devices: ADL Assistive Devices  ADL Assistive Devices: Toileting - 3-in-1 Commode; Shower Chair with back       Patient Diagnosis(es): The encounter diagnosis was Abnormal echocardiogram.  Past Medical History:  has a past medical history of Acute CVA (cerebrovascular accident) ([address]), Acute kidney injury superimposed on CKD ([address]), Acute on chronic combined systolic (congestive) and diastolic (congestive) heart failure ([address]), Acute respiratory failure with hypoxia and hypercapnia ([address]), JOY (acute kidney injury) ([address]), Arthritis, Asthma, Atrial fibrillation, unspecified type ([address]), Bilateral lower extremity edema, Bilateral pleural effusion, Bradycardia, Chronic diastolic congestive heart failure ([address]), Chronic ulcer of left leg with fat layer exposed ([address]), CKD stage 4 secondary to hypertension ([address]), Dependence on renal dialysis ([address]), ESRD (end stage renal disease) ([address]), Essential hypertension, Gastrointestinal hemorrhage, Gout, Hallucinations, Hard of hearing, Head contusion, Hyperkalemia, Iron deficiency anemia, unspecified, Leg ulcer, left, with fat layer exposed ([address]), Lymphedema, Meningioma ([address]), Meningioma, cerebral ([address]), Mild intermittent asthma, uncomplicated, Myocardial infarction ([address]), Nephrotic range proteinuria, NSTEMI (non-ST elevated myocardial infarction) ([address]), Obesity (BMI 30-39.9), Old myocardial infarction, Paroxysmal atrial fibrillation ([address]), Pressure injury of buttock, stage 2 ([address]), Pressure injury of sacral region, stage 2 ([address]), Pulmonary edema cardiac cause ([address]), Pure hypercholesterolemia, Sepsis ([address]), Severe malnutrition ([address]), Shock ([address]), Stage 4 chronic secondary to decreased cognition;Patient limited by fatigue  Activity Tolerance Comments: Pt's activity tolerance severely limited this date d/t pt's confusion, decreased cognition, significant [place], significant vision loss, and fatigue /decreased endurance w/ increased activity this session. Plan   Occupational Therapy Plan  Times Per Week: 3-4x/week     Restrictions  Restrictions/Precautions  Restrictions/Precautions: Contact Precautions  Required Braces or Orthoses?: No  Position Activity Restriction  Other position/activity restrictions: s/p cardiac cath on 11/27; RN reports pt is very [place] and has significant visual deficits    Subjective   General  Patient assessed for rehabilitation services?: Yes  Family / Caregiver Present: No  General Comment  Comments: RN ok'd for OT/PT eval this AM. Pt agreeable to session, pleasent/cooperative throughout. Pt did not report pain or exhibit signs of pain throughout entire session. Pt does present confused throughout session. Social/Functional History  Social/Functional History  Lives With:  (Pt with confusion, visual deficits and [place])  Additional Comments: Not able to assess d/t cognition. Pt not answering questions appropriate when asked. Pt does have personal WC in room, does report assistance with pivoting but questionable with all provided information. Objective           Safety Devices  Type of Devices: Call light within reach;Gait belt;Patient at risk for falls; Left in bed (PT present upon exit this date)  Restraints  Restraints Initially in Place: No  Bed Mobility Training  Bed Mobility Training: Yes  Overall Level of Assistance:  Moderate assistance;Assist X2;Additional time  Interventions: Verbal cues (Pt required Max vc's and redirection to task this date d/t pt w/ confusion, cognitive deficits, significant [place], and significant visual deficits for initiation and sequencing of task.)  Supine to Sit: Moderate assistance;Assist X1;Additional time (For

## 2023-11-28 NOTE — PROGRESS NOTES
unsteady throughout w/ no LOB.)  Transfer Training  Transfer Training: Yes  Overall Level of Assistance: Assist X2;Additional time;Maximum assistance (Pt completed x2 face-to-face stand pivot transfer from EOB to bedside commode requiring Mod A x1 for first transfer then requiring Mod Ax2 from commode back to bed. Pt usteady throughout.)  Interventions: Safety awareness training;Verbal cues (Pt required Max vc's throughout for proper hand/body placement during transfer, initiation, and sequencing.)  Sit to Stand: Assist X1;Additional time;Maximum assistance  Stand to Sit: Assist X1;Additional time;Maximum assistance  Stand Pivot Transfers: Assist X2;Additional time;Maximum assistance  Gait  Overall Level of Assistance:  (Unable to formally assess d/t pt's decreased standing balance, BLE weakness and cognitive deficits/confusion this date.)  Bed mobility  Rolling to Left: Moderate assistance;2 Person assistance  Rolling to Right: 2 Person assistance; Moderate assistance  Supine to Sit: Moderate assistance;2 Person assistance  Sit to Supine: Moderate assistance;2 Person assistance  Scooting: Moderate assistance  Bed Mobility Comments: Pt require extra time and effort with external support, physical assist and verbal instruction  Transfers  Sit to Stand: Moderate Assistance;2 Person Assistance  Stand to Sit: Moderate Assistance;2 Person Assistance  Bed to Chair: Moderate assistance;2 Person Assistance  Ambulation  Surface: Level tile  Device: Rolling Walker  Assistance:  Moderate assistance;2 Person assistance  Quality of Gait: Poor standing ability and tolerance,  pt able to take 1-2 shuffle steps high fall risk  Gait Deviations: Decreased step length;Decreased step height;Shuffles  Distance: 1-2 steps     Balance  Posture: Fair  Sitting - Static: Fair  Sitting - Dynamic: Fair  Standing - Static: Poor  Standing - Dynamic: Poor  Functional Reach Test  Fall Risk (Score of <10 inches is fall risk): Yes  Exercise Treatment: Supine  LE exercises: ankle pumps, LAQ, abd  Reps: x5- 10 in all places poor carryover                                                        AM-PAC Score  AM-PAC Inpatient Mobility Raw Score : 8 (11/28/23 1602)  AM-PAC Inpatient T-Scale Score : 28.52 (11/28/23 1602)  Mobility Inpatient CMS 0-100% Score: 86.62 (11/28/23 1602)  Mobility Inpatient CMS G-Code Modifier : CM (11/28/23 1602)               Goals  Short Term Goals  Time Frame for Short Term Goals: 14 visits  Short Term Goal 1: Pt to stand pivot transfer with Mod A x 1  Short Term Goal 2: Pt to ambulate 5 ft with RW for safety and indepndence with transfers  Short Term Goal 3: Pt to tolerate and actively participate. 30 min ther ex/act to improve general strength and activity toelrance. Patient Goals   Patient Goals : no goals stated due to cognitive deficits pt with visual and auditory hallucinations seeing people and hearing and answering questions from her \"customers\"       Education  Patient Education  Education Given To: Patient  Education Provided: Role of Therapy;Plan of Care;Precautions;Transfer Training; Fall Prevention Strategies  Education Method: Demonstration;Verbal  Barriers to Learning: Cognition  Education Outcome: Continued education needed; Unable to demonstrate understanding      Therapy Time   Individual Concurrent Group Co-treatment   Time In 0811         Time Out 0849         Minutes 38         Timed Code Treatment Minutes: 23 Minutes (Partial CO Tx with OT)     Co- treatment with OT warranted secondary to decreased patient safety and independence with functional mobility requiring skilled physical assistance of two professionals to simultaneously address individualized discipline goals. PT is addressing sitting and standing balance, standing tolerance and transfers , while OT is addressing their individualized functional mobility/self-care task.        Jim Teran, PT, DPT

## 2023-11-28 NOTE — CARE COORDINATION
Transition Plan  Spoke with Armand Dunham, Son, declining SNF as recommended by OT. Also declining HHC. Per earlier CM note, Jarred Sr, daughter also states home with her son. Armand Dunham verbalized \"we give better care than the Nursing homes and have been taking care of her. She has 24/7 care\".

## 2023-11-28 NOTE — PROGRESS NOTES
Dialysis Time Out  To be done by RN and tech or 2 RNs  Staff Names Maddy Diamond RN and Joni Tovar RN    [x]  Identity of the patient using 2 patient identifiers  [x]  Consent for treatment  [x]  Equipment-proper machine and dialyzer  [x]  B-Hep B status  [x]  Orders- to include bath, blood flow, dialyzer, time and fluid removal  [x]  Access-Correct site and in working order  [x]  Time for patient to ask questions.

## 2023-11-29 ENCOUNTER — CARE COORDINATION (OUTPATIENT)
Dept: CASE MANAGEMENT | Age: 73
End: 2023-11-29

## 2023-11-29 ENCOUNTER — TELEPHONE (OUTPATIENT)
Dept: FAMILY MEDICINE CLINIC | Age: 73
End: 2023-11-29

## 2023-11-29 DIAGNOSIS — F01.B4 MODERATE VASCULAR DEMENTIA WITH ANXIETY (HCC): Primary | ICD-10-CM

## 2023-11-29 DIAGNOSIS — I67.9 CEREBROVASCULAR DISEASE: ICD-10-CM

## 2023-11-29 DIAGNOSIS — I25.10 CAD S/P PERCUTANEOUS CORONARY ANGIOPLASTY: Primary | ICD-10-CM

## 2023-11-29 DIAGNOSIS — R26.81 UNSTEADINESS ON FEET: ICD-10-CM

## 2023-11-29 DIAGNOSIS — Z98.61 CAD S/P PERCUTANEOUS CORONARY ANGIOPLASTY: Primary | ICD-10-CM

## 2023-11-29 PROCEDURE — 1111F DSCHRG MED/CURRENT MED MERGE: CPT

## 2023-11-29 NOTE — TELEPHONE ENCOUNTER
Please advise daughter I have ordered it, and to schedule it, and to ask the scheduling person to put it through prior authorization, also placed a note in the referral       Diagnosis Orders   1. Moderate vascular dementia with anxiety ([address])  Vascular duplex carotid bilateral      2. Cerebrovascular disease  Vascular duplex carotid bilateral      3. Unsteadiness on feet  Vascular duplex carotid bilateral           Future Appointments   Date Time Provider [address]   12/1/2023  3:30 PM Tomasa Eisenmenger, MD [facility]   12/4/2023  1:45 PM Reinaldo Form, MD [facility]   12/6/2023  1:00 PM Jacinto Iyer, DO [facility] -   12/11/2023  3:30 PM Tegan Batista DO [facility]   12/15/2023  2:30 PM Carlos Leach, APRN - NP [facility]   3/29/2024  1:00 PM Tomasa Eisenmenger, MD [facility]       CT of the head 10/6/2023    IMPRESSION:  Mild central and cortical cerebral atrophy.      Mild chronic deep white matter ischemic changes    Patient is almost wheelchair-bound

## 2023-11-29 NOTE — PROGRESS NOTES
Patient discharged home with all belongings accompanied by son and daughter via wheelchair. Discharge instructions given verbalized understanding, all questions answered. Discharged via wheelchair. Latest vital signs prior home BP:130/86 HR:108 spo2:95 on room air. No complaints.

## 2023-11-29 NOTE — TELEPHONE ENCOUNTER
Patients daughter calls stating patient was the hospital recently( see encounter on 11/27/23) Principle problem was CAD. Patient discharged with instructions to follow with cardiology. Patient scheduled with them 12/15/23 but they are requesting patient have a carotid doppler done prior to appt and advised to them ask PCP. Are we able to order that for her?

## 2023-11-29 NOTE — CARE COORDINATION
Care Transitions Initial Follow Up Call    Call within 2 business days of discharge: Yes    Patient Current Location:  Home: [address]  [address] [facility] [address]    Care Transition Nurse contacted the family by telephone to perform post hospital discharge assessment. Verified name and  with family as identifiers. Provided introduction to self, and explanation of the Care Transition Nurse role. Patient: Army Reilly Patient : 1950   MRN: [id number]  Reason for Admission:   Discharge Date: 23 RARS: Readmission Risk Score: 22      Last Discharge Facility       Date Complaint Diagnosis Description Type Department Provider    23  S/P primary angioplasty with coronary stent . .. Admission (Discharged) [facility] 2 Eliane Todd MD            Was this an external facility discharge? No Discharge Facility: [facility]    Challenges to be reviewed by the provider   Additional needs identified to be addressed with provider: No  none               Method of communication with provider: none. Writer received call back from patient's daughter Bronwyn Kilpatrick, reviewed discharge instructions and medications, 1111F order completed Patient is doing ok, denied any c/o fever, chills, n/v/d, sob or chest pain, patient is just really tired today, didn't sleep well in the hospital, picking up Plavix today or tomorrow, patient has dialysis on --sat at [facility] in [place], going  to follow with cardiac rehab, has HFU appt with pcp on 23, declined RPM at this time, daughter calling today to scheduled f/u with cardiology, explained role of CTN, provided contact information, will follow//JU    Care Transition Nurse reviewed discharge instructions, medical action plan, and red flags with family who verbalized understanding. The family was given an opportunity to ask questions and does not have any further questions or concerns at this time. Were discharge instructions available to patient? Yes.  Reviewed appropriate Dr. Greer notified of pt's current stable vitals, brisk equal pupil response, follows commands with equal strength of hands and feet. Pt does not answer orientation question; just stares at nurse when questions are being asked. MD stated she will come to bedside to assess patient. Will continue to monitor.

## 2023-11-29 NOTE — CARE COORDINATION
Care Transitions Outreach Attempt    Call within 2 business days of discharge: Yes   Attempted to reach patient for transitions of care follow up. Unable to reach patient. Patient: Army Reilly Patient : 1950 MRN: [id number]    Last Discharge Facility       Date Complaint Diagnosis Description Type Department Provider    23  S/P primary angioplasty with coronary stent . .. Admission (Discharged) [facility] 2 Eliane Todd MD          # 1 attempt-Attempted initial 24 hour hospital follow up call. Left a Hipaa compliant message with name and call back information. Requested return call to [phone]. Was this an external facility discharge?  No Discharge Facility Name: [facility]    Noted following upcoming appointments from discharge chart review:   [facility] follow up appointment(s):   Future Appointments   Date Time Provider [address] [place] [address]   2023  3:30 PM MD mary Lujan sc [facility]   2023  1:45 PM Sharyn Holter, MD [facility] W[facility]   2023  1:00 PM DO Brittany Cadet [facility] Neurology -   2023  3:30 PM DO Benny Mckinley Neuro [facility]   3/29/2024  1:00 PM Linnea Alvarenga MD fp sc [facility]     Non-[facility]  follow up appointment(s):

## 2023-12-01 ENCOUNTER — OFFICE VISIT (OUTPATIENT)
Dept: FAMILY MEDICINE CLINIC | Age: 73
End: 2023-12-01

## 2023-12-01 DIAGNOSIS — I48.19 PERSISTENT ATRIAL FIBRILLATION (HCC): ICD-10-CM

## 2023-12-01 DIAGNOSIS — Z71.89 ADVANCE CARE PLANNING: ICD-10-CM

## 2023-12-01 DIAGNOSIS — Z99.2 TYPE 2 DIABETES MELLITUS WITH CHRONIC KIDNEY DISEASE ON CHRONIC DIALYSIS, WITHOUT LONG-TERM CURRENT USE OF INSULIN (HCC): ICD-10-CM

## 2023-12-01 DIAGNOSIS — J20.9 ACUTE BRONCHITIS DUE TO INFECTION: ICD-10-CM

## 2023-12-01 DIAGNOSIS — I25.10 CORONARY ARTERY DISEASE INVOLVING NATIVE CORONARY ARTERY OF NATIVE HEART WITHOUT ANGINA PECTORIS: ICD-10-CM

## 2023-12-01 DIAGNOSIS — J96.11 CHRONIC RESPIRATORY FAILURE WITH HYPOXIA (HCC): ICD-10-CM

## 2023-12-01 DIAGNOSIS — E11.22 TYPE 2 DIABETES MELLITUS WITH CHRONIC KIDNEY DISEASE ON CHRONIC DIALYSIS, WITHOUT LONG-TERM CURRENT USE OF INSULIN (HCC): ICD-10-CM

## 2023-12-01 DIAGNOSIS — I50.42 CHRONIC COMBINED SYSTOLIC AND DIASTOLIC CHF (CONGESTIVE HEART FAILURE) (HCC): Primary | ICD-10-CM

## 2023-12-01 DIAGNOSIS — Z23 ENCOUNTER FOR IMMUNIZATION: ICD-10-CM

## 2023-12-01 DIAGNOSIS — E78.2 MIXED HYPERLIPIDEMIA: ICD-10-CM

## 2023-12-01 DIAGNOSIS — Z09 HOSPITAL DISCHARGE FOLLOW-UP: ICD-10-CM

## 2023-12-01 DIAGNOSIS — E44.1 MILD MALNUTRITION (HCC): ICD-10-CM

## 2023-12-01 DIAGNOSIS — B00.1 COLD SORE: ICD-10-CM

## 2023-12-01 DIAGNOSIS — N18.6 TYPE 2 DIABETES MELLITUS WITH CHRONIC KIDNEY DISEASE ON CHRONIC DIALYSIS, WITHOUT LONG-TERM CURRENT USE OF INSULIN (HCC): ICD-10-CM

## 2023-12-01 RX ORDER — VALACYCLOVIR HYDROCHLORIDE 500 MG/1
500 TABLET, FILM COATED ORAL DAILY
Qty: 10 TABLET | Refills: 0 | Status: SHIPPED | OUTPATIENT
Start: 2023-12-01 | End: 2023-12-11

## 2023-12-01 RX ORDER — ACYCLOVIR 50 MG/G
OINTMENT TOPICAL
Qty: 30 G | Refills: 0 | Status: SHIPPED | OUTPATIENT
Start: 2023-12-01

## 2023-12-01 RX ORDER — BNT162B2 0.23 MG/2.25ML
0.3 INJECTION, SUSPENSION INTRAMUSCULAR ONCE
Qty: 0.3 ML | Refills: 0 | Status: SHIPPED | OUTPATIENT
Start: 2023-12-01 | End: 2023-12-01

## 2023-12-01 RX ORDER — AZITHROMYCIN 250 MG/1
TABLET, FILM COATED ORAL
Qty: 6 TABLET | Refills: 0 | Status: SHIPPED | OUTPATIENT
Start: 2023-12-01 | End: 2023-12-06

## 2023-12-01 RX ORDER — CLOPIDOGREL BISULFATE 75 MG/1
75 TABLET ORAL DAILY
Qty: 90 TABLET | Refills: 3 | Status: SHIPPED | OUTPATIENT
Start: 2023-12-01

## 2023-12-01 SDOH — ECONOMIC STABILITY: INCOME INSECURITY: HOW HARD IS IT FOR YOU TO PAY FOR THE VERY BASICS LIKE FOOD, HOUSING, MEDICAL CARE, AND HEATING?: NOT HARD AT ALL

## 2023-12-01 SDOH — ECONOMIC STABILITY: FOOD INSECURITY: WITHIN THE PAST 12 MONTHS, YOU WORRIED THAT YOUR FOOD WOULD RUN OUT BEFORE YOU GOT MONEY TO BUY MORE.: NEVER TRUE

## 2023-12-01 SDOH — ECONOMIC STABILITY: FOOD INSECURITY: WITHIN THE PAST 12 MONTHS, THE FOOD YOU BOUGHT JUST DIDN'T LAST AND YOU DIDN'T HAVE MONEY TO GET MORE.: NEVER TRUE

## 2023-12-01 ASSESSMENT — PATIENT HEALTH QUESTIONNAIRE - PHQ9
SUM OF ALL RESPONSES TO PHQ QUESTIONS 1-9: 0
SUM OF ALL RESPONSES TO PHQ QUESTIONS 1-9: 2
SUM OF ALL RESPONSES TO PHQ9 QUESTIONS 1 & 2: 0
2. FEELING DOWN, DEPRESSED OR HOPELESS: 0
SUM OF ALL RESPONSES TO PHQ QUESTIONS 1-9: 2
SUM OF ALL RESPONSES TO PHQ QUESTIONS 1-9: 0
SUM OF ALL RESPONSES TO PHQ QUESTIONS 1-9: 2
SUM OF ALL RESPONSES TO PHQ QUESTIONS 1-9: 0
1. LITTLE INTEREST OR PLEASURE IN DOING THINGS: 1
SUM OF ALL RESPONSES TO PHQ9 QUESTIONS 1 & 2: 2
1. LITTLE INTEREST OR PLEASURE IN DOING THINGS: 0
SUM OF ALL RESPONSES TO PHQ QUESTIONS 1-9: 0
2. FEELING DOWN, DEPRESSED OR HOPELESS: 1
SUM OF ALL RESPONSES TO PHQ QUESTIONS 1-9: 2

## 2023-12-01 ASSESSMENT — ENCOUNTER SYMPTOMS
DIARRHEA: 0
COUGH: 0
SHORTNESS OF BREATH: 1
NAUSEA: 0
CONSTIPATION: 1
BLOOD IN STOOL: 0
CHEST TIGHTNESS: 0
WHEEZING: 0
VOMITING: 0
ABDOMINAL PAIN: 0
ABDOMINAL DISTENTION: 0

## 2023-12-01 ASSESSMENT — ANXIETY QUESTIONNAIRES
IF YOU CHECKED OFF ANY PROBLEMS ON THIS QUESTIONNAIRE, HOW DIFFICULT HAVE THESE PROBLEMS MADE IT FOR YOU TO DO YOUR WORK, TAKE CARE OF THINGS AT HOME, OR GET ALONG WITH OTHER PEOPLE: EXTREMELY DIFFICULT
6. BECOMING EASILY ANNOYED OR IRRITABLE: 1
4. TROUBLE RELAXING: 1
7. FEELING AFRAID AS IF SOMETHING AWFUL MIGHT HAPPEN: 1
2. NOT BEING ABLE TO STOP OR CONTROL WORRYING: 1
GAD7 TOTAL SCORE: 7
3. WORRYING TOO MUCH ABOUT DIFFERENT THINGS: 1
5. BEING SO RESTLESS THAT IT IS HARD TO SIT STILL: 1
1. FEELING NERVOUS, ANXIOUS, OR ON EDGE: 1

## 2023-12-01 NOTE — PROGRESS NOTES
range of motion. Cervical back: Normal range of motion and neck supple. Right lower leg: No edema. Left lower leg: No edema. Comments: Thoracic kyphosis   Skin:     General: Skin is warm and dry. Capillary Refill: Capillary refill takes less than 2 seconds. Findings: Bruising and rash present. Comments: bilateral stasis dermatitis, very scally, lots of bruising on the hands and forearms   Neurological:      Mental Status: She is alert and oriented to person, place, and time. Cranial Nerves: No cranial nerve deficit. Motor: No abnormal muscle tone. Gait: Gait abnormal.      Comments: In wheelchair   Psychiatric:         Attention and Perception: Attention normal.         Mood and Affect: Mood is anxious. Affect is labile. Behavior: Behavior normal.         Thought Content: Thought content normal.         Cognition and Memory: Cognition normal.         Judgment: Judgment normal.         Most recent labs reviewed with the patient and all questions fully answered.    Anemia  Hyperglycemia  Increased liver function tests   End-stage kidney disease  Otherwise labs within normal limits        Lab Results   Component Value Date    WBC 5.6 10/06/2023    HGB 10.3 (L) 10/06/2023    HCT 32.2 (L) 10/06/2023    MCV 98.2 10/06/2023     11/27/2023       Lab Results   Component Value Date/Time     11/28/2023 06:31 AM    K 4.9 11/28/2023 06:31 AM    CL 94 11/28/2023 06:31 AM    CO2 22 11/28/2023 06:31 AM    BUN 47 11/28/2023 06:31 AM    CREATININE 4.6 11/28/2023 06:31 AM    GLUCOSE 140 11/28/2023 06:31 AM    CALCIUM 9.1 11/28/2023 06:31 AM        Lab Results   Component Value Date    ALT 21 10/06/2023    AST 32 (H) 10/06/2023    ALKPHOS 127 (H) 10/06/2023    BILITOT 0.4 10/06/2023       Lab Results   Component Value Date    TSH 1.67 10/06/2023       Lab Results   Component Value Date    CHOL 116 09/19/2022    CHOL 135 12/06/2021    CHOL 117 07/27/2020     Lab

## 2023-12-02 LAB — STRESS TARGET HR: 146 BPM

## 2023-12-03 VITALS
BODY MASS INDEX: 26.13 KG/M2 | WEIGHT: 129.63 LBS | HEIGHT: 59 IN | TEMPERATURE: 97.4 F | SYSTOLIC BLOOD PRESSURE: 128 MMHG | OXYGEN SATURATION: 99 % | HEART RATE: 110 BPM | DIASTOLIC BLOOD PRESSURE: 64 MMHG

## 2023-12-03 PROBLEM — N18.6 ESRD (END STAGE RENAL DISEASE) (HCC): Status: RESOLVED | Noted: 2022-11-25 | Resolved: 2023-12-03

## 2023-12-03 PROBLEM — E78.2 MIXED HYPERLIPIDEMIA: Status: ACTIVE | Noted: 2023-12-03

## 2023-12-03 NOTE — DISCHARGE INSTRUCTIONS
information contained in the After Visit Summary has been reviewed with me, the patient and/or responsible adult, by my health care provider(s). I had the opportunity to ask questions regarding this information.  I have elected to receive;      []After Visit Summary  [x]Comprehensive Discharge Instruction        Patient signature______________________________________Date:________      Electronically signed by Steffanie Mendoza MD on 12/4/2023 at 1:59 PM  Electronically signed by David Chatman RN on 12/4/2023 at 2:29 PM

## 2023-12-04 ENCOUNTER — HOSPITAL ENCOUNTER (OUTPATIENT)
Dept: WOUND CARE | Age: 73
Discharge: HOME | End: 2023-12-04
Payer: MEDICARE

## 2023-12-04 VITALS
TEMPERATURE: 98.5 F | HEART RATE: 95 BPM | BODY MASS INDEX: 26 KG/M2 | RESPIRATION RATE: 20 BRPM | SYSTOLIC BLOOD PRESSURE: 163 MMHG | DIASTOLIC BLOOD PRESSURE: 67 MMHG | WEIGHT: 129 LBS | HEIGHT: 59 IN

## 2023-12-04 DIAGNOSIS — N18.6 TYPE 2 DIABETES MELLITUS WITH CHRONIC KIDNEY DISEASE ON CHRONIC DIALYSIS, WITHOUT LONG-TERM CURRENT USE OF INSULIN (HCC): ICD-10-CM

## 2023-12-04 DIAGNOSIS — R53.1 DECREASED STRENGTH, ENDURANCE, AND MOBILITY: ICD-10-CM

## 2023-12-04 DIAGNOSIS — E11.22 TYPE 2 DIABETES MELLITUS WITH CHRONIC KIDNEY DISEASE ON CHRONIC DIALYSIS, WITHOUT LONG-TERM CURRENT USE OF INSULIN (HCC): ICD-10-CM

## 2023-12-04 DIAGNOSIS — R68.89 DECREASED STRENGTH, ENDURANCE, AND MOBILITY: ICD-10-CM

## 2023-12-04 DIAGNOSIS — Z74.09 DECREASED STRENGTH, ENDURANCE, AND MOBILITY: ICD-10-CM

## 2023-12-04 DIAGNOSIS — Z99.2 TYPE 2 DIABETES MELLITUS WITH CHRONIC KIDNEY DISEASE ON CHRONIC DIALYSIS, WITHOUT LONG-TERM CURRENT USE OF INSULIN (HCC): ICD-10-CM

## 2023-12-04 DIAGNOSIS — L89.153 PRESSURE ULCER OF COCCYGEAL REGION, STAGE III (HCC): Primary | ICD-10-CM

## 2023-12-04 PROCEDURE — 11042 DBRDMT SUBQ TIS 1ST 20SQCM/<: CPT

## 2023-12-04 PROCEDURE — 11042 DBRDMT SUBQ TIS 1ST 20SQCM/<: CPT | Performed by: PLASTIC SURGERY

## 2023-12-04 RX ORDER — LIDOCAINE HYDROCHLORIDE 40 MG/ML
SOLUTION TOPICAL ONCE
Status: COMPLETED | OUTPATIENT
Start: 2023-12-04 | End: 2023-12-04

## 2023-12-04 RX ORDER — LIDOCAINE HYDROCHLORIDE 20 MG/ML
JELLY TOPICAL ONCE
Status: DISCONTINUED | OUTPATIENT
Start: 2023-12-04 | End: 2023-12-05 | Stop reason: HOSPADM

## 2023-12-04 RX ORDER — LIDOCAINE HYDROCHLORIDE 20 MG/ML
JELLY TOPICAL ONCE
OUTPATIENT
Start: 2023-12-04 | End: 2023-12-04

## 2023-12-04 RX ORDER — LIDOCAINE HYDROCHLORIDE 40 MG/ML
SOLUTION TOPICAL ONCE
OUTPATIENT
Start: 2023-12-04 | End: 2023-12-04

## 2023-12-04 RX ADMIN — LIDOCAINE HYDROCHLORIDE 5 ML: 40 SOLUTION TOPICAL at 13:55

## 2023-12-04 ASSESSMENT — PAIN SCALES - GENERAL: PAINLEVEL_OUTOF10: 0

## 2023-12-04 NOTE — PROGRESS NOTES
[address]:     Other [address]:     [address] [facility]  [address] [place] [address] [address]  [phone]  WOUND CARE Dept: [phone]   FAX NATY [unfilled]    Patient Information:      [place] [id number]   [phone]   : 1950  AGE: 75 y.o. GENDER: female   TODAYS DATE:  2023    Insurance:      PRIMARY INSURANCE:  Plan: MEDICARE PART A AND B  Coverage: MEDICARE  Effective Date: 2014  [id number] - (Medicare)    SECONDARY INSURANCE:  Plan: [employer] [place]  Coverage: [employer]  Effective Date: 1990  [unfilled]    [unfilled]   [unfilled]     Patient Wound Information:      Problem List Items Addressed This Visit          Endocrine    Type 2 diabetes mellitus with chronic kidney disease on chronic dialysis, without long-term current use of insulin ([address])    Relevant Medications    lidocaine (XYLOCAINE) 2 % uro-jet (Start on 2023  2:45 PM)    Other Relevant Orders    Initiate Outpatient Wound Care Protocol    Neg. Pressure Wound Therapy       Other    Decreased strength, endurance, and mobility    Relevant Medications    lidocaine (XYLOCAINE) 2 % uro-jet (Start on 2023  2:45 PM)    Other Relevant Orders    Initiate Outpatient Wound Care Protocol    Neg. Pressure Wound Therapy    Pressure ulcer of coccygeal region, stage III (HCC) - Primary    Relevant Medications    lidocaine (XYLOCAINE) 2 % uro-jet (Start on 2023  2:45 PM)    Other Relevant Orders    Initiate Outpatient Wound Care Protocol    Neg. Pressure Wound Therapy       WOUNDS REQUIRING DRESSING SUPPLIES:     Wound 10/18/23 Coccyx wound #1 coccyx (Active)   Wound Image   23 1421   Wound Etiology Pressure Stage 3 23 1421   Dressing Status New drainage noted; Old drainage noted 23 1421   Wound Cleansed Soap and water 23 142   Dressing/Treatment Foam 23 1600   Wound Length (cm) 0.4 cm 23 142
3  / 10     Post Procedural Pain:  0 / 10     Response to treatment:  With complaints of pain.          Imaging:   [unfilled]        Impression/Plan:     Problem List Items Addressed This Visit          Endocrine    Type 2 diabetes mellitus with chronic kidney disease on chronic dialysis, without long-term current use of insulin ([address])    Relevant Orders    Initiate Outpatient Wound Care Protocol       Other    Decreased strength, endurance, and mobility    Relevant Orders    Initiate Outpatient Wound Care Protocol    Pressure ulcer of coccygeal region, stage III ([address]) - Primary    Relevant Orders    Initiate Outpatient Wound Care Protocol         Patient Active Problem List   Diagnosis    Vitamin D deficiency    Venous insufficiency of both lower extremities    Type 2 diabetes mellitus with chronic kidney disease on chronic dialysis, without long-term current use of insulin ([address])    Coronary artery disease involving native coronary artery of native heart without angina pectoris    Hypertension, essential    Iron deficiency anemia    Colonoscopy refused    Bilateral hearing loss    COPD (chronic obstructive pulmonary disease) (HCC)    Gout with tophi    Dyslipidemia    Metabolic encephalopathy    E. coli UTI    Meningioma (HCC)    Kidney stones    Diverticulosis    COVID-19 vaccination declined    Chronic venous stasis dermatitis of both lower extremities    Family history of colon cancer    Family history of pancreatic cancer    Mild malnutrition (HCC)    Decreased strength, endurance, and mobility    Lymphedema    PVD (peripheral vascular disease) (HCC)    ESRD on dialysis ([address])    Hypertensive heart and chronic kidney disease with heart failure and with stage 5 chronic kidney disease, or end stage renal disease (HCC)    Gastroesophageal reflux disease    Moderate anxiety    Other age-related cataract    History of GI bleed    Chronic combined systolic and diastolic CHF (congestive heart failure) ([address])

## 2023-12-04 NOTE — PLAN OF CARE
Problem: Chronic Conditions and Co-morbidities  Goal: Patient's chronic conditions and co-morbidity symptoms are monitored and maintained or improved  Outcome: Progressing     Problem: Falls - Risk of:  Goal: Will remain free from falls  Description: Will remain free from falls  Outcome: Progressing     Problem: Pressure Ulcer:  Goal: Signs of wound healing will improve  Description: Signs of wound healing will improve  Outcome: Progressing  Goal: Absence of new pressure ulcer  Description: Absence of new pressure ulcer  Outcome: Progressing

## 2023-12-06 ENCOUNTER — CARE COORDINATION (OUTPATIENT)
Dept: CASE MANAGEMENT | Age: 73
End: 2023-12-06

## 2023-12-06 ENCOUNTER — OFFICE VISIT (OUTPATIENT)
Dept: NEUROLOGY | Age: 73
End: 2023-12-06
Payer: MEDICARE

## 2023-12-06 VITALS
SYSTOLIC BLOOD PRESSURE: 128 MMHG | WEIGHT: 129 LBS | DIASTOLIC BLOOD PRESSURE: 78 MMHG | BODY MASS INDEX: 26 KG/M2 | HEART RATE: 78 BPM | HEIGHT: 59 IN

## 2023-12-06 DIAGNOSIS — R56.9 SEIZURE-LIKE ACTIVITY (HCC): Primary | ICD-10-CM

## 2023-12-06 PROCEDURE — 3078F DIAST BP <80 MM HG: CPT

## 2023-12-06 PROCEDURE — G8399 PT W/DXA RESULTS DOCUMENT: HCPCS

## 2023-12-06 PROCEDURE — 1111F DSCHRG MED/CURRENT MED MERGE: CPT

## 2023-12-06 PROCEDURE — G8484 FLU IMMUNIZE NO ADMIN: HCPCS

## 2023-12-06 PROCEDURE — G8417 CALC BMI ABV UP PARAM F/U: HCPCS

## 2023-12-06 PROCEDURE — 1123F ACP DISCUSS/DSCN MKR DOCD: CPT

## 2023-12-06 PROCEDURE — G8427 DOCREV CUR MEDS BY ELIG CLIN: HCPCS

## 2023-12-06 PROCEDURE — 1090F PRES/ABSN URINE INCON ASSESS: CPT

## 2023-12-06 PROCEDURE — 3017F COLORECTAL CA SCREEN DOC REV: CPT

## 2023-12-06 PROCEDURE — 99214 OFFICE O/P EST MOD 30 MIN: CPT

## 2023-12-06 PROCEDURE — 1036F TOBACCO NON-USER: CPT

## 2023-12-06 PROCEDURE — 3074F SYST BP LT 130 MM HG: CPT

## 2023-12-06 ASSESSMENT — ENCOUNTER SYMPTOMS
TROUBLE SWALLOWING: 0
CHEST TIGHTNESS: 0
WHEEZING: 0
COLOR CHANGE: 0
SHORTNESS OF BREATH: 0
BACK PAIN: 0
VOMITING: 0
VOICE CHANGE: 0
NAUSEA: 0

## 2023-12-06 NOTE — PROGRESS NOTES
later time. PLAN:     Continue Lamictal 25 mg twice daily. Patient advised to follow-up with neurosurgery for management of right temporal lobe extra-axial mass. Follow up in the clinic in 6 months. Instructed patient to call the clinic if symptoms worsen or develop any new symptoms. Ms. Sathya Yap received counseling on the following healthy behaviors: medical compliance, smoking cessation, blood pressure control, regular follow up with primary doctor. I have spent 60 minutes face to face with the patient more than 50% of this time was spent counseling and coordinating care.       Electronically signed by Frantz Davila DO on 12/6/2023 at 12:55 PM

## 2023-12-06 NOTE — CARE COORDINATION
Care Transitions Outreach Attempt    Call within 2 business days of discharge: Yes   Attempted to reach patient for transitions of care follow up. Unable to reach patient. Patient: Radha Spencer Patient : 1950 MRN: [id number]    Last Discharge Facility       Date Complaint Diagnosis Description Type Department Provider    23  S/P primary angioplasty with coronary stent . .. Admission (Discharged) Eyal Sun 2 Jez Aldridge MD          # 1 attempt-Attempted to reach patient for subsequent call. Left Hipaa appropriate message with contact information requesting return call to [phone]     Was this an external facility discharge?  No Discharge Facility Name: [facility]    Noted following upcoming appointments from discharge chart review:   [facility] follow up appointment(s):   Future Appointments   Date Time Provider [facility] [address]   2023  1:00 PM Frantz Davila DO Neuro [facility] Neurology -   2023  3:30 PM Waylon Barrow DO Benny Neuro [facility]   12/15/2023  2:30 PM Mariana Leach APRN - NP [facility]   2023  1:30 PM Jones Jacobsen MD [facility]   3/1/2024  2:00 PM Carrillo Yung MD fp sc [facility]   3/29/2024  1:00 PM Carrillo Yung MD fp sc [facility]     Non-[facility]  follow up appointment(s):

## 2023-12-07 ENCOUNTER — CARE COORDINATION (OUTPATIENT)
Dept: CASE MANAGEMENT | Age: 73
End: 2023-12-07

## 2023-12-07 NOTE — CARE COORDINATION
Care Transitions Outreach Attempt #2      Attempt #2 to reach patient for transitions of care follow up. Unable to reach patient/daughter. Requested call back. CTN sign off for transitions if no return call received. Will refer to [facility] for care management attempt. Patient: Mary Greer Patient : 1950 MRN: [id number]    Last Discharge Facility       Date Complaint Diagnosis Description Type Department Provider    23  S/P primary angioplasty with coronary stent . .. Admission (Discharged) [facility] 2 Sharlette Felty, MD            Noted following upcoming appointments from discharge chart review:   [facility] follow up appointment(s):   Future Appointments   Date Time Provider [address]   2023  3:30 PM Michael Kelley DO [place] Neuro [facility]   12/15/2023  2:30 PM Shayne Leach, APRN - NP AFL [facility] C   2023  1:30 PM Jose Martin Pollard MD [facility] WND CAR [facility]   3/1/2024  2:00 PM [facility]   3/29/2024  1:00 PM Scott Burgos MD fp sc [facility]   2024  1:30 PM Melvin Barber DO [facility] Neurology -       Junito Shields RN BSN [facility]  Care Transitions Nurse  [phone]   [email or web address]

## 2023-12-11 DIAGNOSIS — I25.10 CORONARY ARTERY DISEASE INVOLVING NATIVE CORONARY ARTERY OF NATIVE HEART WITHOUT ANGINA PECTORIS: ICD-10-CM

## 2023-12-11 DIAGNOSIS — Z99.2 HYPERTENSIVE CKD, ESRD ON DIALYSIS (HCC): ICD-10-CM

## 2023-12-11 DIAGNOSIS — I12.0 HYPERTENSIVE CKD, ESRD ON DIALYSIS (HCC): ICD-10-CM

## 2023-12-11 DIAGNOSIS — I48.0 PAROXYSMAL ATRIAL FIBRILLATION (HCC): ICD-10-CM

## 2023-12-11 DIAGNOSIS — N18.6 HYPERTENSIVE CKD, ESRD ON DIALYSIS (HCC): ICD-10-CM

## 2023-12-11 RX ORDER — CLOPIDOGREL BISULFATE 75 MG/1
75 TABLET ORAL DAILY
Qty: 90 TABLET | Refills: 0 | Status: SHIPPED | OUTPATIENT
Start: 2023-12-11 | End: 2023-12-15 | Stop reason: SDUPTHER

## 2023-12-11 RX ORDER — FUROSEMIDE 80 MG
80 TABLET ORAL EVERY OTHER DAY
Qty: 90 TABLET | Refills: 3 | Status: SHIPPED | OUTPATIENT
Start: 2023-12-11

## 2023-12-11 RX ORDER — AMIODARONE HYDROCHLORIDE 100 MG/1
100 TABLET ORAL EVERY MORNING
Qty: 90 TABLET | Refills: 3 | Status: SHIPPED | OUTPATIENT
Start: 2023-12-11

## 2023-12-11 RX ORDER — ASPIRIN 81 MG/1
81 TABLET ORAL DAILY
Qty: 90 TABLET | Refills: 3 | Status: SHIPPED | OUTPATIENT
Start: 2023-12-11

## 2023-12-11 NOTE — TELEPHONE ENCOUNTER
Please Approve or Refuse.   Send to Pharmacy per Pt's Request:       Next Visit Date:  3/1/2024   Last Visit Date: 12/1/2023    Hemoglobin A1C (%)   Date Value   10/16/2023 5.5   05/10/2023 5.7   11/11/2022 6.4             ( goal A1C is < 7)   BP Readings from Last 3 Encounters:   12/06/23 128/78   12/04/23 (!) 163/67   12/01/23 128/64          (goal 120/80)  BUN   Date Value Ref Range Status   11/28/2023 47 (H) 8 - 23 mg/dL Final     Creatinine   Date Value Ref Range Status   11/28/2023 4.6 (H) 0.5 - 0.9 mg/dL Final     Potassium   Date Value Ref Range Status   11/28/2023 4.9 3.7 - 5.3 mmol/L Final

## 2023-12-29 NOTE — DISCHARGE INSTRUCTIONS
[facility] WOUND and HYPERBARIC TREATMENT  CENTER                            Visit  Discharge Instructions / Physician Orders  DATE: 1/3/24  Home Care: none     SUPPLIES ORDERED THRU: [employer]                  DATE LAST SUPPLIED 10/18/23     Wound Location:  coccyx, bilat lower legs     Cleanse with: Liquid antibacterial soap and water, rinse well      Dressing Orders:  Primary dressing       lori,          Secondary dressing    4x4 zetivite bordered silicone dressing daily                       secure with           x 30days                                  Bilat lower legs lori and silicone border dressing wrap legs in ace wraps toes to knee  Frequency:  daily     Additional Orders: Increase protein to diet (meat, cheese, eggs, fish, peanut butter, nuts and beans)  Multivitamin daily     OFFLOADING [x] YES  TYPE:                  [] NA      Gel cushion for bed, Patient Refuses      Weekly wound care visits until determined otherwise.     Antibiotic therapy-wound care related YES [] NO [] NA[x]     MY CHART []     Smart Device  []      HYPERBARIC TREATMENT-                TREATMENT #                          Your next appointment with the Wound Care Center is January 3, 2024 at 11:00 am                                                                                                   (Please note your next appointment above and if you are unable to keep, kindly give a 24 hour notice. Thank you.)  If more than 15 min late we cannot guarantee you will be seen due to clinician schedule  Per Policy, Excessive cancellation will call for dismissal from program.  If you experience any of the following, please call the Wound Care Center during business hours:  [phone]     * Increase in Pain  * Temperature over 101  * Increase in drainage from your wound  * Drainage with a foul odor  * Bleeding  * Increase in swelling  * Need for compression bandage changes due to slippage, breakthrough drainage.     If you

## 2023-12-31 ENCOUNTER — HOSPITAL ENCOUNTER (INPATIENT)
Age: 73
LOS: 5 days | Discharge: HOME | End: 2024-01-05
Attending: EMERGENCY MEDICINE | Admitting: INTERNAL MEDICINE
Payer: MEDICARE

## 2023-12-31 ENCOUNTER — APPOINTMENT (OUTPATIENT)
Dept: GENERAL RADIOLOGY | Age: 73
End: 2023-12-31
Payer: MEDICARE

## 2023-12-31 ENCOUNTER — APPOINTMENT (OUTPATIENT)
Dept: CT IMAGING | Age: 73
End: 2023-12-31
Payer: MEDICARE

## 2023-12-31 DIAGNOSIS — N30.01 ACUTE CYSTITIS WITH HEMATURIA: ICD-10-CM

## 2023-12-31 DIAGNOSIS — R65.20 SEPSIS WITH ENCEPHALOPATHY WITHOUT SEPTIC SHOCK, DUE TO UNSPECIFIED ORGANISM (HCC): ICD-10-CM

## 2023-12-31 DIAGNOSIS — G93.40 SEPSIS WITH ENCEPHALOPATHY WITHOUT SEPTIC SHOCK, DUE TO UNSPECIFIED ORGANISM (HCC): ICD-10-CM

## 2023-12-31 DIAGNOSIS — L89.309 PRESSURE INJURY OF SKIN OF BUTTOCK, UNSPECIFIED INJURY STAGE, UNSPECIFIED LATERALITY: ICD-10-CM

## 2023-12-31 DIAGNOSIS — R41.82 ALTERED MENTAL STATUS, UNSPECIFIED ALTERED MENTAL STATUS TYPE: Primary | ICD-10-CM

## 2023-12-31 DIAGNOSIS — A41.9 SEPSIS WITH ENCEPHALOPATHY WITHOUT SEPTIC SHOCK, DUE TO UNSPECIFIED ORGANISM (HCC): ICD-10-CM

## 2023-12-31 PROBLEM — N39.0 SEPSIS DUE TO URINARY TRACT INFECTION (HCC): Status: ACTIVE | Noted: 2023-12-31

## 2023-12-31 LAB
ABSOLUTE BANDS #: 0.71 K/UL (ref 0–1)
ALBUMIN SERPL-MCNC: 3.6 G/DL (ref 3.5–5.2)
ALP SERPL-CCNC: 134 U/L (ref 35–104)
ALT SERPL-CCNC: 13 U/L (ref 5–33)
ANION GAP SERPL CALCULATED.3IONS-SCNC: 16 MMOL/L (ref 9–17)
ANTI-XA UNFRAC HEPARIN: 0.27 IU/L (ref 0.3–0.7)
ANTI-XA UNFRAC HEPARIN: <0.1 IU/L (ref 0.3–0.7)
AST SERPL-CCNC: 25 U/L
BACTERIA URNS QL MICRO: ABNORMAL
BANDS: 4 % (ref 0–10)
BASOPHILS # BLD: 0 K/UL (ref 0–0.2)
BASOPHILS NFR BLD: 0 % (ref 0–2)
BILIRUB SERPL-MCNC: 0.8 MG/DL (ref 0.3–1.2)
BILIRUB UR QL STRIP: NEGATIVE
BUN SERPL-MCNC: 18 MG/DL (ref 8–23)
CALCIUM SERPL-MCNC: 9 MG/DL (ref 8.6–10.4)
CASTS #/AREA URNS LPF: ABNORMAL /LPF
CASTS #/AREA URNS LPF: ABNORMAL /LPF
CHLORIDE SERPL-SCNC: 97 MMOL/L (ref 98–107)
CLARITY UR: ABNORMAL
CO2 SERPL-SCNC: 27 MMOL/L (ref 20–31)
COLOR UR: ABNORMAL
CREAT SERPL-MCNC: 2.9 MG/DL (ref 0.5–0.9)
EOSINOPHIL # BLD: 0 K/UL (ref 0–0.4)
EOSINOPHILS RELATIVE PERCENT: 0 % (ref 0–4)
EPI CELLS #/AREA URNS HPF: ABNORMAL /HPF
ERYTHROCYTE [DISTWIDTH] IN BLOOD BY AUTOMATED COUNT: 18.2 % (ref 11.5–14.9)
ERYTHROCYTE [DISTWIDTH] IN BLOOD BY AUTOMATED COUNT: 18.6 % (ref 11.5–14.9)
GFR SERPL CREATININE-BSD FRML MDRD: 16 ML/MIN/1.73M2
GLUCOSE SERPL-MCNC: 122 MG/DL (ref 70–99)
GLUCOSE UR STRIP-MCNC: NEGATIVE MG/DL
HCT VFR BLD AUTO: 35.3 % (ref 36–46)
HCT VFR BLD AUTO: 40.2 % (ref 36–46)
HGB BLD-MCNC: 11.2 G/DL (ref 12–16)
HGB BLD-MCNC: 13.1 G/DL (ref 12–16)
HGB UR QL STRIP.AUTO: ABNORMAL
INR PPP: 1
INR PPP: 1
KETONES UR STRIP-MCNC: NEGATIVE MG/DL
L PNEUMO1 AG UR QL IA.RAPID: NEGATIVE
LACTATE BLDV-SCNC: 1.7 MMOL/L (ref 0.5–1.9)
LACTATE BLDV-SCNC: 2.5 MMOL/L (ref 0.5–1.9)
LEUKOCYTE ESTERASE UR QL STRIP: ABNORMAL
LIPASE SERPL-CCNC: 10 U/L (ref 13–60)
LYMPHOCYTES NFR BLD: 1.25 K/UL (ref 1–4.8)
LYMPHOCYTES RELATIVE PERCENT: 7 % (ref 24–44)
MAGNESIUM SERPL-MCNC: 1.9 MG/DL (ref 1.6–2.6)
MCH RBC QN AUTO: 29.7 PG (ref 26–34)
MCH RBC QN AUTO: 30 PG (ref 26–34)
MCHC RBC AUTO-ENTMCNC: 31.6 G/DL (ref 31–37)
MCHC RBC AUTO-ENTMCNC: 32.6 G/DL (ref 31–37)
MCV RBC AUTO: 92 FL (ref 80–100)
MCV RBC AUTO: 93.8 FL (ref 80–100)
MONOCYTES NFR BLD: 0.89 K/UL (ref 0.1–1.3)
MONOCYTES NFR BLD: 5 % (ref 1–7)
MORPHOLOGY: ABNORMAL
NEUTROPHILS NFR BLD: 84 % (ref 36–66)
NEUTS SEG NFR BLD: 14.95 K/UL (ref 1.3–9.1)
NITRITE UR QL STRIP: NEGATIVE
PARTIAL THROMBOPLASTIN TIME: 28.7 SEC (ref 24–36)
PH UR STRIP: 8.5 [PH] (ref 5–8)
PLATELET # BLD AUTO: 131 K/UL (ref 150–450)
PLATELET # BLD AUTO: 168 K/UL (ref 150–450)
PMV BLD AUTO: 8 FL (ref 6–12)
PMV BLD AUTO: 8.3 FL (ref 6–12)
POTASSIUM SERPL-SCNC: 4.7 MMOL/L (ref 3.7–5.3)
PROCALCITONIN SERPL-MCNC: 0.3 NG/ML
PROT SERPL-MCNC: 5.9 G/DL (ref 6.4–8.3)
PROT UR STRIP-MCNC: ABNORMAL MG/DL
PROTHROMBIN TIME: 13.5 SEC (ref 11.8–14.6)
PROTHROMBIN TIME: 13.6 SEC (ref 11.8–14.6)
RBC # BLD AUTO: 3.77 M/UL (ref 4–5.2)
RBC # BLD AUTO: 4.37 M/UL (ref 4–5.2)
RBC #/AREA URNS HPF: ABNORMAL /HPF
S PNEUM AG SPEC QL: NEGATIVE
SODIUM SERPL-SCNC: 140 MMOL/L (ref 135–144)
SP GR UR STRIP: 1.01 (ref 1–1.03)
SPECIMEN SOURCE: NORMAL
T4 FREE SERPL-MCNC: 2 NG/DL (ref 0.9–1.7)
TROPONIN I SERPL HS-MCNC: 113 NG/L (ref 0–14)
TROPONIN I SERPL HS-MCNC: 122 NG/L (ref 0–14)
TSH SERPL DL<=0.05 MIU/L-ACNC: 2.73 UIU/ML (ref 0.3–5)
UROBILINOGEN UR STRIP-ACNC: NORMAL EU/DL (ref 0–1)
WBC #/AREA URNS HPF: ABNORMAL /HPF
WBC OTHER # BLD: 13 K/UL (ref 3.5–11)
WBC OTHER # BLD: 17.8 K/UL (ref 3.5–11)
YEAST URNS QL MICRO: ABNORMAL

## 2023-12-31 PROCEDURE — 85520 HEPARIN ASSAY: CPT

## 2023-12-31 PROCEDURE — 96365 THER/PROPH/DIAG IV INF INIT: CPT

## 2023-12-31 PROCEDURE — 87040 BLOOD CULTURE FOR BACTERIA: CPT

## 2023-12-31 PROCEDURE — 85025 COMPLETE CBC W/AUTO DIFF WBC: CPT

## 2023-12-31 PROCEDURE — 84145 PROCALCITONIN (PCT): CPT

## 2023-12-31 PROCEDURE — 86738 MYCOPLASMA ANTIBODY: CPT

## 2023-12-31 PROCEDURE — 87641 MR-STAPH DNA AMP PROBE: CPT

## 2023-12-31 PROCEDURE — 99285 EMERGENCY DEPT VISIT HI MDM: CPT

## 2023-12-31 PROCEDURE — 83605 ASSAY OF LACTIC ACID: CPT

## 2023-12-31 PROCEDURE — 6370000000 HC RX 637 (ALT 250 FOR IP)

## 2023-12-31 PROCEDURE — 6360000002 HC RX W HCPCS: Performed by: EMERGENCY MEDICINE

## 2023-12-31 PROCEDURE — 83735 ASSAY OF MAGNESIUM: CPT

## 2023-12-31 PROCEDURE — 80053 COMPREHEN METABOLIC PANEL: CPT

## 2023-12-31 PROCEDURE — 85027 COMPLETE CBC AUTOMATED: CPT

## 2023-12-31 PROCEDURE — 87449 NOS EACH ORGANISM AG IA: CPT

## 2023-12-31 PROCEDURE — 81001 URINALYSIS AUTO W/SCOPE: CPT

## 2023-12-31 PROCEDURE — 0202U NFCT DS 22 TRGT SARS-COV-2: CPT

## 2023-12-31 PROCEDURE — 96366 THER/PROPH/DIAG IV INF ADDON: CPT

## 2023-12-31 PROCEDURE — 84443 ASSAY THYROID STIM HORMONE: CPT

## 2023-12-31 PROCEDURE — 83690 ASSAY OF LIPASE: CPT

## 2023-12-31 PROCEDURE — 93005 ELECTROCARDIOGRAM TRACING: CPT | Performed by: EMERGENCY MEDICINE

## 2023-12-31 PROCEDURE — 87899 AGENT NOS ASSAY W/OPTIC: CPT

## 2023-12-31 PROCEDURE — 94640 AIRWAY INHALATION TREATMENT: CPT

## 2023-12-31 PROCEDURE — 51701 INSERT BLADDER CATHETER: CPT

## 2023-12-31 PROCEDURE — 85610 PROTHROMBIN TIME: CPT

## 2023-12-31 PROCEDURE — 85730 THROMBOPLASTIN TIME PARTIAL: CPT

## 2023-12-31 PROCEDURE — 2580000003 HC RX 258: Performed by: EMERGENCY MEDICINE

## 2023-12-31 PROCEDURE — 36415 COLL VENOUS BLD VENIPUNCTURE: CPT

## 2023-12-31 PROCEDURE — 6360000002 HC RX W HCPCS: Performed by: NURSE PRACTITIONER

## 2023-12-31 PROCEDURE — 2060000000 HC ICU INTERMEDIATE R&B

## 2023-12-31 PROCEDURE — 84484 ASSAY OF TROPONIN QUANT: CPT

## 2023-12-31 PROCEDURE — 84439 ASSAY OF FREE THYROXINE: CPT

## 2023-12-31 PROCEDURE — 87086 URINE CULTURE/COLONY COUNT: CPT

## 2023-12-31 PROCEDURE — 2580000003 HC RX 258

## 2023-12-31 PROCEDURE — 71045 X-RAY EXAM CHEST 1 VIEW: CPT

## 2023-12-31 PROCEDURE — 70450 CT HEAD/BRAIN W/O DYE: CPT

## 2023-12-31 PROCEDURE — 74176 CT ABD & PELVIS W/O CONTRAST: CPT

## 2023-12-31 PROCEDURE — 99223 1ST HOSP IP/OBS HIGH 75: CPT | Performed by: INTERNAL MEDICINE

## 2023-12-31 PROCEDURE — 6360000002 HC RX W HCPCS

## 2023-12-31 PROCEDURE — 96367 TX/PROPH/DG ADDL SEQ IV INF: CPT

## 2023-12-31 RX ORDER — ATORVASTATIN CALCIUM 40 MG/1
40 TABLET, FILM COATED ORAL EVERY EVENING
Status: DISCONTINUED | OUTPATIENT
Start: 2023-12-31 | End: 2024-01-05 | Stop reason: HOSPADM

## 2023-12-31 RX ORDER — MIRTAZAPINE 15 MG/1
15 TABLET, FILM COATED ORAL NIGHTLY
Status: DISCONTINUED | OUTPATIENT
Start: 2023-12-31 | End: 2024-01-05 | Stop reason: HOSPADM

## 2023-12-31 RX ORDER — HEPARIN SODIUM 5000 [USP'U]/ML
5000 INJECTION, SOLUTION INTRAVENOUS; SUBCUTANEOUS EVERY 8 HOURS SCHEDULED
Status: CANCELLED | OUTPATIENT
Start: 2023-12-31

## 2023-12-31 RX ORDER — CLOPIDOGREL BISULFATE 75 MG/1
75 TABLET ORAL DAILY
Status: DISCONTINUED | OUTPATIENT
Start: 2023-12-31 | End: 2024-01-05 | Stop reason: HOSPADM

## 2023-12-31 RX ORDER — HALOPERIDOL 5 MG/ML
5 INJECTION INTRAMUSCULAR ONCE
Status: COMPLETED | OUTPATIENT
Start: 2023-12-31 | End: 2023-12-31

## 2023-12-31 RX ORDER — HYDROXYZINE 50 MG/1
50 TABLET, FILM COATED ORAL DAILY PRN
Status: DISCONTINUED | OUTPATIENT
Start: 2023-12-31 | End: 2024-01-05 | Stop reason: HOSPADM

## 2023-12-31 RX ORDER — SODIUM CHLORIDE 9 MG/ML
INJECTION, SOLUTION INTRAVENOUS PRN
Status: DISCONTINUED | OUTPATIENT
Start: 2023-12-31 | End: 2024-01-05 | Stop reason: HOSPADM

## 2023-12-31 RX ORDER — METOPROLOL SUCCINATE 25 MG/1
25 TABLET, EXTENDED RELEASE ORAL EVERY EVENING
Status: DISCONTINUED | OUTPATIENT
Start: 2023-12-31 | End: 2024-01-05 | Stop reason: HOSPADM

## 2023-12-31 RX ORDER — HEPARIN SODIUM 10000 [USP'U]/100ML
5-30 INJECTION, SOLUTION INTRAVENOUS CONTINUOUS
Status: DISCONTINUED | OUTPATIENT
Start: 2023-12-31 | End: 2023-12-31

## 2023-12-31 RX ORDER — 0.9 % SODIUM CHLORIDE 0.9 %
30 INTRAVENOUS SOLUTION INTRAVENOUS ONCE
Status: DISCONTINUED | OUTPATIENT
Start: 2023-12-31 | End: 2023-12-31

## 2023-12-31 RX ORDER — ENOXAPARIN SODIUM 100 MG/ML
1 INJECTION SUBCUTANEOUS 2 TIMES DAILY
Status: CANCELLED | OUTPATIENT
Start: 2023-12-31

## 2023-12-31 RX ORDER — HEPARIN SODIUM 10000 [USP'U]/100ML
5-30 INJECTION, SOLUTION INTRAVENOUS CONTINUOUS
Status: DISCONTINUED | OUTPATIENT
Start: 2023-12-31 | End: 2024-01-02

## 2023-12-31 RX ORDER — ACETAMINOPHEN 650 MG/1
650 SUPPOSITORY RECTAL EVERY 6 HOURS PRN
Status: DISCONTINUED | OUTPATIENT
Start: 2023-12-31 | End: 2024-01-05 | Stop reason: HOSPADM

## 2023-12-31 RX ORDER — ONDANSETRON 4 MG/1
4 TABLET, ORALLY DISINTEGRATING ORAL EVERY 8 HOURS PRN
Status: DISCONTINUED | OUTPATIENT
Start: 2023-12-31 | End: 2024-01-05 | Stop reason: HOSPADM

## 2023-12-31 RX ORDER — LINEZOLID 2 MG/ML
600 INJECTION, SOLUTION INTRAVENOUS EVERY 12 HOURS
Status: DISCONTINUED | OUTPATIENT
Start: 2023-12-31 | End: 2024-01-02

## 2023-12-31 RX ORDER — HEPARIN SODIUM 1000 [USP'U]/ML
60 INJECTION, SOLUTION INTRAVENOUS; SUBCUTANEOUS ONCE
Status: COMPLETED | OUTPATIENT
Start: 2023-12-31 | End: 2023-12-31

## 2023-12-31 RX ORDER — ALBUTEROL SULFATE 2.5 MG/3ML
2.5 SOLUTION RESPIRATORY (INHALATION) EVERY 6 HOURS PRN
Status: DISCONTINUED | OUTPATIENT
Start: 2023-12-31 | End: 2024-01-05 | Stop reason: HOSPADM

## 2023-12-31 RX ORDER — SODIUM CHLORIDE 0.9 % (FLUSH) 0.9 %
5-40 SYRINGE (ML) INJECTION EVERY 12 HOURS SCHEDULED
Status: DISCONTINUED | OUTPATIENT
Start: 2023-12-31 | End: 2024-01-05 | Stop reason: HOSPADM

## 2023-12-31 RX ORDER — LEVOTHYROXINE SODIUM 0.03 MG/1
25 TABLET ORAL
Status: DISCONTINUED | OUTPATIENT
Start: 2024-01-01 | End: 2024-01-05 | Stop reason: HOSPADM

## 2023-12-31 RX ORDER — LAMOTRIGINE 25 MG/1
25 TABLET ORAL DAILY
Status: DISCONTINUED | OUTPATIENT
Start: 2023-12-31 | End: 2024-01-02

## 2023-12-31 RX ORDER — SODIUM CHLORIDE 0.9 % (FLUSH) 0.9 %
5-40 SYRINGE (ML) INJECTION PRN
Status: DISCONTINUED | OUTPATIENT
Start: 2023-12-31 | End: 2024-01-05 | Stop reason: HOSPADM

## 2023-12-31 RX ORDER — ONDANSETRON 2 MG/ML
4 INJECTION INTRAMUSCULAR; INTRAVENOUS EVERY 6 HOURS PRN
Status: DISCONTINUED | OUTPATIENT
Start: 2023-12-31 | End: 2024-01-05 | Stop reason: HOSPADM

## 2023-12-31 RX ORDER — IPRATROPIUM BROMIDE AND ALBUTEROL SULFATE 2.5; .5 MG/3ML; MG/3ML
1 SOLUTION RESPIRATORY (INHALATION)
Status: DISCONTINUED | OUTPATIENT
Start: 2023-12-31 | End: 2024-01-05 | Stop reason: HOSPADM

## 2023-12-31 RX ORDER — AMIODARONE HYDROCHLORIDE 200 MG/1
100 TABLET ORAL EVERY MORNING
Status: DISCONTINUED | OUTPATIENT
Start: 2024-01-01 | End: 2024-01-05 | Stop reason: HOSPADM

## 2023-12-31 RX ORDER — ACETAMINOPHEN 325 MG/1
650 TABLET ORAL EVERY 6 HOURS PRN
Status: DISCONTINUED | OUTPATIENT
Start: 2023-12-31 | End: 2024-01-05 | Stop reason: HOSPADM

## 2023-12-31 RX ORDER — FUROSEMIDE 40 MG/1
80 TABLET ORAL EVERY OTHER DAY
Status: DISCONTINUED | OUTPATIENT
Start: 2023-12-31 | End: 2024-01-05 | Stop reason: HOSPADM

## 2023-12-31 RX ORDER — FAMOTIDINE 20 MG/1
20 TABLET, FILM COATED ORAL
Status: DISCONTINUED | OUTPATIENT
Start: 2024-01-01 | End: 2024-01-01

## 2023-12-31 RX ORDER — HEPARIN SODIUM 1000 [USP'U]/ML
30 INJECTION, SOLUTION INTRAVENOUS; SUBCUTANEOUS PRN
Status: DISCONTINUED | OUTPATIENT
Start: 2023-12-31 | End: 2024-01-02

## 2023-12-31 RX ORDER — POLYETHYLENE GLYCOL 3350 17 G/17G
17 POWDER, FOR SOLUTION ORAL DAILY PRN
Status: DISCONTINUED | OUTPATIENT
Start: 2023-12-31 | End: 2024-01-05 | Stop reason: HOSPADM

## 2023-12-31 RX ORDER — HEPARIN SODIUM 1000 [USP'U]/ML
60 INJECTION, SOLUTION INTRAVENOUS; SUBCUTANEOUS PRN
Status: DISCONTINUED | OUTPATIENT
Start: 2023-12-31 | End: 2024-01-02

## 2023-12-31 RX ORDER — ASPIRIN 81 MG/1
81 TABLET ORAL DAILY
Status: DISCONTINUED | OUTPATIENT
Start: 2023-12-31 | End: 2024-01-05 | Stop reason: HOSPADM

## 2023-12-31 RX ADMIN — CLOPIDOGREL BISULFATE 75 MG: 75 TABLET ORAL at 16:00

## 2023-12-31 RX ADMIN — METOPROLOL SUCCINATE 25 MG: 25 TABLET, EXTENDED RELEASE ORAL at 18:26

## 2023-12-31 RX ADMIN — LAMOTRIGINE 25 MG: 25 TABLET ORAL at 16:00

## 2023-12-31 RX ADMIN — PIPERACILLIN AND TAZOBACTAM 3375 MG: 3; .375 INJECTION, POWDER, LYOPHILIZED, FOR SOLUTION INTRAVENOUS at 18:14

## 2023-12-31 RX ADMIN — MIRTAZAPINE 15 MG: 15 TABLET, FILM COATED ORAL at 20:21

## 2023-12-31 RX ADMIN — ATORVASTATIN CALCIUM 40 MG: 40 TABLET, FILM COATED ORAL at 18:26

## 2023-12-31 RX ADMIN — IPRATROPIUM BROMIDE AND ALBUTEROL SULFATE 1 DOSE: 2.5; .5 SOLUTION RESPIRATORY (INHALATION) at 15:51

## 2023-12-31 RX ADMIN — VANCOMYCIN HYDROCHLORIDE 1500 MG: 1.5 INJECTION, POWDER, LYOPHILIZED, FOR SOLUTION INTRAVENOUS at 11:11

## 2023-12-31 RX ADMIN — HYDROXYZINE HYDROCHLORIDE 50 MG: 50 TABLET, FILM COATED ORAL at 20:21

## 2023-12-31 RX ADMIN — ACETAMINOPHEN 650 MG: 325 TABLET ORAL at 20:24

## 2023-12-31 RX ADMIN — HEPARIN SODIUM 3540 UNITS: 1000 INJECTION INTRAVENOUS; SUBCUTANEOUS at 16:35

## 2023-12-31 RX ADMIN — HEPARIN SODIUM 12 UNITS/KG/HR: 10000 INJECTION, SOLUTION INTRAVENOUS at 16:17

## 2023-12-31 RX ADMIN — PIPERACILLIN AND TAZOBACTAM 4500 MG: 4; .5 INJECTION, POWDER, FOR SOLUTION INTRAVENOUS at 10:36

## 2023-12-31 RX ADMIN — ASPIRIN 81 MG: 81 TABLET, COATED ORAL at 16:00

## 2023-12-31 RX ADMIN — LINEZOLID 600 MG: 2 INJECTION, SOLUTION INTRAVENOUS at 16:51

## 2023-12-31 RX ADMIN — METHYLPREDNISOLONE SODIUM SUCCINATE 40 MG: 40 INJECTION, POWDER, LYOPHILIZED, FOR SOLUTION INTRAMUSCULAR; INTRAVENOUS at 16:00

## 2023-12-31 RX ADMIN — HALOPERIDOL LACTATE 5 MG: 5 INJECTION, SOLUTION INTRAMUSCULAR at 23:13

## 2023-12-31 RX ADMIN — FUROSEMIDE 80 MG: 40 TABLET ORAL at 16:13

## 2023-12-31 ASSESSMENT — PAIN SCALES - GENERAL: PAINLEVEL_OUTOF10: 3

## 2023-12-31 NOTE — ED PROVIDER NOTES
EMERGENCY DEPARTMENT ENCOUNTER    Pt Name: Lupe Ojeda  MRN: [id number]  Birthdate 1950  Date of evaluation: 12/31/23  CHIEF COMPLAINT       Chief Complaint   Patient presents with    Altered Mental Status    Hematuria    Abdominal Pain     HISTORY OF PRESENT ILLNESS   75-year-old female presents for reports of altered mental status and reported hematuria.  Family reports that patient is a dialysis patient but does make a small amount of urine reportedly had small amount of hematuria over the last couple days and was more altered than baseline.  Family reports that she does call out to family members but she has been more confused.  Patient complaining of abdominal pain    The history is provided by a relative.           REVIEW OF SYSTEMS     Review of Systems   Unable to perform ROS: Mental status change     PASTMEDICAL HISTORY     Past Medical History:   Diagnosis Date    Acute CVA (cerebrovascular accident) ([facility]) 07/25/2020    Acute kidney injury superimposed on CKD ([facility]) 06/08/2022    Acute on chronic combined systolic (congestive) and diastolic (congestive) heart failure ([facility]) 02/06/2022    Acute respiratory failure with hypoxia and hypercapnia ([facility])     JOY (acute kidney injury) ([facility]) 01/15/2022    Arthritis     Asthma     Atrial fibrillation, unspecified type ([facility])     Bilateral lower extremity edema 04/20/2022    Bilateral pleural effusion 11/20/2023    Bradycardia 06/12/2022    Chronic diastolic congestive heart failure ([facility]) 02/20/2020    Chronic ulcer of left leg with fat layer exposed ([facility]) 06/21/2022    CKD stage 4 secondary to hypertension ([facility]) 02/20/2020    Dependence on renal dialysis ([facility])     Tues, Th, Sat    ESRD (end stage renal disease) ([facility])     Essential hypertension 07/25/2020    Gastrointestinal hemorrhage 07/20/2022    Gout     Hallucinations 02/18/2021    Hard of hearing     Head contusion 09/09/2020    Hyperkalemia 06/28/2022    Iron deficiency anemia, unspecified     Leg ulcer,

## 2023-12-31 NOTE — H&P
[facility]  IN-PATIENT SERVICE  [facility]  IN-PATIENT SERVICE   [facility]     HISTORY AND PHYSICAL EXAMINATION            Date:   12/31/2023  Patient name:  Lupe Ojeda  Date of admission:  12/31/2023  9:11 AM  MRN:   [id number]  Account:  [id number]  YOB: 1950  PCP:    Kayleigh Cardoso MD  Room:   11/11  Code Status:    Prior    Chief Complaint:     Chief Complaint   Patient presents with    Altered Mental Status    Hematuria    Abdominal Pain       History Obtained From:     Daughter    History of Present Illness:     The patient is a 73 y.o.  Non- / non  female who presents withAltered Mental Status, Hematuria, and Abdominal Pain   and she is admitted to the hospital for the management of  UTI.  73 year old female with COPD, HTN, bilateral cataract, ESRD on dialysis TTS, HFrEF, CVA in 2020, CAD s/p stent 11/23, venous insufficiency bilateral LE, and Afib presented to the ED with family due to altered mental status and hematuria.    Family reports that patient is a dialysis patient but does make a small amount of urine reportedly had small amount of hematuria over the last couple days and was more altered than baseline.  Family reports that she does call out to family members but she has been more confused.     Patient is poor historian due to dementia.  Patient is admitted for treatment of UTI, pneumonia, and Afib with RVR.    Past Medical History:     Past Medical History:   Diagnosis Date    Acute CVA (cerebrovascular accident) ([facility]) 07/25/2020    Acute kidney injury superimposed on CKD ([facility]) 06/08/2022    Acute on chronic combined systolic (congestive) and diastolic (congestive) heart failure ([facility]) 02/06/2022    Acute respiratory failure with hypoxia and hypercapnia ([facility])     JOY (acute kidney injury) ([facility]) 01/15/2022    Arthritis     Asthma     Atrial fibrillation,  Kayleigh Cardoso MD   lidocaine (LIDODERM) 5 % Place 1 patch onto the skin daily 12 hours on, 12 hours off. 10/18/23   Kayleigh Cardoso MD   lidocaine (LIDOCAINE PAIN RELIEF) 4 % external patch 1 patch as needed Externally Once a day    Provider, MD Mino   Wound Dressings (ZENIFOAM GENTLE 9\"X9\"/SACRAL) PADS Apply 1 each topically daily 10/16/23   Santo Pratt APRN - CNP   nystatin (MYCOSTATIN) 951862 UNIT/GM powder Apply 3 times daily. 10/16/23   Santo Pratt APRN - CNP   Lift Chair MISC by Does not apply route 8/23/23   Kayleigh Cardoso MD   metoprolol succinate (TOPROL XL) 25 MG extended release tablet Take 1 tablet by mouth every evening Hold if BP bellow 115/65. Stop metoprolol tartrate 8/10/23   Kayleigh Cardoso MD   hydrOXYzine HCl (ATARAX) 50 MG tablet Take 1 tablet by mouth daily as needed for Anxiety 8/10/23   Kayleigh Cardoso MD   albuterol (PROVENTIL) (2.5 MG/3ML) 0.083% nebulizer solution Take 3 mLs by nebulization every 6 hours as needed for Wheezing or Shortness of Breath 8/9/23   Kayleigh Cardoso MD   polyethylene glycol (MIRALAX) 17 GM/SCOOP powder Take 17 g by mouth daily 8/2/23   Kayleigh Cardoso MD   lidocaine (LMX) 4 % cream 5 g topical 2-3 times a day as needed for pain, maximum 20 g/day 7/26/23   Kayleigh Cardoso MD   albuterol sulfate HFA (PROAIR HFA) 108 (90 Base) MCG/ACT inhaler Inhale 2 puffs into the lungs every 6 hours as needed for Wheezing or Shortness of Breath (cough) 7/19/23   Kayleigh Cardoso MD   levothyroxine (SYNTHROID) 25 MCG tablet Take 1 tablet by mouth every morning (before breakfast) without no other meds for 30 minutes, take when you first wake up and you are still bed 7/13/23   Kayleigh Carodso MD   midodrine (PROAMATINE) 5 MG tablet Take 1 tablet by mouth 2 times daily as needed (if low BP below 115/65, take to dialysis) 7/11/23   Kayleigh Cardoso MD   acetaminophen (TYLENOL) 500 MG tablet Take 1 tablet by mouth every 6  110/71   Pulse (!) 101   Temp 98.4 °F (36.9 °C) (Oral)   Resp 18   Ht 1.499 m (4' 11\")   Wt 57.6 kg (127 lb)   SpO2 99%   BMI 25.65 kg/m²   Temp (24hrs), Av.4 °F (36.9 °C), Min:98.4 °F (36.9 °C), Max:98.4 °F (36.9 °C)    No results for input(s): \"POCGLU\" in the last 72 hours.    Intake/Output Summary (Last 24 hours) at 2023 1446  Last data filed at 2023 1110  Gross per 24 hour   Intake 100 ml   Output --   Net 100 ml       Physical Exam  Constitutional:       Appearance: Normal appearance.   HENT:      Right Ear: Decreased hearing noted.      Left Ear: Decreased hearing noted.   Cardiovascular:      Rate and Rhythm: Regular rhythm. Tachycardia present.      Pulses: Normal pulses.      Heart sounds: Normal heart sounds. No murmur heard.  Pulmonary:      Effort: Pulmonary effort is normal. No respiratory distress.      Breath sounds: Normal breath sounds. No wheezing or rales.   Abdominal:      General: Abdomen is flat.      Palpations: Abdomen is soft.      Tenderness: There is no abdominal tenderness. There is no guarding.   Musculoskeletal:         General: Tenderness present.      Right lower leg: No edema.      Left lower leg: No edema.      Comments: redness bilateral lower extremity   Neurological:      Mental Status: She is alert. She is disoriented.         Investigations:     Laboratory Testing:  Recent Results (from the past 24 hour(s))   Blood Culture 1    Collection Time: 23  9:40 AM    Specimen: Blood   Result Value Ref Range    Specimen Description .BLOOD LT FA     Special Requests          Culture NO GROWTH <24 HRS    Culture, Blood 2    Collection Time: 23  9:50 AM    Specimen: Blood   Result Value Ref Range    Specimen Description .BLOOD LT AC     Special Requests          Culture NO GROWTH <24 HRS    CBC with Auto Differential    Collection Time: 23 10:04 AM   Result Value Ref Range    WBC 17.8 (H) 3.5 - 11.0 k/uL    RBC 4.37 4.0 - 5.2 m/uL    Hemoglobin 13.1

## 2023-12-31 NOTE — PROGRESS NOTES
Pharmacy monitoring of Heparin infusion  Heparin Infusion New Order    Patient on heparin for:  atrial fibrillation    Was patient on previous oral anticoagulant/dose?:  No , Confirmed with RN/Pt/Pts family/Surescripts?: Yes    Factor Xa inhibitor/LMWH use within the past 72 hours? NO  If yes, date of last administration: n/a    Recent Labs     12/31/23  1004   HGB 13.1   INR 1.0          Heparin to be monitored using anti-Xa for duration of therapy.(aPTT should be used for patients who have received a DOAC in the past 72 hours)?      Have admin instructions been updated to reflect appropriate protocol? YES    Have appropriate labs been ordered for corresponding protocol? YES   *Discontinue anti-Xa lab monitoring if using aPTT protocol    Is the starting rate/last rate adjustment appropriate? Yes    Concurrent anticoagulants: n/a  (Note it is not appropriate to be on most anticoagulants while on a heparin drip)    Review platelets from the past few days.  Any concerns?: No    Please record any appropriate additional notes here:  None at this time

## 2024-01-01 ENCOUNTER — CLINICAL DOCUMENTATION ONLY (OUTPATIENT)
Facility: CLINIC | Age: 74
End: 2024-01-01

## 2024-01-01 PROBLEM — I15.0 RENOVASCULAR HYPERTENSION: Status: ACTIVE | Noted: 2024-01-01

## 2024-01-01 LAB
ABSOLUTE BANDS #: 0.44 K/UL (ref 0–1)
ANION GAP SERPL CALCULATED.3IONS-SCNC: 13 MMOL/L (ref 9–17)
ANTI-XA UNFRAC HEPARIN: <0.1 IU/L (ref 0.3–0.7)
B PARAP IS1001 DNA NPH QL NAA+NON-PROBE: NOT DETECTED
B PERT DNA SPEC QL NAA+PROBE: NOT DETECTED
BANDS: 4 % (ref 0–10)
BASOPHILS # BLD: 0 K/UL (ref 0–0.2)
BASOPHILS NFR BLD: 0 % (ref 0–2)
BUN SERPL-MCNC: 30 MG/DL (ref 8–23)
C PNEUM DNA NPH QL NAA+NON-PROBE: NOT DETECTED
CALCIUM SERPL-MCNC: 8.5 MG/DL (ref 8.6–10.4)
CHLORIDE SERPL-SCNC: 96 MMOL/L (ref 98–107)
CO2 SERPL-SCNC: 29 MMOL/L (ref 20–31)
CREAT SERPL-MCNC: 3.9 MG/DL (ref 0.5–0.9)
EOSINOPHIL # BLD: 0 K/UL (ref 0–0.4)
EOSINOPHILS RELATIVE PERCENT: 0 % (ref 0–4)
ERYTHROCYTE [DISTWIDTH] IN BLOOD BY AUTOMATED COUNT: 18.1 % (ref 11.5–14.9)
FLUAV RNA NPH QL NAA+NON-PROBE: NOT DETECTED
FLUBV RNA NPH QL NAA+NON-PROBE: NOT DETECTED
GFR SERPL CREATININE-BSD FRML MDRD: 12 ML/MIN/1.73M2
GLUCOSE BLD-MCNC: 165 MG/DL (ref 65–105)
GLUCOSE SERPL-MCNC: 158 MG/DL (ref 70–99)
HADV DNA NPH QL NAA+NON-PROBE: NOT DETECTED
HCOV 229E RNA NPH QL NAA+NON-PROBE: NOT DETECTED
HCOV HKU1 RNA NPH QL NAA+NON-PROBE: NOT DETECTED
HCOV NL63 RNA NPH QL NAA+NON-PROBE: NOT DETECTED
HCOV OC43 RNA NPH QL NAA+NON-PROBE: NOT DETECTED
HCT VFR BLD AUTO: 35.2 % (ref 36–46)
HGB BLD-MCNC: 11.2 G/DL (ref 12–16)
HMPV RNA NPH QL NAA+NON-PROBE: NOT DETECTED
HPIV1 RNA NPH QL NAA+NON-PROBE: NOT DETECTED
HPIV2 RNA NPH QL NAA+NON-PROBE: NOT DETECTED
HPIV3 RNA NPH QL NAA+NON-PROBE: NOT DETECTED
HPIV4 RNA NPH QL NAA+NON-PROBE: NOT DETECTED
LYMPHOCYTES NFR BLD: 0.56 K/UL (ref 1–4.8)
LYMPHOCYTES RELATIVE PERCENT: 5 % (ref 24–44)
M PNEUMO DNA NPH QL NAA+NON-PROBE: NOT DETECTED
M PNEUMO IGM SER QL IA: 0.21
MCH RBC QN AUTO: 29.9 PG (ref 26–34)
MCHC RBC AUTO-ENTMCNC: 31.9 G/DL (ref 31–37)
MCV RBC AUTO: 93.8 FL (ref 80–100)
MICROORGANISM SPEC CULT: NORMAL
MONOCYTES NFR BLD: 0.44 K/UL (ref 0.1–1.3)
MONOCYTES NFR BLD: 4 % (ref 1–7)
MORPHOLOGY: ABNORMAL
MRSA, DNA, NASAL: NEGATIVE
NEUTROPHILS NFR BLD: 87 % (ref 36–66)
NEUTS SEG NFR BLD: 9.66 K/UL (ref 1.3–9.1)
PLATELET # BLD AUTO: 126 K/UL (ref 150–450)
PMV BLD AUTO: 8.2 FL (ref 6–12)
POTASSIUM SERPL-SCNC: 4.7 MMOL/L (ref 3.7–5.3)
RBC # BLD AUTO: 3.76 M/UL (ref 4–5.2)
RSV RNA NPH QL NAA+NON-PROBE: NOT DETECTED
RV+EV RNA NPH QL NAA+NON-PROBE: NOT DETECTED
SARS-COV-2 RNA NPH QL NAA+NON-PROBE: NOT DETECTED
SODIUM SERPL-SCNC: 138 MMOL/L (ref 135–144)
SPECIMEN DESCRIPTION: NORMAL
TROPONIN I SERPL HS-MCNC: 87 NG/L (ref 0–14)
WBC OTHER # BLD: 11.1 K/UL (ref 3.5–11)

## 2024-01-01 PROCEDURE — 85520 HEPARIN ASSAY: CPT

## 2024-01-01 PROCEDURE — 94640 AIRWAY INHALATION TREATMENT: CPT

## 2024-01-01 PROCEDURE — 2060000000 HC ICU INTERMEDIATE R&B

## 2024-01-01 PROCEDURE — 85025 COMPLETE CBC W/AUTO DIFF WBC: CPT

## 2024-01-01 PROCEDURE — 94761 N-INVAS EAR/PLS OXIMETRY MLT: CPT

## 2024-01-01 PROCEDURE — 51798 US URINE CAPACITY MEASURE: CPT

## 2024-01-01 PROCEDURE — 2580000003 HC RX 258

## 2024-01-01 PROCEDURE — 97535 SELF CARE MNGMENT TRAINING: CPT

## 2024-01-01 PROCEDURE — 2700000000 HC OXYGEN THERAPY PER DAY

## 2024-01-01 PROCEDURE — 80048 BASIC METABOLIC PNL TOTAL CA: CPT

## 2024-01-01 PROCEDURE — 97162 PT EVAL MOD COMPLEX 30 MIN: CPT

## 2024-01-01 PROCEDURE — 99233 SBSQ HOSP IP/OBS HIGH 50: CPT | Performed by: INTERNAL MEDICINE

## 2024-01-01 PROCEDURE — 99223 1ST HOSP IP/OBS HIGH 75: CPT | Performed by: INTERNAL MEDICINE

## 2024-01-01 PROCEDURE — 6370000000 HC RX 637 (ALT 250 FOR IP)

## 2024-01-01 PROCEDURE — 36415 COLL VENOUS BLD VENIPUNCTURE: CPT

## 2024-01-01 PROCEDURE — 97166 OT EVAL MOD COMPLEX 45 MIN: CPT

## 2024-01-01 PROCEDURE — 84484 ASSAY OF TROPONIN QUANT: CPT

## 2024-01-01 PROCEDURE — 97530 THERAPEUTIC ACTIVITIES: CPT

## 2024-01-01 PROCEDURE — 2580000003 HC RX 258: Performed by: NURSE PRACTITIONER

## 2024-01-01 PROCEDURE — 6360000002 HC RX W HCPCS: Performed by: NURSE PRACTITIONER

## 2024-01-01 PROCEDURE — 6360000002 HC RX W HCPCS

## 2024-01-01 PROCEDURE — 82947 ASSAY GLUCOSE BLOOD QUANT: CPT

## 2024-01-01 RX ORDER — FAMOTIDINE 20 MG/1
10 TABLET, FILM COATED ORAL
Status: DISCONTINUED | OUTPATIENT
Start: 2024-01-02 | End: 2024-01-05 | Stop reason: HOSPADM

## 2024-01-01 RX ADMIN — AMIODARONE HYDROCHLORIDE 100 MG: 200 TABLET ORAL at 10:56

## 2024-01-01 RX ADMIN — LINEZOLID 600 MG: 2 INJECTION, SOLUTION INTRAVENOUS at 04:05

## 2024-01-01 RX ADMIN — WATER 7.5 MG: 1 INJECTION INTRAMUSCULAR; INTRAVENOUS; SUBCUTANEOUS at 19:33

## 2024-01-01 RX ADMIN — IPRATROPIUM BROMIDE AND ALBUTEROL SULFATE 1 DOSE: 2.5; .5 SOLUTION RESPIRATORY (INHALATION) at 20:26

## 2024-01-01 RX ADMIN — PIPERACILLIN AND TAZOBACTAM 3375 MG: 3; .375 INJECTION, POWDER, LYOPHILIZED, FOR SOLUTION INTRAVENOUS at 05:45

## 2024-01-01 RX ADMIN — METHYLPREDNISOLONE SODIUM SUCCINATE 40 MG: 40 INJECTION, POWDER, LYOPHILIZED, FOR SOLUTION INTRAMUSCULAR; INTRAVENOUS at 16:14

## 2024-01-01 RX ADMIN — IPRATROPIUM BROMIDE AND ALBUTEROL SULFATE 1 DOSE: 2.5; .5 SOLUTION RESPIRATORY (INHALATION) at 07:22

## 2024-01-01 RX ADMIN — FAMOTIDINE 20 MG: 20 TABLET, FILM COATED ORAL at 05:40

## 2024-01-01 RX ADMIN — ATORVASTATIN CALCIUM 40 MG: 40 TABLET, FILM COATED ORAL at 18:04

## 2024-01-01 RX ADMIN — MIRTAZAPINE 15 MG: 15 TABLET, FILM COATED ORAL at 19:53

## 2024-01-01 RX ADMIN — HYDROXYZINE HYDROCHLORIDE 50 MG: 50 TABLET, FILM COATED ORAL at 19:53

## 2024-01-01 RX ADMIN — METHYLPREDNISOLONE SODIUM SUCCINATE 40 MG: 40 INJECTION, POWDER, LYOPHILIZED, FOR SOLUTION INTRAMUSCULAR; INTRAVENOUS at 04:10

## 2024-01-01 RX ADMIN — LEVOTHYROXINE SODIUM 25 MCG: 0.03 TABLET ORAL at 05:40

## 2024-01-01 RX ADMIN — LAMOTRIGINE 25 MG: 25 TABLET ORAL at 11:04

## 2024-01-01 RX ADMIN — IPRATROPIUM BROMIDE AND ALBUTEROL SULFATE 1 DOSE: 2.5; .5 SOLUTION RESPIRATORY (INHALATION) at 16:21

## 2024-01-01 RX ADMIN — PIPERACILLIN AND TAZOBACTAM 3375 MG: 3; .375 INJECTION, POWDER, LYOPHILIZED, FOR SOLUTION INTRAVENOUS at 18:18

## 2024-01-01 RX ADMIN — CLOPIDOGREL BISULFATE 75 MG: 75 TABLET ORAL at 10:57

## 2024-01-01 RX ADMIN — ASPIRIN 81 MG: 81 TABLET, COATED ORAL at 10:57

## 2024-01-01 RX ADMIN — METOPROLOL SUCCINATE 25 MG: 25 TABLET, EXTENDED RELEASE ORAL at 18:04

## 2024-01-01 RX ADMIN — ACETAMINOPHEN 650 MG: 325 TABLET ORAL at 19:53

## 2024-01-01 RX ADMIN — LINEZOLID 600 MG: 2 INJECTION, SOLUTION INTRAVENOUS at 18:03

## 2024-01-01 ASSESSMENT — ENCOUNTER SYMPTOMS
CHEST TIGHTNESS: 0
COUGH: 0
SHORTNESS OF BREATH: 0
ABDOMINAL PAIN: 0

## 2024-01-01 ASSESSMENT — PAIN SCALES - GENERAL: PAINLEVEL_OUTOF10: 3

## 2024-01-01 NOTE — PROGRESS NOTES
Pt responded to consult for spiritual care. Pt was sleeping with daughter Kiya at bedside. Kiya updated writer and said her brother had been with pt throughout the night. Kiya said her mom always asks for prayer so appreciated writer's visit and prayer.    01/01/24 1209   Encounter Summary   Encounter Overview/Reason  Spiritual/Emotional Needs   Service Provided For: Patient and family together   Referral/Consult From: Nurse   Support System Children   Last Encounter  01/01/24   Complexity of Encounter Moderate   Spiritual/Emotional needs   Type Spiritual Support   Assessment/Intervention/Outcome   Assessment Calm   Intervention Active listening;Discussed illness injury and it’s impact;Explored/Affirmed feelings, thoughts, concerns;Prayer (assurance of)/[place];Sustaining Presence/Ministry of presence   Outcome Comfort;Engaged in conversation;Expressed feelings, needs, and concerns;Expressed Gratitude;Receptive

## 2024-01-01 NOTE — CONSULTS
[facility]   Consult Note                 Date:   1/1/2024  Date of admission:  12/31/2023  9:11 AM  MRN:   [id number]  YOB: 1950    Reason for Consult: Elevated tropes    HISTORY OF PRESENT ILLNESS:    The patient is a 73 y.o.  female who is admitted to the hospital for for hematuria found to be in UTI and being treated  Also mention altered mental status  Patient's son is in the room to take care of her according to him patient hearing and NVM problem due to cataract since that patient is not much active  Patient has extensive history of CAD CABG history of recent heart cath as below on 11/13/2023 status post stent to the left circumflex.  Patient denies any chest pain pressure tightness any lightheaded dizzy  Denies any ankle swelling      Known with :      1. CABG with LIMA-LAD, SVG-OM and SVG-RCA in 2003.  2. Occluded SVG-OM2 by heart catheterization in 2006.  3. LV dysfunction, mild severity.  4. Hyperlipidemia.  5. Diabetes.  6. Stress test in April 2011 suggested old lateral infarction, small area, with preserved LV  function 64% and no ischemia.  7. 7/28/20 [facility] w/ Celllulitis/sepsis. Afib RVR.  8. 7/31/20- KARLA/CV 200J x1. Eliquis, LVEF 55%, moderate atheromatous disease in the arch, moderate AI, mild MR mild TR9. 03/14/2022 limited study echo LVEF 60-65% mildly dilated LA  10. Admitted in [place] on 03/01/2022, hemoglobin was 4.1 according to the daughter status post blood transfusion  11. ECHO 2/7/2022 Normal LV size. LVEF > 55%. No WMA. Moderate LVH. Normal RV size and function. LA dilatation. AV trileaflet. Mild AS. Mild-moderate AI. Normal Aortic root dimension. MAC Mild MR Mild TR. Normal RVSP. No PCE. Pleural effusion present.     12. End-stage renal disease on dialysis    Past Medical History:   has a past medical history of Acute CVA (cerebrovascular accident) (HCC), Acute kidney injury superimposed on CKD (HCC), Acute on chronic combined systolic  ointment Apply topically 3 times daily x 10 days. 11/17/23   Kayleigh Cardoso MD   famotidine (PEPCID) 20 MG tablet Take 1 tablet by mouth every morning (before breakfast) Replacing omeprazole due to end-stage kidney disease 10/27/23   Kayleigh Cardoso MD   lamoTRIgine (LAMICTAL) 25 MG tablet Take 1 tablet by mouth 2 times daily  Patient taking differently: Take 1 tablet by mouth daily 10/25/23   Alyssa Darnell MD   atorvastatin (LIPITOR) 40 MG tablet Take 1 tablet by mouth every evening Take 1 tablet by mouth once daily 10/23/23   Kayleigh Cardoso MD   melatonin 5 MG TABS tablet Take 1 tablet by mouth every evening For insomnia 10/23/23   Kayleigh Cardoso MD   fluticasone-salmeterol (ADVAIR HFA) 115-21 MCG/ACT inhaler Inhale 2 puffs into the lungs 2 times daily Maintenance inhaler 10/23/23   Kayleigh Cardoso MD   tiotropium (SPIRIVA RESPIMAT) 2.5 MCG/ACT AERS inhaler Inhale 2 puffs into the lungs every morning (before breakfast) 10/23/23   Kayleigh Cardoso MD   lidocaine (LIDODERM) 5 % Place 1 patch onto the skin daily 12 hours on, 12 hours off. 10/18/23   Kayleigh Cardoso MD   lidocaine (LIDOCAINE PAIN RELIEF) 4 % external patch 1 patch as needed Externally Once a day    Provider, MD Mino   Wound Dressings (ZENIFOAM GENTLE 9\"X9\"/SACRAL) PADS Apply 1 each topically daily 10/16/23   Santo Pratt APRN - CNP   nystatin (MYCOSTATIN) 843281 UNIT/GM powder Apply 3 times daily. 10/16/23   Santo Pratt APRN - CNP   Lift Chair MISC by Does not apply route 8/23/23   Kayleigh Cardoso MD   metoprolol succinate (TOPROL XL) 25 MG extended release tablet Take 1 tablet by mouth every evening Hold if BP bellow 115/65. Stop metoprolol tartrate 8/10/23   Kayleigh Cardoso MD   hydrOXYzine HCl (ATARAX) 50 MG tablet Take 1 tablet by mouth daily as needed for Anxiety 8/10/23   Kayleigh Cardoso MD   albuterol (PROVENTIL) (2.5 MG/3ML) 0.083% nebulizer solution Take 3 mLs by nebulization

## 2024-01-01 NOTE — PROGRESS NOTES
[facility]  IN-PATIENT SERVICE  [facility]    PROGRESS NOTE             1/1/2024    9:48 AM    Name:   Lupe Ojeda  MRN:     [id number]     Acct:      [id number]   Room:   2125/2125-01   Day:  1  Admit Date:  12/31/2023  9:11 AM    PCP:  Kayleigh Cardoso MD  Code Status:  Full Code    SUBJECTIVE     Status: not changed.    Interval History:    Patient seen and examined at bedside today.  Vital signs stable.  Her son Charles is present.  Patient was sleeping but easily arousable. Patient has history of dementia, continues to have confusion so most of the history was taken from her son.  Patient denies chest pain, cough, abdominal pain, dysuria.  Seen by the cardiologist this morning, will await their recommendations.  Patient has erythema of the right and left lower extremities but patient's son says this is chronic and she sees wound care outpatient.  Wound care has also been consulted for her sacral wound, appreciate recommendations.    Brief History:    73 year old female with COPD, HTN, bilateral cataract, ESRD on dialysis TTS, HFrEF, CVA in 2020, CAD s/p stent 11/23, venous insufficiency bilateral LE, and Afib who presented to the ED due to altered mental status and hematuria. Patient is a poor historian due to dementia.      Family reports that patient is a dialysis patient but does make a small amount of urine reportedly had small amount of hematuria over the last couple days and was more altered than baseline.  Family reports that she does call out to family members but has been more confused.      The patient had chest x-ray which showed diffuse bilateral pulmonary opacities concerning for edema or infection.  Urinalysis showed does have leukocyte esterase, hematuria and WBC's.  Was given Zosyn and Vanco in the ED.  She did also have CT abdomen pelvis which showed gallbladder stones/sludge and non-obstructing right renal calculus.    Patient was  unselect if not a suspected or confirmed emergency medical condition->Emergency Medical Condition (MA) Reason for Exam: Altered Mental Status; Hematuria; Abdominal Pain. pt uncooperative unable to hold still for images. 2 sets of images sent due to patient motion FINDINGS: Exam is moderately degraded by motion. BRAIN/VENTRICLES: There is no acute intracranial hemorrhage, mass effect or midline shift.  No abnormal extra-axial fluid collection.  Similar right temporal encephalomalacia.  There is no evidence of hydrocephalus. ORBITS: The visualized portion of the orbits demonstrate no acute abnormality. SINUSES: The visualized paranasal sinuses and mastoid air cells demonstrate no acute abnormality. SOFT TISSUES/SKULL:  No acute abnormality of the visualized skull or soft tissues.     No acute intracranial abnormality.     ASSESSMENT and PLAN        Primary Problem: Sepsis due to urinary tract infection (HCC)    Active Hospital Problems    Diagnosis Date Noted    Sepsis due to urinary tract infection (HCC) [A41.9, N39.0] 12/31/2023     Patient status Admit as inpatient in the  Progressive Unit/Step down     Altered Mental Status 2/2 UTI vs Pneumonia Sepsis   -Vital signs stable, afebrile  -Continue Zyvox and Zosyn  -WBC count downtrending 11.1 (13.0)  -Procalcitonin 0.3  -Lactate normalized to 1.7  -UA large hemoglobin large leukocyte esterase, microscopic urinalysis many white count. Urine culture pending  -Blood cultures pending  -CT head 12/31: no acute intracranial abnormality  -CXR 12/31: diffuse bilateral pulmonary opacities could represent edema or infection   -Respiratory panel, Legionella, strep pneumo negative    Afib with RVR  -previous history of Afib  -EKG 12/31 showed A-fib with RVR  -Echo 10/25/23 showed EF 40 to 45%, left atrial dilation, tricuspid regurgitation, and mild aortic stenosis and regurgitation  -Continue home dose amiodarone  -Heparin drip held  -Cardio on board, appreciate

## 2024-01-01 NOTE — PLAN OF CARE

## 2024-01-01 NOTE — PROGRESS NOTES
Froilan Bernal MD   Patient:  CASS NELSON   YOB: 1950  MRN:  [id number]  Location: [facility]    2125-01 1/1/24 3:08 PM   [phone] From: Marisa Garcia [facility] RE: CASS NELSON 1950 hematuria PROG CARE  [place]

## 2024-01-01 NOTE — PROGRESS NOTES
BRONCHOSPASM/BRONCHOCONSTRICTION     [x]         IMPROVE AERATION/BREATH SOUNDS  [x]   ADMINISTER BRONCHODILATOR THERAPY AS APPROPRIATE  [x]   ASSESS BREATH SOUNDS  []   IMPLEMENT AEROSOL/MDI PROTOCOL  [x]   PATIENT EDUCATION AS NEEDED

## 2024-01-01 NOTE — PROGRESS NOTES
Physical Therapy  Facility/Department: [facility] PROGRESSIVE CARE  Physical Therapy Initial Assessment    Name: Lupe Ojeda  : 1950  MRN: [id number]  Date of Service: 2024    Discharge Recommendations:  Patient would benefit from continued therapy after discharge, Therapy recommended at discharge          Patient Diagnosis(es): The primary encounter diagnosis was Altered mental status, unspecified altered mental status type. Diagnoses of Acute cystitis with hematuria and Sepsis with encephalopathy without septic shock, due to unspecified organism (HCC) were also pertinent to this visit.  Past Medical History:  has a past medical history of Acute CVA (cerebrovascular accident) ([facility]), Acute kidney injury superimposed on CKD (HCC), Acute on chronic combined systolic (congestive) and diastolic (congestive) heart failure (HCC), Acute respiratory failure with hypoxia and hypercapnia (HCC), JOY (acute kidney injury) ([facility]), Arthritis, Asthma, Atrial fibrillation, unspecified type ([facility]), Bilateral lower extremity edema, Bilateral pleural effusion, Bradycardia, Chronic diastolic congestive heart failure (HCC), Chronic ulcer of left leg with fat layer exposed ([facility]), CKD stage 4 secondary to hypertension ([facility]), Dependence on renal dialysis (HCC), ESRD (end stage renal disease) ([facility]), Essential hypertension, Gastrointestinal hemorrhage, Gout, Hallucinations, Hard of hearing, Head contusion, Hyperkalemia, Iron deficiency anemia, unspecified, Leg ulcer, left, with fat layer exposed (HCC), Lymphedema, Meningioma (HCC), Meningioma, cerebral (HCC), Mild intermittent asthma, uncomplicated, Myocardial infarction ([facility]), Nephrotic range proteinuria, NSTEMI (non-ST elevated myocardial infarction) ([facility]), Obesity (BMI 30-39.9), Old myocardial infarction, Paroxysmal atrial fibrillation (HCC), Pressure injury of buttock, stage 2 (HCC), Pressure injury of sacral region, stage 2 (HCC), Pulmonary edema cardiac cause (HCC), Pure

## 2024-01-01 NOTE — CONSULTS
Urology Consultation    Patient:  Lupe Ojeda  MRN: [id number]  YOB: 1950      CHIEF COMPLAINT: Hematuria    HISTORY OF PRESENT ILLNESS:   The patient is a 73 y.o. female who presents with confusion and hematuria.  Her daughter states that she had been having some blood in her urine, and she became very confused with questioning.  She there was some concern for infection so they brought her to the emergency room.  While in the emergency room she did urinate some blood and clots.  She is a hemodialysis patient has been on dialysis for 1 year.  She does not make a normal amount of urine.  Family denies any fevers chills or complaints of flank pain.    Patient's old records, notes and chart reviewed and summarized above.    Past Medical History:    Past Medical History:   Diagnosis Date    Acute CVA (cerebrovascular accident) ([facility]) 07/25/2020    Acute kidney injury superimposed on CKD ([facility]) 06/08/2022    Acute on chronic combined systolic (congestive) and diastolic (congestive) heart failure ([facility]) 02/06/2022    Acute respiratory failure with hypoxia and hypercapnia ([facility])     JOY (acute kidney injury) ([facility]) 01/15/2022    Arthritis     Asthma     Atrial fibrillation, unspecified type ([facility])     Bilateral lower extremity edema 04/20/2022    Bilateral pleural effusion 11/20/2023    Bradycardia 06/12/2022    Chronic diastolic congestive heart failure ([facility]) 02/20/2020    Chronic ulcer of left leg with fat layer exposed ([facility]) 06/21/2022    CKD stage 4 secondary to hypertension ([facility]) 02/20/2020    Dependence on renal dialysis ([facility])     Tues, Th, Sat    ESRD (end stage renal disease) ([facility])     Essential hypertension 07/25/2020    Gastrointestinal hemorrhage 07/20/2022    Gout     Hallucinations 02/18/2021    Hard of hearing     Head contusion 09/09/2020    Hyperkalemia 06/28/2022    Iron deficiency anemia, unspecified     Leg ulcer, left, with fat layer exposed ([facility]) 04/20/2022    Lymphedema 05/11/2022     nonobstructive.  Otherwise unremarkable      Assessment and Plan   Impression:    Patient Active Problem List   Diagnosis    Vitamin D deficiency    Venous insufficiency of both lower extremities    Type 2 diabetes mellitus with chronic kidney disease on chronic dialysis, without long-term current use of insulin (HCC)    Atherosclerosis of coronary artery bypass graft of native heart without angina pectoris    Hypertension, essential    Iron deficiency anemia    Colonoscopy refused    Bilateral hearing loss    COPD (chronic obstructive pulmonary disease) (HCC)    Gout with tophi    Dyslipidemia    Metabolic encephalopathy    E. coli UTI    Meningioma (HCC)    Kidney stones    Diverticulosis    COVID-19 vaccination declined    Chronic venous stasis dermatitis of both lower extremities    Family history of colon cancer    Family history of pancreatic cancer    Mild malnutrition (HCC)    Decreased strength, endurance, and mobility    Lymphedema    PVD (peripheral vascular disease) (HCC)    Elevated troponin    ESRD on dialysis (HCC)    Hypertensive heart and chronic kidney disease with heart failure and with stage 5 chronic kidney disease, or end stage renal disease (HCC)    Gastroesophageal reflux disease    Moderate anxiety    Other age-related cataract    History of GI bleed    Chronic combined systolic and diastolic CHF (congestive heart failure) (HCC)    Degenerative disease of nervous system, unspecified (HCC)    Allergic rhinitis    Acquired hypothyroidism    Type 2 diabetes mellitus with polyneuropathy (HCC)    Chronic respiratory failure with hypoxia (HCC)    Other osteoporosis without current pathological fracture    Moderate vascular dementia with anxiety (HCC)    Recurrent falls while walking    Persistent atrial fibrillation (HCC)    Slow transit constipation    Right temporal lobe mass    History of type 2 diabetes mellitus    History of chronic atrial fibrillation    Encephalopathy    Bacteremia     Actinomyces infection    ESRD (end stage renal disease) on dialysis (HCC)    Other seizures    Pressure ulcer of coccygeal region, stage III (HCC)    Bilateral pleural effusion    CAD S/P percutaneous coronary angioplasty    H/O heart artery stent    Abnormal echocardiogram    Mixed hyperlipidemia    Sepsis due to urinary tract infection (HCC)    Renovascular hypertension       Plan:     73-year-old female admitted with gross hematuria.    She has a history of chronic kidney disease and has been on dialysis for 1 year.    She is voiding but seeing blood and some small clots.    Urine culture thus far is negative.    CT shows a small nonobstructing stone in the right kidney, but this would not require any intervention at this time.    She would need a cystoscopy as an outpatient to finish the hematuria evaluation.    I would check postvoid residual to make sure she is emptying her bladder.    If the hematuria worsens or she is not emptying I will place a three-way Stanford catheter.    Froilan Bernal MD  4:47 PM 1/1/2024

## 2024-01-01 NOTE — CARE COORDINATION
Case Management Assessment  Initial Evaluation    Date/Time of Evaluation: 1/1/2024 5:16 PM  Assessment Completed by: Debbie Mcallister RN    If patient is discharged prior to next notation, then this note serves as note for discharge by case management.    Patient Name: Lupe Nelson                   YOB: 1950  Diagnosis: Sepsis due to urinary tract infection (HCC) [A41.9, N39.0]                   Date / Time: 12/31/2023  9:11 AM    Patient Admission Status: Inpatient   Readmission Risk (Low < 19, Mod (19-27), High > 27): Readmission Risk Score: 22    Current PCP: Kayleigh Cardoso MD  PCP verified by CM?      Chart Reviewed: Yes      History Provided by: Child/Family  Patient Orientation: Alert and Oriented    Patient Cognition: Alert    Hospitalization in the last 30 days (Readmission):  No    If yes, Readmission Assessment in  Navigator will be completed.    Advance Directives:      Code Status: Full Code   Patient's Primary Decision Maker is: Legal Next of Kin    Primary Decision Maker: [place] - Child - [phone]    Primary Decision Maker: CYRIL NELSON - Child - [phone]    Primary Decision Maker: AZALEA NELSON - Child - [phone]    Primary Decision Maker: Lauren Lemay - Child - [phone]    Discharge Planning:    Patient lives with:   Type of Home: House  Primary Care Giver: Family  Patient Support Systems include: Children, Family Members   Current Financial resources: [place], [place]  Current community resources: Other (Comment) (Deysi KESSLER on T[address]TH[address]S @ [address])  Current services prior to admission: Durable Medical Equipment, Oxygen Therapy, Other (Comment) (Deysi KESSLER @ 1[address] T-TH-S)            Current DME: Walker, Wheelchair, Home Aerosol, Bedside Commode, Oxygen Therapy (Comment), Cane            Type of Home Care services:  None    ADLS  Prior functional level: Assistance with the following:, Bathing, Shopping, Housework, Cooking, Dressing, Mobility  Current  functional level:      PT AM-PAC: 8 /24  OT AM-PAC: 12 /24    Family can provide assistance at DC: Yes  Would you like Case Management to discuss the discharge plan with any other family members/significant others, and if so, who? Yes  Plans to Return to Present Housing: Yes  Other Identified Issues/Barriers to RETURNING to current housing: no barriers  Potential Assistance needed at discharge: N/A            Potential DME:  no  Patient expects to discharge to: House  Plan for transportation at discharge: Family    Financial    Payor: MEDICARE / Plan: MEDICARE PART A AND B / Product Type: *No Product type* /     Does insurance require precert for SNF: No    Potential assistance Purchasing Medications:    Meds-to-Beds request:  no      [facility] [place] [address] [facility] [place] P [phone] - f [phone]  [facility] [facility] [place] [address]  Phone: [phone] Fax: [phone]    [address] [phone] -  [phone]  [address] [place] [address]  Phone: [phone] Fax: [phone]      Notes:    Factors facilitating achievement of predicted outcomes: Family support, Good insight into deficits, Has needed Durable Medical Equipment at home, and Knowledge about rehab    Barriers to discharge: Long standing deficits, Impaired vision, and Medical complications    Additional Case Management Notes: 1/1/2024 Medicare/[facility]; from home w/ son, needs care; DME transport WC, BSC, cane, walker, lift chair, O2 prn ([employer]), nebulizer; VNS denies; Deysi KESSLER on T-TH-S @ 1130; IV rocephin; plan to d/c home     The Plan for Transition of Care is related to the following treatment goals of Sepsis due to urinary tract infection (HCC) [A41.9, N39.0]    IF APPLICABLE: The Patient and/or patient representative Lupe and her family were provided with a choice of provider and agrees with the discharge plan. Freedom of choice list with basic

## 2024-01-01 NOTE — CONSULTS
Department of Internal Medicine  Nephrology Bela Martinez MD   Consult Note    Reason for consultation: Management of hemodialysis dependent end-stage renal disease.    Consulting physician: Claudia Stauffer MD.    History of present illness: This is a 73 y.o. 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 Novak with new right arm AV fistula and IJ tunneled hemodialysis catheter], who presented to the hospital for evaluation of 2-day history of progressively worsening confusion and gross hematuria.  She has had multiple recent admissions for altered mental status secondary to urinary tract infection.   New AV fistula infiltrated recently and she has been getting dialysis through IJ tunneled hemodialysis catheter.  Urinalysis was abnormal and suggestive of urinary tract infection.    Vital signs were stable at presentation and she was afebrile.  She was confused during this encounter and history was provided by patient's son who lives with the patient.    Latex, Aleve [naproxen sodium], Apixaban, Naproxen, Pioglitazone, Claritin [loratadine], Keflex [cephalexin], Adhesive tape, Keppra [levetiracetam], and Lisinopril    Past Medical History:   Diagnosis Date    Acute CVA (cerebrovascular accident) ([facility]) 07/25/2020    Acute kidney injury superimposed on CKD ([facility]) 06/08/2022    Acute on chronic combined systolic (congestive) and diastolic (congestive) heart failure ([facility]) 02/06/2022    Acute respiratory failure with hypoxia and hypercapnia ([facility])     JOY (acute kidney injury) ([facility]) 01/15/2022    Arthritis     Asthma     Atrial fibrillation, unspecified type ([facility])     Bilateral lower extremity edema 04/20/2022    Bilateral pleural  Overall Financial Resource Strain (CARDIA)     Difficulty of Paying Living Expenses: Not hard at all   Recent Concern: Financial Resource Strain - Medium Risk (2023)    Overall Financial Resource Strain (CARDIA)     Difficulty of Paying Living Expenses: Somewhat hard   Food Insecurity: No Food Insecurity (2023)    Hunger Vital Sign     Worried About Running Out of Food in the Last Year: Never true     Ran Out of Food in the Last Year: Never true   Recent Concern: Food Insecurity - Food Insecurity Present (2023)    Hunger Vital Sign     Worried About Running Out of Food in the Last Year: Sometimes true     Ran Out of Food in the Last Year: Sometimes true   Transportation Needs: Unknown (2023)    PRAPARE - Transportation     Lack of Transportation (Non-Medical): No   Physical Activity: Inactive (2022)    Exercise Vital Sign     Days of Exercise per Week: 0 days     Minutes of Exercise per Session: 0 min   Housing Stability: Unknown (2023)    Housing Stability Vital Sign     Unstable Housing in the Last Year: No     Review of systems:  Not obtainable due to confusion.    Physical Exam:    VITALS:  /73   Pulse 92   Temp 97.7 °F (36.5 °C) (Oral)   Resp 18   Ht 1.499 m (4' 11\")   Wt 59 kg (130 lb 1.1 oz)   SpO2 100%   BMI 26.27 kg/m²   TEMPERATURE:  Current - Temp: 97.7 °F (36.5 °C); Max - Temp  Av.1 °F (36.7 °C)  Min: 97.4 °F (36.3 °C)  Max: 98.6 °F (37 °C)  RESPIRATIONS RANGE: Resp  Av.2  Min: 16  Max: 35  PULSE RANGE: Pulse  Av.9  Min: 85  Max: 113  BLOOD PRESSURE RANGE:  Systolic (24hrs), Av , Min:104 , Max:145  ; Diastolic (24hrs), Av, Min:57, Max:88   PULSE OXIMETRY RANGE: SpO2  Av.9 %  Min: 97 %  Max: 100 %  24HR INTAKE/OUTPUT:  No intake or output data in the 24 hours ending 24 1156    Constitutional: distracted, fatigued, and Confused    Skin: Skin color, texture, turgor normal. No rashes or lesions    Head: Normocephalic, without  restriction,1-gram phosphorus, 1800 KCal and 1.2 gram/kg protein per day.    2.  Altered mental status - rule out urinary tract infection.  IJ dialysis catheter exit site with no erythema or drainage.  Will panculture and continue empiric antibiotic coverage.  Top differential is urinary tract infection.    3.  Systemic hypertension - blood pressure control is adequate.    4.  Mineral and bone disease profile -  Check serum phosphorus level.    5.  Anemia of chronic kidney disease -  Hemoglobin 11.2 g/dL is within target range.    Prognosis is guarded.    Thank you very much for the courtesy and confidence of this consultation.    Bela Martinez MD FACP  Attending Nephrologist  1/1/2024 11:51 AM

## 2024-01-01 NOTE — PROGRESS NOTES
[facility]   Occupational Therapy Evaluation  Date: 24  Patient Name: Lupe Ojeda       Room: 2125/2125-01  MRN: [id number]  Account: [id number]   : 1950  (73 y.o.) Gender: female     Discharge Recommendations:  Further Occupational Therapy is recommended upon facility discharge.    OT Equipment Recommendations  Other: TBD    Referring Practitioner: Warren Negrete MD  Diagnosis: Sepsis due to UTI      Treatment Diagnosis: Impaired self care status    Past Medical History:  has a past medical history of Acute CVA (cerebrovascular accident) ([facility]), Acute kidney injury superimposed on CKD ([facility]), Acute on chronic combined systolic (congestive) and diastolic (congestive) heart failure ([facility]), Acute respiratory failure with hypoxia and hypercapnia ([facility]), JOY (acute kidney injury) ([facility]), Arthritis, Asthma, Atrial fibrillation, unspecified type ([facility]), Bilateral lower extremity edema, Bilateral pleural effusion, Bradycardia, Chronic diastolic congestive heart failure ([facility]), Chronic ulcer of left leg with fat layer exposed ([facility]), CKD stage 4 secondary to hypertension ([facility]), Dependence on renal dialysis ([facility]), ESRD (end stage renal disease) ([facility]), Essential hypertension, Gastrointestinal hemorrhage, Gout, Hallucinations, Hard of hearing, Head contusion, Hyperkalemia, Iron deficiency anemia, unspecified, Leg ulcer, left, with fat layer exposed ([facility]), Lymphedema, Meningioma ([facility]), Meningioma, cerebral ([facility]), Mild intermittent asthma, uncomplicated, Myocardial infarction ([facility]), Nephrotic range proteinuria, NSTEMI (non-ST elevated myocardial infarction) ([facility]), Obesity (BMI 30-39.9), Old myocardial infarction, Paroxysmal atrial fibrillation ([facility]), Pressure injury of buttock, stage 2 ([facility]), Pressure injury of sacral region, stage 2 ([facility]), Pulmonary edema cardiac cause ([facility]), Pure hypercholesterolemia, Sepsis ([facility]), Severe malnutrition ([facility]), Shock (HCC), Stage 4 chronic kidney disease ([facility]),  bathes on main floor)  Bathroom Toilet: Bedside commode  Bathroom Equipment: 3-in-1 commode  Home Equipment: Cane, Walker, rolling, Wheelchair-manual, Lift chair  ADL Assistance: Needs assistance (Family provides assistance)  Homemaking Assistance: Needs assistance (Family completes)  Homemaking Responsibilities: No  Ambulation Assistance: Non-ambulatory ([employer]ots only)  Transfer Assistance: Needs assistance  Active : No  Patient's  Info: son or daughter  Additional Comments: Pt has 24/7 assist. Son provides assist for transfers, daughter takes pt back and forth to dialysis. Pt is a poor historian, son present to provide answers to social/functional questions.      Objective  Orientation  Overall Orientation Status: Impaired  Orientation Level: Oriented to person, Disoriented to place, Oriented to time           ADL  Feeding: Stand by assistance  Grooming: Stand by assistance  UE Bathing: Stand by assistance  LE Bathing: Dependent/Total  UE Dressing: Stand by assistance  LE Dressing: Dependent/Total  LE Dressing Skilled Clinical Factors: A to don BLE into brief. 2 person A to pull up over hips  Toileting: Dependent/Total  Toileting Skilled Clinical Factors: 2 person A for hygiene and clothing management  Additional Comments: ADL scores based on clinical reasoning and skilled observation unless otherwise noted. Pt currently limited due to decreased strength, balance, visual barriers, cognitive barriers, and activity tolerance impacting safety and independence with self care tasks  Skin Care: Chlorhexidine solution       UE Function  LUE AROM (degrees)  LUE AROM : WFL  Left Hand AROM (degrees)  Left Hand AROM: WFL  Tone LUE  LUE Tone: Normotonic  LUE Strength  Gross LUE Strength: WFL  L Hand General: 4-/5  LUE Strength Comment: Grossly 4-/5    RUE AROM (degrees)  RUE AROM : WFL  Right Hand AROM (degrees)  Right Hand AROM: WFL  Tone RUE  RUE Tone: Normotonic  RUE Strength  Gross RUE Strength: WFL  R Hand  General: 4-/5  RUE Strength Comment: Grossly 4-/5       Fine Motor Skills/Coordination  Coordination  Movements Are Fluid And Coordinated: No  Coordination and Movement Description: Fine motor impairments, Decreased speed, Decreased accuracy, Right UE, Left UE              Bed Mobility  Bed mobility  Supine to Sit: Moderate assistance  Sit to Supine: 2 Person assistance, Maximum assistance  Bed Mobility Comments: Verbal cues for hand placement and safety with increased time to complete due to visual/cognitive barriers. Pt sat EOB with CGA-SBA for safety    Balance  Balance  Sitting Balance: Contact guard assistance (CGA-SBA)  Standing Balance: Dependent/Total (Mod A x 2 - Max A x 1)  Standing Balance  Time: ~1 minute x 1; 2-3 minutes x 1  Activity: Functional transfers and self care tasks  Comment: Significant posterior lean requiring 2 person side by side initially. Able to complete further stand with Max A x 2 from the front blcoking B knees. forward flexed position. High fall risk    Transfers  Transfers  Stand Pivot Transfers: 2 Person assistance, Moderate assistance (Mod A x 2 - Max A x 1)  Sit to stand: 2 Person assistance, Moderate assistance (Mod A x 2 - Max A x 1)  Stand to sit: 2 Person assistance, Moderate assistance (Mod A x 2 - Max A x 1)  Transfer Comments: Transfers initially completed with 2 person A side by side with significant posterior lean noted to complete transfer onto commode. Additional transfer completed with 1 A in the front and 2nd A for safety and self care tasks with BLE blocked. Signifant posterior lean noted. High fall risk.  Toilet Transfers  Toilet - Technique: Stand pivot  Equipment Used: Standard bedside commode  Toilet Transfer: 2 Person assistance, Moderate assistance (Mod A x 2 - Max A x 1)  Toilet Transfers Comments: Transfers initially completed with 2 person A side by side with significant posterior lean noted to complete transfer onto commode. Additional transfer completed

## 2024-01-02 ENCOUNTER — APPOINTMENT (OUTPATIENT)
Dept: GENERAL RADIOLOGY | Age: 74
End: 2024-01-02
Payer: MEDICARE

## 2024-01-02 LAB
AMMONIA PLAS-SCNC: 51 UMOL/L (ref 11–51)
ANION GAP SERPL CALCULATED.3IONS-SCNC: 18 MMOL/L (ref 9–17)
ANTI-XA UNFRAC HEPARIN: <0.1 IU/L (ref 0.3–0.7)
ANTI-XA UNFRAC HEPARIN: <0.1 IU/L (ref 0.3–0.7)
BASOPHILS # BLD: 0 K/UL (ref 0–0.2)
BASOPHILS NFR BLD: 0 % (ref 0–2)
BUN SERPL-MCNC: 15 MG/DL (ref 8–23)
CALCIUM SERPL-MCNC: 8.5 MG/DL (ref 8.6–10.4)
CHLORIDE SERPL-SCNC: 92 MMOL/L (ref 98–107)
CO2 SERPL-SCNC: 22 MMOL/L (ref 20–31)
CREAT SERPL-MCNC: 2.2 MG/DL (ref 0.5–0.9)
EKG ATRIAL RATE: 102 BPM
EKG Q-T INTERVAL: 340 MS
EKG QRS DURATION: 94 MS
EKG QTC CALCULATION (BAZETT): 453 MS
EKG R AXIS: -11 DEGREES
EKG T AXIS: 163 DEGREES
EKG VENTRICULAR RATE: 107 BPM
EOSINOPHIL # BLD: 0 K/UL (ref 0–0.4)
EOSINOPHILS RELATIVE PERCENT: 0 % (ref 0–4)
ERYTHROCYTE [DISTWIDTH] IN BLOOD BY AUTOMATED COUNT: 17.8 % (ref 11.5–14.9)
GFR SERPL CREATININE-BSD FRML MDRD: 23 ML/MIN/1.73M2
GLUCOSE BLD-MCNC: 172 MG/DL (ref 65–105)
GLUCOSE BLD-MCNC: 258 MG/DL (ref 65–105)
GLUCOSE SERPL-MCNC: 135 MG/DL (ref 70–99)
HCT VFR BLD AUTO: 40.1 % (ref 36–46)
HGB BLD-MCNC: 12.8 G/DL (ref 12–16)
LYMPHOCYTES NFR BLD: 0.6 K/UL (ref 1–4.8)
LYMPHOCYTES RELATIVE PERCENT: 5 % (ref 24–44)
MCH RBC QN AUTO: 29.3 PG (ref 26–34)
MCHC RBC AUTO-ENTMCNC: 31.9 G/DL (ref 31–37)
MCV RBC AUTO: 91.9 FL (ref 80–100)
MONOCYTES NFR BLD: 0.6 K/UL (ref 0.1–1.3)
MONOCYTES NFR BLD: 5 % (ref 1–7)
NEUTROPHILS NFR BLD: 90 % (ref 36–66)
NEUTS SEG NFR BLD: 9.7 K/UL (ref 1.3–9.1)
PLATELET # BLD AUTO: 158 K/UL (ref 150–450)
PMV BLD AUTO: 8.1 FL (ref 6–12)
POTASSIUM SERPL-SCNC: 4 MMOL/L (ref 3.7–5.3)
RBC # BLD AUTO: 4.36 M/UL (ref 4–5.2)
SODIUM SERPL-SCNC: 132 MMOL/L (ref 135–144)
TROPONIN I SERPL HS-MCNC: 75 NG/L (ref 0–14)
WBC OTHER # BLD: 10.8 K/UL (ref 3.5–11)

## 2024-01-02 PROCEDURE — 94640 AIRWAY INHALATION TREATMENT: CPT

## 2024-01-02 PROCEDURE — 2060000000 HC ICU INTERMEDIATE R&B

## 2024-01-02 PROCEDURE — 6370000000 HC RX 637 (ALT 250 FOR IP): Performed by: PSYCHIATRY & NEUROLOGY

## 2024-01-02 PROCEDURE — 51798 US URINE CAPACITY MEASURE: CPT

## 2024-01-02 PROCEDURE — 85520 HEPARIN ASSAY: CPT

## 2024-01-02 PROCEDURE — 85025 COMPLETE CBC W/AUTO DIFF WBC: CPT

## 2024-01-02 PROCEDURE — 6370000000 HC RX 637 (ALT 250 FOR IP)

## 2024-01-02 PROCEDURE — 80048 BASIC METABOLIC PNL TOTAL CA: CPT

## 2024-01-02 PROCEDURE — 86705 HEP B CORE ANTIBODY IGM: CPT

## 2024-01-02 PROCEDURE — 94761 N-INVAS EAR/PLS OXIMETRY MLT: CPT

## 2024-01-02 PROCEDURE — 94664 DEMO&/EVAL PT USE INHALER: CPT

## 2024-01-02 PROCEDURE — 82140 ASSAY OF AMMONIA: CPT

## 2024-01-02 PROCEDURE — 36415 COLL VENOUS BLD VENIPUNCTURE: CPT

## 2024-01-02 PROCEDURE — 99233 SBSQ HOSP IP/OBS HIGH 50: CPT | Performed by: INTERNAL MEDICINE

## 2024-01-02 PROCEDURE — 90935 HEMODIALYSIS ONE EVALUATION: CPT

## 2024-01-02 PROCEDURE — 97530 THERAPEUTIC ACTIVITIES: CPT

## 2024-01-02 PROCEDURE — 99223 1ST HOSP IP/OBS HIGH 75: CPT | Performed by: PSYCHIATRY & NEUROLOGY

## 2024-01-02 PROCEDURE — 84484 ASSAY OF TROPONIN QUANT: CPT

## 2024-01-02 PROCEDURE — 2580000003 HC RX 258

## 2024-01-02 PROCEDURE — 6360000002 HC RX W HCPCS

## 2024-01-02 PROCEDURE — 82947 ASSAY GLUCOSE BLOOD QUANT: CPT

## 2024-01-02 PROCEDURE — 71045 X-RAY EXAM CHEST 1 VIEW: CPT

## 2024-01-02 PROCEDURE — 87340 HEPATITIS B SURFACE AG IA: CPT

## 2024-01-02 PROCEDURE — 97535 SELF CARE MNGMENT TRAINING: CPT

## 2024-01-02 PROCEDURE — 93010 ELECTROCARDIOGRAM REPORT: CPT | Performed by: INTERNAL MEDICINE

## 2024-01-02 RX ORDER — LAMOTRIGINE 25 MG/1
25 TABLET ORAL 2 TIMES DAILY
Status: DISCONTINUED | OUTPATIENT
Start: 2024-01-02 | End: 2024-01-04

## 2024-01-02 RX ORDER — QUETIAPINE FUMARATE 50 MG/1
25 TABLET, FILM COATED ORAL NIGHTLY
Status: DISCONTINUED | OUTPATIENT
Start: 2024-01-02 | End: 2024-01-05 | Stop reason: HOSPADM

## 2024-01-02 RX ADMIN — QUETIAPINE FUMARATE 25 MG: 50 TABLET ORAL at 20:01

## 2024-01-02 RX ADMIN — IPRATROPIUM BROMIDE AND ALBUTEROL SULFATE 1 DOSE: 2.5; .5 SOLUTION RESPIRATORY (INHALATION) at 07:41

## 2024-01-02 RX ADMIN — PIPERACILLIN AND TAZOBACTAM 3375 MG: 3; .375 INJECTION, POWDER, LYOPHILIZED, FOR SOLUTION INTRAVENOUS at 05:27

## 2024-01-02 RX ADMIN — LAMOTRIGINE 25 MG: 25 TABLET ORAL at 20:02

## 2024-01-02 RX ADMIN — LAMOTRIGINE 25 MG: 25 TABLET ORAL at 09:41

## 2024-01-02 RX ADMIN — LINEZOLID 600 MG: 2 INJECTION, SOLUTION INTRAVENOUS at 03:47

## 2024-01-02 RX ADMIN — ASPIRIN 81 MG: 81 TABLET, COATED ORAL at 09:41

## 2024-01-02 RX ADMIN — METHYLPREDNISOLONE SODIUM SUCCINATE 40 MG: 40 INJECTION, POWDER, LYOPHILIZED, FOR SOLUTION INTRAMUSCULAR; INTRAVENOUS at 15:18

## 2024-01-02 RX ADMIN — ACETAMINOPHEN 650 MG: 325 TABLET ORAL at 21:17

## 2024-01-02 RX ADMIN — METOPROLOL SUCCINATE 25 MG: 25 TABLET, EXTENDED RELEASE ORAL at 18:05

## 2024-01-02 RX ADMIN — FUROSEMIDE 80 MG: 40 TABLET ORAL at 09:41

## 2024-01-02 RX ADMIN — MIRTAZAPINE 15 MG: 15 TABLET, FILM COATED ORAL at 20:01

## 2024-01-02 RX ADMIN — FAMOTIDINE 10 MG: 20 TABLET, FILM COATED ORAL at 05:28

## 2024-01-02 RX ADMIN — ATORVASTATIN CALCIUM 40 MG: 40 TABLET, FILM COATED ORAL at 18:05

## 2024-01-02 RX ADMIN — LEVOTHYROXINE SODIUM 25 MCG: 0.03 TABLET ORAL at 05:28

## 2024-01-02 RX ADMIN — PIPERACILLIN AND TAZOBACTAM 3375 MG: 3; .375 INJECTION, POWDER, LYOPHILIZED, FOR SOLUTION INTRAVENOUS at 17:08

## 2024-01-02 RX ADMIN — IPRATROPIUM BROMIDE AND ALBUTEROL SULFATE 1 DOSE: 2.5; .5 SOLUTION RESPIRATORY (INHALATION) at 15:30

## 2024-01-02 RX ADMIN — METHYLPREDNISOLONE SODIUM SUCCINATE 40 MG: 40 INJECTION, POWDER, LYOPHILIZED, FOR SOLUTION INTRAMUSCULAR; INTRAVENOUS at 03:45

## 2024-01-02 RX ADMIN — CLOPIDOGREL BISULFATE 75 MG: 75 TABLET ORAL at 09:41

## 2024-01-02 RX ADMIN — AMIODARONE HYDROCHLORIDE 100 MG: 200 TABLET ORAL at 09:41

## 2024-01-02 RX ADMIN — IPRATROPIUM BROMIDE AND ALBUTEROL SULFATE 1 DOSE: 2.5; .5 SOLUTION RESPIRATORY (INHALATION) at 19:53

## 2024-01-02 ASSESSMENT — PAIN DESCRIPTION - LOCATION: LOCATION: HEAD

## 2024-01-02 ASSESSMENT — PAIN DESCRIPTION - DESCRIPTORS: DESCRIPTORS: ACHING

## 2024-01-02 ASSESSMENT — PAIN DESCRIPTION - ORIENTATION: ORIENTATION: MID

## 2024-01-02 ASSESSMENT — PAIN SCALES - GENERAL: PAINLEVEL_OUTOF10: 3

## 2024-01-02 NOTE — PLAN OF CARE
Problem: Discharge Planning  Goal: Discharge to home or other facility with appropriate resources  Outcome: Progressing  Flowsheets (Taken 1/2/2024 0815)  Discharge to home or other facility with appropriate resources:   Identify barriers to discharge with patient and caregiver   Arrange for needed discharge resources and transportation as appropriate   Identify discharge learning needs (meds, wound care, etc)   Refer to discharge planning if patient needs post-hospital services based on physician order or complex needs related to functional status, cognitive ability or social support system   Arrange for interpreters to assist at discharge as needed     Problem: Safety - Adult  Goal: Free from fall injury  Outcome: Progressing  Flowsheets (Taken 1/2/2024 0815)  Free From Fall Injury:   Instruct family/caregiver on patient safety   Based on caregiver fall risk screen, instruct family/caregiver to ask for assistance with transferring infant if caregiver noted to have fall risk factors     Problem: Skin/Tissue Integrity  Goal: Absence of new skin breakdown  Description: 1.  Monitor for areas of redness and/or skin breakdown  2.  Assess vascular access sites hourly  3.  Every 4-6 hours minimum:  Change oxygen saturation probe site  4.  Every 4-6 hours:  If on nasal continuous positive airway pressure, respiratory therapy assess nares and determine need for appliance change or resting period.  Outcome: Progressing     Problem: Chronic Conditions and Co-morbidities  Goal: Patient's chronic conditions and co-morbidity symptoms are monitored and maintained or improved  Outcome: Progressing  Flowsheets (Taken 1/2/2024 0815)  Care Plan - Patient's Chronic Conditions and Co-Morbidity Symptoms are Monitored and Maintained or Improved:   Monitor and assess patient's chronic conditions and comorbid symptoms for stability, deterioration, or improvement   Collaborate with multidisciplinary team to address chronic and comorbid

## 2024-01-02 NOTE — PROGRESS NOTES
Occupational Therapy  [facility]   INPATIENT OCCUPATIONAL THERAPY  PROGRESS NOTE  Date: 2024  Patient Name: Lupe Ojeda       Room:   MRN: [id number]    : 1950  (73 y.o.)  Gender: female   Referring Practitioner: Warren Negrete MD  Diagnosis: Sepsis due to UTI      Discharge Recommendations:  Further Occupational Therapy is recommended upon facility discharge.    OT Equipment Recommendations  Other: TBD    Restrictions/Precautions  Restrictions/Precautions  Restrictions/Precautions: Fall Risk;Contact Precautions  Required Braces or Orthoses?: Yes (R knee brace prn)  Implants present? :  (Pt denies)    O2 Device: None (Room air)    Subjective  Subjective  Pain: Pt denies pain  Comments: SHERITA Puga treatment. Pt with high confusion throughout, pt agreeable to get out of bed.    Objective  Orientation  Overall Orientation Status: Impaired  Orientation Level: Disoriented X4  Cognition  Overall Cognitive Status: Exceptions  Arousal/Alertness: Delayed responses to stimuli;Inconsistent responses to stimuli  Following Commands: Follows one step commands with increased time;Follows one step commands with repetition;Inconsistently follows commands  Attention Span: Attends with cues to redirect  Memory: Decreased short term memory;Decreased recall of recent events  Safety Judgement: Decreased awareness of need for safety;Decreased awareness of need for assistance  Problem Solving: Assistance required to correct errors made;Assistance required to identify errors made;Assistance required to implement solutions;Assistance required to generate solutions;Decreased awareness of errors  Insights: Not aware of deficits  Initiation: Requires cues for all  Sequencing: Requires cues for all  Cognition Comment: very confused, agitated, non sensical throughout session    Activities of Daily Living  ADL  Feeding: Maximum assistance  Feeding Skilled Clinical Factors: Assist to load  food and place utensil into hand, max cues to feed self. Pt is inconsistent.  Grooming: Maximum assistance  UE Bathing: Dependent/Total  LE Bathing: Dependent/Total  UE Dressing: Maximum assistance  LE Dressing: Dependent/Total  Toileting: Dependent/Total  Additional Comments: ADL scores based on clinical reasoning and skilled observation unless otherwise noted. Pt currently limited due to decreased strength, balance, visual barriers, cognitive barriers, and activity tolerance impacting safety and independence with self care tasks  Skin Care: Soap and water;Chlorhexidine solution    Balance  Balance  Sitting Balance: Stand by assistance (seated EOB, forward flexed posture)  Standing Balance: Dependent/Total (max A x2)  Standing Balance  Time: ~15 sec  Activity: functional transfer    Transfers/Mobility  Bed mobility  Supine to Sit: Moderate assistance  Sit to Supine: Unable to assess (remained in chair)  Scooting: Moderate assistance (hips towards EOB)  Transfers  Stand Pivot Transfers: 2 Person assistance;Maximum assistance (EOB>recliner)  Sit to stand: 2 Person assistance;Maximum assistance  Stand to sit: 2 Person assistance;Maximum assistance      Patient Education  Patient Education  Education Given To: Patient  Education Provided: Role of Therapy, Plan of Care, Transfer Training  Education Method: Verbal  Barriers to Learning: Cognition, Hearing, Vision  Education Outcome: Continued education needed    Goals  Short Term Goals  Time Frame for Short Term Goals: By discharge  Short Term Goal 1: Pt will complete lower body dresssing/bathing with Mod A and Fair safety with use of AE as needed  Short Term Goal 2: Pt will complete functional transfers with Mod A and Fair safety with use of least restrictive device  Short Term Goal 3: Pt will tolerate standing 4+ minutes during functional activity of choice with Min A and Fair safety  Short Term Goal 4: Pt will attend to task with no more than 2 verbal cues for

## 2024-01-02 NOTE — CONSULTS
2022 SEBASTIAN Mirza [facility] SPECIAL PROCEDURES    IR TUNNELED CATHETER PLACEMENT GREATER THAN 5 YEARS  2022    IR TUNNELED CATHETER PLACEMENT GREATER THAN 5 YEARS 2022 [facility] SPECIAL PROCEDURES    THORACENTESIS  03/10/2022         TONSILLECTOMY AND ADENOIDECTOMY      UPPER GASTROINTESTINAL ENDOSCOPY N/A 03/15/2022    EGD BIOPSY performed by Manjeet Wall MD at [facility] ENDO    UPPER GASTROINTESTINAL ENDOSCOPY N/A 2022    EGD ESOPHAGOGASTRODUODENOSCOPY performed by Linwood Watts MD at [facility] ENDO       FAMILY HISTORY    Family History   Problem Relation Age of Onset    Diabetes Mother     Heart Disease Mother     Heart Disease Father     Diabetes Sister     High Blood Pressure Sister     Diabetes Maternal Grandmother     Breast Cancer Maternal Cousin        SOCIAL HISTORY    Social History     Tobacco Use    Smoking status: Former     Current packs/day: 0.00     Average packs/day: 0.3 packs/day for 10.0 years (2.5 ttl pk-yrs)     Types: Cigarettes     Start date: 1990     Quit date: 2000     Years since quittin.0     Passive exposure: Past    Smokeless tobacco: Never   Vaping Use    Vaping Use: Never used   Substance Use Topics    Alcohol use: Not Currently     Alcohol/week: 0.0 standard drinks of alcohol    Drug use: No         ALLERGIES    Allergies   Allergen Reactions    Latex Rash    Aleve [Naproxen Sodium]      Chronic kidney disease stage III, CHF    Apixaban      Severe GI bleed, needed blood transfusion      Naproxen      ESKD, CHF    Pioglitazone Other (See Comments)     Congestive heart failure    Claritin [Loratadine]     Keflex [Cephalexin]     Adhesive Tape     Keppra [Levetiracetam]     Lisinopril      Needs clarification of contraindication       HOME MEDICATIONS  Prior to Admission medications    Medication Sig Start Date End Date Taking? Authorizing Provider   furosemide (LASIX) 80 MG tablet Take 1 tablet by mouth every other day Water pill, does not take on dialysis  pancreatic cancer Z80.0    Mild malnutrition ([facility]) E44.1    Decreased strength, endurance, and mobility R53.1, Z74.09, R68.89    Lymphedema I89.0    PVD (peripheral vascular disease) ([facility]) I73.9    Elevated troponin R79.89    ESRD on dialysis ([facility]) N18.6, Z99.2    Hypertensive heart and chronic kidney disease with heart failure and with stage 5 chronic kidney disease, or end stage renal disease ([facility]) I13.2    Gastroesophageal reflux disease K21.9    Moderate anxiety F41.9    Other age-related cataract H25.89    History of GI bleed Z87.19    Chronic combined systolic and diastolic CHF (congestive heart failure) ([facility]) I50.42    Degenerative disease of nervous system, unspecified ([facility]) G31.9    Allergic rhinitis J30.9    Acquired hypothyroidism E03.9    Type 2 diabetes mellitus with polyneuropathy ([facility]) E11.42    Chronic respiratory failure with hypoxia ([facility]) J96.11    Other osteoporosis without current pathological fracture M81.8    Moderate vascular dementia with anxiety ([facility]) F01.B4    Recurrent falls while walking R29.6    Persistent atrial fibrillation ([facility]) I48.19    Slow transit constipation K59.01    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    History of type 2 diabetes mellitus Z86.39    History of chronic atrial fibrillation Z86.79    Encephalopathy G93.40    Bacteremia R78.81    Actinomyces infection A42.9    ESRD (end stage renal disease) on dialysis ([facility]) N18.6, Z99.2    Other seizures G40.89    Pressure ulcer of coccygeal region, stage III ([facility]) L89.153    Bilateral pleural effusion J90    CAD S/P percutaneous coronary angioplasty I25.10, Z98.61    H/O heart artery stent Z95.5    Abnormal echocardiogram R93.1    Mixed hyperlipidemia E78.2    Sepsis due to urinary tract infection ([facility]) A41.9, N39.0    Renovascular hypertension I15.0         Measurements:  Wound 10/18/23 Coccyx wound #1 coccyx (Active)   Wound Image   01/02/24 1447   Wound Etiology Pressure Stage 3 01/02/24 1447   Dressing Status Clean;Dry;Intact    Odor None 01/02/24 1447   Marian-wound Assessment Dry/flaky 01/02/24 1447   Margins Attached edges 01/02/24 1447   Wound Thickness Description not for Pressure Injury Full thickness 12/18/23 1335   Number of days: 15         WOUND ASSESSMENT:   [facility] nurse consult for coccyx wound. Pt was admitted on 12/31 with altered mental status, hematuria, and abdominal pain. She follows with the outpatient wound care center for wounds to her coccyx and lower legs. Upon assessment, pt has a small open area to her coccyx that is red and clean. Surrounding skin red and blanchable. She also has some dry, flaky areas to both lower legs and a scabbed area to her right medial lower leg.     Response to treatment:  Well tolerated by patient.       Plan:     Plan of Care:     Coccyx: Cleanse with soap and water, pat dry. Apply triad cream, cover with foam dressing. Change daily and as needed if loose or soiled.     Legs: no dressing needed, apply lotion with daily care      [x] Turn and reposition every 2 hours while in bed.    [x] Float heels off of bed with pillows under calves.    [] Heel protective boots (heel medix boots) at all times while in bed.    [] Sacral foam dressing to sacrococcygeal area. Use skin barrier film prior to placement. Peel back dressing, inspect skin beneath, and re-secure every shift. Change every 3 days and as needed if loose or soiled. Discontinue sacral foam if repeatedly soiled by incontinence.    [] Apply zinc oxide cream twice daily and as needed after incontinent episodes.    [x] Perform routine incontinence care with use of foam cleanser.    [x] Use single layer moisture wicking underpad.    [x] Use comfort glide system and wedges to reposition patient.    [x] Keep the head of the bed below 30 degrees unless contraindicated.    [] Pressure reducing chair cushion while up to chair. Reposition every hour while in chair and limit chair time to 2 hour intervals.    [x] Encourage good nutritional intake and

## 2024-01-02 NOTE — PLAN OF CARE
Problem: Safety - Adult  Goal: Free from fall injury  Outcome: Progressing     Problem: Skin/Tissue Integrity  Goal: Absence of new skin breakdown  Description: 1.  Monitor for areas of redness and/or skin breakdown  2.  Assess vascular access sites hourly  3.  Every 4-6 hours minimum:  Change oxygen saturation probe site  4.  Every 4-6 hours:  If on nasal continuous positive airway pressure, respiratory therapy assess nares and determine need for appliance change or resting period.  Outcome: Progressing     Problem: Chronic Conditions and Co-morbidities  Goal: Patient's chronic conditions and co-morbidity symptoms are monitored and maintained or improved  Outcome: Progressing  Flowsheets (Taken 1/1/2024 1957)  Care Plan - Patient's Chronic Conditions and Co-Morbidity Symptoms are Monitored and Maintained or Improved:   Monitor and assess patient's chronic conditions and comorbid symptoms for stability, deterioration, or improvement   Collaborate with multidisciplinary team to address chronic and comorbid conditions and prevent exacerbation or deterioration   Update acute care plan with appropriate goals if chronic or comorbid symptoms are exacerbated and prevent overall improvement and discharge

## 2024-01-02 NOTE — CONSULTS
Nephrology ESRD Consult Note    Reason for Consult:  End stage renal disease  Requesting Physician: Dr Stauffer      History of Present Illness:              This is a 73 y.o. female with a significant past medical history of Cerebrovascular accident, atrial fibrillation status post CABG and recent PCI to left circumflex November 2023,  combined diastolic and systolic heart failure, essential hypertension, partial deafness and  ESRD on hemodialysis Tuesday Thursday Saturday at [facility] dialysis unit under Dr Novak, who presents to the ER yesterday with confusion, altered mental status and gross hematuria.  Family reported small amount of hematuria passed by the patient last few days -no fever or flank pain observed.Urinalysis showed leukocyte Estrace, hematuria and WBCs but urine culture is negative.  Chest x-ray showed evidence of diffuse bilateral pulmonary opacities concerning for edema infection and a CT abdomen pelvis showed nonobstructive right renal calculi without hydronephrosis or stranding.  She is empirically started on antibiotics for possible pneumonia.  Laboratory studies showed a WBC of 17, hemoglobin 13.1 with BUN/creatinine of 18 and 2.9 and potassium 4.7.  Troponin was 113.  Patient is seen and examined during dialysis-patient  is confused not oriented to place or time or name.  She has a tacky- arrhythmia up to the 140s.  No chest pain.    Past Medical History:        Diagnosis Date    Acute CVA (cerebrovascular accident) ([facility]) 07/25/2020    Acute kidney injury superimposed on CKD ([facility]) 06/08/2022    Acute on chronic combined systolic (congestive) and diastolic (congestive) heart failure ([facility]) 02/06/2022    Acute respiratory failure with hypoxia and hypercapnia ([facility])     JOY (acute kidney injury) ([facility]) 01/15/2022    Arthritis     Asthma     Atrial fibrillation, unspecified type ([facility])     Bilateral lower extremity edema 04/20/2022    Bilateral pleural effusion 11/20/2023    Bradycardia 06/12/2022

## 2024-01-02 NOTE — PROGRESS NOTES
HEMODIALYSIS POST TREATMENT NOTE    Treatment time ordered: 180    Actual treatment time: 180    UltraFiltration Goal: 1100  UltraFiltration Removed: 1100      Pre Treatment weight: 74.4  Post Treatment weight: 73.3  Estimated Dry Weight: na    Access used:     Central Venous Catheter:          Tunneled or Non-tunneled: tunneled           Site: right chest          Access Flow: good      Internal Access:       AV Fistula or AV Graft: na         Site: na       Access Flow: na       Sign and symptoms of infection: no       If YES: na    Medications or blood products given: no    Chronic outpatient schedule: [facility]    Chronic outpatient unit: [facility]    Summary of response to treatment: the pt tolerated treatment well.    Explain if orders NOT met, was physician notified:varsha      ACES flowsheet faxed to patient unit/ placed in patient chart: yes    Post assessment completed: yes    Report given to: Jeanine Lenz      * Intra-treatment documented Safety Checks include the followin) Access and face visible at all times.     2) All connections and blood lines are secure with no kinks.     3) NVL alarm engaged.     4) Hemosafe device applied (if applicable).     5) No collapse of Arterial or Venous blood chambers.     6) All blood lines / pump segments in the air detectors.

## 2024-01-02 NOTE — CARE COORDINATION
ONGOING DISCHARGE PLAN:    Patient is alert and oriented x4.    Spoke with patient regarding discharge plan and patient confirms that plan is still to discharge to home with no needs    Patient had dialysis today     IV atb     1500 ml fluids    Blood cultures    Labs      Consult neurology     Will continue to follow for additional discharge needs.    If patient is discharged prior to next notation, then this note serves as note for discharge by case management.    Electronically signed by Jaquelin Lee RN on 1/2/2024 at 3:50 PM

## 2024-01-02 NOTE — PROGRESS NOTES
[facility]   Progress Note                   Date:   1/2/2024  Patient name: Lupe Ojeda  Date of admission:  12/31/2023  9:11 AM  MRN:   [id number]  YOB: 1950  PCP: Kayleigh Cardoso MD    Reason for Admission:     Subjective:       Clinical Changes / Abnormalities:  No cp  No sob  In HD  Some confusion       Medications:   Scheduled Meds:   Current Facility-Administered Medications:     famotidine (PEPCID) tablet 10 mg, 10 mg, Oral, Caden BENNETT Hyun-Ryung, MD, 10 mg at 01/02/24 0528    albuterol (PROVENTIL) (2.5 MG/3ML) 0.083% nebulizer solution 2.5 mg, 2.5 mg, Nebulization, Q6H PRN, Warren Negrete MD    atorvastatin (LIPITOR) tablet 40 mg, 40 mg, Oral, QPM, Warren Negrete MD, 40 mg at 01/01/24 1804    amiodarone (CORDARONE) tablet 100 mg, 100 mg, Oral, THUCaden Hyun-Ryung, MD, 100 mg at 01/01/24 1056    aspirin EC tablet 81 mg, 81 mg, Oral, Daily, Warren Negrete MD, 81 mg at 01/01/24 1057    clopidogrel (PLAVIX) tablet 75 mg, 75 mg, Oral, Daily, Warren Negrete MD, 75 mg at 01/01/24 1057    hydrOXYzine HCl (ATARAX) tablet 50 mg, 50 mg, Oral, Daily PRN, Warren Negrete MD, 50 mg at 01/01/24 1953    lamoTRIgine (LAMICTAL) tablet 25 mg, 25 mg, Oral, Daily, Warren Negrete MD, 25 mg at 01/01/24 1104    levothyroxine (SYNTHROID) tablet 25 mcg, 25 mcg, Oral, Caden BENNETT Hyun-Ryung, MD, 25 mcg at 01/02/24 0528    metoprolol succinate (TOPROL XL) extended release tablet 25 mg, 25 mg, Oral, QPM, Warren Negrete MD, 25 mg at 01/01/24 1804    mirtazapine (REMERON) tablet 15 mg, 15 mg, Oral, Nightly, Warren Negrete MD, 15 mg at 01/01/24 1953    ipratropium 0.5 mg-albuterol 2.5 mg (DUONEB) nebulizer solution 1 Dose, 1 Dose, Inhalation, Q4H WA RT, Warren Negrete MD, 1 Dose at 01/02/24 0741    sodium chloride flush 0.9 % injection 5-40 mL, 5-40 mL, IntraVENous, 2 times per day, Warren Negrete MD    sodium chloride flush 0.9 % injection 5-40 mL, 5-40 mL,  97* 96*   CO2 27 29   BUN 18 30*   CREATININE 2.9* 3.9*   GLUCOSE 122* 158*     Hepatic:   Recent Labs     12/31/23  1004   AST 25   ALT 13   BILITOT 0.8   ALKPHOS 134*     Troponin:   Recent Labs     12/31/23  1004 12/31/23  1212 01/01/24  1523   TROPHS 122* 113* 87*     BNP: No results for input(s): \"BNP\" in the last 72 hours.  Lipids: No results for input(s): \"CHOL\", \"HDL\" in the last 72 hours.    Invalid input(s): \"LDLCALCU\"  INR:   Recent Labs     12/31/23  1004 12/31/23  1602   INR 1.0 1.0       Objective:   Vitals: BP (!) 158/74   Pulse 100   Temp 97.3 °F (36.3 °C) (Axillary)   Resp 18   Ht 1.499 m (4' 11\")   Wt 74.4 kg (164 lb 0.4 oz) Comment: bed scale  SpO2 95%   BMI 33.13 kg/m²   General appearance: alert and cooperative with exam  HEENT: Head: Normocephalic, no lesions, without obvious abnormality.  Neck: no adenopathy, no carotid bruit, no JVD, supple, symmetrical, trachea midline and thyroid not enlarged, symmetric, no tenderness/mass/nodules  Lungs: clear to auscultation bilaterally  Heart: regular rate and rhythm, S1, S2 normal, no murmur, click, rub or gallop  Abdomen: soft, non-tender; bowel sounds normal; no masses,  no organomegaly  Extremities: extremities normal, atraumatic, no cyanosis or edema  Neurologic: Mental status: Alert, oriented, thought content appropriate      Echo:    Results for orders placed or performed during the hospital encounter of 02/06/22   ECHO Complete 2D W Doppler W Color   Result Value Ref Range    Left Ventricular Ejection Fraction 55     LVEF MODALITY ECHO     Narrative    [facility]    Transthoracic Echocardiography Report (TTE)     Patient Name HARSH       Date of Study                 02/07/2022                CASS MARTINS      Date of      1950  Gender                        Female   Birth      Age          71 year(s)  Race                                Room Number  2487        Height:                       58.66 inch, 149  PM  ----------------------------------------------------------------------------    ----------------------------------------------------------------------------   Electronically signed by Giuliano Forde(Interpreting physician) on   2022 12:19 PM  ----------------------------------------------------------------------------  FINDINGS  Left Atrium  Left atrial dilatation.  Left Ventricle  Normal left ventricle size and function with an estimated EF > 55%.  No segmental wall motion abnormalities seen.  Moderate left ventricular hypertrophy.  Right Atrium  Right atrial dilatation.  Right Ventricle  Normal right ventricular size and function.  Mitral Valve  Mitral annular calcification is seen.  Mild mitral regurgitation.  Aortic Valve  Aortic valve is trileaflet.  Mild aortic stenosis.  Mild-moderate aortic insufficiency.  Tricuspid Valve  Normal tricuspid valve structure and function.  Mild tricuspid regurgitation.  Normal right ventricular systolic pressure.  Pulmonic Valve  The pulmonic valve is normal in structure. Mild pulmonic insufficiency.  Pericardial Effusion  No significant pericardial effusion is seen.  Pleural Effusion  Pleural effusion present.  Miscellaneous  Normal aortic root dimension.  E/e' average 16.5  IVC normal diameter & inspiratory collapse indicating normal RA filling  pressure .    M-mode / 2D Measurements & Calculations:      LVIDd:4 cm(3.7 - 5.6 cm)         Diastolic Volume:64 ml   LVIDs:2.56 cm(2.2 - 4.0 cm)      Systolic Volume:16.8 ml   IVSd:1.58 cm(0.6 - 1.1 cm)       Aortic Root:2.9 cm(2.0 - 3.7 cm)   LVPWd:1.53 cm(0.6 - 1.1 cm)      LA Dimension: 5.4 cm(1.9 - 4.0 cm)   Fractional Shortenin %       LA volume/Index: 56.5 ml /37m^2   Calculated LVEF (%): 73.75 %     LVOT:2.2 cm      Mitral:                                 Aortic      Valve Area (P1/2-Time): 2.68 cm^2       Peak Velocity: 2.09 m/s   Peak E-Wave: 1.24 m/s                   Mean Velocity: 1.34 m/s   Peak A-Wave:

## 2024-01-02 NOTE — PROGRESS NOTES
[facility]  IN-PATIENT SERVICE  [facility]    PROGRESS NOTE             1/2/2024    10:04 AM    Name:   Lupe Ojeda  MRN:     [id number]     Acct:      [id number]   Room:   2125/2125-01   Day:  2  Admit Date:  12/31/2023  9:11 AM    PCP:  Kayleigh Cardoso MD  Code Status:  Full Code    Subjective:     C/C:   Chief Complaint   Patient presents with    Altered Mental Status    Hematuria    Abdominal Pain     Interval History Status: improved.  Patient was undergoing dialysis, while I saw the patient, patient is not oriented to time place and person, she is disoriented, and talking to herself.  Did not respond to any of my answers.    Brief History:     73 year old female with COPD, HTN, bilateral cataract, ESRD on dialysis TTS, HFrEF, CVA in 2020, CAD s/p stent 11/23, venous insufficiency bilateral LE, and Afib who presented to the ED due to altered mental status and hematuria. Patient is a poor historian due to dementia.      Family reports that patient is a dialysis patient but does make a small amount of urine reportedly had small amount of hematuria over the last couple days and was more altered than baseline.  Family reports that she does call out to family members but has been more confused.      The patient had chest x-ray which showed diffuse bilateral pulmonary opacities concerning for edema or infection.  Urinalysis showed does have leukocyte esterase, hematuria and WBC's.  Was given Zosyn and Vanco in the ED.  She did also have CT abdomen pelvis which showed gallbladder stones/sludge and non-obstructing right renal calculus.     Patient was admitted for treatment of UTI, pneumonia, and Afib with RVR. Was started on broad-spectrum antibiotics Zyvox and Zosyn.  She did also have A-fib with RVR, is on amiodarone at home.  Was taking aspirin and Plavix at home for prior stroke.  Cardiology consulted.    Review of Systems:     Review of Systems   Reason    HFrEF  -Last Echo 10/23 showed EF 40-45%  -Continue home dose metoprolol 25 mg  -Lasix 80 mg not on dialysis days   -Daily I's/O's     Hypothyroidism   -Continue home dose levothyroxine 25 mcg  -Repeat TSH 2.73     ESRD on TTS dialysis  -Nephro on board     Seizures 2/2 right temporal extra-axial mass(meningioma)  -Continue home dose Lamotrigine      Cellulitis/wound  -Bilateral lower extremity cellulitis and wound  -Wounds on her lower back  -Wound ostomy eval and treat     DVT prophylaxis: EPC cuffs  GI prophylaxis: Continue home dose Pepcid 20 mg  Diet: Regular low sodium low potassium low phosphorus 1500 mL fluid restriction  Dispo: tbd      OT/PT/SW Consulted     Code Status: Full    Plan will be discussed with the attending, Dr. Eh Boles MD  PGY I Family Medicine Resident  1/2/2024 10:04 AM     Attending Physician Statement  I have discussed the care of Lupe Ojeda and I have examined the patient myself and taken ROS and HPI, including pertinent history and exam findings, with the resident. I have reviewed the key elements of all parts of the encounter with the resident.  I agree with the assessment, plan and orders as documented by the resident.  Patient seen and examined, was hallucinating, daughter was present at the bedside, going on last couple of weeks and progressively getting worse, MRI ordered currently pending, will consult neurology  Had dialysis today  Cultures remain negative, MRSA is negative will DC Zyvox      Electronically signed by Claudia Stauffer MD

## 2024-01-02 NOTE — CONSULTS
[facility] Neurology   IN-PATIENT SERVICE      NEUROLOGY CONSULT  NOTE            Date:   1/2/2024  Patient name:  Lupe Ojeda  Date of admission:  12/31/2023  YOB: 1950      Chief Complaint:     Chief Complaint   Patient presents with    Altered Mental Status    Hematuria    Abdominal Pain       Reason for Consult:      AMS, hx of meningioma    History of Present Illness:     73-year-old female with past medical history of stroke, CAD status post CABG, hypertension, bilateral cataract, ESRD on HD, CHF, chronic hearing loss, history of right temporal meningioma, cognitive impairment/dementia, afib not on AC due to bleeding risk per cardiology, who is currently admitted for altered mentation in setting of UTI and pneumonia.  Neurology consulted for altered mentation and history of meningioma.  History limited given patient's mentation.  Obtained primarily from daughter at bedside and chart review.  Daughter is a poor historian.    Patient has been admitted since 12/31/2023.  She initially presented as family reported noticing hematuria and increased confusion.  She was found to be in A-fib RVR, had a UTI and pneumonia, started on antibiotics.  Per daughter, her mentation has not significantly improved.  She continues to be hyperactive, hallucinating, having nonsensical speech.    Per daughter, patient has had a gradual decline and worsening cognitive impairment over the last year.  Family noticed that she was becoming more forgetful. She has had multiple hospitalizations, admissions to SNFs. Then, about 6 months ago, she started having episodes of agitation, hallucinations.  Daughter reports that she generally stays up most the night and has visual hallucinations, hyperactive behavior.  During the day, she is better and at times oriented x 2-3. She lives with her son, generally wheelchair or bed bound, requires help for transferring including using the restroom.  She has near complete blindness due  volume loss.      Results for orders placed during the hospital encounter of 12/31/23    CT Head W/O Contrast    Narrative  EXAMINATION:  CT OF THE HEAD WITHOUT CONTRAST  12/31/2023 11:26 am    TECHNIQUE:  CT of the head was performed without the administration of intravenous  contrast. Automated exposure control, iterative reconstruction, and/or weight  based adjustment of the mA/kV was utilized to reduce the radiation dose to as  low as reasonably achievable.    COMPARISON:  10/06/2023    HISTORY:  ORDERING SYSTEM PROVIDED HISTORY: ams  TECHNOLOGIST PROVIDED HISTORY:  ams    Decision Support Exception - unselect if not a suspected or confirmed  emergency medical condition->Emergency Medical Condition (MA)  Reason for Exam: Altered Mental Status; Hematuria; Abdominal Pain. pt  uncooperative unable to hold still for images. 2 sets of images sent due to  patient motion    FINDINGS:  Exam is moderately degraded by motion.    BRAIN/VENTRICLES: There is no acute intracranial hemorrhage, mass effect or  midline shift.  No abnormal extra-axial fluid collection.  Similar right  temporal encephalomalacia.  There is no evidence of hydrocephalus.    ORBITS: The visualized portion of the orbits demonstrate no acute abnormality.    SINUSES: The visualized paranasal sinuses and mastoid air cells demonstrate  no acute abnormality.    SOFT TISSUES/SKULL:  No acute abnormality of the visualized skull or soft  tissues.    Impression  No acute intracranial abnormality.        I personally reviewed all of the above medications, clinical laboratory, imaging and other diagnostic tests.         Impression:      73-year-old female with past medical history of stroke, CAD status post CABG, hypertension, bilateral cataract, ESRD on HD, CHF, chronic hearing loss, history of right temporal meningioma, cognitive impairment/dementia, afib not on AC due to bleeding risk per cardiology, who is currently admitted for altered mentation in setting  underlying medical conditions as per primary team  Non-pharmacological methods to improve delirium include blinds up, TV on during the day, allow to sleep at night, frequent reorientation, etc.   Reorientation strategies (family pictures, well lit room, family reassurance)       We will continue to follow.      Electronically signed by Bushra Palmer DO on 1/2/2024 at 5:01 PM      Bushra Palmer DO  [facility] [place]  Neurology

## 2024-01-02 NOTE — PROGRESS NOTES
HEMODIALYSIS PRE-TREATMENT NOTE    Patient Identifiers prior to treatment: name, band and birthdate    Isolation Required: VRE                      Isolation Type: Contact       (please document if patient is being managed as a PUI/COVID-19 patient)        Hepatitis status:                           Date Drawn                             Result  Hepatitis B Surface Antigen 12/21/2023     neg                     Hepatitis B Surface Antibody 12/21/2023 neg        Hepatitis B Core Antibody            How was Hepatitis Status verified: labs and chart     Was a copy of the labs you documented provided to facility for the patient's chart: yes    Hemodialysis orders verified: yes    Access Within normal limits ( I.e. s/s of infection,...): yes     Pre-Assessment completed: yes By Poonam Lenz    Pre-dialysis report received from: Rn                      Time: 0945

## 2024-01-03 ENCOUNTER — APPOINTMENT (OUTPATIENT)
Dept: NEUROLOGY | Age: 74
End: 2024-01-03
Payer: MEDICARE

## 2024-01-03 ENCOUNTER — APPOINTMENT (OUTPATIENT)
Dept: GENERAL RADIOLOGY | Age: 74
End: 2024-01-03
Payer: MEDICARE

## 2024-01-03 ENCOUNTER — APPOINTMENT (OUTPATIENT)
Dept: MRI IMAGING | Age: 74
End: 2024-01-03
Payer: MEDICARE

## 2024-01-03 ENCOUNTER — HOSPITAL ENCOUNTER (OUTPATIENT)
Dept: WOUND CARE | Age: 74
Discharge: HOME | End: 2024-01-03

## 2024-01-03 ENCOUNTER — TELEPHONE (OUTPATIENT)
Dept: WOUND CARE | Age: 74
End: 2024-01-03

## 2024-01-03 LAB
ANION GAP SERPL CALCULATED.3IONS-SCNC: 14 MMOL/L (ref 9–17)
BASOPHILS # BLD: 0 K/UL (ref 0–0.2)
BASOPHILS NFR BLD: 0 % (ref 0–2)
BUN SERPL-MCNC: 23 MG/DL (ref 8–23)
CALCIUM SERPL-MCNC: 7.8 MG/DL (ref 8.6–10.4)
CHLORIDE SERPL-SCNC: 97 MMOL/L (ref 98–107)
CO2 SERPL-SCNC: 24 MMOL/L (ref 20–31)
CREAT SERPL-MCNC: 2.9 MG/DL (ref 0.5–0.9)
EOSINOPHIL # BLD: 0 K/UL (ref 0–0.4)
EOSINOPHILS RELATIVE PERCENT: 0 % (ref 0–4)
ERYTHROCYTE [DISTWIDTH] IN BLOOD BY AUTOMATED COUNT: 18 % (ref 11.5–14.9)
FOLATE SERPL-MCNC: 11.1 NG/ML (ref 4.8–24.2)
GFR SERPL CREATININE-BSD FRML MDRD: 16 ML/MIN/1.73M2
GLUCOSE BLD-MCNC: 139 MG/DL (ref 65–105)
GLUCOSE BLD-MCNC: 151 MG/DL (ref 65–105)
GLUCOSE BLD-MCNC: 175 MG/DL (ref 65–105)
GLUCOSE SERPL-MCNC: 156 MG/DL (ref 70–99)
HBV CORE IGM SERPL QL IA: NONREACTIVE
HBV SURFACE AG SERPL QL IA: NONREACTIVE
HCT VFR BLD AUTO: 35.2 % (ref 36–46)
HGB BLD-MCNC: 11.2 G/DL (ref 12–16)
LYMPHOCYTES NFR BLD: 0.4 K/UL (ref 1–4.8)
LYMPHOCYTES RELATIVE PERCENT: 7 % (ref 24–44)
MCH RBC QN AUTO: 29.4 PG (ref 26–34)
MCHC RBC AUTO-ENTMCNC: 31.7 G/DL (ref 31–37)
MCV RBC AUTO: 92.6 FL (ref 80–100)
MONOCYTES NFR BLD: 0.4 K/UL (ref 0.1–1.3)
MONOCYTES NFR BLD: 7 % (ref 1–7)
NEUTROPHILS NFR BLD: 86 % (ref 36–66)
NEUTS SEG NFR BLD: 4.6 K/UL (ref 1.3–9.1)
PLATELET # BLD AUTO: 136 K/UL (ref 150–450)
PMV BLD AUTO: 8.1 FL (ref 6–12)
POTASSIUM SERPL-SCNC: 3.7 MMOL/L (ref 3.7–5.3)
RBC # BLD AUTO: 3.8 M/UL (ref 4–5.2)
SODIUM SERPL-SCNC: 135 MMOL/L (ref 135–144)
T PALLIDUM AB SER QL IA: NONREACTIVE
VIT B12 SERPL-MCNC: 947 PG/ML (ref 232–1245)
WBC OTHER # BLD: 5.3 K/UL (ref 3.5–11)

## 2024-01-03 PROCEDURE — 36415 COLL VENOUS BLD VENIPUNCTURE: CPT

## 2024-01-03 PROCEDURE — 82947 ASSAY GLUCOSE BLOOD QUANT: CPT

## 2024-01-03 PROCEDURE — 6370000000 HC RX 637 (ALT 250 FOR IP)

## 2024-01-03 PROCEDURE — 85025 COMPLETE CBC W/AUTO DIFF WBC: CPT

## 2024-01-03 PROCEDURE — 82607 VITAMIN B-12: CPT

## 2024-01-03 PROCEDURE — 80048 BASIC METABOLIC PNL TOTAL CA: CPT

## 2024-01-03 PROCEDURE — 94640 AIRWAY INHALATION TREATMENT: CPT

## 2024-01-03 PROCEDURE — 6360000002 HC RX W HCPCS: Performed by: PSYCHIATRY & NEUROLOGY

## 2024-01-03 PROCEDURE — 2580000003 HC RX 258

## 2024-01-03 PROCEDURE — 95819 EEG AWAKE AND ASLEEP: CPT

## 2024-01-03 PROCEDURE — 99233 SBSQ HOSP IP/OBS HIGH 50: CPT | Performed by: INTERNAL MEDICINE

## 2024-01-03 PROCEDURE — 6360000002 HC RX W HCPCS

## 2024-01-03 PROCEDURE — 99232 SBSQ HOSP IP/OBS MODERATE 35: CPT | Performed by: PSYCHIATRY & NEUROLOGY

## 2024-01-03 PROCEDURE — 71045 X-RAY EXAM CHEST 1 VIEW: CPT

## 2024-01-03 PROCEDURE — 6370000000 HC RX 637 (ALT 250 FOR IP): Performed by: PSYCHIATRY & NEUROLOGY

## 2024-01-03 PROCEDURE — 84425 ASSAY OF VITAMIN B-1: CPT

## 2024-01-03 PROCEDURE — 82746 ASSAY OF FOLIC ACID SERUM: CPT

## 2024-01-03 PROCEDURE — 86780 TREPONEMA PALLIDUM: CPT

## 2024-01-03 PROCEDURE — 94761 N-INVAS EAR/PLS OXIMETRY MLT: CPT

## 2024-01-03 PROCEDURE — 2060000000 HC ICU INTERMEDIATE R&B

## 2024-01-03 RX ORDER — LORAZEPAM 2 MG/ML
1 INJECTION INTRAMUSCULAR ONCE
Status: COMPLETED | OUTPATIENT
Start: 2024-01-03 | End: 2024-01-03

## 2024-01-03 RX ADMIN — LEVOTHYROXINE SODIUM 25 MCG: 0.03 TABLET ORAL at 09:59

## 2024-01-03 RX ADMIN — IPRATROPIUM BROMIDE AND ALBUTEROL SULFATE 1 DOSE: 2.5; .5 SOLUTION RESPIRATORY (INHALATION) at 12:07

## 2024-01-03 RX ADMIN — IPRATROPIUM BROMIDE AND ALBUTEROL SULFATE 1 DOSE: 2.5; .5 SOLUTION RESPIRATORY (INHALATION) at 07:41

## 2024-01-03 RX ADMIN — AMIODARONE HYDROCHLORIDE 100 MG: 200 TABLET ORAL at 09:59

## 2024-01-03 RX ADMIN — IPRATROPIUM BROMIDE AND ALBUTEROL SULFATE 1 DOSE: 2.5; .5 SOLUTION RESPIRATORY (INHALATION) at 15:31

## 2024-01-03 RX ADMIN — CLOPIDOGREL BISULFATE 75 MG: 75 TABLET ORAL at 10:00

## 2024-01-03 RX ADMIN — PIPERACILLIN AND TAZOBACTAM 3375 MG: 3; .375 INJECTION, POWDER, LYOPHILIZED, FOR SOLUTION INTRAVENOUS at 17:35

## 2024-01-03 RX ADMIN — QUETIAPINE FUMARATE 25 MG: 50 TABLET ORAL at 20:40

## 2024-01-03 RX ADMIN — ATORVASTATIN CALCIUM 40 MG: 40 TABLET, FILM COATED ORAL at 17:30

## 2024-01-03 RX ADMIN — FAMOTIDINE 10 MG: 20 TABLET, FILM COATED ORAL at 10:00

## 2024-01-03 RX ADMIN — METHYLPREDNISOLONE SODIUM SUCCINATE 40 MG: 40 INJECTION, POWDER, LYOPHILIZED, FOR SOLUTION INTRAMUSCULAR; INTRAVENOUS at 03:22

## 2024-01-03 RX ADMIN — LAMOTRIGINE 25 MG: 25 TABLET ORAL at 20:40

## 2024-01-03 RX ADMIN — ASPIRIN 81 MG: 81 TABLET, COATED ORAL at 10:00

## 2024-01-03 RX ADMIN — MIRTAZAPINE 15 MG: 15 TABLET, FILM COATED ORAL at 20:40

## 2024-01-03 RX ADMIN — IPRATROPIUM BROMIDE AND ALBUTEROL SULFATE 1 DOSE: 2.5; .5 SOLUTION RESPIRATORY (INHALATION) at 21:43

## 2024-01-03 RX ADMIN — LORAZEPAM 1 MG: 2 INJECTION INTRAMUSCULAR; INTRAVENOUS at 10:39

## 2024-01-03 RX ADMIN — PIPERACILLIN AND TAZOBACTAM 3375 MG: 3; .375 INJECTION, POWDER, LYOPHILIZED, FOR SOLUTION INTRAVENOUS at 05:29

## 2024-01-03 RX ADMIN — METOPROLOL SUCCINATE 25 MG: 25 TABLET, EXTENDED RELEASE ORAL at 17:30

## 2024-01-03 RX ADMIN — LAMOTRIGINE 25 MG: 25 TABLET ORAL at 10:00

## 2024-01-03 NOTE — PLAN OF CARE
Problem: Discharge Planning  Goal: Discharge to home or other facility with appropriate resources  Outcome: Progressing     Problem: Safety - Adult  Goal: Free from fall injury  Outcome: Progressing     Problem: Skin/Tissue Integrity  Goal: Absence of new skin breakdown  Description: 1.  Monitor for areas of redness and/or skin breakdown  2.  Assess vascular access sites hourly  3.  Every 4-6 hours minimum:  Change oxygen saturation probe site  4.  Every 4-6 hours:  If on nasal continuous positive airway pressure, respiratory therapy assess nares and determine need for appliance change or resting period.  Outcome: Progressing     Problem: Chronic Conditions and Co-morbidities  Goal: Patient's chronic conditions and co-morbidity symptoms are monitored and maintained or improved  Outcome: Progressing

## 2024-01-03 NOTE — CARE COORDINATION
ONGOING DISCHARGE PLAN:    Patient is alert and oriented x4.    Spoke with patient regarding discharge plan and patient confirms that plan is still to discharge to home     Patient and family refuses vns and rehab placement    MRI today     AT    Neurology on board    Will continue to follow for additional discharge needs.    If patient is discharged prior to next notation, then this note serves as note for discharge by case management.    Electronically signed by Jaquelin Lee RN on 1/3/2024 at 2:33 PM

## 2024-01-03 NOTE — PROGRESS NOTES
[facility]  IN-PATIENT SERVICE  [facility]    PROGRESS NOTE             1/3/2024    8:05 AM    Name:   Lupe Ojeda  MRN:     [id number]     Acct:      [id number]   Room:   2125/2125-01   Day:  3  Admit Date:  12/31/2023  9:11 AM    PCP:  Kayleigh Cardoso MD  Code Status:  Full Code    Subjective:     C/C:   Chief Complaint   Patient presents with    Altered Mental Status    Hematuria    Abdominal Pain     Interval History Status: not changed.    Patient is seen and examined at bedside, patient was sleeping on my encounter, daughter Svitlana on the bedside present.  Patient had sundowning episode yesterday, and was extremely agitated last night, though she was able to get some sleep.  Patient still has vivid hallucination whenever she wakes up, unable to answers any of the questions.    We attempted to do an MRI with the help of Ativan, that time was unsuccessful, neurologist suggested MRI with contrast if possible, will discussed with her attending for the possibility of reattempting.    Brief History:     73 year old female with COPD, HTN, bilateral cataract, ESRD on dialysis TTS, HFrEF, CVA in 2020, CAD s/p stent 11/23, venous insufficiency bilateral LE, and Afib who presented to the ED due to altered mental status and hematuria. Patient is a poor historian due to dementia.      Family reports that patient is a dialysis patient but does make a small amount of urine reportedly had small amount of hematuria over the last couple days and was more altered than baseline.  Family reports that she does call out to family members but has been more confused.      The patient had chest x-ray which showed diffuse bilateral pulmonary opacities concerning for edema or infection.  Urinalysis showed does have leukocyte esterase, hematuria and WBC's.  Was given Zosyn and Vanco in the ED.  She did also have CT abdomen pelvis which showed gallbladder stones/sludge and  unremarkable.  Mild circumferential bladder wall thickening and perivesicular stranding some of which is likely related to underdistention.  Extensive vascular calcifications.  No pathologically enlarged adenopathy or significant free fluid. Peritoneum/Retroperitoneum: The aorta is normal in caliber with severe atherosclerosis.  Stable subcentimeter retroperitoneal lymph nodes.  No obvious pathologically enlarged adenopathy.  No ascites or drainable fluid collection. Bones/Soft Tissues: Mild subcutaneous edema.  Osteopenia.  No acute osseous abnormality.     1. No acute abdominal or pelvic abnormality on this unenhanced exam within the limitations of the exam due to significant motion artifact. 2. Nonobstructing right renal calculus. 3. Gallbladder sludge versus stones. 4. Small bilateral pleural effusions with adjacent atelectasis.     CT Head W/O Contrast    Result Date: 12/31/2023  EXAMINATION: CT OF THE HEAD WITHOUT CONTRAST  12/31/2023 11:26 am TECHNIQUE: CT of the head was performed without the administration of intravenous contrast. Automated exposure control, iterative reconstruction, and/or weight based adjustment of the mA/kV was utilized to reduce the radiation dose to as low as reasonably achievable. COMPARISON: 10/06/2023 HISTORY: ORDERING SYSTEM PROVIDED HISTORY: ams TECHNOLOGIST PROVIDED HISTORY: ams Decision Support Exception - unselect if not a suspected or confirmed emergency medical condition->Emergency Medical Condition (MA) Reason for Exam: Altered Mental Status; Hematuria; Abdominal Pain. pt uncooperative unable to hold still for images. 2 sets of images sent due to patient motion FINDINGS: Exam is moderately degraded by motion. BRAIN/VENTRICLES: There is no acute intracranial hemorrhage, mass effect or midline shift.  No abnormal extra-axial fluid collection.  Similar right temporal encephalomalacia.  There is no evidence of hydrocephalus. ORBITS: The visualized portion of the orbits  many white count. Urine culture negative  -Blood cultures pending  -CT head 12/31: no acute intracranial abnormality  -CXR 12/31: diffuse bilateral pulmonary opacities could represent edema or infection   -Respiratory panel, Legionella, strep pneumo negative  -MRI ordered, results awaited  -Neurology on board, disinterest in doing vitamin B1 folate and vitamin B12  -MRI attempted with the help of Ativan, attempt unsuccessful     Afib with RVR  -previous history of Afib  -EKG 12/31 showed A-fib with RVR  -Echo 10/25/23 showed EF 40 to 45%, left atrial dilation, tricuspid regurgitation, and mild aortic stenosis and regurgitation  -Continue home dose amiodarone  -Heparin drip held  -Cardio signed out with the recommendation of continuing with aspirin and Plavix  Atrial Fibrillation CHADSVASC2 Score Stroke Risk:   73 y.o. 65-74 - 1   female Female - 1   CHF HX: Yes - 1   HTN HX: Yes - 1   Stroke/TIA/Thromboembolism Yes _2   Vascular Disease HX: Yes - 1   Diabetes Mellitus No - 0   CHADSVASC 2 Score 7      Annual Stroke Risk 11.2% - moderate- high      CAD s/p stent 11/23  -CABG with LIMA-LAD, SVG-OM and SVG-RCA in 2003   -Trop 122>113>83  -EKG 12/31 ST & T wave abnormality, consider lateral ischemia  -Continue home dose aspirin and plavix  -Continue Lipitor 40 mg   -Cardiology on board and following     COPD  -Continue home Spiriva  -DuoNeb neb every 4 hours  -Albuterol neb every 6 hours prn  -Methylprednisolone 40 mg every 12 hours  -No wheezing on exam today     HFrEF  -Last Echo 10/23 showed EF 40-45%  -Continue home dose metoprolol 25 mg  -Lasix 80 mg not on dialysis days   -Daily I's/O's     Hypothyroidism   -Continue home dose levothyroxine 25 mcg  -Repeat TSH 2.73     ESRD on TTS dialysis  -Nephro on board     Seizures 2/2 right temporal extra-axial mass(meningioma)  -Continue home dose Lamotrigine      Cellulitis/wound  -Bilateral lower extremity cellulitis and wound  -Wounds on her lower back  -Wound ostomy

## 2024-01-03 NOTE — CONSULTS
Nephrology ESRD Consult Note    Reason for Consult:  End stage renal disease  Requesting Physician: Dr Stauffer    Interval History:  Patient is pleasantly confused but she is oriented to name and place.  She went for an MRI brain without contrast but canceled due to patient's restlessness.  She had dialysis yesterday with 1.1 L removed    History of Present Illness:              This is a 73 y.o. female with a significant past medical history of Cerebrovascular accident, atrial fibrillation status post CABG and recent PCI to left circumflex November 2023,  combined diastolic and systolic heart failure, essential hypertension, partial deafness and  ESRD on hemodialysis Tuesday Thursday Saturday at [facility] dialysis unit under Dr Novak, who presents to the ER yesterday with confusion, altered mental status and gross hematuria.  Family reported small amount of hematuria passed by the patient last few days -no fever or flank pain observed.Urinalysis showed leukocyte Estrace, hematuria and WBCs but urine culture is negative.  Chest x-ray showed evidence of diffuse bilateral pulmonary opacities concerning for edema infection and a CT abdomen pelvis showed nonobstructive right renal calculi without hydronephrosis or stranding.  She is empirically started on antibiotics for possible pneumonia.  Laboratory studies showed a WBC of 17, hemoglobin 13.1 with BUN/creatinine of 18 and 2.9 and potassium 4.7.  Troponin was 113.  Patient is seen and examined during dialysis-patient  is confused not oriented to place or time or name.  She has a tacky- arrhythmia up to the 140s.  No chest pain.    Past Medical History:        Diagnosis Date    Acute CVA (cerebrovascular accident) ([facility]) 07/25/2020    Acute kidney injury superimposed on CKD ([facility]) 06/08/2022    Acute on chronic combined systolic (congestive) and diastolic (congestive) heart failure ([facility]) 02/06/2022    Acute respiratory failure with hypoxia and hypercapnia ([facility])     JOY (acute     Overall Financial Resource Strain (CARDIA)     Difficulty of Paying Living Expenses: Somewhat hard   Food Insecurity: No Food Insecurity (2023)    Hunger Vital Sign     Worried About Running Out of Food in the Last Year: Never true     Ran Out of Food in the Last Year: Never true   Recent Concern: Food Insecurity - Food Insecurity Present (2023)    Hunger Vital Sign     Worried About Running Out of Food in the Last Year: Sometimes true     Ran Out of Food in the Last Year: Sometimes true   Transportation Needs: Unknown (2023)    PRAPARE - Transportation     Lack of Transportation (Medical): Not on file     Lack of Transportation (Non-Medical): No   Physical Activity: Inactive (2022)    Exercise Vital Sign     Days of Exercise per Week: 0 days     Minutes of Exercise per Session: 0 min   Stress: Not on file   Social Connections: Not on file   Intimate Partner Violence: Not on file   Housing Stability: Unknown (2023)    Housing Stability Vital Sign     Unable to Pay for Housing in the Last Year: Not on file     Number of Places Lived in the Last Year: Not on file     Unstable Housing in the Last Year: No       Family History:   Family History   Problem Relation Age of Onset    Diabetes Mother     Heart Disease Mother     Heart Disease Father     Diabetes Sister     High Blood Pressure Sister     Diabetes Maternal Grandmother     Breast Cancer Maternal Cousin        Review of Systems:    Pertinent positives stated above in HPI. All other systems were reviewed and were negative.    Objective:  Constitutional:    CURRENT TEMPERATURE:  Temp: 97.7 °F (36.5 °C)  MAXIMUM TEMPERATURE OVER 24HRS:  Temp (24hrs), Av.9 °F (36.6 °C), Min:97.2 °F (36.2 °C), Max:98.4 °F (36.9 °C)    CURRENT RESPIRATORY RATE:  Respirations: 16  CURRENT PULSE:  Pulse: 86  CURRENT BLOOD PRESSURE:  BP: 133/63  24HR BLOOD PRESSURE RANGE:  Systolic (24hrs), Av , Min:95 , Max:147   ; Diastolic (24hrs), Av, Min:46,  tunneled cath   Essential hypertension  Anemia of chronic kidney disease-hemoglobin 11.1 no indication for epogen  Acute encephalopathy likely metabolic with underlying dementia  Type 2 diabetes  History of multivessel disease CAD S/P CABG with PCI to LCX on 11/23   Secondary hyperparathyroidism  Gross hematuria with urine culture negative-urology recommendations noted    Plan:  1.  Continue TTS schedule  2.  Continue empiric IV antibiotics dosed to current GFR  3. 2 gram sodium diet and 1500 mls fluid restriction  4..Ongoing neurology evaluation for altered mental status  5.ultrafilter fluid to her previously determined outpatient dry weight  6.  Basic metabolic panel CBC daily      Robbi Triana M.D, [facility]  Nephrologist    Thank you for the consultation.

## 2024-01-03 NOTE — PLAN OF CARE
Attempted MRI 1/3 @ 1140. Pt uncooperative, cussing at techs, unable to tolerate anything on her head to be able to proceed with MRI. SHERITA Echeverria notified. Please call [phone] with any questions.   Thank you

## 2024-01-03 NOTE — TELEPHONE ENCOUNTER
Patient's [facility] wound care appointment canceled today due to her being in the hospital

## 2024-01-03 NOTE — PROCEDURES
EEG REPORT       Patient: Lupe Ojeda Age: 73 y.o.  MRN: [id number]      Referring Provider: No ref. provider found    History: This routine 30 minute scalp EEG was recorded with video- monitoring for a 73 y.o.. female  who presented with encephalopathy. This EEG was performed to evaluate for focal and epileptiform abnormalities.     Lupe Ojeda   Current Facility-Administered Medications   Medication Dose Route Frequency Provider Last Rate Last Admin    QUEtiapine (SEROQUEL) tablet 25 mg  25 mg Oral Nightly Bushra Palmer, DO   25 mg at 01/02/24 2001    lamoTRIgine (LAMICTAL) tablet 25 mg  25 mg Oral BID Bushra Palmer, DO   25 mg at 01/03/24 1000    famotidine (PEPCID) tablet 10 mg  10 mg Oral Warren Masterson MD   10 mg at 01/03/24 1000    albuterol (PROVENTIL) (2.5 MG/3ML) 0.083% nebulizer solution 2.5 mg  2.5 mg Nebulization Q6H PRN Warren Negrete MD        atorvastatin (LIPITOR) tablet 40 mg  40 mg Oral QPM Warren Negrete MD   40 mg at 01/02/24 1805    amiodarone (CORDARONE) tablet 100 mg  100 mg Oral Warren Jason MD   100 mg at 01/03/24 0959    aspirin EC tablet 81 mg  81 mg Oral Daily Warren Negrete MD   81 mg at 01/03/24 1000    clopidogrel (PLAVIX) tablet 75 mg  75 mg Oral Daily Warren Negrete MD   75 mg at 01/03/24 1000    hydrOXYzine HCl (ATARAX) tablet 50 mg  50 mg Oral Daily PRN Warren Negrete MD   50 mg at 01/01/24 1953    levothyroxine (SYNTHROID) tablet 25 mcg  25 mcg Oral Warren Masterson MD   25 mcg at 01/03/24 0959    metoprolol succinate (TOPROL XL) extended release tablet 25 mg  25 mg Oral QPM Warren Negrete MD   25 mg at 01/02/24 1805    mirtazapine (REMERON) tablet 15 mg  15 mg Oral Nightly Warren Negrete MD   15 mg at 01/02/24 2001    ipratropium 0.5 mg-albuterol 2.5 mg (DUONEB) nebulizer solution 1 Dose  1 Dose Inhalation Q4H WA Warren Hawkins MD   1 Dose at 01/03/24 1531    sodium chloride flush 0.9 % injection 5-40 mL  5-40  discharges with an epileptogenic in the right and left temporal occipital region; focal epilepsy cannot be excluded.  Clinical correlation recommended.     There was also continuous right hemisphere polymorphic delta and theta slowing, more prominent in the temporal region, suggestive of underlying focal cerebral dysfunction.    3.  Polymorphic delta and theta slowing is suggestive of mild to moderate encephalopathy.    Chinmay Fleming MD

## 2024-01-03 NOTE — PROGRESS NOTES
Barbara Pearl, CONSTANZA   Patient:  STEPHANIE RAMOS   YOB: 1977  MRN:  [id number]  Location: [facility] Care    2124-01  1/3/24 4:02 PM   [phone] Hospital or Facility: [facility] PRO CARE From: Marisa Garcia RE: STEPHANIE RAMOS RM: 2124-01 Goals of Care  Unr
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
## 2024-01-03 NOTE — CARE COORDINATION
Writer spoke to the patient and her daughter Mica.  Both declined need for VNS or SNF.  Explained to them both that she has straight medicare and that she would have home care covered at 100% and she is able to stay at a rehab facility 100% for the first 20 days.  Mica explains that she understands and still declines .//tv

## 2024-01-03 NOTE — PROGRESS NOTES
[facility]   IN-PATIENT SERVICE      NEUROLOGY PROGRESS  NOTE            Date:   1/3/2024  Patient name:  Lupe Ojeda  Date of admission:  12/31/2023  YOB: 1950      Interval History:     No acute events.  Continued waxing and waning mentation.  Overall however, for me, she appears improved, is less hyperactive, and more directable.  Started on seroquel overnight. MRI brain was attempted with Ativan, she became agitated and could not tolerate.  Daughter at bedside.    History of Present Illness:     73-year-old female with past medical history of stroke, CAD status post CABG, hypertension, bilateral cataract, ESRD on HD, CHF, chronic hearing loss, history of right temporal meningioma, cognitive impairment/dementia, afib not on AC due to bleeding risk per cardiology, who is currently admitted for altered mentation in setting of UTI and pneumonia.  Neurology consulted for altered mentation and history of meningioma.  History limited given patient's mentation.  Obtained primarily from daughter at bedside and chart review.  Daughter is a poor historian.     Patient has been admitted since 12/31/2023.  She initially presented as family reported noticing hematuria and increased confusion.  She was found to be in A-fib RVR, had a UTI and pneumonia, started on antibiotics.  Per daughter, her mentation has not significantly improved.  She continues to be hyperactive, hallucinating, having nonsensical speech.     Per daughter, patient has had a gradual decline and worsening cognitive impairment over the last year.  Family noticed that she was becoming more forgetful. She has had multiple hospitalizations, admissions to SNFs. Then, about 6 months ago, she started having episodes of agitation, hallucinations.  Daughter reports that she generally stays up most the night and has visual hallucinations, hyperactive behavior.  During the day, she is better and at times oriented x 2-3. She lives with her  Chronic ulcer of left leg with fat layer exposed (HCC) 06/21/2022    CKD stage 4 secondary to hypertension (HCC) 02/20/2020    Dependence on renal dialysis (HCC)     John Norris, Sat    ESRD (end stage renal disease) (Formerly [facility])     Essential hypertension 07/25/2020    Gastrointestinal hemorrhage 07/20/2022    Gout     Hallucinations 02/18/2021    Hard of hearing     Head contusion 09/09/2020    Hyperkalemia 06/28/2022    Iron deficiency anemia, unspecified     Leg ulcer, left, with fat layer exposed (HCC) 04/20/2022    Lymphedema 05/11/2022    Meningioma (HCC) 02/25/2021    Meningioma, cerebral (Formerly [facility]) 07/26/2020    Mild intermittent asthma, uncomplicated 07/18/2022    Myocardial infarction (Formerly [facility])     Nephrotic range proteinuria     NSTEMI (non-ST elevated myocardial infarction) (Formerly [facility]) 07/22/2023    Obesity (BMI 30-39.9) 06/14/2022    Old myocardial infarction 07/18/2022    Paroxysmal atrial fibrillation ([facility]) 07/28/2020    Pressure injury of buttock, stage 2 (HCC) 11/25/2022    Pressure injury of sacral region, stage 2 (HCC) 11/11/2022    Pulmonary edema cardiac cause (Formerly [facility])     Pure hypercholesterolemia     Sepsis (HCC) 07/12/2022    Severe malnutrition (HCC) 07/22/2022    Shock (HCC)     Stage 4 chronic kidney disease (Formerly [facility])     Symptomatic anemia     Toxic metabolic encephalopathy 07/26/2020    Type 2 diabetes mellitus with polyneuropathy (HCC) 02/22/2023    Type 2 diabetes mellitus with stage 4 chronic kidney disease, without long-term current use of insulin (HCC)     Ulcer of right leg, with fat layer exposed ([facility]) 05/20/2022    Unspecified venous (peripheral) insufficiency     Unspecified vitamin D deficiency         Past Surgical History:     Past Surgical History:   Procedure Laterality Date    AV FISTULA CREATION Right     CARDIAC CATHETERIZATION  2020    CARDIAC CATHETERIZATION  11/27/2023    CARDIAC PROCEDURE N/A 11/27/2023    Left heart cath / coronary angiography w grafts performed by Ken Mina MD at [facility]

## 2024-01-04 ENCOUNTER — APPOINTMENT (OUTPATIENT)
Dept: MRI IMAGING | Age: 74
End: 2024-01-04
Payer: MEDICARE

## 2024-01-04 ENCOUNTER — APPOINTMENT (OUTPATIENT)
Dept: CT IMAGING | Age: 74
End: 2024-01-04
Payer: MEDICARE

## 2024-01-04 PROBLEM — Z71.89 GOALS OF CARE, COUNSELING/DISCUSSION: Status: ACTIVE | Noted: 2024-01-04

## 2024-01-04 PROBLEM — N30.01 ACUTE CYSTITIS WITH HEMATURIA: Status: ACTIVE | Noted: 2024-01-04

## 2024-01-04 PROBLEM — Z71.89 DNR (DO NOT RESUSCITATE) DISCUSSION: Status: ACTIVE | Noted: 2024-01-04

## 2024-01-04 PROBLEM — F05 DELIRIUM DUE TO ANOTHER MEDICAL CONDITION: Status: ACTIVE | Noted: 2021-05-26

## 2024-01-04 PROBLEM — Z71.89 ACP (ADVANCE CARE PLANNING): Status: ACTIVE | Noted: 2024-01-04

## 2024-01-04 PROBLEM — Z51.5 PALLIATIVE CARE ENCOUNTER: Status: ACTIVE | Noted: 2024-01-04

## 2024-01-04 LAB
ANION GAP SERPL CALCULATED.3IONS-SCNC: 16 MMOL/L (ref 9–17)
BASOPHILS # BLD: 0 K/UL (ref 0–0.2)
BASOPHILS NFR BLD: 0 % (ref 0–2)
BUN SERPL-MCNC: 36 MG/DL (ref 8–23)
CALCIUM SERPL-MCNC: 7.6 MG/DL (ref 8.6–10.4)
CHLORIDE SERPL-SCNC: 98 MMOL/L (ref 98–107)
CO2 SERPL-SCNC: 25 MMOL/L (ref 20–31)
CREAT SERPL-MCNC: 4.3 MG/DL (ref 0.5–0.9)
EOSINOPHIL # BLD: 0 K/UL (ref 0–0.4)
EOSINOPHILS RELATIVE PERCENT: 0 % (ref 0–4)
ERYTHROCYTE [DISTWIDTH] IN BLOOD BY AUTOMATED COUNT: 17.7 % (ref 11.5–14.9)
GFR SERPL CREATININE-BSD FRML MDRD: 10 ML/MIN/1.73M2
GLUCOSE SERPL-MCNC: 149 MG/DL (ref 70–99)
HCT VFR BLD AUTO: 37.5 % (ref 36–46)
HGB BLD-MCNC: 12.2 G/DL (ref 12–16)
LYMPHOCYTES NFR BLD: 1 K/UL (ref 1–4.8)
LYMPHOCYTES RELATIVE PERCENT: 13 % (ref 24–44)
MCH RBC QN AUTO: 29.8 PG (ref 26–34)
MCHC RBC AUTO-ENTMCNC: 32.5 G/DL (ref 31–37)
MCV RBC AUTO: 91.8 FL (ref 80–100)
MONOCYTES NFR BLD: 0.8 K/UL (ref 0.1–1.3)
MONOCYTES NFR BLD: 11 % (ref 1–7)
NEUTROPHILS NFR BLD: 76 % (ref 36–66)
NEUTS SEG NFR BLD: 5.4 K/UL (ref 1.3–9.1)
PLATELET # BLD AUTO: 141 K/UL (ref 150–450)
PMV BLD AUTO: 8 FL (ref 6–12)
POTASSIUM SERPL-SCNC: 3.8 MMOL/L (ref 3.7–5.3)
RBC # BLD AUTO: 4.09 M/UL (ref 4–5.2)
SODIUM SERPL-SCNC: 139 MMOL/L (ref 135–144)
WBC OTHER # BLD: 7.3 K/UL (ref 3.5–11)

## 2024-01-04 PROCEDURE — 6360000004 HC RX CONTRAST MEDICATION

## 2024-01-04 PROCEDURE — 94761 N-INVAS EAR/PLS OXIMETRY MLT: CPT

## 2024-01-04 PROCEDURE — 6370000000 HC RX 637 (ALT 250 FOR IP)

## 2024-01-04 PROCEDURE — 99222 1ST HOSP IP/OBS MODERATE 55: CPT | Performed by: NURSE PRACTITIONER

## 2024-01-04 PROCEDURE — 94640 AIRWAY INHALATION TREATMENT: CPT

## 2024-01-04 PROCEDURE — 85025 COMPLETE CBC W/AUTO DIFF WBC: CPT

## 2024-01-04 PROCEDURE — 6360000002 HC RX W HCPCS

## 2024-01-04 PROCEDURE — 90792 PSYCH DIAG EVAL W/MED SRVCS: CPT | Performed by: PSYCHIATRY & NEUROLOGY

## 2024-01-04 PROCEDURE — 70470 CT HEAD/BRAIN W/O & W/DYE: CPT

## 2024-01-04 PROCEDURE — 99233 SBSQ HOSP IP/OBS HIGH 50: CPT | Performed by: PSYCHIATRY & NEUROLOGY

## 2024-01-04 PROCEDURE — 80048 BASIC METABOLIC PNL TOTAL CA: CPT

## 2024-01-04 PROCEDURE — 99233 SBSQ HOSP IP/OBS HIGH 50: CPT | Performed by: INTERNAL MEDICINE

## 2024-01-04 PROCEDURE — 2060000000 HC ICU INTERMEDIATE R&B

## 2024-01-04 PROCEDURE — 90935 HEMODIALYSIS ONE EVALUATION: CPT

## 2024-01-04 PROCEDURE — 6370000000 HC RX 637 (ALT 250 FOR IP): Performed by: PSYCHIATRY & NEUROLOGY

## 2024-01-04 PROCEDURE — 2580000003 HC RX 258

## 2024-01-04 PROCEDURE — 36415 COLL VENOUS BLD VENIPUNCTURE: CPT

## 2024-01-04 RX ORDER — SODIUM CHLORIDE 0.9 % (FLUSH) 0.9 %
10 SYRINGE (ML) INJECTION PRN
Status: DISCONTINUED | OUTPATIENT
Start: 2024-01-04 | End: 2024-01-05 | Stop reason: HOSPADM

## 2024-01-04 RX ORDER — LACOSAMIDE 100 MG/1
100 TABLET ORAL 2 TIMES DAILY
Status: DISCONTINUED | OUTPATIENT
Start: 2024-01-04 | End: 2024-01-05 | Stop reason: HOSPADM

## 2024-01-04 RX ORDER — LORAZEPAM 2 MG/ML
2 INJECTION INTRAMUSCULAR ONCE
Status: COMPLETED | OUTPATIENT
Start: 2024-01-04 | End: 2024-01-04

## 2024-01-04 RX ORDER — 0.9 % SODIUM CHLORIDE 0.9 %
100 INTRAVENOUS SOLUTION INTRAVENOUS ONCE
Status: COMPLETED | OUTPATIENT
Start: 2024-01-04 | End: 2024-01-04

## 2024-01-04 RX ADMIN — AMIODARONE HYDROCHLORIDE 100 MG: 200 TABLET ORAL at 11:59

## 2024-01-04 RX ADMIN — SODIUM CHLORIDE 100 ML: 9 INJECTION, SOLUTION INTRAVENOUS at 14:36

## 2024-01-04 RX ADMIN — PIPERACILLIN AND TAZOBACTAM 3375 MG: 3; .375 INJECTION, POWDER, LYOPHILIZED, FOR SOLUTION INTRAVENOUS at 05:45

## 2024-01-04 RX ADMIN — IPRATROPIUM BROMIDE AND ALBUTEROL SULFATE 1 DOSE: 2.5; .5 SOLUTION RESPIRATORY (INHALATION) at 07:42

## 2024-01-04 RX ADMIN — IPRATROPIUM BROMIDE AND ALBUTEROL SULFATE 1 DOSE: 2.5; .5 SOLUTION RESPIRATORY (INHALATION) at 21:00

## 2024-01-04 RX ADMIN — FAMOTIDINE 10 MG: 20 TABLET, FILM COATED ORAL at 05:40

## 2024-01-04 RX ADMIN — IPRATROPIUM BROMIDE AND ALBUTEROL SULFATE 1 DOSE: 2.5; .5 SOLUTION RESPIRATORY (INHALATION) at 15:59

## 2024-01-04 RX ADMIN — LORAZEPAM 2 MG: 2 INJECTION INTRAMUSCULAR; INTRAVENOUS at 13:10

## 2024-01-04 RX ADMIN — CLOPIDOGREL BISULFATE 75 MG: 75 TABLET ORAL at 13:28

## 2024-01-04 RX ADMIN — SODIUM CHLORIDE, PRESERVATIVE FREE 10 ML: 5 INJECTION INTRAVENOUS at 14:36

## 2024-01-04 RX ADMIN — HYDROXYZINE HYDROCHLORIDE 50 MG: 50 TABLET, FILM COATED ORAL at 02:32

## 2024-01-04 RX ADMIN — LEVOTHYROXINE SODIUM 25 MCG: 0.03 TABLET ORAL at 05:40

## 2024-01-04 RX ADMIN — FUROSEMIDE 80 MG: 40 TABLET ORAL at 13:29

## 2024-01-04 RX ADMIN — IOPAMIDOL 75 ML: 755 INJECTION, SOLUTION INTRAVENOUS at 14:35

## 2024-01-04 RX ADMIN — LAMOTRIGINE 25 MG: 25 TABLET ORAL at 13:29

## 2024-01-04 RX ADMIN — ASPIRIN 81 MG: 81 TABLET, COATED ORAL at 13:27

## 2024-01-04 RX ADMIN — PIPERACILLIN AND TAZOBACTAM 3375 MG: 3; .375 INJECTION, POWDER, LYOPHILIZED, FOR SOLUTION INTRAVENOUS at 17:40

## 2024-01-04 NOTE — PROGRESS NOTES
Comprehensive Nutrition Assessment    Type and Reason for Visit:  Wound    Nutrition Recommendations/Plan:   Continue diet and supplements as ordered.     Malnutrition Assessment:  Malnutrition Status:  At risk for malnutrition (Comment) (01/04/24 6639)    Context:  Acute Illness     Findings of the 6 clinical characteristics of malnutrition:  Energy Intake:  No significant decrease in energy intake  Weight Loss:  Unable to assess     Body Fat Loss:  Unable to assess     Muscle Mass Loss:  Unable to assess    Fluid Accumulation:  Mild Extremities   Strength:  Not Performed    Nutrition Assessment:    Pt brought to ED for altered mental status and abdominal pain. Pt lives with her son and typically can have a conversation and knows where she's at.  Daughter present at time of visit stating pt ate 100% of breakfast, she brought pt de león, egg McMuffin. Yesterday at lunch daughter brought her breakfast type foods from [employer], pt enjoy's breakfast foods. Pt is on a renal diet on dialysis T-Th-Sat; observed 2 cans of potato chips family brought her. While writer in the room pt mumbling very disoriented.    Nutrition Related Findings:    edema: +1 non-pitting BLE's. Hx: PVD, vascular dementia, COPD, former smoker, ESRD on dialysis, Meningioma. Labs & meds reviewed. Wound Type: Stage III, Wound Consult Pending       Current Nutrition Intake & Therapies:    Average Meal Intake: % (when foods brought in by family from the outside)  Average Supplements Intake: %  ADULT DIET; Regular; Low Sodium (2 gm); Low Potassium (Less than 3000 mg/day); Low Phosphorus (Less than 1000 mg); 1500 ml  ADULT ORAL NUTRITION SUPPLEMENT; Lunch, Dinner; Frozen Oral Supplement    Anthropometric Measures:  Height: 149.9 cm (4' 11\")  Ideal Body Weight (IBW): 95 lbs (43 kg)    Admission Body Weight: 65.8 kg (145 lb)  Current Body Weight: 65.8 kg (145 lb), 152.6 % IBW. Weight Source: Bed Scale  Current BMI (kg/m2): 29.3                           BMI Categories: Overweight (BMI 25.0-29.9)    Estimated Daily Nutrient Needs:  Energy Requirements Based On: Kcal/kg  Weight Used for Energy Requirements: Admission  Energy (kcal/day): 8164-0161 kcals based on 25-26 kcals/kg  Weight Used for Protein Requirements: Ideal  Protein (g/day): 77-86 gm protein based on 1.8-2 gm/kg     Fluid (ml/day): 1500 ml per MD    Nutrition Diagnosis:   Increased nutrient needs related to other (comment) (healing) as evidenced by wounds    Nutrition Interventions:   Food and/or Nutrient Delivery: Continue Current Diet, Continue Oral Nutrition Supplement     Coordination of Nutrition Care: Continue to monitor while inpatient       Goals:     Goals: PO intake 75% or greater       Nutrition Monitoring and Evaluation:   Behavioral-Environmental Outcomes: None Identified  Food/Nutrient Intake Outcomes: Food and Nutrient Intake, Supplement Intake  Physical Signs/Symptoms Outcomes: Biochemical Data, Nutrition Focused Physical Findings, Skin, Weight    Discharge Planning:    Too soon to determine     Jannet Montes De Oca RD, LD  [phone]

## 2024-01-04 NOTE — CARE COORDINATION
ONGOING DISCHARGE PLAN:    Patient is confused     Daughter at the bedside    Dialysis today     Repeat urinalysis     Kidney/Bladder ultrasound     Family again refused need for VNS or SNF    Will continue to follow for additional discharge needs.    If patient is discharged prior to next notation, then this note serves as note for discharge by case management.    Electronically signed by Jaquelin Lee RN on 1/4/2024 at 12:41 PM

## 2024-01-04 NOTE — PROGRESS NOTES
Perfect serve sent to resident about patients HR being 116 and gotten up to 132. She did not receive her morning meds due to going to dialysis.

## 2024-01-04 NOTE — PROGRESS NOTES
HEMODIALYSIS PRE-TREATMENT NOTE    Patient Identifiers prior to treatment: Name  MRN    Isolation Required: Yes                      Isolation Type: Contact       (please document if patient is being managed as a PUI/COVID-19 patient)        Hepatitis status:                           Date Drawn                             Result  Hepatitis B Surface Antigen 2024     NEG                     Hepatitis B Surface Antibody N/A N/A        Hepatitis B Core Antibody 2024 NEG          How was Hepatitis Status verified: Epic Chart     Was a copy of the labs you documented provided to facility for the patient's chart: Yes    Hemodialysis orders verified: Yes by Omaira Kay    Access Within normal limits ( I.e. s/s of infection,...): WNL     Pre-Assessment completed: Yes by Omaira Kay    Pre-dialysis report received from: Jacki                      Time: 930

## 2024-01-04 NOTE — PROGRESS NOTES
[facility]   IN-PATIENT SERVICE      NEUROLOGY PROGRESS  NOTE            Date:   1/4/2024  Patient name:  Lupe Ojeda  Date of admission:  12/31/2023  YOB: 1950      Interval History:     1/4: Per daughter, mentation overall improved.  EEG was done yesterday.  MRI was again attempted today, unsuccessful.  Subsequently CT brain with and without contrast was obtained.  She was given Ativan for MRI and is lethargic on my assessment today.    1/3: No acute events.  Continued waxing and waning mentation.  Overall however, for me, she appears improved, is less hyperactive, and more directable.  Started on seroquel overnight. MRI brain was attempted with Ativan, she became agitated and could not tolerate.  Daughter at bedside.    History of Present Illness:     73-year-old female with past medical history of stroke, CAD status post CABG, hypertension, bilateral cataract, ESRD on HD, CHF, chronic hearing loss, history of right temporal meningioma, cognitive impairment/dementia, afib not on AC due to bleeding risk per cardiology, who is currently admitted for altered mentation in setting of UTI and pneumonia.  Neurology consulted for altered mentation and history of meningioma.  History limited given patient's mentation.  Obtained primarily from daughter at bedside and chart review.  Daughter is a poor historian.     Patient has been admitted since 12/31/2023.  She initially presented as family reported noticing hematuria and increased confusion.  She was found to be in A-fib RVR, had a UTI and pneumonia, started on antibiotics.  Per daughter, her mentation has not significantly improved.  She continues to be hyperactive, hallucinating, having nonsensical speech.     Per daughter, patient has had a gradual decline and worsening cognitive impairment over the last year.  Family noticed that she was becoming more forgetful. She has had multiple hospitalizations, admissions to SNFs. Then, about 6  temporal lobe meningioma. Previous EEG with no epileptiform discharges. Repeat EEG with atypical epileptiform appearing discharges time locked with photic (reviewed personally), ?photoparoxysmal response. No seizures. She has not had clinical seizures or seizure-like activity.  Subclinical seizures are in the differential although less likely.     Acute encephalopathy/hyperactive delirium, likely toxic-metabolic, ddx as above, mildly improved  History of right temporal meningioma  ?Seizure history, on prophylactic ASM (lamotrigine)     Plan:      MRI brain w/o ordered, attempted three times but she could not tolerate due to agitation. Ideally, MRI brain w/wo would be higher yield to further assess meningioma/growth. She does have ESRD but type II lissa agents are generally safe in this patient population. Discussed with primary team who stated nephrology prefers to avoid contrast. At this time, will dc order given multiple unsuccessful events.  On solumedrol, primary team to dc as may be contributing to hyperactive delirium.  Was on LTG once daily. Increased to home dose of 25 mg BID. Given abnormal EEG and her underlying temporal mass, will change to LCM to assure she is on therapeutic ASM dose as LTG up-titration will be longer. She has not had clinical seizure activity previously.  Vitamin B12, folate, syphilis screen, wnl, f/u vitamin B1. Previous TSH, ammonia wnl.  Continue seroquel 25 mg qhs  Psych consulted, f/u recs.  Avoid benzos or any other sedative medications.  Treatment of underlying medical conditions as per primary team  Non-pharmacological methods to improve delirium include blinds up, TV on during the day, allow to sleep at night, frequent reorientation, etc.   Reorientation strategies (family pictures, well lit room, family reassurance)  Discussed conservative, non-pharmacological methods for delirium at length with daughter and RN.        We will continue to follow. Above discussed with primary  attending, Dr. Stauffer.      Electronically signed by Bushra Palmer DO on 1/4/2024 at 2:52 PM      Bushra Palmer DO  [facility]  Neurology

## 2024-01-04 NOTE — PROGRESS NOTES
Nephrology ESRD Progress Note    Reason for Consult:  Management of hemodialysis dependent End stage renal disease    Requesting Physician: Claudia Stauffer MD    Interval History: Patient was seen and examined today and she remains pleasantly confused.  She is not in respiratory distress.  She received Seroquel last night.  EEG performed yesterday showed polymorphic delta and theta slowing consistent with mild to moderate encephalopathy.  Urinalysis was remarkable for leukocyturia and hemoglobinuria but urine culture was negative.    History of Present Illness:  This is a 73 y.o. female with a significant past medical history of Cerebrovascular accident, atrial fibrillation status post CABG and recent PCI to left circumflex November 2023,  combined diastolic and systolic heart failure, essential hypertension, partial deafness and  ESRD on hemodialysis Tuesday Thursday Saturday at [facility] dialysis unit under Dr Novak, who presents to the ER yesterday with confusion, altered mental status and gross hematuria.  Family reported small amount of hematuria passed by the patient last few days -no fever or flank pain observed.Urinalysis showed leukocyte Estrace, hematuria and WBCs but urine culture is negative.  Chest x-ray showed evidence of diffuse bilateral pulmonary opacities concerning for edema infection and a CT abdomen pelvis showed nonobstructive right renal calculi without hydronephrosis or stranding.  She is empirically started on antibiotics for possible pneumonia.  Laboratory studies showed a WBC of 17, hemoglobin 13.1 with BUN/creatinine of 18 and 2.9 and potassium 4.7.  Troponin was 113.  Patient is seen and examined during dialysis-patient  is confused not oriented to place or time or name.  She has a tacky- arrhythmia up to the 140s.  No chest pain.    Current Medications:    QUEtiapine (SEROQUEL) tablet 25 mg, Nightly  lamoTRIgine (LAMICTAL) tablet 25 mg, BID  famotidine (PEPCID) tablet 10 mg, QAM AC  albuterol  and pharynx normal. Moist  NECK: Neck supple, non-tender without lymphadenopathy, masses or thyromegaly. JVD-neg  CARDIAC: Normal S1 and S2. No S3, S4 or murmurs. Rhythm is regular.  LUNGS: Clear to auscultation and percussion without rales, rhonchi, wheezing or diminished breath sounds.  ABDOMEN: Soft non distended, BS+ Non tender no organomegaly  MUSKULOSKELETAL: Adequately aligned spine. No joint erythema or tenderness.   EXTREMITIES: No edema. Peripheral pulses intact.   NEURO:  confused and lethargic but arousable.    Labs:     CBC:   Recent Labs     01/02/24  1530 01/03/24  0457 01/04/24  0456   WBC 10.8 5.3 7.3   RBC 4.36 3.80* 4.09   HGB 12.8 11.2* 12.2   HCT 40.1 35.2* 37.5   MCV 91.9 92.6 91.8   MCH 29.3 29.4 29.8   MCHC 31.9 31.7 32.5   RDW 17.8* 18.0* 17.7*    136* 141*   MPV 8.1 8.1 8.0        BMP:   Recent Labs     01/02/24  1530 01/03/24  0457 01/04/24  0456   * 135 139   K 4.0 3.7 3.8   CL 92* 97* 98   CO2 22 24 25   BUN 15 23 36*   CREATININE 2.2* 2.9* 4.3*   GLUCOSE 135* 156* 149*   CALCIUM 8.5* 7.8* 7.6*     Assessment/plan:    1.  End-stage renal disease - secondary to diabetic nephropathy.  Will maintain TTS hemodialysis schedule using right IJ tunneled hemodialysis catheter. Patient will get acute hemodialysis today.  Renal diet,i.e 2-gram sodium,2-gram potassium,1500 ml fluid restriction,1-gram phosphorus, 1800 KCal and 1.2 gram/kg protein per day.    2.  Altered mental status - secondary to toxic/metabolic encephalopathy. Urine and blood cultures were negative.  Will repeat urinalysis.    3.  Coronary artery disease - s/p CABG.    4.  Microscopic hematuria - may be consequent to chronic cystitis, bladder mass would need to be ruled out.  Will get kidney/bladder ultrasound.     Prognosis is guarded.  Discussed with patient's daughter    Bela FONTANA MD FACP  Attending Clinical Nephrologist

## 2024-01-04 NOTE — CONSULTS
..  Palliative Care Inpatient Consult    NAME:  Lupe Ojeda  MEDICAL RECORD NUMBER:  [id number]  AGE: 73 y.o.   GENDER: female  : 1950  TODAY'S DATE:  2024    Reasons for Consultation:    Symptom and/or pain management  Provision of information regarding PC and/or hospice philosophies  Complex, time-intensive communication and interdisciplinary psychosocial support  Clarification of goals of care and/or assistance with difficult decision-making  Guidance in regards to resources and transition(s)    Members of PC team contributing to this consultation are : Barbara Pearl, Palliative Care APRN-CNP    Plan      Palliative Interaction: I went to see patient and she was off unit for HD. Daughter Svitlana was at bedside and is on contact list. I explained palliative role to her.    I discussed patients chronic history and current hospitalized problems. She reports patient has history of dementia but typically can have conversation and knows where she is at and about her medical conditions. She reports that she lives at home and son and her live with her and are home 24/7 to assist with her care. She reports that patient only transfers with assist to [facility] and uses WC. She reports patient is [place] and is almost blind d/t cataracts. She reports needing clearance for surgery. She reports believing her being off AC d/t surgery but was on previously. She reports history of meningioma and awaiting MRI to make sure no changes. She reports knowing previous CT was negative. She reports mentation not back to baseline.     I discussed goals and she reports that patient has always had a will to live. She reports that patient told her to \"keep her alive no matter what. \" She reports patient being a full code and has no previous DNRs that she is aware of.     I discussed patients chronic conditions and how they are progressive. I discussed the importance of talking with patient if baseline returns to make sure patient wants  diabetes mellitus with polyneuropathy ([facility]) 02/22/2023    Type 2 diabetes mellitus with stage 4 chronic kidney disease, without long-term current use of insulin ([facility])     Ulcer of right leg, with fat layer exposed ([facility]) 05/20/2022    Unspecified venous (peripheral) insufficiency     Unspecified vitamin D deficiency        PAST SURGICAL HISTORY  Past Surgical History:   Procedure Laterality Date    AV FISTULA CREATION Right     CARDIAC CATHETERIZATION  2020    CARDIAC CATHETERIZATION  11/27/2023    CARDIAC PROCEDURE N/A 11/27/2023    Left heart cath / coronary angiography w grafts performed by Ken Mina MD at [facility] CARDIAC CATH LAB    CARDIAC PROCEDURE N/A 11/27/2023    Percutaneous coronary intervention performed by Ken Mina MD at [facility] CARDIAC CATH LAB    CARDIAC PROCEDURE N/A 11/27/2023    Intravascular ultrasound performed by Ken Mina MD at [facility] CARDIAC CATH LAB    CARDIOVERSION      COLONOSCOPY N/A 03/15/2022    COLONOSCOPY DIAGNOSTIC performed by Manjeet Wall MD at [facility]    CORONARY ARTERY BYPASS GRAFT      x 3, Dr. chavis    INTUBATION  03/07/2022         IR NONTUNNELED VASCULAR CATHETER  06/28/2022    IR NONTUNNELED VASCULAR CATHETER 6/28/2022 SEBASTIAN Mirza [facility] SPECIAL PROCEDURES    IR TUNNELED CATHETER PLACEMENT GREATER THAN 5 YEARS  07/08/2022    IR TUNNELED CATHETER PLACEMENT GREATER THAN 5 YEARS 7/8/2022 [facility] SPECIAL PROCEDURES    THORACENTESIS  03/10/2022         TONSILLECTOMY AND ADENOIDECTOMY      UPPER GASTROINTESTINAL ENDOSCOPY N/A 03/15/2022    EGD BIOPSY performed by Manjeet Wall MD at [facility]    UPPER GASTROINTESTINAL ENDOSCOPY N/A 07/21/2022    EGD ESOPHAGOGASTRODUODENOSCOPY performed by Linwood Watts MD at [facility]       SOCIAL HISTORY  Social History     Tobacco Use    Smoking status: Former     Current packs/day: 0.00     Average packs/day: 0.3 packs/day for 10.0 years (2.5 ttl pk-yrs)     Types: Cigarettes     Start date: 1/1/1990     Quit  date: 2000     Years since quittin.0     Passive exposure: Past    Smokeless tobacco: Never   Vaping Use    Vaping Use: Never used   Substance Use Topics    Alcohol use: Not Currently     Alcohol/week: 0.0 standard drinks of alcohol    Drug use: No       FAMILY HISTORY  ..  Family History   Problem Relation Age of Onset    Diabetes Mother     Heart Disease Mother     Heart Disease Father     Diabetes Sister     High Blood Pressure Sister     Diabetes Maternal Grandmother     Breast Cancer Maternal Cousin         ALLERGIES  Allergies   Allergen Reactions    Latex Rash    Aleve [Naproxen Sodium]      Chronic kidney disease stage III, CHF    Apixaban      Severe GI bleed, needed blood transfusion      Naproxen      ESKD, CHF    Pioglitazone Other (See Comments)     Congestive heart failure    Claritin [Loratadine]     Keflex [Cephalexin]     Adhesive Tape     Keppra [Levetiracetam]     Lisinopril      Needs clarification of contraindication         MEDICATIONS  Current Medications    QUEtiapine  25 mg Oral Nightly    lamoTRIgine  25 mg Oral BID    famotidine  10 mg Oral QAM AC    atorvastatin  40 mg Oral QPM    amiodarone  100 mg Oral QAM    aspirin  81 mg Oral Daily    clopidogrel  75 mg Oral Daily    levothyroxine  25 mcg Oral QAM AC    metoprolol succinate  25 mg Oral QPM    mirtazapine  15 mg Oral Nightly    ipratropium 0.5 mg-albuterol 2.5 mg  1 Dose Inhalation Q4H WA RT    sodium chloride flush  5-40 mL IntraVENous 2 times per day    furosemide  80 mg Oral Every Other Day    piperacillin-tazobactam  3,375 mg IntraVENous Q12H     albuterol, hydrOXYzine HCl, sodium chloride flush, sodium chloride, ondansetron **OR** ondansetron, polyethylene glycol, acetaminophen **OR** acetaminophen  IV Drips/Infusions   sodium chloride       Home Medications  No current facility-administered medications on file prior to encounter.     Current Outpatient Medications on File Prior to Encounter   Medication Sig Dispense

## 2024-01-04 NOTE — PLAN OF CARE
Problem: Discharge Planning  Goal: Discharge to home or other facility with appropriate resources  Outcome: Progressing     Problem: Safety - Adult  Goal: Free from fall injury  Outcome: Progressing     Problem: Skin/Tissue Integrity  Goal: Absence of new skin breakdown  Description: 1.  Monitor for areas of redness and/or skin breakdown  2.  Assess vascular access sites hourly  3.  Every 4-6 hours minimum:  Change oxygen saturation probe site  4.  Every 4-6 hours:  If on nasal continuous positive airway pressure, respiratory therapy assess nares and determine need for appliance change or resting period.  Outcome: Progressing     Problem: Chronic Conditions and Co-morbidities  Goal: Patient's chronic conditions and co-morbidity symptoms are monitored and maintained or improved  Outcome: Progressing     Problem: Nutrition Deficit:  Goal: Optimize nutritional status  Outcome: Progressing

## 2024-01-04 NOTE — PROGRESS NOTES
Occupational Therapy  [facility]   OCCUPATIONAL THERAPY MISSED TREATMENT NOTE   INPATIENT   Date: 24  Patient Name: Lupe Ojeda       Room:   MRN: [id number]   Account #: [id number]    : 1950  (73 y.o.)  Gender: female   Referring Practitioner: Warren Negrete MD  Diagnosis: Sepsis due to UTI             REASON FOR MISSED TREATMENT:  Patient unable to participate   -   Testing Pt getting transported to MRI per PTA PJ at 1311. Pt was in dialysis all morning. OT/PT will re attempt for therapy as able.             Electronically signed by DEEJAY Albright on 24 at 1:24 PM EST

## 2024-01-04 NOTE — PLAN OF CARE
Problem: Discharge Planning  Goal: Discharge to home or other facility with appropriate resources  1/4/2024 0025 by Gloria Ribera RN  Outcome: Progressing  1/3/2024 1813 by Jacki Howell RN  Outcome: Progressing     Problem: Safety - Adult  Goal: Free from fall injury  1/4/2024 0025 by Gloria Ribera RN  Outcome: Progressing  1/3/2024 1813 by Jacki Howell RN  Outcome: Progressing     Problem: Skin/Tissue Integrity  Goal: Absence of new skin breakdown  Description: 1.  Monitor for areas of redness and/or skin breakdown  2.  Assess vascular access sites hourly  3.  Every 4-6 hours minimum:  Change oxygen saturation probe site  4.  Every 4-6 hours:  If on nasal continuous positive airway pressure, respiratory therapy assess nares and determine need for appliance change or resting period.  1/4/2024 0025 by Gloria Ribera RN  Outcome: Progressing  1/3/2024 1813 by Jacki Howell RN  Outcome: Progressing     Problem: Chronic Conditions and Co-morbidities  Goal: Patient's chronic conditions and co-morbidity symptoms are monitored and maintained or improved  1/4/2024 0025 by Gloria Ribera RN  Outcome: Progressing  1/3/2024 1813 by Jacki Howell RN  Outcome: Progressing

## 2024-01-04 NOTE — DIALYSIS
HEMODIALYSIS POST TREATMENT NOTE    Treatment time ordered: 3.0hrs    Actual treatment time: 3.0hrs    UltraFiltration Goal: 2.0kgs  UltraFiltration Removed: 2.0kgs      Pre Treatment weight: 68.0kgs  Post Treatment weight: 66.0kgs  Estimated Dry Weight: 59.0kgs    Access used:     Central Venous Catheter:          Tunneled or Non-tunneled: Tunneled           Site: Right Chest          Access Flow: Good      Internal Access:       AV Fistula or AV Graft: N/A         Site: N/A       Access Flow: N/A       Sign and symptoms of infection: No       If YES: N/A    Medications or blood products given: See MAR    Chronic outpatient schedule: TTS    Chronic outpatient unit: [facility]    Summary of response to treatment: Patient tolerated treatment without issues.  2.0Kgs of fluid removed.  CVC clamped and capped    Explain if orders NOT met, was physician notified:N/A      ACES flowsheet faxed to patient unit/ placed in patient chart: Yes    Post assessment completed: Omaira Kay    Report given to: Jacki Brock Intra-treatment documented Safety Checks include the followin) Access and face visible at all times.     2) All connections and blood lines are secure with no kinks.     3) NVL alarm engaged.     4) Hemosafe device applied (if applicable).     5) No collapse of Arterial or Venous blood chambers.     6) All blood lines / pump segments in the air detectors.

## 2024-01-04 NOTE — PROGRESS NOTES
Physical Therapy        Physical Therapy Cancel Note      DATE: 2024    NAME: Lupe Ojeda  MRN: [id number]   : 1950      Patient not seen this date for Physical Therapy due to:    Surgery/Procedure: Cx; patient getting MRI. Will continue to follow for therpay needs.      Electronically signed by Renetta Singh PTA on 2024 at 4:57 PM

## 2024-01-04 NOTE — CONSULTS
Department of Psychiatry  Behavioral Health Consult    REASON FOR CONSULT: Visual Hallucination    CONSULTING PHYSICIAN: Dr. Lluvia Boles    History obtained from: Daughter    HISTORY OF PRESENT ILLNESS:    The patient is a 73 y.o. female with no significant past psychiatric history and pmh of stroke, CAD status post CABG, hypertension, bilateral cataract, ESRD on HD, CHF, chronic hearing loss, history of right temporal meningioma, cognitive impairment/dementia, afib not on AC due to bleeding risk per cardiology, who is currently admitted (12/31) for altered mentation in setting of UTI and pneumonia.     Psych consulted for worsening hallucination and agitation.     According to daughter, patient has vascular dementia since 4 or 5 months. Daughter is not the caregiver; but her other sister and brother are the main caregivers. They have noticed before coming to the emergency department worsening of mental status. She started addressing people who were not in the room and is remembering events that happened in the past. According to daughter, her mom usually has worsening of mental status when she has urinary infections and this is her third time having similar symptoms of visual hallucination and delusions like her shoes are green and after a while she says she left her shoes at home even when her daughter reassures her that her shoes are in the closet. Patient lately started confusing between the names of her kids. She has also been aggressive towards the nurses and per daughter has not been like that before coming to the hospital.     Patient is poor historian and is addressing names and people who are not in the room.       The patient is not currently receiving care for the above psychiatric illness.      Psychiatric Review of Systems  per Daughter         Obsessions and Compulsions: Denies       Kassandra or Hypomania: Denies     Hallucinations: Visual     Panic Attacks:  Denies     Delusions:  Bizzare     Phobias:   has history of dementia          RISK ASSESSMENT:    No need for bedside sitter      RECOMMENDATIONS  Disposition: per primary team   Risk Management:  fall risk and seizure precautions    Medications:  See orders  Discussed with the treating physician/ team about the patient and treatment plan  Reviewed the chart    Discussed with the patient risk, benefit, alternative and common side effects for the  proposed medication treatment. Patient is consenting to the treatment.    Thanks for the consult. Please call me if needed.    Electronically signed by Jerri Kerr MD on 1/4/2024 at 11:05 AM    Please note that this chart was generated using voice recognition Dragon dictation software.  Although every effort was made to ensure the accuracy of this automated transcription, some errors in transcription may have occurred.   I independently saw and evaluated the patient.  I reviewed the  documentation of the resident.  Any additional comments or changes to the    documentation are stated below otherwise agree with assessment.        -The patient was seen at bedside.  The patient was minimally responsive.  She briefly flickered her eyes and responds to verbal request but did not make any verbal responses.  The patient has received sedating medications (IV lorazepam this morning).  her daughter was present.  The patient has come back from dialysis and is tired.  She has been labile in dialysis.  She has a history of feeling anxious mental dialysis.  I reviewed the patient's medications.  She is taking Vimpat 100 mg twice daily, Seroquel 25 mg nightly and Remeron 15 mg nightly.  The likelihood of benefit from a cholinesterase inhibitor is no in her current mental state.  We will follow  PLAN  Medications as noted above  Attempt to develop insight  Psycho-education conducted.  Supportive Therapy conducted.  Follow-up daily while on inpatient unit    Electronically signed by LESLIE BAUTISTA MD on 1/4/24 at 6:58 PM EST

## 2024-01-04 NOTE — PROGRESS NOTES
right adrenal adenoma.  There is a 7 mm nonobstructing right renal calculus.  No hydronephrosis or significant perinephric stranding. GI/Bowel: Small hiatal hernia.  Scattered colonic diverticula without acute diverticulitis.  No free air.  No dilated loops of bowel or bowel wall thickening within limitations of the exam.  Appendix is normal. Pelvis: The unenhanced uterus and ovaries are unremarkable.  Mild circumferential bladder wall thickening and perivesicular stranding some of which is likely related to underdistention.  Extensive vascular calcifications.  No pathologically enlarged adenopathy or significant free fluid. Peritoneum/Retroperitoneum: The aorta is normal in caliber with severe atherosclerosis.  Stable subcentimeter retroperitoneal lymph nodes.  No obvious pathologically enlarged adenopathy.  No ascites or drainable fluid collection. Bones/Soft Tissues: Mild subcutaneous edema.  Osteopenia.  No acute osseous abnormality.     1. No acute abdominal or pelvic abnormality on this unenhanced exam within the limitations of the exam due to significant motion artifact. 2. Nonobstructing right renal calculus. 3. Gallbladder sludge versus stones. 4. Small bilateral pleural effusions with adjacent atelectasis.     CT Head W/O Contrast    Result Date: 12/31/2023  EXAMINATION: CT OF THE HEAD WITHOUT CONTRAST  12/31/2023 11:26 am TECHNIQUE: CT of the head was performed without the administration of intravenous contrast. Automated exposure control, iterative reconstruction, and/or weight based adjustment of the mA/kV was utilized to reduce the radiation dose to as low as reasonably achievable. COMPARISON: 10/06/2023 HISTORY: ORDERING SYSTEM PROVIDED HISTORY: ams TECHNOLOGIST PROVIDED HISTORY: ams Decision Support Exception - unselect if not a suspected or confirmed emergency medical condition->Emergency Medical Condition (MA) Reason for Exam: Altered Mental Status; Hematuria; Abdominal Pain. pt uncooperative  pathology  -Patient was recently diagnosed with a dementia, she is disoriented today, does not respond to any of my questions.  -Vital signs stable, afebrile  -Continue  Zosyn  -WBC count downtrending 7.3 (13.0)  -Procalcitonin 0.3  -Lactate normalized to 1.7  -UA large hemoglobin large leukocyte esterase, microscopic urinalysis many white count. Urine culture negative, daughter mentioned that patient was on antibiotic before coming to the hospital may be the cause of the negative culture, will repeat urine urinalysis per Nephrology  -Blood cultures pending  -CT head 12/31: no acute intracranial abnormality  -CXR 12/31: diffuse bilateral pulmonary opacities could represent edema or infection   -Respiratory panel, Legionella, strep pneumo negative  -MRI ordered, results awaited  -Neurology on board, disinterest in doing vitamin B1 folate and vitamin B12  -MRI attempted with the help of Ativan, attempt unsuccessful  -EEG was abnormal with mild to moderate encephalopathy, concerning for seizure.    Afib with RVR  -previous history of Afib  -EKG 12/31 showed A-fib with RVR  -Echo 10/25/23 showed EF 40 to 45%, left atrial dilation, tricuspid regurgitation, and mild aortic stenosis and regurgitation  -Continue home dose amiodarone  -Cardio signed out with the recommendation of continuing with aspirin and Plavix  Atrial Fibrillation CHADSVASC2 Score Stroke Risk:   73 y.o. 65-74 - 1   female Female - 1   CHF HX: Yes - 1   HTN HX: Yes - 1   Stroke/TIA/Thromboembolism Yes _2   Vascular Disease HX: Yes - 1   Diabetes Mellitus No - 0   CHADSVASC 2 Score 7      Annual Stroke Risk 11.2% - moderate- high      CAD s/p stent 11/23  -CABG with LIMA-LAD, SVG-OM and SVG-RCA in 2003   -Trop 122>113>83  -EKG 12/31 ST & T wave abnormality, consider lateral ischemia  -Continue home dose aspirin and plavix  -Continue Lipitor 40 mg   -Cardiology on board and following     COPD  -Continue home Spiriva  -DuoNeb neb every 4 hours  -Albuterol  neb every 6 hours prn  -Methylprednisolone 40 mg every 12 hours  -No wheezing on exam today     HFrEF  -Last Echo 10/23 showed EF 40-45%  -Continue home dose metoprolol 25 mg  -Lasix 80 mg not on dialysis days   -Daily I's/O's     Hypothyroidism   -Continue home dose levothyroxine 25 mcg  -Repeat TSH 2.73     ESRD on TTS dialysis  -Nephro on board  -Creatinine 4.3  -BUN 34     Seizures 2/2 right temporal extra-axial mass(meningioma)  -Continue home dose Lamotrigine      Cellulitis/wound  -Bilateral lower extremity cellulitis and wound  -Wounds on her lower back  -Wound ostomy eval and treat     DVT prophylaxis: EPC cuffs  GI prophylaxis: Continue home dose Pepcid 20 mg  Diet: Regular low sodium low potassium low phosphorus 1500 mL fluid restriction  Dispo: tbd      OT/PT/SW Consulted        Plan will be discussed with the attending, Dr Eh Wei MD  TY Resident  1/4/2024 10:03 AM     Attending Physician Statement  I have discussed the care of Lupe MARTINS Rusty and I have examined the patient myself and taken ROS and HPI, including pertinent history and exam findings, with the resident. I have reviewed the key elements of all parts of the encounter with the resident.  I agree with the assessment, plan and orders as documented by the resident.    Patient seen and examined  Delirium superimposed on dementia  Continues to have hallucinations  Neurology consulted  EEG abnormal, discussed with neurology, to adjust the dose of Lamictal, since cannot have MRI with contrast, will do CT with contrast, will coordinate with dialysis  UA culture is negative, patient was treated with antibiotics before the hospitalization, already received 5 days of antibiotics, chest x-ray negative for pneumonia, doubt infectious process  Steroid-induced psychosis?  Steroids is DC'd  Started on Seroquel which seemed to help  Discussed with daughter present at the bedside    Electronically signed by Claudia Stauffer MD

## 2024-01-05 VITALS
SYSTOLIC BLOOD PRESSURE: 143 MMHG | BODY MASS INDEX: 29.33 KG/M2 | OXYGEN SATURATION: 100 % | WEIGHT: 145.5 LBS | HEIGHT: 59 IN | RESPIRATION RATE: 16 BRPM | DIASTOLIC BLOOD PRESSURE: 54 MMHG | TEMPERATURE: 98.8 F | HEART RATE: 113 BPM

## 2024-01-05 LAB
ANION GAP SERPL CALCULATED.3IONS-SCNC: 12 MMOL/L (ref 9–17)
BASOPHILS # BLD: 0 K/UL (ref 0–0.2)
BASOPHILS NFR BLD: 0 % (ref 0–2)
BUN SERPL-MCNC: 17 MG/DL (ref 8–23)
CALCIUM SERPL-MCNC: 8 MG/DL (ref 8.6–10.4)
CHLORIDE SERPL-SCNC: 98 MMOL/L (ref 98–107)
CO2 SERPL-SCNC: 26 MMOL/L (ref 20–31)
CREAT SERPL-MCNC: 3 MG/DL (ref 0.5–0.9)
EOSINOPHIL # BLD: 0.1 K/UL (ref 0–0.4)
EOSINOPHILS RELATIVE PERCENT: 2 % (ref 0–4)
ERYTHROCYTE [DISTWIDTH] IN BLOOD BY AUTOMATED COUNT: 17.9 % (ref 11.5–14.9)
GFR SERPL CREATININE-BSD FRML MDRD: 16 ML/MIN/1.73M2
GLUCOSE BLD-MCNC: 110 MG/DL (ref 65–105)
GLUCOSE BLD-MCNC: 186 MG/DL (ref 65–105)
GLUCOSE SERPL-MCNC: 82 MG/DL (ref 70–99)
HCT VFR BLD AUTO: 41 % (ref 36–46)
HGB BLD-MCNC: 12.8 G/DL (ref 12–16)
LYMPHOCYTES NFR BLD: 1.2 K/UL (ref 1–4.8)
LYMPHOCYTES RELATIVE PERCENT: 18 % (ref 24–44)
MAGNESIUM SERPL-MCNC: 2 MG/DL (ref 1.6–2.6)
MCH RBC QN AUTO: 29.5 PG (ref 26–34)
MCHC RBC AUTO-ENTMCNC: 31.2 G/DL (ref 31–37)
MCV RBC AUTO: 94.6 FL (ref 80–100)
MICROORGANISM SPEC CULT: NORMAL
MICROORGANISM SPEC CULT: NORMAL
MONOCYTES NFR BLD: 0.7 K/UL (ref 0.1–1.3)
MONOCYTES NFR BLD: 11 % (ref 1–7)
NEUTROPHILS NFR BLD: 69 % (ref 36–66)
NEUTS SEG NFR BLD: 4.6 K/UL (ref 1.3–9.1)
PLATELET # BLD AUTO: 145 K/UL (ref 150–450)
PMV BLD AUTO: 7.7 FL (ref 6–12)
POTASSIUM SERPL-SCNC: 3.5 MMOL/L (ref 3.7–5.3)
RBC # BLD AUTO: 4.34 M/UL (ref 4–5.2)
SERVICE CMNT-IMP: NORMAL
SERVICE CMNT-IMP: NORMAL
SODIUM SERPL-SCNC: 136 MMOL/L (ref 135–144)
SPECIMEN DESCRIPTION: NORMAL
SPECIMEN DESCRIPTION: NORMAL
WBC OTHER # BLD: 6.6 K/UL (ref 3.5–11)

## 2024-01-05 PROCEDURE — 83735 ASSAY OF MAGNESIUM: CPT

## 2024-01-05 PROCEDURE — 94761 N-INVAS EAR/PLS OXIMETRY MLT: CPT

## 2024-01-05 PROCEDURE — 99232 SBSQ HOSP IP/OBS MODERATE 35: CPT | Performed by: PSYCHIATRY & NEUROLOGY

## 2024-01-05 PROCEDURE — 6370000000 HC RX 637 (ALT 250 FOR IP)

## 2024-01-05 PROCEDURE — 85025 COMPLETE CBC W/AUTO DIFF WBC: CPT

## 2024-01-05 PROCEDURE — 99239 HOSP IP/OBS DSCHRG MGMT >30: CPT | Performed by: INTERNAL MEDICINE

## 2024-01-05 PROCEDURE — 6370000000 HC RX 637 (ALT 250 FOR IP): Performed by: PSYCHIATRY & NEUROLOGY

## 2024-01-05 PROCEDURE — 97530 THERAPEUTIC ACTIVITIES: CPT

## 2024-01-05 PROCEDURE — 5A1D80Z PERFORMANCE OF URINARY FILTRATION, PROLONGED INTERMITTENT, 6-18 HOURS PER DAY: ICD-10-PCS | Performed by: INTERNAL MEDICINE

## 2024-01-05 PROCEDURE — 2580000003 HC RX 258

## 2024-01-05 PROCEDURE — 94640 AIRWAY INHALATION TREATMENT: CPT

## 2024-01-05 PROCEDURE — 97535 SELF CARE MNGMENT TRAINING: CPT

## 2024-01-05 PROCEDURE — 80048 BASIC METABOLIC PNL TOTAL CA: CPT

## 2024-01-05 PROCEDURE — 6360000002 HC RX W HCPCS

## 2024-01-05 PROCEDURE — 97110 THERAPEUTIC EXERCISES: CPT

## 2024-01-05 PROCEDURE — 82947 ASSAY GLUCOSE BLOOD QUANT: CPT

## 2024-01-05 PROCEDURE — 36415 COLL VENOUS BLD VENIPUNCTURE: CPT

## 2024-01-05 RX ORDER — POTASSIUM CHLORIDE 20 MEQ/1
40 TABLET, EXTENDED RELEASE ORAL ONCE
Status: DISCONTINUED | OUTPATIENT
Start: 2024-01-05 | End: 2024-01-05 | Stop reason: HOSPADM

## 2024-01-05 RX ORDER — LACOSAMIDE 100 MG/1
100 TABLET ORAL 2 TIMES DAILY
Qty: 60 TABLET | Refills: 1 | Status: SHIPPED | OUTPATIENT
Start: 2024-01-05 | End: 2024-03-05

## 2024-01-05 RX ORDER — LACOSAMIDE 100 MG/1
100 TABLET ORAL 2 TIMES DAILY
Qty: 60 TABLET | Refills: 2 | Status: SHIPPED | OUTPATIENT
Start: 2024-01-05 | End: 2024-01-05

## 2024-01-05 RX ORDER — QUETIAPINE FUMARATE 25 MG/1
25 TABLET, FILM COATED ORAL NIGHTLY
Qty: 60 TABLET | Refills: 3 | Status: SHIPPED | OUTPATIENT
Start: 2024-01-05 | End: 2024-01-05

## 2024-01-05 RX ORDER — QUETIAPINE FUMARATE 25 MG/1
25 TABLET, FILM COATED ORAL NIGHTLY
Qty: 60 TABLET | Refills: 3 | Status: SHIPPED | OUTPATIENT
Start: 2024-01-05

## 2024-01-05 RX ADMIN — FAMOTIDINE 10 MG: 20 TABLET, FILM COATED ORAL at 05:33

## 2024-01-05 RX ADMIN — CLOPIDOGREL BISULFATE 75 MG: 75 TABLET ORAL at 09:36

## 2024-01-05 RX ADMIN — LACOSAMIDE 100 MG: 100 TABLET, FILM COATED ORAL at 09:36

## 2024-01-05 RX ADMIN — IPRATROPIUM BROMIDE AND ALBUTEROL SULFATE 1 DOSE: 2.5; .5 SOLUTION RESPIRATORY (INHALATION) at 12:11

## 2024-01-05 RX ADMIN — IPRATROPIUM BROMIDE AND ALBUTEROL SULFATE 1 DOSE: 2.5; .5 SOLUTION RESPIRATORY (INHALATION) at 15:37

## 2024-01-05 RX ADMIN — AMIODARONE HYDROCHLORIDE 100 MG: 200 TABLET ORAL at 09:36

## 2024-01-05 RX ADMIN — PIPERACILLIN AND TAZOBACTAM 3375 MG: 3; .375 INJECTION, POWDER, LYOPHILIZED, FOR SOLUTION INTRAVENOUS at 05:35

## 2024-01-05 RX ADMIN — LEVOTHYROXINE SODIUM 25 MCG: 0.03 TABLET ORAL at 05:33

## 2024-01-05 RX ADMIN — ASPIRIN 81 MG: 81 TABLET, COATED ORAL at 09:36

## 2024-01-05 RX ADMIN — SODIUM CHLORIDE, PRESERVATIVE FREE 10 ML: 5 INJECTION INTRAVENOUS at 09:37

## 2024-01-05 RX ADMIN — IPRATROPIUM BROMIDE AND ALBUTEROL SULFATE 1 DOSE: 2.5; .5 SOLUTION RESPIRATORY (INHALATION) at 07:53

## 2024-01-05 ASSESSMENT — ENCOUNTER SYMPTOMS
APNEA: 0
COUGH: 0
CHOKING: 0
WHEEZING: 0
SHORTNESS OF BREATH: 0

## 2024-01-05 NOTE — PROGRESS NOTES
[facility]   INPATIENT OCCUPATIONAL THERAPY  PROGRESS NOTE  Date: 2024  Patient Name: Lupe Ojeda       Room:   MRN: [id number]    : 1950  (73 y.o.)  Gender: female   Referring Practitioner: Warren Negrete MD  Diagnosis: Sepsis due to UTI      Discharge Recommendations:  Further Occupational Therapy is recommended upon facility discharge.    OT Equipment Recommendations  Other: TBD    Restrictions/Precautions  Restrictions/Precautions  Restrictions/Precautions: Fall Risk;Contact Precautions  Required Braces or Orthoses?: Yes (R knee brace prn)  Implants present? :  (Pt denies)    BP Location: Left upper arm  O2 Device: None (Room air)    Subjective  Subjective  Subjective: \"I'm ready to get washed up, dressed, sit up, and cry. I'm scared to death of falling. Do you have any chips?\" Pt req max v/c to attend to tasks throughout tx.  Subjective  Subjective: Cx; patient getting MRI. Will continue to follow for therpay needs.  Pain: Pt denies pain  Comments: SHERITA Cottrell gave okay for co-tx PTA PJ    Objective  Orientation  Overall Orientation Status: Impaired  Orientation Level: Disoriented X4  Cognition  Overall Cognitive Status: Exceptions  Arousal/Alertness: Delayed responses to stimuli;Inconsistent responses to stimuli  Following Commands: Follows one step commands with increased time;Follows one step commands with repetition;Inconsistently follows commands  Attention Span: Attends with cues to redirect  Memory: Decreased short term memory;Decreased recall of recent events  Safety Judgement: Decreased awareness of need for safety;Decreased awareness of need for assistance  Problem Solving: Assistance required to correct errors made;Assistance required to identify errors made;Assistance required to implement solutions;Assistance required to generate solutions;Decreased awareness of errors  Insights: Not aware of deficits  Initiation: Requires cues for all  Sequencing:  body dresssing/bathing with Mod A and Fair safety with use of AE as needed  Short Term Goal 2: Pt will complete functional transfers with Mod A and Fair safety with use of least restrictive device  Short Term Goal 3: Pt will tolerate standing 4+ minutes during functional activity of choice with Min A and Fair safety  Short Term Goal 4: Pt will attend to task with no more than 2 verbal cues for redirection to increase safety and participation in daily activities  Short Term Goal 5: Pt will participate in 15+ minutes of therapeutic exercises/functional activities to increase safety and independence with self care and mobility  Occupational Therapy Plan  Times Per Week: 5-7  Current Treatment Recommendations: Self-Care / ADL, Strengthening, Balance training, Functional mobility training, Endurance training, Patient/Caregiver education & training, Equipment evaluation, education, & procurement, Pain management, Safety education & training, Co-Treatment    Assessment  Activity Tolerance  Activity Tolerance: Treatment limited secondary to decreased cognition, Treatment limited secondary to agitation  Assessment  Performance deficits / Impairments: Decreased ADL status, Decreased functional mobility , Decreased strength, Decreased safe awareness, Decreased cognition, Decreased endurance, Decreased balance, Decreased vision/visual deficit, Decreased high-level IADLs, Decreased fine motor control, Decreased posture  Treatment Diagnosis: Impaired self care status  Prognosis: Fair  Decision Making: Medium Complexity  Discharge Recommendations: Patient would benefit from continued therapy after discharge  OT Equipment Recommendations  Other: TBD  Safety Devices  Type of Devices: All fall risk precautions in place, Call light within reach, Gait belt, Patient at risk for falls, Nurse notified, Chair alarm in place, Left in chair (daughter present)    AM-PAC Daily Activities Inpatient  AM-PAC Daily Activity - Inpatient   How much

## 2024-01-05 NOTE — PROGRESS NOTES
BEHAVIORAL HEALTH FOLLOW-UP NOTE     1/5/2024     Patient was seen and examined in person, Chart reviewed   Patient's case discussed with staff/team    Patient seen and examined at bedside.  According to daughter patient has lateral after getting her Atarax yesterday.  Patient ate breakfast that her daughter brought.  Her daughter stated that she feels that the patient is back to baseline.  She still addresses people that are not around in the room.  Patient is calm and not agitated  Patient was able to name the hospital, her name, year, and mentioned that it is December.    Chief Complaint: Hallucination    Interim History:     The patient is a 73 y.o. female with no significant past psychiatric history and pmh of stroke, CAD status post CABG, hypertension, bilateral cataract, ESRD on HD, CHF, chronic hearing loss, history of right temporal meningioma, cognitive impairment/dementia, afib not on AC due to bleeding risk per cardiology, who is currently admitted (12/31) for altered mentation in setting of UTI and pneumonia.      Psych consulted for worsening hallucination and agitation.      According to daughter, patient has vascular dementia since 4 or 5 months. Daughter is not the caregiver; but her other sister and brother are the main caregivers. They have noticed before coming to the emergency department worsening of mental status. She started addressing people who were not in the room and is remembering events that happened in the past. According to daughter, her mom usually has worsening of mental status when she has urinary infections and this is her third time having similar symptoms of visual hallucination and delusions like her shoes are green and after a while she says she left her shoes at home even when her daughter reassures her that her shoes are in the closet. Patient lately started confusing between the names of her kids. She has also been aggressive towards the nurses and per daughter has not been  recognition software. It may contain minor errors which are inherent in voice recognition technology.**   I independently saw and evaluated the patient.  I reviewed the  documentation by the CNP    .  Any additional comments or changes to the   documentation are stated below otherwise agree with assessment.      The patient was seen at bedside.  She has been perplexed and confused.  She is very hard of hearing and difficult to assess.  She told me that the year was 2004.  She also believes that she has been visited by her father though she acknowledges that she has not seen her father since 2005.  The patient daughter was present at bedside and said that she is typically tired after the daughter says and has been confused for the last many months at least.  At this time no changes have been made to her medications.      PLAN  Medications as noted above  Attempt to develop insight  Psycho-education conducted.  Supportive Therapy conducted.  Follow-up daily while on inpatient unit    Electronically signed by LESLIE BAUTISTA MD on 1/5/24 at 5:26 PM EST

## 2024-01-05 NOTE — PROGRESS NOTES
[facility]  IN-PATIENT SERVICE  [facility]    PROGRESS NOTE             1/5/2024    8:05 AM    Name:   Lupe Ojeda  MRN:     [id number]     Acct:      [id number]   Room:   2125/2125-01   Day:  5  Admit Date:  12/31/2023  9:11 AM    PCP:  Kayleigh Cardoso MD  Code Status:  Full Code    Subjective:     C/C:   Chief Complaint   Patient presents with    Altered Mental Status    Hematuria    Abdominal Pain     Interval History Status: improved.    improved.  Patient seen and examined on the bedside, she was sitting comfortably in the bed, still having to have medial hallucinations.  Daughter present at the bedside, discussed with the daughter in detail her baseline in terms of her mentation.  Pt denies any pain, any SOB, she had a BM yesterday.  Daughter mentioned she keeps having hallucinations at home and she is almost needed to her baseline, with concern that his she needs 3 people assistance to get to the restroom which might be hindrance in taking care of her at home.  Discussed options of any other facility versus home with her.  She prefers home.  /PT/OT on board.      Brief History:     73 year old female with COPD, HTN, bilateral cataract, ESRD on dialysis TTS, HFrEF, CVA in 2020, CAD s/p stent 11/23, venous insufficiency bilateral LE, and Afib who presented to the ED due to altered mental status and hematuria. Patient is a poor historian due to dementia.      Family reports that patient is a dialysis patient but does make a small amount of urine reportedly had small amount of hematuria over the last couple days and was more altered than baseline.  Family reports that she does call out to family members but has been more confused.      The patient had chest x-ray which showed diffuse bilateral pulmonary opacities concerning for edema or infection.  Urinalysis showed does have leukocyte esterase, hematuria and WBC's.  Was given Zosyn and  normal gait and station , no tenderness or deformities present    Delirium due to another medical condition [F05] 05/26/2021    Atherosclerosis of coronary artery bypass graft of native heart without angina pectoris [I25.810]        Plan:        Altered Mental Status 2/2 pneumonia versus neurological pathology  -Patient was recently diagnosed with a dementia, she is disoriented today, does not respond to any of my questions.  -Vital signs stable, afebrile  -Continue  Zosyn  -WBC count downtrending 7.3 (13.0)  -Procalcitonin 0.3  -Lactate normalized to 1.7  -UA large hemoglobin large leukocyte esterase, microscopic urinalysis many white count. Urine culture negative, daughter mentioned that patient was on antibiotic before coming to the hospital may be the cause of the negative culture, will repeat urine urinalysis per Nephrology  -Blood cultures pending  -CT head 12/31: no acute intracranial abnormality  -CXR 12/31: diffuse bilateral pulmonary opacities could represent edema or infection   -Respiratory panel, Legionella, strep pneumo negative  -MRI ordered, results awaited  -Neurology on board, disinterest in doing vitamin B1 folate and vitamin B12  -MRI attempted with the help of Ativan, attempt unsuccessful  -EEG was abnormal with mild to moderate encephalopathy, concerning for seizure.     Afib with RVR  -previous history of Afib  -EKG 12/31 showed A-fib with RVR  -Echo 10/25/23 showed EF 40 to 45%, left atrial dilation, tricuspid regurgitation, and mild aortic stenosis and regurgitation  -Continue home dose amiodarone  -Cardio signed out with the recommendation of continuing with aspirin and Plavix  Atrial Fibrillation CHADSVASC2 Score Stroke Risk:   73 y.o. 65-74 - 1   female Female - 1   CHF HX: Yes - 1   HTN HX: Yes - 1   Stroke/TIA/Thromboembolism Yes _2   Vascular Disease HX: Yes - 1   Diabetes Mellitus No - 0   CHADSVASC 2 Score 7      Annual Stroke Risk 11.2% - moderate- high      CAD s/p stent 11/23  -CABG with LIMA-LAD, SVG-OM and SVG-RCA in 2003   -Trop

## 2024-01-05 NOTE — PROGRESS NOTES
CNP notified of K level and No PRN orders. Advised to refer to Nephro team to manage when rounding on pt today. Will pass on to oncoming nurse.

## 2024-01-05 NOTE — CARE COORDINATION
Transportation paperwork faxed to [facility]  No transport available tonight, looking for Transport tomorrow.     Electronically signed by Vicky Gonzalez RN on 1/5/2024 at 5:00 PM

## 2024-01-05 NOTE — PROGRESS NOTES
Physical Therapy  Facility/Department: [facility] PROGRESSIVE CARE  Daily Treatment Note  NAME: Lupe Ojeda  : 1950  MRN: [id number]    Date of Service: 2024    Discharge Recommendations:  Patient would benefit from continued therapy after discharge, Therapy recommended at discharge     Patient Diagnosis(es): The primary encounter diagnosis was Altered mental status, unspecified altered mental status type. Diagnoses of Acute cystitis with hematuria and Sepsis with encephalopathy without septic shock, due to unspecified organism (HCC) were also pertinent to this visit.    Assessment   Activity Tolerance: Treatment limited secondary to decreased cognition     Restrictions  Restrictions/Precautions  Restrictions/Precautions: Fall Risk, Contact Precautions  Required Braces or Orthoses?: Yes (R knee brace prn)  Implants present? :  (Pt denies)     Subjective    Subjective  Subjective: Patient resting in bed with daughter Kiya and  present discussing patient care and discharge planning. Patient agreeable to PT/OT co-treat with RHODA Harris after some encouragement due to patient reporting a fear of falling and/or injury, patient went on to state she also has a fear of someone hurting her because she can not see and does not hear well.  Pain: Denies pain.  Orientation  Overall Orientation Status: Impaired  Orientation Level: Disoriented X4  Cognition  Overall Cognitive Status: Exceptions  Arousal/Alertness: Delayed responses to stimuli;Inconsistent responses to stimuli  Following Commands: Follows one step commands with increased time;Follows one step commands with repetition;Inconsistently follows commands  Attention Span: Attends with cues to redirect  Memory: Decreased short term memory;Decreased recall of recent events  Safety Judgement: Decreased awareness of need for safety;Decreased awareness of need for assistance  Problem Solving: Assistance required to correct errors made;Assistance required to identify

## 2024-01-05 NOTE — PROGRESS NOTES
Discharge meds resent by Dr. Marshall to [facility] [phone] per pt daughter request - [facility] unable to bill pt's insurance for medications 1/5/24 3:38pm Prog Nurse Simba also aware

## 2024-01-05 NOTE — PROGRESS NOTES
Nephrology ESRD Progress Note    Reason for Consult:  Management of hemodialysis dependent End stage renal disease    Requesting Physician: Claudia Stauffer MD    Interval History:   Patient was seen and examined today she remains generally still confused but much less so.  She was having visual hallucinations during this my encounter with daughter present.   She tolerated hemodialysis well yesterday with removal of 2 kg of fluid and a post dialysis weight 66 kg current outpatient target weight is 59 kg.    History of Present Illness:  This is a 73 y.o. female with a significant past medical history of Cerebrovascular accident, atrial fibrillation status post CABG and recent PCI to left circumflex November 2023,  combined diastolic and systolic heart failure, essential hypertension, partial deafness and  ESRD on hemodialysis Tuesday Thursday Saturday at [facility] dialysis unit under Dr Novak, who presents to the ER yesterday with confusion, altered mental status and gross hematuria.  Family reported small amount of hematuria passed by the patient last few days -no fever or flank pain observed.Urinalysis showed leukocyte Estrace, hematuria and WBCs but urine culture is negative.  Chest x-ray showed evidence of diffuse bilateral pulmonary opacities concerning for edema infection and a CT abdomen pelvis showed nonobstructive right renal calculi without hydronephrosis or stranding.  She is empirically started on antibiotics for possible pneumonia.  Laboratory studies showed a WBC of 17, hemoglobin 13.1 with BUN/creatinine of 18 and 2.9 and potassium 4.7.  Troponin was 113.  Patient is seen and examined during dialysis-patient  is confused not oriented to place or time or name.  She has a tacky- arrhythmia up to the 140s.  No chest pain.    Current Medications:    potassium chloride (KLOR-CON M) extended release tablet 40 mEq, Once  sodium chloride flush 0.9 % injection 10 mL, PRN  lacosamide (VIMPAT) tablet 100 mg,  BID  QUEtiapine (SEROQUEL) tablet 25 mg, Nightly  famotidine (PEPCID) tablet 10 mg, QAM AC  albuterol (PROVENTIL) (2.5 MG/3ML) 0.083% nebulizer solution 2.5 mg, Q6H PRN  atorvastatin (LIPITOR) tablet 40 mg, QPM  amiodarone (CORDARONE) tablet 100 mg, QAM  aspirin EC tablet 81 mg, Daily  clopidogrel (PLAVIX) tablet 75 mg, Daily  hydrOXYzine HCl (ATARAX) tablet 50 mg, Daily PRN  levothyroxine (SYNTHROID) tablet 25 mcg, QAM AC  metoprolol succinate (TOPROL XL) extended release tablet 25 mg, QPM  mirtazapine (REMERON) tablet 15 mg, Nightly  ipratropium 0.5 mg-albuterol 2.5 mg (DUONEB) nebulizer solution 1 Dose, Q4H WA RT  sodium chloride flush 0.9 % injection 5-40 mL, 2 times per day  sodium chloride flush 0.9 % injection 5-40 mL, PRN  0.9 % sodium chloride infusion, PRN  ondansetron (ZOFRAN-ODT) disintegrating tablet 4 mg, Q8H PRN   Or  ondansetron (ZOFRAN) injection 4 mg, Q6H PRN  polyethylene glycol (GLYCOLAX) packet 17 g, Daily PRN  acetaminophen (TYLENOL) tablet 650 mg, Q6H PRN   Or  acetaminophen (TYLENOL) suppository 650 mg, Q6H PRN  furosemide (LASIX) tablet 80 mg, Every Other Day      Objective:  Constitutional:    CURRENT TEMPERATURE:  Temp: 98.2 °F (36.8 °C)  MAXIMUM TEMPERATURE OVER 24HRS:  Temp (24hrs), Av.7 °F (36.5 °C), Min:97.1 °F (36.2 °C), Max:98.2 °F (36.8 °C)    CURRENT RESPIRATORY RATE:  Respirations: 16  CURRENT PULSE:  Pulse: 98  CURRENT BLOOD PRESSURE:  BP: (!) 147/69  24HR BLOOD PRESSURE RANGE:  Systolic (24hrs), Av , Min:117 , Max:150   ; Diastolic (24hrs), Av, Min:46, Max:91    24HR INTAKE/OUTPUT:  No intake or output data in the 24 hours ending 24 1416  Physical Exam:  GENERAL APPEARANCE: Alert but confused, not oriented to time person or place and appears to be in no acute distress.  HEAD: normocephalic  EYES: PERRL, EOMI. Not pale, anicteric   NOSE:  No nasal discharge.    THROAT:  Oral cavity and pharynx normal. Moist  NECK: Neck supple, non-tender without

## 2024-01-05 NOTE — DISCHARGE SUMMARY
[facility]   IN-PATIENT SERVICE   [facility]    Discharge Summary     Patient ID: Lupe Ojeda  :  1950   MRN: [id number]     ACCOUNT:  [id number]   Patient's PCP: Kayleigh Cardoso MD  Admit Date: 2023   Discharge Date: 2024     Length of Stay: 5  Code Status:  Full Code  Admitting Physician: Claudia Stauffer MD  Discharge Physician: Lluvia Boles MD     Active Discharge Diagnoses:       Primary Problem  Sepsis due to urinary tract infection (HCC)      Hospital Problems  Active Hospital Problems    Diagnosis Date Noted    Renovascular hypertension [I15.0] 2024     Priority: High    H/O heart artery stent [Z95.5] 2023     Priority: High    Elevated troponin [R79.89] 06/15/2022     Priority: Medium    Goals of care, counseling/discussion [Z71.89] 2024    DNR (do not resuscitate) discussion [Z71.89] 2024    ACP (advance care planning) [Z71.89] 2024    Palliative care encounter [Z51.5] 2024    Acute cystitis with hematuria [N30.01] 2024    Sepsis due to urinary tract infection (HCC) [A41.9, N39.0] 2023    Acute encephalopathy [G93.40] 2023    Altered mental status [R41.82] 2021    Delirium due to another medical condition [F05] 2021    Atherosclerosis of coronary artery bypass graft of native heart without angina pectoris [I25.810]        Admission Condition:  poor     Discharged Condition: fair    Hospital Stay:       Hospital Course:  Lupe Ojeda is a 73 y.o. female who was admitted for the management of   Sepsis due to urinary tract infection (HCC) , presented to ER with Altered Mental Status, Hematuria, and Abdominal Pain, it was suspected that the AMS is due to an infection likely UTI/pneumonia, urinalysis today with urine culture was negative all the workup for the pneumonia was negative except opacities and CT scan, patient was treated with empiric antibiotic Zosyn for 5 days.  For her  central venous catheter remains in place.  Cardiomegaly.  Diffuse bilateral pulmonary opacities.  No pneumothorax.  No focal pulmonary consolidation.  Bilateral pleural effusions.     Diffuse bilateral pulmonary opacities could represent edema or infection     CT Head W/O Contrast    Result Date: 12/31/2023  EXAMINATION: CT OF THE HEAD WITHOUT CONTRAST  12/31/2023 11:26 am TECHNIQUE: CT of the head was performed without the administration of intravenous contrast. Automated exposure control, iterative reconstruction, and/or weight based adjustment of the mA/kV was utilized to reduce the radiation dose to as low as reasonably achievable. COMPARISON: 10/06/2023 HISTORY: ORDERING SYSTEM PROVIDED HISTORY: ams TECHNOLOGIST PROVIDED HISTORY: ams Decision Support Exception - unselect if not a suspected or confirmed emergency medical condition->Emergency Medical Condition (MA) Reason for Exam: Altered Mental Status; Hematuria; Abdominal Pain. pt uncooperative unable to hold still for images. 2 sets of images sent due to patient motion FINDINGS: Exam is moderately degraded by motion. BRAIN/VENTRICLES: There is no acute intracranial hemorrhage, mass effect or midline shift.  No abnormal extra-axial fluid collection.  Similar right temporal encephalomalacia.  There is no evidence of hydrocephalus. ORBITS: The visualized portion of the orbits demonstrate no acute abnormality. SINUSES: The visualized paranasal sinuses and mastoid air cells demonstrate no acute abnormality. SOFT TISSUES/SKULL:  No acute abnormality of the visualized skull or soft tissues.     No acute intracranial abnormality.         Consultations:    Consults:     Final Specialist Recommendations/Findings:   IP CONSULT TO INTERNAL MEDICINE  IP CONSULT TO NEPHROLOGY  IP CONSULT TO SOCIAL WORK  IP CONSULT TO CARDIOLOGY  IP CONSULT TO SPIRITUAL SERVICES  IP CONSULT TO UROLOGY  IP CONSULT TO NEUROLOGY  IP CONSULT TO PSYCHIATRY  IP CONSULT TO PALLIATIVE CARE      The    polyethylene glycol 17 GM/SCOOP powder  Commonly known as: MiraLax  Take 17 g by mouth daily     prednisoLONE acetate 1 % ophthalmic suspension  Commonly known as: PRED FORTE     tiotropium 2.5 MCG/ACT Aers inhaler  Commonly known as: SPIRIVA RESPIMAT  Inhale 2 puffs into the lungs every morning (before breakfast)     ZeniFOAM GENTLE 9\"x9\"/Sacral Pads  Apply 1 each topically daily           * This list has 5 medication(s) that are the same as other medications prescribed for you. Read the directions carefully, and ask your doctor or other care provider to review them with you.                STOP taking these medications      acyclovir 5 % ointment  Commonly known as: Zovirax     amLODIPine 10 MG tablet  Commonly known as: NORVASC               Where to Get Your Medications        These medications were sent to [facility] [address] [place] [address] -  [phone] - F [phone]  [address] [place] [id number]      Phone: [phone]   lacosamide 100 MG Tabs tablet  QUEtiapine 25 MG tablet         Time Spent on discharge is  20 mins in patient examination, evaluation, counseling as well as medication reconciliation, prescriptions for required medications, discharge plan and follow up.    Electronically signed by   Lluvia Boles MD  1/5/2024  5:19 PM      Thank you Kayleigh Pittman MD for the opportunity to be involved in this patient's care.

## 2024-01-05 NOTE — DISCHARGE INSTRUCTIONS
-Follow up with Neurology in 2-3 weeks  -follow up with ophthalmology and Neurosurgery outpatient  -follow up with PCP in 7 days.  -take all your newly started medication and stop taking lamotrigine.

## 2024-01-05 NOTE — PROGRESS NOTES
01/04/24 2003   Encounter Summary   Encounter Overview/Reason  Spiritual/Emotional Needs   Service Provided For: Patient   Referral/Consult From: Palliative Care   Complexity of Encounter Low   Spiritual/Emotional needs   Type Spiritual Support   Palliative Care   Type Palliative Care, Follow-up   Assessment/Intervention/Outcome   Assessment Unable to assess   Intervention Prayer (assurance of)/[place]

## 2024-01-05 NOTE — PROGRESS NOTES
01/05/24 1608   Encounter Summary   Encounter Overview/Reason  Attempted Encounter   Service Provided For: Patient not available  (patient with PCT)

## 2024-01-05 NOTE — PROGRESS NOTES
[facility] Neurology   IN-PATIENT SERVICE      NEUROLOGY PROGRESS  NOTE            Date:   1/5/2024  Patient name:  Lupe Ojeda  Date of admission:  12/31/2023  YOB: 1950      Interval History:     1/5: Appears more alert today, less hallucinations.  Per daughter, her mentation has improved from time of admission and she is almost at baseline.  Again, she has hx history of cognitive impairment/hallucinations.  No new neurological symptoms.    1/4: Per daughter, mentation overall improved.  EEG was done yesterday.  MRI was again attempted today, unsuccessful.  Subsequently CT brain with and without contrast was obtained.  She was given Ativan for MRI and is lethargic on my assessment today.    1/3: No acute events.  Continued waxing and waning mentation.  Overall however, for me, she appears improved, is less hyperactive, and more directable.  Started on seroquel overnight. MRI brain was attempted with Ativan, she became agitated and could not tolerate.  Daughter at bedside.    History of Present Illness:     73-year-old female with past medical history of stroke, CAD status post CABG, hypertension, bilateral cataract, ESRD on HD, CHF, chronic hearing loss, history of right temporal meningioma, cognitive impairment/dementia, afib not on AC due to bleeding risk per cardiology, who is currently admitted for altered mentation in setting of UTI and pneumonia.  Neurology consulted for altered mentation and history of meningioma.  History limited given patient's mentation.  Obtained primarily from daughter at bedside and chart review.  Daughter is a poor historian.     Patient has been admitted since 12/31/2023.  She initially presented as family reported noticing hematuria and increased confusion.  She was found to be in A-fib RVR, had a UTI and pneumonia, started on antibiotics.  Per daughter, her mentation has not significantly improved.  She continues to be hyperactive, hallucinating, having  orders placed during the hospital encounter of 08/16/23    MRI BRAIN WO CONTRAST    Narrative  EXAMINATION:  MRI OF THE BRAIN WITHOUT CONTRAST  8/16/2023 10:16 pm    TECHNIQUE:  Multiplanar multisequence MRI of the brain was performed without the  administration of intravenous contrast.    COMPARISON:  CT head from today    HISTORY:  ORDERING SYSTEM PROVIDED HISTORY: thin cut T2 axial (STEALTH protocol)  TECHNOLOGIST PROVIDED HISTORY:  thin cut T2 axial (STEALTH protocol)  What is the sedation requirement?->None  Reason for Exam: right temporal lobe mass    FINDINGS:  INTRACRANIAL STRUCTURES/VENTRICLES: There is no acute infarct. No mass effect  or midline shift. No evidence of an acute intracranial hemorrhage.  The  ventricles and sulci are prominent in size and configuration.  The  sellar/suprasellar regions appear unremarkable.  The normal signal voids  within the major intracranial vessels appear maintained.  Moderate  periventricular T2 lengthening.    Isointense extra-axial mass anterior to the frontal pole of the right  temporal lobe measures 2.4 x 2.7 cm in AP and transverse dimensions.  It  causes deep white matter edema.  There is no midline shift or herniation.    ORBITS: The visualized portion of the orbits demonstrate no acute abnormality.    SINUSES: The visualized paranasal sinuses and mastoid air cells demonstrate  no acute abnormality.    BONES/SOFT TISSUES: The bone marrow signal intensity appears normal. The soft  tissues demonstrate no acute abnormality.    Impression  Right temporal mass may represent a meningioma.  Further characterization  with IV contrast recommended.  Local edema with no evidence of herniation or  midline shift at this time.      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 She has had prior multiple similar presentations in setting of infection. On the other hand, also has stroke risk factors including afib. She has history of right temporal meningioma although clinically, unlikely to be contributing to her current mentation in and of iteself. MRI attempted multiple times but patient unable to obtain due to agitation. CT brain w/wo obtained, noted with relatively stable right temporal lobe meningioma. Previous EEG with no epileptiform discharges. Repeat EEG with atypical epileptiform appearing discharges time locked with photic (reviewed personally), ?photoparoxysmal response. No seizures. She has not had clinical seizures or seizure-like activity.  Subclinical seizures are in the differential although less likely.    Overall, her mentation has improved and she is more alert, calm today.     Acute encephalopathy/hyperactive delirium, likely toxic-metabolic, ddx as above, improved  History of right temporal meningioma  ?Seizure history, on prophylactic ASM (lamotrigine)     Plan:      MRI brain w/o ordered, attempted three times but she could not tolerate due to agitation. Ideally, MRI brain w/wo would be higher yield to further assess meningioma/growth. She does have ESRD but type II lissa agents are generally safe in this patient population. Discussed with primary team who stated nephrology prefers to avoid contrast. At this time, will dc order given multiple unsuccessful events. Recommend obtaining outpatient.  Solumedrol dc'd per primary team as may have contributed to hyperactive delirium.  Was on LTG 25 mg BID at home. Given abnormal EEG and her underlying temporal mass, changed  to  mg BID to assure she is on therapeutic ASM dose as LTG up-titration will be longer. She has not had clinical seizure activity previously.  Vitamin B12, folate, syphilis screen, wnl, f/u vitamin B1. Previous

## 2024-01-05 NOTE — DISCHARGE INSTR - COC
Continuity of Care Form    Patient Name: Lupe Ojeda   :  1950  MRN:  [id number]    Admit date:  2023  Discharge date:  ***    Code Status Order: Full Code   Advance Directives:     Admitting Physician:  Claudia Stauffer MD  PCP: Kayleigh Cardoso MD    Discharging Nurse: ***  Discharging Hospital Unit/Room#: 2125/2125-01  Discharging Unit Phone Number: ***    Emergency Contact:   Extended Emergency Contact Information  Primary Emergency Contact: Sea Ojeda  Address: [address] [place] [address]  Home Phone: [phone]  Work Phone: [phone]  Mobile Phone: [phone]  Relation: Child  Preferred language: English   needed? No  Secondary Emergency Contact: OMARCYRIL  Home Phone: [phone]  Relation: Child  Preferred language: English    Past Surgical History:  Past Surgical History:   Procedure Laterality Date    AV FISTULA CREATION Right     CARDIAC CATHETERIZATION      CARDIAC CATHETERIZATION  2023    CARDIAC PROCEDURE N/A 2023    Left heart cath / coronary angiography w grafts performed by Ken Mina MD at [facility] CARDIAC CATH LAB    CARDIAC PROCEDURE N/A 2023    Percutaneous coronary intervention performed by Ken Mina MD at [facility] CARDIAC CATH LAB    CARDIAC PROCEDURE N/A 2023    Intravascular ultrasound performed by Ken Mina MD at [facility] CARDIAC CATH LAB    CARDIOVERSION      COLONOSCOPY N/A 03/15/2022    COLONOSCOPY DIAGNOSTIC performed by Manjeet Wall MD at [facility] ENDO    CORONARY ARTERY BYPASS GRAFT      x 3, Dr. chavis    INTUBATION  2022         IR NONTUNNELED VASCULAR CATHETER  2022    IR NONTUNNELED VASCULAR CATHETER 2022 SEBASTIAN Mirza [facility] SPECIAL PROCEDURES    IR TUNNELED CATHETER PLACEMENT GREATER THAN 5 YEARS  2022    IR TUNNELED CATHETER PLACEMENT GREATER THAN 5 YEARS 2022 [facility] SPECIAL PROCEDURES    THORACENTESIS  03/10/2022          chronic kidney disease with heart failure and with stage 5 chronic kidney disease, or end stage renal disease ([facility]) I13.2    Gastroesophageal reflux disease K21.9    Moderate anxiety F41.9    Other age-related cataract H25.89    History of GI bleed Z87.19    Chronic combined systolic and diastolic CHF (congestive heart failure) ([facility]) I50.42    Degenerative disease of nervous system, unspecified ([facility]) G31.9    Allergic rhinitis J30.9    Acquired hypothyroidism E03.9    Type 2 diabetes mellitus with polyneuropathy ([facility]) E11.42    Chronic respiratory failure with hypoxia ([facility]) J96.11    Other osteoporosis without current pathological fracture M81.8    Moderate vascular dementia with anxiety ([facility]) F01.B4    Recurrent falls while walking R29.6    Persistent atrial fibrillation ([facility]) I48.19    Slow transit constipation K59.01    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    History of type 2 diabetes mellitus Z86.39    History of chronic atrial fibrillation Z86.79    Acute encephalopathy G93.40    Bacteremia R78.81    Actinomyces infection A42.9    ESRD (end stage renal disease) on dialysis ([facility]) N18.6, Z99.2    Other seizures G40.89    Pressure ulcer of coccygeal region, stage III ([facility]) L89.153    Bilateral pleural effusion J90    CAD S/P percutaneous coronary angioplasty I25.10, Z98.61    H/O heart artery stent Z95.5    Abnormal echocardiogram R93.1    Mixed hyperlipidemia E78.2    Sepsis due to urinary tract infection ([facility]) A41.9, N39.0    Renovascular hypertension I15.0    Goals of care, counseling/discussion Z71.89    DNR (do not resuscitate) discussion Z71.89    ACP (advance care planning) Z71.89    Palliative care encounter Z51.5    Acute cystitis with hematuria N30.01       Isolation/Infection:   Isolation            Contact          Patient Infection Status       Infection Onset Added Last Indicated Last Indicated By Review Planned Expiration Resolved Resolved By    VRE 07/29/22 07/30/22 07/29/22 Culture, Urine             Wound # 2 right lower medial leg (Active)   Wound Image   12/18/23 1335   Wound Etiology Venous 01/02/24 1447   Dressing Status Other (Comment) 01/02/24 1447   Wound Cleansed Not Cleansed 01/02/24 1447   Dressing/Treatment Other (comment) 12/18/23 1451   Wound Length (cm) 0 cm 01/02/24 1447   Wound Width (cm) 0 cm 01/02/24 1447   Wound Depth (cm) 0 cm 01/02/24 1447   Wound Surface Area (cm^2) 0 cm^2 01/02/24 1447   Change in Wound Size % (l*w) 100 01/02/24 1447   Wound Volume (cm^3) 0 cm^3 01/02/24 1447   Wound Healing % 100 01/02/24 1447   Post-Procedure Length (cm) 2.5 cm 12/18/23 1335   Post-Procedure Width (cm) 3.5 cm 12/18/23 1335   Post-Procedure Depth (cm) 0.1 cm 12/18/23 1335   Post-Procedure Surface Area (cm^2) 8.75 cm^2 12/18/23 1335   Post-Procedure Volume (cm^3) 0.875 cm^3 12/18/23 1335   Wound Assessment Other (Comment) 01/02/24 1447   Drainage Amount None (dry) 01/02/24 1447   Drainage Description Serosanguinous;Yellow 12/18/23 1335   Odor None 01/02/24 1447   Marian-wound Assessment Dry/flaky 01/02/24 1447   Margins Attached edges 01/02/24 1447   Wound Thickness Description not for Pressure Injury Full thickness 12/18/23 1335   Number of days: 17        Elimination:  Continence:   Bowel: {YES / NO:19727}  Bladder: {YES / NO:19727}  Urinary Catheter: {Urinary Catheter:366894912}   Colostomy/Ileostomy/Ileal Conduit: {YES / NO:19727}       Date of Last BM: ***  No intake or output data in the 24 hours ending 01/05/24 1332  I/O last 3 completed shifts:  In: 740 [P.O.:240]  Out: 2500     Safety Concerns:     { WILL Safety Concerns:426343147}    Impairments/Disabilities:      { WILL Impairments/Disabilities:750770730}    Nutrition Therapy:  Current Nutrition Therapy:   { WILL Diet List:823531284}    Routes of Feeding: {CHP DME Other Feedings:103540711}  Liquids: {Slp liquid thickness:72843}  Daily Fluid Restriction: {CHP DME Yes amt example:717711089}  Last Modified Barium Swallow with Video (Video  Fall with Harm Risk

## 2024-01-05 NOTE — CARE COORDINATION
ONGOING DISCHARGE PLAN:    Patient remains confused    Needs lots of assistance     Family still refusing vns and snf    ATB    MRI attempted unsuccessful     EEG abnormal     Will continue to follow for additional discharge needs.    If patient is discharged prior to next notation, then this note serves as note for discharge by case management.    Electronically signed by Jaquelin Lee RN on 1/5/2024 at 11:52 AM

## 2024-01-06 LAB — VIT B1 PYROPHOSHATE BLD-SCNC: 102 NMOL/L (ref 70–180)

## 2024-01-08 ENCOUNTER — HOSPITAL ENCOUNTER (INPATIENT)
Age: 74
LOS: 8 days | Discharge: ANOTHER HOSPITAL | DRG: 080 | End: 2024-01-16
Attending: STUDENT IN AN ORGANIZED HEALTH CARE EDUCATION/TRAINING PROGRAM | Admitting: INTERNAL MEDICINE
Payer: MEDICARE

## 2024-01-08 ENCOUNTER — APPOINTMENT (OUTPATIENT)
Dept: GENERAL RADIOLOGY | Age: 74
DRG: 080 | End: 2024-01-08
Payer: MEDICARE

## 2024-01-08 ENCOUNTER — APPOINTMENT (OUTPATIENT)
Dept: CT IMAGING | Age: 74
DRG: 080 | End: 2024-01-08
Payer: MEDICARE

## 2024-01-08 DIAGNOSIS — R41.82 ALTERED MENTAL STATUS, UNSPECIFIED ALTERED MENTAL STATUS TYPE: Primary | ICD-10-CM

## 2024-01-08 LAB
ALBUMIN SERPL-MCNC: 3.6 G/DL (ref 3.5–5.2)
ALP SERPL-CCNC: 116 U/L (ref 35–104)
ALT SERPL-CCNC: 18 U/L (ref 5–33)
ANION GAP SERPL CALCULATED.3IONS-SCNC: 19 MMOL/L (ref 9–17)
AST SERPL-CCNC: 25 U/L
BASOPHILS # BLD: 0.08 K/UL (ref 0–0.2)
BASOPHILS NFR BLD: 1 % (ref 0–2)
BILIRUB SERPL-MCNC: 0.5 MG/DL (ref 0.3–1.2)
BILIRUB UR QL STRIP: NEGATIVE
BUN SERPL-MCNC: 30 MG/DL (ref 8–23)
CALCIUM SERPL-MCNC: 8.9 MG/DL (ref 8.6–10.4)
CHLORIDE SERPL-SCNC: 97 MMOL/L (ref 98–107)
CLARITY UR: CLEAR
CO2 SERPL-SCNC: 24 MMOL/L (ref 20–31)
COLOR UR: YELLOW
CREAT SERPL-MCNC: 4.5 MG/DL (ref 0.5–0.9)
EOSINOPHIL # BLD: 0.08 K/UL (ref 0–0.4)
EOSINOPHILS RELATIVE PERCENT: 1 % (ref 0–4)
ERYTHROCYTE [DISTWIDTH] IN BLOOD BY AUTOMATED COUNT: 18 % (ref 11.5–14.9)
FLUAV RNA RESP QL NAA+PROBE: NOT DETECTED
FLUBV RNA RESP QL NAA+PROBE: NOT DETECTED
GFR SERPL CREATININE-BSD FRML MDRD: 10 ML/MIN/1.73M2
GLUCOSE SERPL-MCNC: 198 MG/DL (ref 70–99)
GLUCOSE UR STRIP-MCNC: ABNORMAL MG/DL
HCT VFR BLD AUTO: 39.5 % (ref 36–46)
HGB BLD-MCNC: 12.5 G/DL (ref 12–16)
HGB UR QL STRIP.AUTO: ABNORMAL
KETONES UR STRIP-MCNC: NEGATIVE MG/DL
LACTATE BLDV-SCNC: 2.3 MMOL/L (ref 0.5–2.2)
LACTATE BLDV-SCNC: 2.5 MMOL/L (ref 0.5–2.2)
LEUKOCYTE ESTERASE UR QL STRIP: ABNORMAL
LYMPHOCYTES NFR BLD: 0.25 K/UL (ref 1–4.8)
LYMPHOCYTES RELATIVE PERCENT: 3 % (ref 24–44)
MCH RBC QN AUTO: 29.3 PG (ref 26–34)
MCHC RBC AUTO-ENTMCNC: 31.7 G/DL (ref 31–37)
MCV RBC AUTO: 92.5 FL (ref 80–100)
MONOCYTES NFR BLD: 0.42 K/UL (ref 0.1–1.3)
MONOCYTES NFR BLD: 5 % (ref 1–7)
MORPHOLOGY: ABNORMAL
NEUTROPHILS NFR BLD: 90 % (ref 36–66)
NEUTS SEG NFR BLD: 7.57 K/UL (ref 1.3–9.1)
NITRITE UR QL STRIP: NEGATIVE
PH UR STRIP: 8 [PH] (ref 5–8)
PLATELET # BLD AUTO: 140 K/UL (ref 150–450)
PMV BLD AUTO: 7.8 FL (ref 6–12)
POTASSIUM SERPL-SCNC: 4.3 MMOL/L (ref 3.7–5.3)
PROCALCITONIN SERPL-MCNC: 0.32 NG/ML
PROT SERPL-MCNC: 6.3 G/DL (ref 6.4–8.3)
PROT UR STRIP-MCNC: ABNORMAL MG/DL
RBC # BLD AUTO: 4.27 M/UL (ref 4–5.2)
SARS-COV-2 RNA RESP QL NAA+PROBE: NOT DETECTED
SODIUM SERPL-SCNC: 140 MMOL/L (ref 135–144)
SOURCE: NORMAL
SP GR UR STRIP: 1.02 (ref 1–1.03)
SPECIMEN DESCRIPTION: NORMAL
TROPONIN I SERPL HS-MCNC: 91 NG/L (ref 0–14)
UROBILINOGEN UR STRIP-ACNC: NORMAL EU/DL (ref 0–1)
WBC OTHER # BLD: 8.4 K/UL (ref 3.5–11)

## 2024-01-08 PROCEDURE — 87636 SARSCOV2 & INF A&B AMP PRB: CPT

## 2024-01-08 PROCEDURE — 71045 X-RAY EXAM CHEST 1 VIEW: CPT

## 2024-01-08 PROCEDURE — 81001 URINALYSIS AUTO W/SCOPE: CPT

## 2024-01-08 PROCEDURE — 2060000000 HC ICU INTERMEDIATE R&B

## 2024-01-08 PROCEDURE — 2580000003 HC RX 258: Performed by: STUDENT IN AN ORGANIZED HEALTH CARE EDUCATION/TRAINING PROGRAM

## 2024-01-08 PROCEDURE — 99285 EMERGENCY DEPT VISIT HI MDM: CPT

## 2024-01-08 PROCEDURE — 80053 COMPREHEN METABOLIC PANEL: CPT

## 2024-01-08 PROCEDURE — 83605 ASSAY OF LACTIC ACID: CPT

## 2024-01-08 PROCEDURE — 84484 ASSAY OF TROPONIN QUANT: CPT

## 2024-01-08 PROCEDURE — 85025 COMPLETE CBC W/AUTO DIFF WBC: CPT

## 2024-01-08 PROCEDURE — 93005 ELECTROCARDIOGRAM TRACING: CPT | Performed by: STUDENT IN AN ORGANIZED HEALTH CARE EDUCATION/TRAINING PROGRAM

## 2024-01-08 PROCEDURE — 84145 PROCALCITONIN (PCT): CPT

## 2024-01-08 PROCEDURE — 70450 CT HEAD/BRAIN W/O DYE: CPT

## 2024-01-08 PROCEDURE — 36415 COLL VENOUS BLD VENIPUNCTURE: CPT

## 2024-01-08 RX ORDER — 0.9 % SODIUM CHLORIDE 0.9 %
500 INTRAVENOUS SOLUTION INTRAVENOUS ONCE
Status: COMPLETED | OUTPATIENT
Start: 2024-01-08 | End: 2024-01-09

## 2024-01-08 RX ADMIN — SODIUM CHLORIDE 500 ML: 9 INJECTION, SOLUTION INTRAVENOUS at 22:06

## 2024-01-09 PROBLEM — I48.21 PERMANENT ATRIAL FIBRILLATION (HCC): Status: ACTIVE | Noted: 2024-01-09

## 2024-01-09 PROBLEM — I25.5 ISCHEMIC CARDIOMYOPATHY: Status: ACTIVE | Noted: 2024-01-09

## 2024-01-09 LAB
ANION GAP SERPL CALCULATED.3IONS-SCNC: 18 MMOL/L (ref 9–17)
BACTERIA URNS QL MICRO: ABNORMAL
BASOPHILS # BLD: 0 K/UL (ref 0–0.2)
BASOPHILS NFR BLD: 0 % (ref 0–2)
BUN SERPL-MCNC: 32 MG/DL (ref 8–23)
CALCIUM SERPL-MCNC: 8.6 MG/DL (ref 8.6–10.4)
CASTS #/AREA URNS LPF: ABNORMAL /LPF
CASTS #/AREA URNS LPF: ABNORMAL /LPF
CHLORIDE SERPL-SCNC: 99 MMOL/L (ref 98–107)
CO2 SERPL-SCNC: 24 MMOL/L (ref 20–31)
CREAT SERPL-MCNC: 4.9 MG/DL (ref 0.5–0.9)
EKG ATRIAL RATE: 115 BPM
EKG Q-T INTERVAL: 344 MS
EKG QRS DURATION: 98 MS
EKG QTC CALCULATION (BAZETT): 478 MS
EKG R AXIS: -7 DEGREES
EKG T AXIS: 164 DEGREES
EKG VENTRICULAR RATE: 116 BPM
EOSINOPHIL # BLD: 0 K/UL (ref 0–0.4)
EOSINOPHILS RELATIVE PERCENT: 0 % (ref 0–4)
EPI CELLS #/AREA URNS HPF: ABNORMAL /HPF
ERYTHROCYTE [DISTWIDTH] IN BLOOD BY AUTOMATED COUNT: 17.8 % (ref 11.5–14.9)
GFR SERPL CREATININE-BSD FRML MDRD: 9 ML/MIN/1.73M2
GLUCOSE BLD-MCNC: 142 MG/DL (ref 65–105)
GLUCOSE BLD-MCNC: 177 MG/DL (ref 65–105)
GLUCOSE SERPL-MCNC: 156 MG/DL (ref 70–99)
HCT VFR BLD AUTO: 36.4 % (ref 36–46)
HGB BLD-MCNC: 11.5 G/DL (ref 12–16)
LACTATE BLDV-SCNC: 1.3 MMOL/L (ref 0.5–2.2)
LYMPHOCYTES NFR BLD: 0.88 K/UL (ref 1–4.8)
LYMPHOCYTES RELATIVE PERCENT: 13 % (ref 24–44)
MCH RBC QN AUTO: 29.4 PG (ref 26–34)
MCHC RBC AUTO-ENTMCNC: 31.6 G/DL (ref 31–37)
MCV RBC AUTO: 93.1 FL (ref 80–100)
MONOCYTES NFR BLD: 0.61 K/UL (ref 0.1–1.3)
MONOCYTES NFR BLD: 9 % (ref 1–7)
MORPHOLOGY: ABNORMAL
NEUTROPHILS NFR BLD: 78 % (ref 36–66)
NEUTS SEG NFR BLD: 5.31 K/UL (ref 1.3–9.1)
PLATELET # BLD AUTO: 135 K/UL (ref 150–450)
PMV BLD AUTO: 8.4 FL (ref 6–12)
POTASSIUM SERPL-SCNC: 3.9 MMOL/L (ref 3.7–5.3)
RBC # BLD AUTO: 3.91 M/UL (ref 4–5.2)
RBC #/AREA URNS HPF: ABNORMAL /HPF
SODIUM SERPL-SCNC: 141 MMOL/L (ref 135–144)
TROPONIN I SERPL HS-MCNC: 86 NG/L (ref 0–14)
WBC #/AREA URNS HPF: ABNORMAL /HPF
WBC OTHER # BLD: 6.8 K/UL (ref 3.5–11)

## 2024-01-09 PROCEDURE — 80048 BASIC METABOLIC PNL TOTAL CA: CPT

## 2024-01-09 PROCEDURE — 6360000002 HC RX W HCPCS: Performed by: INTERNAL MEDICINE

## 2024-01-09 PROCEDURE — 36415 COLL VENOUS BLD VENIPUNCTURE: CPT

## 2024-01-09 PROCEDURE — 2500000003 HC RX 250 WO HCPCS: Performed by: INTERNAL MEDICINE

## 2024-01-09 PROCEDURE — 6370000000 HC RX 637 (ALT 250 FOR IP): Performed by: NURSE PRACTITIONER

## 2024-01-09 PROCEDURE — 2580000003 HC RX 258: Performed by: INTERNAL MEDICINE

## 2024-01-09 PROCEDURE — 5A1D70Z PERFORMANCE OF URINARY FILTRATION, INTERMITTENT, LESS THAN 6 HOURS PER DAY: ICD-10-PCS | Performed by: INTERNAL MEDICINE

## 2024-01-09 PROCEDURE — 2580000003 HC RX 258

## 2024-01-09 PROCEDURE — 2060000000 HC ICU INTERMEDIATE R&B

## 2024-01-09 PROCEDURE — 6370000000 HC RX 637 (ALT 250 FOR IP)

## 2024-01-09 PROCEDURE — 2500000003 HC RX 250 WO HCPCS

## 2024-01-09 PROCEDURE — 99223 1ST HOSP IP/OBS HIGH 75: CPT | Performed by: NURSE PRACTITIONER

## 2024-01-09 PROCEDURE — 85025 COMPLETE CBC W/AUTO DIFF WBC: CPT

## 2024-01-09 PROCEDURE — 99223 1ST HOSP IP/OBS HIGH 75: CPT | Performed by: INTERNAL MEDICINE

## 2024-01-09 PROCEDURE — 83605 ASSAY OF LACTIC ACID: CPT

## 2024-01-09 PROCEDURE — 87086 URINE CULTURE/COLONY COUNT: CPT

## 2024-01-09 PROCEDURE — 6360000002 HC RX W HCPCS

## 2024-01-09 PROCEDURE — 90935 HEMODIALYSIS ONE EVALUATION: CPT

## 2024-01-09 PROCEDURE — 2580000003 HC RX 258: Performed by: STUDENT IN AN ORGANIZED HEALTH CARE EDUCATION/TRAINING PROGRAM

## 2024-01-09 PROCEDURE — 82947 ASSAY GLUCOSE BLOOD QUANT: CPT

## 2024-01-09 PROCEDURE — 99223 1ST HOSP IP/OBS HIGH 75: CPT | Performed by: PSYCHIATRY & NEUROLOGY

## 2024-01-09 PROCEDURE — 93010 ELECTROCARDIOGRAM REPORT: CPT | Performed by: INTERNAL MEDICINE

## 2024-01-09 PROCEDURE — 6360000002 HC RX W HCPCS: Performed by: STUDENT IN AN ORGANIZED HEALTH CARE EDUCATION/TRAINING PROGRAM

## 2024-01-09 RX ORDER — LAMOTRIGINE 25 MG/1
25 TABLET ORAL 2 TIMES DAILY
Status: DISCONTINUED | OUTPATIENT
Start: 2024-01-09 | End: 2024-01-09

## 2024-01-09 RX ORDER — SODIUM CHLORIDE 0.9 % (FLUSH) 0.9 %
5-40 SYRINGE (ML) INJECTION EVERY 12 HOURS SCHEDULED
Status: DISCONTINUED | OUTPATIENT
Start: 2024-01-09 | End: 2024-01-16 | Stop reason: HOSPADM

## 2024-01-09 RX ORDER — LINEZOLID 2 MG/ML
600 INJECTION, SOLUTION INTRAVENOUS EVERY 12 HOURS
Status: DISCONTINUED | OUTPATIENT
Start: 2024-01-09 | End: 2024-01-10

## 2024-01-09 RX ORDER — ALBUTEROL SULFATE 2.5 MG/3ML
2.5 SOLUTION RESPIRATORY (INHALATION) EVERY 6 HOURS PRN
Status: DISCONTINUED | OUTPATIENT
Start: 2024-01-09 | End: 2024-01-16 | Stop reason: HOSPADM

## 2024-01-09 RX ORDER — LEVOTHYROXINE SODIUM 0.03 MG/1
25 TABLET ORAL
Status: DISCONTINUED | OUTPATIENT
Start: 2024-01-09 | End: 2024-01-16 | Stop reason: HOSPADM

## 2024-01-09 RX ORDER — SODIUM CHLORIDE 0.9 % (FLUSH) 0.9 %
10 SYRINGE (ML) INJECTION PRN
Status: DISCONTINUED | OUTPATIENT
Start: 2024-01-09 | End: 2024-01-16 | Stop reason: HOSPADM

## 2024-01-09 RX ORDER — CLOPIDOGREL BISULFATE 75 MG/1
75 TABLET ORAL DAILY
Status: DISCONTINUED | OUTPATIENT
Start: 2024-01-09 | End: 2024-01-16 | Stop reason: HOSPADM

## 2024-01-09 RX ORDER — DEXAMETHASONE SODIUM PHOSPHATE 4 MG/ML
2 INJECTION, SOLUTION INTRA-ARTICULAR; INTRALESIONAL; INTRAMUSCULAR; INTRAVENOUS; SOFT TISSUE EVERY 6 HOURS
Status: DISCONTINUED | OUTPATIENT
Start: 2024-01-09 | End: 2024-01-11

## 2024-01-09 RX ORDER — ATORVASTATIN CALCIUM 40 MG/1
40 TABLET, FILM COATED ORAL EVERY EVENING
Status: DISCONTINUED | OUTPATIENT
Start: 2024-01-09 | End: 2024-01-16 | Stop reason: HOSPADM

## 2024-01-09 RX ORDER — AMIODARONE HYDROCHLORIDE 200 MG/1
100 TABLET ORAL EVERY MORNING
Status: DISCONTINUED | OUTPATIENT
Start: 2024-01-09 | End: 2024-01-16 | Stop reason: HOSPADM

## 2024-01-09 RX ORDER — METOPROLOL SUCCINATE 25 MG/1
25 TABLET, EXTENDED RELEASE ORAL EVERY EVENING
Status: DISCONTINUED | OUTPATIENT
Start: 2024-01-09 | End: 2024-01-13

## 2024-01-09 RX ORDER — HEPARIN SODIUM 5000 [USP'U]/ML
5000 INJECTION, SOLUTION INTRAVENOUS; SUBCUTANEOUS EVERY 8 HOURS SCHEDULED
Status: DISCONTINUED | OUTPATIENT
Start: 2024-01-09 | End: 2024-01-16 | Stop reason: HOSPADM

## 2024-01-09 RX ORDER — MIRTAZAPINE 15 MG/1
15 TABLET, FILM COATED ORAL NIGHTLY
Status: DISCONTINUED | OUTPATIENT
Start: 2024-01-09 | End: 2024-01-16 | Stop reason: HOSPADM

## 2024-01-09 RX ORDER — POLYETHYLENE GLYCOL 3350 17 G/17G
17 POWDER, FOR SOLUTION ORAL DAILY
Status: DISCONTINUED | OUTPATIENT
Start: 2024-01-09 | End: 2024-01-16 | Stop reason: HOSPADM

## 2024-01-09 RX ORDER — ACETAMINOPHEN 325 MG/1
650 TABLET ORAL EVERY 6 HOURS PRN
Status: DISCONTINUED | OUTPATIENT
Start: 2024-01-09 | End: 2024-01-16 | Stop reason: HOSPADM

## 2024-01-09 RX ORDER — CETIRIZINE HYDROCHLORIDE 10 MG/1
5 TABLET ORAL DAILY
Status: DISCONTINUED | OUTPATIENT
Start: 2024-01-10 | End: 2024-01-16 | Stop reason: HOSPADM

## 2024-01-09 RX ORDER — ONDANSETRON 2 MG/ML
4 INJECTION INTRAMUSCULAR; INTRAVENOUS EVERY 6 HOURS PRN
Status: DISCONTINUED | OUTPATIENT
Start: 2024-01-09 | End: 2024-01-16 | Stop reason: HOSPADM

## 2024-01-09 RX ORDER — DIPHENHYDRAMINE HYDROCHLORIDE 50 MG/ML
12.5 INJECTION INTRAMUSCULAR; INTRAVENOUS ONCE
Status: COMPLETED | OUTPATIENT
Start: 2024-01-09 | End: 2024-01-09

## 2024-01-09 RX ORDER — HYDROXYZINE 50 MG/1
50 TABLET, FILM COATED ORAL DAILY PRN
Status: DISCONTINUED | OUTPATIENT
Start: 2024-01-09 | End: 2024-01-16 | Stop reason: HOSPADM

## 2024-01-09 RX ORDER — DEXMEDETOMIDINE HYDROCHLORIDE 4 UG/ML
.1-1.5 INJECTION, SOLUTION INTRAVENOUS CONTINUOUS
Status: DISCONTINUED | OUTPATIENT
Start: 2024-01-09 | End: 2024-01-16 | Stop reason: HOSPADM

## 2024-01-09 RX ORDER — BUDESONIDE AND FORMOTEROL FUMARATE DIHYDRATE 160; 4.5 UG/1; UG/1
2 AEROSOL RESPIRATORY (INHALATION)
Status: DISCONTINUED | OUTPATIENT
Start: 2024-01-09 | End: 2024-01-16 | Stop reason: HOSPADM

## 2024-01-09 RX ORDER — ACETAMINOPHEN 650 MG/1
650 SUPPOSITORY RECTAL EVERY 6 HOURS PRN
Status: DISCONTINUED | OUTPATIENT
Start: 2024-01-09 | End: 2024-01-16 | Stop reason: HOSPADM

## 2024-01-09 RX ORDER — FAMOTIDINE 20 MG/1
10 TABLET, FILM COATED ORAL
Status: DISCONTINUED | OUTPATIENT
Start: 2024-01-09 | End: 2024-01-16 | Stop reason: HOSPADM

## 2024-01-09 RX ORDER — SODIUM CHLORIDE 9 MG/ML
INJECTION, SOLUTION INTRAVENOUS PRN
Status: DISCONTINUED | OUTPATIENT
Start: 2024-01-09 | End: 2024-01-16 | Stop reason: HOSPADM

## 2024-01-09 RX ORDER — LANOLIN ALCOHOL/MO/W.PET/CERES
3 CREAM (GRAM) TOPICAL EVERY EVENING
Status: DISCONTINUED | OUTPATIENT
Start: 2024-01-09 | End: 2024-01-09

## 2024-01-09 RX ORDER — FUROSEMIDE 40 MG/1
80 TABLET ORAL EVERY OTHER DAY
Status: DISCONTINUED | OUTPATIENT
Start: 2024-01-09 | End: 2024-01-16 | Stop reason: HOSPADM

## 2024-01-09 RX ORDER — SODIUM CHLORIDE 9 MG/ML
INJECTION, SOLUTION INTRAVENOUS CONTINUOUS
Status: DISCONTINUED | OUTPATIENT
Start: 2024-01-09 | End: 2024-01-16 | Stop reason: HOSPADM

## 2024-01-09 RX ORDER — CETIRIZINE HYDROCHLORIDE 10 MG/1
10 TABLET ORAL DAILY
Status: DISCONTINUED | OUTPATIENT
Start: 2024-01-09 | End: 2024-01-09

## 2024-01-09 RX ORDER — MIDODRINE HYDROCHLORIDE 5 MG/1
5 TABLET ORAL 2 TIMES DAILY PRN
Status: DISCONTINUED | OUTPATIENT
Start: 2024-01-09 | End: 2024-01-16 | Stop reason: HOSPADM

## 2024-01-09 RX ORDER — LIDOCAINE 4 G/G
1 PATCH TOPICAL DAILY
Status: DISCONTINUED | OUTPATIENT
Start: 2024-01-09 | End: 2024-01-16 | Stop reason: HOSPADM

## 2024-01-09 RX ORDER — ASPIRIN 81 MG/1
81 TABLET ORAL DAILY
Status: DISCONTINUED | OUTPATIENT
Start: 2024-01-09 | End: 2024-01-16 | Stop reason: HOSPADM

## 2024-01-09 RX ORDER — ALBUTEROL SULFATE 90 UG/1
2 AEROSOL, METERED RESPIRATORY (INHALATION) EVERY 6 HOURS PRN
Status: DISCONTINUED | OUTPATIENT
Start: 2024-01-09 | End: 2024-01-16 | Stop reason: HOSPADM

## 2024-01-09 RX ORDER — ONDANSETRON 4 MG/1
4 TABLET, ORALLY DISINTEGRATING ORAL EVERY 8 HOURS PRN
Status: DISCONTINUED | OUTPATIENT
Start: 2024-01-09 | End: 2024-01-16 | Stop reason: HOSPADM

## 2024-01-09 RX ORDER — LACOSAMIDE 100 MG/1
100 TABLET ORAL 2 TIMES DAILY
Status: DISCONTINUED | OUTPATIENT
Start: 2024-01-09 | End: 2024-01-10

## 2024-01-09 RX ADMIN — DEXAMETHASONE SODIUM PHOSPHATE 2 MG: 4 INJECTION INTRA-ARTICULAR; INTRALESIONAL; INTRAMUSCULAR; INTRAVENOUS; SOFT TISSUE at 16:05

## 2024-01-09 RX ADMIN — PIPERACILLIN AND TAZOBACTAM 3375 MG: 3; .375 INJECTION, POWDER, FOR SOLUTION INTRAVENOUS at 11:51

## 2024-01-09 RX ADMIN — SODIUM CHLORIDE: 9 INJECTION, SOLUTION INTRAVENOUS at 09:55

## 2024-01-09 RX ADMIN — HEPARIN SODIUM 5000 UNITS: 5000 INJECTION INTRAVENOUS; SUBCUTANEOUS at 22:41

## 2024-01-09 RX ADMIN — DEXAMETHASONE SODIUM PHOSPHATE 2 MG: 4 INJECTION INTRA-ARTICULAR; INTRALESIONAL; INTRAMUSCULAR; INTRAVENOUS; SOFT TISSUE at 20:53

## 2024-01-09 RX ADMIN — ANTI-FUNGAL POWDER MICONAZOLE NITRATE TALC FREE: 1.42 POWDER TOPICAL at 20:52

## 2024-01-09 RX ADMIN — ANTI-FUNGAL POWDER MICONAZOLE NITRATE TALC FREE: 1.42 POWDER TOPICAL at 10:30

## 2024-01-09 RX ADMIN — LINEZOLID 600 MG: 2 INJECTION, SOLUTION INTRAVENOUS at 10:35

## 2024-01-09 RX ADMIN — DIPHENHYDRAMINE HYDROCHLORIDE 12.5 MG: 50 INJECTION INTRAMUSCULAR; INTRAVENOUS at 13:05

## 2024-01-09 RX ADMIN — LAMOTRIGINE 25 MG: 25 TABLET ORAL at 11:48

## 2024-01-09 RX ADMIN — MIRTAZAPINE 15 MG: 15 TABLET, FILM COATED ORAL at 20:53

## 2024-01-09 RX ADMIN — ASPIRIN 81 MG: 81 TABLET, COATED ORAL at 11:48

## 2024-01-09 RX ADMIN — PIPERACILLIN AND TAZOBACTAM 4500 MG: 4; .5 INJECTION, POWDER, FOR SOLUTION INTRAVENOUS at 03:26

## 2024-01-09 RX ADMIN — FAMOTIDINE 10 MG: 20 TABLET, FILM COATED ORAL at 11:48

## 2024-01-09 RX ADMIN — LACOSAMIDE 100 MG: 100 TABLET, FILM COATED ORAL at 03:27

## 2024-01-09 RX ADMIN — ONDANSETRON 4 MG: 2 INJECTION INTRAMUSCULAR; INTRAVENOUS at 09:48

## 2024-01-09 RX ADMIN — LEVOTHYROXINE SODIUM 25 MCG: 0.03 TABLET ORAL at 10:40

## 2024-01-09 RX ADMIN — METOPROLOL SUCCINATE 25 MG: 25 TABLET, EXTENDED RELEASE ORAL at 18:29

## 2024-01-09 RX ADMIN — CLOPIDOGREL BISULFATE 75 MG: 75 TABLET ORAL at 11:48

## 2024-01-09 RX ADMIN — LAMOTRIGINE 25 MG: 25 TABLET ORAL at 03:27

## 2024-01-09 RX ADMIN — LACOSAMIDE 100 MG: 100 TABLET, FILM COATED ORAL at 11:49

## 2024-01-09 RX ADMIN — LACOSAMIDE 100 MG: 100 TABLET, FILM COATED ORAL at 20:53

## 2024-01-09 RX ADMIN — LINEZOLID 600 MG: 2 INJECTION, SOLUTION INTRAVENOUS at 21:00

## 2024-01-09 RX ADMIN — DEXMEDETOMIDINE HYDROCHLORIDE 0.2 MCG/KG/HR: 400 INJECTION INTRAVENOUS at 18:36

## 2024-01-09 RX ADMIN — SODIUM CHLORIDE, PRESERVATIVE FREE 10 ML: 5 INJECTION INTRAVENOUS at 20:54

## 2024-01-09 RX ADMIN — DEXAMETHASONE SODIUM PHOSPHATE 2 MG: 4 INJECTION INTRA-ARTICULAR; INTRALESIONAL; INTRAMUSCULAR; INTRAVENOUS; SOFT TISSUE at 10:20

## 2024-01-09 RX ADMIN — MUPIROCIN: 20 OINTMENT TOPICAL at 16:07

## 2024-01-09 RX ADMIN — AMIODARONE HYDROCHLORIDE 100 MG: 200 TABLET ORAL at 11:48

## 2024-01-09 ASSESSMENT — PAIN SCALES - GENERAL
PAINLEVEL_OUTOF10: 0

## 2024-01-09 NOTE — PROGRESS NOTES
Rn spoke w/ Dr. Novak on unit in regards to clearance for MRI brain, states pt can only have MRI W/O contrast.

## 2024-01-09 NOTE — CONSULTS
[facility]   CONSULT NOTE:      DATE OF CONSULT 1/9/2024    REASON FOR CONSULTATION:  Encephalopathy      PCP Kayleigh Cardoso MD     CHIEF COMPLAINT: Patient not able to give a history.  She keeps yelling out \" I love you Robbi!\"      HISTORY OF PRESENT ILLNESS:     Patient is a 73-year-old female.  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  Neurology is following the patient.    Patient recently was released hospitalized and discharged when she was admitted for acute altered mental status..  At previous hospitalization patient underwent an EEG suggestive of sharp waves concerning for possible seizures.  Patient was started on antiepileptics    I was requested to see the patient by Dr. Daniel because of problems with encephalopathy that were up to the point where she cannot be controlled.  She is yelling sentences that do not make sense.  She does not look like she is agitated the point where she is swinging out on healthcare providers.    No accessory muscle use no abdominal breathing.  She cannot give a history.    I did speak to patient's daughter, Sea.  I was told by the daughter that this has happened before    ALLERGIES:  Allergies   Allergen Reactions    Latex Rash    Aleve [Naproxen Sodium]      Chronic kidney disease stage III, CHF    Apixaban      Severe GI bleed, needed blood transfusion      Naproxen      ESKD, CHF    Pioglitazone Other (See Comments)     Congestive heart failure    Claritin [Loratadine]     Keflex [Cephalexin]     Adhesive Tape     Keppra [Levetiracetam]     Lisinopril      Needs clarification of contraindication       HOME MEDICATIONS:  Medications Prior to Admission: lacosamide (VIMPAT) 100 MG TABS tablet, Take 1 tablet by mouth 2 times daily for 60 days. Max Daily Amount: 200 mg  furosemide (LASIX) 80 MG tablet, Take 1 tablet by mouth every other day Water pill, does not take on        FAMILY HISTORY:  Family History   Problem Relation Age of Onset    Diabetes Mother     Heart Disease Mother     Heart Disease Father     Diabetes Sister     High Blood Pressure Sister     Diabetes Maternal Grandmother     Breast Cancer Maternal Cousin        REVIEW OF SYSTEMS:  All other systems reviewed and are negative.      PHYSICAL EXAM:  Vital Signs Blood pressure (!) 140/83, pulse 92, temperature 97.3 °F (36.3 °C), temperature source Oral, resp. rate 18, height 1.524 m (5'), weight 59.5 kg (131 lb 2.8 oz), SpO2 92 %.  Oxygen Amount and Delivery:      Admission Weight Weight - Scale: 66 kg (145 lb 8.1 oz)    General Appearance     Head  Normocephalic, without obvious abnormality, atraumatic    Eyes  conjunctivae clear. PERRL,    Neck  no adenopathy, no carotid bruit, no JVD, supple, symmetrical, trachea midline and thyroid not enlarged, symmetric, no tenderness/mass/nodules  Lungs coarse breath sounds no crackles rales or wheezes  Heart: regular rate and rhythm, S1, S2 normal, no murmur, click, rub or gallop  Abdomen  soft, non-tender; bowel sounds normal;   Extremities    Skin  Skin color, texture, turgor normal. No rashes or lesions  Neurologic: Alert and oriented X 3,     Imaging  Chest x-ray reveals dialysis catheter    Lab Review  CBC     Lab Results   Component Value Date/Time    WBC 6.8 01/09/2024 08:51 AM    RBC 3.91 01/09/2024 08:51 AM    HGB 11.5 01/09/2024 08:51 AM    HCT 36.4 01/09/2024 08:51 AM     01/09/2024 08:51 AM    MCV 93.1 01/09/2024 08:51 AM    MCH 29.4 01/09/2024 08:51 AM    MCHC 31.6 01/09/2024 08:51 AM    RDW 17.8 01/09/2024 08:51 AM    NRBC 2 07/20/2022 12:30 AM    METASPCT 1 07/23/2022 10:11 AM    LYMPHOPCT 13 01/09/2024 08:51 AM    MONOPCT 9 01/09/2024 08:51 AM    BASOPCT 0 01/09/2024 08:51 AM    MONOSABS 0.61 01/09/2024 08:51 AM    LYMPHSABS 0.88 01/09/2024 08:51 AM    EOSABS 0.00 01/09/2024 08:51 AM    BASOSABS 0.00 01/09/2024 08:51 AM    DIFFTYPE NOT REPORTED

## 2024-01-09 NOTE — ED NOTES
Report given to SHERITA Rodriguez from U.   Report method by phone   The following was reviewed with receiving RN:   Current vital signs:  BP (!) 134/57   Pulse 98   Temp 97.3 °F (36.3 °C) (Axillary)   Resp 10   Wt 66 kg (145 lb 8.1 oz)   SpO2 93%   BMI 29.39 kg/m²                      Any medication or safety alerts were reviewed. Any pending diagnostics and notifications were also reviewed, as well as any safety concerns or issues, abnormal labs, abnormal imaging, and abnormal assessment findings. Questions were answered.

## 2024-01-09 NOTE — PROGRESS NOTES
Pt admitted to room 2106 from ED. Pt orientated to room, vitals taken, Pt assessed. Pt confused and AOx 0. Family at bedside.

## 2024-01-09 NOTE — PLAN OF CARE
Problem: Discharge Planning  Goal: Discharge to home or other facility with appropriate resources  1/9/2024 1441 by Estela Julien RN  Outcome: Progressing     Problem: Skin/Tissue Integrity  Goal: Absence of new skin breakdown  Description: 1.  Monitor for areas of redness and/or skin breakdown  2.  Assess vascular access sites hourly  3.  Every 4-6 hours minimum:  Change oxygen saturation probe site  4.  Every 4-6 hours:  If on nasal continuous positive airway pressure, respiratory therapy assess nares and determine need for appliance change or resting period.  1/9/2024 1441 by Estela Julien, RN  Outcome: Progressing     Problem: Safety - Adult  Goal: Free from fall injury  1/9/2024 1441 by Estela Julien RN  Outcome: Progressing     Problem: Confusion  Goal: Confusion, delirium, dementia, or psychosis is improved or at baseline  Description: INTERVENTIONS:  1. Assess for possible contributors to thought disturbance, including medications, impaired vision or hearing, underlying metabolic abnormalities, dehydration, psychiatric diagnoses, and notify attending LIP  2. [place] high risk fall precautions, as indicated  3. Provide frequent short contacts to provide reality reorientation, refocusing and direction  4. Decrease environmental stimuli, including noise as appropriate  5. Monitor and intervene to maintain adequate nutrition, hydration, elimination, sleep and activity  6. If unable to ensure safety without constant attention obtain sitter and review sitter guidelines with assigned personnel  7. Initiate Psychosocial CNS and Spiritual Care consult, as indicated  1/9/2024 1441 by Estela Julien, RN  Outcome: Progressing     Problem: Risk for Elopement  Goal: Patient will not exit the unit/facility without proper excort  Outcome: Progressing

## 2024-01-09 NOTE — ED PROVIDER NOTES
EMERGENCY DEPARTMENT ENCOUNTER   ATTENDING ATTESTATION     Pt Name: Lupe Ojeda  MRN: [id number]  Birthdate 1950  Date of evaluation: 1/8/24       Lupe Ojeda is a 73 y.o. female who presents with Altered Mental Status (Worsening today )    More disoriented today yelling things    Plan is septic workup and altered mental status workup    MDM:     Workup reveals urinary tract infection    Has a meningioma but we reviewed with radiology this is unchanged and discussed with neurology okay to stay here    Treated with IV fluids no 30 cc/kg bolus given ESRD and concern for volume overload    Giving Zosyn          Vitals:   Vitals:    01/08/24 2148 01/08/24 2329 01/08/24 2358 01/09/24 0058   BP: (!) 169/87 (!) 158/59 (!) 134/57 (!) 148/73   Pulse:  91 98 74   Resp:  14 10 16   Temp: 97.3 °F (36.3 °C)   97.6 °F (36.4 °C)   TempSrc: Axillary   Axillary   SpO2:  95% 93% 98%   Weight:    63.4 kg (139 lb 12.4 oz)   Height:    1.524 m (5')         I personally saw and examined the patient. I have reviewed and agree with the resident's findings, including all diagnostic interpretations and treatment plan as written. I was present for the key portions of any procedures performed and the inclusive time noted for any critical care statement.    Nikolay Pascual MD  Attending Emergency Physician           Nikolay Pascual MD  01/09/24 0108

## 2024-01-09 NOTE — PROGRESS NOTES
Getting transferred to intermediate ICU for Precedex drip  Discussed with pharmacist, will start Precedex drip once patient physically moved to ICU  Discussed with Dr. Denis

## 2024-01-09 NOTE — ED PROVIDER NOTES
[facility] ED  Emergency Department Encounter  Emergency Medicine Resident     Pt Name:Lupe Ojeda  MRN: [id number]  Birthdate 1950  Date of evaluation: 1/8/24  PCP:  Kayleigh Cardoso MD  Note Started: 8:47 PM EST      CHIEF COMPLAINT       Chief Complaint   Patient presents with    Altered Mental Status     Worsening today        HISTORY OF PRESENT ILLNESS  (Location/Symptom, Timing/Onset, Context/Setting, Quality, Duration, Modifying Factors, Severity.)      Lupe Ojeda is a 73 y.o. female who presents with altered mental status.  Patient's family reports that the patient has become significantly confused today.  They report that she was recently hospitalized due to similar issues.  She recently did start a new AED and had been taking Seroquel but stopped due to poor side effects.  Reports that she had episodes of hypertension with her dialysis sessions and they recommended stopping that medication a few days ago.  Last dialysis session was on Saturday.  They report during last admission they were unable to obtain an MRI to assess for brain masses due to significant agitation.  Patient's family reports that she has been eating and drinking well without any difficulty but today has been reporting body aches and pain in her head and her stomach.  She also has been making statements that are not applicable to questions being asked.  They also report that she has had an increasingly productive cough.  They deny any vomiting.  They deny any falls or trauma.  Patient has had a bowel movement today that appeared normal.    PAST MEDICAL / SURGICAL / SOCIAL / FAMILY HISTORY      has a past medical history of Acute CVA (cerebrovascular accident) ([facility]), Acute kidney injury superimposed on CKD ([facility]), Acute on chronic combined systolic (congestive) and diastolic (congestive) heart failure ([facility]), Acute respiratory failure with hypoxia and hypercapnia ([facility]), JOY (acute kidney injury) ([facility]), Arthritis,  to situation.  She appears in some distress as she is actively yelling out but once calm down does not appear in distress.  She appears nontoxic although mildly tachycardic, afebrile normotensive saturating well on room air without any signs of respiratory distress.  Differential for patient's symptoms include stroke, intracranial bleed, intracranial mass, intoxication, side effect of medication, UTI, MI, arrhythmia.  Patient is an ESRD patient and metabolic encephalopathy may also be possible.  Anticipate patient will likely require admission.    Amount and/or Complexity of Data Reviewed  Labs: ordered.  Radiology: ordered.  ECG/medicine tests: ordered.    Risk  Prescription drug management.  Decision regarding hospitalization.        EKG    All EKG's are interpreted by the Emergency Department Physician who either signs or Co-signs this chart in the absence of a cardiologist.    EMERGENCY DEPARTMENT COURSE:    ED Course as of 01/08/24 2345 Mon Jan 08, 2024   2340 Troponin, High Sensitivity(!!): 91  Appears somewhat baseline [HO]   2341 Lactic Acid(!): 2.5  Likely elevated secondary due to status as a dialysis patient [HO]   2341 Leukocyte Esterase, Urine(!): SMALL [HO]   2341 Nitrite, Urine: NEGATIVE  Not consistent with UTI [HO]   2341 Procalcitonin(!): 0.32  Not consistent with active infection [HO]   2341 CT head showing mass in the right temporal region and some vasogenic edema surrounding and mass effect which upon review with radiologist is unchanged since previous study [HO]   2341 Chest x-ray showing no sign of pneumonia [HO]   2342 SARS-CoV-2 RNA, RT PCR: Not Detected [HO]   2342 INFLUENZA A: Not Detected [HO]   2342 INFLUENZA B: Not Detected [HO]   2342 Spoke with neurology regarding recommendations and they recommend getting an MRI in the a.m. to assess size of the mass and may require procedural sedation to obtain given patient's history of claustrophobia [HO]      ED Course User Index  [HO] Shayne

## 2024-01-09 NOTE — ED NOTES
The following labs labeled with pt sticker and tubed to lab:     [x] COVID-19 swab & Flu  [x] RSV  [] Strep  [] Pelvic

## 2024-01-09 NOTE — PROGRESS NOTES
Rn called report to ICU RN, all questions and concerns answered. Rn stated room is not ready yet an will give writer a call when ready.

## 2024-01-09 NOTE — CONSULTS
NEPHROLOGY CONSULT     Patient :  Lupe Ojeda; 73 y.o. MRN# [id number]  Location:  2106/2106-01  Attending:  Ludwig Daniel MD  Admit Date:  1/8/2024   Hospital Day: 1      Reason for Consult:        Chief Complaint:  AMS  History Obtained From:  patient, electronic medical record    History of Present Illness:    This is a 73 y.o. female with a significant past medical history of Cerebrovascular accident, atrial fibrillation status post CABG and recent PCI to left circumflex November 2023,  combined diastolic and systolic heart failure, essential hypertension, partial deafness and  ESRD on hemodialysis Tuesday Thursday Saturday at [facility] dialysis unit under my care.Patient had a recent Hospitalization for UTI/PNA and was discharged on 1/6/24.  Patient came back again to the hospital on 1/8/24 with c/o altered mental status, restlessness, confusion.  UA moderate Hb, small L.E  CT head No acute abnormality.      Past Medical History:        Diagnosis Date    Acute CVA (cerebrovascular accident) ([facility]) 07/25/2020    Acute kidney injury superimposed on CKD ([facility]) 06/08/2022    Acute on chronic combined systolic (congestive) and diastolic (congestive) heart failure ([facility]) 02/06/2022    Acute respiratory failure with hypoxia and hypercapnia ([facility])     JOY (acute kidney injury) ([facility]) 01/15/2022    Arthritis     Asthma     Atrial fibrillation, unspecified type ([facility])     Bilateral lower extremity edema 04/20/2022    Bilateral pleural effusion 11/20/2023    Bradycardia 06/12/2022    Chronic diastolic congestive heart failure ([facility]) 02/20/2020    Chronic ulcer of left leg with fat layer exposed ([facility]) 06/21/2022    CKD stage 4 secondary to hypertension ([facility]) 02/20/2020    Dependence on renal dialysis ([facility])     Tues, Th, Sat    ESRD (end stage renal disease) ([facility])     Essential hypertension 07/25/2020    Gastrointestinal hemorrhage 07/20/2022    Gout     Hallucinations 02/18/2021    Hard of hearing     Head contusion 09/09/2020

## 2024-01-09 NOTE — PROGRESS NOTES
Rn notified Dr. Daniel of Pt in Afib fluctuating -130s. Pt not new to Afib, no new orders at this time.

## 2024-01-09 NOTE — PROGRESS NOTES
HEMODIALYSIS POST TREATMENT NOTE    Treatment time ordered: 3Hrs    Actual treatment time: 3Hrs    UltraFiltration Goal: 2Kgs  UltraFiltration Removed: 2Kgs      Pre Treatment weight: 61.6Kgs  Post Treatment weight: 59.5Kgs  Estimated Dry Weight: Unknown at this time    Access used:     Central Venous Catheter:          Tunneled or Non-tunneled: Tunneled           Site: R Chest          Access Flow: Good      Internal Access:       AV Fistula or AV Graft: N/A         Site: N/A       Access Flow: N/A       Sign and symptoms of infection: No       If YES: N/A    Medications or blood products given: See MAR    Chronic outpatient schedule: [facility]    Chronic outpatient unit: [facility]    Summary of response to treatment: Tolerated fairly well with much confusion throughout trx. 2kGs of fluid removed.    Explain if orders NOT met, was physician notified:N/A      ACES flowsheet faxed to patient unit/ placed in patient chart: Yes    Post assessment completed: Yes by Marivel Lynch RN    Report given to: Estela      * Intra-treatment documented Safety Checks include the followin) Access and face visible at all times.     2) All connections and blood lines are secure with no kinks.     3) NVL alarm engaged.     4) Hemosafe device applied (if applicable).     5) No collapse of Arterial or Venous blood chambers.     6) All blood lines / pump segments in the air detectors.

## 2024-01-09 NOTE — CONSULTS
[facility]    Consult                        Today's Date: 1/9/2024  Patient Name: Lupe Ojeda  Date of admission: 1/8/2024  8:24 PM  Patient's age: 73 y.o., 1950  Admission Dx: Altered mental status [R41.82]  Altered mental status, unspecified altered mental status type [R41.82]    Reason for Consult:  Cardiac evaluation    Requesting Physician: Ludwig Daniel MD    CHIEF COMPLAINT:  AMS    History Obtained From:  family member - Daughter, electronic medical record    HISTORY OF PRESENT ILLNESS:      The patient is a 73 y.o.  female who is admitted to the hospital for AMS. Cardio consulted for hypotension and Afib.    The patient is a 73 y.o.  Non- / non  female who presents with Altered Mental Status (Worsening today )   and she is admitted to the hospital for the management of altered mental status  Patient has past medical history of multiple medical problem which include coronary artery disease s/p CABG, heart failure with reduced ejection fraction, history of stroke, history of hematuria, history of A-fib not on anticoagulation due to bleeding risk, COPD, ESRD on hemodialysis, bilateral cataract, hearing disability  Patient was recently discharged from the hospital only few days ago when she was admitted with altered mental status, underwent CT head suggestive of meningioma with mass effect, patient also underwent EEG suggestive of sharp waves concerning for possible seizures, started on antiepileptics  Patient family did not want to get her discharged to SNF  Patient eventually discharged home  Patient presented again with increasing confusion, as per daughter at bedside, patient was talking gibberish,  She was started on Seroquel which was discontinued outpatient due to side effect  Denying complaints of chest pain, shortness of breath  Patient is compliant with her diet medication  Last admission was planned to get MRI done which unfortunately could not be  55%. No WMA. Moderate LVH. Normal RV size and function. LA dilatation. AV trileaflet. Mild AS. Mild-moderate AI. Normal Aortic root dimension. MAC Mild MR Mild TR. Normal RVSP. No PCE. Pleural effusion present.     12. End-stage renal disease on dialysis    Labs:     CBC:   Recent Labs     01/08/24 2200 01/09/24  0851   WBC 8.4 6.8   HGB 12.5 11.5*   HCT 39.5 36.4   * 135*     BMP:   Recent Labs     01/08/24 2200 01/09/24  0851    141   K 4.3 3.9   CO2 24 24   BUN 30* 32*   CREATININE 4.5* 4.9*   LABGLOM 10* 9*   GLUCOSE 198* 156*     BNP: No results for input(s): \"BNP\" in the last 72 hours.  PT/INR: No results for input(s): \"PROTIME\", \"INR\" in the last 72 hours.  APTT:No results for input(s): \"APTT\" in the last 72 hours.  CARDIAC ENZYMES:No results for input(s): \"CKTOTAL\", \"CKMB\", \"CKMBINDEX\", \"TROPONINI\" in the last 72 hours.  FASTING LIPID PANEL:  Lab Results   Component Value Date/Time    HDL 46 09/19/2022 05:40 AM    TRIG 144 09/19/2022 05:40 AM     LIVER PROFILE:  Recent Labs     01/08/24 2200   AST 25   ALT 18   LABALBU 3.6       IMPRESSION:    Patient Active Problem List   Diagnosis    Vitamin D deficiency    Venous insufficiency of both lower extremities    Type 2 diabetes mellitus with chronic kidney disease on chronic dialysis, without long-term current use of insulin (HCC)    Atherosclerosis of coronary artery bypass graft of native heart without angina pectoris    Hypertension, essential    Iron deficiency anemia    Colonoscopy refused    Bilateral hearing loss    COPD (chronic obstructive pulmonary disease) (HCC)    Gout with tophi    Dyslipidemia    Metabolic encephalopathy    E. coli UTI    Meningioma (HCC)    Kidney stones    Altered mental status    Delirium due to another medical condition    Diverticulosis    COVID-19 vaccination declined    Chronic venous stasis dermatitis of both lower extremities    Family history of colon cancer    Family history of pancreatic cancer    Mild  malnutrition (HCC)    Decreased strength, endurance, and mobility    Lymphedema    PVD (peripheral vascular disease) (HCC)    Elevated troponin    ESRD on dialysis (HCC)    Hypertensive heart and chronic kidney disease with heart failure and with stage 5 chronic kidney disease, or end stage renal disease (HCC)    Gastroesophageal reflux disease    Moderate anxiety    Other age-related cataract    History of GI bleed    Chronic combined systolic and diastolic CHF (congestive heart failure) (HCC)    Degenerative disease of nervous system, unspecified (HCC)    Allergic rhinitis    Acquired hypothyroidism    Type 2 diabetes mellitus with polyneuropathy (HCC)    Chronic respiratory failure with hypoxia (HCC)    Other osteoporosis without current pathological fracture    Moderate vascular dementia with anxiety (HCC)    Recurrent falls while walking    Persistent atrial fibrillation (HCC)    Slow transit constipation    Right temporal lobe mass    History of type 2 diabetes mellitus    History of chronic atrial fibrillation    Acute encephalopathy    Bacteremia    Actinomyces infection    ESRD (end stage renal disease) on dialysis (HCC)    Other seizures    Pressure ulcer of coccygeal region, stage III (HCC)    Bilateral pleural effusion    CAD S/P percutaneous coronary angioplasty    H/O heart artery stent    Abnormal echocardiogram    Mixed hyperlipidemia    Sepsis due to urinary tract infection (HCC)    Renovascular hypertension    Goals of care, counseling/discussion    DNR (do not resuscitate) discussion    ACP (advance care planning)    Palliative care encounter    Acute cystitis with hematuria     Afib with RVR, permanent and not on AC d/t bleeding   CAD/MVD s/p CABG with PCI to Lcx 11/2023  Known ICMP with LVEF 35% on MUGA 12/2023  HTN  HLP  AMS   ESRD on HD  COPD    RECOMMENDATIONS:  Stable. Has perm Afib currently with rates 80-110s. Continue PO Amio and BB with parameters. BP on low side, asymptomatic. Continue

## 2024-01-09 NOTE — CONSULTS
[facility] Neurology   IN-PATIENT SERVICE      NEUROLOGY CONSULT  NOTE            Date:   1/9/2024  Patient name:  Lupe Ojeda  Date of admission:  1/8/2024  YOB: 1950      Chief Complaint:     Chief Complaint   Patient presents with    Altered Mental Status     Worsening today        Reason for Consult:      Confusion, hallucinations, bizarre behaviors    History of Present Illness:     The patient is a 73 y.o. female who presents with Altered Mental Status (Worsening today )  . The patient was seen and examined and the chart was reviewed.  Patient presents with worsening bizarre behaviors, agitation, nonsensical speech over the last few days since being discharged home just a few days ago from the hospital.  Daughter is at bedside providing much of the history.    Patient seen last admission on 12/31 by neurology in which she had come into the hospital for confusion found to have UTI, pneumonia, A-fib RVR.  Noted to have hyperactive delirium, nonsensical speech and hallucinations.  Was noted to have a gradual decline in her cognitive impairment over the last 1 year or so.  In addition findings of right temporal meningioma which was known.  She is on antiepileptics due to concern for seizure risk in the past although no abnormal EEGs in the past.  Updated EEG was done during the admission with results of photoparoxysmal response due to photosensitivity versus epileptic spike and wave discharges in the right and left temporal occipital region.  MRI cannot be done due to inability to lie still.  Lamictal was ordered but that it was switched to Vimpat after she was done.  There was some mild improvement in her overall mentation and eventually was discharged home on 1/5.    At this time patient is significantly agitated, despite daughter being at bedside.  She is shouting out bizarre and nonsensical phrases repeatedly.  Multiple prompts taken to get her attention and eventually does follow some basic  End Date Taking? Authorizing Provider   lacosamide (VIMPAT) 100 MG TABS tablet Take 1 tablet by mouth 2 times daily for 60 days. Max Daily Amount: 200 mg 1/5/24 3/5/24 Yes Melyssa Marshall MD   furosemide (LASIX) 80 MG tablet Take 1 tablet by mouth every other day Water pill, does not take on dialysis days. 12/11/23  Yes Kayleigh Cardoso MD   amiodarone (PACERONE) 100 MG tablet Take 1 tablet by mouth every morning 12/11/23  Yes Kayleigh Cardoso MD   aspirin 81 MG EC tablet Take 1 tablet by mouth daily Always take with food, stop taking it if any black stools or rectal bleeding 12/11/23  Yes Kayleigh Cardoso MD   clopidogrel (PLAVIX) 75 MG tablet Take 1 tablet by mouth daily 11/29/23  Yes Park Pereira APRN - CNP   desloratadine (CLARINEX) 5 MG tablet Take 1 tablet by mouth daily 10/22/23  Yes ProviderMino MD   ketorolac (ACULAR) 0.5 % ophthalmic solution INSTILL 1 DROP INTO AFFECTED EYE 4 TIMES DAILY STARTING 1 DAY BEFORE SURGERY 10/27/23  Yes Mino Berry MD   moxifloxacin (VIGAMOX) 0.5 % ophthalmic solution INSTILL 1 DROP INTO AFFECTED EYE 4 TIMES DAILY STARTING 1 DAY BEFORE SURGERY 10/27/23  Yes ProviderMino MD   prednisoLONE acetate (PRED FORTE) 1 % ophthalmic suspension INSTILL 1 DROP INTO AFFECTED EYE 4 TIMES DAILY STARTING 1 DAY BEFORE SURGERY 10/27/23  Yes ProviderMino MD   mirtazapine (REMERON) 15 MG tablet Take 1 tablet by mouth nightly Dose increased 11/17/2023 . 30 days RX till the mail order comes 11/17/23  Yes Kayleigh Cardoso MD   mupirocin (BACTROBAN) 2 % ointment Apply topically 3 times daily x 10 days. 11/17/23  Yes Kayleigh Cardoso MD   famotidine (PEPCID) 20 MG tablet Take 1 tablet by mouth every morning (before breakfast) Replacing omeprazole due to end-stage kidney disease 10/27/23  Yes Kayleigh Cardoso MD   lamoTRIgine (LAMICTAL) 25 MG tablet Take 1 tablet by mouth 2 times daily 10/25/23  Yes Alyssa Darnell MD   atorvastatin

## 2024-01-09 NOTE — CARE COORDINATION
Case Management Assessment  Initial Evaluation    Date/Time of Evaluation: 1/9/2024 1:55 PM  Assessment Completed by: Jaquelin Lee RN    If patient is discharged prior to next notation, then this note serves as note for discharge by case management.    Patient Name: Lupe Nelson                   YOB: 1950  Diagnosis: Altered mental status [R41.82]  Altered mental status, unspecified altered mental status type [R41.82]                   Date / Time: 1/8/2024  8:24 PM    Patient Admission Status: Inpatient   Readmission Risk (Low < 19, Mod (19-27), High > 27): Readmission Risk Score: 26.8    Current PCP: Kayleigh Cardoso MD  PCP verified by CM?       Chart Reviewed: Yes      History Provided by: Child/Family  Patient Orientation: Alert and Oriented    Patient Cognition: Alert     Hospitalization in the last 30 days (Readmission):  No    If yes, Readmission Assessment in CM Navigator will be completed.     Advance Directives:       Code Status: Full Code   Patient's Primary Decision Maker is: Legal Next of Kin    Primary Decision Maker: Sea Nelson - Child - [phone]    Primary Decision Maker: CYRIL NELSON - Child - [phone]    Primary Decision Maker: AZALEA NELSON - Child - [phone]    Primary Decision Maker: GiuseppememeKimberly - Child - [phone]     Discharge Planning:     Patient lives with:   Type of Home: House  Primary Care Giver: Family  Patient Support Systems include: Children, Family Members   Current Financial resources: Medicare,  ([place])  Current community resources: Other (Comment) (Deysi KESSLER on [address] @ [address])  Current services prior to admission: Durable Medical Equipment, Oxygen Therapy, Other (Comment) ([facility] KESSLER @ [address])            Current DME: Walker, Wheelchair, Home Aerosol, Bedside Commode, Oxygen Therapy (Comment), Cane            Type of Home Care services:  None     ADLS  Prior functional level: Assistance with the following:, Bathing, Shopping,  Housework, Cooking, Dressing, Mobility  Current functional level:       PT AM-PAC: 8 /24  OT AM-PAC: 12 /24     Family can provide assistance at DC: Yes  Would you like Case Management to discuss the discharge plan with any other family members/significant others, and if so, who? Yes  Plans to Return to Present Housing: Yes  Other Identified Issues/Barriers to RETURNING to current housing: no barriers  Potential Assistance needed at discharge: N/A            Potential DME:  no  Patient expects to discharge to: House  Plan for transportation at discharge: Family     Financial     Payor: MEDICARE / Plan: MEDICARE PART A AND B / Product Type: *No Product type* /      Does insurance require precert for SNF: No     Potential assistance Purchasing Medications:    Meds-to-Beds request:  no        [facility] [place] - [address] [facility] - P [phone] - F [phone]  [facility] [facility]  [place] [address]  Phone: [phone] Fax: [phone]     [address] [phone] - F [phone]  [facility] [address]  Phone: [phone] Fax: [phone]        Notes:     Factors facilitating achievement of predicted outcomes: Family support, Good insight into deficits, Has needed Durable Medical Equipment at home, and Knowledge about rehab     Barriers to discharge: Long standing deficits, Impaired vision, and Medical complications     Additional Case Management Notes: 1/9/2024 Medicare/[facility]; from home w/ son, needs care; DME transport WC, BSC, cane, walker, lift chair, O2 prn ([employer]), nebulizer; VNS denies; Deysi KESSLER on [facility] @ 1130; Pt was just discharged to home Friday 1/5.  Family is still refusing VNS or SNF .//tv          The Plan for Transition of Care is related to the following treatment goals of Altered mental status [R41.82]  Altered mental status, unspecified altered mental status type [R41.82]     IF APPLICABLE: The Patient

## 2024-01-09 NOTE — H&P
[facility]   IN-PATIENT SERVICE   [facility]    HISTORY AND PHYSICAL EXAMINATION            Date:   1/9/2024  Patient name:  Lupe Ojeda  Date of admission:  1/8/2024  8:24 PM  MRN:   [id number]  Account:  [id number]  YOB: 1950  PCP:    Kayleigh Cardoso MD  Room:   [address] [place] [address]  Code Status:    Full Code    Chief Complaint:     Chief Complaint   Patient presents with    Altered Mental Status     Worsening today        History Obtained From:     patient, electronic medical record    History of Present Illness:     The patient is a 73 y.o.  Non- / non  female who presents with Altered Mental Status (Worsening today )   and she is admitted to the hospital for the management of altered mental status  Patient has past medical history of multiple medical problem which include coronary artery disease s/p CABG, heart failure with reduced ejection fraction, history of stroke, history of hematuria, history of A-fib not on anticoagulation due to bleeding risk, COPD, ESRD on hemodialysis, bilateral cataract, hearing disability  Patient was recently discharged from the hospital only few days ago when she was admitted with altered mental status, underwent CT head suggestive of meningioma with mass effect, patient also underwent EEG suggestive of sharp waves concerning for possible seizures, started on antiepileptics  Patient family did not want to get her discharged to SNF  Patient eventually discharged home  Patient presented again with increasing confusion, as per daughter at bedside, patient was talking gibberish,  She was started on Seroquel which was discontinued outpatient due to side effect  Denying complaints of chest pain, shortness of breath  Patient is compliant with her diet medication  Last admission was planned to get MRI done which unfortunately could not be done because of patient agitation        Past Medical History:     Past Medical History:  200 mg 1/5/24 3/5/24 Yes Melyssa Marshall MD   furosemide (LASIX) 80 MG tablet Take 1 tablet by mouth every other day Water pill, does not take on dialysis days. 12/11/23  Yes Kayleigh Cardoso MD   amiodarone (PACERONE) 100 MG tablet Take 1 tablet by mouth every morning 12/11/23  Yes Kayleigh Cardoso MD   aspirin 81 MG EC tablet Take 1 tablet by mouth daily Always take with food, stop taking it if any black stools or rectal bleeding 12/11/23  Yes Kayleigh Cardoso MD   clopidogrel (PLAVIX) 75 MG tablet Take 1 tablet by mouth daily 11/29/23  Yes Park Pereira APRN - CNP   desloratadine (CLARINEX) 5 MG tablet Take 1 tablet by mouth daily 10/22/23  Yes Mino Berry MD   ketorolac (ACULAR) 0.5 % ophthalmic solution INSTILL 1 DROP INTO AFFECTED EYE 4 TIMES DAILY STARTING 1 DAY BEFORE SURGERY 10/27/23  Yes Mino Berry MD   moxifloxacin (VIGAMOX) 0.5 % ophthalmic solution INSTILL 1 DROP INTO AFFECTED EYE 4 TIMES DAILY STARTING 1 DAY BEFORE SURGERY 10/27/23  Yes Mino Berry MD   prednisoLONE acetate (PRED FORTE) 1 % ophthalmic suspension INSTILL 1 DROP INTO AFFECTED EYE 4 TIMES DAILY STARTING 1 DAY BEFORE SURGERY 10/27/23  Yes Mino Berry MD   mirtazapine (REMERON) 15 MG tablet Take 1 tablet by mouth nightly Dose increased 11/17/2023 . 30 days RX till the mail order comes 11/17/23  Yes Kayleigh Cardoso MD   mupirocin (BACTROBAN) 2 % ointment Apply topically 3 times daily x 10 days. 11/17/23  Yes Kayleigh Cardoso MD   famotidine (PEPCID) 20 MG tablet Take 1 tablet by mouth every morning (before breakfast) Replacing omeprazole due to end-stage kidney disease 10/27/23  Yes Kayleigh Cardoso MD   lamoTRIgine (LAMICTAL) 25 MG tablet Take 1 tablet by mouth 2 times daily 10/25/23  Yes Alyssa Darnell MD   atorvastatin (LIPITOR) 40 MG tablet Take 1 tablet by mouth every evening Take 1 tablet by mouth once daily 10/23/23  Yes Kayleigh Cardoso MD   melatonin 5 MG

## 2024-01-09 NOTE — PROGRESS NOTES
HEMODIALYSIS PRE-TREATMENT NOTE    Patient Identifiers prior to treatment: Name//MRN    Isolation Required: No                      Isolation Type: N/A       (please document if patient is being managed as a PUI/COVID-19 patient)        Hepatitis status:                           Date Drawn                             Result  Hepatitis B Surface Antigen 2024     NEG                     Hepatitis B Surface Antibody N/A N/A        Hepatitis B Core Antibody N/A N/A          How was Hepatitis Status verified: Epic chart     Was a copy of the labs you documented provided to facility for the patient's chart: Yes    Hemodialysis orders verified: Yes    Access Within normal limits ( I.e. s/s of infection,...): WNL     Pre-Assessment completed: Yes by Marivel Lynch RN    Pre-dialysis report received from: Estela Julien RN                      Time: 11:40

## 2024-01-09 NOTE — PROGRESS NOTES
[facility] Internal Medicine  Masoud Blackwood MD; Kirt Berry MD; Randy Silva MD; MD Saritha Patel MD; Jackelyn Eugene MD  [facility] Internal Medicine   IN-PATIENT SERVICE  [facility]     HISTORY AND PHYSICAL EXAMINATION            Date:   1/9/2024  Patientname:  Lupe Ojeda  Date of admission:  1/8/2024  8:24 PM  MRN:   [id number]  Account:  [id number]  YOB: 1950  PCP:    Kayleigh Cardoso MD  Room:   2106/2106-01  Code Status:    Full Code      Chief Complaint:     Chief Complaint   Patient presents with    Altered Mental Status     Worsening today        History Obtained From:     patient    History of Present Illness:     Lupe Ojeda is a 73 y.o. Non- / non  female who presents with Altered Mental Status (Worsening today )   and is admitted to the hospital for the management of Altered mental status.    According to the patient's family she has become increasingly confused.  She was recently hospitalized for similar reasons and it was found that she was having seizures.  She was started on on Seroquel, but it had to be discontinued due to hypertension during dialysis.  The patient has a history of brain mass and needed to have MRI done.  However this was not possible due to severe agitation.  Patient's family request that if a repeat MRI is performed that one of them be present.    Past Medical History:     Past Medical History:   Diagnosis Date    Acute CVA (cerebrovascular accident) ([facility]) 07/25/2020    Acute kidney injury superimposed on CKD ([facility]) 06/08/2022    Acute on chronic combined systolic (congestive) and diastolic (congestive) heart failure ([facility]) 02/06/2022    Acute respiratory failure with hypoxia and hypercapnia ([facility])     JOY (acute kidney injury) ([facility]) 01/15/2022    Arthritis     Asthma     Atrial fibrillation, unspecified type ([facility])     Bilateral lower extremity edema 04/20/2022    Bilateral pleural  63.4 kg (139 lb 12.4 oz)   SpO2 93%   BMI 27.30 kg/m²  Body mass index is 27.3 kg/m².      Physical Exam  Constitutional:       Appearance: She is ill-appearing.   HENT:      Head: Atraumatic.      Right Ear: External ear normal.      Left Ear: External ear normal.      Nose: Nose normal.      Mouth/Throat:      Mouth: Mucous membranes are moist.   Eyes:      Pupils: Pupils are equal, round, and reactive to light.   Cardiovascular:      Rate and Rhythm: Regular rhythm.      Pulses: Normal pulses.   Pulmonary:      Effort: Pulmonary effort is normal.   Abdominal:      Palpations: Abdomen is soft.   Skin:     General: Skin is warm and dry.         Investigations:      Laboratory Testing:  Recent Results (from the past 24 hour(s))   EKG 12 Lead    Collection Time: 01/08/24  9:35 PM   Result Value Ref Range    Ventricular Rate 116 BPM    Atrial Rate 115 BPM    QRS Duration 98 ms    Q-T Interval 344 ms    QTc Calculation (Bazett) 478 ms    R Axis -7 degrees    T Axis 164 degrees   CBC with Auto Differential    Collection Time: 01/08/24 10:00 PM   Result Value Ref Range    WBC 8.4 3.5 - 11.0 k/uL    RBC 4.27 4.0 - 5.2 m/uL    Hemoglobin 12.5 12.0 - 16.0 g/dL    Hematocrit 39.5 36 - 46 %    MCV 92.5 80 - 100 fL    MCH 29.3 26 - 34 pg    MCHC 31.7 31 - 37 g/dL    RDW 18.0 (H) 11.5 - 14.9 %    Platelets 140 (L) 150 - 450 k/uL    MPV 7.8 6.0 - 12.0 fL    Neutrophils % 90 (H) 36 - 66 %    Lymphocytes % 3 (L) 24 - 44 %    Monocytes % 5 1 - 7 %    Eosinophils % 1 0 - 4 %    Basophils % 1 0 - 2 %    Neutrophils Absolute 7.57 1.3 - 9.1 k/uL    Lymphocytes Absolute 0.25 (L) 1.0 - 4.8 k/uL    Monocytes Absolute 0.42 0.1 - 1.3 k/uL    Eosinophils Absolute 0.08 0.0 - 0.4 k/uL    Basophils Absolute 0.08 0.0 - 0.2 k/uL    Morphology ANISOCYTOSIS PRESENT     Morphology 1+ ECHINOCYTES     Morphology 1+ TEARDROPS     Morphology 1+ ELLIPTOCYTES     Morphology 1+ TARGET CELLS    CMP    Collection Time: 01/08/24 10:00 PM   Result Value Ref

## 2024-01-09 NOTE — PROGRESS NOTES
Nephrology ESRD Progress Note    Reason for Consult:  Management of hemodialysis dependent End stage renal disease    Requesting Physician: Claudia Stauffer MD    Interval History:   Patient was seen and examined today during dialysis she remains generally confused and restless.    She is tolerating dialysis.    History of Present Illness:  This is a 73 y.o. female with a significant past medical history of Cerebrovascular accident, atrial fibrillation status post CABG and recent PCI to left circumflex November 2023,  combined diastolic and systolic heart failure, essential hypertension, partial deafness and  ESRD on hemodialysis Tuesday Thursday Saturday at [facility] dialysis unit under Dr Novak, who presents to the ER yesterday with confusion, altered mental status and gross hematuria.  Family reported small amount of hematuria passed by the patient last few days -no fever or flank pain observed.Urinalysis showed leukocyte Estrace, hematuria and WBCs but urine culture is negative.  Chest x-ray showed evidence of diffuse bilateral pulmonary opacities concerning for edema infection and a CT abdomen pelvis showed nonobstructive right renal calculi without hydronephrosis or stranding.  She is empirically started on antibiotics for possible pneumonia.  Laboratory studies showed a WBC of 17, hemoglobin 13.1 with BUN/creatinine of 18 and 2.9 and potassium 4.7.  Troponin was 113.  Patient is seen and examined during dialysis-patient  is confused not oriented to place or time or name.  She has a tacky- arrhythmia up to the 140s.  No chest pain.    Current Medications:    albuterol (PROVENTIL) (2.5 MG/3ML) 0.083% nebulizer solution 2.5 mg, Q6H PRN  albuterol sulfate HFA (PROVENTIL;VENTOLIN;PROAIR) 108 (90 Base) MCG/ACT inhaler 2 puff, Q6H PRN  amiodarone (CORDARONE) tablet 100 mg, QAM  aspirin EC tablet 81 mg, Daily  atorvastatin (LIPITOR) tablet 40 mg, QPM  clopidogrel (PLAVIX) tablet 75 mg, Daily  famotidine (PEPCID) tablet 10

## 2024-01-09 NOTE — PLAN OF CARE
Problem: Discharge Planning  Goal: Discharge to home or other facility with appropriate resources  Outcome: Progressing     Problem: Skin/Tissue Integrity  Goal: Absence of new skin breakdown  Description: 1.  Monitor for areas of redness and/or skin breakdown  2.  Assess vascular access sites hourly  3.  Every 4-6 hours minimum:  Change oxygen saturation probe site  4.  Every 4-6 hours:  If on nasal continuous positive airway pressure, respiratory therapy assess nares and determine need for appliance change or resting period.  Outcome: Progressing     Problem: Safety - Adult  Goal: Free from fall injury  Outcome: Progressing     Problem: Confusion  Goal: Confusion, delirium, dementia, or psychosis is improved or at baseline  Description: INTERVENTIONS:  1. Assess for possible contributors to thought disturbance, including medications, impaired vision or hearing, underlying metabolic abnormalities, dehydration, psychiatric diagnoses, and notify attending LIP  2. [place] high risk fall precautions, as indicated  3. Provide frequent short contacts to provide reality reorientation, refocusing and direction  4. Decrease environmental stimuli, including noise as appropriate  5. Monitor and intervene to maintain adequate nutrition, hydration, elimination, sleep and activity  6. If unable to ensure safety without constant attention obtain sitter and review sitter guidelines with assigned personnel  7. Initiate Psychosocial CNS and Spiritual Care consult, as indicated  Outcome: Progressing     Problem: Pain  Goal: Verbalizes/displays adequate comfort level or baseline comfort level  Outcome: Progressing

## 2024-01-10 PROBLEM — Z87.448 HISTORY OF END STAGE RENAL DISEASE: Status: ACTIVE | Noted: 2024-01-10

## 2024-01-10 LAB
ANION GAP SERPL CALCULATED.3IONS-SCNC: 13 MMOL/L (ref 9–17)
BASOPHILS # BLD: 0.04 K/UL (ref 0–0.2)
BASOPHILS NFR BLD: 1 % (ref 0–2)
BUN SERPL-MCNC: 14 MG/DL (ref 8–23)
CALCIUM SERPL-MCNC: 8.5 MG/DL (ref 8.6–10.4)
CHLORIDE SERPL-SCNC: 97 MMOL/L (ref 98–107)
CO2 SERPL-SCNC: 25 MMOL/L (ref 20–31)
CREAT SERPL-MCNC: 2.7 MG/DL (ref 0.5–0.9)
EOSINOPHIL # BLD: 0 K/UL (ref 0–0.4)
EOSINOPHILS RELATIVE PERCENT: 0 % (ref 0–4)
ERYTHROCYTE [DISTWIDTH] IN BLOOD BY AUTOMATED COUNT: 17.7 % (ref 11.5–14.9)
GFR SERPL CREATININE-BSD FRML MDRD: 18 ML/MIN/1.73M2
GLUCOSE SERPL-MCNC: 167 MG/DL (ref 70–99)
HCT VFR BLD AUTO: 35.1 % (ref 36–46)
HGB BLD-MCNC: 11.2 G/DL (ref 12–16)
LYMPHOCYTES NFR BLD: 0.38 K/UL (ref 1–4.8)
LYMPHOCYTES RELATIVE PERCENT: 10 % (ref 24–44)
MCH RBC QN AUTO: 29.4 PG (ref 26–34)
MCHC RBC AUTO-ENTMCNC: 31.8 G/DL (ref 31–37)
MCV RBC AUTO: 92.2 FL (ref 80–100)
MICROORGANISM SPEC CULT: NO GROWTH
MONOCYTES NFR BLD: 0.23 K/UL (ref 0.1–1.3)
MONOCYTES NFR BLD: 6 % (ref 1–7)
MORPHOLOGY: ABNORMAL
NEUTROPHILS NFR BLD: 83 % (ref 36–66)
NEUTS SEG NFR BLD: 3.15 K/UL (ref 1.3–9.1)
PLATELET # BLD AUTO: 108 K/UL (ref 150–450)
PMV BLD AUTO: 8.2 FL (ref 6–12)
POTASSIUM SERPL-SCNC: 4.1 MMOL/L (ref 3.7–5.3)
RBC # BLD AUTO: 3.8 M/UL (ref 4–5.2)
SODIUM SERPL-SCNC: 135 MMOL/L (ref 135–144)
SPECIMEN DESCRIPTION: NORMAL
WBC OTHER # BLD: 3.8 K/UL (ref 3.5–11)

## 2024-01-10 PROCEDURE — 6360000002 HC RX W HCPCS: Performed by: NURSE PRACTITIONER

## 2024-01-10 PROCEDURE — 99233 SBSQ HOSP IP/OBS HIGH 50: CPT | Performed by: INTERNAL MEDICINE

## 2024-01-10 PROCEDURE — 6360000002 HC RX W HCPCS: Performed by: INTERNAL MEDICINE

## 2024-01-10 PROCEDURE — 99222 1ST HOSP IP/OBS MODERATE 55: CPT | Performed by: NURSE PRACTITIONER

## 2024-01-10 PROCEDURE — 99232 SBSQ HOSP IP/OBS MODERATE 35: CPT | Performed by: PSYCHIATRY & NEUROLOGY

## 2024-01-10 PROCEDURE — 2500000003 HC RX 250 WO HCPCS: Performed by: INTERNAL MEDICINE

## 2024-01-10 PROCEDURE — 2060000000 HC ICU INTERMEDIATE R&B

## 2024-01-10 PROCEDURE — 99223 1ST HOSP IP/OBS HIGH 75: CPT | Performed by: SURGERY

## 2024-01-10 PROCEDURE — 6370000000 HC RX 637 (ALT 250 FOR IP): Performed by: INTERNAL MEDICINE

## 2024-01-10 PROCEDURE — C9254 INJECTION, LACOSAMIDE: HCPCS | Performed by: NURSE PRACTITIONER

## 2024-01-10 PROCEDURE — 94640 AIRWAY INHALATION TREATMENT: CPT

## 2024-01-10 PROCEDURE — 85025 COMPLETE CBC W/AUTO DIFF WBC: CPT

## 2024-01-10 PROCEDURE — 2580000003 HC RX 258

## 2024-01-10 PROCEDURE — 36415 COLL VENOUS BLD VENIPUNCTURE: CPT

## 2024-01-10 PROCEDURE — 6370000000 HC RX 637 (ALT 250 FOR IP)

## 2024-01-10 PROCEDURE — 99222 1ST HOSP IP/OBS MODERATE 55: CPT | Performed by: PSYCHIATRY & NEUROLOGY

## 2024-01-10 PROCEDURE — 2580000003 HC RX 258: Performed by: NURSE PRACTITIONER

## 2024-01-10 PROCEDURE — 6360000002 HC RX W HCPCS

## 2024-01-10 PROCEDURE — 80048 BASIC METABOLIC PNL TOTAL CA: CPT

## 2024-01-10 RX ORDER — QUETIAPINE FUMARATE 50 MG/1
25 TABLET, FILM COATED ORAL NIGHTLY
Status: DISCONTINUED | OUTPATIENT
Start: 2024-01-10 | End: 2024-01-10

## 2024-01-10 RX ORDER — RISPERIDONE 0.5 MG/1
0.5 TABLET, ORALLY DISINTEGRATING ORAL 2 TIMES DAILY
Status: DISCONTINUED | OUTPATIENT
Start: 2024-01-10 | End: 2024-01-11

## 2024-01-10 RX ORDER — HALOPERIDOL 5 MG/ML
5 INJECTION INTRAMUSCULAR ONCE
Status: COMPLETED | OUTPATIENT
Start: 2024-01-10 | End: 2024-01-11

## 2024-01-10 RX ORDER — LORAZEPAM 2 MG/ML
2 INJECTION INTRAMUSCULAR
Status: ACTIVE | OUTPATIENT
Start: 2024-01-10 | End: 2024-01-11

## 2024-01-10 RX ORDER — HYDRALAZINE HYDROCHLORIDE 20 MG/ML
10 INJECTION INTRAMUSCULAR; INTRAVENOUS EVERY 6 HOURS PRN
Status: DISCONTINUED | OUTPATIENT
Start: 2024-01-10 | End: 2024-01-16 | Stop reason: HOSPADM

## 2024-01-10 RX ADMIN — HEPARIN SODIUM 5000 UNITS: 5000 INJECTION INTRAVENOUS; SUBCUTANEOUS at 06:09

## 2024-01-10 RX ADMIN — DEXAMETHASONE SODIUM PHOSPHATE 2 MG: 4 INJECTION INTRA-ARTICULAR; INTRALESIONAL; INTRAMUSCULAR; INTRAVENOUS; SOFT TISSUE at 22:45

## 2024-01-10 RX ADMIN — POLYETHYLENE GLYCOL 3350 17 G: 17 POWDER, FOR SOLUTION ORAL at 09:39

## 2024-01-10 RX ADMIN — ANTI-FUNGAL POWDER MICONAZOLE NITRATE TALC FREE: 1.42 POWDER TOPICAL at 09:40

## 2024-01-10 RX ADMIN — CLOPIDOGREL BISULFATE 75 MG: 75 TABLET ORAL at 09:39

## 2024-01-10 RX ADMIN — MUPIROCIN: 20 OINTMENT TOPICAL at 09:40

## 2024-01-10 RX ADMIN — LACOSAMIDE 100 MG: 10 INJECTION INTRAVENOUS at 22:42

## 2024-01-10 RX ADMIN — FAMOTIDINE 10 MG: 20 TABLET, FILM COATED ORAL at 06:09

## 2024-01-10 RX ADMIN — SODIUM CHLORIDE, PRESERVATIVE FREE 10 ML: 5 INJECTION INTRAVENOUS at 22:00

## 2024-01-10 RX ADMIN — PIPERACILLIN AND TAZOBACTAM 3375 MG: 3; .375 INJECTION, POWDER, FOR SOLUTION INTRAVENOUS at 00:40

## 2024-01-10 RX ADMIN — DEXAMETHASONE SODIUM PHOSPHATE 2 MG: 4 INJECTION INTRA-ARTICULAR; INTRALESIONAL; INTRAMUSCULAR; INTRAVENOUS; SOFT TISSUE at 15:15

## 2024-01-10 RX ADMIN — BUDESONIDE AND FORMOTEROL FUMARATE DIHYDRATE 2 PUFF: 160; 4.5 AEROSOL RESPIRATORY (INHALATION) at 21:15

## 2024-01-10 RX ADMIN — AMIODARONE HYDROCHLORIDE 100 MG: 200 TABLET ORAL at 09:39

## 2024-01-10 RX ADMIN — DEXMEDETOMIDINE HYDROCHLORIDE 1.4 MCG/KG/HR: 400 INJECTION INTRAVENOUS at 15:14

## 2024-01-10 RX ADMIN — HYDRALAZINE HYDROCHLORIDE 10 MG: 20 INJECTION INTRAMUSCULAR; INTRAVENOUS at 16:55

## 2024-01-10 RX ADMIN — ASPIRIN 81 MG: 81 TABLET, COATED ORAL at 09:39

## 2024-01-10 RX ADMIN — HEPARIN SODIUM 5000 UNITS: 5000 INJECTION INTRAVENOUS; SUBCUTANEOUS at 15:15

## 2024-01-10 RX ADMIN — DEXAMETHASONE SODIUM PHOSPHATE 2 MG: 4 INJECTION INTRA-ARTICULAR; INTRALESIONAL; INTRAMUSCULAR; INTRAVENOUS; SOFT TISSUE at 04:26

## 2024-01-10 RX ADMIN — LINEZOLID 600 MG: 2 INJECTION, SOLUTION INTRAVENOUS at 09:45

## 2024-01-10 RX ADMIN — DEXAMETHASONE SODIUM PHOSPHATE 2 MG: 4 INJECTION INTRA-ARTICULAR; INTRALESIONAL; INTRAMUSCULAR; INTRAVENOUS; SOFT TISSUE at 09:39

## 2024-01-10 RX ADMIN — CETIRIZINE HYDROCHLORIDE 5 MG: 10 TABLET, FILM COATED ORAL at 09:39

## 2024-01-10 RX ADMIN — DEXMEDETOMIDINE HYDROCHLORIDE 0.4 MCG/KG/HR: 400 INJECTION INTRAVENOUS at 08:04

## 2024-01-10 RX ADMIN — HEPARIN SODIUM 5000 UNITS: 5000 INJECTION INTRAVENOUS; SUBCUTANEOUS at 22:46

## 2024-01-10 RX ADMIN — LACOSAMIDE 100 MG: 100 TABLET, FILM COATED ORAL at 09:39

## 2024-01-10 RX ADMIN — LEVOTHYROXINE SODIUM 25 MCG: 0.03 TABLET ORAL at 06:09

## 2024-01-10 ASSESSMENT — PAIN SCALES - GENERAL: PAINLEVEL_OUTOF10: 0

## 2024-01-10 NOTE — CONSULTS
Department of Psychiatry  Behavioral Health Consult    REASON FOR CONSULT: agitation    CONSULTING PHYSICIAN: Dr. Daniel    History obtained from: chart, nursing, daughters    HISTORY OF PRESENT ILLNESS:      The patient is a 73 y.o. female with no significant past psychiatric history and pmh of stroke, CAD status post CABG, hypertension, bilateral cataract, ESRD on HD, CHF, chronic hearing loss, history of right temporal meningioma, cognitive impairment/dementia, afib not on AC due to bleeding risk per cardiology, who is currently admitted for altered mentation. Concern for UTI, but final urine culture is negative. Several repeated failed attempts for head MRI as patient could not tolerate laying flat, got more agitated, and was screaming. Psychiatry was consulted for agitation.     Patient seen and evaluated at bedside. Currently sedated on Precedex, marvin Huertas and Svitlana at bedside. According to daughter, the patient began to have hallucinations and delusions about 2-3 months ago. These episodes began to happen about 2 years ago and usually occur when she gets a UTI. They report visual hallucinations and delusions like there are people coming after her, monkeys coming into the kitchen, and that people are stealing items from her. She also reportedly will yell for people, such as her parents, who passed away long ago. Her daughters also say the patient will gets angry quickly, gets \"spicy\", and will hit the wall with her wrist to the point where she bruises. She was discharged on 1/5 after being brought in for a similar condition with altered mentation in the setting of UTI and pneumonia.    The daughters report a history of depression for which she has been taking mirtazapine, along with hydroxyzine PRN for anxiety with dialysis sessions. They also report an episode of hallucinations and delusions that occurred about 20 years ago which resulted in the patient moving to [place], dying her hair black, and trying  Small hiatal hernia.  Scattered colonic diverticula without acute diverticulitis.  No free air.  No dilated loops of bowel or bowel wall thickening within limitations of the exam.  Appendix is normal. Pelvis: The unenhanced uterus and ovaries are unremarkable.  Mild circumferential bladder wall thickening and perivesicular stranding some of which is likely related to underdistention.  Extensive vascular calcifications.  No pathologically enlarged adenopathy or significant free fluid. Peritoneum/Retroperitoneum: The aorta is normal in caliber with severe atherosclerosis.  Stable subcentimeter retroperitoneal lymph nodes.  No obvious pathologically enlarged adenopathy.  No ascites or drainable fluid collection. Bones/Soft Tissues: Mild subcutaneous edema.  Osteopenia.  No acute osseous abnormality.     1. No acute abdominal or pelvic abnormality on this unenhanced exam within the limitations of the exam due to significant motion artifact. 2. Nonobstructing right renal calculus. 3. Gallbladder sludge versus stones. 4. Small bilateral pleural effusions with adjacent atelectasis.     CT HEAD W WO CONTRAST    Result Date: 1/4/2024  EXAMINATION: CT OF THE HEAD WITH AND WITHOUT CONTRAST  1/4/2024 2:22 pm TECHNIQUE: CT of the head/brain was performed without and with the administration of intravenous contrast. Multiplanar reformatted images are provided for review. Automated exposure control, iterative reconstruction, and/or weight based adjustment of the mA/kV was utilized to reduce the radiation dose to as low as reasonably achievable. COMPARISON: CT head performed 12/31/2023. HISTORY: ORDERING SYSTEM PROVIDED HISTORY: AMS TECHNOLOGIST PROVIDED HISTORY: AMS Additional Contrast?->1 Reason for Exam: ams Additional signs and symptoms: Patient uncooperative for exam. Moving and talking throughout exam. Straps used to help limit patient motion. FINDINGS: BRAIN/VENTRICLES: There is no acute hemorrhage or midline shift.  There is  TISSUES/SKULL:  No acute abnormality of the visualized skull or soft tissues.     No acute intracranial abnormality.         DIAGNOSIS:     Dementia   UTI, ESRD on dialysis (last 1/9)   Delirium     RECOMMENDATIONS  Disposition: Per primary team  Risk Management:  fall risk and seizure precautions    Plan:    Reviewed the chart.  oncern for UTI, but final urine culture is negative, antibiotic therapy discontinued.  Currently sedated on Precedex per Neurology recommendation.   Neurology discontinued Lamictal, continued Vimpat for seizure disorder.  On decadron for history of right middle  cranial fossa meningioma, with vasogenic edema.  Seroquel discontinued for concerns of hypotension, consider other antipsychotic therapy.   PRN hydroxyzine for anxiety.  Continue mirtazapine nightly.   Attempt to develop insight and conduct supportive therapy pending patient sedation.  Discussed with daughters at bedside.    Electronically signed by CAITLIN Ellis on 1/10/2024 at 3:00 PM  I independently saw and evaluated the patient.  I reviewed the  documentation by the CNP    .  Any additional comments or changes to the   documentation are stated below otherwise agree with assessment.      The patient is known to me from her recent admissions.  The patient is lethargic and unable to give meaningful information.  The patient's Seroquel will be discontinued and risperidone will be started at 0.5 Mg twice daily.  This is more likely to help her symptoms and less likely to cause QTc elongation and hypotension compared to Seroquel.      PLAN  Medications as noted above  Attempt to develop insight  Psycho-education conducted.  Supportive Therapy conducted.  Follow-up daily while on inpatient unit    Electronically signed by LESLIE BAUTISTA MD on 1/10/24 at 5:48 PM EST

## 2024-01-10 NOTE — PLAN OF CARE
Spoke with Jesus, ICU manager and he is going to speak with RN and possibly Dr. Borges about paint no being MRI cooperative. MRI will not be able to do tonight due to numerous house patients. Jesus will call back when information is available.

## 2024-01-10 NOTE — PROGRESS NOTES
[facility] Neurology   IN-PATIENT SERVICE      NEUROLOGY PROGRESS  NOTE                Interval History:     Currently calm, on Precedex drip in the intermediate ICU.  Attempted to have MRI brain earlier and despite being calm prior to going down apparently when she was moved she became restless again and exam could not be completed.    History of Present Illness:     The patient is a 73 y.o. female who presents with Altered Mental Status (Worsening today )  . The patient was seen and examined and the chart was reviewed.  Patient presents with worsening bizarre behaviors, agitation, nonsensical speech over the last few days since being discharged home just a few days ago from the hospital.  Daughter is at bedside providing much of the history.     Patient seen last admission on 12/31 by neurology in which she had come into the hospital for confusion found to have UTI, pneumonia, A-fib RVR.  Noted to have hyperactive delirium, nonsensical speech and hallucinations.  Was noted to have a gradual decline in her cognitive impairment over the last 1 year or so.  In addition findings of right temporal meningioma which was known.  She is on antiepileptics due to concern for seizure risk in the past although no abnormal EEGs in the past.  Updated EEG was done during the admission with results of photoparoxysmal response due to photosensitivity versus epileptic spike and wave discharges in the right and left temporal occipital region.  MRI cannot be done due to inability to lie still.  Lamictal was ordered but that it was switched to Vimpat after she was done.  There was some mild improvement in her overall mentation and eventually was discharged home on 1/5.     At this time patient is significantly agitated, despite daughter being at bedside.  She is shouting out bizarre and nonsensical phrases repeatedly.  Multiple prompts taken to get her attention and eventually does follow some basic commands.  Not currently able to  it at this time.  Would also add 1 dose of Haldol 5 mg prior to MRI.  Hopefully the combination of these 2 medications will be enough to keep her still to get MRI done.  This is her second hospitalization for the same issue in the last 2 weeks and with a known history of brain mass surrounded by vasogenic edema there is no choice but to get the MRI done at this time.  If above regimen fails will need to get anesthesia involved.  Continue Vimpat 100 mg twice daily.  Will resume Seroquel 25 mg nightly.  Reportedly discussed hypertension which is not a typical side effect.  Will monitor blood pressures while she is here.  Discussed with nursing staff, Dr. Daniel, family.  We will follow      Electronically signed by Mauri Borges DO on 1/10/2024 at 4:52 PM      Mauri Borges DO  [facility] [place]  Neurology

## 2024-01-10 NOTE — PROGRESS NOTES
MRI attempted again. The patient did not tolerate lying flat and would not stop screaming. Pt continued to get more and more agitated and scan was not able to be completed. Family educated on the patients status and current plan of care. Neurology also notified of failed attempt.

## 2024-01-10 NOTE — PROGRESS NOTES
NEPHROLOGY CONSULT     Patient :  Lupe Ojeda; 73 y.o. MRN# [id number]  Location:  2013/2013-01  Attending:  Ludwig Daniel MD  Admit Date:  1/8/2024   Hospital Day: 2      Reason for Consult:        Chief Complaint:  AMS  History Obtained From:  patient, electronic medical record.    Subjective/interval hx:    Patient seen and examined, transferred to ICU for agitation and need for precedex ggt.  Patient remains agitated and repeat the same sentences.  Had dialysis yesterday tolerated well.  BP stable    History of Present Illness:    This is a 73 y.o. female with a significant past medical history of Cerebrovascular accident, atrial fibrillation status post CABG and recent PCI to left circumflex November 2023,  combined diastolic and systolic heart failure, essential hypertension, partial deafness and  ESRD on hemodialysis Tuesday Thursday Saturday at [facility] dialysis unit under my care.Patient had a recent Hospitalization for UTI/PNA and was discharged on 1/6/24.  Patient came back again to the hospital on 1/8/24 with c/o altered mental status, restlessness, confusion.  UA moderate Hb, small L.E  CT head No acute abnormality.      Past Medical History:        Diagnosis Date    Acute CVA (cerebrovascular accident) ([facility]) 07/25/2020    Acute kidney injury superimposed on CKD ([facility]) 06/08/2022    Acute on chronic combined systolic (congestive) and diastolic (congestive) heart failure ([facility]) 02/06/2022    Acute respiratory failure with hypoxia and hypercapnia ([facility])     JOY (acute kidney injury) ([facility]) 01/15/2022    Arthritis     Asthma     Atrial fibrillation, unspecified type ([facility])     Bilateral lower extremity edema 04/20/2022    Bilateral pleural effusion 11/20/2023    Bradycardia 06/12/2022    Chronic diastolic congestive heart failure ([facility]) 02/20/2020    Chronic ulcer of left leg with fat layer exposed ([facility]) 06/21/2022    CKD stage 4 secondary to hypertension ([facility]) 02/20/2020    Dependence on renal dialysis ([facility])  Not on file     Social Determinants of Health     Financial Resource Strain: Low Risk  (2023)    Overall Financial Resource Strain (CARDIA)     Difficulty of Paying Living Expenses: Not hard at all   Recent Concern: Financial Resource Strain - Medium Risk (2023)    Overall Financial Resource Strain (CARDIA)     Difficulty of Paying Living Expenses: Somewhat hard   Food Insecurity: No Food Insecurity (2024)    Hunger Vital Sign     Worried About Running Out of Food in the Last Year: Never true     Ran Out of Food in the Last Year: Never true   Recent Concern: Food Insecurity - Food Insecurity Present (2023)    Hunger Vital Sign     Worried About Running Out of Food in the Last Year: Sometimes true     Ran Out of Food in the Last Year: Sometimes true   Transportation Needs: No Transportation Needs (2024)    PRAPARE - Transportation     Lack of Transportation (Medical): No     Lack of Transportation (Non-Medical): No   Physical Activity: Inactive (2022)    Exercise Vital Sign     Days of Exercise per Week: 0 days     Minutes of Exercise per Session: 0 min   Stress: Not on file   Social Connections: Not on file   Intimate Partner Violence: Not on file   Housing Stability: Low Risk  (2024)    Housing Stability Vital Sign     Unable to Pay for Housing in the Last Year: No     Number of Places Lived in the Last Year: 1     Unstable Housing in the Last Year: No         Objective:  CURRENT TEMPERATURE:  Temp: 97.6 °F (36.4 °C)  MAXIMUM TEMPERATURE OVER 24HRS:  Temp (24hrs), Av.5 °F (36.4 °C), Min:97.3 °F (36.3 °C), Max:97.7 °F (36.5 °C)    CURRENT RESPIRATORY RATE:  Respirations: 21  CURRENT PULSE:  Pulse: 65  CURRENT BLOOD PRESSURE:  BP: (!) 168/70  24HR BLOOD PRESSURE RANGE:  Systolic (24hrs), Av , Min:96 , Max:207   ; Diastolic (24hrs), Av, Min:33, Max:121    24HR INTAKE/OUTPUT:    Intake/Output Summary (Last 24 hours) at 1/10/2024 1033  Last data filed at 1/10/2024

## 2024-01-10 NOTE — PLAN OF CARE
Problem: Discharge Planning  Goal: Discharge to home or other facility with appropriate resources  1/10/2024 1442 by Tonia Nunez, RN  Outcome: Progressing  Flowsheets (Taken 1/10/2024 0800)  Discharge to home or other facility with appropriate resources:   Identify barriers to discharge with patient and caregiver   Arrange for needed discharge resources and transportation as appropriate   Identify discharge learning needs (meds, wound care, etc)   Refer to discharge planning if patient needs post-hospital services based on physician order or complex needs related to functional status, cognitive ability or social support system     Problem: Skin/Tissue Integrity  Goal: Absence of new skin breakdown  Description: 1.  Monitor for areas of redness and/or skin breakdown  2.  Assess vascular access sites hourly  3.  Every 4-6 hours minimum:  Change oxygen saturation probe site  4.  Every 4-6 hours:  If on nasal continuous positive airway pressure, respiratory therapy assess nares and determine need for appliance change or resting period.  1/10/2024 1442 by Tonia Nunez, RN  Outcome: Progressing     Problem: Safety - Adult  Goal: Free from fall injury  1/10/2024 1442 by Tonia Nunez, RN  Outcome: Progressing     Problem: Confusion  Goal: Confusion, delirium, dementia, or psychosis is improved or at baseline  Description: INTERVENTIONS:  1. Assess for possible contributors to thought disturbance, including medications, impaired vision or hearing, underlying metabolic abnormalities, dehydration, psychiatric diagnoses, and notify attending LIP  2. [place] high risk fall precautions, as indicated  3. Provide frequent short contacts to provide reality reorientation, refocusing and direction  4. Decrease environmental stimuli, including noise as appropriate  5. Monitor and intervene to maintain adequate nutrition, hydration, elimination, sleep and activity  6. If unable to ensure safety without constant attention obtain  Communicate/escalate to /other team member the risk of elopement   Communicate/escalate to nursing supervisor the risk of elopement   Communicate to physician the risk for elopement  Taken 1/10/2024 0400 by Sarah Lane RN  Nursing Interventions for Elopement Risk:   Collaborate with family members/caregivers to mitigate the elopement risk   Collaborate with treatment team for drug withdrawal symptoms treatment   Make sure patient has all necessary personal care items     Problem: Pain  Goal: Verbalizes/displays adequate comfort level or baseline comfort level  1/10/2024 1442 by Tonia Nunez RN  Outcome: Progressing     Problem: Chronic Conditions and Co-morbidities  Goal: Patient's chronic conditions and co-morbidity symptoms are monitored and maintained or improved  1/10/2024 1442 by Tonia Nunez RN  Outcome: Progressing  Flowsheets (Taken 1/10/2024 0800)  Care Plan - Patient's Chronic Conditions and Co-Morbidity Symptoms are Monitored and Maintained or Improved:   Monitor and assess patient's chronic conditions and comorbid symptoms for stability, deterioration, or improvement   Collaborate with multidisciplinary team to address chronic and comorbid conditions and prevent exacerbation or deterioration   Update acute care plan with appropriate goals if chronic or comorbid symptoms are exacerbated and prevent overall improvement and discharge

## 2024-01-10 NOTE — PLAN OF CARE
Problem: Discharge Planning  Goal: Discharge to home or other facility with appropriate resources  1/10/2024 0250 by Sarah Lane RN  Outcome: Progressing  Flowsheets (Taken 1/9/2024 2000)  Discharge to home or other facility with appropriate resources:   Identify barriers to discharge with patient and caregiver   Arrange for needed discharge resources and transportation as appropriate   Refer to discharge planning if patient needs post-hospital services based on physician order or complex needs related to functional status, cognitive ability or social support system  1/9/2024 1441 by Estela Julien RN  Outcome: Progressing     Problem: Skin/Tissue Integrity  Goal: Absence of new skin breakdown  Description: 1.  Monitor for areas of redness and/or skin breakdown  2.  Assess vascular access sites hourly  3.  Every 4-6 hours minimum:  Change oxygen saturation probe site  4.  Every 4-6 hours:  If on nasal continuous positive airway pressure, respiratory therapy assess nares and determine need for appliance change or resting period.  1/10/2024 0250 by Sarah Lane RN  Outcome: Progressing  1/9/2024 1441 by Estela Julien RN  Outcome: Progressing     Problem: Safety - Adult  Goal: Free from fall injury  1/10/2024 0250 by Sarah Lane RN  Outcome: Progressing  1/9/2024 1441 by Estela Julien RN  Outcome: Progressing     Problem: Confusion  Goal: Confusion, delirium, dementia, or psychosis is improved or at baseline  Description: INTERVENTIONS:  1. Assess for possible contributors to thought disturbance, including medications, impaired vision or hearing, underlying metabolic abnormalities, dehydration, psychiatric diagnoses, and notify attending LIP  2. [place] high risk fall precautions, as indicated  3. Provide frequent short contacts to provide reality reorientation, refocusing and direction  4. Decrease environmental stimuli, including noise as appropriate  5. Monitor and intervene to maintain

## 2024-01-10 NOTE — PROGRESS NOTES
Pulmonary Progress Note  [facility]      Patient - Lupe Ojeda,  Age - 73 y.o.    - 1950      Room Number -    MRN -  [id number]   [facility] # - [id number]  Date of Admission -  2024  8:24 PM    Consulting Service/Physician   Consulting - Ludwig Daniel MD  Primary Care Physician - Kayleigh Cardoso MD     SUBJECTIVE   Patient is on 1.4 doses Precedex.  She looks very calm.  Patient's daughter Svitlana present at bedside    OBJECTIVE   VITALS    height is 1.524 m (5') and weight is 59.6 kg (131 lb 6.4 oz). Her axillary temperature is 98 °F (36.7 °C). Her blood pressure is 168/52 (abnormal) and her pulse is 63. Her respiration is 20 and oxygen saturation is 95%.     Body mass index is 25.66 kg/m².  Temperature Range: Temp: 98 °F (36.7 °C) Temp  Av.7 °F (36.5 °C)  Min: 97.6 °F (36.4 °C)  Max: 98 °F (36.7 °C)  BP Range:  Systolic (24hrs), Av , Min:96 , Max:207     Diastolic (24hrs), Av, Min:33, Max:121    Pulse Range: Pulse  Av.7  Min: 60  Max: 135  Respiration Range: Resp  Av.7  Min: 13  Max: 28  Current Pulse Ox::  SpO2: 95 %  24HR Pulse Ox Range:  SpO2  Av.5 %  Min: 86 %  Max: 97 %  Oxygen Amount and Delivery:      Wt Readings from Last 3 Encounters:   01/10/24 59.6 kg (131 lb 6.4 oz)   24 66 kg (145 lb 8.1 oz)   23 56.7 kg (125 lb)       I/O (24 Hours)    Intake/Output Summary (Last 24 hours) at 1/10/2024 1555  Last data filed at 1/10/2024 1404  Gross per 24 hour   Intake 1872.88 ml   Output --   Net 1872.88 ml       EXAM     General Appearance  Awake, alert, oriented, in no acute distress  HEENT - normocephalic, atraumatic.  Neck - Supple,  trachea midline   Lungs -coarse breath sounds no crackles rales or wheezes  Heart Exam:PMI normal. No lifts, heaves, or thrills. RRR. No murmurs, clicks, gallops, or rubs  Abdomen Exam: Abdomen soft, non-tender. BS normal.  Extremity Exam: No signs of cyanosis    MEDS      amiodarone  100  Chronic respiratory failure with hypoxia (HCC)    Other osteoporosis without current pathological fracture    Moderate vascular dementia with anxiety (HCC)    Recurrent falls while walking    Persistent atrial fibrillation (HCC)    Slow transit constipation    Right temporal lobe mass    History of type 2 diabetes mellitus    History of chronic atrial fibrillation    Acute encephalopathy    Bacteremia    Actinomyces infection    ESRD (end stage renal disease) on dialysis (HCC)    Other seizures    Pressure ulcer of coccygeal region, stage III (HCC)    Bilateral pleural effusion    CAD S/P percutaneous coronary angioplasty    H/O heart artery stent    Abnormal echocardiogram    Mixed hyperlipidemia    Sepsis due to urinary tract infection (HCC)    Renovascular hypertension    Goals of care, counseling/discussion    DNR (do not resuscitate) discussion    ACP (advance care planning)    Palliative care encounter    Acute cystitis with hematuria    Permanent atrial fibrillation (HCC)    Ischemic cardiomyopathy     Impression  Patient with encephalopathy.  Patient is being transferred to ICU intermediate care for need of Precedex.  History of right cranial fossa meningioma.  End-stage renal disease on hemodialysis  History of coronary disease status post bypass graft surgery.  Full code.    Patient is currently in intermediate care on Precedex drip.  There is a recommendation for the patient to undergo an MRI by neurology.  Can the patient currently tolerate MRI at this stage.  Patient dialysis per nephrology    Electronically signed by Dewey Denis MD on 1/10/2024 at 3:55 PM

## 2024-01-10 NOTE — PROGRESS NOTES
Attempted to scan pt for 5th time today. Pt became extremely agitated and would not lay down for MRI. She kept pulling herself up in a sitting position. She was screaming for help. Pt was not scanned due to her cooperation. Pt is not a good MRI candidate. If there are any questions please call [phone].

## 2024-01-10 NOTE — PROGRESS NOTES
Dr. Daniel notified of elevated BP and pts refusal to take oral medications for me. Orders received for Hydralazine 10 mg IV q6h PRN for SBP > 160

## 2024-01-10 NOTE — PROGRESS NOTES
[facility]  Progress Note                   Date:   1/10/2024  Patient name: Lupe Ojeda  Date of admission:  1/8/2024  8:24 PM  MRN:   [id number]  YOB: 1950  PCP: Kayleigh Cardoso MD    Reason for Admission: Altered mental status [R41.82]  Altered mental status, unspecified altered mental status type [R41.82]    Subjective:       Clinical Changes /Abnormalities:Patient seen and examined earlier . Tele/vitals/labs reviewed .  Afib rate controlled on tele     Review of Systems    Medications:   Scheduled Meds:   amiodarone  100 mg Oral QAM    aspirin  81 mg Oral Daily    atorvastatin  40 mg Oral QPM    clopidogrel  75 mg Oral Daily    famotidine  10 mg Oral QAM AC    budesonide-formoterol  2 puff Inhalation BID RT    [Held by provider] furosemide  80 mg Oral Every Other Day    lacosamide  100 mg Oral BID    levothyroxine  25 mcg Oral QAM AC    lidocaine  1 patch Topical Daily    metoprolol succinate  25 mg Oral QPM    mirtazapine  15 mg Oral Nightly    mupirocin   Topical Daily    miconazole   Topical BID    polyethylene glycol  17 g Oral Daily    tiotropium  2 puff Inhalation QAM AC    sodium chloride flush  5-40 mL IntraVENous 2 times per day    heparin (porcine)  5,000 Units SubCUTAneous 3 times per day    piperacillin-tazobactam  3,375 mg IntraVENous Q12H    linezolid  600 mg IntraVENous Q12H    dexAMETHasone  2 mg IntraVENous Q6H    cetirizine  5 mg Oral Daily     Continuous Infusions:   sodium chloride      sodium chloride 35 mL/hr at 01/10/24 0525    dexmedeTOMIDine 0.6 mcg/kg/hr (01/10/24 0945)     CBC:   Recent Labs     01/08/24 2200 01/09/24  0851 01/10/24  0433   WBC 8.4 6.8 3.8   HGB 12.5 11.5* 11.2*   * 135* 108*     BMP:    Recent Labs     01/08/24 2200 01/09/24  0851 01/10/24  0433    141 135   K 4.3 3.9 4.1   CL 97* 99 97*   CO2 24 24 25   BUN 30* 32* 14   CREATININE 4.5* 4.9* 2.7*   GLUCOSE 198* 156* 167*     Hepatic:  Recent Labs

## 2024-01-10 NOTE — PROGRESS NOTES
Daughters are at pt's bedside. Pt needs MRI and even with sedation was not able to tolerate it. Daughters anxious. Writer provided listening presence and they said they know their mom would welcome prayer.    01/10/24 1502   Encounter Summary   Encounter Overview/Reason  Spiritual/Emotional Needs   Service Provided For: Patient and family together   Referral/Consult From: Palliative Care   Support System Children   Last Encounter  01/10/24   Complexity of Encounter Moderate   Spiritual/Emotional needs   Type Spiritual Support   Palliative Care   Type Palliative Care, Family Care   Assessment/Intervention/Outcome   Assessment Anxious   Intervention Active listening;Discussed illness injury and it’s impact;Explored/Affirmed feelings, thoughts, concerns;Prayer (assurance of)/[place];Sustaining Presence/Ministry of presence   Outcome Engaged in conversation;Expressed feelings, needs, and concerns;Expressed Gratitude

## 2024-01-10 NOTE — PROGRESS NOTES
Discussed concerns about MRI with Jesus ICU RN Manager. Jesus planning to reach out to neurology and internal medicine about a plan of care. MRI unable to be done today, pts family updated on current concerns about MRI and all questions and concerns addressed.

## 2024-01-10 NOTE — CARE COORDINATION
ONGOING DISCHARGE PLAN:    Patient is sleeping. Currently on precedex drip.    Spoke with patient's daughters at bedside regarding discharge plan and confirms that plan is still home with no needs.    Neuro consult-  MRI Brain pending    IV zyvox/zoysn    Psych consult pending    Nephro consult-ESRD  HD on TTS [facility]     Palliative Care consult pending    PT/OT pending    Will continue to follow for additional discharge needs.    If patient is discharged prior to next notation, then this note serves as note for discharge by case management.    Electronically signed by Debbie Mcallister RN on 1/10/2024 at 11:17 AM

## 2024-01-10 NOTE — CONSULTS
Palliative Care Inpatient Consult    NAME:  Lupe Ojeda  MEDICAL RECORD NUMBER:  [id number]  AGE: 73 y.o.   GENDER: female  : 1950  TODAY'S DATE:  1/10/2024    Reasons for Consultation:    Symptom and/or pain management  Provision of information regarding PC and/or hospice philosophies  Complex, time-intensive communication and interdisciplinary psychosocial support  Clarification of goals of care and/or assistance with difficult decision-making  Guidance in regards to resources and transition(s)    Members of PC team contributing to this consultation are : Barbara Pearl, Palliative Care APRN-CNP    Plan      Palliative Interaction: I went to see patient and she is groggy and confused. Daughter Orah at bedside. I explained the palliative care role and informed her that we had seen her last hospitalization that was recent and similar complaints.     I discussed chronic history and she reports that patient lives at home with sister and brother. She reports all children assist with her care. She reports patient only transfers with assist. She reports [place] and cataracts awaiting clearance for surgery.     I discussed hospitalized problems in detail. Daughter reports that patients mental status has been declining but she reports what prompted her to return to the ER was patient mumbling. She reports not getting answers from last stay with why patients mentation is declining.     I discussed goals and daughter reports that patient always wanted everything done. She reports saying that she wanted HD because she didn't want to die. We discussed how she has multi organs effected and that it's important to discuss if patient continues to decline and doesn't have any QOL would she still want to continue with treatment.     I explained all code classifications in detail. I stated the differences in full code and DNRCC-A. Daughter reports needing to talk with siblings more. She reports brother not being receptive  2022         IR NONTUNNELED VASCULAR CATHETER  2022    IR NONTUNNELED VASCULAR CATHETER 2022 [facility] SPECIAL PROCEDURES    IR TUNNELED CATHETER PLACEMENT GREATER THAN 5 YEARS  2022    IR TUNNELED CATHETER PLACEMENT GREATER THAN 5 YEARS 2022 [facility] SPECIAL PROCEDURES    THORACENTESIS  03/10/2022         TONSILLECTOMY AND ADENOIDECTOMY      UPPER GASTROINTESTINAL ENDOSCOPY N/A 03/15/2022    EGD BIOPSY performed by Manjeet Wall MD at [facility] ENDO    UPPER GASTROINTESTINAL ENDOSCOPY N/A 2022    EGD ESOPHAGOGASTRODUODENOSCOPY performed by Linwood Watts MD at [facility] ENDO       SOCIAL HISTORY  Social History     Tobacco Use    Smoking status: Former     Current packs/day: 0.00     Average packs/day: 0.3 packs/day for 10.0 years (2.5 ttl pk-yrs)     Types: Cigarettes     Start date: 1990     Quit date: 2000     Years since quittin.0     Passive exposure: Past    Smokeless tobacco: Never   Vaping Use    Vaping Use: Never used   Substance Use Topics    Alcohol use: Not Currently     Alcohol/week: 0.0 standard drinks of alcohol    Drug use: No       FAMILY HISTORY  ..  Family History   Problem Relation Age of Onset    Diabetes Mother     Heart Disease Mother     Heart Disease Father     Diabetes Sister     High Blood Pressure Sister     Diabetes Maternal Grandmother     Breast Cancer Maternal Cousin         ALLERGIES  Allergies   Allergen Reactions    Latex Rash    Aleve [Naproxen Sodium]      Chronic kidney disease stage III, CHF    Apixaban      Severe GI bleed, needed blood transfusion      Naproxen      ESKD, CHF    Pioglitazone Other (See Comments)     Congestive heart failure    Claritin [Loratadine]     Keflex [Cephalexin]     Adhesive Tape     Keppra [Levetiracetam]     Lisinopril      Needs clarification of contraindication         MEDICATIONS  Current Medications    amiodarone  100 mg Oral QAM    aspirin  81 mg Oral Daily    atorvastatin  40 mg Oral QPM     kg/m²     Wt Readings from Last 3 Encounters:   01/10/24 59.6 kg (131 lb 6.4 oz)   01/04/24 66 kg (145 lb 8.1 oz)   12/18/23 56.7 kg (125 lb)        Lab Results   Component Value Date    WBC 3.8 01/10/2024    HGB 11.2 (L) 01/10/2024    HCT 35.1 (L) 01/10/2024    MCV 92.2 01/10/2024     (L) 01/10/2024    ,   Lab Results   Component Value Date/Time     01/10/2024 04:33 AM    K 4.1 01/10/2024 04:33 AM    CL 97 01/10/2024 04:33 AM    CO2 25 01/10/2024 04:33 AM    BUN 14 01/10/2024 04:33 AM    CREATININE 2.7 01/10/2024 04:33 AM    GLUCOSE 167 01/10/2024 04:33 AM    CALCIUM 8.5 01/10/2024 04:33 AM    ,   Lab Results   Component Value Date    INR 1.0 12/31/2023    INR 1.0 12/31/2023    INR 1.0 07/31/2023    PROTIME 13.6 12/31/2023    PROTIME 13.5 12/31/2023    PROTIME 13.1 07/31/2023   , .  Lab Results   Component Value Date    ALT 18 01/08/2024    AST 25 01/08/2024    ALKPHOS 116 (H) 01/08/2024    BILITOT 0.5 01/08/2024   , No results found for: \"CKTOTAL\", \"CKMB\", \"CKMBINDEX\", \"TROPONINI\",   Lab Results   Component Value Date/Time    COLORU Yellow 01/08/2024 10:51 PM    NITRU NEGATIVE 01/08/2024 10:51 PM    GLUCOSEU TRACE 01/08/2024 10:51 PM    KETUA NEGATIVE 01/08/2024 10:51 PM    UROBILINOGEN Normal 01/08/2024 10:51 PM    BILIRUBINUR NEGATIVE 01/08/2024 10:51 PM   , No results found for: \"AMYLASE\",   Lab Results   Component Value Date    LIPASE 10 (L) 12/31/2023   ,   Lab Results   Component Value Date    DDIMER 0.71 (H) 07/25/2020   , No results found for: \"BNP\", No results found for: \"CEA\", No results found for: \"\", No results found for: \"AFPAMN\",   Lab Results   Component Value Date    LACTA 1.3 01/09/2024   ,     CT Result (most recent):  CT HEAD WO CONTRAST 01/08/2024    Addendum 1/8/2024  9:57 PM  ADDENDUM:  The findings were sent to the Radiology Results Communication Center at 8:55  pm on 1/8/2024 to be communicated to a licensed caregiver.    Narrative  EXAMINATION:  CT OF THE HEAD WITHOUT

## 2024-01-10 NOTE — PROGRESS NOTES
[facility]   IN-PATIENT SERVICE   [facility]    HISTORY AND PHYSICAL EXAMINATION            Date:   1/10/2024  Patient name:  Lupe Ojeda  Date of admission:  1/8/2024  8:24 PM  MRN:   [id number]  Account:  [id number]  YOB: 1950  PCP:    Kayleigh Cardoso MD  Room:   2013/2013-01  Code Status:    Full Code    Chief Complaint:     Chief Complaint   Patient presents with    Altered Mental Status     Worsening today        History Obtained From:     patient, electronic medical record    History of Present Illness:     The patient is a 73 y.o.  Non- / non  female who presents with Altered Mental Status (Worsening today )   and she is admitted to the hospital for the management of altered mental status  Patient has past medical history of multiple medical problem which include coronary artery disease s/p CABG, heart failure with reduced ejection fraction, history of stroke, history of hematuria, history of A-fib not on anticoagulation due to bleeding risk, COPD, ESRD on hemodialysis, bilateral cataract, hearing disability  Patient was recently discharged from the hospital only few days ago when she was admitted with altered mental status, underwent CT head suggestive of meningioma with mass effect, patient also underwent EEG suggestive of sharp waves concerning for possible seizures, started on antiepileptics  Patient family did not want to get her discharged to SNF  Patient eventually discharged home  Patient presented again with increasing confusion, as per daughter at bedside, patient was talking gibberish,  She was started on Seroquel which was discontinued outpatient due to side effect  Denying complaints of chest pain, shortness of breath  Patient is compliant with her diet medication  Last admission was planned to get MRI done which unfortunately could not be done because of patient agitation  1/10   Patient, still speaking gibberish  On Precedex        Plan:     Patient status Admit as inpatient in the  Progressive Unit/Step down    Patient admitted with increasing confusion, may have underlying subclinical seizures, concern for UTI, , final urine culture is negative, will discontinue antibiotics,   History of right middle cranial fossa meningioma, with vasogenic edema, patient was treated with steroid last time, will add Decadron, plan for MRI  Seizure disorder, on antiepileptics  History of coronary artery disease s/p CABG, patient is on amiodarone, aspirin, Plavix, Lopressor  Hypothyroidism on Synthroid  Heart failure with reduced ejection fraction, patient has poor oral intake, elevated lactic acid will hold Lasix, starting patient on gentle hydration  A-fib,  cardiology following, rate is well-controlled, on p.o. amiodarone and beta-blockers  Heparin for DVT prophylaxis  COPD, compensated, on Symbicort and Spiriva  Hypothyroidism -TSH tested recently is okay  ESRD on hemodialysis  Patient need transfer to ICU yesterday with Precedex drip  Overall prognosis guarded  High chance of clinical deterioration  PT/OT consult  Palliative care consulted  Consultations:   Patient is admitted as inpatient status because of co-morbidities listed above, severity of signs and symptoms as outlined, requirement for current medical therapies and most importantly because of direct risk to patient if care not provided in a hospital setting.    Ludwig Daniel MD  1/10/2024  11:42 AM    Copy sent to Kayleigh Pittman MD    Please note that this chart was generated using voice recognition Dragon dictation software.  Although every effort was made to ensure the accuracy of this automated transcription, some errors in transcription may have occurred.

## 2024-01-10 NOTE — PROGRESS NOTES
Physician Progress Note      PATIENT:               LPUE NELSON  CSN #:                  [id number]  :                       1950  ADMIT DATE:       2023 9:11 AM  DISCH DATE:        2024 5:56 PM  RESPONDING  PROVIDER #:        Claudia Stauffer MD          QUERY TEXT:    Pt admitted with UTI. Pt noted to have AMS. If possible, please document in   the progress notes and discharge summary if you are evaluating and / or   treating any of the following:    The medical record reflects the following:  Risk Factors: PNA, UTI  Clinical Indicators: confused and disoriented x3, GCS of 14, CT of head WNL  Treatment: IVF, labs, IV Zosyn    Thank you,  Martina ARREDONDO RN  Options provided:  -- Metabolic encephalopathy  -- Septic encephalopathy  -- Other - I will add my own diagnosis  -- Disagree - Not applicable / Not valid  -- Disagree - Clinically unable to determine / Unknown  -- Refer to Clinical Documentation Reviewer    PROVIDER RESPONSE TEXT:    This patient has metabolic encephalopathy.    Query created by: Lupe Torres on 2024 3:42 PM      Electronically signed by:  Claudia Stauffer MD 1/10/2024 8:02 AM

## 2024-01-11 ENCOUNTER — APPOINTMENT (OUTPATIENT)
Dept: MRI IMAGING | Age: 74
DRG: 080 | End: 2024-01-11
Payer: MEDICARE

## 2024-01-11 PROBLEM — G93.40 ACUTE ENCEPHALOPATHY: Status: ACTIVE | Noted: 2024-01-11

## 2024-01-11 LAB
ABSOLUTE BANDS: 0.05 K/UL (ref 0–1)
ANION GAP SERPL CALCULATED.3IONS-SCNC: 18 MMOL/L (ref 9–17)
BANDS: 1 % (ref 0–10)
BASOPHILS # BLD: 0 K/UL (ref 0–0.2)
BASOPHILS NFR BLD: 0 % (ref 0–2)
BUN SERPL-MCNC: 27 MG/DL (ref 8–23)
CALCIUM SERPL-MCNC: 8.5 MG/DL (ref 8.6–10.4)
CHLORIDE SERPL-SCNC: 97 MMOL/L (ref 98–107)
CO2 SERPL-SCNC: 21 MMOL/L (ref 20–31)
CREAT SERPL-MCNC: 3.6 MG/DL (ref 0.5–0.9)
EOSINOPHIL # BLD: 0 K/UL (ref 0–0.4)
EOSINOPHILS RELATIVE PERCENT: 0 % (ref 0–4)
ERYTHROCYTE [DISTWIDTH] IN BLOOD BY AUTOMATED COUNT: 18.2 % (ref 11.5–14.9)
GFR SERPL CREATININE-BSD FRML MDRD: 13 ML/MIN/1.73M2
GLUCOSE SERPL-MCNC: 157 MG/DL (ref 70–99)
HCT VFR BLD AUTO: 41 % (ref 36–46)
HGB BLD-MCNC: 13 G/DL (ref 12–16)
LYMPHOCYTES NFR BLD: 0.39 K/UL (ref 1–4.8)
LYMPHOCYTES RELATIVE PERCENT: 8 % (ref 24–44)
MCH RBC QN AUTO: 29.8 PG (ref 26–34)
MCHC RBC AUTO-ENTMCNC: 31.8 G/DL (ref 31–37)
MCV RBC AUTO: 93.7 FL (ref 80–100)
MONOCYTES NFR BLD: 0.39 K/UL (ref 0.1–1.3)
MONOCYTES NFR BLD: 8 % (ref 1–7)
MORPHOLOGY: ABNORMAL
MORPHOLOGY: ABNORMAL
NEUTROPHILS NFR BLD: 83 % (ref 36–66)
NEUTS SEG NFR BLD: 4.07 K/UL (ref 1.3–9.1)
PLATELET # BLD AUTO: 136 K/UL (ref 150–450)
PMV BLD AUTO: 9.1 FL (ref 6–12)
POTASSIUM SERPL-SCNC: 4.4 MMOL/L (ref 3.7–5.3)
RBC # BLD AUTO: 4.38 M/UL (ref 4–5.2)
SODIUM SERPL-SCNC: 136 MMOL/L (ref 135–144)
WBC OTHER # BLD: 4.9 K/UL (ref 3.5–11)

## 2024-01-11 PROCEDURE — 2500000003 HC RX 250 WO HCPCS: Performed by: INTERNAL MEDICINE

## 2024-01-11 PROCEDURE — 6360000002 HC RX W HCPCS: Performed by: NURSE PRACTITIONER

## 2024-01-11 PROCEDURE — 85025 COMPLETE CBC W/AUTO DIFF WBC: CPT

## 2024-01-11 PROCEDURE — 6360000002 HC RX W HCPCS: Performed by: PSYCHIATRY & NEUROLOGY

## 2024-01-11 PROCEDURE — 99232 SBSQ HOSP IP/OBS MODERATE 35: CPT | Performed by: PSYCHIATRY & NEUROLOGY

## 2024-01-11 PROCEDURE — 6360000002 HC RX W HCPCS

## 2024-01-11 PROCEDURE — C9254 INJECTION, LACOSAMIDE: HCPCS | Performed by: NURSE PRACTITIONER

## 2024-01-11 PROCEDURE — 6370000000 HC RX 637 (ALT 250 FOR IP): Performed by: PSYCHIATRY & NEUROLOGY

## 2024-01-11 PROCEDURE — 70551 MRI BRAIN STEM W/O DYE: CPT

## 2024-01-11 PROCEDURE — 80048 BASIC METABOLIC PNL TOTAL CA: CPT

## 2024-01-11 PROCEDURE — 99233 SBSQ HOSP IP/OBS HIGH 50: CPT | Performed by: INTERNAL MEDICINE

## 2024-01-11 PROCEDURE — 2580000003 HC RX 258: Performed by: INTERNAL MEDICINE

## 2024-01-11 PROCEDURE — 90935 HEMODIALYSIS ONE EVALUATION: CPT

## 2024-01-11 PROCEDURE — 6360000002 HC RX W HCPCS: Performed by: INTERNAL MEDICINE

## 2024-01-11 PROCEDURE — 2060000000 HC ICU INTERMEDIATE R&B

## 2024-01-11 PROCEDURE — 36415 COLL VENOUS BLD VENIPUNCTURE: CPT

## 2024-01-11 PROCEDURE — 2580000003 HC RX 258: Performed by: NURSE PRACTITIONER

## 2024-01-11 PROCEDURE — 99233 SBSQ HOSP IP/OBS HIGH 50: CPT | Performed by: PSYCHIATRY & NEUROLOGY

## 2024-01-11 RX ORDER — DEXAMETHASONE SODIUM PHOSPHATE 4 MG/ML
2 INJECTION, SOLUTION INTRA-ARTICULAR; INTRALESIONAL; INTRAMUSCULAR; INTRAVENOUS; SOFT TISSUE EVERY 8 HOURS
Status: DISCONTINUED | OUTPATIENT
Start: 2024-01-11 | End: 2024-01-16 | Stop reason: HOSPADM

## 2024-01-11 RX ORDER — LORAZEPAM 2 MG/ML
2 INJECTION INTRAMUSCULAR ONCE
Status: COMPLETED | OUTPATIENT
Start: 2024-01-11 | End: 2024-01-11

## 2024-01-11 RX ORDER — RISPERIDONE 1 MG/1
1 TABLET, ORALLY DISINTEGRATING ORAL 2 TIMES DAILY
Status: DISCONTINUED | OUTPATIENT
Start: 2024-01-11 | End: 2024-01-16 | Stop reason: HOSPADM

## 2024-01-11 RX ORDER — FLUMAZENIL 0.1 MG/ML
0.2 INJECTION INTRAVENOUS ONCE
Status: COMPLETED | OUTPATIENT
Start: 2024-01-11 | End: 2024-01-11

## 2024-01-11 RX ADMIN — LACOSAMIDE 100 MG: 10 INJECTION INTRAVENOUS at 21:21

## 2024-01-11 RX ADMIN — LORAZEPAM 2 MG: 2 INJECTION INTRAMUSCULAR; INTRAVENOUS at 15:05

## 2024-01-11 RX ADMIN — HEPARIN SODIUM 5000 UNITS: 5000 INJECTION INTRAVENOUS; SUBCUTANEOUS at 21:36

## 2024-01-11 RX ADMIN — DEXAMETHASONE SODIUM PHOSPHATE 2 MG: 4 INJECTION INTRA-ARTICULAR; INTRALESIONAL; INTRAMUSCULAR; INTRAVENOUS; SOFT TISSUE at 03:49

## 2024-01-11 RX ADMIN — DEXMEDETOMIDINE HYDROCHLORIDE 1.4 MCG/KG/HR: 400 INJECTION INTRAVENOUS at 14:32

## 2024-01-11 RX ADMIN — SODIUM CHLORIDE: 9 INJECTION, SOLUTION INTRAVENOUS at 03:49

## 2024-01-11 RX ADMIN — HEPARIN SODIUM 5000 UNITS: 5000 INJECTION INTRAVENOUS; SUBCUTANEOUS at 06:01

## 2024-01-11 RX ADMIN — HYDRALAZINE HYDROCHLORIDE 10 MG: 20 INJECTION INTRAMUSCULAR; INTRAVENOUS at 05:08

## 2024-01-11 RX ADMIN — HALOPERIDOL LACTATE 5 MG: 5 INJECTION, SOLUTION INTRAMUSCULAR at 14:32

## 2024-01-11 RX ADMIN — FLUMAZENIL 0.2 MG: 0.1 INJECTION, SOLUTION INTRAVENOUS at 16:45

## 2024-01-11 RX ADMIN — DEXAMETHASONE SODIUM PHOSPHATE 2 MG: 4 INJECTION INTRA-ARTICULAR; INTRALESIONAL; INTRAMUSCULAR; INTRAVENOUS; SOFT TISSUE at 14:09

## 2024-01-11 RX ADMIN — ANTI-FUNGAL POWDER MICONAZOLE NITRATE TALC FREE: 1.42 POWDER TOPICAL at 03:33

## 2024-01-11 RX ADMIN — SODIUM CHLORIDE: 9 INJECTION, SOLUTION INTRAVENOUS at 21:20

## 2024-01-11 RX ADMIN — ONDANSETRON 4 MG: 2 INJECTION INTRAMUSCULAR; INTRAVENOUS at 16:47

## 2024-01-11 RX ADMIN — RISPERIDONE 1 MG: 1 TABLET, ORALLY DISINTEGRATING ORAL at 21:27

## 2024-01-11 RX ADMIN — HEPARIN SODIUM 5000 UNITS: 5000 INJECTION INTRAVENOUS; SUBCUTANEOUS at 14:14

## 2024-01-11 RX ADMIN — DEXAMETHASONE SODIUM PHOSPHATE 2 MG: 4 INJECTION INTRA-ARTICULAR; INTRALESIONAL; INTRAMUSCULAR; INTRAVENOUS; SOFT TISSUE at 21:36

## 2024-01-11 RX ADMIN — DEXMEDETOMIDINE HYDROCHLORIDE 1.4 MCG/KG/HR: 400 INJECTION INTRAVENOUS at 09:01

## 2024-01-11 RX ADMIN — DEXMEDETOMIDINE HYDROCHLORIDE 0.6 MCG/KG/HR: 400 INJECTION INTRAVENOUS at 03:41

## 2024-01-11 RX ADMIN — LACOSAMIDE 100 MG: 10 INJECTION INTRAVENOUS at 14:04

## 2024-01-11 NOTE — PROGRESS NOTES
[facility]    Date: 2024  Patient Name: Lupe Ojeda        : 1950       [] Pt Refusal           [x] Pt Unavailable due to:     pt / bed out of room for MRI at 3:06 pm.       Lisa Olivas, OT,   Date: 2024

## 2024-01-11 NOTE — PROGRESS NOTES
HEMODIALYSIS POST TREATMENT NOTE    Treatment time ordered: 3 hours    Actual treatment time: 3 hours    UltraFiltration Goal: 2500  UltraFiltration Removed: 2500      Pre Treatment weight: 60.1kg  Post Treatment weight: 58.1kg  Estimated Dry Weight:     Access used:     Central Venous Catheter: tunneled right chest     Internal Access: right forearm fistula not using       Access Flow: good     Sign and symptoms of infection: no       If YES: n/a    Medications or blood products given: none    Regular outpatient schedule: TTS    Summary of response to treatment: Patient tolerated treatment well, continues to be confused, talking about the past, will not follow commands or answer questions appropriately. Family at bedside.    Explain if orders NOT met, was physician notified:n/a      ACES flowsheet faxed to patient unit/ placed in patient chart: will do when completed    Post assessment completed: yes    Report given to: Alison Dawson      * Intra-treatment documented Safety Checks include the followin) Access and face visible at all times.     2) All connections and blood lines are secure with no kinks.     3) NVL alarm engaged.     4) Hemosafe device applied (if applicable).     5) No collapse of Arterial or Venous blood chambers.     6) All blood lines / pump segments in the air detectors.

## 2024-01-11 NOTE — PROGRESS NOTES
HEMODIALYSIS PRE-TREATMENT NOTE    Patient Identifiers prior to treatment: mrn, name,     Isolation Required: yes                    Isolation Type: contact, hx mrsa       (please document if patient is being managed as a PUI/COVID-19 patient)        Hepatitis status:                           Date Drawn                             Result  Hepatitis B Surface Antigen 2024     neg                     Hepatitis B Surface Antibody 2022 neg        Hepatitis B Core Antibody 2021 neg          How was Hepatitis Status verified: epic     Was a copy of the labs you documented provided to facility for the patient's chart: yes    Hemodialysis orders verified: yes    Access Within normal limits ( I.e. s/s of infection,...): yes     Pre-Assessment completed: yes    Pre-dialysis report received from:     Alison Dawson                  Time: 0900

## 2024-01-11 NOTE — ACP (ADVANCE CARE PLANNING)
Advance Care Planning     Advance Care Planning (ACP) Physician/NP/PA Conversation    Date of Conversation: 1/8/2024  Conducted with:  Healthcare Decision Maker: Next of Kin by law (only applies in absence of above) (name) Sea    Healthcare Decision Maker:      Primary Decision Maker: Sea Nelson - Child - [phone]    Primary Decision Maker: CYRIL NELSON - Child - [phone]    Primary Decision Maker: AZALEA NELSON - Child - [phone]    Primary Decision Maker: Kimberly Lema - Child - [phone]    Click here to complete Healthcare Decision Makers including selection of the Healthcare Decision Maker Relationship (ie \"Primary\")  Today we documented Decision Maker(s) consistent with Legal Next of Kin hierarchy.    Care Preferences:    Hospitalization:  \"If your health worsens and it becomes clear that your chance of recovery is unlikely, what would be your preference regarding hospitalization?\"  The patient would prefer hospitalization.    Ventilation:  \"If you were unable to breath on your own and your chance of recovery was unlikely, what would be your preference about the use of a ventilator (breathing machine) if it was available to you?\"  The patient would desire the use of a ventilator.    Resuscitation:  \"In the event your heart stopped as a result of an underlying serious health condition, would you want attempts made to restart your heart, or would you prefer a natural death?\"  Yes, attempt to resuscitate.    treatment goals, benefit/burden of treatment options, ventilation preferences, hospitalization preferences, and resuscitation preferences    Conversation Outcomes / Follow-Up Plan:  ACP in process - information provided, considering goals and options  Reviewed DNR/DNI and patient elects Full Code (Attempt Resuscitation)    Length of Voluntary ACP Conversation in minutes:  <16 minutes (Non-Billable)    SARITHA GILES, APRN - CNP

## 2024-01-11 NOTE — CARE COORDINATION
DISCHARGE PLANNING NOTE:    This writer spoke with the patient's family at bedside regarding LTACH criteria. At this time the patient's family state they are not interested in the patient going to any other facility and they will be taking her home when she is medically clear. At this time it was expressed to this writer that they assist the patient with all of her needs. Patient sleeps in a lift chair due to the curvature of her spine.                       Post Acute Facility/Agency List     Provided child with the following list, the list includes the overall star ratings obtained from CMS per the Medicare Web site (www.Medicare.gov):     [] Long Term Acute Care Facilities  [] Acute Inpatient Rehabilitation Facilities  [] Skilled Nursing Facilities  [x] Home Care    Provided verbal instructions on how to utilize the QR Code to obtain additional detailed star ratings from www.Medicare.gov     offered to print and provide the detailed list:    [x]Accepted   []Declined    The following printed detailed lists were provided:     Home Care    Home care list provided as the family stated they \"may be interested.\" Will need choice.     Electronically signed by Violeta Reynaga RN on 1/11/2024 at 5:02 PM

## 2024-01-11 NOTE — PROGRESS NOTES
01/11/24 1517   Encounter Summary   Encounter Overview/Reason  Attempted Encounter   Service Provided For: Patient not available  (Staff in room)

## 2024-01-11 NOTE — PROGRESS NOTES
[facility]   IN-PATIENT SERVICE   [facility]    HISTORY AND PHYSICAL EXAMINATION            Date:   1/11/2024  Patient name:  Lupe Ojeda  Date of admission:  1/8/2024  8:24 PM  MRN:   [id number]  Account:  [id number]  YOB: 1950  PCP:    Kayleigh Cardoso MD  Room:   2013/2013-01  Code Status:    Full Code    Chief Complaint:     Chief Complaint   Patient presents with    Altered Mental Status     Worsening today        History Obtained From:     patient, electronic medical record    History of Present Illness:     The patient is a 73 y.o.  Non- / non  female who presents with Altered Mental Status (Worsening today )   and she is admitted to the hospital for the management of altered mental status  Patient has past medical history of multiple medical problem which include coronary artery disease s/p CABG, heart failure with reduced ejection fraction, history of stroke, history of hematuria, history of A-fib not on anticoagulation due to bleeding risk, COPD, ESRD on hemodialysis, bilateral cataract, hearing disability  Patient was recently discharged from the hospital only few days ago when she was admitted with altered mental status, underwent CT head suggestive of meningioma with mass effect, patient also underwent EEG suggestive of sharp waves concerning for possible seizures, started on antiepileptics  Patient family did not want to get her discharged to SNF  Patient eventually discharged home  Patient presented again with increasing confusion, as per daughter at bedside, patient was talking gibberish,  She was started on Seroquel which was discontinued outpatient due to side effect  Denying complaints of chest pain, shortness of breath  Patient is compliant with her diet medication  Last admission was planned to get MRI done which unfortunately could not be done because of patient agitation  1/11  Patient, slightly better from yesterday  Little bit more  MG tablet Take 1 tablet by mouth 2 times daily 10/25/23  Yes Alyssa Darnell MD   atorvastatin (LIPITOR) 40 MG tablet Take 1 tablet by mouth every evening Take 1 tablet by mouth once daily 10/23/23  Yes Kayleigh Cardoso MD   melatonin 5 MG TABS tablet Take 1 tablet by mouth every evening For insomnia 10/23/23  Yes Kayleigh Cardoso MD   fluticasone-salmeterol (ADVAIR HFA) 115-21 MCG/ACT inhaler Inhale 2 puffs into the lungs 2 times daily Maintenance inhaler 10/23/23  Yes Kayleigh Cardoso MD   tiotropium (SPIRIVA RESPIMAT) 2.5 MCG/ACT AERS inhaler Inhale 2 puffs into the lungs every morning (before breakfast) 10/23/23  Yes Kayleigh Cardoso MD   lidocaine (LIDOCAINE PAIN RELIEF) 4 % external patch 1 patch as needed Externally Once a day   Yes Mino Berry MD   Wound Dressings (ZENIFOAM GENTLE 9\"X9\"/SACRAL) PADS Apply 1 each topically daily 10/16/23  Yes Santo Pratt APRN - CNP   nystatin (MYCOSTATIN) 480311 UNIT/GM powder Apply 3 times daily. 10/16/23  Yes Santo Pratt APRN - CNP   metoprolol succinate (TOPROL XL) 25 MG extended release tablet Take 1 tablet by mouth every evening Hold if BP bellow 115/65. Stop metoprolol tartrate 8/10/23  Yes Kayleigh Cardoso MD   hydrOXYzine HCl (ATARAX) 50 MG tablet Take 1 tablet by mouth daily as needed for Anxiety 8/10/23  Yes Kayleigh Cardoso MD   albuterol (PROVENTIL) (2.5 MG/3ML) 0.083% nebulizer solution Take 3 mLs by nebulization every 6 hours as needed for Wheezing or Shortness of Breath 8/9/23  Yes Kayleigh Cardoso MD   albuterol sulfate HFA (PROAIR HFA) 108 (90 Base) MCG/ACT inhaler Inhale 2 puffs into the lungs every 6 hours as needed for Wheezing or Shortness of Breath (cough) 7/19/23  Yes Kayleigh Cardoso MD   levothyroxine (SYNTHROID) 25 MCG tablet Take 1 tablet by mouth every morning (before breakfast) without no other meds for 30 minutes, take when you first wake up and you are still bed 7/13/23  Yes Kayleigh Cardoso MD

## 2024-01-11 NOTE — PROGRESS NOTES
NEPHROLOGY PROGRESS NOTE    Patient :  Lupe Ojeda; 73 y.o. MRN# [id number]  Location:  2013/2013-01  Attending:  Ludwig Daniel MD  Admit Date:  1/8/2024   Hospital Day: 3      Reason for Consult:        Chief Complaint:  AMS  History Obtained From:  patient, electronic medical record.    Subjective/interval hx:    Patient seen and examined during dialysis, remains confused  Patient  repeat the same sentences.  BP stable    History of Present Illness:    This is a 73 y.o. female with a significant past medical history of Cerebrovascular accident, atrial fibrillation status post CABG and recent PCI to left circumflex November 2023,  combined diastolic and systolic heart failure, essential hypertension, partial deafness and  ESRD on hemodialysis Tuesday Thursday Saturday at [facility] dialysis unit under my care.Patient had a recent Hospitalization for UTI/PNA and was discharged on 1/6/24.  Patient came back again to the hospital on 1/8/24 with c/o altered mental status, restlessness, confusion.  UA moderate Hb, small L.E  CT head No acute abnormality.      Past Medical History:        Diagnosis Date    Acute CVA (cerebrovascular accident) ([facility]ly [facility]) 07/25/2020    Acute kidney injury superimposed on CKD ([facility]) 06/08/2022    Acute on chronic combined systolic (congestive) and diastolic (congestive) heart failure ([facility]) 02/06/2022    Acute respiratory failure with hypoxia and hypercapnia (Formerly [facility])     JOY (acute kidney injury) ([facility]ly [facility]) 01/15/2022    Arthritis     Asthma     Atrial fibrillation, unspecified type ([facility]ly [facility])     Bilateral lower extremity edema 04/20/2022    Bilateral pleural effusion 11/20/2023    Bradycardia 06/12/2022    Chronic diastolic congestive heart failure ([facility]) 02/20/2020    Chronic ulcer of left leg with fat layer exposed ([facility]ly [facility]) 06/21/2022    CKD stage 4 secondary to hypertension ([facility]ly [facility]) 02/20/2020    Dependence on renal dialysis (Formerly [facility])     Tues, Th, Sat    ESRD (end stage renal disease) (Formerly [facility])     Essential  mg, BID PRN  mirtazapine (REMERON) tablet 15 mg, Nightly  mupirocin (BACTROBAN) 2 % ointment, Daily  miconazole (MICOTIN) 2 % powder, BID  polyethylene glycol (GLYCOLAX) packet 17 g, Daily  tiotropium (SPIRIVA RESPIMAT) 2.5 MCG/ACT inhaler 2 puff, QAM AC  sodium chloride flush 0.9 % injection 5-40 mL, 2 times per day  sodium chloride flush 0.9 % injection 10 mL, PRN  0.9 % sodium chloride infusion, PRN  heparin (porcine) injection 5,000 Units, 3 times per day  ondansetron (ZOFRAN-ODT) disintegrating tablet 4 mg, Q8H PRN   Or  ondansetron (ZOFRAN) injection 4 mg, Q6H PRN  acetaminophen (TYLENOL) tablet 650 mg, Q6H PRN   Or  acetaminophen (TYLENOL) suppository 650 mg, Q6H PRN  0.9 % sodium chloride infusion, Continuous  cetirizine (ZYRTEC) tablet 5 mg, Daily  anticoagulant sodium citrate 4 % injection 1.9 mL, PRN  anticoagulant sodium citrate 4 % injection 1.9 mL, PRN  dexmedeTOMIDine (PRECEDEX) 400 mcg in sodium chloride 0.9 % 100 mL infusion, Continuous        Allergies:  Latex, Aleve [naproxen sodium], Apixaban, Naproxen, Pioglitazone, Claritin [loratadine], Keflex [cephalexin], Adhesive tape, Keppra [levetiracetam], and Lisinopril    Social History:   Social History     Socioeconomic History    Marital status:      Spouse name: Not on file    Number of children: 6    Years of education: Not on file    Highest education level: Not on file   Occupational History    Not on file   Tobacco Use    Smoking status: Former     Current packs/day: 0.00     Average packs/day: 0.3 packs/day for 10.0 years (2.5 ttl pk-yrs)     Types: Cigarettes     Start date: 1990     Quit date: 2000     Years since quittin.0     Passive exposure: Past    Smokeless tobacco: Never   Vaping Use    Vaping Use: Never used   Substance and Sexual Activity    Alcohol use: Not Currently     Alcohol/week: 0.0 standard drinks of alcohol    Drug use: No    Sexual activity: Yes     Partners: Female   Other Topics Concern    Not on file

## 2024-01-11 NOTE — PROGRESS NOTES
Not on file     Social Determinants of Health     Financial Resource Strain: Low Risk  (12/1/2023)    Overall Financial Resource Strain (CARDIA)     Difficulty of Paying Living Expenses: Not hard at all   Recent Concern: Financial Resource Strain - Medium Risk (11/17/2023)    Overall Financial Resource Strain (CARDIA)     Difficulty of Paying Living Expenses: Somewhat hard   Food Insecurity: No Food Insecurity (1/9/2024)    Hunger Vital Sign     Worried About Running Out of Food in the Last Year: Never true     Ran Out of Food in the Last Year: Never true   Recent Concern: Food Insecurity - Food Insecurity Present (11/17/2023)    Hunger Vital Sign     Worried About Running Out of Food in the Last Year: Sometimes true     Ran Out of Food in the Last Year: Sometimes true   Transportation Needs: No Transportation Needs (1/9/2024)    PRAPARE - Transportation     Lack of Transportation (Medical): No     Lack of Transportation (Non-Medical): No   Physical Activity: Inactive (11/25/2022)    Exercise Vital Sign     Days of Exercise per Week: 0 days     Minutes of Exercise per Session: 0 min   Stress: Not on file   Social Connections: Not on file   Intimate Partner Violence: Not on file   Housing Stability: Low Risk  (1/9/2024)    Housing Stability Vital Sign     Unable to Pay for Housing in the Last Year: No     Number of Places Lived in the Last Year: 1     Unstable Housing in the Last Year: No           ROS:  [x] All negative/unchanged except if checked. Explain positive(checked items) below:  [] Constitutional  [] Eyes  [] Ear/Nose/Mouth/Throat  [] Respiratory  [] CV  [] GI  []   [] Musculoskeletal  [] Skin/Breast  [] Neurological  [] Endocrine  [] Heme/Lymph  [] Allergic/Immunologic    MEDICATIONS:    Current Facility-Administered Medications:     dexAMETHasone (DECADRON) injection 2 mg, 2 mg, IntraVENous, Q8H, Ludwig Daniel MD    LORazepam (ATIVAN) injection 2 mg, 2 mg, IntraVENous, Once PRN, Ludwig Daniel  IntraCATHeter, PRN, Fan Novak MD    anticoagulant sodium citrate 4 % injection 1.9 mL, 1.9 mL, IntraCATHeter, Imam GARVEY Nabeel Muhammad, MD    dexmedeTOMIDine (PRECEDEX) 400 mcg in sodium chloride 0.9 % 100 mL infusion, 0.1-1.5 mcg/kg/hr, IntraVENous, Continuous, Ludwig Daniel MD, Last Rate: 20.8 mL/hr at 01/11/24 0901, 1.4 mcg/kg/hr at 01/11/24 0901      Examination:  BP (!) 142/73   Pulse 69   Temp 97.4 °F (36.3 °C)   Resp 18   Ht 1.524 m (5')   Wt 60.1 kg (132 lb 7.9 oz)   SpO2 95%   BMI 25.88 kg/m²     Mental Status Examination:    Level of consciousness:  within normal limits   Appearance:  fair grooming and fair hygiene  Behavior/Motor:  no abnormalities noted  Attitude toward examiner:  did not pay much attention to examiner other than to answer a question between telling a story and poor eye contact  Speech:  spontaneous, normal volume, hyperverbal, interrupting, and dysarthric   Mood: within normal limits  Affect:  mood congruent  Thought processes:  overabundance of ideas and tangential   Thought content:  Homicidal ideation - none  Suicidal Ideation:  -  Delusions:  believes she is somewhere she is not  Perceptual Disturbance:  illusions and visual  Cognition:  disoriented   Concentration poor  Insight poor   Judgement poor     ASSESSMENT:   Patient symptoms are:  [] Well controlled  [] Improving  [] Worsening  [x] No change      Diagnosis:  Principal Problem:    Altered mental status  Active Problems:    Permanent atrial fibrillation (HCC)    Ischemic cardiomyopathy    Encephalopathy acute    History of end stage renal disease  Resolved Problems:    * No resolved hospital problems. *    Dementia   UTI, ESRD on dialysis (last today)   Delirium    LABS:    Recent Labs     01/09/24  0851 01/10/24  0433 01/11/24  0509   WBC 6.8 3.8 4.9   HGB 11.5* 11.2* 13.0   * 108* 136*     Recent Labs     01/09/24  0851 01/10/24  0433 01/11/24  0509    135 136   K 3.9 4.1 4.4   CL 99  contributing to this tello like picture.  Her risperidone dose has been increased to 1 mg twice daily.        PLAN  Medications as noted above  Attempt to develop insight  Psycho-education conducted.  Supportive Therapy conducted.  Follow-up daily while on inpatient unit    Electronically signed by LESLIE BAUTISTA MD on 1/11/24 at 6:18 PM EST

## 2024-01-11 NOTE — PROGRESS NOTES
[facility]    Date: 2024  Patient Name: Lupe Ojeda        : 1950       [] Pt Refusal           [x] Pt Unavailable due to:      Pt is on dialysis this am, also with poor cognition      Lisa Olivas, OT,   Date: 2024

## 2024-01-11 NOTE — PROGRESS NOTES
Patient taken to CT scan after given IV haldol. Patient still restless and agitated. Writer contacted Dr. Daniel for additional orders, 2mg IV ativan x1 given in MRI. Patient currently in MRI scanner. VSS. HR 84. SpO2 97% on room air.

## 2024-01-11 NOTE — PROGRESS NOTES
Pulmonary Progress Note  [facility]      Patient - Lupe Ojeda,  Age - 73 y.o.    - 1950      Room Number -    MRN -  [id number]   [facility] # - [id number]  Date of Admission -  2024  8:24 PM      Consulting Service/Physician   Consulting - Ludwig Daniel MD  Primary Care Physician - Kayleigh Cardoso MD     SUBJECTIVE   Patient was seen with daughter Svitlana present.  The patient was very sedated but was not in any distress.  Patient's requiring Precedex.  Patient was given a benzodiazepine to tolerate the MRI.  MRI is completed of the brain.  Results pending    OBJECTIVE   VITALS    height is 1.524 m (5') and weight is 58.1 kg (128 lb 1.4 oz). Her temperature is 97.3 °F (36.3 °C). Her blood pressure is 174/76 (abnormal) and her pulse is 76. Her respiration is 18 and oxygen saturation is 98%.     Body mass index is 25.02 kg/m².  Temperature Range: Temp: 97.3 °F (36.3 °C) Temp  Av.7 °F (36.5 °C)  Min: 97.3 °F (36.3 °C)  Max: 98.4 °F (36.9 °C)  BP Range:  Systolic (24hrs), Av , Min:121 , Max:178     Diastolic (24hrs), Av, Min:38, Max:137    Pulse Range: Pulse  Av  Min: 58  Max: 90  Respiration Range: Resp  Av.2  Min: 0  Max: 35  Current Pulse Ox::  SpO2: 98 %  24HR Pulse Ox Range:  SpO2  Av.1 %  Min: 90 %  Max: 98 %  Oxygen Amount and Delivery:      Wt Readings from Last 3 Encounters:   24 58.1 kg (128 lb 1.4 oz)   24 66 kg (145 lb 8.1 oz)   23 56.7 kg (125 lb)       I/O (24 Hours)    Intake/Output Summary (Last 24 hours) at 2024 1709  Last data filed at 2024 1501  Gross per 24 hour   Intake 1339.11 ml   Output 2500 ml   Net -1160.89 ml       EXAM     General Appearance  Awake, alert, oriented, in no acute distress  HEENT - normocephalic, atraumatic.  Neck - Supple,  trachea midline   Lungs -coarse breath sounds no crackles rales or wheezes  Heart Exam:PMI normal. No lifts, heaves, or thrills. RRR. No murmurs,  List   Diagnosis    Vitamin D deficiency    Venous insufficiency of both lower extremities    Type 2 diabetes mellitus with chronic kidney disease on chronic dialysis, without long-term current use of insulin (HCC)    Atherosclerosis of coronary artery bypass graft of native heart without angina pectoris    Hypertension, essential    Iron deficiency anemia    Colonoscopy refused    Bilateral hearing loss    COPD (chronic obstructive pulmonary disease) (HCC)    Gout with tophi    Dyslipidemia    Metabolic encephalopathy    E. coli UTI    Meningioma (HCC)    Kidney stones    Altered mental status    Delirium due to another medical condition    Diverticulosis    COVID-19 vaccination declined    Chronic venous stasis dermatitis of both lower extremities    Family history of colon cancer    Family history of pancreatic cancer    Mild malnutrition (HCC)    Decreased strength, endurance, and mobility    Lymphedema    PVD (peripheral vascular disease) (HCC)    Elevated troponin    ESRD on dialysis (HCC)    Hypertensive heart and chronic kidney disease with heart failure and with stage 5 chronic kidney disease, or end stage renal disease (HCC)    Gastroesophageal reflux disease    Moderate anxiety    Other age-related cataract    History of GI bleed    Chronic combined systolic and diastolic CHF (congestive heart failure) (HCC)    Degenerative disease of nervous system, unspecified (HCC)    Allergic rhinitis    Acquired hypothyroidism    Type 2 diabetes mellitus with polyneuropathy (HCC)    Chronic respiratory failure with hypoxia (HCC)    Other osteoporosis without current pathological fracture    Moderate vascular dementia with anxiety (HCC)    Recurrent falls while walking    Persistent atrial fibrillation (HCC)    Slow transit constipation    Right temporal lobe mass    History of type 2 diabetes mellitus    History of chronic atrial fibrillation    Acute encephalopathy    Bacteremia    Actinomyces infection    ESRD (end

## 2024-01-11 NOTE — PROGRESS NOTES
NEUROLOGY INPATIENT PROGRESS NOTE    1/11/2024         Subjective: Lupe Ojeda is a  73 y.o. female admitted on 1/8/2024 with Altered mental status [R41.82]  Altered mental status, unspecified altered mental status type [R41.82]    Chart reviewed and discussed with caregivers.  Patient was examined.  Patient just returned from MRI brain and patient has received sedation for MRI brain.    Briefly, this is a  73 y.o. female with hx of A-fib, meningioma was admitted on 1/8/2024 with altered mentation.  Neurology consultation is requested for worsening confusion with nonsensical speech, bizarre behaviors and agitation since after discharged few days ago from the hospital.  Patient was evaluated by neurology on 12/31/2023 for confusion from UTI and pneumonic process.  Patient was noted to have hyperactive delirium with a gradual decline in cognitive status over the past 1 year.  She has had right temporal meningioma and was also on antiseizure medications for concern of seizure risk.  She also had abnormal photoparoxysmal response with photosensitivity in right temporal occipital region.  Initially patient was on Lamictal but it was switched to Vimpat.  Her history is significant for CAD s/p CABG, heart failure with low EF and prior CVA and COPD, ESRD on hemodialysis.  She also had history of atrial fibrillation but not on anticoagulation due to bleeding risk.    No current facility-administered medications on file prior to encounter.     Current Outpatient Medications on File Prior to Encounter   Medication Sig Dispense Refill    lacosamide (VIMPAT) 100 MG TABS tablet Take 1 tablet by mouth 2 times daily for 60 days. Max Daily Amount: 200 mg 60 tablet 1    furosemide (LASIX) 80 MG tablet Take 1 tablet by mouth every other day Water pill, does not take on dialysis days. 90 tablet 3    amiodarone (PACERONE) 100 MG tablet Take 1 tablet by mouth every morning 90 tablet 3    aspirin 81 MG EC tablet Take 1 tablet by

## 2024-01-11 NOTE — PLAN OF CARE
Problem: Discharge Planning  Goal: Discharge to home or other facility with appropriate resources  Outcome: Progressing  Flowsheets (Taken 1/10/2024 2000)  Discharge to home or other facility with appropriate resources:   Identify barriers to discharge with patient and caregiver   Arrange for needed discharge resources and transportation as appropriate   Refer to discharge planning if patient needs post-hospital services based on physician order or complex needs related to functional status, cognitive ability or social support system     Problem: Skin/Tissue Integrity  Goal: Absence of new skin breakdown  Description: 1.  Monitor for areas of redness and/or skin breakdown  2.  Assess vascular access sites hourly  3.  Every 4-6 hours minimum:  Change oxygen saturation probe site  4.  Every 4-6 hours:  If on nasal continuous positive airway pressure, respiratory therapy assess nares and determine need for appliance change or resting period.  Outcome: Progressing     Problem: Safety - Adult  Goal: Free from fall injury  Outcome: Progressing     Problem: Confusion  Goal: Confusion, delirium, dementia, or psychosis is improved or at baseline  Description: INTERVENTIONS:  1. Assess for possible contributors to thought disturbance, including medications, impaired vision or hearing, underlying metabolic abnormalities, dehydration, psychiatric diagnoses, and notify attending LIP  2. [place] high risk fall precautions, as indicated  3. Provide frequent short contacts to provide reality reorientation, refocusing and direction  4. Decrease environmental stimuli, including noise as appropriate  5. Monitor and intervene to maintain adequate nutrition, hydration, elimination, sleep and activity  6. If unable to ensure safety without constant attention obtain sitter and review sitter guidelines with assigned personnel  7. Initiate Psychosocial CNS and Spiritual Care consult, as indicated  Outcome: Progressing     Problem: Risk  for Elopement  Goal: Patient will not exit the unit/facility without proper excort  Outcome: Progressing  Flowsheets  Taken 1/11/2024 0400 by Sarah Lane RN  Nursing Interventions for Elopement Risk:   Assist with personal care needs such as toileting, eating, dressing, as needed to reduce the risk of wandering   Collaborate with family members/caregivers to mitigate the elopement risk   Collaborate with treatment team for drug withdrawal symptoms treatment   Collaborate with treatment team for nicotine replacement   Communicate/escalate to charge nurse the risk of elopement   Communicate/escalate to /other team member the risk of elopement   Communicate/escalate to nursing supervisor the risk of elopement   Communicate to physician the risk for elopement  Taken 1/11/2024 0000 by Sarah Lane RN  Nursing Interventions for Elopement Risk:   Assist with personal care needs such as toileting, eating, dressing, as needed to reduce the risk of wandering   Collaborate with family members/caregivers to mitigate the elopement risk   Collaborate with treatment team for drug withdrawal symptoms treatment   Collaborate with treatment team for nicotine replacement   Communicate/escalate to charge nurse the risk of elopement   Communicate/escalate to /other team member the risk of elopement   Communicate/escalate to nursing supervisor the risk of elopement   Communicate to physician the risk for elopement  Taken 1/10/2024 2000 by Sarah Lane RN  Nursing Interventions for Elopement Risk:   Assist with personal care needs such as toileting, eating, dressing, as needed to reduce the risk of wandering   Collaborate with family members/caregivers to mitigate the elopement risk   Collaborate with treatment team for drug withdrawal symptoms treatment   Collaborate with treatment team for nicotine replacement   Communicate/escalate to charge nurse the risk of elopement   Communicate/escalate to

## 2024-01-11 NOTE — PROGRESS NOTES
01/11/24 1518   Encounter Summary   Encounter Overview/Reason  Attempted Encounter   Service Provided For: Patient not available  (PT not in room)

## 2024-01-12 ENCOUNTER — APPOINTMENT (OUTPATIENT)
Dept: NEUROLOGY | Age: 74
DRG: 080 | End: 2024-01-12
Payer: MEDICARE

## 2024-01-12 LAB
ABSOLUTE BANDS: 0.13 K/UL (ref 0–1)
ALBUMIN SERPL-MCNC: 3 G/DL (ref 3.5–5.2)
ALP SERPL-CCNC: 93 U/L (ref 35–104)
ALT SERPL-CCNC: 10 U/L (ref 5–33)
ANION GAP SERPL CALCULATED.3IONS-SCNC: 15 MMOL/L (ref 9–17)
AST SERPL-CCNC: 12 U/L
BANDS: 2 % (ref 0–10)
BASOPHILS # BLD: 0 K/UL (ref 0–0.2)
BASOPHILS NFR BLD: 0 % (ref 0–2)
BILIRUB SERPL-MCNC: 0.4 MG/DL (ref 0.3–1.2)
BUN SERPL-MCNC: 22 MG/DL (ref 8–23)
CALCIUM SERPL-MCNC: 8.5 MG/DL (ref 8.6–10.4)
CHLORIDE SERPL-SCNC: 103 MMOL/L (ref 98–107)
CO2 SERPL-SCNC: 19 MMOL/L (ref 20–31)
CREAT SERPL-MCNC: 2.6 MG/DL (ref 0.5–0.9)
EOSINOPHIL # BLD: 0 K/UL (ref 0–0.4)
EOSINOPHILS RELATIVE PERCENT: 0 % (ref 0–4)
ERYTHROCYTE [DISTWIDTH] IN BLOOD BY AUTOMATED COUNT: 18.4 % (ref 11.5–14.9)
GFR SERPL CREATININE-BSD FRML MDRD: 19 ML/MIN/1.73M2
GLUCOSE SERPL-MCNC: 122 MG/DL (ref 70–99)
HCT VFR BLD AUTO: 39.5 % (ref 36–46)
HGB BLD-MCNC: 12.5 G/DL (ref 12–16)
LYMPHOCYTES NFR BLD: 0.33 K/UL (ref 1–4.8)
LYMPHOCYTES RELATIVE PERCENT: 5 % (ref 24–44)
MCH RBC QN AUTO: 29.4 PG (ref 26–34)
MCHC RBC AUTO-ENTMCNC: 31.7 G/DL (ref 31–37)
MCV RBC AUTO: 92.6 FL (ref 80–100)
METAMYELOCYTES ABSOLUTE COUNT: 0.2 K/UL
METAMYELOCYTES: 3 %
MONOCYTES NFR BLD: 0.2 K/UL (ref 0.1–1.3)
MONOCYTES NFR BLD: 3 % (ref 1–7)
MORPHOLOGY: ABNORMAL
NEUTROPHILS NFR BLD: 87 % (ref 36–66)
NEUTS SEG NFR BLD: 5.64 K/UL (ref 1.3–9.1)
PLATELET # BLD AUTO: 117 K/UL (ref 150–450)
PMV BLD AUTO: 7.8 FL (ref 6–12)
POTASSIUM SERPL-SCNC: 4.1 MMOL/L (ref 3.7–5.3)
PROT SERPL-MCNC: 5.1 G/DL (ref 6.4–8.3)
RBC # BLD AUTO: 4.27 M/UL (ref 4–5.2)
SODIUM SERPL-SCNC: 137 MMOL/L (ref 135–144)
WBC OTHER # BLD: 6.5 K/UL (ref 3.5–11)

## 2024-01-12 PROCEDURE — 2500000003 HC RX 250 WO HCPCS

## 2024-01-12 PROCEDURE — 36415 COLL VENOUS BLD VENIPUNCTURE: CPT

## 2024-01-12 PROCEDURE — 2580000003 HC RX 258: Performed by: NURSE PRACTITIONER

## 2024-01-12 PROCEDURE — 2500000003 HC RX 250 WO HCPCS: Performed by: INTERNAL MEDICINE

## 2024-01-12 PROCEDURE — 94761 N-INVAS EAR/PLS OXIMETRY MLT: CPT

## 2024-01-12 PROCEDURE — 2060000000 HC ICU INTERMEDIATE R&B

## 2024-01-12 PROCEDURE — 99291 CRITICAL CARE FIRST HOUR: CPT | Performed by: INTERNAL MEDICINE

## 2024-01-12 PROCEDURE — 6370000000 HC RX 637 (ALT 250 FOR IP): Performed by: PSYCHIATRY & NEUROLOGY

## 2024-01-12 PROCEDURE — 6360000002 HC RX W HCPCS: Performed by: INTERNAL MEDICINE

## 2024-01-12 PROCEDURE — 6360000002 HC RX W HCPCS: Performed by: NURSE PRACTITIONER

## 2024-01-12 PROCEDURE — C9254 INJECTION, LACOSAMIDE: HCPCS | Performed by: NURSE PRACTITIONER

## 2024-01-12 PROCEDURE — 99232 SBSQ HOSP IP/OBS MODERATE 35: CPT | Performed by: PSYCHIATRY & NEUROLOGY

## 2024-01-12 PROCEDURE — 85025 COMPLETE CBC W/AUTO DIFF WBC: CPT

## 2024-01-12 PROCEDURE — 95816 EEG AWAKE AND DROWSY: CPT

## 2024-01-12 PROCEDURE — 2580000003 HC RX 258: Performed by: INTERNAL MEDICINE

## 2024-01-12 PROCEDURE — 80053 COMPREHEN METABOLIC PANEL: CPT

## 2024-01-12 PROCEDURE — 6360000002 HC RX W HCPCS

## 2024-01-12 PROCEDURE — 95819 EEG AWAKE AND ASLEEP: CPT | Performed by: PSYCHIATRY & NEUROLOGY

## 2024-01-12 PROCEDURE — 99233 SBSQ HOSP IP/OBS HIGH 50: CPT | Performed by: PSYCHIATRY & NEUROLOGY

## 2024-01-12 RX ADMIN — DEXAMETHASONE SODIUM PHOSPHATE 2 MG: 4 INJECTION INTRA-ARTICULAR; INTRALESIONAL; INTRAMUSCULAR; INTRAVENOUS; SOFT TISSUE at 05:36

## 2024-01-12 RX ADMIN — LACOSAMIDE 100 MG: 10 INJECTION INTRAVENOUS at 20:39

## 2024-01-12 RX ADMIN — HEPARIN SODIUM 5000 UNITS: 5000 INJECTION INTRAVENOUS; SUBCUTANEOUS at 05:35

## 2024-01-12 RX ADMIN — ANTI-FUNGAL POWDER MICONAZOLE NITRATE TALC FREE: 1.42 POWDER TOPICAL at 22:41

## 2024-01-12 RX ADMIN — HYDRALAZINE HYDROCHLORIDE 10 MG: 20 INJECTION INTRAMUSCULAR; INTRAVENOUS at 05:36

## 2024-01-12 RX ADMIN — DEXMEDETOMIDINE HYDROCHLORIDE 1.2 MCG/KG/HR: 400 INJECTION INTRAVENOUS at 07:12

## 2024-01-12 RX ADMIN — MUPIROCIN: 20 OINTMENT TOPICAL at 09:32

## 2024-01-12 RX ADMIN — DEXAMETHASONE SODIUM PHOSPHATE 2 MG: 4 INJECTION INTRA-ARTICULAR; INTRALESIONAL; INTRAMUSCULAR; INTRAVENOUS; SOFT TISSUE at 22:38

## 2024-01-12 RX ADMIN — RISPERIDONE 1 MG: 1 TABLET, ORALLY DISINTEGRATING ORAL at 09:33

## 2024-01-12 RX ADMIN — HEPARIN SODIUM 5000 UNITS: 5000 INJECTION INTRAVENOUS; SUBCUTANEOUS at 22:38

## 2024-01-12 RX ADMIN — DEXMEDETOMIDINE HYDROCHLORIDE 0.7 MCG/KG/HR: 400 INJECTION INTRAVENOUS at 20:43

## 2024-01-12 RX ADMIN — DEXMEDETOMIDINE HYDROCHLORIDE 1.2 MCG/KG/HR: 400 INJECTION INTRAVENOUS at 01:50

## 2024-01-12 RX ADMIN — DEXMEDETOMIDINE HYDROCHLORIDE 1 MCG/KG/HR: 400 INJECTION INTRAVENOUS at 12:54

## 2024-01-12 RX ADMIN — ANTI-FUNGAL POWDER MICONAZOLE NITRATE TALC FREE: 1.42 POWDER TOPICAL at 03:19

## 2024-01-12 RX ADMIN — ANTI-FUNGAL POWDER MICONAZOLE NITRATE TALC FREE: 1.42 POWDER TOPICAL at 09:32

## 2024-01-12 RX ADMIN — HEPARIN SODIUM 5000 UNITS: 5000 INJECTION INTRAVENOUS; SUBCUTANEOUS at 13:00

## 2024-01-12 RX ADMIN — SODIUM CHLORIDE: 9 INJECTION, SOLUTION INTRAVENOUS at 17:47

## 2024-01-12 RX ADMIN — LACOSAMIDE 100 MG: 10 INJECTION INTRAVENOUS at 09:30

## 2024-01-12 RX ADMIN — DEXAMETHASONE SODIUM PHOSPHATE 2 MG: 4 INJECTION INTRA-ARTICULAR; INTRALESIONAL; INTRAMUSCULAR; INTRAVENOUS; SOFT TISSUE at 14:16

## 2024-01-12 NOTE — PROGRESS NOTES
Pulmonary Progress Note  [facility]      Patient - Lupe Ojeda,  Age - 73 y.o.    - 1950      Room Number -    MRN -  [id number]   [facility] # - [id number]  Date of Admission -  2024  8:24 PM    Consulting Service/Physician   Consulting - Ludwig Daniel MD  Primary Care Physician - Kayleigh Cardoso MD     SUBJECTIVE   Patient is about ready to undergoing EEG.  Breathing wise she appears stable.  No distress.  On Precedex 0.9    OBJECTIVE   VITALS    height is 1.524 m (5') and weight is 58.3 kg (128 lb 8.5 oz). Her axillary temperature is 97.3 °F (36.3 °C). Her blood pressure is 145/50 (abnormal) and her pulse is 69. Her respiration is 17 and oxygen saturation is 95%.     Body mass index is 25.1 kg/m².  Temperature Range: Temp: 97.3 °F (36.3 °C) Temp  Av.7 °F (36.5 °C)  Min: 97.3 °F (36.3 °C)  Max: 98.3 °F (36.8 °C)  BP Range:  Systolic (24hrs), Av , Min:110 , Max:168     Diastolic (24hrs), Av, Min:29, Max:92    Pulse Range: Pulse  Av.6  Min: 61  Max: 100  Respiration Range: Resp  Av.7  Min: 4  Max: 38  Current Pulse Ox::  SpO2: 95 %  24HR Pulse Ox Range:  SpO2  Av.9 %  Min: 92 %  Max: 98 %  Oxygen Amount and Delivery:      Wt Readings from Last 3 Encounters:   24 58.3 kg (128 lb 8.5 oz)   24 66 kg (145 lb 8.1 oz)   23 56.7 kg (125 lb)       I/O (24 Hours)    Intake/Output Summary (Last 24 hours) at 2024 1539  Last data filed at 2024 1142  Gross per 24 hour   Intake 939.98 ml   Output --   Net 939.98 ml       EXAM     General Appearance  Awake, alert, oriented, in no acute distress  HEENT - normocephalic, atraumatic.  Neck - Supple,  trachea midline   Lungs -coarse breath sounds no crackles rales or wheezes  Heart Exam:PMI normal. No lifts, heaves, or thrills. RRR. No murmurs, clicks, gallops, or rubs  Abdomen Exam: Abdomen soft, non-tender. BS normal.  Extremity Exam: No signs of cyanosis    MEDS       dexAMETHasone  2 mg IntraVENous Q8H    risperiDONE  1 mg Oral BID    lacosamide (VIMPAT) 100 mg in sodium chloride 0.9 % 60 mL IVPB  100 mg IntraVENous BID    amiodarone  100 mg Oral QAM    aspirin  81 mg Oral Daily    atorvastatin  40 mg Oral QPM    clopidogrel  75 mg Oral Daily    famotidine  10 mg Oral QAM AC    budesonide-formoterol  2 puff Inhalation BID RT    [Held by provider] furosemide  80 mg Oral Every Other Day    levothyroxine  25 mcg Oral QAM AC    lidocaine  1 patch Topical Daily    metoprolol succinate  25 mg Oral QPM    mirtazapine  15 mg Oral Nightly    mupirocin   Topical Daily    miconazole   Topical BID    polyethylene glycol  17 g Oral Daily    tiotropium  2 puff Inhalation QAM AC    sodium chloride flush  5-40 mL IntraVENous 2 times per day    heparin (porcine)  5,000 Units SubCUTAneous 3 times per day    cetirizine  5 mg Oral Daily      sodium chloride      sodium chloride 10 mL/hr at 01/12/24 1118    dexmedeTOMIDine 1 mcg/kg/hr (01/12/24 1254)     hydrALAZINE, albuterol, albuterol sulfate HFA, hydrOXYzine HCl, midodrine, sodium chloride flush, sodium chloride, ondansetron **OR** ondansetron, acetaminophen **OR** acetaminophen, anticoagulant sodium citrate, anticoagulant sodium citrate    LABS   CBC   Recent Labs     01/12/24  0502   WBC 6.5   HGB 12.5   HCT 39.5   MCV 92.6   *     BMP:   Lab Results   Component Value Date/Time     01/12/2024 05:02 AM    K 4.1 01/12/2024 05:02 AM     01/12/2024 05:02 AM    CO2 19 01/12/2024 05:02 AM    BUN 22 01/12/2024 05:02 AM    LABALBU 3.0 01/12/2024 05:02 AM    CREATININE 2.6 01/12/2024 05:02 AM    CALCIUM 8.5 01/12/2024 05:02 AM    GFRAA 14 09/19/2022 05:40 AM    LABGLOM 19 01/12/2024 05:02 AM     ABGs:  Lab Results   Component Value Date/Time    PHART 7.524 07/20/2022 04:08 PM    PO2ART 68.7 07/20/2022 04:08 PM    DDJ6QPI 33.6 07/20/2022 04:08 PM      Lab Results   Component Value Date/Time    MODE [facility] 07/06/2022 04:40 AM     Ionized

## 2024-01-12 NOTE — PROGRESS NOTES
NEPHROLOGY PROGRESS NOTE    Patient :  Lupe Ojeda; 73 y.o. MRN# [id number]  Location:  2013/2013-01  Attending:  Ludwig Daniel MD  Admit Date:  1/8/2024   Hospital Day: 4      Reason for Consult:        Chief Complaint:  AMS  History Obtained From:  patient, electronic medical record.    Subjective/interval hx:    Patient seen and examined today, sleeping comfortably, had dialysis yesterday  BP stable    History of Present Illness:    This is a 73 y.o. female with a significant past medical history of Cerebrovascular accident, atrial fibrillation status post CABG and recent PCI to left circumflex November 2023,  combined diastolic and systolic heart failure, essential hypertension, partial deafness and  ESRD on hemodialysis Tuesday Thursday Saturday at [facility] dialysis unit under my care.Patient had a recent Hospitalization for UTI/PNA and was discharged on 1/6/24.  Patient came back again to the hospital on 1/8/24 with c/o altered mental status, restlessness, confusion.  UA moderate Hb, small L.E  CT head No acute abnormality.      Past Medical History:        Diagnosis Date    Acute CVA (cerebrovascular accident) (Formerly [facility]) 07/25/2020    Acute kidney injury superimposed on CKD (Formerly [facility]) 06/08/2022    Acute on chronic combined systolic (congestive) and diastolic (congestive) heart failure (Formerly [facility]) 02/06/2022    Acute respiratory failure with hypoxia and hypercapnia (Formerly [facility])     JOY (acute kidney injury) (Formerly [facility]) 01/15/2022    Arthritis     Asthma     Atrial fibrillation, unspecified type (Formerly [facility])     Bilateral lower extremity edema 04/20/2022    Bilateral pleural effusion 11/20/2023    Bradycardia 06/12/2022    Chronic diastolic congestive heart failure (Formerly [facility]) 02/20/2020    Chronic ulcer of left leg with fat layer exposed (Formerly [facility]) 06/21/2022    CKD stage 4 secondary to hypertension (Formerly [facility]) 02/20/2020    Dependence on renal dialysis (Formerly [facility])     Tues, Th, Sat    ESRD (end stage renal disease) (Formerly [facility])     Essential hypertension 07/25/2020     Vitals for the past 96 hrs (Last 3 readings):   Weight   01/12/24 0541 58.3 kg (128 lb 8.5 oz)   01/11/24 1225 58.1 kg (128 lb 1.4 oz)   01/11/24 0600 60.1 kg (132 lb 7.9 oz)       Physical Exam:  GENERAL APPEARANCE: sleeping comfortably  HEAD: normocephalic  EYES:  EOMI. Not pale, anicteric   NOSE:  No nasal discharge.      CARDIAC: Normal S1 and S2. No S3, S4 or murmurs. Rhythm is regular.  LUNGS:  diminished breath sounds. No accessory muscle use  NECK: Neck supple, non-tender  GI-soft non tender     EXTREMITIES: No edema. Peripheral pulses intact.   NEURO: Non focal      Labs:   CBC:  Recent Labs     01/10/24  0433 01/11/24  0509 01/12/24  0502   WBC 3.8 4.9 6.5   RBC 3.80* 4.38 4.27   HGB 11.2* 13.0 12.5   HCT 35.1* 41.0 39.5   MCV 92.2 93.7 92.6   MCH 29.4 29.8 29.4   MCHC 31.8 31.8 31.7   RDW 17.7* 18.2* 18.4*   * 136* 117*   MPV 8.2 9.1 7.8      BMP:   Recent Labs     01/10/24  0433 01/11/24  0509 01/12/24  0502    136 137   K 4.1 4.4 4.1   CL 97* 97* 103   CO2 25 21 19*   BUN 14 27* 22   CREATININE 2.7* 3.6* 2.6*   GLUCOSE 167* 157* 122*   CALCIUM 8.5* 8.5* 8.5*      Phosphorus:  No results for input(s): \"PHOS\" in the last 72 hours.  Magnesium: No results for input(s): \"MG\" in the last 72 hours.  Albumin:   Recent Labs     01/12/24  0502   LABALBU 3.0*       IRON:  No results for input(s): \"IRON\" in the last 72 hours.  Iron Saturation:  Invalid input(s): \"PERCENTFE\"  TIBC:  No results for input(s): \"TIBC\" in the last 72 hours.  FERRITIN:  No results for input(s): \"FERRITIN\" in the last 72 hours.  SPEP: No results for input(s): \"SPEP\" in the last 72 hours.   Recent Labs     01/12/24  0502   PROT 5.1*       Urinalysis:    No results for input(s): \"NITRU\", \"COLORU\", \"PHUR\", \"LABCAST\", \"WBCUA\", \"RBCUA\", \"MUCUS\", \"TRICHOMONAS\", \"YEAST\", \"BACTERIA\", \"CLARITYU\", \"SPECGRAV\", \"LEUKOCYTESUR\", \"UROBILINOGEN\", \"BILIRUBINUR\", \"BLOODU\", \"GLUCOSEU\", \"KETUA\", \"AMORPHOUS\" in the last 72

## 2024-01-12 NOTE — PROGRESS NOTES
[facility]   OCCUPATIONAL THERAPY MISSED TREATMENT NOTE   INPATIENT   Date: 24  Patient Name: Lupe Ojeda       Room:   MRN: [id number]   Account #: [id number]    : 1950  (73 y.o.)  Gender: female                 REASON FOR MISSED TREATMENT:  Hold treatment per nursing request   -    Hold per SHERITA Prasad due to pt not following commands and on Precedex. Occupational therapy will continue to follow.   900      Electronically signed by HELENE English on 24 at 10:25 AM EST

## 2024-01-12 NOTE — PLAN OF CARE
Problem: Discharge Planning  Goal: Discharge to home or other facility with appropriate resources  Outcome: Progressing  Flowsheets (Taken 1/11/2024 2100)  Discharge to home or other facility with appropriate resources:   Identify barriers to discharge with patient and caregiver   Arrange for needed discharge resources and transportation as appropriate   Identify discharge learning needs (meds, wound care, etc)   Arrange for interpreters to assist at discharge as needed   Refer to discharge planning if patient needs post-hospital services based on physician order or complex needs related to functional status, cognitive ability or social support system     Problem: Skin/Tissue Integrity  Goal: Absence of new skin breakdown  Description: 1.  Monitor for areas of redness and/or skin breakdown  Outcome: Progressing     Problem: Safety - Adult  Goal: Free from fall injury  Outcome: Progressing  Flowsheets (Taken 1/12/2024 0206)  Free From Fall Injury: Instruct family/caregiver on patient safety     Problem: Confusion  Goal: Confusion, delirium, dementia, or psychosis is improved or at baseline  Description: INTERVENTIONS:  1. Assess for possible contributors to thought disturbance, including medications, impaired vision or hearing, underlying metabolic abnormalities, dehydration, psychiatric diagnoses, and notify attending LIP  2. [place] high risk fall precautions, as indicated  3. Provide frequent short contacts to provide reality reorientation, refocusing and direction  4. Decrease environmental stimuli, including noise as appropriate  5. Monitor and intervene to maintain adequate nutrition, hydration, elimination, sleep and activity  6. If unable to ensure safety without constant attention obtain sitter and review sitter guidelines with assigned personnel  7. Initiate Psychosocial CNS and Spiritual Care consult, as indicated  Outcome: Progressing  Flowsheets (Taken 1/11/2024 2100)  Effect of thought disturbance  and prevent overall improvement and discharge     Problem: ABCDS Injury Assessment  Goal: Absence of physical injury  Outcome: Progressing

## 2024-01-12 NOTE — PROGRESS NOTES
BEHAVIORAL HEALTH FOLLOW-UP NOTE     1/12/2024     Patient was seen and examined in person, Chart reviewed   Patient's case discussed with staff/team    Chief Complaint: Delirium     Interim History:     The patient is a 73 y.o. female with no significant past psychiatric history and pmh of stroke, CAD status post CABG, hypertension, bilateral cataract, ESRD on HD, CHF, chronic hearing loss, history of right temporal meningioma, cognitive impairment/dementia, afib not on AC due to bleeding risk per cardiology, who is currently admitted for altered mentation. Concern for UTI, but final urine culture is negative. Several repeated failed attempts for head MRI as patient could not tolerate laying flat, got more agitated, and was screaming. Psychiatry was consulted for agitation.     Patient seen and evaluated at bedside 1/12. Noted vital signs within normal limits, elevated BP. On Precedex drip 1.2 mcg/min up from 0.6 mcg/min. Upon trying to assess orientation, she was mumbling and could not talk to her further.        The daughters report a history of depression for which she has been taking mirtazapine, along with hydroxyzine PRN for anxiety with dialysis sessions. They also report an episode of hallucinations and delusions that occurred about 20 years ago which resulted in the patient moving to [place], dying her hair black, and trying to change her identity since she believed people were coming after her. Her daughter report this happened after her psychiatrist started her on an unknown medication, but that the patient returned to her normal self after this medication was discontinued. Her daughters reported she had no apparent issues until about 2 years ago when she began having UTIs. They also report some deaths in the family which occurred around the same time, including her aunt. Her daughters also report that the patient's mother had mental health problems that included postpartum depression, for which she  Problems:    Permanent atrial fibrillation (HCC)    Ischemic cardiomyopathy    Encephalopathy acute    History of end stage renal disease    Acute encephalopathy  Resolved Problems:    * No resolved hospital problems. *    Dementia   UTI, ESRD on dialysis (last today)   Delirium    LABS:    Recent Labs     01/10/24  0433 01/11/24  0509 01/12/24  0502   WBC 3.8 4.9 6.5   HGB 11.2* 13.0 12.5   * 136* 117*       Recent Labs     01/10/24  0433 01/11/24  0509 01/12/24  0502    136 137   K 4.1 4.4 4.1   CL 97* 97* 103   CO2 25 21 19*   BUN 14 27* 22   CREATININE 2.7* 3.6* 2.6*   GLUCOSE 167* 157* 122*       Recent Labs     01/12/24  0502   BILITOT 0.4   ALKPHOS 93   AST 12   ALT 10       No results found for: \"LABAMPH\", \"BARBSCNU\", \"LABBENZ\", \"CANNAB\", \"COCAINESCRN\", \"LABMETH\", \"OPIATESCREENURINE\", \"PHENCYCLIDINESCREENURINE\", \"PPXUR\", \"ETOH\"  Lab Results   Component Value Date/Time    TSH 2.73 12/31/2023 10:04 AM     No results found for: \"LITHIUM\"  No results found for: \"VALPROATE\", \"CBMZ\"      Treatment Plan:  Reviewed the chart.  No acute overnight events. Patient was anxious last night.   On Precedex 1.2 mcg/min increased from 0.6 last night.   Neurology discontinued Lamictal, continued Vimpat for seizure disorder. MRI showed stable right sided anterior middle cranial fossa meningioma and stable vasogenic edema and mild mass effect in the anterior right temporal lobe. Possible EEG today.   On Decadron for history of right middle cranial fossa meningioma, with vasogenic edema. Steroids could contribute to worsening of tello episode she has been having. Will attempt to follow up on her when she is weaned off precedex.    Increase risperidone 1 mg BID, Seroquel discontinued.   PRN hydroxyzine for anxiety.  Continue mirtazapine nightly.   Attempt to develop insight and conduct supportive therapy.    Discussed with daughter at bedside       PSYCHOTHERAPY/COUNSELING:  [] Therapeutic interview  [x]  Supportive  [] CBT  [] Ongoing  [] Other    [x] Patient continues to need, on a daily basis, active treatment furnished directly by or requiring the supervision of inpatient psychiatric personnel        --Jerri Kerr MD, MD on 1/12/2024 at 10:09 AM    An electronic signature was used to authenticate this note.   I independently saw and evaluated the patient.  I reviewed the  documentation by the CNP  I independently saw and evaluated the patient.  I reviewed the  documentation by the CNP    .  Any additional comments or changes to the   documentation are stated below otherwise agree with assessment.      The patient was seen at bedside.  She is somewhat lethargic but is continuously mumbling.  Most of her mumbling is unintelligible.  Her daughter continues to be present at bedside.  We discussed that I do not want to increase the dose of her risperidone given her sedation.  No changes have been made to the patient's medications today.      PLAN  Medications as noted above  Attempt to develop insight  Psycho-education conducted.  Supportive Therapy conducted.  Follow-up daily while on inpatient unit    Electronically signed by LESLIE BAUTISTA MD on 1/12/24 at 4:49 PM EST

## 2024-01-12 NOTE — PROGRESS NOTES
Facts: Pt hard of hearing and poor eye sight.  Pt slept entire visit.  Spiritual Care Visit with pt's daughter- Sea.     Feelings: Sea is supportive of her mom and realistic of pt's condition    Family: Pt has three daughters and one son    Vijaya: Sea welcomed prayer for her mom     Follow-up: Writer and Pt's daughter, Sea, spoke of the importance of completing HPOA. At this time, no documents are completed.  Writer explained how a  is willing to assist in completing documents either while in the hospital or the pt can make an appointment and come to the 's office to complete.        01/12/24 1125   Encounter Summary   Encounter Overview/Reason  Spiritual/Emotional Needs   Service Provided For: Patient and family together   Referral/Consult From: Palliative Care   Support System Children   Last Encounter  01/12/24   Complexity of Encounter Moderate   Begin Time 1045   End Time  1126   Total Time Calculated 41 min   Spiritual/Emotional needs   Type Spiritual Support   Assessment/Intervention/Outcome   Assessment Coping   Intervention Active listening;Discussed belief system/Scientologist practices/vijaya;Discussed death, afterlife;Discussed illness injury and it’s impact;Discussed relationship with God;Prayer (assurance of)/[place];Sustaining Presence/Ministry of presence   Outcome Comfort;Engaged in conversation;Expressed feelings, needs, and concerns;Receptive

## 2024-01-12 NOTE — PROGRESS NOTES
[facility]   IN-PATIENT SERVICE   [facility]    HISTORY AND PHYSICAL EXAMINATION            Date:   1/12/2024  Patient name:  Lupe Ojeda  Date of admission:  1/8/2024  8:24 PM  MRN:   [id number]  Account:  [id number]  YOB: 1950  PCP:    Kayleigh Cardoso MD  Room:   2013/2013-01  Code Status:    Full Code    Chief Complaint:     Chief Complaint   Patient presents with    Altered Mental Status     Worsening today        History Obtained From:     patient, electronic medical record    History of Present Illness:     The patient is a 73 y.o.  Non- / non  female who presents with Altered Mental Status (Worsening today )   and she is admitted to the hospital for the management of altered mental status  Patient has past medical history of multiple medical problem which include coronary artery disease s/p CABG, heart failure with reduced ejection fraction, history of stroke, history of hematuria, history of A-fib not on anticoagulation due to bleeding risk, COPD, ESRD on hemodialysis, bilateral cataract, hearing disability  Patient was recently discharged from the hospital only few days ago when she was admitted with altered mental status, underwent CT head suggestive of meningioma with mass effect, patient also underwent EEG suggestive of sharp waves concerning for possible seizures, started on antiepileptics  Patient family did not want to get her discharged to SNF  Patient eventually discharged home  Patient presented again with increasing confusion, as per daughter at bedside, patient was talking gibberish,  She was started on Seroquel which was discontinued outpatient due to side effect  Denying complaints of chest pain, shortness of breath  Patient is compliant with her diet medication  Last admission was planned to get MRI done which unfortunately could not be done because of patient agitation    Worsening confusion transferred to ICU for Precedex  drip        Past Medical History:     Past Medical History:   Diagnosis Date    Acute CVA (cerebrovascular accident) ([facility]) 07/25/2020    Acute kidney injury superimposed on CKD ([facility]) 06/08/2022    Acute on chronic combined systolic (congestive) and diastolic (congestive) heart failure ([facility]) 02/06/2022    Acute respiratory failure with hypoxia and hypercapnia ([facility])     JOY (acute kidney injury) ([facility]) 01/15/2022    Arthritis     Asthma     Atrial fibrillation, unspecified type ([facility])     Bilateral lower extremity edema 04/20/2022    Bilateral pleural effusion 11/20/2023    Bradycardia 06/12/2022    Chronic diastolic congestive heart failure ([facility]) 02/20/2020    Chronic ulcer of left leg with fat layer exposed ([facility]) 06/21/2022    CKD stage 4 secondary to hypertension ([facility]) 02/20/2020    Dependence on renal dialysis ([facility])     John Norris, Sat    ESRD (end stage renal disease) ([facility])     Essential hypertension 07/25/2020    Gastrointestinal hemorrhage 07/20/2022    Gout     Hallucinations 02/18/2021    Hard of hearing     Head contusion 09/09/2020    Hyperkalemia 06/28/2022    Iron deficiency anemia, unspecified     Leg ulcer, left, with fat layer exposed ([facility]) 04/20/2022    Lymphedema 05/11/2022    Meningioma ([facility]) 02/25/2021    Meningioma, cerebral ([facility]) 07/26/2020    Mild intermittent asthma, uncomplicated 07/18/2022    Myocardial infarction ([facility])     Nephrotic range proteinuria     NSTEMI (non-ST elevated myocardial infarction) ([facility]) 07/22/2023    Obesity (BMI 30-39.9) 06/14/2022    Old myocardial infarction 07/18/2022    Paroxysmal atrial fibrillation ([facility]) 07/28/2020    Pressure injury of buttock, stage 2 ([facility]) 11/25/2022    Pressure injury of sacral region, stage 2 ([facility]) 11/11/2022    Pulmonary edema cardiac cause ([facility])     Pure hypercholesterolemia     Sepsis ([facility]) 07/12/2022    Severe malnutrition ([facility]) 07/22/2022    Shock ([facility])     Stage 4 chronic kidney disease ([facility])     Symptomatic anemia     Toxic metabolic

## 2024-01-12 NOTE — PROGRESS NOTES
NEUROLOGY INPATIENT PROGRESS NOTE    1/12/2024         Subjective: Lupe Ojeda is a  73 y.o. female admitted on 1/8/2024 with Altered mental status [R41.82]  Altered mental status, unspecified altered mental status type [R41.82]    Chart reviewed and discussed with caregivers.  Patient was examined.  Patient just returned from MRI brain and patient has received sedation for MRI brain.    Briefly, this is a  73 y.o. female with hx of A-fib, meningioma was admitted on 1/8/2024 with altered mentation.  Neurology consultation is requested for worsening confusion with nonsensical speech, bizarre behaviors and agitation since after discharged few days ago from the hospital.  Patient was evaluated by neurology on 12/31/2023 for confusion from UTI and pneumonic process.  Patient was noted to have hyperactive delirium with a gradual decline in cognitive status over the past 1 year.  She has had right temporal meningioma and was also on antiseizure medications for concern of seizure risk.  She also had abnormal photoparoxysmal response with photosensitivity in right temporal occipital region.  Initially patient was on Lamictal but it was switched to Vimpat.  Her history is significant for CAD s/p CABG, heart failure with low EF and prior CVA and COPD, ESRD on hemodialysis.  She also had history of atrial fibrillation but not on anticoagulation due to bleeding risk.    1/12/2024: Chart reviewed and discussed with caregivers. Patient's daughter at bedside. Caregivers stated that \"patient was wild; agitated earlier\" but presently calm with precedex sedation.    Patient had MRI brain yesterday demonstrating stable right middle cranial fossa meningioma with vasogenic edema and mild mass effect.  But no acute intracranial abnormalities on comparison to prior neuroimaging study.    No current facility-administered medications on file prior to encounter.     Current Outpatient Medications on File Prior to Encounter   Medication  2022 SEBASTIAN Mirza [facility] SPECIAL PROCEDURES    IR TUNNELED CATHETER PLACEMENT GREATER THAN 5 YEARS  2022    IR TUNNELED CATHETER PLACEMENT GREATER THAN 5 YEARS 2022 [facility] SPECIAL PROCEDURES    THORACENTESIS  03/10/2022         TONSILLECTOMY AND ADENOIDECTOMY      UPPER GASTROINTESTINAL ENDOSCOPY N/A 03/15/2022    EGD BIOPSY performed by Manjeet Wall MD at [facility]    UPPER GASTROINTESTINAL ENDOSCOPY N/A 2022    EGD ESOPHAGOGASTRODUODENOSCOPY performed by Linwood Watts MD at [facility]           Medications:     dexAMETHasone  2 mg IntraVENous Q8H    risperiDONE  1 mg Oral BID    lacosamide (VIMPAT) 100 mg in sodium chloride 0.9 % 60 mL IVPB  100 mg IntraVENous BID    amiodarone  100 mg Oral QAM    aspirin  81 mg Oral Daily    atorvastatin  40 mg Oral QPM    clopidogrel  75 mg Oral Daily    famotidine  10 mg Oral QAM AC    budesonide-formoterol  2 puff Inhalation BID RT    [Held by provider] furosemide  80 mg Oral Every Other Day    levothyroxine  25 mcg Oral QAM AC    lidocaine  1 patch Topical Daily    metoprolol succinate  25 mg Oral QPM    mirtazapine  15 mg Oral Nightly    mupirocin   Topical Daily    miconazole   Topical BID    polyethylene glycol  17 g Oral Daily    tiotropium  2 puff Inhalation QAM AC    sodium chloride flush  5-40 mL IntraVENous 2 times per day    heparin (porcine)  5,000 Units SubCUTAneous 3 times per day    cetirizine  5 mg Oral Daily     PRN Meds include: hydrALAZINE, albuterol, albuterol sulfate HFA, hydrOXYzine HCl, midodrine, sodium chloride flush, sodium chloride, ondansetron **OR** ondansetron, acetaminophen **OR** acetaminophen, anticoagulant sodium citrate, anticoagulant sodium citrate    Objective:   BP (!) 143/32   Pulse 72   Temp 97.6 °F (36.4 °C) (Axillary)   Resp 21   Ht 1.524 m (5')   Wt 58.3 kg (128 lb 8.5 oz)   SpO2 96%   BMI 25.10 kg/m²     Blood pressure range: Systolic (24hrs), Av , Min:110 , Max:174   ; Diastolic (24hrs), Av,

## 2024-01-12 NOTE — CARE COORDINATION
ONGOING DISCHARGE PLAN:    Patient resting, remains on Precedex GTT.    Asked daughter if she had a choice for visiting nurse service as of yet and she stated no, they are waiting to see how the patient improves. At this time still declines LTACH and SNF.    MRI completed, neurology following. Decadron 2 mg Q8H. Abnormal EEG, vimpat 100 BID.    Will continue to follow for additional discharge needs.    If patient is discharged prior to next notation, then this note serves as note for discharge by case management.    Electronically signed by Violeta Reynaga RN on 1/12/2024 at 1:34 PM

## 2024-01-12 NOTE — PROGRESS NOTES
..PALLIATIVE CARE TEAM    Patient: Lupe Ojeda  Room: 2013/2013-01    Reason For Consult   Goals of care evaluation  Distress management  Symptom Management  Guidance and support  Facilitate communications  Assistance in coordinating care  Recommendations for the above    Impression: Lupe Ojeda is a 73 y.o. year old female with  has a past medical history of Acute CVA (cerebrovascular accident) ([facility]), Acute kidney injury superimposed on CKD (HCC), Acute on chronic combined systolic (congestive) and diastolic (congestive) heart failure (HCC), Acute respiratory failure with hypoxia and hypercapnia (HCC), JOY (acute kidney injury) (HCC), Arthritis, Asthma, Atrial fibrillation, unspecified type (HCC), Bilateral lower extremity edema, Bilateral pleural effusion, Bradycardia, Chronic diastolic congestive heart failure (HCC), Chronic ulcer of left leg with fat layer exposed (HCC), CKD stage 4 secondary to hypertension (HCC), Dependence on renal dialysis (HCC), ESRD (end stage renal disease) ([facility]), Essential hypertension, Gastrointestinal hemorrhage, Gout, Hallucinations, Hard of hearing, Head contusion, Hyperkalemia, Iron deficiency anemia, unspecified, Leg ulcer, left, with fat layer exposed (HCC), Lymphedema, Meningioma (HCC), Meningioma, cerebral (HCC), Mild intermittent asthma, uncomplicated, Myocardial infarction (HCC), Nephrotic range proteinuria, NSTEMI (non-ST elevated myocardial infarction) ([facility]), Obesity (BMI 30-39.9), Old myocardial infarction, Paroxysmal atrial fibrillation (HCC), Pressure injury of buttock, stage 2 (HCC), Pressure injury of sacral region, stage 2 (HCC), Pulmonary edema cardiac cause (HCC), Pure hypercholesterolemia, Sepsis (HCC), Severe malnutrition (HCC), Shock (HCC), Stage 4 chronic kidney disease (HCC), Symptomatic anemia, Toxic metabolic encephalopathy, Type 2 diabetes mellitus with polyneuropathy (HCC), Type 2 diabetes mellitus with stage 4 chronic kidney disease, without

## 2024-01-12 NOTE — PROGRESS NOTES
Physical Therapy Cancel Note      DATE: 2024    NAME: Lupe Ojeda  MRN: [id number]   : 1950      Patient not seen this date for Physical Therapy due to:    Other: RN holds PT as pt is on precedex and not following commands. Will check back tomorrow. Time: 900      Electronically signed by Karyn Morton PT on 2024 at 10:26 AM

## 2024-01-13 LAB
ABSOLUTE BANDS: 0.13 K/UL (ref 0–1)
ALBUMIN SERPL-MCNC: 3 G/DL (ref 3.5–5.2)
ALP SERPL-CCNC: 93 U/L (ref 35–104)
ALT SERPL-CCNC: 9 U/L (ref 5–33)
ANION GAP SERPL CALCULATED.3IONS-SCNC: 16 MMOL/L (ref 9–17)
AST SERPL-CCNC: 13 U/L
BANDS: 2 % (ref 0–10)
BASOPHILS # BLD: 0 K/UL (ref 0–0.2)
BASOPHILS NFR BLD: 0 % (ref 0–2)
BILIRUB SERPL-MCNC: 0.4 MG/DL (ref 0.3–1.2)
BUN SERPL-MCNC: 36 MG/DL (ref 8–23)
CALCIUM SERPL-MCNC: 8.2 MG/DL (ref 8.6–10.4)
CHLORIDE SERPL-SCNC: 105 MMOL/L (ref 98–107)
CO2 SERPL-SCNC: 19 MMOL/L (ref 20–31)
CREAT SERPL-MCNC: 3.4 MG/DL (ref 0.5–0.9)
EOSINOPHIL # BLD: 0 K/UL (ref 0–0.4)
EOSINOPHILS RELATIVE PERCENT: 0 % (ref 0–4)
ERYTHROCYTE [DISTWIDTH] IN BLOOD BY AUTOMATED COUNT: 18.3 % (ref 11.5–14.9)
GFR SERPL CREATININE-BSD FRML MDRD: 14 ML/MIN/1.73M2
GLUCOSE SERPL-MCNC: 136 MG/DL (ref 70–99)
HCT VFR BLD AUTO: 39.3 % (ref 36–46)
HGB BLD-MCNC: 12.5 G/DL (ref 12–16)
LYMPHOCYTES NFR BLD: 0.26 K/UL (ref 1–4.8)
LYMPHOCYTES RELATIVE PERCENT: 4 % (ref 24–44)
MCH RBC QN AUTO: 29.6 PG (ref 26–34)
MCHC RBC AUTO-ENTMCNC: 31.7 G/DL (ref 31–37)
MCV RBC AUTO: 93.3 FL (ref 80–100)
MONOCYTES NFR BLD: 0.26 K/UL (ref 0.1–1.3)
MONOCYTES NFR BLD: 4 % (ref 1–7)
MORPHOLOGY: ABNORMAL
NEUTROPHILS NFR BLD: 90 % (ref 36–66)
NEUTS SEG NFR BLD: 5.85 K/UL (ref 1.3–9.1)
PLATELET # BLD AUTO: 123 K/UL (ref 150–450)
PMV BLD AUTO: 8.9 FL (ref 6–12)
POTASSIUM SERPL-SCNC: 4.5 MMOL/L (ref 3.7–5.3)
PROT SERPL-MCNC: 4.9 G/DL (ref 6.4–8.3)
RBC # BLD AUTO: 4.21 M/UL (ref 4–5.2)
SODIUM SERPL-SCNC: 140 MMOL/L (ref 135–144)
WBC OTHER # BLD: 6.5 K/UL (ref 3.5–11)

## 2024-01-13 PROCEDURE — 2500000003 HC RX 250 WO HCPCS: Performed by: INTERNAL MEDICINE

## 2024-01-13 PROCEDURE — 99233 SBSQ HOSP IP/OBS HIGH 50: CPT | Performed by: INTERNAL MEDICINE

## 2024-01-13 PROCEDURE — 36415 COLL VENOUS BLD VENIPUNCTURE: CPT

## 2024-01-13 PROCEDURE — 2060000000 HC ICU INTERMEDIATE R&B

## 2024-01-13 PROCEDURE — 85025 COMPLETE CBC W/AUTO DIFF WBC: CPT

## 2024-01-13 PROCEDURE — 6360000002 HC RX W HCPCS: Performed by: NURSE PRACTITIONER

## 2024-01-13 PROCEDURE — 6360000002 HC RX W HCPCS

## 2024-01-13 PROCEDURE — 6360000002 HC RX W HCPCS: Performed by: INTERNAL MEDICINE

## 2024-01-13 PROCEDURE — 80053 COMPREHEN METABOLIC PANEL: CPT

## 2024-01-13 PROCEDURE — 6370000000 HC RX 637 (ALT 250 FOR IP)

## 2024-01-13 PROCEDURE — 2580000003 HC RX 258: Performed by: NURSE PRACTITIONER

## 2024-01-13 PROCEDURE — 94640 AIRWAY INHALATION TREATMENT: CPT

## 2024-01-13 PROCEDURE — 90935 HEMODIALYSIS ONE EVALUATION: CPT

## 2024-01-13 PROCEDURE — 6370000000 HC RX 637 (ALT 250 FOR IP): Performed by: INTERNAL MEDICINE

## 2024-01-13 PROCEDURE — C9254 INJECTION, LACOSAMIDE: HCPCS | Performed by: NURSE PRACTITIONER

## 2024-01-13 PROCEDURE — 2580000003 HC RX 258

## 2024-01-13 PROCEDURE — 6370000000 HC RX 637 (ALT 250 FOR IP): Performed by: PSYCHIATRY & NEUROLOGY

## 2024-01-13 PROCEDURE — 99233 SBSQ HOSP IP/OBS HIGH 50: CPT | Performed by: PSYCHIATRY & NEUROLOGY

## 2024-01-13 RX ORDER — METOPROLOL SUCCINATE 25 MG/1
25 TABLET, EXTENDED RELEASE ORAL 2 TIMES DAILY
Status: DISCONTINUED | OUTPATIENT
Start: 2024-01-13 | End: 2024-01-14

## 2024-01-13 RX ORDER — DILTIAZEM HYDROCHLORIDE 60 MG/1
60 TABLET, FILM COATED ORAL EVERY 6 HOURS SCHEDULED
Status: DISCONTINUED | OUTPATIENT
Start: 2024-01-13 | End: 2024-01-16 | Stop reason: HOSPADM

## 2024-01-13 RX ADMIN — DILTIAZEM HYDROCHLORIDE 60 MG: 60 TABLET, FILM COATED ORAL at 13:41

## 2024-01-13 RX ADMIN — MUPIROCIN: 20 OINTMENT TOPICAL at 08:24

## 2024-01-13 RX ADMIN — METOPROLOL SUCCINATE 25 MG: 25 TABLET, EXTENDED RELEASE ORAL at 20:04

## 2024-01-13 RX ADMIN — HEPARIN SODIUM 5000 UNITS: 5000 INJECTION INTRAVENOUS; SUBCUTANEOUS at 13:42

## 2024-01-13 RX ADMIN — HEPARIN SODIUM 5000 UNITS: 5000 INJECTION INTRAVENOUS; SUBCUTANEOUS at 05:20

## 2024-01-13 RX ADMIN — LACOSAMIDE 100 MG: 10 INJECTION INTRAVENOUS at 21:24

## 2024-01-13 RX ADMIN — ASPIRIN 81 MG: 81 TABLET, COATED ORAL at 08:17

## 2024-01-13 RX ADMIN — SODIUM CHLORIDE, PRESERVATIVE FREE 10 ML: 5 INJECTION INTRAVENOUS at 08:14

## 2024-01-13 RX ADMIN — ANTI-FUNGAL POWDER MICONAZOLE NITRATE TALC FREE: 1.42 POWDER TOPICAL at 08:14

## 2024-01-13 RX ADMIN — BUDESONIDE AND FORMOTEROL FUMARATE DIHYDRATE 2 PUFF: 160; 4.5 AEROSOL RESPIRATORY (INHALATION) at 20:37

## 2024-01-13 RX ADMIN — DILTIAZEM HYDROCHLORIDE 60 MG: 60 TABLET, FILM COATED ORAL at 17:29

## 2024-01-13 RX ADMIN — ANTI-FUNGAL POWDER MICONAZOLE NITRATE TALC FREE: 1.42 POWDER TOPICAL at 20:11

## 2024-01-13 RX ADMIN — DEXMEDETOMIDINE HYDROCHLORIDE 0.4 MCG/KG/HR: 400 INJECTION INTRAVENOUS at 17:30

## 2024-01-13 RX ADMIN — DEXMEDETOMIDINE HYDROCHLORIDE 0.7 MCG/KG/HR: 400 INJECTION INTRAVENOUS at 05:20

## 2024-01-13 RX ADMIN — DEXAMETHASONE SODIUM PHOSPHATE 2 MG: 4 INJECTION INTRA-ARTICULAR; INTRALESIONAL; INTRAMUSCULAR; INTRAVENOUS; SOFT TISSUE at 05:21

## 2024-01-13 RX ADMIN — DEXAMETHASONE SODIUM PHOSPHATE 2 MG: 4 INJECTION INTRA-ARTICULAR; INTRALESIONAL; INTRAMUSCULAR; INTRAVENOUS; SOFT TISSUE at 22:17

## 2024-01-13 RX ADMIN — HEPARIN SODIUM 5000 UNITS: 5000 INJECTION INTRAVENOUS; SUBCUTANEOUS at 22:17

## 2024-01-13 RX ADMIN — ATORVASTATIN CALCIUM 40 MG: 40 TABLET, FILM COATED ORAL at 17:29

## 2024-01-13 RX ADMIN — RISPERIDONE 1 MG: 1 TABLET, ORALLY DISINTEGRATING ORAL at 20:11

## 2024-01-13 RX ADMIN — DILTIAZEM HYDROCHLORIDE 60 MG: 60 TABLET, FILM COATED ORAL at 23:32

## 2024-01-13 RX ADMIN — DEXAMETHASONE SODIUM PHOSPHATE 2 MG: 4 INJECTION INTRA-ARTICULAR; INTRALESIONAL; INTRAMUSCULAR; INTRAVENOUS; SOFT TISSUE at 13:42

## 2024-01-13 RX ADMIN — MIRTAZAPINE 15 MG: 15 TABLET, FILM COATED ORAL at 20:04

## 2024-01-13 RX ADMIN — AMIODARONE HYDROCHLORIDE 100 MG: 200 TABLET ORAL at 08:17

## 2024-01-13 RX ADMIN — CLOPIDOGREL BISULFATE 75 MG: 75 TABLET ORAL at 08:17

## 2024-01-13 RX ADMIN — LACOSAMIDE 100 MG: 10 INJECTION INTRAVENOUS at 08:16

## 2024-01-13 RX ADMIN — RISPERIDONE 1 MG: 1 TABLET, ORALLY DISINTEGRATING ORAL at 08:18

## 2024-01-13 RX ADMIN — METOPROLOL SUCCINATE 25 MG: 25 TABLET, EXTENDED RELEASE ORAL at 13:41

## 2024-01-13 ASSESSMENT — PAIN SCALES - GENERAL
PAINLEVEL_OUTOF10: 0
PAINLEVEL_OUTOF10: 0

## 2024-01-13 NOTE — PROGRESS NOTES
RN offered drinks to pt, pt refused at this time. RN asked pt if she would like a drink for her pills, pt still refuses at this time.  RN explained reasoning for pills, pt still refuses. RN will continue to ask pt throughout the night if she would like her pills

## 2024-01-13 NOTE — PLAN OF CARE
Problem: Discharge Planning  Goal: Discharge to home or other facility with appropriate resources  Outcome: Progressing  Flowsheets (Taken 1/13/2024 0815)  Discharge to home or other facility with appropriate resources: Identify barriers to discharge with patient and caregiver     Problem: Skin/Tissue Integrity  Goal: Absence of new skin breakdown  Description: 1.  Monitor for areas of redness and/or skin breakdown  2.  Assess vascular access sites hourly  3.  Every 4-6 hours minimum:  Change oxygen saturation probe site  4.  Every 4-6 hours:  If on nasal continuous positive airway pressure, respiratory therapy assess nares and determine need for appliance change or resting period.  Outcome: Progressing     Problem: Safety - Adult  Goal: Free from fall injury  Outcome: Progressing     Problem: Confusion  Goal: Confusion, delirium, dementia, or psychosis is improved or at baseline  Description: INTERVENTIONS:  1. Assess for possible contributors to thought disturbance, including medications, impaired vision or hearing, underlying metabolic abnormalities, dehydration, psychiatric diagnoses, and notify attending LIP  2. [place] high risk fall precautions, as indicated  3. Provide frequent short contacts to provide reality reorientation, refocusing and direction  4. Decrease environmental stimuli, including noise as appropriate  5. Monitor and intervene to maintain adequate nutrition, hydration, elimination, sleep and activity  6. If unable to ensure safety without constant attention obtain sitter and review sitter guidelines with assigned personnel  7. Initiate Psychosocial CNS and Spiritual Care consult, as indicated  Outcome: Progressing  Flowsheets (Taken 1/13/2024 0815)  Effect of thought disturbance (confusion, delirium, dementia, or psychosis) are managed with adequate functional status: Assess for contributors to thought disturbance, including medications, impaired vision or hearing, underlying metabolic

## 2024-01-13 NOTE — PROGRESS NOTES
NEUROLOGY INPATIENT PROGRESS NOTE    1/13/2024         Subjective: Lupe Ojeda is a  73 y.o. female admitted on 1/8/2024 with Altered mental status [R41.82]  Altered mental status, unspecified altered mental status type [R41.82]    Chart reviewed and discussed with caregivers.  Patient was examined.  Patient just returned from MRI brain and patient has received sedation for MRI brain.    Briefly, this is a  73 y.o. female with hx of A-fib, meningioma was admitted on 1/8/2024 with altered mentation.  Neurology consultation is requested for worsening confusion with nonsensical speech, bizarre behaviors and agitation since after discharged few days ago from the hospital.  Patient was evaluated by neurology on 12/31/2023 for confusion from UTI and pneumonic process.  Patient was noted to have hyperactive delirium with a gradual decline in cognitive status over the past 1 year.  She has had right temporal meningioma and was also on antiseizure medications for concern of seizure risk.  She also had abnormal photoparoxysmal response with photosensitivity in right temporal occipital region.  Initially patient was on Lamictal but it was switched to Vimpat.  Her history is significant for CAD s/p CABG, heart failure with low EF and prior CVA and COPD, ESRD on hemodialysis.  She also had history of atrial fibrillation but not on anticoagulation due to bleeding risk.    1/12/2024: Chart reviewed and discussed with caregivers. Patient's daughter at bedside. Caregivers stated that \"patient was wild; agitated earlier\" but presently calm with precedex sedation.    Patient had MRI brain yesterday demonstrating stable right middle cranial fossa meningioma with vasogenic edema and mild mass effect.  But no acute intracranial abnormalities on comparison to prior neuroimaging study.    1/13/2024: chart reviewed and discussed with caregivers.  Patient is much more alert and awake.  Presently getting hemodialysis.      No current  LDLCHOLESTEROL 41 09/19/2022    HDL 46 09/19/2022    TRIG 144 09/19/2022    ALT 9 01/13/2024    AST 13 01/13/2024    TSH 2.73 12/31/2023    INR 1.0 12/31/2023    LABA1C 5.5 10/16/2023    [id number] 01/03/2024    MG 2.0 01/05/2024    PHOS 3.1 07/13/2022     EEG 1/3/2024: ABNORMAL EEG.   frequent, medium amplitude, spike and wave discharges of 1 Hz frequencies seen on the right temporal occipital region (T6/O2) greater than the left parietal occipital region  (P3/01) present during the photic stimulation.Clinically the patient was speaking during the initiation of 1 of these episodes while sitting on the bed and stopped speaking during the flashing of the lights.These discharges were time locked with photic stimulation. Differentials include photo paroxysmal response due to photosensitivity.  These occipital spike and wave discharges were not appreciated during other EPOCHs in this EEG recording.  Other differentials include epileptic spike and wave discharges with an epileptogenic in the right and left temporal occipital region; focal epilepsy cannot be excluded.  Clinical correlation recommended.    There was also continuous right hemisphere polymorphic delta and theta slowing, more prominent in the temporal region, suggestive of underlying focal cerebral dysfunction.   3.  Polymorphic delta and theta slowing is suggestive of mild to moderate encephalopathy.      MRI Brain (1/12/2024): 1. Stable right-sided anterior middle cranial fossa meningioma. Stable  vasogenic edema and mild mass effect in the anterior right temporal lobe.    2. No acute intracranial abnormality.     EEG 1/12/2024: Moderate diffuse slowing with intermittent generalized slow sharp discharges.          Impression and Plan: Ms. Lupe Ojeda is a 73 y.o. female with   Acute encephalopathy of unclear etiology; improving mentation; with abnormal MRI brain but no evidence of stroke. It showed right anterior cranial fossa meningioma with

## 2024-01-13 NOTE — PROGRESS NOTES
[facility]   IN-PATIENT SERVICE   [facility]    HISTORY AND PHYSICAL EXAMINATION            Date:   1/13/2024  Patient name:  Lupe Ojeda  Date of admission:  1/8/2024  8:24 PM  MRN:   [id number]  Account:  [id number]  YOB: 1950  PCP:    Kayleigh Cardoso MD  Room:   2013/2013-01  Code Status:    Full Code    Chief Complaint:     Chief Complaint   Patient presents with    Altered Mental Status     Worsening today        History Obtained From:     patient, electronic medical record    History of Present Illness:     The patient is a 73 y.o.  Non- / non  female who presents with Altered Mental Status (Worsening today )   and she is admitted to the hospital for the management of altered mental status  Patient has past medical history of multiple medical problem which include coronary artery disease s/p CABG, heart failure with reduced ejection fraction, history of stroke, history of hematuria, history of A-fib not on anticoagulation due to bleeding risk, COPD, ESRD on hemodialysis, bilateral cataract, hearing disability  Patient was recently discharged from the hospital only few days ago when she was admitted with altered mental status, underwent CT head suggestive of meningioma with mass effect, patient also underwent EEG suggestive of sharp waves concerning for possible seizures, started on antiepileptics  Patient family did not want to get her discharged to SNF  Patient eventually discharged home  Patient presented again with increasing confusion, as per daughter at bedside, patient was talking gibberish,  She was started on Seroquel which was discontinued outpatient due to side effect  Denying complaints of chest pain, shortness of breath  Patient is compliant with her diet medication  Last admission was planned to get MRI done which unfortunately could not be done because of patient agitation    Worsening confusion transferred to ICU for Precedex  encephalopathy 07/26/2020    Type 2 diabetes mellitus with polyneuropathy (HCC) 02/22/2023    Type 2 diabetes mellitus with stage 4 chronic kidney disease, without long-term current use of insulin (HCC)     Ulcer of right leg, with fat layer exposed (HCC) 05/20/2022    Unspecified venous (peripheral) insufficiency     Unspecified vitamin D deficiency         Past Surgical History:     Past Surgical History:   Procedure Laterality Date    AV FISTULA CREATION Right     CARDIAC CATHETERIZATION  2020    CARDIAC CATHETERIZATION  11/27/2023    CARDIAC PROCEDURE N/A 11/27/2023    Left heart cath / coronary angiography w grafts performed by Ken Mina MD at [facility] CARDIAC CATH LAB    CARDIAC PROCEDURE N/A 11/27/2023    Percutaneous coronary intervention performed by Ken Mina MD at [facility] CARDIAC CATH LAB    CARDIAC PROCEDURE N/A 11/27/2023    Intravascular ultrasound performed by Ken Mina MD at [facility] CARDIAC CATH LAB    CARDIOVERSION      COLONOSCOPY N/A 03/15/2022    COLONOSCOPY DIAGNOSTIC performed by Manjeet Wall MD at [facility]    CORONARY ARTERY BYPASS GRAFT      x 3, Dr. chavis    INTUBATION  03/07/2022         IR NONTUNNELED VASCULAR CATHETER  06/28/2022    IR NONTUNNELED VASCULAR CATHETER 6/28/2022 SEBASTIAN Mirza [facility] SPECIAL PROCEDURES    IR TUNNELED CATHETER PLACEMENT GREATER THAN 5 YEARS  07/08/2022    IR TUNNELED CATHETER PLACEMENT GREATER THAN 5 YEARS 7/8/2022 [facility] SPECIAL PROCEDURES    THORACENTESIS  03/10/2022         TONSILLECTOMY AND ADENOIDECTOMY      UPPER GASTROINTESTINAL ENDOSCOPY N/A 03/15/2022    EGD BIOPSY performed by Manjeet Wall MD at [facility]    UPPER GASTROINTESTINAL ENDOSCOPY N/A 07/21/2022    EGD ESOPHAGOGASTRODUODENOSCOPY performed by Linwood Watts MD at [facility]        Medications Prior to Admission:     Prior to Admission medications    Medication Sig Start Date End Date Taking? Authorizing Provider   lacosamide (VIMPAT) 100 MG TABS tablet Take 1  01/13/24  4:46 AM   Result Value Ref Range    WBC 6.5 3.5 - 11.0 k/uL    RBC 4.21 4.0 - 5.2 m/uL    Hemoglobin 12.5 12.0 - 16.0 g/dL    Hematocrit 39.3 36 - 46 %    MCV 93.3 80 - 100 fL    MCH 29.6 26 - 34 pg    MCHC 31.7 31 - 37 g/dL    RDW 18.3 (H) 11.5 - 14.9 %    Platelets 123 (L) 150 - 450 k/uL    MPV 8.9 6.0 - 12.0 fL    Neutrophils % 90 (H) 36 - 66 %    Lymphocytes % 4 (L) 24 - 44 %    Monocytes % 4 1 - 7 %    Eosinophils % 0 0 - 4 %    Basophils % 0 0 - 2 %    Bands 2 0 - 10 %    Neutrophils Absolute 5.85 1.3 - 9.1 k/uL    Lymphocytes Absolute 0.26 (L) 1.0 - 4.8 k/uL    Monocytes Absolute 0.26 0.1 - 1.3 k/uL    Eosinophils Absolute 0.00 0.0 - 0.4 k/uL    Basophils Absolute 0.00 0.0 - 0.2 k/uL    Absolute Bands # 0.13 0.0 - 1.0 k/uL    Morphology ANISOCYTOSIS PRESENT     Morphology 2+ ECHINOCYTES     Morphology 1+ ELLIPTOCYTES    Comprehensive Metabolic Panel w/ Reflex to MG    Collection Time: 01/13/24  4:46 AM   Result Value Ref Range    Sodium 140 135 - 144 mmol/L    Potassium 4.5 3.7 - 5.3 mmol/L    Chloride 105 98 - 107 mmol/L    CO2 19 (L) 20 - 31 mmol/L    Anion Gap 16 9 - 17 mmol/L    Glucose 136 (H) 70 - 99 mg/dL    BUN 36 (H) 8 - 23 mg/dL    Creatinine 3.4 (H) 0.5 - 0.9 mg/dL    Est, Glom Filt Rate 14 (L) >60 mL/min/1.73m2    Calcium 8.2 (L) 8.6 - 10.4 mg/dL    Total Protein 4.9 (L) 6.4 - 8.3 g/dL    Albumin 3.0 (L) 3.5 - 5.2 g/dL    Total Bilirubin 0.4 0.3 - 1.2 mg/dL    Alkaline Phosphatase 93 35 - 104 U/L    ALT 9 5 - 33 U/L    AST 13 <32 U/L       Imaging/Diagnostics:        Assessment :      Primary Problem  Altered mental status    Active Hospital Problems    Diagnosis Date Noted    Permanent atrial fibrillation (HCC) [I48.21] 01/09/2024     Priority: High    Ischemic cardiomyopathy [I25.5] 01/09/2024     Priority: High    Acute encephalopathy [G93.40] 01/11/2024    History of end stage renal disease [Z87.448] 01/10/2024    Encephalopathy acute [G93.40] 08/17/2023    Altered mental status

## 2024-01-13 NOTE — PROGRESS NOTES
NEPHROLOGY PROGRESS NOTE    Patient :  Lupe Ojeda; 73 y.o. MRN# [id number]  Location:  2013/2013-01  Attending:  Ludwig Daniel MD  Admit Date:  1/8/2024   Hospital Day: 5      Reason for Consult:        Chief Complaint:  AMS  History Obtained From:  patient, electronic medical record.    Subjective/interval hx:    Patient seen and examined today during dialysis, patient is remains confused but more calmer today  BP slightly elevated, A-fib RVR    History of Present Illness:    This is a 73 y.o. female with a significant past medical history of Cerebrovascular accident, atrial fibrillation status post CABG and recent PCI to left circumflex November 2023,  combined diastolic and systolic heart failure, essential hypertension, partial deafness and  ESRD on hemodialysis Tuesday Thursday Saturday at [facility] dialysis unit under my care.Patient had a recent Hospitalization for UTI/PNA and was discharged on 1/6/24.  Patient came back again to the hospital on 1/8/24 with c/o altered mental status, restlessness, confusion.  UA moderate Hb, small L.E  CT head No acute abnormality.      Past Medical History:        Diagnosis Date    Acute CVA (cerebrovascular accident) ([facility]) 07/25/2020    Acute kidney injury superimposed on CKD ([facility]) 06/08/2022    Acute on chronic combined systolic (congestive) and diastolic (congestive) heart failure ([facility]) 02/06/2022    Acute respiratory failure with hypoxia and hypercapnia ([facility])     JOY (acute kidney injury) ([facility]) 01/15/2022    Arthritis     Asthma     Atrial fibrillation, unspecified type ([facility])     Bilateral lower extremity edema 04/20/2022    Bilateral pleural effusion 11/20/2023    Bradycardia 06/12/2022    Chronic diastolic congestive heart failure ([facility]) 02/20/2020    Chronic ulcer of left leg with fat layer exposed ([facility]) 06/21/2022    CKD stage 4 secondary to hypertension ([facility]) 02/20/2020    Dependence on renal dialysis ([facility])     Tues, Th, Sat    ESRD (end stage renal disease) ([facility])

## 2024-01-13 NOTE — FLOWSHEET NOTE
01/13/24 1330   Vitals   Pulse (!) 142   Respirations 23   /65   MAP (Calculated) 88   MAP (mmHg) 80   Oxygen Therapy   SpO2 97 %     Rn spoke with Dr. Silva about pt HR currently afib in the 130-160s sustaining. Rn discussed morning amioderone that was given but the Toprol xl at 1800 was not given for the past couple of days due to the patient not swallowing pills. Provider ordered Cardizem PO and toporol XL to be given BID 1st dose now.

## 2024-01-13 NOTE — PROGRESS NOTES
Pulmonary Progress Note  [facility]      Patient - Lupe Ojeda,  Age - 73 y.o.    - 1950      Room Number -    MRN -  [id number]   [facility] # - [id number]  Date of Admission -  2024  8:24 PM    Consulting Service/Physician   Consulting - Ludwig Daniel MD  Primary Care Physician - Kayleigh Cardoso MD     SUBJECTIVE   Patient is currently repeating a sentence.    I was rounding with Corrine MAGALLON.  I am not quite sure what she is trying to say.  It sounds like \" I am Dr. Director.\"    It is repetitive  She does not appear in distress otherwise.  Her Precedex is at 0.3      OBJECTIVE   VITALS    height is 1.524 m (5') and weight is 58.7 kg (129 lb 6.6 oz). Her oral temperature is 97.6 °F (36.4 °C). Her blood pressure is 141/58 (abnormal) and her pulse is 81. Her respiration is 30 and oxygen saturation is 96%.     Body mass index is 25.27 kg/m².  Temperature Range: Temp: 97.6 °F (36.4 °C) Temp  Av.5 °F (36.4 °C)  Min: 97.3 °F (36.3 °C)  Max: 97.6 °F (36.4 °C)  BP Range:  Systolic (24hrs), Av , Min:120 , Max:153     Diastolic (24hrs), Av, Min:33, Max:87    Pulse Range: Pulse  Av  Min: 67  Max: 101  Respiration Range: Resp  Av.9  Min: 15  Max: 33  Current Pulse Ox::  SpO2: 96 %  24HR Pulse Ox Range:  SpO2  Av.3 %  Min: 95 %  Max: 96 %  Oxygen Amount and Delivery:      Wt Readings from Last 3 Encounters:   24 58.7 kg (129 lb 6.6 oz)   24 66 kg (145 lb 8.1 oz)   23 56.7 kg (125 lb)       I/O (24 Hours)    Intake/Output Summary (Last 24 hours) at 2024 1014  Last data filed at 2024 0900  Gross per 24 hour   Intake 976.8 ml   Output --   Net 976.8 ml       EXAM     General Appearance  Awake, alert, oriented, in no acute distress  HEENT - normocephalic, atraumatic.  Neck - Supple,  trachea midline   Lungs -coarse breath sounds no crackles rales or  Heart Exam:PMI normal. No lifts, heaves, or thrills. RRR. No murmurs,

## 2024-01-13 NOTE — PROGRESS NOTES
HEMODIALYSIS POST TREATMENT NOTE    Treatment time ordered: 3.0hrs    Actual treatment time: 3.0hrs    UltraFiltration Goal: 2.0kgs  UltraFiltration Removed: 2.0kgs      Pre Treatment weight: 63.0kgs  Post Treatment weight: 61.0kgs  Estimated Dry Weight: Unknown    Access used:     Central Venous Catheter:          Tunneled or Non-tunneled: Tunneled           Site: Right Chest          Access Flow: Good      Internal Access:       AV Fistula or AV Graft: AVF         Site: Right Upper Arm       Access Flow: Not used maturing       Sign and symptoms of infection: No       If YES: N/A    Medications or blood products given: See MAR    Chronic outpatient schedule: TTS    Chronic outpatient unit: [facility]    Summary of response to treatment: Patient tolerated treatment good.  Patient was tachy throughout treatment, floor RN did administer medication and patient was asymptomatic.  2.0Kgs of fluid removed without difficulty.  CVC clamped and capped.     Explain if orders NOT met, was physician notified:N/A      ACES flowsheet faxed to patient unit/ placed in patient chart: Yes    Post assessment completed: Poonam Burton    Report given to: Corrine      * Intra-treatment documented Safety Checks include the followin) Access and face visible at all times.     2) All connections and blood lines are secure with no kinks.     3) NVL alarm engaged.     4) Hemosafe device applied (if applicable).     5) No collapse of Arterial or Venous blood chambers.     6) All blood lines / pump segments in the air detectors.

## 2024-01-13 NOTE — PROGRESS NOTES
HEMODIALYSIS PRE-TREATMENT NOTE    Patient Identifiers prior to treatment: Name  MRN    Isolation Required: Yes                      Isolation Type: Contact       (please document if patient is being managed as a PUI/COVID-19 patient)        Hepatitis status:                           Date Drawn                             Result  Hepatitis B Surface Antigen 2024     NEG                     Hepatitis B Surface Antibody N/A N/A        Hepatitis B Core Antibody 2024 NEG          How was Hepatitis Status verified: Epic Chart     Was a copy of the labs you documented provided to facility for the patient's chart: yes    Hemodialysis orders verified: Yes by Poonam Burton    Access Within normal limits ( I.e. s/s of infection,...): WNL     Pre-Assessment completed: yes by Poonam Burton    Pre-dialysis report received from: Corrine                      Time: 1100

## 2024-01-14 PROCEDURE — 6370000000 HC RX 637 (ALT 250 FOR IP): Performed by: INTERNAL MEDICINE

## 2024-01-14 PROCEDURE — 2580000003 HC RX 258: Performed by: NURSE PRACTITIONER

## 2024-01-14 PROCEDURE — 99233 SBSQ HOSP IP/OBS HIGH 50: CPT | Performed by: INTERNAL MEDICINE

## 2024-01-14 PROCEDURE — C9254 INJECTION, LACOSAMIDE: HCPCS | Performed by: NURSE PRACTITIONER

## 2024-01-14 PROCEDURE — 6370000000 HC RX 637 (ALT 250 FOR IP)

## 2024-01-14 PROCEDURE — 6360000002 HC RX W HCPCS: Performed by: INTERNAL MEDICINE

## 2024-01-14 PROCEDURE — 6360000002 HC RX W HCPCS: Performed by: NURSE PRACTITIONER

## 2024-01-14 PROCEDURE — 99232 SBSQ HOSP IP/OBS MODERATE 35: CPT | Performed by: PSYCHIATRY & NEUROLOGY

## 2024-01-14 PROCEDURE — 6370000000 HC RX 637 (ALT 250 FOR IP): Performed by: PSYCHIATRY & NEUROLOGY

## 2024-01-14 PROCEDURE — 2060000000 HC ICU INTERMEDIATE R&B

## 2024-01-14 PROCEDURE — 6360000002 HC RX W HCPCS

## 2024-01-14 PROCEDURE — 2580000003 HC RX 258

## 2024-01-14 RX ORDER — METOPROLOL SUCCINATE 50 MG/1
50 TABLET, EXTENDED RELEASE ORAL 2 TIMES DAILY
Status: DISCONTINUED | OUTPATIENT
Start: 2024-01-14 | End: 2024-01-16 | Stop reason: HOSPADM

## 2024-01-14 RX ADMIN — DILTIAZEM HYDROCHLORIDE 60 MG: 60 TABLET, FILM COATED ORAL at 16:58

## 2024-01-14 RX ADMIN — HYDRALAZINE HYDROCHLORIDE 10 MG: 20 INJECTION INTRAMUSCULAR; INTRAVENOUS at 05:17

## 2024-01-14 RX ADMIN — ANTI-FUNGAL POWDER MICONAZOLE NITRATE TALC FREE: 1.42 POWDER TOPICAL at 08:18

## 2024-01-14 RX ADMIN — DILTIAZEM HYDROCHLORIDE 60 MG: 60 TABLET, FILM COATED ORAL at 05:21

## 2024-01-14 RX ADMIN — MIRTAZAPINE 15 MG: 15 TABLET, FILM COATED ORAL at 20:22

## 2024-01-14 RX ADMIN — RISPERIDONE 1 MG: 1 TABLET, ORALLY DISINTEGRATING ORAL at 20:22

## 2024-01-14 RX ADMIN — LACOSAMIDE 100 MG: 10 INJECTION INTRAVENOUS at 08:17

## 2024-01-14 RX ADMIN — SODIUM CHLORIDE, PRESERVATIVE FREE 10 ML: 5 INJECTION INTRAVENOUS at 08:17

## 2024-01-14 RX ADMIN — ATORVASTATIN CALCIUM 40 MG: 40 TABLET, FILM COATED ORAL at 16:58

## 2024-01-14 RX ADMIN — HYDRALAZINE HYDROCHLORIDE 10 MG: 20 INJECTION INTRAMUSCULAR; INTRAVENOUS at 14:09

## 2024-01-14 RX ADMIN — HYDROXYZINE HYDROCHLORIDE 50 MG: 50 TABLET, FILM COATED ORAL at 10:09

## 2024-01-14 RX ADMIN — ASPIRIN 81 MG: 81 TABLET, COATED ORAL at 08:16

## 2024-01-14 RX ADMIN — DILTIAZEM HYDROCHLORIDE 60 MG: 60 TABLET, FILM COATED ORAL at 12:01

## 2024-01-14 RX ADMIN — SODIUM CHLORIDE, PRESERVATIVE FREE 10 ML: 5 INJECTION INTRAVENOUS at 20:20

## 2024-01-14 RX ADMIN — DEXAMETHASONE SODIUM PHOSPHATE 2 MG: 4 INJECTION INTRA-ARTICULAR; INTRALESIONAL; INTRAMUSCULAR; INTRAVENOUS; SOFT TISSUE at 14:15

## 2024-01-14 RX ADMIN — HEPARIN SODIUM 5000 UNITS: 5000 INJECTION INTRAVENOUS; SUBCUTANEOUS at 14:15

## 2024-01-14 RX ADMIN — AMIODARONE HYDROCHLORIDE 100 MG: 200 TABLET ORAL at 08:16

## 2024-01-14 RX ADMIN — HYDRALAZINE HYDROCHLORIDE 10 MG: 20 INJECTION INTRAMUSCULAR; INTRAVENOUS at 20:18

## 2024-01-14 RX ADMIN — FAMOTIDINE 10 MG: 20 TABLET, FILM COATED ORAL at 05:23

## 2024-01-14 RX ADMIN — DEXAMETHASONE SODIUM PHOSPHATE 2 MG: 4 INJECTION INTRA-ARTICULAR; INTRALESIONAL; INTRAMUSCULAR; INTRAVENOUS; SOFT TISSUE at 21:26

## 2024-01-14 RX ADMIN — CLOPIDOGREL BISULFATE 75 MG: 75 TABLET ORAL at 08:16

## 2024-01-14 RX ADMIN — DEXAMETHASONE SODIUM PHOSPHATE 2 MG: 4 INJECTION INTRA-ARTICULAR; INTRALESIONAL; INTRAMUSCULAR; INTRAVENOUS; SOFT TISSUE at 05:16

## 2024-01-14 RX ADMIN — LEVOTHYROXINE SODIUM 25 MCG: 0.03 TABLET ORAL at 05:22

## 2024-01-14 RX ADMIN — METOPROLOL SUCCINATE 50 MG: 50 TABLET, EXTENDED RELEASE ORAL at 20:22

## 2024-01-14 RX ADMIN — HEPARIN SODIUM 5000 UNITS: 5000 INJECTION INTRAVENOUS; SUBCUTANEOUS at 21:26

## 2024-01-14 RX ADMIN — RISPERIDONE 1 MG: 1 TABLET, ORALLY DISINTEGRATING ORAL at 08:18

## 2024-01-14 RX ADMIN — METOPROLOL SUCCINATE 25 MG: 25 TABLET, EXTENDED RELEASE ORAL at 08:16

## 2024-01-14 RX ADMIN — HEPARIN SODIUM 5000 UNITS: 5000 INJECTION INTRAVENOUS; SUBCUTANEOUS at 05:16

## 2024-01-14 RX ADMIN — ANTI-FUNGAL POWDER MICONAZOLE NITRATE TALC FREE: 1.42 POWDER TOPICAL at 20:28

## 2024-01-14 RX ADMIN — CETIRIZINE HYDROCHLORIDE 5 MG: 10 TABLET, FILM COATED ORAL at 08:17

## 2024-01-14 RX ADMIN — LACOSAMIDE 100 MG: 10 INJECTION INTRAVENOUS at 21:22

## 2024-01-14 ASSESSMENT — PAIN SCALES - GENERAL: PAINLEVEL_OUTOF10: 0

## 2024-01-14 NOTE — PROGRESS NOTES
NEUROLOGY INPATIENT PROGRESS NOTE    1/14/2024         Subjective: Lupe Ojeda is a  73 y.o. female admitted on 1/8/2024 with Altered mental status [R41.82]  Altered mental status, unspecified altered mental status type [R41.82]    Chart reviewed and discussed with caregivers.  Patient was examined.  Patient just returned from MRI brain and patient has received sedation for MRI brain.    Briefly, this is a  73 y.o. female with hx of A-fib, meningioma was admitted on 1/8/2024 with altered mentation.  Neurology consultation is requested for worsening confusion with nonsensical speech, bizarre behaviors and agitation since after discharged few days ago from the hospital.  Patient was evaluated by neurology on 12/31/2023 for confusion from UTI and pneumonic process.  Patient was noted to have hyperactive delirium with a gradual decline in cognitive status over the past 1 year.  She has had right temporal meningioma and was also on antiseizure medications for concern of seizure risk.  She also had abnormal photoparoxysmal response with photosensitivity in right temporal occipital region.  Initially patient was on Lamictal but it was switched to Vimpat.  Her history is significant for CAD s/p CABG, heart failure with low EF and prior CVA and COPD, ESRD on hemodialysis.  She also had history of atrial fibrillation but not on anticoagulation due to bleeding risk.    1/12/2024: Chart reviewed and discussed with caregivers. Patient's daughter at bedside. Caregivers stated that \"patient was wild; agitated earlier\" but presently calm with precedex sedation.    Patient had MRI brain yesterday demonstrating stable right middle cranial fossa meningioma with vasogenic edema and mild mass effect.  But no acute intracranial abnormalities on comparison to prior neuroimaging study.    1/13/2024: chart reviewed and discussed with caregivers.  Patient is much more alert and awake.  Presently getting hemodialysis.    1/14/2024: Chart  Ken Mina MD at [facility] CARDIAC CATH LAB    CARDIAC PROCEDURE N/A 11/27/2023    Percutaneous coronary intervention performed by Ken Mina MD at [facility] CARDIAC CATH LAB    CARDIAC PROCEDURE N/A 11/27/2023    Intravascular ultrasound performed by Ken Mina MD at [facility] CARDIAC CATH LAB    CARDIOVERSION      COLONOSCOPY N/A 03/15/2022    COLONOSCOPY DIAGNOSTIC performed by Manjeet Wall MD at [facility]    CORONARY ARTERY BYPASS GRAFT      x 3, Dr. chavis    INTUBATION  03/07/2022         IR NONTUNNELED VASCULAR CATHETER  06/28/2022    IR NONTUNNELED VASCULAR CATHETER 6/28/2022 SEBASTIAN Mirza [facility] SPECIAL PROCEDURES    IR TUNNELED CATHETER PLACEMENT GREATER THAN 5 YEARS  07/08/2022    IR TUNNELED CATHETER PLACEMENT GREATER THAN 5 YEARS 7/8/2022 [facility] SPECIAL PROCEDURES    THORACENTESIS  03/10/2022         TONSILLECTOMY AND ADENOIDECTOMY      UPPER GASTROINTESTINAL ENDOSCOPY N/A 03/15/2022    EGD BIOPSY performed by Manjeet Wall MD at [facility]    UPPER GASTROINTESTINAL ENDOSCOPY N/A 07/21/2022    EGD ESOPHAGOGASTRODUODENOSCOPY performed by Linwood Watts MD at [facility]           Medications:     metoprolol succinate  25 mg Oral BID    dilTIAZem  60 mg Oral 4 times per day    dexAMETHasone  2 mg IntraVENous Q8H    risperiDONE  1 mg Oral BID    lacosamide (VIMPAT) 100 mg in sodium chloride 0.9 % 60 mL IVPB  100 mg IntraVENous BID    amiodarone  100 mg Oral QAM    aspirin  81 mg Oral Daily    atorvastatin  40 mg Oral QPM    clopidogrel  75 mg Oral Daily    famotidine  10 mg Oral QAM AC    budesonide-formoterol  2 puff Inhalation BID RT    [Held by provider] furosemide  80 mg Oral Every Other Day    levothyroxine  25 mcg Oral QAM AC    lidocaine  1 patch Topical Daily    mirtazapine  15 mg Oral Nightly    mupirocin   Topical Daily    miconazole   Topical BID    polyethylene glycol  17 g Oral Daily    tiotropium  2 puff Inhalation QAM AC    sodium chloride flush  5-40 mL IntraVENous 2 times  anterior right temporal lobe.    2. No acute intracranial abnormality.     EEG 1/12/2024: Moderate diffuse slowing with intermittent generalized slow sharp discharges.          Impression and Plan: Ms. Lupe Ojeda is a 73 y.o. female with   Acute encephalopathy of unclear etiology; improving mentation; with abnormal MRI brain with Rt anterior cranial fossa meningioma with vasogenic edema; stable on comparison to prior study.  Patient is on Decadron 2 mg every 8 hours.     Abnormal EEG on 1/3/2024 with seizure disorder: On Vimpat 100 twice daily; levels pending. Rpt eeg with moderate diffuse slowing; improvement in findings with no seizures    Delirium with psychosis with hx of depression; psych on board.     Rt temporal meningioma with vasogenic edema; on decadron 2mg iv q8hr.    Comorbid ESRD on hemodialysis  History of CAD s/p CABG  Other comorbid conditions include hypertension, hyperlipidemia, diabetes.    Care plan is discussed with the patient and her nurse and patient' daughter at bedside.  Will follow with you.      This note was partially created using voice recognition software and is inherently subject to errors including those of syntax and \"sound alike\" substitutions which may escape proofreading.  In such instances, original meaning may be extrapolated by contextual derivation.  Ev Sanchez MD 1/14/2024 12:10 PM

## 2024-01-14 NOTE — PROGRESS NOTES
01/14/24 1339   Encounter Summary   Encounter Overview/Reason  Spiritual/Emotional Needs   Service Provided For: Patient and family together   Referral/Consult From: Palliative Care   Support System Children   Last Encounter  01/14/24   Complexity of Encounter Moderate   Spiritual/Emotional needs   Type Spiritual Support   Assessment/Intervention/Outcome   Assessment Anxious   Intervention Active listening;Sustaining Presence/Ministry of presence   Outcome Receptive;Comfort;Engaged in conversation;Expressed feelings, needs, and concerns

## 2024-01-14 NOTE — PROGRESS NOTES
disease) ([facility])     Essential hypertension 07/25/2020    Gastrointestinal hemorrhage 07/20/2022    Gout     Hallucinations 02/18/2021    Hard of hearing     Head contusion 09/09/2020    Hyperkalemia 06/28/2022    Iron deficiency anemia, unspecified     Leg ulcer, left, with fat layer exposed ([facility]) 04/20/2022    Lymphedema 05/11/2022    Meningioma ([facility]) 02/25/2021    Meningioma, cerebral ([facility]) 07/26/2020    Mild intermittent asthma, uncomplicated 07/18/2022    Myocardial infarction ([facility])     Nephrotic range proteinuria     NSTEMI (non-ST elevated myocardial infarction) ([facility]) 07/22/2023    Obesity (BMI 30-39.9) 06/14/2022    Old myocardial infarction 07/18/2022    Paroxysmal atrial fibrillation ([facility]) 07/28/2020    Pressure injury of buttock, stage 2 ([facility]) 11/25/2022    Pressure injury of sacral region, stage 2 ([facility]) 11/11/2022    Pulmonary edema cardiac cause ([facility])     Pure hypercholesterolemia     Sepsis ([facility]) 07/12/2022    Severe malnutrition ([facility]) 07/22/2022    Shock ([facility])     Stage 4 chronic kidney disease ([facility])     Symptomatic anemia     Toxic metabolic encephalopathy 07/26/2020    Type 2 diabetes mellitus with polyneuropathy ([facility]) 02/22/2023    Type 2 diabetes mellitus with stage 4 chronic kidney disease, without long-term current use of insulin ([facility])     Ulcer of right leg, with fat layer exposed ([facility]) 05/20/2022    Unspecified venous (peripheral) insufficiency     Unspecified vitamin D deficiency        Past Surgical History:        Procedure Laterality Date    AV FISTULA CREATION Right     CARDIAC CATHETERIZATION  2020    CARDIAC CATHETERIZATION  11/27/2023    CARDIAC PROCEDURE N/A 11/27/2023    Left heart cath / coronary angiography w grafts performed by Ken Mina MD at [facility] CARDIAC CATH LAB    CARDIAC PROCEDURE N/A 11/27/2023    Percutaneous coronary intervention performed by Ken Mina MD at [facility] CARDIAC CATH LAB    CARDIAC PROCEDURE N/A 11/27/2023    Intravascular ultrasound performed    Social History Narrative    Not on file     Social Determinants of Health     Financial Resource Strain: Low Risk  (2023)    Overall Financial Resource Strain (CARDIA)     Difficulty of Paying Living Expenses: Not hard at all   Recent Concern: Financial Resource Strain - Medium Risk (2023)    Overall Financial Resource Strain (CARDIA)     Difficulty of Paying Living Expenses: Somewhat hard   Food Insecurity: No Food Insecurity (2024)    Hunger Vital Sign     Worried About Running Out of Food in the Last Year: Never true     Ran Out of Food in the Last Year: Never true   Recent Concern: Food Insecurity - Food Insecurity Present (2023)    Hunger Vital Sign     Worried About Running Out of Food in the Last Year: Sometimes true     Ran Out of Food in the Last Year: Sometimes true   Transportation Needs: No Transportation Needs (2024)    PRAPARE - Transportation     Lack of Transportation (Medical): No     Lack of Transportation (Non-Medical): No   Physical Activity: Inactive (2022)    Exercise Vital Sign     Days of Exercise per Week: 0 days     Minutes of Exercise per Session: 0 min   Stress: Not on file   Social Connections: Not on file   Intimate Partner Violence: Not on file   Housing Stability: Low Risk  (2024)    Housing Stability Vital Sign     Unable to Pay for Housing in the Last Year: No     Number of Places Lived in the Last Year: 1     Unstable Housing in the Last Year: No         Objective:  CURRENT TEMPERATURE:  Temp: 98.6 °F (37 °C)  MAXIMUM TEMPERATURE OVER 24HRS:  Temp (24hrs), Av °F (36.7 °C), Min:97.7 °F (36.5 °C), Max:98.6 °F (37 °C)    CURRENT RESPIRATORY RATE:  Respirations: 15  CURRENT PULSE:  Pulse: (!) 110  CURRENT BLOOD PRESSURE:  BP: (!) 154/65  24HR BLOOD PRESSURE RANGE:  Systolic (24hrs), Av , Min:117 , Max:194   ; Diastolic (24hrs), Av, Min:44, Max:81    24HR INTAKE/OUTPUT:    Intake/Output Summary (Last 24 hours) at 2024 1621  Last

## 2024-01-14 NOTE — CARE COORDINATION
ONGOING DISCHARGE PLAN:    Off of Precedex drip    Taking oral meds    Afib RVR    Increase Toprol     On decadron     Family still refusing SNF      Will continue to follow for additional discharge needs.    If patient is discharged prior to next notation, then this note serves as note for discharge by case management.    Electronically signed by Jaquelin Lee RN on 1/14/2024 at 3:53 PM

## 2024-01-14 NOTE — PROGRESS NOTES
Physical Therapy        Physical Therapy Cancel Note      DATE: 2024    NAME: Lupe Ojeda  MRN: [id number]   : 1950      Attempted to see patient from  with multiple methods to try to engage pt. She still is very confused and not oriented. Does not respond to many questions appropriately. Consistently calling out for \"Charles\" and whistling when attempting to move patient. Unable to follow any commands to get purposeful movement. Not appropriate for a PT eval at this time. Will continue to monitor and check on patient to see if any improvement of cognitive status will allow for PT eval       Electronically signed by Chester Hui PT on 2024 at 11:41 AM

## 2024-01-14 NOTE — PROGRESS NOTES
Pt is refusing lab draws at this time. RN attempted to explain to pt reason for lab draws but pt still refused.

## 2024-01-14 NOTE — PROGRESS NOTES
Pulmonary Progress Note  Pulmonary and Critical Care Specialists      Patient - Lupe Ojeda,  Age - 73 y.o.    - 1950      Room Number -    MRN -  [id number]   [facility] # - [id number]  Date of Admission -  2024  8:24 PM        Consulting Service/Physician   Consulting - Ludwig Daniel MD  Primary Care Physician - Kayleigh Cardoso MD     SUBJECTIVE   Patient is resting quietly.  She is sleeping.  I spoke to the patient's daughter Sea    I also spoke to neurology consult group.  Patient is less confused.  Not agitated.    Patient is off Precedex    OBJECTIVE   VITALS    height is 1.524 m (5') and weight is 61 kg (134 lb 7.7 oz). Her oral temperature is 98.4 °F (36.9 °C). Her blood pressure is 137/53 (abnormal) and her pulse is 120 (abnormal). Her respiration is 16 and oxygen saturation is 94%.     Body mass index is 26.26 kg/m².  Temperature Range: Temp: 98.4 °F (36.9 °C) Temp  Av.8 °F (36.6 °C)  Min: 97.5 °F (36.4 °C)  Max: 98.4 °F (36.9 °C)  BP Range:  Systolic (24hrs), Av , Min:117 , Max:194     Diastolic (24hrs), Av, Min:44, Max:81    Pulse Range: Pulse  Av.6  Min: 78  Max: 120  Respiration Range: Resp  Av.9  Min: 12  Max: 25  Current Pulse Ox::  SpO2: 94 %  24HR Pulse Ox Range:  SpO2  Av.4 %  Min: 94 %  Max: 97 %  Oxygen Amount and Delivery:      Wt Readings from Last 3 Encounters:   24 61 kg (134 lb 7.7 oz)   24 66 kg (145 lb 8.1 oz)   23 56.7 kg (125 lb)       I/O (24 Hours)    Intake/Output Summary (Last 24 hours) at 2024 1530  Last data filed at 2024 0906  Gross per 24 hour   Intake 1310.4 ml   Output --   Net 1310.4 ml       EXAM     General Appearance  Awake, alert, oriented, in no acute distress  HEENT - normocephalic, atraumatic.  Neck - Supple,  trachea midline   Lungs -clear no crackles rales or wheezes  Heart Exam:PMI normal. No lifts, heaves, or thrills. RRR.  Abdomen Exam: Abdomen soft,   Extremity Exam: No signs of  Value Date/Time    MODE [facility] 07/06/2022 04:40 AM     Ionized Calcium:  No results found for: \"IONCA\"  Magnesium:    Lab Results   Component Value Date/Time    MG 2.0 01/05/2024 05:21 AM     Phosphorus:    Lab Results   Component Value Date/Time    PHOS 3.1 07/13/2022 08:29 AM        LIVER PROFILE   Recent Labs     01/13/24  0446   AST 13   ALT 9   BILITOT 0.4   ALKPHOS 93     INR No results for input(s): \"INR\" in the last 72 hours.  PTT   Lab Results   Component Value Date    APTT 28.7 12/31/2023         RADIOLOGY     (See actual reports for details)    ASSESSMENT/PLAN     Patient Active Problem List   Diagnosis    Vitamin D deficiency    Venous insufficiency of both lower extremities    Type 2 diabetes mellitus with chronic kidney disease on chronic dialysis, without long-term current use of insulin (HCC)    Atherosclerosis of coronary artery bypass graft of native heart without angina pectoris    Hypertension, essential    Iron deficiency anemia    Colonoscopy refused    Bilateral hearing loss    COPD (chronic obstructive pulmonary disease) (HCC)    Gout with tophi    Dyslipidemia    Metabolic encephalopathy    E. coli UTI    Meningioma (HCC)    Kidney stones    Altered mental status    Delirium due to another medical condition    Diverticulosis    COVID-19 vaccination declined    Chronic venous stasis dermatitis of both lower extremities    Family history of colon cancer    Family history of pancreatic cancer    Mild malnutrition (HCC)    Decreased strength, endurance, and mobility    Lymphedema    PVD (peripheral vascular disease) (HCC)    Elevated troponin    ESRD on dialysis (HCC)    Hypertensive heart and chronic kidney disease with heart failure and with stage 5 chronic kidney disease, or end stage renal disease (HCC)    Gastroesophageal reflux disease    Moderate anxiety    Other age-related cataract    History of GI bleed    Chronic combined systolic and diastolic CHF (congestive heart failure) ([facility])

## 2024-01-14 NOTE — PROGRESS NOTES
[facility]   IN-PATIENT SERVICE   [facility]    HISTORY AND PHYSICAL EXAMINATION            Date:   1/14/2024  Patient name:  Lupe Ojeda  Date of admission:  1/8/2024  8:24 PM  MRN:   [id number]  Account:  [id number]  YOB: 1950  PCP:    Kayleigh Cardoso MD  Room:   2013/2013-01  Code Status:    Full Code    Chief Complaint:     Chief Complaint   Patient presents with    Altered Mental Status     Worsening today        History Obtained From:     patient, electronic medical record    History of Present Illness:     The patient is a 73 y.o.  Non- / non  female who presents with Altered Mental Status (Worsening today )   and she is admitted to the hospital for the management of altered mental status  Patient has past medical history of multiple medical problem which include coronary artery disease s/p CABG, heart failure with reduced ejection fraction, history of stroke, history of hematuria, history of A-fib not on anticoagulation due to bleeding risk, COPD, ESRD on hemodialysis, bilateral cataract, hearing disability  Patient was recently discharged from the hospital only few days ago when she was admitted with altered mental status, underwent CT head suggestive of meningioma with mass effect, patient also underwent EEG suggestive of sharp waves concerning for possible seizures, started on antiepileptics  Patient family did not want to get her discharged to SNF  Patient eventually discharged home  Patient presented again with increasing confusion, as per daughter at bedside, patient was talking gibberish,  She was started on Seroquel which was discontinued outpatient due to side effect  Denying complaints of chest pain, shortness of breath  Patient is compliant with her diet medication  Last admission was planned to get MRI done which unfortunately could not be done because of patient agitation    Worsening confusion transferred to ICU for Precedex  she does not currently use alcohol.  Drug Use:  reports no history of drug use.    Family History:     Family History   Problem Relation Age of Onset    Diabetes Mother     Heart Disease Mother     Heart Disease Father     Diabetes Sister     High Blood Pressure Sister     Diabetes Maternal Grandmother     Breast Cancer Maternal Cousin        Review of Systems:     Limited history because of patient confusion    Physical Exam:   BP (!) 146/64   Pulse (!) 108   Temp 97.8 °F (36.6 °C) (Oral)   Resp 19   Ht 1.524 m (5')   Wt 61 kg (134 lb 7.7 oz)   SpO2 95%   BMI 26.26 kg/m²   Temp (24hrs), Av.7 °F (36.5 °C), Min:97.5 °F (36.4 °C), Max:97.8 °F (36.6 °C)    No results for input(s): \"POCGLU\" in the last 72 hours.      Intake/Output Summary (Last 24 hours) at 2024 1221  Last data filed at 2024 0906  Gross per 24 hour   Intake 1810.4 ml   Output 2500 ml   Net -689.6 ml         General Appearance: Not oriented to time place or  Mental status: Not oriented to time place or person head:  normocephalic, atraumatic.  Eye: no icterus, redness, pupils equal and reactive, extraocular eye movements intact, conjunctiva clear  Ear: normal external ear, no discharge, hearing intact  Nose:  no drainage noted  Mouth: mucous membranes moist  Neck: supple, no carotid bruits, thyroid not palpable  Lungs: Bilateral equal air entry, clear to ausculation, no wheezing, rales or rhonchi, normal effort  Cardiovascular: normal rate, regular rhythm, no murmur, gallop, rub.  Abdomen: Soft, nontender, nondistended, normal bowel sounds, no hepatomegaly or splenomegaly  Neurologic: Moving all 4 extremity  Skin: No gross lesions, rashes, bruising or bleeding on exposed skin area  Extremities:  peripheral pulses palpable, no pedal edema or calf pain with palpation  Psych patient is confused mumbling    Investigations:      Laboratory Testing:  No results found for this or any previous visit (from the past 24

## 2024-01-15 PROBLEM — G93.89 MASS OF TEMPOROPARIETAL REGION OF BRAIN: Status: ACTIVE | Noted: 2024-01-15

## 2024-01-15 PROBLEM — G93.6 VASOGENIC CEREBRAL EDEMA (HCC): Status: ACTIVE | Noted: 2024-01-15

## 2024-01-15 LAB
ARTERIAL PATENCY WRIST A: ABNORMAL
BDY SITE: ABNORMAL
BODY TEMPERATURE: 37
COHGB MFR BLD: 1.4 % (ref 0–5)
GAS FLOW.O2 O2 DELIVERY SYS: ABNORMAL L/MIN
GLUCOSE BLD-MCNC: 188 MG/DL (ref 65–105)
GLUCOSE BLD-MCNC: 192 MG/DL (ref 65–105)
HCO3 ARTERIAL: 20.6 MMOL/L (ref 22–26)
METHEMOGLOBIN: 1 % (ref 0–1.9)
NEGATIVE BASE EXCESS, ART: 4.5 MMOL/L (ref 0–2)
O2 SAT, ARTERIAL: 94.1 % (ref 95–98)
PCO2 ARTERIAL: 34.8 MMHG (ref 35–45)
PH ARTERIAL: 7.38 (ref 7.35–7.45)
PO2 ARTERIAL: 84.6 MMHG (ref 80–100)
PT. POSITION: ABNORMAL
RESPIRATORY RATE: 18
TEXT FOR RESPIRATORY: ABNORMAL

## 2024-01-15 PROCEDURE — 99233 SBSQ HOSP IP/OBS HIGH 50: CPT | Performed by: INTERNAL MEDICINE

## 2024-01-15 PROCEDURE — 6370000000 HC RX 637 (ALT 250 FOR IP)

## 2024-01-15 PROCEDURE — 6360000002 HC RX W HCPCS: Performed by: INTERNAL MEDICINE

## 2024-01-15 PROCEDURE — 82805 BLOOD GASES W/O2 SATURATION: CPT

## 2024-01-15 PROCEDURE — 99232 SBSQ HOSP IP/OBS MODERATE 35: CPT | Performed by: PSYCHIATRY & NEUROLOGY

## 2024-01-15 PROCEDURE — 6360000002 HC RX W HCPCS

## 2024-01-15 PROCEDURE — 94640 AIRWAY INHALATION TREATMENT: CPT

## 2024-01-15 PROCEDURE — 2580000003 HC RX 258: Performed by: NURSE PRACTITIONER

## 2024-01-15 PROCEDURE — 6360000002 HC RX W HCPCS: Performed by: NURSE PRACTITIONER

## 2024-01-15 PROCEDURE — 2060000000 HC ICU INTERMEDIATE R&B

## 2024-01-15 PROCEDURE — 6370000000 HC RX 637 (ALT 250 FOR IP): Performed by: INTERNAL MEDICINE

## 2024-01-15 PROCEDURE — 2580000003 HC RX 258

## 2024-01-15 PROCEDURE — C9254 INJECTION, LACOSAMIDE: HCPCS | Performed by: NURSE PRACTITIONER

## 2024-01-15 PROCEDURE — 99233 SBSQ HOSP IP/OBS HIGH 50: CPT | Performed by: STUDENT IN AN ORGANIZED HEALTH CARE EDUCATION/TRAINING PROGRAM

## 2024-01-15 PROCEDURE — 36600 WITHDRAWAL OF ARTERIAL BLOOD: CPT

## 2024-01-15 PROCEDURE — 82947 ASSAY GLUCOSE BLOOD QUANT: CPT

## 2024-01-15 PROCEDURE — 6370000000 HC RX 637 (ALT 250 FOR IP): Performed by: PSYCHIATRY & NEUROLOGY

## 2024-01-15 RX ORDER — ONDANSETRON 4 MG/1
4 TABLET, ORALLY DISINTEGRATING ORAL EVERY 8 HOURS PRN
Status: CANCELLED | OUTPATIENT
Start: 2024-01-15

## 2024-01-15 RX ORDER — DILTIAZEM HYDROCHLORIDE 60 MG/1
60 TABLET, FILM COATED ORAL EVERY 6 HOURS SCHEDULED
Status: CANCELLED | OUTPATIENT
Start: 2024-01-15

## 2024-01-15 RX ORDER — FAMOTIDINE 20 MG/1
10 TABLET, FILM COATED ORAL
Status: CANCELLED | OUTPATIENT
Start: 2024-01-16

## 2024-01-15 RX ORDER — SODIUM CHLORIDE 0.9 % (FLUSH) 0.9 %
5-40 SYRINGE (ML) INJECTION EVERY 12 HOURS SCHEDULED
Status: CANCELLED | OUTPATIENT
Start: 2024-01-15

## 2024-01-15 RX ORDER — ACETAMINOPHEN 650 MG/1
650 SUPPOSITORY RECTAL EVERY 6 HOURS PRN
Status: CANCELLED | OUTPATIENT
Start: 2024-01-15

## 2024-01-15 RX ORDER — HYDROXYZINE 50 MG/1
50 TABLET, FILM COATED ORAL DAILY PRN
Status: CANCELLED | OUTPATIENT
Start: 2024-01-15

## 2024-01-15 RX ORDER — SODIUM BICARBONATE 650 MG/1
650 TABLET ORAL 3 TIMES DAILY
Status: DISCONTINUED | OUTPATIENT
Start: 2024-01-15 | End: 2024-01-16 | Stop reason: HOSPADM

## 2024-01-15 RX ORDER — ALBUTEROL SULFATE 2.5 MG/3ML
2.5 SOLUTION RESPIRATORY (INHALATION) EVERY 6 HOURS PRN
Status: CANCELLED | OUTPATIENT
Start: 2024-01-15

## 2024-01-15 RX ORDER — CETIRIZINE HYDROCHLORIDE 10 MG/1
5 TABLET ORAL DAILY
Status: CANCELLED | OUTPATIENT
Start: 2024-01-16

## 2024-01-15 RX ORDER — DEXAMETHASONE SODIUM PHOSPHATE 4 MG/ML
2 INJECTION, SOLUTION INTRA-ARTICULAR; INTRALESIONAL; INTRAMUSCULAR; INTRAVENOUS; SOFT TISSUE EVERY 8 HOURS
Status: CANCELLED | OUTPATIENT
Start: 2024-01-15

## 2024-01-15 RX ORDER — MIDODRINE HYDROCHLORIDE 5 MG/1
5 TABLET ORAL 2 TIMES DAILY PRN
Status: CANCELLED | OUTPATIENT
Start: 2024-01-15

## 2024-01-15 RX ORDER — AMIODARONE HYDROCHLORIDE 200 MG/1
100 TABLET ORAL EVERY MORNING
Status: CANCELLED | OUTPATIENT
Start: 2024-01-16

## 2024-01-15 RX ORDER — SODIUM CHLORIDE 9 MG/ML
INJECTION, SOLUTION INTRAVENOUS PRN
Status: CANCELLED | OUTPATIENT
Start: 2024-01-15

## 2024-01-15 RX ORDER — MIRTAZAPINE 15 MG/1
15 TABLET, FILM COATED ORAL NIGHTLY
Status: CANCELLED | OUTPATIENT
Start: 2024-01-15

## 2024-01-15 RX ORDER — HEPARIN SODIUM 5000 [USP'U]/ML
5000 INJECTION, SOLUTION INTRAVENOUS; SUBCUTANEOUS EVERY 8 HOURS SCHEDULED
Status: CANCELLED | OUTPATIENT
Start: 2024-01-15

## 2024-01-15 RX ORDER — SODIUM CHLORIDE 0.9 % (FLUSH) 0.9 %
10 SYRINGE (ML) INJECTION PRN
Status: CANCELLED | OUTPATIENT
Start: 2024-01-15

## 2024-01-15 RX ORDER — RISPERIDONE 1 MG/1
1 TABLET, ORALLY DISINTEGRATING ORAL 2 TIMES DAILY
Status: CANCELLED | OUTPATIENT
Start: 2024-01-15

## 2024-01-15 RX ORDER — ASPIRIN 81 MG/1
81 TABLET ORAL DAILY
Status: CANCELLED | OUTPATIENT
Start: 2024-01-16

## 2024-01-15 RX ORDER — BUDESONIDE AND FORMOTEROL FUMARATE DIHYDRATE 160; 4.5 UG/1; UG/1
2 AEROSOL RESPIRATORY (INHALATION)
Status: CANCELLED | OUTPATIENT
Start: 2024-01-15

## 2024-01-15 RX ORDER — FUROSEMIDE 40 MG/1
80 TABLET ORAL EVERY OTHER DAY
Status: CANCELLED | OUTPATIENT
Start: 2024-01-17

## 2024-01-15 RX ORDER — SODIUM BICARBONATE 650 MG/1
650 TABLET ORAL 3 TIMES DAILY
Status: CANCELLED | OUTPATIENT
Start: 2024-01-15

## 2024-01-15 RX ORDER — ATORVASTATIN CALCIUM 40 MG/1
40 TABLET, FILM COATED ORAL EVERY EVENING
Status: CANCELLED | OUTPATIENT
Start: 2024-01-15

## 2024-01-15 RX ORDER — ALBUTEROL SULFATE 90 UG/1
2 AEROSOL, METERED RESPIRATORY (INHALATION) EVERY 6 HOURS PRN
Status: CANCELLED | OUTPATIENT
Start: 2024-01-15

## 2024-01-15 RX ORDER — POLYETHYLENE GLYCOL 3350 17 G/17G
17 POWDER, FOR SOLUTION ORAL DAILY
Status: CANCELLED | OUTPATIENT
Start: 2024-01-16

## 2024-01-15 RX ORDER — DEXMEDETOMIDINE HYDROCHLORIDE 4 UG/ML
.1-1.5 INJECTION, SOLUTION INTRAVENOUS CONTINUOUS
Status: CANCELLED | OUTPATIENT
Start: 2024-01-15

## 2024-01-15 RX ORDER — SODIUM CHLORIDE 9 MG/ML
INJECTION, SOLUTION INTRAVENOUS CONTINUOUS
Status: CANCELLED | OUTPATIENT
Start: 2024-01-15

## 2024-01-15 RX ORDER — LIDOCAINE 4 G/G
1 PATCH TOPICAL DAILY
Status: CANCELLED | OUTPATIENT
Start: 2024-01-16

## 2024-01-15 RX ORDER — METOPROLOL SUCCINATE 50 MG/1
50 TABLET, EXTENDED RELEASE ORAL 2 TIMES DAILY
Status: CANCELLED | OUTPATIENT
Start: 2024-01-15

## 2024-01-15 RX ORDER — CLOPIDOGREL BISULFATE 75 MG/1
75 TABLET ORAL DAILY
Status: CANCELLED | OUTPATIENT
Start: 2024-01-16

## 2024-01-15 RX ORDER — HYDRALAZINE HYDROCHLORIDE 20 MG/ML
10 INJECTION INTRAMUSCULAR; INTRAVENOUS EVERY 6 HOURS PRN
Status: CANCELLED | OUTPATIENT
Start: 2024-01-15

## 2024-01-15 RX ORDER — ACETAMINOPHEN 325 MG/1
650 TABLET ORAL EVERY 6 HOURS PRN
Status: CANCELLED | OUTPATIENT
Start: 2024-01-15

## 2024-01-15 RX ORDER — LEVOTHYROXINE SODIUM 0.03 MG/1
25 TABLET ORAL
Status: CANCELLED | OUTPATIENT
Start: 2024-01-16

## 2024-01-15 RX ORDER — ONDANSETRON 2 MG/ML
4 INJECTION INTRAMUSCULAR; INTRAVENOUS EVERY 6 HOURS PRN
Status: CANCELLED | OUTPATIENT
Start: 2024-01-15

## 2024-01-15 RX ADMIN — HEPARIN SODIUM 5000 UNITS: 5000 INJECTION INTRAVENOUS; SUBCUTANEOUS at 17:47

## 2024-01-15 RX ADMIN — METOPROLOL SUCCINATE 50 MG: 50 TABLET, EXTENDED RELEASE ORAL at 08:53

## 2024-01-15 RX ADMIN — DILTIAZEM HYDROCHLORIDE 60 MG: 60 TABLET, FILM COATED ORAL at 13:11

## 2024-01-15 RX ADMIN — CETIRIZINE HYDROCHLORIDE 5 MG: 10 TABLET, FILM COATED ORAL at 08:52

## 2024-01-15 RX ADMIN — MIRTAZAPINE 15 MG: 15 TABLET, FILM COATED ORAL at 23:00

## 2024-01-15 RX ADMIN — DILTIAZEM HYDROCHLORIDE 60 MG: 60 TABLET, FILM COATED ORAL at 22:59

## 2024-01-15 RX ADMIN — LACOSAMIDE 100 MG: 10 INJECTION INTRAVENOUS at 22:47

## 2024-01-15 RX ADMIN — POLYETHYLENE GLYCOL 3350 17 G: 17 POWDER, FOR SOLUTION ORAL at 08:53

## 2024-01-15 RX ADMIN — HEPARIN SODIUM 5000 UNITS: 5000 INJECTION INTRAVENOUS; SUBCUTANEOUS at 22:47

## 2024-01-15 RX ADMIN — AMIODARONE HYDROCHLORIDE 100 MG: 200 TABLET ORAL at 08:53

## 2024-01-15 RX ADMIN — ASPIRIN 81 MG: 81 TABLET, COATED ORAL at 08:53

## 2024-01-15 RX ADMIN — LEVOTHYROXINE SODIUM 25 MCG: 0.03 TABLET ORAL at 05:36

## 2024-01-15 RX ADMIN — FAMOTIDINE 10 MG: 20 TABLET, FILM COATED ORAL at 05:36

## 2024-01-15 RX ADMIN — DILTIAZEM HYDROCHLORIDE 60 MG: 60 TABLET, FILM COATED ORAL at 02:52

## 2024-01-15 RX ADMIN — DILTIAZEM HYDROCHLORIDE 60 MG: 60 TABLET, FILM COATED ORAL at 05:35

## 2024-01-15 RX ADMIN — SODIUM BICARBONATE 650 MG: 650 TABLET ORAL at 22:59

## 2024-01-15 RX ADMIN — ATORVASTATIN CALCIUM 40 MG: 40 TABLET, FILM COATED ORAL at 17:48

## 2024-01-15 RX ADMIN — SODIUM BICARBONATE 650 MG: 650 TABLET ORAL at 15:23

## 2024-01-15 RX ADMIN — CLOPIDOGREL BISULFATE 75 MG: 75 TABLET ORAL at 08:53

## 2024-01-15 RX ADMIN — ANTI-FUNGAL POWDER MICONAZOLE NITRATE TALC FREE: 1.42 POWDER TOPICAL at 10:27

## 2024-01-15 RX ADMIN — RISPERIDONE 1 MG: 1 TABLET, ORALLY DISINTEGRATING ORAL at 22:51

## 2024-01-15 RX ADMIN — BUDESONIDE AND FORMOTEROL FUMARATE DIHYDRATE 2 PUFF: 160; 4.5 AEROSOL RESPIRATORY (INHALATION) at 18:56

## 2024-01-15 RX ADMIN — RISPERIDONE 1 MG: 1 TABLET, ORALLY DISINTEGRATING ORAL at 10:27

## 2024-01-15 RX ADMIN — DILTIAZEM HYDROCHLORIDE 60 MG: 60 TABLET, FILM COATED ORAL at 17:48

## 2024-01-15 RX ADMIN — DEXAMETHASONE SODIUM PHOSPHATE 2 MG: 4 INJECTION INTRA-ARTICULAR; INTRALESIONAL; INTRAMUSCULAR; INTRAVENOUS; SOFT TISSUE at 15:23

## 2024-01-15 RX ADMIN — LACOSAMIDE 100 MG: 10 INJECTION INTRAVENOUS at 10:29

## 2024-01-15 RX ADMIN — HEPARIN SODIUM 5000 UNITS: 5000 INJECTION INTRAVENOUS; SUBCUTANEOUS at 05:32

## 2024-01-15 RX ADMIN — DEXAMETHASONE SODIUM PHOSPHATE 2 MG: 4 INJECTION INTRA-ARTICULAR; INTRALESIONAL; INTRAMUSCULAR; INTRAVENOUS; SOFT TISSUE at 22:47

## 2024-01-15 RX ADMIN — HYDRALAZINE HYDROCHLORIDE 10 MG: 20 INJECTION INTRAMUSCULAR; INTRAVENOUS at 01:37

## 2024-01-15 RX ADMIN — ANTI-FUNGAL POWDER MICONAZOLE NITRATE TALC FREE: 1.42 POWDER TOPICAL at 22:51

## 2024-01-15 RX ADMIN — SODIUM CHLORIDE, PRESERVATIVE FREE 10 ML: 5 INJECTION INTRAVENOUS at 22:49

## 2024-01-15 RX ADMIN — SODIUM CHLORIDE, PRESERVATIVE FREE 10 ML: 5 INJECTION INTRAVENOUS at 08:53

## 2024-01-15 RX ADMIN — DEXAMETHASONE SODIUM PHOSPHATE 2 MG: 4 INJECTION INTRA-ARTICULAR; INTRALESIONAL; INTRAMUSCULAR; INTRAVENOUS; SOFT TISSUE at 05:32

## 2024-01-15 NOTE — PROGRESS NOTES
Comprehensive Nutrition Assessment    Type and Reason for Visit:  RD Nutrition Re-Screen/LOS    Nutrition Recommendations/Plan:   Will have food cut for pt prior to serving  Will Add Ensure supplements to all trays     Malnutrition Assessment:  Malnutrition Status:  At risk for malnutrition (Comment) (01/15/24 6800)    Context:  Acute Illness     Findings of the 6 clinical characteristics of malnutrition:  Energy Intake:  50% or less of estimated energy requirements for 5 or more days  Weight Loss:  No significant weight loss     Body Fat Loss:  No significant body fat loss     Muscle Mass Loss:  No significant muscle mass loss    Fluid Accumulation:  Mild Extremities   Strength:  Not Performed    Nutrition Assessment:    Pt admitted due to AMS. Intake is documented as less than 25% for previous meals. Pt sleeping at time of visit. Observed pt's lunch tray-no food was consumed. Nursing states she attempted to feed pt but she was not interested in eating. RN states pt did eat when fed by daughter yesterday.    Nutrition Related Findings:    mild edema to all extremities/face, Labs: Decadron, Remeron, BM 1/15, PMH: Stable meningioma, ESRD on HD, CVA, CHF, COPD Wound Type: Multiple, Pressure Injury, Stage I, Venous Stasis       Current Nutrition Intake & Therapies:    Average Meal Intake: 1-25%, %     ADULT DIET; Regular    Anthropometric Measures:  Height: 152.4 cm (5')  Ideal Body Weight (IBW): 100 lbs (45 kg)    Admission Body Weight: 63 kg (139 lb)  Current Body Weight: 64 kg (141 lb),   IBW. Weight Source: Bed Scale  Current BMI (kg/m2): 27.5  Usual Body Weight:  (125-149#)                       BMI Categories: Overweight (BMI 25.0-29.9)    Estimated Daily Nutrient Needs:  Energy Requirements Based On: Formula  Weight Used for Energy Requirements: Current  Energy (kcal/day): Beach x 1.2= 1300 kcal  Weight Used for Protein Requirements: Current  Protein (g/day): 1.5g/kg= 95 g     Fluid (ml/day): per

## 2024-01-15 NOTE — PROGRESS NOTES
[facility] PULMONARY,CRITICAL CARE & SLEEP   Dewey Denis MD/Rahul Noel MD/ Chuck Spain MD/Jesus Cisneros APRN AGACNP-BC, NP-C      Milly Minaya APRN NP-C     Romana Andino APRN NP-C                                           Pulmonary Critical Care Progress Note    Patient - Lupe Ojeda   Age - 73 y.o.   - 1950  MRN - [id number]  [id number]  Date of Admission - 2024  8:24 PM    Consulting Service/Physician:       Primary Care Physician: Kayleigh Cardoso MD    SUBJECTIVE:     Chief Complaint:   Chief Complaint   Patient presents with    Altered Mental Status     Worsening today    Encephalopathy  Subjective:    Patient completely obtunded.  She is not responsive for me at all.  Her chin is into her chest and she is drooling.  She is saturating fine on room air but completely unresponsive.  I spoke with the respiratory therapist.  Patient has had cyclical hypercapnia in the past but I do not see a blood gas documented since 2022.  We will obtain 1 now    VITALS  BP (!) 145/51   Pulse 84   Temp 98.5 °F (36.9 °C) (Oral)   Resp 27   Ht 1.524 m (5')   Wt 64 kg (141 lb 1.5 oz)   SpO2 95%   BMI 27.56 kg/m²   Wt Readings from Last 3 Encounters:   01/15/24 64 kg (141 lb 1.5 oz)   24 66 kg (145 lb 8.1 oz)   23 56.7 kg (125 lb)     I/O (24 Hours)    Intake/Output Summary (Last 24 hours) at 1/15/2024 1218  Last data filed at 1/15/2024 0833  Gross per 24 hour   Intake 20 ml   Output --   Net 20 ml     Ventilator:      Exam:   Physical Exam   Constitutional: Obtunded  HENT: Unremarkable  Neck: Neck supple.  Thick neck  Cardiovascular: Regular but distant  Pulmonary/Chest: Very severely diminished, very severe kyphoscoliosis  Abdominal: Soft  Neurological: Obtunded   Extremities: No edema or discoloration  Infusions:      sodium chloride      sodium chloride 35 mL/hr at 24 0626    dexmedeTOMIDine Stopped (24 0738)     Meds:

## 2024-01-15 NOTE — CARE COORDINATION
ONGOING DISCHARGE PLAN:      Worsening mental status.    Neurology recommend transfer to [facility] for neurosurgery. Patient is accepted, but waiting on a bed per [facility].     MRI brain--meningioma with vasogenic edema  IV decadron 2 mg every 8 hours    EEG-IV vimpat     Palliative Care consult    Will continue to follow for additional discharge needs.    If patient is discharged prior to next notation, then this note serves as note for discharge by case management.    Electronically signed by Debbie Mcallister RN on 1/15/2024 at 3:55 PM

## 2024-01-15 NOTE — PROGRESS NOTES
NEPHROLOGY PROGRESS NOTE    Patient :  Lupe Ojeda; 73 y.o. MRN# [id number]  Location:  2013/2013-01  Attending:  Ludwig Daniel MD  Admit Date:  1/8/2024   Hospital Day: 7    Reason for consultation: Management of hemodialysis dependent end-stage renal disease.    Consulting physician: Ludwig Daniel MD.    Chief complaint at presentation: Altered mental status.    Interval history: Patient was seen and examined today and she remains confused and disoriented.  She is however not in obvious cardiopulmonary distress.    History of Present Illness:  This is a 73 y.o. female with a significant past medical history of Cerebrovascular accident, atrial fibrillation status post CABG and recent PCI to left circumflex November 2023,  combined diastolic and systolic heart failure, essential hypertension, partial deafness and  ESRD on hemodialysis Tuesday Thursday Saturday at [facility] dialysis unit under my care.Patient had a recent Hospitalization for UTI/PNA and was discharged on 1/6/24.  Patient came back again to the hospital on 1/8/24 with c/o altered mental status, restlessness, confusion.  UA moderate Hb, small L.E;  CT scan of the head showed no acute abnormality.    Current Medications:    metoprolol succinate (TOPROL XL) extended release tablet 50 mg, BID  dilTIAZem (CARDIZEM) tablet 60 mg, 4 times per day  dexAMETHasone (DECADRON) injection 2 mg, Q8H  risperiDONE (RISPERDAL M-TABS) disintegrating tablet 1 mg, BID  hydrALAZINE (APRESOLINE) injection 10 mg, Q6H PRN  lacosamide (VIMPAT) 100 mg in sodium chloride 0.9 % 60 mL IVPB, BID  albuterol (PROVENTIL) (2.5 MG/3ML) 0.083% nebulizer solution 2.5 mg, Q6H PRN  albuterol sulfate HFA (PROVENTIL;VENTOLIN;PROAIR) 108 (90 Base) MCG/ACT inhaler 2 puff, Q6H PRN  amiodarone (CORDARONE) tablet 100 mg, QAM  aspirin EC tablet 81 mg, Daily  atorvastatin (LIPITOR) tablet 40 mg, QPM  clopidogrel (PLAVIX) tablet 75 mg, Daily  famotidine (PEPCID) tablet 10 mg, QAM

## 2024-01-15 NOTE — PROGRESS NOTES
[facility]   IN-PATIENT SERVICE   [facility]    HISTORY AND PHYSICAL EXAMINATION            Date:   1/15/2024  Patient name:  Lupe Ojeda  Date of admission:  1/8/2024  8:24 PM  MRN:   [id number]  Account:  [id number]  YOB: 1950  PCP:    Kayleigh Cardoso MD  Room:   2013/2013-01  Code Status:    Full Code    Chief Complaint:     Chief Complaint   Patient presents with    Altered Mental Status     Worsening today        History Obtained From:     patient, electronic medical record    History of Present Illness:     The patient is a 73 y.o.  Non- / non  female who presents with Altered Mental Status (Worsening today )   and she is admitted to the hospital for the management of altered mental status  Patient has past medical history of multiple medical problem which include coronary artery disease s/p CABG, heart failure with reduced ejection fraction, history of stroke, history of hematuria, history of A-fib not on anticoagulation due to bleeding risk, COPD, ESRD on hemodialysis, bilateral cataract, hearing disability  Patient was recently discharged from the hospital only few days ago when she was admitted with altered mental status, underwent CT head suggestive of meningioma with mass effect, patient also underwent EEG suggestive of sharp waves concerning for possible seizures, started on antiepileptics  Patient family did not want to get her discharged to SNF  Patient eventually discharged home  Patient presented again with increasing confusion, as per daughter at bedside, patient was talking gibberish,  She was started on Seroquel which was discontinued outpatient due to side effect  Denying complaints of chest pain, shortness of breath  Patient is compliant with her diet medication  Last admission was planned to get MRI done which unfortunately could not be done because of patient agitation    Worsening confusion transferred to ICU for Precedex  tablet by mouth 2 times daily for 60 days. Max Daily Amount: 200 mg 1/5/24 3/5/24 Yes Melyssa Marshall MD   furosemide (LASIX) 80 MG tablet Take 1 tablet by mouth every other day Water pill, does not take on dialysis days. 12/11/23  Yes Kayleigh Cardoso MD   amiodarone (PACERONE) 100 MG tablet Take 1 tablet by mouth every morning 12/11/23  Yes Kayleigh Cardoso MD   aspirin 81 MG EC tablet Take 1 tablet by mouth daily Always take with food, stop taking it if any black stools or rectal bleeding 12/11/23  Yes Kayleigh Cardoso MD   clopidogrel (PLAVIX) 75 MG tablet Take 1 tablet by mouth daily 11/29/23  Yes Park Pereira APRN - CNP   desloratadine (CLARINEX) 5 MG tablet Take 1 tablet by mouth daily 10/22/23  Yes Mino Berry MD   ketorolac (ACULAR) 0.5 % ophthalmic solution INSTILL 1 DROP INTO AFFECTED EYE 4 TIMES DAILY STARTING 1 DAY BEFORE SURGERY 10/27/23  Yes Mino Berry MD   moxifloxacin (VIGAMOX) 0.5 % ophthalmic solution INSTILL 1 DROP INTO AFFECTED EYE 4 TIMES DAILY STARTING 1 DAY BEFORE SURGERY 10/27/23  Yes Mino Berry MD   prednisoLONE acetate (PRED FORTE) 1 % ophthalmic suspension INSTILL 1 DROP INTO AFFECTED EYE 4 TIMES DAILY STARTING 1 DAY BEFORE SURGERY 10/27/23  Yes Mino Berry MD   mirtazapine (REMERON) 15 MG tablet Take 1 tablet by mouth nightly Dose increased 11/17/2023 . 30 days RX till the mail order comes 11/17/23  Yes Kayleigh Cardoso MD   mupirocin (BACTROBAN) 2 % ointment Apply topically 3 times daily x 10 days. 11/17/23  Yes Kayleigh Cardoso MD   famotidine (PEPCID) 20 MG tablet Take 1 tablet by mouth every morning (before breakfast) Replacing omeprazole due to end-stage kidney disease 10/27/23  Yes Kayleigh Cardoso MD   lamoTRIgine (LAMICTAL) 25 MG tablet Take 1 tablet by mouth 2 times daily 10/25/23  Yes Alyssa Darnell MD   atorvastatin (LIPITOR) 40 MG tablet Take 1 tablet by mouth every evening Take 1 tablet by mouth once  7:39 AM   Result Value Ref Range    POC Glucose 188 (H) 65 - 105 mg/dL   POC Glucose Fingerstick    Collection Time: 01/15/24 11:27 AM   Result Value Ref Range    POC Glucose 192 (H) 65 - 105 mg/dL         Imaging/Diagnostics:        Assessment :      Primary Problem  Altered mental status    Active Hospital Problems    Diagnosis Date Noted    Permanent atrial fibrillation (HCC) [I48.21] 01/09/2024     Priority: High    Ischemic cardiomyopathy [I25.5] 01/09/2024     Priority: High    Acute encephalopathy [G93.40] 01/11/2024    History of end stage renal disease [Z87.448] 01/10/2024    Encephalopathy acute [G93.40] 08/17/2023    Altered mental status [R41.82] 05/26/2021       Plan:     Patient status Admit as inpatient in the  Progressive Unit/Step down  The patient is a 73 y.o.  Non- / non  female who presents with Altered Mental Status (Worsening today )   and she is admitted to the hospital for the management of altered mental status  Patient has past medical history of multiple medical problem which include coronary artery disease s/p CABG, heart failure with reduced ejection fraction, history of stroke, history of hematuria, history of A-fib not on anticoagulation due to bleeding risk, COPD, ESRD on hemodialysis, bilateral cataract, hearing disability  Patient was recently discharged from the hospital only few days ago when she was admitted with altered mental status, underwent CT head suggestive of meningioma with mass effect, patient also underwent EEG suggestive of sharp waves concerning for possible seizures, started on antiepileptics  Due to patient's encephalopathy requiring admission to ICU for Precedex drip    Patient admitted with increasing confusion, may have underlying subclinical seizures, concern for UTI, , final urine culture is negative, will discontinue antibiotics,   History of right middle cranial fossa meningioma, with vasogenic edema, patient was treated with steroid last time,

## 2024-01-15 NOTE — PROGRESS NOTES
BEHAVIORAL HEALTH FOLLOW-UP NOTE     1/15/2024     Patient was seen and examined in person, Chart reviewed   Patient's case discussed with staff/team    Chief Complaint: obtunded    Interim History:     The patient is a 73 y.o. female with no significant past psychiatric history and pmh of stroke, CAD status post CABG, hypertension, bilateral cataract, ESRD on HD, CHF, chronic hearing loss, history of right temporal meningioma, cognitive impairment/dementia, afib not on AC due to bleeding risk per cardiology, who is currently admitted for altered mentation. Concern for UTI, but final urine culture is negative. Several repeated failed attempts for head MRI as patient could not tolerate laying flat, got more agitated, and was screaming. Psychiatry was consulted for agitation.     Patient seen and evaluated at bedside 1/15. Noted vital signs within normal limits except for elevated BP. Patient is obtunded and difficult to arouse, leaning forward with her chin touching her chest. Responds to loud verbal stimuli and firm tapping. Mumbles softly and incoherently. Nursing reports she is refusing to eat and is refusing oral medications, although took about half of her oral medications before spitting the rest out. Currently off Precedex drip. Nursing states family has not been in to see her today.    Patient seen and evaluated at bedside 1/12. Noted vital signs within normal limits, elevated BP. On Precedex drip 1.2 mcg/min up from 0.6 mcg/min. Upon trying to assess orientation, she was mumbling and could not talk to her further.     The daughters report a history of depression for which she has been taking mirtazapine, along with hydroxyzine PRN for anxiety with dialysis sessions. They also report an episode of hallucinations and delusions that occurred about 20 years ago which resulted in the patient moving to [place], dying her hair black, and trying to change her identity since she believed people were coming after  precedex.    Continue risperidone 1 mg BID.  PRN hydroxyzine for anxiety.  Continue mirtazapine nightly.   Attempt to develop insight and conduct supportive therapy.  Primary team to have discussion with family about code status.  Move to PCU per primary team.    PSYCHOTHERAPY/COUNSELING:  [] Therapeutic interview  [x] Supportive  [] CBT  [] Ongoing  [] Other                                     --Ramos Osbaldo, OMS-IV on 1/15/2024 at 12:50 PM    An electronic signature was used to authenticate this note. .I independently saw and evaluated the patient.  I reviewed the  documentation of the NP.  Any additional comments or changes to the    documentation are stated below otherwise agree with assessment.      -The patient was seen at bedside.  She is quite lethargic.  She has been receiving risperidone.  Her BUN and creatinine are worsening.  At this time no changes have been made to her medications.      PLAN  Medications as noted above  Attempt to develop insight    Psycho-education conducted.  Supportive Therapy conducted.  Follow-up daily while on inpatient unit    Electronically signed by LESLIE BAUTISTA MD on 1/15/24 at 5:38 PM EST

## 2024-01-15 NOTE — PROCEDURES
[address] [address]                          ELECTROENCEPHALOGRAM REPORT    PATIENT NAME: CASS NELSON                   :        1950  MED REC NO:   [id number]                              ROOM:         ACCOUNT NO:   [id number]                           ADMIT DATE: 2024  PROVIDER:     Josue Aguero MD    DATE OF EE2024    ATTENDING OF RECORD:  Ludwig Daniel MD    REASON FOR STUDY:  This is a 73-year-old lady presents with acute  encephalopathy with seizure disorder with history of right hemispheric  meningioma.    MEDICATIONS:  Decadron, Risperdal, Vimpat, Proventil, aspirin, Lipitor,  Plavix, Pepcid, Lasix, Synthroid, Toprol, ProAmatine, Remeron, Spiriva.    EEG FINDINGS:  This is a 20-channel EEG and one EKG channel recording  performed on a patient who is described to be stuporous.  The patient  shows delayed waking rhythms.  Background activity consists of poorly  regulated 7 Hz activity in the 40-60 microvolt range more prominent in  the posterior head area showing some reactivity to eye opening and  closing.  Over the anterior head regions, there are 15-20 Hz activity in  the 20-30 microvolt range.  The record is prominent for moderate degree  of medium amplitude 4-7 Hz activity seen throughout all head areas  during waking state.  Furthermore, there is presence of intermittent  generalized slow wave and sharp discharge.  With drowsiness and sleep,  there is further intrusion of slower frequencies in the theta and delta  band.  Hyperventilation not performed.  Photic stimulation shows no  change in the record.    IMPRESSION:  This EEG shows the presence of moderate diffuse slowing  superimposed with intermittent generalized epileptiform discharge.  This  is concordant with diffuse encephalopathy along with suspected  underlying epileptiform disorder.  Clinical correlation is

## 2024-01-15 NOTE — PROGRESS NOTES
Pt transferred to room 2092 on pcu. Ok while awaiting st vs bed per dr garcía. Ross montes and mirza oncoming rn notified to start process for st vs transfer to have neurosurgery take over case for meningioma.

## 2024-01-15 NOTE — PROGRESS NOTES
Physical Therapy        Physical Therapy Cancel Note      DATE: 1/15/2024    NAME: Lupe Ojeda  MRN: [id number]   : 1950      Patient not seen this date for Physical Therapy due to:    1/15/24 Pt to be transferred to [facility]; PT Eval deferred.       Electronically signed by Gayle Melton PT on 1/15/2024 at 4:08 PM

## 2024-01-15 NOTE — PROGRESS NOTES
[facility] Neurology   IN-PATIENT SERVICE   [facility]    Progress note             Date:   1/15/2024  Patient name:  Lupe Ojeda  Date of admission:  1/8/2024  8:24 PM  MRN:   [id number]  Account:  [id number]  YOB: 1950  PCP:    Kayleigh Cardoso MD  Room:   2013/2013-01  Code Status:    Full Code    Chief Complaint:     Chief Complaint   Patient presents with    Altered Mental Status     Worsening today        Interval hx:   The Patient was seen and examined at bedside  Is vitally stable alert not oriented to person/place time.   No acute events overnight  Patient with head tucked increased oral secretions and drooling noted on arrival to her room. Appears severely encephalopathic overall however difficult to obtain baseline as patient is mostly aphasic and altered. Care discussed with patients daughter Orah and bedside nursing. Agree with overall rapid cognitive decline and family concerns requesting second NS opinion. Given this concern I did consult and discuss with our NS team and agree overall with progression and worsening in her MRI brain and right temporal pole brain mass and plan to transfer to [facility] for further NS input and possible inpatient intervention. Case further then discussed with our neurology primary service and accepting of patients care given stable medical condition and stepdown status today out of MICU.   Brief History of Present Illness:     The patient is a 73 y.o.   female who presents with Altered Mental Status (Worsening today )   and she is admitted to the hospital for the management of worsening mentation and cognitive decline.      Neurology consultation is requested for worsening confusion with nonsensical speech, bizarre behaviors and agitation since after discharged few days ago from the hospital. Patient was evaluated by neurology on 12/31/2023 for confusion from UTI and pneumonic process.  Patient was noted to have

## 2024-01-15 NOTE — PROGRESS NOTES
Neuro Attending recommending  transfer to [facility] for brain surg with meningioma vs GBM  Neuro hospitalist is wiling to accept per Dr Fuentes(rounding Neuro Attending)  Will call marija Silva MD  1/15/2024

## 2024-01-16 ENCOUNTER — HOSPITAL ENCOUNTER (INPATIENT)
Age: 74
LOS: 7 days | Discharge: NURSING, REHAB OR LONG-TERM CARE | DRG: 080 | End: 2024-01-23
Attending: PSYCHIATRY & NEUROLOGY | Admitting: PSYCHIATRY & NEUROLOGY
Payer: MEDICARE

## 2024-01-16 VITALS
HEART RATE: 112 BPM | RESPIRATION RATE: 16 BRPM | OXYGEN SATURATION: 94 % | WEIGHT: 139.11 LBS | SYSTOLIC BLOOD PRESSURE: 125 MMHG | TEMPERATURE: 98.4 F | BODY MASS INDEX: 27.31 KG/M2 | DIASTOLIC BLOOD PRESSURE: 63 MMHG | HEIGHT: 60 IN

## 2024-01-16 PROBLEM — G93.40 ENCEPHALOPATHY: Status: ACTIVE | Noted: 2024-01-16

## 2024-01-16 LAB
ALBUMIN SERPL-MCNC: 3.4 G/DL (ref 3.5–5.2)
ANION GAP SERPL CALCULATED.3IONS-SCNC: 15 MMOL/L (ref 9–17)
BUN SERPL-MCNC: 48 MG/DL (ref 8–23)
CALCIUM SERPL-MCNC: 9 MG/DL (ref 8.6–10.4)
CHLORIDE SERPL-SCNC: 105 MMOL/L (ref 98–107)
CO2 SERPL-SCNC: 20 MMOL/L (ref 20–31)
CREAT SERPL-MCNC: 3.8 MG/DL (ref 0.5–0.9)
GFR SERPL CREATININE-BSD FRML MDRD: 12 ML/MIN/1.73M2
GLUCOSE BLD-MCNC: 165 MG/DL (ref 65–105)
GLUCOSE BLD-MCNC: 328 MG/DL (ref 65–105)
GLUCOSE SERPL-MCNC: 193 MG/DL (ref 70–99)
PHOSPHATE SERPL-MCNC: 5.7 MG/DL (ref 2.6–4.5)
POTASSIUM SERPL-SCNC: 4.5 MMOL/L (ref 3.7–5.3)
SODIUM SERPL-SCNC: 140 MMOL/L (ref 135–144)

## 2024-01-16 PROCEDURE — 6360000002 HC RX W HCPCS: Performed by: NURSE PRACTITIONER

## 2024-01-16 PROCEDURE — 6370000000 HC RX 637 (ALT 250 FOR IP): Performed by: INTERNAL MEDICINE

## 2024-01-16 PROCEDURE — C9254 INJECTION, LACOSAMIDE: HCPCS | Performed by: NURSE PRACTITIONER

## 2024-01-16 PROCEDURE — 6370000000 HC RX 637 (ALT 250 FOR IP): Performed by: PSYCHIATRY & NEUROLOGY

## 2024-01-16 PROCEDURE — 6370000000 HC RX 637 (ALT 250 FOR IP)

## 2024-01-16 PROCEDURE — 99232 SBSQ HOSP IP/OBS MODERATE 35: CPT | Performed by: INTERNAL MEDICINE

## 2024-01-16 PROCEDURE — 36415 COLL VENOUS BLD VENIPUNCTURE: CPT

## 2024-01-16 PROCEDURE — 80069 RENAL FUNCTION PANEL: CPT

## 2024-01-16 PROCEDURE — 6360000002 HC RX W HCPCS

## 2024-01-16 PROCEDURE — 90935 HEMODIALYSIS ONE EVALUATION: CPT

## 2024-01-16 PROCEDURE — 2000000000 HC ICU R&B

## 2024-01-16 PROCEDURE — 2580000003 HC RX 258

## 2024-01-16 PROCEDURE — 99232 SBSQ HOSP IP/OBS MODERATE 35: CPT | Performed by: PSYCHIATRY & NEUROLOGY

## 2024-01-16 PROCEDURE — 82947 ASSAY GLUCOSE BLOOD QUANT: CPT

## 2024-01-16 PROCEDURE — 2580000003 HC RX 258: Performed by: NURSE PRACTITIONER

## 2024-01-16 PROCEDURE — 6360000002 HC RX W HCPCS: Performed by: INTERNAL MEDICINE

## 2024-01-16 RX ORDER — SEVELAMER CARBONATE FOR ORAL SUSPENSION 800 MG/1
0.8 POWDER, FOR SUSPENSION ORAL
Status: DISCONTINUED | OUTPATIENT
Start: 2024-01-16 | End: 2024-01-16 | Stop reason: HOSPADM

## 2024-01-16 RX ORDER — BUDESONIDE AND FORMOTEROL FUMARATE DIHYDRATE 160; 4.5 UG/1; UG/1
2 AEROSOL RESPIRATORY (INHALATION)
Status: DISCONTINUED | OUTPATIENT
Start: 2024-01-17 | End: 2024-01-23 | Stop reason: HOSPADM

## 2024-01-16 RX ORDER — HYDRALAZINE HYDROCHLORIDE 20 MG/ML
10 INJECTION INTRAMUSCULAR; INTRAVENOUS EVERY 6 HOURS PRN
Status: DISCONTINUED | OUTPATIENT
Start: 2024-01-16 | End: 2024-01-23 | Stop reason: HOSPADM

## 2024-01-16 RX ORDER — SODIUM CHLORIDE 0.9 % (FLUSH) 0.9 %
10 SYRINGE (ML) INJECTION PRN
Status: DISCONTINUED | OUTPATIENT
Start: 2024-01-16 | End: 2024-01-23 | Stop reason: HOSPADM

## 2024-01-16 RX ORDER — CETIRIZINE HYDROCHLORIDE 10 MG/1
5 TABLET ORAL DAILY
Status: DISCONTINUED | OUTPATIENT
Start: 2024-01-17 | End: 2024-01-23 | Stop reason: HOSPADM

## 2024-01-16 RX ORDER — ATORVASTATIN CALCIUM 40 MG/1
40 TABLET, FILM COATED ORAL EVERY EVENING
Status: DISCONTINUED | OUTPATIENT
Start: 2024-01-17 | End: 2024-01-23 | Stop reason: HOSPADM

## 2024-01-16 RX ORDER — FAMOTIDINE 20 MG/1
10 TABLET, FILM COATED ORAL
Status: DISCONTINUED | OUTPATIENT
Start: 2024-01-17 | End: 2024-01-20

## 2024-01-16 RX ORDER — SODIUM CHLORIDE 0.9 % (FLUSH) 0.9 %
5-40 SYRINGE (ML) INJECTION EVERY 12 HOURS SCHEDULED
Status: DISCONTINUED | OUTPATIENT
Start: 2024-01-17 | End: 2024-01-23 | Stop reason: HOSPADM

## 2024-01-16 RX ORDER — DEXAMETHASONE SODIUM PHOSPHATE 4 MG/ML
2 INJECTION, SOLUTION INTRA-ARTICULAR; INTRALESIONAL; INTRAMUSCULAR; INTRAVENOUS; SOFT TISSUE EVERY 8 HOURS
Status: DISCONTINUED | OUTPATIENT
Start: 2024-01-17 | End: 2024-01-23

## 2024-01-16 RX ORDER — LEVOTHYROXINE SODIUM 0.05 MG/1
25 TABLET ORAL
Status: DISCONTINUED | OUTPATIENT
Start: 2024-01-17 | End: 2024-01-23 | Stop reason: HOSPADM

## 2024-01-16 RX ORDER — ASPIRIN 81 MG/1
81 TABLET ORAL DAILY
Status: DISCONTINUED | OUTPATIENT
Start: 2024-01-17 | End: 2024-01-23 | Stop reason: HOSPADM

## 2024-01-16 RX ORDER — ACETAMINOPHEN 325 MG/1
650 TABLET ORAL EVERY 6 HOURS PRN
Status: DISCONTINUED | OUTPATIENT
Start: 2024-01-16 | End: 2024-01-23 | Stop reason: HOSPADM

## 2024-01-16 RX ORDER — SEVELAMER CARBONATE 800 MG/1
800 TABLET, FILM COATED ORAL
Status: DISCONTINUED | OUTPATIENT
Start: 2024-01-16 | End: 2024-01-16

## 2024-01-16 RX ORDER — LIDOCAINE 4 G/G
1 PATCH TOPICAL DAILY
Status: DISCONTINUED | OUTPATIENT
Start: 2024-01-17 | End: 2024-01-23 | Stop reason: HOSPADM

## 2024-01-16 RX ORDER — ACETAMINOPHEN 650 MG/1
650 SUPPOSITORY RECTAL EVERY 6 HOURS PRN
Status: DISCONTINUED | OUTPATIENT
Start: 2024-01-16 | End: 2024-01-23 | Stop reason: HOSPADM

## 2024-01-16 RX ORDER — DEXMEDETOMIDINE HYDROCHLORIDE 4 UG/ML
.1-1.5 INJECTION, SOLUTION INTRAVENOUS CONTINUOUS
Status: DISCONTINUED | OUTPATIENT
Start: 2024-01-17 | End: 2024-01-17

## 2024-01-16 RX ORDER — ALBUTEROL SULFATE 2.5 MG/3ML
2.5 SOLUTION RESPIRATORY (INHALATION) EVERY 6 HOURS PRN
Status: DISCONTINUED | OUTPATIENT
Start: 2024-01-16 | End: 2024-01-23 | Stop reason: HOSPADM

## 2024-01-16 RX ORDER — SODIUM CHLORIDE 9 MG/ML
INJECTION, SOLUTION INTRAVENOUS PRN
Status: DISCONTINUED | OUTPATIENT
Start: 2024-01-16 | End: 2024-01-23 | Stop reason: HOSPADM

## 2024-01-16 RX ORDER — POLYETHYLENE GLYCOL 3350 17 G/17G
17 POWDER, FOR SOLUTION ORAL DAILY
Status: DISCONTINUED | OUTPATIENT
Start: 2024-01-17 | End: 2024-01-23 | Stop reason: HOSPADM

## 2024-01-16 RX ORDER — HEPARIN SODIUM 5000 [USP'U]/ML
5000 INJECTION, SOLUTION INTRAVENOUS; SUBCUTANEOUS EVERY 8 HOURS SCHEDULED
Status: DISCONTINUED | OUTPATIENT
Start: 2024-01-17 | End: 2024-01-23 | Stop reason: HOSPADM

## 2024-01-16 RX ORDER — MIDODRINE HYDROCHLORIDE 5 MG/1
5 TABLET ORAL 2 TIMES DAILY PRN
Status: DISCONTINUED | OUTPATIENT
Start: 2024-01-16 | End: 2024-01-23 | Stop reason: HOSPADM

## 2024-01-16 RX ORDER — DILTIAZEM HYDROCHLORIDE 60 MG/1
60 TABLET, FILM COATED ORAL EVERY 6 HOURS SCHEDULED
Status: DISCONTINUED | OUTPATIENT
Start: 2024-01-17 | End: 2024-01-19

## 2024-01-16 RX ORDER — METOPROLOL SUCCINATE 25 MG/1
50 TABLET, EXTENDED RELEASE ORAL 2 TIMES DAILY
Status: DISCONTINUED | OUTPATIENT
Start: 2024-01-17 | End: 2024-01-17

## 2024-01-16 RX ORDER — MIRTAZAPINE 15 MG/1
15 TABLET, FILM COATED ORAL NIGHTLY
Status: DISCONTINUED | OUTPATIENT
Start: 2024-01-17 | End: 2024-01-23 | Stop reason: HOSPADM

## 2024-01-16 RX ORDER — HYDROXYZINE 50 MG/1
50 TABLET, FILM COATED ORAL DAILY PRN
Status: DISCONTINUED | OUTPATIENT
Start: 2024-01-16 | End: 2024-01-23 | Stop reason: HOSPADM

## 2024-01-16 RX ORDER — ONDANSETRON 2 MG/ML
4 INJECTION INTRAMUSCULAR; INTRAVENOUS EVERY 6 HOURS PRN
Status: DISCONTINUED | OUTPATIENT
Start: 2024-01-16 | End: 2024-01-23 | Stop reason: HOSPADM

## 2024-01-16 RX ORDER — SODIUM BICARBONATE 650 MG/1
650 TABLET ORAL 3 TIMES DAILY
Status: DISCONTINUED | OUTPATIENT
Start: 2024-01-17 | End: 2024-01-22

## 2024-01-16 RX ORDER — ALBUTEROL SULFATE 90 UG/1
2 AEROSOL, METERED RESPIRATORY (INHALATION) EVERY 6 HOURS PRN
Status: DISCONTINUED | OUTPATIENT
Start: 2024-01-16 | End: 2024-01-18 | Stop reason: SDUPTHER

## 2024-01-16 RX ORDER — CLOPIDOGREL BISULFATE 75 MG/1
75 TABLET ORAL DAILY
Status: DISCONTINUED | OUTPATIENT
Start: 2024-01-17 | End: 2024-01-23 | Stop reason: HOSPADM

## 2024-01-16 RX ORDER — AMIODARONE HYDROCHLORIDE 200 MG/1
100 TABLET ORAL EVERY MORNING
Status: DISCONTINUED | OUTPATIENT
Start: 2024-01-17 | End: 2024-01-23 | Stop reason: HOSPADM

## 2024-01-16 RX ORDER — ONDANSETRON 4 MG/1
4 TABLET, ORALLY DISINTEGRATING ORAL EVERY 8 HOURS PRN
Status: DISCONTINUED | OUTPATIENT
Start: 2024-01-16 | End: 2024-01-23 | Stop reason: HOSPADM

## 2024-01-16 RX ORDER — FUROSEMIDE 40 MG/1
80 TABLET ORAL EVERY OTHER DAY
Status: DISCONTINUED | OUTPATIENT
Start: 2024-01-17 | End: 2024-01-23 | Stop reason: HOSPADM

## 2024-01-16 RX ORDER — SODIUM CHLORIDE 9 MG/ML
INJECTION, SOLUTION INTRAVENOUS CONTINUOUS
Status: DISCONTINUED | OUTPATIENT
Start: 2024-01-17 | End: 2024-01-21

## 2024-01-16 RX ORDER — RISPERIDONE 1 MG/1
1 TABLET, ORALLY DISINTEGRATING ORAL 2 TIMES DAILY
Status: DISCONTINUED | OUTPATIENT
Start: 2024-01-17 | End: 2024-01-21

## 2024-01-16 RX ADMIN — SODIUM BICARBONATE 650 MG: 650 TABLET ORAL at 20:43

## 2024-01-16 RX ADMIN — DEXAMETHASONE SODIUM PHOSPHATE 2 MG: 4 INJECTION INTRA-ARTICULAR; INTRALESIONAL; INTRAMUSCULAR; INTRAVENOUS; SOFT TISSUE at 14:09

## 2024-01-16 RX ADMIN — RISPERIDONE 1 MG: 1 TABLET, ORALLY DISINTEGRATING ORAL at 20:43

## 2024-01-16 RX ADMIN — LACOSAMIDE 100 MG: 10 INJECTION INTRAVENOUS at 09:29

## 2024-01-16 RX ADMIN — HEPARIN SODIUM 5000 UNITS: 5000 INJECTION INTRAVENOUS; SUBCUTANEOUS at 20:40

## 2024-01-16 RX ADMIN — LACOSAMIDE 100 MG: 10 INJECTION INTRAVENOUS at 20:37

## 2024-01-16 RX ADMIN — HEPARIN SODIUM 5000 UNITS: 5000 INJECTION INTRAVENOUS; SUBCUTANEOUS at 05:23

## 2024-01-16 RX ADMIN — SEVELAMER CARBONATE FOR ORAL SUSPENSION 0.8 G: 800 POWDER, FOR SUSPENSION ORAL at 17:16

## 2024-01-16 RX ADMIN — MUPIROCIN: 20 OINTMENT TOPICAL at 10:47

## 2024-01-16 RX ADMIN — DEXAMETHASONE SODIUM PHOSPHATE 2 MG: 4 INJECTION INTRA-ARTICULAR; INTRALESIONAL; INTRAMUSCULAR; INTRAVENOUS; SOFT TISSUE at 05:24

## 2024-01-16 RX ADMIN — DEXAMETHASONE SODIUM PHOSPHATE 2 MG: 4 INJECTION INTRA-ARTICULAR; INTRALESIONAL; INTRAMUSCULAR; INTRAVENOUS; SOFT TISSUE at 20:39

## 2024-01-16 RX ADMIN — ANTI-FUNGAL POWDER MICONAZOLE NITRATE TALC FREE: 1.42 POWDER TOPICAL at 20:43

## 2024-01-16 RX ADMIN — SODIUM CHLORIDE, PRESERVATIVE FREE 10 ML: 5 INJECTION INTRAVENOUS at 20:39

## 2024-01-16 RX ADMIN — ATORVASTATIN CALCIUM 40 MG: 40 TABLET, FILM COATED ORAL at 16:33

## 2024-01-16 RX ADMIN — DILTIAZEM HYDROCHLORIDE 60 MG: 60 TABLET, FILM COATED ORAL at 16:33

## 2024-01-16 RX ADMIN — HEPARIN SODIUM 5000 UNITS: 5000 INJECTION INTRAVENOUS; SUBCUTANEOUS at 14:20

## 2024-01-16 RX ADMIN — ANTI-FUNGAL POWDER MICONAZOLE NITRATE TALC FREE: 1.42 POWDER TOPICAL at 10:47

## 2024-01-16 RX ADMIN — MIRTAZAPINE 15 MG: 15 TABLET, FILM COATED ORAL at 20:43

## 2024-01-16 RX ADMIN — SODIUM BICARBONATE 650 MG: 650 TABLET ORAL at 14:09

## 2024-01-16 ASSESSMENT — PAIN SCALES - GENERAL: PAINLEVEL_OUTOF10: 0

## 2024-01-16 NOTE — PROGRESS NOTES
BEHAVIORAL HEALTH FOLLOW-UP NOTE     1/16/2024     Patient was seen and examined in person, Chart reviewed   Patient's case discussed with staff/team    Chief Complaint: lethargic     Interim History:     The patient is a 73 y.o. female with no significant past psychiatric history and pmh of stroke, CAD status post CABG, hypertension, bilateral cataract, ESRD on HD, CHF, chronic hearing loss, history of right temporal meningioma, cognitive impairment/dementia, afib not on AC due to bleeding risk per cardiology, who is currently admitted for altered mentation. Concern for UTI, but final urine culture is negative. Several repeated failed attempts for head MRI as patient could not tolerate laying flat, got more agitated, and was screaming. Psychiatry was consulted for agitation.     Patient seen and evaluated at bedside 1/16. Noted to be slightly tachycardic with elevated BP, worsening BUN and creatinine. Patient seen in dialysis unit. Off sedation. Patient is lethargic and mumbles when spoken to. Asked \"is it going to rain\" and \"when is it going to rain\" several times between incoherent mumbling. According to nursing, she was only responsive to painful stimuli and was unable to be given oral medications this morning. Neurology recommended transfer to [facility] for neurosurgery consultation.      Patient seen and evaluated at bedside 1/15. Noted vital signs within normal limits except for elevated BP. Patient is obtunded and difficult to arouse, leaning forward with her chin touching her chest. Responds to loud verbal stimuli and firm tapping. Mumbles softly and incoherently. Nursing reports she is refusing to eat and is refusing oral medications, although took about half of her oral medications before spitting the rest out. Currently off Precedex drip. Nursing states family has not been in to see her today.     Patient seen and evaluated at bedside 1/12. Noted vital signs within normal limits, elevated BP. On      Sleep:       [] Normal/Unchanged  [] Fair       [] Poor              Energy:    [] Normal/Unchanged  [] Increased  [x] Decreased        Aggression:  [] yes  [x] no    Patient is [] able  [x] unable to CONTRACT FOR SAFETY ON THE UNIT    PAST MEDICAL/PSYCHIATRIC HISTORY:   Past Medical History:   Diagnosis Date    Acute CVA (cerebrovascular accident) (Formerly [facility]) 07/25/2020    Acute kidney injury superimposed on CKD (Formerly [facility]) 06/08/2022    Acute on chronic combined systolic (congestive) and diastolic (congestive) heart failure (Formerly [facility]) 02/06/2022    Acute respiratory failure with hypoxia and hypercapnia (Formerly [facility])     JOY (acute kidney injury) (Formerly [facility]) 01/15/2022    Arthritis     Asthma     Atrial fibrillation, unspecified type (Formerly [facility])     Bilateral lower extremity edema 04/20/2022    Bilateral pleural effusion 11/20/2023    Bradycardia 06/12/2022    Chronic diastolic congestive heart failure (Formerly [facility]) 02/20/2020    Chronic ulcer of left leg with fat layer exposed (Formerly [facility]) 06/21/2022    CKD stage 4 secondary to hypertension (Formerly [facility]) 02/20/2020    Dependence on renal dialysis (Formerly [facility])     John Norris, Sat    ESRD (end stage renal disease) (Formerly [facility])     Essential hypertension 07/25/2020    Gastrointestinal hemorrhage 07/20/2022    Gout     Hallucinations 02/18/2021    Hard of hearing     Head contusion 09/09/2020    Hyperkalemia 06/28/2022    Iron deficiency anemia, unspecified     Leg ulcer, left, with fat layer exposed (Formerly [facility]) 04/20/2022    Lymphedema 05/11/2022    Meningioma (Formerly [facility]) 02/25/2021    Meningioma, cerebral (Formerly [facility]) 07/26/2020    Mild intermittent asthma, uncomplicated 07/18/2022    Myocardial infarction (Formerly [facility])     Nephrotic range proteinuria     NSTEMI (non-ST elevated myocardial infarction) (Formerly [facility]) 07/22/2023    Obesity (BMI 30-39.9) 06/14/2022    Old myocardial infarction 07/18/2022    Paroxysmal atrial fibrillation (Formerly [facility]) 07/28/2020    Pressure injury of buttock, stage 2 (Formerly [facility]) 11/25/2022    Pressure injury of sacral region, stage 2 (Formerly [facility]) 11/11/2022     dexmedeTOMIDine (PRECEDEX) 400 mcg in sodium chloride 0.9 % 100 mL infusion, 0.1-1.5 mcg/kg/hr, IntraVENous, Continuous, Ludwig Daniel MD, Stopped at 01/14/24 0738      Examination:  /75   Pulse (!) 102   Temp 97.6 °F (36.4 °C) (Axillary)   Resp 17   Ht 1.524 m (5')   Wt 65.1 kg (143 lb 8.3 oz)   SpO2 97%   BMI 28.03 kg/m²     Mental Status Examination:    Level of consciousness:  lethargic   Appearance:  poor grooming and poor hygiene  Behavior/Motor:  hospital attire, sitting up in bed, responds to examiner with mumbles and asking \"is it going to rain\".   Attitude toward examiner:  lethargic  Speech:  mumbled, mostly incoherent   Mood: lethargic   Affect:  lethargic  Thought processes:  incoherent   Thought content:  incoherent   Suicidal Ideation:  lethargic, incoherent   Delusions:  lethargic, asking if it is going to rain  Perceptual Disturbance:  lethargic   Cognition:  disoriented   Concentration  distracted   Insight poor   Judgement poor     ASSESSMENT:   Patient symptoms are:  [] Well controlled  [] Improving  [] Worsening  [x] No change      Diagnosis:   Principal Problem:    Altered mental status  Active Problems:    Permanent atrial fibrillation (HCC)    Ischemic cardiomyopathy    Encephalopathy acute    History of end stage renal disease    Acute encephalopathy    Vasogenic cerebral edema (HCC)    Mass of temporoparietal region of brain  Resolved Problems:    * No resolved hospital problems. *    Dementia   UTI, ESRD on dialysis (last today)   Delirium      LABS:    No results for input(s): \"WBC\", \"HGB\", \"PLT\" in the last 72 hours.  Recent Labs     01/16/24  0837      K 4.5      CO2 20   BUN 48*   CREATININE 3.8*   GLUCOSE 193*     No results for input(s): \"BILITOT\", \"ALKPHOS\", \"AST\", \"ALT\" in the last 72 hours.  No results found for: \"LABAMPH\", \"BARBSCNU\", \"LABBENZ\", \"CANNAB\", \"COCAINESCRN\", \"LABMETH\", \"OPIATESCREENURINE\", \"PHENCYCLIDINESCREENURINE\", \"PPXUR\", \"ETOH\"  Lab

## 2024-01-16 NOTE — PROGRESS NOTES
NEPHROLOGY PROGRESS NOTE    Patient :  Lupe Ojeda; 73 y.o. MRN# [id number]  Location:  2092/2092-01  Attending:  Ludwig Daniel MD  Admit Date:  1/8/2024   Hospital Day: 8    Reason for consultation: Management of hemodialysis dependent end-stage renal disease.    Consulting physician: Ludwig Daniel MD.    Chief complaint at presentation: Altered mental status.    Interval history:  patient was seen and examined today and she is very stuporous and difficult to arouse.  She is not in obvious distress however.    History of Present Illness:  This is a 73 y.o. female with a significant past medical history of Cerebrovascular accident, atrial fibrillation status post CABG and recent PCI to left circumflex November 2023,  combined diastolic and systolic heart failure, essential hypertension, partial deafness and  ESRD on hemodialysis Tuesday Thursday Saturday at [facility] dialysis unit under my care.Patient had a recent Hospitalization for UTI/PNA and was discharged on 1/6/24.  Patient came back again to the hospital on 1/8/24 with c/o altered mental status, restlessness, confusion.  UA moderate Hb, small L.E;  CT scan of the head showed no acute abnormality.    Current Medications:    sodium bicarbonate tablet 650 mg, TID  metoprolol succinate (TOPROL XL) extended release tablet 50 mg, BID  dilTIAZem (CARDIZEM) tablet 60 mg, 4 times per day  dexAMETHasone (DECADRON) injection 2 mg, Q8H  risperiDONE (RISPERDAL M-TABS) disintegrating tablet 1 mg, BID  hydrALAZINE (APRESOLINE) injection 10 mg, Q6H PRN  lacosamide (VIMPAT) 100 mg in sodium chloride 0.9 % 60 mL IVPB, BID  albuterol (PROVENTIL) (2.5 MG/3ML) 0.083% nebulizer solution 2.5 mg, Q6H PRN  albuterol sulfate HFA (PROVENTIL;VENTOLIN;PROAIR) 108 (90 Base) MCG/ACT inhaler 2 puff, Q6H PRN  amiodarone (CORDARONE) tablet 100 mg, QAM  aspirin EC tablet 81 mg, Daily  atorvastatin (LIPITOR) tablet 40 mg, QPM  clopidogrel (PLAVIX) tablet 75 mg, Daily  famotidine (PEPCID)  (Last 3 readings):   Weight   01/16/24 1351 63.1 kg (139 lb 1.8 oz)   01/16/24 0040 65.1 kg (143 lb 8.3 oz)   01/15/24 0600 64 kg (141 lb 1.5 oz)     Physical Exam:  GENERAL APPEARANCE: sleeping comfortably  HEAD: normocephalic  EYES:  EOMI. Not pale, anicteric   NOSE:  No nasal discharge.    CARDIAC: Normal S1 and S2. No S3, S4 or murmurs. Rhythm is regular.  LUNGS:  diminished breath sounds. No accessory muscle use  ABDOMEN:   Generally soft with normal bowel sounds.  EXTREMITIES: No edema. Peripheral pulses intact.   NEURO: Non focal    Labs:   CBC:  No results for input(s): \"WBC\", \"RBC\", \"HGB\", \"HCT\", \"MCV\", \"MCH\", \"MCHC\", \"RDW\", \"PLT\", \"MPV\" in the last 72 hours.     BMP:   Recent Labs     01/16/24  0837      K 4.5      CO2 20   BUN 48*   CREATININE 3.8*   GLUCOSE 193*   CALCIUM 9.0        Albumin:   Recent Labs     01/16/24  0837   LABALBU 3.4*       Assessment/plan:    1.  End-stage renal disease - secondary to diabetic nephropathy.  We will maintain TTS hemodialysis schedule using right internal jugular tunneled hemodialysis catheter.   Renal diet,i.e 2-gram sodium,2-gram potassium,1500 ml fluid restriction,1-gram phosphorus, 1800 KCal and 1.2 gram/kg protein per day.    2.  Altered mental status - Brain MRI [1/11/2024] showed stable right-sided anterior middle cranial fossa meningioma. Urine culture was negative on 1/9/2024    3.  Systemic hypertension - blood pressure control is adequate.    4.  Anion gap metabolic acidosis - secondary to uremia.  Will supplement bicarbonate.    5.  Mineral bone disease profile - serum phosphorus level  5.7 mg/dL. Will start sevelamer 800 mg p.o. 3 times daily with meals    6.  Coronary artery disease - s/p CABG.    Prognosis is guarded    Electronically signed by Bela Martinez MD on 1/16/2024 at 2:22 PM

## 2024-01-16 NOTE — PLAN OF CARE
Problem: Discharge Planning  Goal: Discharge to home or other facility with appropriate resources  1/16/2024 0557 by April Morton RN  Outcome: Progressing    Problem: Skin/Tissue Integrity  Goal: Absence of new skin breakdown  Description: 1.  Monitor for areas of redness and/or skin breakdown  2.  Assess vascular access sites hourly  3.  Every 4-6 hours minimum:  Change oxygen saturation probe site  4.  Every 4-6 hours:  If on nasal continuous positive airway pressure, respiratory therapy assess nares and determine need for appliance change or resting period.  1/16/2024 0557 by April Morton RN  Outcome: Progressing       Problem: Safety - Adult  Goal: Free from fall injury  1/16/2024 0557 by April Morton RN  Outcome: Progressing       Problem: Confusion  Goal: Confusion, delirium, dementia, or psychosis is improved or at baseline  Description: INTERVENTIONS:  1. Assess for possible contributors to thought disturbance, including medications, impaired vision or hearing, underlying metabolic abnormalities, dehydration, psychiatric diagnoses, and notify attending LIP  2. [place] high risk fall precautions, as indicated  3. Provide frequent short contacts to provide reality reorientation, refocusing and direction  4. Decrease environmental stimuli, including noise as appropriate  5. Monitor and intervene to maintain adequate nutrition, hydration, elimination, sleep and activity  6. If unable to ensure safety without constant attention obtain sitter and review sitter guidelines with assigned personnel  7. Initiate Psychosocial CNS and Spiritual Care consult, as indicated  1/16/2024 0557 by April Morton RN  Outcome: Progressing    Problem: Risk for Elopement  Goal: Patient will not exit the unit/facility without proper excort  1/16/2024 0557 by April Morton RN  Outcome: Progressing       Problem: Pain  Goal: Verbalizes/displays adequate comfort level or baseline comfort level  1/16/2024 0557

## 2024-01-16 NOTE — PROGRESS NOTES
[facility]   OCCUPATIONAL THERAPY MISSED TREATMENT NOTE   INPATIENT   Date: 24  Patient Name: Lupe Ojeda       Room:   MRN: [id number]   Account #: [id number]    : 1950  (73 y.o.)  Gender: female                 REASON FOR MISSED TREATMENT:  24    -    Pt is awaiting transfer to [facility]. Per chart, pt only responding to painful stimuli at this time and is not appropriate for OT eval.      1110       Electronically signed by HELENE Babcock on 24 at 11:09 AM EST

## 2024-01-16 NOTE — PROGRESS NOTES
HEMODIALYSIS POST TREATMENT NOTE    Treatment time ordered: 180    Actual treatment time: 180    UltraFiltration Goal: 2500  UltraFiltration Removed: 2500      Pre Treatment weight: 65.1kg  Post Treatment weight: 63.1kg  Estimated Dry Weight:     Access used:     Central Venous Catheter: right chest tunneled cath     Internal Access: right forearm fistula--not using       Access Flow: good     Sign and symptoms of infection: no       If YES: n/a    Medications or blood products given: none    Regular outpatient schedule: TTS    Summary of response to treatment: Patient tolerated treatment well, no complications    Explain if orders NOT met, was physician notified:n/a      ACES flowsheet faxed to patient unit/ placed in patient chart: will do when finished    Post assessment completed: yes    Report given to: Patti Faulkner      * Intra-treatment documented Safety Checks include the followin) Access and face visible at all times.     2) All connections and blood lines are secure with no kinks.     3) NVL alarm engaged.     4) Hemosafe device applied (if applicable).     5) No collapse of Arterial or Venous blood chambers.     6) All blood lines / pump segments in the air detectors.

## 2024-01-16 NOTE — PROGRESS NOTES
[facility]   IN-PATIENT SERVICE   [facility]    Progress note             Date:   1/16/2024  Patient name:  Lupe Ojeda  Date of admission:  1/8/2024  8:24 PM  MRN:   [id number]  Account:  [id number]  YOB: 1950  PCP:    Kayleigh Cardoso MD  Room:   2092/2092-01  Code Status:    Full Code    Chief Complaint:     Chief Complaint   Patient presents with    Altered Mental Status     Worsening today        History Obtained From:     patient, electronic medical record    History of Present Illness:     The patient is a 73 y.o.  Non- / non  female who presents with Altered Mental Status (Worsening today )   and she is admitted to the hospital for the management of altered mental status  Patient has past medical history of multiple medical problem which include coronary artery disease s/p CABG, heart failure with reduced ejection fraction, history of stroke, history of hematuria, history of A-fib not on anticoagulation due to bleeding risk, COPD, ESRD on hemodialysis, bilateral cataract, hearing disability  Patient was recently discharged from the hospital only few days ago when she was admitted with altered mental status, underwent CT head suggestive of meningioma with mass effect, patient also underwent EEG suggestive of sharp waves concerning for possible seizures, started on antiepileptics  Patient family did not want to get her discharged to SNF  Patient eventually discharged home  Patient presented again with increasing confusion, as per daughter at bedside, patient was talking gibberish,  She was started on Seroquel which was discontinued outpatient due to side effect  Denying complaints of chest pain, shortness of breath  Patient is compliant with her diet medication  Last admission was planned to get MRI done which unfortunately could not be done because of patient agitation    Worsening confusion transferred to ICU for Precedex drip        Past Medical  History:     Past Medical History:   Diagnosis Date    Acute CVA (cerebrovascular accident) (Formerly [facility]) 07/25/2020    Acute kidney injury superimposed on CKD (Formerly [facility]) 06/08/2022    Acute on chronic combined systolic (congestive) and diastolic (congestive) heart failure (Formerly [facility]) 02/06/2022    Acute respiratory failure with hypoxia and hypercapnia (Formerly [facility])     JOY (acute kidney injury) (Formerly [facility]) 01/15/2022    Arthritis     Asthma     Atrial fibrillation, unspecified type (Formerly [facility])     Bilateral lower extremity edema 04/20/2022    Bilateral pleural effusion 11/20/2023    Bradycardia 06/12/2022    Chronic diastolic congestive heart failure (HCC) 02/20/2020    Chronic ulcer of left leg with fat layer exposed (Formerly [facility]) 06/21/2022    CKD stage 4 secondary to hypertension (Formerly [facility]) 02/20/2020    Dependence on renal dialysis (Formerly [facility])     Tues, Th, Sat    ESRD (end stage renal disease) (Formerly [facility])     Essential hypertension 07/25/2020    Gastrointestinal hemorrhage 07/20/2022    Gout     Hallucinations 02/18/2021    Hard of hearing     Head contusion 09/09/2020    Hyperkalemia 06/28/2022    Iron deficiency anemia, unspecified     Leg ulcer, left, with fat layer exposed ([facility]) 04/20/2022    Lymphedema 05/11/2022    Meningioma (Formerly [facility]) 02/25/2021    Meningioma, cerebral (Formerly [facility]) 07/26/2020    Mild intermittent asthma, uncomplicated 07/18/2022    Myocardial infarction (Formerly [facility])     Nephrotic range proteinuria     NSTEMI (non-ST elevated myocardial infarction) (Formerly [facility]) 07/22/2023    Obesity (BMI 30-39.9) 06/14/2022    Old myocardial infarction 07/18/2022    Paroxysmal atrial fibrillation (Formerly [facility]) 07/28/2020    Pressure injury of buttock, stage 2 (Formerly [facility]) 11/25/2022    Pressure injury of sacral region, stage 2 (Formerly [facility]) 11/11/2022    Pulmonary edema cardiac cause (Formerly [facility])     Pure hypercholesterolemia     Sepsis ([facility]ly [facility]) 07/12/2022    Severe malnutrition ([facility]ly [facility]) 07/22/2022    Shock (Formerly [facility])     Stage 4 chronic kidney disease (Formerly [facility])     Symptomatic anemia     Toxic metabolic encephalopathy 07/26/2020     pulm critical care for their input            Consultations:   Patient is admitted as inpatient status because of co-morbidities listed above, severity of signs and symptoms as outlined, requirement for current medical therapies and most importantly because of direct risk to patient if care not provided in a hospital setting.    Jackelyn Eugene MD  1/16/2024  5:59 PM    Copy sent to Kayleigh Pittman MD    Please note that this chart was generated using voice recognition Dragon dictation software.  Although every effort was made to ensure the accuracy of this automated transcription, some errors in transcription may have occurred.

## 2024-01-16 NOTE — PLAN OF CARE
Problem: Discharge Planning  Goal: Discharge to home or other facility with appropriate resources  Outcome: Progressing     Problem: Skin/Tissue Integrity  Goal: Absence of new skin breakdown  Description: 1.  Monitor for areas of redness and/or skin breakdown  2.  Assess vascular access sites hourly  3.  Every 4-6 hours minimum:  Change oxygen saturation probe site  4.  Every 4-6 hours:  If on nasal continuous positive airway pressure, respiratory therapy assess nares and determine need for appliance change or resting period.  Outcome: Progressing     Problem: Safety - Adult  Goal: Free from fall injury  Outcome: Progressing     Problem: Confusion  Goal: Confusion, delirium, dementia, or psychosis is improved or at baseline  Description: INTERVENTIONS:  1. Assess for possible contributors to thought disturbance, including medications, impaired vision or hearing, underlying metabolic abnormalities, dehydration, psychiatric diagnoses, and notify attending LIP  2. [place] high risk fall precautions, as indicated  3. Provide frequent short contacts to provide reality reorientation, refocusing and direction  4. Decrease environmental stimuli, including noise as appropriate  5. Monitor and intervene to maintain adequate nutrition, hydration, elimination, sleep and activity  6. If unable to ensure safety without constant attention obtain sitter and review sitter guidelines with assigned personnel  7. Initiate Psychosocial CNS and Spiritual Care consult, as indicated  Outcome: Progressing     Problem: Risk for Elopement  Goal: Patient will not exit the unit/facility without proper excort  Outcome: Progressing     Problem: Pain  Goal: Verbalizes/displays adequate comfort level or baseline comfort level  Outcome: Progressing     Problem: Chronic Conditions and Co-morbidities  Goal: Patient's chronic conditions and co-morbidity symptoms are monitored and maintained or improved  Outcome: Progressing     Problem: ABCDS  Injury Assessment  Goal: Absence of physical injury  Outcome: Progressing     Problem: Nutrition Deficit:  Goal: Optimize nutritional status  Outcome: Progressing

## 2024-01-16 NOTE — PROGRESS NOTES
Physical Therapy          Physical Therapy Cancel Note      DATE: 2024    NAME: Lupe Ojeda  MRN: [id number]   : 1950      Patient not seen this date for Physical Therapy due to:    Awaiting transfer to [facility], not appropriate for physical therapy at this time.      Electronically signed by Gus Gold PT on 2024 at 11:07 AM

## 2024-01-16 NOTE — PROGRESS NOTES
HEMODIALYSIS PRE-TREATMENT NOTE    Patient Identifiers prior to treatment: mrn name     Isolation Required: yes                 Isolation Type: contact, history mrsa       (please document if patient is being managed as a PUI/COVID-19 patient)        Hepatitis status:                           Date Drawn                             Result  Hepatitis B Surface Antigen 24     neg                     Hepatitis B Surface Antibody 22 3.5        Hepatitis B Core Antibody 24 neg          How was Hepatitis Status verified: epic     Was a copy of the labs you documented provided to facility for the patient's chart: yes    Hemodialysis orders verified: yes    Access Within normal limits ( I.e. s/s of infection,...): yes     Pre-Assessment completed: yes    Pre-dialysis report received from: Patti Faulkner                 Time: 1038

## 2024-01-16 NOTE — PLAN OF CARE
Problem: Discharge Planning  Goal: Discharge to home or other facility with appropriate resources  1/16/2024 1505 by Patti Donahue RN  Outcome: Progressing     Problem: Skin/Tissue Integrity  Goal: Absence of new skin breakdown  Description: 1.  Monitor for areas of redness and/or skin breakdown  2.  Assess vascular access sites hourly  3.  Every 4-6 hours minimum:  Change oxygen saturation probe site  4.  Every 4-6 hours:  If on nasal continuous positive airway pressure, respiratory therapy assess nares and determine need for appliance change or resting period.  1/16/2024 1505 by Patti Donahue RN  Outcome: Progressing     Problem: Safety - Adult  Goal: Free from fall injury  1/16/2024 1505 by Patti Donahue RN  Outcome: Progressing     Problem: Confusion  Goal: Confusion, delirium, dementia, or psychosis is improved or at baseline  Description: INTERVENTIONS:  1. Assess for possible contributors to thought disturbance, including medications, impaired vision or hearing, underlying metabolic abnormalities, dehydration, psychiatric diagnoses, and notify attending LIP  2. Friona high risk fall precautions, as indicated  3. Provide frequent short contacts to provide reality reorientation, refocusing and direction  4. Decrease environmental stimuli, including noise as appropriate  5. Monitor and intervene to maintain adequate nutrition, hydration, elimination, sleep and activity  6. If unable to ensure safety without constant attention obtain sitter and review sitter guidelines with assigned personnel  7. Initiate Psychosocial CNS and Spiritual Care consult, as indicated  1/16/2024 1505 by Patti Donahue RN  Outcome: Progressing     Problem: Risk for Elopement  Goal: Patient will not exit the unit/facility without proper excort  1/16/2024 1505 by Patti Donahue RN  Outcome: Progressing     Problem: Pain  Goal: Verbalizes/displays adequate comfort level or baseline comfort level  1/16/2024 1505 by Patti Donahue

## 2024-01-16 NOTE — PROGRESS NOTES
[facility] PULMONARY,CRITICAL CARE & SLEEP   Dewey Denis MD/Rahul Noel MD/ Chuck Spain MD/Dr Anisha Cisneros APRN AGAP-BC, NP-C      Milly Minaya APRN NP-C     Romana Andino APRN NP-C                                           Pulmonary Progress Note    Patient - Lupe Ojeda   Age - 73 y.o.   - 1950  MRN - [id number]  [facility] - [id number]  Date of Admission - 2024  8:24 PM    Consulting Service/Physician:       Primary Care Physician: Kayleigh Cardoso MD    SUBJECTIVE:     Chief Complaint:   Chief Complaint   Patient presents with    Altered Mental Status     Worsening today      Subjective:    Lupe is seen lying in bed on room air.  She continues to be pretty obtunded.  She is pending a bed at [facility].  She continues on dexamethasone every 8 hours.  She has been afebrile.  ABG was done yesterday due to obtunded state with no evidence of hypercapnia, pH 7.38, CO2 34, O2 84.      VITALS  BP (!) 150/82   Pulse 100   Temp 97.6 °F (36.4 °C) (Axillary)   Resp 17   Ht 1.524 m (5')   Wt 65.1 kg (143 lb 8.3 oz)   SpO2 97%   BMI 28.03 kg/m²   Wt Readings from Last 3 Encounters:   24 65.1 kg (143 lb 8.3 oz)   24 66 kg (145 lb 8.1 oz)   23 56.7 kg (125 lb)     I/O (24 Hours)    Intake/Output Summary (Last 24 hours) at 2024 0811  Last data filed at 1/15/2024 0833  Gross per 24 hour   Intake 20 ml   Output --   Net 20 ml     Ventilator:      Exam:   Physical Exam   Constitutional: Obtunded elderly lady laying in bed on room air  HENT: Unremarkable  Head: Normocephalic and atraumatic.   Eyes: EOM are normal. Pupils are equal, round, and reactive to light.   Neck: Neck supple.   Cardiovascular:  Regular rate and rhythm.  Normal heart tones.  No JVD.    Pulmonary/Chest: Respirations even and nonlabored, clear anteriorly, on room air with pulse ox 97%  Abdominal: Soft. Bowel sounds are normal.  Musculoskeletal: Normal  (ZOFRAN-ODT) disintegrating tablet 4 mg, 4 mg, Oral, Q8H PRN **OR** ondansetron (ZOFRAN) injection 4 mg, 4 mg, IntraVENous, Q6H PRN, Kate Collier APRN - NP, 4 mg at 01/11/24 1647    acetaminophen (TYLENOL) tablet 650 mg, 650 mg, Oral, Q6H PRN **OR** acetaminophen (TYLENOL) suppository 650 mg, 650 mg, Rectal, Q6H PRN, Kate Collier APRAARON - NP    0.9 % sodium chloride infusion, , IntraVENous, Continuous, Fan Novak MD, Last Rate: 35 mL/hr at 01/14/24 0626, Rate Verify at 01/14/24 0626    cetirizine (ZYRTEC) tablet 5 mg, 5 mg, Oral, Daily, Ludwig Daniel MD, 5 mg at 01/15/24 0852    anticoagulant sodium citrate 4 % injection 1.9 mL, 1.9 mL, IntraCATHeter, PRN, Fan Novak MD    anticoagulant sodium citrate 4 % injection 1.9 mL, 1.9 mL, IntraCATHeter, PRN, Fan Novak MD    dexmedeTOMIDine (PRECEDEX) 400 mcg in sodium chloride 0.9 % 100 mL infusion, 0.1-1.5 mcg/kg/hr, IntraVENous, Continuous, Ludwig Daniel MD, Stopped at 01/14/24 0738    Lab Results:     Lab Results   Component Value Date    WBC 6.5 01/13/2024    HGB 12.5 01/13/2024    HCT 39.3 01/13/2024    MCV 93.3 01/13/2024     (L) 01/13/2024     Lab Results   Component Value Date    CALCIUM 8.2 (L) 01/13/2024     01/13/2024    K 4.5 01/13/2024    CO2 19 (L) 01/13/2024     01/13/2024    BUN 36 (H) 01/13/2024    CREATININE 3.4 (H) 01/13/2024       Lab Results   Component Value Date    INR 1.0 12/31/2023    PROTIME 13.6 12/31/2023       Radiology:       ASSESSMENT:       Encephalopathy-mixture of cerebral edema and delirium  Right anterior middle cranial fossa meningioma with some surrounding vasogenic edema and mass effect, noted on imaging August 2023 and now  End-stage renal disease hemodialysis dependent  History of CABG 2003 at [facility]  Suspect restrictive ventilatory defect secondary to severe kyphoscoliosis  COPD?? Symbicort/Spiriva, smoked for 30+ years quitting altogether in

## 2024-01-16 NOTE — CARE COORDINATION
ONGOING DISCHARGE PLAN:    Writer reviewed Chart Notes.    Plan remains for Pt. To transfer to [facility] for Neurosurgery.     Pt. Has been accepted. As of this writing, we are still waiting on a bed.     Remains on IV Decadron, Q 8 hours.     Neurology on board.     Will continue to follow for additional discharge needs.    If patient is discharged prior to next notation, then this note serves as note for discharge by case management.    Electronically signed by Gina Mederos RN on 1/16/2024 at 10:43 AM

## 2024-01-16 NOTE — PROGRESS NOTES
Patient is only responsive to painful stimuli. Notified attending physician . Unable to give oral medications this morning

## 2024-01-17 PROBLEM — R44.3 HALLUCINATIONS: Status: ACTIVE | Noted: 2024-01-17

## 2024-01-17 PROBLEM — Z51.5 ENCOUNTER FOR PALLIATIVE CARE: Status: ACTIVE | Noted: 2024-01-17

## 2024-01-17 PROBLEM — R41.89 COGNITIVE IMPAIRMENT: Status: ACTIVE | Noted: 2024-01-17

## 2024-01-17 PROBLEM — F05 DELIRIUM DUE TO MULTIPLE ETIOLOGIES: Status: ACTIVE | Noted: 2024-01-17

## 2024-01-17 LAB
AMMONIA PLAS-SCNC: 32 UMOL/L (ref 11–51)
ANION GAP SERPL CALCULATED.3IONS-SCNC: 11 MMOL/L (ref 9–17)
BASOPHILS # BLD: 0 K/UL (ref 0–0.2)
BASOPHILS NFR BLD: 0 % (ref 0–2)
BUN SERPL-MCNC: 26 MG/DL (ref 8–23)
CALCIUM SERPL-MCNC: 9.2 MG/DL (ref 8.6–10.4)
CHLORIDE SERPL-SCNC: 106 MMOL/L (ref 98–107)
CO2 SERPL-SCNC: 26 MMOL/L (ref 20–31)
CREAT SERPL-MCNC: 2.7 MG/DL (ref 0.5–0.9)
EOSINOPHIL # BLD: 0 K/UL (ref 0–0.4)
EOSINOPHILS RELATIVE PERCENT: 0 % (ref 1–4)
ERYTHROCYTE [DISTWIDTH] IN BLOOD BY AUTOMATED COUNT: 17.4 % (ref 11.8–14.4)
GFR SERPL CREATININE-BSD FRML MDRD: 18 ML/MIN/1.73M2
GLUCOSE BLD-MCNC: 246 MG/DL (ref 65–105)
GLUCOSE SERPL-MCNC: 240 MG/DL (ref 70–99)
HCT VFR BLD AUTO: 43.3 % (ref 36.3–47.1)
HGB BLD-MCNC: 13.6 G/DL (ref 11.9–15.1)
IMM GRANULOCYTES # BLD AUTO: 0.44 K/UL (ref 0–0.3)
IMM GRANULOCYTES NFR BLD: 5 %
LACTIC ACID, WHOLE BLOOD: 3.1 MMOL/L (ref 0.7–2.1)
LYMPHOCYTES NFR BLD: 0.44 K/UL (ref 1–4.8)
LYMPHOCYTES RELATIVE PERCENT: 5 % (ref 24–44)
MCH RBC QN AUTO: 29.9 PG (ref 25.2–33.5)
MCHC RBC AUTO-ENTMCNC: 31.4 G/DL (ref 28.4–34.8)
MCV RBC AUTO: 95.2 FL (ref 82.6–102.9)
MONOCYTES NFR BLD: 0.53 K/UL (ref 0.1–0.8)
MONOCYTES NFR BLD: 6 % (ref 1–7)
MORPHOLOGY: ABNORMAL
NEUTROPHILS NFR BLD: 84 % (ref 36–66)
NEUTS SEG NFR BLD: 7.39 K/UL (ref 1.8–7.7)
NRBC BLD-RTO: 0.2 PER 100 WBC
PLATELET # BLD AUTO: 147 K/UL (ref 138–453)
PMV BLD AUTO: 10.5 FL (ref 8.1–13.5)
POTASSIUM SERPL-SCNC: 5.2 MMOL/L (ref 3.7–5.3)
PROCALCITONIN SERPL-MCNC: 0.17 NG/ML
RBC # BLD AUTO: 4.55 M/UL (ref 3.95–5.11)
SODIUM SERPL-SCNC: 143 MMOL/L (ref 135–144)
TSH SERPL DL<=0.05 MIU/L-ACNC: 1.11 UIU/ML (ref 0.3–5)
WBC OTHER # BLD: 8.8 K/UL (ref 3.5–11.3)

## 2024-01-17 PROCEDURE — APPSS30 APP SPLIT SHARED TIME 16-30 MINUTES: Performed by: PHYSICIAN ASSISTANT

## 2024-01-17 PROCEDURE — 84145 PROCALCITONIN (PCT): CPT

## 2024-01-17 PROCEDURE — 6360000002 HC RX W HCPCS: Performed by: INTERNAL MEDICINE

## 2024-01-17 PROCEDURE — 82140 ASSAY OF AMMONIA: CPT

## 2024-01-17 PROCEDURE — 84443 ASSAY THYROID STIM HORMONE: CPT

## 2024-01-17 PROCEDURE — 99222 1ST HOSP IP/OBS MODERATE 55: CPT | Performed by: INTERNAL MEDICINE

## 2024-01-17 PROCEDURE — 2000000000 HC ICU R&B

## 2024-01-17 PROCEDURE — C9254 INJECTION, LACOSAMIDE: HCPCS | Performed by: INTERNAL MEDICINE

## 2024-01-17 PROCEDURE — 99223 1ST HOSP IP/OBS HIGH 75: CPT

## 2024-01-17 PROCEDURE — 2500000003 HC RX 250 WO HCPCS: Performed by: INTERNAL MEDICINE

## 2024-01-17 PROCEDURE — 94640 AIRWAY INHALATION TREATMENT: CPT

## 2024-01-17 PROCEDURE — 95720 EEG PHY/QHP EA INCR W/VEEG: CPT | Performed by: PSYCHIATRY & NEUROLOGY

## 2024-01-17 PROCEDURE — 97535 SELF CARE MNGMENT TRAINING: CPT

## 2024-01-17 PROCEDURE — 97167 OT EVAL HIGH COMPLEX 60 MIN: CPT

## 2024-01-17 PROCEDURE — 99213 OFFICE O/P EST LOW 20 MIN: CPT

## 2024-01-17 PROCEDURE — 2060000000 HC ICU INTERMEDIATE R&B

## 2024-01-17 PROCEDURE — 2500000003 HC RX 250 WO HCPCS

## 2024-01-17 PROCEDURE — 6370000000 HC RX 637 (ALT 250 FOR IP): Performed by: INTERNAL MEDICINE

## 2024-01-17 PROCEDURE — 83605 ASSAY OF LACTIC ACID: CPT

## 2024-01-17 PROCEDURE — 99223 1ST HOSP IP/OBS HIGH 75: CPT | Performed by: PSYCHIATRY & NEUROLOGY

## 2024-01-17 PROCEDURE — 36415 COLL VENOUS BLD VENIPUNCTURE: CPT

## 2024-01-17 PROCEDURE — 95711 VEEG 2-12 HR UNMONITORED: CPT

## 2024-01-17 PROCEDURE — 80048 BASIC METABOLIC PNL TOTAL CA: CPT

## 2024-01-17 PROCEDURE — 2580000003 HC RX 258: Performed by: INTERNAL MEDICINE

## 2024-01-17 PROCEDURE — 85025 COMPLETE CBC W/AUTO DIFF WBC: CPT

## 2024-01-17 PROCEDURE — 94761 N-INVAS EAR/PLS OXIMETRY MLT: CPT

## 2024-01-17 PROCEDURE — 95700 EEG CONT REC W/VID EEG TECH: CPT

## 2024-01-17 PROCEDURE — 94760 N-INVAS EAR/PLS OXIMETRY 1: CPT

## 2024-01-17 RX ORDER — LACOSAMIDE 10 MG/ML
100 INJECTION, SOLUTION INTRAVENOUS 2 TIMES DAILY
Status: DISCONTINUED | OUTPATIENT
Start: 2024-01-17 | End: 2024-01-21

## 2024-01-17 RX ORDER — METOPROLOL SUCCINATE 25 MG/1
100 TABLET, EXTENDED RELEASE ORAL DAILY
Status: DISCONTINUED | OUTPATIENT
Start: 2024-01-18 | End: 2024-01-23 | Stop reason: HOSPADM

## 2024-01-17 RX ORDER — DILTIAZEM HYDROCHLORIDE 5 MG/ML
10 INJECTION INTRAVENOUS 4 TIMES DAILY
Status: DISCONTINUED | OUTPATIENT
Start: 2024-01-17 | End: 2024-01-17

## 2024-01-17 RX ADMIN — SODIUM CHLORIDE, PRESERVATIVE FREE 10 ML: 5 INJECTION INTRAVENOUS at 08:54

## 2024-01-17 RX ADMIN — TIOTROPIUM BROMIDE INHALATION SPRAY 2 PUFF: 3.12 SPRAY, METERED RESPIRATORY (INHALATION) at 09:51

## 2024-01-17 RX ADMIN — FUROSEMIDE 80 MG: 40 TABLET ORAL at 08:56

## 2024-01-17 RX ADMIN — ANTI-FUNGAL POWDER MICONAZOLE NITRATE TALC FREE: 1.42 POWDER TOPICAL at 08:53

## 2024-01-17 RX ADMIN — SODIUM BICARBONATE 650 MG: 648 TABLET ORAL at 20:50

## 2024-01-17 RX ADMIN — LACOSAMIDE 100 MG: 10 INJECTION INTRAVENOUS at 20:56

## 2024-01-17 RX ADMIN — LEVOTHYROXINE SODIUM 25 MCG: 50 TABLET ORAL at 09:07

## 2024-01-17 RX ADMIN — AMIODARONE HYDROCHLORIDE 100 MG: 200 TABLET ORAL at 08:53

## 2024-01-17 RX ADMIN — MUPIROCIN: 20 OINTMENT TOPICAL at 08:57

## 2024-01-17 RX ADMIN — CLOPIDOGREL BISULFATE 75 MG: 75 TABLET ORAL at 09:07

## 2024-01-17 RX ADMIN — HEPARIN SODIUM 5000 UNITS: 5000 INJECTION INTRAVENOUS; SUBCUTANEOUS at 21:06

## 2024-01-17 RX ADMIN — BUDESONIDE AND FORMOTEROL FUMARATE DIHYDRATE 2 PUFF: 160; 4.5 AEROSOL RESPIRATORY (INHALATION) at 09:51

## 2024-01-17 RX ADMIN — DEXAMETHASONE SODIUM PHOSPHATE 2 MG: 4 INJECTION, SOLUTION INTRA-ARTICULAR; INTRALESIONAL; INTRAMUSCULAR; INTRAVENOUS; SOFT TISSUE at 20:55

## 2024-01-17 RX ADMIN — FAMOTIDINE 10 MG: 20 TABLET, FILM COATED ORAL at 09:08

## 2024-01-17 RX ADMIN — SODIUM CHLORIDE, PRESERVATIVE FREE 10 ML: 5 INJECTION INTRAVENOUS at 21:05

## 2024-01-17 RX ADMIN — RISPERIDONE 1 MG: 1 TABLET, ORALLY DISINTEGRATING ORAL at 20:49

## 2024-01-17 RX ADMIN — MIRTAZAPINE 15 MG: 15 TABLET, FILM COATED ORAL at 20:49

## 2024-01-17 RX ADMIN — SODIUM BICARBONATE 650 MG: 648 TABLET ORAL at 08:56

## 2024-01-17 RX ADMIN — DEXAMETHASONE SODIUM PHOSPHATE 2 MG: 4 INJECTION, SOLUTION INTRA-ARTICULAR; INTRALESIONAL; INTRAMUSCULAR; INTRAVENOUS; SOFT TISSUE at 05:44

## 2024-01-17 RX ADMIN — ATORVASTATIN CALCIUM 40 MG: 40 TABLET, FILM COATED ORAL at 17:14

## 2024-01-17 RX ADMIN — DILTIAZEM HYDROCHLORIDE 60 MG: 60 TABLET, FILM COATED ORAL at 17:14

## 2024-01-17 RX ADMIN — DILTIAZEM HYDROCHLORIDE 10 MG: 5 INJECTION, SOLUTION INTRAVENOUS at 09:22

## 2024-01-17 RX ADMIN — CETIRIZINE HYDROCHLORIDE 5 MG: 10 TABLET ORAL at 08:56

## 2024-01-17 RX ADMIN — DEXAMETHASONE SODIUM PHOSPHATE 2 MG: 4 INJECTION, SOLUTION INTRA-ARTICULAR; INTRALESIONAL; INTRAMUSCULAR; INTRAVENOUS; SOFT TISSUE at 13:53

## 2024-01-17 RX ADMIN — HEPARIN SODIUM 5000 UNITS: 5000 INJECTION INTRAVENOUS; SUBCUTANEOUS at 09:08

## 2024-01-17 RX ADMIN — BUDESONIDE AND FORMOTEROL FUMARATE DIHYDRATE 2 PUFF: 160; 4.5 AEROSOL RESPIRATORY (INHALATION) at 20:33

## 2024-01-17 RX ADMIN — SODIUM CHLORIDE: 9 INJECTION, SOLUTION INTRAVENOUS at 05:43

## 2024-01-17 RX ADMIN — DILTIAZEM HYDROCHLORIDE 60 MG: 60 TABLET, FILM COATED ORAL at 13:51

## 2024-01-17 RX ADMIN — METOPROLOL SUCCINATE 50 MG: 25 TABLET, EXTENDED RELEASE ORAL at 08:54

## 2024-01-17 RX ADMIN — SODIUM BICARBONATE 650 MG: 648 TABLET ORAL at 14:01

## 2024-01-17 RX ADMIN — RISPERIDONE 1 MG: 1 TABLET, ORALLY DISINTEGRATING ORAL at 08:58

## 2024-01-17 RX ADMIN — HEPARIN SODIUM 5000 UNITS: 5000 INJECTION INTRAVENOUS; SUBCUTANEOUS at 13:51

## 2024-01-17 RX ADMIN — LACOSAMIDE 100 MG: 10 INJECTION INTRAVENOUS at 10:31

## 2024-01-17 RX ADMIN — ASPIRIN 81 MG: 81 TABLET, COATED ORAL at 08:54

## 2024-01-17 RX ADMIN — ANTI-FUNGAL POWDER MICONAZOLE NITRATE TALC FREE: 1.42 POWDER TOPICAL at 21:04

## 2024-01-17 ASSESSMENT — PAIN SCALES - GENERAL
PAINLEVEL_OUTOF10: 0
PAINLEVEL_OUTOF10: 0

## 2024-01-17 NOTE — PROGRESS NOTES
Per Rogelio Judd Nephrology MD, The MRI Brain is to be done without contrast. Only unless absolutely necessary MRI with contrast can be done Perfect serve sent to Neurosurgery and Neurology team.

## 2024-01-17 NOTE — PROGRESS NOTES
2-/5, B elbow fx/ex 3/5, B hands 3+/5 (patient does follow commands to squeeze writers hands))  Coordination: Generally decreased, functional (Unable to formally assess secondary to cognition, pt able to  chips with L hand, however pt does not have enough strength/coordination to hold cup)  Tone: Normal  Sensation: Intact (Unable to formally assess secondary to cognition)      ADL  Feeding: Moderate assistance;Setup  Feeding Skilled Clinical Factors: Pt DEP for bringing drink to mouth and placing straw in mouth, pt able to self drink. Pt unable to reach inside bag for self feeding chips however when placed on towel on patient chest pt able to utilize L  hand and self feed. Pt reports pt cannot visually see chips but reports \"Pt can feel it\". Pt completes ~50% of chips with writer and writer discussing with RN for cont f/t.  Grooming: Setup;Maximum assistance  UE Bathing: Setup;Maximum assistance  LE Bathing: Dependent/Total;Setup  UE Dressing: Maximum assistance;Setup  LE Dressing: Dependent/Total;Setup  LE Dressing Skilled Clinical Factors: don B socks  Toileting: Dependent/Total;Setup  Additional Comments: Secondary to seated balance and varying cognition pt anticipated to require increase assistance with all ADLs              Vision  Vision: Impaired (Unable to formally assess secondary to cognition, however pt is able to self report that pt cannot see items in front of pt, demonstrates \"feeling\" for items verse visual, however pt does recognize verbal cues to look R/L and able to turn self toward verbal cues appropriately, please cont to assess)  Hearing  Hearing: Within functional limits (Unable to formally assess however appears intact)  Cognition  Overall Cognitive Status: Exceptions  Arousal/Alertness: Delayed responses to stimuli  Following Commands: Inconsistently follows commands  Attention Span: Attends with cues to redirect;Difficulty dividing attention  Safety Judgement: Decreased awareness of

## 2024-01-17 NOTE — PLAN OF CARE
Problem: Safety - Adult  Goal: Free from fall injury  Outcome: Progressing     Problem: Chronic Conditions and Co-morbidities  Goal: Patient's chronic conditions and co-morbidity symptoms are monitored and maintained or improved  Outcome: Progressing  Flowsheets (Taken 1/17/2024 0000)  Care Plan - Patient's Chronic Conditions and Co-Morbidity Symptoms are Monitored and Maintained or Improved: Monitor and assess patient's chronic conditions and comorbid symptoms for stability, deterioration, or improvement     Problem: Discharge Planning  Goal: Discharge to home or other facility with appropriate resources  Outcome: Progressing  Flowsheets (Taken 1/17/2024 0000)  Discharge to home or other facility with appropriate resources: Identify barriers to discharge with patient and caregiver

## 2024-01-17 NOTE — PLAN OF CARE
Problem: Safety - Adult  Goal: Free from fall injury  1/17/2024 1207 by Julisa Gamez RN  Outcome: Progressing  1/17/2024 0613 by Toya Castillo RN  Outcome: Progressing     Problem: Chronic Conditions and Co-morbidities  Goal: Patient's chronic conditions and co-morbidity symptoms are monitored and maintained or improved  1/17/2024 1207 by Julisa Gamez RN  Outcome: Progressing  Flowsheets (Taken 1/17/2024 0856)  Care Plan - Patient's Chronic Conditions and Co-Morbidity Symptoms are Monitored and Maintained or Improved: Monitor and assess patient's chronic conditions and comorbid symptoms for stability, deterioration, or improvement  1/17/2024 0613 by Toya Castillo RN  Outcome: Progressing  Flowsheets (Taken 1/17/2024 0000)  Care Plan - Patient's Chronic Conditions and Co-Morbidity Symptoms are Monitored and Maintained or Improved: Monitor and assess patient's chronic conditions and comorbid symptoms for stability, deterioration, or improvement     Problem: Discharge Planning  Goal: Discharge to home or other facility with appropriate resources  1/17/2024 1207 by Julisa Gamez RN  Outcome: Progressing  Flowsheets (Taken 1/17/2024 0856)  Discharge to home or other facility with appropriate resources: Identify barriers to discharge with patient and caregiver  1/17/2024 0613 by Toya Castillo RN  Outcome: Progressing  Flowsheets (Taken 1/17/2024 0000)  Discharge to home or other facility with appropriate resources: Identify barriers to discharge with patient and caregiver     Problem: Skin/Tissue Integrity  Goal: Absence of new skin breakdown  Description: 1.  Monitor for areas of redness and/or skin breakdown  2.  Assess vascular access sites hourly  3.  Every 4-6 hours minimum:  Change oxygen saturation probe site  4.  Every 4-6 hours:  If on nasal continuous positive airway pressure, respiratory therapy assess nares and determine need for appliance change or resting period.  Outcome:

## 2024-01-17 NOTE — PROGRESS NOTES
Patient transferred to [facility] via ambulance. Skin Pk/W/D. Easy respirations. Denies pain. D/c'd with all belongings.

## 2024-01-17 NOTE — CONSULTS
pectoris    Hypertension, essential    Iron deficiency anemia    Colonoscopy refused    Bilateral hearing loss    COPD (chronic obstructive pulmonary disease) (HCC)    Gout with tophi    Dyslipidemia    Metabolic encephalopathy    E. coli UTI    Meningioma (HCC)    Kidney stones    Altered mental status    Delirium due to another medical condition    Diverticulosis    COVID-19 vaccination declined    Chronic venous stasis dermatitis of both lower extremities    Family history of colon cancer    Family history of pancreatic cancer    Mild malnutrition (HCC)    Decreased strength, endurance, and mobility    Lymphedema    PVD (peripheral vascular disease) ([facility])    Elevated troponin    ESRD on dialysis (HCC)    Hypertensive heart and chronic kidney disease with heart failure and with stage 5 chronic kidney disease, or end stage renal disease (HCC)    Gastroesophageal reflux disease    Moderate anxiety    Other age-related cataract    History of GI bleed    Chronic combined systolic and diastolic CHF (congestive heart failure) ([facility])    Degenerative disease of nervous system, unspecified ([facility])    Allergic rhinitis    Acquired hypothyroidism    Type 2 diabetes mellitus with polyneuropathy ([facility])    Chronic respiratory failure with hypoxia (HCC)    Other osteoporosis without current pathological fracture    Moderate vascular dementia with anxiety ([facility])    Recurrent falls while walking    Persistent atrial fibrillation ([facility])    Slow transit constipation    Right temporal lobe mass    History of type 2 diabetes mellitus    History of chronic atrial fibrillation    Encephalopathy acute    Bacteremia    Actinomyces infection    ESRD (end stage renal disease) on dialysis (HCC)    Other seizures    Pressure ulcer of coccygeal region, stage III (HCC)    Bilateral pleural effusion    CAD S/P percutaneous coronary angioplasty    H/O heart artery stent    Abnormal echocardiogram    Mixed hyperlipidemia    Sepsis due to urinary tract  infection (HCC)    Renovascular hypertension    Goals of care, counseling/discussion    DNR (do not resuscitate) discussion    ACP (advance care planning)    Palliative care encounter    Acute cystitis with hematuria    Permanent atrial fibrillation (HCC)    Ischemic cardiomyopathy    History of end stage renal disease    Acute encephalopathy    Vasogenic cerebral edema (HCC)    Mass of temporoparietal region of brain    Encephalopathy    Delirium due to multiple etiologies         A/P:  This is a 73 y.o. female with meningioma     Patient care will be discussed with attending, will reevaluated patient along with attending.      - obtain MRI brain w and wo with stealth protocol    - Neuro checks per protocol  - Ok to begin prophylactic anticoagulation from neurosurgery stand point.       Additional recommendations to follow    Please contact neurosurgery with any changes in patients neurologic status.     Thank you for your consult.       SEBASTIAN Trevino pager [phone]  1/17/2024  10:22 AM

## 2024-01-17 NOTE — PROGRESS NOTES
Palliative Care Note:  Stopped down to 1 A nurses station and spoke with Julisa patient's nurse.  I let her know palliative care was following with patient at [facility].  If physicians would like for us to follow with patient we are available.  We would need a new referral to see patient.      [facility] Palliative Care Coordinator  Marge COHENN, RN, ONN-CG  [facility] [phone]/ [facility] [phone]/ [facility] [phone]

## 2024-01-17 NOTE — CONSULTS
Renal Consult Note    Patient :  Lupe Ojeda; 73 y.o. MRN# [id number]  Location:  0102/0102-01  Attending:  Bushra Palmer DO  Admit Date:  1/16/2024   Hospital Day: 1    Reason for Consult:     Asked by Bushra Vela DO to see for ESRD on HD.    History Obtained From:     Electronic medical record    HD Access:     previous [facility]    HD Unit:     [facility]    Nephrologist:     Dr. Novak    History of Present Illness:     Lupe Ojeda; 73 y.o. female with past medical history as mentioned below presented to the hospital as a direct transfer from [place] with the chief complaint of AMS. Originally admitted on 1/9, was also just discharged from there with same presenting symptoms just a few days prior to this presentation and at that time CT head was suggestive of meningioma with mass effect and an EEG concerning for possible seizures. She has had frequent episodes of altered mentation. Her admission was complicated by an ICU admission for agitation requiring precedex drip and Afib w/ RVR which she is now on PO amiodarone, Cardizem, and toprol. Transferred to [facility] for neurosurgery evaluation as her MRI showed worsening of brain mass and new vasogenic edema. Nephrology consulted for ESRD on HD.    Patient did have dialysis yesterday with 2.5L removed, tolerated well without complications.    Labs Reviewed:  Na 143, K 5.2, Cl 106, bicarb 26, BUN 26, Cr 2.7, Lactic acid 3.1, Gluc 240, Ca 9.2, NH3 32, TSH 1.11, WBC 8.8, Hgb 13.6, PLTs 147    Past History/Allergies?Social History:     Past Medical History:   Diagnosis Date    Acute CVA (cerebrovascular accident) ([facility]) 07/25/2020    Acute kidney injury superimposed on CKD ([facility]) 06/08/2022    Acute on chronic combined systolic (congestive) and diastolic (congestive) heart failure ([facility]) 02/06/2022    Acute respiratory failure with hypoxia and hypercapnia ([facility])     JOY (acute kidney injury) ([facility]) 01/15/2022    Arthritis     Asthma     Atrial  documented.  Addiitionally I recommend hemodialysis as scheduled tomorrow, Thursday, 1/18/2024.  Consider reevaluation of her estimated dry weight as the patient has likely lost some weight with decreased oral intake in the setting of her mentation issues.    Rogelio Judd MD  Nephrology Attending Physician  [facility]  1/17/2024     This note is created with the assistance of a speech-recognition program. While intending to generate a document that actually reflects the content of the visit, no guarantees can be provided that every mistake has been identified and corrected by editing.

## 2024-01-17 NOTE — CONSULTS
Palliative Care Inpatient Consult    NAME:  Lupe Ojeda  MEDICAL RECORD NUMBER:  [id number]  AGE: 73 y.o.   GENDER: female  : 1950  TODAY'S DATE:  2024    Reasons for Consultation:    Symptom and/or pain management  Provision of information regarding PC and/or hospice philosophies  Complex, time-intensive communication and interdisciplinary psychosocial support  Clarification of goals of care and/or assistance with difficult decision-making  Guidance in regards to resources and transition(s)    Members of PC team contributing to this consultation are : Adali Saenz, Palliative Care APRN-CNP    Plan      Palliative Interaction: Patient seen and examined on rounds. Patient disoriented, unable to participate in goals of care conversation at this time. Per RN patient having hallucinations. No family present at bedside.     Call placed to patients daughter Sea. Unable to reach her at this time. Voicemail left with call back number.     Patient is known to our team as we have been following while she was admitted at [place]. Have been in contact with family and discussed goals of care.     As per previous documentation patients 4 children are next of kin and understand that majority make decisions.     Patient transferred to [facility] for neurosurgery evaluation. Plan for new MRI imaging. Need nephrology clearance given patients renal disease.     Will follow up MRI results. Plan for continuation of goals of care pending this. Plan to meet family in person should they be present tomorrow, to formally introduce myself. Awaiting call back from Orah as well. Palliative to follow.       Education/support to staff  Education/support to family  Education/support to patient  Discharge planning/helping to coordinate care  Communications with primary service  Clarification of medical condition to patient and family  Code status clarified: Full Code  Principle  contrast.    COMPARISON:  MRI August 17, 2023    HISTORY:  ORDERING SYSTEM PROVIDED HISTORY: hx of R temporal mass  TECHNOLOGIST PROVIDED HISTORY:  hx of R temporal mass  Reason for Exam: AMS    FINDINGS:  INTRACRANIAL STRUCTURES/VENTRICLES: Right-sided anterior middle cranial fossa  contains a dural-based finding measuring 3.3 x 2.6 x 2.5 cm, grossly stable  from prior measurements of 2.8 x 2.4 x 2.3 cm.  The finding is isointense on  T1 weighted imaging and slightly hyperintense on FLAIR sequence.  Surrounding  vasogenic edema in the anterior right temporal lobe is stable from prior  examination.  There is no acute infarct. There is no midline shift.  No  evidence of an major intracranial hemorrhage (gradient sequences are not  available).  The ventricles and sulci are normal in size and configuration.  The sellar/suprasellar regions appear unremarkable.  The normal signal voids  within the major intracranial vessels appear maintained.    ORBITS: The visualized portion of the orbits demonstrate no acute abnormality.    SINUSES: The visualized paranasal sinuses and mastoid air cells demonstrate  no acute abnormality.    BONES/SOFT TISSUES: The bone marrow signal intensity appears normal. The soft  tissues demonstrate no acute abnormality.    Impression  1. Stable right-sided anterior middle cranial fossa meningioma. Stable  vasogenic edema and mild mass effect in the anterior right temporal lobe.  2. No acute intracranial abnormality.      10/25/23    ECHO (TTE) LIMITED (PRN CONTRAST/BUBBLE/STRAIN/3D) 10/25/2023  5:42 PM (Final)    Interpretation Summary    Left Ventricle: Mildly reduced left ventricular systolic function with a visually estimated EF of 40 - 45%. Left ventricle size is normal. Normal wall thickness. Mild global hypokinesis present.    Aortic Valve: Trileaflet valve. Mild regurgitation with a centrally directed jet. Mild stenosis of the aortic valve. AV mean gradient is 6 mmHg. AV area by continuity

## 2024-01-17 NOTE — PROGRESS NOTES
[facility] Nurse  Consult Note       NAME:  Lupe Ojeda  MEDICAL RECORD NUMBER:  [id number]  AGE: 73 y.o.   GENDER: female  : 1950  TODAY'S DATE:  2024    Subjective:     Reason for [facility] Evaluation and Assessment: \"buttock pressure wound\"      Lupe Ojeda is a 73 y.o. female referred by:   [x] Physician  [] Nursing  [] Other:     Wound Identification:  Wound Type: pressure  Contributing Factors: chronic pressure, decreased mobility, shear force, incontinence of stool, and incontinence of urine    Devitalized skin directly over site of previous pressure injury. Surrounding scattered deep tissue injured areas.  Bilateral lower legs are currently free from wounds.     Right heel non-blanchable erythema present.         Objective:      BP (!) 153/68   Pulse 94   Temp 97.7 °F (36.5 °C) (Axillary)   Resp 13   SpO2 97%   Jackson Risk Score:      LABS    CBC:   Lab Results   Component Value Date/Time    WBC 8.8 2024 05:54 AM    RBC 4.55 2024 05:54 AM    HGB 13.6 2024 05:54 AM    HCT 43.3 2024 05:54 AM     CMP:  Albumin:    Lab Results   Component Value Date/Time    LABALBU 3.4 2024 08:37 AM     PT/INR:    Lab Results   Component Value Date/Time    PROTIME 13.6 2023 04:02 PM    INR 1.0 2023 04:02 PM     HgBA1c:    Lab Results   Component Value Date/Time    LABA1C 5.5 10/16/2023 05:09 PM     PTT: No components found for: \"LABPTT\"      Assessment:     Devitalized skin directly over site of previous pressure injury. Surrounding scattered deep tissue injured areas.  Bilateral lower legs are currently free from wounds.     Right heel non-blanchable erythema present  Large, brown, loose fecal incontinence encountered on exam.   Measurements:     24 0840   Wound 10/18/23 Coccyx wound #1 coccyx   Date First Assessed/Time First Assessed: 10/18/23 1328   Present on Original Admission: Yes  Wound Approximate Age at First Assessment (Weeks): 3

## 2024-01-17 NOTE — CARE COORDINATION
Case Management Assessment  Initial Evaluation    Date/Time of Evaluation: 1/17/2024 11:20 AM  Assessment Completed by: Violeta Sarkar RN    If patient is discharged prior to next notation, then this note serves as note for discharge by case management.    Patient Name: Lupe Ojeda                   YOB: 1950  Diagnosis: Encephalopathy [G93.40]  Delirium due to multiple etiologies [F05]                   Date / Time: 1/16/2024 11:30 PM    Patient Admission Status: Inpatient   Readmission Risk (Low < 19, Mod (19-27), High > 27): Readmission Risk Score: 27    Current PCP: Kayleigh Cardoso MD  PCP verified by CM? Yes (Dr. Kayleigh Cardoso)    Chart Reviewed: Yes      History Provided by: Child/Family, Medical Record  Patient Orientation: Alert and Oriented, Person    Patient Cognition: Other (see comment) (confusion)    Hospitalization in the last 30 days (Readmission):  No    If yes, Readmission Assessment in  Navigator will be completed.    Advance Directives:      Code Status: Full Code   Patient's Primary Decision Maker is: Legal Next of Kin    Primary Decision Maker: OmarSea - Child - [phone]    Primary Decision Maker: OMARCYRIL - Child - [phone]    Primary Decision Maker: OMARAZALEA - Child - [phone]    Primary Decision Maker: Kimberly Lema - Child - [phone]    Discharge Planning:    Patient lives with: (P) Children Type of Home: (P) House  Primary Care Giver: Family  Patient Support Systems include: Children, Family Members   Current Financial resources: [place], [place]  Current community resources: (P) Other (Comment) (Dialysis TTS at [facility])  Current services prior to admission: (P) Durable Medical Equipment, Oxygen Therapy            Current DME: (P) Bedside Commode, Cane, Wheelchair, Walker, Oxygen Therapy (Comment), Home Aerosol, Other (Comment) (Lift Chair meaghan, aerosol.  Home O2 thru [employer] wears at )            Type of Home Care

## 2024-01-17 NOTE — H&P
[facility] Neurology   IN-PATIENT SERVICE   [facility]    HISTORY AND PHYSICAL EXAMINATION            Date:   1/17/2024  Patient name:  Lupe Ojeda  Date of admission:  1/16/2024 11:30 PM  MRN:   [id number]  Account:  [id number]  YOB: 1950  PCP:    Kayleigh Cardoso MD  Room:   [address] [place] [address]  Code Status:    Full Code    Chief Complaint:     No chief complaint on file.      History Obtained From:     electronic medical record    History of Present Illness:     The patient is a 73 y.o.  Non- / non  female who presents with No chief complaint on file.   and she is admitted to the hospital for the management of  AMS   Patient presented as a direct transfer from [place]. History was obtained from electronic medical records since patient is delirious and is not able to provide any meaningful history.  She has extensive PMH including CAD s/p CABG, CHF with reduced EF, paroxysmal A-fib (on anticoagulation), ESRD (on hemodialysis), bilateral cataract, DM type II, hypothyroidism, HTN, COPD.  As per medical chart, patient had frequent episodes of AMS, agitations and hallucinations.  There is also a suspicion for underlying dementia due to progressive cognitive decline that was reported by family.  There is a concern for underlying seizure seizure disorder due to abnormal EEG, most recent injury EEG (January 2024): Intermittent generalized epileptiform discharges, patient is currently on Vimpat  Most recent MRI was obtained 6 days ago, on 1/11/2024 -showed stable right-sided anterior medial cranial fossa meningioma.  Stable vasogenic edema and mild mass effect is the anterior right temporal lobe.  Patient required Precedex drip due to agitation and combativeness over the course of her most recent admission at [facility].  Of note, patient had an admission to [place] for fluctuating AMS and intermittent agitation.  She was seen and evaluated by neurosurgery at

## 2024-01-18 ENCOUNTER — APPOINTMENT (OUTPATIENT)
Dept: MRI IMAGING | Age: 74
DRG: 080 | End: 2024-01-18
Attending: PSYCHIATRY & NEUROLOGY
Payer: MEDICARE

## 2024-01-18 PROBLEM — I50.23 ACUTE ON CHRONIC SYSTOLIC CONGESTIVE HEART FAILURE (HCC): Status: ACTIVE | Noted: 2022-02-06

## 2024-01-18 PROBLEM — Z01.810 PREOP CARDIOVASCULAR EXAM: Status: ACTIVE | Noted: 2024-01-17

## 2024-01-18 PROBLEM — N18.6 ESRD (END STAGE RENAL DISEASE) (HCC): Status: ACTIVE | Noted: 2024-01-18

## 2024-01-18 LAB
ANION GAP SERPL CALCULATED.3IONS-SCNC: 14 MMOL/L (ref 9–16)
BASOPHILS # BLD: 0 K/UL (ref 0–0.2)
BASOPHILS NFR BLD: 0 % (ref 0–2)
BUN SERPL-MCNC: 40 MG/DL (ref 8–23)
CALCIUM SERPL-MCNC: 8.4 MG/DL (ref 8.6–10.4)
CHLORIDE SERPL-SCNC: 109 MMOL/L (ref 98–107)
CO2 SERPL-SCNC: 19 MMOL/L (ref 20–31)
CREAT SERPL-MCNC: 3.5 MG/DL (ref 0.5–0.9)
EOSINOPHIL # BLD: 0 K/UL (ref 0–0.4)
EOSINOPHILS RELATIVE PERCENT: 0 % (ref 1–4)
ERYTHROCYTE [DISTWIDTH] IN BLOOD BY AUTOMATED COUNT: 17.3 % (ref 11.8–14.4)
GFR SERPL CREATININE-BSD FRML MDRD: 13 ML/MIN/1.73M2
GLUCOSE BLD-MCNC: 175 MG/DL (ref 65–105)
GLUCOSE BLD-MCNC: 220 MG/DL (ref 65–105)
GLUCOSE SERPL-MCNC: 282 MG/DL (ref 74–99)
HCT VFR BLD AUTO: 37.3 % (ref 36.3–47.1)
HGB BLD-MCNC: 11.6 G/DL (ref 11.9–15.1)
IMM GRANULOCYTES # BLD AUTO: 0.14 K/UL (ref 0–0.3)
IMM GRANULOCYTES NFR BLD: 2 %
LACTIC ACID, WHOLE BLOOD: 2.8 MMOL/L (ref 0.7–2.1)
LYMPHOCYTES NFR BLD: 0.2 K/UL (ref 1–4.8)
LYMPHOCYTES RELATIVE PERCENT: 3 % (ref 24–44)
MCH RBC QN AUTO: 29.5 PG (ref 25.2–33.5)
MCHC RBC AUTO-ENTMCNC: 31.1 G/DL (ref 28.4–34.8)
MCV RBC AUTO: 94.9 FL (ref 82.6–102.9)
MONOCYTES NFR BLD: 0.27 K/UL (ref 0.1–0.8)
MONOCYTES NFR BLD: 4 % (ref 1–7)
MORPHOLOGY: ABNORMAL
NEUTROPHILS NFR BLD: 91 % (ref 36–66)
NEUTS SEG NFR BLD: 6.19 K/UL (ref 1.8–7.7)
NRBC BLD-RTO: 0 PER 100 WBC
PHOSPHATE SERPL-MCNC: 3.5 MG/DL (ref 2.5–4.5)
PLATELET # BLD AUTO: ABNORMAL K/UL (ref 138–453)
PLATELET, FLUORESCENCE: 116 K/UL (ref 138–453)
PLATELETS.RETICULATED NFR BLD AUTO: 5 % (ref 1.1–10.3)
POTASSIUM SERPL-SCNC: 4.3 MMOL/L (ref 3.7–5.3)
RBC # BLD AUTO: 3.93 M/UL (ref 3.95–5.11)
SODIUM SERPL-SCNC: 142 MMOL/L (ref 136–145)
WBC OTHER # BLD: 6.8 K/UL (ref 3.5–11.3)

## 2024-01-18 PROCEDURE — 2060000000 HC ICU INTERMEDIATE R&B

## 2024-01-18 PROCEDURE — 90935 HEMODIALYSIS ONE EVALUATION: CPT

## 2024-01-18 PROCEDURE — 5A1D70Z PERFORMANCE OF URINARY FILTRATION, INTERMITTENT, LESS THAN 6 HOURS PER DAY: ICD-10-PCS | Performed by: INTERNAL MEDICINE

## 2024-01-18 PROCEDURE — 80048 BASIC METABOLIC PNL TOTAL CA: CPT

## 2024-01-18 PROCEDURE — 6370000000 HC RX 637 (ALT 250 FOR IP): Performed by: STUDENT IN AN ORGANIZED HEALTH CARE EDUCATION/TRAINING PROGRAM

## 2024-01-18 PROCEDURE — 99223 1ST HOSP IP/OBS HIGH 75: CPT | Performed by: INTERNAL MEDICINE

## 2024-01-18 PROCEDURE — 90935 HEMODIALYSIS ONE EVALUATION: CPT | Performed by: INTERNAL MEDICINE

## 2024-01-18 PROCEDURE — 6360000002 HC RX W HCPCS: Performed by: INTERNAL MEDICINE

## 2024-01-18 PROCEDURE — 82947 ASSAY GLUCOSE BLOOD QUANT: CPT

## 2024-01-18 PROCEDURE — 2580000003 HC RX 258: Performed by: INTERNAL MEDICINE

## 2024-01-18 PROCEDURE — 99232 SBSQ HOSP IP/OBS MODERATE 35: CPT | Performed by: PSYCHIATRY & NEUROLOGY

## 2024-01-18 PROCEDURE — 6370000000 HC RX 637 (ALT 250 FOR IP): Performed by: INTERNAL MEDICINE

## 2024-01-18 PROCEDURE — 84100 ASSAY OF PHOSPHORUS: CPT

## 2024-01-18 PROCEDURE — 95714 VEEG EA 12-26 HR UNMNTR: CPT

## 2024-01-18 PROCEDURE — 85055 RETICULATED PLATELET ASSAY: CPT

## 2024-01-18 PROCEDURE — 2000000000 HC ICU R&B

## 2024-01-18 PROCEDURE — 85025 COMPLETE CBC W/AUTO DIFF WBC: CPT

## 2024-01-18 PROCEDURE — C9254 INJECTION, LACOSAMIDE: HCPCS | Performed by: INTERNAL MEDICINE

## 2024-01-18 PROCEDURE — 2500000003 HC RX 250 WO HCPCS: Performed by: INTERNAL MEDICINE

## 2024-01-18 PROCEDURE — 36415 COLL VENOUS BLD VENIPUNCTURE: CPT

## 2024-01-18 PROCEDURE — 94640 AIRWAY INHALATION TREATMENT: CPT

## 2024-01-18 PROCEDURE — 83605 ASSAY OF LACTIC ACID: CPT

## 2024-01-18 PROCEDURE — 95720 EEG PHY/QHP EA INCR W/VEEG: CPT | Performed by: PSYCHIATRY & NEUROLOGY

## 2024-01-18 RX ORDER — HEPARIN SODIUM 1000 [USP'U]/ML
1600 INJECTION, SOLUTION INTRAVENOUS; SUBCUTANEOUS PRN
Status: DISCONTINUED | OUTPATIENT
Start: 2024-01-18 | End: 2024-01-23 | Stop reason: HOSPADM

## 2024-01-18 RX ORDER — INSULIN LISPRO 100 [IU]/ML
0-4 INJECTION, SOLUTION INTRAVENOUS; SUBCUTANEOUS
Status: DISCONTINUED | OUTPATIENT
Start: 2024-01-18 | End: 2024-01-19

## 2024-01-18 RX ORDER — DEXTROSE MONOHYDRATE 100 MG/ML
INJECTION, SOLUTION INTRAVENOUS CONTINUOUS PRN
Status: DISCONTINUED | OUTPATIENT
Start: 2024-01-18 | End: 2024-01-23 | Stop reason: HOSPADM

## 2024-01-18 RX ORDER — INSULIN LISPRO 100 [IU]/ML
0-4 INJECTION, SOLUTION INTRAVENOUS; SUBCUTANEOUS NIGHTLY
Status: DISCONTINUED | OUTPATIENT
Start: 2024-01-18 | End: 2024-01-19

## 2024-01-18 RX ADMIN — METOPROLOL SUCCINATE 100 MG: 25 TABLET, EXTENDED RELEASE ORAL at 16:09

## 2024-01-18 RX ADMIN — MUPIROCIN: 20 OINTMENT TOPICAL at 16:12

## 2024-01-18 RX ADMIN — FAMOTIDINE 10 MG: 20 TABLET, FILM COATED ORAL at 06:00

## 2024-01-18 RX ADMIN — SODIUM CHLORIDE: 9 INJECTION, SOLUTION INTRAVENOUS at 17:29

## 2024-01-18 RX ADMIN — LACOSAMIDE 100 MG: 10 INJECTION INTRAVENOUS at 20:44

## 2024-01-18 RX ADMIN — DEXAMETHASONE SODIUM PHOSPHATE 2 MG: 4 INJECTION, SOLUTION INTRA-ARTICULAR; INTRALESIONAL; INTRAMUSCULAR; INTRAVENOUS; SOFT TISSUE at 20:44

## 2024-01-18 RX ADMIN — BUDESONIDE AND FORMOTEROL FUMARATE DIHYDRATE 2 PUFF: 160; 4.5 AEROSOL RESPIRATORY (INHALATION) at 21:04

## 2024-01-18 RX ADMIN — ANTI-FUNGAL POWDER MICONAZOLE NITRATE TALC FREE: 1.42 POWDER TOPICAL at 20:45

## 2024-01-18 RX ADMIN — ASPIRIN 81 MG: 81 TABLET, COATED ORAL at 16:09

## 2024-01-18 RX ADMIN — DEXAMETHASONE SODIUM PHOSPHATE 2 MG: 4 INJECTION, SOLUTION INTRA-ARTICULAR; INTRALESIONAL; INTRAMUSCULAR; INTRAVENOUS; SOFT TISSUE at 16:09

## 2024-01-18 RX ADMIN — MIRTAZAPINE 15 MG: 15 TABLET, FILM COATED ORAL at 20:44

## 2024-01-18 RX ADMIN — HEPARIN SODIUM 1600 UNITS: 1000 INJECTION INTRAVENOUS; SUBCUTANEOUS at 12:22

## 2024-01-18 RX ADMIN — SODIUM BICARBONATE 650 MG: 648 TABLET ORAL at 16:13

## 2024-01-18 RX ADMIN — HEPARIN SODIUM 5000 UNITS: 5000 INJECTION INTRAVENOUS; SUBCUTANEOUS at 06:05

## 2024-01-18 RX ADMIN — LEVOTHYROXINE SODIUM 25 MCG: 50 TABLET ORAL at 05:58

## 2024-01-18 RX ADMIN — HEPARIN SODIUM 5000 UNITS: 5000 INJECTION INTRAVENOUS; SUBCUTANEOUS at 16:09

## 2024-01-18 RX ADMIN — DILTIAZEM HYDROCHLORIDE 60 MG: 60 TABLET, FILM COATED ORAL at 16:10

## 2024-01-18 RX ADMIN — CETIRIZINE HYDROCHLORIDE 5 MG: 10 TABLET ORAL at 16:11

## 2024-01-18 RX ADMIN — LACOSAMIDE 100 MG: 10 INJECTION INTRAVENOUS at 16:09

## 2024-01-18 RX ADMIN — DILTIAZEM HYDROCHLORIDE 60 MG: 60 TABLET, FILM COATED ORAL at 06:01

## 2024-01-18 RX ADMIN — HEPARIN SODIUM 1600 UNITS: 1000 INJECTION INTRAVENOUS; SUBCUTANEOUS at 12:23

## 2024-01-18 RX ADMIN — ANTI-FUNGAL POWDER MICONAZOLE NITRATE TALC FREE: 1.42 POWDER TOPICAL at 16:10

## 2024-01-18 RX ADMIN — SODIUM BICARBONATE 650 MG: 648 TABLET ORAL at 20:44

## 2024-01-18 RX ADMIN — SODIUM CHLORIDE, PRESERVATIVE FREE 10 ML: 5 INJECTION INTRAVENOUS at 20:45

## 2024-01-18 RX ADMIN — AMIODARONE HYDROCHLORIDE 100 MG: 200 TABLET ORAL at 16:09

## 2024-01-18 RX ADMIN — DEXAMETHASONE SODIUM PHOSPHATE 2 MG: 4 INJECTION, SOLUTION INTRA-ARTICULAR; INTRALESIONAL; INTRAMUSCULAR; INTRAVENOUS; SOFT TISSUE at 03:50

## 2024-01-18 RX ADMIN — CLOPIDOGREL BISULFATE 75 MG: 75 TABLET ORAL at 16:10

## 2024-01-18 RX ADMIN — ATORVASTATIN CALCIUM 40 MG: 40 TABLET, FILM COATED ORAL at 20:44

## 2024-01-18 RX ADMIN — DILTIAZEM HYDROCHLORIDE 60 MG: 60 TABLET, FILM COATED ORAL at 00:11

## 2024-01-18 ASSESSMENT — PAIN SCALES - GENERAL
PAINLEVEL_OUTOF10: 0
PAINLEVEL_OUTOF10: 0
PAINLEVEL_OUTOF10: 3
PAINLEVEL_OUTOF10: 0

## 2024-01-18 ASSESSMENT — PAIN DESCRIPTION - LOCATION: LOCATION: GENERALIZED;HEAD

## 2024-01-18 ASSESSMENT — PAIN DESCRIPTION - PAIN TYPE: TYPE: ACUTE PAIN;CHRONIC PAIN

## 2024-01-18 NOTE — PROGRESS NOTES
[facility] Neurology   IN-PATIENT SERVICE  General Neurology Progress Note   [facility]                Date:   1/18/2024  Patient name:  Lupe Ojeda  Date of admission:  1/16/2024 11:30 PM  MRN:   [id number]  Account:  [id number]  YOB: 1950  PCP:    Kayleigh Cardoso MD  Room:   [address] [place] [address]  Code Status:    Full Code  Chief Complaint: No chief complaint on file.      Interval history      The patient was seen and examined at bedside this morning.  Patient was somnolent and arousable to aggressive sternal rub.  Resting comfortably in bed in no acute distress.    Risperidone held. Cardiology consulted for cardiac clearance per neurosurg rec.    Labs, chart, and VS reviewed. No acute events overnight.     Brief History     Lupe Ojeda is a 73 y.o. 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  Patient was having waxing and waning mental status with associated hallucinations.  MRI and CT show no acute intracranial abnormalities, just stable meningioma in the right temporal lobe.  On exam, patient was lethargic, unable to answer questions, but was able to follow simple commands such as squeezing fingers and wiggling toes.  Patient was started on LTME to evaluate for possible seizure etiology which is shown occasional right parietal and occipital sharps and diffuse polymorphic delta and theta slowing.  Neurosurgery was consulted for possible intervention of meningioma, recommending MRI with Stealth protocol.      Review of Systems   Review of Systems   Reason unable to perform ROS: altered mental status.       Past Medical  recommending MRI brain with and without contrast with stealth protocol to better characterize the mass. Patient on LTME to rule out seizure etiology-so far has shown occasional right parietal and occipital sharps in addition to diffuse polymorphic delta and theta slowing.      Delirium superimposed on underlying dementia, likely multifactorial in nature   -TSH and ammonia wnl,  -UA pending  -CBC showed 2 point drop in Hgb  -BMP unremarkable, Cr 3.5  -lactic acid elevated at 2.8   -Risperdal held     Right temporal meningioma  - Most recent MRI brain (1/11/2024): Stable right-sided anterior middle cranial fossa meningioma. Stable vasogenic edema and mild mass effect in the anterior right temporal lobe  - Continue Decadron 2 mg TID  - Patient was seen and evaluated by NS in [facility] in August 2023, recommended outpatient follow up in 6 weeks, but, apparently, patient did not follow-up   -Cardiology consulted for cardiac clearance     Underlying seizure disorder  - Continue IV Vimpat 100 mg  -LTME to r/o seizure etiology    Hyperglycemia  -start low dose sliding scale  -hypoglycemia protocol     ESRD, on hemodialysis  -nephrology following      Diet: Regular  DVT prophylaxis: On heparin 500 units 3 times daily  PT/OT/SW    Patient to be discussed with attending physician, Dr. Spencer Ortez MD  Transitional Year Resident PGY-1  1/18/2024 9:04 AM    Copy sent to Kayleigh Pittman MD    This note is created with the assistance of a speech-recognition program. While intending to generate a document that actually reflects the content of the visit, the document can still have some errors including those of syntax and sound a- like substitutions which may escape proofreading. In such instances, actual meaning can be extrapolated by contextual derivation.

## 2024-01-18 NOTE — PROGRESS NOTES
Dialysis Time Out  To be done by RN and tech or 2 RNs  Staff Names Aneudy ASHRAF OCDT    [x]  Identity of the patient using 2 patient identifiers  [x]  Consent for treatment  [x]  Equipment-proper machine and dialyzer  [x]  B-Hep B status  [x]  Orders- to include bath, blood flow, dialyzer, time and fluid removal  [x]  Access-Correct site and in working order  [x]  Time for patient to ask questions.

## 2024-01-18 NOTE — PROGRESS NOTES
Patient came to MRI for Brain wo contrast exam. Patient was slid onto table into position for MRI and was unable to lay flat enough to fit into the head coil for our exam. Patient unable to be scanned and was sent back to room VIA transport. RN was updated of these findings.

## 2024-01-18 NOTE — PROCEDURES
(TYLENOL) tablet 650 mg  650 mg Oral Q6H PRN Randy Silva MD        Or    acetaminophen (TYLENOL) suppository 650 mg  650 mg Rectal Q6H PRN Randy Silva MD        0.9 % sodium chloride infusion   IntraVENous Continuous Randy Silva MD 35 mL/hr at 01/17/24 0543 New Bag at 01/17/24 0543    cetirizine (ZYRTEC) tablet 5 mg  5 mg Oral Daily Randy Silva MD   5 mg at 01/17/24 0856    anticoagulant sodium citrate 4 % injection 1.9 mL  1.9 mL IntraCATHeter PRN Randy Silva MD        anticoagulant sodium citrate 4 % injection 1.9 mL  1.9 mL IntraCATHeter PRN Randy Silva MD        hydrALAZINE (APRESOLINE) injection 10 mg  10 mg IntraVENous Q6H PRN Randy Silva MD        dexAMETHasone (DECADRON) injection 2 mg  2 mg IntraVENous Q8H Randy Silva MD   2 mg at 01/18/24 0350    risperiDONE (RISPERDAL M-TABS) disintegrating tablet 1 mg  1 mg Oral BID Randy Silva MD   1 mg at 01/17/24 2049    dilTIAZem (CARDIZEM) tablet 60 mg  60 mg Oral 4 times per day Randy Silva MD   60 mg at 01/18/24 0601    sodium bicarbonate tablet 650 mg  650 mg Oral TID Randy Silva MD   650 mg at 01/17/24 2050     Technical Description: This is a 21-channel digital EEG recording with time-locked video. Electrodes were placed in accordance with the 10-20 International System of Electrode Placement. Single lead EKG monitoring was included.    Baseline EEG Recording:  A formal baseline EEG recording was not obtained.     Day 1 - 1/17/24, starting at 17:13    Interictal EEG Samples: The background activity consisted of 6 to 7 Hz of polymorphic theta activity.  Frequent intrusions of 3 to 4 Hz of diffuse polymorphic delta activity was seen of 15-30 µV.  Occasional right parietal occipital sharp waves were seen, at times with broad field.  During behavioral sleep, sleep architecture was not seen.  The EKG channel revealed no abnormalities.    Ictal EEG Recording / Patient Events: During this  period the patient had no events or seizures.    Summary: During this day of recording no events were recorded. The interictal EEG was abnormal due to diffuse polymorphic delta and theta slowing suggesting mild to moderate encephalopathy.  Occasional right parietal occipital sharp waves conferred an increased risk for focal onset seizures. Monitoring was continued in order to record the patient's typical events. The EKG channel revealed no abnormalities.    Day 2 - 1/18/24, reviewed through 7:30 am    Interictal EEG Samples: Interictal EEG was unchanged from yesterday.    Ictal EEG Recording / Patient Events: During this period the patient had no events or seizures.    Summary: During this day of recording no events were recorded. The interictal EEG was abnormal due to diffuse polymorphic theta slowing suggesting mild to moderate encephalopathy.  Occasional right parietal occipital sharp waves conferred an increased risk for focal onset seizures. Monitoring was continued in order to record the patient's typical events. The EKG channel revealed no abnormalities.    KATALINA HERRERA MD  Diplomate, American Board of Psychiatry and Neurology  Diplomate, American Board of Clinical Neurophysiology  Diplomate, American Board of Epilepsy       Please note this is a preliminary report and updated daily. The final report will have a summary of behavior and electrographic findings with clinical correlation.

## 2024-01-18 NOTE — PLAN OF CARE
Problem: Safety - Adult  Goal: Free from fall injury  Outcome: Progressing  Flowsheets (Taken 1/17/2024 2000)  Free From Fall Injury: Instruct family/caregiver on patient safety     Problem: Chronic Conditions and Co-morbidities  Goal: Patient's chronic conditions and co-morbidity symptoms are monitored and maintained or improved  Outcome: Progressing  Flowsheets (Taken 1/17/2024 2000)  Care Plan - Patient's Chronic Conditions and Co-Morbidity Symptoms are Monitored and Maintained or Improved: Monitor and assess patient's chronic conditions and comorbid symptoms for stability, deterioration, or improvement     Problem: Discharge Planning  Goal: Discharge to home or other facility with appropriate resources  Outcome: Progressing  Flowsheets (Taken 1/17/2024 2000)  Discharge to home or other facility with appropriate resources: Identify barriers to discharge with patient and caregiver     Problem: Skin/Tissue Integrity  Goal: Absence of new skin breakdown  Description: 1.  Monitor for areas of redness and/or skin breakdown  2.  Assess vascular access sites hourly  3.  Every 4-6 hours minimum:  Change oxygen saturation probe site  4.  Every 4-6 hours:  If on nasal continuous positive airway pressure, respiratory therapy assess nares and determine need for appliance change or resting period.  Outcome: Progressing

## 2024-01-18 NOTE — CONSULTS
Once a day    Provider, MD Mino   Wound Dressings (ZENIFOAM GENTLE 9\"X9\"/SACRAL) PADS Apply 1 each topically daily 10/16/23   Santo Pratt APRN - CNP   nystatin (MYCOSTATIN) 151340 UNIT/GM powder Apply 3 times daily. 10/16/23   Santo Pratt APRN - CNP   Lift Chair MISC by Does not apply route 8/23/23   Kayleigh Cardoso MD   metoprolol succinate (TOPROL XL) 25 MG extended release tablet Take 1 tablet by mouth every evening Hold if BP bellow 115/65. Stop metoprolol tartrate 8/10/23   Kayleigh Cardoso MD   hydrOXYzine HCl (ATARAX) 50 MG tablet Take 1 tablet by mouth daily as needed for Anxiety 8/10/23   Kayleigh Cardoso MD   albuterol (PROVENTIL) (2.5 MG/3ML) 0.083% nebulizer solution Take 3 mLs by nebulization every 6 hours as needed for Wheezing or Shortness of Breath 8/9/23   Kayleigh Cardoso MD   polyethylene glycol (MIRALAX) 17 GM/SCOOP powder Take 17 g by mouth daily 8/2/23   Kayleigh Cardoso MD   lidocaine (LMX) 4 % cream 5 g topical 2-3 times a day as needed for pain, maximum 20 g/day 7/26/23   Kayleigh Cardoso MD   albuterol sulfate HFA (PROAIR HFA) 108 (90 Base) MCG/ACT inhaler Inhale 2 puffs into the lungs every 6 hours as needed for Wheezing or Shortness of Breath (cough) 7/19/23   Kayleigh Cardoso MD   levothyroxine (SYNTHROID) 25 MCG tablet Take 1 tablet by mouth every morning (before breakfast) without no other meds for 30 minutes, take when you first wake up and you are still bed 7/13/23   Kayleigh Cardoso MD   midodrine (PROAMATINE) 5 MG tablet Take 1 tablet by mouth 2 times daily as needed (if low BP below 115/65, take to dialysis) 7/11/23   Kayleigh Cardoso MD   acetaminophen (TYLENOL) 500 MG tablet Take 1 tablet by mouth every 6 hours as needed for Pain or Fever    Provider, MD Mino   Elastic Bandages & Supports (KNEE BRACE/FLEX STAYS MEDIUM) MISC as directed 2/15/23   Provider, MD Mino   amLODIPine (NORVASC) 10 MG tablet Take 1 tablet by  Valve: Trileaflet valve. Mild regurgitation with a centrally directed jet. Mild stenosis of the aortic valve. AV mean gradient is 6 mmHg. AV area by continuity VTI is 1.8 cm2.    Mitral Valve: Mildly thickened leaflet, at the anterior and posterior leaflets.    Tricuspid Valve: Moderate regurgitation with a centrally directed jet. ERO .34 cm2 and RV 39.2 mls.    Left Atrium: Left atrium is dilated.    Last MUGA scan 10/26/2023:    Left ventricular ejection fraction is 36%, abnormally low. No focal left ventricular wall motion abnormality.   Last Stress test/ [facility]:   CARDIAC CATH 11/27/2023  3:39 PM (Final)     Conclusion    Multivessel coronary artery disease    Patent LIMA-LAD and SVG-RPDA grafts. Known occluded SVG-OM.    Successful PTCA/SONNY of mid left circumflex    Normal LV systolic function with estimated EF 55-60%. Normal LVEDP    High grade stenosis in the proximal right Common iliac artery noted on CFA angiogram    IMPRESSION:    Principal Problem:    Encephalopathy  Active Problems:    Delirium due to multiple etiologies    Cognitive impairment    Hallucinations    Encounter for palliative care  Resolved Problems:    * No resolved hospital problems. *      1. CABG with LIMA-LAD, SVG-OM and SVG-RCA in 2003.  2. Occluded SVG-OM2 by heart catheterization in 2006.  3. EF 35% on MUGA scan  4. Hyperlipidemia.  5. Diabetes.  6. Stress test in April 2011 suggested old lateral infarction, small area, with preserved LV  function 64% and no ischemia.  7. 7/28/20 [facility] w/ Celllulitis/sepsis. Afib RVR,not on AC due to bleeding  8. 7/31/20- KARLA/CV 200J x1. Eliquis, LVEF 55%, moderate atheromatous disease in the arch, moderate AI, mild MR mild TR9. 03/14/2022 limited study echo LVEF 60-65% mildly dilated LA  10. Admitted in [facility] on 03/01/2022, hemoglobin was 4.1 according to the daughter status post blood transfusion  11. ECHO 2/7/2022 Normal LV size. LVEF > 55%. No WMA. Moderate LVH. Normal RV size and function.

## 2024-01-18 NOTE — PLAN OF CARE
Problem: Safety - Adult  Goal: Free from fall injury  1/18/2024 0942 by Shanthi Carson RN  Outcome: Progressing  1/18/2024 0612 by Sapna Davidson RN  Outcome: Progressing  Flowsheets (Taken 1/17/2024 2000)  Free From Fall Injury: Instruct family/caregiver on patient safety     Problem: Chronic Conditions and Co-morbidities  Goal: Patient's chronic conditions and co-morbidity symptoms are monitored and maintained or improved  1/18/2024 0942 by Shanthi Carson RN  Outcome: Progressing  1/18/2024 0612 by Sapna Davidson RN  Outcome: Progressing  Flowsheets (Taken 1/17/2024 2000)  Care Plan - Patient's Chronic Conditions and Co-Morbidity Symptoms are Monitored and Maintained or Improved: Monitor and assess patient's chronic conditions and comorbid symptoms for stability, deterioration, or improvement     Problem: Discharge Planning  Goal: Discharge to home or other facility with appropriate resources  1/18/2024 0942 by Shanthi Carson RN  Outcome: Progressing  1/18/2024 0612 by Sapna Davidson RN  Outcome: Progressing  Flowsheets (Taken 1/17/2024 2000)  Discharge to home or other facility with appropriate resources: Identify barriers to discharge with patient and caregiver     Problem: Skin/Tissue Integrity  Goal: Absence of new skin breakdown  Description: 1.  Monitor for areas of redness and/or skin breakdown  2.  Assess vascular access sites hourly  3.  Every 4-6 hours minimum:  Change oxygen saturation probe site  4.  Every 4-6 hours:  If on nasal continuous positive airway pressure, respiratory therapy assess nares and determine need for appliance change or resting period.  1/18/2024 0942 by Shanthi Carson RN  Outcome: Progressing  1/18/2024 0612 by Sapna Davidson RN  Outcome: Progressing     Problem: Respiratory - Adult  Goal: Achieves optimal ventilation and oxygenation  1/18/2024 0942 by Shanthi Carson RN  Outcome: Progressing  1/18/2024 0612 by Sapna Davidson RN  Outcome: Progressing  1/18/2024 0000 by

## 2024-01-18 NOTE — PROCEDURES
injection 1.9 mL  1.9 mL IntraCATHeter PRN Randy Silva MD        anticoagulant sodium citrate 4 % injection 1.9 mL  1.9 mL IntraCATHeter PRN Randy Silva MD        hydrALAZINE (APRESOLINE) injection 10 mg  10 mg IntraVENous Q6H PRN Randy Silva MD        dexAMETHasone (DECADRON) injection 2 mg  2 mg IntraVENous Q8H RicardoRandy garcia MD   2 mg at 01/17/24 1353    risperiDONE (RISPERDAL M-TABS) disintegrating tablet 1 mg  1 mg Oral BID RicardoRandy garcia MD   1 mg at 01/17/24 2049    dilTIAZem (CARDIZEM) tablet 60 mg  60 mg Oral 4 times per day Randy Silva MD   60 mg at 01/17/24 1714    sodium bicarbonate tablet 650 mg  650 mg Oral TID Randy Silva MD   650 mg at 01/17/24 2050     Technical Description: This is a 21-channel digital EEG recording with time-locked video. Electrodes were placed in accordance with the 10-20 International System of Electrode Placement. Single lead EKG monitoring was included.    Baseline EEG Recording:  A formal baseline EEG recording was not obtained.     Day 1 - 1/17/24, starting at 17:13    Interictal EEG Samples: The background activity consisted of 6 to 7 Hz of polymorphic theta activity.  Frequent intrusions of 3 to 4 Hz of diffuse polymorphic delta activity was seen of 15-30 µV.  Occasional right parietal occipital sharp waves were seen, at times with broad field.  During behavioral sleep, sleep architecture was not seen.  The EKG channel revealed no abnormalities.    Ictal EEG Recording / Patient Events: During this period the patient had no events or seizures.    Summary: During this day of recording no events were recorded. The interictal EEG was abnormal due to diffuse polymorphic delta and theta slowing suggesting mild to moderate encephalopathy.  Occasional right parietal occipital sharp waves conferred an increased risk for focal onset seizures. Monitoring was continued in order to record the patient's typical events. The EKG channel revealed

## 2024-01-18 NOTE — PROGRESS NOTES
NEPHROLOGY DIALYSIS NOTE    PROCEDURE    Patient seen on Hemodialysis  1/18/2024 at 10:45 AM  Access cannulated without problems      TREATMENT ORDERS   See dialysis flowsheet for specifics on access, blood flow rate, dialysate baths, duration of dialysis, anticoagulation and other technical information.    Dialysis Bath  K+ (Potassium): 3  Ca+ (Calcium): 2.25  Na+ (Sodium): 140  HCO3 (Bicarb): 35       INVESTIGATIONS     Last 3 CMP:    Recent Labs     01/16/24  0837 01/17/24  0554 01/18/24  0613    143 142   K 4.5 5.2 4.3    106 109*   CO2 20 26 19*   BUN 48* 26* 40*   CREATININE 3.8* 2.7* 3.5*   CALCIUM 9.0 9.2 8.4*   LABALBU 3.4*  --   --        Last 3 CBC:  Recent Labs     01/17/24  0554 01/18/24  0613   WBC 8.8 6.8   RBC 4.55 3.93*   HGB 13.6 11.6*   HCT 43.3 37.3   MCV 95.2 94.9   MCH 29.9 29.5   MCHC 31.4 31.1   RDW 17.4* 17.3*    See Reflexed IPF Result   MPV 10.5  --          GFR: Estimated Creatinine Clearance: 12 mL/min (A) (based on SCr of 3.5 mg/dL (H)).  Phosphorus:    Recent Labs     01/16/24  0837 01/18/24  0613   PHOS 5.7* 3.5     Magnesium: No results for input(s): \"MG\" in the last 72 hours.  Albumin:   Recent Labs     01/16/24  0837   LABALBU 3.4*     PTH                :No results found for: \"PTH\"           MEDICATIONS     Scheduled Meds:    insulin lispro  0-4 Units SubCUTAneous TID WC    insulin lispro  0-4 Units SubCUTAneous Nightly    lacosamide  100 mg IntraVENous BID    metoprolol succinate  100 mg Oral Daily    amiodarone  100 mg Oral QAM    aspirin  81 mg Oral Daily    atorvastatin  40 mg Oral QPM    clopidogrel  75 mg Oral Daily    famotidine  10 mg Oral QAM AC    budesonide-formoterol  2 puff Inhalation BID RT    furosemide  80 mg Oral Every Other Day    levothyroxine  25 mcg Oral QAM AC    lidocaine  1 patch Topical Daily    mirtazapine  15 mg Oral Nightly    mupirocin   Topical Daily    miconazole   Topical BID    polyethylene glycol  17 g Oral Daily    tiotropium

## 2024-01-18 NOTE — PROGRESS NOTES
MRI tech called writer. Patient unable to lay flat due to kyphosis. Unable to complete MRI at this time. Neurosurg NP made aware.

## 2024-01-18 NOTE — PROGRESS NOTES
Dialysis Post Treatment Note  Vitals:    01/18/24 1239   BP: (!) 166/64   Pulse: (!) 103   Resp: 15   Temp: 98.4 °F (36.9 °C)   SpO2: 96%     Pre-Weight = 62 kg  Post-weight = Weight - Scale: 61 kg (134 lb 7.7 oz)  Total Liters Processed = Blood Volume Processed (Liters): 60.97 L  Rinseback Volume (mL) = Rinseback Volume (ml): 300 ml  Net Removal (mL) =  1000  Patient's dry weight= TBD  Type of access used= tunneled cath  Length of treatment=210 min      PT tolerated tx well w/o issue. Pt maintained on monitor w/ stable vitals pre/intra/post.

## 2024-01-19 PROBLEM — D69.6 THROMBOCYTOPENIA (HCC): Status: ACTIVE | Noted: 2024-01-19

## 2024-01-19 PROBLEM — R79.89 ELEVATED LACTIC ACID LEVEL: Status: ACTIVE | Noted: 2024-01-19

## 2024-01-19 PROBLEM — R26.2 AMBULATORY DYSFUNCTION: Status: ACTIVE | Noted: 2024-01-19

## 2024-01-19 PROBLEM — E83.51 HYPOCALCEMIA: Status: ACTIVE | Noted: 2024-01-19

## 2024-01-19 PROBLEM — E11.65 UNCONTROLLED TYPE 2 DIABETES MELLITUS WITH HYPERGLYCEMIA (HCC): Status: ACTIVE | Noted: 2024-01-19

## 2024-01-19 LAB
ANION GAP SERPL CALCULATED.3IONS-SCNC: 9 MMOL/L (ref 9–17)
BASOPHILS # BLD: 0 K/UL (ref 0–0.2)
BASOPHILS NFR BLD: 0 % (ref 0–2)
BODY TEMPERATURE: 37
BUN SERPL-MCNC: 20 MG/DL (ref 8–23)
CALCIUM SERPL-MCNC: 8 MG/DL (ref 8.6–10.4)
CHLORIDE SERPL-SCNC: 104 MMOL/L (ref 98–107)
CO2 SERPL-SCNC: 27 MMOL/L (ref 20–31)
COHGB MFR BLD: 1.1 % (ref 0–5)
CREAT SERPL-MCNC: 2.1 MG/DL (ref 0.5–0.9)
EOSINOPHIL # BLD: 0 K/UL (ref 0–0.4)
EOSINOPHILS RELATIVE PERCENT: 0 % (ref 1–4)
ERYTHROCYTE [DISTWIDTH] IN BLOOD BY AUTOMATED COUNT: 17.2 % (ref 11.8–14.4)
FIO2 ON VENT: NORMAL %
GFR SERPL CREATININE-BSD FRML MDRD: 24 ML/MIN/1.73M2
GLUCOSE BLD-MCNC: 174 MG/DL (ref 65–105)
GLUCOSE BLD-MCNC: 197 MG/DL (ref 65–105)
GLUCOSE BLD-MCNC: 245 MG/DL (ref 65–105)
GLUCOSE BLD-MCNC: 339 MG/DL (ref 65–105)
GLUCOSE SERPL-MCNC: 238 MG/DL (ref 70–99)
HCO3 VENOUS: 25.8 MMOL/L (ref 24–30)
HCT VFR BLD AUTO: 32.1 % (ref 36.3–47.1)
HGB BLD-MCNC: 10.5 G/DL (ref 11.9–15.1)
IMM GRANULOCYTES # BLD AUTO: 0.13 K/UL (ref 0–0.3)
IMM GRANULOCYTES NFR BLD: 2 %
LACTIC ACID, WHOLE BLOOD: 3.3 MMOL/L (ref 0.7–2.1)
LYMPHOCYTES NFR BLD: 0.2 K/UL (ref 1–4.8)
LYMPHOCYTES RELATIVE PERCENT: 3 % (ref 24–44)
MCH RBC QN AUTO: 29.5 PG (ref 25.2–33.5)
MCHC RBC AUTO-ENTMCNC: 32.7 G/DL (ref 28.4–34.8)
MCV RBC AUTO: 90.2 FL (ref 82.6–102.9)
MONOCYTES NFR BLD: 0.26 K/UL (ref 0.1–0.8)
MONOCYTES NFR BLD: 4 % (ref 1–7)
MORPHOLOGY: ABNORMAL
NEUTROPHILS NFR BLD: 91 % (ref 36–66)
NEUTS SEG NFR BLD: 6.01 K/UL (ref 1.8–7.7)
NRBC BLD-RTO: 0 PER 100 WBC
O2 SAT, VEN: 85 % (ref 60–85)
PCO2, VEN: 43.2 MM HG (ref 39–55)
PH VENOUS: 7.39 (ref 7.32–7.42)
PLATELET # BLD AUTO: ABNORMAL K/UL (ref 138–453)
PLATELET, FLUORESCENCE: 107 K/UL (ref 138–453)
PLATELETS.RETICULATED NFR BLD AUTO: 6.1 % (ref 1.1–10.3)
PO2, VEN: 48 MM HG (ref 30–50)
POSITIVE BASE EXCESS, VEN: 1.3 MMOL/L (ref 0–2)
POTASSIUM SERPL-SCNC: 4.3 MMOL/L (ref 3.7–5.3)
RBC # BLD AUTO: 3.56 M/UL (ref 3.95–5.11)
SODIUM SERPL-SCNC: 140 MMOL/L (ref 135–144)
WBC OTHER # BLD: 6.6 K/UL (ref 3.5–11.3)

## 2024-01-19 PROCEDURE — 99232 SBSQ HOSP IP/OBS MODERATE 35: CPT | Performed by: INTERNAL MEDICINE

## 2024-01-19 PROCEDURE — 6360000002 HC RX W HCPCS: Performed by: INTERNAL MEDICINE

## 2024-01-19 PROCEDURE — 85055 RETICULATED PLATELET ASSAY: CPT

## 2024-01-19 PROCEDURE — 6370000000 HC RX 637 (ALT 250 FOR IP): Performed by: INTERNAL MEDICINE

## 2024-01-19 PROCEDURE — 97530 THERAPEUTIC ACTIVITIES: CPT

## 2024-01-19 PROCEDURE — APPSS15 APP SPLIT SHARED TIME 0-15 MINUTES: Performed by: NURSE PRACTITIONER

## 2024-01-19 PROCEDURE — 97162 PT EVAL MOD COMPLEX 30 MIN: CPT

## 2024-01-19 PROCEDURE — 83605 ASSAY OF LACTIC ACID: CPT

## 2024-01-19 PROCEDURE — 2060000000 HC ICU INTERMEDIATE R&B

## 2024-01-19 PROCEDURE — 2580000003 HC RX 258: Performed by: INTERNAL MEDICINE

## 2024-01-19 PROCEDURE — 85025 COMPLETE CBC W/AUTO DIFF WBC: CPT

## 2024-01-19 PROCEDURE — 6370000000 HC RX 637 (ALT 250 FOR IP): Performed by: STUDENT IN AN ORGANIZED HEALTH CARE EDUCATION/TRAINING PROGRAM

## 2024-01-19 PROCEDURE — 6370000000 HC RX 637 (ALT 250 FOR IP)

## 2024-01-19 PROCEDURE — 36415 COLL VENOUS BLD VENIPUNCTURE: CPT

## 2024-01-19 PROCEDURE — 99233 SBSQ HOSP IP/OBS HIGH 50: CPT | Performed by: PSYCHIATRY & NEUROLOGY

## 2024-01-19 PROCEDURE — 80048 BASIC METABOLIC PNL TOTAL CA: CPT

## 2024-01-19 PROCEDURE — C9254 INJECTION, LACOSAMIDE: HCPCS | Performed by: INTERNAL MEDICINE

## 2024-01-19 PROCEDURE — 99233 SBSQ HOSP IP/OBS HIGH 50: CPT | Performed by: NURSE PRACTITIONER

## 2024-01-19 PROCEDURE — 82947 ASSAY GLUCOSE BLOOD QUANT: CPT

## 2024-01-19 PROCEDURE — 99222 1ST HOSP IP/OBS MODERATE 55: CPT | Performed by: INTERNAL MEDICINE

## 2024-01-19 PROCEDURE — 82805 BLOOD GASES W/O2 SATURATION: CPT

## 2024-01-19 RX ORDER — DILTIAZEM HYDROCHLORIDE 60 MG/1
60 TABLET, FILM COATED ORAL EVERY 6 HOURS SCHEDULED
Status: DISCONTINUED | OUTPATIENT
Start: 2024-01-20 | End: 2024-01-23 | Stop reason: HOSPADM

## 2024-01-19 RX ORDER — DEXAMETHASONE 4 MG/1
TABLET ORAL
Qty: 25 TABLET | Refills: 0 | Status: ON HOLD | OUTPATIENT
Start: 2024-01-19 | End: 2024-02-09

## 2024-01-19 RX ORDER — INSULIN LISPRO 100 [IU]/ML
0-4 INJECTION, SOLUTION INTRAVENOUS; SUBCUTANEOUS NIGHTLY
Status: DISCONTINUED | OUTPATIENT
Start: 2024-01-19 | End: 2024-01-23 | Stop reason: HOSPADM

## 2024-01-19 RX ORDER — INSULIN LISPRO 100 [IU]/ML
0-8 INJECTION, SOLUTION INTRAVENOUS; SUBCUTANEOUS
Status: DISCONTINUED | OUTPATIENT
Start: 2024-01-19 | End: 2024-01-23 | Stop reason: HOSPADM

## 2024-01-19 RX ORDER — LACOSAMIDE 50 MG/1
50 TABLET ORAL
Status: DISCONTINUED | OUTPATIENT
Start: 2024-01-20 | End: 2024-01-23 | Stop reason: HOSPADM

## 2024-01-19 RX ADMIN — INSULIN LISPRO 3 UNITS: 100 INJECTION, SOLUTION INTRAVENOUS; SUBCUTANEOUS at 12:50

## 2024-01-19 RX ADMIN — HEPARIN SODIUM 5000 UNITS: 5000 INJECTION INTRAVENOUS; SUBCUTANEOUS at 10:24

## 2024-01-19 RX ADMIN — LACOSAMIDE 100 MG: 10 INJECTION INTRAVENOUS at 20:53

## 2024-01-19 RX ADMIN — SODIUM CHLORIDE, PRESERVATIVE FREE 10 ML: 5 INJECTION INTRAVENOUS at 20:46

## 2024-01-19 RX ADMIN — DILTIAZEM HYDROCHLORIDE 60 MG: 60 TABLET, FILM COATED ORAL at 15:12

## 2024-01-19 RX ADMIN — DEXAMETHASONE SODIUM PHOSPHATE 2 MG: 4 INJECTION, SOLUTION INTRA-ARTICULAR; INTRALESIONAL; INTRAMUSCULAR; INTRAVENOUS; SOFT TISSUE at 12:50

## 2024-01-19 RX ADMIN — CLOPIDOGREL BISULFATE 75 MG: 75 TABLET ORAL at 08:36

## 2024-01-19 RX ADMIN — FUROSEMIDE 80 MG: 40 TABLET ORAL at 08:43

## 2024-01-19 RX ADMIN — DEXAMETHASONE SODIUM PHOSPHATE 2 MG: 4 INJECTION, SOLUTION INTRA-ARTICULAR; INTRALESIONAL; INTRAMUSCULAR; INTRAVENOUS; SOFT TISSUE at 20:43

## 2024-01-19 RX ADMIN — CETIRIZINE HYDROCHLORIDE 5 MG: 10 TABLET ORAL at 08:42

## 2024-01-19 RX ADMIN — DILTIAZEM HYDROCHLORIDE 60 MG: 60 TABLET, FILM COATED ORAL at 05:21

## 2024-01-19 RX ADMIN — LEVOTHYROXINE SODIUM 25 MCG: 50 TABLET ORAL at 10:25

## 2024-01-19 RX ADMIN — MUPIROCIN: 20 OINTMENT TOPICAL at 08:41

## 2024-01-19 RX ADMIN — ANTI-FUNGAL POWDER MICONAZOLE NITRATE TALC FREE: 1.42 POWDER TOPICAL at 12:51

## 2024-01-19 RX ADMIN — SODIUM BICARBONATE 650 MG: 648 TABLET ORAL at 17:18

## 2024-01-19 RX ADMIN — ACETAMINOPHEN 650 MG: 325 TABLET ORAL at 10:24

## 2024-01-19 RX ADMIN — ASPIRIN 81 MG: 81 TABLET, COATED ORAL at 08:37

## 2024-01-19 RX ADMIN — METOPROLOL SUCCINATE 100 MG: 25 TABLET, EXTENDED RELEASE ORAL at 08:36

## 2024-01-19 RX ADMIN — DEXAMETHASONE SODIUM PHOSPHATE 2 MG: 4 INJECTION, SOLUTION INTRA-ARTICULAR; INTRALESIONAL; INTRAMUSCULAR; INTRAVENOUS; SOFT TISSUE at 05:21

## 2024-01-19 RX ADMIN — RISPERIDONE 1 MG: 1 TABLET, ORALLY DISINTEGRATING ORAL at 23:06

## 2024-01-19 RX ADMIN — MIRTAZAPINE 15 MG: 15 TABLET, FILM COATED ORAL at 20:57

## 2024-01-19 RX ADMIN — SODIUM CHLORIDE, PRESERVATIVE FREE 10 ML: 5 INJECTION INTRAVENOUS at 08:37

## 2024-01-19 RX ADMIN — AMIODARONE HYDROCHLORIDE 100 MG: 200 TABLET ORAL at 08:36

## 2024-01-19 RX ADMIN — ANTI-FUNGAL POWDER MICONAZOLE NITRATE TALC FREE: 1.42 POWDER TOPICAL at 20:42

## 2024-01-19 RX ADMIN — HYDROXYZINE HYDROCHLORIDE 50 MG: 50 TABLET ORAL at 08:43

## 2024-01-19 RX ADMIN — FAMOTIDINE 10 MG: 20 TABLET, FILM COATED ORAL at 08:36

## 2024-01-19 RX ADMIN — SODIUM BICARBONATE 650 MG: 648 TABLET ORAL at 08:43

## 2024-01-19 RX ADMIN — HEPARIN SODIUM 5000 UNITS: 5000 INJECTION INTRAVENOUS; SUBCUTANEOUS at 15:11

## 2024-01-19 RX ADMIN — ATORVASTATIN CALCIUM 40 MG: 40 TABLET, FILM COATED ORAL at 17:27

## 2024-01-19 RX ADMIN — SODIUM CHLORIDE: 9 INJECTION, SOLUTION INTRAVENOUS at 20:42

## 2024-01-19 RX ADMIN — HEPARIN SODIUM 5000 UNITS: 5000 INJECTION INTRAVENOUS; SUBCUTANEOUS at 23:06

## 2024-01-19 RX ADMIN — LACOSAMIDE 100 MG: 10 INJECTION INTRAVENOUS at 08:35

## 2024-01-19 RX ADMIN — SODIUM BICARBONATE 650 MG: 648 TABLET ORAL at 23:06

## 2024-01-19 ASSESSMENT — PAIN SCALES - GENERAL
PAINLEVEL_OUTOF10: 9
PAINLEVEL_OUTOF10: 0
PAINLEVEL_OUTOF10: 0

## 2024-01-19 ASSESSMENT — PAIN DESCRIPTION - ORIENTATION: ORIENTATION: ANTERIOR

## 2024-01-19 ASSESSMENT — PAIN DESCRIPTION - DESCRIPTORS: DESCRIPTORS: ACHING;DISCOMFORT

## 2024-01-19 ASSESSMENT — PAIN DESCRIPTION - LOCATION: LOCATION: HEAD

## 2024-01-19 NOTE — CARE COORDINATION
Transitional Planning  Met with Daughters Svitlana and Sea to discuss OT recommendations of SNF.  Provided above with Choice list with star ratings and QR code, explained.  [facility]  E faxed referral.  Await additional choices.    [address] Jesus from [facility] called declining.

## 2024-01-19 NOTE — PROGRESS NOTES
Neurosurgery TIFFANY/Resident    Daily Progress Note   CC:No chief complaint on file.    1/19/2024  6:02 AM    Chart reviewed.  No acute events overnight.  No new complaints. Resting in bed, somnolent , was more alert on consult note from 1/17   Vitals:    01/18/24 2359 01/19/24 0000 01/19/24 0400 01/19/24 0410   BP: 125/61   (!) 115/50   Pulse: 79 77 69 72   Resp: 18   16   Temp: 97.8 °F (36.6 °C)   97 °F (36.1 °C)   TempSrc: Axillary   Axillary   SpO2: 95%   92%   Weight:           PE:   Somnolent   Does not open eyes to voice or stimulation  Does not follow commands  Mumbles a little   Moving extremities some what   E1 V2 M5 (8)      Lab Results   Component Value Date    WBC 6.6 01/19/2024    HGB 10.5 (L) 01/19/2024    HCT 32.1 (L) 01/19/2024    PLT See Reflexed IPF Result 01/19/2024    CHOL 116 09/19/2022    TRIG 144 09/19/2022    HDL 46 09/19/2022    ALT 9 01/13/2024    AST 13 01/13/2024     01/19/2024    K 4.3 01/19/2024     01/19/2024    CREATININE 2.1 (H) 01/19/2024    BUN 20 01/19/2024    CO2 27 01/19/2024    TSH 1.11 01/17/2024    INR 1.0 12/31/2023    LABA1C 5.5 10/16/2023    CRP <3.0 08/18/2023    SEDRATE 7 10/17/2017             A/P  73 y.o. female who presents with altered mental status, Meningioma     Patient was seen by cardiology- recent stent placement 11/27/23 on DAPT   Neuro checks per floor protocol  Ok to continue patients home aspirin and plavix   Will likely need surgical resection possible as elective case      Please contact neurosurgery with any changes in patients neurologic status.       Sergio Molina, CNP  1/19/24  6:02 AM

## 2024-01-19 NOTE — PROGRESS NOTES
of colon cancer    Family history of pancreatic cancer    Mild malnutrition (HCC)    Decreased strength, endurance, and mobility    Lymphedema    PVD (peripheral vascular disease) (HCC)    Elevated troponin    ESRD on dialysis (HCC)    Hypertensive heart and chronic kidney disease with heart failure and with stage 5 chronic kidney disease, or end stage renal disease (HCC)    Gastroesophageal reflux disease    Moderate anxiety    Other age-related cataract    History of GI bleed    Chronic combined systolic and diastolic CHF (congestive heart failure) (HCC)    Degenerative disease of nervous system, unspecified (HCC)    Allergic rhinitis    Acquired hypothyroidism    Type 2 diabetes mellitus with polyneuropathy (HCC)    Chronic respiratory failure with hypoxia (HCC)    Other osteoporosis without current pathological fracture    Moderate vascular dementia with anxiety (HCC)    Recurrent falls while walking    Persistent atrial fibrillation (HCC)    Slow transit constipation    Right temporal lobe mass    History of type 2 diabetes mellitus    History of chronic atrial fibrillation    Encephalopathy acute    Bacteremia    Actinomyces infection    ESRD (end stage renal disease) on dialysis (HCC)    Other seizures    Pressure ulcer of coccygeal region, stage III (HCC)    Bilateral pleural effusion    CAD S/P percutaneous coronary angioplasty    H/O heart artery stent    Abnormal echocardiogram    Mixed hyperlipidemia    Sepsis due to urinary tract infection (HCC)    Renovascular hypertension    Goals of care, counseling/discussion    DNR (do not resuscitate) discussion    ACP (advance care planning)    Palliative care encounter    Acute cystitis with hematuria    Permanent atrial fibrillation (HCC)    Ischemic cardiomyopathy    History of end stage renal disease    Acute encephalopathy    Vasogenic cerebral edema (HCC)    Mass of temporoparietal region of brain    Encephalopathy    Delirium due to multiple etiologies     Cognitive impairment    Hallucinations    Preop cardiovascular exam    ESRD (end stage renal disease) (HCC)       Palliative Interaction: Reviewed medical record, updates per bedside nurse Greyson.      Went to visit patient and family at bedside.  Two of patient's 4 children are at bedside.  Very attentive to their mother.  Pt is awake and talking.  Statements and questions do not make sense.      Introduced myself and my role.  I explained what palliative care can do inpatient and outpatient.  Pt qualifies for palliative care.  Explained that palliative care can take place at a nursing facility or at home.  She can have palliative care if she goes home with C or if she goes to SNF for rehab.      Family expresses concern for why patient is so confused.  They express concerns she may have to have surgery.  They are hoping to take her home with [facility], but are looking at SNF's as well.  Answered questions.  Offered support.      Palliative care will continue to follow.      Goals/Plan of care  Education/support to staff  Education/support to family  Education/support to patient  Communications with primary service  Providing support for coping/adaptation/distress of family  Providing support for coping/adaptation/distress of patient  Palliative care orders introduced  Explained palliative care services, offered outpatient palliative care services.   Palliative care will continue to follow for support and goals of care.       Palliative Care Coordinator  Marge TRAORE, RN, ONN-CG    [place] Office: [phone]   [place] Office: [phone]  [facility] Office: [phone]    For [facility] scheduling please call [phone]

## 2024-01-19 NOTE — PLAN OF CARE
Anesthesia ROS/Med Hx    Overall Review:  Pts. EKG was reviewed   Pt. has no history of anesthetic complications  Pt. does not have difficult airway    Pulmonary Review:    Negative for pulmonary    Neuro/Psych Review:    Negative for all neuro/psych ROS    Cardiovascular Review:    Pt. positive for hypertension  Pt. positive for hyperlipidemia    GI/HEPATIC/RENAL Review:    Pt. positive for GERD    End/Other Review:    Pt. negative for endo/other ROS  Overall Review of Systems Comments:    HTN (hypertension)                                            HLD (hyperlipidemia)                                          GERD (gastroesophageal reflux disease)                        Dyspnea                                                       Age-related macular degeneration                                Comment: OU    Asbestosis (CMS/HCC)                                          Esophageal reflux                                               DEXA BONE DENSITY AXIAL SKELETON                10/23/2008    COLONOSCOPY W/ POLYPECTOMY                      07/31/2009    ESOPHAGOGASTRODUODENOSCOPY                      06/18/2003    CYSTOCELE REPAIR                                2009          HYSTERECTOMY                                                  I have reviewed the patient's medical record in detail.    Atilio Serrano CRNA      Anesthesia Plan  ASA Status: 3  Anesthesia Type: Spinal  Induction: Intravenous  Reviewed: Consultations, Pre-Induction Reassessment, Patient Summary, Beta Blocker Status, DNR Status, NPO Status, Allergies, Medications, Problem List, Past Med History, Lab Results and EKG  The proposed anesthetic plan, including its risks and benefits, have been discussed with the Patient - along with the risks and benefits of alternatives.  Questions were encouraged and answered and the patient and/or representative agrees to proceed.  Blood Products: Not Anticipated      Physical Exam  Mallampati: II  TM  Neurosurgery update    Patient can follow up in the office in 2 weeks with DR Lani Saenz 2mg every 8 hours x1 week then taper to 2mg BID until follow up     Electronically signed by MAL Patel NP on 1/19/2024 at 12:06 PM     Distance: >3 FB  Neck ROM: Full  Cardio Rhythm: Regular  Cardio Rate: Normal  cardiovascular exam normal  pulmonary exam normal  abdominal exam normal  dental exam normal

## 2024-01-19 NOTE — PROGRESS NOTES
Diverticulosis     COVID-19 vaccination declined     Acute on chronic systolic congestive heart failure (HCC)     Chronic venous stasis dermatitis of both lower extremities     Family history of colon cancer     Family history of pancreatic cancer     Mild malnutrition (HCC)     Decreased strength, endurance, and mobility     Lymphedema     PVD (peripheral vascular disease) (HCC)     Elevated troponin     ESRD on dialysis (HCC)     Hypertensive heart and chronic kidney disease with heart failure and with stage 5 chronic kidney disease, or end stage renal disease (HCC)     Gastroesophageal reflux disease     Moderate anxiety     Other age-related cataract     History of GI bleed     Chronic combined systolic and diastolic CHF (congestive heart failure) (HCC)     Degenerative disease of nervous system, unspecified (HCC)     Allergic rhinitis     Acquired hypothyroidism     Type 2 diabetes mellitus with polyneuropathy (HCC)     Chronic respiratory failure with hypoxia (HCC)     Other osteoporosis without current pathological fracture     Moderate vascular dementia with anxiety (HCC)     Recurrent falls while walking     Persistent atrial fibrillation (HCC)     Slow transit constipation     Right temporal lobe mass     History of type 2 diabetes mellitus     History of chronic atrial fibrillation     Encephalopathy acute     Bacteremia     Actinomyces infection     ESRD (end stage renal disease) on dialysis (HCC)     Other seizures     Pressure ulcer of coccygeal region, stage III (HCC)     Bilateral pleural effusion     CAD S/P percutaneous coronary angioplasty     H/O heart artery stent     Abnormal echocardiogram     Mixed hyperlipidemia     Sepsis due to urinary tract infection (HCC)     Renovascular hypertension     Goals of care, counseling/discussion     DNR (do not resuscitate) discussion     ACP (advance care planning)     Palliative care encounter     Acute cystitis with hematuria     Permanent atrial

## 2024-01-19 NOTE — CONSULTS
[facility]  Office: [phone]  Arya Prasad DO, Jim Sesay DO, Harlan More DO, Nikolay Cummings DO, Sky Washington MD, Adina Lara MD, Raquel Morales MD, Tonia Rosado MD,  Compa Kessler MD, Lonny Boles MD, Trung De Dios MD,  Rosy Blackmon DO, Sole Luna MD, Berry Miller MD, Saúl Prasad DO, Jessie Jeffers MD,  Ced Dixon DO, Sharyn Bae MD, Wendy Monsalve MD, Reta Cisneros MD, Reji Baptiste MD,  Brandon Molina MD, Ventura Hill MD, Brooke Matos MD, Gordo Boyle MD, Fausto Joya MD, Amber Riddle MD, Jacob Rich DO, Hamlet Ozuna DO, Kortney Blackwood MD,  Ba Murdock MD, Shirley Waterhouse, CNP,  Elizabeth Omalley, CNP, Chuck Dougherty, CNP,  Jaqueline Inman, WENDY, Kim Santizo, CNP, María Elena Torres, CNP, Jessenia Middleton, CNP, Sissy Gonzalez, CNP, Rita Gutierrez, CNP, Vivi De Jesus, PA-C, Juliana Munoz, PA-C, Patti Uriarte, CNP, Rashmi Cameron, CNS, Yesenia Solomon, CNP, July Jain, CNP, Tracy Schwab, CNP         Internal Medicine Consultation        [facility]      Lupe Ojeda  [id number]  1/19/2024  Reason for Consult:  Medical Management  Elevated Lactic Acid    Requesting Physician:  Bushra Palmer DO PCP - Kayleigh Cardoso MD    The patient has been examined and the chart was reviewed.    As documented in the medical record:  \"The patient is a 73 y.o. who was admitted to the hospital for the management of  AMS   Patient presented as a direct transfer from [place]. History was obtained from electronic medical records since patient is delirious and is not able to provide any meaningful history.  She has extensive PMH including CAD s/p CABG, CHF with reduced EF, paroxysmal A-fib (on anticoagulation), ESRD (on hemodialysis), bilateral cataract, DM type II, hypothyroidism, HTN, COPD.  As per medical chart, patient had frequent episodes of AMS, agitations and hallucinations.  There is also a suspicion for

## 2024-01-19 NOTE — PROGRESS NOTES
[facility] Neurology   IN-PATIENT SERVICE  General Neurology Progress Note   [facility]                Date:   1/19/2024  Patient name:  Lupe Ojeda  Date of admission:  1/16/2024 11:30 PM  MRN:   [id number]  Account:  [id number]  YOB: 1950  PCP:    Kayleigh Cardoso MD  Room:   [address] [place] [address]  Code Status:    Full Code  Chief Complaint: No chief complaint on file.      Interval history      The patient was seen and examined at bedside this morning.  Patient was lying in bed. She was very tearful but was more alert and interactive than prior days. She repeatedly asks if bedside nurse and writer can help her with her \"journey\" which it is unclear what the patient is trying to communicate. Although she was able to follow commands by showing a thumbs up when prompted.    Patient deemed moderate to high risk for intervention by cardiology but will need further clarification on planned procedure.    Labs, chart, and VS reviewed. No acute events overnight.     Brief History     Lupe Ojeda is a 73 y.o. 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  Patient was having waxing and waning mental status with associated hallucinations.  MRI and CT show no acute intracranial abnormalities, just stable meningioma in the right temporal lobe.  On exam, patient was lethargic, unable to answer questions, but was able to follow simple commands such as squeezing fingers and wiggling toes.  Patient was started on LTME to evaluate for possible seizure etiology which is shown occasional right parietal and occipital sharps and diffuse  Symmetric to touch in all extremities bilaterally   Cerebellar No dysmetria or dysdiadochocinesia    Reflex function DTR:        2+ b/l symmetric in biceps, brachioradialis, patellar, calcaneal  Babinski b/l plantar downgoing   Gait                  Not assessed          Investigations   Laboratory Testing:  Recent Results (from the past 24 hour(s))   POC Glucose Fingerstick    Collection Time: 01/18/24  3:52 PM   Result Value Ref Range    POC Glucose 175 (H) 65 - 105 mg/dL   POC Glucose Fingerstick    Collection Time: 01/18/24  9:16 PM   Result Value Ref Range    POC Glucose 220 (H) 65 - 105 mg/dL   CBC with Auto Differential    Collection Time: 01/19/24  2:23 AM   Result Value Ref Range    WBC 6.6 3.5 - 11.3 k/uL    RBC 3.56 (L) 3.95 - 5.11 m/uL    Hemoglobin 10.5 (L) 11.9 - 15.1 g/dL    Hematocrit 32.1 (L) 36.3 - 47.1 %    MCV 90.2 82.6 - 102.9 fL    MCH 29.5 25.2 - 33.5 pg    MCHC 32.7 28.4 - 34.8 g/dL    RDW 17.2 (H) 11.8 - 14.4 %    Platelets See Reflexed IPF Result 138 - 453 k/uL    Platelet, Fluorescence 107 (L) 138 - 453 k/uL    Platelet, Immature Fraction 6.1 1.1 - 10.3 %    NRBC Automated 0.0 0.0 per 100 WBC    Immature Granulocytes 2 (H) 0 %    Neutrophils % 91 (H) 36 - 66 %    Lymphocytes % 3 (L) 24 - 44 %    Monocytes % 4 1 - 7 %    Eosinophils % 0 (L) 1 - 4 %    Basophils % 0 0 - 2 %    Absolute Immature Granulocyte 0.13 0.00 - 0.30 k/uL    Neutrophils Absolute 6.01 1.8 - 7.7 k/uL    Lymphocytes Absolute 0.20 (L) 1.0 - 4.8 k/uL    Monocytes Absolute 0.26 0.1 - 0.8 k/uL    Eosinophils Absolute 0.00 0.0 - 0.4 k/uL    Basophils Absolute 0.00 0.0 - 0.2 k/uL    Morphology ANISOCYTOSIS PRESENT    Basic Metabolic Panel w/ Reflex to MG    Collection Time: 01/19/24  2:23 AM   Result Value Ref Range    Sodium 140 135 - 144 mmol/L    Potassium 4.3 3.7 - 5.3 mmol/L    Chloride 104 98 - 107 mmol/L    CO2 27 20 - 31 mmol/L    Anion Gap 9 9 - 17 mmol/L    Glucose 238 (H) 70 - 99 mg/dL    BUN 20 8 - 23 mg/dL

## 2024-01-19 NOTE — PROGRESS NOTES
STARTING 1 DAY BEFORE SURGERY  mirtazapine (REMERON) 15 MG tablet, Take 1 tablet by mouth nightly Dose increased 11/17/2023 . 30 days RX till the mail order comes  mupirocin (BACTROBAN) 2 % ointment, Apply topically 3 times daily x 10 days.  famotidine (PEPCID) 20 MG tablet, Take 1 tablet by mouth every morning (before breakfast) Replacing omeprazole due to end-stage kidney disease  lamoTRIgine (LAMICTAL) 25 MG tablet, Take 1 tablet by mouth 2 times daily  atorvastatin (LIPITOR) 40 MG tablet, Take 1 tablet by mouth every evening Take 1 tablet by mouth once daily  melatonin 5 MG TABS tablet, Take 1 tablet by mouth every evening For insomnia  fluticasone-salmeterol (ADVAIR HFA) 115-21 MCG/ACT inhaler, Inhale 2 puffs into the lungs 2 times daily Maintenance inhaler  tiotropium (SPIRIVA RESPIMAT) 2.5 MCG/ACT AERS inhaler, Inhale 2 puffs into the lungs every morning (before breakfast)  lidocaine (LIDODERM) 5 %, Place 1 patch onto the skin daily 12 hours on, 12 hours off.  lidocaine (LIDOCAINE PAIN RELIEF) 4 % external patch, 1 patch as needed Externally Once a day  Wound Dressings (ZENIFOAM GENTLE 9\"X9\"/SACRAL) PADS, Apply 1 each topically daily  nystatin (MYCOSTATIN) 238242 UNIT/GM powder, Apply 3 times daily.  Lift Chair [facility], by Does not apply route  metoprolol succinate (TOPROL XL) 25 MG extended release tablet, Take 1 tablet by mouth every evening Hold if BP bellow 115/65. Stop metoprolol tartrate  hydrOXYzine HCl (ATARAX) 50 MG tablet, Take 1 tablet by mouth daily as needed for Anxiety  albuterol (PROVENTIL) (2.5 MG/3ML) 0.083% nebulizer solution, Take 3 mLs by nebulization every 6 hours as needed for Wheezing or Shortness of Breath  polyethylene glycol (MIRALAX) 17 GM/SCOOP powder, Take 17 g by mouth daily  lidocaine (LMX) 4 % cream, 5 g topical 2-3 times a day as needed for pain, maximum 20 g/day  albuterol sulfate HFA (PROAIR HFA) 108 (90 Base) MCG/ACT inhaler, Inhale 2 puffs into the lungs every 6 hours as  CABG  Hypothyroidism on synthroid  Chronic combined systolic/diastolic CHF  Afib w/ RVR, on PO amiodarone, Cardizem, and beta blocker  COPD, not in exacerbation    Plan:   1. Strict I/O Record  2. Daily Weights  3. Daily CBC and BMP  4. Will follow    Nutrition   Please ensure that patient is on a renal diet/TF. Avoid nephrotoxic drugs/contrast exposure.    We will continue to follow along with you.     Electronically signed by MAL Mejias CNP on 1/19/2024 at 12:35 PM    Attending Physician Statement  I have discussed the care of Lupe Ojeda, including pertinent history and exam findings with the CNP. I have reviewed the key elements of all parts of the encounter with the CNP. I have seen and examined the patient. I agree with the assessment and plan and status of the problem list as documented.  Addiitionally I recommend next hemodialysis tomorrow.    Rogelio Judd MD   Nephrology Attending Physician  [facility]  1/19/2024

## 2024-01-19 NOTE — PROGRESS NOTES
training, Safety education & training, Home exercise program, Therapeutic activities, Patient/Caregiver education & training, Equipment evaluation, education, & procurement, Endurance training, Positioning  Safety Devices  Type of Devices: Bed alarm in place, Left in bed, Nurse notified, Patient at risk for falls, All fall risk precautions in place, Call light within reach  Restraints  Restraints Initially in Place: No     Restrictions  Restrictions/Precautions  Restrictions/Precautions: Contact Precautions, Fall Risk  Required Braces or Orthoses?: No  Position Activity Restriction  Other position/activity restrictions: Up w/ assist     Subjective   General  Patient assessed for rehabilitation services?: Yes  Response To Previous Treatment: Not applicable  Family / Caregiver Present: No  Follows Commands: Impaired (follows ~5% of simple commands with repetition this date.)  Subjective  Subjective: Pt supine in bed upon therapist arrival, RN agreeable to therapy.  Pt lethargic, frequently making statements irrelevant to the situation.  Pt gives no indication of increased pain this date.         Social/Functional History  Social/Functional History  Lives With: Son  Type of Home: House  Home Layout: Two level, Performs ADL's on one level, Able to Live on Main level with bedroom/bathroom, 1/2 bath on main level  Home Access: Ramped entrance  Bathroom Toilet: Bedside commode  Bathroom Equipment: 3-in-1 commode  Home Equipment: Oxygen, Walker, rolling, Lift chair, Cane  Receives Help From: Family  ADL Assistance: Needs assistance  Homemaking Assistance: Needs assistance  Homemaking Responsibilities: No  Ambulation Assistance: Non-ambulatory  Transfer Assistance: Needs assistance  Active : No  Patient's  Info: son or daughter  Occupation: Retired  Additional Comments: Above information from [facility] admission. Writer unable to clarify and/or obtain more information secondary to pt cognitve status  this date  Vision/Hearing  Vision  Vision: Impaired (KATINA d/t cognition.)  Hearing  Hearing:  (KATINA d/t cogntion.)    Cognition   Cognition  Overall Cognitive Status: Exceptions  Arousal/Alertness: Inconsistent responses to stimuli  Following Commands: Inconsistently follows commands  Attention Span: Unable to maintain attention;Difficulty attending to directions  Safety Judgement: Decreased awareness of need for assistance;Decreased awareness of need for safety  Problem Solving: Decreased awareness of errors;Assistance required to correct errors made;Assistance required to identify errors made  Insights: Not aware of deficits  Initiation: Requires cues for all  Sequencing: Requires cues for all     Objective                 PROM RLE (degrees)  RLE PROM: WFL  AROM RLE (degrees)  RLE AROM: WFL  RLE General AROM: AAROM WFL  PROM LLE (degrees)  LLE PROM: WFL  AROM LLE (degrees)  LLE AROM : WFL  LLE General AROM: AAROM WFL  Strength RLE  Strength RLE: Exception  Comment: Difficult to formally assess d/t cognition, grossly 2/5 based on functional mobility.  Strength LLE  Strength LLE: Exception  Comment: Difficult to formally assess d/t cognition, grossly 2/5 based on functional mobility.           Bed mobility  Supine to Sit: Maximum assistance (assistance for BLE and trunk progression.)  Sit to Supine: Maximum assistance (assistance for BLE and trunk progression.)  Scooting: Maximal assistance  Bed Mobility Comments: HOB elevated ~45 degrees, max verbal and tactile cues for initiation and sequencing.  Transfers  Comment: unsafe to attempt d/t poor sitting balance, unable to follow many commands.        Balance  Posture: Poor  Sitting - Static: Poor  Sitting - Dynamic: Poor  Comments: sitting balance assessed at the EOB.  Exercise Treatment: BLE gastroc stretch x30 sec                                                 AM-PAC - Mobility    AM-PAC Basic Mobility - Inpatient   How much help is needed turning from your back to

## 2024-01-19 NOTE — PLAN OF CARE
Problem: Safety - Adult  Goal: Free from fall injury  Outcome: Progressing     Problem: Chronic Conditions and Co-morbidities  Goal: Patient's chronic conditions and co-morbidity symptoms are monitored and maintained or improved  Outcome: Progressing     Problem: Discharge Planning  Goal: Discharge to home or other facility with appropriate resources  Outcome: Progressing     Problem: Skin/Tissue Integrity  Goal: Absence of new skin breakdown  Description: 1.  Monitor for areas of redness and/or skin breakdown  2.  Assess vascular access sites hourly  3.  Every 4-6 hours minimum:  Change oxygen saturation probe site  4.  Every 4-6 hours:  If on nasal continuous positive airway pressure, respiratory therapy assess nares and determine need for appliance change or resting period.  Outcome: Progressing     Problem: Respiratory - Adult  Goal: Achieves optimal ventilation and oxygenation  Outcome: Progressing     Problem: Pain  Goal: Verbalizes/displays adequate comfort level or baseline comfort level  Outcome: Progressing

## 2024-01-20 ENCOUNTER — APPOINTMENT (OUTPATIENT)
Dept: CT IMAGING | Age: 74
DRG: 080 | End: 2024-01-20
Attending: PSYCHIATRY & NEUROLOGY
Payer: MEDICARE

## 2024-01-20 ENCOUNTER — APPOINTMENT (OUTPATIENT)
Dept: GENERAL RADIOLOGY | Age: 74
DRG: 080 | End: 2024-01-20
Attending: PSYCHIATRY & NEUROLOGY
Payer: MEDICARE

## 2024-01-20 LAB
ANION GAP SERPL CALCULATED.3IONS-SCNC: 12 MMOL/L (ref 9–16)
BASOPHILS # BLD: 0 K/UL (ref 0–0.2)
BASOPHILS NFR BLD: 0 % (ref 0–2)
BUN SERPL-MCNC: 38 MG/DL (ref 8–23)
CALCIUM SERPL-MCNC: 7.7 MG/DL (ref 8.6–10.4)
CHLORIDE SERPL-SCNC: 107 MMOL/L (ref 98–107)
CO2 SERPL-SCNC: 22 MMOL/L (ref 20–31)
CREAT SERPL-MCNC: 3 MG/DL (ref 0.5–0.9)
CRP SERPL HS-MCNC: <3 MG/L (ref 0–5)
EOSINOPHIL # BLD: 0 K/UL (ref 0–0.4)
EOSINOPHILS RELATIVE PERCENT: 0 % (ref 1–4)
ERYTHROCYTE [DISTWIDTH] IN BLOOD BY AUTOMATED COUNT: 17.4 % (ref 11.8–14.4)
EST. AVERAGE GLUCOSE BLD GHB EST-MCNC: 140 MG/DL
GFR SERPL CREATININE-BSD FRML MDRD: 16 ML/MIN/1.73M2
GLUCOSE BLD-MCNC: 105 MG/DL (ref 65–105)
GLUCOSE BLD-MCNC: 201 MG/DL (ref 65–105)
GLUCOSE BLD-MCNC: 95 MG/DL (ref 65–105)
GLUCOSE SERPL-MCNC: 245 MG/DL (ref 74–99)
HBA1C MFR BLD: 6.5 % (ref 4–6)
HCT VFR BLD AUTO: 37.6 % (ref 36.3–47.1)
HGB BLD-MCNC: 11 G/DL (ref 11.9–15.1)
IMM GRANULOCYTES # BLD AUTO: 0.07 K/UL (ref 0–0.3)
IMM GRANULOCYTES NFR BLD: 1 %
LYMPHOCYTES NFR BLD: 0.68 K/UL (ref 1–4.8)
LYMPHOCYTES RELATIVE PERCENT: 10 % (ref 24–44)
MCH RBC QN AUTO: 29.6 PG (ref 25.2–33.5)
MCHC RBC AUTO-ENTMCNC: 29.3 G/DL (ref 28.4–34.8)
MCV RBC AUTO: 101.1 FL (ref 82.6–102.9)
MONOCYTES NFR BLD: 0.27 K/UL (ref 0.1–0.8)
MONOCYTES NFR BLD: 4 % (ref 1–7)
MORPHOLOGY: ABNORMAL
NEUTROPHILS NFR BLD: 85 % (ref 36–66)
NEUTS SEG NFR BLD: 5.78 K/UL (ref 1.8–7.7)
NRBC BLD-RTO: 0 PER 100 WBC
PLATELET # BLD AUTO: ABNORMAL K/UL (ref 138–453)
PLATELET, FLUORESCENCE: 100 K/UL (ref 138–453)
PLATELETS.RETICULATED NFR BLD AUTO: 4.8 % (ref 1.1–10.3)
POTASSIUM SERPL-SCNC: 4.1 MMOL/L (ref 3.7–5.3)
PROCALCITONIN SERPL-MCNC: 0.16 NG/ML
RBC # BLD AUTO: 3.72 M/UL (ref 3.95–5.11)
SODIUM SERPL-SCNC: 141 MMOL/L (ref 136–145)
WBC OTHER # BLD: 6.8 K/UL (ref 3.5–11.3)

## 2024-01-20 PROCEDURE — 70450 CT HEAD/BRAIN W/O DYE: CPT

## 2024-01-20 PROCEDURE — 2060000000 HC ICU INTERMEDIATE R&B

## 2024-01-20 PROCEDURE — 6360000002 HC RX W HCPCS: Performed by: INTERNAL MEDICINE

## 2024-01-20 PROCEDURE — 6370000000 HC RX 637 (ALT 250 FOR IP): Performed by: INTERNAL MEDICINE

## 2024-01-20 PROCEDURE — 90935 HEMODIALYSIS ONE EVALUATION: CPT | Performed by: INTERNAL MEDICINE

## 2024-01-20 PROCEDURE — 6370000000 HC RX 637 (ALT 250 FOR IP)

## 2024-01-20 PROCEDURE — 94640 AIRWAY INHALATION TREATMENT: CPT

## 2024-01-20 PROCEDURE — 86140 C-REACTIVE PROTEIN: CPT

## 2024-01-20 PROCEDURE — 84145 PROCALCITONIN (PCT): CPT

## 2024-01-20 PROCEDURE — 85055 RETICULATED PLATELET ASSAY: CPT

## 2024-01-20 PROCEDURE — 90935 HEMODIALYSIS ONE EVALUATION: CPT

## 2024-01-20 PROCEDURE — 99232 SBSQ HOSP IP/OBS MODERATE 35: CPT | Performed by: FAMILY MEDICINE

## 2024-01-20 PROCEDURE — C9113 INJ PANTOPRAZOLE SODIUM, VIA: HCPCS | Performed by: FAMILY MEDICINE

## 2024-01-20 PROCEDURE — 99233 SBSQ HOSP IP/OBS HIGH 50: CPT | Performed by: SURGERY

## 2024-01-20 PROCEDURE — 94761 N-INVAS EAR/PLS OXIMETRY MLT: CPT

## 2024-01-20 PROCEDURE — C9254 INJECTION, LACOSAMIDE: HCPCS | Performed by: INTERNAL MEDICINE

## 2024-01-20 PROCEDURE — 6370000000 HC RX 637 (ALT 250 FOR IP): Performed by: PSYCHIATRY & NEUROLOGY

## 2024-01-20 PROCEDURE — 99232 SBSQ HOSP IP/OBS MODERATE 35: CPT | Performed by: PSYCHIATRY & NEUROLOGY

## 2024-01-20 PROCEDURE — 2580000003 HC RX 258: Performed by: FAMILY MEDICINE

## 2024-01-20 PROCEDURE — 82947 ASSAY GLUCOSE BLOOD QUANT: CPT

## 2024-01-20 PROCEDURE — 85025 COMPLETE CBC W/AUTO DIFF WBC: CPT

## 2024-01-20 PROCEDURE — 6360000002 HC RX W HCPCS: Performed by: FAMILY MEDICINE

## 2024-01-20 PROCEDURE — 2580000003 HC RX 258: Performed by: INTERNAL MEDICINE

## 2024-01-20 PROCEDURE — 83036 HEMOGLOBIN GLYCOSYLATED A1C: CPT

## 2024-01-20 PROCEDURE — 36415 COLL VENOUS BLD VENIPUNCTURE: CPT

## 2024-01-20 PROCEDURE — 6370000000 HC RX 637 (ALT 250 FOR IP): Performed by: FAMILY MEDICINE

## 2024-01-20 PROCEDURE — 71045 X-RAY EXAM CHEST 1 VIEW: CPT

## 2024-01-20 PROCEDURE — 80048 BASIC METABOLIC PNL TOTAL CA: CPT

## 2024-01-20 PROCEDURE — 2500000003 HC RX 250 WO HCPCS: Performed by: INTERNAL MEDICINE

## 2024-01-20 PROCEDURE — 6370000000 HC RX 637 (ALT 250 FOR IP): Performed by: STUDENT IN AN ORGANIZED HEALTH CARE EDUCATION/TRAINING PROGRAM

## 2024-01-20 RX ORDER — INSULIN GLARGINE 100 [IU]/ML
10 INJECTION, SOLUTION SUBCUTANEOUS DAILY
Status: DISCONTINUED | OUTPATIENT
Start: 2024-01-20 | End: 2024-01-23 | Stop reason: HOSPADM

## 2024-01-20 RX ORDER — DEXTROSE MONOHYDRATE 100 MG/ML
INJECTION, SOLUTION INTRAVENOUS CONTINUOUS PRN
Status: DISCONTINUED | OUTPATIENT
Start: 2024-01-20 | End: 2024-01-23 | Stop reason: HOSPADM

## 2024-01-20 RX ADMIN — SODIUM CHLORIDE, PRESERVATIVE FREE 40 MG: 5 INJECTION INTRAVENOUS at 17:20

## 2024-01-20 RX ADMIN — ATORVASTATIN CALCIUM 40 MG: 40 TABLET, FILM COATED ORAL at 16:33

## 2024-01-20 RX ADMIN — METOPROLOL SUCCINATE 100 MG: 25 TABLET, EXTENDED RELEASE ORAL at 09:09

## 2024-01-20 RX ADMIN — ANTI-FUNGAL POWDER MICONAZOLE NITRATE TALC FREE: 1.42 POWDER TOPICAL at 09:13

## 2024-01-20 RX ADMIN — HEPARIN SODIUM 5000 UNITS: 5000 INJECTION INTRAVENOUS; SUBCUTANEOUS at 16:33

## 2024-01-20 RX ADMIN — LACOSAMIDE 100 MG: 10 INJECTION INTRAVENOUS at 21:35

## 2024-01-20 RX ADMIN — DEXAMETHASONE SODIUM PHOSPHATE 2 MG: 4 INJECTION, SOLUTION INTRA-ARTICULAR; INTRALESIONAL; INTRAMUSCULAR; INTRAVENOUS; SOFT TISSUE at 04:16

## 2024-01-20 RX ADMIN — LACOSAMIDE 50 MG: 50 TABLET, FILM COATED ORAL at 16:33

## 2024-01-20 RX ADMIN — ASPIRIN 81 MG: 81 TABLET, COATED ORAL at 09:09

## 2024-01-20 RX ADMIN — DEXAMETHASONE SODIUM PHOSPHATE 2 MG: 4 INJECTION, SOLUTION INTRA-ARTICULAR; INTRALESIONAL; INTRAMUSCULAR; INTRAVENOUS; SOFT TISSUE at 21:35

## 2024-01-20 RX ADMIN — CETIRIZINE HYDROCHLORIDE 5 MG: 10 TABLET ORAL at 09:14

## 2024-01-20 RX ADMIN — MUPIROCIN: 20 OINTMENT TOPICAL at 09:16

## 2024-01-20 RX ADMIN — BUDESONIDE AND FORMOTEROL FUMARATE DIHYDRATE 2 PUFF: 160; 4.5 AEROSOL RESPIRATORY (INHALATION) at 08:22

## 2024-01-20 RX ADMIN — AMIODARONE HYDROCHLORIDE 100 MG: 200 TABLET ORAL at 09:10

## 2024-01-20 RX ADMIN — DEXAMETHASONE SODIUM PHOSPHATE 2 MG: 4 INJECTION, SOLUTION INTRA-ARTICULAR; INTRALESIONAL; INTRAMUSCULAR; INTRAVENOUS; SOFT TISSUE at 16:34

## 2024-01-20 RX ADMIN — RISPERIDONE 1 MG: 1 TABLET, ORALLY DISINTEGRATING ORAL at 09:14

## 2024-01-20 RX ADMIN — INSULIN GLARGINE 10 UNITS: 100 INJECTION, SOLUTION SUBCUTANEOUS at 09:15

## 2024-01-20 RX ADMIN — HEPARIN SODIUM 1600 UNITS: 1000 INJECTION INTRAVENOUS; SUBCUTANEOUS at 15:27

## 2024-01-20 RX ADMIN — TIOTROPIUM BROMIDE INHALATION SPRAY 2 PUFF: 3.12 SPRAY, METERED RESPIRATORY (INHALATION) at 08:22

## 2024-01-20 RX ADMIN — SODIUM CHLORIDE, PRESERVATIVE FREE 10 ML: 5 INJECTION INTRAVENOUS at 09:12

## 2024-01-20 RX ADMIN — HEPARIN SODIUM 5000 UNITS: 5000 INJECTION INTRAVENOUS; SUBCUTANEOUS at 21:36

## 2024-01-20 RX ADMIN — HEPARIN SODIUM 1600 UNITS: 1000 INJECTION INTRAVENOUS; SUBCUTANEOUS at 15:26

## 2024-01-20 RX ADMIN — CLOPIDOGREL BISULFATE 75 MG: 75 TABLET ORAL at 09:11

## 2024-01-20 RX ADMIN — LACOSAMIDE 100 MG: 10 INJECTION INTRAVENOUS at 09:12

## 2024-01-20 RX ADMIN — INSULIN LISPRO 2 UNITS: 100 INJECTION, SOLUTION INTRAVENOUS; SUBCUTANEOUS at 09:15

## 2024-01-20 RX ADMIN — SODIUM BICARBONATE 650 MG: 648 TABLET ORAL at 16:35

## 2024-01-20 RX ADMIN — SODIUM BICARBONATE 650 MG: 648 TABLET ORAL at 09:14

## 2024-01-20 RX ADMIN — ANTI-FUNGAL POWDER MICONAZOLE NITRATE TALC FREE: 1.42 POWDER TOPICAL at 22:04

## 2024-01-20 RX ADMIN — HEPARIN SODIUM 5000 UNITS: 5000 INJECTION INTRAVENOUS; SUBCUTANEOUS at 06:37

## 2024-01-20 ASSESSMENT — PAIN SCALES - GENERAL
PAINLEVEL_OUTOF10: 0
PAINLEVEL_OUTOF10: 0

## 2024-01-20 NOTE — PROGRESS NOTES
ACP (advance care planning)     Palliative care encounter     Acute cystitis with hematuria     Permanent atrial fibrillation (HCC)     Ischemic cardiomyopathy     History of end stage renal disease     Acute encephalopathy     Vasogenic cerebral edema (HCC)     Mass of temporoparietal region of brain     Encephalopathy     Delirium due to multiple etiologies     Cognitive impairment     Hallucinations     Preop cardiovascular exam     ESRD (end stage renal disease) (HCC)      Plan of Treatment:   Fluid volume per HD   Hx of CAD with CABG. Mid left cx stent 11/2023. If surgery needed, can hold  Plavix and asa and start on IV cangrelor until able to restart plavix,    Afib not on AC due to GI bleed  Mod to high risk for surgery per Dr. Melton    Electronically signed by Justin Leach, MAL - NP on 1/20/2024 at 10:18 AM  [facility]  [phone]

## 2024-01-20 NOTE — PROGRESS NOTES
[facility]  Office: [phone]  Arya Prasad DO, Jim Sesay DO, Harlan More DO, Nikolay Cummings DO, Sky Washington MD, Adina Lraa MD, Raquel Morales MD, Tonia Rosado MD,  Compa Kessler MD, Lonny Boles MD, Trung De Dios MD,  Rosy Blackmon DO, Sole Luna MD, Berry Miller MD, Saúl Prasad DO, Jessie Jeffers MD,  Ced Dixon DO, Sharyn Bae MD, Wendy Monsalve MD, Reta Cisneros MD, Reji Baptiste MD,  Brandon Molina MD, Ventura Hill MD, Brooke Matos MD, Gordo Boyle MD, Fausto Joya MD, Amber Riddle MD, Jacob Rich DO, Hamlet Ozuna DO, Kortney Blackwood MD,  Ba Murdock MD, Shirley Waterhouse, CNP,  Elizabeth Omalley CNP, Chuck Dougherty, CNP,  Jaqueline Inman, WENDY, Kim Santizo, CNP, María Elena Torres, CNP, Jessenia Middleton CNP, Sissy Gonzalez, CNP, Rita Gutierrez, CNP, Vivi De Jesus, PA-C, Juliana Munoz, PA-C, Patti Uriarte, CNP, Rashmi Cameron, CNS, Yesenia Solomon, CNP, July Jain, CNP, Tracy Schwab, CNP         [facility]   IN-PATIENT SERVICE   [facility]    Progress Note    1/20/2024    7:20 AM    Name:   Lupe Ojeda  MRN:     [id number]     Acct:      [id number]   Room:   0102/0102-01  IP Day:  4  Admit Date:  1/16/2024 11:30 PM    PCP:   Kayleigh Cardoso MD  Code Status:  Full Code    Subjective:     C/C: AMS  Interval History Status: not changed.     Patient seen and examined at bedside, no acute events overnight.    Still confused  , hallucinating  Blood pressure slightly elevated overnight  BS elevated liklely 2/2 steroids  Patient vitals, labs and all providers notes were reviewed,from overnight shift and morning updates were noted and discussed with the nurse    Brief History:     As documented in the medical record:  \"The patient is a 73 y.o. who was admitted to the hospital for the management of  AMS   Patient presented as a direct transfer from [place]. History was obtained from electronic  within order  parameters  Pulse 72   Temp 98.3 °F (36.8 °C) (Axillary)   Resp 13   Wt 60.2 kg (132 lb 11.5 oz)   SpO2 98%   BMI 25.92 kg/m²   Temp (24hrs), Av.7 °F (36.5 °C), Min:97.1 °F (36.2 °C), Max:98.3 °F (36.8 °C)    Recent Labs     24  0815 01/19/24  1212 01/19/24  1512 01/19/24  1933   POCGLU 197* 339* 174* 245*       I/O (24Hr):  No intake or output data in the 24 hours ending 24 0720    Labs:  Hematology:  Recent Labs     24  0624  0401   WBC 6.8 6.6 6.8   RBC 3.93* 3.56* 3.72*   HGB 11.6* 10.5* 11.0*   HCT 37.3 32.1* 37.6   MCV 94.9 90.2 101.1   MCH 29.5 29.5 29.6   MCHC 31.1 32.7 29.3   RDW 17.3* 17.2* 17.4*   PLT See Reflexed IPF Result See Reflexed IPF Result See Reflexed IPF Result     Chemistry:  Recent Labs     24  0613 24  1037 24  0401    140  --  141   K 4.3 4.3  --  4.1   * 104  --  107   CO2 19* 27  --  22   GLUCOSE 282* 238*  --  245*   BUN 40* 20  --  38*   CREATININE 3.5* 2.1*  --  3.0*   ANIONGAP 14 9  --  12   LABGLOM 13* 24*  --  16*   CALCIUM 8.4* 8.0*  --  7.7*   PHOS 3.5  --   --   --    LACTACIDWB 2.8*  --  3.3*  --      Recent Labs     24  1552 24  2116 24  0815 24  1212 24   POCGLU 175* 220* 197* 339* 174* 245*     ABG:  Lab Results   Component Value Date/Time    POCPH 7.411 2022 04:40 AM    PHART 7.381 01/15/2024 12:32 PM    POCPCO2 44.8 2022 04:40 AM    GOW6KJN 34.8 01/15/2024 12:32 PM    POCPO2 92.6 2022 04:40 AM    PO2ART 84.6 01/15/2024 12:32 PM    POCHCO3 28.5 2022 04:40 AM    ZHA7GCA 20.6 01/15/2024 12:32 PM    NBEA 4.5 01/15/2024 12:32 PM    PBEA 4.9 2022 04:08 PM    PNSJ7AYJ 97 2022 04:40 AM    Y2WOBIWN 94.1 01/15/2024 12:32 PM    FIO2 INFORMATION NOT PROVIDED 2024 06:46 PM     Lab Results   Component Value Date/Time    SPECIAL R ARM 3.5ML 2023 02:07 PM     Lab Results   Component

## 2024-01-20 NOTE — PROGRESS NOTES
Renal Progress Note    Patient :  Lupe Ojeda; 73 y.o. MRN# [id number]  Location:  0102/0102-01  Attending:  Bushra Palmer DO  Admit Date:  1/16/2024   Hospital Day: 4    Patient seen in dialysis  Subjective:     Following for ESRD TTS, transferred from [facility] for neurological workup due to CT head showing concerns for a meningioma with mass effect and EEG concerning for seizures.  Initially brought in secondary to altered mental status.  Per neurology meningioma with mild increase in size and associated edema.  She is minimally communicative.  She awakens to stimulation but did not verbalize.  They feel her current presentation is consistent with worsening dementia and delirium.  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  Currently no plans for surgical procedures.  She was seen by palliative but family is not ready for palliative care at this time. Working towards placement in ECF.  Labs today reviewed sodium 141, potassium 4.1, chloride 107, bicarb 22, creatinine 3.0.  Blood pressure stable.  She remains on normal saline at 35 cc/h.  She is on sodium bicarb tablets 650 mg 3 times daily.    Outpatient Medications:     Medications Prior to Admission: lacosamide (VIMPAT) 100 MG TABS tablet, Take 1 tablet by mouth 2 times daily for 60 days. Max Daily Amount: 200 mg  furosemide (LASIX) 80 MG tablet, Take 1 tablet by mouth every other day Water pill, does not take on dialysis days.  amiodarone (PACERONE) 100 MG tablet, Take 1 tablet by mouth every morning  aspirin 81 MG EC tablet, Take 1 tablet by mouth daily Always take with food, stop taking it if any black stools or rectal bleeding  clopidogrel (PLAVIX) 75 MG tablet, Take 1 tablet by mouth daily  desloratadine (CLARINEX) 5 MG tablet, Take 1 tablet by mouth daily  ketorolac (ACULAR) 0.5 % ophthalmic solution, INSTILL 1 DROP INTO AFFECTED EYE 4 TIMES DAILY STARTING 1 DAY BEFORE SURGERY  moxifloxacin (VIGAMOX) 0.5   S1 S2 irregular rhythm, no murmurs, gallops or rubs, right subclavian catheter  Abdomen: Soft, non-tender, no masses or organomegaly, BS audible.  :   No suprapubic or flank tenderness.  Neuro:  Moves extremities  SKIN:  No rashes, good skin turgor.  Extremities:  No edema, + bruit thrill over right aVF.  Patient also has a right chest tunneled catheter  Labs:       Recent Labs     01/18/24  0613 01/19/24 0223 01/20/24  0401   WBC 6.8 6.6 6.8   RBC 3.93* 3.56* 3.72*   HGB 11.6* 10.5* 11.0*   HCT 37.3 32.1* 37.6   MCV 94.9 90.2 101.1   MCH 29.5 29.5 29.6   MCHC 31.1 32.7 29.3   RDW 17.3* 17.2* 17.4*   PLT See Reflexed IPF Result See Reflexed IPF Result See Reflexed IPF Result        BMP:   Recent Labs     01/18/24  0613 01/19/24 0223 01/20/24  0401    140 141   K 4.3 4.3 4.1   * 104 107   CO2 19* 27 22   BUN 40* 20 38*   CREATININE 3.5* 2.1* 3.0*   GLUCOSE 282* 238* 245*   CALCIUM 8.4* 8.0* 7.7*        Phosphorus:     Recent Labs     01/18/24  0613   PHOS 3.5     YRIS:      Lab Results   Component Value Date/Time    YRIS NEGATIVE 10/27/2020 04:37 PM     SPEP:  Lab Results   Component Value Date/Time    PROT 4.9 01/13/2024 04:46 AM    ALBCAL 2.5 06/01/2022 03:00 PM    ALBPCT 48 06/01/2022 03:00 PM    LABALPH 0.3 06/01/2022 03:00 PM    LABALPH 1.2 06/01/2022 03:00 PM    A1PCT 6 06/01/2022 03:00 PM    A2PCT 23 06/01/2022 03:00 PM    BETAPCT 13 06/01/2022 03:00 PM    GAMGLOB 0.5 06/01/2022 03:00 PM    GGPCT 10 06/01/2022 03:00 PM    PATH ELECTRONICALLY SIGNED. BLOSSOM ARRIAGA M.D. 06/01/2022 03:00 PM    PATH ELECTRONICALLY SIGNED. BLOSSOM ARRIAGA M.D. 06/01/2022 03:00 PM     UPEP:     Lab Results   Component Value Date/Time    LABPE  10/27/2020 04:35 PM     ELEVATED PROTEIN CONCENTRATION.  MOST SERUM PROTEINS ARE DETECTED IN THIS URINE.  USUALLY OBSERVED WITH MARKEDLY INCREASED NON-SELECTIVE GLOMERULAR PERMEABILITY (i.e. SEVERE GLOMERULAR DISEASE), CONTAMINATION OF     C3:     Lab Results   Component

## 2024-01-20 NOTE — PLAN OF CARE
Problem: Safety - Adult  Goal: Free from fall injury  1/20/2024 1229 by Edinson Castaneda RN  Outcome: Progressing  1/20/2024 0321 by Jaqueline Singh RN  Outcome: Progressing     Problem: Chronic Conditions and Co-morbidities  Goal: Patient's chronic conditions and co-morbidity symptoms are monitored and maintained or improved  1/20/2024 1229 by Edinson Castaneda RN  Outcome: Progressing  1/20/2024 0321 by Jaqueline Singh RN  Outcome: Progressing     Problem: Discharge Planning  Goal: Discharge to home or other facility with appropriate resources  1/20/2024 1229 by Edinson Castaneda RN  Outcome: Progressing  1/20/2024 0321 by Jaqueline Singh RN  Outcome: Progressing     Problem: Skin/Tissue Integrity  Goal: Absence of new skin breakdown  Description: 1.  Monitor for areas of redness and/or skin breakdown  2.  Assess vascular access sites hourly  3.  Every 4-6 hours minimum:  Change oxygen saturation probe site  4.  Every 4-6 hours:  If on nasal continuous positive airway pressure, respiratory therapy assess nares and determine need for appliance change or resting period.  1/20/2024 1229 by Edinson Castaneda RN  Outcome: Progressing  1/20/2024 0321 by Jaqueline Singh RN  Outcome: Progressing     Problem: Respiratory - Adult  Goal: Achieves optimal ventilation and oxygenation  1/20/2024 0321 by Jaqueline Singh RN  Outcome: Progressing     Problem: Pain  Goal: Verbalizes/displays adequate comfort level or baseline comfort level  1/20/2024 1229 by Edinson Castaneda RN  Outcome: Progressing  1/20/2024 0321 by Jaqueline Singh RN  Outcome: Progressing

## 2024-01-20 NOTE — PROGRESS NOTES
[facility]   IN-PATIENT SERVICE  General Neurology Progress Note   [place]                Date:   1/20/2024  Patient name:  Lupe Ojeda  Date of admission:  1/16/2024 11:30 PM  MRN:   [id number]  Account:  [id number]  YOB: 1950  PCP:    Kayleigh Cardoso MD  Room:   [address] [place] [address]  Code Status:    Full Code  Chief Complaint: No chief complaint on file.      Interval history      Seen and examined at bedside    Patient very lethargic and barely awakening today, which is a change from her examination yesterday.  Will get stat CT head-  But pateint got risperidone this hour which could explain lethargy    Repeat CT head did not show any acute intracranial abnormalities, and redemonstrated the right middle cranial fossa meningioma.    Pending infectious workup by telemedicine    HbA1c 6.5%    Patient was more responsive later after dialysis    Risperidone and mirtazipine held for tonight      Brief History     Lupe Ojeda is a 73 y.o. 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  Patient was having waxing and waning mental status with associated hallucinations.  MRI and CT show no acute intracranial abnormalities, just stable meningioma in the right temporal lobe.  On exam, patient was lethargic, unable to answer questions, but was able to follow simple commands such as squeezing fingers and wiggling toes.  Patient was started on LTME to evaluate for possible seizure etiology which is shown occasional right parietal and occipital sharps and diffuse polymorphic delta and theta slowing.  Neurosurgery was consulted

## 2024-01-20 NOTE — PROGRESS NOTES
Dialysis Post Treatment Note  Vitals:    01/20/24 1643   BP: (!) 156/76   Pulse: 94   Resp: 20   Temp: 97.5 °F (36.4 °C)   SpO2: 97%     Pre-Weight = 59.1 kg  Post-weight = Weight - Scale: 57.9 kg (127 lb 10.3 oz)  Total Liters Processed = Blood Volume Processed (Liters): 80.61 L  Rinseback Volume (mL) = Rinseback Volume (ml): 300 ml  Net Removal (mL) =  1000 ml  Patient's dry weight=TBD  Type of access used=CVC @ Right Subclavian vein  Length of treatment=215 minutes      Pt completed HD TX with elevated BP but all the other  v/s were stable. Pt is confused and babbling during the first two hours of dialysis. No issues with arterial/venous flow. Catheter care done. Report given to floor nurse. Transported back to room with stable v/s

## 2024-01-20 NOTE — PROGRESS NOTES
Dialysis Time Out  To be done by RN and tech or 2 RNs  Staff Names abner LAY RN and Vitaly ALMEIDA RN    [x]  Identity of the patient using 2 patient identifiers  [x]  Consent for treatment  [x]  Equipment-proper machine and dialyzer  [x]  B-Hep B status  [x]  Orders- to include bath, blood flow, dialyzer, time and fluid removal  [x]  Access-Correct site and in working order  [x]  Time for patient to ask questions.

## 2024-01-21 LAB
ANION GAP SERPL CALCULATED.3IONS-SCNC: 11 MMOL/L (ref 9–16)
BASOPHILS # BLD: 0 K/UL (ref 0–0.2)
BASOPHILS NFR BLD: 0 % (ref 0–2)
BUN SERPL-MCNC: 27 MG/DL (ref 8–23)
CALCIUM SERPL-MCNC: 8 MG/DL (ref 8.6–10.4)
CHLORIDE SERPL-SCNC: 103 MMOL/L (ref 98–107)
CO2 SERPL-SCNC: 24 MMOL/L (ref 20–31)
CREAT SERPL-MCNC: 2.2 MG/DL (ref 0.5–0.9)
EOSINOPHIL # BLD: 0 K/UL (ref 0–0.4)
EOSINOPHILS RELATIVE PERCENT: 0 % (ref 1–4)
ERYTHROCYTE [DISTWIDTH] IN BLOOD BY AUTOMATED COUNT: 17.1 % (ref 11.8–14.4)
GFR SERPL CREATININE-BSD FRML MDRD: 23 ML/MIN/1.73M2
GLUCOSE BLD-MCNC: 102 MG/DL (ref 65–105)
GLUCOSE BLD-MCNC: 148 MG/DL (ref 65–105)
GLUCOSE BLD-MCNC: 169 MG/DL (ref 65–105)
GLUCOSE BLD-MCNC: 201 MG/DL (ref 65–105)
GLUCOSE SERPL-MCNC: 153 MG/DL (ref 74–99)
HCT VFR BLD AUTO: 39.1 % (ref 36.3–47.1)
HGB BLD-MCNC: 12.6 G/DL (ref 11.9–15.1)
IMM GRANULOCYTES # BLD AUTO: 0.1 K/UL (ref 0–0.3)
IMM GRANULOCYTES NFR BLD: 1 %
LACTIC ACID, WHOLE BLOOD: 3.4 MMOL/L (ref 0.7–2.1)
LYMPHOCYTES NFR BLD: 0.39 K/UL (ref 1–4.8)
LYMPHOCYTES RELATIVE PERCENT: 4 % (ref 24–44)
MCH RBC QN AUTO: 29.2 PG (ref 25.2–33.5)
MCHC RBC AUTO-ENTMCNC: 32.2 G/DL (ref 28.4–34.8)
MCV RBC AUTO: 90.5 FL (ref 82.6–102.9)
MONOCYTES NFR BLD: 0.98 K/UL (ref 0.1–0.8)
MONOCYTES NFR BLD: 10 % (ref 1–7)
MORPHOLOGY: ABNORMAL
NEUTROPHILS NFR BLD: 85 % (ref 36–66)
NEUTS SEG NFR BLD: 8.33 K/UL (ref 1.8–7.7)
NRBC BLD-RTO: 0 PER 100 WBC
PLATELET # BLD AUTO: ABNORMAL K/UL (ref 138–453)
PLATELET, FLUORESCENCE: 89 K/UL (ref 138–453)
PLATELETS.RETICULATED NFR BLD AUTO: 8.8 % (ref 1.1–10.3)
POTASSIUM SERPL-SCNC: 4.4 MMOL/L (ref 3.7–5.3)
RBC # BLD AUTO: 4.32 M/UL (ref 3.95–5.11)
SODIUM SERPL-SCNC: 138 MMOL/L (ref 136–145)
WBC OTHER # BLD: 9.8 K/UL (ref 3.5–11.3)

## 2024-01-21 PROCEDURE — 85055 RETICULATED PLATELET ASSAY: CPT

## 2024-01-21 PROCEDURE — 6370000000 HC RX 637 (ALT 250 FOR IP): Performed by: FAMILY MEDICINE

## 2024-01-21 PROCEDURE — 6360000002 HC RX W HCPCS: Performed by: FAMILY MEDICINE

## 2024-01-21 PROCEDURE — A4216 STERILE WATER/SALINE, 10 ML: HCPCS | Performed by: FAMILY MEDICINE

## 2024-01-21 PROCEDURE — 80048 BASIC METABOLIC PNL TOTAL CA: CPT

## 2024-01-21 PROCEDURE — 99232 SBSQ HOSP IP/OBS MODERATE 35: CPT | Performed by: FAMILY MEDICINE

## 2024-01-21 PROCEDURE — 36415 COLL VENOUS BLD VENIPUNCTURE: CPT

## 2024-01-21 PROCEDURE — 83605 ASSAY OF LACTIC ACID: CPT

## 2024-01-21 PROCEDURE — 6370000000 HC RX 637 (ALT 250 FOR IP): Performed by: INTERNAL MEDICINE

## 2024-01-21 PROCEDURE — 2580000003 HC RX 258: Performed by: INTERNAL MEDICINE

## 2024-01-21 PROCEDURE — 94640 AIRWAY INHALATION TREATMENT: CPT

## 2024-01-21 PROCEDURE — C9254 INJECTION, LACOSAMIDE: HCPCS | Performed by: INTERNAL MEDICINE

## 2024-01-21 PROCEDURE — C9113 INJ PANTOPRAZOLE SODIUM, VIA: HCPCS | Performed by: FAMILY MEDICINE

## 2024-01-21 PROCEDURE — 2060000000 HC ICU INTERMEDIATE R&B

## 2024-01-21 PROCEDURE — 6370000000 HC RX 637 (ALT 250 FOR IP): Performed by: PSYCHIATRY & NEUROLOGY

## 2024-01-21 PROCEDURE — 85025 COMPLETE CBC W/AUTO DIFF WBC: CPT

## 2024-01-21 PROCEDURE — 2580000003 HC RX 258: Performed by: FAMILY MEDICINE

## 2024-01-21 PROCEDURE — 99232 SBSQ HOSP IP/OBS MODERATE 35: CPT | Performed by: PSYCHIATRY & NEUROLOGY

## 2024-01-21 PROCEDURE — 6360000002 HC RX W HCPCS: Performed by: INTERNAL MEDICINE

## 2024-01-21 PROCEDURE — 6370000000 HC RX 637 (ALT 250 FOR IP): Performed by: STUDENT IN AN ORGANIZED HEALTH CARE EDUCATION/TRAINING PROGRAM

## 2024-01-21 PROCEDURE — 99232 SBSQ HOSP IP/OBS MODERATE 35: CPT | Performed by: INTERNAL MEDICINE

## 2024-01-21 PROCEDURE — 82947 ASSAY GLUCOSE BLOOD QUANT: CPT

## 2024-01-21 RX ORDER — RISPERIDONE 0.5 MG/1
0.5 TABLET, ORALLY DISINTEGRATING ORAL 2 TIMES DAILY
Status: DISCONTINUED | OUTPATIENT
Start: 2024-01-21 | End: 2024-01-23 | Stop reason: HOSPADM

## 2024-01-21 RX ORDER — LACOSAMIDE 100 MG/1
100 TABLET ORAL 2 TIMES DAILY
Status: DISCONTINUED | OUTPATIENT
Start: 2024-01-21 | End: 2024-01-23 | Stop reason: HOSPADM

## 2024-01-21 RX ADMIN — SODIUM BICARBONATE 650 MG: 648 TABLET ORAL at 08:38

## 2024-01-21 RX ADMIN — FUROSEMIDE 80 MG: 40 TABLET ORAL at 08:37

## 2024-01-21 RX ADMIN — LEVOTHYROXINE SODIUM 25 MCG: 50 TABLET ORAL at 08:36

## 2024-01-21 RX ADMIN — ANTI-FUNGAL POWDER MICONAZOLE NITRATE TALC FREE: 1.42 POWDER TOPICAL at 21:00

## 2024-01-21 RX ADMIN — METOPROLOL SUCCINATE 100 MG: 25 TABLET, EXTENDED RELEASE ORAL at 08:36

## 2024-01-21 RX ADMIN — DEXAMETHASONE SODIUM PHOSPHATE 2 MG: 4 INJECTION, SOLUTION INTRA-ARTICULAR; INTRALESIONAL; INTRAMUSCULAR; INTRAVENOUS; SOFT TISSUE at 12:38

## 2024-01-21 RX ADMIN — CLOPIDOGREL BISULFATE 75 MG: 75 TABLET ORAL at 08:36

## 2024-01-21 RX ADMIN — MUPIROCIN: 20 OINTMENT TOPICAL at 08:38

## 2024-01-21 RX ADMIN — DILTIAZEM HYDROCHLORIDE 60 MG: 60 TABLET, FILM COATED ORAL at 06:41

## 2024-01-21 RX ADMIN — LACOSAMIDE 100 MG: 100 TABLET, FILM COATED ORAL at 21:00

## 2024-01-21 RX ADMIN — ANTI-FUNGAL POWDER MICONAZOLE NITRATE TALC FREE: 1.42 POWDER TOPICAL at 08:38

## 2024-01-21 RX ADMIN — ATORVASTATIN CALCIUM 40 MG: 40 TABLET, FILM COATED ORAL at 17:30

## 2024-01-21 RX ADMIN — RISPERIDONE 0.5 MG: 0.5 TABLET, ORALLY DISINTEGRATING ORAL at 21:30

## 2024-01-21 RX ADMIN — DILTIAZEM HYDROCHLORIDE 60 MG: 60 TABLET, FILM COATED ORAL at 17:30

## 2024-01-21 RX ADMIN — SODIUM CHLORIDE, PRESERVATIVE FREE 10 ML: 5 INJECTION INTRAVENOUS at 08:41

## 2024-01-21 RX ADMIN — DEXAMETHASONE SODIUM PHOSPHATE 2 MG: 4 INJECTION, SOLUTION INTRA-ARTICULAR; INTRALESIONAL; INTRAMUSCULAR; INTRAVENOUS; SOFT TISSUE at 21:30

## 2024-01-21 RX ADMIN — SODIUM CHLORIDE, PRESERVATIVE FREE 40 MG: 5 INJECTION INTRAVENOUS at 08:40

## 2024-01-21 RX ADMIN — SODIUM CHLORIDE, PRESERVATIVE FREE 10 ML: 5 INJECTION INTRAVENOUS at 21:31

## 2024-01-21 RX ADMIN — INSULIN LISPRO 2 UNITS: 100 INJECTION, SOLUTION INTRAVENOUS; SUBCUTANEOUS at 12:37

## 2024-01-21 RX ADMIN — SODIUM BICARBONATE 650 MG: 648 TABLET ORAL at 12:52

## 2024-01-21 RX ADMIN — DILTIAZEM HYDROCHLORIDE 60 MG: 60 TABLET, FILM COATED ORAL at 12:52

## 2024-01-21 RX ADMIN — DEXAMETHASONE SODIUM PHOSPHATE 2 MG: 4 INJECTION, SOLUTION INTRA-ARTICULAR; INTRALESIONAL; INTRAMUSCULAR; INTRAVENOUS; SOFT TISSUE at 04:55

## 2024-01-21 RX ADMIN — HEPARIN SODIUM 5000 UNITS: 5000 INJECTION INTRAVENOUS; SUBCUTANEOUS at 12:37

## 2024-01-21 RX ADMIN — BUDESONIDE AND FORMOTEROL FUMARATE DIHYDRATE 2 PUFF: 160; 4.5 AEROSOL RESPIRATORY (INHALATION) at 08:18

## 2024-01-21 RX ADMIN — SODIUM BICARBONATE 650 MG: 648 TABLET ORAL at 21:30

## 2024-01-21 RX ADMIN — SODIUM CHLORIDE: 9 INJECTION, SOLUTION INTRAVENOUS at 05:04

## 2024-01-21 RX ADMIN — LACOSAMIDE 100 MG: 10 INJECTION INTRAVENOUS at 08:40

## 2024-01-21 RX ADMIN — HEPARIN SODIUM 5000 UNITS: 5000 INJECTION INTRAVENOUS; SUBCUTANEOUS at 06:34

## 2024-01-21 RX ADMIN — RISPERIDONE 0.5 MG: 0.5 TABLET, ORALLY DISINTEGRATING ORAL at 12:37

## 2024-01-21 RX ADMIN — BUDESONIDE AND FORMOTEROL FUMARATE DIHYDRATE 2 PUFF: 160; 4.5 AEROSOL RESPIRATORY (INHALATION) at 20:34

## 2024-01-21 RX ADMIN — INSULIN GLARGINE 10 UNITS: 100 INJECTION, SOLUTION SUBCUTANEOUS at 08:40

## 2024-01-21 RX ADMIN — HEPARIN SODIUM 5000 UNITS: 5000 INJECTION INTRAVENOUS; SUBCUTANEOUS at 21:30

## 2024-01-21 RX ADMIN — AMIODARONE HYDROCHLORIDE 100 MG: 200 TABLET ORAL at 08:38

## 2024-01-21 RX ADMIN — ASPIRIN 81 MG: 81 TABLET, COATED ORAL at 08:38

## 2024-01-21 RX ADMIN — CETIRIZINE HYDROCHLORIDE 5 MG: 10 TABLET ORAL at 08:37

## 2024-01-21 RX ADMIN — TIOTROPIUM BROMIDE INHALATION SPRAY 2 PUFF: 3.12 SPRAY, METERED RESPIRATORY (INHALATION) at 08:18

## 2024-01-21 RX ADMIN — HYDROXYZINE HYDROCHLORIDE 50 MG: 50 TABLET ORAL at 21:30

## 2024-01-21 NOTE — CARE COORDINATION
Called Patient's daughter Sea to inquire about denial at [facility].  Asked for more choices.  She tells me [facility].  I informed her that I will need more choices as [facility] may not be able to accept.  She states she will review the list.

## 2024-01-21 NOTE — PROGRESS NOTES
[facility] Neurology   IN-PATIENT SERVICE  General Neurology Progress Note   [facility]                Date:   1/21/2024  Patient name:  Lupe Ojeda  Date of admission:  1/16/2024 11:30 PM  MRN:   [id number]  Account:  [id number]  YOB: 1950  PCP:    Kayleigh Cardoso MD  Room:   [address] [place] [address]  Code Status:    Full Code  Chief Complaint: No chief complaint on file.      Interval history      Seen and examined at bedside.  No acute events overnight.  Patient awake and alert, not answering any orientation questions.  Continues to say random things throughout the encounter.  Exam continues to remain nonfocal.    Discussed with nursing staff, no new concerns or issues.    Continue to hold risperidone and mirtazapine for now.    Awaiting placement to SNF.    Brief History     Lupe Ojeda is a 73 y.o. 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  Patient was having waxing and waning mental status with associated hallucinations.  MRI and CT show no acute intracranial abnormalities, just stable meningioma in the right temporal lobe.  On exam, patient was lethargic, unable to answer questions, but was able to follow simple commands such as squeezing fingers and wiggling toes.  Patient was started on LTME to evaluate for possible seizure etiology which is shown occasional right parietal and occipital sharps and diffuse polymorphic delta and theta slowing.  Neurosurgery was consulted for possible intervention of meningioma, recommending MRI with Stealth protocol.      Review of Systems   Review of Systems   Reason unable to perform  Neurosurgery recommending MRI brain with and without contrast with stealth protocol to better characterize the mass. Patient on LTME to rule out seizure etiology-so far has shown occasional right parietal and occipital sharps in addition to diffuse polymorphic delta and theta slowing, and LTME was since stopped. Alertness improved after holding risperidone.    Delirium superimposed on underlying dementia, likely multifactorial in nature   -TSH and ammonia wnl,  -UA pending  -CBC showed 2 point drop in Hgb  -BMP unremarkable, Cr 2.1  -lactic acid elevated at 2.8  -Risperidone and mirtazapine held due to increased lethargy on it     Right temporal meningioma  - Most recent MRI brain (1/11/2024): Stable right-sided anterior middle cranial fossa meningioma. Stable vasogenic edema and mild mass effect in the anterior right temporal lobe  - Continue Decadron 2 mg TID  - Patient was seen and evaluated by NS in [facility] in August 2023, recommended outpatient follow up in 6 weeks, but, apparently, patient did not follow-up   -Cardiology consulted for cardiac clearance   -pt deemed moderate to high risk for surgery     Underlying seizure disorder  - Continue IV Vimpat 100 mg BID  -LTME to r/o seizure etiology- was unremarkable and DC'd    Hyperglycemia  -start low dose sliding scale  -POCT glucose QID AC &HS  -hypoglycemia protocol    CAD s/p CABG  -continue ASA 81 and plavix 75 mg daily     ESRD, on hemodialysis  -nephrology managing dialysis  -recommend against gadolinium contrast unless absolutely necessary        Diet: Regular  DVT prophylaxis: On heparin 500 units 3 times daily  PT/OT/SW    Patient to be discussed with attending physician, Dr. Spencer Brock MD  Neurology resident PGY-4  1/21/2024 8:13 AM    Copy sent to Kayleigh Pittman MD    This note is created with the assistance of a speech-recognition program. While intending to generate a document that actually reflects the content of the visit,

## 2024-01-21 NOTE — PLAN OF CARE

## 2024-01-21 NOTE — PROGRESS NOTES
[facility]  Office: [phone]  Arya Prasad DO, Jim Sesay, DO, Harlan More DO, Nikolay Cummings, DO, Sky Washington MD, Adina Lara MD, Raquel Morales MD, Tonia Rosado MD,  Compa Kessler MD, Lonny Boles MD, Trung De Dios MD,  Rosy Blackmon DO, Sole Luna MD, Berry Miller MD, Saúl Prasad DO, Jessie Jeffers MD,  Ced Dixon DO, Sharyn Bae MD, Wendy Monsalve MD, Reta Cisneros MD, Reji Baptiste MD,  Brandon Molina MD, Ventura Hill MD, Brooke Matos MD, Gordo Boyle MD, Fausto Joya MD, Amber Riddle MD, Jacob Rich DO, Hamlet Ozuna DO, Kortney Blackwood MD,  Ba Murdock MD, Shirley Waterhouse, CNP,  Elizabeth Omalley, CNP, Chuck Dougherty, CNP,  Jaqueline Inman, DNP, Kim Santizo, CNP, María Elena Torres, CNP, Jessenia Middleton CNP, Sissy Gonzalez, CNP, Rita Gutierrez, CNP, Vivi De Jesus, PA-C, Juliana Munoz, PA-C, Patti Uriarte, CNP, Rashmi Cameron, CNS, Yesenia Solomon, CNP, July Jain, CNP, Tracy Schwab, CNP         [facility]   IN-PATIENT SERVICE   [facility]    Progress Note    1/21/2024    7:33 AM    Name:   Lupe Ojeda  MRN:     [id number]     Acct:      [id number]   Room:   0102/0102-01   Day:  5  Admit Date:  1/16/2024 11:30 PM    PCP:   Kayleigh Cardoso MD  Code Status:  Full Code    Subjective:     C/C: AMS  Interval History Status: slightly confused     Patient seen and examined at bedside, no acute events overnight.    Overall much more awake but still confused and hallucinating  Blood pressure slightly elevated overnight  BS are better  Patient vitals, labs and all providers notes were reviewed,from overnight shift and morning updates were noted and discussed with the nurse    Brief History:     As documented in the medical record:  \"The patient is a 73 y.o. who was admitted to the hospital for the management of  AMS   Patient presented as a direct transfer from [place]. History was obtained from  electronic medical records since patient is delirious and is not able to provide any meaningful history.  She has extensive PMH including CAD s/p CABG, CHF with reduced EF, paroxysmal A-fib (on anticoagulation), ESRD (on hemodialysis), bilateral cataract, DM type II, hypothyroidism, HTN, COPD.  As per medical chart, patient had frequent episodes of AMS, agitations and hallucinations.  There is also a suspicion for underlying dementia due to progressive cognitive decline that was reported by family.  There is a concern for underlying seizure seizure disorder due to abnormal EEG, most recent injury EEG (January 2024): Intermittent generalized epileptiform discharges, patient is currently on Vimpat  Most recent MRI was obtained 6 days ago, on 1/11/2024 -showed stable right-sided anterior medial cranial fossa meningioma.  Stable vasogenic edema and mild mass effect is the anterior right temporal lobe.  Patient required Precedex drip due to agitation and combativeness over the course of her most recent admission at [facility].  Of note, patient had an admission to [facility] for fluctuating AMS and intermittent agitation.  She was seen and evaluated by neurosurgery at that time recommended outpatient follow-up.  Was noted able to find any encounters with neurosurgery and medical chart.\"    Review of Systems:     Review of Systems   Unable to perform ROS: Mental status change       Medications:     Allergies:    Allergies   Allergen Reactions    Latex Rash    Aleve [Naproxen Sodium]      Chronic kidney disease stage III, CHF    Apixaban      Severe GI bleed, needed blood transfusion      Naproxen      ESKD, CHF    Pioglitazone Other (See Comments)     Congestive heart failure    Claritin [Loratadine]     Keflex [Cephalexin]     Adhesive Tape     Keppra [Levetiracetam]     Lisinopril      Needs clarification of contraindication       Current Meds:   Scheduled Meds:    insulin glargine  10 Units SubCUTAneous Daily

## 2024-01-21 NOTE — PROGRESS NOTES
+Renal Progress Note    Patient :  Lupe Ojeda; 73 y.o. MRN# [id number]  Location:  0102/0102-01  Attending:  Bushra Palmer DO  Admit Date:  1/16/2024   Hospital Day: 5      Subjective:     Following for ESRD TTS, transferred from [facility] for neurological workup due to CT head showing concerns for a meningioma with mass effect and EEG concerning for seizures.  Initially brought in secondary to altered mental status.  Per neurology meningioma with mild increase in size and associated edema.  She is minimally communicative.  She awakens to stimulation but did not verbalize.  They feel her current presentation is consistent with worsening dementia and delirium.  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  Currently no plans for surgical procedures.  She was seen by palliative but family is not ready for palliative care at this time. Working towards placement in ECF.  Labs today reviewed sodium 138, potassium 4.4, chloride 103, bicarb 24, creatinine 2.2.  Blood pressure stable.    She is on sodium bicarb tablets 650 mg 3 times daily.  Patient confused, delirious.     Outpatient Medications:     Medications Prior to Admission: lacosamide (VIMPAT) 100 MG TABS tablet, Take 1 tablet by mouth 2 times daily for 60 days. Max Daily Amount: 200 mg  furosemide (LASIX) 80 MG tablet, Take 1 tablet by mouth every other day Water pill, does not take on dialysis days.  amiodarone (PACERONE) 100 MG tablet, Take 1 tablet by mouth every morning  aspirin 81 MG EC tablet, Take 1 tablet by mouth daily Always take with food, stop taking it if any black stools or rectal bleeding  clopidogrel (PLAVIX) 75 MG tablet, Take 1 tablet by mouth daily  desloratadine (CLARINEX) 5 MG tablet, Take 1 tablet by mouth daily  ketorolac (ACULAR) 0.5 % ophthalmic solution, INSTILL 1 DROP INTO AFFECTED EYE 4 TIMES DAILY STARTING 1 DAY BEFORE SURGERY  moxifloxacin (VIGAMOX) 0.5 % ophthalmic solution, INSTILL  1/21/2024 at 12:58 PM

## 2024-01-22 LAB
ANION GAP SERPL CALCULATED.3IONS-SCNC: 13 MMOL/L (ref 9–16)
BASOPHILS # BLD: 0 K/UL (ref 0–0.2)
BASOPHILS NFR BLD: 0 % (ref 0–2)
BUN SERPL-MCNC: 47 MG/DL (ref 8–23)
CALCIUM SERPL-MCNC: 8 MG/DL (ref 8.6–10.4)
CHLORIDE SERPL-SCNC: 105 MMOL/L (ref 98–107)
CO2 SERPL-SCNC: 20 MMOL/L (ref 20–31)
CREAT SERPL-MCNC: 3.2 MG/DL (ref 0.5–0.9)
EOSINOPHIL # BLD: 0 K/UL (ref 0–0.4)
EOSINOPHILS RELATIVE PERCENT: 0 % (ref 1–4)
ERYTHROCYTE [DISTWIDTH] IN BLOOD BY AUTOMATED COUNT: 17.3 % (ref 11.8–14.4)
GFR SERPL CREATININE-BSD FRML MDRD: 15 ML/MIN/1.73M2
GLUCOSE BLD-MCNC: 151 MG/DL (ref 65–105)
GLUCOSE BLD-MCNC: 201 MG/DL (ref 65–105)
GLUCOSE BLD-MCNC: 233 MG/DL (ref 65–105)
GLUCOSE BLD-MCNC: 263 MG/DL (ref 65–105)
GLUCOSE SERPL-MCNC: 196 MG/DL (ref 74–99)
HCT VFR BLD AUTO: 34.1 % (ref 36.3–47.1)
HGB BLD-MCNC: 10.8 G/DL (ref 11.9–15.1)
IMM GRANULOCYTES # BLD AUTO: 0.4 K/UL (ref 0–0.3)
IMM GRANULOCYTES NFR BLD: 4 %
LYMPHOCYTES NFR BLD: 0.5 K/UL (ref 1–4.8)
LYMPHOCYTES RELATIVE PERCENT: 5 % (ref 24–44)
MCH RBC QN AUTO: 30.1 PG (ref 25.2–33.5)
MCHC RBC AUTO-ENTMCNC: 31.7 G/DL (ref 28.4–34.8)
MCV RBC AUTO: 95 FL (ref 82.6–102.9)
MONOCYTES NFR BLD: 0.59 K/UL (ref 0.1–0.8)
MONOCYTES NFR BLD: 6 % (ref 1–7)
MORPHOLOGY: ABNORMAL
NEUTROPHILS NFR BLD: 85 % (ref 36–66)
NEUTS SEG NFR BLD: 8.41 K/UL (ref 1.8–7.7)
NRBC BLD-RTO: 0 PER 100 WBC
PLATELET # BLD AUTO: 236 K/UL (ref 138–453)
PMV BLD AUTO: 12.1 FL (ref 8.1–13.5)
POTASSIUM SERPL-SCNC: 4.5 MMOL/L (ref 3.7–5.3)
RBC # BLD AUTO: 3.59 M/UL (ref 3.95–5.11)
SODIUM SERPL-SCNC: 138 MMOL/L (ref 136–145)
WBC OTHER # BLD: 9.9 K/UL (ref 3.5–11.3)

## 2024-01-22 PROCEDURE — 36415 COLL VENOUS BLD VENIPUNCTURE: CPT

## 2024-01-22 PROCEDURE — 6370000000 HC RX 637 (ALT 250 FOR IP): Performed by: STUDENT IN AN ORGANIZED HEALTH CARE EDUCATION/TRAINING PROGRAM

## 2024-01-22 PROCEDURE — 97110 THERAPEUTIC EXERCISES: CPT

## 2024-01-22 PROCEDURE — 94761 N-INVAS EAR/PLS OXIMETRY MLT: CPT

## 2024-01-22 PROCEDURE — 6370000000 HC RX 637 (ALT 250 FOR IP): Performed by: INTERNAL MEDICINE

## 2024-01-22 PROCEDURE — 6370000000 HC RX 637 (ALT 250 FOR IP): Performed by: PSYCHIATRY & NEUROLOGY

## 2024-01-22 PROCEDURE — 99232 SBSQ HOSP IP/OBS MODERATE 35: CPT | Performed by: FAMILY MEDICINE

## 2024-01-22 PROCEDURE — 6360000002 HC RX W HCPCS: Performed by: FAMILY MEDICINE

## 2024-01-22 PROCEDURE — 2060000000 HC ICU INTERMEDIATE R&B

## 2024-01-22 PROCEDURE — 99213 OFFICE O/P EST LOW 20 MIN: CPT

## 2024-01-22 PROCEDURE — 2580000003 HC RX 258: Performed by: FAMILY MEDICINE

## 2024-01-22 PROCEDURE — 82947 ASSAY GLUCOSE BLOOD QUANT: CPT

## 2024-01-22 PROCEDURE — 85025 COMPLETE CBC W/AUTO DIFF WBC: CPT

## 2024-01-22 PROCEDURE — C9113 INJ PANTOPRAZOLE SODIUM, VIA: HCPCS | Performed by: FAMILY MEDICINE

## 2024-01-22 PROCEDURE — 6370000000 HC RX 637 (ALT 250 FOR IP): Performed by: FAMILY MEDICINE

## 2024-01-22 PROCEDURE — 6360000002 HC RX W HCPCS: Performed by: INTERNAL MEDICINE

## 2024-01-22 PROCEDURE — 2500000003 HC RX 250 WO HCPCS: Performed by: INTERNAL MEDICINE

## 2024-01-22 PROCEDURE — 99232 SBSQ HOSP IP/OBS MODERATE 35: CPT | Performed by: PSYCHIATRY & NEUROLOGY

## 2024-01-22 PROCEDURE — 2580000003 HC RX 258: Performed by: INTERNAL MEDICINE

## 2024-01-22 PROCEDURE — 94640 AIRWAY INHALATION TREATMENT: CPT

## 2024-01-22 PROCEDURE — 99232 SBSQ HOSP IP/OBS MODERATE 35: CPT | Performed by: INTERNAL MEDICINE

## 2024-01-22 PROCEDURE — 80048 BASIC METABOLIC PNL TOTAL CA: CPT

## 2024-01-22 RX ORDER — SODIUM BICARBONATE 650 MG/1
650 TABLET ORAL 2 TIMES DAILY
Status: DISCONTINUED | OUTPATIENT
Start: 2024-01-22 | End: 2024-01-23 | Stop reason: HOSPADM

## 2024-01-22 RX ORDER — CIPROFLOXACIN 2 MG/ML
400 INJECTION, SOLUTION INTRAVENOUS EVERY 24 HOURS
Status: DISCONTINUED | OUTPATIENT
Start: 2024-01-22 | End: 2024-01-22

## 2024-01-22 RX ADMIN — SODIUM BICARBONATE 650 MG: 648 TABLET ORAL at 09:11

## 2024-01-22 RX ADMIN — SODIUM BICARBONATE 650 MG: 648 TABLET ORAL at 20:01

## 2024-01-22 RX ADMIN — INSULIN GLARGINE 10 UNITS: 100 INJECTION, SOLUTION SUBCUTANEOUS at 09:21

## 2024-01-22 RX ADMIN — ANTI-FUNGAL POWDER MICONAZOLE NITRATE TALC FREE: 1.42 POWDER TOPICAL at 09:12

## 2024-01-22 RX ADMIN — RISPERIDONE 0.5 MG: 0.5 TABLET, ORALLY DISINTEGRATING ORAL at 20:59

## 2024-01-22 RX ADMIN — BUDESONIDE AND FORMOTEROL FUMARATE DIHYDRATE 2 PUFF: 160; 4.5 AEROSOL RESPIRATORY (INHALATION) at 08:03

## 2024-01-22 RX ADMIN — DEXAMETHASONE SODIUM PHOSPHATE 2 MG: 4 INJECTION, SOLUTION INTRA-ARTICULAR; INTRALESIONAL; INTRAMUSCULAR; INTRAVENOUS; SOFT TISSUE at 05:01

## 2024-01-22 RX ADMIN — ANTI-FUNGAL POWDER MICONAZOLE NITRATE TALC FREE: 1.42 POWDER TOPICAL at 19:56

## 2024-01-22 RX ADMIN — ATORVASTATIN CALCIUM 40 MG: 40 TABLET, FILM COATED ORAL at 16:55

## 2024-01-22 RX ADMIN — DEXAMETHASONE SODIUM PHOSPHATE 2 MG: 4 INJECTION, SOLUTION INTRA-ARTICULAR; INTRALESIONAL; INTRAMUSCULAR; INTRAVENOUS; SOFT TISSUE at 19:56

## 2024-01-22 RX ADMIN — METOPROLOL SUCCINATE 100 MG: 25 TABLET, EXTENDED RELEASE ORAL at 09:09

## 2024-01-22 RX ADMIN — LACOSAMIDE 100 MG: 100 TABLET, FILM COATED ORAL at 09:10

## 2024-01-22 RX ADMIN — LACOSAMIDE 100 MG: 100 TABLET, FILM COATED ORAL at 19:56

## 2024-01-22 RX ADMIN — SODIUM CHLORIDE, PRESERVATIVE FREE 10 ML: 5 INJECTION INTRAVENOUS at 19:58

## 2024-01-22 RX ADMIN — DILTIAZEM HYDROCHLORIDE 60 MG: 60 TABLET, FILM COATED ORAL at 05:23

## 2024-01-22 RX ADMIN — DEXAMETHASONE SODIUM PHOSPHATE 2 MG: 4 INJECTION, SOLUTION INTRA-ARTICULAR; INTRALESIONAL; INTRAMUSCULAR; INTRAVENOUS; SOFT TISSUE at 12:33

## 2024-01-22 RX ADMIN — RISPERIDONE 0.5 MG: 0.5 TABLET, ORALLY DISINTEGRATING ORAL at 09:12

## 2024-01-22 RX ADMIN — SODIUM CHLORIDE, PRESERVATIVE FREE 10 ML: 5 INJECTION INTRAVENOUS at 09:21

## 2024-01-22 RX ADMIN — SODIUM CHLORIDE, PRESERVATIVE FREE 40 MG: 5 INJECTION INTRAVENOUS at 09:09

## 2024-01-22 RX ADMIN — DILTIAZEM HYDROCHLORIDE 60 MG: 60 TABLET, FILM COATED ORAL at 16:55

## 2024-01-22 RX ADMIN — AMIODARONE HYDROCHLORIDE 100 MG: 200 TABLET ORAL at 09:10

## 2024-01-22 RX ADMIN — HEPARIN SODIUM 5000 UNITS: 5000 INJECTION INTRAVENOUS; SUBCUTANEOUS at 20:05

## 2024-01-22 RX ADMIN — ACETAMINOPHEN 650 MG: 325 TABLET ORAL at 16:55

## 2024-01-22 RX ADMIN — LEVOTHYROXINE SODIUM 25 MCG: 50 TABLET ORAL at 09:09

## 2024-01-22 RX ADMIN — INSULIN LISPRO 2 UNITS: 100 INJECTION, SOLUTION INTRAVENOUS; SUBCUTANEOUS at 09:21

## 2024-01-22 RX ADMIN — INSULIN LISPRO 2 UNITS: 100 INJECTION, SOLUTION INTRAVENOUS; SUBCUTANEOUS at 12:32

## 2024-01-22 RX ADMIN — HEPARIN SODIUM 5000 UNITS: 5000 INJECTION INTRAVENOUS; SUBCUTANEOUS at 05:02

## 2024-01-22 RX ADMIN — CETIRIZINE HYDROCHLORIDE 5 MG: 10 TABLET ORAL at 09:11

## 2024-01-22 RX ADMIN — DILTIAZEM HYDROCHLORIDE 60 MG: 60 TABLET, FILM COATED ORAL at 23:48

## 2024-01-22 RX ADMIN — HEPARIN SODIUM 5000 UNITS: 5000 INJECTION INTRAVENOUS; SUBCUTANEOUS at 14:20

## 2024-01-22 RX ADMIN — MUPIROCIN: 20 OINTMENT TOPICAL at 09:13

## 2024-01-22 RX ADMIN — CLOPIDOGREL BISULFATE 75 MG: 75 TABLET ORAL at 09:10

## 2024-01-22 RX ADMIN — DILTIAZEM HYDROCHLORIDE 60 MG: 60 TABLET, FILM COATED ORAL at 12:33

## 2024-01-22 RX ADMIN — INSULIN LISPRO 4 UNITS: 100 INJECTION, SOLUTION INTRAVENOUS; SUBCUTANEOUS at 16:55

## 2024-01-22 RX ADMIN — ASPIRIN 81 MG: 81 TABLET, COATED ORAL at 09:10

## 2024-01-22 RX ADMIN — TIOTROPIUM BROMIDE INHALATION SPRAY 2 PUFF: 3.12 SPRAY, METERED RESPIRATORY (INHALATION) at 08:03

## 2024-01-22 ASSESSMENT — ENCOUNTER SYMPTOMS: BACK PAIN: 1

## 2024-01-22 ASSESSMENT — PAIN SCALES - GENERAL: PAINLEVEL_OUTOF10: 0

## 2024-01-22 NOTE — PROGRESS NOTES
Pharmacy Note     Renal Dose Adjustment    Lupe Ojeda is a 73 y.o. female. Pharmacist assessment of renally cleared medications.    Recent Labs     01/21/24  0607 01/22/24  0501   BUN 27* 47*       Recent Labs     01/21/24  0607 01/22/24  0501   CREATININE 2.2* 3.2*       Estimated Creatinine Clearance: 12 mL/min (A) (based on SCr of 3.2 mg/dL (H)).      Height:   Ht Readings from Last 1 Encounters:   01/09/24 1.524 m (5')     Weight:  Wt Readings from Last 1 Encounters:   01/20/24 57.9 kg (127 lb 10.3 oz)       The following medication dose has been adjusted based upon renal function per P&T Guidelines:             Ciprofloxacin IV adjusted from 200mg Q12H to 400mg Q24H based on recommended dosing for HD.

## 2024-01-22 NOTE — CARE COORDINATION
Transitional planning: Left HIPAA compliant message requesting return call regarding referral status. Call back number provided.    1117 Phone call from KARLY Ruelas that patient is not appropriate for OP HD and should be placed with inpatient HD.    1130 Phone call to patient's daughter Orah to discuss the above. Provided the names of the 3 in house dialysis units to her. She would like a referral to [facility]-referral sent. Does NOT want a referral to [facility] and will let  know about [facility] as she is not familiar with it and would like to look into it a little before she agrees to a referral.     1140 Phone call from Misty with [facility]. They are reviewing.

## 2024-01-22 NOTE — PROGRESS NOTES
Immature Granulocytes 4 (H) 0 %    Neutrophils % 85 (H) 36 - 66 %    Lymphocytes % 5 (L) 24 - 44 %    Monocytes % 6 1 - 7 %    Eosinophils % 0 (L) 1 - 4 %    Basophils % 0 0 - 2 %    Absolute Immature Granulocyte 0.40 (H) 0.00 - 0.30 k/uL    Neutrophils Absolute 8.41 (H) 1.8 - 7.7 k/uL    Lymphocytes Absolute 0.50 (L) 1.0 - 4.8 k/uL    Monocytes Absolute 0.59 0.1 - 0.8 k/uL    Eosinophils Absolute 0.00 0.0 - 0.4 k/uL    Basophils Absolute 0.00 0.0 - 0.2 k/uL    Morphology ANISOCYTOSIS PRESENT    Basic Metabolic Panel w/ Reflex to MG    Collection Time: 01/22/24  5:01 AM   Result Value Ref Range    Sodium 138 136 - 145 mmol/L    Potassium 4.5 3.7 - 5.3 mmol/L    Chloride 105 98 - 107 mmol/L    CO2 20 20 - 31 mmol/L    Anion Gap 13 9 - 16 mmol/L    Glucose 196 (H) 74 - 99 mg/dL    BUN 47 (H) 8 - 23 mg/dL    Creatinine 3.2 (H) 0.50 - 0.90 mg/dL    Est, Glom Filt Rate 15 (L) >60 mL/min/1.73m2    Calcium 8.0 (L) 8.6 - 10.4 mg/dL         Radiology:    MRI Brain without contrast 1/11/24: Stable right-sided anterior middle cranial fossa meningioma. Stable vasogenic edema and mild mass effect in the anterior right temporal lobe.    EEG: Occasional right parietal and occipital sharps and diffuse polymorphic delta and theta slowing    CT head without contrast 1/8/24: Stable right middle cranial fossa meningioma with mass effect and vasogenic edema.    CT Angiogram of the head and neck:    Assessment & Plan     Impression: Lupe Ojeda is a 73 y.o. 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  Imaging studies show no acute intracranial abnormalities, just stable meningioma in the  the assistance of a speech-recognition program. While intending to generate a document that actually reflects the content of the visit, the document can still have some errors including those of syntax and sound a- like substitutions which may escape proofreading. In such instances, actual meaning can be extrapolated by contextual derivation.

## 2024-01-22 NOTE — PROGRESS NOTES
NEPHROLOGY PROGRESS NOTE      ASSESSMENT     ESRD on maintenance hemodialysis Tuesday Thursday Saturday at [facility] using right IJ tunnel catheter and follows up with Dr. Novak  Admitted with altered mental sensorium/acute metabolic encephalopathy suspected worsening progressive decline dementia versus meningioma with worsening vasogenic edema  Meningioma with vasogenic edema.  No surgical intervention planned  Seizure disorder  CABG with ejection fraction 40%  Essential hypertension  A-fib with RVR  Hypothyroidism    PLAN     Continue hemodialysis as scheduled tomorrow  Decreased p.o. sodium bicarbonate  Okay to discharge from nephrology standpoint      SUBJECTIVE     Transferred from [facility] for neurological workup in view of worsening of mentation with background history of meningioma and vasogenic edema along with underlying delirium.  Septic workup negative.    Mentation continues to be remain the same.  Scheduled for hemodialysis tomorrow.  No acute hemodynamic issues overnight.  Patient has been seen by palliative but family not ready for palliative care at this time.  Working towards placement in ECF.    OBJECTIVE     Vitals:    01/22/24 0340 01/22/24 0400 01/22/24 0800 01/22/24 0803   BP: 129/85 (!) 129/50     Pulse: 81 76  66   Resp: 17 14  22   Temp: 97.6 °F (36.4 °C)  97.4 °F (36.3 °C)    TempSrc: Axillary  Axillary    SpO2: 97%   96%   Weight:         24HR INTAKE/OUTPUT:  No intake or output data in the 24 hours ending 01/22/24 0925    General appearance: in no acute distress  HEENT: PERRLA  Respiratory::vesicular breath sounds,no wheeze/crackles  Cardiovascular:S1 S2 normal,no gallop or organic murmur.  Abdomen:Non tender/non distended.Bowel sounds present  Extremities: No Cyanosis or Clubbing,Lower extremity edema  Neurological: Moves extremities/AMS      MEDICATIONS     Scheduled Meds:    sodium bicarbonate  650 mg Oral BID    lacosamide  100 mg Oral BID    risperiDONE  0.5 mg Oral BID

## 2024-01-22 NOTE — CARE COORDINATION
SW following for HD needs. Patient receives HD at [facility]. SW provided update to clinic and reviewed patient's current status. At this time, patient not appropriate to return to OP HD due to mentation and mobility needs. Recommend SNF w/onsite HD. Notified CM of information.

## 2024-01-22 NOTE — PROGRESS NOTES
[facility]  Office: [phone]  Arya Prasad DO, Jim Sesay, DO, Harlan More DO, Nikolay Cummings DO, Sky Washington MD, Adina Lara MD, Raquel Morales MD, Tonia Rosado MD,  Compa Kessler MD, Lonny Boles MD, Trung De Dios MD,  Rosy Blackmon DO, Sole Luna MD, Berry Miller MD, Saúl Prasad DO, Jessie Jeffers MD,  Ced Dixon DO, Sharyn Bae MD, Wendy Monsalve MD, Reta Cisneros MD, Reji Baptiste MD,  Brandon Molina MD, Ventura Hill MD, Brooke Matos MD, Gordo Boyle MD, Fausto Joya MD, Amber Riddle MD, Jacob Rich DO, Hamlet Ozuna DO, Kortney Blackwood MD,  Ba Murdock MD, Shirley Waterhouse, CNP,  Elizabeth Omalley, CNP, Chuck Dougherty, CNP,  Jaqueline Inman, DNP, Kim Santizo, CNP, María Elena Torres, CNP, Jessenia Middleton CNP, Ssisy Gonzalez, CNP, Rita Gutierrez, CNP, Vivi De Jesus, PA-C, Juliana Munoz, PA-C, Patti Uriarte, CNP, Rashmi Cameron, CNS, Yesenia Solomon, CNP, July Jain, CNP, Tracy Schwab, CNP         [facility]   IN-PATIENT SERVICE   [facility]    Progress Note    1/22/2024    7:33 AM    Name:   Lupe Ojeda  MRN:     [id number]     Acct:      [id number]   Room:   0102/0102-01   Day:  6  Admit Date:  1/16/2024 11:30 PM    PCP:   Kayleigh Cardoso MD  Code Status:  Full Code    Subjective:     C/C: AMS  Interval History Status: slightly confused     Patient seen and examined at bedside, no acute events overnight.    Overall much more awake, oriented x 1  still confused and hallucinating  Blood pressure slightly elevated overnight  BS are intermittently elevated  Still on steroids  Patient vitals, labs and all providers notes were reviewed,from overnight shift and morning updates were noted and discussed with the nurse    Brief History:     As documented in the medical record:  \"The patient is a 73 y.o. who was admitted to the hospital for the management of  AMS   Patient presented as a direct  07/06/2022 04:40 AM    PHART 7.381 01/15/2024 12:32 PM    POCPCO2 44.8 07/06/2022 04:40 AM    ZYH5QJL 34.8 01/15/2024 12:32 PM    POCPO2 92.6 07/06/2022 04:40 AM    PO2ART 84.6 01/15/2024 12:32 PM    POCHCO3 28.5 07/06/2022 04:40 AM    JGJ5DMA 20.6 01/15/2024 12:32 PM    NBEA 4.5 01/15/2024 12:32 PM    PBEA 4.9 07/20/2022 04:08 PM    JTQW9HZN 97 07/06/2022 04:40 AM    J6STATBX 94.1 01/15/2024 12:32 PM    FIO2 INFORMATION NOT PROVIDED 01/19/2024 06:46 PM     Lab Results   Component Value Date/Time    SPECIAL R ARM 3.5ML 08/18/2023 02:07 PM     Lab Results   Component Value Date/Time    CULTURE NO GROWTH 01/09/2024 12:13 AM       Radiology:  No results found.    Physical Examination:        Physical Exam  Vitals and nursing note reviewed.   Constitutional:       General: She is not in acute distress.     Appearance: She is obese.   HENT:      Head: Normocephalic and atraumatic.   Eyes:      Conjunctiva/sclera: Conjunctivae normal.      Pupils: Pupils are equal, round, and reactive to light.   Cardiovascular:      Rate and Rhythm: Normal rate and regular rhythm.      Heart sounds: No murmur heard.  Pulmonary:      Effort: Pulmonary effort is normal. No accessory muscle usage or respiratory distress.      Breath sounds: No stridor. No decreased breath sounds, wheezing, rhonchi or rales.   Chest:      Comments: Tunneled cath  Abdominal:      General: Bowel sounds are normal. There is no distension.      Palpations: Abdomen is soft. Abdomen is not rigid.      Tenderness: There is no abdominal tenderness. There is no guarding.   Musculoskeletal:         General: No tenderness.      Right lower leg: Edema present.      Left lower leg: Edema present.   Skin:     General: Skin is warm and dry.      Findings: No erythema, lesion or rash.      Comments: Wound per attached media from admission   Neurological:      Cranial Nerves: No cranial nerve deficit.      Motor: Weakness present. No seizure activity.   Psychiatric:

## 2024-01-22 NOTE — PROGRESS NOTES
PALLIATIVE CARE NURSING ASSESSMENT    Patient: Lupe Ojeda  Room: 0102/0102-01    Reason For Consult   Goals of care evaluation  Distress management  Guidance and support  Facilitate communications  Assistance in coordinating care    Code Status: Full Code    Summary:  Palliative care follow up for support.  Followed up to see if any decisions had been made with regards to following with outpatient Palliative Care.  Spoke with daughter per phone.    Awaiting SNF with onsite hemodialysis placement.  Daughter relates it has been hard trying to find a SNF.  No surgery for meningioma planned.  No decisions regarding palliative care outpatient.  Palliative care will continue to follow for support and goals of care.     Impression: Lupe Ojeda is a 73 y.o. year old female  has a past medical history of Acute CVA (cerebrovascular accident) ([facility]), Acute kidney injury superimposed on CKD (HCC), Acute on chronic combined systolic (congestive) and diastolic (congestive) heart failure (HCC), Acute respiratory failure with hypoxia and hypercapnia (HCC), JOY (acute kidney injury) ([facility]), Arthritis, Asthma, Atrial fibrillation, unspecified type ([facility]), Bilateral lower extremity edema, Bilateral pleural effusion, Bradycardia, Chronic diastolic congestive heart failure (HCC), Chronic ulcer of left leg with fat layer exposed (HCC), CKD stage 4 secondary to hypertension ([facility]), Dependence on renal dialysis (HCC), ESRD (end stage renal disease) ([facility]), Essential hypertension, Gastrointestinal hemorrhage, Gout, Hallucinations, Hard of hearing, Head contusion, Hyperkalemia, Iron deficiency anemia, unspecified, Leg ulcer, left, with fat layer exposed (HCC), Lymphedema, Meningioma (HCC), Meningioma, cerebral (HCC), Mild intermittent asthma, uncomplicated, Myocardial infarction (HCC), Nephrotic range proteinuria, NSTEMI (non-ST elevated myocardial infarction) ([facility]), Obesity (BMI 30-39.9), Old myocardial infarction, Paroxysmal atrial

## 2024-01-22 NOTE — PROGRESS NOTES
Description (Comments): wound #1 coccyx   Wound Image    Wound Etiology Deep tissue/Injury   Dressing Status New dressing applied   Wound Cleansed Soap and water   Dressing/Treatment Triad hydro/zinc oxide-based hydrophilic paste   Dressing Change Due 01/23/24   Wound Length (cm) 12 cm   Wound Width (cm) 8.5 cm   Wound Depth (cm) 0.2 cm   Wound Surface Area (cm^2) 102 cm^2   Change in Wound Size % (l*w) -7746.15   Wound Volume (cm^3) 20.4 cm^3   Wound Healing % -7746   Wound Assessment Dusky;Purple/maroon;Pink/red;Ruptured blister   Drainage Amount Small (< 25%)   Drainage Description Serosanguinous   Odor None   Marian-wound Assessment Intact;Blanchable erythema            Response to treatment:  Well tolerated by patient.            Plan:      Plan of Care:   [x]  Turn and reposition every 2 hours while in bed.     [x] Float heels off of bed with pillows under calves.     [] Heel protective boots (heel medix boots) at all times while in bed.     [] Sacral foam dressing to sacrococcygeal area. Use skin barrier film prior to placement. Peel back dressing, inspect skin beneath, and re-secure every shift. Change every 3 days and as needed if loose or soiled. Discontinue sacral foam if repeatedly soiled by incontinence.     [x] Apply Triad Hydrophilic Wound Dressing cream twice daily and as needed after incontinent episodes to the sacral and periarea skin.     [x] Perform routine incontinence care with use of foam cleanser.     [x] Use single layer moisture wicking underpad.     [x] Use comfort glide system and wedges to reposition patient.     [x] Keep the head of the bed below 30 degrees unless contraindicated.     [] Pressure reducing chair cushion while up to chair. Reposition every hour while in chair and limit chair time to 2 hour intervals.     [x] Encourage good nutritional intake and fluids. Consult dietician if needed.     Specialty Bed Required : Yes   [] Low Air Loss   [x] Pressure Redistribution  [] Fluid  Immersion  [] Bariatric  [] Total Pressure Relief  [] Other:      Discharge Plan:  TBD     Patient/Caregiver Teaching:  Ms Ojeda is disoriented. Cooperative with care. Assisted to right side lying after care. No family is currently at bedside.  Level of patient/caregiver understanding:    [] Indicates understanding                [] Needs reinforcement  [] Unsuccessful                                  [] Verbal Understanding  [] Demonstrated understanding        [x] No evidence of learning  [] Refused teaching                           [] N/A     Contact the Wound Ostomy RN on-call during working hours Monday-Friday [phone] via [employer] by searching \"wound\" under \"groups\" and selecting the on-call clinician. Sending messages via individual names will not reach a clinician.        Electronically signed by Era Henry RN, CWON on 1/22/2024 at 2:36 PM

## 2024-01-22 NOTE — PLAN OF CARE
Problem: Safety - Adult  Goal: Free from fall injury  1/22/2024 1145 by Shanthi Carson RN  Outcome: Progressing  1/22/2024 0449 by FAHAD COTTO  Outcome: Progressing     Problem: Chronic Conditions and Co-morbidities  Goal: Patient's chronic conditions and co-morbidity symptoms are monitored and maintained or improved  1/22/2024 1145 by Shanthi Carson RN  Outcome: Progressing  1/22/2024 0449 by FAHAD COTTO  Outcome: Progressing  Flowsheets (Taken 1/21/2024 2000)  Care Plan - Patient's Chronic Conditions and Co-Morbidity Symptoms are Monitored and Maintained or Improved:   Monitor and assess patient's chronic conditions and comorbid symptoms for stability, deterioration, or improvement   Collaborate with multidisciplinary team to address chronic and comorbid conditions and prevent exacerbation or deterioration   Update acute care plan with appropriate goals if chronic or comorbid symptoms are exacerbated and prevent overall improvement and discharge     Problem: Discharge Planning  Goal: Discharge to home or other facility with appropriate resources  1/22/2024 1145 by Shanthi Carson RN  Outcome: Progressing  1/22/2024 0449 by FAHAD COTTO  Outcome: Progressing  Flowsheets (Taken 1/21/2024 2000)  Discharge to home or other facility with appropriate resources:   Identify barriers to discharge with patient and caregiver   Arrange for needed discharge resources and transportation as appropriate   Identify discharge learning needs (meds, wound care, etc)   Arrange for interpreters to assist at discharge as needed   Refer to discharge planning if patient needs post-hospital services based on physician order or complex needs related to functional status, cognitive ability or social support system     Problem: Skin/Tissue Integrity  Goal: Absence of new skin breakdown  Description: 1.  Monitor for areas of redness and/or skin breakdown  2.  Assess vascular access sites hourly  3.  Every 4-6 hours minimum:  Change  oxygen saturation probe site  4.  Every 4-6 hours:  If on nasal continuous positive airway pressure, respiratory therapy assess nares and determine need for appliance change or resting period.  1/22/2024 1145 by Shanthi Carson RN  Outcome: Progressing  1/22/2024 0449 by FAHAD COTTO  Outcome: Progressing     Problem: Respiratory - Adult  Goal: Achieves optimal ventilation and oxygenation  1/22/2024 1145 by Shanthi Carson RN  Outcome: Progressing  1/22/2024 0449 by FAHAD CTOTO  Outcome: Progressing     Problem: Pain  Goal: Verbalizes/displays adequate comfort level or baseline comfort level  1/22/2024 1145 by Shanthi Carson RN  Outcome: Progressing  1/22/2024 0449 by FAHAD COTTO  Outcome: Progressing

## 2024-01-22 NOTE — PLAN OF CARE
Problem: Safety - Adult  Goal: Free from fall injury  Outcome: Progressing     Problem: Chronic Conditions and Co-morbidities  Goal: Patient's chronic conditions and co-morbidity symptoms are monitored and maintained or improved  Outcome: Progressing  Flowsheets (Taken 1/21/2024 2000)  Care Plan - Patient's Chronic Conditions and Co-Morbidity Symptoms are Monitored and Maintained or Improved:   Monitor and assess patient's chronic conditions and comorbid symptoms for stability, deterioration, or improvement   Collaborate with multidisciplinary team to address chronic and comorbid conditions and prevent exacerbation or deterioration   Update acute care plan with appropriate goals if chronic or comorbid symptoms are exacerbated and prevent overall improvement and discharge     Problem: Discharge Planning  Goal: Discharge to home or other facility with appropriate resources  Outcome: Progressing  Flowsheets (Taken 1/21/2024 2000)  Discharge to home or other facility with appropriate resources:   Identify barriers to discharge with patient and caregiver   Arrange for needed discharge resources and transportation as appropriate   Identify discharge learning needs (meds, wound care, etc)   Arrange for interpreters to assist at discharge as needed   Refer to discharge planning if patient needs post-hospital services based on physician order or complex needs related to functional status, cognitive ability or social support system     Problem: Skin/Tissue Integrity  Goal: Absence of new skin breakdown  Description: 1.  Monitor for areas of redness and/or skin breakdown  2.  Assess vascular access sites hourly  3.  Every 4-6 hours minimum:  Change oxygen saturation probe site  4.  Every 4-6 hours:  If on nasal continuous positive airway pressure, respiratory therapy assess nares and determine need for appliance change or resting period.  Outcome: Progressing     Problem: Respiratory - Adult  Goal: Achieves optimal

## 2024-01-22 NOTE — PROGRESS NOTES
Physical Therapy  DATE: 2024  NAME: Lupe Ojeda  MRN: [id number]   : 1950    Discharge Recommendations: Continue to Assess, needs further therapy    Subjective: Pt talking randomly throughout session  Pain: no pain noted except with LLE AAROM, not rated  Patient follows: Some Commands  Is patient on ventilator: No  Is patient on sedation: No  Precautions: pt bedridden at baseline    Therapeutic exercises:  UE/LE(s)  Bilateral Active-assist range of motion all planes x 15 reps  bilateral gastrocnemius stretching 2 reps x 20 seconds  Pt providing slight assist at times, improved tolerance for moving LLE with reps.  Pt just finished bathing and was repositioned in bed, did not complete bed mobility this date    Goals  Short Term Goals  Short Term Goal 1: Pt will perform sit<>stand transfer with mod-A.  Short Term Goal 2: Pt will perform supine<>sit transfer with min-A.  Short Term Goal 3: Pt will demonstrate fair+ dynamic standing balance to decrease fall risk.  Short Term Goal 4: Pt will tolerate a 35 minute therapy session to promote increased endurance.  Short Term Goal 5: Pt will ambulate 5 feet with a RW and min-A.    Plan: Progress functional mobility as medically appropriate.   Time In: 955  Time Out: 1005  Time Coded Minutes (treatment minutes): 10  Rehab Potential: guarded  Treatments/week: 3-5x/week    Leny Vaughan PT

## 2024-01-23 VITALS
TEMPERATURE: 97.4 F | DIASTOLIC BLOOD PRESSURE: 65 MMHG | BODY MASS INDEX: 25.83 KG/M2 | SYSTOLIC BLOOD PRESSURE: 124 MMHG | WEIGHT: 132.28 LBS | RESPIRATION RATE: 18 BRPM | OXYGEN SATURATION: 98 % | HEART RATE: 79 BPM

## 2024-01-23 LAB
ANION GAP SERPL CALCULATED.3IONS-SCNC: 12 MMOL/L (ref 9–17)
BASOPHILS # BLD: 0 K/UL (ref 0–0.2)
BASOPHILS NFR BLD: 0 % (ref 0–2)
BUN SERPL-MCNC: 64 MG/DL (ref 8–23)
CALCIUM SERPL-MCNC: 8 MG/DL (ref 8.6–10.4)
CHLORIDE SERPL-SCNC: 104 MMOL/L (ref 98–107)
CO2 SERPL-SCNC: 28 MMOL/L (ref 20–31)
CREAT SERPL-MCNC: 3.9 MG/DL (ref 0.5–0.9)
EOSINOPHIL # BLD: 0 K/UL (ref 0–0.4)
EOSINOPHILS RELATIVE PERCENT: 0 % (ref 1–4)
ERYTHROCYTE [DISTWIDTH] IN BLOOD BY AUTOMATED COUNT: 17 % (ref 11.8–14.4)
GFR SERPL CREATININE-BSD FRML MDRD: 12 ML/MIN/1.73M2
GLUCOSE BLD-MCNC: 171 MG/DL (ref 65–105)
GLUCOSE BLD-MCNC: 180 MG/DL (ref 65–105)
GLUCOSE SERPL-MCNC: 199 MG/DL (ref 70–99)
HCT VFR BLD AUTO: 32.7 % (ref 36.3–47.1)
HGB BLD-MCNC: 10.7 G/DL (ref 11.9–15.1)
IMM GRANULOCYTES # BLD AUTO: 0.57 K/UL (ref 0–0.3)
IMM GRANULOCYTES NFR BLD: 5 %
LYMPHOCYTES NFR BLD: 0.46 K/UL (ref 1–4.8)
LYMPHOCYTES RELATIVE PERCENT: 4 % (ref 24–44)
MCH RBC QN AUTO: 29.6 PG (ref 25.2–33.5)
MCHC RBC AUTO-ENTMCNC: 32.7 G/DL (ref 28.4–34.8)
MCV RBC AUTO: 90.3 FL (ref 82.6–102.9)
MONOCYTES NFR BLD: 0.57 K/UL (ref 0.1–0.8)
MONOCYTES NFR BLD: 5 % (ref 1–7)
MORPHOLOGY: ABNORMAL
NEUTROPHILS NFR BLD: 86 % (ref 36–66)
NEUTS SEG NFR BLD: 9.8 K/UL (ref 1.8–7.7)
NRBC BLD-RTO: 0 PER 100 WBC
PLATELET # BLD AUTO: 88 K/UL (ref 138–453)
PMV BLD AUTO: 12.7 FL (ref 8.1–13.5)
POTASSIUM SERPL-SCNC: 5.2 MMOL/L (ref 3.7–5.3)
RBC # BLD AUTO: 3.62 M/UL (ref 3.95–5.11)
SARS-COV-2 RDRP RESP QL NAA+PROBE: NOT DETECTED
SODIUM SERPL-SCNC: 144 MMOL/L (ref 135–144)
SPECIMEN DESCRIPTION: NORMAL
WBC OTHER # BLD: 11.4 K/UL (ref 3.5–11.3)

## 2024-01-23 PROCEDURE — 6370000000 HC RX 637 (ALT 250 FOR IP)

## 2024-01-23 PROCEDURE — 82947 ASSAY GLUCOSE BLOOD QUANT: CPT

## 2024-01-23 PROCEDURE — 6360000002 HC RX W HCPCS: Performed by: INTERNAL MEDICINE

## 2024-01-23 PROCEDURE — 6370000000 HC RX 637 (ALT 250 FOR IP): Performed by: PSYCHIATRY & NEUROLOGY

## 2024-01-23 PROCEDURE — 99232 SBSQ HOSP IP/OBS MODERATE 35: CPT | Performed by: STUDENT IN AN ORGANIZED HEALTH CARE EDUCATION/TRAINING PROGRAM

## 2024-01-23 PROCEDURE — 6370000000 HC RX 637 (ALT 250 FOR IP): Performed by: FAMILY MEDICINE

## 2024-01-23 PROCEDURE — 90935 HEMODIALYSIS ONE EVALUATION: CPT

## 2024-01-23 PROCEDURE — 6370000000 HC RX 637 (ALT 250 FOR IP): Performed by: STUDENT IN AN ORGANIZED HEALTH CARE EDUCATION/TRAINING PROGRAM

## 2024-01-23 PROCEDURE — 85025 COMPLETE CBC W/AUTO DIFF WBC: CPT

## 2024-01-23 PROCEDURE — 87635 SARS-COV-2 COVID-19 AMP PRB: CPT

## 2024-01-23 PROCEDURE — 80048 BASIC METABOLIC PNL TOTAL CA: CPT

## 2024-01-23 PROCEDURE — 6370000000 HC RX 637 (ALT 250 FOR IP): Performed by: INTERNAL MEDICINE

## 2024-01-23 PROCEDURE — 99232 SBSQ HOSP IP/OBS MODERATE 35: CPT | Performed by: INTERNAL MEDICINE

## 2024-01-23 PROCEDURE — 36415 COLL VENOUS BLD VENIPUNCTURE: CPT

## 2024-01-23 PROCEDURE — 99239 HOSP IP/OBS DSCHRG MGMT >30: CPT | Performed by: PSYCHIATRY & NEUROLOGY

## 2024-01-23 RX ORDER — DEXAMETHASONE SODIUM PHOSPHATE 4 MG/ML
2 INJECTION, SOLUTION INTRA-ARTICULAR; INTRALESIONAL; INTRAMUSCULAR; INTRAVENOUS; SOFT TISSUE EVERY 8 HOURS
Status: DISCONTINUED | OUTPATIENT
Start: 2024-01-23 | End: 2024-01-23 | Stop reason: HOSPADM

## 2024-01-23 RX ORDER — RISPERIDONE 0.5 MG/1
0.5 TABLET, ORALLY DISINTEGRATING ORAL 2 TIMES DAILY
Qty: 60 TABLET | Refills: 3 | Status: ON HOLD | OUTPATIENT
Start: 2024-01-23

## 2024-01-23 RX ORDER — DEXAMETHASONE 4 MG/1
2 TABLET ORAL 2 TIMES DAILY
Status: DISCONTINUED | OUTPATIENT
Start: 2024-01-24 | End: 2024-01-23 | Stop reason: HOSPADM

## 2024-01-23 RX ADMIN — SODIUM BICARBONATE 650 MG: 648 TABLET ORAL at 13:39

## 2024-01-23 RX ADMIN — CETIRIZINE HYDROCHLORIDE 5 MG: 10 TABLET ORAL at 13:39

## 2024-01-23 RX ADMIN — LACOSAMIDE 100 MG: 100 TABLET, FILM COATED ORAL at 13:34

## 2024-01-23 RX ADMIN — METOPROLOL SUCCINATE 100 MG: 25 TABLET, EXTENDED RELEASE ORAL at 13:34

## 2024-01-23 RX ADMIN — LACOSAMIDE 50 MG: 50 TABLET, FILM COATED ORAL at 13:44

## 2024-01-23 RX ADMIN — DILTIAZEM HYDROCHLORIDE 60 MG: 60 TABLET, FILM COATED ORAL at 13:43

## 2024-01-23 RX ADMIN — AMIODARONE HYDROCHLORIDE 100 MG: 200 TABLET ORAL at 13:35

## 2024-01-23 RX ADMIN — CLOPIDOGREL BISULFATE 75 MG: 75 TABLET ORAL at 13:43

## 2024-01-23 RX ADMIN — HEPARIN SODIUM 1600 UNITS: 1000 INJECTION INTRAVENOUS; SUBCUTANEOUS at 12:42

## 2024-01-23 RX ADMIN — INSULIN GLARGINE 10 UNITS: 100 INJECTION, SOLUTION SUBCUTANEOUS at 13:35

## 2024-01-23 RX ADMIN — LEVOTHYROXINE SODIUM 25 MCG: 50 TABLET ORAL at 13:34

## 2024-01-23 RX ADMIN — HEPARIN SODIUM 1600 UNITS: 1000 INJECTION INTRAVENOUS; SUBCUTANEOUS at 12:41

## 2024-01-23 RX ADMIN — DILTIAZEM HYDROCHLORIDE 60 MG: 60 TABLET, FILM COATED ORAL at 05:08

## 2024-01-23 RX ADMIN — ASPIRIN 81 MG: 81 TABLET, COATED ORAL at 13:35

## 2024-01-23 RX ADMIN — HEPARIN SODIUM 5000 UNITS: 5000 INJECTION INTRAVENOUS; SUBCUTANEOUS at 05:09

## 2024-01-23 RX ADMIN — RISPERIDONE 0.5 MG: 0.5 TABLET, ORALLY DISINTEGRATING ORAL at 13:39

## 2024-01-23 RX ADMIN — DEXAMETHASONE SODIUM PHOSPHATE 2 MG: 4 INJECTION, SOLUTION INTRA-ARTICULAR; INTRALESIONAL; INTRAMUSCULAR; INTRAVENOUS; SOFT TISSUE at 05:12

## 2024-01-23 RX ADMIN — FUROSEMIDE 80 MG: 40 TABLET ORAL at 13:39

## 2024-01-23 ASSESSMENT — PAIN SCALES - GENERAL
PAINLEVEL_OUTOF10: 0

## 2024-01-23 NOTE — DISCHARGE INSTR - COC
Continuity of Care Form    Patient Name: Lupe Nelson   :  1950  MRN:  [id number]    Admit date:  2024  Discharge date:  24    Code Status Order: Full Code   Advance Directives:     Admitting Physician:  Bushra Palmer DO  PCP: Kayleigh Cardoso MD    Discharging Nurse: Shanthi DELGADO RN  Discharging Hospital Unit/Room#: 0102/0102-01  Discharging Unit Phone Number: [phone]    Emergency Contact:   Extended Emergency Contact Information  Primary Emergency Contact: Sea Nelson  Address: [address] [place] [address]  Home Phone: [phone]  Work Phone: [phone]  Mobile Phone: [phone]  Relation: Child  Preferred language: English   needed? No  Secondary Emergency Contact: CYRIL NELSON  Home Phone: [phone]  Relation: Child  Preferred language: English    Past Surgical History:  Past Surgical History:   Procedure Laterality Date    AV FISTULA CREATION Right     CARDIAC CATHETERIZATION      CARDIAC CATHETERIZATION  2023    CARDIAC PROCEDURE N/A 2023    Left heart cath / coronary angiography w grafts performed by Ken Mina MD at [facility] CARDIAC CATH LAB    CARDIAC PROCEDURE N/A 2023    Percutaneous coronary intervention performed by Ken Mina MD at [facility] CARDIAC CATH LAB    CARDIAC PROCEDURE N/A 2023    Intravascular ultrasound performed by Ken Mina MD at [facility] CARDIAC CATH LAB    CARDIOVERSION      COLONOSCOPY N/A 03/15/2022    COLONOSCOPY DIAGNOSTIC performed by Manjeet Wall MD at [facility] ENDO    CORONARY ARTERY BYPASS GRAFT      x 3, Dr. chavis    INTUBATION  2022         IR NONTUNNELED VASCULAR CATHETER  2022    IR NONTUNNELED VASCULAR CATHETER 2022 SEBASTIAN Mirza [facility] SPECIAL PROCEDURES    IR TUNNELED CATHETER PLACEMENT GREATER THAN 5 YEARS  2022    IR TUNNELED CATHETER PLACEMENT GREATER THAN 5 YEARS 2022 [facility] SPECIAL PROCEDURES    THORACENTESIS   1600   Marian-wound Assessment Dry/flaky 01/17/24 1600   Margins Attached edges 01/12/24 1630   Wound Thickness Description not for Pressure Injury Partial thickness 01/12/24 0800   Number of days: 35       Wound 01/17/24 Heel Right (Active)   Wound Image   01/17/24 0840   Wound Etiology Pressure Stage 1 01/22/24 2000   Dressing Status Clean;Dry;Intact 01/22/24 2000   Wound Cleansed Soap and water 01/18/24 2100   Dressing/Treatment Foam 01/22/24 2000   Dressing Change Due 01/23/24 01/22/24 2000   Wound Length (cm) 3.5 cm 01/17/24 0840   Wound Width (cm) 4.5 cm 01/17/24 0840   Wound Depth (cm) 0 cm 01/17/24 0840   Wound Surface Area (cm^2) 15.75 cm^2 01/17/24 0840   Wound Volume (cm^3) 0 cm^3 01/17/24 0840   Wound Assessment Non-blanchable erythema 01/22/24 2000   Drainage Amount None (dry) 01/18/24 0400   Odor None 01/18/24 0400   Marian-wound Assessment Intact 01/17/24 1600   Number of days: 6        Elimination:  Continence:   Bowel: No  Bladder: No  Urinary Catheter: None   Colostomy/Ileostomy/Ileal Conduit: No       Date of Last BM: 1/23/2024  No intake or output data in the 24 hours ending 01/23/24 0857  No intake/output data recorded.    Safety Concerns:     At Risk for Falls and History of Seizures    Impairments/Disabilities:      Hearing    Nutrition Therapy:  Current Nutrition Therapy:   - Oral Diet:  General Cut up food for the patient     Routes of Feeding: Oral  Liquids: Thin Liquids  Daily Fluid Restriction: no  Last Modified Barium Swallow with Video (Video Swallowing Test): not done    Treatments at the Time of Hospital Discharge:   Respiratory Treatments: None  Oxygen Therapy:  is not on home oxygen therapy.  Ventilator:    - No ventilator support    Rehab Therapies: Physical Therapy and Occupational Therapy  Weight Bearing Status/Restrictions: No weight bearing restrictions  Other Medical Equipment (for information only, NOT a DME order):  None  Other Treatments: SACRUM: [facility] Hydrophilic Wound

## 2024-01-23 NOTE — PROGRESS NOTES
Dialysis Post Treatment Note  Vitals:    01/23/24 1330   BP: 124/65   Pulse: 79   Resp:    Temp:    SpO2:      Pre-Weight = 62.1KG  Post-weight = Weight - Scale: 60 kg (132 lb 4.4 oz)  Total Liters Processed = Blood Volume Processed (Liters): 80.61 L  Rinseback Volume (mL) = Rinseback Volume (ml): 300 ml  Net Removal (mL) =  2000mL  Type of access used=RT perm cath  Length of treatment=3.5 hours    Pt tolerated tx well; no issues noted.

## 2024-01-23 NOTE — PLAN OF CARE
Problem: Safety - Adult  Goal: Free from fall injury  1/23/2024 0958 by Shanthi Carson RN  Outcome: Progressing  1/23/2024 0459 by Juliana Sosa RN  Outcome: Progressing  Flowsheets (Taken 1/22/2024 2000)  Free From Fall Injury: Instruct family/caregiver on patient safety     Problem: Chronic Conditions and Co-morbidities  Goal: Patient's chronic conditions and co-morbidity symptoms are monitored and maintained or improved  1/23/2024 0958 by Shanthi Carson RN  Outcome: Progressing  1/23/2024 0459 by Juliana Sosa RN  Outcome: Progressing  Flowsheets (Taken 1/22/2024 2000)  Care Plan - Patient's Chronic Conditions and Co-Morbidity Symptoms are Monitored and Maintained or Improved: Monitor and assess patient's chronic conditions and comorbid symptoms for stability, deterioration, or improvement     Problem: Discharge Planning  Goal: Discharge to home or other facility with appropriate resources  Outcome: Progressing  Flowsheets (Taken 1/22/2024 2000 by Juliana Sosa RN)  Discharge to home or other facility with appropriate resources: Identify barriers to discharge with patient and caregiver     Problem: Skin/Tissue Integrity  Goal: Absence of new skin breakdown  Description: 1.  Monitor for areas of redness and/or skin breakdown  2.  Assess vascular access sites hourly  3.  Every 4-6 hours minimum:  Change oxygen saturation probe site  4.  Every 4-6 hours:  If on nasal continuous positive airway pressure, respiratory therapy assess nares and determine need for appliance change or resting period.  Outcome: Progressing     Problem: Respiratory - Adult  Goal: Achieves optimal ventilation and oxygenation  Outcome: Progressing     Problem: Pain  Goal: Verbalizes/displays adequate comfort level or baseline comfort level  Outcome: Progressing

## 2024-01-23 NOTE — PROGRESS NOTES
counseling/discussion     DNR (do not resuscitate) discussion     ACP (advance care planning)     Palliative care encounter     Acute cystitis with hematuria     Permanent atrial fibrillation (HCC)     Ischemic cardiomyopathy     History of end stage renal disease     Acute encephalopathy     Vasogenic cerebral edema (HCC)     Mass of temporoparietal region of brain     Encephalopathy     Delirium due to multiple etiologies     Cognitive impairment     Hallucinations     Preop cardiovascular exam     ESRD (end stage renal disease) (HCC)      Plan of Treatment:   Fluid volume per HD   Hx of CAD with CABG. Mid left cx stent 11/2023. If surgery needed, can hold  Plavix and asa and start on IV cangrelor until able to restart plavix,    Afib not on AC due to GI bleed. Continue Amiodarone and Cardizem.   Continue ASA and Lipitor   Mod to high risk for surgery per Dr. Melton  Nephrology following - appreciate recommendations.   Cardiology will sign off at this time. Please contact with any additional questions     Electronically signed by MAL Boone CNP on 1/23/2024 at 9:13 AM  [facility]  [phone]

## 2024-01-23 NOTE — PROGRESS NOTES
NEPHROLOGY PROGRESS NOTE      ASSESSMENT     ESRD on maintenance hemodialysis Tuesday Thursday Saturday at [facility] using right IJ tunnel catheter and follows up with Dr. Novak  Admitted with altered mental sensorium/acute metabolic encephalopathy suspected worsening progressive decline dementia versus meningioma with worsening vasogenic edema  Meningioma with vasogenic edema.  No surgical intervention planned  Seizure disorder  CABG with ejection fraction 40%  Essential hypertension  A-fib with RVR  Hypothyroidism    PLAN     Hemodialysis today.  Orders reviewed with the nursing staff.  Continue current medications  Okay to discharge from nephrology standpoint      SUBJECTIVE     Transferred from [facility] for neurological workup in view of worsening of mentation with background history of meningioma and vasogenic edema along with underlying delirium.  Septic workup negative.    Mentation continues to be remain the same.  Scheduled for hemodialysis today no acute hemodynamic issues overnight.  Patient has been seen by palliative but family not ready for palliative care at this time.  Working towards placement in ECF.    OBJECTIVE     Vitals:    01/23/24 0008 01/23/24 0405 01/23/24 0516 01/23/24 0800   BP: (!) 144/66 (!) 132/40  (!) 147/65   Pulse: 74 60  70   Resp: 16 16  16   Temp: 97.6 °F (36.4 °C) 97.9 °F (36.6 °C)  (!) 96.6 °F (35.9 °C)   TempSrc: Axillary Axillary  Oral   SpO2: 97% 99%  98%   Weight:   58.2 kg (128 lb 4.9 oz)      24HR INTAKE/OUTPUT:  No intake or output data in the 24 hours ending 01/23/24 0828    General appearance: in no acute distress  HEENT: PERRLA  Respiratory::vesicular breath sounds,no wheeze/crackles  Cardiovascular:S1 S2 normal,no gallop or organic murmur.  Abdomen:Non tender/non distended.Bowel sounds present  Extremities: No Cyanosis or Clubbing,Lower extremity edema  Neurological: Moves extremities/AMS      MEDICATIONS     Scheduled Meds:    [START ON 1/24/2024]  pill, does not take on dialysis days.  amiodarone (PACERONE) 100 MG tablet, Take 1 tablet by mouth every morning  aspirin 81 MG EC tablet, Take 1 tablet by mouth daily Always take with food, stop taking it if any black stools or rectal bleeding  clopidogrel (PLAVIX) 75 MG tablet, Take 1 tablet by mouth daily  desloratadine (CLARINEX) 5 MG tablet, Take 1 tablet by mouth daily  ketorolac (ACULAR) 0.5 % ophthalmic solution, INSTILL 1 DROP INTO AFFECTED EYE 4 TIMES DAILY STARTING 1 DAY BEFORE SURGERY  moxifloxacin (VIGAMOX) 0.5 % ophthalmic solution, INSTILL 1 DROP INTO AFFECTED EYE 4 TIMES DAILY STARTING 1 DAY BEFORE SURGERY  prednisoLONE acetate (PRED FORTE) 1 % ophthalmic suspension, INSTILL 1 DROP INTO AFFECTED EYE 4 TIMES DAILY STARTING 1 DAY BEFORE SURGERY  mirtazapine (REMERON) 15 MG tablet, Take 1 tablet by mouth nightly Dose increased 11/17/2023 . 30 days RX till the mail order comes  mupirocin (BACTROBAN) 2 % ointment, Apply topically 3 times daily x 10 days.  famotidine (PEPCID) 20 MG tablet, Take 1 tablet by mouth every morning (before breakfast) Replacing omeprazole due to end-stage kidney disease  [DISCONTINUED] lamoTRIgine (LAMICTAL) 25 MG tablet, Take 1 tablet by mouth 2 times daily  atorvastatin (LIPITOR) 40 MG tablet, Take 1 tablet by mouth every evening Take 1 tablet by mouth once daily  melatonin 5 MG TABS tablet, Take 1 tablet by mouth every evening For insomnia  fluticasone-salmeterol (ADVAIR HFA) 115-21 MCG/ACT inhaler, Inhale 2 puffs into the lungs 2 times daily Maintenance inhaler  tiotropium (SPIRIVA RESPIMAT) 2.5 MCG/ACT AERS inhaler, Inhale 2 puffs into the lungs every morning (before breakfast)  lidocaine (LIDODERM) 5 %, Place 1 patch onto the skin daily 12 hours on, 12 hours off.  lidocaine (LIDOCAINE PAIN RELIEF) 4 % external patch, 1 patch as needed Externally Once a day  Wound Dressings (ZENIFOAM GENTLE 9\"X9\"/SACRAL) PADS, Apply 1 each topically daily  nystatin (MYCOSTATIN) 160301

## 2024-01-23 NOTE — PLAN OF CARE
Problem: Safety - Adult  Goal: Free from fall injury  Outcome: Progressing  Flowsheets (Taken 1/22/2024 2000)  Free From Fall Injury: Instruct family/caregiver on patient safety     Problem: Chronic Conditions and Co-morbidities  Goal: Patient's chronic conditions and co-morbidity symptoms are monitored and maintained or improved  Outcome: Progressing  Flowsheets (Taken 1/22/2024 2000)  Care Plan - Patient's Chronic Conditions and Co-Morbidity Symptoms are Monitored and Maintained or Improved: Monitor and assess patient's chronic conditions and comorbid symptoms for stability, deterioration, or improvement

## 2024-01-23 NOTE — PROGRESS NOTES
[facility]  Office: [phone]  Arya Prasad DO, Jim Sesay DO, Harlan More DO, Nikolay Cummings DO, Sky Washington MD, Adina Lara MD, Raquel Morales MD, Tonia Rosado MD,  Compa Kessler MD, Lonny Boles MD, Trung De Dios MD,  Rosy Blackmon DO, Sole Luna MD, Berry Miller MD, Saúl Prasad DO, Jessie Jeffers MD,  Ced Dixon DO, Sharyn Bae MD, Wendy Monsalve MD, Reta Cisneros MD, Reji Baptiste MD,  Brandon Molina MD, Ventura Hill MD, Brooke Matos MD, Gordo Boyle MD, Fausto Joya MD, Amber Riddle MD, Jacob Rich DO, Hamlet Ozuna DO, Kortney Blackwood MD,  Ba Murdock MD, Shirley Waterhouse, CNP,  Elizabeth Omalley, CNP, Chuck Dougherty, CNP,  Jaqueline Inman, WENDY, Kim Santizo, CNP, María Elena Torres, CNP, Jessenia Middleton CNP, Sissy Gonzalez, CNP, Rita Gutierrez, CNP, Vivi De Jesus, PA-C, Juliana Munoz, PA-C, Patti Uriarte, CNP, Rashmi Cameron, CNS, Yesenia Solomon, CNP, July Jain, CNP, Tracy Schwab, CNP         [facility]   IN-PATIENT SERVICE   [facility]    Progress Note    1/23/2024    9:47 AM    Name:   Lupe Ojeda  MRN:     [id number]     Acct:      [id number]   Room:   0102/0102-01   Day:  7  Admit Date:  1/16/2024 11:30 PM    PCP:   Kayleigh Cardoso MD  Code Status:  Full Code    Subjective:     C/C: AMS  Interval History Status: not changed.     Patient seen and examined at bedside this morning before heading to dialysis. Patient unchanged form yesterday. Still pleasantly confused. No acute events overnight.    Brief History:     As documented in the medical record:  \"The patient is a 73 y.o. who was admitted to the hospital for the management of  AMS   Patient presented as a direct transfer from [place]. History was obtained from electronic medical records since patient is delirious and is not able to provide any meaningful history.  She has extensive PMH including CAD s/p CABG, CHF with reduced  x-ray with no infiltrate   -CRP and Pro-Renzo not elevated  -Continue with Lantus for now along with sliding scale as patient receiving IV steroids  -Discussed with neurology resident, ok for discharge today   - Meningioma management per primary & NS  - Continue Decadron 2 mg every 8 hours for 1 week followed by 2 mg twice daily thereafter as per neurosurgery recommendations.  -A-fib management per cardiology, on amiodarone  -Continue with behavioral intervention  -Continue DVT prophylaxis , generally as long as PLT > 50 K  -Continue appropriate chronic home meds  - HD per nephro  -Rest of management per primary  -Will sign off at this time    Amber Riddle MD  1/23/2024  9:47 AM

## 2024-01-23 NOTE — CARE COORDINATION
Transitional planning: VM from [facility] with [facility]. They have approval and can admit anytime. She will need a negative Covid.    0835 Phone call to [facility] with [facility]. Obtained report #[phone] and fax #[phone].     2698 PS message to Dr. Kraus requesting rapid covid order, randi and med rec to be completed.    0959 Left HIPAA compliant message for vivian [facility] that [facility] and [facility] will be later today after dialysis has been completed.    1117 Transport request faxed to [facility] with 3 pm requested transport time.    1334 VM from [place] with [facility].Transport will be  at 1430 with [facility].    1431 Phone call to [facility] with [facility] that transport is here now to take patient.    1438 RANDI/AVS faxed to [facility].    1441 Phone call to [facility]ian [facility]se[facility] to notify of transport. Obtained her address to send [facility].    Discharge Report    [facility]  Clinical Case Management Department  Written by: Jeannette Alvarado RN    Patient Name: Lupe Ojeda  Attending Provider: Mauri Borges DO  Admit Date: 2024 11:30 PM  MRN: [id number]  Account: [id number]                     : 1950  Discharge Date:       Disposition: [facility]    Jeannette Alvarado RN

## 2024-01-23 NOTE — PROGRESS NOTES
Writer called report to [facility]. All questions answered. PIV removed. Personal belongings packed up and sent with the patient.

## 2024-01-23 NOTE — PLAN OF CARE
Problem: Safety - Adult  Goal: Free from fall injury  1/23/2024 1456 by Shanthi Carson RN  Outcome: Adequate for Discharge  1/23/2024 0958 by Shanthi Carson RN  Outcome: Progressing  1/23/2024 0459 by Juliana Sosa RN  Outcome: Progressing  Flowsheets (Taken 1/22/2024 2000)  Free From Fall Injury: Instruct family/caregiver on patient safety     Problem: Chronic Conditions and Co-morbidities  Goal: Patient's chronic conditions and co-morbidity symptoms are monitored and maintained or improved  1/23/2024 1456 by Shanthi Carson RN  Outcome: Adequate for Discharge  1/23/2024 0958 by Shanthi Carson RN  Outcome: Progressing  1/23/2024 0459 by Juliana Sosa RN  Outcome: Progressing  Flowsheets (Taken 1/22/2024 2000)  Care Plan - Patient's Chronic Conditions and Co-Morbidity Symptoms are Monitored and Maintained or Improved: Monitor and assess patient's chronic conditions and comorbid symptoms for stability, deterioration, or improvement     Problem: Discharge Planning  Goal: Discharge to home or other facility with appropriate resources  1/23/2024 1456 by Shanthi Carson RN  Outcome: Adequate for Discharge  1/23/2024 0958 by Shanthi Carson RN  Outcome: Progressing  Flowsheets (Taken 1/22/2024 2000 by Juliana Sosa RN)  Discharge to home or other facility with appropriate resources: Identify barriers to discharge with patient and caregiver     Problem: Skin/Tissue Integrity  Goal: Absence of new skin breakdown  Description: 1.  Monitor for areas of redness and/or skin breakdown  2.  Assess vascular access sites hourly  3.  Every 4-6 hours minimum:  Change oxygen saturation probe site  4.  Every 4-6 hours:  If on nasal continuous positive airway pressure, respiratory therapy assess nares and determine need for appliance change or resting period.  1/23/2024 1456 by Shanthi Carson RN  Outcome: Adequate for Discharge  1/23/2024 0958 by Shanthi Carson RN  Outcome: Progressing     Problem: Respiratory -

## 2024-01-23 NOTE — PROGRESS NOTES
smoking use included cigarettes. She started smoking about 34 years ago. She has a 2.5 pack-year smoking history. She has been exposed to tobacco smoke. She has never used smokeless tobacco. She reports that she does not currently use alcohol. She reports that she does not use drugs.    Medications:  Prior to Admission medications    Medication Sig Start Date End Date Taking? Authorizing Provider   dexAMETHasone (DECADRON) 4 MG tablet Take 0.5 tablets by mouth every 8 (eight) hours for 7 days, THEN 0.5 tablets in the morning and at bedtime for 14 days. 1/19/24 2/9/24 Yes Sergio Molina APRN - NP   lacosamide (VIMPAT) 100 MG TABS tablet Take 1 tablet by mouth 2 times daily for 60 days. Max Daily Amount: 200 mg 1/5/24 3/5/24  Melyssa Marshall MD   furosemide (LASIX) 80 MG tablet Take 1 tablet by mouth every other day Water pill, does not take on dialysis days. 12/11/23   Kayleigh Cardoso MD   amiodarone (PACERONE) 100 MG tablet Take 1 tablet by mouth every morning 12/11/23   Kayleigh Cardoso MD   aspirin 81 MG EC tablet Take 1 tablet by mouth daily Always take with food, stop taking it if any black stools or rectal bleeding 12/11/23   Kayleigh Cardoso MD   clopidogrel (PLAVIX) 75 MG tablet Take 1 tablet by mouth daily 11/29/23   Park Pereira APRN - CNP   desloratadine (CLARINEX) 5 MG tablet Take 1 tablet by mouth daily 10/22/23   Mino Berry MD   ketorolac (ACULAR) 0.5 % ophthalmic solution INSTILL 1 DROP INTO AFFECTED EYE 4 TIMES DAILY STARTING 1 DAY BEFORE SURGERY 10/27/23   Mino Berry MD   moxifloxacin (VIGAMOX) 0.5 % ophthalmic solution INSTILL 1 DROP INTO AFFECTED EYE 4 TIMES DAILY STARTING 1 DAY BEFORE SURGERY 10/27/23   Mino Berry MD   prednisoLONE acetate (PRED FORTE) 1 % ophthalmic suspension INSTILL 1 DROP INTO AFFECTED EYE 4 TIMES DAILY STARTING 1 DAY BEFORE SURGERY 10/27/23   Mino Berry MD   mirtazapine (REMERON) 15 MG tablet Take 1 tablet by  mouth nightly Dose increased 11/17/2023 . 30 days RX till the mail order comes 11/17/23   Kayleigh Cardoso MD   mupirocin (BACTROBAN) 2 % ointment Apply topically 3 times daily x 10 days. 11/17/23   Kayleigh Cardoso MD   famotidine (PEPCID) 20 MG tablet Take 1 tablet by mouth every morning (before breakfast) Replacing omeprazole due to end-stage kidney disease 10/27/23   Kayleigh Cardoso MD   atorvastatin (LIPITOR) 40 MG tablet Take 1 tablet by mouth every evening Take 1 tablet by mouth once daily 10/23/23   Kayleigh Cardoso MD   melatonin 5 MG TABS tablet Take 1 tablet by mouth every evening For insomnia 10/23/23   Kayleigh Cardoso MD   fluticasone-salmeterol (ADVAIR HFA) 115-21 MCG/ACT inhaler Inhale 2 puffs into the lungs 2 times daily Maintenance inhaler 10/23/23   Kayleigh Cardoso MD   tiotropium (SPIRIVA RESPIMAT) 2.5 MCG/ACT AERS inhaler Inhale 2 puffs into the lungs every morning (before breakfast) 10/23/23   Kayleigh Cardoso MD   lidocaine (LIDODERM) 5 % Place 1 patch onto the skin daily 12 hours on, 12 hours off. 10/18/23   Kayleigh Cardoso MD   lidocaine (LIDOCAINE PAIN RELIEF) 4 % external patch 1 patch as needed Externally Once a day    Provider, MD Mino   Wound Dressings (ZENIFOAM GENTLE 9\"X9\"/SACRAL) PADS Apply 1 each topically daily 10/16/23   Santo Pratt APRN - CNP   nystatin (MYCOSTATIN) 628506 UNIT/GM powder Apply 3 times daily. 10/16/23   Santo Pratt APRN - CNP   Lift Chair MISC by Does not apply route 8/23/23   Kayleigh Cardoso MD   metoprolol succinate (TOPROL XL) 25 MG extended release tablet Take 1 tablet by mouth every evening Hold if BP bellow 115/65. Stop metoprolol tartrate 8/10/23   Kayleigh Cardoso MD   hydrOXYzine HCl (ATARAX) 50 MG tablet Take 1 tablet by mouth daily as needed for Anxiety 8/10/23   Kayleigh Cardoso MD   albuterol (PROVENTIL) (2.5 MG/3ML) 0.083% nebulizer solution Take 3 mLs by nebulization every 6 hours as needed

## 2024-01-23 NOTE — PROGRESS NOTES
Dialysis Time Out  To be done by RN and tech or 2 RNs  Staff Names Bernard RN and Mayela RN    [x]  Identity of the patient using 2 patient identifiers  [x]  Consent for treatment  [x]  Equipment-proper machine and dialyzer  [x]  B-Hep B status  [x]  Orders- to include bath, blood flow, dialyzer, time and fluid removal  [x]  Access-Correct site and in working order  [x]  Time for patient to ask questions.

## 2024-01-24 ENCOUNTER — FOLLOWUP TELEPHONE ENCOUNTER (OUTPATIENT)
Dept: SOCIAL WORK | Age: 74
End: 2024-01-24

## 2024-01-26 ENCOUNTER — APPOINTMENT (OUTPATIENT)
Dept: GENERAL RADIOLOGY | Age: 74
DRG: 871 | End: 2024-01-26
Payer: MEDICARE

## 2024-01-26 ENCOUNTER — APPOINTMENT (OUTPATIENT)
Dept: CT IMAGING | Age: 74
DRG: 871 | End: 2024-01-26
Payer: MEDICARE

## 2024-01-26 ENCOUNTER — HOSPITAL ENCOUNTER (INPATIENT)
Age: 74
LOS: 4 days | Discharge: NURSING, REHAB OR LONG-TERM CARE | DRG: 871 | End: 2024-01-30
Attending: EMERGENCY MEDICINE | Admitting: INTERNAL MEDICINE
Payer: MEDICARE

## 2024-01-26 DIAGNOSIS — G93.41 METABOLIC ENCEPHALOPATHY: ICD-10-CM

## 2024-01-26 DIAGNOSIS — N30.01 ACUTE CYSTITIS WITH HEMATURIA: ICD-10-CM

## 2024-01-26 DIAGNOSIS — R41.82 ALTERED MENTAL STATUS, UNSPECIFIED ALTERED MENTAL STATUS TYPE: ICD-10-CM

## 2024-01-26 DIAGNOSIS — R26.2 AMBULATORY DYSFUNCTION: ICD-10-CM

## 2024-01-26 DIAGNOSIS — J18.9 MULTIFOCAL PNEUMONIA: Primary | ICD-10-CM

## 2024-01-26 LAB
ABSOLUTE BANDS: 2.24 K/UL (ref 0–1)
ALBUMIN SERPL-MCNC: 3.1 G/DL (ref 3.5–5.2)
ALP SERPL-CCNC: 119 U/L (ref 35–104)
ALT SERPL-CCNC: 23 U/L (ref 5–33)
AMMONIA PLAS-SCNC: 49 UMOL/L (ref 11–51)
ANION GAP SERPL CALCULATED.3IONS-SCNC: 17 MMOL/L (ref 9–17)
ARTERIAL PATENCY WRIST A: ABNORMAL
AST SERPL-CCNC: 13 U/L
BACTERIA URNS QL MICRO: ABNORMAL
BANDS: 14 % (ref 0–10)
BASOPHILS # BLD: 0 K/UL (ref 0–0.2)
BASOPHILS NFR BLD: 0 % (ref 0–2)
BDY SITE: ABNORMAL
BILIRUB DIRECT SERPL-MCNC: 0.3 MG/DL
BILIRUB INDIRECT SERPL-MCNC: 0.3 MG/DL (ref 0–1)
BILIRUB SERPL-MCNC: 0.6 MG/DL (ref 0.3–1.2)
BILIRUB UR QL STRIP: NEGATIVE
BODY TEMPERATURE: 37
BUN SERPL-MCNC: 88 MG/DL (ref 8–23)
CALCIUM SERPL-MCNC: 8.2 MG/DL (ref 8.6–10.4)
CASTS #/AREA URNS LPF: ABNORMAL /LPF
CASTS #/AREA URNS LPF: ABNORMAL /LPF
CHLORIDE SERPL-SCNC: 101 MMOL/L (ref 98–107)
CLARITY UR: ABNORMAL
CO2 SERPL-SCNC: 23 MMOL/L (ref 20–31)
COHGB MFR BLD: 2.3 % (ref 0–5)
COLOR UR: ABNORMAL
CREAT SERPL-MCNC: 5.6 MG/DL (ref 0.5–0.9)
EOSINOPHIL # BLD: 0 K/UL (ref 0–0.4)
EOSINOPHILS RELATIVE PERCENT: 0 % (ref 0–4)
EPI CELLS #/AREA URNS HPF: ABNORMAL /HPF
ERYTHROCYTE [DISTWIDTH] IN BLOOD BY AUTOMATED COUNT: 18.2 % (ref 11.5–14.9)
FLUAV RNA RESP QL NAA+PROBE: NOT DETECTED
FLUBV RNA RESP QL NAA+PROBE: NOT DETECTED
GFR SERPL CREATININE-BSD FRML MDRD: 7 ML/MIN/1.73M2
GLUCOSE SERPL-MCNC: 192 MG/DL (ref 70–99)
GLUCOSE UR STRIP-MCNC: NEGATIVE MG/DL
HCO3 VENOUS: 24.6 MMOL/L (ref 24–30)
HCT VFR BLD AUTO: 32.1 % (ref 36–46)
HGB BLD-MCNC: 10.3 G/DL (ref 12–16)
HGB UR QL STRIP.AUTO: ABNORMAL
INR PPP: 1.1
KETONES UR STRIP-MCNC: NEGATIVE MG/DL
LACTATE BLDV-SCNC: 2 MMOL/L (ref 0.5–1.9)
LACTATE BLDV-SCNC: 3.1 MMOL/L (ref 0.5–1.9)
LEUKOCYTE ESTERASE UR QL STRIP: ABNORMAL
LIPASE SERPL-CCNC: 10 U/L (ref 13–60)
LYMPHOCYTES NFR BLD: 0.48 K/UL (ref 1–4.8)
LYMPHOCYTES RELATIVE PERCENT: 3 % (ref 24–44)
MAGNESIUM SERPL-MCNC: 1.6 MG/DL (ref 1.6–2.6)
MCH RBC QN AUTO: 29.3 PG (ref 26–34)
MCHC RBC AUTO-ENTMCNC: 32.2 G/DL (ref 31–37)
MCV RBC AUTO: 91 FL (ref 80–100)
METHEMOGLOBIN: 0.1 % (ref 0–1.9)
MONOCYTES NFR BLD: 0.8 K/UL (ref 0.1–1.3)
MONOCYTES NFR BLD: 5 % (ref 1–7)
MORPHOLOGY: ABNORMAL
MORPHOLOGY: ABNORMAL
NEGATIVE BASE EXCESS, VEN: 0 MMOL/L (ref 0–2)
NEUTROPHILS NFR BLD: 78 % (ref 36–66)
NEUTS SEG NFR BLD: 12.48 K/UL (ref 1.3–9.1)
NITRITE UR QL STRIP: NEGATIVE
O2 SAT, VEN: 81 % (ref 60–85)
PARTIAL THROMBOPLASTIN TIME: 27.9 SEC (ref 24–36)
PCO2, VEN: 38.6 MM HG (ref 39–55)
PH UR STRIP: 6 [PH] (ref 5–8)
PH VENOUS: 7.41 (ref 7.32–7.42)
PHOSPHATE SERPL-MCNC: 5.8 MG/DL (ref 2.6–4.5)
PLATELET # BLD AUTO: 115 K/UL (ref 150–450)
PMV BLD AUTO: 9.5 FL (ref 6–12)
PO2, VEN: 48.4 MM HG (ref 30–50)
POTASSIUM SERPL-SCNC: 5.6 MMOL/L (ref 3.7–5.3)
PROT SERPL-MCNC: 5.2 G/DL (ref 6.4–8.3)
PROT UR STRIP-MCNC: ABNORMAL MG/DL
PROTHROMBIN TIME: 14.5 SEC (ref 11.8–14.6)
RBC # BLD AUTO: 3.53 M/UL (ref 4–5.2)
RBC #/AREA URNS HPF: ABNORMAL /HPF
SARS-COV-2 RNA RESP QL NAA+PROBE: NOT DETECTED
SODIUM SERPL-SCNC: 141 MMOL/L (ref 135–144)
SOURCE: NORMAL
SP GR UR STRIP: 1.01 (ref 1–1.03)
SPECIMEN DESCRIPTION: NORMAL
TROPONIN I SERPL HS-MCNC: 100 NG/L (ref 0–14)
TROPONIN I SERPL HS-MCNC: 83 NG/L (ref 0–14)
TSH SERPL DL<=0.05 MIU/L-ACNC: 1.19 UIU/ML (ref 0.3–5)
UROBILINOGEN UR STRIP-ACNC: NORMAL EU/DL (ref 0–1)
WBC #/AREA URNS HPF: ABNORMAL /HPF
WBC OTHER # BLD: 16 K/UL (ref 3.5–11)

## 2024-01-26 PROCEDURE — 87186 SC STD MICRODIL/AGAR DIL: CPT

## 2024-01-26 PROCEDURE — 83735 ASSAY OF MAGNESIUM: CPT

## 2024-01-26 PROCEDURE — 2060000000 HC ICU INTERMEDIATE R&B

## 2024-01-26 PROCEDURE — 85025 COMPLETE CBC W/AUTO DIFF WBC: CPT

## 2024-01-26 PROCEDURE — 82805 BLOOD GASES W/O2 SATURATION: CPT

## 2024-01-26 PROCEDURE — 82800 BLOOD PH: CPT

## 2024-01-26 PROCEDURE — 6370000000 HC RX 637 (ALT 250 FOR IP)

## 2024-01-26 PROCEDURE — 6360000002 HC RX W HCPCS

## 2024-01-26 PROCEDURE — 82140 ASSAY OF AMMONIA: CPT

## 2024-01-26 PROCEDURE — 96375 TX/PRO/DX INJ NEW DRUG ADDON: CPT

## 2024-01-26 PROCEDURE — 85610 PROTHROMBIN TIME: CPT

## 2024-01-26 PROCEDURE — 70450 CT HEAD/BRAIN W/O DYE: CPT

## 2024-01-26 PROCEDURE — 81001 URINALYSIS AUTO W/SCOPE: CPT

## 2024-01-26 PROCEDURE — 87086 URINE CULTURE/COLONY COUNT: CPT

## 2024-01-26 PROCEDURE — 72125 CT NECK SPINE W/O DYE: CPT

## 2024-01-26 PROCEDURE — 87040 BLOOD CULTURE FOR BACTERIA: CPT

## 2024-01-26 PROCEDURE — 80048 BASIC METABOLIC PNL TOTAL CA: CPT

## 2024-01-26 PROCEDURE — 2580000003 HC RX 258: Performed by: EMERGENCY MEDICINE

## 2024-01-26 PROCEDURE — 36415 COLL VENOUS BLD VENIPUNCTURE: CPT

## 2024-01-26 PROCEDURE — 87154 CUL TYP ID BLD PTHGN 6+ TRGT: CPT

## 2024-01-26 PROCEDURE — 83690 ASSAY OF LIPASE: CPT

## 2024-01-26 PROCEDURE — 85730 THROMBOPLASTIN TIME PARTIAL: CPT

## 2024-01-26 PROCEDURE — 6360000002 HC RX W HCPCS: Performed by: EMERGENCY MEDICINE

## 2024-01-26 PROCEDURE — 96368 THER/DIAG CONCURRENT INF: CPT

## 2024-01-26 PROCEDURE — 82947 ASSAY GLUCOSE BLOOD QUANT: CPT

## 2024-01-26 PROCEDURE — 87636 SARSCOV2 & INF A&B AMP PRB: CPT

## 2024-01-26 PROCEDURE — 6360000004 HC RX CONTRAST MEDICATION

## 2024-01-26 PROCEDURE — 99285 EMERGENCY DEPT VISIT HI MDM: CPT

## 2024-01-26 PROCEDURE — 87641 MR-STAPH DNA AMP PROBE: CPT

## 2024-01-26 PROCEDURE — 87205 SMEAR GRAM STAIN: CPT

## 2024-01-26 PROCEDURE — 71045 X-RAY EXAM CHEST 1 VIEW: CPT

## 2024-01-26 PROCEDURE — 71260 CT THORAX DX C+: CPT

## 2024-01-26 PROCEDURE — 84443 ASSAY THYROID STIM HORMONE: CPT

## 2024-01-26 PROCEDURE — 80076 HEPATIC FUNCTION PANEL: CPT

## 2024-01-26 PROCEDURE — 2580000003 HC RX 258

## 2024-01-26 PROCEDURE — 93005 ELECTROCARDIOGRAM TRACING: CPT | Performed by: INTERNAL MEDICINE

## 2024-01-26 PROCEDURE — 84484 ASSAY OF TROPONIN QUANT: CPT

## 2024-01-26 PROCEDURE — 83605 ASSAY OF LACTIC ACID: CPT

## 2024-01-26 PROCEDURE — 96365 THER/PROPH/DIAG IV INF INIT: CPT

## 2024-01-26 PROCEDURE — 84100 ASSAY OF PHOSPHORUS: CPT

## 2024-01-26 RX ORDER — RISPERIDONE 0.5 MG/1
0.5 TABLET, ORALLY DISINTEGRATING ORAL 2 TIMES DAILY
Status: DISCONTINUED | OUTPATIENT
Start: 2024-01-26 | End: 2024-01-30 | Stop reason: HOSPADM

## 2024-01-26 RX ORDER — HEPARIN SODIUM 5000 [USP'U]/ML
5000 INJECTION, SOLUTION INTRAVENOUS; SUBCUTANEOUS EVERY 8 HOURS SCHEDULED
Status: DISCONTINUED | OUTPATIENT
Start: 2024-01-26 | End: 2024-01-30 | Stop reason: HOSPADM

## 2024-01-26 RX ORDER — FENTANYL CITRATE 0.05 MG/ML
50 INJECTION, SOLUTION INTRAMUSCULAR; INTRAVENOUS ONCE
Status: COMPLETED | OUTPATIENT
Start: 2024-01-26 | End: 2024-01-26

## 2024-01-26 RX ORDER — 0.9 % SODIUM CHLORIDE 0.9 %
100 INTRAVENOUS SOLUTION INTRAVENOUS ONCE
Status: COMPLETED | OUTPATIENT
Start: 2024-01-26 | End: 2024-01-26

## 2024-01-26 RX ORDER — SODIUM CHLORIDE 0.9 % (FLUSH) 0.9 %
5-40 SYRINGE (ML) INJECTION PRN
Status: DISCONTINUED | OUTPATIENT
Start: 2024-01-26 | End: 2024-01-30 | Stop reason: HOSPADM

## 2024-01-26 RX ORDER — SODIUM CHLORIDE 0.9 % (FLUSH) 0.9 %
5-40 SYRINGE (ML) INJECTION EVERY 12 HOURS SCHEDULED
Status: DISCONTINUED | OUTPATIENT
Start: 2024-01-26 | End: 2024-01-30 | Stop reason: HOSPADM

## 2024-01-26 RX ORDER — LANOLIN ALCOHOL/MO/W.PET/CERES
3 CREAM (GRAM) TOPICAL EVERY EVENING
Status: DISCONTINUED | OUTPATIENT
Start: 2024-01-26 | End: 2024-01-30 | Stop reason: HOSPADM

## 2024-01-26 RX ORDER — LINEZOLID 2 MG/ML
600 INJECTION, SOLUTION INTRAVENOUS ONCE
Status: COMPLETED | OUTPATIENT
Start: 2024-01-26 | End: 2024-01-26

## 2024-01-26 RX ORDER — CLOPIDOGREL BISULFATE 75 MG/1
75 TABLET ORAL DAILY
Status: DISCONTINUED | OUTPATIENT
Start: 2024-01-27 | End: 2024-01-30 | Stop reason: HOSPADM

## 2024-01-26 RX ORDER — FAMOTIDINE 20 MG/1
10 TABLET, FILM COATED ORAL
Status: DISCONTINUED | OUTPATIENT
Start: 2024-01-27 | End: 2024-01-30 | Stop reason: HOSPADM

## 2024-01-26 RX ORDER — LEVOTHYROXINE SODIUM 0.03 MG/1
25 TABLET ORAL
Status: DISCONTINUED | OUTPATIENT
Start: 2024-01-27 | End: 2024-01-30 | Stop reason: HOSPADM

## 2024-01-26 RX ORDER — SODIUM CHLORIDE 9 MG/ML
INJECTION, SOLUTION INTRAVENOUS PRN
Status: DISCONTINUED | OUTPATIENT
Start: 2024-01-26 | End: 2024-01-30 | Stop reason: HOSPADM

## 2024-01-26 RX ORDER — AMIODARONE HYDROCHLORIDE 200 MG/1
100 TABLET ORAL EVERY MORNING
Status: DISCONTINUED | OUTPATIENT
Start: 2024-01-27 | End: 2024-01-30 | Stop reason: HOSPADM

## 2024-01-26 RX ORDER — BUDESONIDE AND FORMOTEROL FUMARATE DIHYDRATE 160; 4.5 UG/1; UG/1
2 AEROSOL RESPIRATORY (INHALATION)
Status: DISCONTINUED | OUTPATIENT
Start: 2024-01-26 | End: 2024-01-30 | Stop reason: HOSPADM

## 2024-01-26 RX ORDER — LIDOCAINE 4 G/G
1 PATCH TOPICAL DAILY
Status: DISCONTINUED | OUTPATIENT
Start: 2024-01-27 | End: 2024-01-30 | Stop reason: HOSPADM

## 2024-01-26 RX ORDER — 0.9 % SODIUM CHLORIDE 0.9 %
30 INTRAVENOUS SOLUTION INTRAVENOUS ONCE
Status: COMPLETED | OUTPATIENT
Start: 2024-01-26 | End: 2024-01-26

## 2024-01-26 RX ORDER — ONDANSETRON 2 MG/ML
4 INJECTION INTRAMUSCULAR; INTRAVENOUS EVERY 6 HOURS PRN
Status: DISCONTINUED | OUTPATIENT
Start: 2024-01-26 | End: 2024-01-30 | Stop reason: HOSPADM

## 2024-01-26 RX ORDER — MIDODRINE HYDROCHLORIDE 5 MG/1
5 TABLET ORAL 2 TIMES DAILY PRN
Status: DISCONTINUED | OUTPATIENT
Start: 2024-01-26 | End: 2024-01-30 | Stop reason: HOSPADM

## 2024-01-26 RX ORDER — ATORVASTATIN CALCIUM 40 MG/1
40 TABLET, FILM COATED ORAL EVERY EVENING
Status: DISCONTINUED | OUTPATIENT
Start: 2024-01-26 | End: 2024-01-30 | Stop reason: HOSPADM

## 2024-01-26 RX ORDER — UMECLIDINIUM 62.5 UG/1
1 AEROSOL, POWDER ORAL DAILY
Status: ON HOLD | COMMUNITY

## 2024-01-26 RX ORDER — DEXAMETHASONE SODIUM PHOSPHATE 4 MG/ML
2 INJECTION, SOLUTION INTRA-ARTICULAR; INTRALESIONAL; INTRAMUSCULAR; INTRAVENOUS; SOFT TISSUE 2 TIMES DAILY
Status: COMPLETED | OUTPATIENT
Start: 2024-01-27 | End: 2024-01-27

## 2024-01-26 RX ORDER — SODIUM CHLORIDE 0.9 % (FLUSH) 0.9 %
10 SYRINGE (ML) INJECTION PRN
Status: DISCONTINUED | OUTPATIENT
Start: 2024-01-26 | End: 2024-01-30 | Stop reason: HOSPADM

## 2024-01-26 RX ORDER — ACETAMINOPHEN 325 MG/1
650 TABLET ORAL EVERY 6 HOURS PRN
Status: DISCONTINUED | OUTPATIENT
Start: 2024-01-26 | End: 2024-01-30 | Stop reason: HOSPADM

## 2024-01-26 RX ORDER — FUROSEMIDE 40 MG/1
80 TABLET ORAL
Status: DISCONTINUED | OUTPATIENT
Start: 2024-01-26 | End: 2024-01-30 | Stop reason: HOSPADM

## 2024-01-26 RX ORDER — POLYETHYLENE GLYCOL 3350 17 G/17G
17 POWDER, FOR SOLUTION ORAL DAILY
Status: DISCONTINUED | OUTPATIENT
Start: 2024-01-27 | End: 2024-01-30 | Stop reason: HOSPADM

## 2024-01-26 RX ORDER — HYDROXYZINE HYDROCHLORIDE 25 MG/1
50 TABLET, FILM COATED ORAL DAILY PRN
Status: DISCONTINUED | OUTPATIENT
Start: 2024-01-26 | End: 2024-01-30 | Stop reason: HOSPADM

## 2024-01-26 RX ORDER — FUROSEMIDE 80 MG
80 TABLET ORAL
Status: ON HOLD | COMMUNITY

## 2024-01-26 RX ORDER — BISACODYL 5 MG/1
5 TABLET, DELAYED RELEASE ORAL DAILY PRN
Status: DISCONTINUED | OUTPATIENT
Start: 2024-01-26 | End: 2024-01-30 | Stop reason: HOSPADM

## 2024-01-26 RX ORDER — LACOSAMIDE 100 MG/1
100 TABLET ORAL 2 TIMES DAILY
Status: DISCONTINUED | OUTPATIENT
Start: 2024-01-26 | End: 2024-01-30 | Stop reason: HOSPADM

## 2024-01-26 RX ORDER — ONDANSETRON 4 MG/1
4 TABLET, ORALLY DISINTEGRATING ORAL EVERY 8 HOURS PRN
Status: DISCONTINUED | OUTPATIENT
Start: 2024-01-26 | End: 2024-01-30 | Stop reason: HOSPADM

## 2024-01-26 RX ORDER — VITAMIN B COMPLEX
1 CAPSULE ORAL DAILY
Status: ON HOLD | COMMUNITY

## 2024-01-26 RX ORDER — LIDOCAINE 40 MG/G
CREAM TOPICAL PRN
Status: DISCONTINUED | OUTPATIENT
Start: 2024-01-26 | End: 2024-01-30 | Stop reason: HOSPADM

## 2024-01-26 RX ORDER — FAMOTIDINE 20 MG/1
20 TABLET, FILM COATED ORAL
Status: DISCONTINUED | OUTPATIENT
Start: 2024-01-27 | End: 2024-01-26 | Stop reason: DRUGHIGH

## 2024-01-26 RX ORDER — DESLORATADINE 5 MG/1
5 TABLET ORAL DAILY
Status: DISCONTINUED | OUTPATIENT
Start: 2024-01-27 | End: 2024-01-26 | Stop reason: CLARIF

## 2024-01-26 RX ORDER — ACETAMINOPHEN 650 MG/1
650 SUPPOSITORY RECTAL EVERY 6 HOURS PRN
Status: DISCONTINUED | OUTPATIENT
Start: 2024-01-26 | End: 2024-01-30 | Stop reason: HOSPADM

## 2024-01-26 RX ORDER — ALBUTEROL SULFATE 2.5 MG/3ML
2.5 SOLUTION RESPIRATORY (INHALATION) EVERY 6 HOURS PRN
Status: DISCONTINUED | OUTPATIENT
Start: 2024-01-26 | End: 2024-01-30 | Stop reason: HOSPADM

## 2024-01-26 RX ORDER — ASPIRIN 81 MG/1
81 TABLET ORAL DAILY
Status: DISCONTINUED | OUTPATIENT
Start: 2024-01-27 | End: 2024-01-30 | Stop reason: HOSPADM

## 2024-01-26 RX ORDER — CETIRIZINE HYDROCHLORIDE 10 MG/1
5 TABLET ORAL DAILY
Status: DISCONTINUED | OUTPATIENT
Start: 2024-01-27 | End: 2024-01-30 | Stop reason: HOSPADM

## 2024-01-26 RX ADMIN — SODIUM CHLORIDE 100 ML: 9 INJECTION, SOLUTION INTRAVENOUS at 17:12

## 2024-01-26 RX ADMIN — DEXTROSE MONOHYDRATE 250 ML: 100 INJECTION, SOLUTION INTRAVENOUS at 19:57

## 2024-01-26 RX ADMIN — IOPAMIDOL 75 ML: 755 INJECTION, SOLUTION INTRAVENOUS at 17:11

## 2024-01-26 RX ADMIN — INSULIN HUMAN 10 UNITS: 100 INJECTION, SOLUTION PARENTERAL at 19:58

## 2024-01-26 RX ADMIN — SODIUM CHLORIDE 1800 ML: 9 INJECTION, SOLUTION INTRAVENOUS at 16:47

## 2024-01-26 RX ADMIN — LINEZOLID 600 MG: 600 INJECTION, SOLUTION INTRAVENOUS at 16:50

## 2024-01-26 RX ADMIN — SODIUM CHLORIDE, PRESERVATIVE FREE 10 ML: 5 INJECTION INTRAVENOUS at 17:11

## 2024-01-26 RX ADMIN — FENTANYL CITRATE 50 MCG: 0.05 INJECTION, SOLUTION INTRAMUSCULAR; INTRAVENOUS at 18:37

## 2024-01-26 RX ADMIN — PIPERACILLIN AND TAZOBACTAM 4500 MG: 4; .5 INJECTION, POWDER, FOR SOLUTION INTRAVENOUS at 17:21

## 2024-01-26 ASSESSMENT — LIFESTYLE VARIABLES: HOW OFTEN DO YOU HAVE A DRINK CONTAINING ALCOHOL: NEVER

## 2024-01-26 ASSESSMENT — HEART SCORE: ECG: 0

## 2024-01-26 ASSESSMENT — PAIN SCALES - WONG BAKER: WONGBAKER_NUMERICALRESPONSE: 0

## 2024-01-26 NOTE — ED NOTES
Pts linen, brief, and coccyx dressing changed. Pts dressing from the facility was dated 1/23. Pt has some small green stool in her brief, no urine. Per pts daughter she makes very little urine daily d/t dialysis and her limited oral intake.

## 2024-01-26 NOTE — ED PROVIDER NOTES
[facility] ED  eMERGENCY dEPARTMENT eNCOUnter   Attending Attestation     Pt Name: Lupe Ojeda  MRN: [id number]  Birthdate 1950  Date of evaluation: 1/26/24       Lupe Ojeda is a 73 y.o. female who presents with Altered Mental Status      History:   73-year-old female presenting with an altered mental state.    Exam: Vitals:   Vitals:    01/26/24 1815 01/26/24 1830 01/26/24 1845 01/26/24 1900   BP: (!) 151/62 (!) 161/105 (!) 147/72 (!) 136/54   Pulse: 97 (!) 102 (!) 104 97   Resp: 24 24 16 17   Temp:       TempSrc:       SpO2: 97% 98% 98% 94%   Weight:       Height:         Is this patient to be included in the SEP-1 core measure? Yes SEP-1 CORE MEASURE DATA      Sepsis Criteria   Severe Sepsis Criteria   Septic Shock Criteria       Must meet 2:    []Temp >100.9 F (38.3 C) or < 96.8 F (36 C)  []HR > 90  []RR > 20  []WBC > 12 or < 4 or 10% bands    AND:    [] Infection Confirmed or Suspected.     Must meet 1:    []Lactate > 2       or   []Signs of Organ Dysfunction:    - SBP < 90 or MAP < 65  -Creatinine > 2 or increased from baseline  -Urine Output < 0.5 ml/kg/hr  -Bilirubin > 2  -INR > 1.5 (not anticoagulated)  -Platelets < 100,000  -Acute Respiratory Failure as evidenced by new need for NIPPV or mechanical ventilation   Must meet 1:    []Lactate > 4        or   []SBP < 90 or MAP < 65 for at least two readings in the first hour after fluid bolus administration    []Vasopressors initiated (if hypotension persists after fluid resuscitation)   Patient Vitals for the past 6 hrs:   BP Temp Pulse Resp SpO2 Height Weight Weight Method Percent Weight Change   01/26/24 1625 (!) 139/117 98.8 °F (37.1 °C) (!) 118 (!) 32 (!) 87 % 1.524 m (5') 59.9 kg (132 lb) Estimated 0   01/26/24 1630 132/77 -- (!) 115 30 -- -- -- -- --      No results for input(s): \"WBC\", \"LACTA\", \"LACTACIDWB\", \"CREATININE\", \"BILITOT\", \"INR\", \"PLT\" in the last 72 hours.     Severe sepsis identified date: 01/26/24 time: 1630    Fluid 
topical 2-3 times a day as needed for pain, maximum 20 g/day 7/26/23   Kayleigh Cardoso MD   albuterol sulfate HFA (PROAIR HFA) 108 (90 Base) MCG/ACT inhaler Inhale 2 puffs into the lungs every 6 hours as needed for Wheezing or Shortness of Breath (cough) 7/19/23   Kayleigh Cardoso MD   levothyroxine (SYNTHROID) 25 MCG tablet Take 1 tablet by mouth every morning (before breakfast) without no other meds for 30 minutes, take when you first wake up and you are still bed 7/13/23   Kayleigh Cardoso MD   midodrine (PROAMATINE) 5 MG tablet Take 1 tablet by mouth 2 times daily as needed (if low BP below 115/65, take to dialysis) 7/11/23   Kayleigh Cardoso MD   acetaminophen (TYLENOL) 500 MG tablet Take 1 tablet by mouth every 6 hours as needed for Pain or Fever    ProviderMino MD   Elastic Bandages & Supports (KNEE BRACE/FLEX STAYS MEDIUM) MISC as directed 2/15/23   ProviderMino MD   amLODIPine (NORVASC) 10 MG tablet Take 1 tablet by mouth nightly 6/11/22 7/18/22  Migdalia Chatman MD   Blood Pressure KIT Diagnosis: HTN. Needs to check blood pressure 1-2 times a day until stable, then once a day. Goal blood pressure less than 135/85, and above 110/60. 5/26/22   Kayleigh Cardoso MD   Nebulizers (COMPRESSOR/NEBULIZER) MISC Nebulizer with compressor. Disposable Med Nebs 2 /month. Reusable Med Nebs 1 per 6 months.Aerosol Mask 1 per month. Replacement Filters 2 per month. All other related supplies as needed per month. Medications being used:Albuterol .083 unit dose vial. Frequency: every 6 hrs x 4/day .Length of Need: 1 2/22/22   Karo Morales MD   [facility] Does not apply route Expires on 12/30/25 2/22/22   Karo Morales MD         REVIEW OF SYSTEMS       Review of Systems   Unable to perform ROS: Mental status change       PHYSICAL EXAM      INITIAL VITALS:   BP (!) 122/50   Pulse (!) 119   Temp 98.5 °F (36.9 °C) (Axillary)   Resp 18   Ht 1.524 m (5')   Wt 64.2 kg

## 2024-01-27 PROBLEM — J18.9 MULTIFOCAL PNEUMONIA: Status: ACTIVE | Noted: 2024-01-27

## 2024-01-27 LAB
ABSOLUTE BANDS: 3.64 K/UL (ref 0–1)
AMMONIA PLAS-SCNC: 34 UMOL/L (ref 11–51)
ANION GAP SERPL CALCULATED.3IONS-SCNC: 16 MMOL/L (ref 9–17)
ARTERIAL PATENCY WRIST A: ABNORMAL
BANDS: 28 % (ref 0–10)
BASOPHILS # BLD: 0 K/UL (ref 0–0.2)
BASOPHILS NFR BLD: 0 % (ref 0–2)
BDY SITE: ABNORMAL
BUN SERPL-MCNC: 87 MG/DL (ref 8–23)
CALCIUM SERPL-MCNC: 7.9 MG/DL (ref 8.6–10.4)
CHLORIDE SERPL-SCNC: 103 MMOL/L (ref 98–107)
CO2 SERPL-SCNC: 21 MMOL/L (ref 20–31)
COHGB MFR BLD: 1.9 % (ref 0–5)
CREAT SERPL-MCNC: 5.2 MG/DL (ref 0.5–0.9)
CRP SERPL HS-MCNC: 209.9 MG/L (ref 0–5)
EOSINOPHIL # BLD: 0 K/UL (ref 0–0.4)
EOSINOPHILS RELATIVE PERCENT: 0 % (ref 0–4)
ERYTHROCYTE [DISTWIDTH] IN BLOOD BY AUTOMATED COUNT: 18.4 % (ref 11.5–14.9)
GFR SERPL CREATININE-BSD FRML MDRD: 8 ML/MIN/1.73M2
GLUCOSE BLD-MCNC: 114 MG/DL (ref 65–105)
GLUCOSE BLD-MCNC: 123 MG/DL (ref 65–105)
GLUCOSE BLD-MCNC: 133 MG/DL (ref 65–105)
GLUCOSE BLD-MCNC: 176 MG/DL (ref 65–105)
GLUCOSE BLD-MCNC: 77 MG/DL (ref 65–105)
GLUCOSE SERPL-MCNC: 133 MG/DL (ref 70–99)
HCO3 ARTERIAL: 21.8 MMOL/L (ref 22–26)
HCT VFR BLD AUTO: 27.7 % (ref 36–46)
HGB BLD-MCNC: 8.8 G/DL (ref 12–16)
LACTATE BLDV-SCNC: 3.2 MMOL/L (ref 0.5–2.2)
LYMPHOCYTES NFR BLD: 0.26 K/UL (ref 1–4.8)
LYMPHOCYTES RELATIVE PERCENT: 2 % (ref 24–44)
MCH RBC QN AUTO: 29.5 PG (ref 26–34)
MCHC RBC AUTO-ENTMCNC: 31.7 G/DL (ref 31–37)
MCV RBC AUTO: 93.1 FL (ref 80–100)
METHEMOGLOBIN: 1.2 % (ref 0–1.9)
MONOCYTES NFR BLD: 0.78 K/UL (ref 0.1–1.3)
MONOCYTES NFR BLD: 6 % (ref 1–7)
MORPHOLOGY: ABNORMAL
MRSA, DNA, NASAL: NEGATIVE
NEGATIVE BASE EXCESS, ART: 3 MMOL/L (ref 0–2)
NEUTROPHILS NFR BLD: 64 % (ref 36–66)
NEUTS SEG NFR BLD: 8.32 K/UL (ref 1.3–9.1)
O2 SAT, ARTERIAL: 92 % (ref 95–98)
PCO2 ARTERIAL: 35.1 MMHG (ref 35–45)
PH ARTERIAL: 7.4 (ref 7.35–7.45)
PLATELET # BLD AUTO: 78 K/UL (ref 150–450)
PMV BLD AUTO: 9.5 FL (ref 6–12)
PO2 ARTERIAL: 73.6 MMHG (ref 80–100)
POTASSIUM SERPL-SCNC: 5.3 MMOL/L (ref 3.7–5.3)
POTASSIUM SERPL-SCNC: 5.4 MMOL/L (ref 3.7–5.3)
PROCALCITONIN SERPL-MCNC: 1.25 NG/ML
PROLACTIN SERPL-MCNC: 159 NG/ML (ref 4.79–23.3)
PT. POSITION: ABNORMAL
RBC # BLD AUTO: 2.98 M/UL (ref 4–5.2)
SODIUM SERPL-SCNC: 140 MMOL/L (ref 135–144)
SPECIMEN DESCRIPTION: NORMAL
TEXT FOR RESPIRATORY: ABNORMAL
WBC OTHER # BLD: 13 K/UL (ref 3.5–11)

## 2024-01-27 PROCEDURE — 93005 ELECTROCARDIOGRAM TRACING: CPT | Performed by: EMERGENCY MEDICINE

## 2024-01-27 PROCEDURE — 99291 CRITICAL CARE FIRST HOUR: CPT | Performed by: INTERNAL MEDICINE

## 2024-01-27 PROCEDURE — 6370000000 HC RX 637 (ALT 250 FOR IP)

## 2024-01-27 PROCEDURE — 6360000002 HC RX W HCPCS: Performed by: NURSE PRACTITIONER

## 2024-01-27 PROCEDURE — 6360000002 HC RX W HCPCS

## 2024-01-27 PROCEDURE — 2500000003 HC RX 250 WO HCPCS: Performed by: INTERNAL MEDICINE

## 2024-01-27 PROCEDURE — C9254 INJECTION, LACOSAMIDE: HCPCS

## 2024-01-27 PROCEDURE — 2580000003 HC RX 258

## 2024-01-27 PROCEDURE — 85025 COMPLETE CBC W/AUTO DIFF WBC: CPT

## 2024-01-27 PROCEDURE — 99222 1ST HOSP IP/OBS MODERATE 55: CPT | Performed by: PSYCHIATRY & NEUROLOGY

## 2024-01-27 PROCEDURE — 86738 MYCOPLASMA ANTIBODY: CPT

## 2024-01-27 PROCEDURE — 82140 ASSAY OF AMMONIA: CPT

## 2024-01-27 PROCEDURE — 2580000003 HC RX 258: Performed by: EMERGENCY MEDICINE

## 2024-01-27 PROCEDURE — 2700000000 HC OXYGEN THERAPY PER DAY

## 2024-01-27 PROCEDURE — 90935 HEMODIALYSIS ONE EVALUATION: CPT

## 2024-01-27 PROCEDURE — 99222 1ST HOSP IP/OBS MODERATE 55: CPT | Performed by: NURSE PRACTITIONER

## 2024-01-27 PROCEDURE — 36415 COLL VENOUS BLD VENIPUNCTURE: CPT

## 2024-01-27 PROCEDURE — 5A1D70Z PERFORMANCE OF URINARY FILTRATION, INTERMITTENT, LESS THAN 6 HOURS PER DAY: ICD-10-PCS | Performed by: RADIOLOGY

## 2024-01-27 PROCEDURE — 84145 PROCALCITONIN (PCT): CPT

## 2024-01-27 PROCEDURE — 2500000003 HC RX 250 WO HCPCS

## 2024-01-27 PROCEDURE — 84132 ASSAY OF SERUM POTASSIUM: CPT

## 2024-01-27 PROCEDURE — 83605 ASSAY OF LACTIC ACID: CPT

## 2024-01-27 PROCEDURE — 94640 AIRWAY INHALATION TREATMENT: CPT

## 2024-01-27 PROCEDURE — 80048 BASIC METABOLIC PNL TOTAL CA: CPT

## 2024-01-27 PROCEDURE — 2000000000 HC ICU R&B

## 2024-01-27 PROCEDURE — 82805 BLOOD GASES W/O2 SATURATION: CPT

## 2024-01-27 PROCEDURE — 6360000002 HC RX W HCPCS: Performed by: PSYCHIATRY & NEUROLOGY

## 2024-01-27 PROCEDURE — 86140 C-REACTIVE PROTEIN: CPT

## 2024-01-27 PROCEDURE — 2580000003 HC RX 258: Performed by: NURSE PRACTITIONER

## 2024-01-27 PROCEDURE — 84146 ASSAY OF PROLACTIN: CPT

## 2024-01-27 RX ORDER — 0.9 % SODIUM CHLORIDE 0.9 %
250 INTRAVENOUS SOLUTION INTRAVENOUS ONCE
Status: COMPLETED | OUTPATIENT
Start: 2024-01-27 | End: 2024-01-27

## 2024-01-27 RX ORDER — METOPROLOL SUCCINATE 25 MG/1
25 TABLET, EXTENDED RELEASE ORAL EVERY EVENING
Status: DISCONTINUED | OUTPATIENT
Start: 2024-01-27 | End: 2024-01-30 | Stop reason: HOSPADM

## 2024-01-27 RX ORDER — LINEZOLID 2 MG/ML
600 INJECTION, SOLUTION INTRAVENOUS EVERY 12 HOURS
Status: DISCONTINUED | OUTPATIENT
Start: 2024-01-27 | End: 2024-01-27

## 2024-01-27 RX ORDER — DEXAMETHASONE SODIUM PHOSPHATE 4 MG/ML
2 INJECTION, SOLUTION INTRA-ARTICULAR; INTRALESIONAL; INTRAMUSCULAR; INTRAVENOUS; SOFT TISSUE EVERY 12 HOURS
Status: DISCONTINUED | OUTPATIENT
Start: 2024-01-27 | End: 2024-01-27 | Stop reason: SDUPTHER

## 2024-01-27 RX ORDER — DEXAMETHASONE SODIUM PHOSPHATE 4 MG/ML
2 INJECTION, SOLUTION INTRA-ARTICULAR; INTRALESIONAL; INTRAMUSCULAR; INTRAVENOUS; SOFT TISSUE EVERY 12 HOURS
Status: DISCONTINUED | OUTPATIENT
Start: 2024-01-27 | End: 2024-01-30 | Stop reason: HOSPADM

## 2024-01-27 RX ORDER — METOPROLOL TARTRATE 1 MG/ML
5 INJECTION, SOLUTION INTRAVENOUS EVERY 6 HOURS PRN
Status: DISCONTINUED | OUTPATIENT
Start: 2024-01-27 | End: 2024-01-30 | Stop reason: HOSPADM

## 2024-01-27 RX ADMIN — PIPERACILLIN AND TAZOBACTAM 3375 MG: 3; .375 INJECTION, POWDER, FOR SOLUTION INTRAVENOUS at 18:02

## 2024-01-27 RX ADMIN — ANTI-FUNGAL POWDER MICONAZOLE NITRATE TALC FREE: 1.42 POWDER TOPICAL at 11:40

## 2024-01-27 RX ADMIN — DEXAMETHASONE SODIUM PHOSPHATE 2 MG: 4 INJECTION INTRA-ARTICULAR; INTRALESIONAL; INTRAMUSCULAR; INTRAVENOUS; SOFT TISSUE at 03:32

## 2024-01-27 RX ADMIN — AMIODARONE HYDROCHLORIDE 1 MG/MIN: 1.8 INJECTION, SOLUTION INTRAVENOUS at 21:06

## 2024-01-27 RX ADMIN — ANTI-FUNGAL POWDER MICONAZOLE NITRATE TALC FREE: 1.42 POWDER TOPICAL at 01:58

## 2024-01-27 RX ADMIN — ANTI-FUNGAL POWDER MICONAZOLE NITRATE TALC FREE: 1.42 POWDER TOPICAL at 22:12

## 2024-01-27 RX ADMIN — Medication 2 ML: at 17:07

## 2024-01-27 RX ADMIN — BUDESONIDE AND FORMOTEROL FUMARATE DIHYDRATE 2 PUFF: 160; 4.5 AEROSOL RESPIRATORY (INHALATION) at 07:17

## 2024-01-27 RX ADMIN — SODIUM CHLORIDE 250 ML: 9 INJECTION, SOLUTION INTRAVENOUS at 03:44

## 2024-01-27 RX ADMIN — Medication 10 ML: at 11:39

## 2024-01-27 RX ADMIN — LACOSAMIDE 100 MG: 10 INJECTION INTRAVENOUS at 01:57

## 2024-01-27 RX ADMIN — Medication 2 ML: at 17:08

## 2024-01-27 RX ADMIN — DEXAMETHASONE SODIUM PHOSPHATE 2 MG: 4 INJECTION INTRA-ARTICULAR; INTRALESIONAL; INTRAMUSCULAR; INTRAVENOUS; SOFT TISSUE at 22:12

## 2024-01-27 RX ADMIN — AMIODARONE HYDROCHLORIDE 0.25 MG/MIN: 1.8 INJECTION, SOLUTION INTRAVENOUS at 22:29

## 2024-01-27 RX ADMIN — METOPROLOL TARTRATE 5 MG: 5 INJECTION, SOLUTION INTRAVENOUS at 14:29

## 2024-01-27 RX ADMIN — LACOSAMIDE 100 MG: 10 INJECTION INTRAVENOUS at 11:15

## 2024-01-27 RX ADMIN — AMIODARONE HYDROCHLORIDE 1 MG/MIN: 1.8 INJECTION, SOLUTION INTRAVENOUS at 15:40

## 2024-01-27 RX ADMIN — PIPERACILLIN AND TAZOBACTAM 3375 MG: 3; .375 INJECTION, POWDER, FOR SOLUTION INTRAVENOUS at 06:19

## 2024-01-27 RX ADMIN — SODIUM CHLORIDE, PRESERVATIVE FREE 10 ML: 5 INJECTION INTRAVENOUS at 01:51

## 2024-01-27 RX ADMIN — AMIODARONE HYDROCHLORIDE 0.5 MG/MIN: 1.8 INJECTION, SOLUTION INTRAVENOUS at 21:46

## 2024-01-27 RX ADMIN — LACOSAMIDE 100 MG: 10 INJECTION INTRAVENOUS at 22:12

## 2024-01-27 RX ADMIN — SODIUM CHLORIDE, PRESERVATIVE FREE 10 ML: 5 INJECTION INTRAVENOUS at 11:39

## 2024-01-27 RX ADMIN — TIOTROPIUM BROMIDE INHALATION SPRAY 2 PUFF: 3.12 SPRAY, METERED RESPIRATORY (INHALATION) at 07:17

## 2024-01-27 RX ADMIN — SODIUM CHLORIDE, PRESERVATIVE FREE 10 ML: 5 INJECTION INTRAVENOUS at 01:50

## 2024-01-27 ASSESSMENT — PAIN SCALES - GENERAL: PAINLEVEL_OUTOF10: 6

## 2024-01-27 ASSESSMENT — PAIN DESCRIPTION - PAIN TYPE: TYPE: OTHER (COMMENT)

## 2024-01-27 NOTE — H&P
with patient's daughter who was present at the bedside told her that if patient gets intubated she might not be able to come off the vent  She voiced understanding she told me that she will call her siblings  Also discussed CODE STATUS, told her that if her heart ever stops and she would need CPR and mechanical ventilation her chances of meaningful recovery would be slim  She wound likely be end up with trach and PEG    Acute hypoxic respiratory failure secondary to multifocal pneumonia, on Zyvox and Zosyn, will consult ID, pulm consulted as well, monitor cultures, patient is likely aspirating, will keep n.p.o. and once she is more awake will do the sleep study      Severe sepsis secondary to multifocal pneumonia and UTI    Seizure disorder, recently had multiple admissions and was evaluated by neurology thoroughly, on Vimpat, will check prolactin, neurology consulted    Meningioma with vasogenic edema, on Decadron, neurology consulted as per chart review patient not a candidate for surgical procedure due to recent cath    Chronic thrombocytopenia, likely secondary to uremia, further had some bone marrow suppression due to recent infection, hold onto heparin, DVT prophylaxis with S SC    Continue to monitor    End-stage renal disease on hemodialysis, patient has sign of volume overload and is been uremic, will benefit from dialysis, will reach out to nephrology    Anemia of chronic disease    CAD s/p  PCI recently in October, on aspirin    UTI already on broad-spectrum antibiotics    Her overall prognosis is very poor with high risk of clinical decompensation, family notified, will consult palliative for goals of care discussion    Will transfer to ICU for closer monitoring  Consult pulm critical care    Cc time 35 mis   Consultations:   IP CONSULT TO NEPHROLOGY  IP CONSULT TO INTERNAL MEDICINE  IP CONSULT TO NEUROLOGY  IP CONSULT TO NEPHROLOGY  IP CONSULT TO PALLIATIVE CARE     Patient is admitted as inpatient

## 2024-01-27 NOTE — ED NOTES
Report given to , RN from PCU.   Report method by phone   The following was reviewed with receiving RN:   Current vital signs:  BP (!) 148/59   Pulse 88   Temp 98.6 °F (37 °C) (Oral)   Resp 23   Ht 1.524 m (5')   Wt 59.9 kg (132 lb)   SpO2 94%   BMI 25.78 kg/m²                MEWS Score: 4     Any medication or safety alerts were reviewed. Any pending diagnostics and notifications were also reviewed, as well as any safety concerns or issues, abnormal labs, abnormal imaging, and abnormal assessment findings. Questions were answered.

## 2024-01-27 NOTE — FLOWSHEET NOTE
01/27/24 0357   Treatment Team Notification   Reason for Communication Evaluate   Name of Team Member Notified Dr Novak   Treatment Team Role Consulting Provider   Method of Communication Call   Response No new orders   Notification Time 0357     MD notified of reason of admission, potassium levels, and last reported dialysis treatment.  MD states that patient can receive dialysis today.  No new orders received at this time.

## 2024-01-27 NOTE — PLAN OF CARE
Problem: Safety - Adult  Goal: Free from fall injury  Outcome: Progressing     Problem: Discharge Planning  Goal: Discharge to home or other facility with appropriate resources  Outcome: Progressing  Flowsheets (Taken 1/27/2024 1102)  Discharge to home or other facility with appropriate resources: Identify barriers to discharge with patient and caregiver     Problem: ABCDS Injury Assessment  Goal: Absence of physical injury  Outcome: Progressing     Problem: Skin/Tissue Integrity  Goal: Absence of new skin breakdown  Description: 1.  Monitor for areas of redness and/or skin breakdown  2.  Assess vascular access sites hourly  3.  Every 4-6 hours minimum:  Change oxygen saturation probe site  4.  Every 4-6 hours:  If on nasal continuous positive airway pressure, respiratory therapy assess nares and determine need for appliance change or resting period.  Outcome: Progressing     Problem: Pain  Goal: Verbalizes/displays adequate comfort level or baseline comfort level  Outcome: Progressing     Problem: Chronic Conditions and Co-morbidities  Goal: Patient's chronic conditions and co-morbidity symptoms are monitored and maintained or improved  Outcome: Progressing  Flowsheets (Taken 1/27/2024 1102)  Care Plan - Patient's Chronic Conditions and Co-Morbidity Symptoms are Monitored and Maintained or Improved: Monitor and assess patient's chronic conditions and comorbid symptoms for stability, deterioration, or improvement

## 2024-01-27 NOTE — ACP (ADVANCE CARE PLANNING)
Advance Care Planning     Advance Care Planning Activator (Inpatient)  Conversation Note      Date of ACP Conversation: 1/27/2024     Conversation Conducted with: Patient with Decision Making Capacity    ACP Activator: Nieves Adorno RN        Health Care Decision Maker:     Current Designated Health Care Decision Maker:     Primary Decision Maker: Sea Nelson - Child - [phone]    Primary Decision Maker: CYRIL NELSON - Child - [phone]    Primary Decision Maker: AZALEA NELSON - Child - [phone]    Primary Decision Maker: Kimberly Lema - Child - [phone]  Click here to complete Healthcare Decision Makers including section of the Healthcare Decision Maker Relationship (ie \"Primary\")  Today we documented Decision Maker(s) consistent with Legal Next of Kin hierarchy.    Care Preferences    Ventilation:  \"If you were in your present state of health and suddenly became very ill and were unable to breathe on your own, what would your preference be about the use of a ventilator (breathing machine) if it were available to you?\"      Would the patient desire the use of ventilator (breathing machine)?: yes    \"If your health worsens and it becomes clear that your chance of recovery is unlikely, what would your preference be about the use of a ventilator (breathing machine) if it were available to you?\"     Would the patient desire the use of ventilator (breathing machine)?: Yes      Resuscitation  \"CPR works best to restart the heart when there is a sudden event, like a heart attack, in someone who is otherwise healthy. Unfortunately, CPR does not typically restart the heart for people who have serious health conditions or who are very sick.\"    \"In the event your heart stopped as a result of an underlying serious health condition, would you want attempts to be made to restart your heart (answer \"yes\" for attempt to resuscitate) or would you prefer a natural death (answer \"no\" for do not attempt to resuscitate)?\"

## 2024-01-27 NOTE — CARE COORDINATION
Case Management Assessment  Initial Evaluation    Date/Time of Evaluation: 1/27/2024 10:07 AM  Assessment Completed by: Nieves Adorno RN    If patient is discharged prior to next notation, then this note serves as note for discharge by case management.    Patient Name: Lupe Nelson                   YOB: 1950  Diagnosis: Metabolic encephalopathy [G93.41]  Altered mental status [R41.82]  Acute cystitis with hematuria [N30.01]  Altered mental status, unspecified altered mental status type [R41.82]  Multifocal pneumonia [J18.9]                   Date / Time: 1/26/2024  4:22 PM    Patient Admission Status: Inpatient   Readmission Risk (Low < 19, Mod (19-27), High > 27): Readmission Risk Score: 29.9    Current PCP: Kayleigh Cardoso MD  PCP verified by CM? Yes    Chart Reviewed: Yes      History Provided by: Medical Record  Patient Orientation: Unable to Assess    Patient Cognition:      Hospitalization in the last 30 days (Readmission):  Yes    If yes, Readmission Assessment in  Navigator will be completed.    Advance Directives:      Code Status: Full Code   Patient's Primary Decision Maker is: Patient Declined (Legal Next of Kin Remains as Decision Maker)    Primary Decision Maker: RustySea - Child - [phone]    Primary Decision Maker: CYRIL NELSON - Child - [phone]    Primary Decision Maker: AZALEA NELSON - Child - [phone]    Primary Decision Maker: Kimberly Lema - Child - [phone]    Discharge Planning:    Patient lives with: Other (Comment) ([facility]) Type of Home: Skilled Nursing Facility  Primary Care Giver: Other (Comment) ([facility])  Patient Support Systems include: Children   Current Financial resources: [place],  ([place])  Current community resources: Other (Comment) ([facility])  Current services prior to admission: Skilled Nursing Facility            Current DME: Oxygen Therapy (Comment) (PRN oxygen use nightly)            Type of Home Care

## 2024-01-28 LAB
ABSOLUTE BANDS: 0.96 K/UL (ref 0–1)
ANION GAP SERPL CALCULATED.3IONS-SCNC: 14 MMOL/L (ref 9–17)
BANDS: 10 % (ref 0–10)
BASOPHILS # BLD: 0 K/UL (ref 0–0.2)
BASOPHILS NFR BLD: 0 % (ref 0–2)
BUN SERPL-MCNC: 37 MG/DL (ref 8–23)
CALCIUM SERPL-MCNC: 7.7 MG/DL (ref 8.6–10.4)
CHLORIDE SERPL-SCNC: 98 MMOL/L (ref 98–107)
CO2 SERPL-SCNC: 23 MMOL/L (ref 20–31)
CREAT SERPL-MCNC: 2.9 MG/DL (ref 0.5–0.9)
EOSINOPHIL # BLD: 0 K/UL (ref 0–0.4)
EOSINOPHILS RELATIVE PERCENT: 0 % (ref 0–4)
ERYTHROCYTE [DISTWIDTH] IN BLOOD BY AUTOMATED COUNT: 18.2 % (ref 11.5–14.9)
GFR SERPL CREATININE-BSD FRML MDRD: 16 ML/MIN/1.73M2
GLUCOSE BLD-MCNC: 106 MG/DL (ref 65–105)
GLUCOSE BLD-MCNC: 112 MG/DL (ref 65–105)
GLUCOSE BLD-MCNC: 113 MG/DL (ref 65–105)
GLUCOSE BLD-MCNC: 87 MG/DL (ref 65–105)
GLUCOSE BLD-MCNC: 89 MG/DL (ref 65–105)
GLUCOSE SERPL-MCNC: 102 MG/DL (ref 70–99)
HCT VFR BLD AUTO: 26.4 % (ref 36–46)
HGB BLD-MCNC: 8.3 G/DL (ref 12–16)
LYMPHOCYTES NFR BLD: 0.29 K/UL (ref 1–4.8)
LYMPHOCYTES RELATIVE PERCENT: 3 % (ref 24–44)
MCH RBC QN AUTO: 29.2 PG (ref 26–34)
MCHC RBC AUTO-ENTMCNC: 31.3 G/DL (ref 31–37)
MCV RBC AUTO: 93.3 FL (ref 80–100)
MICROORGANISM SPEC CULT: ABNORMAL
MONOCYTES NFR BLD: 0.19 K/UL (ref 0.1–1.3)
MONOCYTES NFR BLD: 2 % (ref 1–7)
MORPHOLOGY: ABNORMAL
MORPHOLOGY: ABNORMAL
NEUTROPHILS NFR BLD: 85 % (ref 36–66)
NEUTS SEG NFR BLD: 8.16 K/UL (ref 1.3–9.1)
PLATELET # BLD AUTO: 80 K/UL (ref 150–450)
PMV BLD AUTO: 9.7 FL (ref 6–12)
POTASSIUM SERPL-SCNC: 4.3 MMOL/L (ref 3.7–5.3)
RBC # BLD AUTO: 2.82 M/UL (ref 4–5.2)
SODIUM SERPL-SCNC: 135 MMOL/L (ref 135–144)
SPECIMEN DESCRIPTION: ABNORMAL
WBC OTHER # BLD: 9.6 K/UL (ref 3.5–11)

## 2024-01-28 PROCEDURE — 2580000003 HC RX 258: Performed by: INTERNAL MEDICINE

## 2024-01-28 PROCEDURE — 99233 SBSQ HOSP IP/OBS HIGH 50: CPT | Performed by: INTERNAL MEDICINE

## 2024-01-28 PROCEDURE — 99232 SBSQ HOSP IP/OBS MODERATE 35: CPT | Performed by: PSYCHIATRY & NEUROLOGY

## 2024-01-28 PROCEDURE — 6360000002 HC RX W HCPCS: Performed by: INTERNAL MEDICINE

## 2024-01-28 PROCEDURE — P9047 ALBUMIN (HUMAN), 25%, 50ML: HCPCS | Performed by: INTERNAL MEDICINE

## 2024-01-28 PROCEDURE — 2000000000 HC ICU R&B

## 2024-01-28 PROCEDURE — 6360000002 HC RX W HCPCS

## 2024-01-28 PROCEDURE — 80048 BASIC METABOLIC PNL TOTAL CA: CPT

## 2024-01-28 PROCEDURE — C9254 INJECTION, LACOSAMIDE: HCPCS

## 2024-01-28 PROCEDURE — 92610 EVALUATE SWALLOWING FUNCTION: CPT

## 2024-01-28 PROCEDURE — 94761 N-INVAS EAR/PLS OXIMETRY MLT: CPT

## 2024-01-28 PROCEDURE — 36415 COLL VENOUS BLD VENIPUNCTURE: CPT

## 2024-01-28 PROCEDURE — 6360000002 HC RX W HCPCS: Performed by: PSYCHIATRY & NEUROLOGY

## 2024-01-28 PROCEDURE — 2580000003 HC RX 258: Performed by: NURSE PRACTITIONER

## 2024-01-28 PROCEDURE — 82947 ASSAY GLUCOSE BLOOD QUANT: CPT

## 2024-01-28 PROCEDURE — 6360000002 HC RX W HCPCS: Performed by: NURSE PRACTITIONER

## 2024-01-28 PROCEDURE — 2580000003 HC RX 258

## 2024-01-28 PROCEDURE — 99232 SBSQ HOSP IP/OBS MODERATE 35: CPT | Performed by: NURSE PRACTITIONER

## 2024-01-28 PROCEDURE — 85025 COMPLETE CBC W/AUTO DIFF WBC: CPT

## 2024-01-28 PROCEDURE — 6370000000 HC RX 637 (ALT 250 FOR IP)

## 2024-01-28 PROCEDURE — 2500000003 HC RX 250 WO HCPCS: Performed by: INTERNAL MEDICINE

## 2024-01-28 RX ORDER — HYDRALAZINE HYDROCHLORIDE 20 MG/ML
15 INJECTION INTRAMUSCULAR; INTRAVENOUS EVERY 6 HOURS PRN
Status: DISCONTINUED | OUTPATIENT
Start: 2024-01-28 | End: 2024-01-30 | Stop reason: HOSPADM

## 2024-01-28 RX ORDER — ALBUMIN (HUMAN) 12.5 G/50ML
25 SOLUTION INTRAVENOUS ONCE
Status: COMPLETED | OUTPATIENT
Start: 2024-01-28 | End: 2024-01-28

## 2024-01-28 RX ORDER — DEXTROSE AND SODIUM CHLORIDE 5; .9 G/100ML; G/100ML
INJECTION, SOLUTION INTRAVENOUS CONTINUOUS
Status: DISCONTINUED | OUTPATIENT
Start: 2024-01-28 | End: 2024-01-30 | Stop reason: HOSPADM

## 2024-01-28 RX ADMIN — MEROPENEM 1000 MG: 1 INJECTION, POWDER, FOR SOLUTION INTRAVENOUS at 23:00

## 2024-01-28 RX ADMIN — DEXAMETHASONE SODIUM PHOSPHATE 2 MG: 4 INJECTION INTRA-ARTICULAR; INTRALESIONAL; INTRAMUSCULAR; INTRAVENOUS; SOFT TISSUE at 20:12

## 2024-01-28 RX ADMIN — DEXTROSE AND SODIUM CHLORIDE: 5; 900 INJECTION, SOLUTION INTRAVENOUS at 14:03

## 2024-01-28 RX ADMIN — ANTI-FUNGAL POWDER MICONAZOLE NITRATE TALC FREE: 1.42 POWDER TOPICAL at 08:21

## 2024-01-28 RX ADMIN — PIPERACILLIN AND TAZOBACTAM 3375 MG: 3; .375 INJECTION, POWDER, FOR SOLUTION INTRAVENOUS at 05:08

## 2024-01-28 RX ADMIN — DEXAMETHASONE SODIUM PHOSPHATE 2 MG: 4 INJECTION INTRA-ARTICULAR; INTRALESIONAL; INTRAMUSCULAR; INTRAVENOUS; SOFT TISSUE at 08:21

## 2024-01-28 RX ADMIN — ALBUMIN (HUMAN) 25 G: 0.25 INJECTION, SOLUTION INTRAVENOUS at 02:21

## 2024-01-28 RX ADMIN — AMIODARONE HYDROCHLORIDE 0.5 MG/MIN: 1.8 INJECTION, SOLUTION INTRAVENOUS at 16:34

## 2024-01-28 RX ADMIN — LACOSAMIDE 100 MG: 10 INJECTION INTRAVENOUS at 09:36

## 2024-01-28 RX ADMIN — PIPERACILLIN AND TAZOBACTAM 3375 MG: 3; .375 INJECTION, POWDER, FOR SOLUTION INTRAVENOUS at 18:03

## 2024-01-28 RX ADMIN — ANTI-FUNGAL POWDER MICONAZOLE NITRATE TALC FREE: 1.42 POWDER TOPICAL at 23:32

## 2024-01-28 RX ADMIN — SODIUM CHLORIDE, PRESERVATIVE FREE 10 ML: 5 INJECTION INTRAVENOUS at 08:21

## 2024-01-28 ASSESSMENT — PAIN DESCRIPTION - LOCATION: LOCATION: COCCYX

## 2024-01-28 ASSESSMENT — PAIN SCALES - GENERAL: PAINLEVEL_OUTOF10: 10

## 2024-01-28 NOTE — PLAN OF CARE
BC  kleb - procal slightly trending up  Switched to meropenem pend sensi of the kleb    Dr Santizo to see tomorrow    Suzie Yoon MD. Infectious Diseases

## 2024-01-29 ENCOUNTER — APPOINTMENT (OUTPATIENT)
Dept: GENERAL RADIOLOGY | Age: 74
DRG: 871 | End: 2024-01-29
Attending: INTERNAL MEDICINE
Payer: MEDICARE

## 2024-01-29 ENCOUNTER — APPOINTMENT (OUTPATIENT)
Dept: GENERAL RADIOLOGY | Age: 74
DRG: 871 | End: 2024-01-29
Payer: MEDICARE

## 2024-01-29 ENCOUNTER — APPOINTMENT (OUTPATIENT)
Dept: INTERVENTIONAL RADIOLOGY/VASCULAR | Age: 74
DRG: 871 | End: 2024-01-29
Payer: MEDICARE

## 2024-01-29 ENCOUNTER — APPOINTMENT (OUTPATIENT)
Dept: NEUROLOGY | Age: 74
DRG: 871 | End: 2024-01-29
Payer: MEDICARE

## 2024-01-29 PROBLEM — Z99.2 ESRD ON HEMODIALYSIS (HCC): Status: ACTIVE | Noted: 2024-01-18

## 2024-01-29 LAB
ALBUMIN SERPL-MCNC: 2.7 G/DL (ref 3.5–5.2)
ALP SERPL-CCNC: 98 U/L (ref 35–104)
ALT SERPL-CCNC: 15 U/L (ref 5–33)
ANION GAP SERPL CALCULATED.3IONS-SCNC: 20 MMOL/L (ref 9–17)
AST SERPL-CCNC: 11 U/L
BASOPHILS # BLD: 0 K/UL (ref 0–0.2)
BASOPHILS NFR BLD: 0 % (ref 0–2)
BILIRUB SERPL-MCNC: 0.6 MG/DL (ref 0.3–1.2)
BUN SERPL-MCNC: 53 MG/DL (ref 8–23)
CALCIUM SERPL-MCNC: 8.9 MG/DL (ref 8.6–10.4)
CHLORIDE SERPL-SCNC: 94 MMOL/L (ref 98–107)
CO2 SERPL-SCNC: 21 MMOL/L (ref 20–31)
CREAT SERPL-MCNC: 3.7 MG/DL (ref 0.5–0.9)
CRP SERPL HS-MCNC: 126.9 MG/L (ref 0–5)
EKG Q-T INTERVAL: 326 MS
EKG QRS DURATION: 86 MS
EKG QTC CALCULATION (BAZETT): 439 MS
EKG T AXIS: 173 DEGREES
EKG VENTRICULAR RATE: 109 BPM
EOSINOPHIL # BLD: 0 K/UL (ref 0–0.4)
EOSINOPHILS RELATIVE PERCENT: 0 % (ref 0–4)
ERYTHROCYTE [DISTWIDTH] IN BLOOD BY AUTOMATED COUNT: 18.5 % (ref 11.5–14.9)
GFR SERPL CREATININE-BSD FRML MDRD: 12 ML/MIN/1.73M2
GLUCOSE BLD-MCNC: 113 MG/DL (ref 65–105)
GLUCOSE BLD-MCNC: 138 MG/DL (ref 65–105)
GLUCOSE BLD-MCNC: 149 MG/DL (ref 65–105)
GLUCOSE SERPL-MCNC: 150 MG/DL (ref 70–99)
HCT VFR BLD AUTO: 28.2 % (ref 36–46)
HGB BLD-MCNC: 9.2 G/DL (ref 12–16)
LYMPHOCYTES NFR BLD: 0.18 K/UL (ref 1–4.8)
LYMPHOCYTES RELATIVE PERCENT: 2 % (ref 24–44)
M PNEUMO IGM SER QL IA: 0.19
MCH RBC QN AUTO: 30 PG (ref 26–34)
MCHC RBC AUTO-ENTMCNC: 32.7 G/DL (ref 31–37)
MCV RBC AUTO: 91.7 FL (ref 80–100)
MICROORGANISM SPEC CULT: ABNORMAL
MONOCYTES NFR BLD: 0.26 K/UL (ref 0.1–1.3)
MONOCYTES NFR BLD: 3 % (ref 1–7)
MORPHOLOGY: ABNORMAL
NEUTROPHILS NFR BLD: 95 % (ref 36–66)
NEUTS SEG NFR BLD: 8.36 K/UL (ref 1.3–9.1)
PLATELET # BLD AUTO: 92 K/UL (ref 150–450)
PMV BLD AUTO: 9 FL (ref 6–12)
POTASSIUM SERPL-SCNC: 4.4 MMOL/L (ref 3.7–5.3)
PROCALCITONIN SERPL-MCNC: 1.26 NG/ML
PROT SERPL-MCNC: 5.3 G/DL (ref 6.4–8.3)
RBC # BLD AUTO: 3.08 M/UL (ref 4–5.2)
SERVICE CMNT-IMP: ABNORMAL
SODIUM SERPL-SCNC: 135 MMOL/L (ref 135–144)
SPECIMEN DESCRIPTION: ABNORMAL
WBC OTHER # BLD: 8.8 K/UL (ref 3.5–11)

## 2024-01-29 PROCEDURE — 2580000003 HC RX 258: Performed by: INTERNAL MEDICINE

## 2024-01-29 PROCEDURE — 6360000002 HC RX W HCPCS

## 2024-01-29 PROCEDURE — 6370000000 HC RX 637 (ALT 250 FOR IP)

## 2024-01-29 PROCEDURE — 6370000000 HC RX 637 (ALT 250 FOR IP): Performed by: INTERNAL MEDICINE

## 2024-01-29 PROCEDURE — 85025 COMPLETE CBC W/AUTO DIFF WBC: CPT

## 2024-01-29 PROCEDURE — 6360000002 HC RX W HCPCS: Performed by: INTERNAL MEDICINE

## 2024-01-29 PROCEDURE — 86140 C-REACTIVE PROTEIN: CPT

## 2024-01-29 PROCEDURE — 2580000003 HC RX 258

## 2024-01-29 PROCEDURE — 2709999900 IR FLUORO GUIDED CVA DEVICE PLMT/REPLACE/REMOVAL

## 2024-01-29 PROCEDURE — 99213 OFFICE O/P EST LOW 20 MIN: CPT

## 2024-01-29 PROCEDURE — 36415 COLL VENOUS BLD VENIPUNCTURE: CPT

## 2024-01-29 PROCEDURE — 6360000002 HC RX W HCPCS: Performed by: PSYCHIATRY & NEUROLOGY

## 2024-01-29 PROCEDURE — 82947 ASSAY GLUCOSE BLOOD QUANT: CPT

## 2024-01-29 PROCEDURE — C9254 INJECTION, LACOSAMIDE: HCPCS

## 2024-01-29 PROCEDURE — 74018 RADEX ABDOMEN 1 VIEW: CPT

## 2024-01-29 PROCEDURE — 2500000003 HC RX 250 WO HCPCS: Performed by: INTERNAL MEDICINE

## 2024-01-29 PROCEDURE — 36556 INSERT NON-TUNNEL CV CATH: CPT

## 2024-01-29 PROCEDURE — 99233 SBSQ HOSP IP/OBS HIGH 50: CPT | Performed by: INTERNAL MEDICINE

## 2024-01-29 PROCEDURE — 92611 MOTION FLUOROSCOPY/SWALLOW: CPT

## 2024-01-29 PROCEDURE — 80053 COMPREHEN METABOLIC PANEL: CPT

## 2024-01-29 PROCEDURE — 74230 X-RAY XM SWLNG FUNCJ C+: CPT

## 2024-01-29 PROCEDURE — 84145 PROCALCITONIN (PCT): CPT

## 2024-01-29 PROCEDURE — 02HV33Z INSERTION OF INFUSION DEVICE INTO SUPERIOR VENA CAVA, PERCUTANEOUS APPROACH: ICD-10-PCS | Performed by: RADIOLOGY

## 2024-01-29 PROCEDURE — 95819 EEG AWAKE AND ASLEEP: CPT

## 2024-01-29 PROCEDURE — 2580000003 HC RX 258: Performed by: EMERGENCY MEDICINE

## 2024-01-29 PROCEDURE — 95819 EEG AWAKE AND ASLEEP: CPT | Performed by: PSYCHIATRY & NEUROLOGY

## 2024-01-29 PROCEDURE — 76937 US GUIDE VASCULAR ACCESS: CPT

## 2024-01-29 PROCEDURE — 99222 1ST HOSP IP/OBS MODERATE 55: CPT | Performed by: NURSE PRACTITIONER

## 2024-01-29 PROCEDURE — 2000000000 HC ICU R&B

## 2024-01-29 RX ORDER — FLUCONAZOLE 2 MG/ML
200 INJECTION, SOLUTION INTRAVENOUS EVERY 24 HOURS
Status: DISCONTINUED | OUTPATIENT
Start: 2024-01-29 | End: 2024-01-30 | Stop reason: HOSPADM

## 2024-01-29 RX ORDER — ASPIRIN 300 MG/1
300 SUPPOSITORY RECTAL DAILY
Status: DISCONTINUED | OUTPATIENT
Start: 2024-01-29 | End: 2024-01-30

## 2024-01-29 RX ADMIN — Medication 10 ML: at 21:31

## 2024-01-29 RX ADMIN — LACOSAMIDE 100 MG: 10 INJECTION INTRAVENOUS at 09:37

## 2024-01-29 RX ADMIN — FLUCONAZOLE 200 MG: 200 INJECTION, SOLUTION INTRAVENOUS at 12:37

## 2024-01-29 RX ADMIN — DEXAMETHASONE SODIUM PHOSPHATE 2 MG: 4 INJECTION INTRA-ARTICULAR; INTRALESIONAL; INTRAMUSCULAR; INTRAVENOUS; SOFT TISSUE at 21:27

## 2024-01-29 RX ADMIN — RISPERIDONE 0.5 MG: 0.5 TABLET, ORALLY DISINTEGRATING ORAL at 21:28

## 2024-01-29 RX ADMIN — RISPERIDONE 0.5 MG: 0.5 TABLET, ORALLY DISINTEGRATING ORAL at 09:47

## 2024-01-29 RX ADMIN — SODIUM CHLORIDE 5 ML/HR: 9 INJECTION, SOLUTION INTRAVENOUS at 16:00

## 2024-01-29 RX ADMIN — DEXTROSE AND SODIUM CHLORIDE: 5; 900 INJECTION, SOLUTION INTRAVENOUS at 16:13

## 2024-01-29 RX ADMIN — SODIUM CHLORIDE, PRESERVATIVE FREE 10 ML: 5 INJECTION INTRAVENOUS at 21:30

## 2024-01-29 RX ADMIN — ANTI-FUNGAL POWDER MICONAZOLE NITRATE TALC FREE: 1.42 POWDER TOPICAL at 21:31

## 2024-01-29 RX ADMIN — LACOSAMIDE 100 MG: 10 INJECTION INTRAVENOUS at 00:12

## 2024-01-29 RX ADMIN — AMIODARONE HYDROCHLORIDE 0.5 MG/MIN: 1.8 INJECTION, SOLUTION INTRAVENOUS at 12:38

## 2024-01-29 RX ADMIN — AMIODARONE HYDROCHLORIDE 0.5 MG/MIN: 1.8 INJECTION, SOLUTION INTRAVENOUS at 16:13

## 2024-01-29 RX ADMIN — DEXAMETHASONE SODIUM PHOSPHATE 2 MG: 4 INJECTION INTRA-ARTICULAR; INTRALESIONAL; INTRAMUSCULAR; INTRAVENOUS; SOFT TISSUE at 09:44

## 2024-01-29 RX ADMIN — ASPIRIN 300 MG: 300 SUPPOSITORY RECTAL at 09:45

## 2024-01-29 RX ADMIN — MEROPENEM 500 MG: 500 INJECTION, POWDER, FOR SOLUTION INTRAVENOUS at 21:39

## 2024-01-29 RX ADMIN — LACOSAMIDE 100 MG: 10 INJECTION INTRAVENOUS at 21:00

## 2024-01-29 RX ADMIN — ANTI-FUNGAL POWDER MICONAZOLE NITRATE TALC FREE: 1.42 POWDER TOPICAL at 09:47

## 2024-01-29 ASSESSMENT — PAIN SCALES - PAIN ASSESSMENT IN ADVANCED DEMENTIA (PAINAD)
BREATHING: 0
BODYLANGUAGE: 0
BODYLANGUAGE: 0
CONSOLABILITY: 0
TOTALSCORE: 2
TOTALSCORE: 0
BODYLANGUAGE: 0
BREATHING: 0
CONSOLABILITY: 0
NEGVOCALIZATION: 0
CONSOLABILITY: 0
TOTALSCORE: 2
BODYLANGUAGE: 0
CONSOLABILITY: 0
NEGVOCALIZATION: 0
NEGVOCALIZATION: 0
BODYLANGUAGE: 0
CONSOLABILITY: 0
TOTALSCORE: 2
NEGVOCALIZATION: 0
TOTALSCORE: 0
NEGVOCALIZATION: 2
BREATHING: 0
CONSOLABILITY: 0
TOTALSCORE: 2
FACIALEXPRESSION: 0
FACIALEXPRESSION: 0
NEGVOCALIZATION: 2
BODYLANGUAGE: 0
BODYLANGUAGE: 0
FACIALEXPRESSION: 0
NEGVOCALIZATION: 2
TOTALSCORE: 0
BREATHING: 0
TOTALSCORE: 0
CONSOLABILITY: 0
BREATHING: 0
FACIALEXPRESSION: 0
BREATHING: 0
BODYLANGUAGE: 0
FACIALEXPRESSION: 0
FACIALEXPRESSION: 0
BODYLANGUAGE: 0
CONSOLABILITY: 0
NEGVOCALIZATION: 0
FACIALEXPRESSION: 0
BREATHING: 0
NEGVOCALIZATION: 0
FACIALEXPRESSION: 0
NEGVOCALIZATION: 2
FACIALEXPRESSION: 0
FACIALEXPRESSION: 0
CONSOLABILITY: 0
BODYLANGUAGE: 0
TOTALSCORE: 0
CONSOLABILITY: 0
TOTALSCORE: 0
BREATHING: 0

## 2024-01-29 NOTE — CARE COORDINATION
Patient does NOT meet criteria for LTAC at this time.    Electronically signed by Vicky Gonzalez RN on 1/29/2024 at 2:57 PM

## 2024-01-29 NOTE — PLAN OF CARE
Problem: Safety - Adult  Goal: Free from fall injury  Outcome: Progressing     Problem: Discharge Planning  Goal: Discharge to home or other facility with appropriate resources  Outcome: Progressing  Flowsheets (Taken 1/29/2024 0715)  Discharge to home or other facility with appropriate resources:   Identify barriers to discharge with patient and caregiver   Refer to discharge planning if patient needs post-hospital services based on physician order or complex needs related to functional status, cognitive ability or social support system     Problem: ABCDS Injury Assessment  Goal: Absence of physical injury  Outcome: Progressing  Flowsheets (Taken 1/29/2024 0715)  Absence of Physical Injury: Implement safety measures based on patient assessment     Problem: Skin/Tissue Integrity  Goal: Absence of new skin breakdown  Description: 1.  Monitor for areas of redness and/or skin breakdown  2.  Assess vascular access sites hourly  3.  Every 4-6 hours minimum:  Change oxygen saturation probe site  4.  Every 4-6 hours:  If on nasal continuous positive airway pressure, respiratory therapy assess nares and determine need for appliance change or resting period.  Outcome: Progressing     Problem: Pain  Goal: Verbalizes/displays adequate comfort level or baseline comfort level  Outcome: Progressing

## 2024-01-29 NOTE — PROCEDURES
EEG REPORT       Patient: Lupe Ojeda Age: 73 y.o.  MRN: [id number]      Referring Provider: No ref. provider found    History: This routine 30 minute scalp EEG was recorded with video- monitoring for a 73 y.o.. female who presented with encephalopathy. This EEG was performed to evaluate for focal and epileptiform abnormalities.     Lupe Ojeda   Current Facility-Administered Medications   Medication Dose Route Frequency Provider Last Rate Last Admin    aspirin suppository 300 mg  300 mg Rectal Daily Claudia Stauffer MD   300 mg at 01/29/24 0945    fluconazole (DIFLUCAN) in 0.9 % sodium chloride IVPB 200 mg  200 mg IntraVENous Q24H Claudia Stauffer  mL/hr at 01/29/24 1237 200 mg at 01/29/24 1237    dextrose 5 % and 0.9 % sodium chloride infusion   IntraVENous Continuous Fan Novak MD   Stopped at 01/28/24 2219    meropenem (MERREM) 500 mg in sodium chloride 0.9 % 100 mL extended IVPB (mini-bag)  500 mg IntraVENous Q24H Suzie Yoon MD        hydrALAZINE (APRESOLINE) injection 15 mg  15 mg IntraVENous Q6H PRN Cornel Morton MD        [Held by provider] metoprolol succinate (TOPROL XL) extended release tablet 25 mg  25 mg Oral QPM Kate Collier APRN - NP        metoprolol (LOPRESSOR) injection 5 mg  5 mg IntraVENous Q6H PRN Kate Collier APRN - NP   5 mg at 01/27/24 1429    anticoagulant sodium citrate 4 % injection 2 mL  2 mL IntraCATHeter PRN Fan Novak MD   2 mL at 01/27/24 1708    anticoagulant sodium citrate 4 % injection 2 mL  2 mL IntraCATHeter PRN Fan Novak MD   2 mL at 01/27/24 1707    dexAMETHasone (DECADRON) injection 2 mg  2 mg IntraVENous Q12H Karen Crawford DO   2 mg at 01/29/24 0944    amiodarone (NEXTERONE) 360 mg in dextrose 5% 200 ml  0.5 mg/min IntraVENous Continuous Cornel Morton MD 16.7 mL/hr at 01/29/24 1238 0.5 mg/min at 01/29/24 1238    sodium chloride flush 0.9 % injection 5-40 mL  5-40 mL IntraVENous 2 times per day 
insufficiency, and Unspecified vitamin D deficiency.  Past Surgical History:  has a past surgical history that includes Tonsillectomy and adenoidectomy; Coronary artery bypass graft; intubation (03/07/2022); Thoracentesis (03/10/2022); Upper gastrointestinal endoscopy (N/A, 03/15/2022); Colonoscopy (N/A, 03/15/2022); IR NONTUNNELED VASCULAR CATHETER > 5 YEARS (06/28/2022); IR TUNNELED CVC PLACE WO SQ PORT/PUMP > 5 YEARS (07/08/2022); Upper gastrointestinal endoscopy (N/A, 07/21/2022); Cardiac catheterization (2020); AV fistula creation (Right); Cardioversion; Cardiac catheterization (11/27/2023); Cardiac procedure (N/A, 11/27/2023); Cardiac procedure (N/A, 11/27/2023); and Cardiac procedure (N/A, 11/27/2023).               Type of Study: Initial MBS       Recent CXR/CT of Chest: Date 01/26- CXR- 1. Patchy infiltrates predominantly mid and lower lungs bilateral an  associated pleural effusion more readily visualized on CT chest performed  less than 1 hour prior to this exam concerning for pneumonia.  2. Technically very limited study, steep LPO projection obscuring the naima  correlate with chest CT report.  3. Right IJ hemodialysis catheter tip at superior cavoatrial junction level.  This is confirmed on recent chest CT.  4. Cardiomegaly.  5. Sequela from CABG.    Patient Complaints/Reason for Referral:  Lupe Ojeda was referred for a MBS to assess the efficiency of his/her swallow function, assess for aspiration, and to make recommendations regarding safe dietary consistencies, effective compensatory strategies, and safe eating environment.       Onset of problem:      Unable to r/o or confirm aspiration at bedside, prompting recommendations for this test.           Behavior/Cognition/Vision/Hearing:  Behavior/Cognition: Confused;Requires cueing;Doesn't follow directions  Vision: Within Functional Limits  Hearing: Exceptions to WFL  Hearing Exceptions: Hard of hearing/hearing concerns    Impressions:

## 2024-01-29 NOTE — BRIEF OP NOTE
Brief Postoperative Note      Patient: Lupe Ojeda  YOB: 1950  MRN: [id number]    Date of Procedure: 1/29/2024    Pre-Op Diagnosis: poor venous access     Post-Op Diagnosis: same    Procedure: CVC insertion    Performing: Gideon Vasquez MD    Anesthesia: local    Estimated Blood Loss (mL): Minimal    Complications: None    Specimens:   none    Findings: successful L CFV CVC insertion      Electronically signed by GIDEON VASQUEZ MD on 1/29/2024 at 3:38 PM

## 2024-01-29 NOTE — CARE COORDINATION
ONGOING DISCHARGE PLAN:    Patient is alert to self only.    Spoke with patient's daughter at bedside regarding discharge plan and confirms that plan is still SNF. Sea is looking into [facility] as an option. She stated [facility] would hold patient's bed for 30 days, but wants to have all options available.   Agreeable for referral to be sent to [facility] for review.     IV merrem per ID-+BC for klebsiella  UA-candida IV diflucan    Cardiology consult-amiodarone drip    Barium Swallow pending  SLP    IV decadron per neuro    Need PT/OT orders      Will continue to follow for additional discharge needs.    If patient is discharged prior to next notation, then this note serves as note for discharge by case management.    Electronically signed by Debbie Mcallister RN on 1/29/2024 at 11:24 AM

## 2024-01-29 NOTE — CONSULTS
[facility] -   Infectious diseases evaluation  admission date 1/26/2024    reason for consultation:   MDRO Multifocal Pneumonia    Impression :   Current:  Multilobar pneumonia.  UTI  Sepsis secomndary to above.                        Leukocytosis  Altered Mental Status  Stable Meningioma.  Stage 3 sacral decubitus.    Other:    Discussion / summary of stay / plan of care     Recommendations   Stop Zyvox.  MRSA nasal probe negative.  Zosyn 3.375 gm IV every 12 hours for now.  Follow CBC, blood, urine cx.  Adjust antibiotics.  CRP, Procalcitonin  Supportive care.  Discussed with patient, RN.    Infection Control Recommendations   [place] Precautions  Contact Isolation     Antimicrobial Stewardship Recommendations   Simplification of therapy  Targeted therapy    History of Present Illness:   Initial history:  Lupe Ojeda is a 73 y.o.-year-old female a history of myocardial infarction, CVA, hypertension, meningioma, CHF, type 2 diabetes, ESRD stage IV on dialysis acute on chronic respiratory failure and paroxysmal A-fib who presents with Altered Mental Status who presented to the ED from [facility] after being somnolent x 2 days.  SNF nursing facility did not awaken her to take her to dialysis because they \"did not want to disturb her\".  The patient was recently discharged from [facility]  on 1/24/24 for admission of altered mental status with concerns for underlying dementia, underlying seizure disorder, and stable meningioma.        In ER patient's, troponins were elevated but EKG was negative for acute cardiac ischemia.  There was concern for sepsis; chest x-ray was concerning for pneumonia and urinalysis was concerning for a UTI.  The patient's mental state did improve; she was complaining of abdominal pain and was given fentanyl which rendered her somnolent and unable to answer questions at this time.  Neurology was consulted due to the patient's previous admission at 
  NEPHROLOGY CONSULT     Patient :  Lupe Oejda; 73 y.o. MRN# [id number]  Location:  2010/2010-01  Attending:  Claudia Stauffer MD  Admit Date:  1/26/2024   Hospital Day: 1      Reason for Consult: ESRD/hemodialysis      Chief Complaint: Decreasing responsiveness  History Obtained From:  family member -daughter    History of Present Illness:    This is a 73 y.o. female with a significant past medical history of Cerebrovascular accident, atrial fibrillation status post CABG and recent PCI to left circumflex November 2023,  combined diastolic and systolic heart failure, essential hypertension, partial deafness and  ESRD on hemodialysis Tuesday Thursday Saturday at [facility] dialysis unit under my care.  Patient had a recent hospitalization at [facility] in [facility] for altered mental status patient was worked up for meningioma MRI brain showed right-sided anterior middle cranial fossa meningioma stable vasogenic edema and mild mass effect in the anterior right temporal lobe.  Patient was seen by neurology and neurosurgery patient was discharged to [facility] on Decadron and Vimpat.  Patient was at [facility] and did not have dialysis on Thursday because decreasing responsiveness, family noted that patient was unresponsive and therefore EMS was called and patient was transferred to ER for further evaluation and management.  Patient is lethargic, although minimal responsive, patient opens her eyes but looks withdrawn and does not engage in conversation.    Nephrology is consulted for maintenance of dialysis    Past Medical History:        Diagnosis Date    Acute CVA (cerebrovascular accident) ([facility]) 07/25/2020    Acute kidney injury superimposed on CKD ([facility]) 06/08/2022    Acute on chronic combined systolic (congestive) and diastolic (congestive) heart failure ([facility]) 02/06/2022    Acute respiratory failure with hypoxia and hypercapnia ([facility])     JOY (acute kidney injury) ([facility]) 01/15/2022    Arthritis     
  [facility]   CONSULT NOTE:      DATE OF CONSULT 1/27/2024    REASON FOR CONSULTATION:  Encephalopathy.  History of meningiomas.  History of renal disease      PCP Kayleigh Cardoso MD     CHIEF COMPLAINT: (Altered mental status)    HISTORY OF PRESENT ILLNESS:     Patient is a 73-year-old female.  She did present with an altered mental state.  She does have a history of right anterior medial cranial fossa meningioma with some stable basal genic edema and mild mass effect.  The patient was eventually transferred to [facility] from [facility].  There was concern of underlying seizure disorder due to abnormal EEG.  The patient was of was treated and eventually transferred to [facility] January 23, 2024 from [facility].  The patient was discharged    On Decadron 2 mg every 8 hours for 1 week then 2 mg twice daily per neurosurgery recommendations.  The patient is supposed to be on Vimpat 100 mg twice daily and risperidone 1 mg at night 0.5 mg in the morning    Patient presents to [facility] yesterday because of all      CT of the brain revealed no evidence of intra or extra-axial hemorrhage.  There was evidence of mild edema in the region of the right temporal lobe with some obscuration of the cortical Nidia.  Reportedly this was seen on the images back in January 20.  There is evidence of multilobar pneumonia on CT scan of the chest    Patient currently is on Decadron 2 mg every 12 hours.  Patient is on Vimpat 100 mg twice daily.  IV.  Patient was started on Zosyn and Zyvox this morning.  ID consult is pending.    I was contacted to see the patient because of concerns with being unresponsive with no gag reflex.  We made efforts to pick move the patient to ICU as nephrology wanted the patient ICU prior to consideration of dialysis.    The patient did have a bed available.  I went over to see the patient in progressive room 2099.  Bedside nurse 
.  Palliative Care Inpatient Consult    NAME:  Lupe Ojeda  MEDICAL RECORD NUMBER:  [id number]  AGE: 73 y.o.   GENDER: female  : 1950  TODAY'S DATE:  2024    Reasons for Consultation:    Symptom and/or pain management  Provision of information regarding PC and/or hospice philosophies  Complex, time-intensive communication and interdisciplinary psychosocial support  Clarification of goals of care and/or assistance with difficult decision-making  Guidance in regards to resources and transition(s)    Members of PC team contributing to this consultation are : Barbara Pearl, Palliative Care APRN-CNP    Plan      Palliative Interaction: I went to see patient and she was lying in bed sleeping with daughter Sea at bedside. I reminded Sea of our role and she remembered speaking to us in the past.     I discussed patients chronic history and current hospitalized problems. She is very aware of patients condition and family always seems to be present. She reports need to f/u with NS but does not know name/number to do so. She was going to ask nursing to write info down for her while here.     Patient has been in/out of hospital and rehab. She reports not being happy with care at NH. Patient has developed a bed sore and UTI from stated poor cleanliness. She reports working with DC planner to see about options but has realized that patient is no where near her baseline and care would be difficult at home.     I discussed patients Mx chronic problems and QOL. I discussed how multiple physicians have stated prognosis being poor. We discussed how sometimes suffering can be extended.     I confirmed that there is 4 bio children with majority being Next of Kin. She reports all trying to come together to make decisions as they don't want to have a split decision. She reports sister Kimberly in [place] is a PA not practicing and brother Charles seem to be on same page but reports sister Svitlana having a difficult time with 
hives.    PHYSICAL EXAM:    Physical Examination:    BP (!) 143/73   Pulse 79   Temp 97.6 °F (36.4 °C) (Axillary)   Resp 18   Ht 1.524 m (5')   Wt 64.2 kg (141 lb 8.6 oz)   SpO2 100%   BMI 27.64 kg/m²    Constitutional and General Appearance: alert, cooperative, no distress and appears stated age  HEENT: PERRL, no cervical lymphadenopathy. No masses palpable. Normal oral mucosa  Respiratory:  Normal excursion and expansion without use of accessory muscles  Resp Auscultation: Good respiratory effort. No for increased work of breathing. On auscultation: clear to auscultation bilaterally  Cardiovascular:  The apical impulse is not displaced  Heart tones are crisp and normal. regular S1 and S2. Murmurs:  None  Jugular venous pulsation Normal  The carotid upstroke is normal in amplitude and contour without delay or bruit  Peripheral pulses are symmetrical and full   Abdomen:  No masses or tenderness  Bowel sounds present  Extremities:   No Cyanosis or Clubbing   Lower extremity edema: Trace   Skin: Warm and dry  Neurological:  Alert and oriented.  Moves all extremities well  No abnormalities of mood, affect, memory, mentation, or behavior are noted    DATA:    Diagnostics:      EKG: AF, 116 bpm; non-specific ST and T wave abnormality      CARDIAC CATH 11/27/2023      Conclusion    Multivessel coronary artery disease    Patent LIMA-LAD and SVG-RPDA grafts. Known occluded SVG-OM.    Successful PTCA/SONNY of mid left circumflex    Normal LV systolic function with estimated EF 55-60%. Normal LVEDP    High grade stenosis in the proximal right Common iliac artery noted on CFA angiogram    2D ECHO ( 10/26/23)    Left Ventricle: Mildly reduced left ventricular systolic function with a visually estimated EF of 40 - 45%. Left ventricle size is normal. Normal wall thickness. Mild global hypokinesis present.    Aortic Valve: Trileaflet valve. Mild regurgitation with a centrally directed jet. Mild stenosis of the aortic valve. 
Image   01/29/24 1249   Wound Etiology Deep tissue/Injury 01/29/24 1249   Dressing Status Dry;New dressing applied 01/29/24 1249   Dressing/Treatment Foam 01/29/24 1249   Wound Length (cm) 1 cm 01/29/24 1249   Wound Width (cm) 1 cm 01/29/24 1249   Wound Depth (cm) 0 cm 01/29/24 1249   Wound Surface Area (cm^2) 1 cm^2 01/29/24 1249   Wound Volume (cm^3) 0 cm^3 01/29/24 1249   Wound Assessment Purple/maroon 01/29/24 1249   Drainage Amount None (dry) 01/29/24 1249   Odor None 01/29/24 1249   Margins Attached edges 01/29/24 1249   Number of days: 0         WOUND ASSESSMENT:   [facility] nurse consult for wounds to sacrum and back. Pt well known to writer from previous admissions. She was last seen by writer on 1/2/24 and had a small open area to her coccyx. At some point, she was admitted at [facility] and seen by wound care there. She was noted to have a DTPI to her sacral area at that time. The wound is currently evolving and has some red tissue, but is mostly covered with slough and moist eschar. There are still some areas of purple discoloration in the center of the wound. She also has a few linear areas of DTPI to her mid/right back which are clean and dry.     Response to treatment:  Well tolerated by patient.       Plan:     Plan of Care:     Back: Apply foam dressing to areas of purple discoloration to protect. Change every 3 days and as needed if loose or soiled.     Sacrum: Cleanse with soap and water, pat dry. Apply triad cream to wound, cover with foam dressing. Change daily and as needed if loose or soiled.       [x] Turn and reposition every 2 hours while in bed.    [x] Float heels off of bed with pillows under calves.    [] Heel protective boots (heel medix boots) at all times while in bed.    [] Sacral foam dressing to sacrococcygeal area. Use skin barrier film prior to placement. Peel back dressing, inspect skin beneath, and re-secure every shift. Change every 3 days and as needed if loose or soiled. 
patient did not follow-up              -Cardiology consulted for cardiac clearance              -pt deemed moderate to high risk for surgery     Underlying seizure disorder  - Continue Vimpat 100 mg BID  - EEG to r/o seizure     Thank you for this very interesting consultation.      Electronically signed by Karen Crawford DO on 1/27/2024 at 12:54 PM      Karen Crawfrod DO  [facility]  Neurology

## 2024-01-30 ENCOUNTER — HOSPITAL ENCOUNTER (INPATIENT)
Dept: VASCULAR LAB | Age: 74
Discharge: HOME | DRG: 871 | End: 2024-02-01
Attending: INTERNAL MEDICINE
Payer: MEDICARE

## 2024-01-30 ENCOUNTER — APPOINTMENT (OUTPATIENT)
Dept: VASCULAR LAB | Age: 74
DRG: 871 | End: 2024-01-30
Attending: INTERNAL MEDICINE
Payer: MEDICARE

## 2024-01-30 VITALS
TEMPERATURE: 97.8 F | HEIGHT: 60 IN | SYSTOLIC BLOOD PRESSURE: 121 MMHG | DIASTOLIC BLOOD PRESSURE: 49 MMHG | RESPIRATION RATE: 26 BRPM | HEART RATE: 123 BPM | WEIGHT: 137.79 LBS | BODY MASS INDEX: 27.05 KG/M2 | OXYGEN SATURATION: 98 %

## 2024-01-30 LAB
ABSOLUTE BANDS: 0.46 K/UL (ref 0–1)
ANION GAP SERPL CALCULATED.3IONS-SCNC: 19 MMOL/L (ref 9–17)
BANDS: 6 % (ref 0–10)
BASOPHILS # BLD: 0 K/UL (ref 0–0.2)
BASOPHILS NFR BLD: 0 % (ref 0–2)
BUN SERPL-MCNC: 56 MG/DL (ref 8–23)
CALCIUM SERPL-MCNC: 8 MG/DL (ref 8.6–10.4)
CHLORIDE SERPL-SCNC: 98 MMOL/L (ref 98–107)
CO2 SERPL-SCNC: 18 MMOL/L (ref 20–31)
CREAT SERPL-MCNC: 4.1 MG/DL (ref 0.5–0.9)
ECHO BSA: 1.65 M2
EKG ATRIAL RATE: 119 BPM
EKG Q-T INTERVAL: 362 MS
EKG QRS DURATION: 96 MS
EKG QTC CALCULATION (BAZETT): 454 MS
EKG R AXIS: 5 DEGREES
EKG T AXIS: 91 DEGREES
EKG VENTRICULAR RATE: 95 BPM
EOSINOPHIL # BLD: 0 K/UL (ref 0–0.4)
EOSINOPHILS RELATIVE PERCENT: 0 % (ref 0–4)
ERYTHROCYTE [DISTWIDTH] IN BLOOD BY AUTOMATED COUNT: 18.1 % (ref 11.5–14.9)
GFR SERPL CREATININE-BSD FRML MDRD: 11 ML/MIN/1.73M2
GLUCOSE BLD-MCNC: 169 MG/DL (ref 65–105)
GLUCOSE BLD-MCNC: 197 MG/DL (ref 65–105)
GLUCOSE SERPL-MCNC: 212 MG/DL (ref 70–99)
HCT VFR BLD AUTO: 27.2 % (ref 36–46)
HGB BLD-MCNC: 8.9 G/DL (ref 12–16)
LYMPHOCYTES NFR BLD: 0.08 K/UL (ref 1–4.8)
LYMPHOCYTES RELATIVE PERCENT: 1 % (ref 24–44)
MCH RBC QN AUTO: 29.5 PG (ref 26–34)
MCHC RBC AUTO-ENTMCNC: 32.9 G/DL (ref 31–37)
MCV RBC AUTO: 89.6 FL (ref 80–100)
METAMYELOCYTES ABSOLUTE COUNT: 0.08 K/UL
METAMYELOCYTES: 1 %
MONOCYTES NFR BLD: 0.39 K/UL (ref 0.1–1.3)
MONOCYTES NFR BLD: 5 % (ref 1–7)
MORPHOLOGY: ABNORMAL
NEUTROPHILS NFR BLD: 87 % (ref 36–66)
NEUTS SEG NFR BLD: 6.69 K/UL (ref 1.3–9.1)
PLATELET # BLD AUTO: 83 K/UL (ref 150–450)
PMV BLD AUTO: 8.4 FL (ref 6–12)
POTASSIUM SERPL-SCNC: 4.9 MMOL/L (ref 3.7–5.3)
RBC # BLD AUTO: 3.04 M/UL (ref 4–5.2)
SODIUM SERPL-SCNC: 135 MMOL/L (ref 135–144)
WBC OTHER # BLD: 7.7 K/UL (ref 3.5–11)

## 2024-01-30 PROCEDURE — 2500000003 HC RX 250 WO HCPCS: Performed by: INTERNAL MEDICINE

## 2024-01-30 PROCEDURE — 99232 SBSQ HOSP IP/OBS MODERATE 35: CPT | Performed by: PSYCHIATRY & NEUROLOGY

## 2024-01-30 PROCEDURE — 2580000003 HC RX 258: Performed by: EMERGENCY MEDICINE

## 2024-01-30 PROCEDURE — 6370000000 HC RX 637 (ALT 250 FOR IP)

## 2024-01-30 PROCEDURE — 2580000003 HC RX 258

## 2024-01-30 PROCEDURE — 85025 COMPLETE CBC W/AUTO DIFF WBC: CPT

## 2024-01-30 PROCEDURE — 36415 COLL VENOUS BLD VENIPUNCTURE: CPT

## 2024-01-30 PROCEDURE — 6360000002 HC RX W HCPCS

## 2024-01-30 PROCEDURE — 80048 BASIC METABOLIC PNL TOTAL CA: CPT

## 2024-01-30 PROCEDURE — 6360000002 HC RX W HCPCS: Performed by: INTERNAL MEDICINE

## 2024-01-30 PROCEDURE — 93970 EXTREMITY STUDY: CPT | Performed by: SURGERY

## 2024-01-30 PROCEDURE — 99239 HOSP IP/OBS DSCHRG MGMT >30: CPT | Performed by: INTERNAL MEDICINE

## 2024-01-30 PROCEDURE — 6360000002 HC RX W HCPCS: Performed by: PSYCHIATRY & NEUROLOGY

## 2024-01-30 PROCEDURE — 93970 EXTREMITY STUDY: CPT

## 2024-01-30 PROCEDURE — 87040 BLOOD CULTURE FOR BACTERIA: CPT

## 2024-01-30 PROCEDURE — C9254 INJECTION, LACOSAMIDE: HCPCS

## 2024-01-30 PROCEDURE — 94761 N-INVAS EAR/PLS OXIMETRY MLT: CPT

## 2024-01-30 PROCEDURE — P9047 ALBUMIN (HUMAN), 25%, 50ML: HCPCS | Performed by: INTERNAL MEDICINE

## 2024-01-30 PROCEDURE — 82947 ASSAY GLUCOSE BLOOD QUANT: CPT

## 2024-01-30 PROCEDURE — 94640 AIRWAY INHALATION TREATMENT: CPT

## 2024-01-30 PROCEDURE — 90935 HEMODIALYSIS ONE EVALUATION: CPT

## 2024-01-30 PROCEDURE — 93010 ELECTROCARDIOGRAM REPORT: CPT | Performed by: INTERNAL MEDICINE

## 2024-01-30 PROCEDURE — 99233 SBSQ HOSP IP/OBS HIGH 50: CPT | Performed by: NURSE PRACTITIONER

## 2024-01-30 RX ORDER — LEVOFLOXACIN 5 MG/ML
750 INJECTION, SOLUTION INTRAVENOUS ONCE
Status: DISCONTINUED | OUTPATIENT
Start: 2024-01-30 | End: 2024-01-30 | Stop reason: HOSPADM

## 2024-01-30 RX ORDER — ALBUMIN (HUMAN) 12.5 G/50ML
25 SOLUTION INTRAVENOUS
Status: COMPLETED | OUTPATIENT
Start: 2024-01-30 | End: 2024-01-30

## 2024-01-30 RX ORDER — LEVOFLOXACIN 500 MG/1
500 TABLET, FILM COATED ORAL
Qty: 10 TABLET | Refills: 0 | Status: ON HOLD | OUTPATIENT
Start: 2024-01-30 | End: 2024-02-04

## 2024-01-30 RX ORDER — LEVOFLOXACIN 5 MG/ML
750 INJECTION, SOLUTION INTRAVENOUS
Status: DISCONTINUED | OUTPATIENT
Start: 2024-01-30 | End: 2024-01-30

## 2024-01-30 RX ORDER — LEVOFLOXACIN 5 MG/ML
500 INJECTION, SOLUTION INTRAVENOUS
Status: DISCONTINUED | OUTPATIENT
Start: 2024-02-01 | End: 2024-01-30 | Stop reason: HOSPADM

## 2024-01-30 RX ADMIN — Medication 2 ML: at 13:32

## 2024-01-30 RX ADMIN — LACOSAMIDE 100 MG: 10 INJECTION INTRAVENOUS at 09:20

## 2024-01-30 RX ADMIN — BUDESONIDE AND FORMOTEROL FUMARATE DIHYDRATE 2 PUFF: 160; 4.5 AEROSOL RESPIRATORY (INHALATION) at 08:29

## 2024-01-30 RX ADMIN — ANTI-FUNGAL POWDER MICONAZOLE NITRATE TALC FREE: 1.42 POWDER TOPICAL at 14:22

## 2024-01-30 RX ADMIN — FAMOTIDINE 10 MG: 20 TABLET, FILM COATED ORAL at 09:10

## 2024-01-30 RX ADMIN — MIDODRINE HYDROCHLORIDE 5 MG: 5 TABLET ORAL at 12:12

## 2024-01-30 RX ADMIN — TIOTROPIUM BROMIDE INHALATION SPRAY 2 PUFF: 3.12 SPRAY, METERED RESPIRATORY (INHALATION) at 08:27

## 2024-01-30 RX ADMIN — SODIUM CHLORIDE, PRESERVATIVE FREE 10 ML: 5 INJECTION INTRAVENOUS at 09:16

## 2024-01-30 RX ADMIN — Medication 10 ML: at 09:56

## 2024-01-30 RX ADMIN — AMIODARONE HYDROCHLORIDE 0.5 MG/MIN: 1.8 INJECTION, SOLUTION INTRAVENOUS at 03:20

## 2024-01-30 RX ADMIN — AMIODARONE HYDROCHLORIDE 100 MG: 200 TABLET ORAL at 09:10

## 2024-01-30 RX ADMIN — FLUCONAZOLE 200 MG: 200 INJECTION, SOLUTION INTRAVENOUS at 14:27

## 2024-01-30 RX ADMIN — POLYETHYLENE GLYCOL 3350 17 G: 17 POWDER, FOR SOLUTION ORAL at 09:10

## 2024-01-30 RX ADMIN — DEXAMETHASONE SODIUM PHOSPHATE 2 MG: 4 INJECTION INTRA-ARTICULAR; INTRALESIONAL; INTRAMUSCULAR; INTRAVENOUS; SOFT TISSUE at 09:11

## 2024-01-30 RX ADMIN — AMIODARONE HYDROCHLORIDE 0.5 MG/MIN: 1.8 INJECTION, SOLUTION INTRAVENOUS at 14:36

## 2024-01-30 RX ADMIN — LEVOTHYROXINE SODIUM 25 MCG: 0.03 TABLET ORAL at 09:11

## 2024-01-30 RX ADMIN — ALBUMIN (HUMAN) 25 G: 0.25 INJECTION, SOLUTION INTRAVENOUS at 13:03

## 2024-01-30 RX ADMIN — CETIRIZINE HYDROCHLORIDE 5 MG: 10 TABLET, FILM COATED ORAL at 09:10

## 2024-01-30 RX ADMIN — ASPIRIN 81 MG: 81 TABLET, COATED ORAL at 09:10

## 2024-01-30 RX ADMIN — MUPIROCIN: 20 OINTMENT TOPICAL at 09:16

## 2024-01-30 RX ADMIN — RISPERIDONE 0.5 MG: 0.5 TABLET, ORALLY DISINTEGRATING ORAL at 09:11

## 2024-01-30 RX ADMIN — CLOPIDOGREL BISULFATE 75 MG: 75 TABLET ORAL at 09:10

## 2024-01-30 ASSESSMENT — PAIN SCALES - PAIN ASSESSMENT IN ADVANCED DEMENTIA (PAINAD)
TOTALSCORE: 3
NEGVOCALIZATION: 1
BODYLANGUAGE: 1
FACIALEXPRESSION: 0
BREATHING: 0
NEGVOCALIZATION: 1
CONSOLABILITY: 1
BODYLANGUAGE: 1
NEGVOCALIZATION: 1
CONSOLABILITY: 1
TOTALSCORE: 3
BREATHING: 0
FACIALEXPRESSION: 0
CONSOLABILITY: 1
FACIALEXPRESSION: 0
FACIALEXPRESSION: 0
BODYLANGUAGE: 1
BODYLANGUAGE: 1
NEGVOCALIZATION: 1
NEGVOCALIZATION: 1
BREATHING: 0
BODYLANGUAGE: 1
TOTALSCORE: 3
FACIALEXPRESSION: 0
NEGVOCALIZATION: 1
BREATHING: 0
CONSOLABILITY: 1
NEGVOCALIZATION: 1
BREATHING: 0
CONSOLABILITY: 1
BREATHING: 0
TOTALSCORE: 3
NEGVOCALIZATION: 1
BODYLANGUAGE: 1
CONSOLABILITY: 1
NEGVOCALIZATION: 1
CONSOLABILITY: 1
BREATHING: 0
FACIALEXPRESSION: 0
TOTALSCORE: 3
FACIALEXPRESSION: 0
CONSOLABILITY: 1
FACIALEXPRESSION: 0
TOTALSCORE: 3
FACIALEXPRESSION: 0
TOTALSCORE: 3
BODYLANGUAGE: 1
BREATHING: 0
BREATHING: 0
BODYLANGUAGE: 1
CONSOLABILITY: 1

## 2024-01-30 ASSESSMENT — PAIN SCALES - GENERAL
PAINLEVEL_OUTOF10: 0
PAINLEVEL_OUTOF10: 0
PAINLEVEL_OUTOF10: 1
PAINLEVEL_OUTOF10: 0

## 2024-01-30 NOTE — CARE COORDINATION
Face sheet, discharge orders, and WILL faxed to [facility]. Electronically signed by Debbie Mcallister RN on 1/30/2024 at 4:10 PM

## 2024-01-30 NOTE — DISCHARGE INSTR - COC
pudding thick     Routes of Feeding: Oral  Liquids: Spoon Thick Liquids (Pudding)  Daily Fluid Restriction: no  Last Modified Barium Swallow with Video (Video Swallowing Test): done on 01/29/24/     Treatments at the Time of Hospital Discharge:   Respiratory Treatments: see MAR   Oxygen Therapy:  is not on home oxygen therapy.  Ventilator:    - No ventilator support    Rehab Therapies: Physical Therapy, Occupational Therapy, and Speech/Language Therapy  Weight Bearing Status/Restrictions: No weight bearing restrictions  Other Medical Equipment (for information only, NOT a DME order):  hospital bed  Other Treatments: Skilled Nursing assessment and monitoring. Medication education and monitoring per protocol.    WOUND CARE AS ABOVE      Patient's personal belongings (please select all that are sent with patient):  None    RN SIGNATURE:  Electronically signed by Jacki Mercado RN on 1/30/24 at 3:44 PM EST    CASE MANAGEMENT/SOCIAL WORK SECTION    Inpatient Status Date: 1/26/2024    Readmission Risk Assessment Score:  Readmission Risk              Risk of Unplanned Readmission:  62           Discharging to Facility/ Agency   [address] [address]  Phone: [phone]  Fax: [phone]   Dialysis Facility (if applicable)     [facility]  [address] [place] [address]  P: [phone]  F: [phone]   [place]-Thurs-Sat @ 11:30AM    / signature: Electronically signed by Debbie Mcallister RN on 1/30/24 at 2:51 PM EST    PHYSICIAN SECTION    Prognosis: Guarded    Condition at Discharge: Stable    Rehab Potential (if transferring to Rehab): Poor    Recommended Labs or Other Treatments After Discharge: CBC and renal function while on antibiotics, Consult Infectious Disease, Dr Santizo. IV Levaquin after Hemodialysis, follow cultures.     Physician Certification: I certify the above information and transfer of Lupe Ojeda  is necessary for

## 2024-01-30 NOTE — PLAN OF CARE
Problem: Safety - Adult  Goal: Free from fall injury  Outcome: Progressing  Flowsheets (Taken 1/30/2024 1100)  Free From Fall Injury: Instruct family/caregiver on patient safety     Problem: Discharge Planning  Goal: Discharge to home or other facility with appropriate resources  Outcome: Progressing  Flowsheets (Taken 1/30/2024 0730)  Discharge to home or other facility with appropriate resources: Identify barriers to discharge with patient and caregiver     Problem: ABCDS Injury Assessment  Goal: Absence of physical injury  Outcome: Progressing     Problem: Skin/Tissue Integrity  Goal: Absence of new skin breakdown  Description: 1.  Monitor for areas of redness and/or skin breakdown  2.  Assess vascular access sites hourly  3.  Every 4-6 hours minimum:  Change oxygen saturation probe site  4.  Every 4-6 hours:  If on nasal continuous positive airway pressure, respiratory therapy assess nares and determine need for appliance change or resting period.  Outcome: Progressing     Problem: Pain  Goal: Verbalizes/displays adequate comfort level or baseline comfort level  Outcome: Progressing  Flowsheets (Taken 1/30/2024 0700)  Verbalizes/displays adequate comfort level or baseline comfort level: Encourage patient to monitor pain and request assistance     Problem: Chronic Conditions and Co-morbidities  Goal: Patient's chronic conditions and co-morbidity symptoms are monitored and maintained or improved  Outcome: Progressing  Flowsheets (Taken 1/30/2024 0730)  Care Plan - Patient's Chronic Conditions and Co-Morbidity Symptoms are Monitored and Maintained or Improved: Monitor and assess patient's chronic conditions and comorbid symptoms for stability, deterioration, or improvement

## 2024-01-30 NOTE — CARE COORDINATION
Call Report to [phone]    Fax WILL to [phone]    Elizabeth with [facility] stated patient has been accepted and can admit at 4 pm today.       Reji Vaughan notified.    Zoe Cuevas called, Gloria unavailable. Left message with Daphne. Electronically signed by Debbie Mcallister RN on 1/30/2024 at 3:03 PM

## 2024-01-30 NOTE — CARE COORDINATION
ONGOING DISCHARGE PLAN:    Patient is currently in dialysis    Spoke with patient's daughter, Sea, regarding discharge plan and discussed discharge plans.   Misty from [facility] can accept back and can hold bed for 30 days.   2.  Gloria from [facility] can accept but needs approval for dialysis slot. They need an answer since they only have 1 bed left.   3.  Informed that she meets LTACH criteria. Discussed freedom of choice. Sea agreeable to speak with Elizabeth from [facility] before deciding on LTACH..     1/29 Barium Swallow-Pureed, Pudding thick  SLP    1/26 +BC for klebsiella, UTI-candida  IV merrem/diflucan per ID  1/30 repeat BC x2    Neuro consult-EEG encephalopathy  IV vimpat  IV decadron 2mg every 8 hours    F/U with neurosurgery OP     Wound care-sacral wound    NEED PT/OT orders    Will continue to follow for additional discharge needs.    If patient is discharged prior to next notation, then this note serves as note for discharge by case management.    Electronically signed by Debbie Mcallister RN on 1/30/2024 at 12:15 PM

## 2024-01-30 NOTE — CARE COORDINATION
[facility] Encounter Date/Time: 2024 1622    Hospital Account: [id number]    MRN: [id number]    Patient: Cass Nelson    Contact Serial #: [id number]      ENCOUNTER          Patient Class: I Private Enc?  No Unit RM BD: [facility] ICU    Hospital Service: MED   Encounter DX: Metabolic encephalopathy*   ADM Provider: Claudia Stauffer MD   Procedure:     ATT Provider: Claudia Stauffer MD   REF Provider:        Admission DX: Metabolic encephalopathy, Altered mental status, Acute cystitis with hematuria, Altered mental status, unspecified altered mental status type, Multifocal pneumonia and DX codes: G93.41, R41.82, N30.01, R41.82, J18.9      PATIENT                 Name: Cass Nelson : 1950 (73 yrs)   Address: [address] [place] [address] Sex: Female   City: [place] [address]         Marital Status:    Employer: RETIRED         Mandaen: Catholic   Primary Care Provider: Kayleigh Cardoso MD         Primary Phone: [phone]   EMERGENCY CONTACT   Contact Name Legal Guardian? Relationship to Patient Home Phone Work Phone   1. Rusty,Orah  2. HARSH,CYRIL No    Child  Child [phone]            GUARANTOR            Guarantor: Cass Nelson     : 1950   Address: [address] [place] [address] Sex: [place] [address]     Relation to Patient: Self       Home Phone: [phone]   Guarantor ID: [id number]       Work Phone:     Guarantor Employer: RETIRED         Status: RETIRED      COVERAGE        PRIMARY INSURANCE   Payor: MEDICARE Plan: MEDICARE PART A AND B   Payor Address: [facility] ,  [place] [address]       Group Number:   Insurance Type: INDEMNITY   Subscriber Name: CASS NELSON Subscriber : 1950   Subscriber ID: [id number] Pat. Rel. to Sub: Self   SECONDARY INSURANCE   Payor: [facility] Plan: [facility]   Payor Address:   [address] [address]          Group Number:   Insurance Type: INDEMNITY   Subscriber Name:

## 2024-01-30 NOTE — PROGRESS NOTES
[facility] -   Infectious diseases evaluation  admission date 1/26/2024  Attending Physician Statement  I have discussed the care of the patient, including pertinent history and exam findings,  with the resident. I have reviewed the key elements of all parts of the encounter with the resident.  I agree with the assessment, plan and orders as documented by the resident.  Discontinue meropenem  IV Levaquin 750 mg every 48 hours, after dialysis on dialysis days through through February 10, 2024.  Klebsiella pneumoniae growth on blood culture sensitive to ceftriaxone and Levaquin.  She had Keflex allergy  Yogesh Santizo MD  reason for consultation:   MDRO Multifocal Pneumonia    Impression :   Current:  Multilobar pneumonia.  Klebsiella pneumonia bacteremia   UTI/Candida albicans growth on urine culture from 1/26/2024  Sepsis secomndary to above.                        Leukocytosis  Altered Mental Status  Stable Meningioma.  Stage 3 sacral decubitus.  ESRD on HD TTS  Qtc on 1/27 is 439 sec    Discussion / summary of stay / plan of care   1/27 Stopped Zyvox.  MRSA nasal probe negative.  Recommendations   Discontinue IV merrem. Start IV Levaquin after each HD session  Diflucan was started  Follow cultures and adjust antibiotic as needed.  Follow CBC renal function  Supportive care.      Infection Control Recommendations   [place] Precautions  Contact Isolation     Antimicrobial Stewardship Recommendations   Simplification of therapy  Targeted therapy    History of Present Illness:   Initial history:  Lupe Ojeda is a 73 y.o.-year-old female a history of myocardial infarction, CVA, hypertension, meningioma, CHF, type 2 diabetes, ESRD stage IV on dialysis acute on chronic respiratory failure and paroxysmal A-fib who presents with Altered Mental Status who presented to the ED from SNF after being somnolent x 2 days.  SNF nursing facility did not awaken her to take her to 
          [facility] -   Infectious diseases evaluation  admission date 1/26/2024    reason for consultation:   MDRO Multifocal Pneumonia    Impression :   Current:  Multilobar pneumonia.  Klebsiella pneumonia bacteremia pending sensitivity  UTI/Candida albicans growth on urine culture from 1/26/2024  Sepsis secomndary to above.                        Leukocytosis  Altered Mental Status  Stable Meningioma.  Stage 3 sacral decubitus.    Discussion / summary of stay / plan of care   1/27 Stopped Zyvox.  MRSA nasal probe negative.  Recommendations   IV meropenem  Diflucan was started  Follow cultures and adjust antibiotic as needed.  Follow CBC renal function  Supportive care.      Infection Control Recommendations   [place] Precautions  Contact Isolation     Antimicrobial Stewardship Recommendations   Simplification of therapy  Targeted therapy    History of Present Illness:   Initial history:  Lupe Ojeda is a 73 y.o.-year-old female a history of myocardial infarction, CVA, hypertension, meningioma, CHF, type 2 diabetes, ESRD stage IV on dialysis acute on chronic respiratory failure and paroxysmal A-fib who presents with Altered Mental Status who presented to the ED from SNF after being somnolent x 2 days.  SNF nursing facility did not awaken her to take her to dialysis because they \"did not want to disturb her\".  The patient was recently discharged from [facility]  on 1/24/24 for admission of altered mental status with concerns for underlying dementia, underlying seizure disorder, and stable meningioma.        In ER patient's, troponins were elevated but EKG was negative for acute cardiac ischemia.  There was concern for sepsis; chest x-ray was concerning for pneumonia and urinalysis was concerning for a UTI.  The patient's mental state did improve; she was complaining of abdominal pain and was given fentanyl which rendered her somnolent and unable to answer questions at 
          [facility] -   Infectious diseases evaluation  admission date 1/26/2024    reason for consultation:   MDRO Multifocal Pneumonia    Impression :   Current:  Multilobar pneumonia.  UTI  Sepsis secomndary to above.                        Leukocytosis  Altered Mental Status  Stable Meningioma.  Stage 3 sacral decubitus.    Other:    Discussion / summary of stay / plan of care   1/27 Stopped Zyvox.  MRSA nasal probe negative.  Recommendations   Zosyn 3.375 gm IV every 12 hours for now.  Follow CBC, blood, urine cx.  Adjust antibiotics.  CRP, Procalcitonin  Supportive care.  Discussed with patient, RN.    Infection Control Recommendations   [place] Precautions  Contact Isolation     Antimicrobial Stewardship Recommendations   Simplification of therapy  Targeted therapy    History of Present Illness:   Initial history:  Lupe Ojeda is a 73 y.o.-year-old female a history of myocardial infarction, CVA, hypertension, meningioma, CHF, type 2 diabetes, ESRD stage IV on dialysis acute on chronic respiratory failure and paroxysmal A-fib who presents with Altered Mental Status who presented to the ED from SNF after being somnolent x 2 days.  SNF nursing facility did not awaken her to take her to dialysis because they \"did not want to disturb her\".  The patient was recently discharged from [facility]  on 1/24/24 for admission of altered mental status with concerns for underlying dementia, underlying seizure disorder, and stable meningioma.        In ER patient's, troponins were elevated but EKG was negative for acute cardiac ischemia.  There was concern for sepsis; chest x-ray was concerning for pneumonia and urinalysis was concerning for a UTI.  The patient's mental state did improve; she was complaining of abdominal pain and was given fentanyl which rendered her somnolent and unable to answer questions at this time.  Neurology was consulted due to the patient's previous 
          Pharmacy Note     Renal Dose Adjustment    Lupe Ojeda is a 73 y.o. female. Pharmacist assessment of renally cleared medications.    Recent Labs     01/26/24  1635   BUN 88*       Recent Labs     01/26/24  1635   CREATININE 5.6*       Estimated Creatinine Clearance: 7 mL/min (A) (based on SCr of 5.6 mg/dL (HH)).  Estimated CrCl using Ideal Body Weight: 6 mL/min (based on IBW 45.5 kg)    Height:   Ht Readings from Last 1 Encounters:   01/26/24 1.524 m (5')     Weight:  Wt Readings from Last 1 Encounters:   01/26/24 64.2 kg (141 lb 8.6 oz)       The following medication dose has been adjusted based upon renal function per P&T Guidelines:             Famotidine dose was adjusted from 20 mg daily to 10 mg daily.   Brando Saunders [facility]    1/26/2024   11:20 PM  
    [facility]   IN-PATIENT SERVICE   [facility]    HISTORY AND PHYSICAL EXAMINATION            Date:   1/29/2024  Patient name:  Lupe Ojeda  Date of admission:  1/26/2024  4:22 PM  MRN:   [id number]  Account:  [id number]  YOB: 1950  PCP:    Kayleigh Cardoso MD  Room:   2010/2010-01  Code Status:    Full Code    Chief Complaint:     Chief Complaint   Patient presents with    Altered Mental Status       History Obtained From:     patient    History of Present Illness:     The patient is a 73 y.o.  Non- / non  female who presents with Altered Mental Status   and she is admitted to the hospital for the management of      Lupe Ojeda is an ill-appearing, 73-year-old lady with a history of myocardial infarction, CVA, hypertension, meningioma, CHF, type 2 diabetes, ESRD stage IV on dialysis acute on chronic respiratory failure and paroxysmal A-fib who presents with Altered Mental Status   and is admitted to the hospital for the management of Altered mental status.     According to chart review and the patient's daughter, the patient has been somnolent for 2 days.  Her skilled nursing facility did not awaken her to take her to dialysis because they \"did not want to disturb her\".  The patient was recently discharged for an admission of altered mental status with concerns for underlying dementia, underlying seizure disorder, and stable meningioma.  She became so altered and combative that she was on a Precedex drip at [facility] and transferred to [facility] for neurology follow-up.     Neurology findings included dementia with behavioral disturbances, right temporal meningioma, and seizure disorder.  Plan of care included risperidone, Decadron, and Vimpat.  Vimpat and Decadron reordered IV until the patient is alert and oriented enough to eat and drink.     In ER patient's, troponins were elevated but EKG was negative for acute cardiac ischemia.  
   01/29/24 1724   Encounter Summary   Encounter Overview/Reason  Attempted Encounter   Service Provided For: Patient not available   Referral/Consult From: Palliative Care       
  HEMODIALYSIS PRE-TREATMENT NOTE    Patient Identifiers prior to treatment: name, mrn,     Isolation Required: yes                      Isolation Type: contact, history vre       (please document if patient is being managed as a PUI/COVID-19 patient)        Hepatitis status:                           Date Drawn                             Result  Hepatitis B Surface Antigen 24     neg                     Hepatitis B Surface Antibody 24 neg        Hepatitis B Core Antibody 22 neg          How was Hepatitis Status verified: epic     Was a copy of the labs you documented provided to facility for the patient's chart: yes    Hemodialysis orders verified: yes    Access Within normal limits ( I.e. s/s of infection,...): yes     Pre-Assessment completed: yes    Pre-dialysis report received from: Dakota Raman                   Time: 7909      
  Piperacillin-Tazobactam Extended Interval Interchange    The following ordered dose of Piperacillin-Tazobactam has been changed to optimize its pharmacodynamic profile as approved by the Patient Care Review Committee.    Ordered Dose    __ 3.375 gm IV q8hrs (240 minute infusion)        New Dose    CrCl < 20ml/min     __ 3.375gm IV q12hrs  (4-hour infusion)      Pharmacists should be contacted for issues concerning drug compatibility with multiple IV medications.  All doses will be prepared using 50ml D5W to be infused over 4-hours at a rate of 12.5ml/hr.    Thank You,  Brando Saunders, RPH1/27/[id number] AM    
3.5hr of dialysis treatment.  Pt. Hypotensive during dialysis.    Meds given.   2.2L net fluid removal  tolerated.   
Bethelve sent to Dr. Crawford regarding decadron order. MD says refer to her note. Per her note, it says to continue decadron 2mg IV q12h until follow up with neurosurgery outpatient. Orders placed per MD.   
Comprehensive Nutrition Assessment    Type and Reason for Visit:  Positive Nutrition Screen    Nutrition Recommendations/Plan:   Continue current diet     Malnutrition Assessment:  Malnutrition Status:  At risk for malnutrition (Comment) (01/27/24 1404)    Context:  Acute Illness     Findings of the 6 clinical characteristics of malnutrition:  Energy Intake:  50% or less of estimated energy requirements for 5 or more days  Weight Loss:  No significant weight loss     Body Fat Loss:  No significant body fat loss     Muscle Mass Loss:  No significant muscle mass loss    Fluid Accumulation:  Mild Extremities   Strength:  Not Performed    Nutrition Assessment:    Pt admitted due to AMS. Intakes have been poor. Admission wt hx is unreliable, stated wt and bed scale were very similar in Dec 23, which this admission is relatively unchanged. Pt does receive hemodialysis    Nutrition Related Findings:    Edema: BLE trace, LUE trace, RUE +2 non-pitting. Wound Type: None       Current Nutrition Intake & Therapies:    Average Meal Intake: 0%     ADULT DIET; Dysphagia - Soft and Bite Sized; Low Sodium (2 gm); Low Potassium (Less than 3000 mg/day); Low Phosphorus (Less than 1000 mg); 60 to 80 gm    Anthropometric Measures:  Height: 152.4 cm (5')  Ideal Body Weight (IBW): 100 lbs (45 kg)    Admission Body Weight: 64.2 kg (141 lb 8.6 oz)  Current Body Weight: 64.2 kg (141 lb 8.6 oz), 141.5 % IBW. Weight Source: Not Specified  Current BMI (kg/m2): 27.6    BMI Categories: Overweight (BMI 25.0-29.9)    Estimated Daily Nutrient Needs:  Energy Requirements Based On: Kcal/kg  Weight Used for Energy Requirements: Ideal  Energy (kcal/day): 1610 based on 35kcal/kg IBW  Weight Used for Protein Requirements: Ideal  Protein (g/day): 70 based on 1.5g/kg IBW  Method Used for Fluid Requirements: 1 ml/kcal  Fluid (ml/day): 1610 based on 1ml/kcal    Nutrition Diagnosis:   Inadequate protein-energy intake related to increase demand for 
Detailed report given to Regino MAGALLON. Pt taken over to regino with 2 RNS, pct and pts daughter Sea, who was also updated on plan of care as well.   
Dr Morton updated on patient's heart rate and blood pressure. Orders received to change amiodarone rate to 0.5mg/min (see MAR).   
Dr. Denis notified of transfer order with update on patient status. MD to come see patient.  
EEG is completed.  Results to follow.  
EKG obtained shows Afib RVR with PVCs. Dr. Stauffer ordered cardiology consult. Writer sent secure message to Dr. Morton regarding new cardiology consult.   
HEMODIALYSIS POST TREATMENT NOTE    Treatment time ordered: 3.5 hours    Actual treatment time: 3.5 hours    UltraFiltration Goal: 2000 as tolerated  UltraFiltration Removed: 1800      Pre Treatment weight: 64.2kg  Post Treatment weight: 62.4kg  Estimated Dry Weight:     Access used:     Central Venous Catheter: right  chest tunnel cath     Internal Access: right upper arm fistula, not using       Access Flow: good     Sign and symptoms of infection: no       If YES: n/a    Medications or blood products given: Patient started on Amniodarone drip and received IV lopressor    Regular outpatient schedule: TTS    Summary of response to treatment: Patient tolerated treatment fairly well. Patient received IV lopressor for elevated heart rate then was started on an Amniodarone drip to control heart rate. Decreased goal for lower blood pressure.     Explain if orders NOT met, was physician notified:n/a      ACES flowsheet faxed to patient unit/ placed in patient chart: will do when finished    Post assessment completed: yes    Report given to: Dakota Raman      * Intra-treatment documented Safety Checks include the followin) Access and face visible at all times.     2) All connections and blood lines are secure with no kinks.     3) NVL alarm engaged.     4) Hemosafe device applied (if applicable).     5) No collapse of Arterial or Venous blood chambers.     6) All blood lines / pump segments in the air detectors.    
ID updated on patient's positive blood culture via Clinical Data. See new orders  
IV lopressor helped bring patient's HR down somewhat but has still been jumping . Writer contacted Dr. Stauffer to see if she wants cardio consult to switch her amiodarone to IV since she cannot take orals at this time.   
Jacob Rich DO notified of palliative care consult.   
MD came to bedside. New orders placed.  
NEUROLOGY INPATIENT PROGRESS NOTE    1/29/2024         Subjective: Lupe Ojeda is a  73 y.o. female admitted on 1/26/2024 with Metabolic encephalopathy [G93.41]  Altered mental status [R41.82]  Acute cystitis with hematuria [N30.01]  Altered mental status, unspecified altered mental status type [R41.82]  Multifocal pneumonia [J18.9]      Briefly, this is a  73 y.o. female with hx of HTN, CAD s/p CABG and blindness and ESRD on HD and A Fib not on anticoag due to bleeding risk, prior CVA, meningioma was admitted on 1/26/2024 with altered mentation likely secondary to delirium from infectious etiology including UTI and pneumonic process superimposed on baseline dementia.  She also has seizure disorder for which she has been continued on Vimpat 100 mg twice daily.  She had EEG today and it demonstrated triphasic waves compatible with underlying metabolic encephalopathy.      No current facility-administered medications on file prior to encounter.     Current Outpatient Medications on File Prior to Encounter   Medication Sig Dispense Refill    furosemide (LASIX) 80 MG tablet Take 1 tablet by mouth three times a week Indications: Mon, Wed, Fri      umeclidinium bromide (INCRUSE ELLIPTA) 62.5 MCG/ACT inhaler Inhale 1 puff into the lungs daily      b complex vitamins capsule Take 1 capsule by mouth daily      risperiDONE (RISPERDAL M-TABS) 0.5 MG disintegrating tablet Take 1 tablet by mouth 2 times daily 60 tablet 3    dexAMETHasone (DECADRON) 4 MG tablet Take 0.5 tablets by mouth every 8 (eight) hours for 7 days, THEN 0.5 tablets in the morning and at bedtime for 14 days. 25 tablet 0    lacosamide (VIMPAT) 100 MG TABS tablet Take 1 tablet by mouth 2 times daily for 60 days. Max Daily Amount: 200 mg 60 tablet 1    amiodarone (PACERONE) 100 MG tablet Take 1 tablet by mouth every morning 90 tablet 3    aspirin 81 MG EC tablet Take 1 tablet by mouth daily Always take with food, stop taking it if any black stools or rectal 
Ok to DC per . Dr. Stauffer notified and is okay with pt Dc'ing and will be placing orders.   
Patient arrives to ICU 2010. Patient hooked up to cardiac monitor and vital signs taken. Patient responding to pain. Further assessment by primary RN to follow.     Dr. Denis on unit- no plan to intubate at this time.   
Patient continuously moaning out. Writer assessed patient and patient still only responsive to painful stimuli. BP and HR elevated. Writer attempted to ask patient where she was hurting but she was not able to answer. Writer contacted Dr. Stauffer regarding the above to see if there was something we can try for pain.   1425: Per Dr. Stauffer, give PRN lopressor IV, will avoid sedatives as patient was minimally responsive this morning.   
Patients platelets are 78, Dr. Chambers was notified and asked to d/c heparin. Awaiting response.   
Per KRISTEN Duff reviewed, TLC is in proper position and may be used at this time.  
Pharmacy Medication History Note      List of current medications patient is taking is complete.     Source of information: Reji Vaughan order summary     Changes made to medication list:  Medications flagged for removal (include reason, ex. noncompliance):  None    Medications removed (include reason, ex. therapy complete or physician discontinued):  Spiriva - changed to Incruse     Medications added/doses adjusted:  Furosemide changed to 80 mg three times weekly on Mon, Wed, Fri  Incruse Ellipta 62.5 mcg/act 1 puff daily   B complex vitamin daily     Other notes (ex. Recent course of antibiotics, Coumadin dosing):  Patient started dexamethasone taper on 1/23/24.     Denies use of other OTC or herbal medications.      Allergies clarified    Medication list provided to the patient: no   Medication education provided to the patient: none    Prior to Admission Medications   Prescriptions Last Dose Informant Patient Reported? Taking?   Blood Pressure KIT   No No   Sig: Diagnosis: HTN. Needs to check blood pressure 1-2 times a day until stable, then once a day. Goal blood pressure less than 135/85, and above 110/60.   Elastic Bandages & Supports (KNEE BRACE/FLEX STAYS MEDIUM) MISC   Yes No   Sig: as directed   Handicap Placard MISC   No No   Sig: by Does not apply route Expires on 12/30/25   Lift Chair MISC   No No   Sig: by Does not apply route   Nebulizers (COMPRESSOR/NEBULIZER) MISC   No No   Sig: Nebulizer with compressor. Disposable Med Nebs 2 /month. Reusable Med Nebs 1 per 6 months.Aerosol Mask 1 per month. Replacement Filters 2 per month. All other related supplies as needed per month. Medications being used:Albuterol .083 unit dose vial. Frequency: every 6 hrs x 4/day .Length of Need: 1   Wound Dressings (ZENIFOAM GENTLE 9\"X9\"/SACRAL) PADS   No No   Sig: Apply 1 each topically daily   acetaminophen (TYLENOL) 500 MG tablet   Yes No   Sig: Take 1 tablet by mouth every 6 hours as needed for Pain or Fever 
Pt was sleeping with daughter Svitlana at bedside. Svitlana shared update and poor experience at [facility] which resulted in pt being admitted again and \"back to square one.\" Writer provided listening presence and prayer was welcomed.    01/28/24 1049   Encounter Summary   Encounter Overview/Reason  Spiritual/Emotional Needs   Service Provided For: Patient and family together   Referral/Consult From: Palliative Care   Support System Children   Last Encounter  01/28/24   Complexity of Encounter Moderate   Spiritual/Emotional needs   Type Spiritual Support;Emotional Distress   Grief, Loss, and Adjustments   Type Life Adjustments   Assessment/Intervention/Outcome   Assessment Calm;Compromised coping   Intervention Active listening;Discussed illness injury and it’s impact;Discussed belief system/Amish practices/sandra;Explored/Affirmed feelings, thoughts, concerns;Explored Coping Skills/Resources;Prayer (assurance of)/[place];Sustaining Presence/Ministry of presence   Outcome Comfort;Engaged in conversation;Expressed feelings, needs, and concerns;Expressed Gratitude;Receptive;Expressed regrets       
Pulmonary was added to pt's list for consult.   
Secure message sent to Dr. Stauffer regarding results of barium swallow evaluation, speech therapist recommendation for pureed diet, pudding thick liquids.   
Spoke to respiratory and patient is unable to do breathing treatments. Yoan reached out to Dr. Stauffer about getting palliative on as patient is a full code.   
Suzie Yoon MD notified of ID consult.   
Two of the patients daughters are at bedside and at this point want to leave patient a full code as that is what the patients wishes were. They do not want to be the ones to make the decision to change her wishes.  
UE Venous Doppler attempted @ 11:20 am, pt was in dialysis.   
[facility]   Progress Note                   Date:   1/28/24  Patient name: Lupe Ojeda  Date of admission:  1/26/2024  4:22 PM  MRN:   [id number]  YOB: 1950  PCP: Kayleigh Cardoso MD    Reason for Admission:     Subjective:       Clinical Changes / Abnormalities:   Pt appears less confused today; maintaining HR  bpm on IV amiodarone      Medications:   Scheduled Meds:   meropenem  500 mg IntraVENous Q24H    [Held by provider] metoprolol succinate  25 mg Oral QPM    dexAMETHasone  2 mg IntraVENous Q12H    sodium chloride flush  5-40 mL IntraVENous 2 times per day    sodium chloride flush  5-40 mL IntraVENous 2 times per day    [Held by provider] heparin (porcine)  5,000 Units SubCUTAneous 3 times per day    amiodarone  100 mg Oral QAM    aspirin  81 mg Oral Daily    atorvastatin  40 mg Oral QPM    clopidogrel  75 mg Oral Daily    budesonide-formoterol  2 puff Inhalation BID RT    [Held by provider] furosemide  80 mg Oral Once per day on Mon Wed Fri    [Held by provider] lacosamide  100 mg Oral BID    levothyroxine  25 mcg Oral QAM AC    lidocaine  1 patch TransDERmal Daily    melatonin  3 mg Oral QPM    mupirocin   Each Nostril Daily    miconazole   Topical BID    polyethylene glycol  17 g Oral Daily    risperiDONE  0.5 mg Oral BID    cetirizine  5 mg Oral Daily    famotidine  10 mg Oral QAM AC    tiotropium  2 puff Inhalation Daily RT    lacosamide (VIMPAT) 100 mg in sodium chloride 0.9 % 60 mL IVPB  100 mg IntraVENous BID     Continuous Infusions:   dextrose 5 % and 0.9 % NaCl 35 mL/hr at 01/28/24 1403    amiodarone 0.5 mg/min (01/28/24 1634)    sodium chloride      sodium chloride       CBC:   Recent Labs     01/26/24  1635 01/27/24  0523 01/28/24  0506   WBC 16.0* 13.0* 9.6   HGB 10.3* 8.8* 8.3*   * 78* 80*     BMP:    Recent Labs     01/26/24  1635 01/27/24  0049 01/27/24  0523 01/28/24  0506     --  140 135   K 5.6* 5.4* 5.3 4.3     --  103 98 
Writer called dialysis and they will be over to start dialysis in about 20 minutes. Family updated at bedside.   
There was concern for sepsis; chest x-ray was concerning for pneumonia and urinalysis was concerning for a UTI.  The patient's mental state did improve; she was complaining of abdominal pain and was given fentanyl which rendered her somnolent and unable to answer questions at this time.  Neurology was consulted due to the patient's previous admission at [facility]; they felt that the patient could remain at [facility].  The patient's family is very concerned about the worsening wound on the patient's sacrum and the ability for the patient's current skilled nursing facility to effectively care for the patient.      Patient seen and examined, I was called by the patient's bedside nurse, patient minimally responsive, unresponsive to pain and has been morning  Was having coarse breath sounds likely aspirating  Long discussion with patient's daughter  Patient has been having waxing waning mentation, as per patient's daughter patient was nonresponsive at the SNF, had similar presentation yesterday when she came to ER  Stat ABG done currently pending      Past Medical History:     Past Medical History:   Diagnosis Date    Acute CVA (cerebrovascular accident) ([facility]) 07/25/2020    Acute kidney injury superimposed on CKD ([facility]) 06/08/2022    Acute on chronic combined systolic (congestive) and diastolic (congestive) heart failure ([facility]) 02/06/2022    Acute respiratory failure with hypoxia and hypercapnia ([facility])     JOY (acute kidney injury) ([facility]) 01/15/2022    Arthritis     Asthma     Atrial fibrillation, unspecified type ([facility])     Bilateral lower extremity edema 04/20/2022    Bilateral pleural effusion 11/20/2023    Bradycardia 06/12/2022    Chronic diastolic congestive heart failure ([facility]) 02/20/2020    Chronic ulcer of left leg with fat layer exposed ([facility]) 06/21/2022    CKD stage 4 secondary to hypertension ([facility]) 02/20/2020    Dependence on renal dialysis ([facility])     Tues, Th, Sat    ESRD (end stage renal disease) ([facility])     
by provider] heparin (porcine)  5,000 Units SubCUTAneous 3 times per day    amiodarone  100 mg Oral QAM    aspirin  81 mg Oral Daily    atorvastatin  40 mg Oral QPM    clopidogrel  75 mg Oral Daily    budesonide-formoterol  2 puff Inhalation BID RT    [Held by provider] furosemide  80 mg Oral Once per day on Mon Wed Fri    [Held by provider] lacosamide  100 mg Oral BID    levothyroxine  25 mcg Oral QAM AC    lidocaine  1 patch TransDERmal Daily    melatonin  3 mg Oral QPM    mupirocin   Each Nostril Daily    miconazole   Topical BID    polyethylene glycol  17 g Oral Daily    risperiDONE  0.5 mg Oral BID    cetirizine  5 mg Oral Daily    famotidine  10 mg Oral QAM AC    tiotropium  2 puff Inhalation Daily RT    lacosamide (VIMPAT) 100 mg in sodium chloride 0.9 % 60 mL IVPB  100 mg IntraVENous BID     hydrALAZINE, metoprolol, anticoagulant sodium citrate, anticoagulant sodium citrate, sodium chloride flush, sodium chloride, sodium chloride flush, sodium chloride flush, sodium chloride, ondansetron **OR** ondansetron, acetaminophen **OR** acetaminophen, bisacodyl, albuterol, hydrOXYzine HCl, lidocaine, midodrine  IV Drips/Infusions   dextrose 5 % and 0.9 % NaCl 35 mL/hr at 01/30/24 0126    amiodarone 0.5 mg/min (01/30/24 0320)    sodium chloride      sodium chloride 5 mL/hr at 01/30/24 0126       Diet/Nutrition   ADULT DIET; Dysphagia - Pureed; Low Sodium (2 gm); Low Potassium (Less than 3000 mg/day); Low Phosphorus (Less than 1000 mg); 60 to 80 gm; Extremely Thick (Pudding)    Exam      Constitutional - Alert, arousable  General Appearance chronically ill-appearing  HEENT -normocephalic, atraumatic. PERRLA  Lungs - Chest expands equally, no wheezes, rales or rhonchi.  Cardiovascular - Heart sounds are normal.  normal rate and rhythm regular, no murmur, gallop or rub.  Abdomen - soft, nontender, nondistended, no masses or organomegaly  Neurologic - CN II-XII are grossly intact. There are no focal motor deficits  Skin 
   HCT 26.4 01/28/2024 05:06 AM    PLT 80 01/28/2024 05:06 AM    MCV 93.3 01/28/2024 05:06 AM    MCH 29.2 01/28/2024 05:06 AM    MCHC 31.3 01/28/2024 05:06 AM    RDW 18.2 01/28/2024 05:06 AM    NRBC 2 07/20/2022 12:30 AM    METASPCT 3 01/12/2024 05:02 AM    LYMPHOPCT 3 01/28/2024 05:06 AM    MONOPCT 2 01/28/2024 05:06 AM    BASOPCT 0 01/28/2024 05:06 AM    MONOSABS 0.19 01/28/2024 05:06 AM    LYMPHSABS 0.29 01/28/2024 05:06 AM    EOSABS 0.00 01/28/2024 05:06 AM    BASOSABS 0.00 01/28/2024 05:06 AM    DIFFTYPE NOT REPORTED 02/06/2022 10:11 AM       BMP   Lab Results   Component Value Date/Time     01/28/2024 05:06 AM    K 4.3 01/28/2024 05:06 AM    CL 98 01/28/2024 05:06 AM    CO2 23 01/28/2024 05:06 AM    BUN 37 01/28/2024 05:06 AM    CREATININE 2.9 01/28/2024 05:06 AM    GLUCOSE 102 01/28/2024 05:06 AM    MG 1.6 01/26/2024 04:35 PM    PHOS 5.8 01/26/2024 04:35 PM       LFTS  Lab Results   Component Value Date/Time    ALKPHOS 119 01/26/2024 04:35 PM    ALT 23 01/26/2024 04:35 PM    AST 13 01/26/2024 04:35 PM    PROT 5.2 01/26/2024 04:35 PM    BILITOT 0.6 01/26/2024 04:35 PM    BILIDIR 0.3 01/26/2024 04:35 PM    IBILI 0.3 01/26/2024 04:35 PM    LABALBU 3.1 01/26/2024 04:35 PM       ABG ABGs:   Lab Results   Component Value Date/Time    PHART 7.401 01/27/2024 09:00 AM    PO2ART 73.6 01/27/2024 09:00 AM    OSW6VUM 35.1 01/27/2024 09:00 AM       Lab Results   Component Value Date/Time    MODE PRVC 07/06/2022 04:40 AM         INR  Recent Labs     01/26/24  1635   PROTIME 14.5   INR 1.1       APTT  Recent Labs     01/26/24  1635   APTT 27.9       Lactic Acid  Lab Results   Component Value Date/Time    LACTA 3.2 01/27/2024 12:01 AM    LACTA 1.3 01/09/2024 12:50 PM    LACTA 2.3 01/08/2024 11:36 PM        BNP   No results for input(s): \"BNP\" in the last 72 hours.     Cultures       Radiology     CXR      CT Scans    (See actual reports for details)      SYSTEMS ASSESSMENT    Acute mental status 
LABGLOM 16 01/28/2024 05:06 AM     ABGs:  Lab Results   Component Value Date/Time    PHART 7.401 01/27/2024 09:00 AM    PO2ART 73.6 01/27/2024 09:00 AM    AZQ2DHO 35.1 01/27/2024 09:00 AM      Lab Results   Component Value Date/Time    MODE PRVC 07/06/2022 04:40 AM     Ionized Calcium:  No results found for: \"IONCA\"  Magnesium:    Lab Results   Component Value Date/Time    MG 1.6 01/26/2024 04:35 PM     Phosphorus:    Lab Results   Component Value Date/Time    PHOS 5.8 01/26/2024 04:35 PM        LIVER PROFILE   Recent Labs     01/26/24  1635   AST 13   ALT 23   LIPASE 10*   BILIDIR 0.3*   BILITOT 0.6   ALKPHOS 119*     INR   Recent Labs     01/26/24  1635   INR 1.1     PTT   Lab Results   Component Value Date    APTT 27.9 01/26/2024         RADIOLOGY     (See actual reports for details)    ASSESSMENT/PLAN     Patient Active Problem List   Diagnosis    Vitamin D deficiency    Venous insufficiency of both lower extremities    Type 2 diabetes mellitus with chronic kidney disease on chronic dialysis, without long-term current use of insulin (HCC)    Atherosclerosis of coronary artery bypass graft of native heart without angina pectoris    Hypertension, essential    Iron deficiency anemia    Colonoscopy refused    Bilateral hearing loss    COPD (chronic obstructive pulmonary disease) (HCC)    Gout with tophi    Dyslipidemia    Metabolic encephalopathy    E. coli UTI    Meningioma (HCC)    Kidney stones    Altered mental status    Delirium due to another medical condition    Diverticulosis    COVID-19 vaccination declined    Acute on chronic systolic congestive heart failure (HCC)    Chronic venous stasis dermatitis of both lower extremities    Family history of colon cancer    Family history of pancreatic cancer    Mild malnutrition (HCC)    Decreased strength, endurance, and mobility    Lymphedema    PVD (peripheral vascular disease) (HCC)    Elevated troponin    Leukocytosis    ESRD on dialysis (HCC)    Hypertensive 
into the lungs every 6 hours as needed for Wheezing or Shortness of Breath (cough) 36 g 3    levothyroxine (SYNTHROID) 25 MCG tablet Take 1 tablet by mouth every morning (before breakfast) without no other meds for 30 minutes, take when you first wake up and you are still bed 90 tablet 3    midodrine (PROAMATINE) 5 MG tablet Take 1 tablet by mouth 2 times daily as needed (if low BP below 115/65, take to dialysis) 90 tablet 3    acetaminophen (TYLENOL) 500 MG tablet Take 1 tablet by mouth every 6 hours as needed for Pain or Fever      Elastic Bandages & Supports (KNEE BRACE/FLEX STAYS MEDIUM) MISC as directed      [DISCONTINUED] amLODIPine (NORVASC) 10 MG tablet Take 1 tablet by mouth nightly 30 tablet 3    Blood Pressure KIT Diagnosis: HTN. Needs to check blood pressure 1-2 times a day until stable, then once a day. Goal blood pressure less than 135/85, and above 110/60. 1 kit 0    Nebulizers (COMPRESSOR/NEBULIZER) MISC Nebulizer with compressor. Disposable Med Nebs 2 /month. Reusable Med Nebs 1 per 6 months.Aerosol Mask 1 per month. Replacement Filters 2 per month. All other related supplies as needed per month. Medications being used:Albuterol .083 unit dose vial. Frequency: every 6 hrs x 4/day .Length of Need: 1 1 each 3    Handicap Placard MISC by Does not apply route Expires on 12/30/25 1 each 0       Allergies: Lupe Ojeda is allergic to latex, aleve [naproxen sodium], apixaban, naproxen, pioglitazone, claritin [loratadine], keflex [cephalexin], adhesive tape, keppra [levetiracetam], and lisinopril.    Past Medical History:   Diagnosis Date    Acute CVA (cerebrovascular accident) ([facility]) 07/25/2020    Acute kidney injury superimposed on CKD ([facility]) 06/08/2022    Acute on chronic combined systolic (congestive) and diastolic (congestive) heart failure ([facility]) 02/06/2022    Acute respiratory failure with hypoxia and hypercapnia ([facility])     JOY (acute kidney injury) ([facility]) 01/15/2022    Arthritis     Asthma     Atrial 
swallowing.    Pain:  10/10 (coccyx)     Reason for Referral  Lupe Ojeda was referred for a bedside swallow evaluation to assess the efficiency of her swallow function, identify signs and symptoms of aspiration and make recommendations regarding safe dietary consistencies, effective compensatory strategies, and safe eating environment.    Impression  Dysphagia Diagnosis: Suspected needs further assessment  Dysphagia Impression : ST recommends MBS to fully assess swallow function and determine least restrictive diet as Pt demonstrated consistent s/s aspiration with ALL consistencies tested.  ST to check with RN prior to MBS to determine appropriateness for test (considering Pt's fluctuating mentation/alertness).  Education provided to Pt/family.  Pt's family verbalized understanding and agreeable.  RN notified.  Awaiting MBS order.        Treatment Plan  Requires SLP Intervention: Yes     D/C Recommendations: Ongoing speech therapy is recommended during this hospitalization;To be determined       Recommended Diet and Intervention  Diet Solids Recommendation:  (pending MBS recommendation)  Liquid Consistency Recommendation:  (pending MBS recommendation)  Recommended Form of Meds: Meds in puree  Recommendations: Modified barium swallow study           Treatment/Goals  Dysphagia Goals: The patient will tolerate recommended diet without observed clinical signs of aspiration;The patient will tolerate instrumental swallowing procedure;The patient will improve oral motor function via therapeutic oral motor exercises to 5/5 each trial.    General  Chart Reviewed: Yes  Comments: AMS; pneumonia  Subjective  Subjective: Pt cooperative for evaluation with encouragement from writer and family.  Pt confused, yelling out during test.  Pt required frequent repetition d/t [place] status.  Pt's daughter and niece present.  Behavior/Cognition: Cooperative;Confused;Requires cueing  Respiratory Status: Room air  O2 Device: None (Room 
Hypertensive heart and chronic kidney disease with heart failure and with stage 5 chronic kidney disease, or end stage renal disease (HCC)     Gastroesophageal reflux disease     Moderate anxiety     Other age-related cataract     History of GI bleed     Chronic combined systolic and diastolic CHF (congestive heart failure) (HCC)     Degenerative disease of nervous system, unspecified (HCC)     Allergic rhinitis     Acquired hypothyroidism     Type 2 diabetes mellitus with polyneuropathy (HCC)     Chronic respiratory failure with hypoxia (HCC)     Other osteoporosis without current pathological fracture     Moderate vascular dementia with anxiety (HCC)     Recurrent falls while walking     Persistent atrial fibrillation (HCC)     Slow transit constipation     Right temporal lobe mass     History of type 2 diabetes mellitus     History of chronic atrial fibrillation     Encephalopathy acute     Bacteremia     Actinomyces infection     ESRD (end stage renal disease) on dialysis (HCC)     Other seizures     Sacral decubitus ulcer, stage III (HCC)     Bilateral pleural effusion     CAD S/P percutaneous coronary angioplasty     H/O heart artery stent     Abnormal echocardiogram     Mixed hyperlipidemia     Sepsis without acute organ dysfunction (HCC)     Renovascular hypertension     Goals of care, counseling/discussion     DNR (do not resuscitate) discussion     ACP (advance care planning)     Palliative care encounter     Acute cystitis with hematuria     Permanent atrial fibrillation (HCC)     Ischemic cardiomyopathy     History of end stage renal disease     Acute encephalopathy     Vasogenic cerebral edema (HCC)     Mass of temporoparietal region of brain     Encephalopathy     Delirium due to multiple etiologies     Cognitive impairment     Hallucinations     Preop cardiovascular exam     ESRD (end stage renal disease) (HCC)     Uncontrolled type 2 diabetes mellitus with hyperglycemia (HCC)     Hypocalcemia     
collection is noted.The  pituitary gland is normal in appearance. The cerebellar tonsils are in normal  position.    The ventricles, sulci, and cisterns are prominent suggestive of generalized  volume loss. The intracranial flow voids are preserved.    The globes and orbits are within normal limits. The visualized extracranial  structures including paranasal sinuses and mastoid air cells are unremarkable.    Impression  Stable study.    Unchanged 2.3 cm meningioma of the floor of the right middle cranial fossa  exerting mass effect on the right anterior temporal pole.  No signal change  in underlying brain.    There is no acute infarction, intracranial hemorrhage, or intraparenchymal  mass lesion.  Old lacunar infarction right caudate head.    Mild chronic microangiopathic ischemic disease.    Mild generalized volume loss.    No results found for this or any previous visit.    Results for orders placed during the hospital encounter of 01/26/24    CT HEAD WO CONTRAST    Narrative  EXAMINATION:  CT OF THE CERVICAL SPINE WITHOUT CONTRAST; CT OF THE CHEST, ABDOMEN, AND  PELVIS WITH CONTRAST; CT OF THE HEAD WITHOUT CONTRAST    1/26/2024 5:00 pm; 1/26/2024 5:02 pm    TECHNIQUE:  CT of the cervical spine was performed without the administration of  intravenous contrast. Multiplanar reformatted images are provided for review.  Automated exposure control, iterative reconstruction, and/or weight based  adjustment of the mA/kV was utilized to reduce the radiation dose to as low  as reasonably achievable.; CT of the chest, abdomen and pelvis was performed  with the administration of intravenous contrast. Multiplanar reformatted  images are provided for review. Automated exposure control, iterative  reconstruction, and/or weight based adjustment of the mA/kV was utilized to  reduce the radiation dose to as low as reasonably achievable.; CT of the head  was performed without the administration of intravenous contrast. 
level     Ambulatory dysfunction     Multifocal pneumonia      Treatment Plan:   Switch iv amiodarone to po 200 mg bid when able to tolerate PO  Agree with rectal ASA if unable to take PO   Patient not on anticoagulation due to due to previous hx of GI bleeding.   Consider HIT antibody panel for thrombocytopenia   IV antibiotics   ID and neurology recommendations       Romelia Mina MD [facility]                      
Transportation (Medical): No     Lack of Transportation (Non-Medical): No   Physical Activity: Inactive (2022)    Exercise Vital Sign     Days of Exercise per Week: 0 days     Minutes of Exercise per Session: 0 min   Stress: Not on file   Social Connections: Not on file   Intimate Partner Violence: Not on file   Housing Stability: Low Risk  (2024)    Housing Stability Vital Sign     Unable to Pay for Housing in the Last Year: No     Number of Places Lived in the Last Year: 1     Unstable Housing in the Last Year: No       Family History:   Family History   Problem Relation Age of Onset    Diabetes Mother     Heart Disease Mother     Heart Disease Father     Diabetes Sister     High Blood Pressure Sister     Diabetes Maternal Grandmother     Breast Cancer Maternal Cousin        Review of Systems:    Unobtainable      Objective:  CURRENT TEMPERATURE:  Temp: 97 °F (36.1 °C)  MAXIMUM TEMPERATURE OVER 24HRS:  Temp (24hrs), Av.6 °F (36.4 °C), Min:97 °F (36.1 °C), Max:97.8 °F (36.6 °C)    CURRENT RESPIRATORY RATE:  Respirations: 25  CURRENT PULSE:  Pulse: 82  CURRENT BLOOD PRESSURE:  BP: 130/66  24HR BLOOD PRESSURE RANGE:  Systolic (24hrs), Av , Min:88 , Max:167   ; Diastolic (24hrs), Av, Min:37, Max:87    24HR INTAKE/OUTPUT:    Intake/Output Summary (Last 24 hours) at 2024 1213  Last data filed at 2024 0605  Gross per 24 hour   Intake 886.47 ml   Output 0 ml   Net 886.47 ml     Patient Vitals for the past 96 hrs (Last 3 readings):   Weight   24 2112 64.2 kg (141 lb 8.6 oz)   24 1625 59.9 kg (132 lb)       Physical Exam:  GENERAL APPEARANCE: Awake, confused agitated  HEAD: normocephalic  EYES:  Not pale, anicteric, opening eyes  NOSE:  No nasal discharge.      CARDIAC: Normal S1 and S2. No S3, S4 or murmurs. Rhythm is regular.  LUNGS diminished breath sounds.  No wheezing no accessory muscle use    GI soft nontender  MUSKULOSKELETAL: Adequately aligned spine. No joint erythema 
  GLUCOSEU NEGATIVE   KETUA NEGATIVE         Radiology:  Reviewed as available.    Assessment:  Patient decreased with altered mental status decreasing responsiveness, recent hx of  meningioma  MRI brain showed right-sided anterior middle cranial fossa meningioma stable vasogenic edema and mild mass effect in the anterior right temporal lobe.    ESRD on hemodialysis secondary to diabetic nephropathy regular hemodialysis schedule Tuesday Thursday Saturday via right IJ tunneled dialysis catheter  Coronary artery disease status post CABG.  A-fib  Anemia of chronic kidney disease  Acute encephalopathy, altered mental status multifactorial recent history of meningioma, also UTI         Plan:  Continue hemodialysis Tuesday Thursday Saturday, hemodialysis Tuesday    D5 normal saline at 35 cc an hour until patient is n.p.o.    Palliative care consult    Family is considering change of CODE STATUS to DNR CCA        Thank you for the consultation.      Electronically signed by Fan Novak MD on 1/28/2024 at 4:43 PM  
\"YEAST\", \"BACTERIA\", \"CLARITYU\", \"SPECGRAV\", \"LEUKOCYTESUR\", \"UROBILINOGEN\", \"BILIRUBINUR\", \"BLOODU\", \"GLUCOSEU\", \"KETUA\", \"AMORPHOUS\" in the last 72 hours.        Radiology:  Reviewed as available.    Assessment:  Patient decreased with altered mental status decreasing responsiveness, recent hx of  meningioma  MRI brain showed right-sided anterior middle cranial fossa meningioma stable vasogenic edema and mild mass effect in the anterior right temporal lobe.    ESRD on hemodialysis secondary to diabetic nephropathy regular hemodialysis schedule Tuesday Thursday Saturday via right IJ tunneled dialysis catheter  Coronary artery disease status post CABG.  A-fib  Anemia of chronic kidney disease  Acute encephalopathy, altered mental status multifactorial recent history of meningioma, also UTI         Plan:  Continue hemodialysis Tuesday Thursday Saturday, hemodialysis today      Thank you for the consultation.      Electronically signed by Fan Novak MD on 1/30/2024 at 2:06 PM  
patient is also known to have a meningioma which can be associated with edema. 3. No evidence of acute traumatic injury to the cervical spine. 4. Multi lobar pneumonia with moderate bilateral effusions and lower lobe atelectatic changes. 5. Calcified aortic valve implicating aortic stenosis. 6. Severe disease of the celiac and SMA. 7. Bilateral renal artery disease. 8. Minimal cholelithiasis but no acute cholecystitis. 9. Right nephrolithiasis but no obstructive uropathy. 10. Mild mesenteric congestion. 11. Heavily calcified abdominal aorta as well as origins of the mesenteric and renal circulation.  Significant hemodynamic stenosis are likely.  Heavily calcified common iliac arteries with significant occlusive disease. Findings discussed with Dr. Pat Whitmore on 01/26/2024 at 5:55 p.m.     XR CHEST PORTABLE    Result Date: 1/26/2024  1. Patchy infiltrates predominantly mid and lower lungs bilateral an associated pleural effusion more readily visualized on CT chest performed less than 1 hour prior to this exam concerning for pneumonia. 2. Technically very limited study, steep LPO projection obscuring the naima correlate with chest CT report. 3. Right IJ hemodialysis catheter tip at superior cavoatrial junction level. This is confirmed on recent chest CT. 4. Cardiomegaly. 5. Sequela from CABG.     XR CHEST PORTABLE    Result Date: 1/22/2024  Cardiomegaly and postthoracotomy changes.  Stable Perm-A-Cath.  Perihilar congestion but no pulmonary edema or focal infiltrates.     CT HEAD WO CONTRAST    Result Date: 1/20/2024  No acute intracranial abnormality. Right middle cranial fossa meningioma with associated stable vasogenic edema appears unchanged.  There is some streak artifact within the right middle cranial fossa       Assessment :      Hospital Problems             Last Modified POA    * (Principal) Altered mental status 1/26/2024 Yes       Plan:     Patient status inpatient in the Progressive Unit/Step 
recently in October, on aspirin    UTI already on broad-spectrum antibiotics    Her overall prognosis is very poor with high risk of clinical decompensation, family notified, will consult palliative for goals of care discussion    Will transfer to ICU for closer monitoring  Consult pulm critical care    Cc time 35 mis         1/28  Patient seen and examined, much more awake and alert than yesterday, did follow commands, told me she is in [place] and told me her name  Seen by neurology yesterday  Patient to be on Decadron 2 mg twice daily until seen by neurosurgery has appointment next month with neurosurgery  As per patient's family patient cannot have surgery until March for meningioma due to recent cath  Patient had dialysis yesterday uremia improved which might have led to the improvement in mentation  Continues to be on Zosyn  Zyvox has been DC'd  Prolactin very high, subclinical seizures?  Thrombocytopenia chronic platelet count is 80 today  ID on board  Cultures remain negative so  Will order speech eval, patient will likely need barium swallow  On amiodarone drip, on aspirin Plavix anticoagulation as per cardiology next  DVT prophylaxis with SCDs due to thrombocytopenia    Overall prognosis is very guarded    Co continues to be nsultations:   IP CONSULT TO NEPHROLOGY  IP CONSULT TO INTERNAL MEDICINE  IP CONSULT TO NEUROLOGY  IP CONSULT TO NEPHROLOGY  IP CONSULT TO PALLIATIVE CARE  IP CONSULT TO PULMONOLOGY  IP CONSULT TO INFECTIOUS DISEASES  IP CONSULT TO CARDIOLOGY     Patient is admitted as inpatient status because of co-morbidities listed above, severity of signs and symptoms as outlined, requirement for current medical therapies and most importantly because of direct risk to patient if care not provided in a hospital setting.    Claudia Stauffer MD  1/28/2024  8:05 AM    Copy sent to Kayleigh Pittman MD    Please note that this chart was generated using voice recognition Dragon dictation software.

## 2024-01-31 PROBLEM — R79.89 ELEVATED TROPONIN: Status: RESOLVED | Noted: 2022-06-15 | Resolved: 2024-01-31

## 2024-02-01 LAB
MICROORGANISM SPEC CULT: NORMAL
SERVICE CMNT-IMP: NORMAL
SPECIMEN DESCRIPTION: NORMAL

## 2024-02-07 NOTE — DISCHARGE SUMMARY
[facility]     Department of Neurology    INPATIENT DISCHARGE SUMMARY        Patient Identification:  Lupe Ojeda is a 73 y.o. female.  :  1950  MRN: [id number]     Acct: [id number]   Admit Date:  2024  Discharge date and time: 2024  2:56 PM   Attending Provider: No att. providers found                                     Admission Diagnoses:   Encephalopathy [G93.40]  Delirium due to multiple etiologies [F05]    Discharge Diagnoses:   Principal Problem:    Encephalopathy  Active Problems:    Acute on chronic systolic congestive heart failure (HCC)    Delirium due to multiple etiologies    Cognitive impairment    Hallucinations    Preop cardiovascular exam    ESRD on hemodialysis (HCC)    Uncontrolled type 2 diabetes mellitus with hyperglycemia (HCC)    Hypocalcemia    Thrombocytopenia (HCC)    Elevated lactic acid level    Ambulatory dysfunction  Resolved Problems:    * No resolved hospital problems. *       Consults:   cardiology, ID, and nephrology    Brief Inpatient course:    Lupe Ojeda, a 73-year-old female with a complex medical history that includes a stroke, coronary artery disease post-coronary artery bypass grafting, hypertension, blindness due to bilateral cataracts, end-stage renal disease on hemodialysis, atrial fibrillation not managed with anticoagulation due to bleeding risk, chronic hearing loss, and a right temporal meningioma, was transferred from [place] for a neurosurgical evaluation. She presented with altered mental status, likely due to delirium superimposed on underlying dementia, characterized by fluctuating mental status and hallucinations. Initial MRI and CT scans revealed no acute changes but confirmed the presence of a stable meningioma in the right temporal lobe. Clinically, she was lethargic, unable to respond to questions, yet capable of obeying simple commands.    Given the concern for a possible seizure disorder, long-term

## 2024-02-17 PROBLEM — Z01.810 PREOP CARDIOVASCULAR EXAM: Status: RESOLVED | Noted: 2024-01-01 | Resolved: 2024-02-17

## 2024-05-09 NOTE — PROGRESS NOTES
[facility]up[facility] [address]  Occupational Therapy Not Seen Note    DATE: 2022    NAME: Neo Dougals  MRN: [id number]   : 1950      Patient not seen this date for Occupational Therapy due to:    Patient is not appropriate for active participation in OT evaluation/treatment at this time d/t intubated and not actively following commands per chart.     Next Scheduled Treatment:     Electronically signed by Redd Ahmadi OT on 2022 at 5:12 PM yes...

## 2024-08-14 NOTE — PROGRESS NOTES
No care due was identified.  [facility] Embedded Care Due Messages. Reference number: [id number].   8/14/2024 6:01:42 AM CDT   Sputum sent to lab

## 2024-08-19 NOTE — CARE COORDINATION
Transitional planning    Writer placed call to [facility] at [facility], sending lab results to dialysis, will follow back up. Per [facility] she has $3000.00 OOP, she has met $678.00, 25% co insurance until deductable met, will let family know. 1994 Call to daughter Aida Boyle and updated , provided [place] number if she has questions. 0820 call to [facility] at [facility], will review for placement, she states must have diagnosis ESRD to get dialysis in any facility, ps sent to MD and Dr Hazel Gray to update. 0830 late entry pd response from nephro Dr Hazel Gray states \" she is not ESRD she is JOY dialysis dependent and I have mentioned in my notes as well. \"        1400 late entry  Received faxed financial paperwork from [facility], provided to daughter, update on waiting on insurance, follow calls made this am. Detail Level: Zone

## 2024-11-03 NOTE — PROGRESS NOTES
Pt to have EGD/colonoscopy Tuesday, 3/15 with GI team. Pt eliquis placed on hold per Dr. Leodan Ricci. Dr. Zuelma Mistry notified of situation. We are treating for pericarditis as this is similar to your prior episode  Your stress test was negative which means your heart is getting adequate blood flow   There are no blood clots in your lungs  Your D-dimer level (that screens for blood clots, but doesn't always mean you have one) is elevated on our range, but when corrected for your age is NORMAL  I have prescribed pepcid with the ibuprofen to help protect against inflammation and possible ulcers from daily ibuprofen use   Please call on Monday to make follow up appointments

## 2025-05-07 NOTE — PROGRESS NOTES
Nephrology Progress Note    Subjective/   66y.o. year old female who we are seeing in consultation for Fluid overload and renal insufficiency    Interval History:  Patient's shortness of breath continues to improve with ambulation   Echocardiogram shows preserved EF of 55%. Moderate LVH. No significant pericardial effusion. Patient is started on losartan. Creatinine did increase to 1.8 today patient remains on IV Bumex 2 mg every 12 hourly. Blood pressure is improving. History of present illness: This is a 66 y.o. female with a significant past medical history of Failure with reduced ejection fraction [HFrEF with LVEF 45 to 50% in July 2020 secondary to ischemic cardiomyopathy], Coronary artery disease, Meningioma, type 2 diabetes mellitus [with nonproliferative diabetic retinopathy], essential hypertension, bilateral deafness and chronic kidney disease stage III [baseline serum creatinine 1.6 mg/dL and follows up with Dr. Hermelinda Donaldson of renal services of [facility] whom she saw 1 week ago and was switched from Lasix to Bumex], presented with complaints of dizziness, increasing shortness of breath and leg swelling. Review of serologic work-up including YRIS, ANCA, complements and protein electrophoresis were essentially normal or negative. Chest x-ray performed at presentation showed pulmonary vascular congestion. She was previously on lisinopril and Aldactone and both are currently being held.  Diuretic regimen most recently consists of Bumex 0.5 mg p.o. daily       Objective/     Vitals:    02/1950 02/08/22 0015 02/08/22 0515 02/08/22 0745   BP: (!) 126/47 (!) 141/52  (!) 151/63   Pulse: 81 80  89   Resp: 18 18  20   Temp: 96.7 °F (35.9 °C) 97.6 °F (36.4 °C)  97.7 °F (36.5 °C)   TempSrc: Oral Oral  Oral   SpO2: 98% 95%  97%   Weight:   156 lb 8.4 oz (71 kg)    Height:         24HR INTAKE/OUTPUT:      Intake/Output Summary (Last 24 hours) at 2/8/2022 1220  Last data filed at 2/8/2022 1157  Gross per 24 hour   Intake 1270 ml   Output 1425 ml   Net -155 ml     Patient Vitals for the past 96 hrs (Last 3 readings):   Weight   02/08/22 0515 156 lb 8.4 oz (71 kg)   02/06/22 2338 153 lb (69.4 kg)   02/06/22 0933 134 lb (60.8 kg)       Constitutional:  Alert, awake, no apparent distress  Cardiovascular:  S1, S2 without m/r/g  Respiratory: Diminished air entry no rales auscultated. Abdomen: +bs, soft, nt  Ext: 2+ LE edema    Data/  Recent Labs     02/06/22  1011 02/07/22  0623 02/08/22  0544   WBC 7.2 7.5 8.6   HGB 11.0* 10.3* 9.3*   HCT 34.8* 33.0* 29.1*   MCV 89.5 89.9 90.6    275 247     Recent Labs     02/06/22  1011 02/07/22  0623 02/08/22  0544    146* 141   K 4.6 4.3 4.5    109* 106   CO2 27 29 26   GLUCOSE 149* 110* 89   BUN 46* 42* 50*   CREATININE 1.47* 1.34* 1.80*   LABGLOM 35* 39* 28*   GFRAA 42* 47* 34*     Normal left ventricle size and function with an estimated EF > 55%. No segmental wall motion abnormalities seen. Moderate left ventricular hypertrophy. Normal right ventricular size and function. Left atrial dilatation. Aortic valve is trileaflet. Mild aortic stenosis. Mild-moderate aortic insufficiency. Normal aortic root dimension. Mitral annular calcification is seen. Mild mitral regurgitation. Mild tricuspid regurgitation. Normal right ventricular systolic pressure. No significant pericardial effusion is seen. Pleural effusion present. Assessment/   1. Generalized edema - This may be secondary to nephrotic syndrome or decompensated heart failure with reduced ejection fraction. Patient has been receiving IV Bumex 2 mg twice daily-patient has not achieved significant negative balance. Plan:  switch IV Bumex to oral Bumex 2 mg twice daily today  We will consider IV Lasix drip if further diuresis is required  Daily weights. Indwelling Stanford catheter to monitor strict I's and O's\     2.   Nephrotic range proteinuria [urine protein/creatinine ratio 20 g/g] - this may be secondary to diabetic nephropathy but primary glomerular disorders would need to be excluded. Patient has apparently consistently refused kidney biopsy.     3. Systemic hypertension - blood pressure control is suboptimal at this time. May be related to plasma volume expansion- place patient on metoprolol 50 mg p.o. twice daily and monitor blood pressure response -Check renin and aldosterone a.m.     4.  Bilateral leg cellulitis -      5. Chronic kidney disease stage III - most consistent with diabetic glomerulopathy given associated proteinuria and retinopathy. Patient does not have evidence of  angioedema or other complications of ACE inhibitor's.   Losartan 25 mg p.o. daily          Martha Hernandez MD yes

## (undated) DEVICE — GUIDE CATHETER: Brand: MACH1™

## (undated) DEVICE — GAUZE,SPONGE,4"X4",16PLY,XRAY,STRL,LF: Brand: MEDLINE

## (undated) DEVICE — ENDO KIT W/SYRINGE: Brand: MEDLINE INDUSTRIES, INC.

## (undated) DEVICE — ANGIOGRAPHIC CATHETER: Brand: EXPO™

## (undated) DEVICE — BITEBLOCK 54FR W/ DENT RIM BLOX

## (undated) DEVICE — INTRODUCER SHTH 6FR L11CM 0.038IN STD SIDEPRT EXTN 3 W

## (undated) DEVICE — RADIFOCUS GLIDEWIRE: Brand: GLIDEWIRE

## (undated) DEVICE — SURGICAL PROCEDURE TRAY CRD CATH SVMMC

## (undated) DEVICE — GLOVE ORANGE PI 7   MSG9070

## (undated) DEVICE — DEFENDO AIR WATER SUCTION AND BIOPSY VALVE KIT FOR  OLYMPUS: Brand: DEFENDO AIR/WATER/SUCTION AND BIOPSY VALVE

## (undated) DEVICE — FORCEPS BX L240CM WRK CHN 2.8MM STD CAP W/ NDL MIC MESH

## (undated) DEVICE — CATH BLLN ANGIO 2X12MM SC EUPHORA RX

## (undated) DEVICE — RUNTHROUGH NS EXTRA FLOPPY PTCA GUIDEWIRE: Brand: RUNTHROUGH

## (undated) DEVICE — Device: Brand: EAGLE EYE PLATINUM RX DIGITAL IVUS CATHETER

## (undated) DEVICE — KIT MICRO INTRO 4FR STIFF 40CM NIGHTENALL TUNGSTEN 7SMT

## (undated) DEVICE — CANNULA NSL AD L2IN ETCO2 SAMP SFT CRUSH RESIST FEM AIRLFE